# Patient Record
Sex: MALE | Race: WHITE | NOT HISPANIC OR LATINO | Employment: OTHER | URBAN - METROPOLITAN AREA
[De-identification: names, ages, dates, MRNs, and addresses within clinical notes are randomized per-mention and may not be internally consistent; named-entity substitution may affect disease eponyms.]

---

## 2017-03-14 ENCOUNTER — OFFICE VISIT (OUTPATIENT)
Dept: OPTOMETRY | Facility: CLINIC | Age: 77
End: 2017-03-14
Payer: COMMERCIAL

## 2017-03-14 DIAGNOSIS — E11.9 TYPE 2 DIABETES MELLITUS WITHOUT COMPLICATION, WITHOUT LONG-TERM CURRENT USE OF INSULIN (H): Primary | ICD-10-CM

## 2017-03-14 DIAGNOSIS — H52.13 MYOPIA WITH ASTIGMATISM AND PRESBYOPIA, BILATERAL: ICD-10-CM

## 2017-03-14 DIAGNOSIS — H52.203 MYOPIA WITH ASTIGMATISM AND PRESBYOPIA, BILATERAL: ICD-10-CM

## 2017-03-14 DIAGNOSIS — H52.4 MYOPIA WITH ASTIGMATISM AND PRESBYOPIA, BILATERAL: ICD-10-CM

## 2017-03-14 PROCEDURE — 92004 COMPRE OPH EXAM NEW PT 1/>: CPT | Performed by: OPTOMETRIST

## 2017-03-14 PROCEDURE — 92015 DETERMINE REFRACTIVE STATE: CPT | Performed by: OPTOMETRIST

## 2017-03-14 ASSESSMENT — VISUAL ACUITY
CORRECTION_TYPE: GLASSES
OD_CC: 20/25
OD_SC: 20/60
OS_CC+: -2
OS_CC: 20/60
OS_CC: 20/30
OD_CC: 20/125
METHOD: SNELLEN - LINEAR
OD_SC+: -1
OS_SC: 20/40
OS_SC+: +2

## 2017-03-14 ASSESSMENT — EXTERNAL EXAM - LEFT EYE: OS_EXAM: NORMAL

## 2017-03-14 ASSESSMENT — SLIT LAMP EXAM - LIDS: COMMENTS: DERMATOCHALASIS

## 2017-03-14 ASSESSMENT — CUP TO DISC RATIO
OS_RATIO: 0.45/0.4
OD_RATIO: 0.5

## 2017-03-14 ASSESSMENT — REFRACTION_WEARINGRX
OS_CYLINDER: +2.00
OD_SPHERE: -3.00
OD_AXIS: 098
OS_AXIS: 106
OS_ADD: +2.50
OD_ADD: +2.50
OD_CYLINDER: +1.50
OS_SPHERE: -3.50
SPECS_TYPE: PAL

## 2017-03-14 ASSESSMENT — EXTERNAL EXAM - RIGHT EYE: OD_EXAM: NORMAL

## 2017-03-14 ASSESSMENT — REFRACTION_MANIFEST
OD_SPHERE: -3.00
OS_CYLINDER: +2.00
OD_AXIS: 098
OS_AXIS: 107
OD_CYLINDER: +1.75
OS_SPHERE: -3.25

## 2017-03-14 ASSESSMENT — TONOMETRY
IOP_METHOD: APPLANATION
OD_IOP_MMHG: 15
OS_IOP_MMHG: 15

## 2017-03-14 ASSESSMENT — CONF VISUAL FIELD
OD_NORMAL: 1
OS_NORMAL: 1

## 2017-03-14 NOTE — MR AVS SNAPSHOT
After Visit Summary   3/14/2017    Austin Garza    MRN: [id number]           Patient Information     Date Of Birth          1940        Visit Information        Provider Department      3/14/2017 9:00 AM [facility]        Today's Diagnoses     Type 2 diabetes mellitus without complication, without long-term current use of insulin (H)    -  1    Myopia with astigmatism and presbyopia, bilateral          Care Instructions    Blood glucose should be below 120 in order to prevent ocular complications of diabetes. Each 1% decrease in your A1c significantly reduces your progression of diabetic retinopathy. Controlling diseases such as cholesterol and  blood pressure will further decrease your chance of diabetic eye disease loss.          Follow-ups after your visit        Follow-up notes from your care team     Return in about 1 year (around 3/14/2018).      Who to contact     If you have questions or need follow up information about today's clinic visit or your schedule please contact [facility] (formerly [facility]] directly at [phone].  Normal or non-critical lab and imaging results will be communicated to you by [employer], letter or phone within 4 business days after the clinic has received the results. If you do not hear from us within 7 days, please contact the clinic through [employer] or phone. If you have a critical or abnormal lab result, we will notify you by phone as soon as possible.  Submit refill requests through [employer] or call your pharmacy and they will forward the refill request to us. Please allow 3 business days for your refill to be completed.          Additional Information About Your Visit        MyChart Information     [employer] gives you secure access to your electronic health record. If you see a primary care provider, you can also send messages to your care team and make appointments. If you have questions, please call your primary care  clinic.  If you do not have a primary care provider, please call [phone] and they will assist you.        Care EveryWhere ID     This is your Care EveryWhere ID. This could be used by other organizations to access your [place] medical records  [id number]         Blood Pressure from Last 3 Encounters:   11/22/16 130/78   08/16/16 118/76   10/23/15 130/70    Weight from Last 3 Encounters:   11/22/16 98.9 kg (218 lb 1 oz)   08/16/16 97.6 kg (215 lb 1.6 oz)   10/23/15 99.2 kg (218 lb 12.8 oz)              We Performed the Following     EYE EXAM (SIMPLE-NONBILLABLE)     REFRACTION        Primary Care Provider Office Phone # Fax #    Sandi Eastman [phone]       [facility] [address] [place] [address]        Thank you!     Thank you for choosing [facility]  for your care. Our goal is always to provide you with excellent care. Hearing back from our patients is one way we can continue to improve our services. Please take a few minutes to complete the written survey that you may receive in the mail after your visit with us. Thank you!             Your Updated Medication List - Protect others around you: Learn how to safely use, store and throw away your medicines at www.disposemymeds.org.          This list is accurate as of: 3/14/17 10:08 AM.  Always use your most recent med list.                   Brand Name Dispense Instructions for use    aspirin 81 MG tablet     100    ONE DAILY       blood glucose monitoring meter device kit     1 kit    Use to test blood sugars 2-3 times daily as directed. Brand per insurance formulary       blood glucose monitoring test strip    ONE TOUCH ULTRA    1 Box    Test 2-3 times daily as directed, brand per insurance formulary.       glimepiride 1 MG tablet    AMARYL    90 tablet    Take 1 tablet (1 mg) by mouth every morning (before breakfast)       losartan-hydrochlorothiazide 50-12.5 MG per tablet    HYZAAR    90 tablet     Take 1 tablet by mouth daily       metFORMIN 1000 MG tablet    GLUCOPHAGE    180 tablet    Take 1 tablet (1,000 mg) by mouth 2 times daily (with meals)       Multi-vitamin Tabs tablet   Generic drug:  multivitamin, therapeutic with minerals      1 po qd       simvastatin 40 MG tablet    ZOCOR    90 tablet    Take 1 tablet (40 mg) by mouth At Bedtime

## 2017-03-14 NOTE — PATIENT INSTRUCTIONS
Blood glucose should be below 120 in order to prevent ocular complications of diabetes. Each 1% decrease in your A1c significantly reduces your progression of diabetic retinopathy. Controlling diseases such as cholesterol and  blood pressure will further decrease your chance of diabetic eye disease loss.

## 2017-04-11 ENCOUNTER — OFFICE VISIT (OUTPATIENT)
Dept: URGENT CARE | Facility: URGENT CARE | Age: 77
End: 2017-04-11
Payer: COMMERCIAL

## 2017-04-11 ENCOUNTER — DOCUMENTATION ONLY (OUTPATIENT)
Dept: LAB | Facility: CLINIC | Age: 77
End: 2017-04-11

## 2017-04-11 VITALS
OXYGEN SATURATION: 96 % | BODY MASS INDEX: 32.61 KG/M2 | HEIGHT: 69 IN | SYSTOLIC BLOOD PRESSURE: 140 MMHG | TEMPERATURE: 98.2 F | DIASTOLIC BLOOD PRESSURE: 80 MMHG | HEART RATE: 92 BPM | WEIGHT: 220.2 LBS

## 2017-04-11 DIAGNOSIS — J98.01 ACUTE BRONCHOSPASM: Primary | ICD-10-CM

## 2017-04-11 DIAGNOSIS — H69.91 DYSFUNCTION OF EUSTACHIAN TUBE, RIGHT: ICD-10-CM

## 2017-04-11 DIAGNOSIS — E11.9 TYPE 2 DIABETES MELLITUS WITHOUT COMPLICATION, WITHOUT LONG-TERM CURRENT USE OF INSULIN (H): Primary | ICD-10-CM

## 2017-04-11 DIAGNOSIS — E11.9 TYPE 2 DIABETES MELLITUS WITHOUT COMPLICATION, WITHOUT LONG-TERM CURRENT USE OF INSULIN (H): ICD-10-CM

## 2017-04-11 LAB — HBA1C MFR BLD: 6.7 % (ref 4.3–6)

## 2017-04-11 PROCEDURE — 99214 OFFICE O/P EST MOD 30 MIN: CPT | Mod: 25 | Performed by: PHYSICIAN ASSISTANT

## 2017-04-11 PROCEDURE — 83036 HEMOGLOBIN GLYCOSYLATED A1C: CPT | Performed by: INTERNAL MEDICINE

## 2017-04-11 PROCEDURE — 36415 COLL VENOUS BLD VENIPUNCTURE: CPT | Performed by: INTERNAL MEDICINE

## 2017-04-11 PROCEDURE — 94640 AIRWAY INHALATION TREATMENT: CPT | Performed by: PHYSICIAN ASSISTANT

## 2017-04-11 RX ORDER — ALBUTEROL SULFATE 90 UG/1
2 AEROSOL, METERED RESPIRATORY (INHALATION) EVERY 6 HOURS PRN
Qty: 1 INHALER | Refills: 0 | Status: ON HOLD | OUTPATIENT
Start: 2017-04-11 | End: 2017-12-04

## 2017-04-11 RX ORDER — FLUTICASONE PROPIONATE 50 MCG
1-2 SPRAY, SUSPENSION (ML) NASAL DAILY
Qty: 3 BOTTLE | Refills: 11 | Status: SHIPPED | OUTPATIENT
Start: 2017-04-11 | End: 2017-11-29

## 2017-04-11 RX ORDER — ALBUTEROL SULFATE 0.83 MG/ML
1 SOLUTION RESPIRATORY (INHALATION) ONCE
Qty: 3 ML | Refills: 0
Start: 2017-04-11 | End: 2017-04-11

## 2017-04-11 NOTE — NURSING NOTE
The following nebulizer treatment was given:     MEDICATION: Albuterol Sulfate 2.5 mg  : [employer]  LOT #: [id number]  EXPIRATION DATE:  Oct/2018  NDC # 9987-3018-18  //Karen Hart MA// April 11, 2017 10:24 AM

## 2017-04-11 NOTE — NURSING NOTE
"Chief Complaint   Patient presents with     Cough     x 10days  productive, URI x 7 days,  sinus drainage,  worst at night, plugged ears, hard to sleep, tired HBP, cough drops        Initial /80  Pulse 92  Temp 98.2  F (36.8  C) (Oral)  Ht 5' 8.5\" (1.74 m)  Wt 220 lb 3.2 oz (99.9 kg)  SpO2 96%  BMI 32.99 kg/m2 Estimated body mass index is 32.99 kg/(m^2) as calculated from the following:    Height as of this encounter: 5' 8.5\" (1.74 m).    Weight as of this encounter: 220 lb 3.2 oz (99.9 kg).  Medication Reconciliation: complete   Karen Hart MA// April 11, 2017 9:07 AM          "

## 2017-04-11 NOTE — PROGRESS NOTES
SUBJECTIVE:   Austin Garza is a 76 year old male presenting with a chief complaint of fever, nasal congestion, rhinorrhea, cough  and hoarse voice.  Onset of symptoms was 10 day(s) ago.  Course of illness is worsening.    Severity moderate  Current and Associated symptoms: see above  Treatment measures tried include coridin HBP.  Predisposing factors include seasonal allergies.    Has not taken BP meds this AM, will upon return home.     Patient is also concerned about chronic pressure and fullness in  ears    Past Medical History:   Diagnosis Date     Diaphragmatic hernia without mention of obstruction or gangrene      Diverticulitis of colon (without mention of hemorrhage)      Esophageal reflux      Irritable bowel syndrome      Macular degeneration (senile) of retina, unspecified      Squamous Cell Carcinoma, Back 1988     Type II or unspecified type diabetes mellitus without mention of complication, not stated as uncontrolled      Unspecified essential hypertension      Current Outpatient Prescriptions   Medication Sig Dispense Refill     metFORMIN (GLUCOPHAGE) 1000 MG tablet Take 1 tablet (1,000 mg) by mouth 2 times daily (with meals) 180 tablet 3     simvastatin (ZOCOR) 40 MG tablet Take 1 tablet (40 mg) by mouth At Bedtime 90 tablet 3     glimepiride (AMARYL) 1 MG tablet Take 1 tablet (1 mg) by mouth every morning (before breakfast) 90 tablet 3     losartan-hydrochlorothiazide (HYZAAR) 50-12.5 MG per tablet Take 1 tablet by mouth daily 90 tablet 3     blood glucose (ONE TOUCH ULTRA) test strip Test 2-3 times daily as directed, brand per insurance formulary. 1 Box 12     Blood Glucose Monitoring Suppl (ONE TOUCH ULTRA 2) W/DEVICE KIT Use to test blood sugars 2-3 times daily as directed. Brand per insurance formulary   1 kit 0     ASPIRIN 81 MG OR TABS ONE DAILY 100 3     MULTI-VITAMIN OR TABS 1 po qd       Social History   Substance Use Topics     Smoking status: Former Smoker     Packs/day: 0.50      "Years: 20.00     Quit date: 1/1/1975     Smokeless tobacco: Former User     Alcohol use 6.0 - 8.4 oz/week     10 - 14 Standard drinks or equivalent per week       ROS:  Review of systems negative except as stated above.    OBJECTIVE  :/80  Pulse 92  Temp 98.2  F (36.8  C) (Oral)  Ht 5' 8.5\" (1.74 m)  Wt 220 lb 3.2 oz (99.9 kg)  SpO2 96%  BMI 32.99 kg/m2  GENERAL APPEARANCE: healthy, alert and no distress  EYES: EOMI,  PERRL, conjunctiva clear  HENT: R ear canal normal TM swollen cloudy.  L ear canla obstructed with dry cerumen defers nurse lavage   Nose and mouth without ulcers, erythema or lesions  NECK: supple, nontender, no lymphadenopathy  RESP:expiratory wheezes throughout lung fields, cleared following in clinic Neb treatment.  no rales, rhonchi or wheezes  CV: regular rates and rhythm, normal S1 S2, no murmur noted    ASSESSMENT:  (J98.01) Acute bronchospasm  (primary encounter diagnosis)  Comment: prednisone discussed, but deferred by patient until follow up with PCP.  Pt to follow up if symptoms worsen in the meantime  Plan: INHALATION/NEBULIZER TREATMENT, INITIAL,         albuterol (2.5 MG/3ML) 0.083% neb solution,         albuterol (PROAIR HFA/PROVENTIL HFA/VENTOLIN         HFA) 108 (90 BASE) MCG/ACT Inhaler            (H69.81) Dysfunction of eustachian tube, right  Plan: fluticasone (FLONASE) 50 MCG/ACT spray  Marilu pot, fluids, humidity      "

## 2017-04-11 NOTE — MR AVS SNAPSHOT
After Visit Summary   4/11/2017    Austin Garza    MRN: [id number]           Patient Information     Date Of Birth          1940        Visit Information        Provider Department      4/11/2017 9:00 AM Antoine Finn PA-C [facility]        Today's Diagnoses     Acute bronchospasm    -  1    Dysfunction of eustachian tube, right          Care Instructions    Flonase  Marilu Pot  Albuterol inhaler        Bronchospasm (Adult)    Bronchospasm occurs when the airways (bronchial tubes) go into spasm and contract. This makes it hard to breathe and causes wheezing (a high-pitched whistling sound). Bronchospasm can also cause frequent coughing without wheezing.  Bronchospasm is due to irritation, inflammation, or allergic reaction of the airways. People with asthma get bronchospasm. However, not everyone with bronchospasm has asthma.  Being exposed to harmful fumes, a recent case of bronchitis, exercise, or a flare-up of chronic obstructive pulmonary disease (COPD) may cause the airways to spasm. An episode of bronchospasm may last 7 to 14 days. Medicine may be prescribed to relax the airways and prevent wheezing. Antibiotics will be prescribed only if your healthcare provider thinks there is a bacterial infection. Antibiotics do not help a viral infection.  Home care     Drink lots of water or other fluids (at least 10 glasses a day) during an attack. This will loosen lung secretions and make it easier to breathe. If you have heart or kidney disease, check with your doctor before you drink extra fluids.    Take prescribed medicine exactly at the times advised. If you take an inhaled medicine to help with breathing, do not use it more than once every 4 hours, unless told to do so. If prescribed an antibiotic or prednisone, take all of the medicine, even if you are feeling better after a few days.    Do not smoke. Also avoid being exposed to secondhand smoke.    If you were  given an inhaler, use it exactly as directed. If you need to use it more often than prescribed, your condition may be getting worse. Contact your healthcare provider.  Follow-up care  Follow up with your healthcare provider, or as advised.   (Note: If you are age 65 or older, have a chronic lung disease or condition that affects your immune system, or you smoke, we recommend getting pneumococcal vaccinations, as well as an influenza vaccination (flu shot) every autumn. Ask your healthcare provider about this.)  When to seek medical advice  Call your healthcare provider right away if any of these occur:    You need to use your inhalers more often than usual.    You develop a fever of 100.4 F (38 C) or higher.    You are coughing up lots of dark-colored sputum (mucus).    You do not start to improve within 24 hours.  Call 911, or get immediate medical care  Contact emergency services if any of these occur:    Coughing up bloody sputum (mucus)    Chest pain with each breath    Increased wheezing or shortness of breath    5804-3152 [employer]. [address] [place] [address]. All rights reserved. This information is not intended as a substitute for professional medical care. Always follow your healthcare professional's instructions.        Self-Care for Sinusitis  Sinusitis can often be managed with self-care. Self-care can keep sinuses moist and make you feel more comfortable. Remember to follow your doctor's instructions closely, which can make a big difference in getting your sinus problem under control.    Drink fluids  Drinking extra fluids -- a glass every hour or two -- helps thin your mucus, allowing it to drain from your sinuses more easily. A humidifier helps in much the same way. Fluids can also offset the drying effects of certain drugs.  Use saltwater rinses  Rinses help keep your sinuses and nose moist. Mix a teaspoon of salt in 8 ounces of fresh, warm water. Use a bulb syringe to  gently squirt the water into your nose a few times a day. You can also buy ready-made saline nasal sprays.  Apply hot or cold packs  Applying heat to the area surrounding your sinuses may make you feel more comfortable. Use a hot water bottle or a hand towel dipped in hot water. Some people also find ice packs effective for relieving pain.  Medications  Your doctor may prescribe medications to help treat your sinusitis. If you have an infection, antibiotics can help clear it up. If you are prescribed antibiotics, take all pills on schedule until they are gone, even if you feel better. Decongestants help relieve swelling. Use decongestant sprays for short periods only under the direction of your doctor. If you have allergies, your doctor may prescribe medications to help relieve them.     0581-4435 [employer]. [address] [place] [address]. All rights reserved. This information is not intended as a substitute for professional medical care. Always follow your healthcare professional's instructions.              Follow-ups after your visit        Your next 10 appointments already scheduled     Apr 13, 2017 10:50 AM CDT   Office Visit with Sandi Eastman MD   [facility])    [address] [place] [address]  [address]  [facility] [address] [phone]           Bring a current list of meds and any records pertaining to this visit.  For Physicals, please bring immunization records and any forms needing to be filled out.  Please arrive 10 minutes early to complete paperwork.              Who to contact     If you have questions or need follow up information about today's clinic visit or your schedule please contact [facility] directly at [phone].  Normal or non-critical lab and imaging results will be communicated to you by MyChart, letter or phone within 4 business days after the clinic has received the results. If you do not hear from  "us within 7 days, please contact the clinic through [employer] or phone. If you have a critical or abnormal lab result, we will notify you by phone as soon as possible.  Submit refill requests through [employer] or call your pharmacy and they will forward the refill request to us. Please allow 3 business days for your refill to be completed.          Additional Information About Your Visit        [employer] Information     [employer] gives you secure access to your electronic health record. If you see a primary care provider, you can also send messages to your care team and make appointments. If you have questions, please call your primary care clinic.  If you do not have a primary care provider, please call [phone] and they will assist you.        Care EveryWhere ID     This is your Care EveryWhere ID. This could be used by other organizations to access your [place] medical records  [id number]        Your Vitals Were     Pulse Temperature Height Pulse Oximetry BMI (Body Mass Index)       92 98.2  F (36.8  C) (Oral) 5' 8.5\" (1.74 m) 96% 32.99 kg/m2        Blood Pressure from Last 3 Encounters:   04/11/17 140/80   11/22/16 130/78   08/16/16 118/76    Weight from Last 3 Encounters:   04/11/17 220 lb 3.2 oz (99.9 kg)   11/22/16 218 lb 1 oz (98.9 kg)   08/16/16 215 lb 1.6 oz (97.6 kg)              We Performed the Following     INHALATION/NEBULIZER TREATMENT, INITIAL          Today's Medication Changes          These changes are accurate as of: 4/11/17  9:54 AM.  If you have any questions, ask your nurse or doctor.               Start taking these medicines.        Dose/Directions    * albuterol (2.5 MG/3ML) 0.083% neb solution   Used for:  Acute bronchospasm   Started by:  Antoine Finn PA-C        Dose:  1 vial   Take 1 vial (2.5 mg) by nebulization once for 1 dose   Quantity:  3 mL   Refills:  0       * albuterol 108 (90 BASE) MCG/ACT Inhaler   Commonly known as:  PROAIR HFA/PROVENTIL HFA/VENTOLIN HFA   Used " for:  Acute bronchospasm   Started by:  Antoine Finn PA-C        Dose:  2 puff   Inhale 2 puffs into the lungs every 6 hours as needed for shortness of breath / dyspnea or wheezing   Quantity:  1 Inhaler   Refills:  0       fluticasone 50 MCG/ACT spray   Commonly known as:  FLONASE   Used for:  Dysfunction of eustachian tube, right   Started by:  Antoine Finn PA-C        Dose:  1-2 spray   Spray 1-2 sprays into both nostrils daily   Quantity:  3 Bottle   Refills:  11       * Notice:  This list has 2 medication(s) that are the same as other medications prescribed for you. Read the directions carefully, and ask your doctor or other care provider to review them with you.         Where to get your medicines      These medications were sent to [facility]/pharmacy #[id number] - [place], [place] - [address] AT [address] [place], [place] [address]     Phone:  [phone]     albuterol 108 (90 BASE) MCG/ACT Inhaler    fluticasone 50 MCG/ACT spray         Some of these will need a paper prescription and others can be bought over the counter.  Ask your nurse if you have questions.     You don't need a prescription for these medications     albuterol (2.5 MG/3ML) 0.083% neb solution                Primary Care Provider Office Phone # Fax #    Sandi Eastman [phone]       [address] [place] [address]        Thank you!     Thank you for choosing [facility]  for your care. Our goal is always to provide you with excellent care. Hearing back from our patients is one way we can continue to improve our services. Please take a few minutes to complete the written survey that you may receive in the mail after your visit with us. Thank you!             Your Updated Medication List - Protect others around you: Learn how to safely use, store and throw away your medicines at www.disposemymeds.org.          This  list is accurate as of: 4/11/17  9:54 AM.  Always use your most recent med list.                   Brand Name Dispense Instructions for use    * albuterol (2.5 MG/3ML) 0.083% neb solution     3 mL    Take 1 vial (2.5 mg) by nebulization once for 1 dose       * albuterol 108 (90 BASE) MCG/ACT Inhaler    PROAIR HFA/PROVENTIL HFA/VENTOLIN HFA    1 Inhaler    Inhale 2 puffs into the lungs every 6 hours as needed for shortness of breath / dyspnea or wheezing       aspirin 81 MG tablet     100    ONE DAILY       blood glucose monitoring meter device kit     1 kit    Use to test blood sugars 2-3 times daily as directed. Brand per insurance formulary       blood glucose monitoring test strip    ONE TOUCH ULTRA    1 Box    Test 2-3 times daily as directed, brand per insurance formulary.       fluticasone 50 MCG/ACT spray    FLONASE    3 Bottle    Spray 1-2 sprays into both nostrils daily       glimepiride 1 MG tablet    AMARYL    90 tablet    Take 1 tablet (1 mg) by mouth every morning (before breakfast)       losartan-hydrochlorothiazide 50-12.5 MG per tablet    HYZAAR    90 tablet    Take 1 tablet by mouth daily       metFORMIN 1000 MG tablet    GLUCOPHAGE    180 tablet    Take 1 tablet (1,000 mg) by mouth 2 times daily (with meals)       Multi-vitamin Tabs tablet   Generic drug:  multivitamin, therapeutic with minerals      1 po qd       simvastatin 40 MG tablet    ZOCOR    90 tablet    Take 1 tablet (40 mg) by mouth At Bedtime       * Notice:  This list has 2 medication(s) that are the same as other medications prescribed for you. Read the directions carefully, and ask your doctor or other care provider to review them with you.

## 2017-04-11 NOTE — PATIENT INSTRUCTIONS
Flonase  Marilu Pot  Albuterol inhaler        Bronchospasm (Adult)    Bronchospasm occurs when the airways (bronchial tubes) go into spasm and contract. This makes it hard to breathe and causes wheezing (a high-pitched whistling sound). Bronchospasm can also cause frequent coughing without wheezing.  Bronchospasm is due to irritation, inflammation, or allergic reaction of the airways. People with asthma get bronchospasm. However, not everyone with bronchospasm has asthma.  Being exposed to harmful fumes, a recent case of bronchitis, exercise, or a flare-up of chronic obstructive pulmonary disease (COPD) may cause the airways to spasm. An episode of bronchospasm may last 7 to 14 days. Medicine may be prescribed to relax the airways and prevent wheezing. Antibiotics will be prescribed only if your healthcare provider thinks there is a bacterial infection. Antibiotics do not help a viral infection.  Home care     Drink lots of water or other fluids (at least 10 glasses a day) during an attack. This will loosen lung secretions and make it easier to breathe. If you have heart or kidney disease, check with your doctor before you drink extra fluids.    Take prescribed medicine exactly at the times advised. If you take an inhaled medicine to help with breathing, do not use it more than once every 4 hours, unless told to do so. If prescribed an antibiotic or prednisone, take all of the medicine, even if you are feeling better after a few days.    Do not smoke. Also avoid being exposed to secondhand smoke.    If you were given an inhaler, use it exactly as directed. If you need to use it more often than prescribed, your condition may be getting worse. Contact your healthcare provider.  Follow-up care  Follow up with your healthcare provider, or as advised.   (Note: If you are age 65 or older, have a chronic lung disease or condition that affects your immune system, or you smoke, we recommend getting pneumococcal  vaccinations, as well as an influenza vaccination (flu shot) every autumn. Ask your healthcare provider about this.)  When to seek medical advice  Call your healthcare provider right away if any of these occur:    You need to use your inhalers more often than usual.    You develop a fever of 100.4 F (38 C) or higher.    You are coughing up lots of dark-colored sputum (mucus).    You do not start to improve within 24 hours.  Call 911, or get immediate medical care  Contact emergency services if any of these occur:    Coughing up bloody sputum (mucus)    Chest pain with each breath    Increased wheezing or shortness of breath    6526-8914 [employer]. [address] [place] [address] 10833. All rights reserved. This information is not intended as a substitute for professional medical care. Always follow your healthcare professional's instructions.        Self-Care for Sinusitis  Sinusitis can often be managed with self-care. Self-care can keep sinuses moist and make you feel more comfortable. Remember to follow your doctor's instructions closely, which can make a big difference in getting your sinus problem under control.    Drink fluids  Drinking extra fluids -- a glass every hour or two -- helps thin your mucus, allowing it to drain from your sinuses more easily. A humidifier helps in much the same way. Fluids can also offset the drying effects of certain drugs.  Use saltwater rinses  Rinses help keep your sinuses and nose moist. Mix a teaspoon of salt in 8 ounces of fresh, warm water. Use a bulb syringe to gently squirt the water into your nose a few times a day. You can also buy ready-made saline nasal sprays.  Apply hot or cold packs  Applying heat to the area surrounding your sinuses may make you feel more comfortable. Use a hot water bottle or a hand towel dipped in hot water. Some people also find ice packs effective for relieving pain.  Medications  Your doctor may prescribe medications to  help treat your sinusitis. If you have an infection, antibiotics can help clear it up. If you are prescribed antibiotics, take all pills on schedule until they are gone, even if you feel better. Decongestants help relieve swelling. Use decongestant sprays for short periods only under the direction of your doctor. If you have allergies, your doctor may prescribe medications to help relieve them.     4108-2407 [employer]. [address] [place] [address], [place] [address]. All rights reserved. This information is not intended as a substitute for professional medical care. Always follow your healthcare professional's instructions.

## 2017-04-13 ENCOUNTER — OFFICE VISIT (OUTPATIENT)
Dept: PEDIATRICS | Facility: CLINIC | Age: 77
End: 2017-04-13
Payer: COMMERCIAL

## 2017-04-13 VITALS
SYSTOLIC BLOOD PRESSURE: 136 MMHG | HEIGHT: 69 IN | HEART RATE: 103 BPM | OXYGEN SATURATION: 96 % | BODY MASS INDEX: 32.35 KG/M2 | DIASTOLIC BLOOD PRESSURE: 74 MMHG | TEMPERATURE: 99 F | WEIGHT: 218.44 LBS

## 2017-04-13 DIAGNOSIS — I10 HYPERTENSION GOAL BP (BLOOD PRESSURE) < 140/90: ICD-10-CM

## 2017-04-13 DIAGNOSIS — J20.9 ACUTE BRONCHITIS WITH SYMPTOMS > 10 DAYS: Primary | ICD-10-CM

## 2017-04-13 DIAGNOSIS — E11.9 TYPE 2 DIABETES MELLITUS WITHOUT COMPLICATION, WITHOUT LONG-TERM CURRENT USE OF INSULIN (H): ICD-10-CM

## 2017-04-13 PROCEDURE — 99214 OFFICE O/P EST MOD 30 MIN: CPT | Performed by: INTERNAL MEDICINE

## 2017-04-13 RX ORDER — AZITHROMYCIN 250 MG/1
TABLET, FILM COATED ORAL
Qty: 6 TABLET | Refills: 0 | Status: SHIPPED | OUTPATIENT
Start: 2017-04-13 | End: 2017-11-16

## 2017-04-13 NOTE — PROGRESS NOTES
"  SUBJECTIVE:                                                    Austin Garza is a 76 year old male who presents to clinic today for the following health issues:        Diabetes Follow-up      Patient is checking blood sugars: not at all    Diabetic concerns: None     Symptoms of hypoglycemia (low blood sugar): none     Paresthesias (numbness or burning in feet) or sores: No     Date of last diabetic eye exam: 3/14/17     Hyperlipidemia Follow-Up      Rate your low fat/cholesterol diet?: good    Taking statin?  Yes, no muscle aches from statin    Other lipid medications/supplements?:  none     Hypertension Follow-up      Outpatient blood pressures are not being checked.    Low Salt Diet: low salt         Amount of exercise or physical activity: 2-3 days/week for an average of greater than 60 minutes    Problems taking medications regularly: No    Medication side effects: none    Diet: regular (no restrictions), low salt and low fat/cholesterol    1. Diabetes follow-up: no issues with medications, he is trying to eat a balanced, diabetic diet. No problems with low blood sugars.    2. Hypertension: No chest pain, but does have shortness of breath from recent illness. No headaches, vision changes.    3. Cough: reports that he has had \"cold symptoms\" for the past 2 weeks or so, but that it is mainly a cough that feels like he can't get any mucous up. Describes this as a tickle in his chest. Does feel short of breath. Deep inspiration or laying flat seems to trigger cough. Does feel somewhat sweaty at times, but no fevers that he knows of. No runny nose or sore throat. No leg swelling. No chest pain, but his \"abdominal muscles are sore from so much coughing\". Was recently seen in Urgent Care for his symptoms and given Flonase and albuterol, which don't seem to help much. Albuterol seems to make the coughing worse.     Problem list and histories reviewed & adjusted, as indicated.  Additional history: as " "documented    Patient Active Problem List   Diagnosis     Hearing loss     Dysfunction of eustachian tube     Hypertension goal BP (blood pressure) < 140/90     Diminished Peripheral Pulse     Type 2 diabetes mellitus without complications, goal <7%     HYPERLIPIDEMIA LDL GOAL <100     Inflammatory Monoarthritis, Left Hip     Advanced directives, counseling/discussion     Esophageal reflux     Overweight - BMI >35     BCC (basal cell carcinoma), face     Skin lesion of back     Past Surgical History:   Procedure Laterality Date     APPENDECTOMY  1966     COLONOSCOPY       SURGICAL HISTORY OF -   1985-90    Squamous cell skin cancer resection - back     SURGICAL HISTORY OF - 1998    skin tags resection     SURGICAL HISTORY OF -   2/2001    stress thall. 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 Mother      CANCER Mother      skin     Eye Disorder Mother      glaucoma     Prostate Cancer No family hx of      Cancer - colorectal No family hx of            Reviewed and updated as needed this visit by clinical staff  Tobacco  Allergies  Meds  Med Hx  Fam Hx  Soc Hx        ROS:  Constitutional, HEENT, cardiovascular, pulmonary, gi and gu systems are negative, except as otherwise noted.    OBJECTIVE:                                                    /74 (BP Location: Right arm, Patient Position: Chair, Cuff Size: Adult Large)  Pulse 103  Temp 99  F (37.2  C) (Oral)  Ht 5' 8.5\" (1.74 m)  Wt 218 lb 7 oz (99.1 kg) "  SpO2 96%  BMI 32.73 kg/m2  Body mass index is 32.73 kg/(m^2).  GENERAL: healthy, alert and no distress  EYES: Eyes grossly normal to inspection, PERRL and conjunctivae and sclerae normal  HENT: normal cephalic/atraumatic, right ear: normal: no effusions, no erythema, normal landmarks, left ear: occluded with wax, nose and mouth without ulcers or lesions, oropharynx clear and oral mucous membranes moist  NECK: no adenopathy, no asymmetry, masses, or scars and thyroid normal to palpation  RESP: coarse breath sounds throughout, good air entry, no appreciable wheezes  CV: tachycardic but regular, no appreciable murmurs.     Diagnostic Test Results:  none      ASSESSMENT/PLAN:                                                      1. Acute bronchitis with symptoms > 10 days  Given duration of symptoms and overall lack of improvement, reasonable to treat with antibiotics at this time. Does have some orthopnea, but no other symptoms or clinical signs of CHF, PE unlikely as no clear trigger and would not expect to have coarse lungs sounds throughout with this. Will start with antibiotics, if not improving by early next week or symptoms worsen, will have patient return to clinic for CXR and recheck.  - azithromycin (ZITHROMAX) 250 MG tablet; Two tablets first day, then one tablet daily for four days.  Dispense: 6 tablet; Refill: 0    2. Hypertension goal BP (blood pressure) < 140/90  Well controlled on current regimen. Does not need refills, not due for labs.    3. Type 2 diabetes mellitus without complication, without long-term current use of insulin (H)  HgbA1c rising but still within goal range. Reviewed that DM is progressive disease and may need to increase medications in the future but will monitor for now. Follow-up in 6 months.       Patient Instructions   Your diabetes looks pretty good -- hemoglobin A1c is 6.7; we want this less than 7.0.    You do have bronchitis -- given how long this has been going on, I worry  that you may have developed a bit of a bacterial infection. Let's have you start an antibiotic: Azithromycin 2 tabs today, then 1 tab daily for the next 4 days.    If your cough is not improving, come back for a chest X-ray.    Let us know when you are due for medication refills.    I'll see you in six months.       Sadni Nguyen MD  [facility]

## 2017-04-13 NOTE — MR AVS SNAPSHOT
After Visit Summary   4/13/2017    Austin Garza    MRN: [id number]           Patient Information     Date Of Birth          1940        Visit Information        Provider Department      4/13/2017 10:50 AM [facility]        Today's Diagnoses     Acute bronchitis with symptoms > 10 days    -  1      Care Instructions    Your diabetes looks pretty good -- hemoglobin A1c is 6.7; we want this less than 7.0.    You do have bronchitis -- given how long this has been going on, I worry that you may have developed a bit of a bacterial infection. Let's have you start an antibiotic: Azithromycin 2 tabs today, then 1 tab daily for the next 4 days.    If your cough is not improving, come back for a chest X-ray.    Let us know when you are due for medication refills.    I'll see you in six months.         Follow-ups after your visit        Who to contact     If you have questions or need follow up information about today's clinic visit or your schedule please contact [facility] directly at [phone].  Normal or non-critical lab and imaging results will be communicated to you by [employer], letter or phone within 4 business days after the clinic has received the results. If you do not hear from us within 7 days, please contact the clinic through [employer] or phone. If you have a critical or abnormal lab result, we will notify you by phone as soon as possible.  Submit refill requests through [employer] or call your pharmacy and they will forward the refill request to us. Please allow 3 business days for your refill to be completed.          Additional Information About Your Visit        [employer] Information     [employer] gives you secure access to your electronic health record. If you see a primary care provider, you can also send messages to your care team and make appointments. If you have questions, please call your primary care clinic.  If you do not have a primary care  "provider, please call [phone] and they will assist you.        Care EveryWhere ID     This is your Care EveryWhere ID. This could be used by other organizations to access your [facility] medical records  [id number]        Your Vitals Were     Pulse Temperature Height Pulse Oximetry BMI (Body Mass Index)       103 99  F (37.2  C) (Oral) 5' 8.5\" (1.74 m) 96% 32.73 kg/m2        Blood Pressure from Last 3 Encounters:   04/13/17 136/74   04/11/17 140/80   11/22/16 130/78    Weight from Last 3 Encounters:   04/13/17 218 lb 7 oz (99.1 kg)   04/11/17 220 lb 3.2 oz (99.9 kg)   11/22/16 218 lb 1 oz (98.9 kg)              Today, you had the following     No orders found for display         Today's Medication Changes          These changes are accurate as of: 4/13/17 11:19 AM.  If you have any questions, ask your nurse or doctor.               Start taking these medicines.        Dose/Directions    azithromycin 250 MG tablet   Commonly known as:  ZITHROMAX   Used for:  Acute bronchitis with symptoms > 10 days   Started by:  Sandi Eastman MD        Two tablets first day, then one tablet daily for four days.   Quantity:  6 tablet   Refills:  0            Where to get your medicines      These medications were sent to [facility]/pharmacy #[id number] - [place], [place] - [address] AT [address], [place] [address]     Phone:  [phone]     azithromycin 250 MG tablet                Primary Care Provider Office Phone # Fax #    Sandi Eastman [phone]       [facility] [place] [address]        Thank you!     Thank you for choosing [facility]  for your care. Our goal is always to provide you with excellent care. Hearing back from our patients is one way we can continue to improve our services. Please take a few minutes to complete the written survey that you may receive in the mail after your visit with us. Thank you!      "        Your Updated Medication List - Protect others around you: Learn how to safely use, store and throw away your medicines at www.disposemymeds.org.          This list is accurate as of: 4/13/17 11:19 AM.  Always use your most recent med list.                   Brand Name Dispense Instructions for use    * albuterol (2.5 MG/3ML) 0.083% neb solution     3 mL    Take 1 vial (2.5 mg) by nebulization once for 1 dose       * albuterol 108 (90 BASE) MCG/ACT Inhaler    PROAIR HFA/PROVENTIL HFA/VENTOLIN HFA    1 Inhaler    Inhale 2 puffs into the lungs every 6 hours as needed for shortness of breath / dyspnea or wheezing       aspirin 81 MG tablet     100    ONE DAILY       azithromycin 250 MG tablet    ZITHROMAX    6 tablet    Two tablets first day, then one tablet daily for four days.       blood glucose monitoring meter device kit     1 kit    Use to test blood sugars 2-3 times daily as directed. Brand per insurance formulary       blood glucose monitoring test strip    ONE TOUCH ULTRA    1 Box    Test 2-3 times daily as directed, brand per insurance formulary.       fluticasone 50 MCG/ACT spray    FLONASE    3 Bottle    Spray 1-2 sprays into both nostrils daily       glimepiride 1 MG tablet    AMARYL    90 tablet    Take 1 tablet (1 mg) by mouth every morning (before breakfast)       losartan-hydrochlorothiazide 50-12.5 MG per tablet    HYZAAR    90 tablet    Take 1 tablet by mouth daily       metFORMIN 1000 MG tablet    GLUCOPHAGE    180 tablet    Take 1 tablet (1,000 mg) by mouth 2 times daily (with meals)       Multi-vitamin Tabs tablet   Generic drug:  multivitamin, therapeutic with minerals      1 po qd       simvastatin 40 MG tablet    ZOCOR    90 tablet    Take 1 tablet (40 mg) by mouth At Bedtime       * Notice:  This list has 2 medication(s) that are the same as other medications prescribed for you. Read the directions carefully, and ask your doctor or other care provider to review them with you.

## 2017-04-13 NOTE — PATIENT INSTRUCTIONS
Your diabetes looks pretty good -- hemoglobin A1c is 6.7; we want this less than 7.0.    You do have bronchitis -- given how long this has been going on, I worry that you may have developed a bit of a bacterial infection. Let's have you start an antibiotic: Azithromycin 2 tabs today, then 1 tab daily for the next 4 days.    If your cough is not improving, come back for a chest X-ray.    Let us know when you are due for medication refills.    I'll see you in six months.

## 2017-04-13 NOTE — NURSING NOTE
"Chief Complaint   Patient presents with     Diabetes     Hyperlipidemia     Hypertension       Initial /74 (BP Location: Right arm, Patient Position: Chair, Cuff Size: Adult Large)  Pulse 103  Temp 99  F (37.2  C) (Oral)  Ht 5' 8.5\" (1.74 m)  Wt 218 lb 7 oz (99.1 kg)  SpO2 96%  BMI 32.73 kg/m2 Estimated body mass index is 32.73 kg/(m^2) as calculated from the following:    Height as of this encounter: 5' 8.5\" (1.74 m).    Weight as of this encounter: 218 lb 7 oz (99.1 kg).  Medication Reconciliation: complete     Meena Armendariz MA 4/13/2017 10:57 AM    "

## 2017-04-18 ENCOUNTER — TELEPHONE (OUTPATIENT)
Dept: PEDIATRICS | Facility: CLINIC | Age: 77
End: 2017-04-18

## 2017-04-18 NOTE — TELEPHONE ENCOUNTER
Patient calling back that his phone service is not working. Would like a call back on his [facility]. [phone]

## 2017-04-18 NOTE — TELEPHONE ENCOUNTER
"Patient seen last week for cough and cold and given Z-pack. States he was told to call back today if not getting better. States he still has the cough and it has not changed at all. It is preventing him from sleeping. States he gets \"attacks\" and then is fine for a while and then it \"attacks again\".  No fever. Not much sinus. [phone] ok to lm.  Mariaelena Hummel RN  "

## 2017-04-19 NOTE — TELEPHONE ENCOUNTER
Just a FYI to :    Pt calling back, notified as below. Pt states that his cough is considerably getting better as days go by, much better today. So, he would like to wait it out for now. But, if the cough gets better or with any new/worsening sx's, he agrees to make an appointment with any one of provider on Friday.    Mayte, RN  Triage Nurse

## 2017-04-19 NOTE — TELEPHONE ENCOUNTER
I would recommend that he come back in for a CXR and to have his lungs re-examined.     If he is not amenable to this, then we could try a steroid inhaler (I would recommend Flovent  2 puffs BID with a spacer and rinsing his mouth out after each use) and tessalon perles 200mg TID prn. If his symptoms do not improve with this by early next week then he would definitely need to come in to be seen at that time.    Sandi Eastman MD  Internal Medicine-Pediatrics

## 2017-05-12 ENCOUNTER — TRANSFERRED RECORDS (OUTPATIENT)
Dept: HEALTH INFORMATION MANAGEMENT | Facility: CLINIC | Age: 77
End: 2017-05-12

## 2017-09-26 ENCOUNTER — TRANSFERRED RECORDS (OUTPATIENT)
Dept: HEALTH INFORMATION MANAGEMENT | Facility: CLINIC | Age: 77
End: 2017-09-26

## 2017-10-04 DIAGNOSIS — E78.00 PURE HYPERCHOLESTEROLEMIA: ICD-10-CM

## 2017-10-04 NOTE — TELEPHONE ENCOUNTER
simvastatin (ZOCOR) 40 MG tablet     Last Written Prescription Date: 11/22/2016  Last Fill Quantity: 90, # refills: 3  Last Office Visit with G, P or [facility] prescribing provider: 4/13/2017       Lab Results   Component Value Date    CHOL 123 11/17/2016     Lab Results   Component Value Date    HDL 35 11/17/2016     Lab Results   Component Value Date    LDL 67 11/17/2016     Lab Results   Component Value Date    TRIG 105 11/17/2016     Lab Results   Component Value Date    CHOLHDLRATIO 2.5 10/23/2015

## 2017-10-05 RX ORDER — SIMVASTATIN 40 MG
TABLET ORAL
Qty: 90 TABLET | Refills: 0 | Status: SHIPPED | OUTPATIENT
Start: 2017-10-05 | End: 2017-11-16

## 2017-10-05 NOTE — TELEPHONE ENCOUNTER
Medication is being filled for 1 time refill only due to:  due for fasting labs, and diabetic check november.   Please call patient to help schedule this.   So Dumont, RN  Triage Nurse

## 2017-10-09 ENCOUNTER — TRANSFERRED RECORDS (OUTPATIENT)
Dept: HEALTH INFORMATION MANAGEMENT | Facility: CLINIC | Age: 77
End: 2017-10-09

## 2017-10-16 DIAGNOSIS — E11.9 TYPE 2 DIABETES MELLITUS WITHOUT COMPLICATION, WITHOUT LONG-TERM CURRENT USE OF INSULIN (H): Primary | ICD-10-CM

## 2017-10-17 NOTE — TELEPHONE ENCOUNTER
blood glucose (ONE TOUCH ULTRA) test strip      Last Written Prescription Date: 9/16/2017  Last Fill Quantity: 1 box,  # refills: 12   Last Office Visit with [facility], [facility] or [facility] prescribing provider: 4/13/2017  Pharmacy Comments: Pt. requests new Rx.                                         Next 5 appointments (look out 90 days)     Oct 26, 2017 11:30 AM CDT   Nurse Only with  [facility]    [address] [place] [address]  [address]  [facility] [id number] [phone]            Nov 16, 2017  7:30 AM CST   [facility]   [facility]    [address] [place] [address]  [address]  [facility] [id number] [phone]

## 2017-10-19 NOTE — TELEPHONE ENCOUNTER
Patient called back and he would like it sent to [facility] in [place] on [address]. Also he wanted to let us know that he has an appt set up with Jaren on 11/16.

## 2017-10-20 DIAGNOSIS — E11.9 TYPE 2 DIABETES MELLITUS WITHOUT COMPLICATION, WITHOUT LONG-TERM CURRENT USE OF INSULIN (H): ICD-10-CM

## 2017-10-20 NOTE — TELEPHONE ENCOUNTER
Pharmacy called requesting specific strip amount for the dispense of BG test strips.   Will resend as requested.     Gayle Lane RN -- [facility]

## 2017-10-26 ENCOUNTER — ALLIED HEALTH/NURSE VISIT (OUTPATIENT)
Dept: NURSING | Facility: CLINIC | Age: 77
End: 2017-10-26
Payer: COMMERCIAL

## 2017-10-26 DIAGNOSIS — Z23 NEED FOR PROPHYLACTIC VACCINATION AND INOCULATION AGAINST INFLUENZA: Primary | ICD-10-CM

## 2017-10-26 PROCEDURE — G0008 ADMIN INFLUENZA VIRUS VAC: HCPCS

## 2017-10-26 PROCEDURE — 90662 IIV NO PRSV INCREASED AG IM: CPT

## 2017-10-26 PROCEDURE — 99207 ZZC NO CHARGE NURSE ONLY: CPT

## 2017-10-26 NOTE — MR AVS SNAPSHOT
After Visit Summary   10/26/2017    Austin Garza    MRN: [id number]           Patient Information     Date Of Birth          1940        Visit Information        Provider Department      10/26/2017 11:30 AM EA FLU CLINIC NURSE [facility]        Today's Diagnoses     Need for prophylactic vaccination and inoculation against influenza    -  1       Follow-ups after your visit        Your next 10 appointments already scheduled     Nov 16, 2017  7:30 AM CST   Office Visit with Sandi Eastman MD   [facility] ([facility])    [address]  [address]  [facility] [id number] [phone]           Bring a current list of meds and any records pertaining to this visit. For Physicals, please bring immunization records and any forms needing to be filled out. Please arrive 10 minutes early to complete paperwork.              Who to contact     If you have questions or need follow up information about today's clinic visit or your schedule please contact [facility] directly at [phone].  Normal or non-critical lab and imaging results will be communicated to you by Webflowhart, letter or phone within 4 business days after the clinic has received the results. If you do not hear from us within 7 days, please contact the clinic through Akamedia or phone. If you have a critical or abnormal lab result, we will notify you by phone as soon as possible.  Submit refill requests through Akamedia or call your pharmacy and they will forward the refill request to us. Please allow 3 business days for your refill to be completed.          Additional Information About Your Visit        WebflowharCitrine Informatics Information     Akamedia gives you secure access to your electronic health record. If you see a primary care provider, you can also send messages to your care team and make appointments. If you have questions, please call your primary care clinic.  If you do not have a  primary care provider, please call [phone] and they will assist you.        Care EveryWhere ID     This is your Care EveryWhere ID. This could be used by other organizations to access your [place] medical records  [id number]         Blood Pressure from Last 3 Encounters:   04/13/17 136/74   04/11/17 140/80   11/22/16 130/78    Weight from Last 3 Encounters:   04/13/17 218 lb 7 oz (99.1 kg)   04/11/17 220 lb 3.2 oz (99.9 kg)   11/22/16 218 lb 1 oz (98.9 kg)              We Performed the Following     FLU VACCINE, INCREASED ANTIGEN, PRESV FREE, AGE 65+ [14927]     Vaccine Administration, Initial [95983]        Primary Care Provider Office Phone # Fax #    Sandi Eastman [phone] [address] [place] [address]        Equal Access to Services     [facility]: Hadii aad ku hadasho Soomaali, waaxda luqadaha, qaybta kaalmada adeegyada, waxay gracielain hayjennifern sylvain bennett . So [facility] [phone].    ATENCIÓN: Si habla español, tiene a edward disposición servicios gratuitos de asistencia lingüística. Quintename al [phone].    We comply with applicable federal civil rights laws and [place] laws. We do not discriminate on the basis of race, color, national origin, age, disability, sex, sexual orientation, or gender identity.            Thank you!     Thank you for choosing [facility]  for your care. Our goal is always to provide you with excellent care. Hearing back from our patients is one way we can continue to improve our services. Please take a few minutes to complete the written survey that you may receive in the mail after your visit with us. Thank you!             Your Updated Medication List - Protect others around you: Learn how to safely use, store and throw away your medicines at www.disposemymeds.org.          This list is accurate as of: 10/26/17 11:35 AM.  Always use your most recent med list.                   Brand Name Dispense Instructions for  use Diagnosis    albuterol 108 (90 BASE) MCG/ACT Inhaler    PROAIR HFA/PROVENTIL HFA/VENTOLIN HFA    1 Inhaler    Inhale 2 puffs into the lungs every 6 hours as needed for shortness of breath / dyspnea or wheezing    Acute bronchospasm       aspirin 81 MG tablet     100    ONE DAILY    Type II or unspecified type diabetes mellitus without mention of complication, not stated as uncontrolled, Unspecified essential hypertension       azithromycin 250 MG tablet    ZITHROMAX    6 tablet    Two tablets first day, then one tablet daily for four days.    Acute bronchitis with symptoms > 10 days       blood glucose monitoring meter device kit     1 kit    Use to test blood sugars 2-3 times daily as directed. Brand per insurance formulary    Type II or unspecified type diabetes mellitus without mention of complication, not stated as uncontrolled       blood glucose monitoring test strip    ONE TOUCH ULTRA    100 each    Test 2-3 times daily as directed, brand per insurance formulary.    Type 2 diabetes mellitus without complication, without long-term current use of insulin (H)       fluticasone 50 MCG/ACT spray    FLONASE    3 Bottle    Spray 1-2 sprays into both nostrils daily    Dysfunction of Eustachian tube, right       glimepiride 1 MG tablet    AMARYL    90 tablet    Take 1 tablet (1 mg) by mouth every morning (before breakfast)    Type 2 diabetes mellitus without complication, without long-term current use of insulin (H)       losartan-hydrochlorothiazide 50-12.5 MG per tablet    HYZAAR    90 tablet    Take 1 tablet by mouth daily    Hypertension goal BP (blood pressure) < 140/90       metFORMIN 1000 MG tablet    GLUCOPHAGE    180 tablet    Take 1 tablet (1,000 mg) by mouth 2 times daily (with meals)    Type 2 diabetes mellitus without complication, without long-term current use of insulin (H)       Multi-vitamin Tabs tablet   Generic drug:  multivitamin, therapeutic with minerals      1 po qd        simvastatin 40 MG  tablet    ZOCOR    90 tablet    TAKE 1 TABLET AT BEDTIME    Pure hypercholesterolemia

## 2017-11-16 ENCOUNTER — OFFICE VISIT (OUTPATIENT)
Dept: PEDIATRICS | Facility: CLINIC | Age: 77
End: 2017-11-16
Payer: COMMERCIAL

## 2017-11-16 ENCOUNTER — HOSPITAL ENCOUNTER (OUTPATIENT)
Facility: CLINIC | Age: 77
Setting detail: SPECIMEN
Discharge: HOME | End: 2017-11-16
Attending: INTERNAL MEDICINE | Admitting: INTERNAL MEDICINE
Payer: MEDICARE

## 2017-11-16 ENCOUNTER — ONCOLOGY VISIT (OUTPATIENT)
Dept: ONCOLOGY | Facility: CLINIC | Age: 77
End: 2017-11-16
Attending: INTERNAL MEDICINE
Payer: COMMERCIAL

## 2017-11-16 VITALS
HEART RATE: 92 BPM | WEIGHT: 212 LBS | SYSTOLIC BLOOD PRESSURE: 130 MMHG | DIASTOLIC BLOOD PRESSURE: 89 MMHG | TEMPERATURE: 97.2 F | RESPIRATION RATE: 16 BRPM | BODY MASS INDEX: 31.4 KG/M2 | HEIGHT: 69 IN | OXYGEN SATURATION: 97 %

## 2017-11-16 VITALS
BODY MASS INDEX: 31.89 KG/M2 | DIASTOLIC BLOOD PRESSURE: 85 MMHG | TEMPERATURE: 97.6 F | SYSTOLIC BLOOD PRESSURE: 128 MMHG | WEIGHT: 212.8 LBS | HEART RATE: 75 BPM

## 2017-11-16 DIAGNOSIS — I10 HYPERTENSION GOAL BP (BLOOD PRESSURE) < 140/90: ICD-10-CM

## 2017-11-16 DIAGNOSIS — Z13.89 SCREENING FOR DIABETIC PERIPHERAL NEUROPATHY: ICD-10-CM

## 2017-11-16 DIAGNOSIS — D72.829 LEUKOCYTOSIS, UNSPECIFIED TYPE: Primary | ICD-10-CM

## 2017-11-16 DIAGNOSIS — E78.5 HYPERLIPIDEMIA LDL GOAL <100: ICD-10-CM

## 2017-11-16 DIAGNOSIS — E11.9 TYPE 2 DIABETES MELLITUS WITHOUT COMPLICATION, WITHOUT LONG-TERM CURRENT USE OF INSULIN (H): Primary | ICD-10-CM

## 2017-11-16 DIAGNOSIS — D72.829 LEUKOCYTOSIS, UNSPECIFIED TYPE: ICD-10-CM

## 2017-11-16 DIAGNOSIS — R53.83 FATIGUE, UNSPECIFIED TYPE: ICD-10-CM

## 2017-11-16 LAB
ALBUMIN SERPL-MCNC: 3.9 G/DL (ref 3.4–5)
ALP SERPL-CCNC: 55 U/L (ref 40–150)
ALT SERPL W P-5'-P-CCNC: 25 U/L (ref 0–70)
ANION GAP SERPL CALCULATED.3IONS-SCNC: 9 MMOL/L (ref 3–14)
AST SERPL W P-5'-P-CCNC: 15 U/L (ref 0–45)
BILIRUB SERPL-MCNC: 0.6 MG/DL (ref 0.2–1.3)
BUN SERPL-MCNC: 29 MG/DL (ref 7–30)
CALCIUM SERPL-MCNC: 9 MG/DL (ref 8.5–10.1)
CHLORIDE SERPL-SCNC: 107 MMOL/L (ref 94–109)
CHOLEST SERPL-MCNC: 109 MG/DL
CO2 SERPL-SCNC: 25 MMOL/L (ref 20–32)
COPATH REPORT: NORMAL
CREAT SERPL-MCNC: 1.19 MG/DL (ref 0.66–1.25)
CREAT UR-MCNC: 234 MG/DL
GFR SERPL CREATININE-BSD FRML MDRD: 60 ML/MIN/1.7M2
GLUCOSE SERPL-MCNC: 139 MG/DL (ref 70–99)
HBA1C MFR BLD: 6.3 % (ref 4.3–6)
HDLC SERPL-MCNC: 33 MG/DL
LDH SERPL L TO P-CCNC: 188 U/L (ref 85–227)
LDLC SERPL CALC-MCNC: 54 MG/DL
MICROALBUMIN UR-MCNC: 22 MG/L
MICROALBUMIN/CREAT UR: 9.19 MG/G CR (ref 0–17)
NONHDLC SERPL-MCNC: 76 MG/DL
POTASSIUM SERPL-SCNC: 4.1 MMOL/L (ref 3.4–5.3)
PROT SERPL-MCNC: 7.3 G/DL (ref 6.8–8.8)
RETICS # AUTO: 52.6 10E9/L (ref 25–95)
RETICS/RBC NFR AUTO: 1.7 % (ref 0.5–2)
SODIUM SERPL-SCNC: 141 MMOL/L (ref 133–144)
TRIGL SERPL-MCNC: 111 MG/DL
TSH SERPL DL<=0.005 MIU/L-ACNC: 2.63 MU/L (ref 0.4–4)
URATE SERPL-MCNC: 7.4 MG/DL (ref 3.5–7.2)

## 2017-11-16 PROCEDURE — 85045 AUTOMATED RETICULOCYTE COUNT: CPT | Performed by: INTERNAL MEDICINE

## 2017-11-16 PROCEDURE — 83036 HEMOGLOBIN GLYCOSYLATED A1C: CPT | Performed by: INTERNAL MEDICINE

## 2017-11-16 PROCEDURE — 84550 ASSAY OF BLOOD/URIC ACID: CPT | Performed by: INTERNAL MEDICINE

## 2017-11-16 PROCEDURE — 88185 FLOWCYTOMETRY/TC ADD-ON: CPT | Performed by: INTERNAL MEDICINE

## 2017-11-16 PROCEDURE — 82043 UR ALBUMIN QUANTITATIVE: CPT | Performed by: INTERNAL MEDICINE

## 2017-11-16 PROCEDURE — 85025 COMPLETE CBC W/AUTO DIFF WBC: CPT | Performed by: INTERNAL MEDICINE

## 2017-11-16 PROCEDURE — 99205 OFFICE O/P NEW HI 60 MIN: CPT | Performed by: INTERNAL MEDICINE

## 2017-11-16 PROCEDURE — 85060 BLOOD SMEAR INTERPRETATION: CPT | Performed by: INTERNAL MEDICINE

## 2017-11-16 PROCEDURE — 80061 LIPID PANEL: CPT | Performed by: INTERNAL MEDICINE

## 2017-11-16 PROCEDURE — 99211 OFF/OP EST MAY X REQ PHY/QHP: CPT

## 2017-11-16 PROCEDURE — 88184 FLOWCYTOMETRY/ TC 1 MARKER: CPT | Performed by: INTERNAL MEDICINE

## 2017-11-16 PROCEDURE — 36415 COLL VENOUS BLD VENIPUNCTURE: CPT

## 2017-11-16 PROCEDURE — 40001005 ZZHCL STATISTIC FLOW >15 ABY TC 88189: Performed by: INTERNAL MEDICINE

## 2017-11-16 PROCEDURE — 83615 LACTATE (LD) (LDH) ENZYME: CPT | Performed by: INTERNAL MEDICINE

## 2017-11-16 PROCEDURE — 99207 C FOOT EXAM  NO CHARGE: CPT | Mod: 25 | Performed by: INTERNAL MEDICINE

## 2017-11-16 PROCEDURE — 99214 OFFICE O/P EST MOD 30 MIN: CPT | Mod: 25 | Performed by: INTERNAL MEDICINE

## 2017-11-16 RX ORDER — ATORVASTATIN CALCIUM 20 MG/1
20 TABLET, FILM COATED ORAL DAILY
Qty: 90 TABLET | Refills: 3 | Status: ON HOLD | OUTPATIENT
Start: 2017-11-16 | End: 2017-12-04

## 2017-11-16 RX ORDER — GLIMEPIRIDE 1 MG/1
1 TABLET ORAL
Qty: 90 TABLET | Refills: 3 | Status: SHIPPED | OUTPATIENT
Start: 2017-11-16 | End: 2018-10-10

## 2017-11-16 RX ORDER — LOSARTAN POTASSIUM AND HYDROCHLOROTHIAZIDE 12.5; 5 MG/1; MG/1
1 TABLET ORAL DAILY
Qty: 90 TABLET | Refills: 3 | Status: SHIPPED | OUTPATIENT
Start: 2017-11-16 | End: 2018-10-10

## 2017-11-16 ASSESSMENT — PAIN SCALES - GENERAL: PAINLEVEL: NO PAIN (0)

## 2017-11-16 NOTE — NURSING NOTE
"Oncology Rooming Note    November 16, 2017 2:52 PM   Austin Garza is a 77 year old male who presents for:    Chief Complaint   Patient presents with     Oncology Clinic Visit     New Patient     Initial Vitals: /89  Pulse 92  Temp 97.2  F (36.2  C) (Tympanic)  Resp 16  Ht 1.74 m (5' 8.5\")  Wt 96.2 kg (212 lb)  SpO2 97%  BMI 31.77 kg/m2 Estimated body mass index is 31.77 kg/(m^2) as calculated from the following:    Height as of this encounter: 1.74 m (5' 8.5\").    Weight as of this encounter: 96.2 kg (212 lb). Body surface area is 2.16 meters squared.  No Pain (0) Comment: Data Unavailable   No LMP for male patient.  Allergies reviewed: Yes  Medications reviewed: Yes    Medications: Medication refills not needed today.  Pharmacy name entered into SimuForm:    [id number] - [place], [place] - [address] AT [facility] [address]    Clinical concerns: New Patient- PCP wanted pt to follow up for abnormal lab work    8 minutes for nursing intake (face to face time)     Cherry Rodriguez CMA     DISCHARGE PLAN:  Next appointments: See patient instruction section  Departure Mode: Ambulatory  Accompanied by: wife  0 minutes for nursing discharge (face to face time)   Cherry Rodriguez CMA                  "

## 2017-11-16 NOTE — MR AVS SNAPSHOT
After Visit Summary   11/16/2017    Austin Garza    MRN: [id number]           Patient Information     Date Of Birth          1940        Visit Information        Provider Department      11/16/2017 7:30 AM Sandi Eastman MD [facility]        Today's Diagnoses     Screening for diabetic peripheral neuropathy    -  1    Hypertension goal BP (blood pressure) < 140/90        Type 2 diabetes mellitus without complication, without long-term current use of insulin (H)        Hyperlipidemia LDL goal <100        Fatigue, unspecified type          Care Instructions    All of your medications were refilled. I would like for you to switch from simvastatin to atorvastatin (this is a stronger, safer medication). Let me know if this is goo expensive and we can switch you back to simvastatin.    We will check labs today. I will call with these results. We will also check for some causes of fatigue.     Come back in 6 months.    Let me know if you get worsening shortness of breath with activity.           Follow-ups after your visit        Who to contact     If you have questions or need follow up information about today's clinic visit or your schedule please contact [facility] directly at [phone].  Normal or non-critical lab and imaging results will be communicated to you by Palmhart, letter or phone within 4 business days after the clinic has received the results. If you do not hear from us within 7 days, please contact the clinic through Advitecht or phone. If you have a critical or abnormal lab result, we will notify you by phone as soon as possible.  Submit refill requests through Nextly or call your pharmacy and they will forward the refill request to us. Please allow 3 business days for your refill to be completed.          Additional Information About Your Visit        Palmhart Information     Nextly gives you secure access to your electronic health record. If you see a  primary care provider, you can also send messages to your care team and make appointments. If you have questions, please call your primary care clinic.  If you do not have a primary care provider, please call [phone] and they will assist you.        Care EveryWhere ID     This is your Care EveryWhere ID. This could be used by other organizations to access your [place] medical records  [id number]        Your Vitals Were     Pulse Temperature BMI (Body Mass Index)             75 97.6  F (36.4  C) (Tympanic) 31.89 kg/m2          Blood Pressure from Last 3 Encounters:   11/16/17 128/85   04/13/17 136/74   04/11/17 140/80    Weight from Last 3 Encounters:   11/16/17 212 lb 12.8 oz (96.5 kg)   04/13/17 218 lb 7 oz (99.1 kg)   04/11/17 220 lb 3.2 oz (99.9 kg)              We Performed the Following     Albumin Random Urine Quantitative with Creat Ratio     CBC with platelets differential     Comprehensive metabolic panel (BMP + Alb, Alk Phos, ALT, AST, Total. Bili, TP)     FOOT EXAM  NO CHARGE [17765.114]     HEMOGLOBIN A1C     Lipid panel reflex to direct LDL Fasting     TSH with free T4 reflex          Today's Medication Changes          These changes are accurate as of: 11/16/17  8:05 AM.  If you have any questions, ask your nurse or doctor.               Start taking these medicines.        Dose/Directions    atorvastatin 20 MG tablet   Commonly known as:  LIPITOR   Used for:  Type 2 diabetes mellitus without complication, without long-term current use of insulin (H), Hyperlipidemia LDL goal <100   Started by:  Sandi Eastman MD        Dose:  20 mg   Take 1 tablet (20 mg) by mouth daily   Quantity:  90 tablet   Refills:  3         Stop taking these medicines if you haven't already. Please contact your care team if you have questions.     simvastatin 40 MG tablet   Commonly known as:  ZOCOR   Stopped by:  Sandi Eastman MD                Where to get your medicines      These medications were sent to [facility]  Pharmacy Mail Delivery - [place], [place] - [address]  [address], [facility] [address]     Phone:  [phone]     atorvastatin 20 MG tablet    glimepiride 1 MG tablet    losartan-hydrochlorothiazide 50-12.5 MG per tablet    metFORMIN 1000 MG tablet                Primary Care Provider Office Phone # Fax #    Sandi Eastman [phone] [address] [place] [address]        Equal Access to Services     [facility]: Hadii aad ku hadasho Soomaali, waaxda luqadaha, qaybta kaalmada adeegyada, waxay idiin hayaan adeeg kharash la'aan . So [facility] [phone].    ATENCIÓN: Si habla español, tiene a edward disposición servicios gratuitos de asistencia lingüística. [facility] [phone].    We comply with applicable federal civil rights laws and [place] laws. We do not discriminate on the basis of race, color, national origin, age, disability, sex, sexual orientation, or gender identity.            Thank you!     Thank you for choosing [facility]  for your care. Our goal is always to provide you with excellent care. Hearing back from our patients is one way we can continue to improve our services. Please take a few minutes to complete the written survey that you may receive in the mail after your visit with us. Thank you!             Your Updated Medication List - Protect others around you: Learn how to safely use, store and throw away your medicines at www.disposemymeds.org.          This list is accurate as of: 11/16/17  8:05 AM.  Always use your most recent med list.                   Brand Name Dispense Instructions for use Diagnosis    albuterol 108 (90 BASE) MCG/ACT Inhaler    PROAIR HFA/PROVENTIL HFA/VENTOLIN HFA    1 Inhaler    Inhale 2 puffs into the lungs every 6 hours as needed for shortness of breath / dyspnea or wheezing    Acute bronchospasm       aspirin 81 MG tablet     100    ONE DAILY    Type II or unspecified type diabetes mellitus without  mention of complication, not stated as uncontrolled, Unspecified essential hypertension       atorvastatin 20 MG tablet    LIPITOR    90 tablet    Take 1 tablet (20 mg) by mouth daily    Type 2 diabetes mellitus without complication, without long-term current use of insulin (H), Hyperlipidemia LDL goal <100       blood glucose monitoring meter device kit     1 kit    Use to test blood sugars 2-3 times daily as directed. Brand per insurance formulary    Type II or unspecified type diabetes mellitus without mention of complication, not stated as uncontrolled       blood glucose monitoring test strip    ONETOUCH ULTRA    100 each    Test 2-3 times daily as directed, brand per insurance formulary.    Type 2 diabetes mellitus without complication, without long-term current use of insulin (H)       fluticasone 50 MCG/ACT spray    FLONASE    3 Bottle    Spray 1-2 sprays into both nostrils daily    Dysfunction of Eustachian tube, right       glimepiride 1 MG tablet    AMARYL    90 tablet    Take 1 tablet (1 mg) by mouth every morning (before breakfast)    Type 2 diabetes mellitus without complication, without long-term current use of insulin (H)       losartan-hydrochlorothiazide 50-12.5 MG per tablet    HYZAAR    90 tablet    Take 1 tablet by mouth daily    Hypertension goal BP (blood pressure) < 140/90       metFORMIN 1000 MG tablet    GLUCOPHAGE    180 tablet    Take 1 tablet (1,000 mg) by mouth 2 times daily (with meals)    Type 2 diabetes mellitus without complication, without long-term current use of insulin (H)       Multi-vitamin Tabs tablet   Generic drug:  multivitamin, therapeutic with minerals      1 po qd

## 2017-11-16 NOTE — NURSING NOTE
"Chief Complaint   Patient presents with     Diabetes       Initial /85  Pulse 75  Temp 97.6  F (36.4  C) (Tympanic)  Wt 212 lb 12.8 oz (96.5 kg)  BMI 31.89 kg/m2 Estimated body mass index is 31.89 kg/(m^2) as calculated from the following:    Height as of 4/13/17: 5' 8.5\" (1.74 m).    Weight as of this encounter: 212 lb 12.8 oz (96.5 kg).  Medication Reconciliation: complete  "

## 2017-11-16 NOTE — PATIENT INSTRUCTIONS
All of your medications were refilled. I would like for you to switch from simvastatin to atorvastatin (this is a stronger, safer medication). Let me know if this is goo expensive and we can switch you back to simvastatin.    We will check labs today. I will call with these results. We will also check for some causes of fatigue.     Come back in 6 months.    Let me know if you get worsening shortness of breath with activity.

## 2017-11-16 NOTE — PATIENT INSTRUCTIONS
-labs today-Clarisa  -schedule bone marrow biopsy tomorrow-Ok per Dr. Perry to have Bone marrow biopsy done next week.  Scheduled for biopsy on 11/21/17 @ [facility].  Per Dr. Perry to see pt 2 wks after biopsy, scheduled for 12/5/17 for F/U at [facility]. Cristin BRADLEY printed and given to Pt. Cristin RAYMUNDO

## 2017-11-16 NOTE — MR AVS SNAPSHOT
After Visit Summary   11/16/2017    Austin Garza    MRN: [id number]           Patient Information     Date Of Birth          1940        Visit Information        Provider Department      11/16/2017 2:45 PM Breana Perry MD [facility]        Today's Diagnoses     Leukocytosis, unspecified type    -  1      Care Instructions      -labs today-Clarisa  -schedule bone marrow biopsy tomorrow          Follow-ups after your visit        Future tests that were ordered for you today     Open Future Orders        Priority Expected Expires Ordered    Bone marrow biopsy Routine  5/15/2018 11/16/2017    Leukemia Lymphoma Evaluation (Flow Cytometry) Routine  5/15/2018 11/16/2017            Who to contact     If you have questions or need follow up information about today's clinic visit or your schedule please contact [facility] directly at [phone].  Normal or non-critical lab and imaging results will be communicated to you by Aster [employer] Healthcarehart, letter or phone within 4 business days after the clinic has received the results. If you do not hear from us within 7 days, please contact the clinic through [employer] or phone. If you have a critical or abnormal lab result, we will notify you by phone as soon as possible.  Submit refill requests through Write.my or call your pharmacy and they will forward the refill request to us. Please allow 3 business days for your refill to be completed.          Additional Information About Your Visit        MyChart Information     Write.my gives you secure access to your electronic health record. If you see a primary care provider, you can also send messages to your care team and make appointments. If you have questions, please call your primary care clinic.  If you do not have a primary care provider, please call [phone] and they will assist you.        Care EveryWhere ID     This is your Care EveryWhere ID. This could be  "used by other organizations to access your [place] medical records  [id number]        Your Vitals Were     Pulse Temperature Respirations Height Pulse Oximetry BMI (Body Mass Index)    92 97.2  F (36.2  C) (Tympanic) 16 1.74 m (5' 8.5\") 97% 31.77 kg/m2       Blood Pressure from Last 3 Encounters:   11/16/17 130/89   11/16/17 128/85   04/13/17 136/74    Weight from Last 3 Encounters:   11/16/17 96.2 kg (212 lb)   11/16/17 96.5 kg (212 lb 12.8 oz)   04/13/17 99.1 kg (218 lb 7 oz)              We Performed the Following     Leukemia Lymphoma Evaluation (Flow Cytometry)          Today's Medication Changes          These changes are accurate as of: 11/16/17  4:22 PM.  If you have any questions, ask your nurse or doctor.               Start taking these medicines.        Dose/Directions    atorvastatin 20 MG tablet   Commonly known as:  LIPITOR   Used for:  Type 2 diabetes mellitus without complication, without long-term current use of insulin (H), Hyperlipidemia LDL goal <100   Started by:  Sandi Eastman MD        Dose:  20 mg   Take 1 tablet (20 mg) by mouth daily   Quantity:  90 tablet   Refills:  3         Stop taking these medicines if you haven't already. Please contact your care team if you have questions.     simvastatin 40 MG tablet   Commonly known as:  ZOCOR   Stopped by:  Sandi Eastman MD                Where to get your medicines      These medications were sent to [facility] - [facility] [address]  [address], [facility] [address]     Phone:  [phone]     atorvastatin 20 MG tablet    glimepiride 1 MG tablet    losartan-hydrochlorothiazide 50-12.5 MG per tablet    metFORMIN 1000 MG tablet                Primary Care Provider Office Phone # Fax #    Sandi Eastman [phone] [address] [place] [address]        Equal Access to Services     BAKARI OSMAN AH: Robert Valencia, waaxda luqadagilmar, qaybta johnaldanielle " mina rodriguezsheri pravinabe cai'aan ah. So [facility] [phone].    ATENCIÓN: Si robinsonla josephine, tiene a edward disposición servicios gratuitos de asistencia lingüística. Anika al [phone].    We comply with applicable federal civil rights laws and [place] laws. We do not discriminate on the basis of race, color, national origin, age, disability, sex, sexual orientation, or gender identity.            Thank you!     Thank you for choosing [facility]  for your care. Our goal is always to provide you with excellent care. Hearing back from our patients is one way we can continue to improve our services. Please take a few minutes to complete the written survey that you may receive in the mail after your visit with us. Thank you!             Your Updated Medication List - Protect others around you: Learn how to safely use, store and throw away your medicines at www.disposemymeds.org.          This list is accurate as of: 11/16/17  4:22 PM.  Always use your most recent med list.                   Brand Name Dispense Instructions for use Diagnosis    albuterol 108 (90 BASE) MCG/ACT Inhaler    PROAIR HFA/PROVENTIL HFA/VENTOLIN HFA    1 Inhaler    Inhale 2 puffs into the lungs every 6 hours as needed for shortness of breath / dyspnea or wheezing    Acute bronchospasm       aspirin 81 MG tablet     100    ONE DAILY    Type II or unspecified type diabetes mellitus without mention of complication, not stated as uncontrolled, Unspecified essential hypertension       atorvastatin 20 MG tablet    LIPITOR    90 tablet    Take 1 tablet (20 mg) by mouth daily    Type 2 diabetes mellitus without complication, without long-term current use of insulin (H), Hyperlipidemia LDL goal <100       blood glucose monitoring meter device kit     1 kit    Use to test blood sugars 2-3 times daily as directed. Brand per insurance formulary    Type II or unspecified type diabetes mellitus without mention of  complication, not stated as uncontrolled       blood glucose monitoring test strip    ONETOUCH ULTRA    100 each    Test 2-3 times daily as directed, brand per insurance formulary.    Type 2 diabetes mellitus without complication, without long-term current use of insulin (H)       fluticasone 50 MCG/ACT spray    FLONASE    3 Bottle    Spray 1-2 sprays into both nostrils daily    Dysfunction of Eustachian tube, right       glimepiride 1 MG tablet    AMARYL    90 tablet    Take 1 tablet (1 mg) by mouth every morning (before breakfast)    Type 2 diabetes mellitus without complication, without long-term current use of insulin (H)       losartan-hydrochlorothiazide 50-12.5 MG per tablet    HYZAAR    90 tablet    Take 1 tablet by mouth daily    Hypertension goal BP (blood pressure) < 140/90       metFORMIN 1000 MG tablet    GLUCOPHAGE    180 tablet    Take 1 tablet (1,000 mg) by mouth 2 times daily (with meals)    Type 2 diabetes mellitus without complication, without long-term current use of insulin (H)       Multi-vitamin Tabs tablet   Generic drug:  multivitamin, therapeutic with minerals      1 po qd

## 2017-11-16 NOTE — PROGRESS NOTES
[facility]    Hematology/Oncology New Patient Note      Today's Date: 17    Reason for Consult: leukocytosis      HISTORY OF PRESENT ILLNESS: Austin Garza is a 77 year old male with PMHx of DMII, HTN who presents with leukocytosis.  He saw his PCP this morning, and had a CBC checked.  He was found to have elevated WBC of 61.7K, hemoglobin of 10.4, and platelet of 115K.  There were no other CBC results available between  and .  Prior to , he had normal CBC, with normal to mild anemia, and mild thrombocytopenia.      Austin is here with his wife, Marcie.  He says that he has been feeling fine.  He notes a bit of fatigue but attributed it to age.  He denies fever/chills.  He notes that he notices that his neck is wet around 4 am, but does not think he has drenching night sweats.  He denies unintentional weight loss.  He denies pain or easy bleeding/bruising.      He quit smoking in .  He drinks 1-3 glasses of wine a night with dinner.  He is retired, and used to work as a .  He lives with his wife in [place].  His cousin had lung cancer and  around age 69.  Otherwise, he denies family history of cancer.        REVIEW OF SYSTEMS:   14 point ROS was reviewed and is negative other than as noted above in HPI.       HOME MEDICATIONS:  Current Outpatient Prescriptions   Medication Sig Dispense Refill     glimepiride (AMARYL) 1 MG tablet Take 1 tablet (1 mg) by mouth every morning (before breakfast) 90 tablet 3     metFORMIN (GLUCOPHAGE) 1000 MG tablet Take 1 tablet (1,000 mg) by mouth 2 times daily (with meals) 180 tablet 3     losartan-hydrochlorothiazide (HYZAAR) 50-12.5 MG per tablet Take 1 tablet by mouth daily 90 tablet 3     atorvastatin (LIPITOR) 20 MG tablet Take 1 tablet (20 mg) by mouth daily 90 tablet 3     blood glucose monitoring (ONE TOUCH ULTRA) test strip Test 2-3 times daily as directed, brand per insurance formulary. 100 each 0     fluticasone  (FLONASE) 50 MCG/ACT spray Spray 1-2 sprays into both nostrils daily 3 Bottle 11     albuterol (PROAIR HFA/PROVENTIL HFA/VENTOLIN HFA) 108 (90 BASE) MCG/ACT Inhaler Inhale 2 puffs into the lungs every 6 hours as needed for shortness of breath / dyspnea or wheezing 1 Inhaler 0     Blood Glucose Monitoring Suppl (ONE TOUCH ULTRA 2) W/DEVICE KIT Use to test blood sugars 2-3 times daily as directed. Brand per insurance formulary   1 kit 0     ASPIRIN 81 MG OR TABS ONE DAILY 100 3     MULTI-VITAMIN OR TABS 1 po qd       [DISCONTINUED] metFORMIN (GLUCOPHAGE) 1000 MG tablet Take 1 tablet (1,000 mg) by mouth 2 times daily (with meals) 180 tablet 3     [DISCONTINUED] glimepiride (AMARYL) 1 MG tablet Take 1 tablet (1 mg) by mouth every morning (before breakfast) 90 tablet 3     [DISCONTINUED] losartan-hydrochlorothiazide (HYZAAR) 50-12.5 MG per tablet Take 1 tablet by mouth daily 90 tablet 3         ALLERGIES:  Allergies   Allergen Reactions     Ace Inhibitors      cough         PAST MEDICAL HISTORY:  Past Medical History:   Diagnosis Date     Diaphragmatic hernia without mention of obstruction or gangrene      Diverticulitis of colon (without mention of hemorrhage)(562.11)      Esophageal reflux      Irritable bowel syndrome      Macular degeneration (senile) of retina, unspecified      Squamous Cell Carcinoma, Back 1988     Type II or unspecified type diabetes mellitus without mention of complication, not stated as uncontrolled      Unspecified essential hypertension          PAST SURGICAL HISTORY:  Past Surgical History:   Procedure Laterality Date     APPENDECTOMY  1966     COLONOSCOPY       SURGICAL HISTORY OF -   1985-90    Squamous cell skin cancer resection - back     SURGICAL HISTORY OF -   1998    skin tags resection     SURGICAL HISTORY OF -   2/2001    stress thall. normal     SURGICAL HISTORY OF -   5/2002    colonoscopy     SURGICAL HISTORY OF -   2007    Fistulotomy with marsupialization for repair of fisture  "in ano         SOCIAL HISTORY:  Social History     Social History     Marital status:      Spouse name: librado     Number of children: 0     Years of education: 17     Occupational History     retired      Social History Main Topics     Smoking status: Former Smoker     Packs/day: 0.50     Years: 20.00     Quit date: 1/1/1975     Smokeless tobacco: Former User     Alcohol use 6.0 - 8.4 oz/week     10 - 14 Standard drinks or equivalent per week     Drug use: No     Sexual activity: No     Other Topics Concern     Not on file     Social History Narrative         FAMILY HISTORY:  Family History   Problem Relation Age of Onset     CEREBROVASCULAR DISEASE Father      x 2     Hypertension Mother      C.A.D. Mother      CANCER Mother      skin     Eye Disorder Mother      glaucoma     Prostate Cancer No family hx of      Cancer - colorectal No family hx of          PHYSICAL EXAM:  Vital signs:  /89  Pulse 92  Temp 97.2  F (36.2  C) (Tympanic)  Resp 16  Ht 1.74 m (5' 8.5\")  Wt 96.2 kg (212 lb)  SpO2 97%  BMI 31.77 kg/m2   GENERAL/CONSTITUTIONAL: No acute distress.  EYES: No scleral icterus.  LYMPH: No anterior cervical, posterior cervical, or supraclavicular adenopathy.   RESPIRATORY: Clear to auscultation bilaterally. No crackles or wheezing.   CARDIOVASCULAR: Regular rate and rhythm without murmurs, gallops, or rubs.  GASTROINTESTINAL: No tenderness. The patient has normal bowel sounds. No guarding.  No distention.  MUSCULOSKELETAL: Warm and well-perfused, no cyanosis, clubbing, or edema.  NEUROLOGIC: Alert, oriented, answers questions appropriately.  INTEGUMENTARY: No jaundice.      LABS:  CBC RESULTS:   Recent Labs   Lab Test  11/16/17   0804   WBC  61.7*   RBC  2.92*   HGB  10.4*   HCT  30.9*   MCV  106*   MCH  35.6*   MCHC  33.7   RDW  14.9   PLT  115*       Recent Labs   Lab Test  11/16/17   0804  11/17/16   0731   NA  141  141   POTASSIUM  4.1  4.0   CHLORIDE  107  105   CO2  25  26   ANIONGAP  9  " 10   GLC  139*  132*   BUN  29  20   CR  1.19  1.13   MAGDALENO  9.0  9.0         PATHOLOGY:  Peripheral smear 11/16/17:  FINAL DIAGNOSIS:   Peripheral Blood Morphology-   - Marked leukocytosis with atypical absolute lymphocytosis.   - Mild macrocytic, normochromic anemia with increased rouleaux.   - Mild thrombocytopenia.   - See comment.     COMMENT:   The morphologic findings would be compatible with peripheral blood   involvement by a low-grade lymphoproliferative disorder; correlation   with the currently pending peripheral blood immunophenotyping is   recommended.     Macrocytic anemia is present.  Vitamin B12 and red cell folate levels   are recommended.  Other possible etiologies include alcohol abuse, liver   disease, medications, and hypothyroidism.  No dysplastic changes are   present to suggest a myelodysplastic syndrome.     Some possible causes to consider for patient's thrombocytopenia include   infection, adverse medication reactions, alcohol abuse, nutritional   deficiency, immune-mediated, or peripheral platelet destruction,   consumption or sequestration.         ASSESSMENT/PLAN:  Austin Garza is a 77 year old male with:    1) Leukocytosis: WBC of 61.7K, hemoglobin of 10.4, platelet count of 115K.  His last CBC was in 2010, with normal WBC, so unclear how long these abnormalities have been present, although since he is fairly asymptomatic, I suspect that this is a chronic process.  The differential on the WBC is still pending.  I called the lab, and the blood work was just sent over from [place], so it has not been processed yet.  I called the pathologist, and she did review the smear quickly, which showed marked leukocytosis with atypical absolute lymphocytosis, with mild macrocytic, normochromic anemia with increased rouleaux, and mild thrombocytopenia.  No dysplastic changes to suggest a myelodysplastic disorder.  The suspicion currently is for a low grade lymphoproliferative disorder, such as CLL.   I had a peripheral flow cytometry sent today, which will provide more information.  I will also have patient undergo bone marrow biopsy as soon as possible, and patient agrees to the procedure.   Uric acid is slightly high at 7.4.  LDH is normal.  There is low suspicion for an acute leukemia, but will await further testing.    -flow cytometry sent today  -schedule bone marrow biopsy tomorrow or next week  -RTC after bone marrow biopsy is done      I spent a total of 60 minutes with the patient, with over >50% of the time in counseling and/or coordination of care.      Breana Perry MD  Hematology/Oncology  [facility]

## 2017-11-16 NOTE — LETTER
2017         RE: Austin Garza  [address] [id number]        Dear Colleague,    Thank you for referring your patient, Austin Garza, to the [facility]. Please see a copy of my visit note below.    [facility]    Hematology/Oncology New Patient Note      Today's Date: 17    Reason for Consult: leukocytosis      HISTORY OF PRESENT ILLNESS: Austin Garza is a 77 year old male with PMHx of DMII, HTN who presents with leukocytosis.  He saw his PCP this morning, and had a CBC checked.  He was found to have elevated WBC of 61.7K, hemoglobin of 10.4, and platelet of 115K.  There were no other CBC results available between  and .  Prior to , he had normal CBC, with normal to mild anemia, and mild thrombocytopenia.      Austin is here with his wife, Marcie.  He says that he has been feeling fine.  He notes a bit of fatigue but attributed it to age.  He denies fever/chills.  He notes that he notices that his neck is wet around 4 am, but does not think he has drenching night sweats.  He denies unintentional weight loss.  He denies pain or easy bleeding/bruising.      He quit smoking in .  He drinks 1-3 glasses of wine a night with dinner.  He is retired, and used to work as a .  He lives with his wife in [place].  His cousin had lung cancer and  around age 69.  Otherwise, he denies family history of cancer.        REVIEW OF SYSTEMS:   14 point ROS was reviewed and is negative other than as noted above in HPI.       HOME MEDICATIONS:  Current Outpatient Prescriptions   Medication Sig Dispense Refill     glimepiride (AMARYL) 1 MG tablet Take 1 tablet (1 mg) by mouth every morning (before breakfast) 90 tablet 3     metFORMIN (GLUCOPHAGE) 1000 MG tablet Take 1 tablet (1,000 mg) by mouth 2 times daily (with meals) 180 tablet 3     losartan-hydrochlorothiazide (HYZAAR) 50-12.5 MG per tablet Take 1 tablet by mouth  daily 90 tablet 3     atorvastatin (LIPITOR) 20 MG tablet Take 1 tablet (20 mg) by mouth daily 90 tablet 3     blood glucose monitoring (ONE TOUCH ULTRA) test strip Test 2-3 times daily as directed, brand per insurance formulary. 100 each 0     fluticasone (FLONASE) 50 MCG/ACT spray Spray 1-2 sprays into both nostrils daily 3 Bottle 11     albuterol (PROAIR HFA/PROVENTIL HFA/VENTOLIN HFA) 108 (90 BASE) MCG/ACT Inhaler Inhale 2 puffs into the lungs every 6 hours as needed for shortness of breath / dyspnea or wheezing 1 Inhaler 0     Blood Glucose Monitoring Suppl (ONE TOUCH ULTRA 2) W/DEVICE KIT Use to test blood sugars 2-3 times daily as directed. Brand per insurance formulary   1 kit 0     ASPIRIN 81 MG OR TABS ONE DAILY 100 3     MULTI-VITAMIN OR TABS 1 po qd       [DISCONTINUED] metFORMIN (GLUCOPHAGE) 1000 MG tablet Take 1 tablet (1,000 mg) by mouth 2 times daily (with meals) 180 tablet 3     [DISCONTINUED] glimepiride (AMARYL) 1 MG tablet Take 1 tablet (1 mg) by mouth every morning (before breakfast) 90 tablet 3     [DISCONTINUED] losartan-hydrochlorothiazide (HYZAAR) 50-12.5 MG per tablet Take 1 tablet by mouth daily 90 tablet 3         ALLERGIES:  Allergies   Allergen Reactions     Ace Inhibitors      cough         PAST MEDICAL HISTORY:  Past Medical History:   Diagnosis Date     Diaphragmatic hernia without mention of obstruction or gangrene      Diverticulitis of colon (without mention of hemorrhage)(562.11)      Esophageal reflux      Irritable bowel syndrome      Macular degeneration (senile) of retina, unspecified      Squamous Cell Carcinoma, Back 1988     Type II or unspecified type diabetes mellitus without mention of complication, not stated as uncontrolled      Unspecified essential hypertension          PAST SURGICAL HISTORY:  Past Surgical History:   Procedure Laterality Date     APPENDECTOMY  1966     COLONOSCOPY       SURGICAL HISTORY OF -   1985-90    Squamous cell skin cancer resection - back  "    SURGICAL HISTORY OF -   1998    skin tags resection     SURGICAL HISTORY OF -   2/2001    stress thall. normal     SURGICAL HISTORY OF -   5/2002    colonoscopy     SURGICAL HISTORY OF -   2007    Fistulotomy with marsupialization for repair of fisture in ano         SOCIAL HISTORY:  Social History     Social History     Marital status:      Spouse name: librado     Number of children: 0     Years of education: 17     Occupational History     retired      Social History Main Topics     Smoking status: Former Smoker     Packs/day: 0.50     Years: 20.00     Quit date: 1/1/1975     Smokeless tobacco: Former User     Alcohol use 6.0 - 8.4 oz/week     10 - 14 Standard drinks or equivalent per week     Drug use: No     Sexual activity: No     Other Topics Concern     Not on file     Social History Narrative         FAMILY HISTORY:  Family History   Problem Relation Age of Onset     CEREBROVASCULAR DISEASE Father      x 2     Hypertension Mother      C.A.D. Mother      CANCER Mother      skin     Eye Disorder Mother      glaucoma     Prostate Cancer No family hx of      Cancer - colorectal No family hx of          PHYSICAL EXAM:  Vital signs:  /89  Pulse 92  Temp 97.2  F (36.2  C) (Tympanic)  Resp 16  Ht 1.74 m (5' 8.5\")  Wt 96.2 kg (212 lb)  SpO2 97%  BMI 31.77 kg/m2   GENERAL/CONSTITUTIONAL: No acute distress.  EYES: No scleral icterus.  LYMPH: No anterior cervical, posterior cervical, or supraclavicular adenopathy.   RESPIRATORY: Clear to auscultation bilaterally. No crackles or wheezing.   CARDIOVASCULAR: Regular rate and rhythm without murmurs, gallops, or rubs.  GASTROINTESTINAL: No tenderness. The patient has normal bowel sounds. No guarding.  No distention.  MUSCULOSKELETAL: Warm and well-perfused, no cyanosis, clubbing, or edema.  NEUROLOGIC: Alert, oriented, answers questions appropriately.  INTEGUMENTARY: No jaundice.      LABS:  CBC RESULTS:   Recent Labs   Lab Test  11/16/17   0804   WBC  " 61.7*   RBC  2.92*   HGB  10.4*   HCT  30.9*   MCV  106*   MCH  35.6*   MCHC  33.7   RDW  14.9   PLT  115*       Recent Labs   Lab Test  11/16/17   0804  11/17/16   0731   NA  141  141   POTASSIUM  4.1  4.0   CHLORIDE  107  105   CO2  25  26   ANIONGAP  9  10   GLC  139*  132*   BUN  29  20   CR  1.19  1.13   MAGDALENO  9.0  9.0         PATHOLOGY:  Peripheral smear 11/16/17:  FINAL DIAGNOSIS:   Peripheral Blood Morphology-   - Marked leukocytosis with atypical absolute lymphocytosis.   - Mild macrocytic, normochromic anemia with increased rouleaux.   - Mild thrombocytopenia.   - See comment.     COMMENT:   The morphologic findings would be compatible with peripheral blood   involvement by a low-grade lymphoproliferative disorder; correlation   with the currently pending peripheral blood immunophenotyping is   recommended.     Macrocytic anemia is present.  Vitamin B12 and red cell folate levels   are recommended.  Other possible etiologies include alcohol abuse, liver   disease, medications, and hypothyroidism.  No dysplastic changes are   present to suggest a myelodysplastic syndrome.     Some possible causes to consider for patient's thrombocytopenia include   infection, adverse medication reactions, alcohol abuse, nutritional   deficiency, immune-mediated, or peripheral platelet destruction,   consumption or sequestration.         ASSESSMENT/PLAN:  Austin Garza is a 77 year old male with:    1) Leukocytosis: WBC of 61.7K, hemoglobin of 10.4, platelet count of 115K.  His last CBC was in 2010, with normal WBC, so unclear how long these abnormalities have been present, although since he is fairly asymptomatic, I suspect that this is a chronic process.  The differential on the WBC is still pending.  I called the lab, and the blood work was just sent over from [place], so it has not been processed yet.  I called the pathologist, and she did review the smear quickly, which showed marked leukocytosis with atypical absolute  lymphocytosis, with mild macrocytic, normochromic anemia with increased rouleaux, and mild thrombocytopenia.  No dysplastic changes to suggest a myelodysplastic disorder.  The suspicion currently is for a low grade lymphoproliferative disorder, such as CLL.  I had a peripheral flow cytometry sent today, which will provide more information.  I will also have patient undergo bone marrow biopsy as soon as possible, and patient agrees to the procedure.   Uric acid is slightly high at 7.4.  LDH is normal.  There is low suspicion for an acute leukemia, but will await further testing.    -flow cytometry sent today  -schedule bone marrow biopsy tomorrow or next week  -RTC after bone marrow biopsy is done      I spent a total of 60 minutes with the patient, with over >50% of the time in counseling and/or coordination of care.      Breana Perry MD  Hematology/Oncology  [facility]      Again, thank you for allowing me to participate in the care of your patient.        Sincerely,        Breana Perry MD

## 2017-11-16 NOTE — PROGRESS NOTES
SUBJECTIVE:   Austin Garza is a 77 year old male who presents to clinic today for the following health issues    Diabetes Follow-up      Patient is checking blood sugars: rarely.     Diabetic concerns: None     Symptoms of hypoglycemia (low blood sugar): none     Paresthesias (numbness or burning in feet) or sores: Yes sensation of warmth on feet only when in bed at night     Date of last diabetic eye exam: About a year ago,downstairs    Hyperlipidemia Follow-Up      Rate your low fat/cholesterol diet?: fair    Taking statin?  Yes, no muscle aches from statin    Other lipid medications/supplements?:  none    Hypertension Follow-up      Outpatient blood pressures are not being checked.    Low Salt Diet: no added salt        Amount of exercise or physical activity: 3 days/week for an average of greater than 60 minutes    Problems taking medications regularly: No    Medication side effects: none    Diet: low salt, low fat/cholesterol and carbohydrate counting    Feeling more fatigued than previously. Is more of a light sleeper for the past 10 years or so. Not sure if this is age related or not. Does get a little more short of breath with his exercise, took about 6 months off from swimming and then after going back to it noticed he couldn't swim as far as he usually could. No chest pain at all.    Tolerating diabetic medications well without issue. No vision changes, headaches, leg swelling.     Problem list and histories reviewed & adjusted, as indicated.  Additional history: as documented    Patient Active Problem List   Diagnosis     Hearing loss     Dysfunction of eustachian tube     Hypertension goal BP (blood pressure) < 140/90     Diminished Peripheral Pulse     Type 2 diabetes mellitus without complication, without long-term current use of insulin (H)     HYPERLIPIDEMIA LDL GOAL <100     Inflammatory Monoarthritis, Left Hip     Advanced directives, counseling/discussion     Esophageal reflux     Overweight  - BMI >35     BCC (basal cell carcinoma), face     Skin lesion of back     Past Surgical History:   Procedure Laterality Date     APPENDECTOMY  1966     COLONOSCOPY       SURGICAL HISTORY OF -   1985-90    Squamous cell skin cancer resection - back     SURGICAL HISTORY OF -   1998    skin tags resection     SURGICAL HISTORY OF -   2/2001    stress thall. 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 Mother      CANCER Mother      skin     Eye Disorder Mother      glaucoma     Prostate Cancer No family hx of      Cancer - colorectal No family hx of              Reviewed and updated as needed this visit by clinical staffTobacco  Allergies  Meds  Med Hx  Surg Hx  Fam Hx  Soc Hx        ROS:  Constitutional, HEENT, cardiovascular, pulmonary, gi and gu systems are negative, except as otherwise noted.      OBJECTIVE:   /85  Pulse 75  Temp 97.6  F (36.4  C) (Tympanic)  Wt 212 lb 12.8 oz (96.5 kg)  BMI 31.89 kg/m2  Body mass index is 31.89 kg/(m^2).  GENERAL: healthy, alert and no distress  RESP: lungs clear to auscultation - no rales, rhonchi or wheezes  CV: regular rate and rhythm, normal S1 S2, no S3 or S4, no murmur, click or rub, no peripheral edema and peripheral pulses strong  PSYCH: mentation appears normal, affect normal/bright  Diabetic foot exam: normal DP and PT pulses, no trophic changes or ulcerative lesions, normal sensory exam and normal monofilament exam    Diagnostic Test Results:  none      ASSESSMENT/PLAN:     1. Type 2 diabetes mellitus without complication, without long-term current use of insulin (H)  Due for labs, has been tolerating medications well. If elevated, que go up on glimepiride. Otherwise will continue with current regimen.  - HEMOGLOBIN A1C  - Comprehensive metabolic panel (BMP + Alb, Alk Phos, ALT, AST, Total. Bili, TP)  - glimepiride (AMARYL) 1 MG tablet; Take 1 tablet (1 mg) by mouth every morning (before breakfast)  Dispense: 90 tablet; Refill: 3  - metFORMIN (GLUCOPHAGE) 1000 MG tablet; Take 1 tablet (1,000 mg) by mouth 2 times daily (with meals)  Dispense: 180 tablet; Refill: 3  - atorvastatin (LIPITOR) 20 MG tablet; Take 1 tablet (20 mg) by mouth daily  Dispense: 90 tablet; Refill: 3    2. Hypertension goal BP (blood pressure) < 140/90  Well controlled today, diastolic slightly elevated. Will refill medications.   - Albumin Random Urine Quantitative with Creat Ratio  - losartan-hydrochlorothiazide (HYZAAR) 50-12.5 MG per tablet; Take 1 tablet by mouth daily  Dispense: 90 tablet; Refill: 3    3. Fatigue, unspecified type  Gradual and nagging. No other significant associated symptoms. Will obtain labs today, if normal will continue to monitor. No chest pain, and perhaps mild dyspnea with exertion but stable. Will continue to monitor, consider stress testing if dyspnea worsens.   - TSH with free T4 reflex  - Comprehensive metabolic panel (BMP + Alb, Alk Phos, ALT, AST, Total. Bili, TP)  - CBC with platelets differential    4. Screening for diabetic peripheral neuropathy  - FOOT EXAM  NO CHARGE [89804.114]    5. Hyperlipidemia LDL goal <100  Will switch to atorvastatin if well covered by insurance. Otherwise tolerates simvastatin well without issue.   - Lipid panel reflex to direct LDL Fasting  - atorvastatin (LIPITOR) 20 MG tablet; Take 1 tablet (20 mg) by mouth daily  Dispense: 90 tablet; Refill: 3    Patient Instructions   All of your medications were refilled. I would like  for you to switch from simvastatin to atorvastatin (this is a stronger, safer medication). Let me know if this is goo expensive and we can switch you back to simvastatin.    We will check labs today. I will call with these results. We will also check for some causes of fatigue.     Come back in 6 months.    Let me know if you get worsening shortness of breath with activity.       Sandi Eastman MD  [facility]    Addendum:  Labs returns showing WBC 61K, Hgb10.4, and , concerning for leukemia/lymphoma. Anticipate this is more likely to be chronic in nature as patient did not report any B symptoms today. Added on peripheral smear and LDH, uric acid. I did speak with the patient over the phone and communicated these results and my concerns. He has an appointment scheduled to see Dr. Perry with Heme/Onc later today in [place] at 2:45pm.     Sandi Eastman MD  Internal Medicine-Pediatrics

## 2017-11-17 ENCOUNTER — TELEPHONE (OUTPATIENT)
Dept: ONCOLOGY | Facility: CLINIC | Age: 77
End: 2017-11-17

## 2017-11-17 DIAGNOSIS — C91.10 CLL (CHRONIC LYMPHOCYTIC LEUKEMIA) (H): Primary | ICD-10-CM

## 2017-11-17 LAB — COPATH REPORT: NORMAL

## 2017-11-17 NOTE — TELEPHONE ENCOUNTER
Called patient back after receiving message from MD.  Left message for patient, that ability to know if treatment will be needed is not available at this time; will be dependent on what biopsy reveals.  Although treatment may not start immediately, MD did state that Austin will most likely need close f/u with fairly frequent doctor's appointments, labs, and imaging scans. Patient can contact clinic with any additional questions or concerns.  Hollis Lucas RN, BSN, OCN  [facility]  Patient Care Coordinator'

## 2017-11-17 NOTE — TELEPHONE ENCOUNTER
Patient's wife, Marcie, returned schedulers call.  Also has some questions for MD ~ how soon treatment for her  may need to start. Writer will send message to MD to inquire about treatment start time frame as it relates to insurance choices.  Patient is aware that MD or writer will f/u today or next week and is in agreement with the plan.  Hollis Lucas RN, BSN, OCN  [facility]  Patient Care Coordinator'

## 2017-11-18 NOTE — TELEPHONE ENCOUNTER
I spoke with Austin on the phone.      Peripheral flow cytometry shows CD-5 positive kappa-monotypic B-cells, favoring CLL/SLL.      He will be getting a bone marrow biopsy next week on 11/21/17.  I also recommended having a CT c/a/p done to evaluate for lymphadenopathy.      Austin expressed understanding and agrees.  I will ask our schedulers to contact patient to schedule CT c/a/p prior to his next appointment with me.

## 2017-11-20 ENCOUNTER — TELEPHONE (OUTPATIENT)
Dept: PEDIATRICS | Facility: CLINIC | Age: 77
End: 2017-11-20

## 2017-11-20 DIAGNOSIS — F43.23 ADJUSTMENT DISORDER WITH MIXED ANXIETY AND DEPRESSED MOOD: Primary | ICD-10-CM

## 2017-11-20 NOTE — TELEPHONE ENCOUNTER
"Spouse, Marcie, calling to notify that pt is going through tough time with the new dx. Spouse requesting counseling ASAP tp help with the situation. Spouse was in tears when asked, \"how are you doing?\", she says that no one has asked that question since his dx & she is trying to be strong for him. Pt & spouse will benefit from family counseling. Please advise on referral.     Need to contact spouse with referral info ASAP. Thanks.     Spouse can be reached at [phone] / [phone](OK to LM).     Mayte, RN  Triage Nurse            "

## 2017-11-20 NOTE — TELEPHONE ENCOUNTER
Called and spoke with patient and his spouse, they are very interested in therapy as soon as possible. Referral for CBT placed, and scheduling phone number given.     They are interested in seeing Randolph Colón here in [place] if at all possible, I will individually reach out to him to see if he has any availability on his schedule.    Sandi Eastman MD  Internal Medicine-Pediatrics

## 2017-11-21 ENCOUNTER — ANESTHESIA (OUTPATIENT)
Dept: GASTROENTEROLOGY | Facility: CLINIC | Age: 77
End: 2017-11-21
Payer: MEDICARE

## 2017-11-21 ENCOUNTER — HOSPITAL ENCOUNTER (OUTPATIENT)
Facility: CLINIC | Age: 77
Discharge: HOME | End: 2017-11-21
Attending: PATHOLOGY | Admitting: PATHOLOGY
Payer: MEDICARE

## 2017-11-21 ENCOUNTER — SURGERY (OUTPATIENT)
Age: 77
End: 2017-11-21

## 2017-11-21 ENCOUNTER — ANESTHESIA EVENT (OUTPATIENT)
Dept: GASTROENTEROLOGY | Facility: CLINIC | Age: 77
End: 2017-11-21
Payer: MEDICARE

## 2017-11-21 VITALS
OXYGEN SATURATION: 96 % | DIASTOLIC BLOOD PRESSURE: 68 MMHG | SYSTOLIC BLOOD PRESSURE: 129 MMHG | RESPIRATION RATE: 14 BRPM

## 2017-11-21 DIAGNOSIS — C91.10 CLL (CHRONIC LYMPHOCYTIC LEUKEMIA) (H): Primary | ICD-10-CM

## 2017-11-21 LAB
ANISOCYTOSIS BLD QL SMEAR: SLIGHT
BASOPHILS # BLD AUTO: 0 10E9/L (ref 0–0.2)
BASOPHILS NFR BLD AUTO: 0 %
DIFFERENTIAL METHOD BLD: ABNORMAL
ELLIPTOCYTES BLD QL SMEAR: SLIGHT
EOSINOPHIL # BLD AUTO: 0.5 10E9/L (ref 0–0.7)
EOSINOPHIL NFR BLD AUTO: 1 %
ERYTHROCYTE [DISTWIDTH] IN BLOOD BY AUTOMATED COUNT: 15.2 % (ref 10–15)
GLUCOSE BLDC GLUCOMTR-MCNC: 141 MG/DL (ref 70–99)
HCT VFR BLD AUTO: 30 % (ref 40–53)
HGB BLD-MCNC: 9.9 G/DL (ref 13.3–17.7)
LYMPHOCYTES # BLD AUTO: 45.1 10E9/L (ref 0.8–5.3)
LYMPHOCYTES NFR BLD AUTO: 86 %
MCH RBC QN AUTO: 33.8 PG (ref 26.5–33)
MCHC RBC AUTO-ENTMCNC: 33 G/DL (ref 31.5–36.5)
MCV RBC AUTO: 102 FL (ref 78–100)
MONOCYTES # BLD AUTO: 4.2 10E9/L (ref 0–1.3)
MONOCYTES NFR BLD AUTO: 8 %
NEUTROPHILS # BLD AUTO: 2.6 10E9/L (ref 1.6–8.3)
NEUTROPHILS NFR BLD AUTO: 5 %
PLATELET # BLD AUTO: 132 10E9/L (ref 150–450)
PLATELET # BLD EST: ABNORMAL 10*3/UL
RBC # BLD AUTO: 2.93 10E12/L (ref 4.4–5.9)
RETICS # AUTO: 53.3 10E9/L (ref 25–95)
RETICS/RBC NFR AUTO: 1.8 % (ref 0.5–2)
SMUDGE CELLS BLD QL SMEAR: PRESENT
WBC # BLD AUTO: 52.4 10E9/L (ref 4–11)

## 2017-11-21 PROCEDURE — 88280 CHROMOSOME KARYOTYPE STUDY: CPT | Performed by: PATHOLOGY

## 2017-11-21 PROCEDURE — 40000010 ZZH STATISTIC ANES STAT CODE-CRNA PER MINUTE: Performed by: PATHOLOGY

## 2017-11-21 PROCEDURE — 40000948 ZZHCL STATISTIC BONE MARROW ASP TC 85097: Performed by: INTERNAL MEDICINE

## 2017-11-21 PROCEDURE — 88313 SPECIAL STAINS GROUP 2: CPT | Mod: 26 | Performed by: INTERNAL MEDICINE

## 2017-11-21 PROCEDURE — 88182 CELL MARKER STUDY: CPT | Performed by: INTERNAL MEDICINE

## 2017-11-21 PROCEDURE — 36415 COLL VENOUS BLD VENIPUNCTURE: CPT | Performed by: PATHOLOGY

## 2017-11-21 PROCEDURE — 82962 GLUCOSE BLOOD TEST: CPT

## 2017-11-21 PROCEDURE — G0364 BONE MARROW ASPIRATE &BIOPSY: HCPCS | Performed by: INTERNAL MEDICINE

## 2017-11-21 PROCEDURE — 88305 TISSUE EXAM BY PATHOLOGIST: CPT | Performed by: INTERNAL MEDICINE

## 2017-11-21 PROCEDURE — 40000424 ZZHCL STATISTIC BONE MARROW CORE PERF TC 38221: Performed by: INTERNAL MEDICINE

## 2017-11-21 PROCEDURE — 38221 DX BONE MARROW BIOPSIES: CPT | Performed by: INTERNAL MEDICINE

## 2017-11-21 PROCEDURE — 88184 FLOWCYTOMETRY/ TC 1 MARKER: CPT | Performed by: PATHOLOGY

## 2017-11-21 PROCEDURE — 88185 FLOWCYTOMETRY/TC ADD-ON: CPT | Performed by: PATHOLOGY

## 2017-11-21 PROCEDURE — 40001004 ZZHCL STATISTIC FLOW INT 9-15 ABY TC 88188: Performed by: PATHOLOGY

## 2017-11-21 PROCEDURE — 25000128 H RX IP 250 OP 636: Performed by: NURSE ANESTHETIST, CERTIFIED REGISTERED

## 2017-11-21 PROCEDURE — 85097 BONE MARROW INTERPRETATION: CPT | Performed by: INTERNAL MEDICINE

## 2017-11-21 PROCEDURE — 88305 TISSUE EXAM BY PATHOLOGIST: CPT | Mod: 26 | Performed by: INTERNAL MEDICINE

## 2017-11-21 PROCEDURE — 37000008 ZZH ANESTHESIA TECHNICAL FEE, 1ST 30 MIN: Performed by: PATHOLOGY

## 2017-11-21 PROCEDURE — 88271 CYTOGENETICS DNA PROBE: CPT | Performed by: PATHOLOGY

## 2017-11-21 PROCEDURE — 88313 SPECIAL STAINS GROUP 2: CPT | Performed by: INTERNAL MEDICINE

## 2017-11-21 PROCEDURE — 88237 TISSUE CULTURE BONE MARROW: CPT | Performed by: PATHOLOGY

## 2017-11-21 PROCEDURE — 00000058 ZZHCL STATISTIC BONE MARROW ASP PERF TC 38220: Performed by: INTERNAL MEDICINE

## 2017-11-21 PROCEDURE — 25000125 ZZHC RX 250: Performed by: NURSE ANESTHETIST, CERTIFIED REGISTERED

## 2017-11-21 PROCEDURE — 40000847 ZZHCL STATISTIC MORPHOLOGY W/INTERP HISTOLOGY TC 85060: Performed by: INTERNAL MEDICINE

## 2017-11-21 PROCEDURE — 85025 COMPLETE CBC W/AUTO DIFF WBC: CPT | Performed by: PATHOLOGY

## 2017-11-21 PROCEDURE — 40000795 ZZHCL STATISTIC DNA PROCESS AND HOLD: Performed by: PATHOLOGY

## 2017-11-21 PROCEDURE — 88341 IMHCHEM/IMCYTCHM EA ADD ANTB: CPT | Performed by: INTERNAL MEDICINE

## 2017-11-21 PROCEDURE — G0364 BONE MARROW ASPIRATE &BIOPSY: HCPCS

## 2017-11-21 PROCEDURE — 88342 IMHCHEM/IMCYTCHM 1ST ANTB: CPT | Mod: 26 | Performed by: INTERNAL MEDICINE

## 2017-11-21 PROCEDURE — 88311 DECALCIFY TISSUE: CPT | Performed by: INTERNAL MEDICINE

## 2017-11-21 PROCEDURE — 88275 CYTOGENETICS 100-300: CPT | Performed by: PATHOLOGY

## 2017-11-21 PROCEDURE — 37000009 ZZH ANESTHESIA TECHNICAL FEE, EACH ADDTL 15 MIN: Performed by: PATHOLOGY

## 2017-11-21 PROCEDURE — 88311 DECALCIFY TISSUE: CPT | Mod: 26 | Performed by: INTERNAL MEDICINE

## 2017-11-21 PROCEDURE — 85060 BLOOD SMEAR INTERPRETATION: CPT | Performed by: INTERNAL MEDICINE

## 2017-11-21 PROCEDURE — 88264 CHROMOSOME ANALYSIS 20-25: CPT | Performed by: PATHOLOGY

## 2017-11-21 PROCEDURE — 85045 AUTOMATED RETICULOCYTE COUNT: CPT | Performed by: PATHOLOGY

## 2017-11-21 PROCEDURE — 88342 IMHCHEM/IMCYTCHM 1ST ANTB: CPT | Performed by: INTERNAL MEDICINE

## 2017-11-21 PROCEDURE — 38221 DX BONE MARROW BIOPSIES: CPT | Performed by: PATHOLOGY

## 2017-11-21 PROCEDURE — 00000159 ZZHCL STATISTIC H-SEND OUTS PREP: Performed by: INTERNAL MEDICINE

## 2017-11-21 RX ORDER — SODIUM CHLORIDE, SODIUM LACTATE, POTASSIUM CHLORIDE, CALCIUM CHLORIDE 600; 310; 30; 20 MG/100ML; MG/100ML; MG/100ML; MG/100ML
INJECTION, SOLUTION INTRAVENOUS CONTINUOUS PRN
Status: DISCONTINUED | OUTPATIENT
Start: 2017-11-21 | End: 2017-11-21

## 2017-11-21 RX ORDER — PROPOFOL 10 MG/ML
INJECTION, EMULSION INTRAVENOUS PRN
Status: DISCONTINUED | OUTPATIENT
Start: 2017-11-21 | End: 2017-11-21

## 2017-11-21 RX ORDER — FENTANYL CITRATE 50 UG/ML
INJECTION, SOLUTION INTRAMUSCULAR; INTRAVENOUS PRN
Status: DISCONTINUED | OUTPATIENT
Start: 2017-11-21 | End: 2017-11-21

## 2017-11-21 RX ORDER — LIDOCAINE HYDROCHLORIDE 20 MG/ML
INJECTION, SOLUTION INFILTRATION; PERINEURAL PRN
Status: DISCONTINUED | OUTPATIENT
Start: 2017-11-21 | End: 2017-11-21

## 2017-11-21 RX ORDER — ONDANSETRON 2 MG/ML
INJECTION INTRAMUSCULAR; INTRAVENOUS PRN
Status: DISCONTINUED | OUTPATIENT
Start: 2017-11-21 | End: 2017-11-21

## 2017-11-21 RX ORDER — LIDOCAINE HYDROCHLORIDE 10 MG/ML
8-10 INJECTION, SOLUTION EPIDURAL; INFILTRATION; INTRACAUDAL; PERINEURAL
Status: DISCONTINUED | OUTPATIENT
Start: 2017-11-21 | End: 2017-11-21 | Stop reason: HOSPADM

## 2017-11-21 RX ORDER — PROPOFOL 10 MG/ML
INJECTION, EMULSION INTRAVENOUS CONTINUOUS PRN
Status: DISCONTINUED | OUTPATIENT
Start: 2017-11-21 | End: 2017-11-21

## 2017-11-21 RX ADMIN — PROPOFOL 100 MCG/KG/MIN: 10 INJECTION, EMULSION INTRAVENOUS at 09:21

## 2017-11-21 RX ADMIN — FENTANYL CITRATE 50 MCG: 50 INJECTION, SOLUTION INTRAMUSCULAR; INTRAVENOUS at 09:32

## 2017-11-21 RX ADMIN — LIDOCAINE HYDROCHLORIDE 40 MG: 20 INJECTION, SOLUTION INFILTRATION; PERINEURAL at 09:19

## 2017-11-21 RX ADMIN — MIDAZOLAM HYDROCHLORIDE 1 MG: 1 INJECTION, SOLUTION INTRAMUSCULAR; INTRAVENOUS at 09:15

## 2017-11-21 RX ADMIN — DEXMEDETOMIDINE HYDROCHLORIDE 8 MCG: 100 INJECTION, SOLUTION INTRAVENOUS at 09:15

## 2017-11-21 RX ADMIN — PROPOFOL 10 MG: 10 INJECTION, EMULSION INTRAVENOUS at 09:32

## 2017-11-21 RX ADMIN — DEXMEDETOMIDINE HYDROCHLORIDE 4 MCG: 100 INJECTION, SOLUTION INTRAVENOUS at 09:21

## 2017-11-21 RX ADMIN — ONDANSETRON 4 MG: 2 INJECTION INTRAMUSCULAR; INTRAVENOUS at 09:31

## 2017-11-21 RX ADMIN — MIDAZOLAM HYDROCHLORIDE 1 MG: 1 INJECTION, SOLUTION INTRAMUSCULAR; INTRAVENOUS at 09:20

## 2017-11-21 RX ADMIN — PROPOFOL 10 MG: 10 INJECTION, EMULSION INTRAVENOUS at 09:28

## 2017-11-21 RX ADMIN — FENTANYL CITRATE 50 MCG: 50 INJECTION, SOLUTION INTRAMUSCULAR; INTRAVENOUS at 09:15

## 2017-11-21 RX ADMIN — SODIUM CHLORIDE, POTASSIUM CHLORIDE, SODIUM LACTATE AND CALCIUM CHLORIDE: 600; 310; 30; 20 INJECTION, SOLUTION INTRAVENOUS at 09:15

## 2017-11-21 NOTE — ANESTHESIA PREPROCEDURE EVALUATION
Anesthesia Evaluation     .             ROS/MED HX    ENT/Pulmonary:      (-) sleep apnea   Neurologic:       Cardiovascular:     (+) Dyslipidemia, hypertension----. : . . . :. .       METS/Exercise Tolerance:     Hematologic:         Musculoskeletal:         GI/Hepatic:     (+) GERD hiatal hernia, Other GI/Hepatic Diverticulosis;      Renal/Genitourinary:         Endo:     (+) type II DM Not using insulin .      Psychiatric:         Infectious Disease:         Malignancy:         Other:                     Physical Exam  Normal systems: cardiovascular, pulmonary and dental    Airway   Mallampati: II  TM distance: >3 FB  Neck ROM: full    Dental     Cardiovascular       Pulmonary                     Anesthesia Plan      History & Physical Review  History and physical reviewed and following examination; no interval change.    ASA Status:  2 .        Plan for MAC   PONV prophylaxis:  Ondansetron (or other 5HT-3)       Postoperative Care      Consents  Anesthetic plan, risks, benefits and alternatives discussed with:  Patient..                          .

## 2017-11-21 NOTE — PROCEDURES
Bone Marrow Biopsy Procedure Note  November 21, 2017  /86  Resp 18  SpO2 97%     DIAGNOSIS: Chronic lymphocytic leukemia    PROCEDURE: Unilateral Bone Marrow Biopsy and Unilateral Aspirate    Patient s identification was positively verified by verbal identification. Informed consent was obtained. The patient received MAC sedation. He was placed in the prone position and prepped and draped in a sterile manner. Approximately 8 cc of 1% Lidocaine was used over the left posterior iliac spine. Following this a 3 mm incision was made. Trephine bone marrow core(s) was (were) obtained from the [facility]. Bone marrow aspirates were obtained from the [facility]. Aspirates were sent for morphology, immunophenotyping, cytogenetics and molecular diagnostics (process and hold). A total of approximately 12 ml of marrow was aspirated. Following this procedure a sterile dressing was applied to the bone marrow biopsy site(s). The patient was placed in the supine position to maintain pressure on the biopsy site. Post-procedure wound care instructions were given.     Procedure performed by: Ita Tilley MD

## 2017-11-21 NOTE — ANESTHESIA CARE TRANSFER NOTE
Patient: Austin Garza    Procedure(s):  BONE MARROW BIOPSY    Diagnosis: leukocytosis, unspecified type   Diagnosis Additional Information: No value filed.    Anesthesia Type:   MAC     Note:  Airway :Face Mask  Patient transferred to:PACU  Comments: Pt exhibits spont resps, all monitors and alarms on in pacu, report given to RN, vss.Handoff Report: Identifed the Patient, Identified the Reponsible Provider, Reviewed the pertinent medical history, Discussed the surgical course, Reviewed Intra-OP anesthesia mangement and issues during anesthesia, Set expectations for post-procedure period and Allowed opportunity for questions and acknowledgement of understanding      Vitals: (Last set prior to Anesthesia Care Transfer)    CRNA VITALS  11/21/2017 0911 - 11/21/2017 0946      11/21/2017             Resp Rate (set): 10                Electronically Signed By: GISELL Dahl CRNA  November 21, 2017  9:46 AM

## 2017-11-22 LAB — COPATH REPORT: NORMAL

## 2017-11-24 LAB
COPATH REPORT: NORMAL
DIFFERENTIAL METHOD BLD: ABNORMAL
ERYTHROCYTE [DISTWIDTH] IN BLOOD BY AUTOMATED COUNT: 14.9 % (ref 10–15)
HCT VFR BLD AUTO: 30.9 % (ref 40–53)
HGB BLD-MCNC: 10.4 G/DL (ref 13.3–17.7)
MCH RBC QN AUTO: 35.6 PG (ref 26.5–33)
MCHC RBC AUTO-ENTMCNC: 33.7 G/DL (ref 31.5–36.5)
MCV RBC AUTO: 106 FL (ref 78–100)
PLATELET # BLD AUTO: 115 10E9/L (ref 150–450)
RBC # BLD AUTO: 2.92 10E12/L (ref 4.4–5.9)
WBC # BLD AUTO: 61.7 10E9/L (ref 4–11)

## 2017-11-28 LAB — COPATH REPORT: NORMAL

## 2017-11-28 NOTE — TELEPHONE ENCOUNTER
Schedulers reached pt today about addition of CT scan for 11/29/17 and will follow up with Dr Perry 12/1/17.    Shirin Marin, RN, BSN

## 2017-11-29 ENCOUNTER — HOSPITAL ENCOUNTER (OUTPATIENT)
Dept: CT IMAGING | Facility: CLINIC | Age: 77
Discharge: HOME | End: 2017-11-29
Attending: INTERNAL MEDICINE | Admitting: INTERNAL MEDICINE
Payer: MEDICARE

## 2017-11-29 ENCOUNTER — OFFICE VISIT (OUTPATIENT)
Dept: OTHER | Facility: CLINIC | Age: 77
End: 2017-11-29
Attending: SURGERY
Payer: COMMERCIAL

## 2017-11-29 ENCOUNTER — TELEPHONE (OUTPATIENT)
Dept: OTHER | Facility: CLINIC | Age: 77
End: 2017-11-29

## 2017-11-29 ENCOUNTER — TELEPHONE (OUTPATIENT)
Dept: ONCOLOGY | Facility: CLINIC | Age: 77
End: 2017-11-29

## 2017-11-29 VITALS
HEIGHT: 69 IN | SYSTOLIC BLOOD PRESSURE: 123 MMHG | BODY MASS INDEX: 31.1 KG/M2 | HEART RATE: 96 BPM | DIASTOLIC BLOOD PRESSURE: 75 MMHG | WEIGHT: 210 LBS

## 2017-11-29 DIAGNOSIS — C91.10 CLL (CHRONIC LYMPHOCYTIC LEUKEMIA) (H): ICD-10-CM

## 2017-11-29 DIAGNOSIS — I71.40 ABDOMINAL AORTIC ANEURYSM (AAA) WITHOUT RUPTURE (H): Primary | ICD-10-CM

## 2017-11-29 LAB — RADIOLOGIST FLAGS: ABNORMAL

## 2017-11-29 PROCEDURE — 99204 OFFICE O/P NEW MOD 45 MIN: CPT | Mod: ZP | Performed by: SURGERY

## 2017-11-29 PROCEDURE — 99211 OFF/OP EST MAY X REQ PHY/QHP: CPT

## 2017-11-29 PROCEDURE — 25000128 H RX IP 250 OP 636: Performed by: RADIOLOGY

## 2017-11-29 PROCEDURE — 74177 CT ABD & PELVIS W/CONTRAST: CPT

## 2017-11-29 PROCEDURE — 71260 CT THORAX DX C+: CPT

## 2017-11-29 RX ORDER — IOPAMIDOL 755 MG/ML
500 INJECTION, SOLUTION INTRAVASCULAR ONCE
Status: COMPLETED | OUTPATIENT
Start: 2017-11-29 | End: 2017-11-29

## 2017-11-29 RX ADMIN — SODIUM CHLORIDE 65 ML: 9 INJECTION, SOLUTION INTRAVENOUS at 08:08

## 2017-11-29 RX ADMIN — IOPAMIDOL 100 ML: 755 INJECTION, SOLUTION INTRAVENOUS at 08:08

## 2017-11-29 NOTE — MR AVS SNAPSHOT
After Visit Summary   11/29/2017    Austin Garza    MRN: [id number]           Patient Information     Date Of Birth          1940        Visit Information        Provider Department      11/29/2017 2:45 PM Solis Thomas MD [facility] [place]      Today's Diagnoses     Abdominal aortic aneurysm (AAA) without rupture (H)    -  1       Follow-ups after your visit        Your next 10 appointments already scheduled     Dec 01, 2017 10:00 AM CST   Return Visit with Breana Perry MD   [facility] ([facility])    [facility] [place]  [address]  [place] [id number] [phone]            Dec 19, 2017  3:00 PM CST   New Visit with Justino Mena [facility]    [address]  [facility] [address] [phone]              Who to contact     If you have questions or need follow up information about today's clinic visit or your schedule please contact [facility] directly at [phone].  Normal or non-critical lab and imaging results will be communicated to you by MyChart, letter or phone within 4 business days after the clinic has received the results. If you do not hear from us within 7 days, please contact the clinic through MyChart or phone. If you have a critical or abnormal lab result, we will notify you by phone as soon as possible.  Submit refill requests through [facility] or call your pharmacy and they will forward the refill request to us. Please allow 3 business days for your refill to be completed.          Additional Information About Your Visit        MyChart Information     [employer] gives you secure access to your electronic health record. If you see a primary care provider, you can also send messages to your care team and make appointments. If you have  "questions, please call your primary care clinic.  If you do not have a primary care provider, please call [phone] and they will assist you.        Care EveryWhere ID     This is your Care EveryWhere ID. This could be used by other organizations to access your [facility] medical records  [id number]        Your Vitals Were     Pulse Height BMI (Body Mass Index)             96 5' 9\" (1.753 m) 31.01 kg/m2          Blood Pressure from Last 3 Encounters:   11/29/17 123/75   11/21/17 129/68   11/16/17 130/89    Weight from Last 3 Encounters:   11/29/17 210 lb (95.3 kg)   11/16/17 212 lb (96.2 kg)   11/16/17 212 lb 12.8 oz (96.5 kg)              Today, you had the following     No orders found for display         Today's Medication Changes          These changes are accurate as of: 11/29/17  3:22 PM.  If you have any questions, ask your nurse or doctor.               Stop taking these medicines if you haven't already. Please contact your care team if you have questions.     fluticasone 50 MCG/ACT spray   Commonly known as:  FLONASE   Stopped by:  Solis Thomas MD                    Primary Care Provider Office Phone # Fax #    Sandi Eastman [phone] [address] [place] [address]        Equal Access to Services     [facility]: Hadii gertrude pachecoo Solilian, waaxda luqadaha, qaybta kaalmarand rodriguez, mina early. So [facility] [phone].    ATENCIÓN: Si habla español, tiene a edward disposición servicios gratuitos de asistencia lingüística. Llame al [phone].    We comply with applicable federal civil rights laws and [place] laws. We do not discriminate on the basis of race, color, national origin, age, disability, sex, sexual orientation, or gender identity.            Thank you!     Thank you for choosing [facility] [place]  for your care. Our goal is always to provide you with excellent care. Hearing back from our " patients is one way we can continue to improve our services. Please take a few minutes to complete the written survey that you may receive in the mail after your visit with us. Thank you!             Your Updated Medication List - Protect others around you: Learn how to safely use, store and throw away your medicines at www.disposemymeds.org.          This list is accurate as of: 11/29/17  3:22 PM.  Always use your most recent med list.                   Brand Name Dispense Instructions for use Diagnosis    albuterol 108 (90 BASE) MCG/ACT Inhaler    PROAIR HFA/PROVENTIL HFA/VENTOLIN HFA    1 Inhaler    Inhale 2 puffs into the lungs every 6 hours as needed for shortness of breath / dyspnea or wheezing    Acute bronchospasm       aspirin 81 MG tablet     100    ONE DAILY    Type II or unspecified type diabetes mellitus without mention of complication, not stated as uncontrolled, Unspecified essential hypertension       atorvastatin 20 MG tablet    LIPITOR    90 tablet    Take 1 tablet (20 mg) by mouth daily    Type 2 diabetes mellitus without complication, without long-term current use of insulin (H), Hyperlipidemia LDL goal <100       blood glucose monitoring meter device kit     1 kit    Use to test blood sugars 2-3 times daily as directed. Brand per insurance formulary    Type II or unspecified type diabetes mellitus without mention of complication, not stated as uncontrolled       blood glucose monitoring test strip    ONETOUCH ULTRA    100 each    Test 2-3 times daily as directed, brand per insurance formulary.    Type 2 diabetes mellitus without complication, without long-term current use of insulin (H)       glimepiride 1 MG tablet    AMARYL    90 tablet    Take 1 tablet (1 mg) by mouth every morning (before breakfast)    Type 2 diabetes mellitus without complication, without long-term current use of insulin (H)       losartan-hydrochlorothiazide 50-12.5 MG per tablet    HYZAAR    90 tablet    Take 1 tablet by  mouth daily    Hypertension goal BP (blood pressure) < 140/90       metFORMIN 1000 MG tablet    GLUCOPHAGE    180 tablet    Take 1 tablet (1,000 mg) by mouth 2 times daily (with meals)    Type 2 diabetes mellitus without complication, without long-term current use of insulin (H)       Multi-vitamin Tabs tablet   Generic drug:  multivitamin, therapeutic with minerals      1 po qd

## 2017-11-29 NOTE — PROGRESS NOTES
78 y/o male with CLL found to have a large AAA 7.6cm on work up.  He is asymptomatic.  No history of claudication or rest pain.  I have reviewed the CTA with the patient and his wife and discussed options of therapy in detail as well as risks and goals.  I believe we should intervene within the next week.  Will send his scan out for evaluation of possibiltiy of endovascular repair.  My concern is the short infrarenal neck and atheromatous debris within the lumen.  Face to face time 45 minutes greater than 50% in consultation.

## 2017-11-29 NOTE — LETTER
[facility] [place] [address]  [place] [address]  Phone: [phone]  Fax: [phone]      2017    Re: Austin CHEEMA Greg - 1940    76 y/o male with CLL found to have a large AAA 7.6cm on work up.  He is asymptomatic.  No history of claudication or rest pain.  I have reviewed the CTA with the patient and his wife and discussed options of therapy in detail as well as risks and goals.  I believe we should intervene within the next week.  Will send his scan out for evaluation of possibiltiy of endovascular repair. My concern is the short infrarenal neck and atheromatous debris within the lumen.    Solis Thomas MD

## 2017-11-29 NOTE — TELEPHONE ENCOUNTER
I was able to reach Austin by phone this morning.  He underwent CT c/a/p for staging for his newly diagnosed CLL this morning.  There was an incidental finding of an infrarenal abdominal aortic aneurysm measuring up to 7.6 cm.  Austin is completely asymptomatic at this time.    I placed a referral to vascular surgery ASAP, and he was able to get an appointment this afternoon.  I will still plan to see Austin in clinic this Friday as already scheduled to further discuss his new CLL diagnosis.

## 2017-11-29 NOTE — TELEPHONE ENCOUNTER
Pt referred to [facility] by  for AAA.    Pt had CT abd/pelvis 11/29/17 with incidental finding of 7.6cm infrarenal AAA.    Pt needs to be scheduled for a consult with Vascular Surgery.  Will route to scheduling to coordinate an appointment ASAP.    Radha Castro RN BSN

## 2017-11-29 NOTE — NURSING NOTE
"Chief Complaint   Patient presents with     Consult     New pt referred by  for 7.6cm AAA, incidental finding on CT today (in EPIC       Initial /75 (BP Location: Left arm, Patient Position: Chair, Cuff Size: Adult Regular)  Pulse 96  Ht 5' 9\" (1.753 m)  Wt 210 lb (95.3 kg)  BMI 31.01 kg/m2 Estimated body mass index is 31.01 kg/(m^2) as calculated from the following:    Height as of this encounter: 5' 9\" (1.753 m).    Weight as of this encounter: 210 lb (95.3 kg).  Medication Reconciliation: complete    Face to Face time 8 minutes  Kimberley Luna CMA      "

## 2017-11-30 ENCOUNTER — TELEPHONE (OUTPATIENT)
Dept: OTHER | Facility: CLINIC | Age: 77
End: 2017-11-30

## 2017-11-30 NOTE — PROGRESS NOTES
If patient needs to be scheduled for an urgent pre-operative exam, please feel free to add him on to my schedule. This can be done during a same-day block, over lunch or at the end of the day early next week. Fine to use a catch-up spot if absolutely needed. Thanks!    Sandi Eastman MD  Internal Medicine-Pediatrics

## 2017-11-30 NOTE — TELEPHONE ENCOUNTER
Patient Education    Procedure: EVAR  Diagnosis: 7.6cm AAA  Anticoagulation Instruction: NA  Pre-Operative Physical Exam: You need to have a pre-op physical exam within 30 days of your procedure. Your procedure may be cancelled if you do not have a current History and Physical. Call your PCP's office to schedule.  Allergies:  Updated in Epic  Bowel Prep: instructed on mag citrate  Post Procedure Education: [facility] post-procedure fact sheet reviewed with patient.    Learner(s):patient  Method: Listening, Reading  Barriers to Learning:No Barrier  Outcome: Patient did verbalize understanding of above education.    Once surgery is scheduled, I will email the paperwork to pt.    Radha Castro, JAMESONN, RN

## 2017-12-01 ENCOUNTER — ONCOLOGY VISIT (OUTPATIENT)
Dept: ONCOLOGY | Facility: CLINIC | Age: 77
End: 2017-12-01
Attending: INTERNAL MEDICINE
Payer: COMMERCIAL

## 2017-12-01 ENCOUNTER — OFFICE VISIT (OUTPATIENT)
Dept: PEDIATRICS | Facility: CLINIC | Age: 77
End: 2017-12-01
Payer: COMMERCIAL

## 2017-12-01 VITALS
TEMPERATURE: 97.7 F | HEIGHT: 68 IN | HEART RATE: 96 BPM | WEIGHT: 213.9 LBS | BODY MASS INDEX: 32.42 KG/M2 | SYSTOLIC BLOOD PRESSURE: 130 MMHG | DIASTOLIC BLOOD PRESSURE: 68 MMHG | RESPIRATION RATE: 20 BRPM

## 2017-12-01 VITALS
RESPIRATION RATE: 18 BRPM | TEMPERATURE: 98 F | DIASTOLIC BLOOD PRESSURE: 78 MMHG | WEIGHT: 215 LBS | BODY MASS INDEX: 31.75 KG/M2 | SYSTOLIC BLOOD PRESSURE: 122 MMHG | OXYGEN SATURATION: 97 % | HEART RATE: 72 BPM

## 2017-12-01 DIAGNOSIS — C91.10 CLL (CHRONIC LYMPHOCYTIC LEUKEMIA) (H): Primary | ICD-10-CM

## 2017-12-01 DIAGNOSIS — E11.9 TYPE 2 DIABETES MELLITUS WITHOUT COMPLICATION, WITHOUT LONG-TERM CURRENT USE OF INSULIN (H): ICD-10-CM

## 2017-12-01 DIAGNOSIS — Z01.818 PREOP GENERAL PHYSICAL EXAM: Primary | ICD-10-CM

## 2017-12-01 DIAGNOSIS — I71.40 ABDOMINAL AORTIC ANEURYSM (AAA) WITHOUT RUPTURE (H): ICD-10-CM

## 2017-12-01 DIAGNOSIS — C91.10 CLL (CHRONIC LYMPHOCYTIC LEUKEMIA) (H): ICD-10-CM

## 2017-12-01 DIAGNOSIS — I10 HYPERTENSION GOAL BP (BLOOD PRESSURE) < 140/90: ICD-10-CM

## 2017-12-01 LAB
COPATH REPORT: NORMAL
COPATH REPORT: NORMAL

## 2017-12-01 PROCEDURE — 99215 OFFICE O/P EST HI 40 MIN: CPT | Performed by: INTERNAL MEDICINE

## 2017-12-01 PROCEDURE — 99211 OFF/OP EST MAY X REQ PHY/QHP: CPT

## 2017-12-01 PROCEDURE — 93000 ELECTROCARDIOGRAM COMPLETE: CPT | Performed by: INTERNAL MEDICINE

## 2017-12-01 ASSESSMENT — PAIN SCALES - GENERAL: PAINLEVEL: NO PAIN (0)

## 2017-12-01 NOTE — MR AVS SNAPSHOT
After Visit Summary   12/1/2017    Austin Garza    MRN: [id number]           Patient Information     Date Of Birth          1940        Visit Information        Provider Department      12/1/2017 10:00 AM Breana Perry MD [facility]        Today's Diagnoses     CLL (chronic lymphocytic leukemia) (H)    -  1      Care Instructions    -return to clinic on 1/4/18 with labs at the [facility]          Follow-ups after your visit        Your next 10 appointments already scheduled     Dec 01, 2017  1:40 PM CST   Pre-Op physical with Morgan Ochoa MD   [facility]    [address]  [address]  [facility] [address]   [phone]            Dec 04, 2017  7:15 AM CST   [facility] Main OR with Milton Cazares MD, Solis Thomas MD   [facility] ([facility])    [facility] ([facility])   [phone]            Dec 04, 2017   Procedure with Milton Cazares MD   [facility] PeriOP Services (--)    [address] [place] [address], [address]  [facility] [address]   [phone]            Dec 04, 2017  7:30 AM CST   (Arrive by 7:15 AM)   IR ABDOMINAL ENDOVASCULAR STENT GRAFT with [id number]   [facility] Interventional Radiology ([facility])    [address] [address]   [phone]           1. You will need to have had a history and physical exam within 7 days of the procedure. 2. Laboratory test are to be obtained by your doctor prior to the exam (CBCP, INR and PTT) 3. Someone will need to drive you to and from the hospital. 4. If you are or may be pregnant, contact your doctor or a Radiology nurse prior to the day of the exam. 5. If you have diabetes, check with your doctor or a Radiology nurse to see if your insulin needs to be adjusted for the exam. 6. If you are taking Coumadin  (to thin you blood) please contact your doctor or a Radiology nurse at least 3 days before the exam for special instructions. 7. The day before your exam you may eat your regular diet and are encouraged to drink at least 2 quarts of clear liquids. Drink no alcoholic beverages for 24 hours prior to the exam. 8. Do not eat any solid food or milk products for 6 hours prior to the exam. You may drink clear liquids until 2 hours prior to the exam. Clear liquids include the following: water, Jell-O, clear broth, apple juice or any noncarbonated drink that you can see through (no pop!) 9. The morning of the exam you may brush your teeth and take medications as directed with a sip of water. 10. Tell the Radiology nurse if you have any allergies.            Dec 19, 2017  3:00 PM CST   New Visit with Justino Mena [facility] ([facility])    [address] [address]   [phone]            Jan 04, 2018  9:45 AM CST   Return Visit with Breana Perry MD   [facility] ([facility])    [facility]  [address]  [address]  [facility] [address] [phone]              Future tests that were ordered for you today     Open Future Orders        Priority Expected Expires Ordered    CBC with platelets differential Routine 1/4/2018 12/1/2018 12/1/2017    Comprehensive metabolic panel Routine 1/4/2018 12/1/2018 12/1/2017    Lactate Dehydrogenase Routine 1/4/2018 12/1/2018 12/1/2017    Uric acid Routine 1/4/2018 12/1/2018 12/1/2017    IR Abdominal Endovascular Stent Graft Routine  12/1/2018 12/1/2017            Who to contact     If you have questions or need follow up information about today's clinic visit or your schedule please contact [facility] directly at [phone].  Normal or non-critical lab and imaging results will be communicated to you by MyChart, letter or phone  within 4 business days after the clinic has received the results. If you do not hear from us within 7 days, please contact the clinic through [employer] or phone. If you have a critical or abnormal lab result, we will notify you by phone as soon as possible.  Submit refill requests through [employer] or call your pharmacy and they will forward the refill request to us. Please allow 3 business days for your refill to be completed.          Additional Information About Your Visit        [employer] Information     [employer] gives you secure access to your electronic health record. If you see a primary care provider, you can also send messages to your care team and make appointments. If you have questions, please call your primary care clinic.  If you do not have a primary care provider, please call [phone] and they will assist you.        Care EveryWhere ID     This is your Care EveryWhere ID. This could be used by other organizations to access your [place] medical records  [id number]        Your Vitals Were     Pulse Temperature Respirations Pulse Oximetry BMI (Body Mass Index)       72 98  F (36.7  C) 18 97% 31.75 kg/m2        Blood Pressure from Last 3 Encounters:   12/01/17 122/78   11/29/17 123/75   11/21/17 129/68    Weight from Last 3 Encounters:   12/01/17 97.5 kg (215 lb)   11/29/17 95.3 kg (210 lb)   11/16/17 96.2 kg (212 lb)               Primary Care Provider Office Phone # Fax #    Sandi Eastman [phone] [address] [place] [address]        Equal Access to Services     [facility] AH: Hadii aad ku hadasho Soomaali, waaxda luqadaha, qaybta kaalmada adeegyada, mina early. So [facility] [phone].    ATENCIÓN: Si habla español, tiene a edward disposición servicios gratuitos de asistencia lingüística. Llame al [phone].    We comply with applicable federal civil rights laws and [place] laws. We do not discriminate on the basis of race, color,  national origin, age, disability, sex, sexual orientation, or gender identity.            Thank you!     Thank you for choosing [facility]  for your care. Our goal is always to provide you with excellent care. Hearing back from our patients is one way we can continue to improve our services. Please take a few minutes to complete the written survey that you may receive in the mail after your visit with us. Thank you!             Your Updated Medication List - Protect others around you: Learn how to safely use, store and throw away your medicines at www.disposemymeds.org.          This list is accurate as of: 12/1/17 10:54 AM.  Always use your most recent med list.                   Brand Name Dispense Instructions for use Diagnosis    albuterol 108 (90 BASE) MCG/ACT Inhaler    PROAIR HFA/PROVENTIL HFA/VENTOLIN HFA    1 Inhaler    Inhale 2 puffs into the lungs every 6 hours as needed for shortness of breath / dyspnea or wheezing    Acute bronchospasm       aspirin 81 MG tablet     100    ONE DAILY    Type II or unspecified type diabetes mellitus without mention of complication, not stated as uncontrolled, Unspecified essential hypertension       atorvastatin 20 MG tablet    LIPITOR    90 tablet    Take 1 tablet (20 mg) by mouth daily    Type 2 diabetes mellitus without complication, without long-term current use of insulin (H), Hyperlipidemia LDL goal <100       blood glucose monitoring meter device kit     1 kit    Use to test blood sugars 2-3 times daily as directed. Brand per insurance formulary    Type II or unspecified type diabetes mellitus without mention of complication, not stated as uncontrolled       blood glucose monitoring test strip    ONETOUCH ULTRA    100 each    Test 2-3 times daily as directed, brand per insurance formulary.    Type 2 diabetes mellitus without complication, without long-term current use of insulin (H)       glimepiride 1 MG tablet    AMARYL    90 tablet    Take 1 tablet  (1 mg) by mouth every morning (before breakfast)    Type 2 diabetes mellitus without complication, without long-term current use of insulin (H)       losartan-hydrochlorothiazide 50-12.5 MG per tablet    HYZAAR    90 tablet    Take 1 tablet by mouth daily    Hypertension goal BP (blood pressure) < 140/90       metFORMIN 1000 MG tablet    GLUCOPHAGE    180 tablet    Take 1 tablet (1,000 mg) by mouth 2 times daily (with meals)    Type 2 diabetes mellitus without complication, without long-term current use of insulin (H)       Multi-vitamin Tabs tablet   Generic drug:  multivitamin, therapeutic with minerals      1 po qd

## 2017-12-01 NOTE — LETTER
12/1/2017         RE: Austin Garza  [address] [facility] [id number]        Dear Colleague,    Thank you for referring your patient, Austin Garza, to the [facility]. Please see a copy of my visit note below.    [facility]    Hematology/Oncology Established Patient Note      Today's Date: 12/01/17    Reason for Follow-up: CLL      HISTORY OF PRESENT ILLNESS: Austin Garza is a 77 year old male with PMHx of DMII, HTN who presented with leukocytosis.  In November 2017, he was found to have elevated WBC of 61.7K, hemoglobin of 10.4, and platelet of 115K.  There were no other CBC results available between 2010 and 2017.  Prior to 2010, he had normal CBC, with normal to mild anemia, and mild thrombocytopenia.   He was asymptomatic.    He underwent bone marrow biopsy on 11/21/17, which was consistent with chronic lymphocytic leukemia/small lymphocytic lymphoma, with 13% ringed sideroblasts identified.  The presence of increased numbers of ringed sideroblasts raises the possibility of an associated myelodysplastic syndrome.  Dysplastic changes are not identified though.  Many cases exhibiting ringed sideroblasts are metabolic origin, without significant risk of progression to acute leukemia, and these typically do not show dysplastic morphology as is the case with this specimen.  15% ringed sideroblasts are required for a diagnosis for RARS, which this specimen does not meet.  Flow cytometry is also consistent with CLL/SLL.  Cytogenetics are still pending.    CT scan on 11/29/17 shows mildly enlarged left axillary lymph node and periaortic lymph nodes in the retroperitoneum of the abdomen.  Spleen is also enlarged.      INTERIM HISTORY: Austin is here for follow-up with his wife today.  On his staging CT scan for CLL, he was incidentally found to have a large infrarenal abdominal aortic aneurysm, measuring 7.6 cm.  I called him and made an urgent referral to  vascular surgery, and he was able to be seen that same day.  He was seen by Dr. Thomas, and plan is for EVAR on Monday, 12/4/17.  Today, Austin says that he feels well, although he and his wife are overwhelmed with everything that has happened recently.  He denies feeling any new lumps or bumps or new pains.  No fever/chills.          REVIEW OF SYSTEMS:   14 point ROS was reviewed and is negative other than as noted above in HPI.       HOME MEDICATIONS:  Current Outpatient Prescriptions   Medication Sig Dispense Refill     glimepiride (AMARYL) 1 MG tablet Take 1 tablet (1 mg) by mouth every morning (before breakfast) 90 tablet 3     metFORMIN (GLUCOPHAGE) 1000 MG tablet Take 1 tablet (1,000 mg) by mouth 2 times daily (with meals) 180 tablet 3     losartan-hydrochlorothiazide (HYZAAR) 50-12.5 MG per tablet Take 1 tablet by mouth daily 90 tablet 3     atorvastatin (LIPITOR) 20 MG tablet Take 1 tablet (20 mg) by mouth daily 90 tablet 3     blood glucose monitoring (ONE TOUCH ULTRA) test strip Test 2-3 times daily as directed, brand per insurance formulary. 100 each 0     Blood Glucose Monitoring Suppl (ONE TOUCH ULTRA 2) W/DEVICE KIT Use to test blood sugars 2-3 times daily as directed. Brand per insurance formulary   1 kit 0     MULTI-VITAMIN OR TABS 1 po qd       albuterol (PROAIR HFA/PROVENTIL HFA/VENTOLIN HFA) 108 (90 BASE) MCG/ACT Inhaler Inhale 2 puffs into the lungs every 6 hours as needed for shortness of breath / dyspnea or wheezing (Patient not taking: Reported on 12/1/2017) 1 Inhaler 0     ASPIRIN 81 MG OR TABS ONE DAILY 100 3         ALLERGIES:  Allergies   Allergen Reactions     Ace Inhibitors      cough         PAST MEDICAL HISTORY:  Past Medical History:   Diagnosis Date     Diaphragmatic hernia without mention of obstruction or gangrene      Diverticulitis of colon (without mention of hemorrhage)(562.11)      Esophageal reflux      Irritable bowel syndrome      Macular degeneration (senile) of retina,  unspecified      Squamous Cell Carcinoma, Back      Type II or unspecified type diabetes mellitus without mention of complication, not stated as uncontrolled      Unspecified essential hypertension          PAST SURGICAL HISTORY:  Past Surgical History:   Procedure Laterality Date     APPENDECTOMY       BONE MARROW BIOPSY, BONE SPECIMEN, NEEDLE/TROCAR N/A 2017    Procedure: BIOPSY BONE MARROW;  BONE MARROW BIOPSY;  Surgeon: Shady Garcia MD;  Location:  GI     COLONOSCOPY       SURGICAL HISTORY OF -       Squamous cell skin cancer resection - back     SURGICAL HISTORY OF -       skin tags resection     SURGICAL HISTORY OF -   2001    stress thall. normal     SURGICAL HISTORY OF -   2002    colonoscopy     SURGICAL HISTORY OF -       Fistulotomy with marsupialization for repair of fisture in ano         SOCIAL HISTORY:  Social History     Social History     Marital status:      Spouse name: librado     Number of children: 0     Years of education: 17     Occupational History     retired      Social History Main Topics     Smoking status: Former Smoker     Packs/day: 0.50     Years: 20.00     Quit date: 1975     Smokeless tobacco: Former User     Alcohol use 6.0 - 8.4 oz/week     10 - 14 Standard drinks or equivalent per week      Comment: occ     Drug use: No     Sexual activity: No     Other Topics Concern     Not on file     Social History Narrative   He quit smoking in .  He drinks 1-3 glasses of wine a night with dinner.  He is retired, and used to work as a .  He lives with his wife in [place].  His cousin had lung cancer and  around age 69.  Otherwise, he denies family history of cancer.        FAMILY HISTORY:  Family History   Problem Relation Age of Onset     CEREBROVASCULAR DISEASE Father      x 2     Hypertension Mother      C.A.D. Mother      CANCER Mother      skin     Eye Disorder Mother      glaucoma     Prostate Cancer No family hx of       Cancer - colorectal No family hx of          PHYSICAL EXAM:  Vital signs:  /78 (BP Location: Right arm, Patient Position: Chair, Cuff Size: Adult Regular)  Pulse 72  Temp 98  F (36.7  C)  Resp 18  Wt 97.5 kg (215 lb)  SpO2 97%  BMI 31.75 kg/m2   GENERAL/CONSTITUTIONAL: No acute distress. Accompanied by wife.  EYES: No scleral icterus.  LYMPH: No anterior cervical, posterior cervical, or supraclavicular adenopathy.   RESPIRATORY: Clear to auscultation bilaterally. No crackles or wheezing.   CARDIOVASCULAR: Regular rate and rhythm without murmurs, gallops, or rubs.  GASTROINTESTINAL: No tenderness. The patient has normal bowel sounds. No guarding.  No distention.  MUSCULOSKELETAL: Warm and well-perfused, no cyanosis, clubbing, or edema.  NEUROLOGIC: Alert, oriented, answers questions appropriately.  INTEGUMENTARY: No jaundice.      LABS:  CBC RESULTS:   Recent Labs   Lab Test  11/21/17   0945   WBC  52.4*   RBC  2.93*   HGB  9.9*   HCT  30.0*   MCV  102*   MCH  33.8*   MCHC  33.0   RDW  15.2*   PLT  132*     Recent Labs   Lab Test  11/16/17   0804  11/17/16   0731   NA  141  141   POTASSIUM  4.1  4.0   CHLORIDE  107  105   CO2  25  26   ANIONGAP  9  10   GLC  139*  132*   BUN  29  20   CR  1.19  1.13   MAGDALENO  9.0  9.0     Lab Results   Component Value Date    AST 15 11/16/2017     Lab Results   Component Value Date    ALT 25 11/16/2017     Lab Results   Component Value Date    BILICONJ 0.0 04/29/2005      Lab Results   Component Value Date    BILITOTAL 0.6 11/16/2017     Lab Results   Component Value Date    ALBUMIN 3.9 11/16/2017     Lab Results   Component Value Date    PROTTOTAL 7.3 11/16/2017      Lab Results   Component Value Date    ALKPHOS 55 11/16/2017     Component      Latest Ref Rng & Units 11/16/2017   Uric Acid      3.5 - 7.2 mg/dL 7.4 (H)   Lactate Dehydrogenase      85 - 227 U/L 188       PATHOLOGY:  Bone marrow biopsy 11/21/17:  FINAL DIAGNOSIS:   Peripheral blood demonstrating macrocytic  anemia with thrombocytopenia     Bone marrow trephine biopsy and aspirate demonstrating hypercellular   bone marrow involved by chronic lymphocytic leukemia/small lymphocytic   lymphoma, 13% ringed sideroblasts identified (see comment)     COMMENT:   Immunophenotyping studies demonstrate a kappa monotypic CD5 positive   B-cell population most compatible with chronic lymphocytic leukemia.  An   immunoperoxidase stain for cyclin D1 is pending.  Dysplastic changes are   not identified within the erythroid series.  However, the presence of   increased numbers of ringed sideroblasts raises the possibility of an   associated myelodysplastic syndrome, particularly in light of the noted   increased mean red cell volume and associated thrombocytopenia.  The   thrombocytopenia may also reflect reduced numbers of megakaryocytes   given the involvement of marrow by chronic lymphocytic leukemia.  Many   cases exhibiting ringed sideroblasts are metabolic in origin, without   significant risk of progression to acute leukemia, and these typically   do not show dysplastic morphology as is the case with the current   specimen.  Moreover, 15% ringed sideroblasts are required for a   diagnosis of  RARS. Cytogenetic studies are pending.     Flow  INTERPRETATION:   Bone Marrow, Left:        CD5 positive Kappa monotypic B cells (82%)     COMMENT:   The clonal B-cell population present in this specimen has a similar   immunophenotype as reported previously in the blood from this patient   ([id number]).  The immunophenotypic findings are suggestive of marrow   involvement by small lymphocytic lymphoma/chronic lymphocytic leukemia   (SLL/CLL). Large cells may not survive specimen processing.  There may   be peripheral blood contamination. Final interpretation requires   correlation with results of other ancillary studies, morphologic and   clinical features.     Cytogenetics are pending.      IMAGING:  CT c/a/p 11/29/17:  FINDINGS:   Chest:  Possible minor fissure lymph node on series 3, image 45 along  the right minor fissure measuring 0.3 cm. Anterior right upper lobe  nodule measures 0.3 cm on image 40. A lateral right lower lobe nodule  measures 0.4 cm on image 47. Bibasilar atelectasis is noted. No  infiltrate or consolidation. No pleural or pericardial fluid. Heart is  normal in size. Esophagus is unremarkable. A few small mediastinal and  hilar lymph nodes are present, but none exceed size criteria for  abnormality. Bilateral axillary lymph nodes are also present. The  largest is in the left axilla measuring 1.7 x 1.1 cm on series 2,  image 18. Coronary artery calcification is present. Aorta demonstrates  scattered calcified plaque without aneurysm.     Abdomen: A large infrarenal abdominal aortic aneurysm is noted  measuring 7.6 x 6.7 cm on series 2, image 80 and contains a moderate  amount of mural thrombus. There is slight haziness around the margin  of the aneurysm sac of uncertain significance as a few scattered lymph  nodes are noted in this area. Periaortic inflammation is possible. No  obvious retroperitoneal hemorrhage at this time. A periaortic node on  series 2, image 76 near the superior aspect of the aneurysm measures  1.9 x 1.4 cm. Aneurysm does not extend down into the common iliac  arteries. Probable calcified gallstone in the gallbladder on series 2,  image 69. Gallbladder is otherwise within normal limits. The liver,  pancreas, adrenal glands and kidneys are unremarkable. No  hydronephrosis or renal calculi. Spleen is prominent in size without  focal mass measuring 15.7 cm in AP dimension. Tiny cyst posterior  spleen is noted on series 2, image 64. No evidence of bowel  obstruction or diverticulitis.     Pelvis: The bladder and rectum are unremarkable. No enlarged pelvic  lymph nodes or free fluid. Bone window examination demonstrates  degenerative spine changes.         IMPRESSION:  1. Mildly enlarged left axillary lymph node  and periaortic lymph nodes  in the retroperitoneum of the abdomen. Spleen is also enlarged.  Findings would be consistent with patient's underlying diagnosis of  CLL. Follow up as clinically warranted.  2. Infrarenal abdominal aortic aneurysm measuring up to 7.6 cm in  diameter. Mild indistinctness of the aneurysm wall could be due to  inflammation. No retroperitoneal hemorrhage is currently evident.  Recommend followup with vascular surgery or interventional radiology  for further assessment.        ASSESSMENT/PLAN:  Austin Garza is a 77 year old male with:    1) CLL/SLL: BLAKCWOOD stage III, with lymphocytosis, lymphadenopathy, splenomegaly, and anemia.  He has mild thrombocytopenia as well, but is above 100K.  He has leukocytosis with WBC of 61.7K, hemoglobin of 10.4, and platelet count of 115K on diagnosis.  Repeat CBC on 11/21/17 shows stable CBC overall, with WBC of 52.4K, hemoglobin 9.9, and platelet count of 132K.  LDH is normal.  Uric acid was slightly elevated.  Bone marrow biopsy and flow cytometry confirmed CLL/SLL.  Cytogenetics still pending.  CT scan shows mildly enlarged left axillary lymph node and periaortic lymph nodes int he retroperitoneum of the abdomen, with enlarged spleen.      I reviewed this diagnosis with Austin and his wife.  At this point, his CBC appears stable.  He has elevated WBC, but is not necessarily an indication to start treatment.  If he has increased doubling time of his lymphocyte account and progressive anemia and thrombobocytopenia, then we may consider treatment.  If he has worsening lymphadenopathy that is causing symptoms or organ dysfunction, then we may consider treatment.  He does not have B-symptoms currently.  In addition, with his large aortic aneurysm and upcoming EVAR procedure, I would not start treatment for CLL at this point anyway.  He will focus on the aneurysm repair and healing from that for now.  I recommended that we watch and wait for now.  Austin agrees  "with this, and we will continue to monitor his CBC periodically.      -still awaiting cytogenetics from bone marrow biopsy  -RTC in 1 month at the [facility] with labs    2) Abdominal aortic aneurysm: measures up to 7.6 cm on CT scan, incidentally found.  Patient is asymptomatic.  He saw vascular surgery, and is planning to undergo EVAR on Monday, 12/4/17.  -follow-up with vascular surgery    3) Diabetes, hypertension:  -following with PCP      I spent a total of 40 minutes with the patient, with over >50% of the time in counseling and/or coordination of care.      Breana Perry MD  Hematology/Oncology  [facility]      Oncology Rooming Note    December 1, 2017 9:55 AM   Austin Garza is a 77 year old male who presents for:    Chief Complaint   Patient presents with     Oncology Clinic Visit     Return-follow up - CLL (chronic lymphocytic leukemia)      Initial Vitals: /78 (BP Location: Right arm, Patient Position: Chair, Cuff Size: Adult Regular)  Pulse 72  Temp 98  F (36.7  C)  Resp 18  Wt 97.5 kg (215 lb)  SpO2 97%  BMI 31.75 kg/m2 Estimated body mass index is 31.75 kg/(m^2) as calculated from the following:    Height as of 11/29/17: 1.753 m (5' 9\").    Weight as of this encounter: 97.5 kg (215 lb). Body surface area is 2.18 meters squared.  Data Unavailable Comment: Data Unavailable   No LMP for male patient.  Allergies reviewed: Yes  Medications reviewed: Yes    Medications: Medication refills not needed today.  Pharmacy name entered into [employer]:    [id number] - [place], [place] - [address] AT [facility] - [facility] [address]    Clinical concerns: none     5 minutes for nursing intake (face to face time)     Og Longoria CMA          DISCHARGE PLAN:  Next appointments: See patient instruction section. Pt instructions reviewed with pt. Pt brought to lobby to schedule lab and follow " up.  Departure Mode: Ambulatory  Accompanied by: self  2 minutes for nursing discharge (face to face time)   Maria Isabel King CMA        Again, thank you for allowing me to participate in the care of your patient.        Sincerely,        Breana Perry MD

## 2017-12-01 NOTE — MR AVS SNAPSHOT
After Visit Summary   12/1/2017    Austin Garza    MRN: [id number]           Patient Information     Date Of Birth          1940        Visit Information        Provider Department      12/1/2017 1:40 PM Morgan Ochoa MD [facility]        Today's Diagnoses     Preop general physical exam    -  1    Abdominal aortic aneurysm (AAA) without rupture (H)        CLL (chronic lymphocytic leukemia) (H)        Hypertension goal BP (blood pressure) < 140/90        Type 2 diabetes mellitus without complication, without long-term current use of insulin (H)          Care Instructions      Before Your Surgery      Call your surgeon if there is any change in your health. This includes signs of a cold or flu (such as a sore throat, runny nose, cough, rash or fever).    Do not smoke, drink alcohol or take over the counter medicine (unless your surgeon or primary care doctor tells you to) for the 24 hours before and after surgery.    If you take prescribed drugs: Follow your doctor s orders about which medicines to take and which to stop until after surgery.    Eating and drinking prior to surgery: follow the instructions from your surgeon    Take a shower or bath the night before surgery. Use the soap your surgeon gave you to gently clean your skin. If you do not have soap from your surgeon, use your regular soap. Do not shave or scrub the surgery site.  Wear clean pajamas and have clean sheets on your bed.           Follow-ups after your visit        Your next 10 appointments already scheduled     Dec 04, 2017  7:15 AM CST   [facility] OR with Milton Cazares MD   [facility] Surgery [facility] ([facility])    [facility] ([facility])   [phone]            Dec 04, 2017   Procedure with Milton Cazares MD   [facility] PeriOP Services (--)    [address]  [facility] [address]    [phone]            Dec 04, 2017  7:30 AM CST   (Arrive by 7:15 AM)   IR ABDOMINAL ENDOVASCULAR STENT GRAFT with [id number]   [facility] Interventional Radiology ([facility])    [facility] [facility] [address] [phone]           1. You will need to have had a history and physical exam within 7 days of the procedure. 2. Laboratory test are to be obtained by your doctor prior to the exam (CBCP, INR and PTT) 3. Someone will need to drive you to and from the hospital. 4. If you are or may be pregnant, contact your doctor or a Radiology nurse prior to the day of the exam. 5. If you have diabetes, check with your doctor or a Radiology nurse to see if your insulin needs to be adjusted for the exam. 6. If you are taking Coumadin (to thin you blood) please contact your doctor or a Radiology nurse at least 3 days before the exam for special instructions. 7. The day before your exam you may eat your regular diet and are encouraged to drink at least 2 quarts of clear liquids. Drink no alcoholic beverages for 24 hours prior to the exam. 8. Do not eat any solid food or milk products for 6 hours prior to the exam. You may drink clear liquids until 2 hours prior to the exam. Clear liquids include the following: water, Jell-O, clear broth, apple juice or any noncarbonated drink that you can see through (no pop!) 9. The morning of the exam you may brush your teeth and take medications as directed with a sip of water. 10. Tell the Radiology nurse if you have any allergies.            Dec 19, 2017  3:00 PM CST   New Visit with Justino Mena [facility] ([facility])    [address]  [facility] [address]   [phone]            Jan 04, 2018  9:45 AM CST   Return Visit with Breana Perry MD   [facility] ([facility])    [facility]  [address] [place]  [address]  [facility] [address]   [phone]              Future tests that were ordered for you today     Open Future Orders        Priority Expected Expires Ordered    CBC with platelets differential Routine 1/4/2018 12/1/2018 12/1/2017    Comprehensive metabolic panel Routine 1/4/2018 12/1/2018 12/1/2017    Lactate Dehydrogenase Routine 1/4/2018 12/1/2018 12/1/2017    Uric acid Routine 1/4/2018 12/1/2018 12/1/2017    IR Abdominal Endovascular Stent Graft Routine  12/1/2018 12/1/2017            Who to contact     If you have questions or need follow up information about today's clinic visit or your schedule please contact [facility] [place] directly at [phone].  Normal or non-critical lab and imaging results will be communicated to you by MyChart, letter or phone within 4 business days after the clinic has received the results. If you do not hear from us within 7 days, please contact the clinic through [employer] or phone. If you have a critical or abnormal lab result, we will notify you by phone as soon as possible.  Submit refill requests through [employer] or call your pharmacy and they will forward the refill request to us. Please allow 3 business days for your refill to be completed.          Additional Information About Your Visit        [employer] Information     [employer] gives you secure access to your electronic health record. If you see a primary care provider, you can also send messages to your care team and make appointments. If you have questions, please call your primary care clinic.  If you do not have a primary care provider, please call [phone] and they will assist you.        Care EveryWhere ID     This is your Care EveryWhere ID. This could be used by other organizations to access your [place] medical records  [id number]        Your Vitals Were     Pulse Temperature Respirations Height BMI (Body Mass Index)       96 97.7  F (36.5  C) (Oral) 20 5' 8\" (1.727 m) 32.52 kg/m2        Blood " Pressure from Last 3 Encounters:   12/01/17 130/68   12/01/17 122/78   11/29/17 123/75    Weight from Last 3 Encounters:   12/01/17 213 lb 14.4 oz (97 kg)   12/01/17 215 lb (97.5 kg)   11/29/17 210 lb (95.3 kg)              We Performed the Following     EKG 12-lead complete w/read - Clinics        Primary Care Provider Office Phone # Fax #    Sandi Eastman [phone] [address] [place] [address]        Equal Access to Services     [facility]: Hadii aad ku hadasho Soomaali, waaxda luqadaha, qaybta kaalmada adeegyada, mina pastor haygavino adesheri bennett . So [facility] [phone].    ATENCIÓN: Si habla español, tiene a edward disposición servicios gratuitos de asistencia lingüística. [facility] [phone].    We comply with applicable federal civil rights laws and [place] laws. We do not discriminate on the basis of race, color, national origin, age, disability, sex, sexual orientation, or gender identity.            Thank you!     [facility]  [facility]. Our goal is always to provide you with excellent care. Hearing back from our patients is one way we can continue to improve our services. Please take a few minutes to complete the written survey that you may receive in the mail after your visit with us. Thank you!             Your Updated Medication List - Protect others around you: Learn how to safely use, store and throw away your medicines at www.disposemymeds.org.          This list is accurate as of: 12/1/17  2:41 PM.  Always use your most recent med list.                   Brand Name Dispense Instructions for use Diagnosis    albuterol 108 (90 BASE) MCG/ACT Inhaler    PROAIR HFA/PROVENTIL HFA/VENTOLIN HFA    1 Inhaler    Inhale 2 puffs into the lungs every 6 hours as needed for shortness of breath / dyspnea or wheezing    Acute bronchospasm       aspirin 81 MG tablet     100    ONE DAILY    Type II or unspecified type diabetes  mellitus without mention of complication, not stated as uncontrolled, Unspecified essential hypertension       atorvastatin 20 MG tablet    LIPITOR    90 tablet    Take 1 tablet (20 mg) by mouth daily    Type 2 diabetes mellitus without complication, without long-term current use of insulin (H), Hyperlipidemia LDL goal <100       blood glucose monitoring meter device kit     1 kit    Use to test blood sugars 2-3 times daily as directed. Brand per insurance formulary    Type II or unspecified type diabetes mellitus without mention of complication, not stated as uncontrolled       blood glucose monitoring test strip    ONETOUCH ULTRA    100 each    Test 2-3 times daily as directed, brand per insurance formulary.    Type 2 diabetes mellitus without complication, without long-term current use of insulin (H)       glimepiride 1 MG tablet    AMARYL    90 tablet    Take 1 tablet (1 mg) by mouth every morning (before breakfast)    Type 2 diabetes mellitus without complication, without long-term current use of insulin (H)       losartan-hydrochlorothiazide 50-12.5 MG per tablet    HYZAAR    90 tablet    Take 1 tablet by mouth daily    Hypertension goal BP (blood pressure) < 140/90       metFORMIN 1000 MG tablet    GLUCOPHAGE    180 tablet    Take 1 tablet (1,000 mg) by mouth 2 times daily (with meals)    Type 2 diabetes mellitus without complication, without long-term current use of insulin (H)       Multi-vitamin Tabs tablet   Generic drug:  multivitamin, therapeutic with minerals      1 po qd

## 2017-12-01 NOTE — PROGRESS NOTES
"Oncology Rooming Note    December 1, 2017 9:55 AM   Austin Garza is a 77 year old male who presents for:    Chief Complaint   Patient presents with     Oncology Clinic Visit     Return-follow up - CLL (chronic lymphocytic leukemia)      Initial Vitals: /78 (BP Location: Right arm, Patient Position: Chair, Cuff Size: Adult Regular)  Pulse 72  Temp 98  F (36.7  C)  Resp 18  Wt 97.5 kg (215 lb)  SpO2 97%  BMI 31.75 kg/m2 Estimated body mass index is 31.75 kg/(m^2) as calculated from the following:    Height as of 11/29/17: 1.753 m (5' 9\").    Weight as of this encounter: 97.5 kg (215 lb). Body surface area is 2.18 meters squared.  Data Unavailable Comment: Data Unavailable   No LMP for male patient.  Allergies reviewed: Yes  Medications reviewed: Yes    Medications: Medication refills not needed today.  Pharmacy name entered into Zoom:    [id number] - [place], [place] - [address] AT [facility] - [facility] [address]    Clinical concerns: none     5 minutes for nursing intake (face to face time)     Og Longoria CMA          DISCHARGE PLAN:  Next appointments: See patient instruction section. Pt instructions reviewed with pt. Pt brought to lobby to schedule lab and follow up.  Departure Mode: Ambulatory  Accompanied by: self  2 minutes for nursing discharge (face to face time)   Maria Isabel King CMA      "

## 2017-12-01 NOTE — NURSING NOTE
"Chief Complaint   Patient presents with     Pre-Op Exam       Initial /68  Pulse 96  Temp 97.7  F (36.5  C) (Oral)  Resp 20  Ht 5' 8\" (1.727 m)  Wt 213 lb 14.4 oz (97 kg)  BMI 32.52 kg/m2 Estimated body mass index is 32.52 kg/(m^2) as calculated from the following:    Height as of this encounter: 5' 8\" (1.727 m).    Weight as of this encounter: 213 lb 14.4 oz (97 kg).  Medication Reconciliation: complete   Lisette J, CMA,AAMA      "

## 2017-12-01 NOTE — PATIENT INSTRUCTIONS
-return to clinic on 1/4/18 with labs at the [facility]  Scheduled - Lavinia    AVS given to patient - Lavinia

## 2017-12-01 NOTE — PROGRESS NOTES
[facility]    Hematology/Oncology Established Patient Note      Today's Date: 12/01/17    Reason for Follow-up: CLL      HISTORY OF PRESENT ILLNESS: Austin Garza is a 77 year old male with PMHx of DMII, HTN who presented with leukocytosis.  In November 2017, he was found to have elevated WBC of 61.7K, hemoglobin of 10.4, and platelet of 115K.  There were no other CBC results available between 2010 and 2017.  Prior to 2010, he had normal CBC, with normal to mild anemia, and mild thrombocytopenia.   He was asymptomatic.    He underwent bone marrow biopsy on 11/21/17, which was consistent with chronic lymphocytic leukemia/small lymphocytic lymphoma, with 13% ringed sideroblasts identified.  The presence of increased numbers of ringed sideroblasts raises the possibility of an associated myelodysplastic syndrome.  Dysplastic changes are not identified though.  Many cases exhibiting ringed sideroblasts are metabolic origin, without significant risk of progression to acute leukemia, and these typically do not show dysplastic morphology as is the case with this specimen.  15% ringed sideroblasts are required for a diagnosis for RARS, which this specimen does not meet.  Flow cytometry is also consistent with CLL/SLL.  Cytogenetics are still pending.    CT scan on 11/29/17 shows mildly enlarged left axillary lymph node and periaortic lymph nodes in the retroperitoneum of the abdomen.  Spleen is also enlarged.      INTERIM HISTORY: Austin is here for follow-up with his wife today.  On his staging CT scan for CLL, he was incidentally found to have a large infrarenal abdominal aortic aneurysm, measuring 7.6 cm.  I called him and made an urgent referral to vascular surgery, and he was able to be seen that same day.  He was seen by Dr. Thomas, and plan is for EVAR on Monday, 12/4/17.  Today, Austin says that he feels well, although he and his wife are overwhelmed with everything that has happened  recently.  He denies feeling any new lumps or bumps or new pains.  No fever/chills.          REVIEW OF SYSTEMS:   14 point ROS was reviewed and is negative other than as noted above in HPI.       HOME MEDICATIONS:  Current Outpatient Prescriptions   Medication Sig Dispense Refill     glimepiride (AMARYL) 1 MG tablet Take 1 tablet (1 mg) by mouth every morning (before breakfast) 90 tablet 3     metFORMIN (GLUCOPHAGE) 1000 MG tablet Take 1 tablet (1,000 mg) by mouth 2 times daily (with meals) 180 tablet 3     losartan-hydrochlorothiazide (HYZAAR) 50-12.5 MG per tablet Take 1 tablet by mouth daily 90 tablet 3     atorvastatin (LIPITOR) 20 MG tablet Take 1 tablet (20 mg) by mouth daily 90 tablet 3     blood glucose monitoring (ONE TOUCH ULTRA) test strip Test 2-3 times daily as directed, brand per insurance formulary. 100 each 0     Blood Glucose Monitoring Suppl (ONE TOUCH ULTRA 2) W/DEVICE KIT Use to test blood sugars 2-3 times daily as directed. Brand per insurance formulary   1 kit 0     MULTI-VITAMIN OR TABS 1 po qd       albuterol (PROAIR HFA/PROVENTIL HFA/VENTOLIN HFA) 108 (90 BASE) MCG/ACT Inhaler Inhale 2 puffs into the lungs every 6 hours as needed for shortness of breath / dyspnea or wheezing (Patient not taking: Reported on 12/1/2017) 1 Inhaler 0     ASPIRIN 81 MG OR TABS ONE DAILY 100 3         ALLERGIES:  Allergies   Allergen Reactions     Ace Inhibitors      cough         PAST MEDICAL HISTORY:  Past Medical History:   Diagnosis Date     Diaphragmatic hernia without mention of obstruction or gangrene      Diverticulitis of colon (without mention of hemorrhage)(562.11)      Esophageal reflux      Irritable bowel syndrome      Macular degeneration (senile) of retina, unspecified      Squamous Cell Carcinoma, Back 1988     Type II or unspecified type diabetes mellitus without mention of complication, not stated as uncontrolled      Unspecified essential hypertension          PAST SURGICAL HISTORY:  Past  Surgical History:   Procedure Laterality Date     APPENDECTOMY       BONE MARROW BIOPSY, BONE SPECIMEN, NEEDLE/TROCAR N/A 2017    Procedure: BIOPSY BONE MARROW;  BONE MARROW BIOPSY;  Surgeon: Shady Garcia MD;  Location:  GI     COLONOSCOPY       SURGICAL HISTORY OF -       Squamous cell skin cancer resection - back     SURGICAL HISTORY OF -       skin tags resection     SURGICAL HISTORY OF -   2001    stress thall. normal     SURGICAL HISTORY OF -   2002    colonoscopy     SURGICAL HISTORY OF -       Fistulotomy with marsupialization for repair of fisture in ano         SOCIAL HISTORY:  Social History     Social History     Marital status:      Spouse name: librado     Number of children: 0     Years of education: 17     Occupational History     retired      Social History Main Topics     Smoking status: Former Smoker     Packs/day: 0.50     Years: 20.00     Quit date: 1975     Smokeless tobacco: Former User     Alcohol use 6.0 - 8.4 oz/week     10 - 14 Standard drinks or equivalent per week      Comment: occ     Drug use: No     Sexual activity: No     Other Topics Concern     Not on file     Social History Narrative   He quit smoking in .  He drinks 1-3 glasses of wine a night with dinner.  He is retired, and used to work as a .  He lives with his wife in [place].  His cousin had lung cancer and  around age 69.  Otherwise, he denies family history of cancer.        FAMILY HISTORY:  Family History   Problem Relation Age of Onset     CEREBROVASCULAR DISEASE Father      x 2     Hypertension Mother      C.A.D. Mother      CANCER Mother      skin     Eye Disorder Mother      glaucoma     Prostate Cancer No family hx of      Cancer - colorectal No family hx of          PHYSICAL EXAM:  Vital signs:  /78 (BP Location: Right arm, Patient Position: Chair, Cuff Size: Adult Regular)  Pulse 72  Temp 98  F (36.7  C)  Resp 18  Wt 97.5 kg (215 lb)  SpO2  97%  BMI 31.75 kg/m2   GENERAL/CONSTITUTIONAL: No acute distress. Accompanied by wife.  EYES: No scleral icterus.  LYMPH: No anterior cervical, posterior cervical, or supraclavicular adenopathy.   RESPIRATORY: Clear to auscultation bilaterally. No crackles or wheezing.   CARDIOVASCULAR: Regular rate and rhythm without murmurs, gallops, or rubs.  GASTROINTESTINAL: No tenderness. The patient has normal bowel sounds. No guarding.  No distention.  MUSCULOSKELETAL: Warm and well-perfused, no cyanosis, clubbing, or edema.  NEUROLOGIC: Alert, oriented, answers questions appropriately.  INTEGUMENTARY: No jaundice.      LABS:  CBC RESULTS:   Recent Labs   Lab Test  11/21/17   0945   WBC  52.4*   RBC  2.93*   HGB  9.9*   HCT  30.0*   MCV  102*   MCH  33.8*   MCHC  33.0   RDW  15.2*   PLT  132*     Recent Labs   Lab Test  11/16/17   0804  11/17/16   0731   NA  141  141   POTASSIUM  4.1  4.0   CHLORIDE  107  105   CO2  25  26   ANIONGAP  9  10   GLC  139*  132*   BUN  29  20   CR  1.19  1.13   MAGDALENO  9.0  9.0     Lab Results   Component Value Date    AST 15 11/16/2017     Lab Results   Component Value Date    ALT 25 11/16/2017     Lab Results   Component Value Date    BILICONJ 0.0 04/29/2005      Lab Results   Component Value Date    BILITOTAL 0.6 11/16/2017     Lab Results   Component Value Date    ALBUMIN 3.9 11/16/2017     Lab Results   Component Value Date    PROTTOTAL 7.3 11/16/2017      Lab Results   Component Value Date    ALKPHOS 55 11/16/2017     Component      Latest Ref Rng & Units 11/16/2017   Uric Acid      3.5 - 7.2 mg/dL 7.4 (H)   Lactate Dehydrogenase      85 - 227 U/L 188       PATHOLOGY:  Bone marrow biopsy 11/21/17:  FINAL DIAGNOSIS:   Peripheral blood demonstrating macrocytic anemia with thrombocytopenia     Bone marrow trephine biopsy and aspirate demonstrating hypercellular   bone marrow involved by chronic lymphocytic leukemia/small lymphocytic   lymphoma, 13% ringed sideroblasts identified (see comment)      COMMENT:   Immunophenotyping studies demonstrate a kappa monotypic CD5 positive   B-cell population most compatible with chronic lymphocytic leukemia.  An   immunoperoxidase stain for cyclin D1 is pending.  Dysplastic changes are   not identified within the erythroid series.  However, the presence of   increased numbers of ringed sideroblasts raises the possibility of an   associated myelodysplastic syndrome, particularly in light of the noted   increased mean red cell volume and associated thrombocytopenia.  The   thrombocytopenia may also reflect reduced numbers of megakaryocytes   given the involvement of marrow by chronic lymphocytic leukemia.  Many   cases exhibiting ringed sideroblasts are metabolic in origin, without   significant risk of progression to acute leukemia, and these typically   do not show dysplastic morphology as is the case with the current   specimen.  Moreover, 15% ringed sideroblasts are required for a   diagnosis of  RARS. Cytogenetic studies are pending.     Flow  INTERPRETATION:   Bone Marrow, Left:        CD5 positive Kappa monotypic B cells (82%)     COMMENT:   The clonal B-cell population present in this specimen has a similar   immunophenotype as reported previously in the blood from this patient   ([id number]).  The immunophenotypic findings are suggestive of marrow   involvement by small lymphocytic lymphoma/chronic lymphocytic leukemia   (SLL/CLL). Large cells may not survive specimen processing.  There may   be peripheral blood contamination. Final interpretation requires   correlation with results of other ancillary studies, morphologic and   clinical features.     Cytogenetics are pending.      IMAGING:  CT c/a/p 11/29/17:  FINDINGS:   Chest: Possible minor fissure lymph node on series 3, image 45 along  the right minor fissure measuring 0.3 cm. Anterior right upper lobe  nodule measures 0.3 cm on image 40. A lateral right lower lobe nodule  measures 0.4 cm on image 47.  Bibasilar atelectasis is noted. No  infiltrate or consolidation. No pleural or pericardial fluid. Heart is  normal in size. Esophagus is unremarkable. A few small mediastinal and  hilar lymph nodes are present, but none exceed size criteria for  abnormality. Bilateral axillary lymph nodes are also present. The  largest is in the left axilla measuring 1.7 x 1.1 cm on series 2,  image 18. Coronary artery calcification is present. Aorta demonstrates  scattered calcified plaque without aneurysm.     Abdomen: A large infrarenal abdominal aortic aneurysm is noted  measuring 7.6 x 6.7 cm on series 2, image 80 and contains a moderate  amount of mural thrombus. There is slight haziness around the margin  of the aneurysm sac of uncertain significance as a few scattered lymph  nodes are noted in this area. Periaortic inflammation is possible. No  obvious retroperitoneal hemorrhage at this time. A periaortic node on  series 2, image 76 near the superior aspect of the aneurysm measures  1.9 x 1.4 cm. Aneurysm does not extend down into the common iliac  arteries. Probable calcified gallstone in the gallbladder on series 2,  image 69. Gallbladder is otherwise within normal limits. The liver,  pancreas, adrenal glands and kidneys are unremarkable. No  hydronephrosis or renal calculi. Spleen is prominent in size without  focal mass measuring 15.7 cm in AP dimension. Tiny cyst posterior  spleen is noted on series 2, image 64. No evidence of bowel  obstruction or diverticulitis.     Pelvis: The bladder and rectum are unremarkable. No enlarged pelvic  lymph nodes or free fluid. Bone window examination demonstrates  degenerative spine changes.         IMPRESSION:  1. Mildly enlarged left axillary lymph node and periaortic lymph nodes  in the retroperitoneum of the abdomen. Spleen is also enlarged.  Findings would be consistent with patient's underlying diagnosis of  CLL. Follow up as clinically warranted.  2. Infrarenal abdominal aortic  aneurysm measuring up to 7.6 cm in  diameter. Mild indistinctness of the aneurysm wall could be due to  inflammation. No retroperitoneal hemorrhage is currently evident.  Recommend followup with vascular surgery or interventional radiology  for further assessment.        ASSESSMENT/PLAN:  Austin Garza is a 77 year old male with:    1) CLL/SLL: BLACKWOOD stage III, with lymphocytosis, lymphadenopathy, splenomegaly, and anemia.  He has mild thrombocytopenia as well, but is above 100K.  He has leukocytosis with WBC of 61.7K, hemoglobin of 10.4, and platelet count of 115K on diagnosis.  Repeat CBC on 11/21/17 shows stable CBC overall, with WBC of 52.4K, hemoglobin 9.9, and platelet count of 132K.  LDH is normal.  Uric acid was slightly elevated.  Bone marrow biopsy and flow cytometry confirmed CLL/SLL.  Cytogenetics still pending.  CT scan shows mildly enlarged left axillary lymph node and periaortic lymph nodes int he retroperitoneum of the abdomen, with enlarged spleen.      I reviewed this diagnosis with Austin and his wife.  At this point, his CBC appears stable.  He has elevated WBC, but is not necessarily an indication to start treatment.  If he has increased doubling time of his lymphocyte account and progressive anemia and thrombobocytopenia, then we may consider treatment.  If he has worsening lymphadenopathy that is causing symptoms or organ dysfunction, then we may consider treatment.  He does not have B-symptoms currently.  In addition, with his large aortic aneurysm and upcoming EVAR procedure, I would not start treatment for CLL at this point anyway.  He will focus on the aneurysm repair and healing from that for now.  I recommended that we watch and wait for now.  Austin agrees with this, and we will continue to monitor his CBC periodically.      -still awaiting cytogenetics from bone marrow biopsy  -RTC in 1 month at the [facility] with labs    2) Abdominal aortic aneurysm: measures up to 7.6 cm on CT  scan, incidentally found.  Patient is asymptomatic.  He saw vascular surgery, and is planning to undergo EVAR on Monday, 12/4/17.  -follow-up with vascular surgery    3) Diabetes, hypertension:  -following with PCP      I spent a total of 40 minutes with the patient, with over >50% of the time in counseling and/or coordination of care.      Breana Perry MD  Hematology/Oncology  [facility]

## 2017-12-01 NOTE — PROGRESS NOTES
[facility] [place]  [address]  [address]  [place] [address]  [phone]  Dept: [phone]    PRE-OP EVALUATION:  Today's date: 2017    Austin Garza (: 1940) presents for pre-operative evaluation assessment as requested by Dr. Thomas.  He requires evaluation and anesthesia risk assessment prior to undergoing surgery/procedure for treatment of aneurysm .  Proposed procedure: aneurysm procedure    Date of Surgery/ Procedure: 2017  Time of Surgery/ Procedure: 7:30am  Hospital/Surgical Facility: [facility]  Fax number for surgical facility:   Primary Physician: Sandi Eastman  Type of Anesthesia Anticipated: General    Patient has a Health Care Directive or Living Will:  YES     Preop Questions 2017   1.  Do you have a history of heart attack, stroke, stent, bypass or surgery on an artery in the head, neck, heart or legs? No   2.  Do you ever have any pain or discomfort in your chest? No   3.  Do you have a history of  Heart Failure? No   4.   Are you troubled by shortness of breath when:  walking on a level surface, or up a slight hill, or at night? No   5.  Do you currently have a cold, bronchitis or other respiratory infection? No   6.  Do you have a cough, shortness of breath, or wheezing? No   7.  Do you sometimes get pains in the calves of your legs when you walk? No   8. Do you or anyone in your family have previous history of blood clots? No   9.  Do you or does anyone in your family have a serious bleeding problem such as prolonged bleeding following surgeries or cuts? No   10. Have you ever had problems with anemia or been told to take iron pills? No   11. Have you had any abnormal blood loss such as black, tarry or bloody stools? No   12. Have you ever had a blood transfusion? No   13. Have you or any of your relatives ever had problems with anesthesia? YES - CONSTIPATION   14. Do you have sleep apnea, excessive snoring or daytime  drowsiness? No   15. Do you have any prosthetic heart valves? No   16. Do you have prosthetic joints? No           HPI:                                                      Brief HPI related to upcoming procedure: patient with new diagnosis AAA, also new diagnosis likely CLL, feels well, last a1c < 7. BPs within acceptable limits. feels well. some mild LUQ discomfort over the last few months, never severe appetite is good. no chest pain or shortness of breath. mood is good too even with recent diagnoses.       See problem list for active medical problems.  Problems all longstanding and stable, except as noted/documented.  See ROS for pertinent symptoms related to these conditions.                                                                                                    .  DIABETES - Patient has a longstanding history of DiabetesType Type II . Patient is being treated with diet and oral agents and denies significant side effects. Control has been good. Complicating factors include but are not limited to: none.        Lab Results   Component Value Date    A1C 6.3 11/16/2017    A1C 6.7 04/11/2017    A1C 6.2 08/10/2016    A1C 6.5 10/23/2015    A1C 6.2 03/13/2015                                                                                                                .  HYPERTENSION - Patient has longstanding history of mod-severe HTN , currently denies any symptoms referable to elevated blood pressure. Specifically denies chest pain, palpitations, dyspnea, orthopnea, PND or peripheral edema. Blood pressure readings have been in normal range. Current medication regimen is as listed below. Patient denies any side effects of medication.        BP Readings from Last 6 Encounters:   12/01/17 130/68   12/01/17 122/78   11/29/17 123/75   11/21/17 129/68   11/16/17 130/89   11/16/17 128/85                                                                                                                                                                                              .  HYPERLIPIDEMIA - Patient has a long history of significant Hyperlipidemia requiring medication for treatment with recent good control. Patient reports no problems or side effects with the medication.        LDL Cholesterol Calculated   Date Value Ref Range Status   11/16/2017 54 <100 mg/dL Final     Comment:     Desirable:       <100 mg/dl   11/17/2016 67 <100 mg/dL Final     Comment:     Desirable:       <100 mg/dl                                                  CLL: likely diagnosis, undergoing work-up for this new diagnosis. feels well. CT scan showed LAD and splenomegaly as well as 7.6 cm infrarenal AA  WBC   Date Value Ref Range Status   11/21/2017 52.4 () 4.0 - 11.0 10e9/L Final     Comment:     This result has been called to MICHELLE CARRILLO IN ENDO  by Beth Kam on   11 21 2017 at 1011, and has been read back.                                                                                                     .        MEDICAL HISTORY:                                                    Patient Active Problem List    Diagnosis Date Noted     Abdominal aortic aneurysm (AAA) without rupture (H) 11/29/2017     Priority: Medium     CLL (chronic lymphocytic leukemia) (H) 11/17/2017     Priority: Medium     Leukocytosis 11/16/2017     Priority: Medium     BCC (basal cell carcinoma), face 10/23/2015     Priority: Medium     Excised 10/22/15. R temporal area.       Skin lesion of back 10/23/2015     Priority: Medium     Pre-cancerous. Excised 10/22/15       Overweight - BMI >35 10/22/2015     Priority: Medium     Esophageal reflux 11/26/2012     Priority: Medium     Advanced directives, counseling/discussion 12/06/2011     Priority: Medium     9/16/14 Advance Directive invitation mailed to patient. FOUZIA Dutton RN    Advance Directive Problem List Overview:   Name Relationship Phone    Primary Health Care Agent            Alternative Health Care  Agent          Discussed advance care planning with patient; information given to patient to review. 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 Provider to review       Dysfunction of eustachian tube 07/31/2003     Priority: Medium      Past Medical History:   Diagnosis Date     Diaphragmatic hernia without mention of obstruction or gangrene      Diverticulitis of colon (without mention of hemorrhage)(562.11)      Esophageal reflux      Irritable bowel syndrome      Macular degeneration (senile) of retina, unspecified      Squamous Cell Carcinoma, Back 1988     Type II or unspecified type diabetes mellitus without mention of complication, not stated as uncontrolled      Unspecified essential hypertension      Past Surgical History:   Procedure Laterality Date     APPENDECTOMY  1966     BONE MARROW BIOPSY, BONE SPECIMEN, NEEDLE/TROCAR N/A 11/21/2017    Procedure: BIOPSY BONE MARROW;  BONE MARROW BIOPSY;  Surgeon: Shady Garcia MD;  Location:  GI     COLONOSCOPY       SURGICAL HISTORY OF -   1985-90    Squamous cell skin cancer resection - back     SURGICAL HISTORY OF -   1998    skin tags resection     SURGICAL HISTORY OF -   2/2001    stress thall. normal     SURGICAL HISTORY OF -   5/2002    colonoscopy     SURGICAL HISTORY OF -   2007    Fistulotomy with marsupialization for repair of fisture in ano     Current Outpatient Prescriptions   Medication Sig Dispense Refill  "    glimepiride (AMARYL) 1 MG tablet Take 1 tablet (1 mg) by mouth every morning (before breakfast) 90 tablet 3     metFORMIN (GLUCOPHAGE) 1000 MG tablet Take 1 tablet (1,000 mg) by mouth 2 times daily (with meals) 180 tablet 3     losartan-hydrochlorothiazide (HYZAAR) 50-12.5 MG per tablet Take 1 tablet by mouth daily 90 tablet 3     atorvastatin (LIPITOR) 20 MG tablet Take 1 tablet (20 mg) by mouth daily 90 tablet 3     blood glucose monitoring (ONE TOUCH ULTRA) test strip Test 2-3 times daily as directed, brand per insurance formulary. 100 each 0     albuterol (PROAIR HFA/PROVENTIL HFA/VENTOLIN HFA) 108 (90 BASE) MCG/ACT Inhaler Inhale 2 puffs into the lungs every 6 hours as needed for shortness of breath / dyspnea or wheezing (Patient not taking: Reported on 12/1/2017) 1 Inhaler 0     Blood Glucose Monitoring Suppl (ONE TOUCH ULTRA 2) W/DEVICE KIT Use to test blood sugars 2-3 times daily as directed. Brand per insurance formulary   1 kit 0     ASPIRIN 81 MG OR TABS ONE DAILY 100 3     MULTI-VITAMIN OR TABS 1 po qd       OTC products: None, except as noted above    Allergies   Allergen Reactions     Ace Inhibitors      cough      Latex Allergy: NO    Social History   Substance Use Topics     Smoking status: Former Smoker     Packs/day: 0.50     Years: 20.00     Quit date: 1/1/1975     Smokeless tobacco: Former User     Alcohol use 6.0 - 8.4 oz/week     10 - 14 Standard drinks or equivalent per week      Comment: occ     History   Drug Use No       REVIEW OF SYSTEMS:                                                    Constitutional, neuro, ENT, endocrine, pulmonary, cardiac, gastrointestinal, genitourinary, musculoskeletal, integument and psychiatric systems are negative, except as otherwise noted.      EXAM:                                                    /68  Pulse 96  Temp 97.7  F (36.5  C) (Oral)  Resp 20  Ht 5' 8\" (1.727 m)  Wt 213 lb 14.4 oz (97 kg)  BMI 32.52 kg/m2    GENERAL APPEARANCE: " healthy, alert and no distress     EYES: EOMI,  PERRL     HENT: ear canals and TM's normal and nose and mouth without ulcers or lesions     NECK: + cervical LAD, nontender     RESP: lungs clear to auscultation - no rales, rhonchi or wheezes     CV: regular rates and rhythm, normal S1 S2, no S3 or S4 and no murmur, click or rub     ABDOMEN:  soft, nontender, + mild splenomegaly appreciated, no pain; normal bowel sounds normal; no rebound or guarding     MS: extremities normal- no gross deformities noted, no evidence of inflammation in joints, FROM in all extremities.     SKIN: no suspicious lesions or rashes; cap refill < 3 seconds.      NEURO: Normal strength and tone, sensory exam grossly normal, mentation intact and speech normal     PSYCH: mentation appears normal. and affect normal/bright         DIAGNOSTICS:                                                    EKG: Normal Sinus Rhythm, normal axis, normal intervals, no acute ST/T changes c/w ischemia, no LVH by voltage criteria, unchanged from previous tracings    Recent Labs   Lab Test  11/21/17   0945  11/16/17   0804  04/11/17   0741  11/17/16   0731   HGB  9.9*  10.4*   --    --    PLT  132*  115*   --    --    NA   --   141   --   141   POTASSIUM   --   4.1   --   4.0   CR   --   1.19   --   1.13   A1C   --   6.3*  6.7*   --       Results for orders placed or performed during the hospital encounter of 11/29/17   CT Chest/Abdomen/Pelvis w Contrast   Result Value Ref Range    Radiologist flags 7.6 cm infrarenal abdominal aortic aneurysm with (Urgent)     Narrative    CT CHEST/ABDOMEN/PELVIS WITH CONTRAST  11/29/2017 8:19 AM     HISTORY: Evaluate for lymphadenopathy and organomegaly. CLL (chronic  lymphocytic leukemia) staging.    TECHNIQUE: Axial images from the thoracic inlet to the symphysis are  performed with additional coronal reformatted images. 100 mL of Isovue  370 are given intravenously.  Radiation dose for this scan was reduced  using automated  exposure control, adjustment of the mA and/or kV  according to patient size, or iterative reconstruction technique. Oral  contrast is also given.    FINDINGS:   Chest: Possible minor fissure lymph node on series 3, image 45 along  the right minor fissure measuring 0.3 cm. Anterior right upper lobe  nodule measures 0.3 cm on image 40. A lateral right lower lobe nodule  measures 0.4 cm on image 47. Bibasilar atelectasis is noted. No  infiltrate or consolidation. No pleural or pericardial fluid. Heart is  normal in size. Esophagus is unremarkable. A few small mediastinal and  hilar lymph nodes are present, but none exceed size criteria for  abnormality. Bilateral axillary lymph nodes are also present. The  largest is in the left axilla measuring 1.7 x 1.1 cm on series 2,  image 18. Coronary artery calcification is present. Aorta demonstrates  scattered calcified plaque without aneurysm.    Abdomen: A large infrarenal abdominal aortic aneurysm is noted  measuring 7.6 x 6.7 cm on series 2, image 80 and contains a moderate  amount of mural thrombus. There is slight haziness around the margin  of the aneurysm sac of uncertain significance as a few scattered lymph  nodes are noted in this area. Periaortic inflammation is possible. No  obvious retroperitoneal hemorrhage at this time. A periaortic node on  series 2, image 76 near the superior aspect of the aneurysm measures  1.9 x 1.4 cm. Aneurysm does not extend down into the common iliac  arteries. Probable calcified gallstone in the gallbladder on series 2,  image 69. Gallbladder is otherwise within normal limits. The liver,  pancreas, adrenal glands and kidneys are unremarkable. No  hydronephrosis or renal calculi. Spleen is prominent in size without  focal mass measuring 15.7 cm in AP dimension. Tiny cyst posterior  spleen is noted on series 2, image 64. No evidence of bowel  obstruction or diverticulitis.    Pelvis: The bladder and rectum are unremarkable. No enlarged  pelvic  lymph nodes or free fluid. Bone window examination demonstrates  degenerative spine changes.      Impression    IMPRESSION:  1. Mildly enlarged left axillary lymph node and periaortic lymph nodes  in the retroperitoneum of the abdomen. Spleen is also enlarged.  Findings would be consistent with patient's underlying diagnosis of  CLL. Follow up as clinically warranted.  2. Infrarenal abdominal aortic aneurysm measuring up to 7.6 cm in  diameter. Mild indistinctness of the aneurysm wall could be due to  inflammation. No retroperitoneal hemorrhage is currently evident.  Recommend followup with vascular surgery or interventional radiology  for further assessment.    [Access Center: 7.6 cm infrarenal abdominal aortic aneurysm with  indistinct wall but no retroperitoneal hemorrhage at this time.]    This report will be copied to the [facility] to ensure a  provider acknowledges the finding. Access Center is available Monday  through Friday 8am-3:30 pm.     AIME ROMERO MD       IMPRESSION:                                                    Reason for surgery/procedure: large infrarenal AAA  Diagnosis/reason for consult: preop evaluation     The proposed surgical procedure is considered HIGH risk (vascluar).    REVISED CARDIAC RISK INDEX  The patient has the following serious cardiovascular risks for perioperative complications such as (MI, PE, VFib and 3  AV Block):  High risk surgery (>5% cardiac complication risk)  INTERPRETATION: 1 risks: Class II (low risk - 0.9% complication rate)    The patient has the following additional risks for perioperative complications:    Recentl CLL diagnosis with leukocytosis and mild anemia, aymptomatic        ICD-10-CM    1. Preop general physical exam Z01.818 EKG 12-lead complete w/read - Clinics   2. Abdominal aortic aneurysm (AAA) without rupture (H) I71.4    3. CLL (chronic lymphocytic leukemia) (H) C91.10    4. Hypertension goal BP (blood pressure) < 140/90 I10     5. Type 2 diabetes mellitus without complication, without long-term current use of insulin (H) E11.9        RECOMMENDATIONS:                                                      --Consult hospital rounder / IM to assist post-op medical management as needed     Anemia  Anemia and does not require treatment prior to surgery.  Monitor Hemoglobin postoperatively.      --Patient is to take all scheduled medications on the day of surgery EXCEPT for modifications listed below.    Diabetes Medication Use  -----Hold usual  oral diabetic meds (e.g. Metformin, Actos, Glipizide) while NPO.       Anticoagulant or Antiplatelet Medication Use  ASPIRIN: per surgeon, usually d/c 7 days or more before procedure.         ACE Inhibitor or Angiotensin Receptor Blocker (ARB) Use  Ace inhibitor or Angiotensin Receptor Blocker (ARB) and should HOLD this medication for the 24 hours prior to surgery.        APPROVAL GIVEN to proceed with proposed procedure, without further diagnostic evaluation       Signed Electronically by: Morgan Ochoa MD    Copy of this evaluation report is provided to requesting physician.    [place] Preop Guidelines

## 2017-12-03 ENCOUNTER — ANESTHESIA EVENT (OUTPATIENT)
Dept: SURGERY | Facility: CLINIC | Age: 77
DRG: 269 | End: 2017-12-03
Payer: MEDICARE

## 2017-12-04 ENCOUNTER — APPOINTMENT (OUTPATIENT)
Dept: INTERVENTIONAL RADIOLOGY/VASCULAR | Facility: CLINIC | Age: 77
DRG: 269 | End: 2017-12-04
Attending: SURGERY
Payer: MEDICARE

## 2017-12-04 ENCOUNTER — APPOINTMENT (OUTPATIENT)
Dept: SURGERY | Facility: PHYSICIAN GROUP | Age: 77
End: 2017-12-04
Payer: COMMERCIAL

## 2017-12-04 ENCOUNTER — ANESTHESIA (OUTPATIENT)
Dept: SURGERY | Facility: CLINIC | Age: 77
DRG: 269 | End: 2017-12-04
Payer: MEDICARE

## 2017-12-04 ENCOUNTER — HOSPITAL ENCOUNTER (INPATIENT)
Facility: CLINIC | Age: 77
LOS: 1 days | Discharge: HOME | DRG: 269 | End: 2017-12-05
Attending: SURGERY | Admitting: SURGERY
Payer: MEDICARE

## 2017-12-04 ENCOUNTER — APPOINTMENT (OUTPATIENT)
Dept: GENERAL RADIOLOGY | Facility: CLINIC | Age: 77
DRG: 269 | End: 2017-12-04
Attending: SURGERY
Payer: MEDICARE

## 2017-12-04 DIAGNOSIS — I71.40 ABDOMINAL AORTIC ANEURYSM (AAA) WITHOUT RUPTURE (H): Primary | ICD-10-CM

## 2017-12-04 DIAGNOSIS — I71.40 AAA (ABDOMINAL AORTIC ANEURYSM) (H): ICD-10-CM

## 2017-12-04 LAB
ABO + RH BLD: NORMAL
ABO + RH BLD: NORMAL
BLD GP AB SCN SERPL QL: NORMAL
BLD PROD TYP BPU: NORMAL
BLOOD BANK CMNT PATIENT-IMP: NORMAL
CHOLEST SERPL-MCNC: 119 MG/DL
CREAT SERPL-MCNC: 1.11 MG/DL (ref 0.66–1.25)
GFR SERPL CREATININE-BSD FRML MDRD: 65 ML/MIN/1.7M2
GLUCOSE BLDC GLUCOMTR-MCNC: 172 MG/DL (ref 70–99)
GLUCOSE BLDC GLUCOMTR-MCNC: 172 MG/DL (ref 70–99)
GLUCOSE BLDC GLUCOMTR-MCNC: 202 MG/DL (ref 70–99)
GLUCOSE SERPL-MCNC: 161 MG/DL (ref 70–99)
HBA1C MFR BLD: 6.6 % (ref 4.3–6)
HDLC SERPL-MCNC: 31 MG/DL
HGB BLD-MCNC: 10.7 G/DL (ref 13.3–17.7)
KCT BLD-ACNC: 224 SEC (ref 105–167)
KCT BLD-ACNC: 236 SEC (ref 105–167)
KCT BLD-ACNC: 272 SEC (ref 105–167)
LDLC SERPL CALC-MCNC: 56 MG/DL
NONHDLC SERPL-MCNC: 88 MG/DL
NUM BPU REQUESTED: 2
POTASSIUM SERPL-SCNC: 4.2 MMOL/L (ref 3.4–5.3)
SPECIMEN EXP DATE BLD: NORMAL
TRIGL SERPL-MCNC: 159 MG/DL

## 2017-12-04 PROCEDURE — 34826: CPT | Mod: 62 | Performed by: SURGERY

## 2017-12-04 PROCEDURE — C1769 GUIDE WIRE: HCPCS | Performed by: SURGERY

## 2017-12-04 PROCEDURE — 25000128 H RX IP 250 OP 636: Performed by: SURGERY

## 2017-12-04 PROCEDURE — 99223 1ST HOSP IP/OBS HIGH 75: CPT | Performed by: INTERNAL MEDICINE

## 2017-12-04 PROCEDURE — C1894 INTRO/SHEATH, NON-LASER: HCPCS | Performed by: SURGERY

## 2017-12-04 PROCEDURE — 40000003 IR ABDOMINAL ENDOVASCULAR STENT GRAFT

## 2017-12-04 PROCEDURE — 37000009 ZZH ANESTHESIA TECHNICAL FEE, EACH ADDTL 15 MIN: Performed by: SURGERY

## 2017-12-04 PROCEDURE — 99207 ZZC NO CHARGE VISIT/PATIENT NOT SEEN: CPT | Performed by: PHYSICIAN ASSISTANT

## 2017-12-04 PROCEDURE — 25000125 ZZHC RX 250: Performed by: SURGERY

## 2017-12-04 PROCEDURE — A9270 NON-COVERED ITEM OR SERVICE: HCPCS | Mod: GY | Performed by: SURGERY

## 2017-12-04 PROCEDURE — 25000131 ZZH RX MED GY IP 250 OP 636 PS 637: Mod: GY | Performed by: INTERNAL MEDICINE

## 2017-12-04 PROCEDURE — 27210794 ZZH OR GENERAL SUPPLY STERILE: Performed by: SURGERY

## 2017-12-04 PROCEDURE — 36000069 ZZH SURGERY LEVEL 5 EA 15 ADDTL MIN: Performed by: SURGERY

## 2017-12-04 PROCEDURE — C1768 GRAFT, VASCULAR: HCPCS | Performed by: SURGERY

## 2017-12-04 PROCEDURE — 85018 HEMOGLOBIN: CPT | Performed by: ANESTHESIOLOGY

## 2017-12-04 PROCEDURE — 84132 ASSAY OF SERUM POTASSIUM: CPT | Performed by: ANESTHESIOLOGY

## 2017-12-04 PROCEDURE — 25000128 H RX IP 250 OP 636: Performed by: NURSE ANESTHETIST, CERTIFIED REGISTERED

## 2017-12-04 PROCEDURE — 25000125 ZZHC RX 250: Performed by: NURSE ANESTHETIST, CERTIFIED REGISTERED

## 2017-12-04 PROCEDURE — 80061 LIPID PANEL: CPT | Performed by: ANESTHESIOLOGY

## 2017-12-04 PROCEDURE — 85347 COAGULATION TIME ACTIVATED: CPT

## 2017-12-04 PROCEDURE — 83036 HEMOGLOBIN GLYCOSYLATED A1C: CPT | Performed by: ANESTHESIOLOGY

## 2017-12-04 PROCEDURE — 00000146 ZZHCL STATISTIC GLUCOSE BY METER IP

## 2017-12-04 PROCEDURE — 71010 XR CHEST 1 VW: CPT

## 2017-12-04 PROCEDURE — 36415 COLL VENOUS BLD VENIPUNCTURE: CPT | Performed by: SURGERY

## 2017-12-04 PROCEDURE — 25000132 ZZH RX MED GY IP 250 OP 250 PS 637: Mod: GY | Performed by: SURGERY

## 2017-12-04 PROCEDURE — 40000171 ZZH STATISTIC PRE-PROCEDURE ASSESSMENT III: Performed by: SURGERY

## 2017-12-04 PROCEDURE — 86923 COMPATIBILITY TEST ELECTRIC: CPT | Performed by: SURGERY

## 2017-12-04 PROCEDURE — 25000128 H RX IP 250 OP 636: Performed by: ANESTHESIOLOGY

## 2017-12-04 PROCEDURE — C1887 CATHETER, GUIDING: HCPCS | Performed by: SURGERY

## 2017-12-04 PROCEDURE — 36000067 ZZH SURGERY LEVEL 5 1ST 30 MIN: Performed by: SURGERY

## 2017-12-04 PROCEDURE — 37000008 ZZH ANESTHESIA TECHNICAL FEE, 1ST 30 MIN: Performed by: SURGERY

## 2017-12-04 PROCEDURE — 25000566 ZZH SEVOFLURANE, EA 15 MIN: Performed by: SURGERY

## 2017-12-04 PROCEDURE — 82565 ASSAY OF CREATININE: CPT | Performed by: ANESTHESIOLOGY

## 2017-12-04 PROCEDURE — 71000012 ZZH RECOVERY PHASE 1 LEVEL 1 FIRST HR: Performed by: SURGERY

## 2017-12-04 PROCEDURE — 86850 RBC ANTIBODY SCREEN: CPT | Performed by: SURGERY

## 2017-12-04 PROCEDURE — 34802 ZZC AAA REPAIR,MODULR BIFURCATED PROSTH: CPT | Mod: 62 | Performed by: SURGERY

## 2017-12-04 PROCEDURE — 86900 BLOOD TYPING SEROLOGIC ABO: CPT | Performed by: SURGERY

## 2017-12-04 PROCEDURE — 86901 BLOOD TYPING SEROLOGIC RH(D): CPT | Performed by: SURGERY

## 2017-12-04 PROCEDURE — 34812 OPN FEM ART EXPOS: CPT | Mod: 50 | Performed by: SURGERY

## 2017-12-04 PROCEDURE — 27210995 ZZH RX 272: Performed by: SURGERY

## 2017-12-04 PROCEDURE — 04V03D6 RESTRICTION OF ABDOMINAL AORTA, BIFURCATION, WITH INTRALUMINAL DEVICE, PERCUTANEOUS APPROACH: ICD-10-PCS | Performed by: SURGERY

## 2017-12-04 PROCEDURE — 12000007 ZZH R&B INTERMEDIATE

## 2017-12-04 PROCEDURE — 82947 ASSAY GLUCOSE BLOOD QUANT: CPT | Performed by: ANESTHESIOLOGY

## 2017-12-04 PROCEDURE — C1725 CATH, TRANSLUMIN NON-LASER: HCPCS | Performed by: SURGERY

## 2017-12-04 PROCEDURE — 34825 ZZHC AAA REPR,1ST VESSEL,EXTENSION PROSTH: CPT | Mod: 62 | Performed by: SURGERY

## 2017-12-04 DEVICE — IMPLANTABLE DEVICE: Type: IMPLANTABLE DEVICE | Site: AORTA | Status: FUNCTIONAL

## 2017-12-04 DEVICE — STENT GRAFT ENDURANT II CONTRALATERAL LIMB 16X16X124MM: Type: IMPLANTABLE DEVICE | Site: ILIAC/FEMORALS | Status: FUNCTIONAL

## 2017-12-04 RX ORDER — ONDANSETRON 2 MG/ML
4 INJECTION INTRAMUSCULAR; INTRAVENOUS EVERY 30 MIN PRN
Status: DISCONTINUED | OUTPATIENT
Start: 2017-12-04 | End: 2017-12-04 | Stop reason: HOSPADM

## 2017-12-04 RX ORDER — LIDOCAINE 40 MG/G
CREAM TOPICAL
Status: DISCONTINUED | OUTPATIENT
Start: 2017-12-04 | End: 2017-12-05 | Stop reason: HOSPADM

## 2017-12-04 RX ORDER — HYDROMORPHONE HYDROCHLORIDE 1 MG/ML
.3-.5 INJECTION, SOLUTION INTRAMUSCULAR; INTRAVENOUS; SUBCUTANEOUS
Status: DISCONTINUED | OUTPATIENT
Start: 2017-12-04 | End: 2017-12-05 | Stop reason: HOSPADM

## 2017-12-04 RX ORDER — PROTAMINE SULFATE 10 MG/ML
INJECTION, SOLUTION INTRAVENOUS PRN
Status: DISCONTINUED | OUTPATIENT
Start: 2017-12-04 | End: 2017-12-04

## 2017-12-04 RX ORDER — CEFAZOLIN SODIUM 2 G/100ML
2 INJECTION, SOLUTION INTRAVENOUS
Status: COMPLETED | OUTPATIENT
Start: 2017-12-04 | End: 2017-12-04

## 2017-12-04 RX ORDER — SODIUM CHLORIDE, SODIUM LACTATE, POTASSIUM CHLORIDE, CALCIUM CHLORIDE 600; 310; 30; 20 MG/100ML; MG/100ML; MG/100ML; MG/100ML
INJECTION, SOLUTION INTRAVENOUS CONTINUOUS
Status: DISCONTINUED | OUTPATIENT
Start: 2017-12-04 | End: 2017-12-04 | Stop reason: HOSPADM

## 2017-12-04 RX ORDER — GLYCOPYRROLATE 0.2 MG/ML
INJECTION, SOLUTION INTRAMUSCULAR; INTRAVENOUS PRN
Status: DISCONTINUED | OUTPATIENT
Start: 2017-12-04 | End: 2017-12-04

## 2017-12-04 RX ORDER — CEFAZOLIN SODIUM 2 G/100ML
2 INJECTION, SOLUTION INTRAVENOUS EVERY 8 HOURS
Status: COMPLETED | OUTPATIENT
Start: 2017-12-04 | End: 2017-12-05

## 2017-12-04 RX ORDER — ACETAMINOPHEN 325 MG/1
650 TABLET ORAL EVERY 4 HOURS PRN
Status: DISCONTINUED | OUTPATIENT
Start: 2017-12-04 | End: 2017-12-05 | Stop reason: HOSPADM

## 2017-12-04 RX ORDER — ATORVASTATIN CALCIUM 10 MG/1
20 TABLET, FILM COATED ORAL AT BEDTIME
Status: DISCONTINUED | OUTPATIENT
Start: 2017-12-04 | End: 2017-12-05 | Stop reason: HOSPADM

## 2017-12-04 RX ORDER — BUPIVACAINE HYDROCHLORIDE AND EPINEPHRINE 2.5; 5 MG/ML; UG/ML
INJECTION, SOLUTION INFILTRATION; PERINEURAL PRN
Status: DISCONTINUED | OUTPATIENT
Start: 2017-12-04 | End: 2017-12-04 | Stop reason: HOSPADM

## 2017-12-04 RX ORDER — HYDRALAZINE HYDROCHLORIDE 20 MG/ML
5 INJECTION INTRAMUSCULAR; INTRAVENOUS ONCE
Status: COMPLETED | OUTPATIENT
Start: 2017-12-04 | End: 2017-12-04

## 2017-12-04 RX ORDER — NALOXONE HYDROCHLORIDE 0.4 MG/ML
.1-.4 INJECTION, SOLUTION INTRAMUSCULAR; INTRAVENOUS; SUBCUTANEOUS
Status: DISCONTINUED | OUTPATIENT
Start: 2017-12-04 | End: 2017-12-05 | Stop reason: HOSPADM

## 2017-12-04 RX ORDER — FENTANYL CITRATE 50 UG/ML
50-100 INJECTION, SOLUTION INTRAMUSCULAR; INTRAVENOUS EVERY 5 MIN PRN
Status: DISCONTINUED | OUTPATIENT
Start: 2017-12-04 | End: 2017-12-04 | Stop reason: HOSPADM

## 2017-12-04 RX ORDER — ONDANSETRON 2 MG/ML
INJECTION INTRAMUSCULAR; INTRAVENOUS PRN
Status: DISCONTINUED | OUTPATIENT
Start: 2017-12-04 | End: 2017-12-04

## 2017-12-04 RX ORDER — ONDANSETRON 4 MG/1
4 TABLET, ORALLY DISINTEGRATING ORAL EVERY 30 MIN PRN
Status: DISCONTINUED | OUTPATIENT
Start: 2017-12-04 | End: 2017-12-04 | Stop reason: HOSPADM

## 2017-12-04 RX ORDER — DEXAMETHASONE SODIUM PHOSPHATE 4 MG/ML
INJECTION, SOLUTION INTRA-ARTICULAR; INTRALESIONAL; INTRAMUSCULAR; INTRAVENOUS; SOFT TISSUE PRN
Status: DISCONTINUED | OUTPATIENT
Start: 2017-12-04 | End: 2017-12-04

## 2017-12-04 RX ORDER — MULTIVIT WITH MINERALS/LUTEIN
1 TABLET ORAL DAILY
COMMUNITY
End: 2021-10-13

## 2017-12-04 RX ORDER — VECURONIUM BROMIDE 1 MG/ML
INJECTION, POWDER, LYOPHILIZED, FOR SOLUTION INTRAVENOUS PRN
Status: DISCONTINUED | OUTPATIENT
Start: 2017-12-04 | End: 2017-12-04

## 2017-12-04 RX ORDER — SODIUM CHLORIDE 9 MG/ML
INJECTION, SOLUTION INTRAVENOUS CONTINUOUS
Status: DISCONTINUED | OUTPATIENT
Start: 2017-12-04 | End: 2017-12-05 | Stop reason: HOSPADM

## 2017-12-04 RX ORDER — LIDOCAINE HYDROCHLORIDE 20 MG/ML
INJECTION, SOLUTION INFILTRATION; PERINEURAL PRN
Status: DISCONTINUED | OUTPATIENT
Start: 2017-12-04 | End: 2017-12-04

## 2017-12-04 RX ORDER — HEPARIN SODIUM 1000 [USP'U]/ML
INJECTION, SOLUTION INTRAVENOUS; SUBCUTANEOUS PRN
Status: DISCONTINUED | OUTPATIENT
Start: 2017-12-04 | End: 2017-12-04

## 2017-12-04 RX ORDER — LOSARTAN POTASSIUM AND HYDROCHLOROTHIAZIDE 12.5; 5 MG/1; MG/1
1 TABLET ORAL DAILY
Status: DISCONTINUED | OUTPATIENT
Start: 2017-12-05 | End: 2017-12-05 | Stop reason: HOSPADM

## 2017-12-04 RX ORDER — ALBUTEROL SULFATE 0.83 MG/ML
2.5 SOLUTION RESPIRATORY (INHALATION) EVERY 4 HOURS PRN
Status: DISCONTINUED | OUTPATIENT
Start: 2017-12-04 | End: 2017-12-04 | Stop reason: HOSPADM

## 2017-12-04 RX ORDER — MEPERIDINE HYDROCHLORIDE 25 MG/ML
12.5 INJECTION INTRAMUSCULAR; INTRAVENOUS; SUBCUTANEOUS EVERY 5 MIN PRN
Status: DISCONTINUED | OUTPATIENT
Start: 2017-12-04 | End: 2017-12-04 | Stop reason: HOSPADM

## 2017-12-04 RX ORDER — DEXTROSE MONOHYDRATE 25 G/50ML
25-50 INJECTION, SOLUTION INTRAVENOUS
Status: DISCONTINUED | OUTPATIENT
Start: 2017-12-04 | End: 2017-12-05 | Stop reason: HOSPADM

## 2017-12-04 RX ORDER — MAGNESIUM HYDROXIDE 1200 MG/15ML
LIQUID ORAL PRN
Status: DISCONTINUED | OUTPATIENT
Start: 2017-12-04 | End: 2017-12-04 | Stop reason: HOSPADM

## 2017-12-04 RX ORDER — HYDROMORPHONE HYDROCHLORIDE 2 MG/1
2-4 TABLET ORAL EVERY 4 HOURS PRN
Status: DISCONTINUED | OUTPATIENT
Start: 2017-12-04 | End: 2017-12-05 | Stop reason: HOSPADM

## 2017-12-04 RX ORDER — NEOSTIGMINE METHYLSULFATE 1 MG/ML
VIAL (ML) INJECTION PRN
Status: DISCONTINUED | OUTPATIENT
Start: 2017-12-04 | End: 2017-12-04

## 2017-12-04 RX ORDER — FENTANYL CITRATE 50 UG/ML
25-50 INJECTION, SOLUTION INTRAMUSCULAR; INTRAVENOUS
Status: DISCONTINUED | OUTPATIENT
Start: 2017-12-04 | End: 2017-12-04 | Stop reason: HOSPADM

## 2017-12-04 RX ORDER — GLIMEPIRIDE 1 MG/1
1 TABLET ORAL
Status: DISCONTINUED | OUTPATIENT
Start: 2017-12-05 | End: 2017-12-05 | Stop reason: HOSPADM

## 2017-12-04 RX ORDER — PROPOFOL 10 MG/ML
INJECTION, EMULSION INTRAVENOUS PRN
Status: DISCONTINUED | OUTPATIENT
Start: 2017-12-04 | End: 2017-12-04

## 2017-12-04 RX ORDER — LABETALOL HYDROCHLORIDE 5 MG/ML
10 INJECTION, SOLUTION INTRAVENOUS ONCE
Status: DISCONTINUED | OUTPATIENT
Start: 2017-12-04 | End: 2017-12-04 | Stop reason: HOSPADM

## 2017-12-04 RX ORDER — HYDROMORPHONE HYDROCHLORIDE 1 MG/ML
.3-.5 INJECTION, SOLUTION INTRAMUSCULAR; INTRAVENOUS; SUBCUTANEOUS EVERY 5 MIN PRN
Status: DISCONTINUED | OUTPATIENT
Start: 2017-12-04 | End: 2017-12-04 | Stop reason: HOSPADM

## 2017-12-04 RX ORDER — HYDRALAZINE HYDROCHLORIDE 20 MG/ML
2.5-5 INJECTION INTRAMUSCULAR; INTRAVENOUS EVERY 10 MIN PRN
Status: DISCONTINUED | OUTPATIENT
Start: 2017-12-04 | End: 2017-12-04 | Stop reason: HOSPADM

## 2017-12-04 RX ORDER — NICOTINE POLACRILEX 4 MG
15-30 LOZENGE BUCCAL
Status: DISCONTINUED | OUTPATIENT
Start: 2017-12-04 | End: 2017-12-05 | Stop reason: HOSPADM

## 2017-12-04 RX ORDER — ESMOLOL HYDROCHLORIDE 10 MG/ML
INJECTION INTRAVENOUS PRN
Status: DISCONTINUED | OUTPATIENT
Start: 2017-12-04 | End: 2017-12-04

## 2017-12-04 RX ORDER — SODIUM CHLORIDE, SODIUM LACTATE, POTASSIUM CHLORIDE, CALCIUM CHLORIDE 600; 310; 30; 20 MG/100ML; MG/100ML; MG/100ML; MG/100ML
INJECTION, SOLUTION INTRAVENOUS CONTINUOUS PRN
Status: DISCONTINUED | OUTPATIENT
Start: 2017-12-04 | End: 2017-12-04

## 2017-12-04 RX ORDER — ASPIRIN 325 MG
325 TABLET ORAL DAILY
Status: DISCONTINUED | OUTPATIENT
Start: 2017-12-04 | End: 2017-12-05 | Stop reason: HOSPADM

## 2017-12-04 RX ORDER — FENTANYL CITRATE 50 UG/ML
INJECTION, SOLUTION INTRAMUSCULAR; INTRAVENOUS PRN
Status: DISCONTINUED | OUTPATIENT
Start: 2017-12-04 | End: 2017-12-04

## 2017-12-04 RX ORDER — ALUMINA, MAGNESIA, AND SIMETHICONE 2400; 2400; 240 MG/30ML; MG/30ML; MG/30ML
30 SUSPENSION ORAL EVERY 4 HOURS PRN
Status: DISCONTINUED | OUTPATIENT
Start: 2017-12-04 | End: 2017-12-05 | Stop reason: HOSPADM

## 2017-12-04 RX ADMIN — ASPIRIN 325 MG ORAL TABLET 325 MG: 325 PILL ORAL at 19:11

## 2017-12-04 RX ADMIN — SODIUM CHLORIDE: 9 INJECTION, SOLUTION INTRAVENOUS at 22:34

## 2017-12-04 RX ADMIN — PHENYLEPHRINE HYDROCHLORIDE 50 MCG: 10 INJECTION INTRAVENOUS at 08:29

## 2017-12-04 RX ADMIN — PHENYLEPHRINE HYDROCHLORIDE 100 MCG: 10 INJECTION INTRAVENOUS at 08:03

## 2017-12-04 RX ADMIN — SODIUM CHLORIDE, POTASSIUM CHLORIDE, SODIUM LACTATE AND CALCIUM CHLORIDE: 600; 310; 30; 20 INJECTION, SOLUTION INTRAVENOUS at 07:52

## 2017-12-04 RX ADMIN — SODIUM CHLORIDE, POTASSIUM CHLORIDE, SODIUM LACTATE AND CALCIUM CHLORIDE: 600; 310; 30; 20 INJECTION, SOLUTION INTRAVENOUS at 08:26

## 2017-12-04 RX ADMIN — HYDRALAZINE HYDROCHLORIDE 5 MG: 20 INJECTION INTRAMUSCULAR; INTRAVENOUS at 10:40

## 2017-12-04 RX ADMIN — Medication 10000 UNITS: at 08:44

## 2017-12-04 RX ADMIN — ESMOLOL HYDROCHLORIDE 10 MG: 10 INJECTION, SOLUTION INTRAVENOUS at 09:53

## 2017-12-04 RX ADMIN — ACETAMINOPHEN 650 MG: 325 TABLET, FILM COATED ORAL at 22:34

## 2017-12-04 RX ADMIN — PHENYLEPHRINE HYDROCHLORIDE 50 MCG: 10 INJECTION INTRAVENOUS at 08:20

## 2017-12-04 RX ADMIN — PROPOFOL 200 MG: 10 INJECTION, EMULSION INTRAVENOUS at 07:51

## 2017-12-04 RX ADMIN — GLYCOPYRROLATE 0.8 MG: 0.2 INJECTION, SOLUTION INTRAMUSCULAR; INTRAVENOUS at 09:48

## 2017-12-04 RX ADMIN — FENTANYL CITRATE 100 MCG: 50 INJECTION, SOLUTION INTRAMUSCULAR; INTRAVENOUS at 07:51

## 2017-12-04 RX ADMIN — INSULIN ASPART 2 UNITS: 100 INJECTION, SOLUTION INTRAVENOUS; SUBCUTANEOUS at 19:11

## 2017-12-04 RX ADMIN — VECURONIUM BROMIDE 2 MG: 1 INJECTION, POWDER, LYOPHILIZED, FOR SOLUTION INTRAVENOUS at 08:45

## 2017-12-04 RX ADMIN — LIDOCAINE HYDROCHLORIDE 100 MG: 20 INJECTION, SOLUTION INFILTRATION; PERINEURAL at 07:51

## 2017-12-04 RX ADMIN — PROTAMINE SULFATE 30 MG: 10 INJECTION, SOLUTION INTRAVENOUS at 09:36

## 2017-12-04 RX ADMIN — ONDANSETRON 4 MG: 2 INJECTION INTRAMUSCULAR; INTRAVENOUS at 09:38

## 2017-12-04 RX ADMIN — ROCURONIUM BROMIDE 50 MG: 10 INJECTION INTRAVENOUS at 07:51

## 2017-12-04 RX ADMIN — SODIUM CHLORIDE, POTASSIUM CHLORIDE, SODIUM LACTATE AND CALCIUM CHLORIDE: 600; 310; 30; 20 INJECTION, SOLUTION INTRAVENOUS at 07:21

## 2017-12-04 RX ADMIN — Medication 3000 UNITS: at 08:56

## 2017-12-04 RX ADMIN — CEFAZOLIN SODIUM 2 G: 2 INJECTION, SOLUTION INTRAVENOUS at 07:59

## 2017-12-04 RX ADMIN — NEOSTIGMINE METHYLSULFATE 5 MG: 1 INJECTION INTRAMUSCULAR; INTRAVENOUS; SUBCUTANEOUS at 09:48

## 2017-12-04 RX ADMIN — PHENYLEPHRINE HYDROCHLORIDE 50 MCG: 10 INJECTION INTRAVENOUS at 08:19

## 2017-12-04 RX ADMIN — VECURONIUM BROMIDE 1 MG: 1 INJECTION, POWDER, LYOPHILIZED, FOR SOLUTION INTRAVENOUS at 09:17

## 2017-12-04 RX ADMIN — PHENYLEPHRINE HYDROCHLORIDE 50 MCG: 10 INJECTION INTRAVENOUS at 08:24

## 2017-12-04 RX ADMIN — FENTANYL CITRATE 50 MCG: 50 INJECTION, SOLUTION INTRAMUSCULAR; INTRAVENOUS at 06:45

## 2017-12-04 RX ADMIN — PHENYLEPHRINE HYDROCHLORIDE 100 MCG: 10 INJECTION INTRAVENOUS at 08:11

## 2017-12-04 RX ADMIN — PHENYLEPHRINE HYDROCHLORIDE 100 MCG: 10 INJECTION INTRAVENOUS at 08:04

## 2017-12-04 RX ADMIN — PHENYLEPHRINE HYDROCHLORIDE 0.25 MCG/KG/MIN: 10 INJECTION, SOLUTION INTRAMUSCULAR; INTRAVENOUS; SUBCUTANEOUS at 08:30

## 2017-12-04 RX ADMIN — ATORVASTATIN CALCIUM 20 MG: 10 TABLET, FILM COATED ORAL at 22:31

## 2017-12-04 RX ADMIN — MIDAZOLAM HYDROCHLORIDE 2 MG: 1 INJECTION, SOLUTION INTRAMUSCULAR; INTRAVENOUS at 07:45

## 2017-12-04 RX ADMIN — SODIUM CHLORIDE: 9 INJECTION, SOLUTION INTRAVENOUS at 13:35

## 2017-12-04 RX ADMIN — CEFAZOLIN SODIUM 2 G: 2 INJECTION, SOLUTION INTRAVENOUS at 16:31

## 2017-12-04 RX ADMIN — DEXAMETHASONE SODIUM PHOSPHATE 4 MG: 4 INJECTION, SOLUTION INTRA-ARTICULAR; INTRALESIONAL; INTRAMUSCULAR; INTRAVENOUS; SOFT TISSUE at 08:06

## 2017-12-04 RX ADMIN — ESMOLOL HYDROCHLORIDE 20 MG: 10 INJECTION, SOLUTION INTRAVENOUS at 09:52

## 2017-12-04 ASSESSMENT — PAIN DESCRIPTION - DESCRIPTORS: DESCRIPTORS: ACHING

## 2017-12-04 NOTE — IP AVS SNAPSHOT
[facility]    [address] [place] [address]    Phone:  [phone]                                       After Visit Summary   12/4/2017    Austin Garza    MRN: [id number]           After Visit Summary Signature Page     I have received my discharge instructions, and my questions have been answered. I have discussed any challenges I see with this plan with the nurse or doctor.    ..........................................................................................................................................  Patient/Patient Representative Signature      ..........................................................................................................................................  Patient Representative Print Name and Relationship to Patient    ..................................................               ................................................  Date                                            Time    ..........................................................................................................................................  Reviewed by Signature/Title    ...................................................              ..............................................  Date                                                            Time

## 2017-12-04 NOTE — BRIEF OP NOTE
[facility] Brief Operative Note    Pre-operative diagnosis: 7.6 CM ABDOMINAL AORTIC ANUERYSM    Post-operative diagnosis Same   Procedure: Procedure(s):  ENDOVASCULAR REPAIR ANEURYSM ABDOMINAL AORTA - Wound Class: I-Clean   Surgeon(s): Surgeon(s) and Role:     * Milton Cazares MD - Primary     * Alberto Verma MD - Assisting     * Betty Caicedo DO - Assisting     * Miriam Mock MD - Resident - Assisting   Estimated blood loss: 50 mL    Specimens: * No specimens in log *

## 2017-12-04 NOTE — IP AVS SNAPSHOT
MRN:[id number]                      After Visit Summary   12/4/2017    Austin Garza    MRN: [id number]           Thank you!     Thank you for choosing [facility] for your care. Our goal is always to provide you with excellent care. Hearing back from our patients is one way we can continue to improve our services. Please take a few minutes to complete the written survey that you may receive in the mail after you visit with us. Thank you!        Patient Information     Date Of Birth          1940        Designated Caregiver       Most Recent Value    Caregiver    Will someone help with your care after discharge? yes    Name of designated caregiver Marcie    Phone number of caregiver [phone]    Caregiver address [place]      About your hospital stay     You were admitted on:  December 4, 2017 You last received care in the:  [place] [address] [facility]    You were discharged on:  December 5, 2017        Reason for your hospital stay       EVAR                  Who to Call     For medical emergencies, please call 911.  For non-urgent questions about your medical care, please call your primary care provider or clinic, [phone]  For questions related to your surgery, please call your surgery clinic        Attending Provider     Provider Specialty    Milton Cazares MD Vascular Surgery       Primary Care Provider Office Phone # Fax #    Sandi Eastman [phone]      After Care Instructions     Activity       Your activity upon discharge: activity as tolerated no heavy lifting for 1 week.            Diet       Follow this diet upon discharge: Regular diet            Wound care and dressings       Instructions to care for your wound at home: keep wound clean and dry and may get incision wet in shower but do not soak or scrub.                  Follow-up Appointments     Follow-up and recommended labs and tests        Follow up with Dr. Cazares , at  (location with clinic name or city) Vascular surgery clinic, within 1-2 weeks  to evaluate after surgery. No follow up labs or test are needed. Repeat CTA in ~1 month.                  Your next 10 appointments already scheduled     Dec 19, 2017  3:00 PM CST   New Visit with JENNIFER Arias   [facility]    [address]  [facility] [address]   [phone]            Jan 04, 2018  9:45 AM CST   Return Visit with Breana Perry MD   [facility] ([facility])    [facility]  [address]  [facility] [id number] [phone]              Further instructions from your care team       Endovascular Abdominal Aortic Aneurysm Repair  Post-Procedure Instructions    Incision Care  You will have an incision in one or both groins.        You may shower 2-3 days after your surgery - pat the incision dry to prevent irritation from moisture.        You may develop bruising and firmness near your incision.  This will soften and resolve over the next 1-3 weeks.  Call our office if it enlarges, becomes red, or painful.      On occasion a soft, fluid-filled bulge can develop near a surgical incision - this is called a seroma.  It will likely resolve on its own, but occasionally requires your doctor to drain it in clinic.  You should call our office if you have questions about this.      You may notice numbness around your incision.  This is due to nerve irritation from the surgery and will gradually improve over the next several weeks.    Activity    Walking is important after surgery.  You will begin walking before you leave the hospital, and then you should gradually increase your distance as tolerated.  You should be taking at least 3-4 short walks a day.      You may climb stairs when you feel strong enough.      You should not drive for 1-2 weeks or while taking prescription  strength pain medication.   You must feel strong enough to drive safely.      You may return to work once you feel ready - this can be decided at your 2 week post-operative visit.      You should avoid strenuous activity, including running, biking, or weight-lifting until discussed at your post-operative appointment.    Diet    You may resume a regular diet before you leave the hospital.  You may find that it is best to try smaller, more frequent meals until your appetite returns.        Medications    Resume all previous medications as prior to surgery, unless otherwise instructed.    See further information regarding medications containing Metformin  Special Instructions    Carry your stent graft card with you at all times; there may be restrictions pertaining to MRI testing for one month from the date of your procedure.      If you will be having an invasive procedure, such as a colonoscopy or dental work, within one year of your surgery, you will need to take an antibiotic prior to the procedure.  Please call us so we can assist you with this.    Follow-Up Appointments    You will need to see your surgeon 10-14 days after your procedure.  o Please call [phone] to schedule this appointment.  o Dr. Debora dave Location: [facility], [address] [place] [address], [address]      You will also see either your surgeon or your Interventional Radiologist 1 month after your procedure and annually thereafter.  We will arrange for you to have a CT angiogram prior to these appointments and will notify you by mail when you are due to schedule.    When to Call our Office      Temperature greater than 101.      If you notice new onset of numbness, tingling, coolness or pain in your leg.      Severe pain, especially if it is new and preventing sleep.    Call our main number, [phone], for assistance with any concerns or questions.        Pending Results     No orders found for last 3 day(s).            Statement of  "Approval     Ordered          12/05/17 0646  I have reviewed and agree with all the recommendations and orders detailed in this document.  EFFECTIVE NOW     Approved and electronically signed by:  Lejeune, Stacey, MD             Admission Information     Date & Time Provider Department Dept. Phone    12/4/2017 Milton Cazares MD [place] [address] [facility] [phone]      Your Vitals Were     Blood Pressure Pulse Temperature Respirations Height Weight    152/81 (BP Location: Left arm) 98 98.4  F (36.9  C) (Oral) 16 1.727 m (5' 8\") 95.2 kg (209 lb 14.1 oz)    Pulse Oximetry BMI (Body Mass Index)                95% 31.91 kg/m2          MyChart Information     [employer] gives you secure access to your electronic health record. If you see a primary care provider, you can also send messages to your care team and make appointments. If you have questions, please call your primary care clinic.  If you do not have a primary care provider, please call [phone] and they will assist you.        Care EveryWhere ID     This is your Care EveryWhere ID. This could be used by other organizations to access your [place] medical records  [id number]        Equal Access to Services     BAKARI OSMAN : Hadii gertrude Valencia, waaxda luchuyadaha, qaybta kaalmada gloda, mina early. So [facility] [phone].    ATENCIÓN: Si habla español, tiene a edward disposición servicios gratuitos de asistencia lingüística. Anika al [phone].    We comply with applicable federal civil rights laws and [place] laws. We do not discriminate on the basis of race, color, national origin, age, disability, sex, sexual orientation, or gender identity.               Review of your medicines      CONTINUE these medicines which may have CHANGED, or have new prescriptions. If we are uncertain of the size of tablets/capsules you have at home, strength may be listed as something that might have changed.  "       Dose / Directions    aspirin 325 MG tablet   This may have changed:    - medication strength  - how much to take   Used for:  Abdominal aortic aneurysm (AAA) without rupture (H)        Dose:  325 mg   Take 1 tablet (325 mg) by mouth every evening   Quantity:  90 tablet   Refills:  3         CONTINUE these medicines which have NOT CHANGED        Dose / Directions    blood glucose monitoring meter device kit   Used for:  Type II or unspecified type diabetes mellitus without mention of complication, not stated as uncontrolled        Use to test blood sugars 2-3 times daily as directed. Brand per insurance formulary   Quantity:  1 kit   Refills:  0       blood glucose monitoring test strip   Commonly known as:  ONETOUCH ULTRA   Used for:  Type 2 diabetes mellitus without complication, without long-term current use of insulin (H)        Test 2-3 times daily as directed, brand per insurance formulary.   Quantity:  100 each   Refills:  0       CENTRUM SILVER per tablet        Dose:  1 tablet   Take 1 tablet by mouth daily   Refills:  0       glimepiride 1 MG tablet   Commonly known as:  AMARYL   Used for:  Type 2 diabetes mellitus without complication, without long-term current use of insulin (H)        Dose:  1 mg   Take 1 tablet (1 mg) by mouth every morning (before breakfast)   Quantity:  90 tablet   Refills:  3       LIPITOR PO        Dose:  20 mg   Take 20 mg by mouth At Bedtime   Refills:  0       losartan-hydrochlorothiazide 50-12.5 MG per tablet   Commonly known as:  HYZAAR   Used for:  Hypertension goal BP (blood pressure) < 140/90        Dose:  1 tablet   Take 1 tablet by mouth daily   Quantity:  90 tablet   Refills:  3       metFORMIN 1000 MG tablet   Commonly known as:  GLUCOPHAGE   Used for:  Type 2 diabetes mellitus without complication, without long-term current use of insulin (H)        Dose:  1000 mg   Take 1 tablet (1,000 mg) by mouth 2 times daily (with meals)   Quantity:  180 tablet   Refills:  3             Where to get your medicines      Some of these will need a paper prescription and others can be bought over the counter. Ask your nurse if you have questions.     You don't need a prescription for these medications     aspirin 325 MG tablet                Protect others around you: Learn how to safely use, store and throw away your medicines at www.disposemymeds.org.             Medication List: This is a list of all your medications and when to take them. Check marks below indicate your daily home schedule. Keep this list as a reference.      Medications           Morning Afternoon Evening Bedtime As Needed    aspirin 325 MG tablet   Take 1 tablet (325 mg) by mouth every evening   Last time this was given:  325 mg on 12/5/2017 10:11 AM   Next Dose Due:  12/06                                   blood glucose monitoring meter device kit   Use to test blood sugars 2-3 times daily as directed. Brand per insurance formulary                                blood glucose monitoring test strip   Commonly known as:  ONETOUCH ULTRA   Test 2-3 times daily as directed, brand per insurance formulary.                                CENTRUM SILVER per tablet   Take 1 tablet by mouth daily   Next Dose Due:  12/06                                   glimepiride 1 MG tablet   Commonly known as:  AMARYL   Take 1 tablet (1 mg) by mouth every morning (before breakfast)   Last time this was given:  1 mg on 12/5/2017 10:11 AM   Next Dose Due:  12/06                                   LIPITOR PO   Take 20 mg by mouth At Bedtime   Last time this was given:  20 mg on 12/4/2017 10:31 PM                                   losartan-hydrochlorothiazide 50-12.5 MG per tablet   Commonly known as:  HYZAAR   Take 1 tablet by mouth daily   Last time this was given:  1 tablet on 12/5/2017 10:11 AM   Next Dose Due:  12/06                                   metFORMIN 1000 MG tablet   Commonly known as:  GLUCOPHAGE   Take 1 tablet (1,000 mg) by  mouth 2 times daily (with meals)   Next Dose Due:  12/06

## 2017-12-04 NOTE — ANESTHESIA POSTPROCEDURE EVALUATION
Patient: Austin Garza    Procedure(s):  ENDOVASCULAR REPAIR ANEURYSM ABDOMINAL AORTA - Wound Class: I-Clean    Diagnosis:7.6 CM ABDOMINAL AORTIC ANUERYSM   Diagnosis Additional Information: No value filed.    Anesthesia Type:  General, ETT    Note:  Anesthesia Post Evaluation    Patient location during evaluation: PACU  Patient participation: Able to fully participate in evaluation  Level of consciousness: awake  Pain management: adequate  Airway patency: patent  Cardiovascular status: acceptable  Respiratory status: acceptable  Hydration status: acceptable  PONV: none     Anesthetic complications: None          Last vitals:  Vitals:    12/04/17 1000 12/04/17 1010 12/04/17 1020   BP: (!) 150/102 (!) 160/101 (!) 158/97   Pulse: 92     Resp: 16 11 13   Temp: 36.5  C (97.7  F)     SpO2: 100% 99% 97%         Electronically Signed By: Shira Mccoy MD, MD  December 4, 2017  10:40 AM

## 2017-12-04 NOTE — PROGRESS NOTES
Admission medication history interview status for the 12/4/2017  admission is complete. See EPIC admission navigator for prior to admission medications     Medication history source reliability:Good    Medication history interview source(s):Patient    Medication history resources (including written lists, pill bottles, clinic record):Patient brought a list from home    Primary pharmacy.[facility]    Additional medication history information not noted on PTA med list :None    Time spent in this activity: 40 minutes    Prior to Admission medications    Medication Sig Last Dose Taking? Auth Provider   Atorvastatin Calcium (LIPITOR PO) Take 20 mg by mouth At Bedtime 12/3/2017 at HS Yes Reported, Patient   ASPIRIN PO Take 81 mg by mouth every evening 12/1/2017 at PM Yes Reported, Patient   Multiple Vitamins-Minerals (CENTRUM SILVER) per tablet Take 1 tablet by mouth daily 12/3/2017 at AM Yes Reported, Patient   glimepiride (AMARYL) 1 MG tablet Take 1 tablet (1 mg) by mouth every morning (before breakfast) 12/3/2017 at AM Yes Sandi Eastman MD   metFORMIN (GLUCOPHAGE) 1000 MG tablet Take 1 tablet (1,000 mg) by mouth 2 times daily (with meals) 12/3/2017 at AM Yes Sandi Eastman MD   losartan-hydrochlorothiazide (HYZAAR) 50-12.5 MG per tablet Take 1 tablet by mouth daily 12/4/2017 at 0430 Yes Sandi Eastman MD   blood glucose monitoring (ONE TOUCH ULTRA) test strip Test 2-3 times daily as directed, brand per insurance formulary.   Sandi Eastman MD   Blood Glucose Monitoring Suppl (ONE TOUCH ULTRA 2) W/DEVICE KIT Use to test blood sugars 2-3 times daily as directed. Brand per insurance formulary     Cody Farah MD

## 2017-12-04 NOTE — ANESTHESIA PROCEDURE NOTES
ARTERIAL LINE PROCEDURE NOTE:   Pre-Procedure  Performed by SAQIB COLLIER  Location: pre-op      Pre-Anesthestic Checklist: patient identified, IV checked, risks and benefits discussed, informed consent, monitors and equipment checked and pre-op evaluation    Timeout  Correct Patient: Yes   Correct Procedure: Yes   Correct Site: Yes   Correct Laterality: N/A   Correct Position: Yes   Site Marked: N/A   .   Procedure Documentation      supine  .Skin infiltrated with mL of 1% lidocaine. Injection technique: Seldinger Technique  .  .  Patient Prep;all elements of maximal sterile barrier technique followed, mask, hat, sterile gown, sterile gloves, draped, hand hygiene, chlorhexidine gluconate and isopropyl alcohol    Assessment/Narrative    Catheter: 20 gauge, 1.75 in/4.5 cm quick cath (integral wire)          Arterial waveform: Yes IBP within 10% of NIBP: Yes

## 2017-12-04 NOTE — PROGRESS NOTES
HOSPITALIST CONSULT/ CHART CHECK: For after hours medical cross coverage only.     - For vascular medical concerns during business hours M-F, call the [facility] at [phone] to have the rounding/on call Vascular Medicine (NOT VASCULAR SURGERY) MD paged.  - After business hours M-F, for medical concerns on this patient, please page hospitalist staff.  - For vascular surgical questions, please page the appropriate surgeon (primary vascular surgeon or on call vascular surgeon) based upon the time of day.     JoAnna Barthell, PA-C  Pager: [phone]  12/4/2017   12:11 PM

## 2017-12-04 NOTE — ANESTHESIA CARE TRANSFER NOTE
Patient: Austin Garza    Procedure(s):  ENDOVASCULAR REPAIR ANEURYSM ABDOMINAL AORTA - Wound Class: I-Clean    Diagnosis: 7.6 CM ABDOMINAL AORTIC ANUERYSM   Diagnosis Additional Information: No value filed.    Anesthesia Type:   General, ETT     Note:  Airway :Face Mask  Patient transferred to:PACU  Comments: Pt to PACU, spont resp, alert/awake, following commands, vss. Report to RNHandoff Report: Identifed the Patient, Identified the Reponsible Provider, Reviewed the pertinent medical history, Discussed the surgical course, Reviewed Intra-OP anesthesia mangement and issues during anesthesia, Set expectations for post-procedure period and Allowed opportunity for questions and acknowledgement of understanding      Vitals: (Last set prior to Anesthesia Care Transfer)    CRNA VITALS  12/4/2017 0927 - 12/4/2017 1005      12/4/2017             Resp Rate (observed): 14    EKG: Sinus rhythm                Electronically Signed By: GISELL Gilbert CRNA  December 4, 2017  10:05 AM

## 2017-12-04 NOTE — OP NOTE
Vascular Surgery Operative Note    PREOPERATIVE DIAGNOSIS:  EVAR for 7.6 cm infra-renal AAA in patient with CLL    POSTOPERATIVE DIAGNOSIS:  EVAR for 7.6 cm infra-renal AAA in patient with CLL    PROCEDURE:   Ultrasound guided access of bilateral common femoral arteries and deployment of Proglide Perclose x 4  Abdominal aortogram  Percutaneous endovascular aneurysm repair with medtronic E2S 28mm main body graft  Contralateral (Right) iliac limb deployment 16 x 16 x 124 mm  Ipsilateral (Left) iliac limb deployment 16 x 16 x 124 mm  Balloon angioplasty with Reliant balloon of the proximal neck, overlapping graft and both iliac limbs  Completion abdominal aortogram    SURGEON:  Milton Cazares MD    ASSISTANTS:    MD Betty Medrano MD Nicole Lyons - Surgical resident    ANESTHESIA:  General    ESTIMATED BLOOD LOSS:  50mL    Heparin:  10,000U IV with an additional 3,000U IV.   30 mg protamine.    Fluoro time:  12 mins and 9 secs  Dose: 1575 mGy  Contrast 75 mL of visipaque    INDICATIONS:  76 yo male who presented for work up regarding possible diabetes in Nov 2017 and he was found to have a WBC > 50k.  He subsequently underwent biopsy confirming chronic lymphocytic leukemia.  CT scan of the abdomen and pelvis found that he had a 7.6 cm infra-renal abdominal aortic aneurysm.  This was unknown prior to this incidental finding.  He was completely asymptomatic.  The patient was referred to vascular surgery for urgent repair.  The risks, benefits and alternatives were discussed with the patient including open repair.  It was deemed that he met criteria for endovascular repair of his aneurysm which he was consented for with his wife present.      INTRAOPERATIVE FINDINGS:  Appeared about 2 cm of neck on abdominal aortogram, but at least 15 mm of reasonable neck below the renal arteries with good parallax adjustment.  No evidence of endoleak at completion angiogram and great filling of bilateral renal  arteries and hypogastric arteries.   Prior to start of the case he had palpable right DP and PT 2+ pulses and on the left palpable 2+ PT pulse with a monophasic DP on the left by hand held doppler.   Same findings at completion of the case on pulse examination.    DESCRIPTION OF TECHNIQUE:  The patient was brought into the operating room and laid on the operating table in the supine position.  He was prepped and draped from the Xyphoid to the knee bilaterally in the usual sterile fashion with both arms tucked at his sides.  The anesthesiologist were successful with intubation.   A time out was performed and the entire surgical team was in agreement with the planned procedure.  Ancef 2 grams was given with in one hour of incision.   I proceeded to access both common femoral arteries with an 18 gauge needle under ultrasound guidance and using the Seldinger technique two 6 Marshallese sheaths were inserted.   Over a Bentson wire we were able to place a total of 4 proglide closure devices, 2 in each femoral artery with success.  These were then hidden with clamps under a wet blue towel during the case.  Bilateral 8 Marshallese sheaths were in place after completion of placement of our proglide devices.  A 260 cm Lunderquist was placed up the left side and positions in the descending aorta at the level of the subclavian artery with position marked on the table.   A 65 cm marker pigtail catheter was advanced up the right side and the 28 mm E2S graft was advanced up the patient's left side under fluoroscopic guidance over a 260 cm Lunderquist wire.  Using magnification and adjusting for parallax we obtained a good view of the renal arteries and aortic neck.  The graft was partially deployed and a second angiogram confirmed good position.  The graft mainbody was deployed to the gate and then supra-renal stents were deployed for proximal fixation.   The gate was then cannulated through the contralateral access with a Kumpe and 180 cm  angled glidewire.  We confirmed that we were within the graft by spinning a pigtail catheter without wire easily.  A Lunderquist wire was then placed through the pigtail catheter and a pelvic angiogram was performed with MAYELA direction to view the right hypogastric artery origin.   A 16 x 16 x 124 mm limb was advanced into place and deployed into the right common iliac artery.  We then finished deployment of the main body graft and successfully recaptured the tip and delivery device and this was removed and replaced by a 16 Fr dry seal sheath.  A marker pigtail was placed in the left limb and using VITALY orientation we obtained a good pelvic angiogram displaying the left hypogastric artery origin.  A 16 x 16 x 124 mm limb was then advanced and deployed successfully in the left common iliac artery.  Using a reliant balloon we were able to balloon the proximal graft and all of the overlap areas including the entire iliac limb bilaterally for good apposition.  The stiff wires were then removed leaving a pig tail and the reliant balloon in place at the aortic neck.  A final abdominal aortogram was performed that showed no evidence of endoleak while aspirating with 60 mL syringes from each groin sheath.   There was good filling of hypogastric arteries bilaterally and both renal arteries.  The catheters were removed over stiff wires and the arterial access sites were closed with the previously placed perclose proglide sutures.  30 mg of protamine was given IV and 10 minutes of pressure was held in each groin site.  The skin incisions were closed with interrupted 4-0 Monocryl in a deep dermal fashion and the skin and subcutaneous tissue was infiltrated with 20 total mL of 0.25% marcaine.  At the end of the case all needle, sponge and instrument counts were correct.  The patient was extubated in stable condition and taken back to the post operative care unit for recovery and monitoring.    Milton Cazares MD  Vascular Surgery

## 2017-12-04 NOTE — CONSULTS
VASCULAR MEDICINE CONSULTATION      DATE OF SERVICE:  12/04/2017     For vascular medical concerns on this patient during business hours M-F, call the [facility] at [phone] to have the rounding / on call Vascular Medicine (NOT VASCULAR SURGERY) MD paged. After business hours M-F,  for medical concerns on this patient, please page hospitalist staff. For vascular surgical questions on this patient , please page the appropriate surgeon (primary vascular surgeon or on call vascular surgeon) based upon the time of day.       PRIMARY CARE PHYSICIAN:  Dr. Randolph Ochoa.      REQUESTING PHYSICIAN:  Milton Cazares MD      REASON FOR CONSULTATION:  Hyperlipidemia and hyperglycemia evaluation and management in a 77-year-old white male incidentally discovered to have a 76 mm AAA who is currently postop day zero from endovascular aneurysm repair which proceeded uneventfully.  The patient is known to be a type 2 diabetic      IMPRESSION:     1.  Postop day zero endovascular aneurysm repair 76 mm infrarenal AAA.      The patient had an uneventful endovascular aneurysm repair.  He presently has 3+ palpable pulses distally in his bilateral lower extremities.  He will be admitted to the floor.  He will be ambulated.  Hopefully, we will be able to remove his Siddiqui catheter later today.  If he is doing well, he should be able to be discharged tomorrow.  This will obviously be reevaluated in the morning on postop day 1.       2.  Hyperlipidemia with LDL goal of less than 70.      By virtue of his AAA and his history of known type 2 diabetes, he should be at an LDL goal of less than 70.  While taking Lipitor 20 mg daily, his LDL is 56.  No further management changes are warranted at present.     3.  Type 2 diabetes with hemoglobin A1c goal of less than 7%.      His A1c is 6.6.  He takes glimepiride and metformin.  He just received contrast dye with his endovascular aneurysm repair.  At present, I recommend the  following:     a.  Hold metformin for 48 hours.   b.  Moderate consistent carbohydrate diet.     c.  Medium insulin resistance sliding scale coverage.     4.  Chronic lymphocytic leukemia.      The patient's white count is 52,000.  Long-term management of this will be determined by Dr. Perry of Hematology/Oncology.      CHIEF COMPLAINT:  Asymptomatic unruptured 6 mm infrarenal AAA.      HISTORY OF PRESENT ILLNESS:  Austin Garza is a 77-year-old white male who on 11/29/2017 CT of the chest, abdomen and pelvis was incidentally discovered to have a 76 mm unruptured asymptomatic AAA.  He was evaluated for suitability for endovascular repair.  He was felt to be an anatomical candidate for the above.  He proceeded with the above referenced repair uneventfully earlier today.      REVIEW OF SYSTEMS:  The patient denies chest pain, shortness of breath, nausea, vomiting, diarrhea, abdominal pain, pelvic pain, back pain, leg numbness or cool extremities.  Remainder of his 14-point review of systems within normal limits.      PAST MEDICAL HISTORY:   1.  Type 2 diabetes.   2.  Squamous cell carcinoma back, status post excision 1988.   3.  Macular degeneration.   4.  Irritable bowel syndrome.   5.  Gastroesophageal reflux disease.   6.  Diverticulitis.   7.  Diaphragmatic hernia.      PREVIOUS SURGICAL HISTORY:   1.  Squamous cell resection 1988.   2.  Skin tag resection 1998.   3.  Colonoscopy 2002.   4.  Fistulotomy with marsupialization for repair of anal fissure 2007.   5.  Bone marrow biopsy 11/21/2017 positive CLL.   6.  Appendectomy 1966.      FAMILY HISTORY:  Notable for hypertension, skin cancer, glaucoma and coronary disease in his mother, his father had 2 strokes.  Remainder of family history is noncontributory.      SOCIAL HISTORY:  The patient has a 10-pack-year tobacco use history, which he quit in 1975.  He is retired.  He and his wife Marcie are .      MEDICATIONS:  Prior to admission:     1.  Lipitor 20 mg  daily.   2.  Amaryl 1 mg daily.   3.  Hyzaar 50/12.5 mg daily.   4.  Metformin 1 gram p.o. b.i.d.   5.  Aspirin 81 mg daily.      ALLERGIES:  ACE inhibitors cause cough.        PHYSICAL EXAMINATION:   GENERAL:  Currently, patient is awake, alert and oriented postoperatively.   VITAL SIGNS:  His blood pressure is 139/86, respirations are 16, pulse ox is 97% on room air, heart rate is 88.   HEENT:  Oropharynx within normal limits.   NECK:  Reveals no JVD, thyromegaly, lymphadenopathy.   LUNGS:  Clear to auscultation bilaterally without rales, wheezes or rhonchi.   HEART:  Regular rate and rhythm, normal S1, S2, no S3, S4, murmur, gallop or rub.   ABDOMEN:  Normoactive bowel sounds, soft, nondistended, nontender.   EXTREMITIES:  Without cyanosis, clubbing or edema.  The patient has 3+ palpable DP and PT pulses distally.   NEUROLOGIC:  Nonfocal.   RHEUMATOLOGIC:  Reveals no lymphadenopathy.      LABORATORY DATA:  CT scan chest, abdomen and pelvis reveals 76 mm AAA.  A1c is 6.6.  LDL is 56.      PLAN:  Please see recommendations as above.         HA DESAI MD             D: 2017 10:49   T: 2017 11:06   MT: DARION      Name:     MARK MORA   MRN:      [id number]        Account:       [id number]   :      1940           Consult Date:  2017      Document: [id number]       cc: Milton Cazares MD

## 2017-12-04 NOTE — ANESTHESIA PREPROCEDURE EVALUATION
Anesthesia Evaluation     . Pt has had prior anesthetic.     No history of anesthetic complications          ROS/MED HX    ENT/Pulmonary:      (-) sleep apnea   Neurologic:       Cardiovascular:     (+) Dyslipidemia, hypertension----. : . . . :. .       METS/Exercise Tolerance:     Hematologic:         Musculoskeletal:         GI/Hepatic:     (+) GERD hiatal hernia, Inflammatory bowel disease,       Renal/Genitourinary:         Endo:     (+) type II DM Obesity, .      Psychiatric:         Infectious Disease:         Malignancy:         Other:                                    Anesthesia Plan      History & Physical Review  History and physical reviewed and following examination; no interval change.    ASA Status:  3 .    NPO Status:  > 8 hours    Plan for General and ETT with Intravenous induction. Maintenance will be Balanced.    PONV prophylaxis:  Ondansetron (or other 5HT-3) and Dexamethasone or Solumedrol  Additional equipment: Arterial Line      Postoperative Care  Postoperative pain management:  IV analgesics and Oral pain medications.      Consents  Anesthetic plan, risks, benefits and alternatives discussed with:  Patient..                        Procedure: Procedure(s):  ENDOVASCULAR REPAIR ANEURYSM ABDOMINAL AORTA  Preop diagnosis: 7.6 CM ABDOMINAL AORTIC ANUERYSM     Allergies   Allergen Reactions     Ace Inhibitors      cough     Past Medical History:   Diagnosis Date     Diaphragmatic hernia without mention of obstruction or gangrene      Diverticulitis of colon (without mention of hemorrhage)(562.11)      Esophageal reflux      Irritable bowel syndrome      Macular degeneration (senile) of retina, unspecified      Squamous Cell Carcinoma, Back 1988     Type II or unspecified type diabetes mellitus without mention of complication, not stated as uncontrolled      Unspecified essential hypertension      Past Surgical History:   Procedure Laterality Date     APPENDECTOMY  1966     BONE MARROW BIOPSY,  BONE SPECIMEN, NEEDLE/TROCAR N/A 11/21/2017    Procedure: BIOPSY BONE MARROW;  BONE MARROW BIOPSY;  Surgeon: Shady Garcia MD;  Location:  GI     COLONOSCOPY       SURGICAL HISTORY OF -   1985-90    Squamous cell skin cancer resection - back     SURGICAL HISTORY OF -   1998    skin tags resection     SURGICAL HISTORY OF -   2/2001    stress thall. normal     SURGICAL HISTORY OF -   5/2002    colonoscopy     SURGICAL HISTORY OF -   2007    Fistulotomy with marsupialization for repair of fisture in ano     Social History   Substance Use Topics     Smoking status: Former Smoker     Packs/day: 0.50     Years: 20.00     Quit date: 1/1/1975     Smokeless tobacco: Former User     Alcohol use 6.0 - 8.4 oz/week     10 - 14 Standard drinks or equivalent per week      Comment: 2 drinks a week     Prior to Admission medications    Medication Sig Start Date End Date Taking? Authorizing Provider   Atorvastatin Calcium (LIPITOR PO) Take 20 mg by mouth At Bedtime   Yes Reported, Patient   ASPIRIN PO Take 81 mg by mouth every evening   Yes Reported, Patient   Multiple Vitamins-Minerals (CENTRUM SILVER) per tablet Take 1 tablet by mouth daily   Yes Reported, Patient   glimepiride (AMARYL) 1 MG tablet Take 1 tablet (1 mg) by mouth every morning (before breakfast) 11/16/17  Yes Sandi Eastman MD   metFORMIN (GLUCOPHAGE) 1000 MG tablet Take 1 tablet (1,000 mg) by mouth 2 times daily (with meals) 11/16/17  Yes Sandi Eastman MD   losartan-hydrochlorothiazide (HYZAAR) 50-12.5 MG per tablet Take 1 tablet by mouth daily 11/16/17  Yes Sandi Eastman MD   blood glucose monitoring (ONE TOUCH ULTRA) test strip Test 2-3 times daily as directed, brand per insurance formulary. 10/20/17   Sandi Eastman MD   Blood Glucose Monitoring Suppl (ONE TOUCH ULTRA 2) W/DEVICE KIT Use to test blood sugars 2-3 times daily as directed. Brand per insurance formulary   8/15/12   Cody Farah MD     Current Facility-Administered  Medications Ordered in Epic   Medication Dose Route Frequency Last Rate Last Dose     lidocaine 1 % 1 mL  1 mL Other Q1H PRN         lactated ringers infusion   Intravenous Continuous         No current [facility]-ordered outpatient prescriptions on file.       lactated ringers       Wt Readings from Last 1 Encounters:   12/04/17 93.9 kg (207 lb)     Temp Readings from Last 1 Encounters:   12/04/17 36.3  C (97.4  F)     BP Readings from Last 6 Encounters:   12/04/17 139/86   12/01/17 130/68   12/01/17 122/78   11/29/17 123/75   11/21/17 129/68   11/16/17 130/89     Pulse Readings from Last 4 Encounters:   12/01/17 96   12/01/17 72   11/29/17 96   11/16/17 92     Resp Readings from Last 1 Encounters:   12/04/17 16     SpO2 Readings from Last 1 Encounters:   12/04/17 97%     Recent Labs   Lab Test  11/16/17   0804  11/17/16   0731   NA  141  141   POTASSIUM  4.1  4.0   CHLORIDE  107  105   CO2  25  26   ANIONGAP  9  10   GLC  139*  132*   BUN  29  20   CR  1.19  1.13   MAGDALENO  9.0  9.0     Recent Labs   Lab Test  11/16/17   0804  10/23/15   0903   AST  15  16   ALT  25  34   ALKPHOS  55  53   BILITOTAL  0.6  0.3     Recent Labs   Lab Test  11/21/17   0945  11/16/17   0804   WBC  52.4*  61.7*   HGB  9.9*  10.4*   PLT  132*  115*     No results for input(s): ABO, RH in the last 29606 hours.  No results for input(s): INR, PTT in the last 32921 hours.   No results for input(s): TROPI in the last 59898 hours.  No results for input(s): PH, PCO2, PO2, HCO3 in the last 51293 hours.  No results for input(s): HCG in the last 76784 hours.  Recent Results (from the past 744 hour(s))   CT Chest/Abdomen/Pelvis w Contrast   Result Value    Radiologist flags 7.6 cm infrarenal abdominal aortic aneurysm with (Urgent)    Narrative    CT CHEST/ABDOMEN/PELVIS WITH CONTRAST  11/29/2017 8:19 AM     HISTORY: Evaluate for lymphadenopathy and organomegaly. CLL (chronic  lymphocytic leukemia) staging.    TECHNIQUE: Axial images from the thoracic inlet  to the symphysis are  performed with additional coronal reformatted images. 100 mL of Isovue  370 are given intravenously.  Radiation dose for this scan was reduced  using automated exposure control, adjustment of the mA and/or kV  according to patient size, or iterative reconstruction technique. Oral  contrast is also given.    FINDINGS:   Chest: Possible minor fissure lymph node on series 3, image 45 along  the right minor fissure measuring 0.3 cm. Anterior right upper lobe  nodule measures 0.3 cm on image 40. A lateral right lower lobe nodule  measures 0.4 cm on image 47. Bibasilar atelectasis is noted. No  infiltrate or consolidation. No pleural or pericardial fluid. Heart is  normal in size. Esophagus is unremarkable. A few small mediastinal and  hilar lymph nodes are present, but none exceed size criteria for  abnormality. Bilateral axillary lymph nodes are also present. The  largest is in the left axilla measuring 1.7 x 1.1 cm on series 2,  image 18. Coronary artery calcification is present. Aorta demonstrates  scattered calcified plaque without aneurysm.    Abdomen: A large infrarenal abdominal aortic aneurysm is noted  measuring 7.6 x 6.7 cm on series 2, image 80 and contains a moderate  amount of mural thrombus. There is slight haziness around the margin  of the aneurysm sac of uncertain significance as a few scattered lymph  nodes are noted in this area. Periaortic inflammation is possible. No  obvious retroperitoneal hemorrhage at this time. A periaortic node on  series 2, image 76 near the superior aspect of the aneurysm measures  1.9 x 1.4 cm. Aneurysm does not extend down into the common iliac  arteries. Probable calcified gallstone in the gallbladder on series 2,  image 69. Gallbladder is otherwise within normal limits. The liver,  pancreas, adrenal glands and kidneys are unremarkable. No  hydronephrosis or renal calculi. Spleen is prominent in size without  focal mass measuring 15.7 cm in AP  dimension. Tiny cyst posterior  spleen is noted on series 2, image 64. No evidence of bowel  obstruction or diverticulitis.    Pelvis: The bladder and rectum are unremarkable. No enlarged pelvic  lymph nodes or free fluid. Bone window examination demonstrates  degenerative spine changes.      Impression    IMPRESSION:  1. Mildly enlarged left axillary lymph node and periaortic lymph nodes  in the retroperitoneum of the abdomen. Spleen is also enlarged.  Findings would be consistent with patient's underlying diagnosis of  CLL. Follow up as clinically warranted.  2. Infrarenal abdominal aortic aneurysm measuring up to 7.6 cm in  diameter. Mild indistinctness of the aneurysm wall could be due to  inflammation. No retroperitoneal hemorrhage is currently evident.  Recommend followup with vascular surgery or interventional radiology  for further assessment.    [Access Center: 7.6 cm infrarenal abdominal aortic aneurysm with  indistinct wall but no retroperitoneal hemorrhage at this time.]    This report will be copied to the [facility] to ensure a  provider acknowledges the finding. Access Center is available Monday  through Friday 8am-3:30 pm.     AIME ROMERO MD       RECENT LABS:   ECG:   ECHO:

## 2017-12-04 NOTE — PROGRESS NOTES
I've discussed the natural history of disease and expectations with EVAR repair with the patient and his wife at the bedside.  Risks and benefits, including alternatives were discussed in detail.  These were also previously discussed by their account.  Risks include: vessel injury, anesthesia complications, bleeding, need for open surgery, endoleak, kidney injury, infection, and lower extremity ischemia.  They've expressed that this has all been a lot to handle in the setting of recent diagnosis of CLL.  The wife has also said that she has a mental health appointment today at 3pm to continue therapy for handling all the new medical issues.  Consent was signed and they are in agreement with proceeding with EVAR.      Milton Cazares MD  Vascular Surgery

## 2017-12-04 NOTE — PLAN OF CARE
Problem: Patient Care Overview  Goal: Plan of Care/Patient Progress Review  Received patient from pacu. A/O. Denies pain. On 2L oxygen per n.c. Cap. 37 Oxy 93% I 9. On bedrest. Instructed to keep legs straight and still. HOB elevated 15 degrees. IVF's. VSS. Groin sites c/d/i. CMS intact. Neuro sign intact. Siddiqui patent.

## 2017-12-05 VITALS
DIASTOLIC BLOOD PRESSURE: 81 MMHG | SYSTOLIC BLOOD PRESSURE: 152 MMHG | OXYGEN SATURATION: 95 % | BODY MASS INDEX: 31.81 KG/M2 | RESPIRATION RATE: 16 BRPM | HEART RATE: 98 BPM | TEMPERATURE: 98.4 F | HEIGHT: 68 IN | WEIGHT: 209.88 LBS

## 2017-12-05 LAB
ANION GAP SERPL CALCULATED.3IONS-SCNC: 7 MMOL/L (ref 3–14)
BUN SERPL-MCNC: 10 MG/DL (ref 7–30)
CALCIUM SERPL-MCNC: 7.8 MG/DL (ref 8.5–10.1)
CHLORIDE SERPL-SCNC: 109 MMOL/L (ref 94–109)
CO2 SERPL-SCNC: 26 MMOL/L (ref 20–32)
CREAT SERPL-MCNC: 1.08 MG/DL (ref 0.66–1.25)
GFR SERPL CREATININE-BSD FRML MDRD: 67 ML/MIN/1.7M2
GLUCOSE BLDC GLUCOMTR-MCNC: 132 MG/DL (ref 70–99)
GLUCOSE BLDC GLUCOMTR-MCNC: 154 MG/DL (ref 70–99)
GLUCOSE BLDC GLUCOMTR-MCNC: 177 MG/DL (ref 70–99)
GLUCOSE SERPL-MCNC: 123 MG/DL (ref 70–99)
POTASSIUM SERPL-SCNC: 3.7 MMOL/L (ref 3.4–5.3)
SODIUM SERPL-SCNC: 142 MMOL/L (ref 133–144)

## 2017-12-05 PROCEDURE — 00000146 ZZHCL STATISTIC GLUCOSE BY METER IP

## 2017-12-05 PROCEDURE — 99207 ZZC NO CHARGE VISIT/PATIENT NOT SEEN: CPT | Performed by: PHYSICIAN ASSISTANT

## 2017-12-05 PROCEDURE — 99233 SBSQ HOSP IP/OBS HIGH 50: CPT | Performed by: INTERNAL MEDICINE

## 2017-12-05 PROCEDURE — 36415 COLL VENOUS BLD VENIPUNCTURE: CPT | Performed by: INTERNAL MEDICINE

## 2017-12-05 PROCEDURE — 25000132 ZZH RX MED GY IP 250 OP 250 PS 637: Mod: GY | Performed by: SURGERY

## 2017-12-05 PROCEDURE — 25000128 H RX IP 250 OP 636: Performed by: SURGERY

## 2017-12-05 PROCEDURE — A9270 NON-COVERED ITEM OR SERVICE: HCPCS | Mod: GY | Performed by: SURGERY

## 2017-12-05 PROCEDURE — 80048 BASIC METABOLIC PNL TOTAL CA: CPT | Performed by: INTERNAL MEDICINE

## 2017-12-05 RX ORDER — ASPIRIN 325 MG
325 TABLET ORAL EVERY EVENING
Qty: 90 TABLET | Refills: 3 | Status: ON HOLD | COMMUNITY
Start: 2017-12-05 | End: 2019-03-11

## 2017-12-05 RX ADMIN — ASPIRIN 325 MG ORAL TABLET 325 MG: 325 PILL ORAL at 10:11

## 2017-12-05 RX ADMIN — INSULIN ASPART 1 UNITS: 100 INJECTION, SOLUTION INTRAVENOUS; SUBCUTANEOUS at 13:51

## 2017-12-05 RX ADMIN — LOSARTAN POTASSIUM AND HYDROCHLOROTHIAZIDE 1 TABLET: 50; 12.5 TABLET, FILM COATED ORAL at 10:11

## 2017-12-05 RX ADMIN — INSULIN ASPART 1 UNITS: 100 INJECTION, SOLUTION INTRAVENOUS; SUBCUTANEOUS at 10:11

## 2017-12-05 RX ADMIN — CEFAZOLIN SODIUM 2 G: 2 INJECTION, SOLUTION INTRAVENOUS at 00:23

## 2017-12-05 RX ADMIN — GLIMEPIRIDE 1 MG: 1 TABLET ORAL at 10:11

## 2017-12-05 ASSESSMENT — PAIN DESCRIPTION - DESCRIPTORS: DESCRIPTORS: ACHING

## 2017-12-05 NOTE — DISCHARGE INSTRUCTIONS
Endovascular Abdominal Aortic Aneurysm Repair  Post-Procedure Instructions    Incision Care  You will have an incision in one or both groins.        You may shower 2-3 days after your surgery - pat the incision dry to prevent irritation from moisture.        You may develop bruising and firmness near your incision.  This will soften and resolve over the next 1-3 weeks.  Call our office if it enlarges, becomes red, or painful.      On occasion a soft, fluid-filled bulge can develop near a surgical incision - this is called a seroma.  It will likely resolve on its own, but occasionally requires your doctor to drain it in clinic.  You should call our office if you have questions about this.      You may notice numbness around your incision.  This is due to nerve irritation from the surgery and will gradually improve over the next several weeks.    Activity    Walking is important after surgery.  You will begin walking before you leave the hospital, and then you should gradually increase your distance as tolerated.  You should be taking at least 3-4 short walks a day.      You may climb stairs when you feel strong enough.      You should not drive for 1-2 weeks or while taking prescription strength pain medication.   You must feel strong enough to drive safely.      You may return to work once you feel ready - this can be decided at your 2 week post-operative visit.      You should avoid strenuous activity, including running, biking, or weight-lifting until discussed at your post-operative appointment.    Diet    You may resume a regular diet before you leave the hospital.  You may find that it is best to try smaller, more frequent meals until your appetite returns.        Medications    Resume all previous medications as prior to surgery, unless otherwise instructed.    See further information regarding medications containing Metformin  Special Instructions    Carry your stent graft card with you at all times; there  may be restrictions pertaining to MRI testing for one month from the date of your procedure.      If you will be having an invasive procedure, such as a colonoscopy or dental work, within one year of your surgery, you will need to take an antibiotic prior to the procedure.  Please call us so we can assist you with this.    Follow-Up Appointments    You will need to see your surgeon 10-14 days after your procedure.  o Please call [phone] to schedule this appointment.  o Dr. Debora dave Location: [facility], [address] [place] [address], [address]      You will also see either your surgeon or your Interventional Radiologist 1 month after your procedure and annually thereafter.  We will arrange for you to have a CT angiogram prior to these appointments and will notify you by mail when you are due to schedule.    When to Call our Office      Temperature greater than 101.      If you notice new onset of numbness, tingling, coolness or pain in your leg.      Severe pain, especially if it is new and preventing sleep.    Call our main number, [phone], for assistance with any concerns or questions.

## 2017-12-05 NOTE — PLAN OF CARE
Problem: Aortic Aneurysm/Dissection Repair (Adult)  Goal: Signs and Symptoms of Listed Potential Problems Will be Absent, Minimized or Managed (Aortic Aneurysm/Dissection Repair)  Signs and symptoms of listed potential problems will be absent, minimized or managed by discharge/transition of care (reference Aortic Aneurysm/Dissection Repair (Adult) CPG).   Outcome: Improving  PVR 25cc

## 2017-12-05 NOTE — PLAN OF CARE
Problem: Patient Care Overview  Goal: Plan of Care/Patient Progress Review  Outcome: No Change  A&OX4. PRN tylenol given for back pain, and discomfort on lower abdomen. Incision site looks CDI. NS at 100ml/hr.Vitals stable. BP once in 160-170s while trying to get up. Sat at the edge of the bed for few mins. BP stable after some time. On 2L/min O2. Capnography continue, Etco2-39, O2 sat 97%, and IPI-9. C/O sensation of bladder full, bladder scan done no residual noted. On clear liquid diet.Siddiqui patent and intact. Will continue to monitor.

## 2017-12-05 NOTE — PROGRESS NOTES
[facility]    Vascular Medicine Progress Note    Date of Service (when I saw the patient): 12/05/2017            Physician Supervisory Attestation:   I have reviewed and discussed with the physician assistant their history, physical and plan and independently interviewed and examined Austin Garza and agree with the plan as stated in the physician assistant note.    POD 1  Doing well.  Palpable pedal pulses.   Heart : RRR, lungs: CTA  Baez out , not voided yet.  Continue lipitor  Asa 325 daily.  F/u with Dr. Cazares    Time spent 35 minutes today.    Bay Wu MD ,[facility]  Vascular Medicine    12/5/2017        Assessment & Plan   1.  Endovascular aneurysm repair of a 76 mm infrarenal AAA 12/4/17       Assessment:   -POD1   -Pain under control.  -Palpable pedal pulses.     Plan:   -Discharge to home later if voiding, pain under control, ambulating, and tolerating normal diet.  -Follow up with Dr. Cazares in 2 weeks.    -Continue Aspirin 325 mg daily.       2.  Hyperlipidemia with LDL goal of less than 70.       Assessment: LDL 56 - at goal  Plan: Continue Lipitor 20 mg daily.     3.  Type 2 diabetes with hemoglobin A1c goal of less than 7%.       Assessment: A1c 6.6% - at goal.  Takes glimepiride and metformin.  He just received contrast dye with his endovascular aneurysm repair.    Plan: Hold metformin for 48 hours. Cover with SSI in hospital.       4.  Chronic lymphocytic leukemia.       Assessment: WBC 52,000.    Plan: Long-term management of this will be determined by Dr. Perry of Hematology/Oncology.       Interval History   Had pain when baez removed for just a short time, but this has now resolved. Has yet to void. Pain under control.     Physical Exam   Temp: 98.9  F (37.2  C) Temp src: Oral BP: 146/88 Pulse: 75 Heart Rate: 75 Resp: 16 SpO2: 98 % O2 Device: Nasal cannula Oxygen Delivery: 2 LPM  Vitals:    12/04/17 0553 12/05/17 0623   Weight: 93.9 kg (207 lb) 95.2 kg (209 lb  14.1 oz)     Vital Signs with Ranges  Temp:  [98  F (36.7  C)-99.1  F (37.3  C)] 98.9  F (37.2  C)  Pulse:  [75-91] 75  Heart Rate:  [75-91] 75  Resp:  [10-19] 16  BP: (128-171)/(77-95) 146/88  MAP:  [112 mmHg-113 mmHg] 112 mmHg  Arterial Line BP: (152-156)/(79-80) 156/79  SpO2:  [92 %-98 %] 98 %  I/O last 3 completed shifts:  In: 3830 [P.O.:480; I.V.:3350]  Out: 2700 [Urine:2650; Blood:50]    Constitutional: Awake, alert, cooperative, no apparent distress, and appears stated age.  Eyes: Lids and lashes normal, sclera clear, conjunctiva normal.  ENT: Normocephalic, without obvious abnormality, atraumatic, oral pharynx with moist mucus membranes  Respiratory: No increased work of breathing, good air exchange, clear to auscultation bilaterally, no crackles or wheezing.  Cardiovascular: Regular rate and rhythm, normal S1 and S2, no S3 or S4, and no murmur noted.  GI: Normal bowel sounds, soft, non-distended, non-tender.  Skin: No bruising or bleeding, normal skin color, texture, turgor, no redness, warmth, or swelling, no rashes. Surgical incisions near groin held together with glue - c/d/i.   Musculoskeletal: There is no redness, warmth, or swelling of the joints.    Neurologic: Awake, alert, oriented to name, place and time.  Cranial nerves II-XII are grossly intact.    Neuropsychiatric: Calm, normal eye contact, alert, normal affect, oriented to self, place, time and situation, memory for past and recent events intact and thought process normal.    Medications     NaCl 100 mL/hr at 12/04/17 2234       atorvastatin (LIPITOR) tablet 20 mg  20 mg Oral At Bedtime     losartan-hydrochlorothiazide  1 tablet Oral Daily     sodium chloride (PF)  3 mL Intracatheter Q8H     aspirin  325 mg Oral Daily     glimepiride  1 mg Oral QAM AC     [START ON 12/6/2017] metFORMIN  1,000 mg Oral BID w/meals     insulin aspart  1-7 Units Subcutaneous TID AC     insulin aspart  1-5 Units Subcutaneous At Bedtime       Data     Recent  Labs  Lab 12/05/17  0755 12/04/17  0625   HGB  --  10.7*     --    POTASSIUM 3.7 4.2   CHLORIDE 109  --    CO2 26  --    BUN 10  --    CR 1.08 1.11   ANIONGAP 7  --    MAGDALENO 7.8*  --    * 161*     No results found for this or any previous visit (from the past 24 hour(s)).

## 2017-12-05 NOTE — DISCHARGE SUMMARY
Physician Discharge Summary     Patient ID:  Austin Garza  [id number]  77 year old  1940    Admit date: 12/4/2017    Discharge date and time: 12/5/2017     Admitting Physician: Milton Cazares MD     Discharge Physician: Dr. Milton Cazares    Admission Diagnoses: 7.6 CM ABDOMINAL AORTIC ANUERYSM   AAA (abdominal aortic aneurysm) (H)    Discharge Diagnoses: Abdominal aortic aneurysm    Admission Condition: good    Discharged Condition: good    Indication for Admission: EVAR    Hospital Course: 78 yo male who presented with >7cm infrarenal AAA for endovascular repair of AAA.  Procedure went as planned and patient was admitted to the surgical floor for post operative monitoring.  His pain was controlled with po pain medications and his diet was advanced as tolerated.  IVF were discontinued and patient was ambulating well.   Overnight on POD#0 patient had some mild oozing from the urethera where the baez catheter was placed. (Difficult OR placement).  There was no active oozing on POD# and urine was clear.  His baez catheter was removed in the morning of POD#1 and he was voiding well independently prior to discharge to home.    Consults: vascular medicine    Significant Diagnostic Studies: labs: routine    Treatments: surgery: 12/4/17 EVAR with bilateral percutaneous access    Disposition: home    Patient Instructions:   Current Discharge Medication List      CONTINUE these medications which have NOT CHANGED    Details   Atorvastatin Calcium (LIPITOR PO) Take 20 mg by mouth At Bedtime      ASPIRIN PO Take 81 mg by mouth every evening      Multiple Vitamins-Minerals (CENTRUM SILVER) per tablet Take 1 tablet by mouth daily      glimepiride (AMARYL) 1 MG tablet Take 1 tablet (1 mg) by mouth every morning (before breakfast)  Qty: 90 tablet, Refills: 3    Associated Diagnoses: Type 2 diabetes mellitus without complication, without long-term current use of insulin (H)      metFORMIN (GLUCOPHAGE) 1000 MG tablet  Take 1 tablet (1,000 mg) by mouth 2 times daily (with meals)  Qty: 180 tablet, Refills: 3    Associated Diagnoses: Type 2 diabetes mellitus without complication, without long-term current use of insulin (H)      losartan-hydrochlorothiazide (HYZAAR) 50-12.5 MG per tablet Take 1 tablet by mouth daily  Qty: 90 tablet, Refills: 3    Associated Diagnoses: Hypertension goal BP (blood pressure) < 140/90      blood glucose monitoring (ONE TOUCH ULTRA) test strip Test 2-3 times daily as directed, brand per insurance formulary.  Qty: 100 each, Refills: 0    Comments: Due for office visit for further refills.  Associated Diagnoses: Type 2 diabetes mellitus without complication, without long-term current use of insulin (H)      Blood Glucose Monitoring Suppl (ONE TOUCH ULTRA 2) W/DEVICE KIT Use to test blood sugars 2-3 times daily as directed. Brand per insurance formulary    Qty: 1 kit, Refills: 0    Associated Diagnoses: Type II or unspecified type diabetes mellitus without mention of complication, not stated as uncontrolled           Activity: no lifting, Driving, or Strenuous exercise for 1 week  Diet: regular diet  Wound Care: keep wound clean and dry    Follow-up with Dr. Cazares in 1 week.    Signed:  Stacey LeJeune  12/5/2017  6:45 AM    I have seen and examined this patient with the Vascular Fellow. I was involved with the assessment and plan, and I agree with the findings and plan of care as documented in the fellow's note.   Milton Cazares MD

## 2017-12-05 NOTE — PROGRESS NOTES
Overall doing well, biggest complaint was oozing near urethra at baez catheter  No active bleeding at present, pain otherwise controlled.    B/P: 132/78, T: 98, P: 90, R: 16  Alert oriented no acute distress  Abd soft, nontender nondistended  Palpable PT bilaterally, mild LE edema  Bilateral groin incisions c/d/i with skin glue    WBC   Date Value Ref Range Status   11/21/2017 52.4 (HH) 4.0 - 11.0 10e9/L Final     Comment:     This result has been called to MICHELLE CARRILLO IN [facility] by Beth Kam on   11 21 2017 at 1011, and has been read back.      ]  Hemoglobin   Date Value Ref Range Status   12/04/2017 10.7 (L) 13.3 - 17.7 g/dL Final   ]  INR/Prothrombin Time  Creatinine   Date Value Ref Range Status   12/04/2017 1.11 0.66 - 1.25 mg/dL Final   ]      Intake/Output Summary (Last 24 hours) at 12/05/17 0646  Last data filed at 12/05/17 0624   Gross per 24 hour   Intake             3830 ml   Output             2700 ml   Net             1130 ml       Assessment:  76 yo male POD#1 s/p EVAR    Plan:  Remove baez catheter, check PVR  Patient ok with OTC pain medications  Ambulate  Diet as tolerated, hep lock IVF  Likely dispo to home today once voiding well independently  Follow up with Dr. Cazares in 1-2 weeks for post operative check.    Jessi Segal MD  Vascular Surgery Fellow  Pager [phone]       I have seen and examined this patient with the Vascular Fellow. I was involved with the assessment and plan, and I agree with the findings and plan of care as documented in the fellow's note.   Milton Cazares MD

## 2017-12-05 NOTE — PROGRESS NOTES
HOSPITALIST CONSULT CHART CHECK:     Hospitalist service was consulted for cross coverage only. We will peripherally follow and chart check throughout the week.      - For vascular medical concerns during business hours M-F, call the [facility] at [phone] to have the rounding/on call Vascular Medicine (NOT VASCULAR SURGERY) MD paged.     - After business hours M-F, for medical concerns on this patient, please page hospitalist staff.     - For vascular surgical questions, please page the appropriate surgeon (primary vascular surgeon or on call vascular surgeon) based upon the time of day.

## 2017-12-05 NOTE — PLAN OF CARE
Problem: Aortic Aneurysm/Dissection Repair (Adult)  Goal: Signs and Symptoms of Listed Potential Problems Will be Absent, Minimized or Managed (Aortic Aneurysm/Dissection Repair)  Signs and symptoms of listed potential problems will be absent, minimized or managed by discharge/transition of care (reference Aortic Aneurysm/Dissection Repair (Adult) CPG).   Outcome: Improving    Discharge to home.Will follow up with PCP in 2 weeks as instructed. Discharge education/medications complete. Transport arranged with wife.

## 2017-12-05 NOTE — PLAN OF CARE
Problem: Aortic Aneurysm/Dissection Repair (Adult)  Goal: Signs and Symptoms of Listed Potential Problems Will be Absent, Minimized or Managed (Aortic Aneurysm/Dissection Repair)  Signs and symptoms of listed potential problems will be absent, minimized or managed by discharge/transition of care (reference Aortic Aneurysm/Dissection Repair (Adult) CPG).  A/O, VSS.  Urine output good per baez.  Tele is SR-First degree block.  IVF infusing as ordered.  Groin sites CDI with liquid bandage.  Rates pain low.

## 2017-12-07 ENCOUNTER — TELEPHONE (OUTPATIENT)
Dept: PEDIATRICS | Facility: CLINIC | Age: 77
End: 2017-12-07

## 2017-12-07 NOTE — TELEPHONE ENCOUNTER
Pt picked paperwork up in clinic on 12/1/17.  Pt was also given soap and showering instructions.    JAMESON JuanN, RN

## 2017-12-07 NOTE — TELEPHONE ENCOUNTER
Pt has an appointment with vascular() on 12/19.    ED Notes:  Activity: no lifting, Driving, or Strenuous exercise for 1 week  Diet: regular diet  Wound Care: keep wound clean and dry     Follow-up with Dr. Cazares in 1 week.    ED / Discharge Outreach Protocol    Patient Contact    Attempt # 1    Was call answered?  No.  Left message on voicemail with information to call me back.    Mayte, RN  Triage Nurse

## 2017-12-07 NOTE — TELEPHONE ENCOUNTER
Please contact patient for In-patient follow up.  [phone] (home)     Visit date: 12/5/17  Diagnosis listed:Aaa (Abdominal Aortic Aneurysm) (H), Abdominal Aortic Aneurysm (Aaa) Without Rupture (H)  Number of visits in past 12 months:1

## 2017-12-07 NOTE — TELEPHONE ENCOUNTER
"ED / Discharge Outreach Protocol    Patient Contact    Attempt # 2    Was call answered?  Yes.  \"May I please speak with <patient name>\"  Is patient available?   Yes    Hospital/TCU/ED for chronic condition Discharge Protocol    \"Hi, my name is Marielena Gutiérrez, a registered nurse, and I am calling from [facility].  I am calling to follow up and see how things are going for you after your recent emergency visit/hospital/TCU stay.\"    Tell me how you are doing now that you are home?\" doing well      Discharge Instructions    \"Let's review your discharge instructions.  What is/are the follow-up recommendations?  Pt. Response: appointment with Dr. Cazares for post-op, already scheduled    \"Has an appointment with your primary care provider been scheduled?\"   No, but we scheduled appointment as patient wants to discuss back pain, driving long distances, care thereafter.    \"When you see the provider, I would recommend that you bring your medications with you.\"    Medications    \"Tell me what changed about your medicines when you discharged?\"    Changes to chronic meds?    0-1  Baby ASA was changed to a regular size    \"What questions do you have about your medications?\"    None     New diagnoses of heart failure, COPD, diabetes, or MI?    No    Medication reconciliation completed? Yes  Was MTM referral placed (*Make sure to put transitions as reason for referral)?   No    Call Summary    \"What questions or concerns do you have about your recent visit and your follow-up care?\"     none    \"If you have questions or things don't continue to improve, we encourage you contact us through the main clinic number (give number).  Even if the clinic is not open, triage nurses are available 24/7 to help you.     We would like you to know that our clinic has extended hours (provide information).  We also have urgent care (provide details on closest location and hours/contact info)\"      \"Thank you for your time and take " "care!\"           "

## 2017-12-08 LAB
BLD PROD TYP BPU: NORMAL
BLD PROD TYP BPU: NORMAL
BLD UNIT ID BPU: 0
BLD UNIT ID BPU: 0
BLOOD PRODUCT CODE: NORMAL
BLOOD PRODUCT CODE: NORMAL
BPU ID: NORMAL
BPU ID: NORMAL
TRANSFUSION STATUS PATIENT QL: NORMAL

## 2017-12-09 NOTE — ANESTHESIA POSTPROCEDURE EVALUATION
Patient: Austin Garza    Procedure(s):  BONE MARROW BIOPSY    Diagnosis:leukocytosis, unspecified type   Diagnosis Additional Information: No value filed.    Anesthesia Type:  MAC    Note:  Anesthesia Post Evaluation    Patient location during evaluation: PACU  Patient participation: Able to fully participate in evaluation  Level of consciousness: awake  Pain management: adequate  Airway patency: patent  Cardiovascular status: acceptable  Respiratory status: acceptable  Hydration status: acceptable  PONV: none     Anesthetic complications: None          Last vitals:  Vitals:    11/21/17 1000 11/21/17 1010 11/21/17 1020   BP: 139/79 143/81 129/68   Resp: (!) 7 11 14   SpO2: 96% 94% 96%         Electronically Signed By: AD ARNOLD MD  December 8, 2017  7:47 PM

## 2017-12-11 ENCOUNTER — OFFICE VISIT (OUTPATIENT)
Dept: PEDIATRICS | Facility: CLINIC | Age: 77
End: 2017-12-11
Payer: COMMERCIAL

## 2017-12-11 VITALS
SYSTOLIC BLOOD PRESSURE: 128 MMHG | DIASTOLIC BLOOD PRESSURE: 68 MMHG | TEMPERATURE: 97.1 F | OXYGEN SATURATION: 98 % | HEIGHT: 68 IN | HEART RATE: 102 BPM | BODY MASS INDEX: 32.07 KG/M2 | WEIGHT: 211.6 LBS

## 2017-12-11 DIAGNOSIS — R35.0 URINARY FREQUENCY: Primary | ICD-10-CM

## 2017-12-11 DIAGNOSIS — C91.10 CHRONIC LYMPHOCYTIC LEUKEMIA (H): ICD-10-CM

## 2017-12-11 DIAGNOSIS — I71.40 ABDOMINAL AORTIC ANEURYSM (AAA) WITHOUT RUPTURE (H): ICD-10-CM

## 2017-12-11 LAB
ALBUMIN UR-MCNC: NEGATIVE MG/DL
APPEARANCE UR: CLEAR
BILIRUB UR QL STRIP: NEGATIVE
COLOR UR AUTO: YELLOW
GLUCOSE UR STRIP-MCNC: 250 MG/DL
HGB UR QL STRIP: ABNORMAL
KETONES UR STRIP-MCNC: ABNORMAL MG/DL
LEUKOCYTE ESTERASE UR QL STRIP: NEGATIVE
NITRATE UR QL: NEGATIVE
PH UR STRIP: 5 PH (ref 5–7)
RBC #/AREA URNS AUTO: NORMAL /HPF
SOURCE: ABNORMAL
SP GR UR STRIP: 1.02 (ref 1–1.03)
UROBILINOGEN UR STRIP-ACNC: 0.2 EU/DL (ref 0.2–1)
WBC #/AREA URNS AUTO: NORMAL /HPF

## 2017-12-11 PROCEDURE — 81001 URINALYSIS AUTO W/SCOPE: CPT | Performed by: INTERNAL MEDICINE

## 2017-12-11 PROCEDURE — 99495 TRANSJ CARE MGMT MOD F2F 14D: CPT | Performed by: INTERNAL MEDICINE

## 2017-12-11 ASSESSMENT — PATIENT HEALTH QUESTIONNAIRE - PHQ9: SUM OF ALL RESPONSES TO PHQ QUESTIONS 1-9: 4

## 2017-12-11 NOTE — LETTER
My Depression Action Plan  Name: Austin Garza   Date of Birth 1940  Date: 12/11/2017    My doctor: Sandi Eastman   My clinic: [address]  [address]  [facility] [address] [phone]          GREEN    ZONE   Good Control    What it looks like:     Things are going generally well. You have normal up s and down s. You may even feel depressed from time to time, but bad moods usually last less than a day.   What you need to do:  1. Continue to care for yourself (see self care plan)  2. Check your depression survival kit and update it as needed  3. Follow your physician s recommendations including any medication.  4. Do not stop taking medication unless you consult with your physician first.           YELLOW         ZONE Getting Worse    What it looks like:     Depression is starting to interfere with your life.     It may be hard to get out of bed; you may be starting to isolate yourself from others.    Symptoms of depression are starting to last most all day and this has happened for several days.     You may have suicidal thoughts but they are not constant.   What you need to do:     1. Call your care team, your response to treatment will improve if you keep your care team informed of your progress. Yellow periods are signs an adjustment may need to be made.     2. Continue your self-care, even if you have to fake it!    3. Talk to someone in your support network    4. Open up your depression survival kit           RED    ZONE Medical Alert - Get Help    What it looks like:     Depression is seriously interfering with your life.     You may experience these or other symptoms: You can t get out of bed most days, can t work or engage in other necessary activities, you have trouble taking care of basic hygiene, or basic responsibilities, thoughts of suicide or death that will not go away, self-injurious behavior.     What you need to do:  1. Call your  care team and request a same-day appointment. If they are not available (weekends or after hours) call your local crisis line, emergency room or 911.      Electronically signed by: Coby Allen, December 11, 2017    Depression Self Care Plan / Survival Kit    Self-Care for Depression  Here s the deal. Your body and mind are really not as separate as most people think.  What you do and think affects how you feel and how you feel influences what you do and think. This means if you do things that people who feel good do, it will help you feel better.  Sometimes this is all it takes.  There is also a place for medication and therapy depending on how severe your depression is, so be sure to consult with your medical provider and/ or Behavioral Health Consultant if your symptoms are worsening or not improving.     In order to better manage my stress, I will:    Exercise  Get some form of exercise, every day. This will help reduce pain and release endorphins, the  feel good  chemicals in your brain. This is almost as good as taking antidepressants!  This is not the same as joining a gym and then never going! (they count on that by the way ) It can be as simple as just going for a walk or doing some gardening, anything that will get you moving.      Hygiene   Maintain good hygiene (Get out of bed in the morning, Make your bed, Brush your teeth, Take a shower, and Get dressed like you were going to work, even if you are unemployed).  If your clothes don't fit try to get ones that do.    Diet  I will strive to eat foods that are good for me, drink plenty of water, and avoid excessive sugar, caffeine, alcohol, and other mood-altering substances.  Some foods that are helpful in depression are: complex carbohydrates, B vitamins, flaxseed, fish or fish oil, fresh fruits and vegetables.    Psychotherapy  I agree to participate in Individual Therapy (if recommended).    Medication  If prescribed medications, I agree to take them.   Missing doses can result in serious side effects.  I understand that drinking alcohol, or other illicit drug use, may cause potential side effects.  I will not stop my medication abruptly without first discussing it with my provider.    Staying Connected With Others  I will stay in touch with my friends, family members, and my primary care provider/team.    Use your imagination  Be creative.  We all have a creative side; it doesn t matter if it s oil painting, sand castles, or mud pies! This will also kick up the endorphins.    Witness Beauty  (AKA stop and smell the roses) Take a look outside, even in mid-winter. Notice colors, textures. Watch the squirrels and birds.     Service to others  Be of service to others.  There is always someone else in need.  By helping others we can  get out of ourselves  and remember the really important things.  This also provides opportunities for practicing all the other parts of the program.    Humor  Laugh and be silly!  Adjust your TV habits for less news and crime-drama and more comedy.    Control your stress  Try breathing deep, massage therapy, biofeedback, and meditation. Find time to relax each day.     My support system    Clinic Contact:  Phone number:    Contact 1:  Phone number:    Contact 2:  Phone number:    Restoration/:  Phone number:    Therapist:  Phone number:    Local crisis center:    Phone number:    Other community support:  Phone number:

## 2017-12-11 NOTE — PROGRESS NOTES
SUBJECTIVE:   Austin Garza is a 77 year old male who presents to clinic today for the following health issues:    Hospital Follow-up Visit:    Hospital/Nursing Home/IP Rehab Facility: [facility]  Date of Admission: 12/4/17  Date of Discharge: 12/5/17  Reason(s) for Admission: abdominal aortic aneurysm            Problems taking medications regularly:  None       Medication changes since discharge: None       Problems adhering to non-medication therapy:  None    Summary of hospitalization:  [facility] discharge summary reviewed  Diagnostic Tests/Treatments reviewed.  Follow up needed: Vascular surgery (scheduled), Heme Onc (scheduled next month)  Other Healthcare Providers Involved in Patient s Care:         Vascular Surgery, Heme/Onc  Update since discharge: improved.     Post Discharge Medication Reconciliation: discharge medications reconciled, continue medications without change.  Plan of care communicated with patient     Coding guidelines for this visit:  Type of Medical   Decision Making Face-to-Face Visit       within 7 Days of discharge Face-to-Face Visit        within 14 days of discharge   Moderate Complexity [id number]   High Complexity [id number]          Austin comes in for follow-up for his recent hospitalization for his endovascular repair of his abdominal aortic aneurysm. He reports that overall he has been doing well. Does have some swelling and bruising around his incision sites. Is a little more fatigued than usual. Would like to be able to start driving again, wants to know when he can start exercising.     Does note that his lower back/SI joints is a bit sore following his procedure. He attributes this to his relative immobilisation in the hospital. Would like to go to his chiropractor, but would like to get clearance for this.     He has also reports that since he had a catheter placed, he has been having some discomfort with urination (although this is improved  since the catheter was removed. He is having increased urinary frequency and dribbling.     Problem list and histories reviewed & adjusted, as indicated.  Additional history: as documented    Patient Active Problem List   Diagnosis     Hearing loss     Dysfunction of eustachian tube     Hypertension goal BP (blood pressure) < 140/90     Diminished Peripheral Pulse     Type 2 diabetes mellitus without complication, without long-term current use of insulin (H)     HYPERLIPIDEMIA LDL GOAL <100     Inflammatory Monoarthritis, Left Hip     Advanced directives, counseling/discussion     Esophageal reflux     Overweight - BMI >35     BCC (basal cell carcinoma), face     Skin lesion of back     Leukocytosis     CLL (chronic lymphocytic leukemia) (H)     Abdominal aortic aneurysm (AAA) without rupture (H)     AAA (abdominal aortic aneurysm) (H)     Past Surgical History:   Procedure Laterality Date     APPENDECTOMY  1966     BONE MARROW BIOPSY, BONE SPECIMEN, NEEDLE/TROCAR N/A 11/21/2017    Procedure: BIOPSY BONE MARROW;  BONE MARROW BIOPSY;  Surgeon: Shady Garcia MD;  Location:  GI     COLONOSCOPY       ENDOVASCULAR REPAIR ANEURYSM ABDOMINAL AORTA N/A 12/4/2017    Procedure: ENDOVASCULAR REPAIR ANEURYSM ABDOMINAL AORTA;  ENDOVASCULAR REPAIR ANEURYSM ABDOMINAL AORTA;  Surgeon: Milton Cazares MD;  Location:  OR     SURGICAL HISTORY OF -   1985-90    Squamous cell skin cancer resection - back     SURGICAL HISTORY OF -   1998    skin tags resection     SURGICAL HISTORY OF -   2/2001    stress thall. normal     SURGICAL HISTORY OF -   5/2002    colonoscopy     SURGICAL HISTORY OF -   2007    Fistulotomy with marsupialization for repair of fisture in ano       Social History   Substance Use Topics     Smoking status: Former Smoker     Packs/day: 0.50     Years: 20.00     Quit date: 1/1/1975     Smokeless tobacco: Former User     Alcohol use 6.0 - 8.4 oz/week     10 - 14 Standard drinks or equivalent per  "week      Comment: 2 drinks a week     Family History   Problem Relation Age of Onset     CEREBROVASCULAR DISEASE Father      x 2     Hypertension Mother      C.A.D. Mother      CANCER Mother      skin     Eye Disorder Mother      glaucoma     Prostate Cancer No family hx of      Cancer - colorectal No family hx of              Reviewed and updated as needed this visit by clinical staffTobacco  Allergies  Meds  Med Hx  Surg Hx  Fam Hx  Soc Hx      ROS:  Constitutional, HEENT, cardiovascular, derm, , psych systems are negative, except as otherwise noted.      OBJECTIVE:   /68 (Cuff Size: Adult Large)  Pulse 102  Temp 97.1  F (36.2  C) (Tympanic)  Ht 5' 8\" (1.727 m)  Wt 211 lb 9.6 oz (96 kg)  SpO2 98%  BMI 32.17 kg/m2  Body mass index is 32.17 kg/(m^2).  GENERAL: healthy, alert and no distress  CV: regular rate and rhythm, normal S1 S2, no S3 or S4, no murmur, click or rub, no peripheral edema and peripheral pulses strong  ABDOMEN: 2 incision sites with bruising and minimal swelling but no evidence of bleeding or infection, non-pulsatile and non-tender. Small amount of bruising just above penis    Diagnostic Test Results:  Results for orders placed or performed in visit on 12/11/17   *UA reflex to Microscopic and Culture ([place] and [facility] (except [place] and [place])   Result Value Ref Range    Color Urine Yellow     Appearance Urine Clear     Glucose Urine 250 (A) NEG^Negative mg/dL    Bilirubin Urine Negative NEG^Negative    Ketones Urine Trace (A) NEG^Negative mg/dL    Specific Gravity Urine 1.020 1.003 - 1.035    Blood Urine Moderate (A) NEG^Negative    pH Urine 5.0 5.0 - 7.0 pH    Protein Albumin Urine Negative NEG^Negative mg/dL    Urobilinogen Urine 0.2 0.2 - 1.0 EU/dL    Nitrite Urine Negative NEG^Negative    Leukocyte Esterase Urine Negative NEG^Negative    Source Midstream Urine    Urine Microscopic   Result Value Ref Range    WBC Urine O - 2 OTO2^O - 2 /HPF    RBC Urine O - " 2 OTO2^O - 2 /HPF       ASSESSMENT/PLAN:     1. Urinary frequency  UA reassuring, some blood on dipstick but none on microscopy. Assume discomfort and symptoms secondary to catheter placement and anticipate that this will resolve in next 1-2 weeks.   - DEPRESSION ACTION PLAN (DAP)  - *UA reflex to Microscopic and Culture ([place] and [facility] (except [place] and [place])  - Urine Microscopic    2. Abdominal aortic aneurysm (AAA) without rupture (H)  S/p repair, incision sites appear stable. Doing well, has follow-up scheduled with vascular next week. OK to start driving 1 week after procedure, he will hold off on visiting chiropractor until cleared by vascular.     3. Chronic lymphocytic leukemia (H)  Stable, following with Heme/Onc. Not on active treatment, may revisit after he recovers from EVAR.       Patient Instructions   Let's check your urine today to make sure that you do not have an infection since you are still having symptoms.    Follow up with your vascular surgeon next week. Ok to start driving tomorrow. No strenuous exercise until you see surgery. Hold off on seeing the chiropractor until after you see the surgeon.    No more than 6 tablets of extra strength acetaminophen per 24 hours.       Sandi Nguyen MD  [facility]

## 2017-12-11 NOTE — NURSING NOTE
"Chief Complaint   Patient presents with     Hospital F/U       Initial /68 (Cuff Size: Adult Large)  Pulse 102  Temp 97.1  F (36.2  C) (Tympanic)  Ht 5' 8\" (1.727 m)  Wt 211 lb 9.6 oz (96 kg)  SpO2 98%  BMI 32.17 kg/m2 Estimated body mass index is 32.17 kg/(m^2) as calculated from the following:    Height as of this encounter: 5' 8\" (1.727 m).    Weight as of this encounter: 211 lb 9.6 oz (96 kg).  Medication Reconciliation: complete   Coby Allen CMA    "

## 2017-12-11 NOTE — PATIENT INSTRUCTIONS
Let's check your urine today to make sure that you do not have an infection since you are still having symptoms.    Follow up with your vascular surgeon next week. Ok to start driving tomorrow. No strenuous exercise until you see surgery. Hold off on seeing the chiropractor until after you see the surgeon.    No more than 6 tablets of extra strength acetaminophen per 24 hours.

## 2017-12-11 NOTE — MR AVS SNAPSHOT
After Visit Summary   12/11/2017    Austin Garza    MRN: [id number]           Patient Information     Date Of Birth          1940        Visit Information        Provider Department      12/11/2017 9:50 AM Sandi Eastman MD [facility] [place]        Today's Diagnoses     Urinary frequency    -  1      Care Instructions    Let's check your urine today to make sure that you do not have an infection since you are still having symptoms.    Follow up with your vascular surgeon next week. Ok to start driving tomorrow. No strenuous exercise until you see surgery. Hold off on seeing the chiropractor until after you see the surgeon.    No more than 6 tablets of extra strength acetaminophen per 24 hours.           Follow-ups after your visit        Your next 10 appointments already scheduled     Dec 19, 2017 10:30 AM CST   Return Visit with Milton Cazares MD   [facility] ([facility])    [address]  [facility] [address] [phone]            Jan 04, 2018  9:45 AM CST   Return Visit with Breana Perry MD   [facility] ([facility])    [facility]  [address]  [facility] [address] [phone]              Who to contact     If you have questions or need follow up information about today's clinic visit or your schedule please contact [facility] [place] directly at [phone].  Normal or non-critical lab and imaging results will be communicated to you by MyChart, letter or phone within 4 business days after the clinic has received the results. If you do not hear from us within 7 days, please contact the clinic through MyChart or phone. If you have a critical or abnormal lab result, we will notify you by phone as soon as possible.  Submit refill requests through [employer] or call your pharmacy and they will forward  "the refill request to us. Please allow 3 business days for your refill to be completed.          Additional Information About Your Visit        [employer] Information     [employer] gives you secure access to your electronic health record. If you see a primary care provider, you can also send messages to your care team and make appointments. If you have questions, please call your primary care clinic.  If you do not have a primary care provider, please call [phone] and they will assist you.        Care EveryWhere ID     This is your Care EveryWhere ID. This could be used by other organizations to access your [place] medical records  [id number]        Your Vitals Were     Pulse Temperature Height Pulse Oximetry BMI (Body Mass Index)       102 97.1  F (36.2  C) (Tympanic) 5' 8\" (1.727 m) 98% 32.17 kg/m2        Blood Pressure from Last 3 Encounters:   12/11/17 128/68   12/05/17 152/81   12/01/17 130/68    Weight from Last 3 Encounters:   12/11/17 211 lb 9.6 oz (96 kg)   12/05/17 209 lb 14.1 oz (95.2 kg)   12/01/17 213 lb 14.4 oz (97 kg)              We Performed the Following     *UA reflex to Microscopic and Culture ([place] [facility] [place] [facility] (except [place] and [place])     DEPRESSION ACTION PLAN (DAP)        Primary Care Provider Office Phone # Fax #    Sandi Eastman [phone] [address] [place] [address]        Equal Access to Services     BÁRBARA OSMAN : Hadii gertrude ku hadasho Soomaali, waaxda luqadaha, qaybta kaalmada oseasegyarand, waxay darby nunez adesheri early. So [facility] [phone].    ATENCIÓN: Si habla español, tiene a edward disposición servicios gratuitos de asistencia lingüística. Llame al [phone].    We comply with applicable federal civil rights laws and [place] laws. We do not discriminate on the basis of race, color, national origin, age, disability, sex, sexual orientation, or gender identity.            Thank you!     Thank you for choosing " [facility] [place]  for your care. Our goal is always to provide you with excellent care. Hearing back from our patients is one way we can continue to improve our services. Please take a few minutes to complete the written survey that you may receive in the mail after your visit with us. Thank you!             Your Updated Medication List - Protect others around you: Learn how to safely use, store and throw away your medicines at www.disposemymeds.org.          This list is accurate as of: 12/11/17 10:31 AM.  Always use your most recent med list.                   Brand Name Dispense Instructions for use Diagnosis    aspirin 325 MG tablet     90 tablet    Take 1 tablet (325 mg) by mouth every evening    Abdominal aortic aneurysm (AAA) without rupture (H)       blood glucose monitoring meter device kit     1 kit    Use to test blood sugars 2-3 times daily as directed. Brand per insurance formulary    Type II or unspecified type diabetes mellitus without mention of complication, not stated as uncontrolled       blood glucose monitoring test strip    ONETOUCH ULTRA    100 each    Test 2-3 times daily as directed, brand per insurance formulary.    Type 2 diabetes mellitus without complication, without long-term current use of insulin (H)       CENTRUM SILVER per tablet      Take 1 tablet by mouth daily        glimepiride 1 MG tablet    AMARYL    90 tablet    Take 1 tablet (1 mg) by mouth every morning (before breakfast)    Type 2 diabetes mellitus without complication, without long-term current use of insulin (H)       LIPITOR PO      Take 20 mg by mouth At Bedtime        losartan-hydrochlorothiazide 50-12.5 MG per tablet    HYZAAR    90 tablet    Take 1 tablet by mouth daily    Hypertension goal BP (blood pressure) < 140/90       metFORMIN 1000 MG tablet    GLUCOPHAGE    180 tablet    Take 1 tablet (1,000 mg) by mouth 2 times daily (with meals)    Type 2 diabetes mellitus without complication, without long-term  current use of insulin (H)

## 2017-12-12 ENCOUNTER — TELEPHONE (OUTPATIENT)
Dept: OTHER | Facility: CLINIC | Age: 77
End: 2017-12-12

## 2017-12-12 NOTE — TELEPHONE ENCOUNTER
Pt is status post ENDOVASCULAR AAA REPAIR WITH [employer] on 12/4/17.   Pt is scheduled for first post op with  on 12/19/17.      LM for pt to call back if he has any concerns related to his surgery.    Radha Castro, JAMESONN, RN

## 2017-12-13 ENCOUNTER — TELEPHONE (OUTPATIENT)
Dept: PEDIATRICS | Facility: CLINIC | Age: 77
End: 2017-12-13

## 2017-12-13 DIAGNOSIS — R05.9 COUGH: Primary | ICD-10-CM

## 2017-12-13 RX ORDER — BENZONATATE 200 MG/1
200 CAPSULE ORAL 3 TIMES DAILY PRN
Qty: 21 CAPSULE | Refills: 0 | Status: SHIPPED | OUTPATIENT
Start: 2017-12-13 | End: 2017-12-18

## 2017-12-13 NOTE — TELEPHONE ENCOUNTER
Please call:  Rx for benzonatate (Tessalon) was sent to [facility].  1 pill up to 3 times per day as needed.  If his cough worsens, he should be seen again in clinic to ensure no additional treatment is needed.

## 2017-12-13 NOTE — TELEPHONE ENCOUNTER
Patient had AAA repair on 12/07/17.  Is requesting medication for cough due to recent surgery.      Please call patient with recommendation  Saw Dr. Eastman for post-op follow-up on Monday.  Yesterday developed a cough, and cough has gotten worse overnight.  Dry cough most of the time.  No fever, no chills.  Denies wheezing or SHORTNESS OF BREATH.  Has been using OTC cough medication.  Ribs are sore from coughing.  Is concerned that incision line will be affected by coughing.    QUIRINO Acevedo RN

## 2017-12-14 ENCOUNTER — OFFICE VISIT (OUTPATIENT)
Dept: PEDIATRICS | Facility: CLINIC | Age: 77
End: 2017-12-14
Payer: COMMERCIAL

## 2017-12-14 ENCOUNTER — RADIANT APPOINTMENT (OUTPATIENT)
Dept: GENERAL RADIOLOGY | Facility: CLINIC | Age: 77
End: 2017-12-14
Attending: NURSE PRACTITIONER
Payer: COMMERCIAL

## 2017-12-14 ENCOUNTER — TELEPHONE (OUTPATIENT)
Dept: PEDIATRICS | Facility: CLINIC | Age: 77
End: 2017-12-14

## 2017-12-14 VITALS
WEIGHT: 212.7 LBS | HEIGHT: 68 IN | TEMPERATURE: 99.8 F | RESPIRATION RATE: 16 BRPM | SYSTOLIC BLOOD PRESSURE: 122 MMHG | OXYGEN SATURATION: 96 % | DIASTOLIC BLOOD PRESSURE: 68 MMHG | BODY MASS INDEX: 32.24 KG/M2 | HEART RATE: 116 BPM

## 2017-12-14 DIAGNOSIS — R06.02 SOB (SHORTNESS OF BREATH): ICD-10-CM

## 2017-12-14 DIAGNOSIS — R79.89 BLOOD CREATININE INCREASED COMPARED WITH PRIOR MEASUREMENT: ICD-10-CM

## 2017-12-14 DIAGNOSIS — C91.10 CLL (CHRONIC LYMPHOCYTIC LEUKEMIA) (H): ICD-10-CM

## 2017-12-14 DIAGNOSIS — R05.9 COUGH: Primary | ICD-10-CM

## 2017-12-14 DIAGNOSIS — D64.9 ANEMIA, UNSPECIFIED TYPE: ICD-10-CM

## 2017-12-14 DIAGNOSIS — R00.0 TACHYCARDIA: ICD-10-CM

## 2017-12-14 PROBLEM — I71.40 AAA (ABDOMINAL AORTIC ANEURYSM) (H): Status: RESOLVED | Noted: 2017-12-04 | Resolved: 2017-12-14

## 2017-12-14 LAB
ANION GAP SERPL CALCULATED.3IONS-SCNC: 10 MMOL/L (ref 3–14)
BUN SERPL-MCNC: 24 MG/DL (ref 7–30)
CALCIUM SERPL-MCNC: 9.4 MG/DL (ref 8.5–10.1)
CHLORIDE SERPL-SCNC: 99 MMOL/L (ref 94–109)
CO2 SERPL-SCNC: 27 MMOL/L (ref 20–32)
CREAT SERPL-MCNC: 1.3 MG/DL (ref 0.66–1.25)
DIFFERENTIAL METHOD BLD: NORMAL
ERYTHROCYTE [DISTWIDTH] IN BLOOD BY AUTOMATED COUNT: 15 % (ref 10–15)
ERYTHROCYTE [DISTWIDTH] IN BLOOD BY AUTOMATED COUNT: NORMAL % (ref 10–15)
GFR SERPL CREATININE-BSD FRML MDRD: 54 ML/MIN/1.7M2
GLUCOSE SERPL-MCNC: 197 MG/DL (ref 70–99)
HCT VFR BLD AUTO: 27.1 % (ref 40–53)
HCT VFR BLD AUTO: NORMAL % (ref 40–53)
HGB BLD-MCNC: 8.5 G/DL (ref 13.3–17.7)
HGB BLD-MCNC: NORMAL G/DL (ref 13.3–17.7)
MCH RBC QN AUTO: 34 PG (ref 26.5–33)
MCH RBC QN AUTO: NORMAL PG (ref 26.5–33)
MCHC RBC AUTO-ENTMCNC: 31.4 G/DL (ref 31.5–36.5)
MCHC RBC AUTO-ENTMCNC: NORMAL G/DL (ref 31.5–36.5)
MCV RBC AUTO: 108 FL (ref 78–100)
MCV RBC AUTO: NORMAL FL (ref 78–100)
PLATELET # BLD AUTO: 145 10E9/L (ref 150–450)
PLATELET # BLD AUTO: NORMAL 10E9/L (ref 150–450)
POTASSIUM SERPL-SCNC: 5.1 MMOL/L (ref 3.4–5.3)
RBC # BLD AUTO: 2.5 10E12/L (ref 4.4–5.9)
RBC # BLD AUTO: NORMAL 10E12/L (ref 4.4–5.9)
SODIUM SERPL-SCNC: 136 MMOL/L (ref 133–144)
WBC # BLD AUTO: 44.8 10E9/L (ref 4–11)
WBC # BLD AUTO: NORMAL 10E9/L (ref 4–11)

## 2017-12-14 PROCEDURE — 80048 BASIC METABOLIC PNL TOTAL CA: CPT | Performed by: NURSE PRACTITIONER

## 2017-12-14 PROCEDURE — 85025 COMPLETE CBC W/AUTO DIFF WBC: CPT | Performed by: NURSE PRACTITIONER

## 2017-12-14 PROCEDURE — 99215 OFFICE O/P EST HI 40 MIN: CPT | Performed by: NURSE PRACTITIONER

## 2017-12-14 PROCEDURE — 93000 ELECTROCARDIOGRAM COMPLETE: CPT | Performed by: NURSE PRACTITIONER

## 2017-12-14 PROCEDURE — 71020 XR CHEST 2 VW: CPT

## 2017-12-14 PROCEDURE — 85027 COMPLETE CBC AUTOMATED: CPT | Mod: 59 | Performed by: NURSE PRACTITIONER

## 2017-12-14 PROCEDURE — 36415 COLL VENOUS BLD VENIPUNCTURE: CPT | Performed by: NURSE PRACTITIONER

## 2017-12-14 PROCEDURE — 87040 BLOOD CULTURE FOR BACTERIA: CPT | Performed by: NURSE PRACTITIONER

## 2017-12-14 RX ORDER — AZITHROMYCIN 250 MG/1
TABLET, FILM COATED ORAL
Qty: 6 TABLET | Refills: 0 | Status: SHIPPED | OUTPATIENT
Start: 2017-12-14 | End: 2018-01-04

## 2017-12-14 NOTE — Clinical Note
Sania Junior,  I've put in bold the items that need to be re-checked Monday. He is a complex patient so please give me any feedback for things you may have done differently. Thanks!  Radha

## 2017-12-14 NOTE — TELEPHONE ENCOUNTER
Please call patient.    He has high heart rate and decreased kidney function. These are all signs of dehydration. They can also be signs of sepsis. He needs to really push fluids (please be explicit) until his urine is light yellow.    He needs to watch for signs of sepsis  -Temp >101  -Weakness, dizziness, decreased urine output  -To the ER for any of these symptoms  -CLL puts him at higher risk for sepsis.    Close f/u with Sandi Monday.

## 2017-12-14 NOTE — NURSING NOTE
"Chief Complaint   Patient presents with     Cough       Initial /68 (BP Location: Right arm, Cuff Size: Adult Large)  Pulse 116  Temp 99.8  F (37.7  C) (Tympanic)  Resp 16  Ht 5' 8\" (1.727 m)  Wt 212 lb 11.2 oz (96.5 kg)  SpO2 96%  BMI 32.34 kg/m2 Estimated body mass index is 32.34 kg/(m^2) as calculated from the following:    Height as of this encounter: 5' 8\" (1.727 m).    Weight as of this encounter: 212 lb 11.2 oz (96.5 kg).  Medication Reconciliation: complete   Coby Allen CMA    "

## 2017-12-14 NOTE — MR AVS SNAPSHOT
After Visit Summary   12/14/2017    Austin Garza    MRN: [id number]           Patient Information     Date Of Birth          1940        Visit Information        Provider Department      12/14/2017 10:40 AM Radha Peoples APRN CNP [facility]        Today's Diagnoses     SOB (shortness of breath)    -  1    Cough          Care Instructions    White cells still high, hemoglobin very low-I'll call oncology today and either myself or Dr. Perry's office will call you with a plan. This may explain the fatigue and shortness of breath.     I'm treating you with an antibiotic just in case, but I don't see any signs of pneumonia.    I'll call the cardiologist to double check that your heart rate seems normal.           Follow-ups after your visit        Your next 10 appointments already scheduled     Dec 19, 2017 10:30 AM CST   Return Visit with Milton Cazares MD   [facility] ([facility])    [address]  [facility] [address] [phone]            Jan 04, 2018  9:45 AM CST   Return Visit with Breana Perry MD   [facility] ([facility])    [facility]  [address]  [place] [address] [phone]              Who to contact     If you have questions or need follow up information about today's clinic visit or your schedule please contact [facility] directly at [phone].  Normal or non-critical lab and imaging results will be communicated to you by MyChart, letter or phone within 4 business days after the clinic has received the results. If you do not hear from us within 7 days, please contact the clinic through MyChart or phone. If you have a critical or abnormal lab result, we will notify you by phone as soon as possible.  Submit refill requests through [employer] or call your pharmacy  "and they will forward the refill request to us. Please allow 3 business days for your refill to be completed.          Additional Information About Your Visit        [employer] Information     [employer] gives you secure access to your electronic health record. If you see a primary care provider, you can also send messages to your care team and make appointments. If you have questions, please call your primary care clinic.  If you do not have a primary care provider, please call [phone] and they will assist you.        Care EveryWhere ID     This is your Care EveryWhere ID. This could be used by other organizations to access your [place] medical records  [id number]        Your Vitals Were     Pulse Temperature Respirations Height Pulse Oximetry BMI (Body Mass Index)    116 99.8  F (37.7  C) (Tympanic) 16 5' 8\" (1.727 m) 96% 32.34 kg/m2       Blood Pressure from Last 3 Encounters:   12/14/17 122/68   12/11/17 128/68   12/05/17 152/81    Weight from Last 3 Encounters:   12/14/17 212 lb 11.2 oz (96.5 kg)   12/11/17 211 lb 9.6 oz (96 kg)   12/05/17 209 lb 14.1 oz (95.2 kg)              We Performed the Following     Basic metabolic panel     CBC with platelets differential     EKG 12-lead complete w/read - Clinics          Today's Medication Changes          These changes are accurate as of: 12/14/17 12:13 PM.  If you have any questions, ask your nurse or doctor.               Start taking these medicines.        Dose/Directions    azithromycin 250 MG tablet   Commonly known as:  ZITHROMAX   Used for:  Cough   Started by:  Radha Peoples APRN CNP        Two tablets first day, then one tablet daily for four days.   Quantity:  6 tablet   Refills:  0            Where to get your medicines      These medications were sent to [id number] - [place], [place] - [address]  [address] [facility] [address]     Phone:  [phone]     azithromycin 250 MG tablet                Primary Care " Provider Office Phone # Fax #    Sandi Eastman [phone] [address] [place] [address]        Equal Access to Services     BÁRBARA DAWSON : Hadviji simon junioro Annie, waalisonda luqadaha, qaybta kaalmada michael, mina nunez oseassheri goff lamakayladeejay sharath. So [facility] [phone].    ATENCIÓN: Si habla español, tiene a edward disposición servicios gratuitos de asistencia lingüística. Llame al [phone].    We comply with applicable federal civil rights laws and [place] laws. We do not discriminate on the basis of race, color, national origin, age, disability, sex, sexual orientation, or gender identity.            Thank you!     [facility]  [facility] your [facility]. Our goal is always to provide you with excellent care. Hearing back from our patients is one way we can continue to improve our services. Please take a few minutes to complete the written survey that you may receive in the mail after your visit with us. Thank you!             Your Updated Medication List - Protect others around you: Learn how to safely use, store and throw away your medicines at www.disposemymeds.org.          This list is accurate as of: 12/14/17 12:13 PM.  Always use your most recent med list.                   Brand Name Dispense Instructions for use Diagnosis    aspirin 325 MG tablet     90 tablet    Take 1 tablet (325 mg) by mouth every evening    Abdominal aortic aneurysm (AAA) without rupture (H)       azithromycin 250 MG tablet    ZITHROMAX    6 tablet    Two tablets first day, then one tablet daily for four days.    Cough       benzonatate 200 MG capsule    TESSALON    21 capsule    Take 1 capsule (200 mg) by mouth 3 times daily as needed for cough    Cough       blood glucose monitoring meter device kit     1 kit    Use to test blood sugars 2-3 times daily as directed. Brand per insurance formulary    Type II or unspecified type diabetes mellitus without mention of  complication, not stated as uncontrolled       blood glucose monitoring test strip    ONETOUCH ULTRA    100 each    Test 2-3 times daily as directed, brand per insurance formulary.    Type 2 diabetes mellitus without complication, without long-term current use of insulin (H)       CENTRUM SILVER per tablet      Take 1 tablet by mouth daily        glimepiride 1 MG tablet    AMARYL    90 tablet    Take 1 tablet (1 mg) by mouth every morning (before breakfast)    Type 2 diabetes mellitus without complication, without long-term current use of insulin (H)       LIPITOR PO      Take 20 mg by mouth At Bedtime        losartan-hydrochlorothiazide 50-12.5 MG per tablet    HYZAAR    90 tablet    Take 1 tablet by mouth daily    Hypertension goal BP (blood pressure) < 140/90       metFORMIN 1000 MG tablet    GLUCOPHAGE    180 tablet    Take 1 tablet (1,000 mg) by mouth 2 times daily (with meals)    Type 2 diabetes mellitus without complication, without long-term current use of insulin (H)

## 2017-12-14 NOTE — PATIENT INSTRUCTIONS
White cells still high, hemoglobin very low-I'll call oncology today and either myself or Dr. Perry's office will call you with a plan. This may explain the fatigue and shortness of breath.     I'm treating you with an antibiotic just in case, but I don't see any signs of pneumonia.    I'll call the cardiologist to double check that your heart rate seems normal.

## 2017-12-14 NOTE — PROGRESS NOTES
"  SUBJECTIVE:   Austin Garza is a 77 year old male who presents to clinic today for the following health issues:    Acute Illness   Acute illness concerns: cough worsening (recent surgery for aneurysm)    Onset: since Monday might    Fever: no    Chills/Sweats: YES- both    Headache (location?): YES    Sinus Pressure:YES    Conjunctivitis:  no    Ear Pain: YES: both    Rhinorrhea: YES    Congestion: YES    Sore Throat: YES- feels it is from cough     Cough: YES-non-productive    Wheeze: no    Decreased Appetite: YES    Nausea: no    Vomiting: no    Diarrhea:  YES- since surgery    Dysuria/Freq.: no    Fatigue/Achiness: YES- fatigue    Sick/Strep Exposure: no     Therapies Tried and outcome: cough drops, cough syrup - OTC, tessalon - did not help    Recently diagnosed with CLL. No plan to treat, just to monitor for now. During the workup incidentally found an enlarged AAA, which was fixed a few weeks ago. Has been feeling well since then.    2 days ago developed a cough, and cough has gotten worse over the past few days.   Dry cough most of the time.  Tons of nasal drainage. Head feels full.   No fever, no chills.  Denies wheezing but endorses mild shortness of breath with exertion. No chest pain or palpitations.  Has been using OTC cough medication.  Ribs are sore from coughing.  Is concerned that incision line will be affected by coughing.    ROS: const/heent/resp/cv otherwise negative     OBJECTIVE:  /68 (BP Location: Right arm, Cuff Size: Adult Large)  Pulse 116  Temp 99.8  F (37.7  C) (Tympanic)  Resp 16  Ht 5' 8\" (1.727 m)  Wt 212 lb 11.2 oz (96.5 kg)  SpO2 96%  BMI 32.34 kg/m2  CONSTITUTIONAL: Alert, well-nourished, well-groomed, NAD  RESP: Lungs CTA. No wheeze, rhonchi, rales.  CV: HRRR S1 S2 No MRG. No peripheral edema  HEENT: Eyes: Conjunctiva pink and moist. Ears: Ear canals unremarkable. TMs pearly gray bilaterally. Bony landmarks and light reflexes intact. No erythema. Nose: Turbinates " pink and moist. Throat: OP pink and moist. No tonsillar enlargement or exudates. No postnasal drip.  Neck: No lymphadenopathy or masses. Thyroid smooth, non-tender, and non-enlarged.  DERM: Pallor  GI: Abdomen flat. BS x 4. No TTP. No HSM or masses. No CVAT      ASSESSMENT/PLAN:  (R05) Cough  (primary encounter diagnosis)  Comment: Patient recently diagnosed with CLL and s/p recent AAA repair presents with 2 days of worsening cough, mild shortness of breath with exertion, and LG fever. On exam he is tachycardic with a low grade fever, but lung sounds and xray are clear. He denies chest pain or palpitations. His anemia is significantly worsened. Most of his symptoms can likely be explained by a viral illness. However, given his CLL he is at much higher risk for serious infection, so will therefor prophylax him with Azithromycin. Spoke with Breana Perry, oncology, who also recommended we add on blood cultures (unclear whether he took a dose of azithromycin prior to blood cultures or not). At this point he does not appear septic in clinic (he is tachycardic but not hypotensive, he appears strong).  Plan: azithromycin (ZITHROMAX) 250 MG tablet, Blood         culture        Continue tessalon pearls.        Start Z-pack.        Rest, fluids.        Discussed reasons to seek care urgently, including fever over 101 (per Dr. Perry), dizziness, weakness, decreased urine output, worsening shortness of breath, etc.        Close f/u with PCP Monday.     (R06.02) SOB (shortness of breath)  Plan: CBC with platelets differential, Basic         metabolic panel, EKG 12-lead complete w/read -         Clinics, XR Chest 2 Views, CBC with platelets    (R00.0) Tachycardia  Comment: Likely 2/2 fever and infection. EKG machine called the rhythm a-flutter but confirmed with cardiology that rhythm is sinus. No signs of ischemia.   Plan: Increase fluid intake until urine is light yellow.    (R06.02) SOB (shortness of breath)  Plan: CBC with  platelets differential, Basic         metabolic panel, EKG 12-lead complete w/read -         Clinics, XR Chest 2 Views, CBC with platelets      (D64.9) Anemia, unspecified type  Comment: Worsening. This may be contributing to the fatigue and shortness of breath. Spoke with Dr. Perry. She doesn't think this represents worsening of his CLL as his white count is actually improving.   Plan: Close f/u with PCP Monday. Needs to recheck CBC.     (C91.10) CLL (chronic lymphocytic leukemia) (H)  Comment: Per oncology, hold on treatment, monitoring for now.       (R79.89) Blood creatinine increased compared with prior measurement  Comment: After the visit renal labs resulted. GFR slightly decreased.   Plan: Discussed with patient. Increase fluids.  Discussed signs of sepsis over the phone and reasons to seek care urgently.  Recheck BMP Monday.     60 minutes spent with patient, over 1/2 in counseling and coordination of care regarding infection and CLL.      Radha Peoples, FNP-DNP.

## 2017-12-15 NOTE — TELEPHONE ENCOUNTER
"Notified patient of below. He states \" I have been through this before.\"   Mariaelena Hummel RN    "

## 2017-12-18 ENCOUNTER — OFFICE VISIT (OUTPATIENT)
Dept: PEDIATRICS | Facility: CLINIC | Age: 77
End: 2017-12-18
Payer: COMMERCIAL

## 2017-12-18 VITALS
BODY MASS INDEX: 31.78 KG/M2 | WEIGHT: 209 LBS | SYSTOLIC BLOOD PRESSURE: 112 MMHG | TEMPERATURE: 98.1 F | DIASTOLIC BLOOD PRESSURE: 76 MMHG | HEART RATE: 110 BPM | OXYGEN SATURATION: 99 %

## 2017-12-18 DIAGNOSIS — J06.9 VIRAL URI WITH COUGH: Primary | ICD-10-CM

## 2017-12-18 PROCEDURE — 99214 OFFICE O/P EST MOD 30 MIN: CPT | Performed by: INTERNAL MEDICINE

## 2017-12-18 RX ORDER — CODEINE PHOSPHATE AND GUAIFENESIN 10; 100 MG/5ML; MG/5ML
1 SOLUTION ORAL
Qty: 120 ML | Refills: 0 | Status: SHIPPED | OUTPATIENT
Start: 2017-12-18 | End: 2018-01-04

## 2017-12-18 RX ORDER — FLUTICASONE PROPIONATE 50 MCG
2 SPRAY, SUSPENSION (ML) NASAL 2 TIMES DAILY
Qty: 1 BOTTLE | Refills: 11 | Status: SHIPPED | OUTPATIENT
Start: 2017-12-18 | End: 2019-01-18

## 2017-12-18 NOTE — PROGRESS NOTES
SUBJECTIVE:   Austin Garza is a 77 year old male who presents to clinic today for the following health issues:    Austin comes in for follow-up of his cough.  He initially developed a worsening cough starting on Tuesday of last week but his symptoms significantly worsened by the next day.  He called the nursing line and was started on Tessalon Perles which only seemed to make his symptoms worse.  He was subsequently seen in clinic on Thursday and was started on azithromycin.  He also had a BMP CBC and blood cultures drawn at that time.  Labs were notable for a mild bump in his creatinine to 1.3 and a drop in his hemoglobin to 8.5.     Since he was seen last week he reports that he continues to have pretty significant cough and nasal congestion.  He does endorse significant postnasal drainage. is cough tends to keep him up at night.  He denies any fevers or night sweats.  He is not lightheaded.  He is somewhat short of breath related to the cough but otherwise no shortness of breath.  He does not feel that his cough is improving significantly.  He has 1 day left of his azithromycin.      Problem list and histories reviewed & adjusted, as indicated.  Additional history: as documented    Patient Active Problem List   Diagnosis     Hearing loss     Dysfunction of eustachian tube     Hypertension goal BP (blood pressure) < 140/90     Diminished Peripheral Pulse     Type 2 diabetes mellitus without complication, without long-term current use of insulin (H)     HYPERLIPIDEMIA LDL GOAL <100     Inflammatory Monoarthritis, Left Hip     Advanced directives, counseling/discussion     Esophageal reflux     Overweight - BMI >35     BCC (basal cell carcinoma), face     Skin lesion of back     Leukocytosis     CLL (chronic lymphocytic leukemia) (H)     Abdominal aortic aneurysm (AAA) without rupture (H)     Past Surgical History:   Procedure Laterality Date     APPENDECTOMY  1966     BONE MARROW BIOPSY, BONE SPECIMEN,  NEEDLE/TROCAR N/A 11/21/2017    Procedure: BIOPSY BONE MARROW;  BONE MARROW BIOPSY;  Surgeon: Shady Garcia MD;  Location:  GI     COLONOSCOPY       ENDOVASCULAR REPAIR ANEURYSM ABDOMINAL AORTA N/A 12/4/2017    Procedure: ENDOVASCULAR REPAIR ANEURYSM ABDOMINAL AORTA;  ENDOVASCULAR REPAIR ANEURYSM ABDOMINAL AORTA;  Surgeon: Milton Cazares MD;  Location:  OR     SURGICAL HISTORY OF -   1985-90    Squamous cell skin cancer resection - back     SURGICAL HISTORY OF -   1998    skin tags resection     SURGICAL HISTORY OF -   2/2001    stress thall. 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 Mother      CANCER Mother      skin     Eye Disorder Mother      glaucoma     Prostate Cancer No family hx of      Cancer - colorectal No family hx of              Reviewed and updated as needed this visit by clinical staffTobacco  Allergies  Meds         ROS:  Constitutional, HEENT, cardiovascular, pulmonary, gi systems are negative, except as otherwise noted.      OBJECTIVE:   /76 (Cuff Size: Adult Large)  Pulse 110  Temp 98.1  F (36.7  C) (Oral)  Wt 209 lb (94.8 kg)  SpO2 99%  BMI 31.78 kg/m2  Body mass index is 31.78 kg/(m^2).  GENERAL: elderly man, pale but in NAD  HENT: normal cephalic/atraumatic, nose and mouth without ulcers or lesions, oropharynx clear and oral mucous membranes moist  NECK: no adenopathy, no asymmetry, masses, or scars and thyroid normal to  palpation  RESP: lungs clear to auscultation - no rales, rhonchi or wheezes  CV: Tachycardic but regular no murmurs    Diagnostic Test Results:  none     ASSESSMENT/PLAN:     1. Viral URI with cough  Anticipate that patient's symptoms are secondary to a viral upper respiratory tract infection given his associated nasal congestion and drainage as well as cough.  He has had minimal improvement on azithromycin but is otherwise afebrile and doing well.  At this point we will start him on intranasal steroid spray to try to help manage his postnasal drainage, and will also start a cough suppressant with Robitussin with codeine to help him sleep at night.  He will complete his course of azithromycin and follow-up in the next 3-4 days if his symptoms do not continue to improve or sooner if his symptoms worsen.  I do anticipate that his hemoglobin drop and creatinine bumped for both secondary to his recent EVAR, he is otherwise stable appearing at this time and can repeat labs in the next couple of weeks when he follows up with heme/onc or sooner if warranted.  - fluticasone (FLONASE) 50 MCG/ACT spray; Spray 2 sprays into both nostrils 2 times daily  Dispense: 1 Bottle; Refill: 11  - guaiFENesin-codeine (ROBITUSSIN AC) 100-10 MG/5ML SOLN solution; Take 5 mLs by mouth nightly as needed for cough  Dispense: 120 mL; Refill: 0    Patient Instructions   Most likely this is a virus.    I anticipate that part of the reason that you are having a hard time kicking this is due to the CLL.     I would recommend that you complete azithromycin.    Use the Flonase 2 sprays each nostril twice daily until you are feeling better.    Use cough syrup with codeine at night time to help with cough.     I don't think that we need repeat labs today, because I'm not surprised that your hemoglobin dropped slightly after your surgery, and your creatinine went up slightly.       Sandi Nguyen MD  [facility] [place]

## 2017-12-18 NOTE — NURSING NOTE
"Chief Complaint   Patient presents with     RECHECK       Initial /76 (Cuff Size: Adult Large)  Pulse 110  Temp 98.1  F (36.7  C) (Oral)  Wt 209 lb (94.8 kg)  SpO2 99%  BMI 31.78 kg/m2 Estimated body mass index is 31.78 kg/(m^2) as calculated from the following:    Height as of 12/14/17: 5' 8\" (1.727 m).    Weight as of this encounter: 209 lb (94.8 kg).  Medication Reconciliation: celena Jensen CMA    "

## 2017-12-18 NOTE — PATIENT INSTRUCTIONS
Most likely this is a virus.    I anticipate that part of the reason that you are having a hard time kicking this is due to the CLL.     I would recommend that you complete azithromycin.    Use the Flonase 2 sprays each nostril twice daily until you are feeling better.    Use cough syrup with codeine at night time to help with cough.     I don't think that we need repeat labs today, because I'm not surprised that your hemoglobin dropped slightly after your surgery, and your creatinine went up slightly.

## 2017-12-18 NOTE — MR AVS SNAPSHOT
After Visit Summary   12/18/2017    Austin Garza    MRN: [id number]           Patient Information     Date Of Birth          1940        Visit Information        Provider Department      12/18/2017 1:50 PM Sandi Eastman MD [facility]        Today's Diagnoses     Viral URI with cough    -  1      Care Instructions    Most likely this is a virus.    I anticipate that part of the reason that you are having a hard time kicking this is due to the CLL.     I would recommend that you complete azithromycin.    Use the Flonase 2 sprays each nostril twice daily until you are feeling better.    Use cough syrup with codeine at night time to help with cough.     I don't think that we need repeat labs today, because I'm not surprised that your hemoglobin dropped slightly after your surgery, and your creatinine went up slightly.           Follow-ups after your visit        Your next 10 appointments already scheduled     Dec 19, 2017 10:30 AM CST   Return Visit with Milton Cazares MD   [facility] ([facility])    [address]  [facility] [address]   [phone]            Jan 04, 2018  9:45 AM CST   Return Visit with Breana Perry MD   [facility] ([facility])    [facility]  [address]  [facility] [id number] [phone]              Who to contact     If you have questions or need follow up information about today's clinic visit or your schedule please contact [facility] directly at [phone].  Normal or non-critical lab and imaging results will be communicated to you by MyChart, letter or phone within 4 business days after the clinic has received the results. If you do not hear from us within 7 days, please contact the clinic through MyChart or phone. If you have a critical or abnormal lab result, we  will notify you by phone as soon as possible.  Submit refill requests through [employer] or call your pharmacy and they will forward the refill request to us. Please allow 3 business days for your refill to be completed.          Additional Information About Your Visit        [employer] Information     [employer] gives you secure access to your electronic health record. If you see a primary care provider, you can also send messages to your care team and make appointments. If you have questions, please call your primary care clinic.  If you do not have a primary care provider, please call [phone] and they will assist you.        Care EveryWhere ID     This is your Care EveryWhere ID. This could be used by other organizations to access your [facility] medical records  [id number]        Your Vitals Were     Pulse Temperature Pulse Oximetry BMI (Body Mass Index)          110 98.1  F (36.7  C) (Oral) 99% 31.78 kg/m2         Blood Pressure from Last 3 Encounters:   12/18/17 112/76   12/14/17 122/68   12/11/17 128/68    Weight from Last 3 Encounters:   12/18/17 209 lb (94.8 kg)   12/14/17 212 lb 11.2 oz (96.5 kg)   12/11/17 211 lb 9.6 oz (96 kg)              Today, you had the following     No orders found for display         Today's Medication Changes          These changes are accurate as of: 12/18/17  2:19 PM.  If you have any questions, ask your nurse or doctor.               Start taking these medicines.        Dose/Directions    fluticasone 50 MCG/ACT spray   Commonly known as:  FLONASE   Used for:  Viral URI with cough   Started by:  Sandi Eastman MD        Dose:  2 spray   Spray 2 sprays into both nostrils 2 times daily   Quantity:  1 Bottle   Refills:  11       guaiFENesin-codeine 100-10 MG/5ML Soln solution   Commonly known as:  ROBITUSSIN AC   Used for:  Viral URI with cough   Started by:  Sandi Eastman MD        Dose:  1 tsp.   Take 5 mLs by mouth nightly as needed for cough   Quantity:  120 mL   Refills:  0          Stop taking these medicines if you haven't already. Please contact your care team if you have questions.     benzonatate 200 MG capsule   Commonly known as:  TESSALON   Stopped by:  Sandi Eastman MD                Where to get your medicines      These medications were sent to [id number] - [place] - [address]  [address] [place] [address]     Phone:  [phone]     fluticasone 50 MCG/ACT spray         Some of these will need a paper prescription and others can be bought over the counter.  Ask your nurse if you have questions.     Bring a paper prescription for each of these medications     guaiFENesin-codeine 100-10 MG/5ML Soln solution                Primary Care Provider Office Phone # Fax #    Sandi Eastman [phone] [address] [place] [address]        Equal Access to Services     [facility]: Hadii aad ku hadasho Soomaali, waaxda luqadaha, qaybta kaalmada adeegyada, mina pastor hayaadeejay bennett . So [facility] [phone].    ATENCIÓN: Si habla español, tiene a edward disposición servicios gratuitos de asistencia lingüística. Llame al [phone].    We comply with applicable federal civil rights laws and [place] laws. We do not discriminate on the basis of race, color, national origin, age, disability, sex, sexual orientation, or gender identity.            Thank you!     [facility]. Our goal is always to provide you with excellent care. Hearing back from our patients is one way we can continue to improve our services. Please take a few minutes to complete the written survey that you may receive in the mail after your visit with us. Thank you!             Your Updated Medication List - Protect others around you: Learn how to safely use, store and throw away your medicines at www.disposemymeds.org.          This list is accurate as of: 12/18/17  2:19 PM.  Always  use your most recent med list.                   Brand Name Dispense Instructions for use Diagnosis    aspirin 325 MG tablet     90 tablet    Take 1 tablet (325 mg) by mouth every evening    Abdominal aortic aneurysm (AAA) without rupture (H)       azithromycin 250 MG tablet    ZITHROMAX    6 tablet    Two tablets first day, then one tablet daily for four days.    Cough       blood glucose monitoring meter device kit     1 kit    Use to test blood sugars 2-3 times daily as directed. Brand per insurance formulary    Type II or unspecified type diabetes mellitus without mention of complication, not stated as uncontrolled       blood glucose monitoring test strip    ONETOUCH ULTRA    100 each    Test 2-3 times daily as directed, brand per insurance formulary.    Type 2 diabetes mellitus without complication, without long-term current use of insulin (H)       CENTRUM SILVER per tablet      Take 1 tablet by mouth daily        fluticasone 50 MCG/ACT spray    FLONASE    1 Bottle    Spray 2 sprays into both nostrils 2 times daily    Viral URI with cough       glimepiride 1 MG tablet    AMARYL    90 tablet    Take 1 tablet (1 mg) by mouth every morning (before breakfast)    Type 2 diabetes mellitus without complication, without long-term current use of insulin (H)       guaiFENesin-codeine 100-10 MG/5ML Soln solution    ROBITUSSIN AC    120 mL    Take 5 mLs by mouth nightly as needed for cough    Viral URI with cough       LIPITOR PO      Take 20 mg by mouth At Bedtime        losartan-hydrochlorothiazide 50-12.5 MG per tablet    HYZAAR    90 tablet    Take 1 tablet by mouth daily    Hypertension goal BP (blood pressure) < 140/90       metFORMIN 1000 MG tablet    GLUCOPHAGE    180 tablet    Take 1 tablet (1,000 mg) by mouth 2 times daily (with meals)    Type 2 diabetes mellitus without complication, without long-term current use of insulin (H)

## 2017-12-19 ENCOUNTER — OFFICE VISIT (OUTPATIENT)
Dept: OTHER | Facility: CLINIC | Age: 77
End: 2017-12-19
Attending: SURGERY
Payer: MEDICARE

## 2017-12-19 VITALS — SYSTOLIC BLOOD PRESSURE: 118 MMHG | HEART RATE: 92 BPM | DIASTOLIC BLOOD PRESSURE: 71 MMHG

## 2017-12-19 DIAGNOSIS — I71.40 ABDOMINAL AORTIC ANEURYSM (AAA) WITHOUT RUPTURE (H): Primary | ICD-10-CM

## 2017-12-19 DIAGNOSIS — I71.40 ABDOMINAL AORTIC ANEURYSM (H): Primary | ICD-10-CM

## 2017-12-19 PROCEDURE — 99211 OFF/OP EST MAY X REQ PHY/QHP: CPT

## 2017-12-19 PROCEDURE — 99024 POSTOP FOLLOW-UP VISIT: CPT | Mod: ZP | Performed by: SURGERY

## 2017-12-19 NOTE — PROGRESS NOTES
Vascular Surgery Clinic    He is doing well since his EVAR 2 weeks ago.  He did develop an URI and was started on Azithromycin and this is almost complete with resolution of his symptoms.  He is overall doing well with no major issues.      O:  /71 (BP Location: Left arm, Patient Position: Chair, Cuff Size: Adult Large)  Pulse 92  Gen: NAD, alert and oriented x3  Abd: soft, NT, ND  Ext: warm and dry. Well perfused.  Palpable pedal pulses.  Groin access sites are soft, very minimal bruising in the right groin.  Otherwise c/d/i.     Assessment: 78 yo male s/p EVAR for infra-renal AAA    Plan:  I'll plan to see him back in clinic at 1 month post operatively with a CT angiogram of the abdomen and pelvis.  Today we reviewed his restrictions and his imaging from the EVAR.  I've discussed that with any invasive procedure including teeth cleaning he needs to be on prophylactic antibiotics and he understands.   Also, I've asked he avoid such things for 6 months duration to allow the graft to incorporate with his tissues.     Milton Cazares MD  Vascular Surgery

## 2017-12-19 NOTE — LETTER
2017    Re: Austin Garza, : 1940    Vascular Surgery Clinic    He is doing well since his EVAR 2 weeks ago.  He did develop an URI and was started on Azithromycin and this is almost complete with resolution of his symptoms.  He is overall doing well with no major issues.       O:  /71 (BP Location: Left arm, Patient Position: Chair, Cuff Size: Adult Large)  Pulse 92  Gen: NAD, alert and oriented x3  Abd: soft, NT, ND  Ext: warm and dry. Well perfused.  Palpable pedal pulses.  Groin access sites are soft, very minimal bruising in the right groin.  Otherwise c/d/i.      Assessment: 78 yo male s/p EVAR for infra-renal AAA     Plan:  I'll plan to see him back in clinic at 1 month post operatively with a CT angiogram of the abdomen and pelvis.  Today we reviewed his restrictions and his imaging from the EVAR.  I've discussed that with any invasive procedure including teeth cleaning he needs to be on prophylactic antibiotics and he understands.   Also, I've asked he avoid such things for 6 months duration to allow the graft to incorporate with his tissues.      Milton Cazaers MD  Vascular Surgery

## 2017-12-19 NOTE — MR AVS SNAPSHOT
After Visit Summary   12/19/2017    Austin Garza    MRN: [id number]           Patient Information     Date Of Birth          1940        Visit Information        Provider Department      12/19/2017 10:30 AM Milton Cazares MD [facility]      Today's Diagnoses     Abdominal aortic aneurysm (AAA) without rupture (H)    -  1       Follow-ups after your visit        Follow-up notes from your care team     Return in about 2 weeks (around 1/2/2018).      Your next 10 appointments already scheduled     Jan 04, 2018  9:45 AM CST   Return Visit with Breana Perry MD   [facility] ([facility])    [facility]  [address]  [facility] [address]   [phone]            Jan 16, 2018  9:00 AM CST   (Arrive by 8:45 AM)   CTA ANGIOGRAM ABDOMEN PELVIS with SHCT1   [facility] CT ([facility])    [address] [place] [id number] [phone]           Please bring any scans or X-rays taken at other hospitals, if similar tests were done. Also bring a list of your medicines, including vitamins, minerals and over-the-counter drugs. It is safest to leave personal items at home.  Be sure to tell your doctor:   If you have any allergies.   If there s any chance you are pregnant.   If you are breastfeeding.   If you have any special needs.  You will have contrast for this exam. To prepare:   Do not eat or drink for 2 hours before your exam. If you need to take medicine, you may take it with small sips of water. (We may ask you to take liquid medicine as well.)   The day before your exam, drink extra fluids at least six 8-ounce glasses (unless your doctor tells you to restrict your fluids).  Patients over 70 or patients with diabetes or kidney problems:   If you haven t had a blood test (creatinine test) within the last 30  days, go to your clinic or Diagnostic Imaging Department for this test.  If you have diabetes:   If your kidney function is normal, continue taking your metformin (Avandamet, Glucophage, Glucovance, Metaglip) on the day of your exam.   If your kidney function is abnormal, wait 48 hours before restarting this medicine.  Please wear loose clothing, such as a sweat suit or jogging clothes. Avoid snaps, zippers and other metal. We may ask you to undress and put on a hospital gown.  If you have any questions, please call the Imaging Department where you will have your exam.            Jan 16, 2018 10:30 AM CST   Return Visit with Milton Cazares MD   [facility] [place] ([facility])    [address]  [facility] [address]   [phone]              Future tests that were ordered for you today     Open Future Orders        Priority Expected Expires Ordered    CTA Angiogram Abdomen/Pelvis Routine 1/8/2018 12/20/2018 12/19/2017            Who to contact     If you have questions or need follow up information about today's clinic visit or your schedule please contact [facility] [place] directly at [phone].  Normal or non-critical lab and imaging results will be communicated to you by [employer], letter or phone within 4 business days after the clinic has received the results. If you do not hear from us within 7 days, please contact the clinic through [employer] or phone. If you have a critical or abnormal lab result, we will notify you by phone as soon as possible.  Submit refill requests through [employer] or call your pharmacy and they will forward the refill request to us. Please allow 3 business days for your refill to be completed.          Additional Information About Your Visit        [employer] Information     [employer] gives you secure access to your electronic health record. If you see a primary care provider, you can also send  messages to your care team and make appointments. If you have questions, please call your primary care clinic.  If you do not have a primary care provider, please call [phone] and they will assist you.        Care EveryWhere ID     This is your Care EveryWhere ID. This could be used by other organizations to access your [place] medical records  [id number]        Your Vitals Were     Pulse                   92            Blood Pressure from Last 3 Encounters:   12/19/17 118/71   12/18/17 112/76   12/14/17 122/68    Weight from Last 3 Encounters:   12/18/17 209 lb (94.8 kg)   12/14/17 212 lb 11.2 oz (96.5 kg)   12/11/17 211 lb 9.6 oz (96 kg)              Today, you had the following     No orders found for display       Primary Care Provider Office Phone # Fax #    Sandi Eastman [phone] [address] [place] [address]        Equal Access to Services     [facility]: Hadii aad ku hadasho Soomaali, waaxda luqadaha, qaybta kaalmada adeegyada, waxay idiin hayjennifern sylvain bennett . So [facility] [phone].    ATENCIÓN: Si habla español, tiene a edward disposición servicios gratuitos de asistencia lingüística. Llame al [phone].    We comply with applicable federal civil rights laws and [place] laws. We do not discriminate on the basis of race, color, national origin, age, disability, sex, sexual orientation, or gender identity.            Thank you!     Thank you for choosing [facility] [place]  for your care. Our goal is always to provide you with excellent care. Hearing back from our patients is one way we can continue to improve our services. Please take a few minutes to complete the written survey that you may receive in the mail after your visit with us. Thank you!             Your Updated Medication List - Protect others around you: Learn how to safely use, store and throw away your medicines at www.disposemymeds.org.          This list is  accurate as of: 12/19/17  3:21 PM.  Always use your most recent med list.                   Brand Name Dispense Instructions for use Diagnosis    aspirin 325 MG tablet     90 tablet    Take 1 tablet (325 mg) by mouth every evening    Abdominal aortic aneurysm (AAA) without rupture (H)       azithromycin 250 MG tablet    ZITHROMAX    6 tablet    Two tablets first day, then one tablet daily for four days.    Cough       blood glucose monitoring meter device kit     1 kit    Use to test blood sugars 2-3 times daily as directed. Brand per insurance formulary    Type II or unspecified type diabetes mellitus without mention of complication, not stated as uncontrolled       blood glucose monitoring test strip    ONETOUCH ULTRA    100 each    Test 2-3 times daily as directed, brand per insurance formulary.    Type 2 diabetes mellitus without complication, without long-term current use of insulin (H)       CENTRUM SILVER per tablet      Take 1 tablet by mouth daily        fluticasone 50 MCG/ACT spray    FLONASE    1 Bottle    Spray 2 sprays into both nostrils 2 times daily    Viral URI with cough       glimepiride 1 MG tablet    AMARYL    90 tablet    Take 1 tablet (1 mg) by mouth every morning (before breakfast)    Type 2 diabetes mellitus without complication, without long-term current use of insulin (H)       guaiFENesin-codeine 100-10 MG/5ML Soln solution    ROBITUSSIN AC    120 mL    Take 5 mLs by mouth nightly as needed for cough    Viral URI with cough       LIPITOR PO      Take 20 mg by mouth At Bedtime        losartan-hydrochlorothiazide 50-12.5 MG per tablet    HYZAAR    90 tablet    Take 1 tablet by mouth daily    Hypertension goal BP (blood pressure) < 140/90       metFORMIN 1000 MG tablet    GLUCOPHAGE    180 tablet    Take 1 tablet (1,000 mg) by mouth 2 times daily (with meals)    Type 2 diabetes mellitus without complication, without long-term current use of insulin (H)

## 2017-12-19 NOTE — NURSING NOTE
"Chief Complaint   Patient presents with     RECHECK     1st PO; ENDOVASCULAR AAA REPAIR WITH [employer] on 12/4/17       Initial /71 (BP Location: Left arm, Patient Position: Chair, Cuff Size: Adult Large)  Pulse 92 Estimated body mass index is 31.78 kg/(m^2) as calculated from the following:    Height as of 12/14/17: 5' 8\" (1.727 m).    Weight as of 12/18/17: 209 lb (94.8 kg).  Medication Reconciliation: complete     Face to face nursing time: 8 minutes    Gayle Childers MA     "

## 2017-12-20 LAB
BACTERIA SPEC CULT: NO GROWTH
Lab: NORMAL
SPECIMEN SOURCE: NORMAL

## 2017-12-26 DIAGNOSIS — E11.9 TYPE 2 DIABETES MELLITUS WITHOUT COMPLICATION, WITHOUT LONG-TERM CURRENT USE OF INSULIN (H): ICD-10-CM

## 2017-12-27 NOTE — TELEPHONE ENCOUNTER
Not due for a refill.     Requested Prescriptions   Pending Prescriptions Disp Refills     metFORMIN (GLUCOPHAGE) 1000 MG tablet [Pharmacy Med Name: METFORMIN HCL 1000 MG Tablet]    Last Written Prescription Date:  11/16/2017  Last Fill Quantity: 180,  # refills: 3   Last Office Visit with FMKESHA, MARIELLAP or [facility] prescribing provider:  12/18/2017   Future Office Visit:    Next 5 appointments (look out 90 days)     Jan 04, 2018  9:45 AM CST   Return Visit with Breana Perry MD   [facility] ([facility])    [facility]  [address]  [place] [address]   [phone]            Jan 16, 2018 10:30 AM CST   Return Visit with Milton Cazares MD   [facility] ([facility])    [address]  [place] [address]   [phone]                  180 tablet 3     Sig: TAKE 1 TABLET TWICE  DAILY (WITH MEALS)    Biguanide Agents Passed    12/26/2017 11:29 AM       Passed - Patient's BP is less than 140/90    BP Readings from Last 3 Encounters:   12/19/17 118/71   12/18/17 112/76   12/14/17 122/68                Passed - Patient has documented LDL within the past 12 mos.    Recent Labs   Lab Test  12/04/17   0625   LDL  56            Passed - Patient has had a Microalbumin in the past 12 mos.    Recent Labs   Lab Test  11/16/17   0805   MICROL  22   UMALCR  9.19            Passed - Patient is age 10 or older       Passed - Patient has documented A1c within the specified period of time.    Recent Labs   Lab Test  12/04/17   0625   A1C  6.6*            Passed - Patient's CR is NOT>1.4 OR Patient's EGFR is NOT<45 within past 12 mos.    Recent Labs   Lab Test  12/14/17   1119   GFRESTIMATED  54*   GFRESTBLACK  65       Recent Labs   Lab Test  12/14/17   1119   CR  1.30*            Passed - Patient does NOT have a diagnosis of CHF.       Passed - Recent (6 mos) or  future visit with authorizing provider's specialty    Patient had office visit in the last 6 months or has a visit in the next 30 days with authorizing provider.  See chart review.             glimepiride (AMARYL) 1 MG tablet [Pharmacy Med Name: GLIMEPIRIDE 1 MG Tablet]    Last Written Prescription Date:  11/16/2017  Last Fill Quantity: 90,  # refills: 3   Last Office Visit with [facility] or [facility] prescribing provider:  12/18/2017   Future Office Visit:    Next 5 appointments (look out 90 days)     Jan 04, 2018  9:45 AM CST   Return Visit with Breana Perry MD   [facility] ([facility])    [facility]  [address]  [place] [address] [phone]            Jan 16, 2018 10:30 AM CST   Return Visit with Milton Cazares MD   [facility] ([facility])    [address]  [place] [id number] [phone]                  90 tablet 3     Sig: TAKE 1 TABLET  EVERY MORNING (BEFORE BREAKFAST)    Sulfonylurea Agents Failed    12/26/2017 11:29 AM       Failed - Patient has a recent creatinine (normal) within the past 12 mos.    Recent Labs   Lab Test  12/14/17   1119   CR  1.30*            Passed - Patient's BP is less than 140/90    BP Readings from Last 3 Encounters:   12/19/17 118/71   12/18/17 112/76   12/14/17 122/68                Passed - Patient has documented LDL within the past 12 mos.    Recent Labs   Lab Test  12/04/17   0625   LDL  56            Passed - Patient has had a Microalbumin in the past 12 mos.    Recent Labs   Lab Test  11/16/17   0805   MICROL  22   UMALCR  9.19            Passed - Patient has documented A1c within the specified period of time.    Recent Labs   Lab Test  12/04/17   0625   A1C  6.6*            Passed - Patient is age 18 or older       Passed - Patient has had an appointment with authorizing provider within  the past 6 mos. or  within next 30 days    Patient had office visit in the last 6 months or has a visit in the next 30 days with authorizing provider.  See chart review.

## 2017-12-29 RX ORDER — GLIMEPIRIDE 1 MG/1
TABLET ORAL
Qty: 90 TABLET | Refills: 3 | OUTPATIENT
Start: 2017-12-29

## 2017-12-29 NOTE — TELEPHONE ENCOUNTER
These were sent for one year in November, sent back as duplicate.  Same pharmacy.  So Dumont, RN  Triage Nurse

## 2018-01-04 ENCOUNTER — ONCOLOGY VISIT (OUTPATIENT)
Dept: ONCOLOGY | Facility: CLINIC | Age: 78
End: 2018-01-04
Attending: INTERNAL MEDICINE
Payer: COMMERCIAL

## 2018-01-04 ENCOUNTER — HOSPITAL ENCOUNTER (OUTPATIENT)
Facility: CLINIC | Age: 78
Setting detail: SPECIMEN
Discharge: HOME | End: 2018-01-04
Attending: INTERNAL MEDICINE | Admitting: INTERNAL MEDICINE
Payer: MEDICARE

## 2018-01-04 VITALS
SYSTOLIC BLOOD PRESSURE: 114 MMHG | OXYGEN SATURATION: 97 % | HEIGHT: 68 IN | WEIGHT: 211 LBS | BODY MASS INDEX: 31.98 KG/M2 | TEMPERATURE: 96.9 F | RESPIRATION RATE: 16 BRPM | DIASTOLIC BLOOD PRESSURE: 74 MMHG | HEART RATE: 91 BPM

## 2018-01-04 DIAGNOSIS — C91.10 CLL (CHRONIC LYMPHOCYTIC LEUKEMIA) (H): ICD-10-CM

## 2018-01-04 LAB
ALBUMIN SERPL-MCNC: 3.6 G/DL (ref 3.4–5)
ALP SERPL-CCNC: 63 U/L (ref 40–150)
ALT SERPL W P-5'-P-CCNC: 23 U/L (ref 0–70)
ANION GAP SERPL CALCULATED.3IONS-SCNC: 4 MMOL/L (ref 3–14)
ANISOCYTOSIS BLD QL SMEAR: SLIGHT
AST SERPL W P-5'-P-CCNC: 17 U/L (ref 0–45)
BASOPHILS # BLD AUTO: 0 10E9/L (ref 0–0.2)
BASOPHILS NFR BLD AUTO: 0 %
BILIRUB SERPL-MCNC: 0.2 MG/DL (ref 0.2–1.3)
BUN SERPL-MCNC: 24 MG/DL (ref 7–30)
CALCIUM SERPL-MCNC: 8.6 MG/DL (ref 8.5–10.1)
CHLORIDE SERPL-SCNC: 106 MMOL/L (ref 94–109)
CO2 SERPL-SCNC: 27 MMOL/L (ref 20–32)
CREAT SERPL-MCNC: 1.13 MG/DL (ref 0.66–1.25)
DACRYOCYTES BLD QL SMEAR: SLIGHT
DIFFERENTIAL METHOD BLD: ABNORMAL
EOSINOPHIL # BLD AUTO: 0 10E9/L (ref 0–0.7)
EOSINOPHIL NFR BLD AUTO: 0 %
ERYTHROCYTE [DISTWIDTH] IN BLOOD BY AUTOMATED COUNT: 15.7 % (ref 10–15)
GFR SERPL CREATININE-BSD FRML MDRD: 63 ML/MIN/1.7M2
GLUCOSE SERPL-MCNC: 287 MG/DL (ref 70–99)
HCT VFR BLD AUTO: 30.1 % (ref 40–53)
HGB BLD-MCNC: 9.4 G/DL (ref 13.3–17.7)
LDH SERPL L TO P-CCNC: 238 U/L (ref 85–227)
LYMPHOCYTES # BLD AUTO: 39.9 10E9/L (ref 0.8–5.3)
LYMPHOCYTES NFR BLD AUTO: 91 %
MCH RBC QN AUTO: 33.7 PG (ref 26.5–33)
MCHC RBC AUTO-ENTMCNC: 31.2 G/DL (ref 31.5–36.5)
MCV RBC AUTO: 108 FL (ref 78–100)
MONOCYTES # BLD AUTO: 0.4 10E9/L (ref 0–1.3)
MONOCYTES NFR BLD AUTO: 1 %
NEUTROPHILS # BLD AUTO: 3.5 10E9/L (ref 1.6–8.3)
NEUTROPHILS NFR BLD AUTO: 8 %
OVALOCYTES BLD QL SMEAR: SLIGHT
PLATELET # BLD AUTO: 104 10E9/L (ref 150–450)
PLATELET # BLD EST: ABNORMAL 10*3/UL
POTASSIUM SERPL-SCNC: 4.7 MMOL/L (ref 3.4–5.3)
PROT SERPL-MCNC: 7 G/DL (ref 6.8–8.8)
RBC # BLD AUTO: 2.79 10E12/L (ref 4.4–5.9)
SODIUM SERPL-SCNC: 137 MMOL/L (ref 133–144)
URATE SERPL-MCNC: 6.1 MG/DL (ref 3.5–7.2)
WBC # BLD AUTO: 43.8 10E9/L (ref 4–11)

## 2018-01-04 PROCEDURE — 85025 COMPLETE CBC W/AUTO DIFF WBC: CPT | Performed by: INTERNAL MEDICINE

## 2018-01-04 PROCEDURE — 83615 LACTATE (LD) (LDH) ENZYME: CPT | Performed by: INTERNAL MEDICINE

## 2018-01-04 PROCEDURE — 84550 ASSAY OF BLOOD/URIC ACID: CPT | Performed by: INTERNAL MEDICINE

## 2018-01-04 PROCEDURE — 99214 OFFICE O/P EST MOD 30 MIN: CPT | Mod: 24 | Performed by: INTERNAL MEDICINE

## 2018-01-04 PROCEDURE — G0463 HOSPITAL OUTPT CLINIC VISIT: HCPCS

## 2018-01-04 PROCEDURE — 80053 COMPREHEN METABOLIC PANEL: CPT | Performed by: INTERNAL MEDICINE

## 2018-01-04 ASSESSMENT — PAIN SCALES - GENERAL: PAINLEVEL: NO PAIN (0)

## 2018-01-04 NOTE — PROGRESS NOTES
[facility]    Hematology/Oncology Established Patient Note      Today's Date: 1/04/18    Reason for Follow-up: CLL      HISTORY OF PRESENT ILLNESS: Austin Garza is a 77 year old male with PMHx of DMII, HTN who presented with leukocytosis.  In November 2017, he was found to have elevated WBC of 61.7K, hemoglobin of 10.4, and platelet of 115K.  There were no other CBC results available between 2010 and 2017.  Prior to 2010, he had normal CBC, with normal to mild anemia, and mild thrombocytopenia.   He was asymptomatic.    He underwent bone marrow biopsy on 11/21/17, which was consistent with chronic lymphocytic leukemia/small lymphocytic lymphoma, with 13% ringed sideroblasts identified.  The presence of increased numbers of ringed sideroblasts raises the possibility of an associated myelodysplastic syndrome.  Dysplastic changes are not identified though.  Many cases exhibiting ringed sideroblasts are metabolic origin, without significant risk of progression to acute leukemia, and these typically do not show dysplastic morphology as is the case with this specimen.  15% ringed sideroblasts are required for a diagnosis for RARS, which this specimen does not meet.  Flow cytometry is also consistent with CLL/SLL.  Cytogenetics are still pending.    CT scan on 11/29/17 shows mildly enlarged left axillary lymph node and periaortic lymph nodes in the retroperitoneum of the abdomen.  Spleen is also enlarged.    On his staging CT scan for CLL, he was incidentally found to have a large infrarenal abdominal aortic aneurysm, measuring 7.6 cm.  He was seen by Dr. Thomas, and he underwent EVAR on 12/4/17.       INTERIM HISTORY: Austin is here for follow-up with his wife today.  He says that he is feeling well.  He underwent EVAR on 12/4/17 and did well. He had a cold a couple of weeks ago, but has since recovered from that.  He denies fever/chills, night sweats.        REVIEW OF SYSTEMS:   14 point ROS  was reviewed and is negative other than as noted above in HPI.       HOME MEDICATIONS:  Current Outpatient Prescriptions   Medication Sig Dispense Refill     fluticasone (FLONASE) 50 MCG/ACT spray Spray 2 sprays into both nostrils 2 times daily 1 Bottle 11     aspirin 325 MG tablet Take 1 tablet (325 mg) by mouth every evening 90 tablet 3     Atorvastatin Calcium (LIPITOR PO) Take 20 mg by mouth At Bedtime       Multiple Vitamins-Minerals (CENTRUM SILVER) per tablet Take 1 tablet by mouth daily       glimepiride (AMARYL) 1 MG tablet Take 1 tablet (1 mg) by mouth every morning (before breakfast) 90 tablet 3     metFORMIN (GLUCOPHAGE) 1000 MG tablet Take 1 tablet (1,000 mg) by mouth 2 times daily (with meals) 180 tablet 3     losartan-hydrochlorothiazide (HYZAAR) 50-12.5 MG per tablet Take 1 tablet by mouth daily 90 tablet 3     blood glucose monitoring (ONE TOUCH ULTRA) test strip Test 2-3 times daily as directed, brand per insurance formulary. 100 each 0     Blood Glucose Monitoring Suppl (ONE TOUCH ULTRA 2) W/DEVICE KIT Use to test blood sugars 2-3 times daily as directed. Brand per insurance formulary   1 kit 0         ALLERGIES:  Allergies   Allergen Reactions     Ace Inhibitors      cough         PAST MEDICAL HISTORY:  Past Medical History:   Diagnosis Date     Diaphragmatic hernia without mention of obstruction or gangrene      Diverticulitis of colon (without mention of hemorrhage)(562.11)      Esophageal reflux      Irritable bowel syndrome      Macular degeneration (senile) of retina, unspecified      Squamous Cell Carcinoma, Back 1988     Type II or unspecified type diabetes mellitus without mention of complication, not stated as uncontrolled      Unspecified essential hypertension          PAST SURGICAL HISTORY:  Past Surgical History:   Procedure Laterality Date     APPENDECTOMY  1966     BONE MARROW BIOPSY, BONE SPECIMEN, NEEDLE/TROCAR N/A 11/21/2017    Procedure: BIOPSY BONE MARROW;  BONE MARROW BIOPSY;  " Surgeon: Shady Garcia MD;  Location:  GI     COLONOSCOPY       ENDOVASCULAR REPAIR ANEURYSM ABDOMINAL AORTA N/A 2017    Procedure: ENDOVASCULAR REPAIR ANEURYSM ABDOMINAL AORTA;  ENDOVASCULAR REPAIR ANEURYSM ABDOMINAL AORTA;  Surgeon: Milton Cazares MD;  Location:  OR     SURGICAL HISTORY OF -       Squamous cell skin cancer resection - back     SURGICAL HISTORY OF -       skin tags resection     SURGICAL HISTORY OF -   2001    stress thall. normal     SURGICAL HISTORY OF -   2002    colonoscopy     SURGICAL HISTORY OF -       Fistulotomy with marsupialization for repair of fisture in ano         SOCIAL HISTORY:  Social History     Social History     Marital status:      Spouse name: librado     Number of children: 0     Years of education: 17     Occupational History     retired      Social History Main Topics     Smoking status: Former Smoker     Packs/day: 0.50     Years: 20.00     Quit date: 1975     Smokeless tobacco: Former User     Alcohol use 6.0 - 8.4 oz/week     10 - 14 Standard drinks or equivalent per week      Comment: 2 drinks a week     Drug use: No     Sexual activity: No     Other Topics Concern     Not on file     Social History Narrative   He quit smoking in .  He drinks 1-3 glasses of wine a night with dinner.  He is retired, and used to work as a .  He lives with his wife in [place].  His cousin had lung cancer and  around age 69.  Otherwise, he denies family history of cancer.        FAMILY HISTORY:  Family History   Problem Relation Age of Onset     CEREBROVASCULAR DISEASE Father      x 2     Hypertension Mother      C.A.D. Mother      CANCER Mother      skin     Eye Disorder Mother      glaucoma     Prostate Cancer No family hx of      Cancer - colorectal No family hx of          PHYSICAL EXAM:  Vital signs:  /74  Pulse 91  Temp 96.9  F (36.1  C) (Tympanic)  Resp 16  Ht 1.727 m (5' 8\")  Wt 95.7 kg (211 lb)  SpO2 " 97%  BMI 32.08 kg/m2   GENERAL/CONSTITUTIONAL: No acute distress. Accompanied by wife.  EYES: No scleral icterus.  LYMPH: No anterior cervical, posterior cervical, or supraclavicular adenopathy.   RESPIRATORY: Clear to auscultation bilaterally. No crackles or wheezing.   CARDIOVASCULAR: Regular rate and rhythm without murmurs, gallops, or rubs.  GASTROINTESTINAL: No tenderness. The patient has normal bowel sounds. No guarding.  No distention.  MUSCULOSKELETAL: Warm and well-perfused, no cyanosis, clubbing, or edema.  NEUROLOGIC: Alert, oriented, answers questions appropriately.  INTEGUMENTARY: No jaundice.      LABS:  CBC RESULTS:   Recent Labs   Lab Test  01/04/18   0954   WBC  43.8*   RBC  2.79*   HGB  9.4*   HCT  30.1*   MCV  108*   MCH  33.7*   MCHC  31.2*   RDW  15.7*   PLT  104*     Recent Labs   Lab Test  01/04/18   0954  12/14/17   1119   NA  137  136   POTASSIUM  4.7  5.1   CHLORIDE  106  99   CO2  27  27   ANIONGAP  4  10   GLC  287*  197*   BUN  24  24   CR  1.13  1.30*   MAGDALENO  8.6  9.4     Lab Results   Component Value Date    AST 17 01/04/2018     Lab Results   Component Value Date    ALT 23 01/04/2018     Lab Results   Component Value Date    BILICONJ 0.0 04/29/2005      Lab Results   Component Value Date    BILITOTAL 0.2 01/04/2018     Lab Results   Component Value Date    ALBUMIN 3.6 01/04/2018     Lab Results   Component Value Date    PROTTOTAL 7.0 01/04/2018      Lab Results   Component Value Date    ALKPHOS 63 01/04/2018     Component      Latest Ref Rng & Units 1/4/2018 1/4/2018           9:54 AM  9:54 AM   Lactate Dehydrogenase      85 - 227 U/L 238 (H)    Uric Acid      3.5 - 7.2 mg/dL  6.1       PATHOLOGY:  Bone marrow biopsy 11/21/17:  FINAL DIAGNOSIS:   Peripheral blood demonstrating macrocytic anemia with thrombocytopenia     Bone marrow trephine biopsy and aspirate demonstrating hypercellular   bone marrow involved by chronic lymphocytic leukemia/small lymphocytic   lymphoma, 13% ringed  sideroblasts identified (see comment)     COMMENT:   Immunophenotyping studies demonstrate a kappa monotypic CD5 positive   B-cell population most compatible with chronic lymphocytic leukemia.  An   immunoperoxidase stain for cyclin D1 is pending.  Dysplastic changes are   not identified within the erythroid series.  However, the presence of   increased numbers of ringed sideroblasts raises the possibility of an   associated myelodysplastic syndrome, particularly in light of the noted   increased mean red cell volume and associated thrombocytopenia.  The   thrombocytopenia may also reflect reduced numbers of megakaryocytes   given the involvement of marrow by chronic lymphocytic leukemia.  Many   cases exhibiting ringed sideroblasts are metabolic in origin, without   significant risk of progression to acute leukemia, and these typically   do not show dysplastic morphology as is the case with the current   specimen.  Moreover, 15% ringed sideroblasts are required for a   diagnosis of  RARS. Cytogenetic studies are pending.     Flow  INTERPRETATION:   Bone Marrow, Left:        CD5 positive Kappa monotypic B cells (82%)     COMMENT:   The clonal B-cell population present in this specimen has a similar   immunophenotype as reported previously in the blood from this patient   ([id number]).  The immunophenotypic findings are suggestive of marrow   involvement by small lymphocytic lymphoma/chronic lymphocytic leukemia   (SLL/CLL). Large cells may not survive specimen processing.  There may   be peripheral blood contamination. Final interpretation requires   correlation with results of other ancillary studies, morphologic and   clinical features.     ISCN:   46,XY,del(13)(q12q14)[2]/45,X,-Y[3]/46,XY[15].nuc   alton(ATMx2,TP53x1)[4/200],(M38K083r0,QGDC5u3)[21/200]     INTERPRETATION:   Two (10%) of the metaphase cells analyzed comprised a clone   characterized by a deletion within the proximal long arm of a chromosome   13 as  the sole karyotypic abnormality. Additionally, three (15%)   metaphases had loss of the Y chromosome as the sole abnormality.     These findings confirm and expand those of the previously reported FISH   analysis ([id number]) that showed 10.5% of interphase cells to have a   signal pattern indicative of loss of the [id number] locus.     Loss of the Y chromosome, as seen in this case, has been reported both   as an acquired clonal abnormality associated with hematologic (primarily   myeloid) disorders and also as a clinically insignificant finding   associated with the normal aging process in adult males. When presenting   as a clonal abnormality, loss of Y is typically seen in addition to   other cytogenetic abnormalities. This patient's age and the absence of   other chromosomal abnormalities suggest that the loss of Y represents an   age-related finding in this case.       IMAGING:  CT c/a/p 11/29/17:  FINDINGS:   Chest: Possible minor fissure lymph node on series 3, image 45 along  the right minor fissure measuring 0.3 cm. Anterior right upper lobe  nodule measures 0.3 cm on image 40. A lateral right lower lobe nodule  measures 0.4 cm on image 47. Bibasilar atelectasis is noted. No  infiltrate or consolidation. No pleural or pericardial fluid. Heart is  normal in size. Esophagus is unremarkable. A few small mediastinal and  hilar lymph nodes are present, but none exceed size criteria for  abnormality. Bilateral axillary lymph nodes are also present. The  largest is in the left axilla measuring 1.7 x 1.1 cm on series 2,  image 18. Coronary artery calcification is present. Aorta demonstrates  scattered calcified plaque without aneurysm.     Abdomen: A large infrarenal abdominal aortic aneurysm is noted  measuring 7.6 x 6.7 cm on series 2, image 80 and contains a moderate  amount of mural thrombus. There is slight haziness around the margin  of the aneurysm sac of uncertain significance as a few scattered lymph  nodes  are noted in this area. Periaortic inflammation is possible. No  obvious retroperitoneal hemorrhage at this time. A periaortic node on  series 2, image 76 near the superior aspect of the aneurysm measures  1.9 x 1.4 cm. Aneurysm does not extend down into the common iliac  arteries. Probable calcified gallstone in the gallbladder on series 2,  image 69. Gallbladder is otherwise within normal limits. The liver,  pancreas, adrenal glands and kidneys are unremarkable. No  hydronephrosis or renal calculi. Spleen is prominent in size without  focal mass measuring 15.7 cm in AP dimension. Tiny cyst posterior  spleen is noted on series 2, image 64. No evidence of bowel  obstruction or diverticulitis.     Pelvis: The bladder and rectum are unremarkable. No enlarged pelvic  lymph nodes or free fluid. Bone window examination demonstrates  degenerative spine changes.         IMPRESSION:  1. Mildly enlarged left axillary lymph node and periaortic lymph nodes  in the retroperitoneum of the abdomen. Spleen is also enlarged.  Findings would be consistent with patient's underlying diagnosis of  CLL. Follow up as clinically warranted.  2. Infrarenal abdominal aortic aneurysm measuring up to 7.6 cm in  diameter. Mild indistinctness of the aneurysm wall could be due to  inflammation. No retroperitoneal hemorrhage is currently evident.  Recommend followup with vascular surgery or interventional radiology  for further assessment.        ASSESSMENT/PLAN:  Austin Garza is a 77 year old male with:    1) CLL/SLL: BLACKWOOD stage III, with lymphocytosis, lymphadenopathy, splenomegaly, and anemia.  He has mild thrombocytopenia as well, but is above 100K.  He presented with leukocytosis with WBC of 61.7K, hemoglobin of 10.4, and platelet count of 115K on diagnosis.  Bone marrow biopsy and flow cytometry confirmed CLL/SLL.  CT scan shows mildly enlarged left axillary lymph node and periaortic lymph nodes int he retroperitoneum of the abdomen, with  enlarged spleen.      I reviewed this diagnosis with Austin and his wife.  At this point, his CBC appears stable.  He has elevated WBC, but is not necessarily an indication to start treatment.  If he has increased doubling time of his lymphocyte account and progressive anemia and thrombobocytopenia, then we may consider treatment.  If he has worsening lymphadenopathy that is causing symptoms or organ dysfunction, then we may consider treatment.  He does not have B-symptoms currently.  In addition, with his large aortic aneurysm and just had an EVAR done, I would not start treatment for CLL at this point anyway.  He will focus on the aneurysm repair and healing from that for now.  I recommended that we watch and wait for now.  Austin agrees with this, and we will continue to monitor his CBC periodically.      -CBC from today reviewed: WBC remains elevated, but stable from the previous check.  His hemoglobin was decreased a couple of weeks ago, maybe from the procedure and infection, but has trended up today.  Platelet count has trended down some, but remains above 100K; it may be related to recent infection and antibiotics.  -continue monitor CBC for now  -RTC in 1 month with repeat labs    2) Abdominal aortic aneurysm: measures up to 7.6 cm on CT scan, incidentally found.  He underwent EVAR on 12/4/17.  -follow-up with vascular surgery; he has follow-up ant CT angiogram coming up on 1/16/18    3) Diabetes, hypertension:  -following with PCP      I spent a total of 25 minutes with the patient, with over >50% of the time in counseling and/or coordination of care.      Breana Perry MD  Hematology/Oncology  [facility]

## 2018-01-04 NOTE — MR AVS SNAPSHOT
After Visit Summary   1/4/2018    Austin Garza    MRN: [id number]           Patient Information     Date Of Birth          1940        Visit Information        Provider Department      1/4/2018 9:45 AM Breana Perry MD [facility]        Today's Diagnoses     CLL (chronic lymphocytic leukemia) (H)          Care Instructions    -schedule labs on 1/31/18  -return to clinic on 2/1/18 with Dr. Perry          Follow-ups after your visit        Your next 10 appointments already scheduled     Jan 16, 2018  9:00 AM CST   (Arrive by 8:45 AM)   CTA ANGIOGRAM ABDOMEN PELVIS with SHCT1   [facility] ([facility])    [address] [place] [address] [phone]           Please bring any scans or X-rays taken at other hospitals, if similar tests were done. Also bring a list of your medicines, including vitamins, minerals and over-the-counter drugs. It is safest to leave personal items at home.  Be sure to tell your doctor:   If you have any allergies.   If there s any chance you are pregnant.   If you are breastfeeding.   If you have any special needs.  You will have contrast for this exam. To prepare:   Do not eat or drink for 2 hours before your exam. If you need to take medicine, you may take it with small sips of water. (We may ask you to take liquid medicine as well.)   The day before your exam, drink extra fluids at least six 8-ounce glasses (unless your doctor tells you to restrict your fluids).  Patients over 70 or patients with diabetes or kidney problems:   If you haven t had a blood test (creatinine test) within the last 30 days, go to your clinic or Diagnostic Imaging Department for this test.  If you have diabetes:   If your kidney function is normal, continue taking your metformin (Avandamet, Glucophage, Glucovance, Metaglip) on the day of your exam.   If your kidney function is abnormal, wait 48 hours before  restarting this medicine.  Please wear loose clothing, such as a sweat suit or jogging clothes. Avoid snaps, zippers and other metal. We may ask you to undress and put on a hospital gown.  If you have any questions, please call the Imaging Department where you will have your exam.            Jan 16, 2018 10:30 AM CST   Return Visit with Milton Cazares MD   [facility] ([facility])    [address]  [place] [address]   [phone]            Jan 31, 2018  2:00 PM CST   Return Visit with  ONCOLOGY NURSE   [facility] ([facility])    [facility]  [address]  [facility] [address]   [phone]            Feb 01, 2018 12:45 PM CST   Return Visit with Breana Perry MD   [facility] ([facility])    [facility]  [address]  [facility] [address]   [phone]              Future tests that were ordered for you today     Open Future Orders        Priority Expected Expires Ordered    CBC with platelets differential Routine 1/31/2018 1/4/2019 1/4/2018    Comprehensive metabolic panel Routine 1/31/2018 1/4/2019 1/4/2018    Lactate Dehydrogenase Routine 1/31/2018 1/4/2019 1/4/2018    Uric acid Routine 1/31/2018 1/4/2019 1/4/2018            Who to contact     If you have questions or need follow up information about today's clinic visit or your schedule please contact [facility] directly at [phone].  Normal or non-critical lab and imaging results will be communicated to you by MyChart, letter or phone within 4 business days after the clinic has received the results. If you do not hear from us within 7 days, please contact the clinic through MyChart or phone. If you have a critical or abnormal lab result, we will notify you by phone as soon as  "possible.  Submit refill requests through [employer] or call your pharmacy and they will forward the refill request to us. Please allow 3 business days for your refill to be completed.          Additional Information About Your Visit        [employer] Information     [employer] gives you secure access to your electronic health record. If you see a primary care provider, you can also send messages to your care team and make appointments. If you have questions, please call your primary care clinic.  If you do not have a primary care provider, please call [phone] and they will assist you.        Care EveryWhere ID     This is your Care EveryWhere ID. This could be used by other organizations to access your [place] medical records  [id number]        Your Vitals Were     Pulse Temperature Respirations Height Pulse Oximetry BMI (Body Mass Index)    91 96.9  F (36.1  C) (Tympanic) 16 1.727 m (5' 8\") 97% 32.08 kg/m2       Blood Pressure from Last 3 Encounters:   01/04/18 114/74   12/19/17 118/71   12/18/17 112/76    Weight from Last 3 Encounters:   01/04/18 95.7 kg (211 lb)   12/18/17 94.8 kg (209 lb)   12/14/17 96.5 kg (212 lb 11.2 oz)              We Performed the Following     CBC with platelets differential     Comprehensive metabolic panel     Lactate Dehydrogenase     Uric acid          Today's Medication Changes          These changes are accurate as of: 1/4/18 10:55 AM.  If you have any questions, ask your nurse or doctor.               Stop taking these medicines if you haven't already. Please contact your care team if you have questions.     azithromycin 250 MG tablet   Commonly known as:  ZITHROMAX           guaiFENesin-codeine 100-10 MG/5ML Soln solution   Commonly known as:  ROBITUSSIN AC                    Primary Care Provider Office Phone # Fax #    Sandi Eastman [phone] [address] [place] [address]        Equal Access to Services     BAKARI OSMAN AH: Robert simon " rajeev Valencia, waalisonda luchuyadaha, qaybta kacollinsda michael, mina gracielain hayaan oseassheri pravinabe lamakayladeejay sharath. So [facility] [phone].    ATENCIÓN: Si habla josephine, tiene a edward disposición servicios gratuitos de asistencia lingüística. Anika al [phone].    We comply with applicable federal civil rights laws and [place] laws. We do not discriminate on the basis of race, color, national origin, age, disability, sex, sexual orientation, or gender identity.            Thank you!     Thank you for choosing [facility]  for your care. Our goal is always to provide you with excellent care. Hearing back from our patients is one way we can continue to improve our services. Please take a few minutes to complete the written survey that you may receive in the mail after your visit with us. Thank you!             Your Updated Medication List - Protect others around you: Learn how to safely use, store and throw away your medicines at www.disposemymeds.org.          This list is accurate as of: 1/4/18 10:55 AM.  Always use your most recent med list.                   Brand Name Dispense Instructions for use Diagnosis    aspirin 325 MG tablet     90 tablet    Take 1 tablet (325 mg) by mouth every evening    Abdominal aortic aneurysm (AAA) without rupture (H)       blood glucose monitoring meter device kit     1 kit    Use to test blood sugars 2-3 times daily as directed. Brand per insurance formulary    Type II or unspecified type diabetes mellitus without mention of complication, not stated as uncontrolled       blood glucose monitoring test strip    ONETOUCH ULTRA    100 each    Test 2-3 times daily as directed, brand per insurance formulary.    Type 2 diabetes mellitus without complication, without long-term current use of insulin (H)       CENTRUM SILVER per tablet      Take 1 tablet by mouth daily        fluticasone 50 MCG/ACT spray    FLONASE    1 Bottle    Spray 2 sprays into both nostrils 2 times  daily    Viral URI with cough       glimepiride 1 MG tablet    AMARYL    90 tablet    Take 1 tablet (1 mg) by mouth every morning (before breakfast)    Type 2 diabetes mellitus without complication, without long-term current use of insulin (H)       LIPITOR PO      Take 20 mg by mouth At Bedtime        losartan-hydrochlorothiazide 50-12.5 MG per tablet    HYZAAR    90 tablet    Take 1 tablet by mouth daily    Hypertension goal BP (blood pressure) < 140/90       metFORMIN 1000 MG tablet    GLUCOPHAGE    180 tablet    Take 1 tablet (1,000 mg) by mouth 2 times daily (with meals)    Type 2 diabetes mellitus without complication, without long-term current use of insulin (H)

## 2018-01-04 NOTE — NURSING NOTE
"Oncology Rooming Note    January 4, 2018 9:59 AM   Austin Garza is a 77 year old male who presents for:    Chief Complaint   Patient presents with     Oncology Clinic Visit     follow up     Initial Vitals: /74  Pulse 91  Temp 96.9  F (36.1  C) (Tympanic)  Resp 16  Ht 1.727 m (5' 8\")  Wt 95.7 kg (211 lb)  SpO2 97%  BMI 32.08 kg/m2 Estimated body mass index is 32.08 kg/(m^2) as calculated from the following:    Height as of this encounter: 1.727 m (5' 8\").    Weight as of this encounter: 95.7 kg (211 lb). Body surface area is 2.14 meters squared.  No Pain (0) Comment: Data Unavailable   No LMP for male patient.  Allergies reviewed: Yes  Medications reviewed: Yes    Medications: Medication refills not needed today.  Pharmacy name entered into [employer]:    [facility] - [facility] [address] [facility] [address] [facility] [address] [facility]    Clinical concerns: follow up     8 minutes for nursing intake (face to face time)     Ita Pinto CMA     DISCHARGE PLAN:  Next appointments: See patient instruction section  Departure Mode: Ambulatory  Accompanied by: wife  0 minutes for nursing discharge (face to face time)   Ita Pinto CMA                  "

## 2018-01-04 NOTE — PATIENT INSTRUCTIONS
-schedule labs on 1/31/18-Scheduled. Cristin RAYMUNDO  -return to clinic on 2/1/18 with Dr. Perry-Scheduled. Cristin RAYMUNDO  AVS printed and given to Pt. Cristin RAYMUNDO

## 2018-01-14 DIAGNOSIS — I10 HYPERTENSION GOAL BP (BLOOD PRESSURE) < 140/90: ICD-10-CM

## 2018-01-16 ENCOUNTER — OFFICE VISIT (OUTPATIENT)
Dept: OTHER | Facility: CLINIC | Age: 78
End: 2018-01-16
Attending: SURGERY
Payer: MEDICARE

## 2018-01-16 ENCOUNTER — HOSPITAL ENCOUNTER (OUTPATIENT)
Dept: CT IMAGING | Facility: CLINIC | Age: 78
Discharge: HOME | End: 2018-01-16
Attending: SURGERY | Admitting: SURGERY
Payer: MEDICARE

## 2018-01-16 VITALS — HEART RATE: 61 BPM | DIASTOLIC BLOOD PRESSURE: 70 MMHG | SYSTOLIC BLOOD PRESSURE: 122 MMHG

## 2018-01-16 DIAGNOSIS — I71.40 ABDOMINAL AORTIC ANEURYSM (AAA) WITHOUT RUPTURE (H): Primary | ICD-10-CM

## 2018-01-16 DIAGNOSIS — I71.40 ABDOMINAL AORTIC ANEURYSM (H): Primary | ICD-10-CM

## 2018-01-16 DIAGNOSIS — I71.40 ABDOMINAL AORTIC ANEURYSM (H): ICD-10-CM

## 2018-01-16 PROCEDURE — 25000128 H RX IP 250 OP 636: Performed by: SURGERY

## 2018-01-16 PROCEDURE — 99024 POSTOP FOLLOW-UP VISIT: CPT | Mod: ZP | Performed by: SURGERY

## 2018-01-16 PROCEDURE — 74174 CTA ABD&PLVS W/CONTRAST: CPT

## 2018-01-16 PROCEDURE — G0463 HOSPITAL OUTPT CLINIC VISIT: HCPCS

## 2018-01-16 PROCEDURE — 25000125 ZZHC RX 250: Performed by: SURGERY

## 2018-01-16 RX ORDER — IOPAMIDOL 755 MG/ML
80 INJECTION, SOLUTION INTRAVASCULAR ONCE
Status: COMPLETED | OUTPATIENT
Start: 2018-01-16 | End: 2018-01-16

## 2018-01-16 RX ADMIN — IOPAMIDOL 80 ML: 755 INJECTION, SOLUTION INTRAVENOUS at 09:44

## 2018-01-16 RX ADMIN — SODIUM CHLORIDE, PRESERVATIVE FREE 80 ML: 5 INJECTION INTRAVENOUS at 09:44

## 2018-01-16 NOTE — LETTER
[facility] [address]  [place] [address]  Phone: [phone]  Fax: [phone]    2018    Re: Austin Garza, : 1940    Mr. Garza is doing great after his EVAR.  He has no pain and incisions have healed well.  The patient is very satisfied with his results this far.  Today he had a CT angiogram to evaluate his repair and we reviewed the results today in clinic.  He plans to start swimming again and working out which is fine with me.  We discussed no very heavy lifting, but otherwise 50 lbs is probably ok.       Physical Examination:  /70 (BP Location: Left arm, Patient Position: Chair, Cuff Size: Adult Large)  Pulse 61  Gen: NAD, alert and oriented x3.  Ambulating on his own.  HEENT: PERRLA, mucous membranes moist.  Abd: soft, no palpable masses. Non-tender.  Percutaneous incisions in bilateral groins are well healed with no masses and no signs of infeciton.  Neuro:  Normal motor and sensory.  Psych: normal mood, affect and judgement.     Imaging:  CTA:  FINDINGS: There is an infrarenal abdominal aortic endograft with  suprarenal fixation. The main body and limbs of the endograft are  patent without significant stenoses. The external iliac arteries and  proximal femoral arteries are patent without significant stenoses.  Maximum anterior-posterior and transverse dimensions of the aneurysm  sac are 6.7 x 8.0 cm versus 6.7 x 7.8 cm at a comparable level on the  previous exam.      There is an endoleak within the posterior aspects of the aneurysm sac  on image 98 series 7. This courses anteriorly and superiorly to lie  anterior to the right limb on image 66 series 7 near the origin of the  inferior mesenteric artery.      Again noted are a few lymph nodes anterior to the aneurysm sac with  mild inflammatory changes. These are not significantly changed.      Again noted is a small gallstone. There is mild splenomegaly. The  spleen measures  15 cm in length with the upper limits of normal  equaling 13 cm. Remaining solid organs in the abdomen appear grossly  unremarkable.      There is a left inguinal hernia containing fat. There are extensive  colonic diverticula, most concentrated in the sigmoid colon.      The renal arteries, superior mesenteric artery, and celiac trunk are  patent without significant stenoses. Again noted is a beaded  appearance of the celiac trunk suggesting fibromuscular dysplasia.      IMPRESSION:   1. New endograft. Endoleak identified. 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 Aneurysm sac size is not significantly changed when  compared to the previous exam.  2. Mild splenomegaly.  3. Colonic diverticulosis.      MELITA CAMARILLO MD     Assessment: 76 yo s/p EVAR for 7.8 cm AAA     Plan:  He will continue his  mg po daily.  We reviewed his imaging in detail today and discussed his type II endoleak and no change in his aneurysm sac size.  I've measured and confirmed with the radiologist that the size of the aneurysm is unchanged from his preoperative size.  It appears the leak is most likely from the YOHAN and at this time we will continue to watch his aneurysm with surveillance CTA.  I did discuss with the patient and his wife that majority of type II endoleaks with no change in sac size resolve on their own.  If the sac does enlarge then we will attempt endovascular embolization to stop the endoleak.  I will see him at the 6 month post-op visit with a CT angiogram of the abdomen and pelvis at that visit.       Milton Cazares MD  Vascular Surgery

## 2018-01-16 NOTE — NURSING NOTE
"Chief Complaint   Patient presents with     RECHECK     History of EVAR 12/4/17; 1 month follow up to 12/19/17 appointment with        Initial /70 (BP Location: Left arm, Patient Position: Chair, Cuff Size: Adult Large)  Pulse 61 Estimated body mass index is 32.08 kg/(m^2) as calculated from the following:    Height as of 1/4/18: 5' 8\" (1.727 m).    Weight as of 1/4/18: 211 lb (95.7 kg).  Medication Reconciliation: complete     Gayle Childers MA   "

## 2018-01-16 NOTE — PROGRESS NOTES
Vascular Surgery Note    Mr. Garza is doing great after his EVAR.  He has no pain and incisions have healed well.  The patient is very satisfied with his results this far.  Today he had a CT angiogram to evaluate his repair and we reviewed the results today in clinic.  He plans to start swimming again and working out which is fine with me.  We discussed no very heavy lifting, but otherwise 50 lbs is probably ok.      Allergies   Allergen Reactions     Ace Inhibitors      cough     Past Medical History:   Diagnosis Date     Diaphragmatic hernia without mention of obstruction or gangrene      Diverticulitis of colon (without mention of hemorrhage)(562.11)      Esophageal reflux      Irritable bowel syndrome      Macular degeneration (senile) of retina, unspecified      Squamous Cell Carcinoma, Back 1988     Type II or unspecified type diabetes mellitus without mention of complication, not stated as uncontrolled      Unspecified essential hypertension      Physical Examination:  /70 (BP Location: Left arm, Patient Position: Chair, Cuff Size: Adult Large)  Pulse 61  Gen: NAD, alert and oriented x3.  Ambulating on his own.  HEENT: PERRLA, mucous membranes moist.  Abd: soft, no palpable masses. Non-tender.  Percutaneous incisions in bilateral groins are well healed with no masses and no signs of infeciton.  Neuro:  Normal motor and sensory.  Psych: normal mood, affect and judgement.    Imaging:  CTA:  FINDINGS: There is an infrarenal abdominal aortic endograft with  suprarenal fixation. The main body and limbs of the endograft are  patent without significant stenoses. The external iliac arteries and  proximal femoral arteries are patent without significant stenoses.  Maximum anterior-posterior and transverse dimensions of the aneurysm  sac are 6.7 x 8.0 cm versus 6.7 x 7.8 cm at a comparable level on the  previous exam.     There is an endoleak within the posterior aspects of the aneurysm sac  on image 98 series  7. This courses anteriorly and superiorly to lie  anterior to the right limb on image 66 series 7 near the origin of the  inferior mesenteric artery.     Again noted are a few lymph nodes anterior to the aneurysm sac with  mild inflammatory changes. These are not significantly changed.     Again noted is a small gallstone. There is mild splenomegaly. The  spleen measures 15 cm in length with the upper limits of normal  equaling 13 cm. Remaining solid organs in the abdomen appear grossly  unremarkable.     There is a left inguinal hernia containing fat. There are extensive  colonic diverticula, most concentrated in the sigmoid colon.     The renal arteries, superior mesenteric artery, and celiac trunk are  patent without significant stenoses. Again noted is a beaded  appearance of the celiac trunk suggesting fibromuscular dysplasia.         IMPRESSION:   1. New endograft. Endoleak identified. 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 Aneurysm sac size is not significantly changed when  compared to the previous exam.  2. Mild splenomegaly.  3. Colonic diverticulosis.     MELITA CAMARILLO MD    Assessment: 78 yo s/p EVAR for 7.8 cm AAA    Plan:  He will continue his  mg po daily.  We reviewed his imaging in detail today and discussed his type II endoleak and no change in his aneurysm sac size.  I've measured and confirmed with the radiologist that the size of the aneurysm is unchanged from his preoperative size.  It appears the leak is most likely from the YOHAN and at this time we will continue to watch his aneurysm with surveillance CTA.  I did discuss with the patient and his wife that majority of type II endoleaks with no change in sac size resolve on their own.  If the sac does enlarge then we will attempt endovascular embolization to stop the endoleak.  I will see him at the 6 month post-op visit with a CT angiogram of the abdomen and pelvis at that visit.      Milton Cazares MD  Vascular Surgery

## 2018-01-16 NOTE — TELEPHONE ENCOUNTER
Not due for a refill.     losartan-hydrochlorothiazide (HYZAAR) 50-12.5 MG per tablet was filled on 11/16/2017, qty 90 with 3 refills.

## 2018-01-16 NOTE — MR AVS SNAPSHOT
After Visit Summary   1/16/2018    Austin Garza    MRN: [id number]           Patient Information     Date Of Birth          1940        Visit Information        Provider Department      1/16/2018 10:30 AM Milton Cazares MD [facility]      Today's Diagnoses     Abdominal aortic aneurysm (AAA) without rupture (H)    -  1       Follow-ups after your visit        Follow-up notes from your care team     Return in about 6 months (around 7/16/2018).      Your next 10 appointments already scheduled     Jan 31, 2018  2:00 PM CST   Return Visit with  ONCOLOGY NURSE   [facility] ([facility])    [facility]  [address]  [facility] [address]   [phone]            Feb 01, 2018 12:45 PM CST   Return Visit with Breana Perry MD   [facility] ([facility])    [facility]  [address]  [facility] [address]   [phone]              Who to contact     If you have questions or need follow up information about today's clinic visit or your schedule please contact [facility] directly at [phone].  Normal or non-critical lab and imaging results will be communicated to you by MyChart, letter or phone within 4 business days after the clinic has received the results. If you do not hear from us within 7 days, please contact the clinic through MyChart or phone. If you have a critical or abnormal lab result, we will notify you by phone as soon as possible.  Submit refill requests through Mygistics or call your pharmacy and they will forward the refill request to us. Please allow 3 business days for your refill to be completed.          Additional Information About Your Visit        RemotiumharElevate Medical Information     Mygistics gives you secure access to your electronic  health record. If you see a primary care provider, you can also send messages to your care team and make appointments. If you have questions, please call your primary care clinic.  If you do not have a primary care provider, please call [phone] and they will assist you.        Care EveryWhere ID     This is your Care EveryWhere ID. This could be used by other organizations to access your [place] medical records  [id number]        Your Vitals Were     Pulse                   61            Blood Pressure from Last 3 Encounters:   01/16/18 122/70   01/04/18 114/74   12/19/17 118/71    Weight from Last 3 Encounters:   01/04/18 211 lb (95.7 kg)   12/18/17 209 lb (94.8 kg)   12/14/17 212 lb 11.2 oz (96.5 kg)              Today, you had the following     No orders found for display       Primary Care Provider Office Phone # Fax #    Sandi Eastman [phone] [address] [place] [address]        Equal Access to Services     [facility] AH: Hadii aad ku hadasho Soomaali, waaxda luqadaha, qaybta kaalmada adeegyada, waxay idiin hayjennifern sylvain bennett . So [facility] [phone].    ATENCIÓN: Si habla español, tiene a edward disposición servicios gratuitos de asistencia lingüística. Ll[facility] [phone].    We comply with applicable federal civil rights laws and [place] laws. We do not discriminate on the basis of race, color, national origin, age, disability, sex, sexual orientation, or gender identity.            Thank you!     Thank you for choosing [facility] [place]  for your care. Our goal is always to provide you with excellent care. Hearing back from our patients is one way we can continue to improve our services. Please take a few minutes to complete the written survey that you may receive in the mail after your visit with us. Thank you!             Your Updated Medication List - Protect others around you: Learn how to safely use, store and throw away your  medicines at www.disposemymeds.org.          This list is accurate as of: 1/16/18 11:23 AM.  Always use your most recent med list.                   Brand Name Dispense Instructions for use Diagnosis    aspirin 325 MG tablet     90 tablet    Take 1 tablet (325 mg) by mouth every evening    Abdominal aortic aneurysm (AAA) without rupture (H)       blood glucose monitoring meter device kit     1 kit    Use to test blood sugars 2-3 times daily as directed. Brand per insurance formulary    Type II or unspecified type diabetes mellitus without mention of complication, not stated as uncontrolled       blood glucose monitoring test strip    ONETOUCH ULTRA    100 each    Test 2-3 times daily as directed, brand per insurance formulary.    Type 2 diabetes mellitus without complication, without long-term current use of insulin (H)       CENTRUM SILVER per tablet      Take 1 tablet by mouth daily        fluticasone 50 MCG/ACT spray    FLONASE    1 Bottle    Spray 2 sprays into both nostrils 2 times daily    Viral URI with cough       glimepiride 1 MG tablet    AMARYL    90 tablet    Take 1 tablet (1 mg) by mouth every morning (before breakfast)    Type 2 diabetes mellitus without complication, without long-term current use of insulin (H)       LIPITOR PO      Take 20 mg by mouth At Bedtime        losartan-hydrochlorothiazide 50-12.5 MG per tablet    HYZAAR    90 tablet    Take 1 tablet by mouth daily    Hypertension goal BP (blood pressure) < 140/90       metFORMIN 1000 MG tablet    GLUCOPHAGE    180 tablet    Take 1 tablet (1,000 mg) by mouth 2 times daily (with meals)    Type 2 diabetes mellitus without complication, without long-term current use of insulin (H)

## 2018-01-17 RX ORDER — LOSARTAN POTASSIUM AND HYDROCHLOROTHIAZIDE 12.5; 5 MG/1; MG/1
TABLET ORAL
Qty: 90 TABLET | Refills: 3 | OUTPATIENT
Start: 2018-01-17

## 2018-01-31 ENCOUNTER — ONCOLOGY VISIT (OUTPATIENT)
Dept: ONCOLOGY | Facility: CLINIC | Age: 78
End: 2018-01-31
Attending: INTERNAL MEDICINE
Payer: MEDICARE

## 2018-01-31 ENCOUNTER — HOSPITAL ENCOUNTER (OUTPATIENT)
Facility: CLINIC | Age: 78
Setting detail: SPECIMEN
Discharge: HOME | End: 2018-01-31
Attending: INTERNAL MEDICINE | Admitting: INTERNAL MEDICINE
Payer: MEDICARE

## 2018-01-31 DIAGNOSIS — C91.10 CLL (CHRONIC LYMPHOCYTIC LEUKEMIA) (H): ICD-10-CM

## 2018-01-31 LAB
ALBUMIN SERPL-MCNC: 4.1 G/DL (ref 3.4–5)
ALP SERPL-CCNC: 61 U/L (ref 40–150)
ALT SERPL W P-5'-P-CCNC: 21 U/L (ref 0–70)
ANION GAP SERPL CALCULATED.3IONS-SCNC: 8 MMOL/L (ref 3–14)
ANISOCYTOSIS BLD QL SMEAR: SLIGHT
AST SERPL W P-5'-P-CCNC: 15 U/L (ref 0–45)
BASOPHILS # BLD AUTO: 0 10E9/L (ref 0–0.2)
BASOPHILS NFR BLD AUTO: 0 %
BILIRUB SERPL-MCNC: 0.7 MG/DL (ref 0.2–1.3)
BUN SERPL-MCNC: 24 MG/DL (ref 7–30)
CALCIUM SERPL-MCNC: 9.1 MG/DL (ref 8.5–10.1)
CHLORIDE SERPL-SCNC: 107 MMOL/L (ref 94–109)
CO2 SERPL-SCNC: 27 MMOL/L (ref 20–32)
CREAT SERPL-MCNC: 1.12 MG/DL (ref 0.66–1.25)
DACRYOCYTES BLD QL SMEAR: SLIGHT
DIFFERENTIAL METHOD BLD: ABNORMAL
EOSINOPHIL # BLD AUTO: 0 10E9/L (ref 0–0.7)
EOSINOPHIL NFR BLD AUTO: 0 %
ERYTHROCYTE [DISTWIDTH] IN BLOOD BY AUTOMATED COUNT: 15.5 % (ref 10–15)
GFR SERPL CREATININE-BSD FRML MDRD: 64 ML/MIN/1.7M2
GLUCOSE SERPL-MCNC: 122 MG/DL (ref 70–99)
HCT VFR BLD AUTO: 32.2 % (ref 40–53)
HGB BLD-MCNC: 10.2 G/DL (ref 13.3–17.7)
LDH SERPL L TO P-CCNC: 203 U/L (ref 85–227)
LYMPHOCYTES # BLD AUTO: 37.1 10E9/L (ref 0.8–5.3)
LYMPHOCYTES NFR BLD AUTO: 91 %
MACROCYTES BLD QL SMEAR: PRESENT
MCH RBC QN AUTO: 33.6 PG (ref 26.5–33)
MCHC RBC AUTO-ENTMCNC: 31.7 G/DL (ref 31.5–36.5)
MCV RBC AUTO: 106 FL (ref 78–100)
MONOCYTES # BLD AUTO: 0 10E9/L (ref 0–1.3)
MONOCYTES NFR BLD AUTO: 0 %
NEUTROPHILS # BLD AUTO: 3.7 10E9/L (ref 1.6–8.3)
NEUTROPHILS NFR BLD AUTO: 9 %
PLATELET # BLD AUTO: 117 10E9/L (ref 150–450)
PLATELET # BLD EST: ABNORMAL 10*3/UL
POTASSIUM SERPL-SCNC: 4.4 MMOL/L (ref 3.4–5.3)
PROT SERPL-MCNC: 7.5 G/DL (ref 6.8–8.8)
RBC # BLD AUTO: 3.04 10E12/L (ref 4.4–5.9)
SODIUM SERPL-SCNC: 142 MMOL/L (ref 133–144)
URATE SERPL-MCNC: 6.5 MG/DL (ref 3.5–7.2)
WBC # BLD AUTO: 40.8 10E9/L (ref 4–11)

## 2018-01-31 PROCEDURE — 80053 COMPREHEN METABOLIC PANEL: CPT | Performed by: INTERNAL MEDICINE

## 2018-01-31 PROCEDURE — 84550 ASSAY OF BLOOD/URIC ACID: CPT | Performed by: INTERNAL MEDICINE

## 2018-01-31 PROCEDURE — 36415 COLL VENOUS BLD VENIPUNCTURE: CPT

## 2018-01-31 PROCEDURE — 85025 COMPLETE CBC W/AUTO DIFF WBC: CPT | Performed by: INTERNAL MEDICINE

## 2018-01-31 PROCEDURE — 83615 LACTATE (LD) (LDH) ENZYME: CPT | Performed by: INTERNAL MEDICINE

## 2018-01-31 NOTE — PROGRESS NOTES
Medical Assistant Note:  Austin Garza presents today for Blood Draw.    Patient seen by provider today: No.   present during visit today: Not Applicable.    Concerns: No Concerns.    Procedure:  Labs drawn: Yes.    Post Assessment:  Labs drawn without difficulty: No    Discharge Plan:  Patient discharged in stable condition accompanied by: self.    Face to Face Time: 10 mins.    Cherry Rodriguez

## 2018-01-31 NOTE — MR AVS SNAPSHOT
After Visit Summary   1/31/2018    Austin Garza    MRN: [id number]           Patient Information     Date Of Birth          1940        Visit Information        Provider Department      1/31/2018 2:00 PM  ONCOLOGY NURSE [facility]        Today's Diagnoses     CLL (chronic lymphocytic leukemia) (H)           Follow-ups after your visit        Your next 10 appointments already scheduled     Feb 01, 2018 12:45 PM CST   Return Visit with Breana Perry MD   [facility] ([facility])    [facility]  [address]  [facility] [id number] [phone]              Who to contact     If you have questions or need follow up information about today's clinic visit or your schedule please contact [facility] directly at [phone].  Normal or non-critical lab and imaging results will be communicated to you by TalentBinhart, letter or phone within 4 business days after the clinic has received the results. If you do not hear from us within 7 days, please contact the clinic through TalentBinhart or phone. If you have a critical or abnormal lab result, we will notify you by phone as soon as possible.  Submit refill requests through AddonTV or call your pharmacy and they will forward the refill request to us. Please allow 3 business days for your refill to be completed.          Additional Information About Your Visit        MyChart Information     AddonTV gives you secure access to your electronic health record. If you see a primary care provider, you can also send messages to your care team and make appointments. If you have questions, please call your primary care clinic.  If you do not have a primary care provider, please call [phone] and they will assist you.        Care EveryWhere ID     This is your Care EveryWhere ID. This could be used by other organizations to access your  [place] medical records  [id number]         Blood Pressure from Last 3 Encounters:   01/16/18 122/70   01/04/18 114/74   12/19/17 118/71    Weight from Last 3 Encounters:   01/04/18 95.7 kg (211 lb)   12/18/17 94.8 kg (209 lb)   12/14/17 96.5 kg (212 lb 11.2 oz)              We Performed the Following     CBC with platelets differential     Comprehensive metabolic panel     Lactate Dehydrogenase     Uric acid        Primary Care Provider Office Phone # Fax #    Sandi Eastman [phone] [address] [place] [address]        Equal Access to Services     [facility]: Hadii aad aurora hadasho Solilian, waaxda luqadaha, qaybta kaalmada adegutierrezda, mina bennett . So [facility] [phone].    ATENCIÓN: Si habla español, tiene a edward disposición servicios gratuitos de asistencia lingüística. Ll[facility] [phone].    We comply with applicable federal civil rights laws and [place] laws. We do not discriminate on the basis of race, color, national origin, age, disability, sex, sexual orientation, or gender identity.            Thank you!     Thank you for choosing [facility]  for your care. Our goal is always to provide you with excellent care. Hearing back from our patients is one way we can continue to improve our services. Please take a few minutes to complete the written survey that you may receive in the mail after your visit with us. Thank you!             Your Updated Medication List - Protect others around you: Learn how to safely use, store and throw away your medicines at www.disposemymeds.org.          This list is accurate as of 1/31/18  2:06 PM.  Always use your most recent med list.                   Brand Name Dispense Instructions for use Diagnosis    aspirin 325 MG tablet     90 tablet    Take 1 tablet (325 mg) by mouth every evening    Abdominal aortic aneurysm (AAA) without rupture (H)       blood glucose monitoring  meter device kit     1 kit    Use to test blood sugars 2-3 times daily as directed. Brand per insurance formulary    Type II or unspecified type diabetes mellitus without mention of complication, not stated as uncontrolled       blood glucose monitoring test strip    ONETOUCH ULTRA    100 each    Test 2-3 times daily as directed, brand per insurance formulary.    Type 2 diabetes mellitus without complication, without long-term current use of insulin (H)       CENTRUM SILVER per tablet      Take 1 tablet by mouth daily        fluticasone 50 MCG/ACT spray    FLONASE    1 Bottle    Spray 2 sprays into both nostrils 2 times daily    Viral URI with cough       glimepiride 1 MG tablet    AMARYL    90 tablet    Take 1 tablet (1 mg) by mouth every morning (before breakfast)    Type 2 diabetes mellitus without complication, without long-term current use of insulin (H)       LIPITOR PO      Take 20 mg by mouth At Bedtime        losartan-hydrochlorothiazide 50-12.5 MG per tablet    HYZAAR    90 tablet    Take 1 tablet by mouth daily    Hypertension goal BP (blood pressure) < 140/90       metFORMIN 1000 MG tablet    GLUCOPHAGE    180 tablet    Take 1 tablet (1,000 mg) by mouth 2 times daily (with meals)    Type 2 diabetes mellitus without complication, without long-term current use of insulin (H)

## 2018-01-31 NOTE — LETTER
1/31/2018         RE: Austin Garza  [address] [address]        Dear Colleague,    Thank you for referring your patient, Austin Garza, to the [facility]. Please see a copy of my visit note below.    Medical Assistant Note:  Austin Garza presents today for Blood Draw.    Patient seen by provider today: No.   present during visit today: Not Applicable.    Concerns: No Concerns.    Procedure:  Labs drawn: Yes.    Post Assessment:  Labs drawn without difficulty: No    Discharge Plan:  Patient discharged in stable condition accompanied by: self.    Face to Face Time: 10 mins.    Cherry Rodriguez            Again, thank you for allowing me to participate in the care of your patient.        Sincerely,        [place] On[facility] Nurse

## 2018-02-01 ENCOUNTER — ONCOLOGY VISIT (OUTPATIENT)
Dept: ONCOLOGY | Facility: CLINIC | Age: 78
End: 2018-02-01
Attending: INTERNAL MEDICINE
Payer: COMMERCIAL

## 2018-02-01 VITALS
RESPIRATION RATE: 16 BRPM | SYSTOLIC BLOOD PRESSURE: 131 MMHG | OXYGEN SATURATION: 98 % | DIASTOLIC BLOOD PRESSURE: 89 MMHG | WEIGHT: 211.4 LBS | HEIGHT: 68 IN | HEART RATE: 93 BPM | TEMPERATURE: 95.5 F | BODY MASS INDEX: 32.04 KG/M2

## 2018-02-01 DIAGNOSIS — C91.10 CLL (CHRONIC LYMPHOCYTIC LEUKEMIA) (H): Primary | ICD-10-CM

## 2018-02-01 PROCEDURE — G0463 HOSPITAL OUTPT CLINIC VISIT: HCPCS

## 2018-02-01 PROCEDURE — 99214 OFFICE O/P EST MOD 30 MIN: CPT | Performed by: INTERNAL MEDICINE

## 2018-02-01 ASSESSMENT — PAIN SCALES - GENERAL: PAINLEVEL: NO PAIN (0)

## 2018-02-01 NOTE — MR AVS SNAPSHOT
After Visit Summary   2/1/2018    Austin Garza    MRN: [id number]           Patient Information     Date Of Birth          1940        Visit Information        Provider Department      2/1/2018 12:45 PM Breana Perry MD [facility]        Today's Diagnoses     CLL (chronic lymphocytic leukemia) (H)    -  1      Care Instructions    Return to clinic in 4-6 weeks with labs done a day prior Scheduled  Melani BRADLEY given to patient            Follow-ups after your visit        Your next 10 appointments already scheduled     Feb 28, 2018  2:00 PM CST   Return Visit with  ONCOLOGY NURSE   [facility] ([facility])    [facility]  [address]  [facility] [address] [phone]            Mar 01, 2018  2:45 PM CST   Return Visit with Breana Perry MD   [facility] ([facility])    [facility]  [address]  [facility] [id number] [phone]              Future tests that were ordered for you today     Open Future Orders        Priority Expected Expires Ordered    CBC with platelets differential Routine 3/1/2018 2/1/2019 2/1/2018    Comprehensive metabolic panel Routine 3/1/2018 2/1/2019 2/1/2018    Lactate Dehydrogenase Routine 3/1/2018 2/1/2019 2/1/2018    Uric acid Routine 3/1/2018 2/1/2019 2/1/2018            Who to contact     If you have questions or need follow up information about today's clinic visit or your schedule please contact [facility] directly at [phone].  Normal or non-critical lab and imaging results will be communicated to you by MyChart, letter or phone within 4 business days after the clinic has received the results. If you do not hear from us within 7 days, please contact the clinic through MyChart or phone. If you have a critical or abnormal lab  "result, we will notify you by phone as soon as possible.  Submit refill requests through Ancanco or call your pharmacy and they will forward the refill request to us. Please allow 3 business days for your refill to be completed.          Additional Information About Your Visit        Weimihart Information     Ancanco gives you secure access to your electronic health record. If you see a primary care provider, you can also send messages to your care team and make appointments. If you have questions, please call your primary care clinic.  If you do not have a primary care provider, please call [phone] and they will assist you.        Care EveryWhere ID     This is your Care EveryWhere ID. This could be used by other organizations to access your [place] medical records  [id number]        Your Vitals Were     Pulse Temperature Respirations Height Pulse Oximetry BMI (Body Mass Index)    93 95.5  F (35.3  C) (Tympanic) 16 1.727 m (5' 8\") 98% 32.14 kg/m2       Blood Pressure from Last 3 Encounters:   02/01/18 131/89   01/16/18 122/70   01/04/18 114/74    Weight from Last 3 Encounters:   02/01/18 95.9 kg (211 lb 6.4 oz)   01/04/18 95.7 kg (211 lb)   12/18/17 94.8 kg (209 lb)               Primary Care Provider Office Phone # Fax #    Sandi Eastman [phone] [address] [place] [address]        Equal Access to Services     [facility]: Hadii gertrude pachecoo Sowillianali, waaxda luqadaha, qaybta kaalmada sylvainyada, mina early. So [facility] [phone].    ATENCIÓN: Si habla español, tiene a edward disposición servicios gratuitos de asistencia lingüística. Anika al [phone].    We comply with applicable federal civil rights laws and [place] laws. We do not discriminate on the basis of race, color, national origin, age, disability, sex, sexual orientation, or gender identity.            Thank you!     Thank you for choosing [facility] " CARE  for your care. Our goal is always to provide you with excellent care. Hearing back from our patients is one way we can continue to improve our services. Please take a few minutes to complete the written survey that you may receive in the mail after your visit with us. Thank you!             Your Updated Medication List - Protect others around you: Learn how to safely use, store and throw away your medicines at www.disposemymeds.org.          This list is accurate as of 2/1/18  1:10 PM.  Always use your most recent med list.                   Brand Name Dispense Instructions for use Diagnosis    aspirin 325 MG tablet     90 tablet    Take 1 tablet (325 mg) by mouth every evening    Abdominal aortic aneurysm (AAA) without rupture (H)       blood glucose monitoring meter device kit     1 kit    Use to test blood sugars 2-3 times daily as directed. Brand per insurance formulary    Type II or unspecified type diabetes mellitus without mention of complication, not stated as uncontrolled       blood glucose monitoring test strip    ONETOUCH ULTRA    100 each    Test 2-3 times daily as directed, brand per insurance formulary.    Type 2 diabetes mellitus without complication, without long-term current use of insulin (H)       CENTRUM SILVER per tablet      Take 1 tablet by mouth daily        fluticasone 50 MCG/ACT spray    FLONASE    1 Bottle    Spray 2 sprays into both nostrils 2 times daily    Viral URI with cough       glimepiride 1 MG tablet    AMARYL    90 tablet    Take 1 tablet (1 mg) by mouth every morning (before breakfast)    Type 2 diabetes mellitus without complication, without long-term current use of insulin (H)       LIPITOR PO      Take 20 mg by mouth At Bedtime        losartan-hydrochlorothiazide 50-12.5 MG per tablet    HYZAAR    90 tablet    Take 1 tablet by mouth daily    Hypertension goal BP (blood pressure) < 140/90       metFORMIN 1000 MG tablet    GLUCOPHAGE    180 tablet    Take 1 tablet (1,000  mg) by mouth 2 times daily (with meals)    Type 2 diabetes mellitus without complication, without long-term current use of insulin (H)

## 2018-02-01 NOTE — NURSING NOTE
"Oncology Rooming Note    February 1, 2018 12:40 PM   Austin Garza is a 77 year old male who presents for:    Chief Complaint   Patient presents with     Oncology Clinic Visit     Follow up - CLL (chronic lymphocytic leukemia)      Initial Vitals: Resp 16  Ht 1.727 m (5' 8\")  Wt 95.9 kg (211 lb 6.4 oz)  BMI 32.14 kg/m2 Estimated body mass index is 32.14 kg/(m^2) as calculated from the following:    Height as of this encounter: 1.727 m (5' 8\").    Weight as of this encounter: 95.9 kg (211 lb 6.4 oz). Body surface area is 2.15 meters squared.  No Pain (0) Comment: Data Unavailable   No LMP for male patient.  Allergies reviewed: Yes  Medications reviewed: Yes    Medications: Medication refills not needed today.  Pharmacy name entered into Sodbuster:    [facility] - [facility] [address] [facility] [address] [place], [place] - [address] [facility]    Clinical concerns: Follow up    8 minutes for nursing intake (face to face time)     Cherry Rodriguez CMA     DISCHARGE PLAN:  Next appointments: See patient instruction section  Departure Mode: Ambulatory  Accompanied by: wife  0 minutes for nursing discharge (face to face time)   Cherry Rodriguez CMA                  "

## 2018-02-01 NOTE — PROGRESS NOTES
[facility]    Hematology/Oncology Established Patient Note      Today's Date: 2/01/18    Reason for Follow-up: CLL      HISTORY OF PRESENT ILLNESS: Austin Garza is a 77 year old male with PMHx of DMII, HTN who presented with leukocytosis.  In November 2017, he was found to have elevated WBC of 61.7K, hemoglobin of 10.4, and platelet of 115K.  There were no other CBC results available between 2010 and 2017.  Prior to 2010, he had normal CBC, with normal to mild anemia, and mild thrombocytopenia.   He was asymptomatic.    He underwent bone marrow biopsy on 11/21/17, which was consistent with chronic lymphocytic leukemia/small lymphocytic lymphoma, with 13% ringed sideroblasts identified.  The presence of increased numbers of ringed sideroblasts raises the possibility of an associated myelodysplastic syndrome.  Dysplastic changes are not identified though.  Many cases exhibiting ringed sideroblasts are metabolic origin, without significant risk of progression to acute leukemia, and these typically do not show dysplastic morphology as is the case with this specimen.  15% ringed sideroblasts are required for a diagnosis for RARS, which this specimen does not meet.  Flow cytometry is also consistent with CLL/SLL.  Cytogenetics are still pending.    CT scan on 11/29/17 shows mildly enlarged left axillary lymph node and periaortic lymph nodes in the retroperitoneum of the abdomen.  Spleen is also enlarged.    On his staging CT scan for CLL, he was incidentally found to have a large infrarenal abdominal aortic aneurysm, measuring 7.6 cm.  He was seen by Dr. Thomas, and he underwent EVAR on 12/4/17.       INTERIM HISTORY: Austin is here for follow-up with his wife today.  He says that he has been feeling well, other than some fatigue, which he attributes to shoveling snow.  He says that he is overall feeling back to normal.        REVIEW OF SYSTEMS:   14 point ROS was reviewed and is negative other  than as noted above in HPI.       HOME MEDICATIONS:  Current Outpatient Prescriptions   Medication Sig Dispense Refill     fluticasone (FLONASE) 50 MCG/ACT spray Spray 2 sprays into both nostrils 2 times daily 1 Bottle 11     aspirin 325 MG tablet Take 1 tablet (325 mg) by mouth every evening 90 tablet 3     Atorvastatin Calcium (LIPITOR PO) Take 20 mg by mouth At Bedtime       Multiple Vitamins-Minerals (CENTRUM SILVER) per tablet Take 1 tablet by mouth daily       glimepiride (AMARYL) 1 MG tablet Take 1 tablet (1 mg) by mouth every morning (before breakfast) 90 tablet 3     metFORMIN (GLUCOPHAGE) 1000 MG tablet Take 1 tablet (1,000 mg) by mouth 2 times daily (with meals) 180 tablet 3     losartan-hydrochlorothiazide (HYZAAR) 50-12.5 MG per tablet Take 1 tablet by mouth daily 90 tablet 3     blood glucose monitoring (ONE TOUCH ULTRA) test strip Test 2-3 times daily as directed, brand per insurance formulary. 100 each 0     Blood Glucose Monitoring Suppl (ONE TOUCH ULTRA 2) W/DEVICE KIT Use to test blood sugars 2-3 times daily as directed. Brand per insurance formulary   1 kit 0         ALLERGIES:  Allergies   Allergen Reactions     Ace Inhibitors      cough         PAST MEDICAL HISTORY:  Past Medical History:   Diagnosis Date     Diaphragmatic hernia without mention of obstruction or gangrene      Diverticulitis of colon (without mention of hemorrhage)(562.11)      Esophageal reflux      Irritable bowel syndrome      Macular degeneration (senile) of retina, unspecified      Squamous Cell Carcinoma, Back 1988     Type II or unspecified type diabetes mellitus without mention of complication, not stated as uncontrolled      Unspecified essential hypertension          PAST SURGICAL HISTORY:  Past Surgical History:   Procedure Laterality Date     APPENDECTOMY  1966     BONE MARROW BIOPSY, BONE SPECIMEN, NEEDLE/TROCAR N/A 11/21/2017    Procedure: BIOPSY BONE MARROW;  BONE MARROW BIOPSY;  Surgeon: Shady Garcia,  "MD;  Location:  GI     COLONOSCOPY       ENDOVASCULAR REPAIR ANEURYSM ABDOMINAL AORTA N/A 2017    Procedure: ENDOVASCULAR REPAIR ANEURYSM ABDOMINAL AORTA;  ENDOVASCULAR REPAIR ANEURYSM ABDOMINAL AORTA;  Surgeon: Milton Cazares MD;  Location:  OR     SURGICAL HISTORY OF -       Squamous cell skin cancer resection - back     SURGICAL HISTORY OF -       skin tags resection     SURGICAL HISTORY OF -   2001    stress thall. normal     SURGICAL HISTORY OF -   2002    colonoscopy     SURGICAL HISTORY OF -       Fistulotomy with marsupialization for repair of fisture in ano         SOCIAL HISTORY:  Social History     Social History     Marital status:      Spouse name: librado     Number of children: 0     Years of education: 17     Occupational History     retired      Social History Main Topics     Smoking status: Former Smoker     Packs/day: 0.50     Years: 20.00     Quit date: 1975     Smokeless tobacco: Former User     Alcohol use 6.0 - 8.4 oz/week     10 - 14 Standard drinks or equivalent per week      Comment: 2 drinks a week     Drug use: No     Sexual activity: No     Other Topics Concern     Not on file     Social History Narrative   He quit smoking in .  He drinks 1-3 glasses of wine a night with dinner.  He is retired, and used to work as a .  He lives with his wife in [place].  His cousin had lung cancer and  around age 69.  Otherwise, he denies family history of cancer.        FAMILY HISTORY:  Family History   Problem Relation Age of Onset     CEREBROVASCULAR DISEASE Father      x 2     Hypertension Mother      C.A.D. Mother      CANCER Mother      skin     Eye Disorder Mother      glaucoma     Prostate Cancer No family hx of      Cancer - colorectal No family hx of          PHYSICAL EXAM:  Vital signs:  /89  Pulse 93  Temp 95.5  F (35.3  C) (Tympanic)  Resp 16  Ht 1.727 m (5' 8\")  Wt 95.9 kg (211 lb 6.4 oz)  SpO2 98%  BMI 32.14 kg/m2 "   GENERAL/CONSTITUTIONAL: No acute distress. Accompanied by wife.  EYES: No scleral icterus.  RESPIRATORY: Clear to auscultation bilaterally. No crackles or wheezing.   CARDIOVASCULAR: Regular rate and rhythm without murmurs, gallops, or rubs.  GASTROINTESTINAL: No tenderness. The patient has normal bowel sounds. No guarding.  No distention.  MUSCULOSKELETAL: Warm and well-perfused.  NEUROLOGIC: Alert, oriented, answers questions appropriately.  INTEGUMENTARY: No jaundice.      LABS:  CBC RESULTS:   Recent Labs   Lab Test  01/31/18   1402   WBC  40.8*   RBC  3.04*   HGB  10.2*   HCT  32.2*   MCV  106*   MCH  33.6*   MCHC  31.7   RDW  15.5*   PLT  117*     Recent Labs   Lab Test  01/31/18   1402  01/04/18   0954   NA  142  137   POTASSIUM  4.4  4.7   CHLORIDE  107  106   CO2  27  27   ANIONGAP  8  4   GLC  122*  287*   BUN  24  24   CR  1.12  1.13   MAGDALENO  9.1  8.6     Lab Results   Component Value Date    AST 15 01/31/2018     Lab Results   Component Value Date    ALT 21 01/31/2018     Lab Results   Component Value Date    BILICONJ 0.0 04/29/2005      Lab Results   Component Value Date    BILITOTAL 0.7 01/31/2018     Lab Results   Component Value Date    ALBUMIN 4.1 01/31/2018     Lab Results   Component Value Date    PROTTOTAL 7.5 01/31/2018      Lab Results   Component Value Date    ALKPHOS 61 01/31/2018     Component      Latest Ref Rng & Units 1/31/2018   Lactate Dehydrogenase      85 - 227 U/L 203   Uric Acid      3.5 - 7.2 mg/dL 6.5         PATHOLOGY:  Bone marrow biopsy 11/21/17:  FINAL DIAGNOSIS:   Peripheral blood demonstrating macrocytic anemia with thrombocytopenia     Bone marrow trephine biopsy and aspirate demonstrating hypercellular   bone marrow involved by chronic lymphocytic leukemia/small lymphocytic   lymphoma, 13% ringed sideroblasts identified (see comment)     COMMENT:   Immunophenotyping studies demonstrate a kappa monotypic CD5 positive   B-cell population most compatible with chronic  lymphocytic leukemia.  An   immunoperoxidase stain for cyclin D1 is pending.  Dysplastic changes are   not identified within the erythroid series.  However, the presence of   increased numbers of ringed sideroblasts raises the possibility of an   associated myelodysplastic syndrome, particularly in light of the noted   increased mean red cell volume and associated thrombocytopenia.  The   thrombocytopenia may also reflect reduced numbers of megakaryocytes   given the involvement of marrow by chronic lymphocytic leukemia.  Many   cases exhibiting ringed sideroblasts are metabolic in origin, without   significant risk of progression to acute leukemia, and these typically   do not show dysplastic morphology as is the case with the current   specimen.  Moreover, 15% ringed sideroblasts are required for a   diagnosis of  RARS. Cytogenetic studies are pending.     Flow  INTERPRETATION:   Bone Marrow, Left:        CD5 positive Kappa monotypic B cells (82%)     COMMENT:   The clonal B-cell population present in this specimen has a similar   immunophenotype as reported previously in the blood from this patient   ([id number]).  The immunophenotypic findings are suggestive of marrow   involvement by small lymphocytic lymphoma/chronic lymphocytic leukemia   (SLL/CLL). Large cells may not survive specimen processing.  There may   be peripheral blood contamination. Final interpretation requires   correlation with results of other ancillary studies, morphologic and   clinical features.     ISCN:   46,XY,del(13)(q12q14)[2]/45,X,-Y[3]/46,XY[15].nuc   alton(ATMx2,TP53x1)[4/200],([id number],XPVF0b9)[21/200]     INTERPRETATION:   Two (10%) of the metaphase cells analyzed comprised a clone   characterized by a deletion within the proximal long arm of a chromosome   13 as the sole karyotypic abnormality. Additionally, three (15%)   metaphases had loss of the Y chromosome as the sole abnormality.     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     Loss of the Y chromosome, as seen in this case, has been reported both   as an acquired clonal abnormality associated with hematologic (primarily   myeloid) disorders and also as a clinically insignificant finding   associated with the normal aging process in adult males. When presenting   as a clonal abnormality, loss of Y is typically seen in addition to   other cytogenetic abnormalities. This patient's age and the absence of   other chromosomal abnormalities suggest that the loss of Y represents an   age-related finding in this case.       IMAGING:  CT c/a/p 11/29/17:  FINDINGS:   Chest: Possible minor fissure lymph node on series 3, image 45 along  the right minor fissure measuring 0.3 cm. Anterior right upper lobe  nodule measures 0.3 cm on image 40. A lateral right lower lobe nodule  measures 0.4 cm on image 47. Bibasilar atelectasis is noted. No  infiltrate or consolidation. No pleural or pericardial fluid. Heart is  normal in size. Esophagus is unremarkable. A few small mediastinal and  hilar lymph nodes are present, but none exceed size criteria for  abnormality. Bilateral axillary lymph nodes are also present. The  largest is in the left axilla measuring 1.7 x 1.1 cm on series 2,  image 18. Coronary artery calcification is present. Aorta demonstrates  scattered calcified plaque without aneurysm.     Abdomen: A large infrarenal abdominal aortic aneurysm is noted  measuring 7.6 x 6.7 cm on series 2, image 80 and contains a moderate  amount of mural thrombus. There is slight haziness around the margin  of the aneurysm sac of uncertain significance as a few scattered lymph  nodes are noted in this area. Periaortic inflammation is possible. No  obvious retroperitoneal hemorrhage at this time. A periaortic node on  series 2, image 76 near the superior  aspect of the aneurysm measures  1.9 x 1.4 cm. Aneurysm does not extend down into the common iliac  arteries. Probable calcified gallstone in the gallbladder on series 2,  image 69. Gallbladder is otherwise within normal limits. The liver,  pancreas, adrenal glands and kidneys are unremarkable. No  hydronephrosis or renal calculi. Spleen is prominent in size without  focal mass measuring 15.7 cm in AP dimension. Tiny cyst posterior  spleen is noted on series 2, image 64. No evidence of bowel  obstruction or diverticulitis.     Pelvis: The bladder and rectum are unremarkable. No enlarged pelvic  lymph nodes or free fluid. Bone window examination demonstrates  degenerative spine changes.         IMPRESSION:  1. Mildly enlarged left axillary lymph node and periaortic lymph nodes  in the retroperitoneum of the abdomen. Spleen is also enlarged.  Findings would be consistent with patient's underlying diagnosis of  CLL. Follow up as clinically warranted.  2. Infrarenal abdominal aortic aneurysm measuring up to 7.6 cm in  diameter. Mild indistinctness of the aneurysm wall could be due to  inflammation. No retroperitoneal hemorrhage is currently evident.  Recommend followup with vascular surgery or interventional radiology  for further assessment.        ASSESSMENT/PLAN:  Austin Garza is a 77 year old male with:    1) CLL/SLL: BLACKWOOD stage III, with lymphocytosis, lymphadenopathy, splenomegaly, and anemia.  He has mild thrombocytopenia as well, but is above 100K.  He presented with leukocytosis with WBC of 61.7K, hemoglobin of 10.4, and platelet count of 115K on diagnosis.  Bone marrow biopsy and flow cytometry confirmed CLL/SLL.  CT scan shows mildly enlarged left axillary lymph node and periaortic lymph nodes in the retroperitoneum of the abdomen, with enlarged spleen.      At this point, his CBC appears stable.  He has elevated WBC, but is not necessarily an indication to start treatment.  If he has increased doubling  time of his lymphocyte count and progressive anemia and thrombobocytopenia, then we may consider treatment.  If he has worsening lymphadenopathy that is causing symptoms or organ dysfunction, then we may consider treatment.  He does not have B-symptoms currently.  In addition, with his large aortic aneurysm and just had an EVAR done, I would not start treatment for CLL at this point anyway.  He will focus on the aneurysm repair and healing from that for now.  I recommended that we watch and wait for now.  Austin agrees with this, and we will continue to monitor his CBC periodically.      -CBC from yesterday reviewed: WBC remains elevated, but trended down some/stable from the previous check.  His hemoglobin has trended up.  Platelet count remains stable and above 100K.  -continue monitor CBC for now  -RTC in ~4-6 weeks with repeat labs    2) Abdominal aortic aneurysm: measures up to 7.6 cm on CT scan, incidentally found.  He underwent EVAR on 12/4/17.  -follow-up with vascular surgery; he is to follow-up with repeat CTA in 6 months    3) Diabetes, hypertension:  -following with PCP      I spent a total of 25 minutes with the patient, with over >50% of the time in counseling and/or coordination of care.      Breana Perry MD  Hematology/Oncology  [facility]

## 2018-02-01 NOTE — LETTER
2/1/2018         RE: Austin aGrza  [address] [facility] [address]        Dear Colleague,    Thank you for referring your patient, Austin Garza, to the [facility]CER CARE. Please see a copy of my visit note below.    [facility]    Hematology/Oncology Established Patient Note      Today's Date: 2/01/18    Reason for Follow-up: CLL      HISTORY OF PRESENT ILLNESS: Austin Garza is a 77 year old male with PMHx of DMII, HTN who presented with leukocytosis.  In November 2017, he was found to have elevated WBC of 61.7K, hemoglobin of 10.4, and platelet of 115K.  There were no other CBC results available between 2010 and 2017.  Prior to 2010, he had normal CBC, with normal to mild anemia, and mild thrombocytopenia.   He was asymptomatic.    He underwent bone marrow biopsy on 11/21/17, which was consistent with chronic lymphocytic leukemia/small lymphocytic lymphoma, with 13% ringed sideroblasts identified.  The presence of increased numbers of ringed sideroblasts raises the possibility of an associated myelodysplastic syndrome.  Dysplastic changes are not identified though.  Many cases exhibiting ringed sideroblasts are metabolic origin, without significant risk of progression to acute leukemia, and these typically do not show dysplastic morphology as is the case with this specimen.  15% ringed sideroblasts are required for a diagnosis for RARS, which this specimen does not meet.  Flow cytometry is also consistent with CLL/SLL.  Cytogenetics are still pending.    CT scan on 11/29/17 shows mildly enlarged left axillary lymph node and periaortic lymph nodes in the retroperitoneum of the abdomen.  Spleen is also enlarged.    On his staging CT scan for CLL, he was incidentally found to have a large infrarenal abdominal aortic aneurysm, measuring 7.6 cm.  He was seen by Dr. Thomas, and he underwent EVAR on 12/4/17.       INTERIM HISTORY: Austin is here for  follow-up with his wife today.  He says that he has been feeling well, other than some fatigue, which he attributes to shoveling snow.  He says that he is overall feeling back to normal.        REVIEW OF SYSTEMS:   14 point ROS was reviewed and is negative other than as noted above in HPI.       HOME MEDICATIONS:  Current Outpatient Prescriptions   Medication Sig Dispense Refill     fluticasone (FLONASE) 50 MCG/ACT spray Spray 2 sprays into both nostrils 2 times daily 1 Bottle 11     aspirin 325 MG tablet Take 1 tablet (325 mg) by mouth every evening 90 tablet 3     Atorvastatin Calcium (LIPITOR PO) Take 20 mg by mouth At Bedtime       Multiple Vitamins-Minerals (CENTRUM SILVER) per tablet Take 1 tablet by mouth daily       glimepiride (AMARYL) 1 MG tablet Take 1 tablet (1 mg) by mouth every morning (before breakfast) 90 tablet 3     metFORMIN (GLUCOPHAGE) 1000 MG tablet Take 1 tablet (1,000 mg) by mouth 2 times daily (with meals) 180 tablet 3     losartan-hydrochlorothiazide (HYZAAR) 50-12.5 MG per tablet Take 1 tablet by mouth daily 90 tablet 3     blood glucose monitoring (ONE TOUCH ULTRA) test strip Test 2-3 times daily as directed, brand per insurance formulary. 100 each 0     Blood Glucose Monitoring Suppl (ONE TOUCH ULTRA 2) W/DEVICE KIT Use to test blood sugars 2-3 times daily as directed. Brand per insurance formulary   1 kit 0         ALLERGIES:  Allergies   Allergen Reactions     Ace Inhibitors      cough         PAST MEDICAL HISTORY:  Past Medical History:   Diagnosis Date     Diaphragmatic hernia without mention of obstruction or gangrene      Diverticulitis of colon (without mention of hemorrhage)(562.11)      Esophageal reflux      Irritable bowel syndrome      Macular degeneration (senile) of retina, unspecified      Squamous Cell Carcinoma, Back 1988     Type II or unspecified type diabetes mellitus without mention of complication, not stated as uncontrolled      Unspecified essential hypertension           PAST SURGICAL HISTORY:  Past Surgical History:   Procedure Laterality Date     APPENDECTOMY  1966     BONE MARROW BIOPSY, BONE SPECIMEN, NEEDLE/TROCAR N/A 2017    Procedure: BIOPSY BONE MARROW;  BONE MARROW BIOPSY;  Surgeon: Shady Garcia MD;  Location:  GI     COLONOSCOPY       ENDOVASCULAR REPAIR ANEURYSM ABDOMINAL AORTA N/A 2017    Procedure: ENDOVASCULAR REPAIR ANEURYSM ABDOMINAL AORTA;  ENDOVASCULAR REPAIR ANEURYSM ABDOMINAL AORTA;  Surgeon: Milton Cazares MD;  Location:  OR     SURGICAL HISTORY OF -       Squamous cell skin cancer resection - back     SURGICAL HISTORY OF -       skin tags resection     SURGICAL HISTORY OF -   2001    stress thall. normal     SURGICAL HISTORY OF -   2002    colonoscopy     SURGICAL HISTORY OF -       Fistulotomy with marsupialization for repair of fisture in ano         SOCIAL HISTORY:  Social History     Social History     Marital status:      Spouse name: librado     Number of children: 0     Years of education: 17     Occupational History     retired      Social History Main Topics     Smoking status: Former Smoker     Packs/day: 0.50     Years: 20.00     Quit date: 1975     Smokeless tobacco: Former User     Alcohol use 6.0 - 8.4 oz/week     10 - 14 Standard drinks or equivalent per week      Comment: 2 drinks a week     Drug use: No     Sexual activity: No     Other Topics Concern     Not on file     Social History Narrative   He quit smoking in .  He drinks 1-3 glasses of wine a night with dinner.  He is retired, and used to work as a .  He lives with his wife in [place].  His cousin had lung cancer and  around age 69.  Otherwise, he denies family history of cancer.        FAMILY HISTORY:  Family History   Problem Relation Age of Onset     CEREBROVASCULAR DISEASE Father      x 2     Hypertension Mother      C.A.D. Mother      CANCER Mother      skin     Eye Disorder Mother      glaucoma      "Prostate Cancer No family hx of      Cancer - colorectal No family hx of          PHYSICAL EXAM:  Vital signs:  /89  Pulse 93  Temp 95.5  F (35.3  C) (Tympanic)  Resp 16  Ht 1.727 m (5' 8\")  Wt 95.9 kg (211 lb 6.4 oz)  SpO2 98%  BMI 32.14 kg/m2   GENERAL/CONSTITUTIONAL: No acute distress. Accompanied by wife.  EYES: No scleral icterus.  RESPIRATORY: Clear to auscultation bilaterally. No crackles or wheezing.   CARDIOVASCULAR: Regular rate and rhythm without murmurs, gallops, or rubs.  GASTROINTESTINAL: No tenderness. The patient has normal bowel sounds. No guarding.  No distention.  MUSCULOSKELETAL: Warm and well-perfused.  NEUROLOGIC: Alert, oriented, answers questions appropriately.  INTEGUMENTARY: No jaundice.      LABS:  CBC RESULTS:   Recent Labs   Lab Test  01/31/18   1402   WBC  40.8*   RBC  3.04*   HGB  10.2*   HCT  32.2*   MCV  106*   MCH  33.6*   MCHC  31.7   RDW  15.5*   PLT  117*     Recent Labs   Lab Test  01/31/18   1402  01/04/18   0954   NA  142  137   POTASSIUM  4.4  4.7   CHLORIDE  107  106   CO2  27  27   ANIONGAP  8  4   GLC  122*  287*   BUN  24  24   CR  1.12  1.13   MAGDALENO  9.1  8.6     Lab Results   Component Value Date    AST 15 01/31/2018     Lab Results   Component Value Date    ALT 21 01/31/2018     Lab Results   Component Value Date    BILICONJ 0.0 04/29/2005      Lab Results   Component Value Date    BILITOTAL 0.7 01/31/2018     Lab Results   Component Value Date    ALBUMIN 4.1 01/31/2018     Lab Results   Component Value Date    PROTTOTAL 7.5 01/31/2018      Lab Results   Component Value Date    ALKPHOS 61 01/31/2018     Component      Latest Ref Rng & Units 1/31/2018   Lactate Dehydrogenase      85 - 227 U/L 203   Uric Acid      3.5 - 7.2 mg/dL 6.5         PATHOLOGY:  Bone marrow biopsy 11/21/17:  FINAL DIAGNOSIS:   Peripheral blood demonstrating macrocytic anemia with thrombocytopenia     Bone marrow trephine biopsy and aspirate demonstrating hypercellular   bone marrow " involved by chronic lymphocytic leukemia/small lymphocytic   lymphoma, 13% ringed sideroblasts identified (see comment)     COMMENT:   Immunophenotyping studies demonstrate a kappa monotypic CD5 positive   B-cell population most compatible with chronic lymphocytic leukemia.  An   immunoperoxidase stain for cyclin D1 is pending.  Dysplastic changes are   not identified within the erythroid series.  However, the presence of   increased numbers of ringed sideroblasts raises the possibility of an   associated myelodysplastic syndrome, particularly in light of the noted   increased mean red cell volume and associated thrombocytopenia.  The   thrombocytopenia may also reflect reduced numbers of megakaryocytes   given the involvement of marrow by chronic lymphocytic leukemia.  Many   cases exhibiting ringed sideroblasts are metabolic in origin, without   significant risk of progression to acute leukemia, and these typically   do not show dysplastic morphology as is the case with the current   specimen.  Moreover, 15% ringed sideroblasts are required for a   diagnosis of  RARS. Cytogenetic studies are pending.     Flow  INTERPRETATION:   Bone Marrow, Left:        CD5 positive Kappa monotypic B cells (82%)     COMMENT:   The clonal B-cell population present in this specimen has a similar   immunophenotype as reported previously in the blood from this patient   ([id number]).  The immunophenotypic findings are suggestive of marrow   involvement by small lymphocytic lymphoma/chronic lymphocytic leukemia   (SLL/CLL). Large cells may not survive specimen processing.  There may   be peripheral blood contamination. Final interpretation requires   correlation with results of other ancillary studies, morphologic and   clinical features.     ISCN:   46,XY,del(13)(q12q14)[2]/45,X,-Y[3]/46,XY[15].nuc   alton(ATMx2,TP53x1)[4/200],([id number],BUNT0r9)[21/200]     INTERPRETATION:   Two (10%) of the metaphase cells analyzed comprised a clone    characterized by a deletion within the proximal long arm of a chromosome   13 as the sole karyotypic abnormality. Additionally, three (15%)   metaphases had loss of the Y chromosome as the sole abnormality.     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     Loss of the Y chromosome, as seen in this case, has been reported both   as an acquired clonal abnormality associated with hematologic (primarily   myeloid) disorders and also as a clinically insignificant finding   associated with the normal aging process in adult males. When presenting   as a clonal abnormality, loss of Y is typically seen in addition to   other cytogenetic abnormalities. This patient's age and the absence of   other chromosomal abnormalities suggest that the loss of Y represents an   age-related finding in this case.       IMAGING:  CT c/a/p 11/29/17:  FINDINGS:   Chest: Possible minor fissure lymph node on series 3, image 45 along  the right minor fissure measuring 0.3 cm. Anterior right upper lobe  nodule measures 0.3 cm on image 40. A lateral right lower lobe nodule  measures 0.4 cm on image 47. Bibasilar atelectasis is noted. No  infiltrate or consolidation. No pleural or pericardial fluid. Heart is  normal in size. Esophagus is unremarkable. A few small mediastinal and  hilar lymph nodes are present, but none exceed size criteria for  abnormality. Bilateral axillary lymph nodes are also present. The  largest is in the left axilla measuring 1.7 x 1.1 cm on series 2,  image 18. Coronary artery calcification is present. Aorta demonstrates  scattered calcified plaque without aneurysm.     Abdomen: A large infrarenal abdominal aortic aneurysm is noted  measuring 7.6 x 6.7 cm on series 2, image 80 and contains a moderate  amount of mural thrombus. There is slight haziness around the  margin  of the aneurysm sac of uncertain significance as a few scattered lymph  nodes are noted in this area. Periaortic inflammation is possible. No  obvious retroperitoneal hemorrhage at this time. A periaortic node on  series 2, image 76 near the superior aspect of the aneurysm measures  1.9 x 1.4 cm. Aneurysm does not extend down into the common iliac  arteries. Probable calcified gallstone in the gallbladder on series 2,  image 69. Gallbladder is otherwise within normal limits. The liver,  pancreas, adrenal glands and kidneys are unremarkable. No  hydronephrosis or renal calculi. Spleen is prominent in size without  focal mass measuring 15.7 cm in AP dimension. Tiny cyst posterior  spleen is noted on series 2, image 64. No evidence of bowel  obstruction or diverticulitis.     Pelvis: The bladder and rectum are unremarkable. No enlarged pelvic  lymph nodes or free fluid. Bone window examination demonstrates  degenerative spine changes.         IMPRESSION:  1. Mildly enlarged left axillary lymph node and periaortic lymph nodes  in the retroperitoneum of the abdomen. Spleen is also enlarged.  Findings would be consistent with patient's underlying diagnosis of  CLL. Follow up as clinically warranted.  2. Infrarenal abdominal aortic aneurysm measuring up to 7.6 cm in  diameter. Mild indistinctness of the aneurysm wall could be due to  inflammation. No retroperitoneal hemorrhage is currently evident.  Recommend followup with vascular surgery or interventional radiology  for further assessment.        ASSESSMENT/PLAN:  Austin Garza is a 77 year old male with:    1) CLL/SLL: BLACKWOOD stage III, with lymphocytosis, lymphadenopathy, splenomegaly, and anemia.  He has mild thrombocytopenia as well, but is above 100K.  He presented with leukocytosis with WBC of 61.7K, hemoglobin of 10.4, and platelet count of 115K on diagnosis.  Bone marrow biopsy and flow cytometry confirmed CLL/SLL.  CT scan shows mildly enlarged left  axillary lymph node and periaortic lymph nodes in the retroperitoneum of the abdomen, with enlarged spleen.      At this point, his CBC appears stable.  He has elevated WBC, but is not necessarily an indication to start treatment.  If he has increased doubling time of his lymphocyte count and progressive anemia and thrombobocytopenia, then we may consider treatment.  If he has worsening lymphadenopathy that is causing symptoms or organ dysfunction, then we may consider treatment.  He does not have B-symptoms currently.  In addition, with his large aortic aneurysm and just had an EVAR done, I would not start treatment for CLL at this point anyway.  He will focus on the aneurysm repair and healing from that for now.  I recommended that we watch and wait for now.  Austin agrees with this, and we will continue to monitor his CBC periodically.      -CBC from yesterday reviewed: WBC remains elevated, but trended down some/stable from the previous check.  His hemoglobin has trended up.  Platelet count remains stable and above 100K.  -continue monitor CBC for now  -RTC in ~4-6 weeks with repeat labs    2) Abdominal aortic aneurysm: measures up to 7.6 cm on CT scan, incidentally found.  He underwent EVAR on 12/4/17.  -follow-up with vascular surgery; he is to follow-up with repeat CTA in 6 months    3) Diabetes, hypertension:  -following with PCP      I spent a total of 25 minutes with the patient, with over >50% of the time in counseling and/or coordination of care.      Breana Perry MD  Hematology/Oncology  [facility]      Again, thank you for allowing me to participate in the care of your patient.        Sincerely,        Breana Perry MD

## 2018-02-01 NOTE — PATIENT INSTRUCTIONS
Return to clinic in 4-6 weeks with labs done a day prior Scheduled  Melani BRADLEY given to patient

## 2018-03-02 ENCOUNTER — ONCOLOGY VISIT (OUTPATIENT)
Dept: ONCOLOGY | Facility: CLINIC | Age: 78
End: 2018-03-02
Attending: INTERNAL MEDICINE
Payer: MEDICARE

## 2018-03-02 ENCOUNTER — HOSPITAL ENCOUNTER (OUTPATIENT)
Facility: CLINIC | Age: 78
Setting detail: SPECIMEN
Discharge: HOME | End: 2018-03-02
Attending: INTERNAL MEDICINE | Admitting: INTERNAL MEDICINE
Payer: MEDICARE

## 2018-03-02 DIAGNOSIS — C91.10 CLL (CHRONIC LYMPHOCYTIC LEUKEMIA) (H): ICD-10-CM

## 2018-03-02 LAB
ALBUMIN SERPL-MCNC: 4.2 G/DL (ref 3.4–5)
ALP SERPL-CCNC: 63 U/L (ref 40–150)
ALT SERPL W P-5'-P-CCNC: 22 U/L (ref 0–70)
ANION GAP SERPL CALCULATED.3IONS-SCNC: 5 MMOL/L (ref 3–14)
ANISOCYTOSIS BLD QL SMEAR: SLIGHT
AST SERPL W P-5'-P-CCNC: 17 U/L (ref 0–45)
BASOPHILS # BLD AUTO: 0 10E9/L (ref 0–0.2)
BASOPHILS NFR BLD AUTO: 0 %
BILIRUB SERPL-MCNC: 0.5 MG/DL (ref 0.2–1.3)
BUN SERPL-MCNC: 27 MG/DL (ref 7–30)
CALCIUM SERPL-MCNC: 8.9 MG/DL (ref 8.5–10.1)
CHLORIDE SERPL-SCNC: 106 MMOL/L (ref 94–109)
CO2 SERPL-SCNC: 28 MMOL/L (ref 20–32)
CREAT SERPL-MCNC: 1.22 MG/DL (ref 0.66–1.25)
DACRYOCYTES BLD QL SMEAR: SLIGHT
DIFFERENTIAL METHOD BLD: ABNORMAL
EOSINOPHIL # BLD AUTO: 0.4 10E9/L (ref 0–0.7)
EOSINOPHIL NFR BLD AUTO: 1 %
ERYTHROCYTE [DISTWIDTH] IN BLOOD BY AUTOMATED COUNT: 15.1 % (ref 10–15)
GFR SERPL CREATININE-BSD FRML MDRD: 58 ML/MIN/1.7M2
GLUCOSE SERPL-MCNC: 120 MG/DL (ref 70–99)
HCT VFR BLD AUTO: 34.5 % (ref 40–53)
HGB BLD-MCNC: 10.9 G/DL (ref 13.3–17.7)
LDH SERPL L TO P-CCNC: 217 U/L (ref 85–227)
LYMPHOCYTES # BLD AUTO: 34 10E9/L (ref 0.8–5.3)
LYMPHOCYTES NFR BLD AUTO: 89 %
MACROCYTES BLD QL SMEAR: PRESENT
MCH RBC QN AUTO: 33.2 PG (ref 26.5–33)
MCHC RBC AUTO-ENTMCNC: 31.6 G/DL (ref 31.5–36.5)
MCV RBC AUTO: 105 FL (ref 78–100)
METAMYELOCYTES # BLD: 0.4 10E9/L
METAMYELOCYTES NFR BLD MANUAL: 1 %
MONOCYTES # BLD AUTO: 1.5 10E9/L (ref 0–1.3)
MONOCYTES NFR BLD AUTO: 4 %
NEUTROPHILS # BLD AUTO: 1.9 10E9/L (ref 1.6–8.3)
NEUTROPHILS NFR BLD AUTO: 5 %
OVALOCYTES BLD QL SMEAR: SLIGHT
PLATELET # BLD AUTO: 107 10E9/L (ref 150–450)
PLATELET # BLD EST: ABNORMAL 10*3/UL
POTASSIUM SERPL-SCNC: 4.8 MMOL/L (ref 3.4–5.3)
PROT SERPL-MCNC: 7.6 G/DL (ref 6.8–8.8)
RBC # BLD AUTO: 3.28 10E12/L (ref 4.4–5.9)
SODIUM SERPL-SCNC: 139 MMOL/L (ref 133–144)
URATE SERPL-MCNC: 7.3 MG/DL (ref 3.5–7.2)
WBC # BLD AUTO: 38.2 10E9/L (ref 4–11)

## 2018-03-02 PROCEDURE — 84550 ASSAY OF BLOOD/URIC ACID: CPT | Performed by: INTERNAL MEDICINE

## 2018-03-02 PROCEDURE — 85025 COMPLETE CBC W/AUTO DIFF WBC: CPT | Performed by: INTERNAL MEDICINE

## 2018-03-02 PROCEDURE — 80053 COMPREHEN METABOLIC PANEL: CPT | Performed by: INTERNAL MEDICINE

## 2018-03-02 PROCEDURE — 36415 COLL VENOUS BLD VENIPUNCTURE: CPT

## 2018-03-02 PROCEDURE — 83615 LACTATE (LD) (LDH) ENZYME: CPT | Performed by: INTERNAL MEDICINE

## 2018-03-02 NOTE — PROGRESS NOTES
Medical Assistant Note:  Austin Garza presents today for Blood Draw.    Patient seen by provider today: No.   present during visit today: Not Applicable.    Concerns: No Concerns.    Procedure:  Labs drawn: Yes.    Post Assessment:  Labs drawn without difficulty: Yes.    Discharge Plan:  Patient discharged in stable condition accompanied by: self.    Face to Face Time: 10 mins.    Cherry Rodriguez

## 2018-03-02 NOTE — LETTER
3/2/2018         RE: Austin Garza  [address] [facility] [address]        Dear Colleague,    Thank you for referring your patient, Austin Garza, to the [facility] CANCER [facility]ARE. Please see a copy of my visit note below.    Medical Assistant Note:  Austin Garza presents today for Blood Draw.    Patient seen by provider today: No.   present during visit today: Not Applicable.    Concerns: No Concerns.    Procedure:  Labs drawn: Yes.    Post Assessment:  Labs drawn without difficulty: Yes.    Discharge Plan:  Patient discharged in stable condition accompanied by: self.    Face to Face Time: 10 mins.    Cherry Rodriguez              Again, thank you for allowing me to participate in the care of your patient.        Sincerely,        [place] Oncology Nurse

## 2018-03-02 NOTE — MR AVS SNAPSHOT
After Visit Summary   3/2/2018    Austin Garza    MRN: [id number]           Patient Information     Date Of Birth          1940        Visit Information        Provider Department      3/2/2018 2:00 PM RH ONCOLOGY NURSE [facility]        Today's Diagnoses     CLL (chronic lymphocytic leukemia) (H)           Follow-ups after your visit        Your next 10 appointments already scheduled     Mar 02, 2018  2:00 PM CST   Return Visit with  ONCOLOGY NURSE   [facility] ([facility])    [facility]  [address]  [facility] [id number] [phone]            Mar 06, 2018  1:00 PM CST   Return Visit with Breana Perry MD   [facility] ([facility])    [facility]  [address]  [facility] [id number] [phone]              Who to contact     If you have questions or need follow up information about today's clinic visit or your schedule please contact [facility] directly at [phone].  Normal or non-critical lab and imaging results will be communicated to you by [employer], letter or phone within 4 business days after the clinic has received the results. If you do not hear from us within 7 days, please contact the clinic through [employer] or phone. If you have a critical or abnormal lab result, we will notify you by phone as soon as possible.  Submit refill requests through [employer] or call your pharmacy and they will forward the refill request to us. Please allow 3 business days for your refill to be completed.          Additional Information About Your Visit        [employer] Information     [employer] gives you secure access to your electronic health record. If you see a primary care provider, you can also send messages to your care team and make appointments. If you have questions, please call your primary care  clinic.  If you do not have a primary care provider, please call [phone] and they will assist you.        Care EveryWhere ID     This is your Care EveryWhere ID. This could be used by other organizations to access your [facility] medical records  [id number]         Blood Pressure from Last 3 Encounters:   02/01/18 131/89   01/16/18 122/70   01/04/18 114/74    Weight from Last 3 Encounters:   02/01/18 95.9 kg (211 lb 6.4 oz)   01/04/18 95.7 kg (211 lb)   12/18/17 94.8 kg (209 lb)              We Performed the Following     CBC with platelets differential     Comprehensive metabolic panel     Lactate Dehydrogenase     Uric acid        Primary Care Provider Office Phone # Fax #    Sandi Eastman [phone] [address] [place] [address]        Equal Access to Services     : Hadii gertrude simon hadasho Solilian, waaxda luqadaha, qaybta kaalmada adesheriyada, mina pastor haygavino bennett . So [facility] [phone].    ATENCIÓN: Si habla español, tiene a edward disposición servicios gratuitos de asistencia lingüística. Llame al [phone].    We comply with applicable federal civil rights laws and [place] laws. We do not discriminate on the basis of race, color, national origin, age, disability, sex, sexual orientation, or gender identity.            Thank you!     Thank you for choosing [facility]  for your care. Our goal is always to provide you with excellent care. Hearing back from our patients is one way we can continue to improve our services. Please take a few minutes to complete the written survey that you may receive in the mail after your visit with us. Thank you!             Your Updated Medication List - Protect others around you: Learn how to safely use, store and throw away your medicines at www.disposemymeds.org.          This list is accurate as of 3/2/18  1:48 PM.  Always use your most recent med list.                   Brand  Name Dispense Instructions for use Diagnosis    aspirin 325 MG tablet     90 tablet    Take 1 tablet (325 mg) by mouth every evening    Abdominal aortic aneurysm (AAA) without rupture (H)       blood glucose monitoring meter device kit     1 kit    Use to test blood sugars 2-3 times daily as directed. Brand per insurance formulary    Type II or unspecified type diabetes mellitus without mention of complication, not stated as uncontrolled       blood glucose monitoring test strip    ONETOUCH ULTRA    100 each    Test 2-3 times daily as directed, brand per insurance formulary.    Type 2 diabetes mellitus without complication, without long-term current use of insulin (H)       CENTRUM SILVER per tablet      Take 1 tablet by mouth daily        fluticasone 50 MCG/ACT spray    FLONASE    1 Bottle    Spray 2 sprays into both nostrils 2 times daily    Viral URI with cough       glimepiride 1 MG tablet    AMARYL    90 tablet    Take 1 tablet (1 mg) by mouth every morning (before breakfast)    Type 2 diabetes mellitus without complication, without long-term current use of insulin (H)       LIPITOR PO      Take 20 mg by mouth At Bedtime        losartan-hydrochlorothiazide 50-12.5 MG per tablet    HYZAAR    90 tablet    Take 1 tablet by mouth daily    Hypertension goal BP (blood pressure) < 140/90       metFORMIN 1000 MG tablet    GLUCOPHAGE    180 tablet    Take 1 tablet (1,000 mg) by mouth 2 times daily (with meals)    Type 2 diabetes mellitus without complication, without long-term current use of insulin (H)

## 2018-03-06 ENCOUNTER — ONCOLOGY VISIT (OUTPATIENT)
Dept: ONCOLOGY | Facility: CLINIC | Age: 78
End: 2018-03-06
Attending: INTERNAL MEDICINE
Payer: MEDICARE

## 2018-03-06 VITALS
OXYGEN SATURATION: 99 % | WEIGHT: 209 LBS | SYSTOLIC BLOOD PRESSURE: 154 MMHG | HEART RATE: 82 BPM | HEIGHT: 68 IN | RESPIRATION RATE: 22 BRPM | TEMPERATURE: 97.9 F | BODY MASS INDEX: 31.67 KG/M2 | DIASTOLIC BLOOD PRESSURE: 96 MMHG

## 2018-03-06 DIAGNOSIS — C91.10 CLL (CHRONIC LYMPHOCYTIC LEUKEMIA) (H): Primary | ICD-10-CM

## 2018-03-06 PROCEDURE — G0463 HOSPITAL OUTPT CLINIC VISIT: HCPCS

## 2018-03-06 PROCEDURE — 99213 OFFICE O/P EST LOW 20 MIN: CPT | Performed by: INTERNAL MEDICINE

## 2018-03-06 ASSESSMENT — PAIN SCALES - GENERAL: PAINLEVEL: NO PAIN (0)

## 2018-03-06 NOTE — PATIENT INSTRUCTIONS
Return to clinic with Dr. Perry at the [facility] in 2 months with labs a day prior

## 2018-03-06 NOTE — PROGRESS NOTES
"Oncology Rooming Note    March 6, 2018 12:57 PM   Austin Garza is a 77 year old male who presents for:    Chief Complaint   Patient presents with     Oncology Clinic Visit     RETURN-CLL (chronic lymphocytic leukemia)      Initial Vitals: BP (!) 154/96 (BP Location: Left arm, Patient Position: Chair, Cuff Size: Adult Regular)  Pulse 82  Temp 97.9  F (36.6  C)  Resp 22  Ht 1.727 m (5' 8\")  Wt 94.8 kg (209 lb)  SpO2 99%  BMI 31.78 kg/m2 Estimated body mass index is 31.78 kg/(m^2) as calculated from the following:    Height as of this encounter: 1.727 m (5' 8\").    Weight as of this encounter: 94.8 kg (209 lb). Body surface area is 2.13 meters squared.  No Pain (0) Comment: Data Unavailable   No LMP for male patient.  Allergies reviewed: Yes  Medications reviewed: Yes    Medications: Medication refills not needed today.  Pharmacy name entered into [employer]:    [facility] [address] [facility] [address] [place], [place] - [address] [facility]    Clinical concerns: none     5 minutes for nursing intake (face to face time)     Og Longoria CMA            "

## 2018-03-06 NOTE — MR AVS SNAPSHOT
After Visit Summary   3/6/2018    Austin Garza    MRN: [id number]           Patient Information     Date Of Birth          1940        Visit Information        Provider Department      3/6/2018 1:00 PM Breana Perry MD [facility]        Today's Diagnoses     CLL (chronic lymphocytic leukemia) (H)    -  1      Care Instructions    Return to clinic with Dr. Perry at the [facility] in 2 months with labs a day prior          Follow-ups after your visit        Future tests that were ordered for you today     Open Future Orders        Priority Expected Expires Ordered    CBC with platelets differential Routine 5/6/2018 3/6/2019 3/6/2018    Comprehensive metabolic panel Routine 5/6/2018 3/6/2019 3/6/2018    Lactate Dehydrogenase Routine 5/6/2018 3/6/2019 3/6/2018    Uric acid Routine 5/6/2018 3/6/2019 3/6/2018            Who to contact     If you have questions or need follow up information about today's clinic visit or your schedule please contact [facility] directly at [phone].  Normal or non-critical lab and imaging results will be communicated to you by Sensus Experiencehart, letter or phone within 4 business days after the clinic has received the results. If you do not hear from us within 7 days, please contact the clinic through Viewsyt or phone. If you have a critical or abnormal lab result, we will notify you by phone as soon as possible.  Submit refill requests through [email or web address] or call your pharmacy and they will forward the refill request to us. Please allow 3 business days for your refill to be completed.          Additional Information About Your Visit        MyChart Information     [email or web address] gives you secure access to your electronic health record. If you see a primary care provider, you can also send messages to your care team and make appointments. If you have questions, please call your primary care clinic.  If you do not have a primary care provider,  "please call [phone] and they will assist you.        Care EveryWhere ID     This is your Care EveryWhere ID. This could be used by other organizations to access your [facility] medical records  [id number]        Your Vitals Were     Pulse Temperature Respirations Height Pulse Oximetry BMI (Body Mass Index)    82 97.9  F (36.6  C) 22 1.727 m (5' 8\") 99% 31.78 kg/m2       Blood Pressure from Last 3 Encounters:   03/06/18 (!) 154/96   02/01/18 131/89   01/16/18 122/70    Weight from Last 3 Encounters:   03/06/18 94.8 kg (209 lb)   02/01/18 95.9 kg (211 lb 6.4 oz)   01/04/18 95.7 kg (211 lb)               Primary Care Provider Office Phone # Fax #    Sandi Eastman [phone] [address] [place] [address]        Equal Access to Services     [facility]: Hadii aad ku hadasho Soomaali, waaxda luqadaha, qaybta kaalmada adeegyada, waxay gracielain mayra bennett . So [facility] [phone].    ATENCIÓN: Si habla español, tiene a edward disposición servicios gratuitos de asistencia lingüística. Ll[facility] [phone].    We comply with applicable federal civil rights laws and [place] laws. We do not discriminate on the basis of race, color, national origin, age, disability, sex, sexual orientation, or gender identity.            Thank you!     Thank you for choosing [facility]  for your care. Our goal is always to provide you with excellent care. Hearing back from our patients is one way we can continue to improve our services. Please take a few minutes to complete the written survey that you may receive in the mail after your visit with us. Thank you!             Your Updated Medication List - Protect others around you: Learn how to safely use, store and throw away your medicines at www.disposemymeds.org.          This list is accurate as of 3/6/18  1:28 PM.  Always use your most recent med list.                   Brand Name Dispense Instructions for use Diagnosis "    aspirin 325 MG tablet     90 tablet    Take 1 tablet (325 mg) by mouth every evening    Abdominal aortic aneurysm (AAA) without rupture (H)       blood glucose monitoring meter device kit     1 kit    Use to test blood sugars 2-3 times daily as directed. Brand per insurance formulary    Type II or unspecified type diabetes mellitus without mention of complication, not stated as uncontrolled       blood glucose monitoring test strip    ONETOUCH ULTRA    100 each    Test 2-3 times daily as directed, brand per insurance formulary.    Type 2 diabetes mellitus without complication, without long-term current use of insulin (H)       CENTRUM SILVER per tablet      Take 1 tablet by mouth daily        fluticasone 50 MCG/ACT spray    FLONASE    1 Bottle    Spray 2 sprays into both nostrils 2 times daily    Viral URI with cough       glimepiride 1 MG tablet    AMARYL    90 tablet    Take 1 tablet (1 mg) by mouth every morning (before breakfast)    Type 2 diabetes mellitus without complication, without long-term current use of insulin (H)       LIPITOR PO      Take 20 mg by mouth At Bedtime        losartan-hydrochlorothiazide 50-12.5 MG per tablet    HYZAAR    90 tablet    Take 1 tablet by mouth daily    Hypertension goal BP (blood pressure) < 140/90       metFORMIN 1000 MG tablet    GLUCOPHAGE    180 tablet    Take 1 tablet (1,000 mg) by mouth 2 times daily (with meals)    Type 2 diabetes mellitus without complication, without long-term current use of insulin (H)

## 2018-03-06 NOTE — LETTER
"    3/6/2018         RE: Austin Garza  [address] [address]        Dear Colleague,    Thank you for referring your patient, Austin Garza, to the [facility]. Please see a copy of my visit note below.    Oncology Rooming Note    March 6, 2018 12:57 PM   Austin Garza is a 77 year old male who presents for:    Chief Complaint   Patient presents with     Oncology Clinic Visit     RETURN-CLL (chronic lymphocytic leukemia)      Initial Vitals: BP (!) 154/96 (BP Location: Left arm, Patient Position: Chair, Cuff Size: Adult Regular)  Pulse 82  Temp 97.9  F (36.6  C)  Resp 22  Ht 1.727 m (5' 8\")  Wt 94.8 kg (209 lb)  SpO2 99%  BMI 31.78 kg/m2 Estimated body mass index is 31.78 kg/(m^2) as calculated from the following:    Height as of this encounter: 1.727 m (5' 8\").    Weight as of this encounter: 94.8 kg (209 lb). Body surface area is 2.13 meters squared.  No Pain (0) Comment: Data Unavailable   No LMP for male patient.  Allergies reviewed: Yes  Medications reviewed: Yes    Medications: Medication refills not needed today.  Pharmacy name entered into [employer]:    [facility] - [facility] [address] [facility] - [place], [place] - [address]    Clinical concerns: none     5 minutes for nursing intake (face to face time)     Og Longoria CMA              [facility]    Hematology/Oncology Established Patient Note      Today's Date: 3/06/18    Reason for Follow-up: CLL      HISTORY OF PRESENT ILLNESS: Austin Garza is a 77 year old male with PMHx of DMII, HTN who presented with leukocytosis.  In November 2017, he was found to have elevated WBC of 61.7K, hemoglobin of 10.4, and platelet of 115K.  There were no other CBC results available between 2010 and 2017.  Prior to 2010, he had normal CBC, with normal to mild anemia, and mild thrombocytopenia.   He was asymptomatic.    He underwent bone " marrow biopsy on 11/21/17, which was consistent with chronic lymphocytic leukemia/small lymphocytic lymphoma, with 13% ringed sideroblasts identified.  The presence of increased numbers of ringed sideroblasts raises the possibility of an associated myelodysplastic syndrome.  Dysplastic changes are not identified though.  Many cases exhibiting ringed sideroblasts are metabolic origin, without significant risk of progression to acute leukemia, and these typically do not show dysplastic morphology as is the case with this specimen.  15% ringed sideroblasts are required for a diagnosis for RARS, which this specimen does not meet.  Flow cytometry is also consistent with CLL/SLL.  Cytogenetics are still pending.    CT scan on 11/29/17 shows mildly enlarged left axillary lymph node and periaortic lymph nodes in the retroperitoneum of the abdomen.  Spleen is also enlarged.    On his staging CT scan for CLL, he was incidentally found to have a large infrarenal abdominal aortic aneurysm, measuring 7.6 cm.  He was seen by Dr. Thomas, and he underwent EVAR on 12/4/17.       INTERIM HISTORY: Austin is here for follow-up today.  He says that he has been feeling fine.  He denies any complaints today.          REVIEW OF SYSTEMS:   14 point ROS was reviewed and is negative other than as noted above in HPI.       HOME MEDICATIONS:  Current Outpatient Prescriptions   Medication Sig Dispense Refill     fluticasone (FLONASE) 50 MCG/ACT spray Spray 2 sprays into both nostrils 2 times daily 1 Bottle 11     aspirin 325 MG tablet Take 1 tablet (325 mg) by mouth every evening 90 tablet 3     Atorvastatin Calcium (LIPITOR PO) Take 20 mg by mouth At Bedtime       Multiple Vitamins-Minerals (CENTRUM SILVER) per tablet Take 1 tablet by mouth daily       glimepiride (AMARYL) 1 MG tablet Take 1 tablet (1 mg) by mouth every morning (before breakfast) 90 tablet 3     metFORMIN (GLUCOPHAGE) 1000 MG tablet Take 1 tablet (1,000 mg) by mouth 2 times  daily (with meals) 180 tablet 3     losartan-hydrochlorothiazide (HYZAAR) 50-12.5 MG per tablet Take 1 tablet by mouth daily 90 tablet 3     blood glucose monitoring (ONE TOUCH ULTRA) test strip Test 2-3 times daily as directed, brand per insurance formulary. 100 each 0     Blood Glucose Monitoring Suppl (ONE TOUCH ULTRA 2) W/DEVICE KIT Use to test blood sugars 2-3 times daily as directed. Brand per insurance formulary   1 kit 0         ALLERGIES:  Allergies   Allergen Reactions     Ace Inhibitors      cough         PAST MEDICAL HISTORY:  Past Medical History:   Diagnosis Date     Diaphragmatic hernia without mention of obstruction or gangrene      Diverticulitis of colon (without mention of hemorrhage)(562.11)      Esophageal reflux      Irritable bowel syndrome      Macular degeneration (senile) of retina, unspecified      Squamous Cell Carcinoma, Back 1988     Type II or unspecified type diabetes mellitus without mention of complication, not stated as uncontrolled      Unspecified essential hypertension          PAST SURGICAL HISTORY:  Past Surgical History:   Procedure Laterality Date     APPENDECTOMY  1966     BONE MARROW BIOPSY, BONE SPECIMEN, NEEDLE/TROCAR N/A 11/21/2017    Procedure: BIOPSY BONE MARROW;  BONE MARROW BIOPSY;  Surgeon: Shady Garcia MD;  Location:  GI     COLONOSCOPY       ENDOVASCULAR REPAIR ANEURYSM ABDOMINAL AORTA N/A 12/4/2017    Procedure: ENDOVASCULAR REPAIR ANEURYSM ABDOMINAL AORTA;  ENDOVASCULAR REPAIR ANEURYSM ABDOMINAL AORTA;  Surgeon: Milton Cazares MD;  Location:  OR     SURGICAL HISTORY OF -   1985-90    Squamous cell skin cancer resection - back     SURGICAL HISTORY OF -   1998    skin tags resection     SURGICAL HISTORY OF -   2/2001    stress thall. normal     SURGICAL HISTORY OF -   5/2002    colonoscopy     SURGICAL HISTORY OF -   2007    Fistulotomy with marsupialization for repair of fisture in ano         SOCIAL HISTORY:  Social History     Social  "History     Marital status:      Spouse name: librado     Number of children: 0     Years of education: 17     Occupational History     retired      Social History Main Topics     Smoking status: Former Smoker     Packs/day: 0.50     Years: 20.00     Quit date: 1975     Smokeless tobacco: Former User     Alcohol use 6.0 - 8.4 oz/week     10 - 14 Standard drinks or equivalent per week      Comment: 2 drinks a week     Drug use: No     Sexual activity: No     Other Topics Concern     Not on file     Social History Narrative   He quit smoking in .  He drinks 1-3 glasses of wine a night with dinner.  He is retired, and used to work as a .  He lives with his wife in [place].  His cousin had lung cancer and  around age 69.  Otherwise, he denies family history of cancer.        FAMILY HISTORY:  Family History   Problem Relation Age of Onset     CEREBROVASCULAR DISEASE Father      x 2     Hypertension Mother      C.A.D. Mother      CANCER Mother      skin     Eye Disorder Mother      glaucoma     Prostate Cancer No family hx of      Cancer - colorectal No family hx of          PHYSICAL EXAM:  Vital signs:  BP (!) 154/96 (BP Location: Left arm, Patient Position: Chair, Cuff Size: Adult Regular)  Pulse 82  Temp 97.9  F (36.6  C)  Resp 22  Ht 1.727 m (5' 8\")  Wt 94.8 kg (209 lb)  SpO2 99%  BMI 31.78 kg/m2   GENERAL/CONSTITUTIONAL: No acute distress.  EYES: No scleral icterus.  MUSCULOSKELETAL: Warm and well-perfused.  NEUROLOGIC: Alert, oriented, answers questions appropriately.  INTEGUMENTARY: No jaundice.      LABS:  CBC RESULTS:   Recent Labs   Lab Test  18   1254   WBC  38.2*   RBC  3.28*   HGB  10.9*   HCT  34.5*   MCV  105*   MCH  33.2*   MCHC  31.6   RDW  15.1*   PLT  107*     Recent Labs   Lab Test  18   1254  18   1402   NA  139  142   POTASSIUM  4.8  4.4   CHLORIDE  106  107   CO2  28  27   ANIONGAP  5  8   GLC  120*  122*   BUN  27  24   CR  1.22  1.12   MAGDALENO  8.9  9.1 "     Lab Results   Component Value Date    AST 17 03/02/2018     Lab Results   Component Value Date    ALT 22 03/02/2018     Lab Results   Component Value Date    BILICONJ 0.0 04/29/2005      Lab Results   Component Value Date    BILITOTAL 0.5 03/02/2018     Lab Results   Component Value Date    ALBUMIN 4.2 03/02/2018     Lab Results   Component Value Date    PROTTOTAL 7.6 03/02/2018      Lab Results   Component Value Date    ALKPHOS 63 03/02/2018       Component      Latest Ref Rng & Units 3/2/2018   Lactate Dehydrogenase      85 - 227 U/L 217   Uric Acid      3.5 - 7.2 mg/dL 7.3 (H)         PATHOLOGY:  Bone marrow biopsy 11/21/17:  FINAL DIAGNOSIS:   Peripheral blood demonstrating macrocytic anemia with thrombocytopenia     Bone marrow trephine biopsy and aspirate demonstrating hypercellular   bone marrow involved by chronic lymphocytic leukemia/small lymphocytic   lymphoma, 13% ringed sideroblasts identified (see comment)     COMMENT:   Immunophenotyping studies demonstrate a kappa monotypic CD5 positive   B-cell population most compatible with chronic lymphocytic leukemia.  An   immunoperoxidase stain for cyclin D1 is pending.  Dysplastic changes are   not identified within the erythroid series.  However, the presence of   increased numbers of ringed sideroblasts raises the possibility of an   associated myelodysplastic syndrome, particularly in light of the noted   increased mean red cell volume and associated thrombocytopenia.  The   thrombocytopenia may also reflect reduced numbers of megakaryocytes   given the involvement of marrow by chronic lymphocytic leukemia.  Many   cases exhibiting ringed sideroblasts are metabolic in origin, without   significant risk of progression to acute leukemia, and these typically   do not show dysplastic morphology as is the case with the current   specimen.  Moreover, 15% ringed sideroblasts are required for a   diagnosis of  RARS. Cytogenetic studies are pending.      Flow  INTERPRETATION:   Bone Marrow, Left:        CD5 positive Kappa monotypic B cells (82%)     COMMENT:   The clonal B-cell population present in this specimen has a similar   immunophenotype as reported previously in the blood from this patient   ([id number]).  The immunophenotypic findings are suggestive of marrow   involvement by small lymphocytic lymphoma/chronic lymphocytic leukemia   (SLL/CLL). Large cells may not survive specimen processing.  There may   be peripheral blood contamination. Final interpretation requires   correlation with results of other ancillary studies, morphologic and   clinical features.     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 Additionally, three (15%)   metaphases had loss of the Y chromosome as the sole abnormality.     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     Loss of the Y chromosome, as seen in this case, has been reported both   as an acquired clonal abnormality associated with hematologic (primarily   myeloid) disorders and also as a clinically insignificant finding   associated with the normal aging process in adult males. When presenting   as a clonal abnormality, loss of Y is typically seen in addition to   other cytogenetic abnormalities. This patient's age and the absence of   other chromosomal abnormalities suggest that the loss of Y represents an   age-related finding in this case.       IMAGING:  CT c/a/p 11/29/17:  FINDINGS:   Chest: Possible minor fissure lymph node on series 3, image 45 along  the right minor fissure measuring 0.3 cm.  Anterior right upper lobe  nodule measures 0.3 cm on image 40. A lateral right lower lobe nodule  measures 0.4 cm on image 47. Bibasilar atelectasis is noted. No  infiltrate or consolidation. No pleural or pericardial fluid. Heart is  normal in size. Esophagus is unremarkable. A few small mediastinal and  hilar lymph nodes are present, but none exceed size criteria for  abnormality. Bilateral axillary lymph nodes are also present. The  largest is in the left axilla measuring 1.7 x 1.1 cm on series 2,  image 18. Coronary artery calcification is present. Aorta demonstrates  scattered calcified plaque without aneurysm.     Abdomen: A large infrarenal abdominal aortic aneurysm is noted  measuring 7.6 x 6.7 cm on series 2, image 80 and contains a moderate  amount of mural thrombus. There is slight haziness around the margin  of the aneurysm sac of uncertain significance as a few scattered lymph  nodes are noted in this area. Periaortic inflammation is possible. No  obvious retroperitoneal hemorrhage at this time. A periaortic node on  series 2, image 76 near the superior aspect of the aneurysm measures  1.9 x 1.4 cm. Aneurysm does not extend down into the common iliac  arteries. Probable calcified gallstone in the gallbladder on series 2,  image 69. Gallbladder is otherwise within normal limits. The liver,  pancreas, adrenal glands and kidneys are unremarkable. No  hydronephrosis or renal calculi. Spleen is prominent in size without  focal mass measuring 15.7 cm in AP dimension. Tiny cyst posterior  spleen is noted on series 2, image 64. No evidence of bowel  obstruction or diverticulitis.     Pelvis: The bladder and rectum are unremarkable. No enlarged pelvic  lymph nodes or free fluid. Bone window examination demonstrates  degenerative spine changes.         IMPRESSION:  1. Mildly enlarged left axillary lymph node and periaortic lymph nodes  in the retroperitoneum of the abdomen. Spleen is also enlarged.  Findings  would be consistent with patient's underlying diagnosis of  CLL. Follow up as clinically warranted.  2. Infrarenal abdominal aortic aneurysm measuring up to 7.6 cm in  diameter. Mild indistinctness of the aneurysm wall could be due to  inflammation. No retroperitoneal hemorrhage is currently evident.  Recommend followup with vascular surgery or interventional radiology  for further assessment.        ASSESSMENT/PLAN:  Austin Garza is a 77 year old male with:    1) CLL/SLL: BLACKWOOD stage III, with lymphocytosis, lymphadenopathy, splenomegaly, and anemia.  He has mild thrombocytopenia as well, but is above 100K.  He presented with leukocytosis with WBC of 61.7K, hemoglobin of 10.4, and platelet count of 115K on diagnosis.  Bone marrow biopsy and flow cytometry confirmed CLL/SLL.  CT scan shows mildly enlarged left axillary lymph node and periaortic lymph nodes in the retroperitoneum of the abdomen, with enlarged spleen.      At this point, his CBC appears stable.  He has elevated WBC, but is not necessarily an indication to start treatment.  If he has increased doubling time of his lymphocyte count and progressive anemia and thrombobocytopenia, then we may consider treatment.  If he has worsening lymphadenopathy that is causing symptoms or organ dysfunction, then we may consider treatment.  He does not have B-symptoms currently.  In addition, with his large aortic aneurysm and just had an EVAR done, I would not start treatment for CLL at this point anyway.  He will focus on the aneurysm repair and healing from that for now.  I recommended that we watch and wait for now.  Austin agrees with this, and we will continue to monitor his CBC periodically.      -CBC from 3/2/18 reviewed: WBC remains elevated, but trended down some/stable from the previous check.  His hemoglobin has trended up.  Platelet count remains stable and above 100K.  -continue monitor CBC for now  -RTC in 2 months with repeat labs    2) Abdominal  aortic aneurysm: measures up to 7.6 cm on CT scan, incidentally found.  He underwent EVAR on 12/4/17.  -follow-up with vascular surgery; he is to follow-up with repeat CTA around July 2018    3) Diabetes, hypertension:  -following with PCP      I spent a total of 15 minutes with the patient, with over >50% of the time in counseling and/or coordination of care.      Breana Perry MD  Hematology/Oncology  [facility]      Again, thank you for allowing me to participate in the care of your patient.        Sincerely,        Breana Perry MD

## 2018-03-06 NOTE — PROGRESS NOTES
[facility]    Hematology/Oncology Established Patient Note      Today's Date: 3/06/18    Reason for Follow-up: CLL      HISTORY OF PRESENT ILLNESS: Austin Garza is a 77 year old male with PMHx of DMII, HTN who presented with leukocytosis.  In November 2017, he was found to have elevated WBC of 61.7K, hemoglobin of 10.4, and platelet of 115K.  There were no other CBC results available between 2010 and 2017.  Prior to 2010, he had normal CBC, with normal to mild anemia, and mild thrombocytopenia.   He was asymptomatic.    He underwent bone marrow biopsy on 11/21/17, which was consistent with chronic lymphocytic leukemia/small lymphocytic lymphoma, with 13% ringed sideroblasts identified.  The presence of increased numbers of ringed sideroblasts raises the possibility of an associated myelodysplastic syndrome.  Dysplastic changes are not identified though.  Many cases exhibiting ringed sideroblasts are metabolic origin, without significant risk of progression to acute leukemia, and these typically do not show dysplastic morphology as is the case with this specimen.  15% ringed sideroblasts are required for a diagnosis for RARS, which this specimen does not meet.  Flow cytometry is also consistent with CLL/SLL.  Cytogenetics are still pending.    CT scan on 11/29/17 shows mildly enlarged left axillary lymph node and periaortic lymph nodes in the retroperitoneum of the abdomen.  Spleen is also enlarged.    On his staging CT scan for CLL, he was incidentally found to have a large infrarenal abdominal aortic aneurysm, measuring 7.6 cm.  He was seen by Dr. Thomas, and he underwent EVAR on 12/4/17.       INTERIM HISTORY: Austin is here for follow-up today.  He says that he has been feeling fine.  He denies any complaints today.          REVIEW OF SYSTEMS:   14 point ROS was reviewed and is negative other than as noted above in HPI.       HOME MEDICATIONS:  Current Outpatient Prescriptions    Medication Sig Dispense Refill     fluticasone (FLONASE) 50 MCG/ACT spray Spray 2 sprays into both nostrils 2 times daily 1 Bottle 11     aspirin 325 MG tablet Take 1 tablet (325 mg) by mouth every evening 90 tablet 3     Atorvastatin Calcium (LIPITOR PO) Take 20 mg by mouth At Bedtime       Multiple Vitamins-Minerals (CENTRUM SILVER) per tablet Take 1 tablet by mouth daily       glimepiride (AMARYL) 1 MG tablet Take 1 tablet (1 mg) by mouth every morning (before breakfast) 90 tablet 3     metFORMIN (GLUCOPHAGE) 1000 MG tablet Take 1 tablet (1,000 mg) by mouth 2 times daily (with meals) 180 tablet 3     losartan-hydrochlorothiazide (HYZAAR) 50-12.5 MG per tablet Take 1 tablet by mouth daily 90 tablet 3     blood glucose monitoring (ONE TOUCH ULTRA) test strip Test 2-3 times daily as directed, brand per insurance formulary. 100 each 0     Blood Glucose Monitoring Suppl (ONE TOUCH ULTRA 2) W/DEVICE KIT Use to test blood sugars 2-3 times daily as directed. Brand per insurance formulary   1 kit 0         ALLERGIES:  Allergies   Allergen Reactions     Ace Inhibitors      cough         PAST MEDICAL HISTORY:  Past Medical History:   Diagnosis Date     Diaphragmatic hernia without mention of obstruction or gangrene      Diverticulitis of colon (without mention of hemorrhage)(562.11)      Esophageal reflux      Irritable bowel syndrome      Macular degeneration (senile) of retina, unspecified      Squamous Cell Carcinoma, Back 1988     Type II or unspecified type diabetes mellitus without mention of complication, not stated as uncontrolled      Unspecified essential hypertension          PAST SURGICAL HISTORY:  Past Surgical History:   Procedure Laterality Date     APPENDECTOMY  1966     BONE MARROW BIOPSY, BONE SPECIMEN, NEEDLE/TROCAR N/A 11/21/2017    Procedure: BIOPSY BONE MARROW;  BONE MARROW BIOPSY;  Surgeon: Shady Garcia MD;  Location:  GI     COLONOSCOPY       ENDOVASCULAR REPAIR ANEURYSM ABDOMINAL  "AORTA N/A 2017    Procedure: ENDOVASCULAR REPAIR ANEURYSM ABDOMINAL AORTA;  ENDOVASCULAR REPAIR ANEURYSM ABDOMINAL AORTA;  Surgeon: Milton Cazares MD;  Location: [facility] OR     SURGICAL HISTORY OF -       Squamous cell skin cancer resection - back     SURGICAL HISTORY OF -       skin tags resection     SURGICAL HISTORY OF -   2001    stress thall. normal     SURGICAL HISTORY OF -   2002    colonoscopy     SURGICAL HISTORY OF -       Fistulotomy with marsupialization for repair of fisture in ano         SOCIAL HISTORY:  Social History     Social History     Marital status:      Spouse name: librado     Number of children: 0     Years of education: 17     Occupational History     retired      Social History Main Topics     Smoking status: Former Smoker     Packs/day: 0.50     Years: 20.00     Quit date: 1975     Smokeless tobacco: Former User     Alcohol use 6.0 - 8.4 oz/week     10 - 14 Standard drinks or equivalent per week      Comment: 2 drinks a week     Drug use: No     Sexual activity: No     Other Topics Concern     Not on file     Social History Narrative   He quit smoking in .  He drinks 1-3 glasses of wine a night with dinner.  He is retired, and used to work as a .  He lives with his wife in [place].  His cousin had lung cancer and  around age 69.  Otherwise, he denies family history of cancer.        FAMILY HISTORY:  Family History   Problem Relation Age of Onset     CEREBROVASCULAR DISEASE Father      x 2     Hypertension Mother      C.A.D. Mother      CANCER Mother      skin     Eye Disorder Mother      glaucoma     Prostate Cancer No family hx of      Cancer - colorectal No family hx of          PHYSICAL EXAM:  Vital signs:  BP (!) 154/96 (BP Location: Left arm, Patient Position: Chair, Cuff Size: Adult Regular)  Pulse 82  Temp 97.9  F (36.6  C)  Resp 22  Ht 1.727 m (5' 8\")  Wt 94.8 kg (209 lb)  SpO2 99%  BMI 31.78 kg/m2   GENERAL/CONSTITUTIONAL: No " acute distress.  EYES: No scleral icterus.  MUSCULOSKELETAL: Warm and well-perfused.  NEUROLOGIC: Alert, oriented, answers questions appropriately.  INTEGUMENTARY: No jaundice.      LABS:  CBC RESULTS:   Recent Labs   Lab Test  03/02/18   1254   WBC  38.2*   RBC  3.28*   HGB  10.9*   HCT  34.5*   MCV  105*   MCH  33.2*   MCHC  31.6   RDW  15.1*   PLT  107*     Recent Labs   Lab Test  03/02/18   1254  01/31/18   1402   NA  139  142   POTASSIUM  4.8  4.4   CHLORIDE  106  107   CO2  28  27   ANIONGAP  5  8   GLC  120*  122*   BUN  27  24   CR  1.22  1.12   MAGDALENO  8.9  9.1     Lab Results   Component Value Date    AST 17 03/02/2018     Lab Results   Component Value Date    ALT 22 03/02/2018     Lab Results   Component Value Date    BILICONJ 0.0 04/29/2005      Lab Results   Component Value Date    BILITOTAL 0.5 03/02/2018     Lab Results   Component Value Date    ALBUMIN 4.2 03/02/2018     Lab Results   Component Value Date    PROTTOTAL 7.6 03/02/2018      Lab Results   Component Value Date    ALKPHOS 63 03/02/2018       Component      Latest Ref Rng & Units 3/2/2018   Lactate Dehydrogenase      85 - 227 U/L 217   Uric Acid      3.5 - 7.2 mg/dL 7.3 (H)         PATHOLOGY:  Bone marrow biopsy 11/21/17:  FINAL DIAGNOSIS:   Peripheral blood demonstrating macrocytic anemia with thrombocytopenia     Bone marrow trephine biopsy and aspirate demonstrating hypercellular   bone marrow involved by chronic lymphocytic leukemia/small lymphocytic   lymphoma, 13% ringed sideroblasts identified (see comment)     COMMENT:   Immunophenotyping studies demonstrate a kappa monotypic CD5 positive   B-cell population most compatible with chronic lymphocytic leukemia.  An   immunoperoxidase stain for cyclin D1 is pending.  Dysplastic changes are   not identified within the erythroid series.  However, the presence of   increased numbers of ringed sideroblasts raises the possibility of an   associated myelodysplastic syndrome, particularly in  light of the noted   increased mean red cell volume and associated thrombocytopenia.  The   thrombocytopenia may also reflect reduced numbers of megakaryocytes   given the involvement of marrow by chronic lymphocytic leukemia.  Many   cases exhibiting ringed sideroblasts are metabolic in origin, without   significant risk of progression to acute leukemia, and these typically   do not show dysplastic morphology as is the case with the current   specimen.  Moreover, 15% ringed sideroblasts are required for a   diagnosis of  RARS. Cytogenetic studies are pending.     Flow  INTERPRETATION:   Bone Marrow, Left:        CD5 positive Kappa monotypic B cells (82%)     COMMENT:   The clonal B-cell population present in this specimen has a similar   immunophenotype as reported previously in the blood from this patient   ([id number]).  The immunophenotypic findings are suggestive of marrow   involvement by small lymphocytic lymphoma/chronic lymphocytic leukemia   (SLL/CLL). Large cells may not survive specimen processing.  There may   be peripheral blood contamination. Final interpretation requires   correlation with results of other ancillary studies, morphologic and   clinical features.     ISCN:   46,XY,del(13)(q12q14)[2]/45,X,-Y[3]/46,XY[15].nuc   alton(ATMx2,TP53x1)[4/200],(C85Q963h3,QQBY2u1)[21/200]     INTERPRETATION:   Two (10%) of the metaphase cells analyzed comprised a clone   characterized by a deletion within the proximal long arm of a chromosome   13 as the sole karyotypic abnormality. Additionally, three (15%)   metaphases had loss of the Y chromosome as the sole abnormality.     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     Loss of the Y chromosome, as seen in this case, has been reported both   as an acquired clonal abnormality associated with hematologic  (primarily   myeloid) disorders and also as a clinically insignificant finding   associated with the normal aging process in adult males. When presenting   as a clonal abnormality, loss of Y is typically seen in addition to   other cytogenetic abnormalities. This patient's age and the absence of   other chromosomal abnormalities suggest that the loss of Y represents an   age-related finding in this case.       IMAGING:  CT c/a/p 11/29/17:  FINDINGS:   Chest: Possible minor fissure lymph node on series 3, image 45 along  the right minor fissure measuring 0.3 cm. Anterior right upper lobe  nodule measures 0.3 cm on image 40. A lateral right lower lobe nodule  measures 0.4 cm on image 47. Bibasilar atelectasis is noted. No  infiltrate or consolidation. No pleural or pericardial fluid. Heart is  normal in size. Esophagus is unremarkable. A few small mediastinal and  hilar lymph nodes are present, but none exceed size criteria for  abnormality. Bilateral axillary lymph nodes are also present. The  largest is in the left axilla measuring 1.7 x 1.1 cm on series 2,  image 18. Coronary artery calcification is present. Aorta demonstrates  scattered calcified plaque without aneurysm.     Abdomen: A large infrarenal abdominal aortic aneurysm is noted  measuring 7.6 x 6.7 cm on series 2, image 80 and contains a moderate  amount of mural thrombus. There is slight haziness around the margin  of the aneurysm sac of uncertain significance as a few scattered lymph  nodes are noted in this area. Periaortic inflammation is possible. No  obvious retroperitoneal hemorrhage at this time. A periaortic node on  series 2, image 76 near the superior aspect of the aneurysm measures  1.9 x 1.4 cm. Aneurysm does not extend down into the common iliac  arteries. Probable calcified gallstone in the gallbladder on series 2,  image 69. Gallbladder is otherwise within normal limits. The liver,  pancreas, adrenal glands and kidneys are unremarkable.  No  hydronephrosis or renal calculi. Spleen is prominent in size without  focal mass measuring 15.7 cm in AP dimension. Tiny cyst posterior  spleen is noted on series 2, image 64. No evidence of bowel  obstruction or diverticulitis.     Pelvis: The bladder and rectum are unremarkable. No enlarged pelvic  lymph nodes or free fluid. Bone window examination demonstrates  degenerative spine changes.         IMPRESSION:  1. Mildly enlarged left axillary lymph node and periaortic lymph nodes  in the retroperitoneum of the abdomen. Spleen is also enlarged.  Findings would be consistent with patient's underlying diagnosis of  CLL. Follow up as clinically warranted.  2. Infrarenal abdominal aortic aneurysm measuring up to 7.6 cm in  diameter. Mild indistinctness of the aneurysm wall could be due to  inflammation. No retroperitoneal hemorrhage is currently evident.  Recommend followup with vascular surgery or interventional radiology  for further assessment.        ASSESSMENT/PLAN:  Austin Garza is a 77 year old male with:    1) CLL/SLL: BLACKWOOD stage III, with lymphocytosis, lymphadenopathy, splenomegaly, and anemia.  He has mild thrombocytopenia as well, but is above 100K.  He presented with leukocytosis with WBC of 61.7K, hemoglobin of 10.4, and platelet count of 115K on diagnosis.  Bone marrow biopsy and flow cytometry confirmed CLL/SLL.  CT scan shows mildly enlarged left axillary lymph node and periaortic lymph nodes in the retroperitoneum of the abdomen, with enlarged spleen.      At this point, his CBC appears stable.  He has elevated WBC, but is not necessarily an indication to start treatment.  If he has increased doubling time of his lymphocyte count and progressive anemia and thrombobocytopenia, then we may consider treatment.  If he has worsening lymphadenopathy that is causing symptoms or organ dysfunction, then we may consider treatment.  He does not have B-symptoms currently.  In addition, with his large  aortic aneurysm and just had an EVAR done, I would not start treatment for CLL at this point anyway.  He will focus on the aneurysm repair and healing from that for now.  I recommended that we watch and wait for now.  Austin agrees with this, and we will continue to monitor his CBC periodically.      -CBC from 3/2/18 reviewed: WBC remains elevated, but trended down some/stable from the previous check.  His hemoglobin has trended up.  Platelet count remains stable and above 100K.  -continue monitor CBC for now  -RTC in 2 months with repeat labs    2) Abdominal aortic aneurysm: measures up to 7.6 cm on CT scan, incidentally found.  He underwent EVAR on 12/4/17.  -follow-up with vascular surgery; he is to follow-up with repeat CTA around July 2018    3) Diabetes, hypertension:  -following with PCP      I spent a total of 15 minutes with the patient, with over >50% of the time in counseling and/or coordination of care.      Breana Perry MD  Hematology/Oncology  [facility]

## 2018-05-15 ENCOUNTER — HOSPITAL ENCOUNTER (OUTPATIENT)
Facility: CLINIC | Age: 78
Setting detail: SPECIMEN
Discharge: HOME | End: 2018-05-15
Attending: INTERNAL MEDICINE | Admitting: INTERNAL MEDICINE
Payer: MEDICARE

## 2018-05-15 ENCOUNTER — TELEPHONE (OUTPATIENT)
Dept: ONCOLOGY | Facility: CLINIC | Age: 78
End: 2018-05-15

## 2018-05-15 ENCOUNTER — ONCOLOGY VISIT (OUTPATIENT)
Dept: ONCOLOGY | Facility: CLINIC | Age: 78
End: 2018-05-15
Attending: INTERNAL MEDICINE
Payer: MEDICARE

## 2018-05-15 ENCOUNTER — HOSPITAL ENCOUNTER (OUTPATIENT)
Dept: LAB | Facility: CLINIC | Age: 78
Discharge: HOME | End: 2018-05-15
Attending: INTERNAL MEDICINE | Admitting: INTERNAL MEDICINE
Payer: MEDICARE

## 2018-05-15 DIAGNOSIS — C91.10 CLL (CHRONIC LYMPHOCYTIC LEUKEMIA) (H): Primary | ICD-10-CM

## 2018-05-15 DIAGNOSIS — C91.10 CLL (CHRONIC LYMPHOCYTIC LEUKEMIA) (H): ICD-10-CM

## 2018-05-15 LAB
ALBUMIN SERPL-MCNC: 4.2 G/DL (ref 3.4–5)
ALP SERPL-CCNC: 52 U/L (ref 40–150)
ALT SERPL W P-5'-P-CCNC: 29 U/L (ref 0–70)
ANION GAP SERPL CALCULATED.3IONS-SCNC: 6 MMOL/L (ref 3–14)
ANISOCYTOSIS BLD QL SMEAR: SLIGHT
AST SERPL W P-5'-P-CCNC: 22 U/L (ref 0–45)
BASOPHILS # BLD AUTO: 0 10E9/L (ref 0–0.2)
BASOPHILS NFR BLD AUTO: 0 %
BILIRUB SERPL-MCNC: 0.5 MG/DL (ref 0.2–1.3)
BUN SERPL-MCNC: 29 MG/DL (ref 7–30)
CALCIUM SERPL-MCNC: 9.1 MG/DL (ref 8.5–10.1)
CHLORIDE SERPL-SCNC: 108 MMOL/L (ref 94–109)
CO2 SERPL-SCNC: 26 MMOL/L (ref 20–32)
COPATH REPORT: NORMAL
CREAT SERPL-MCNC: 1.38 MG/DL (ref 0.66–1.25)
DACRYOCYTES BLD QL SMEAR: SLIGHT
DIFFERENTIAL METHOD BLD: ABNORMAL
EOSINOPHIL # BLD AUTO: 0 10E9/L (ref 0–0.7)
EOSINOPHIL NFR BLD AUTO: 0 %
ERYTHROCYTE [DISTWIDTH] IN BLOOD BY AUTOMATED COUNT: 15.8 % (ref 10–15)
GFR SERPL CREATININE-BSD FRML MDRD: 50 ML/MIN/1.7M2
GLUCOSE SERPL-MCNC: 125 MG/DL (ref 70–99)
HCT VFR BLD AUTO: 32.8 % (ref 40–53)
HGB BLD-MCNC: 10.3 G/DL (ref 13.3–17.7)
LDH SERPL L TO P-CCNC: 237 U/L (ref 85–227)
LYMPHOCYTES # BLD AUTO: 65.5 10E9/L (ref 0.8–5.3)
LYMPHOCYTES NFR BLD AUTO: 97 %
MACROCYTES BLD QL SMEAR: PRESENT
MCH RBC QN AUTO: 33 PG (ref 26.5–33)
MCHC RBC AUTO-ENTMCNC: 31.4 G/DL (ref 31.5–36.5)
MCV RBC AUTO: 105 FL (ref 78–100)
MICROCYTES BLD QL SMEAR: PRESENT
MONOCYTES # BLD AUTO: 0.7 10E9/L (ref 0–1.3)
MONOCYTES NFR BLD AUTO: 1 %
NEUTROPHILS # BLD AUTO: 1.4 10E9/L (ref 1.6–8.3)
NEUTROPHILS NFR BLD AUTO: 2 %
OVALOCYTES BLD QL SMEAR: SLIGHT
PLATELET # BLD AUTO: 119 10E9/L (ref 150–450)
PLATELET # BLD EST: ABNORMAL 10*3/UL
POTASSIUM SERPL-SCNC: 5 MMOL/L (ref 3.4–5.3)
PROT SERPL-MCNC: 7.5 G/DL (ref 6.8–8.8)
RBC # BLD AUTO: 3.12 10E12/L (ref 4.4–5.9)
RETICS # AUTO: 58.7 10E9/L (ref 25–95)
RETICS/RBC NFR AUTO: 1.9 % (ref 0.5–2)
SODIUM SERPL-SCNC: 140 MMOL/L (ref 133–144)
URATE SERPL-MCNC: 7.5 MG/DL (ref 3.5–7.2)
WBC # BLD AUTO: 67.5 10E9/L (ref 4–11)

## 2018-05-15 PROCEDURE — 80053 COMPREHEN METABOLIC PANEL: CPT | Performed by: INTERNAL MEDICINE

## 2018-05-15 PROCEDURE — 83615 LACTATE (LD) (LDH) ENZYME: CPT | Performed by: INTERNAL MEDICINE

## 2018-05-15 PROCEDURE — 85045 AUTOMATED RETICULOCYTE COUNT: CPT | Performed by: INTERNAL MEDICINE

## 2018-05-15 PROCEDURE — 36415 COLL VENOUS BLD VENIPUNCTURE: CPT

## 2018-05-15 PROCEDURE — 84550 ASSAY OF BLOOD/URIC ACID: CPT | Performed by: INTERNAL MEDICINE

## 2018-05-15 PROCEDURE — 85060 BLOOD SMEAR INTERPRETATION: CPT | Performed by: INTERNAL MEDICINE

## 2018-05-15 PROCEDURE — 85025 COMPLETE CBC W/AUTO DIFF WBC: CPT | Performed by: INTERNAL MEDICINE

## 2018-05-15 PROCEDURE — 40000847 ZZHCL STATISTIC MORPHOLOGY W/INTERP HISTOLOGY TC 85060: Performed by: INTERNAL MEDICINE

## 2018-05-15 NOTE — TELEPHONE ENCOUNTER
Please call patient and see if he's feeling okay.  If he has no symptoms, then I can seen him in clinic in follow-up as scheduled this Thursday.  Please also have him repeat a CBC on Thursday.  Can you see if the lab can add on a peripheral smear to today's labs?  I'll place orders.

## 2018-05-15 NOTE — LETTER
5/15/2018         RE: Austin Garza  [address]  [place] [address]        Dear Colleague,    Thank you for referring your patient, Austin Garza, to the [facility]. Please see a copy of my visit note below.    Medical Assistant Note:  Austin Garza presents today for Follow up .    Patient seen by provider today: No.   present during visit today: Not Applicable.    Concerns: No Concerns.    Procedure:  Labs drawn: Yes.    Post Assessment:  Labs drawn without difficulty: Yes.    Discharge Plan:  Patient discharged in stable condition accompanied by: self.    Face to Face Time: 10 mins.    Cherry Rodriguez            Again, thank you for allowing me to participate in the care of your patient.        Sincerely,        [facility] Nurse

## 2018-05-15 NOTE — MR AVS SNAPSHOT
After Visit Summary   5/15/2018    Austin Garza    MRN: [id number]           Patient Information     Date Of Birth          1940        Visit Information        Provider Department      5/15/2018 8:00 AM  ONCOLOGY NURSE [facility]        Today's Diagnoses     CLL (chronic lymphocytic leukemia) (H)           Follow-ups after your visit        Your next 10 appointments already scheduled     May 17, 2018  3:15 PM CDT   Return Visit with Breana Perry MD   [facility] ([facility])    [facility]  [address]  [facility] [address]   [phone]            Jul 17, 2018  9:15 AM CDT   CTA ANGIOGRAM ABDOMEN PELVIS with SHCT1   [facility] ([facility])    [address] [place] [id number] [phone]           Please bring any scans or X-rays taken at other hospitals, if similar tests were done. Also bring a list of your medicines, including vitamins, minerals and over-the-counter drugs. It is safest to leave personal items at home.  Be sure to tell your doctor:   If you have any allergies.   If there s any chance you are pregnant.   If you are breastfeeding.    If you have diabetes as your medication may need to be adjusted for this exam.  You will have contrast for this exam. To prepare:   Do not eat or drink for 2 hours before your exam. If you need to take medicine, you may take it with small sips of water. (We may ask you to take liquid medicine as well.)   The day before your exam, drink extra fluids at least six 8-ounce glasses (unless your doctor tells you to restrict your fluids).  Patients over 70 or patients with diabetes or kidney problems:   If you haven t had a blood test (creatinine test) within the last 30 days, the Cardiologist/Radiologist may require you to get this test prior to your exam.  Please wear loose clothing, such as a  sweat suit or jogging clothes. Avoid snaps, zippers and other metal. We may ask you to undress and put on a hospital gown.  If you have any questions, please call the Imaging Department where you will have your exam.            Jul 17, 2018 10:45 AM CDT   Return Visit with Milton Cazares MD   [facility] ([facility])    [address]  [place] [address] [phone]              Who to contact     If you have questions or need follow up information about today's clinic visit or your schedule please contact [facility] directly at [phone].  Normal or non-critical lab and imaging results will be communicated to you by TapBlazehart, letter or phone within 4 business days after the clinic has received the results. If you do not hear from us within 7 days, please contact the clinic through TapBlazehart or phone. If you have a critical or abnormal lab result, we will notify you by phone as soon as possible.  Submit refill requests through [email or web address] or call your pharmacy and they will forward the refill request to us. Please allow 3 business days for your refill to be completed.          Additional Information About Your Visit        [email or web address] Information     [email or web address] gives you secure access to your electronic health record. If you see a primary care provider, you can also send messages to your care team and make appointments. If you have questions, please call your primary care clinic.  If you do not have a primary care provider, please call [phone] and they will assist you.        Care EveryWhere ID     This is your Care EveryWhere ID. This could be used by other organizations to access your [place] medical records  [id number]         Blood Pressure from Last 3 Encounters:   03/06/18 (!) 154/96   02/01/18 131/89   01/16/18 122/70    Weight from Last 3 Encounters:   03/06/18 94.8 kg (209 lb)   02/01/18 95.9  kg (211 lb 6.4 oz)   01/04/18 95.7 kg (211 lb)              We Performed the Following     CBC with platelets differential     Comprehensive metabolic panel     Lactate Dehydrogenase     Uric acid        Primary Care Provider Office Phone # Fax #    Sandi Eastman [phone] [address] [place] [address]        Equal Access to Services     [facility]: Hadii aad ku hadasho Soomaali, waaxda luqadaha, qaybta kaalmada adeegyada, waxay idiin hayaan adeeg kharash la'aan . So [facility] [phone].    ATENCIÓN: Si habla español, tiene a edward disposición servicios gratuitos de asistencia lingüística. Llame al [phone].    We comply with applicable federal civil rights laws and [place] laws. We do not discriminate on the basis of race, color, national origin, age, disability, sex, sexual orientation, or gender identity.            Thank you!     Thank you for choosing [facility]  for your care. Our goal is always to provide you with excellent care. Hearing back from our patients is one way we can continue to improve our services. Please take a few minutes to complete the written survey that you may receive in the mail after your visit with us. Thank you!             Your Updated Medication List - Protect others around you: Learn how to safely use, store and throw away your medicines at www.disposemymeds.org.          This list is accurate as of 5/15/18  8:26 AM.  Always use your most recent med list.                   Brand Name Dispense Instructions for use Diagnosis    aspirin 325 MG tablet     90 tablet    Take 1 tablet (325 mg) by mouth every evening    Abdominal aortic aneurysm (AAA) without rupture (H)       blood glucose monitoring meter device kit     1 kit    Use to test blood sugars 2-3 times daily as directed. Brand per insurance formulary    Type II or unspecified type diabetes mellitus without mention of complication, not stated as uncontrolled        blood glucose monitoring test strip    ONETOUCH ULTRA    100 each    Test 2-3 times daily as directed, brand per insurance formulary.    Type 2 diabetes mellitus without complication, without long-term current use of insulin (H)       CENTRUM SILVER per tablet      Take 1 tablet by mouth daily        fluticasone 50 MCG/ACT spray    FLONASE    1 Bottle    Spray 2 sprays into both nostrils 2 times daily    Viral URI with cough       glimepiride 1 MG tablet    AMARYL    90 tablet    Take 1 tablet (1 mg) by mouth every morning (before breakfast)    Type 2 diabetes mellitus without complication, without long-term current use of insulin (H)       LIPITOR PO      Take 20 mg by mouth At Bedtime        losartan-hydrochlorothiazide 50-12.5 MG per tablet    HYZAAR    90 tablet    Take 1 tablet by mouth daily    Hypertension goal BP (blood pressure) < 140/90       metFORMIN 1000 MG tablet    GLUCOPHAGE    180 tablet    Take 1 tablet (1,000 mg) by mouth 2 times daily (with meals)    Type 2 diabetes mellitus without complication, without long-term current use of insulin (H)

## 2018-05-15 NOTE — TELEPHONE ENCOUNTER
Talked with pt.  Pt denies any new symptoms or worsening symptoms.  No fever.  States that other than some mild fatigue which he has had for months, he is feeling fine. Pt was notified with his WBC count from today.  Advised pt that Dr Perry would like to recheck pt's CBC at his appt on 5/17.  Pt will come 30 minutes early on 5/17 for labs.  Pt was instructed to notify the clinic immediately with any fever or new or worsening symptoms. Patient verbalized understanding and agreement with plan.  Pt was instructed to call the clinic with any questions, concerns, or worsening symptoms.   Also talked with Gayle at [facility] lab. Gayle states that they should be able to add on peripheral smear and reticulocyte count to today's labs.  She will let us know if they cannot add on these labs.   Will route update to Dr Perry.  Alysa Campos RN BSN

## 2018-05-15 NOTE — PROGRESS NOTES
Medical Assistant Note:  Austin Garza presents today for Follow up .    Patient seen by provider today: No.   present during visit today: Not Applicable.    Concerns: No Concerns.    Procedure:  Labs drawn: Yes.    Post Assessment:  Labs drawn without difficulty: Yes.    Discharge Plan:  Patient discharged in stable condition accompanied by: self.    Face to Face Time: 10 mins.    Cherry Rodriguez

## 2018-05-15 NOTE — TELEPHONE ENCOUNTER
Blossom from [facility] lab calling with critical lab value:     WBC=67.5    Will route to Dr Perry for advice.  Alysa Campos RN BSN

## 2018-05-17 ENCOUNTER — HOSPITAL ENCOUNTER (OUTPATIENT)
Facility: CLINIC | Age: 78
Setting detail: SPECIMEN
Discharge: HOME | End: 2018-05-17
Attending: INTERNAL MEDICINE | Admitting: INTERNAL MEDICINE
Payer: MEDICARE

## 2018-05-17 ENCOUNTER — ONCOLOGY VISIT (OUTPATIENT)
Dept: ONCOLOGY | Facility: CLINIC | Age: 78
End: 2018-05-17
Attending: INTERNAL MEDICINE
Payer: MEDICARE

## 2018-05-17 ENCOUNTER — TELEPHONE (OUTPATIENT)
Dept: ONCOLOGY | Facility: CLINIC | Age: 78
End: 2018-05-17

## 2018-05-17 VITALS
RESPIRATION RATE: 16 BRPM | DIASTOLIC BLOOD PRESSURE: 82 MMHG | WEIGHT: 214 LBS | HEIGHT: 68 IN | HEART RATE: 86 BPM | TEMPERATURE: 97.8 F | SYSTOLIC BLOOD PRESSURE: 130 MMHG | BODY MASS INDEX: 32.43 KG/M2 | OXYGEN SATURATION: 98 %

## 2018-05-17 DIAGNOSIS — C91.10 CLL (CHRONIC LYMPHOCYTIC LEUKEMIA) (H): Primary | ICD-10-CM

## 2018-05-17 LAB
ANISOCYTOSIS BLD QL SMEAR: SLIGHT
BASOPHILS # BLD AUTO: 0 10E9/L (ref 0–0.2)
BASOPHILS NFR BLD AUTO: 0 %
DACRYOCYTES BLD QL SMEAR: SLIGHT
DIFFERENTIAL METHOD BLD: ABNORMAL
EOSINOPHIL # BLD AUTO: 0 10E9/L (ref 0–0.7)
EOSINOPHIL NFR BLD AUTO: 0 %
ERYTHROCYTE [DISTWIDTH] IN BLOOD BY AUTOMATED COUNT: 15.7 % (ref 10–15)
HCT VFR BLD AUTO: 31.9 % (ref 40–53)
HGB BLD-MCNC: 10.3 G/DL (ref 13.3–17.7)
LYMPHOCYTES # BLD AUTO: 63.2 10E9/L (ref 0.8–5.3)
LYMPHOCYTES NFR BLD AUTO: 91 %
MACROCYTES BLD QL SMEAR: PRESENT
MCH RBC QN AUTO: 34.3 PG (ref 26.5–33)
MCHC RBC AUTO-ENTMCNC: 32.3 G/DL (ref 31.5–36.5)
MCV RBC AUTO: 106 FL (ref 78–100)
MICROCYTES BLD QL SMEAR: PRESENT
MONOCYTES # BLD AUTO: 0 10E9/L (ref 0–1.3)
MONOCYTES NFR BLD AUTO: 0 %
NEUTROPHILS # BLD AUTO: 6.3 10E9/L (ref 1.6–8.3)
NEUTROPHILS NFR BLD AUTO: 9 %
OVALOCYTES BLD QL SMEAR: SLIGHT
PLATELET # BLD AUTO: 82 10E9/L (ref 150–450)
PLATELET # BLD EST: ABNORMAL 10*3/UL
RBC # BLD AUTO: 3 10E12/L (ref 4.4–5.9)
WBC # BLD AUTO: 69.5 10E9/L (ref 4–11)

## 2018-05-17 PROCEDURE — 85025 COMPLETE CBC W/AUTO DIFF WBC: CPT | Performed by: INTERNAL MEDICINE

## 2018-05-17 PROCEDURE — 99214 OFFICE O/P EST MOD 30 MIN: CPT | Performed by: INTERNAL MEDICINE

## 2018-05-17 PROCEDURE — G0463 HOSPITAL OUTPT CLINIC VISIT: HCPCS

## 2018-05-17 PROCEDURE — 36415 COLL VENOUS BLD VENIPUNCTURE: CPT

## 2018-05-17 ASSESSMENT — PAIN SCALES - GENERAL: PAINLEVEL: NO PAIN (0)

## 2018-05-17 NOTE — TELEPHONE ENCOUNTER
Lab called with critical lab value of Total WBC of 69.5 and ANC of 4.8. .  Writer gave info to MD, .  MD is aware lab is rerunning test, but do not expect any sig change in result.  Hollis Lucas RN, BSN, OCN  [facility]  Patient Care Coordinator'

## 2018-05-17 NOTE — LETTER
5/17/2018         RE: Austin Garza  [address]  [place] [address]        Dear Colleague,    Thank you for referring your patient, Austin Garza, to the [facility]. Please see a copy of my visit note below.    [facility]    Hematology/Oncology Established Patient Note      Today's Date: 5/17/18    Reason for Follow-up: CLL      HISTORY OF PRESENT ILLNESS: Austin Garza is a 77 year old male with PMHx of DMII, HTN who presented with leukocytosis.  In November 2017, he was found to have elevated WBC of 61.7K, hemoglobin of 10.4, and platelet of 115K.  There were no other CBC results available between 2010 and 2017.  Prior to 2010, he had normal CBC, with normal to mild anemia, and mild thrombocytopenia.   He was asymptomatic.    He underwent bone marrow biopsy on 11/21/17, which was consistent with chronic lymphocytic leukemia/small lymphocytic lymphoma, with 13% ringed sideroblasts identified.  The presence of increased numbers of ringed sideroblasts raises the possibility of an associated myelodysplastic syndrome.  Dysplastic changes are not identified though.  Many cases exhibiting ringed sideroblasts are metabolic origin, without significant risk of progression to acute leukemia, and these typically do not show dysplastic morphology as is the case with this specimen.  15% ringed sideroblasts are required for a diagnosis for RARS, which this specimen does not meet.  Flow cytometry is also consistent with CLL/SLL.  Cytogenetics show two (10%) of the metaphase cells comprise a clone characterized by a deletion within the proximal long arm of a chromosome 13 as the sole karyotypic abnormality.  Three (15%) metaphases had loss of the Y chromosome as the sole abnormality.    CT scan on 11/29/17 shows mildly enlarged left axillary lymph node and periaortic lymph nodes in the retroperitoneum of the abdomen.  Spleen is also enlarged.    On his  staging CT scan for CLL, he was incidentally found to have a large infrarenal abdominal aortic aneurysm, measuring 7.6 cm.  He was seen by Dr. Thomas, and he underwent EVAR on 12/4/17.       INTERIM HISTORY: Austin is here for follow-up today. He says that he is feeling okay.  He mainly feels fatigued.  He denies fevers or recent infections.  He notes night sweats around his neck, but he's not soaking the sheets.  He denies any new lumps/bumps.        REVIEW OF SYSTEMS:   14 point ROS was reviewed and is negative other than as noted above in HPI.       HOME MEDICATIONS:  Current Outpatient Prescriptions   Medication Sig Dispense Refill     aspirin 325 MG tablet Take 1 tablet (325 mg) by mouth every evening 90 tablet 3     Atorvastatin Calcium (LIPITOR PO) Take 20 mg by mouth At Bedtime       blood glucose monitoring (ONE TOUCH ULTRA) test strip Test 2-3 times daily as directed, brand per insurance formulary. 100 each 0     Blood Glucose Monitoring Suppl (ONE TOUCH ULTRA 2) W/DEVICE KIT Use to test blood sugars 2-3 times daily as directed. Brand per insurance formulary   1 kit 0     fluticasone (FLONASE) 50 MCG/ACT spray Spray 2 sprays into both nostrils 2 times daily 1 Bottle 11     glimepiride (AMARYL) 1 MG tablet Take 1 tablet (1 mg) by mouth every morning (before breakfast) 90 tablet 3     losartan-hydrochlorothiazide (HYZAAR) 50-12.5 MG per tablet Take 1 tablet by mouth daily 90 tablet 3     metFORMIN (GLUCOPHAGE) 1000 MG tablet Take 1 tablet (1,000 mg) by mouth 2 times daily (with meals) 180 tablet 3     Multiple Vitamins-Minerals (CENTRUM SILVER) per tablet Take 1 tablet by mouth daily           ALLERGIES:  Allergies   Allergen Reactions     Ace Inhibitors      cough         PAST MEDICAL HISTORY:  Past Medical History:   Diagnosis Date     Diaphragmatic hernia without mention of obstruction or gangrene      Diverticulitis of colon (without mention of hemorrhage)(562.11)      Esophageal reflux      Irritable  bowel syndrome      Macular degeneration (senile) of retina, unspecified      Squamous Cell Carcinoma, Back      Type II or unspecified type diabetes mellitus without mention of complication, not stated as uncontrolled      Unspecified essential hypertension          PAST SURGICAL HISTORY:  Past Surgical History:   Procedure Laterality Date     APPENDECTOMY       BONE MARROW BIOPSY, BONE SPECIMEN, NEEDLE/TROCAR N/A 2017    Procedure: BIOPSY BONE MARROW;  BONE MARROW BIOPSY;  Surgeon: Shayd Garcia MD;  Location:  GI     COLONOSCOPY       ENDOVASCULAR REPAIR ANEURYSM ABDOMINAL AORTA N/A 2017    Procedure: ENDOVASCULAR REPAIR ANEURYSM ABDOMINAL AORTA;  ENDOVASCULAR REPAIR ANEURYSM ABDOMINAL AORTA;  Surgeon: Milton Cazares MD;  Location:  OR     SURGICAL HISTORY OF -       Squamous cell skin cancer resection - back     SURGICAL HISTORY OF -       skin tags resection     SURGICAL HISTORY OF -   2001    stress thall. normal     SURGICAL HISTORY OF -   2002    colonoscopy     SURGICAL HISTORY OF -       Fistulotomy with marsupialization for repair of fisture in ano         SOCIAL HISTORY:  Social History     Social History     Marital status:      Spouse name: librado     Number of children: 0     Years of education: 17     Occupational History     retired      Social History Main Topics     Smoking status: Former Smoker     Packs/day: 0.50     Years: 20.00     Quit date: 1975     Smokeless tobacco: Former User     Alcohol use 6.0 - 8.4 oz/week     10 - 14 Standard drinks or equivalent per week      Comment: 2 drinks a week     Drug use: No     Sexual activity: No     Other Topics Concern     Not on file     Social History Narrative   He quit smoking in .  He drinks 1-3 glasses of wine a night with dinner.  He is retired, and used to work as a .  He lives with his wife in [place].  His cousin had lung cancer and  around age 69.  Otherwise, he  "denies family history of cancer.        FAMILY HISTORY:  Family History   Problem Relation Age of Onset     CEREBROVASCULAR DISEASE Father      x 2     Hypertension Mother      C.A.D. Mother      CANCER Mother      skin     Eye Disorder Mother      glaucoma     Prostate Cancer No family hx of      Cancer - colorectal No family hx of          PHYSICAL EXAM:  Vital signs:  /82  Pulse 86  Temp 97.8  F (36.6  C) (Tympanic)  Resp 16  Ht 1.727 m (5' 8\")  Wt 97.1 kg (214 lb)  SpO2 98%  BMI 32.54 kg/m2   GENERAL/CONSTITUTIONAL: No acute distress.  EYES: No scleral icterus.  RESPIRATORY: Clear to auscultation bilaterally.  CARDIOVASCULAR: Regular rate and rhythm.  GASTROINTESTINAL: Bowel sounds present, non-tender to palpation.  MUSCULOSKELETAL: Warm and well-perfused.  NEUROLOGIC: Alert, oriented, answers questions appropriately.  INTEGUMENTARY: No jaundice.      LABS:  CBC RESULTS:   Recent Labs   Lab Test  05/17/18   1500   WBC  69.5*   RBC  3.00*   HGB  10.3*   HCT  31.9*   MCV  106*   MCH  34.3*   MCHC  32.3   RDW  15.7*   PLT  82*     Recent Labs   Lab Test  05/15/18   0820  03/02/18   1254   NA  140  139   POTASSIUM  5.0  4.8   CHLORIDE  108  106   CO2  26  28   ANIONGAP  6  5   GLC  125*  120*   BUN  29  27   CR  1.38*  1.22   MAGDALENO  9.1  8.9     Lab Results   Component Value Date    AST 22 05/15/2018     Lab Results   Component Value Date    ALT 29 05/15/2018     Lab Results   Component Value Date    BILICONJ 0.0 04/29/2005      Lab Results   Component Value Date    BILITOTAL 0.5 05/15/2018     Lab Results   Component Value Date    ALBUMIN 4.2 05/15/2018     Lab Results   Component Value Date    PROTTOTAL 7.5 05/15/2018      Lab Results   Component Value Date    ALKPHOS 52 05/15/2018         Component      Latest Ref Rng & Units 5/15/2018   Lactate Dehydrogenase      85 - 227 U/L 237 (H)   Uric Acid      3.5 - 7.2 mg/dL 7.5 (H)       Peripheral smear 5/15/18:    FINAL DIAGNOSIS:   Peripheral blood.   - " Leukocytosis with absolute lymphopenia consistent with history of CLL.     Neutropenia.  Anemia, macrocytic.   Thrombocytopenia.         PATHOLOGY:  Bone marrow biopsy 11/21/17:  FINAL DIAGNOSIS:   Peripheral blood demonstrating macrocytic anemia with thrombocytopenia     Bone marrow trephine biopsy and aspirate demonstrating hypercellular   bone marrow involved by chronic lymphocytic leukemia/small lymphocytic   lymphoma, 13% ringed sideroblasts identified (see comment)     COMMENT:   Immunophenotyping studies demonstrate a kappa monotypic CD5 positive   B-cell population most compatible with chronic lymphocytic leukemia.  An   immunoperoxidase stain for cyclin D1 is pending.  Dysplastic changes are   not identified within the erythroid series.  However, the presence of   increased numbers of ringed sideroblasts raises the possibility of an   associated myelodysplastic syndrome, particularly in light of the noted   increased mean red cell volume and associated thrombocytopenia.  The   thrombocytopenia may also reflect reduced numbers of megakaryocytes   given the involvement of marrow by chronic lymphocytic leukemia.  Many   cases exhibiting ringed sideroblasts are metabolic in origin, without   significant risk of progression to acute leukemia, and these typically   do not show dysplastic morphology as is the case with the current   specimen.  Moreover, 15% ringed sideroblasts are required for a   diagnosis of  RARS. Cytogenetic studies are pending.     Flow  INTERPRETATION:   Bone Marrow, Left:        CD5 positive Kappa monotypic B cells (82%)     COMMENT:   The clonal B-cell population present in this specimen has a similar   immunophenotype as reported previously in the blood from this patient   ([id number]).  The immunophenotypic findings are suggestive of marrow   involvement by small lymphocytic lymphoma/chronic lymphocytic leukemia   (SLL/CLL). Large cells may not survive specimen processing.  There may   be  peripheral blood contamination. Final interpretation requires   correlation with results of other ancillary studies, morphologic and   clinical features.     ISCN:   46,XY,del(13)(q12q14)[2]/45,X,-Y[3]/46,XY[15].nuc   alton(ATMx2,TP53x1)[4/200],(W72O942s1,GOGH6m0)[21/200]     INTERPRETATION:   Two (10%) of the metaphase cells analyzed comprised a clone   characterized by a deletion within the proximal long arm of a chromosome   13 as the sole karyotypic abnormality. Additionally, three (15%)   metaphases had loss of the Y chromosome as the sole abnormality.     These findings confirm and expand those of the previously reported FISH   analysis ([id number]) that showed 10.5% of interphase cells to have a   signal pattern indicative of loss of the [id number] locus.     Loss of the Y chromosome, as seen in this case, has been reported both   as an acquired clonal abnormality associated with hematologic (primarily   myeloid) disorders and also as a clinically insignificant finding   associated with the normal aging process in adult males. When presenting   as a clonal abnormality, loss of Y is typically seen in addition to   other cytogenetic abnormalities. This patient's age and the absence of   other chromosomal abnormalities suggest that the loss of Y represents an   age-related finding in this case.       IMAGING:  CT c/a/p 11/29/17:  FINDINGS:   Chest: Possible minor fissure lymph node on series 3, image 45 along  the right minor fissure measuring 0.3 cm. Anterior right upper lobe  nodule measures 0.3 cm on image 40. A lateral right lower lobe nodule  measures 0.4 cm on image 47. Bibasilar atelectasis is noted. No  infiltrate or consolidation. No pleural or pericardial fluid. Heart is  normal in size. Esophagus is unremarkable. A few small mediastinal and  hilar lymph nodes are present, but none exceed size criteria for  abnormality. Bilateral axillary lymph nodes are also present. The  largest is in the left axilla  measuring 1.7 x 1.1 cm on series 2,  image 18. Coronary artery calcification is present. Aorta demonstrates  scattered calcified plaque without aneurysm.     Abdomen: A large infrarenal abdominal aortic aneurysm is noted  measuring 7.6 x 6.7 cm on series 2, image 80 and contains a moderate  amount of mural thrombus. There is slight haziness around the margin  of the aneurysm sac of uncertain significance as a few scattered lymph  nodes are noted in this area. Periaortic inflammation is possible. No  obvious retroperitoneal hemorrhage at this time. A periaortic node on  series 2, image 76 near the superior aspect of the aneurysm measures  1.9 x 1.4 cm. Aneurysm does not extend down into the common iliac  arteries. Probable calcified gallstone in the gallbladder on series 2,  image 69. Gallbladder is otherwise within normal limits. The liver,  pancreas, adrenal glands and kidneys are unremarkable. No  hydronephrosis or renal calculi. Spleen is prominent in size without  focal mass measuring 15.7 cm in AP dimension. Tiny cyst posterior  spleen is noted on series 2, image 64. No evidence of bowel  obstruction or diverticulitis.     Pelvis: The bladder and rectum are unremarkable. No enlarged pelvic  lymph nodes or free fluid. Bone window examination demonstrates  degenerative spine changes.         IMPRESSION:  1. Mildly enlarged left axillary lymph node and periaortic lymph nodes  in the retroperitoneum of the abdomen. Spleen is also enlarged.  Findings would be consistent with patient's underlying diagnosis of  CLL. Follow up as clinically warranted.  2. Infrarenal abdominal aortic aneurysm measuring up to 7.6 cm in  diameter. Mild indistinctness of the aneurysm wall could be due to  inflammation. No retroperitoneal hemorrhage is currently evident.  Recommend followup with vascular surgery or interventional radiology  for further assessment.    CT angiogram 1/16/18:  1. New endograft. Endoleak identified. This most  likely represents a  type II endoleak. Aneurysm sac size is not significantly changed when  compared to the previous exam.  2. Mild splenomegaly.  3. Colonic diverticulosis.      ASSESSMENT/PLAN:  Austin Garza is a 77 year old male with:    1) CLL/SLL: BLACKWOOD stage III, with lymphocytosis, lymphadenopathy, splenomegaly, and anemia.  He has mild thrombocytopenia as well, but is above 100K.  He presented with leukocytosis with WBC of 61.7K, hemoglobin of 10.4, and platelet count of 115K on diagnosis.  Bone marrow biopsy and flow cytometry confirmed CLL/SLL.  CT scan shows mildly enlarged left axillary lymph node and periaortic lymph nodes in the retroperitoneum of the abdomen, with enlarged spleen.      His WBC lex to 67.5K this week.  It lex quite a bit from 2 months ago when it was 38.2K, but actually it was around the 60K's when he was initially diagnosed. Hemoglobin remains low, but stable.  His platelet count decreased to 82K today.  I discussed these changes with Austin.  He has symptoms of fatigue and sweats around his neck at night.  Elevated lymphocyte count is not necessarily an indication to start treatment.  However, I discussed that if these symptoms are thought to be related to the CLL and if his lymphocyte count continues to rise rapidly or if his cytopenias worsen, this may warrant starting treatment.  He says that he would be okay with that if it is needed.  I'd like to see a trend in his counts.  We will have him do CBC/diff weekly for the next few weeks.  I will see him back in 4 weeks to see the trend and how his symptoms are.  He may be a candidate for ibrutinib.    2) Abdominal aortic aneurysm: measures up to 7.6 cm on CT scan, incidentally found.  He underwent EVAR on 12/4/17.  -follow-up with vascular surgery; he is to follow-up with repeat CTA around July 2018    3) Diabetes, hypertension:  -following with PCP      I spent a total of 25 minutes with the patient, with over >50% of the time  in counseling and/or coordination of care.      Breana Perry MD  Hematology/Oncology  [facility]      Again, thank you for allowing me to participate in the care of your patient.        Sincerely,        Breana Perry MD

## 2018-05-17 NOTE — MR AVS SNAPSHOT
After Visit Summary   5/17/2018    Austin Garza    MRN: [id number]           Patient Information     Date Of Birth          1940        Visit Information        Provider Department      5/17/2018 3:15 PM Breana Perry MD [facility]        Today's Diagnoses     CLL (chronic lymphocytic leukemia) (H)    -  1      Care Instructions      -schedule weekly lab for the next 4 weeks (okay to do in [place]) - 1st lab scheduled at [place] on 5/24/18  JV    -return to clinic with Dr. Perry in 4 weeks - Scheduled on 6/14/18  JV     AVS given to patient 5/17/18  JV          Follow-ups after your visit        Your next 10 appointments already scheduled     May 24, 2018  8:00 AM CDT   LAB with [facility]   [facility]    [address] [place] [address]  [address]  [facility] [address]   [phone]           Please do not eat 10-12 hours before your appointment if you are coming in fasting for labs on lipids, cholesterol, or glucose (sugar). This does not apply to pregnant women. Water, hot tea and black coffee (with nothing added) are okay. Do not drink other fluids, diet soda or chew gum.            Jun 14, 2018  1:45 PM CDT   Return Visit with Breana Perry MD   [facility] ([facility])    [facility]  [address] [place] [address]   [phone]            Jul 17, 2018  9:15 AM CDT   CTA ANGIOGRAM ABDOMEN PELVIS with SHCT1   [facility] ([facility])    [address] [place] [address]   [phone]           Please bring any scans or X-rays taken at other hospitals, if similar tests were done. Also bring a list of your medicines, including vitamins, minerals and over-the-counter drugs. It is safest to leave personal items at home.  Be sure to tell your doctor:   If you have any allergies.   If there s any  chance you are pregnant.   If you are breastfeeding.    If you have diabetes as your medication may need to be adjusted for this exam.  You will have contrast for this exam. To prepare:   Do not eat or drink for 2 hours before your exam. If you need to take medicine, you may take it with small sips of water. (We may ask you to take liquid medicine as well.)   The day before your exam, drink extra fluids at least six 8-ounce glasses (unless your doctor tells you to restrict your fluids).  Patients over 70 or patients with diabetes or kidney problems:   If you haven t had a blood test (creatinine test) within the last 30 days, the Cardiologist/Radiologist may require you to get this test prior to your exam.  Please wear loose clothing, such as a sweat suit or jogging clothes. Avoid snaps, zippers and other metal. We may ask you to undress and put on a hospital gown.  If you have any questions, please call the Imaging Department where you will have your exam.            Jul 17, 2018 10:45 AM CDT   Return Visit with Milton Cazares MD   [facility] ([facility])    [address]  [facility] [address] [phone]              Future tests that were ordered for you today     Open Standing Orders        Priority Remaining Interval Expires Ordered    CBC with platelets differential Routine 50/50  5/17/2019 5/17/2018          Open Future Orders        Priority Expected Expires Ordered    CBC with platelets differential Routine 6/14/2018 5/17/2019 5/17/2018    Comprehensive metabolic panel Routine 6/14/2018 5/17/2019 5/17/2018    Lactate Dehydrogenase Routine 6/14/2018 5/17/2019 5/17/2018    Uric acid Routine 6/14/2018 5/17/2019 5/17/2018            Who to contact     If you have questions or need follow up information about today's clinic visit or your schedule please contact [facility] directly at  "[phone].  Normal or non-critical lab and imaging results will be communicated to you by MyChart, letter or phone within 4 business days after the clinic has received the results. If you do not hear from us within 7 days, please contact the clinic through [employer] or phone. If you have a critical or abnormal lab result, we will notify you by phone as soon as possible.  Submit refill requests through [employer] or call your pharmacy and they will forward the refill request to us. Please allow 3 business days for your refill to be completed.          Additional Information About Your Visit        [employer] Information     [employer] gives you secure access to your electronic health record. If you see a primary care provider, you can also send messages to your care team and make appointments. If you have questions, please call your primary care clinic.  If you do not have a primary care provider, please call [phone] and they will assist you.        Care EveryWhere ID     This is your Care EveryWhere ID. This could be used by other organizations to access your [facility] medical records  [id number]        Your Vitals Were     Pulse Temperature Respirations Height Pulse Oximetry BMI (Body Mass Index)    86 97.8  F (36.6  C) (Tympanic) 16 1.727 m (5' 8\") 98% 32.54 kg/m2       Blood Pressure from Last 3 Encounters:   05/17/18 130/82   03/06/18 (!) 154/96   02/01/18 131/89    Weight from Last 3 Encounters:   05/17/18 97.1 kg (214 lb)   03/06/18 94.8 kg (209 lb)   02/01/18 95.9 kg (211 lb 6.4 oz)              We Performed the Following     CBC with platelets differential        Primary Care Provider Office Phone # Fax #    Sandi Eastman [phone] [address] [place] [address]        Equal Access to Services     [facility] DAWSON : Robert Valencia, wajose torres, qaybta kaaldanielle rodriguez, mina early. So [facility] [phone].    ATENCIÓN: Si " trena burton, tiene a edward disposición servicios gratuitos de asistencia lingüística. Anika ahumada [phone].    We comply with applicable federal civil rights laws and [place] laws. We do not discriminate on the basis of race, color, national origin, age, disability, sex, sexual orientation, or gender identity.            Thank you!     Thank you for choosing [facility]  for your care. Our goal is always to provide you with excellent care. Hearing back from our patients is one way we can continue to improve our services. Please take a few minutes to complete the written survey that you may receive in the mail after your visit with us. Thank you!             Your Updated Medication List - Protect others around you: Learn how to safely use, store and throw away your medicines at www.disposemymeds.org.          This list is accurate as of 5/17/18  3:54 PM.  Always use your most recent med list.                   Brand Name Dispense Instructions for use Diagnosis    aspirin 325 MG tablet     90 tablet    Take 1 tablet (325 mg) by mouth every evening    Abdominal aortic aneurysm (AAA) without rupture (H)       blood glucose monitoring meter device kit     1 kit    Use to test blood sugars 2-3 times daily as directed. Brand per insurance formulary    Type II or unspecified type diabetes mellitus without mention of complication, not stated as uncontrolled       blood glucose monitoring test strip    ONETOUCH ULTRA    100 each    Test 2-3 times daily as directed, brand per insurance formulary.    Type 2 diabetes mellitus without complication, without long-term current use of insulin (H)       CENTRUM SILVER per tablet      Take 1 tablet by mouth daily        fluticasone 50 MCG/ACT spray    FLONASE    1 Bottle    Spray 2 sprays into both nostrils 2 times daily    Viral URI with cough       glimepiride 1 MG tablet    AMARYL    90 tablet    Take 1 tablet (1 mg) by mouth every morning (before  breakfast)    Type 2 diabetes mellitus without complication, without long-term current use of insulin (H)       LIPITOR PO      Take 20 mg by mouth At Bedtime        losartan-hydrochlorothiazide 50-12.5 MG per tablet    HYZAAR    90 tablet    Take 1 tablet by mouth daily    Hypertension goal BP (blood pressure) < 140/90       metFORMIN 1000 MG tablet    GLUCOPHAGE    180 tablet    Take 1 tablet (1,000 mg) by mouth 2 times daily (with meals)    Type 2 diabetes mellitus without complication, without long-term current use of insulin (H)

## 2018-05-17 NOTE — PATIENT INSTRUCTIONS
-schedule weekly lab for the next 4 weeks (okay to do in [place]) - 1st lab scheduled at [place] on 5/24/18  JV  *Pt will call [place] directly to schedule the rest of the weekly labs    -return to clinic with Dr. Perry in 4 weeks - Scheduled on 6/14/18  JV     AVS given to patient 5/17/18  JV

## 2018-05-17 NOTE — NURSING NOTE
"Oncology Rooming Note    May 17, 2018 3:08 PM   Austin Garza is a 77 year old male who presents for:    Chief Complaint   Patient presents with     Oncology Clinic Visit     CLL (chronic lymphocytic leukemia-Follow up     Initial Vitals: /82  Pulse 86  Temp 97.8  F (36.6  C) (Tympanic)  Resp 16  Ht 1.727 m (5' 8\")  Wt 97.1 kg (214 lb)  SpO2 98%  BMI 32.54 kg/m2 Estimated body mass index is 32.54 kg/(m^2) as calculated from the following:    Height as of this encounter: 1.727 m (5' 8\").    Weight as of this encounter: 97.1 kg (214 lb). Body surface area is 2.16 meters squared.  No Pain (0) Comment: Data Unavailable   No LMP for male patient.  Allergies reviewed: Yes  Medications reviewed: Yes    Medications: Medication refills not needed today.  Pharmacy name entered into [employer]:    [facility] - [place], [place] - [address] [facility] [address] [place] [address] [address] [facility]    Clinical concerns: Follow-up     8 minutes for nursing intake (face to face time)     DISCHARGE PLAN:  Next appointments: See patient instruction section  Departure Mode: Ambulatory  Accompanied by: self  0 minutes for nursing discharge (face to face time)   Clarisa Martinez                "

## 2018-05-17 NOTE — PROGRESS NOTES
[facility]    Hematology/Oncology Established Patient Note      Today's Date: 5/17/18    Reason for Follow-up: CLL      HISTORY OF PRESENT ILLNESS: Austin Garza is a 77 year old male with PMHx of DMII, HTN who presented with leukocytosis.  In November 2017, he was found to have elevated WBC of 61.7K, hemoglobin of 10.4, and platelet of 115K.  There were no other CBC results available between 2010 and 2017.  Prior to 2010, he had normal CBC, with normal to mild anemia, and mild thrombocytopenia.   He was asymptomatic.    He underwent bone marrow biopsy on 11/21/17, which was consistent with chronic lymphocytic leukemia/small lymphocytic lymphoma, with 13% ringed sideroblasts identified.  The presence of increased numbers of ringed sideroblasts raises the possibility of an associated myelodysplastic syndrome.  Dysplastic changes are not identified though.  Many cases exhibiting ringed sideroblasts are metabolic origin, without significant risk of progression to acute leukemia, and these typically do not show dysplastic morphology as is the case with this specimen.  15% ringed sideroblasts are required for a diagnosis for RARS, which this specimen does not meet.  Flow cytometry is also consistent with CLL/SLL.  Cytogenetics show two (10%) of the metaphase cells comprise a clone characterized by a deletion within the proximal long arm of a chromosome 13 as the sole karyotypic abnormality.  Three (15%) metaphases had loss of the Y chromosome as the sole abnormality.    CT scan on 11/29/17 shows mildly enlarged left axillary lymph node and periaortic lymph nodes in the retroperitoneum of the abdomen.  Spleen is also enlarged.    On his staging CT scan for CLL, he was incidentally found to have a large infrarenal abdominal aortic aneurysm, measuring 7.6 cm.  He was seen by Dr. Thomas, and he underwent EVAR on 12/4/17.       INTERIM HISTORY: Austin is here for follow-up today. He says that he is  feeling okay.  He mainly feels fatigued.  He denies fevers or recent infections.  He notes night sweats around his neck, but he's not soaking the sheets.  He denies any new lumps/bumps.        REVIEW OF SYSTEMS:   14 point ROS was reviewed and is negative other than as noted above in HPI.       HOME MEDICATIONS:  Current Outpatient Prescriptions   Medication Sig Dispense Refill     aspirin 325 MG tablet Take 1 tablet (325 mg) by mouth every evening 90 tablet 3     Atorvastatin Calcium (LIPITOR PO) Take 20 mg by mouth At Bedtime       blood glucose monitoring (ONE TOUCH ULTRA) test strip Test 2-3 times daily as directed, brand per insurance formulary. 100 each 0     Blood Glucose Monitoring Suppl (ONE TOUCH ULTRA 2) W/DEVICE KIT Use to test blood sugars 2-3 times daily as directed. Brand per insurance formulary   1 kit 0     fluticasone (FLONASE) 50 MCG/ACT spray Spray 2 sprays into both nostrils 2 times daily 1 Bottle 11     glimepiride (AMARYL) 1 MG tablet Take 1 tablet (1 mg) by mouth every morning (before breakfast) 90 tablet 3     losartan-hydrochlorothiazide (HYZAAR) 50-12.5 MG per tablet Take 1 tablet by mouth daily 90 tablet 3     metFORMIN (GLUCOPHAGE) 1000 MG tablet Take 1 tablet (1,000 mg) by mouth 2 times daily (with meals) 180 tablet 3     Multiple Vitamins-Minerals (CENTRUM SILVER) per tablet Take 1 tablet by mouth daily           ALLERGIES:  Allergies   Allergen Reactions     Ace Inhibitors      cough         PAST MEDICAL HISTORY:  Past Medical History:   Diagnosis Date     Diaphragmatic hernia without mention of obstruction or gangrene      Diverticulitis of colon (without mention of hemorrhage)(562.11)      Esophageal reflux      Irritable bowel syndrome      Macular degeneration (senile) of retina, unspecified      Squamous Cell Carcinoma, Back 1988     Type II or unspecified type diabetes mellitus without mention of complication, not stated as uncontrolled      Unspecified essential hypertension           PAST SURGICAL HISTORY:  Past Surgical History:   Procedure Laterality Date     APPENDECTOMY  1966     BONE MARROW BIOPSY, BONE SPECIMEN, NEEDLE/TROCAR N/A 2017    Procedure: BIOPSY BONE MARROW;  BONE MARROW BIOPSY;  Surgeon: Shady Garcia MD;  Location:  GI     COLONOSCOPY       ENDOVASCULAR REPAIR ANEURYSM ABDOMINAL AORTA N/A 2017    Procedure: ENDOVASCULAR REPAIR ANEURYSM ABDOMINAL AORTA;  ENDOVASCULAR REPAIR ANEURYSM ABDOMINAL AORTA;  Surgeon: Milton Cazares MD;  Location:  OR     SURGICAL HISTORY OF -       Squamous cell skin cancer resection - back     SURGICAL HISTORY OF -       skin tags resection     SURGICAL HISTORY OF -   2001    stress thall. normal     SURGICAL HISTORY OF -   2002    colonoscopy     SURGICAL HISTORY OF -       Fistulotomy with marsupialization for repair of fisture in ano         SOCIAL HISTORY:  Social History     Social History     Marital status:      Spouse name: librado     Number of children: 0     Years of education: 17     Occupational History     retired      Social History Main Topics     Smoking status: Former Smoker     Packs/day: 0.50     Years: 20.00     Quit date: 1975     Smokeless tobacco: Former User     Alcohol use 6.0 - 8.4 oz/week     10 - 14 Standard drinks or equivalent per week      Comment: 2 drinks a week     Drug use: No     Sexual activity: No     Other Topics Concern     Not on file     Social History Narrative   He quit smoking in .  He drinks 1-3 glasses of wine a night with dinner.  He is retired, and used to work as a .  He lives with his wife in [place].  His cousin had lung cancer and  around age 69.  Otherwise, he denies family history of cancer.        FAMILY HISTORY:  Family History   Problem Relation Age of Onset     CEREBROVASCULAR DISEASE Father      x 2     Hypertension Mother      C.A.D. Mother      CANCER Mother      skin     Eye Disorder Mother      glaucoma      "Prostate Cancer No family hx of      Cancer - colorectal No family hx of          PHYSICAL EXAM:  Vital signs:  /82  Pulse 86  Temp 97.8  F (36.6  C) (Tympanic)  Resp 16  Ht 1.727 m (5' 8\")  Wt 97.1 kg (214 lb)  SpO2 98%  BMI 32.54 kg/m2   GENERAL/CONSTITUTIONAL: No acute distress.  EYES: No scleral icterus.  RESPIRATORY: Clear to auscultation bilaterally.  CARDIOVASCULAR: Regular rate and rhythm.  GASTROINTESTINAL: Bowel sounds present, non-tender to palpation.  MUSCULOSKELETAL: Warm and well-perfused.  NEUROLOGIC: Alert, oriented, answers questions appropriately.  INTEGUMENTARY: No jaundice.      LABS:  CBC RESULTS:   Recent Labs   Lab Test  05/17/18   1500   WBC  69.5*   RBC  3.00*   HGB  10.3*   HCT  31.9*   MCV  106*   MCH  34.3*   MCHC  32.3   RDW  15.7*   PLT  82*     Recent Labs   Lab Test  05/15/18   0820  03/02/18   1254   NA  140  139   POTASSIUM  5.0  4.8   CHLORIDE  108  106   CO2  26  28   ANIONGAP  6  5   GLC  125*  120*   BUN  29  27   CR  1.38*  1.22   MAGDALENO  9.1  8.9     Lab Results   Component Value Date    AST 22 05/15/2018     Lab Results   Component Value Date    ALT 29 05/15/2018     Lab Results   Component Value Date    BILICONJ 0.0 04/29/2005      Lab Results   Component Value Date    BILITOTAL 0.5 05/15/2018     Lab Results   Component Value Date    ALBUMIN 4.2 05/15/2018     Lab Results   Component Value Date    PROTTOTAL 7.5 05/15/2018      Lab Results   Component Value Date    ALKPHOS 52 05/15/2018         Component      Latest Ref Rng & Units 5/15/2018   Lactate Dehydrogenase      85 - 227 U/L 237 (H)   Uric Acid      3.5 - 7.2 mg/dL 7.5 (H)       Peripheral smear 5/15/18:    FINAL DIAGNOSIS:   Peripheral blood.   - Leukocytosis with absolute lymphopenia consistent with history of CLL.     Neutropenia.  Anemia, macrocytic.   Thrombocytopenia.         PATHOLOGY:  Bone marrow biopsy 11/21/17:  FINAL DIAGNOSIS:   Peripheral blood demonstrating macrocytic anemia with " thrombocytopenia     Bone marrow trephine biopsy and aspirate demonstrating hypercellular   bone marrow involved by chronic lymphocytic leukemia/small lymphocytic   lymphoma, 13% ringed sideroblasts identified (see comment)     COMMENT:   Immunophenotyping studies demonstrate a kappa monotypic CD5 positive   B-cell population most compatible with chronic lymphocytic leukemia.  An   immunoperoxidase stain for cyclin D1 is pending.  Dysplastic changes are   not identified within the erythroid series.  However, the presence of   increased numbers of ringed sideroblasts raises the possibility of an   associated myelodysplastic syndrome, particularly in light of the noted   increased mean red cell volume and associated thrombocytopenia.  The   thrombocytopenia may also reflect reduced numbers of megakaryocytes   given the involvement of marrow by chronic lymphocytic leukemia.  Many   cases exhibiting ringed sideroblasts are metabolic in origin, without   significant risk of progression to acute leukemia, and these typically   do not show dysplastic morphology as is the case with the current   specimen.  Moreover, 15% ringed sideroblasts are required for a   diagnosis of  RARS. Cytogenetic studies are pending.     Flow  INTERPRETATION:   Bone Marrow, Left:        CD5 positive Kappa monotypic B cells (82%)     COMMENT:   The clonal B-cell population present in this specimen has a similar   immunophenotype as reported previously in the blood from this patient   ([id number]).  The immunophenotypic findings are suggestive of marrow   involvement by small lymphocytic lymphoma/chronic lymphocytic leukemia   (SLL/CLL). Large cells may not survive specimen processing.  There may   be peripheral blood contamination. Final interpretation requires   correlation with results of other ancillary studies, morphologic and   clinical features.     ISCN:   46,XY,del(13)(q12q14)[2]/45,X,-Y[3]/46,XY[15].nuc    alton(ATMx2,TP53x1)[4/200],(F90L188v7,YZZT3m9)[21/200]     INTERPRETATION:   Two (10%) of the metaphase cells analyzed comprised a clone   characterized by a deletion within the proximal long arm of a chromosome   13 as the sole karyotypic abnormality. Additionally, three (15%)   metaphases had loss of the Y chromosome as the sole abnormality.     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     Loss of the Y chromosome, as seen in this case, has been reported both   as an acquired clonal abnormality associated with hematologic (primarily   myeloid) disorders and also as a clinically insignificant finding   associated with the normal aging process in adult males. When presenting   as a clonal abnormality, loss of Y is typically seen in addition to   other cytogenetic abnormalities. This patient's age and the absence of   other chromosomal abnormalities suggest that the loss of Y represents an   age-related finding in this case.       IMAGING:  CT c/a/p 11/29/17:  FINDINGS:   Chest: Possible minor fissure lymph node on series 3, image 45 along  the right minor fissure measuring 0.3 cm. Anterior right upper lobe  nodule measures 0.3 cm on image 40. A lateral right lower lobe nodule  measures 0.4 cm on image 47. Bibasilar atelectasis is noted. No  infiltrate or consolidation. No pleural or pericardial fluid. Heart is  normal in size. Esophagus is unremarkable. A few small mediastinal and  hilar lymph nodes are present, but none exceed size criteria for  abnormality. Bilateral axillary lymph nodes are also present. The  largest is in the left axilla measuring 1.7 x 1.1 cm on series 2,  image 18. Coronary artery calcification is present. Aorta demonstrates  scattered calcified plaque without aneurysm.     Abdomen: A large infrarenal abdominal aortic aneurysm is  noted  measuring 7.6 x 6.7 cm on series 2, image 80 and contains a moderate  amount of mural thrombus. There is slight haziness around the margin  of the aneurysm sac of uncertain significance as a few scattered lymph  nodes are noted in this area. Periaortic inflammation is possible. No  obvious retroperitoneal hemorrhage at this time. A periaortic node on  series 2, image 76 near the superior aspect of the aneurysm measures  1.9 x 1.4 cm. Aneurysm does not extend down into the common iliac  arteries. Probable calcified gallstone in the gallbladder on series 2,  image 69. Gallbladder is otherwise within normal limits. The liver,  pancreas, adrenal glands and kidneys are unremarkable. No  hydronephrosis or renal calculi. Spleen is prominent in size without  focal mass measuring 15.7 cm in AP dimension. Tiny cyst posterior  spleen is noted on series 2, image 64. No evidence of bowel  obstruction or diverticulitis.     Pelvis: The bladder and rectum are unremarkable. No enlarged pelvic  lymph nodes or free fluid. Bone window examination demonstrates  degenerative spine changes.         IMPRESSION:  1. Mildly enlarged left axillary lymph node and periaortic lymph nodes  in the retroperitoneum of the abdomen. Spleen is also enlarged.  Findings would be consistent with patient's underlying diagnosis of  CLL. Follow up as clinically warranted.  2. Infrarenal abdominal aortic aneurysm measuring up to 7.6 cm in  diameter. Mild indistinctness of the aneurysm wall could be due to  inflammation. No retroperitoneal hemorrhage is currently evident.  Recommend followup with vascular surgery or interventional radiology  for further assessment.    CT angiogram 1/16/18:  1. New endograft. Endoleak identified. 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 Aneurysm sac size is not significantly changed when  compared to the previous exam.  2. Mild splenomegaly.  3. Colonic diverticulosis.      ASSESSMENT/PLAN:  Austin Garza is  a 77 year old male with:    1) CLL/SLL: BLACKWOOD stage III, with lymphocytosis, lymphadenopathy, splenomegaly, and anemia.  He has mild thrombocytopenia as well, but is above 100K.  He presented with leukocytosis with WBC of 61.7K, hemoglobin of 10.4, and platelet count of 115K on diagnosis.  Bone marrow biopsy and flow cytometry confirmed CLL/SLL.  CT scan shows mildly enlarged left axillary lymph node and periaortic lymph nodes in the retroperitoneum of the abdomen, with enlarged spleen.      His WBC lex to 67.5K this week.  It lex quite a bit from 2 months ago when it was 38.2K, but actually it was around the 60K's when he was initially diagnosed. Hemoglobin remains low, but stable.  His platelet count decreased to 82K today.  I discussed these changes with Austin.  He has symptoms of fatigue and sweats around his neck at night.  Elevated lymphocyte count is not necessarily an indication to start treatment.  However, I discussed that if these symptoms are thought to be related to the CLL and if his lymphocyte count continues to rise rapidly or if his cytopenias worsen, this may warrant starting treatment.  He says that he would be okay with that if it is needed.  I'd like to see a trend in his counts.  We will have him do CBC/diff weekly for the next few weeks.  I will see him back in 4 weeks to see the trend and how his symptoms are.  He may be a candidate for ibrutinib.    2) Abdominal aortic aneurysm: measures up to 7.6 cm on CT scan, incidentally found.  He underwent EVAR on 12/4/17.  -follow-up with vascular surgery; he is to follow-up with repeat CTA around July 2018    3) Diabetes, hypertension:  -following with PCP      I spent a total of 25 minutes with the patient, with over >50% of the time in counseling and/or coordination of care.      Breana Perry MD  Hematology/Oncology  [facility]

## 2018-05-21 ENCOUNTER — TRANSFERRED RECORDS (OUTPATIENT)
Dept: HEALTH INFORMATION MANAGEMENT | Facility: CLINIC | Age: 78
End: 2018-05-21

## 2018-05-24 ENCOUNTER — HOSPITAL ENCOUNTER (OUTPATIENT)
Facility: CLINIC | Age: 78
Setting detail: SPECIMEN
Discharge: HOME | End: 2018-05-24
Attending: INTERNAL MEDICINE | Admitting: INTERNAL MEDICINE
Payer: MEDICARE

## 2018-05-24 DIAGNOSIS — C91.10 CLL (CHRONIC LYMPHOCYTIC LEUKEMIA) (H): ICD-10-CM

## 2018-05-24 LAB
ANISOCYTOSIS BLD QL SMEAR: SLIGHT
BASOPHILS # BLD AUTO: 0 10E9/L (ref 0–0.2)
BASOPHILS NFR BLD AUTO: 0 %
DACRYOCYTES BLD QL SMEAR: SLIGHT
DIFFERENTIAL METHOD BLD: ABNORMAL
EOSINOPHIL # BLD AUTO: 0 10E9/L (ref 0–0.7)
EOSINOPHIL NFR BLD AUTO: 0 %
ERYTHROCYTE [DISTWIDTH] IN BLOOD BY AUTOMATED COUNT: 15.4 % (ref 10–15)
HCT VFR BLD AUTO: 31 % (ref 40–53)
HGB BLD-MCNC: 9.9 G/DL (ref 13.3–17.7)
LYMPHOCYTES # BLD AUTO: 63 10E9/L (ref 0.8–5.3)
LYMPHOCYTES NFR BLD AUTO: 93 %
MACROCYTES BLD QL SMEAR: PRESENT
MCH RBC QN AUTO: 33.4 PG (ref 26.5–33)
MCHC RBC AUTO-ENTMCNC: 31.9 G/DL (ref 31.5–36.5)
MCV RBC AUTO: 105 FL (ref 78–100)
MICROCYTES BLD QL SMEAR: PRESENT
MONOCYTES # BLD AUTO: 0.7 10E9/L (ref 0–1.3)
MONOCYTES NFR BLD AUTO: 1 %
NEUTROPHILS # BLD AUTO: 4.1 10E9/L (ref 1.6–8.3)
NEUTROPHILS NFR BLD AUTO: 6 %
OVALOCYTES BLD QL SMEAR: SLIGHT
PLATELET # BLD AUTO: 114 10E9/L (ref 150–450)
PLATELET # BLD EST: ABNORMAL 10*3/UL
RBC # BLD AUTO: 2.96 10E12/L (ref 4.4–5.9)
RBC INCLUSIONS BLD: SLIGHT
WBC # BLD AUTO: 67.7 10E9/L (ref 4–11)

## 2018-05-24 PROCEDURE — 36415 COLL VENOUS BLD VENIPUNCTURE: CPT

## 2018-05-24 PROCEDURE — 85025 COMPLETE CBC W/AUTO DIFF WBC: CPT | Performed by: INTERNAL MEDICINE

## 2018-05-24 NOTE — PROGRESS NOTES
Infusion Nursing Note:  Austin Garza presents today for labs.    Patient seen by provider today: No   present during visit today: Not Applicable.    Note: Pt here for weekly CBC.  Following lab trend for next few weeks.  Elevated WBC stable from last week and Plt level improved.    Intravenous Access:  Lab draw site Right AC, Needle type butterfly, Gauge 23.  Labs drawn without difficulty.    Treatment Conditions:  Not Applicable.      Post Infusion Assessment:  Site patent and intact, free from redness, edema or discomfort.    Discharge Plan:   AVS to patient via MYCHART.  Patient will return  Weekly X2  for labs at [facility] and then RTC here on 6/14/18 for labs and f/u with Dr Perry for next appointment.   Patient discharged in stable condition accompanied by: self.    Beryl Mir RN

## 2018-05-31 ENCOUNTER — TELEPHONE (OUTPATIENT)
Dept: ONCOLOGY | Facility: CLINIC | Age: 78
End: 2018-05-31

## 2018-05-31 ENCOUNTER — HOSPITAL ENCOUNTER (OUTPATIENT)
Facility: CLINIC | Age: 78
Setting detail: SPECIMEN
Discharge: HOME | End: 2018-05-31
Attending: INTERNAL MEDICINE | Admitting: INTERNAL MEDICINE
Payer: MEDICARE

## 2018-05-31 ENCOUNTER — ONCOLOGY VISIT (OUTPATIENT)
Dept: ONCOLOGY | Facility: CLINIC | Age: 78
End: 2018-05-31
Attending: INTERNAL MEDICINE
Payer: MEDICARE

## 2018-05-31 DIAGNOSIS — C91.10 CLL (CHRONIC LYMPHOCYTIC LEUKEMIA) (H): ICD-10-CM

## 2018-05-31 LAB
ALBUMIN SERPL-MCNC: 4 G/DL (ref 3.4–5)
ALP SERPL-CCNC: 52 U/L (ref 40–150)
ALT SERPL W P-5'-P-CCNC: 22 U/L (ref 0–70)
ANION GAP SERPL CALCULATED.3IONS-SCNC: 7 MMOL/L (ref 3–14)
ANISOCYTOSIS BLD QL SMEAR: SLIGHT
AST SERPL W P-5'-P-CCNC: 20 U/L (ref 0–45)
BASOPHILS # BLD AUTO: 0 10E9/L (ref 0–0.2)
BASOPHILS NFR BLD AUTO: 0 %
BILIRUB SERPL-MCNC: 0.4 MG/DL (ref 0.2–1.3)
BUN SERPL-MCNC: 21 MG/DL (ref 7–30)
CALCIUM SERPL-MCNC: 8.9 MG/DL (ref 8.5–10.1)
CHLORIDE SERPL-SCNC: 108 MMOL/L (ref 94–109)
CO2 SERPL-SCNC: 25 MMOL/L (ref 20–32)
CREAT SERPL-MCNC: 1.33 MG/DL (ref 0.66–1.25)
DACRYOCYTES BLD QL SMEAR: SLIGHT
DIFFERENTIAL METHOD BLD: ABNORMAL
EOSINOPHIL # BLD AUTO: 0 10E9/L (ref 0–0.7)
EOSINOPHIL NFR BLD AUTO: 0 %
ERYTHROCYTE [DISTWIDTH] IN BLOOD BY AUTOMATED COUNT: 15.9 % (ref 10–15)
GFR SERPL CREATININE-BSD FRML MDRD: 52 ML/MIN/1.7M2
GLUCOSE SERPL-MCNC: 158 MG/DL (ref 70–99)
HCT VFR BLD AUTO: 31.5 % (ref 40–53)
HGB BLD-MCNC: 10 G/DL (ref 13.3–17.7)
LDH SERPL L TO P-CCNC: 242 U/L (ref 85–227)
LYMPHOCYTES # BLD AUTO: 59.2 10E9/L (ref 0.8–5.3)
LYMPHOCYTES NFR BLD AUTO: 92 %
MACROCYTES BLD QL SMEAR: PRESENT
MCH RBC QN AUTO: 33.6 PG (ref 26.5–33)
MCHC RBC AUTO-ENTMCNC: 31.7 G/DL (ref 31.5–36.5)
MCV RBC AUTO: 106 FL (ref 78–100)
MICROCYTES BLD QL SMEAR: PRESENT
MONOCYTES # BLD AUTO: 1.3 10E9/L (ref 0–1.3)
MONOCYTES NFR BLD AUTO: 2 %
NEUTROPHILS # BLD AUTO: 3.9 10E9/L (ref 1.6–8.3)
NEUTROPHILS NFR BLD AUTO: 6 %
OVALOCYTES BLD QL SMEAR: SLIGHT
PLATELET # BLD AUTO: 115 10E9/L (ref 150–450)
PLATELET # BLD EST: ABNORMAL 10*3/UL
POTASSIUM SERPL-SCNC: 5 MMOL/L (ref 3.4–5.3)
PROT SERPL-MCNC: 7 G/DL (ref 6.8–8.8)
RBC # BLD AUTO: 2.98 10E12/L (ref 4.4–5.9)
SODIUM SERPL-SCNC: 140 MMOL/L (ref 133–144)
URATE SERPL-MCNC: 6.7 MG/DL (ref 3.5–7.2)
WBC # BLD AUTO: 64.3 10E9/L (ref 4–11)

## 2018-05-31 PROCEDURE — 80053 COMPREHEN METABOLIC PANEL: CPT | Performed by: INTERNAL MEDICINE

## 2018-05-31 PROCEDURE — 36415 COLL VENOUS BLD VENIPUNCTURE: CPT

## 2018-05-31 PROCEDURE — 83615 LACTATE (LD) (LDH) ENZYME: CPT | Performed by: INTERNAL MEDICINE

## 2018-05-31 PROCEDURE — 85025 COMPLETE CBC W/AUTO DIFF WBC: CPT | Performed by: INTERNAL MEDICINE

## 2018-05-31 PROCEDURE — 84550 ASSAY OF BLOOD/URIC ACID: CPT | Performed by: INTERNAL MEDICINE

## 2018-05-31 NOTE — TELEPHONE ENCOUNTER
Received critical WBC of 64.3K.  Reported results to Dr. Perry.  No additional interventions needed at this time per VORB from Dr. Orlando MD to SHAMIR Almanzar.  Hollis Lucas RN, BSN, OCN  [facility]  Patient Care Coordinator

## 2018-05-31 NOTE — MR AVS SNAPSHOT
After Visit Summary   5/31/2018    Austin Garza    MRN: [id number]           Patient Information     Date Of Birth          1940        Visit Information        Provider Department      5/31/2018 8:30 AM RH ONCOLOGY NURSE [facility]        Today's Diagnoses     CLL (chronic lymphocytic leukemia) (H)           Follow-ups after your visit        Your next 10 appointments already scheduled     May 31, 2018  8:30 AM CDT   Return Visit with  ONCOLOGY NURSE   [facility] ([facility])    [facility]  [address]  [facility] [address]   [phone]            Jun 14, 2018  1:45 PM CDT   Return Visit with Breana Prery MD   [facility] ([facility])    [facility]  [address]  [facility] [address]   [phone]            Jul 17, 2018  9:15 AM CDT   CTA ANGIOGRAM ABDOMEN PELVIS with SHCT1   [facility] CT ([facility])    [address] [place] [address]   [phone]           Please bring any scans or X-rays taken at other hospitals, if similar tests were done. Also bring a list of your medicines, including vitamins, minerals and over-the-counter drugs. It is safest to leave personal items at home.  Be sure to tell your doctor:   If you have any allergies.   If there s any chance you are pregnant.   If you are breastfeeding.    If you have diabetes as your medication may need to be adjusted for this exam.  You will have contrast for this exam. To prepare:   Do not eat or drink for 2 hours before your exam. If you need to take medicine, you may take it with small sips of water. (We may ask you to take liquid medicine as well.)   The day before your exam, drink extra fluids at least six 8-ounce glasses (unless your doctor tells you to restrict your fluids).  Patients over 70 or  patients with diabetes or kidney problems:   If you haven t had a blood test (creatinine test) within the last 30 days, the Cardiologist/Radiologist may require you to get this test prior to your exam.  Please wear loose clothing, such as a sweat suit or jogging clothes. Avoid snaps, zippers and other metal. We may ask you to undress and put on a hospital gown.  If you have any questions, please call the Imaging Department where you will have your exam.            Jul 17, 2018 10:45 AM CDT   Return Visit with Milton Cazares MD   [facility] ([facility])    [address]  [facility] [address] [phone]              Who to contact     If you have questions or need follow up information about today's clinic visit or your schedule please contact [facility] directly at [phone].  Normal or non-critical lab and imaging results will be communicated to you by [employer], letter or phone within 4 business days after the clinic has received the results. If you do not hear from us within 7 days, please contact the clinic through [employer] or phone. If you have a critical or abnormal lab result, we will notify you by phone as soon as possible.  Submit refill requests through [employer] or call your pharmacy and they will forward the refill request to us. Please allow 3 business days for your refill to be completed.          Additional Information About Your Visit        [employer] Information     [employer] gives you secure access to your electronic health record. If you see a primary care provider, you can also send messages to your care team and make appointments. If you have questions, please call your primary care clinic.  If you do not have a primary care provider, please call [phone] and they will assist you.        Care EveryWhere ID     This is your Care EveryWhere ID. This could be used by other  organizations to access your [place] medical records  [id number]         Blood Pressure from Last 3 Encounters:   05/17/18 130/82   03/06/18 (!) 154/96   02/01/18 131/89    Weight from Last 3 Encounters:   05/17/18 97.1 kg (214 lb)   03/06/18 94.8 kg (209 lb)   02/01/18 95.9 kg (211 lb 6.4 oz)              We Performed the Following     CBC with platelets differential     Comprehensive metabolic panel     Lactate Dehydrogenase     Uric acid        Primary Care Provider Office Phone # Fax #    Sandi Eastman [phone] [address] [place] [address]        Equal Access to Services     [facility]: Hadii gertrude Valencia, wajose torres, hector kaalmada michael, mina early. So [facility] [phone].    ATENCIÓN: Si habla español, tiene a edward disposición servicios gratuitos de asistencia lingüística. Llame al [phone].    We comply with applicable federal civil rights laws and [place] laws. We do not discriminate on the basis of race, color, national origin, age, disability, sex, sexual orientation, or gender identity.            Thank you!     Thank you for choosing [facility]  for your care. Our goal is always to provide you with excellent care. Hearing back from our patients is one way we can continue to improve our services. Please take a few minutes to complete the written survey that you may receive in the mail after your visit with us. Thank you!             Your Updated Medication List - Protect others around you: Learn how to safely use, store and throw away your medicines at www.disposemymeds.org.          This list is accurate as of 5/31/18  8:24 AM.  Always use your most recent med list.                   Brand Name Dispense Instructions for use Diagnosis    aspirin 325 MG tablet     90 tablet    Take 1 tablet (325 mg) by mouth every evening    Abdominal aortic aneurysm (AAA) without rupture (H)        blood glucose monitoring meter device kit     1 kit    Use to test blood sugars 2-3 times daily as directed. Brand per insurance formulary    Type II or unspecified type diabetes mellitus without mention of complication, not stated as uncontrolled       blood glucose monitoring test strip    ONETOUCH ULTRA    100 each    Test 2-3 times daily as directed, brand per insurance formulary.    Type 2 diabetes mellitus without complication, without long-term current use of insulin (H)       CENTRUM SILVER per tablet      Take 1 tablet by mouth daily        fluticasone 50 MCG/ACT spray    FLONASE    1 Bottle    Spray 2 sprays into both nostrils 2 times daily    Viral URI with cough       glimepiride 1 MG tablet    AMARYL    90 tablet    Take 1 tablet (1 mg) by mouth every morning (before breakfast)    Type 2 diabetes mellitus without complication, without long-term current use of insulin (H)       LIPITOR PO      Take 20 mg by mouth At Bedtime        losartan-hydrochlorothiazide 50-12.5 MG per tablet    HYZAAR    90 tablet    Take 1 tablet by mouth daily    Hypertension goal BP (blood pressure) < 140/90       metFORMIN 1000 MG tablet    GLUCOPHAGE    180 tablet    Take 1 tablet (1,000 mg) by mouth 2 times daily (with meals)    Type 2 diabetes mellitus without complication, without long-term current use of insulin (H)

## 2018-05-31 NOTE — PROGRESS NOTES
Medical Assistant Note:  Austin ANETTE Garza presents today for lab draw.    Patient seen by provider today: No.   present during visit today: Not Applicable.    Concerns: No Concerns.    Procedure:  Labs drawn: .    Post Assessment:  Labs drawn without difficulty: Yes.    Discharge Plan:  Departure Mode: Ambulatory.    Face to Face Time: 10.    Ita Pinto    DISCHARGE PLAN:  Next appointments: See patient instruction section  Departure Mode: Ambulatory  Accompanied by: self  0 minutes for nursing discharge (face to face time)   Ita Pinto, [facility]

## 2018-06-07 ENCOUNTER — ONCOLOGY VISIT (OUTPATIENT)
Dept: ONCOLOGY | Facility: CLINIC | Age: 78
End: 2018-06-07
Attending: INTERNAL MEDICINE
Payer: MEDICARE

## 2018-06-07 ENCOUNTER — HOSPITAL ENCOUNTER (OUTPATIENT)
Facility: CLINIC | Age: 78
Setting detail: SPECIMEN
Discharge: HOME | End: 2018-06-07
Attending: INTERNAL MEDICINE | Admitting: INTERNAL MEDICINE
Payer: MEDICARE

## 2018-06-07 DIAGNOSIS — C91.10 CLL (CHRONIC LYMPHOCYTIC LEUKEMIA) (H): ICD-10-CM

## 2018-06-07 LAB
ANISOCYTOSIS BLD QL SMEAR: SLIGHT
BASOPHILS # BLD AUTO: 0 10E9/L (ref 0–0.2)
BASOPHILS NFR BLD AUTO: 0 %
DACRYOCYTES BLD QL SMEAR: SLIGHT
DIFFERENTIAL METHOD BLD: ABNORMAL
ELLIPTOCYTES BLD QL SMEAR: SLIGHT
EOSINOPHIL # BLD AUTO: 0 10E9/L (ref 0–0.7)
EOSINOPHIL NFR BLD AUTO: 0 %
ERYTHROCYTE [DISTWIDTH] IN BLOOD BY AUTOMATED COUNT: 16 % (ref 10–15)
HCT VFR BLD AUTO: 30.9 % (ref 40–53)
HGB BLD-MCNC: 9.7 G/DL (ref 13.3–17.7)
LYMPHOCYTES # BLD AUTO: 62.4 10E9/L (ref 0.8–5.3)
LYMPHOCYTES NFR BLD AUTO: 93 %
MACROCYTES BLD QL SMEAR: PRESENT
MCH RBC QN AUTO: 33.9 PG (ref 26.5–33)
MCHC RBC AUTO-ENTMCNC: 31.4 G/DL (ref 31.5–36.5)
MCV RBC AUTO: 108 FL (ref 78–100)
MONOCYTES # BLD AUTO: 0 10E9/L (ref 0–1.3)
MONOCYTES NFR BLD AUTO: 0 %
NEUTROPHILS # BLD AUTO: 4.7 10E9/L (ref 1.6–8.3)
NEUTROPHILS NFR BLD AUTO: 7 %
PLATELET # BLD AUTO: 102 10E9/L (ref 150–450)
PLATELET # BLD EST: ABNORMAL 10*3/UL
POIKILOCYTOSIS BLD QL SMEAR: SLIGHT
RBC # BLD AUTO: 2.86 10E12/L (ref 4.4–5.9)
WBC # BLD AUTO: 67.1 10E9/L (ref 4–11)

## 2018-06-07 PROCEDURE — 85025 COMPLETE CBC W/AUTO DIFF WBC: CPT | Performed by: INTERNAL MEDICINE

## 2018-06-07 PROCEDURE — 36415 COLL VENOUS BLD VENIPUNCTURE: CPT

## 2018-06-07 NOTE — MR AVS SNAPSHOT
After Visit Summary   6/7/2018    Austin Garza    MRN: [id number]           Patient Information     Date Of Birth          1940        Visit Information        Provider Department      6/7/2018 8:30 AM  ONCOLOGY NURSE [facility]        Today's Diagnoses     CLL (chronic lymphocytic leukemia) (H)           Follow-ups after your visit        Your next 10 appointments already scheduled     Jun 14, 2018  1:45 PM CDT   Return Visit with Breana Perry MD   [facility] ([facility])    [facility]  [address]  [facility] [address]   [phone]            Jul 17, 2018  9:15 AM CDT   CTA ANGIOGRAM ABDOMEN PELVIS with SHCT1   [facility] CT ([facility])    [address] [place] [id number] [phone]           Please bring any scans or X-rays taken at other hospitals, if similar tests were done. Also bring a list of your medicines, including vitamins, minerals and over-the-counter drugs. It is safest to leave personal items at home.  Be sure to tell your doctor:   If you have any allergies.   If there s any chance you are pregnant.   If you are breastfeeding.    If you have diabetes as your medication may need to be adjusted for this exam.  You will have contrast for this exam. To prepare:   Do not eat or drink for 2 hours before your exam. If you need to take medicine, you may take it with small sips of water. (We may ask you to take liquid medicine as well.)   The day before your exam, drink extra fluids at least six 8-ounce glasses (unless your doctor tells you to restrict your fluids).  Patients over 70 or patients with diabetes or kidney problems:   If you haven t had a blood test (creatinine test) within the last 30 days, the Cardiologist/Radiologist may require you to get this test prior to your exam.  Please wear loose clothing, such as a  sweat suit or jogging clothes. Avoid snaps, zippers and other metal. We may ask you to undress and put on a hospital gown.  If you have any questions, please call the Imaging Department where you will have your exam.            Jul 17, 2018 10:45 AM CDT   Return Visit with Milton Cazares MD   [facility] ([facility])    [address]  [place] [address] [phone]              Who to contact     If you have questions or need follow up information about today's clinic visit or your schedule please contact [facility] directly at [phone].  Normal or non-critical lab and imaging results will be communicated to you by Sokooshart, letter or phone within 4 business days after the clinic has received the results. If you do not hear from us within 7 days, please contact the clinic through Sokooshart or phone. If you have a critical or abnormal lab result, we will notify you by phone as soon as possible.  Submit refill requests through Plusmo or call your pharmacy and they will forward the refill request to us. Please allow 3 business days for your refill to be completed.          Additional Information About Your Visit        Plusmo Information     Plusmo gives you secure access to your electronic health record. If you see a primary care provider, you can also send messages to your care team and make appointments. If you have questions, please call your primary care clinic.  If you do not have a primary care provider, please call [phone] and they will assist you.        Care EveryWhere ID     This is your Care EveryWhere ID. This could be used by other organizations to access your [facility] medical records  [id number]         Blood Pressure from Last 3 Encounters:   05/17/18 130/82   03/06/18 (!) 154/96   02/01/18 131/89    Weight from Last 3 Encounters:   05/17/18 97.1 kg (214 lb)   03/06/18 94.8  kg (209 lb)   02/01/18 95.9 kg (211 lb 6.4 oz)              We Performed the Following     CBC with platelets differential        Primary Care Provider Office Phone # Fax #    Sandi Eastman [phone] [address] [place] [address]        Equal Access to Services     [facility]: Hadii aad ku hadasho Soomaali, waaxda luqadaha, qaybta kaalmada adeegyada, waxay idiin hayaan adeeg lukelucio laLesleyaan . So [facility] [phone].    ATENCIÓN: Si habla español, tiene a edward disposición servicios gratuitos de asistencia lingüística. Llame al [phone].    We comply with applicable federal civil rights laws and [place] laws. We do not discriminate on the basis of race, color, national origin, age, disability, sex, sexual orientation, or gender identity.            Thank you!     Thank you for choosing [facility]  for your care. Our goal is always to provide you with excellent care. Hearing back from our patients is one way we can continue to improve our services. Please take a few minutes to complete the written survey that you may receive in the mail after your visit with us. Thank you!             Your Updated Medication List - Protect others around you: Learn how to safely use, store and throw away your medicines at www.disposemymeds.org.          This list is accurate as of 6/7/18  8:39 AM.  Always use your most recent med list.                   Brand Name Dispense Instructions for use Diagnosis    aspirin 325 MG tablet     90 tablet    Take 1 tablet (325 mg) by mouth every evening    Abdominal aortic aneurysm (AAA) without rupture (H)       blood glucose monitoring meter device kit     1 kit    Use to test blood sugars 2-3 times daily as directed. Brand per insurance formulary    Type II or unspecified type diabetes mellitus without mention of complication, not stated as uncontrolled       blood glucose monitoring test strip    ONETOUCH ULTRA    100 each     Test 2-3 times daily as directed, brand per insurance formulary.    Type 2 diabetes mellitus without complication, without long-term current use of insulin (H)       CENTRUM SILVER per tablet      Take 1 tablet by mouth daily        fluticasone 50 MCG/ACT spray    FLONASE    1 Bottle    Spray 2 sprays into both nostrils 2 times daily    Viral URI with cough       glimepiride 1 MG tablet    AMARYL    90 tablet    Take 1 tablet (1 mg) by mouth every morning (before breakfast)    Type 2 diabetes mellitus without complication, without long-term current use of insulin (H)       LIPITOR PO      Take 20 mg by mouth At Bedtime        losartan-hydrochlorothiazide 50-12.5 MG per tablet    HYZAAR    90 tablet    Take 1 tablet by mouth daily    Hypertension goal BP (blood pressure) < 140/90       metFORMIN 1000 MG tablet    GLUCOPHAGE    180 tablet    Take 1 tablet (1,000 mg) by mouth 2 times daily (with meals)    Type 2 diabetes mellitus without complication, without long-term current use of insulin (H)

## 2018-06-07 NOTE — LETTER
6/7/2018         RE: Austin Garza  [address]  [place] [address]        Dear Colleague,    Thank you for referring your patient, Austin Garza, to the [facility]CER [facility]ARE. Please see a copy of my visit note below.    Medical Assistant Note:  Austin Garza presents today for Blood Draw.    Patient seen by provider today: No.   present during visit today: Not Applicable.    Concerns: No Concerns.    Procedure:  Labs drawn: Yes.    Post Assessment:  Labs drawn without difficulty: Yes.    Discharge Plan:  Patient discharged in stable condition accompanied by: self.    Face to Face Time: 10 mins.    Cherry Rodriguez              Again, thank you for allowing me to participate in the care of your patient.        Sincerely,        [facility] Oncology Nurse

## 2018-06-18 ENCOUNTER — HOSPITAL ENCOUNTER (OUTPATIENT)
Facility: CLINIC | Age: 78
Setting detail: SPECIMEN
Discharge: HOME | End: 2018-06-18
Attending: INTERNAL MEDICINE | Admitting: INTERNAL MEDICINE
Payer: MEDICARE

## 2018-06-18 ENCOUNTER — ONCOLOGY VISIT (OUTPATIENT)
Dept: ONCOLOGY | Facility: CLINIC | Age: 78
End: 2018-06-18
Attending: INTERNAL MEDICINE
Payer: MEDICARE

## 2018-06-18 DIAGNOSIS — C91.10 CLL (CHRONIC LYMPHOCYTIC LEUKEMIA) (H): ICD-10-CM

## 2018-06-18 LAB
ANISOCYTOSIS BLD QL SMEAR: SLIGHT
BASOPHILS # BLD AUTO: 0 10E9/L (ref 0–0.2)
BASOPHILS NFR BLD AUTO: 0 %
DIFFERENTIAL METHOD BLD: ABNORMAL
EOSINOPHIL # BLD AUTO: 0 10E9/L (ref 0–0.7)
EOSINOPHIL NFR BLD AUTO: 0 %
ERYTHROCYTE [DISTWIDTH] IN BLOOD BY AUTOMATED COUNT: 16 % (ref 10–15)
HCT VFR BLD AUTO: 32.5 % (ref 40–53)
HGB BLD-MCNC: 10.2 G/DL (ref 13.3–17.7)
LYMPHOCYTES # BLD AUTO: 60.4 10E9/L (ref 0.8–5.3)
LYMPHOCYTES NFR BLD AUTO: 92 %
MACROCYTES BLD QL SMEAR: PRESENT
MCH RBC QN AUTO: 33.6 PG (ref 26.5–33)
MCHC RBC AUTO-ENTMCNC: 31.4 G/DL (ref 31.5–36.5)
MCV RBC AUTO: 107 FL (ref 78–100)
MONOCYTES # BLD AUTO: 1.3 10E9/L (ref 0–1.3)
MONOCYTES NFR BLD AUTO: 2 %
NEUTROPHILS # BLD AUTO: 3.9 10E9/L (ref 1.6–8.3)
NEUTROPHILS NFR BLD AUTO: 6 %
PLATELET # BLD AUTO: 128 10E9/L (ref 150–450)
PLATELET # BLD EST: ABNORMAL 10*3/UL
POIKILOCYTOSIS BLD QL SMEAR: SLIGHT
RBC # BLD AUTO: 3.04 10E12/L (ref 4.4–5.9)
WBC # BLD AUTO: 65.6 10E9/L (ref 4–11)

## 2018-06-18 PROCEDURE — 85025 COMPLETE CBC W/AUTO DIFF WBC: CPT | Performed by: INTERNAL MEDICINE

## 2018-06-18 PROCEDURE — 36415 COLL VENOUS BLD VENIPUNCTURE: CPT

## 2018-06-18 NOTE — LETTER
6/18/2018         RE: Austin Garza  [address] [facility] [address]        Dear Colleague,    Thank you for referring your patient, Austin Garza, to the [facility]ARE. Please see a copy of my visit note below.    Medical Assistant Note:  Austin Garza presents today for lab draw.    Patient seen by provider today: No.   present during visit today: Not Applicable.    Concerns: No Concerns.    Procedure:  Labs drawn: .    Post Assessment:  Labs drawn without difficulty: Yes.    Discharge Plan:  Departure Mode: Ambulatory.    Face to Face Time: 10.    Ita Pinto              Again, thank you for allowing me to participate in the care of your patient.        Sincerely,        [facility] On[facility] Nurse

## 2018-06-18 NOTE — MR AVS SNAPSHOT
After Visit Summary   6/18/2018    Austin Garza    MRN: [id number]           Patient Information     Date Of Birth          1940        Visit Information        Provider Department      6/18/2018 1:30 PM  ONCOLOGY NURSE [facility]        Today's Diagnoses     CLL (chronic lymphocytic leukemia) (H)           Follow-ups after your visit        Your next 10 appointments already scheduled     Jul 17, 2018  9:15 AM CDT   CTA ANGIOGRAM ABDOMEN PELVIS with SHCT1   [facility] CT ([facility])    [address] [facility] [address] [phone]           Please bring any scans or X-rays taken at other hospitals, if similar tests were done. Also bring a list of your medicines, including vitamins, minerals and over-the-counter drugs. It is safest to leave personal items at home.  Be sure to tell your doctor:   If you have any allergies.   If there s any chance you are pregnant.   If you are breastfeeding.    If you have diabetes as your medication may need to be adjusted for this exam.  You will have contrast for this exam. To prepare:   Do not eat or drink for 2 hours before your exam. If you need to take medicine, you may take it with small sips of water. (We may ask you to take liquid medicine as well.)   The day before your exam, drink extra fluids at least six 8-ounce glasses (unless your doctor tells you to restrict your fluids).  Patients over 70 or patients with diabetes or kidney problems:   If you haven t had a blood test (creatinine test) within the last 30 days, the Cardiologist/Radiologist may require you to get this test prior to your exam.  Please wear loose clothing, such as a sweat suit or jogging clothes. Avoid snaps, zippers and other metal. We may ask you to undress and put on a hospital gown.  If you have any questions, please call the Imaging Department where you will have your exam.            Jul 17, 2018 10:45 AM CDT    Return Visit with Milton Cazares MD   [facility] ([facility])    [address]  [place] [id number] [phone]            Jul 19, 2018  3:15 PM CDT   Return Visit with Breana Peryr MD   [facility] ([facility])    [facility]  [address]  [place] [address] [phone]              Who to contact     If you have questions or need follow up information about today's clinic visit or your schedule please contact [facility] CANCER CARE directly at [phone].  Normal or non-critical lab and imaging results will be communicated to you by TraitWarehart, letter or phone within 4 business days after the clinic has received the results. If you do not hear from us within 7 days, please contact the clinic through TraitWarehart or phone. If you have a critical or abnormal lab result, we will notify you by phone as soon as possible.  Submit refill requests through Cell Guidance Systems or call your pharmacy and they will forward the refill request to us. Please allow 3 business days for your refill to be completed.          Additional Information About Your Visit        TraitWareharCellca Information     Cell Guidance Systems gives you secure access to your electronic health record. If you see a primary care provider, you can also send messages to your care team and make appointments. If you have questions, please call your primary care clinic.  If you do not have a primary care provider, please call [phone] and they will assist you.        Care EveryWhere ID     This is your Care EveryWhere ID. This could be used by other organizations to access your [facility] medical records  [id number]         Blood Pressure from Last 3 Encounters:   05/17/18 130/82   03/06/18 (!) 154/96   02/01/18 131/89    Weight from Last 3 Encounters:   05/17/18 97.1 kg (214 lb)   03/06/18 94.8 kg  (209 lb)   02/01/18 95.9 kg (211 lb 6.4 oz)              We Performed the Following     CBC with platelets differential        Primary Care Provider Office Phone # Fax #    Sandi Eastman [phone] [address] [place] [address]        Equal Access to Services     [facility] : Hadii aad ku hadasho Soomaali, waaxda luqadaha, qaybta kaalmada adeegyada, waxay idiin hayaan adeeg shell laLesleyjennifern ah. So [facility] [phone].    ATENCIÓN: Si habla español, tiene a edward disposición servicios gratuitos de asistencia lingüística. Llame al [phone].    We comply with applicable federal civil rights laws and [place] laws. We do not discriminate on the basis of race, color, national origin, age, disability, sex, sexual orientation, or gender identity.            Thank you!     Thank you for choosing [facility]  for your care. Our goal is always to provide you with excellent care. Hearing back from our patients is one way we can continue to improve our services. Please take a few minutes to complete the written survey that you may receive in the mail after your visit with us. Thank you!             Your Updated Medication List - Protect others around you: Learn how to safely use, store and throw away your medicines at www.disposemymeds.org.          This list is accurate as of 6/18/18  1:45 PM.  Always use your most recent med list.                   Brand Name Dispense Instructions for use Diagnosis    aspirin 325 MG tablet     90 tablet    Take 1 tablet (325 mg) by mouth every evening    Abdominal aortic aneurysm (AAA) without rupture (H)       blood glucose monitoring meter device kit     1 kit    Use to test blood sugars 2-3 times daily as directed. Brand per insurance formulary    Type II or unspecified type diabetes mellitus without mention of complication, not stated as uncontrolled       blood glucose monitoring test strip    ONETOUCH ULTRA    100 each     Test 2-3 times daily as directed, brand per insurance formulary.    Type 2 diabetes mellitus without complication, without long-term current use of insulin (H)       CENTRUM SILVER per tablet      Take 1 tablet by mouth daily        fluticasone 50 MCG/ACT spray    FLONASE    1 Bottle    Spray 2 sprays into both nostrils 2 times daily    Viral URI with cough       glimepiride 1 MG tablet    AMARYL    90 tablet    Take 1 tablet (1 mg) by mouth every morning (before breakfast)    Type 2 diabetes mellitus without complication, without long-term current use of insulin (H)       LIPITOR PO      Take 20 mg by mouth At Bedtime        losartan-hydrochlorothiazide 50-12.5 MG per tablet    HYZAAR    90 tablet    Take 1 tablet by mouth daily    Hypertension goal BP (blood pressure) < 140/90       metFORMIN 1000 MG tablet    GLUCOPHAGE    180 tablet    Take 1 tablet (1,000 mg) by mouth 2 times daily (with meals)    Type 2 diabetes mellitus without complication, without long-term current use of insulin (H)

## 2018-06-18 NOTE — PROGRESS NOTES
Medical Assistant Note:  Austin Garza presents today for lab draw.    Patient seen by provider today: No.   present during visit today: Not Applicable.    Concerns: No Concerns.    Procedure:  Labs drawn: .    Post Assessment:  Labs drawn without difficulty: Yes.    Discharge Plan:  Departure Mode: Ambulatory.    Face to Face Time: 10.    Ita Pinto

## 2018-06-27 ENCOUNTER — HOSPITAL ENCOUNTER (OUTPATIENT)
Facility: CLINIC | Age: 78
Setting detail: SPECIMEN
Discharge: HOME | End: 2018-06-27
Attending: INTERNAL MEDICINE | Admitting: INTERNAL MEDICINE
Payer: MEDICARE

## 2018-06-27 ENCOUNTER — TELEPHONE (OUTPATIENT)
Dept: ONCOLOGY | Facility: CLINIC | Age: 78
End: 2018-06-27

## 2018-06-27 ENCOUNTER — ONCOLOGY VISIT (OUTPATIENT)
Dept: ONCOLOGY | Facility: CLINIC | Age: 78
End: 2018-06-27
Attending: INTERNAL MEDICINE
Payer: MEDICARE

## 2018-06-27 DIAGNOSIS — C91.10 CLL (CHRONIC LYMPHOCYTIC LEUKEMIA) (H): ICD-10-CM

## 2018-06-27 LAB
ANISOCYTOSIS BLD QL SMEAR: SLIGHT
BASOPHILS # BLD AUTO: 0 10E9/L (ref 0–0.2)
BASOPHILS NFR BLD AUTO: 0 %
DIFFERENTIAL METHOD BLD: ABNORMAL
EOSINOPHIL # BLD AUTO: 0.4 10E9/L (ref 0–0.7)
EOSINOPHIL NFR BLD AUTO: 0.5 %
ERYTHROCYTE [DISTWIDTH] IN BLOOD BY AUTOMATED COUNT: 16.3 % (ref 10–15)
HCT VFR BLD AUTO: 31.5 % (ref 40–53)
HGB BLD-MCNC: 9.9 G/DL (ref 13.3–17.7)
LYMPHOCYTES # BLD AUTO: 73.8 10E9/L (ref 0.8–5.3)
LYMPHOCYTES NFR BLD AUTO: 91.5 %
MACROCYTES BLD QL SMEAR: PRESENT
MCH RBC QN AUTO: 33.6 PG (ref 26.5–33)
MCHC RBC AUTO-ENTMCNC: 31.4 G/DL (ref 31.5–36.5)
MCV RBC AUTO: 107 FL (ref 78–100)
MONOCYTES # BLD AUTO: 3.6 10E9/L (ref 0–1.3)
MONOCYTES NFR BLD AUTO: 4.5 %
NEUTROPHILS # BLD AUTO: 2.8 10E9/L (ref 1.6–8.3)
NEUTROPHILS NFR BLD AUTO: 3.5 %
PLATELET # BLD AUTO: 124 10E9/L (ref 150–450)
PLATELET # BLD EST: ABNORMAL 10*3/UL
RBC # BLD AUTO: 2.95 10E12/L (ref 4.4–5.9)
WBC # BLD AUTO: 80.7 10E9/L (ref 4–11)

## 2018-06-27 PROCEDURE — 36415 COLL VENOUS BLD VENIPUNCTURE: CPT

## 2018-06-27 PROCEDURE — 85025 COMPLETE CBC W/AUTO DIFF WBC: CPT | Performed by: INTERNAL MEDICINE

## 2018-06-27 NOTE — LETTER
6/27/2018         RE: Austin Garza  [address] [facility] [address]        Dear Colleague,    Thank you for referring your patient, Austin Garza, to the [facility]ER [facility]ARE. Please see a copy of my visit note below.    Medical Assistant Note:  Austin Garza presents today for lab draw.    Patient seen by provider today: No.   present during visit today: Not Applicable.    Concerns: No Concerns.    Procedure:  Labs drawn: .    Post Assessment:  Labs drawn without difficulty: Yes.    Discharge Plan:  Departure Mode: Ambulatory.    Face to Face Time: 10.    Ita Pinto              Again, thank you for allowing me to participate in the care of your patient.        Sincerely,        [facility] Oncology Nurse

## 2018-06-27 NOTE — MR AVS SNAPSHOT
After Visit Summary   6/27/2018    Austin Garza    MRN: [id number]           Patient Information     Date Of Birth          1940        Visit Information        Provider Department      6/27/2018 10:30 AM  ONCOLOGY NURSE [facility]        Today's Diagnoses     CLL (chronic lymphocytic leukemia) (H)           Follow-ups after your visit        Your next 10 appointments already scheduled     Jul 17, 2018  9:15 AM CDT   CTA ANGIOGRAM ABDOMEN PELVIS with SHCT1   [facility] CT ([facility])    [address] [address] [phone]           Please bring any scans or X-rays taken at other hospitals, if similar tests were done. Also bring a list of your medicines, including vitamins, minerals and over-the-counter drugs. It is safest to leave personal items at home.  Be sure to tell your doctor:   If you have any allergies.   If there s any chance you are pregnant.   If you are breastfeeding.    If you have diabetes as your medication may need to be adjusted for this exam.  You will have contrast for this exam. To prepare:   Do not eat or drink for 2 hours before your exam. If you need to take medicine, you may take it with small sips of water. (We may ask you to take liquid medicine as well.)   The day before your exam, drink extra fluids at least six 8-ounce glasses (unless your doctor tells you to restrict your fluids).  Patients over 70 or patients with diabetes or kidney problems:   If you haven t had a blood test (creatinine test) within the last 30 days, the Cardiologist/Radiologist may require you to get this test prior to your exam.  Please wear loose clothing, such as a sweat suit or jogging clothes. Avoid snaps, zippers and other metal. We may ask you to undress and put on a hospital gown.  If you have any questions, please call the Imaging Department where you will have your exam.            Jul 17, 2018 10:45 AM CDT    Return Visit with Milton Cazares MD   [facility] ([facility])    [address]  [place] [address] [phone]            Jul 19, 2018  3:15 PM CDT   Return Visit with Breana Perry MD   [facility] ([facility])    [facility]  [address] [place] [address]  [place] [address] [phone]              Who to contact     If you have questions or need follow up information about today's clinic visit or your schedule please contact [facility] directly at [phone].  Normal or non-critical lab and imaging results will be communicated to you by Insiders S.A.hart, letter or phone within 4 business days after the clinic has received the results. If you do not hear from us within 7 days, please contact the clinic through Insiders S.A.hart or phone. If you have a critical or abnormal lab result, we will notify you by phone as soon as possible.  Submit refill requests through Video Passports or call your pharmacy and they will forward the refill request to us. Please allow 3 business days for your refill to be completed.          Additional Information About Your Visit        Insiders S.A.harMobile Posse Information     Video Passports gives you secure access to your electronic health record. If you see a primary care provider, you can also send messages to your care team and make appointments. If you have questions, please call your primary care clinic.  If you do not have a primary care provider, please call [phone] and they will assist you.        Care EveryWhere ID     This is your Care EveryWhere ID. This could be used by other organizations to access your [place] medical records  [id number]         Blood Pressure from Last 3 Encounters:   05/17/18 130/82   03/06/18 (!) 154/96   02/01/18 131/89    Weight from Last 3 Encounters:   05/17/18 97.1 kg (214 lb)   03/06/18 94.8 kg  (209 lb)   02/01/18 95.9 kg (211 lb 6.4 oz)              We Performed the Following     CBC with platelets differential        Primary Care Provider Office Phone # Fax #    Sandi Eastman [phone] [address] [place] [address]        Equal Access to Services     [facility]: Hadii aad ku hadasho Soomaali, waaxda luqadaha, qaybta kaalmada adeegyada, waxay idiin hayaan adeeg shell laLesleyjennifern ah. So [facility] [phone].    ATENCIÓN: Si habla español, tiene a edward disposición servicios gratuitos de asistencia lingüística. Llame al [phone].    We comply with applicable federal civil rights laws and [place] laws. We do not discriminate on the basis of race, color, national origin, age, disability, sex, sexual orientation, or gender identity.            Thank you!     Thank you for choosing [facility]  for your care. Our goal is always to provide you with excellent care. Hearing back from our patients is one way we can continue to improve our services. Please take a few minutes to complete the written survey that you may receive in the mail after your visit with us. Thank you!             Your Updated Medication List - Protect others around you: Learn how to safely use, store and throw away your medicines at www.disposemymeds.org.          This list is accurate as of 6/27/18 10:30 AM.  Always use your most recent med list.                   Brand Name Dispense Instructions for use Diagnosis    aspirin 325 MG tablet     90 tablet    Take 1 tablet (325 mg) by mouth every evening    Abdominal aortic aneurysm (AAA) without rupture (H)       blood glucose monitoring meter device kit     1 kit    Use to test blood sugars 2-3 times daily as directed. Brand per insurance formulary    Type II or unspecified type diabetes mellitus without mention of complication, not stated as uncontrolled       blood glucose monitoring test strip    ONETOUCH ULTRA    100 each     Test 2-3 times daily as directed, brand per insurance formulary.    Type 2 diabetes mellitus without complication, without long-term current use of insulin (H)       CENTRUM SILVER per tablet      Take 1 tablet by mouth daily        fluticasone 50 MCG/ACT spray    FLONASE    1 Bottle    Spray 2 sprays into both nostrils 2 times daily    Viral URI with cough       glimepiride 1 MG tablet    AMARYL    90 tablet    Take 1 tablet (1 mg) by mouth every morning (before breakfast)    Type 2 diabetes mellitus without complication, without long-term current use of insulin (H)       LIPITOR PO      Take 20 mg by mouth At Bedtime        losartan-hydrochlorothiazide 50-12.5 MG per tablet    HYZAAR    90 tablet    Take 1 tablet by mouth daily    Hypertension goal BP (blood pressure) < 140/90       metFORMIN 1000 MG tablet    GLUCOPHAGE    180 tablet    Take 1 tablet (1,000 mg) by mouth 2 times daily (with meals)    Type 2 diabetes mellitus without complication, without long-term current use of insulin (H)

## 2018-06-27 NOTE — TELEPHONE ENCOUNTER
Discussed cbc results with Shoaib [place] NP and will continue to monitor every 1-2 weeks.  Pt called with results and states he is doing okay.  Pt notes intermittent night sweats for the last year, denies weight loss, fevers. Pt states he did receive a my chart message from Dr Perry earlier this week and will continue with weekly cbc although pt will be out of town next week.  If he returns early, he will have labs drawn 7/6.  Pt aware of plan and no further questions at this time.    Shirin Marin, RN, BSN

## 2018-06-27 NOTE — TELEPHONE ENCOUNTER
Received a call from [facility] with critical lab value:    WBC = 80.7    Dr Perry is out of the office today; will route to Shoaib [place] NP for review.  Alysa Campos RN BSN

## 2018-07-09 ENCOUNTER — TELEPHONE (OUTPATIENT)
Dept: ONCOLOGY | Facility: CLINIC | Age: 78
End: 2018-07-09

## 2018-07-09 ENCOUNTER — ONCOLOGY VISIT (OUTPATIENT)
Dept: ONCOLOGY | Facility: CLINIC | Age: 78
End: 2018-07-09
Attending: INTERNAL MEDICINE
Payer: MEDICARE

## 2018-07-09 ENCOUNTER — HOSPITAL ENCOUNTER (OUTPATIENT)
Facility: CLINIC | Age: 78
Setting detail: SPECIMEN
Discharge: HOME | End: 2018-07-09
Attending: INTERNAL MEDICINE | Admitting: INTERNAL MEDICINE
Payer: MEDICARE

## 2018-07-09 DIAGNOSIS — C91.10 CLL (CHRONIC LYMPHOCYTIC LEUKEMIA) (H): ICD-10-CM

## 2018-07-09 LAB
ANISOCYTOSIS BLD QL SMEAR: SLIGHT
BASOPHILS # BLD AUTO: 0 10E9/L (ref 0–0.2)
BASOPHILS NFR BLD AUTO: 0 %
DIFFERENTIAL METHOD BLD: ABNORMAL
EOSINOPHIL # BLD AUTO: 0 10E9/L (ref 0–0.7)
EOSINOPHIL NFR BLD AUTO: 0 %
ERYTHROCYTE [DISTWIDTH] IN BLOOD BY AUTOMATED COUNT: 16.4 % (ref 10–15)
HCT VFR BLD AUTO: 32.7 % (ref 40–53)
HGB BLD-MCNC: 10.1 G/DL (ref 13.3–17.7)
LYMPHOCYTES # BLD AUTO: 67.2 10E9/L (ref 0.8–5.3)
LYMPHOCYTES NFR BLD AUTO: 93 %
MACROCYTES BLD QL SMEAR: PRESENT
MCH RBC QN AUTO: 33.9 PG (ref 26.5–33)
MCHC RBC AUTO-ENTMCNC: 30.9 G/DL (ref 31.5–36.5)
MCV RBC AUTO: 110 FL (ref 78–100)
MONOCYTES # BLD AUTO: 0.7 10E9/L (ref 0–1.3)
MONOCYTES NFR BLD AUTO: 1 %
NEUTROPHILS # BLD AUTO: 4.3 10E9/L (ref 1.6–8.3)
NEUTROPHILS NFR BLD AUTO: 6 %
PLATELET # BLD AUTO: 116 10E9/L (ref 150–450)
PLATELET # BLD EST: ABNORMAL 10*3/UL
RBC # BLD AUTO: 2.98 10E12/L (ref 4.4–5.9)
WBC # BLD AUTO: 72.3 10E9/L (ref 4–11)

## 2018-07-09 PROCEDURE — 36415 COLL VENOUS BLD VENIPUNCTURE: CPT

## 2018-07-09 PROCEDURE — 85025 COMPLETE CBC W/AUTO DIFF WBC: CPT | Performed by: INTERNAL MEDICINE

## 2018-07-09 NOTE — MR AVS SNAPSHOT
After Visit Summary   7/9/2018    Austin Garza    MRN: [id number]           Patient Information     Date Of Birth          1940        Visit Information        Provider Department      7/9/2018 1:30 PM  ONCOLOGY NURSE [facility]        Today's Diagnoses     CLL (chronic lymphocytic leukemia) (H)           Follow-ups after your visit        Your next 10 appointments already scheduled     Jul 17, 2018  9:15 AM CDT   CTA ANGIOGRAM ABDOMEN PELVIS with SHCT1   [facility] CT ([facility])    [address] [facility] [address] [phone]           Please bring any scans or X-rays taken at other hospitals, if similar tests were done. Also bring a list of your medicines, including vitamins, minerals and over-the-counter drugs. It is safest to leave personal items at home.  Be sure to tell your doctor:   If you have any allergies.   If there s any chance you are pregnant.   If you are breastfeeding.    If you have diabetes as your medication may need to be adjusted for this exam.  You will have contrast for this exam. To prepare:   Do not eat or drink for 2 hours before your exam. If you need to take medicine, you may take it with small sips of water. (We may ask you to take liquid medicine as well.)   The day before your exam, drink extra fluids at least six 8-ounce glasses (unless your doctor tells you to restrict your fluids).  Patients over 70 or patients with diabetes or kidney problems:   If you haven t had a blood test (creatinine test) within the last 30 days, the Cardiologist/Radiologist may require you to get this test prior to your exam.  Please wear loose clothing, such as a sweat suit or jogging clothes. Avoid snaps, zippers and other metal. We may ask you to undress and put on a hospital gown.  If you have any questions, please call the Imaging Department where you will have your exam.            Jul 17, 2018 10:45 AM CDT    Return Visit with Milton Cazares MD   [facility] ([facility])    [address]  [place] [address] [phone]            Jul 19, 2018  3:15 PM CDT   Return Visit with Breana Perry MD   [facility] ([facility])    [facility]  [address]  [place] [address] [phone]              Who to contact     If you have questions or need follow up information about today's clinic visit or your schedule please contact [facility] C[facility] directly at [phone].  Normal or non-critical lab and imaging results will be communicated to you by Best Teacherhart, letter or phone within 4 business days after the clinic has received the results. If you do not hear from us within 7 days, please contact the clinic through Best Teacherhart or phone. If you have a critical or abnormal lab result, we will notify you by phone as soon as possible.  Submit refill requests through Winkapp or call your pharmacy and they will forward the refill request to us. Please allow 3 business days for your refill to be completed.          Additional Information About Your Visit        Best TeacherharTango Information     Winkapp gives you secure access to your electronic health record. If you see a primary care provider, you can also send messages to your care team and make appointments. If you have questions, please call your primary care clinic.  If you do not have a primary care provider, please call [phone] and they will assist you.        Care EveryWhere ID     This is your Care EveryWhere ID. This could be used by other organizations to access your [place] medical records  [id number]         Blood Pressure from Last 3 Encounters:   05/17/18 130/82   03/06/18 (!) 154/96   02/01/18 131/89    Weight from Last 3 Encounters:   05/17/18 97.1 kg (214 lb)   03/06/18 94.8 kg  (209 lb)   02/01/18 95.9 kg (211 lb 6.4 oz)              We Performed the Following     CBC with platelets differential        Primary Care Provider Office Phone # Fax #    Sandi Eastman [phone] [address] [place] [address]        Equal Access to Services     [facility]: Hadii aad ku hadasho Soomaali, waaxda luqadaha, qaybta kaalmada adeegyada, waxay idiin hayaan adeeg shell laLesleyjennifern ah. So [facility] [phone].    ATENCIÓN: Si habla español, tiene a edward disposición servicios gratuitos de asistencia lingüística. Llame al [phone].    We comply with applicable federal civil rights laws and [place] laws. We do not discriminate on the basis of race, color, national origin, age, disability, sex, sexual orientation, or gender identity.            Thank you!     Thank you for choosing [facility]CER [facility]  for your care. Our goal is always to provide you with excellent care. Hearing back from our patients is one way we can continue to improve our services. Please take a few minutes to complete the written survey that you may receive in the mail after your visit with us. Thank you!             Your Updated Medication List - Protect others around you: Learn how to safely use, store and throw away your medicines at www.disposemymeds.org.          This list is accurate as of 7/9/18  1:33 PM.  Always use your most recent med list.                   Brand Name Dispense Instructions for use Diagnosis    aspirin 325 MG tablet     90 tablet    Take 1 tablet (325 mg) by mouth every evening    Abdominal aortic aneurysm (AAA) without rupture (H)       blood glucose monitoring meter device kit     1 kit    Use to test blood sugars 2-3 times daily as directed. Brand per insurance formulary    Type II or unspecified type diabetes mellitus without mention of complication, not stated as uncontrolled       blood glucose monitoring test strip    ONETOUCH ULTRA    100 each     Test 2-3 times daily as directed, brand per insurance formulary.    Type 2 diabetes mellitus without complication, without long-term current use of insulin (H)       CENTRUM SILVER per tablet      Take 1 tablet by mouth daily        fluticasone 50 MCG/ACT spray    FLONASE    1 Bottle    Spray 2 sprays into both nostrils 2 times daily    Viral URI with cough       glimepiride 1 MG tablet    AMARYL    90 tablet    Take 1 tablet (1 mg) by mouth every morning (before breakfast)    Type 2 diabetes mellitus without complication, without long-term current use of insulin (H)       LIPITOR PO      Take 20 mg by mouth At Bedtime        losartan-hydrochlorothiazide 50-12.5 MG per tablet    HYZAAR    90 tablet    Take 1 tablet by mouth daily    Hypertension goal BP (blood pressure) < 140/90       metFORMIN 1000 MG tablet    GLUCOPHAGE    180 tablet    Take 1 tablet (1,000 mg) by mouth 2 times daily (with meals)    Type 2 diabetes mellitus without complication, without long-term current use of insulin (H)

## 2018-07-09 NOTE — LETTER
7/9/2018         RE: Austin Garza  [address] [address]        Dear Colleague,    Thank you for referring your patient, Austin Garza, to the [facility] CANCER CARE. Please see a copy of my visit note below.    Medical Assistant Note:  Austin Garza presents today for lab draw.    Patient seen by provider today: No.   present during visit today: Not Applicable.    Concerns: No Concerns.    Procedure:  Labs drawn: .    Post Assessment:  Labs drawn without difficulty: Yes.      Face to Face Time: 10.    Ita Pinto              Again, thank you for allowing me to participate in the care of your patient.        Sincerely,        [facility] Oncology Nurse

## 2018-07-09 NOTE — TELEPHONE ENCOUNTER
Noted.  WBC trended down some from last week.  I am seeing patient in clinic next week to discuss options for treatment.

## 2018-07-09 NOTE — PROGRESS NOTES
Medical Assistant Note:  Austin Garza presents today for lab draw.    Patient seen by provider today: No.   present during visit today: Not Applicable.    Concerns: No Concerns.    Procedure:  Labs drawn: .    Post Assessment:  Labs drawn without difficulty: Yes.      Face to Face Time: 10.    Ita Pinto

## 2018-07-09 NOTE — TELEPHONE ENCOUNTER
Received a call from [facility] with critical lab value:    WBC = 72.3    Will route message to Dr Perry for review.  Alysa Campos RN, BSN, OCN

## 2018-07-10 NOTE — TELEPHONE ENCOUNTER
Talked with pt's wife.  She states that pt has already seen his results on [employer] Place Holdingshart account. Pt's wife states that pt will follow-up next week at his scheduled appt.   Alysa Campos RN, BSN, OCN

## 2018-07-17 ENCOUNTER — OFFICE VISIT (OUTPATIENT)
Dept: OTHER | Facility: CLINIC | Age: 78
End: 2018-07-17
Attending: SURGERY
Payer: MEDICARE

## 2018-07-17 ENCOUNTER — HOSPITAL ENCOUNTER (OUTPATIENT)
Dept: CT IMAGING | Facility: CLINIC | Age: 78
Discharge: HOME | End: 2018-07-17
Attending: SURGERY | Admitting: SURGERY
Payer: MEDICARE

## 2018-07-17 VITALS — HEART RATE: 84 BPM | DIASTOLIC BLOOD PRESSURE: 82 MMHG | SYSTOLIC BLOOD PRESSURE: 133 MMHG

## 2018-07-17 DIAGNOSIS — I71.40 ABDOMINAL AORTIC ANEURYSM (H): ICD-10-CM

## 2018-07-17 DIAGNOSIS — I71.40 ABDOMINAL AORTIC ANEURYSM (AAA) WITHOUT RUPTURE (H): Primary | ICD-10-CM

## 2018-07-17 LAB
CREAT BLD-MCNC: 1.4 MG/DL (ref 0.66–1.25)
GFR SERPL CREATININE-BSD FRML MDRD: 49 ML/MIN/1.7M2

## 2018-07-17 PROCEDURE — G0463 HOSPITAL OUTPT CLINIC VISIT: HCPCS

## 2018-07-17 PROCEDURE — 25000125 ZZHC RX 250: Performed by: SURGERY

## 2018-07-17 PROCEDURE — 82565 ASSAY OF CREATININE: CPT

## 2018-07-17 PROCEDURE — 25000128 H RX IP 250 OP 636: Performed by: SURGERY

## 2018-07-17 PROCEDURE — 99213 OFFICE O/P EST LOW 20 MIN: CPT | Mod: ZP | Performed by: SURGERY

## 2018-07-17 PROCEDURE — 74174 CTA ABD&PLVS W/CONTRAST: CPT

## 2018-07-17 RX ORDER — IOPAMIDOL 755 MG/ML
80 INJECTION, SOLUTION INTRAVASCULAR ONCE
Status: COMPLETED | OUTPATIENT
Start: 2018-07-17 | End: 2018-07-17

## 2018-07-17 RX ADMIN — IOPAMIDOL 80 ML: 755 INJECTION, SOLUTION INTRAVENOUS at 09:42

## 2018-07-17 RX ADMIN — SODIUM CHLORIDE, PRESERVATIVE FREE 80 ML: 5 INJECTION INTRAVENOUS at 09:42

## 2018-07-17 NOTE — NURSING NOTE
"Austin Garza is a 77 year old male who presents for:  Chief Complaint   Patient presents with     RECHECK     History of EVAR 12/4/17 ; 6 month follow up to 1/16/18 appointment with         Vitals:    Vitals:    07/17/18 1109   BP: 133/82   BP Location: Left arm   Patient Position: Chair   Cuff Size: Adult Large   Pulse: 84       BMI:  Estimated body mass index is 32.54 kg/(m^2) as calculated from the following:    Height as of 5/17/18: 5' 8\" (1.727 m).    Weight as of 5/17/18: 214 lb (97.1 kg).    Pain Score:  Data Unavailable        Kimberley Luna      "

## 2018-07-17 NOTE — PROGRESS NOTES
Vascular Surgery Clinic Note    Austin is here for his 6 month visit with no acute issues.  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  The aneurysm sac appears slightly smaller in size on measurement today.  He is otherwise asymptomatic and no pain at all.      Physical Examination:  /82 (BP Location: Left arm, Patient Position: Chair, Cuff Size: Adult Large)  Pulse 84  Constitutional:  NAD, alert and appropriate, well developed male.  HEENT: PERRLA, mucous membranes moist.  Abd: soft, NT, no masses and non-pulsatile.  Ext:  Warm and well perfused.  Motor intact and sensory.     Assessment: 76 yo male s/p EVAR 12/2017 with a delayed Type II endoleak.     Plan:  I will see Austin back in 6 months for evaluation and CT angiogram at that time.  He will call sooner if he has pain or discomfort.  He does have a type II endoleak and since the aneurysm sac is not growing I think we can continue to just observe the leak and no intervention at this time.  Overall, he is back to his activities and doing very well.  I spent 15 minutes of face-to-face time, > 50% spent counseling and coordinating care.     Milton Cazares MD  Vascular Surgery

## 2018-07-17 NOTE — MR AVS SNAPSHOT
After Visit Summary   7/17/2018    Austin Garza    MRN: [id number]           Patient Information     Date Of Birth          1940        Visit Information        Provider Department      7/17/2018 10:45 AM Milton Cazares MD [facility] at  Vascular Center      Today's Diagnoses     Abdominal aortic aneurysm (AAA) without rupture (H)    -  1       Follow-ups after your visit        Follow-up notes from your care team     Return in about 6 months (around 1/17/2019).      Your next 10 appointments already scheduled     Jul 19, 2018  3:15 PM CDT   Return Visit with Breana Perry MD   [facility] ([facility])    [facility]  [address] [place] [address] [phone]              Who to contact     If you have questions or need follow up information about today's clinic visit or your schedule please contact [facility] directly at [phone].  Normal or non-critical lab and imaging results will be communicated to you by MyChart, letter or phone within 4 business days after the clinic has received the results. If you do not hear from us within 7 days, please contact the clinic through [email or web address] or phone. If you have a critical or abnormal lab result, we will notify you by phone as soon as possible.  Submit refill requests through Conzoom or call your pharmacy and they will forward the refill request to us. Please allow 3 business days for your refill to be completed.          Additional Information About Your Visit        MyChart Information     Conzoom gives you secure access to your electronic health record. If you see a primary care provider, you can also send messages to your care team and make appointments. If you have questions, please call your primary care clinic.  If you do not have a primary care provider, please call  [phone] and they will assist you.        Care EveryWhere ID     This is your Care EveryWhere ID. This could be used by other organizations to access your [place] medical records  [id number]        Your Vitals Were     Pulse                   84            Blood Pressure from Last 3 Encounters:   07/17/18 133/82   05/17/18 130/82   03/06/18 (!) 154/96    Weight from Last 3 Encounters:   05/17/18 97.1 kg (214 lb)   03/06/18 94.8 kg (209 lb)   02/01/18 95.9 kg (211 lb 6.4 oz)              Today, you had the following     No orders found for display       Primary Care Provider Office Phone # Fax #    Sandi Eastman [phone] [address] [place] [address]        Equal Access to Services     [facility]: Hadii gertrude simon hadasho Soomaali, waaxda luqadaha, qaybta kaalmada adesheriyada, mina bennett . So [facility] [phone].    ATENCIÓN: Si habla español, tiene a edward disposición servicios gratuitos de asistencia lingüística. Anika al [phone].    We comply with applicable federal civil rights laws and [place] laws. We do not discriminate on the basis of race, color, national origin, age, disability, sex, sexual orientation, or gender identity.            Thank you!     Thank you for choosing [facility] VASCULAR [place]  for your care. Our goal is always to provide you with excellent care. Hearing back from our patients is one way we can continue to improve our services. Please take a few minutes to complete the written survey that you may receive in the mail after your visit with us. Thank you!             Your Updated Medication List - Protect others around you: Learn how to safely use, store and throw away your medicines at www.disposemymeds.org.          This list is accurate as of 7/17/18 12:00 PM.  Always use your most recent med list.                   Brand Name Dispense Instructions for use Diagnosis    aspirin 325 MG tablet     90  tablet    Take 1 tablet (325 mg) by mouth every evening    Abdominal aortic aneurysm (AAA) without rupture (H)       blood glucose monitoring meter device kit     1 kit    Use to test blood sugars 2-3 times daily as directed. Brand per insurance formulary    Type II or unspecified type diabetes mellitus without mention of complication, not stated as uncontrolled       blood glucose monitoring test strip    ONETOUCH ULTRA    100 each    Test 2-3 times daily as directed, brand per insurance formulary.    Type 2 diabetes mellitus without complication, without long-term current use of insulin (H)       CENTRUM SILVER per tablet      Take 1 tablet by mouth daily        fluticasone 50 MCG/ACT spray    FLONASE    1 Bottle    Spray 2 sprays into both nostrils 2 times daily    Viral URI with cough       glimepiride 1 MG tablet    AMARYL    90 tablet    Take 1 tablet (1 mg) by mouth every morning (before breakfast)    Type 2 diabetes mellitus without complication, without long-term current use of insulin (H)       LIPITOR PO      Take 20 mg by mouth At Bedtime        losartan-hydrochlorothiazide 50-12.5 MG per tablet    HYZAAR    90 tablet    Take 1 tablet by mouth daily    Hypertension goal BP (blood pressure) < 140/90       metFORMIN 1000 MG tablet    GLUCOPHAGE    180 tablet    Take 1 tablet (1,000 mg) by mouth 2 times daily (with meals)    Type 2 diabetes mellitus without complication, without long-term current use of insulin (H)

## 2018-07-19 ENCOUNTER — HOSPITAL ENCOUNTER (OUTPATIENT)
Facility: CLINIC | Age: 78
Setting detail: SPECIMEN
Discharge: HOME | End: 2018-07-19
Attending: INTERNAL MEDICINE | Admitting: INTERNAL MEDICINE
Payer: MEDICARE

## 2018-07-19 ENCOUNTER — ONCOLOGY VISIT (OUTPATIENT)
Dept: ONCOLOGY | Facility: CLINIC | Age: 78
End: 2018-07-19
Attending: INTERNAL MEDICINE
Payer: MEDICARE

## 2018-07-19 ENCOUNTER — TELEPHONE (OUTPATIENT)
Dept: ONCOLOGY | Facility: CLINIC | Age: 78
End: 2018-07-19

## 2018-07-19 VITALS
RESPIRATION RATE: 16 BRPM | BODY MASS INDEX: 32.74 KG/M2 | OXYGEN SATURATION: 98 % | HEART RATE: 75 BPM | SYSTOLIC BLOOD PRESSURE: 133 MMHG | WEIGHT: 216 LBS | DIASTOLIC BLOOD PRESSURE: 78 MMHG | TEMPERATURE: 97.5 F | HEIGHT: 68 IN

## 2018-07-19 DIAGNOSIS — D72.829 LEUKOCYTOSIS, UNSPECIFIED TYPE: ICD-10-CM

## 2018-07-19 DIAGNOSIS — C91.10 CLL (CHRONIC LYMPHOCYTIC LEUKEMIA) (H): ICD-10-CM

## 2018-07-19 LAB
ACANTHOCYTES BLD QL SMEAR: SLIGHT
ANISOCYTOSIS BLD QL SMEAR: SLIGHT
BASOPHILS # BLD AUTO: 0 10E9/L (ref 0–0.2)
BASOPHILS NFR BLD AUTO: 0 %
DACRYOCYTES BLD QL SMEAR: SLIGHT
DIFFERENTIAL METHOD BLD: ABNORMAL
EOSINOPHIL # BLD AUTO: 0 10E9/L (ref 0–0.7)
EOSINOPHIL NFR BLD AUTO: 0 %
ERYTHROCYTE [DISTWIDTH] IN BLOOD BY AUTOMATED COUNT: 16.2 % (ref 10–15)
HCT VFR BLD AUTO: 30.7 % (ref 40–53)
HGB BLD-MCNC: 9.7 G/DL (ref 13.3–17.7)
LYMPHOCYTES # BLD AUTO: 53.2 10E9/L (ref 0.8–5.3)
LYMPHOCYTES NFR BLD AUTO: 87 %
MCH RBC QN AUTO: 35.1 PG (ref 26.5–33)
MCHC RBC AUTO-ENTMCNC: 31.6 G/DL (ref 31.5–36.5)
MCV RBC AUTO: 111 FL (ref 78–100)
METAMYELOCYTES # BLD: 0.6 10E9/L
METAMYELOCYTES NFR BLD MANUAL: 1 %
MONOCYTES # BLD AUTO: 3.7 10E9/L (ref 0–1.3)
MONOCYTES NFR BLD AUTO: 6 %
NEUTROPHILS # BLD AUTO: 3.7 10E9/L (ref 1.6–8.3)
NEUTROPHILS NFR BLD AUTO: 6 %
PLATELET # BLD AUTO: 113 10E9/L (ref 150–450)
PLATELET # BLD EST: ABNORMAL 10*3/UL
RBC # BLD AUTO: 2.76 10E12/L (ref 4.4–5.9)
WBC # BLD AUTO: 61.2 10E9/L (ref 4–11)

## 2018-07-19 PROCEDURE — 88185 FLOWCYTOMETRY/TC ADD-ON: CPT | Performed by: INTERNAL MEDICINE

## 2018-07-19 PROCEDURE — G0463 HOSPITAL OUTPT CLINIC VISIT: HCPCS

## 2018-07-19 PROCEDURE — 40001004 ZZHCL STATISTIC FLOW INT 9-15 ABY TC 88188: Performed by: INTERNAL MEDICINE

## 2018-07-19 PROCEDURE — 88184 FLOWCYTOMETRY/ TC 1 MARKER: CPT | Performed by: INTERNAL MEDICINE

## 2018-07-19 PROCEDURE — 85025 COMPLETE CBC W/AUTO DIFF WBC: CPT | Performed by: INTERNAL MEDICINE

## 2018-07-19 PROCEDURE — 99214 OFFICE O/P EST MOD 30 MIN: CPT | Performed by: INTERNAL MEDICINE

## 2018-07-19 PROCEDURE — 36415 COLL VENOUS BLD VENIPUNCTURE: CPT

## 2018-07-19 ASSESSMENT — PAIN SCALES - GENERAL: PAINLEVEL: NO PAIN (0)

## 2018-07-19 NOTE — LETTER
7/19/2018         RE: Austin Garza  [address] [address]        Dear Colleague,    Thank you for referring your patient, Austin Garza, to the [facility]. Please see a copy of my visit note below.    [facility]    Hematology/Oncology Established Patient Note      Today's Date: 7/19/18    Reason for Follow-up: CLL      HISTORY OF PRESENT ILLNESS: Austin Garza is a 77 year old male with PMHx of DMII, HTN who presented with leukocytosis.  In November 2017, he was found to have elevated WBC of 61.7K, hemoglobin of 10.4, and platelet of 115K.  There were no other CBC results available between 2010 and 2017.  Prior to 2010, he had normal CBC, with normal to mild anemia, and mild thrombocytopenia.   He was asymptomatic.    He underwent bone marrow biopsy on 11/21/17, which was consistent with chronic lymphocytic leukemia/small lymphocytic lymphoma, with 13% ringed sideroblasts identified.  The presence of increased numbers of ringed sideroblasts raises the possibility of an associated myelodysplastic syndrome.  Dysplastic changes are not identified though.  Many cases exhibiting ringed sideroblasts are metabolic origin, without significant risk of progression to acute leukemia, and these typically do not show dysplastic morphology as is the case with this specimen.  15% ringed sideroblasts are required for a diagnosis for RARS, which this specimen does not meet.  Flow cytometry is also consistent with CLL/SLL.  Cytogenetics show two (10%) of the metaphase cells comprise a clone characterized by a deletion within the proximal long arm of a chromosome 13 as the sole karyotypic abnormality.  Three (15%) metaphases had loss of the Y chromosome as the sole abnormality.    CT scan on 11/29/17 shows mildly enlarged left axillary lymph node and periaortic lymph nodes in the retroperitoneum of the abdomen.  Spleen is also enlarged.    On his  staging CT scan for CLL, he was incidentally found to have a large infrarenal abdominal aortic aneurysm, measuring 7.6 cm.  He was seen by Dr. Thomas, and he underwent EVAR on 12/4/17.       INTERIM HISTORY: Austin is here for follow-up today. He has been doing well.  He goes up to his boat on [facility] [address] every weekend in the summer.  He notes fatigue and mild night sweats, but unchanged over the last 6 months.  He denies new lumps/bumps.      REVIEW OF SYSTEMS:   14 point ROS was reviewed and is negative other than as noted above in HPI.       HOME MEDICATIONS:  Current Outpatient Prescriptions   Medication Sig Dispense Refill     aspirin 325 MG tablet Take 1 tablet (325 mg) by mouth every evening 90 tablet 3     Atorvastatin Calcium (LIPITOR PO) Take 20 mg by mouth At Bedtime       blood glucose monitoring (ONE TOUCH ULTRA) test strip Test 2-3 times daily as directed, brand per insurance formulary. 100 each 0     Blood Glucose Monitoring Suppl (ONE TOUCH ULTRA 2) W/DEVICE KIT Use to test blood sugars 2-3 times daily as directed. Brand per insurance formulary   1 kit 0     fluticasone (FLONASE) 50 MCG/ACT spray Spray 2 sprays into both nostrils 2 times daily 1 Bottle 11     glimepiride (AMARYL) 1 MG tablet Take 1 tablet (1 mg) by mouth every morning (before breakfast) 90 tablet 3     losartan-hydrochlorothiazide (HYZAAR) 50-12.5 MG per tablet Take 1 tablet by mouth daily 90 tablet 3     metFORMIN (GLUCOPHAGE) 1000 MG tablet Take 1 tablet (1,000 mg) by mouth 2 times daily (with meals) 180 tablet 3     Multiple Vitamins-Minerals (CENTRUM SILVER) per tablet Take 1 tablet by mouth daily           ALLERGIES:  Allergies   Allergen Reactions     Ace Inhibitors      cough         PAST MEDICAL HISTORY:  Past Medical History:   Diagnosis Date     Diaphragmatic hernia without mention of obstruction or gangrene      Diverticulitis of colon (without mention of hemorrhage)(562.11)      Esophageal reflux      Irritable bowel  syndrome      Macular degeneration (senile) of retina, unspecified      Squamous Cell Carcinoma, Back      Type II or unspecified type diabetes mellitus without mention of complication, not stated as uncontrolled      Unspecified essential hypertension          PAST SURGICAL HISTORY:  Past Surgical History:   Procedure Laterality Date     APPENDECTOMY       BONE MARROW BIOPSY, BONE SPECIMEN, NEEDLE/TROCAR N/A 2017    Procedure: BIOPSY BONE MARROW;  BONE MARROW BIOPSY;  Surgeon: Shady Garcia MD;  Location:  GI     COLONOSCOPY       ENDOVASCULAR REPAIR ANEURYSM ABDOMINAL AORTA N/A 2017    Procedure: ENDOVASCULAR REPAIR ANEURYSM ABDOMINAL AORTA;  ENDOVASCULAR REPAIR ANEURYSM ABDOMINAL AORTA;  Surgeon: Milton Cazares MD;  Location:  OR     SURGICAL HISTORY OF -       Squamous cell skin cancer resection - back     SURGICAL HISTORY OF -       skin tags resection     SURGICAL HISTORY OF -   2001    stress thall. normal     SURGICAL HISTORY OF -   2002    colonoscopy     SURGICAL HISTORY OF -       Fistulotomy with marsupialization for repair of fisture in ano         SOCIAL HISTORY:  Social History     Social History     Marital status:      Spouse name: librado     Number of children: 0     Years of education: 17     Occupational History     retired      Social History Main Topics     Smoking status: Former Smoker     Packs/day: 0.50     Years: 20.00     Quit date: 1975     Smokeless tobacco: Former User     Alcohol use 6.0 - 8.4 oz/week     10 - 14 Standard drinks or equivalent per week      Comment: 2 drinks a week     Drug use: No     Sexual activity: No     Other Topics Concern     Not on file     Social History Narrative   He quit smoking in .  He drinks 1-3 glasses of wine a night with dinner.  He is retired, and used to work as a .  He lives with his wife in [place].  His cousin had lung cancer and  around age 69.  Otherwise, he  "denies family history of cancer.        FAMILY HISTORY:  Family History   Problem Relation Age of Onset     Cerebrovascular Disease Father      x 2     Hypertension Mother      C.A.D. Mother      Cancer Mother      skin     Eye Disorder Mother      glaucoma     Prostate Cancer No family hx of      Cancer - colorectal No family hx of          PHYSICAL EXAM:  Vital signs:  /78  Pulse 75  Temp 97.5  F (36.4  C) (Tympanic)  Resp 16  Ht 1.727 m (5' 8\")  Wt 98 kg (216 lb)  SpO2 98%  BMI 32.84 kg/m2   GENERAL/CONSTITUTIONAL: No acute distress.  EYES: No scleral icterus.  MUSCULOSKELETAL: Warm and well-perfused.  NEUROLOGIC: Alert, oriented, answers questions appropriately.  INTEGUMENTARY: No jaundice.      LABS:  CBC RESULTS:   Recent Labs   Lab Test  07/19/18   1440   WBC  61.2*   RBC  2.76*   HGB  9.7*   HCT  30.7*   MCV  111*   MCH  35.1*   MCHC  31.6   RDW  16.2*   PLT  113*       PATHOLOGY:  Bone marrow biopsy 11/21/17:  FINAL DIAGNOSIS:   Peripheral blood demonstrating macrocytic anemia with thrombocytopenia     Bone marrow trephine biopsy and aspirate demonstrating hypercellular   bone marrow involved by chronic lymphocytic leukemia/small lymphocytic   lymphoma, 13% ringed sideroblasts identified (see comment)     COMMENT:   Immunophenotyping studies demonstrate a kappa monotypic CD5 positive   B-cell population most compatible with chronic lymphocytic leukemia.  An   immunoperoxidase stain for cyclin D1 is pending.  Dysplastic changes are   not identified within the erythroid series.  However, the presence of   increased numbers of ringed sideroblasts raises the possibility of an   associated myelodysplastic syndrome, particularly in light of the noted   increased mean red cell volume and associated thrombocytopenia.  The   thrombocytopenia may also reflect reduced numbers of megakaryocytes   given the involvement of marrow by chronic lymphocytic leukemia.  Many   cases exhibiting ringed sideroblasts " are metabolic in origin, without   significant risk of progression to acute leukemia, and these typically   do not show dysplastic morphology as is the case with the current   specimen.  Moreover, 15% ringed sideroblasts are required for a   diagnosis of  RARS. Cytogenetic studies are pending.     Flow  INTERPRETATION:   Bone Marrow, Left:        CD5 positive Kappa monotypic B cells (82%)     COMMENT:   The clonal B-cell population present in this specimen has a similar   immunophenotype as reported previously in the blood from this patient   ([id number]).  The immunophenotypic findings are suggestive of marrow   involvement by small lymphocytic lymphoma/chronic lymphocytic leukemia   (SLL/CLL). Large cells may not survive specimen processing.  There may   be peripheral blood contamination. Final interpretation requires   correlation with results of other ancillary studies, morphologic and   clinical features.     ISCN:   46,XY,del(13)(q12q14)[2]/45,X,-Y[3]/46,XY[15].nuc   alton(ATMx2,TP53x1)[4/200],(E63D955w5,EDAL8q9)[21/200]     INTERPRETATION:   Two (10%) of the metaphase cells analyzed comprised a clone   characterized by a deletion within the proximal long arm of a chromosome   13 as the sole karyotypic abnormality. Additionally, three (15%)   metaphases had loss of the Y chromosome as the sole abnormality.     These findings confirm and expand those of the previously reported FISH   analysis ([id number]) that showed 10.5% of interphase cells to have a   signal pattern indicative of loss of the [id number] locus.     Loss of the Y chromosome, as seen in this case, has been reported both   as an acquired clonal abnormality associated with hematologic (primarily   myeloid) disorders and also as a clinically insignificant finding   associated with the normal aging process in adult males. When presenting   as a clonal abnormality, loss of Y is typically seen in addition to   other cytogenetic abnormalities. This patient's  age and the absence of   other chromosomal abnormalities suggest that the loss of Y represents an   age-related finding in this case.       IMAGING:  CT c/a/p 11/29/17:  FINDINGS:   Chest: Possible minor fissure lymph node on series 3, image 45 along  the right minor fissure measuring 0.3 cm. Anterior right upper lobe  nodule measures 0.3 cm on image 40. A lateral right lower lobe nodule  measures 0.4 cm on image 47. Bibasilar atelectasis is noted. No  infiltrate or consolidation. No pleural or pericardial fluid. Heart is  normal in size. Esophagus is unremarkable. A few small mediastinal and  hilar lymph nodes are present, but none exceed size criteria for  abnormality. Bilateral axillary lymph nodes are also present. The  largest is in the left axilla measuring 1.7 x 1.1 cm on series 2,  image 18. Coronary artery calcification is present. Aorta demonstrates  scattered calcified plaque without aneurysm.     Abdomen: A large infrarenal abdominal aortic aneurysm is noted  measuring 7.6 x 6.7 cm on series 2, image 80 and contains a moderate  amount of mural thrombus. There is slight haziness around the margin  of the aneurysm sac of uncertain significance as a few scattered lymph  nodes are noted in this area. Periaortic inflammation is possible. No  obvious retroperitoneal hemorrhage at this time. A periaortic node on  series 2, image 76 near the superior aspect of the aneurysm measures  1.9 x 1.4 cm. Aneurysm does not extend down into the common iliac  arteries. Probable calcified gallstone in the gallbladder on series 2,  image 69. Gallbladder is otherwise within normal limits. The liver,  pancreas, adrenal glands and kidneys are unremarkable. No  hydronephrosis or renal calculi. Spleen is prominent in size without  focal mass measuring 15.7 cm in AP dimension. Tiny cyst posterior  spleen is noted on series 2, image 64. No evidence of bowel  obstruction or diverticulitis.     Pelvis: The bladder and rectum are  unremarkable. No enlarged pelvic  lymph nodes or free fluid. Bone window examination demonstrates  degenerative spine changes.         IMPRESSION:  1. Mildly enlarged left axillary lymph node and periaortic lymph nodes  in the retroperitoneum of the abdomen. Spleen is also enlarged.  Findings would be consistent with patient's underlying diagnosis of  CLL. Follow up as clinically warranted.  2. Infrarenal abdominal aortic aneurysm measuring up to 7.6 cm in  diameter. Mild indistinctness of the aneurysm wall could be due to  inflammation. No retroperitoneal hemorrhage is currently evident.  Recommend followup with vascular surgery or interventional radiology  for further assessment.    CT angiogram 7/17/18:  1. Stable endograft. Persistent type II endoleak' s as above. Aneurysm  sac size has decreased when compared to the previous exam.  2. Mild splenomegaly.  3. Colonic diverticulosis.  4. Gallstone.      ASSESSMENT/PLAN:  Austin Garza is a 77 year old male with:    1) CLL/SLL: BLACKWOOD stage III, with lymphocytosis, lymphadenopathy, splenomegaly, and anemia.  He has mild thrombocytopenia as well, but is above 100K.  He presented with leukocytosis with WBC of 61.7K, hemoglobin of 10.4, and platelet count of 115K on diagnosis.  Bone marrow biopsy and flow cytometry confirmed CLL/SLL.  CT scan shows mildly enlarged left axillary lymph node and periaortic lymph nodes in the retroperitoneum of the abdomen, with enlarged spleen.      CBC from today reviewed with Austin.  WBC trended back down to 61.2K, which was around the level that it was when he was diagnosed with CLL in November 2017.    He has symptoms of fatigue and sweats around his neck at night.  Elevated lymphocyte count is not necessarily an indication to start treatment.  However, I discussed that if these symptoms are thought to be related to the CLL and if his lymphocyte count continues to rise rapidly or if his cytopenias worsen, this may warrant starting  treatment.  He says that he would be okay with that if it is needed.     Since WBC is fairly stable at his baseline, hemoglobin and platelet count are also stable, and his symptoms have not changed, will continue to monitor closely for now.    -RTC in 1 month with repeat labs.    2) Abdominal aortic aneurysm: measures up to 7.6 cm on CT scan, incidentally found.  He underwent EVAR on 12/4/17.  -follow-up with vascular surgery; he is to follow-up with repeat CTA around January 2019    3) Diabetes, hypertension:  -following with PCP      I spent a total of 25 minutes with the patient, with over >50% of the time in counseling and/or coordination of care.      Breana Perry MD  Hematology/Oncology  [facility]      Again, thank you for allowing me to participate in the care of your patient.        Sincerely,        Breana Perry MD

## 2018-07-19 NOTE — PATIENT INSTRUCTIONS
-return to clinic in 1 month with labs a day prior-Scheduled for 8/22/18 for Lab and 8/23/18 w/ Dr. Perry. Cristin RAYMUNDO  AVS printed and given to Donna RAYMUNDO

## 2018-07-19 NOTE — NURSING NOTE
"Oncology Rooming Note    July 19, 2018 3:12 PM   Austin Garza is a 77 year old male who presents for:    Chief Complaint   Patient presents with     Oncology Clinic Visit     CLL (chronic lymphocytic leukemia)     Initial Vitals: /78  Pulse 75  Temp 97.5  F (36.4  C) (Tympanic)  Resp 16  Ht 1.727 m (5' 8\")  Wt 98 kg (216 lb)  SpO2 98%  BMI 32.84 kg/m2 Estimated body mass index is 32.84 kg/(m^2) as calculated from the following:    Height as of this encounter: 1.727 m (5' 8\").    Weight as of this encounter: 98 kg (216 lb). Body surface area is 2.17 meters squared.  No Pain (0) Comment: Data Unavailable   No LMP for male patient.  Allergies reviewed: Yes  Medications reviewed: Yes    Medications: Medication refills not needed today.  Pharmacy name entered into [employer]:    [email or web address] - [facility] [address] [facility] [address] [place] [address] [address] [facility]    Clinical concerns: follow up     8 minutes for nursing intake (face to face time)     Ita Pinto CMA  DISCHARGE PLAN:  Next appointments: See patient instruction section  Departure Mode: Ambulatory  Accompanied by: self  0 minutes for nursing discharge (face to face time)   Ita Pinto CMA                "

## 2018-07-19 NOTE — MR AVS SNAPSHOT
After Visit Summary   7/19/2018    Austin Garza    MRN: [id number]           Patient Information     Date Of Birth          1940        Visit Information        Provider Department      7/19/2018 3:15 PM Breana Perry MD [facility]        Today's Diagnoses     CLL (chronic lymphocytic leukemia) (H)        Leukocytosis, unspecified type          Care Instructions    -return to clinic in 1 month with labs a day prior          Follow-ups after your visit        Your next 10 appointments already scheduled     Aug 22, 2018  8:30 AM CDT   Return Visit with  ONCOLOGY NURSE   [facility] ([facility])    [facility]  [address]  [facility] [address] [phone]            Aug 23, 2018  3:15 PM CDT   Return Visit with Breana Perry MD   [facility] ([facility])    [facility]  [address]  [facility] [id number] [phone]              Future tests that were ordered for you today     Open Future Orders        Priority Expected Expires Ordered    CBC with platelets differential Routine 8/15/2018 7/19/2019 7/19/2018    Comprehensive metabolic panel Routine 8/15/2018 7/19/2019 7/19/2018    Lactate Dehydrogenase Routine 8/15/2018 7/19/2019 7/19/2018            Who to contact     If you have questions or need follow up information about today's clinic visit or your schedule please contact [facility] directly at [phone].  Normal or non-critical lab and imaging results will be communicated to you by MyChart, letter or phone within 4 business days after the clinic has received the results. If you do not hear from us within 7 days, please contact the clinic through MyChart or phone. If you have a critical or abnormal lab result, we will notify you by phone as soon as  "possible.  Submit refill requests through [employer] or call your pharmacy and they will forward the refill request to us. Please allow 3 business days for your refill to be completed.          Additional Information About Your Visit        [employer] Information     [employer] gives you secure access to your electronic health record. If you see a primary care provider, you can also send messages to your care team and make appointments. If you have questions, please call your primary care clinic.  If you do not have a primary care provider, please call [phone] and they will assist you.        Care EveryWhere ID     This is your Care EveryWhere ID. This could be used by other organizations to access your [place] medical records  [id number]        Your Vitals Were     Pulse Temperature Respirations Height Pulse Oximetry BMI (Body Mass Index)    75 97.5  F (36.4  C) (Tympanic) 16 1.727 m (5' 8\") 98% 32.84 kg/m2       Blood Pressure from Last 3 Encounters:   07/19/18 133/78   07/17/18 133/82   05/17/18 130/82    Weight from Last 3 Encounters:   07/19/18 98 kg (216 lb)   05/17/18 97.1 kg (214 lb)   03/06/18 94.8 kg (209 lb)              We Performed the Following     CBC with platelets differential     Leukemia Lymphoma Evaluation (Flow Cytometry)        Primary Care Provider Office Phone # Fax #    Sandi Eastman [phone] [address] [place] [address]        Equal Access to Services     [facility]: Hadii gertrude pachecoo Annie, waaxda luqadaha, qaybta kaalmada michael, mina early. So [facility] [phone].    ATENCIÓN: Si habla español, tiene a edward disposición servicios gratuitos de asistencia lingüística. Llame al [phone].    We comply with applicable federal civil rights laws and [place] laws. We do not discriminate on the basis of race, color, national origin, age, disability, sex, sexual orientation, or gender identity.            Thank " you!     Thank you for choosing [facility]  for your care. Our goal is always to provide you with excellent care. Hearing back from our patients is one way we can continue to improve our services. Please take a few minutes to complete the written survey that you may receive in the mail after your visit with us. Thank you!             Your Updated Medication List - Protect others around you: Learn how to safely use, store and throw away your medicines at www.disposemymeds.org.          This list is accurate as of 7/19/18  3:47 PM.  Always use your most recent med list.                   Brand Name Dispense Instructions for use Diagnosis    aspirin 325 MG tablet     90 tablet    Take 1 tablet (325 mg) by mouth every evening    Abdominal aortic aneurysm (AAA) without rupture (H)       blood glucose monitoring meter device kit     1 kit    Use to test blood sugars 2-3 times daily as directed. Brand per insurance formulary    Type II or unspecified type diabetes mellitus without mention of complication, not stated as uncontrolled       blood glucose monitoring test strip    ONETOUCH ULTRA    100 each    Test 2-3 times daily as directed, brand per insurance formulary.    Type 2 diabetes mellitus without complication, without long-term current use of insulin (H)       CENTRUM SILVER per tablet      Take 1 tablet by mouth daily        fluticasone 50 MCG/ACT spray    FLONASE    1 Bottle    Spray 2 sprays into both nostrils 2 times daily    Viral URI with cough       glimepiride 1 MG tablet    AMARYL    90 tablet    Take 1 tablet (1 mg) by mouth every morning (before breakfast)    Type 2 diabetes mellitus without complication, without long-term current use of insulin (H)       LIPITOR PO      Take 20 mg by mouth At Bedtime        losartan-hydrochlorothiazide 50-12.5 MG per tablet    HYZAAR    90 tablet    Take 1 tablet by mouth daily    Hypertension goal BP (blood pressure) < 140/90       metFORMIN  1000 MG tablet    GLUCOPHAGE    180 tablet    Take 1 tablet (1,000 mg) by mouth 2 times daily (with meals)    Type 2 diabetes mellitus without complication, without long-term current use of insulin (H)

## 2018-07-20 LAB — COPATH REPORT: NORMAL

## 2018-08-22 ENCOUNTER — HOSPITAL ENCOUNTER (OUTPATIENT)
Facility: CLINIC | Age: 78
Setting detail: SPECIMEN
Discharge: HOME | End: 2018-08-22
Attending: INTERNAL MEDICINE | Admitting: INTERNAL MEDICINE
Payer: MEDICARE

## 2018-08-22 ENCOUNTER — ONCOLOGY VISIT (OUTPATIENT)
Dept: ONCOLOGY | Facility: CLINIC | Age: 78
End: 2018-08-22
Attending: INTERNAL MEDICINE
Payer: MEDICARE

## 2018-08-22 DIAGNOSIS — C91.10 CLL (CHRONIC LYMPHOCYTIC LEUKEMIA) (H): ICD-10-CM

## 2018-08-22 LAB
ALBUMIN SERPL-MCNC: 3.9 G/DL (ref 3.4–5)
ALP SERPL-CCNC: 52 U/L (ref 40–150)
ALT SERPL W P-5'-P-CCNC: 30 U/L (ref 0–70)
ANION GAP SERPL CALCULATED.3IONS-SCNC: 5 MMOL/L (ref 3–14)
ANISOCYTOSIS BLD QL SMEAR: SLIGHT
AST SERPL W P-5'-P-CCNC: 17 U/L (ref 0–45)
BASOPHILS # BLD AUTO: 0 10E9/L (ref 0–0.2)
BASOPHILS NFR BLD AUTO: 0 %
BILIRUB SERPL-MCNC: 0.5 MG/DL (ref 0.2–1.3)
BUN SERPL-MCNC: 24 MG/DL (ref 7–30)
CALCIUM SERPL-MCNC: 8.8 MG/DL (ref 8.5–10.1)
CHLORIDE SERPL-SCNC: 106 MMOL/L (ref 94–109)
CO2 SERPL-SCNC: 26 MMOL/L (ref 20–32)
CREAT SERPL-MCNC: 1.3 MG/DL (ref 0.66–1.25)
DACRYOCYTES BLD QL SMEAR: SLIGHT
DIFFERENTIAL METHOD BLD: ABNORMAL
EOSINOPHIL # BLD AUTO: 0 10E9/L (ref 0–0.7)
EOSINOPHIL NFR BLD AUTO: 0 %
ERYTHROCYTE [DISTWIDTH] IN BLOOD BY AUTOMATED COUNT: 15.5 % (ref 10–15)
GFR SERPL CREATININE-BSD FRML MDRD: 54 ML/MIN/1.7M2
GLUCOSE SERPL-MCNC: 139 MG/DL (ref 70–99)
HCT VFR BLD AUTO: 30.8 % (ref 40–53)
HGB BLD-MCNC: 9.8 G/DL (ref 13.3–17.7)
LDH SERPL L TO P-CCNC: 231 U/L (ref 85–227)
LYMPHOCYTES # BLD AUTO: 58.5 10E9/L (ref 0.8–5.3)
LYMPHOCYTES NFR BLD AUTO: 91 %
MCH RBC QN AUTO: 34.8 PG (ref 26.5–33)
MCHC RBC AUTO-ENTMCNC: 31.8 G/DL (ref 31.5–36.5)
MCV RBC AUTO: 109 FL (ref 78–100)
MICROCYTES BLD QL SMEAR: PRESENT
MONOCYTES # BLD AUTO: 1.3 10E9/L (ref 0–1.3)
MONOCYTES NFR BLD AUTO: 2 %
NEUTROPHILS # BLD AUTO: 4.5 10E9/L (ref 1.6–8.3)
NEUTROPHILS NFR BLD AUTO: 7 %
OVALOCYTES BLD QL SMEAR: SLIGHT
PLATELET # BLD AUTO: 106 10E9/L (ref 150–450)
PLATELET # BLD EST: ABNORMAL 10*3/UL
POTASSIUM SERPL-SCNC: 5.1 MMOL/L (ref 3.4–5.3)
PROT SERPL-MCNC: 7 G/DL (ref 6.8–8.8)
RBC # BLD AUTO: 2.82 10E12/L (ref 4.4–5.9)
SMUDGE CELLS BLD QL SMEAR: PRESENT
SODIUM SERPL-SCNC: 137 MMOL/L (ref 133–144)
WBC # BLD AUTO: 64.3 10E9/L (ref 4–11)

## 2018-08-22 PROCEDURE — 85025 COMPLETE CBC W/AUTO DIFF WBC: CPT | Performed by: INTERNAL MEDICINE

## 2018-08-22 PROCEDURE — 36415 COLL VENOUS BLD VENIPUNCTURE: CPT

## 2018-08-22 PROCEDURE — 83615 LACTATE (LD) (LDH) ENZYME: CPT | Performed by: INTERNAL MEDICINE

## 2018-08-22 PROCEDURE — 80053 COMPREHEN METABOLIC PANEL: CPT | Performed by: INTERNAL MEDICINE

## 2018-08-22 NOTE — MR AVS SNAPSHOT
After Visit Summary   8/22/2018    Austin Garza    MRN: [id number]           Patient Information     Date Of Birth          1940        Visit Information        Provider Department      8/22/2018 8:30 AM  ONCOLOGY NURSE [facility]        Today's Diagnoses     CLL (chronic lymphocytic leukemia) (H)           Follow-ups after your visit        Your next 10 appointments already scheduled     Aug 23, 2018  3:15 PM CDT   Return Visit with Breana Perry MD   [facility] ([facility])    [facility]  [address]  [address]  [facility] [id number] [phone]              Who to contact     If you have questions or need follow up information about today's clinic visit or your schedule please contact [facility] directly at [phone].  Normal or non-critical lab and imaging results will be communicated to you by [employer], letter or phone within 4 business days after the clinic has received the results. If you do not hear from us within 7 days, please contact the clinic through [employer] or phone. If you have a critical or abnormal lab result, we will notify you by phone as soon as possible.  Submit refill requests through [employer] or call your pharmacy and they will forward the refill request to us. Please allow 3 business days for your refill to be completed.          Additional Information About Your Visit        MyChart Information     [employer] gives you secure access to your electronic health record. If you see a primary care provider, you can also send messages to your care team and make appointments. If you have questions, please call your primary care clinic.  If you do not have a primary care provider, please call [phone] and they will assist you.        Care EveryWhere ID     This is your Care EveryWhere ID. This could be used by other organizations to access your  [facility] medical records  [id number]         Blood Pressure from Last 3 Encounters:   07/19/18 133/78   07/17/18 133/82   05/17/18 130/82    Weight from Last 3 Encounters:   07/19/18 98 kg (216 lb)   05/17/18 97.1 kg (214 lb)   03/06/18 94.8 kg (209 lb)              We Performed the Following     CBC with platelets differential     Comprehensive metabolic panel     Lactate Dehydrogenase        Primary Care Provider Office Phone # Fax #    Sandi Eastman [phone] [address] [place] [address]        Equal Access to Services     [facility]: Hadii aad ku hadasho Sowillianali, waaxda luqadaha, qaybta kaalmada adesheriyada, mina bennett . So [facility] [phone].    ATENCIÓN: Si habla español, tiene a edward disposición servicios gratuitos de asistencia lingüística. [facility] [phone].    We comply with applicable federal civil rights laws and [place] laws. We do not discriminate on the basis of race, color, national origin, age, disability, sex, sexual orientation, or gender identity.            Thank you!     Thank you for choosing [facility]  for your care. Our goal is always to provide you with excellent care. Hearing back from our patients is one way we can continue to improve our services. Please take a few minutes to complete the written survey that you may receive in the mail after your visit with us. Thank you!             Your Updated Medication List - Protect others around you: Learn how to safely use, store and throw away your medicines at www.disposemymeds.org.          This list is accurate as of 8/22/18  8:51 AM.  Always use your most recent med list.                   Brand Name Dispense Instructions for use Diagnosis    aspirin 325 MG tablet     90 tablet    Take 1 tablet (325 mg) by mouth every evening    Abdominal aortic aneurysm (AAA) without rupture (H)       blood glucose monitoring meter device kit     1 kit     Use to test blood sugars 2-3 times daily as directed. Brand per insurance formulary    Type II or unspecified type diabetes mellitus without mention of complication, not stated as uncontrolled       blood glucose monitoring test strip    ONETOUCH ULTRA    100 each    Test 2-3 times daily as directed, brand per insurance formulary.    Type 2 diabetes mellitus without complication, without long-term current use of insulin (H)       CENTRUM SILVER per tablet      Take 1 tablet by mouth daily        fluticasone 50 MCG/ACT spray    FLONASE    1 Bottle    Spray 2 sprays into both nostrils 2 times daily    Viral URI with cough       glimepiride 1 MG tablet    AMARYL    90 tablet    Take 1 tablet (1 mg) by mouth every morning (before breakfast)    Type 2 diabetes mellitus without complication, without long-term current use of insulin (H)       LIPITOR PO      Take 20 mg by mouth At Bedtime        losartan-hydrochlorothiazide 50-12.5 MG per tablet    HYZAAR    90 tablet    Take 1 tablet by mouth daily    Hypertension goal BP (blood pressure) < 140/90       metFORMIN 1000 MG tablet    GLUCOPHAGE    180 tablet    Take 1 tablet (1,000 mg) by mouth 2 times daily (with meals)    Type 2 diabetes mellitus without complication, without long-term current use of insulin (H)

## 2018-08-22 NOTE — LETTER
8/22/2018         RE: Austin Garza  [address] [place] [address]        Dear Colleague,    Thank you for referring your patient, Austin Garza, to the [facility]. Please see a copy of my visit note below.    Medical Assistant Note:  Austin Garza presents today for lab draw.    Patient seen by provider today: No.   present during visit today: Not Applicable.    Concerns: No Concerns.    Procedure:  Labs drawn: .    Post Assessment:  Labs drawn without difficulty: Yes.    Discharge Plan:  Departure Mode: Ambulatory.    Face to Face Time: 10.    Ita Pinto              Again, thank you for allowing me to participate in the care of your patient.        Sincerely,        [place] [facility] Nurse

## 2018-08-23 ENCOUNTER — ONCOLOGY VISIT (OUTPATIENT)
Dept: ONCOLOGY | Facility: CLINIC | Age: 78
End: 2018-08-23
Attending: INTERNAL MEDICINE
Payer: MEDICARE

## 2018-08-23 VITALS
TEMPERATURE: 97.5 F | RESPIRATION RATE: 16 BRPM | SYSTOLIC BLOOD PRESSURE: 149 MMHG | BODY MASS INDEX: 32.49 KG/M2 | OXYGEN SATURATION: 97 % | HEART RATE: 108 BPM | HEIGHT: 68 IN | WEIGHT: 214.4 LBS | DIASTOLIC BLOOD PRESSURE: 84 MMHG

## 2018-08-23 DIAGNOSIS — C91.10 CLL (CHRONIC LYMPHOCYTIC LEUKEMIA) (H): Primary | ICD-10-CM

## 2018-08-23 PROCEDURE — G0463 HOSPITAL OUTPT CLINIC VISIT: HCPCS

## 2018-08-23 PROCEDURE — 99213 OFFICE O/P EST LOW 20 MIN: CPT | Performed by: INTERNAL MEDICINE

## 2018-08-23 ASSESSMENT — PAIN SCALES - GENERAL: PAINLEVEL: NO PAIN (0)

## 2018-08-23 NOTE — PROGRESS NOTES
[facility]    Hematology/Oncology Established Patient Note      Today's Date: 8/23/18    Reason for Follow-up: CLL      HISTORY OF PRESENT ILLNESS: Austin Garza is a 78 year old male with PMHx of DMII, HTN who presented with leukocytosis.  In November 2017, he was found to have elevated WBC of 61.7K, hemoglobin of 10.4, and platelet of 115K.  There were no other CBC results available between 2010 and 2017.  Prior to 2010, he had normal CBC, with normal to mild anemia, and mild thrombocytopenia.   He was asymptomatic.    He underwent bone marrow biopsy on 11/21/17, which was consistent with chronic lymphocytic leukemia/small lymphocytic lymphoma, with 13% ringed sideroblasts identified.  The presence of increased numbers of ringed sideroblasts raises the possibility of an associated myelodysplastic syndrome.  Dysplastic changes are not identified though.  Many cases exhibiting ringed sideroblasts are metabolic origin, without significant risk of progression to acute leukemia, and these typically do not show dysplastic morphology as is the case with this specimen.  15% ringed sideroblasts are required for a diagnosis for RARS, which this specimen does not meet.  Flow cytometry is also consistent with CLL/SLL.  Cytogenetics show two (10%) of the metaphase cells comprise a clone characterized by a deletion within the proximal long arm of a chromosome 13 as the sole karyotypic abnormality.  Three (15%) metaphases had loss of the Y chromosome as the sole abnormality.    CT scan on 11/29/17 shows mildly enlarged left axillary lymph node and periaortic lymph nodes in the retroperitoneum of the abdomen.  Spleen is also enlarged.    On his staging CT scan for CLL, he was incidentally found to have a large infrarenal abdominal aortic aneurysm, measuring 7.6 cm.  He was seen by Dr. Thomas, and he underwent EVAR on 12/4/17.       INTERIM HISTORY: Austin is here for follow-up today.  He says that he  feels well.  He denies any new symptoms.  He says that he actually feels pretty good.  He feels tired at times.    REVIEW OF SYSTEMS:   14 point ROS was reviewed and is negative other than as noted above in HPI.       HOME MEDICATIONS:  Current Outpatient Prescriptions   Medication Sig Dispense Refill     aspirin 325 MG tablet Take 1 tablet (325 mg) by mouth every evening 90 tablet 3     Atorvastatin Calcium (LIPITOR PO) Take 20 mg by mouth At Bedtime       blood glucose monitoring (ONE TOUCH ULTRA) test strip Test 2-3 times daily as directed, brand per insurance formulary. 100 each 0     Blood Glucose Monitoring Suppl (ONE TOUCH ULTRA 2) W/DEVICE KIT Use to test blood sugars 2-3 times daily as directed. Brand per insurance formulary   1 kit 0     fluticasone (FLONASE) 50 MCG/ACT spray Spray 2 sprays into both nostrils 2 times daily 1 Bottle 11     glimepiride (AMARYL) 1 MG tablet Take 1 tablet (1 mg) by mouth every morning (before breakfast) 90 tablet 3     losartan-hydrochlorothiazide (HYZAAR) 50-12.5 MG per tablet Take 1 tablet by mouth daily 90 tablet 3     metFORMIN (GLUCOPHAGE) 1000 MG tablet Take 1 tablet (1,000 mg) by mouth 2 times daily (with meals) 180 tablet 3     Multiple Vitamins-Minerals (CENTRUM SILVER) per tablet Take 1 tablet by mouth daily           ALLERGIES:  Allergies   Allergen Reactions     Ace Inhibitors      cough         PAST MEDICAL HISTORY:  Past Medical History:   Diagnosis Date     Diaphragmatic hernia without mention of obstruction or gangrene      Diverticulitis of colon (without mention of hemorrhage)(562.11)      Esophageal reflux      Irritable bowel syndrome      Macular degeneration (senile) of retina, unspecified      Squamous Cell Carcinoma, Back 1988     Type II or unspecified type diabetes mellitus without mention of complication, not stated as uncontrolled      Unspecified essential hypertension          PAST SURGICAL HISTORY:  Past Surgical History:   Procedure Laterality  Date     APPENDECTOMY       BONE MARROW BIOPSY, BONE SPECIMEN, NEEDLE/TROCAR N/A 2017    Procedure: BIOPSY BONE MARROW;  BONE MARROW BIOPSY;  Surgeon: Shady Garcia MD;  Location:  GI     COLONOSCOPY       ENDOVASCULAR REPAIR ANEURYSM ABDOMINAL AORTA N/A 2017    Procedure: ENDOVASCULAR REPAIR ANEURYSM ABDOMINAL AORTA;  ENDOVASCULAR REPAIR ANEURYSM ABDOMINAL AORTA;  Surgeon: Milton Cazares MD;  Location:  OR     SURGICAL HISTORY OF -       Squamous cell skin cancer resection - back     SURGICAL HISTORY OF -       skin tags resection     SURGICAL HISTORY OF -   2001    stress thall. normal     SURGICAL HISTORY OF -   2002    colonoscopy     SURGICAL HISTORY OF -       Fistulotomy with marsupialization for repair of fisture in ano         SOCIAL HISTORY:  Social History     Social History     Marital status:      Spouse name: librado     Number of children: 0     Years of education: 17     Occupational History     retired      Social History Main Topics     Smoking status: Former Smoker     Packs/day: 0.50     Years: 20.00     Quit date: 1975     Smokeless tobacco: Former User     Alcohol use 6.0 - 8.4 oz/week     10 - 14 Standard drinks or equivalent per week      Comment: 2 drinks a week     Drug use: No     Sexual activity: No     Other Topics Concern     Not on file     Social History Narrative   He quit smoking in .  He drinks 1-3 glasses of wine a night with dinner.  He is retired, and used to work as a .  He lives with his wife in [place].  His cousin had lung cancer and  around age 69.  Otherwise, he denies family history of cancer.        FAMILY HISTORY:  Family History   Problem Relation Age of Onset     Cerebrovascular Disease Father      x 2     Hypertension Mother      C.A.D. Mother      Cancer Mother      skin     Eye Disorder Mother      glaucoma     Prostate Cancer No family hx of      Cancer - colorectal No family hx of   "        PHYSICAL EXAM:  Vital signs:  /84  Pulse 108  Temp 97.5  F (36.4  C) (Tympanic)  Resp 16  Ht 1.727 m (5' 8\")  Wt 97.3 kg (214 lb 6.4 oz)  SpO2 97%  BMI 32.6 kg/m2   GENERAL/CONSTITUTIONAL: No acute distress.  EYES: No scleral icterus.  MUSCULOSKELETAL: Warm and well-perfused.  NEUROLOGIC: Alert, oriented, answers questions appropriately.  INTEGUMENTARY: No jaundice.      LABS:  .CBC RESULTS:   Recent Labs   Lab Test  08/22/18   0839   WBC  64.3*   RBC  2.82*   HGB  9.8*   HCT  30.8*   MCV  109*   MCH  34.8*   MCHC  31.8   RDW  15.5*   PLT  106*       PATHOLOGY:  Bone marrow biopsy 11/21/17:  FINAL DIAGNOSIS:   Peripheral blood demonstrating macrocytic anemia with thrombocytopenia     Bone marrow trephine biopsy and aspirate demonstrating hypercellular   bone marrow involved by chronic lymphocytic leukemia/small lymphocytic   lymphoma, 13% ringed sideroblasts identified (see comment)     COMMENT:   Immunophenotyping studies demonstrate a kappa monotypic CD5 positive   B-cell population most compatible with chronic lymphocytic leukemia.  An   immunoperoxidase stain for cyclin D1 is pending.  Dysplastic changes are   not identified within the erythroid series.  However, the presence of   increased numbers of ringed sideroblasts raises the possibility of an   associated myelodysplastic syndrome, particularly in light of the noted   increased mean red cell volume and associated thrombocytopenia.  The   thrombocytopenia may also reflect reduced numbers of megakaryocytes   given the involvement of marrow by chronic lymphocytic leukemia.  Many   cases exhibiting ringed sideroblasts are metabolic in origin, without   significant risk of progression to acute leukemia, and these typically   do not show dysplastic morphology as is the case with the current   specimen.  Moreover, 15% ringed sideroblasts are required for a   diagnosis of  RARS. Cytogenetic studies are pending.     Flow  INTERPRETATION:   Bone " Marrow, Left:        CD5 positive Kappa monotypic B cells (82%)     COMMENT:   The clonal B-cell population present in this specimen has a similar   immunophenotype as reported previously in the blood from this patient   ([id number]).  The immunophenotypic findings are suggestive of marrow   involvement by small lymphocytic lymphoma/chronic lymphocytic leukemia   (SLL/CLL). Large cells may not survive specimen processing.  There may   be peripheral blood contamination. Final interpretation requires   correlation with results of other ancillary studies, morphologic and   clinical features.     ISCN:   46,XY,del(13)(q12q14)[2]/45,X,-Y[3]/46,XY[15].nuc   alton(ATMx2,TP53x1)[4/200],(V65C376c7,VCCT4q4)[21/200]     INTERPRETATION:   Two (10%) of the metaphase cells analyzed comprised a clone   characterized by a deletion within the proximal long arm of a chromosome   13 as the sole karyotypic abnormality. Additionally, three (15%)   metaphases had loss of the Y chromosome as the sole abnormality.     These findings confirm and expand those of the previously reported FISH   analysis ([id number]) that showed 10.5% of interphase cells to have a   signal pattern indicative of loss of the [id number] locus.     Loss of the Y chromosome, as seen in this case, has been reported both   as an acquired clonal abnormality associated with hematologic (primarily   myeloid) disorders and also as a clinically insignificant finding   associated with the normal aging process in adult males. When presenting   as a clonal abnormality, loss of Y is typically seen in addition to   other cytogenetic abnormalities. This patient's age and the absence of   other chromosomal abnormalities suggest that the loss of Y represents an   age-related finding in this case.         ASSESSMENT/PLAN:  Austin Garza is a 77 year old male with:    1) CLL/SLL: BLACKWOOD stage III, with lymphocytosis, lymphadenopathy, splenomegaly, and anemia.  He has mild thrombocytopenia  as well, but is above 100K.  He presented with leukocytosis with WBC of 61.7K, hemoglobin of 10.4, and platelet count of 115K on diagnosis.  Bone marrow biopsy and flow cytometry confirmed CLL/SLL.  CT scan shows mildly enlarged left axillary lymph node and periaortic lymph nodes in the retroperitoneum of the abdomen, with enlarged spleen.      CBC from today reviewed with Austin.  WBC is stable at baseline at 64.3K, which was around the level that it was when he was diagnosed with CLL in November 2017.    He has symptoms of fatigue and sweats around his neck at night.  Elevated lymphocyte count is not necessarily an indication to start treatment.  However, I discussed that if these symptoms are thought to be related to the CLL and if his lymphocyte count continues to rise rapidly or if his cytopenias worsen, this may warrant starting treatment.  He says that he would be okay with that if it is needed.     Since WBC is fairly stable at his baseline, hemoglobin and platelet count are also stable, and his symptoms have not changed, will continue to monitor closely for now.    -RTC in 2 month with repeat labs.    2) Abdominal aortic aneurysm: measures up to 7.6 cm on CT scan, incidentally found.  He underwent EVAR on 12/4/17.  -follow-up with vascular surgery; he is to follow-up with repeat CTA around January 2019    3) Diabetes, hypertension:  -following with PCP      I spent a total of 15 minutes with the patient, with over >50% of the time in counseling and/or coordination of care.      Breana Perry MD  Hematology/Oncology  [facility]

## 2018-08-23 NOTE — NURSING NOTE
"Oncology Rooming Note    August 23, 2018 3:12 PM   Austin Garza is a 78 year old male who presents for:    Chief Complaint   Patient presents with     Oncology Clinic Visit     CLL (chronic lymphocytic leukemia)     Initial Vitals: /84  Pulse 108  Temp 97.5  F (36.4  C) (Tympanic)  Resp 16  Ht 1.727 m (5' 8\")  Wt 97.3 kg (214 lb 6.4 oz)  SpO2 97%  BMI 32.6 kg/m2 Estimated body mass index is 32.6 kg/(m^2) as calculated from the following:    Height as of this encounter: 1.727 m (5' 8\").    Weight as of this encounter: 97.3 kg (214 lb 6.4 oz). Body surface area is 2.16 meters squared.  No Pain (0) Comment: Data Unavailable   No LMP for male patient.  Allergies reviewed: Yes  Medications reviewed: Yes    Medications: Medication refills not needed today.  Pharmacy name entered into [employer]:    [facility] - [facility] [address] [facility] [address] [place], [place] - [address] [facility]    Clinical concerns: CLL (chronic lymphocytic leukemia)     8 minutes for nursing intake (face to face time)     Kimberley Luna CMA            DISCHARGE PLAN:  Next appointments: See patient instruction section  Departure Mode: Ambulatory  Accompanied by: self  0 minutes for nursing discharge (face to face time)   Kimberley Luna CMA      "

## 2018-08-23 NOTE — MR AVS SNAPSHOT
After Visit Summary   8/23/2018    Austin Garza    MRN: [id number]           Patient Information     Date Of Birth          1940        Visit Information        Provider Department      8/23/2018 3:15 PM Breana Perry MD [facility]  Oncology [facility]      Today's Diagnoses     CLL (chronic lymphocytic leukemia) (H)    -  1      Care Instructions    -Return to clinic in 2 months with labs - okay to do the day prior.  -patient left; please call patient to make the appointment          Follow-ups after your visit        Future tests that were ordered for you today     Open Future Orders        Priority Expected Expires Ordered    CBC with platelets differential Routine 10/18/2018 8/23/2019 8/23/2018    Comprehensive metabolic panel Routine 10/18/2018 8/23/2019 8/23/2018    Lactate Dehydrogenase Routine 10/18/2018 8/23/2019 8/23/2018            Who to contact     If you have questions or need follow up information about today's clinic visit or your schedule please contact [facility] directly at [phone].  Normal or non-critical lab and imaging results will be communicated to you by MyChart, letter or phone within 4 business days after the clinic has received the results. If you do not hear from us within 7 days, please contact the clinic through [employer] or phone. If you have a critical or abnormal lab result, we will notify you by phone as soon as possible.  Submit refill requests through [employer] or call your pharmacy and they will forward the refill request to us. Please allow 3 business days for your refill to be completed.          Additional Information About Your Visit        MyChart Information     [employer] gives you secure access to your electronic health record. If you see a primary care provider, you can also send messages to your care team and make appointments. If you have questions, please call your primary care clinic.  If  "you do not have a primary care provider, please call [phone] and they will assist you.        Care EveryWhere ID     This is your Care EveryWhere ID. This could be used by other organizations to access your [facility] medical records  [id number]        Your Vitals Were     Pulse Temperature Respirations Height Pulse Oximetry BMI (Body Mass Index)    108 97.5  F (36.4  C) (Tympanic) 16 1.727 m (5' 8\") 97% 32.6 kg/m2       Blood Pressure from Last 3 Encounters:   08/23/18 149/84   07/19/18 133/78   07/17/18 133/82    Weight from Last 3 Encounters:   08/23/18 97.3 kg (214 lb 6.4 oz)   07/19/18 98 kg (216 lb)   05/17/18 97.1 kg (214 lb)               Primary Care Provider Office Phone # Fax #    Sandi Eastman [phone] [address] [place] [address]        Equal Access to Services     [facility] AH: Hadii aad ku hadasho Soomaali, waaxda luqadaha, qaybta kaalmada adeegyada, waxay gracielain haygavino bennett . So [facility] [phone].    ATENCIÓN: Si habla español, tiene a edward disposición servicios gratuitos de asistencia lingüística. [facility] [phone].    We comply with applicable federal civil rights laws and [place] laws. We do not discriminate on the basis of race, color, national origin, age, disability, sex, sexual orientation, or gender identity.            Thank you!     Thank you for choosing [facility]ER [facility]  for your care. Our goal is always to provide you with excellent care. Hearing back from our patients is one way we can continue to improve our services. Please take a few minutes to complete the written survey that you may receive in the mail after your visit with us. Thank you!             Your Updated Medication List - Protect others around you: Learn how to safely use, store and throw away your medicines at www.disposemymeds.org.          This list is accurate as of 8/23/18  5:24 PM.  Always use your most recent med list.          "          Brand Name Dispense Instructions for use Diagnosis    aspirin 325 MG tablet     90 tablet    Take 1 tablet (325 mg) by mouth every evening    Abdominal aortic aneurysm (AAA) without rupture (H)       blood glucose monitoring meter device kit     1 kit    Use to test blood sugars 2-3 times daily as directed. Brand per insurance formulary    Type II or unspecified type diabetes mellitus without mention of complication, not stated as uncontrolled       blood glucose monitoring test strip    ONETOUCH ULTRA    100 each    Test 2-3 times daily as directed, brand per insurance formulary.    Type 2 diabetes mellitus without complication, without long-term current use of insulin (H)       CENTRUM SILVER per tablet      Take 1 tablet by mouth daily        fluticasone 50 MCG/ACT spray    FLONASE    1 Bottle    Spray 2 sprays into both nostrils 2 times daily    Viral URI with cough       glimepiride 1 MG tablet    AMARYL    90 tablet    Take 1 tablet (1 mg) by mouth every morning (before breakfast)    Type 2 diabetes mellitus without complication, without long-term current use of insulin (H)       LIPITOR PO      Take 20 mg by mouth At Bedtime        losartan-hydrochlorothiazide 50-12.5 MG per tablet    HYZAAR    90 tablet    Take 1 tablet by mouth daily    Hypertension goal BP (blood pressure) < 140/90       metFORMIN 1000 MG tablet    GLUCOPHAGE    180 tablet    Take 1 tablet (1,000 mg) by mouth 2 times daily (with meals)    Type 2 diabetes mellitus without complication, without long-term current use of insulin (H)

## 2018-08-23 NOTE — LETTER
8/23/2018         RE: Austin Garza  [address] [place] [address]        Dear Colleague,    Thank you for referring your patient, Austin Garza, to the [facility]ER C[facility]. Please see a copy of my visit note below.    [facility]    Hematology/Oncology Established Patient Note      Today's Date: 8/23/18    Reason for Follow-up: CLL      HISTORY OF PRESENT ILLNESS: Austin Garza is a 78 year old male with PMHx of DMII, HTN who presented with leukocytosis.  In November 2017, he was found to have elevated WBC of 61.7K, hemoglobin of 10.4, and platelet of 115K.  There were no other CBC results available between 2010 and 2017.  Prior to 2010, he had normal CBC, with normal to mild anemia, and mild thrombocytopenia.   He was asymptomatic.    He underwent bone marrow biopsy on 11/21/17, which was consistent with chronic lymphocytic leukemia/small lymphocytic lymphoma, with 13% ringed sideroblasts identified.  The presence of increased numbers of ringed sideroblasts raises the possibility of an associated myelodysplastic syndrome.  Dysplastic changes are not identified though.  Many cases exhibiting ringed sideroblasts are metabolic origin, without significant risk of progression to acute leukemia, and these typically do not show dysplastic morphology as is the case with this specimen.  15% ringed sideroblasts are required for a diagnosis for RARS, which this specimen does not meet.  Flow cytometry is also consistent with CLL/SLL.  Cytogenetics show two (10%) of the metaphase cells comprise a clone characterized by a deletion within the proximal long arm of a chromosome 13 as the sole karyotypic abnormality.  Three (15%) metaphases had loss of the Y chromosome as the sole abnormality.    CT scan on 11/29/17 shows mildly enlarged left axillary lymph node and periaortic lymph nodes in the retroperitoneum of the abdomen.  Spleen is also enlarged.    On his staging CT  scan for CLL, he was incidentally found to have a large infrarenal abdominal aortic aneurysm, measuring 7.6 cm.  He was seen by Dr. Thomas, and he underwent EVAR on 12/4/17.       INTERIM HISTORY: Austin is here for follow-up today.  He says that he feels well.  He denies any new symptoms.  He says that he actually feels pretty good.  He feels tired at times.    REVIEW OF SYSTEMS:   14 point ROS was reviewed and is negative other than as noted above in HPI.       HOME MEDICATIONS:  Current Outpatient Prescriptions   Medication Sig Dispense Refill     aspirin 325 MG tablet Take 1 tablet (325 mg) by mouth every evening 90 tablet 3     Atorvastatin Calcium (LIPITOR PO) Take 20 mg by mouth At Bedtime       blood glucose monitoring (ONE TOUCH ULTRA) test strip Test 2-3 times daily as directed, brand per insurance formulary. 100 each 0     Blood Glucose Monitoring Suppl (ONE TOUCH ULTRA 2) W/DEVICE KIT Use to test blood sugars 2-3 times daily as directed. Brand per insurance formulary   1 kit 0     fluticasone (FLONASE) 50 MCG/ACT spray Spray 2 sprays into both nostrils 2 times daily 1 Bottle 11     glimepiride (AMARYL) 1 MG tablet Take 1 tablet (1 mg) by mouth every morning (before breakfast) 90 tablet 3     losartan-hydrochlorothiazide (HYZAAR) 50-12.5 MG per tablet Take 1 tablet by mouth daily 90 tablet 3     metFORMIN (GLUCOPHAGE) 1000 MG tablet Take 1 tablet (1,000 mg) by mouth 2 times daily (with meals) 180 tablet 3     Multiple Vitamins-Minerals (CENTRUM SILVER) per tablet Take 1 tablet by mouth daily           ALLERGIES:  Allergies   Allergen Reactions     Ace Inhibitors      cough         PAST MEDICAL HISTORY:  Past Medical History:   Diagnosis Date     Diaphragmatic hernia without mention of obstruction or gangrene      Diverticulitis of colon (without mention of hemorrhage)(562.11)      Esophageal reflux      Irritable bowel syndrome      Macular degeneration (senile) of retina, unspecified      Squamous Cell  Carcinoma, Back      Type II or unspecified type diabetes mellitus without mention of complication, not stated as uncontrolled      Unspecified essential hypertension          PAST SURGICAL HISTORY:  Past Surgical History:   Procedure Laterality Date     APPENDECTOMY       BONE MARROW BIOPSY, BONE SPECIMEN, NEEDLE/TROCAR N/A 2017    Procedure: BIOPSY BONE MARROW;  BONE MARROW BIOPSY;  Surgeon: Shady Garcia MD;  Location:  GI     COLONOSCOPY       ENDOVASCULAR REPAIR ANEURYSM ABDOMINAL AORTA N/A 2017    Procedure: ENDOVASCULAR REPAIR ANEURYSM ABDOMINAL AORTA;  ENDOVASCULAR REPAIR ANEURYSM ABDOMINAL AORTA;  Surgeon: Milton Cazares MD;  Location:  OR     SURGICAL HISTORY OF -       Squamous cell skin cancer resection - back     SURGICAL HISTORY OF -       skin tags resection     SURGICAL HISTORY OF -   2001    stress thall. normal     SURGICAL HISTORY OF -   2002    colonoscopy     SURGICAL HISTORY OF -       Fistulotomy with marsupialization for repair of fisture in ano         SOCIAL HISTORY:  Social History     Social History     Marital status:      Spouse name: librado     Number of children: 0     Years of education: 17     Occupational History     retired      Social History Main Topics     Smoking status: Former Smoker     Packs/day: 0.50     Years: 20.00     Quit date: 1975     Smokeless tobacco: Former User     Alcohol use 6.0 - 8.4 oz/week     10 - 14 Standard drinks or equivalent per week      Comment: 2 drinks a week     Drug use: No     Sexual activity: No     Other Topics Concern     Not on file     Social History Narrative   He quit smoking in .  He drinks 1-3 glasses of wine a night with dinner.  He is retired, and used to work as a .  He lives with his wife in [place].  His cousin had lung cancer and  around age 69.  Otherwise, he denies family history of cancer.        FAMILY HISTORY:  Family History   Problem Relation  "Age of Onset     Cerebrovascular Disease Father      x 2     Hypertension Mother      C.A.D. Mother      Cancer Mother      skin     Eye Disorder Mother      glaucoma     Prostate Cancer No family hx of      Cancer - colorectal No family hx of          PHYSICAL EXAM:  Vital signs:  /84  Pulse 108  Temp 97.5  F (36.4  C) (Tympanic)  Resp 16  Ht 1.727 m (5' 8\")  Wt 97.3 kg (214 lb 6.4 oz)  SpO2 97%  BMI 32.6 kg/m2   GENERAL/CONSTITUTIONAL: No acute distress.  EYES: No scleral icterus.  MUSCULOSKELETAL: Warm and well-perfused.  NEUROLOGIC: Alert, oriented, answers questions appropriately.  INTEGUMENTARY: No jaundice.      LABS:  .CBC RESULTS:   Recent Labs   Lab Test  08/22/18   0839   WBC  64.3*   RBC  2.82*   HGB  9.8*   HCT  30.8*   MCV  109*   MCH  34.8*   MCHC  31.8   RDW  15.5*   PLT  106*       PATHOLOGY:  Bone marrow biopsy 11/21/17:  FINAL DIAGNOSIS:   Peripheral blood demonstrating macrocytic anemia with thrombocytopenia     Bone marrow trephine biopsy and aspirate demonstrating hypercellular   bone marrow involved by chronic lymphocytic leukemia/small lymphocytic   lymphoma, 13% ringed sideroblasts identified (see comment)     COMMENT:   Immunophenotyping studies demonstrate a kappa monotypic CD5 positive   B-cell population most compatible with chronic lymphocytic leukemia.  An   immunoperoxidase stain for cyclin D1 is pending.  Dysplastic changes are   not identified within the erythroid series.  However, the presence of   increased numbers of ringed sideroblasts raises the possibility of an   associated myelodysplastic syndrome, particularly in light of the noted   increased mean red cell volume and associated thrombocytopenia.  The   thrombocytopenia may also reflect reduced numbers of megakaryocytes   given the involvement of marrow by chronic lymphocytic leukemia.  Many   cases exhibiting ringed sideroblasts are metabolic in origin, without   significant risk of progression to acute " leukemia, and these typically   do not show dysplastic morphology as is the case with the current   specimen.  Moreover, 15% ringed sideroblasts are required for a   diagnosis of  RARS. Cytogenetic studies are pending.     Flow  INTERPRETATION:   Bone Marrow, Left:        CD5 positive Kappa monotypic B cells (82%)     COMMENT:   The clonal B-cell population present in this specimen has a similar   immunophenotype as reported previously in the blood from this patient   ([id number]).  The immunophenotypic findings are suggestive of marrow   involvement by small lymphocytic lymphoma/chronic lymphocytic leukemia   (SLL/CLL). Large cells may not survive specimen processing.  There may   be peripheral blood contamination. Final interpretation requires   correlation with results of other ancillary studies, morphologic and   clinical features.     ISCN:   46,XY,del(13)(q12q14)[2]/45,X,-Y[3]/46,XY[15].nuc   alton(ATMx2,TP53x1)[4/200],(C88Q326j2,ZDWZ3l2)[21/200]     INTERPRETATION:   Two (10%) of the metaphase cells analyzed comprised a clone   characterized by a deletion within the proximal long arm of a chromosome   13 as the sole karyotypic abnormality. Additionally, three (15%)   metaphases had loss of the Y chromosome as the sole abnormality.     These findings confirm and expand those of the previously reported FISH   analysis ([id number]) that showed 10.5% of interphase cells to have a   signal pattern indicative of loss of the [id number] locus.     Loss of the Y chromosome, as seen in this case, has been reported both   as an acquired clonal abnormality associated with hematologic (primarily   myeloid) disorders and also as a clinically insignificant finding   associated with the normal aging process in adult males. When presenting   as a clonal abnormality, loss of Y is typically seen in addition to   other cytogenetic abnormalities. This patient's age and the absence of   other chromosomal abnormalities suggest that the  loss of Y represents an   age-related finding in this case.         ASSESSMENT/PLAN:  Austin Garza is a 77 year old male with:    1) CLL/SLL: BLACKWOOD stage III, with lymphocytosis, lymphadenopathy, splenomegaly, and anemia.  He has mild thrombocytopenia as well, but is above 100K.  He presented with leukocytosis with WBC of 61.7K, hemoglobin of 10.4, and platelet count of 115K on diagnosis.  Bone marrow biopsy and flow cytometry confirmed CLL/SLL.  CT scan shows mildly enlarged left axillary lymph node and periaortic lymph nodes in the retroperitoneum of the abdomen, with enlarged spleen.      CBC from today reviewed with Austin.  WBC is stable at baseline at 64.3K, which was around the level that it was when he was diagnosed with CLL in November 2017.    He has symptoms of fatigue and sweats around his neck at night.  Elevated lymphocyte count is not necessarily an indication to start treatment.  However, I discussed that if these symptoms are thought to be related to the CLL and if his lymphocyte count continues to rise rapidly or if his cytopenias worsen, this may warrant starting treatment.  He says that he would be okay with that if it is needed.     Since WBC is fairly stable at his baseline, hemoglobin and platelet count are also stable, and his symptoms have not changed, will continue to monitor closely for now.    -RTC in 2 month with repeat labs.    2) Abdominal aortic aneurysm: measures up to 7.6 cm on CT scan, incidentally found.  He underwent EVAR on 12/4/17.  -follow-up with vascular surgery; he is to follow-up with repeat CTA around January 2019    3) Diabetes, hypertension:  -following with PCP      I spent a total of 15 minutes with the patient, with over >50% of the time in counseling and/or coordination of care.      Breana Perry MD  Hematology/Oncology  [facility]      Again, thank you for allowing me to participate in the care of your patient.         Sincerely,        Breana Perry MD

## 2018-08-23 NOTE — PATIENT INSTRUCTIONS
-Return to clinic in 2 months with labs - okay to do the day prior.  -patient left; please call patient to make the appointment scheduled Clair Hull

## 2018-08-30 ENCOUNTER — OFFICE VISIT (OUTPATIENT)
Dept: OPTOMETRY | Facility: CLINIC | Age: 78
End: 2018-08-30
Payer: COMMERCIAL

## 2018-08-30 DIAGNOSIS — H52.13 MYOPIA OF BOTH EYES WITH ASTIGMATISM: ICD-10-CM

## 2018-08-30 DIAGNOSIS — H25.13 NUCLEAR SCLEROSIS OF BOTH EYES: ICD-10-CM

## 2018-08-30 DIAGNOSIS — H52.203 MYOPIA OF BOTH EYES WITH ASTIGMATISM: ICD-10-CM

## 2018-08-30 DIAGNOSIS — H52.4 PRESBYOPIA: ICD-10-CM

## 2018-08-30 DIAGNOSIS — E11.9 DIABETES MELLITUS WITHOUT OPHTHALMIC MANIFESTATIONS (H): Primary | ICD-10-CM

## 2018-08-30 PROCEDURE — 92004 COMPRE OPH EXAM NEW PT 1/>: CPT | Performed by: OPTOMETRIST

## 2018-08-30 PROCEDURE — 92015 DETERMINE REFRACTIVE STATE: CPT | Performed by: OPTOMETRIST

## 2018-08-30 ASSESSMENT — REFRACTION_WEARINGRX
OS_AXIS: 110
OS_SPHERE: -3.50
SPECS_TYPE: PAL
OD_AXIS: 099
OS_ADD: +2.50
OD_ADD: +2.50
OS_CYLINDER: +1.25
OD_SPHERE: -3.25
OD_CYLINDER: +1.50

## 2018-08-30 ASSESSMENT — VISUAL ACUITY
OS_CC: 20/30 -1
CORRECTION_TYPE: GLASSES
OD_SC: 20/70
OD_SC: 20/40
OS_CC: 20/20
OD_CC: 20/20
OS_SC+: -1
OS_SC: 20/30 -1
OS_SC: 20/40
OD_CC: 20/40
OD_CC+: -1
METHOD: SNELLEN - LINEAR

## 2018-08-30 ASSESSMENT — REFRACTION_MANIFEST
OS_SPHERE: -2.50
OS_AXIS: 108
OS_ADD: +2.50
OD_SPHERE: -3.00
OD_AXIS: 100
OD_CYLINDER: +1.50
OD_ADD: +2.50
OS_CYLINDER: +1.50

## 2018-08-30 ASSESSMENT — SLIT LAMP EXAM - LIDS
COMMENTS: BLEPHARITIS, DERMATOCHALASIS
COMMENTS: BLEPHARITIS, DERMATOCHALASIS

## 2018-08-30 ASSESSMENT — CONF VISUAL FIELD
OS_NORMAL: 1
OD_NORMAL: 1

## 2018-08-30 ASSESSMENT — EXTERNAL EXAM - LEFT EYE: OS_EXAM: NORMAL

## 2018-08-30 ASSESSMENT — CUP TO DISC RATIO
OS_RATIO: 0.4
OD_RATIO: 0.5

## 2018-08-30 ASSESSMENT — TONOMETRY: IOP_METHOD: APPLANATION

## 2018-08-30 ASSESSMENT — EXTERNAL EXAM - RIGHT EYE: OD_EXAM: NORMAL

## 2018-08-30 NOTE — LETTER
8/30/2018         RE: Austin CHEEMA Greg  [address] [place] [address]        Dear Colleague,    Thank you for referring your patient, Austin Garza, to the [facility]. Please see a copy of my visit note below.    Chief Complaint   Patient presents with     Eye Exam For Diabetes     Accompanied by self  Lab Results   Component Value Date    A1C 6.6 12/04/2017    A1C 6.3 11/16/2017    A1C 6.7 04/11/2017    A1C 6.2 08/10/2016    A1C 6.5 10/23/2015     X 10y  Last Eye Exam: 1.5 years ago  Dilated Previously: Yes    What are you currently using to see?  glasses    Distance Vision Acuity: Satisfied with vision    Near Vision Acuity: Satisfied with vision while reading  with glasses    Eye Comfort: good  Do you use eye drops? : No  Occupation or Hobbies: retired    Shira Nelson, [employer]     Medical, surgical and family histories reviewed and updated 8/30/2018.       OBJECTIVE: See Ophthalmology exam    ASSESSMENT:    ICD-10-CM    1. Myopia of both eyes with astigmatism H52.13     H52.203    2. Presbyopia H52.4    3. 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 Diabetes mellitus without ophthalmic manifestations (H) E11.9     change left eye    PLAN:  Discussed lid hygiene   Prescription updated  Austin Garza aware  eye exam results will be sent to Sandi Eastman.    Cristina Allen OD         Again, thank you for allowing me to participate in the care of your patient.        Sincerely,        Cristina Allen, OD

## 2018-08-30 NOTE — PROGRESS NOTES
Chief Complaint   Patient presents with     Eye Exam For Diabetes     Accompanied by self  Lab Results   Component Value Date    A1C 6.6 12/04/2017    A1C 6.3 11/16/2017    A1C 6.7 04/11/2017    A1C 6.2 08/10/2016    A1C 6.5 10/23/2015     X 10y  Last Eye Exam: 1.5 years ago  Dilated Previously: Yes    What are you currently using to see?  glasses    Distance Vision Acuity: Satisfied with vision    Near Vision Acuity: Satisfied with vision while reading  with glasses    Eye Comfort: good  Do you use eye drops? : No  Occupation or Hobbies: retired    Shira Nelson, [employer]     Medical, surgical and family histories reviewed and updated 8/30/2018.       OBJECTIVE: See Ophthalmology exam    ASSESSMENT:    ICD-10-CM    1. Myopia of both eyes with astigmatism H52.13     H52.203    2. Presbyopia H52.4    3. 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 Diabetes mellitus without ophthalmic manifestations (H) E11.9     change left eye    PLAN:  Discussed lid hygiene   Prescription updated  Austin Garza aware  eye exam results will be sent to Sandi Eastman.    Cristina Allen OD

## 2018-08-30 NOTE — PATIENT INSTRUCTIONS
Diabetes weakens the blood vessels all over the body, including the eyes. Damage to the blood vessels in the eyes can cause swelling or bleeding into part of the eye (called the retina). This is called diabetic retinopathy (ADITI-tin-AH-puh-thee). If not treated, this disease can cause vision loss or blindness.   Symptoms may include blurred or distorted vision, but many people have no symptoms. It's important to see your eye doctor regularly to check for problems.   Early treatment and good control can help protect your vision. Here are the things you can do to help prevent vision loss:      1. Keep your blood sugar levels under tight control.      2. Bring high blood pressure under control.      3. No smoking.      4. Have yearly dilated eye exams.       Overabundance of bacterial microorganisms along the eyelashes and lid margins induce stress on the tear film and promote inflammation.  Regular lid hygiene helps diminish the bacterial population to prevent inflammation and infection.  Use a warm compress to loosen crusts   Cleanse lids once daily with a lid cleansing product as directed such as Ocusoft or Sterilid which can be purchased at most pharmacies. Diluted baby shampoo will also work, but not as well.     Sometimes when the eyelid starts to droop there is a build up of bacteria under skin at base of eyelashes

## 2018-08-30 NOTE — MR AVS SNAPSHOT
After Visit Summary   8/30/2018    Austin Garza    MRN: [id number]           Patient Information     Date Of Birth          1940        Visit Information        Provider Department      8/30/2018 9:00 AM Cristina Allen OD [facility] [place]        Today's Diagnoses     Diabetes mellitus without ophthalmic manifestations (H)    -  1    Myopia of both eyes with astigmatism        Presbyopia        Nuclear sclerosis of both eyes          Care Instructions    Diabetes weakens the blood vessels all over the body, including the eyes. Damage to the blood vessels in the eyes can cause swelling or bleeding into part of the eye (called the retina). This is called diabetic retinopathy (ADITI-tin-AH-puh-thee). If not treated, this disease can cause vision loss or blindness.   Symptoms may include blurred or distorted vision, but many people have no symptoms. It's important to see your eye doctor regularly to check for problems.   Early treatment and good control can help protect your vision. Here are the things you can do to help prevent vision loss:      1. Keep your blood sugar levels under tight control.      2. Bring high blood pressure under control.      3. No smoking.      4. Have yearly dilated eye exams.       Overabundance of bacterial microorganisms along the eyelashes and lid margins induce stress on the tear film and promote inflammation.  Regular lid hygiene helps diminish the bacterial population to prevent inflammation and infection.  Use a warm compress to loosen crusts   Cleanse lids once daily with a lid cleansing product as directed such as Ocusoft or Sterilid which can be purchased at most pharmacies. Diluted baby shampoo will also work, but not as well.     Sometimes when the eyelid starts to droop there is a build up of bacteria under skin at base of eyelashes           Follow-ups after your visit        Follow-up notes from your care team     Return in about 1 year  (around 8/30/2019).      Your next 10 appointments already scheduled     Sep 11, 2018  8:10 AM CDT   LAB with EA LAB   [facility] [place] ([facility])    [address]  [address]  [place] [address] [phone]           Please do not eat 10-12 hours before your appointment if you are coming in fasting for labs on lipids, cholesterol, or glucose (sugar). This does not apply to pregnant women. Water, hot tea and black coffee (with nothing added) are okay. Do not drink other fluids, diet soda or chew gum.            Sep 12, 2018  8:50 AM CDT   Office Visit with Sandi Eastman MD   [facility] [place] ([facility])    [address]  [address]  [place] [address] [phone]           Bring a current list of meds and any records pertaining to this visit. For Physicals, please bring immunization records and any forms needing to be filled out. Please arrive 10 minutes early to complete paperwork.            Oct 24, 2018  8:30 AM CDT   Return Visit with RH ONCOLOGY NURSE   [facility]    [facility]  [address]  [facility] [address]   [phone]            Oct 25, 2018  9:45 AM CDT   Return Visit with Breana Perry MD   [facility]    [facility]  [address]  [facility] [address]   [phone]              Who to contact     If you have questions or need follow up information about today's clinic visit or your schedule please contact [facility] directly at [phone].  Normal or non-critical lab and imaging results will be communicated to you by MyChart, letter or phone within 4 business days after the clinic has received the results. If you do not hear from us within 7 days, please contact the clinic through MyChart or phone. If you have a  critical or abnormal lab result, we will notify you by phone as soon as possible.  Submit refill requests through "[employer] or call your pharmacy and they will forward the refill request to us. Please allow 3 business days for your refill to be completed.          Additional Information About Your Visit        [employer] Information     "[employer] gives you secure access to your electronic health record. If you see a primary care provider, you can also send messages to your care team and make appointments. If you have questions, please call your primary care clinic.  If you do not have a primary care provider, please call [phone] and they will assist you.        Care EveryWhere ID     This is your Care EveryWhere ID. This could be used by other organizations to access your [facility] medical records  [id number]         Blood Pressure from Last 3 Encounters:   08/23/18 149/84   07/19/18 133/78   07/17/18 133/82    Weight from Last 3 Encounters:   08/23/18 97.3 kg (214 lb 6.4 oz)   07/19/18 98 kg (216 lb)   05/17/18 97.1 kg (214 lb)              We Performed the Following     EYE EXAM (SIMPLE-NONBILLABLE)     REFRACTION        Primary Care Provider Office Phone # Fax #    Sandi Eastman [phone]       [address] [place] [address]        Equal Access to Services     BAKARI OSMAN AH: Hadii aad ku hadasho Soomaali, waaxda luqadaha, qaybta kaalmada adeegyada, mina pastor haygavino bennett . So [facility] [phone].    ATENCIÓN: Si habla español, tiene a edward disposición servicios gratuitos de asistencia lingüística. Llame al [phone].    We comply with applicable federal civil rights laws and [place] laws. We do not discriminate on the basis of race, color, national origin, age, disability, sex, sexual orientation, or gender identity.            Thank you!     [facility]. Our goal is always to provide you with excellent care.  Hearing back from our patients is one way we can continue to improve our services. Please take a few minutes to complete the written survey that you may receive in the mail after your visit with us. Thank you!             Your Updated Medication List - Protect others around you: Learn how to safely use, store and throw away your medicines at www.disposemymeds.org.          This list is accurate as of 8/30/18  9:44 AM.  Always use your most recent med list.                   Brand Name Dispense Instructions for use Diagnosis    aspirin 325 MG tablet     90 tablet    Take 1 tablet (325 mg) by mouth every evening    Abdominal aortic aneurysm (AAA) without rupture (H)       blood glucose monitoring meter device kit     1 kit    Use to test blood sugars 2-3 times daily as directed. Brand per insurance formulary    Type II or unspecified type diabetes mellitus without mention of complication, not stated as uncontrolled       blood glucose monitoring test strip    ONETOUCH ULTRA    100 each    Test 2-3 times daily as directed, brand per insurance formulary.    Type 2 diabetes mellitus without complication, without long-term current use of insulin (H)       CENTRUM SILVER per tablet      Take 1 tablet by mouth daily        fluticasone 50 MCG/ACT spray    FLONASE    1 Bottle    Spray 2 sprays into both nostrils 2 times daily    Viral URI with cough       glimepiride 1 MG tablet    AMARYL    90 tablet    Take 1 tablet (1 mg) by mouth every morning (before breakfast)    Type 2 diabetes mellitus without complication, without long-term current use of insulin (H)       LIPITOR PO      Take 20 mg by mouth At Bedtime        losartan-hydrochlorothiazide 50-12.5 MG per tablet    HYZAAR    90 tablet    Take 1 tablet by mouth daily    Hypertension goal BP (blood pressure) < 140/90       metFORMIN 1000 MG tablet    GLUCOPHAGE    180 tablet    Take 1 tablet (1,000 mg) by mouth 2 times daily (with meals)    Type 2 diabetes mellitus  without complication, without long-term current use of insulin (H)

## 2018-08-31 ENCOUNTER — TRANSFERRED RECORDS (OUTPATIENT)
Dept: MULTI SPECIALTY CLINIC | Facility: CLINIC | Age: 78
End: 2018-08-31

## 2018-08-31 LAB — RETINOPATHY: NORMAL

## 2018-09-11 ENCOUNTER — TELEPHONE (OUTPATIENT)
Dept: ONCOLOGY | Facility: CLINIC | Age: 78
End: 2018-09-11

## 2018-09-11 DIAGNOSIS — C91.10 CLL (CHRONIC LYMPHOCYTIC LEUKEMIA) (H): ICD-10-CM

## 2018-09-11 LAB
ALBUMIN SERPL-MCNC: 4.2 G/DL (ref 3.4–5)
ALP SERPL-CCNC: 57 U/L (ref 40–150)
ALT SERPL W P-5'-P-CCNC: 24 U/L (ref 0–70)
ANION GAP SERPL CALCULATED.3IONS-SCNC: 9 MMOL/L (ref 3–14)
ANISOCYTOSIS BLD QL SMEAR: SLIGHT
AST SERPL W P-5'-P-CCNC: 20 U/L (ref 0–45)
BILIRUB SERPL-MCNC: 0.7 MG/DL (ref 0.2–1.3)
BUN SERPL-MCNC: 25 MG/DL (ref 7–30)
CALCIUM SERPL-MCNC: 8.9 MG/DL (ref 8.5–10.1)
CHLORIDE SERPL-SCNC: 106 MMOL/L (ref 94–109)
CO2 SERPL-SCNC: 27 MMOL/L (ref 20–32)
CREAT SERPL-MCNC: 1.34 MG/DL (ref 0.66–1.25)
DIFFERENTIAL METHOD BLD: ABNORMAL
ERYTHROCYTE [DISTWIDTH] IN BLOOD BY AUTOMATED COUNT: 15 % (ref 10–15)
GFR SERPL CREATININE-BSD FRML MDRD: 52 ML/MIN/1.7M2
GLUCOSE SERPL-MCNC: 163 MG/DL (ref 70–99)
HCT VFR BLD AUTO: 29.7 % (ref 40–53)
HGB BLD-MCNC: 9.8 G/DL (ref 13.3–17.7)
LDH SERPL L TO P-CCNC: 204 U/L (ref 85–227)
LYMPHOCYTES # BLD AUTO: 51.4 10E9/L (ref 0.8–5.3)
LYMPHOCYTES NFR BLD AUTO: 87 %
MACROCYTES BLD QL SMEAR: PRESENT
MCH RBC QN AUTO: 35.1 PG (ref 26.5–33)
MCHC RBC AUTO-ENTMCNC: 33 G/DL (ref 31.5–36.5)
MCV RBC AUTO: 107 FL (ref 78–100)
MONOCYTES # BLD AUTO: 1.8 10E9/L (ref 0–1.3)
MONOCYTES NFR BLD AUTO: 3 %
NEUTROPHILS # BLD AUTO: 5.9 10E9/L (ref 1.6–8.3)
NEUTROPHILS NFR BLD AUTO: 10 %
PLATELET # BLD AUTO: 111 10E9/L (ref 150–450)
POTASSIUM SERPL-SCNC: 4.4 MMOL/L (ref 3.4–5.3)
PROT SERPL-MCNC: 7.3 G/DL (ref 6.8–8.8)
RBC # BLD AUTO: 2.79 10E12/L (ref 4.4–5.9)
SODIUM SERPL-SCNC: 142 MMOL/L (ref 133–144)
WBC # BLD AUTO: 59.1 10E9/L (ref 4–11)

## 2018-09-11 PROCEDURE — 80053 COMPREHEN METABOLIC PANEL: CPT | Performed by: INTERNAL MEDICINE

## 2018-09-11 PROCEDURE — 85025 COMPLETE CBC W/AUTO DIFF WBC: CPT | Performed by: INTERNAL MEDICINE

## 2018-09-11 PROCEDURE — 83615 LACTATE (LD) (LDH) ENZYME: CPT | Performed by: INTERNAL MEDICINE

## 2018-09-11 PROCEDURE — 36415 COLL VENOUS BLD VENIPUNCTURE: CPT | Performed by: INTERNAL MEDICINE

## 2018-09-11 NOTE — TELEPHONE ENCOUNTER
Cynthia from [facility] lab is calling with critical lab value:    WBC = 59,000    Cynthia states that she will have to send the sample to the hospital for the differential, so it could take until the end of the day for the differential results.   Will route to Dr Perry for review and advice.  Alysa Campos, RN, BSN, OCN

## 2018-09-11 NOTE — TELEPHONE ENCOUNTER
WBC actually trended down from the last check.  Hemoglobin and platelet count also stable from the last check.  No intervention needed at this time.  Continue to observe.  Please let patient know.

## 2018-09-11 NOTE — TELEPHONE ENCOUNTER
Pt has been notified with results and recommendations per Dr Perry. Patient verbalized understanding and agreement with plan.  Pt was instructed to call the clinic with any questions, concerns, or worsening symptoms.   Alysa Campos RN, BSN, OCN

## 2018-09-18 ENCOUNTER — DOCUMENTATION ONLY (OUTPATIENT)
Dept: OTHER | Facility: CLINIC | Age: 78
End: 2018-09-18

## 2018-10-08 ENCOUNTER — TRANSFERRED RECORDS (OUTPATIENT)
Dept: HEALTH INFORMATION MANAGEMENT | Facility: CLINIC | Age: 78
End: 2018-10-08

## 2018-10-08 DIAGNOSIS — E78.5 HYPERLIPEMIA: Primary | ICD-10-CM

## 2018-10-08 DIAGNOSIS — E11.9 TYPE 2 DIABETES MELLITUS WITHOUT COMPLICATION, WITHOUT LONG-TERM CURRENT USE OF INSULIN (H): Primary | ICD-10-CM

## 2018-10-08 DIAGNOSIS — E11.9 TYPE 2 DIABETES MELLITUS WITHOUT COMPLICATION, WITHOUT LONG-TERM CURRENT USE OF INSULIN (H): ICD-10-CM

## 2018-10-08 DIAGNOSIS — E78.5 HYPERLIPIDEMIA LDL GOAL <100: ICD-10-CM

## 2018-10-08 DIAGNOSIS — C91.10 CLL (CHRONIC LYMPHOCYTIC LEUKEMIA) (H): ICD-10-CM

## 2018-10-08 LAB
CHOLEST SERPL-MCNC: 110 MG/DL
HBA1C MFR BLD: 6.2 % (ref 0–5.6)
HDLC SERPL-MCNC: 32 MG/DL
LDLC SERPL CALC-MCNC: 55 MG/DL
NONHDLC SERPL-MCNC: 78 MG/DL
TRIGL SERPL-MCNC: 114 MG/DL

## 2018-10-08 PROCEDURE — 80061 LIPID PANEL: CPT | Performed by: INTERNAL MEDICINE

## 2018-10-08 PROCEDURE — 83036 HEMOGLOBIN GLYCOSYLATED A1C: CPT | Performed by: INTERNAL MEDICINE

## 2018-10-08 PROCEDURE — 36415 COLL VENOUS BLD VENIPUNCTURE: CPT | Performed by: INTERNAL MEDICINE

## 2018-10-09 NOTE — PROGRESS NOTES
"  SUBJECTIVE:   Austin Garza is a 78 year old male who presents to clinic today for the following health issues:      Diabetes Follow-up      Patient is checking blood sugars: rarely around 150     Diabetic concerns: None     Symptoms of hypoglycemia (low blood sugar): none     Paresthesias (numbness or burning in feet) or sores: Yes - \" hot at night\"      Date of last diabetic eye exam: x1-2 months ago     Diabetes Management Resources    Hyperlipidemia Follow-Up      Rate your low fat/cholesterol diet?: good    Taking statin?  Yes, no muscle aches from statin    Other lipid medications/supplements?:  none    Hypertension Follow-up      Outpatient blood pressures are being checked periodically     Low Salt Diet: no added salt    BP Readings from Last 2 Encounters:   08/23/18 149/84   07/19/18 133/78     Hemoglobin A1C (%)   Date Value   10/08/2018 6.2 (H)   12/04/2017 6.6 (H)     LDL Cholesterol Calculated (mg/dL)   Date Value   10/08/2018 55   12/04/2017 56       Amount of exercise or physical activity: 2-3 days/week for an average of 45-60 minutes    Problems taking medications regularly: No    Medication side effects: none    Diet: regular (no restrictions), low salt and low fat/cholesterol      Austin comes in for follow-up of his diabetes and hypertension. He reports that he is tolerating his medications well and without issues. He did miss his taking his medications for 5 days recently because [place] Is not regularly checking his blood sugars. Exercises on a stationary bike, but isn't able to swim like he used to. Denies any issues with nausea, vomiting, chest pain or significant shortness of breath. Overall has been feeling well.     Follows with Heme Onc for his CLL, and is doing well and following regularly with vascular surgery for his AAA s/p EVAR.     Problem list and histories reviewed & adjusted, as indicated.  Additional history: as documented    Patient Active " Problem List   Diagnosis     Hearing loss     Dysfunction of eustachian tube     Hypertension goal BP (blood pressure) < 140/90     Diminished Peripheral Pulse     Type 2 diabetes mellitus without complication, without long-term current use of insulin (H)     HYPERLIPIDEMIA LDL GOAL <100     Inflammatory Monoarthritis, Left Hip     Esophageal reflux     Overweight - BMI >35     BCC (basal cell carcinoma), face     Skin lesion of back     CLL (chronic lymphocytic leukemia) (H)     Abdominal aortic aneurysm (AAA) without rupture (H)     Past Surgical History:   Procedure Laterality Date     APPENDECTOMY  1966     BONE MARROW BIOPSY, BONE SPECIMEN, NEEDLE/TROCAR N/A 11/21/2017    Procedure: BIOPSY BONE MARROW;  BONE MARROW BIOPSY;  Surgeon: Shady Garcia MD;  Location:  GI     COLONOSCOPY       ENDOVASCULAR REPAIR ANEURYSM ABDOMINAL AORTA N/A 12/4/2017    Procedure: ENDOVASCULAR REPAIR ANEURYSM ABDOMINAL AORTA;  ENDOVASCULAR REPAIR ANEURYSM ABDOMINAL AORTA;  Surgeon: Milton Cazares MD;  Location:  OR     SURGICAL HISTORY OF -   1985-90    Squamous cell skin cancer resection - back     SURGICAL HISTORY OF -   1998    skin tags resection     SURGICAL HISTORY OF -   2/2001    stress thall. normal     SURGICAL HISTORY OF -   5/2002    colonoscopy     SURGICAL HISTORY OF -   2007    Fistulotomy with marsupialization for repair of fisture in ano       Social History   Substance Use Topics     Smoking status: Former Smoker     Packs/day: 0.50     Years: 20.00     Quit date: 1/1/1975     Smokeless tobacco: Former User     Alcohol use 6.0 - 8.4 oz/week     10 - 14 Standard drinks or equivalent per week      Comment: 2 drinks a week     Family History   Problem Relation Age of Onset     Cerebrovascular Disease Father      x 2     Hypertension Mother      C.A.D. Mother      Cancer Mother      skin     Eye Disorder Mother      glaucoma     Prostate Cancer No family hx of      Cancer - colorectal No family hx  "of      Glaucoma No family hx of      Macular Degeneration No family hx of            Reviewed and updated as needed this visit by clinical staff  Tobacco  Allergies  Meds  Med Hx  Fam Hx  Soc Hx        ROS:  Constitutional, HEENT, cardiovascular, pulmonary, gi and gu systems are negative, except as otherwise noted.    OBJECTIVE:     /78 (BP Location: Right arm, Patient Position: Chair, Cuff Size: Adult Large)  Pulse 59  Temp 97.1  F (36.2  C) (Tympanic)  Ht 5' 8\" (1.727 m)  Wt 210 lb 5 oz (95.4 kg)  SpO2 97%  BMI 31.98 kg/m2  Body mass index is 31.98 kg/(m^2).  GENERAL: healthy, alert and no distress  EYES: Eyes grossly normal to inspection, PERRL and conjunctivae and sclerae normal  HENT: ear canals and TM's normal, nose and mouth without ulcers or lesions  NECK: no adenopathy, no asymmetry, masses, or scars and thyroid normal to palpation  RESP: lungs clear to auscultation - no rales, rhonchi or wheezes  CV: regular rate and rhythm, normal S1 S2, no S3 or S4, no murmur, click or rub, no peripheral edema and peripheral pulses strong  ABDOMEN: soft, nontender, no hepatosplenomegaly appreciated on my exam, no masses and bowel sounds normal  Diabetic foot exam: normal DP and PT pulses, no trophic changes or ulcerative lesions, normal sensory exam and normal monofilament exam    Diagnostic Test Results:  Results for orders placed or performed in visit on 10/08/18   Hemoglobin A1c   Result Value Ref Range    Hemoglobin A1C 6.2 (H) 0 - 5.6 %   Lipid panel reflex to direct LDL Fasting   Result Value Ref Range    Cholesterol 110 <200 mg/dL    Triglycerides 114 <150 mg/dL    HDL Cholesterol 32 (L) >39 mg/dL    LDL Cholesterol Calculated 55 <100 mg/dL    Non HDL Cholesterol 78 <130 mg/dL       ASSESSMENT/PLAN:     1. Hypertension goal BP (blood pressure) < 140/90  Well controlled on current regimen, labs up to date. Medications refilled.   - losartan-hydrochlorothiazide (HYZAAR) 50-12.5 MG per tablet; Take " 1 tablet by mouth daily  Dispense: 90 tablet; Refill: 3    2. Type 2 diabetes mellitus without complication, without long-term current use of insulin (H)  Well controlled on current regimen; may be able to stop glimepiride in future if he continues to have good control. Medications refilled, follow-up in 6 months.   - metFORMIN (GLUCOPHAGE) 1000 MG tablet; Take 1 tablet (1,000 mg) by mouth 2 times daily (with meals)  Dispense: 180 tablet; Refill: 3  - glimepiride (AMARYL) 1 MG tablet; Take 1 tablet (1 mg) by mouth every morning (before breakfast)  Dispense: 90 tablet; Refill: 3  - atorvastatin (LIPITOR) 20 MG tablet; Take 1 tablet (20 mg) by mouth At Bedtime  Dispense: 90 tablet; Refill: 3  - FOOT EXAM  - Hemoglobin A1c; Future  - Albumin Random Urine Quantitative with Creat Ratio; Future    3. CLL (chronic lymphocytic leukemia) (H)  Stable, has night sweats. Follows regularly with Heme/Onc.     4. Abdominal aortic aneurysm (AAA) without rupture (H)  S/p EVAR. Doing well, follows with Vascular.     5. Screening for prostate cancer  - PSA, screen; Future    6. Need for prophylactic vaccination and inoculation against influenza  - FLU VACCINE, INCREASED ANTIGEN, PRESV FREE, AGE 65+ [29831]  - Vaccine Administration, Initial [57210]    Patient Instructions   Diabetes looks excellent! No changes to medications at this time. If your numbers continue to look this good, we may stop glimepiride next time.    Continue all medications without changes.     Follow-up in 6 months with me for next diabetic check.    We will also check prostate levels with next lab draw.    Ask Dr. Perry if you can get the new shingles vaccine.       Sandi Nguyen MD  [facility]    Injectable Influenza Immunization Documentation    1.  Is the person to be vaccinated sick today?   No    2. Does the person to be vaccinated have an allergy to a component   of the vaccine?   No  Egg Allergy Algorithm Link    3. Has the person to be  vaccinated ever had a serious reaction   to influenza vaccine in the past?   No    4. Has the person to be vaccinated ever had Guillain-Barré syndrome?   No    Form completed by Meena Armendariz MA 10/10/2018 8:27 AM

## 2018-10-10 ENCOUNTER — OFFICE VISIT (OUTPATIENT)
Dept: PEDIATRICS | Facility: CLINIC | Age: 78
End: 2018-10-10
Payer: COMMERCIAL

## 2018-10-10 VITALS
OXYGEN SATURATION: 97 % | DIASTOLIC BLOOD PRESSURE: 78 MMHG | TEMPERATURE: 97.1 F | HEIGHT: 68 IN | HEART RATE: 59 BPM | WEIGHT: 210.31 LBS | SYSTOLIC BLOOD PRESSURE: 134 MMHG | BODY MASS INDEX: 31.87 KG/M2

## 2018-10-10 DIAGNOSIS — I10 HYPERTENSION GOAL BP (BLOOD PRESSURE) < 140/90: Primary | ICD-10-CM

## 2018-10-10 DIAGNOSIS — Z12.5 SCREENING FOR PROSTATE CANCER: ICD-10-CM

## 2018-10-10 DIAGNOSIS — I71.40 ABDOMINAL AORTIC ANEURYSM (AAA) WITHOUT RUPTURE (H): ICD-10-CM

## 2018-10-10 DIAGNOSIS — E11.9 TYPE 2 DIABETES MELLITUS WITHOUT COMPLICATION, WITHOUT LONG-TERM CURRENT USE OF INSULIN (H): ICD-10-CM

## 2018-10-10 DIAGNOSIS — Z23 NEED FOR PROPHYLACTIC VACCINATION AND INOCULATION AGAINST INFLUENZA: ICD-10-CM

## 2018-10-10 DIAGNOSIS — C91.10 CLL (CHRONIC LYMPHOCYTIC LEUKEMIA) (H): ICD-10-CM

## 2018-10-10 PROCEDURE — 99214 OFFICE O/P EST MOD 30 MIN: CPT | Mod: 25 | Performed by: INTERNAL MEDICINE

## 2018-10-10 PROCEDURE — 99207 C FOOT EXAM  NO CHARGE: CPT | Performed by: INTERNAL MEDICINE

## 2018-10-10 PROCEDURE — 90662 IIV NO PRSV INCREASED AG IM: CPT | Performed by: INTERNAL MEDICINE

## 2018-10-10 PROCEDURE — G0008 ADMIN INFLUENZA VIRUS VAC: HCPCS | Performed by: INTERNAL MEDICINE

## 2018-10-10 RX ORDER — ATORVASTATIN CALCIUM 20 MG/1
20 TABLET, FILM COATED ORAL AT BEDTIME
Qty: 90 TABLET | Refills: 3 | Status: SHIPPED | OUTPATIENT
Start: 2018-10-10 | End: 2019-01-18

## 2018-10-10 RX ORDER — GLIMEPIRIDE 1 MG/1
1 TABLET ORAL
Qty: 90 TABLET | Refills: 3 | Status: SHIPPED | OUTPATIENT
Start: 2018-10-10 | End: 2019-01-18

## 2018-10-10 RX ORDER — LOSARTAN POTASSIUM AND HYDROCHLOROTHIAZIDE 12.5; 5 MG/1; MG/1
1 TABLET ORAL DAILY
Qty: 90 TABLET | Refills: 3 | Status: SHIPPED | OUTPATIENT
Start: 2018-10-10 | End: 2019-01-18

## 2018-10-10 ASSESSMENT — ANXIETY QUESTIONNAIRES
5. BEING SO RESTLESS THAT IT IS HARD TO SIT STILL: NOT AT ALL
3. WORRYING TOO MUCH ABOUT DIFFERENT THINGS: NOT AT ALL
1. FEELING NERVOUS, ANXIOUS, OR ON EDGE: NOT AT ALL
2. NOT BEING ABLE TO STOP OR CONTROL WORRYING: NOT AT ALL
GAD7 TOTAL SCORE: 0
7. FEELING AFRAID AS IF SOMETHING AWFUL MIGHT HAPPEN: NOT AT ALL
6. BECOMING EASILY ANNOYED OR IRRITABLE: NOT AT ALL
IF YOU CHECKED OFF ANY PROBLEMS ON THIS QUESTIONNAIRE, HOW DIFFICULT HAVE THESE PROBLEMS MADE IT FOR YOU TO DO YOUR WORK, TAKE CARE OF THINGS AT HOME, OR GET ALONG WITH OTHER PEOPLE: NOT DIFFICULT AT ALL

## 2018-10-10 ASSESSMENT — PATIENT HEALTH QUESTIONNAIRE - PHQ9: 5. POOR APPETITE OR OVEREATING: NOT AT ALL

## 2018-10-10 NOTE — PATIENT INSTRUCTIONS
Diabetes looks excellent! No changes to medications at this time. If your numbers continue to look this good, we may stop glimepiride next time.    Continue all medications without changes.     Follow-up in 6 months with me for next diabetic check.    We will also check prostate levels with next lab draw.    Ask Dr. Perry if you can get the new shingles vaccine.

## 2018-10-10 NOTE — MR AVS SNAPSHOT
After Visit Summary   10/10/2018    Austin Garza    MRN: [id number]           Patient Information     Date Of Birth          1940        Visit Information        Provider Department      10/10/2018 8:30 AM Sandi Eastman MD [facility]        Today's Diagnoses     Need for prophylactic vaccination and inoculation against influenza    -  1    Hypertension goal BP (blood pressure) < 140/90        Type 2 diabetes mellitus without complication, without long-term current use of insulin (H)        Screening for prostate cancer          Care Instructions    Diabetes looks excellent! No changes to medications at this time. If your numbers continue to look this good, we may stop glimepiride next time.    Continue all medications without changes.     Follow-up in 6 months with me for next diabetic check.    We will also check prostate levels with next lab draw.    Ask Dr. Perry if you can get the new shingles vaccine.           Follow-ups after your visit        Your next 10 appointments already scheduled     Oct 24, 2018  8:30 AM CDT   Return Visit with  ONCOLOGY NURSE   [facility]    [facility]  [address] [place] [address] [address]  [facility] [address]   [phone]            Oct 25, 2018  9:45 AM CDT   Return Visit with Breana Perry MD   [facility]    [facility]  [address] [place] [address] [address]  [facility] [address]   [phone]              Future tests that were ordered for you today     Open Future Orders        Priority Expected Expires Ordered    PSA, screen Routine  10/10/2019 10/10/2018    Hemoglobin A1c Routine  10/10/2019 10/10/2018    Albumin Random Urine Quantitative with Creat Ratio Routine  10/10/2019 10/10/2018            Who to contact     If you have questions or need follow up information about today's clinic visit or  "your schedule please contact [facility] directly at [phone].  Normal or non-critical lab and imaging results will be communicated to you by MyChart, letter or phone within 4 business days after the clinic has received the results. If you do not hear from us within 7 days, please contact the clinic through [employer] or phone. If you have a critical or abnormal lab result, we will notify you by phone as soon as possible.  Submit refill requests through [employer] or call your pharmacy and they will forward the refill request to us. Please allow 3 business days for your refill to be completed.          Additional Information About Your Visit        [employer] Information     [employer] gives you secure access to your electronic health record. If you see a primary care provider, you can also send messages to your care team and make appointments. If you have questions, please call your primary care clinic.  If you do not have a primary care provider, please call [phone] and they will assist you.        Care EveryWhere ID     This is your Care EveryWhere ID. This could be used by other organizations to access your [place] medical records  [id number]        Your Vitals Were     Pulse Temperature Height Pulse Oximetry BMI (Body Mass Index)       59 97.1  F (36.2  C) (Tympanic) 5' 8\" (1.727 m) 97% 31.98 kg/m2        Blood Pressure from Last 3 Encounters:   10/10/18 134/78   08/23/18 149/84   07/19/18 133/78    Weight from Last 3 Encounters:   10/10/18 210 lb 5 oz (95.4 kg)   08/23/18 214 lb 6.4 oz (97.3 kg)   07/19/18 216 lb (98 kg)              We Performed the Following     FLU VACCINE, INCREASED ANTIGEN, PRESV FREE, AGE 65+ [68977]     FOOT EXAM     Vaccine Administration, Initial [77230]          Today's Medication Changes          These changes are accurate as of 10/10/18  9:10 AM.  If you have any questions, ask your nurse or doctor.               These medicines have changed or have updated " prescriptions.        Dose/Directions    atorvastatin 20 MG tablet   Commonly known as:  LIPITOR   This may have changed:  medication strength   Used for:  Type 2 diabetes mellitus without complication, without long-term current use of insulin (H)   Changed by:  Sandi Eastman MD        Dose:  20 mg   Take 1 tablet (20 mg) by mouth At Bedtime   Quantity:  90 tablet   Refills:  3            Where to get your medicines      These medications were sent to [facility] - [facility] [address] [facility]  [address] [facility], [facility] [address]     Phone:  [phone]     atorvastatin 20 MG tablet    glimepiride 1 MG tablet    losartan-hydrochlorothiazide 50-12.5 MG per tablet    metFORMIN 1000 MG tablet                Primary Care Provider Office Phone # Fax #    Sandi Eastman [phone] [address] [place] [address]        Equal Access to Services     [facility]: Hadii gertrude simon hadasho Soomaali, waaxda luqadaha, qaybta kaalmada adeegyada, mina pastor haygavino bennett . So [facility] [place] [facility] [phone].    ATENCIÓN: Si habla español, tiene a edward disposición servicios gratuitos de asistencia lingüística. [facility] [phone].    We comply with applicable federal civil rights laws and [place] laws. We do not discriminate on the basis of race, color, national origin, age, disability, sex, sexual orientation, or gender identity.            Thank you!     Thank you for choosing [facility]  for your care. Our goal is always to provide you with excellent care. Hearing back from our patients is one way we can continue to improve our services. Please take a few minutes to complete the written survey that you may receive in the mail after your visit with us. Thank you!             Your Updated Medication List - Protect others around you: Learn how to safely use, store and throw away your medicines at www.disposemymeds.org.          This list is  accurate as of 10/10/18  9:10 AM.  Always use your most recent med list.                   Brand Name Dispense Instructions for use Diagnosis    aspirin 325 MG tablet     90 tablet    Take 1 tablet (325 mg) by mouth every evening    Abdominal aortic aneurysm (AAA) without rupture (H)       atorvastatin 20 MG tablet    LIPITOR    90 tablet    Take 1 tablet (20 mg) by mouth At Bedtime    Type 2 diabetes mellitus without complication, without long-term current use of insulin (H)       blood glucose monitoring meter device kit     1 kit    Use to test blood sugars 2-3 times daily as directed. Brand per insurance formulary    Type II or unspecified type diabetes mellitus without mention of complication, not stated as uncontrolled       blood glucose monitoring test strip    ONETOUCH ULTRA    100 each    Test 2-3 times daily as directed, brand per insurance formulary.    Type 2 diabetes mellitus without complication, without long-term current use of insulin (H)       CENTRUM SILVER per tablet      Take 1 tablet by mouth daily        fluticasone 50 MCG/ACT spray    FLONASE    1 Bottle    Spray 2 sprays into both nostrils 2 times daily    Viral URI with cough       glimepiride 1 MG tablet    AMARYL    90 tablet    Take 1 tablet (1 mg) by mouth every morning (before breakfast)    Type 2 diabetes mellitus without complication, without long-term current use of insulin (H)       losartan-hydrochlorothiazide 50-12.5 MG per tablet    HYZAAR    90 tablet    Take 1 tablet by mouth daily    Hypertension goal BP (blood pressure) < 140/90       metFORMIN 1000 MG tablet    GLUCOPHAGE    180 tablet    Take 1 tablet (1,000 mg) by mouth 2 times daily (with meals)    Type 2 diabetes mellitus without complication, without long-term current use of insulin (H)

## 2018-10-11 PROBLEM — D72.829 LEUKOCYTOSIS: Status: RESOLVED | Noted: 2017-11-16 | Resolved: 2018-10-11

## 2018-10-11 ASSESSMENT — ANXIETY QUESTIONNAIRES: GAD7 TOTAL SCORE: 0

## 2018-10-11 ASSESSMENT — PATIENT HEALTH QUESTIONNAIRE - PHQ9: SUM OF ALL RESPONSES TO PHQ QUESTIONS 1-9: 4

## 2018-10-24 ENCOUNTER — ONCOLOGY VISIT (OUTPATIENT)
Dept: ONCOLOGY | Facility: CLINIC | Age: 78
End: 2018-10-24
Attending: INTERNAL MEDICINE
Payer: MEDICARE

## 2018-10-24 ENCOUNTER — HOSPITAL ENCOUNTER (OUTPATIENT)
Facility: CLINIC | Age: 78
Setting detail: SPECIMEN
Discharge: HOME | End: 2018-10-24
Attending: INTERNAL MEDICINE | Admitting: INTERNAL MEDICINE
Payer: MEDICARE

## 2018-10-24 DIAGNOSIS — C91.10 CLL (CHRONIC LYMPHOCYTIC LEUKEMIA) (H): ICD-10-CM

## 2018-10-24 LAB
ANISOCYTOSIS BLD QL SMEAR: SLIGHT
BASOPHILS # BLD AUTO: 0 10E9/L (ref 0–0.2)
BASOPHILS NFR BLD AUTO: 0 %
DACRYOCYTES BLD QL SMEAR: SLIGHT
DIFFERENTIAL METHOD BLD: ABNORMAL
EOSINOPHIL # BLD AUTO: 0.4 10E9/L (ref 0–0.7)
EOSINOPHIL NFR BLD AUTO: 0.5 %
ERYTHROCYTE [DISTWIDTH] IN BLOOD BY AUTOMATED COUNT: 15.5 % (ref 10–15)
HCT VFR BLD AUTO: 32.1 % (ref 40–53)
HGB BLD-MCNC: 10.2 G/DL (ref 13.3–17.7)
LYMPHOCYTES # BLD AUTO: 64.3 10E9/L (ref 0.8–5.3)
LYMPHOCYTES NFR BLD AUTO: 90 %
MACROCYTES BLD QL SMEAR: PRESENT
MCH RBC QN AUTO: 34.6 PG (ref 26.5–33)
MCHC RBC AUTO-ENTMCNC: 31.8 G/DL (ref 31.5–36.5)
MCV RBC AUTO: 109 FL (ref 78–100)
MICROCYTES BLD QL SMEAR: PRESENT
MONOCYTES # BLD AUTO: 2.1 10E9/L (ref 0–1.3)
MONOCYTES NFR BLD AUTO: 3 %
NEUTROPHILS # BLD AUTO: 4.6 10E9/L (ref 1.6–8.3)
NEUTROPHILS NFR BLD AUTO: 6.5 %
PLATELET # BLD AUTO: 117 10E9/L (ref 150–450)
PLATELET # BLD EST: ABNORMAL 10*3/UL
POIKILOCYTOSIS BLD QL SMEAR: SLIGHT
RBC # BLD AUTO: 2.95 10E12/L (ref 4.4–5.9)
SMUDGE CELLS BLD QL SMEAR: PRESENT
WBC # BLD AUTO: 71.4 10E9/L (ref 4–11)

## 2018-10-24 PROCEDURE — 85025 COMPLETE CBC W/AUTO DIFF WBC: CPT | Performed by: INTERNAL MEDICINE

## 2018-10-24 PROCEDURE — 36415 COLL VENOUS BLD VENIPUNCTURE: CPT

## 2018-10-24 NOTE — MR AVS SNAPSHOT
After Visit Summary   10/24/2018    Austin Garza    MRN: [id number]           Patient Information     Date Of Birth          1940        Visit Information        Provider Department      10/24/2018 8:30 AM  ONCOLOGY NURSE [facility]        Today's Diagnoses     CLL (chronic lymphocytic leukemia) (H)           Follow-ups after your visit        Your next 10 appointments already scheduled     Oct 25, 2018  9:45 AM CDT   Return Visit with Breana Perry MD   [facility]    [facility]  [address]  [facility] [id number] [phone]              Who to contact     If you have questions or need follow up information about today's clinic visit or your schedule please contact [facility] directly at [phone].  Normal or non-critical lab and imaging results will be communicated to you by [employer], letter or phone within 4 business days after the clinic has received the results. If you do not hear from us within 7 days, please contact the clinic through [employer] or phone. If you have a critical or abnormal lab result, we will notify you by phone as soon as possible.  Submit refill requests through [employer] or call your pharmacy and they will forward the refill request to us. Please allow 3 business days for your refill to be completed.          Additional Information About Your Visit        MyChart Information     [employer] gives you secure access to your electronic health record. If you see a primary care provider, you can also send messages to your care team and make appointments. If you have questions, please call your primary care clinic.  If you do not have a primary care provider, please call [phone] and they will assist you.        Care EveryWhere ID     This is your Care EveryWhere ID. This could be used by other organizations to access your  [place] medical records  [id number]         Blood Pressure from Last 3 Encounters:   10/10/18 134/78   08/23/18 149/84   07/19/18 133/78    Weight from Last 3 Encounters:   10/10/18 95.4 kg (210 lb 5 oz)   08/23/18 97.3 kg (214 lb 6.4 oz)   07/19/18 98 kg (216 lb)              We Performed the Following     CBC with platelets differential        Primary Care Provider Office Phone # Fax #    Sandi Eastman [phone] [address] [place] [address]        Equal Access to Services     [facility]: Hadii aad ku hadasho Sowillianali, waaxda luqadaha, qaybta kaalmada adesheriyada, mina bennett . So [facility] [phone].    ATENCIÓN: Si habla español, tiene a edward disposición servicios gratuitos de asistencia lingüística. Llame al [phone].    We comply with applicable federal civil rights laws and [place] laws. We do not discriminate on the basis of race, color, national origin, age, disability, sex, sexual orientation, or gender identity.            Thank you!     Thank you for choosing [facility]  for your care. Our goal is always to provide you with excellent care. Hearing back from our patients is one way we can continue to improve our services. Please take a few minutes to complete the written survey that you may receive in the mail after your visit with us. Thank you!             Your Updated Medication List - Protect others around you: Learn how to safely use, store and throw away your medicines at www.disposemymeds.org.          This list is accurate as of 10/24/18  8:49 AM.  Always use your most recent med list.                   Brand Name Dispense Instructions for use Diagnosis    aspirin 325 MG tablet     90 tablet    Take 1 tablet (325 mg) by mouth every evening    Abdominal aortic aneurysm (AAA) without rupture (H)       atorvastatin 20 MG tablet    LIPITOR    90 tablet    Take 1 tablet (20 mg) by mouth At Bedtime     Type 2 diabetes mellitus without complication, without long-term current use of insulin (H)       blood glucose monitoring meter device kit     1 kit    Use to test blood sugars 2-3 times daily as directed. Brand per insurance formulary    Type II or unspecified type diabetes mellitus without mention of complication, not stated as uncontrolled       blood glucose monitoring test strip    ONETOUCH ULTRA    100 each    Test 2-3 times daily as directed, brand per insurance formulary.    Type 2 diabetes mellitus without complication, without long-term current use of insulin (H)       CENTRUM SILVER per tablet      Take 1 tablet by mouth daily        fluticasone 50 MCG/ACT spray    FLONASE    1 Bottle    Spray 2 sprays into both nostrils 2 times daily    Viral URI with cough       glimepiride 1 MG tablet    AMARYL    90 tablet    Take 1 tablet (1 mg) by mouth every morning (before breakfast)    Type 2 diabetes mellitus without complication, without long-term current use of insulin (H)       losartan-hydrochlorothiazide 50-12.5 MG per tablet    HYZAAR    90 tablet    Take 1 tablet by mouth daily    Hypertension goal BP (blood pressure) < 140/90       metFORMIN 1000 MG tablet    GLUCOPHAGE    180 tablet    Take 1 tablet (1,000 mg) by mouth 2 times daily (with meals)    Type 2 diabetes mellitus without complication, without long-term current use of insulin (H)

## 2018-10-24 NOTE — NURSING NOTE
Medical Assistant Note:  Austin ANETTE Garza presents today for blood draw.    Patient seen by provider today: No.   present during visit today: Not Applicable.    Concerns: No Concerns.    Procedure:  Lab draw site: right arm, Needle type: butterfly, Gauge: 21.    Post Assessment:  Labs drawn without difficulty: Yes.    Discharge Plan:  Departure Mode: Ambulatory.    Face to Face Time: 10.    Kimberley Luna CMA

## 2018-10-24 NOTE — LETTER
10/24/2018         RE: Austin Garza  [address] [place] [address]        Dear Colleague,    Thank you for referring your patient, Austin Garza, to the [facility]. Please see a copy of my visit note below.    See Nursing Notes     Again, thank you for allowing me to participate in the care of your patient.        Sincerely,        [facility] Oncology Nurse

## 2018-10-25 ENCOUNTER — ONCOLOGY VISIT (OUTPATIENT)
Dept: ONCOLOGY | Facility: CLINIC | Age: 78
End: 2018-10-25
Attending: INTERNAL MEDICINE
Payer: MEDICARE

## 2018-10-25 VITALS
DIASTOLIC BLOOD PRESSURE: 83 MMHG | WEIGHT: 213 LBS | HEIGHT: 68 IN | SYSTOLIC BLOOD PRESSURE: 142 MMHG | BODY MASS INDEX: 32.28 KG/M2 | TEMPERATURE: 97 F | OXYGEN SATURATION: 97 % | HEART RATE: 107 BPM | RESPIRATION RATE: 16 BRPM

## 2018-10-25 DIAGNOSIS — C91.10 CLL (CHRONIC LYMPHOCYTIC LEUKEMIA) (H): Primary | ICD-10-CM

## 2018-10-25 PROCEDURE — G0463 HOSPITAL OUTPT CLINIC VISIT: HCPCS

## 2018-10-25 PROCEDURE — 99213 OFFICE O/P EST LOW 20 MIN: CPT | Performed by: INTERNAL MEDICINE

## 2018-10-25 ASSESSMENT — PAIN SCALES - GENERAL: PAINLEVEL: NO PAIN (0)

## 2018-10-25 NOTE — LETTER
10/25/2018         RE: Austin Garza  [address] [place] [address]        Dear Colleague,    Thank you for referring your patient, Austin Garza, to the [facility]. Please see a copy of my visit note below.    [facility]    Hematology/Oncology Established Patient Note      Today's Date: 10/25/18    Reason for Follow-up: CLL      HISTORY OF PRESENT ILLNESS: Austin Garza is a 78 year old male with PMHx of DMII, HTN who presented with leukocytosis.  In November 2017, he was found to have elevated WBC of 61.7K, hemoglobin of 10.4, and platelet of 115K.  There were no other CBC results available between 2010 and 2017.  Prior to 2010, he had normal CBC, with normal to mild anemia, and mild thrombocytopenia.   He was asymptomatic.    He underwent bone marrow biopsy on 11/21/17, which was consistent with chronic lymphocytic leukemia/small lymphocytic lymphoma, with 13% ringed sideroblasts identified.  The presence of increased numbers of ringed sideroblasts raises the possibility of an associated myelodysplastic syndrome.  Dysplastic changes are not identified though.  Many cases exhibiting ringed sideroblasts are metabolic origin, without significant risk of progression to acute leukemia, and these typically do not show dysplastic morphology as is the case with this specimen.  15% ringed sideroblasts are required for a diagnosis for RARS, which this specimen does not meet.  Flow cytometry is also consistent with CLL/SLL.  Cytogenetics show two (10%) of the metaphase cells comprise a clone characterized by a deletion within the proximal long arm of a chromosome 13 as the sole karyotypic abnormality.  Three (15%) metaphases had loss of the Y chromosome as the sole abnormality.    CT scan on 11/29/17 shows mildly enlarged left axillary lymph node and periaortic lymph nodes in the retroperitoneum of the abdomen.  Spleen is also enlarged.    On his staging  CT scan for CLL, he was incidentally found to have a large infrarenal abdominal aortic aneurysm, measuring 7.6 cm.  He was seen by Dr. Thomas, and he underwent EVAR on 12/4/17.       INTERIM HISTORY: Austin is here for follow-up today.  He says that he feels well, about the same.  No new complaints.      REVIEW OF SYSTEMS:   14 point ROS was reviewed and is negative other than as noted above in HPI.       HOME MEDICATIONS:  Current Outpatient Prescriptions   Medication Sig Dispense Refill     aspirin 325 MG tablet Take 1 tablet (325 mg) by mouth every evening 90 tablet 3     atorvastatin (LIPITOR) 20 MG tablet Take 1 tablet (20 mg) by mouth At Bedtime 90 tablet 3     blood glucose monitoring (ONE TOUCH ULTRA) test strip Test 2-3 times daily as directed, brand per insurance formulary. 100 each 0     Blood Glucose Monitoring Suppl (ONE TOUCH ULTRA 2) W/DEVICE KIT Use to test blood sugars 2-3 times daily as directed. Brand per insurance formulary   1 kit 0     fluticasone (FLONASE) 50 MCG/ACT spray Spray 2 sprays into both nostrils 2 times daily 1 Bottle 11     glimepiride (AMARYL) 1 MG tablet Take 1 tablet (1 mg) by mouth every morning (before breakfast) 90 tablet 3     losartan-hydrochlorothiazide (HYZAAR) 50-12.5 MG per tablet Take 1 tablet by mouth daily 90 tablet 3     metFORMIN (GLUCOPHAGE) 1000 MG tablet Take 1 tablet (1,000 mg) by mouth 2 times daily (with meals) 180 tablet 3     Multiple Vitamins-Minerals (CENTRUM SILVER) per tablet Take 1 tablet by mouth daily           ALLERGIES:  Allergies   Allergen Reactions     Ace Inhibitors      cough         PAST MEDICAL HISTORY:  Past Medical History:   Diagnosis Date     Diaphragmatic hernia without mention of obstruction or gangrene      Diverticulitis of colon (without mention of hemorrhage)(562.11)      Esophageal reflux      Irritable bowel syndrome      Macular degeneration (senile) of retina, unspecified      Squamous Cell Carcinoma, Back 1988     Type II or  unspecified type diabetes mellitus without mention of complication, not stated as uncontrolled      Unspecified essential hypertension          PAST SURGICAL HISTORY:  Past Surgical History:   Procedure Laterality Date     APPENDECTOMY  1966     BONE MARROW BIOPSY, BONE SPECIMEN, NEEDLE/TROCAR N/A 2017    Procedure: BIOPSY BONE MARROW;  BONE MARROW BIOPSY;  Surgeon: Shady Garcia MD;  Location:  GI     COLONOSCOPY       ENDOVASCULAR REPAIR ANEURYSM ABDOMINAL AORTA N/A 2017    Procedure: ENDOVASCULAR REPAIR ANEURYSM ABDOMINAL AORTA;  ENDOVASCULAR REPAIR ANEURYSM ABDOMINAL AORTA;  Surgeon: Milton Cazares MD;  Location:  OR     SURGICAL HISTORY OF -       Squamous cell skin cancer resection - back     SURGICAL HISTORY OF -       skin tags resection     SURGICAL HISTORY OF -   2001    stress thall. normal     SURGICAL HISTORY OF -   2002    colonoscopy     SURGICAL HISTORY OF -       Fistulotomy with marsupialization for repair of fisture in ano         SOCIAL HISTORY:  Social History     Social History     Marital status:      Spouse name: librado     Number of children: 0     Years of education: 17     Occupational History     retired      Social History Main Topics     Smoking status: Former Smoker     Packs/day: 0.50     Years: 20.00     Quit date: 1975     Smokeless tobacco: Former User     Alcohol use 6.0 - 8.4 oz/week     10 - 14 Standard drinks or equivalent per week      Comment: 2 drinks a week     Drug use: No     Sexual activity: No     Other Topics Concern     Not on file     Social History Narrative   He quit smoking in .  He drinks 1-3 glasses of wine a night with dinner.  He is retired, and used to work as a .  He lives with his wife in [place].  His cousin had lung cancer and  around age 69.  Otherwise, he denies family history of cancer.        FAMILY HISTORY:  Family History   Problem Relation Age of Onset     Cerebrovascular  "Disease Father      x 2     Hypertension Mother      C.A.D. Mother      Cancer Mother      skin     Eye Disorder Mother      glaucoma     Prostate Cancer No family hx of      Cancer - colorectal No family hx of      Glaucoma No family hx of      Macular Degeneration No family hx of          PHYSICAL EXAM:  Vital signs:  /83  Pulse 107  Temp 97  F (36.1  C) (Tympanic)  Resp 16  Ht 1.727 m (5' 8\")  Wt 96.6 kg (213 lb)  SpO2 97%  BMI 32.39 kg/m2   GENERAL/CONSTITUTIONAL: No acute distress.  EYES: No scleral icterus.  MUSCULOSKELETAL: Warm and well-perfused.  NEUROLOGIC: Alert, oriented, answers questions appropriately.  INTEGUMENTARY: No jaundice.      LABS:  CBC RESULTS:   Recent Labs   Lab Test  10/24/18   0849   WBC  71.4*   RBC  2.95*   HGB  10.2*   HCT  32.1*   MCV  109*   MCH  34.6*   MCHC  31.8   RDW  15.5*   PLT  117*         PATHOLOGY:  Bone marrow biopsy 11/21/17:  FINAL DIAGNOSIS:   Peripheral blood demonstrating macrocytic anemia with thrombocytopenia     Bone marrow trephine biopsy and aspirate demonstrating hypercellular   bone marrow involved by chronic lymphocytic leukemia/small lymphocytic   lymphoma, 13% ringed sideroblasts identified (see comment)     COMMENT:   Immunophenotyping studies demonstrate a kappa monotypic CD5 positive   B-cell population most compatible with chronic lymphocytic leukemia.  An   immunoperoxidase stain for cyclin D1 is pending.  Dysplastic changes are   not identified within the erythroid series.  However, the presence of   increased numbers of ringed sideroblasts raises the possibility of an   associated myelodysplastic syndrome, particularly in light of the noted   increased mean red cell volume and associated thrombocytopenia.  The   thrombocytopenia may also reflect reduced numbers of megakaryocytes   given the involvement of marrow by chronic lymphocytic leukemia.  Many   cases exhibiting ringed sideroblasts are metabolic in origin, without   significant " risk of progression to acute leukemia, and these typically   do not show dysplastic morphology as is the case with the current   specimen.  Moreover, 15% ringed sideroblasts are required for a   diagnosis of  RARS. Cytogenetic studies are pending.     Flow  INTERPRETATION:   Bone Marrow, Left:        CD5 positive Kappa monotypic B cells (82%)     COMMENT:   The clonal B-cell population present in this specimen has a similar   immunophenotype as reported previously in the blood from this patient   ([id number]).  The immunophenotypic findings are suggestive of marrow   involvement by small lymphocytic lymphoma/chronic lymphocytic leukemia   (SLL/CLL). Large cells may not survive specimen processing.  There may   be peripheral blood contamination. Final interpretation requires   correlation with results of other ancillary studies, morphologic and   clinical features.     ISCN:   46,XY,del(13)(q12q14)[2]/45,X,-Y[3]/46,XY[15].nuc   alton(ATMx2,TP53x1)[4/200],(D98Q621i9,BIQI6c2)[21/200]     INTERPRETATION:   Two (10%) of the metaphase cells analyzed comprised a clone   characterized by a deletion within the proximal long arm of a chromosome   13 as the sole karyotypic abnormality. Additionally, three (15%)   metaphases had loss of the Y chromosome as the sole abnormality.     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     Loss of the Y chromosome, as seen in this case, has been reported both   as an acquired clonal abnormality associated with hematologic (primarily   myeloid) disorders and also as a clinically insignificant finding   associated with the normal aging process in adult males. When presenting   as a clonal abnormality, loss of Y is typically seen in addition to   other cytogenetic abnormalities. This patient's age and the absence of   other chromosomal  abnormalities suggest that the loss of Y represents an   age-related finding in this case.         ASSESSMENT/PLAN:  Austin Garza is a 77 year old male with:    1) CLL/SLL: BLACKWOOD stage III, with lymphocytosis, lymphadenopathy, splenomegaly, and anemia.  He has mild thrombocytopenia as well, but is above 100K.  He presented with leukocytosis with WBC of 61.7K, hemoglobin of 10.4, and platelet count of 115K on diagnosis.  Bone marrow biopsy and flow cytometry confirmed CLL/SLL.  CT scan shows mildly enlarged left axillary lymph node and periaortic lymph nodes in the retroperitoneum of the abdomen, with enlarged spleen.      CBC from today reviewed with Austin.  WBC is stable at baseline at 71.4K, trended up since the last check, but around where his baseline levels have been.     He has symptoms of fatigue and sweats around his neck at night.  Elevated lymphocyte count is not necessarily an indication to start treatment.  However, I discussed that if these symptoms are thought to be related to the CLL and if his lymphocyte count continues to rise rapidly or if his cytopenias worsen, this may warrant starting treatment.  He says that he would be okay with that if it is needed.     Since WBC is fairly stable at his baseline, hemoglobin and platelet count are also stable, and his symptoms have not changed, will continue to monitor closely for now.    -labs in 1 month  -RTC in 2 month with repeat labs.    2) Abdominal aortic aneurysm: measures up to 7.6 cm on CT scan, incidentally found.  He underwent EVAR on 12/4/17.  -follow-up with vascular surgery; he is to follow-up with repeat CTA around January 2019    3) Diabetes, hypertension:  -following with PCP      I spent a total of 15 minutes with the patient, with over >50% of the time in counseling and/or coordination of care.      Breana Perry MD  Hematology/Oncology  [facility]      Again, thank you for allowing me to participate in the  care of your patient.        Sincerely,        Breana Perry MD

## 2018-10-25 NOTE — NURSING NOTE
"Oncology Rooming Note    October 25, 2018 9:48 AM   Austin Garza is a 78 year old male who presents for:    Chief Complaint   Patient presents with     Oncology Clinic Visit     CLL (chronic lymphocytic leukemia)     Initial Vitals: /83  Pulse 107  Temp 97  F (36.1  C) (Tympanic)  Resp 16  Ht 1.727 m (5' 8\")  Wt 96.6 kg (213 lb)  SpO2 97%  BMI 32.39 kg/m2 Estimated body mass index is 32.39 kg/(m^2) as calculated from the following:    Height as of this encounter: 1.727 m (5' 8\").    Weight as of this encounter: 96.6 kg (213 lb). Body surface area is 2.15 meters squared.  No Pain (0) Comment: Data Unavailable   No LMP for male patient.  Allergies reviewed: Yes  Medications reviewed: Yes    Medications: Medication refills not needed today.  Pharmacy name entered into [employer]:    [facility] - [facility] [address] [facility] [address] [place] [address] [id number] [facility]    Clinical concerns: follow up     8 minutes for nursing intake (face to face time)     Ita Pinto CMA     DISCHARGE PLAN:  Next appointments: See patient instruction section  Departure Mode: Ambulatory  Accompanied by: self  0 minutes for nursing discharge (face to face time)   Ita Pinto CMA                  "

## 2018-10-25 NOTE — PROGRESS NOTES
[facility]    Hematology/Oncology Established Patient Note      Today's Date: 10/25/18    Reason for Follow-up: CLL      HISTORY OF PRESENT ILLNESS: Austin Garza is a 78 year old male with PMHx of DMII, HTN who presented with leukocytosis.  In November 2017, he was found to have elevated WBC of 61.7K, hemoglobin of 10.4, and platelet of 115K.  There were no other CBC results available between 2010 and 2017.  Prior to 2010, he had normal CBC, with normal to mild anemia, and mild thrombocytopenia.   He was asymptomatic.    He underwent bone marrow biopsy on 11/21/17, which was consistent with chronic lymphocytic leukemia/small lymphocytic lymphoma, with 13% ringed sideroblasts identified.  The presence of increased numbers of ringed sideroblasts raises the possibility of an associated myelodysplastic syndrome.  Dysplastic changes are not identified though.  Many cases exhibiting ringed sideroblasts are metabolic origin, without significant risk of progression to acute leukemia, and these typically do not show dysplastic morphology as is the case with this specimen.  15% ringed sideroblasts are required for a diagnosis for RARS, which this specimen does not meet.  Flow cytometry is also consistent with CLL/SLL.  Cytogenetics show two (10%) of the metaphase cells comprise a clone characterized by a deletion within the proximal long arm of a chromosome 13 as the sole karyotypic abnormality.  Three (15%) metaphases had loss of the Y chromosome as the sole abnormality.    CT scan on 11/29/17 shows mildly enlarged left axillary lymph node and periaortic lymph nodes in the retroperitoneum of the abdomen.  Spleen is also enlarged.    On his staging CT scan for CLL, he was incidentally found to have a large infrarenal abdominal aortic aneurysm, measuring 7.6 cm.  He was seen by Dr. Thomas, and he underwent EVAR on 12/4/17.       INTERIM HISTORY: Austin is here for follow-up today.  He says that he  feels well, about the same.  No new complaints.      REVIEW OF SYSTEMS:   14 point ROS was reviewed and is negative other than as noted above in HPI.       HOME MEDICATIONS:  Current Outpatient Prescriptions   Medication Sig Dispense Refill     aspirin 325 MG tablet Take 1 tablet (325 mg) by mouth every evening 90 tablet 3     atorvastatin (LIPITOR) 20 MG tablet Take 1 tablet (20 mg) by mouth At Bedtime 90 tablet 3     blood glucose monitoring (ONE TOUCH ULTRA) test strip Test 2-3 times daily as directed, brand per insurance formulary. 100 each 0     Blood Glucose Monitoring Suppl (ONE TOUCH ULTRA 2) W/DEVICE KIT Use to test blood sugars 2-3 times daily as directed. Brand per insurance formulary   1 kit 0     fluticasone (FLONASE) 50 MCG/ACT spray Spray 2 sprays into both nostrils 2 times daily 1 Bottle 11     glimepiride (AMARYL) 1 MG tablet Take 1 tablet (1 mg) by mouth every morning (before breakfast) 90 tablet 3     losartan-hydrochlorothiazide (HYZAAR) 50-12.5 MG per tablet Take 1 tablet by mouth daily 90 tablet 3     metFORMIN (GLUCOPHAGE) 1000 MG tablet Take 1 tablet (1,000 mg) by mouth 2 times daily (with meals) 180 tablet 3     Multiple Vitamins-Minerals (CENTRUM SILVER) per tablet Take 1 tablet by mouth daily           ALLERGIES:  Allergies   Allergen Reactions     Ace Inhibitors      cough         PAST MEDICAL HISTORY:  Past Medical History:   Diagnosis Date     Diaphragmatic hernia without mention of obstruction or gangrene      Diverticulitis of colon (without mention of hemorrhage)(562.11)      Esophageal reflux      Irritable bowel syndrome      Macular degeneration (senile) of retina, unspecified      Squamous Cell Carcinoma, Back 1988     Type II or unspecified type diabetes mellitus without mention of complication, not stated as uncontrolled      Unspecified essential hypertension          PAST SURGICAL HISTORY:  Past Surgical History:   Procedure Laterality Date     APPENDECTOMY  1966     BONE  MARROW BIOPSY, BONE SPECIMEN, NEEDLE/TROCAR N/A 2017    Procedure: BIOPSY BONE MARROW;  BONE MARROW BIOPSY;  Surgeon: Shady Garcia MD;  Location:  GI     COLONOSCOPY       ENDOVASCULAR REPAIR ANEURYSM ABDOMINAL AORTA N/A 2017    Procedure: ENDOVASCULAR REPAIR ANEURYSM ABDOMINAL AORTA;  ENDOVASCULAR REPAIR ANEURYSM ABDOMINAL AORTA;  Surgeon: Milton Cazares MD;  Location:  OR     SURGICAL HISTORY OF -       Squamous cell skin cancer resection - back     SURGICAL HISTORY OF -       skin tags resection     SURGICAL HISTORY OF -   2001    stress thall. normal     SURGICAL HISTORY OF -   2002    colonoscopy     SURGICAL HISTORY OF -       Fistulotomy with marsupialization for repair of fisture in ano         SOCIAL HISTORY:  Social History     Social History     Marital status:      Spouse name: librado     Number of children: 0     Years of education: 17     Occupational History     retired      Social History Main Topics     Smoking status: Former Smoker     Packs/day: 0.50     Years: 20.00     Quit date: 1975     Smokeless tobacco: Former User     Alcohol use 6.0 - 8.4 oz/week     10 - 14 Standard drinks or equivalent per week      Comment: 2 drinks a week     Drug use: No     Sexual activity: No     Other Topics Concern     Not on file     Social History Narrative   He quit smoking in .  He drinks 1-3 glasses of wine a night with dinner.  He is retired, and used to work as a .  He lives with his wife in [place].  His cousin had lung cancer and  around age 69.  Otherwise, he denies family history of cancer.        FAMILY HISTORY:  Family History   Problem Relation Age of Onset     Cerebrovascular Disease Father      x 2     Hypertension Mother      C.A.D. Mother      Cancer Mother      skin     Eye Disorder Mother      glaucoma     Prostate Cancer No family hx of      Cancer - colorectal No family hx of      Glaucoma No family hx of      Macular  "Degeneration No family hx of          PHYSICAL EXAM:  Vital signs:  /83  Pulse 107  Temp 97  F (36.1  C) (Tympanic)  Resp 16  Ht 1.727 m (5' 8\")  Wt 96.6 kg (213 lb)  SpO2 97%  BMI 32.39 kg/m2   GENERAL/CONSTITUTIONAL: No acute distress.  EYES: No scleral icterus.  MUSCULOSKELETAL: Warm and well-perfused.  NEUROLOGIC: Alert, oriented, answers questions appropriately.  INTEGUMENTARY: No jaundice.      LABS:  CBC RESULTS:   Recent Labs   Lab Test  10/24/18   0849   WBC  71.4*   RBC  2.95*   HGB  10.2*   HCT  32.1*   MCV  109*   MCH  34.6*   MCHC  31.8   RDW  15.5*   PLT  117*         PATHOLOGY:  Bone marrow biopsy 11/21/17:  FINAL DIAGNOSIS:   Peripheral blood demonstrating macrocytic anemia with thrombocytopenia     Bone marrow trephine biopsy and aspirate demonstrating hypercellular   bone marrow involved by chronic lymphocytic leukemia/small lymphocytic   lymphoma, 13% ringed sideroblasts identified (see comment)     COMMENT:   Immunophenotyping studies demonstrate a kappa monotypic CD5 positive   B-cell population most compatible with chronic lymphocytic leukemia.  An   immunoperoxidase stain for cyclin D1 is pending.  Dysplastic changes are   not identified within the erythroid series.  However, the presence of   increased numbers of ringed sideroblasts raises the possibility of an   associated myelodysplastic syndrome, particularly in light of the noted   increased mean red cell volume and associated thrombocytopenia.  The   thrombocytopenia may also reflect reduced numbers of megakaryocytes   given the involvement of marrow by chronic lymphocytic leukemia.  Many   cases exhibiting ringed sideroblasts are metabolic in origin, without   significant risk of progression to acute leukemia, and these typically   do not show dysplastic morphology as is the case with the current   specimen.  Moreover, 15% ringed sideroblasts are required for a   diagnosis of  RARS. Cytogenetic studies are pending. "     Flow  INTERPRETATION:   Bone Marrow, Left:        CD5 positive Kappa monotypic B cells (82%)     COMMENT:   The clonal B-cell population present in this specimen has a similar   immunophenotype as reported previously in the blood from this patient   ([id number]).  The immunophenotypic findings are suggestive of marrow   involvement by small lymphocytic lymphoma/chronic lymphocytic leukemia   (SLL/CLL). Large cells may not survive specimen processing.  There may   be peripheral blood contamination. Final interpretation requires   correlation with results of other ancillary studies, morphologic and   clinical features.     ISCN:   46,XY,del(13)(q12q14)[2]/45,X,-Y[3]/46,XY[15].nuc   alton(ATMx2,TP53x1)[4/200],(O18S755s1,KFGQ3x7)[21/200]     INTERPRETATION:   Two (10%) of the metaphase cells analyzed comprised a clone   characterized by a deletion within the proximal long arm of a chromosome   13 as the sole karyotypic abnormality. Additionally, three (15%)   metaphases had loss of the Y chromosome as the sole abnormality.     These findings confirm and expand those of the previously reported FISH   analysis ([id number]) that showed 10.5% of interphase cells to have a   signal pattern indicative of loss of the [id number] locus.     Loss of the Y chromosome, as seen in this case, has been reported both   as an acquired clonal abnormality associated with hematologic (primarily   myeloid) disorders and also as a clinically insignificant finding   associated with the normal aging process in adult males. When presenting   as a clonal abnormality, loss of Y is typically seen in addition to   other cytogenetic abnormalities. This patient's age and the absence of   other chromosomal abnormalities suggest that the loss of Y represents an   age-related finding in this case.         ASSESSMENT/PLAN:  Austin Garza is a 77 year old male with:    1) CLL/SLL: BLACKWOOD stage III, with lymphocytosis, lymphadenopathy, splenomegaly, and  anemia.  He has mild thrombocytopenia as well, but is above 100K.  He presented with leukocytosis with WBC of 61.7K, hemoglobin of 10.4, and platelet count of 115K on diagnosis.  Bone marrow biopsy and flow cytometry confirmed CLL/SLL.  CT scan shows mildly enlarged left axillary lymph node and periaortic lymph nodes in the retroperitoneum of the abdomen, with enlarged spleen.      CBC from today reviewed with Austin.  WBC is stable at baseline at 71.4K, trended up since the last check, but around where his baseline levels have been.     He has symptoms of fatigue and sweats around his neck at night.  Elevated lymphocyte count is not necessarily an indication to start treatment.  However, I discussed that if these symptoms are thought to be related to the CLL and if his lymphocyte count continues to rise rapidly or if his cytopenias worsen, this may warrant starting treatment.  He says that he would be okay with that if it is needed.     Since WBC is fairly stable at his baseline, hemoglobin and platelet count are also stable, and his symptoms have not changed, will continue to monitor closely for now.    -labs in 1 month  -RTC in 2 month with repeat labs.    2) Abdominal aortic aneurysm: measures up to 7.6 cm on CT scan, incidentally found.  He underwent EVAR on 12/4/17.  -follow-up with vascular surgery; he is to follow-up with repeat CTA around January 2019    3) Diabetes, hypertension:  -following with PCP      I spent a total of 15 minutes with the patient, with over >50% of the time in counseling and/or coordination of care.      Breana Perry MD  Hematology/Oncology  [facility]

## 2018-10-25 NOTE — MR AVS SNAPSHOT
After Visit Summary   10/25/2018    Austin Garza    MRN: [id number]           Patient Information     Date Of Birth          1940        Visit Information        Provider Department      10/25/2018 9:45 AM Breana Perry MD [facility]        Care Instructions      -lab in 1 month - okay to do at your home [facility]    -return to clinic with Dr. Perry with labs a day prior on 12/20/18 (at the 3:45 pm slot, to coincide with his wife's appointment) Scheduled  Melani SKELTONS given to patient            Follow-ups after your visit        Your next 10 appointments already scheduled     Dec 19, 2018  8:30 AM CST   Return Visit with  ONCOLOGY NURSE   [facility]    [facility]  [address] [place] [address] [address]  [facility] [address] [phone]            Dec 20, 2018  3:45 PM CST   Return Visit with Breana Perry MD   [facility]    [facility]  [address]  [facility] [address] [phone]              Who to contact     If you have questions or need follow up information about today's clinic visit or your schedule please contact [facility] directly at [phone].  Normal or non-critical lab and imaging results will be communicated to you by MyChart, letter or phone within 4 business days after the clinic has received the results. If you do not hear from us within 7 days, please contact the clinic through MyChart or phone. If you have a critical or abnormal lab result, we will notify you by phone as soon as possible.  Submit refill requests through [employer] or call your pharmacy and they will forward the refill request to us. Please allow 3 business days for your refill to be completed.          Additional Information About Your Visit        MyChart Information   "   [employer] gives you secure access to your electronic health record. If you see a primary care provider, you can also send messages to your care team and make appointments. If you have questions, please call your primary care clinic.  If you do not have a primary care provider, please call [phone] and they will assist you.        Care EveryWhere ID     This is your Care EveryWhere ID. This could be used by other organizations to access your [facility] medical records  [id number]        Your Vitals Were     Pulse Temperature Respirations Height Pulse Oximetry BMI (Body Mass Index)    107 97  F (36.1  C) (Tympanic) 16 1.727 m (5' 8\") 97% 32.39 kg/m2       Blood Pressure from Last 3 Encounters:   10/25/18 142/83   10/10/18 134/78   08/23/18 149/84    Weight from Last 3 Encounters:   10/25/18 96.6 kg (213 lb)   10/10/18 95.4 kg (210 lb 5 oz)   08/23/18 97.3 kg (214 lb 6.4 oz)              Today, you had the following     No orders found for display       Primary Care Provider Office Phone # Fax #    Sandi Eastman [phone]       [address] [place] [address]        Equal Access to Services     BAKARI OSMAN AH: Hadii aad ku hadasho Soomaali, waaxda luqadaha, qaybta kaalmada adeegyada, waxay idiin hayjennifern sylvain khabe la'gavino ah. So [facility] [phone].    ATENCIÓN: Si habla español, tiene a edward disposición servicios gratuitos de asistencia lingüística. Llame al [phone].    We comply with applicable federal civil rights laws and [place] laws. We do not discriminate on the basis of race, color, national origin, age, disability, sex, sexual orientation, or gender identity.            Thank you!     Thank you for choosing [facility]  for your care. Our goal is always to provide you with excellent care. Hearing back from our patients is one way we can continue to improve our services. Please take a few minutes to complete the written survey that you may receive " in the mail after your visit with us. Thank you!             Your Updated Medication List - Protect others around you: Learn how to safely use, store and throw away your medicines at www.disposemymeds.org.          This list is accurate as of 10/25/18 10:22 AM.  Always use your most recent med list.                   Brand Name Dispense Instructions for use Diagnosis    aspirin 325 MG tablet     90 tablet    Take 1 tablet (325 mg) by mouth every evening    Abdominal aortic aneurysm (AAA) without rupture (H)       atorvastatin 20 MG tablet    LIPITOR    90 tablet    Take 1 tablet (20 mg) by mouth At Bedtime    Type 2 diabetes mellitus without complication, without long-term current use of insulin (H)       blood glucose monitoring meter device kit     1 kit    Use to test blood sugars 2-3 times daily as directed. Brand per insurance formulary    Type II or unspecified type diabetes mellitus without mention of complication, not stated as uncontrolled       blood glucose monitoring test strip    ONETOUCH ULTRA    100 each    Test 2-3 times daily as directed, brand per insurance formulary.    Type 2 diabetes mellitus without complication, without long-term current use of insulin (H)       CENTRUM SILVER per tablet      Take 1 tablet by mouth daily        fluticasone 50 MCG/ACT spray    FLONASE    1 Bottle    Spray 2 sprays into both nostrils 2 times daily    Viral URI with cough       glimepiride 1 MG tablet    AMARYL    90 tablet    Take 1 tablet (1 mg) by mouth every morning (before breakfast)    Type 2 diabetes mellitus without complication, without long-term current use of insulin (H)       losartan-hydrochlorothiazide 50-12.5 MG per tablet    HYZAAR    90 tablet    Take 1 tablet by mouth daily    Hypertension goal BP (blood pressure) < 140/90       metFORMIN 1000 MG tablet    GLUCOPHAGE    180 tablet    Take 1 tablet (1,000 mg) by mouth 2 times daily (with meals)    Type 2 diabetes mellitus without complication,  without long-term current use of insulin (H)

## 2018-10-25 NOTE — PATIENT INSTRUCTIONS
-lab in 1 month - okay to do at your home [facility]    -return to clinic with Dr. Perry with labs a day prior on 12/20/18 (at the 3:45 pm slot, to coincide with his wife's appointment) Scheduled  Melani BRADLEY given to patient

## 2018-11-09 ENCOUNTER — MYC MEDICAL ADVICE (OUTPATIENT)
Dept: ONCOLOGY | Facility: CLINIC | Age: 78
End: 2018-11-09

## 2018-11-21 DIAGNOSIS — C91.10 CLL (CHRONIC LYMPHOCYTIC LEUKEMIA) (H): ICD-10-CM

## 2018-11-21 DIAGNOSIS — E11.9 TYPE 2 DIABETES MELLITUS WITHOUT COMPLICATION, WITHOUT LONG-TERM CURRENT USE OF INSULIN (H): ICD-10-CM

## 2018-11-21 DIAGNOSIS — Z12.5 SCREENING FOR PROSTATE CANCER: ICD-10-CM

## 2018-11-21 LAB
ANISOCYTOSIS BLD QL SMEAR: SLIGHT
DACRYOCYTES BLD QL SMEAR: SLIGHT
DIFFERENTIAL METHOD BLD: ABNORMAL
ERYTHROCYTE [DISTWIDTH] IN BLOOD BY AUTOMATED COUNT: 16.2 % (ref 10–15)
HCT VFR BLD AUTO: 28.3 % (ref 40–53)
HGB BLD-MCNC: 9.4 G/DL (ref 13.3–17.7)
LYMPHOCYTES # BLD AUTO: 47.7 10E9/L (ref 0.8–5.3)
LYMPHOCYTES NFR BLD AUTO: 86 %
MCH RBC QN AUTO: 35.3 PG (ref 26.5–33)
MCHC RBC AUTO-ENTMCNC: 33.2 G/DL (ref 31.5–36.5)
MCV RBC AUTO: 106 FL (ref 78–100)
NEUTROPHILS # BLD AUTO: 7.8 10E9/L (ref 1.6–8.3)
NEUTROPHILS NFR BLD AUTO: 14 %
PLATELET # BLD AUTO: 105 10E9/L (ref 150–450)
POIKILOCYTOSIS BLD QL SMEAR: SLIGHT
RBC # BLD AUTO: 2.66 10E12/L (ref 4.4–5.9)
WBC # BLD AUTO: 55.5 10E9/L (ref 4–11)

## 2018-11-21 PROCEDURE — 36415 COLL VENOUS BLD VENIPUNCTURE: CPT | Performed by: INTERNAL MEDICINE

## 2018-11-21 PROCEDURE — 85025 COMPLETE CBC W/AUTO DIFF WBC: CPT | Performed by: INTERNAL MEDICINE

## 2018-12-19 ENCOUNTER — ONCOLOGY VISIT (OUTPATIENT)
Dept: ONCOLOGY | Facility: CLINIC | Age: 78
End: 2018-12-19
Attending: INTERNAL MEDICINE
Payer: MEDICARE

## 2018-12-19 ENCOUNTER — HOSPITAL ENCOUNTER (OUTPATIENT)
Facility: CLINIC | Age: 78
Setting detail: SPECIMEN
Discharge: HOME | End: 2018-12-19
Attending: INTERNAL MEDICINE | Admitting: INTERNAL MEDICINE
Payer: MEDICARE

## 2018-12-19 ENCOUNTER — TELEPHONE (OUTPATIENT)
Dept: ONCOLOGY | Facility: CLINIC | Age: 78
End: 2018-12-19

## 2018-12-19 DIAGNOSIS — C91.10 CLL (CHRONIC LYMPHOCYTIC LEUKEMIA) (H): ICD-10-CM

## 2018-12-19 LAB
ANISOCYTOSIS BLD QL SMEAR: SLIGHT
BASOPHILS # BLD AUTO: 0 10E9/L (ref 0–0.2)
BASOPHILS NFR BLD AUTO: 0 %
DACRYOCYTES BLD QL SMEAR: SLIGHT
DIFFERENTIAL METHOD BLD: ABNORMAL
EOSINOPHIL # BLD AUTO: 0 10E9/L (ref 0–0.7)
EOSINOPHIL NFR BLD AUTO: 0 %
ERYTHROCYTE [DISTWIDTH] IN BLOOD BY AUTOMATED COUNT: 15.9 % (ref 10–15)
HCT VFR BLD AUTO: 30.7 % (ref 40–53)
HGB BLD-MCNC: 9.7 G/DL (ref 13.3–17.7)
LYMPHOCYTES # BLD AUTO: 55.5 10E9/L (ref 0.8–5.3)
LYMPHOCYTES NFR BLD AUTO: 85 %
MACROCYTES BLD QL SMEAR: PRESENT
MCH RBC QN AUTO: 33.9 PG (ref 26.5–33)
MCHC RBC AUTO-ENTMCNC: 31.6 G/DL (ref 31.5–36.5)
MCV RBC AUTO: 107 FL (ref 78–100)
MONOCYTES # BLD AUTO: 4.6 10E9/L (ref 0–1.3)
MONOCYTES NFR BLD AUTO: 7 %
NEUTROPHILS # BLD AUTO: 5.2 10E9/L (ref 1.6–8.3)
NEUTROPHILS NFR BLD AUTO: 8 %
OVALOCYTES BLD QL SMEAR: SLIGHT
PLATELET # BLD AUTO: 113 10E9/L (ref 150–450)
PLATELET # BLD EST: ABNORMAL 10*3/UL
RBC # BLD AUTO: 2.86 10E12/L (ref 4.4–5.9)
WBC # BLD AUTO: 65.3 10E9/L (ref 4–11)

## 2018-12-19 PROCEDURE — 36415 COLL VENOUS BLD VENIPUNCTURE: CPT

## 2018-12-19 PROCEDURE — 85025 COMPLETE CBC W/AUTO DIFF WBC: CPT | Performed by: INTERNAL MEDICINE

## 2018-12-19 NOTE — TELEPHONE ENCOUNTER
Lab called this morning to let us know the critical lab value for patient.  Pt's WBC 65.3 this morning.  Will route message to Dr. Perry right away.

## 2018-12-19 NOTE — LETTER
12/19/2018         RE: Austin Garza  [address]  [place] [address]        Dear Colleague,    Thank you for referring your patient, Austin Garza, to the [facility]. Please see a copy of my visit note below.    See Nursing Notes     Again, thank you for allowing me to participate in the care of your patient.        Sincerely,        [facility] Oncology Nurse

## 2018-12-20 ENCOUNTER — ONCOLOGY VISIT (OUTPATIENT)
Dept: ONCOLOGY | Facility: CLINIC | Age: 78
End: 2018-12-20
Attending: INTERNAL MEDICINE
Payer: COMMERCIAL

## 2018-12-20 VITALS
SYSTOLIC BLOOD PRESSURE: 124 MMHG | BODY MASS INDEX: 32.28 KG/M2 | DIASTOLIC BLOOD PRESSURE: 76 MMHG | RESPIRATION RATE: 18 BRPM | HEART RATE: 91 BPM | HEIGHT: 68 IN | TEMPERATURE: 97.9 F | WEIGHT: 213 LBS | OXYGEN SATURATION: 96 %

## 2018-12-20 DIAGNOSIS — C91.10 CLL (CHRONIC LYMPHOCYTIC LEUKEMIA) (H): Primary | ICD-10-CM

## 2018-12-20 PROCEDURE — G0463 HOSPITAL OUTPT CLINIC VISIT: HCPCS

## 2018-12-20 PROCEDURE — 99214 OFFICE O/P EST MOD 30 MIN: CPT | Performed by: INTERNAL MEDICINE

## 2018-12-20 ASSESSMENT — PAIN SCALES - GENERAL: PAINLEVEL: NO PAIN (0)

## 2018-12-20 ASSESSMENT — MIFFLIN-ST. JEOR: SCORE: 1670.66

## 2018-12-20 NOTE — NURSING NOTE
"Oncology Rooming Note    December 20, 2018 3:22 PM   Austin Garza is a 78 year old male who presents for:    Chief Complaint   Patient presents with     Oncology Clinic Visit     CLL (chronic lymphocytic leukemia), BCC (basal cell carcinoma),      Initial Vitals: Resp 18   Ht 1.727 m (5' 8\")   Wt 96.6 kg (213 lb)   BMI 32.39 kg/m   Estimated body mass index is 32.39 kg/m  as calculated from the following:    Height as of this encounter: 1.727 m (5' 8\").    Weight as of this encounter: 96.6 kg (213 lb). Body surface area is 2.15 meters squared.  No Pain (0) Comment: Data Unavailable   No LMP for male patient.  Allergies reviewed: Yes  Medications reviewed: Yes    Medications: Medication refills not needed today.  Pharmacy name entered into [employer]:    [facility] - [facility] [address] [facility] [address] [facility] [address] [facility]    Clinical concerns: f/u  - poss food poisoning X 2 days. Diarrhea, vomiting, CIERRA. Is eating somewhat and is keeping down some food and fluids.    8 minutes for nursing intake (face to face time)     Geetha Benítez CMA            DISCHARGE PLAN:  Next appointments: See patient instruction section  Departure Mode: Ambulatory  Accompanied by: wife  0 minutes for nursing discharge (face to face time)   Geetha Benítez CMA      "

## 2018-12-20 NOTE — PROGRESS NOTES
[facility]    Hematology/Oncology Established Patient Note      Today's Date: 12/20/18    Reason for Follow-up: CLL      HISTORY OF PRESENT ILLNESS: Austin Garza is a 78 year old male with PMHx of DMII, HTN who presented with leukocytosis.  In November 2017, he was found to have elevated WBC of 61.7K, hemoglobin of 10.4, and platelet of 115K.  There were no other CBC results available between 2010 and 2017.  Prior to 2010, he had normal CBC, with normal to mild anemia, and mild thrombocytopenia.   He was asymptomatic.    He underwent bone marrow biopsy on 11/21/17, which was consistent with chronic lymphocytic leukemia/small lymphocytic lymphoma, with 13% ringed sideroblasts identified.  The presence of increased numbers of ringed sideroblasts raises the possibility of an associated myelodysplastic syndrome.  Dysplastic changes are not identified though.  Many cases exhibiting ringed sideroblasts are metabolic origin, without significant risk of progression to acute leukemia, and these typically do not show dysplastic morphology as is the case with this specimen.  15% ringed sideroblasts are required for a diagnosis for RARS, which this specimen does not meet.  Flow cytometry is also consistent with CLL/SLL.  Cytogenetics show two (10%) of the metaphase cells comprise a clone characterized by a deletion within the proximal long arm of a chromosome 13 as the sole karyotypic abnormality.  Three (15%) metaphases had loss of the Y chromosome as the sole abnormality.    CT scan on 11/29/17 shows mildly enlarged left axillary lymph node and periaortic lymph nodes in the retroperitoneum of the abdomen.  Spleen is also enlarged.    On his staging CT scan for CLL, he was incidentally found to have a large infrarenal abdominal aortic aneurysm, measuring 7.6 cm.  He was seen by Dr. Thomas, and he underwent EVAR on 12/4/17.       INTERIM HISTORY: Austin is here for follow-up today.  He says that he  might have had a GI bug recently because he had some diarrhea and vomiting.  That has since resolved by yesterday.  He feels better now.  He notes that he has been having more fatigue though.      REVIEW OF SYSTEMS:   14 point ROS was reviewed and is negative other than as noted above in HPI.       HOME MEDICATIONS:  Current Outpatient Medications   Medication Sig Dispense Refill     aspirin 325 MG tablet Take 1 tablet (325 mg) by mouth every evening 90 tablet 3     atorvastatin (LIPITOR) 20 MG tablet Take 1 tablet (20 mg) by mouth At Bedtime 90 tablet 3     blood glucose monitoring (ONE TOUCH ULTRA) test strip Test 2-3 times daily as directed, brand per insurance formulary. 100 each 0     Blood Glucose Monitoring Suppl (ONE TOUCH ULTRA 2) W/DEVICE KIT Use to test blood sugars 2-3 times daily as directed. Brand per insurance formulary   1 kit 0     fluticasone (FLONASE) 50 MCG/ACT spray Spray 2 sprays into both nostrils 2 times daily 1 Bottle 11     glimepiride (AMARYL) 1 MG tablet Take 1 tablet (1 mg) by mouth every morning (before breakfast) 90 tablet 3     losartan-hydrochlorothiazide (HYZAAR) 50-12.5 MG per tablet Take 1 tablet by mouth daily 90 tablet 3     metFORMIN (GLUCOPHAGE) 1000 MG tablet Take 1 tablet (1,000 mg) by mouth 2 times daily (with meals) 180 tablet 3     Multiple Vitamins-Minerals (CENTRUM SILVER) per tablet Take 1 tablet by mouth daily           ALLERGIES:  Allergies   Allergen Reactions     Ace Inhibitors      cough         PAST MEDICAL HISTORY:  Past Medical History:   Diagnosis Date     Diaphragmatic hernia without mention of obstruction or gangrene      Diverticulitis of colon (without mention of hemorrhage)(562.11)      Esophageal reflux      Irritable bowel syndrome      Macular degeneration (senile) of retina, unspecified      Squamous Cell Carcinoma, Back 1988     Type II or unspecified type diabetes mellitus without mention of complication, not stated as uncontrolled      Unspecified  essential hypertension          PAST SURGICAL HISTORY:  Past Surgical History:   Procedure Laterality Date     APPENDECTOMY  1966     BONE MARROW BIOPSY, BONE SPECIMEN, NEEDLE/TROCAR N/A 2017    Procedure: BIOPSY BONE MARROW;  BONE MARROW BIOPSY;  Surgeon: Shady Garcia MD;  Location:  GI     COLONOSCOPY       ENDOVASCULAR REPAIR ANEURYSM ABDOMINAL AORTA N/A 2017    Procedure: ENDOVASCULAR REPAIR ANEURYSM ABDOMINAL AORTA;  ENDOVASCULAR REPAIR ANEURYSM ABDOMINAL AORTA;  Surgeon: Milton Cazares MD;  Location:  OR     SURGICAL HISTORY OF -       Squamous cell skin cancer resection - back     SURGICAL HISTORY OF -       skin tags resection     SURGICAL HISTORY OF -   2001    stress thall. normal     SURGICAL HISTORY OF -   2002    colonoscopy     SURGICAL HISTORY OF -       Fistulotomy with marsupialization for repair of fisture in ano         SOCIAL HISTORY:  Social History     Socioeconomic History     Marital status:      Spouse name: librado     Number of children: 0     Years of education: 17     Highest education level: Not on file   Social Needs     Financial resource strain: Not on file     Food insecurity - worry: Not on file     Food insecurity - inability: Not on file     Transportation needs - medical: Not on file     Transportation needs - non-medical: Not on file   Occupational History     Occupation: retired   Tobacco Use     Smoking status: Former Smoker     Packs/day: 0.50     Years: 20.00     Pack years: 10.00     Last attempt to quit: 1975     Years since quittin.9     Smokeless tobacco: Former User   Substance and Sexual Activity     Alcohol use: Yes     Alcohol/week: 6.0 - 8.4 oz     Types: 10 - 14 Standard drinks or equivalent per week     Comment: 2 drinks a week     Drug use: No     Sexual activity: No   Other Topics Concern     Parent/sibling w/ CABG, MI or angioplasty before 65F 55M? Not Asked   Social History Narrative     Not on  "file   He quit smoking in .  He drinks 1-3 glasses of wine a night with dinner.  He is retired, and used to work as a .  He lives with his wife in [place].  His cousin had lung cancer and  around age 69.  Otherwise, he denies family history of cancer.        FAMILY HISTORY:  Family History   Problem Relation Age of Onset     Cerebrovascular Disease Father         x 2     Hypertension Mother      C.A.D. Mother      Cancer Mother         skin     Eye Disorder Mother         glaucoma     Prostate Cancer No family hx of      Cancer - colorectal No family hx of      Glaucoma No family hx of      Macular Degeneration No family hx of          PHYSICAL EXAM:  Vital signs:  Resp 18   Ht 1.727 m (5' 8\")   Wt 96.6 kg (213 lb)   BMI 32.39 kg/m     GENERAL/CONSTITUTIONAL: No acute distress.  EYES: No scleral icterus.  MUSCULOSKELETAL: Warm and well-perfused.  NEUROLOGIC: Alert, oriented, answers questions appropriately.  INTEGUMENTARY: No jaundice.      LABS:  CBC RESULTS:   Recent Labs   Lab Test 18  0828   WBC 65.3*   RBC 2.86*   HGB 9.7*   HCT 30.7*   *   MCH 33.9*   MCHC 31.6   RDW 15.9*   *         PATHOLOGY:  Bone marrow biopsy 17:  FINAL DIAGNOSIS:   Peripheral blood demonstrating macrocytic anemia with thrombocytopenia     Bone marrow trephine biopsy and aspirate demonstrating hypercellular   bone marrow involved by chronic lymphocytic leukemia/small lymphocytic   lymphoma, 13% ringed sideroblasts identified (see comment)     COMMENT:   Immunophenotyping studies demonstrate a kappa monotypic CD5 positive   B-cell population most compatible with chronic lymphocytic leukemia.  An   immunoperoxidase stain for cyclin D1 is pending.  Dysplastic changes are   not identified within the erythroid series.  However, the presence of   increased numbers of ringed sideroblasts raises the possibility of an   associated myelodysplastic syndrome, particularly in light of the noted   increased " mean red cell volume and associated thrombocytopenia.  The   thrombocytopenia may also reflect reduced numbers of megakaryocytes   given the involvement of marrow by chronic lymphocytic leukemia.  Many   cases exhibiting ringed sideroblasts are metabolic in origin, without   significant risk of progression to acute leukemia, and these typically   do not show dysplastic morphology as is the case with the current   specimen.  Moreover, 15% ringed sideroblasts are required for a   diagnosis of  RARS. Cytogenetic studies are pending.     Flow  INTERPRETATION:   Bone Marrow, Left:        CD5 positive Kappa monotypic B cells (82%)     COMMENT:   The clonal B-cell population present in this specimen has a similar   immunophenotype as reported previously in the blood from this patient   ([id number]).  The immunophenotypic findings are suggestive of marrow   involvement by small lymphocytic lymphoma/chronic lymphocytic leukemia   (SLL/CLL). Large cells may not survive specimen processing.  There may   be peripheral blood contamination. Final interpretation requires   correlation with results of other ancillary studies, morphologic and   clinical features.     ISCN:   46,XY,del(13)(q12q14)[2]/45,X,-Y[3]/46,XY[15].nuc   alton(ATMx2,TP53x1)[4/200],(V10P732g5,OMCW2i7)[21/200]     INTERPRETATION:   Two (10%) of the metaphase cells analyzed comprised a clone   characterized by a deletion within the proximal long arm of a chromosome   13 as the sole karyotypic abnormality. Additionally, three (15%)   metaphases had loss of the Y chromosome as the sole abnormality.     These findings confirm and expand those of the previously reported FISH   analysis ([id number]) that showed 10.5% of interphase cells to have a   signal pattern indicative of loss of the [id number] locus.     Loss of the Y chromosome, as seen in this case, has been reported both   as an acquired clonal abnormality associated with hematologic (primarily   myeloid) disorders  and also as a clinically insignificant finding   associated with the normal aging process in adult males. When presenting   as a clonal abnormality, loss of Y is typically seen in addition to   other cytogenetic abnormalities. This patient's age and the absence of   other chromosomal abnormalities suggest that the loss of Y represents an   age-related finding in this case.         ASSESSMENT/PLAN:  Austin Garza is a 78 year old male with:    1) CLL/SLL: BLACKWOOD stage III, with lymphocytosis, lymphadenopathy, splenomegaly, and anemia.  He has mild thrombocytopenia as well, but is above 100K.  He presented with leukocytosis with WBC of 61.7K, hemoglobin of 10.4, and platelet count of 115K on diagnosis.  Bone marrow biopsy and flow cytometry confirmed CLL/SLL.  CT scan shows mildly enlarged left axillary lymph node and periaortic lymph nodes in the retroperitoneum of the abdomen, with enlarged spleen.      CBC from yesterday reviewed with Austin.  WBC is stable at baseline at 65.3K, around where his baseline levels have been.  Platelet count and hemoglobin remain in his baseline range.    He has symptoms of fatigue and sweats around his neck at night.  Elevated lymphocyte count is not necessarily an indication to start treatment.  However, I discussed that if these symptoms are thought to be related to the CLL and if his lymphocyte count continues to rise rapidly or if his cytopenias worsen, this may warrant starting treatment.  He says that he would be okay with that if it is needed.     Since WBC is fairly stable at his baseline, hemoglobin and platelet count are also stable, and his symptoms have not changed, will continue to monitor closely for now.    -RTC in 3 months with repeat CBC    2) Abdominal aortic aneurysm: measures up to 7.6 cm on CT scan, incidentally found.  He underwent EVAR on 12/4/17.  -follow-up with vascular surgery    3) Diabetes, hypertension:  -following with PCP    4) Diarrhea/nausea/vomiting:  Symptoms have resolved.  He may have had a viral gastroenteritis.  He will let us or his PCP know if symptoms recur.      I spent a total of 25 minutes with the patient, with over >50% of the time in counseling and/or coordination of care.      Breana Perry MD  Hematology/Oncology  [facility]

## 2018-12-20 NOTE — LETTER
12/20/2018         RE: Austin Garza  [address]  [place] [address]        Dear Colleague,    Thank you for referring your patient, Austin Garza, to the [facility]. Please see a copy of my visit note below.    [facility]    Hematology/Oncology Established Patient Note      Today's Date: 12/20/18    Reason for Follow-up: CLL      HISTORY OF PRESENT ILLNESS: Austin Garza is a 78 year old male with PMHx of DMII, HTN who presented with leukocytosis.  In November 2017, he was found to have elevated WBC of 61.7K, hemoglobin of 10.4, and platelet of 115K.  There were no other CBC results available between 2010 and 2017.  Prior to 2010, he had normal CBC, with normal to mild anemia, and mild thrombocytopenia.   He was asymptomatic.    He underwent bone marrow biopsy on 11/21/17, which was consistent with chronic lymphocytic leukemia/small lymphocytic lymphoma, with 13% ringed sideroblasts identified.  The presence of increased numbers of ringed sideroblasts raises the possibility of an associated myelodysplastic syndrome.  Dysplastic changes are not identified though.  Many cases exhibiting ringed sideroblasts are metabolic origin, without significant risk of progression to acute leukemia, and these typically do not show dysplastic morphology as is the case with this specimen.  15% ringed sideroblasts are required for a diagnosis for RARS, which this specimen does not meet.  Flow cytometry is also consistent with CLL/SLL.  Cytogenetics show two (10%) of the metaphase cells comprise a clone characterized by a deletion within the proximal long arm of a chromosome 13 as the sole karyotypic abnormality.  Three (15%) metaphases had loss of the Y chromosome as the sole abnormality.    CT scan on 11/29/17 shows mildly enlarged left axillary lymph node and periaortic lymph nodes in the retroperitoneum of the abdomen.  Spleen is also enlarged.    On his  staging CT scan for CLL, he was incidentally found to have a large infrarenal abdominal aortic aneurysm, measuring 7.6 cm.  He was seen by Dr. Thomas, and he underwent EVAR on 12/4/17.       INTERIM HISTORY: Austin is here for follow-up today.  He says that he might have had a GI bug recently because he had some diarrhea and vomiting.  That has since resolved by yesterday.  He feels better now.  He notes that he has been having more fatigue though.      REVIEW OF SYSTEMS:   14 point ROS was reviewed and is negative other than as noted above in HPI.       HOME MEDICATIONS:  Current Outpatient Medications   Medication Sig Dispense Refill     aspirin 325 MG tablet Take 1 tablet (325 mg) by mouth every evening 90 tablet 3     atorvastatin (LIPITOR) 20 MG tablet Take 1 tablet (20 mg) by mouth At Bedtime 90 tablet 3     blood glucose monitoring (ONE TOUCH ULTRA) test strip Test 2-3 times daily as directed, brand per insurance formulary. 100 each 0     Blood Glucose Monitoring Suppl (ONE TOUCH ULTRA 2) W/DEVICE KIT Use to test blood sugars 2-3 times daily as directed. Brand per insurance formulary   1 kit 0     fluticasone (FLONASE) 50 MCG/ACT spray Spray 2 sprays into both nostrils 2 times daily 1 Bottle 11     glimepiride (AMARYL) 1 MG tablet Take 1 tablet (1 mg) by mouth every morning (before breakfast) 90 tablet 3     losartan-hydrochlorothiazide (HYZAAR) 50-12.5 MG per tablet Take 1 tablet by mouth daily 90 tablet 3     metFORMIN (GLUCOPHAGE) 1000 MG tablet Take 1 tablet (1,000 mg) by mouth 2 times daily (with meals) 180 tablet 3     Multiple Vitamins-Minerals (CENTRUM SILVER) per tablet Take 1 tablet by mouth daily           ALLERGIES:  Allergies   Allergen Reactions     Ace Inhibitors      cough         PAST MEDICAL HISTORY:  Past Medical History:   Diagnosis Date     Diaphragmatic hernia without mention of obstruction or gangrene      Diverticulitis of colon (without mention of hemorrhage)(562.11)      Esophageal  reflux      Irritable bowel syndrome      Macular degeneration (senile) of retina, unspecified      Squamous Cell Carcinoma, Back      Type II or unspecified type diabetes mellitus without mention of complication, not stated as uncontrolled      Unspecified essential hypertension          PAST SURGICAL HISTORY:  Past Surgical History:   Procedure Laterality Date     APPENDECTOMY       BONE MARROW BIOPSY, BONE SPECIMEN, NEEDLE/TROCAR N/A 2017    Procedure: BIOPSY BONE MARROW;  BONE MARROW BIOPSY;  Surgeon: Shady Garcia MD;  Location:  GI     COLONOSCOPY       ENDOVASCULAR REPAIR ANEURYSM ABDOMINAL AORTA N/A 2017    Procedure: ENDOVASCULAR REPAIR ANEURYSM ABDOMINAL AORTA;  ENDOVASCULAR REPAIR ANEURYSM ABDOMINAL AORTA;  Surgeon: Milton Cazares MD;  Location:  OR     SURGICAL HISTORY OF -       Squamous cell skin cancer resection - back     SURGICAL HISTORY OF -       skin tags resection     SURGICAL HISTORY OF -   2001    stress thall. normal     SURGICAL HISTORY OF -   2002    colonoscopy     SURGICAL HISTORY OF -       Fistulotomy with marsupialization for repair of fisture in ano         SOCIAL HISTORY:  Social History     Socioeconomic History     Marital status:      Spouse name: librado     Number of children: 0     Years of education: 17     Highest education level: Not on file   Social Needs     Financial resource strain: Not on file     Food insecurity - worry: Not on file     Food insecurity - inability: Not on file     Transportation needs - medical: Not on file     Transportation needs - non-medical: Not on file   Occupational History     Occupation: retired   Tobacco Use     Smoking status: Former Smoker     Packs/day: 0.50     Years: 20.00     Pack years: 10.00     Last attempt to quit: 1975     Years since quittin.9     Smokeless tobacco: Former User   Substance and Sexual Activity     Alcohol use: Yes     Alcohol/week: 6.0 - 8.4  "oz     Types: 10 - 14 Standard drinks or equivalent per week     Comment: 2 drinks a week     Drug use: No     Sexual activity: No   Other Topics Concern     Parent/sibling w/ CABG, MI or angioplasty before 65F 55M? Not Asked   Social History Narrative     Not on file   He quit smoking in .  He drinks 1-3 glasses of wine a night with dinner.  He is retired, and used to work as a .  He lives with his wife in [place].  His cousin had lung cancer and  around age 69.  Otherwise, he denies family history of cancer.        FAMILY HISTORY:  Family History   Problem Relation Age of Onset     Cerebrovascular Disease Father         x 2     Hypertension Mother      C.A.D. Mother      Cancer Mother         skin     Eye Disorder Mother         glaucoma     Prostate Cancer No family hx of      Cancer - colorectal No family hx of      Glaucoma No family hx of      Macular Degeneration No family hx of          PHYSICAL EXAM:  Vital signs:  Resp 18   Ht 1.727 m (5' 8\")   Wt 96.6 kg (213 lb)   BMI 32.39 kg/m      GENERAL/CONSTITUTIONAL: No acute distress.  EYES: No scleral icterus.  MUSCULOSKELETAL: Warm and well-perfused.  NEUROLOGIC: Alert, oriented, answers questions appropriately.  INTEGUMENTARY: No jaundice.      LABS:  CBC RESULTS:   Recent Labs   Lab Test 18  0828   WBC 65.3*   RBC 2.86*   HGB 9.7*   HCT 30.7*   *   MCH 33.9*   MCHC 31.6   RDW 15.9*   *         PATHOLOGY:  Bone marrow biopsy 17:  FINAL DIAGNOSIS:   Peripheral blood demonstrating macrocytic anemia with thrombocytopenia     Bone marrow trephine biopsy and aspirate demonstrating hypercellular   bone marrow involved by chronic lymphocytic leukemia/small lymphocytic   lymphoma, 13% ringed sideroblasts identified (see comment)     COMMENT:   Immunophenotyping studies demonstrate a kappa monotypic CD5 positive   B-cell population most compatible with chronic lymphocytic leukemia.  An   immunoperoxidase stain for cyclin D1 is " pending.  Dysplastic changes are   not identified within the erythroid series.  However, the presence of   increased numbers of ringed sideroblasts raises the possibility of an   associated myelodysplastic syndrome, particularly in light of the noted   increased mean red cell volume and associated thrombocytopenia.  The   thrombocytopenia may also reflect reduced numbers of megakaryocytes   given the involvement of marrow by chronic lymphocytic leukemia.  Many   cases exhibiting ringed sideroblasts are metabolic in origin, without   significant risk of progression to acute leukemia, and these typically   do not show dysplastic morphology as is the case with the current   specimen.  Moreover, 15% ringed sideroblasts are required for a   diagnosis of  RARS. Cytogenetic studies are pending.     Flow  INTERPRETATION:   Bone Marrow, Left:        CD5 positive Kappa monotypic B cells (82%)     COMMENT:   The clonal B-cell population present in this specimen has a similar   immunophenotype as reported previously in the blood from this patient   ([id number]).  The immunophenotypic findings are suggestive of marrow   involvement by small lymphocytic lymphoma/chronic lymphocytic leukemia   (SLL/CLL). Large cells may not survive specimen processing.  There may   be peripheral blood contamination. Final interpretation requires   correlation with results of other ancillary studies, morphologic and   clinical features.     ISCN:   46,XY,del(13)(q12q14)[2]/45,X,-Y[3]/46,XY[15].nuc   alton(ATMx2,TP53x1)[4/200],(Q48C805v4,AWIW3q3)[21/200]     INTERPRETATION:   Two (10%) of the metaphase cells analyzed comprised a clone   characterized by a deletion within the proximal long arm of a chromosome   13 as the sole karyotypic abnormality. Additionally, three (15%)   metaphases had loss of the Y chromosome as the sole abnormality.     These findings confirm and expand those of the previously reported FISH   analysis ([id number]) that showed  10.5% of interphase cells to have a   signal pattern indicative of loss of the [id number] locus.     Loss of the Y chromosome, as seen in this case, has been reported both   as an acquired clonal abnormality associated with hematologic (primarily   myeloid) disorders and also as a clinically insignificant finding   associated with the normal aging process in adult males. When presenting   as a clonal abnormality, loss of Y is typically seen in addition to   other cytogenetic abnormalities. This patient's age and the absence of   other chromosomal abnormalities suggest that the loss of Y represents an   age-related finding in this case.         ASSESSMENT/PLAN:  Austin Garza is a 78 year old male with:    1) CLL/SLL: BLACKWOOD stage III, with lymphocytosis, lymphadenopathy, splenomegaly, and anemia.  He has mild thrombocytopenia as well, but is above 100K.  He presented with leukocytosis with WBC of 61.7K, hemoglobin of 10.4, and platelet count of 115K on diagnosis.  Bone marrow biopsy and flow cytometry confirmed CLL/SLL.  CT scan shows mildly enlarged left axillary lymph node and periaortic lymph nodes in the retroperitoneum of the abdomen, with enlarged spleen.      CBC from yesterday reviewed with Austin.  WBC is stable at baseline at 65.3K, around where his baseline levels have been.  Platelet count and hemoglobin remain in his baseline range.    He has symptoms of fatigue and sweats around his neck at night.  Elevated lymphocyte count is not necessarily an indication to start treatment.  However, I discussed that if these symptoms are thought to be related to the CLL and if his lymphocyte count continues to rise rapidly or if his cytopenias worsen, this may warrant starting treatment.  He says that he would be okay with that if it is needed.     Since WBC is fairly stable at his baseline, hemoglobin and platelet count are also stable, and his symptoms have not changed, will continue to monitor closely for  now.    -RTC in 3 months with repeat CBC    2) Abdominal aortic aneurysm: measures up to 7.6 cm on CT scan, incidentally found.  He underwent EVAR on 12/4/17.  -follow-up with vascular surgery    3) Diabetes, hypertension:  -following with PCP    4) Diarrhea/nausea/vomiting: Symptoms have resolved.  He may have had a viral gastroenteritis.  He will let us or his PCP know if symptoms recur.      I spent a total of 25 minutes with the patient, with over >50% of the time in counseling and/or coordination of care.      Breana Perry MD  Hematology/Oncology  [facility]      Again, thank you for allowing me to participate in the care of your patient.        Sincerely,        Breana Perry MD

## 2019-01-11 ENCOUNTER — HOSPITAL ENCOUNTER (OUTPATIENT)
Dept: CT IMAGING | Facility: CLINIC | Age: 79
Discharge: HOME | End: 2019-01-11
Attending: SURGERY | Admitting: SURGERY
Payer: COMMERCIAL

## 2019-01-11 ENCOUNTER — OFFICE VISIT (OUTPATIENT)
Dept: OTHER | Facility: CLINIC | Age: 79
End: 2019-01-11
Attending: SURGERY
Payer: COMMERCIAL

## 2019-01-11 VITALS — DIASTOLIC BLOOD PRESSURE: 83 MMHG | HEART RATE: 108 BPM | SYSTOLIC BLOOD PRESSURE: 153 MMHG

## 2019-01-11 DIAGNOSIS — I71.40 ABDOMINAL AORTIC ANEURYSM (AAA) WITHOUT RUPTURE (H): Primary | ICD-10-CM

## 2019-01-11 DIAGNOSIS — I71.40 ABDOMINAL AORTIC ANEURYSM (AAA) WITHOUT RUPTURE (H): ICD-10-CM

## 2019-01-11 LAB
CREAT BLD-MCNC: 1.5 MG/DL (ref 0.66–1.25)
GFR SERPL CREATININE-BSD FRML MDRD: 46 ML/MIN/{1.73_M2}

## 2019-01-11 PROCEDURE — G0463 HOSPITAL OUTPT CLINIC VISIT: HCPCS | Mod: 25

## 2019-01-11 PROCEDURE — 25000125 ZZHC RX 250: Performed by: SURGERY

## 2019-01-11 PROCEDURE — 25000128 H RX IP 250 OP 636: Performed by: SURGERY

## 2019-01-11 PROCEDURE — 74174 CTA ABD&PLVS W/CONTRAST: CPT

## 2019-01-11 PROCEDURE — 99213 OFFICE O/P EST LOW 20 MIN: CPT | Mod: ZP | Performed by: SURGERY

## 2019-01-11 PROCEDURE — 82565 ASSAY OF CREATININE: CPT

## 2019-01-11 RX ORDER — IOPAMIDOL 755 MG/ML
80 INJECTION, SOLUTION INTRAVASCULAR ONCE
Status: COMPLETED | OUTPATIENT
Start: 2019-01-11 | End: 2019-01-11

## 2019-01-11 RX ADMIN — SODIUM CHLORIDE 80 ML: 9 INJECTION, SOLUTION INTRAVENOUS at 09:04

## 2019-01-11 RX ADMIN — IOPAMIDOL 80 ML: 755 INJECTION, SOLUTION INTRAVENOUS at 09:04

## 2019-01-11 NOTE — NURSING NOTE
"Austin Garza is a 78 year old male who presents for:  Chief Complaint   Patient presents with     RECHECK     (8:30 SHCT, 10:30 BGA) History of AAA; 6 months follow up to 7-17-18 appointment with Dr. Cazares. Noted to CT to load images ASAP.         Vitals:    Vitals:    01/11/19 1040   BP: 153/83   BP Location: Right arm   Patient Position: Chair   Cuff Size: Adult Regular   Pulse: 108       BMI:  Estimated body mass index is 32.39 kg/m  as calculated from the following:    Height as of 12/20/18: 5' 8\" (1.727 m).    Weight as of 12/20/18: 213 lb (96.6 kg).    Pain Score:  Data Unavailable        Regine Mccollum MA    "

## 2019-01-11 NOTE — PROGRESS NOTES
Vascular Surgery Clinic Note     Austin is here for his 1 year visit with no acute issues.  His CT angiogram shows still a persistent type II endoleak that is probably from the YOHAN or a lumbar artery source as well.  The aneurysm sac appears slightly larger on measurement today as previously it was stable.  It measures 7.6 cm from 7.3 cm 6 months prior.  He is otherwise asymptomatic and no pain at all.       Physical Examination:  /83 (BP Location: Right arm, Patient Position: Chair, Cuff Size: Adult Regular)   Pulse 108     Constitutional:  NAD, alert and appropriate, well developed male.  HEENT: PERRLA, mucous membranes moist.  Abd: soft, NT, no masses and non-pulsatile.  Ext:  Warm and well perfused.  Motor intact and sensory.      Assessment: 78 yo male s/p EVAR 12/2017 with a delayed Type II endoleak.      Plan:  I plan to set up Austin for an abdominal aortogram with assessment of the location of the type II endoleak and possible embolization.  I've discussed with him that I'm concerned the aneurysm sac is now growing and we do need to assess the source of the leak and consider treatment at that time.  He is leaving for 1 month and we will schedule it for a date upon his return to [place].  He continues to be asymptomatic and I've asked him to call if he develops significant discomfort in his low back/abdomen.   I've spent 15 minutes of face-to-face time, > 50% spent counseling and coordinating care.      Milton Cazares MD  Vascular Surgery

## 2019-01-17 DIAGNOSIS — J06.9 VIRAL URI WITH COUGH: ICD-10-CM

## 2019-01-17 DIAGNOSIS — E11.9 TYPE 2 DIABETES MELLITUS WITHOUT COMPLICATION, WITHOUT LONG-TERM CURRENT USE OF INSULIN (H): ICD-10-CM

## 2019-01-17 DIAGNOSIS — I10 HYPERTENSION GOAL BP (BLOOD PRESSURE) < 140/90: ICD-10-CM

## 2019-01-17 DIAGNOSIS — I71.40 ABDOMINAL AORTIC ANEURYSM (AAA) WITHOUT RUPTURE (H): ICD-10-CM

## 2019-01-18 RX ORDER — ATORVASTATIN CALCIUM 20 MG/1
20 TABLET, FILM COATED ORAL AT BEDTIME
Qty: 90 TABLET | Refills: 2 | Status: SHIPPED | OUTPATIENT
Start: 2019-01-18 | End: 2019-12-09

## 2019-01-18 RX ORDER — LOSARTAN POTASSIUM AND HYDROCHLOROTHIAZIDE 12.5; 5 MG/1; MG/1
1 TABLET ORAL DAILY
Qty: 90 TABLET | Refills: 2 | Status: SHIPPED | OUTPATIENT
Start: 2019-01-18 | End: 2019-06-19

## 2019-01-18 RX ORDER — GLIMEPIRIDE 1 MG/1
1 TABLET ORAL
Qty: 90 TABLET | Refills: 0 | Status: SHIPPED | OUTPATIENT
Start: 2019-01-18 | End: 2019-11-05

## 2019-01-18 RX ORDER — FLUTICASONE PROPIONATE 50 MCG
2 SPRAY, SUSPENSION (ML) NASAL 2 TIMES DAILY
Qty: 1 BOTTLE | Refills: 9 | Status: SHIPPED | OUTPATIENT
Start: 2019-01-18 | End: 2019-12-16

## 2019-01-18 NOTE — TELEPHONE ENCOUNTER
"Requested Prescriptions   Pending Prescriptions Disp Refills     metFORMIN (GLUCOPHAGE) 1000 MG tablet 180 tablet 3     Sig: Take 1 tablet (1,000 mg) by mouth 2 times daily (with meals)    Biguanide Agents Failed - 1/17/2019 11:11 AM       Failed - Blood pressure less than 140/90 in past 6 months    BP Readings from Last 3 Encounters:   01/11/19 153/83   12/20/18 124/76   10/25/18 142/83                Passed - Patient has documented LDL within the past 12 mos.    Recent Labs   Lab Test 10/08/18  0848   LDL 55            Passed - Patient has had a Microalbumin in the past 15 mos.    Recent Labs   Lab Test 11/16/17  0805   MICROL 22   UMALCR 9.19            Passed - Patient is age 10 or older       Passed - Patient has documented A1c within the specified period of time.    If HgbA1C is 8 or greater, it needs to be on file within the past 3 months.  If less than 8, must be on file within the past 6 months.     Recent Labs   Lab Test 10/08/18  0840   A1C 6.2*            Passed - Patient's CR is NOT>1.4 OR Patient's EGFR is NOT<45 within past 12 mos.    Recent Labs   Lab Test 01/11/19  0853   GFRESTIMATED 46*   GFRESTBLACK 55*       Recent Labs   Lab Test 09/11/18  0858   CR 1.34*            Passed - Patient does NOT have a diagnosis of CHF.       Passed - Medication is active on med list       Passed - Recent (6 mo) or future (30 days) visit within the authorizing provider's specialty    Patient had office visit in the last 6 months or has a visit in the next 30 days with authorizing provider or within the authorizing provider's specialty.  See \"Patient Info\" tab in inbasket, or \"Choose Columns\" in Meds & Orders section of the refill encounter.            losartan-hydrochlorothiazide (HYZAAR) 50-12.5 MG tablet 90 tablet 3     Sig: Take 1 tablet by mouth daily    Angiotensin-II Receptors Failed - 1/17/2019 11:11 AM       Failed - Blood pressure under 140/90 in past 12 months    BP Readings from Last 3 Encounters: " "  01/11/19 153/83   12/20/18 124/76   10/25/18 142/83                Failed - Normal serum creatinine on file in past 12 months    Recent Labs   Lab Test 01/11/19  0853 09/11/18  0858   CR  --  1.34*   CREAT 1.5*  --             Passed - Recent (12 mo) or future (30 days) visit within the authorizing provider's specialty    Patient had office visit in the last 12 months or has a visit in the next 30 days with authorizing provider or within the authorizing provider's specialty.  See \"Patient Info\" tab in inbasket, or \"Choose Columns\" in Meds & Orders section of the refill encounter.             Passed - Medication is active on med list       Passed - Patient is age 18 or older       Passed - Normal serum potassium on file in past 12 months    Recent Labs   Lab Test 09/11/18  0858   POTASSIUM 4.4                    glimepiride (AMARYL) 1 MG tablet 90 tablet 3     Sig: Take 1 tablet (1 mg) by mouth every morning (before breakfast)    Sulfonylurea Agents Failed - 1/17/2019 11:11 AM       Failed - Blood pressure less than 140/90 in past 6 months    BP Readings from Last 3 Encounters:   01/11/19 153/83   12/20/18 124/76   10/25/18 142/83                Failed - Patient has a recent creatinine (normal) within the past 12 mos.    Recent Labs   Lab Test 01/11/19  0853 09/11/18  0858   CR  --  1.34*   CREAT 1.5*  --             Passed - Patient has documented LDL within the past 12 mos.    Recent Labs   Lab Test 10/08/18  0848   LDL 55            Passed - Patient has had a Microalbumin in the past 12 mos.    Recent Labs   Lab Test 11/16/17  0805   MICROL 22   UMALCR 9.19            Passed - Patient has documented A1c within the specified period of time.    If HgbA1C is 8 or greater, it needs to be on file within the past 3 months.  If less than 8, must be on file within the past 6 months.     Recent Labs   Lab Test 10/08/18  0840   A1C 6.2*            Passed - Medication is active on med list       Passed - Patient is age 18 " "or older       Passed - Recent (6 mo) or future (30 days) visit within the authorizing provider's specialty    Patient had office visit in the last 6 months or has a visit in the next 30 days with authorizing provider or within the authorizing provider's specialty.  See \"Patient Info\" tab in inbasket, or \"Choose Columns\" in Meds & Orders section of the refill encounter.            fluticasone (FLONASE) 50 MCG/ACT nasal spray 1 Bottle 11     Sig: Spray 2 sprays into both nostrils 2 times daily    Inhaled Steroids Protocol Passed - 1/17/2019 11:11 AM       Passed - Patient is age 12 or older       Passed - Recent (12 mo) or future (30 days) visit within the authorizing provider's specialty    Patient had office visit in the last 12 months or has a visit in the next 30 days with authorizing provider or within the authorizing provider's specialty.  See \"Patient Info\" tab in inbasket, or \"Choose Columns\" in Meds & Orders section of the refill encounter.             Passed - Medication is active on med list        blood glucose (ONETOUCH ULTRA) test strip 100 each 0     Sig: Test 2-3 times daily as directed, brand per insurance formulary.    Diabetic Supplies Protocol Passed - 1/17/2019 11:11 AM       Passed - Medication is active on med list       Passed - Patient is 18 years of age or older       Passed - Recent (6 mo) or future (30 days) visit within the authorizing provider's specialty    Patient had office visit in the last 6 months or has a visit in the next 30 days with authorizing provider.  See \"Patient Info\" tab in inbasket, or \"Choose Columns\" in Meds & Orders section of the refill encounter.            atorvastatin (LIPITOR) 20 MG tablet 90 tablet 3     Sig: Take 1 tablet (20 mg) by mouth At Bedtime    Statins Protocol Passed - 1/17/2019 11:11 AM       Passed - LDL on file in past 12 months    Recent Labs   Lab Test 10/08/18  0848   LDL 55            Passed - No abnormal creatine kinase in past 12 months    No " "lab results found.            Passed - Recent (12 mo) or future (30 days) visit within the authorizing provider's specialty    Patient had office visit in the last 12 months or has a visit in the next 30 days with authorizing provider or within the authorizing provider's specialty.  See \"Patient Info\" tab in inbasket, or \"Choose Columns\" in Meds & Orders section of the refill encounter.             Passed - Medication is active on med list       Passed - Patient is age 18 or older        Last office visit 10/10/18  Prescription approved per [facility] Refill Protocol.    Lisandra Martinez RN    "

## 2019-01-22 DIAGNOSIS — I71.40 ABDOMINAL AORTIC ANEURYSM (AAA) WITHOUT RUPTURE (H): Primary | ICD-10-CM

## 2019-02-18 ENCOUNTER — TELEPHONE (OUTPATIENT)
Dept: OTHER | Facility: CLINIC | Age: 79
End: 2019-02-18

## 2019-02-18 NOTE — TELEPHONE ENCOUNTER
Type of surgery: ABDOMINAL AORTOGRAM TO EVALUATE FOR TYPE II ENDOLEAK WITH POSSIBLE EMBOLIZATION  Location of surgery: Other: [facility]  Date and time of surgery: 03/11/19 @ 8:00AM  Surgeon: DR. LORENZANA REQUESTING  Pre-Op Appt Date: PT TO SCHEDULE AT [facility]  Post-Op Appt Date: PT TO SCHEDULE   Packet sent out: GIVEN TO PT IN CLINIC ON 01/11/19  Pre-cert/Authorization completed:  Yes  Date: 02/18/19

## 2019-03-06 ENCOUNTER — OFFICE VISIT (OUTPATIENT)
Dept: PEDIATRICS | Facility: CLINIC | Age: 79
End: 2019-03-06
Payer: COMMERCIAL

## 2019-03-06 VITALS
HEIGHT: 68 IN | OXYGEN SATURATION: 99 % | TEMPERATURE: 97.8 F | DIASTOLIC BLOOD PRESSURE: 64 MMHG | WEIGHT: 213 LBS | BODY MASS INDEX: 32.28 KG/M2 | HEART RATE: 101 BPM | SYSTOLIC BLOOD PRESSURE: 134 MMHG

## 2019-03-06 DIAGNOSIS — C91.10 CLL (CHRONIC LYMPHOCYTIC LEUKEMIA) (H): ICD-10-CM

## 2019-03-06 DIAGNOSIS — E11.9 TYPE 2 DIABETES MELLITUS WITHOUT COMPLICATION, WITHOUT LONG-TERM CURRENT USE OF INSULIN (H): ICD-10-CM

## 2019-03-06 DIAGNOSIS — I71.40 ABDOMINAL AORTIC ANEURYSM (AAA) WITHOUT RUPTURE (H): ICD-10-CM

## 2019-03-06 DIAGNOSIS — Z01.818 PREOP GENERAL PHYSICAL EXAM: Primary | ICD-10-CM

## 2019-03-06 DIAGNOSIS — I10 HYPERTENSION GOAL BP (BLOOD PRESSURE) < 140/90: ICD-10-CM

## 2019-03-06 LAB
ALBUMIN SERPL-MCNC: 4.3 G/DL (ref 3.4–5)
ALP SERPL-CCNC: 49 U/L (ref 40–150)
ALT SERPL W P-5'-P-CCNC: 28 U/L (ref 0–70)
ANION GAP SERPL CALCULATED.3IONS-SCNC: 7 MMOL/L (ref 3–14)
AST SERPL W P-5'-P-CCNC: 22 U/L (ref 0–45)
BILIRUB SERPL-MCNC: 0.5 MG/DL (ref 0.2–1.3)
BUN SERPL-MCNC: 25 MG/DL (ref 7–30)
CALCIUM SERPL-MCNC: 8.9 MG/DL (ref 8.5–10.1)
CHLORIDE SERPL-SCNC: 108 MMOL/L (ref 94–109)
CO2 SERPL-SCNC: 27 MMOL/L (ref 20–32)
CREAT SERPL-MCNC: 1.43 MG/DL (ref 0.66–1.25)
ERYTHROCYTE [DISTWIDTH] IN BLOOD BY AUTOMATED COUNT: 16.8 % (ref 10–15)
GFR SERPL CREATININE-BSD FRML MDRD: 47 ML/MIN/{1.73_M2}
GLUCOSE SERPL-MCNC: 156 MG/DL (ref 70–99)
HBA1C MFR BLD: 5.8 % (ref 0–5.6)
HCT VFR BLD AUTO: 28.4 % (ref 40–53)
HGB BLD-MCNC: 9 G/DL (ref 13.3–17.7)
MCH RBC QN AUTO: 35 PG (ref 26.5–33)
MCHC RBC AUTO-ENTMCNC: 31.7 G/DL (ref 31.5–36.5)
MCV RBC AUTO: 111 FL (ref 78–100)
PLATELET # BLD AUTO: 106 10E9/L (ref 150–450)
POTASSIUM SERPL-SCNC: 4.2 MMOL/L (ref 3.4–5.3)
PROT SERPL-MCNC: 7.1 G/DL (ref 6.8–8.8)
RBC # BLD AUTO: 2.57 10E12/L (ref 4.4–5.9)
SODIUM SERPL-SCNC: 142 MMOL/L (ref 133–144)
WBC # BLD AUTO: 74.1 10E9/L (ref 4–11)

## 2019-03-06 PROCEDURE — 93000 ELECTROCARDIOGRAM COMPLETE: CPT | Performed by: INTERNAL MEDICINE

## 2019-03-06 PROCEDURE — 36415 COLL VENOUS BLD VENIPUNCTURE: CPT | Performed by: INTERNAL MEDICINE

## 2019-03-06 PROCEDURE — 99214 OFFICE O/P EST MOD 30 MIN: CPT | Performed by: INTERNAL MEDICINE

## 2019-03-06 PROCEDURE — 83036 HEMOGLOBIN GLYCOSYLATED A1C: CPT | Performed by: INTERNAL MEDICINE

## 2019-03-06 PROCEDURE — 85027 COMPLETE CBC AUTOMATED: CPT | Performed by: INTERNAL MEDICINE

## 2019-03-06 PROCEDURE — 80053 COMPREHEN METABOLIC PANEL: CPT | Performed by: INTERNAL MEDICINE

## 2019-03-06 ASSESSMENT — MIFFLIN-ST. JEOR: SCORE: 1670.66

## 2019-03-06 NOTE — PROGRESS NOTES
[facility]  [address]  [address]  [place] [id number] [phone]    PRE-OP EVALUATION:  Today's date: 3/6/2019    Austin Garza (: 1940) presents for pre-operative evaluation assessment as requested by .  He requires evaluation and anesthesia risk assessment prior to undergoing surgery/procedure for treatment of AAA.    Fax number for surgical facility: [facility]   Primary Physician: Sandi Eastman  Type of Anesthesia Anticipated: to be determined    Patient has a Health Care Directive or Living Will:  YES     Preop Questions 3/6/2019   Who is doing your surgery? Dr Tillman   What are you having done? AAA stent exploration   Date of Surgery/Procedure: 11 Mar   Facility or Hospital where procedure/surgery will be performed: [facility]   1.  Do you have a history of Heart attack, stroke, stent, coronary bypass surgery, or other heart surgery? No   2.  Do you ever have any pain or discomfort in your chest? No   3.  Do you have a history of  Heart Failure? No   4.   Are you troubled by shortness of breath when:  walking on a level surface, or up a slight hill, or at night? YES - chronic, felt d/t anemia   5.  Do you currently have a cold, bronchitis or other respiratory infection? No   6.  Do you have a cough, shortness of breath, or wheezing? No   7.  Do you sometimes get pains in the calves of your legs when you walk? No   8. Do you or anyone in your family have previous history of blood clots? No   9.  Do you or does anyone in your family have a serious bleeding problem such as prolonged bleeding following surgeries or cuts? No   10. Have you ever had problems with anemia or been told to take iron pills? YES - CLL   11. Have you had any abnormal blood loss such as black, tarry or bloody stools? No   12. Have you ever had a blood transfusion? No   13. Have you or any of your relatives ever had problems with anesthesia? No   14. Do you have sleep apnea,  excessive snoring or daytime drowsiness? No   15. Do you have any prosthetic heart valves? No   16. Do you have prosthetic joints? No       HPI:     HPI related to upcoming procedure: AAA w/ endovascular stent. Showing a persistent type II endoleak. Plan for abdominal aortogram with possible embolization.     CLL. Has led to chronic anemia, which is felt to be the cause for dyspnea on exertion. Reviewed lab results:  Lab Results   Component Value Date    HGB 9.7 12/19/2018    HGB 9.4 11/21/2018     Type 2 diabetes. Has been under good control. Reviewed labs:  Lab Results   Component Value Date    A1C 6.2 10/08/2018    A1C 6.6 12/04/2017       MEDICAL HISTORY:     Patient Active Problem List    Diagnosis Date Noted     Abdominal aortic aneurysm (AAA) without rupture (H) 11/29/2017     CLL (chronic lymphocytic leukemia) (H) 11/17/2017     BCC (basal cell carcinoma), face 10/23/2015     Excised 10/22/15. R temporal area.       Skin lesion of back 10/23/2015     Pre-cancerous. 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     Unrevealing arthrocentesis except for white cells       HYPERLIPIDEMIA LDL GOAL <100 02/10/2010     Type 2 diabetes mellitus without complication, without long-term current use of insulin (H) 01/21/2010     Diminished Peripheral Pulse 02/12/2007     Hypertension goal BP (blood pressure) < 140/90 12/02/2004     Hearing loss 07/31/2003     Dysfunction of eustachian tube 07/31/2003      Past Medical History:   Diagnosis Date     Diaphragmatic hernia without mention of obstruction or gangrene      Diverticulitis of colon (without mention of hemorrhage)(562.11)      Esophageal reflux      Irritable bowel syndrome      Macular degeneration (senile) of retina, unspecified      Squamous Cell Carcinoma, Back 1988     Type II or unspecified type diabetes mellitus without mention of complication,  not stated as uncontrolled      Unspecified essential hypertension      Past Surgical History:   Procedure Laterality Date     APPENDECTOMY  1966     BONE MARROW BIOPSY, BONE SPECIMEN, NEEDLE/TROCAR N/A 11/21/2017    Procedure: BIOPSY BONE MARROW;  BONE MARROW BIOPSY;  Surgeon: Shady Garcia MD;  Location:  GI     COLONOSCOPY       ENDOVASCULAR REPAIR ANEURYSM ABDOMINAL AORTA N/A 12/4/2017    Procedure: ENDOVASCULAR REPAIR ANEURYSM ABDOMINAL AORTA;  ENDOVASCULAR REPAIR ANEURYSM ABDOMINAL AORTA;  Surgeon: Milton Cazares MD;  Location:  OR     SURGICAL HISTORY OF -   1985-90    Squamous cell skin cancer resection - back     SURGICAL HISTORY OF -   1998    skin tags resection     SURGICAL HISTORY OF -   2/2001    stress thall. normal     SURGICAL HISTORY OF -   5/2002    colonoscopy     SURGICAL HISTORY OF -   2007    Fistulotomy with marsupialization for repair of fisture in ano     Current Outpatient Medications   Medication Sig Dispense Refill     aspirin 325 MG tablet Take 1 tablet (325 mg) by mouth every evening 90 tablet 3     atorvastatin (LIPITOR) 20 MG tablet Take 1 tablet (20 mg) by mouth At Bedtime 90 tablet 2     blood glucose (ONETOUCH ULTRA) test strip Test 2-3 times daily as directed, brand per insurance formulary. 100 each 1     Blood Glucose Monitoring Suppl (ONE TOUCH ULTRA 2) W/DEVICE KIT Use to test blood sugars 2-3 times daily as directed. Brand per insurance formulary   1 kit 0     fluticasone (FLONASE) 50 MCG/ACT nasal spray Spray 2 sprays into both nostrils 2 times daily 1 Bottle 9     glimepiride (AMARYL) 1 MG tablet Take 1 tablet (1 mg) by mouth every morning (before breakfast) 90 tablet 0     losartan-hydrochlorothiazide (HYZAAR) 50-12.5 MG tablet Take 1 tablet by mouth daily 90 tablet 2     metFORMIN (GLUCOPHAGE) 1000 MG tablet Take 1 tablet (1,000 mg) by mouth 2 times daily (with meals) 180 tablet 0     Multiple Vitamins-Minerals (CENTRUM SILVER) per tablet Take 1  "tablet by mouth daily         Allergies   Allergen Reactions     Ace Inhibitors      cough      Latex Allergy: NO    Social History     Tobacco Use     Smoking status: Former Smoker     Packs/day: 0.50     Years: 20.00     Pack years: 10.00     Last attempt to quit: 1975     Years since quittin.2     Smokeless tobacco: Former User   Substance Use Topics     Alcohol use: Yes     Alcohol/week: 6.0 - 8.4 oz     Types: 10 - 14 Standard drinks or equivalent per week     Comment: 2 drinks a week     History   Drug Use No       REVIEW OF SYSTEMS:   Constitutional, HEENT, cardiovascular, pulmonary, gi and gu systems are negative, except as otherwise noted.    EXAM:   /64   Pulse 101   Temp 97.8  F (36.6  C) (Tympanic)   Ht 1.727 m (5' 8\")   Wt 96.6 kg (213 lb)   SpO2 99%   BMI 32.39 kg/m      GENERAL APPEARANCE: healthy, alert and no distress     EYES: EOMI,  PERRL     HENT: ear canals and TM's normal and nose and mouth without ulcers or lesions     NECK: no adenopathy, no asymmetry, masses, or scars and thyroid normal to palpation     RESP: lungs clear to auscultation - no rales, rhonchi or wheezes     CV: regular rates and underlying rhythm with occasional early systoles, normal S1 S2, no S3 or S4 and no murmur, click or rub     ABDOMEN:  soft, nontender, bowel sounds normal     MS: extremities normal- no gross deformities noted, no evidence of inflammation in joints, FROM in all extremities.     SKIN: no suspicious lesions or rashes     NEURO: Normal strength and tone, sensory exam grossly normal, mentation intact and speech normal     PSYCH: mentation appears normal. and affect normal/bright     LYMPHATICS: No cervical adenopathy    DIAGNOSTICS:     Results for orders placed or performed in visit on 19   Comprehensive metabolic panel   Result Value Ref Range    Sodium 142 133 - 144 mmol/L    Potassium 4.2 3.4 - 5.3 mmol/L    Chloride 108 94 - 109 mmol/L    Carbon Dioxide 27 20 - 32 mmol/L    " Anion Gap 7 3 - 14 mmol/L    Glucose 156 (H) 70 - 99 mg/dL    Urea Nitrogen 25 7 - 30 mg/dL    Creatinine 1.43 (H) 0.66 - 1.25 mg/dL    GFR Estimate 47 (L) >60 mL/min/[1.73_m2]    GFR Estimate If Black 55 (L) >60 mL/min/[1.73_m2]    Calcium 8.9 8.5 - 10.1 mg/dL    Bilirubin Total 0.5 0.2 - 1.3 mg/dL    Albumin 4.3 3.4 - 5.0 g/dL    Protein Total 7.1 6.8 - 8.8 g/dL    Alkaline Phosphatase 49 40 - 150 U/L    ALT 28 0 - 70 U/L    AST 22 0 - 45 U/L   Hemoglobin A1c   Result Value Ref Range    Hemoglobin A1C 5.8 (H) 0 - 5.6 %   CBC with platelets   Result Value Ref Range    WBC 74.1 (HH) 4.0 - 11.0 10e9/L    RBC Count 2.57 (L) 4.4 - 5.9 10e12/L    Hemoglobin 9.0 (L) 13.3 - 17.7 g/dL    Hematocrit 28.4 (L) 40.0 - 53.0 %     (H) 78 - 100 fl    MCH 35.0 (H) 26.5 - 33.0 pg    MCHC 31.7 31.5 - 36.5 g/dL    RDW 16.8 (H) 10.0 - 15.0 %    Platelet Count 106 (L) 150 - 450 10e9/L     EKG: Normal Sinus Rhythm, occasional PAC, normal axis, normal intervals, no acute ST/T changes c/w ischemia, no LVH by voltage criteria, unchanged from previous tracings    Recent Labs   Lab Test 12/19/18  0828 11/21/18  1434  10/08/18  0840 09/11/18  0858 08/22/18  0839  12/04/17  0625   HGB 9.7* 9.4*   < >  --  9.8* 9.8*   < > 10.7*   * 105*   < >  --  111* 106*   < >  --    NA  --   --   --   --  142 137   < >  --    POTASSIUM  --   --   --   --  4.4 5.1   < > 4.2   CR  --   --   --   --  1.34* 1.30*   < > 1.11   A1C  --   --   --  6.2*  --   --   --  6.6*    < > = values in this interval not displayed.        IMPRESSION:   Reason for surgery/procedure: AAA with endoleak    The proposed surgical procedure is considered INTERMEDIATE risk.    REVISED CARDIAC RISK INDEX  The patient has the following serious cardiovascular risks for perioperative complications such as (MI, PE, VFib and 3  AV Block):  No serious cardiac risks      ICD-10-CM    1. Preop general physical exam Z01.818 EKG 12-lead complete w/read - Clinics     Comprehensive  metabolic panel     Hemoglobin A1c     CBC with platelets   2. Abdominal aortic aneurysm (AAA) without rupture (H) I71.4 Comprehensive metabolic panel     Hemoglobin A1c     CBC with platelets   3. CLL (chronic lymphocytic leukemia) (H) C91.90 CBC with platelets   4. Type 2 diabetes mellitus without complication, without long-term current use of insulin (H) E11.9 Comprehensive metabolic panel     Hemoglobin A1c   5. Hypertension goal BP (blood pressure) < 140/90 I10 Comprehensive metabolic panel       RECOMMENDATIONS:     APPROVAL GIVEN to proceed with proposed procedure, without further diagnostic evaluation    Patient Instructions       Call your surgeon if there is any change in your health. This includes signs of a cold or flu (such as a sore throat, runny nose, cough, rash or fever).    Do not smoke, drink alcohol or take over the counter medicine (unless your surgeon or primary care doctor tells you to) for the 24 hours before and after surgery.    Prescribed medications:  o Stop aspirin until after your procedure  o Do take losartan the morning of the procedure  o Do not take your diabetes medications the morning of the procedure    Eating and drinking prior to surgery: follow the instructions from your surgeon    Signed Electronically by: Julio Dalton MD

## 2019-03-11 ENCOUNTER — TELEPHONE (OUTPATIENT)
Dept: OTHER | Facility: CLINIC | Age: 79
End: 2019-03-11

## 2019-03-11 ENCOUNTER — APPOINTMENT (OUTPATIENT)
Dept: INTERVENTIONAL RADIOLOGY/VASCULAR | Facility: CLINIC | Age: 79
End: 2019-03-11
Attending: RADIOLOGY
Payer: COMMERCIAL

## 2019-03-11 ENCOUNTER — HOSPITAL ENCOUNTER (OUTPATIENT)
Facility: CLINIC | Age: 79
Discharge: HOME | End: 2019-03-11
Attending: RADIOLOGY | Admitting: RADIOLOGY
Payer: COMMERCIAL

## 2019-03-11 VITALS
HEIGHT: 69 IN | RESPIRATION RATE: 16 BRPM | WEIGHT: 212.2 LBS | DIASTOLIC BLOOD PRESSURE: 75 MMHG | SYSTOLIC BLOOD PRESSURE: 149 MMHG | TEMPERATURE: 97.7 F | OXYGEN SATURATION: 97 % | HEART RATE: 88 BPM | BODY MASS INDEX: 31.43 KG/M2

## 2019-03-11 DIAGNOSIS — I71.40 ABDOMINAL AORTIC ANEURYSM (AAA) WITHOUT RUPTURE (H): ICD-10-CM

## 2019-03-11 DIAGNOSIS — I77.70 ARTERY DISSECTION (H): Primary | ICD-10-CM

## 2019-03-11 DIAGNOSIS — S35.229A: Primary | ICD-10-CM

## 2019-03-11 LAB
APTT PPP: 27 SEC (ref 22–37)
CHOLEST SERPL-MCNC: 104 MG/DL
CREAT SERPL-MCNC: 1.38 MG/DL (ref 0.66–1.25)
ERYTHROCYTE [DISTWIDTH] IN BLOOD BY AUTOMATED COUNT: 17.5 % (ref 10–15)
GFR SERPL CREATININE-BSD FRML MDRD: 49 ML/MIN/{1.73_M2}
HBA1C MFR BLD: 5.9 % (ref 0–5.6)
HCT VFR BLD AUTO: 27.6 % (ref 40–53)
HDLC SERPL-MCNC: 33 MG/DL
HGB BLD-MCNC: 9.3 G/DL (ref 13.3–17.7)
INR PPP: 1.03 (ref 0.86–1.14)
LDLC SERPL CALC-MCNC: 52 MG/DL
MCH RBC QN AUTO: 35 PG (ref 26.5–33)
MCHC RBC AUTO-ENTMCNC: 33.7 G/DL (ref 31.5–36.5)
MCV RBC AUTO: 104 FL (ref 78–100)
NONHDLC SERPL-MCNC: 71 MG/DL
PLATELET # BLD AUTO: 110 10E9/L (ref 150–450)
RBC # BLD AUTO: 2.66 10E12/L (ref 4.4–5.9)
TRIGL SERPL-MCNC: 97 MG/DL
WBC # BLD AUTO: 76.7 10E9/L (ref 4–11)

## 2019-03-11 PROCEDURE — 27210906 ZZH KIT CR8

## 2019-03-11 PROCEDURE — 80061 LIPID PANEL: CPT | Performed by: RADIOLOGY

## 2019-03-11 PROCEDURE — 36415 COLL VENOUS BLD VENIPUNCTURE: CPT

## 2019-03-11 PROCEDURE — 36415 COLL VENOUS BLD VENIPUNCTURE: CPT | Performed by: RADIOLOGY

## 2019-03-11 PROCEDURE — 82565 ASSAY OF CREATININE: CPT | Performed by: RADIOLOGY

## 2019-03-11 PROCEDURE — 83036 HEMOGLOBIN GLYCOSYLATED A1C: CPT | Performed by: RADIOLOGY

## 2019-03-11 PROCEDURE — 25000125 ZZHC RX 250: Performed by: RADIOLOGY

## 2019-03-11 PROCEDURE — 27210746 ZZH CATH CR16

## 2019-03-11 PROCEDURE — 25000128 H RX IP 250 OP 636

## 2019-03-11 PROCEDURE — 85027 COMPLETE CBC AUTOMATED: CPT | Performed by: RADIOLOGY

## 2019-03-11 PROCEDURE — C1760 CLOSURE DEV, VASC: HCPCS

## 2019-03-11 PROCEDURE — C1887 CATHETER, GUIDING: HCPCS

## 2019-03-11 PROCEDURE — 85610 PROTHROMBIN TIME: CPT | Performed by: RADIOLOGY

## 2019-03-11 PROCEDURE — C1769 GUIDE WIRE: HCPCS

## 2019-03-11 PROCEDURE — 25500064 ZZH RX 255 OP 636: Performed by: RADIOLOGY

## 2019-03-11 PROCEDURE — 40000853 ZZH STATISTIC ANGIOGRAM, STENT, VERTEBRO PLASTY

## 2019-03-11 PROCEDURE — 36246 INS CATH ABD/L-EXT ART 2ND: CPT

## 2019-03-11 PROCEDURE — 27210886 ZZH ACCESSORY CR5

## 2019-03-11 PROCEDURE — 25000128 H RX IP 250 OP 636: Performed by: RADIOLOGY

## 2019-03-11 PROCEDURE — 99214 OFFICE O/P EST MOD 30 MIN: CPT | Performed by: INTERNAL MEDICINE

## 2019-03-11 PROCEDURE — 25800030 ZZH RX IP 258 OP 636: Performed by: RADIOLOGY

## 2019-03-11 PROCEDURE — 27210845 ZZH DEVICE INFLATION CR5

## 2019-03-11 PROCEDURE — 85730 THROMBOPLASTIN TIME PARTIAL: CPT | Performed by: RADIOLOGY

## 2019-03-11 PROCEDURE — 27210742 ZZH CATH CR1

## 2019-03-11 PROCEDURE — 27210808 ZZH SHEATH CR7

## 2019-03-11 RX ORDER — HEPARIN SODIUM 1000 [USP'U]/ML
500-6000 INJECTION, SOLUTION INTRAVENOUS; SUBCUTANEOUS
Status: COMPLETED | OUTPATIENT
Start: 2019-03-11 | End: 2019-03-11

## 2019-03-11 RX ORDER — LIDOCAINE 40 MG/G
CREAM TOPICAL
Status: DISCONTINUED | OUTPATIENT
Start: 2019-03-11 | End: 2019-03-11 | Stop reason: HOSPADM

## 2019-03-11 RX ORDER — SODIUM CHLORIDE 9 MG/ML
INJECTION, SOLUTION INTRAVENOUS CONTINUOUS
Status: DISCONTINUED | OUTPATIENT
Start: 2019-03-11 | End: 2019-03-11 | Stop reason: HOSPADM

## 2019-03-11 RX ORDER — HEPARIN SODIUM 1000 [USP'U]/ML
INJECTION, SOLUTION INTRAVENOUS; SUBCUTANEOUS
Status: COMPLETED
Start: 2019-03-11 | End: 2019-03-11

## 2019-03-11 RX ORDER — DEXTROSE MONOHYDRATE 25 G/50ML
25-50 INJECTION, SOLUTION INTRAVENOUS
Status: DISCONTINUED | OUTPATIENT
Start: 2019-03-11 | End: 2019-03-11 | Stop reason: HOSPADM

## 2019-03-11 RX ORDER — LIDOCAINE HYDROCHLORIDE 10 MG/ML
INJECTION, SOLUTION INFILTRATION; PERINEURAL
Status: DISCONTINUED
Start: 2019-03-11 | End: 2019-03-11 | Stop reason: HOSPADM

## 2019-03-11 RX ORDER — NALOXONE HYDROCHLORIDE 0.4 MG/ML
.1-.4 INJECTION, SOLUTION INTRAMUSCULAR; INTRAVENOUS; SUBCUTANEOUS
Status: DISCONTINUED | OUTPATIENT
Start: 2019-03-11 | End: 2019-03-11 | Stop reason: HOSPADM

## 2019-03-11 RX ORDER — NITROGLYCERIN 5 MG/ML
100-500 VIAL (ML) INTRAVENOUS EVERY 5 MIN PRN
Status: DISCONTINUED | OUTPATIENT
Start: 2019-03-11 | End: 2019-03-11 | Stop reason: HOSPADM

## 2019-03-11 RX ORDER — IOPAMIDOL 612 MG/ML
150 INJECTION, SOLUTION INTRAVASCULAR ONCE
Status: COMPLETED | OUTPATIENT
Start: 2019-03-11 | End: 2019-03-11

## 2019-03-11 RX ORDER — FLUMAZENIL 0.1 MG/ML
0.2 INJECTION, SOLUTION INTRAVENOUS
Status: DISCONTINUED | OUTPATIENT
Start: 2019-03-11 | End: 2019-03-11 | Stop reason: HOSPADM

## 2019-03-11 RX ORDER — NICOTINE POLACRILEX 4 MG
15-30 LOZENGE BUCCAL
Status: DISCONTINUED | OUTPATIENT
Start: 2019-03-11 | End: 2019-03-11 | Stop reason: HOSPADM

## 2019-03-11 RX ORDER — CLOPIDOGREL BISULFATE 75 MG/1
75 TABLET ORAL DAILY
Qty: 90 TABLET | Refills: 1 | Status: SHIPPED | OUTPATIENT
Start: 2019-03-11 | End: 2019-09-08

## 2019-03-11 RX ORDER — FENTANYL CITRATE 50 UG/ML
25-50 INJECTION, SOLUTION INTRAMUSCULAR; INTRAVENOUS EVERY 5 MIN PRN
Status: DISCONTINUED | OUTPATIENT
Start: 2019-03-11 | End: 2019-03-11 | Stop reason: HOSPADM

## 2019-03-11 RX ORDER — FENTANYL CITRATE 50 UG/ML
INJECTION, SOLUTION INTRAMUSCULAR; INTRAVENOUS
Status: COMPLETED
Start: 2019-03-11 | End: 2019-03-11

## 2019-03-11 RX ORDER — ACETAMINOPHEN 500 MG
500 TABLET ORAL EVERY 6 HOURS PRN
Status: DISCONTINUED | OUTPATIENT
Start: 2019-03-11 | End: 2019-03-11 | Stop reason: HOSPADM

## 2019-03-11 RX ADMIN — MIDAZOLAM HYDROCHLORIDE 0.5 MG: 1 INJECTION, SOLUTION INTRAMUSCULAR; INTRAVENOUS at 10:01

## 2019-03-11 RX ADMIN — HEPARIN SODIUM 3000 UNITS: 1000 INJECTION INTRAVENOUS; SUBCUTANEOUS at 09:17

## 2019-03-11 RX ADMIN — HEPARIN SODIUM 2000 UNITS: 1000 INJECTION INTRAVENOUS; SUBCUTANEOUS at 09:43

## 2019-03-11 RX ADMIN — HEPARIN SODIUM 10000 UNITS: 10000 INJECTION INTRAVENOUS; SUBCUTANEOUS at 08:15

## 2019-03-11 RX ADMIN — MIDAZOLAM HYDROCHLORIDE 0.5 MG: 1 INJECTION, SOLUTION INTRAMUSCULAR; INTRAVENOUS at 09:30

## 2019-03-11 RX ADMIN — MIDAZOLAM HYDROCHLORIDE 1 MG: 1 INJECTION, SOLUTION INTRAMUSCULAR; INTRAVENOUS at 08:21

## 2019-03-11 RX ADMIN — FENTANYL CITRATE 25 MCG: 50 INJECTION, SOLUTION INTRAMUSCULAR; INTRAVENOUS at 08:45

## 2019-03-11 RX ADMIN — MIDAZOLAM HYDROCHLORIDE 0.5 MG: 1 INJECTION, SOLUTION INTRAMUSCULAR; INTRAVENOUS at 09:02

## 2019-03-11 RX ADMIN — MIDAZOLAM HYDROCHLORIDE 0.5 MG: 1 INJECTION, SOLUTION INTRAMUSCULAR; INTRAVENOUS at 08:45

## 2019-03-11 RX ADMIN — MIDAZOLAM HYDROCHLORIDE 0.5 MG: 1 INJECTION, SOLUTION INTRAMUSCULAR; INTRAVENOUS at 08:29

## 2019-03-11 RX ADMIN — FENTANYL CITRATE 25 MCG: 50 INJECTION, SOLUTION INTRAMUSCULAR; INTRAVENOUS at 08:29

## 2019-03-11 RX ADMIN — IOPAMIDOL 110 ML: 612 INJECTION, SOLUTION INTRAVENOUS at 10:27

## 2019-03-11 RX ADMIN — FENTANYL CITRATE 50 MCG: 50 INJECTION, SOLUTION INTRAMUSCULAR; INTRAVENOUS at 08:20

## 2019-03-11 RX ADMIN — HEPARIN SODIUM 3000 UNITS: 1000 INJECTION, SOLUTION INTRAVENOUS; SUBCUTANEOUS at 09:17

## 2019-03-11 RX ADMIN — SODIUM CHLORIDE: 9 INJECTION, SOLUTION INTRAVENOUS at 07:18

## 2019-03-11 RX ADMIN — HEPARIN SODIUM 10000 UNITS: 10000 INJECTION INTRAVENOUS; SUBCUTANEOUS at 08:29

## 2019-03-11 RX ADMIN — LIDOCAINE HYDROCHLORIDE 10 ML: 10 INJECTION, SOLUTION INFILTRATION; PERINEURAL at 08:26

## 2019-03-11 ASSESSMENT — MIFFLIN-ST. JEOR: SCORE: 1682.91

## 2019-03-11 NOTE — TELEPHONE ENCOUNTER
Attempted embolization for Type II endoleak done today.  SMA dissection noted.  Patient needs to be on Plavix for 1 month.  Then will have CTA abdomen         (mesenteric) to evaluate SMA.  Need to see patient in clinic at 1 month to discuss CTA and to schedule date for embolization of Type II via direct puncture.  Will need to hold Plavix for procedure.    Will route to IR .  Lisette Hinojosa RN  IR nurse clinician  [phone]

## 2019-03-11 NOTE — DISCHARGE INSTRUCTIONS
Aortagram Discharge Instructions - Femoral     After you go home:      Have an adult stay with you until tomorrow.    Drink extra fluids for 2 days.    You may resume your normal diet.    No smoking       For 24 hours - due to the sedation you received:    Relax and take it easy.    Do NOT make any important or legal decisions.    Do NOT drive or operate machines at home or at work.    Do NOT drink alcohol.    Care of Groin Puncture Site:      For the first 24 hrs - check the puncture site every 1-2 hours while awake.    For 2 days, when you cough, sneeze, laugh or move your bowels, hold your hand over the puncture site and press firmly.    Remove the bandaid after 24 hours. If there is minor oozing, apply another bandaid and remove it after 12 hours.    It is normal to have a small bruise or pea size lump at the site.    You may shower tomorrow.  Do NOT take a bath, or use a hot tub or pool for at least 3 days. Do NOT scrub the site. Do not use lotion or powder near the puncture site.     Activity:            For 2 days:    No stooping or squatting    Do NOT do any heavy activity such as exercise, lifting, or straining.     No housework, yard work or any activity that make you sweat    Do NOT lift more than 10 pounds    Bleeding:      If you start bleeding from the site in your groin, lie down flat and press firmly on/above the site for 10 minutes.     Once bleeding stops, lay flat for 2 hours.     [facility] as soon as you can.       Call 911 right away if you have heavy bleeding or bleeding that does not stop.      Medicines:      If you are on Metformin (Glucophage) and your GFR (kidney function level) is >30, you may continue taking your Metformin.  Your GFR is 49 today, continue to take your metformin.    If you are taking an antiplatelet medication such as Plavix, do not stop taking it until you talk to your provider.       Take your medications, including blood thinners, unless your  provider tells you not to.  If you take Coumadin (Warfarin), have your INR checked by your provider in  3-5 days. Call your clinic to schedule this.    If you have stopped any medicines, check with your provider about when to restart them.        Follow Up Appointments:      Follow up with [facility] as directed.    Call the clinic if:      You have increased pain or a large or growing hard lump around the site.    The site is red, swollen, hot or tender.    Blood or fluid is draining from the site.    You have chills or a fever greater than 101 F (38 C).    Your leg feels numb, cool or changes color.    You have hives, a rash or unusual itching.    New pain in the back or belly that you cannot control with Tylenol.    Any questions or concerns.    Other Instructions:      If you have questions or your original symptoms do not improve, call:         [facility] @ [phone]

## 2019-03-11 NOTE — PROGRESS NOTES
Interventional Radiology Intra-procedural Nursing Note    Patient Name: Austin Garza  Medical Record Number: [id number]  Today's Date: March 11, 2019    Start Time: 0825  End of procedure time: 1025  Procedure: Aortogram  Report given to: Laura BARKSDALE, Care Suites  Time pt departs:  1030      Other Notes: Patient in IR2 for aortogram and possible embolization of endoleak. Vital signs, allergies, and labs reviewed prior to procedure. Consent signed with Dr. Gauthier. Pedal pulses present with doppler bilaterally.    5F sheath placed in R femoral artery at 0830.  5000 units heparin given intra-procedure.   6F Angioseal deployed in R femoral artery and sheath removed at 1020. Patient tolerated procedure, site C/D/I, no hematoma. Pulses present with doppler bilaterally    3.5 mg midazolam and 100 mcg fentanyl given for sedation.     Richa Sage, RN

## 2019-03-11 NOTE — IP AVS SNAPSHOT
MRN:[id number]                      After Visit Summary   3/11/2019    Austin Garza    MRN: [id number]           Visit Information        Department      3/11/2019  6:23 AM [facility]          Review of your medicines      START taking       Dose / Directions   clopidogrel 75 MG tablet  Commonly known as:  PLAVIX  Used for:  Artery dissection (H)      Dose:  75 mg  Take 1 tablet (75 mg) by mouth daily  Quantity:  90 tablet  Refills:  1        CONTINUE these medicines which may have CHANGED, or have new prescriptions. If we are uncertain of the size of tablets/capsules you have at home, strength may be listed as something that might have changed.       Dose / Directions   aspirin 81 MG tablet  Commonly known as:  ASA  This may have changed:    medication strength  how much to take  Used for:  Abdominal aortic aneurysm (AAA) without rupture (H)      Dose:  81 mg  Take 1 tablet (81 mg) by mouth every evening  Quantity:  90 tablet  Refills:  1        CONTINUE these medicines which have NOT CHANGED       Dose / Directions   atorvastatin 20 MG tablet  Commonly known as:  LIPITOR  Used for:  Type 2 diabetes mellitus without complication, without long-term current use of insulin (H)      Dose:  20 mg  Take 1 tablet (20 mg) by mouth At Bedtime  Quantity:  90 tablet  Refills:  2     blood glucose monitoring meter device kit  Used for:  Type II or unspecified type diabetes mellitus without mention of complication, not stated as uncontrolled      Use to test blood sugars 2-3 times daily as directed. Brand per insurance formulary  Quantity:  1 kit  Refills:  0     blood glucose test strip  Commonly known as:  ONETOUCH ULTRA  Used for:  Type 2 diabetes mellitus without complication, without long-term current use of insulin (H)      Test 2-3 times daily as directed, brand per insurance formulary.  Quantity:  100 each  Refills:  1     fluticasone 50 MCG/ACT nasal spray  Commonly known as:   FLONASE  Used for:  Viral URI with cough      Dose:  2 spray  Spray 2 sprays into both nostrils 2 times daily  Quantity:  1 Bottle  Refills:  9     glimepiride 1 MG tablet  Commonly known as:  AMARYL  Used for:  Type 2 diabetes mellitus without complication, without long-term current use of insulin (H)      Dose:  1 mg  Take 1 tablet (1 mg) by mouth every morning (before breakfast)  Quantity:  90 tablet  Refills:  0     losartan-hydrochlorothiazide 50-12.5 MG tablet  Commonly known as:  HYZAAR  Used for:  Hypertension goal BP (blood pressure) < 140/90      Dose:  1 tablet  Take 1 tablet by mouth daily  Quantity:  90 tablet  Refills:  2     metFORMIN 1000 MG tablet  Commonly known as:  GLUCOPHAGE  Used for:  Type 2 diabetes mellitus without complication, without long-term current use of insulin (H)      Dose:  1000 mg  Take 1 tablet (1,000 mg) by mouth 2 times daily (with meals)  Quantity:  180 tablet  Refills:  0     multivitamin tablet      Dose:  1 tablet  Take 1 tablet by mouth daily  Refills:  0           Where to get your medicines      These medications were sent to [facility] [place] - [address]  [address], [facility] [address]    Phone:  [phone]   aspirin 81 MG tablet  clopidogrel 75 MG tablet           Prescriptions were sent or printed at these locations (2 Prescriptions)            [facility] [place] - [address]   [address], [facility] [address]    Telephone:  [phone]   Fax:  [phone]   Hours:                  E-Prescribed (2 of 2)         aspirin (ASA) 81 MG tablet               clopidogrel (PLAVIX) 75 MG tablet                Protect others around you: Learn how to safely use, store and throw away your medicines at www.disposemymeds.org.       Follow-ups after your visit       Your next 10 appointments already scheduled    Mar 13, 2019  8:30 AM CDT  LAB with [facility]  [facility] [place] ([facility]  [place]) [address]  [address]  [place] [address] [phone]   Please do not eat 10-12 hours before your appointment if you are coming in fasting for labs on lipids, cholesterol, or glucose (sugar). Does not apply to pregnant women. Water, tea and black coffee (with nothing added) is okay. Do not drink other fluids, diet soda or gum. If you have concerns about taking your medications, please send a message by clicking on Secure Messaging, Message Your Care Team.   Mar 14, 2019  3:15 PM CDT  Return Visit with Breana Perry MD  [facility] ([facility]  [address]  [facility] [address] [phone]      Care Instructions       Further instructions from your care team       [facility] Discharge Instructions - Femoral     After you go home:      Have an adult stay with you until tomorrow.    Drink extra fluids for 2 days.    You may resume your normal diet.    No smoking       For 24 hours - due to the sedation you received:    Relax and take it easy.    Do NOT make any important or legal decisions.    Do NOT drive or operate machines at home or at work.    Do NOT drink alcohol.    Care of Groin Puncture Site:      For the first 24 hrs - check the puncture site every 1-2 hours while awake.    For 2 days, when you cough, sneeze, laugh or move your bowels, hold your hand over the puncture site and press firmly.    Remove the bandaid after 24 hours. If there is minor oozing, apply another bandaid and remove it after 12 hours.    It is normal to have a small bruise or pea size lump at the site.    You may shower tomorrow.  Do NOT take a bath, or use a hot tub or pool for at least 3 days. Do NOT scrub the site. Do not use lotion or powder near the puncture site.     Activity:            For 2 days:    No stooping or squatting    Do NOT do any heavy activity such as exercise, lifting, or straining.      No housework, yard work or any activity that make you sweat    Do NOT lift more than 10 pounds    Bleeding:      If you start bleeding from the site in your groin, lie down flat and press firmly on/above the site for 10 minutes.     Once bleeding stops, lay flat for 2 hours.     Call the [facility] as soon as you can.       Call 911 right away if you have heavy bleeding or bleeding that does not stop.      Medicines:      If you are on Metformin (Glucophage) and your GFR (kidney function level) is >30, you may continue taking your Metformin.  Your GFR is 49 today, continue to take your metformin.    If you are taking an antiplatelet medication such as Plavix, do not stop taking it until you talk to your provider.       Take your medications, including blood thinners, unless your provider tells you not to.  If you take Coumadin (Warfarin), have your INR checked by your provider in  3-5 days. Call your clinic to schedule this.    If you have stopped any medicines, check with your provider about when to restart them.        Follow Up Appointments:      Follow up with [facility] as directed.    Call the clinic if:      You have increased pain or a large or growing hard lump around the site.    The site is red, swollen, hot or tender.    Blood or fluid is draining from the site.    You have chills or a fever greater than 101 F (38 C).    Your leg feels numb, cool or changes color.    You have hives, a rash or unusual itching.    New pain in the back or belly that you cannot control with Tylenol.    Any questions or concerns.    Other Instructions:      If you have questions or your original symptoms do not improve, call:         [facility] @ [phone]        Additional Information About Your Visit       [facility] Information    [employer] gives you secure access to your electronic health record. If you see a primary care provider, you can also send messages to your care team and make  "appointments. If you have questions, please call your primary care clinic.  If you do not have a primary care provider, please call [phone] and they will assist you.       Care EveryWhere ID    This is your Care EveryWhere ID. This could be used by other organizations to access your [facility] medical records  [id number]       Your Vitals Were     Blood Pressure   148/78          Pulse   92          Temperature   97.7  F (36.5  C) (Oral)          Respirations   16          Height   1.753 m (5' 9\")             Weight   96.3 kg (212 lb 3.2 oz)    Pulse Oximetry   97%    BMI (Body Mass Index)   31.34 kg/m           Primary Care Provider Office Phone # Fax #    Sandi Eastman [phone]      Equal Access to Services    BAKARI OSMAN : Hadii gertrude pachecoo Solilian, waaxda luqadaha, qaybta kaalmada adeegyada, mina bennett . So [facility] [phone].    ATENCIÓN: Si habla español, tiene a edward disposición servicios gratuitos de asistencia lingüística. Llame al [phone].    We comply with applicable federal civil rights laws and [place] laws. We do not discriminate on the basis of race, color, national origin, age, disability, sex, sexual orientation, or gender identity.           Thank you!    Thank you for choosing [facility] for your care. Our goal is always to provide you with excellent care. Hearing back from our patients is one way we can continue to improve our services. Please take a few minutes to complete the written survey that you may receive in the mail after you visit with us. Thank you!            Medication List      Medications          Morning Afternoon Evening Bedtime As Needed    aspirin 81 MG tablet  Also known as:  ASA  INSTRUCTIONS:  Take 1 tablet (81 mg) by mouth every evening  What changed:    medication strength  how much to take                     atorvastatin 20 MG tablet  Also known as:  LIPITOR  INSTRUCTIONS:  Take 1 tablet (20 mg) by mouth At " Bedtime                     blood glucose monitoring meter device kit  INSTRUCTIONS:  Use to test blood sugars 2-3 times daily as directed. Brand per insurance formulary                     blood glucose test strip  Also known as:  ONETOUCH ULTRA  INSTRUCTIONS:  Test 2-3 times daily as directed, brand per insurance formulary.                     clopidogrel 75 MG tablet  Also known as:  PLAVIX  INSTRUCTIONS:  Take 1 tablet (75 mg) by mouth daily                     fluticasone 50 MCG/ACT nasal spray  Also known as:  FLONASE  INSTRUCTIONS:  Spray 2 sprays into both nostrils 2 times daily                     glimepiride 1 MG tablet  Also known as:  AMARYL  INSTRUCTIONS:  Take 1 tablet (1 mg) by mouth every morning (before breakfast)                     losartan-hydrochlorothiazide 50-12.5 MG tablet  Also known as:  HYZAAR  INSTRUCTIONS:  Take 1 tablet by mouth daily                     metFORMIN 1000 MG tablet  Also known as:  GLUCOPHAGE  INSTRUCTIONS:  Take 1 tablet (1,000 mg) by mouth 2 times daily (with meals)                     multivitamin tablet  INSTRUCTIONS:  Take 1 tablet by mouth daily

## 2019-03-11 NOTE — PROGRESS NOTES
return to CS 19 at 1030.  Alert, denies pain.  Rt groin site CDI, soft and flat.  Angioseal to access site.  VSS at baseline, call light in reach.  Awaiting orders and MD to talk with pt and wife.  Will cont to monitor

## 2019-03-11 NOTE — PROCEDURES
RADIOLOGY POST PROCEDURE NOTE w/ SEDATION  Patient name: Austin Garza  MRN: [id number]  : 1940    Pre-procedure diagnosis: Endoleak  Post-procedure diagnosis: Same    Procedure Date/Time: 2019  10:32 AM  Procedure:   SMA angiogram  Angioplasty SMA    Estimated blood loss: None  Specimen(s) collected with description: none    I determined this patient to be an appropriate candidate for the planned sedation and procedure and reassessed the patient IMMEDIATELY PRIOR to sedation and procedure.     The patient tolerated the procedure well with no immediate complications.  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    Provider name: Julius Gauthier  Assistant(s):None

## 2019-03-11 NOTE — PROGRESS NOTES
MD notified of dressing change and pressure needed to access site.  Will continue bedrest until 7184-9958.  Thea Samaniego to see pt.  Groin site now soft and flat, very small area on edge of gauze with min drainage.  Will cont to monitor for any further bleeding.  Denies pain. Wife at bedside.    1411 up to bathroom to void.  Reports no diff voiding.  Groin site remains unchanged after activity x2 checks.  Dressing CDI.    Given take home RX x2.  discharge instructions given to pt and spouse.  States understanding.

## 2019-03-11 NOTE — IP AVS SNAPSHOT
[place] [address] [place] [address]  Phone:  [phone]                                    After Visit Summary   3/11/2019    Austin Garza    MRN: [id number]           After Visit Summary Signature Page    I have received my discharge instructions, and my questions have been answered. I have discussed any challenges I see with this plan with the nurse or doctor.    ..........................................................................................................................................  Patient/Patient Representative Signature      ..........................................................................................................................................  Patient Representative Print Name and Relationship to Patient    ..................................................               ................................................  Date                                   Time    ..........................................................................................................................................  Reviewed by Signature/Title    ...................................................              ..............................................  Date                                               Time          22EPIC Rev 08/18

## 2019-03-11 NOTE — PROGRESS NOTES
1145 Rt groin dsg saturated.Manual pressure held for 10 min with thrombix. Dsg changed. Will monitor. lenny lunch.

## 2019-03-11 NOTE — CONSULTS
[facility]    Vascular Medicine Consultation     Attestation: I have examined the patient independently of Thea Samaniego PA-C and agree with the examination and plan as delineated below.    Roverto Siddiqui MD      Date of Admission:  3/11/2019  Date of Consult (When I saw the patient): 03/11/19    Assessment & Plan   1.  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 The middle colic artery was unable to be accessed. Noted, however, was an SMA dissection which he likely had developed prior to the angiogram as there was retrograde flow noted in the the angiogram.      Plan: He should be discharged on Plavix for this SMA dissection. His prior to admission aspirin 325 mg daily should then be decreased to 81 mg daily. He should follow up with Interventional Radiology in one month with a CTA just prior to the appointment.      2.  Hyperlipidemia with LDL goal of less than 70.       Assessment: LDL 52 - at goal  Plan: Continue Lipitor 20 mg daily.      3.  Type 2 diabetes with hemoglobin A1c goal of less than 7%.       Assessment: A1c 5.9% - at goal.  Takes glimepiride and metformin.  He just received contrast dye with his angiogram.    Plan: Hold metformin for 48 hours, then resume. Continue Glimepiride.      4.  Chronic lymphocytic leukemia.       Assessment: WBC 76,700.    Plan: Long-term management of this will be determined by Hematology/Oncology.        Reason for Consult   Reason for consult: Asked by Dr. Gauthier to evaluate and provide recommendations regarding anti-platelet therapy in this 78 year old male presenting for an attempt at embolization of a Type II endoleak of prior EVAR.      Primary Care Physician   Sandi Nguyen      History of Present Illness   Austin Garza is a 78 year old male who on  11/29/2017 had a CT of the chest, abdomen and pelvis and was incidentally discovered to have a 76 mm unruptured, asymptomatic AAA.  He was evaluated for suitability for endovascular repair.  He was felt to be an anatomical candidate proceeded with this on 12/4/17 by Dr. Cazares.  On follow-up imaging, a type II endoleak was noted. This has been followed. However, his aneurysm sac size has increased to 7.6 cm from 7.3 cm just 6 months prior. It was recommended that he come in for an angiogram with possible embolization of the endoleak. He presented today for this. He otherwise feels well.            Past Medical History   Past Medical History:   Diagnosis Date     Diaphragmatic hernia without mention of obstruction or gangrene      Diverticulitis of colon (without mention of hemorrhage)(562.11)      Esophageal reflux      Irritable bowel syndrome      Macular degeneration (senile) of retina, unspecified      Squamous Cell Carcinoma, Back 1988     Type II or unspecified type diabetes mellitus without mention of complication, not stated as uncontrolled      Unspecified essential hypertension        Past Surgical History   Past Surgical History:   Procedure Laterality Date     APPENDECTOMY  1966     BONE MARROW BIOPSY, BONE SPECIMEN, NEEDLE/TROCAR N/A 11/21/2017    Procedure: BIOPSY BONE MARROW;  BONE MARROW BIOPSY;  Surgeon: Shady Garcia MD;  Location:  GI     COLONOSCOPY       ENDOVASCULAR REPAIR ANEURYSM ABDOMINAL AORTA N/A 12/4/2017    Procedure: ENDOVASCULAR REPAIR ANEURYSM ABDOMINAL AORTA;  ENDOVASCULAR REPAIR ANEURYSM ABDOMINAL AORTA;  Surgeon: Milton Cazares MD;  Location:  OR     SURGICAL HISTORY OF -   1985-90    Squamous cell skin cancer resection - back     SURGICAL HISTORY OF -   1998    skin tags resection     SURGICAL HISTORY OF -   2/2001    stress thall. 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 Taking?   Blood Glucose Monitoring Suppl (ONE TOUCH ULTRA 2) W/DEVICE KIT Unknown at Unknown time Self No No   Sig: Use to test blood sugars 2-3 times daily as directed. Brand per insurance formulary     Multiple Vitamins-Minerals (CENTRUM SILVER) per tablet 3/10/2019 at Unknown time Self Yes Yes   Sig: Take 1 tablet by mouth daily   aspirin 325 MG tablet 3/6/2019  No No   Sig: Take 1 tablet (325 mg) by mouth every evening   atorvastatin (LIPITOR) 20 MG tablet 3/10/2019 at Unknown time  No Yes   Sig: Take 1 tablet (20 mg) by mouth At Bedtime   blood glucose (ONETOUCH ULTRA) test strip Unknown at Unknown time  No No   Sig: Test 2-3 times daily as directed, brand per insurance formulary.   fluticasone (FLONASE) 50 MCG/ACT nasal spray More than a month at Unknown time  No No   Sig: Spray 2 sprays into both nostrils 2 times daily   glimepiride (AMARYL) 1 MG tablet 3/10/2019 at Unknown time  No Yes   Sig: Take 1 tablet (1 mg) by mouth every morning (before breakfast)   losartan-hydrochlorothiazide (HYZAAR) 50-12.5 MG tablet 3/10/2019 at Unknown time  No Yes   Sig: Take 1 tablet by mouth daily   metFORMIN (GLUCOPHAGE) 1000 MG tablet 3/10/2019 at Unknown time  No Yes   Sig: Take 1 tablet (1,000 mg) by mouth 2 times daily (with meals)      Facility-Administered Medications: None     Allergies   Allergies   Allergen Reactions     Ace Inhibitors      cough       Social History   Austin Garza  reports that he quit smoking about 44 years ago. He has a 10.00 pack-year smoking history. He has quit using smokeless tobacco. He reports that he drinks about 6.0 - 8.4 oz of alcohol per week. He reports that he does not use drugs.    Family History   Family History   Problem Relation Age of Onset     Cerebrovascular Disease Father         x 2     Hypertension Mother      C.A.D. Mother       Cancer Mother         skin     Eye Disorder Mother         glaucoma     Prostate Cancer No family hx of      Cancer - colorectal No family hx of      Glaucoma No family hx of      Macular Degeneration No family hx of        Review of Systems   The 10 point Review of Systems is negative other than noted in the HPI or here.     Physical Exam   Temp: 97.7  F (36.5  C) Temp src: Oral BP: 152/90 Pulse: 64 Heart Rate: 75 Resp: 16 SpO2: 97 % O2 Device: None (Room air)    Vital Signs with Ranges  Temp:  [97.7  F (36.5  C)] 97.7  F (36.5  C)  Pulse:  [64-84] 64  Heart Rate:  [71-86] 75  Resp:  [8-20] 16  BP: (129-157)/(72-92) 152/90  SpO2:  [91 %-99 %] 97 %  212 lbs 3.2 oz    Constitutional: awake, alert, cooperative, no apparent distress, and appears stated age  Eyes: Lids and lashes normal, pupils equal, round and reactive to light, extra ocular muscles intact, sclera clear, conjunctiva normal  ENT: normocepalic, without obvious abnormality, oropharynx pink and moist  Hematologic / Lymphatic: no lymphadenopathy  Respiratory: No increased work of breathing, good air exchange, clear to auscultation bilaterally, no crackles or wheezing  Cardiovascular: regular rate and rhythm, normal S1 and S2 and no murmur noted  GI: Normal bowel sounds, soft, non-distended, non-tender  Skin: no redness, warmth, or swelling, no rashes. Angio site c/d/i.  Musculoskeletal: There is no redness, warmth, or swelling of the joints.  Full range of motion noted.  Motor strength is 5 out of 5 all extremities bilaterally.  Tone is normal.  Neurologic: Awake, alert, oriented to name, place and time.  Cranial nerves II-XII are grossly intact.  Motor is 5 out of 5 bilaterally.    Neuropsychiatric:  Normal affect, memory, insight.      Data   Most Recent 3 CBC's:  Recent Labs   Lab Test 03/11/19  0700 03/06/19  0949 12/19/18  0828   WBC 76.7* 74.1* 65.3*   HGB 9.3* 9.0* 9.7*   * 111* 107*   * 106* 113*     Most Recent 3 BMP's:  Recent  Labs   Lab Test 03/11/19  0700 03/06/19  0949 09/11/18  0858 08/22/18  0839   NA  --  142 142 137   POTASSIUM  --  4.2 4.4 5.1   CHLORIDE  --  108 106 106   CO2  --  27 27 26   BUN  --  25 25 24   CR 1.38* 1.43* 1.34* 1.30*   ANIONGAP  --  7 9 5   MAGDALENO  --  8.9 8.9 8.8   GLC  --  156* 163* 139*     Most Recent 3 INR's:  Recent Labs   Lab Test 03/11/19  0700   INR 1.03     Most Recent Cholesterol Panel:  Recent Labs   Lab Test 03/11/19  0700   CHOL 104   LDL 52   HDL 33*   TRIG 97     Most Recent Hemoglobin A1c:  Recent Labs   Lab Test 03/11/19  0700   A1C 5.9*

## 2019-03-12 ENCOUNTER — TELEPHONE (OUTPATIENT)
Dept: OTHER | Facility: CLINIC | Age: 79
End: 2019-03-12

## 2019-03-12 NOTE — TELEPHONE ENCOUNTER
Spoke with patient doing fine following angiogram.  Puncture site is without redness or swelling,  Replaced dressing with bandaid today.  No abdominal or back pain.  Mild loose stools today.  Eating and drinking without issues.  Lisette Hinojosa RN  IR nurse clinician  [phone]

## 2019-03-13 DIAGNOSIS — C91.10 CLL (CHRONIC LYMPHOCYTIC LEUKEMIA) (H): ICD-10-CM

## 2019-03-13 LAB
ANISOCYTOSIS BLD QL SMEAR: SLIGHT
BASOPHILS # BLD AUTO: 0 10E9/L (ref 0–0.2)
BASOPHILS NFR BLD AUTO: 0 %
DACRYOCYTES BLD QL SMEAR: SLIGHT
DIFFERENTIAL METHOD BLD: ABNORMAL
EOSINOPHIL # BLD AUTO: 0 10E9/L (ref 0–0.7)
EOSINOPHIL NFR BLD AUTO: 0 %
ERYTHROCYTE [DISTWIDTH] IN BLOOD BY AUTOMATED COUNT: 17.2 % (ref 10–15)
HCT VFR BLD AUTO: 29.8 % (ref 40–53)
HGB BLD-MCNC: 9.3 G/DL (ref 13.3–17.7)
LYMPHOCYTES # BLD AUTO: 70.4 10E9/L (ref 0.8–5.3)
LYMPHOCYTES NFR BLD AUTO: 96 %
MCH RBC QN AUTO: 34.8 PG (ref 26.5–33)
MCHC RBC AUTO-ENTMCNC: 31.2 G/DL (ref 31.5–36.5)
MCV RBC AUTO: 112 FL (ref 78–100)
MONOCYTES # BLD AUTO: 0.7 10E9/L (ref 0–1.3)
MONOCYTES NFR BLD AUTO: 1 %
NEUTROPHILS # BLD AUTO: 2.2 10E9/L (ref 1.6–8.3)
NEUTROPHILS NFR BLD AUTO: 3 %
OVALOCYTES BLD QL SMEAR: SLIGHT
PLATELET # BLD AUTO: 112 10E9/L (ref 150–450)
PLATELET # BLD EST: ABNORMAL 10*3/UL
RBC # BLD AUTO: 2.67 10E12/L (ref 4.4–5.9)
VARIANT LYMPHS BLD QL SMEAR: PRESENT
WBC # BLD AUTO: 73.3 10E9/L (ref 4–11)

## 2019-03-13 PROCEDURE — 85025 COMPLETE CBC W/AUTO DIFF WBC: CPT | Performed by: INTERNAL MEDICINE

## 2019-03-13 PROCEDURE — 36415 COLL VENOUS BLD VENIPUNCTURE: CPT | Performed by: INTERNAL MEDICINE

## 2019-03-14 ENCOUNTER — ONCOLOGY VISIT (OUTPATIENT)
Dept: ONCOLOGY | Facility: CLINIC | Age: 79
End: 2019-03-14
Attending: INTERNAL MEDICINE
Payer: COMMERCIAL

## 2019-03-14 VITALS
RESPIRATION RATE: 16 BRPM | BODY MASS INDEX: 31.52 KG/M2 | TEMPERATURE: 97.8 F | HEART RATE: 95 BPM | DIASTOLIC BLOOD PRESSURE: 81 MMHG | SYSTOLIC BLOOD PRESSURE: 138 MMHG | OXYGEN SATURATION: 98 % | HEIGHT: 69 IN | WEIGHT: 212.8 LBS

## 2019-03-14 DIAGNOSIS — C91.10 CLL (CHRONIC LYMPHOCYTIC LEUKEMIA) (H): Primary | ICD-10-CM

## 2019-03-14 PROCEDURE — 99214 OFFICE O/P EST MOD 30 MIN: CPT | Performed by: INTERNAL MEDICINE

## 2019-03-14 PROCEDURE — G0463 HOSPITAL OUTPT CLINIC VISIT: HCPCS

## 2019-03-14 ASSESSMENT — MIFFLIN-ST. JEOR: SCORE: 1685.63

## 2019-03-14 ASSESSMENT — PAIN SCALES - GENERAL: PAINLEVEL: NO PAIN (0)

## 2019-03-14 NOTE — LETTER
3/14/2019         RE: Austin Garza  [address] [place] [address]        Dear Colleague,    Thank you for referring your patient, Austin Garza, to the [facility]. Please see a copy of my visit note below.    [facility]    Hematology/Oncology Established Patient Note      Today's Date: 3/14/19    Reason for Follow-up: CLL      HISTORY OF PRESENT ILLNESS: Austin Garza is a 78 year old male with PMHx of DMII, HTN who presented with leukocytosis.  In November 2017, he was found to have elevated WBC of 61.7K, hemoglobin of 10.4, and platelet of 115K.  There were no other CBC results available between 2010 and 2017.  Prior to 2010, he had normal CBC, with normal to mild anemia, and mild thrombocytopenia.   He was asymptomatic.    He underwent bone marrow biopsy on 11/21/17, which was consistent with chronic lymphocytic leukemia/small lymphocytic lymphoma, with 13% ringed sideroblasts identified.  The presence of increased numbers of ringed sideroblasts raises the possibility of an associated myelodysplastic syndrome.  Dysplastic changes are not identified though.  Many cases exhibiting ringed sideroblasts are metabolic origin, without significant risk of progression to acute leukemia, and these typically do not show dysplastic morphology as is the case with this specimen.  15% ringed sideroblasts are required for a diagnosis for RARS, which this specimen does not meet.  Flow cytometry is also consistent with CLL/SLL.  Cytogenetics show two (10%) of the metaphase cells comprise a clone characterized by a deletion within the proximal long arm of a chromosome 13 as the sole karyotypic abnormality.  Three (15%) metaphases had loss of the Y chromosome as the sole abnormality.    CT scan on 11/29/17 shows mildly enlarged left axillary lymph node and periaortic lymph nodes in the retroperitoneum of the abdomen.  Spleen is also enlarged.    On his  staging CT scan for CLL, he was incidentally found to have a large infrarenal abdominal aortic aneurysm, measuring 7.6 cm.  He was seen by Dr. Thomas, and he underwent EVAR on 12/4/17.       INTERIM HISTORY: Austin is here for follow-up today.  He was recently found to have endoleak of his EVAR in 2017.  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  He was sent home on Plavix, and aspirin dose was decreased.        REVIEW OF SYSTEMS:   14 point ROS was reviewed and is negative other than as noted above in HPI.       HOME MEDICATIONS:  Current Outpatient Medications   Medication Sig Dispense Refill     aspirin (ASA) 81 MG tablet Take 1 tablet (81 mg) by mouth every evening 90 tablet 1     atorvastatin (LIPITOR) 20 MG tablet Take 1 tablet (20 mg) by mouth At Bedtime 90 tablet 2     blood glucose (ONETOUCH ULTRA) test strip Test 2-3 times daily as directed, brand per insurance formulary. 100 each 1     Blood Glucose Monitoring Suppl (ONE TOUCH ULTRA 2) W/DEVICE KIT Use to test blood sugars 2-3 times daily as directed. Brand per insurance formulary   1 kit 0     clopidogrel (PLAVIX) 75 MG tablet Take 1 tablet (75 mg) by mouth daily 90 tablet 1     fluticasone (FLONASE) 50 MCG/ACT nasal spray Spray 2 sprays into both nostrils 2 times daily 1 Bottle 9     glimepiride (AMARYL) 1 MG tablet Take 1 tablet (1 mg) by mouth every morning (before breakfast) 90 tablet 0     losartan-hydrochlorothiazide (HYZAAR) 50-12.5 MG tablet Take 1 tablet by mouth daily 90 tablet 2     metFORMIN (GLUCOPHAGE) 1000 MG tablet Take 1 tablet (1,000 mg) by mouth 2 times daily (with meals) 180 tablet 0     Multiple Vitamins-Minerals (CENTRUM SILVER) per tablet Take 1 tablet by mouth daily           ALLERGIES:  Allergies   Allergen Reactions     Ace Inhibitors      cough         PAST MEDICAL HISTORY:  Past Medical History:   Diagnosis Date     Diaphragmatic hernia without mention of obstruction or gangrene      Diverticulitis of colon  (without mention of hemorrhage)(562.11)      Esophageal reflux      Irritable bowel syndrome      Macular degeneration (senile) of retina, unspecified      Squamous Cell Carcinoma, Back      Type II or unspecified type diabetes mellitus without mention of complication, not stated as uncontrolled      Unspecified essential hypertension          PAST SURGICAL HISTORY:  Past Surgical History:   Procedure Laterality Date     APPENDECTOMY       BONE MARROW BIOPSY, BONE SPECIMEN, NEEDLE/TROCAR N/A 2017    Procedure: BIOPSY BONE MARROW;  BONE MARROW BIOPSY;  Surgeon: Shady Garcia MD;  Location:  GI     COLONOSCOPY       ENDOVASCULAR REPAIR ANEURYSM ABDOMINAL AORTA N/A 2017    Procedure: ENDOVASCULAR REPAIR ANEURYSM ABDOMINAL AORTA;  ENDOVASCULAR REPAIR ANEURYSM ABDOMINAL AORTA;  Surgeon: Milton Cazares MD;  Location: [facility] OR     IR ABDOMINAL AORTOGRAM  3/11/2019     SURGICAL HISTORY OF -       Squamous cell skin cancer resection - back     SURGICAL HISTORY OF -       skin tags resection     SURGICAL HISTORY OF -   2001    stress thall. normal     SURGICAL HISTORY OF -   2002    colonoscopy     SURGICAL HISTORY OF -       Fistulotomy with marsupialization for repair of fisture in ano         SOCIAL HISTORY:  Social History     Socioeconomic History     Marital status:      Spouse name: librado     Number of children: 0     Years of education: 17     Highest education level: Not on file   Occupational History     Occupation: retired   Social Needs     Financial resource strain: Not on file     Food insecurity:     Worry: Not on file     Inability: Not on file     Transportation needs:     Medical: Not on file     Non-medical: Not on file   Tobacco Use     Smoking status: Former Smoker     Packs/day: 0.50     Years: 20.00     Pack years: 10.00     Last attempt to quit: 1975     Years since quittin.2     Smokeless tobacco: Former User   Substance and Sexual  "Activity     Alcohol use: Yes     Alcohol/week: 6.0 - 8.4 oz     Types: 10 - 14 Standard drinks or equivalent per week     Comment: 2 drinks a week     Drug use: No     Sexual activity: No   Lifestyle     Physical activity:     Days per week: Not on file     Minutes per session: Not on file     Stress: Not on file   Relationships     Social connections:     Talks on phone: Not on file     Gets together: Not on file     Attends Mandaen service: Not on file     Active member of club or organization: Not on file     Attends meetings of clubs or organizations: Not on file     Relationship status: Not on file     Intimate partner violence:     Fear of current or ex partner: Not on file     Emotionally abused: Not on file     Physically abused: Not on file     Forced sexual activity: Not on file   Other Topics Concern     Parent/sibling w/ CABG, MI or angioplasty before 65F 55M? Not Asked   Social History Narrative     Not on file   He quit smoking in .  He drinks 1-3 glasses of wine a night with dinner.  He is retired, and used to work as a .  He lives with his wife in [place].  His cousin had lung cancer and  around age 69.  Otherwise, he denies family history of cancer.        FAMILY HISTORY:  Family History   Problem Relation Age of Onset     Cerebrovascular Disease Father         x 2     Hypertension Mother      C.A.D. Mother      Cancer Mother         skin     Eye Disorder Mother         glaucoma     Prostate Cancer No family hx of      Cancer - colorectal No family hx of      Glaucoma No family hx of      Macular Degeneration No family hx of          PHYSICAL EXAM:  Vital signs:  /81   Pulse 95   Temp 97.8  F (36.6  C) (Oral)   Resp 16   Ht 1.753 m (5' 9\")   Wt 96.5 kg (212 lb 12.8 oz)   SpO2 98%   BMI 31.43 kg/m      GENERAL/CONSTITUTIONAL: No acute distress.  EYES: No scleral icterus.  MUSCULOSKELETAL: Warm and well-perfused.  NEUROLOGIC: Alert, oriented, answers questions " appropriately.  INTEGUMENTARY: No jaundice.      LABS:  CBC RESULTS:   Recent Labs   Lab Test 03/13/19  0827   WBC 73.3*   RBC 2.67*   HGB 9.3*   HCT 29.8*   *   MCH 34.8*   MCHC 31.2*   RDW 17.2*   *           PATHOLOGY:  Bone marrow biopsy 11/21/17:  FINAL DIAGNOSIS:   Peripheral blood demonstrating macrocytic anemia with thrombocytopenia     Bone marrow trephine biopsy and aspirate demonstrating hypercellular   bone marrow involved by chronic lymphocytic leukemia/small lymphocytic   lymphoma, 13% ringed sideroblasts identified (see comment)     COMMENT:   Immunophenotyping studies demonstrate a kappa monotypic CD5 positive   B-cell population most compatible with chronic lymphocytic leukemia.  An   immunoperoxidase stain for cyclin D1 is pending.  Dysplastic changes are   not identified within the erythroid series.  However, the presence of   increased numbers of ringed sideroblasts raises the possibility of an   associated myelodysplastic syndrome, particularly in light of the noted   increased mean red cell volume and associated thrombocytopenia.  The   thrombocytopenia may also reflect reduced numbers of megakaryocytes   given the involvement of marrow by chronic lymphocytic leukemia.  Many   cases exhibiting ringed sideroblasts are metabolic in origin, without   significant risk of progression to acute leukemia, and these typically   do not show dysplastic morphology as is the case with the current   specimen.  Moreover, 15% ringed sideroblasts are required for a   diagnosis of  RARS. Cytogenetic studies are pending.     Flow  INTERPRETATION:   Bone Marrow, Left:        CD5 positive Kappa monotypic B cells (82%)     COMMENT:   The clonal B-cell population present in this specimen has a similar   immunophenotype as reported previously in the blood from this patient   ([id number]).  The immunophenotypic findings are suggestive of marrow   involvement by small lymphocytic lymphoma/chronic  lymphocytic leukemia   (SLL/CLL). Large cells may not survive specimen processing.  There may   be peripheral blood contamination. Final interpretation requires   correlation with results of other ancillary studies, morphologic and   clinical features.     ISCN:   46,XY,del(13)(q12q14)[2]/45,X,-Y[3]/46,XY[15].nuc   alton(ATMx2,TP53x1)[4/200],([id number])[21/200]     INTERPRETATION:   Two (10%) of the metaphase cells analyzed comprised a clone   characterized by a deletion within the proximal long arm of a chromosome   13 as the sole karyotypic abnormality. Additionally, three (15%)   metaphases had loss of the Y chromosome as the sole abnormality.     These findings confirm and expand those of the previously reported FISH   analysis ([id number]) that showed 10.5% of interphase cells to have a   signal pattern indicative of loss of the [id number] locus.     Loss of the Y chromosome, as seen in this case, has been reported both   as an acquired clonal abnormality associated with hematologic (primarily   myeloid) disorders and also as a clinically insignificant finding   associated with the normal aging process in adult males. When presenting   as a clonal abnormality, loss of Y is typically seen in addition to   other cytogenetic abnormalities. This patient's age and the absence of   other chromosomal abnormalities suggest that the loss of Y represents an   age-related finding in this case.         ASSESSMENT/PLAN:  Austin Garza is a 78 year old male with:    1) CLL/SLL: BLACKWOOD stage III, with lymphocytosis, lymphadenopathy, splenomegaly, and anemia.  He has mild thrombocytopenia as well, but is above 100K.  He presented with leukocytosis with WBC of 61.7K, hemoglobin of 10.4, and platelet count of 115K on diagnosis.  Bone marrow biopsy and flow cytometry confirmed CLL/SLL.  CT scan shows mildly enlarged left axillary lymph node and periaortic lymph nodes in the retroperitoneum of the abdomen, with enlarged spleen.       CBC from yesterday reviewed with Austin.  WBC is 73.3K, around where his baseline levels have been.  Platelet count and hemoglobin remain in his baseline range.    He has symptoms of fatigue and sweats around his neck at night, as well as fatigue.  Elevated lymphocyte count is not necessarily an indication to start treatment.  However, I discussed that if these symptoms are thought to be related to the CLL and if his lymphocyte count continues to rise rapidly or if his cytopenias worsen, this may warrant starting treatment.  He says that he would be okay with that if it is needed.     Since WBC is fairly stable at his baseline, hemoglobin and platelet count are also stable, and his symptoms have not changed, will continue to monitor closely for now.    -RTC in 3 months with repeat CBC    2) Abdominal aortic aneurysm: measures up to 7.6 cm on CT scan, incidentally found.  He underwent EVAR on 12/4/17.  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  He is on Plavix now.  He is seeing IR in about a month for percutaneous embolization of endoleak.  -follow-up with vascular surgery and IR    3) Diabetes, hypertension:  -following with PCP      I spent a total of 25 minutes with the patient, with over >50% of the time in counseling and/or coordination of care.      Breana Perry MD  Hematology/Oncology  [facility]      Again, thank you for allowing me to participate in the care of your patient.        Sincerely,        Breana Perry MD

## 2019-03-14 NOTE — PROGRESS NOTES
[facility]    Hematology/Oncology Established Patient Note      Today's Date: 3/14/19    Reason for Follow-up: CLL      HISTORY OF PRESENT ILLNESS: Austin Garza is a 78 year old male with PMHx of DMII, HTN who presented with leukocytosis.  In November 2017, he was found to have elevated WBC of 61.7K, hemoglobin of 10.4, and platelet of 115K.  There were no other CBC results available between 2010 and 2017.  Prior to 2010, he had normal CBC, with normal to mild anemia, and mild thrombocytopenia.   He was asymptomatic.    He underwent bone marrow biopsy on 11/21/17, which was consistent with chronic lymphocytic leukemia/small lymphocytic lymphoma, with 13% ringed sideroblasts identified.  The presence of increased numbers of ringed sideroblasts raises the possibility of an associated myelodysplastic syndrome.  Dysplastic changes are not identified though.  Many cases exhibiting ringed sideroblasts are metabolic origin, without significant risk of progression to acute leukemia, and these typically do not show dysplastic morphology as is the case with this specimen.  15% ringed sideroblasts are required for a diagnosis for RARS, which this specimen does not meet.  Flow cytometry is also consistent with CLL/SLL.  Cytogenetics show two (10%) of the metaphase cells comprise a clone characterized by a deletion within the proximal long arm of a chromosome 13 as the sole karyotypic abnormality.  Three (15%) metaphases had loss of the Y chromosome as the sole abnormality.    CT scan on 11/29/17 shows mildly enlarged left axillary lymph node and periaortic lymph nodes in the retroperitoneum of the abdomen.  Spleen is also enlarged.    On his staging CT scan for CLL, he was incidentally found to have a large infrarenal abdominal aortic aneurysm, measuring 7.6 cm.  He was seen by Dr. Thomas, and he underwent EVAR on 12/4/17.       INTERIM HISTORY: Austin is here for follow-up today.  He was recently  found to have endoleak of his EVAR in 2017.  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  He was sent home on Plavix, and aspirin dose was decreased.        REVIEW OF SYSTEMS:   14 point ROS was reviewed and is negative other than as noted above in HPI.       HOME MEDICATIONS:  Current Outpatient Medications   Medication Sig Dispense Refill     aspirin (ASA) 81 MG tablet Take 1 tablet (81 mg) by mouth every evening 90 tablet 1     atorvastatin (LIPITOR) 20 MG tablet Take 1 tablet (20 mg) by mouth At Bedtime 90 tablet 2     blood glucose (ONETOUCH ULTRA) test strip Test 2-3 times daily as directed, brand per insurance formulary. 100 each 1     Blood Glucose Monitoring Suppl (ONE TOUCH ULTRA 2) W/DEVICE KIT Use to test blood sugars 2-3 times daily as directed. Brand per insurance formulary   1 kit 0     clopidogrel (PLAVIX) 75 MG tablet Take 1 tablet (75 mg) by mouth daily 90 tablet 1     fluticasone (FLONASE) 50 MCG/ACT nasal spray Spray 2 sprays into both nostrils 2 times daily 1 Bottle 9     glimepiride (AMARYL) 1 MG tablet Take 1 tablet (1 mg) by mouth every morning (before breakfast) 90 tablet 0     losartan-hydrochlorothiazide (HYZAAR) 50-12.5 MG tablet Take 1 tablet by mouth daily 90 tablet 2     metFORMIN (GLUCOPHAGE) 1000 MG tablet Take 1 tablet (1,000 mg) by mouth 2 times daily (with meals) 180 tablet 0     Multiple Vitamins-Minerals (CENTRUM SILVER) per tablet Take 1 tablet by mouth daily           ALLERGIES:  Allergies   Allergen Reactions     Ace Inhibitors      cough         PAST MEDICAL HISTORY:  Past Medical History:   Diagnosis Date     Diaphragmatic hernia without mention of obstruction or gangrene      Diverticulitis of colon (without mention of hemorrhage)(562.11)      Esophageal reflux      Irritable bowel syndrome      Macular degeneration (senile) of retina, unspecified      Squamous Cell Carcinoma, Back 1988     Type II or unspecified type diabetes mellitus without mention of  complication, not stated as uncontrolled      Unspecified essential hypertension          PAST SURGICAL HISTORY:  Past Surgical History:   Procedure Laterality Date     APPENDECTOMY  1966     BONE MARROW BIOPSY, BONE SPECIMEN, NEEDLE/TROCAR N/A 2017    Procedure: BIOPSY BONE MARROW;  BONE MARROW BIOPSY;  Surgeon: Shady Garcia MD;  Location:  GI     COLONOSCOPY       ENDOVASCULAR REPAIR ANEURYSM ABDOMINAL AORTA N/A 2017    Procedure: ENDOVASCULAR REPAIR ANEURYSM ABDOMINAL AORTA;  ENDOVASCULAR REPAIR ANEURYSM ABDOMINAL AORTA;  Surgeon: Milton Cazares MD;  Location:  OR     IR ABDOMINAL AORTOGRAM  3/11/2019     SURGICAL HISTORY OF -   -    Squamous cell skin cancer resection - back     SURGICAL HISTORY OF -       skin tags resection     SURGICAL HISTORY OF -   2001    stress thall. normal     SURGICAL HISTORY OF -   2002    colonoscopy     SURGICAL HISTORY OF -       Fistulotomy with marsupialization for repair of fisture in ano         SOCIAL HISTORY:  Social History     Socioeconomic History     Marital status:      Spouse name: librado     Number of children: 0     Years of education: 17     Highest education level: Not on file   Occupational History     Occupation: retired   Social Needs     Financial resource strain: Not on file     Food insecurity:     Worry: Not on file     Inability: Not on file     Transportation needs:     Medical: Not on file     Non-medical: Not on file   Tobacco Use     Smoking status: Former Smoker     Packs/day: 0.50     Years: 20.00     Pack years: 10.00     Last attempt to quit: 1975     Years since quittin.2     Smokeless tobacco: Former User   Substance and Sexual Activity     Alcohol use: Yes     Alcohol/week: 6.0 - 8.4 oz     Types: 10 - 14 Standard drinks or equivalent per week     Comment: 2 drinks a week     Drug use: No     Sexual activity: No   Lifestyle     Physical activity:     Days per week: Not on file      "Minutes per session: Not on file     Stress: Not on file   Relationships     Social connections:     Talks on phone: Not on file     Gets together: Not on file     Attends Scientologist service: Not on file     Active member of club or organization: Not on file     Attends meetings of clubs or organizations: Not on file     Relationship status: Not on file     Intimate partner violence:     Fear of current or ex partner: Not on file     Emotionally abused: Not on file     Physically abused: Not on file     Forced sexual activity: Not on file   Other Topics Concern     Parent/sibling w/ CABG, MI or angioplasty before 65F 55M? Not Asked   Social History Narrative     Not on file   He quit smoking in .  He drinks 1-3 glasses of wine a night with dinner.  He is retired, and used to work as a .  He lives with his wife in [place].  His cousin had lung cancer and  around age 69.  Otherwise, he denies family history of cancer.        FAMILY HISTORY:  Family History   Problem Relation Age of Onset     Cerebrovascular Disease Father         x 2     Hypertension Mother      C.A.D. Mother      Cancer Mother         skin     Eye Disorder Mother         glaucoma     Prostate Cancer No family hx of      Cancer - colorectal No family hx of      Glaucoma No family hx of      Macular Degeneration No family hx of          PHYSICAL EXAM:  Vital signs:  /81   Pulse 95   Temp 97.8  F (36.6  C) (Oral)   Resp 16   Ht 1.753 m (5' 9\")   Wt 96.5 kg (212 lb 12.8 oz)   SpO2 98%   BMI 31.43 kg/m     GENERAL/CONSTITUTIONAL: No acute distress.  EYES: No scleral icterus.  MUSCULOSKELETAL: Warm and well-perfused.  NEUROLOGIC: Alert, oriented, answers questions appropriately.  INTEGUMENTARY: No jaundice.      LABS:  CBC RESULTS:   Recent Labs   Lab Test 19  0827   WBC 73.3*   RBC 2.67*   HGB 9.3*   HCT 29.8*   *   MCH 34.8*   MCHC 31.2*   RDW 17.2*   *           PATHOLOGY:  Bone marrow biopsy 17:  FINAL " DIAGNOSIS:   Peripheral blood demonstrating macrocytic anemia with thrombocytopenia     Bone marrow trephine biopsy and aspirate demonstrating hypercellular   bone marrow involved by chronic lymphocytic leukemia/small lymphocytic   lymphoma, 13% ringed sideroblasts identified (see comment)     COMMENT:   Immunophenotyping studies demonstrate a kappa monotypic CD5 positive   B-cell population most compatible with chronic lymphocytic leukemia.  An   immunoperoxidase stain for cyclin D1 is pending.  Dysplastic changes are   not identified within the erythroid series.  However, the presence of   increased numbers of ringed sideroblasts raises the possibility of an   associated myelodysplastic syndrome, particularly in light of the noted   increased mean red cell volume and associated thrombocytopenia.  The   thrombocytopenia may also reflect reduced numbers of megakaryocytes   given the involvement of marrow by chronic lymphocytic leukemia.  Many   cases exhibiting ringed sideroblasts are metabolic in origin, without   significant risk of progression to acute leukemia, and these typically   do not show dysplastic morphology as is the case with the current   specimen.  Moreover, 15% ringed sideroblasts are required for a   diagnosis of  RARS. Cytogenetic studies are pending.     Flow  INTERPRETATION:   Bone Marrow, Left:        CD5 positive Kappa monotypic B cells (82%)     COMMENT:   The clonal B-cell population present in this specimen has a similar   immunophenotype as reported previously in the blood from this patient   ([id number]).  The immunophenotypic findings are suggestive of marrow   involvement by small lymphocytic lymphoma/chronic lymphocytic leukemia   (SLL/CLL). Large cells may not survive specimen processing.  There may   be peripheral blood contamination. Final interpretation requires   correlation with results of other ancillary studies, morphologic and   clinical features.     ISCN:    46,XY,del(13)(q12q14)[2]/45,X,-Y[3]/46,XY[15].nuc   alton(ATMx2,TP53x1)[4/200],(B38G258k1,VMFE8f7)[21/200]     INTERPRETATION:   Two (10%) of the metaphase cells analyzed comprised a clone   characterized by a deletion within the proximal long arm of a chromosome   13 as the sole karyotypic abnormality. Additionally, three (15%)   metaphases had loss of the Y chromosome as the sole abnormality.     These findings confirm and expand those of the previously reported FISH   analysis ([id number]) that showed 10.5% of interphase cells to have a   signal pattern indicative of loss of the [id number] locus.     Loss of the Y chromosome, as seen in this case, has been reported both   as an acquired clonal abnormality associated with hematologic (primarily   myeloid) disorders and also as a clinically insignificant finding   associated with the normal aging process in adult males. When presenting   as a clonal abnormality, loss of Y is typically seen in addition to   other cytogenetic abnormalities. This patient's age and the absence of   other chromosomal abnormalities suggest that the loss of Y represents an   age-related finding in this case.         ASSESSMENT/PLAN:  Austin Garza is a 78 year old male with:    1) CLL/SLL: BLACKWOOD stage III, with lymphocytosis, lymphadenopathy, splenomegaly, and anemia.  He has mild thrombocytopenia as well, but is above 100K.  He presented with leukocytosis with WBC of 61.7K, hemoglobin of 10.4, and platelet count of 115K on diagnosis.  Bone marrow biopsy and flow cytometry confirmed CLL/SLL.  CT scan shows mildly enlarged left axillary lymph node and periaortic lymph nodes in the retroperitoneum of the abdomen, with enlarged spleen.      CBC from yesterday reviewed with Austin.  WBC is 73.3K, around where his baseline levels have been.  Platelet count and hemoglobin remain in his baseline range.    He has symptoms of fatigue and sweats around his neck at night, as well as fatigue.  Elevated  lymphocyte count is not necessarily an indication to start treatment.  However, I discussed that if these symptoms are thought to be related to the CLL and if his lymphocyte count continues to rise rapidly or if his cytopenias worsen, this may warrant starting treatment.  He says that he would be okay with that if it is needed.     Since WBC is fairly stable at his baseline, hemoglobin and platelet count are also stable, and his symptoms have not changed, will continue to monitor closely for now.    -RTC in 3 months with repeat CBC    2) Abdominal aortic aneurysm: measures up to 7.6 cm on CT scan, incidentally found.  He underwent EVAR on 12/4/17.  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  He is on Plavix now.  He is seeing IR in about a month for percutaneous embolization of endoleak.  -follow-up with vascular surgery and IR    3) Diabetes, hypertension:  -following with PCP      I spent a total of 25 minutes with the patient, with over >50% of the time in counseling and/or coordination of care.      Breana Perry MD  Hematology/Oncology  [facility]

## 2019-03-14 NOTE — NURSING NOTE
"Oncology Rooming Note    March 14, 2019 3:12 PM   Austin Garza is a 78 year old male who presents for:    Chief Complaint   Patient presents with     Oncology Clinic Visit     CLL (chronic lymphocytic leukemia)     Initial Vitals: /81   Pulse 95   Temp 97.8  F (36.6  C) (Oral)   Resp 16   Ht 1.753 m (5' 9\")   Wt 96.5 kg (212 lb 12.8 oz)   SpO2 98%   BMI 31.43 kg/m   Estimated body mass index is 31.43 kg/m  as calculated from the following:    Height as of this encounter: 1.753 m (5' 9\").    Weight as of this encounter: 96.5 kg (212 lb 12.8 oz). Body surface area is 2.17 meters squared.  No Pain (0) Comment: Data Unavailable   No LMP for male patient.  Allergies reviewed: Yes  Medications reviewed: Yes    Medications: Medication refills not needed today.  Pharmacy name entered into [employer]:    [facility] - [place], [place] - [address] [facility] [address]  [facility] [address] [place] [address] [address] [id number] - [place], [place] - [address] AT [facility]    Clinical concerns: Follow Up       Kimberley Luna CMA                "

## 2019-03-18 ENCOUNTER — TELEPHONE (OUTPATIENT)
Dept: OTHER | Facility: CLINIC | Age: 79
End: 2019-03-18

## 2019-03-18 NOTE — TELEPHONE ENCOUNTER
Per Dr. Cazares, pt to have f/u appt 2 weeks from  3-11-19 aortogram.     Routing to  to coordinate OV.     JAMESON OropezaN, RN

## 2019-03-19 NOTE — TELEPHONE ENCOUNTER
Austin is scheduled for 3/22/19 with Dr Debora thorpe also following up for the SMA dissection with Dr Gauthier on 4/18/19. Joellen Benites -  at [facility]

## 2019-03-19 NOTE — TELEPHONE ENCOUNTER
Left message on home number for patient to call back to schedule follow up appointment with Dr. Cazares.

## 2019-03-22 ENCOUNTER — OFFICE VISIT (OUTPATIENT)
Dept: OTHER | Facility: CLINIC | Age: 79
End: 2019-03-22
Attending: SURGERY
Payer: COMMERCIAL

## 2019-03-22 VITALS — HEART RATE: 97 BPM | DIASTOLIC BLOOD PRESSURE: 79 MMHG | SYSTOLIC BLOOD PRESSURE: 132 MMHG

## 2019-03-22 DIAGNOSIS — I71.40 ABDOMINAL AORTIC ANEURYSM (AAA) WITHOUT RUPTURE (H): Primary | ICD-10-CM

## 2019-03-22 PROCEDURE — G0463 HOSPITAL OUTPT CLINIC VISIT: HCPCS

## 2019-03-22 PROCEDURE — 99212 OFFICE O/P EST SF 10 MIN: CPT | Mod: ZP | Performed by: SURGERY

## 2019-03-22 NOTE — LETTER
[facility] [address]  [place] [address]  Phone: [phone]  Fax: [phone]      2019       Re: Austin CHEEMA Greg - 1940    Austin is here after his attempt at embolization of the YOHAN from a transfemoral approach. His aneurysm sac has been slowly growing with a persistent Type II leak from the YOHAN.  He has no pain and no concerns at this time and his right groin access site is healed nicely.  He will have repeat CTA  and then consideration for direct sac puncture to treat the endoleak.     Physical Examination:  /79 (BP Location: Right arm, Patient Position: Chair, Cuff Size: Adult Regular)   Pulse 97      Constitutional:  NAD, alert and appropriate, well developed male.  HEENT: PERRLA, mucous membranes moist.  Abd: soft, NT, no masses and non-pulsatile.  Ext:  Warm and well perfused.  Motor intact and sensory.  Right groin is soft, minimal bruising no hematoma.     Assessment: 78 yo male s/p EVAR 2017 with a delayed Type II endoleak.      Plan:  I plan to set up Austin for an abdominal aortogram with assessment of the location of the type II endoleak and possible embolization.  The transfemoral approach was unsuccessful and a small SMA dissection was noted.  He is following up with IR  with CTA and then discussion about possible direct sac puncture to treat his type II endoleak from the YOHAN.  He continues to be asymptomatic and I've asked him to call if he develops significant discomfort in his low back/abdomen.  If the endoleak is treated I will see him back in 6 months with a repeat CTA of the abdomen/pelvis.   I've spent 10 minutes of face-to-face time, > 50% spent counseling and coordinating care.      Milton Cazares MD  Vascular Surgery

## 2019-03-22 NOTE — PROGRESS NOTES
Vascular Surgery Clinic Note     Austin is here after his attempt at embolization of the YOHAN from a transfemoral approach. His aneurysm sac has been slowly growing with a persistent Type II leak from the YOHAN.  He has no pain and no concerns at this time and his right groin access site is healed nicely.  He will have repeat CTA April 18th and then consideration for direct sac puncture to treat the endoleak.     Physical Examination:  /79 (BP Location: Right arm, Patient Position: Chair, Cuff Size: Adult Regular)   Pulse 97      Constitutional:  NAD, alert and appropriate, well developed male.  HEENT: PERRLA, mucous membranes moist.  Abd: soft, NT, no masses and non-pulsatile.  Ext:  Warm and well perfused.  Motor intact and sensory.  Right groin is soft, minimal bruising no hematoma.     Assessment: 76 yo male s/p EVAR 12/2017 with a delayed Type II endoleak.      Plan:  I plan to set up Austin for an abdominal aortogram with assessment of the location of the type II endoleak and possible embolization.  The transfemoral approach was unsuccessful and a small SMA dissection was noted.  He is following up with IR April 18th with CTA and then discussion about possible direct sac puncture to treat his type II endoleak from the YOHAN.  He continues to be asymptomatic and I've asked him to call if he develops significant discomfort in his low back/abdomen.  If the endoleak is treated I will see him back in 6 months with a repeat CTA of the abdomen/pelvis.   I've spent 10 minutes of face-to-face time, > 50% spent counseling and coordinating care.      Milton Cazares MD  Vascular Surgery

## 2019-03-22 NOTE — NURSING NOTE
"Austin Garza is a 78 year old male who presents for:  Chief Complaint   Patient presents with     RECHECK     2 wk f/u to aortogram with Dr Gauthier on 3/11/19, seeluis carlos Gauthier on 4/18/19 for SMA dissectionf/u         Vitals:    Vitals:    03/22/19 1022   BP: 132/79   BP Location: Right arm   Patient Position: Chair   Cuff Size: Adult Regular   Pulse: 97       BMI:  Estimated body mass index is 31.43 kg/m  as calculated from the following:    Height as of 3/14/19: 5' 9\" (1.753 m).    Weight as of 3/14/19: 212 lb 12.8 oz (96.5 kg).    Pain Score:  Data Unavailable        Regine Mccollum MA    "

## 2019-04-18 ENCOUNTER — OFFICE VISIT (OUTPATIENT)
Dept: RADIOLOGY | Facility: CLINIC | Age: 79
End: 2019-04-18
Attending: RADIOLOGY
Payer: COMMERCIAL

## 2019-04-18 ENCOUNTER — HOSPITAL ENCOUNTER (OUTPATIENT)
Dept: CT IMAGING | Facility: CLINIC | Age: 79
Discharge: HOME | End: 2019-04-18
Attending: RADIOLOGY | Admitting: RADIOLOGY
Payer: COMMERCIAL

## 2019-04-18 DIAGNOSIS — I71.40 ABDOMINAL AORTIC ANEURYSM (AAA) WITHOUT RUPTURE (H): Primary | ICD-10-CM

## 2019-04-18 DIAGNOSIS — S35.229A: ICD-10-CM

## 2019-04-18 LAB
CREAT BLD-MCNC: 1.6 MG/DL (ref 0.66–1.25)
GFR SERPL CREATININE-BSD FRML MDRD: 42 ML/MIN/{1.73_M2}

## 2019-04-18 PROCEDURE — 82565 ASSAY OF CREATININE: CPT

## 2019-04-18 PROCEDURE — 25000128 H RX IP 250 OP 636: Performed by: RADIOLOGY

## 2019-04-18 PROCEDURE — 74175 CTA ABDOMEN W/CONTRAST: CPT

## 2019-04-18 RX ORDER — IOPAMIDOL 755 MG/ML
500 INJECTION, SOLUTION INTRAVASCULAR ONCE
Status: COMPLETED | OUTPATIENT
Start: 2019-04-18 | End: 2019-04-18

## 2019-04-18 RX ADMIN — SODIUM CHLORIDE 80 ML: 9 INJECTION, SOLUTION INTRAVENOUS at 11:35

## 2019-04-18 RX ADMIN — IOPAMIDOL 80 ML: 755 INJECTION, SOLUTION INTRAVENOUS at 11:35

## 2019-04-19 VITALS — DIASTOLIC BLOOD PRESSURE: 84 MMHG | HEART RATE: 91 BPM | SYSTOLIC BLOOD PRESSURE: 153 MMHG | OXYGEN SATURATION: 98 %

## 2019-04-19 DIAGNOSIS — I10 HYPERTENSION GOAL BP (BLOOD PRESSURE) < 140/90: ICD-10-CM

## 2019-04-19 RX ORDER — LOSARTAN POTASSIUM AND HYDROCHLOROTHIAZIDE 12.5; 5 MG/1; MG/1
1 TABLET ORAL DAILY
Qty: 90 TABLET | Refills: 2 | Status: CANCELLED | OUTPATIENT
Start: 2019-04-19

## 2019-04-19 NOTE — PROGRESS NOTES
Patient Name: Austin Garza  Patient MRN: [id number]  Patient : 1940    HPI: This is a 78 year old year old male presenting for follow-up evaluation of failed attempt of embolization of Type II endoleak status post endovascular repair of abdominal aortic aneurysm.     The patient had a endovascular aortic aneurysm stent graft repair on 2017 with Dr. Verma and Dr. Cazares.  The patient was seen for his 1 year follow up with Dr. Cazares at [facility].  His CTA angiogram that was performed on 2019 showed a persistent Type II endoleak that most likely arising from the YOHAN or lumbar artery.  Dr. Cazares discussed with the patient and wife to pursue abdominal aortogram with assessment of the locations of the Type II endoleak with possible embolization for increasing sac size.    The patient was brought to Interventional radiology on 3/11/2019, abdominal aortogram was performed.  Numerous unsuccessful attempts were made to access the middle colic artery which unfortunately resulted in a non-flow limiting dissection.  Numerous additional attempts were made to select the right and middle colic arteries however this was ultimately unsuccessful.  Ultimately the decision was made to terminate the procedure.  The final images showed continued flow within middle and right colic branches and the dissection.    Today, the patient presents to clinic with his wife to discuss further options for treatment of Type II endoleak.    He further denies abdominal or back pain.  He denies post prandial pain.  His bowel pattern is normal.     He remains on Plavix and ASA 81 mg without unusual bleeding.  OBJECTIVE:   /84   Pulse 91   SpO2 98%     General Appearance: WDWN male in NAD  Lower Extremity: no swelling, ambulating without claudicaiton.  Current Outpatient Medications   Medication     aspirin (ASA) 81 MG tablet     atorvastatin (LIPITOR) 20 MG tablet     blood glucose (ONETOUCH ULTRA) test strip      Blood Glucose Monitoring Suppl (ONE TOUCH ULTRA 2) W/DEVICE KIT     clopidogrel (PLAVIX) 75 MG tablet     fluticasone (FLONASE) 50 MCG/ACT nasal spray     glimepiride (AMARYL) 1 MG tablet     losartan-hydrochlorothiazide (HYZAAR) 50-12.5 MG tablet     metFORMIN (GLUCOPHAGE) 1000 MG tablet     Multiple Vitamins-Minerals (CENTRUM SILVER) per tablet     No current facility-administered medications for this visit.      Patient Active Problem List   Diagnosis     Hearing loss     Dysfunction of eustachian tube     Hypertension goal BP (blood pressure) < 140/90     Diminished Peripheral Pulse     Type 2 diabetes mellitus without complication, without long-term current use of insulin (H)     HYPERLIPIDEMIA LDL GOAL <100     Inflammatory Monoarthritis, Left Hip     Esophageal reflux     Overweight - BMI >35     BCC (basal cell carcinoma), face     Skin lesion of back     CLL (chronic lymphocytic leukemia) (H)     Abdominal aortic aneurysm (AAA) without rupture (H)         Imaging:   Results for orders placed or performed during the hospital encounter of 04/18/19   CTA Abdomen with Contrast    Narrative    PROCEDURE:  CTA of the abdomen    DATE OF PROCEDURE:  4/18/2019 11:50 AM    CLINICAL HISTORY/INDICATION:  mesenteric CTA, for evaluation of SMA dissection.  Attempted TYpe II  embolization was done 3/11/2019.  Dissection of SMA; Injury of primary  branch of superior mesenteric artery, initial encounter    COMPARISON:  Angiogram 3/11/2019, CTA 1/11/2019.    DOSE:  mGy-cm: 254 mGy-cm    TECHNIQUE:  CT angiogram of the abdomen was performed following the administration  of intravenous contrast. Coronal and sagittal reformats were  performed. Radiation dose for this scan was reduced using automated  exposure control, adjustment of the mA and/or kV according to patient  size, or iterative reconstruction technique.     FINDINGS:  VASCULATURE:  Visualized portions of the thoracic aorta are nonaneurysmal. The  celiac and SMA  origins are patent. Bilateral renal arteries are  patent, the right renal artery origin appears stenotic. Interval  development of a dissection within the proximal superior mesenteric  artery extending to the ileocolic origin. Jejunal ileal and ileocolic  branches arise from the true lumen. The middle and right colic  branches appear to originate from the false lumen. All branches  opacify with contrast.    Continued type II endoleak arising from retrograde flow within the  inferior mesenteric artery. Aortic aneurysm sac size is unchanged, 6.5  x 7.5 cm. Bifurcated aortic endograft is patent without stenosis. No  evidence of type I endoleak.     ABDOMEN:  The liver is noncirrhotic in morphology without focal hepatic mass.  The gallbladder is nondistended without wall thickening or  pericholecystic abnormality. Hyperdensity in the gallbladder but  represents a small stone. The spleen is enlarged measuring 15 cm  craniocaudal. Bilateral kidneys are unremarkable without  hydronephrosis. Adrenal glands and pancreas are unremarkable. Numerous  enlarged lymph nodes around the aneurysm sac are not significantly  changed from 2017.      Impression    IMPRESSION:  1.  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 Excluded aneurysm sac size 6.5 x 7.5 cm is unchanged.  2.  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 Jejunal and ileocolic branches arise from the true  lumen with middle and right colic branches arising from the false  lumen.  3.  Cholelithiasis.  4.  Nonspecific splenomegaly..    LEONARD MERRITT MD         Assessment/Plan  (I71.4) Abdominal aortic aneurysm (AAA) without rupture (H)  (primary encounter diagnosis  Plan: IR Visceral Embolization        I had a lengthy discussion with the patient and wife today.  We reviewed the CTA of his abdomen/pelvis that was performed today.     There continues to be a persistent Type II endoleak, arising from  inferior mesenteric artery.  Previous attempts to cannulize this artery, which ultimately dissected the artery.  I feel that percutaneous approach into the aneurysm sac is our best option.  Possible anterior approach will be assessed with ultrasound at time of procedure. Will plan for Barry embolization.   This is done as a outpatient and if recovery goes as expected the patient will be discharged to home following his recovery period.      He will need to hold his Plavix for 5 days, and will need a pre-operative examination.      Met with the patient for 30 minutes of which >50% of the time was used to discuss treatment options and/or coordination of care.    Thank you for the consult. We will continue to monitor this patient for their vascular needs.    Julius Gauthier MD  Interventional Radiologist  [facility]  [phone]  Pager: [phone]

## 2019-04-19 NOTE — PATIENT INSTRUCTIONS
Patient scheduled for Percutaneous embolization of Type II endoleak on 5/13 at 730am with Dr. Gauthier, under moderate sedation  Patient will need a pre-operative examination  Instructed to hold Plavix for 7 days.

## 2019-04-22 RX ORDER — HYDROCHLOROTHIAZIDE 12.5 MG/1
25 TABLET ORAL DAILY
Qty: 30 TABLET | Refills: 0 | Status: SHIPPED | OUTPATIENT
Start: 2019-04-22 | End: 2019-05-02

## 2019-04-22 RX ORDER — LOSARTAN POTASSIUM 50 MG/1
50 TABLET ORAL DAILY
Qty: 30 TABLET | Refills: 0 | Status: SHIPPED | OUTPATIENT
Start: 2019-04-22 | End: 2019-05-02

## 2019-04-25 DIAGNOSIS — I10 HYPERTENSION GOAL BP (BLOOD PRESSURE) < 140/90: ICD-10-CM

## 2019-04-25 NOTE — TELEPHONE ENCOUNTER
"Requested Prescriptions   Pending Prescriptions Disp Refills     losartan (COZAAR) 50 MG tablet  Last Written Prescription Date:  04/22/2019  Last Fill Quantity: 30 tablet,  # refills: 0    Last office visit: 3/6/2019 with prescribing provider:  Julio Dalton MD        Future Office Visit:   Next 5 appointments (look out 90 days)    May 01, 2019  1:30 PM CDT  Pre-Op physical with Jey Casillas MD  [facility] [address] [place] [address]  [address]  [facility] [address]  [phone]   Jun 13, 2019  3:45 PM CDT  Return Visit with Breana Perry MD  [facility] ([facility]  [id number] [place] [address]  [facility] [address]  [facility] [id number] [phone]          30 tablet 0     Sig: Take 1 tablet (50 mg) by mouth daily       Angiotensin-II Receptors Failed - 4/25/2019  2:36 PM        Failed - Blood pressure under 140/90 in past 12 months     BP Readings from Last 3 Encounters:   04/19/19 153/84   03/22/19 132/79   03/14/19 138/81                 Failed - Normal serum creatinine on file in past 12 months     Recent Labs   Lab Test 04/18/19  1117 03/11/19  0700   CR  --  1.38*   CREAT 1.6*  --              Passed - Recent (12 mo) or future (30 days) visit within the authorizing provider's specialty     Patient had office visit in the last 12 months or has a visit in the next 30 days with authorizing provider or within the authorizing provider's specialty.  See \"Patient Info\" tab in inbasket, or \"Choose Columns\" in Meds & Orders section of the refill encounter.              Passed - Medication is active on med list        Passed - Patient is age 18 or older        Passed - Normal serum potassium on file in past 12 months     Recent Labs   Lab Test 03/06/19  0949   POTASSIUM 4.2                      "

## 2019-04-29 RX ORDER — LOSARTAN POTASSIUM 50 MG/1
50 TABLET ORAL DAILY
Qty: 30 TABLET | Refills: 0 | OUTPATIENT
Start: 2019-04-29

## 2019-05-01 ENCOUNTER — OFFICE VISIT (OUTPATIENT)
Dept: PEDIATRICS | Facility: CLINIC | Age: 79
End: 2019-05-01
Payer: COMMERCIAL

## 2019-05-01 VITALS
TEMPERATURE: 97.7 F | WEIGHT: 212.1 LBS | HEART RATE: 96 BPM | OXYGEN SATURATION: 100 % | BODY MASS INDEX: 31.42 KG/M2 | DIASTOLIC BLOOD PRESSURE: 70 MMHG | SYSTOLIC BLOOD PRESSURE: 134 MMHG | HEIGHT: 69 IN

## 2019-05-01 DIAGNOSIS — E11.9 TYPE 2 DIABETES MELLITUS WITHOUT COMPLICATION, WITHOUT LONG-TERM CURRENT USE OF INSULIN (H): ICD-10-CM

## 2019-05-01 DIAGNOSIS — C91.10 CLL (CHRONIC LYMPHOCYTIC LEUKEMIA) (H): ICD-10-CM

## 2019-05-01 DIAGNOSIS — I10 HYPERTENSION GOAL BP (BLOOD PRESSURE) < 140/90: ICD-10-CM

## 2019-05-01 DIAGNOSIS — I71.40 ABDOMINAL AORTIC ANEURYSM (AAA) WITHOUT RUPTURE (H): ICD-10-CM

## 2019-05-01 DIAGNOSIS — Z01.818 PREOP GENERAL PHYSICAL EXAM: Primary | ICD-10-CM

## 2019-05-01 PROCEDURE — 80048 BASIC METABOLIC PNL TOTAL CA: CPT | Performed by: INTERNAL MEDICINE

## 2019-05-01 PROCEDURE — 99215 OFFICE O/P EST HI 40 MIN: CPT | Mod: GC | Performed by: STUDENT IN AN ORGANIZED HEALTH CARE EDUCATION/TRAINING PROGRAM

## 2019-05-01 PROCEDURE — 85025 COMPLETE CBC W/AUTO DIFF WBC: CPT | Performed by: INTERNAL MEDICINE

## 2019-05-01 PROCEDURE — 36415 COLL VENOUS BLD VENIPUNCTURE: CPT | Performed by: INTERNAL MEDICINE

## 2019-05-01 ASSESSMENT — MIFFLIN-ST. JEOR: SCORE: 1682.46

## 2019-05-01 NOTE — PATIENT INSTRUCTIONS
- hold plavix (clopidogrel) 5 days prior to procedure  - can continue aspirin unless radiologist tell you to hold off  - for diabetes, hold metformin and glimepiride the morning of procedure  - for blood pressure, hold the blood pressure medication in the morning of the procedure; it is okay to take in the day before  - will get complete blood count and BMP today; will message you the results      Before Your Surgery    Call your surgeon if there is any change in your health. This includes signs of a cold or flu (such as a sore throat, runny nose, cough, rash or fever).    Do not smoke, drink alcohol or take over the counter medicine (unless your surgeon or primary care doctor tells you to) for the 24 hours before and after surgery.    If you take prescribed drugs: Follow your doctor s orders about which medicines to take and which to stop until after surgery.    Eating and drinking prior to surgery: follow the instructions from your surgeon    Take a shower or bath the night before surgery. Use the soap your surgeon gave you to gently clean your skin. If you do not have soap from your surgeon, use your regular soap. Do not shave or scrub the surgery site.  Wear clean pajamas and have clean sheets on your bed.

## 2019-05-01 NOTE — PROGRESS NOTES
[facility]  [address]  [address]  [place] [address]  [phone]  Dept: [phone]    PRE-OP EVALUATION:  Today's date: 2019    Austin Garza (: 1940) presents for pre-operative evaluation assessment as requested by Dr. Gauthier.  He requires evaluation and anesthesia risk assessment prior to undergoing surgery/procedure for treatment of angioplasty .    Fax number for surgical facility: [facility]  Primary Physician: Sandi Eastman  Type of Anesthesia Anticipated: to be determined    Patient has a Health Care Directive or Living Will:  YES     Preop Questions 2019   Who is doing your surgery? Disha   What are you having done? Percutaneous angioplasty and embolization   Date of Surgery/Procedure: May 13   Facility or Hospital where procedure/surgery will be performed: [facility].   1.  Do you have a history of Heart attack, stroke, stent, coronary bypass surgery, or other heart surgery? No   2.  Do you ever have any pain or discomfort in your chest? No   3.  Do you have a history of  Heart Failure? No   4.   Are you troubled by shortness of breath when:  walking on a level surface, or up a slight hill, or at night? YES - chronic, due to anemia in setting of stable CLL   5.  Do you currently have a cold, bronchitis or other respiratory infection? No   6.  Do you have a cough, shortness of breath, or wheezing? YES - see above   7.  Do you sometimes get pains in the calves of your legs when you walk? -No   8. Do you or anyone in your family have previous history of blood clots? -No   9.  Do you or does anyone in your family have a serious bleeding problem such as prolonged bleeding following surgeries or cuts? -No   10. Have you ever had problems with anemia or been told to take iron pills? YES - chronic anemia   11. Have you had any abnormal blood loss such as black, tarry or bloody stools? -No   12. Have you ever had a blood transfusion? -UNKNOWN   13.  Have you or any of your relatives ever had problems with anesthesia? -No   14. Do you have sleep apnea, excessive snoring or daytime drowsiness? -No   15. Do you have any prosthetic heart valves? -No   16. Do you have prosthetic joints? -No         HPI:     HPI related to upcoming procedure: Hx of AAA w/ endovascular stent that has persistent endoleak. Had aortogram in March but could not embolize and eventually had dissection. Stable since then. Seen at radiology clinic on 4/18 and planned for percutaneous approach with [place] embolization. Denies any abdominal pain.    DIABETES - Patient has a longstanding history of DiabetesType Type II. Patient is being treated with oral agents and denies significant side effects. Control has been good. Complicating factors include but are not limited to: hypertension and hyperlipidemia.                                                                                                                            .  HYPERLIPIDEMIA - Patient has a long history of significant Hyperlipidemia requiring medication for treatment with recent good control. Patient reports no problems or side effects with the medication.    HYPERTENSION - Patient has longstanding history of HTN , currently denies any symptoms referable to elevated blood pressure. Specifically denies chest pain, palpitations, dyspnea, orthopnea, PND or peripheral edema. Blood pressure readings have been in normal range. Current medication regimen is as listed below. Patient denies any side effects of medication.                         CLL - Baseline WBC in 70s. Also has chronic anemia. Has mild shortness of breath but otherwise doing well. No easy bruising or bleeding. Not on active treatment for this, monitored every 3 months by Oncology.    MEDICAL HISTORY:     Patient Active Problem List    Diagnosis Date Noted     Abdominal aortic aneurysm (AAA) without rupture (H) 11/29/2017     Priority: Medium     CLL (chronic  lymphocytic leukemia) (H) 11/17/2017     Priority: Medium     BCC (basal cell carcinoma), face 10/23/2015     Priority: Medium     Excised 10/22/15. R temporal area.       Skin lesion of back 10/23/2015     Priority: Medium     Pre-cancerous. Excised 10/22/15       Overweight - BMI >35 10/22/2015     Priority: Medium     Esophageal reflux 11/26/2012     Priority: Medium     Inflammatory Monoarthritis, Left Hip 08/12/2010     Priority: Medium     Unrevealing arthrocentesis except for white cells       HYPERLIPIDEMIA LDL GOAL <100 02/10/2010     Priority: Medium     Type 2 diabetes mellitus without complication, without long-term current use of insulin (H) 01/21/2010     Priority: Medium     Diminished Peripheral Pulse 02/12/2007     Priority: Medium     Hypertension goal BP (blood pressure) < 140/90 12/02/2004     Priority: Medium     Hearing loss 07/31/2003     Priority: Medium     Problem list name updated by automated process. Provider to review       Dysfunction of eustachian tube 07/31/2003     Priority: Medium      Past Medical History:   Diagnosis Date     Diaphragmatic hernia without mention of obstruction or gangrene      Diverticulitis of colon (without mention of hemorrhage)(562.11)      Esophageal reflux      Irritable bowel syndrome      Macular degeneration (senile) of retina, unspecified      Squamous Cell Carcinoma, Back 1988     Type II or unspecified type diabetes mellitus without mention of complication, not stated as uncontrolled      Unspecified essential hypertension      Past Surgical History:   Procedure Laterality Date     APPENDECTOMY  1966     BONE MARROW BIOPSY, BONE SPECIMEN, NEEDLE/TROCAR N/A 11/21/2017    Procedure: BIOPSY BONE MARROW;  BONE MARROW BIOPSY;  Surgeon: Shady Garcia MD;  Location: [facility]     COLONOSCOPY       ENDOVASCULAR REPAIR ANEURYSM ABDOMINAL AORTA N/A 12/4/2017    Procedure: ENDOVASCULAR REPAIR ANEURYSM ABDOMINAL AORTA;  ENDOVASCULAR REPAIR ANEURYSM  ABDOMINAL AORTA;  Surgeon: Milton Cazares MD;  Location: [facility] OR     IR ABDOMINAL AORTOGRAM  3/11/2019     SURGICAL HISTORY OF -   -    Squamous cell skin cancer resection - back     SURGICAL HISTORY OF -       skin tags resection     SURGICAL HISTORY OF -   2001    stress thall. normal     SURGICAL HISTORY OF -   2002    colonoscopy     SURGICAL HISTORY OF -       Fistulotomy with marsupialization for repair of fisture in ano     Current Outpatient Medications   Medication Sig Dispense Refill     aspirin (ASA) 81 MG tablet Take 1 tablet (81 mg) by mouth every evening 90 tablet 1     atorvastatin (LIPITOR) 20 MG tablet Take 1 tablet (20 mg) by mouth At Bedtime 90 tablet 2     blood glucose (ONETOUCH ULTRA) test strip Test 2-3 times daily as directed, brand per insurance formulary. 100 each 1     Blood Glucose Monitoring Suppl (ONE TOUCH ULTRA 2) W/DEVICE KIT Use to test blood sugars 2-3 times daily as directed. Brand per insurance formulary   1 kit 0     clopidogrel (PLAVIX) 75 MG tablet Take 1 tablet (75 mg) by mouth daily 90 tablet 1     fluticasone (FLONASE) 50 MCG/ACT nasal spray Spray 2 sprays into both nostrils 2 times daily 1 Bottle 9     glimepiride (AMARYL) 1 MG tablet Take 1 tablet (1 mg) by mouth every morning (before breakfast) 90 tablet 0     losartan-hydrochlorothiazide (HYZAAR) 50-12.5 MG tablet Take 1 tablet by mouth daily 90 tablet 2     metFORMIN (GLUCOPHAGE) 1000 MG tablet Take 1 tablet (1,000 mg) by mouth 2 times daily (with meals) 180 tablet 0     Multiple Vitamins-Minerals (CENTRUM SILVER) per tablet Take 1 tablet by mouth daily       OTC products: herbals and vitamins - multivitamin and Aspirin    Allergies   Allergen Reactions     Ace Inhibitors      cough      Latex Allergy: NO    Social History     Tobacco Use     Smoking status: Former Smoker     Packs/day: 0.50     Years: 20.00     Pack years: 10.00     Last attempt to quit: 1975     Years since quittin.3  "    Smokeless tobacco: Former User   Substance Use Topics     Alcohol use: Yes     Alcohol/week: 6.0 - 8.4 oz     Types: 10 - 14 Standard drinks or equivalent per week     Comment: 2 drinks a week     History   Drug Use No       REVIEW OF SYSTEMS:   CONSTITUTIONAL: NEGATIVE for fever  INTEGUMENTARY/SKIN: NEGATIVE for worrisome rashes  EYES: NEGATIVE for vision changes or irritation  ENT/MOUTH: POSITIVE for congestion and hard of hearing  RESP: POSITIVE for chronic SOB; NEGATIVE for significant cough  CV: NEGATIVE for chest pain, palpitations or peripheral edema  GI: NEGATIVE for nausea, abdominal pain, heartburn, or change in bowel habits  : NEGATIVE for dysuria or hematuria  MUSCULOSKELETAL: NEGATIVE for significant arthralgias or myalgia  NEURO: NEGATIVE for weakness  HEME: NEGATIVE for bleeding problems  PSYCHIATRIC: NEGATIVE for changes in mood or affect    EXAM:   /70 (BP Location: Right arm, Patient Position: Chair, Cuff Size: Adult Large)   Pulse 96   Temp 97.7  F (36.5  C) (Oral)   Ht 1.753 m (5' 9\")   Wt 96.2 kg (212 lb 1.6 oz)   SpO2 100%   BMI 31.32 kg/m         GENERAL APPEARANCE: Alert, obese, NAD     EYES: EOMI,  PERRL, normal conjunctivae, anicteric sclerae     HENT: ear canals and TM's normal and nose and mouth without ulcers or lesions, MMM     NECK: no adenopathy, no asymmetry, masses, or scars     RESP: lungs clear to auscultation - no rales, rhonchi or wheezes     CV: regular rates and rhythm, normal S1 S2, no S3 or S4 and no murmur, click or rub     ABDOMEN:  soft, ND/NT abdomen, no masses and bowel sounds normal     MS: extremities normal- no gross deformities noted, no evidence of inflammation in joints, FROM in all extremities.     SKIN: no suspicious lesions or rashes     NEURO: Alert, oriented, CN II-XII intact, normal tone and 5/5 strength in all extremities, sensory exam grossly normal, mentation intact and speech normal     PSYCH: mentation appears normal. and affect " normal/bright     LYMPHATICS: No cervical adenopathy    DIAGNOSTICS:   CBC:  - WBC 51  - Hgb 9.3  - PLT 99    BMP: pending     IMPRESSION:   Reason for surgery/procedure: AAA with endovascular leak  Diagnosis/reason for consult: pre-op evaluation for AAA    The proposed surgical procedure is considered HIGH risk.    REVISED CARDIAC RISK INDEX  The patient has the following serious cardiovascular risks for perioperative complications such as (MI, PE, VFib and 3  AV Block):  High risk surgery (>5% cardiac complication risk)  Diabetes Mellitus (on Insulin)  INTERPRETATION: 1 risks: Class II (low risk - 0.9% complication rate)    The patient has the following additional risks for perioperative complications:  No identified additional risks      ICD-10-CM    1. Preop general physical exam Z01.818 Basic metabolic panel  (Ca, Cl, CO2, Creat, Gluc, K, Na, BUN)     CBC with platelets and differential   2. Abdominal aortic aneurysm (AAA) without rupture (H) I71.4    3. Type 2 diabetes mellitus without complication, without long-term current use of insulin (H) E11.9    4. Hypertension goal BP (blood pressure) < 140/90 I10    5. CLL (chronic lymphocytic leukemia) (H) C91.90        RECOMMENDATIONS:     Consult hospital rounder / IM to assist post-op medical management if patient remains hospitalized.    Anemia  Anemia is chronic and secondary to CLL. This is stable and does not require treatment prior to surgery.  Monitor Hemoglobin postoperatively.    Patient is to take all scheduled medications on the day of surgery EXCEPT for modifications listed below:  Diabetes Medication Use  - hold metformin and glimepiride the morning of procedure  Anticoagulant or Antiplatelet Medication Use  ASPIRIN: Patient is at increased risk of thrombosis (e.g. MI, CVA) and aspirin 81 mg daily should be continued in the perioperative period  Clopidogrel: hold 5 day prior to procedure    ACE Inhibitor or Angiotensin Receptor Blocker (ARB) Use  -  hold losartan-hydrochlorothiazide the morning of the procedure due to chronic kidney disease (creatinine 1.3-1.5 at baseline and patient will be receiving contrast during procedure)      APPROVAL GIVEN to proceed with proposed procedure, without further diagnostic evaluation    Patient seen and discussed with Dr. Eastman     Signed Electronically by: Jey Casillas MD    Copy of this evaluation report is provided to requesting physician.    [place] Preop Guidelines    Revised Cardiac Risk Index    I have seen and discussed the patient with Dr. Casillas and agree with the jointly developed plan as documented above.    Sandi Eastman MD  Internal Medicine-Pediatrics

## 2019-05-02 LAB
ANION GAP SERPL CALCULATED.3IONS-SCNC: 7 MMOL/L (ref 3–14)
ANISOCYTOSIS BLD QL SMEAR: SLIGHT
BUN SERPL-MCNC: 28 MG/DL (ref 7–30)
CALCIUM SERPL-MCNC: 9.4 MG/DL (ref 8.5–10.1)
CHLORIDE SERPL-SCNC: 107 MMOL/L (ref 94–109)
CO2 SERPL-SCNC: 27 MMOL/L (ref 20–32)
CREAT SERPL-MCNC: 1.54 MG/DL (ref 0.66–1.25)
DACRYOCYTES BLD QL SMEAR: SLIGHT
DIFFERENTIAL METHOD BLD: ABNORMAL
ELLIPTOCYTES BLD QL SMEAR: SLIGHT
ERYTHROCYTE [DISTWIDTH] IN BLOOD BY AUTOMATED COUNT: 16.6 % (ref 10–15)
GFR SERPL CREATININE-BSD FRML MDRD: 43 ML/MIN/{1.73_M2}
GLUCOSE SERPL-MCNC: 122 MG/DL (ref 70–99)
HCT VFR BLD AUTO: 29.1 % (ref 40–53)
HGB BLD-MCNC: 9.3 G/DL (ref 13.3–17.7)
LYMPHOCYTES # BLD AUTO: 49.1 10E9/L (ref 0.8–5.3)
LYMPHOCYTES NFR BLD AUTO: 96 %
MCH RBC QN AUTO: 34.6 PG (ref 26.5–33)
MCHC RBC AUTO-ENTMCNC: 32 G/DL (ref 31.5–36.5)
MCV RBC AUTO: 108 FL (ref 78–100)
METAMYELOCYTES # BLD: 0.5 10E9/L
METAMYELOCYTES NFR BLD MANUAL: 1 %
NEUTROPHILS # BLD AUTO: 1.5 10E9/L (ref 1.6–8.3)
NEUTROPHILS NFR BLD AUTO: 3 %
OVALOCYTES BLD QL SMEAR: SLIGHT
PLATELET # BLD AUTO: 99 10E9/L (ref 150–450)
PLATELET # BLD EST: ABNORMAL 10*3/UL
POIKILOCYTOSIS BLD QL SMEAR: SLIGHT
POTASSIUM SERPL-SCNC: 4.4 MMOL/L (ref 3.4–5.3)
RBC # BLD AUTO: 2.69 10E12/L (ref 4.4–5.9)
RBC INCLUSIONS BLD: SLIGHT
SODIUM SERPL-SCNC: 141 MMOL/L (ref 133–144)
WBC # BLD AUTO: 51.1 10E9/L (ref 4–11)

## 2019-05-13 ENCOUNTER — APPOINTMENT (OUTPATIENT)
Dept: INTERVENTIONAL RADIOLOGY/VASCULAR | Facility: CLINIC | Age: 79
End: 2019-05-13
Attending: RADIOLOGY
Payer: COMMERCIAL

## 2019-05-13 ENCOUNTER — HOSPITAL ENCOUNTER (OUTPATIENT)
Facility: CLINIC | Age: 79
Discharge: HOME | End: 2019-05-13
Attending: RADIOLOGY | Admitting: RADIOLOGY
Payer: COMMERCIAL

## 2019-05-13 VITALS
BODY MASS INDEX: 31.42 KG/M2 | HEIGHT: 69 IN | RESPIRATION RATE: 16 BRPM | TEMPERATURE: 98.3 F | HEART RATE: 88 BPM | WEIGHT: 212.1 LBS | SYSTOLIC BLOOD PRESSURE: 156 MMHG | DIASTOLIC BLOOD PRESSURE: 89 MMHG | OXYGEN SATURATION: 95 %

## 2019-05-13 DIAGNOSIS — I71.40 ABDOMINAL AORTIC ANEURYSM (AAA) WITHOUT RUPTURE (H): ICD-10-CM

## 2019-05-13 LAB
ANION GAP SERPL CALCULATED.3IONS-SCNC: 6 MMOL/L (ref 3–14)
APTT PPP: 26 SEC (ref 22–37)
BUN SERPL-MCNC: 24 MG/DL (ref 7–30)
CALCIUM SERPL-MCNC: 8.1 MG/DL (ref 8.5–10.1)
CHLORIDE SERPL-SCNC: 109 MMOL/L (ref 94–109)
CO2 SERPL-SCNC: 26 MMOL/L (ref 20–32)
CREAT SERPL-MCNC: 1.41 MG/DL (ref 0.66–1.25)
ERYTHROCYTE [DISTWIDTH] IN BLOOD BY AUTOMATED COUNT: 17 % (ref 10–15)
GFR SERPL CREATININE-BSD FRML MDRD: 48 ML/MIN/{1.73_M2}
GLUCOSE SERPL-MCNC: 150 MG/DL (ref 70–99)
HCT VFR BLD AUTO: 27 % (ref 40–53)
HGB BLD-MCNC: 8.9 G/DL (ref 13.3–17.7)
INR PPP: 1.08 (ref 0.86–1.14)
MCH RBC QN AUTO: 34.6 PG (ref 26.5–33)
MCHC RBC AUTO-ENTMCNC: 33 G/DL (ref 31.5–36.5)
MCV RBC AUTO: 105 FL (ref 78–100)
PLATELET # BLD AUTO: 102 10E9/L (ref 150–450)
POTASSIUM SERPL-SCNC: 4.7 MMOL/L (ref 3.4–5.3)
RBC # BLD AUTO: 2.57 10E12/L (ref 4.4–5.9)
SODIUM SERPL-SCNC: 141 MMOL/L (ref 133–144)
WBC # BLD AUTO: 60.4 10E9/L (ref 4–11)

## 2019-05-13 PROCEDURE — 80048 BASIC METABOLIC PNL TOTAL CA: CPT | Performed by: RADIOLOGY

## 2019-05-13 PROCEDURE — 40000853 ZZH STATISTIC ANGIOGRAM, STENT, VERTEBRO PLASTY

## 2019-05-13 PROCEDURE — 99153 MOD SED SAME PHYS/QHP EA: CPT

## 2019-05-13 PROCEDURE — 27210906 ZZH KIT CR8

## 2019-05-13 PROCEDURE — 25500064 ZZH RX 255 OP 636: Performed by: RADIOLOGY

## 2019-05-13 PROCEDURE — 25000128 H RX IP 250 OP 636: Performed by: RADIOLOGY

## 2019-05-13 PROCEDURE — 85730 THROMBOPLASTIN TIME PARTIAL: CPT | Performed by: RADIOLOGY

## 2019-05-13 PROCEDURE — 25000125 ZZHC RX 250

## 2019-05-13 PROCEDURE — 27211078 ZZH GLUE EMBOLI CR28

## 2019-05-13 PROCEDURE — 27210742 ZZH CATH CR1

## 2019-05-13 PROCEDURE — C1769 GUIDE WIRE: HCPCS

## 2019-05-13 PROCEDURE — 36415 COLL VENOUS BLD VENIPUNCTURE: CPT

## 2019-05-13 PROCEDURE — 85610 PROTHROMBIN TIME: CPT | Performed by: RADIOLOGY

## 2019-05-13 PROCEDURE — 85027 COMPLETE CBC AUTOMATED: CPT | Performed by: RADIOLOGY

## 2019-05-13 PROCEDURE — 27210746 ZZH CATH CR16

## 2019-05-13 PROCEDURE — 25800030 ZZH RX IP 258 OP 636: Performed by: RADIOLOGY

## 2019-05-13 PROCEDURE — 27210738 ZZH ACCESSORY CR2

## 2019-05-13 PROCEDURE — 27210886 ZZH ACCESSORY CR5

## 2019-05-13 PROCEDURE — 25000128 H RX IP 250 OP 636

## 2019-05-13 RX ORDER — CEFAZOLIN SODIUM 2 G/100ML
INJECTION, SOLUTION INTRAVENOUS
Status: COMPLETED
Start: 2019-05-13 | End: 2019-05-13

## 2019-05-13 RX ORDER — SODIUM CHLORIDE 9 MG/ML
INJECTION, SOLUTION INTRAVENOUS CONTINUOUS
Status: DISCONTINUED | OUTPATIENT
Start: 2019-05-13 | End: 2019-05-13 | Stop reason: HOSPADM

## 2019-05-13 RX ORDER — NICOTINE POLACRILEX 4 MG
15-30 LOZENGE BUCCAL
Status: DISCONTINUED | OUTPATIENT
Start: 2019-05-13 | End: 2019-05-13 | Stop reason: HOSPADM

## 2019-05-13 RX ORDER — ACETAMINOPHEN 500 MG
500 TABLET ORAL EVERY 6 HOURS PRN
Status: DISCONTINUED | OUTPATIENT
Start: 2019-05-13 | End: 2019-05-13 | Stop reason: HOSPADM

## 2019-05-13 RX ORDER — NALOXONE HYDROCHLORIDE 0.4 MG/ML
.1-.4 INJECTION, SOLUTION INTRAMUSCULAR; INTRAVENOUS; SUBCUTANEOUS
Status: DISCONTINUED | OUTPATIENT
Start: 2019-05-13 | End: 2019-05-13 | Stop reason: HOSPADM

## 2019-05-13 RX ORDER — DEXTROSE MONOHYDRATE 25 G/50ML
25-50 INJECTION, SOLUTION INTRAVENOUS
Status: DISCONTINUED | OUTPATIENT
Start: 2019-05-13 | End: 2019-05-13 | Stop reason: HOSPADM

## 2019-05-13 RX ORDER — IOPAMIDOL 612 MG/ML
150 INJECTION, SOLUTION INTRAVASCULAR ONCE
Status: COMPLETED | OUTPATIENT
Start: 2019-05-13 | End: 2019-05-13

## 2019-05-13 RX ORDER — FENTANYL CITRATE 50 UG/ML
25-50 INJECTION, SOLUTION INTRAMUSCULAR; INTRAVENOUS EVERY 5 MIN PRN
Status: DISCONTINUED | OUTPATIENT
Start: 2019-05-13 | End: 2019-05-13 | Stop reason: HOSPADM

## 2019-05-13 RX ORDER — CEFAZOLIN SODIUM 2 G/100ML
2 INJECTION, SOLUTION INTRAVENOUS
Status: COMPLETED | OUTPATIENT
Start: 2019-05-13 | End: 2019-05-13

## 2019-05-13 RX ORDER — FENTANYL CITRATE 50 UG/ML
INJECTION, SOLUTION INTRAMUSCULAR; INTRAVENOUS
Status: COMPLETED
Start: 2019-05-13 | End: 2019-05-13

## 2019-05-13 RX ORDER — LIDOCAINE 40 MG/G
CREAM TOPICAL
Status: DISCONTINUED | OUTPATIENT
Start: 2019-05-13 | End: 2019-05-13 | Stop reason: HOSPADM

## 2019-05-13 RX ORDER — FLUMAZENIL 0.1 MG/ML
0.2 INJECTION, SOLUTION INTRAVENOUS
Status: DISCONTINUED | OUTPATIENT
Start: 2019-05-13 | End: 2019-05-13 | Stop reason: HOSPADM

## 2019-05-13 RX ORDER — LIDOCAINE HYDROCHLORIDE 10 MG/ML
INJECTION, SOLUTION INFILTRATION; PERINEURAL
Status: DISCONTINUED
Start: 2019-05-13 | End: 2019-05-13 | Stop reason: HOSPADM

## 2019-05-13 RX ORDER — HEPARIN SODIUM 200 [USP'U]/100ML
500 INJECTION, SOLUTION INTRAVENOUS CONTINUOUS PRN
Status: DISCONTINUED | OUTPATIENT
Start: 2019-05-13 | End: 2019-05-13 | Stop reason: HOSPADM

## 2019-05-13 RX ADMIN — HEPARIN SODIUM 10000 UNITS: 10000 INJECTION INTRAVENOUS; SUBCUTANEOUS at 08:15

## 2019-05-13 RX ADMIN — FENTANYL CITRATE 50 MCG: 50 INJECTION, SOLUTION INTRAMUSCULAR; INTRAVENOUS at 08:06

## 2019-05-13 RX ADMIN — IOPAMIDOL 10 ML: 612 INJECTION, SOLUTION INTRAVENOUS at 09:29

## 2019-05-13 RX ADMIN — MIDAZOLAM HYDROCHLORIDE 0.5 MG: 1 INJECTION, SOLUTION INTRAMUSCULAR; INTRAVENOUS at 08:41

## 2019-05-13 RX ADMIN — FENTANYL CITRATE 25 MCG: 50 INJECTION, SOLUTION INTRAMUSCULAR; INTRAVENOUS at 09:13

## 2019-05-13 RX ADMIN — MIDAZOLAM HYDROCHLORIDE 0.5 MG: 1 INJECTION, SOLUTION INTRAMUSCULAR; INTRAVENOUS at 08:33

## 2019-05-13 RX ADMIN — FENTANYL CITRATE 50 MCG: 50 INJECTION, SOLUTION INTRAMUSCULAR; INTRAVENOUS at 09:24

## 2019-05-13 RX ADMIN — MIDAZOLAM HYDROCHLORIDE 0.5 MG: 1 INJECTION, SOLUTION INTRAMUSCULAR; INTRAVENOUS at 09:13

## 2019-05-13 RX ADMIN — FENTANYL CITRATE 50 MCG: 50 INJECTION, SOLUTION INTRAMUSCULAR; INTRAVENOUS at 09:19

## 2019-05-13 RX ADMIN — CEFAZOLIN SODIUM 2 G: 2 INJECTION, SOLUTION INTRAVENOUS at 08:12

## 2019-05-13 RX ADMIN — SODIUM CHLORIDE: 9 INJECTION, SOLUTION INTRAVENOUS at 07:11

## 2019-05-13 RX ADMIN — MIDAZOLAM HYDROCHLORIDE 1 MG: 1 INJECTION, SOLUTION INTRAMUSCULAR; INTRAVENOUS at 09:20

## 2019-05-13 RX ADMIN — FENTANYL CITRATE 25 MCG: 50 INJECTION, SOLUTION INTRAMUSCULAR; INTRAVENOUS at 08:22

## 2019-05-13 RX ADMIN — FENTANYL CITRATE 25 MCG: 50 INJECTION, SOLUTION INTRAMUSCULAR; INTRAVENOUS at 08:33

## 2019-05-13 RX ADMIN — FENTANYL CITRATE 25 MCG: 50 INJECTION, SOLUTION INTRAMUSCULAR; INTRAVENOUS at 08:41

## 2019-05-13 RX ADMIN — MIDAZOLAM HYDROCHLORIDE 1 MG: 1 INJECTION, SOLUTION INTRAMUSCULAR; INTRAVENOUS at 08:07

## 2019-05-13 RX ADMIN — LIDOCAINE HYDROCHLORIDE 8 ML: 10 INJECTION, SOLUTION INFILTRATION; PERINEURAL at 08:25

## 2019-05-13 RX ADMIN — MIDAZOLAM HYDROCHLORIDE 0.5 MG: 1 INJECTION, SOLUTION INTRAMUSCULAR; INTRAVENOUS at 08:22

## 2019-05-13 ASSESSMENT — MIFFLIN-ST. JEOR: SCORE: 1682.71

## 2019-05-13 NOTE — IR NOTE
Interventional Radiology Intra-procedural Nursing Note    Patient Name: Austin Garza  Medical Record Number: [id number]  Today's Date: May 13, 2019    Start Time: 0818  End of procedure time: 0930  Procedure: Type 2 Endo Leak Embolization  Report given to: Radha Hernandez RN in Care Suites  Time pt departs:  0938    Other Notes: Pt. transferred to IR table. Prepped and draped appropriately. Monitoring equipment applied. NC applied. No complaints of pain at this time. Timeout recorded.    Micro catheter through Accustick catheter IR access to posterior left lower back.   Barry Emobolization Substance injected by Dr. Gauthier.    Puncture site dressed with dry gauze and covered with tegaderm.    Pt. Tolerated procedure well, relaxed.     Medications adminisitered:    Fentanyl 250 mcg IVP  Versed 4 mg IVP  Ancef 2g IV

## 2019-05-13 NOTE — PROGRESS NOTES
Admitted to Care Suites #16 for scheduled peripheral visceral angiogram/embolization. Plan of care explained and questions answered. To IR per bed.

## 2019-05-13 NOTE — DISCHARGE INSTRUCTIONS
Peripheral Angiogram Discharge Instructions - Left Lower Back insertion site    After you go home:      Have an adult stay with you until tomorrow.    Drink extra fluids for 2 days.    You may resume your normal diet.    No smoking       For 24 hours - due to the sedation you received:    Relax and take it easy.    Do NOT make any important or legal decisions.    Do NOT drive or operate machines at home or at work.    Do NOT drink alcohol.    Care of Groin Puncture Site:      For the first 24 hrs - check the puncture site every 1-2 hours while awake.    Remove the bandaid after 24 hours. If there is minor oozing, apply another bandaid and remove it after 12 hours.    It is normal to have a small bruise or pea size lump at the site.    You may shower tomorrow.  Do NOT take a bath, or use a hot tub or pool for at least 3 days. Do NOT scrub the site. Do not use lotion or powder near the puncture site.     Activity:            For 2 days:    No stooping or squatting    Do NOT do any heavy activity such as exercise, lifting, or straining.     No housework, yard work or any activity that make you sweat    Do NOT lift more than 10 pounds    Bleeding:      If you start bleeding from the site in your back, lie down flat and press firmly on/above the site for 10 minutes.     Once bleeding stops, lay flat for 2 hours.     Call [facility] as soon as you can.       Call 911 right away if you have heavy bleeding or bleeding that does not stop.      Medicines:     RESTART TAKING YOUR PLAVIX AND ASPIRIN TOMORROW,tUESDAY, 5/14/2019.    If you are on Metformin (Glucophage) and your GFR (kidney function level) is >30, you may continue taking your Metformin.    If you are taking an antiplatelet medication such as Plavix, do not stop taking it until you talk to your provider.       Take your medications, including blood thinners, unless your provider tells you not to.      If you have stopped any medicines, check with  your provider about when to restart them.        Follow Up Appointments:      Follow [facility] with [facility] as directed.    Call the clinic if:      You have increased pain or a large or growing hard lump around the site.    The site is red, swollen, hot or tender.    Blood or fluid is draining from the site.    You have chills or a fever greater than 101 F (38 C).    Your leg feels numb, cool or changes color.    You have hives, a rash or unusual itching.    New pain in the back or belly that you cannot control with Tylenol.    Any questions or concerns.    Other Instructions:      If you received a stent - carry your stent card with you at all times.      If you have questions or your original symptoms do not improve, call:         [facility] @ [phone]

## 2019-05-13 NOTE — PROCEDURES
RADIOLOGY POST PROCEDURE NOTE w/ SEDATION  Patient name: Austin Garza  MRN: [id number]  : 1940    Pre-procedure diagnosis: Type II endoleak  Post-procedure diagnosis: Same    Procedure Date/Time: May 13, 2019  9:43 AM  Procedure:   Percutaneous aneurysm sac puncture  Jacqueline emoblization Type II endoleak    Estimated blood loss: None  Specimen(s) collected with description: none    I determined this patient to be an appropriate candidate for the planned sedation and procedure and reassessed the patient IMMEDIATELY PRIOR to sedation and procedure.     The patient tolerated the procedure well with no immediate complications.  Significant findings:  18 vial of jacqueline with filling of proximal YOHAN.    See imaging dictation for procedural details.    Provider name: Julius Gauthier  Assistant(s):None

## 2019-05-13 NOTE — PROGRESS NOTES
Returned to [facility] post visceral angiogram/embolization at 0945. Site on left lower back soft, clean, dry with gauze and tegaderm. Denies any c/o pain, light headedness, or any other c/o. VSS. Up out of bed at 1145 after 2 hours bedrest, voided in BR, ambulated in halls. AVS reviewed and given to pt and wife. Very pleasant and cooperative. Discharged to home per w/c with wife driving in stable condition.

## 2019-05-14 ENCOUNTER — TELEPHONE (OUTPATIENT)
Dept: OTHER | Facility: CLINIC | Age: 79
End: 2019-05-14

## 2019-05-14 DIAGNOSIS — I71.40 ABDOMINAL AORTIC ANEURYSM (AAA) WITHOUT RUPTURE (H): Primary | ICD-10-CM

## 2019-05-14 NOTE — TELEPHONE ENCOUNTER
Detail Level: Detailed Spoke with patient, did have some soreness in back where the puncture site was.  Taking Tylenol for the discomfort.  Manageable.  Denies fever or chills, wife states, site is dry, no redness.  Recommend 6 mo f/u CTA with clinic consult with Dr. Gauthier.  Has my number if needed.  Lisette Hinojosa RN  IR nurse clinician  [phone]     Detail Level: Generalized Detail Level: Zone Detail Level: Simple

## 2019-05-29 ENCOUNTER — APPOINTMENT (OUTPATIENT)
Dept: ULTRASOUND IMAGING | Facility: CLINIC | Age: 79
End: 2019-05-29
Attending: EMERGENCY MEDICINE
Payer: COMMERCIAL

## 2019-05-29 ENCOUNTER — HOSPITAL ENCOUNTER (EMERGENCY)
Facility: CLINIC | Age: 79
Discharge: HOME | End: 2019-05-29
Attending: EMERGENCY MEDICINE | Admitting: EMERGENCY MEDICINE
Payer: COMMERCIAL

## 2019-05-29 ENCOUNTER — APPOINTMENT (OUTPATIENT)
Dept: GENERAL RADIOLOGY | Facility: CLINIC | Age: 79
End: 2019-05-29
Attending: EMERGENCY MEDICINE
Payer: COMMERCIAL

## 2019-05-29 VITALS
HEART RATE: 92 BPM | SYSTOLIC BLOOD PRESSURE: 134 MMHG | TEMPERATURE: 98.9 F | DIASTOLIC BLOOD PRESSURE: 84 MMHG | WEIGHT: 204 LBS | OXYGEN SATURATION: 99 % | RESPIRATION RATE: 18 BRPM | HEIGHT: 69 IN | BODY MASS INDEX: 30.21 KG/M2

## 2019-05-29 DIAGNOSIS — M25.471 RIGHT ANKLE EFFUSION: ICD-10-CM

## 2019-05-29 DIAGNOSIS — Z87.39 HISTORY OF INFLAMMATORY ARTHRITIS: ICD-10-CM

## 2019-05-29 DIAGNOSIS — M79.661 PAIN OF RIGHT LOWER LEG: ICD-10-CM

## 2019-05-29 LAB
ANION GAP SERPL CALCULATED.3IONS-SCNC: 6 MMOL/L (ref 3–14)
ANISOCYTOSIS BLD QL SMEAR: SLIGHT
BASOPHILS # BLD AUTO: 0 10E9/L (ref 0–0.2)
BASOPHILS NFR BLD AUTO: 0 %
BUN SERPL-MCNC: 26 MG/DL (ref 7–30)
CALCIUM SERPL-MCNC: 8.9 MG/DL (ref 8.5–10.1)
CHLORIDE SERPL-SCNC: 106 MMOL/L (ref 94–109)
CO2 SERPL-SCNC: 26 MMOL/L (ref 20–32)
CREAT SERPL-MCNC: 1.36 MG/DL (ref 0.66–1.25)
CRP SERPL-MCNC: 6.7 MG/L (ref 0–8)
CRYSTALS SNV MICRO: NORMAL
DACRYOCYTES BLD QL SMEAR: SLIGHT
DIFFERENTIAL METHOD BLD: ABNORMAL
ELLIPTOCYTES BLD QL SMEAR: SLIGHT
EOSINOPHIL # BLD AUTO: 0 10E9/L (ref 0–0.7)
EOSINOPHIL NFR BLD AUTO: 0 %
ERYTHROCYTE [DISTWIDTH] IN BLOOD BY AUTOMATED COUNT: 16.5 % (ref 10–15)
ERYTHROCYTE [SEDIMENTATION RATE] IN BLOOD BY WESTERGREN METHOD: 29 MM/H (ref 0–20)
GFR SERPL CREATININE-BSD FRML MDRD: 50 ML/MIN/{1.73_M2}
GLUCOSE SERPL-MCNC: 209 MG/DL (ref 70–99)
GRAM STN SPEC: NORMAL
GRAM STN SPEC: NORMAL
HCT VFR BLD AUTO: 27.1 % (ref 40–53)
HGB BLD-MCNC: 8.4 G/DL (ref 13.3–17.7)
LYMPHOCYTES # BLD AUTO: 56.6 10E9/L (ref 0.8–5.3)
LYMPHOCYTES NFR BLD AUTO: 93 %
MACROCYTES BLD QL SMEAR: PRESENT
MCH RBC QN AUTO: 33.6 PG (ref 26.5–33)
MCHC RBC AUTO-ENTMCNC: 31 G/DL (ref 31.5–36.5)
MCV RBC AUTO: 108 FL (ref 78–100)
MICROCYTES BLD QL SMEAR: PRESENT
MONOCYTES # BLD AUTO: 0.6 10E9/L (ref 0–1.3)
MONOCYTES NFR BLD AUTO: 1 %
NEUTROPHILS # BLD AUTO: 3.7 10E9/L (ref 1.6–8.3)
NEUTROPHILS NFR BLD AUTO: 6 %
PLATELET # BLD AUTO: 134 10E9/L (ref 150–450)
PLATELET # BLD EST: ABNORMAL 10*3/UL
POLYCHROMASIA BLD QL SMEAR: SLIGHT
POTASSIUM SERPL-SCNC: 4.9 MMOL/L (ref 3.4–5.3)
RBC # BLD AUTO: 2.5 10E12/L (ref 4.4–5.9)
SODIUM SERPL-SCNC: 138 MMOL/L (ref 133–144)
SPECIMEN SOURCE SNV: NORMAL
SPECIMEN SOURCE: NORMAL
WBC # BLD AUTO: 60.9 10E9/L (ref 4–11)

## 2019-05-29 PROCEDURE — 73610 X-RAY EXAM OF ANKLE: CPT | Mod: RT

## 2019-05-29 PROCEDURE — 87205 SMEAR GRAM STAIN: CPT | Performed by: EMERGENCY MEDICINE

## 2019-05-29 PROCEDURE — 93971 EXTREMITY STUDY: CPT | Mod: RT

## 2019-05-29 PROCEDURE — 87070 CULTURE OTHR SPECIMN AEROBIC: CPT | Performed by: EMERGENCY MEDICINE

## 2019-05-29 PROCEDURE — 85652 RBC SED RATE AUTOMATED: CPT | Performed by: EMERGENCY MEDICINE

## 2019-05-29 PROCEDURE — 20605 DRAIN/INJ JOINT/BURSA W/O US: CPT | Mod: RT

## 2019-05-29 PROCEDURE — 25000132 ZZH RX MED GY IP 250 OP 250 PS 637: Performed by: EMERGENCY MEDICINE

## 2019-05-29 PROCEDURE — 25000131 ZZH RX MED GY IP 250 OP 636 PS 637: Performed by: EMERGENCY MEDICINE

## 2019-05-29 PROCEDURE — 99285 EMERGENCY DEPT VISIT HI MDM: CPT | Mod: 25

## 2019-05-29 PROCEDURE — 85025 COMPLETE CBC W/AUTO DIFF WBC: CPT | Performed by: EMERGENCY MEDICINE

## 2019-05-29 PROCEDURE — 86140 C-REACTIVE PROTEIN: CPT | Performed by: EMERGENCY MEDICINE

## 2019-05-29 PROCEDURE — 80048 BASIC METABOLIC PNL TOTAL CA: CPT | Performed by: EMERGENCY MEDICINE

## 2019-05-29 PROCEDURE — 89060 EXAM SYNOVIAL FLUID CRYSTALS: CPT | Performed by: EMERGENCY MEDICINE

## 2019-05-29 RX ORDER — IBUPROFEN 600 MG/1
600 TABLET, FILM COATED ORAL ONCE
Status: COMPLETED | OUTPATIENT
Start: 2019-05-29 | End: 2019-05-29

## 2019-05-29 RX ORDER — OXYCODONE HYDROCHLORIDE 5 MG/1
5 TABLET ORAL ONCE
Status: DISCONTINUED | OUTPATIENT
Start: 2019-05-29 | End: 2019-05-29

## 2019-05-29 RX ORDER — LIDOCAINE HYDROCHLORIDE AND EPINEPHRINE 10; 10 MG/ML; UG/ML
1 INJECTION, SOLUTION INFILTRATION; PERINEURAL ONCE
Status: DISCONTINUED | OUTPATIENT
Start: 2019-05-29 | End: 2019-05-29 | Stop reason: HOSPADM

## 2019-05-29 RX ADMIN — IBUPROFEN 600 MG: 600 TABLET ORAL at 06:35

## 2019-05-29 RX ADMIN — DEXAMETHASONE 10 MG: 2 TABLET ORAL at 06:35

## 2019-05-29 ASSESSMENT — MIFFLIN-ST. JEOR: SCORE: 1645.72

## 2019-05-29 ASSESSMENT — ENCOUNTER SYMPTOMS
ARTHRALGIAS: 1
MYALGIAS: 1

## 2019-05-29 NOTE — DISCHARGE INSTRUCTIONS
You should make an appointment to follow-up with your primary care doctor.  Should your ankle become more swollen, turn red or you develop a fever he should return the emergency department right away.  Should your pain not be improved with ibuprofen he took and steroid you are unable to walk, he should also return as we could attempt to pull more fluid off at this point ankle.  There was no evidence of blood clot or fracture on your x-rays.

## 2019-05-29 NOTE — RESULT ENCOUNTER NOTE
[place] ED discharge antibiotic (if prescribed):  None  No changes in treatment per General culture protocol.

## 2019-05-29 NOTE — ED AVS SNAPSHOT
[facility] Emergency Department  [address]  [facility] [address]  Phone:  [phone]  Fax:  [phone]                                    Austin Garza   MRN: [id number]    Department:  [facility] Emergency Department   Date of Visit:  5/29/2019           After Visit Summary Signature Page    I have received my discharge instructions, and my questions have been answered. I have discussed any challenges I see with this plan with the nurse or doctor.    ..........................................................................................................................................  Patient/Patient Representative Signature      ..........................................................................................................................................  Patient Representative Print Name and Relationship to Patient    ..................................................               ................................................  Date                                   Time    ..........................................................................................................................................  Reviewed by Signature/Title    ...................................................              ..............................................  Date                                               Time          22EPIC Rev 08/18

## 2019-05-29 NOTE — ED PROVIDER NOTES
History     Chief Complaint:  Leg Pain     HPI   Austin Garza is a 78 year old male with a history of abdominal aortic aneurysm, diabetes, CLL, and BCC who presents with his wife to the Emergency Department today for evaluation of right leg pain. Patient reports he woke up a couple of hours ago this morning with right leg pain from his knee down his calf into his ankle. The calf pain is on the outside of his right leg. Patient reports the leg hurts to walk on and he has been using a cane due to the pain. He denies falls. No leg swelling. He is concerned about the possibility of a blood clot. He reports they returned from [place], [place] by car yesterday. He had an abdominal aortic aneurysm repair on May 13th.     Allergies:  Ace inhibitors: cough      Medications:    Aspirin 81 MG  Lipitor  Plavix  Amaryl  Hyzaar  Metformin  Multivitamin     Past Medical History:    Abdominal aortic aneurysm  CLL  BCC  Skin lesion of back  Overweight  Esophageal reflux  Monoarthritis  Hyperlipidemia  Diabetes  Diminished peripheral pulse  Hypertension  Hearing loss  Dysfunction of eustachian tube  Diaphragmatic hernia  Diverticulitis  IBS  Macular degeneration    Past Surgical History:    Appendectomy  Bone marrow biopsy  Endovascular repair aneurysm abdominal aorta  IR abdominal aortogram  IR visceral embolization  Squamous cell skin cancer back resection  Skin tags resection  Fistulotomy with marsupialization     Family History:    Cerebrovascular disease  Hypertension  CAD  Skin cancer  Glaucoma     Social History:  Smoking status: Former smoker, quit date 1/1/1975  Alcohol use: Yes  Marital Status:   [2]     Review of Systems   Cardiovascular: Negative for leg swelling.   Musculoskeletal: Positive for arthralgias and myalgias.   All other systems reviewed and are negative.      Physical Exam     Patient Vitals for the past 24 hrs:   BP Temp Temp src Pulse Resp SpO2 Height Weight   05/29/19 0409 (!) 147/91 98.9  F  "(37.2  C) Temporal 114 18 98 % 1.753 m (5' 9\") 92.5 kg (204 lb)       Physical Exam    Constitutional: Alert, attentive, GCS 15  HENT:    Nose: Nose normal.    Mouth/Throat: Oropharynx is clear, mucous membranes are moist  Eyes: EOM are normal, anicteric, conjugate gaze  CV: regular rate and rhythm; no murmurs  Chest: Effort normal and breath sounds clear without wheezing or rales, symmetric bilaterally   GI:  non tender. No distension. No guarding or rebound.    MSK: No sign lower extremity swelling. Right leg tender to palpation on the lateral aspect of the anterior compartment which is soft. Greatest tenderness along inferior aspect of lateral malleolus. No joint erythema. 5/5 plantar and dorsal flexion.  Neurological: Alert, attentive, moving all extremities equally.   Skin: Skin is warm and dry.    Emergency Department Course     Imaging:  Radiographic findings were communicated with the patient and family who voiced understanding of the findings.  US Lower Extremity Venous Duplex Right  IMPRESSION: No DVT As read by radiology.  Ankle XR, G/E 3 Views, right   IMPRESSION: No acute fracture or dislocation As read by radiology.    Laboratory:  CBC: WBC 60.9 (H), HGB 8.4 (L),  (L) o/w WNL   BMP: Glucose 209 (H), Creatinine 1.36 (H), GFR 50 (L) o/w WNL     CRP inflammation: 6.7  Erythrocyte sedimentation rate: 29 (H)    Gram stain: Pending  Cell count with differential fluid: Pending  Crystal ID synovial fluid: Pending    Procedures:       Arthrocentesis     PERFORMED BY: Dr. Audie Bonilla.    SITE: Right ankle.    INDICATION:  Right ankle pain.    CONSENT: Obtained from the patient.     ANESTHESIA: Lidocaine 1% with epinephrine    NOTE:  Using sterile technique the site was prepped with above anesthetic. The joint was entered in lateral aspect and 0.5 cc's of initial yellow blood tinged fluid was withdrawn, increasingly bloody fluid with repositioning and sent for laboratory analysis, results above. "     COMPLICATIONS: Patient tolerated the procedure well with no immediate complications.    POST-PROCEDURE INSTRUCTIONS: The patient is asked to continue to rest the joint for a few more days before resuming regular activities.  It may be more painful for the first 1-2 days.  Watch for fever, or increased swelling or persistent pain in the joint.       Interventions:  Ibuprofen 600 MG: Ordered  Decadron 10 MG: Ordered    Emergency Department Course:  Past medical records, nursing notes, and vitals reviewed.  0419: I performed an exam of the patient and obtained history, as documented above.    IV inserted and blood drawn.    The patient was sent for a right lower extremity venous ultrasound and right ankle x-ray while in the emergency department, findings above.    0537: I performed an arthrocentesis of the patient's right ankle as documented above.    0618: I rechecked the patient. Explained findings to the patient and his wife.    Findings and plan explained to the Patient. Patient discharged home with instructions regarding supportive care, medications, and reasons to return. The importance of close follow-up was reviewed.     Impression & Plan      Medical Decision Makin-year-old male with past medical history significant for abdominal aortic aneurysm status post endograft repair with persistent leak, CLL not on therapy, history of inflammatory arthritis presenting for evaluation of sudden onset right lower extremity pain that awoke him from sleep.  Initially patient complained most of lateral lower leg pain however on exam, this seemed to be more focally located to the right ankle which was not erythematous or warm.  Vital signs notable for mild tachycardia that resolved without intervention.   history immediate concern was for possible DVT especially considering his CLL state however his ultrasound was negative for DVT with low suspicion for PE.  X-ray of the right ankle shows no evidence of fracture.   Bedside ultrasound did show moderate effusion however his labs were not suggestive of septic arthritis and I will suspicion for this well given his exam including a normal CRP, and a sed rate that is only mildly elevated in the setting of CLL 60.9 unchanged from prior studies.  I did perform arthrocentesis by a sterile technique however I was only able to obtain a small sample with significant amount of blood likely due to traumatic tap and does not likely represent hemarthrosis as initial fluid was clear and only Gram stain will be available.  In discussion with the patient he does report that this was similar in onset to his prior inflammatory arthritis which was located in his hip and he is undergone arthrocentesis and complete work-up with no concern for gout pseudogout or septic arthritis in the past.  He declined repeat arthrocentesis and instead favored a dose of ibuprofen which is worked in the past.  He is given a single dose of ibuprofen in the setting of known CKD and a dose of Decadron.  I recommended close follow-up with his primary care doctor and strict return precautions were reviewed including worsening pain, ankle redness or swelling or development of a fever.  Patient expressed understanding this plan and was discharged home.    Diagnosis:    ICD-10-CM   1. Right ankle effusion M25.471   2. Pain of right lower leg M79.661   3. History of inflammatory arthritis Z87.39       Disposition:  discharged to home    Audie Bonilla MD   [employer]  9:45 PM 05/29/19     Sandi Combs  5/29/2019   [facility] EMERGENCY DEPARTMENT  Scribe Disclosure:  ISandi, am serving as a scribe at 4:19 AM on 5/29/2019 to document services personally performed by Audie Bonilla MD based on my observations and the provider's statements to me.        Audie Bonilla MD  05/29/19 2144

## 2019-05-30 NOTE — RESULT ENCOUNTER NOTE
[place] ED discharge antibiotic (if prescribed):  None  No changes in treatment per General culture protocol.

## 2019-06-03 LAB
BACTERIA SPEC CULT: NO GROWTH
SPECIMEN SOURCE: NORMAL

## 2019-06-12 DIAGNOSIS — C91.10 CLL (CHRONIC LYMPHOCYTIC LEUKEMIA) (H): ICD-10-CM

## 2019-06-12 LAB
ANISOCYTOSIS BLD QL SMEAR: SLIGHT
BASOPHILS # BLD AUTO: 0.6 10E9/L (ref 0–0.2)
BASOPHILS NFR BLD AUTO: 1 %
DIFFERENTIAL METHOD BLD: ABNORMAL
EOSINOPHIL # BLD AUTO: 0.6 10E9/L (ref 0–0.7)
EOSINOPHIL NFR BLD AUTO: 1 %
ERYTHROCYTE [DISTWIDTH] IN BLOOD BY AUTOMATED COUNT: 16.9 % (ref 10–15)
HCT VFR BLD AUTO: 26 % (ref 40–53)
HGB BLD-MCNC: 8.1 G/DL (ref 13.3–17.7)
LYMPHOCYTES # BLD AUTO: 41.1 10E9/L (ref 0.8–5.3)
LYMPHOCYTES NFR BLD AUTO: 75 %
MACROCYTES BLD QL SMEAR: PRESENT
MCH RBC QN AUTO: 34.2 PG (ref 26.5–33)
MCHC RBC AUTO-ENTMCNC: 31.2 G/DL (ref 31.5–36.5)
MCV RBC AUTO: 110 FL (ref 78–100)
MICROCYTES BLD QL SMEAR: PRESENT
MONOCYTES # BLD AUTO: 1.6 10E9/L (ref 0–1.3)
MONOCYTES NFR BLD AUTO: 3 %
NEUTROPHILS # BLD AUTO: 11 10E9/L (ref 1.6–8.3)
NEUTROPHILS NFR BLD AUTO: 20 %
PLATELET # BLD AUTO: 91 10E9/L (ref 150–450)
PLATELET # BLD EST: ABNORMAL 10*3/UL
RBC # BLD AUTO: 2.37 10E12/L (ref 4.4–5.9)
WBC # BLD AUTO: 54.9 10E9/L (ref 4–11)

## 2019-06-12 PROCEDURE — 36415 COLL VENOUS BLD VENIPUNCTURE: CPT | Performed by: INTERNAL MEDICINE

## 2019-06-12 PROCEDURE — 85025 COMPLETE CBC W/AUTO DIFF WBC: CPT | Performed by: INTERNAL MEDICINE

## 2019-06-13 ENCOUNTER — ONCOLOGY VISIT (OUTPATIENT)
Dept: ONCOLOGY | Facility: CLINIC | Age: 79
End: 2019-06-13
Attending: INTERNAL MEDICINE
Payer: COMMERCIAL

## 2019-06-13 VITALS
DIASTOLIC BLOOD PRESSURE: 90 MMHG | BODY MASS INDEX: 31.22 KG/M2 | RESPIRATION RATE: 18 BRPM | HEART RATE: 96 BPM | TEMPERATURE: 97.6 F | SYSTOLIC BLOOD PRESSURE: 141 MMHG | HEIGHT: 69 IN | WEIGHT: 210.8 LBS | OXYGEN SATURATION: 98 %

## 2019-06-13 DIAGNOSIS — C91.10 CLL (CHRONIC LYMPHOCYTIC LEUKEMIA) (H): ICD-10-CM

## 2019-06-13 DIAGNOSIS — D64.9 ANEMIA, UNSPECIFIED TYPE: ICD-10-CM

## 2019-06-13 PROCEDURE — 99214 OFFICE O/P EST MOD 30 MIN: CPT | Performed by: INTERNAL MEDICINE

## 2019-06-13 PROCEDURE — G0463 HOSPITAL OUTPT CLINIC VISIT: HCPCS

## 2019-06-13 ASSESSMENT — MIFFLIN-ST. JEOR: SCORE: 1676.56

## 2019-06-13 NOTE — LETTER
6/13/2019         RE: Austin Garza  [address] [place] [address]        Dear Colleague,    Thank you for referring your patient, Austin Garza, to the [facility]. Please see a copy of my visit note below.    [facility]    Hematology/Oncology Established Patient Note      Today's Date: 6/13/19    Reason for Follow-up: CLL      HISTORY OF PRESENT ILLNESS: Austin Garza is a 78 year old male with PMHx of DMII, HTN who presented with leukocytosis.  In November 2017, he was found to have elevated WBC of 61.7K, hemoglobin of 10.4, and platelet of 115K.  There were no other CBC results available between 2010 and 2017.  Prior to 2010, he had normal CBC, with normal to mild anemia, and mild thrombocytopenia.   He was asymptomatic.    He underwent bone marrow biopsy on 11/21/17, which was consistent with chronic lymphocytic leukemia/small lymphocytic lymphoma, with 13% ringed sideroblasts identified.  The presence of increased numbers of ringed sideroblasts raises the possibility of an associated myelodysplastic syndrome.  Dysplastic changes are not identified though.  Many cases exhibiting ringed sideroblasts are metabolic origin, without significant risk of progression to acute leukemia, and these typically do not show dysplastic morphology as is the case with this specimen.  15% ringed sideroblasts are required for a diagnosis for RARS, which this specimen does not meet.  Flow cytometry is also consistent with CLL/SLL.  Cytogenetics show two (10%) of the metaphase cells comprise a clone characterized by a deletion within the proximal long arm of a chromosome 13 as the sole karyotypic abnormality.  Three (15%) metaphases had loss of the Y chromosome as the sole abnormality.    CT scan on 11/29/17 shows mildly enlarged left axillary lymph node and periaortic lymph nodes in the retroperitoneum of the abdomen.  Spleen is also enlarged.    On his  staging CT scan for CLL, he was incidentally found to have a large infrarenal abdominal aortic aneurysm, measuring 7.6 cm.  He was seen by Dr. Thomas, and he underwent EVAR on 12/4/17.       INTERIM HISTORY: Austin is here for follow-up today.  He says that he feels okay, but has been more fatigued and short of breath.  He underwent embolization procedure with IR on 5/13/19 and says that went well.        REVIEW OF SYSTEMS:   14 point ROS was reviewed and is negative other than as noted above in HPI.       HOME MEDICATIONS:  Current Outpatient Medications   Medication Sig Dispense Refill     aspirin (ASA) 81 MG tablet Take 1 tablet (81 mg) by mouth every evening 90 tablet 1     atorvastatin (LIPITOR) 20 MG tablet Take 1 tablet (20 mg) by mouth At Bedtime 90 tablet 2     clopidogrel (PLAVIX) 75 MG tablet Take 1 tablet (75 mg) by mouth daily 90 tablet 1     fluticasone (FLONASE) 50 MCG/ACT nasal spray Spray 2 sprays into both nostrils 2 times daily 1 Bottle 9     glimepiride (AMARYL) 1 MG tablet Take 1 tablet (1 mg) by mouth every morning (before breakfast) 90 tablet 0     losartan-hydrochlorothiazide (HYZAAR) 50-12.5 MG tablet Take 1 tablet by mouth daily 90 tablet 2     metFORMIN (GLUCOPHAGE) 1000 MG tablet Take 1 tablet (1,000 mg) by mouth 2 times daily (with meals) 180 tablet 0     Multiple Vitamins-Minerals (CENTRUM SILVER) per tablet Take 1 tablet by mouth daily           ALLERGIES:  Allergies   Allergen Reactions     Ace Inhibitors      cough         PAST MEDICAL HISTORY:  Past Medical History:   Diagnosis Date     AAA (abdominal aortic aneurysm)      BCC (basal cell carcinoma of skin) - face      CLL (chronic lymphocytic leukemia)      Diaphragmatic hernia      Diverticulitis of colon      Esophageal reflux      Essential hypertension      Hyperlipidemia      Irritable bowel syndrome      Macular degeneration (senile) of retina      Squamous Cell Carcinoma, Back 1988     Type 2 diabetes mellitus          PAST  SURGICAL HISTORY:  Past Surgical History:   Procedure Laterality Date     APPENDECTOMY OPEN  1966     BONE MARROW BIOPSY, BONE SPECIMEN, NEEDLE/TROCAR N/A 2017    Procedure: BIOPSY BONE MARROW;  BONE MARROW BIOPSY;  Surgeon: Shady Garcia MD;  Location:  GI     COLONOSCOPY       ENDOVASCULAR REPAIR ANEURYSM ABDOMINAL AORTA N/A 2017    Procedure: ENDOVASCULAR REPAIR ANEURYSM ABDOMINAL AORTA;  ENDOVASCULAR REPAIR ANEURYSM ABDOMINAL AORTA;  Surgeon: Milton Cazares MD;  Location:  OR     Fistulotomy with marsupialization for repair of fisture in ano       IR ABDOMINAL AORTOGRAM  3/11/2019     IR VISCERAL EMBOLIZATION  2019     Multiple skin tags - resection       Squamous cell skin cancer resection - back           SOCIAL HISTORY:  Social History     Socioeconomic History     Marital status:      Spouse name: librado     Number of children: 0     Years of education: 17     Highest education level: Not on file   Occupational History     Occupation: retired   Social Needs     Financial resource strain: Not on file     Food insecurity:     Worry: Not on file     Inability: Not on file     Transportation needs:     Medical: Not on file     Non-medical: Not on file   Tobacco Use     Smoking status: Former Smoker     Packs/day: 0.50     Years: 20.00     Pack years: 10.00     Last attempt to quit: 1975     Years since quittin.4     Smokeless tobacco: Former User   Substance and Sexual Activity     Alcohol use: Yes     Alcohol/week: 6.0 - 8.4 oz     Types: 10 - 14 Standard drinks or equivalent per week     Comment: 2 drinks a day/wine     Drug use: No     Sexual activity: Never   Lifestyle     Physical activity:     Days per week: Not on file     Minutes per session: Not on file     Stress: Not on file   Relationships     Social connections:     Talks on phone: Not on file     Gets together: Not on file     Attends [employer] service: Not on file     Active  "member of club or organization: Not on file     Attends meetings of clubs or organizations: Not on file     Relationship status: Not on file     Intimate partner violence:     Fear of current or ex partner: Not on file     Emotionally abused: Not on file     Physically abused: Not on file     Forced sexual activity: Not on file   Other Topics Concern     Parent/sibling w/ CABG, MI or angioplasty before 65F 55M? Not Asked   Social History Narrative     Not on file   He quit smoking in .  He drinks 1-3 glasses of wine a night with dinner.  He is retired, and used to work as a .  He lives with his wife in [place].  His cousin had lung cancer and  around age 69.  Otherwise, he denies family history of cancer.        FAMILY HISTORY:  Family History   Problem Relation Age of Onset     Cerebrovascular Disease Father         x 2     Hypertension Mother      C.A.D. Mother      Cancer Mother         skin     Eye Disorder Mother         glaucoma     Prostate Cancer No family hx of      Cancer - colorectal No family hx of      Glaucoma No family hx of      Macular Degeneration No family hx of          PHYSICAL EXAM:  Vital signs:  /90   Pulse 96   Temp 97.6  F (36.4  C) (Tympanic)   Resp 18   Ht 1.753 m (5' 9\")   Wt 95.6 kg (210 lb 12.8 oz)   SpO2 98%   BMI 31.13 kg/m      GENERAL/CONSTITUTIONAL: No acute distress.  EYES: No scleral icterus.  RESPIRATORY: Clear to auscultation bilaterally.  CARDIOVASCULAR: Regular rate and rhythm.    MUSCULOSKELETAL: Warm and well-perfused.  NEUROLOGIC: Alert, oriented, answers questions appropriately.  INTEGUMENTARY: No jaundice.      LABS:  CBC RESULTS:   Recent Labs   Lab Test 19  0826   WBC 54.9*   RBC 2.37*   HGB 8.1*   HCT 26.0*   *   MCH 34.2*   MCHC 31.2*   RDW 16.9*   PLT 91*           PATHOLOGY:  Bone marrow biopsy 17:  FINAL DIAGNOSIS:   Peripheral blood demonstrating macrocytic anemia with thrombocytopenia     Bone marrow trephine biopsy " and aspirate demonstrating hypercellular   bone marrow involved by chronic lymphocytic leukemia/small lymphocytic   lymphoma, 13% ringed sideroblasts identified (see comment)     COMMENT:   Immunophenotyping studies demonstrate a kappa monotypic CD5 positive   B-cell population most compatible with chronic lymphocytic leukemia.  An   immunoperoxidase stain for cyclin D1 is pending.  Dysplastic changes are   not identified within the erythroid series.  However, the presence of   increased numbers of ringed sideroblasts raises the possibility of an   associated myelodysplastic syndrome, particularly in light of the noted   increased mean red cell volume and associated thrombocytopenia.  The   thrombocytopenia may also reflect reduced numbers of megakaryocytes   given the involvement of marrow by chronic lymphocytic leukemia.  Many   cases exhibiting ringed sideroblasts are metabolic in origin, without   significant risk of progression to acute leukemia, and these typically   do not show dysplastic morphology as is the case with the current   specimen.  Moreover, 15% ringed sideroblasts are required for a   diagnosis of  RARS. Cytogenetic studies are pending.     Flow  INTERPRETATION:   Bone Marrow, Left:        CD5 positive Kappa monotypic B cells (82%)     COMMENT:   The clonal B-cell population present in this specimen has a similar   immunophenotype as reported previously in the blood from this patient   ([id number]).  The immunophenotypic findings are suggestive of marrow   involvement by small lymphocytic lymphoma/chronic lymphocytic leukemia   (SLL/CLL). Large cells may not survive specimen processing.  There may   be peripheral blood contamination. Final interpretation requires   correlation with results of other ancillary studies, morphologic and   clinical features.     ISCN:   46,XY,del(13)(q12q14)[2]/45,X,-Y[3]/46,XY[15].nuc   alton(ATMx2,TP53x1)[4/200],([id number],QFLG0c6)[21/200]     INTERPRETATION:   Two  (10%) of the metaphase cells analyzed comprised a clone   characterized by a deletion within the proximal long arm of a chromosome   13 as the sole karyotypic abnormality. Additionally, three (15%)   metaphases had loss of the Y chromosome as the sole abnormality.     These findings confirm and expand those of the previously reported FISH   analysis ([id number]) that showed 10.5% of interphase cells to have a   signal pattern indicative of loss of the [id number] locus.     Loss of the Y chromosome, as seen in this case, has been reported both   as an acquired clonal abnormality associated with hematologic (primarily   myeloid) disorders and also as a clinically insignificant finding   associated with the normal aging process in adult males. When presenting   as a clonal abnormality, loss of Y is typically seen in addition to   other cytogenetic abnormalities. This patient's age and the absence of   other chromosomal abnormalities suggest that the loss of Y represents an   age-related finding in this case.         ASSESSMENT/PLAN:  Austin Garza is a 78 year old male with:    1) CLL/SLL: BLACKWOOD stage III, with lymphocytosis, lymphadenopathy, splenomegaly, and anemia.  He has mild thrombocytopenia as well, but is above 100K.  He presented with leukocytosis with WBC of 61.7K, hemoglobin of 10.4, and platelet count of 115K on diagnosis.  Bone marrow biopsy and flow cytometry confirmed CLL/SLL.  CT scan shows mildly enlarged left axillary lymph node and periaortic lymph nodes in the retroperitoneum of the abdomen, with enlarged spleen.      CBC from yesterday reviewed with Austin.  WBC is 54.9K, around where his baseline levels have been.  Platelet count and hemoglobin remain in his baseline range, although hemoglobin is a bit lower at 8.1 today, and he is feeling more fatigued with more shortness of breath.      He has symptoms of fatigue and sweats around his neck at night, as well as fatigue.  Elevated lymphocyte count is  not necessarily an indication to start treatment.  However, I discussed that if these symptoms are thought to be related to the CLL and if his lymphocyte count continues to rise rapidly or if his cytopenias worsen, this may warrant starting treatment.  He says that he would be okay with that if it is needed.     Since WBC is fairly stable at his baseline, hemoglobin and platelet count are also stable, and his symptoms have not changed, will continue to monitor closely for now.    -with the hemoglobin being 8.1 and patient is more fatigued - will transfuse 1 unit of PRBC's.  Consent obtained today.  -CBC check 1 week after transfusion  -RTC in 3 months with CBC check    2) Abdominal aortic aneurysm: measures up to 7.6 cm on CT scan, incidentally found.  He underwent EVAR on 12/4/17.  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  He is on Plavix now.  On 5/13/19, he underwent and percutaneous aneurysm sac puncture and endo leak embolization by IR on 5/13/19.  -follow-up with vascular surgery and IR    3) Diabetes, hypertension:  -following with PCP      I spent a total of 25 minutes with the patient, with over >50% of the time in counseling and/or coordination of care.      Breana Perry MD  Hematology/Oncology  [facility]      Again, thank you for allowing me to participate in the care of your patient.        Sincerely,        Breana Perry MD

## 2019-06-13 NOTE — NURSING NOTE
"Oncology Rooming Note    June 13, 2019 3:45 PM   Austin Garza is a 78 year old male who presents for:    Chief Complaint   Patient presents with     Oncology Clinic Visit     CLL (chronic lymphocytic leukemia)     Initial Vitals: /90   Pulse 96   Temp 97.6  F (36.4  C) (Tympanic)   Resp 18   Ht 1.753 m (5' 9\")   Wt 95.6 kg (210 lb 12.8 oz)   SpO2 98%   BMI 31.13 kg/m   Estimated body mass index is 31.13 kg/m  as calculated from the following:    Height as of this encounter: 1.753 m (5' 9\").    Weight as of this encounter: 95.6 kg (210 lb 12.8 oz). Body surface area is 2.16 meters squared.  Data Unavailable Comment: Data Unavailable   No LMP for male patient.  Allergies reviewed: Yes  Medications reviewed: Yes    Medications: Medication refills not needed today.  Pharmacy name entered into Kan[facility]:    [facility] - [facility] [address]  [facility] - [place] - [address] [facility] - [place] - [address] AT [facility]  [id number] - [place], [place] - [address] AT [facility]    Clinical concerns: f/u       Geetha Benítze CMA              "

## 2019-06-13 NOTE — PROGRESS NOTES
[facility]    Hematology/Oncology Established Patient Note      Today's Date: 6/13/19    Reason for Follow-up: CLL      HISTORY OF PRESENT ILLNESS: Austin Garza is a 78 year old male with PMHx of DMII, HTN who presented with leukocytosis.  In November 2017, he was found to have elevated WBC of 61.7K, hemoglobin of 10.4, and platelet of 115K.  There were no other CBC results available between 2010 and 2017.  Prior to 2010, he had normal CBC, with normal to mild anemia, and mild thrombocytopenia.   He was asymptomatic.    He underwent bone marrow biopsy on 11/21/17, which was consistent with chronic lymphocytic leukemia/small lymphocytic lymphoma, with 13% ringed sideroblasts identified.  The presence of increased numbers of ringed sideroblasts raises the possibility of an associated myelodysplastic syndrome.  Dysplastic changes are not identified though.  Many cases exhibiting ringed sideroblasts are metabolic origin, without significant risk of progression to acute leukemia, and these typically do not show dysplastic morphology as is the case with this specimen.  15% ringed sideroblasts are required for a diagnosis for RARS, which this specimen does not meet.  Flow cytometry is also consistent with CLL/SLL.  Cytogenetics show two (10%) of the metaphase cells comprise a clone characterized by a deletion within the proximal long arm of a chromosome 13 as the sole karyotypic abnormality.  Three (15%) metaphases had loss of the Y chromosome as the sole abnormality.    CT scan on 11/29/17 shows mildly enlarged left axillary lymph node and periaortic lymph nodes in the retroperitoneum of the abdomen.  Spleen is also enlarged.    On his staging CT scan for CLL, he was incidentally found to have a large infrarenal abdominal aortic aneurysm, measuring 7.6 cm.  He was seen by Dr. Thomas, and he underwent EVAR on 12/4/17.       INTERIM HISTORY: Austin is here for follow-up today.  He says that he  feels okay, but has been more fatigued and short of breath.  He underwent embolization procedure with IR on 5/13/19 and says that went well.        REVIEW OF SYSTEMS:   14 point ROS was reviewed and is negative other than as noted above in HPI.       HOME MEDICATIONS:  Current Outpatient Medications   Medication Sig Dispense Refill     aspirin (ASA) 81 MG tablet Take 1 tablet (81 mg) by mouth every evening 90 tablet 1     atorvastatin (LIPITOR) 20 MG tablet Take 1 tablet (20 mg) by mouth At Bedtime 90 tablet 2     clopidogrel (PLAVIX) 75 MG tablet Take 1 tablet (75 mg) by mouth daily 90 tablet 1     fluticasone (FLONASE) 50 MCG/ACT nasal spray Spray 2 sprays into both nostrils 2 times daily 1 Bottle 9     glimepiride (AMARYL) 1 MG tablet Take 1 tablet (1 mg) by mouth every morning (before breakfast) 90 tablet 0     losartan-hydrochlorothiazide (HYZAAR) 50-12.5 MG tablet Take 1 tablet by mouth daily 90 tablet 2     metFORMIN (GLUCOPHAGE) 1000 MG tablet Take 1 tablet (1,000 mg) by mouth 2 times daily (with meals) 180 tablet 0     Multiple Vitamins-Minerals (CENTRUM SILVER) per tablet Take 1 tablet by mouth daily           ALLERGIES:  Allergies   Allergen Reactions     Ace Inhibitors      cough         PAST MEDICAL HISTORY:  Past Medical History:   Diagnosis Date     AAA (abdominal aortic aneurysm)      BCC (basal cell carcinoma of skin) - face      CLL (chronic lymphocytic leukemia)      Diaphragmatic hernia      Diverticulitis of colon      Esophageal reflux      Essential hypertension      Hyperlipidemia      Irritable bowel syndrome      Macular degeneration (senile) of retina      Squamous Cell Carcinoma, Back 1988     Type 2 diabetes mellitus          PAST SURGICAL HISTORY:  Past Surgical History:   Procedure Laterality Date     APPENDECTOMY OPEN  1966     BONE MARROW BIOPSY, BONE SPECIMEN, NEEDLE/TROCAR N/A 11/21/2017    Procedure: BIOPSY BONE MARROW;  BONE MARROW BIOPSY;  Surgeon: Shady Garcia MD;   Location:  GI     COLONOSCOPY       ENDOVASCULAR REPAIR ANEURYSM ABDOMINAL AORTA N/A 2017    Procedure: ENDOVASCULAR REPAIR ANEURYSM ABDOMINAL AORTA;  ENDOVASCULAR REPAIR ANEURYSM ABDOMINAL AORTA;  Surgeon: Milton Cazares MD;  Location: [facility] OR     Fistulotomy with marsupialization for repair of fisture in ano       IR ABDOMINAL AORTOGRAM  3/11/2019     IR VISCERAL EMBOLIZATION  2019     Multiple skin tags - resection  1998     Squamous cell skin cancer resection - back           SOCIAL HISTORY:  Social History     Socioeconomic History     Marital status:      Spouse name: librado     Number of children: 0     Years of education: 17     Highest education level: Not on file   Occupational History     Occupation: retired   Social Needs     Financial resource strain: Not on file     Food insecurity:     Worry: Not on file     Inability: Not on file     Transportation needs:     Medical: Not on file     Non-medical: Not on file   Tobacco Use     Smoking status: Former Smoker     Packs/day: 0.50     Years: 20.00     Pack years: 10.00     Last attempt to quit: 1975     Years since quittin.4     Smokeless tobacco: Former User   Substance and Sexual Activity     Alcohol use: Yes     Alcohol/week: 6.0 - 8.4 oz     Types: 10 - 14 Standard drinks or equivalent per week     Comment: 2 drinks a day/wine     Drug use: No     Sexual activity: Never   Lifestyle     Physical activity:     Days per week: Not on file     Minutes per session: Not on file     Stress: Not on file   Relationships     Social connections:     Talks on phone: Not on file     Gets together: Not on file     Attends Anabaptist service: Not on file     Active member of club or organization: Not on file     Attends meetings of clubs or organizations: Not on file     Relationship status: Not on file     Intimate partner violence:     Fear of current or ex partner: Not on file     Emotionally abused: Not on file      "Physically abused: Not on file     Forced sexual activity: Not on file   Other Topics Concern     Parent/sibling w/ CABG, MI or angioplasty before 65F 55M? Not Asked   Social History Narrative     Not on file   He quit smoking in .  He drinks 1-3 glasses of wine a night with dinner.  He is retired, and used to work as a .  He lives with his wife in [place].  His cousin had lung cancer and  around age 69.  Otherwise, he denies family history of cancer.        FAMILY HISTORY:  Family History   Problem Relation Age of Onset     Cerebrovascular Disease Father         x 2     Hypertension Mother      C.A.D. Mother      Cancer Mother         skin     Eye Disorder Mother         glaucoma     Prostate Cancer No family hx of      Cancer - colorectal No family hx of      Glaucoma No family hx of      Macular Degeneration No family hx of          PHYSICAL EXAM:  Vital signs:  /90   Pulse 96   Temp 97.6  F (36.4  C) (Tympanic)   Resp 18   Ht 1.753 m (5' 9\")   Wt 95.6 kg (210 lb 12.8 oz)   SpO2 98%   BMI 31.13 kg/m     GENERAL/CONSTITUTIONAL: No acute distress.  EYES: No scleral icterus.  RESPIRATORY: Clear to auscultation bilaterally.  CARDIOVASCULAR: Regular rate and rhythm.    MUSCULOSKELETAL: Warm and well-perfused.  NEUROLOGIC: Alert, oriented, answers questions appropriately.  INTEGUMENTARY: No jaundice.      LABS:  CBC RESULTS:   Recent Labs   Lab Test 19  0826   WBC 54.9*   RBC 2.37*   HGB 8.1*   HCT 26.0*   *   MCH 34.2*   MCHC 31.2*   RDW 16.9*   PLT 91*           PATHOLOGY:  Bone marrow biopsy 17:  FINAL DIAGNOSIS:   Peripheral blood demonstrating macrocytic anemia with thrombocytopenia     Bone marrow trephine biopsy and aspirate demonstrating hypercellular   bone marrow involved by chronic lymphocytic leukemia/small lymphocytic   lymphoma, 13% ringed sideroblasts identified (see comment)     COMMENT:   Immunophenotyping studies demonstrate a kappa monotypic CD5 positive "   B-cell population most compatible with chronic lymphocytic leukemia.  An   immunoperoxidase stain for cyclin D1 is pending.  Dysplastic changes are   not identified within the erythroid series.  However, the presence of   increased numbers of ringed sideroblasts raises the possibility of an   associated myelodysplastic syndrome, particularly in light of the noted   increased mean red cell volume and associated thrombocytopenia.  The   thrombocytopenia may also reflect reduced numbers of megakaryocytes   given the involvement of marrow by chronic lymphocytic leukemia.  Many   cases exhibiting ringed sideroblasts are metabolic in origin, without   significant risk of progression to acute leukemia, and these typically   do not show dysplastic morphology as is the case with the current   specimen.  Moreover, 15% ringed sideroblasts are required for a   diagnosis of  RARS. Cytogenetic studies are pending.     Flow  INTERPRETATION:   Bone Marrow, Left:        CD5 positive Kappa monotypic B cells (82%)     COMMENT:   The clonal B-cell population present in this specimen has a similar   immunophenotype as reported previously in the blood from this patient   ([id number]).  The immunophenotypic findings are suggestive of marrow   involvement by small lymphocytic lymphoma/chronic lymphocytic leukemia   (SLL/CLL). Large cells may not survive specimen processing.  There may   be peripheral blood contamination. Final interpretation requires   correlation with results of other ancillary studies, morphologic and   clinical features.     ISCN:   46,XY,del(13)(q12q14)[2]/45,X,-Y[3]/46,XY[15].nuc   alton(ATMx2,TP53x1)[4/200],([id number],HKBZ7z1)[21/200]     INTERPRETATION:   Two (10%) of the metaphase cells analyzed comprised a clone   characterized by a deletion within the proximal long arm of a chromosome   13 as the sole karyotypic abnormality. Additionally, three (15%)   metaphases had loss of the Y chromosome as the sole  abnormality.     These findings confirm and expand those of the previously reported FISH   analysis ([id number]) that showed 10.5% of interphase cells to have a   signal pattern indicative of loss of the [id number] locus.     Loss of the Y chromosome, as seen in this case, has been reported both   as an acquired clonal abnormality associated with hematologic (primarily   myeloid) disorders and also as a clinically insignificant finding   associated with the normal aging process in adult males. When presenting   as a clonal abnormality, loss of Y is typically seen in addition to   other cytogenetic abnormalities. This patient's age and the absence of   other chromosomal abnormalities suggest that the loss of Y represents an   age-related finding in this case.         ASSESSMENT/PLAN:  Austin Garza is a 78 year old male with:    1) CLL/SLL: BLACKWOOD stage III, with lymphocytosis, lymphadenopathy, splenomegaly, and anemia.  He has mild thrombocytopenia as well, but is above 100K.  He presented with leukocytosis with WBC of 61.7K, hemoglobin of 10.4, and platelet count of 115K on diagnosis.  Bone marrow biopsy and flow cytometry confirmed CLL/SLL.  CT scan shows mildly enlarged left axillary lymph node and periaortic lymph nodes in the retroperitoneum of the abdomen, with enlarged spleen.      CBC from yesterday reviewed with Austin.  WBC is 54.9K, around where his baseline levels have been.  Platelet count and hemoglobin remain in his baseline range, although hemoglobin is a bit lower at 8.1 today, and he is feeling more fatigued with more shortness of breath.      He has symptoms of fatigue and sweats around his neck at night, as well as fatigue.  Elevated lymphocyte count is not necessarily an indication to start treatment.  However, I discussed that if these symptoms are thought to be related to the CLL and if his lymphocyte count continues to rise rapidly or if his cytopenias worsen, this may warrant starting treatment.   He says that he would be okay with that if it is needed.     Since WBC is fairly stable at his baseline, hemoglobin and platelet count are also stable, and his symptoms have not changed, will continue to monitor closely for now.    -with the hemoglobin being 8.1 and patient is more fatigued - will transfuse 1 unit of PRBC's.  Consent obtained today.  -CBC check 1 week after transfusion  -RTC in 3 months with CBC check    2) Abdominal aortic aneurysm: measures up to 7.6 cm on CT scan, incidentally found.  He underwent EVAR on 12/4/17.  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  He is on Plavix now.  On 5/13/19, he underwent and percutaneous aneurysm sac puncture and endo leak embolization by IR on 5/13/19.  -follow-up with vascular surgery and IR    3) Diabetes, hypertension:  -following with PCP      I spent a total of 25 minutes with the patient, with over >50% of the time in counseling and/or coordination of care.      Breana Perry MD  Hematology/Oncology  [facility]

## 2019-06-15 ENCOUNTER — TELEPHONE (OUTPATIENT)
Dept: PEDIATRICS | Facility: CLINIC | Age: 79
End: 2019-06-15

## 2019-06-15 DIAGNOSIS — I10 HYPERTENSION GOAL BP (BLOOD PRESSURE) < 140/90: ICD-10-CM

## 2019-06-15 NOTE — TELEPHONE ENCOUNTER
"Requested Prescriptions   Pending Prescriptions Disp Refills     hydrochlorothiazide (HYDRODIURIL) 12.5 MG tablet [Pharmacy Med Name:   HYDROCHLOROTHIAZIDE 12.5 MG TB]  Discontinued  Last Written Prescription Date:  04/22/2019  Last Fill Quantity: 30 tablet,  # refills: 0    Last Office Visit: 5/1/2019 Jey Casillas MD       Future Office Visit:    Next 5 appointments (look out 90 days)    Jul 08, 2019  3:30 PM CDT  Return Visit with  ONCOLOGY NURSE  [facility]  [address]  [facility] [address]  [phone]   Sep 12, 2019  3:45 PM CDT  Return Visit with Breana Perry MD  [facility]  [address]  [facility] [address]  [phone]          30 tablet 0     Sig: TAKE 2 TABLETS (25 MG) BY MOUTH DAILY       Diuretics (Including Combos) Protocol Failed - 6/15/2019  1:46 PM        Failed - Blood pressure under 140/90 in past 12 months     BP Readings from Last 3 Encounters:   06/13/19 141/90   05/29/19 134/84   05/13/19 156/89                 Failed - Medication is active on med list        Failed - Normal serum creatinine on file in past 12 months     Recent Labs   Lab Test 05/29/19  0441   CR 1.36*              Passed - Recent (12 mo) or future (30 days) visit within the authorizing provider's specialty     Patient had office visit in the last 12 months or has a visit in the next 30 days with authorizing provider or within the authorizing provider's specialty.  See \"Patient Info\" tab in inbasket, or \"Choose Columns\" in Meds & Orders section of the refill encounter.              Passed - Patient is age 18 or older        Passed - Normal serum potassium on file in past 12 months     Recent Labs   Lab Test 05/29/19  0441   POTASSIUM 4.9                    Passed - Normal serum sodium on file in past 12 " months     Recent Labs   Lab Test 05/29/19  0441                 Pharmacy is also Requesting Losartan Potassium 50 Mg Tablet with 90 day supply. ( not on med list.)

## 2019-06-17 NOTE — TELEPHONE ENCOUNTER
Routing refill request to provider for review/approval because:  Labs out of range:  BP and Creatinine  Paula WIN RN - Triage  [facility]

## 2019-06-18 ENCOUNTER — PATIENT OUTREACH (OUTPATIENT)
Dept: ONCOLOGY | Facility: CLINIC | Age: 79
End: 2019-06-18

## 2019-06-18 ENCOUNTER — HOSPITAL ENCOUNTER (OUTPATIENT)
Facility: CLINIC | Age: 79
Setting detail: SPECIMEN
Discharge: HOME | End: 2019-06-18
Attending: INTERNAL MEDICINE | Admitting: INTERNAL MEDICINE
Payer: COMMERCIAL

## 2019-06-18 ENCOUNTER — ONCOLOGY VISIT (OUTPATIENT)
Dept: ONCOLOGY | Facility: CLINIC | Age: 79
End: 2019-06-18
Attending: INTERNAL MEDICINE
Payer: COMMERCIAL

## 2019-06-18 DIAGNOSIS — D64.9 ANEMIA, UNSPECIFIED TYPE: Primary | ICD-10-CM

## 2019-06-18 DIAGNOSIS — D64.9 ANEMIA, UNSPECIFIED TYPE: ICD-10-CM

## 2019-06-18 LAB
ABO + RH BLD: NORMAL
ABO + RH BLD: NORMAL
BLD GP AB SCN SERPL QL: NORMAL
BLD PROD TYP BPU: NORMAL
BLOOD BANK CMNT PATIENT-IMP: NORMAL
NUM BPU REQUESTED: 1
SPECIMEN EXP DATE BLD: NORMAL

## 2019-06-18 PROCEDURE — 36415 COLL VENOUS BLD VENIPUNCTURE: CPT

## 2019-06-18 PROCEDURE — 86850 RBC ANTIBODY SCREEN: CPT | Performed by: INTERNAL MEDICINE

## 2019-06-18 PROCEDURE — 86901 BLOOD TYPING SEROLOGIC RH(D): CPT | Performed by: INTERNAL MEDICINE

## 2019-06-18 PROCEDURE — 86900 BLOOD TYPING SEROLOGIC ABO: CPT | Performed by: INTERNAL MEDICINE

## 2019-06-18 PROCEDURE — 86923 COMPATIBILITY TEST ELECTRIC: CPT | Performed by: INTERNAL MEDICINE

## 2019-06-18 NOTE — PROGRESS NOTES
Medical Assistant Note:  Austin CHEEMA Greg presents today for blood draw.    Patient seen by provider today: No.   present during visit today: Not Applicable.    Concerns: No Concerns.    Procedure:  Lab draw site: left antecub, Needle type: butterfly, Gauge: 23.    Post Assessment:  Labs drawn without difficulty: Yes.    Discharge Plan:  Departure Mode: Ambulatory.    Face to Face Time: 10.    Geetha Benítez, CMA

## 2019-06-18 NOTE — TELEPHONE ENCOUNTER
Please call patient. Is he taking losartan-hydrochlorothiazide 50-12.5 as in his medication list, or losartan and hydrochlorothiazide separately? If we can confirm medication combination that patient is taking would be fine to refill.    Sandi Eastman MD  Internal Medicine-Pediatrics

## 2019-06-18 NOTE — LETTER
6/18/2019         RE: Austin Garza  [address] [place] [address]        Dear Colleague,    Thank you for referring your patient, Austin Garza, to the [facility]ER [facility]. Please see a copy of my visit note below.    Medical Assistant Note:  Austin Garza presents today for blood draw.    Patient seen by provider today: No.   present during visit today: Not Applicable.    Concerns: No Concerns.    Procedure:  Lab draw site: left antecub, Needle type: butterfly, Gauge: 23.    Post Assessment:  Labs drawn without difficulty: Yes.    Discharge Plan:  Departure Mode: Ambulatory.    Face to Face Time: 10.    Geetha Benítez CMA              Again, thank you for allowing me to participate in the care of your patient.        Sincerely,        [facility] Oncology Nurse

## 2019-06-18 NOTE — PROGRESS NOTES
Pt called as he is scheduled for blood transfusion on 6/25/19 but able to now schedule him on 6/19/19 at 10:30 am.  Pt states he will be able to make appt at that time and also scheduled for type and screen today at 3 pm.  Dr Perry also ordered repeat cbc 1 week post transfusion and pt states he will schedule when in the clinic tomorrow when he has his schedule.  Pt verbalized understanding of plan.    Shirin Marin, RN, BSN, OCN

## 2019-06-19 ENCOUNTER — INFUSION THERAPY VISIT (OUTPATIENT)
Dept: INFUSION THERAPY | Facility: CLINIC | Age: 79
End: 2019-06-19
Attending: INTERNAL MEDICINE
Payer: COMMERCIAL

## 2019-06-19 VITALS
OXYGEN SATURATION: 98 % | HEART RATE: 85 BPM | DIASTOLIC BLOOD PRESSURE: 84 MMHG | TEMPERATURE: 97.9 F | RESPIRATION RATE: 16 BRPM | SYSTOLIC BLOOD PRESSURE: 154 MMHG

## 2019-06-19 DIAGNOSIS — C91.10 CLL (CHRONIC LYMPHOCYTIC LEUKEMIA) (H): ICD-10-CM

## 2019-06-19 DIAGNOSIS — D64.9 ANEMIA, UNSPECIFIED TYPE: Primary | ICD-10-CM

## 2019-06-19 LAB
BLD PROD TYP BPU: NORMAL
BLD UNIT ID BPU: 0
BLOOD PRODUCT CODE: NORMAL
BPU ID: NORMAL
TRANSFUSION STATUS PATIENT QL: NORMAL
TRANSFUSION STATUS PATIENT QL: NORMAL

## 2019-06-19 PROCEDURE — P9016 RBC LEUKOCYTES REDUCED: HCPCS

## 2019-06-19 PROCEDURE — 36430 TRANSFUSION BLD/BLD COMPNT: CPT

## 2019-06-19 RX ORDER — LOSARTAN POTASSIUM AND HYDROCHLOROTHIAZIDE 12.5; 5 MG/1; MG/1
1 TABLET ORAL DAILY
Qty: 90 TABLET | Refills: 0 | Status: SHIPPED | OUTPATIENT
Start: 2019-06-19 | End: 2019-09-12

## 2019-06-19 RX ORDER — HYDROCHLOROTHIAZIDE 12.5 MG/1
25 TABLET ORAL DAILY
Qty: 30 TABLET | Refills: 0 | OUTPATIENT
Start: 2019-06-19

## 2019-06-19 NOTE — PROGRESS NOTES
Infusion Nursing Note:  Austin Garza presents today for 1 unit PRBC.    Patient seen by provider today: No   present during visit today: Not Applicable.    Note: First time getting transfusion.  Reviewed potential side effects/risk factors.  Consent signed.     Intravenous Access:  Peripheral IV placed.    Treatment Conditions:  Blood transfusion consent signed 6/19/19.  HGB 8.1.      Post Infusion Assessment:  Patient tolerated infusion without incident.  Blood return noted pre and post infusion.  Site patent and intact, free from redness, edema or discomfort.  No evidence of extravasations.  Access discontinued per protocol.       Discharge Plan:   Discharge instructions reviewed with: Patient.  Patient and/or family verbalized understanding of discharge instructions and all questions answered.  Patient discharged in stable condition accompanied by: self.  Departure Mode: Ambulatory.    Nicci Saavedra RN

## 2019-06-19 NOTE — TELEPHONE ENCOUNTER
Call to patient: patient has been getting 2 separate pills with last 2 refills.  He would rather get it refilled as one pill and if the pharmacy does not have it, they will dispense 2 separate pills as they have done in the last 6 months.  Per below, ok to fill per provider.    Prescription approved per [facility] Refill Protocol.  Marielena Gutiérrez RN  Message handled by Nurse Triage.

## 2019-06-21 ENCOUNTER — PATIENT OUTREACH (OUTPATIENT)
Dept: ONCOLOGY | Facility: CLINIC | Age: 79
End: 2019-06-21

## 2019-06-21 ENCOUNTER — HOSPITAL ENCOUNTER (OUTPATIENT)
Facility: CLINIC | Age: 79
Setting detail: SPECIMEN
Discharge: HOME | End: 2019-06-21
Attending: INTERNAL MEDICINE | Admitting: INTERNAL MEDICINE
Payer: COMMERCIAL

## 2019-06-21 ENCOUNTER — ONCOLOGY VISIT (OUTPATIENT)
Dept: ONCOLOGY | Facility: CLINIC | Age: 79
End: 2019-06-21
Attending: INTERNAL MEDICINE
Payer: COMMERCIAL

## 2019-06-21 DIAGNOSIS — D72.829 LEUKOCYTOSIS, UNSPECIFIED TYPE: Primary | ICD-10-CM

## 2019-06-21 DIAGNOSIS — C91.10 CLL (CHRONIC LYMPHOCYTIC LEUKEMIA) (H): ICD-10-CM

## 2019-06-21 LAB
ANISOCYTOSIS BLD QL SMEAR: ABNORMAL
BASOPHILS # BLD AUTO: 0 10E9/L (ref 0–0.2)
BASOPHILS NFR BLD AUTO: 0 %
DACRYOCYTES BLD QL SMEAR: SLIGHT
DIFFERENTIAL METHOD BLD: ABNORMAL
ELLIPTOCYTES BLD QL SMEAR: SLIGHT
EOSINOPHIL # BLD AUTO: 0 10E9/L (ref 0–0.7)
EOSINOPHIL NFR BLD AUTO: 0 %
ERYTHROCYTE [DISTWIDTH] IN BLOOD BY AUTOMATED COUNT: 19.1 % (ref 10–15)
HCT VFR BLD AUTO: 31.6 % (ref 40–53)
HGB BLD-MCNC: 10 G/DL (ref 13.3–17.7)
LYMPHOCYTES # BLD AUTO: 52.7 10E9/L (ref 0.8–5.3)
LYMPHOCYTES NFR BLD AUTO: 91 %
MACROCYTES BLD QL SMEAR: PRESENT
MCH RBC QN AUTO: 33.3 PG (ref 26.5–33)
MCHC RBC AUTO-ENTMCNC: 31.6 G/DL (ref 31.5–36.5)
MCV RBC AUTO: 105 FL (ref 78–100)
MONOCYTES # BLD AUTO: 1.7 10E9/L (ref 0–1.3)
MONOCYTES NFR BLD AUTO: 3 %
NEUTROPHILS # BLD AUTO: 3.5 10E9/L (ref 1.6–8.3)
NEUTROPHILS NFR BLD AUTO: 6 %
OVALOCYTES BLD QL SMEAR: SLIGHT
PLATELET # BLD AUTO: 93 10E9/L (ref 150–450)
PLATELET # BLD EST: ABNORMAL 10*3/UL
POIKILOCYTOSIS BLD QL SMEAR: SLIGHT
RBC # BLD AUTO: 3 10E12/L (ref 4.4–5.9)
RBC INCLUSIONS BLD: SLIGHT
WBC # BLD AUTO: 57.9 10E9/L (ref 4–11)

## 2019-06-21 PROCEDURE — 36415 COLL VENOUS BLD VENIPUNCTURE: CPT

## 2019-06-21 PROCEDURE — 85025 COMPLETE CBC W/AUTO DIFF WBC: CPT | Performed by: INTERNAL MEDICINE

## 2019-06-21 NOTE — NURSING NOTE
Medical Assistant Note:  Austin ANETTE Garza presents today for blood draw.    Patient seen by provider today: No.   present during visit today: Not Applicable.    Concerns: No Concerns.    Procedure:  Lab draw site: left arm, Needle type: butterfly, Gauge: 21.    Post Assessment:  Labs drawn without difficulty: Yes.    Discharge Plan:  Departure Mode: Ambulatory.    Face to Face Time: 10.    Kimberley Luna CMA

## 2019-06-21 NOTE — PROGRESS NOTES
Received call from [facility] lab with critical lab WBC 57.9, rest of cbc pending.  Message sent to Dr Perry.    Shirin Marin, RN, BSN, OCN

## 2019-06-21 NOTE — PROGRESS NOTES
Dr Perry notified with results and aware.  WBC at his baseline and hgb improved post blood transfusion.  Follow up with Dr Perry in 3 months as scheduled.  Pt called and left message to call back to review.  Pt also has lab on 7/9 but can cancel as labs drawn today.    Shirin Marin RN, BSN, OCN

## 2019-06-21 NOTE — LETTER
6/21/2019         RE: Austin Garza  [address] [place] [address]        Dear Colleague,    Thank you for referring your patient, Austin Garza, to the [facility]CER CARE. Please see a copy of my visit note below.    See nursing note.    Kimberley Luna, JERI on 6/21/2019 at 10:59 AM      Again, thank you for allowing me to participate in the care of your patient.        Sincerely,        [facility] Nurse

## 2019-07-09 ENCOUNTER — HOSPITAL ENCOUNTER (OUTPATIENT)
Facility: CLINIC | Age: 79
Setting detail: SPECIMEN
Discharge: HOME | End: 2019-07-09
Attending: INTERNAL MEDICINE | Admitting: INTERNAL MEDICINE
Payer: COMMERCIAL

## 2019-07-09 ENCOUNTER — ONCOLOGY VISIT (OUTPATIENT)
Dept: ONCOLOGY | Facility: CLINIC | Age: 79
End: 2019-07-09
Attending: INTERNAL MEDICINE
Payer: COMMERCIAL

## 2019-07-09 DIAGNOSIS — C91.10 CLL (CHRONIC LYMPHOCYTIC LEUKEMIA) (H): ICD-10-CM

## 2019-07-09 LAB
ANISOCYTOSIS BLD QL SMEAR: ABNORMAL
BASOPHILS # BLD AUTO: 0 10E9/L (ref 0–0.2)
BASOPHILS NFR BLD AUTO: 0 %
DACRYOCYTES BLD QL SMEAR: SLIGHT
DIFFERENTIAL METHOD BLD: ABNORMAL
ELLIPTOCYTES BLD QL SMEAR: SLIGHT
EOSINOPHIL # BLD AUTO: 0 10E9/L (ref 0–0.7)
EOSINOPHIL NFR BLD AUTO: 0 %
ERYTHROCYTE [DISTWIDTH] IN BLOOD BY AUTOMATED COUNT: 19.3 % (ref 10–15)
HCT VFR BLD AUTO: 32.1 % (ref 40–53)
HGB BLD-MCNC: 10.1 G/DL (ref 13.3–17.7)
LYMPHOCYTES # BLD AUTO: 64.6 10E9/L (ref 0.8–5.3)
LYMPHOCYTES NFR BLD AUTO: 86 %
MACROCYTES BLD QL SMEAR: PRESENT
MCH RBC QN AUTO: 33.2 PG (ref 26.5–33)
MCHC RBC AUTO-ENTMCNC: 31.5 G/DL (ref 31.5–36.5)
MCV RBC AUTO: 106 FL (ref 78–100)
MICROCYTES BLD QL SMEAR: PRESENT
MONOCYTES # BLD AUTO: 6 10E9/L (ref 0–1.3)
MONOCYTES NFR BLD AUTO: 8 %
NEUTROPHILS # BLD AUTO: 4.5 10E9/L (ref 1.6–8.3)
NEUTROPHILS NFR BLD AUTO: 6 %
OVALOCYTES BLD QL SMEAR: SLIGHT
PLATELET # BLD AUTO: 105 10E9/L (ref 150–450)
PLATELET # BLD EST: ABNORMAL 10*3/UL
POIKILOCYTOSIS BLD QL SMEAR: SLIGHT
POLYCHROMASIA BLD QL SMEAR: SLIGHT
RBC # BLD AUTO: 3.04 10E12/L (ref 4.4–5.9)
WBC # BLD AUTO: 75.1 10E9/L (ref 4–11)

## 2019-07-09 PROCEDURE — 36415 COLL VENOUS BLD VENIPUNCTURE: CPT

## 2019-07-09 PROCEDURE — 85025 COMPLETE CBC W/AUTO DIFF WBC: CPT | Performed by: INTERNAL MEDICINE

## 2019-07-09 NOTE — PROGRESS NOTES
No return call back from pt as previously noted.  Pt had cbc today and received call from [facility] lab with critical WBC 75.1.  Dr Perry notified and received order to repeat cbc/diff in one month to monitor the trend.  Called pt and left message to call back to discuss results and check status.  Will also need to schedule repeat lab as noted.  Shirin Marin RN, BSN, OCN

## 2019-07-09 NOTE — NURSING NOTE
Medical Assistant Note:  Austin ANETTE Garza presents today for blood draw.    Patient seen by provider today: No.   present during visit today: Not Applicable.    Concerns: No Concerns.    Procedure:  Lab draw site: right arm, Needle type: butterfly, Gauge: 23.    Post Assessment:  Labs drawn without difficulty: Yes.    Discharge Plan:  Departure Mode: Ambulatory.    Face to Face Time: 10.    Kimberley Luna CMA

## 2019-07-09 NOTE — LETTER
7/9/2019         RE: Austin Garza  [address] [place] [address]        Dear Colleague,    Thank you for referring your patient, Austin Garza, to the [facility] CANCER CARE. Please see a copy of my visit note below.    See nursing note.    Kimberley Luna, JERI on 7/9/2019 at 8:33 AM      Again, thank you for allowing me to participate in the care of your patient.        Sincerely,        [facility] Nurse

## 2019-07-10 ENCOUNTER — TRANSFERRED RECORDS (OUTPATIENT)
Dept: HEALTH INFORMATION MANAGEMENT | Facility: CLINIC | Age: 79
End: 2019-07-10

## 2019-07-10 NOTE — PROGRESS NOTES
Return call from pt and pt informed with elevated WBC as noted below.  Pt states it has been that high in the past and then it comes down.  Pt denies fevers or any new symptoms at this time.  Informed that Dr Perry would like to repeat cbc/diff in one month although pt states his wife is going through some medical issues at this time and he would like to call back in the next week when he has his schedule to arrange. Support given to pt and informed him that writer can also reach out to him later in the week to schedule and pt agrees to plan.  Pt will call back sooner with any new symptoms or concerns.    Shirin Marin, RN, BSN, OCN

## 2019-07-25 ENCOUNTER — PATIENT OUTREACH (OUTPATIENT)
Dept: ONCOLOGY | Facility: CLINIC | Age: 79
End: 2019-07-25

## 2019-07-25 ENCOUNTER — TRANSFERRED RECORDS (OUTPATIENT)
Dept: HEALTH INFORMATION MANAGEMENT | Facility: CLINIC | Age: 79
End: 2019-07-25

## 2019-07-25 NOTE — PROGRESS NOTES
Received call from pt's wife who states pt was seen by Dr Antoine Mckeon at [facility] for Moh's procedure today.  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  Writer will contact Dr Mckeon for notes and pathology ([phone]).  Will contact pt's wife in am with further recommendations after discussing with Dr Perry and pt aware.    Dr Perry, will you need to see pt in the clinic to discuss further?    Shirin Marin, RN, BSN, OCN

## 2019-07-29 ENCOUNTER — TELEPHONE (OUTPATIENT)
Dept: ONCOLOGY | Facility: CLINIC | Age: 79
End: 2019-07-29

## 2019-07-30 NOTE — PROGRESS NOTES
Update in phone note on 7/29/19.  Pt scheduled with Dr Perry on 8/6/19.    Shirin Marin, RN, BSN, OCN

## 2019-08-01 ENCOUNTER — TRANSFERRED RECORDS (OUTPATIENT)
Dept: HEALTH INFORMATION MANAGEMENT | Facility: CLINIC | Age: 79
End: 2019-08-01

## 2019-08-05 ENCOUNTER — PATIENT OUTREACH (OUTPATIENT)
Dept: ONCOLOGY | Facility: CLINIC | Age: 79
End: 2019-08-05

## 2019-08-05 DIAGNOSIS — D72.829 LEUKOCYTOSIS, UNSPECIFIED TYPE: ICD-10-CM

## 2019-08-05 LAB
ANISOCYTOSIS BLD QL SMEAR: SLIGHT
DACRYOCYTES BLD QL SMEAR: SLIGHT
DIFFERENTIAL METHOD BLD: ABNORMAL
ERYTHROCYTE [DISTWIDTH] IN BLOOD BY AUTOMATED COUNT: 19.1 % (ref 10–15)
HCT VFR BLD AUTO: 29.3 % (ref 40–53)
HGB BLD-MCNC: 9.6 G/DL (ref 13.3–17.7)
HYPOCHROMIA BLD QL: PRESENT
LYMPHOCYTES # BLD AUTO: 54.5 10E9/L (ref 0.8–5.3)
LYMPHOCYTES NFR BLD AUTO: 80 %
MCH RBC QN AUTO: 33.9 PG (ref 26.5–33)
MCHC RBC AUTO-ENTMCNC: 32.8 G/DL (ref 31.5–36.5)
MCV RBC AUTO: 104 FL (ref 78–100)
MONOCYTES # BLD AUTO: 3.4 10E9/L (ref 0–1.3)
MONOCYTES NFR BLD AUTO: 5 %
NEUTROPHILS # BLD AUTO: 10.2 10E9/L (ref 1.6–8.3)
NEUTROPHILS NFR BLD AUTO: 15 %
PLATELET # BLD AUTO: 99 10E9/L (ref 150–450)
PLATELET # BLD EST: ABNORMAL 10*3/UL
POIKILOCYTOSIS BLD QL SMEAR: SLIGHT
RBC # BLD AUTO: 2.83 10E12/L (ref 4.4–5.9)
WBC # BLD AUTO: 68.1 10E9/L (ref 4–11)

## 2019-08-05 PROCEDURE — 85025 COMPLETE CBC W/AUTO DIFF WBC: CPT | Performed by: INTERNAL MEDICINE

## 2019-08-05 PROCEDURE — 36415 COLL VENOUS BLD VENIPUNCTURE: CPT | Performed by: INTERNAL MEDICINE

## 2019-08-05 NOTE — PROGRESS NOTES
Marcie called in to clarify what appt is scheduled for Austin tomorrow. Per chart review, SHAMIR Oliveros wrote on 7/29 that [facility] are recommending the patient receive radiation after his recent Moh's procedure and will be faxing over office notes and pathology results. Austin is scheduled to see Dr. Perry at the [facility] at 9:30 for f/u after Austin's Moh's procedure. Writer gave Marcie the address for  [facility]. Marcie verbalized understanding and is in agreement with the plan.     Tosha Burris RN, BSN  Patient Care Coordinator  [facility]  [phone]

## 2019-08-06 ENCOUNTER — ONCOLOGY VISIT (OUTPATIENT)
Dept: ONCOLOGY | Facility: CLINIC | Age: 79
End: 2019-08-06
Attending: INTERNAL MEDICINE
Payer: COMMERCIAL

## 2019-08-06 VITALS
BODY MASS INDEX: 31.31 KG/M2 | HEART RATE: 102 BPM | OXYGEN SATURATION: 97 % | HEIGHT: 69 IN | RESPIRATION RATE: 16 BRPM | SYSTOLIC BLOOD PRESSURE: 127 MMHG | TEMPERATURE: 97.8 F | WEIGHT: 211.4 LBS | DIASTOLIC BLOOD PRESSURE: 83 MMHG

## 2019-08-06 DIAGNOSIS — C44.329 SQUAMOUS CELL CANCER OF SKIN OF JAWLINE: Primary | ICD-10-CM

## 2019-08-06 PROCEDURE — G0463 HOSPITAL OUTPT CLINIC VISIT: HCPCS

## 2019-08-06 PROCEDURE — 99214 OFFICE O/P EST MOD 30 MIN: CPT | Performed by: INTERNAL MEDICINE

## 2019-08-06 ASSESSMENT — MIFFLIN-ST. JEOR: SCORE: 1674.28

## 2019-08-06 ASSESSMENT — PAIN SCALES - GENERAL: PAINLEVEL: NO PAIN (0)

## 2019-08-06 NOTE — LETTER
"    8/6/2019         RE: Austin Garza  [address] [place] [address]        Dear Colleague,    Thank you for referring your patient, Austin Garza, to the [facility]. Please see a copy of my visit note below.    Oncology Rooming Note    August 6, 2019 9:40 AM   Austin Garza is a 79 year old male who presents for:    No chief complaint on file.    Initial Vitals: Pulse 102   Temp 97.8  F (36.6  C) (Oral)   Resp 16   Ht 1.753 m (5' 9\")   Wt 95.9 kg (211 lb 6.4 oz)   SpO2 97%   BMI 31.22 kg/m    Estimated body mass index is 31.22 kg/m  as calculated from the following:    Height as of this encounter: 1.753 m (5' 9\").    Weight as of this encounter: 95.9 kg (211 lb 6.4 oz). Body surface area is 2.16 meters squared.  No Pain (0) Comment: Data Unavailable   No LMP for male patient.  Allergies reviewed: Yes  Medications reviewed: Yes    Medications: Medication refills not needed today.  Pharmacy name entered into [employer]:    [facility] - [facility] [address] [facility] - [place], [place] - [address] [facility] [place] - [place] - [address] AT [place] & [id number] - [place], [place] - [address] AT [facility]    Clinical concerns: no          Jelena De Luna MA                  [facility]    Hematology/Oncology Established Patient Note      Today's Date: 8/06/19    Reason for Follow-up: CLL      HISTORY OF PRESENT ILLNESS: Austin Garza is a 79 year old male with PMHx of DMII, HTN who presented with leukocytosis.  In November 2017, he was found to have elevated WBC of 61.7K, hemoglobin of 10.4, and platelet of 115K.  There were no other CBC results available between 2010 and 2017.  Prior to 2010, he had normal CBC, with normal to mild anemia, and mild thrombocytopenia.   He was asymptomatic.    He underwent bone marrow biopsy on 11/21/17, " which was consistent with chronic lymphocytic leukemia/small lymphocytic lymphoma, with 13% ringed sideroblasts identified.  The presence of increased numbers of ringed sideroblasts raises the possibility of an associated myelodysplastic syndrome.  Dysplastic changes are not identified though.  Many cases exhibiting ringed sideroblasts are metabolic origin, without significant risk of progression to acute leukemia, and these typically do not show dysplastic morphology as is the case with this specimen.  15% ringed sideroblasts are required for a diagnosis for RARS, which this specimen does not meet.  Flow cytometry is also consistent with CLL/SLL.  Cytogenetics show two (10%) of the metaphase cells comprise a clone characterized by a deletion within the proximal long arm of a chromosome 13 as the sole karyotypic abnormality.  Three (15%) metaphases had loss of the Y chromosome as the sole abnormality.    CT scan on 11/29/17 shows mildly enlarged left axillary lymph node and periaortic lymph nodes in the retroperitoneum of the abdomen.  Spleen is also enlarged.    On his staging CT scan for CLL, he was incidentally found to have a large infrarenal abdominal aortic aneurysm, measuring 7.6 cm.  He was seen by Dr. Thomas, and he underwent EVAR on 12/4/17.       INTERIM HISTORY: Austin is here for follow-up today.  He was seen by Dr. 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  He underwent Moh's procedure for the left jaw lesion on 7/25/19.  The final size was 2.3 x 3.5 cm.  Perineural tumor was seen.  Given the perinerual tumor and size, he was sent for radiation consult at [facility].  Due to proximity to home, patient would like to be seen here.          REVIEW OF SYSTEMS:   14 point ROS was reviewed and is negative other than as noted above in HPI.       HOME MEDICATIONS:  Current Outpatient Medications    Medication Sig Dispense Refill     aspirin (ASA) 81 MG tablet Take 1 tablet (81 mg) by mouth every evening 90 tablet 1     atorvastatin (LIPITOR) 20 MG tablet Take 1 tablet (20 mg) by mouth At Bedtime 90 tablet 2     clopidogrel (PLAVIX) 75 MG tablet Take 1 tablet (75 mg) by mouth daily 90 tablet 1     fluticasone (FLONASE) 50 MCG/ACT nasal spray Spray 2 sprays into both nostrils 2 times daily 1 Bottle 9     glimepiride (AMARYL) 1 MG tablet Take 1 tablet (1 mg) by mouth every morning (before breakfast) 90 tablet 0     losartan-hydrochlorothiazide (HYZAAR) 50-12.5 MG tablet Take 1 tablet by mouth daily 90 tablet 0     metFORMIN (GLUCOPHAGE) 1000 MG tablet Take 1 tablet (1,000 mg) by mouth 2 times daily (with meals) 180 tablet 0     Multiple Vitamins-Minerals (CENTRUM SILVER) per tablet Take 1 tablet by mouth daily           ALLERGIES:  Allergies   Allergen Reactions     Ace Inhibitors      cough         PAST MEDICAL HISTORY:  Past Medical History:   Diagnosis Date     AAA (abdominal aortic aneurysm)      BCC (basal cell carcinoma of skin) - face      CLL (chronic lymphocytic leukemia)      Diaphragmatic hernia      Diverticulitis of colon      Esophageal reflux      Essential hypertension      Hyperlipidemia      Irritable bowel syndrome      Macular degeneration (senile) of retina      Squamous Cell Carcinoma, Back 1988     Type 2 diabetes mellitus          PAST SURGICAL HISTORY:  Past Surgical History:   Procedure Laterality Date     APPENDECTOMY OPEN  1966     BONE MARROW BIOPSY, BONE SPECIMEN, NEEDLE/TROCAR N/A 11/21/2017    Procedure: BIOPSY BONE MARROW;  BONE MARROW BIOPSY;  Surgeon: Shady Garcia MD;  Location:  GI     COLONOSCOPY  2002     ENDOVASCULAR REPAIR ANEURYSM ABDOMINAL AORTA N/A 12/4/2017    Procedure: ENDOVASCULAR REPAIR ANEURYSM ABDOMINAL AORTA;  ENDOVASCULAR REPAIR ANEURYSM ABDOMINAL AORTA;  Surgeon: Milton Cazares MD;  Location:  OR     Fistulotomy with marsupialization  for repair of fisture in ano       IR ABDOMINAL AORTOGRAM  3/11/2019     IR VISCERAL EMBOLIZATION  2019     Multiple skin tags - resection  1998     Squamous cell skin cancer resection - back           SOCIAL HISTORY:  Social History     Socioeconomic History     Marital status:      Spouse name: librado     Number of children: 0     Years of education: 17     Highest education level: Not on file   Occupational History     Occupation: retired   Social Needs     Financial resource strain: Not on file     Food insecurity:     Worry: Not on file     Inability: Not on file     Transportation needs:     Medical: Not on file     Non-medical: Not on file   Tobacco Use     Smoking status: Former Smoker     Packs/day: 0.50     Years: 20.00     Pack years: 10.00     Last attempt to quit: 1975     Years since quittin.6     Smokeless tobacco: Former User   Substance and Sexual Activity     Alcohol use: Yes     Alcohol/week: 6.0 - 8.4 oz     Types: 10 - 14 Standard drinks or equivalent per week     Comment: 2 drinks a day/wine     Drug use: No     Sexual activity: Never   Lifestyle     Physical activity:     Days per week: Not on file     Minutes per session: Not on file     Stress: Not on file   Relationships     Social connections:     Talks on phone: Not on file     Gets together: Not on file     Attends Voodoo service: Not on file     Active member of club or organization: Not on file     Attends meetings of clubs or organizations: Not on file     Relationship status: Not on file     Intimate partner violence:     Fear of current or ex partner: Not on file     Emotionally abused: Not on file     Physically abused: Not on file     Forced sexual activity: Not on file   Other Topics Concern     Parent/sibling w/ CABG, MI or angioplasty before 65F 55M? Not Asked   Social History Narrative     Not on file   He quit smoking in .  He drinks 1-3 glasses of wine a night with dinner.  He is retired,  "and used to work as a .  He lives with his wife in [place].  His cousin had lung cancer and  around age 69.  Otherwise, he denies family history of cancer.        FAMILY HISTORY:  Family History   Problem Relation Age of Onset     Cerebrovascular Disease Father         x 2     Hypertension Mother      C.A.D. Mother      Cancer Mother         skin     Eye Disorder Mother         glaucoma     Prostate Cancer No family hx of      Cancer - colorectal No family hx of      Glaucoma No family hx of      Macular Degeneration No family hx of          PHYSICAL EXAM:  Vital signs:  /83 (BP Location: Right arm, Patient Position: Sitting, Cuff Size: Adult Regular)   Pulse 102   Temp 97.8  F (36.6  C) (Oral)   Resp 16   Ht 1.753 m (5' 9\")   Wt 95.9 kg (211 lb 6.4 oz)   SpO2 97%   BMI 31.22 kg/m      GENERAL/CONSTITUTIONAL: No acute distress. Accompanied by wife.  EYES: No scleral icterus.  NEUROLOGIC: Alert, oriented, answers questions appropriately.  INTEGUMENTARY: No jaundice. S/p excision at left lateral neck and left jaw.      LABS:  CBC RESULTS:   Recent Labs   Lab Test 19  1143   WBC 68.1*   RBC 2.83*   HGB 9.6*   HCT 29.3*   *   MCH 33.9*   MCHC 32.8   RDW 19.1*   PLT 99*         PATHOLOGY:  Bone marrow biopsy 17:  FINAL DIAGNOSIS:   Peripheral blood demonstrating macrocytic anemia with thrombocytopenia     Bone marrow trephine biopsy and aspirate demonstrating hypercellular   bone marrow involved by chronic lymphocytic leukemia/small lymphocytic   lymphoma, 13% ringed sideroblasts identified (see comment)     COMMENT:   Immunophenotyping studies demonstrate a kappa monotypic CD5 positive   B-cell population most compatible with chronic lymphocytic leukemia.  An   immunoperoxidase stain for cyclin D1 is pending.  Dysplastic changes are   not identified within the erythroid series.  However, the presence of   increased numbers of ringed sideroblasts raises the possibility of an "   associated myelodysplastic syndrome, particularly in light of the noted   increased mean red cell volume and associated thrombocytopenia.  The   thrombocytopenia may also reflect reduced numbers of megakaryocytes   given the involvement of marrow by chronic lymphocytic leukemia.  Many   cases exhibiting ringed sideroblasts are metabolic in origin, without   significant risk of progression to acute leukemia, and these typically   do not show dysplastic morphology as is the case with the current   specimen.  Moreover, 15% ringed sideroblasts are required for a   diagnosis of  RARS. Cytogenetic studies are pending.     Flow  INTERPRETATION:   Bone Marrow, Left:        CD5 positive Kappa monotypic B cells (82%)     COMMENT:   The clonal B-cell population present in this specimen has a similar   immunophenotype as reported previously in the blood from this patient   ([id number]).  The immunophenotypic findings are suggestive of marrow   involvement by small lymphocytic lymphoma/chronic lymphocytic leukemia   (SLL/CLL). Large cells may not survive specimen processing.  There may   be peripheral blood contamination. Final interpretation requires   correlation with results of other ancillary studies, morphologic and   clinical features.     ISCN:   46,XY,del(13)(q12q14)[2]/45,X,-Y[3]/46,XY[15].nuc   alton(ATMx2,TP53x1)[4/200],(Q60M595e8,DQVD4e3)[21/200]     INTERPRETATION:   Two (10%) of the metaphase cells analyzed comprised a clone   characterized by a deletion within the proximal long arm of a chromosome   13 as the sole karyotypic abnormality. Additionally, three (15%)   metaphases had loss of the Y chromosome as the sole abnormality.     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     Loss of the Y chromosome, as seen in this case, has been reported both   as an  acquired clonal abnormality associated with hematologic (primarily   myeloid) disorders and also as a clinically insignificant finding   associated with the normal aging process in adult males. When presenting   as a clonal abnormality, loss of Y is typically seen in addition to   other cytogenetic abnormalities. This patient's age and the absence of   other chromosomal abnormalities suggest that the loss of Y represents an   age-related finding in this case.         ASSESSMENT/PLAN:  Austin Garza is a 79 year old male with:    1) Squamous cell carcinoma of the jaw: s/p Moh's procedure, has some high risk features, including size and perineural involvement.  I agree with consideration of radiation.  Patient would like referral to [facility].  -radiation oncology referral placed to [facility]    2) CLL/SLL: BLACKWOOD stage III, with lymphocytosis, lymphadenopathy, splenomegaly, and anemia.  He has mild thrombocytopenia as well, but is above 100K.  He presented with leukocytosis with WBC of 61.7K, hemoglobin of 10.4, and platelet count of 115K on diagnosis.  Bone marrow biopsy and flow cytometry confirmed CLL/SLL.  CT scan shows mildly enlarged left axillary lymph node and periaortic lymph nodes in the retroperitoneum of the abdomen, with enlarged spleen.      CBC from yesterday reviewed with Austin.  WBC is 68.1K, around where his baseline levels have been.  Platelet count and hemoglobin remain in his baseline range.  His hemoglobin is 9.6, improved after blood transfusion about a month ago.  He symptomatically felt better after the transfusion as well.      He has symptoms of fatigue and sweats around his neck at night.  Elevated lymphocyte count is not necessarily an indication to start treatment.  However, I discussed that if these symptoms are thought to be related to the CLL and if his lymphocyte count continues to rise rapidly or if his cytopenias worsen, this may warrant starting treatment.  He says that he  would be okay with that if it is needed.     Since WBC is fairly stable at his baseline, hemoglobin and platelet count are also stable, and his symptoms have not changed, will continue to monitor closely for now.    -he has appointment with me in September 2019 with repeat CBC check    3) Abdominal aortic aneurysm: measures up to 7.6 cm on CT scan, incidentally found.  He underwent EVAR on 12/4/17.  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  He is on Plavix now.  On 5/13/19, he underwent and percutaneous aneurysm sac puncture and endo leak embolization by IR on 5/13/19.  -follow-up with vascular surgery and IR    4) Diabetes, hypertension:  -following with PCP      I spent a total of 25 minutes with the patient, with over >50% of the time in counseling and/or coordination of care.      Breana Perry MD  Hematology/Oncology  [facility]      Again, thank you for allowing me to participate in the care of your patient.        Sincerely,        Breana Perry MD

## 2019-08-06 NOTE — PROGRESS NOTES
"Oncology Rooming Note    August 6, 2019 9:40 AM   Austin Garza is a 79 year old male who presents for:    No chief complaint on file.    Initial Vitals: Pulse 102   Temp 97.8  F (36.6  C) (Oral)   Resp 16   Ht 1.753 m (5' 9\")   Wt 95.9 kg (211 lb 6.4 oz)   SpO2 97%   BMI 31.22 kg/m   Estimated body mass index is 31.22 kg/m  as calculated from the following:    Height as of this encounter: 1.753 m (5' 9\").    Weight as of this encounter: 95.9 kg (211 lb 6.4 oz). Body surface area is 2.16 meters squared.  No Pain (0) Comment: Data Unavailable   No LMP for male patient.  Allergies reviewed: Yes  Medications reviewed: Yes    Medications: Medication refills not needed today.  Pharmacy name entered into [employer]:    [facility] - [facility] [address]  [facility] - [place] [address] [facility] [place] - [place], [place] - [address] AT [facility]Y [id number] - [place], [place] - [address] AT [facility]    Clinical concerns: no          Jelena De Luna MA                "

## 2019-08-06 NOTE — PROGRESS NOTES
[facility]    Hematology/Oncology Established Patient Note      Today's Date: 8/06/19    Reason for Follow-up: CLL      HISTORY OF PRESENT ILLNESS: Austin Garza is a 79 year old male with PMHx of DMII, HTN who presented with leukocytosis.  In November 2017, he was found to have elevated WBC of 61.7K, hemoglobin of 10.4, and platelet of 115K.  There were no other CBC results available between 2010 and 2017.  Prior to 2010, he had normal CBC, with normal to mild anemia, and mild thrombocytopenia.   He was asymptomatic.    He underwent bone marrow biopsy on 11/21/17, which was consistent with chronic lymphocytic leukemia/small lymphocytic lymphoma, with 13% ringed sideroblasts identified.  The presence of increased numbers of ringed sideroblasts raises the possibility of an associated myelodysplastic syndrome.  Dysplastic changes are not identified though.  Many cases exhibiting ringed sideroblasts are metabolic origin, without significant risk of progression to acute leukemia, and these typically do not show dysplastic morphology as is the case with this specimen.  15% ringed sideroblasts are required for a diagnosis for RARS, which this specimen does not meet.  Flow cytometry is also consistent with CLL/SLL.  Cytogenetics show two (10%) of the metaphase cells comprise a clone characterized by a deletion within the proximal long arm of a chromosome 13 as the sole karyotypic abnormality.  Three (15%) metaphases had loss of the Y chromosome as the sole abnormality.    CT scan on 11/29/17 shows mildly enlarged left axillary lymph node and periaortic lymph nodes in the retroperitoneum of the abdomen.  Spleen is also enlarged.    On his staging CT scan for CLL, he was incidentally found to have a large infrarenal abdominal aortic aneurysm, measuring 7.6 cm.  He was seen by Dr. Thomas, and he underwent EVAR on 12/4/17.       INTERIM HISTORY: Austin is here for follow-up today.  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  He underwent Moh's procedure for the left jaw lesion on 7/25/19.  The final size was 2.3 x 3.5 cm.  Perineural tumor was seen.  Given the perinerual tumor and size, he was sent for radiation consult at [facility].  Due to proximity to home, patient would like to be seen here.          REVIEW OF SYSTEMS:   14 point ROS was reviewed and is negative other than as noted above in HPI.       HOME MEDICATIONS:  Current Outpatient Medications   Medication Sig Dispense Refill     aspirin (ASA) 81 MG tablet Take 1 tablet (81 mg) by mouth every evening 90 tablet 1     atorvastatin (LIPITOR) 20 MG tablet Take 1 tablet (20 mg) by mouth At Bedtime 90 tablet 2     clopidogrel (PLAVIX) 75 MG tablet Take 1 tablet (75 mg) by mouth daily 90 tablet 1     fluticasone (FLONASE) 50 MCG/ACT nasal spray Spray 2 sprays into both nostrils 2 times daily 1 Bottle 9     glimepiride (AMARYL) 1 MG tablet Take 1 tablet (1 mg) by mouth every morning (before breakfast) 90 tablet 0     losartan-hydrochlorothiazide (HYZAAR) 50-12.5 MG tablet Take 1 tablet by mouth daily 90 tablet 0     metFORMIN (GLUCOPHAGE) 1000 MG tablet Take 1 tablet (1,000 mg) by mouth 2 times daily (with meals) 180 tablet 0     Multiple Vitamins-Minerals (CENTRUM SILVER) per tablet Take 1 tablet by mouth daily           ALLERGIES:  Allergies   Allergen Reactions     Ace Inhibitors      cough         PAST MEDICAL HISTORY:  Past Medical History:   Diagnosis Date     AAA (abdominal aortic aneurysm)      BCC (basal cell carcinoma of skin) - face      CLL (chronic lymphocytic leukemia)      Diaphragmatic hernia      Diverticulitis of colon      Esophageal reflux      Essential hypertension      Hyperlipidemia      Irritable bowel syndrome      Macular degeneration (senile) of retina      Squamous Cell Carcinoma, Back 1988     Type 2  diabetes mellitus          PAST SURGICAL HISTORY:  Past Surgical History:   Procedure Laterality Date     APPENDECTOMY OPEN       BONE MARROW BIOPSY, BONE SPECIMEN, NEEDLE/TROCAR N/A 2017    Procedure: BIOPSY BONE MARROW;  BONE MARROW BIOPSY;  Surgeon: Shady Garcia MD;  Location:  GI     COLONOSCOPY       ENDOVASCULAR REPAIR ANEURYSM ABDOMINAL AORTA N/A 2017    Procedure: ENDOVASCULAR REPAIR ANEURYSM ABDOMINAL AORTA;  ENDOVASCULAR REPAIR ANEURYSM ABDOMINAL AORTA;  Surgeon: Milton Cazares MD;  Location:  OR     Fistulotomy with marsupialization for repair of fisture in ano       IR ABDOMINAL AORTOGRAM  3/11/2019     IR VISCERAL EMBOLIZATION  2019     Multiple skin tags - resection  1998     Squamous cell skin cancer resection - back           SOCIAL HISTORY:  Social History     Socioeconomic History     Marital status:      Spouse name: librado     Number of children: 0     Years of education: 17     Highest education level: Not on file   Occupational History     Occupation: retired   Social Needs     Financial resource strain: Not on file     Food insecurity:     Worry: Not on file     Inability: Not on file     Transportation needs:     Medical: Not on file     Non-medical: Not on file   Tobacco Use     Smoking status: Former Smoker     Packs/day: 0.50     Years: 20.00     Pack years: 10.00     Last attempt to quit: 1975     Years since quittin.6     Smokeless tobacco: Former User   Substance and Sexual Activity     Alcohol use: Yes     Alcohol/week: 6.0 - 8.4 oz     Types: 10 - 14 Standard drinks or equivalent per week     Comment: 2 drinks a day/wine     Drug use: No     Sexual activity: Never   Lifestyle     Physical activity:     Days per week: Not on file     Minutes per session: Not on file     Stress: Not on file   Relationships     Social connections:     Talks on phone: Not on file     Gets together: Not on file     Attends Holiness  "service: Not on file     Active member of club or organization: Not on file     Attends meetings of clubs or organizations: Not on file     Relationship status: Not on file     Intimate partner violence:     Fear of current or ex partner: Not on file     Emotionally abused: Not on file     Physically abused: Not on file     Forced sexual activity: Not on file   Other Topics Concern     Parent/sibling w/ CABG, MI or angioplasty before 65F 55M? Not Asked   Social History Narrative     Not on file   He quit smoking in .  He drinks 1-3 glasses of wine a night with dinner.  He is retired, and used to work as a .  He lives with his wife in [place].  His cousin had lung cancer and  around age 69.  Otherwise, he denies family history of cancer.        FAMILY HISTORY:  Family History   Problem Relation Age of Onset     Cerebrovascular Disease Father         x 2     Hypertension Mother      C.A.D. Mother      Cancer Mother         skin     Eye Disorder Mother         glaucoma     Prostate Cancer No family hx of      Cancer - colorectal No family hx of      Glaucoma No family hx of      Macular Degeneration No family hx of          PHYSICAL EXAM:  Vital signs:  /83 (BP Location: Right arm, Patient Position: Sitting, Cuff Size: Adult Regular)   Pulse 102   Temp 97.8  F (36.6  C) (Oral)   Resp 16   Ht 1.753 m (5' 9\")   Wt 95.9 kg (211 lb 6.4 oz)   SpO2 97%   BMI 31.22 kg/m     GENERAL/CONSTITUTIONAL: No acute distress. Accompanied by wife.  EYES: No scleral icterus.  NEUROLOGIC: Alert, oriented, answers questions appropriately.  INTEGUMENTARY: No jaundice. S/p excision at left lateral neck and left jaw.      LABS:  CBC RESULTS:   Recent Labs   Lab Test 19  1143   WBC 68.1*   RBC 2.83*   HGB 9.6*   HCT 29.3*   *   MCH 33.9*   MCHC 32.8   RDW 19.1*   PLT 99*         PATHOLOGY:  Bone marrow biopsy 17:  FINAL DIAGNOSIS:   Peripheral blood demonstrating macrocytic anemia with " thrombocytopenia     Bone marrow trephine biopsy and aspirate demonstrating hypercellular   bone marrow involved by chronic lymphocytic leukemia/small lymphocytic   lymphoma, 13% ringed sideroblasts identified (see comment)     COMMENT:   Immunophenotyping studies demonstrate a kappa monotypic CD5 positive   B-cell population most compatible with chronic lymphocytic leukemia.  An   immunoperoxidase stain for cyclin D1 is pending.  Dysplastic changes are   not identified within the erythroid series.  However, the presence of   increased numbers of ringed sideroblasts raises the possibility of an   associated myelodysplastic syndrome, particularly in light of the noted   increased mean red cell volume and associated thrombocytopenia.  The   thrombocytopenia may also reflect reduced numbers of megakaryocytes   given the involvement of marrow by chronic lymphocytic leukemia.  Many   cases exhibiting ringed sideroblasts are metabolic in origin, without   significant risk of progression to acute leukemia, and these typically   do not show dysplastic morphology as is the case with the current   specimen.  Moreover, 15% ringed sideroblasts are required for a   diagnosis of  RARS. Cytogenetic studies are pending.     Flow  INTERPRETATION:   Bone Marrow, Left:        CD5 positive Kappa monotypic B cells (82%)     COMMENT:   The clonal B-cell population present in this specimen has a similar   immunophenotype as reported previously in the blood from this patient   ([id number]).  The immunophenotypic findings are suggestive of marrow   involvement by small lymphocytic lymphoma/chronic lymphocytic leukemia   (SLL/CLL). Large cells may not survive specimen processing.  There may   be peripheral blood contamination. Final interpretation requires   correlation with results of other ancillary studies, morphologic and   clinical features.     ISCN:   46,XY,del(13)(q12q14)[2]/45,X,-Y[3]/46,XY[15].nuc    alton(ATMx2,TP53x1)[4/200],([id number],KLDD7b6)[21/200]     INTERPRETATION:   Two (10%) of the metaphase cells analyzed comprised a clone   characterized by a deletion within the proximal long arm of a chromosome   13 as the sole karyotypic abnormality. Additionally, three (15%)   metaphases had loss of the Y chromosome as the sole abnormality.     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     Loss of the Y chromosome, as seen in this case, has been reported both   as an acquired clonal abnormality associated with hematologic (primarily   myeloid) disorders and also as a clinically insignificant finding   associated with the normal aging process in adult males. When presenting   as a clonal abnormality, loss of Y is typically seen in addition to   other cytogenetic abnormalities. This patient's age and the absence of   other chromosomal abnormalities suggest that the loss of Y represents an   age-related finding in this case.         ASSESSMENT/PLAN:  Austin Garza is a 79 year old male with:    1) Squamous cell carcinoma of the jaw: s/p Moh's procedure, has some high risk features, including size and perineural involvement.  I agree with consideration of radiation.  Patient would like referral to [facility].  -radiation oncology referral placed to [facility]    2) CLL/SLL: BLACKWOOD stage III, with lymphocytosis, lymphadenopathy, splenomegaly, and anemia.  He has mild thrombocytopenia as well, but is above 100K.  He presented with leukocytosis with WBC of 61.7K, hemoglobin of 10.4, and platelet count of 115K on diagnosis.  Bone marrow biopsy and flow cytometry confirmed CLL/SLL.  CT scan shows mildly enlarged left axillary lymph node and periaortic lymph nodes in the retroperitoneum of the abdomen, with enlarged spleen.      CBC from yesterday reviewed with  Austin.  WBC is 68.1K, around where his baseline levels have been.  Platelet count and hemoglobin remain in his baseline range.  His hemoglobin is 9.6, improved after blood transfusion about a month ago.  He symptomatically felt better after the transfusion as well.      He has symptoms of fatigue and sweats around his neck at night.  Elevated lymphocyte count is not necessarily an indication to start treatment.  However, I discussed that if these symptoms are thought to be related to the CLL and if his lymphocyte count continues to rise rapidly or if his cytopenias worsen, this may warrant starting treatment.  He says that he would be okay with that if it is needed.     Since WBC is fairly stable at his baseline, hemoglobin and platelet count are also stable, and his symptoms have not changed, will continue to monitor closely for now.    -he has appointment with me in September 2019 with repeat CBC check    3) Abdominal aortic aneurysm: measures up to 7.6 cm on CT scan, incidentally found.  He underwent EVAR on 12/4/17.  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  He is on Plavix now.  On 5/13/19, he underwent and percutaneous aneurysm sac puncture and endo leak embolization by IR on 5/13/19.  -follow-up with vascular surgery and IR    4) Diabetes, hypertension:  -following with PCP      I spent a total of 25 minutes with the patient, with over >50% of the time in counseling and/or coordination of care.      Breana Perry MD  Hematology/Oncology  [facility]

## 2019-08-06 NOTE — PATIENT INSTRUCTIONS
Information given to Radiation Oncology [place] location- they will call patient to schedule after reviewing chart

## 2019-08-13 ENCOUNTER — TRANSFERRED RECORDS (OUTPATIENT)
Dept: HEALTH INFORMATION MANAGEMENT | Facility: CLINIC | Age: 79
End: 2019-08-13

## 2019-08-14 ENCOUNTER — MYC MEDICAL ADVICE (OUTPATIENT)
Dept: OTHER | Facility: CLINIC | Age: 79
End: 2019-08-14

## 2019-08-15 ENCOUNTER — TELEPHONE (OUTPATIENT)
Dept: PEDIATRICS | Facility: CLINIC | Age: 79
End: 2019-08-15

## 2019-08-15 NOTE — TELEPHONE ENCOUNTER
Reason for call:  Other   Patient called regarding (reason for call): call back  Additional comments: Patient would like to speak to a nurse about diarrhea and if his medication is causing it.   Phone number to reach patient:  Home number on file [phone] (home)    Best Time:  any    Can we leave a detailed message on this number?  YES

## 2019-08-15 NOTE — TELEPHONE ENCOUNTER
I called pt, pt denies any blood in stools, no other changes to medications or eating habits. Pt reports this started back in May 2019.   I explained diarrhea is not a listed contraindication of Plavix. Pt advised to discuss diarrhea with PCP.   If pt does start to show blood in stool to present to ER.   Pt notes understanding.     Cheryl Mitchell, JAMESONN, RN

## 2019-09-08 DIAGNOSIS — I77.70 ARTERY DISSECTION (H): ICD-10-CM

## 2019-09-10 RX ORDER — CLOPIDOGREL BISULFATE 75 MG/1
TABLET ORAL
Qty: 90 TABLET | Refills: 0 | Status: SHIPPED | OUTPATIENT
Start: 2019-09-10 | End: 2019-12-16

## 2019-09-12 ENCOUNTER — HOSPITAL ENCOUNTER (OUTPATIENT)
Facility: CLINIC | Age: 79
Setting detail: SPECIMEN
End: 2019-09-12
Attending: INTERNAL MEDICINE
Payer: COMMERCIAL

## 2019-09-24 ENCOUNTER — HOSPITAL ENCOUNTER (OUTPATIENT)
Facility: CLINIC | Age: 79
Setting detail: SPECIMEN
Discharge: HOME | End: 2019-09-24
Attending: INTERNAL MEDICINE | Admitting: INTERNAL MEDICINE
Payer: COMMERCIAL

## 2019-09-24 ENCOUNTER — ONCOLOGY VISIT (OUTPATIENT)
Dept: ONCOLOGY | Facility: CLINIC | Age: 79
End: 2019-09-24
Attending: INTERNAL MEDICINE
Payer: COMMERCIAL

## 2019-09-24 ENCOUNTER — PATIENT OUTREACH (OUTPATIENT)
Dept: ONCOLOGY | Facility: CLINIC | Age: 79
End: 2019-09-24

## 2019-09-24 DIAGNOSIS — C91.10 CLL (CHRONIC LYMPHOCYTIC LEUKEMIA) (H): ICD-10-CM

## 2019-09-24 LAB
ANISOCYTOSIS BLD QL SMEAR: ABNORMAL
BASOPHILS # BLD AUTO: 0 10E9/L (ref 0–0.2)
BASOPHILS NFR BLD AUTO: 0 %
DACRYOCYTES BLD QL SMEAR: SLIGHT
DIFFERENTIAL METHOD BLD: ABNORMAL
ELLIPTOCYTES BLD QL SMEAR: SLIGHT
EOSINOPHIL # BLD AUTO: 0 10E9/L (ref 0–0.7)
EOSINOPHIL NFR BLD AUTO: 0 %
ERYTHROCYTE [DISTWIDTH] IN BLOOD BY AUTOMATED COUNT: 18 % (ref 10–15)
HCT VFR BLD AUTO: 28.4 % (ref 40–53)
HGB BLD-MCNC: 9 G/DL (ref 13.3–17.7)
LYMPHOCYTES # BLD AUTO: 77.8 10E9/L (ref 0.8–5.3)
LYMPHOCYTES NFR BLD AUTO: 92 %
MACROCYTES BLD QL SMEAR: PRESENT
MCH RBC QN AUTO: 34.4 PG (ref 26.5–33)
MCHC RBC AUTO-ENTMCNC: 31.7 G/DL (ref 31.5–36.5)
MCV RBC AUTO: 108 FL (ref 78–100)
MICROCYTES BLD QL SMEAR: PRESENT
MONOCYTES # BLD AUTO: 0.8 10E9/L (ref 0–1.3)
MONOCYTES NFR BLD AUTO: 1 %
NEUTROPHILS # BLD AUTO: 5.9 10E9/L (ref 1.6–8.3)
NEUTROPHILS NFR BLD AUTO: 7 %
PLATELET # BLD AUTO: 102 10E9/L (ref 150–450)
PLATELET # BLD EST: ABNORMAL 10*3/UL
RBC # BLD AUTO: 2.62 10E12/L (ref 4.4–5.9)
WBC # BLD AUTO: 84.6 10E9/L (ref 4–11)

## 2019-09-24 PROCEDURE — 85025 COMPLETE CBC W/AUTO DIFF WBC: CPT | Performed by: INTERNAL MEDICINE

## 2019-09-24 PROCEDURE — 36415 COLL VENOUS BLD VENIPUNCTURE: CPT

## 2019-09-24 NOTE — NURSING NOTE
Medical Assistant Note:  Austin Garza presents today for blood draw.    Patient seen by provider today: No.   present during visit today: Not Applicable.    Concerns: No Concerns.    Procedure:  Labs drawn    Post Assessment:  Labs drawn without difficulty: Yes.    Discharge Plan:  Departure Mode: Ambulatory.    Face to Face Time: 10 min.    Kimberley Luna CMA

## 2019-09-24 NOTE — LETTER
9/24/2019         RE: Austin Garza  [address] [place] [address]        Dear Colleague,    Thank you for referring your patient, Austin Garza, to the [facility]. Please see a copy of my visit note below.    See nursing note.    Kimberley Luna, JERI on 9/24/2019 at 10:06 AM      Again, thank you for allowing me to participate in the care of your patient.        Sincerely,        [facility] Oncology Nurse

## 2019-09-24 NOTE — PROGRESS NOTES
Received a call from [facility] with critical lab value:    WBC = 84.6.    Will route message to Dr Perry.   Pt does have an appt with Dr Perry on 9/26/2019.  Alysa Campos RN, BSN, OCN

## 2019-09-26 ENCOUNTER — ONCOLOGY VISIT (OUTPATIENT)
Dept: ONCOLOGY | Facility: CLINIC | Age: 79
End: 2019-09-26
Attending: INTERNAL MEDICINE
Payer: COMMERCIAL

## 2019-09-26 ENCOUNTER — TELEPHONE (OUTPATIENT)
Dept: OTHER | Facility: CLINIC | Age: 79
End: 2019-09-26

## 2019-09-26 VITALS
BODY MASS INDEX: 31.07 KG/M2 | RESPIRATION RATE: 16 BRPM | WEIGHT: 209.8 LBS | SYSTOLIC BLOOD PRESSURE: 143 MMHG | DIASTOLIC BLOOD PRESSURE: 83 MMHG | HEART RATE: 89 BPM | HEIGHT: 69 IN | TEMPERATURE: 98.1 F | OXYGEN SATURATION: 98 %

## 2019-09-26 DIAGNOSIS — C91.10 CLL (CHRONIC LYMPHOCYTIC LEUKEMIA) (H): Primary | ICD-10-CM

## 2019-09-26 PROCEDURE — G0463 HOSPITAL OUTPT CLINIC VISIT: HCPCS

## 2019-09-26 PROCEDURE — 99214 OFFICE O/P EST MOD 30 MIN: CPT | Performed by: INTERNAL MEDICINE

## 2019-09-26 ASSESSMENT — MIFFLIN-ST. JEOR: SCORE: 1667.03

## 2019-09-26 ASSESSMENT — PAIN SCALES - GENERAL: PAINLEVEL: NO PAIN (0)

## 2019-09-26 NOTE — PROGRESS NOTES
[facility]    Hematology/Oncology Established Patient Note      Today's Date: 9/26/19    Reason for Follow-up: CLL      HISTORY OF PRESENT ILLNESS: Austin Garza is a 79 year old male with PMHx of DMII, HTN who presented with leukocytosis.  In November 2017, he was found to have elevated WBC of 61.7K, hemoglobin of 10.4, and platelet of 115K.  There were no other CBC results available between 2010 and 2017.  Prior to 2010, he had normal CBC, with normal to mild anemia, and mild thrombocytopenia.   He was asymptomatic.    He underwent bone marrow biopsy on 11/21/17, which was consistent with chronic lymphocytic leukemia/small lymphocytic lymphoma, with 13% ringed sideroblasts identified.  The presence of increased numbers of ringed sideroblasts raises the possibility of an associated myelodysplastic syndrome.  Dysplastic changes are not identified though.  Many cases exhibiting ringed sideroblasts are metabolic origin, without significant risk of progression to acute leukemia, and these typically do not show dysplastic morphology as is the case with this specimen.  15% ringed sideroblasts are required for a diagnosis for RARS, which this specimen does not meet.  Flow cytometry is also consistent with CLL/SLL.  Cytogenetics show two (10%) of the metaphase cells comprise a clone characterized by a deletion within the proximal long arm of a chromosome 13 as the sole karyotypic abnormality.  Three (15%) metaphases had loss of the Y chromosome as the sole abnormality.    CT scan on 11/29/17 shows mildly enlarged left axillary lymph node and periaortic lymph nodes in the retroperitoneum of the abdomen.  Spleen is also enlarged.    On his staging CT scan for CLL, he was incidentally found to have a large infrarenal abdominal aortic aneurysm, measuring 7.6 cm.  He was seen by Dr. Thomas, and he underwent EVAR on 12/4/17.       INTERIM HISTORY: Austin is here for follow-up today.  He is feeling  well.  He completed day 9 of planned 30 days of radiation.  He feels more fatigued, but otherwise doing well.        REVIEW OF SYSTEMS:   14 point ROS was reviewed and is negative other than as noted above in HPI.       HOME MEDICATIONS:  Current Outpatient Medications   Medication Sig Dispense Refill     aspirin (ASA) 81 MG tablet Take 1 tablet (81 mg) by mouth every evening 90 tablet 1     atorvastatin (LIPITOR) 20 MG tablet Take 1 tablet (20 mg) by mouth At Bedtime 90 tablet 2     clopidogrel (PLAVIX) 75 MG tablet TAKE 1 TABLET BY MOUTH EVERY DAY 90 tablet 0     fluticasone (FLONASE) 50 MCG/ACT nasal spray Spray 2 sprays into both nostrils 2 times daily 1 Bottle 9     glimepiride (AMARYL) 1 MG tablet Take 1 tablet (1 mg) by mouth every morning (before breakfast) 90 tablet 0     losartan-hydrochlorothiazide (HYZAAR) 50-12.5 MG tablet TAKE 1 TABLET BY MOUTH EVERY DAY 90 tablet 1     metFORMIN (GLUCOPHAGE) 1000 MG tablet Take 1 tablet (1,000 mg) by mouth 2 times daily (with meals) 180 tablet 0     Multiple Vitamins-Minerals (CENTRUM SILVER) per tablet Take 1 tablet by mouth daily           ALLERGIES:  Allergies   Allergen Reactions     Ace Inhibitors      cough         PAST MEDICAL HISTORY:  Past Medical History:   Diagnosis Date     AAA (abdominal aortic aneurysm)      BCC (basal cell carcinoma of skin) - face      CLL (chronic lymphocytic leukemia)      Diaphragmatic hernia      Diverticulitis of colon      Esophageal reflux      Essential hypertension      Hyperlipidemia      Irritable bowel syndrome      Macular degeneration (senile) of retina      Squamous Cell Carcinoma, Back 1988     Type 2 diabetes mellitus          PAST SURGICAL HISTORY:  Past Surgical History:   Procedure Laterality Date     APPENDECTOMY OPEN  1966     BONE MARROW BIOPSY, BONE SPECIMEN, NEEDLE/TROCAR N/A 11/21/2017    Procedure: BIOPSY BONE MARROW;  BONE MARROW BIOPSY;  Surgeon: Shady Garcia MD;  Location:  GI     COLONOSCOPY   2002     ENDOVASCULAR REPAIR ANEURYSM ABDOMINAL AORTA N/A 2017    Procedure: ENDOVASCULAR REPAIR ANEURYSM ABDOMINAL AORTA;  ENDOVASCULAR REPAIR ANEURYSM ABDOMINAL AORTA;  Surgeon: Milton Cazares MD;  Location: [facility] OR     Fistulotomy with marsupialization for repair of fisture in ano       IR ABDOMINAL AORTOGRAM  3/11/2019     IR VISCERAL EMBOLIZATION  2019     Multiple skin tags - resection  1998     Squamous cell skin cancer resection - back           SOCIAL HISTORY:  Social History     Socioeconomic History     Marital status:      Spouse name: librado     Number of children: 0     Years of education: 17     Highest education level: Not on file   Occupational History     Occupation: retired   Social Needs     Financial resource strain: Not on file     Food insecurity:     Worry: Not on file     Inability: Not on file     Transportation needs:     Medical: Not on file     Non-medical: Not on file   Tobacco Use     Smoking status: Former Smoker     Packs/day: 0.50     Years: 20.00     Pack years: 10.00     Last attempt to quit: 1975     Years since quittin.7     Smokeless tobacco: Former User   Substance and Sexual Activity     Alcohol use: Yes     Alcohol/week: 10.0 - 14.0 standard drinks     Types: 10 - 14 Standard drinks or equivalent per week     Comment: 2 drinks a day/wine     Drug use: No     Sexual activity: Never   Lifestyle     Physical activity:     Days per week: Not on file     Minutes per session: Not on file     Stress: Not on file   Relationships     Social connections:     Talks on phone: Not on file     Gets together: Not on file     Attends Anabaptism service: Not on file     Active member of club or organization: Not on file     Attends meetings of clubs or organizations: Not on file     Relationship status: Not on file     Intimate partner violence:     Fear of current or ex partner: Not on file     Emotionally abused: Not on file     Physically abused:  "Not on file     Forced sexual activity: Not on file   Other Topics Concern     Parent/sibling w/ CABG, MI or angioplasty before 65F 55M? Not Asked   Social History Narrative     Not on file   He quit smoking in .  He drinks 1-3 glasses of wine a night with dinner.  He is retired, and used to work as a .  He lives with his wife in [place].  His cousin had lung cancer and  around age 69.  Otherwise, he denies family history of cancer.        FAMILY HISTORY:  Family History   Problem Relation Age of Onset     Cerebrovascular Disease Father         x 2     Hypertension Mother      C.A.D. Mother      Cancer Mother         skin     Eye Disorder Mother         glaucoma     Prostate Cancer No family hx of      Cancer - colorectal No family hx of      Glaucoma No family hx of      Macular Degeneration No family hx of          PHYSICAL EXAM:  Vital signs:  BP (!) 143/83   Pulse 89   Temp 98.1  F (36.7  C) (Oral)   Resp 16   Ht 1.753 m (5' 9\")   Wt 95.2 kg (209 lb 12.8 oz)   SpO2 98%   BMI 30.98 kg/m     GENERAL/CONSTITUTIONAL: No acute distress.  EYES: No scleral icterus.  NEUROLOGIC: Alert, oriented, answers questions appropriately.  INTEGUMENTARY: No jaundice. S/p excision at left lateral neck and left jaw.      LABS:  CBC RESULTS:   Recent Labs   Lab Test 19  0954   WBC 84.6*   RBC 2.62*   HGB 9.0*   HCT 28.4*   *   MCH 34.4*   MCHC 31.7   RDW 18.0*   *         ASSESSMENT/PLAN:  Austin Garza is a 79 year old male with:    1) CLL/SLL: BLACKWOOD stage III, with lymphocytosis, lymphadenopathy, splenomegaly, and anemia.  He has mild thrombocytopenia as well, but is above 100K.  He presented with leukocytosis with WBC of 61.7K, hemoglobin of 10.4, and platelet count of 115K on diagnosis.  Bone marrow biopsy and flow cytometry confirmed CLL/SLL.  CT scan shows mildly enlarged left axillary lymph node and periaortic lymph nodes in the retroperitoneum of the abdomen, with enlarged spleen.  "     CBC from 9/24/19 reviewed with Austin.  WBC is 84.6K, around where his baseline levels have been.  Platelet count and hemoglobin remain in his baseline range.       He has symptoms of fatigue and sweats around his neck at night.  Elevated lymphocyte count is not necessarily an indication to start treatment.  However, I discussed that if these symptoms are thought to be related to the CLL and if his lymphocyte count continues to rise rapidly or if his cytopenias worsen, this may warrant starting treatment.  He says that he would be okay with that if it is needed.     Since WBC is fairly stable at his baseline, hemoglobin and platelet count are also stable, and his symptoms have not changed, will continue to monitor closely for now.    -he asked about transfusion parameter.  We usually transfuse for <7, but previously, he was symptomatic with fatigue at around 8.1.  He got a blood transfusion and felt much better.  We agreed on transfusion parameter of hemoglobin <8.    -repeat CBC check in 1 month  -RTC in 2 months with CBC; I will be out on leave then - will have him see Dr. 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 He is undergoing radiation at [facility] with planned 60 Gy x 30 fractions.  He received day 9 of planned 30 days of radiation today.  -continue radiation, as per radiation oncology    3) Abdominal aortic aneurysm: measures up to 7.6 cm on CT scan, incidentally found.  He underwent EVAR on 12/4/17.  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  He is on Plavix now.  On 5/13/19, he underwent and percutaneous aneurysm sac puncture and endo leak embolization by IR on 5/13/19.  -follow-up with vascular surgery and IR    4) Diabetes, hypertension:  -following with PCP      I spent a total of 25 minutes with the patient, with over >50% of the time in counseling and/or coordination of  care.      Breana Perry MD  Hematology/Oncology  [facility]

## 2019-09-26 NOTE — LETTER
9/26/2019         RE: Austin Garza  [address] [place] [address]        Dear Colleague,    Thank you for referring your patient, Austin Garza, to the [facility]. Please see a copy of my visit note below.    [facility]    Hematology/Oncology Established Patient Note      Today's Date: 9/26/19    Reason for Follow-up: CLL      HISTORY OF PRESENT ILLNESS: Austin Garza is a 79 year old male with PMHx of DMII, HTN who presented with leukocytosis.  In November 2017, he was found to have elevated WBC of 61.7K, hemoglobin of 10.4, and platelet of 115K.  There were no other CBC results available between 2010 and 2017.  Prior to 2010, he had normal CBC, with normal to mild anemia, and mild thrombocytopenia.   He was asymptomatic.    He underwent bone marrow biopsy on 11/21/17, which was consistent with chronic lymphocytic leukemia/small lymphocytic lymphoma, with 13% ringed sideroblasts identified.  The presence of increased numbers of ringed sideroblasts raises the possibility of an associated myelodysplastic syndrome.  Dysplastic changes are not identified though.  Many cases exhibiting ringed sideroblasts are metabolic origin, without significant risk of progression to acute leukemia, and these typically do not show dysplastic morphology as is the case with this specimen.  15% ringed sideroblasts are required for a diagnosis for RARS, which this specimen does not meet.  Flow cytometry is also consistent with CLL/SLL.  Cytogenetics show two (10%) of the metaphase cells comprise a clone characterized by a deletion within the proximal long arm of a chromosome 13 as the sole karyotypic abnormality.  Three (15%) metaphases had loss of the Y chromosome as the sole abnormality.    CT scan on 11/29/17 shows mildly enlarged left axillary lymph node and periaortic lymph nodes in the retroperitoneum of the abdomen.  Spleen is also enlarged.    On his staging CT scan for  CLL, he was incidentally found to have a large infrarenal abdominal aortic aneurysm, measuring 7.6 cm.  He was seen by Dr. Thomas, and he underwent EVAR on 12/4/17.       INTERIM HISTORY: Austin is here for follow-up today.  He is feeling well.  He completed day 9 of planned 30 days of radiation.  He feels more fatigued, but otherwise doing well.        REVIEW OF SYSTEMS:   14 point ROS was reviewed and is negative other than as noted above in HPI.       HOME MEDICATIONS:  Current Outpatient Medications   Medication Sig Dispense Refill     aspirin (ASA) 81 MG tablet Take 1 tablet (81 mg) by mouth every evening 90 tablet 1     atorvastatin (LIPITOR) 20 MG tablet Take 1 tablet (20 mg) by mouth At Bedtime 90 tablet 2     clopidogrel (PLAVIX) 75 MG tablet TAKE 1 TABLET BY MOUTH EVERY DAY 90 tablet 0     fluticasone (FLONASE) 50 MCG/ACT nasal spray Spray 2 sprays into both nostrils 2 times daily 1 Bottle 9     glimepiride (AMARYL) 1 MG tablet Take 1 tablet (1 mg) by mouth every morning (before breakfast) 90 tablet 0     losartan-hydrochlorothiazide (HYZAAR) 50-12.5 MG tablet TAKE 1 TABLET BY MOUTH EVERY DAY 90 tablet 1     metFORMIN (GLUCOPHAGE) 1000 MG tablet Take 1 tablet (1,000 mg) by mouth 2 times daily (with meals) 180 tablet 0     Multiple Vitamins-Minerals (CENTRUM SILVER) per tablet Take 1 tablet by mouth daily           ALLERGIES:  Allergies   Allergen Reactions     Ace Inhibitors      cough         PAST MEDICAL HISTORY:  Past Medical History:   Diagnosis Date     AAA (abdominal aortic aneurysm)      BCC (basal cell carcinoma of skin) - face      CLL (chronic lymphocytic leukemia)      Diaphragmatic hernia      Diverticulitis of colon      Esophageal reflux      Essential hypertension      Hyperlipidemia      Irritable bowel syndrome      Macular degeneration (senile) of retina      Squamous Cell Carcinoma, Back 1988     Type 2 diabetes mellitus          PAST SURGICAL HISTORY:  Past Surgical History:   Procedure  Laterality Date     APPENDECTOMY OPEN  1966     BONE MARROW BIOPSY, BONE SPECIMEN, NEEDLE/TROCAR N/A 2017    Procedure: BIOPSY BONE MARROW;  BONE MARROW BIOPSY;  Surgeon: Shady Garcia MD;  Location:  GI     COLONOSCOPY       ENDOVASCULAR REPAIR ANEURYSM ABDOMINAL AORTA N/A 2017    Procedure: ENDOVASCULAR REPAIR ANEURYSM ABDOMINAL AORTA;  ENDOVASCULAR REPAIR ANEURYSM ABDOMINAL AORTA;  Surgeon: Milton Cazares MD;  Location: [facility] OR     Fistulotomy with marsupialization for repair of fisture in ano       IR ABDOMINAL AORTOGRAM  3/11/2019     IR VISCERAL EMBOLIZATION  2019     Multiple skin tags - resection       Squamous cell skin cancer resection - back           SOCIAL HISTORY:  Social History     Socioeconomic History     Marital status:      Spouse name: librado     Number of children: 0     Years of education: 17     Highest education level: Not on file   Occupational History     Occupation: retired   Social Needs     Financial resource strain: Not on file     Food insecurity:     Worry: Not on file     Inability: Not on file     Transportation needs:     Medical: Not on file     Non-medical: Not on file   Tobacco Use     Smoking status: Former Smoker     Packs/day: 0.50     Years: 20.00     Pack years: 10.00     Last attempt to quit: 1975     Years since quittin.7     Smokeless tobacco: Former User   Substance and Sexual Activity     Alcohol use: Yes     Alcohol/week: 10.0 - 14.0 standard drinks     Types: 10 - 14 Standard drinks or equivalent per week     Comment: 2 drinks a day/wine     Drug use: No     Sexual activity: Never   Lifestyle     Physical activity:     Days per week: Not on file     Minutes per session: Not on file     Stress: Not on file   Relationships     Social connections:     Talks on phone: Not on file     Gets together: Not on file     Attends Yazdanism service: Not on file     Active member of club or organization: Not on  "file     Attends meetings of clubs or organizations: Not on file     Relationship status: Not on file     Intimate partner violence:     Fear of current or ex partner: Not on file     Emotionally abused: Not on file     Physically abused: Not on file     Forced sexual activity: Not on file   Other Topics Concern     Parent/sibling w/ CABG, MI or angioplasty before 65F 55M? Not Asked   Social History Narrative     Not on file   He quit smoking in .  He drinks 1-3 glasses of wine a night with dinner.  He is retired, and used to work as a .  He lives with his wife in [place].  His cousin had lung cancer and  around age 69.  Otherwise, he denies family history of cancer.        FAMILY HISTORY:  Family History   Problem Relation Age of Onset     Cerebrovascular Disease Father         x 2     Hypertension Mother      C.A.D. Mother      Cancer Mother         skin     Eye Disorder Mother         glaucoma     Prostate Cancer No family hx of      Cancer - colorectal No family hx of      Glaucoma No family hx of      Macular Degeneration No family hx of          PHYSICAL EXAM:  Vital signs:  BP (!) 143/83   Pulse 89   Temp 98.1  F (36.7  C) (Oral)   Resp 16   Ht 1.753 m (5' 9\")   Wt 95.2 kg (209 lb 12.8 oz)   SpO2 98%   BMI 30.98 kg/m      GENERAL/CONSTITUTIONAL: No acute distress.  EYES: No scleral icterus.  NEUROLOGIC: Alert, oriented, answers questions appropriately.  INTEGUMENTARY: No jaundice. S/p excision at left lateral neck and left jaw.      LABS:  CBC RESULTS:   Recent Labs   Lab Test 19  0954   WBC 84.6*   RBC 2.62*   HGB 9.0*   HCT 28.4*   *   MCH 34.4*   MCHC 31.7   RDW 18.0*   *         ASSESSMENT/PLAN:  Austin Garza is a 79 year old male with:    1) CLL/SLL: BLACKWOOD stage III, with lymphocytosis, lymphadenopathy, splenomegaly, and anemia.  He has mild thrombocytopenia as well, but is above 100K.  He presented with leukocytosis with WBC of 61.7K, hemoglobin of 10.4, and " platelet count of 115K on diagnosis.  Bone marrow biopsy and flow cytometry confirmed CLL/SLL.  CT scan shows mildly enlarged left axillary lymph node and periaortic lymph nodes in the retroperitoneum of the abdomen, with enlarged spleen.      CBC from 9/24/19 reviewed with Austin.  WBC is 84.6K, around where his baseline levels have been.  Platelet count and hemoglobin remain in his baseline range.       He has symptoms of fatigue and sweats around his neck at night.  Elevated lymphocyte count is not necessarily an indication to start treatment.  However, I discussed that if these symptoms are thought to be related to the CLL and if his lymphocyte count continues to rise rapidly or if his cytopenias worsen, this may warrant starting treatment.  He says that he would be okay with that if it is needed.     Since WBC is fairly stable at his baseline, hemoglobin and platelet count are also stable, and his symptoms have not changed, will continue to monitor closely for now.    -he asked about transfusion parameter.  We usually transfuse for <7, but previously, he was symptomatic with fatigue at around 8.1.  He got a blood transfusion and felt much better.  We agreed on transfusion parameter of hemoglobin <8.    -repeat CBC check in 1 month  -RTC in 2 months with CBC; I will be out on leave then - will have him see Dr. 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 He is undergoing radiation at [facility] with planned 60 Gy x 30 fractions.  He received day 9 of planned 30 days of radiation today.  -continue radiation, as per radiation oncology    3) Abdominal aortic aneurysm: measures up to 7.6 cm on CT scan, incidentally found.  He underwent EVAR on 12/4/17.  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  He is on Plavix now.  On 5/13/19, he underwent and percutaneous aneurysm sac puncture and endo leak embolization  by IR on 5/13/19.  -follow-up with vascular surgery and IR    4) Diabetes, hypertension:  -following with PCP      I spent a total of 25 minutes with the patient, with over >50% of the time in counseling and/or coordination of care.      Breana Perry MD  Hematology/Oncology  [facility]      Again, thank you for allowing me to participate in the care of your patient.        Sincerely,        Breana Perry MD

## 2019-09-26 NOTE — TELEPHONE ENCOUNTER
"Routing to Dr. Cazares for verification I should now order CTA abd/pelvis:      Pt is due this month for CTA abd/pelvis and OV with Dr. Cazares.     3/22/19 Dr. Cazares noted \"He is following up with IR April 18th with CTA and then discussion about possible direct sac puncture to treat his type II endoleak from the YOHAN.  He continues to be asymptomatic and I've asked him to call if he develops significant discomfort in his low back/abdomen.  If the endoleak is treated I will see him back in 6 months with a repeat CTA of the abdomen/pelvis.\"    3/11/19 Dr. Verma noted \"IMPRESSION:  1.  Unsuccessful attempted cannulation of the middle colic artery for  embolization of type II endoleak.  2.  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   Plan:  CTA in 1 month to evaluate dissection.  Vascular medicine consult with initiation of Plavix therapy.  Following CTA, patient will be seen in clinic and scheduled for likely  percutaneous embolization of endoleak.\"    No CTA done past 3/11/19.     Cheryl Mitchell, JAMESONN, RN    "

## 2019-09-26 NOTE — NURSING NOTE
"Oncology Rooming Note    September 26, 2019 12:46 PM   Austin Garza is a 79 year old male who presents for:    Chief Complaint   Patient presents with     Oncology Clinic Visit     CLL (chronic lymphocytic leukemia     Initial Vitals: BP (!) 143/83   Pulse 89   Temp 98.1  F (36.7  C) (Oral)   Resp 16   Ht 1.753 m (5' 9\")   Wt 95.2 kg (209 lb 12.8 oz)   SpO2 98%   BMI 30.98 kg/m   Estimated body mass index is 30.98 kg/m  as calculated from the following:    Height as of this encounter: 1.753 m (5' 9\").    Weight as of this encounter: 95.2 kg (209 lb 12.8 oz). Body surface area is 2.15 meters squared.  No Pain (0) Comment: Data Unavailable   No LMP for male patient.  Allergies reviewed: Yes  Medications reviewed: Yes    Medications: Medication refills not needed today.  Pharmacy name entered into Button:    [facility] - [place], [place] - [address]  [facility] - [place], [place] - [address] [facility] [place] AT [facility] [id number] - [place], [place] - [address] AT [facility]    Clinical concerns: Follow Up      Kimberley Luna CMA              "

## 2019-09-27 ENCOUNTER — TRANSFERRED RECORDS (OUTPATIENT)
Dept: HEALTH INFORMATION MANAGEMENT | Facility: CLINIC | Age: 79
End: 2019-09-27

## 2019-09-30 NOTE — TELEPHONE ENCOUNTER
Per Dr. Cazares, pt needs CTA abd/pelvis with contrast and OV sooner rather than later.     Routing to  to coordinate. Order is in Epic.     Cheryl Mitchell, JAMESONN, RN

## 2019-10-15 ENCOUNTER — HOSPITAL ENCOUNTER (OUTPATIENT)
Dept: CT IMAGING | Facility: CLINIC | Age: 79
Discharge: HOME | End: 2019-10-15
Attending: RADIOLOGY | Admitting: RADIOLOGY
Payer: COMMERCIAL

## 2019-10-15 DIAGNOSIS — I71.40 ABDOMINAL AORTIC ANEURYSM (AAA) WITHOUT RUPTURE (H): ICD-10-CM

## 2019-10-15 LAB
CREAT BLD-MCNC: 1.6 MG/DL (ref 0.66–1.25)
GFR SERPL CREATININE-BSD FRML MDRD: 42 ML/MIN/{1.73_M2}

## 2019-10-15 PROCEDURE — 25000128 H RX IP 250 OP 636: Performed by: RADIOLOGY

## 2019-10-15 PROCEDURE — 82565 ASSAY OF CREATININE: CPT

## 2019-10-15 PROCEDURE — 25000125 ZZHC RX 250: Performed by: RADIOLOGY

## 2019-10-15 PROCEDURE — 74174 CTA ABD&PLVS W/CONTRAST: CPT

## 2019-10-15 RX ORDER — IOPAMIDOL 755 MG/ML
500 INJECTION, SOLUTION INTRAVASCULAR ONCE
Status: COMPLETED | OUTPATIENT
Start: 2019-10-15 | End: 2019-10-15

## 2019-10-15 RX ADMIN — IOPAMIDOL 80 ML: 755 INJECTION, SOLUTION INTRAVENOUS at 10:02

## 2019-10-15 RX ADMIN — SODIUM CHLORIDE 80 ML: 9 INJECTION, SOLUTION INTRAVENOUS at 10:02

## 2019-10-22 ENCOUNTER — HOSPITAL ENCOUNTER (OUTPATIENT)
Facility: CLINIC | Age: 79
Setting detail: SPECIMEN
Discharge: HOME | End: 2019-10-22
Attending: INTERNAL MEDICINE | Admitting: INTERNAL MEDICINE
Payer: COMMERCIAL

## 2019-10-22 ENCOUNTER — ONCOLOGY VISIT (OUTPATIENT)
Dept: ONCOLOGY | Facility: CLINIC | Age: 79
End: 2019-10-22
Attending: INTERNAL MEDICINE
Payer: COMMERCIAL

## 2019-10-22 ENCOUNTER — PATIENT OUTREACH (OUTPATIENT)
Dept: ONCOLOGY | Facility: CLINIC | Age: 79
End: 2019-10-22

## 2019-10-22 DIAGNOSIS — C91.10 CLL (CHRONIC LYMPHOCYTIC LEUKEMIA) (H): ICD-10-CM

## 2019-10-22 LAB
ANISOCYTOSIS BLD QL SMEAR: ABNORMAL
BASOPHILS # BLD AUTO: 0 10E9/L (ref 0–0.2)
BASOPHILS NFR BLD AUTO: 0 %
DACRYOCYTES BLD QL SMEAR: SLIGHT
DIFFERENTIAL METHOD BLD: ABNORMAL
ELLIPTOCYTES BLD QL SMEAR: SLIGHT
EOSINOPHIL # BLD AUTO: 0.7 10E9/L (ref 0–0.7)
EOSINOPHIL NFR BLD AUTO: 1 %
ERYTHROCYTE [DISTWIDTH] IN BLOOD BY AUTOMATED COUNT: 16.9 % (ref 10–15)
HCT VFR BLD AUTO: 29 % (ref 40–53)
HGB BLD-MCNC: 9 G/DL (ref 13.3–17.7)
LYMPHOCYTES # BLD AUTO: 60.5 10E9/L (ref 0.8–5.3)
LYMPHOCYTES NFR BLD AUTO: 92 %
MACROCYTES BLD QL SMEAR: PRESENT
MCH RBC QN AUTO: 34.6 PG (ref 26.5–33)
MCHC RBC AUTO-ENTMCNC: 31 G/DL (ref 31.5–36.5)
MCV RBC AUTO: 112 FL (ref 78–100)
MICROCYTES BLD QL SMEAR: PRESENT
MONOCYTES # BLD AUTO: 0.7 10E9/L (ref 0–1.3)
MONOCYTES NFR BLD AUTO: 1 %
NEUTROPHILS # BLD AUTO: 3.9 10E9/L (ref 1.6–8.3)
NEUTROPHILS NFR BLD AUTO: 6 %
PLATELET # BLD AUTO: 88 10E9/L (ref 150–450)
PLATELET # BLD EST: ABNORMAL 10*3/UL
RBC # BLD AUTO: 2.6 10E12/L (ref 4.4–5.9)
VARIANT LYMPHS BLD QL SMEAR: PRESENT
WBC # BLD AUTO: 65.8 10E9/L (ref 4–11)

## 2019-10-22 PROCEDURE — 36415 COLL VENOUS BLD VENIPUNCTURE: CPT

## 2019-10-22 PROCEDURE — 85025 COMPLETE CBC W/AUTO DIFF WBC: CPT | Performed by: INTERNAL MEDICINE

## 2019-10-22 NOTE — PROGRESS NOTES
Received a call from  Former[facility] with critical value:    WBC = 65.8.    Will route to Dr Perry for recommendations.  Alysa Campos RN, BSN, OCN

## 2019-10-22 NOTE — LETTER
10/22/2019         RE: Austin Garza  [address] [place] [address]        Dear Colleague,    Thank you for referring your patient, Austin Garza, to the [facility]. Please see a copy of my visit note below.    Medical Assistant Note:  Austin Garza presents today for blood draw.    Patient seen by provider today: No.   present during visit today: Not Applicable.    Concerns: No Concerns.    Procedure:  Lab draw site: left antecub, Needle type: butterfly, Gauge: 23.    Post Assessment:  Labs drawn without difficulty: Yes.    Discharge Plan:  Departure Mode: Ambulatory.    Face to Face Time: 10.    Geetha Benítez CMA            Again, thank you for allowing me to participate in the care of your patient.        Sincerely,        [facility] Oncology Nurse

## 2019-10-22 NOTE — PROGRESS NOTES
Dr Perry notified and aware, result similar to his last lab and no further interventions at this time. Pt called and informed with elevated WBC.  Pt asking about his hemoglobin as he has fatigue and felt better last time when transfused several months ago.  Dr Perry notified and noted his parameters are hgb 8 or below.  Pt scheduled for repeat labs in one month but MD states pt can have repeat labs in a few weeks or to call if symptoms worsening.  Pt called back and states he would like to keep lab appt as scheduled but will call back with worsening symptoms or new concerns.      Shirin Marin, RN, BSN, OCN

## 2019-10-25 ENCOUNTER — TRANSFERRED RECORDS (OUTPATIENT)
Dept: HEALTH INFORMATION MANAGEMENT | Facility: CLINIC | Age: 79
End: 2019-10-25

## 2019-11-05 DIAGNOSIS — E11.9 TYPE 2 DIABETES MELLITUS WITHOUT COMPLICATION, WITHOUT LONG-TERM CURRENT USE OF INSULIN (H): ICD-10-CM

## 2019-11-05 RX ORDER — GLIMEPIRIDE 1 MG/1
TABLET ORAL
Qty: 90 TABLET | Refills: 2 | Status: SHIPPED | OUTPATIENT
Start: 2019-11-05 | End: 2019-12-16

## 2019-11-05 NOTE — TELEPHONE ENCOUNTER
Prescription approved per [facility] Refill Protocol. Marley Ruvalcaba RN November 5, 2019 4:17 PM

## 2019-11-11 NOTE — TELEPHONE ENCOUNTER
[facility] [place] called today to let us know that patient had a Moh's procedure last Thursday and they feel he needs to have radiation.  They would like him to see Dr. Perry for a consult and her recommendations.  They are faxing over patient's pathology results, the most recent MD notes, and their recommendations.  Awaiting fax.  Dr. Perry is booked out a few weeks in [place].  Will message her to see when she would like to see patient.  Will call patient once we hear from Dr. Perry when she can see patient.  Best number to reach patient at is [phone].  
Dr. Perry will see pt at [facility] next Tuesday 8/6.  Pt happy with plan of care and will plan to see Dr. Perry then.  No further questions or concerns at this time.    
Statement Selected

## 2019-11-12 ENCOUNTER — TELEPHONE (OUTPATIENT)
Dept: OTHER | Facility: CLINIC | Age: 79
End: 2019-11-12

## 2019-11-12 DIAGNOSIS — I71.40 ABDOMINAL AORTIC ANEURYSM (AAA) WITHOUT RUPTURE (H): Primary | ICD-10-CM

## 2019-11-12 NOTE — TELEPHONE ENCOUNTER
Left message on VM.  Dr. Gauthier reviewed CTA abdomen and pelvis.  Recommend 6 mo f/u.  No endoleak detected, aneurysm sac is smaller.   Lisette Hinojosa RN  IR nurse clinician  [phone]

## 2019-11-22 NOTE — TELEPHONE ENCOUNTER
Alternative requested:    Losarta-hydrochlorothiazide is on  back order, please send in sperate Rx for each separate med.    Suggested:     Losartan potassium 50mg tab    Hydrochlorothiazide 12.5 mg tab   Assessment/Plan:      Quality Measures:       Return in about 6 months (around 5/22/2020) for Next scheduled follow up  Diagnoses and all orders for this visit:    Anxiety    Recurrent cold sores  -     Discontinue: penciclovir (DENAVIR) 1 % cream; Apply topically every 2 (two) hours  -     penciclovir (DENAVIR) 1 % cream; Apply topically every 2 (two) hours          Subjective:      Patient ID: David Dominguez is a 46 y o  female  Follow-up    Patient currently on fluoxetine secondary to anxiety  She has found this very effective  She is a teacher, 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  Patient has asked for refill of Denavir due to cold sores  Often stress brings them on  Labs done were reviewed with patient  No other focal concerns  ALLERGIES:  No Known Allergies    CURRENT MEDICATIONS:    Current Outpatient Medications:     FLUoxetine (PROzac) 20 MG tablet, TAKE 1 TABLET BY MOUTH EVERY DAY, Disp: 90 tablet, Rfl: 1    penciclovir (DENAVIR) 1 % cream, Apply topically every 2 (two) hours, Disp: 1 5 g, Rfl: 5    ACTIVE PROBLEM LIST:  Patient Active Problem List   Diagnosis    Annual physical exam    Anxiety       PAST MEDICAL HISTORY:  History reviewed  No pertinent past medical history  PAST SURGICAL HISTORY:  History reviewed  No pertinent surgical history      FAMILY HISTORY:  Family History   Problem Relation Age of Onset    COPD Father     Hyperlipidemia Father     Asthma Father     Heart disease Maternal Grandmother     Breast cancer Maternal Grandmother     Diabetes Maternal Grandmother     Heart disease Maternal Grandfather     Diabetes Maternal Grandfather     Diabetes Paternal Grandmother        SOCIAL HISTORY:  Social History     Socioeconomic History    Marital status: /Civil Union     Spouse name: Not on file    Number of children: Not on file    Years of education: Not on file    Highest education level: Not on file   Occupational History    Not on file   Social Needs    Financial resource strain: Not on file    Food insecurity:     Worry: Not on file     Inability: Not on file    Transportation needs:     Medical: Not on file     Non-medical: Not on file   Tobacco Use    Smoking status: Never Smoker    Smokeless tobacco: Never Used   Substance and Sexual Activity    Alcohol use: Yes     Alcohol/week: 2 0 standard drinks     Types: 1 Glasses of wine, 1 Cans of beer per week     Comment: per week    Drug use: No    Sexual activity: Yes     Partners: Male   Lifestyle    Physical activity:     Days per week: Not on file     Minutes per session: Not on file    Stress: Not on file   Relationships    Social connections:     Talks on phone: Not on file     Gets together: Not on file     Attends Yazidism service: Not on file     Active member of club or organization: Not on file     Attends meetings of clubs or organizations: Not on file     Relationship status: Not on file    Intimate partner violence:     Fear of current or ex partner: Not on file     Emotionally abused: Not on file     Physically abused: Not on file     Forced sexual activity: Not on file   Other Topics Concern    Not on file   Social History Narrative        2 children    Teacher    Regular dental care, brushes twice daily    Hobbies-reading       Review of Systems   Constitutional: Negative for activity change, chills, fatigue and fever  HENT: Negative for congestion  Eyes: Negative for discharge  Respiratory: Negative for cough, chest tightness and shortness of breath  Cardiovascular: Negative for chest pain, palpitations and leg swelling  Gastrointestinal: Negative for abdominal pain  Genitourinary: Negative for difficulty urinating  Musculoskeletal: Negative for arthralgias and myalgias  Skin: Negative for rash  Allergic/Immunologic: Negative for immunocompromised state     Neurological: Negative for dizziness, syncope, weakness, light-headedness and headaches  Hematological: Negative for adenopathy  Does not bruise/bleed easily  Psychiatric/Behavioral: Negative for dysphoric mood, sleep disturbance and suicidal ideas  The patient is not nervous/anxious  Objective:  Vitals:    11/22/19 0755   BP: 110/70   BP Location: Left arm   Patient Position: Sitting   Cuff Size: Standard   Pulse: 68   Resp: 16   SpO2: 98%   Weight: 62 1 kg (137 lb)   Height: 5' 5" (1 651 m)     Body mass index is 22 8 kg/m²  Physical Exam   Constitutional: She is oriented to person, place, and time  She appears well-developed and well-nourished  No distress  HENT:   Head: Normocephalic  Neck: Neck supple  No JVD present  Carotid bruit is not present  No thyromegaly present  Cardiovascular: Normal rate, regular rhythm and normal heart sounds  Pulmonary/Chest: Effort normal and breath sounds normal    Musculoskeletal: She exhibits no edema  Lymphadenopathy:     She has no cervical adenopathy  Neurological: She is alert and oriented to person, place, and time  Skin: Skin is warm and dry  No rash noted  Psychiatric: She has a normal mood and affect  Her behavior is normal  Judgment and thought content normal    Nursing note and vitals reviewed          RESULTS:    Recent Results (from the past 1008 hour(s))   CBC and differential    Collection Time: 11/20/19  7:45 AM   Result Value Ref Range    WBC 4 43 4 31 - 10 16 Thousand/uL    RBC 4 35 3 81 - 5 12 Million/uL    Hemoglobin 13 5 11 5 - 15 4 g/dL    Hematocrit 40 1 34 8 - 46 1 %    MCV 92 82 - 98 fL    MCH 31 0 26 8 - 34 3 pg    MCHC 33 7 31 4 - 37 4 g/dL    RDW 11 9 11 6 - 15 1 %    MPV 10 4 8 9 - 12 7 fL    Platelets 286 585 - 228 Thousands/uL    nRBC 0 /100 WBCs    Neutrophils Relative 49 43 - 75 %    Immat GRANS % 0 0 - 2 %    Lymphocytes Relative 42 14 - 44 %    Monocytes Relative 7 4 - 12 %    Eosinophils Relative 1 0 - 6 %    Basophils Relative 1 0 - 1 %    Neutrophils Absolute 2 16 1 85 - 7 62 Thousands/µL    Immature Grans Absolute 0 01 0 00 - 0 20 Thousand/uL    Lymphocytes Absolute 1 85 0 60 - 4 47 Thousands/µL    Monocytes Absolute 0 31 0 17 - 1 22 Thousand/µL    Eosinophils Absolute 0 05 0 00 - 0 61 Thousand/µL    Basophils Absolute 0 05 0 00 - 0 10 Thousands/µL   Comprehensive metabolic panel    Collection Time: 11/20/19  7:45 AM   Result Value Ref Range    Sodium 138 136 - 145 mmol/L    Potassium 4 9 3 5 - 5 3 mmol/L    Chloride 109 (H) 100 - 108 mmol/L    CO2 28 21 - 32 mmol/L    ANION GAP 1 (L) 4 - 13 mmol/L    BUN 11 5 - 25 mg/dL    Creatinine 0 72 0 60 - 1 30 mg/dL    Glucose, Fasting 92 65 - 99 mg/dL    Calcium 8 9 8 3 - 10 1 mg/dL    AST 10 5 - 45 U/L    ALT 12 12 - 78 U/L    Alkaline Phosphatase 51 46 - 116 U/L    Total Protein 7 0 6 4 - 8 2 g/dL    Albumin 3 6 3 5 - 5 0 g/dL    Total Bilirubin 1 41 (H) 0 20 - 1 00 mg/dL    eGFR 106 ml/min/1 73sq m   Lipid panel    Collection Time: 11/20/19  7:45 AM   Result Value Ref Range    Cholesterol 174 50 - 200 mg/dL    Triglycerides 56 <=150 mg/dL    HDL, Direct 59 >=40 mg/dL    LDL Calculated 104 (H) 0 - 100 mg/dL    Non-HDL-Chol (CHOL-HDL) 115 mg/dl       This note was created with voice recognition software  Phonic, grammatical and spelling errors may be present within the note as a result

## 2019-11-26 ENCOUNTER — ONCOLOGY VISIT (OUTPATIENT)
Dept: ONCOLOGY | Facility: CLINIC | Age: 79
End: 2019-11-26
Attending: INTERNAL MEDICINE
Payer: COMMERCIAL

## 2019-11-26 ENCOUNTER — HOSPITAL ENCOUNTER (OUTPATIENT)
Facility: CLINIC | Age: 79
Setting detail: SPECIMEN
Discharge: HOME | End: 2019-11-26
Attending: INTERNAL MEDICINE | Admitting: INTERNAL MEDICINE
Payer: COMMERCIAL

## 2019-11-26 DIAGNOSIS — C91.10 CLL (CHRONIC LYMPHOCYTIC LEUKEMIA) (H): ICD-10-CM

## 2019-11-26 LAB
ANISOCYTOSIS BLD QL SMEAR: SLIGHT
BASOPHILS # BLD AUTO: 0.8 10E9/L (ref 0–0.2)
BASOPHILS NFR BLD AUTO: 1 %
DACRYOCYTES BLD QL SMEAR: SLIGHT
DIFFERENTIAL METHOD BLD: ABNORMAL
ELLIPTOCYTES BLD QL SMEAR: SLIGHT
EOSINOPHIL # BLD AUTO: 0 10E9/L (ref 0–0.7)
EOSINOPHIL NFR BLD AUTO: 0 %
ERYTHROCYTE [DISTWIDTH] IN BLOOD BY AUTOMATED COUNT: 16.7 % (ref 10–15)
HCT VFR BLD AUTO: 28.9 % (ref 40–53)
HGB BLD-MCNC: 9.1 G/DL (ref 13.3–17.7)
LYMPHOCYTES # BLD AUTO: 74 10E9/L (ref 0.8–5.3)
LYMPHOCYTES NFR BLD AUTO: 94 %
MACROCYTES BLD QL SMEAR: PRESENT
MCH RBC QN AUTO: 35 PG (ref 26.5–33)
MCHC RBC AUTO-ENTMCNC: 31.5 G/DL (ref 31.5–36.5)
MCV RBC AUTO: 111 FL (ref 78–100)
MICROCYTES BLD QL SMEAR: PRESENT
MONOCYTES # BLD AUTO: 0 10E9/L (ref 0–1.3)
MONOCYTES NFR BLD AUTO: 0 %
NEUTROPHILS # BLD AUTO: 3.9 10E9/L (ref 1.6–8.3)
NEUTROPHILS NFR BLD AUTO: 5 %
PLATELET # BLD AUTO: 100 10E9/L (ref 150–450)
PLATELET # BLD EST: ABNORMAL 10*3/UL
RBC # BLD AUTO: 2.6 10E12/L (ref 4.4–5.9)
VARIANT LYMPHS BLD QL SMEAR: PRESENT
WBC # BLD AUTO: 78.7 10E9/L (ref 4–11)

## 2019-11-26 PROCEDURE — 36415 COLL VENOUS BLD VENIPUNCTURE: CPT

## 2019-11-26 PROCEDURE — 85025 COMPLETE CBC W/AUTO DIFF WBC: CPT | Performed by: INTERNAL MEDICINE

## 2019-11-26 NOTE — PROGRESS NOTES
Medical Assistant Note:  Austin Garza presents today for blood draw    Patient seen by provider today: No.   present during visit today: Not Applicable.    Concerns: No Concerns.    Procedure:  Lab draw site: right antecub, Needle type:butterfly Gauge: 23.    Post Assessment:  Labs drawn without difficulty: yes      Discharge Plan:  Departure Mode: Ambulatory.    Face to Face Time: 10 minutes     Erica Blevins CMA, CMA

## 2019-11-26 NOTE — LETTER
11/26/2019         RE: Austin Garza  [address] [place] [address]        Dear Colleague,    Thank you for referring your patient, Austin Garza, to the [facility]. Please see a copy of my visit note below.    Medical Assistant Note:  Austin Garza presents today for blood draw    Patient seen by provider today: No.   present during visit today: Not Applicable.    Concerns: No Concerns.    Procedure:  Lab draw site: right antecub, Needle type:butterfly Gauge: 23.    Post Assessment:  Labs drawn without difficulty: yes      Discharge Plan:  Departure Mode: Ambulatory.    Face to Face Time: 10 minutes     Erica Blevins CMA, JERI              Again, thank you for allowing me to participate in the care of your patient.        Sincerely,        [facility] Oncology Nurse

## 2019-12-02 ENCOUNTER — ONCOLOGY VISIT (OUTPATIENT)
Dept: ONCOLOGY | Facility: CLINIC | Age: 79
End: 2019-12-02
Attending: INTERNAL MEDICINE
Payer: COMMERCIAL

## 2019-12-02 VITALS
SYSTOLIC BLOOD PRESSURE: 142 MMHG | TEMPERATURE: 97 F | BODY MASS INDEX: 30.72 KG/M2 | WEIGHT: 208 LBS | DIASTOLIC BLOOD PRESSURE: 85 MMHG | OXYGEN SATURATION: 98 % | HEART RATE: 83 BPM | RESPIRATION RATE: 16 BRPM

## 2019-12-02 DIAGNOSIS — C44.329 SQUAMOUS CELL CANCER OF SKIN OF JAWLINE: ICD-10-CM

## 2019-12-02 DIAGNOSIS — C91.10 CLL (CHRONIC LYMPHOCYTIC LEUKEMIA) (H): Primary | ICD-10-CM

## 2019-12-02 DIAGNOSIS — D72.829 LEUKOCYTOSIS, UNSPECIFIED TYPE: ICD-10-CM

## 2019-12-02 PROCEDURE — 99214 OFFICE O/P EST MOD 30 MIN: CPT | Performed by: INTERNAL MEDICINE

## 2019-12-02 PROCEDURE — G0463 HOSPITAL OUTPT CLINIC VISIT: HCPCS

## 2019-12-02 ASSESSMENT — PAIN SCALES - GENERAL: PAINLEVEL: NO PAIN (0)

## 2019-12-02 NOTE — LETTER
12/2/2019         RE: Austin Garza  [address] [place] [address]        Dear Colleague,    Thank you for referring your patient, Austin Garza, to the [facility]. Please see a copy of my visit note below.    [facility]    Hematology/Oncology Established Patient Note      Today's Date: 9/26/19    Reason for Follow-up: CLL      HISTORY OF PRESENT ILLNESS: Austin Garza is a 79 year old male with PMHx of DMII, HTN who presented with leukocytosis.  In November 2017, he was found to have elevated WBC of 61.7K, hemoglobin of 10.4, and platelet of 115K.  There were no other CBC results available between 2010 and 2017.  Prior to 2010, he had normal CBC, with normal to mild anemia, and mild thrombocytopenia.   He was asymptomatic.    He underwent bone marrow biopsy on 11/21/17, which was consistent with chronic lymphocytic leukemia/small lymphocytic lymphoma, with 13% ringed sideroblasts identified.  The presence of increased numbers of ringed sideroblasts raises the possibility of an associated myelodysplastic syndrome.  Dysplastic changes are not identified though.  Many cases exhibiting ringed sideroblasts are metabolic origin, without significant risk of progression to acute leukemia, and these typically do not show dysplastic morphology as is the case with this specimen.  15% ringed sideroblasts are required for a diagnosis for RARS, which this specimen does not meet.  Flow cytometry is also consistent with CLL/SLL.  Cytogenetics show two (10%) of the metaphase cells comprise a clone characterized by a deletion within the proximal long arm of a chromosome 13 as the sole karyotypic abnormality.  Three (15%) metaphases had loss of the Y chromosome as the sole abnormality.    CT scan on 11/29/17 shows mildly enlarged left axillary lymph node and periaortic lymph nodes in the retroperitoneum of the abdomen.  Spleen is also enlarged.    On his staging CT scan for  CLL, he was incidentally found to have a large infrarenal abdominal aortic aneurysm, measuring 7.6 cm.  He was seen by Dr. Thomas, and he underwent EVAR on 12/4/17.       INTERIM HISTORY: Austin is here for follow-up today.  He is feeling well.  He completed day 30 of planned 30 days of radiation on 10/25/19.  He has recovered from side effects of radiation. He has no concerns at this visit.        REVIEW OF SYSTEMS:   14 point ROS was reviewed and is negative other than as noted above in HPI.       HOME MEDICATIONS:  Current Outpatient Medications   Medication Sig     aspirin (ASA) 81 MG tablet Take 1 tablet (81 mg) by mouth every evening     atorvastatin (LIPITOR) 20 MG tablet Take 1 tablet (20 mg) by mouth At Bedtime     clopidogrel (PLAVIX) 75 MG tablet TAKE 1 TABLET BY MOUTH EVERY DAY     fluticasone (FLONASE) 50 MCG/ACT nasal spray Spray 2 sprays into both nostrils 2 times daily     glimepiride (AMARYL) 1 MG tablet TAKE 1 TABLET BY MOUTH EVERY MORNING BEFORE BREAKFAST     losartan-hydrochlorothiazide (HYZAAR) 50-12.5 MG tablet TAKE 1 TABLET BY MOUTH EVERY DAY     metFORMIN (GLUCOPHAGE) 1000 MG tablet Take 1 tablet (1,000 mg) by mouth 2 times daily (with meals)     Multiple Vitamins-Minerals (CENTRUM SILVER) per tablet Take 1 tablet by mouth daily     No current facility-administered medications for this visit.           ALLERGIES:  Allergies   Allergen Reactions     Ace Inhibitors      cough         PAST MEDICAL HISTORY:  Past Medical History:   Diagnosis Date     AAA (abdominal aortic aneurysm)      BCC (basal cell carcinoma of skin) - face      CLL (chronic lymphocytic leukemia)      Diaphragmatic hernia      Diverticulitis of colon      Esophageal reflux      Essential hypertension      Hyperlipidemia      Irritable bowel syndrome      Macular degeneration (senile) of retina      Squamous Cell Carcinoma, Back 1988     Type 2 diabetes mellitus          PAST SURGICAL HISTORY:  Past Surgical History:    Procedure Laterality Date     APPENDECTOMY OPEN       BONE MARROW BIOPSY, BONE SPECIMEN, NEEDLE/TROCAR N/A 2017    Procedure: BIOPSY BONE MARROW;  BONE MARROW BIOPSY;  Surgeon: Shady Garcia MD;  Location: [facility]     COLONOSCOPY       ENDOVASCULAR REPAIR ANEURYSM ABDOMINAL AORTA N/A 2017    Procedure: ENDOVASCULAR REPAIR ANEURYSM ABDOMINAL AORTA;  ENDOVASCULAR REPAIR ANEURYSM ABDOMINAL AORTA;  Surgeon: Milton Cazares MD;  Location: [facility]     Fistulotomy with marsupialization for repair of fisture in ano       IR ABDOMINAL AORTOGRAM  3/11/2019     IR VISCERAL EMBOLIZATION  2019     Multiple skin tags - resection       Squamous cell skin cancer resection - back  -         SOCIAL HISTORY:  He quit smoking in .  He drinks 1-3 glasses of wine a night with dinner.  He is retired, and used to work as a .  He lives with his wife in [place].  His cousin had lung cancer and  around age 69.  Otherwise, he denies family history of cancer.        FAMILY HISTORY:  Family History   Problem Relation Age of Onset     Cerebrovascular Disease Father         x 2     Hypertension Mother      C.A.D. Mother      Cancer Mother         skin     Eye Disorder Mother         glaucoma     Prostate Cancer No family hx of      Cancer - colorectal No family hx of      Glaucoma No family hx of      Macular Degeneration No family hx of          PHYSICAL EXAM:  Vital signs:  BP (!) 142/85   Pulse 83   Temp 97  F (36.1  C) (Tympanic)   Resp 16   Wt 94.3 kg (208 lb)   SpO2 98%   BMI 30.72 kg/m      GENERAL/CONSTITUTIONAL: No acute distress.  EYES: No scleral icterus.  NEUROLOGIC: Alert, oriented, answers questions appropriately.  INTEGUMENTARY: No jaundice. S/p excision at left lateral neck and left jaw.      LABS:  Recent Labs   Lab Test 19  0441 19  0650 19  1429 19  0700 19  0949 18  0858    141 141  --  142 142   POTASSIUM 4.9 4.7  4.4  --  4.2 4.4   CHLORIDE 106 109 107  --  108 106   CO2 26 26 27  --  27 27   ANIONGAP 6 6 7  --  7 9   BUN 26 24 28  --  25 25   CR 1.36* 1.41* 1.54* 1.38* 1.43* 1.34*   * 150* 122*  --  156* 163*   MAGDALENO 8.9 8.1* 9.4  --  8.9 8.9     No results for input(s): MAG, PHOS in the last 26610 hours.  Recent Labs   Lab Test 11/26/19  0908 10/22/19  1005 09/24/19  0954 08/05/19  1143 07/09/19  0829   WBC 78.7* 65.8* 84.6* 68.1* 75.1*   HGB 9.1* 9.0* 9.0* 9.6* 10.1*   * 88* 102* 99* 105*   * 112* 108* 104* 106*   NEUTROPHIL 5.0 6.0 7.0 15.0 6.0     Recent Labs   Lab Test 03/06/19  0949 09/11/18  0858 08/22/18  0839 05/31/18  0811   BILITOTAL 0.5 0.7 0.5 0.4   ALKPHOS 49 57 52 52   ALT 28 24 30 22   AST 22 20 17 20   ALBUMIN 4.3 4.2 3.9 4.0   LDH  --  204 231* 242*     TSH   Date Value Ref Range Status   11/16/2017 2.63 0.40 - 4.00 mU/L Final   11/17/2016 3.07 0.40 - 4.00 mU/L Final   08/05/2014 2.32 0.40 - 4.00 mU/L Final     Comment:     Effective 7/30/2014, the reference range for this assay has changed to reflect   new instrumentation/methodology.       No results for input(s): CEA in the last 07362 hours.  Results for orders placed or performed during the hospital encounter of 10/15/19   CTA Abdomen Pelvis with Contrast    Narrative    PROCEDURE:  CTA of the abdomen and pelvis    DATE OF PROCEDURE:  10/15/2019 10:12 AM    DOSE:  mGy-cm: 674 mGy-cm    CLINICAL HISTORY/INDICATION:  s/p Embolization of type II endoleak done on 5/13/2019 with DR Gauthier   6 mo f/u; Abdominal aortic aneurysm (AAA) without rupture (H)    COMPARISON:  Angiogram 5/13/2019, CT 4/18/2019    TECHNIQUE:  CT angiogram of the abdomen and pelvis was performed prior to and  following the administration of intravenous contrast. Coronal and  sagittal reformats were performed. Radiation dose for this scan was  reduced using automated exposure control, adjustment of the mA and/or  kV according to patient size, or iterative  reconstruction technique.  Multi planar and 3-D reformatted images were created on a separate  workstation.    FINDINGS:  Vasculature: Interval Barry embolization of type II endoleak. Extensive  beam hardening/streak artifact from the [place] obscures detail within  the aneurysm sac. Barry can be seen within the proximal inferior  mesenteric artery leading up to the aneurysm sac additional Barry is  present within left-sided lumbar arteries. Overall excluded aneurysm  sac size measures 6.2 x 7.5 cm, previously 6.9 x 7.6 cm. The celiac  origin and superior mesenteric artery origin and are patent. 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 Bilateral renal arteries appear patent. No evidence of type I  endoleak.     Lower chest:  4 mm right lower lobe pulmonary nodule series 3 image 7 is unchanged  since 11/29/2017. Minimal basilar atelectasis bilaterally, the  visualized lungs are otherwise clear. No pericardial effusion.    Abdomen pelvis:  Evaluation of solid organ parenchyma is limited in the setting of  contrast bolus timing. The liver is noncirrhotic in morphology. No new  hepatic mass. The gallbladder is contracted and contains a punctate  hyperdensity. No pericholecystic abnormality or gallbladder wall  thickening. The spleen, bilateral adrenal glands and pancreas are  unremarkable. Extensive streak artifact obscures detail of the central  abdomen. The large and small bowel are nondilated without evidence of  obstruction. Extensive diverticulosis without surrounding inflammatory  changes. Bilateral fat-containing inguinal hernias and fat-containing  umbilical hernia appears similar to prior exam.      Impression    IMPRESSION:  1.  Patent abdominal aortic endograft.  2.  Interval embolization type II endoleak with decrease in size of  excluded aneurysm sac size 6.2 x 7.5 cm, previously 6.9 x 7.6 cm  however the exact dimensions are difficult to delineate due to  extensive beam  hardening/streak artifact from embolic material. Barry  is seen within the proximal inferior mesenteric artery and left-sided  lumbar arteries.  3.  4 mm right lower lobe pulmonary nodule is stable from 11/29/2017,  given two-year stability compatible with a benign process.  4.  Cholelithiasis.    LEONARD MERRITT MD     Recent Labs   Lab Test 11/26/19  0908 10/22/19  1005 09/24/19  0954 08/05/19  1143 07/09/19  0829   ALYM 74.0* 60.5* 77.8* 54.5* 64.6*         ASSESSMENT/PLAN:  Austin Garza is a 79 year old male with:    1) CLL/SLL: BLACKWOOD stage III, with lymphocytosis, lymphadenopathy, splenomegaly, and anemia.  He has mild thrombocytopenia as well, but is above 100K.  He presented with leukocytosis with WBC of 61.7K, hemoglobin of 10.4, and platelet count of 115K on diagnosis.  Bone marrow biopsy and flow cytometry confirmed CLL/SLL.  CT scan shows mildly enlarged left axillary lymph node and periaortic lymph nodes in the retroperitoneum of the abdomen, with enlarged spleen.      CBC from 11/26/19 reviewed with Austin.  WBC is 78.7K with ALC of 74k, around where his baseline levels have been.  Platelet count and hemoglobin remain in his baseline range.        Since his lymphocyte counts are fairly stable, hemoglobin and platelet count are also stable, and his symptoms have not changed, will continue to monitor closely for now.    -he asked about transfusion parameter.  We usually transfuse for <7, but previously, he was symptomatic with fatigue at around 8.1.  He got a blood transfusion and felt much better.  We agreed on transfusion parameter of hemoglobin <8.    -repeat CBC check in 1 month  -RTC in 3 months with CBC, CMP and LDH to see Dr. Perry again.     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 He is undergoing radiation at [facility] with planned 60 Gy x 30 fractions.  He received day 9 of planned 30 days of radiation  today.  -He has completed radiation for squamous cell cancer of the skin from left cheek/jawline.   - He notes that he does not need any follow up for this.     3) Abdominal aortic aneurysm: measures up to 7.6 cm on CT scan, incidentally found.  He underwent EVAR on 12/4/17.  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  He is on Plavix now.  On 5/13/19, he underwent and percutaneous aneurysm sac puncture and endo leak embolization by IR on 5/13/19.  -follow-up with vascular surgery and IR    4) Diabetes, hypertension:  -following with PCP        Again, thank you for allowing me to participate in the care of your patient.        Sincerely,        Teodoro Sierra MD

## 2019-12-02 NOTE — NURSING NOTE
"Oncology Rooming Note    December 2, 2019 11:31 AM   Austin Garza is a 79 year old male who presents for:    Chief Complaint   Patient presents with     Oncology Clinic Visit     CLL (chronic lymphocytic leukemia)     Initial Vitals: BP (!) 142/85   Pulse 83   Temp 97  F (36.1  C) (Tympanic)   Resp 16   Wt 94.3 kg (208 lb)   SpO2 98%   BMI 30.72 kg/m   Estimated body mass index is 30.72 kg/m  as calculated from the following:    Height as of 9/26/19: 1.753 m (5' 9\").    Weight as of this encounter: 94.3 kg (208 lb). Body surface area is 2.14 meters squared.  No Pain (0) Comment: Data Unavailable   No LMP for male patient.  Allergies reviewed: Yes  Medications reviewed: Yes    Medications: Medication refills not needed today.  Pharmacy name entered into "[employer]":    [facility] - [facility] [address]  [facility] - [place] - [address] [facility] - [place], [place] - [address] AT [id number] - [place] - [address] AT [facility] [place]    Clinical concerns: Follow Up       Kimberley Luna CMA              "

## 2019-12-02 NOTE — PROGRESS NOTES
[facility]    Hematology/Oncology Established Patient Note      Today's Date: 9/26/19    Reason for Follow-up: CLL      HISTORY OF PRESENT ILLNESS: Austin Garza is a 79 year old male with PMHx of DMII, HTN who presented with leukocytosis.  In November 2017, he was found to have elevated WBC of 61.7K, hemoglobin of 10.4, and platelet of 115K.  There were no other CBC results available between 2010 and 2017.  Prior to 2010, he had normal CBC, with normal to mild anemia, and mild thrombocytopenia.   He was asymptomatic.    He underwent bone marrow biopsy on 11/21/17, which was consistent with chronic lymphocytic leukemia/small lymphocytic lymphoma, with 13% ringed sideroblasts identified.  The presence of increased numbers of ringed sideroblasts raises the possibility of an associated myelodysplastic syndrome.  Dysplastic changes are not identified though.  Many cases exhibiting ringed sideroblasts are metabolic origin, without significant risk of progression to acute leukemia, and these typically do not show dysplastic morphology as is the case with this specimen.  15% ringed sideroblasts are required for a diagnosis for RARS, which this specimen does not meet.  Flow cytometry is also consistent with CLL/SLL.  Cytogenetics show two (10%) of the metaphase cells comprise a clone characterized by a deletion within the proximal long arm of a chromosome 13 as the sole karyotypic abnormality.  Three (15%) metaphases had loss of the Y chromosome as the sole abnormality.    CT scan on 11/29/17 shows mildly enlarged left axillary lymph node and periaortic lymph nodes in the retroperitoneum of the abdomen.  Spleen is also enlarged.    On his staging CT scan for CLL, he was incidentally found to have a large infrarenal abdominal aortic aneurysm, measuring 7.6 cm.  He was seen by Dr. Thomas, and he underwent EVAR on 12/4/17.       INTERIM HISTORY: Austin is here for follow-up today.  He is feeling  well.  He completed day 30 of planned 30 days of radiation on 10/25/19.  He has recovered from side effects of radiation. He has no concerns at this visit.        REVIEW OF SYSTEMS:   14 point ROS was reviewed and is negative other than as noted above in HPI.       HOME MEDICATIONS:  Current Outpatient Medications   Medication Sig     aspirin (ASA) 81 MG tablet Take 1 tablet (81 mg) by mouth every evening     atorvastatin (LIPITOR) 20 MG tablet Take 1 tablet (20 mg) by mouth At Bedtime     clopidogrel (PLAVIX) 75 MG tablet TAKE 1 TABLET BY MOUTH EVERY DAY     fluticasone (FLONASE) 50 MCG/ACT nasal spray Spray 2 sprays into both nostrils 2 times daily     glimepiride (AMARYL) 1 MG tablet TAKE 1 TABLET BY MOUTH EVERY MORNING BEFORE BREAKFAST     losartan-hydrochlorothiazide (HYZAAR) 50-12.5 MG tablet TAKE 1 TABLET BY MOUTH EVERY DAY     metFORMIN (GLUCOPHAGE) 1000 MG tablet Take 1 tablet (1,000 mg) by mouth 2 times daily (with meals)     Multiple Vitamins-Minerals (CENTRUM SILVER) per tablet Take 1 tablet by mouth daily     No current facility-administered medications for this visit.           ALLERGIES:  Allergies   Allergen Reactions     Ace Inhibitors      cough         PAST MEDICAL HISTORY:  Past Medical History:   Diagnosis Date     AAA (abdominal aortic aneurysm)      BCC (basal cell carcinoma of skin) - face      CLL (chronic lymphocytic leukemia)      Diaphragmatic hernia      Diverticulitis of colon      Esophageal reflux      Essential hypertension      Hyperlipidemia      Irritable bowel syndrome      Macular degeneration (senile) of retina      Squamous Cell Carcinoma, Back 1988     Type 2 diabetes mellitus          PAST SURGICAL HISTORY:  Past Surgical History:   Procedure Laterality Date     APPENDECTOMY OPEN  1966     BONE MARROW BIOPSY, BONE SPECIMEN, NEEDLE/TROCAR N/A 11/21/2017    Procedure: BIOPSY BONE MARROW;  BONE MARROW BIOPSY;  Surgeon: Shady Garcia MD;  Location:  GI      COLONOSCOPY       ENDOVASCULAR REPAIR ANEURYSM ABDOMINAL AORTA N/A 2017    Procedure: ENDOVASCULAR REPAIR ANEURYSM ABDOMINAL AORTA;  ENDOVASCULAR REPAIR ANEURYSM ABDOMINAL AORTA;  Surgeon: Milton Cazares MD;  Location: [facility] OR     Fistulotomy with marsupialization for repair of fisture in ano       IR ABDOMINAL AORTOGRAM  3/11/2019     IR VISCERAL EMBOLIZATION  2019     Multiple skin tags - resection  1998     Squamous cell skin cancer resection - back  -         SOCIAL HISTORY:  He quit smoking in .  He drinks 1-3 glasses of wine a night with dinner.  He is retired, and used to work as a .  He lives with his wife in [place].  His cousin had lung cancer and  around age 69.  Otherwise, he denies family history of cancer.        FAMILY HISTORY:  Family History   Problem Relation Age of Onset     Cerebrovascular Disease Father         x 2     Hypertension Mother      C.A.D. Mother      Cancer Mother         skin     Eye Disorder Mother         glaucoma     Prostate Cancer No family hx of      Cancer - colorectal No family hx of      Glaucoma No family hx of      Macular Degeneration No family hx of          PHYSICAL EXAM:  Vital signs:  BP (!) 142/85   Pulse 83   Temp 97  F (36.1  C) (Tympanic)   Resp 16   Wt 94.3 kg (208 lb)   SpO2 98%   BMI 30.72 kg/m     GENERAL/CONSTITUTIONAL: No acute distress.  EYES: No scleral icterus.  NEUROLOGIC: Alert, oriented, answers questions appropriately.  INTEGUMENTARY: No jaundice. S/p excision at left lateral neck and left jaw.      LABS:  Recent Labs   Lab Test 19  0441 19  0650 19  1429 19  0700 19  0949 18  0858    141 141  --  142 142   POTASSIUM 4.9 4.7 4.4  --  4.2 4.4   CHLORIDE 106 109 107  --  108 106   CO2 26 26 27  --  27 27   ANIONGAP 6 6 7  --  7 9   BUN 26 24 28  --  25 25   CR 1.36* 1.41* 1.54* 1.38* 1.43* 1.34*   * 150* 122*  --  156* 163*   MAGDALENO 8.9 8.1* 9.4  --  8.9 8.9      No results for input(s): MAG, PHOS in the last 05009 hours.  Recent Labs   Lab Test 11/26/19  0908 10/22/19  1005 09/24/19  0954 08/05/19  1143 07/09/19  0829   WBC 78.7* 65.8* 84.6* 68.1* 75.1*   HGB 9.1* 9.0* 9.0* 9.6* 10.1*   * 88* 102* 99* 105*   * 112* 108* 104* 106*   NEUTROPHIL 5.0 6.0 7.0 15.0 6.0     Recent Labs   Lab Test 03/06/19  0949 09/11/18  0858 08/22/18  0839 05/31/18  0811   BILITOTAL 0.5 0.7 0.5 0.4   ALKPHOS 49 57 52 52   ALT 28 24 30 22   AST 22 20 17 20   ALBUMIN 4.3 4.2 3.9 4.0   LDH  --  204 231* 242*     TSH   Date Value Ref Range Status   11/16/2017 2.63 0.40 - 4.00 mU/L Final   11/17/2016 3.07 0.40 - 4.00 mU/L Final   08/05/2014 2.32 0.40 - 4.00 mU/L Final     Comment:     Effective 7/30/2014, the reference range for this assay has changed to reflect   new instrumentation/methodology.       No results for input(s): CEA in the last 30902 hours.  Results for orders placed or performed during the hospital encounter of 10/15/19   CTA Abdomen Pelvis with Contrast    Narrative    PROCEDURE:  CTA of the abdomen and pelvis    DATE OF PROCEDURE:  10/15/2019 10:12 AM    DOSE:  mGy-cm: 674 mGy-cm    CLINICAL HISTORY/INDICATION:  s/p Embolization of type II endoleak done on 5/13/2019 with DR Gauthier   6 mo f/u; Abdominal aortic aneurysm (AAA) without rupture (H)    COMPARISON:  Angiogram 5/13/2019, CT 4/18/2019    TECHNIQUE:  CT angiogram of the abdomen and pelvis was performed prior to and  following the administration of intravenous contrast. Coronal and  sagittal reformats were performed. Radiation dose for this scan was  reduced using automated exposure control, adjustment of the mA and/or  kV according to patient size, or iterative reconstruction technique.  Multi planar and 3-D reformatted images were created on a separate  workstation.    FINDINGS:  Vasculature: Interval Wilburn embolization of type II endoleak. Extensive  beam hardening/streak artifact from the Wilburn obscures  detail within  the aneurysm sac. Barry can be seen within the proximal inferior  mesenteric artery leading up to the aneurysm sac additional Dresher is  present within left-sided lumbar arteries. Overall excluded aneurysm  sac size measures 6.2 x 7.5 cm, previously 6.9 x 7.6 cm. The celiac  origin and superior mesenteric artery origin and are patent. 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 Bilateral renal arteries appear patent. No evidence of type I  endoleak.     Lower chest:  4 mm right lower lobe pulmonary nodule series 3 image 7 is unchanged  since 11/29/2017. Minimal basilar atelectasis bilaterally, the  visualized lungs are otherwise clear. No pericardial effusion.    Abdomen pelvis:  Evaluation of solid organ parenchyma is limited in the setting of  contrast bolus timing. The liver is noncirrhotic in morphology. No new  hepatic mass. The gallbladder is contracted and contains a punctate  hyperdensity. No pericholecystic abnormality or gallbladder wall  thickening. The spleen, bilateral adrenal glands and pancreas are  unremarkable. Extensive streak artifact obscures detail of the central  abdomen. The large and small bowel are nondilated without evidence of  obstruction. Extensive diverticulosis without surrounding inflammatory  changes. Bilateral fat-containing inguinal hernias and fat-containing  umbilical hernia appears similar to prior exam.      Impression    IMPRESSION:  1.  Patent abdominal aortic endograft.  2.  Interval embolization type II endoleak with decrease in size of  excluded aneurysm sac size 6.2 x 7.5 cm, previously 6.9 x 7.6 cm  however the exact dimensions are difficult to delineate due to  extensive beam hardening/streak artifact from embolic material. Dresher  is seen within the proximal inferior mesenteric artery and left-sided  lumbar arteries.  3.  4 mm right lower lobe pulmonary nodule is stable from 11/29/2017,  given two-year stability  compatible with a benign process.  4.  Cholelithiasis.    LEONARD MERRITT MD     Recent Labs   Lab Test 11/26/19  0908 10/22/19  1005 09/24/19  0954 08/05/19  1143 07/09/19  0829   ALYM 74.0* 60.5* 77.8* 54.5* 64.6*         ASSESSMENT/PLAN:  Austin Garza is a 79 year old male with:    1) CLL/SLL: BLACKWOOD stage III, with lymphocytosis, lymphadenopathy, splenomegaly, and anemia.  He has mild thrombocytopenia as well, but is above 100K.  He presented with leukocytosis with WBC of 61.7K, hemoglobin of 10.4, and platelet count of 115K on diagnosis.  Bone marrow biopsy and flow cytometry confirmed CLL/SLL.  CT scan shows mildly enlarged left axillary lymph node and periaortic lymph nodes in the retroperitoneum of the abdomen, with enlarged spleen.      CBC from 11/26/19 reviewed with Austin.  WBC is 78.7K with ALC of 74k, around where his baseline levels have been.  Platelet count and hemoglobin remain in his baseline range.        Since his lymphocyte counts are fairly stable, hemoglobin and platelet count are also stable, and his symptoms have not changed, will continue to monitor closely for now.    -he asked about transfusion parameter.  We usually transfuse for <7, but previously, he was symptomatic with fatigue at around 8.1.  He got a blood transfusion and felt much better.  We agreed on transfusion parameter of hemoglobin <8.    -repeat CBC check in 1 month  -RTC in 3 months with CBC, CMP and LDH to see Dr. Perry again.     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 He is undergoing radiation at [facility] with planned 60 Gy x 30 fractions.  He received day 9 of planned 30 days of radiation today.  -He has completed radiation for squamous cell cancer of the skin from left cheek/jawline.   - He notes that he does not need any follow up for this.     3) Abdominal aortic aneurysm: measures up to 7.6 cm on CT scan, incidentally found.   He underwent EVAR on 12/4/17.  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  He is on Plavix now.  On 5/13/19, he underwent and percutaneous aneurysm sac puncture and endo leak embolization by IR on 5/13/19.  -follow-up with vascular surgery and IR    4) Diabetes, hypertension:  -following with PCP

## 2019-12-06 DIAGNOSIS — E11.9 TYPE 2 DIABETES MELLITUS WITHOUT COMPLICATION, WITHOUT LONG-TERM CURRENT USE OF INSULIN (H): ICD-10-CM

## 2019-12-08 NOTE — TELEPHONE ENCOUNTER
Requested Prescriptions   Pending Prescriptions Disp Refills     metFORMIN (GLUCOPHAGE) 1000 MG tablet [Pharmacy Med Name: METFORMIN HCL 1,000 MG TABLET]    Last Written Prescription Date:  1/18/19  Last Fill Quantity: 180 tablet,  # refills: 0   Last office visit: 5/1/2019 with prescribing provider:  Sonja Moore Office Visit:   Next 5 appointments (look out 90 days)    Dec 17, 2019  1:45 PM CST  (Arrive by 1:25 PM)  Office Visit with GISELL Marsh CNP  [facility] ([facility]) [address]  [address]  [facility] [address]  [phone]          180 tablet 2     Sig: TAKE 1 TABLET BY MOUTH TWICE A DAY WITH FOOD       Biguanide Agents Failed - 12/7/2019  9:37 AM        Failed - Blood pressure less than 140/90 in past 6 months     BP Readings from Last 3 Encounters:   12/02/19 (!) 142/85   09/26/19 (!) 143/83   08/06/19 127/83                 Failed - Patient has had a Microalbumin in the past 15 mos.     Recent Labs   Lab Test 11/16/17  0805   MICROL 22   UMALCR 9.19             Failed - Patient has documented A1c within the specified period of time.     If HgbA1C is 8 or greater, it needs to be on file within the past 3 months.  If less than 8, must be on file within the past 6 months.     Recent Labs   Lab Test 03/11/19  0700   A1C 5.9*             Passed - Patient has documented LDL within the past 12 mos.     Recent Labs   Lab Test 03/11/19  0700   LDL 52             Passed - Patient is age 10 or older        Passed - Patient's CR is NOT>1.4 OR Patient's EGFR is NOT<45 within past 12 mos.     Recent Labs   Lab Test 10/15/19  0958   GFRESTIMATED 42*   GFRESTBLACK 51*       Recent Labs   Lab Test 05/29/19  0441   CR 1.36*             Passed - Patient does NOT have a diagnosis of CHF.        Passed - Medication is active on med list        Passed - Recent (6 mo) or future (30 days) visit within the authorizing provider's specialty     Patient had office  "visit in the last 6 months or has a visit in the next 30 days with authorizing provider or within the authorizing provider's specialty.  See \"Patient Info\" tab in inbasket, or \"Choose Columns\" in Meds & Orders section of the refill encounter.            "

## 2019-12-09 DIAGNOSIS — E11.9 TYPE 2 DIABETES MELLITUS WITHOUT COMPLICATION, WITHOUT LONG-TERM CURRENT USE OF INSULIN (H): ICD-10-CM

## 2019-12-09 RX ORDER — ATORVASTATIN CALCIUM 20 MG/1
TABLET, FILM COATED ORAL
Qty: 90 TABLET | Refills: 0 | Status: SHIPPED | OUTPATIENT
Start: 2019-12-09 | End: 2019-12-16

## 2019-12-09 NOTE — TELEPHONE ENCOUNTER
"Prescription approved per [facility], [facility] or [facility] refill protocol.  Paula SUMMERS - Registered Nurse  [facility]  Acute and Diagnostic Services        Requested Prescriptions   Pending Prescriptions Disp Refills     atorvastatin (LIPITOR) 20 MG tablet [Pharmacy Med Name: ATORVASTATIN 20 MG TABLET] 90 tablet 2     Sig: TAKE 1 TABLET BY MOUTH AT BEDTIME       Statins Protocol Passed - 12/9/2019  2:15 AM        Passed - LDL on file in past 12 months     Recent Labs   Lab Test 03/11/19  0700   LDL 52             Passed - No abnormal creatine kinase in past 12 months     No lab results found.             Passed - Recent (12 mo) or future (30 days) visit within the authorizing provider's specialty     Patient has had an office visit with the authorizing provider or a provider within the authorizing providers department within the previous 12 mos or has a future within next 30 days. See \"Patient Info\" tab in inbasket, or \"Choose Columns\" in Meds & Orders section of the refill encounter.              Passed - Medication is active on med list        Passed - Patient is age 18 or older          "

## 2019-12-16 ENCOUNTER — TELEPHONE (OUTPATIENT)
Dept: PEDIATRICS | Facility: CLINIC | Age: 79
End: 2019-12-16

## 2019-12-16 DIAGNOSIS — E11.9 TYPE 2 DIABETES MELLITUS WITHOUT COMPLICATION, WITHOUT LONG-TERM CURRENT USE OF INSULIN (H): Primary | ICD-10-CM

## 2019-12-16 NOTE — PROGRESS NOTES
Subjective     Austin Garza is a 79 year old male who presents to clinic today for the following health issues:    Loose stool since about May when starting Plavix. No blood in the stool. Sometimes the stool is normal. Has been taking metformin much longer than this problem. No abdominal pain, fever.    Trouble urinating. Ongoing for about 5-10 years, possibly slightly worse. Trouble starting stream and stream sometimes stops. Dribbling at the end. No hematuria, hematospermia, etc. No pelvic pain.  CTA abdomen/pelvis recently has been normal, which was done for AAA.        History of Present Illness        Diabetes:   He presents for follow up of diabetes.  He is not checking blood glucose. He has no concerns regarding his diabetes at this time.  He is having burning in feet. The patient has had a diabetic eye exam in the last 12 months. Eye exam performed on 2feb19.    [facility]   Last eye exam downstairs - August 31 2018 - info above incorrect    Sometimes feet get hot. Not necessarily numb, tingling, or burning  Musculoskeletal problem/pain  Shoulder pain  Asking about cortisone shot      Duration: ongoing problem - years    Description  Location: right shoulder    Intensity:  moderate    Accompanying signs and symptoms: none    History  Previous similar problem: YES  Previous evaluation:  States he saw orthopedist in [facility] - has been doing PT exercises - was told he has torn rotator cuff    Precipitating or alleviating factors:  Trauma or overuse: no   Aggravating factors include: cannot reach behind on right side (back seat of car)    Therapies tried and outcome: nothing, chiropractor and physical therapy    Hyperlipidemia Follow-Up      Are you regularly taking any medication or supplement to lower your cholesterol?   Yes- atorvastatin    Are you having muscle aches or other side effects that you think could be caused by your cholesterol lowering medication?  No    Hypertension  "Follow-up      Do you check your blood pressure regularly outside of the clinic? Yes - occasionally - not routine    Are you following a low salt diet? Yes - no added    Are your blood pressures ever more than 140 on the top number (systolic) OR more   than 90 on the bottom number (diastolic), for example 140/90? No    BP Readings from Last 2 Encounters:   12/17/19 138/76   12/02/19 (!) 142/85     Hemoglobin A1C (%)   Date Value   12/17/2019 6.3 (H)   03/11/2019 5.9 (H)     LDL Cholesterol Calculated (mg/dL)   Date Value   03/11/2019 52   10/08/2018 55     ROS: const/msk/neuro/endo/gi/gu otherwise negative     OBJECTIVE:  /76 (BP Location: Right arm, Cuff Size: Adult Large)   Pulse 91   Temp 97.2  F (36.2  C) (Tympanic)   Resp 18   Ht 1.753 m (5' 9\")   Wt 94.7 kg (208 lb 12.8 oz)   SpO2 97%   BMI 30.83 kg/m    CONSTITUTIONAL: Alert, well-nourished, well-groomed, NAD  RESP: Lungs CTA. No wheeze, rhonchi, rales.  CV: HRRR S1 S2 No MRG. No peripheral edema  GI: Abdomen flat. BS x 4. No TTP. No HSM or masses. No CVAT  DIABETIC FOOT EXAM: No skin changes. Nails yellowed] Pulses +2 bilaterally and feet warm. No lesions. Sensation intact with monofilament exam.      ASSESSMENT/PLAN:  (E11.9) Type 2 diabetes mellitus without complication, without long-term current use of insulin (H)  (primary encounter diagnosis)  Comment: Well controlled but slightly increased A1c from last visit (5.9 to 6.3). On 1mg Glipizide daily and 2000mg metformin.  Plan: HEMOGLOBIN A1C, FOOT EXAM, **Basic metabolic         panel FUTURE 1yr, atorvastatin (LIPITOR) 20 MG         tablet, glimepiride (AMARYL) 1 MG tablet,         metFORMIN (GLUCOPHAGE) 1000 MG tablet  -BP controlled, but borderline  -A1c controlled  -Aspirin   -Not smoking  -On ARB  -On Statin  -Vascular aware that he is on BOTH aspirin and plavix.  -F/U PCP (Dr. Nguyen) 6 months.     (I71.4) Abdominal aortic aneurysm (AAA) without rupture (H)  Comment: Follows with " vascular.  Plan: aspirin (ASA) 81 MG tablet  -Plavix  -F/U vascular 6 months  -Vascular aware that he is on BOTH aspirin and plavix.    (I10) Hypertension goal BP (blood pressure) < 140/90  Comment: Fair control. Discussed with patient that it is on the high end. I checked with his vascular surgeon, who prefers it to be a bit lower.   Plan: losartan-hydrochlorothiazide (HYZAAR) 50-12.5         MG tablet  -Increase BP meds  -F/U labs and BP in 2 weeks. Monitor for hypotension sx.     (M25.511,  M25.512) Bilateral shoulder pain, unspecified chronicity  Plan: ORTHO  REFERRAL    (R19.5) Loose stools  Comment: As above. Temporally correlated with starting Plavix. Per uptodate, Plavix causes colitis in less than 1% of pts. Pt has no red flags such as daily diarrhea, bloody stools, weight loss, rectal pain, etc. 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 Not temporally related to starting Metformin, which was started years ago.   Plan:   -Monitor sx  -Check TSH  -Start metamucil  -F/U if not resolved in 1 month. At that time could consider further workup.    50 minutes spent with patient, over 1/2 in counseling and coordination of care regarding the above diagnoses    JENNIFER Fajardo-DNP.

## 2019-12-17 ENCOUNTER — TELEPHONE (OUTPATIENT)
Dept: PEDIATRICS | Facility: CLINIC | Age: 79
End: 2019-12-17

## 2019-12-17 ENCOUNTER — OFFICE VISIT (OUTPATIENT)
Dept: PEDIATRICS | Facility: CLINIC | Age: 79
End: 2019-12-17
Payer: COMMERCIAL

## 2019-12-17 VITALS
RESPIRATION RATE: 18 BRPM | OXYGEN SATURATION: 97 % | TEMPERATURE: 97.2 F | SYSTOLIC BLOOD PRESSURE: 138 MMHG | WEIGHT: 208.8 LBS | BODY MASS INDEX: 30.93 KG/M2 | DIASTOLIC BLOOD PRESSURE: 76 MMHG | HEIGHT: 69 IN | HEART RATE: 91 BPM

## 2019-12-17 DIAGNOSIS — R19.7 DIARRHEA, UNSPECIFIED TYPE: ICD-10-CM

## 2019-12-17 DIAGNOSIS — I77.70 ARTERY DISSECTION (H): ICD-10-CM

## 2019-12-17 DIAGNOSIS — R19.5 LOOSE STOOLS: ICD-10-CM

## 2019-12-17 DIAGNOSIS — E11.9 TYPE 2 DIABETES MELLITUS WITHOUT COMPLICATION, WITHOUT LONG-TERM CURRENT USE OF INSULIN (H): Primary | ICD-10-CM

## 2019-12-17 DIAGNOSIS — I71.40 ABDOMINAL AORTIC ANEURYSM (AAA) WITHOUT RUPTURE (H): ICD-10-CM

## 2019-12-17 DIAGNOSIS — R39.11 URINARY HESITANCY: ICD-10-CM

## 2019-12-17 DIAGNOSIS — M25.50 MULTIPLE JOINT PAIN: ICD-10-CM

## 2019-12-17 DIAGNOSIS — I10 HYPERTENSION GOAL BP (BLOOD PRESSURE) < 140/90: ICD-10-CM

## 2019-12-17 DIAGNOSIS — M25.512 BILATERAL SHOULDER PAIN, UNSPECIFIED CHRONICITY: ICD-10-CM

## 2019-12-17 DIAGNOSIS — J06.9 VIRAL URI WITH COUGH: ICD-10-CM

## 2019-12-17 DIAGNOSIS — I10 HYPERTENSION GOAL BP (BLOOD PRESSURE) < 140/90: Primary | ICD-10-CM

## 2019-12-17 DIAGNOSIS — M25.511 BILATERAL SHOULDER PAIN, UNSPECIFIED CHRONICITY: ICD-10-CM

## 2019-12-17 DIAGNOSIS — R97.20 ELEVATED PROSTATE SPECIFIC ANTIGEN (PSA): ICD-10-CM

## 2019-12-17 DIAGNOSIS — Z12.5 ENCOUNTER FOR SCREENING FOR MALIGNANT NEOPLASM OF PROSTATE: ICD-10-CM

## 2019-12-17 LAB — HBA1C MFR BLD: 6.3 % (ref 0–5.6)

## 2019-12-17 PROCEDURE — 84550 ASSAY OF BLOOD/URIC ACID: CPT | Performed by: NURSE PRACTITIONER

## 2019-12-17 PROCEDURE — G0103 PSA SCREENING: HCPCS | Performed by: NURSE PRACTITIONER

## 2019-12-17 PROCEDURE — 99207 C FOOT EXAM  NO CHARGE: CPT | Mod: 25 | Performed by: NURSE PRACTITIONER

## 2019-12-17 PROCEDURE — 84443 ASSAY THYROID STIM HORMONE: CPT | Performed by: NURSE PRACTITIONER

## 2019-12-17 PROCEDURE — 36415 COLL VENOUS BLD VENIPUNCTURE: CPT | Performed by: NURSE PRACTITIONER

## 2019-12-17 PROCEDURE — 99215 OFFICE O/P EST HI 40 MIN: CPT | Performed by: NURSE PRACTITIONER

## 2019-12-17 PROCEDURE — 82043 UR ALBUMIN QUANTITATIVE: CPT | Performed by: NURSE PRACTITIONER

## 2019-12-17 PROCEDURE — 83036 HEMOGLOBIN GLYCOSYLATED A1C: CPT | Performed by: NURSE PRACTITIONER

## 2019-12-17 RX ORDER — LOSARTAN POTASSIUM AND HYDROCHLOROTHIAZIDE 12.5; 5 MG/1; MG/1
1 TABLET ORAL DAILY
Qty: 90 TABLET | Refills: 3 | Status: SHIPPED | OUTPATIENT
Start: 2019-12-17 | End: 2019-12-17

## 2019-12-17 RX ORDER — LOSARTAN POTASSIUM AND HYDROCHLOROTHIAZIDE 25; 100 MG/1; MG/1
1 TABLET ORAL DAILY
Qty: 90 TABLET | Refills: 3 | Status: ON HOLD | OUTPATIENT
Start: 2019-12-17 | End: 2020-08-23

## 2019-12-17 RX ORDER — FLUTICASONE PROPIONATE 50 MCG
2 SPRAY, SUSPENSION (ML) NASAL 2 TIMES DAILY
Qty: 16 G | Refills: 11 | Status: SHIPPED | OUTPATIENT
Start: 2019-12-17 | End: 2019-12-17

## 2019-12-17 RX ORDER — GLIMEPIRIDE 1 MG/1
1 TABLET ORAL
Qty: 90 TABLET | Refills: 3 | Status: SHIPPED | OUTPATIENT
Start: 2019-12-17 | End: 2020-06-03

## 2019-12-17 RX ORDER — CLOPIDOGREL BISULFATE 75 MG/1
75 TABLET ORAL DAILY
Qty: 90 TABLET | Refills: 1 | Status: SHIPPED | OUTPATIENT
Start: 2019-12-17 | End: 2020-05-27

## 2019-12-17 RX ORDER — ATORVASTATIN CALCIUM 20 MG/1
20 TABLET, FILM COATED ORAL AT BEDTIME
Qty: 90 TABLET | Refills: 3 | Status: SHIPPED | OUTPATIENT
Start: 2019-12-17 | End: 2021-01-05

## 2019-12-17 ASSESSMENT — MIFFLIN-ST. JEOR: SCORE: 1662.49

## 2019-12-17 NOTE — PATIENT INSTRUCTIONS
Have eye doc in February send us records    Shingles shot      Shoulder pain:  See orthopedics. They will call you.     Diarrhea:  -Possibly related to Plavix.   -Call me if worsening or if you have any blood in stool, abdominal pain, or frequent loose stool/diarrhea. We would want to do more testing    Prostate:  -We will do PSA blood test at next lab check  -Call me for worsening urinary symptoms.     Diabetes:  -A1c is slightly worse (from 5.0 to6.3) but still excellent  -Avoid excessive sugar/carbs/alcohol  -Recheck lab visit followed by visit with Dr. Nguyen in June. Try to schedule THREE MONTHS ahead of time.

## 2019-12-17 NOTE — TELEPHONE ENCOUNTER
Please let patient know I spoke with vascular.  We think it would be best to have a bit lower BP    Please switch from current BP med to a double dose version. Rx sent.     Please recheck BP in pharmacy and labs in 1-2 weeks.

## 2019-12-17 NOTE — LETTER
[facility]  [address] [facility] [address]  [place] [address]                  [phone]   December 18, 2019    Austin Garza  [address] [place] [address] [place] [address]      Dear Austin,    Here is a summary of your recent test results:    Your thyroid is normal. This is not the cause of the loose stools. Please let me know if you continue to have loose stools after starting the Miralax.     Your uric acid is normal, but on the high end of normal. Please discuss these various joint swelling/inflammation/pain issues with orthopedics when you discuss your shoulder with them.     Your PSA is mildly high. As we discussed, it is very common to have false positives. I'm not terribly concerned given that your symptoms have been going on so long. Also, the PSA is only mildly elevated. I'd like you to schedule a lab visit to recheck it in 3 months. Could you put that on your calendar?     You do have some protein in your urine. This will likely come down as your blood pressure comes down.     Your test results are enclosed.      Please contact me if you have any questions.           Thank you very much for choosing [facility]    Best regards,    Radha Peoples, NENO        Results for orders placed or performed in visit on 12/17/19   Albumin Random Urine Quantitative with Creat Ratio     Status: Abnormal   Result Value Ref Range    Creatinine Urine 120 mg/dL    Albumin Urine mg/L 38 mg/L    Albumin Urine mg/g Cr 31.92 (H) 0 - 17 mg/g Cr   **A1C FUTURE 1yr     Status: Abnormal   Result Value Ref Range    Hemoglobin A1C 6.3 (H) 0 - 5.6 %   TSH with free T4 reflex     Status: None   Result Value Ref Range    TSH 2.93 0.40 - 4.00 mU/L   PSA, screen     Status: Abnormal   Result Value Ref Range    PSA 5.89 (H) 0 - 4 ug/L   Uric acid     Status: None   Result Value Ref Range    Uric Acid 7.1 3.5 - 7.2 mg/dL

## 2019-12-18 PROBLEM — R97.20 ELEVATED PROSTATE SPECIFIC ANTIGEN (PSA): Status: ACTIVE | Noted: 2019-12-18

## 2019-12-18 LAB
CREAT UR-MCNC: 120 MG/DL
MICROALBUMIN UR-MCNC: 38 MG/L
MICROALBUMIN/CREAT UR: 31.92 MG/G CR (ref 0–17)
PSA SERPL-ACNC: 5.89 UG/L (ref 0–4)
TSH SERPL DL<=0.005 MIU/L-ACNC: 2.93 MU/L (ref 0.4–4)
URATE SERPL-MCNC: 7.1 MG/DL (ref 3.5–7.2)

## 2019-12-18 NOTE — TELEPHONE ENCOUNTER
Spoke to patient and relayed provider message below. Patient scheduled for a lab only appointment on 12/31/19. Patient agreed to plan and stated that he will go to the pharmacy for the BP check on 12/31/19. Patient will give the clinic a call with his results.  Tosha FRAGOSO RN, BSN

## 2019-12-26 ENCOUNTER — OFFICE VISIT (OUTPATIENT)
Dept: ORTHOPEDICS | Facility: CLINIC | Age: 79
End: 2019-12-26
Payer: COMMERCIAL

## 2019-12-26 ENCOUNTER — ANCILLARY PROCEDURE (OUTPATIENT)
Dept: GENERAL RADIOLOGY | Facility: CLINIC | Age: 79
End: 2019-12-26
Attending: ORTHOPAEDIC SURGERY
Payer: COMMERCIAL

## 2019-12-26 ENCOUNTER — HOSPITAL ENCOUNTER (OUTPATIENT)
Dept: MRI IMAGING | Facility: CLINIC | Age: 79
Discharge: HOME | End: 2019-12-26
Attending: ORTHOPAEDIC SURGERY | Admitting: ORTHOPAEDIC SURGERY
Payer: COMMERCIAL

## 2019-12-26 VITALS
BODY MASS INDEX: 30.81 KG/M2 | SYSTOLIC BLOOD PRESSURE: 142 MMHG | DIASTOLIC BLOOD PRESSURE: 74 MMHG | WEIGHT: 208 LBS | HEIGHT: 69 IN

## 2019-12-26 DIAGNOSIS — G89.29 CHRONIC RIGHT SHOULDER PAIN: ICD-10-CM

## 2019-12-26 DIAGNOSIS — M25.511 CHRONIC RIGHT SHOULDER PAIN: ICD-10-CM

## 2019-12-26 DIAGNOSIS — G89.29 CHRONIC RIGHT SHOULDER PAIN: Primary | ICD-10-CM

## 2019-12-26 DIAGNOSIS — M25.511 CHRONIC RIGHT SHOULDER PAIN: Primary | ICD-10-CM

## 2019-12-26 PROCEDURE — 73221 MRI JOINT UPR EXTREM W/O DYE: CPT | Mod: RT

## 2019-12-26 PROCEDURE — 99204 OFFICE O/P NEW MOD 45 MIN: CPT | Performed by: ORTHOPAEDIC SURGERY

## 2019-12-26 PROCEDURE — 73030 X-RAY EXAM OF SHOULDER: CPT | Mod: RT | Performed by: ORTHOPAEDIC SURGERY

## 2019-12-26 ASSESSMENT — MIFFLIN-ST. JEOR: SCORE: 1658.86

## 2019-12-26 NOTE — PATIENT INSTRUCTIONS
The [facility] team will call you to schedule your imaging exam in the next 1-2 days. You can also call them directly at [facility] [phone] ([address] [facility], [address], [place], [place]) to schedule your appointment.       Follow Up: Please make a follow up appointment in the clinic at least 2 days following your MRI by calling [phone].     If you have any further questions for your physician or physician s care team you can call [phone] and use option 2 then 3 to leave a voice message.

## 2019-12-26 NOTE — LETTER
12/26/2019         RE: Austin Garza  [address] [place] [address]        Dear Colleague,    Thank you for referring your patient, Austin Garza, to the [facility]THOP[facility] SURGERY. Please see a copy of my visit note below.    HISTORY OF PRESENT ILLNESS:    Austin Garza is a 79 year old male who is seen in consultation at the request of Dr. Peoples for right shoulder pain.     Present symptoms: chronic, diffuse right shoulder pain that began many years ago after a fall, pain has seemed to worsen insidiously after beginning cancer treatment over the past year. Pain worsens when he lays on it while sleeping and when reaching out. Pain is rated 8/10 at worst and 4/10 currently.   After the fall, he was seen at [facility] and was told that he had a small rotator cuff tear.  It was treated with physical therapy and he has been doing exercises rather religiously until about 2 years ago when he was diagnosed with leukemia.  Since he felt weak, he had been neglecting the exercises.  Lately the pain is gotten much worse with the lateral elevation movement.  Treatments tried to this point: home exercise program   Orthopedic PMH: was treated by [facility] after initial injury a few years ago, went to physical therapy at that time    Past Medical History:   Diagnosis Date     AAA (abdominal aortic aneurysm)      BCC (basal cell carcinoma of skin) - face      CLL (chronic lymphocytic leukemia)      Diaphragmatic hernia      Diverticulitis of colon      Esophageal reflux      Essential hypertension      Hyperlipidemia      Irritable bowel syndrome      Macular degeneration (senile) of retina      Squamous Cell Carcinoma, Back 1988     Type 2 diabetes mellitus        Past Surgical History:   Procedure Laterality Date     APPENDECTOMY OPEN  1966     BONE MARROW BIOPSY, BONE SPECIMEN, NEEDLE/TROCAR N/A 11/21/2017    Procedure: BIOPSY BONE MARROW;  BONE MARROW BIOPSY;  Surgeon: Shady Garcia MD;   Location:  GI     COLONOSCOPY       ENDOVASCULAR REPAIR ANEURYSM ABDOMINAL AORTA N/A 2017    Procedure: ENDOVASCULAR REPAIR ANEURYSM ABDOMINAL AORTA;  ENDOVASCULAR REPAIR ANEURYSM ABDOMINAL AORTA;  Surgeon: Milton Cazares MD;  Location: [facility] OR     Fistulotomy with marsupialization for repair of fisture in ano       IR ABDOMINAL AORTOGRAM  3/11/2019     IR VISCERAL EMBOLIZATION  2019     Multiple skin tags - resection  1998     Squamous cell skin cancer resection - back         Family History   Problem Relation Age of Onset     Cerebrovascular Disease Father         x 2     Hypertension Mother      C.A.D. Mother      Cancer Mother         skin     Eye Disorder Mother         glaucoma     Prostate Cancer No family hx of      Cancer - colorectal No family hx of      Glaucoma No family hx of      Macular Degeneration No family hx of        Social History     Socioeconomic History     Marital status:      Spouse name: librado     Number of children: 0     Years of education: 17     Highest education level: Not on file   Occupational History     Occupation: retired   Social Needs     Financial resource strain: Not on file     Food insecurity:     Worry: Not on file     Inability: Not on file     Transportation needs:     Medical: Not on file     Non-medical: Not on file   Tobacco Use     Smoking status: Former Smoker     Packs/day: 0.50     Years: 20.00     Pack years: 10.00     Last attempt to quit: 1975     Years since quittin.0     Smokeless tobacco: Former User   Substance and Sexual Activity     Alcohol use: Yes     Alcohol/week: 10.0 - 14.0 standard drinks     Types: 10 - 14 Standard drinks or equivalent per week     Comment: 2 drinks a day/wine     Drug use: No     Sexual activity: Never   Lifestyle     Physical activity:     Days per week: Not on file     Minutes per session: Not on file     Stress: Not on file   Relationships     Social connections:     Talks on  phone: Not on file     Gets together: Not on file     Attends [employer] service: Not on file     Active member of club or organization: Not on file     Attends meetings of clubs or organizations: Not on file     Relationship status: Not on file     Intimate partner violence:     Fear of current or ex partner: Not on file     Emotionally abused: Not on file     Physically abused: Not on file     Forced sexual activity: Not on file   Other Topics Concern     Parent/sibling w/ CABG, MI or angioplasty before 65F 55M? Not Asked   Social History Narrative     Not on file       Current Outpatient Medications   Medication Sig Dispense Refill     aspirin (ASA) 81 MG tablet Take 1 tablet (81 mg) by mouth every evening 90 tablet 3     atorvastatin (LIPITOR) 20 MG tablet Take 1 tablet (20 mg) by mouth At Bedtime 90 tablet 3     clopidogrel (PLAVIX) 75 MG tablet Take 1 tablet (75 mg) by mouth daily NEEDS VASCULAR FOLLOW UP IN MAY, 2020 90 tablet 1     glimepiride (AMARYL) 1 MG tablet Take 1 tablet (1 mg) by mouth every morning (before breakfast) 90 tablet 3     losartan-hydrochlorothiazide (HYZAAR) 100-25 MG tablet Take 1 tablet by mouth daily 90 tablet 3     metFORMIN (GLUCOPHAGE) 1000 MG tablet TAKE 1 TABLET BY MOUTH TWICE A DAY WITH FOOD 180 tablet 3     Multiple Vitamins-Minerals (CENTRUM SILVER) per tablet Take 1 tablet by mouth daily         Allergies   Allergen Reactions     Ace Inhibitors      cough       REVIEW OF SYSTEMS:  CONSTITUTIONAL:  NEGATIVE for fever, chills, change in weight  INTEGUMENTARY/SKIN:  NEGATIVE for worrisome rashes, moles or lesions  EYES:  NEGATIVE for vision changes or irritation  ENT/MOUTH:  NEGATIVE for ear, mouth and throat problems  RESP: shortness of breath NEGATIVE for significant cough or SOB  BREAST:  NEGATIVE for masses, tenderness or discharge  CV:  hypertension NEGATIVE for chest pain, palpitations or peripheral edema  GI: heartburn NEGATIVE for nausea, abdominal pain, heartburn, or  "change in bowel habits  :  Negative   MUSCULOSKELETAL:  See HPI above  NEURO:  NEGATIVE for weakness, dizziness or paresthesias  ENDOCRINE:  NEGATIVE for temperature intolerance, skin/hair changes  HEME/ALLERGY/IMMUNE:  NEGATIVE for bleeding problems  PSYCHIATRIC:  NEGATIVE for changes in mood or affect      PHYSICAL EXAM:  BP (!) 142/74   Ht 1.753 m (5' 9\")   Wt 94.3 kg (208 lb)   BMI 30.72 kg/m     Body mass index is 30.72 kg/m .   GENERAL APPEARANCE: healthy, alert and no distress   HEENT: No apparent thyroid megaly. Clear sclera with normal ocular movement  RESPIRATORY: No labored breathing  SKIN: no suspicious lesions or rashes  NEURO: Normal strength and tone, mentation intact and speech normal  VASCULAR: Good pulses, and capillary refill   LYMPH: no lymphadenopathy   PSYCH:  mentation appears normal and affect normal/bright    MUSCULOSKELETAL:  Not in acute distress  Normal neck movement  Symmetrical near full range of motion of both shoulders  Mild impingement sign  Mild to Wheeler sign, right  Negative arm drop test  Symmetrical full isometric strength in all directions  Crepitus at the AC joint, right  No significant pain however with range of motion and adduction of the shoulder at the AC joint  Negative belly press test  Full range of motion elbow with full strength  Distal neurovascular status is intact     ASSESSMENT:  Chronic right shoulder pain with a previous history of rotator cuff tear  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  Presence of AC joint DJD along with type III acromion was pointed out.  He is at risk for chronic impingement as a result.  With previous history of rotator cuff tear and because of a history of leukemia, I recommended this point MRI scan evaluation.  We may consider cortisone injection if rotator cuff tear is not significant.  In the meantime continue with the stretching exercises.  Follow-up after MRI " scan is completed    Imaging Interpretation:   AC joint DJD and type III acromion noted.  No acute findings otherwise.      Damien Amaral MD  Department of Orthopedic Surgery        Disclaimer: This note consists of symbols derived from keyboarding, dictation and/or voice recognition software. As a result, there may be errors in the script that have gone undetected. Please consider this when interpreting information found in this chart.      Again, thank you for allowing me to participate in the care of your patient.        Sincerely,        Damien Amaral MD

## 2019-12-26 NOTE — PROGRESS NOTES
HISTORY OF PRESENT ILLNESS:    Austin Garza is a 79 year old male who is seen in consultation at the request of Dr. Peoples for right shoulder pain.     Present symptoms: chronic, diffuse right shoulder pain that began many years ago after a fall, pain has seemed to worsen insidiously after beginning cancer treatment over the past year. Pain worsens when he lays on it while sleeping and when reaching out. Pain is rated 8/10 at worst and 4/10 currently.   After the fall, he was seen at [facility] and was told that he had a small rotator cuff tear.  It was treated with physical therapy and he has been doing exercises rather religiously until about 2 years ago when he was diagnosed with leukemia.  Since he felt weak, he had been neglecting the exercises.  Lately the pain is gotten much worse with the lateral elevation movement.  Treatments tried to this point: home exercise program   Orthopedic PMH: was treated by [facility] after initial injury a few years ago, went to physical therapy at that time    Past Medical History:   Diagnosis Date     AAA (abdominal aortic aneurysm)      BCC (basal cell carcinoma of skin) - face      CLL (chronic lymphocytic leukemia)      Diaphragmatic hernia      Diverticulitis of colon      Esophageal reflux      Essential hypertension      Hyperlipidemia      Irritable bowel syndrome      Macular degeneration (senile) of retina      Squamous Cell Carcinoma, Back 1988     Type 2 diabetes mellitus        Past Surgical History:   Procedure Laterality Date     APPENDECTOMY OPEN  1966     BONE MARROW BIOPSY, BONE SPECIMEN, NEEDLE/TROCAR N/A 11/21/2017    Procedure: BIOPSY BONE MARROW;  BONE MARROW BIOPSY;  Surgeon: Shady Garcia MD;  Location:  GI     COLONOSCOPY  2002     ENDOVASCULAR REPAIR ANEURYSM ABDOMINAL AORTA N/A 12/4/2017    Procedure: ENDOVASCULAR REPAIR ANEURYSM ABDOMINAL AORTA;  ENDOVASCULAR REPAIR ANEURYSM ABDOMINAL AORTA;  Surgeon: Milton Cazares MD;  Location:  OR      Fistulotomy with marsupialization for repair of fisture in ano       IR ABDOMINAL AORTOGRAM  3/11/2019     IR VISCERAL EMBOLIZATION  2019     Multiple skin tags - resection  1998     Squamous cell skin cancer resection - back         Family History   Problem Relation Age of Onset     Cerebrovascular Disease Father         x 2     Hypertension Mother      C.A.D. Mother      Cancer Mother         skin     Eye Disorder Mother         glaucoma     Prostate Cancer No family hx of      Cancer - colorectal No family hx of      Glaucoma No family hx of      Macular Degeneration No family hx of        Social History     Socioeconomic History     Marital status:      Spouse name: librado     Number of children: 0     Years of education: 17     Highest education level: Not on file   Occupational History     Occupation: retired   Social Needs     Financial resource strain: Not on file     Food insecurity:     Worry: Not on file     Inability: Not on file     Transportation needs:     Medical: Not on file     Non-medical: Not on file   Tobacco Use     Smoking status: Former Smoker     Packs/day: 0.50     Years: 20.00     Pack years: 10.00     Last attempt to quit: 1975     Years since quittin.0     Smokeless tobacco: Former User   Substance and Sexual Activity     Alcohol use: Yes     Alcohol/week: 10.0 - 14.0 standard drinks     Types: 10 - 14 Standard drinks or equivalent per week     Comment: 2 drinks a day/wine     Drug use: No     Sexual activity: Never   Lifestyle     Physical activity:     Days per week: Not on file     Minutes per session: Not on file     Stress: Not on file   Relationships     Social connections:     Talks on phone: Not on file     Gets together: Not on file     Attends [employer] service: Not on file     Active member of club or organization: Not on file     Attends meetings of clubs or organizations: Not on file     Relationship status: Not on file     Intimate partner  violence:     Fear of current or ex partner: Not on file     Emotionally abused: Not on file     Physically abused: Not on file     Forced sexual activity: Not on file   Other Topics Concern     Parent/sibling w/ CABG, MI or angioplasty before 65F 55M? Not Asked   Social History Narrative     Not on file       Current Outpatient Medications   Medication Sig Dispense Refill     aspirin (ASA) 81 MG tablet Take 1 tablet (81 mg) by mouth every evening 90 tablet 3     atorvastatin (LIPITOR) 20 MG tablet Take 1 tablet (20 mg) by mouth At Bedtime 90 tablet 3     clopidogrel (PLAVIX) 75 MG tablet Take 1 tablet (75 mg) by mouth daily NEEDS VASCULAR FOLLOW UP IN MAY, 2020 90 tablet 1     glimepiride (AMARYL) 1 MG tablet Take 1 tablet (1 mg) by mouth every morning (before breakfast) 90 tablet 3     losartan-hydrochlorothiazide (HYZAAR) 100-25 MG tablet Take 1 tablet by mouth daily 90 tablet 3     metFORMIN (GLUCOPHAGE) 1000 MG tablet TAKE 1 TABLET BY MOUTH TWICE A DAY WITH FOOD 180 tablet 3     Multiple Vitamins-Minerals (CENTRUM SILVER) per tablet Take 1 tablet by mouth daily         Allergies   Allergen Reactions     Ace Inhibitors      cough       REVIEW OF SYSTEMS:  CONSTITUTIONAL:  NEGATIVE for fever, chills, change in weight  INTEGUMENTARY/SKIN:  NEGATIVE for worrisome rashes, moles or lesions  EYES:  NEGATIVE for vision changes or irritation  ENT/MOUTH:  NEGATIVE for ear, mouth and throat problems  RESP: shortness of breath NEGATIVE for significant cough or SOB  BREAST:  NEGATIVE for masses, tenderness or discharge  CV:  hypertension NEGATIVE for chest pain, palpitations or peripheral edema  GI: heartburn NEGATIVE for nausea, abdominal pain, heartburn, or change in bowel habits  :  Negative   MUSCULOSKELETAL:  See HPI above  NEURO:  NEGATIVE for weakness, dizziness or paresthesias  ENDOCRINE:  NEGATIVE for temperature intolerance, skin/hair changes  HEME/ALLERGY/IMMUNE:  NEGATIVE for bleeding problems  PSYCHIATRIC:   "NEGATIVE for changes in mood or affect      PHYSICAL EXAM:  BP (!) 142/74   Ht 1.753 m (5' 9\")   Wt 94.3 kg (208 lb)   BMI 30.72 kg/m    Body mass index is 30.72 kg/m .   GENERAL APPEARANCE: healthy, alert and no distress   HEENT: No apparent thyroid megaly. Clear sclera with normal ocular movement  RESPIRATORY: No labored breathing  SKIN: no suspicious lesions or rashes  NEURO: Normal strength and tone, mentation intact and speech normal  VASCULAR: Good pulses, and capillary refill   LYMPH: no lymphadenopathy   PSYCH:  mentation appears normal and affect normal/bright    MUSCULOSKELETAL:  Not in acute distress  Normal neck movement  Symmetrical near full range of motion of both shoulders  Mild impingement sign  Mild to Wheeler sign, right  Negative arm drop test  Symmetrical full isometric strength in all directions  Crepitus at the AC joint, right  No significant pain however with range of motion and adduction of the shoulder at the AC joint  Negative belly press test  Full range of motion elbow with full strength  Distal neurovascular status is intact     ASSESSMENT:  Chronic right shoulder pain with a previous history of rotator cuff tear  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  Presence of AC joint DJD along with type III acromion was pointed out.  He is at risk for chronic impingement as a result.  With previous history of rotator cuff tear and because of a history of leukemia, I recommended this point MRI scan evaluation.  We may consider cortisone injection if rotator cuff tear is not significant.  In the meantime continue with the stretching exercises.  Follow-up after MRI scan is completed    Imaging Interpretation:   AC joint DJD and type III acromion noted.  No acute findings otherwise.      Damien Amaral MD  Department of Orthopedic Surgery        Disclaimer: This note consists of symbols derived from keyboarding, dictation and/or voice " recognition software. As a result, there may be errors in the script that have gone undetected. Please consider this when interpreting information found in this chart.

## 2019-12-31 DIAGNOSIS — Z12.5 ENCOUNTER FOR SCREENING FOR MALIGNANT NEOPLASM OF PROSTATE: ICD-10-CM

## 2019-12-31 DIAGNOSIS — E11.9 TYPE 2 DIABETES MELLITUS WITHOUT COMPLICATION, WITHOUT LONG-TERM CURRENT USE OF INSULIN (H): ICD-10-CM

## 2019-12-31 DIAGNOSIS — R97.20 ELEVATED PROSTATE SPECIFIC ANTIGEN (PSA): ICD-10-CM

## 2019-12-31 DIAGNOSIS — I10 HYPERTENSION GOAL BP (BLOOD PRESSURE) < 140/90: ICD-10-CM

## 2019-12-31 LAB
ANION GAP SERPL CALCULATED.3IONS-SCNC: 8 MMOL/L (ref 3–14)
BUN SERPL-MCNC: 30 MG/DL (ref 7–30)
CALCIUM SERPL-MCNC: 8.7 MG/DL (ref 8.5–10.1)
CHLORIDE SERPL-SCNC: 106 MMOL/L (ref 94–109)
CO2 SERPL-SCNC: 24 MMOL/L (ref 20–32)
CREAT SERPL-MCNC: 1.46 MG/DL (ref 0.66–1.25)
GFR SERPL CREATININE-BSD FRML MDRD: 46 ML/MIN/{1.73_M2}
GLUCOSE SERPL-MCNC: 282 MG/DL (ref 70–99)
HBA1C MFR BLD: 6.3 % (ref 0–5.6)
POTASSIUM SERPL-SCNC: 4.3 MMOL/L (ref 3.4–5.3)
PSA SERPL-ACNC: 5.49 UG/L (ref 0–4)
SODIUM SERPL-SCNC: 138 MMOL/L (ref 133–144)
TSH SERPL DL<=0.005 MIU/L-ACNC: 3.57 MU/L (ref 0.4–4)

## 2019-12-31 PROCEDURE — 84443 ASSAY THYROID STIM HORMONE: CPT | Performed by: NURSE PRACTITIONER

## 2019-12-31 PROCEDURE — G0103 PSA SCREENING: HCPCS | Performed by: NURSE PRACTITIONER

## 2019-12-31 PROCEDURE — 36415 COLL VENOUS BLD VENIPUNCTURE: CPT | Performed by: NURSE PRACTITIONER

## 2019-12-31 PROCEDURE — 80048 BASIC METABOLIC PNL TOTAL CA: CPT | Performed by: NURSE PRACTITIONER

## 2019-12-31 PROCEDURE — 83036 HEMOGLOBIN GLYCOSYLATED A1C: CPT | Performed by: NURSE PRACTITIONER

## 2020-01-03 ENCOUNTER — OFFICE VISIT (OUTPATIENT)
Dept: ORTHOPEDICS | Facility: CLINIC | Age: 80
End: 2020-01-03
Payer: COMMERCIAL

## 2020-01-03 VITALS
HEIGHT: 69 IN | BODY MASS INDEX: 30.81 KG/M2 | SYSTOLIC BLOOD PRESSURE: 122 MMHG | DIASTOLIC BLOOD PRESSURE: 62 MMHG | WEIGHT: 208 LBS

## 2020-01-03 DIAGNOSIS — M19.011 ARTHRITIS OF RIGHT ACROMIOCLAVICULAR JOINT: ICD-10-CM

## 2020-01-03 DIAGNOSIS — M75.112 PARTIAL NONTRAUMATIC RUPTURE OF LEFT ROTATOR CUFF: ICD-10-CM

## 2020-01-03 DIAGNOSIS — M75.51 SUBACROMIAL BURSITIS OF RIGHT SHOULDER JOINT: Primary | ICD-10-CM

## 2020-01-03 PROCEDURE — 20610 DRAIN/INJ JOINT/BURSA W/O US: CPT | Mod: RT | Performed by: ORTHOPAEDIC SURGERY

## 2020-01-03 PROCEDURE — 99213 OFFICE O/P EST LOW 20 MIN: CPT | Mod: 25 | Performed by: ORTHOPAEDIC SURGERY

## 2020-01-03 RX ORDER — LIDOCAINE HYDROCHLORIDE 10 MG/ML
4 INJECTION, SOLUTION INFILTRATION; PERINEURAL
Status: DISCONTINUED | OUTPATIENT
Start: 2020-01-03 | End: 2020-12-10

## 2020-01-03 RX ORDER — TESTOSTERONE CYPIONATE 200 MG/ML
2 INJECTION INTRAMUSCULAR
Status: DISCONTINUED | OUTPATIENT
Start: 2020-01-03 | End: 2020-12-10

## 2020-01-03 RX ADMIN — TESTOSTERONE CYPIONATE 2 ML: 200 INJECTION INTRAMUSCULAR at 09:15

## 2020-01-03 RX ADMIN — LIDOCAINE HYDROCHLORIDE 4 ML: 10 INJECTION, SOLUTION INFILTRATION; PERINEURAL at 09:15

## 2020-01-03 ASSESSMENT — MIFFLIN-ST. JEOR: SCORE: 1658.86

## 2020-01-03 NOTE — PROGRESS NOTES
"HISTORY OF PRESENT ILLNESS:    Austin Garza is a 79 year old male who is seen in follow up for  right shoulder MRI results. Patient was last evaluated on 12/26/19. Patient is right hand dominant.   Present symptoms: Patient reports no change in shoulder pain and symptoms since last office visit. He notes his pain is intermittent and will increase with reaching, and raising the arm overhead. Increased pain at nighttime, and difficulties falling and staying asleep due to shoulder pain.  Current pain level: 0/10.   A1C on 12/31/19: 6.3  Treatments tried to this point: Physical Therapy with HEP.   Past Medical History: Unchanged from the visit of 12/26/19. Please refer to that note.    REVIEW OF SYSTEMS:  CONSTITUTIONAL:  NEGATIVE for fever, chills, change in weight  INTEGUMENTARY/SKIN:  NEGATIVE for worrisome rashes, moles or lesions  EYES:  NEGATIVE for vision changes or irritation  ENT/MOUTH:  NEGATIVE for ear, mouth and throat problems  RESP: shortness of breath NEGATIVE for significant cough or SOB  BREAST:  NEGATIVE for masses, tenderness or discharge  CV:  hypertension NEGATIVE for chest pain, palpitations or peripheral edema  GI: heartburn NEGATIVE for nausea, abdominal pain, heartburn, or change in bowel habits  :  Negative   MUSCULOSKELETAL:  See HPI above  NEURO:  NEGATIVE for weakness, dizziness or paresthesias  ENDOCRINE:  NEGATIVE for temperature intolerance, skin/hair changes  HEME/ALLERGY/IMMUNE:  NEGATIVE for bleeding problems  PSYCHIATRIC:  NEGATIVE for changes in mood or affect    PHYSICAL EXAM:  /62 (BP Location: Right arm, Patient Position: Chair, Cuff Size: Adult Regular)   Ht 1.753 m (5' 9\")   Wt 94.3 kg (208 lb)   BMI 30.72 kg/m    Body mass index is 30.72 kg/m .   GENERAL APPEARANCE: healthy, alert and no distress   SKIN: no suspicious lesions or rashes  NEURO: Normal strength and tone, mentation intact and speech normal  VASCULAR:  good pulses, and cappillary refill   LYMPH: no " lymphadenopathy   PSYCH:  mentation appears normal and affect normal/bright    MSK:  No swelling, erythema or warmth, right shoulder  Numerous moles and the entire trunk  Range of motion right shoulder is been well-maintained  No noticeable changes from his last visit    IMAGING INTERPRETATION:    EXAMINATION: MR SHOULDER RIGHT W/O CONTRAST  12/26/2019 6:46 PM      CLINICAL HISTORY:  rule out RTC tear; Chronic right shoulder pain;  Chronic right shoulder pain      COMPARISON: Radiograph dated 12/26/2019     Findings:     ROTATOR CUFF and ASSOCIATED STRUCTURES  Rotator cuff: There is a moderate to high-grade intrasubstance partial  thickness, partial width and bursal sided (at the musculotendinous  junction) moderate grade partial thickness, partial width tear of the  distal supraspinatus tendon superimposed on high-grade tendinopathy  this associated bursal sided tendon retraction. There is low to  moderate grade articular sided tear at the junction of the posterior  supraspinatus and anterior infraspinatus tendon. Mild to moderate  grade tendinopathy is noted of the infraspinatus, subcortical cystic  changes at the posterior attachment site about the humeral head. No  full-thickness tear of the infraspinatus. There is a small focal near  full-thickness, partial width articular sided tear of the far superior  fibers of the subscapularis tendon superimposed on high-grade  tendinopathy.      Bursa: Mild subacromial or subdeltoid bursal fluid.     Musculature: Muscle bulk of rotator cuff reveals fatty infiltration of  the supraspinatus muscle and infraspinatus muscle bulk with very mild  muscle bulk loss of the supraspinatus, but otherwise preserved muscle  bulk of the rotator cuff..  Deltoid muscle bulk is also preserved.  No  muscle edema.     Acromioclavicular joint  There are high-grade degenerative changes of the acromioclavicular  joint with fluid in the joint and capsular thickening, osteophyte  formation.  Acromion is type 2 in sagittal morphology.  Coracoacromial  ligament is thickened, there is a small subacromial spur.     OSSEOUS STRUCTURES  No fracture, marrow contusion or marrow infiltration.     LONG BICIPITAL TENDON  The long head of the biceps tendon is normally situated within the  bicipital groove. No complete or partial biceps tendon tear is  present. Mild tendinopathy of the intra-articular portion of the  biceps tendon and fraying about the bicipital labral anchor.     GLENOHUMERAL JOINT  Joint fluid: Physiologic amount of joint fluid is  present.     Cartilage and subarticular bone:  Thinning of the articular cartilage  of the glenohumeral joint.. No Hill-Sachs, reverse Hill-Sachs, or bony  Bankart lesions are seen.     Labrum: Limited assessment on this study with relative lack of joint  distention shows no labral tear. Mild to moderate circumferential  labral degeneration.                                                                      IMPRESSION:   1. Moderate to high grade partial-thickness, partial width  intrasubstance tear of the far anterior fibers of the supraspinatus,  moderate grade bursal sided tear at the musculotendinous junction of  the supraspinatus tendon superimposed on high-grade tendinopathy. Low  to moderate grade tearing of the articular sided fibers at the  junction of supraspinatus and infraspinatus. Mild fatty infiltration  of the infraspinatus. Mild fatty infiltration and muscle bulk loss of  the supraspinatus.  2. Small focal, near full-thickness tear of the far superior fibers of  the subscapularis superimposed on high-grade tendinopathy.  3. High-grade degenerative changes of the acromioclavicular joint with  osteophyte formation. Small subacromial spur. Mild narrowing of the  rotator cuff outlet.  4. Mild cartilage loss of the glenohumeral joint. Mild to moderate  degenerative changes of the labrum circumferentially.     JUTTA ELLERMANN, MD       ASSESSMENT:  Advanced  AC joint DJD  Mild glenohumeral joint DJD  Partial-thickness supraspinatus tear  Probable small full-thickness superior subscapularis tear, right shoulder      PLAN:  We visualized images of the MRI scan from December 26, 2019.  Findings are thoroughly explained.  With absence of full-thickness rotator cuff tear involving supraspinatus, at this point, a cortisone injection was felt to be reasonable initial treatment.  Given his underlying diabetes, range of motion exercises were emphasized for him to do.  He agreed to have a cortisone injection which was given and he tolerated well.  What to expect from the cortisone injection was informed.  Follow-up in 2 months if pain persists.       Large Joint Injection/Arthocentesis: R subacromial bursa  Date/Time: 1/3/2020 9:15 AM  Performed by: Damien Amaral MD  Authorized by: Damien Amaral MD     Indications:  Pain and osteoarthritis  Needle Size:  22 G  Guidance: landmark guided    Approach:  Posterior  Location:  Shoulder      Site:  R subacromial bursa  Medications:  2 mL dexamethasone 120 MG/30ML; 4 mL lidocaine 1 %  Medications comment:  8mL of 1% Lidocaine was injected.  Outcome:  Tolerated well, no immediate complications  Procedure discussed: discussed risks, benefits, and alternatives    Consent Given by:  Patient  Timeout: timeout called immediately prior to procedure    Prep: patient was prepped and draped in usual sterile fashion            Damien Amaral MD  Dept. Orthopedic Surgery  [facility]       Disclaimer: This note consists of symbols derived from keyboarding, dictation and/or voice recognition software. As a result, there may be errors in the script that have gone undetected. Please consider this when interpreting information found in this chart.

## 2020-01-03 NOTE — LETTER
"    1/3/2020         RE: Austin Garza  [address] [place] [address]        Dear Colleague,    Thank you for referring your patient, Austin Garza, to the [facility]THOP[facility]IC SURGERY. Please see a copy of my visit note below.    HISTORY OF PRESENT ILLNESS:    Austin Garza is a 79 year old male who is seen in follow up for  right shoulder MRI results. Patient was last evaluated on 12/26/19. Patient is right hand dominant.   Present symptoms: Patient reports no change in shoulder pain and symptoms since last office visit. He notes his pain is intermittent and will increase with reaching, and raising the arm overhead. Increased pain at nighttime, and difficulties falling and staying asleep due to shoulder pain.  Current pain level: 0/10.   A1C on 12/31/19: 6.3  Treatments tried to this point: Physical Therapy with HEP.   Past Medical History: Unchanged from the visit of 12/26/19. Please refer to that note.    REVIEW OF SYSTEMS:  CONSTITUTIONAL:  NEGATIVE for fever, chills, change in weight  INTEGUMENTARY/SKIN:  NEGATIVE for worrisome rashes, moles or lesions  EYES:  NEGATIVE for vision changes or irritation  ENT/MOUTH:  NEGATIVE for ear, mouth and throat problems  RESP: shortness of breath NEGATIVE for significant cough or SOB  BREAST:  NEGATIVE for masses, tenderness or discharge  CV:  hypertension NEGATIVE for chest pain, palpitations or peripheral edema  GI: heartburn NEGATIVE for nausea, abdominal pain, heartburn, or change in bowel habits  :  Negative   MUSCULOSKELETAL:  See HPI above  NEURO:  NEGATIVE for weakness, dizziness or paresthesias  ENDOCRINE:  NEGATIVE for temperature intolerance, skin/hair changes  HEME/ALLERGY/IMMUNE:  NEGATIVE for bleeding problems  PSYCHIATRIC:  NEGATIVE for changes in mood or affect    PHYSICAL EXAM:  /62 (BP Location: Right arm, Patient Position: Chair, Cuff Size: Adult Regular)   Ht 1.753 m (5' 9\")   Wt 94.3 kg (208 lb)   BMI 30.72 kg/m   "   Body mass index is 30.72 kg/m .   GENERAL APPEARANCE: healthy, alert and no distress   SKIN: no suspicious lesions or rashes  NEURO: Normal strength and tone, mentation intact and speech normal  VASCULAR:  good pulses, and cappillary refill   LYMPH: no lymphadenopathy   PSYCH:  mentation appears normal and affect normal/bright    MSK:  No swelling, erythema or warmth, right shoulder  Numerous moles and the entire trunk  Range of motion right shoulder is been well-maintained  No noticeable changes from his last visit    IMAGING INTERPRETATION:    EXAMINATION: MR SHOULDER RIGHT W/O CONTRAST  12/26/2019 6:46 PM      CLINICAL HISTORY:  rule out RTC tear; Chronic right shoulder pain;  Chronic right shoulder pain      COMPARISON: Radiograph dated 12/26/2019     Findings:     ROTATOR CUFF and ASSOCIATED STRUCTURES  Rotator cuff: There is a moderate to high-grade intrasubstance partial  thickness, partial width and bursal sided (at the musculotendinous  junction) moderate grade partial thickness, partial width tear of the  distal supraspinatus tendon superimposed on high-grade tendinopathy  this associated bursal sided tendon retraction. There is low to  moderate grade articular sided tear at the junction of the posterior  supraspinatus and anterior infraspinatus tendon. Mild to moderate  grade tendinopathy is noted of the infraspinatus, subcortical cystic  changes at the posterior attachment site about the humeral head. No  full-thickness tear of the infraspinatus. There is a small focal near  full-thickness, partial width articular sided tear of the far superior  fibers of the subscapularis tendon superimposed on high-grade  tendinopathy.      Bursa: Mild subacromial or subdeltoid bursal fluid.     Musculature: Muscle bulk of rotator cuff reveals fatty infiltration of  the supraspinatus muscle and infraspinatus muscle bulk with very mild  muscle bulk loss of the supraspinatus, but otherwise preserved muscle  bulk of the  rotator cuff..  Deltoid muscle bulk is also preserved.  No  muscle edema.     Acromioclavicular joint  There are high-grade degenerative changes of the acromioclavicular  joint with fluid in the joint and capsular thickening, osteophyte  formation. Acromion is type 2 in sagittal morphology.  Coracoacromial  ligament is thickened, there is a small subacromial spur.     OSSEOUS STRUCTURES  No fracture, marrow contusion or marrow infiltration.     LONG BICIPITAL TENDON  The long head of the biceps tendon is normally situated within the  bicipital groove. No complete or partial biceps tendon tear is  present. Mild tendinopathy of the intra-articular portion of the  biceps tendon and fraying about the bicipital labral anchor.     GLENOHUMERAL JOINT  Joint fluid: Physiologic amount of joint fluid is  present.     Cartilage and subarticular bone:  Thinning of the articular cartilage  of the glenohumeral joint.. No Hill-Sachs, reverse Hill-Sachs, or bony  Bankart lesions are seen.     Labrum: Limited assessment on this study with relative lack of joint  distention shows no labral tear. Mild to moderate circumferential  labral degeneration.                                                                      IMPRESSION:   1. Moderate to high grade partial-thickness, partial width  intrasubstance tear of the far anterior fibers of the supraspinatus,  moderate grade bursal sided tear at the musculotendinous junction of  the supraspinatus tendon superimposed on high-grade tendinopathy. Low  to moderate grade tearing of the articular sided fibers at the  junction of supraspinatus and infraspinatus. Mild fatty infiltration  of the infraspinatus. Mild fatty infiltration and muscle bulk loss of  the supraspinatus.  2. Small focal, near full-thickness tear of the far superior fibers of  the subscapularis superimposed on high-grade tendinopathy.  3. High-grade degenerative changes of the acromioclavicular joint with  osteophyte  formation. Small subacromial spur. Mild narrowing of the  rotator cuff outlet.  4. Mild cartilage loss of the glenohumeral joint. Mild to moderate  degenerative changes of the labrum circumferentially.     JUTTA ELLERMANN, MD       ASSESSMENT:  Advanced AC joint DJD  Mild glenohumeral joint DJD  Partial-thickness supraspinatus tear  Probable small full-thickness superior subscapularis tear, right shoulder      PLAN:  We visualized images of the MRI scan from December 26, 2019.  Findings are thoroughly explained.  With absence of full-thickness rotator cuff tear involving supraspinatus, at this point, a cortisone injection was felt to be reasonable initial treatment.  Given his underlying diabetes, range of motion exercises were emphasized for him to do.  He agreed to have a cortisone injection which was given and he tolerated well.  What to expect from the cortisone injection was informed.  Follow-up in 2 months if pain persists.       Large Joint Injection/Arthocentesis: R subacromial bursa  Date/Time: 1/3/2020 9:15 AM  Performed by: Damien Amaral MD  Authorized by: Damien Amaral MD     Indications:  Pain and osteoarthritis  Needle Size:  22 G  Guidance: landmark guided    Approach:  Posterior  Location:  Shoulder      Site:  R subacromial bursa  Medications:  2 mL dexamethasone 120 MG/30ML; 4 mL lidocaine 1 %  Medications comment:  8mL of 1% Lidocaine was injected.  Outcome:  Tolerated well, no immediate complications  Procedure discussed: discussed risks, benefits, and alternatives    Consent Given by:  Patient  Timeout: timeout called immediately prior to procedure    Prep: patient was prepped and draped in usual sterile fashion            Damien Amaral MD  Dept. Orthopedic Surgery  [facility]       Disclaimer: This note consists of symbols derived from keyboarding, dictation and/or voice recognition software. As a result, there may be errors in the script that have gone undetected. Please  consider this when interpreting information found in this chart.      Again, thank you for allowing me to participate in the care of your patient.        Sincerely,        Damien Amaral MD

## 2020-01-03 NOTE — PATIENT INSTRUCTIONS
1. Subacromial bursitis of right shoulder joint      Steroid injection of the right shoulder: subacromial space was performed today in clinic    - Would not soak in a hot tub, bath or swimming pool for 48 hours  - Ok to shower  - Ice today and only do your normal amounts of activity  - The lidocaine (what is giving you pain relief right now) will likely stop working in 1-2 hours.  You will then have pain again, similar to before you received the injection. The corticosteroid will not start working until approximately 1-2 weeks from now.  In a small percentage of people, cortisone can cause flushing/redness in the face. This usually lasts for 1-3 days and resolves. Cool compress and Ibuprofen/Tylenol can help if this happens.  Can repeat the injection anytime after 4 months    Follow up for repeat injection when pain returns.

## 2020-01-15 ENCOUNTER — TRANSFERRED RECORDS (OUTPATIENT)
Dept: HEALTH INFORMATION MANAGEMENT | Facility: CLINIC | Age: 80
End: 2020-01-15

## 2020-01-23 ENCOUNTER — OFFICE VISIT (OUTPATIENT)
Dept: ORTHOPEDICS | Facility: CLINIC | Age: 80
End: 2020-01-23
Payer: COMMERCIAL

## 2020-01-23 VITALS
BODY MASS INDEX: 30.81 KG/M2 | SYSTOLIC BLOOD PRESSURE: 128 MMHG | WEIGHT: 208 LBS | HEIGHT: 69 IN | DIASTOLIC BLOOD PRESSURE: 64 MMHG

## 2020-01-23 DIAGNOSIS — M75.112 NONTRAUMATIC INCOMPLETE TEAR OF LEFT ROTATOR CUFF: Primary | ICD-10-CM

## 2020-01-23 PROCEDURE — 99213 OFFICE O/P EST LOW 20 MIN: CPT | Performed by: ORTHOPAEDIC SURGERY

## 2020-01-23 ASSESSMENT — MIFFLIN-ST. JEOR: SCORE: 1658.86

## 2020-01-23 NOTE — PROGRESS NOTES
HISTORY OF PRESENT ILLNESS:    Austin Garza is a 79 year old male who is seen in follow up for bilateral shoulder pain.  Present symptoms: improving pain in right shoulder where the injection was. Noticing a lateral and posterior right shoulder pain. Pain worsens with reaching out and behind back motion. Denies any numbness or tingling in his right arm. Patient denies pain in his left shoulder today.   Treatments tried to this point: right subacromical bursa injection provided good relief  Past Medical History: Unchanged from the visit of 1/3/2020. Please refer to that note.    REVIEW OF SYSTEMS:  CONSTITUTIONAL:  NEGATIVE for fever, chills, change in weight  INTEGUMENTARY/SKIN:  NEGATIVE for worrisome rashes, moles or lesions  EYES:  NEGATIVE for vision changes or irritation  ENT/MOUTH:  NEGATIVE for ear, mouth and throat problems  RESP:  NEGATIVE for significant cough or SOB  BREAST:  NEGATIVE for masses, tenderness or discharge  CV:  NEGATIVE for chest pain, palpitations or peripheral edema  GI:  NEGATIVE for nausea, abdominal pain, heartburn, or change in bowel habits  :  Negative   MUSCULOSKELETAL:  See HPI above  NEURO:  NEGATIVE for weakness, dizziness or paresthesias  ENDOCRINE:  NEGATIVE for temperature intolerance, skin/hair changes  HEME/ALLERGY/IMMUNE:  NEGATIVE for bleeding problems  PSYCHIATRIC:  NEGATIVE for changes in mood or affect    PHYSICAL EXAM:  There were no vitals taken for this visit.  There is no height or weight on file to calculate BMI.   GENERAL APPEARANCE: healthy, alert and no distress   SKIN: no suspicious lesions or rashes  NEURO: Normal strength and tone, mentation intact and speech normal  VASCULAR:  good pulses, and cappillary refill   LYMPH: no lymphadenopathy   PSYCH:  mentation appears normal and affect normal/bright    MSK:  Not in acute distress  Normal gait  Slightly painful yet full range of motion noted on both shoulders  Subtle weakness of external rotation and  abduction, right compared to left  Isometric strength itself is well-maintained  Elbow range of motion is well-maintained      IMAGING INTERPRETATION: None taken today  ASSESSMENT:  Some improvement from the cortisone injection to the right shoulder  Underlying partial-thickness rotator cuff tear and impingement along with AC joint DJD    PLAN:  We explained to him that cortisone injection appears to be helping him with pain that he was dealing with.  At the same time the partial-thickness rotator cuff tear will not be healed by a cortisone injection.  It was emphasized that range of motion is important for him to maintain to prevent frozen shoulder.  Some of the exercises that can be painful should be avoided.  It is too early to make a decision as to whether he would consider surgery however, if the pain persist beyond the next 2 to 3 months, he may be a candidate for surgical intervention of decompression and rotator cuff tear repair as needed.  All the questions were answered.  Follow-up as needed.             Damien Amaral MD  Dept. Orthopedic Surgery  [facility]       Disclaimer: This note consists of symbols derived from keyboarding, dictation and/or voice recognition software. As a result, there may be errors in the script that have gone undetected. Please consider this when interpreting information found in this chart.

## 2020-01-23 NOTE — LETTER
1/23/2020         RE: Austin Garza  [address] [place] [address]        Dear Colleague,    Thank you for referring your patient, Austin Garza, to the [facility] ORTHOPEDIC SURGERY. Please see a copy of my visit note below.    HISTORY OF PRESENT ILLNESS:    Austin Garza is a 79 year old male who is seen in follow up for bilateral shoulder pain.  Present symptoms: improving pain in right shoulder where the injection was. Noticing a lateral and posterior right shoulder pain. Pain worsens with reaching out and behind back motion. Denies any numbness or tingling in his right arm. Patient denies pain in his left shoulder today.   Treatments tried to this point: right subacromical bursa injection provided good relief  Past Medical History: Unchanged from the visit of 1/3/2020. Please refer to that note.    REVIEW OF SYSTEMS:  CONSTITUTIONAL:  NEGATIVE for fever, chills, change in weight  INTEGUMENTARY/SKIN:  NEGATIVE for worrisome rashes, moles or lesions  EYES:  NEGATIVE for vision changes or irritation  ENT/MOUTH:  NEGATIVE for ear, mouth and throat problems  RESP:  NEGATIVE for significant cough or SOB  BREAST:  NEGATIVE for masses, tenderness or discharge  CV:  NEGATIVE for chest pain, palpitations or peripheral edema  GI:  NEGATIVE for nausea, abdominal pain, heartburn, or change in bowel habits  :  Negative   MUSCULOSKELETAL:  See HPI above  NEURO:  NEGATIVE for weakness, dizziness or paresthesias  ENDOCRINE:  NEGATIVE for temperature intolerance, skin/hair changes  HEME/ALLERGY/IMMUNE:  NEGATIVE for bleeding problems  PSYCHIATRIC:  NEGATIVE for changes in mood or affect    PHYSICAL EXAM:  There were no vitals taken for this visit.  There is no height or weight on file to calculate BMI.   GENERAL APPEARANCE: healthy, alert and no distress   SKIN: no suspicious lesions or rashes  NEURO: Normal strength and tone, mentation intact and speech normal  VASCULAR:  good pulses, and  cappillary refill   LYMPH: no lymphadenopathy   PSYCH:  mentation appears normal and affect normal/bright    MSK:  Not in acute distress  Normal gait  Slightly painful yet full range of motion noted on both shoulders  Subtle weakness of external rotation and abduction, right compared to left  Isometric strength itself is well-maintained  Elbow range of motion is well-maintained      IMAGING INTERPRETATION: None taken today  ASSESSMENT:  Some improvement from the cortisone injection to the right shoulder  Underlying partial-thickness rotator cuff tear and impingement along with AC joint DJD    PLAN:  We explained to him that cortisone injection appears to be helping him with pain that he was dealing with.  At the same time the partial-thickness rotator cuff tear will not be healed by a cortisone injection.  It was emphasized that range of motion is important for him to maintain to prevent frozen shoulder.  Some of the exercises that can be painful should be avoided.  It is too early to make a decision as to whether he would consider surgery however, if the pain persist beyond the next 2 to 3 months, he may be a candidate for surgical intervention of decompression and rotator cuff tear repair as needed.  All the questions were answered.  Follow-up as needed.             Damien Amaral MD  Dept. Orthopedic Surgery  [facility]       Disclaimer: This note consists of symbols derived from keyboarding, dictation and/or voice recognition software. As a result, there may be errors in the script that have gone undetected. Please consider this when interpreting information found in this chart.      Again, thank you for allowing me to participate in the care of your patient.        Sincerely,        Damien Amaral MD

## 2020-02-03 ENCOUNTER — MYC REFILL (OUTPATIENT)
Dept: PEDIATRICS | Facility: CLINIC | Age: 80
End: 2020-02-03

## 2020-02-03 DIAGNOSIS — E11.9 TYPE 2 DIABETES MELLITUS WITHOUT COMPLICATION, WITHOUT LONG-TERM CURRENT USE OF INSULIN (H): ICD-10-CM

## 2020-02-03 RX ORDER — GLIMEPIRIDE 1 MG/1
1 TABLET ORAL
Start: 2020-02-03

## 2020-02-03 NOTE — TELEPHONE ENCOUNTER
glimepiride (AMARYL) 1 MG tablet  Last Written Prescription Date:  12/17/19  Last Fill Quantity: 90,  # refills: 3   Last office visit: 12/17/2019 with prescribing provider:  Gabriele  Future Office Visit:   Next 5 appointments (look out 90 days)    Mar 10, 2020  9:00 AM CDT  Return Visit with RH MA LAB  [facility] ([facility]) [address]  [facility] [address]  [phone]   Mar 12, 2020  9:15 AM CDT  Return Visit with Breana Perry MD  [facility] ([facility]) [address]  [facility] [address]  [phone]         Rx Denied. Duplicate/Too Soon. 1 year supply sent on 12/17/19

## 2020-02-07 ENCOUNTER — PATIENT OUTREACH (OUTPATIENT)
Dept: ONCOLOGY | Facility: CLINIC | Age: 80
End: 2020-02-07

## 2020-02-07 NOTE — PROGRESS NOTES
Austin called in to clinic reporting his handicap pass is expiring and he is requesting a new one. Writer will consult with Dr. Perry when she is in clinic next week. Writer has started the application.     Austin is requesting a call once the application is signed and mailed out.     Tosha Burris RN, BSN, [facility]  Patient Care Coordinator  [facility]  [phone]

## 2020-02-11 NOTE — PROGRESS NOTES
Writer mailed out completed disability parking certificate for Austin, signed by Dr. Perry, to pt's home address. Pt to sign form and turn in.     Tosha Burris RN, BSN, [facility]  Patient Care Coordinator  [facility]  [phone]

## 2020-02-17 ENCOUNTER — ONCOLOGY VISIT (OUTPATIENT)
Dept: ONCOLOGY | Facility: CLINIC | Age: 80
End: 2020-02-17
Attending: INTERNAL MEDICINE
Payer: COMMERCIAL

## 2020-02-17 ENCOUNTER — PATIENT OUTREACH (OUTPATIENT)
Dept: ONCOLOGY | Facility: CLINIC | Age: 80
End: 2020-02-17

## 2020-02-17 ENCOUNTER — HOSPITAL ENCOUNTER (OUTPATIENT)
Facility: CLINIC | Age: 80
Setting detail: SPECIMEN
Discharge: HOME | End: 2020-02-17
Attending: INTERNAL MEDICINE | Admitting: INTERNAL MEDICINE
Payer: COMMERCIAL

## 2020-02-17 DIAGNOSIS — C91.10 CLL (CHRONIC LYMPHOCYTIC LEUKEMIA) (H): ICD-10-CM

## 2020-02-17 DIAGNOSIS — C44.329 SQUAMOUS CELL CANCER OF SKIN OF JAWLINE: ICD-10-CM

## 2020-02-17 LAB
ANISOCYTOSIS BLD QL SMEAR: SLIGHT
BASOPHILS # BLD AUTO: 0 10E9/L (ref 0–0.2)
BASOPHILS NFR BLD AUTO: 0 %
DACRYOCYTES BLD QL SMEAR: SLIGHT
DIFFERENTIAL METHOD BLD: ABNORMAL
EOSINOPHIL # BLD AUTO: 1.1 10E9/L (ref 0–0.7)
EOSINOPHIL NFR BLD AUTO: 1 %
ERYTHROCYTE [DISTWIDTH] IN BLOOD BY AUTOMATED COUNT: 17.8 % (ref 10–15)
HCT VFR BLD AUTO: 30.6 % (ref 40–53)
HGB BLD-MCNC: 9.3 G/DL (ref 13.3–17.7)
LYMPHOCYTES # BLD AUTO: 100.3 10E9/L (ref 0.8–5.3)
LYMPHOCYTES NFR BLD AUTO: 90 %
MACROCYTES BLD QL SMEAR: PRESENT
MCH RBC QN AUTO: 33.7 PG (ref 26.5–33)
MCHC RBC AUTO-ENTMCNC: 30.4 G/DL (ref 31.5–36.5)
MCV RBC AUTO: 111 FL (ref 78–100)
MICROCYTES BLD QL SMEAR: PRESENT
MONOCYTES # BLD AUTO: 3.3 10E9/L (ref 0–1.3)
MONOCYTES NFR BLD AUTO: 3 %
NEUTROPHILS # BLD AUTO: 6.7 10E9/L (ref 1.6–8.3)
NEUTROPHILS NFR BLD AUTO: 6 %
PLATELET # BLD AUTO: 120 10E9/L (ref 150–450)
PLATELET # BLD EST: ABNORMAL 10*3/UL
RBC # BLD AUTO: 2.76 10E12/L (ref 4.4–5.9)
VARIANT LYMPHS BLD QL SMEAR: PRESENT
WBC # BLD AUTO: 111.4 10E9/L (ref 4–11)

## 2020-02-17 PROCEDURE — 85025 COMPLETE CBC W/AUTO DIFF WBC: CPT | Performed by: INTERNAL MEDICINE

## 2020-02-17 PROCEDURE — 36415 COLL VENOUS BLD VENIPUNCTURE: CPT

## 2020-02-17 NOTE — LETTER
2/17/2020         RE: Austin Garza  [address] [place] [address]        Dear Colleague,    Thank you for referring your patient, Austin Garza, to the [facility]. Please see a copy of my visit note below.    See nursing notes.    Kimberley Luna, JERI on 2/17/2020 at 1:25 PM      Again, thank you for allowing me to participate in the care of your patient.        Sincerely,         RICCI Lisa

## 2020-02-17 NOTE — PROGRESS NOTES
Lab called critical result for patient .4.     DATE:  2/17/2020   TIME OF RECEIPT FROM LAB:  1:42pm  LAB TEST:  CBC with Plt  LAB VALUE:  .4  RESULTS GIVEN WITH READ-BACK TO (PROVIDER): Dr. Breana Perry      TIME LAB VALUE REPORTED TO PROVIDER:     2/17/20 at 1:48pm    Per Dr. Perry, she'd like him to have his CBC checked again on 2/19/20 prior to his 2/20/20 appointment with her.  Pt notified and will plan to come in on 2/19/20 for CBC.  No further questions or concerns at this time.

## 2020-02-19 ENCOUNTER — PATIENT OUTREACH (OUTPATIENT)
Dept: ONCOLOGY | Facility: CLINIC | Age: 80
End: 2020-02-19

## 2020-02-19 ENCOUNTER — HOSPITAL ENCOUNTER (OUTPATIENT)
Facility: CLINIC | Age: 80
Setting detail: SPECIMEN
Discharge: HOME | End: 2020-02-19
Attending: INTERNAL MEDICINE | Admitting: INTERNAL MEDICINE
Payer: COMMERCIAL

## 2020-02-19 DIAGNOSIS — C91.10 CLL (CHRONIC LYMPHOCYTIC LEUKEMIA) (H): ICD-10-CM

## 2020-02-19 LAB
ANISOCYTOSIS BLD QL SMEAR: SLIGHT
BASOPHILS # BLD AUTO: 0 10E9/L (ref 0–0.2)
BASOPHILS NFR BLD AUTO: 0 %
DIFFERENTIAL METHOD BLD: ABNORMAL
EOSINOPHIL # BLD AUTO: 0 10E9/L (ref 0–0.7)
EOSINOPHIL NFR BLD AUTO: 0 %
ERYTHROCYTE [DISTWIDTH] IN BLOOD BY AUTOMATED COUNT: 17.9 % (ref 10–15)
HCT VFR BLD AUTO: 30.1 % (ref 40–53)
HGB BLD-MCNC: 9.1 G/DL (ref 13.3–17.7)
LYMPHOCYTES # BLD AUTO: 89.4 10E9/L (ref 0.8–5.3)
LYMPHOCYTES NFR BLD AUTO: 90 %
MACROCYTES BLD QL SMEAR: PRESENT
MCH RBC QN AUTO: 34.1 PG (ref 26.5–33)
MCHC RBC AUTO-ENTMCNC: 30.2 G/DL (ref 31.5–36.5)
MCV RBC AUTO: 113 FL (ref 78–100)
MICROCYTES BLD QL SMEAR: PRESENT
MONOCYTES # BLD AUTO: 2 10E9/L (ref 0–1.3)
MONOCYTES NFR BLD AUTO: 2 %
NEUTROPHILS # BLD AUTO: 7.9 10E9/L (ref 1.6–8.3)
NEUTROPHILS NFR BLD AUTO: 8 %
OVALOCYTES BLD QL SMEAR: SLIGHT
PLATELET # BLD AUTO: 103 10E9/L (ref 150–450)
PLATELET # BLD EST: ABNORMAL 10*3/UL
RBC # BLD AUTO: 2.67 10E12/L (ref 4.4–5.9)
WBC # BLD AUTO: 99.3 10E9/L (ref 4–11)

## 2020-02-19 PROCEDURE — 85025 COMPLETE CBC W/AUTO DIFF WBC: CPT | Performed by: INTERNAL MEDICINE

## 2020-02-19 PROCEDURE — 36415 COLL VENOUS BLD VENIPUNCTURE: CPT

## 2020-02-19 NOTE — PROGRESS NOTES
Received a call from [facility] with critical lab value:  WBC = 99.3.    Will route to nursing pool and to LUIZ Scales, for review.  Alysa Campos RN, BSN, OCN, CBCN

## 2020-02-19 NOTE — PROGRESS NOTES
Elizabeth Hi PA-C  You 13 minutes ago (3:20 PM)      No problem. Just keep follow up as scheduled. Ok per Dr Whalen.    Routing comment       You  Elizabeth Hi PA-C 44 minutes ago (2:49 PM)      He's not coming back until April 7th because they are going to be out of town for the majority of March. That's about 7 weeks away.     Routing comment       Elizabeth Hi PA-C Nielsen, Karla, SHAMIR;  Cancer [facility] Rn Pool 46 minutes ago (2:47 PM)      Nothing to do right now. Please schedule with [place] in about 4-6 weeks.     Elizabeth    Routing comment       Alysa Campos, Elizabeth Granado PA-C;  Cancer [facility] Rn Pool 1 hour ago (2:21 PM)      Elizabeth--     I received a critical lab value for pt. Can you please review in the absence of Dr Perry?   Louise,   Alysa     Routing comment

## 2020-02-24 ENCOUNTER — TELEPHONE (OUTPATIENT)
Dept: ONCOLOGY | Facility: CLINIC | Age: 80
End: 2020-02-24

## 2020-02-24 NOTE — TELEPHONE ENCOUNTER
Pt calling to review recent labs results with RN. He had a follow up with  that was cancelled d/t MD illness.     He would like to know if there is anything to be concerned about being he had to re-test his blood.    He would also like to know if he is OK to go on a month long vacation to [place], 3/7-4/7.     Please call back at [phone]. May leave detailed message.

## 2020-02-25 NOTE — TELEPHONE ENCOUNTER
Breana Perry MD Anderson, Amanda E, RN; P  Cancer    Cc: P  Cancer Clinic Rn Pool   Caller: Unspecified (Yesterday,  3:02 PM)             I spoke to Austin on the phone.  He should be fine to go on vacation.  However, I asked him to get his labs check again next week.       Scheduling: Please call patient to schedule lab some time next week.      Tosha Burris RN, BSN, [facility]  Patient Care Coordinator  [facility]  [phone]

## 2020-02-25 NOTE — TELEPHONE ENCOUNTER
Austin called in asking about his lab results and follow up as he is leaving for vacation to [place] on March 8th. Writer will follow up with Dr. Perry and call Austin back on his home phone number. Per pt, ok to leave a message.     Tosha Burris, RN, BSN, [facility]  Patient Care Coordinator  [facility]  [phone]

## 2020-03-03 ENCOUNTER — PATIENT OUTREACH (OUTPATIENT)
Dept: ONCOLOGY | Facility: CLINIC | Age: 80
End: 2020-03-03

## 2020-03-03 DIAGNOSIS — C91.10 CLL (CHRONIC LYMPHOCYTIC LEUKEMIA) (H): Primary | ICD-10-CM

## 2020-03-03 DIAGNOSIS — C91.10 CLL (CHRONIC LYMPHOCYTIC LEUKEMIA) (H): ICD-10-CM

## 2020-03-03 LAB
DACRYOCYTES BLD QL SMEAR: SLIGHT
DIFFERENTIAL METHOD BLD: ABNORMAL
ERYTHROCYTE [DISTWIDTH] IN BLOOD BY AUTOMATED COUNT: 17.7 % (ref 10–15)
HCT VFR BLD AUTO: 26.5 % (ref 40–53)
HGB BLD-MCNC: 8.2 G/DL (ref 13.3–17.7)
LYMPHOCYTES # BLD AUTO: 69.2 10E9/L (ref 0.8–5.3)
LYMPHOCYTES NFR BLD AUTO: 91 %
MCH RBC QN AUTO: 34.3 PG (ref 26.5–33)
MCHC RBC AUTO-ENTMCNC: 30.9 G/DL (ref 31.5–36.5)
MCV RBC AUTO: 111 FL (ref 78–100)
MONOCYTES # BLD AUTO: 2.3 10E9/L (ref 0–1.3)
MONOCYTES NFR BLD AUTO: 3 %
NEUTROPHILS # BLD AUTO: 4.6 10E9/L (ref 1.6–8.3)
NEUTROPHILS NFR BLD AUTO: 6 %
PLATELET # BLD AUTO: 92 10E9/L (ref 150–450)
PLATELET # BLD EST: ABNORMAL 10*3/UL
POIKILOCYTOSIS BLD QL SMEAR: SLIGHT
POLYCHROMASIA BLD QL SMEAR: ABNORMAL
RBC # BLD AUTO: 2.39 10E12/L (ref 4.4–5.9)
WBC # BLD AUTO: 76.1 10E9/L (ref 4–11)

## 2020-03-03 PROCEDURE — 85025 COMPLETE CBC W/AUTO DIFF WBC: CPT | Performed by: INTERNAL MEDICINE

## 2020-03-03 PROCEDURE — 36415 COLL VENOUS BLD VENIPUNCTURE: CPT | Performed by: INTERNAL MEDICINE

## 2020-03-03 NOTE — PROGRESS NOTES
Received a call from [facility] with a critical lab value:    WBC = 76.05    Hgb =  8.2    Platelet = 92    [facility] lab states that the machine was not able to process the neutrophils or the lymphocytes. They are requested an order for morphology to send out.    Will route to Dr Perry and to nursing pool for follow-up.  Alysa Campos, RN, BSN, OCN, CBCN

## 2020-03-04 NOTE — PROGRESS NOTES
Breana Perry MD Nielsen, Karla, RN;  Cancer Clinic Rn Pool 20 hours ago (11:58 AM)      I've add on a morphology order     Please see result note from [facility] on 3/3/20 for phone call details.    Tosha Burris, RN, BSN, [facility]  Patient Care Coordinator  [facility]  [phone]

## 2020-04-06 ENCOUNTER — HOSPITAL ENCOUNTER (OUTPATIENT)
Facility: CLINIC | Age: 80
Setting detail: SPECIMEN
Discharge: HOME | End: 2020-04-06
Attending: INTERNAL MEDICINE | Admitting: INTERNAL MEDICINE
Payer: COMMERCIAL

## 2020-04-06 ENCOUNTER — PATIENT OUTREACH (OUTPATIENT)
Dept: ONCOLOGY | Facility: CLINIC | Age: 80
End: 2020-04-06

## 2020-04-06 DIAGNOSIS — C91.10 CLL (CHRONIC LYMPHOCYTIC LEUKEMIA) (H): ICD-10-CM

## 2020-04-06 LAB
ANISOCYTOSIS BLD QL SMEAR: SLIGHT
BASOPHILS # BLD AUTO: 0 10E9/L (ref 0–0.2)
BASOPHILS NFR BLD AUTO: 0 %
DACRYOCYTES BLD QL SMEAR: SLIGHT
DIFFERENTIAL METHOD BLD: ABNORMAL
EOSINOPHIL # BLD AUTO: 0 10E9/L (ref 0–0.7)
EOSINOPHIL NFR BLD AUTO: 0 %
ERYTHROCYTE [DISTWIDTH] IN BLOOD BY AUTOMATED COUNT: 17.4 % (ref 10–15)
HCT VFR BLD AUTO: 28.4 % (ref 40–53)
HGB BLD-MCNC: 8.7 G/DL (ref 13.3–17.7)
LYMPHOCYTES # BLD AUTO: 99.1 10E9/L (ref 0.8–5.3)
LYMPHOCYTES NFR BLD AUTO: 92 %
MACROCYTES BLD QL SMEAR: PRESENT
MCH RBC QN AUTO: 33.9 PG (ref 26.5–33)
MCHC RBC AUTO-ENTMCNC: 30.6 G/DL (ref 31.5–36.5)
MCV RBC AUTO: 111 FL (ref 78–100)
MICROCYTES BLD QL SMEAR: PRESENT
MONOCYTES # BLD AUTO: 2.2 10E9/L (ref 0–1.3)
MONOCYTES NFR BLD AUTO: 2 %
NEUTROPHILS # BLD AUTO: 6.5 10E9/L (ref 1.6–8.3)
NEUTROPHILS NFR BLD AUTO: 6 %
OVALOCYTES BLD QL SMEAR: SLIGHT
PLATELET # BLD AUTO: 107 10E9/L (ref 150–450)
PLATELET # BLD EST: ABNORMAL 10*3/UL
RBC # BLD AUTO: 2.57 10E12/L (ref 4.4–5.9)
WBC # BLD AUTO: 107.7 10E9/L (ref 4–11)

## 2020-04-06 PROCEDURE — 85025 COMPLETE CBC W/AUTO DIFF WBC: CPT | Performed by: INTERNAL MEDICINE

## 2020-04-06 PROCEDURE — 36415 COLL VENOUS BLD VENIPUNCTURE: CPT

## 2020-04-06 NOTE — PROGRESS NOTES
Patient had labs drawn here today by AYDE Conrad from lab is calling to report the following critical lab result:    WBC = 107.7    Writer routed this message to Dr. Perry and RN [facility] to review.    Appointment with Dr. Perry on 4/7/20.    Cristin Pillai RN, BSN, OCN on 4/6/2020 at 11:50 AM

## 2020-04-07 ENCOUNTER — VIRTUAL VISIT (OUTPATIENT)
Dept: ONCOLOGY | Facility: CLINIC | Age: 80
End: 2020-04-07
Attending: INTERNAL MEDICINE
Payer: COMMERCIAL

## 2020-04-07 DIAGNOSIS — C91.10 CLL (CHRONIC LYMPHOCYTIC LEUKEMIA) (H): Primary | ICD-10-CM

## 2020-04-07 PROCEDURE — 99214 OFFICE O/P EST MOD 30 MIN: CPT | Mod: 95 | Performed by: INTERNAL MEDICINE

## 2020-04-07 NOTE — PROGRESS NOTES
"Austin Garza is a 79 year old male who is being evaluated via a billable video visit.      The patient has been notified of following:     \"This video visit will be conducted via a call between you and your physician/provider. We have found that certain health care needs can be provided without the need for an in-person physical exam.  This service lets us provide the care you need with a video conversation.  If a prescription is necessary we can send it directly to your pharmacy.  If lab work is needed we can place an order for that and you can then stop by our lab to have the test done at a later time.    Video visits are billed at different rates depending on your insurance coverage.  Please reach out to your insurance provider with any questions.    If during the course of the call the physician/provider feels a video visit is not appropriate, you will not be charged for this service.\"    Patient has given verbal consent for Video visit? Yes    Patient would like the video invitation sent by: Send to e-mail at: [email or web address]    Video Start Time: 1:43 pm    Austin Garza complains of    Chief Complaint   Patient presents with     Oncology Clinic Visit     CLL (chronic lymphocytic leukemia)        I have reviewed and updated the patient's Past Medical History, Social History, Family History and Medication List.    ALLERGIES  Ace inhibitors        Video-Visit Details    Type of service:  Video Visit    Video End Time (time video stopped): 1:58 pm    Originating Location (pt. Location): Home    Distant Location (provider location):  [facility]     Mode of Communication:  Video Conference via [facility]    Hematology/Oncology Established Patient Note      Today's Date: 4/07/20    Reason for Follow-up: CLL      HISTORY OF PRESENT ILLNESS: Austin Garza is a 79 year old male with PMHx of DMII, HTN who presented with leukocytosis.  In November 2017, he " was found to have elevated WBC of 61.7K, hemoglobin of 10.4, and platelet of 115K.  There were no other CBC results available between 2010 and 2017.  Prior to 2010, he had normal CBC, with normal to mild anemia, and mild thrombocytopenia.   He was asymptomatic.    He underwent bone marrow biopsy on 11/21/17, which was consistent with chronic lymphocytic leukemia/small lymphocytic lymphoma, with 13% ringed sideroblasts identified.  The presence of increased numbers of ringed sideroblasts raises the possibility of an associated myelodysplastic syndrome.  Dysplastic changes are not identified though.  Many cases exhibiting ringed sideroblasts are metabolic origin, without significant risk of progression to acute leukemia, and these typically do not show dysplastic morphology as is the case with this specimen.  15% ringed sideroblasts are required for a diagnosis for RARS, which this specimen does not meet.  Flow cytometry is also consistent with CLL/SLL.  Cytogenetics show two (10%) of the metaphase cells comprise a clone characterized by a deletion within the proximal long arm of a chromosome 13 as the sole karyotypic abnormality.  Three (15%) metaphases had loss of the Y chromosome as the sole abnormality.    CT scan on 11/29/17 shows mildly enlarged left axillary lymph node and periaortic lymph nodes in the retroperitoneum of the abdomen.  Spleen is also enlarged.    On his staging CT scan for CLL, he was incidentally found to have a large infrarenal abdominal aortic aneurysm, measuring 7.6 cm.  He was seen by Dr. Thomas, and he underwent EVAR on 12/4/17.       INTERIM HISTORY: Austin says that he feels well.  He notes feeling more fatigued, but attributes that to social distancing and being home all the time.  He hasn't been able to go to the [facility] to exercise, although he does take walks outside.  He is in his recliner a lot at home, so hasn't been very active.            REVIEW OF SYSTEMS:   14 point ROS was  reviewed and is negative other than as noted above in HPI.       HOME MEDICATIONS:  Current Outpatient Medications   Medication Sig Dispense Refill     aspirin (ASA) 81 MG tablet Take 1 tablet (81 mg) by mouth every evening 90 tablet 3     clopidogrel (PLAVIX) 75 MG tablet Take 1 tablet (75 mg) by mouth daily NEEDS VASCULAR FOLLOW UP IN MAY, 2020 90 tablet 1     glimepiride (AMARYL) 1 MG tablet Take 1 tablet (1 mg) by mouth every morning (before breakfast) 90 tablet 3     losartan-hydrochlorothiazide (HYZAAR) 100-25 MG tablet Take 1 tablet by mouth daily 90 tablet 3     metFORMIN (GLUCOPHAGE) 1000 MG tablet TAKE 1 TABLET BY MOUTH TWICE A DAY WITH FOOD 180 tablet 3     Multiple Vitamins-Minerals (CENTRUM SILVER) per tablet Take 1 tablet by mouth daily       atorvastatin (LIPITOR) 20 MG tablet Take 1 tablet (20 mg) by mouth At Bedtime (Patient not taking: Reported on 4/7/2020) 90 tablet 3         ALLERGIES:  Allergies   Allergen Reactions     Ace Inhibitors      cough         PAST MEDICAL HISTORY:  Past Medical History:   Diagnosis Date     AAA (abdominal aortic aneurysm)      BCC (basal cell carcinoma of skin) - face      CLL (chronic lymphocytic leukemia)      Diaphragmatic hernia      Diverticulitis of colon      Esophageal reflux      Essential hypertension      Hyperlipidemia      Irritable bowel syndrome      Macular degeneration (senile) of retina      Squamous Cell Carcinoma, Back 1988     Type 2 diabetes mellitus          PAST SURGICAL HISTORY:  Past Surgical History:   Procedure Laterality Date     APPENDECTOMY OPEN  1966     BONE MARROW BIOPSY, BONE SPECIMEN, NEEDLE/TROCAR N/A 11/21/2017    Procedure: BIOPSY BONE MARROW;  BONE MARROW BIOPSY;  Surgeon: Shady Garcia MD;  Location:  GI     COLONOSCOPY  2002     ENDOVASCULAR REPAIR ANEURYSM ABDOMINAL AORTA N/A 12/4/2017    Procedure: ENDOVASCULAR REPAIR ANEURYSM ABDOMINAL AORTA;  ENDOVASCULAR REPAIR ANEURYSM ABDOMINAL AORTA;  Surgeon: Milton Cazares  MD Michael;  Location: [facility] OR     Fistulotomy with marsupialization for repair of fisture in ano       IR ABDOMINAL AORTOGRAM  3/11/2019     IR VISCERAL EMBOLIZATION  2019     Multiple skin tags - resection  1998     Squamous cell skin cancer resection - back           SOCIAL HISTORY:  Social History     Socioeconomic History     Marital status:      Spouse name: librado     Number of children: 0     Years of education: 17     Highest education level: Not on file   Occupational History     Occupation: retired   Social Needs     Financial resource strain: Not on file     Food insecurity     Worry: Not on file     Inability: Not on file     Transportation needs     Medical: Not on file     Non-medical: Not on file   Tobacco Use     Smoking status: Former Smoker     Packs/day: 0.50     Years: 20.00     Pack years: 10.00     Last attempt to quit: 1975     Years since quittin.2     Smokeless tobacco: Former User   Substance and Sexual Activity     Alcohol use: Yes     Alcohol/week: 10.0 - 14.0 standard drinks     Types: 10 - 14 Standard drinks or equivalent per week     Comment: 2 drinks a day/wine     Drug use: No     Sexual activity: Never   Lifestyle     Physical activity     Days per week: Not on file     Minutes per session: Not on file     Stress: Not on file   Relationships     Social connections     Talks on phone: Not on file     Gets together: Not on file     Attends Quaker service: Not on file     Active member of club or organization: Not on file     Attends meetings of clubs or organizations: Not on file     Relationship status: Not on file     Intimate partner violence     Fear of current or ex partner: Not on file     Emotionally abused: Not on file     Physically abused: Not on file     Forced sexual activity: Not on file   Other Topics Concern     Parent/sibling w/ CABG, MI or angioplasty before 65F 55M? Not Asked   Social History Narrative     Not on file   He quit  smoking in .  He drinks 1-3 glasses of wine a night with dinner.  He is retired, and used to work as a .  He lives with his wife in [place].  His cousin had lung cancer and  around age 69.  Otherwise, he denies family history of cancer.        FAMILY HISTORY:  Family History   Problem Relation Age of Onset     Cerebrovascular Disease Father         x 2     Hypertension Mother      C.A.D. Mother      Cancer Mother         skin     Eye Disorder Mother         glaucoma     Prostate Cancer No family hx of      Cancer - colorectal No family hx of      Glaucoma No family hx of      Macular Degeneration No family hx of          PHYSICAL EXAM:  Vital signs:  There were no vitals taken for this visit.   Video visit.  Exam not done.      LABS:  CBC RESULTS:   Recent Labs   Lab Test 20  1126   .7*   RBC 2.57*   HGB 8.7*   HCT 28.4*   *   MCH 33.9*   MCHC 30.6*   RDW 17.4*   *         ASSESSMENT/PLAN:  Austin Garza is a 79 year old male with:    1) CLL/SLL: BLACKWOOD stage III, with lymphocytosis, lymphadenopathy, splenomegaly, and anemia.  He has mild thrombocytopenia as well, but is above 100K.  He presented with leukocytosis with WBC of 61.7K, hemoglobin of 10.4, and platelet count of 115K on diagnosis.  Bone marrow biopsy and flow cytometry confirmed CLL/SLL.  CT scan shows mildly enlarged left axillary lymph node and periaortic lymph nodes in the retroperitoneum of the abdomen, with enlarged spleen.      CBC from 20 reviewed with Austin.  WBC is 107.7K, which is higher than his baseline range, although it has been in this range before.  Platelet count and hemoglobin remain in his baseline range.       He has symptoms of fatigue and sweats around his neck at night.  Elevated lymphocyte count is not necessarily an indication to start treatment.  However, I discussed that if these symptoms are thought to be related to the CLL and if his lymphocyte count continues to rise rapidly or  if his cytopenias worsen, this may warrant starting treatment.  He says that he would be okay with that if it is needed.     WBC did rise some from last month.  We discussed options of continued observation versus starting treatment for CLL.  He is fatigued, but attributes that to inactivity.  He otherwise has no new symptoms.      We agreed to repeat his CBC in ~2-3 weeks.  If his lymphocyte count continues to rise, we will discuss starting treatment at that time.  He is agreeable with this.      -We usually transfuse for <7, but previously, he was symptomatic with fatigue at around 8.1.  He got a blood transfusion and felt much better.  We agreed on transfusion parameter of hemoglobin <8.    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 He completed radiation at [facility] with planned 60 Gy x 30 fractions. He has completed radiation and noted that he did not need any more follow-up for this.    3) Abdominal aortic aneurysm: measures up to 7.6 cm on CT scan, incidentally found.  He underwent EVAR on 12/4/17.  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  He is on Plavix now.  On 5/13/19, he underwent and percutaneous aneurysm sac puncture and endo leak embolization by IR on 5/13/19.  -follow-up with vascular surgery and IR    4) Diabetes, hypertension:  -following with PCP      Breana Perry MD  Hematology/Oncology  [facility]

## 2020-04-10 NOTE — PATIENT INSTRUCTIONS
Labs scheduled 4/23  Appt with Dr. Perry scheduled 4/28  Tosha Burris, RN, BSN, [facility]  Patient Care Coordinator  [facility]  [phone]

## 2020-04-23 ENCOUNTER — HOSPITAL ENCOUNTER (OUTPATIENT)
Facility: CLINIC | Age: 80
Setting detail: SPECIMEN
Discharge: HOME | End: 2020-04-23
Attending: INTERNAL MEDICINE | Admitting: INTERNAL MEDICINE
Payer: COMMERCIAL

## 2020-04-23 ENCOUNTER — PATIENT OUTREACH (OUTPATIENT)
Dept: ONCOLOGY | Facility: CLINIC | Age: 80
End: 2020-04-23

## 2020-04-23 DIAGNOSIS — C91.10 CLL (CHRONIC LYMPHOCYTIC LEUKEMIA) (H): ICD-10-CM

## 2020-04-23 LAB
ALBUMIN SERPL-MCNC: 4.3 G/DL (ref 3.4–5)
ALP SERPL-CCNC: 52 U/L (ref 40–150)
ALT SERPL W P-5'-P-CCNC: 23 U/L (ref 0–70)
ANION GAP SERPL CALCULATED.3IONS-SCNC: 5 MMOL/L (ref 3–14)
ANISOCYTOSIS BLD QL SMEAR: SLIGHT
AST SERPL W P-5'-P-CCNC: 17 U/L (ref 0–45)
BASOPHILS # BLD AUTO: 0 10E9/L (ref 0–0.2)
BASOPHILS NFR BLD AUTO: 0 %
BILIRUB SERPL-MCNC: 0.5 MG/DL (ref 0.2–1.3)
BUN SERPL-MCNC: 39 MG/DL (ref 7–30)
CALCIUM SERPL-MCNC: 9.3 MG/DL (ref 8.5–10.1)
CHLORIDE SERPL-SCNC: 103 MMOL/L (ref 94–109)
CO2 SERPL-SCNC: 28 MMOL/L (ref 20–32)
CREAT SERPL-MCNC: 1.75 MG/DL (ref 0.66–1.25)
DACRYOCYTES BLD QL SMEAR: SLIGHT
DIFFERENTIAL METHOD BLD: ABNORMAL
EOSINOPHIL # BLD AUTO: 0 10E9/L (ref 0–0.7)
EOSINOPHIL NFR BLD AUTO: 0 %
ERYTHROCYTE [DISTWIDTH] IN BLOOD BY AUTOMATED COUNT: 17.4 % (ref 10–15)
GFR SERPL CREATININE-BSD FRML MDRD: 37 ML/MIN/{1.73_M2}
GLUCOSE SERPL-MCNC: 263 MG/DL (ref 70–99)
HCT VFR BLD AUTO: 27.1 % (ref 40–53)
HGB BLD-MCNC: 8 G/DL (ref 13.3–17.7)
LDH SERPL L TO P-CCNC: 203 U/L (ref 85–227)
LYMPHOCYTES # BLD AUTO: 91.7 10E9/L (ref 0.8–5.3)
LYMPHOCYTES NFR BLD AUTO: 94 %
MACROCYTES BLD QL SMEAR: PRESENT
MCH RBC QN AUTO: 33.2 PG (ref 26.5–33)
MCHC RBC AUTO-ENTMCNC: 29.5 G/DL (ref 31.5–36.5)
MCV RBC AUTO: 112 FL (ref 78–100)
MICROCYTES BLD QL SMEAR: PRESENT
MONOCYTES # BLD AUTO: 1 10E9/L (ref 0–1.3)
MONOCYTES NFR BLD AUTO: 1 %
NEUTROPHILS # BLD AUTO: 4.9 10E9/L (ref 1.6–8.3)
NEUTROPHILS NFR BLD AUTO: 5 %
OVALOCYTES BLD QL SMEAR: SLIGHT
PLATELET # BLD AUTO: 83 10E9/L (ref 150–450)
PLATELET # BLD EST: ABNORMAL 10*3/UL
POTASSIUM SERPL-SCNC: 4.4 MMOL/L (ref 3.4–5.3)
PROT SERPL-MCNC: 7.3 G/DL (ref 6.8–8.8)
RBC # BLD AUTO: 2.41 10E12/L (ref 4.4–5.9)
SODIUM SERPL-SCNC: 136 MMOL/L (ref 133–144)
WBC # BLD AUTO: 97.6 10E9/L (ref 4–11)

## 2020-04-23 PROCEDURE — 36415 COLL VENOUS BLD VENIPUNCTURE: CPT

## 2020-04-23 PROCEDURE — 83615 LACTATE (LD) (LDH) ENZYME: CPT | Performed by: INTERNAL MEDICINE

## 2020-04-23 PROCEDURE — 85025 COMPLETE CBC W/AUTO DIFF WBC: CPT | Performed by: INTERNAL MEDICINE

## 2020-04-23 PROCEDURE — 80053 COMPREHEN METABOLIC PANEL: CPT | Performed by: INTERNAL MEDICINE

## 2020-04-23 NOTE — PROGRESS NOTES
Lab notified writer of Austin's critical WBC value from today:     WBC 97.6    Writer will notify Dr. Perry.    Tosha Burris RN, BSN, [facility]  Patient Care Coordinator  [facility]  [phone]

## 2020-04-24 ENCOUNTER — TRANSFERRED RECORDS (OUTPATIENT)
Dept: HEALTH INFORMATION MANAGEMENT | Facility: CLINIC | Age: 80
End: 2020-04-24

## 2020-04-28 ENCOUNTER — TELEPHONE (OUTPATIENT)
Dept: ONCOLOGY | Facility: CLINIC | Age: 80
End: 2020-04-28

## 2020-04-28 ENCOUNTER — VIRTUAL VISIT (OUTPATIENT)
Dept: ONCOLOGY | Facility: CLINIC | Age: 80
End: 2020-04-28
Attending: INTERNAL MEDICINE
Payer: COMMERCIAL

## 2020-04-28 DIAGNOSIS — C91.10 CLL (CHRONIC LYMPHOCYTIC LEUKEMIA) (H): Primary | ICD-10-CM

## 2020-04-28 PROCEDURE — 99214 OFFICE O/P EST MOD 30 MIN: CPT | Mod: 95 | Performed by: INTERNAL MEDICINE

## 2020-04-28 RX ORDER — PROCHLORPERAZINE MALEATE 10 MG
10 TABLET ORAL EVERY 6 HOURS PRN
Qty: 30 TABLET | Refills: 2 | Status: CANCELLED | OUTPATIENT
Start: 2020-05-04

## 2020-04-28 NOTE — PROGRESS NOTES
"Austin Garza is a 79 year old male who is being evaluated via a billable video visit.      The patient has been notified of following:     \"This video visit will be conducted via a call between you and your physician/provider. We have found that certain health care needs can be provided without the need for an in-person physical exam.  This service lets us provide the care you need with a video conversation.  If a prescription is necessary we can send it directly to your pharmacy.  If lab work is needed we can place an order for that and you can then stop by our lab to have the test done at a later time.    Video visits are billed at different rates depending on your insurance coverage.  Please reach out to your insurance provider with any questions.    If during the course of the call the physician/provider feels a video visit is not appropriate, you will not be charged for this service.\"    Patient has given verbal consent for Video visit? Yes    How would you like to obtain your AVS? Isidoro    Patient would like the video invitation sent by: Text to cell phone: [phone]    Will anyone else be joining your video visit? No    Geetha Benítez CMA      Video-Visit Details    Type of service:  Video Visit    Video Start Time: 2:59 PM  Video End Time: 3:09 PM    Originating Location (pt. Location): Home    Distant Location (provider location):  [facility]     Mode of Communication:  Video Conference via [facility]    Hematology/Oncology Established Patient Note      Today's Date: 4/28/20    Reason for Follow-up: CLL      HISTORY OF PRESENT ILLNESS: Austin Garza is a 79 year old male with PMHx of DMII, HTN who presented with leukocytosis.  In November 2017, he was found to have elevated WBC of 61.7K, hemoglobin of 10.4, and platelet of 115K.  There were no other CBC results available between 2010 and 2017.  Prior to 2010, he had normal CBC, with normal " to mild anemia, and mild thrombocytopenia.   He was asymptomatic.    He underwent bone marrow biopsy on 11/21/17, which was consistent with chronic lymphocytic leukemia/small lymphocytic lymphoma, with 13% ringed sideroblasts identified.  The presence of increased numbers of ringed sideroblasts raises the possibility of an associated myelodysplastic syndrome.  Dysplastic changes are not identified though.  Many cases exhibiting ringed sideroblasts are metabolic origin, without significant risk of progression to acute leukemia, and these typically do not show dysplastic morphology as is the case with this specimen.  15% ringed sideroblasts are required for a diagnosis for RARS, which this specimen does not meet.  Flow cytometry is also consistent with CLL/SLL.  Cytogenetics show two (10%) of the metaphase cells comprise a clone characterized by a deletion within the proximal long arm of a chromosome 13 as the sole karyotypic abnormality.  Three (15%) metaphases had loss of the Y chromosome as the sole abnormality.    CT scan on 11/29/17 shows mildly enlarged left axillary lymph node and periaortic lymph nodes in the retroperitoneum of the abdomen.  Spleen is also enlarged.    On his staging CT scan for CLL, he was incidentally found to have a large infrarenal abdominal aortic aneurysm, measuring 7.6 cm.  He was seen by Dr. Thomas, and he underwent EVAR on 12/4/17.       INTERIM HISTORY: Austin says that he feels about the same.  He denies fever/chills/night sweats.          REVIEW OF SYSTEMS:   14 point ROS was reviewed and is negative other than as noted above in HPI.       HOME MEDICATIONS:  Current Outpatient Medications   Medication Sig Dispense Refill     aspirin (ASA) 81 MG tablet Take 1 tablet (81 mg) by mouth every evening (Patient taking differently: Take 325 mg by mouth every evening ) 90 tablet 3     atorvastatin (LIPITOR) 20 MG tablet Take 1 tablet (20 mg) by mouth At Bedtime 90 tablet 3     clopidogrel  (PLAVIX) 75 MG tablet Take 1 tablet (75 mg) by mouth daily NEEDS VASCULAR FOLLOW UP IN MAY, 2020 90 tablet 1     glimepiride (AMARYL) 1 MG tablet Take 1 tablet (1 mg) by mouth every morning (before breakfast) 90 tablet 3     losartan-hydrochlorothiazide (HYZAAR) 100-25 MG tablet Take 1 tablet by mouth daily 90 tablet 3     metFORMIN (GLUCOPHAGE) 1000 MG tablet TAKE 1 TABLET BY MOUTH TWICE A DAY WITH FOOD 180 tablet 3     Multiple Vitamins-Minerals (CENTRUM SILVER) per tablet Take 1 tablet by mouth daily           ALLERGIES:  Allergies   Allergen Reactions     Ace Inhibitors      cough         PAST MEDICAL HISTORY:  Past Medical History:   Diagnosis Date     AAA (abdominal aortic aneurysm)      BCC (basal cell carcinoma of skin) - face      CLL (chronic lymphocytic leukemia)      Diaphragmatic hernia      Diverticulitis of colon      Esophageal reflux      Essential hypertension      Hyperlipidemia      Irritable bowel syndrome      Macular degeneration (senile) of retina      Squamous Cell Carcinoma, Back 1988     Type 2 diabetes mellitus          PAST SURGICAL HISTORY:  Past Surgical History:   Procedure Laterality Date     APPENDECTOMY OPEN  1966     BONE MARROW BIOPSY, BONE SPECIMEN, NEEDLE/TROCAR N/A 11/21/2017    Procedure: BIOPSY BONE MARROW;  BONE MARROW BIOPSY;  Surgeon: Shady Garcia MD;  Location:  GI     COLONOSCOPY  2002     ENDOVASCULAR REPAIR ANEURYSM ABDOMINAL AORTA N/A 12/4/2017    Procedure: ENDOVASCULAR REPAIR ANEURYSM ABDOMINAL AORTA;  ENDOVASCULAR REPAIR ANEURYSM ABDOMINAL AORTA;  Surgeon: Milton Cazares MD;  Location:  OR     Fistulotomy with marsupialization for repair of fisture in ano  2007     IR ABDOMINAL AORTOGRAM  3/11/2019     IR VISCERAL EMBOLIZATION  5/13/2019     Multiple skin tags - resection  1998     Squamous cell skin cancer resection - back  1985-90         SOCIAL HISTORY:  Social History     Socioeconomic History     Marital status:      Spouse name:  librado     Number of children: 0     Years of education: 17     Highest education level: Not on file   Occupational History     Occupation: retired   Social Needs     Financial resource strain: Not on file     Food insecurity     Worry: Not on file     Inability: Not on file     Transportation needs     Medical: Not on file     Non-medical: Not on file   Tobacco Use     Smoking status: Former Smoker     Packs/day: 0.50     Years: 20.00     Pack years: 10.00     Last attempt to quit: 1975     Years since quittin.3     Smokeless tobacco: Former User   Substance and Sexual Activity     Alcohol use: Yes     Alcohol/week: 10.0 - 14.0 standard drinks     Types: 10 - 14 Standard drinks or equivalent per week     Comment: 2 drinks a day/wine     Drug use: No     Sexual activity: Never   Lifestyle     Physical activity     Days per week: Not on file     Minutes per session: Not on file     Stress: Not on file   Relationships     Social connections     Talks on phone: Not on file     Gets together: Not on file     Attends Bahai service: Not on file     Active member of club or organization: Not on file     Attends meetings of clubs or organizations: Not on file     Relationship status: Not on file     Intimate partner violence     Fear of current or ex partner: Not on file     Emotionally abused: Not on file     Physically abused: Not on file     Forced sexual activity: Not on file   Other Topics Concern     Parent/sibling w/ CABG, MI or angioplasty before 65F 55M? Not Asked   Social History Narrative     Not on file   He quit smoking in .  He drinks 1-3 glasses of wine a night with dinner.  He is retired, and used to work as a .  He lives with his wife in [place].  His cousin had lung cancer and  around age 69.  Otherwise, he denies family history of cancer.        FAMILY HISTORY:  Family History   Problem Relation Age of Onset     Cerebrovascular Disease Father         x 2     Hypertension Mother       TULIOARAMIN. Mother      Cancer Mother         skin     Eye Disorder Mother         glaucoma     Prostate Cancer No family hx of      Cancer - colorectal No family hx of      Glaucoma No family hx of      Macular Degeneration No family hx of          PHYSICAL EXAM:  Vital signs:  There were no vitals taken for this visit.   ECO  GENERAL/CONSTITUTIONAL: No acute distress. Healthy, alert.  EYES: No scleral icterus.  No redness or discharge.    RESPIRATORY: No audible wheeze, cough, or visible cyanosis.  No visible retractions or increased work of breathing.  Able to speak fully in complete sentences.  MUSCULOSKELETAL: Normal range of motion.  NEUROLOGIC: Alert, oriented, answers questions appropriately. No tremor. No headache. Mentation intact and speech normal  INTEGUMENTARY: No jaundice.  No obvious rash or skin lesions.  PSYCHIATRIC:  Mentation appears normal, affect normal/bright, judgement and insight intact, normal speech and appearance well-groomed.    The rest of a comprehensive physical exam is deferred due to public [facility] emergency video visit restrictions.        LABS:  ,CBC RESULTS:   Recent Labs   Lab Test 20  1123   WBC 97.6*   RBC 2.41*   HGB 8.0*   HCT 27.1*   *   MCH 33.2*   MCHC 29.5*   RDW 17.4*   PLT 83*         ASSESSMENT/PLAN:  Austin Garza is a 79 year old male with:    1) CLL/SLL: BLACKWOOD stage III, with lymphocytosis, lymphadenopathy, splenomegaly, and anemia.  He has mild thrombocytopenia as well, but is above 100K.  He presented with leukocytosis with WBC of 61.7K, hemoglobin of 10.4, and platelet count of 115K on diagnosis.  Bone marrow biopsy and flow cytometry confirmed CLL/SLL.  CT scan shows mildly enlarged left axillary lymph node and periaortic lymph nodes in the retroperitoneum of the abdomen, with enlarged spleen.      CBC from 20 reviewed with Austin.  WBC is 97.6K, which is a bit lower than 3 weeks ago, but remains quite elevated.  His hemoglobin and platelet  count remain decreased.    We discussed that his lymphocyte count has been rising, and he remains quite anemic and thrombocytopenic.  He does get fatigue and sweats at night.  We discussed starting treatment, and he is agreeable with this.      -plan to start ibrutinib.  We discussed potential side effects, including bleeding risk.  He is not on warfarin and has no history of arrhythmias.  He is on aspirin and Plavix though.  I discussed with pharmacy.  There is no contraindication, but we will monitor closely for bleeding.    -I discussed the schedule, regimen, dose, possible side effects, the benefits and risks, and patient agrees to treatment plan.  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 He completed radiation at [facility] with planned 60 Gy x 30 fractions. He has completed radiation and noted that he did not need any more follow-up for this.    3) Abdominal aortic aneurysm: measures up to 7.6 cm on CT scan, incidentally found.  He underwent EVAR on 12/4/17.  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  He is on Plavix now.  On 5/13/19, he underwent and percutaneous aneurysm sac puncture and endo leak embolization by IR on 5/13/19.  -follow-up with vascular surgery and IR    4) Diabetes, hypertension:  -following with PCP      Breana Perry MD  Hematology/Oncology  [facility]

## 2020-04-28 NOTE — ORAL ONC MGMT
Oral Chemotherapy Monitoring Program    Primary Oncologist: Dr. Perry  Primary Oncology Clinic: [facility]  Cancer Diagnosis: CLL    Drug: ibrutinib 420 mg PO daily  Start Date: as soon as possible  Dose is appropriate for patients: renal/ Hepatic Function   Expected duration of therapy: Until disease progression or unacceptable toxicity    Drug Interaction Assessment: Patient is on plavix and aspirin 325 mg following AAA repair; will monitor for any signs of bleeding.    Lab Monitoring Plan     C1D1+   CBC, CMP C2D1+ Call, CBC, CMP C3D1+ Call, CBC, CMP C4D1+ Call, CBC, CMP C5D1+ Call, CBC, CMP C6D1+ Call, CBC, CMP   C1D8+  C2D8+  C3D8+  C4D8+  C5D8+  C6D8+    C1D15+ Call C2D15+  C3D15+  C4D15+  C5D15+  C6D15+    C1D22+  C2D22+  C3D22+  C4D22+  C5D22+  C6D22+    CBC monthly  CMP monthly     Subjective/Objective:  Austin Garza is a 79 year old male called on the phone for an initial visit for oral chemotherapy education.  He takes plavix, and aspirin as recommended by vascular following a AAA repair.  He also has loose stools (1-2 per day) that he attributes to his lipitor.  He was switched to lipitor and noticed it but had a whole month of simvastatin left over.  He went back on the simvastatin and his loose stools went away.  He will be talking to his primary about switching back.    ORAL CHEMOTHERAPY 4/28/2020   Drug Name Imbruvica (Ibrutinib)   Current Dosage 420mg   Current Schedule Daily   Cycle Details Continuous   Any new drug interactions? Yes   Pharmacist Intervention? Yes   Intervention(s) Patient Education   Is the dose as ordered appropriate for the patient? Yes   Is the patient currently in pain? No   Has the patient been assessed within the past 6 months for depression? Yes   Has the patient missed any days of school, work, or other routine activity? No       Last PHQ-2 Score on record:   PHQ-2 ( [facility]) 10/10/2018 12/11/2017   Q1: Little interest or pleasure in doing things 0 0   Q2: Feeling  "down, depressed or hopeless 0 0   PHQ-2 Score 0 0       Patient does not report depression symptoms.      Vitals:  BP:   BP Readings from Last 1 Encounters:   01/23/20 128/64     Wt Readings from Last 1 Encounters:   01/23/20 94.3 kg (208 lb)     Estimated body surface area is 2.14 meters squared as calculated from the following:    Height as of 1/23/20: 1.753 m (5' 9\").    Weight as of 1/23/20: 94.3 kg (208 lb).      Labs:  _  Result Component Current Result Ref Range   Sodium 136 (4/23/2020) 133 - 144 mmol/L     _  Result Component Current Result Ref Range   Potassium 4.4 (4/23/2020) 3.4 - 5.3 mmol/L     _  Result Component Current Result Ref Range   Calcium 9.3 (4/23/2020) 8.5 - 10.1 mg/dL     No results found for Mag within last 30 days.     No results found for Phos within last 30 days.     _  Result Component Current Result Ref Range   Albumin 4.3 (4/23/2020) 3.4 - 5.0 g/dL     _  Result Component Current Result Ref Range   Urea Nitrogen 39 (H) (4/23/2020) 7 - 30 mg/dL     _  Result Component Current Result Ref Range   Creatinine 1.75 (H) (4/23/2020) 0.66 - 1.25 mg/dL       _  Result Component Current Result Ref Range   AST 17 (4/23/2020) 0 - 45 U/L     _  Result Component Current Result Ref Range   ALT 23 (4/23/2020) 0 - 70 U/L     _  Result Component Current Result Ref Range   Bilirubin Total 0.5 (4/23/2020) 0.2 - 1.3 mg/dL       _  Result Component Current Result Ref Range   WBC 97.6 (HH) (4/23/2020) 4.0 - 11.0 10e9/L     _  Result Component Current Result Ref Range   Hemoglobin 8.0 (L) (4/23/2020) 13.3 - 17.7 g/dL     _  Result Component Current Result Ref Range   Platelet Count 83 (L) (4/23/2020) 150 - 450 10e9/L     _  Result Component Current Result Ref Range   Absolute Neutrophil 4.9 (4/23/2020) 1.6 - 8.3 10e9/L           Assessment:  Patient is appropriate to start therapy.    Plan:  Basic chemotherapy teaching was reviewed with the patient including indication, start date of therapy, dose, " administration, adverse effects, missed doses, food and drug interactions, monitoring, side effect management, office contact information, and safe handling.  He was told to avoid grapefruit and it juice.  He was also told to report any signs of bleeding including blood in the stool, urine, from his gums and/or easy bruising.  Written materials were provided and all questions answered.     Follow-Up:  5/1/20     Marty Meese, Pharm.D., BCOP

## 2020-04-29 ENCOUNTER — TELEPHONE (OUTPATIENT)
Dept: ONCOLOGY | Facility: CLINIC | Age: 80
End: 2020-04-29

## 2020-04-29 RX ORDER — PROCHLORPERAZINE MALEATE 10 MG
10 TABLET ORAL EVERY 6 HOURS PRN
Qty: 30 TABLET | Refills: 2 | Status: SHIPPED | OUTPATIENT
Start: 2020-04-29 | End: 2020-06-03

## 2020-04-29 NOTE — TELEPHONE ENCOUNTER
PA Initiation    Medication: Imbruvica- PENDING  Insurance Company: [employer] [place] - Phone [phone] Fax [phone]  Pharmacy Filling the Rx: [facility] - [place], [place] - [address] [place] [address]  Filling Pharmacy Phone:    Filling Pharmacy Fax:    Start Date: 4/29/2020

## 2020-04-29 NOTE — PATIENT INSTRUCTIONS
Labs scheduled 5/12  Appt with Dr. Perry 5/14  Tosha Burris, RN, BSN, [facility]  Patient Care Coordinator  [facility]  [phone]

## 2020-04-30 NOTE — TELEPHONE ENCOUNTER
Prior Authorization Approval    Authorization Effective Date: 4/29/2020  Authorization Expiration Date: 4/29/2021  Medication: Imbruvica- Approved ([employer])   Approved Dose/Quantity: 420mg  Reference #: [id number]   Insurance Company: [employer] [place] - Phone [phone] Fax [phone]  Expected CoPay: $2819.81     CoPay Card Available: No    Foundation Assistance Needed: [employer], [employer], [employer] and PAN all open   Which Pharmacy is filling the prescription (Not needed for infusion/clinic administered): [facility] - [place], [place] - [address] [facility] [facility] [address] [facility]  Pharmacy Notified: Yes  Patient Notified: Yes    Have not received the letter yet.  Will document when received

## 2020-05-08 ENCOUNTER — TELEPHONE (OUTPATIENT)
Dept: ONCOLOGY | Facility: CLINIC | Age: 80
End: 2020-05-08

## 2020-05-08 NOTE — ORAL ONC MGMT
Oral Chemotherapy Monitoring Program  Primary Oncologist: Dr. Perry  Primary Oncology Clinic: [facility]  Cancer Diagnosis: CLL     Drug: ibrutinib 420 mg PO daily  Start Date: 5/04/2020  Dose is appropriate for patients: renal/ Hepatic Function        Expected duration of therapy: Until disease progression or unacceptable toxicity      Lab Monitoring Plan      C1D1+   CBC, CMP C2D1+ Call, CBC, CMP C3D1+ Call, CBC, CMP C4D1+ Call, CBC, CMP C5D1+ Call, CBC, CMP C6D1+ Call, CBC, CMP   C1D8+   C2D8+   C3D8+   C4D8+   C5D8+   C6D8+     C1D15+ Call C2D15+   C3D15+   C4D15+   C5D15+   C6D15+     C1D22+   C2D22+   C3D22+   C4D22+   C5D22+   C6D22+     CBC monthly  CMP monthly     Subjective/Objective:  Austin Garza is a 79 year old male contacted by phone for a follow-up visit for oral chemotherapy.      ORAL CHEMOTHERAPY 4/28/2020 5/8/2020   Drug Name Imbruvica (Ibrutinib) Imbruvica (Ibrutinib)   Current Dosage 420mg 420mg   Current Schedule Daily Daily   Cycle Details Continuous Continuous   Start Date of Last Cycle - 5/4/2020   Planned next cycle start date - 6/1/2020   Doses missed in last 2 weeks - 0   Adherence Assessment - Adherent   Adverse Effects - Diarrhea   Diarrhea - Grade 1   Pharmacist Intervention(diarrhea) - Yes   Intervention(s) - Patient education   Any new drug interactions? Yes No   Pharmacist Intervention? Yes -   Intervention(s) Patient Education -   Is the dose as ordered appropriate for the patient? Yes Yes   Is the patient currently in pain? No No   Has the patient been assessed within the past 6 months for depression? Yes Yes   Has the patient missed any days of school, work, or other routine activity? No No       Last PHQ-2 Score on record:   PHQ-2 ( [facility]) 10/10/2018 12/11/2017   Q1: Little interest or pleasure in doing things 0 0   Q2: Feeling down, depressed or hopeless 0 0   PHQ-2 Score 0 0       Patient does not report depression symptoms.      Vitals:  BP:   BP Readings from Last 1  "Encounters:   01/23/20 128/64     Wt Readings from Last 1 Encounters:   01/23/20 94.3 kg (208 lb)     Estimated body surface area is 2.14 meters squared as calculated from the following:    Height as of 1/23/20: 1.753 m (5' 9\").    Weight as of 1/23/20: 94.3 kg (208 lb).    Labs:  _  Result Component Current Result Ref Range   Sodium 136 (4/23/2020) 133 - 144 mmol/L     _  Result Component Current Result Ref Range   Potassium 4.4 (4/23/2020) 3.4 - 5.3 mmol/L     _  Result Component Current Result Ref Range   Calcium 9.3 (4/23/2020) 8.5 - 10.1 mg/dL     No results found for Mag within last 30 days.     No results found for Phos within last 30 days.     _  Result Component Current Result Ref Range   Albumin 4.3 (4/23/2020) 3.4 - 5.0 g/dL     _  Result Component Current Result Ref Range   Urea Nitrogen 39 (H) (4/23/2020) 7 - 30 mg/dL     _  Result Component Current Result Ref Range   Creatinine 1.75 (H) (4/23/2020) 0.66 - 1.25 mg/dL       _  Result Component Current Result Ref Range   AST 17 (4/23/2020) 0 - 45 U/L     _  Result Component Current Result Ref Range   ALT 23 (4/23/2020) 0 - 70 U/L     _  Result Component Current Result Ref Range   Bilirubin Total 0.5 (4/23/2020) 0.2 - 1.3 mg/dL       _  Result Component Current Result Ref Range   WBC 97.6 (HH) (4/23/2020) 4.0 - 11.0 10e9/L     _  Result Component Current Result Ref Range   Hemoglobin 8.0 (L) (4/23/2020) 13.3 - 17.7 g/dL     _  Result Component Current Result Ref Range   Platelet Count 83 (L) (4/23/2020) 150 - 450 10e9/L     _  Result Component Current Result Ref Range   Absolute Neutrophil 4.9 (4/23/2020) 1.6 - 8.3 10e9/L       Assessment:  I spoke with Benji for first time check on tolerability of Imbruvica. He started on 5/04 and has taken daily as directed. He has some heart burn at night and has been taking Tums 2 before bed and then one in the middle of th night. This is increased from before starting therapy. I recommended he try famotidine instead " to see if that helps provide more relief. He will try that. He has also started to have loose stools. He has had this previously and thought it was attributable to the atorvastatin he was taking. When he started taking simvastatin instead, the loose stools resolved. Then they started again and he started taking Imbruvica so they have continued. He is not bothered by them (about 2 per day now), only that the urge to go can come on suddenly and he has to get to the bathroom quickly to prevent an accident. He is continuing to have issues sleeping- he is able to fall asleep but every night wakes up after midnight and has trouble falling back asleep. He has been using diphenhydramine 25mg at night to help and that seems to work. He usually reads a book for about an hour and then is able to fall back asleep and stays asleep until about 8 in the morning. I recommended he try to exercise- walking or something active to help with his sleep. WIth the COVID pandemic, he is unable to go to the [facility] so he is struggling to find ways to stay active. We discussed walking even 5 minutes three times daily to get a bit of activity. He is doing chores around the home and it sounds like there is some bruising and sometimes bleeding from scratches he gets doing his chores. Sometimes they take a bit longer to stop bleeding. I explained that the Imbruvica can increase the bleeding potential with his Plavix. He understands what to look for and I asked that he be more careful in his activities due to this increased risk. Benji has labs scheduled for 5/12 and follow up visit with Dr Perry on 5/14. He appreciated the call and all questions were answered.      Plan:  Continue as tolerated    Follow-Up:  5/12 with labs    Refill Due:  06/01/2020    Hung Martinez [facility] May 8, 2020   [facility]  [phone]

## 2020-05-12 ENCOUNTER — HOSPITAL ENCOUNTER (OUTPATIENT)
Facility: CLINIC | Age: 80
Setting detail: SPECIMEN
Discharge: HOME | End: 2020-05-12
Attending: INTERNAL MEDICINE | Admitting: INTERNAL MEDICINE
Payer: COMMERCIAL

## 2020-05-12 ENCOUNTER — DOCUMENTATION ONLY (OUTPATIENT)
Dept: INFUSION THERAPY | Facility: CLINIC | Age: 80
End: 2020-05-12

## 2020-05-12 ENCOUNTER — PATIENT OUTREACH (OUTPATIENT)
Dept: ONCOLOGY | Facility: CLINIC | Age: 80
End: 2020-05-12

## 2020-05-12 DIAGNOSIS — C91.10 CLL (CHRONIC LYMPHOCYTIC LEUKEMIA) (H): ICD-10-CM

## 2020-05-12 LAB
ALBUMIN SERPL-MCNC: 4.1 G/DL (ref 3.4–5)
ALP SERPL-CCNC: 46 U/L (ref 40–150)
ALT SERPL W P-5'-P-CCNC: 20 U/L (ref 0–70)
ANION GAP SERPL CALCULATED.3IONS-SCNC: 4 MMOL/L (ref 3–14)
ANISOCYTOSIS BLD QL SMEAR: SLIGHT
AST SERPL W P-5'-P-CCNC: 14 U/L (ref 0–45)
BASOPHILS # BLD AUTO: 0 10E9/L (ref 0–0.2)
BASOPHILS NFR BLD AUTO: 0 %
BILIRUB SERPL-MCNC: 0.6 MG/DL (ref 0.2–1.3)
BUN SERPL-MCNC: 39 MG/DL (ref 7–30)
CALCIUM SERPL-MCNC: 8.9 MG/DL (ref 8.5–10.1)
CHLORIDE SERPL-SCNC: 106 MMOL/L (ref 94–109)
CO2 SERPL-SCNC: 27 MMOL/L (ref 20–32)
CREAT SERPL-MCNC: 1.65 MG/DL (ref 0.66–1.25)
DACRYOCYTES BLD QL SMEAR: SLIGHT
DIFFERENTIAL METHOD BLD: ABNORMAL
EOSINOPHIL # BLD AUTO: 0 10E9/L (ref 0–0.7)
EOSINOPHIL NFR BLD AUTO: 0 %
ERYTHROCYTE [DISTWIDTH] IN BLOOD BY AUTOMATED COUNT: 18.8 % (ref 10–15)
GFR SERPL CREATININE-BSD FRML MDRD: 39 ML/MIN/{1.73_M2}
GLUCOSE SERPL-MCNC: 168 MG/DL (ref 70–99)
HCT VFR BLD AUTO: 27.8 % (ref 40–53)
HGB BLD-MCNC: 8 G/DL (ref 13.3–17.7)
LDH SERPL L TO P-CCNC: 172 U/L (ref 85–227)
LYMPHOCYTES # BLD AUTO: 107.8 10E9/L (ref 0.8–5.3)
LYMPHOCYTES NFR BLD AUTO: 96 %
MACROCYTES BLD QL SMEAR: PRESENT
MCH RBC QN AUTO: 33.8 PG (ref 26.5–33)
MCHC RBC AUTO-ENTMCNC: 28.8 G/DL (ref 31.5–36.5)
MCV RBC AUTO: 117 FL (ref 78–100)
MONOCYTES # BLD AUTO: 2.2 10E9/L (ref 0–1.3)
MONOCYTES NFR BLD AUTO: 2 %
NEUTROPHILS # BLD AUTO: 2.2 10E9/L (ref 1.6–8.3)
NEUTROPHILS NFR BLD AUTO: 2 %
OVALOCYTES BLD QL SMEAR: SLIGHT
PLATELET # BLD AUTO: 80 10E9/L (ref 150–450)
PLATELET # BLD EST: ABNORMAL 10*3/UL
POTASSIUM SERPL-SCNC: 4.6 MMOL/L (ref 3.4–5.3)
PROT SERPL-MCNC: 7.4 G/DL (ref 6.8–8.8)
RBC # BLD AUTO: 2.37 10E12/L (ref 4.4–5.9)
SODIUM SERPL-SCNC: 137 MMOL/L (ref 133–144)
WBC # BLD AUTO: 112.3 10E9/L (ref 4–11)

## 2020-05-12 PROCEDURE — 80053 COMPREHEN METABOLIC PANEL: CPT | Performed by: INTERNAL MEDICINE

## 2020-05-12 PROCEDURE — 85025 COMPLETE CBC W/AUTO DIFF WBC: CPT | Performed by: INTERNAL MEDICINE

## 2020-05-12 PROCEDURE — 83615 LACTATE (LD) (LDH) ENZYME: CPT | Performed by: INTERNAL MEDICINE

## 2020-05-12 PROCEDURE — 36415 COLL VENOUS BLD VENIPUNCTURE: CPT

## 2020-05-12 NOTE — PROGRESS NOTES
Oral Chemotherapy Monitoring Program  Lab Follow Up    Patient currently on Imbruiva therapy for CLL.    Reviewed lab results from 5/12.    Results show no concerning abnormalities.    Assessment & Plan:  Continue current therapy.   Questions answered to patient's satisfaction.    Follow-Up:  Seeing Dr. Perry on 5/14. Pharmacy follow-up in 3 weeks.     Sabine Cheney [facility]

## 2020-05-12 NOTE — PROGRESS NOTES
Noted.  WBC elevated, consistent with his CLL diagnosis and his previous checks.  He just started ibrutinib on 5/4/20.  I have visit with him on 5/14/20 to discuss.

## 2020-05-12 NOTE — PROGRESS NOTES
Critical value from today received for:      .3      Writer will notify the provider.    Tosha Burris RN, BSN, [facility]  Patient Care Coordinator  [facility]  [phone]

## 2020-05-14 ENCOUNTER — VIRTUAL VISIT (OUTPATIENT)
Dept: ONCOLOGY | Facility: CLINIC | Age: 80
End: 2020-05-14
Attending: INTERNAL MEDICINE
Payer: COMMERCIAL

## 2020-05-14 DIAGNOSIS — C91.10 CLL (CHRONIC LYMPHOCYTIC LEUKEMIA) (H): Primary | ICD-10-CM

## 2020-05-14 PROCEDURE — 99214 OFFICE O/P EST MOD 30 MIN: CPT | Mod: 95 | Performed by: INTERNAL MEDICINE

## 2020-05-14 NOTE — PROGRESS NOTES
"Austin Garza is a 79 year old male who is being evaluated via a billable video visit.      The patient has been notified of following:     \"This video visit will be conducted via a call between you and your physician/provider. We have found that certain health care needs can be provided without the need for an in-person physical exam.  This service lets us provide the care you need with a video conversation.  If a prescription is necessary we can send it directly to your pharmacy.  If lab work is needed we can place an order for that and you can then stop by our lab to have the test done at a later time.    Video visits are billed at different rates depending on your insurance coverage.  Please reach out to your insurance provider with any questions.    If during the course of the call the physician/provider feels a video visit is not appropriate, you will not be charged for this service.\"    Patient has given verbal consent for Video visit? Yes    How would you like to obtain your AVS? Isidoro    Patient would like the video invitation sent by: Text to cell phone: [phone]    Will anyone else be joining your video visit? No    Symptoms since starting new Medication:  -c/o diarrhea that started first day that he took new med. Very urgent/uncontrollable. Woke him up in the middle of the night.   - also noticed dizziness upon standing fast  - bruising easily still    Geetha Benítez CMA      Video-Visit Details    Type of service:  Video Visit    Video Start Time: 2:45 pm  Video End Time: 3:00 pm    Originating Location (pt. Location): Home    Distant Location (provider location):  [facility]     Platform used for Video Visit: [facility]    Hematology/Oncology Established Patient Note      Today's Date: 5/14/20    Reason for Follow-up: CLL      HISTORY OF PRESENT ILLNESS: Austin Garza is a 79 year old male with PMHx of DMII, HTN who presented with " leukocytosis.  In November 2017, he was found to have elevated WBC of 61.7K, hemoglobin of 10.4, and platelet of 115K.  There were no other CBC results available between 2010 and 2017.  Prior to 2010, he had normal CBC, with normal to mild anemia, and mild thrombocytopenia.   He was asymptomatic.    He underwent bone marrow biopsy on 11/21/17, which was consistent with chronic lymphocytic leukemia/small lymphocytic lymphoma, with 13% ringed sideroblasts identified.  The presence of increased numbers of ringed sideroblasts raises the possibility of an associated myelodysplastic syndrome.  Dysplastic changes are not identified though.  Many cases exhibiting ringed sideroblasts are metabolic origin, without significant risk of progression to acute leukemia, and these typically do not show dysplastic morphology as is the case with this specimen.  15% ringed sideroblasts are required for a diagnosis for RARS, which this specimen does not meet.  Flow cytometry is also consistent with CLL/SLL.  Cytogenetics show two (10%) of the metaphase cells comprise a clone characterized by a deletion within the proximal long arm of a chromosome 13 as the sole karyotypic abnormality.  Three (15%) metaphases had loss of the Y chromosome as the sole abnormality.    CT scan on 11/29/17 shows mildly enlarged left axillary lymph node and periaortic lymph nodes in the retroperitoneum of the abdomen.  Spleen is also enlarged.    On his staging CT scan for CLL, he was incidentally found to have a large infrarenal abdominal aortic aneurysm, measuring 7.6 cm.  He was seen by Dr. Thomas, and he underwent EVAR on 12/4/17.     He started ibrutinib on 5/4/20.      INTERIM HISTORY: Austin says that he has been having diarrhea.  The first day he took ibrutinib, he had diarrhea x 4.  The next day, he had diarrhea x 3.  The following day, he had diarrhea x 2, and then 1 time a day after that.  However, last night, he had urgency and had diarrhea in his  bed.  He wonders if it is due to lactose intake.  He also thinks his other medications may be contributing too, as he had diarrhea problems prior to starting ibrutinib, but it did get worse afterwards.        REVIEW OF SYSTEMS:   14 point ROS was reviewed and is negative other than as noted above in HPI.       HOME MEDICATIONS:  Current Outpatient Medications   Medication Sig Dispense Refill     aspirin (ASA) 81 MG tablet Take 1 tablet (81 mg) by mouth every evening (Patient taking differently: Take 325 mg by mouth every evening ) 90 tablet 3     atorvastatin (LIPITOR) 20 MG tablet Take 1 tablet (20 mg) by mouth At Bedtime 90 tablet 3     clopidogrel (PLAVIX) 75 MG tablet Take 1 tablet (75 mg) by mouth daily NEEDS VASCULAR FOLLOW UP IN MAY, 2020 90 tablet 1     glimepiride (AMARYL) 1 MG tablet Take 1 tablet (1 mg) by mouth every morning (before breakfast) 90 tablet 3     ibrutinib (IMBRUVICA) 420 MG tablet Take 1 tablet (420 mg) by mouth daily 28 tablet 0     losartan-hydrochlorothiazide (HYZAAR) 100-25 MG tablet Take 1 tablet by mouth daily 90 tablet 3     metFORMIN (GLUCOPHAGE) 1000 MG tablet TAKE 1 TABLET BY MOUTH TWICE A DAY WITH FOOD 180 tablet 3     Multiple Vitamins-Minerals (CENTRUM SILVER) per tablet Take 1 tablet by mouth daily       prochlorperazine (COMPAZINE) 10 MG tablet Take 1 tablet (10 mg) by mouth every 6 hours as needed (Nausea/Vomiting) 30 tablet 2         ALLERGIES:  Allergies   Allergen Reactions     Ace Inhibitors      cough         PAST MEDICAL HISTORY:  Past Medical History:   Diagnosis Date     AAA (abdominal aortic aneurysm)      BCC (basal cell carcinoma of skin) - face      CLL (chronic lymphocytic leukemia)      Diaphragmatic hernia      Diverticulitis of colon      Esophageal reflux      Essential hypertension      Hyperlipidemia      Irritable bowel syndrome      Macular degeneration (senile) of retina      Squamous Cell Carcinoma, Back 1988     Type 2 diabetes mellitus          PAST  SURGICAL HISTORY:  Past Surgical History:   Procedure Laterality Date     APPENDECTOMY OPEN  1966     BONE MARROW BIOPSY, BONE SPECIMEN, NEEDLE/TROCAR N/A 2017    Procedure: BIOPSY BONE MARROW;  BONE MARROW BIOPSY;  Surgeon: Shady Garcia MD;  Location:  GI     COLONOSCOPY       ENDOVASCULAR REPAIR ANEURYSM ABDOMINAL AORTA N/A 2017    Procedure: ENDOVASCULAR REPAIR ANEURYSM ABDOMINAL AORTA;  ENDOVASCULAR REPAIR ANEURYSM ABDOMINAL AORTA;  Surgeon: Milton Cazares MD;  Location:  OR     Fistulotomy with marsupialization for repair of fisture in ano       IR ABDOMINAL AORTOGRAM  3/11/2019     IR VISCERAL EMBOLIZATION  2019     Multiple skin tags - resection       Squamous cell skin cancer resection - back           SOCIAL HISTORY:  Social History     Socioeconomic History     Marital status:      Spouse name: librado     Number of children: 0     Years of education: 17     Highest education level: Not on file   Occupational History     Occupation: retired   Social Needs     Financial resource strain: Not on file     Food insecurity     Worry: Not on file     Inability: Not on file     Transportation needs     Medical: Not on file     Non-medical: Not on file   Tobacco Use     Smoking status: Former Smoker     Packs/day: 0.50     Years: 20.00     Pack years: 10.00     Last attempt to quit: 1975     Years since quittin.3     Smokeless tobacco: Former User   Substance and Sexual Activity     Alcohol use: Yes     Alcohol/week: 10.0 - 14.0 standard drinks     Types: 10 - 14 Standard drinks or equivalent per week     Comment: 2 drinks a day/wine     Drug use: No     Sexual activity: Never   Lifestyle     Physical activity     Days per week: Not on file     Minutes per session: Not on file     Stress: Not on file   Relationships     Social connections     Talks on phone: Not on file     Gets together: Not on file     Attends Christianity service: Not on file      Active member of club or organization: Not on file     Attends meetings of clubs or organizations: Not on file     Relationship status: Not on file     Intimate partner violence     Fear of current or ex partner: Not on file     Emotionally abused: Not on file     Physically abused: Not on file     Forced sexual activity: Not on file   Other Topics Concern     Parent/sibling w/ CABG, MI or angioplasty before 65F 55M? Not Asked   Social History Narrative     Not on file   He quit smoking in .  He drinks 1-3 glasses of wine a night with dinner.  He is retired, and used to work as a .  He lives with his wife in [place].  His cousin had lung cancer and  around age 69.  Otherwise, he denies family history of cancer.        FAMILY HISTORY:  Family History   Problem Relation Age of Onset     Cerebrovascular Disease Father         x 2     Hypertension Mother      C.A.D. Mother      Cancer Mother         skin     Eye Disorder Mother         glaucoma     Prostate Cancer No family hx of      Cancer - colorectal No family hx of      Glaucoma No family hx of      Macular Degeneration No family hx of          PHYSICAL EXAM:  Vital signs:  There were no vitals taken for this visit.   ECO  GENERAL/CONSTITUTIONAL: No acute distress. Healthy, alert.  EYES: No scleral icterus.  No redness or discharge.    RESPIRATORY: No audible wheeze, cough, or visible cyanosis.  No visible retractions or increased work of breathing.  Able to speak fully in complete sentences.  MUSCULOSKELETAL: Normal range of motion.  NEUROLOGIC: Alert, oriented, answers questions appropriately. No tremor. Mentation intact and speech normal  INTEGUMENTARY: No jaundice.  No obvious rash or skin lesions.  PSYCHIATRIC:  Mentation appears normal, affect normal/bright, judgement and insight intact, normal speech and appearance well-groomed.    The rest of a comprehensive physical exam is deferred due to public health emergency video visit  restrictions.        LABS:  CBC RESULTS:   Recent Labs   Lab Test 05/12/20  0931   .3*   RBC 2.37*   HGB 8.0*   HCT 27.8*   *   MCH 33.8*   MCHC 28.8*   RDW 18.8*   PLT 80*           ASSESSMENT/PLAN:  Austin Garza is a 79 year old male with:    1) CLL/SLL: BLACKWOOD stage III, with lymphocytosis, lymphadenopathy, splenomegaly, and anemia.  He has mild thrombocytopenia as well, but is above 100K.  He presented with leukocytosis with WBC of 61.7K, hemoglobin of 10.4, and platelet count of 115K on diagnosis.  Bone marrow biopsy and flow cytometry confirmed CLL/SLL.  CT scan shows mildly enlarged left axillary lymph node and periaortic lymph nodes in the retroperitoneum of the abdomen, with enlarged spleen.      Due to worsening lymphocyte count, anemia, thrombocytopenia, as well as fatigue and night sweats, he started ibrutinib on 5/4/20.  His labs are not changed much yet, but he did just start the medication 10 days ago.      -continue ibrutinib.  We discussed potential side effects, including bleeding risk.  He is not on warfarin and has no history of arrhythmias.  He is on aspirin and Plavix though.  I discussed with pharmacy.  There is no contraindication, but we will monitor closely for bleeding.    -pharmacy following  -continue same dose for now, depending on if his diarrhea worsens/improves - see below  -repeat labs in 3 weeks with visit    2) Diarrhea: The frequency is improved, but he had urgency that is very disturbing for him.  He will see if this becomes a consistent issue.  He is also going to try Imodium.  If no improvement or worsens, we may need to dose reduce the ibrutinib.      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 He completed radiation at [facility] with planned 60 Gy x 30 fractions. He has completed radiation and noted that he did not need any more follow-up for this.    4) Abdominal aortic  aneurysm: measures up to 7.6 cm on CT scan, incidentally found.  He underwent EVAR on 12/4/17.  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  He is on Plavix now.  On 5/13/19, he underwent and percutaneous aneurysm sac puncture and endo leak embolization by IR on 5/13/19.  -follow-up with vascular surgery and IR    5) Diabetes, hypertension:  -following with PCP      Breana Perry MD  Hematology/Oncology  [facility]

## 2020-05-21 ENCOUNTER — TELEPHONE (OUTPATIENT)
Dept: OTHER | Facility: CLINIC | Age: 80
End: 2020-05-21

## 2020-05-21 ENCOUNTER — TELEPHONE (OUTPATIENT)
Dept: INFUSION THERAPY | Facility: CLINIC | Age: 80
End: 2020-05-21

## 2020-05-21 NOTE — ORAL ONC MGMT
Oral Chemotherapy Monitoring Program  Primary Oncologist: Dr. Perry  Primary Oncology Clinic: [facility] Diagnosis: CLL     Drug: ibrutinib 420 mg PO daily  Start Date: 5/04/2020  Dose is appropriate for patients: renal/ Hepatic Function        Expected duration of therapy: Until disease progression or unacceptable toxicity    Lab Monitoring Plan                    C1D1+   CBC, CMP C2D1+ Call, CBC, CMP C3D1+ Call, CBC, CMP C4D1+ Call, CBC, CMP C5D1+ Call, CBC, CMP C6D1+ Call, CBC, CMP   C1D8+   C2D8+   C3D8+   C4D8+   C5D8+   C6D8+     C1D15+ Call C2D15+   C3D15+   C4D15+   C5D15+   C6D15+     C1D22+   C2D22+   C3D22+   C4D22+   C5D22+   C6D22+     CBC monthly  CMP monthly      Subjective/Objective:  Austin Garza is a 79 year old male contacted by phone for a follow-up visit for oral chemotherapy.  Patient has been experiencing diarrhea since starting the Imbruvia, although had some troubles with diarrhea before which he thought may be due to Lipitor. He is currently on Lipitor as well. Patient has been taking Imodium daily for every 2 out of 3 days. This results in a BM every other day, however on the day he has a bowel movement it is still diarrhea. This is more manageable compared to before because the urgency is gone. He does report a bit of discomfort in his legs. There is some relief when elevating his legs. Patient is having a tooth extraction next week. He will be reaching out to Dr. Perry for advice on whether to continue or hold the Imbruvica beforehand.     ORAL CHEMOTHERAPY 4/28/2020 5/8/2020 5/21/2020   Drug Name Imbruvica (Ibrutinib) Imbruvica (Ibrutinib) Imbruvica (Ibrutinib)   Current Dosage 420mg 420mg 420mg   Current Schedule Daily Daily Daily   Cycle Details Continuous Continuous Continuous   Start Date of Last Cycle - 5/4/2020 -   Planned next cycle start date - 6/1/2020 -   Doses missed in last 2 weeks - 0 0   Adherence Assessment - Adherent Adherent   Adverse Effects - Diarrhea Diarrhea  "  Diarrhea - Grade 1 Grade 1   Pharmacist Intervention(diarrhea) - Yes Yes   Intervention(s) - Patient education Patient education   Any new drug interactions? Yes No No   Pharmacist Intervention? Yes - -   Intervention(s) Patient Education - -   Is the dose as ordered appropriate for the patient? Yes Yes Yes   Is the patient currently in pain? No No No   Has the patient been assessed within the past 6 months for depression? Yes Yes No   Has the patient missed any days of school, work, or other routine activity? No No No       Last PHQ-2 Score on record:   PHQ-2 ( 1999 [employer]) 10/10/2018 12/11/2017   Q1: Little interest or pleasure in doing things 0 0   Q2: Feeling down, depressed or hopeless 0 0   PHQ-2 Score 0 0       Patient does not report depression symptoms.      Vitals:  BP:   BP Readings from Last 1 Encounters:   01/23/20 128/64     Wt Readings from Last 1 Encounters:   01/23/20 94.3 kg (208 lb)     Estimated body surface area is 2.14 meters squared as calculated from the following:    Height as of 1/23/20: 1.753 m (5' 9\").    Weight as of 1/23/20: 94.3 kg (208 lb).    Labs:  _  Result Component Current Result Ref Range   Sodium 137 (5/12/2020) 133 - 144 mmol/L     _  Result Component Current Result Ref Range   Potassium 4.6 (5/12/2020) 3.4 - 5.3 mmol/L     _  Result Component Current Result Ref Range   Calcium 8.9 (5/12/2020) 8.5 - 10.1 mg/dL     No results found for Mag within last 30 days.     No results found for Phos within last 30 days.     _  Result Component Current Result Ref Range   Albumin 4.1 (5/12/2020) 3.4 - 5.0 g/dL     _  Result Component Current Result Ref Range   Urea Nitrogen 39 (H) (5/12/2020) 7 - 30 mg/dL     _  Result Component Current Result Ref Range   Creatinine 1.65 (H) (5/12/2020) 0.66 - 1.25 mg/dL       _  Result Component Current Result Ref Range   AST 14 (5/12/2020) 0 - 45 U/L     _  Result Component Current Result Ref Range   ALT 20 (5/12/2020) 0 - 70 U/L     _  Result " Component Current Result Ref Range   Bilirubin Total 0.6 (5/12/2020) 0.2 - 1.3 mg/dL       _  Result Component Current Result Ref Range   .3 (HH) (5/12/2020) 4.0 - 11.0 10e9/L     _  Result Component Current Result Ref Range   Hemoglobin 8.0 (L) (5/12/2020) 13.3 - 17.7 g/dL     _  Result Component Current Result Ref Range   Platelet Count 80 (L) (5/12/2020) 150 - 450 10e9/L     _  Result Component Current Result Ref Range   Absolute Neutrophil 2.2 (5/12/2020) 1.6 - 8.3 10e9/L       Assessment:  Patient is tolerating the Imbruvica with improvement in overall adverse events since starting imodium.     Plan:  Imbruvica prior to tooth extraction per Dr. Perry recommendations (hold vs continue).  Plan to continue prn imodium and monitor diarrhea for improvement. Continue Imbruvica as tolerated.     Follow-Up:  2 weeks (scheduled for 6/3-6/4 labs and video visit)     Refill Due:  6/1    Sabine Cheney, PharmD

## 2020-05-21 NOTE — TELEPHONE ENCOUNTER
"Pt hx of EVAR for 7.6 cm infra-renal AAA in patient with CLL on 12/4/17 with Dr. Cazares.    Pt sent Cloud[employer] message:  I have a tooth extraction scheduled for May 28.  I have been asked to get your advice regarding the continuance or discontinuance of my clopidogrel medication.\"    LOV 3/22/19 with Dr. Cazares:  \"Plan:  I plan to set up Austin for an abdominal aortogram with assessment of the location of the type II endoleak and possible embolization.  The transfemoral approach was unsuccessful and a small SMA dissection was noted.  He is following up with IR April 18th with CTA and then discussion about possible direct sac puncture to treat his type II endoleak from the YOHAN.  He continues to be asymptomatic and I've asked him to call if he develops significant discomfort in his low back/abdomen.  If the endoleak is treated I will see him back in 6 months with a repeat CTA of the abdomen/pelvis.\"     11/12/19 IR SHAMIR Knox noted:  \"Dr. Gauthier reviewed CTA abdomen and pelvis.  Recommend 6 mo f/u.  No endoleak detected, aneurysm sac is smaller.\"    JAMESON OropezaN, RN  [facility]        "

## 2020-05-21 NOTE — TELEPHONE ENCOUNTER
Routing to Dr. Wheeler to please advise on if pt okay to hold Plavix for tooth extraction on 5/28/20. And virtual or inperson OV with you for follow up.     (see previous tele enc below for further info).    I will also inform pt he needs pre procedure antibiotic prophylactics. Will place order Amoxicillin per protocol.     Pt also to f/u for repeat CTA abd/pelvis and   Virutal/inperson visit with Dr. Wheeler.     Cheryl Mitchell, BSN, RN  [facility] [place]

## 2020-05-26 DIAGNOSIS — I77.70 ARTERY DISSECTION (H): ICD-10-CM

## 2020-05-27 DIAGNOSIS — C91.10 CLL (CHRONIC LYMPHOCYTIC LEUKEMIA) (H): Primary | ICD-10-CM

## 2020-05-27 RX ORDER — CLOPIDOGREL BISULFATE 75 MG/1
TABLET ORAL
Qty: 90 TABLET | Refills: 1 | Status: SHIPPED | OUTPATIENT
Start: 2020-05-27 | End: 2020-12-18

## 2020-05-27 NOTE — TELEPHONE ENCOUNTER
Routing refill request to provider for review/approval because:  Labs out of range:  Hgb, Plt  Lorna Herbert RN, BSN

## 2020-05-28 NOTE — TELEPHONE ENCOUNTER
Pt called back, had consult for tooth extraction, has yet to have procedure.     Pt verified he has Amoxiciilin 500mg tabs on hand, instructed pt to take 4 tabs one hour prior to procedure. Pt notes understanding. Pt will call at later date to schedule follow up and imaging.     JAMESON OropezaN, RN  [facility] [employer]

## 2020-05-28 NOTE — TELEPHONE ENCOUNTER
Late charting.    [employer] message sent on 5/26/20 to pt with this update.     I called pt today, left vm to ensure he read the [employer] message, reiterated need for pre procedure antibiotics and follow up needing to be scheduled.     Cheryl Mitchell, JAMESONN, RN  [facility] [place]

## 2020-06-01 ENCOUNTER — TELEPHONE (OUTPATIENT)
Dept: ONCOLOGY | Facility: CLINIC | Age: 80
End: 2020-06-01

## 2020-06-01 DIAGNOSIS — C91.10 CLL (CHRONIC LYMPHOCYTIC LEUKEMIA) (H): ICD-10-CM

## 2020-06-01 LAB
ALBUMIN SERPL-MCNC: 3.8 G/DL (ref 3.4–5)
ALP SERPL-CCNC: 38 U/L (ref 40–150)
ALT SERPL W P-5'-P-CCNC: 19 U/L (ref 0–70)
ANION GAP SERPL CALCULATED.3IONS-SCNC: 6 MMOL/L (ref 3–14)
ANISOCYTOSIS BLD QL SMEAR: ABNORMAL
AST SERPL W P-5'-P-CCNC: 31 U/L (ref 0–45)
BILIRUB SERPL-MCNC: 0.5 MG/DL (ref 0.2–1.3)
BUN SERPL-MCNC: 40 MG/DL (ref 7–30)
CALCIUM SERPL-MCNC: 8.6 MG/DL (ref 8.5–10.1)
CHLORIDE SERPL-SCNC: 106 MMOL/L (ref 94–109)
CO2 SERPL-SCNC: 26 MMOL/L (ref 20–32)
CREAT SERPL-MCNC: 1.51 MG/DL (ref 0.66–1.25)
DACRYOCYTES BLD QL SMEAR: SLIGHT
DIFFERENTIAL METHOD BLD: ABNORMAL
ERYTHROCYTE [DISTWIDTH] IN BLOOD BY AUTOMATED COUNT: ABNORMAL % (ref 10–15)
GFR SERPL CREATININE-BSD FRML MDRD: 44 ML/MIN/{1.73_M2}
GLUCOSE SERPL-MCNC: 98 MG/DL (ref 70–99)
HCT VFR BLD AUTO: 25.4 % (ref 40–53)
HGB BLD-MCNC: 7.3 G/DL (ref 13.3–17.7)
LDH SERPL L TO P-CCNC: 186 U/L (ref 85–227)
LYMPHOCYTES # BLD AUTO: 141.4 10E9/L (ref 0.8–5.3)
LYMPHOCYTES NFR BLD AUTO: 96 %
MACROCYTES BLD QL SMEAR: PRESENT
MCH RBC QN AUTO: 33 PG (ref 26.5–33)
MCHC RBC AUTO-ENTMCNC: 28.7 G/DL (ref 31.5–36.5)
MCV RBC AUTO: 115 FL (ref 78–100)
MICROCYTES BLD QL SMEAR: PRESENT
NEUTROPHILS # BLD AUTO: 5.9 10E9/L (ref 1.6–8.3)
NEUTROPHILS NFR BLD AUTO: 4 %
NRBC # BLD AUTO: 1.5 10*3/UL
NRBC BLD AUTO-RTO: 1 /100
OVALOCYTES BLD QL SMEAR: SLIGHT
PLATELET # BLD AUTO: 98 10E9/L (ref 150–450)
POIKILOCYTOSIS BLD QL SMEAR: SLIGHT
POTASSIUM SERPL-SCNC: 4.4 MMOL/L (ref 3.4–5.3)
PROT SERPL-MCNC: 7 G/DL (ref 6.8–8.8)
RBC # BLD AUTO: 2.21 10E12/L (ref 4.4–5.9)
SODIUM SERPL-SCNC: 138 MMOL/L (ref 133–144)
WBC # BLD AUTO: 147.3 10E9/L (ref 4–11)

## 2020-06-01 PROCEDURE — 83615 LACTATE (LD) (LDH) ENZYME: CPT | Performed by: INTERNAL MEDICINE

## 2020-06-01 PROCEDURE — 36415 COLL VENOUS BLD VENIPUNCTURE: CPT | Performed by: INTERNAL MEDICINE

## 2020-06-01 PROCEDURE — 80053 COMPREHEN METABOLIC PANEL: CPT | Performed by: INTERNAL MEDICINE

## 2020-06-01 PROCEDURE — 85025 COMPLETE CBC W/AUTO DIFF WBC: CPT | Performed by: INTERNAL MEDICINE

## 2020-06-01 NOTE — TELEPHONE ENCOUNTER
Breana Perry MD Berglund, Courtney C, RN    Cc: Tosha Burris RN    Caller: Unspecified (Today, 11:24 AM)               Patient with CLL.  I will discuss with him at appointment this week.

## 2020-06-01 NOTE — TELEPHONE ENCOUNTER
DATE:  6/1/2020   TIME OF RECEIPT FROM LAB:  1126  LAB TEST:  CBC  LAB VALUE:  .31, Hgb 7.3, Plt 98  Encounter routed to Dr. Perry as high priority to review/advise.    Paige Matthew, JAMESONN, RN, PHN

## 2020-06-03 ENCOUNTER — VIRTUAL VISIT (OUTPATIENT)
Dept: PEDIATRICS | Facility: CLINIC | Age: 80
End: 2020-06-03
Payer: COMMERCIAL

## 2020-06-03 DIAGNOSIS — C91.10 CLL (CHRONIC LYMPHOCYTIC LEUKEMIA) (H): ICD-10-CM

## 2020-06-03 DIAGNOSIS — N18.30 CKD (CHRONIC KIDNEY DISEASE) STAGE 3, GFR 30-59 ML/MIN (H): ICD-10-CM

## 2020-06-03 DIAGNOSIS — I10 HYPERTENSION GOAL BP (BLOOD PRESSURE) < 140/90: ICD-10-CM

## 2020-06-03 DIAGNOSIS — I71.40 ABDOMINAL AORTIC ANEURYSM (AAA) WITHOUT RUPTURE (H): ICD-10-CM

## 2020-06-03 DIAGNOSIS — E11.9 TYPE 2 DIABETES MELLITUS WITHOUT COMPLICATION, WITHOUT LONG-TERM CURRENT USE OF INSULIN (H): Primary | ICD-10-CM

## 2020-06-03 PROCEDURE — 99214 OFFICE O/P EST MOD 30 MIN: CPT | Mod: 95 | Performed by: INTERNAL MEDICINE

## 2020-06-03 RX ORDER — GLIMEPIRIDE 4 MG/1
4 TABLET ORAL
Qty: 90 TABLET | Refills: 1 | Status: ON HOLD | OUTPATIENT
Start: 2020-06-03 | End: 2020-08-24

## 2020-06-03 NOTE — PATIENT INSTRUCTIONS
1. Stop metformin.  2. Increase glimiperide to 4mg daily (I did send a new prescription to your pharmacy).  3. Monitor your blood sugars regularly. I want to make sure that you aren't too low (less than 80) or too high (over 150) very much. Feel free to send these to me through [employer].  4. My nurse will check in with you in ~1 month.    Follow-up with me in 3-6 months.

## 2020-06-03 NOTE — PROGRESS NOTES
"Austin Garza is a 79 year old male who is being evaluated via a billable video visit.      The patient has been notified of following:     \"This video visit will be conducted via a call between you and your physician/provider. We have found that certain health care needs can be provided without the need for an in-person physical exam.  This service lets us provide the care you need with a video conversation.  If a prescription is necessary we can send it directly to your pharmacy.  If lab work is needed we can place an order for that and you can then stop by our lab to have the test done at a later time.    Video visits are billed at different rates depending on your insurance coverage.  Please reach out to your insurance provider with any questions.    If during the course of the call the physician/provider feels a video visit is not appropriate, you will not be charged for this service.\"    Patient has given verbal consent for Video visit? Yes    How would you like to obtain your AVS? [facility]    Patient would like the video invitation sent by: Send to e-mail at: [email or web address]    Will anyone else be joining your video visit? No      Subjective     Austin Garza is a 79 year old male who presents today via video visit for the following health issues:    [facility]  Med check    Benji calls in for follow-up of his medications and his diabetes and high blood pressure. He was recently started on ibrutinib for his CLL.     Yesterday had a tooth pulled.     Has been having a hard time sleeping. No problems falling asleep, but wakes up frequently and then is often up around 3am waking up. Has tried melatonin without success.    Has been having some loose stools. Thought about a year ago that this may be related to his Plavix. Happens about once per week. Seemed to get better when switching back to simvastatin, but then came back. Now he's back on atorvastatin. Now has gotten worse on his new chemotherapy, has been " trying to manage with Imodium.     Of note, hasn't been eating as much with the new chemotherapy.        Video Start Time: 11:04    Patient Active Problem List   Diagnosis     Hearing loss     Dysfunction of eustachian tube     Hypertension goal BP (blood pressure) < 140/90     Diminished Peripheral Pulse     Type 2 diabetes mellitus without complication, without long-term current use of insulin (H)     HYPERLIPIDEMIA LDL GOAL <100     Inflammatory Monoarthritis, Left Hip     Esophageal reflux     Overweight - BMI >35     BCC (basal cell carcinoma), face     Skin lesion of back     CLL (chronic lymphocytic leukemia) (H)     Abdominal aortic aneurysm (AAA) without rupture (H)     Anemia     Elevated prostate specific antigen (PSA)     CKD (chronic kidney disease) stage 3, GFR 30-59 ml/min (H)     Past Surgical History:   Procedure Laterality Date     APPENDECTOMY OPEN       BONE MARROW BIOPSY, BONE SPECIMEN, NEEDLE/TROCAR N/A 2017    Procedure: BIOPSY BONE MARROW;  BONE MARROW BIOPSY;  Surgeon: Shady Garcia MD;  Location:  GI     COLONOSCOPY       ENDOVASCULAR REPAIR ANEURYSM ABDOMINAL AORTA N/A 2017    Procedure: ENDOVASCULAR REPAIR ANEURYSM ABDOMINAL AORTA;  ENDOVASCULAR REPAIR ANEURYSM ABDOMINAL AORTA;  Surgeon: Milton Cazares MD;  Location:  OR     Fistulotomy with marsupialization for repair of fisture in ano       IR ABDOMINAL AORTOGRAM  3/11/2019     IR VISCERAL EMBOLIZATION  2019     Multiple skin tags - resection  1998     Squamous cell skin cancer resection - back         Social History     Tobacco Use     Smoking status: Former Smoker     Packs/day: 0.50     Years: 20.00     Pack years: 10.00     Last attempt to quit: 1975     Years since quittin.4     Smokeless tobacco: Former User   Substance Use Topics     Alcohol use: Yes     Alcohol/week: 10.0 - 14.0 standard drinks     Types: 10 - 14 Standard drinks or equivalent per week     Comment:  "2 drinks a day/wine     Family History   Problem Relation Age of Onset     Cerebrovascular Disease Father         x 2     Hypertension Mother      C.A.D. Mother      Cancer Mother         skin     Eye Disorder Mother         glaucoma     Prostate Cancer No family hx of      Cancer - colorectal No family hx of      Glaucoma No family hx of      Macular Degeneration No family hx of            Reviewed and updated as needed this visit by Provider         Review of Systems   Constitutional, HEENT, cardiovascular, pulmonary, gi and gu systems are negative, except as otherwise noted.      Objective    There were no vitals taken for this visit.  Estimated body mass index is 30.72 kg/m  as calculated from the following:    Height as of 1/23/20: 1.753 m (5' 9\").    Weight as of 1/23/20: 94.3 kg (208 lb).  Physical Exam     GENERAL: Healthy, alert and no distress  EYES: Eyes grossly normal to inspection.  No discharge or erythema, or obvious scleral/conjunctival abnormalities.  RESP: No audible wheeze, cough, or visible cyanosis.  No visible retractions or increased work of breathing.    SKIN: Visible skin clear. No significant rash, abnormal pigmentation or lesions.  NEURO: Cranial nerves grossly intact.  Mentation and speech appropriate for age.  PSYCH: Mentation appears normal, affect normal/bright, judgement and insight intact, normal speech and appearance well-groomed.      Diagnostic Test Results:  Labs reviewed in Epic        Assessment & Plan     1. Type 2 diabetes mellitus without complication, without long-term current use of insulin (H)  Well controlled, but recently with worsening renal function (perhaps secondary to CLL or new chemo). No longer safe to use metformin as Cr frequently >1.5, will discontinue. discussed medication options with patient, including increasing glimepiride vs switching to SGLT2 (already has issues with early satiety from chemo, making GLP-1/DDP4 less ideal). He would like to start with " "increasing the glimepiride. Discussed watching for hypoglycemia. He will follow his blood sugars closely with this change.   - glimepiride (AMARYL) 4 MG tablet; Take 1 tablet (4 mg) by mouth every morning (before breakfast)  Dispense: 90 tablet; Refill: 1  - blood glucose (NO BRAND SPECIFIED) test strip; Use to test blood sugar 1-2 times daily or as directed (adjusting oral medications).  Dispense: 100 each; Refill: 1  - Lipid panel reflex to direct LDL Fasting; Future  - **A1C FUTURE anytime; Future    2. CKD (chronic kidney disease) stage 3, GFR 30-59 ml/min (H)  As above. Monitoring, avoiding nephrotoxic meds.     3. Hypertension goal BP (blood pressure) < 140/90  Well controlled.     4. Abdominal aortic aneurysm (AAA) without rupture (H)  S/p repair, on clopidogrel. Doing well.     5. CLL (chronic lymphocytic leukemia) (H)  Following with Heme/Onc, on chemo.        BMI:   Estimated body mass index is 30.72 kg/m  as calculated from the following:    Height as of 1/23/20: 1.753 m (5' 9\").    Weight as of 1/23/20: 94.3 kg (208 lb).         Patient Instructions   1. Stop metformin.  2. Increase glimiperide to 4mg daily (I did send a new prescription to your pharmacy).  3. Monitor your blood sugars regularly. I want to make sure that you aren't too low (less than 80) or too high (over 150) very much. Feel free to send these to me through [employer].  4. My nurse will check in with you in ~1 month.    Follow-up with me in 3-6 months.       No follow-ups on file.    Sandi Nguyen MD  [facility]      Video-Visit Details    Type of service:  Video Visit    Video End Time:11:23    Originating Location (pt. Location): Home    Distant Location (provider location):  [facility]     Platform used for Video Visit: Doximity    No follow-ups on file.       Sandi Nguyen MD        "

## 2020-06-04 ENCOUNTER — VIRTUAL VISIT (OUTPATIENT)
Dept: ONCOLOGY | Facility: CLINIC | Age: 80
End: 2020-06-04
Attending: INTERNAL MEDICINE
Payer: COMMERCIAL

## 2020-06-04 DIAGNOSIS — C91.10 CLL (CHRONIC LYMPHOCYTIC LEUKEMIA) (H): Primary | ICD-10-CM

## 2020-06-04 PROCEDURE — 99214 OFFICE O/P EST MOD 30 MIN: CPT | Mod: 95 | Performed by: INTERNAL MEDICINE

## 2020-06-04 PROCEDURE — 40001009 ZZH VIDEO/TELEPHONE VISIT; NO CHARGE

## 2020-06-04 NOTE — PROGRESS NOTES
"Austin Garza is a 79 year old male who is being evaluated via a billable video visit.      The patient has been notified of following:     \"This video visit will be conducted via a call between you and your physician/provider. We have found that certain health care needs can be provided without the need for an in-person physical exam.  This service lets us provide the care you need with a video conversation.  If a prescription is necessary we can send it directly to your pharmacy.  If lab work is needed we can place an order for that and you can then stop by our lab to have the test done at a later time.    Video visits are billed at different rates depending on your insurance coverage.  Please reach out to your insurance provider with any questions.    If during the course of the call the physician/provider feels a video visit is not appropriate, you will not be charged for this service.\"    Patient has given verbal consent for Video visit? Yes    How would you like to obtain your AVS? [facility]    Patient would like the video invitation sent by: Text to cell phone: [phone]    Will anyone else be joining your video visit? Eli Pinto CMA        Video-Visit Details    Type of service:  Video Visit    Video Start Time: 3:47 PM  Video End Time: 3:53 PM    Originating Location (pt. Location): Home    Distant Location (provider location):  [facility]     Platform used for Video Visit: Well          [facility]    Hematology/Oncology Established Patient Note      Today's Date: 6/04/20    Reason for Follow-up: CLL      HISTORY OF PRESENT ILLNESS: Austin Garza is a 79 year old male with PMHx of DMII, HTN who presented with leukocytosis.  In November 2017, he was found to have elevated WBC of 61.7K, hemoglobin of 10.4, and platelet of 115K.  There were no other CBC results available between 2010 and 2017.  Prior to 2010, he had normal CBC, with normal to mild anemia, and " mild thrombocytopenia.   He was asymptomatic.    He underwent bone marrow biopsy on 11/21/17, which was consistent with chronic lymphocytic leukemia/small lymphocytic lymphoma, with 13% ringed sideroblasts identified.  The presence of increased numbers of ringed sideroblasts raises the possibility of an associated myelodysplastic syndrome.  Dysplastic changes are not identified though.  Many cases exhibiting ringed sideroblasts are metabolic origin, without significant risk of progression to acute leukemia, and these typically do not show dysplastic morphology as is the case with this specimen.  15% ringed sideroblasts are required for a diagnosis for RARS, which this specimen does not meet.  Flow cytometry is also consistent with CLL/SLL.  Cytogenetics show two (10%) of the metaphase cells comprise a clone characterized by a deletion within the proximal long arm of a chromosome 13 as the sole karyotypic abnormality.  Three (15%) metaphases had loss of the Y chromosome as the sole abnormality.    CT scan on 11/29/17 shows mildly enlarged left axillary lymph node and periaortic lymph nodes in the retroperitoneum of the abdomen.  Spleen is also enlarged.    On his staging CT scan for CLL, he was incidentally found to have a large infrarenal abdominal aortic aneurysm, measuring 7.6 cm.  He was seen by Dr. Thomas, and he underwent EVAR on 12/4/17.     He started ibrutinib on 5/4/20.      INTERIM HISTORY: Austin says that he feels well.  He had his tooth extracted on 5/28/20.  He held his ibrutinib and is going to resume it tomorrow.        REVIEW OF SYSTEMS:   14 point ROS was reviewed and is negative other than as noted above in HPI.       HOME MEDICATIONS:  Current Outpatient Medications   Medication Sig Dispense Refill     aspirin (ASA) 81 MG tablet Take 1 tablet (81 mg) by mouth every evening (Patient taking differently: Take 325 mg by mouth every evening ) 90 tablet 3     atorvastatin (LIPITOR) 20 MG tablet Take 1  tablet (20 mg) by mouth At Bedtime 90 tablet 3     blood glucose (NO BRAND SPECIFIED) test strip Use to test blood sugar 1-2 times daily or as directed (adjusting oral medications). 100 each 1     clopidogrel (PLAVIX) 75 MG tablet TAKE 1 TABLET BY MOUTH DAILY 90 tablet 1     glimepiride (AMARYL) 4 MG tablet Take 1 tablet (4 mg) by mouth every morning (before breakfast) 90 tablet 1     ibrutinib (IMBRUVICA) 420 MG tablet Take 1 tablet (420 mg) by mouth daily 28 tablet 0     ibrutinib (IMBRUVICA) 420 MG tablet Take 1 tablet (420 mg) by mouth daily 28 tablet 0     losartan-hydrochlorothiazide (HYZAAR) 100-25 MG tablet Take 1 tablet by mouth daily 90 tablet 3     Multiple Vitamins-Minerals (CENTRUM SILVER) per tablet Take 1 tablet by mouth daily           ALLERGIES:  Allergies   Allergen Reactions     Ace Inhibitors      cough         PAST MEDICAL HISTORY:  Past Medical History:   Diagnosis Date     AAA (abdominal aortic aneurysm)      BCC (basal cell carcinoma of skin) - face      CLL (chronic lymphocytic leukemia)      Diaphragmatic hernia      Diverticulitis of colon      Esophageal reflux      Essential hypertension      Hyperlipidemia      Irritable bowel syndrome      Macular degeneration (senile) of retina      Squamous Cell Carcinoma, Back 1988     Type 2 diabetes mellitus          PAST SURGICAL HISTORY:  Past Surgical History:   Procedure Laterality Date     APPENDECTOMY OPEN  1966     BONE MARROW BIOPSY, BONE SPECIMEN, NEEDLE/TROCAR N/A 11/21/2017    Procedure: BIOPSY BONE MARROW;  BONE MARROW BIOPSY;  Surgeon: Shady Garcia MD;  Location:  GI     COLONOSCOPY  2002     ENDOVASCULAR REPAIR ANEURYSM ABDOMINAL AORTA N/A 12/4/2017    Procedure: ENDOVASCULAR REPAIR ANEURYSM ABDOMINAL AORTA;  ENDOVASCULAR REPAIR ANEURYSM ABDOMINAL AORTA;  Surgeon: Milton Cazares MD;  Location:  OR     Fistulotomy with marsupialization for repair of fisture in ano  2007     IR ABDOMINAL AORTOGRAM  3/11/2019      IR VISCERAL EMBOLIZATION  2019     Multiple skin tags - resection  1998     Squamous cell skin cancer resection - back           SOCIAL HISTORY:  Social History     Socioeconomic History     Marital status:      Spouse name: librado     Number of children: 0     Years of education: 17     Highest education level: Not on file   Occupational History     Occupation: retired   Social Needs     Financial resource strain: Not on file     Food insecurity     Worry: Not on file     Inability: Not on file     Transportation needs     Medical: Not on file     Non-medical: Not on file   Tobacco Use     Smoking status: Former Smoker     Packs/day: 0.50     Years: 20.00     Pack years: 10.00     Last attempt to quit: 1975     Years since quittin.4     Smokeless tobacco: Former User   Substance and Sexual Activity     Alcohol use: Yes     Alcohol/week: 10.0 - 14.0 standard drinks     Types: 10 - 14 Standard drinks or equivalent per week     Comment: 2 drinks a day/wine     Drug use: No     Sexual activity: Never   Lifestyle     Physical activity     Days per week: Not on file     Minutes per session: Not on file     Stress: Not on file   Relationships     Social connections     Talks on phone: Not on file     Gets together: Not on file     Attends Oriental orthodox service: Not on file     Active member of club or organization: Not on file     Attends meetings of clubs or organizations: Not on file     Relationship status: Not on file     Intimate partner violence     Fear of current or ex partner: Not on file     Emotionally abused: Not on file     Physically abused: Not on file     Forced sexual activity: Not on file   Other Topics Concern     Parent/sibling w/ CABG, MI or angioplasty before 65F 55M? Not Asked   Social History Narrative     Not on file   He quit smoking in .  He drinks 1-3 glasses of wine a night with dinner.  He is retired, and used to work as a .  He lives with his wife in [place].   His cousin had lung cancer and  around age 69.  Otherwise, he denies family history of cancer.        FAMILY HISTORY:  Family History   Problem Relation Age of Onset     Cerebrovascular Disease Father         x 2     Hypertension Mother      C.A.D. Mother      Cancer Mother         skin     Eye Disorder Mother         glaucoma     Prostate Cancer No family hx of      Cancer - colorectal No family hx of      Glaucoma No family hx of      Macular Degeneration No family hx of          PHYSICAL EXAM:  Vital signs:  There were no vitals taken for this visit.   ECO  GENERAL/CONSTITUTIONAL: No acute distress. Healthy, alert.  EYES: No scleral icterus.  No redness or discharge.    RESPIRATORY: No audible wheeze, cough, or visible cyanosis.  No visible retractions or increased work of breathing.  Able to speak fully in complete sentences.  MUSCULOSKELETAL: Normal range of motion.  NEUROLOGIC: Alert, oriented, answers questions appropriately. No tremor. Mentation intact and speech normal  INTEGUMENTARY: No jaundice.  No obvious rash or skin lesions.  PSYCHIATRIC:  Mentation appears normal, affect normal/bright, judgement and insight intact, normal speech and appearance well-groomed.    The rest of a comprehensive physical exam is deferred due to public health emergency video visit restrictions.        LABS:  CBC RESULTS:   Recent Labs   Lab Test 20  1039   .3*   RBC 2.21*   HGB 7.3*   HCT 25.4*   *   MCH 33.0   MCHC 28.7*   RDW Not Calculated   PLT 98*         ASSESSMENT/PLAN:  Austin Garza is a 79 year old male with:    1) CLL/SLL: BLACKWOOD stage III, with lymphocytosis, lymphadenopathy, splenomegaly, and anemia.  He has mild thrombocytopenia as well, but is above 100K.  He presented with leukocytosis with WBC of 61.7K, hemoglobin of 10.4, and platelet count of 115K on diagnosis.  Bone marrow biopsy and flow cytometry confirmed CLL/SLL.  CT scan shows mildly enlarged left axillary lymph node and  periaortic lymph nodes in the retroperitoneum of the abdomen, with enlarged spleen.      Due to worsening lymphocyte count, anemia, thrombocytopenia, as well as fatigue and night sweats, he started ibrutinib on 5/4/20.      His WBC increased to 147K.  It is likely because he has held ibrutinib for the past week, and there can be a rebound effect.  He is going to resume it tomorrow and we can continue close follow-up of his labs.  His hemoglobin is 7.3.  We discussed blood transfusion, but since he feels fine, we decided to hold off and check labs again in 2 weeks or if he becomes symptomatic.  If hemoglobin is <7, we will schedule blood transfusion.      -continue ibrutinib.  We discussed potential side effects, including bleeding risk.  He is not on warfarin and has no history of arrhythmias.  He is on aspirin and Plavix though.  I discussed with pharmacy.  There is no contraindication, but we will monitor closely for bleeding.    -pharmacy following  -continue same dose for now, depending on if his diarrhea worsens/improves - see below  -labs in 2 weeks  -RTC in 4 weeks with labs    2) Diarrhea: He is on Imodium, which helps, but if he takes it too much, he gets constipation.  He is now taking the Imodium every other day.    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 He completed [facility] at [facility] with planned 60 Gy x 30 fractions. He has completed radiation and noted that he did not need any more follow-up for this.    4) Abdominal aortic aneurysm: measures up to 7.6 cm on CT scan, incidentally found.  He underwent EVAR on 12/4/17.  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  He is on Plavix now.  On 5/13/19, he underwent and percutaneous aneurysm sac puncture and endo leak embolization by IR on 5/13/19.  -follow-up with vascular surgery  and IR    5) Diabetes, hypertension:  -following with PCP      Breana Perry MD  Hematology/Oncology  [facility]

## 2020-06-04 NOTE — LETTER
"    6/4/2020         RE: Austin Garza  [address] [place] [address]        Dear Colleague,    Thank you for referring your patient, Austin Garza, to the [facility]. Please see a copy of my visit note below.    Austin Garza is a 79 year old male who is being evaluated via a billable video visit.      The patient has been notified of following:     \"This video visit will be conducted via a call between you and your physician/provider. We have found that certain health care needs can be provided without the need for an in-person physical exam.  This service lets us provide the care you need with a video conversation.  If a prescription is necessary we can send it directly to your pharmacy.  If lab work is needed we can place an order for that and you can then stop by our lab to have the test done at a later time.    Video visits are billed at different rates depending on your insurance coverage.  Please reach out to your insurance provider with any questions.    If during the course of the call the physician/provider feels a video visit is not appropriate, you will not be charged for this service.\"    Patient has given verbal consent for Video visit? Yes    How would you like to obtain your AVS? MyChart    Patient would like the video invitation sent by: Text to cell phone: [phone]    Will anyone else be joining your video visit? No    Ita Pinto CMA        Video-Visit Details    Type of service:  Video Visit    Video Start Time: 3:47 PM  Video End Time: 3:53 PM    Originating Location (pt. Location): Home    Distant Location (provider location):  [facility]     Platform used for Video Visit: [email or web address]          [facility]    Hematology/Oncology Established Patient Note      Today's Date: 6/04/20    Reason for Follow-up: CLL      HISTORY OF PRESENT ILLNESS: Austin Garza is a 79 year old male with PMHx of DMII, HTN who presented with " leukocytosis.  In November 2017, he was found to have elevated WBC of 61.7K, hemoglobin of 10.4, and platelet of 115K.  There were no other CBC results available between 2010 and 2017.  Prior to 2010, he had normal CBC, with normal to mild anemia, and mild thrombocytopenia.   He was asymptomatic.    He underwent bone marrow biopsy on 11/21/17, which was consistent with chronic lymphocytic leukemia/small lymphocytic lymphoma, with 13% ringed sideroblasts identified.  The presence of increased numbers of ringed sideroblasts raises the possibility of an associated myelodysplastic syndrome.  Dysplastic changes are not identified though.  Many cases exhibiting ringed sideroblasts are metabolic origin, without significant risk of progression to acute leukemia, and these typically do not show dysplastic morphology as is the case with this specimen.  15% ringed sideroblasts are required for a diagnosis for RARS, which this specimen does not meet.  Flow cytometry is also consistent with CLL/SLL.  Cytogenetics show two (10%) of the metaphase cells comprise a clone characterized by a deletion within the proximal long arm of a chromosome 13 as the sole karyotypic abnormality.  Three (15%) metaphases had loss of the Y chromosome as the sole abnormality.    CT scan on 11/29/17 shows mildly enlarged left axillary lymph node and periaortic lymph nodes in the retroperitoneum of the abdomen.  Spleen is also enlarged.    On his staging CT scan for CLL, he was incidentally found to have a large infrarenal abdominal aortic aneurysm, measuring 7.6 cm.  He was seen by Dr. Thomas, and he underwent EVAR on 12/4/17.     He started ibrutinib on 5/4/20.      INTERIM HISTORY: Austin says that he feels well.  He had his tooth extracted on 5/28/20.  He held his ibrutinib and is going to resume it tomorrow.        REVIEW OF SYSTEMS:   14 point ROS was reviewed and is negative other than as noted above in HPI.       HOME MEDICATIONS:  Current  Outpatient Medications   Medication Sig Dispense Refill     aspirin (ASA) 81 MG tablet Take 1 tablet (81 mg) by mouth every evening (Patient taking differently: Take 325 mg by mouth every evening ) 90 tablet 3     atorvastatin (LIPITOR) 20 MG tablet Take 1 tablet (20 mg) by mouth At Bedtime 90 tablet 3     blood glucose (NO BRAND SPECIFIED) test strip Use to test blood sugar 1-2 times daily or as directed (adjusting oral medications). 100 each 1     clopidogrel (PLAVIX) 75 MG tablet TAKE 1 TABLET BY MOUTH DAILY 90 tablet 1     glimepiride (AMARYL) 4 MG tablet Take 1 tablet (4 mg) by mouth every morning (before breakfast) 90 tablet 1     ibrutinib (IMBRUVICA) 420 MG tablet Take 1 tablet (420 mg) by mouth daily 28 tablet 0     ibrutinib (IMBRUVICA) 420 MG tablet Take 1 tablet (420 mg) by mouth daily 28 tablet 0     losartan-hydrochlorothiazide (HYZAAR) 100-25 MG tablet Take 1 tablet by mouth daily 90 tablet 3     Multiple Vitamins-Minerals (CENTRUM SILVER) per tablet Take 1 tablet by mouth daily           ALLERGIES:  Allergies   Allergen Reactions     Ace Inhibitors      cough         PAST MEDICAL HISTORY:  Past Medical History:   Diagnosis Date     AAA (abdominal aortic aneurysm)      BCC (basal cell carcinoma of skin) - face      CLL (chronic lymphocytic leukemia)      Diaphragmatic hernia      Diverticulitis of colon      Esophageal reflux      Essential hypertension      Hyperlipidemia      Irritable bowel syndrome      Macular degeneration (senile) of retina      Squamous Cell Carcinoma, Back 1988     Type 2 diabetes mellitus          PAST SURGICAL HISTORY:  Past Surgical History:   Procedure Laterality Date     APPENDECTOMY OPEN  1966     BONE MARROW BIOPSY, BONE SPECIMEN, NEEDLE/TROCAR N/A 11/21/2017    Procedure: BIOPSY BONE MARROW;  BONE MARROW BIOPSY;  Surgeon: Shady Garcia MD;  Location:  GI     COLONOSCOPY  2002     ENDOVASCULAR REPAIR ANEURYSM ABDOMINAL AORTA N/A 12/4/2017    Procedure:  ENDOVASCULAR REPAIR ANEURYSM ABDOMINAL AORTA;  ENDOVASCULAR REPAIR ANEURYSM ABDOMINAL AORTA;  Surgeon: Milton Cazares MD;  Location: [facility] OR     Fistulotomy with marsupialization for repair of fisture in ano       IR ABDOMINAL AORTOGRAM  3/11/2019     IR VISCERAL EMBOLIZATION  2019     Multiple skin tags - resection  1998     Squamous cell skin cancer resection - back           SOCIAL HISTORY:  Social History     Socioeconomic History     Marital status:      Spouse name: librado     Number of children: 0     Years of education: 17     Highest education level: Not on file   Occupational History     Occupation: retired   Social Needs     Financial resource strain: Not on file     Food insecurity     Worry: Not on file     Inability: Not on file     Transportation needs     Medical: Not on file     Non-medical: Not on file   Tobacco Use     Smoking status: Former Smoker     Packs/day: 0.50     Years: 20.00     Pack years: 10.00     Last attempt to quit: 1975     Years since quittin.4     Smokeless tobacco: Former User   Substance and Sexual Activity     Alcohol use: Yes     Alcohol/week: 10.0 - 14.0 standard drinks     Types: 10 - 14 Standard drinks or equivalent per week     Comment: 2 drinks a day/wine     Drug use: No     Sexual activity: Never   Lifestyle     Physical activity     Days per week: Not on file     Minutes per session: Not on file     Stress: Not on file   Relationships     Social connections     Talks on phone: Not on file     Gets together: Not on file     Attends Yarsanism service: Not on file     Active member of club or organization: Not on file     Attends meetings of clubs or organizations: Not on file     Relationship status: Not on file     Intimate partner violence     Fear of current or ex partner: Not on file     Emotionally abused: Not on file     Physically abused: Not on file     Forced sexual activity: Not on file   Other Topics Concern      Parent/sibling w/ CABG, MI or angioplasty before 65F 55M? Not Asked   Social History Narrative     Not on file   He quit smoking in .  He drinks 1-3 glasses of wine a night with dinner.  He is retired, and used to work as a .  He lives with his wife in [place].  His cousin had lung cancer and  around age 69.  Otherwise, he denies family history of cancer.        FAMILY HISTORY:  Family History   Problem Relation Age of Onset     Cerebrovascular Disease Father         x 2     Hypertension Mother      C.A.D. Mother      Cancer Mother         skin     Eye Disorder Mother         glaucoma     Prostate Cancer No family hx of      Cancer - colorectal No family hx of      Glaucoma No family hx of      Macular Degeneration No family hx of          PHYSICAL EXAM:  Vital signs:  There were no vitals taken for this visit.   ECO  GENERAL/CONSTITUTIONAL: No acute distress. Healthy, alert.  EYES: No scleral icterus.  No redness or discharge.    RESPIRATORY: No audible wheeze, cough, or visible cyanosis.  No visible retractions or increased work of breathing.  Able to speak fully in complete sentences.  MUSCULOSKELETAL: Normal range of motion.  NEUROLOGIC: Alert, oriented, answers questions appropriately. No tremor. Mentation intact and speech normal  INTEGUMENTARY: No jaundice.  No obvious rash or skin lesions.  PSYCHIATRIC:  Mentation appears normal, affect normal/bright, judgement and insight intact, normal speech and appearance well-groomed.    The rest of a comprehensive physical exam is deferred due to public health emergency video visit restrictions.        LABS:  CBC RESULTS:   Recent Labs   Lab Test 20  1039   .3*   RBC 2.21*   HGB 7.3*   HCT 25.4*   *   MCH 33.0   MCHC 28.7*   RDW Not Calculated   PLT 98*         ASSESSMENT/PLAN:  Austin Garza is a 79 year old male with:    1) CLL/SLL: BLACKWOOD stage III, with lymphocytosis, lymphadenopathy, splenomegaly, and anemia.  He has mild  thrombocytopenia as well, but is above 100K.  He presented with leukocytosis with WBC of 61.7K, hemoglobin of 10.4, and platelet count of 115K on diagnosis.  Bone marrow biopsy and flow cytometry confirmed CLL/SLL.  CT scan shows mildly enlarged left axillary lymph node and periaortic lymph nodes in the retroperitoneum of the abdomen, with enlarged spleen.      Due to worsening lymphocyte count, anemia, thrombocytopenia, as well as fatigue and night sweats, he started ibrutinib on 5/4/20.      His WBC increased to 147K.  It is likely because he has held ibrutinib for the past week, and there can be a rebound effect.  He is going to resume it tomorrow and we can continue close follow-up of his labs.  His hemoglobin is 7.3.  We discussed blood transfusion, but since he feels fine, we decided to hold off and check labs again in 2 weeks or if he becomes symptomatic.  If hemoglobin is <7, we will schedule blood transfusion.      -continue ibrutinib.  We discussed potential side effects, including bleeding risk.  He is not on warfarin and has no history of arrhythmias.  He is on aspirin and Plavix though.  I discussed with pharmacy.  There is no contraindication, but we will monitor closely for bleeding.    -pharmacy following  -continue same dose for now, depending on if his diarrhea worsens/improves - see below  -labs in 2 weeks  -RTC in 4 weeks with labs    2) Diarrhea: He is on Imodium, which helps, but if he takes it too much, he gets constipation.  He is now taking the Imodium every other day.    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 He completed radiation at [facility] with planned 60 Gy x 30 fractions. He has completed radiation and noted that he did not need any more follow-up for this.    4) Abdominal aortic aneurysm: measures up to 7.6 cm on CT scan,  incidentally found.  He underwent EVAR on 12/4/17.  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  He is on Plavix now.  On 5/13/19, he underwent and percutaneous aneurysm sac puncture and endo leak embolization by IR on 5/13/19.  -follow-up with vascular surgery and IR    5) Diabetes, hypertension:  -following with PCP      Breana Perry MD  Hematology/Oncology  [facility]                Again, thank you for allowing me to participate in the care of your patient.        Sincerely,        Breana Perry MD

## 2020-06-04 NOTE — LETTER
"    6/4/2020         RE: Austin Garza  [address] [place] [address]        Dear Colleague,    Thank you for referring your patient, Austin Garza, to the [facility]. Please see a copy of my visit note below.    Austin Garza is a 79 year old male who is being evaluated via a billable video visit.      The patient has been notified of following:     \"This video visit will be conducted via a call between you and your physician/provider. We have found that certain health care needs can be provided without the need for an in-person physical exam.  This service lets us provide the care you need with a video conversation.  If a prescription is necessary we can send it directly to your pharmacy.  If lab work is needed we can place an order for that and you can then stop by our lab to have the test done at a later time.    Video visits are billed at different rates depending on your insurance coverage.  Please reach out to your insurance provider with any questions.    If during the course of the call the physician/provider feels a video visit is not appropriate, you will not be charged for this service.\"    Patient has given verbal consent for Video visit? Yes    How would you like to obtain your AVS? MyChart    Patient would like the video invitation sent by: Text to cell phone: [phone]    Will anyone else be joining your video visit? No    Ita Pinto CMA        Video-Visit Details    Type of service:  Video Visit    Video Start Time: 3:47 PM  Video End Time: 3:53 PM    Originating Location (pt. Location): Home    Distant Location (provider location):  [facility]     Platform used for Video Visit: [employer]          [facility]    Hematology/Oncology Established Patient Note      Today's Date: 6/04/20    Reason for Follow-up: CLL      HISTORY OF PRESENT ILLNESS: Austin Garza is a 79 year old male with PMHx of DMII, HTN who presented with " leukocytosis.  In November 2017, he was found to have elevated WBC of 61.7K, hemoglobin of 10.4, and platelet of 115K.  There were no other CBC results available between 2010 and 2017.  Prior to 2010, he had normal CBC, with normal to mild anemia, and mild thrombocytopenia.   He was asymptomatic.    He underwent bone marrow biopsy on 11/21/17, which was consistent with chronic lymphocytic leukemia/small lymphocytic lymphoma, with 13% ringed sideroblasts identified.  The presence of increased numbers of ringed sideroblasts raises the possibility of an associated myelodysplastic syndrome.  Dysplastic changes are not identified though.  Many cases exhibiting ringed sideroblasts are metabolic origin, without significant risk of progression to acute leukemia, and these typically do not show dysplastic morphology as is the case with this specimen.  15% ringed sideroblasts are required for a diagnosis for RARS, which this specimen does not meet.  Flow cytometry is also consistent with CLL/SLL.  Cytogenetics show two (10%) of the metaphase cells comprise a clone characterized by a deletion within the proximal long arm of a chromosome 13 as the sole karyotypic abnormality.  Three (15%) metaphases had loss of the Y chromosome as the sole abnormality.    CT scan on 11/29/17 shows mildly enlarged left axillary lymph node and periaortic lymph nodes in the retroperitoneum of the abdomen.  Spleen is also enlarged.    On his staging CT scan for CLL, he was incidentally found to have a large infrarenal abdominal aortic aneurysm, measuring 7.6 cm.  He was seen by Dr. Thomas, and he underwent EVAR on 12/4/17.     He started ibrutinib on 5/4/20.      INTERIM HISTORY: Austin says that he feels well.  He had his tooth extracted on 5/28/20.  He held his ibrutinib and is going to resume it tomorrow.        REVIEW OF SYSTEMS:   14 point ROS was reviewed and is negative other than as noted above in HPI.       HOME MEDICATIONS:  Current  Outpatient Medications   Medication Sig Dispense Refill     aspirin (ASA) 81 MG tablet Take 1 tablet (81 mg) by mouth every evening (Patient taking differently: Take 325 mg by mouth every evening ) 90 tablet 3     atorvastatin (LIPITOR) 20 MG tablet Take 1 tablet (20 mg) by mouth At Bedtime 90 tablet 3     blood glucose (NO BRAND SPECIFIED) test strip Use to test blood sugar 1-2 times daily or as directed (adjusting oral medications). 100 each 1     clopidogrel (PLAVIX) 75 MG tablet TAKE 1 TABLET BY MOUTH DAILY 90 tablet 1     glimepiride (AMARYL) 4 MG tablet Take 1 tablet (4 mg) by mouth every morning (before breakfast) 90 tablet 1     ibrutinib (IMBRUVICA) 420 MG tablet Take 1 tablet (420 mg) by mouth daily 28 tablet 0     ibrutinib (IMBRUVICA) 420 MG tablet Take 1 tablet (420 mg) by mouth daily 28 tablet 0     losartan-hydrochlorothiazide (HYZAAR) 100-25 MG tablet Take 1 tablet by mouth daily 90 tablet 3     Multiple Vitamins-Minerals (CENTRUM SILVER) per tablet Take 1 tablet by mouth daily           ALLERGIES:  Allergies   Allergen Reactions     Ace Inhibitors      cough         PAST MEDICAL HISTORY:  Past Medical History:   Diagnosis Date     AAA (abdominal aortic aneurysm)      BCC (basal cell carcinoma of skin) - face      CLL (chronic lymphocytic leukemia)      Diaphragmatic hernia      Diverticulitis of colon      Esophageal reflux      Essential hypertension      Hyperlipidemia      Irritable bowel syndrome      Macular degeneration (senile) of retina      Squamous Cell Carcinoma, Back 1988     Type 2 diabetes mellitus          PAST SURGICAL HISTORY:  Past Surgical History:   Procedure Laterality Date     APPENDECTOMY OPEN  1966     BONE MARROW BIOPSY, BONE SPECIMEN, NEEDLE/TROCAR N/A 11/21/2017    Procedure: BIOPSY BONE MARROW;  BONE MARROW BIOPSY;  Surgeon: Shady Garcia MD;  Location:  GI     COLONOSCOPY  2002     ENDOVASCULAR REPAIR ANEURYSM ABDOMINAL AORTA N/A 12/4/2017    Procedure:  ENDOVASCULAR REPAIR ANEURYSM ABDOMINAL AORTA;  ENDOVASCULAR REPAIR ANEURYSM ABDOMINAL AORTA;  Surgeon: Milton Cazares MD;  Location: [facility] OR     Fistulotomy with marsupialization for repair of fisture in ano       IR ABDOMINAL AORTOGRAM  3/11/2019     IR VISCERAL EMBOLIZATION  2019     Multiple skin tags - resection  1998     Squamous cell skin cancer resection - back           SOCIAL HISTORY:  Social History     Socioeconomic History     Marital status:      Spouse name: librado     Number of children: 0     Years of education: 17     Highest education level: Not on file   Occupational History     Occupation: retired   Social Needs     Financial resource strain: Not on file     Food insecurity     Worry: Not on file     Inability: Not on file     Transportation needs     Medical: Not on file     Non-medical: Not on file   Tobacco Use     Smoking status: Former Smoker     Packs/day: 0.50     Years: 20.00     Pack years: 10.00     Last attempt to quit: 1975     Years since quittin.4     Smokeless tobacco: Former User   Substance and Sexual Activity     Alcohol use: Yes     Alcohol/week: 10.0 - 14.0 standard drinks     Types: 10 - 14 Standard drinks or equivalent per week     Comment: 2 drinks a day/wine     Drug use: No     Sexual activity: Never   Lifestyle     Physical activity     Days per week: Not on file     Minutes per session: Not on file     Stress: Not on file   Relationships     Social connections     Talks on phone: Not on file     Gets together: Not on file     Attends Jehovah's witness service: Not on file     Active member of club or organization: Not on file     Attends meetings of clubs or organizations: Not on file     Relationship status: Not on file     Intimate partner violence     Fear of current or ex partner: Not on file     Emotionally abused: Not on file     Physically abused: Not on file     Forced sexual activity: Not on file   Other Topics Concern      Parent/sibling w/ CABG, MI or angioplasty before 65F 55M? Not Asked   Social History Narrative     Not on file   He quit smoking in .  He drinks 1-3 glasses of wine a night with dinner.  He is retired, and used to work as a .  He lives with his wife in [place].  His cousin had lung cancer and  around age 69.  Otherwise, he denies family history of cancer.        FAMILY HISTORY:  Family History   Problem Relation Age of Onset     Cerebrovascular Disease Father         x 2     Hypertension Mother      C.A.D. Mother      Cancer Mother         skin     Eye Disorder Mother         glaucoma     Prostate Cancer No family hx of      Cancer - colorectal No family hx of      Glaucoma No family hx of      Macular Degeneration No family hx of          PHYSICAL EXAM:  Vital signs:  There were no vitals taken for this visit.   ECO  GENERAL/CONSTITUTIONAL: No acute distress. Healthy, alert.  EYES: No scleral icterus.  No redness or discharge.    RESPIRATORY: No audible wheeze, cough, or visible cyanosis.  No visible retractions or increased work of breathing.  Able to speak fully in complete sentences.  MUSCULOSKELETAL: Normal range of motion.  NEUROLOGIC: Alert, oriented, answers questions appropriately. No tremor. Mentation intact and speech normal  INTEGUMENTARY: No jaundice.  No obvious rash or skin lesions.  PSYCHIATRIC:  Mentation appears normal, affect normal/bright, judgement and insight intact, normal speech and appearance well-groomed.    The rest of a comprehensive physical exam is deferred due to public health emergency video visit restrictions.        LABS:  CBC RESULTS:   Recent Labs   Lab Test 20  1039   .3*   RBC 2.21*   HGB 7.3*   HCT 25.4*   *   MCH 33.0   MCHC 28.7*   RDW Not Calculated   PLT 98*         ASSESSMENT/PLAN:  Austin Garza is a 79 year old male with:    1) CLL/SLL: BLACKWOOD stage III, with lymphocytosis, lymphadenopathy, splenomegaly, and anemia.  He has mild  thrombocytopenia as well, but is above 100K.  He presented with leukocytosis with WBC of 61.7K, hemoglobin of 10.4, and platelet count of 115K on diagnosis.  Bone marrow biopsy and flow cytometry confirmed CLL/SLL.  CT scan shows mildly enlarged left axillary lymph node and periaortic lymph nodes in the retroperitoneum of the abdomen, with enlarged spleen.      Due to worsening lymphocyte count, anemia, thrombocytopenia, as well as fatigue and night sweats, he started ibrutinib on 5/4/20.      His WBC increased to 147K.  It is likely because he has held ibrutinib for the past week, and there can be a rebound effect.  He is going to resume it tomorrow and we can continue close follow-up of his labs.  His hemoglobin is 7.3.  We discussed blood transfusion, but since he feels fine, we decided to hold off and check labs again in 2 weeks or if he becomes symptomatic.  If hemoglobin is <7, we will schedule blood transfusion.      -continue ibrutinib.  We discussed potential side effects, including bleeding risk.  He is not on warfarin and has no history of arrhythmias.  He is on aspirin and Plavix though.  I discussed with pharmacy.  There is no contraindication, but we will monitor closely for bleeding.    -pharmacy following  -continue same dose for now, depending on if his diarrhea worsens/improves - see below  -labs in 2 weeks  -RTC in 4 weeks with labs    2) Diarrhea: He is on Imodium, which helps, but if he takes it too much, he gets constipation.  He is now taking the Imodium every other day.    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 He completed radiation at [facility] with planned 60 Gy x 30 fractions. He has completed radiation and noted that he did not need any more follow-up for this.    4) Abdominal aortic aneurysm: measures up to 7.6 cm on CT scan,  incidentally found.  He underwent EVAR on 12/4/17.  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  He is on Plavix now.  On 5/13/19, he underwent and percutaneous aneurysm sac puncture and endo leak embolization by IR on 5/13/19.  -follow-up with vascular surgery and IR    5) Diabetes, hypertension:  -following with PCP      Breana Perry MD  Hematology/Oncology  [facility]                Again, thank you for allowing me to participate in the care of your patient.        Sincerely,        Breana Perry MD

## 2020-06-05 ENCOUNTER — TELEPHONE (OUTPATIENT)
Dept: ONCOLOGY | Facility: CLINIC | Age: 80
End: 2020-06-05

## 2020-06-05 PROBLEM — D61.9 ANEMIA DUE TO BONE MARROW FAILURE (H): Status: ACTIVE | Noted: 2020-06-05

## 2020-06-05 RX ORDER — HEPARIN SODIUM (PORCINE) LOCK FLUSH IV SOLN 100 UNIT/ML 100 UNIT/ML
5 SOLUTION INTRAVENOUS
Status: CANCELLED | OUTPATIENT
Start: 2020-06-05

## 2020-06-05 RX ORDER — HEPARIN SODIUM,PORCINE 10 UNIT/ML
5 VIAL (ML) INTRAVENOUS
Status: CANCELLED | OUTPATIENT
Start: 2020-06-05

## 2020-06-05 NOTE — TELEPHONE ENCOUNTER
I called Austin today to help him schedule his future appointments ordered 6/4/20 per his AVS.  Austin also said that Dr Perry offered a blood transfusion as per his low hemo lab results.  He said that at the time he said that he didn't want one.  He has changed his mind now and feels that it might be a good idea to get a transfusion before the 4th of July holiday.  Could you please order this and let me know so I can call Austin and schedule.  Thanks,  Melani

## 2020-06-08 ENCOUNTER — PATIENT OUTREACH (OUTPATIENT)
Dept: ONCOLOGY | Facility: CLINIC | Age: 80
End: 2020-06-08

## 2020-06-08 RX ORDER — HEPARIN SODIUM,PORCINE 10 UNIT/ML
5 VIAL (ML) INTRAVENOUS
Status: CANCELLED | OUTPATIENT
Start: 2020-06-08

## 2020-06-08 RX ORDER — HEPARIN SODIUM (PORCINE) LOCK FLUSH IV SOLN 100 UNIT/ML 100 UNIT/ML
5 SOLUTION INTRAVENOUS
Status: CANCELLED | OUTPATIENT
Start: 2020-06-08

## 2020-06-08 NOTE — PATIENT INSTRUCTIONS
Lab appts scheduled 6/18, 7/14  Appt with Dr. Perry scheduled 7/16  Tosha Burris, RN, BSN, [facility]  Patient Care Coordinator  [facility]  [phone]

## 2020-06-08 NOTE — PROGRESS NOTES
"Writer called Austin to follow up on symptoms. Writer was requested by Dr. Perry to see if he would like a blood transfusion.     Austin reports he would like a blood transfusion. He reports he is frequently tired and \"runs out of wind\" when he is doing things.     Austin reports he is able to make it to an appt on Wednesday 6/10 at 2pm for blood transfusion. Writer explained he will need to make a separate appt for a type and screen tomorrow. Austin verbalized understanding. Writer transferred his call to Crisitn .     Tosha Burris, RN, BSN, [facility]  Patient Care Coordinator  [facility]  [phone]      "

## 2020-06-09 ENCOUNTER — HOSPITAL ENCOUNTER (OUTPATIENT)
Facility: CLINIC | Age: 80
Setting detail: SPECIMEN
Discharge: HOME | End: 2020-06-09
Attending: INTERNAL MEDICINE | Admitting: INTERNAL MEDICINE
Payer: COMMERCIAL

## 2020-06-09 ENCOUNTER — PATIENT OUTREACH (OUTPATIENT)
Dept: ONCOLOGY | Facility: CLINIC | Age: 80
End: 2020-06-09

## 2020-06-09 DIAGNOSIS — C91.10 CLL (CHRONIC LYMPHOCYTIC LEUKEMIA) (H): Primary | ICD-10-CM

## 2020-06-09 LAB
ABO + RH BLD: NORMAL
ABO + RH BLD: NORMAL
ANISOCYTOSIS BLD QL SMEAR: ABNORMAL
BASOPHILS # BLD AUTO: 0 10E9/L (ref 0–0.2)
BASOPHILS NFR BLD AUTO: 0 %
BLD GP AB SCN SERPL QL: NORMAL
BLD PROD TYP BPU: NORMAL
BLOOD BANK CMNT PATIENT-IMP: NORMAL
DACRYOCYTES BLD QL SMEAR: SLIGHT
DIFFERENTIAL METHOD BLD: ABNORMAL
EOSINOPHIL # BLD AUTO: 0 10E9/L (ref 0–0.7)
EOSINOPHIL NFR BLD AUTO: 0 %
ERYTHROCYTE [DISTWIDTH] IN BLOOD BY AUTOMATED COUNT: 19.2 % (ref 10–15)
HCT VFR BLD AUTO: 28.1 % (ref 40–53)
HGB BLD-MCNC: 7.8 G/DL (ref 13.3–17.7)
HYPOCHROMIA BLD QL: PRESENT
LYMPHOCYTES # BLD AUTO: 137.7 10E9/L (ref 0.8–5.3)
LYMPHOCYTES NFR BLD AUTO: 98 %
MACROCYTES BLD QL SMEAR: PRESENT
MCH RBC QN AUTO: 32.9 PG (ref 26.5–33)
MCHC RBC AUTO-ENTMCNC: 27.8 G/DL (ref 31.5–36.5)
MCV RBC AUTO: 119 FL (ref 78–100)
MONOCYTES # BLD AUTO: 0 10E9/L (ref 0–1.3)
MONOCYTES NFR BLD AUTO: 0 %
NEUTROPHILS # BLD AUTO: 2.8 10E9/L (ref 1.6–8.3)
NEUTROPHILS NFR BLD AUTO: 2 %
NUM BPU REQUESTED: 1
OVALOCYTES BLD QL SMEAR: SLIGHT
PLATELET # BLD AUTO: 103 10E9/L (ref 150–450)
PLATELET # BLD EST: ABNORMAL 10*3/UL
POIKILOCYTOSIS BLD QL SMEAR: SLIGHT
RBC # BLD AUTO: 2.37 10E12/L (ref 4.4–5.9)
RBC INCLUSIONS BLD: SLIGHT
SPECIMEN EXP DATE BLD: NORMAL
VARIANT LYMPHS BLD QL SMEAR: PRESENT
WBC # BLD AUTO: 140.5 10E9/L (ref 4–11)

## 2020-06-09 PROCEDURE — 86901 BLOOD TYPING SEROLOGIC RH(D): CPT | Performed by: INTERNAL MEDICINE

## 2020-06-09 PROCEDURE — 86900 BLOOD TYPING SEROLOGIC ABO: CPT | Performed by: INTERNAL MEDICINE

## 2020-06-09 PROCEDURE — 85025 COMPLETE CBC W/AUTO DIFF WBC: CPT | Performed by: INTERNAL MEDICINE

## 2020-06-09 PROCEDURE — 86923 COMPATIBILITY TEST ELECTRIC: CPT | Performed by: INTERNAL MEDICINE

## 2020-06-09 PROCEDURE — 36415 COLL VENOUS BLD VENIPUNCTURE: CPT

## 2020-06-09 PROCEDURE — 86850 RBC ANTIBODY SCREEN: CPT | Performed by: INTERNAL MEDICINE

## 2020-06-09 PROCEDURE — P9016 RBC LEUKOCYTES REDUCED: HCPCS

## 2020-06-09 RX ORDER — HEPARIN SODIUM (PORCINE) LOCK FLUSH IV SOLN 100 UNIT/ML 100 UNIT/ML
5 SOLUTION INTRAVENOUS
Status: CANCELLED | OUTPATIENT
Start: 2020-06-09

## 2020-06-09 RX ORDER — HEPARIN SODIUM,PORCINE 10 UNIT/ML
5 VIAL (ML) INTRAVENOUS
Status: CANCELLED | OUTPATIENT
Start: 2020-06-09

## 2020-06-09 NOTE — PROGRESS NOTES
Per Dr Perry, this is a normal value for pt. No action needed at this time.  Alysa Campos, RN, BSN, OCN, CBCN

## 2020-06-09 NOTE — PROGRESS NOTES
Received a call from Jaye at [facility] with critical lab value.    WBC = 140.5    Will route to Dr Perry for review and advice.  Alysa Campos, RN, BSN, OCN, CBCN

## 2020-06-09 NOTE — PROGRESS NOTES
Medical Assistant Note:  Austin CHEEMA Greg presents today for blood draw.    Patient seen by provider today: No.   present during visit today: Not Applicable.    Concerns: No Concerns.    Procedure:  Lab draw site: right antecub, Needle type: butterfly, Gauge: 23.    Post Assessment:  Labs drawn without difficulty: Yes.    Discharge Plan:  Departure Mode: Ambulatory.    Face to Face Time: 10.    Geetha Benítez, CMA

## 2020-06-10 ENCOUNTER — DOCUMENTATION ONLY (OUTPATIENT)
Dept: ONCOLOGY | Facility: CLINIC | Age: 80
End: 2020-06-10

## 2020-06-10 ENCOUNTER — INFUSION THERAPY VISIT (OUTPATIENT)
Dept: INFUSION THERAPY | Facility: CLINIC | Age: 80
End: 2020-06-10
Attending: INTERNAL MEDICINE
Payer: COMMERCIAL

## 2020-06-10 VITALS
RESPIRATION RATE: 16 BRPM | DIASTOLIC BLOOD PRESSURE: 77 MMHG | TEMPERATURE: 97.4 F | HEART RATE: 82 BPM | SYSTOLIC BLOOD PRESSURE: 137 MMHG | OXYGEN SATURATION: 98 %

## 2020-06-10 DIAGNOSIS — D64.9 ANEMIA, UNSPECIFIED TYPE: ICD-10-CM

## 2020-06-10 DIAGNOSIS — C91.10 CLL (CHRONIC LYMPHOCYTIC LEUKEMIA) (H): Primary | ICD-10-CM

## 2020-06-10 PROCEDURE — 36430 TRANSFUSION BLD/BLD COMPNT: CPT

## 2020-06-10 PROCEDURE — P9016 RBC LEUKOCYTES REDUCED: HCPCS

## 2020-06-10 NOTE — PROGRESS NOTES
Oral Chemotherapy Monitoring Program  Primary Oncologist: Dr. Perry  Primary Oncology Clinic: [facility] Diagnosis: CLL     Drug: ibrutinib 420 mg PO daily  Start Date: 5/04/2020 5/28-6/4/2020 Held for tooth extraction  Dose is appropriate for patients: renal/ Hepatic Function        Expected duration of therapy: Until disease progression or unacceptable toxicity        Lab Monitoring Plan      C1D1+   CBC, CMP C2D1+ Call, CBC, CMP C3D1+ Call, CBC, CMP C4D1+ Call, CBC, CMP C5D1+ Call, CBC, CMP C6D1+ Call, CBC, CMP   C1D8+   C2D8+   C3D8+   C4D8+   C5D8+   C6D8+     C1D15+ Call C2D15+   C3D15+   C4D15+   C5D15+   C6D15+     C1D22+   C2D22+   C3D22+   C4D22+   C5D22+   C6D22+     CBC monthly  CMP monthly      Subjective/Objective:  Austin Garza is a 79 year old male seen in clinic for a follow-up visit for oral chemotherapy.  Austin states he is doing well. He noticed some bruising on his arms that resulted after working on a tractor. The bruises have not gone away. He notices fatigue as well though this is likely due to his low Hb. He received blood today.     ORAL CHEMOTHERAPY 4/28/2020 5/8/2020 5/21/2020 6/10/2020   Drug Name Imbruvica (Ibrutinib) Imbruvica (Ibrutinib) Imbruvica (Ibrutinib) Imbruvica (Ibrutinib)   Current Dosage 420mg 420mg 420mg 420mg   Current Schedule Daily Daily Daily Daily   Cycle Details Continuous Continuous Continuous Continuous   Start Date of Last Cycle - 5/4/2020 - -   Planned next cycle start date - 6/1/2020 - -   Doses missed in last 2 weeks - 0 0 more   Adherence Assessment - Adherent Adherent Non-adherent   Reason for Non-adherence - - - Drug on hold   Adherence Intervention Recommended - - - None   Adverse Effects - Diarrhea Diarrhea Fatigue;Other (see note for details)   Diarrhea - Grade 1 Grade 1 -   Pharmacist Intervention(diarrhea) - Yes Yes -   Intervention(s) - Patient education Patient education -   Fatigue - - - Grade 1   Pharmacist Intervention(fatigue) - - - No  "  Other (see note for details) - - - bruising   Pharmacist intervention? - - - No   Any new drug interactions? Yes No No No   Pharmacist Intervention? Yes - - -   Intervention(s) Patient Education - - -   Is the dose as ordered appropriate for the patient? Yes Yes Yes -   Is the patient currently in pain? No No No No   Has the patient been assessed within the past 6 months for depression? Yes Yes No Yes   Has the patient missed any days of school, work, or other routine activity? No No No -       Vitals:  BP:   BP Readings from Last 1 Encounters:   06/10/20 137/77     Wt Readings from Last 1 Encounters:   01/23/20 94.3 kg (208 lb)     Estimated body surface area is 2.14 meters squared as calculated from the following:    Height as of 1/23/20: 1.753 m (5' 9\").    Weight as of 1/23/20: 94.3 kg (208 lb).    Labs:  _  Result Component Current Result Ref Range   Sodium 138 (6/1/2020) 133 - 144 mmol/L     _  Result Component Current Result Ref Range   Potassium 4.4 (6/1/2020) 3.4 - 5.3 mmol/L     _  Result Component Current Result Ref Range   Calcium 8.6 (6/1/2020) 8.5 - 10.1 mg/dL   _  Result Component Current Result Ref Range   Albumin 3.8 (6/1/2020) 3.4 - 5.0 g/dL     _  Result Component Current Result Ref Range   Urea Nitrogen 40 (H) (6/1/2020) 7 - 30 mg/dL     _  Result Component Current Result Ref Range   Creatinine 1.51 (H) (6/1/2020) 0.66 - 1.25 mg/dL       _  Result Component Current Result Ref Range   AST 31 (6/1/2020) 0 - 45 U/L     _  Result Component Current Result Ref Range   ALT 19 (6/1/2020) 0 - 70 U/L     _  Result Component Current Result Ref Range   Bilirubin Total 0.5 (6/1/2020) 0.2 - 1.3 mg/dL       _  Result Component Current Result Ref Range   .5 (HH) (6/9/2020) 4.0 - 11.0 10e9/L     _  Result Component Current Result Ref Range   Hemoglobin 7.8 (L) (6/9/2020) 13.3 - 17.7 g/dL     _  Result Component Current Result Ref Range   Platelet Count 103 (L) (6/9/2020) 150 - 450 10e9/L     _  Result " Component Current Result Ref Range   Absolute Neutrophil 2.8 (6/9/2020) 1.6 - 8.3 10e9/L       Assessment:  Austin is tolerating therapy with mild side effects. His labs from 6/9 are ok.     Plan:  Continue ibrutinib 420 mg daily    Follow-Up:  Labs on 6/18.    Refill Due:  6/19 [facility]    Thea Brambila, Pharm. D., BCOP

## 2020-06-10 NOTE — PROGRESS NOTES
Infusion Nursing Note:  Austin Garza presents today for 1 unit PRBC.    Patient seen by provider today: No   present during visit today: Not Applicable.    Note: N/A.    Intravenous Access:  Peripheral IV placed.    Treatment Conditions:  Lab Results   Component Value Date    HGB 7.8 06/09/2020     Blood transfusion consent signed 6/19/19.    Post Infusion Assessment:  Patient tolerated infusion without incident.  Blood return noted pre and post infusion.  Site patent and intact, free from redness, edema or discomfort.  No evidence of extravasations.  Access discontinued per protocol.     Discharge Plan:   Discharge instructions reviewed with: Patient.  Patient and/or family verbalized understanding of discharge instructions and all questions answered.  AVS to patient via [employer].  Patient will return 6/18 for MA labs for next appointment.   Patient discharged in stable condition accompanied by: wife.  Departure Mode: Ambulatory.    Coby Oliver RN

## 2020-06-10 NOTE — PATIENT INSTRUCTIONS
Patient Education     After Your Blood Transfusion  Discharge Instructions  After you leave  After a blood transfusion (received red blood cells, platelets, plasma, cryo or granulocytes), you will need to watch for signs of a reaction for the next 48 hours.  Call your clinic or 911 (or go to the Emergency room) if you have any signs of a reaction:    Shaking or chills    Fever (temperature above 100.0 F)    Headache    Nausea    Hives    Itching    Swelling of the face or feeling flushed    Ongoing dry cough (nothing is coughed up)    Trouble breathing or wheezing  Some signs of a reaction won't show up for a few [id number].  These may include:    Fatigue    Dizziness    Pink or red urine  Your clinic is:   ________________________________________  ________________________________________  For informational purposes only. Not to replace the advice of your health care provider.   Copyright   2015 [employer]. All rights reserved. [employer] 553755 - Rev 07/16.  For informational purposes only. Not to replace the advice of your health care provider.  Copyright   2018 [employer]. All rights reserved.

## 2020-06-18 ENCOUNTER — HOSPITAL ENCOUNTER (OUTPATIENT)
Facility: CLINIC | Age: 80
Setting detail: SPECIMEN
Discharge: HOME | End: 2020-06-18
Attending: INTERNAL MEDICINE | Admitting: INTERNAL MEDICINE
Payer: COMMERCIAL

## 2020-06-18 ENCOUNTER — PATIENT OUTREACH (OUTPATIENT)
Dept: ONCOLOGY | Facility: CLINIC | Age: 80
End: 2020-06-18

## 2020-06-18 DIAGNOSIS — C91.10 CLL (CHRONIC LYMPHOCYTIC LEUKEMIA) (H): ICD-10-CM

## 2020-06-18 LAB
ALBUMIN SERPL-MCNC: 3.9 G/DL (ref 3.4–5)
ALP SERPL-CCNC: 39 U/L (ref 40–150)
ALT SERPL W P-5'-P-CCNC: 18 U/L (ref 0–70)
ANION GAP SERPL CALCULATED.3IONS-SCNC: 6 MMOL/L (ref 3–14)
ANISOCYTOSIS BLD QL SMEAR: ABNORMAL
AST SERPL W P-5'-P-CCNC: 16 U/L (ref 0–45)
BASOPHILS # BLD AUTO: 0 10E9/L (ref 0–0.2)
BASOPHILS NFR BLD AUTO: 0 %
BILIRUB SERPL-MCNC: 0.8 MG/DL (ref 0.2–1.3)
BUN SERPL-MCNC: 34 MG/DL (ref 7–30)
CALCIUM SERPL-MCNC: 8.7 MG/DL (ref 8.5–10.1)
CHLORIDE SERPL-SCNC: 104 MMOL/L (ref 94–109)
CO2 SERPL-SCNC: 27 MMOL/L (ref 20–32)
CREAT SERPL-MCNC: 1.56 MG/DL (ref 0.66–1.25)
DACRYOCYTES BLD QL SMEAR: SLIGHT
DIFFERENTIAL METHOD BLD: ABNORMAL
EOSINOPHIL # BLD AUTO: 0 10E9/L (ref 0–0.7)
EOSINOPHIL NFR BLD AUTO: 0 %
ERYTHROCYTE [DISTWIDTH] IN BLOOD BY AUTOMATED COUNT: 20.8 % (ref 10–15)
GFR SERPL CREATININE-BSD FRML MDRD: 42 ML/MIN/{1.73_M2}
GLUCOSE SERPL-MCNC: 184 MG/DL (ref 70–99)
HCT VFR BLD AUTO: 29.4 % (ref 40–53)
HGB BLD-MCNC: 8.5 G/DL (ref 13.3–17.7)
HYPOCHROMIA BLD QL: PRESENT
LDH SERPL L TO P-CCNC: 190 U/L (ref 85–227)
LYMPHOCYTES # BLD AUTO: 130.8 10E9/L (ref 0.8–5.3)
LYMPHOCYTES NFR BLD AUTO: 98 %
MACROCYTES BLD QL SMEAR: PRESENT
MCH RBC QN AUTO: 32.7 PG (ref 26.5–33)
MCHC RBC AUTO-ENTMCNC: 28.9 G/DL (ref 31.5–36.5)
MCV RBC AUTO: 113 FL (ref 78–100)
MONOCYTES # BLD AUTO: 0 10E9/L (ref 0–1.3)
MONOCYTES NFR BLD AUTO: 0 %
NEUTROPHILS # BLD AUTO: 2.7 10E9/L (ref 1.6–8.3)
NEUTROPHILS NFR BLD AUTO: 2 %
OVALOCYTES BLD QL SMEAR: SLIGHT
PLATELET # BLD AUTO: 106 10E9/L (ref 150–450)
PLATELET # BLD EST: ABNORMAL 10*3/UL
POIKILOCYTOSIS BLD QL SMEAR: SLIGHT
POTASSIUM SERPL-SCNC: 4.5 MMOL/L (ref 3.4–5.3)
PROT SERPL-MCNC: 6.9 G/DL (ref 6.8–8.8)
RBC # BLD AUTO: 2.6 10E12/L (ref 4.4–5.9)
RBC INCLUSIONS BLD: SLIGHT
SODIUM SERPL-SCNC: 137 MMOL/L (ref 133–144)
VARIANT LYMPHS BLD QL SMEAR: PRESENT
WBC # BLD AUTO: 133.5 10E9/L (ref 4–11)

## 2020-06-18 PROCEDURE — 80053 COMPREHEN METABOLIC PANEL: CPT | Performed by: INTERNAL MEDICINE

## 2020-06-18 PROCEDURE — 36415 COLL VENOUS BLD VENIPUNCTURE: CPT

## 2020-06-18 PROCEDURE — 83615 LACTATE (LD) (LDH) ENZYME: CPT | Performed by: INTERNAL MEDICINE

## 2020-06-18 PROCEDURE — 85025 COMPLETE CBC W/AUTO DIFF WBC: CPT | Performed by: INTERNAL MEDICINE

## 2020-06-18 NOTE — PROGRESS NOTES
Received a call from [facility]     Critical .5    Message routed to Dr. Perry for review  Nicci Hackett RN on 6/18/2020 at 10:36 AM

## 2020-06-18 NOTE — PROGRESS NOTES
Medical Assistant Note:  Austin Garza presents today for lab draw.    Patient seen by provider today: No.   present during visit today: Not Applicable.    Concerns: No Concerns.    Procedure:  Labs drawn: .    Post Assessment:  Labs drawn without difficulty: Yes.    Discharge Plan:  Departure Mode: Ambulatory.    Face to Face Time: 10.    Ita Pinto, [facility]

## 2020-06-19 DIAGNOSIS — C91.10 CLL (CHRONIC LYMPHOCYTIC LEUKEMIA) (H): Primary | ICD-10-CM

## 2020-06-22 DIAGNOSIS — E11.9 TYPE 2 DIABETES MELLITUS WITHOUT COMPLICATION, WITHOUT LONG-TERM CURRENT USE OF INSULIN (H): Primary | ICD-10-CM

## 2020-06-22 NOTE — TELEPHONE ENCOUNTER
Medication Question or Refill  Who is calling: Patient is calling requesting a new glucose meter. He is requesting the Contour Next Monitor. He already picked up the strips that were sent in by Dr Eastman. He said that insurance will not cover test strips for his old glucose meter so he needs a new meter. He is requesting this as soon as possible since he does not have a way to check his blood sugar levels right now. Dr Eastman changed his blood sugar medication and she wants him to check his levels 2 x's a day.  What medication are you calling about (include dose and sig)?: Contour next monitor  Controlled Substance Agreement on file: No  Who prescribed the medication?: Dr Eastman  Do you need a refill? No  When did you use the medication last?   Patient offered an appointment? No  Do you have any questions or concerns?  No  Requested Pharmacy: [facility] in [place] on [facility] [facility] [address]  Okay to leave a detailed message?: No at Cell number on file:    Telephone Information:   Mobile [phone]

## 2020-06-23 NOTE — TELEPHONE ENCOUNTER
Routing refill request to provider for review/approval because:  Please sign if appropriate as new rX     Courtney Weaver RN

## 2020-07-02 ENCOUNTER — TELEPHONE (OUTPATIENT)
Dept: PEDIATRICS | Facility: CLINIC | Age: 80
End: 2020-07-02

## 2020-07-02 NOTE — TELEPHONE ENCOUNTER
Can we have him increase glimepiride to 8mg daily? He will need to watch closely for low blood sugars. SB3 PAL can follow-up in 2-3 weeks for blood sugar updates.    Thanks!  Sandi Eastman MD  Internal Medicine-Pediatrics

## 2020-07-02 NOTE — TELEPHONE ENCOUNTER
Called and discussed plan with pt. He agrees will call as needed. Will postpone for 2 weeks for update.Nicole Lion RN on 7/2/2020 at 4:49 PM

## 2020-07-02 NOTE — TELEPHONE ENCOUNTER
Spoke to pt.     Month recheck on blood sugars since he stopped metformin and increased glipizide.  Between  fasting. Before lunch sometimes it's under 110. Around 430-5 below 110. If he checks any other time of day it is higher, high 100's or 200 and that can still be 2 hours after eating.     He also reports that he stopped taking metformin and the next day the diarrhea disappeared. Has only had one episode since then.     Nicole Lion RN on 7/2/2020 at 2:19 PM

## 2020-07-13 ENCOUNTER — TELEPHONE (OUTPATIENT)
Dept: PEDIATRICS | Facility: CLINIC | Age: 80
End: 2020-07-13

## 2020-07-13 DIAGNOSIS — E11.9 TYPE 2 DIABETES MELLITUS WITHOUT COMPLICATION, WITHOUT LONG-TERM CURRENT USE OF INSULIN (H): Primary | ICD-10-CM

## 2020-07-13 NOTE — TELEPHONE ENCOUNTER
Called pt to let him know. He agrees with the plan. No further questions.Nicole Lion RN on 7/13/2020 at 4:35 PM

## 2020-07-13 NOTE — TELEPHONE ENCOUNTER
Hemoglobin A1c added on for tomorrows labs, although this likely will not be indicative of recent blood sugar control given recent changes.    Agree that he should stay at glimepiride 4mg daily at this time and plan to recheck A1c in 3-6 months.     Sandi Eastman MD  Internal Medicine-Pediatrics

## 2020-07-13 NOTE — TELEPHONE ENCOUNTER
General Call:   Who is calling:  Pt  Reason for Call:  Calling with a med update   What are your questions or concerns:  Pt had problems with changes  Date of last appointment with provider: n/a  Okay to leave a detailed message:Yes at Home number on file [phone] (home)

## 2020-07-13 NOTE — TELEPHONE ENCOUNTER
He increased his glimepiride as directed to 8mg daily. He had been taking 4 mg in morning and 4 in evening. Was getting readings in 40s. Started back to 4 mg in the am. They have been more stable now.     He has a blood draw tomorrow. Do you think he should have an a1c?     Do you think he should have an appointment with you to discuss? Nicole Lion RN on 7/13/2020 at 3:27 PM

## 2020-07-14 ENCOUNTER — HOSPITAL ENCOUNTER (OUTPATIENT)
Facility: CLINIC | Age: 80
Setting detail: SPECIMEN
Discharge: HOME | End: 2020-07-14
Attending: INTERNAL MEDICINE | Admitting: INTERNAL MEDICINE
Payer: COMMERCIAL

## 2020-07-14 ENCOUNTER — PATIENT OUTREACH (OUTPATIENT)
Dept: ONCOLOGY | Facility: CLINIC | Age: 80
End: 2020-07-14

## 2020-07-14 DIAGNOSIS — C91.10 CLL (CHRONIC LYMPHOCYTIC LEUKEMIA) (H): ICD-10-CM

## 2020-07-14 DIAGNOSIS — E11.9 TYPE 2 DIABETES MELLITUS WITHOUT COMPLICATION, WITHOUT LONG-TERM CURRENT USE OF INSULIN (H): ICD-10-CM

## 2020-07-14 LAB
ALBUMIN SERPL-MCNC: 3.8 G/DL (ref 3.4–5)
ALP SERPL-CCNC: 40 U/L (ref 40–150)
ALT SERPL W P-5'-P-CCNC: 21 U/L (ref 0–70)
ANION GAP SERPL CALCULATED.3IONS-SCNC: 6 MMOL/L (ref 3–14)
ANISOCYTOSIS BLD QL SMEAR: ABNORMAL
AST SERPL W P-5'-P-CCNC: 17 U/L (ref 0–45)
BASOPHILS # BLD AUTO: 0 10E9/L (ref 0–0.2)
BASOPHILS NFR BLD AUTO: 0 %
BILIRUB SERPL-MCNC: 0.9 MG/DL (ref 0.2–1.3)
BUN SERPL-MCNC: 31 MG/DL (ref 7–30)
CALCIUM SERPL-MCNC: 8 MG/DL (ref 8.5–10.1)
CHLORIDE SERPL-SCNC: 108 MMOL/L (ref 94–109)
CO2 SERPL-SCNC: 24 MMOL/L (ref 20–32)
CREAT SERPL-MCNC: 1.61 MG/DL (ref 0.66–1.25)
DIFFERENTIAL METHOD BLD: ABNORMAL
EOSINOPHIL # BLD AUTO: 0 10E9/L (ref 0–0.7)
EOSINOPHIL NFR BLD AUTO: 0 %
ERYTHROCYTE [DISTWIDTH] IN BLOOD BY AUTOMATED COUNT: 19.8 % (ref 10–15)
GFR SERPL CREATININE-BSD FRML MDRD: 40 ML/MIN/{1.73_M2}
GLUCOSE SERPL-MCNC: 132 MG/DL (ref 70–99)
HBA1C MFR BLD: 5.5 % (ref 0–5.6)
HCT VFR BLD AUTO: 30.5 % (ref 40–53)
HGB BLD-MCNC: 8.6 G/DL (ref 13.3–17.7)
HYPOCHROMIA BLD QL: PRESENT
LDH SERPL L TO P-CCNC: 190 U/L (ref 85–227)
LYMPHOCYTES # BLD AUTO: 141.2 10E9/L (ref 0.8–5.3)
LYMPHOCYTES NFR BLD AUTO: 99 %
MACROCYTES BLD QL SMEAR: PRESENT
MCH RBC QN AUTO: 32.1 PG (ref 26.5–33)
MCHC RBC AUTO-ENTMCNC: 28.2 G/DL (ref 31.5–36.5)
MCV RBC AUTO: 114 FL (ref 78–100)
MONOCYTES # BLD AUTO: 0 10E9/L (ref 0–1.3)
MONOCYTES NFR BLD AUTO: 0 %
NEUTROPHILS # BLD AUTO: 1.4 10E9/L (ref 1.6–8.3)
NEUTROPHILS NFR BLD AUTO: 1 %
OVALOCYTES BLD QL SMEAR: SLIGHT
PLATELET # BLD AUTO: 119 10E9/L (ref 150–450)
PLATELET # BLD EST: ABNORMAL 10*3/UL
POIKILOCYTOSIS BLD QL SMEAR: SLIGHT
POTASSIUM SERPL-SCNC: 4.3 MMOL/L (ref 3.4–5.3)
PROT SERPL-MCNC: 6.9 G/DL (ref 6.8–8.8)
RBC # BLD AUTO: 2.68 10E12/L (ref 4.4–5.9)
RBC INCLUSIONS BLD: SLIGHT
SODIUM SERPL-SCNC: 138 MMOL/L (ref 133–144)
VARIANT LYMPHS BLD QL SMEAR: PRESENT
WBC # BLD AUTO: 142.6 10E9/L (ref 4–11)

## 2020-07-14 PROCEDURE — 80053 COMPREHEN METABOLIC PANEL: CPT | Performed by: INTERNAL MEDICINE

## 2020-07-14 PROCEDURE — 83036 HEMOGLOBIN GLYCOSYLATED A1C: CPT | Performed by: INTERNAL MEDICINE

## 2020-07-14 PROCEDURE — 85025 COMPLETE CBC W/AUTO DIFF WBC: CPT | Performed by: INTERNAL MEDICINE

## 2020-07-14 PROCEDURE — 83615 LACTATE (LD) (LDH) ENZYME: CPT | Performed by: INTERNAL MEDICINE

## 2020-07-14 PROCEDURE — 36415 COLL VENOUS BLD VENIPUNCTURE: CPT

## 2020-07-14 NOTE — PROGRESS NOTES
Writer received a critical WBC count from [facility] lab on Austin's WBC from today. It is 142.6.     Writer will notify the provider, Dr. Perry.     Tosha Burris RN, BSN, [facility]  Patient Care Coordinator  [facility]  [phone]

## 2020-07-16 ENCOUNTER — VIRTUAL VISIT (OUTPATIENT)
Dept: ONCOLOGY | Facility: CLINIC | Age: 80
End: 2020-07-16
Attending: INTERNAL MEDICINE
Payer: COMMERCIAL

## 2020-07-16 ENCOUNTER — MYC MEDICAL ADVICE (OUTPATIENT)
Dept: PEDIATRICS | Facility: CLINIC | Age: 80
End: 2020-07-16

## 2020-07-16 ENCOUNTER — TRANSFERRED RECORDS (OUTPATIENT)
Dept: HEALTH INFORMATION MANAGEMENT | Facility: CLINIC | Age: 80
End: 2020-07-16

## 2020-07-16 DIAGNOSIS — C91.10 CLL (CHRONIC LYMPHOCYTIC LEUKEMIA) (H): Primary | ICD-10-CM

## 2020-07-16 PROCEDURE — 99214 OFFICE O/P EST MOD 30 MIN: CPT | Mod: 95 | Performed by: INTERNAL MEDICINE

## 2020-07-16 PROCEDURE — 40001009 ZZH VIDEO/TELEPHONE VISIT; NO CHARGE

## 2020-07-16 NOTE — PROGRESS NOTES
"Austin Garza is a 79 year old male who is being evaluated via a billable video visit.      The patient has been notified of following:     \"This video visit will be conducted via a call between you and your physician/provider. We have found that certain health care needs can be provided without the need for an in-person physical exam.  This service lets us provide the care you need with a video conversation.  If a prescription is necessary we can send it directly to your pharmacy.  If lab work is needed we can place an order for that and you can then stop by our lab to have the test done at a later time.    Video visits are billed at different rates depending on your insurance coverage.  Please reach out to your insurance provider with any questions.    If during the course of the call the physician/provider feels a video visit is not appropriate, you will not be charged for this service.\"    Patient has given verbal consent for Video visit? Yes  How would you like to obtain your AVS? MyChart  If you are dropped from the video visit, the video invite should be resent to: MY Chart video  Will anyone else be joining your video visit? No      Video-Visit Details    Type of service:  Video Visit    Video Start Time: 3:45 pm  Video End Time: 3:55 PM    Originating Location (pt. Location): Home    Distant Location (provider location):  [facility]     Platform used for Video Visit: [facility]    Hematology/Oncology Established Patient Note      Today's Date: 7/16/20    Reason for Follow-up: CLL      HISTORY OF PRESENT ILLNESS: Austin Garza is a 79 year old male with PMHx of DMII, HTN who presented with leukocytosis.  In November 2017, he was found to have elevated WBC of 61.7K, hemoglobin of 10.4, and platelet of 115K.  There were no other CBC results available between 2010 and 2017.  Prior to 2010, he had normal CBC, with normal to mild anemia, and mild " thrombocytopenia.   He was asymptomatic.    He underwent bone marrow biopsy on 11/21/17, which was consistent with chronic lymphocytic leukemia/small lymphocytic lymphoma, with 13% ringed sideroblasts identified.  The presence of increased numbers of ringed sideroblasts raises the possibility of an associated myelodysplastic syndrome.  Dysplastic changes are not identified though.  Many cases exhibiting ringed sideroblasts are metabolic origin, without significant risk of progression to acute leukemia, and these typically do not show dysplastic morphology as is the case with this specimen.  15% ringed sideroblasts are required for a diagnosis for RARS, which this specimen does not meet.  Flow cytometry is also consistent with CLL/SLL.  Cytogenetics show two (10%) of the metaphase cells comprise a clone characterized by a deletion within the proximal long arm of a chromosome 13 as the sole karyotypic abnormality.  Three (15%) metaphases had loss of the Y chromosome as the sole abnormality.    CT scan on 11/29/17 shows mildly enlarged left axillary lymph node and periaortic lymph nodes in the retroperitoneum of the abdomen.  Spleen is also enlarged.    On his staging CT scan for CLL, he was incidentally found to have a large infrarenal abdominal aortic aneurysm, measuring 7.6 cm.  He was seen by Dr. Thomas, and he underwent EVAR on 12/4/17.     He started ibrutinib on 5/4/20.  It was held 5/28/20-6/4/20 for tooth extraction.      INTERIM HISTORY: Austin says that he feels well.  He has resumed ibrutinib for the past month.  He denies significant side effects with it.  He previously has diarrhea, that he thought was due to ibrutinib, but he stopped metformin and the diarrhea stopped the next day.        REVIEW OF SYSTEMS:   14 point ROS was reviewed and is negative other than as noted above in HPI.       HOME MEDICATIONS:  Current Outpatient Medications   Medication Sig Dispense Refill     aspirin (ASA) 81 MG tablet  Take 1 tablet (81 mg) by mouth every evening (Patient taking differently: Take 325 mg by mouth every evening ) 90 tablet 3     atorvastatin (LIPITOR) 20 MG tablet Take 1 tablet (20 mg) by mouth At Bedtime 90 tablet 3     blood glucose (NO BRAND SPECIFIED) lancets standard Use to test blood sugar 1-2 times daily or as directed.  Dispense brand covered by insurance/Contour next per pt 100 each 1     blood glucose (NO BRAND SPECIFIED) test strip Use to test blood sugar 1-2 times daily or as directed (adjusting oral medications). 100 each 1     blood glucose monitoring (NO BRAND SPECIFIED) meter device kit Use to test blood sugar 1-2 times daily or as directed. Dispense brand as coved by insurance/Contour next per pt 1 kit 0     clopidogrel (PLAVIX) 75 MG tablet TAKE 1 TABLET BY MOUTH DAILY 90 tablet 1     glimepiride (AMARYL) 4 MG tablet Take 1 tablet (4 mg) by mouth every morning (before breakfast) 90 tablet 1     ibrutinib (IMBRUVICA) 420 MG tablet Take 1 tablet (420 mg) by mouth daily 28 tablet 0     ibrutinib (IMBRUVICA) 420 MG tablet Take 1 tablet (420 mg) by mouth daily 28 tablet 0     ibrutinib (IMBRUVICA) 420 MG tablet Take 1 tablet (420 mg) by mouth daily 28 tablet 0     losartan-hydrochlorothiazide (HYZAAR) 100-25 MG tablet Take 1 tablet by mouth daily 90 tablet 3     Multiple Vitamins-Minerals (CENTRUM SILVER) per tablet Take 1 tablet by mouth daily           ALLERGIES:  Allergies   Allergen Reactions     Ace Inhibitors      cough         PAST MEDICAL HISTORY:  Past Medical History:   Diagnosis Date     AAA (abdominal aortic aneurysm)      BCC (basal cell carcinoma of skin) - face      CLL (chronic lymphocytic leukemia)      Diaphragmatic hernia      Diverticulitis of colon      Esophageal reflux      Essential hypertension      Hyperlipidemia      Irritable bowel syndrome      Macular degeneration (senile) of retina      Squamous Cell Carcinoma, Back 1988     Type 2 diabetes mellitus          PAST SURGICAL  HISTORY:  Past Surgical History:   Procedure Laterality Date     APPENDECTOMY OPEN  1966     BONE MARROW BIOPSY, BONE SPECIMEN, NEEDLE/TROCAR N/A 2017    Procedure: BIOPSY BONE MARROW;  BONE MARROW BIOPSY;  Surgeon: Shady Garcia MD;  Location:  GI     COLONOSCOPY       ENDOVASCULAR REPAIR ANEURYSM ABDOMINAL AORTA N/A 2017    Procedure: ENDOVASCULAR REPAIR ANEURYSM ABDOMINAL AORTA;  ENDOVASCULAR REPAIR ANEURYSM ABDOMINAL AORTA;  Surgeon: Milton Cazares MD;  Location:  OR     Fistulotomy with marsupialization for repair of fisture in ano       IR ABDOMINAL AORTOGRAM  3/11/2019     IR VISCERAL EMBOLIZATION  2019     Multiple skin tags - resection       Squamous cell skin cancer resection - back           SOCIAL HISTORY:  Social History     Socioeconomic History     Marital status:      Spouse name: librado     Number of children: 0     Years of education: 17     Highest education level: Not on file   Occupational History     Occupation: retired   Social Needs     Financial resource strain: Not on file     Food insecurity     Worry: Not on file     Inability: Not on file     Transportation needs     Medical: Not on file     Non-medical: Not on file   Tobacco Use     Smoking status: Former Smoker     Packs/day: 0.50     Years: 20.00     Pack years: 10.00     Last attempt to quit: 1975     Years since quittin.5     Smokeless tobacco: Former User   Substance and Sexual Activity     Alcohol use: Yes     Alcohol/week: 10.0 - 14.0 standard drinks     Types: 10 - 14 Standard drinks or equivalent per week     Comment: 2 drinks a day/wine     Drug use: No     Sexual activity: Never   Lifestyle     Physical activity     Days per week: Not on file     Minutes per session: Not on file     Stress: Not on file   Relationships     Social connections     Talks on phone: Not on file     Gets together: Not on file     Attends [employer] service: Not on file     Active  member of club or organization: Not on file     Attends meetings of clubs or organizations: Not on file     Relationship status: Not on file     Intimate partner violence     Fear of current or ex partner: Not on file     Emotionally abused: Not on file     Physically abused: Not on file     Forced sexual activity: Not on file   Other Topics Concern     Parent/sibling w/ CABG, MI or angioplasty before 65F 55M? Not Asked   Social History Narrative     Not on file   He quit smoking in .  He drinks 1-3 glasses of wine a night with dinner.  He is retired, and used to work as a .  He lives with his wife in [place].  His cousin had lung cancer and  around age 69.  Otherwise, he denies family history of cancer.        FAMILY HISTORY:  Family History   Problem Relation Age of Onset     Cerebrovascular Disease Father         x 2     Hypertension Mother      C.A.D. Mother      Cancer Mother         skin     Eye Disorder Mother         glaucoma     Prostate Cancer No family hx of      Cancer - colorectal No family hx of      Glaucoma No family hx of      Macular Degeneration No family hx of          PHYSICAL EXAM:  Vital signs:  There were no vitals taken for this visit.   ECO  GENERAL/CONSTITUTIONAL: No acute distress. Healthy, alert.  EYES: No scleral icterus.  No redness or discharge.    RESPIRATORY: No audible wheeze, cough, or visible cyanosis.  No visible retractions or increased work of breathing.  Able to speak fully in complete sentences.  MUSCULOSKELETAL: Normal range of motion.  NEUROLOGIC: Alert, oriented, answers questions appropriately. No tremor. Mentation intact and speech normal  INTEGUMENTARY: No jaundice.  No obvious rash or skin lesions.  PSYCHIATRIC:  Mentation appears normal, affect normal/bright, judgement and insight intact, normal speech and appearance well-groomed.    The rest of a comprehensive physical exam is deferred due to public health emergency video visit  restrictions.        LABS:  CBC RESULTS:   Recent Labs   Lab Test 07/14/20  1002   .6*   RBC 2.68*   HGB 8.6*   HCT 30.5*   *   MCH 32.1   MCHC 28.2*   RDW 19.8*   *         ASSESSMENT/PLAN:  Austin Garza is a 79 year old male with:    1) CLL/SLL: BLACKWOOD stage III, with lymphocytosis, lymphadenopathy, splenomegaly, and anemia.  He has mild thrombocytopenia as well, but is above 100K.  He presented with leukocytosis with WBC of 61.7K, hemoglobin of 10.4, and platelet count of 115K on diagnosis.  Bone marrow biopsy and flow cytometry confirmed CLL/SLL.  CT scan shows mildly enlarged left axillary lymph node and periaortic lymph nodes in the retroperitoneum of the abdomen, with enlarged spleen.      Due to worsening lymphocyte count, anemia, thrombocytopenia, as well as fatigue and night sweats, he started ibrutinib on 5/4/20.      His WBC remains elevated, but stable.  Hemoglobin and platelet count are stable.  He has only been on ibrutinib for 2 months, and he had held it for a week due to a tooth extraction.  Therefore, we will continue it and continue to watch for improvement in his counts.        -continue ibrutinib.  We discussed potential side effects, including bleeding risk.  He is not on warfarin and has no history of arrhythmias.  He is on aspirin and Plavix though.  I discussed with pharmacy.  There is no contraindication, but we will monitor closely for bleeding.    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 He completed radiation at [facility] with planned 60 Gy x 30 fractions. He has completed radiation and noted that he did not need any more follow-up for this.    3) Abdominal aortic aneurysm: measures up to 7.6 cm on CT scan, incidentally found.  He underwent EVAR on 12/4/17.  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  He is on  Plavix now.  On 5/13/19, he underwent and percutaneous aneurysm sac puncture and endo leak embolization by IR on 5/13/19.  -follow-up with vascular surgery and IR    4) Diabetes, hypertension:  -following with PCP      Breana Perry MD  Hematology/Oncology  [facility]

## 2020-07-16 NOTE — LETTER
"    7/16/2020         RE: Austin Garza  [address] [place] [address]        Dear Colleague,    Thank you for referring your patient, Austin Garza, to the [facility]. Please see a copy of my visit note below.    Austin Garza is a 79 year old male who is being evaluated via a billable video visit.      The patient has been notified of following:     \"This video visit will be conducted via a call between you and your physician/provider. We have found that certain health care needs can be provided without the need for an in-person physical exam.  This service lets us provide the care you need with a video conversation.  If a prescription is necessary we can send it directly to your pharmacy.  If lab work is needed we can place an order for that and you can then stop by our lab to have the test done at a later time.    Video visits are billed at different rates depending on your insurance coverage.  Please reach out to your insurance provider with any questions.    If during the course of the call the physician/provider feels a video visit is not appropriate, you will not be charged for this service.\"    Patient has given verbal consent for Video visit? Yes  How would you like to obtain your AVS? MyChart  If you are dropped from the video visit, the video invite should be resent to: MY Chart video  Will anyone else be joining your video visit? No      Video-Visit Details    Type of service:  Video Visit    Video Start Time: 3:45 pm  Video End Time: 3:55 PM    Originating Location (pt. Location): Home    Distant Location (provider location):  [facility]     Platform used for Video Visit: [facility]    Hematology/Oncology Established Patient Note      Today's Date: 7/16/20    Reason for Follow-up: CLL      HISTORY OF PRESENT ILLNESS: Austin Garza is a 79 year old male with PMHx of DMII, HTN who presented with leukocytosis.  In November " 2017, he was found to have elevated WBC of 61.7K, hemoglobin of 10.4, and platelet of 115K.  There were no other CBC results available between 2010 and 2017.  Prior to 2010, he had normal CBC, with normal to mild anemia, and mild thrombocytopenia.   He was asymptomatic.    He underwent bone marrow biopsy on 11/21/17, which was consistent with chronic lymphocytic leukemia/small lymphocytic lymphoma, with 13% ringed sideroblasts identified.  The presence of increased numbers of ringed sideroblasts raises the possibility of an associated myelodysplastic syndrome.  Dysplastic changes are not identified though.  Many cases exhibiting ringed sideroblasts are metabolic origin, without significant risk of progression to acute leukemia, and these typically do not show dysplastic morphology as is the case with this specimen.  15% ringed sideroblasts are required for a diagnosis for RARS, which this specimen does not meet.  Flow cytometry is also consistent with CLL/SLL.  Cytogenetics show two (10%) of the metaphase cells comprise a clone characterized by a deletion within the proximal long arm of a chromosome 13 as the sole karyotypic abnormality.  Three (15%) metaphases had loss of the Y chromosome as the sole abnormality.    CT scan on 11/29/17 shows mildly enlarged left axillary lymph node and periaortic lymph nodes in the retroperitoneum of the abdomen.  Spleen is also enlarged.    On his staging CT scan for CLL, he was incidentally found to have a large infrarenal abdominal aortic aneurysm, measuring 7.6 cm.  He was seen by Dr. Thomas, and he underwent EVAR on 12/4/17.     He started ibrutinib on 5/4/20.  It was held 5/28/20-6/4/20 for tooth extraction.      INTERIM HISTORY: Austin says that he feels well.  He has resumed ibrutinib for the past month.  He denies significant side effects with it.  He previously has diarrhea, that he thought was due to ibrutinib, but he stopped metformin and the diarrhea stopped the next  day.        REVIEW OF SYSTEMS:   14 point ROS was reviewed and is negative other than as noted above in HPI.       HOME MEDICATIONS:  Current Outpatient Medications   Medication Sig Dispense Refill     aspirin (ASA) 81 MG tablet Take 1 tablet (81 mg) by mouth every evening (Patient taking differently: Take 325 mg by mouth every evening ) 90 tablet 3     atorvastatin (LIPITOR) 20 MG tablet Take 1 tablet (20 mg) by mouth At Bedtime 90 tablet 3     blood glucose (NO BRAND SPECIFIED) lancets standard Use to test blood sugar 1-2 times daily or as directed.  Dispense brand covered by insurance/Contour next per pt 100 each 1     blood glucose (NO BRAND SPECIFIED) test strip Use to test blood sugar 1-2 times daily or as directed (adjusting oral medications). 100 each 1     blood glucose monitoring (NO BRAND SPECIFIED) meter device kit Use to test blood sugar 1-2 times daily or as directed. Dispense brand as coved by insurance/Contour next per pt 1 kit 0     clopidogrel (PLAVIX) 75 MG tablet TAKE 1 TABLET BY MOUTH DAILY 90 tablet 1     glimepiride (AMARYL) 4 MG tablet Take 1 tablet (4 mg) by mouth every morning (before breakfast) 90 tablet 1     ibrutinib (IMBRUVICA) 420 MG tablet Take 1 tablet (420 mg) by mouth daily 28 tablet 0     ibrutinib (IMBRUVICA) 420 MG tablet Take 1 tablet (420 mg) by mouth daily 28 tablet 0     ibrutinib (IMBRUVICA) 420 MG tablet Take 1 tablet (420 mg) by mouth daily 28 tablet 0     losartan-hydrochlorothiazide (HYZAAR) 100-25 MG tablet Take 1 tablet by mouth daily 90 tablet 3     Multiple Vitamins-Minerals (CENTRUM SILVER) per tablet Take 1 tablet by mouth daily           ALLERGIES:  Allergies   Allergen Reactions     Ace Inhibitors      cough         PAST MEDICAL HISTORY:  Past Medical History:   Diagnosis Date     AAA (abdominal aortic aneurysm)      BCC (basal cell carcinoma of skin) - face      CLL (chronic lymphocytic leukemia)      Diaphragmatic hernia      Diverticulitis of colon       Esophageal reflux      Essential hypertension      Hyperlipidemia      Irritable bowel syndrome      Macular degeneration (senile) of retina      Squamous Cell Carcinoma, Back      Type 2 diabetes mellitus          PAST SURGICAL HISTORY:  Past Surgical History:   Procedure Laterality Date     APPENDECTOMY OPEN       BONE MARROW BIOPSY, BONE SPECIMEN, NEEDLE/TROCAR N/A 2017    Procedure: BIOPSY BONE MARROW;  BONE MARROW BIOPSY;  Surgeon: Shady Garcia MD;  Location:  GI     COLONOSCOPY       ENDOVASCULAR REPAIR ANEURYSM ABDOMINAL AORTA N/A 2017    Procedure: ENDOVASCULAR REPAIR ANEURYSM ABDOMINAL AORTA;  ENDOVASCULAR REPAIR ANEURYSM ABDOMINAL AORTA;  Surgeon: Milton Cazares MD;  Location:  OR     Fistulotomy with marsupialization for repair of fisture in ano       IR ABDOMINAL AORTOGRAM  3/11/2019     IR VISCERAL EMBOLIZATION  2019     Multiple skin tags - resection  1998     Squamous cell skin cancer resection - back           SOCIAL HISTORY:  Social History     Socioeconomic History     Marital status:      Spouse name: librado     Number of children: 0     Years of education: 17     Highest education level: Not on file   Occupational History     Occupation: retired   Social Needs     Financial resource strain: Not on file     Food insecurity     Worry: Not on file     Inability: Not on file     Transportation needs     Medical: Not on file     Non-medical: Not on file   Tobacco Use     Smoking status: Former Smoker     Packs/day: 0.50     Years: 20.00     Pack years: 10.00     Last attempt to quit: 1975     Years since quittin.5     Smokeless tobacco: Former User   Substance and Sexual Activity     Alcohol use: Yes     Alcohol/week: 10.0 - 14.0 standard drinks     Types: 10 - 14 Standard drinks or equivalent per week     Comment: 2 drinks a day/wine     Drug use: No     Sexual activity: Never   Lifestyle     Physical activity     Days per  week: Not on file     Minutes per session: Not on file     Stress: Not on file   Relationships     Social connections     Talks on phone: Not on file     Gets together: Not on file     Attends [employer] service: Not on file     Active member of club or organization: Not on file     Attends meetings of clubs or organizations: Not on file     Relationship status: Not on file     Intimate partner violence     Fear of current or ex partner: Not on file     Emotionally abused: Not on file     Physically abused: Not on file     Forced sexual activity: Not on file   Other Topics Concern     Parent/sibling w/ CABG, MI or angioplasty before 65F 55M? Not Asked   Social History Narrative     Not on file   He quit smoking in .  He drinks 1-3 glasses of wine a night with dinner.  He is retired, and used to work as a .  He lives with his wife in [place].  His cousin had lung cancer and  around age 69.  Otherwise, he denies family history of cancer.        FAMILY HISTORY:  Family History   Problem Relation Age of Onset     Cerebrovascular Disease Father         x 2     Hypertension Mother      C.A.D. Mother      Cancer Mother         skin     Eye Disorder Mother         glaucoma     Prostate Cancer No family hx of      Cancer - colorectal No family hx of      Glaucoma No family hx of      Macular Degeneration No family hx of          PHYSICAL EXAM:  Vital signs:  There were no vitals taken for this visit.   ECO  GENERAL/CONSTITUTIONAL: No acute distress. Healthy, alert.  EYES: No scleral icterus.  No redness or discharge.    RESPIRATORY: No audible wheeze, cough, or visible cyanosis.  No visible retractions or increased work of breathing.  Able to speak fully in complete sentences.  MUSCULOSKELETAL: Normal range of motion.  NEUROLOGIC: Alert, oriented, answers questions appropriately. No tremor. Mentation intact and speech normal  INTEGUMENTARY: No jaundice.  No obvious rash or skin lesions.  PSYCHIATRIC:   Mentation appears normal, affect normal/bright, judgement and insight intact, normal speech and appearance well-groomed.    The rest of a comprehensive physical exam is deferred due to public health emergency video visit restrictions.        LABS:  CBC RESULTS:   Recent Labs   Lab Test 07/14/20  1002   .6*   RBC 2.68*   HGB 8.6*   HCT 30.5*   *   MCH 32.1   MCHC 28.2*   RDW 19.8*   *         ASSESSMENT/PLAN:  Austin Garza is a 79 year old male with:    1) CLL/SLL: BLACKWOOD stage III, with lymphocytosis, lymphadenopathy, splenomegaly, and anemia.  He has mild thrombocytopenia as well, but is above 100K.  He presented with leukocytosis with WBC of 61.7K, hemoglobin of 10.4, and platelet count of 115K on diagnosis.  Bone marrow biopsy and flow cytometry confirmed CLL/SLL.  CT scan shows mildly enlarged left axillary lymph node and periaortic lymph nodes in the retroperitoneum of the abdomen, with enlarged spleen.      Due to worsening lymphocyte count, anemia, thrombocytopenia, as well as fatigue and night sweats, he started ibrutinib on 5/4/20.      His WBC remains elevated, but stable.  Hemoglobin and platelet count are stable.  He has only been on ibrutinib for 2 months, and he had held it for a week due to a tooth extraction.  Therefore, we will continue it and continue to watch for improvement in his counts.        -continue ibrutinib.  We discussed potential side effects, including bleeding risk.  He is not on warfarin and has no history of arrhythmias.  He is on aspirin and Plavix though.  I discussed with pharmacy.  There is no contraindication, but we will monitor closely for bleeding.    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 He completed radiation at [facility] with planned 60 Gy x 30 fractions. He has completed radiation and noted that he did not need  any more follow-up for this.    3) Abdominal aortic aneurysm: measures up to 7.6 cm on CT scan, incidentally found.  He underwent EVAR on 12/4/17.  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  He is on Plavix now.  On 5/13/19, he underwent and percutaneous aneurysm sac puncture and endo leak embolization by IR on 5/13/19.  -follow-up with vascular surgery and IR    4) Diabetes, hypertension:  -following with PCP      Breana Perry MD  Hematology/Oncology  [facility]                  Again, thank you for allowing me to participate in the care of your patient.        Sincerely,        Breana Perry MD

## 2020-07-16 NOTE — LETTER
"    7/16/2020         RE: Austin Garza  [address] [place] [address]        Dear Colleague,    Thank you for referring your patient, Austin Garza, to the [facility]. Please see a copy of my visit note below.    Austin Garza is a 79 year old male who is being evaluated via a billable video visit.      The patient has been notified of following:     \"This video visit will be conducted via a call between you and your physician/provider. We have found that certain health care needs can be provided without the need for an in-person physical exam.  This service lets us provide the care you need with a video conversation.  If a prescription is necessary we can send it directly to your pharmacy.  If lab work is needed we can place an order for that and you can then stop by our lab to have the test done at a later time.    Video visits are billed at different rates depending on your insurance coverage.  Please reach out to your insurance provider with any questions.    If during the course of the call the physician/provider feels a video visit is not appropriate, you will not be charged for this service.\"    Patient has given verbal consent for Video visit? Yes  How would you like to obtain your AVS? MyChart  If you are dropped from the video visit, the video invite should be resent to: MY Chart video  Will anyone else be joining your video visit? No      Video-Visit Details    Type of service:  Video Visit    Video Start Time: 3:45 pm  Video End Time: 3:55 PM    Originating Location (pt. Location): Home    Distant Location (provider location):  [facility]     Platform used for Video Visit: [facility]    Hematology/Oncology Established Patient Note      Today's Date: 7/16/20    Reason for Follow-up: CLL      HISTORY OF PRESENT ILLNESS: Austin Garza is a 79 year old male with PMHx of DMII, HTN who presented with leukocytosis.  In November " 2017, he was found to have elevated WBC of 61.7K, hemoglobin of 10.4, and platelet of 115K.  There were no other CBC results available between 2010 and 2017.  Prior to 2010, he had normal CBC, with normal to mild anemia, and mild thrombocytopenia.   He was asymptomatic.    He underwent bone marrow biopsy on 11/21/17, which was consistent with chronic lymphocytic leukemia/small lymphocytic lymphoma, with 13% ringed sideroblasts identified.  The presence of increased numbers of ringed sideroblasts raises the possibility of an associated myelodysplastic syndrome.  Dysplastic changes are not identified though.  Many cases exhibiting ringed sideroblasts are metabolic origin, without significant risk of progression to acute leukemia, and these typically do not show dysplastic morphology as is the case with this specimen.  15% ringed sideroblasts are required for a diagnosis for RARS, which this specimen does not meet.  Flow cytometry is also consistent with CLL/SLL.  Cytogenetics show two (10%) of the metaphase cells comprise a clone characterized by a deletion within the proximal long arm of a chromosome 13 as the sole karyotypic abnormality.  Three (15%) metaphases had loss of the Y chromosome as the sole abnormality.    CT scan on 11/29/17 shows mildly enlarged left axillary lymph node and periaortic lymph nodes in the retroperitoneum of the abdomen.  Spleen is also enlarged.    On his staging CT scan for CLL, he was incidentally found to have a large infrarenal abdominal aortic aneurysm, measuring 7.6 cm.  He was seen by Dr. Thomas, and he underwent EVAR on 12/4/17.     He started ibrutinib on 5/4/20.  It was held 5/28/20-6/4/20 for tooth extraction.      INTERIM HISTORY: Austin says that he feels well.  He has resumed ibrutinib for the past month.  He denies significant side effects with it.  He previously has diarrhea, that he thought was due to ibrutinib, but he stopped metformin and the diarrhea stopped the next  day.        REVIEW OF SYSTEMS:   14 point ROS was reviewed and is negative other than as noted above in HPI.       HOME MEDICATIONS:  Current Outpatient Medications   Medication Sig Dispense Refill     aspirin (ASA) 81 MG tablet Take 1 tablet (81 mg) by mouth every evening (Patient taking differently: Take 325 mg by mouth every evening ) 90 tablet 3     atorvastatin (LIPITOR) 20 MG tablet Take 1 tablet (20 mg) by mouth At Bedtime 90 tablet 3     blood glucose (NO BRAND SPECIFIED) lancets standard Use to test blood sugar 1-2 times daily or as directed.  Dispense brand covered by insurance/Contour next per pt 100 each 1     blood glucose (NO BRAND SPECIFIED) test strip Use to test blood sugar 1-2 times daily or as directed (adjusting oral medications). 100 each 1     blood glucose monitoring (NO BRAND SPECIFIED) meter device kit Use to test blood sugar 1-2 times daily or as directed. Dispense brand as coved by insurance/Contour next per pt 1 kit 0     clopidogrel (PLAVIX) 75 MG tablet TAKE 1 TABLET BY MOUTH DAILY 90 tablet 1     glimepiride (AMARYL) 4 MG tablet Take 1 tablet (4 mg) by mouth every morning (before breakfast) 90 tablet 1     ibrutinib (IMBRUVICA) 420 MG tablet Take 1 tablet (420 mg) by mouth daily 28 tablet 0     ibrutinib (IMBRUVICA) 420 MG tablet Take 1 tablet (420 mg) by mouth daily 28 tablet 0     ibrutinib (IMBRUVICA) 420 MG tablet Take 1 tablet (420 mg) by mouth daily 28 tablet 0     losartan-hydrochlorothiazide (HYZAAR) 100-25 MG tablet Take 1 tablet by mouth daily 90 tablet 3     Multiple Vitamins-Minerals (CENTRUM SILVER) per tablet Take 1 tablet by mouth daily           ALLERGIES:  Allergies   Allergen Reactions     Ace Inhibitors      cough         PAST MEDICAL HISTORY:  Past Medical History:   Diagnosis Date     AAA (abdominal aortic aneurysm)      BCC (basal cell carcinoma of skin) - face      CLL (chronic lymphocytic leukemia)      Diaphragmatic hernia      Diverticulitis of colon       Esophageal reflux      Essential hypertension      Hyperlipidemia      Irritable bowel syndrome      Macular degeneration (senile) of retina      Squamous Cell Carcinoma, Back      Type 2 diabetes mellitus          PAST SURGICAL HISTORY:  Past Surgical History:   Procedure Laterality Date     APPENDECTOMY OPEN       BONE MARROW BIOPSY, BONE SPECIMEN, NEEDLE/TROCAR N/A 2017    Procedure: BIOPSY BONE MARROW;  BONE MARROW BIOPSY;  Surgeon: Shady Garcia MD;  Location:  GI     COLONOSCOPY       ENDOVASCULAR REPAIR ANEURYSM ABDOMINAL AORTA N/A 2017    Procedure: ENDOVASCULAR REPAIR ANEURYSM ABDOMINAL AORTA;  ENDOVASCULAR REPAIR ANEURYSM ABDOMINAL AORTA;  Surgeon: Milton Cazares MD;  Location:  OR     Fistulotomy with marsupialization for repair of fisture in ano       IR ABDOMINAL AORTOGRAM  3/11/2019     IR VISCERAL EMBOLIZATION  2019     Multiple skin tags - resection  1998     Squamous cell skin cancer resection - back           SOCIAL HISTORY:  Social History     Socioeconomic History     Marital status:      Spouse name: librado     Number of children: 0     Years of education: 17     Highest education level: Not on file   Occupational History     Occupation: retired   Social Needs     Financial resource strain: Not on file     Food insecurity     Worry: Not on file     Inability: Not on file     Transportation needs     Medical: Not on file     Non-medical: Not on file   Tobacco Use     Smoking status: Former Smoker     Packs/day: 0.50     Years: 20.00     Pack years: 10.00     Last attempt to quit: 1975     Years since quittin.5     Smokeless tobacco: Former User   Substance and Sexual Activity     Alcohol use: Yes     Alcohol/week: 10.0 - 14.0 standard drinks     Types: 10 - 14 Standard drinks or equivalent per week     Comment: 2 drinks a day/wine     Drug use: No     Sexual activity: Never   Lifestyle     Physical activity     Days per  week: Not on file     Minutes per session: Not on file     Stress: Not on file   Relationships     Social connections     Talks on phone: Not on file     Gets together: Not on file     Attends [employer] service: Not on file     Active member of club or organization: Not on file     Attends meetings of clubs or organizations: Not on file     Relationship status: Not on file     Intimate partner violence     Fear of current or ex partner: Not on file     Emotionally abused: Not on file     Physically abused: Not on file     Forced sexual activity: Not on file   Other Topics Concern     Parent/sibling w/ CABG, MI or angioplasty before 65F 55M? Not Asked   Social History Narrative     Not on file   He quit smoking in .  He drinks 1-3 glasses of wine a night with dinner.  He is retired, and used to work as a .  He lives with his wife in [place].  His cousin had lung cancer and  around age 69.  Otherwise, he denies family history of cancer.        FAMILY HISTORY:  Family History   Problem Relation Age of Onset     Cerebrovascular Disease Father         x 2     Hypertension Mother      C.A.D. Mother      Cancer Mother         skin     Eye Disorder Mother         glaucoma     Prostate Cancer No family hx of      Cancer - colorectal No family hx of      Glaucoma No family hx of      Macular Degeneration No family hx of          PHYSICAL EXAM:  Vital signs:  There were no vitals taken for this visit.   ECO  GENERAL/CONSTITUTIONAL: No acute distress. Healthy, alert.  EYES: No scleral icterus.  No redness or discharge.    RESPIRATORY: No audible wheeze, cough, or visible cyanosis.  No visible retractions or increased work of breathing.  Able to speak fully in complete sentences.  MUSCULOSKELETAL: Normal range of motion.  NEUROLOGIC: Alert, oriented, answers questions appropriately. No tremor. Mentation intact and speech normal  INTEGUMENTARY: No jaundice.  No obvious rash or skin lesions.  PSYCHIATRIC:   Mentation appears normal, affect normal/bright, judgement and insight intact, normal speech and appearance well-groomed.    The rest of a comprehensive physical exam is deferred due to public health emergency video visit restrictions.        LABS:  CBC RESULTS:   Recent Labs   Lab Test 07/14/20  1002   .6*   RBC 2.68*   HGB 8.6*   HCT 30.5*   *   MCH 32.1   MCHC 28.2*   RDW 19.8*   *         ASSESSMENT/PLAN:  Austin Garza is a 79 year old male with:    1) CLL/SLL: BLACKWOOD stage III, with lymphocytosis, lymphadenopathy, splenomegaly, and anemia.  He has mild thrombocytopenia as well, but is above 100K.  He presented with leukocytosis with WBC of 61.7K, hemoglobin of 10.4, and platelet count of 115K on diagnosis.  Bone marrow biopsy and flow cytometry confirmed CLL/SLL.  CT scan shows mildly enlarged left axillary lymph node and periaortic lymph nodes in the retroperitoneum of the abdomen, with enlarged spleen.      Due to worsening lymphocyte count, anemia, thrombocytopenia, as well as fatigue and night sweats, he started ibrutinib on 5/4/20.      His WBC remains elevated, but stable.  Hemoglobin and platelet count are stable.  He has only been on ibrutinib for 2 months, and he had held it for a week due to a tooth extraction.  Therefore, we will continue it and continue to watch for improvement in his counts.        -continue ibrutinib.  We discussed potential side effects, including bleeding risk.  He is not on warfarin and has no history of arrhythmias.  He is on aspirin and Plavix though.  I discussed with pharmacy.  There is no contraindication, but we will monitor closely for bleeding.    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 He completed radiation at [facility] with planned 60 Gy x 30 fractions. He has completed radiation and noted that he did not need  any more follow-up for this.    3) Abdominal aortic aneurysm: measures up to 7.6 cm on CT scan, incidentally found.  He underwent EVAR on 12/4/17.  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  He is on Plavix now.  On 5/13/19, he underwent and percutaneous aneurysm sac puncture and endo leak embolization by IR on 5/13/19.  -follow-up with vascular surgery and IR    4) Diabetes, hypertension:  -following with PCP      Breana Perry MD  Hematology/Oncology  [facility]                  Again, thank you for allowing me to participate in the care of your patient.        Sincerely,        Breana Perry MD

## 2020-07-20 NOTE — PATIENT INSTRUCTIONS
Labs scheduled 8/11  Appt with Dr. Perry scheduled 8/13  Tosha Burris, RN, BSN, [facility]  Patient Care Coordinator  [facility]  [phone]

## 2020-07-21 DIAGNOSIS — C91.10 CLL (CHRONIC LYMPHOCYTIC LEUKEMIA) (H): Primary | ICD-10-CM

## 2020-08-11 ENCOUNTER — HOSPITAL ENCOUNTER (OUTPATIENT)
Facility: CLINIC | Age: 80
Setting detail: SPECIMEN
Discharge: HOME | End: 2020-08-11
Attending: INTERNAL MEDICINE | Admitting: INTERNAL MEDICINE
Payer: COMMERCIAL

## 2020-08-11 DIAGNOSIS — C91.10 CLL (CHRONIC LYMPHOCYTIC LEUKEMIA) (H): ICD-10-CM

## 2020-08-11 LAB
BASOPHILS # BLD AUTO: 0 10E9/L (ref 0–0.2)
BASOPHILS NFR BLD AUTO: 0 %
DIFFERENTIAL METHOD BLD: ABNORMAL
EOSINOPHIL # BLD AUTO: 0 10E9/L (ref 0–0.7)
EOSINOPHIL NFR BLD AUTO: 0 %
ERYTHROCYTE [DISTWIDTH] IN BLOOD BY AUTOMATED COUNT: 18.6 % (ref 10–15)
HCT VFR BLD AUTO: 27.8 % (ref 40–53)
HGB BLD-MCNC: 8.1 G/DL (ref 13.3–17.7)
LYMPHOCYTES # BLD AUTO: 111 10E9/L (ref 0.8–5.3)
LYMPHOCYTES NFR BLD AUTO: 97 %
MCH RBC QN AUTO: 32.8 PG (ref 26.5–33)
MCHC RBC AUTO-ENTMCNC: 29.1 G/DL (ref 31.5–36.5)
MCV RBC AUTO: 113 FL (ref 78–100)
MONOCYTES # BLD AUTO: 0 10E9/L (ref 0–1.3)
MONOCYTES NFR BLD AUTO: 0 %
NEUTROPHILS # BLD AUTO: 3.4 10E9/L (ref 1.6–8.3)
NEUTROPHILS NFR BLD AUTO: 3 %
PLATELET # BLD AUTO: 122 10E9/L (ref 150–450)
RBC # BLD AUTO: 2.47 10E12/L (ref 4.4–5.9)
URATE SERPL-MCNC: 8.3 MG/DL (ref 3.5–7.2)
WBC # BLD AUTO: 114.4 10E9/L (ref 4–11)

## 2020-08-11 PROCEDURE — 84550 ASSAY OF BLOOD/URIC ACID: CPT | Performed by: INTERNAL MEDICINE

## 2020-08-11 PROCEDURE — 85025 COMPLETE CBC W/AUTO DIFF WBC: CPT | Performed by: INTERNAL MEDICINE

## 2020-08-11 PROCEDURE — 36415 COLL VENOUS BLD VENIPUNCTURE: CPT

## 2020-08-11 NOTE — NURSING NOTE
Medical Assistant Note:  Austin Garza presents today for labs.    Patient seen by provider today: No.   present during visit today: Not Applicable.    Concerns: No Concerns.    Procedure:  Lab draw site: right AC, Needle type: butterfly, Gauge: 23.      Post Assessment:  Labs drawn without difficulty: Yes.  No evidence of extravasations.  Access discontinued per protocol.    Discharge Plan:  Patient discharged in stable condition accompanied by: self.  Departure Mode: Ambulatory.    Face to Face Time:  5 minutes.    Nicci Saavedra RN

## 2020-08-12 DIAGNOSIS — C91.10 CLL (CHRONIC LYMPHOCYTIC LEUKEMIA) (H): Primary | ICD-10-CM

## 2020-08-13 ENCOUNTER — VIRTUAL VISIT (OUTPATIENT)
Dept: ONCOLOGY | Facility: CLINIC | Age: 80
End: 2020-08-13
Attending: INTERNAL MEDICINE
Payer: COMMERCIAL

## 2020-08-13 DIAGNOSIS — C91.10 CLL (CHRONIC LYMPHOCYTIC LEUKEMIA) (H): Primary | ICD-10-CM

## 2020-08-13 PROCEDURE — 40001009 ZZH VIDEO/TELEPHONE VISIT; NO CHARGE

## 2020-08-13 PROCEDURE — 99213 OFFICE O/P EST LOW 20 MIN: CPT | Mod: 95 | Performed by: INTERNAL MEDICINE

## 2020-08-13 RX ORDER — ALLOPURINOL 300 MG/1
300 TABLET ORAL DAILY
Qty: 30 TABLET | Refills: 1 | Status: SHIPPED | OUTPATIENT
Start: 2020-08-13 | End: 2020-08-27

## 2020-08-13 NOTE — LETTER
"    8/13/2020         RE: Austin Garza  [address] [place] [address]        Dear Colleague,    Thank you for referring your patient, Austin Garza, to the [facility]. Please see a copy of my visit note below.    Austin Garza is a 80 year old male who is being evaluated via a billable video visit.      The patient has been notified of following:     \"This video visit will be conducted via a call between you and your physician/provider. We have found that certain health care needs can be provided without the need for an in-person physical exam.  This service lets us provide the care you need with a video conversation.  If a prescription is necessary we can send it directly to your pharmacy.  If lab work is needed we can place an order for that and you can then stop by our lab to have the test done at a later time.    Video visits are billed at different rates depending on your insurance coverage.  Please reach out to your insurance provider with any questions.    If during the course of the call the physician/provider feels a video visit is not appropriate, you will not be charged for this service.\"    Patient has given verbal consent for Video visit? Yes  How would you like to obtain your AVS? [phone]  If you are dropped from the video visit, the video invite should be resent to: My Chart   Will anyone else be joining your video visit? No      Video-Visit Details    Type of service:  Video Visit    Video Start Time: 9:25 am  Video End Time: 9:32 AM    Originating Location (pt. Location): Home    Distant Location (provider location):  [facility]     Platform used for Video Visit: Julian, and then switched to [facility]    Hematology/Oncology Established Patient Note      Today's Date: 8/13/20    Reason for Follow-up: CLL      HISTORY OF PRESENT ILLNESS: Austin Garza is a 80 year old male with PMHx of DMII, HTN who presented " with leukocytosis.  In November 2017, he was found to have elevated WBC of 61.7K, hemoglobin of 10.4, and platelet of 115K.  There were no other CBC results available between 2010 and 2017.  Prior to 2010, he had normal CBC, with normal to mild anemia, and mild thrombocytopenia.   He was asymptomatic.    He underwent bone marrow biopsy on 11/21/17, which was consistent with chronic lymphocytic leukemia/small lymphocytic lymphoma, with 13% ringed sideroblasts identified.  The presence of increased numbers of ringed sideroblasts raises the possibility of an associated myelodysplastic syndrome.  Dysplastic changes are not identified though.  Many cases exhibiting ringed sideroblasts are metabolic origin, without significant risk of progression to acute leukemia, and these typically do not show dysplastic morphology as is the case with this specimen.  15% ringed sideroblasts are required for a diagnosis for RARS, which this specimen does not meet.  Flow cytometry is also consistent with CLL/SLL.  Cytogenetics show two (10%) of the metaphase cells comprise a clone characterized by a deletion within the proximal long arm of a chromosome 13 as the sole karyotypic abnormality.  Three (15%) metaphases had loss of the Y chromosome as the sole abnormality.    CT scan on 11/29/17 shows mildly enlarged left axillary lymph node and periaortic lymph nodes in the retroperitoneum of the abdomen.  Spleen is also enlarged.    On his staging CT scan for CLL, he was incidentally found to have a large infrarenal abdominal aortic aneurysm, measuring 7.6 cm.  He was seen by Dr. Thomas, and he underwent EVAR on 12/4/17.     He started ibrutinib on 5/4/20.  It was held 5/28/20-6/4/20 for tooth extraction.      INTERIM HISTORY: Austin says that he feels well.  He continues to take ibrutinib and denies problems with it.        REVIEW OF SYSTEMS:   14 point ROS was reviewed and is negative other than as noted above in HPI.       HOME  MEDICATIONS:  Current Outpatient Medications   Medication Sig Dispense Refill     aspirin (ASA) 81 MG tablet Take 1 tablet (81 mg) by mouth every evening (Patient taking differently: Take 325 mg by mouth every evening ) 90 tablet 3     atorvastatin (LIPITOR) 20 MG tablet Take 1 tablet (20 mg) by mouth At Bedtime 90 tablet 3     blood glucose (NO BRAND SPECIFIED) lancets standard Use to test blood sugar 1-2 times daily or as directed.  Dispense brand covered by insurance/Contour next per pt 100 each 1     blood glucose (NO BRAND SPECIFIED) test strip Use to test blood sugar 1-2 times daily or as directed (adjusting oral medications). 100 each 1     blood glucose monitoring (NO BRAND SPECIFIED) meter device kit Use to test blood sugar 1-2 times daily or as directed. Dispense brand as coved by insurance/Contour next per pt 1 kit 0     clopidogrel (PLAVIX) 75 MG tablet TAKE 1 TABLET BY MOUTH DAILY 90 tablet 1     glimepiride (AMARYL) 4 MG tablet Take 1 tablet (4 mg) by mouth every morning (before breakfast) 90 tablet 1     ibrutinib (IMBRUVICA) 420 MG tablet Take 1 tablet (420 mg) by mouth daily 28 tablet 0     losartan-hydrochlorothiazide (HYZAAR) 100-25 MG tablet Take 1 tablet by mouth daily 90 tablet 3     Multiple Vitamins-Minerals (CENTRUM SILVER) per tablet Take 1 tablet by mouth daily       ibrutinib (IMBRUVICA) 420 MG tablet Take 1 tablet (420 mg) by mouth daily 28 tablet 0     ibrutinib (IMBRUVICA) 420 MG tablet Take 1 tablet (420 mg) by mouth daily 28 tablet 0     ibrutinib (IMBRUVICA) 420 MG tablet Take 1 tablet (420 mg) by mouth daily 28 tablet 0     ibrutinib (IMBRUVICA) 420 MG tablet Take 1 tablet (420 mg) by mouth daily 28 tablet 0         ALLERGIES:  Allergies   Allergen Reactions     Ace Inhibitors      cough         PAST MEDICAL HISTORY:  Past Medical History:   Diagnosis Date     AAA (abdominal aortic aneurysm)      BCC (basal cell carcinoma of skin) - face      CLL (chronic lymphocytic leukemia)       Diaphragmatic hernia      Diverticulitis of colon      Esophageal reflux      Essential hypertension      Hyperlipidemia      Irritable bowel syndrome      Macular degeneration (senile) of retina      Squamous Cell Carcinoma, Back      Type 2 diabetes mellitus          PAST SURGICAL HISTORY:  Past Surgical History:   Procedure Laterality Date     APPENDECTOMY OPEN       BONE MARROW BIOPSY, BONE SPECIMEN, NEEDLE/TROCAR N/A 2017    Procedure: BIOPSY BONE MARROW;  BONE MARROW BIOPSY;  Surgeon: Shady Garcia MD;  Location:  GI     COLONOSCOPY       ENDOVASCULAR REPAIR ANEURYSM ABDOMINAL AORTA N/A 2017    Procedure: ENDOVASCULAR REPAIR ANEURYSM ABDOMINAL AORTA;  ENDOVASCULAR REPAIR ANEURYSM ABDOMINAL AORTA;  Surgeon: Milton Cazares MD;  Location:  OR     Fistulotomy with marsupialization for repair of fisture in ano       IR ABDOMINAL AORTOGRAM  3/11/2019     IR VISCERAL EMBOLIZATION  2019     Multiple skin tags - resection  1998     Squamous cell skin cancer resection - back           SOCIAL HISTORY:  Social History     Socioeconomic History     Marital status:      Spouse name: librado     Number of children: 0     Years of education: 17     Highest education level: Not on file   Occupational History     Occupation: retired   Social Needs     Financial resource strain: Not on file     Food insecurity     Worry: Not on file     Inability: Not on file     Transportation needs     Medical: Not on file     Non-medical: Not on file   Tobacco Use     Smoking status: Former Smoker     Packs/day: 0.50     Years: 20.00     Pack years: 10.00     Last attempt to quit: 1975     Years since quittin.6     Smokeless tobacco: Former User   Substance and Sexual Activity     Alcohol use: Yes     Alcohol/week: 10.0 - 14.0 standard drinks     Types: 10 - 14 Standard drinks or equivalent per week     Comment: 2 drinks a day/wine     Drug use: No     Sexual activity:  Never   Lifestyle     Physical activity     Days per week: Not on file     Minutes per session: Not on file     Stress: Not on file   Relationships     Social connections     Talks on phone: Not on file     Gets together: Not on file     Attends Synagogue service: Not on file     Active member of club or organization: Not on file     Attends meetings of clubs or organizations: Not on file     Relationship status: Not on file     Intimate partner violence     Fear of current or ex partner: Not on file     Emotionally abused: Not on file     Physically abused: Not on file     Forced sexual activity: Not on file   Other Topics Concern     Parent/sibling w/ CABG, MI or angioplasty before 65F 55M? Not Asked   Social History Narrative     Not on file   He quit smoking in .  He drinks 1-3 glasses of wine a night with dinner.  He is retired, and used to work as a .  He lives with his wife in [place].  His cousin had lung cancer and  around age 69.  Otherwise, he denies family history of cancer.        FAMILY HISTORY:  Family History   Problem Relation Age of Onset     Cerebrovascular Disease Father         x 2     Hypertension Mother      C.A.D. Mother      Cancer Mother         skin     Eye Disorder Mother         glaucoma     Prostate Cancer No family hx of      Cancer - colorectal No family hx of      Glaucoma No family hx of      Macular Degeneration No family hx of          PHYSICAL EXAM:  Vital signs:  There were no vitals taken for this visit.   ECO  GENERAL/CONSTITUTIONAL: No acute distress. Healthy, alert.  EYES: No scleral icterus.  No redness or discharge.    RESPIRATORY: No audible wheeze, cough, or visible cyanosis.  No visible retractions or increased work of breathing.  Able to speak fully in complete sentences.  MUSCULOSKELETAL: Normal range of motion.  NEUROLOGIC: Alert, oriented, answers questions appropriately. No tremor. Mentation intact and speech normal  INTEGUMENTARY: No jaundice.  No  obvious rash or skin lesions.  PSYCHIATRIC:  Mentation appears normal, affect normal/bright, judgement and insight intact, normal speech and appearance well-groomed.    The rest of a comprehensive physical exam is deferred due to public health emergency video visit restrictions.        LABS:  CBC RESULTS:   Recent Labs   Lab Test 08/11/20  1000   .4*   RBC 2.47*   HGB 8.1*   HCT 27.8*   *   MCH 32.8   MCHC 29.1*   RDW 18.6*   *           ASSESSMENT/PLAN:  Austin Garza is a 80 year old male with:    1) CLL/SLL: BLACKWOOD stage III, with lymphocytosis, lymphadenopathy, splenomegaly, and anemia.  He has mild thrombocytopenia as well, but is above 100K.  He presented with leukocytosis with WBC of 61.7K, hemoglobin of 10.4, and platelet count of 115K on diagnosis.  Bone marrow biopsy and flow cytometry confirmed CLL/SLL.  CT scan shows mildly enlarged left axillary lymph node and periaortic lymph nodes in the retroperitoneum of the abdomen, with enlarged spleen.      Due to worsening lymphocyte count, anemia, thrombocytopenia, as well as fatigue and night sweats, he started ibrutinib on 5/4/20.      His WBC remains elevated, but has trended down some.  Hemoglobin and platelet count are stable.          -continue ibrutinib.  We discussed potential side effects, including bleeding risk.  He is not on warfarin and has no history of arrhythmias.  He is on aspirin and Plavix though.  I discussed with pharmacy.  There is no contraindication, but we will monitor closely for bleeding.    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 He completed radiation at [facility] with planned 60 Gy x 30 fractions. He has completed radiation and noted that he did not need any more follow-up for this.    3) Abdominal aortic aneurysm:  measures up to 7.6 cm on CT scan, incidentally found.  He underwent EVAR on 12/4/17.  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  He is on Plavix now.  On 5/13/19, he underwent and percutaneous aneurysm sac puncture and endo leak embolization by IR on 5/13/19.  -follow-up with vascular surgery and IR    4) Diabetes, hypertension:  -following with PCP      Breana Perry MD  Hematology/Oncology  [facility]                    Again, thank you for allowing me to participate in the care of your patient.        Sincerely,        Breana Perry MD

## 2020-08-13 NOTE — LETTER
"    8/13/2020         RE: Austin Garza  [address] [place] [address]        Dear Colleague,    Thank you for referring your patient, Austin Garza, to the [facility]. Please see a copy of my visit note below.    Austin Garza is a 80 year old male who is being evaluated via a billable video visit.      The patient has been notified of following:     \"This video visit will be conducted via a call between you and your physician/provider. We have found that certain health care needs can be provided without the need for an in-person physical exam.  This service lets us provide the care you need with a video conversation.  If a prescription is necessary we can send it directly to your pharmacy.  If lab work is needed we can place an order for that and you can then stop by our lab to have the test done at a later time.    Video visits are billed at different rates depending on your insurance coverage.  Please reach out to your insurance provider with any questions.    If during the course of the call the physician/provider feels a video visit is not appropriate, you will not be charged for this service.\"    Patient has given verbal consent for Video visit? Yes  How would you like to obtain your AVS? [phone]  If you are dropped from the video visit, the video invite should be resent to: My Chart   Will anyone else be joining your video visit? No      Video-Visit Details    Type of service:  Video Visit    Video Start Time: 9:25 am  Video End Time: 9:32 AM    Originating Location (pt. Location): Home    Distant Location (provider location):  [facility]     Platform used for Video Visit: Julian, and then switched to [facility]    Hematology/Oncology Established Patient Note      Today's Date: 8/13/20    Reason for Follow-up: CLL      HISTORY OF PRESENT ILLNESS: Austin Garza is a 80 year old male with PMHx of DMII, HTN who presented " with leukocytosis.  In November 2017, he was found to have elevated WBC of 61.7K, hemoglobin of 10.4, and platelet of 115K.  There were no other CBC results available between 2010 and 2017.  Prior to 2010, he had normal CBC, with normal to mild anemia, and mild thrombocytopenia.   He was asymptomatic.    He underwent bone marrow biopsy on 11/21/17, which was consistent with chronic lymphocytic leukemia/small lymphocytic lymphoma, with 13% ringed sideroblasts identified.  The presence of increased numbers of ringed sideroblasts raises the possibility of an associated myelodysplastic syndrome.  Dysplastic changes are not identified though.  Many cases exhibiting ringed sideroblasts are metabolic origin, without significant risk of progression to acute leukemia, and these typically do not show dysplastic morphology as is the case with this specimen.  15% ringed sideroblasts are required for a diagnosis for RARS, which this specimen does not meet.  Flow cytometry is also consistent with CLL/SLL.  Cytogenetics show two (10%) of the metaphase cells comprise a clone characterized by a deletion within the proximal long arm of a chromosome 13 as the sole karyotypic abnormality.  Three (15%) metaphases had loss of the Y chromosome as the sole abnormality.    CT scan on 11/29/17 shows mildly enlarged left axillary lymph node and periaortic lymph nodes in the retroperitoneum of the abdomen.  Spleen is also enlarged.    On his staging CT scan for CLL, he was incidentally found to have a large infrarenal abdominal aortic aneurysm, measuring 7.6 cm.  He was seen by Dr. Thomas, and he underwent EVAR on 12/4/17.     He started ibrutinib on 5/4/20.  It was held 5/28/20-6/4/20 for tooth extraction.      INTERIM HISTORY: Austin says that he feels well.  He continues to take ibrutinib and denies problems with it.        REVIEW OF SYSTEMS:   14 point ROS was reviewed and is negative other than as noted above in HPI.       HOME  MEDICATIONS:  Current Outpatient Medications   Medication Sig Dispense Refill     aspirin (ASA) 81 MG tablet Take 1 tablet (81 mg) by mouth every evening (Patient taking differently: Take 325 mg by mouth every evening ) 90 tablet 3     atorvastatin (LIPITOR) 20 MG tablet Take 1 tablet (20 mg) by mouth At Bedtime 90 tablet 3     blood glucose (NO BRAND SPECIFIED) lancets standard Use to test blood sugar 1-2 times daily or as directed.  Dispense brand covered by insurance/Contour next per pt 100 each 1     blood glucose (NO BRAND SPECIFIED) test strip Use to test blood sugar 1-2 times daily or as directed (adjusting oral medications). 100 each 1     blood glucose monitoring (NO BRAND SPECIFIED) meter device kit Use to test blood sugar 1-2 times daily or as directed. Dispense brand as coved by insurance/Contour next per pt 1 kit 0     clopidogrel (PLAVIX) 75 MG tablet TAKE 1 TABLET BY MOUTH DAILY 90 tablet 1     glimepiride (AMARYL) 4 MG tablet Take 1 tablet (4 mg) by mouth every morning (before breakfast) 90 tablet 1     ibrutinib (IMBRUVICA) 420 MG tablet Take 1 tablet (420 mg) by mouth daily 28 tablet 0     losartan-hydrochlorothiazide (HYZAAR) 100-25 MG tablet Take 1 tablet by mouth daily 90 tablet 3     Multiple Vitamins-Minerals (CENTRUM SILVER) per tablet Take 1 tablet by mouth daily       ibrutinib (IMBRUVICA) 420 MG tablet Take 1 tablet (420 mg) by mouth daily 28 tablet 0     ibrutinib (IMBRUVICA) 420 MG tablet Take 1 tablet (420 mg) by mouth daily 28 tablet 0     ibrutinib (IMBRUVICA) 420 MG tablet Take 1 tablet (420 mg) by mouth daily 28 tablet 0     ibrutinib (IMBRUVICA) 420 MG tablet Take 1 tablet (420 mg) by mouth daily 28 tablet 0         ALLERGIES:  Allergies   Allergen Reactions     Ace Inhibitors      cough         PAST MEDICAL HISTORY:  Past Medical History:   Diagnosis Date     AAA (abdominal aortic aneurysm)      BCC (basal cell carcinoma of skin) - face      CLL (chronic lymphocytic leukemia)       Diaphragmatic hernia      Diverticulitis of colon      Esophageal reflux      Essential hypertension      Hyperlipidemia      Irritable bowel syndrome      Macular degeneration (senile) of retina      Squamous Cell Carcinoma, Back      Type 2 diabetes mellitus          PAST SURGICAL HISTORY:  Past Surgical History:   Procedure Laterality Date     APPENDECTOMY OPEN       BONE MARROW BIOPSY, BONE SPECIMEN, NEEDLE/TROCAR N/A 2017    Procedure: BIOPSY BONE MARROW;  BONE MARROW BIOPSY;  Surgeon: Shady Garcia MD;  Location:  GI     COLONOSCOPY       ENDOVASCULAR REPAIR ANEURYSM ABDOMINAL AORTA N/A 2017    Procedure: ENDOVASCULAR REPAIR ANEURYSM ABDOMINAL AORTA;  ENDOVASCULAR REPAIR ANEURYSM ABDOMINAL AORTA;  Surgeon: Milton Cazares MD;  Location:  OR     Fistulotomy with marsupialization for repair of fisture in ano       IR ABDOMINAL AORTOGRAM  3/11/2019     IR VISCERAL EMBOLIZATION  2019     Multiple skin tags - resection  1998     Squamous cell skin cancer resection - back           SOCIAL HISTORY:  Social History     Socioeconomic History     Marital status:      Spouse name: librado     Number of children: 0     Years of education: 17     Highest education level: Not on file   Occupational History     Occupation: retired   Social Needs     Financial resource strain: Not on file     Food insecurity     Worry: Not on file     Inability: Not on file     Transportation needs     Medical: Not on file     Non-medical: Not on file   Tobacco Use     Smoking status: Former Smoker     Packs/day: 0.50     Years: 20.00     Pack years: 10.00     Last attempt to quit: 1975     Years since quittin.6     Smokeless tobacco: Former User   Substance and Sexual Activity     Alcohol use: Yes     Alcohol/week: 10.0 - 14.0 standard drinks     Types: 10 - 14 Standard drinks or equivalent per week     Comment: 2 drinks a day/wine     Drug use: No     Sexual activity:  Never   Lifestyle     Physical activity     Days per week: Not on file     Minutes per session: Not on file     Stress: Not on file   Relationships     Social connections     Talks on phone: Not on file     Gets together: Not on file     Attends Nondenominational service: Not on file     Active member of club or organization: Not on file     Attends meetings of clubs or organizations: Not on file     Relationship status: Not on file     Intimate partner violence     Fear of current or ex partner: Not on file     Emotionally abused: Not on file     Physically abused: Not on file     Forced sexual activity: Not on file   Other Topics Concern     Parent/sibling w/ CABG, MI or angioplasty before 65F 55M? Not Asked   Social History Narrative     Not on file   He quit smoking in .  He drinks 1-3 glasses of wine a night with dinner.  He is retired, and used to work as a .  He lives with his wife in [place].  His cousin had lung cancer and  around age 69.  Otherwise, he denies family history of cancer.        FAMILY HISTORY:  Family History   Problem Relation Age of Onset     Cerebrovascular Disease Father         x 2     Hypertension Mother      C.A.D. Mother      Cancer Mother         skin     Eye Disorder Mother         glaucoma     Prostate Cancer No family hx of      Cancer - colorectal No family hx of      Glaucoma No family hx of      Macular Degeneration No family hx of          PHYSICAL EXAM:  Vital signs:  There were no vitals taken for this visit.   ECO  GENERAL/CONSTITUTIONAL: No acute distress. Healthy, alert.  EYES: No scleral icterus.  No redness or discharge.    RESPIRATORY: No audible wheeze, cough, or visible cyanosis.  No visible retractions or increased work of breathing.  Able to speak fully in complete sentences.  MUSCULOSKELETAL: Normal range of motion.  NEUROLOGIC: Alert, oriented, answers questions appropriately. No tremor. Mentation intact and speech normal  INTEGUMENTARY: No jaundice.  No  obvious rash or skin lesions.  PSYCHIATRIC:  Mentation appears normal, affect normal/bright, judgement and insight intact, normal speech and appearance well-groomed.    The rest of a comprehensive physical exam is deferred due to public health emergency video visit restrictions.        LABS:  CBC RESULTS:   Recent Labs   Lab Test 08/11/20  1000   .4*   RBC 2.47*   HGB 8.1*   HCT 27.8*   *   MCH 32.8   MCHC 29.1*   RDW 18.6*   *           ASSESSMENT/PLAN:  Austin Garza is a 80 year old male with:    1) CLL/SLL: BLACKWOOD stage III, with lymphocytosis, lymphadenopathy, splenomegaly, and anemia.  He has mild thrombocytopenia as well, but is above 100K.  He presented with leukocytosis with WBC of 61.7K, hemoglobin of 10.4, and platelet count of 115K on diagnosis.  Bone marrow biopsy and flow cytometry confirmed CLL/SLL.  CT scan shows mildly enlarged left axillary lymph node and periaortic lymph nodes in the retroperitoneum of the abdomen, with enlarged spleen.      Due to worsening lymphocyte count, anemia, thrombocytopenia, as well as fatigue and night sweats, he started ibrutinib on 5/4/20.      His WBC remains elevated, but has trended down some.  Hemoglobin and platelet count are stable.          -continue ibrutinib.  We discussed potential side effects, including bleeding risk.  He is not on warfarin and has no history of arrhythmias.  He is on aspirin and Plavix though.  I discussed with pharmacy.  There is no contraindication, but we will monitor closely for bleeding.    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 He completed radiation at [facility] with planned 60 Gy x 30 fractions. He has completed radiation and noted that he did not need any more follow-up for this.    3) Abdominal aortic aneurysm:  measures up to 7.6 cm on CT scan, incidentally found.  He underwent EVAR on 12/4/17.  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  He is on Plavix now.  On 5/13/19, he underwent and percutaneous aneurysm sac puncture and endo leak embolization by IR on 5/13/19.  -follow-up with vascular surgery and IR    4) Diabetes, hypertension:  -following with PCP      Breana Perry MD  Hematology/Oncology  [facility]                    Again, thank you for allowing me to participate in the care of your patient.        Sincerely,        Breana Perry MD

## 2020-08-14 NOTE — PATIENT INSTRUCTIONS
9/4 labs   9/8 appt with Dr. Orlanod Burris, RN, BSN, NAELM  RN Care Coordinator  [facility]  [phone]

## 2020-08-23 ENCOUNTER — HOSPITAL ENCOUNTER (OUTPATIENT)
Facility: CLINIC | Age: 80
Setting detail: OBSERVATION
Discharge: HOME | End: 2020-08-24
Attending: EMERGENCY MEDICINE | Admitting: INTERNAL MEDICINE
Payer: COMMERCIAL

## 2020-08-23 DIAGNOSIS — N18.30 CKD (CHRONIC KIDNEY DISEASE) STAGE 3, GFR 30-59 ML/MIN (H): ICD-10-CM

## 2020-08-23 DIAGNOSIS — E11.9 TYPE 2 DIABETES MELLITUS WITHOUT COMPLICATION, WITHOUT LONG-TERM CURRENT USE OF INSULIN (H): Primary | ICD-10-CM

## 2020-08-23 DIAGNOSIS — E16.2 HYPOGLYCEMIA: ICD-10-CM

## 2020-08-23 LAB
ANION GAP SERPL CALCULATED.3IONS-SCNC: 2 MMOL/L (ref 3–14)
ANION GAP SERPL CALCULATED.3IONS-SCNC: 7 MMOL/L (ref 3–14)
BUN SERPL-MCNC: 36 MG/DL (ref 7–30)
BUN SERPL-MCNC: 37 MG/DL (ref 7–30)
CALCIUM SERPL-MCNC: 7.6 MG/DL (ref 8.5–10.1)
CALCIUM SERPL-MCNC: 7.9 MG/DL (ref 8.5–10.1)
CHLORIDE SERPL-SCNC: 106 MMOL/L (ref 94–109)
CHLORIDE SERPL-SCNC: 108 MMOL/L (ref 94–109)
CO2 SERPL-SCNC: 24 MMOL/L (ref 20–32)
CO2 SERPL-SCNC: 26 MMOL/L (ref 20–32)
CREAT SERPL-MCNC: 1.75 MG/DL (ref 0.66–1.25)
CREAT SERPL-MCNC: 1.94 MG/DL (ref 0.66–1.25)
GFR SERPL CREATININE-BSD FRML MDRD: 32 ML/MIN/{1.73_M2}
GFR SERPL CREATININE-BSD FRML MDRD: 36 ML/MIN/{1.73_M2}
GLUCOSE BLDC GLUCOMTR-MCNC: 100 MG/DL (ref 70–99)
GLUCOSE BLDC GLUCOMTR-MCNC: 122 MG/DL (ref 70–99)
GLUCOSE BLDC GLUCOMTR-MCNC: 140 MG/DL (ref 70–99)
GLUCOSE BLDC GLUCOMTR-MCNC: 142 MG/DL (ref 70–99)
GLUCOSE BLDC GLUCOMTR-MCNC: 166 MG/DL (ref 70–99)
GLUCOSE BLDC GLUCOMTR-MCNC: 177 MG/DL (ref 70–99)
GLUCOSE BLDC GLUCOMTR-MCNC: 178 MG/DL (ref 70–99)
GLUCOSE BLDC GLUCOMTR-MCNC: 185 MG/DL (ref 70–99)
GLUCOSE BLDC GLUCOMTR-MCNC: 194 MG/DL (ref 70–99)
GLUCOSE BLDC GLUCOMTR-MCNC: 197 MG/DL (ref 70–99)
GLUCOSE BLDC GLUCOMTR-MCNC: 198 MG/DL (ref 70–99)
GLUCOSE BLDC GLUCOMTR-MCNC: 198 MG/DL (ref 70–99)
GLUCOSE BLDC GLUCOMTR-MCNC: 206 MG/DL (ref 70–99)
GLUCOSE BLDC GLUCOMTR-MCNC: 217 MG/DL (ref 70–99)
GLUCOSE BLDC GLUCOMTR-MCNC: 223 MG/DL (ref 70–99)
GLUCOSE BLDC GLUCOMTR-MCNC: 53 MG/DL (ref 70–99)
GLUCOSE SERPL-MCNC: 187 MG/DL (ref 70–99)
GLUCOSE SERPL-MCNC: 212 MG/DL (ref 70–99)
HBA1C MFR BLD: 5.6 % (ref 0–5.6)
POTASSIUM SERPL-SCNC: 3.4 MMOL/L (ref 3.4–5.3)
POTASSIUM SERPL-SCNC: 4.6 MMOL/L (ref 3.4–5.3)
SODIUM SERPL-SCNC: 136 MMOL/L (ref 133–144)
SODIUM SERPL-SCNC: 137 MMOL/L (ref 133–144)
TROPONIN I SERPL-MCNC: <0.015 UG/L (ref 0–0.04)
URATE SERPL-MCNC: 4.7 MG/DL (ref 3.5–7.2)

## 2020-08-23 PROCEDURE — 99220 ZZC INITIAL OBSERVATION CARE,LEVL III: CPT | Performed by: INTERNAL MEDICINE

## 2020-08-23 PROCEDURE — 25800030 ZZH RX IP 258 OP 636: Performed by: EMERGENCY MEDICINE

## 2020-08-23 PROCEDURE — 96361 HYDRATE IV INFUSION ADD-ON: CPT

## 2020-08-23 PROCEDURE — 83036 HEMOGLOBIN GLYCOSYLATED A1C: CPT | Performed by: EMERGENCY MEDICINE

## 2020-08-23 PROCEDURE — 25800025 ZZH RX 258

## 2020-08-23 PROCEDURE — 00000146 ZZHCL STATISTIC GLUCOSE BY METER IP

## 2020-08-23 PROCEDURE — 85025 COMPLETE CBC W/AUTO DIFF WBC: CPT | Performed by: EMERGENCY MEDICINE

## 2020-08-23 PROCEDURE — 84550 ASSAY OF BLOOD/URIC ACID: CPT | Performed by: EMERGENCY MEDICINE

## 2020-08-23 PROCEDURE — 84484 ASSAY OF TROPONIN QUANT: CPT | Performed by: EMERGENCY MEDICINE

## 2020-08-23 PROCEDURE — G0378 HOSPITAL OBSERVATION PER HR: HCPCS

## 2020-08-23 PROCEDURE — 36415 COLL VENOUS BLD VENIPUNCTURE: CPT | Performed by: INTERNAL MEDICINE

## 2020-08-23 PROCEDURE — 99285 EMERGENCY DEPT VISIT HI MDM: CPT | Mod: 25

## 2020-08-23 PROCEDURE — U0003 INFECTIOUS AGENT DETECTION BY NUCLEIC ACID (DNA OR RNA); SEVERE ACUTE RESPIRATORY SYNDROME CORONAVIRUS 2 (SARS-COV-2) (CORONAVIRUS DISEASE [COVID-19]), AMPLIFIED PROBE TECHNIQUE, MAKING USE OF HIGH THROUGHPUT TECHNOLOGIES AS DESCRIBED BY CMS-2020-01-R: HCPCS | Performed by: INTERNAL MEDICINE

## 2020-08-23 PROCEDURE — 93005 ELECTROCARDIOGRAM TRACING: CPT

## 2020-08-23 PROCEDURE — 80048 BASIC METABOLIC PNL TOTAL CA: CPT | Performed by: INTERNAL MEDICINE

## 2020-08-23 PROCEDURE — 25800030 ZZH RX IP 258 OP 636: Performed by: INTERNAL MEDICINE

## 2020-08-23 PROCEDURE — C9803 HOPD COVID-19 SPEC COLLECT: HCPCS

## 2020-08-23 PROCEDURE — 96360 HYDRATION IV INFUSION INIT: CPT

## 2020-08-23 PROCEDURE — 25000132 ZZH RX MED GY IP 250 OP 250 PS 637: Performed by: INTERNAL MEDICINE

## 2020-08-23 PROCEDURE — 80048 BASIC METABOLIC PNL TOTAL CA: CPT | Performed by: EMERGENCY MEDICINE

## 2020-08-23 RX ORDER — POLYETHYLENE GLYCOL 3350 17 G/17G
17 POWDER, FOR SOLUTION ORAL DAILY PRN
Status: DISCONTINUED | OUTPATIENT
Start: 2020-08-23 | End: 2020-08-24 | Stop reason: HOSPADM

## 2020-08-23 RX ORDER — DEXTROSE MONOHYDRATE 25 G/50ML
INJECTION, SOLUTION INTRAVENOUS
Status: COMPLETED
Start: 2020-08-23 | End: 2020-08-23

## 2020-08-23 RX ORDER — ASPIRIN 81 MG/1
81 TABLET, CHEWABLE ORAL EVERY EVENING
Status: DISCONTINUED | OUTPATIENT
Start: 2020-08-23 | End: 2020-08-23

## 2020-08-23 RX ORDER — ONDANSETRON 2 MG/ML
4 INJECTION INTRAMUSCULAR; INTRAVENOUS EVERY 6 HOURS PRN
Status: DISCONTINUED | OUTPATIENT
Start: 2020-08-23 | End: 2020-08-24 | Stop reason: HOSPADM

## 2020-08-23 RX ORDER — LOSARTAN POTASSIUM AND HYDROCHLOROTHIAZIDE 12.5; 5 MG/1; MG/1
1 TABLET ORAL 2 TIMES DAILY
Status: ON HOLD | COMMUNITY
End: 2020-08-24

## 2020-08-23 RX ORDER — ATORVASTATIN CALCIUM 20 MG/1
20 TABLET, FILM COATED ORAL AT BEDTIME
Status: DISCONTINUED | OUTPATIENT
Start: 2020-08-23 | End: 2020-08-24 | Stop reason: HOSPADM

## 2020-08-23 RX ORDER — DEXTROSE MONOHYDRATE 25 G/50ML
50 INJECTION, SOLUTION INTRAVENOUS ONCE
Status: COMPLETED | OUTPATIENT
Start: 2020-08-23 | End: 2020-08-23

## 2020-08-23 RX ORDER — ACETAMINOPHEN 650 MG/1
650 SUPPOSITORY RECTAL EVERY 4 HOURS PRN
Status: DISCONTINUED | OUTPATIENT
Start: 2020-08-23 | End: 2020-08-24 | Stop reason: HOSPADM

## 2020-08-23 RX ORDER — ONDANSETRON 4 MG/1
4 TABLET, ORALLY DISINTEGRATING ORAL EVERY 6 HOURS PRN
Status: DISCONTINUED | OUTPATIENT
Start: 2020-08-23 | End: 2020-08-24 | Stop reason: HOSPADM

## 2020-08-23 RX ORDER — ACETAMINOPHEN 325 MG/1
650 TABLET ORAL EVERY 4 HOURS PRN
Status: DISCONTINUED | OUTPATIENT
Start: 2020-08-23 | End: 2020-08-24 | Stop reason: HOSPADM

## 2020-08-23 RX ORDER — SODIUM CHLORIDE, SODIUM LACTATE, POTASSIUM CHLORIDE, CALCIUM CHLORIDE 600; 310; 30; 20 MG/100ML; MG/100ML; MG/100ML; MG/100ML
INJECTION, SOLUTION INTRAVENOUS CONTINUOUS
Status: DISCONTINUED | OUTPATIENT
Start: 2020-08-23 | End: 2020-08-24 | Stop reason: HOSPADM

## 2020-08-23 RX ORDER — NALOXONE HYDROCHLORIDE 0.4 MG/ML
.1-.4 INJECTION, SOLUTION INTRAMUSCULAR; INTRAVENOUS; SUBCUTANEOUS
Status: DISCONTINUED | OUTPATIENT
Start: 2020-08-23 | End: 2020-08-24 | Stop reason: HOSPADM

## 2020-08-23 RX ORDER — FLUOCINONIDE 0.5 MG/G
CREAM TOPICAL 2 TIMES DAILY PRN
COMMUNITY
End: 2021-06-08

## 2020-08-23 RX ORDER — CLOPIDOGREL BISULFATE 75 MG/1
75 TABLET ORAL DAILY
Status: DISCONTINUED | OUTPATIENT
Start: 2020-08-23 | End: 2020-08-24 | Stop reason: HOSPADM

## 2020-08-23 RX ADMIN — ATORVASTATIN CALCIUM 20 MG: 20 TABLET, FILM COATED ORAL at 21:38

## 2020-08-23 RX ADMIN — DEXTROSE MONOHYDRATE 50 ML: 25 INJECTION, SOLUTION INTRAVENOUS at 01:04

## 2020-08-23 RX ADMIN — CLOPIDOGREL BISULFATE 75 MG: 75 TABLET ORAL at 09:35

## 2020-08-23 RX ADMIN — DEXTROSE AND SODIUM CHLORIDE: 5; 900 INJECTION, SOLUTION INTRAVENOUS at 04:02

## 2020-08-23 RX ADMIN — ASPIRIN 325 MG: 325 TABLET, COATED ORAL at 19:51

## 2020-08-23 RX ADMIN — SODIUM CHLORIDE 500 ML: 9 INJECTION, SOLUTION INTRAVENOUS at 02:37

## 2020-08-23 RX ADMIN — SODIUM CHLORIDE, POTASSIUM CHLORIDE, SODIUM LACTATE AND CALCIUM CHLORIDE: 600; 310; 30; 20 INJECTION, SOLUTION INTRAVENOUS at 09:35

## 2020-08-23 RX ADMIN — SODIUM CHLORIDE, POTASSIUM CHLORIDE, SODIUM LACTATE AND CALCIUM CHLORIDE: 600; 310; 30; 20 INJECTION, SOLUTION INTRAVENOUS at 19:23

## 2020-08-23 ASSESSMENT — ENCOUNTER SYMPTOMS
SHORTNESS OF BREATH: 0
NAUSEA: 0
SPEECH DIFFICULTY: 0
VOMITING: 0

## 2020-08-23 ASSESSMENT — MIFFLIN-ST. JEOR: SCORE: 1641.16

## 2020-08-23 NOTE — H&P
Admitted:     08/23/2020      CHIEF COMPLAINT:  Altered mental status due to hypoglycemia.      HISTORY OF PRESENT ILLNESS:  Mr. Garza is a 80-year-old male with a history of chronic lymphocytic leukemia, chronic kidney disease, diabetes mellitus, on oral medication.  The patient normally takes glimepiride 4 mg daily.  He reports that his dose of glimepiride was increased about 2 months ago when he stopped metformin at that time due to some diarrhea symptoms.      The patient does not routinely check his blood sugars at home.  This evening around midnight, the patient woke up and apparently told his wife he felt terrible.  He does not recall this and his wife is providing historical information for me.  His wife reportedly looked diaphoretic and had garbled speech.  He called EMS.  They arrived and per her report, blood sugar was checked and found to be 40.  He was given an amp of D50 and she states that after this the patient immediately improved to the point where he can walk to the ambulance.      Per ER report, on arrival to the ER, blood sugar again dipped into the 50s and required another amp of D50 on arrival to the ER.      Since that time, blood sugars have been within normal range and mental status and symptoms are back to normal.      On arrival to the ER, vital signs included blood pressure 120/50 with heart rate of 85.  No fever.  Saturation 96% on room air.  Workup included labs.  Creatinine 1.94.  Glucose 53 initially, improved to 160.  White cell count is 80,000 with hemoglobin 7.3 and platelet count of 111.  No imaging was done.      I spoke with Dr. Delacruz of the ER.  Plan is for admission for hypoglycemia to the observation unit.      PAST MEDICAL HISTORY:   1.  Type 2 diabetes mellitus, on glimepiride.   2.  Chronic lymphocytic leukemia.  The patient reports that his white blood cell count was 140,000 in March of this year.  He was started on a new medication and has since improved.  Most  recent white blood cell count that he reports is 120,000.   3.  Chronic kidney disease, baseline creatinine near 1.5-1.6 historically.      CURRENT MEDICATIONS:  The patient takes glimepiride, among other medicines.  Await pharmacy reconciliation medication list.  No longer takes metformin due to concerns about side effects as noted above.      ALLERGIES: ACE INHIBITORS.      FAMILY HISTORY:  Reviewed.  Nothing contributory to this admission.      SOCIAL HISTORY:  The patient denies smoking.  Occasional alcohol use.      CODE STATUS:  Full.      REVIEW OF SYSTEMS:  See HPI for details.  Comprehensive greater than 10-point review of systems is otherwise negative besides that detailed above.      PHYSICAL EXAMINATION:   VITAL SIGNS:  Blood pressure is currently 140/80 with heart rate of 93.  No fever.  Saturation 98% on room air.   GENERAL:  The patient appears nontoxic and in no acute distress.  He appears awake, alert and appears to be oriented.  Wife is answering some questions for him, but he does a fairly reasonable job of answering questions as well.   HEENT:  Head is atraumatic.  Sclerae are white.  Eyelids are normal.  Conjunctivae are normal.  Extraocular movements are intact.   NECK:  Supple.  No cervical or supraclavicular lymphadenopathy.   HEART:  Regular rate and rhythm.  No significant murmurs.  No lower extremity edema.   LUNGS:  Clear to auscultation bilaterally.  No intercostal retractions.  No conversational dyspnea.   ABDOMEN:  Nontender, nondistended.  Soft, no masses,  No organomegaly.   EXTREMITIES:  No edema.   SKIN:  Reveals no rashes.  No jaundice.  Skin is dry to touch.   NEUROLOGIC:  Cranial nerves II-XII are intact.  Moves all extremities appropriately.  Sensation intact to light touch in the upper and lower extremities bilaterally.     NEUROPSYCHIATRIC:  The patient is awake, alert and oriented x  3.      LABORATORY AND IMAGING DATA:  Reviewed as above.      IMPRESSION AND PLAN:    Greg is a 80-year-old male who presented to the hospital for altered mental status.  EMS services were called and found the patient to be hypoglycemic with blood sugars of 40.  He received an amp of D50 and shortly thereafter improved.  He was again hypoglycemic on arrival to the Emergency Room and again received an amp of D50 and improved.  Since that time, blood sugars have been stable in the mid-100 range.  Plan is for admission to the hospital for monitoring of blood sugars and treatment of hypoglycemia.   1.  Hypoglycemia:  Secondary to glimepiride in the setting of renal insufficiency.  As mentioned above, glimepiride dose was increased 2 months ago after his metformin was discontinued.  I do note that his most recent hemoglobin A1c was 5.5 in July of this year.  I suspect his glimepiride dose may be a bit generous for him, alternatively, given that it is affected by renal function perhaps glimepiride should be discontinued altogether and substituted with a medicine like glipizide.  I do note that his renal function is a bit worse than baseline, so perhaps this is causing decreased clearance of medication as well, leading to hypoglycemia this evening.   2.  Acute renal failure and chronic kidney disease:  Baseline creatinine 1.5-1.6 historically.  Currently 1.9.  Likely dehydration related.   3.  The patient reports he was recently started on allopurinol, presumably to treat elevated uric acid levels.  Unsure if this was for gout for the possibility of tumor lysis syndrome.   4.  Anemia related to chronic lymphocytic leukemia.    5.  Thrombocytopenia related to chronic lymphocytic leukemia.       PLAN:   1.  Admit to observation.   2.  Blood sugars q.1 hour.   3.  D5 normal saline IV fluids at 100 mL per hour.   4.  Recheck BMP in the morning.   5.  Check A1c.  I suspect this will be low-normal.   6.  Consider stopping glimepiride and substituting with glipizide or simply holding all diabetic medications  for now and following up with primary provider.   7.  We will check uric acid level in the setting of renal dysfunction and recent addition of allopurinol.  If uric acid elevated significantly this could be contributing to renal failure as well.         SERGIO ADAMES MD             D: 2020   T: 2020   MT: ADAMA      Name:     MARK MORA   MRN:      [id number]        Account:      [id number]   :      1940        Admitted:     2020                   Document: [id number]       cc: Sandi Eastman MD

## 2020-08-23 NOTE — ED NOTES
"[facility]  ED Nurse Handoff Report    Austin Garza is a 80 year old male   ED Chief complaint: Hypoglycemia  . ED Diagnosis:   Final diagnoses:   Hypoglycemia     Allergies:   Allergies   Allergen Reactions     Ace Inhibitors      cough       Code Status: Full Code  Activity level - Baseline/Home:  Independent. Activity Level - Current:   Stand by Assist. Lift room needed: No. Bariatric: No   Needed: No   Isolation: No. Infection: Not Applicable.     Vital Signs:   Vitals:    08/23/20 0120 08/23/20 0130 08/23/20 0200 08/23/20 0230   BP:  136/67 134/74 (!) 142/78   Pulse: 87 89 90 93   Resp: 17 14 15 13   Temp:       TempSrc:       SpO2: 95% 98% 98% 98%   Weight:           Cardiac Rhythm:  ,      Pain level:    Patient confused: No. Patient Falls Risk: Yes.   Elimination Status: Pt has not voided in ED   Patient Report - Initial Complaint: hypogylcemia. Focused Assessment: Pt is a type 2 diabetic and has episode where he became \"unresponsive\" at home with wife. Pt was unable to answer questions appropriately for wife so EMS was called. Upon arrival EMS found pt to be minimally responsive and would moan in response to painful stimuli. BG on scene was 40 so EMS gave 150 of D10, with a recheck of BG of 136. BG when arriving to ED was 67 for EMS. Pt's BG was then checked again upon arrival to ED staff and was found to be 53. Pt takes glimepiride for his diabetes. Pt has been NSR in the ED. Pt has h/o of leukemia and is currently taking medication for it. Pt has been alert and oriented in the ED.   Tests Performed: lab work,. Abnormal Results:   Abnormal Labs Reviewed   CBC WITH PLATELETS DIFFERENTIAL - Abnormal; Notable for the following components:       Result Value    WBC 80.2 (*)     RBC Count 2.21 (*)     Hemoglobin 7.3 (*)     Hematocrit 25.2 (*)      (*)     MCHC 29.0 (*)     RDW 18.7 (*)     Platelet Count 111 (*)     Absolute Lymphocytes 75.4 (*)     All other components " within normal limits   BASIC METABOLIC PANEL - Abnormal; Notable for the following components:    Glucose 187 (*)     Urea Nitrogen 37 (*)     Creatinine 1.94 (*)     GFR Estimate 32 (*)     GFR Estimate If Black 37 (*)     Calcium 7.6 (*)     All other components within normal limits   GLUCOSE BY METER - Abnormal; Notable for the following components:    Glucose 53 (*)     All other components within normal limits   GLUCOSE BY METER - Abnormal; Notable for the following components:    Glucose 166 (*)     All other components within normal limits   GLUCOSE BY METER - Abnormal; Notable for the following components:    Glucose 178 (*)     All other components within normal limits   .   Treatments provided: D50 and 500 ml bolus NS  Family Comments: Wife at bedside and aware of plan of care  OBS brochure/video discussed/provided to patient:  N/A  ED Medications:   Medications   0.9% sodium chloride BOLUS (500 mLs Intravenous New Bag 8/23/20 0237)   dextrose 50 % injection 50 mL (50 mLs Intravenous Given 8/23/20 0104)     Drips infusing:  Yes  For the majority of the shift, the patient's behavior Green. Interventions performed were n/a.    Sepsis treatment initiated: No       ED Nurse Name/Phone Number: Jelena Reyes RN,   2:45 AM    RECEIVING UNIT ED HANDOFF REVIEW    Above ED Nurse Handoff Report was reviewed: Yes  Reviewed by: Elaine Li RN on August 23, 2020 at 2:53 AM

## 2020-08-23 NOTE — PROGRESS NOTES
Patient was admitted to medicine service for symptomatic hypoglycemia.  Also noted to have a mild acute kidney injury.    Patient is to be on metformin and glimepiride for his diabetes.  But due to new onset of diarrhea his metformin was stopped in the outpatient setting.  It was stopped in June.  The day after stopping the metformin, his diarrhea resolved.  Interestingly he had been taking the metformin for many years without any issue.    After stopping the metformin his glimepiride dose was increased.  He used to be on 1 mg daily but it was increased to 4 mg daily.  In July the dose was increased to 8 mg daily but he started having hypoglycemia so it was decreased back to 4 mg daily.    He does not check his glucose on a regular basis.  He has not checked his glucose at home for almost 2 weeks, maybe longer.    He was admitted for symptomatic hypoglycemia.    Hypoglycemia  - Continue glucose monitoring.  - Can change to every 2 hours fingerstick glucose monitoring.  - Blood glucose numbers are quite stable.  - Stop dextrose containing fluid.  Continue lactated Ringer.  - If remains in a good range then change fingerstick glucose monitoring to every 4 hour.  -Possible discharge tomorrow.    Diabetes  -Hemoglobin A1c is 5.6 but hard to interpret that in the setting of his chronic anemia.  His hemoglobin usually runs between 7-8.  -Recommended to monitor the blood glucose at home which will be a more accurate reflection of his diabetes control.  - Possibly resume glimepiride at the time of discharge at a lower dose such as 1 mg or 2 mg daily.    Acute kidney injury  - Continue to hold losartan/HCTZ.  -Repeat renal panel in the morning.    CLL  -Resume ibrutinib, wife brought the pills in.    Peripheral arterial disease  -Resume aspirin, Plavix, Lipitor as before.    Plan discussed with the patient and wife.

## 2020-08-23 NOTE — PROGRESS NOTES
Pharmacy dropped off pt home chemo med to 3rd floor. Writer just called 5th floor asking for chemo RN to come administer. Pharmacist says they will be available as second RN when needed. Pt med is locked in lock box in pt room, pt aware.     Addnedum 1455: 2 RNs at bedside administering medication now.

## 2020-08-23 NOTE — PROVIDER NOTIFICATION
"MD paged: \"1200 blood sugar: 142 . Next check at 1400, I will plan to page you w/ that sugar.\"  "

## 2020-08-23 NOTE — PLAN OF CARE
Writer took over cares [id number]. A/O x 4. VSS on RA. Denied pain.  and 122, MD paged. No tele, denied CP . LS clear, no SOB reported. PIV to left intact, infusing w/ LR @ 100 ml/hr. Up SBA, reported one BM this shift. Good PO intake regular diet. Will continue POC.     PRIMARY DIAGNOSIS: HYPO/HYPERGLYCEMIA    OUTPATIENT/OBSERVATION GOALS TO BE MET BEFORE DISCHARGE  1. BG greater than 100 and less than 250 on two consecutive readings: Yes  Recent Labs   Lab Test 08/23/20  1409 08/23/20  1202 08/23/20  1000   * 142* 217*       2. Ketones absent from urine  (hyperglycemia): Yes    3. Tolerating oral intake to maintain hydration: Yes    4. Return to near baseline physical activity: Yes    Discharge Planner Nurse   Safe discharge environment identified: Yes  Barriers to discharge: Yes       Entered by: Priya Barreto 08/23/2020 2:55 PM     Please review provider order for any additional goals.   Nurse to notify provider when observation goals have been met and patient is ready for discharge.

## 2020-08-23 NOTE — ED PROVIDER NOTES
History     Chief Complaint:  Hypoglycemia      HPI   Austin Garza is a 80 year old male with a complicated medical history including BCC, HTN, DM type II, CKD, AAA, and CLL currently on Imbruvica therapy who presents via EMS for evaluation of hypoglycemia.  Per EMS, patient became unresponsive at home and wife subsequently called 911.  On scene, patient was not responding appropriately and would only moan in response to painful stimuli.  Initial blood glucose was 40 and he was given 150 mL of dextrose 10% which increased his glucose to 136.  On arrival, patient's blood glucose was 67.  Patient states he does not remember the event woke up with EMS personnel around him.  He states the past few days he has been working in the yard to an extent that would tire him out.  He notes he started taking allopurinol 2 weeks ago.  He also complains of myalgias and several episodes of cramping in his left leg over the past few weeks.  He states he has been eating and drinking normally.  He denies chest pain, shortness of breath, nausea, vomiting, speech difficulty, vision changes.  Of note, patient admits to drinking an [place] and a glass of wine tonight.    Allergies:  ACE inhibitors    Medications:    Allopurinol  Aspirin 81 mg  Atorvastatin  Plavix  Amaryl  Imbruvica  Hyzaar    Past Medical History:    AAA  BCC  CLL  Diaphragmatic hernia  Diverticulitis  GERD  HTN  Hyperlipidemia  IBS  Macular degeneration of retina  DM type II  CKD stage III  Anemia  Hearing loss    Past Surgical History:    Appendectomy  Bone marrow biopsy  AAA endovascular repair  Fistulotomy   Visceral embolization  Multiple skin tags resection  SCC resection, back    Family History:    CVD - father  HTN - mother  CAD - mother  Skin cancer - mother  Glaucoma - mother    Social History:  Smoking status: former somker  Alcohol use: yes  Drug use: no  The patient presents to the emergency department via EMS.  PCP: Sandi Eastman  Marital Status:       Review of Systems   Eyes: Negative for visual disturbance.   Respiratory: Negative for shortness of breath.    Cardiovascular: Negative for chest pain.   Gastrointestinal: Negative for nausea and vomiting.   Neurological: Positive for syncope. Negative for speech difficulty.   All other systems reviewed and are negative.      Physical Exam     Patient Vitals for the past 24 hrs:   BP Temp Temp src Pulse Resp SpO2 Weight   08/23/20 0230 (!) 142/78 -- -- 93 13 98 % --   08/23/20 0200 134/74 -- -- 90 15 98 % --   08/23/20 0130 136/67 -- -- 89 14 98 % --   08/23/20 0120 -- -- -- 87 17 95 % --   08/23/20 0100 126/68 -- -- 88 -- 95 % --   08/23/20 0058 128/58 97.6  F (36.4  C) Oral 85 18 96 % 93 kg (205 lb)     Physical Exam    Gen: alert  HEENT: PERRL, oropharynx clear  Neck: normal ROM  CV: RRR, no murmurs  Pulm: breath sounds equal, lungs clear  Abd: Soft, nontender  Back: no evidence of injury, no cva tenderness  MSK: no deformity, moves all extremities  Skin: no rash  Neuro: alert, appropriate conversation and interaction      Emergency Department Course   ECG (1:11:14):  Rate 88 bpm. MS interval 238. QRS duration 84. QT/QTc 374/452. P-R-T axes 42 -1 -8. Sinus rhythm with first-degree AV block.  Minimal voltage criteria for LVH, may be normal variant.  Inferior infarct, age undetermined.  Abnormal ECG. Interpreted at 130 by Selina Delacruz MD.    Laboratory:  CBC: WBC: 80.2 (HH), HGB: 7.3 (L), PLT: 111 (L)  BMP: Glucose 187 (H), Urea nitrogen 37 (H), GFR 32 (L), Calcium 7.6 (L), o/w WNL (Creatinine: 1.94 (H))  106 Troponin: <0.015  0052 Glucose by meter: 53 (L)  106 Glucose by meter: 187 (H)  132 Glucose by meter: 166 (H)  233 Glucose by meter: 178 (H)  Asymptomatic COVID 19 PCR: Pending    Interventions:  104 dextrose 50% 50 mL IV  237  mL IV    Emergency Department Course:  Nursing notes and vitals reviewed. (144) I performed an exam of the patient as documented above.     IV  inserted. Medicine administered as documented above. Blood drawn. COVID swab obtained. This was sent to the lab for further testing, results above.     An EKG was recorded. Results as noted above.     200 I rechecked the patient and discussed the results of his workup thus far.     211  I consulted with Dr. Bishop of the hospitalist services. He is in agreement to accept the patient for admission.    Findings and plan explained to the Patient who consents to admission. Discussed the patient with Dr. Bishop, who will admit the patient to an obs bed for further monitoring, evaluation, and treatment.    Impression & Plan    Medical Decision Making:  Patient presents emergency department after an episode of decreased level of consciousness.  Patient found to be hypoglycemic.  He required D10 prehospital and D50 here to normalize his blood sugar.  He has had a meal.  No recurrent hypoglycemia.  Patient is taking a sulfonylurea.  No change in dosing however renal function is slightly worse than baseline.  He will need admission for monitoring of his blood sugar given the long-acting nature of the sulfonylurea.      Covid-19  Austin Garza was evaluated during a global COVID-19 pandemic, which necessitated consideration that the patient might be at risk for infection with the SARS-CoV-2 virus that causes COVID-19.   Applicable protocols for evaluation were followed during the patient's care.   COVID-19 was considered as part of the patient's evaluation. The plan for testing is:  a test was obtained during this visit.  Diagnosis:    ICD-10-CM    1. Hypoglycemia  E16.2 CBC + differential     Glucose by meter     Glucose by meter       Disposition:  Admitted to the hospital  Pavan Morgan  8/23/2020   [facility] EMERGENCY DEPARTMENT  Scribe Disclosure:  I, Pavan Morgan, am serving as a scribe at 1:44 AM on 8/23/2020 to document services personally performed by Selina Delacruz MD based on my  observations and the provider's statements to me.        Selina Delacruz MD  08/23/20 0734

## 2020-08-23 NOTE — PHARMACY-ADMISSION MEDICATION HISTORY
.Admission medication history interview status for this patient is complete. See EPIC admission navigator for allergy information, prior to admission medications and immunization status.     Medication history interview done via telephone during Covid-19 pandemic, indicate source(s): Patient  Medication history resources (including written lists, pill bottles, clinic record):retail pharmacy  Pharmacy: [facility]  Changes made to PTA medication list:  Added: none  Deleted: hyzaar 100-25 daily  Changed: hyzaar 50-12.5 po BID, aspirin 81 to 325mg     Actions taken by pharmacist (provider contacted, etc):Phoned [facility] to get most recent RX for hyzaar.  Patient is using this med 50-12.5 BID instead of daily as prescribed, but he did state that his provider is aware of this and using this dose twice daily is what works to control is BP.     Additional medication history information:None    Medication reconciliation/reorder completed by provider prior to medication history?  y   (Y/N)     For patients on insulin therapy: n  (Y/N)      Prior to Admission medications    Medication Sig Last Dose Taking? Auth Provider   allopurinol (ZYLOPRIM) 300 MG tablet Take 1 tablet (300 mg) by mouth daily  Patient taking differently: Take 300 mg by mouth every evening  8/22/2020 at pm Yes Breana Perry MD   aspirin (ASA) 81 MG tablet Take 1 tablet (81 mg) by mouth every evening  Patient taking differently: Take 325 mg by mouth daily  8/22/2020 at am Yes Radha Peoples APRN CNP   atorvastatin (LIPITOR) 20 MG tablet Take 1 tablet (20 mg) by mouth At Bedtime 8/22/2020 at pm Yes Radha Peoples APRN CNP   clopidogrel (PLAVIX) 75 MG tablet TAKE 1 TABLET BY MOUTH DAILY 8/22/2020 at am Yes Sandi Eastman MD   fluocinonide (LIDEX) 0.05 % external cream Apply topically 2 times daily as needed Itchy bug bites. Use for up to 2 weeks at a time. Past Week at Unknown time Yes Unknown, Entered By History   glimepiride (AMARYL)  4 MG tablet Take 1 tablet (4 mg) by mouth every morning (before breakfast) 8/22/2020 at am Yes Sandi Eastman MD   ibrutinib (IMBRUVICA) 420 MG tablet Take 1 tablet (420 mg) by mouth daily 8/22/2020 at am Yes Breana Perry MD   ibrutinib (IMBRUVICA) 420 MG tablet Take 1 tablet (420 mg) by mouth daily 8/22/2020 at am Yes Breana Perry MD   losartan-hydrochlorothiazide (HYZAAR) 50-12.5 MG tablet Take 1 tablet by mouth 2 times daily 8/22/2020 at pm Yes Unknown, Entered By History   Multiple Vitamins-Minerals (CENTRUM SILVER) per tablet Take 1 tablet by mouth daily 8/22/2020 at am Yes Reported, Patient   ibrutinib (IMBRUVICA) 420 MG tablet Take 1 tablet (420 mg) by mouth daily   Teodoor Sierra MD   ibrutinib (IMBRUVICA) 420 MG tablet Take 1 tablet (420 mg) by mouth daily   Teodoro Sierra MD   ibrutinib (IMBRUVICA) 420 MG tablet Take 1 tablet (420 mg) by mouth daily   Breana Perry MD

## 2020-08-23 NOTE — PROGRESS NOTES
PRIMARY DIAGNOSIS: HYPOGLYCEMIA    OUTPATIENT/OBSERVATION GOALS TO BE MET BEFORE DISCHARGE  1. BG greater than 100 and less than 250 on two consecutive readings: Yes  Recent Labs   Lab Test 08/23/20  1000 08/23/20  0759 08/23/20  0711   * 177* 194*       2. Ketones absent from urine  (hyperglycemia): Yes    3. Tolerating oral intake to maintain hydration: Yes    4. Return to near baseline physical activity: Yes    Discharge Planner Nurse   Safe discharge environment identified: Yes  Barriers to discharge: Yes       Entered by: Tonya Nair 08/23/2020 11:02 AM     Please review provider order for any additional goals.   Nurse to notify provider when observation goals have been met and patient is ready for discharge.

## 2020-08-23 NOTE — ED TRIAGE NOTES
Pt arrives via EMS from home after wife called EMS for pt being unresponsive. Per EMS, pt was not responding appropriately on scene and would only moan in response to painful stimuli. EMS states initial BG was 40 on scene so they gave 150 D10 which perked pt up. BG recheck was then 136. The last BG recheck when pulling into the ER was 67. BG here for us was 53. EMS states they had pt eat a cookie and have some orange juice prior to leaving. Pt has h/o of leukemia and currently takes glimepiride for type 2 diabetes.

## 2020-08-24 VITALS
SYSTOLIC BLOOD PRESSURE: 146 MMHG | DIASTOLIC BLOOD PRESSURE: 68 MMHG | TEMPERATURE: 97.3 F | BODY MASS INDEX: 30.39 KG/M2 | OXYGEN SATURATION: 96 % | WEIGHT: 205.2 LBS | HEART RATE: 88 BPM | RESPIRATION RATE: 16 BRPM | HEIGHT: 69 IN

## 2020-08-24 LAB
ANION GAP SERPL CALCULATED.3IONS-SCNC: 4 MMOL/L (ref 3–14)
ANISOCYTOSIS BLD QL SMEAR: ABNORMAL
BASOPHILS # BLD AUTO: 0 10E9/L (ref 0–0.2)
BASOPHILS NFR BLD AUTO: 0 %
BUN SERPL-MCNC: 26 MG/DL (ref 7–30)
CALCIUM SERPL-MCNC: 8 MG/DL (ref 8.5–10.1)
CHLORIDE SERPL-SCNC: 108 MMOL/L (ref 94–109)
CO2 SERPL-SCNC: 25 MMOL/L (ref 20–32)
CREAT SERPL-MCNC: 1.65 MG/DL (ref 0.66–1.25)
DACRYOCYTES BLD QL SMEAR: SLIGHT
DIFFERENTIAL METHOD BLD: ABNORMAL
EOSINOPHIL # BLD AUTO: 0 10E9/L (ref 0–0.7)
EOSINOPHIL NFR BLD AUTO: 0 %
ERYTHROCYTE [DISTWIDTH] IN BLOOD BY AUTOMATED COUNT: 18.7 % (ref 10–15)
GFR SERPL CREATININE-BSD FRML MDRD: 39 ML/MIN/{1.73_M2}
GLUCOSE BLDC GLUCOMTR-MCNC: 103 MG/DL (ref 70–99)
GLUCOSE BLDC GLUCOMTR-MCNC: 115 MG/DL (ref 70–99)
GLUCOSE BLDC GLUCOMTR-MCNC: 193 MG/DL (ref 70–99)
GLUCOSE BLDC GLUCOMTR-MCNC: 85 MG/DL (ref 70–99)
GLUCOSE SERPL-MCNC: 116 MG/DL (ref 70–99)
HCT VFR BLD AUTO: 25.2 % (ref 40–53)
HGB BLD-MCNC: 7.3 G/DL (ref 13.3–17.7)
INTERPRETATION ECG - MUSE: NORMAL
LYMPHOCYTES # BLD AUTO: 75.4 10E9/L (ref 0.8–5.3)
LYMPHOCYTES NFR BLD AUTO: 94 %
MACROCYTES BLD QL SMEAR: PRESENT
MCH RBC QN AUTO: 33 PG (ref 26.5–33)
MCHC RBC AUTO-ENTMCNC: 29 G/DL (ref 31.5–36.5)
MCV RBC AUTO: 114 FL (ref 78–100)
MONOCYTES # BLD AUTO: 0 10E9/L (ref 0–1.3)
MONOCYTES NFR BLD AUTO: 0 %
NEUTROPHILS # BLD AUTO: 4.8 10E9/L (ref 1.6–8.3)
NEUTROPHILS NFR BLD AUTO: 6 %
OVALOCYTES BLD QL SMEAR: SLIGHT
PLATELET # BLD AUTO: 111 10E9/L (ref 150–450)
PLATELET # BLD EST: ABNORMAL 10*3/UL
POIKILOCYTOSIS BLD QL SMEAR: SLIGHT
POTASSIUM SERPL-SCNC: 4 MMOL/L (ref 3.4–5.3)
RBC # BLD AUTO: 2.21 10E12/L (ref 4.4–5.9)
SARS-COV-2 RNA SPEC QL NAA+PROBE: NOT DETECTED
SODIUM SERPL-SCNC: 137 MMOL/L (ref 133–144)
SPECIMEN SOURCE: NORMAL
WBC # BLD AUTO: 80.2 10E9/L (ref 4–11)

## 2020-08-24 PROCEDURE — 99217 ZZC OBSERVATION CARE DISCHARGE: CPT | Performed by: INTERNAL MEDICINE

## 2020-08-24 PROCEDURE — 00000146 ZZHCL STATISTIC GLUCOSE BY METER IP

## 2020-08-24 PROCEDURE — 36415 COLL VENOUS BLD VENIPUNCTURE: CPT | Performed by: INTERNAL MEDICINE

## 2020-08-24 PROCEDURE — G0378 HOSPITAL OBSERVATION PER HR: HCPCS

## 2020-08-24 PROCEDURE — 80048 BASIC METABOLIC PNL TOTAL CA: CPT | Performed by: INTERNAL MEDICINE

## 2020-08-24 PROCEDURE — 25800030 ZZH RX IP 258 OP 636: Performed by: INTERNAL MEDICINE

## 2020-08-24 PROCEDURE — 25000132 ZZH RX MED GY IP 250 OP 250 PS 637: Performed by: INTERNAL MEDICINE

## 2020-08-24 RX ADMIN — CLOPIDOGREL BISULFATE 75 MG: 75 TABLET ORAL at 09:14

## 2020-08-24 RX ADMIN — SODIUM CHLORIDE, POTASSIUM CHLORIDE, SODIUM LACTATE AND CALCIUM CHLORIDE: 600; 310; 30; 20 INJECTION, SOLUTION INTRAVENOUS at 04:46

## 2020-08-24 NOTE — DISCHARGE SUMMARY
[facility]    Discharge Summary  Hospitalist    Date of Admission:  8/23/2020  Date of Discharge:  8/24/2020 12:57 PM  Discharging Provider: Robert Faria MD  Date of Service (when I saw the patient): 08/24/20    Discharge Diagnoses   Symptomatic hypoglycemia.  Mild acute kidney injury in the setting of chronic kidney disease.  History of diabetes.   CLL.  Peripheral arterial disease.    History of Present Illness   Austin Garza is an 80 year old male who presented with altered mental status and hypoglycemia.    Hospital Course     Hypoglycemia.  Due to oral hypoglycemic medication.    Patient was admitted to internal medicine service for symptomatic hypoglycemia.  Treated with dextrose containing IV fluid.  Regular diet.  Patient's hypoglycemia rapidly resolved.    Currently he is not requiring any dextrose containing IV fluid.  Blood glucose numbers are fairly stable.  This morning the blood sugar was 116.  At lunch it was 193.    For now, I have recommended stopping the glimepiride altogether.    Patient used to be on metformin and glimepiride for his diabetes.    Metformin was stopped in June due to acute diarrhea.  Diarrhea resolved after stopping the metformin.     After stopping the metformin his glimepiride dose was increased.  He used to be on 1 mg daily but it was increased to 4 mg daily.  In July the dose was increased to 8 mg daily but he started having hypoglycemia so it was decreased back to 4 mg daily.    He usually does not check his blood sugar at home.  His hemoglobin A1c was checked and it was only 5.6.  But patient also has CLL with chronically low hemoglobin at baseline since difficult to interpret the hemoglobin A1c as it may not reflect his diabetes control.    For that reason I have recommended that he should check his glucose on a regular basis at home.  He assured me that he will will do that.  Then he should discuss those numbers with his primary care physician regarding  resuming his diabetes medications in the outpatient setting.    Acute kidney injury.  His creatinine was 1.9 at admission.  After IV fluid hydration it is down to 1.6 which is his baseline.  For now, I have advised him to stop his losartan/HCTZ medicine for blood pressure.  But I did tell him that the a.m. should be to resume his blood pressure medicine as soon as possible, as long as his kidney numbers are stable.  Explained that losartan is a good medicine for his blood pressure control in the setting of diabetes.  He verbalized understanding.    Recommended to get his basic metabolic panel checked later this week.  If creatinine is stable then losartan can be resumed.    His other home medications were continued as before.    Follow-up with primary care physician, and with his oncologist.  Wife is in the room and updated.    I personally evaluated and examined the patient on the day of discharge.    Robert Faria MD      Pending Results   These results will be followed up by PCP  Unresulted Labs Ordered in the Past 30 Days of this Admission     No orders found for last 31 day(s).               Primary Care Physician   Sandi Nguyen        Discharge Disposition   Discharged to home  Condition at discharge: Stable    Consultations This Hospital Stay   None    Time Spent on this Encounter   Discharge time: greater than 30 minutes.    Discharge Orders      Basic metabolic panel     Follow-up and recommended labs and tests     Follow up with primary care provider, Sandi Nguyen, within 7 days for hospital follow- up.  The following labs/tests are recommended: BMP.     Reason for your hospital stay    HYPOglycemia. Acute kidney injury     Activity    Your activity upon discharge: activity as tolerated     Monitor and record    blood glucose 4 times a day, before meals and at bedtime  blood pressure daily     Discharge Instructions    Stop glimepiride. Monitor blood glucose at home on daily basis. If blood glucose is  running high then the glimepiride may need to be restarted, but talk to your primary care doctor first.  Also stop Losartan/HCTZ for now (blood pressure medicine) but it should be resumed soon, as long as the kidney lab test is stable (to be checked later this week- BMP).     Diet    Follow this diet upon discharge: Orders Placed This Encounter      Regular Diet Adult     Discharge Medications   Discharge Medication List as of 8/24/2020 11:15 AM      CONTINUE these medications which have NOT CHANGED    Details   allopurinol (ZYLOPRIM) 300 MG tablet Take 1 tablet (300 mg) by mouth daily, Disp-30 tablet,R-1, E-Prescribe      aspirin (ASA) 81 MG tablet Take 1 tablet (81 mg) by mouth every evening, Disp-90 tablet, R-3, E-Prescribe      atorvastatin (LIPITOR) 20 MG tablet Take 1 tablet (20 mg) by mouth At Bedtime, Disp-90 tablet, R-3, E-Prescribe      clopidogrel (PLAVIX) 75 MG tablet TAKE 1 TABLET BY MOUTH DAILY, Disp-90 tablet,R-1, E-Prescribe      fluocinonide (LIDEX) 0.05 % external cream Apply topically 2 times daily as needed Itchy bug bites. Use for up to 2 weeks at a time.Historical      !! ibrutinib (IMBRUVICA) 420 MG tablet Take 1 tablet (420 mg) by mouth daily, Disp-28 tablet,R-0, E-Prescribe      Multiple Vitamins-Minerals (CENTRUM SILVER) per tablet Take 1 tablet by mouth daily, Historical      !! ibrutinib (IMBRUVICA) 420 MG tablet Take 1 tablet (420 mg) by mouth daily, Disp-28 tablet,R-0, E-Prescribe      !! ibrutinib (IMBRUVICA) 420 MG tablet Take 1 tablet (420 mg) by mouth daily, Disp-28 tablet,R-0, E-Prescribe      !! ibrutinib (IMBRUVICA) 420 MG tablet Take 1 tablet (420 mg) by mouth daily, Disp-28 tablet,R-0, E-Prescribe      !! ibrutinib (IMBRUVICA) 420 MG tablet Take 1 tablet (420 mg) by mouth daily, Disp-28 tablet,R-0, E-Prescribe       !! - Potential duplicate medications found. Please discuss with provider.      STOP taking these medications       glimepiride (AMARYL) 4 MG tablet Comments:    Reason for Stopping:         losartan-hydrochlorothiazide (HYZAAR) 50-12.5 MG tablet Comments:   Reason for Stopping:             Allergies   Allergies   Allergen Reactions     Ace Inhibitors      cough     Data   Most Recent 3 CBC's:  Recent Labs   Lab Test 08/23/20  0106 08/11/20  1000 07/14/20  1002   WBC 80.2* 114.4* 142.6*   HGB 7.3* 8.1* 8.6*   * 113* 114*   * 122* 119*      Most Recent 3 BMP's:  Recent Labs   Lab Test 08/24/20  0624 08/23/20  0633 08/23/20  0106    136 137   POTASSIUM 4.0 4.6 3.4   CHLORIDE 108 108 106   CO2 25 26 24   BUN 26 36* 37*   CR 1.65* 1.75* 1.94*   ANIONGAP 4 2* 7   MAGDALENO 8.0* 7.9* 7.6*   * 212* 187*     Most Recent 2 LFT's:  Recent Labs   Lab Test 07/14/20  1002 06/18/20  1017   AST 17 16   ALT 21 18   ALKPHOS 40 39*   BILITOTAL 0.9 0.8     Most Recent INR's and Anticoagulation Dosing History:  Anticoagulation Dose History     Recent Dosing and Labs Latest Ref Rng & Units 3/11/2019 5/13/2019    INR 0.86 - 1.14 1.03 1.08        Most Recent 3 Troponin's:  Recent Labs   Lab Test 08/23/20  0106   TROPI <0.015     Most Recent Cholesterol Panel:  Recent Labs   Lab Test 03/11/19  0700   CHOL 104   LDL 52   HDL 33*   TRIG 97     Most Recent 6 Bacteria Isolates From Any Culture (See EPIC Reports for Culture Details):  Recent Labs   Lab Test 05/29/19  0555 12/14/17  1429   CULT No growth No growth     Most Recent TSH, T4 and A1c Labs:  Recent Labs   Lab Test 08/23/20  0106  12/31/19  0900   TSH  --   --  3.57   A1C 5.6   < > 6.3*    < > = values in this interval not displayed.

## 2020-08-24 NOTE — PLAN OF CARE
PRIMARY DIAGNOSIS: HYPO/HYPERGLYCEMIA    OUTPATIENT/OBSERVATION GOALS TO BE MET BEFORE DISCHARGE  BG greater than 100 and less than 250 on two consecutive readings: Yes  Recent Labs   Lab Test 08/24/20  0534 08/24/20  0437 08/24/20  0148   * 85 115*         Ketones absent from urine  (hyperglycemia): N/A    Tolerating oral intake to maintain hydration: Yes    Return to near baseline physical activity: Yes    Discharge Planner Nurse   Safe discharge environment identified: Yes  Barriers to discharge: No       Entered by: Breana Bustamante 08/24/2020 5:45 AM     Please review provider order for any additional goals.   Nurse to notify provider when observation goals have been met and patient is ready for discharge.

## 2020-08-24 NOTE — PROGRESS NOTES
PRIMARY DIAGNOSIS: HYPO/HYPERGLYCEMIA    OUTPATIENT/OBSERVATION GOALS TO BE MET BEFORE DISCHARGE  1. BG greater than 100 and less than 250 on two consecutive readings: Yes  Recent Labs   Lab Test 08/24/20  0534 08/24/20  0437 08/24/20  0148   * 85 115*       2. Ketones absent from urine  (hyperglycemia): NA    3. Tolerating oral intake to maintain hydration: Yes    4. Return to near baseline physical activity: Yes    Discharge Planner Nurse   Safe discharge environment identified: Yes  Barriers to discharge: No       Entered by: Tonya Nair 08/24/2020 10:50 AM     Please review provider order for any additional goals.   Nurse to notify provider when observation goals have been met and patient is ready for discharge.

## 2020-08-24 NOTE — PLAN OF CARE
Patient discharged home via private car, transported via wheelchair with support staff.  Patient verbalized and received copy of discharge instructions.  Personal belongings gathered and sent with patient.  VSS. IV removed prior to discharge.

## 2020-08-24 NOTE — PLAN OF CARE
Admitting Diagnosis: Hypoglycemia  Pertinent History: Chronic Lymphocytotic leukemia, CKD, Diabetes mellitus on oral medication.  For vital signs and assessment please see flow sheet.  Living Situation: live with spouse   Pain plan: denies pain   Mobility: assistance, stand-by  Baseline activity: Independent  Alarms/Safety: BA  LDA's: PIV  Pertinent test results: CR: 1.75, K: 4.6, PLT: 111, HGB: 7.3,  , 100, 198, 198.  Consults: none  Abnormals/Pending: none.  Other Cares/Comments: A & O x4, VSS, SBA, OBS status, Vs Q 4 hrs, o2 97% RA,  ml/hr LR.    Discharge Disposition: medically active  Discharge Time: Medically active.

## 2020-08-24 NOTE — PLAN OF CARE
"PRIMARY DIAGNOSIS: \"GENERIC\" NURSING  OUTPATIENT/OBSERVATION GOALS TO BE MET BEFORE DISCHARGE:  ADLs back to baseline: Yes    Activity and level of assistance: Up with standby assistance.    Pain status: Pain free.    Return to near baseline physical activity: Yes     Discharge Planner Nurse   Safe discharge environment identified: Yes  Barriers to discharge: Yes  Medically active.       Entered by: Delfino Gomez 08/23/2020 8:01 PM     Please review provider order for any additional goals.   Nurse to notify provider when observation goals have been met and patient is ready for discharge.  "

## 2020-08-25 ENCOUNTER — TELEPHONE (OUTPATIENT)
Dept: PEDIATRICS | Facility: CLINIC | Age: 80
End: 2020-08-25

## 2020-08-25 NOTE — TELEPHONE ENCOUNTER
Please contact patient for In-patient follow up.  [phone] (home)     Visit date: [id number]  Diagnosis listed: Hypoglycemia, Type 2 Diabetes Mellitus Without Complication, Without Long-Term Current Use Of Insulin (H)  Number of visits in past 12 months: 0 ED / 0 IP

## 2020-08-25 NOTE — TELEPHONE ENCOUNTER
ED Notes:  His creatinine was 1.9 at admission.  After IV fluid hydration it is down to 1.6 which is his baseline.  For now, I have advised him to stop his losartan/HCTZ medicine for blood pressure.  But I did tell him that the a.m. should be to resume his blood pressure medicine as soon as possible, as long as his kidney numbers are stable.  Explained that losartan is a good medicine for his blood pressure control in the setting of diabetes.  He verbalized understanding.     Recommended to get his basic metabolic panel checked later this week.  If creatinine is stable then losartan can be resumed. His other home medications were continued as before.     Follow-up with primary care physician, and with his oncologist.  Wife is in the room and updated.      ED / Discharge Outreach Protocol    Patient Contact    Attempt # 1    Was call answered?  No.  Left message on voicemail with information to call me back.    Mayte RN  Triage Nurse

## 2020-08-25 NOTE — TELEPHONE ENCOUNTER
"Sending to  as a FYI    Called pt back. He started to check his BG once a day since discharge. This morning fasting BG was 78. Denies any hypoglycemia sx's. He is not taking any diabetic med(glimepiride 4 mg) or BP med(Hyzaar 100-25 mg) at this time. Declines HTN or any cardiac sx's. Able to keep food & fluids down.      Scheduled a video visit with  on 8/27. Advised pt to check his BG at least 3 times a day - fasting, 2 hrs PP morning & 2 hrs PP evening), start checking BP at least once a week(same time of the day, same arm, rest 20 mins prior). Advised to call us back with any concerning sx's. Pt agrees to the plan.      - Pended MTM & Diabetic educator referral through the video visit. Please sign, if appropriate. Also, ED notes recommends to recheck BMP later this week.     ED/Discharge Protocol    \"Hi, my name is Shania Sorto RN, a registered nurse, and I am calling on behalf of Dr. Reilly's office at [facility].  I am calling to follow up and see how things are going for you after your recent visit.\"    \"I see that you were in the (ER/UC/IP) on 8/24/2020.    How are you doing now that you are home?\" See above    Is patient experiencing symptoms that may require a hospital visit?  No    Discharge Instructions    \"Let's review your discharge instructions.  What is/are the follow-up recommendations?  Pt. Response: See above    \"Were you instructed to make a follow-up appointment?\"  Pt. Response: Yes.  Has appointment been made?   Yes      \"When you see the provider, I would recommend that you bring your discharge instructions with you.    Medications    \"How many new medications are you on since your hospitalization/ED visit?\"    0-1  \"How many of your current medicines changed (dose, timing, name, etc.) while you were in the hospital/ED visit?\"   0-1  \"Do you have questions about your medications?\"   No  \"Were you newly diagnosed with heart failure, COPD, diabetes or " "did you have a heart attack?\"   No  For patients on insulin: \"Did you start on insulin in the hospital or did you have your insulin dose changed?\"   No  Post Discharge Medication Reconciliation Status: discharge medications reconciled, continue medications without change.    Was MTM referral placed (*Make sure to put transitions as reason for referral)?   No    Call Summary    \"Do you have any questions or concerns about your condition or care plan at the moment?\"    No  Triage nurse advice given: Call us back with any questions/concerns    Patient was in ER 0 in the past year (assess appropriateness of ER visits.)      \"If you have questions or things don't continue to improve, we encourage you contact us through the main clinic number,  [phone].  Even if the clinic is not open, triage nurses are available 24/7 to help you.     We would like you to know that our clinic has extended hours (provide information).  We also have urgent care (provide details on closest location and hours/contact info)\"      \"Thank you for your time and take care!\"    Mayte, RN  Triage Nurse          "

## 2020-08-25 NOTE — PROGRESS NOTES
PVP Process: Done on 8/25(Confirmed appointment by: phone)    Reason for this visit: ED f/u    Notes from pt during ED f/u phone call on 8/25:  He started to check his BG once a day since discharge. This morning fasting BG was 78. Denies any hypoglycemia sx's. He is not taking any diabetic med(glimepiride 4 mg) or BP med(Hyzaar 100-25 mg) at this time. Declines HTN or any cardiac sx's. Able to keep food & fluids down.      Things to be addressed this visit:   1. Review BG readings - restart diabetic med  2. Review BP readings - restart BP med  3. Recheck BMP this week  4. MTM & Diabetic educator referral - if needed    Mayte RN  Personal Advocate & Liaison([facility]AL)  [phone]

## 2020-08-25 NOTE — TELEPHONE ENCOUNTER
Pt returned call, please contact when available to discuss and advise.    Zee Flaherty on 8/25/2020 at 10:51 AM

## 2020-08-26 NOTE — TELEPHONE ENCOUNTER
Agree with plan of staying off glimepiride and checking blood sugars regularly until follow-up with me.    Sandi Eastman MD  Internal Medicine-Pediatrics

## 2020-08-27 ENCOUNTER — VIRTUAL VISIT (OUTPATIENT)
Dept: PEDIATRICS | Facility: CLINIC | Age: 80
End: 2020-08-27
Payer: COMMERCIAL

## 2020-08-27 DIAGNOSIS — C91.10 CLL (CHRONIC LYMPHOCYTIC LEUKEMIA) (H): ICD-10-CM

## 2020-08-27 DIAGNOSIS — E16.2 HYPOGLYCEMIA: Primary | ICD-10-CM

## 2020-08-27 DIAGNOSIS — I10 HYPERTENSION GOAL BP (BLOOD PRESSURE) < 140/90: ICD-10-CM

## 2020-08-27 DIAGNOSIS — N18.30 CKD (CHRONIC KIDNEY DISEASE) STAGE 3, GFR 30-59 ML/MIN (H): ICD-10-CM

## 2020-08-27 DIAGNOSIS — E11.9 TYPE 2 DIABETES MELLITUS WITHOUT COMPLICATION, WITHOUT LONG-TERM CURRENT USE OF INSULIN (H): ICD-10-CM

## 2020-08-27 PROCEDURE — 99214 OFFICE O/P EST MOD 30 MIN: CPT | Mod: 95 | Performed by: INTERNAL MEDICINE

## 2020-08-27 NOTE — PROGRESS NOTES
"Austin Garza is a 80 year old male who is being evaluated via a billable telephone visit.      The patient has been notified of following:     \"This telephone visit will be conducted via a call between you and your physician/provider. We have found that certain health care needs can be provided without the need for a physical exam.  This service lets us provide the care you need with a short phone conversation.  If a prescription is necessary we can send it directly to your pharmacy.  If lab work is needed we can place an order for that and you can then stop by our lab to have the test done at a later time.    Telephone visits are billed at different rates depending on your insurance coverage. During this emergency period, for some insurers they may be billed the same as an in-person visit.  Please reach out to your insurance provider with any questions.    If during the course of the call the physician/provider feels a telephone visit is not appropriate, you will not be charged for this service.\"    Patient has given verbal consent for Telephone visit?  Yes    What phone number would you like to be contacted at? [phone]    How would you like to obtain your AVS? Isidoro tucker prefers e-mail: [email or web address]     Subjective     Austin Garza is a 80 year old male who presents via phone visit today for the following health issues:    [facility]      Hospital Follow-up Visit:    Hospital/Nursing Home/IP Rehab Facility: [facility]  Date of Admission: 08/23/20  Date of Discharge: 08/24/20  Reason(s) for Admission: Hypoglycemia.      Was your hospitalization related to COVID-19? No   Problems taking medications regularly:  None  Medication changes since discharge: Yes, stopped glimepiride and losartan-hydrochlorothiazide.   Problems adhering to non-medication therapy:  None    Summary of hospitalization:  [facility] discharge summary reviewed  Diagnostic Tests/Treatments reviewed.  Follow up " needed: oncology as scheduled  Other Healthcare Providers Involved in Patient s Care:         Specialist appointment - oncology  Update since discharge: improved.       Post Discharge Medication Reconciliation: discharge medications reconciled and changed, per note/orders.  Plan of care communicated with patient              Benji calls in for a phone visit follow-up from his recent hospitalization for an unresponsive hypoglycemic episode. 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 He reports that before he was discharged his losartan-hydrochlorothiazide was restarted but his other medications were not.    Since discharge he has been doing well. Blood sugars off glimepiride have been in the 70-80s fasting and 130s during the day.     Otherwise feeling well, no low blood sugars.       Review of Systems   Constitutional, HEENT, cardiovascular, pulmonary, gi and gu systems are negative, except as otherwise noted.       Objective          Vitals:  No vitals were obtained today due to virtual visit.    healthy, alert and no distress  PSYCH: Alert and oriented times 3; coherent speech, normal   rate and volume, able to articulate logical thoughts, able   to abstract reason, no tangential thoughts, no hallucinations   or delusions  His affect is normal  RESP: No cough, no audible wheezing, able to talk in full sentences  Remainder of exam unable to be completed due to telephone visits          Assessment/Plan:    Assessment & Plan     Hypoglycemia  Resolved off glimepiride. Based on his recent home blood sugars will hold all his oral anti-hyperglycemics for now and plan to recheck A1c in 3 months (may be skewed slightly due to CLL and anemia). Anticipate his DM at this point is largely diet controlled given his weight loss of ~40 pounds over the past 3 years or so.    Type 2 diabetes mellitus without complication,  "without long-term current use of insulin (H)  As above.     CKD (chronic kidney disease) stage 3, GFR 30-59 ml/min (H)  Due for repeat BMP in 1-2 weeks with oncology; creatinine had returned to near baseline at discharge.     Hypertension goal BP (blood pressure) < 140/90  Will continue with restarted losartan-hydrochlorothiazide and restart atorvastatin.   - losartan-hydrochlorothiazide (HYZAAR) 50-12.5 MG tablet; Take 1 tablet by mouth daily    CLL (chronic lymphocytic leukemia) (H)  Follows with oncology; will discuss whether to restart allopurinol with Dr. Perry.        BMI:   Estimated body mass index is 30.3 kg/m  as calculated from the following:    Height as of 8/23/20: 1.753 m (5' 9\").    Weight as of 8/23/20: 93.1 kg (205 lb 3.2 oz).         There are no Patient Instructions on file for this visit.    No follow-ups on file.    Sandi Nguyen MD  [facility]    Phone call duration:  15 minutes                  "

## 2020-08-27 NOTE — PROGRESS NOTES
"Austin Garza is a 80 year old male who is being evaluated via a billable video visit.      The patient has been notified of following:     \"This video visit will be conducted via a call between you and your physician/provider. We have found that certain health care needs can be provided without the need for an in-person physical exam.  This service lets us provide the care you need with a video conversation.  If a prescription is necessary we can send it directly to your pharmacy.  If lab work is needed we can place an order for that and you can then stop by our lab to have the test done at a later time.    Video visits are billed at different rates depending on your insurance coverage.  Please reach out to your insurance provider with any questions.    If during the course of the call the physician/provider feels a video visit is not appropriate, you will not be charged for this service.\"    Patient has given verbal consent for Video visit? {YES-NO  Default Yes:4444::\"Yes\"}  How would you like to obtain your AVS? {AVS Preference:130565}  If you are dropped from the video visit, the video invite should be resent to: {video visit invitation:527899}  Will anyone else be joining your video visit? {:307852}  {If patient encounters technical issues they should call [phone] :630935}    Subjective     Austin Garza is a 80 year old male who presents today via video visit for the following health issues:    HPI      {PEDS Chronic and Acute Problems (Optional):375995}     Video Start Time: {video visit start/end time for provider to select:071597}      Hospital Follow-up Visit:    Hospital/Nursing Home/IP Rehab Facility: [facility]  Date of Admission: 08/23/2020  Date of Discharge: 08/24/2020  Reason(s) for Admission: Hypoglycemia      Was your hospitalization related to COVID-19? {Yes add questions_No:267466}  Problems taking medications regularly:  {NONE DEFAULTED:306773::\"None\"}  Medication " "changes since discharge: {NONE DEFAULTED:321540::\"None\"}  Problems adhering to non-medication therapy:  {NONE DEFAULTED:008539::\"None\"}    Summary of hospitalization:  {HOSPITAL DISCHARGE SUMMARY INFO:217673::\"[facility] discharge summary reviewed\"}  Diagnostic Tests/Treatments reviewed.  Follow up needed: {NONE DEFAULTED:942868::\"none\"}  Other Healthcare Providers Involved in Patient s Care:         {those currently involved after discharge:536780::\"None\"}  Update since discharge: {IMPROVED DEFAULT:614923::\"improved.\"} {TIP  Include information from family/caregivers, SNF, Care Coordination :613494}      Post Discharge Medication Reconciliation: {ACO Med Rec (Provider):930350}.  Plan of care communicated with {Communicate Plan to:545876::\"patient\"}     {Reference  Coding guidelines- Moderate Complexity F2F/Video within 7 - 14 days of discharge 13325, High Complexity F2F/Video within 7 days 87003 or abfepw62 days 29720 :800256}           Review of Systems   {ROS COMP (Optional):023090}      Objective           Vitals:  No vitals were obtained today due to virtual visit.    Physical Exam     {video visit exam brief selected:756279::\"GENERAL: Healthy, alert and no distress\",\"EYES: Eyes grossly normal to inspection.  No discharge or erythema, or obvious scleral/conjunctival abnormalities.\",\"RESP: No audible wheeze, cough, or visible cyanosis.  No visible retractions or increased work of breathing.  \",\"SKIN: Visible skin clear. No significant rash, abnormal pigmentation or lesions.\",\"NEURO: Cranial nerves grossly intact.  Mentation and speech appropriate for age.\",\"PSYCH: Mentation appears normal, affect normal/bright, judgement and insight intact, normal speech and appearance well-groomed.\"}      {Diagnostic Test Results (Optional):544945}        {PROVIDER CHARTING PREFERENCE:809382}      Video-Visit Details    Type of service:  Video Visit    Video End Time:{video visit start/end time for provider to " "select:933800}    Originating Location (pt. Location): {video visit patient location:423040::\"Home\"}    Distant Location (provider location):  [facility]     Platform used for Video Visit: {Virtual Visit Platforms:318603::\"[facility]\"}          "

## 2020-08-29 RX ORDER — LOSARTAN POTASSIUM AND HYDROCHLOROTHIAZIDE 12.5; 5 MG/1; MG/1
1 TABLET ORAL DAILY
Status: SHIPPED | DISCHARGE
Start: 2020-08-29 | End: 2020-12-30

## 2020-08-29 NOTE — PATIENT INSTRUCTIONS
Do not restart glimepiride. We will plan to recheck A1c in 3 months, fine to check blood sugars intermittently until then and send me results.    OK to restart atorvastatin.     Discuss w/ Dr. Perry about restarting allopurinol.     Labs as scheduled in next 1-2 weeks.

## 2020-09-04 ENCOUNTER — PATIENT OUTREACH (OUTPATIENT)
Dept: ONCOLOGY | Facility: CLINIC | Age: 80
End: 2020-09-04

## 2020-09-04 ENCOUNTER — HOSPITAL ENCOUNTER (OUTPATIENT)
Facility: CLINIC | Age: 80
Setting detail: SPECIMEN
Discharge: HOME | End: 2020-09-04
Attending: INTERNAL MEDICINE | Admitting: INTERNAL MEDICINE
Payer: COMMERCIAL

## 2020-09-04 DIAGNOSIS — C91.10 CLL (CHRONIC LYMPHOCYTIC LEUKEMIA) (H): ICD-10-CM

## 2020-09-04 LAB
ALBUMIN SERPL-MCNC: 4 G/DL (ref 3.4–5)
ALP SERPL-CCNC: 46 U/L (ref 40–150)
ALT SERPL W P-5'-P-CCNC: 15 U/L (ref 0–70)
ANION GAP SERPL CALCULATED.3IONS-SCNC: 5 MMOL/L (ref 3–14)
ANISOCYTOSIS BLD QL SMEAR: ABNORMAL
AST SERPL W P-5'-P-CCNC: 14 U/L (ref 0–45)
BASOPHILS # BLD AUTO: 0 10E9/L (ref 0–0.2)
BASOPHILS NFR BLD AUTO: 0 %
BILIRUB SERPL-MCNC: 1 MG/DL (ref 0.2–1.3)
BUN SERPL-MCNC: 29 MG/DL (ref 7–30)
CALCIUM SERPL-MCNC: 8.9 MG/DL (ref 8.5–10.1)
CHLORIDE SERPL-SCNC: 106 MMOL/L (ref 94–109)
CO2 SERPL-SCNC: 27 MMOL/L (ref 20–32)
CREAT SERPL-MCNC: 1.67 MG/DL (ref 0.66–1.25)
DIFFERENTIAL METHOD BLD: ABNORMAL
EOSINOPHIL # BLD AUTO: 0 10E9/L (ref 0–0.7)
EOSINOPHIL NFR BLD AUTO: 0 %
ERYTHROCYTE [DISTWIDTH] IN BLOOD BY AUTOMATED COUNT: 18.3 % (ref 10–15)
GFR SERPL CREATININE-BSD FRML MDRD: 39 ML/MIN/{1.73_M2}
GLUCOSE SERPL-MCNC: 211 MG/DL (ref 70–99)
HCT VFR BLD AUTO: 28.7 % (ref 40–53)
HGB BLD-MCNC: 8.6 G/DL (ref 13.3–17.7)
LDH SERPL L TO P-CCNC: 187 U/L (ref 85–227)
LYMPHOCYTES # BLD AUTO: 77.4 10E9/L (ref 0.8–5.3)
LYMPHOCYTES NFR BLD AUTO: 95 %
MACROCYTES BLD QL SMEAR: PRESENT
MCH RBC QN AUTO: 33.9 PG (ref 26.5–33)
MCHC RBC AUTO-ENTMCNC: 30 G/DL (ref 31.5–36.5)
MCV RBC AUTO: 113 FL (ref 78–100)
MONOCYTES # BLD AUTO: 0.8 10E9/L (ref 0–1.3)
MONOCYTES NFR BLD AUTO: 1 %
NEUTROPHILS # BLD AUTO: 3.3 10E9/L (ref 1.6–8.3)
NEUTROPHILS NFR BLD AUTO: 4 %
OVALOCYTES BLD QL SMEAR: SLIGHT
PLATELET # BLD AUTO: 110 10E9/L (ref 150–450)
PLATELET # BLD EST: ABNORMAL 10*3/UL
POTASSIUM SERPL-SCNC: 4.3 MMOL/L (ref 3.4–5.3)
PROT SERPL-MCNC: 6.7 G/DL (ref 6.8–8.8)
RBC # BLD AUTO: 2.54 10E12/L (ref 4.4–5.9)
SODIUM SERPL-SCNC: 138 MMOL/L (ref 133–144)
URATE SERPL-MCNC: 6.3 MG/DL (ref 3.5–7.2)
WBC # BLD AUTO: 81.5 10E9/L (ref 4–11)

## 2020-09-04 PROCEDURE — 83615 LACTATE (LD) (LDH) ENZYME: CPT | Performed by: INTERNAL MEDICINE

## 2020-09-04 PROCEDURE — 36415 COLL VENOUS BLD VENIPUNCTURE: CPT

## 2020-09-04 PROCEDURE — 84550 ASSAY OF BLOOD/URIC ACID: CPT | Performed by: INTERNAL MEDICINE

## 2020-09-04 PROCEDURE — 80053 COMPREHEN METABOLIC PANEL: CPT | Performed by: INTERNAL MEDICINE

## 2020-09-04 PROCEDURE — 85025 COMPLETE CBC W/AUTO DIFF WBC: CPT | Performed by: INTERNAL MEDICINE

## 2020-09-04 RX ORDER — ALLOPURINOL 300 MG/1
TABLET ORAL
Qty: 30 TABLET | Refills: 1 | OUTPATIENT
Start: 2020-09-04

## 2020-09-04 NOTE — PROGRESS NOTES
Breana Perry MD  You; Arlin iPzano RN 3 minutes ago (11:01 AM)       Thanks.      Message text       Arlin Pizano RN  You; Breana Perry MD 7 minutes ago (10:56 AM)       Hi Dr. Perry-     Just wanted to notify you of patient's critical WBC.  Let us know if you'd like us to do anything for patient.  Looks like his last WBC count from 8/23/20 was relatively the same (80.2).     Thanks,     SHAMIR Oliveros, BSN      Routing comment

## 2020-09-04 NOTE — TELEPHONE ENCOUNTER
Refused Prescriptions:                       Disp   Refills    allopurinol (ZYLOPRIM) 300 MG tablet [Phar*30 tab*1        Sig: TAKE 1 TABLET BY MOUTH EVERY DAY  Refused By: BRENNEN BURRIS  Reason for Refusal: Medication has been discontinued    Per Dr. Perry, she does not wish to restart Mrak's allopurinol just yet. She has an appointment with Mark coming up, 9/8/20.     Writer called Mark and notified him that Dr. Perry is not going to restart the allopurinol yet. Writer gave Mark his uric acid level that was drawn today:    Results for MARK MORA (MRN [id number]) as of 9/4/2020 15:05   Ref. Range 9/4/2020 10:06   Uric Acid Latest Ref Range: 3.5 - 7.2 mg/dL 6.3     Mark verbalized understanding and is in agreement with the plan. Writer confirmed his upcoming appointment with Dr. Perry on 9/8.     Brennen Burris, RN, BSN, NAELM  RN Care Coordinator  [facility]  [phone]

## 2020-09-04 NOTE — PROGRESS NOTES
Medical Assistant Note:  Austin Garza presents today for lab draw.    Patient seen by provider today: No.   present during visit today: Not Applicable.    Concerns: No Concerns.    Procedure:  Labs drawn: .    Post Assessment:  Labs drawn without difficulty: Yes.    Discharge Plan:  Departure Mode: Ambulatory.    Face to Face Time: 10.    Ita Pinto, [facility]

## 2020-09-04 NOTE — PROGRESS NOTES
DATE:  9/4/2020   TIME OF RECEIPT FROM LAB:  10:51am  LAB TEST:  WBC  LAB VALUE:  81.5  RESULTS GIVEN WITH READ-BACK TO (PROVIDER): Dr. Breana Perry  TIME LAB VALUE REPORTED TO PROVIDER:  10:52am

## 2020-09-08 ENCOUNTER — VIRTUAL VISIT (OUTPATIENT)
Dept: ONCOLOGY | Facility: CLINIC | Age: 80
End: 2020-09-08
Attending: INTERNAL MEDICINE
Payer: COMMERCIAL

## 2020-09-08 DIAGNOSIS — C91.10 CLL (CHRONIC LYMPHOCYTIC LEUKEMIA) (H): Primary | ICD-10-CM

## 2020-09-08 PROCEDURE — 40001009 ZZH VIDEO/TELEPHONE VISIT; NO CHARGE

## 2020-09-08 PROCEDURE — 99213 OFFICE O/P EST LOW 20 MIN: CPT | Mod: 95 | Performed by: INTERNAL MEDICINE

## 2020-09-08 NOTE — LETTER
"    9/8/2020         RE: Austin Garza  [address] [place] [address]        Dear Colleague,    Thank you for referring your patient, Austin Garza, to the [facility]. Please see a copy of my visit note below.    Austin Garza is a 80 year old male who is being evaluated via a billable video visit.      The patient has been notified of following:     \"This video visit will be conducted via a call between you and your physician/provider. We have found that certain health care needs can be provided without the need for an in-person physical exam.  This service lets us provide the care you need with a video conversation.  If a prescription is necessary we can send it directly to your pharmacy.  If lab work is needed we can place an order for that and you can then stop by our lab to have the test done at a later time.    Video visits are billed at different rates depending on your insurance coverage.  Please reach out to your insurance provider with any questions.    If during the course of the call the physician/provider feels a video visit is not appropriate, you will not be charged for this service.\"    Patient has given verbal consent for Video visit? Yes  How would you like to obtain your AVS? MyChart  If you are dropped from the video visit, the video invite should be resent to: Text to cell phone: [phone]  Will anyone else be joining your video visit? No    Video-Visit Details    Type of service:  Video Visit    Video Start Time: 7:57 am  Video End Time: 8:04 AM    Originating Location (pt. Location): Home    Distant Location (provider location):  [facility]     Platform used for Video Visit: [facility]    Hematology/Oncology Established Patient Note      Today's Date: 9/08/20    Reason for Follow-up: CLL      HISTORY OF PRESENT ILLNESS: Austin Garza is a 80 year old male with PMHx of DMII, HTN who presented with leukocytosis. "  In November 2017, he was found to have elevated WBC of 61.7K, hemoglobin of 10.4, and platelet of 115K.  There were no other CBC results available between 2010 and 2017.  Prior to 2010, he had normal CBC, with normal to mild anemia, and mild thrombocytopenia.   He was asymptomatic.    He underwent bone marrow biopsy on 11/21/17, which was consistent with chronic lymphocytic leukemia/small lymphocytic lymphoma, with 13% ringed sideroblasts identified.  The presence of increased numbers of ringed sideroblasts raises the possibility of an associated myelodysplastic syndrome.  Dysplastic changes are not identified though.  Many cases exhibiting ringed sideroblasts are metabolic origin, without significant risk of progression to acute leukemia, and these typically do not show dysplastic morphology as is the case with this specimen.  15% ringed sideroblasts are required for a diagnosis for RARS, which this specimen does not meet.  Flow cytometry is also consistent with CLL/SLL.  Cytogenetics show two (10%) of the metaphase cells comprise a clone characterized by a deletion within the proximal long arm of a chromosome 13 as the sole karyotypic abnormality.  Three (15%) metaphases had loss of the Y chromosome as the sole abnormality.    CT scan on 11/29/17 shows mildly enlarged left axillary lymph node and periaortic lymph nodes in the retroperitoneum of the abdomen.  Spleen is also enlarged.    On his staging CT scan for CLL, he was incidentally found to have a large infrarenal abdominal aortic aneurysm, measuring 7.6 cm.  He was seen by Dr. Thomas, and he underwent EVAR on 12/4/17.     He started ibrutinib on 5/4/20.  It was held 5/28/20-6/4/20 for tooth extraction.      INTERIM HISTORY: Austin says that he feels well.  He was recently admitted to the hospital last month due to hypoglycemia.  His medication were adjusted, and he is working with his PCP on that.  His allopurinol was stopped due to mild worsening of his  CKD.  He continues to take ibrutinib without problems.      REVIEW OF SYSTEMS:   14 point ROS was reviewed and is negative other than as noted above in HPI.       HOME MEDICATIONS:  Current Outpatient Medications   Medication Sig Dispense Refill     aspirin (ASA) 81 MG tablet Take 1 tablet (81 mg) by mouth every evening (Patient taking differently: Take 325 mg by mouth daily ) 90 tablet 3     atorvastatin (LIPITOR) 20 MG tablet Take 1 tablet (20 mg) by mouth At Bedtime 90 tablet 3     clopidogrel (PLAVIX) 75 MG tablet TAKE 1 TABLET BY MOUTH DAILY 90 tablet 1     fluocinonide (LIDEX) 0.05 % external cream Apply topically 2 times daily as needed Itchy bug bites. Use for up to 2 weeks at a time.       ibrutinib (IMBRUVICA) 420 MG tablet Take 1 tablet (420 mg) by mouth daily 28 tablet 0     losartan-hydrochlorothiazide (HYZAAR) 50-12.5 MG tablet Take 1 tablet by mouth daily       Multiple Vitamins-Minerals (CENTRUM SILVER) per tablet Take 1 tablet by mouth daily           ALLERGIES:  Allergies   Allergen Reactions     Ace Inhibitors      cough         PAST MEDICAL HISTORY:  Past Medical History:   Diagnosis Date     AAA (abdominal aortic aneurysm)      BCC (basal cell carcinoma of skin) - face      CLL (chronic lymphocytic leukemia)      Diaphragmatic hernia      Diverticulitis of colon      Esophageal reflux      Essential hypertension      Hyperlipidemia      Irritable bowel syndrome      Macular degeneration (senile) of retina      Squamous Cell Carcinoma, Back 1988     Type 2 diabetes mellitus          PAST SURGICAL HISTORY:  Past Surgical History:   Procedure Laterality Date     APPENDECTOMY OPEN  1966     BONE MARROW BIOPSY, BONE SPECIMEN, NEEDLE/TROCAR N/A 11/21/2017    Procedure: BIOPSY BONE MARROW;  BONE MARROW BIOPSY;  Surgeon: Shady Garcia MD;  Location:  GI     COLONOSCOPY  2002     ENDOVASCULAR REPAIR ANEURYSM ABDOMINAL AORTA N/A 12/4/2017    Procedure: ENDOVASCULAR REPAIR ANEURYSM ABDOMINAL  AORTA;  ENDOVASCULAR REPAIR ANEURYSM ABDOMINAL AORTA;  Surgeon: Milton Cazares MD;  Location: [facility] OR     Fistulotomy with marsupialization for repair of fisture in ano       IR ABDOMINAL AORTOGRAM  3/11/2019     IR VISCERAL EMBOLIZATION  2019     Multiple skin tags - resection  1998     Squamous cell skin cancer resection - back           SOCIAL HISTORY:  Social History     Socioeconomic History     Marital status:      Spouse name: librado     Number of children: 0     Years of education: 17     Highest education level: Not on file   Occupational History     Occupation: retired   Social Needs     Financial resource strain: Not on file     Food insecurity     Worry: Not on file     Inability: Not on file     Transportation needs     Medical: Not on file     Non-medical: Not on file   Tobacco Use     Smoking status: Former Smoker     Packs/day: 0.50     Years: 20.00     Pack years: 10.00     Last attempt to quit: 1975     Years since quittin.7     Smokeless tobacco: Former User   Substance and Sexual Activity     Alcohol use: Yes     Alcohol/week: 10.0 - 14.0 standard drinks     Types: 10 - 14 Standard drinks or equivalent per week     Comment: 2 drinks a day/wine     Drug use: No     Sexual activity: Never   Lifestyle     Physical activity     Days per week: Not on file     Minutes per session: Not on file     Stress: Not on file   Relationships     Social connections     Talks on phone: Not on file     Gets together: Not on file     Attends Islam service: Not on file     Active member of club or organization: Not on file     Attends meetings of clubs or organizations: Not on file     Relationship status: Not on file     Intimate partner violence     Fear of current or ex partner: Not on file     Emotionally abused: Not on file     Physically abused: Not on file     Forced sexual activity: Not on file   Other Topics Concern     Parent/sibling w/ CABG, MI or angioplasty before  65F 55M? Not Asked   Social History Narrative     Not on file   He quit smoking in .  He drinks 1-3 glasses of wine a night with dinner.  He is retired, and used to work as a .  He lives with his wife in [place].  His cousin had lung cancer and  around age 69.  Otherwise, he denies family history of cancer.        FAMILY HISTORY:  Family History   Problem Relation Age of Onset     Cerebrovascular Disease Father         x 2     Hypertension Mother      C.A.D. Mother      Cancer Mother         skin     Eye Disorder Mother         glaucoma     Prostate Cancer No family hx of      Cancer - colorectal No family hx of      Glaucoma No family hx of      Macular Degeneration No family hx of          PHYSICAL EXAM:  Vital signs:  There were no vitals taken for this visit.   ECO  GENERAL/CONSTITUTIONAL: No acute distress. Healthy, alert.  EYES: No scleral icterus.  No redness or discharge.    RESPIRATORY: No audible wheeze, cough, or visible cyanosis.  No visible retractions or increased work of breathing.  Able to speak fully in complete sentences.  MUSCULOSKELETAL: Normal range of motion.  NEUROLOGIC: Alert, oriented, answers questions appropriately. No tremor. Mentation intact and speech normal  INTEGUMENTARY: No jaundice.  No obvious rash or skin lesions.  PSYCHIATRIC:  Mentation appears normal, affect normal/bright, judgement and insight intact, normal speech and appearance well-groomed.    The rest of a comprehensive physical exam is deferred due to public health emergency video visit restrictions.        LABS:  CBC RESULTS:   Recent Labs   Lab Test 20  1006   WBC 81.5*   RBC 2.54*   HGB 8.6*   HCT 28.7*   *   MCH 33.9*   MCHC 30.0*   RDW 18.3*   *     Recent Labs   Lab Test 20  1006 20  0624    137   POTASSIUM 4.3 4.0   CHLORIDE 106 108   CO2 27 25   ANIONGAP 5 4   * 116*   BUN 29 26   CR 1.67* 1.65*   MAGDALENO 8.9 8.0*     Lab Results   Component Value Date     AST 14 09/04/2020     Lab Results   Component Value Date    ALT 15 09/04/2020     Lab Results   Component Value Date    BILICONJ 0.0 04/29/2005      Lab Results   Component Value Date    BILITOTAL 1.0 09/04/2020     Lab Results   Component Value Date    ALBUMIN 4.0 09/04/2020     Lab Results   Component Value Date    PROTTOTAL 6.7 09/04/2020      Lab Results   Component Value Date    ALKPHOS 46 09/04/2020         Component      Latest Ref Rng & Units 9/4/2020   Uric Acid      3.5 - 7.2 mg/dL 6.3   Lactate Dehydrogenase      85 - 227 U/L 187       ASSESSMENT/PLAN:  Austin Garza is a 80 year old male with:    1) CLL/SLL: BLACKWOOD stage III, with lymphocytosis, lymphadenopathy, splenomegaly, and anemia.  He has mild thrombocytopenia as well, but is above 100K.  He presented with leukocytosis with WBC of 61.7K, hemoglobin of 10.4, and platelet count of 115K on diagnosis.  Bone marrow biopsy and flow cytometry confirmed CLL/SLL.  CT scan shows mildly enlarged left axillary lymph node and periaortic lymph nodes in the retroperitoneum of the abdomen, with enlarged spleen.      Due to worsening lymphocyte count, anemia, thrombocytopenia, as well as fatigue and night sweats, he started ibrutinib on 5/4/20.      His WBC remains elevated, but has trended down some, and stable from last month.  Hemoglobin and platelet count are stable.          -continue ibrutinib.  We discussed potential side effects, including bleeding risk.  He is not on warfarin and has no history of arrhythmias.  He is on aspirin and Plavix though.  I discussed with pharmacy.  There is no contraindication, but we will monitor closely for bleeding.    -pharmacy following  -uric acid was mildly elevated, so allopurinol was started.  Due to mild acute on chronic CKD during hospitalization, it was stopped.  Uric acid is normal now.  Will continue to hold allopurinol for now.    -hemoglobin is slightly improved at 8.6, so will hold off on blood transfusion for  now. He will call us and let us know if he feels weaker and needs another blood transfusion.  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 He completed radiation at [facility] with planned 60 Gy x 30 fractions. He has completed radiation and noted that he did not need any more follow-up for this.    3) Abdominal aortic aneurysm: measures up to 7.6 cm on CT scan, incidentally found.  He underwent EVAR on 12/4/17.  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  He is on Plavix now.  On 5/13/19, he underwent and percutaneous aneurysm sac puncture and endo leak embolization by IR on 5/13/19.  -follow-up with vascular surgery and IR    4) Diabetes, hypertension:  -following with PCP      Breana Perry MD  Hematology/Oncology  [facility]                          Again, thank you for allowing me to participate in the care of your patient.        Sincerely,        Breana Perry MD

## 2020-09-08 NOTE — PROGRESS NOTES
"Austin Garza is a 80 year old male who is being evaluated via a billable video visit.      The patient has been notified of following:     \"This video visit will be conducted via a call between you and your physician/provider. We have found that certain health care needs can be provided without the need for an in-person physical exam.  This service lets us provide the care you need with a video conversation.  If a prescription is necessary we can send it directly to your pharmacy.  If lab work is needed we can place an order for that and you can then stop by our lab to have the test done at a later time.    Video visits are billed at different rates depending on your insurance coverage.  Please reach out to your insurance provider with any questions.    If during the course of the call the physician/provider feels a video visit is not appropriate, you will not be charged for this service.\"    Patient has given verbal consent for Video visit? Yes  How would you like to obtain your AVS? MyChart  If you are dropped from the video visit, the video invite should be resent to: Text to cell phone: [phone]  Will anyone else be joining your video visit? No    Video-Visit Details    Type of service:  Video Visit    Video Start Time: 7:57 am  Video End Time: 8:04 AM    Originating Location (pt. Location): Home    Distant Location (provider location):  [facility]     Platform used for Video Visit: Well        [facility]    Hematology/Oncology Established Patient Note      Today's Date: 9/08/20    Reason for Follow-up: CLL      HISTORY OF PRESENT ILLNESS: Austin Garza is a 80 year old male with PMHx of DMII, HTN who presented with leukocytosis.  In November 2017, he was found to have elevated WBC of 61.7K, hemoglobin of 10.4, and platelet of 115K.  There were no other CBC results available between 2010 and 2017.  Prior to 2010, he had normal CBC, with normal to mild anemia, and " mild thrombocytopenia.   He was asymptomatic.    He underwent bone marrow biopsy on 11/21/17, which was consistent with chronic lymphocytic leukemia/small lymphocytic lymphoma, with 13% ringed sideroblasts identified.  The presence of increased numbers of ringed sideroblasts raises the possibility of an associated myelodysplastic syndrome.  Dysplastic changes are not identified though.  Many cases exhibiting ringed sideroblasts are metabolic origin, without significant risk of progression to acute leukemia, and these typically do not show dysplastic morphology as is the case with this specimen.  15% ringed sideroblasts are required for a diagnosis for RARS, which this specimen does not meet.  Flow cytometry is also consistent with CLL/SLL.  Cytogenetics show two (10%) of the metaphase cells comprise a clone characterized by a deletion within the proximal long arm of a chromosome 13 as the sole karyotypic abnormality.  Three (15%) metaphases had loss of the Y chromosome as the sole abnormality.    CT scan on 11/29/17 shows mildly enlarged left axillary lymph node and periaortic lymph nodes in the retroperitoneum of the abdomen.  Spleen is also enlarged.    On his staging CT scan for CLL, he was incidentally found to have a large infrarenal abdominal aortic aneurysm, measuring 7.6 cm.  He was seen by Dr. Thomas, and he underwent EVAR on 12/4/17.     He started ibrutinib on 5/4/20.  It was held 5/28/20-6/4/20 for tooth extraction.      INTERIM HISTORY: Austin says that he feels well.  He was recently admitted to the hospital last month due to hypoglycemia.  His medication were adjusted, and he is working with his PCP on that.  His allopurinol was stopped due to mild worsening of his CKD.  He continues to take ibrutinib without problems.      REVIEW OF SYSTEMS:   14 point ROS was reviewed and is negative other than as noted above in HPI.       HOME MEDICATIONS:  Current Outpatient Medications   Medication Sig Dispense  Refill     aspirin (ASA) 81 MG tablet Take 1 tablet (81 mg) by mouth every evening (Patient taking differently: Take 325 mg by mouth daily ) 90 tablet 3     atorvastatin (LIPITOR) 20 MG tablet Take 1 tablet (20 mg) by mouth At Bedtime 90 tablet 3     clopidogrel (PLAVIX) 75 MG tablet TAKE 1 TABLET BY MOUTH DAILY 90 tablet 1     fluocinonide (LIDEX) 0.05 % external cream Apply topically 2 times daily as needed Itchy bug bites. Use for up to 2 weeks at a time.       ibrutinib (IMBRUVICA) 420 MG tablet Take 1 tablet (420 mg) by mouth daily 28 tablet 0     losartan-hydrochlorothiazide (HYZAAR) 50-12.5 MG tablet Take 1 tablet by mouth daily       Multiple Vitamins-Minerals (CENTRUM SILVER) per tablet Take 1 tablet by mouth daily           ALLERGIES:  Allergies   Allergen Reactions     Ace Inhibitors      cough         PAST MEDICAL HISTORY:  Past Medical History:   Diagnosis Date     AAA (abdominal aortic aneurysm)      BCC (basal cell carcinoma of skin) - face      CLL (chronic lymphocytic leukemia)      Diaphragmatic hernia      Diverticulitis of colon      Esophageal reflux      Essential hypertension      Hyperlipidemia      Irritable bowel syndrome      Macular degeneration (senile) of retina      Squamous Cell Carcinoma, Back 1988     Type 2 diabetes mellitus          PAST SURGICAL HISTORY:  Past Surgical History:   Procedure Laterality Date     APPENDECTOMY OPEN  1966     BONE MARROW BIOPSY, BONE SPECIMEN, NEEDLE/TROCAR N/A 11/21/2017    Procedure: BIOPSY BONE MARROW;  BONE MARROW BIOPSY;  Surgeon: Shady Garcia MD;  Location:  GI     COLONOSCOPY  2002     ENDOVASCULAR REPAIR ANEURYSM ABDOMINAL AORTA N/A 12/4/2017    Procedure: ENDOVASCULAR REPAIR ANEURYSM ABDOMINAL AORTA;  ENDOVASCULAR REPAIR ANEURYSM ABDOMINAL AORTA;  Surgeon: Milton Cazares MD;  Location:  OR     Fistulotomy with marsupialization for repair of fisture in ano  2007     IR ABDOMINAL AORTOGRAM  3/11/2019     IR VISCERAL  EMBOLIZATION  2019     Multiple skin tags - resection  1998     Squamous cell skin cancer resection - back           SOCIAL HISTORY:  Social History     Socioeconomic History     Marital status:      Spouse name: librado     Number of children: 0     Years of education: 17     Highest education level: Not on file   Occupational History     Occupation: retired   Social Needs     Financial resource strain: Not on file     Food insecurity     Worry: Not on file     Inability: Not on file     Transportation needs     Medical: Not on file     Non-medical: Not on file   Tobacco Use     Smoking status: Former Smoker     Packs/day: 0.50     Years: 20.00     Pack years: 10.00     Last attempt to quit: 1975     Years since quittin.7     Smokeless tobacco: Former User   Substance and Sexual Activity     Alcohol use: Yes     Alcohol/week: 10.0 - 14.0 standard drinks     Types: 10 - 14 Standard drinks or equivalent per week     Comment: 2 drinks a day/wine     Drug use: No     Sexual activity: Never   Lifestyle     Physical activity     Days per week: Not on file     Minutes per session: Not on file     Stress: Not on file   Relationships     Social connections     Talks on phone: Not on file     Gets together: Not on file     Attends Taoism service: Not on file     Active member of club or organization: Not on file     Attends meetings of clubs or organizations: Not on file     Relationship status: Not on file     Intimate partner violence     Fear of current or ex partner: Not on file     Emotionally abused: Not on file     Physically abused: Not on file     Forced sexual activity: Not on file   Other Topics Concern     Parent/sibling w/ CABG, MI or angioplasty before 65F 55M? Not Asked   Social History Narrative     Not on file   He quit smoking in .  He drinks 1-3 glasses of wine a night with dinner.  He is retired, and used to work as a .  He lives with his wife in [place].  His cousin  had lung cancer and  around age 69.  Otherwise, he denies family history of cancer.        FAMILY HISTORY:  Family History   Problem Relation Age of Onset     Cerebrovascular Disease Father         x 2     Hypertension Mother      C.A.D. Mother      Cancer Mother         skin     Eye Disorder Mother         glaucoma     Prostate Cancer No family hx of      Cancer - colorectal No family hx of      Glaucoma No family hx of      Macular Degeneration No family hx of          PHYSICAL EXAM:  Vital signs:  There were no vitals taken for this visit.   ECO  GENERAL/CONSTITUTIONAL: No acute distress. Healthy, alert.  EYES: No scleral icterus.  No redness or discharge.    RESPIRATORY: No audible wheeze, cough, or visible cyanosis.  No visible retractions or increased work of breathing.  Able to speak fully in complete sentences.  MUSCULOSKELETAL: Normal range of motion.  NEUROLOGIC: Alert, oriented, answers questions appropriately. No tremor. Mentation intact and speech normal  INTEGUMENTARY: No jaundice.  No obvious rash or skin lesions.  PSYCHIATRIC:  Mentation appears normal, affect normal/bright, judgement and insight intact, normal speech and appearance well-groomed.    The rest of a comprehensive physical exam is deferred due to public health emergency video visit restrictions.        LABS:  CBC RESULTS:   Recent Labs   Lab Test 20  1006   WBC 81.5*   RBC 2.54*   HGB 8.6*   HCT 28.7*   *   MCH 33.9*   MCHC 30.0*   RDW 18.3*   *     Recent Labs   Lab Test 20  1006 20  0624    137   POTASSIUM 4.3 4.0   CHLORIDE 106 108   CO2 27 25   ANIONGAP 5 4   * 116*   BUN 29 26   CR 1.67* 1.65*   MAGDALENO 8.9 8.0*     Lab Results   Component Value Date    AST 14 2020     Lab Results   Component Value Date    ALT 15 2020     Lab Results   Component Value Date    BILICONJ 0.0 2005      Lab Results   Component Value Date    BILITOTAL 1.0 2020     Lab Results    Component Value Date    ALBUMIN 4.0 09/04/2020     Lab Results   Component Value Date    PROTTOTAL 6.7 09/04/2020      Lab Results   Component Value Date    ALKPHOS 46 09/04/2020         Component      Latest Ref Rng & Units 9/4/2020   Uric Acid      3.5 - 7.2 mg/dL 6.3   Lactate Dehydrogenase      85 - 227 U/L 187       ASSESSMENT/PLAN:  Austin Garza is a 80 year old male with:    1) CLL/SLL: BLACKWOOD stage III, with lymphocytosis, lymphadenopathy, splenomegaly, and anemia.  He has mild thrombocytopenia as well, but is above 100K.  He presented with leukocytosis with WBC of 61.7K, hemoglobin of 10.4, and platelet count of 115K on diagnosis.  Bone marrow biopsy and flow cytometry confirmed CLL/SLL.  CT scan shows mildly enlarged left axillary lymph node and periaortic lymph nodes in the retroperitoneum of the abdomen, with enlarged spleen.      Due to worsening lymphocyte count, anemia, thrombocytopenia, as well as fatigue and night sweats, he started ibrutinib on 5/4/20.      His WBC remains elevated, but has trended down some, and stable from last month.  Hemoglobin and platelet count are stable.          -continue ibrutinib.  We discussed potential side effects, including bleeding risk.  He is not on warfarin and has no history of arrhythmias.  He is on aspirin and Plavix though.  I discussed with pharmacy.  There is no contraindication, but we will monitor closely for bleeding.    -pharmacy following  -uric acid was mildly elevated, so allopurinol was started.  Due to mild acute on chronic CKD during hospitalization, it was stopped.  Uric acid is normal now.  Will continue to hold allopurinol for now.    -hemoglobin is slightly improved at 8.6, so will hold off on blood transfusion for now. He will call us and let us know if he feels weaker and needs another blood transfusion.  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 He completed radiation at [facility] with planned 60 Gy x 30 fractions. He has completed radiation and noted that he did not need any more follow-up for this.    3) Abdominal aortic aneurysm: measures up to 7.6 cm on CT scan, incidentally found.  He underwent EVAR on 12/4/17.  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  He is on Plavix now.  On 5/13/19, he underwent and percutaneous aneurysm sac puncture and endo leak embolization by IR on 5/13/19.  -follow-up with vascular surgery and IR    4) Diabetes, hypertension:  -following with PCP      Breana Perry MD  Hematology/Oncology  [facility]

## 2020-09-08 NOTE — LETTER
"    9/8/2020         RE: Austin Garza  [address] [place] [address]        Dear Colleague,    Thank you for referring your patient, Austin Garza, to the [facility]. Please see a copy of my visit note below.    Austin Garza is a 80 year old male who is being evaluated via a billable video visit.      The patient has been notified of following:     \"This video visit will be conducted via a call between you and your physician/provider. We have found that certain health care needs can be provided without the need for an in-person physical exam.  This service lets us provide the care you need with a video conversation.  If a prescription is necessary we can send it directly to your pharmacy.  If lab work is needed we can place an order for that and you can then stop by our lab to have the test done at a later time.    Video visits are billed at different rates depending on your insurance coverage.  Please reach out to your insurance provider with any questions.    If during the course of the call the physician/provider feels a video visit is not appropriate, you will not be charged for this service.\"    Patient has given verbal consent for Video visit? Yes  How would you like to obtain your AVS? MyChart  If you are dropped from the video visit, the video invite should be resent to: Text to cell phone: [phone]  Will anyone else be joining your video visit? No    Video-Visit Details    Type of service:  Video Visit    Video Start Time: 7:57 am  Video End Time: 8:04 AM    Originating Location (pt. Location): Home    Distant Location (provider location):  [facility]     Platform used for Video Visit: [facility]    Hematology/Oncology Established Patient Note      Today's Date: 9/08/20    Reason for Follow-up: CLL      HISTORY OF PRESENT ILLNESS: Austin Garza is a 80 year old male with PMHx of DMII, HTN who presented with leukocytosis. "  In November 2017, he was found to have elevated WBC of 61.7K, hemoglobin of 10.4, and platelet of 115K.  There were no other CBC results available between 2010 and 2017.  Prior to 2010, he had normal CBC, with normal to mild anemia, and mild thrombocytopenia.   He was asymptomatic.    He underwent bone marrow biopsy on 11/21/17, which was consistent with chronic lymphocytic leukemia/small lymphocytic lymphoma, with 13% ringed sideroblasts identified.  The presence of increased numbers of ringed sideroblasts raises the possibility of an associated myelodysplastic syndrome.  Dysplastic changes are not identified though.  Many cases exhibiting ringed sideroblasts are metabolic origin, without significant risk of progression to acute leukemia, and these typically do not show dysplastic morphology as is the case with this specimen.  15% ringed sideroblasts are required for a diagnosis for RARS, which this specimen does not meet.  Flow cytometry is also consistent with CLL/SLL.  Cytogenetics show two (10%) of the metaphase cells comprise a clone characterized by a deletion within the proximal long arm of a chromosome 13 as the sole karyotypic abnormality.  Three (15%) metaphases had loss of the Y chromosome as the sole abnormality.    CT scan on 11/29/17 shows mildly enlarged left axillary lymph node and periaortic lymph nodes in the retroperitoneum of the abdomen.  Spleen is also enlarged.    On his staging CT scan for CLL, he was incidentally found to have a large infrarenal abdominal aortic aneurysm, measuring 7.6 cm.  He was seen by Dr. Thomas, and he underwent EVAR on 12/4/17.     He started ibrutinib on 5/4/20.  It was held 5/28/20-6/4/20 for tooth extraction.      INTERIM HISTORY: Austin says that he feels well.  He was recently admitted to the hospital last month due to hypoglycemia.  His medication were adjusted, and he is working with his PCP on that.  His allopurinol was stopped due to mild worsening of his  CKD.  He continues to take ibrutinib without problems.      REVIEW OF SYSTEMS:   14 point ROS was reviewed and is negative other than as noted above in HPI.       HOME MEDICATIONS:  Current Outpatient Medications   Medication Sig Dispense Refill     aspirin (ASA) 81 MG tablet Take 1 tablet (81 mg) by mouth every evening (Patient taking differently: Take 325 mg by mouth daily ) 90 tablet 3     atorvastatin (LIPITOR) 20 MG tablet Take 1 tablet (20 mg) by mouth At Bedtime 90 tablet 3     clopidogrel (PLAVIX) 75 MG tablet TAKE 1 TABLET BY MOUTH DAILY 90 tablet 1     fluocinonide (LIDEX) 0.05 % external cream Apply topically 2 times daily as needed Itchy bug bites. Use for up to 2 weeks at a time.       ibrutinib (IMBRUVICA) 420 MG tablet Take 1 tablet (420 mg) by mouth daily 28 tablet 0     losartan-hydrochlorothiazide (HYZAAR) 50-12.5 MG tablet Take 1 tablet by mouth daily       Multiple Vitamins-Minerals (CENTRUM SILVER) per tablet Take 1 tablet by mouth daily           ALLERGIES:  Allergies   Allergen Reactions     Ace Inhibitors      cough         PAST MEDICAL HISTORY:  Past Medical History:   Diagnosis Date     AAA (abdominal aortic aneurysm)      BCC (basal cell carcinoma of skin) - face      CLL (chronic lymphocytic leukemia)      Diaphragmatic hernia      Diverticulitis of colon      Esophageal reflux      Essential hypertension      Hyperlipidemia      Irritable bowel syndrome      Macular degeneration (senile) of retina      Squamous Cell Carcinoma, Back 1988     Type 2 diabetes mellitus          PAST SURGICAL HISTORY:  Past Surgical History:   Procedure Laterality Date     APPENDECTOMY OPEN  1966     BONE MARROW BIOPSY, BONE SPECIMEN, NEEDLE/TROCAR N/A 11/21/2017    Procedure: BIOPSY BONE MARROW;  BONE MARROW BIOPSY;  Surgeon: Shady Garcia MD;  Location:  GI     COLONOSCOPY  2002     ENDOVASCULAR REPAIR ANEURYSM ABDOMINAL AORTA N/A 12/4/2017    Procedure: ENDOVASCULAR REPAIR ANEURYSM ABDOMINAL  AORTA;  ENDOVASCULAR REPAIR ANEURYSM ABDOMINAL AORTA;  Surgeon: Milton Cazares MD;  Location: [facility] OR     Fistulotomy with marsupialization for repair of fisture in ano       IR ABDOMINAL AORTOGRAM  3/11/2019     IR VISCERAL EMBOLIZATION  2019     Multiple skin tags - resection  1998     Squamous cell skin cancer resection - back           SOCIAL HISTORY:  Social History     Socioeconomic History     Marital status:      Spouse name: librado     Number of children: 0     Years of education: 17     Highest education level: Not on file   Occupational History     Occupation: retired   Social Needs     Financial resource strain: Not on file     Food insecurity     Worry: Not on file     Inability: Not on file     Transportation needs     Medical: Not on file     Non-medical: Not on file   Tobacco Use     Smoking status: Former Smoker     Packs/day: 0.50     Years: 20.00     Pack years: 10.00     Last attempt to quit: 1975     Years since quittin.7     Smokeless tobacco: Former User   Substance and Sexual Activity     Alcohol use: Yes     Alcohol/week: 10.0 - 14.0 standard drinks     Types: 10 - 14 Standard drinks or equivalent per week     Comment: 2 drinks a day/wine     Drug use: No     Sexual activity: Never   Lifestyle     Physical activity     Days per week: Not on file     Minutes per session: Not on file     Stress: Not on file   Relationships     Social connections     Talks on phone: Not on file     Gets together: Not on file     Attends [employer] service: Not on file     Active member of club or organization: Not on file     Attends meetings of clubs or organizations: Not on file     Relationship status: Not on file     Intimate partner violence     Fear of current or ex partner: Not on file     Emotionally abused: Not on file     Physically abused: Not on file     Forced sexual activity: Not on file   Other Topics Concern     Parent/sibling w/ CABG, MI or angioplasty before  65F 55M? Not Asked   Social History Narrative     Not on file   He quit smoking in .  He drinks 1-3 glasses of wine a night with dinner.  He is retired, and used to work as a .  He lives with his wife in [place].  His cousin had lung cancer and  around age 69.  Otherwise, he denies family history of cancer.        FAMILY HISTORY:  Family History   Problem Relation Age of Onset     Cerebrovascular Disease Father         x 2     Hypertension Mother      C.A.D. Mother      Cancer Mother         skin     Eye Disorder Mother         glaucoma     Prostate Cancer No family hx of      Cancer - colorectal No family hx of      Glaucoma No family hx of      Macular Degeneration No family hx of          PHYSICAL EXAM:  Vital signs:  There were no vitals taken for this visit.   ECO  GENERAL/CONSTITUTIONAL: No acute distress. Healthy, alert.  EYES: No scleral icterus.  No redness or discharge.    RESPIRATORY: No audible wheeze, cough, or visible cyanosis.  No visible retractions or increased work of breathing.  Able to speak fully in complete sentences.  MUSCULOSKELETAL: Normal range of motion.  NEUROLOGIC: Alert, oriented, answers questions appropriately. No tremor. Mentation intact and speech normal  INTEGUMENTARY: No jaundice.  No obvious rash or skin lesions.  PSYCHIATRIC:  Mentation appears normal, affect normal/bright, judgement and insight intact, normal speech and appearance well-groomed.    The rest of a comprehensive physical exam is deferred due to public health emergency video visit restrictions.        LABS:  CBC RESULTS:   Recent Labs   Lab Test 20  1006   WBC 81.5*   RBC 2.54*   HGB 8.6*   HCT 28.7*   *   MCH 33.9*   MCHC 30.0*   RDW 18.3*   *     Recent Labs   Lab Test 20  1006 20  0624    137   POTASSIUM 4.3 4.0   CHLORIDE 106 108   CO2 27 25   ANIONGAP 5 4   * 116*   BUN 29 26   CR 1.67* 1.65*   MAGDALENO 8.9 8.0*     Lab Results   Component Value Date     AST 14 09/04/2020     Lab Results   Component Value Date    ALT 15 09/04/2020     Lab Results   Component Value Date    BILICONJ 0.0 04/29/2005      Lab Results   Component Value Date    BILITOTAL 1.0 09/04/2020     Lab Results   Component Value Date    ALBUMIN 4.0 09/04/2020     Lab Results   Component Value Date    PROTTOTAL 6.7 09/04/2020      Lab Results   Component Value Date    ALKPHOS 46 09/04/2020         Component      Latest Ref Rng & Units 9/4/2020   Uric Acid      3.5 - 7.2 mg/dL 6.3   Lactate Dehydrogenase      85 - 227 U/L 187       ASSESSMENT/PLAN:  Austin Garza is a 80 year old male with:    1) CLL/SLL: BLACKWOOD stage III, with lymphocytosis, lymphadenopathy, splenomegaly, and anemia.  He has mild thrombocytopenia as well, but is above 100K.  He presented with leukocytosis with WBC of 61.7K, hemoglobin of 10.4, and platelet count of 115K on diagnosis.  Bone marrow biopsy and flow cytometry confirmed CLL/SLL.  CT scan shows mildly enlarged left axillary lymph node and periaortic lymph nodes in the retroperitoneum of the abdomen, with enlarged spleen.      Due to worsening lymphocyte count, anemia, thrombocytopenia, as well as fatigue and night sweats, he started ibrutinib on 5/4/20.      His WBC remains elevated, but has trended down some, and stable from last month.  Hemoglobin and platelet count are stable.          -continue ibrutinib.  We discussed potential side effects, including bleeding risk.  He is not on warfarin and has no history of arrhythmias.  He is on aspirin and Plavix though.  I discussed with pharmacy.  There is no contraindication, but we will monitor closely for bleeding.    -pharmacy following  -uric acid was mildly elevated, so allopurinol was started.  Due to mild acute on chronic CKD during hospitalization, it was stopped.  Uric acid is normal now.  Will continue to hold allopurinol for now.    -hemoglobin is slightly improved at 8.6, so will hold off on blood transfusion for  now. He will call us and let us know if he feels weaker and needs another blood transfusion.  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 He completed radiation at [facility] with planned 60 Gy x 30 fractions. He has completed radiation and noted that he did not need any more follow-up for this.    3) Abdominal aortic aneurysm: measures up to 7.6 cm on CT scan, incidentally found.  He underwent EVAR on 12/4/17.  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  He is on Plavix now.  On 5/13/19, he underwent and percutaneous aneurysm sac puncture and endo leak embolization by IR on 5/13/19.  -follow-up with vascular surgery and IR    4) Diabetes, hypertension:  -following with PCP      Breana Perry MD  Hematology/Oncology  [facility]                          Again, thank you for allowing me to participate in the care of your patient.        Sincerely,        Breana Perry MD

## 2020-09-10 ENCOUNTER — PATIENT OUTREACH (OUTPATIENT)
Dept: ONCOLOGY | Facility: CLINIC | Age: 80
End: 2020-09-10

## 2020-09-10 DIAGNOSIS — C91.10 CLL (CHRONIC LYMPHOCYTIC LEUKEMIA) (H): Primary | ICD-10-CM

## 2020-09-10 DIAGNOSIS — D64.9 ANEMIA, UNSPECIFIED TYPE: ICD-10-CM

## 2020-09-10 NOTE — PROGRESS NOTES
Per Nicci, infusion charge RN, there is no availability for blood transfusions this week but there are openings next Tuesday and Wednesday morning.     Writer called Austin. He reports he is able to come in Tuesday 9/15 at 8 am so he can be done at 11 am.     Austin reports he is able to come in to our clinic for labs (type and screen and CBC) on Monday 9/14.    Writer sent staff message to  schedulers to finalize appointments.     Tosha Burris, RN, BSN, GARIMA  RN Care Coordinator  [facility]  [phone]

## 2020-09-10 NOTE — PROGRESS NOTES
"Received: Today   Message Contents   Melani Fragoso, Tosha CARABALLO RN               Austin would like to talk with you.  He said that during his video visit with dr Perry 9/8/20.  Dr Perry talked about Austin getting some blood transfusions.  He has decided that he would like to get these and would like to talk with you about it.  Please call him back at [phone].   melani          Writer called Austin back and spoke with him. He reports he has been \"breathing hard\" more often recently and is interested in getting a blood transfusion.     Austin reports after he works out at the [employer], he has weak knees and has to sit for about 15 minutes then is fine.     Austin denies dizziness, light-headedness. He reports he prefers the [place] location for blood transfusion.    Writer discussed with Dr. Perry. She reports ok to give him one unit of blood even if his Hgb is the same/stable at 8.6.     Writer will check with infusion for availability and call Austin back at [phone]    Tosha Burris RN, BSN, GARIMA  RN Care Coordinator  [facility]  [phone]      "

## 2020-09-14 ENCOUNTER — PATIENT OUTREACH (OUTPATIENT)
Dept: ONCOLOGY | Facility: CLINIC | Age: 80
End: 2020-09-14

## 2020-09-14 ENCOUNTER — MYC MEDICAL ADVICE (OUTPATIENT)
Dept: PEDIATRICS | Facility: CLINIC | Age: 80
End: 2020-09-14

## 2020-09-14 ENCOUNTER — HOSPITAL ENCOUNTER (OUTPATIENT)
Facility: CLINIC | Age: 80
Setting detail: SPECIMEN
Discharge: HOME | End: 2020-09-14
Attending: INTERNAL MEDICINE | Admitting: INTERNAL MEDICINE
Payer: COMMERCIAL

## 2020-09-14 DIAGNOSIS — C91.10 CLL (CHRONIC LYMPHOCYTIC LEUKEMIA) (H): ICD-10-CM

## 2020-09-14 DIAGNOSIS — C91.10 CLL (CHRONIC LYMPHOCYTIC LEUKEMIA) (H): Primary | ICD-10-CM

## 2020-09-14 DIAGNOSIS — D64.9 ANEMIA, UNSPECIFIED TYPE: ICD-10-CM

## 2020-09-14 LAB
ABO + RH BLD: NORMAL
ABO + RH BLD: NORMAL
ANISOCYTOSIS BLD QL SMEAR: ABNORMAL
BASOPHILS # BLD AUTO: 0 10E9/L (ref 0–0.2)
BASOPHILS NFR BLD AUTO: 0 %
BLD GP AB SCN SERPL QL: NORMAL
BLD PROD TYP BPU: NORMAL
BLOOD BANK CMNT PATIENT-IMP: NORMAL
DIFFERENTIAL METHOD BLD: ABNORMAL
EOSINOPHIL # BLD AUTO: 0 10E9/L (ref 0–0.7)
EOSINOPHIL NFR BLD AUTO: 0 %
ERYTHROCYTE [DISTWIDTH] IN BLOOD BY AUTOMATED COUNT: 17.9 % (ref 10–15)
HCT VFR BLD AUTO: 26.8 % (ref 40–53)
HGB BLD-MCNC: 8.1 G/DL (ref 13.3–17.7)
LYMPHOCYTES # BLD AUTO: 67.7 10E9/L (ref 0.8–5.3)
LYMPHOCYTES NFR BLD AUTO: 90 %
MACROCYTES BLD QL SMEAR: PRESENT
MCH RBC QN AUTO: 34 PG (ref 26.5–33)
MCHC RBC AUTO-ENTMCNC: 30.2 G/DL (ref 31.5–36.5)
MCV RBC AUTO: 113 FL (ref 78–100)
MICROCYTES BLD QL SMEAR: PRESENT
MONOCYTES # BLD AUTO: 0 10E9/L (ref 0–1.3)
MONOCYTES NFR BLD AUTO: 0 %
NEUTROPHILS # BLD AUTO: 7.5 10E9/L (ref 1.6–8.3)
NEUTROPHILS NFR BLD AUTO: 10 %
NUM BPU REQUESTED: 1
OVALOCYTES BLD QL SMEAR: SLIGHT
PLATELET # BLD AUTO: 119 10E9/L (ref 150–450)
PLATELET # BLD EST: ABNORMAL 10*3/UL
RBC # BLD AUTO: 2.38 10E12/L (ref 4.4–5.9)
SPECIMEN EXP DATE BLD: NORMAL
WBC # BLD AUTO: 75.2 10E9/L (ref 4–11)

## 2020-09-14 PROCEDURE — 86850 RBC ANTIBODY SCREEN: CPT | Performed by: INTERNAL MEDICINE

## 2020-09-14 PROCEDURE — 86900 BLOOD TYPING SEROLOGIC ABO: CPT | Performed by: INTERNAL MEDICINE

## 2020-09-14 PROCEDURE — 36415 COLL VENOUS BLD VENIPUNCTURE: CPT

## 2020-09-14 PROCEDURE — 86923 COMPATIBILITY TEST ELECTRIC: CPT | Performed by: INTERNAL MEDICINE

## 2020-09-14 PROCEDURE — 85025 COMPLETE CBC W/AUTO DIFF WBC: CPT | Performed by: INTERNAL MEDICINE

## 2020-09-14 PROCEDURE — 86901 BLOOD TYPING SEROLOGIC RH(D): CPT | Performed by: INTERNAL MEDICINE

## 2020-09-14 RX ORDER — HEPARIN SODIUM,PORCINE 10 UNIT/ML
5 VIAL (ML) INTRAVENOUS
Status: CANCELLED | OUTPATIENT
Start: 2020-09-14

## 2020-09-14 RX ORDER — HEPARIN SODIUM (PORCINE) LOCK FLUSH IV SOLN 100 UNIT/ML 100 UNIT/ML
5 SOLUTION INTRAVENOUS
Status: CANCELLED | OUTPATIENT
Start: 2020-09-14

## 2020-09-14 NOTE — PROGRESS NOTES
Dr. Perry was also notified Mark's hgb today:  Results for MARK MORA (MRN [id number]) as of 9/14/2020 11:09   Ref. Range 9/14/2020 09:16   Hemoglobin Latest Ref Range: 13.3 - 17.7 g/dL 8.1 (L)     Per Dr. Perry, ok to go ahead with blood transfusion tomorrow if he is symptomatic even though he doesn't fall within parameters of hgb < 8.0.     Per patient outreach from last week, Mark was requesting blood transfusion as he was increasingly winded.    Writer updated Florecita, infusion charge RN.     Tosha Burris, RN, BSN, GARIMA  RN Care Coordinator  [facility]  [phone]

## 2020-09-14 NOTE — PROGRESS NOTES
[facility] called writer with critical value:    Results for MARK MORA (MRN [id number]) as of 9/14/2020 09:41   Ref. Range 9/14/2020 09:16   WBC Latest Ref Range: 4.0 - 11.0 10e9/L 75.2 ()     Writer will notify Dr. Perry. Previous WBC on 9/4/20 was 81.5.    Tosha Burris, RN, BSN, GARIMA  RN Care Coordinator  [facility]  [phone]

## 2020-09-15 ENCOUNTER — INFUSION THERAPY VISIT (OUTPATIENT)
Dept: INFUSION THERAPY | Facility: CLINIC | Age: 80
End: 2020-09-15
Attending: INTERNAL MEDICINE
Payer: COMMERCIAL

## 2020-09-15 VITALS
DIASTOLIC BLOOD PRESSURE: 77 MMHG | TEMPERATURE: 96.9 F | RESPIRATION RATE: 16 BRPM | HEART RATE: 73 BPM | SYSTOLIC BLOOD PRESSURE: 131 MMHG | OXYGEN SATURATION: 99 %

## 2020-09-15 DIAGNOSIS — C91.10 CLL (CHRONIC LYMPHOCYTIC LEUKEMIA) (H): Primary | ICD-10-CM

## 2020-09-15 PROCEDURE — P9016 RBC LEUKOCYTES REDUCED: HCPCS

## 2020-09-15 PROCEDURE — 36430 TRANSFUSION BLD/BLD COMPNT: CPT

## 2020-09-15 ASSESSMENT — PAIN SCALES - GENERAL: PAINLEVEL: NO PAIN (0)

## 2020-09-15 NOTE — PROGRESS NOTES
Infusion Nursing Note:  Austin Garza presents today for 1 unit PRBC's.    Patient seen by provider today: No   present during visit today: Not Applicable.    Note: Patient indicates he has had a blood transfusion in the past and denies previous reactions.  Blood started at 120cc/hr and increased to 270cc/hr after 10 minutes.    Intravenous Access:  Peripheral IV placed.    Treatment Conditions:  Blood transfusion consent signed 9/15/2020.      Post Infusion Assessment:  Patient tolerated infusion without incident.  Blood return noted pre and post infusion.  Site patent and intact, free from redness, edema or discomfort.  No evidence of extravasations.  Access discontinued per protocol.       Discharge Plan:   Discharge instructions reviewed with: Patient.  Patient discharged in stable condition accompanied by: self.  Departure Mode: Ambulatory.  Scheduled for labs on 10/2 and MD on 10/6.    DEDRICK ALVARADO RN

## 2020-10-02 ENCOUNTER — HOSPITAL ENCOUNTER (OUTPATIENT)
Facility: CLINIC | Age: 80
Setting detail: SPECIMEN
Discharge: HOME | End: 2020-10-02
Attending: INTERNAL MEDICINE | Admitting: INTERNAL MEDICINE
Payer: COMMERCIAL

## 2020-10-02 ENCOUNTER — PATIENT OUTREACH (OUTPATIENT)
Dept: ONCOLOGY | Facility: CLINIC | Age: 80
End: 2020-10-02

## 2020-10-02 ENCOUNTER — DOCUMENTATION ONLY (OUTPATIENT)
Dept: ONCOLOGY | Facility: CLINIC | Age: 80
End: 2020-10-02

## 2020-10-02 DIAGNOSIS — C91.10 CLL (CHRONIC LYMPHOCYTIC LEUKEMIA) (H): ICD-10-CM

## 2020-10-02 LAB
ALBUMIN SERPL-MCNC: 4 G/DL (ref 3.4–5)
ALP SERPL-CCNC: 44 U/L (ref 40–150)
ALT SERPL W P-5'-P-CCNC: 18 U/L (ref 0–70)
ANION GAP SERPL CALCULATED.3IONS-SCNC: 4 MMOL/L (ref 3–14)
AST SERPL W P-5'-P-CCNC: 16 U/L (ref 0–45)
BASOPHILS # BLD AUTO: 0 10E9/L (ref 0–0.2)
BASOPHILS NFR BLD AUTO: 0 %
BILIRUB SERPL-MCNC: 1 MG/DL (ref 0.2–1.3)
BUN SERPL-MCNC: 39 MG/DL (ref 7–30)
CALCIUM SERPL-MCNC: 9.3 MG/DL (ref 8.5–10.1)
CHLORIDE SERPL-SCNC: 107 MMOL/L (ref 94–109)
CO2 SERPL-SCNC: 28 MMOL/L (ref 20–32)
CREAT SERPL-MCNC: 1.84 MG/DL (ref 0.66–1.25)
DIFFERENTIAL METHOD BLD: ABNORMAL
EOSINOPHIL # BLD AUTO: 0 10E9/L (ref 0–0.7)
EOSINOPHIL NFR BLD AUTO: 0 %
ERYTHROCYTE [DISTWIDTH] IN BLOOD BY AUTOMATED COUNT: 17 % (ref 10–15)
GFR SERPL CREATININE-BSD FRML MDRD: 34 ML/MIN/{1.73_M2}
GLUCOSE SERPL-MCNC: 160 MG/DL (ref 70–99)
HCT VFR BLD AUTO: 30.3 % (ref 40–53)
HGB BLD-MCNC: 9.2 G/DL (ref 13.3–17.7)
LDH SERPL L TO P-CCNC: 220 U/L (ref 85–227)
LYMPHOCYTES # BLD AUTO: 55.7 10E9/L (ref 0.8–5.3)
LYMPHOCYTES NFR BLD AUTO: 91 %
MCH RBC QN AUTO: 33.3 PG (ref 26.5–33)
MCHC RBC AUTO-ENTMCNC: 30.4 G/DL (ref 31.5–36.5)
MCV RBC AUTO: 110 FL (ref 78–100)
MONOCYTES # BLD AUTO: 0.6 10E9/L (ref 0–1.3)
MONOCYTES NFR BLD AUTO: 1 %
NEUTROPHILS # BLD AUTO: 4.9 10E9/L (ref 1.6–8.3)
NEUTROPHILS NFR BLD AUTO: 8 %
PLATELET # BLD AUTO: 112 10E9/L (ref 150–450)
POTASSIUM SERPL-SCNC: 4.7 MMOL/L (ref 3.4–5.3)
PROT SERPL-MCNC: 7 G/DL (ref 6.8–8.8)
RBC # BLD AUTO: 2.76 10E12/L (ref 4.4–5.9)
SODIUM SERPL-SCNC: 139 MMOL/L (ref 133–144)
URATE SERPL-MCNC: 9 MG/DL (ref 3.5–7.2)
WBC # BLD AUTO: 61.2 10E9/L (ref 4–11)

## 2020-10-02 PROCEDURE — 80053 COMPREHEN METABOLIC PANEL: CPT | Performed by: INTERNAL MEDICINE

## 2020-10-02 PROCEDURE — 83615 LACTATE (LD) (LDH) ENZYME: CPT | Performed by: INTERNAL MEDICINE

## 2020-10-02 PROCEDURE — 84550 ASSAY OF BLOOD/URIC ACID: CPT | Performed by: INTERNAL MEDICINE

## 2020-10-02 PROCEDURE — 36415 COLL VENOUS BLD VENIPUNCTURE: CPT

## 2020-10-02 PROCEDURE — 85025 COMPLETE CBC W/AUTO DIFF WBC: CPT | Performed by: INTERNAL MEDICINE

## 2020-10-02 PROCEDURE — 99207 PR NO CHARGE LOS: CPT

## 2020-10-02 NOTE — PROGRESS NOTES
DATE:  10/2/2020   TIME OF RECEIPT FROM LAB:  1023am  LAB TEST:  CBC  LAB VALUE:  WBC 61.2  RESULTS GIVEN WITH READ-BACK TO (PROVIDER):  Dr. Breana Perry  TIME LAB VALUE REPORTED TO PROVIDER:   10:24am

## 2020-10-02 NOTE — PROGRESS NOTES
Oral Chemotherapy Program  Lab review     Reviewed labs from 10/02/2020     Labs are remarkable for SCr 1.84 and no dosage adjustments are necessary at this time.     Follow-up/plan  Continue ibrutinib 420mg daily.   Will follow-up on 10/6 visit with Oma Brambila, Pharm. D., [facility]  10/2/2020

## 2020-10-06 ENCOUNTER — VIRTUAL VISIT (OUTPATIENT)
Dept: ONCOLOGY | Facility: CLINIC | Age: 80
End: 2020-10-06
Attending: INTERNAL MEDICINE
Payer: COMMERCIAL

## 2020-10-06 DIAGNOSIS — C91.10 CLL (CHRONIC LYMPHOCYTIC LEUKEMIA) (H): Primary | ICD-10-CM

## 2020-10-06 PROCEDURE — 999N001193 HC VIDEO/TELEPHONE VISIT; NO CHARGE

## 2020-10-06 PROCEDURE — 99213 OFFICE O/P EST LOW 20 MIN: CPT | Mod: 95 | Performed by: INTERNAL MEDICINE

## 2020-10-06 NOTE — LETTER
"    10/6/2020         RE: Austin Garza  [address] [place] [address]        Dear Colleague,    Thank you for referring your patient, Austin Garza, to the [facility]. Please see a copy of my visit note below.    Austin Garza is a 80 year old male who is being evaluated via a billable video visit.      The patient has been notified of following:     \"This video visit will be conducted via a call between you and your physician/provider. We have found that certain health care needs can be provided without the need for an in-person physical exam.  This service lets us provide the care you need with a video conversation.  If a prescription is necessary we can send it directly to your pharmacy.  If lab work is needed we can place an order for that and you can then stop by our lab to have the test done at a later time.    Video visits are billed at different rates depending on your insurance coverage.  Please reach out to your insurance provider with any questions.    If during the course of the call the physician/provider feels a video visit is not appropriate, you will not be charged for this service.\"    Patient has given verbal consent for Video visit? Yes  How would you like to obtain your AVS? MyChart  If you are dropped from the video visit, the video invite should be resent to: Send to e-mail at: [email or web address]     RACHEL EMAIL INVITE [email or web address]    Will anyone else be joining your video visit? No       - STOPPED ALLOPURINOL FROM ER VISIT 1 MONTH AGO    Geetha Benítez CMA      Video-Visit Details    Type of service:  Video Visit    Video Start Time: 2:45 pm  Video End Time: 2:54 PM    Originating Location (pt. Location): Home    Distant Location (provider location):  [facility]     Platform used for Video Visit: Rachel        [facility]    Hematology/Oncology Established Patient " Note      Today's Date: 10/06/20    Reason for Follow-up: CLL      HISTORY OF PRESENT ILLNESS: Austin Garza is a 80 year old male with PMHx of DMII, HTN who presented with leukocytosis.  In November 2017, he was found to have elevated WBC of 61.7K, hemoglobin of 10.4, and platelet of 115K.  There were no other CBC results available between 2010 and 2017.  Prior to 2010, he had normal CBC, with normal to mild anemia, and mild thrombocytopenia.   He was asymptomatic.    He underwent bone marrow biopsy on 11/21/17, which was consistent with chronic lymphocytic leukemia/small lymphocytic lymphoma, with 13% ringed sideroblasts identified.  The presence of increased numbers of ringed sideroblasts raises the possibility of an associated myelodysplastic syndrome.  Dysplastic changes are not identified though.  Many cases exhibiting ringed sideroblasts are metabolic origin, without significant risk of progression to acute leukemia, and these typically do not show dysplastic morphology as is the case with this specimen.  15% ringed sideroblasts are required for a diagnosis for RARS, which this specimen does not meet.  Flow cytometry is also consistent with CLL/SLL.  Cytogenetics show two (10%) of the metaphase cells comprise a clone characterized by a deletion within the proximal long arm of a chromosome 13 as the sole karyotypic abnormality.  Three (15%) metaphases had loss of the Y chromosome as the sole abnormality.    CT scan on 11/29/17 shows mildly enlarged left axillary lymph node and periaortic lymph nodes in the retroperitoneum of the abdomen.  Spleen is also enlarged.    On his staging CT scan for CLL, he was incidentally found to have a large infrarenal abdominal aortic aneurysm, measuring 7.6 cm.  He was seen by Dr. Thomas, and he underwent EVAR on 12/4/17.     He started ibrutinib on 5/4/20.  It was held 5/28/20-6/4/20 for tooth extraction.      INTERIM HISTORY: Austin says that feels fine.  He continues to  take ibrutinib and denies any problems with it.        REVIEW OF SYSTEMS:   14 point ROS was reviewed and is negative other than as noted above in HPI.       HOME MEDICATIONS:  Current Outpatient Medications   Medication Sig Dispense Refill     aspirin (ASA) 81 MG tablet Take 1 tablet (81 mg) by mouth every evening (Patient taking differently: Take 325 mg by mouth daily ) 90 tablet 3     atorvastatin (LIPITOR) 20 MG tablet Take 1 tablet (20 mg) by mouth At Bedtime 90 tablet 3     clopidogrel (PLAVIX) 75 MG tablet TAKE 1 TABLET BY MOUTH DAILY 90 tablet 1     fluocinonide (LIDEX) 0.05 % external cream Apply topically 2 times daily as needed Itchy bug bites. Use for up to 2 weeks at a time.       ibrutinib (IMBRUVICA) 420 MG tablet Take 1 tablet (420 mg) by mouth daily 28 tablet 0     losartan-hydrochlorothiazide (HYZAAR) 50-12.5 MG tablet Take 1 tablet by mouth daily       Multiple Vitamins-Minerals (CENTRUM SILVER) per tablet Take 1 tablet by mouth daily           ALLERGIES:  Allergies   Allergen Reactions     Ace Inhibitors      cough         PAST MEDICAL HISTORY:  Past Medical History:   Diagnosis Date     AAA (abdominal aortic aneurysm)      BCC (basal cell carcinoma of skin) - face      CLL (chronic lymphocytic leukemia)      Diaphragmatic hernia      Diverticulitis of colon      Esophageal reflux      Essential hypertension      Hyperlipidemia      Irritable bowel syndrome      Macular degeneration (senile) of retina      Squamous Cell Carcinoma, Back 1988     Type 2 diabetes mellitus          PAST SURGICAL HISTORY:  Past Surgical History:   Procedure Laterality Date     APPENDECTOMY OPEN  1966     BONE MARROW BIOPSY, BONE SPECIMEN, NEEDLE/TROCAR N/A 11/21/2017    Procedure: BIOPSY BONE MARROW;  BONE MARROW BIOPSY;  Surgeon: Shady Garcia MD;  Location:  GI     COLONOSCOPY  2002     ENDOVASCULAR REPAIR ANEURYSM ABDOMINAL AORTA N/A 12/4/2017    Procedure: ENDOVASCULAR REPAIR ANEURYSM ABDOMINAL AORTA;   ENDOVASCULAR REPAIR ANEURYSM ABDOMINAL AORTA;  Surgeon: Milton Cazares MD;  Location: [facility] OR     Fistulotomy with marsupialization for repair of fisture in ano       IR ABDOMINAL AORTOGRAM  3/11/2019     IR VISCERAL EMBOLIZATION  2019     Multiple skin tags - resection  1998     Squamous cell skin cancer resection - back           SOCIAL HISTORY:  Social History     Socioeconomic History     Marital status:      Spouse name: librado     Number of children: 0     Years of education: 17     Highest education level: Not on file   Occupational History     Occupation: retired   Social Needs     Financial resource strain: Not on file     Food insecurity     Worry: Not on file     Inability: Not on file     Transportation needs     Medical: Not on file     Non-medical: Not on file   Tobacco Use     Smoking status: Former Smoker     Packs/day: 0.50     Years: 20.00     Pack years: 10.00     Quit date: 1975     Years since quittin.7     Smokeless tobacco: Former User   Substance and Sexual Activity     Alcohol use: Yes     Alcohol/week: 10.0 - 14.0 standard drinks     Types: 10 - 14 Standard drinks or equivalent per week     Comment: 2 drinks a day/wine     Drug use: No     Sexual activity: Never   Lifestyle     Physical activity     Days per week: Not on file     Minutes per session: Not on file     Stress: Not on file   Relationships     Social connections     Talks on phone: Not on file     Gets together: Not on file     Attends Church service: Not on file     Active member of club or organization: Not on file     Attends meetings of clubs or organizations: Not on file     Relationship status: Not on file     Intimate partner violence     Fear of current or ex partner: Not on file     Emotionally abused: Not on file     Physically abused: Not on file     Forced sexual activity: Not on file   Other Topics Concern     Parent/sibling w/ CABG, MI or angioplasty before 65F 55M? Not Asked    Social History Narrative     Not on file   He quit smoking in .  He drinks 1-3 glasses of wine a night with dinner.  He is retired, and used to work as a .  He lives with his wife in [place].  His cousin had lung cancer and  around age 69.  Otherwise, he denies family history of cancer.        FAMILY HISTORY:  Family History   Problem Relation Age of Onset     Cerebrovascular Disease Father         x 2     Hypertension Mother      C.A.D. Mother      Cancer Mother         skin     Eye Disorder Mother         glaucoma     Prostate Cancer No family hx of      Cancer - colorectal No family hx of      Glaucoma No family hx of      Macular Degeneration No family hx of          PHYSICAL EXAM:  ECO  GENERAL/CONSTITUTIONAL: No acute distress. Healthy, alert.  EYES: No scleral icterus.  No redness or discharge.    RESPIRATORY: No audible wheeze, cough, or visible cyanosis.  No visible retractions or increased work of breathing.  Able to speak fully in complete sentences.  MUSCULOSKELETAL: Normal range of motion.  NEUROLOGIC: Alert, oriented, answers questions appropriately. No tremor. Mentation intact and speech normal  INTEGUMENTARY: No jaundice.  No obvious rash or skin lesions.  PSYCHIATRIC:  Mentation appears normal, affect normal/bright, judgement and insight intact, normal speech and appearance well-groomed.    The rest of a comprehensive physical exam is deferred due to public health emergency video visit restrictions.        LABS:  CBC RESULTS:   Recent Labs   Lab Test 10/02/20  0956   WBC 61.2*   RBC 2.76*   HGB 9.2*   HCT 30.3*   *   MCH 33.3*   MCHC 30.4*   RDW 17.0*   *     Recent Labs   Lab Test 10/02/20  0956 20  1006    138   POTASSIUM 4.7 4.3   CHLORIDE 107 106   CO2 28 27   ANIONGAP 4 5   * 211*   BUN 39* 29   CR 1.84* 1.67*   MAGDALENO 9.3 8.9     Lab Results   Component Value Date    AST 16 10/02/2020     Lab Results   Component Value Date    ALT 18 10/02/2020      Lab Results   Component Value Date    BILICONJ 0.0 04/29/2005      Lab Results   Component Value Date    BILITOTAL 1.0 10/02/2020     Lab Results   Component Value Date    ALBUMIN 4.0 10/02/2020     Lab Results   Component Value Date    PROTTOTAL 7.0 10/02/2020      Lab Results   Component Value Date    ALKPHOS 44 10/02/2020       Component      Latest Ref Rng & Units 10/2/2020   Uric Acid      3.5 - 7.2 mg/dL 9.0 (H)   Lactate Dehydrogenase      85 - 227 U/L 220         ASSESSMENT/PLAN:  Austin Garza is a 80 year old male with:    1) CLL/SLL: BLACKWOOD stage III, with lymphocytosis, lymphadenopathy, splenomegaly, and anemia.  He has mild thrombocytopenia as well, but is above 100K.  He presented with leukocytosis with WBC of 61.7K, hemoglobin of 10.4, and platelet count of 115K on diagnosis.  Bone marrow biopsy and flow cytometry confirmed CLL/SLL.  CT scan shows mildly enlarged left axillary lymph node and periaortic lymph nodes in the retroperitoneum of the abdomen, with enlarged spleen.      Due to worsening lymphocyte count, anemia, thrombocytopenia, as well as fatigue and night sweats, he started ibrutinib on 5/4/20.      His WBC remains elevated, but has trended down some, and stable from last month.  Hemoglobin and platelet count are stable.          -continue ibrutinib.  We discussed potential side effects, including bleeding risk.  He is not on warfarin and has no history of arrhythmias.  He is on aspirin and Plavix though.  I discussed with pharmacy.  There is no contraindication, but we will monitor closely for bleeding.    -pharmacy following  -uric acid was mildly elevated, so allopurinol was started.  Due to mild acute on chronic CKD during hospitalization, it was stopped.  He likely doesn't have tumor lysis.  Will continue to hold allopurinol for now.    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 He completed radiation at [facility] with planned 60 Gy x 30 fractions. He has completed radiation and noted that he did not need any more follow-up for this.    3) Abdominal aortic aneurysm: measures up to 7.6 cm on CT scan, incidentally found.  He underwent EVAR on 12/4/17.  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  He is on Plavix now.  On 5/13/19, he underwent and percutaneous aneurysm sac puncture and endo leak embolization by IR on 5/13/19.  -follow-up with vascular surgery and IR    4) Diabetes, hypertension:  -following with PCP      Breana Perry MD  Hematology/Oncology  [facility]                              Again, thank you for allowing me to participate in the care of your patient.        Sincerely,        Breana Perry MD

## 2020-10-06 NOTE — PROGRESS NOTES
"Austin Garza is a 80 year old male who is being evaluated via a billable video visit.      The patient has been notified of following:     \"This video visit will be conducted via a call between you and your physician/provider. We have found that certain health care needs can be provided without the need for an in-person physical exam.  This service lets us provide the care you need with a video conversation.  If a prescription is necessary we can send it directly to your pharmacy.  If lab work is needed we can place an order for that and you can then stop by our lab to have the test done at a later time.    Video visits are billed at different rates depending on your insurance coverage.  Please reach out to your insurance provider with any questions.    If during the course of the call the physician/provider feels a video visit is not appropriate, you will not be charged for this service.\"    Patient has given verbal consent for Video visit? Yes  How would you like to obtain your AVS? MyChart  If you are dropped from the video visit, the video invite should be resent to: Send to e-mail at: [email or web address]     RACHEL EMAIL INVITE [email or web address]    Will anyone else be joining your video visit? No       - STOPPED ALLOPURINOL FROM ER VISIT 1 MONTH AGO    Geetha Benítez CMA      Video-Visit Details    Type of service:  Video Visit    Video Start Time: 2:45 pm  Video End Time: 2:54 PM    Originating Location (pt. Location): Home    Distant Location (provider location):  [facility]     Platform used for Video Visit: [employer]        [facility]    Hematology/Oncology Established Patient Note      Today's Date: 10/06/20    Reason for Follow-up: CLL      HISTORY OF PRESENT ILLNESS: Austin Garza is a 80 year old male with PMHx of DMII, HTN who presented with leukocytosis.  In November 2017, he was found to have elevated WBC of 61.7K, hemoglobin of 10.4, and " platelet of 115K.  There were no other CBC results available between 2010 and 2017.  Prior to 2010, he had normal CBC, with normal to mild anemia, and mild thrombocytopenia.   He was asymptomatic.    He underwent bone marrow biopsy on 11/21/17, which was consistent with chronic lymphocytic leukemia/small lymphocytic lymphoma, with 13% ringed sideroblasts identified.  The presence of increased numbers of ringed sideroblasts raises the possibility of an associated myelodysplastic syndrome.  Dysplastic changes are not identified though.  Many cases exhibiting ringed sideroblasts are metabolic origin, without significant risk of progression to acute leukemia, and these typically do not show dysplastic morphology as is the case with this specimen.  15% ringed sideroblasts are required for a diagnosis for RARS, which this specimen does not meet.  Flow cytometry is also consistent with CLL/SLL.  Cytogenetics show two (10%) of the metaphase cells comprise a clone characterized by a deletion within the proximal long arm of a chromosome 13 as the sole karyotypic abnormality.  Three (15%) metaphases had loss of the Y chromosome as the sole abnormality.    CT scan on 11/29/17 shows mildly enlarged left axillary lymph node and periaortic lymph nodes in the retroperitoneum of the abdomen.  Spleen is also enlarged.    On his staging CT scan for CLL, he was incidentally found to have a large infrarenal abdominal aortic aneurysm, measuring 7.6 cm.  He was seen by Dr. Thomas, and he underwent EVAR on 12/4/17.     He started ibrutinib on 5/4/20.  It was held 5/28/20-6/4/20 for tooth extraction.      INTERIM HISTORY: Austin says that feels fine.  He continues to take ibrutinib and denies any problems with it.        REVIEW OF SYSTEMS:   14 point ROS was reviewed and is negative other than as noted above in HPI.       HOME MEDICATIONS:  Current Outpatient Medications   Medication Sig Dispense Refill     aspirin (ASA) 81 MG tablet Take 1  tablet (81 mg) by mouth every evening (Patient taking differently: Take 325 mg by mouth daily ) 90 tablet 3     atorvastatin (LIPITOR) 20 MG tablet Take 1 tablet (20 mg) by mouth At Bedtime 90 tablet 3     clopidogrel (PLAVIX) 75 MG tablet TAKE 1 TABLET BY MOUTH DAILY 90 tablet 1     fluocinonide (LIDEX) 0.05 % external cream Apply topically 2 times daily as needed Itchy bug bites. Use for up to 2 weeks at a time.       ibrutinib (IMBRUVICA) 420 MG tablet Take 1 tablet (420 mg) by mouth daily 28 tablet 0     losartan-hydrochlorothiazide (HYZAAR) 50-12.5 MG tablet Take 1 tablet by mouth daily       Multiple Vitamins-Minerals (CENTRUM SILVER) per tablet Take 1 tablet by mouth daily           ALLERGIES:  Allergies   Allergen Reactions     Ace Inhibitors      cough         PAST MEDICAL HISTORY:  Past Medical History:   Diagnosis Date     AAA (abdominal aortic aneurysm)      BCC (basal cell carcinoma of skin) - face      CLL (chronic lymphocytic leukemia)      Diaphragmatic hernia      Diverticulitis of colon      Esophageal reflux      Essential hypertension      Hyperlipidemia      Irritable bowel syndrome      Macular degeneration (senile) of retina      Squamous Cell Carcinoma, Back 1988     Type 2 diabetes mellitus          PAST SURGICAL HISTORY:  Past Surgical History:   Procedure Laterality Date     APPENDECTOMY OPEN  1966     BONE MARROW BIOPSY, BONE SPECIMEN, NEEDLE/TROCAR N/A 11/21/2017    Procedure: BIOPSY BONE MARROW;  BONE MARROW BIOPSY;  Surgeon: Shady Garcia MD;  Location:  GI     COLONOSCOPY  2002     ENDOVASCULAR REPAIR ANEURYSM ABDOMINAL AORTA N/A 12/4/2017    Procedure: ENDOVASCULAR REPAIR ANEURYSM ABDOMINAL AORTA;  ENDOVASCULAR REPAIR ANEURYSM ABDOMINAL AORTA;  Surgeon: Milton Cazares MD;  Location:  OR     Fistulotomy with marsupialization for repair of fisture in ano  2007     IR ABDOMINAL AORTOGRAM  3/11/2019     IR VISCERAL EMBOLIZATION  5/13/2019     Multiple skin tags -  resection  1998     Squamous cell skin cancer resection - back           SOCIAL HISTORY:  Social History     Socioeconomic History     Marital status:      Spouse name: librado     Number of children: 0     Years of education: 17     Highest education level: Not on file   Occupational History     Occupation: retired   Social Needs     Financial resource strain: Not on file     Food insecurity     Worry: Not on file     Inability: Not on file     Transportation needs     Medical: Not on file     Non-medical: Not on file   Tobacco Use     Smoking status: Former Smoker     Packs/day: 0.50     Years: 20.00     Pack years: 10.00     Quit date: 1975     Years since quittin.7     Smokeless tobacco: Former User   Substance and Sexual Activity     Alcohol use: Yes     Alcohol/week: 10.0 - 14.0 standard drinks     Types: 10 - 14 Standard drinks or equivalent per week     Comment: 2 drinks a day/wine     Drug use: No     Sexual activity: Never   Lifestyle     Physical activity     Days per week: Not on file     Minutes per session: Not on file     Stress: Not on file   Relationships     Social connections     Talks on phone: Not on file     Gets together: Not on file     Attends Mosque service: Not on file     Active member of club or organization: Not on file     Attends meetings of clubs or organizations: Not on file     Relationship status: Not on file     Intimate partner violence     Fear of current or ex partner: Not on file     Emotionally abused: Not on file     Physically abused: Not on file     Forced sexual activity: Not on file   Other Topics Concern     Parent/sibling w/ CABG, MI or angioplasty before 65F 55M? Not Asked   Social History Narrative     Not on file   He quit smoking in .  He drinks 1-3 glasses of wine a night with dinner.  He is retired, and used to work as a .  He lives with his wife in [place].  His cousin had lung cancer and  around age 69.  Otherwise, he denies  family history of cancer.        FAMILY HISTORY:  Family History   Problem Relation Age of Onset     Cerebrovascular Disease Father         x 2     Hypertension Mother      C.A.D. Mother      Cancer Mother         skin     Eye Disorder Mother         glaucoma     Prostate Cancer No family hx of      Cancer - colorectal No family hx of      Glaucoma No family hx of      Macular Degeneration No family hx of          PHYSICAL EXAM:  ECO  GENERAL/CONSTITUTIONAL: No acute distress. Healthy, alert.  EYES: No scleral icterus.  No redness or discharge.    RESPIRATORY: No audible wheeze, cough, or visible cyanosis.  No visible retractions or increased work of breathing.  Able to speak fully in complete sentences.  MUSCULOSKELETAL: Normal range of motion.  NEUROLOGIC: Alert, oriented, answers questions appropriately. No tremor. Mentation intact and speech normal  INTEGUMENTARY: No jaundice.  No obvious rash or skin lesions.  PSYCHIATRIC:  Mentation appears normal, affect normal/bright, judgement and insight intact, normal speech and appearance well-groomed.    The rest of a comprehensive physical exam is deferred due to public health emergency video visit restrictions.        LABS:  CBC RESULTS:   Recent Labs   Lab Test 10/02/20  0956   WBC 61.2*   RBC 2.76*   HGB 9.2*   HCT 30.3*   *   MCH 33.3*   MCHC 30.4*   RDW 17.0*   *     Recent Labs   Lab Test 10/02/20  0956 20  1006    138   POTASSIUM 4.7 4.3   CHLORIDE 107 106   CO2 28 27   ANIONGAP 4 5   * 211*   BUN 39* 29   CR 1.84* 1.67*   MAGDALENO 9.3 8.9     Lab Results   Component Value Date    AST 16 10/02/2020     Lab Results   Component Value Date    ALT 18 10/02/2020     Lab Results   Component Value Date    BILICONJ 0.0 2005      Lab Results   Component Value Date    BILITOTAL 1.0 10/02/2020     Lab Results   Component Value Date    ALBUMIN 4.0 10/02/2020     Lab Results   Component Value Date    PROTTOTAL 7.0 10/02/2020      Lab  Results   Component Value Date    ALKPHOS 44 10/02/2020       Component      Latest Ref Rng & Units 10/2/2020   Uric Acid      3.5 - 7.2 mg/dL 9.0 (H)   Lactate Dehydrogenase      85 - 227 U/L 220         ASSESSMENT/PLAN:  Austin Garza is a 80 year old male with:    1) CLL/SLL: BLACKWOOD stage III, with lymphocytosis, lymphadenopathy, splenomegaly, and anemia.  He has mild thrombocytopenia as well, but is above 100K.  He presented with leukocytosis with WBC of 61.7K, hemoglobin of 10.4, and platelet count of 115K on diagnosis.  Bone marrow biopsy and flow cytometry confirmed CLL/SLL.  CT scan shows mildly enlarged left axillary lymph node and periaortic lymph nodes in the retroperitoneum of the abdomen, with enlarged spleen.      Due to worsening lymphocyte count, anemia, thrombocytopenia, as well as fatigue and night sweats, he started ibrutinib on 5/4/20.      His WBC remains elevated, but has trended down some, and stable from last month.  Hemoglobin and platelet count are stable.          -continue ibrutinib.  We discussed potential side effects, including bleeding risk.  He is not on warfarin and has no history of arrhythmias.  He is on aspirin and Plavix though.  I discussed with pharmacy.  There is no contraindication, but we will monitor closely for bleeding.    -pharmacy following  -uric acid was mildly elevated, so allopurinol was started.  Due to mild acute on chronic CKD during hospitalization, it was stopped.  He likely doesn't have tumor lysis.  Will continue to hold allopurinol for now.    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 He completed radiation at [facility] with planned 60 Gy x 30 fractions. He has completed radiation and noted that he did not need any more follow-up for this.    3) Abdominal aortic aneurysm: measures up to 7.6 cm on CT scan, incidentally found.  He underwent EVAR  on 12/4/17.  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  He is on Plavix now.  On 5/13/19, he underwent and percutaneous aneurysm sac puncture and endo leak embolization by IR on 5/13/19.  -follow-up with vascular surgery and IR    4) Diabetes, hypertension:  -following with PCP      Breana Perry MD  Hematology/Oncology  [facility]

## 2020-10-06 NOTE — LETTER
"    10/6/2020         RE: Austin Garza  [address] [place] [address]        Dear Colleague,    Thank you for referring your patient, Austin Garza, to the [facility]. Please see a copy of my visit note below.    Austin Garza is a 80 year old male who is being evaluated via a billable video visit.      The patient has been notified of following:     \"This video visit will be conducted via a call between you and your physician/provider. We have found that certain health care needs can be provided without the need for an in-person physical exam.  This service lets us provide the care you need with a video conversation.  If a prescription is necessary we can send it directly to your pharmacy.  If lab work is needed we can place an order for that and you can then stop by our lab to have the test done at a later time.    Video visits are billed at different rates depending on your insurance coverage.  Please reach out to your insurance provider with any questions.    If during the course of the call the physician/provider feels a video visit is not appropriate, you will not be charged for this service.\"    Patient has given verbal consent for Video visit? Yes  How would you like to obtain your AVS? MyChart  If you are dropped from the video visit, the video invite should be resent to: Send to e-mail at: [email or web address]     RACHEL EMAIL INVITE [email or web address]    Will anyone else be joining your video visit? No       - STOPPED ALLOPURINOL FROM ER VISIT 1 MONTH AGO    Geehta Benítez CMA      Video-Visit Details    Type of service:  Video Visit    Video Start Time: 2:45 pm  Video End Time: 2:54 PM    Originating Location (pt. Location): Home    Distant Location (provider location):  [facility]     Platform used for Video Visit: Rachel        [facility]    Hematology/Oncology Established Patient " Note      Today's Date: 10/06/20    Reason for Follow-up: CLL      HISTORY OF PRESENT ILLNESS: Austin Garza is a 80 year old male with PMHx of DMII, HTN who presented with leukocytosis.  In November 2017, he was found to have elevated WBC of 61.7K, hemoglobin of 10.4, and platelet of 115K.  There were no other CBC results available between 2010 and 2017.  Prior to 2010, he had normal CBC, with normal to mild anemia, and mild thrombocytopenia.   He was asymptomatic.    He underwent bone marrow biopsy on 11/21/17, which was consistent with chronic lymphocytic leukemia/small lymphocytic lymphoma, with 13% ringed sideroblasts identified.  The presence of increased numbers of ringed sideroblasts raises the possibility of an associated myelodysplastic syndrome.  Dysplastic changes are not identified though.  Many cases exhibiting ringed sideroblasts are metabolic origin, without significant risk of progression to acute leukemia, and these typically do not show dysplastic morphology as is the case with this specimen.  15% ringed sideroblasts are required for a diagnosis for RARS, which this specimen does not meet.  Flow cytometry is also consistent with CLL/SLL.  Cytogenetics show two (10%) of the metaphase cells comprise a clone characterized by a deletion within the proximal long arm of a chromosome 13 as the sole karyotypic abnormality.  Three (15%) metaphases had loss of the Y chromosome as the sole abnormality.    CT scan on 11/29/17 shows mildly enlarged left axillary lymph node and periaortic lymph nodes in the retroperitoneum of the abdomen.  Spleen is also enlarged.    On his staging CT scan for CLL, he was incidentally found to have a large infrarenal abdominal aortic aneurysm, measuring 7.6 cm.  He was seen by Dr. Thomas, and he underwent EVAR on 12/4/17.     He started ibrutinib on 5/4/20.  It was held 5/28/20-6/4/20 for tooth extraction.      INTERIM HISTORY: Austin says that feels fine.  He continues to  take ibrutinib and denies any problems with it.        REVIEW OF SYSTEMS:   14 point ROS was reviewed and is negative other than as noted above in HPI.       HOME MEDICATIONS:  Current Outpatient Medications   Medication Sig Dispense Refill     aspirin (ASA) 81 MG tablet Take 1 tablet (81 mg) by mouth every evening (Patient taking differently: Take 325 mg by mouth daily ) 90 tablet 3     atorvastatin (LIPITOR) 20 MG tablet Take 1 tablet (20 mg) by mouth At Bedtime 90 tablet 3     clopidogrel (PLAVIX) 75 MG tablet TAKE 1 TABLET BY MOUTH DAILY 90 tablet 1     fluocinonide (LIDEX) 0.05 % external cream Apply topically 2 times daily as needed Itchy bug bites. Use for up to 2 weeks at a time.       ibrutinib (IMBRUVICA) 420 MG tablet Take 1 tablet (420 mg) by mouth daily 28 tablet 0     losartan-hydrochlorothiazide (HYZAAR) 50-12.5 MG tablet Take 1 tablet by mouth daily       Multiple Vitamins-Minerals (CENTRUM SILVER) per tablet Take 1 tablet by mouth daily           ALLERGIES:  Allergies   Allergen Reactions     Ace Inhibitors      cough         PAST MEDICAL HISTORY:  Past Medical History:   Diagnosis Date     AAA (abdominal aortic aneurysm)      BCC (basal cell carcinoma of skin) - face      CLL (chronic lymphocytic leukemia)      Diaphragmatic hernia      Diverticulitis of colon      Esophageal reflux      Essential hypertension      Hyperlipidemia      Irritable bowel syndrome      Macular degeneration (senile) of retina      Squamous Cell Carcinoma, Back 1988     Type 2 diabetes mellitus          PAST SURGICAL HISTORY:  Past Surgical History:   Procedure Laterality Date     APPENDECTOMY OPEN  1966     BONE MARROW BIOPSY, BONE SPECIMEN, NEEDLE/TROCAR N/A 11/21/2017    Procedure: BIOPSY BONE MARROW;  BONE MARROW BIOPSY;  Surgeon: Shady Garcia MD;  Location:  GI     COLONOSCOPY  2002     ENDOVASCULAR REPAIR ANEURYSM ABDOMINAL AORTA N/A 12/4/2017    Procedure: ENDOVASCULAR REPAIR ANEURYSM ABDOMINAL AORTA;   ENDOVASCULAR REPAIR ANEURYSM ABDOMINAL AORTA;  Surgeon: Milton Cazares MD;  Location: [facility] OR     Fistulotomy with marsupialization for repair of fisture in ano       IR ABDOMINAL AORTOGRAM  3/11/2019     IR VISCERAL EMBOLIZATION  2019     Multiple skin tags - resection  1998     Squamous cell skin cancer resection - back           SOCIAL HISTORY:  Social History     Socioeconomic History     Marital status:      Spouse name: librado     Number of children: 0     Years of education: 17     Highest education level: Not on file   Occupational History     Occupation: retired   Social Needs     Financial resource strain: Not on file     Food insecurity     Worry: Not on file     Inability: Not on file     Transportation needs     Medical: Not on file     Non-medical: Not on file   Tobacco Use     Smoking status: Former Smoker     Packs/day: 0.50     Years: 20.00     Pack years: 10.00     Quit date: 1975     Years since quittin.7     Smokeless tobacco: Former User   Substance and Sexual Activity     Alcohol use: Yes     Alcohol/week: 10.0 - 14.0 standard drinks     Types: 10 - 14 Standard drinks or equivalent per week     Comment: 2 drinks a day/wine     Drug use: No     Sexual activity: Never   Lifestyle     Physical activity     Days per week: Not on file     Minutes per session: Not on file     Stress: Not on file   Relationships     Social connections     Talks on phone: Not on file     Gets together: Not on file     Attends Caoda[employer] service: Not on file     Active member of club or organization: Not on file     Attends meetings of clubs or organizations: Not on file     Relationship status: Not on file     Intimate partner violence     Fear of current or ex partner: Not on file     Emotionally abused: Not on file     Physically abused: Not on file     Forced sexual activity: Not on file   Other Topics Concern     Parent/sibling w/ CABG, MI or angioplasty before 65F 55M? Not Asked    Social History Narrative     Not on file   He quit smoking in .  He drinks 1-3 glasses of wine a night with dinner.  He is retired, and used to work as a .  He lives with his wife in [place].  His cousin had lung cancer and  around age 69.  Otherwise, he denies family history of cancer.        FAMILY HISTORY:  Family History   Problem Relation Age of Onset     Cerebrovascular Disease Father         x 2     Hypertension Mother      C.A.D. Mother      Cancer Mother         skin     Eye Disorder Mother         glaucoma     Prostate Cancer No family hx of      Cancer - colorectal No family hx of      Glaucoma No family hx of      Macular Degeneration No family hx of          PHYSICAL EXAM:  ECO  GENERAL/CONSTITUTIONAL: No acute distress. Healthy, alert.  EYES: No scleral icterus.  No redness or discharge.    RESPIRATORY: No audible wheeze, cough, or visible cyanosis.  No visible retractions or increased work of breathing.  Able to speak fully in complete sentences.  MUSCULOSKELETAL: Normal range of motion.  NEUROLOGIC: Alert, oriented, answers questions appropriately. No tremor. Mentation intact and speech normal  INTEGUMENTARY: No jaundice.  No obvious rash or skin lesions.  PSYCHIATRIC:  Mentation appears normal, affect normal/bright, judgement and insight intact, normal speech and appearance well-groomed.    The rest of a comprehensive physical exam is deferred due to public health emergency video visit restrictions.        LABS:  CBC RESULTS:   Recent Labs   Lab Test 10/02/20  0956   WBC 61.2*   RBC 2.76*   HGB 9.2*   HCT 30.3*   *   MCH 33.3*   MCHC 30.4*   RDW 17.0*   *     Recent Labs   Lab Test 10/02/20  0956 20  1006    138   POTASSIUM 4.7 4.3   CHLORIDE 107 106   CO2 28 27   ANIONGAP 4 5   * 211*   BUN 39* 29   CR 1.84* 1.67*   MAGDALENO 9.3 8.9     Lab Results   Component Value Date    AST 16 10/02/2020     Lab Results   Component Value Date    ALT 18 10/02/2020      Lab Results   Component Value Date    BILICONJ 0.0 04/29/2005      Lab Results   Component Value Date    BILITOTAL 1.0 10/02/2020     Lab Results   Component Value Date    ALBUMIN 4.0 10/02/2020     Lab Results   Component Value Date    PROTTOTAL 7.0 10/02/2020      Lab Results   Component Value Date    ALKPHOS 44 10/02/2020       Component      Latest Ref Rng & Units 10/2/2020   Uric Acid      3.5 - 7.2 mg/dL 9.0 (H)   Lactate Dehydrogenase      85 - 227 U/L 220         ASSESSMENT/PLAN:  Austin Garza is a 80 year old male with:    1) CLL/SLL: BLACKWOOD stage III, with lymphocytosis, lymphadenopathy, splenomegaly, and anemia.  He has mild thrombocytopenia as well, but is above 100K.  He presented with leukocytosis with WBC of 61.7K, hemoglobin of 10.4, and platelet count of 115K on diagnosis.  Bone marrow biopsy and flow cytometry confirmed CLL/SLL.  CT scan shows mildly enlarged left axillary lymph node and periaortic lymph nodes in the retroperitoneum of the abdomen, with enlarged spleen.      Due to worsening lymphocyte count, anemia, thrombocytopenia, as well as fatigue and night sweats, he started ibrutinib on 5/4/20.      His WBC remains elevated, but has trended down some, and stable from last month.  Hemoglobin and platelet count are stable.          -continue ibrutinib.  We discussed potential side effects, including bleeding risk.  He is not on warfarin and has no history of arrhythmias.  He is on aspirin and Plavix though.  I discussed with pharmacy.  There is no contraindication, but we will monitor closely for bleeding.    -pharmacy following  -uric acid was mildly elevated, so allopurinol was started.  Due to mild acute on chronic CKD during hospitalization, it was stopped.  He likely doesn't have tumor lysis.  Will continue to hold allopurinol for now.    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 He completed radiation at [facility] with planned 60 Gy x 30 fractions. He has completed radiation and noted that he did not need any more follow-up for this.    3) Abdominal aortic aneurysm: measures up to 7.6 cm on CT scan, incidentally found.  He underwent EVAR on 12/4/17.  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  He is on Plavix now.  On 5/13/19, he underwent and percutaneous aneurysm sac puncture and endo leak embolization by IR on 5/13/19.  -follow-up with vascular surgery and IR    4) Diabetes, hypertension:  -following with PCP      Breana Perry MD  Hematology/Oncology  [facility]                              Again, thank you for allowing me to participate in the care of your patient.        Sincerely,        Breana Perry MD

## 2020-10-12 DIAGNOSIS — C91.10 CLL (CHRONIC LYMPHOCYTIC LEUKEMIA) (H): Primary | ICD-10-CM

## 2020-11-03 ENCOUNTER — HOSPITAL ENCOUNTER (OUTPATIENT)
Facility: CLINIC | Age: 80
Setting detail: SPECIMEN
Discharge: HOME | End: 2020-11-03
Attending: INTERNAL MEDICINE | Admitting: INTERNAL MEDICINE
Payer: COMMERCIAL

## 2020-11-03 ENCOUNTER — PATIENT OUTREACH (OUTPATIENT)
Dept: ONCOLOGY | Facility: CLINIC | Age: 80
End: 2020-11-03

## 2020-11-03 DIAGNOSIS — C91.10 CLL (CHRONIC LYMPHOCYTIC LEUKEMIA) (H): Primary | ICD-10-CM

## 2020-11-03 DIAGNOSIS — C91.10 CLL (CHRONIC LYMPHOCYTIC LEUKEMIA) (H): ICD-10-CM

## 2020-11-03 LAB
ALBUMIN SERPL-MCNC: 3.8 G/DL (ref 3.4–5)
ALP SERPL-CCNC: 44 U/L (ref 40–150)
ALT SERPL W P-5'-P-CCNC: 17 U/L (ref 0–70)
ANION GAP SERPL CALCULATED.3IONS-SCNC: 6 MMOL/L (ref 3–14)
ANISOCYTOSIS BLD QL SMEAR: SLIGHT
AST SERPL W P-5'-P-CCNC: 17 U/L (ref 0–45)
BASOPHILS # BLD AUTO: 0 10E9/L (ref 0–0.2)
BASOPHILS NFR BLD AUTO: 0 %
BILIRUB SERPL-MCNC: 1 MG/DL (ref 0.2–1.3)
BUN SERPL-MCNC: 26 MG/DL (ref 7–30)
CALCIUM SERPL-MCNC: 8.5 MG/DL (ref 8.5–10.1)
CHLORIDE SERPL-SCNC: 105 MMOL/L (ref 94–109)
CO2 SERPL-SCNC: 26 MMOL/L (ref 20–32)
CREAT SERPL-MCNC: 1.61 MG/DL (ref 0.66–1.25)
DIFFERENTIAL METHOD BLD: ABNORMAL
EOSINOPHIL # BLD AUTO: 0 10E9/L (ref 0–0.7)
EOSINOPHIL NFR BLD AUTO: 0 %
ERYTHROCYTE [DISTWIDTH] IN BLOOD BY AUTOMATED COUNT: 16.9 % (ref 10–15)
GFR SERPL CREATININE-BSD FRML MDRD: 40 ML/MIN/{1.73_M2}
GLUCOSE SERPL-MCNC: 194 MG/DL (ref 70–99)
HCT VFR BLD AUTO: 29.1 % (ref 40–53)
HGB BLD-MCNC: 9 G/DL (ref 13.3–17.7)
LDH SERPL L TO P-CCNC: 241 U/L (ref 85–227)
LYMPHOCYTES # BLD AUTO: 49 10E9/L (ref 0.8–5.3)
LYMPHOCYTES NFR BLD AUTO: 92 %
MACROCYTES BLD QL SMEAR: PRESENT
MCH RBC QN AUTO: 33.8 PG (ref 26.5–33)
MCHC RBC AUTO-ENTMCNC: 30.9 G/DL (ref 31.5–36.5)
MCV RBC AUTO: 109 FL (ref 78–100)
MONOCYTES # BLD AUTO: 0 10E9/L (ref 0–1.3)
MONOCYTES NFR BLD AUTO: 0 %
NEUTROPHILS # BLD AUTO: 4.3 10E9/L (ref 1.6–8.3)
NEUTROPHILS NFR BLD AUTO: 8 %
PLATELET # BLD AUTO: 136 10E9/L (ref 150–450)
PLATELET # BLD EST: ABNORMAL 10*3/UL
POTASSIUM SERPL-SCNC: 4.2 MMOL/L (ref 3.4–5.3)
PROT SERPL-MCNC: 6.7 G/DL (ref 6.8–8.8)
RBC # BLD AUTO: 2.66 10E12/L (ref 4.4–5.9)
SODIUM SERPL-SCNC: 137 MMOL/L (ref 133–144)
URATE SERPL-MCNC: 7.5 MG/DL (ref 3.5–7.2)
WBC # BLD AUTO: 53.3 10E9/L (ref 4–11)

## 2020-11-03 PROCEDURE — 99207 PR NO CHARGE LOS: CPT

## 2020-11-03 PROCEDURE — 83615 LACTATE (LD) (LDH) ENZYME: CPT | Performed by: INTERNAL MEDICINE

## 2020-11-03 PROCEDURE — 85025 COMPLETE CBC W/AUTO DIFF WBC: CPT | Performed by: INTERNAL MEDICINE

## 2020-11-03 PROCEDURE — 84550 ASSAY OF BLOOD/URIC ACID: CPT | Performed by: INTERNAL MEDICINE

## 2020-11-03 PROCEDURE — 80053 COMPREHEN METABOLIC PANEL: CPT | Performed by: INTERNAL MEDICINE

## 2020-11-03 PROCEDURE — 36415 COLL VENOUS BLD VENIPUNCTURE: CPT

## 2020-11-03 NOTE — PROGRESS NOTES
Writer notified that [facility] called with critical value:    Results for MARK MORA (MRN [id number]) as of 11/3/2020 09:42   Ref. Range 11/3/2020 09:14   WBC Latest Ref Range: 4.0 - 11.0 10e9/L 53.3 ()     Writer notified Dr. Perry. No immediate interventions needed at this time.     Per chart review Mark doesn't have any follow-up scheduled with Dr. Perry. Writer sent staff message to  schedulers to schedule with Dr. Perry at her next available time slot.    Tosha Burris, RN, BSN, GARIMA  RN Care Coordinator  [facility]  [phone]

## 2020-11-04 DIAGNOSIS — C91.10 CLL (CHRONIC LYMPHOCYTIC LEUKEMIA) (H): Primary | ICD-10-CM

## 2020-11-09 ENCOUNTER — TELEPHONE (OUTPATIENT)
Dept: PEDIATRICS | Facility: CLINIC | Age: 80
End: 2020-11-09

## 2020-11-09 NOTE — LETTER
November 12, 2020      Austin Garza  [address] [place] [address]        Dear Austin,       We care about your health and have reviewed your health plan including your medical conditions, medications, and lab results.  Based on this review, it is recommended that you follow up regarding the following health topic(s):  -Wellness (Physical) Visit   -Eye Exam    We recommend you take the following action(s):  -schedule a WELLNESS (Physical) APPOINTMENT.  We will perform the following labs: Lipids (fasting cholesterol - nothing to eat except water and/or meds for 8-10 hours).  -Please send us the results of your most recent eye exam    Your provider, Sandi Eastman MD, would like to know when you last had an eye exam done?  Where was it done and approximately what date?     If you have not had one in the last year, please schedule at your earliest convenience and have them fax us the record at [phone] (fax number only).    We are now able to do annual wellness visits for people over age 65 by video visit. If you have a device which can do video (example - smart phone, ipad, computer with camera and speaker)  your appointment can be done by video.     Please call us at the [facility] - [phone] (or use [employer]) to address the above recommendations.     Thank you for trusting [facility] and we appreciate the opportunity to serve you.  We look forward to supporting your healthcare needs in the future.    Healthy Regards,    Your Health Care Team  [facility]

## 2020-11-11 ENCOUNTER — VIRTUAL VISIT (OUTPATIENT)
Dept: ONCOLOGY | Facility: CLINIC | Age: 80
End: 2020-11-11
Attending: INTERNAL MEDICINE
Payer: COMMERCIAL

## 2020-11-11 DIAGNOSIS — C91.10 CLL (CHRONIC LYMPHOCYTIC LEUKEMIA) (H): Primary | ICD-10-CM

## 2020-11-11 PROCEDURE — 999N001193 HC VIDEO/TELEPHONE VISIT; NO CHARGE

## 2020-11-11 PROCEDURE — 99212 OFFICE O/P EST SF 10 MIN: CPT | Mod: 95 | Performed by: INTERNAL MEDICINE

## 2020-11-11 NOTE — PROGRESS NOTES
"Austin Garza is a 80 year old male who is being evaluated via a billable video visit.      The patient has been notified of following:     \"This video visit will be conducted via a call between you and your physician/provider. We have found that certain health care needs can be provided without the need for an in-person physical exam.  This service lets us provide the care you need with a video conversation.  If a prescription is necessary we can send it directly to your pharmacy.  If lab work is needed we can place an order for that and you can then stop by our lab to have the test done at a later time.    Video visits are billed at different rates depending on your insurance coverage.  Please reach out to your insurance provider with any questions.    If during the course of the call the physician/provider feels a video visit is not appropriate, you will not be charged for this service.\"    Patient has given verbal consent for Video visit? Yes  How would you like to obtain your AVS? Loop[employer]     Send link to:  email [email or web address]     Will anyone else be joining your video visit? No      Video-Visit Details    Type of service:  Video Visit    Video start time: 10:50 am      Video end time: 10:55 am    Originating Location (pt. Location): Home    Distant Location (provider location):  [facility]     Platform used for Video Visit: [facility]    Hematology/Oncology Established Patient Note      Today's Date: 11/11/20    Reason for Follow-up: CLL      HISTORY OF PRESENT ILLNESS: Austin Garza is a 80 year old male with PMHx of DMII, HTN who presented with leukocytosis.  In November 2017, he was found to have elevated WBC of 61.7K, hemoglobin of 10.4, and platelet of 115K.  There were no other CBC results available between 2010 and 2017.  Prior to 2010, he had normal CBC, with normal to mild anemia, and mild " thrombocytopenia.   He was asymptomatic.    He underwent bone marrow biopsy on 11/21/17, which was consistent with chronic lymphocytic leukemia/small lymphocytic lymphoma, with 13% ringed sideroblasts identified.  The presence of increased numbers of ringed sideroblasts raises the possibility of an associated myelodysplastic syndrome.  Dysplastic changes are not identified though.  Many cases exhibiting ringed sideroblasts are metabolic origin, without significant risk of progression to acute leukemia, and these typically do not show dysplastic morphology as is the case with this specimen.  15% ringed sideroblasts are required for a diagnosis for RARS, which this specimen does not meet.  Flow cytometry is also consistent with CLL/SLL.  Cytogenetics show two (10%) of the metaphase cells comprise a clone characterized by a deletion within the proximal long arm of a chromosome 13 as the sole karyotypic abnormality.  Three (15%) metaphases had loss of the Y chromosome as the sole abnormality.    CT scan on 11/29/17 shows mildly enlarged left axillary lymph node and periaortic lymph nodes in the retroperitoneum of the abdomen.  Spleen is also enlarged.    On his staging CT scan for CLL, he was incidentally found to have a large infrarenal abdominal aortic aneurysm, measuring 7.6 cm.  He was seen by Dr. Thomas, and he underwent EVAR on 12/4/17.     He started ibrutinib on 5/4/20.  It was held 5/28/20-6/4/20 for tooth extraction.      INTERIM HISTORY: Austin say that he is doing well.  He continues to take ibrutinib without problems.        REVIEW OF SYSTEMS:   14 point ROS was reviewed and is negative other than as noted above in HPI.       HOME MEDICATIONS:  Current Outpatient Medications   Medication Sig Dispense Refill     aspirin (ASA) 81 MG tablet Take 1 tablet (81 mg) by mouth every evening (Patient taking differently: Take 325 mg by mouth daily ) 90 tablet 3     atorvastatin (LIPITOR) 20 MG tablet Take 1 tablet (20  mg) by mouth At Bedtime 90 tablet 3     clopidogrel (PLAVIX) 75 MG tablet TAKE 1 TABLET BY MOUTH DAILY 90 tablet 1     fluocinonide (LIDEX) 0.05 % external cream Apply topically 2 times daily as needed Itchy bug bites. Use for up to 2 weeks at a time.       ibrutinib (IMBRUVICA) 420 MG tablet Take 1 tablet (420 mg) by mouth daily 28 tablet 0     ibrutinib (IMBRUVICA) 420 MG tablet Take 1 tablet (420 mg) by mouth daily 28 tablet 0     ibrutinib (IMBRUVICA) 420 MG tablet Take 1 tablet (420 mg) by mouth daily 28 tablet 0     losartan-hydrochlorothiazide (HYZAAR) 50-12.5 MG tablet Take 1 tablet by mouth daily       Multiple Vitamins-Minerals (CENTRUM SILVER) per tablet Take 1 tablet by mouth daily           ALLERGIES:  Allergies   Allergen Reactions     Ace Inhibitors      cough         PAST MEDICAL HISTORY:  Past Medical History:   Diagnosis Date     AAA (abdominal aortic aneurysm)      BCC (basal cell carcinoma of skin) - face      CLL (chronic lymphocytic leukemia)      Diaphragmatic hernia      Diverticulitis of colon      Esophageal reflux      Essential hypertension      Hyperlipidemia      Irritable bowel syndrome      Macular degeneration (senile) of retina      Squamous Cell Carcinoma, Back 1988     Type 2 diabetes mellitus          PAST SURGICAL HISTORY:  Past Surgical History:   Procedure Laterality Date     APPENDECTOMY OPEN  1966     BONE MARROW BIOPSY, BONE SPECIMEN, NEEDLE/TROCAR N/A 11/21/2017    Procedure: BIOPSY BONE MARROW;  BONE MARROW BIOPSY;  Surgeon: Shady Garcia MD;  Location:  GI     COLONOSCOPY  2002     ENDOVASCULAR REPAIR ANEURYSM ABDOMINAL AORTA N/A 12/4/2017    Procedure: ENDOVASCULAR REPAIR ANEURYSM ABDOMINAL AORTA;  ENDOVASCULAR REPAIR ANEURYSM ABDOMINAL AORTA;  Surgeon: Milton Cazares MD;  Location:  OR     Fistulotomy with marsupialization for repair of fisture in ano  2007     IR ABDOMINAL AORTOGRAM  3/11/2019     IR VISCERAL EMBOLIZATION  5/13/2019     Multiple  skin tags - resection  1998     Squamous cell skin cancer resection - back           SOCIAL HISTORY:  Social History     Socioeconomic History     Marital status:      Spouse name: librado     Number of children: 0     Years of education: 17     Highest education level: Not on file   Occupational History     Occupation: retired   Social Needs     Financial resource strain: Not on file     Food insecurity     Worry: Not on file     Inability: Not on file     Transportation needs     Medical: Not on file     Non-medical: Not on file   Tobacco Use     Smoking status: Former Smoker     Packs/day: 0.50     Years: 20.00     Pack years: 10.00     Quit date: 1975     Years since quittin.8     Smokeless tobacco: Former User   Substance and Sexual Activity     Alcohol use: Yes     Alcohol/week: 10.0 - 14.0 standard drinks     Types: 10 - 14 Standard drinks or equivalent per week     Comment: 2 drinks a day/wine     Drug use: No     Sexual activity: Never   Lifestyle     Physical activity     Days per week: Not on file     Minutes per session: Not on file     Stress: Not on file   Relationships     Social connections     Talks on phone: Not on file     Gets together: Not on file     Attends [employer] service: Not on file     Active member of club or organization: Not on file     Attends meetings of clubs or organizations: Not on file     Relationship status: Not on file     Intimate partner violence     Fear of current or ex partner: Not on file     Emotionally abused: Not on file     Physically abused: Not on file     Forced sexual activity: Not on file   Other Topics Concern     Parent/sibling w/ CABG, MI or angioplasty before 65F 55M? Not Asked   Social History Narrative     Not on file   He quit smoking in .  He drinks 1-3 glasses of wine a night with dinner.  He is retired, and used to work as a .  He lives with his wife in [place].  His cousin had lung cancer and  around age 69.   Otherwise, he denies family history of cancer.        FAMILY HISTORY:  Family History   Problem Relation Age of Onset     Cerebrovascular Disease Father         x 2     Hypertension Mother      C.A.D. Mother      Cancer Mother         skin     Eye Disorder Mother         glaucoma     Prostate Cancer No family hx of      Cancer - colorectal No family hx of      Glaucoma No family hx of      Macular Degeneration No family hx of          PHYSICAL EXAM:  ECO  GENERAL/CONSTITUTIONAL: No acute distress. Healthy, alert.  EYES: No scleral icterus.  No redness or discharge.    RESPIRATORY: No audible wheeze, cough, or visible cyanosis.  No visible retractions or increased work of breathing.  Able to speak fully in complete sentences.  MUSCULOSKELETAL: Normal range of motion.  NEUROLOGIC: Alert, oriented, answers questions appropriately. No tremor. Mentation intact and speech normal  INTEGUMENTARY: No jaundice.  No obvious rash or skin lesions.  PSYCHIATRIC:  Mentation appears normal, affect normal/bright, judgement and insight intact, normal speech and appearance well-groomed.    The rest of a comprehensive physical exam is deferred due to public health emergency video visit restrictions.        LABS:  CBC RESULTS:   Recent Labs   Lab Test 20  0914   WBC 53.3*   RBC 2.66*   HGB 9.0*   HCT 29.1*   *   MCH 33.8*   MCHC 30.9*   RDW 16.9*   *     Recent Labs   Lab Test 20  0914 10/02/20  0956    139   POTASSIUM 4.2 4.7   CHLORIDE 105 107   CO2 26 28   ANIONGAP 6 4   * 160*   BUN 26 39*   CR 1.61* 1.84*   MAGDALENO 8.5 9.3     Lab Results   Component Value Date    AST 17 2020     Lab Results   Component Value Date    ALT 2020     Lab Results   Component Value Date    BILICONJ 0.0 2005      Lab Results   Component Value Date    BILITOTAL 1.0 2020     Lab Results   Component Value Date    ALBUMIN 3.8 2020     Lab Results   Component Value Date    PROTTOTAL 6.7  11/03/2020      Lab Results   Component Value Date    ALKPHOS 44 11/03/2020         ASSESSMENT/PLAN:  Austin Garza is a 80 year old male with:    1) CLL/SLL: BLACKWOOD stage III, with lymphocytosis, lymphadenopathy, splenomegaly, and anemia.  He has mild thrombocytopenia as well, but is above 100K.  He presented with leukocytosis with WBC of 61.7K, hemoglobin of 10.4, and platelet count of 115K on diagnosis.  Bone marrow biopsy and flow cytometry confirmed CLL/SLL.  CT scan shows mildly enlarged left axillary lymph node and periaortic lymph nodes in the retroperitoneum of the abdomen, with enlarged spleen.      Due to worsening lymphocyte count, anemia, thrombocytopenia, as well as fatigue and night sweats, he started ibrutinib on 5/4/20.      His WBC remains elevated, but has trended down.  Hemoglobin and platelet count are stable.          -continue ibrutinib.  We discussed potential side effects, including bleeding risk.  He is not on warfarin and has no history of arrhythmias.  He is on aspirin and Plavix though.  I discussed with pharmacy.  There is no contraindication, but we will monitor closely for bleeding.    -pharmacy following  -uric acid was mildly elevated, so allopurinol was started.  Due to mild acute on chronic CKD during hospitalization, it was stopped.  Will continue to hold allopurinol.  He is at low risk for tumor lysis; will stop checking uric acid routinely.    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 He completed radiation at [facility] with planned 60 Gy x 30 fractions. He has completed radiation and noted that he did not need any more follow-up for this.    3) Abdominal aortic aneurysm: measures up to 7.6 cm on CT scan, incidentally found.  He underwent EVAR on 12/4/17.  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  He is on Plavix now.  On 5/13/19, he  underwent and percutaneous aneurysm sac puncture and endo leak embolization by IR on 5/13/19.  -follow-up with vascular surgery and IR    4) Diabetes, hypertension:  -following with PCP      Breana Perry MD  Hematology/Oncology  [facility]

## 2020-11-11 NOTE — LETTER
"    11/11/2020         RE: Austin Garza  [address] [place] [address]        Dear Colleague,    Thank you for referring your patient, Austin Garza, to the [facility]. Please see a copy of my visit note below.    Austin Garza is a 80 year old male who is being evaluated via a billable video visit.      The patient has been notified of following:     \"This video visit will be conducted via a call between you and your physician/provider. We have found that certain health care needs can be provided without the need for an in-person physical exam.  This service lets us provide the care you need with a video conversation.  If a prescription is necessary we can send it directly to your pharmacy.  If lab work is needed we can place an order for that and you can then stop by our lab to have the test done at a later time.    Video visits are billed at different rates depending on your insurance coverage.  Please reach out to your insurance provider with any questions.    If during the course of the call the physician/provider feels a video visit is not appropriate, you will not be charged for this service.\"    Patient has given verbal consent for Video visit? Yes  How would you like to obtain your AVS? "[employer]     Send link to:  email [email or web address]     Will anyone else be joining your video visit? No      Video-Visit Details    Type of service:  Video Visit    Video start time: 10:50 am      Video end time: 10:55 am    Originating Location (pt. Location): Home    Distant Location (provider location):  [facility]     Platform used for Video Visit: Julian Chowdhury, [facility]    Hematology/Oncology Established Patient Note      Today's Date: 11/11/20    Reason for Follow-up: CLL      HISTORY OF PRESENT ILLNESS: Austin Garza is a 80 year old male with PMHx of DMII, HTN who presented with " leukocytosis.  In November 2017, he was found to have elevated WBC of 61.7K, hemoglobin of 10.4, and platelet of 115K.  There were no other CBC results available between 2010 and 2017.  Prior to 2010, he had normal CBC, with normal to mild anemia, and mild thrombocytopenia.   He was asymptomatic.    He underwent bone marrow biopsy on 11/21/17, which was consistent with chronic lymphocytic leukemia/small lymphocytic lymphoma, with 13% ringed sideroblasts identified.  The presence of increased numbers of ringed sideroblasts raises the possibility of an associated myelodysplastic syndrome.  Dysplastic changes are not identified though.  Many cases exhibiting ringed sideroblasts are metabolic origin, without significant risk of progression to acute leukemia, and these typically do not show dysplastic morphology as is the case with this specimen.  15% ringed sideroblasts are required for a diagnosis for RARS, which this specimen does not meet.  Flow cytometry is also consistent with CLL/SLL.  Cytogenetics show two (10%) of the metaphase cells comprise a clone characterized by a deletion within the proximal long arm of a chromosome 13 as the sole karyotypic abnormality.  Three (15%) metaphases had loss of the Y chromosome as the sole abnormality.    CT scan on 11/29/17 shows mildly enlarged left axillary lymph node and periaortic lymph nodes in the retroperitoneum of the abdomen.  Spleen is also enlarged.    On his staging CT scan for CLL, he was incidentally found to have a large infrarenal abdominal aortic aneurysm, measuring 7.6 cm.  He was seen by Dr. Thomas, and he underwent EVAR on 12/4/17.     He started ibrutinib on 5/4/20.  It was held 5/28/20-6/4/20 for tooth extraction.      INTERIM HISTORY: Austin say that he is doing well.  He continues to take ibrutinib without problems.        REVIEW OF SYSTEMS:   14 point ROS was reviewed and is negative other than as noted above in HPI.       HOME MEDICATIONS:  Current  Outpatient Medications   Medication Sig Dispense Refill     aspirin (ASA) 81 MG tablet Take 1 tablet (81 mg) by mouth every evening (Patient taking differently: Take 325 mg by mouth daily ) 90 tablet 3     atorvastatin (LIPITOR) 20 MG tablet Take 1 tablet (20 mg) by mouth At Bedtime 90 tablet 3     clopidogrel (PLAVIX) 75 MG tablet TAKE 1 TABLET BY MOUTH DAILY 90 tablet 1     fluocinonide (LIDEX) 0.05 % external cream Apply topically 2 times daily as needed Itchy bug bites. Use for up to 2 weeks at a time.       ibrutinib (IMBRUVICA) 420 MG tablet Take 1 tablet (420 mg) by mouth daily 28 tablet 0     ibrutinib (IMBRUVICA) 420 MG tablet Take 1 tablet (420 mg) by mouth daily 28 tablet 0     ibrutinib (IMBRUVICA) 420 MG tablet Take 1 tablet (420 mg) by mouth daily 28 tablet 0     losartan-hydrochlorothiazide (HYZAAR) 50-12.5 MG tablet Take 1 tablet by mouth daily       Multiple Vitamins-Minerals (CENTRUM SILVER) per tablet Take 1 tablet by mouth daily           ALLERGIES:  Allergies   Allergen Reactions     Ace Inhibitors      cough         PAST MEDICAL HISTORY:  Past Medical History:   Diagnosis Date     AAA (abdominal aortic aneurysm)      BCC (basal cell carcinoma of skin) - face      CLL (chronic lymphocytic leukemia)      Diaphragmatic hernia      Diverticulitis of colon      Esophageal reflux      Essential hypertension      Hyperlipidemia      Irritable bowel syndrome      Macular degeneration (senile) of retina      Squamous Cell Carcinoma, Back 1988     Type 2 diabetes mellitus          PAST SURGICAL HISTORY:  Past Surgical History:   Procedure Laterality Date     APPENDECTOMY OPEN  1966     BONE MARROW BIOPSY, BONE SPECIMEN, NEEDLE/TROCAR N/A 11/21/2017    Procedure: BIOPSY BONE MARROW;  BONE MARROW BIOPSY;  Surgeon: Shady Garcia MD;  Location:  GI     COLONOSCOPY  2002     ENDOVASCULAR REPAIR ANEURYSM ABDOMINAL AORTA N/A 12/4/2017    Procedure: ENDOVASCULAR REPAIR ANEURYSM ABDOMINAL AORTA;   ENDOVASCULAR REPAIR ANEURYSM ABDOMINAL AORTA;  Surgeon: Milton Cazares MD;  Location: [facility] OR     Fistulotomy with marsupialization for repair of fisture in ano       IR ABDOMINAL AORTOGRAM  3/11/2019     IR VISCERAL EMBOLIZATION  2019     Multiple skin tags - resection  1998     Squamous cell skin cancer resection - back           SOCIAL HISTORY:  Social History     Socioeconomic History     Marital status:      Spouse name: librado     Number of children: 0     Years of education: 17     Highest education level: Not on file   Occupational History     Occupation: retired   Social Needs     Financial resource strain: Not on file     Food insecurity     Worry: Not on file     Inability: Not on file     Transportation needs     Medical: Not on file     Non-medical: Not on file   Tobacco Use     Smoking status: Former Smoker     Packs/day: 0.50     Years: 20.00     Pack years: 10.00     Quit date: 1975     Years since quittin.8     Smokeless tobacco: Former User   Substance and Sexual Activity     Alcohol use: Yes     Alcohol/week: 10.0 - 14.0 standard drinks     Types: 10 - 14 Standard drinks or equivalent per week     Comment: 2 drinks a day/wine     Drug use: No     Sexual activity: Never   Lifestyle     Physical activity     Days per week: Not on file     Minutes per session: Not on file     Stress: Not on file   Relationships     Social connections     Talks on phone: Not on file     Gets together: Not on file     Attends Denominational service: Not on file     Active member of club or organization: Not on file     Attends meetings of clubs or organizations: Not on file     Relationship status: Not on file     Intimate partner violence     Fear of current or ex partner: Not on file     Emotionally abused: Not on file     Physically abused: Not on file     Forced sexual activity: Not on file   Other Topics Concern     Parent/sibling w/ CABG, MI or angioplasty before 65F 55M? Not Asked    Social History Narrative     Not on file   He quit smoking in .  He drinks 1-3 glasses of wine a night with dinner.  He is retired, and used to work as a .  He lives with his wife in [place].  His cousin had lung cancer and  around age 69.  Otherwise, he denies family history of cancer.        FAMILY HISTORY:  Family History   Problem Relation Age of Onset     Cerebrovascular Disease Father         x 2     Hypertension Mother      C.A.D. Mother      Cancer Mother         skin     Eye Disorder Mother         glaucoma     Prostate Cancer No family hx of      Cancer - colorectal No family hx of      Glaucoma No family hx of      Macular Degeneration No family hx of          PHYSICAL EXAM:  ECO  GENERAL/CONSTITUTIONAL: No acute distress. Healthy, alert.  EYES: No scleral icterus.  No redness or discharge.    RESPIRATORY: No audible wheeze, cough, or visible cyanosis.  No visible retractions or increased work of breathing.  Able to speak fully in complete sentences.  MUSCULOSKELETAL: Normal range of motion.  NEUROLOGIC: Alert, oriented, answers questions appropriately. No tremor. Mentation intact and speech normal  INTEGUMENTARY: No jaundice.  No obvious rash or skin lesions.  PSYCHIATRIC:  Mentation appears normal, affect normal/bright, judgement and insight intact, normal speech and appearance well-groomed.    The rest of a comprehensive physical exam is deferred due to public health emergency video visit restrictions.        LABS:  CBC RESULTS:   Recent Labs   Lab Test 20  0914   WBC 53.3*   RBC 2.66*   HGB 9.0*   HCT 29.1*   *   MCH 33.8*   MCHC 30.9*   RDW 16.9*   *     Recent Labs   Lab Test 20  0914 10/02/20  0956    139   POTASSIUM 4.2 4.7   CHLORIDE 105 107   CO2 26 28   ANIONGAP 6 4   * 160*   BUN 26 39*   CR 1.61* 1.84*   MAGDALENO 8.5 9.3     Lab Results   Component Value Date    AST 2020     Lab Results   Component Value Date    ALT 2020      Lab Results   Component Value Date    BILICONJ 0.0 04/29/2005      Lab Results   Component Value Date    BILITOTAL 1.0 11/03/2020     Lab Results   Component Value Date    ALBUMIN 3.8 11/03/2020     Lab Results   Component Value Date    PROTTOTAL 6.7 11/03/2020      Lab Results   Component Value Date    ALKPHOS 44 11/03/2020         ASSESSMENT/PLAN:  Austin Garza is a 80 year old male with:    1) CLL/SLL: BLACKWOOD stage III, with lymphocytosis, lymphadenopathy, splenomegaly, and anemia.  He has mild thrombocytopenia as well, but is above 100K.  He presented with leukocytosis with WBC of 61.7K, hemoglobin of 10.4, and platelet count of 115K on diagnosis.  Bone marrow biopsy and flow cytometry confirmed CLL/SLL.  CT scan shows mildly enlarged left axillary lymph node and periaortic lymph nodes in the retroperitoneum of the abdomen, with enlarged spleen.      Due to worsening lymphocyte count, anemia, thrombocytopenia, as well as fatigue and night sweats, he started ibrutinib on 5/4/20.      His WBC remains elevated, but has trended down.  Hemoglobin and platelet count are stable.          -continue ibrutinib.  We discussed potential side effects, including bleeding risk.  He is not on warfarin and has no history of arrhythmias.  He is on aspirin and Plavix though.  I discussed with pharmacy.  There is no contraindication, but we will monitor closely for bleeding.    -pharmacy following  -uric acid was mildly elevated, so allopurinol was started.  Due to mild acute on chronic CKD during hospitalization, it was stopped.  Will continue to hold allopurinol.  He is at low risk for tumor lysis; will stop checking uric acid routinely.    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 He completed radiation at [facility] with planned 60 Gy x 30 fractions. He has completed radiation and noted that he did not need  any more follow-up for this.    3) Abdominal aortic aneurysm: measures up to 7.6 cm on CT scan, incidentally found.  He underwent EVAR on 12/4/17.  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  He is on Plavix now.  On 5/13/19, he underwent and percutaneous aneurysm sac puncture and endo leak embolization by IR on 5/13/19.  -follow-up with vascular surgery and IR    4) Diabetes, hypertension:  -following with PCP      Breana Perry MD  Hematology/Oncology  [facility]                                  Again, thank you for allowing me to participate in the care of your patient.        Sincerely,        Breana Perry MD

## 2020-11-11 NOTE — LETTER
"    11/11/2020         RE: Austin Garza  [address] [place] [address]        Dear Colleague,    Thank you for referring your patient, Austin Garza, to the [facility]. Please see a copy of my visit note below.    Austin Garza is a 80 year old male who is being evaluated via a billable video visit.      The patient has been notified of following:     \"This video visit will be conducted via a call between you and your physician/provider. We have found that certain health care needs can be provided without the need for an in-person physical exam.  This service lets us provide the care you need with a video conversation.  If a prescription is necessary we can send it directly to your pharmacy.  If lab work is needed we can place an order for that and you can then stop by our lab to have the test done at a later time.    Video visits are billed at different rates depending on your insurance coverage.  Please reach out to your insurance provider with any questions.    If during the course of the call the physician/provider feels a video visit is not appropriate, you will not be charged for this service.\"    Patient has given verbal consent for Video visit? Yes  How would you like to obtain your AVS? [employer]     Send link to:  email [email or web address]     Will anyone else be joining your video visit? No      Video-Visit Details    Type of service:  Video Visit    Video start time: 10:50 am      Video end time: 10:55 am    Originating Location (pt. Location): Home    Distant Location (provider location):  [facility]     Platform used for Video Visit: Julian Chowdhury, [facility]    Hematology/Oncology Established Patient Note      Today's Date: 11/11/20    Reason for Follow-up: CLL      HISTORY OF PRESENT ILLNESS: Austin Garza is a 80 year old male with PMHx of DMII, HTN who presented with " leukocytosis.  In November 2017, he was found to have elevated WBC of 61.7K, hemoglobin of 10.4, and platelet of 115K.  There were no other CBC results available between 2010 and 2017.  Prior to 2010, he had normal CBC, with normal to mild anemia, and mild thrombocytopenia.   He was asymptomatic.    He underwent bone marrow biopsy on 11/21/17, which was consistent with chronic lymphocytic leukemia/small lymphocytic lymphoma, with 13% ringed sideroblasts identified.  The presence of increased numbers of ringed sideroblasts raises the possibility of an associated myelodysplastic syndrome.  Dysplastic changes are not identified though.  Many cases exhibiting ringed sideroblasts are metabolic origin, without significant risk of progression to acute leukemia, and these typically do not show dysplastic morphology as is the case with this specimen.  15% ringed sideroblasts are required for a diagnosis for RARS, which this specimen does not meet.  Flow cytometry is also consistent with CLL/SLL.  Cytogenetics show two (10%) of the metaphase cells comprise a clone characterized by a deletion within the proximal long arm of a chromosome 13 as the sole karyotypic abnormality.  Three (15%) metaphases had loss of the Y chromosome as the sole abnormality.    CT scan on 11/29/17 shows mildly enlarged left axillary lymph node and periaortic lymph nodes in the retroperitoneum of the abdomen.  Spleen is also enlarged.    On his staging CT scan for CLL, he was incidentally found to have a large infrarenal abdominal aortic aneurysm, measuring 7.6 cm.  He was seen by Dr. Thomas, and he underwent EVAR on 12/4/17.     He started ibrutinib on 5/4/20.  It was held 5/28/20-6/4/20 for tooth extraction.      INTERIM HISTORY: Austin say that he is doing well.  He continues to take ibrutinib without problems.        REVIEW OF SYSTEMS:   14 point ROS was reviewed and is negative other than as noted above in HPI.       HOME MEDICATIONS:  Current  Outpatient Medications   Medication Sig Dispense Refill     aspirin (ASA) 81 MG tablet Take 1 tablet (81 mg) by mouth every evening (Patient taking differently: Take 325 mg by mouth daily ) 90 tablet 3     atorvastatin (LIPITOR) 20 MG tablet Take 1 tablet (20 mg) by mouth At Bedtime 90 tablet 3     clopidogrel (PLAVIX) 75 MG tablet TAKE 1 TABLET BY MOUTH DAILY 90 tablet 1     fluocinonide (LIDEX) 0.05 % external cream Apply topically 2 times daily as needed Itchy bug bites. Use for up to 2 weeks at a time.       ibrutinib (IMBRUVICA) 420 MG tablet Take 1 tablet (420 mg) by mouth daily 28 tablet 0     ibrutinib (IMBRUVICA) 420 MG tablet Take 1 tablet (420 mg) by mouth daily 28 tablet 0     ibrutinib (IMBRUVICA) 420 MG tablet Take 1 tablet (420 mg) by mouth daily 28 tablet 0     losartan-hydrochlorothiazide (HYZAAR) 50-12.5 MG tablet Take 1 tablet by mouth daily       Multiple Vitamins-Minerals (CENTRUM SILVER) per tablet Take 1 tablet by mouth daily           ALLERGIES:  Allergies   Allergen Reactions     Ace Inhibitors      cough         PAST MEDICAL HISTORY:  Past Medical History:   Diagnosis Date     AAA (abdominal aortic aneurysm)      BCC (basal cell carcinoma of skin) - face      CLL (chronic lymphocytic leukemia)      Diaphragmatic hernia      Diverticulitis of colon      Esophageal reflux      Essential hypertension      Hyperlipidemia      Irritable bowel syndrome      Macular degeneration (senile) of retina      Squamous Cell Carcinoma, Back 1988     Type 2 diabetes mellitus          PAST SURGICAL HISTORY:  Past Surgical History:   Procedure Laterality Date     APPENDECTOMY OPEN  1966     BONE MARROW BIOPSY, BONE SPECIMEN, NEEDLE/TROCAR N/A 11/21/2017    Procedure: BIOPSY BONE MARROW;  BONE MARROW BIOPSY;  Surgeon: Shady Garcia MD;  Location:  GI     COLONOSCOPY  2002     ENDOVASCULAR REPAIR ANEURYSM ABDOMINAL AORTA N/A 12/4/2017    Procedure: ENDOVASCULAR REPAIR ANEURYSM ABDOMINAL AORTA;   ENDOVASCULAR REPAIR ANEURYSM ABDOMINAL AORTA;  Surgeon: Milton Cazares MD;  Location: [facility] OR     Fistulotomy with marsupialization for repair of fisture in ano       IR ABDOMINAL AORTOGRAM  3/11/2019     IR VISCERAL EMBOLIZATION  2019     Multiple skin tags - resection  1998     Squamous cell skin cancer resection - back           SOCIAL HISTORY:  Social History     Socioeconomic History     Marital status:      Spouse name: librado     Number of children: 0     Years of education: 17     Highest education level: Not on file   Occupational History     Occupation: retired   Social Needs     Financial resource strain: Not on file     Food insecurity     Worry: Not on file     Inability: Not on file     Transportation needs     Medical: Not on file     Non-medical: Not on file   Tobacco Use     Smoking status: Former Smoker     Packs/day: 0.50     Years: 20.00     Pack years: 10.00     Quit date: 1975     Years since quittin.8     Smokeless tobacco: Former User   Substance and Sexual Activity     Alcohol use: Yes     Alcohol/week: 10.0 - 14.0 standard drinks     Types: 10 - 14 Standard drinks or equivalent per week     Comment: 2 drinks a day/wine     Drug use: No     Sexual activity: Never   Lifestyle     Physical activity     Days per week: Not on file     Minutes per session: Not on file     Stress: Not on file   Relationships     Social connections     Talks on phone: Not on file     Gets together: Not on file     Attends [employer] service: Not on file     Active member of club or organization: Not on file     Attends meetings of clubs or organizations: Not on file     Relationship status: Not on file     Intimate partner violence     Fear of current or ex partner: Not on file     Emotionally abused: Not on file     Physically abused: Not on file     Forced sexual activity: Not on file   Other Topics Concern     Parent/sibling w/ CABG, MI or angioplasty before 65F 55M? Not Asked    Social History Narrative     Not on file   He quit smoking in .  He drinks 1-3 glasses of wine a night with dinner.  He is retired, and used to work as a .  He lives with his wife in [place].  His cousin had lung cancer and  around age 69.  Otherwise, he denies family history of cancer.        FAMILY HISTORY:  Family History   Problem Relation Age of Onset     Cerebrovascular Disease Father         x 2     Hypertension Mother      C.A.D. Mother      Cancer Mother         skin     Eye Disorder Mother         glaucoma     Prostate Cancer No family hx of      Cancer - colorectal No family hx of      Glaucoma No family hx of      Macular Degeneration No family hx of          PHYSICAL EXAM:  ECO  GENERAL/CONSTITUTIONAL: No acute distress. Healthy, alert.  EYES: No scleral icterus.  No redness or discharge.    RESPIRATORY: No audible wheeze, cough, or visible cyanosis.  No visible retractions or increased work of breathing.  Able to speak fully in complete sentences.  MUSCULOSKELETAL: Normal range of motion.  NEUROLOGIC: Alert, oriented, answers questions appropriately. No tremor. Mentation intact and speech normal  INTEGUMENTARY: No jaundice.  No obvious rash or skin lesions.  PSYCHIATRIC:  Mentation appears normal, affect normal/bright, judgement and insight intact, normal speech and appearance well-groomed.    The rest of a comprehensive physical exam is deferred due to public health emergency video visit restrictions.        LABS:  CBC RESULTS:   Recent Labs   Lab Test 20  0914   WBC 53.3*   RBC 2.66*   HGB 9.0*   HCT 29.1*   *   MCH 33.8*   MCHC 30.9*   RDW 16.9*   *     Recent Labs   Lab Test 20  0914 10/02/20  0956    139   POTASSIUM 4.2 4.7   CHLORIDE 105 107   CO2 26 28   ANIONGAP 6 4   * 160*   BUN 26 39*   CR 1.61* 1.84*   MAGDALENO 8.5 9.3     Lab Results   Component Value Date    AST 2020     Lab Results   Component Value Date    ALT 2020      Lab Results   Component Value Date    BILICONJ 0.0 04/29/2005      Lab Results   Component Value Date    BILITOTAL 1.0 11/03/2020     Lab Results   Component Value Date    ALBUMIN 3.8 11/03/2020     Lab Results   Component Value Date    PROTTOTAL 6.7 11/03/2020      Lab Results   Component Value Date    ALKPHOS 44 11/03/2020         ASSESSMENT/PLAN:  Austin Garza is a 80 year old male with:    1) CLL/SLL: BLACKWOOD stage III, with lymphocytosis, lymphadenopathy, splenomegaly, and anemia.  He has mild thrombocytopenia as well, but is above 100K.  He presented with leukocytosis with WBC of 61.7K, hemoglobin of 10.4, and platelet count of 115K on diagnosis.  Bone marrow biopsy and flow cytometry confirmed CLL/SLL.  CT scan shows mildly enlarged left axillary lymph node and periaortic lymph nodes in the retroperitoneum of the abdomen, with enlarged spleen.      Due to worsening lymphocyte count, anemia, thrombocytopenia, as well as fatigue and night sweats, he started ibrutinib on 5/4/20.      His WBC remains elevated, but has trended down.  Hemoglobin and platelet count are stable.          -continue ibrutinib.  We discussed potential side effects, including bleeding risk.  He is not on warfarin and has no history of arrhythmias.  He is on aspirin and Plavix though.  I discussed with pharmacy.  There is no contraindication, but we will monitor closely for bleeding.    -pharmacy following  -uric acid was mildly elevated, so allopurinol was started.  Due to mild acute on chronic CKD during hospitalization, it was stopped.  Will continue to hold allopurinol.  He is at low risk for tumor lysis; will stop checking uric acid routinely.    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 He completed radiation at [facility] with planned 60 Gy x 30 fractions. He has completed radiation and noted that he did not need  any more follow-up for this.    3) Abdominal aortic aneurysm: measures up to 7.6 cm on CT scan, incidentally found.  He underwent EVAR on 12/4/17.  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  He is on Plavix now.  On 5/13/19, he underwent and percutaneous aneurysm sac puncture and endo leak embolization by IR on 5/13/19.  -follow-up with vascular surgery and IR    4) Diabetes, hypertension:  -following with PCP      Breana Perry MD  Hematology/Oncology  [facility]                                  Again, thank you for allowing me to participate in the care of your patient.        Sincerely,        Breana Perry MD

## 2020-11-14 ENCOUNTER — HEALTH MAINTENANCE LETTER (OUTPATIENT)
Age: 80
End: 2020-11-14

## 2020-11-14 DIAGNOSIS — E11.9 TYPE 2 DIABETES MELLITUS WITHOUT COMPLICATION, WITHOUT LONG-TERM CURRENT USE OF INSULIN (H): ICD-10-CM

## 2020-11-16 NOTE — TELEPHONE ENCOUNTER
Routing refill request to provider for review/approval because:  Drug not active on patient's medication list  Alda Esquivel RN, BSN  Message handled by CLINIC NURSE.

## 2020-11-23 NOTE — TELEPHONE ENCOUNTER
Called and left VM for patient to contact clinic.  Patient needs AWV + eye exam.  Coby Allen, CMA

## 2020-12-08 ENCOUNTER — TELEPHONE (OUTPATIENT)
Dept: ONCOLOGY | Facility: CLINIC | Age: 80
End: 2020-12-08

## 2020-12-08 ENCOUNTER — HOSPITAL ENCOUNTER (OUTPATIENT)
Facility: CLINIC | Age: 80
Setting detail: SPECIMEN
Discharge: HOME | End: 2020-12-08
Attending: INTERNAL MEDICINE | Admitting: INTERNAL MEDICINE
Payer: COMMERCIAL

## 2020-12-08 DIAGNOSIS — C91.10 CLL (CHRONIC LYMPHOCYTIC LEUKEMIA) (H): Primary | ICD-10-CM

## 2020-12-08 DIAGNOSIS — C91.10 CLL (CHRONIC LYMPHOCYTIC LEUKEMIA) (H): ICD-10-CM

## 2020-12-08 LAB
ALBUMIN SERPL-MCNC: 3.8 G/DL (ref 3.4–5)
ALP SERPL-CCNC: 52 U/L (ref 40–150)
ALT SERPL W P-5'-P-CCNC: 15 U/L (ref 0–70)
ANION GAP SERPL CALCULATED.3IONS-SCNC: 1 MMOL/L (ref 3–14)
ANISOCYTOSIS BLD QL SMEAR: SLIGHT
AST SERPL W P-5'-P-CCNC: 18 U/L (ref 0–45)
BASOPHILS # BLD AUTO: 0 10E9/L (ref 0–0.2)
BASOPHILS NFR BLD AUTO: 0 %
BILIRUB SERPL-MCNC: 0.8 MG/DL (ref 0.2–1.3)
BUN SERPL-MCNC: 30 MG/DL (ref 7–30)
CALCIUM SERPL-MCNC: 9 MG/DL (ref 8.5–10.1)
CHLORIDE SERPL-SCNC: 105 MMOL/L (ref 94–109)
CO2 SERPL-SCNC: 32 MMOL/L (ref 20–32)
CREAT SERPL-MCNC: 1.54 MG/DL (ref 0.66–1.25)
DIFFERENTIAL METHOD BLD: ABNORMAL
EOSINOPHIL # BLD AUTO: 0 10E9/L (ref 0–0.7)
EOSINOPHIL NFR BLD AUTO: 0 %
ERYTHROCYTE [DISTWIDTH] IN BLOOD BY AUTOMATED COUNT: 16.7 % (ref 10–15)
GFR SERPL CREATININE-BSD FRML MDRD: 42 ML/MIN/{1.73_M2}
GLUCOSE SERPL-MCNC: 202 MG/DL (ref 70–99)
HCT VFR BLD AUTO: 28.4 % (ref 40–53)
HGB BLD-MCNC: 9.2 G/DL (ref 13.3–17.7)
LDH SERPL L TO P-CCNC: 268 U/L (ref 85–227)
LYMPHOCYTES # BLD AUTO: 35 10E9/L (ref 0.8–5.3)
LYMPHOCYTES NFR BLD AUTO: 85 %
MACROCYTES BLD QL SMEAR: PRESENT
MCH RBC QN AUTO: 35.2 PG (ref 26.5–33)
MCHC RBC AUTO-ENTMCNC: 32.4 G/DL (ref 31.5–36.5)
MCV RBC AUTO: 109 FL (ref 78–100)
MONOCYTES # BLD AUTO: 0.4 10E9/L (ref 0–1.3)
MONOCYTES NFR BLD AUTO: 1 %
NEUTROPHILS # BLD AUTO: 5.8 10E9/L (ref 1.6–8.3)
NEUTROPHILS NFR BLD AUTO: 14 %
OVALOCYTES BLD QL SMEAR: SLIGHT
PLATELET # BLD AUTO: 117 10E9/L (ref 150–450)
PLATELET # BLD EST: ABNORMAL 10*3/UL
POTASSIUM SERPL-SCNC: 4.3 MMOL/L (ref 3.4–5.3)
PROT SERPL-MCNC: 6.8 G/DL (ref 6.8–8.8)
RBC # BLD AUTO: 2.61 10E12/L (ref 4.4–5.9)
SODIUM SERPL-SCNC: 138 MMOL/L (ref 133–144)
WBC # BLD AUTO: 41.2 10E9/L (ref 4–11)

## 2020-12-08 PROCEDURE — 85025 COMPLETE CBC W/AUTO DIFF WBC: CPT | Performed by: INTERNAL MEDICINE

## 2020-12-08 PROCEDURE — 36415 COLL VENOUS BLD VENIPUNCTURE: CPT

## 2020-12-08 PROCEDURE — 83615 LACTATE (LD) (LDH) ENZYME: CPT | Performed by: INTERNAL MEDICINE

## 2020-12-08 PROCEDURE — 80053 COMPREHEN METABOLIC PANEL: CPT | Performed by: INTERNAL MEDICINE

## 2020-12-08 NOTE — ORAL ONC MGMT
Oral Chemotherapy Monitoring Program    Primary Oncologist: Dr. Perry  Primary Oncology Clinic: [facility]  Cancer Diagnosis: CLL     Drug: ibrutinib 420 mg PO daily  Start Date: 5/04/2020 5/28-6/4/2020 Held for tooth extraction  Dose is appropriate for patients: renal/ Hepatic Function        Expected duration of therapy: Until disease progression or unacceptable toxicity          Subjective/Objective:  Austin Garza is a 80 year old male contacted by phone for a follow-up visit for oral chemotherapy.  His only side effects is some mild bruising but denies any blood in the urine or stools, or any nose bleeds.  He has a history of intermittent muscle cramps but he feels these have worsened slightly since starting imbruvica.  They resolve by themselves.    ORAL CHEMOTHERAPY 4/28/2020 5/8/2020 5/21/2020 6/10/2020 11/3/2020 11/4/2020 12/8/2020   Assessment Type - - - - Refill Refill Monthly Follow up   Diagnosis Code - - - - Chronic Lymphocytic Leukemia (CLL) Chronic Lymphocytic Leukemia (CLL) Chronic Lymphocytic Leukemia (CLL)   Providers - - - - Dr. Orlando Perry   Clinic Name/Location - - - - [facility]   Drug Name Imbruvica (Ibrutinib) Imbruvica (Ibrutinib) Imbruvica (Ibrutinib) Imbruvica (Ibrutinib) Imbruvica (Ibrutinib) - Imbruvica (Ibrutinib)   Dose - - - - 420 mg - 420 mg   Current Schedule Daily Daily Daily Daily Daily - Daily   Cycle Details Continuous Continuous Continuous Continuous Continuous - Continuous   Start Date of Last Cycle - 5/4/2020 - - - - -   Planned next cycle start date - 6/1/2020 - - - - 12/14/2020   Doses missed in last 2 weeks - 0 0 more - - 0   Adherence Assessment - Adherent Adherent Non-adherent - - Adherent   Reason for Non-adherence - - - Drug on hold - - -   Adherence Intervention Recommended - - - None - - -   Adverse Effects - Diarrhea Diarrhea Fatigue;Other (see note for details) - - Other (See Note for Details)   Diarrhea - Grade 1 Grade 1 - - -  "-   Pharmacist Intervention(diarrhea) - Yes Yes - - - -   Fatigue - - - Grade 1 - - -   Pharmacist Intervention(fatigue) - - - No - - -   Other (See Note for Details) - - - bruising - - bruising, muscle cramp   Pharmacist intervention(other) - - - No - - No   Any new drug interactions? Yes No No No - - No   Pharmacist Intervention? Yes - - - - - -   Intervention(s) Patient Education - - - - - -   Is the dose as ordered appropriate for the patient? Yes Yes Yes - - - Yes   Is the patient currently in pain? No No No No - - No   Has the patient been assessed within the past 6 months for depression? Yes Yes No Yes - - No   Has the patient missed any days of school, work, or other routine activity? No No No - - - No   Since the last time we talked, have you been hospitalized or used the emergency room? - - - - - - Yes   What medical service did you use? - - - - - - Emergency Room   How many emergency room visits? - - - - - - (No Data)   How many emergency room visits were related to the cancer medication? - - - - - - 0       Last PHQ-2 Score on record:   PHQ-2 ( 1999 [employer]) 10/10/2018 12/11/2017   Q1: Little interest or pleasure in doing things 0 0   Q2: Feeling down, depressed or hopeless 0 0   PHQ-2 Score 0 0       Vitals:  BP:   BP Readings from Last 1 Encounters:   09/15/20 131/77     Wt Readings from Last 1 Encounters:   08/23/20 93.1 kg (205 lb 3.2 oz)     Estimated body surface area is 2.13 meters squared as calculated from the following:    Height as of 8/23/20: 1.753 m (5' 9\").    Weight as of 8/23/20: 93.1 kg (205 lb 3.2 oz).    Labs:  _  Result Component Current Result Ref Range   Sodium 138 (12/8/2020) 133 - 144 mmol/L     _  Result Component Current Result Ref Range   Potassium 4.3 (12/8/2020) 3.4 - 5.3 mmol/L     _  Result Component Current Result Ref Range   Calcium 9.0 (12/8/2020) 8.5 - 10.1 mg/dL     No results found for Mag within last 30 days.     No results found for Phos within last 30 days. "     _  Result Component Current Result Ref Range   Albumin 3.8 (12/8/2020) 3.4 - 5.0 g/dL     _  Result Component Current Result Ref Range   Urea Nitrogen 30 (12/8/2020) 7 - 30 mg/dL     _  Result Component Current Result Ref Range   Creatinine 1.54 (H) (12/8/2020) 0.66 - 1.25 mg/dL     _  Result Component Current Result Ref Range   AST 18 (12/8/2020) 0 - 45 U/L     _  Result Component Current Result Ref Range   ALT 15 (12/8/2020) 0 - 70 U/L     _  Result Component Current Result Ref Range   Bilirubin Total 0.8 (12/8/2020) 0.2 - 1.3 mg/dL     _  Result Component Current Result Ref Range   WBC 41.2 (H) (12/8/2020) 4.0 - 11.0 10e9/L     _  Result Component Current Result Ref Range   Hemoglobin 9.2 (L) (12/8/2020) 13.3 - 17.7 g/dL     _  Result Component Current Result Ref Range   Platelet Count 117 (L) (12/8/2020) 150 - 450 10e9/L     _  Result Component Current Result Ref Range   Absolute Neutrophil 5.8 (12/8/2020) 1.6 - 8.3 10e9/L         Assessment/Plan:  WBC improved.  Grade 1 bruising; not a problem.    Grade 1 muscle cramp of his leg; not affecting ADLs.      Follow-Up:  1/4/21 to see if labs are scheduled; he needs monthly labs per Dr. Orlando Neri Due:  1/11/21    Marty Meese, Pharm.D., BCOP

## 2020-12-09 DIAGNOSIS — C91.10 CLL (CHRONIC LYMPHOCYTIC LEUKEMIA) (H): Primary | ICD-10-CM

## 2020-12-10 ENCOUNTER — VIRTUAL VISIT (OUTPATIENT)
Dept: ONCOLOGY | Facility: CLINIC | Age: 80
End: 2020-12-10
Attending: INTERNAL MEDICINE
Payer: COMMERCIAL

## 2020-12-10 ENCOUNTER — TELEPHONE (OUTPATIENT)
Dept: PHARMACY | Facility: OTHER | Age: 80
End: 2020-12-10

## 2020-12-10 DIAGNOSIS — C91.10 CLL (CHRONIC LYMPHOCYTIC LEUKEMIA) (H): Primary | ICD-10-CM

## 2020-12-10 PROCEDURE — 99213 OFFICE O/P EST LOW 20 MIN: CPT | Mod: 95 | Performed by: INTERNAL MEDICINE

## 2020-12-10 PROCEDURE — 999N001193 HC VIDEO/TELEPHONE VISIT; NO CHARGE

## 2020-12-10 NOTE — LETTER
"    12/10/2020         RE: Austin Garza  [address] [place] [address]        Dear Colleague,    Thank you for referring your patient, Austin Garza, to the [facility]. Please see a copy of my visit note below.    Austin Garza is a 80 year old male who is being evaluated via a billable telephone visit.      The patient has been notified of following:     \"This telephone visit will be conducted via a call between you and your physician/provider. We have found that certain health care needs can be provided without the need for a physical exam.  This service lets us provide the care you need with a short phone conversation.  If a prescription is necessary we can send it directly to your pharmacy.  If lab work is needed we can place an order for that and you can then stop by our lab to have the test done at a later time.    Telephone visits are billed at different rates depending on your insurance coverage. During this emergency period, for some insurers they may be billed the same as an in-person visit.  Please reach out to your insurance provider with any questions.    If during the course of the call the physician/provider feels a telephone visit is not appropriate, you will not be charged for this service.\"    Patient has given verbal consent for Telephone visit?  Yes    What phone number would you like to be contacted at? [phone]     How would you like to obtain your AVS? Isidoro Benítez, [facility]    Hematology/Oncology Established Patient Note      Today's Date: 12/10/20    Reason for Follow-up: CLL      HISTORY OF PRESENT ILLNESS: Austin Garza is a 80 year old male with PMHx of DMII, HTN who presented with leukocytosis.  In November 2017, he was found to have elevated WBC of 61.7K, hemoglobin of 10.4, and platelet of 115K.  There were no other CBC results available between 2010 and 2017.  Prior to 2010, " he had normal CBC, with normal to mild anemia, and mild thrombocytopenia.   He was asymptomatic.    He underwent bone marrow biopsy on 11/21/17, which was consistent with chronic lymphocytic leukemia/small lymphocytic lymphoma, with 13% ringed sideroblasts identified.  The presence of increased numbers of ringed sideroblasts raises the possibility of an associated myelodysplastic syndrome.  Dysplastic changes are not identified though.  Many cases exhibiting ringed sideroblasts are metabolic origin, without significant risk of progression to acute leukemia, and these typically do not show dysplastic morphology as is the case with this specimen.  15% ringed sideroblasts are required for a diagnosis for RARS, which this specimen does not meet.  Flow cytometry is also consistent with CLL/SLL.  Cytogenetics show two (10%) of the metaphase cells comprise a clone characterized by a deletion within the proximal long arm of a chromosome 13 as the sole karyotypic abnormality.  Three (15%) metaphases had loss of the Y chromosome as the sole abnormality.    CT scan on 11/29/17 shows mildly enlarged left axillary lymph node and periaortic lymph nodes in the retroperitoneum of the abdomen.  Spleen is also enlarged.    On his staging CT scan for CLL, he was incidentally found to have a large infrarenal abdominal aortic aneurysm, measuring 7.6 cm.  He was seen by Dr. Thomas, and he underwent EVAR on 12/4/17.     He started ibrutinib on 5/4/20.  It was held 5/28/20-6/4/20 for tooth extraction.      INTERIM HISTORY: Austin is on phone visit for follow-up.  We were unable to get video to work today.  He says that he is doing fine.  He continues to take ibrutinib without issues.        REVIEW OF SYSTEMS:   14 point ROS was reviewed and is negative other than as noted above in HPI.       HOME MEDICATIONS:  Current Outpatient Medications   Medication Sig Dispense Refill     aspirin (ASA) 81 MG tablet Take 1 tablet (81 mg) by mouth every  evening (Patient taking differently: Take 325 mg by mouth daily ) 90 tablet 3     atorvastatin (LIPITOR) 20 MG tablet Take 1 tablet (20 mg) by mouth At Bedtime 90 tablet 3     clopidogrel (PLAVIX) 75 MG tablet TAKE 1 TABLET BY MOUTH DAILY 90 tablet 1     CONTOUR NEXT TEST test strip USE TO TEST BLOOD SUGAR 1-2 TIMES DAILY OR AS DIRECTED (ADJUSTING ORAL MEDICATIONS). 100 strip 1     fluocinonide (LIDEX) 0.05 % external cream Apply topically 2 times daily as needed Itchy bug bites. Use for up to 2 weeks at a time.       ibrutinib (IMBRUVICA) 420 MG tablet Take 1 tablet (420 mg) by mouth daily 28 tablet 0     losartan-hydrochlorothiazide (HYZAAR) 50-12.5 MG tablet Take 1 tablet by mouth daily       Multiple Vitamins-Minerals (CENTRUM SILVER) per tablet Take 1 tablet by mouth daily           ALLERGIES:  Allergies   Allergen Reactions     Ace Inhibitors      cough         PAST MEDICAL HISTORY:  Past Medical History:   Diagnosis Date     AAA (abdominal aortic aneurysm)      BCC (basal cell carcinoma of skin) - face      CLL (chronic lymphocytic leukemia)      Diaphragmatic hernia      Diverticulitis of colon      Esophageal reflux      Essential hypertension      Hyperlipidemia      Irritable bowel syndrome      Macular degeneration (senile) of retina      Squamous Cell Carcinoma, Back 1988     Type 2 diabetes mellitus          PAST SURGICAL HISTORY:  Past Surgical History:   Procedure Laterality Date     APPENDECTOMY OPEN  1966     BONE MARROW BIOPSY, BONE SPECIMEN, NEEDLE/TROCAR N/A 11/21/2017    Procedure: BIOPSY BONE MARROW;  BONE MARROW BIOPSY;  Surgeon: Shady Garcia MD;  Location:  GI     COLONOSCOPY  2002     ENDOVASCULAR REPAIR ANEURYSM ABDOMINAL AORTA N/A 12/4/2017    Procedure: ENDOVASCULAR REPAIR ANEURYSM ABDOMINAL AORTA;  ENDOVASCULAR REPAIR ANEURYSM ABDOMINAL AORTA;  Surgeon: Milton Cazares MD;  Location:  OR     Fistulotomy with marsupialization for repair of fisture in ano  2007      IR ABDOMINAL AORTOGRAM  3/11/2019     IR VISCERAL EMBOLIZATION  2019     Multiple skin tags - resection  1998     Squamous cell skin cancer resection - back           SOCIAL HISTORY:  Social History     Socioeconomic History     Marital status:      Spouse name: librado     Number of children: 0     Years of education: 17     Highest education level: Not on file   Occupational History     Occupation: retired   Social Needs     Financial resource strain: Not on file     Food insecurity     Worry: Not on file     Inability: Not on file     Transportation needs     Medical: Not on file     Non-medical: Not on file   Tobacco Use     Smoking status: Former Smoker     Packs/day: 0.50     Years: 20.00     Pack years: 10.00     Quit date: 1975     Years since quittin.9     Smokeless tobacco: Former User   Substance and Sexual Activity     Alcohol use: Yes     Alcohol/week: 10.0 - 14.0 standard drinks     Types: 10 - 14 Standard drinks or equivalent per week     Comment: 2 drinks a day/wine     Drug use: No     Sexual activity: Never   Lifestyle     Physical activity     Days per week: Not on file     Minutes per session: Not on file     Stress: Not on file   Relationships     Social connections     Talks on phone: Not on file     Gets together: Not on file     Attends Anabaptist service: Not on file     Active member of club or organization: Not on file     Attends meetings of clubs or organizations: Not on file     Relationship status: Not on file     Intimate partner violence     Fear of current or ex partner: Not on file     Emotionally abused: Not on file     Physically abused: Not on file     Forced sexual activity: Not on file   Other Topics Concern     Parent/sibling w/ CABG, MI or angioplasty before 65F 55M? Not Asked   Social History Narrative     Not on file   He quit smoking in .  He drinks 1-3 glasses of wine a night with dinner.  He is retired, and used to work as a .  He  lives with his wife in [place].  His cousin had lung cancer and  around age 69.  Otherwise, he denies family history of cancer.        FAMILY HISTORY:  Family History   Problem Relation Age of Onset     Cerebrovascular Disease Father         x 2     Hypertension Mother      C.A.D. Mother      Cancer Mother         skin     Eye Disorder Mother         glaucoma     Prostate Cancer No family hx of      Cancer - colorectal No family hx of      Glaucoma No family hx of      Macular Degeneration No family hx of          PHYSICAL EXAM:  Phone visit.        LABS:  CBC RESULTS:   Recent Labs   Lab Test 20  0858   WBC 41.2*   RBC 2.61*   HGB 9.2*   HCT 28.4*   *   MCH 35.2*   MCHC 32.4   RDW 16.7*   *     Recent Labs   Lab Test 20  0858 20  0914    137   POTASSIUM 4.3 4.2   CHLORIDE 105 105   CO2 32 26   ANIONGAP 1* 6   * 194*   BUN 30 26   CR 1.54* 1.61*   MAGDALENO 9.0 8.5     Lab Results   Component Value Date    AST 18 2020     Lab Results   Component Value Date    ALT 15 2020     Lab Results   Component Value Date    BILICONJ 0.0 2005      Lab Results   Component Value Date    BILITOTAL 0.8 2020     Lab Results   Component Value Date    ALBUMIN 3.8 2020     Lab Results   Component Value Date    PROTTOTAL 6.8 2020      Lab Results   Component Value Date    ALKPHOS 52 2020         ASSESSMENT/PLAN:  Austin Garza is a 80 year old male with:    1) CLL/SLL: BLACKWOOD stage III, with lymphocytosis, lymphadenopathy, splenomegaly, and anemia.  He has mild thrombocytopenia as well, but is above 100K.  He presented with leukocytosis with WBC of 61.7K, hemoglobin of 10.4, and platelet count of 115K on diagnosis.  Bone marrow biopsy and flow cytometry confirmed CLL/SLL.  CT scan shows mildly enlarged left axillary lymph node and periaortic lymph nodes in the retroperitoneum of the abdomen, with enlarged spleen.      Due to worsening lymphocyte count,  anemia, thrombocytopenia, as well as fatigue and night sweats, he started ibrutinib on 5/4/20.      His WBC remains elevated, but has trended down.  Hemoglobin and platelet count are stable.          -continue ibrutinib.  We previously discussed potential side effects, including bleeding risk.  He is not on warfarin and has no history of arrhythmias.  He is on aspirin and Plavix though.  I discussed with pharmacy.  There is no contraindication, but we will monitor closely for bleeding.    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 He completed radiation at [facility] with planned 60 Gy x 30 fractions. He has completed radiation and noted that he did not need any more follow-up for this.    3) Abdominal aortic aneurysm: measures up to 7.6 cm on CT scan, incidentally found.  He underwent EVAR on 12/4/17.  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  He is on Plavix now.  On 5/13/19, he underwent and percutaneous aneurysm sac puncture and endo leak embolization by IR on 5/13/19.  -follow-up with vascular surgery and IR    4) Diabetes, hypertension:  -following with PCP      Breana Perry MD  Hematology/Oncology  [facility]      Phone call duration: 6 minutes        Again, thank you for allowing me to participate in the care of your patient.        Sincerely,        Breana Perry MD

## 2020-12-10 NOTE — TELEPHONE ENCOUNTER
[facility] pierre has been exhausted. His copay is going to be $44.05 this month which he can afford but next month it will be much higher without any pierre assistance. His income is over the pierre guidelines (not sure how he qualified last year but maybe their program has changed). I sent him and his wife an email with the application which they have given me permission to sign on their behalf and they will get tax forms over to us.     Sending oleg to the [facility] to help obtain Ban signature on application.

## 2020-12-10 NOTE — PROGRESS NOTES
"Austin Garza is a 80 year old male who is being evaluated via a billable telephone visit.      The patient has been notified of following:     \"This telephone visit will be conducted via a call between you and your physician/provider. We have found that certain health care needs can be provided without the need for a physical exam.  This service lets us provide the care you need with a short phone conversation.  If a prescription is necessary we can send it directly to your pharmacy.  If lab work is needed we can place an order for that and you can then stop by our lab to have the test done at a later time.    Telephone visits are billed at different rates depending on your insurance coverage. During this emergency period, for some insurers they may be billed the same as an in-person visit.  Please reach out to your insurance provider with any questions.    If during the course of the call the physician/provider feels a telephone visit is not appropriate, you will not be charged for this service.\"    Patient has given verbal consent for Telephone visit?  Yes    What phone number would you like to be contacted at? [phone]     How would you like to obtain your AVS? Isidoro Benítez [facility]    Hematology/Oncology Established Patient Note      Today's Date: 12/10/20    Reason for Follow-up: CLL      HISTORY OF PRESENT ILLNESS: Austin Garza is a 80 year old male with PMHx of DMII, HTN who presented with leukocytosis.  In November 2017, he was found to have elevated WBC of 61.7K, hemoglobin of 10.4, and platelet of 115K.  There were no other CBC results available between 2010 and 2017.  Prior to 2010, he had normal CBC, with normal to mild anemia, and mild thrombocytopenia.   He was asymptomatic.    He underwent bone marrow biopsy on 11/21/17, which was consistent with chronic lymphocytic leukemia/small lymphocytic lymphoma, with 13% ringed sideroblasts " identified.  The presence of increased numbers of ringed sideroblasts raises the possibility of an associated myelodysplastic syndrome.  Dysplastic changes are not identified though.  Many cases exhibiting ringed sideroblasts are metabolic origin, without significant risk of progression to acute leukemia, and these typically do not show dysplastic morphology as is the case with this specimen.  15% ringed sideroblasts are required for a diagnosis for RARS, which this specimen does not meet.  Flow cytometry is also consistent with CLL/SLL.  Cytogenetics show two (10%) of the metaphase cells comprise a clone characterized by a deletion within the proximal long arm of a chromosome 13 as the sole karyotypic abnormality.  Three (15%) metaphases had loss of the Y chromosome as the sole abnormality.    CT scan on 11/29/17 shows mildly enlarged left axillary lymph node and periaortic lymph nodes in the retroperitoneum of the abdomen.  Spleen is also enlarged.    On his staging CT scan for CLL, he was incidentally found to have a large infrarenal abdominal aortic aneurysm, measuring 7.6 cm.  He was seen by Dr. Thomas, and he underwent EVAR on 12/4/17.     He started ibrutinib on 5/4/20.  It was held 5/28/20-6/4/20 for tooth extraction.      INTERIM HISTORY: Austin is on phone visit for follow-up.  We were unable to get video to work today.  He says that he is doing fine.  He continues to take ibrutinib without issues.        REVIEW OF SYSTEMS:   14 point ROS was reviewed and is negative other than as noted above in HPI.       HOME MEDICATIONS:  Current Outpatient Medications   Medication Sig Dispense Refill     aspirin (ASA) 81 MG tablet Take 1 tablet (81 mg) by mouth every evening (Patient taking differently: Take 325 mg by mouth daily ) 90 tablet 3     atorvastatin (LIPITOR) 20 MG tablet Take 1 tablet (20 mg) by mouth At Bedtime 90 tablet 3     clopidogrel (PLAVIX) 75 MG tablet TAKE 1 TABLET BY MOUTH DAILY 90 tablet 1      CONTOUR NEXT TEST test strip USE TO TEST BLOOD SUGAR 1-2 TIMES DAILY OR AS DIRECTED (ADJUSTING ORAL MEDICATIONS). 100 strip 1     fluocinonide (LIDEX) 0.05 % external cream Apply topically 2 times daily as needed Itchy bug bites. Use for up to 2 weeks at a time.       ibrutinib (IMBRUVICA) 420 MG tablet Take 1 tablet (420 mg) by mouth daily 28 tablet 0     losartan-hydrochlorothiazide (HYZAAR) 50-12.5 MG tablet Take 1 tablet by mouth daily       Multiple Vitamins-Minerals (CENTRUM SILVER) per tablet Take 1 tablet by mouth daily           ALLERGIES:  Allergies   Allergen Reactions     Ace Inhibitors      cough         PAST MEDICAL HISTORY:  Past Medical History:   Diagnosis Date     AAA (abdominal aortic aneurysm)      BCC (basal cell carcinoma of skin) - face      CLL (chronic lymphocytic leukemia)      Diaphragmatic hernia      Diverticulitis of colon      Esophageal reflux      Essential hypertension      Hyperlipidemia      Irritable bowel syndrome      Macular degeneration (senile) of retina      Squamous Cell Carcinoma, Back 1988     Type 2 diabetes mellitus          PAST SURGICAL HISTORY:  Past Surgical History:   Procedure Laterality Date     APPENDECTOMY OPEN  1966     BONE MARROW BIOPSY, BONE SPECIMEN, NEEDLE/TROCAR N/A 11/21/2017    Procedure: BIOPSY BONE MARROW;  BONE MARROW BIOPSY;  Surgeon: Shady Garcia MD;  Location:  GI     COLONOSCOPY  2002     ENDOVASCULAR REPAIR ANEURYSM ABDOMINAL AORTA N/A 12/4/2017    Procedure: ENDOVASCULAR REPAIR ANEURYSM ABDOMINAL AORTA;  ENDOVASCULAR REPAIR ANEURYSM ABDOMINAL AORTA;  Surgeon: Milton Cazares MD;  Location:  OR     Fistulotomy with marsupialization for repair of fisture in ano  2007     IR ABDOMINAL AORTOGRAM  3/11/2019     IR VISCERAL EMBOLIZATION  5/13/2019     Multiple skin tags - resection  1998     Squamous cell skin cancer resection - back  1985-90         SOCIAL HISTORY:  Social History     Socioeconomic History     Marital status:       Spouse name: librado     Number of children: 0     Years of education: 17     Highest education level: Not on file   Occupational History     Occupation: retired   Social Needs     Financial resource strain: Not on file     Food insecurity     Worry: Not on file     Inability: Not on file     Transportation needs     Medical: Not on file     Non-medical: Not on file   Tobacco Use     Smoking status: Former Smoker     Packs/day: 0.50     Years: 20.00     Pack years: 10.00     Quit date: 1975     Years since quittin.9     Smokeless tobacco: Former User   Substance and Sexual Activity     Alcohol use: Yes     Alcohol/week: 10.0 - 14.0 standard drinks     Types: 10 - 14 Standard drinks or equivalent per week     Comment: 2 drinks a day/wine     Drug use: No     Sexual activity: Never   Lifestyle     Physical activity     Days per week: Not on file     Minutes per session: Not on file     Stress: Not on file   Relationships     Social connections     Talks on phone: Not on file     Gets together: Not on file     Attends Anglican service: Not on file     Active member of club or organization: Not on file     Attends meetings of clubs or organizations: Not on file     Relationship status: Not on file     Intimate partner violence     Fear of current or ex partner: Not on file     Emotionally abused: Not on file     Physically abused: Not on file     Forced sexual activity: Not on file   Other Topics Concern     Parent/sibling w/ CABG, MI or angioplasty before 65F 55M? Not Asked   Social History Narrative     Not on file   He quit smoking in .  He drinks 1-3 glasses of wine a night with dinner.  He is retired, and used to work as a .  He lives with his wife in [place].  His cousin had lung cancer and  around age 69.  Otherwise, he denies family history of cancer.        FAMILY HISTORY:  Family History   Problem Relation Age of Onset     Cerebrovascular Disease Father         x 2      Hypertension Mother      C.A.D. Mother      Cancer Mother         skin     Eye Disorder Mother         glaucoma     Prostate Cancer No family hx of      Cancer - colorectal No family hx of      Glaucoma No family hx of      Macular Degeneration No family hx of          PHYSICAL EXAM:  Phone visit.        LABS:  CBC RESULTS:   Recent Labs   Lab Test 12/08/20  0858   WBC 41.2*   RBC 2.61*   HGB 9.2*   HCT 28.4*   *   MCH 35.2*   MCHC 32.4   RDW 16.7*   *     Recent Labs   Lab Test 12/08/20  0858 11/03/20  0914    137   POTASSIUM 4.3 4.2   CHLORIDE 105 105   CO2 32 26   ANIONGAP 1* 6   * 194*   BUN 30 26   CR 1.54* 1.61*   MAGDALENO 9.0 8.5     Lab Results   Component Value Date    AST 18 12/08/2020     Lab Results   Component Value Date    ALT 15 12/08/2020     Lab Results   Component Value Date    BILICONJ 0.0 04/29/2005      Lab Results   Component Value Date    BILITOTAL 0.8 12/08/2020     Lab Results   Component Value Date    ALBUMIN 3.8 12/08/2020     Lab Results   Component Value Date    PROTTOTAL 6.8 12/08/2020      Lab Results   Component Value Date    ALKPHOS 52 12/08/2020         ASSESSMENT/PLAN:  Austin Garza is a 80 year old male with:    1) CLL/SLL: BLACKWOOD stage III, with lymphocytosis, lymphadenopathy, splenomegaly, and anemia.  He has mild thrombocytopenia as well, but is above 100K.  He presented with leukocytosis with WBC of 61.7K, hemoglobin of 10.4, and platelet count of 115K on diagnosis.  Bone marrow biopsy and flow cytometry confirmed CLL/SLL.  CT scan shows mildly enlarged left axillary lymph node and periaortic lymph nodes in the retroperitoneum of the abdomen, with enlarged spleen.      Due to worsening lymphocyte count, anemia, thrombocytopenia, as well as fatigue and night sweats, he started ibrutinib on 5/4/20.      His WBC remains elevated, but has trended down.  Hemoglobin and platelet count are stable.          -continue ibrutinib.  We previously discussed potential  side effects, including bleeding risk.  He is not on warfarin and has no history of arrhythmias.  He is on aspirin and Plavix though.  I discussed with pharmacy.  There is no contraindication, but we will monitor closely for bleeding.    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 He completed radiation at [facility] with planned 60 Gy x 30 fractions. He has completed radiation and noted that he did not need any more follow-up for this.    3) Abdominal aortic aneurysm: measures up to 7.6 cm on CT scan, incidentally found.  He underwent EVAR on 12/4/17.  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  He is on Plavix now.  On 5/13/19, he underwent and percutaneous aneurysm sac puncture and endo leak embolization by IR on 5/13/19.  -follow-up with vascular surgery and IR    4) Diabetes, hypertension:  -following with PCP      Breana Perry MD  Hematology/Oncology  [facility]      Phone call duration: 6 minutes

## 2020-12-10 NOTE — LETTER
"    12/10/2020         RE: Austin Gazra  [address] [place] [address]        Dear Colleague,    Thank you for referring your patient, Austin Garza, to the [facility]. Please see a copy of my visit note below.    Austin Garza is a 80 year old male who is being evaluated via a billable telephone visit.      The patient has been notified of following:     \"This telephone visit will be conducted via a call between you and your physician/provider. We have found that certain health care needs can be provided without the need for a physical exam.  This service lets us provide the care you need with a short phone conversation.  If a prescription is necessary we can send it directly to your pharmacy.  If lab work is needed we can place an order for that and you can then stop by our lab to have the test done at a later time.    Telephone visits are billed at different rates depending on your insurance coverage. During this emergency period, for some insurers they may be billed the same as an in-person visit.  Please reach out to your insurance provider with any questions.    If during the course of the call the physician/provider feels a telephone visit is not appropriate, you will not be charged for this service.\"    Patient has given verbal consent for Telephone visit?  Yes    What phone number would you like to be contacted at? [phone]     How would you like to obtain your AVS? Isidoro Benítez, [facility]    Hematology/Oncology Established Patient Note      Today's Date: 12/10/20    Reason for Follow-up: CLL      HISTORY OF PRESENT ILLNESS: Austin Garza is a 80 year old male with PMHx of DMII, HTN who presented with leukocytosis.  In November 2017, he was found to have elevated WBC of 61.7K, hemoglobin of 10.4, and platelet of 115K.  There were no other CBC results available between 2010 and 2017.  Prior to 2010, " he had normal CBC, with normal to mild anemia, and mild thrombocytopenia.   He was asymptomatic.    He underwent bone marrow biopsy on 11/21/17, which was consistent with chronic lymphocytic leukemia/small lymphocytic lymphoma, with 13% ringed sideroblasts identified.  The presence of increased numbers of ringed sideroblasts raises the possibility of an associated myelodysplastic syndrome.  Dysplastic changes are not identified though.  Many cases exhibiting ringed sideroblasts are metabolic origin, without significant risk of progression to acute leukemia, and these typically do not show dysplastic morphology as is the case with this specimen.  15% ringed sideroblasts are required for a diagnosis for RARS, which this specimen does not meet.  Flow cytometry is also consistent with CLL/SLL.  Cytogenetics show two (10%) of the metaphase cells comprise a clone characterized by a deletion within the proximal long arm of a chromosome 13 as the sole karyotypic abnormality.  Three (15%) metaphases had loss of the Y chromosome as the sole abnormality.    CT scan on 11/29/17 shows mildly enlarged left axillary lymph node and periaortic lymph nodes in the retroperitoneum of the abdomen.  Spleen is also enlarged.    On his staging CT scan for CLL, he was incidentally found to have a large infrarenal abdominal aortic aneurysm, measuring 7.6 cm.  He was seen by Dr. Thomas, and he underwent EVAR on 12/4/17.     He started ibrutinib on 5/4/20.  It was held 5/28/20-6/4/20 for tooth extraction.      INTERIM HISTORY: Austin is on phone visit for follow-up.  We were unable to get video to work today.  He says that he is doing fine.  He continues to take ibrutinib without issues.        REVIEW OF SYSTEMS:   14 point ROS was reviewed and is negative other than as noted above in HPI.       HOME MEDICATIONS:  Current Outpatient Medications   Medication Sig Dispense Refill     aspirin (ASA) 81 MG tablet Take 1 tablet (81 mg) by mouth every  evening (Patient taking differently: Take 325 mg by mouth daily ) 90 tablet 3     atorvastatin (LIPITOR) 20 MG tablet Take 1 tablet (20 mg) by mouth At Bedtime 90 tablet 3     clopidogrel (PLAVIX) 75 MG tablet TAKE 1 TABLET BY MOUTH DAILY 90 tablet 1     CONTOUR NEXT TEST test strip USE TO TEST BLOOD SUGAR 1-2 TIMES DAILY OR AS DIRECTED (ADJUSTING ORAL MEDICATIONS). 100 strip 1     fluocinonide (LIDEX) 0.05 % external cream Apply topically 2 times daily as needed Itchy bug bites. Use for up to 2 weeks at a time.       ibrutinib (IMBRUVICA) 420 MG tablet Take 1 tablet (420 mg) by mouth daily 28 tablet 0     losartan-hydrochlorothiazide (HYZAAR) 50-12.5 MG tablet Take 1 tablet by mouth daily       Multiple Vitamins-Minerals (CENTRUM SILVER) per tablet Take 1 tablet by mouth daily           ALLERGIES:  Allergies   Allergen Reactions     Ace Inhibitors      cough         PAST MEDICAL HISTORY:  Past Medical History:   Diagnosis Date     AAA (abdominal aortic aneurysm)      BCC (basal cell carcinoma of skin) - face      CLL (chronic lymphocytic leukemia)      Diaphragmatic hernia      Diverticulitis of colon      Esophageal reflux      Essential hypertension      Hyperlipidemia      Irritable bowel syndrome      Macular degeneration (senile) of retina      Squamous Cell Carcinoma, Back 1988     Type 2 diabetes mellitus          PAST SURGICAL HISTORY:  Past Surgical History:   Procedure Laterality Date     APPENDECTOMY OPEN  1966     BONE MARROW BIOPSY, BONE SPECIMEN, NEEDLE/TROCAR N/A 11/21/2017    Procedure: BIOPSY BONE MARROW;  BONE MARROW BIOPSY;  Surgeon: Shady Garcia MD;  Location:  GI     COLONOSCOPY  2002     ENDOVASCULAR REPAIR ANEURYSM ABDOMINAL AORTA N/A 12/4/2017    Procedure: ENDOVASCULAR REPAIR ANEURYSM ABDOMINAL AORTA;  ENDOVASCULAR REPAIR ANEURYSM ABDOMINAL AORTA;  Surgeon: Milton Cazares MD;  Location:  OR     Fistulotomy with marsupialization for repair of fisture in ano  2007      IR ABDOMINAL AORTOGRAM  3/11/2019     IR VISCERAL EMBOLIZATION  2019     Multiple skin tags - resection  1998     Squamous cell skin cancer resection - back           SOCIAL HISTORY:  Social History     Socioeconomic History     Marital status:      Spouse name: librado     Number of children: 0     Years of education: 17     Highest education level: Not on file   Occupational History     Occupation: retired   Social Needs     Financial resource strain: Not on file     Food insecurity     Worry: Not on file     Inability: Not on file     Transportation needs     Medical: Not on file     Non-medical: Not on file   Tobacco Use     Smoking status: Former Smoker     Packs/day: 0.50     Years: 20.00     Pack years: 10.00     Quit date: 1975     Years since quittin.9     Smokeless tobacco: Former User   Substance and Sexual Activity     Alcohol use: Yes     Alcohol/week: 10.0 - 14.0 standard drinks     Types: 10 - 14 Standard drinks or equivalent per week     Comment: 2 drinks a day/wine     Drug use: No     Sexual activity: Never   Lifestyle     Physical activity     Days per week: Not on file     Minutes per session: Not on file     Stress: Not on file   Relationships     Social connections     Talks on phone: Not on file     Gets together: Not on file     Attends Orthodoxy service: Not on file     Active member of club or organization: Not on file     Attends meetings of clubs or organizations: Not on file     Relationship status: Not on file     Intimate partner violence     Fear of current or ex partner: Not on file     Emotionally abused: Not on file     Physically abused: Not on file     Forced sexual activity: Not on file   Other Topics Concern     Parent/sibling w/ CABG, MI or angioplasty before 65F 55M? Not Asked   Social History Narrative     Not on file   He quit smoking in .  He drinks 1-3 glasses of wine a night with dinner.  He is retired, and used to work as a .  He  lives with his wife in [place].  His cousin had lung cancer and  around age 69.  Otherwise, he denies family history of cancer.        FAMILY HISTORY:  Family History   Problem Relation Age of Onset     Cerebrovascular Disease Father         x 2     Hypertension Mother      C.A.D. Mother      Cancer Mother         skin     Eye Disorder Mother         glaucoma     Prostate Cancer No family hx of      Cancer - colorectal No family hx of      Glaucoma No family hx of      Macular Degeneration No family hx of          PHYSICAL EXAM:  Phone visit.        LABS:  CBC RESULTS:   Recent Labs   Lab Test 20  0858   WBC 41.2*   RBC 2.61*   HGB 9.2*   HCT 28.4*   *   MCH 35.2*   MCHC 32.4   RDW 16.7*   *     Recent Labs   Lab Test 20  0858 20  0914    137   POTASSIUM 4.3 4.2   CHLORIDE 105 105   CO2 32 26   ANIONGAP 1* 6   * 194*   BUN 30 26   CR 1.54* 1.61*   MAGDALENO 9.0 8.5     Lab Results   Component Value Date    AST 18 2020     Lab Results   Component Value Date    ALT 15 2020     Lab Results   Component Value Date    BILICONJ 0.0 2005      Lab Results   Component Value Date    BILITOTAL 0.8 2020     Lab Results   Component Value Date    ALBUMIN 3.8 2020     Lab Results   Component Value Date    PROTTOTAL 6.8 2020      Lab Results   Component Value Date    ALKPHOS 52 2020         ASSESSMENT/PLAN:  Austin Garza is a 80 year old male with:    1) CLL/SLL: BLACKWOOD stage III, with lymphocytosis, lymphadenopathy, splenomegaly, and anemia.  He has mild thrombocytopenia as well, but is above 100K.  He presented with leukocytosis with WBC of 61.7K, hemoglobin of 10.4, and platelet count of 115K on diagnosis.  Bone marrow biopsy and flow cytometry confirmed CLL/SLL.  CT scan shows mildly enlarged left axillary lymph node and periaortic lymph nodes in the retroperitoneum of the abdomen, with enlarged spleen.      Due to worsening lymphocyte count,  anemia, thrombocytopenia, as well as fatigue and night sweats, he started ibrutinib on 5/4/20.      His WBC remains elevated, but has trended down.  Hemoglobin and platelet count are stable.          -continue ibrutinib.  We previously discussed potential side effects, including bleeding risk.  He is not on warfarin and has no history of arrhythmias.  He is on aspirin and Plavix though.  I discussed with pharmacy.  There is no contraindication, but we will monitor closely for bleeding.    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 He completed radiation at [facility] with planned 60 Gy x 30 fractions. He has completed radiation and noted that he did not need any more follow-up for this.    3) Abdominal aortic aneurysm: measures up to 7.6 cm on CT scan, incidentally found.  He underwent EVAR on 12/4/17.  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  He is on Plavix now.  On 5/13/19, he underwent and percutaneous aneurysm sac puncture and endo leak embolization by IR on 5/13/19.  -follow-up with vascular surgery and IR    4) Diabetes, hypertension:  -following with PCP      Breana Perry MD  Hematology/Oncology  [facility]      Phone call duration: 6 minutes        Again, thank you for allowing me to participate in the care of your patient.        Sincerely,        Breana Perry MD

## 2020-12-11 NOTE — TELEPHONE ENCOUNTER
Free Drug Application Initiated  Medication: Imbruvica  Sponsor: [employer]  Phone #  Fax #[phone]  Additional Information : Need prescriber signature, faxed in the oleg without it for now (pt gave consent to sign on his behalf) and faxed in his tax forms that he emailed to me yesterday late afternoon.     Pt is aware he will have to spend 4% of his income (likely January fill) in order for [facility] to accept him into the program.

## 2020-12-17 NOTE — PATIENT INSTRUCTIONS
Labs scheduled 1/5, 2/1  Appt with Dr. Perry scheduled 2/2  Tosha Burris RN on 12/17/2020 at 8:12 AM

## 2020-12-28 DIAGNOSIS — I10 HYPERTENSION GOAL BP (BLOOD PRESSURE) < 140/90: ICD-10-CM

## 2020-12-30 RX ORDER — LOSARTAN POTASSIUM AND HYDROCHLOROTHIAZIDE 12.5; 5 MG/1; MG/1
TABLET ORAL
Qty: 90 TABLET | Refills: 3 | Status: ON HOLD | OUTPATIENT
Start: 2020-12-30 | End: 2021-08-02

## 2020-12-30 NOTE — TELEPHONE ENCOUNTER
Routing refill request to provider for review/approval because:  Labs out of range:  creatinine         No

## 2021-01-03 DIAGNOSIS — E11.9 TYPE 2 DIABETES MELLITUS WITHOUT COMPLICATION, WITHOUT LONG-TERM CURRENT USE OF INSULIN (H): ICD-10-CM

## 2021-01-05 ENCOUNTER — HOSPITAL ENCOUNTER (OUTPATIENT)
Facility: CLINIC | Age: 81
Setting detail: SPECIMEN
Discharge: HOME | End: 2021-01-05
Attending: INTERNAL MEDICINE | Admitting: INTERNAL MEDICINE
Payer: COMMERCIAL

## 2021-01-05 DIAGNOSIS — C91.10 CLL (CHRONIC LYMPHOCYTIC LEUKEMIA) (H): ICD-10-CM

## 2021-01-05 LAB
ALBUMIN SERPL-MCNC: 3.9 G/DL (ref 3.4–5)
ALP SERPL-CCNC: 50 U/L (ref 40–150)
ALT SERPL W P-5'-P-CCNC: 12 U/L (ref 0–70)
ANION GAP SERPL CALCULATED.3IONS-SCNC: 3 MMOL/L (ref 3–14)
ANISOCYTOSIS BLD QL SMEAR: SLIGHT
AST SERPL W P-5'-P-CCNC: 11 U/L (ref 0–45)
BASOPHILS # BLD AUTO: 0 10E9/L (ref 0–0.2)
BASOPHILS NFR BLD AUTO: 0 %
BILIRUB SERPL-MCNC: 0.9 MG/DL (ref 0.2–1.3)
BUN SERPL-MCNC: 31 MG/DL (ref 7–30)
CALCIUM SERPL-MCNC: 9.3 MG/DL (ref 8.5–10.1)
CHLORIDE SERPL-SCNC: 104 MMOL/L (ref 94–109)
CO2 SERPL-SCNC: 31 MMOL/L (ref 20–32)
CREAT SERPL-MCNC: 1.61 MG/DL (ref 0.66–1.25)
DIFFERENTIAL METHOD BLD: ABNORMAL
EOSINOPHIL # BLD AUTO: 0 10E9/L (ref 0–0.7)
EOSINOPHIL NFR BLD AUTO: 0 %
ERYTHROCYTE [DISTWIDTH] IN BLOOD BY AUTOMATED COUNT: 15.8 % (ref 10–15)
GFR SERPL CREATININE-BSD FRML MDRD: 40 ML/MIN/{1.73_M2}
GLUCOSE SERPL-MCNC: 192 MG/DL (ref 70–99)
HCT VFR BLD AUTO: 29.7 % (ref 40–53)
HGB BLD-MCNC: 9.7 G/DL (ref 13.3–17.7)
LDH SERPL L TO P-CCNC: 203 U/L (ref 85–227)
LYMPHOCYTES # BLD AUTO: 25.3 10E9/L (ref 0.8–5.3)
LYMPHOCYTES NFR BLD AUTO: 87 %
MACROCYTES BLD QL SMEAR: PRESENT
MCH RBC QN AUTO: 35.3 PG (ref 26.5–33)
MCHC RBC AUTO-ENTMCNC: 32.7 G/DL (ref 31.5–36.5)
MCV RBC AUTO: 108 FL (ref 78–100)
MONOCYTES # BLD AUTO: 0.3 10E9/L (ref 0–1.3)
MONOCYTES NFR BLD AUTO: 1 %
NEUTROPHILS # BLD AUTO: 3.5 10E9/L (ref 1.6–8.3)
NEUTROPHILS NFR BLD AUTO: 12 %
NRBC # BLD AUTO: 0.3 10*3/UL
NRBC BLD AUTO-RTO: 1 /100
OVALOCYTES BLD QL SMEAR: SLIGHT
PLATELET # BLD AUTO: 112 10E9/L (ref 150–450)
PLATELET # BLD EST: ABNORMAL 10*3/UL
POTASSIUM SERPL-SCNC: 4.2 MMOL/L (ref 3.4–5.3)
PROT SERPL-MCNC: 6.8 G/DL (ref 6.8–8.8)
RBC # BLD AUTO: 2.75 10E12/L (ref 4.4–5.9)
RBC INCLUSIONS BLD: SLIGHT
SODIUM SERPL-SCNC: 138 MMOL/L (ref 133–144)
WBC # BLD AUTO: 29.1 10E9/L (ref 4–11)

## 2021-01-05 PROCEDURE — 36415 COLL VENOUS BLD VENIPUNCTURE: CPT

## 2021-01-05 PROCEDURE — 83615 LACTATE (LD) (LDH) ENZYME: CPT | Performed by: INTERNAL MEDICINE

## 2021-01-05 PROCEDURE — 80053 COMPREHEN METABOLIC PANEL: CPT | Performed by: INTERNAL MEDICINE

## 2021-01-05 PROCEDURE — 85025 COMPLETE CBC W/AUTO DIFF WBC: CPT | Performed by: INTERNAL MEDICINE

## 2021-01-05 RX ORDER — ATORVASTATIN CALCIUM 20 MG/1
TABLET, FILM COATED ORAL
Qty: 90 TABLET | Refills: 0 | Status: SHIPPED | OUTPATIENT
Start: 2021-01-05 | End: 2021-01-15

## 2021-01-05 NOTE — TELEPHONE ENCOUNTER
3 month breana refill approved.     Routing refill request to provider for review/approval because:  Labs not current:  LDL    Enedelia Galo RN on 1/5/2021 at 10:46 AM

## 2021-01-10 DIAGNOSIS — C91.10 CLL (CHRONIC LYMPHOCYTIC LEUKEMIA) (H): Primary | ICD-10-CM

## 2021-01-11 DIAGNOSIS — C91.10 CLL (CHRONIC LYMPHOCYTIC LEUKEMIA) (H): Primary | ICD-10-CM

## 2021-01-13 DIAGNOSIS — E11.9 TYPE 2 DIABETES MELLITUS WITHOUT COMPLICATION, WITHOUT LONG-TERM CURRENT USE OF INSULIN (H): ICD-10-CM

## 2021-01-15 DIAGNOSIS — E11.9 TYPE 2 DIABETES MELLITUS WITHOUT COMPLICATION, WITHOUT LONG-TERM CURRENT USE OF INSULIN (H): ICD-10-CM

## 2021-01-15 LAB — HBA1C MFR BLD: 6.7 % (ref 0–5.6)

## 2021-01-15 PROCEDURE — 83036 HEMOGLOBIN GLYCOSYLATED A1C: CPT | Performed by: INTERNAL MEDICINE

## 2021-01-15 PROCEDURE — 36415 COLL VENOUS BLD VENIPUNCTURE: CPT | Performed by: INTERNAL MEDICINE

## 2021-01-15 PROCEDURE — 80061 LIPID PANEL: CPT | Performed by: INTERNAL MEDICINE

## 2021-01-15 RX ORDER — ATORVASTATIN CALCIUM 20 MG/1
TABLET, FILM COATED ORAL
Qty: 90 TABLET | Refills: 0 | Status: SHIPPED | OUTPATIENT
Start: 2021-01-15 | End: 2021-07-12

## 2021-01-15 NOTE — TELEPHONE ENCOUNTER
Routing refill request to provider for review/approval because:  Labs not current:  LDL    Tosha Cifuentes RN on 1/15/2021 at 6:48 AM

## 2021-01-16 LAB
CHOLEST SERPL-MCNC: 109 MG/DL
HDLC SERPL-MCNC: 51 MG/DL
LDLC SERPL CALC-MCNC: 27 MG/DL
NONHDLC SERPL-MCNC: 58 MG/DL
TRIGL SERPL-MCNC: 157 MG/DL

## 2021-02-01 ENCOUNTER — HOSPITAL ENCOUNTER (OUTPATIENT)
Facility: CLINIC | Age: 81
Setting detail: SPECIMEN
Discharge: HOME | End: 2021-02-01
Attending: INTERNAL MEDICINE | Admitting: INTERNAL MEDICINE
Payer: COMMERCIAL

## 2021-02-01 DIAGNOSIS — C91.10 CLL (CHRONIC LYMPHOCYTIC LEUKEMIA) (H): ICD-10-CM

## 2021-02-01 LAB
ALBUMIN SERPL-MCNC: 3.8 G/DL (ref 3.4–5)
ALP SERPL-CCNC: 53 U/L (ref 40–150)
ALT SERPL W P-5'-P-CCNC: 14 U/L (ref 0–70)
ANION GAP SERPL CALCULATED.3IONS-SCNC: 5 MMOL/L (ref 3–14)
AST SERPL W P-5'-P-CCNC: 12 U/L (ref 0–45)
BASOPHILS # BLD AUTO: 0 10E9/L (ref 0–0.2)
BASOPHILS NFR BLD AUTO: 0 %
BILIRUB SERPL-MCNC: 0.9 MG/DL (ref 0.2–1.3)
BUN SERPL-MCNC: 29 MG/DL (ref 7–30)
CALCIUM SERPL-MCNC: 9.5 MG/DL (ref 8.5–10.1)
CHLORIDE SERPL-SCNC: 104 MMOL/L (ref 94–109)
CO2 SERPL-SCNC: 28 MMOL/L (ref 20–32)
CREAT SERPL-MCNC: 1.56 MG/DL (ref 0.66–1.25)
DIFFERENTIAL METHOD BLD: ABNORMAL
EOSINOPHIL # BLD AUTO: 0 10E9/L (ref 0–0.7)
EOSINOPHIL NFR BLD AUTO: 0 %
ERYTHROCYTE [DISTWIDTH] IN BLOOD BY AUTOMATED COUNT: 14.6 % (ref 10–15)
GFR SERPL CREATININE-BSD FRML MDRD: 42 ML/MIN/{1.73_M2}
GLUCOSE SERPL-MCNC: 231 MG/DL (ref 70–99)
HCT VFR BLD AUTO: 32.8 % (ref 40–53)
HGB BLD-MCNC: 10.5 G/DL (ref 13.3–17.7)
LDH SERPL L TO P-CCNC: 182 U/L (ref 85–227)
LYMPHOCYTES # BLD AUTO: 19.1 10E9/L (ref 0.8–5.3)
LYMPHOCYTES NFR BLD AUTO: 95 %
MACROCYTES BLD QL SMEAR: PRESENT
MCH RBC QN AUTO: 35.2 PG (ref 26.5–33)
MCHC RBC AUTO-ENTMCNC: 32 G/DL (ref 31.5–36.5)
MCV RBC AUTO: 110 FL (ref 78–100)
MONOCYTES # BLD AUTO: 0.2 10E9/L (ref 0–1.3)
MONOCYTES NFR BLD AUTO: 1 %
NEUTROPHILS # BLD AUTO: 0.8 10E9/L (ref 1.6–8.3)
NEUTROPHILS NFR BLD AUTO: 4 %
OVALOCYTES BLD QL SMEAR: SLIGHT
PLATELET # BLD AUTO: 107 10E9/L (ref 150–450)
PLATELET # BLD EST: ABNORMAL 10*3/UL
POTASSIUM SERPL-SCNC: 4.4 MMOL/L (ref 3.4–5.3)
PROT SERPL-MCNC: 6.7 G/DL (ref 6.8–8.8)
RBC # BLD AUTO: 2.98 10E12/L (ref 4.4–5.9)
RBC INCLUSIONS BLD: SLIGHT
SMUDGE CELLS BLD QL SMEAR: PRESENT
SODIUM SERPL-SCNC: 137 MMOL/L (ref 133–144)
WBC # BLD AUTO: 20.1 10E9/L (ref 4–11)

## 2021-02-01 PROCEDURE — 36415 COLL VENOUS BLD VENIPUNCTURE: CPT

## 2021-02-01 PROCEDURE — 85025 COMPLETE CBC W/AUTO DIFF WBC: CPT | Performed by: INTERNAL MEDICINE

## 2021-02-01 PROCEDURE — 80053 COMPREHEN METABOLIC PANEL: CPT | Performed by: INTERNAL MEDICINE

## 2021-02-01 PROCEDURE — 83615 LACTATE (LD) (LDH) ENZYME: CPT | Performed by: INTERNAL MEDICINE

## 2021-02-02 ENCOUNTER — VIRTUAL VISIT (OUTPATIENT)
Dept: ONCOLOGY | Facility: CLINIC | Age: 81
End: 2021-02-02
Attending: INTERNAL MEDICINE
Payer: COMMERCIAL

## 2021-02-02 DIAGNOSIS — C91.10 CLL (CHRONIC LYMPHOCYTIC LEUKEMIA) (H): Primary | ICD-10-CM

## 2021-02-02 PROCEDURE — 999N001193 HC VIDEO/TELEPHONE VISIT; NO CHARGE

## 2021-02-02 PROCEDURE — 99214 OFFICE O/P EST MOD 30 MIN: CPT | Mod: 95 | Performed by: INTERNAL MEDICINE

## 2021-02-02 NOTE — LETTER
2/2/2021         RE: Austin Garza  [address] [place] [address]        Dear Colleague,    Thank you for referring your patient, Austin Garza, to the [facility]. Please see a copy of my visit note below.    Austin is a 80 year old who is being evaluated via a billable video visit.      How would you like to obtain your AVS? MyChart  If the video visit is dropped, the invitation should be resent by: Text to cell phone: [employer] TEXT INVITE [phone]   Will anyone else be joining your video visit? No    Geetha Benítez CMA      Video Start Time: 3:39 PM    Video-Visit Details    Type of service:  Video Visit    Video End Time:3:44 PM    Originating Location (pt. Location): Home    Distant Location (provider location):  [facility]     Platform used for Video Visit: cooala - your brands             [facility]    Hematology/Oncology Established Patient Note      Today's Date: 2/2/21    Reason for Follow-up: CLL      HISTORY OF PRESENT ILLNESS: Austin Garza is a 80 year old male with PMHx of DMII, HTN who presented with leukocytosis.  In November 2017, he was found to have elevated WBC of 61.7K, hemoglobin of 10.4, and platelet of 115K.  There were no other CBC results available between 2010 and 2017.  Prior to 2010, he had normal CBC, with normal to mild anemia, and mild thrombocytopenia.   He was asymptomatic.    He underwent bone marrow biopsy on 11/21/17, which was consistent with chronic lymphocytic leukemia/small lymphocytic lymphoma, with 13% ringed sideroblasts identified.  The presence of increased numbers of ringed sideroblasts raises the possibility of an associated myelodysplastic syndrome.  Dysplastic changes are not identified though.  Many cases exhibiting ringed sideroblasts are metabolic origin, without significant risk of progression to acute leukemia, and these typically do not show dysplastic  morphology as is the case with this specimen.  15% ringed sideroblasts are required for a diagnosis for RARS, which this specimen does not meet.  Flow cytometry is also consistent with CLL/SLL.  Cytogenetics show two (10%) of the metaphase cells comprise a clone characterized by a deletion within the proximal long arm of a chromosome 13 as the sole karyotypic abnormality.  Three (15%) metaphases had loss of the Y chromosome as the sole abnormality.    CT scan on 11/29/17 shows mildly enlarged left axillary lymph node and periaortic lymph nodes in the retroperitoneum of the abdomen.  Spleen is also enlarged.    On his staging CT scan for CLL, he was incidentally found to have a large infrarenal abdominal aortic aneurysm, measuring 7.6 cm.  He was seen by Dr. Thomas, and he underwent EVAR on 12/4/17.     He started ibrutinib on 5/4/20.  It was held 5/28/20-6/4/20 for tooth extraction.      INTERIM HISTORY: Austin says that he is doing well.  He notes dizziness at times but doesn't think it's due to the ibrutinib.          REVIEW OF SYSTEMS:   14 point ROS was reviewed and is negative other than as noted above in HPI.       HOME MEDICATIONS:  Current Outpatient Medications   Medication Sig Dispense Refill     aspirin (ASA) 81 MG tablet Take 1 tablet (81 mg) by mouth every evening (Patient taking differently: Take 325 mg by mouth daily ) 90 tablet 3     atorvastatin (LIPITOR) 20 MG tablet TAKE 1 TABLET BY MOUTH EVERYDAY AT BEDTIME 90 tablet 0     clopidogrel (PLAVIX) 75 MG tablet TAKE 1 TABLET BY MOUTH EVERY DAY 90 tablet 1     CONTOUR NEXT TEST test strip USE TO TEST BLOOD SUGAR 1-2 TIMES DAILY OR AS DIRECTED (ADJUSTING ORAL MEDICATIONS). 100 strip 1     fluocinonide (LIDEX) 0.05 % external cream Apply topically 2 times daily as needed Itchy bug bites. Use for up to 2 weeks at a time.       ibrutinib (IMBRUVICA) 420 MG tablet Take 1 tablet (420 mg) by mouth daily 28 tablet 0     ibrutinib (IMBRUVICA) 420 MG tablet Take  1 tablet (420 mg) by mouth daily 28 tablet 0     losartan-hydrochlorothiazide (HYZAAR) 50-12.5 MG tablet TAKE 1 TABLET BY MOUTH EVERY DAY 90 tablet 3     Multiple Vitamins-Minerals (CENTRUM SILVER) per tablet Take 1 tablet by mouth daily           ALLERGIES:  Allergies   Allergen Reactions     Ace Inhibitors      cough         PAST MEDICAL HISTORY:  Past Medical History:   Diagnosis Date     AAA (abdominal aortic aneurysm)      BCC (basal cell carcinoma of skin) - face      CLL (chronic lymphocytic leukemia)      Diaphragmatic hernia      Diverticulitis of colon      Esophageal reflux      Essential hypertension      Hyperlipidemia      Irritable bowel syndrome      Macular degeneration (senile) of retina      Squamous Cell Carcinoma, Back 1988     Type 2 diabetes mellitus          PAST SURGICAL HISTORY:  Past Surgical History:   Procedure Laterality Date     APPENDECTOMY OPEN  1966     BONE MARROW BIOPSY, BONE SPECIMEN, NEEDLE/TROCAR N/A 11/21/2017    Procedure: BIOPSY BONE MARROW;  BONE MARROW BIOPSY;  Surgeon: Shady Garcia MD;  Location: [facility] GI     COLONOSCOPY  2002     ENDOVASCULAR REPAIR ANEURYSM ABDOMINAL AORTA N/A 12/4/2017    Procedure: ENDOVASCULAR REPAIR ANEURYSM ABDOMINAL AORTA;  ENDOVASCULAR REPAIR ANEURYSM ABDOMINAL AORTA;  Surgeon: Milton Cazares MD;  Location: [facility] OR     Fistulotomy with marsupialization for repair of fisture in ano  2007     IR ABDOMINAL AORTOGRAM  3/11/2019     IR VISCERAL EMBOLIZATION  5/13/2019     Multiple skin tags - resection  1998     Squamous cell skin cancer resection - back  1985-90         SOCIAL HISTORY:  Social History     Socioeconomic History     Marital status:      Spouse name: librado     Number of children: 0     Years of education: 17     Highest education level: Not on file   Occupational History     Occupation: retired   Social Needs     Financial resource strain: Not on file     Food insecurity     Worry: Not on file     Inability: Not on  file     Transportation needs     Medical: Not on file     Non-medical: Not on file   Tobacco Use     Smoking status: Former Smoker     Packs/day: 0.50     Years: 20.00     Pack years: 10.00     Quit date: 1975     Years since quittin.1     Smokeless tobacco: Former User   Substance and Sexual Activity     Alcohol use: Yes     Alcohol/week: 10.0 - 14.0 standard drinks     Types: 10 - 14 Standard drinks or equivalent per week     Comment: 2 drinks a day/wine     Drug use: No     Sexual activity: Never   Lifestyle     Physical activity     Days per week: Not on file     Minutes per session: Not on file     Stress: Not on file   Relationships     Social connections     Talks on phone: Not on file     Gets together: Not on file     Attends Jewish service: Not on file     Active member of club or organization: Not on file     Attends meetings of clubs or organizations: Not on file     Relationship status: Not on file     Intimate partner violence     Fear of current or ex partner: Not on file     Emotionally abused: Not on file     Physically abused: Not on file     Forced sexual activity: Not on file   Other Topics Concern     Parent/sibling w/ CABG, MI or angioplasty before 65F 55M? Not Asked   Social History Narrative     Not on file   He quit smoking in .  He drinks 1-3 glasses of wine a night with dinner.  He is retired, and used to work as a .  He lives with his wife in [place].  His cousin had lung cancer and  around age 69.  Otherwise, he denies family history of cancer.        FAMILY HISTORY:  Family History   Problem Relation Age of Onset     Cerebrovascular Disease Father         x 2     Hypertension Mother      C.A.D. Mother      Cancer Mother         skin     Eye Disorder Mother         glaucoma     Prostate Cancer No family hx of      Cancer - colorectal No family hx of      Glaucoma No family hx of      Macular Degeneration No family hx of          PHYSICAL EXAM:  ECOG:  1  GENERAL/CONSTITUTIONAL: No acute distress. Healthy, alert.  EYES: No scleral icterus.  No redness or discharge.    RESPIRATORY: No audible wheeze, cough, or visible cyanosis.  No visible retractions or increased work of breathing.  Able to speak fully in complete sentences.  NEUROLOGIC: Alert, oriented, answers questions appropriately. No tremor. Mentation intact and speech normal  INTEGUMENTARY: No jaundice.    PSYCHIATRIC:  Mentation appears normal, affect normal/bright, judgement and insight intact, normal speech and appearance well-groomed.    The rest of a comprehensive physical exam is deferred due to public [facility] emergency video visit restrictions.          LABS:  CBC RESULTS:   Recent Labs   Lab Test 02/01/21  0934   WBC 20.1*   RBC 2.98*   HGB 10.5*   HCT 32.8*   *   MCH 35.2*   MCHC 32.0   RDW 14.6   *     Recent Labs   Lab Test 02/01/21  0934 01/05/21  0934    138   POTASSIUM 4.4 4.2   CHLORIDE 104 104   CO2 28 31   ANIONGAP 5 3   * 192*   BUN 29 31*   CR 1.56* 1.61*   MAGDALENO 9.5 9.3     Lab Results   Component Value Date    AST 12 02/01/2021     Lab Results   Component Value Date    ALT 14 02/01/2021     Lab Results   Component Value Date    BILICONJ 0.0 04/29/2005      Lab Results   Component Value Date    BILITOTAL 0.9 02/01/2021     Lab Results   Component Value Date    ALBUMIN 3.8 02/01/2021     Lab Results   Component Value Date    PROTTOTAL 6.7 02/01/2021      Lab Results   Component Value Date    ALKPHOS 53 02/01/2021             ASSESSMENT/PLAN:  Austin Garza is a 80 year old male with:    1) CLL/SLL: BLACKWOOD stage III, with lymphocytosis, lymphadenopathy, splenomegaly, and anemia.  He has mild thrombocytopenia as well, but is above 100K.  He presented with leukocytosis with WBC of 61.7K, hemoglobin of 10.4, and platelet count of 115K on diagnosis.  Bone marrow biopsy and flow cytometry confirmed CLL/SLL.  CT scan shows mildly enlarged left axillary lymph node  and periaortic lymph nodes in the retroperitoneum of the abdomen, with enlarged spleen.      Due to worsening lymphocyte count, anemia, thrombocytopenia, as well as fatigue and night sweats, he started ibrutinib on 5/4/20.      His WBC remains elevated, but has trended down nicely to 20.1.  Hemoglobin is improving.  Platelet count is stable.      -continue ibrutinib.  We previously discussed potential side effects, including bleeding risk.  He is not on warfarin and has no history of arrhythmias.  He is on aspirin and Plavix though.  I discussed with pharmacy.  There is no contraindication, but we will monitor closely for bleeding.    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 He completed radiation at [facility] with planned 60 Gy x 30 fractions. He has completed radiation and noted that he did not need any more follow-up for this.    3) Abdominal aortic aneurysm: measures up to 7.6 cm on CT scan, incidentally found.  He underwent EVAR on 12/4/17.  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  He is on Plavix now.  On 5/13/19, he underwent and percutaneous aneurysm sac puncture and endo leak embolization by IR on 5/13/19.  -follow-up with vascular surgery and IR    4) Diabetes, hypertension:  -following with PCP      Breana Perry MD  Hematology/Oncology  [facility]    Total time spent on day of visit, including review of tests, obtaining/reviewing separately obtained history, ordering medications/tests/procedures, communicating with PCP/consultants, and documenting in electronic medical record: 20 minutes        Again, thank you for allowing me to participate in the care of your patient.        Sincerely,        Breana Perry MD

## 2021-02-02 NOTE — LETTER
2/2/2021         RE: Austin Garza  [address] [place] [address]        Dear Colleague,    Thank you for referring your patient, Austin Garza, to the [facility]. Please see a copy of my visit note below.    Austin is a 80 year old who is being evaluated via a billable video visit.      How would you like to obtain your AVS? MyChart  If the video visit is dropped, the invitation should be resent by: Text to cell phone: Proficiency TEXT INVITE [phone]   Will anyone else be joining your video visit? No    Geetha Benítez CMA      Video Start Time: 3:39 PM    Video-Visit Details    Type of service:  Video Visit    Video End Time:3:44 PM    Originating Location (pt. Location): Home    Distant Location (provider location):  [facility]     Platform used for Video Visit: [facility]             [facility]    Hematology/Oncology Established Patient Note      Today's Date: 2/2/21    Reason for Follow-up: CLL      HISTORY OF PRESENT ILLNESS: Austin Garza is a 80 year old male with PMHx of DMII, HTN who presented with leukocytosis.  In November 2017, he was found to have elevated WBC of 61.7K, hemoglobin of 10.4, and platelet of 115K.  There were no other CBC results available between 2010 and 2017.  Prior to 2010, he had normal CBC, with normal to mild anemia, and mild thrombocytopenia.   He was asymptomatic.    He underwent bone marrow biopsy on 11/21/17, which was consistent with chronic lymphocytic leukemia/small lymphocytic lymphoma, with 13% ringed sideroblasts identified.  The presence of increased numbers of ringed sideroblasts raises the possibility of an associated myelodysplastic syndrome.  Dysplastic changes are not identified though.  Many cases exhibiting ringed sideroblasts are metabolic origin, without significant risk of progression to acute leukemia, and these typically do not show dysplastic  morphology as is the case with this specimen.  15% ringed sideroblasts are required for a diagnosis for RARS, which this specimen does not meet.  Flow cytometry is also consistent with CLL/SLL.  Cytogenetics show two (10%) of the metaphase cells comprise a clone characterized by a deletion within the proximal long arm of a chromosome 13 as the sole karyotypic abnormality.  Three (15%) metaphases had loss of the Y chromosome as the sole abnormality.    CT scan on 11/29/17 shows mildly enlarged left axillary lymph node and periaortic lymph nodes in the retroperitoneum of the abdomen.  Spleen is also enlarged.    On his staging CT scan for CLL, he was incidentally found to have a large infrarenal abdominal aortic aneurysm, measuring 7.6 cm.  He was seen by Dr. Thomas, and he underwent EVAR on 12/4/17.     He started ibrutinib on 5/4/20.  It was held 5/28/20-6/4/20 for tooth extraction.      INTERIM HISTORY: Austin says that he is doing well.  He notes dizziness at times but doesn't think it's due to the ibrutinib.          REVIEW OF SYSTEMS:   14 point ROS was reviewed and is negative other than as noted above in HPI.       HOME MEDICATIONS:  Current Outpatient Medications   Medication Sig Dispense Refill     aspirin (ASA) 81 MG tablet Take 1 tablet (81 mg) by mouth every evening (Patient taking differently: Take 325 mg by mouth daily ) 90 tablet 3     atorvastatin (LIPITOR) 20 MG tablet TAKE 1 TABLET BY MOUTH EVERYDAY AT BEDTIME 90 tablet 0     clopidogrel (PLAVIX) 75 MG tablet TAKE 1 TABLET BY MOUTH EVERY DAY 90 tablet 1     CONTOUR NEXT TEST test strip USE TO TEST BLOOD SUGAR 1-2 TIMES DAILY OR AS DIRECTED (ADJUSTING ORAL MEDICATIONS). 100 strip 1     fluocinonide (LIDEX) 0.05 % external cream Apply topically 2 times daily as needed Itchy bug bites. Use for up to 2 weeks at a time.       ibrutinib (IMBRUVICA) 420 MG tablet Take 1 tablet (420 mg) by mouth daily 28 tablet 0     ibrutinib (IMBRUVICA) 420 MG tablet Take  1 tablet (420 mg) by mouth daily 28 tablet 0     losartan-hydrochlorothiazide (HYZAAR) 50-12.5 MG tablet TAKE 1 TABLET BY MOUTH EVERY DAY 90 tablet 3     Multiple Vitamins-Minerals (CENTRUM SILVER) per tablet Take 1 tablet by mouth daily           ALLERGIES:  Allergies   Allergen Reactions     Ace Inhibitors      cough         PAST MEDICAL HISTORY:  Past Medical History:   Diagnosis Date     AAA (abdominal aortic aneurysm)      BCC (basal cell carcinoma of skin) - face      CLL (chronic lymphocytic leukemia)      Diaphragmatic hernia      Diverticulitis of colon      Esophageal reflux      Essential hypertension      Hyperlipidemia      Irritable bowel syndrome      Macular degeneration (senile) of retina      Squamous Cell Carcinoma, Back 1988     Type 2 diabetes mellitus          PAST SURGICAL HISTORY:  Past Surgical History:   Procedure Laterality Date     APPENDECTOMY OPEN  1966     BONE MARROW BIOPSY, BONE SPECIMEN, NEEDLE/TROCAR N/A 11/21/2017    Procedure: BIOPSY BONE MARROW;  BONE MARROW BIOPSY;  Surgeon: Shady Garcia MD;  Location: [facility]     COLONOSCOPY  2002     ENDOVASCULAR REPAIR ANEURYSM ABDOMINAL AORTA N/A 12/4/2017    Procedure: ENDOVASCULAR REPAIR ANEURYSM ABDOMINAL AORTA;  ENDOVASCULAR REPAIR ANEURYSM ABDOMINAL AORTA;  Surgeon: Milton Cazares MD;  Location: [facility] OR     Fistulotomy with marsupialization for repair of fisture in ano  2007     IR ABDOMINAL AORTOGRAM  3/11/2019     IR VISCERAL EMBOLIZATION  5/13/2019     Multiple skin tags - resection  1998     Squamous cell skin cancer resection - back  1985-90         SOCIAL HISTORY:  Social History     Socioeconomic History     Marital status:      Spouse name: librado     Number of children: 0     Years of education: 17     Highest education level: Not on file   Occupational History     Occupation: retired   Social Needs     Financial resource strain: Not on file     Food insecurity     Worry: Not on file     Inability: Not on  file     Transportation needs     Medical: Not on file     Non-medical: Not on file   Tobacco Use     Smoking status: Former Smoker     Packs/day: 0.50     Years: 20.00     Pack years: 10.00     Quit date: 1975     Years since quittin.1     Smokeless tobacco: Former User   Substance and Sexual Activity     Alcohol use: Yes     Alcohol/week: 10.0 - 14.0 standard drinks     Types: 10 - 14 Standard drinks or equivalent per week     Comment: 2 drinks a day/wine     Drug use: No     Sexual activity: Never   Lifestyle     Physical activity     Days per week: Not on file     Minutes per session: Not on file     Stress: Not on file   Relationships     Social connections     Talks on phone: Not on file     Gets together: Not on file     Attends Anglican service: Not on file     Active member of club or organization: Not on file     Attends meetings of clubs or organizations: Not on file     Relationship status: Not on file     Intimate partner violence     Fear of current or ex partner: Not on file     Emotionally abused: Not on file     Physically abused: Not on file     Forced sexual activity: Not on file   Other Topics Concern     Parent/sibling w/ CABG, MI or angioplasty before 65F 55M? Not Asked   Social History Narrative     Not on file   He quit smoking in .  He drinks 1-3 glasses of wine a night with dinner.  He is retired, and used to work as a .  He lives with his wife in [place].  His cousin had lung cancer and  around age 69.  Otherwise, he denies family history of cancer.        FAMILY HISTORY:  Family History   Problem Relation Age of Onset     Cerebrovascular Disease Father         x 2     Hypertension Mother      C.A.D. Mother      Cancer Mother         skin     Eye Disorder Mother         glaucoma     Prostate Cancer No family hx of      Cancer - colorectal No family hx of      Glaucoma No family hx of      Macular Degeneration No family hx of          PHYSICAL EXAM:  ECOG:  1  GENERAL/CONSTITUTIONAL: No acute distress. Healthy, alert.  EYES: No scleral icterus.  No redness or discharge.    RESPIRATORY: No audible wheeze, cough, or visible cyanosis.  No visible retractions or increased work of breathing.  Able to speak fully in complete sentences.  NEUROLOGIC: Alert, oriented, answers questions appropriately. No tremor. Mentation intact and speech normal  INTEGUMENTARY: No jaundice.    PSYCHIATRIC:  Mentation appears normal, affect normal/bright, judgement and insight intact, normal speech and appearance well-groomed.    The rest of a comprehensive physical exam is deferred due to public [facility] emergency video visit restrictions.          LABS:  CBC RESULTS:   Recent Labs   Lab Test 02/01/21  0934   WBC 20.1*   RBC 2.98*   HGB 10.5*   HCT 32.8*   *   MCH 35.2*   MCHC 32.0   RDW 14.6   *     Recent Labs   Lab Test 02/01/21  0934 01/05/21  0934    138   POTASSIUM 4.4 4.2   CHLORIDE 104 104   CO2 28 31   ANIONGAP 5 3   * 192*   BUN 29 31*   CR 1.56* 1.61*   MAGDALENO 9.5 9.3     Lab Results   Component Value Date    AST 12 02/01/2021     Lab Results   Component Value Date    ALT 14 02/01/2021     Lab Results   Component Value Date    BILICONJ 0.0 04/29/2005      Lab Results   Component Value Date    BILITOTAL 0.9 02/01/2021     Lab Results   Component Value Date    ALBUMIN 3.8 02/01/2021     Lab Results   Component Value Date    PROTTOTAL 6.7 02/01/2021      Lab Results   Component Value Date    ALKPHOS 53 02/01/2021             ASSESSMENT/PLAN:  Austin Garza is a 80 year old male with:    1) CLL/SLL: BLACKWOOD stage III, with lymphocytosis, lymphadenopathy, splenomegaly, and anemia.  He has mild thrombocytopenia as well, but is above 100K.  He presented with leukocytosis with WBC of 61.7K, hemoglobin of 10.4, and platelet count of 115K on diagnosis.  Bone marrow biopsy and flow cytometry confirmed CLL/SLL.  CT scan shows mildly enlarged left axillary lymph node  and periaortic lymph nodes in the retroperitoneum of the abdomen, with enlarged spleen.      Due to worsening lymphocyte count, anemia, thrombocytopenia, as well as fatigue and night sweats, he started ibrutinib on 5/4/20.      His WBC remains elevated, but has trended down nicely to 20.1.  Hemoglobin is improving.  Platelet count is stable.      -continue ibrutinib.  We previously discussed potential side effects, including bleeding risk.  He is not on warfarin and has no history of arrhythmias.  He is on aspirin and Plavix though.  I discussed with pharmacy.  There is no contraindication, but we will monitor closely for bleeding.    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 He completed radiation at [facility] with planned 60 Gy x 30 fractions. He has completed radiation and noted that he did not need any more follow-up for this.    3) Abdominal aortic aneurysm: measures up to 7.6 cm on CT scan, incidentally found.  He underwent EVAR on 12/4/17.  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  He is on Plavix now.  On 5/13/19, he underwent and percutaneous aneurysm sac puncture and endo leak embolization by IR on 5/13/19.  -follow-up with vascular surgery and IR    4) Diabetes, hypertension:  -following with PCP      Breana Perry MD  Hematology/Oncology  [facility]    Total time spent on day of visit, including review of tests, obtaining/reviewing separately obtained history, ordering medications/tests/procedures, communicating with PCP/consultants, and documenting in electronic medical record: 20 minutes        Again, thank you for allowing me to participate in the care of your patient.        Sincerely,        Breana Perry MD

## 2021-02-02 NOTE — PROGRESS NOTES
Austin is a 80 year old who is being evaluated via a billable video visit.      How would you like to obtain your AVS? MyChart  If the video visit is dropped, the invitation should be resent by: Text to cell phone: RACHEL TEXT INVITE [phone]   Will anyone else be joining your video visit? No    Geetha Benítez CMA      Video Start Time: 3:39 PM    Video-Visit Details    Type of service:  Video Visit    Video End Time:3:44 PM    Originating Location (pt. Location): Home    Distant Location (provider location):  [facility]     Platform used for Video Visit: [employer]             [facility]    Hematology/Oncology Established Patient Note      Today's Date: 2/2/21    Reason for Follow-up: CLL      HISTORY OF PRESENT ILLNESS: Austin Garza is a 80 year old male with PMHx of DMII, HTN who presented with leukocytosis.  In November 2017, he was found to have elevated WBC of 61.7K, hemoglobin of 10.4, and platelet of 115K.  There were no other CBC results available between 2010 and 2017.  Prior to 2010, he had normal CBC, with normal to mild anemia, and mild thrombocytopenia.   He was asymptomatic.    He underwent bone marrow biopsy on 11/21/17, which was consistent with chronic lymphocytic leukemia/small lymphocytic lymphoma, with 13% ringed sideroblasts identified.  The presence of increased numbers of ringed sideroblasts raises the possibility of an associated myelodysplastic syndrome.  Dysplastic changes are not identified though.  Many cases exhibiting ringed sideroblasts are metabolic origin, without significant risk of progression to acute leukemia, and these typically do not show dysplastic morphology as is the case with this specimen.  15% ringed sideroblasts are required for a diagnosis for RARS, which this specimen does not meet.  Flow cytometry is also consistent with CLL/SLL.  Cytogenetics show two (10%) of the metaphase cells comprise a clone  characterized by a deletion within the proximal long arm of a chromosome 13 as the sole karyotypic abnormality.  Three (15%) metaphases had loss of the Y chromosome as the sole abnormality.    CT scan on 11/29/17 shows mildly enlarged left axillary lymph node and periaortic lymph nodes in the retroperitoneum of the abdomen.  Spleen is also enlarged.    On his staging CT scan for CLL, he was incidentally found to have a large infrarenal abdominal aortic aneurysm, measuring 7.6 cm.  He was seen by Dr. Thomas, and he underwent EVAR on 12/4/17.     He started ibrutinib on 5/4/20.  It was held 5/28/20-6/4/20 for tooth extraction.      INTERIM HISTORY: Austin says that he is doing well.  He notes dizziness at times but doesn't think it's due to the ibrutinib.          REVIEW OF SYSTEMS:   14 point ROS was reviewed and is negative other than as noted above in HPI.       HOME MEDICATIONS:  Current Outpatient Medications   Medication Sig Dispense Refill     aspirin (ASA) 81 MG tablet Take 1 tablet (81 mg) by mouth every evening (Patient taking differently: Take 325 mg by mouth daily ) 90 tablet 3     atorvastatin (LIPITOR) 20 MG tablet TAKE 1 TABLET BY MOUTH EVERYDAY AT BEDTIME 90 tablet 0     clopidogrel (PLAVIX) 75 MG tablet TAKE 1 TABLET BY MOUTH EVERY DAY 90 tablet 1     CONTOUR NEXT TEST test strip USE TO TEST BLOOD SUGAR 1-2 TIMES DAILY OR AS DIRECTED (ADJUSTING ORAL MEDICATIONS). 100 strip 1     fluocinonide (LIDEX) 0.05 % external cream Apply topically 2 times daily as needed Itchy bug bites. Use for up to 2 weeks at a time.       ibrutinib (IMBRUVICA) 420 MG tablet Take 1 tablet (420 mg) by mouth daily 28 tablet 0     ibrutinib (IMBRUVICA) 420 MG tablet Take 1 tablet (420 mg) by mouth daily 28 tablet 0     losartan-hydrochlorothiazide (HYZAAR) 50-12.5 MG tablet TAKE 1 TABLET BY MOUTH EVERY DAY 90 tablet 3     Multiple Vitamins-Minerals (CENTRUM SILVER) per tablet Take 1 tablet by mouth daily            ALLERGIES:  Allergies   Allergen Reactions     Ace Inhibitors      cough         PAST MEDICAL HISTORY:  Past Medical History:   Diagnosis Date     AAA (abdominal aortic aneurysm)      BCC (basal cell carcinoma of skin) - face      CLL (chronic lymphocytic leukemia)      Diaphragmatic hernia      Diverticulitis of colon      Esophageal reflux      Essential hypertension      Hyperlipidemia      Irritable bowel syndrome      Macular degeneration (senile) of retina      Squamous Cell Carcinoma, Back 1988     Type 2 diabetes mellitus          PAST SURGICAL HISTORY:  Past Surgical History:   Procedure Laterality Date     APPENDECTOMY OPEN  1966     BONE MARROW BIOPSY, BONE SPECIMEN, NEEDLE/TROCAR N/A 11/21/2017    Procedure: BIOPSY BONE MARROW;  BONE MARROW BIOPSY;  Surgeon: Shady Garcia MD;  Location:  GI     COLONOSCOPY  2002     ENDOVASCULAR REPAIR ANEURYSM ABDOMINAL AORTA N/A 12/4/2017    Procedure: ENDOVASCULAR REPAIR ANEURYSM ABDOMINAL AORTA;  ENDOVASCULAR REPAIR ANEURYSM ABDOMINAL AORTA;  Surgeon: Milton Cazares MD;  Location:  OR     Fistulotomy with marsupialization for repair of fisture in ano  2007     IR ABDOMINAL AORTOGRAM  3/11/2019     IR VISCERAL EMBOLIZATION  5/13/2019     Multiple skin tags - resection  1998     Squamous cell skin cancer resection - back  1985-90         SOCIAL HISTORY:  Social History     Socioeconomic History     Marital status:      Spouse name: librado     Number of children: 0     Years of education: 17     Highest education level: Not on file   Occupational History     Occupation: retired   Social Needs     Financial resource strain: Not on file     Food insecurity     Worry: Not on file     Inability: Not on file     Transportation needs     Medical: Not on file     Non-medical: Not on file   Tobacco Use     Smoking status: Former Smoker     Packs/day: 0.50     Years: 20.00     Pack years: 10.00     Quit date: 1/1/1975     Years since quitting:  46.1     Smokeless tobacco: Former User   Substance and Sexual Activity     Alcohol use: Yes     Alcohol/week: 10.0 - 14.0 standard drinks     Types: 10 - 14 Standard drinks or equivalent per week     Comment: 2 drinks a day/wine     Drug use: No     Sexual activity: Never   Lifestyle     Physical activity     Days per week: Not on file     Minutes per session: Not on file     Stress: Not on file   Relationships     Social connections     Talks on phone: Not on file     Gets together: Not on file     Attends Scientologist service: Not on file     Active member of club or organization: Not on file     Attends meetings of clubs or organizations: Not on file     Relationship status: Not on file     Intimate partner violence     Fear of current or ex partner: Not on file     Emotionally abused: Not on file     Physically abused: Not on file     Forced sexual activity: Not on file   Other Topics Concern     Parent/sibling w/ CABG, MI or angioplasty before 65F 55M? Not Asked   Social History Narrative     Not on file   He quit smoking in .  He drinks 1-3 glasses of wine a night with dinner.  He is retired, and used to work as a .  He lives with his wife in [place].  His cousin had lung cancer and  around age 69.  Otherwise, he denies family history of cancer.        FAMILY HISTORY:  Family History   Problem Relation Age of Onset     Cerebrovascular Disease Father         x 2     Hypertension Mother      C.A.D. Mother      Cancer Mother         skin     Eye Disorder Mother         glaucoma     Prostate Cancer No family hx of      Cancer - colorectal No family hx of      Glaucoma No family hx of      Macular Degeneration No family hx of          PHYSICAL EXAM:  ECO  GENERAL/CONSTITUTIONAL: No acute distress. Healthy, alert.  EYES: No scleral icterus.  No redness or discharge.    RESPIRATORY: No audible wheeze, cough, or visible cyanosis.  No visible retractions or increased work of breathing.  Able to speak  fully in complete sentences.  NEUROLOGIC: Alert, oriented, answers questions appropriately. No tremor. Mentation intact and speech normal  INTEGUMENTARY: No jaundice.    PSYCHIATRIC:  Mentation appears normal, affect normal/bright, judgement and insight intact, normal speech and appearance well-groomed.    The rest of a comprehensive physical exam is deferred due to public [facility] emergency video visit restrictions.          LABS:  CBC RESULTS:   Recent Labs   Lab Test 02/01/21  0934   WBC 20.1*   RBC 2.98*   HGB 10.5*   HCT 32.8*   *   MCH 35.2*   MCHC 32.0   RDW 14.6   *     Recent Labs   Lab Test 02/01/21  0934 01/05/21  0934    138   POTASSIUM 4.4 4.2   CHLORIDE 104 104   CO2 28 31   ANIONGAP 5 3   * 192*   BUN 29 31*   CR 1.56* 1.61*   MAGDALENO 9.5 9.3     Lab Results   Component Value Date    AST 12 02/01/2021     Lab Results   Component Value Date    ALT 14 02/01/2021     Lab Results   Component Value Date    BILICONJ 0.0 04/29/2005      Lab Results   Component Value Date    BILITOTAL 0.9 02/01/2021     Lab Results   Component Value Date    ALBUMIN 3.8 02/01/2021     Lab Results   Component Value Date    PROTTOTAL 6.7 02/01/2021      Lab Results   Component Value Date    ALKPHOS 53 02/01/2021             ASSESSMENT/PLAN:  Austin Garza is a 80 year old male with:    1) CLL/SLL: BLACKWOOD stage III, with lymphocytosis, lymphadenopathy, splenomegaly, and anemia.  He has mild thrombocytopenia as well, but is above 100K.  He presented with leukocytosis with WBC of 61.7K, hemoglobin of 10.4, and platelet count of 115K on diagnosis.  Bone marrow biopsy and flow cytometry confirmed CLL/SLL.  CT scan shows mildly enlarged left axillary lymph node and periaortic lymph nodes in the retroperitoneum of the abdomen, with enlarged spleen.      Due to worsening lymphocyte count, anemia, thrombocytopenia, as well as fatigue and night sweats, he started ibrutinib on 5/4/20.      His WBC remains  elevated, but has trended down nicely to 20.1.  Hemoglobin is improving.  Platelet count is stable.      -continue ibrutinib.  We previously discussed potential side effects, including bleeding risk.  He is not on warfarin and has no history of arrhythmias.  He is on aspirin and Plavix though.  I discussed with pharmacy.  There is no contraindication, but we will monitor closely for bleeding.    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 He completed radiation at [facility] with planned 60 Gy x 30 fractions. He has completed radiation and noted that he did not need any more follow-up for this.    3) Abdominal aortic aneurysm: measures up to 7.6 cm on CT scan, incidentally found.  He underwent EVAR on 12/4/17.  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  He is on Plavix now.  On 5/13/19, he underwent and percutaneous aneurysm sac puncture and endo leak embolization by IR on 5/13/19.  -follow-up with vascular surgery and IR    4) Diabetes, hypertension:  -following with PCP      Breana Perry MD  Hematology/Oncology  [facility]    Total time spent on day of visit, including review of tests, obtaining/reviewing separately obtained history, ordering medications/tests/procedures, communicating with PCP/consultants, and documenting in electronic medical record: 20 minutes

## 2021-02-04 ENCOUNTER — OFFICE VISIT (OUTPATIENT)
Dept: OPTOMETRY | Facility: CLINIC | Age: 81
End: 2021-02-04
Payer: COMMERCIAL

## 2021-02-04 DIAGNOSIS — H26.9 BILATERAL INCIPIENT CATARACTS: ICD-10-CM

## 2021-02-04 DIAGNOSIS — E11.9 DIABETES MELLITUS WITHOUT OPHTHALMIC MANIFESTATIONS (H): ICD-10-CM

## 2021-02-04 DIAGNOSIS — H52.03 HYPEROPIA OF BOTH EYES WITH ASTIGMATISM: Primary | ICD-10-CM

## 2021-02-04 DIAGNOSIS — H52.4 PRESBYOPIA: ICD-10-CM

## 2021-02-04 DIAGNOSIS — H52.203 HYPEROPIA OF BOTH EYES WITH ASTIGMATISM: Primary | ICD-10-CM

## 2021-02-04 PROCEDURE — 92015 DETERMINE REFRACTIVE STATE: CPT | Performed by: OPTOMETRIST

## 2021-02-04 PROCEDURE — 92004 COMPRE OPH EXAM NEW PT 1/>: CPT | Performed by: OPTOMETRIST

## 2021-02-04 ASSESSMENT — REFRACTION_WEARINGRX
OD_AXIS: 102
OD_ADD: +2.50
OD_CYLINDER: +0.75
SPECS_TYPE: PAL
OD_SPHERE: -3.00
OS_SPHERE: -2.50
OS_ADD: +2.50
OS_CYLINDER: +1.50
OS_AXIS: 111

## 2021-02-04 ASSESSMENT — REFRACTION_MANIFEST
OD_CYLINDER: +1.75
METHOD_AUTOREFRACTION: 1
OD_AXIS: 097
OD_SPHERE: -3.25
OS_CYLINDER: +2.75
OS_ADD: +2.50
OS_AXIS: 114
OS_CYLINDER: +1.50
OS_SPHERE: -2.50
OD_CYLINDER: +1.50
OD_SPHERE: -3.25
OS_SPHERE: -2.50
OD_AXIS: 101
OD_ADD: +2.50
OS_AXIS: 107

## 2021-02-04 ASSESSMENT — VISUAL ACUITY
OD_CC+: +2
OD_CC: 20/50
OS_SC: 20/150
OS_CC+: -1
METHOD: SNELLEN - LINEAR
OS_CC: 20/25
OS_CC: 20/30-1
OD_SC: 20/100
CORRECTION_TYPE: GLASSES
OD_CC: 20/30-2

## 2021-02-04 ASSESSMENT — CUP TO DISC RATIO
OS_RATIO: 0.3
OD_RATIO: 0.5

## 2021-02-04 ASSESSMENT — CONF VISUAL FIELD
METHOD: COUNTING FINGERS
OS_NORMAL: 1
OD_NORMAL: 1

## 2021-02-04 ASSESSMENT — TONOMETRY
OS_IOP_MMHG: 16
IOP_METHOD: APPLANATION
OD_IOP_MMHG: 16

## 2021-02-04 ASSESSMENT — EXTERNAL EXAM - RIGHT EYE: OD_EXAM: NORMAL

## 2021-02-04 ASSESSMENT — SLIT LAMP EXAM - LIDS
COMMENTS: BLEPHARITIS, DERMATOCHALASIS
COMMENTS: BLEPHARITIS, DERMATOCHALASIS

## 2021-02-04 ASSESSMENT — EXTERNAL EXAM - LEFT EYE: OS_EXAM: NORMAL

## 2021-02-04 NOTE — LETTER
"    2/4/2021         RE: Austin Garza  [address] [place] [address]        Dear Colleague,    Thank you for referring your patient, Austin Garza, to the [facility]. Please see a copy of my visit note below.    Chief Complaint   Patient presents with     Diabetic Eye Exam     Blur at near and distance. He has been taking his glasses off to read.  \"my astigmatism is not corrected, or I'm getting a cataract\"    He has been having dizzy spells, spoke to oncologist and she said it's not due to his medication. He attributes this to Plavix. He states he bring this up at the eye doctor because everything looks tilted and 'off' when this happens. Usually happens in the morning.    Pt states that he is bad at checking his sugar. Had an episode of hypoglycemia over the summer.      Lab Results   Component Value Date    A1C 6.7 01/15/2021    A1C 5.6 08/23/2020    A1C 5.5 07/14/2020    A1C 6.3 12/31/2019    A1C 6.3 12/17/2019       Last Eye Exam: 2017  Dilated Previously: Yes. Signs and symptoms of dilation were discussed. Patient consents to dilation today.    What are you currently using to see?  glasses    Distance Vision Acuity: Noticed gradual change in both eyes    Near Vision Acuity: Not satisfied     Eye Comfort: good  Do you use eye drops? : Eli Mitchell CPO     Medical, surgical and family histories reviewed and updated 2/4/2021.       OBJECTIVE: See Ophthalmology exam    ASSESSMENT:    ICD-10-CM    1. Hyperopia of both eyes with astigmatism  H52.03 REFRACTION    H52.203    2. Diabetes mellitus without ophthalmic manifestations (H)  E11.9    3. Presbyopia  H52.4    4. Bilateral incipient cataracts  H26.9     stable cd asymmetry / monitor  PLAN:    Austin CHEEMA Ángelaela aware  eye exam results will be sent to Sandi Eastman.    Cristina Allen OD       Again, thank you for allowing me to participate in the care of your patient.        Sincerely,        Cristina Allen, " OD

## 2021-02-04 NOTE — PROGRESS NOTES
"Chief Complaint   Patient presents with     Diabetic Eye Exam     Blur at near and distance. He has been taking his glasses off to read.  \"my astigmatism is not corrected, or I'm getting a cataract\"    He has been having dizzy spells, spoke to oncologist and she said it's not due to his medication. He attributes this to Plavix. He states he bring this up at the eye doctor because everything looks tilted and 'off' when this happens. Usually happens in the morning.    Pt states that he is bad at checking his sugar. Had an episode of hypoglycemia over the summer.      Lab Results   Component Value Date    A1C 6.7 01/15/2021    A1C 5.6 08/23/2020    A1C 5.5 07/14/2020    A1C 6.3 12/31/2019    A1C 6.3 12/17/2019       Last Eye Exam: 2017  Dilated Previously: Yes. Signs and symptoms of dilation were discussed. Patient consents to dilation today.    What are you currently using to see?  glasses    Distance Vision Acuity: Noticed gradual change in both eyes    Near Vision Acuity: Not satisfied     Eye Comfort: good  Do you use eye drops? : No      Marj Mitchell CPO     Medical, surgical and family histories reviewed and updated 2/4/2021.       OBJECTIVE: See Ophthalmology exam    ASSESSMENT:    ICD-10-CM    1. Hyperopia of both eyes with astigmatism  H52.03 REFRACTION    H52.203    2. Diabetes mellitus without ophthalmic manifestations (H)  E11.9    3. Presbyopia  H52.4    4. Bilateral incipient cataracts  H26.9     stable cd asymmetry / monitor  PLAN:    Ausitn Garza aware  eye exam results will be sent to Sandi Eastman.    Cristina Allen OD   "

## 2021-02-04 NOTE — PATIENT INSTRUCTIONS
Patient Education   Diabetes weakens the blood vessels all over the body, including the eyes. Damage to the blood vessels in the eyes can cause swelling or bleeding into part of the eye (called the retina). This is called diabetic retinopathy (ADITI-tin--pu-thee). If not treated, this disease can cause vision loss or blindness.   Symptoms may include blurred or distorted vision, but many people have no symptoms. It's important to see your eye doctor regularly to check for problems.   Early treatment and good control can help protect your vision. Here are the things you can do to help prevent vision loss:      1. Keep your blood sugar levels under tight control.      2. Bring high blood pressure under control.      3. No smoking.      4. Have yearly dilated eye exams.    Early start of cataracts good vision/ no changes

## 2021-02-05 NOTE — TELEPHONE ENCOUNTER
Patient has appointment on 02/18/2021, closing encounter.    Iliana Morelos MA 10:20 AM 2/5/2021

## 2021-02-08 DIAGNOSIS — C91.10 CLL (CHRONIC LYMPHOCYTIC LEUKEMIA) (H): Primary | ICD-10-CM

## 2021-02-18 ENCOUNTER — OFFICE VISIT (OUTPATIENT)
Dept: PEDIATRICS | Facility: CLINIC | Age: 81
End: 2021-02-18
Payer: COMMERCIAL

## 2021-02-18 VITALS
SYSTOLIC BLOOD PRESSURE: 169 MMHG | OXYGEN SATURATION: 98 % | WEIGHT: 215 LBS | HEART RATE: 86 BPM | DIASTOLIC BLOOD PRESSURE: 84 MMHG | HEIGHT: 69 IN | TEMPERATURE: 97.6 F | BODY MASS INDEX: 31.84 KG/M2

## 2021-02-18 DIAGNOSIS — E66.01 MORBIDLY OBESE (H): ICD-10-CM

## 2021-02-18 DIAGNOSIS — I71.40 ABDOMINAL AORTIC ANEURYSM (AAA) WITHOUT RUPTURE (H): ICD-10-CM

## 2021-02-18 DIAGNOSIS — E78.5 HYPERLIPIDEMIA LDL GOAL <100: ICD-10-CM

## 2021-02-18 DIAGNOSIS — N18.31 STAGE 3A CHRONIC KIDNEY DISEASE (H): ICD-10-CM

## 2021-02-18 DIAGNOSIS — I77.70 ARTERY DISSECTION (H): ICD-10-CM

## 2021-02-18 DIAGNOSIS — E11.649 TYPE 2 DIABETES MELLITUS WITH HYPOGLYCEMIA WITHOUT COMA, WITHOUT LONG-TERM CURRENT USE OF INSULIN (H): Primary | ICD-10-CM

## 2021-02-18 PROCEDURE — 99215 OFFICE O/P EST HI 40 MIN: CPT | Performed by: INTERNAL MEDICINE

## 2021-02-18 RX ORDER — METFORMIN HCL 500 MG
500 TABLET, EXTENDED RELEASE 24 HR ORAL
Qty: 90 TABLET | Refills: 1 | Status: SHIPPED | OUTPATIENT
Start: 2021-02-18 | End: 2021-06-22

## 2021-02-18 ASSESSMENT — MIFFLIN-ST. JEOR: SCORE: 1685.61

## 2021-02-18 NOTE — PATIENT INSTRUCTIONS
Your blood pressure was a bit high today, I recommend checking a few times a week at home.     We can stop your statin medication. We will re-check your lipids in 3 months when we re-check your A1c.     For your diabetes, we are re-starting metformin. One tablet with dinner at night. We will re-check the A1c in 3 months and re-evaluate the medication dose.     Vanessa Munson MD   Internal Medicine - Pediatrics

## 2021-02-18 NOTE — PROGRESS NOTES
"    Assessment & Plan     Type 2 diabetes mellitus with hypoglycemia without coma, without long-term current use of insulin (H)  A1c back into the diabetic range after recent weight gain. Had stopped oral hypoglycemics after hospitalization for hypoglycemia last August. Will start metformin, re-check in 3 months.   - metFORMIN (GLUCOPHAGE-XR) 500 MG 24 hr tablet; Take 1 tablet (500 mg) by mouth daily (with dinner)  - **A1C FUTURE anytime; Future    Hyperlipidemia LDL goal <100  Recent LDL 27, total cholesterol 109.  No history of cardiovascular disease. Has had AA aneurysm dissection and repair, but no atherosclerotic disease. Given age, low cholesterol, can stop statin now and re-check lipids in 3 months.   - Lipid panel reflex to direct LDL Fasting; Future    Overweight   Recent weight gain. Discussed diet changes for weight loss.     Abdominal aortic aneurysm (AAA) without rupture (H)  Per patient, due for monitoring with angiogram. Prefers to wait until after pandemic is better controlled.     Artery dissection (H)  As above, due for imaging.     Stage 3a chronic kidney disease  Stable.         42 minutes spent on the date of the encounter doing chart review, history and exam, documentation and further activities as noted above       BMI:   Estimated body mass index is 31.75 kg/m  as calculated from the following:    Height as of this encounter: 1.753 m (5' 9\").    Weight as of this encounter: 97.5 kg (215 lb).           Return in about 3 months (around 5/18/2021) for Follow up.    Vanessa Munson MD  [facility] Avila is a 80 year old who presents for the following health issues     HPI       Diabetes Follow-up    How often are you checking your blood sugar? Two times daily  Blood sugar testing frequency justification:  Uncontrolled diabetes and Patient modifying lifestyle changes (diet, exercise) with blood sugars  What time of day are you checking your blood sugars (select " "all that apply)?  waking up  135-130   Have you had any blood sugars above 200?  No  Have you had any blood sugars below 70?  Yes     What symptoms do you notice when your blood sugar is low?  dizziness    What concerns do you have today about your diabetes? None and Other: dizziness     Do you have any of these symptoms? (Select all that apply)  Burning in feet      Austin is here today to follow up on his diabetes. He stopped medication in August after hospitalization for hypoglycemia. Stopped checking his blood sugar for a while.  October, November his morning blood sugar was about 120s.  After breakfast it would be closer to 100. Dizziness started coming on just before [place].  Gained some weight over the holiday season.  A week before bautista, getting dressed, bending over he became dizzy/imbalanced.  Went away after he stood up.  [facility] quickly and he felt shaky/imbalanced again.  No other incidents.     Last eye exam: last week.       Hyperlipidemia: has been on lipitor 20 mg for years.  Has never had cardiovascular disease, did have AAA dissection that has been repaired.     The ASCVD Risk score (Thong DC Jr., et al., 2013) failed to calculate for the following reasons:    The valid total cholesterol range is 130 to 320 mg/dL            BP Readings from Last 2 Encounters:   02/18/21 (!) 169/84   09/15/20 131/77     Hemoglobin A1C (%)   Date Value   01/15/2021 6.7 (H)   08/23/2020 5.6     LDL Cholesterol Calculated (mg/dL)   Date Value   01/15/2021 27   03/11/2019 52                   Review of Systems   Constitutional, HEENT, cardiovascular, pulmonary, gi and gu systems are negative, except as otherwise noted.      Objective    BP (!) 169/84 (BP Location: Right arm, Cuff Size: Adult Large)   Pulse 86   Temp 97.6  F (36.4  C) (Tympanic)   Ht 1.753 m (5' 9\")   Wt 97.5 kg (215 lb)   SpO2 98%   BMI 31.75 kg/m    Body mass index is 31.75 " kg/m .  Physical Exam   GENERAL: healthy, alert and no distress  EYES: Eyes grossly normal to inspection, PERRL and conjunctivae and sclerae normal  HENT: ear canals and TM's normal, nose and mouth without ulcers or lesions  NECK: no adenopathy, no asymmetry, masses, or scars and thyroid normal to palpation  RESP: lungs clear to auscultation - no rales, rhonchi or wheezes  CV: regular rate and rhythm, normal S1 S2, no S3 or S4, no murmur, click or rub, no peripheral edema and peripheral pulses strong  MS: no gross musculoskeletal defects noted, no edema  SKIN: no suspicious lesions or rashes  NEURO: Normal strength and tone, mentation intact and speech normal  PSYCH: mentation appears normal, affect normal/bright  Diabetic foot exam: normal DP and PT pulses, no trophic changes or ulcerative lesions and normal sensory exam

## 2021-03-03 ENCOUNTER — HOSPITAL ENCOUNTER (OUTPATIENT)
Facility: CLINIC | Age: 81
Setting detail: SPECIMEN
Discharge: HOME | End: 2021-03-03
Attending: INTERNAL MEDICINE | Admitting: INTERNAL MEDICINE
Payer: COMMERCIAL

## 2021-03-03 DIAGNOSIS — C91.10 CLL (CHRONIC LYMPHOCYTIC LEUKEMIA) (H): Primary | ICD-10-CM

## 2021-03-03 DIAGNOSIS — C91.10 CLL (CHRONIC LYMPHOCYTIC LEUKEMIA) (H): ICD-10-CM

## 2021-03-03 LAB
ALBUMIN SERPL-MCNC: 3.7 G/DL (ref 3.4–5)
ALP SERPL-CCNC: 56 U/L (ref 40–150)
ALT SERPL W P-5'-P-CCNC: 15 U/L (ref 0–70)
ANION GAP SERPL CALCULATED.3IONS-SCNC: 5 MMOL/L (ref 3–14)
AST SERPL W P-5'-P-CCNC: 14 U/L (ref 0–45)
BASOPHILS # BLD AUTO: 0 10E9/L (ref 0–0.2)
BASOPHILS NFR BLD AUTO: 0 %
BILIRUB SERPL-MCNC: 0.7 MG/DL (ref 0.2–1.3)
BUN SERPL-MCNC: 28 MG/DL (ref 7–30)
CALCIUM SERPL-MCNC: 9.5 MG/DL (ref 8.5–10.1)
CHLORIDE SERPL-SCNC: 107 MMOL/L (ref 94–109)
CO2 SERPL-SCNC: 28 MMOL/L (ref 20–32)
CREAT SERPL-MCNC: 1.57 MG/DL (ref 0.66–1.25)
DIFFERENTIAL METHOD BLD: ABNORMAL
EOSINOPHIL # BLD AUTO: 0 10E9/L (ref 0–0.7)
EOSINOPHIL NFR BLD AUTO: 0 %
ERYTHROCYTE [DISTWIDTH] IN BLOOD BY AUTOMATED COUNT: 14 % (ref 10–15)
GFR SERPL CREATININE-BSD FRML MDRD: 41 ML/MIN/{1.73_M2}
GLUCOSE SERPL-MCNC: 176 MG/DL (ref 70–99)
HCT VFR BLD AUTO: 37.1 % (ref 40–53)
HGB BLD-MCNC: 11.8 G/DL (ref 13.3–17.7)
LDH SERPL L TO P-CCNC: 191 U/L (ref 85–227)
LYMPHOCYTES # BLD AUTO: 16.1 10E9/L (ref 0.8–5.3)
LYMPHOCYTES NFR BLD AUTO: 84 %
MACROCYTES BLD QL SMEAR: PRESENT
MCH RBC QN AUTO: 34.2 PG (ref 26.5–33)
MCHC RBC AUTO-ENTMCNC: 31.8 G/DL (ref 31.5–36.5)
MCV RBC AUTO: 108 FL (ref 78–100)
MONOCYTES # BLD AUTO: 0.2 10E9/L (ref 0–1.3)
MONOCYTES NFR BLD AUTO: 1 %
NEUTROPHILS # BLD AUTO: 2.9 10E9/L (ref 1.6–8.3)
NEUTROPHILS NFR BLD AUTO: 15 %
OVALOCYTES BLD QL SMEAR: SLIGHT
PLATELET # BLD AUTO: 114 10E9/L (ref 150–450)
PLATELET # BLD EST: ABNORMAL 10*3/UL
POTASSIUM SERPL-SCNC: 4.4 MMOL/L (ref 3.4–5.3)
PROT SERPL-MCNC: 6.5 G/DL (ref 6.8–8.8)
RBC # BLD AUTO: 3.45 10E12/L (ref 4.4–5.9)
RBC INCLUSIONS BLD: SLIGHT
SODIUM SERPL-SCNC: 140 MMOL/L (ref 133–144)
WBC # BLD AUTO: 19.2 10E9/L (ref 4–11)

## 2021-03-03 PROCEDURE — 85025 COMPLETE CBC W/AUTO DIFF WBC: CPT | Performed by: INTERNAL MEDICINE

## 2021-03-03 PROCEDURE — 80053 COMPREHEN METABOLIC PANEL: CPT | Performed by: INTERNAL MEDICINE

## 2021-03-03 PROCEDURE — 83615 LACTATE (LD) (LDH) ENZYME: CPT | Performed by: INTERNAL MEDICINE

## 2021-03-03 PROCEDURE — 36415 COLL VENOUS BLD VENIPUNCTURE: CPT

## 2021-03-03 NOTE — ORAL ONC MGMT
Oral Chemotherapy Monitoring Program  Lab Follow Up    Reviewed lab results from 3/3/21.    ORAL CHEMOTHERAPY 5/8/2020 5/21/2020 6/10/2020 11/3/2020 11/4/2020 12/8/2020 2/8/2021   Assessment Type - - - Refill Refill Monthly Follow up Refill   Diagnosis Code - - - Chronic Lymphocytic Leukemia (CLL) Chronic Lymphocytic Leukemia (CLL) Chronic Lymphocytic Leukemia (CLL) Chronic Lymphocytic Leukemia (CLL)   Providers - - - Dr. Orlando Perry   Clinic Name/Location - - - [facility]   Drug Name Imbruvica (Ibrutinib) Imbruvica (Ibrutinib) Imbruvica (Ibrutinib) Imbruvica (Ibrutinib) - Imbruvica (Ibrutinib) Imbruvica (Ibrutinib)   Dose - - - 420 mg - 420 mg 420 mg   Current Schedule Daily Daily Daily Daily - Daily Daily   Cycle Details Continuous Continuous Continuous Continuous - Continuous Continuous   Start Date of Last Cycle 5/4/2020 - - - - - -   Planned next cycle start date 6/1/2020 - - - - 12/14/2020 -   Doses missed in last 2 weeks 0 0 more - - 0 -   Adherence Assessment Adherent Adherent Non-adherent - - Adherent -   Reason for Non-adherence - - Drug on hold - - - -   Adherence Intervention Recommended - - None - - - -   Adverse Effects Diarrhea Diarrhea Fatigue;Other (see note for details) - - Other (See Note for Details) Neutropenia   Diarrhea Grade 1 Grade 1 - - - - -   Pharmacist Intervention(diarrhea) Yes Yes - - - - -   Fatigue - - Grade 1 - - - -   Pharmacist Intervention(fatigue) - - No - - - -   Neutropenia - - - - - - Grade 3   Pharmacist Intervention(neutropenia) - - - - - - No   Other (See Note for Details) - - bruising - - bruising, muscle cramp -   Pharmacist intervention(other) - - No - - No -   Any new drug interactions? No No No - - No -   Pharmacist Intervention? - - - - - - -   Intervention(s) - - - - - - -   Is the dose as ordered appropriate for the patient? Yes Yes - - - Yes -   Is the patient currently in pain? No No No - - No -   Has the  patient been assessed within the past 6 months for depression? Yes No Yes - - No -   Has the patient missed any days of school, work, or other routine activity? No No - - - No -   Since the last time we talked, have you been hospitalized or used the emergency room? - - - - - Yes -   What medical service did you use? - - - - - Emergency Room -   How many emergency room visits? - - - - - (No Data) -   How many emergency room visits were related to the cancer medication? - - - - - 0 -       Labs:  _  Result Component Current Result Ref Range   Sodium 140 (3/3/2021) 133 - 144 mmol/L     _  Result Component Current Result Ref Range   Potassium 4.4 (3/3/2021) 3.4 - 5.3 mmol/L     _  Result Component Current Result Ref Range   Calcium 9.5 (3/3/2021) 8.5 - 10.1 mg/dL     No results found for Mag within last 30 days.     No results found for Phos within last 30 days.     _  Result Component Current Result Ref Range   Albumin 3.7 (3/3/2021) 3.4 - 5.0 g/dL     _  Result Component Current Result Ref Range   Urea Nitrogen 28 (3/3/2021) 7 - 30 mg/dL     _  Result Component Current Result Ref Range   Creatinine 1.57 (H) (3/3/2021) 0.66 - 1.25 mg/dL     _  Result Component Current Result Ref Range   AST 14 (3/3/2021) 0 - 45 U/L     _  Result Component Current Result Ref Range   ALT 15 (3/3/2021) 0 - 70 U/L     _  Result Component Current Result Ref Range   Bilirubin Total 0.7 (3/3/2021) 0.2 - 1.3 mg/dL     _  Result Component Current Result Ref Range   WBC 19.2 (H) (3/3/2021) 4.0 - 11.0 10e9/L     _  Result Component Current Result Ref Range   Hemoglobin 11.8 (L) (3/3/2021) 13.3 - 17.7 g/dL     _  Result Component Current Result Ref Range   Platelet Count 114 (L) (3/3/2021) 150 - 450 10e9/L     _  Result Component Current Result Ref Range   Absolute Neutrophil 2.9 (3/3/2021) 1.6 - 8.3 10e9/L       Assessment & Plan:  No concerning abnormalities.    Questions answered to patient's satisfaction.    Follow-Up:  1 month    Noble  Rasheed, PharmD  Hematology/Oncology Clinical Pharmacist  [facility], [facility]  [phone]

## 2021-03-03 NOTE — PROGRESS NOTES
Medical Assistant Note:  Austin Garza presents today for blood draw.    Patient seen by provider today: No.   present during visit today: Not Applicable.    Concerns: No Concerns.    Procedure:  Labs drawn: .    Post Assessment:  Labs drawn without difficulty: Yes.    Discharge Plan:  Departure Mode: Ambulatory.    Face to Face Time: 10.    Ita Pinto, [facility]

## 2021-03-05 DIAGNOSIS — C91.10 CLL (CHRONIC LYMPHOCYTIC LEUKEMIA) (H): Primary | ICD-10-CM

## 2021-03-11 NOTE — TELEPHONE ENCOUNTER
Free Drug Application Approved  Effective Dates: 1/19/2021-12/31/2021  Patient notified?Yes, patient got a letter from [employer] so he was aware before we were  Additional Information: patient needs to sign and mail the attestation form, which he said he did around 2/12

## 2021-04-02 DIAGNOSIS — C91.10 CLL (CHRONIC LYMPHOCYTIC LEUKEMIA) (H): Primary | ICD-10-CM

## 2021-04-05 ENCOUNTER — HOSPITAL ENCOUNTER (OUTPATIENT)
Facility: CLINIC | Age: 81
Setting detail: SPECIMEN
Discharge: HOME | End: 2021-04-05
Attending: INTERNAL MEDICINE | Admitting: INTERNAL MEDICINE
Payer: COMMERCIAL

## 2021-04-05 DIAGNOSIS — C91.10 CLL (CHRONIC LYMPHOCYTIC LEUKEMIA) (H): ICD-10-CM

## 2021-04-05 DIAGNOSIS — E78.5 HYPERLIPIDEMIA LDL GOAL <100: ICD-10-CM

## 2021-04-05 DIAGNOSIS — E11.649 TYPE 2 DIABETES MELLITUS WITH HYPOGLYCEMIA WITHOUT COMA, WITHOUT LONG-TERM CURRENT USE OF INSULIN (H): ICD-10-CM

## 2021-04-05 LAB
ALBUMIN SERPL-MCNC: 3.8 G/DL (ref 3.4–5)
ALP SERPL-CCNC: 56 U/L (ref 40–150)
ALT SERPL W P-5'-P-CCNC: 16 U/L (ref 0–70)
ANION GAP SERPL CALCULATED.3IONS-SCNC: 4 MMOL/L (ref 3–14)
AST SERPL W P-5'-P-CCNC: 16 U/L (ref 0–45)
BASOPHILS # BLD AUTO: 0 10E9/L (ref 0–0.2)
BASOPHILS NFR BLD AUTO: 0 %
BILIRUB SERPL-MCNC: 0.9 MG/DL (ref 0.2–1.3)
BUN SERPL-MCNC: 24 MG/DL (ref 7–30)
CALCIUM SERPL-MCNC: 8.9 MG/DL (ref 8.5–10.1)
CHLORIDE SERPL-SCNC: 106 MMOL/L (ref 94–109)
CHOLEST SERPL-MCNC: 166 MG/DL
CO2 SERPL-SCNC: 30 MMOL/L (ref 20–32)
CREAT SERPL-MCNC: 1.48 MG/DL (ref 0.66–1.25)
DIFFERENTIAL METHOD BLD: ABNORMAL
EOSINOPHIL # BLD AUTO: 0.2 10E9/L (ref 0–0.7)
EOSINOPHIL NFR BLD AUTO: 1 %
ERYTHROCYTE [DISTWIDTH] IN BLOOD BY AUTOMATED COUNT: 13 % (ref 10–15)
GFR SERPL CREATININE-BSD FRML MDRD: 44 ML/MIN/{1.73_M2}
GLUCOSE SERPL-MCNC: 207 MG/DL (ref 70–99)
HBA1C MFR BLD: 5.9 % (ref 0–5.6)
HCT VFR BLD AUTO: 43.4 % (ref 40–53)
HDLC SERPL-MCNC: 57 MG/DL
HGB BLD-MCNC: 13.9 G/DL (ref 13.3–17.7)
LDH SERPL L TO P-CCNC: 219 U/L (ref 85–227)
LDLC SERPL CALC-MCNC: 96 MG/DL
LYMPHOCYTES # BLD AUTO: 13.9 10E9/L (ref 0.8–5.3)
LYMPHOCYTES NFR BLD AUTO: 85 %
MACROCYTES BLD QL SMEAR: PRESENT
MCH RBC QN AUTO: 33.4 PG (ref 26.5–33)
MCHC RBC AUTO-ENTMCNC: 32 G/DL (ref 31.5–36.5)
MCV RBC AUTO: 104 FL (ref 78–100)
MONOCYTES # BLD AUTO: 0 10E9/L (ref 0–1.3)
MONOCYTES NFR BLD AUTO: 0 %
NEUTROPHILS # BLD AUTO: 2.3 10E9/L (ref 1.6–8.3)
NEUTROPHILS NFR BLD AUTO: 14 %
NONHDLC SERPL-MCNC: 109 MG/DL
OVALOCYTES BLD QL SMEAR: SLIGHT
PLATELET # BLD AUTO: 109 10E9/L (ref 150–450)
PLATELET # BLD EST: ABNORMAL 10*3/UL
POTASSIUM SERPL-SCNC: 4.4 MMOL/L (ref 3.4–5.3)
PROT SERPL-MCNC: 6.9 G/DL (ref 6.8–8.8)
RBC # BLD AUTO: 4.16 10E12/L (ref 4.4–5.9)
SODIUM SERPL-SCNC: 140 MMOL/L (ref 133–144)
TRIGL SERPL-MCNC: 64 MG/DL
WBC # BLD AUTO: 16.3 10E9/L (ref 4–11)

## 2021-04-05 PROCEDURE — 83036 HEMOGLOBIN GLYCOSYLATED A1C: CPT | Performed by: INTERNAL MEDICINE

## 2021-04-05 PROCEDURE — 80061 LIPID PANEL: CPT | Performed by: INTERNAL MEDICINE

## 2021-04-05 PROCEDURE — 83615 LACTATE (LD) (LDH) ENZYME: CPT | Performed by: INTERNAL MEDICINE

## 2021-04-05 PROCEDURE — 85025 COMPLETE CBC W/AUTO DIFF WBC: CPT | Performed by: INTERNAL MEDICINE

## 2021-04-05 PROCEDURE — 36415 COLL VENOUS BLD VENIPUNCTURE: CPT

## 2021-04-05 PROCEDURE — 80053 COMPREHEN METABOLIC PANEL: CPT | Performed by: INTERNAL MEDICINE

## 2021-04-06 ENCOUNTER — VIRTUAL VISIT (OUTPATIENT)
Dept: ONCOLOGY | Facility: CLINIC | Age: 81
End: 2021-04-06
Attending: INTERNAL MEDICINE
Payer: COMMERCIAL

## 2021-04-06 DIAGNOSIS — C91.10 CLL (CHRONIC LYMPHOCYTIC LEUKEMIA) (H): Primary | ICD-10-CM

## 2021-04-06 PROCEDURE — 999N001193 HC VIDEO/TELEPHONE VISIT; NO CHARGE

## 2021-04-06 PROCEDURE — 99214 OFFICE O/P EST MOD 30 MIN: CPT | Mod: 95 | Performed by: INTERNAL MEDICINE

## 2021-04-06 NOTE — PROGRESS NOTES
Austin is a 80 year old who is being evaluated via a billable telephone visit.      What phone number would you like to be contacted at?     [phone]    How would you like to obtain your AVS? Isidoro         [facility]    Hematology/Oncology Established Patient Note      Today's Date: 4/6/21    Reason for Follow-up: CLL      HISTORY OF PRESENT ILLNESS: Austin Garza is a 80 year old male with PMHx of DMII, HTN who presented with leukocytosis.  In November 2017, he was found to have elevated WBC of 61.7K, hemoglobin of 10.4, and platelet of 115K.  There were no other CBC results available between 2010 and 2017.  Prior to 2010, he had normal CBC, with normal to mild anemia, and mild thrombocytopenia.   He was asymptomatic.    He underwent bone marrow biopsy on 11/21/17, which was consistent with chronic lymphocytic leukemia/small lymphocytic lymphoma, with 13% ringed sideroblasts identified.  The presence of increased numbers of ringed sideroblasts raises the possibility of an associated myelodysplastic syndrome.  Dysplastic changes are not identified though.  Many cases exhibiting ringed sideroblasts are metabolic origin, without significant risk of progression to acute leukemia, and these typically do not show dysplastic morphology as is the case with this specimen.  15% ringed sideroblasts are required for a diagnosis for RARS, which this specimen does not meet.  Flow cytometry is also consistent with CLL/SLL.  Cytogenetics show two (10%) of the metaphase cells comprise a clone characterized by a deletion within the proximal long arm of a chromosome 13 as the sole karyotypic abnormality.  Three (15%) metaphases had loss of the Y chromosome as the sole abnormality.    CT scan on 11/29/17 shows mildly enlarged left axillary lymph node and periaortic lymph nodes in the retroperitoneum of the abdomen.  Spleen is also enlarged.    On his staging CT scan for CLL, he was incidentally found  "to have a large infrarenal abdominal aortic aneurysm, measuring 7.6 cm.  He was seen by Dr. Thomas, and he underwent EVAR on 12/4/17.     He started ibrutinib on 5/4/20.  It was held 5/28/20-6/4/20 for tooth extraction.      INTERIM HISTORY: Austin is doing well.  He continues to take ibrutinib.  His wife noted he breathes heavily at times.  His hemoglobin is back to normal.  He notes some new lesions on his arms, like \"ink splatters\", but doesn't think they are bruising or a rash.  He isn't sure if it's from ibrutinib.          REVIEW OF SYSTEMS:   14 point ROS was reviewed and is negative other than as noted above in HPI.       HOME MEDICATIONS:  Current Outpatient Medications   Medication Sig Dispense Refill     aspirin (ASA) 81 MG tablet Take 1 tablet (81 mg) by mouth every evening (Patient taking differently: Take 325 mg by mouth daily ) 90 tablet 3     atorvastatin (LIPITOR) 20 MG tablet TAKE 1 TABLET BY MOUTH EVERYDAY AT BEDTIME 90 tablet 0     clopidogrel (PLAVIX) 75 MG tablet TAKE 1 TABLET BY MOUTH EVERY DAY 90 tablet 1     CONTOUR NEXT TEST test strip USE TO TEST BLOOD SUGAR 1-2 TIMES DAILY OR AS DIRECTED (ADJUSTING ORAL MEDICATIONS). 100 strip 1     fluocinonide (LIDEX) 0.05 % external cream Apply topically 2 times daily as needed Itchy bug bites. Use for up to 2 weeks at a time.       ibrutinib (IMBRUVICA) 420 MG tablet Take 1 tablet (420 mg) by mouth daily 28 tablet 0     ibrutinib (IMBRUVICA) 420 MG tablet Take 1 tablet (420 mg) by mouth daily 28 tablet 0     ibrutinib (IMBRUVICA) 420 MG tablet Take 1 tablet (420 mg) by mouth daily 28 tablet 0     ibrutinib (IMBRUVICA) 420 MG tablet Take 1 tablet (420 mg) by mouth daily 28 tablet 0     ibrutinib (IMBRUVICA) 420 MG tablet Take 1 tablet (420 mg) by mouth daily 28 tablet 0     losartan-hydrochlorothiazide (HYZAAR) 50-12.5 MG tablet TAKE 1 TABLET BY MOUTH EVERY DAY 90 tablet 3     metFORMIN (GLUCOPHAGE-XR) 500 MG 24 hr tablet Take 1 tablet (500 mg) by " mouth daily (with dinner) 90 tablet 1     Multiple Vitamins-Minerals (CENTRUM SILVER) per tablet Take 1 tablet by mouth daily           ALLERGIES:  Allergies   Allergen Reactions     Ace Inhibitors      cough         PAST MEDICAL HISTORY:  Past Medical History:   Diagnosis Date     AAA (abdominal aortic aneurysm)      BCC (basal cell carcinoma of skin) - face      CLL (chronic lymphocytic leukemia)      Diaphragmatic hernia      Diverticulitis of colon      Esophageal reflux      Essential hypertension      Hyperlipidemia      Irritable bowel syndrome      Macular degeneration (senile) of retina      Squamous Cell Carcinoma, Back 1988     Type 2 diabetes mellitus          PAST SURGICAL HISTORY:  Past Surgical History:   Procedure Laterality Date     APPENDECTOMY OPEN  1966     BONE MARROW BIOPSY, BONE SPECIMEN, NEEDLE/TROCAR N/A 11/21/2017    Procedure: BIOPSY BONE MARROW;  BONE MARROW BIOPSY;  Surgeon: Shady Garcia MD;  Location:  GI     COLONOSCOPY  2002     ENDOVASCULAR REPAIR ANEURYSM ABDOMINAL AORTA N/A 12/4/2017    Procedure: ENDOVASCULAR REPAIR ANEURYSM ABDOMINAL AORTA;  ENDOVASCULAR REPAIR ANEURYSM ABDOMINAL AORTA;  Surgeon: Milton Cazares MD;  Location:  OR     Fistulotomy with marsupialization for repair of fisture in ano  2007     IR ABDOMINAL AORTOGRAM  3/11/2019     IR VISCERAL EMBOLIZATION  5/13/2019     Multiple skin tags - resection  1998     Squamous cell skin cancer resection - back  1985-90         SOCIAL HISTORY:  Social History     Socioeconomic History     Marital status:      Spouse name: librado     Number of children: 0     Years of education: 17     Highest education level: Not on file   Occupational History     Occupation: retired   Social Needs     Financial resource strain: Not on file     Food insecurity     Worry: Not on file     Inability: Not on file     Transportation needs     Medical: Not on file     Non-medical: Not on file   Tobacco Use     Smoking  status: Former Smoker     Packs/day: 0.50     Years: 20.00     Pack years: 10.00     Quit date: 1975     Years since quittin.2     Smokeless tobacco: Former User   Substance and Sexual Activity     Alcohol use: Yes     Alcohol/week: 10.0 - 14.0 standard drinks     Types: 10 - 14 Standard drinks or equivalent per week     Comment: 2 drinks a day/wine     Drug use: No     Sexual activity: Never   Lifestyle     Physical activity     Days per week: Not on file     Minutes per session: Not on file     Stress: Not on file   Relationships     Social connections     Talks on phone: Not on file     Gets together: Not on file     Attends [employer] service: Not on file     Active member of club or organization: Not on file     Attends meetings of clubs or organizations: Not on file     Relationship status: Not on file     Intimate partner violence     Fear of current or ex partner: Not on file     Emotionally abused: Not on file     Physically abused: Not on file     Forced sexual activity: Not on file   Other Topics Concern     Parent/sibling w/ CABG, MI or angioplasty before 65F 55M? Not Asked   Social History Narrative     Not on file   He quit smoking in .  He drinks 1-3 glasses of wine a night with dinner.  He is retired, and used to work as a .  He lives with his wife in [place].  His cousin had lung cancer and  around age 69.  Otherwise, he denies family history of cancer.        FAMILY HISTORY:  Family History   Problem Relation Age of Onset     Cerebrovascular Disease Father         x 2     Hypertension Mother      C.A.D. Mother      Cancer Mother         skin     Eye Disorder Mother         glaucoma     Prostate Cancer No family hx of      Cancer - colorectal No family hx of      Glaucoma No family hx of      Macular Degeneration No family hx of          PHYSICAL EXAM:  Phone visit.      LABS:  CBC RESULTS:   Recent Labs   Lab Test 21  0913   WBC 16.3*   RBC 4.16*   HGB 13.9   HCT 43.4    *   MCH 33.4*   MCHC 32.0   RDW 13.0   *     Recent Labs   Lab Test 04/05/21  0913 03/03/21  1222    140   POTASSIUM 4.4 4.4   CHLORIDE 106 107   CO2 30 28   ANIONGAP 4 5   * 176*   BUN 24 28   CR 1.48* 1.57*   MAGDALENO 8.9 9.5     Lab Results   Component Value Date    AST 16 04/05/2021     Lab Results   Component Value Date    ALT 16 04/05/2021     Lab Results   Component Value Date    BILICONJ 0.0 04/29/2005      Lab Results   Component Value Date    BILITOTAL 0.9 04/05/2021     Lab Results   Component Value Date    ALBUMIN 3.8 04/05/2021     Lab Results   Component Value Date    PROTTOTAL 6.9 04/05/2021      Lab Results   Component Value Date    ALKPHOS 56 04/05/2021           ASSESSMENT/PLAN:  Austin Garza is a 80 year old male with:    1) CLL/SLL: BLACKWOOD stage III, with lymphocytosis, lymphadenopathy, splenomegaly, and anemia.  He has mild thrombocytopenia as well, but is above 100K.  He presented with leukocytosis with WBC of 61.7K, hemoglobin of 10.4, and platelet count of 115K on diagnosis.  Bone marrow biopsy and flow cytometry confirmed CLL/SLL.  CT scan shows mildly enlarged left axillary lymph node and periaortic lymph nodes in the retroperitoneum of the abdomen, with enlarged spleen.      Due to worsening lymphocyte count, anemia, thrombocytopenia, as well as fatigue and night sweats, he started ibrutinib on 5/4/20.      His WBC remains elevated, but has trended down nicely to 16.3.  Hemoglobin is normalized now.  Platelet count is stable.      -continue ibrutinib.  We previously discussed potential side effects, including bleeding risk.  He is not on warfarin and has no history of arrhythmias.  He is on aspirin and Plavix though.  I discussed with pharmacy.  There is no contraindication, but we will monitor closely for bleeding.    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 He completed radiation at [facility] with planned 60 Gy x 30 fractions. He has completed radiation and noted that he did not need any more follow-up for this.    3) Abdominal aortic aneurysm: measures up to 7.6 cm on CT scan, incidentally found.  He underwent EVAR on 12/4/17.  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  He is on Plavix now.  On 5/13/19, he underwent and percutaneous aneurysm sac puncture and endo leak embolization by IR on 5/13/19.  -follow-up with vascular surgery and IR    4) Diabetes, hypertension:  -following with PCP      Breana Perry MD  Hematology/Oncology  [facility]    Phone call duration: 10 minutes    Total time spent on day of visit, including review of tests, obtaining/reviewing separately obtained history, ordering medications/tests/procedures, communicating with PCP/consultants, and documenting in electronic medical record: 17 minutes

## 2021-04-06 NOTE — LETTER
4/6/2021         RE: Austin Garza  [address] [place] [address]        Dear Colleague,    Thank you for referring your patient, Austin Garza, to the [facility]. Please see a copy of my visit note below.    Austin is a 80 year old who is being evaluated via a billable telephone visit.      What phone number would you like to be contacted at?     [phone]    How would you like to obtain your AVS? Isidoro         [facility]    Hematology/Oncology Established Patient Note      Today's Date: 4/6/21    Reason for Follow-up: CLL      HISTORY OF PRESENT ILLNESS: Austin Garza is a 80 year old male with PMHx of DMII, HTN who presented with leukocytosis.  In November 2017, he was found to have elevated WBC of 61.7K, hemoglobin of 10.4, and platelet of 115K.  There were no other CBC results available between 2010 and 2017.  Prior to 2010, he had normal CBC, with normal to mild anemia, and mild thrombocytopenia.   He was asymptomatic.    He underwent bone marrow biopsy on 11/21/17, which was consistent with chronic lymphocytic leukemia/small lymphocytic lymphoma, with 13% ringed sideroblasts identified.  The presence of increased numbers of ringed sideroblasts raises the possibility of an associated myelodysplastic syndrome.  Dysplastic changes are not identified though.  Many cases exhibiting ringed sideroblasts are metabolic origin, without significant risk of progression to acute leukemia, and these typically do not show dysplastic morphology as is the case with this specimen.  15% ringed sideroblasts are required for a diagnosis for RARS, which this specimen does not meet.  Flow cytometry is also consistent with CLL/SLL.  Cytogenetics show two (10%) of the metaphase cells comprise a clone characterized by a deletion within the proximal long arm of a chromosome 13 as the sole karyotypic abnormality.  Three (15%) metaphases had loss of the  "Y chromosome as the sole abnormality.    CT scan on 11/29/17 shows mildly enlarged left axillary lymph node and periaortic lymph nodes in the retroperitoneum of the abdomen.  Spleen is also enlarged.    On his staging CT scan for CLL, he was incidentally found to have a large infrarenal abdominal aortic aneurysm, measuring 7.6 cm.  He was seen by Dr. Thomas, and he underwent EVAR on 12/4/17.     He started ibrutinib on 5/4/20.  It was held 5/28/20-6/4/20 for tooth extraction.      INTERIM HISTORY: Austin is doing well.  He continues to take ibrutinib.  His wife noted he breathes heavily at times.  His hemoglobin is back to normal.  He notes some new lesions on his arms, like \"ink splatters\", but doesn't think they are bruising or a rash.  He isn't sure if it's from ibrutinib.          REVIEW OF SYSTEMS:   14 point ROS was reviewed and is negative other than as noted above in HPI.       HOME MEDICATIONS:  Current Outpatient Medications   Medication Sig Dispense Refill     aspirin (ASA) 81 MG tablet Take 1 tablet (81 mg) by mouth every evening (Patient taking differently: Take 325 mg by mouth daily ) 90 tablet 3     atorvastatin (LIPITOR) 20 MG tablet TAKE 1 TABLET BY MOUTH EVERYDAY AT BEDTIME 90 tablet 0     clopidogrel (PLAVIX) 75 MG tablet TAKE 1 TABLET BY MOUTH EVERY DAY 90 tablet 1     CONTOUR NEXT TEST test strip USE TO TEST BLOOD SUGAR 1-2 TIMES DAILY OR AS DIRECTED (ADJUSTING ORAL MEDICATIONS). 100 strip 1     fluocinonide (LIDEX) 0.05 % external cream Apply topically 2 times daily as needed Itchy bug bites. Use for up to 2 weeks at a time.       ibrutinib (IMBRUVICA) 420 MG tablet Take 1 tablet (420 mg) by mouth daily 28 tablet 0     ibrutinib (IMBRUVICA) 420 MG tablet Take 1 tablet (420 mg) by mouth daily 28 tablet 0     ibrutinib (IMBRUVICA) 420 MG tablet Take 1 tablet (420 mg) by mouth daily 28 tablet 0     ibrutinib (IMBRUVICA) 420 MG tablet Take 1 tablet (420 mg) by mouth daily 28 tablet 0     ibrutinib " (IMBRUVICA) 420 MG tablet Take 1 tablet (420 mg) by mouth daily 28 tablet 0     losartan-hydrochlorothiazide (HYZAAR) 50-12.5 MG tablet TAKE 1 TABLET BY MOUTH EVERY DAY 90 tablet 3     metFORMIN (GLUCOPHAGE-XR) 500 MG 24 hr tablet Take 1 tablet (500 mg) by mouth daily (with dinner) 90 tablet 1     Multiple Vitamins-Minerals (CENTRUM SILVER) per tablet Take 1 tablet by mouth daily           ALLERGIES:  Allergies   Allergen Reactions     Ace Inhibitors      cough         PAST MEDICAL HISTORY:  Past Medical History:   Diagnosis Date     AAA (abdominal aortic aneurysm)      BCC (basal cell carcinoma of skin) - face      CLL (chronic lymphocytic leukemia)      Diaphragmatic hernia      Diverticulitis of colon      Esophageal reflux      Essential hypertension      Hyperlipidemia      Irritable bowel syndrome      Macular degeneration (senile) of retina      Squamous Cell Carcinoma, Back 1988     Type 2 diabetes mellitus          PAST SURGICAL HISTORY:  Past Surgical History:   Procedure Laterality Date     APPENDECTOMY OPEN  1966     BONE MARROW BIOPSY, BONE SPECIMEN, NEEDLE/TROCAR N/A 11/21/2017    Procedure: BIOPSY BONE MARROW;  BONE MARROW BIOPSY;  Surgeon: Shady Garcia MD;  Location:  GI     COLONOSCOPY  2002     ENDOVASCULAR REPAIR ANEURYSM ABDOMINAL AORTA N/A 12/4/2017    Procedure: ENDOVASCULAR REPAIR ANEURYSM ABDOMINAL AORTA;  ENDOVASCULAR REPAIR ANEURYSM ABDOMINAL AORTA;  Surgeon: Milton Cazares MD;  Location:  OR     Fistulotomy with marsupialization for repair of fisture in ano  2007     IR ABDOMINAL AORTOGRAM  3/11/2019     IR VISCERAL EMBOLIZATION  5/13/2019     Multiple skin tags - resection  1998     Squamous cell skin cancer resection - back  1985-90         SOCIAL HISTORY:  Social History     Socioeconomic History     Marital status:      Spouse name: librado     Number of children: 0     Years of education: 17     Highest education level: Not on file   Occupational History      Occupation: retired   Social Needs     Financial resource strain: Not on file     Food insecurity     Worry: Not on file     Inability: Not on file     Transportation needs     Medical: Not on file     Non-medical: Not on file   Tobacco Use     Smoking status: Former Smoker     Packs/day: 0.50     Years: 20.00     Pack years: 10.00     Quit date: 1975     Years since quittin.2     Smokeless tobacco: Former User   Substance and Sexual Activity     Alcohol use: Yes     Alcohol/week: 10.0 - 14.0 standard drinks     Types: 10 - 14 Standard drinks or equivalent per week     Comment: 2 drinks a day/wine     Drug use: No     Sexual activity: Never   Lifestyle     Physical activity     Days per week: Not on file     Minutes per session: Not on file     Stress: Not on file   Relationships     Social connections     Talks on phone: Not on file     Gets together: Not on file     Attends [employer] service: Not on file     Active member of club or organization: Not on file     Attends meetings of clubs or organizations: Not on file     Relationship status: Not on file     Intimate partner violence     Fear of current or ex partner: Not on file     Emotionally abused: Not on file     Physically abused: Not on file     Forced sexual activity: Not on file   Other Topics Concern     Parent/sibling w/ CABG, MI or angioplasty before 65F 55M? Not Asked   Social History Narrative     Not on file   He quit smoking in .  He drinks 1-3 glasses of wine a night with dinner.  He is retired, and used to work as a .  He lives with his wife in [place].  His cousin had lung cancer and  around age 69.  Otherwise, he denies family history of cancer.        FAMILY HISTORY:  Family History   Problem Relation Age of Onset     Cerebrovascular Disease Father         x 2     Hypertension Mother      C.A.D. Mother      Cancer Mother         skin     Eye Disorder Mother         glaucoma     Prostate Cancer No family hx of      Cancer  - colorectal No family hx of      Glaucoma No family hx of      Macular Degeneration No family hx of          PHYSICAL EXAM:  Phone visit.      LABS:  CBC RESULTS:   Recent Labs   Lab Test 04/05/21 0913   WBC 16.3*   RBC 4.16*   HGB 13.9   HCT 43.4   *   MCH 33.4*   MCHC 32.0   RDW 13.0   *     Recent Labs   Lab Test 04/05/21  0913 03/03/21  1222    140   POTASSIUM 4.4 4.4   CHLORIDE 106 107   CO2 30 28   ANIONGAP 4 5   * 176*   BUN 24 28   CR 1.48* 1.57*   MAGDALENO 8.9 9.5     Lab Results   Component Value Date    AST 16 04/05/2021     Lab Results   Component Value Date    ALT 16 04/05/2021     Lab Results   Component Value Date    BILICONJ 0.0 04/29/2005      Lab Results   Component Value Date    BILITOTAL 0.9 04/05/2021     Lab Results   Component Value Date    ALBUMIN 3.8 04/05/2021     Lab Results   Component Value Date    PROTTOTAL 6.9 04/05/2021      Lab Results   Component Value Date    ALKPHOS 56 04/05/2021           ASSESSMENT/PLAN:  Austin Garza is a 80 year old male with:    1) CLL/SLL: BLACKWOOD stage III, with lymphocytosis, lymphadenopathy, splenomegaly, and anemia.  He has mild thrombocytopenia as well, but is above 100K.  He presented with leukocytosis with WBC of 61.7K, hemoglobin of 10.4, and platelet count of 115K on diagnosis.  Bone marrow biopsy and flow cytometry confirmed CLL/SLL.  CT scan shows mildly enlarged left axillary lymph node and periaortic lymph nodes in the retroperitoneum of the abdomen, with enlarged spleen.      Due to worsening lymphocyte count, anemia, thrombocytopenia, as well as fatigue and night sweats, he started ibrutinib on 5/4/20.      His WBC remains elevated, but has trended down nicely to 16.3.  Hemoglobin is normalized now.  Platelet count is stable.      -continue ibrutinib.  We previously discussed potential side effects, including bleeding risk.  He is not on warfarin and has no history of arrhythmias.  He is on aspirin and Plavix  though.  I discussed with pharmacy.  There is no contraindication, but we will monitor closely for bleeding.    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 He completed radiation at [facility] with planned 60 Gy x 30 fractions. He has completed radiation and noted that he did not need any more follow-up for this.    3) Abdominal aortic aneurysm: measures up to 7.6 cm on CT scan, incidentally found.  He underwent EVAR on 12/4/17.  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  He is on Plavix now.  On 5/13/19, he underwent and percutaneous aneurysm sac puncture and endo leak embolization by IR on 5/13/19.  -follow-up with vascular surgery and IR    4) Diabetes, hypertension:  -following with PCP      Breana Perry MD  Hematology/Oncology  [facility]    Phone call duration: 10 minutes    Total time spent on day of visit, including review of tests, obtaining/reviewing separately obtained history, ordering medications/tests/procedures, communicating with PCP/consultants, and documenting in electronic medical record: 17 minutes        Again, thank you for allowing me to participate in the care of your patient.        Sincerely,        Breana Perry MD

## 2021-04-06 NOTE — LETTER
4/6/2021         RE: Austin Garza  [address] [place] [address]        Dear Colleague,    Thank you for referring your patient, Austin Garza, to the [facility]. Please see a copy of my visit note below.    uAstin is a 80 year old who is being evaluated via a billable telephone visit.      What phone number would you like to be contacted at?     [phone]    How would you like to obtain your AVS? Isidoro         [facility]    Hematology/Oncology Established Patient Note      Today's Date: 4/6/21    Reason for Follow-up: CLL      HISTORY OF PRESENT ILLNESS: Austin Garza is a 80 year old male with PMHx of DMII, HTN who presented with leukocytosis.  In November 2017, he was found to have elevated WBC of 61.7K, hemoglobin of 10.4, and platelet of 115K.  There were no other CBC results available between 2010 and 2017.  Prior to 2010, he had normal CBC, with normal to mild anemia, and mild thrombocytopenia.   He was asymptomatic.    He underwent bone marrow biopsy on 11/21/17, which was consistent with chronic lymphocytic leukemia/small lymphocytic lymphoma, with 13% ringed sideroblasts identified.  The presence of increased numbers of ringed sideroblasts raises the possibility of an associated myelodysplastic syndrome.  Dysplastic changes are not identified though.  Many cases exhibiting ringed sideroblasts are metabolic origin, without significant risk of progression to acute leukemia, and these typically do not show dysplastic morphology as is the case with this specimen.  15% ringed sideroblasts are required for a diagnosis for RARS, which this specimen does not meet.  Flow cytometry is also consistent with CLL/SLL.  Cytogenetics show two (10%) of the metaphase cells comprise a clone characterized by a deletion within the proximal long arm of a chromosome 13 as the sole karyotypic abnormality.  Three (15%) metaphases had loss of the  "Y chromosome as the sole abnormality.    CT scan on 11/29/17 shows mildly enlarged left axillary lymph node and periaortic lymph nodes in the retroperitoneum of the abdomen.  Spleen is also enlarged.    On his staging CT scan for CLL, he was incidentally found to have a large infrarenal abdominal aortic aneurysm, measuring 7.6 cm.  He was seen by Dr. Thomas, and he underwent EVAR on 12/4/17.     He started ibrutinib on 5/4/20.  It was held 5/28/20-6/4/20 for tooth extraction.      INTERIM HISTORY: Austin is doing well.  He continues to take ibrutinib.  His wife noted he breathes heavily at times.  His hemoglobin is back to normal.  He notes some new lesions on his arms, like \"ink splatters\", but doesn't think they are bruising or a rash.  He isn't sure if it's from ibrutinib.          REVIEW OF SYSTEMS:   14 point ROS was reviewed and is negative other than as noted above in HPI.       HOME MEDICATIONS:  Current Outpatient Medications   Medication Sig Dispense Refill     aspirin (ASA) 81 MG tablet Take 1 tablet (81 mg) by mouth every evening (Patient taking differently: Take 325 mg by mouth daily ) 90 tablet 3     atorvastatin (LIPITOR) 20 MG tablet TAKE 1 TABLET BY MOUTH EVERYDAY AT BEDTIME 90 tablet 0     clopidogrel (PLAVIX) 75 MG tablet TAKE 1 TABLET BY MOUTH EVERY DAY 90 tablet 1     CONTOUR NEXT TEST test strip USE TO TEST BLOOD SUGAR 1-2 TIMES DAILY OR AS DIRECTED (ADJUSTING ORAL MEDICATIONS). 100 strip 1     fluocinonide (LIDEX) 0.05 % external cream Apply topically 2 times daily as needed Itchy bug bites. Use for up to 2 weeks at a time.       ibrutinib (IMBRUVICA) 420 MG tablet Take 1 tablet (420 mg) by mouth daily 28 tablet 0     ibrutinib (IMBRUVICA) 420 MG tablet Take 1 tablet (420 mg) by mouth daily 28 tablet 0     ibrutinib (IMBRUVICA) 420 MG tablet Take 1 tablet (420 mg) by mouth daily 28 tablet 0     ibrutinib (IMBRUVICA) 420 MG tablet Take 1 tablet (420 mg) by mouth daily 28 tablet 0     ibrutinib " (IMBRUVICA) 420 MG tablet Take 1 tablet (420 mg) by mouth daily 28 tablet 0     losartan-hydrochlorothiazide (HYZAAR) 50-12.5 MG tablet TAKE 1 TABLET BY MOUTH EVERY DAY 90 tablet 3     metFORMIN (GLUCOPHAGE-XR) 500 MG 24 hr tablet Take 1 tablet (500 mg) by mouth daily (with dinner) 90 tablet 1     Multiple Vitamins-Minerals (CENTRUM SILVER) per tablet Take 1 tablet by mouth daily           ALLERGIES:  Allergies   Allergen Reactions     Ace Inhibitors      cough         PAST MEDICAL HISTORY:  Past Medical History:   Diagnosis Date     AAA (abdominal aortic aneurysm)      BCC (basal cell carcinoma of skin) - face      CLL (chronic lymphocytic leukemia)      Diaphragmatic hernia      Diverticulitis of colon      Esophageal reflux      Essential hypertension      Hyperlipidemia      Irritable bowel syndrome      Macular degeneration (senile) of retina      Squamous Cell Carcinoma, Back 1988     Type 2 diabetes mellitus          PAST SURGICAL HISTORY:  Past Surgical History:   Procedure Laterality Date     APPENDECTOMY OPEN  1966     BONE MARROW BIOPSY, BONE SPECIMEN, NEEDLE/TROCAR N/A 11/21/2017    Procedure: BIOPSY BONE MARROW;  BONE MARROW BIOPSY;  Surgeon: Shady Garcia MD;  Location:  GI     COLONOSCOPY  2002     ENDOVASCULAR REPAIR ANEURYSM ABDOMINAL AORTA N/A 12/4/2017    Procedure: ENDOVASCULAR REPAIR ANEURYSM ABDOMINAL AORTA;  ENDOVASCULAR REPAIR ANEURYSM ABDOMINAL AORTA;  Surgeon: Milton Cazares MD;  Location:  OR     Fistulotomy with marsupialization for repair of fisture in ano  2007     IR ABDOMINAL AORTOGRAM  3/11/2019     IR VISCERAL EMBOLIZATION  5/13/2019     Multiple skin tags - resection  1998     Squamous cell skin cancer resection - back  1985-90         SOCIAL HISTORY:  Social History     Socioeconomic History     Marital status:      Spouse name: librado     Number of children: 0     Years of education: 17     Highest education level: Not on file   Occupational History      Occupation: retired   Social Needs     Financial resource strain: Not on file     Food insecurity     Worry: Not on file     Inability: Not on file     Transportation needs     Medical: Not on file     Non-medical: Not on file   Tobacco Use     Smoking status: Former Smoker     Packs/day: 0.50     Years: 20.00     Pack years: 10.00     Quit date: 1975     Years since quittin.2     Smokeless tobacco: Former User   Substance and Sexual Activity     Alcohol use: Yes     Alcohol/week: 10.0 - 14.0 standard drinks     Types: 10 - 14 Standard drinks or equivalent per week     Comment: 2 drinks a day/wine     Drug use: No     Sexual activity: Never   Lifestyle     Physical activity     Days per week: Not on file     Minutes per session: Not on file     Stress: Not on file   Relationships     Social connections     Talks on phone: Not on file     Gets together: Not on file     Attends [employer] service: Not on file     Active member of club or organization: Not on file     Attends meetings of clubs or organizations: Not on file     Relationship status: Not on file     Intimate partner violence     Fear of current or ex partner: Not on file     Emotionally abused: Not on file     Physically abused: Not on file     Forced sexual activity: Not on file   Other Topics Concern     Parent/sibling w/ CABG, MI or angioplasty before 65F 55M? Not Asked   Social History Narrative     Not on file   He quit smoking in .  He drinks 1-3 glasses of wine a night with dinner.  He is retired, and used to work as a .  He lives with his wife in [place].  His cousin had lung cancer and  around age 69.  Otherwise, he denies family history of cancer.        FAMILY HISTORY:  Family History   Problem Relation Age of Onset     Cerebrovascular Disease Father         x 2     Hypertension Mother      C.A.D. Mother      Cancer Mother         skin     Eye Disorder Mother         glaucoma     Prostate Cancer No family hx of      Cancer  - colorectal No family hx of      Glaucoma No family hx of      Macular Degeneration No family hx of          PHYSICAL EXAM:  Phone visit.      LABS:  CBC RESULTS:   Recent Labs   Lab Test 04/05/21 0913   WBC 16.3*   RBC 4.16*   HGB 13.9   HCT 43.4   *   MCH 33.4*   MCHC 32.0   RDW 13.0   *     Recent Labs   Lab Test 04/05/21  0913 03/03/21  1222    140   POTASSIUM 4.4 4.4   CHLORIDE 106 107   CO2 30 28   ANIONGAP 4 5   * 176*   BUN 24 28   CR 1.48* 1.57*   MAGDALENO 8.9 9.5     Lab Results   Component Value Date    AST 16 04/05/2021     Lab Results   Component Value Date    ALT 16 04/05/2021     Lab Results   Component Value Date    BILICONJ 0.0 04/29/2005      Lab Results   Component Value Date    BILITOTAL 0.9 04/05/2021     Lab Results   Component Value Date    ALBUMIN 3.8 04/05/2021     Lab Results   Component Value Date    PROTTOTAL 6.9 04/05/2021      Lab Results   Component Value Date    ALKPHOS 56 04/05/2021           ASSESSMENT/PLAN:  Austin Garza is a 80 year old male with:    1) CLL/SLL: BLACKWOOD stage III, with lymphocytosis, lymphadenopathy, splenomegaly, and anemia.  He has mild thrombocytopenia as well, but is above 100K.  He presented with leukocytosis with WBC of 61.7K, hemoglobin of 10.4, and platelet count of 115K on diagnosis.  Bone marrow biopsy and flow cytometry confirmed CLL/SLL.  CT scan shows mildly enlarged left axillary lymph node and periaortic lymph nodes in the retroperitoneum of the abdomen, with enlarged spleen.      Due to worsening lymphocyte count, anemia, thrombocytopenia, as well as fatigue and night sweats, he started ibrutinib on 5/4/20.      His WBC remains elevated, but has trended down nicely to 16.3.  Hemoglobin is normalized now.  Platelet count is stable.      -continue ibrutinib.  We previously discussed potential side effects, including bleeding risk.  He is not on warfarin and has no history of arrhythmias.  He is on aspirin and Plavix  though.  I discussed with pharmacy.  There is no contraindication, but we will monitor closely for bleeding.    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 He completed radiation at [facility] with planned 60 Gy x 30 fractions. He has completed radiation and noted that he did not need any more follow-up for this.    3) Abdominal aortic aneurysm: measures up to 7.6 cm on CT scan, incidentally found.  He underwent EVAR on 12/4/17.  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  He is on Plavix now.  On 5/13/19, he underwent and percutaneous aneurysm sac puncture and endo leak embolization by IR on 5/13/19.  -follow-up with vascular surgery and IR    4) Diabetes, hypertension:  -following with PCP      Breana Perry MD  Hematology/Oncology  [facility]    Phone call duration: 10 minutes    Total time spent on day of visit, including review of tests, obtaining/reviewing separately obtained history, ordering medications/tests/procedures, communicating with PCP/consultants, and documenting in electronic medical record: 17 minutes        Again, thank you for allowing me to participate in the care of your patient.        Sincerely,        Breana Perry MD

## 2021-04-07 NOTE — PATIENT INSTRUCTIONS
Labs scheduled 5/4, 6/1  Appt with Dr. Perry scheduled 6/8  Tosha Burris RN on 4/7/2021 at 4:07 PM

## 2021-05-03 DIAGNOSIS — C91.10 CLL (CHRONIC LYMPHOCYTIC LEUKEMIA) (H): Primary | ICD-10-CM

## 2021-05-04 ENCOUNTER — HOSPITAL ENCOUNTER (OUTPATIENT)
Facility: CLINIC | Age: 81
Setting detail: SPECIMEN
Discharge: HOME | End: 2021-05-04
Attending: INTERNAL MEDICINE | Admitting: INTERNAL MEDICINE
Payer: COMMERCIAL

## 2021-05-04 DIAGNOSIS — C91.10 CLL (CHRONIC LYMPHOCYTIC LEUKEMIA) (H): ICD-10-CM

## 2021-05-04 LAB
ALBUMIN SERPL-MCNC: 3.6 G/DL (ref 3.4–5)
ALP SERPL-CCNC: 62 U/L (ref 40–150)
ALT SERPL W P-5'-P-CCNC: 17 U/L (ref 0–70)
ANION GAP SERPL CALCULATED.3IONS-SCNC: 2 MMOL/L (ref 3–14)
AST SERPL W P-5'-P-CCNC: 9 U/L (ref 0–45)
BASOPHILS # BLD AUTO: 0 10E9/L (ref 0–0.2)
BASOPHILS NFR BLD AUTO: 0 %
BILIRUB SERPL-MCNC: 0.5 MG/DL (ref 0.2–1.3)
BUN SERPL-MCNC: 27 MG/DL (ref 7–30)
CALCIUM SERPL-MCNC: 9.3 MG/DL (ref 8.5–10.1)
CHLORIDE SERPL-SCNC: 104 MMOL/L (ref 94–109)
CO2 SERPL-SCNC: 31 MMOL/L (ref 20–32)
CREAT SERPL-MCNC: 1.57 MG/DL (ref 0.66–1.25)
DIFFERENTIAL METHOD BLD: ABNORMAL
EOSINOPHIL # BLD AUTO: 0 10E9/L (ref 0–0.7)
EOSINOPHIL NFR BLD AUTO: 0 %
ERYTHROCYTE [DISTWIDTH] IN BLOOD BY AUTOMATED COUNT: 12.6 % (ref 10–15)
GFR SERPL CREATININE-BSD FRML MDRD: 41 ML/MIN/{1.73_M2}
GLUCOSE SERPL-MCNC: 211 MG/DL (ref 70–99)
HCT VFR BLD AUTO: 40.6 % (ref 40–53)
HGB BLD-MCNC: 13.5 G/DL (ref 13.3–17.7)
LDH SERPL L TO P-CCNC: 189 U/L (ref 85–227)
LYMPHOCYTES # BLD AUTO: 13.9 10E9/L (ref 0.8–5.3)
LYMPHOCYTES NFR BLD AUTO: 88 %
MACROCYTES BLD QL SMEAR: PRESENT
MCH RBC QN AUTO: 33.3 PG (ref 26.5–33)
MCHC RBC AUTO-ENTMCNC: 33.3 G/DL (ref 31.5–36.5)
MCV RBC AUTO: 100 FL (ref 78–100)
MONOCYTES # BLD AUTO: 0 10E9/L (ref 0–1.3)
MONOCYTES NFR BLD AUTO: 0 %
NEUTROPHILS # BLD AUTO: 1.9 10E9/L (ref 1.6–8.3)
NEUTROPHILS NFR BLD AUTO: 12 %
PLATELET # BLD AUTO: 125 10E9/L (ref 150–450)
PLATELET # BLD EST: ABNORMAL 10*3/UL
POTASSIUM SERPL-SCNC: 4.2 MMOL/L (ref 3.4–5.3)
PROT SERPL-MCNC: 6.9 G/DL (ref 6.8–8.8)
RBC # BLD AUTO: 4.06 10E12/L (ref 4.4–5.9)
SODIUM SERPL-SCNC: 137 MMOL/L (ref 133–144)
WBC # BLD AUTO: 15.8 10E9/L (ref 4–11)

## 2021-05-04 PROCEDURE — 36415 COLL VENOUS BLD VENIPUNCTURE: CPT

## 2021-05-04 PROCEDURE — 85025 COMPLETE CBC W/AUTO DIFF WBC: CPT | Performed by: INTERNAL MEDICINE

## 2021-05-04 PROCEDURE — 80053 COMPREHEN METABOLIC PANEL: CPT | Performed by: INTERNAL MEDICINE

## 2021-05-04 PROCEDURE — 83615 LACTATE (LD) (LDH) ENZYME: CPT | Performed by: INTERNAL MEDICINE

## 2021-06-01 ENCOUNTER — HOSPITAL ENCOUNTER (OUTPATIENT)
Facility: CLINIC | Age: 81
Setting detail: SPECIMEN
Discharge: HOME | End: 2021-06-01
Attending: INTERNAL MEDICINE | Admitting: INTERNAL MEDICINE
Payer: COMMERCIAL

## 2021-06-01 DIAGNOSIS — C91.10 CLL (CHRONIC LYMPHOCYTIC LEUKEMIA) (H): ICD-10-CM

## 2021-06-01 LAB
ALBUMIN SERPL-MCNC: 3.6 G/DL (ref 3.4–5)
ALP SERPL-CCNC: 58 U/L (ref 40–150)
ALT SERPL W P-5'-P-CCNC: 21 U/L (ref 0–70)
ANION GAP SERPL CALCULATED.3IONS-SCNC: 2 MMOL/L (ref 3–14)
AST SERPL W P-5'-P-CCNC: 15 U/L (ref 0–45)
BASOPHILS # BLD AUTO: 0 10E9/L (ref 0–0.2)
BASOPHILS NFR BLD AUTO: 0 %
BILIRUB SERPL-MCNC: 0.7 MG/DL (ref 0.2–1.3)
BUN SERPL-MCNC: 26 MG/DL (ref 7–30)
CALCIUM SERPL-MCNC: 8.9 MG/DL (ref 8.5–10.1)
CHLORIDE SERPL-SCNC: 104 MMOL/L (ref 94–109)
CO2 SERPL-SCNC: 30 MMOL/L (ref 20–32)
CREAT SERPL-MCNC: 1.51 MG/DL (ref 0.66–1.25)
DIFFERENTIAL METHOD BLD: ABNORMAL
EOSINOPHIL # BLD AUTO: 0.1 10E9/L (ref 0–0.7)
EOSINOPHIL NFR BLD AUTO: 1 %
ERYTHROCYTE [DISTWIDTH] IN BLOOD BY AUTOMATED COUNT: 13 % (ref 10–15)
GFR SERPL CREATININE-BSD FRML MDRD: 43 ML/MIN/{1.73_M2}
GLUCOSE SERPL-MCNC: 215 MG/DL (ref 70–99)
HCT VFR BLD AUTO: 41.3 % (ref 40–53)
HGB BLD-MCNC: 13.5 G/DL (ref 13.3–17.7)
LDH SERPL L TO P-CCNC: 199 U/L (ref 85–227)
LYMPHOCYTES # BLD AUTO: 10.2 10E9/L (ref 0.8–5.3)
LYMPHOCYTES NFR BLD AUTO: 80 %
MCH RBC QN AUTO: 32 PG (ref 26.5–33)
MCHC RBC AUTO-ENTMCNC: 32.7 G/DL (ref 31.5–36.5)
MCV RBC AUTO: 98 FL (ref 78–100)
METAMYELOCYTES # BLD: 0.1 10E9/L
METAMYELOCYTES NFR BLD MANUAL: 1 %
MONOCYTES # BLD AUTO: 0.1 10E9/L (ref 0–1.3)
MONOCYTES NFR BLD AUTO: 1 %
NEUTROPHILS # BLD AUTO: 2.2 10E9/L (ref 1.6–8.3)
NEUTROPHILS NFR BLD AUTO: 17 %
PLATELET # BLD AUTO: 119 10E9/L (ref 150–450)
PLATELET # BLD EST: ABNORMAL 10*3/UL
POTASSIUM SERPL-SCNC: 4.2 MMOL/L (ref 3.4–5.3)
PROT SERPL-MCNC: 7 G/DL (ref 6.8–8.8)
RBC # BLD AUTO: 4.22 10E12/L (ref 4.4–5.9)
RBC MORPH BLD: ABNORMAL
SODIUM SERPL-SCNC: 136 MMOL/L (ref 133–144)
WBC # BLD AUTO: 12.7 10E9/L (ref 4–11)

## 2021-06-01 PROCEDURE — 83615 LACTATE (LD) (LDH) ENZYME: CPT | Performed by: INTERNAL MEDICINE

## 2021-06-01 PROCEDURE — 36415 COLL VENOUS BLD VENIPUNCTURE: CPT

## 2021-06-01 PROCEDURE — 85025 COMPLETE CBC W/AUTO DIFF WBC: CPT | Performed by: INTERNAL MEDICINE

## 2021-06-01 PROCEDURE — 80053 COMPREHEN METABOLIC PANEL: CPT | Performed by: INTERNAL MEDICINE

## 2021-06-08 ENCOUNTER — ONCOLOGY VISIT (OUTPATIENT)
Dept: ONCOLOGY | Facility: CLINIC | Age: 81
End: 2021-06-08
Attending: INTERNAL MEDICINE
Payer: COMMERCIAL

## 2021-06-08 VITALS
WEIGHT: 213.7 LBS | HEIGHT: 69 IN | RESPIRATION RATE: 16 BRPM | DIASTOLIC BLOOD PRESSURE: 90 MMHG | HEART RATE: 80 BPM | SYSTOLIC BLOOD PRESSURE: 168 MMHG | BODY MASS INDEX: 31.65 KG/M2 | TEMPERATURE: 97.7 F | OXYGEN SATURATION: 98 %

## 2021-06-08 DIAGNOSIS — C91.10 CLL (CHRONIC LYMPHOCYTIC LEUKEMIA) (H): Primary | ICD-10-CM

## 2021-06-08 PROCEDURE — 99214 OFFICE O/P EST MOD 30 MIN: CPT | Performed by: INTERNAL MEDICINE

## 2021-06-08 PROCEDURE — G0463 HOSPITAL OUTPT CLINIC VISIT: HCPCS

## 2021-06-08 ASSESSMENT — PAIN SCALES - GENERAL: PAINLEVEL: NO PAIN (0)

## 2021-06-08 ASSESSMENT — MIFFLIN-ST. JEOR: SCORE: 1679.72

## 2021-06-08 NOTE — PROGRESS NOTES
[facility]    Hematology/Oncology Established Patient Note      Today's Date: 6/8/21    Reason for Follow-up: CLL      HISTORY OF PRESENT ILLNESS: Austin Garza is a 80 year old male with PMHx of DMII, HTN who presented with leukocytosis.  In November 2017, he was found to have elevated WBC of 61.7K, hemoglobin of 10.4, and platelet of 115K.  There were no other CBC results available between 2010 and 2017.  Prior to 2010, he had normal CBC, with normal to mild anemia, and mild thrombocytopenia.   He was asymptomatic.    He underwent bone marrow biopsy on 11/21/17, which was consistent with chronic lymphocytic leukemia/small lymphocytic lymphoma, with 13% ringed sideroblasts identified.  The presence of increased numbers of ringed sideroblasts raises the possibility of an associated myelodysplastic syndrome.  Dysplastic changes are not identified though.  Many cases exhibiting ringed sideroblasts are metabolic origin, without significant risk of progression to acute leukemia, and these typically do not show dysplastic morphology as is the case with this specimen.  15% ringed sideroblasts are required for a diagnosis for RARS, which this specimen does not meet.  Flow cytometry is also consistent with CLL/SLL.  Cytogenetics show two (10%) of the metaphase cells comprise a clone characterized by a deletion within the proximal long arm of a chromosome 13 as the sole karyotypic abnormality.  Three (15%) metaphases had loss of the Y chromosome as the sole abnormality.    CT scan on 11/29/17 shows mildly enlarged left axillary lymph node and periaortic lymph nodes in the retroperitoneum of the abdomen.  Spleen is also enlarged.    On his staging CT scan for CLL, he was incidentally found to have a large infrarenal abdominal aortic aneurysm, measuring 7.6 cm.  He was seen by Dr. Thomas, and he underwent EVAR on 12/4/17.     He started ibrutinib on 5/4/20.  It was held 5/28/20-6/4/20 for tooth  extraction.      INTERIM HISTORY: Austin is doing well.  He notes that he has had trouble sleeping, increased urination, and his feet feel hot at times at night.  He also developed some black dots on his palms.          REVIEW OF SYSTEMS:   14 point ROS was reviewed and is negative other than as noted above in HPI.       HOME MEDICATIONS:  Current Outpatient Medications   Medication Sig Dispense Refill     aspirin (ASA) 81 MG tablet Take 1 tablet (81 mg) by mouth every evening (Patient taking differently: Take 325 mg by mouth daily ) 90 tablet 3     atorvastatin (LIPITOR) 20 MG tablet TAKE 1 TABLET BY MOUTH EVERYDAY AT BEDTIME 90 tablet 0     clopidogrel (PLAVIX) 75 MG tablet TAKE 1 TABLET BY MOUTH EVERY DAY 90 tablet 1     CONTOUR NEXT TEST test strip USE TO TEST BLOOD SUGAR 1-2 TIMES DAILY OR AS DIRECTED (ADJUSTING ORAL MEDICATIONS). 100 strip 1     fluocinonide (LIDEX) 0.05 % external cream Apply topically 2 times daily as needed Itchy bug bites. Use for up to 2 weeks at a time.       ibrutinib (IMBRUVICA) 420 MG tablet Take 1 tablet (420 mg) by mouth daily 28 tablet 0     losartan-hydrochlorothiazide (HYZAAR) 50-12.5 MG tablet TAKE 1 TABLET BY MOUTH EVERY DAY 90 tablet 3     metFORMIN (GLUCOPHAGE-XR) 500 MG 24 hr tablet Take 1 tablet (500 mg) by mouth daily (with dinner) 90 tablet 1     Multiple Vitamins-Minerals (CENTRUM SILVER) per tablet Take 1 tablet by mouth daily       ibrutinib (IMBRUVICA) 420 MG tablet Take 1 tablet (420 mg) by mouth daily 28 tablet 0     ibrutinib (IMBRUVICA) 420 MG tablet Take 1 tablet (420 mg) by mouth daily 28 tablet 0     ibrutinib (IMBRUVICA) 420 MG tablet Take 1 tablet (420 mg) by mouth daily 28 tablet 0     ibrutinib (IMBRUVICA) 420 MG tablet Take 1 tablet (420 mg) by mouth daily 28 tablet 0     ibrutinib (IMBRUVICA) 420 MG tablet Take 1 tablet (420 mg) by mouth daily 28 tablet 0         ALLERGIES:  Allergies   Allergen Reactions     Ace Inhibitors      cough         PAST MEDICAL  HISTORY:  Past Medical History:   Diagnosis Date     AAA (abdominal aortic aneurysm)      BCC (basal cell carcinoma of skin) - face      CLL (chronic lymphocytic leukemia)      Diaphragmatic hernia      Diverticulitis of colon      Esophageal reflux      Essential hypertension      Hyperlipidemia      Irritable bowel syndrome      Macular degeneration (senile) of retina      Squamous Cell Carcinoma, Back      Type 2 diabetes mellitus          PAST SURGICAL HISTORY:  Past Surgical History:   Procedure Laterality Date     APPENDECTOMY OPEN       BONE MARROW BIOPSY, BONE SPECIMEN, NEEDLE/TROCAR N/A 2017    Procedure: BIOPSY BONE MARROW;  BONE MARROW BIOPSY;  Surgeon: Shady Garcia MD;  Location:  GI     COLONOSCOPY       ENDOVASCULAR REPAIR ANEURYSM ABDOMINAL AORTA N/A 2017    Procedure: ENDOVASCULAR REPAIR ANEURYSM ABDOMINAL AORTA;  ENDOVASCULAR REPAIR ANEURYSM ABDOMINAL AORTA;  Surgeon: Milton Cazares MD;  Location:  OR     Fistulotomy with marsupialization for repair of fisture in ano       IR ABDOMINAL AORTOGRAM  3/11/2019     IR VISCERAL EMBOLIZATION  2019     Multiple skin tags - resection  1998     Squamous cell skin cancer resection - back           SOCIAL HISTORY:  Social History     Socioeconomic History     Marital status:      Spouse name: librado     Number of children: 0     Years of education: 17     Highest education level: Not on file   Occupational History     Occupation: retired   Social Needs     Financial resource strain: Not on file     Food insecurity     Worry: Not on file     Inability: Not on file     Transportation needs     Medical: Not on file     Non-medical: Not on file   Tobacco Use     Smoking status: Former Smoker     Packs/day: 0.50     Years: 20.00     Pack years: 10.00     Quit date: 1975     Years since quittin.4     Smokeless tobacco: Former User   Substance and Sexual Activity     Alcohol use: Yes      "Alcohol/week: 10.0 - 14.0 standard drinks     Types: 10 - 14 Standard drinks or equivalent per week     Comment: 2 drinks a day/wine     Drug use: No     Sexual activity: Never   Lifestyle     Physical activity     Days per week: Not on file     Minutes per session: Not on file     Stress: Not on file   Relationships     Social connections     Talks on phone: Not on file     Gets together: Not on file     Attends [employer] service: Not on file     Active member of club or organization: Not on file     Attends meetings of clubs or organizations: Not on file     Relationship status: Not on file     Intimate partner violence     Fear of current or ex partner: Not on file     Emotionally abused: Not on file     Physically abused: Not on file     Forced sexual activity: Not on file   Other Topics Concern     Parent/sibling w/ CABG, MI or angioplasty before 65F 55M? Not Asked   Social History Narrative     Not on file   He quit smoking in .  He drinks 1-3 glasses of wine a night with dinner.  He is retired, and used to work as a .  He lives with his wife in [place].  His cousin had lung cancer and  around age 69.  Otherwise, he denies family history of cancer.        FAMILY HISTORY:  Family History   Problem Relation Age of Onset     Cerebrovascular Disease Father         x 2     Hypertension Mother      C.A.D. Mother      Cancer Mother         skin     Eye Disorder Mother         glaucoma     Prostate Cancer No family hx of      Cancer - colorectal No family hx of      Glaucoma No family hx of      Macular Degeneration No family hx of          PHYSICAL EXAM:  BP (!) 168/90   Pulse 80   Temp 97.7  F (36.5  C) (Oral)   Resp 16   Ht 1.753 m (5' 9\")   Wt 96.9 kg (213 lb 11.2 oz)   SpO2 98%   BMI 31.56 kg/m    ECO  GENERAL/CONSTITUTIONAL: No acute distress.  EYES: No scleral icterus.  NEUROLOGIC: Alert, oriented, answers questions appropriately.  INTEGUMENTARY: No jaundice.  Scattered black dots on " palms of hands.  GAIT: Steady, does not use assistive device      LABS:  CBC RESULTS:   Recent Labs   Lab Test 06/01/21  0915   WBC 12.7*   RBC 4.22*   HGB 13.5   HCT 41.3   MCV 98   MCH 32.0   MCHC 32.7   RDW 13.0   *     Recent Labs   Lab Test 06/01/21  0915 05/04/21  0918    137   POTASSIUM 4.2 4.2   CHLORIDE 104 104   CO2 30 31   ANIONGAP 2* 2*   * 211*   BUN 26 27   CR 1.51* 1.57*   MAGDALENO 8.9 9.3     Lab Results   Component Value Date    AST 15 06/01/2021     Lab Results   Component Value Date    ALT 21 06/01/2021     Lab Results   Component Value Date    BILICONJ 0.0 04/29/2005      Lab Results   Component Value Date    BILITOTAL 0.7 06/01/2021     Lab Results   Component Value Date    ALBUMIN 3.6 06/01/2021     Lab Results   Component Value Date    PROTTOTAL 7.0 06/01/2021      Lab Results   Component Value Date    ALKPHOS 58 06/01/2021             ASSESSMENT/PLAN:  Austin Garza is a 80 year old male with:    1) CLL/SLL: BLACKWOOD stage III, with lymphocytosis, lymphadenopathy, splenomegaly, and anemia.  He has mild thrombocytopenia as well, but is above 100K.  He presented with leukocytosis with WBC of 61.7K, hemoglobin of 10.4, and platelet count of 115K on diagnosis.  Bone marrow biopsy and flow cytometry confirmed CLL/SLL.  CT scan shows mildly enlarged left axillary lymph node and periaortic lymph nodes in the retroperitoneum of the abdomen, with enlarged spleen.      Due to worsening lymphocyte count, anemia, thrombocytopenia, as well as fatigue and night sweats, he started ibrutinib on 5/4/20.      His WBC remains elevated, but has trended down nicely to 12.7.  Hemoglobin is normalized now.  Platelet count is stable.      -continue ibrutinib.  We previously discussed potential side effects, including bleeding risk.  He is not on warfarin and has no history of arrhythmias.  He is on aspirin and Plavix though.  I discussed with pharmacy.  There is no contraindication, but we will  monitor closely for bleeding.    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 He completed radiation at [facility] with planned 60 Gy x 30 fractions. He has completed radiation and noted that he did not need any more follow-up for this.  -he does have some new lesions on his scalp, and he is going to see his dermatologist again soon    3) Abdominal aortic aneurysm: measures up to 7.6 cm on CT scan, incidentally found.  He underwent EVAR on 12/4/17.  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  He is on Plavix now.  On 5/13/19, he underwent and percutaneous aneurysm sac puncture and endo leak embolization by IR on 5/13/19.  -follow-up with vascular surgery and IR    4) Diabetes, hypertension:  -following with PCP    5) Trouble sleeping, urination:  -he says that he will follow-up with his PCP for these issues    6) Black dots on his palms: unsure what theses are due to, may be ibrutinib.  His counts are stable.  -monitor for now      Breana Perry MD  Hematology/Oncology  [facility]      Total time spent on day of visit, including review of tests, obtaining/reviewing separately obtained history, ordering medications/tests/procedures, communicating with PCP/consultants, and documenting in electronic medical record: 30 minutes

## 2021-06-08 NOTE — PROGRESS NOTES
"  Oncology Rooming Note    June 8, 2021 9:19 AM   Austin Garza is a 80 year old male who presents for:    Chief Complaint   Patient presents with     Oncology Clinic Visit     CLL (chronic lymphocytic leukemia)     Initial Vitals: There were no vitals taken for this visit. Estimated body mass index is 31.75 kg/m  as calculated from the following:    Height as of 2/18/21: 1.753 m (5' 9\").    Weight as of 2/18/21: 97.5 kg (215 lb). There is no height or weight on file to calculate BSA.  Data Unavailable Comment: Data Unavailable   No LMP for male patient.  Allergies reviewed: Yes  Medications reviewed: Yes    Medications: Medication refills not needed today.  Pharmacy name entered into Mailbox:    [facility] - [place], [place] - [address]  [id number] - [place] [facility] [address] [facility]  [facility] [place] - [address]  [id number] - [place] - [address] AT [facility] - [place], [place] - [address] [facility] - [place], [place] - [address] [facility]    Clinical concerns: Has a list of concerns he would like to address       Iris Benoit MA            "

## 2021-06-08 NOTE — LETTER
"    6/8/2021         RE: Austin Garza  [address] [place] [address]        Dear Colleague,    Thank you for referring your patient, Austin Garza, to the [facility]. Please see a copy of my visit note below.      Oncology Rooming Note    June 8, 2021 9:19 AM   Austin Garza is a 80 year old male who presents for:    Chief Complaint   Patient presents with     Oncology Clinic Visit     CLL (chronic lymphocytic leukemia)     Initial Vitals: There were no vitals taken for this visit. Estimated body mass index is 31.75 kg/m  as calculated from the following:    Height as of 2/18/21: 1.753 m (5' 9\").    Weight as of 2/18/21: 97.5 kg (215 lb). There is no height or weight on file to calculate BSA.  Data Unavailable Comment: Data Unavailable   No LMP for male patient.  Allergies reviewed: Yes  Medications reviewed: Yes    Medications: Medication refills not needed today.  Pharmacy name entered into [employer]:    [facility] - [facility] [address] [id number] - [place], [place] - [address] [facility]) [facility] [place], [place] - [address]  [id number] - [place], [place] - [address] AT [facility] - [place], [place] - [address] [place] [address] [facility] - [place], [place] - [address] [facility]    Clinical concerns: Has a list of concerns he would like to address       Iris Benoit MA              [facility]    Hematology/Oncology Established Patient Note      Today's Date: 6/8/21    Reason for Follow-up: CLL      HISTORY OF PRESENT ILLNESS: Austin Garza is a 80 year old male with PMHx of DMII, HTN who presented with leukocytosis.  In November 2017, he was found to have elevated WBC of 61.7K, hemoglobin of 10.4, and platelet of 115K.  There were no other CBC results available " between 2010 and 2017.  Prior to 2010, he had normal CBC, with normal to mild anemia, and mild thrombocytopenia.   He was asymptomatic.    He underwent bone marrow biopsy on 11/21/17, which was consistent with chronic lymphocytic leukemia/small lymphocytic lymphoma, with 13% ringed sideroblasts identified.  The presence of increased numbers of ringed sideroblasts raises the possibility of an associated myelodysplastic syndrome.  Dysplastic changes are not identified though.  Many cases exhibiting ringed sideroblasts are metabolic origin, without significant risk of progression to acute leukemia, and these typically do not show dysplastic morphology as is the case with this specimen.  15% ringed sideroblasts are required for a diagnosis for RARS, which this specimen does not meet.  Flow cytometry is also consistent with CLL/SLL.  Cytogenetics show two (10%) of the metaphase cells comprise a clone characterized by a deletion within the proximal long arm of a chromosome 13 as the sole karyotypic abnormality.  Three (15%) metaphases had loss of the Y chromosome as the sole abnormality.    CT scan on 11/29/17 shows mildly enlarged left axillary lymph node and periaortic lymph nodes in the retroperitoneum of the abdomen.  Spleen is also enlarged.    On his staging CT scan for CLL, he was incidentally found to have a large infrarenal abdominal aortic aneurysm, measuring 7.6 cm.  He was seen by Dr. Thomas, and he underwent EVAR on 12/4/17.     He started ibrutinib on 5/4/20.  It was held 5/28/20-6/4/20 for tooth extraction.      INTERIM HISTORY: Austin is doing well.  He notes that he has had trouble sleeping, increased urination, and his feet feel hot at times at night.  He also developed some black dots on his palms.          REVIEW OF SYSTEMS:   14 point ROS was reviewed and is negative other than as noted above in HPI.       HOME MEDICATIONS:  Current Outpatient Medications   Medication Sig Dispense Refill     aspirin  (ASA) 81 MG tablet Take 1 tablet (81 mg) by mouth every evening (Patient taking differently: Take 325 mg by mouth daily ) 90 tablet 3     atorvastatin (LIPITOR) 20 MG tablet TAKE 1 TABLET BY MOUTH EVERYDAY AT BEDTIME 90 tablet 0     clopidogrel (PLAVIX) 75 MG tablet TAKE 1 TABLET BY MOUTH EVERY DAY 90 tablet 1     CONTOUR NEXT TEST test strip USE TO TEST BLOOD SUGAR 1-2 TIMES DAILY OR AS DIRECTED (ADJUSTING ORAL MEDICATIONS). 100 strip 1     fluocinonide (LIDEX) 0.05 % external cream Apply topically 2 times daily as needed Itchy bug bites. Use for up to 2 weeks at a time.       ibrutinib (IMBRUVICA) 420 MG tablet Take 1 tablet (420 mg) by mouth daily 28 tablet 0     losartan-hydrochlorothiazide (HYZAAR) 50-12.5 MG tablet TAKE 1 TABLET BY MOUTH EVERY DAY 90 tablet 3     metFORMIN (GLUCOPHAGE-XR) 500 MG 24 hr tablet Take 1 tablet (500 mg) by mouth daily (with dinner) 90 tablet 1     Multiple Vitamins-Minerals (CENTRUM SILVER) per tablet Take 1 tablet by mouth daily       ibrutinib (IMBRUVICA) 420 MG tablet Take 1 tablet (420 mg) by mouth daily 28 tablet 0     ibrutinib (IMBRUVICA) 420 MG tablet Take 1 tablet (420 mg) by mouth daily 28 tablet 0     ibrutinib (IMBRUVICA) 420 MG tablet Take 1 tablet (420 mg) by mouth daily 28 tablet 0     ibrutinib (IMBRUVICA) 420 MG tablet Take 1 tablet (420 mg) by mouth daily 28 tablet 0     ibrutinib (IMBRUVICA) 420 MG tablet Take 1 tablet (420 mg) by mouth daily 28 tablet 0         ALLERGIES:  Allergies   Allergen Reactions     Ace Inhibitors      cough         PAST MEDICAL HISTORY:  Past Medical History:   Diagnosis Date     AAA (abdominal aortic aneurysm)      BCC (basal cell carcinoma of skin) - face      CLL (chronic lymphocytic leukemia)      Diaphragmatic hernia      Diverticulitis of colon      Esophageal reflux      Essential hypertension      Hyperlipidemia      Irritable bowel syndrome      Macular degeneration (senile) of retina      Squamous Cell Carcinoma, Back 1988      Type 2 diabetes mellitus          PAST SURGICAL HISTORY:  Past Surgical History:   Procedure Laterality Date     APPENDECTOMY OPEN  1966     BONE MARROW BIOPSY, BONE SPECIMEN, NEEDLE/TROCAR N/A 2017    Procedure: BIOPSY BONE MARROW;  BONE MARROW BIOPSY;  Surgeon: Shady Garcia MD;  Location:  GI     COLONOSCOPY       ENDOVASCULAR REPAIR ANEURYSM ABDOMINAL AORTA N/A 2017    Procedure: ENDOVASCULAR REPAIR ANEURYSM ABDOMINAL AORTA;  ENDOVASCULAR REPAIR ANEURYSM ABDOMINAL AORTA;  Surgeon: Milton Cazares MD;  Location:  OR     Fistulotomy with marsupialization for repair of fisture in ano       IR ABDOMINAL AORTOGRAM  3/11/2019     IR VISCERAL EMBOLIZATION  2019     Multiple skin tags - resection  1998     Squamous cell skin cancer resection - back           SOCIAL HISTORY:  Social History     Socioeconomic History     Marital status:      Spouse name: librado     Number of children: 0     Years of education: 17     Highest education level: Not on file   Occupational History     Occupation: retired   Social Needs     Financial resource strain: Not on file     Food insecurity     Worry: Not on file     Inability: Not on file     Transportation needs     Medical: Not on file     Non-medical: Not on file   Tobacco Use     Smoking status: Former Smoker     Packs/day: 0.50     Years: 20.00     Pack years: 10.00     Quit date: 1975     Years since quittin.4     Smokeless tobacco: Former User   Substance and Sexual Activity     Alcohol use: Yes     Alcohol/week: 10.0 - 14.0 standard drinks     Types: 10 - 14 Standard drinks or equivalent per week     Comment: 2 drinks a day/wine     Drug use: No     Sexual activity: Never   Lifestyle     Physical activity     Days per week: Not on file     Minutes per session: Not on file     Stress: Not on file   Relationships     Social connections     Talks on phone: Not on file     Gets together: Not on file     Attends  "Orthodox service: Not on file     Active member of club or organization: Not on file     Attends meetings of clubs or organizations: Not on file     Relationship status: Not on file     Intimate partner violence     Fear of current or ex partner: Not on file     Emotionally abused: Not on file     Physically abused: Not on file     Forced sexual activity: Not on file   Other Topics Concern     Parent/sibling w/ CABG, MI or angioplasty before 65F 55M? Not Asked   Social History Narrative     Not on file   He quit smoking in .  He drinks 1-3 glasses of wine a night with dinner.  He is retired, and used to work as a .  He lives with his wife in [place].  His cousin had lung cancer and  around age 69.  Otherwise, he denies family history of cancer.        FAMILY HISTORY:  Family History   Problem Relation Age of Onset     Cerebrovascular Disease Father         x 2     Hypertension Mother      C.A.D. Mother      Cancer Mother         skin     Eye Disorder Mother         glaucoma     Prostate Cancer No family hx of      Cancer - colorectal No family hx of      Glaucoma No family hx of      Macular Degeneration No family hx of          PHYSICAL EXAM:  BP (!) 168/90   Pulse 80   Temp 97.7  F (36.5  C) (Oral)   Resp 16   Ht 1.753 m (5' 9\")   Wt 96.9 kg (213 lb 11.2 oz)   SpO2 98%   BMI 31.56 kg/m    ECO  GENERAL/CONSTITUTIONAL: No acute distress.  EYES: No scleral icterus.  NEUROLOGIC: Alert, oriented, answers questions appropriately.  INTEGUMENTARY: No jaundice.  Scattered black dots on palms of hands.  GAIT: Steady, does not use assistive device      LABS:  CBC RESULTS:   Recent Labs   Lab Test 21  0915   WBC 12.7*   RBC 4.22*   HGB 13.5   HCT 41.3   MCV 98   MCH 32.0   MCHC 32.7   RDW 13.0   *     Recent Labs   Lab Test 21  0915 21  0918    137   POTASSIUM 4.2 4.2   CHLORIDE 104 104   CO2 30 31   ANIONGAP 2* 2*   * 211*   BUN 26 27   CR 1.51* 1.57*   MAGDALENO 8.9 " 9.3     Lab Results   Component Value Date    AST 15 06/01/2021     Lab Results   Component Value Date    ALT 21 06/01/2021     Lab Results   Component Value Date    BILICONJ 0.0 04/29/2005      Lab Results   Component Value Date    BILITOTAL 0.7 06/01/2021     Lab Results   Component Value Date    ALBUMIN 3.6 06/01/2021     Lab Results   Component Value Date    PROTTOTAL 7.0 06/01/2021      Lab Results   Component Value Date    ALKPHOS 58 06/01/2021             ASSESSMENT/PLAN:  Austin Garza is a 80 year old male with:    1) CLL/SLL: BLACKWOOD stage III, with lymphocytosis, lymphadenopathy, splenomegaly, and anemia.  He has mild thrombocytopenia as well, but is above 100K.  He presented with leukocytosis with WBC of 61.7K, hemoglobin of 10.4, and platelet count of 115K on diagnosis.  Bone marrow biopsy and flow cytometry confirmed CLL/SLL.  CT scan shows mildly enlarged left axillary lymph node and periaortic lymph nodes in the retroperitoneum of the abdomen, with enlarged spleen.      Due to worsening lymphocyte count, anemia, thrombocytopenia, as well as fatigue and night sweats, he started ibrutinib on 5/4/20.      His WBC remains elevated, but has trended down nicely to 12.7.  Hemoglobin is normalized now.  Platelet count is stable.      -continue ibrutinib.  We previously discussed potential side effects, including bleeding risk.  He is not on warfarin and has no history of arrhythmias.  He is on aspirin and Plavix though.  I discussed with pharmacy.  There is no contraindication, but we will monitor closely for bleeding.    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 He completed radiation at [facility] with planned 60 Gy x 30 fractions. He has completed radiation and noted that he did not need any  more follow-up for this.  -he does have some new lesions on his scalp, and he is going to see his dermatologist again soon    3) Abdominal aortic aneurysm: measures up to 7.6 cm on CT scan, incidentally found.  He underwent EVAR on 12/4/17.  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  He is on Plavix now.  On 5/13/19, he underwent and percutaneous aneurysm sac puncture and endo leak embolization by IR on 5/13/19.  -follow-up with vascular surgery and IR    4) Diabetes, hypertension:  -following with PCP    5) Trouble sleeping, urination:  -he says that he will follow-up with his PCP for these issues    6) Black dots on his palms: unsure what theses are due to, may be ibrutinib.  His counts are stable.  -monitor for now      Breana Perry MD  Hematology/Oncology  [facility]      Total time spent on day of visit, including review of tests, obtaining/reviewing separately obtained history, ordering medications/tests/procedures, communicating with PCP/consultants, and documenting in electronic medical record: 30 minutes      Again, thank you for allowing me to participate in the care of your patient.        Sincerely,        Breana Perry MD

## 2021-06-10 DIAGNOSIS — I77.70 ARTERY DISSECTION (H): ICD-10-CM

## 2021-06-10 RX ORDER — CLOPIDOGREL BISULFATE 75 MG/1
75 TABLET ORAL DAILY
Qty: 90 TABLET | Refills: 1 | Status: ON HOLD | OUTPATIENT
Start: 2021-06-10 | End: 2021-08-02

## 2021-06-10 NOTE — PATIENT INSTRUCTIONS
Monthly labs scheduled 7/13, 8/10, 9/2  Appt with Dr. Perry scheduled 9/7  Tosha Burris RN on 6/10/2021 at 10:49 AM

## 2021-06-10 NOTE — TELEPHONE ENCOUNTER
Routing refill request to provider for review/approval because:  Labs out of range:    Lab Test 06/01/21  0915   *     Lolita Chadwick, JAMESONN, RN  [facility]

## 2021-06-22 ENCOUNTER — OFFICE VISIT (OUTPATIENT)
Dept: PEDIATRICS | Facility: CLINIC | Age: 81
End: 2021-06-22
Payer: COMMERCIAL

## 2021-06-22 VITALS
TEMPERATURE: 97.4 F | BODY MASS INDEX: 31.75 KG/M2 | HEART RATE: 90 BPM | OXYGEN SATURATION: 99 % | DIASTOLIC BLOOD PRESSURE: 87 MMHG | SYSTOLIC BLOOD PRESSURE: 161 MMHG | WEIGHT: 215 LBS

## 2021-06-22 DIAGNOSIS — I10 HYPERTENSION GOAL BP (BLOOD PRESSURE) < 140/90: ICD-10-CM

## 2021-06-22 DIAGNOSIS — E11.649 TYPE 2 DIABETES MELLITUS WITH HYPOGLYCEMIA WITHOUT COMA, WITHOUT LONG-TERM CURRENT USE OF INSULIN (H): Primary | ICD-10-CM

## 2021-06-22 DIAGNOSIS — N18.31 STAGE 3A CHRONIC KIDNEY DISEASE (H): ICD-10-CM

## 2021-06-22 DIAGNOSIS — E78.5 HYPERLIPIDEMIA LDL GOAL <100: ICD-10-CM

## 2021-06-22 DIAGNOSIS — E66.01 MORBIDLY OBESE (H): ICD-10-CM

## 2021-06-22 PROCEDURE — 99214 OFFICE O/P EST MOD 30 MIN: CPT | Performed by: NURSE PRACTITIONER

## 2021-06-22 RX ORDER — METFORMIN HCL 500 MG
1000 TABLET, EXTENDED RELEASE 24 HR ORAL
Qty: 60 TABLET | Refills: 0 | Status: SHIPPED | OUTPATIENT
Start: 2021-06-22 | End: 2021-07-12

## 2021-06-22 NOTE — PROGRESS NOTES
Assessment & Plan     Type 2 diabetes mellitus with hypoglycemia without coma, without long-term current use of insulin (H)  Having episodes of asymptomatic hyperglycemia the past few weeks. Most recent A1c 5.9%. Had previously been on metformin 1000 mg and glimepiride but had hypoglycemia with hospitalization for this.   Plan to increase metformin dose and f/u with [facility] for med management.   - metFORMIN (GLUCOPHAGE-XR) 500 MG 24 hr tablet; Take 2 tablets (1,000 mg) by mouth daily (with dinner)  - MED THERAPY MANAGE REFERRAL    Hypertension goal BP (blood pressure) < 140/90  BP above goal past 2 visits. Does not regularly check BPs at home. Tells me he thinks he has white coat syndrome. Recommend monitor at home and can follow-up with [facility].  - MED THERAPY MANAGE REFERRAL    Stage 3a chronic kidney disease  - MED THERAPY MANAGE REFERRAL    Hyperlipidemia LDL goal <100  - MED THERAPY MANAGE REFERRAL    Overweight - BMI >35  - MED THERAPY MANAGE REFERRAL    Patient Instructions   Increase your metformin  Keep checking your blood sugars at least daily  Follow-up with the [facility] pharmacist in the next 1-2 months  Follow-up Dr. Munson for your other concerns  Check your blood pressure at home      Return in about 1 month (around 7/22/2021).    Cristina Nunez NP  [facility] [place] is a 80 year old who presents for the following health issues     HPI     Diabetes Follow-up    How often are you checking your blood sugar? Not at all, yet when patient isnt feeling well is when he check it.     What concerns do you have today about your diabetes? Blood sugar is often over 200 and unstable blood sugars, balance issues since 12/2020     Do you have any of these symptoms? (Select all that apply)  Numbness in feet      BP Readings from Last 2 Encounters:   06/22/21 (!) 161/87   06/08/21 (!) 168/90     Hemoglobin A1C (%)   Date Value   04/05/2021 5.9 (H)   01/15/2021 6.7 (H)     LDL Cholesterol  Calculated (mg/dL)   Date Value   04/05/2021 96   01/15/2021 27     Last visit 2/18/21, recommended restart metformin 500 mg.  Previously had been hospitalized for hypoglycemia (previously was on Metformin 1,000 mg and glimepiride) and meds were modified/stopped for about 1 month.  Had recommended stop Statin and recheck labs in 3 mos, labs looked good.  Does not regularly check BP at home, thinks he gets nervous here.   Taking meds as prescribed.    Having fasting blood sugar readings 120s-140s, also had x2 readings of 176 the past 2 weeks so he has taken 2 tabs of metformin the past week.  Denies weight gain, change in activity, or dietary changes.    Review of Systems   Constitutional, HEENT, cardiovascular, pulmonary, GI, , musculoskeletal, neuro, skin, endocrine and psych systems are negative, except as otherwise noted.      Objective    BP (!) 161/87 (BP Location: Right arm, Cuff Size: Adult Large)   Pulse 90   Temp 97.4  F (36.3  C) (Tympanic)   Wt 97.5 kg (215 lb)   SpO2 99%   BMI 31.75 kg/m    Body mass index is 31.75 kg/m .  Physical Exam   GENERAL: healthy, alert and no distress  PSYCH: mentation appears normal, affect normal/bright    No results found for this or any previous visit (from the past 24 hour(s)).

## 2021-06-22 NOTE — PATIENT INSTRUCTIONS
Increase your metformin  Keep checking your blood sugars at least daily  Follow-up with the [facility] pharmacist in the next 1-2 months  Follow-up Dr. Munson for your other concerns  Check your blood pressure at home

## 2021-07-09 NOTE — PROGRESS NOTES
Medication Therapy Management (MTM) Encounter    ASSESSMENT:                            Medication Adherence/Access: No issues identified    Type 2 Diabetes:  Patient is meeting A1c goal of < 7-7.5%. Limited SMBG readings, therefore suggest no change today. Ct to monitor.     AAA: patient is taking higher than recommended aspirin dose.    Hypertension/CKD: blood pressure not at goal of < 140/90 mm Hg. Given question of white coat, will have patient check at home until his follow-up with provider in a few days. Would consider the following option titration with losartan or add CCB. In the setting of CKD and pill burden, could consider further titration in ARB with bmp recheck 2-3 weeks later.     Hyperlipidemia: patient should restart statin therapy. Atorvastatin more favorable in CKD, however ecrcl > 30 mL/min and potential for drug interaction with clopidogrel. Would recommend we start low dose rosuvastatin (lowered potency due to potential for higher rosuvastatin in the setting of clopidogrel concomitant therapy).    CLL: ct with heme/onc recs and monitoring. ibrutinib maybe contributing to HTN. Encouraged patient to follow-up with provider on his memory concerns.    Hx of elevated PSA: has upcoming provider, recommend discussing if should recheck PSA level. Seems unlikely to be hyperglycemia based on patient's symptoms.    Vaccines: Candidate for sShingrix.    PLAN:                            HTN:  1. Notes of possible white, however patient denies this. I will have patient check home blood pressure until visit on 7/15 with Dr. Munson. If at recheck blood pressure is > 140/90, please consider titration in losartan to 100 mg dose + bmp recheck in 2 weeks.     AAA:  1. Ct Plavix, however patient to reduce aspirin from 325mg to 81 mg. - verified in the charts per vascular notes, patient may have confused directions as he was instructed to be on 325mg prior to addition of Plavix. Called and discussed with patient it,  he will reduce to 81 mg once daily.    DM:  1. Recommend patient to check fasting blood sugar in the MORNING.  2. A1c level tomorrow with his labs for Imbruvica.  3. May take metformin , 2 tablets in the morning instead of twice daily. No change in dose but refilled.  4. Microalbuminuria future lab ordered.    Lipids:  1. Start rosuvastatin 5 mg daily  2. FLP in 3 months.     Hx of elevated PSA:  1. Defer to Dr. Munson if PSA level should also be checked based on recent symptoms (see subjective section).    Follow-up: Return in about 3 months (around 10/12/2021) for Medication Therapy Management Visit. *patient request I call as it gets closer.      SUBJECTIVE/OBJECTIVE:                          Austin Garza is a 80 year old male coming in for an initial visit. He was referred to me from Cristina Nunez NP. Referred for DM follow-up.     Reason for visit: reports metformin was working well up until a few months ago. Admits was not monitoring sugars much.     Allergies/ADRs: Reviewed in chart  Tobacco: He reports that he quit smoking about 46 years ago. He has a 10.00 pack-year smoking history. He has quit using smokeless tobacco.  Alcohol: couple glasses of cocktail and wine with dinner. [place] cut back now. 4-5 times a week.   Caffeine: 1-1 cup of coffee, caffeine-free diet pop usually.   Activity: bikes and swims at the [facility]  Past Medical History: Reviewed in chart - hearing loss, AAA, artery disection, basal cell carcinoma - face, anemia, CLL, elevated PSA.    Medication Adherence/Access: presents to visit with pillbox and able to name off medications to me.  Patient uses pill box(es), sets up himself.  Patient takes medications 2 time(s) per day.   The patient fills medications at [facility]: NO, fills medications at [facility].    Type 2 Diabetes:  Currently taking metformin  mg, takes 1 tablet twice daily (started on ~6/19/21 due to hyperglycemia episodes). He is wondering if metformin can be take  "together at one time. Previously hospitalize for hypoglycemia while on metformin and glimepiride.   Patient is not experiencing side effects.  Blood sugar monitoring: tends to avoid checking in the morning because seems to always runs higher. He reports his lowest readings are at ~11am (after lunch and going to the [facility]). Checks 3 times a week. Sometimes will check before supper at 4 pm. Has not had any readings in the 200's but admits not checking very frequently.  Eye exam: up to date  Foot exam: up to date  Aspirin: Taking 325mg daily and has the following issues: bruising, stomach upset and small black dots on skin note by heme/onc prior too.  Statin: No, see below.  ACEi/ARB: Yes: losartan-hydrochlorothiazide .   Urine Albumin:   Lab Results   Component Value Date    UMALCR 31.92 (H) 12/17/2019      Lab Results   Component Value Date    A1C 5.9 04/05/2021    A1C 6.7 01/15/2021    A1C 5.6 08/23/2020    A1C 5.5 07/14/2020    A1C 6.3 12/31/2019     TSH   Date Value Ref Range Status   12/31/2019 3.57 0.40 - 4.00 mU/L Final     AAA (repair in 2017): patient is taking clopidogrel 75 mg daily and aspirin 325 mg once daily. He reports he was recommend by vascular provider (from chart was following Dr. Cazares) to take 325 mg. However, this is a discrepancy from current chart recommendations. He is reporting frequent bruises and new onset of \"black dots\" on his palms and feet. No other signs of blood.   From chart review, patient was on aspirin 325 mg per vascular but with recurrent dissection in 2019 was started on clopidogrel and recommended to reduce aspirin to 81 mg daily.     Hypertension/CKD: Does not regularly check BPs at home. Previous OV had mentioned that patient thought he might have white coat syndrome. However, today he tells he is calm and denies white coat syndrome like symptoms.  Current medications include losartan 50-hydrochlorothiazide 12.5 mg in the morning.   Patient reports the following medication " "side effects: sometimes seems off balance, but only report one incidence of very severe dizziness back in [place], when he bent down. Denies any reoccurrence since.  BP Readings from Last 3 Encounters:   07/12/21 (!) 146/82   06/22/21 (!) 161/87   06/08/21 (!) 168/90   54.421mL/min (sCr 1.51)    Hyperlipidemia: Current therapy includes no current medications. He is suppose to be on atorvastatin 20mg daily. He states now that I mention it he does recall seeing an empty bottle for that medication in the past. The Rx was send in January 2021 for 90 days supply with no refills.  Patient reports no significant myalgias or other side effects while on the atorvastatin.  Recent Labs   Lab Test 04/05/21  0913 01/15/21  1527 10/23/15  0903 10/23/15  0903 08/05/14  0755   CHOL 166 109   < > 109 131   HDL 57 51   < > 43 47   LDL 96 27   < > 54 63   TRIG 64 157*   < > 62 105   CHOLHDLRATIO  --   --   --  2.5 2.8    < > = values in this interval not displayed.     CLL: underwent bone marrow biopsy on 11/21/17. met with onc on 6/8/21 Dr. Breana Perry. Onc notes of ibrutinib started on 5/4/20. Black dots on his palms: unsure what theses are due to, may be ibrutinib.   Notes memory declining a little more noticeable this past year. He also showed me the black dots on his hands.  He also takes a MV once daily for general health.    Hx of elevated PSA: reports frequency unchanged but the stream is not always strong may \"dribble\". He often will wake up with feels of \"hot\" feet and urge to urinate.   PSA   Date Value Ref Range Status   12/31/2019 5.49 (H) 0 - 4 ug/L Final     Comment:     Assay Method:  Chemiluminescence using Siemens Vista analyzer     Vaccines:  Most Recent Immunizations   Administered Date(s) Administered     COVID-19,PF,Pfizer 03/24/2021     DT (PEDS <7y) 10/07/1996     Flu, Unspecified 10/21/1998     HepB-Adult 12/31/1992     Influenza (High Dose) 3 valent vaccine 10/01/2019     Influenza (IIV3) PF 11/03/2010 "     Influenza, Quad, High Dose, Pf, 65yr + 09/30/2020     Pneumo Conj 13-V (2010&after) 06/26/2015     Pneumococcal 23 valent 07/16/2010     TD (ADULT, 7+) 10/05/2012     Td (Adult), Adsorbed 10/05/2012     Tetanus 07/31/2001     Zoster vaccine, live 08/12/2010           Today's Vitals: BP (!) 146/82   Pulse 82   Wt 213 lb 11.2 oz (96.9 kg)   SpO2 98%   BMI 31.56 kg/m    ----------------    I spent 60 minutes with this patient today (an extra 15 minutes was spent creating the Medication Action Plan). All changes were made via collaborative practice agreement with Vanessa Munson. A copy of the visit note was provided to the patient's provider.    The patient was given a summary of these recommendations.     Klaudia Santa, PharmD  Medication Therapy Management Pharmacist  Pager#: [phone]          Medication Therapy Recommendations  Artery dissection (H)    Current Medication: aspirin 81 MG EC tablet   Rationale: Dose too high - Dosage too high - Safety   Recommendation: Decrease Dose - aspirin 325 MG EC tablet - reduce to 81 mg qd.   Status: Patient Agreed - Adherence/Education         Hyperlipidemia LDL goal <100    Current Medication: rosuvastatin (CRESTOR) 5 MG tablet   Rationale: Untreated condition - Needs additional medication therapy - Indication   Recommendation: Start Medication   Status: Accepted per [facility]         Hypertension goal BP (blood pressure) < 140/90    Current Medication: losartan-hydrochlorothiazide (HYZAAR) 50-12.5 MG tablet   Rationale: Dose too low - Dosage too low - Effectiveness   Recommendation: Self-Monitoring - if home bp and clinic bp at next OV elevate, increase losartan to 100 mg and continue hctz 12.5mg once daily.   Status: Patient Agreed - Adherence/Education         Vaccine counseling    Rationale: Preventive therapy - Needs additional medication therapy - Indication   Recommendation: Provide Education - go to pharmacy get Shingrix   Status: Patient Agreed -  Adherence/Education

## 2021-07-12 ENCOUNTER — OFFICE VISIT (OUTPATIENT)
Dept: PHARMACY | Facility: CLINIC | Age: 81
End: 2021-07-12
Payer: COMMERCIAL

## 2021-07-12 VITALS
HEART RATE: 82 BPM | DIASTOLIC BLOOD PRESSURE: 82 MMHG | OXYGEN SATURATION: 98 % | BODY MASS INDEX: 31.56 KG/M2 | WEIGHT: 213.7 LBS | SYSTOLIC BLOOD PRESSURE: 146 MMHG

## 2021-07-12 DIAGNOSIS — I10 HYPERTENSION GOAL BP (BLOOD PRESSURE) < 140/90: ICD-10-CM

## 2021-07-12 DIAGNOSIS — E11.649 TYPE 2 DIABETES MELLITUS WITH HYPOGLYCEMIA WITHOUT COMA, WITHOUT LONG-TERM CURRENT USE OF INSULIN (H): Primary | ICD-10-CM

## 2021-07-12 DIAGNOSIS — R97.20 ELEVATED PROSTATE SPECIFIC ANTIGEN (PSA): ICD-10-CM

## 2021-07-12 DIAGNOSIS — I77.70 ARTERY DISSECTION (H): ICD-10-CM

## 2021-07-12 DIAGNOSIS — N18.31 STAGE 3A CHRONIC KIDNEY DISEASE (H): ICD-10-CM

## 2021-07-12 DIAGNOSIS — E78.5 HYPERLIPIDEMIA LDL GOAL <100: ICD-10-CM

## 2021-07-12 DIAGNOSIS — Z71.85 VACCINE COUNSELING: ICD-10-CM

## 2021-07-12 DIAGNOSIS — C91.10 CLL (CHRONIC LYMPHOCYTIC LEUKEMIA) (H): ICD-10-CM

## 2021-07-12 PROCEDURE — 99607 MTMS BY PHARM ADDL 15 MIN: CPT | Performed by: PHARMACIST

## 2021-07-12 PROCEDURE — 99605 MTMS BY PHARM NP 15 MIN: CPT | Performed by: PHARMACIST

## 2021-07-12 RX ORDER — ROSUVASTATIN CALCIUM 5 MG/1
5 TABLET, COATED ORAL DAILY
Qty: 90 TABLET | Refills: 1 | Status: ON HOLD | OUTPATIENT
Start: 2021-07-12 | End: 2021-08-02

## 2021-07-12 RX ORDER — METFORMIN HCL 500 MG
1000 TABLET, EXTENDED RELEASE 24 HR ORAL EVERY MORNING
Qty: 180 TABLET | Refills: 1 | Status: SHIPPED | OUTPATIENT
Start: 2021-07-12 | End: 2021-09-03

## 2021-07-12 RX ORDER — ASPIRIN 81 MG/1
81 TABLET ORAL EVERY EVENING
Status: ON HOLD | COMMUNITY
End: 2021-07-30 | Stop reason: ALTCHOICE

## 2021-07-12 NOTE — PATIENT INSTRUCTIONS
Recommendations from today's MTM visit:                                                      1. Continue aspirin for now, but I will talk with the vascular team as I think you should be on the lower aspirin 81 mg dose.     2. Please go to the pharmacy to get the Shingrix vaccine, which helps prevent the risk of getting shingles and the nerve pain if you have shingles. After the first shot, you will need the second shot to complete the series 2-6 months later.     3. Start rosuvastatin 5 mg once a day - may be taken at night or morning. This medication is to lower your cholesterol and prevent future clots.     4. Labs ordered, you should be able to get the A1c checked tomorrow.     5. Please check your blood pressure at home once a day - be sure when you check it is  by 1 hour from food or anything caffeinated.     Follow-up: Return in about 3 months (around 10/12/2021) for Medication Therapy Management Visit.    It was great to speak with you today.  I value your experience and would be very thankful for your time with providing feedback on our clinic survey. You may receive a survey via email or text message in the next few days.     To schedule another MTM appointment, please call the clinic directly or you may call the MTM scheduling line at [phone] or toll-free at [phone].     My Clinical Pharmacist's contact information:                                                      Please feel free to contact me with any questions or concerns you have.      Klaudia Santa, PharmD  Medication Therapy Management Pharmacist  Pager#: [phone]

## 2021-07-12 NOTE — Clinical Note
Hello, you have a visit on 7/15, please see my plan specifically HTN considerations (he denied white coat, hopefully will have some home bps for you, I wonder if med for his CLL is contributing to HTN as that can be common). I am routing to you as patient mentioned he did not officially switch to you but likes to follow-up with you moving forward. Thank you!

## 2021-07-12 NOTE — LETTER
"        Date: 2021    Austin Garza  [address] [place] [address]    Dear Mr. Garza,    Thank you for talking with me on 2021 about your health and medications. Medicare s MTM (Medication Therapy Management) program helps you understand your medications and use them safely.      This letter includes an action plan (Medication Action Plan) and medication list (Personal Medication List). The action plan has steps you should take to help you get the best results from your medications. The medication list will help you keep track of your medications and how to use them the right way.       Have your action plan and medication list with you when you talk with your doctors, pharmacists, and other healthcare providers in your care team.     Ask your doctors, pharmacists, and other healthcare providers to update the action plan and medication list at every visit.     Take your medication list with you if you go to the hospital or emergency room.     Give a copy of the action plan and medication list to your family or caregivers.     If you want to talk about this letter or any of the papers with it, please call   [phone].We look forward to working with you, your doctors, and other healthcare providers to help you stay healthy through the [employer] [place] MTM program.    Sincerely,  Klaudia Santa RPH    Enclosed: Medication Action Plan and Personal Medication List    MEDICATION ACTION PLAN FOR Austin Garza,  1940     This action plan will help you get the best results from your medications if you:   1. Read \"What we talked about.\"   2. Take the steps listed in the \"What I need to do\" boxes.   3. Fill in \"What I did and when I did it.\"   4. Fill in \"My follow-up plan\" and \"Questions I want to ask.\"     Have this action plan with you when you talk with your doctors, pharmacists, and other healthcare providers in your care team. Share this with your family or " caregivers too.  DATE PREPARED: 2021  What we talked about: check home blood pressure until visit on 7/15 with Dr. Munson. If at recheck blood pressure is > 140/90, Dr. Munson will discuss next steps/medication changes.                                              What I need to do: continue taking medications as directed. However, start checking blood pressure at home once a day. Bring readings to visit on 7/15 with Dr. Munson. What I did and when I did it:                                              What we talked about: Recommend you get the updated shingles vaccine called Shingrix.                                            What I need to do: go to the pharmacy and ask about Shingrix vaccine. After the first dose, 2-6 months need to get second shot to complete the series.  What I did and when I did it:                                                   What we talked about: for clot prevention, you are taking higher than recommended dose of aspirin.                                                 What I need to do: decrease aspirin to 81 mg once daily. Stop aspirin 325 mg. What I did and when I did it:                                               What we talked about: for cholesterol lowering and your heart health, please restart the cholesterol medication. We will start rosuvastatin.                                              What I need to do: start rosuvastatin 5 mg/1 tablet once daily.   What I did and when I did it:                                                     My follow-up plan:                 Questions I want to ask:              If you have any questions about your action plan, call Klaudia Santa RPH, Phone: [phone] , Monday-Friday 8-4:30pm.           PERSONAL MEDICATION LIST FOR Austin Garza, DAVID 1940     This medication list was made for you after we talked. We also used information from your doctor's chart.      Use blank rows to add new medications. Then fill in the dates  you started using them.    Cross out medications when you no longer use them. Then write the date and why you stopped using them.    Ask your doctors, pharmacists, and other healthcare providers to update this list at every visit. Keep this list up-to-date with:       Prescription medications    Over the counter drugs     Herbals    Vitamins    Minerals      If you go to the hospital or emergency room, take this list with you. Share this with your family or caregivers too.     DATE PREPARED: 7/13/2021  Allergies or side effects: Ace inhibitors     Medication:  ASPIRIN EC 81 MG PO TBEC      How I use it:  Take 81 mg by mouth daily       Why I use it:  for clot prevention    Prescriber:  Vanessa Munson MD      Date I started using it:       Date I stopped using it:         Why I stopped using it:            Medication:  CENTRUM SILVER PO TABS      How I use it:  Take 1 tablet by mouth daily      Why I use it:  general health    Prescriber:  Vanessa Munson MD      Date I started using it:       Date I stopped using it:         Why I stopped using it:            Medication:  CLOPIDOGREL BISULFATE 75 MG PO TABS      How I use it:  Take 1 tablet (75 mg) by mouth daily      Why I use it: Clot prevention    Prescriber:  Vanessa Munson MD      Date I started using it:       Date I stopped using it:         Why I stopped using it:              Medication:  IBRUTINIB 420 MG PO TABS      How I use it:  Take 1 tablet (420 mg) by mouth daily      Why I use it: CLL (chronic lymphocytic leukemia)     Prescriber:  Breana Perry MD      Date I started using it:       Date I stopped using it:         Why I stopped using it:            Medication:  LOSARTAN POTASSIUM-HCTZ 50-12.5 MG PO TABS      How I use it:  TAKE 1 TABLET BY MOUTH EVERY DAY      Why I use it: blood pressure control    Prescriber:  Oral Fisher PA-C      Date I started using it:       Date I stopped using it:         Why I stopped using it:             Medication:  METFORMIN HCL  MG PO TB24      How I use it:  Take 2 tablets (1,000 mg) by mouth every morning      Why I use it: Type 2 diabetes mellitus     Prescriber:  Vanessa Munson MD      Date I started using it:       Date I stopped using it:         Why I stopped using it:            Medication:  ROSUVASTATIN CALCIUM 5 MG PO TABS      How I use it:  Take 1 tablet (5 mg) by mouth daily      Why I use it: Hyperlipidemia     Prescriber:  Vanessa Munson MD      Date I started using it:       Date I stopped using it:         Why I stopped using it:            Medication:         How I use it:         Why I use it:      Prescriber:         Date I started using it:       Date I stopped using it:         Why I stopped using it:            Medication:         How I use it:         Why I use it:      Prescriber:         Date I started using it:       Date I stopped using it:         Why I stopped using it:            Medication:         How I use it:         Why I use it:      Prescriber:         Date I started using it:       Date I stopped using it:         Why I stopped using it:              Other Information:     If you have any questions about your medication list, call Klaudia Santa WILSON, Phone: [phone] , Monday-Friday 8-4:30pm.    According to the Paperwork Reduction Act of 1995, no persons are required to respond to a collection of information unless it displays a valid OMB control number. The valid OMB number for this information collection is 6906-1004. The time required to complete this information collection is estimated to average 40 minutes per response, including the time to review instructions, searching existing data resources, gather the data needed, and complete and review the information collection. If you have any comments concerning the accuracy of the time estimate(s) or suggestions for improving this form, please write to: CMS, Attn: SHONNA Reports Clearance Officer, [id number], [place] [address].

## 2021-07-13 ENCOUNTER — TELEPHONE (OUTPATIENT)
Dept: ONCOLOGY | Facility: CLINIC | Age: 81
End: 2021-07-13

## 2021-07-13 ENCOUNTER — LAB (OUTPATIENT)
Dept: ONCOLOGY | Facility: CLINIC | Age: 81
End: 2021-07-13
Attending: INTERNAL MEDICINE
Payer: COMMERCIAL

## 2021-07-13 ENCOUNTER — HOSPITAL ENCOUNTER (OUTPATIENT)
Facility: CLINIC | Age: 81
Discharge: HOME | End: 2021-07-13
Attending: INTERNAL MEDICINE | Admitting: INTERNAL MEDICINE
Payer: COMMERCIAL

## 2021-07-13 DIAGNOSIS — C91.10 CLL (CHRONIC LYMPHOCYTIC LEUKEMIA) (H): ICD-10-CM

## 2021-07-13 LAB
ALBUMIN SERPL-MCNC: 3.7 G/DL (ref 3.4–5)
ALP SERPL-CCNC: 49 U/L (ref 40–150)
ALT SERPL W P-5'-P-CCNC: 19 U/L (ref 0–70)
ANION GAP SERPL CALCULATED.3IONS-SCNC: 5 MMOL/L (ref 3–14)
AST SERPL W P-5'-P-CCNC: 14 U/L (ref 0–45)
BASOPHILS # BLD MANUAL: 0 10E3/UL (ref 0–0.2)
BASOPHILS NFR BLD MANUAL: 0 %
BILIRUB SERPL-MCNC: 0.8 MG/DL (ref 0.2–1.3)
BUN SERPL-MCNC: 24 MG/DL (ref 7–30)
CALCIUM SERPL-MCNC: 9.6 MG/DL (ref 8.5–10.1)
CHLORIDE BLD-SCNC: 102 MMOL/L (ref 94–109)
CO2 SERPL-SCNC: 29 MMOL/L (ref 20–32)
CREAT SERPL-MCNC: 1.54 MG/DL (ref 0.66–1.25)
EOSINOPHIL # BLD MANUAL: 0 10E3/UL (ref 0–0.7)
EOSINOPHIL NFR BLD MANUAL: 0 %
ERYTHROCYTE [DISTWIDTH] IN BLOOD BY AUTOMATED COUNT: 13.4 % (ref 10–15)
GFR SERPL CREATININE-BSD FRML MDRD: 43 ML/MIN/1.73M2
GLUCOSE BLD-MCNC: 230 MG/DL (ref 70–99)
HCT VFR BLD AUTO: 40.4 % (ref 40–53)
HGB BLD-MCNC: 13.1 G/DL (ref 13.3–17.7)
LDH SERPL L TO P-CCNC: 196 U/L (ref 85–227)
LYMPHOCYTES # BLD MANUAL: 10.8 10E3/UL (ref 0.8–5.3)
LYMPHOCYTES NFR BLD MANUAL: 82 %
MCH RBC QN AUTO: 32 PG (ref 26.5–33)
MCHC RBC AUTO-ENTMCNC: 32.4 G/DL (ref 31.5–36.5)
MCV RBC AUTO: 99 FL (ref 78–100)
MONOCYTES # BLD MANUAL: 0.4 10E3/UL (ref 0–1.3)
MONOCYTES NFR BLD MANUAL: 3 %
NEUTROPHILS # BLD MANUAL: 2 10E3/UL (ref 1.6–8.3)
NEUTROPHILS NFR BLD MANUAL: 15 %
PLAT MORPH BLD: ABNORMAL
PLATELET # BLD AUTO: 109 10E3/UL (ref 150–450)
POTASSIUM BLD-SCNC: 4.3 MMOL/L (ref 3.4–5.3)
PROT SERPL-MCNC: 7 G/DL (ref 6.8–8.8)
RBC # BLD AUTO: 4.1 10E6/UL (ref 4.4–5.9)
RBC MORPH BLD: ABNORMAL
SODIUM SERPL-SCNC: 136 MMOL/L (ref 133–144)
WBC # BLD AUTO: 13.2 10E3/UL (ref 4–11)

## 2021-07-13 PROCEDURE — 85027 COMPLETE CBC AUTOMATED: CPT

## 2021-07-13 PROCEDURE — 80053 COMPREHEN METABOLIC PANEL: CPT

## 2021-07-13 PROCEDURE — 36415 COLL VENOUS BLD VENIPUNCTURE: CPT

## 2021-07-13 PROCEDURE — 83615 LACTATE (LD) (LDH) ENZYME: CPT

## 2021-07-13 NOTE — ORAL ONC MGMT
Oral Chemotherapy Monitoring Program    Subjective/Objective:  Austin Garza is a 80 year old male contacted by phone for a follow-up visit for oral chemotherapy.  Benji's only side effect is grade 1 fatigue.  He does have tiny black spots on the palms of hands and the soles of his feet.      ORAL CHEMOTHERAPY 5/21/2020 6/10/2020 11/3/2020 11/4/2020 12/8/2020 2/8/2021 7/13/2021   Assessment Type - - Refill Refill Monthly Follow up Refill Lab Monitoring   Diagnosis Code - - Chronic Lymphocytic Leukemia (CLL) Chronic Lymphocytic Leukemia (CLL) Chronic Lymphocytic Leukemia (CLL) Chronic Lymphocytic Leukemia (CLL) Chronic Lymphocytic Leukemia (CLL)   Providers - - Dr. Orlando Perry   Clinic Name/Location - - [facility]   Drug Name Imbruvica (Ibrutinib) Imbruvica (Ibrutinib) Imbruvica (Ibrutinib) - Imbruvica (Ibrutinib) Imbruvica (Ibrutinib) Imbruvica (ibrutinib)   Dose - - 420 mg - 420 mg 420 mg 420 mg   Current Schedule Daily Daily Daily - Daily Daily Daily   Cycle Details Continuous Continuous Continuous - Continuous Continuous Continuous   Start Date of Last Cycle - - - - - - -   Planned next cycle start date - - - - 12/14/2020 - -   Doses missed in last 2 weeks 0 more - - 0 - 1   Adherence Assessment Adherent Non-adherent - - Adherent - Adherent   Reason for Non-adherence - Drug on hold - - - - -   Adherence Intervention Recommended - None - - - - -   Adverse Effects Diarrhea Fatigue;Other (see note for details) - - Other (See Note for Details) Neutropenia Fatigue   Diarrhea Grade 1 - - - - - -   Pharmacist Intervention(diarrhea) Yes - - - - - -   Fatigue - Grade 1 - - - - Grade 1   Pharmacist Intervention(fatigue) - No - - - - No   Neutropenia - - - - - Grade 3 -   Pharmacist Intervention(neutropenia) - - - - - No -   Other (See Note for Details) - bruising - - bruising, muscle cramp - -   Pharmacist intervention(other) - No - - No - -  "  Any new drug interactions? No No - - No - No   Pharmacist Intervention? - - - - - - -   Intervention(s) - - - - - - -   Is the dose as ordered appropriate for the patient? Yes - - - Yes - Yes   Is the patient currently in pain? No No - - No - No   Has the patient been assessed within the past 6 months for depression? No Yes - - No - Yes   Has the patient missed any days of school, work, or other routine activity? No - - - No - No   Since the last time we talked, have you been hospitalized or used the emergency room? - - - - Yes - No   What medical service did you use? - - - - Emergency Room - -   How many emergency room visits? - - - - (No Data) - -   How many emergency room visits were related to the cancer medication? - - - - 0 - -       Last PHQ-2 Score on record:   PHQ-2 ( 1999 [employer]) 6/22/2021 2/18/2021   Q1: Little interest or pleasure in doing things 0 0   Q2: Feeling down, depressed or hopeless 0 0   PHQ-2 Score 0 0       Vitals:  BP:   BP Readings from Last 1 Encounters:   07/12/21 (!) 146/82     Wt Readings from Last 1 Encounters:   07/12/21 96.9 kg (213 lb 11.2 oz)     Estimated body surface area is 2.17 meters squared as calculated from the following:    Height as of 6/8/21: 1.753 m (5' 9\").    Weight as of 7/12/21: 96.9 kg (213 lb 11.2 oz).    Labs:  _  Result Component Current Result Ref Range   Sodium 136 (7/13/2021) 133 - 144 mmol/L     _  Result Component Current Result Ref Range   Potassium 4.3 (7/13/2021) 3.4 - 5.3 mmol/L     _  Result Component Current Result Ref Range   Calcium 9.6 (7/13/2021) 8.5 - 10.1 mg/dL     No results found for Mag within last 30 days.     No results found for Phos within last 30 days.     _  Result Component Current Result Ref Range   Albumin 3.7 (7/13/2021) 3.4 - 5.0 g/dL     _  Result Component Current Result Ref Range   Urea Nitrogen 24 (7/13/2021) 7 - 30 mg/dL     _  Result Component Current Result Ref Range   Creatinine 1.54 (H) (7/13/2021) 0.66 - 1.25 mg/dL "     _  Result Component Current Result Ref Range   AST 14 (7/13/2021) 0 - 45 U/L     _  Result Component Current Result Ref Range   ALT 19 (7/13/2021) 0 - 70 U/L     _  Result Component Current Result Ref Range   Bilirubin Total 0.8 (7/13/2021) 0.2 - 1.3 mg/dL     _  Result Component Current Result Ref Range   WBC Count 13.2 (H) (7/13/2021) 4.0 - 11.0 10e3/uL     _  Result Component Current Result Ref Range   Hemoglobin 13.1 (L) (7/13/2021) 13.3 - 17.7 g/dL     _  Result Component Current Result Ref Range   Platelet Count 109 (L) (7/13/2021) 150 - 450 10e3/uL     _  Result Component Current Result Ref Range   Absolute Neutrophils 2.0 (7/13/2021) 1.6 - 8.3 10e3/uL         Assessment/Plan:  Grade 1 fatigue-relieved by rest.  Black spots on hands/feet: question whether they are petechiae.  It is not painful and doesn't cause him a problem.    Follow-Up:  08/10/21 0930    Refill Due:  Has refills thru 12/31/21    Marty Meese, Pharm.D., BCOP

## 2021-07-15 ENCOUNTER — OFFICE VISIT (OUTPATIENT)
Dept: PEDIATRICS | Facility: CLINIC | Age: 81
End: 2021-07-15
Payer: COMMERCIAL

## 2021-07-15 VITALS
SYSTOLIC BLOOD PRESSURE: 146 MMHG | HEART RATE: 89 BPM | BODY MASS INDEX: 31.9 KG/M2 | WEIGHT: 216 LBS | RESPIRATION RATE: 18 BRPM | DIASTOLIC BLOOD PRESSURE: 78 MMHG | TEMPERATURE: 97.2 F | OXYGEN SATURATION: 97 %

## 2021-07-15 DIAGNOSIS — R41.3 MEMORY LOSS: ICD-10-CM

## 2021-07-15 DIAGNOSIS — R97.20 ELEVATED PROSTATE SPECIFIC ANTIGEN (PSA): ICD-10-CM

## 2021-07-15 DIAGNOSIS — N18.31 STAGE 3A CHRONIC KIDNEY DISEASE (H): ICD-10-CM

## 2021-07-15 DIAGNOSIS — E11.649 TYPE 2 DIABETES MELLITUS WITH HYPOGLYCEMIA WITHOUT COMA, WITHOUT LONG-TERM CURRENT USE OF INSULIN (H): Primary | ICD-10-CM

## 2021-07-15 DIAGNOSIS — Z12.5 ENCOUNTER FOR SCREENING FOR MALIGNANT NEOPLASM OF PROSTATE: ICD-10-CM

## 2021-07-15 DIAGNOSIS — I10 HYPERTENSION GOAL BP (BLOOD PRESSURE) < 140/90: ICD-10-CM

## 2021-07-15 PROCEDURE — 99214 OFFICE O/P EST MOD 30 MIN: CPT | Performed by: INTERNAL MEDICINE

## 2021-07-15 RX ORDER — BLOOD SUGAR DIAGNOSTIC
STRIP MISCELLANEOUS
Qty: 100 STRIP | Refills: 1 | Status: SHIPPED | OUTPATIENT
Start: 2021-07-15 | End: 2021-09-20

## 2021-07-15 NOTE — PATIENT INSTRUCTIONS
Please check your blood pressure 3-4 times per week for the next two weeks, then send me a picture of the log and I'll let you know if we need to increase your blood pressure medication.     There are 1mg melatonin gummies.  You can try taking 1 mg or 1/2 mg when you wake in the middle of the night to help fall back asleep.

## 2021-07-15 NOTE — PROGRESS NOTES
"Assessment & Plan     Type 2 diabetes mellitus with hypoglycemia without coma, without long-term current use of insulin (H)  Well controlled, will check A1c with next months labs.  Needs accu-check strips. Filled.   - ACCU-CHEK GUIDE test strip; Use to test blood sugar 2 times daily or as directed.    Hypertension goal BP (blood pressure) < 140/90  Has not been checking his BP at home, will start doing so, and [facility]  May need to increase losartan from 50 to 100.     Stage 3a chronic kidney disease  Stable.     Elevated prostate specific antigen (PSA)  History of elevated PSA with increasing symptoms of reduced stream and nocturia.  Can discuss with urology.   - PSA, screen  - Adult Urology Referral; Future    Encounter for screening for malignant neoplasm of prostate   As above.   - PSA, screen    Memory loss  Refer to OT for evaluation and assistance with memory modifications at home.   - Occupation Therapy Referral; Future             BMI:   Estimated body mass index is 31.9 kg/m  as calculated from the following:    Height as of 6/8/21: 1.753 m (5' 9\").    Weight as of this encounter: 98 kg (216 lb).   Weight management plan: Discussed healthy diet and exercise guidelines        Return in about 3 months (around 10/15/2021) for Follow up.    Vanessa Munson MD  [facility] CYRUS Avila is a 80 year old who presents for the following health issues     HPI     Rash  Onset/Duration: 2 months  Description  Location: hands and fett  Character: round, black in color  Itching: mild  Intensity:  mild  Progression of Symptoms:  worsening  Accompanying signs and symptoms:   Fever: no  Body aches or joint pain: no  Sore throat symptoms: no  Recent cold symptoms: no  History:           Previous episodes of similar rash: None  New exposures:  None  Recent travel: not out of country but [facility] 07/04  Exposure to similar rash: no  Precipitating or alleviating " factors: none  Therapies tried and outcome: none    1. Pt states he also has concerns regarding his memory. He states things slip his memory but now it seems to be simple things. Feels this has accelerated in the past year or so. Thought maybe it could be due to the imbruvica chemotherapy medication.     2. Falls asleep easily, always wakes up at 12:30-1:30, generally goes to the couch and then falls asleep again. Also wakes up to urinate.     3. Urine stream slowing to a dribble. This has been a change in the past couple of years.    4. Petechiae/blood blisters on the palms and soles of the feet.     5. Blood glucose kit - can't find it since his recent vacation. Found 4 old ones while he was looking for it.        Review of Systems   Constitutional, HEENT, cardiovascular, pulmonary, gi and gu systems are negative, except as otherwise noted.      Objective    BP (!) 146/78   Pulse 89   Temp 97.2  F (36.2  C) (Tympanic)   Resp 18   Wt 98 kg (216 lb)   SpO2 97%   BMI 31.90 kg/m    Body mass index is 31.9 kg/m .  Physical Exam   GENERAL: healthy, alert and no distress  EYES: Eyes grossly normal to inspection, PERRL and conjunctivae and sclerae normal  NECK: no adenopathy, no asymmetry, masses, or scars and thyroid normal to palpation  RESP: lungs clear to auscultation - no rales, rhonchi or wheezes  CV: regular rate and rhythm, normal S1 S2, no S3 or S4, no murmur, click or rub, no peripheral edema and peripheral pulses strong  MS: no gross musculoskeletal defects noted, no edema  SKIN: no suspicious lesions or rashes  NEURO: Normal strength and tone, mentation intact and speech normal  PSYCH: mentation appears normal, affect normal/bright

## 2021-07-21 ENCOUNTER — TELEPHONE (OUTPATIENT)
Dept: PEDIATRICS | Facility: CLINIC | Age: 81
End: 2021-07-21

## 2021-07-21 NOTE — TELEPHONE ENCOUNTER
"LVM for patient to calll us back. Dr. Munson would like us to ask the patient the following \" please call patient to see if he needs a new meter for contour strips or just strips. \"     Fany May CMA on 7/21/2021 at 12:46 PM   "

## 2021-07-22 NOTE — TELEPHONE ENCOUNTER
M for patient to call us back regarding the note below. Fany May, JERI on 7/22/2021 at 11:29 AM

## 2021-07-29 NOTE — TELEPHONE ENCOUNTER
LVM for patient to call us back to ask the question on my bottom note. Fany May, CMA on 7/29/2021 at 2:23 PM

## 2021-07-30 ENCOUNTER — APPOINTMENT (OUTPATIENT)
Dept: CT IMAGING | Facility: CLINIC | Age: 81
DRG: 308 | End: 2021-07-30
Attending: EMERGENCY MEDICINE
Payer: COMMERCIAL

## 2021-07-30 ENCOUNTER — APPOINTMENT (OUTPATIENT)
Dept: CARDIOLOGY | Facility: CLINIC | Age: 81
DRG: 308 | End: 2021-07-30
Attending: INTERNAL MEDICINE
Payer: COMMERCIAL

## 2021-07-30 ENCOUNTER — HOSPITAL ENCOUNTER (INPATIENT)
Facility: CLINIC | Age: 81
LOS: 3 days | Discharge: HOME | DRG: 308 | End: 2021-08-02
Attending: EMERGENCY MEDICINE | Admitting: INTERNAL MEDICINE
Payer: COMMERCIAL

## 2021-07-30 DIAGNOSIS — R07.9 CHEST PAIN, UNSPECIFIED TYPE: ICD-10-CM

## 2021-07-30 DIAGNOSIS — I48.91 ATRIAL FIBRILLATION, UNSPECIFIED TYPE (H): ICD-10-CM

## 2021-07-30 DIAGNOSIS — C91.10 CLL (CHRONIC LYMPHOCYTIC LEUKEMIA) (H): ICD-10-CM

## 2021-07-30 DIAGNOSIS — I42.9 CARDIOMYOPATHY, UNSPECIFIED TYPE (H): Primary | ICD-10-CM

## 2021-07-30 LAB
ANION GAP SERPL CALCULATED.3IONS-SCNC: 6 MMOL/L (ref 3–14)
ATRIAL RATE - MUSE: 125 BPM
BASOPHILS # BLD MANUAL: 0 10E3/UL (ref 0–0.2)
BASOPHILS # BLD MANUAL: 0 10E3/UL (ref 0–0.2)
BASOPHILS NFR BLD MANUAL: 0 %
BASOPHILS NFR BLD MANUAL: 0 %
BUN SERPL-MCNC: 29 MG/DL (ref 7–30)
CALCIUM SERPL-MCNC: 8.8 MG/DL (ref 8.5–10.1)
CHLORIDE BLD-SCNC: 105 MMOL/L (ref 94–109)
CO2 SERPL-SCNC: 24 MMOL/L (ref 20–32)
CREAT SERPL-MCNC: 1.58 MG/DL (ref 0.66–1.25)
D DIMER PPP FEU-MCNC: 2.11 UG/ML FEU (ref 0–0.5)
DIASTOLIC BLOOD PRESSURE - MUSE: NORMAL MMHG
EOSINOPHIL # BLD MANUAL: 0.2 10E3/UL (ref 0–0.7)
EOSINOPHIL # BLD MANUAL: 0.2 10E3/UL (ref 0–0.7)
EOSINOPHIL NFR BLD MANUAL: 2 %
EOSINOPHIL NFR BLD MANUAL: 2 %
ERYTHROCYTE [DISTWIDTH] IN BLOOD BY AUTOMATED COUNT: 13.4 % (ref 10–15)
ERYTHROCYTE [DISTWIDTH] IN BLOOD BY AUTOMATED COUNT: 13.8 % (ref 10–15)
GFR SERPL CREATININE-BSD FRML MDRD: 41 ML/MIN/1.73M2
GLUCOSE BLD-MCNC: 198 MG/DL (ref 70–99)
GLUCOSE BLDC GLUCOMTR-MCNC: 151 MG/DL (ref 70–99)
GLUCOSE BLDC GLUCOMTR-MCNC: 158 MG/DL (ref 70–99)
GLUCOSE BLDC GLUCOMTR-MCNC: 158 MG/DL (ref 70–99)
GLUCOSE BLDC GLUCOMTR-MCNC: 162 MG/DL (ref 70–99)
GLUCOSE BLDC GLUCOMTR-MCNC: 171 MG/DL (ref 70–99)
GLUCOSE BLDC GLUCOMTR-MCNC: 182 MG/DL (ref 70–99)
GLUCOSE BLDC GLUCOMTR-MCNC: 189 MG/DL (ref 70–99)
HBA1C MFR BLD: 7 % (ref 0–5.6)
HCT VFR BLD AUTO: 37.8 % (ref 40–53)
HCT VFR BLD AUTO: 38.6 % (ref 40–53)
HGB BLD-MCNC: 12.5 G/DL (ref 13.3–17.7)
HGB BLD-MCNC: 12.8 G/DL (ref 13.3–17.7)
HOLD SPECIMEN: NORMAL
INTERPRETATION ECG - MUSE: NORMAL
LVEF ECHO: NORMAL
LYMPHOCYTES # BLD MANUAL: 5.1 10E3/UL (ref 0.8–5.3)
LYMPHOCYTES # BLD MANUAL: 5.6 10E3/UL (ref 0.8–5.3)
LYMPHOCYTES NFR BLD MANUAL: 48 %
LYMPHOCYTES NFR BLD MANUAL: 48 %
MCH RBC QN AUTO: 32.3 PG (ref 26.5–33)
MCH RBC QN AUTO: 32.4 PG (ref 26.5–33)
MCHC RBC AUTO-ENTMCNC: 33.1 G/DL (ref 31.5–36.5)
MCHC RBC AUTO-ENTMCNC: 33.2 G/DL (ref 31.5–36.5)
MCV RBC AUTO: 98 FL (ref 78–100)
MCV RBC AUTO: 98 FL (ref 78–100)
METAMYELOCYTES # BLD MANUAL: 0.1 10E3/UL
METAMYELOCYTES NFR BLD MANUAL: 1 %
MONOCYTES # BLD MANUAL: 0.2 10E3/UL (ref 0–1.3)
MONOCYTES # BLD MANUAL: 0.4 10E3/UL (ref 0–1.3)
MONOCYTES NFR BLD MANUAL: 2 %
MONOCYTES NFR BLD MANUAL: 4 %
NEUTROPHILS # BLD MANUAL: 4.8 10E3/UL (ref 1.6–8.3)
NEUTROPHILS # BLD MANUAL: 5.6 10E3/UL (ref 1.6–8.3)
NEUTROPHILS NFR BLD MANUAL: 45 %
NEUTROPHILS NFR BLD MANUAL: 48 %
P AXIS - MUSE: NORMAL DEGREES
PLAT MORPH BLD: ABNORMAL
PLAT MORPH BLD: ABNORMAL
PLATELET # BLD AUTO: 102 10E3/UL (ref 150–450)
PLATELET # BLD AUTO: 103 10E3/UL (ref 150–450)
POTASSIUM BLD-SCNC: 3.9 MMOL/L (ref 3.4–5.3)
PR INTERVAL - MUSE: NORMAL MS
QRS DURATION - MUSE: 74 MS
QT - MUSE: 256 MS
QTC - MUSE: 361 MS
R AXIS - MUSE: -1 DEGREES
RBC # BLD AUTO: 3.86 10E6/UL (ref 4.4–5.9)
RBC # BLD AUTO: 3.96 10E6/UL (ref 4.4–5.9)
RBC MORPH BLD: ABNORMAL
RBC MORPH BLD: ABNORMAL
SARS-COV-2 RNA RESP QL NAA+PROBE: NEGATIVE
SODIUM SERPL-SCNC: 135 MMOL/L (ref 133–144)
SYSTOLIC BLOOD PRESSURE - MUSE: NORMAL MMHG
T AXIS - MUSE: 9 DEGREES
TROPONIN I SERPL-MCNC: <0.015 UG/L (ref 0–0.04)
VENTRICULAR RATE- MUSE: 120 BPM
WBC # BLD AUTO: 10.7 10E3/UL (ref 4–11)
WBC # BLD AUTO: 11.6 10E3/UL (ref 4–11)

## 2021-07-30 PROCEDURE — 80048 BASIC METABOLIC PNL TOTAL CA: CPT | Performed by: EMERGENCY MEDICINE

## 2021-07-30 PROCEDURE — 250N000009 HC RX 250: Performed by: EMERGENCY MEDICINE

## 2021-07-30 PROCEDURE — 85027 COMPLETE CBC AUTOMATED: CPT | Performed by: INTERNAL MEDICINE

## 2021-07-30 PROCEDURE — 93005 ELECTROCARDIOGRAM TRACING: CPT

## 2021-07-30 PROCEDURE — 93306 TTE W/DOPPLER COMPLETE: CPT | Mod: 26 | Performed by: INTERNAL MEDICINE

## 2021-07-30 PROCEDURE — 87635 SARS-COV-2 COVID-19 AMP PRB: CPT | Performed by: EMERGENCY MEDICINE

## 2021-07-30 PROCEDURE — 250N000013 HC RX MED GY IP 250 OP 250 PS 637: Performed by: INTERNAL MEDICINE

## 2021-07-30 PROCEDURE — 83036 HEMOGLOBIN GLYCOSYLATED A1C: CPT | Performed by: INTERNAL MEDICINE

## 2021-07-30 PROCEDURE — 93306 TTE W/DOPPLER COMPLETE: CPT

## 2021-07-30 PROCEDURE — 99223 1ST HOSP IP/OBS HIGH 75: CPT | Mod: AI | Performed by: INTERNAL MEDICINE

## 2021-07-30 PROCEDURE — 84484 ASSAY OF TROPONIN QUANT: CPT | Performed by: INTERNAL MEDICINE

## 2021-07-30 PROCEDURE — 120N000004 HC R&B MS OVERFLOW

## 2021-07-30 PROCEDURE — 250N000011 HC RX IP 250 OP 636: Performed by: EMERGENCY MEDICINE

## 2021-07-30 PROCEDURE — 36415 COLL VENOUS BLD VENIPUNCTURE: CPT | Performed by: INTERNAL MEDICINE

## 2021-07-30 PROCEDURE — 250N000013 HC RX MED GY IP 250 OP 250 PS 637: Performed by: EMERGENCY MEDICINE

## 2021-07-30 PROCEDURE — 250N000012 HC RX MED GY IP 250 OP 636 PS 637: Performed by: INTERNAL MEDICINE

## 2021-07-30 PROCEDURE — 85379 FIBRIN DEGRADATION QUANT: CPT | Performed by: EMERGENCY MEDICINE

## 2021-07-30 PROCEDURE — 71275 CT ANGIOGRAPHY CHEST: CPT

## 2021-07-30 PROCEDURE — 84484 ASSAY OF TROPONIN QUANT: CPT | Performed by: EMERGENCY MEDICINE

## 2021-07-30 PROCEDURE — 99222 1ST HOSP IP/OBS MODERATE 55: CPT | Mod: 25 | Performed by: INTERNAL MEDICINE

## 2021-07-30 PROCEDURE — 99285 EMERGENCY DEPT VISIT HI MDM: CPT | Mod: 25

## 2021-07-30 PROCEDURE — 36415 COLL VENOUS BLD VENIPUNCTURE: CPT | Performed by: EMERGENCY MEDICINE

## 2021-07-30 PROCEDURE — 85027 COMPLETE CBC AUTOMATED: CPT | Performed by: EMERGENCY MEDICINE

## 2021-07-30 PROCEDURE — C9803 HOPD COVID-19 SPEC COLLECT: HCPCS

## 2021-07-30 PROCEDURE — 250N000011 HC RX IP 250 OP 636: Performed by: INTERNAL MEDICINE

## 2021-07-30 RX ORDER — POTASSIUM CHLORIDE 1500 MG/1
40 TABLET, EXTENDED RELEASE ORAL
Status: CANCELLED | OUTPATIENT
Start: 2021-07-30

## 2021-07-30 RX ORDER — POTASSIUM CHLORIDE 1500 MG/1
20 TABLET, EXTENDED RELEASE ORAL
Status: CANCELLED | OUTPATIENT
Start: 2021-07-30

## 2021-07-30 RX ORDER — METOPROLOL TARTRATE 50 MG
50 TABLET ORAL 4 TIMES DAILY
Status: DISCONTINUED | OUTPATIENT
Start: 2021-07-30 | End: 2021-07-31

## 2021-07-30 RX ORDER — LIDOCAINE 40 MG/G
CREAM TOPICAL
Status: DISCONTINUED | OUTPATIENT
Start: 2021-07-30 | End: 2021-08-02 | Stop reason: HOSPADM

## 2021-07-30 RX ORDER — ONDANSETRON 4 MG/1
4 TABLET, ORALLY DISINTEGRATING ORAL EVERY 6 HOURS PRN
Status: DISCONTINUED | OUTPATIENT
Start: 2021-07-30 | End: 2021-08-02 | Stop reason: HOSPADM

## 2021-07-30 RX ORDER — IOPAMIDOL 755 MG/ML
500 INJECTION, SOLUTION INTRAVASCULAR ONCE
Status: COMPLETED | OUTPATIENT
Start: 2021-07-30 | End: 2021-07-30

## 2021-07-30 RX ORDER — HEPARIN SODIUM 10000 [USP'U]/100ML
0-5000 INJECTION, SOLUTION INTRAVENOUS CONTINUOUS
Status: DISPENSED | OUTPATIENT
Start: 2021-07-30 | End: 2021-07-30

## 2021-07-30 RX ORDER — METOPROLOL TARTRATE 25 MG/1
25 TABLET, FILM COATED ORAL 2 TIMES DAILY
Status: DISCONTINUED | OUTPATIENT
Start: 2021-07-30 | End: 2021-07-30

## 2021-07-30 RX ORDER — ASPIRIN 81 MG/1
81 TABLET ORAL DAILY
Status: DISCONTINUED | OUTPATIENT
Start: 2021-07-31 | End: 2021-07-30

## 2021-07-30 RX ORDER — ACETAMINOPHEN 650 MG/1
650 SUPPOSITORY RECTAL EVERY 6 HOURS PRN
Status: DISCONTINUED | OUTPATIENT
Start: 2021-07-30 | End: 2021-08-02 | Stop reason: HOSPADM

## 2021-07-30 RX ORDER — ACETAMINOPHEN 325 MG/1
650 TABLET ORAL EVERY 6 HOURS PRN
Status: DISCONTINUED | OUTPATIENT
Start: 2021-07-30 | End: 2021-08-02 | Stop reason: HOSPADM

## 2021-07-30 RX ORDER — DEXTROSE MONOHYDRATE 25 G/50ML
25-50 INJECTION, SOLUTION INTRAVENOUS
Status: DISCONTINUED | OUTPATIENT
Start: 2021-07-30 | End: 2021-08-02 | Stop reason: HOSPADM

## 2021-07-30 RX ORDER — NICOTINE POLACRILEX 4 MG
15-30 LOZENGE BUCCAL
Status: DISCONTINUED | OUTPATIENT
Start: 2021-07-30 | End: 2021-08-02 | Stop reason: HOSPADM

## 2021-07-30 RX ORDER — DESONIDE 0.5 MG/G
OINTMENT TOPICAL 2 TIMES DAILY
COMMUNITY
End: 2021-09-20

## 2021-07-30 RX ORDER — CLOPIDOGREL BISULFATE 75 MG/1
75 TABLET ORAL DAILY
Status: DISCONTINUED | OUTPATIENT
Start: 2021-07-31 | End: 2021-08-02

## 2021-07-30 RX ORDER — ONDANSETRON 2 MG/ML
4 INJECTION INTRAMUSCULAR; INTRAVENOUS EVERY 6 HOURS PRN
Status: DISCONTINUED | OUTPATIENT
Start: 2021-07-30 | End: 2021-08-02 | Stop reason: HOSPADM

## 2021-07-30 RX ORDER — MAGNESIUM SULFATE HEPTAHYDRATE 40 MG/ML
2 INJECTION, SOLUTION INTRAVENOUS
Status: CANCELLED | OUTPATIENT
Start: 2021-07-30

## 2021-07-30 RX ORDER — ROSUVASTATIN CALCIUM 20 MG/1
20 TABLET, COATED ORAL AT BEDTIME
Status: DISCONTINUED | OUTPATIENT
Start: 2021-07-30 | End: 2021-08-02 | Stop reason: HOSPADM

## 2021-07-30 RX ORDER — ASPIRIN 325 MG
325 TABLET ORAL ONCE
Status: COMPLETED | OUTPATIENT
Start: 2021-07-30 | End: 2021-07-30

## 2021-07-30 RX ORDER — METOPROLOL TARTRATE 25 MG/1
25 TABLET, FILM COATED ORAL 4 TIMES DAILY
Status: DISCONTINUED | OUTPATIENT
Start: 2021-07-30 | End: 2021-07-30

## 2021-07-30 RX ADMIN — METOPROLOL TARTRATE 25 MG: 25 TABLET, FILM COATED ORAL at 09:15

## 2021-07-30 RX ADMIN — IOPAMIDOL 71 ML: 755 INJECTION, SOLUTION INTRAVENOUS at 04:25

## 2021-07-30 RX ADMIN — APIXABAN 5 MG: 5 TABLET, FILM COATED ORAL at 21:06

## 2021-07-30 RX ADMIN — HEPARIN SODIUM 1150 UNITS/HR: 10000 INJECTION, SOLUTION INTRAVENOUS at 14:49

## 2021-07-30 RX ADMIN — ROSUVASTATIN CALCIUM 20 MG: 20 TABLET, FILM COATED ORAL at 22:59

## 2021-07-30 RX ADMIN — ASPIRIN 325 MG ORAL TABLET 325 MG: 325 PILL ORAL at 06:03

## 2021-07-30 RX ADMIN — INSULIN ASPART 1 UNITS: 100 INJECTION, SOLUTION INTRAVENOUS; SUBCUTANEOUS at 09:37

## 2021-07-30 RX ADMIN — METOPROLOL TARTRATE 50 MG: 50 TABLET, FILM COATED ORAL at 14:11

## 2021-07-30 RX ADMIN — SODIUM CHLORIDE 89 ML: 9 INJECTION, SOLUTION INTRAVENOUS at 04:24

## 2021-07-30 RX ADMIN — METOPROLOL TARTRATE 50 MG: 50 TABLET, FILM COATED ORAL at 22:59

## 2021-07-30 RX ADMIN — INSULIN ASPART 1 UNITS: 100 INJECTION, SOLUTION INTRAVENOUS; SUBCUTANEOUS at 13:22

## 2021-07-30 RX ADMIN — METOPROLOL TARTRATE 50 MG: 50 TABLET, FILM COATED ORAL at 18:44

## 2021-07-30 RX ADMIN — ACETAMINOPHEN 650 MG: 325 TABLET, FILM COATED ORAL at 16:32

## 2021-07-30 RX ADMIN — ACETAMINOPHEN 650 MG: 325 TABLET, FILM COATED ORAL at 10:23

## 2021-07-30 RX ADMIN — INSULIN ASPART 1 UNITS: 100 INJECTION, SOLUTION INTRAVENOUS; SUBCUTANEOUS at 16:18

## 2021-07-30 ASSESSMENT — ACTIVITIES OF DAILY LIVING (ADL)
ADLS_ACUITY_SCORE: 14
ADLS_ACUITY_SCORE: 13

## 2021-07-30 ASSESSMENT — ENCOUNTER SYMPTOMS
NAUSEA: 0
BLOOD IN STOOL: 0
PALPITATIONS: 1
VOMITING: 0
FEVER: 0
DIARRHEA: 0
COUGH: 0
HEMATURIA: 0

## 2021-07-30 ASSESSMENT — MIFFLIN-ST. JEOR: SCORE: 1651.38

## 2021-07-30 NOTE — ED NOTES
"[facility]  ED Nurse Handoff Report    Austin Garza is a 81 year old male   ED Chief complaint: Chest Pain  . ED Diagnosis:   Final diagnoses:   Chest pain, unspecified type   Atrial fibrillation, unspecified type (H)     Allergies:   Allergies   Allergen Reactions     Ace Inhibitors      cough       Code Status: Full Code  Activity level - Baseline/Home:  Independent. Activity Level - Current:   Stand by Assist. Lift room needed: No. Bariatric: No   Needed: No   Isolation: No. Infection: Not Applicable.     Vital Signs:   Vitals:    07/30/21 0515 07/30/21 0530 07/30/21 0545 07/30/21 0600   BP: (!) 156/92 (!) 159/89 (!) 148/89 (!) 150/80   Pulse: 115 (!) 126 (!) 123 114   Resp:       Temp:       TempSrc:       SpO2: 96% 96% 96% 100%       Cardiac Rhythm:  ,   Cardiac  Cardiac Rhythm: Atrial fibrillation  Pain level:    Patient confused: No. Patient Falls Risk: Yes.   Elimination Status: Unable to void   Patient Report - Initial Complaint:   03:03 ED Triage Notes Filed Patient arrived from home via EMS with complaints of chest pain. Patient has had chest pain since Monday intermittently and said his pulse \"just didn't feel right\" and gets worse with breathing and activity. Patient has a hx of leukemia. [facility] EMS arrival patient was found to be in Afib RVR with  HR in the 150s, EMS gave him 10mg verapamil and HR went down to 120s. ,  systolic. GCS 15.      . Focused Assessment:   03:18 Cardiac Chest Pain Assessment - Rating (0-10):  (Denies chest pain currently)  Cardiac Monitoring - EKG Monitoring: Yes  Cardiac Regularity: Irregular  Cardiac Rhythm: Atrial fibrillation  KA     03:18 Respiratory Respiratory - Respiratory WDL: rhythm/pattern  Rhythm/Pattern, Respiratory: dyspnea on exertion         Tests Performed: Labs, CT. Abnormal Results:   Labs Ordered and Resulted from Time of ED Arrival Up to the Time of Departure from the ED   BASIC METABOLIC PANEL - Abnormal; Notable " for the following components:       Result Value    Creatinine 1.58 (*)     Glucose 198 (*)     GFR Estimate 41 (*)     All other components within normal limits   D DIMER QUANTITATIVE - Abnormal; Notable for the following components:    D-Dimer Quantitative 2.11 (*)     All other components within normal limits    Narrative:     This D-dimer assay is intended for use in conjunction with a clinical pretest probability assessment model to exclude pulmonary embolism (PE) and deep venous thrombosis (DVT) in outpatients suspected of PE or DVT. The cut-off value is 0.50 ug/mL FEU.   CBC WITH PLATELETS AND DIFFERENTIAL - Abnormal; Notable for the following components:    WBC Count 11.6 (*)     RBC Count 3.96 (*)     Hemoglobin 12.8 (*)     Hematocrit 38.6 (*)     Platelet Count 102 (*)     All other components within normal limits   DIFFERENTIAL - Abnormal; Notable for the following components:    Absolute Lymphocytes 5.6 (*)     All other components within normal limits   TROPONIN I - Normal   EXTRA RED TOP TUBE   SARS-COV2 (COVID-19) VIRUS RT-PCR   TELEMETRY MONITORING CARDIOLOGY ADULT   COVID-19 VIRUS (CORONAVIRUS) BY PCR    Narrative:     The following orders were created for panel order Asymptomatic COVID-19 Virus (Coronavirus) by PCR.  Procedure                               Abnormality         Status                     ---------                               -----------         ------                     SARS-COV2 (COVID-19) Vir...[[id number]]                                                   Please view results for these tests on the individual orders.   CBC WITH PLATELETS & DIFFERENTIAL    Narrative:     The following orders were created for panel order CBC + differential.  Procedure                               Abnormality         Status                     ---------                               -----------         ------                     CBC with platelets and d...[[id number]]  Abnormal            Final  result               Manual Differential[[id number]]          Abnormal            Final result                 Please view results for these tests on the individual orders.   EXTRA TUBE    Narrative:     The following orders were created for panel order Extra Tube ([place] Draw).  Procedure                               Abnormality         Status                     ---------                               -----------         ------                     Extra Red Top Tube[[id number]]                               Final result                 Please view results for these tests on the individual orders.     CT Chest Pulmonary Embolism w Contrast   Final Result   IMPRESSION:   1.  Negative for pulmonary embolism.      2.  Ectatic ascending thoracic aorta measuring 4.3 cm in diameter. No evidence for dissection.      3.  Atelectasis in the lower lungs but nothing definite for acute pulmonary infiltrate or pneumonitis.      4.  Cardiac enlargement and trace pericardial effusion.      5.  Marked coronary artery calcification.                       .   Treatments provided: see MAR  Family Comments: Wife at bedside  OBS brochure/video discussed/provided to patient:  N/A  ED Medications:   Medications   CT Flush 100mL Saline (89 mLs Intravenous Given 7/30/21 0424)   iopamidol (ISOVUE-370) solution 500 mL (71 mLs Intravenous Given 7/30/21 0425)   aspirin (ASA) tablet 325 mg (325 mg Oral Given 7/30/21 0603)     Drips infusing:  No  For the majority of the shift, the patient's behavior Green.    Sepsis treatment initiated: No     Patient tested for COVID 19 prior to admission: YES    ED Nurse Name/Phone Number: Holley Jacobs RN,   6:06 AM

## 2021-07-30 NOTE — H&P
Admitted: 07/30/2021    CHIEF COMPLAINT:  Chest pain.    HISTORY OF PRESENT ILLNESS:  This is a 81-year-old gentleman with a history of type 2 diabetes, hypertension, chronic lymphocytic leukemia on treatment, prior history of abdominal aortic aneurysm, status post endovascular repair on Plavix, who presents with pain in his chest.  The pain in his chest has been on since Monday.  It comes and goes.  He feels like he has been hit by a softball and the sternum.  It is unrelated to activity.  It normally lasts for an hour and then dissipates.  However, today he was woken up by pain in his chest and he also had pain in his shoulder blades, which was worse with deep inspiration.  He denies any worsening shortness of breath.  He denies any radiation of the pain.  He denies any nausea, vomiting or diaphoresis.  In the ER, he was seen by Dr. Delacruz.  I discussed care with her.  I am asked to admit him for further evaluation.    PAST MEDICAL HISTORY:  Diabetes, hypertension, squamous cell carcinoma of the jaw, chronic lymphocytic leukemia.    PAST SURGICAL HISTORY:  Significant for open appendectomy, squamous cell skin cancer resection, endovascular repair of the abdominal aortic aneurysm.    FAMILY HISTORY:  Significant for coronary artery disease in his mother.    MEDICATIONS:  His home medication list includes Plavix, aspirin, Imbruvica, metformin, losartan, Crestor.    REVIEW OF SYSTEMS:  As mentioned in the HPI.  All other systems are reviewed and deemed unremarkable and negative.      ALLERGIES:  HE IS ALLERGIC TO ACE INHIBITORS DUE TO A COUGH.    SOCIAL HISTORY:  He is .  He does not smoke.  He does drink a glass of wine every day.  He denies any prior withdrawal symptoms.    PHYSICAL EXAMINATION:    VITAL SIGNS:  His temperature is 98.5.  His pulse has been in the 120s, blood pressure is 150/80.  Respiratory rate 18, O2 sat is 100% on room air.  GENERAL:  He is alert, awake, oriented, coherent, nontoxic, in  no acute distress.  HEENT:  Pupils equal, round, reactive to light.  LUNGS:  Clear to auscultation bilaterally.  HEART:  His heart is tachycardic, irregularly irregular, S1, S2 normal.  No murmurs or gallops.  ABDOMEN:  Soft, nontender, nondistended with good bowel sounds.  EXTREMITIES:  There is no edema.  SKIN:  There is no rash.    NEUROLOGICAL:  He moves all his extremities.    LABORATORY DATA:  Obtained included a basic metabolic panel which is grossly unremarkable other than a creatinine of 1.58 and a GFR of 41.  His troponin is less than 0.015.  On his CBC his white cell count is 11.6, hemoglobin 12.8, hematocrit 38.6, platelet count of 102.  His D-dimer was 2.11.  He went on to have a CT chest PE protocol, which was negative for PE.  It showed an ectatic ascending thoracic aorta measuring 4.3 cm in diameter.  No evidence of dissection, atelectasis in the lower lungs, but nothing definite for acute pulmonary infiltrate or pneumonitis, cardiac enlargement and trace pericardial effusion, mild coronary artery calcification and EKG shows AFib with rapid ventricular rate at 120 beats per minute.    IMPRESSION AND PLAN:    1.  Chest pain in the setting of atrial fibrillation with rapid ventricular response.  We will admit him as an inpatient.  We will monitor him on telemetry.  We will get serial troponins on him.  We will get an echocardiogram and Cardiology consultation.  He is at high risk for coronary artery disease given his age as well as a history of aneurysm, which required repair along with hypertension and hyperlipidemia.  2.  Diabetes.  We will place him on Accu-Cheks along with sliding scale insulin.  3.  Chronic kidney disease.  Avoid nephrotoxins.  4.  History of CLL.  He will need resumption of his home medications once reconciled.    CODE STATUS:  FULL CODE.    He will be admitted as an inpatient.    Elmira Madrigal MD        D: 07/30/2021   T: 07/30/2021   MT: calderon    Name:     MARK MOAR  BENJAMIN  MRN:      [id number]        Account:     [id number]   :      1940           Admitted:    2021       Document: [id number]

## 2021-07-30 NOTE — PROGRESS NOTES
Patient seen and examined.  Admission H&P reviewed.    Agree with plan as outlined.  Patient describes his chest pain as constant, substernal, radiating to his back and associated with deep breath and with any range of motion of his torso.  Serial troponins negative.  CT of the chest, although optimized for PE and not for aortic pathology, did not show any sign of dissection.  He is hemodynamically stable so I find dissection highly unlikely.  Echocardiogram does not show any cause for chest pain.  Seems most likely musculoskeletal.    In regards to atrial fibrillation, this is a new diagnosis for him.  He will require anticoagulation ultimately as he has an elevated [id number] score.  Anticipate using a NOAC.  He has been started on rate control with metoprolol 50 mg 4 times daily.  I will leave him n.p.o. until he is seen by cardiology in case they feel that CHEYANNE guided cardioversion would be recommended.    His EF is moderately low at 45 to 50%.  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  I will defer to cardiology for ischemic workup recommendations.    Yaya Mir MD  1:13 PM 7/30/2021

## 2021-07-30 NOTE — ED NOTES
Bed: ED09  Expected date:   Expected time:   Means of arrival:   Comments:  [facility] 81M Chest Pain

## 2021-07-30 NOTE — PHARMACY-ADMISSION MEDICATION HISTORY
Admission medication history interview status for this patient is complete. See [facility] admission navigator for allergy information, prior to admission medications and immunization status.     Medication history interview done, indicate source(s): Patient  Medication history resources (including written lists, pill bottles, clinic record): SureScripts dispense record  Pharmacy: [facility]    Changes made to PTA medication list:  Added: desonide  Changed: he splits metformin to 500mg BID instead of 1000mg daily  Reported as Not Taking: none  Removed: none    Actions taken by pharmacist (provider contacted, etc):None     Additional medication history information:None    Medication reconciliation/reorder completed by provider prior to medication history?  N   (Y/N)         Prior to Admission medications    Medication Sig Last Dose Taking? Auth Provider   aspirin 81 MG EC tablet Take 81 mg by mouth every evening  7/29/2021 at pm Yes Reported, Patient   clopidogrel (PLAVIX) 75 MG tablet Take 1 tablet (75 mg) by mouth daily 7/29/2021 at pm Yes Vanessa Munson MD   desonide (DESOWEN) 0.05 % external ointment Apply topically 2 times daily To forehead x 2 weeks 7/29/2021 at Unknown time Yes Unknown, Entered By History   ibrutinib (IMBRUVICA) 420 MG tablet Take 1 tablet (420 mg) by mouth daily 7/29/2021 at am Yes Breana Perry MD   losartan-hydrochlorothiazide (HYZAAR) 50-12.5 MG tablet TAKE 1 TABLET BY MOUTH EVERY DAY 7/29/2021 at am Yes Oral Fisher PA-C   metFORMIN (GLUCOPHAGE-XR) 500 MG 24 hr tablet Take 2 tablets (1,000 mg) by mouth every morning  Patient taking differently: Take 500 mg by mouth 2 times daily (with meals)  7/29/2021 at pm Yes Vanessa Munson MD   Multiple Vitamins-Minerals (CENTRUM SILVER) per tablet Take 1 tablet by mouth daily 7/29/2021 at am Yes Reported, Patient   rosuvastatin (CRESTOR) 5 MG tablet Take 1 tablet (5 mg) by mouth daily 7/29/2021 at am Yes Vanessa Munson MD   [facility]  GUIDE test strip Use to test blood sugar 2 times daily or as directed. Unknown at Unknown time  Vanessa Munson MD   CONTOUR NEXT TEST test strip USE TO TEST BLOOD SUGAR 1-2 TIMES DAILY OR AS DIRECTED (ADJUSTING ORAL MEDICATIONS). Unknown at Unknown time  Elizabeth Alaniz, GISELL CNP

## 2021-07-30 NOTE — PLAN OF CARE
Admit from ED. A & Ox4, nothing infusing. A fib rate 110-130s. Bleongings: chemo medicaiton from home, clothes (robe and slippers), ipad w/ , bilateral hearing aids w/  and book     ICU End of Shift Summary.  For vital signs and complete assessments, please see documentation flowsheets.     Pertinent assessments: A & Ox4, room air. Continues to have CP rating 1-3, treated with tylenol.   Major Shift Events: Continues to have CP. Afibe 100-130s.  Denies SOB. Heparin gtt started plan to stop at 2200 because oral meds started. Plan for possible CHEYANNE intervention Monday.   Plan (Upcoming Events): Continue to monitor. M/S tele overflow.  Cardiac interventions Monday.   Discharge/Transfer Needs: TBD     Bedside Shift Report Completed :  yes   Bedside Safety Check Completed: yes

## 2021-07-30 NOTE — CONSULTS
Anticoagulation coverage check.  Patient has Medicare D through [facility].    Xarelto/Eliquis:  $25/mo.     Vishtoven (warfarin): $3.30/mo.      STEVE Stock, Pharmacy Technician/Liaison, Discharge Pharmacy, [phone]

## 2021-07-30 NOTE — ED TRIAGE NOTES
"Patient arrived from home via EMS with complaints of chest pain. Patient has had chest pain since Monday intermittently and said his pulse \"just didn't feel right\" and gets worse with breathing and activity. Patient has a hx of leukemia. [facility] arrival patient was found to be in Afib RVR with  HR in the 150s, EMS gave him 10mg verapamil and HR went down to 120s. ,  systolic. GCS 15.   "

## 2021-07-30 NOTE — CONSULTS
Cardiology Consultation:    Austin Garza MRN#: [id number]   YOB: 1940 Age: 81 year old     Date of Admission: 7/30/2021  Consult indication: atrial fibrillation          Assessment and Plan / Recommendations:      # Atrial fibrillation with RVR, chronicity/duration unknown, symptomatic pleuritic chest discomfort.  Overall clinical presentation does not seem consistent with an acute coronary syndrome.  Troponin normal x3.  [id number] 5.  # HFrEF LVEF 45 to 50%.  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  No prior ischemic evaluation.  Does have significant risk factors for coronary artery disease.  # HTN.  BP elevated.   # HL  # CLL DOAC  # AAA s/p repair   # DM2    Discussed options for further evaluation and management with the patient and his wife at length.  Discussed the risks/benefits of each option at length.  We will proceed with up titration of metoprolol for rate control, possibly may require CHEYANNE/DCCV on Monday if unsuccessful.  Continue clopidogrel 75 mg daily, will start heparin GTT, pharmacy liaison consultation and switch to DOAC as financially appropriate.  Start rosuvastatin 20 mg nightly.  Hold losartan-hydrochlorothiazide to allow for up titration of metoprolol.  Will consider an ischemic evaluation inpatient versus outpatient once rate is controlled/converted to normal rhythm.  Cardiology will follow.    Thank you for allowing our team to participate in the care of Austin Garza. Please do not hesitate to page me with any questions or concerns.    Raheel Schaffer MD, [facility]  Cardiology  Text Page   July 30, 2021    Voice recognition software utilized. Although reviewed after completion, some word and grammatical errors may be present.           History of Present Illness:     I had the opportunity to see patient Austin Garza at [facility] for a cardiology consultation.    As you know, patient is a pleasant 81-year-old male with a past medical history  significant for AAA status post repair, hypertension, diabetes, CLL (on Plavix and ibrutinib), who was admitted on 7/30/2021 with chest discomfort, found to be in atrial fibrillation with RVR.    Patient reports that over the past week, he has had intermittent episodes of chest discomfort described as a tightness in his chest, worse in the morning when he wakes up, worse with deep inspiration.  He does exercise routinely, several times a week, and last engaged in aerobic exercise on Wednesday.  He denies any associated chest pain or chest discomfort with exercise, no recurrence of the tightness that he had been experiencing overnight.  He denies any palpitations, dizziness, lightheadedness.  Last night, he noted an irregular heartbeat, and so presented to the emergency department.    In the ED, initial blood pressure is 167/93 mmHg, heart rate 123 bpm, ECG demonstrated atrial fibrillation with RVR at approximately 120 bpm, nonspecific ST segment changes in leads III and aVF, no ST segment elevation or depression.  Initial labs were notable for potassium 3.9, creatinine 1.58, troponin normal x3.    TTE 7/30/2021 demonstrates LVEF 45 to 50%, possible mild basal inferolateral, distal inferior hypokinesis, probably mildly reduced RV function.         Past Medical History:   I have reviewed this patient's past medical history    Past Medical History:   Diagnosis Date     AAA (abdominal aortic aneurysm)      BCC (basal cell carcinoma of skin) - face      CLL (chronic lymphocytic leukemia)      Diaphragmatic hernia      Diverticulitis of colon      Esophageal reflux      Essential hypertension      Hyperlipidemia      Irritable bowel syndrome      Macular degeneration (senile) of retina      Squamous Cell Carcinoma, Back 1988     Type 2 diabetes mellitus              Past Surgical History:   I have reviewed this patient's past surgical history   Past Surgical History:   Procedure Laterality Date     APPENDECTOMY OPEN  1966      BONE MARROW BIOPSY, BONE SPECIMEN, NEEDLE/TROCAR N/A 2017    Procedure: BIOPSY BONE MARROW;  BONE MARROW BIOPSY;  Surgeon: Shady Garcia MD;  Location:  GI     COLONOSCOPY       ENDOVASCULAR REPAIR ANEURYSM ABDOMINAL AORTA N/A 2017    Procedure: ENDOVASCULAR REPAIR ANEURYSM ABDOMINAL AORTA;  ENDOVASCULAR REPAIR ANEURYSM ABDOMINAL AORTA;  Surgeon: Milton Cazares MD;  Location:  OR     Fistulotomy with marsupialization for repair of fisture in ano       IR ABDOMINAL AORTOGRAM  3/11/2019     IR VISCERAL EMBOLIZATION  2019     Multiple skin tags - resection       Squamous cell skin cancer resection - back               Social History:   I have reviewed this patient's social history  Social History     Tobacco Use     Smoking status: Former Smoker     Packs/day: 0.50     Years: 20.00     Pack years: 10.00     Quit date: 1975     Years since quittin.6     Smokeless tobacco: Former User   Substance Use Topics     Alcohol use: Yes     Alcohol/week: 10.0 - 14.0 standard drinks     Types: 10 - 14 Standard drinks or equivalent per week     Comment: 2 drinks a day/wine             Family History:   I have reviewed this patient's family history  Family History   Problem Relation Age of Onset     Cerebrovascular Disease Father         x 2     Hypertension Mother      C.A.D. Mother      Cancer Mother         skin     Eye Disorder Mother         glaucoma     Prostate Cancer No family hx of      Cancer - colorectal No family hx of      Glaucoma No family hx of      Macular Degeneration No family hx of              Allergies:   I have reviewed this patient's allergy history  Allergies   Allergen Reactions     Ace Inhibitors      cough             Medications reviewed:   Prior to admission medications:  Prior to Admission medications    Medication Sig Start Date End Date Taking? Authorizing Provider   aspirin 81 MG EC tablet Take 81 mg by mouth every evening    Yes  Reported, Patient   clopidogrel (PLAVIX) 75 MG tablet Take 1 tablet (75 mg) by mouth daily 6/10/21  Yes Vanessa Munson MD   desonide (DESOWEN) 0.05 % external ointment Apply topically 2 times daily To forehead x 2 weeks   Yes Unknown, Entered By History   ibrutinib (IMBRUVICA) 420 MG tablet Take 1 tablet (420 mg) by mouth daily 5/3/21  Yes Breana Perry MD   losartan-hydrochlorothiazide (HYZAAR) 50-12.5 MG tablet TAKE 1 TABLET BY MOUTH EVERY DAY 12/30/20  Yes Oral Fisher PA-C   metFORMIN (GLUCOPHAGE-XR) 500 MG 24 hr tablet Take 2 tablets (1,000 mg) by mouth every morning  Patient taking differently: Take 500 mg by mouth 2 times daily (with meals)  7/12/21  Yes Vanessa Munson MD   Multiple Vitamins-Minerals (CENTRUM SILVER) per tablet Take 1 tablet by mouth daily   Yes Reported, Patient   rosuvastatin (CRESTOR) 5 MG tablet Take 1 tablet (5 mg) by mouth daily 7/12/21  Yes Vanessa Munson MD   ACCU-CHEK GUIDE test strip Use to test blood sugar 2 times daily or as directed. 7/15/21   Vanessa Munson MD   CONTOUR NEXT TEST test strip USE TO TEST BLOOD SUGAR 1-2 TIMES DAILY OR AS DIRECTED (ADJUSTING ORAL MEDICATIONS). 11/16/20   Elizabeth Alaniz, APRN CNP      Current medications:  Current Facility-Administered Medications Ordered in Epic   Medication Dose Route Frequency Last Rate Last Admin     acetaminophen (TYLENOL) tablet 650 mg  650 mg Oral Q6H PRN   650 mg at 07/30/21 1023    Or     acetaminophen (TYLENOL) Suppository 650 mg  650 mg Rectal Q6H PRN         [START ON 7/31/2021] clopidogrel (PLAVIX) tablet 75 mg  75 mg Oral Daily         glucose gel 15-30 g  15-30 g Oral Q15 Min PRN        Or     dextrose 50 % injection 25-50 mL  25-50 mL Intravenous Q15 Min PRN        Or     glucagon injection 1 mg  1 mg Subcutaneous Q15 Min PRN         heparin 25,000 units in 0.45% NaCl 250 mL ANTICOAGULANT infusion  0-5,000 Units/hr Intravenous Continuous 11.5 mL/hr at 07/30/21 1458 1,150  Units/hr at 21 1458     ibrutinib (IMBRUVICA) tablet 420 mg  420 mg Oral Daily         insulin aspart (NovoLOG) injection (RAPID ACTING)  1-6 Units Subcutaneous Q4H   1 Units at 21 1322     lidocaine (LMX4) cream   Topical Q1H PRN         lidocaine 1 % 0.1-1 mL  0.1-1 mL Other Q1H PRN         melatonin tablet 1 mg  1 mg Oral At Bedtime PRN         metoprolol tartrate (LOPRESSOR) tablet 50 mg  50 mg Oral 4x Daily   50 mg at 21 1411     ondansetron (ZOFRAN-ODT) ODT tab 4 mg  4 mg Oral Q6H PRN        Or     ondansetron (ZOFRAN) injection 4 mg  4 mg Intravenous Q6H PRN         rosuvastatin (CRESTOR) tablet 20 mg  20 mg Oral At Bedtime         sodium chloride (PF) 0.9% PF flush 3 mL  3 mL Intracatheter Q8H   3 mL at 21 0919     sodium chloride (PF) 0.9% PF flush 3 mL  3 mL Intracatheter q1 min prn         No current [facility]-ordered outpatient medications on file.             Review of Systems:   A complete review of systems was performed and was negative except as mentioned in the HPI.          Physical Exam:   Vital signs were personally reviewed:  Temperatures:  Current - Temp: 98  F (36.7  C); Max - Temp  Av.1  F (36.7  C)  Min: 97.7  F (36.5  C)  Max: 98.5  F (36.9  C)  Respiration range: Resp  Av.4  Min: 14  Max: 18  Pulse range: Pulse  Av.9  Min: 103  Max: 151  Blood pressure range: Systolic (24hrs), Av , Min:131 , Max:167   ; Diastolic (24hrs), Av, Min:74, Max:114    Pulse oximetry range: SpO2  Av.4 %  Min: 93 %  Max: 100 %    Intake/Output Summary (Last 24 hours) at 2021 1501  Last data filed at 2021 1000  Gross per 24 hour   Intake 30 ml   Output 250 ml   Net -220 ml     208 lbs 8.88 oz  Body mass index is 30.8 kg/m .   Body surface area is 2.15 meters squared.    Constitutional: appears stated age, in no apparent distress, appears to be well nourished  Eyes: sclera anicteric, conjunctiva normal, no lesions on eyelids or lashes  ENT: normocephalic,  without obvious abnormality, atraumatic, external ears without lesions,   Pulmonary: clear to auscultation bilaterally, no wheezes, no rales, no increased work of breathing  Cardiovascular: regular rate, irregularly irregular rhythm, distant heart sounds but no obvious murmurs, no lower extremity edema  Gastrointestinal: abdominal exam benign, non-tender, no rigidity, no guarding  Neurologic: awake, alert, face symmetrical, moves all extremities  Skin: no abnormal rashes or lesions on limited exam, nails normal without discoloration or clubbing, no jaundice  Psychiatric: affect is normal, answers questions appropriately, oriented to self and place         Laboratory tests:   Laboratory tests personally reviewed:   CMP  Recent Labs   Lab 07/30/21  1321 07/30/21  1158 07/30/21  0936 07/30/21  0750 07/30/21  0326   NA  --   --   --   --  135   POTASSIUM  --   --   --   --  3.9   CHLORIDE  --   --   --   --  105   CO2  --   --   --   --  24   ANIONGAP  --   --   --   --  6   * 182* 158* 171* 198*   BUN  --   --   --   --  29   CR  --   --   --   --  1.58*   GFRESTIMATED  --   --   --   --  41*   MAGDALENO  --   --   --   --  8.8     CBC  Recent Labs   Lab 07/30/21  0808 07/30/21  0326   WBC 10.7 11.6*   RBC 3.86* 3.96*   HGB 12.5* 12.8*   HCT 37.8* 38.6*   MCV 98 98   MCH 32.4 32.3   MCHC 33.1 33.2   RDW 13.8 13.4   * 102*     INRNo lab results found in last 7 days.  Lab Results   Component Value Date    TROPI <0.015 08/23/2020    TROPONIN <0.015 07/30/2021    TROPONIN <0.015 07/30/2021    TROPONIN <0.015 07/30/2021     Recent Labs   Lab Test 04/05/21  0913 01/15/21  1527 10/23/15  0903 08/05/14  0755   CHOL 166 109 109 131   HDL 57 51 43 47   LDL 96 27 54 63   TRIG 64 157* 62 105   CHOLHDLRATIO  --   --  2.5 2.8     Lab Results   Component Value Date    A1C 7.0 07/30/2021    A1C 5.9 04/05/2021    A1C 6.7 01/15/2021    A1C 5.6 08/23/2020    A1C 5.5 07/14/2020    A1C 6.3 12/31/2019     TSH   Date Value Ref Range  Status   2019 3.57 0.40 - 4.00 mU/L Final            Imaging and Additional Data:   Additional data personally reviewed:  Recent Results (from the past 4320 hour(s))   Echocardiogram Complete   Result Value    LVEF  45-50%    [facility]    [id number]  [id number]  [id number]  [id number]^PATRICIA^KULWANT^LESLIE     [facility]  Echocardiography Laboratory  [address] [place] [address]     Name: MARK MORA  MRN: [id number]  : 1940  Study Date: 2021 08:53 AM  Age: 81 yrs  Gender: Male  Patient Location: [facility]  Reason For Study: Atrial Fibrillation  Ordering Physician: KULWANT GOMEZ  Referring Physician: Vanessa Munson  Performed By: Radha Spain     BSA: 2.1 m2  Height: 69 in  Weight: 212 lb  HR: 109  BP: 151/91 mmHg  ______________________________________________________________________________  Procedure  Complete Portable Echo Adult.  ______________________________________________________________________________  Interpretation Summary     The visual ejection fraction is 45-50%.  Left ventricular systolic function is mildly reduced.  With rapid atrial fibrillation, the visual approximation of left ventricular  systolic function may be falsely decreased.  There are regional wall motion abnormalities as specified.  Contrast would increase the accuracy of regional wall motion analysis.  Mild aortic root dilatation.  The ascending aorta is Mildly dilated.  The aortic arch is Mildly dilated.  The right ventricle was not well seen in the apical acoustic window. In the  subcostal views, the right ventricular systolic function appears to be mildly  reduced.  There is trace aortic regurgitation.  The rhythm was rapid atrial fibrillation.  The study was technically difficult.  ______________________________________________________________________________  Left Ventricle  The left ventricle is normal in size. There is mild concentric left  ventricular hypertrophy. A sigmoid septum is  present. Diastolic function not  assessed due to atrial fibrillation. With rapid atrial fibrillation, the  visual approximation of left ventricular systolic function may be falsely  decreased. The visual ejection fraction is 45-50%. Left ventricular systolic  function is mildly reduced. In some views the mid to basal inferolateral wall  and the distal inferior wall appear mildly to moderately hypokinetic. There  are regional wall motion abnormalities as specified.     Right Ventricle  The right ventricle is normal size. Right ventricular function cannot be  assessed due to poor image quality. The right ventricle was not well seen in  the apical acoustic window. In the subcostal views, the right ventricular  systolic function appears to be mildly reduced.     Atria  Normal left atrial size. Right atrial size is normal. There is no color  Doppler evidence of an atrial shunt.     Mitral Valve  There is mild mitral annular calcification. There is trace mitral  regurgitation.     Tricuspid Valve  There is trace tricuspid regurgitation. Right ventricular systolic pressure  could not be approximated due to inadequate tricuspid regurgitation.     Aortic Valve  The aortic valve is trileaflet. There is moderate trileaflet aortic sclerosis.  There is trace aortic regurgitation. No hemodynamically significant valvular  aortic stenosis.     Pulmonic Valve  There is no pulmonic valvular regurgitation. Normal pulmonic valve velocity.     Vessels  Mild aortic root dilatation. The ascending aorta is Mildly dilated. The aortic  arch is Mildly dilated. IVC diameter and respiratory changes fall into an  intermediate range suggesting an RA pressure of 8 mmHg.     Pericardium  There is no pericardial effusion.     Rhythm  The rhythm was rapid atrial fibrillation.  ______________________________________________________________________________  MMode/2D Measurements & Calculations  IVSd: 1.8 cm     LVIDd: 3.7 cm  LVIDs: 3.1 cm  LVPWd: 1.2  cm  FS: 15.3 %  LV mass(C)d: 213.0 grams  LV mass(C)dI: 100.6 grams/m2  Ao root diam: 4.1 cm  LA dimension: 3.6 cm  asc Aorta Diam: 4.3 cm  Ao Arch Diam (Proximal trans.): 3.8 cm  LA/Ao: 0.87  LVOT diam: 2.3 cm  LVOT area: 4.3 cm2  LA Volume (BP): 54.4 ml  LA Volume Index (BP): 25.7 ml/m2  RWT: 0.66     Doppler Measurements & Calculations  LV V1 max PG: 3.1 mmHg  LV V1 max: 86.8 cm/sec  LV V1 VTI: 12.2 cm  SV(LVOT): 52.6 ml  SI(LVOT): 24.8 ml/m2  PA acc time: 0.10 sec     ______________________________________________________________________________  Report approved by: Sharonda Humphreys 07/30/2021 09:40 AM

## 2021-07-31 LAB
ANION GAP SERPL CALCULATED.3IONS-SCNC: 5 MMOL/L (ref 3–14)
BUN SERPL-MCNC: 32 MG/DL (ref 7–30)
CALCIUM SERPL-MCNC: 8.8 MG/DL (ref 8.5–10.1)
CHLORIDE BLD-SCNC: 105 MMOL/L (ref 94–109)
CO2 SERPL-SCNC: 27 MMOL/L (ref 20–32)
CREAT SERPL-MCNC: 1.69 MG/DL (ref 0.66–1.25)
GFR SERPL CREATININE-BSD FRML MDRD: 38 ML/MIN/1.73M2
GLUCOSE BLD-MCNC: 153 MG/DL (ref 70–99)
GLUCOSE BLDC GLUCOMTR-MCNC: 132 MG/DL (ref 70–99)
GLUCOSE BLDC GLUCOMTR-MCNC: 164 MG/DL (ref 70–99)
GLUCOSE BLDC GLUCOMTR-MCNC: 176 MG/DL (ref 70–99)
GLUCOSE BLDC GLUCOMTR-MCNC: 223 MG/DL (ref 70–99)
GLUCOSE BLDC GLUCOMTR-MCNC: 259 MG/DL (ref 70–99)
POTASSIUM BLD-SCNC: 4.1 MMOL/L (ref 3.4–5.3)
SODIUM SERPL-SCNC: 137 MMOL/L (ref 133–144)

## 2021-07-31 PROCEDURE — 120N000001 HC R&B MED SURG/OB

## 2021-07-31 PROCEDURE — 250N000013 HC RX MED GY IP 250 OP 250 PS 637: Performed by: INTERNAL MEDICINE

## 2021-07-31 PROCEDURE — 99233 SBSQ HOSP IP/OBS HIGH 50: CPT | Performed by: INTERNAL MEDICINE

## 2021-07-31 PROCEDURE — 36415 COLL VENOUS BLD VENIPUNCTURE: CPT | Performed by: INTERNAL MEDICINE

## 2021-07-31 PROCEDURE — 82374 ASSAY BLOOD CARBON DIOXIDE: CPT | Performed by: INTERNAL MEDICINE

## 2021-07-31 PROCEDURE — 99232 SBSQ HOSP IP/OBS MODERATE 35: CPT | Performed by: INTERNAL MEDICINE

## 2021-07-31 RX ADMIN — ROSUVASTATIN CALCIUM 20 MG: 20 TABLET, FILM COATED ORAL at 20:38

## 2021-07-31 RX ADMIN — CLOPIDOGREL BISULFATE 75 MG: 75 TABLET ORAL at 08:41

## 2021-07-31 RX ADMIN — APIXABAN 5 MG: 5 TABLET, FILM COATED ORAL at 08:41

## 2021-07-31 RX ADMIN — METOPROLOL TARTRATE 75 MG: 50 TABLET, FILM COATED ORAL at 20:36

## 2021-07-31 RX ADMIN — APIXABAN 5 MG: 5 TABLET, FILM COATED ORAL at 20:36

## 2021-07-31 RX ADMIN — METOPROLOL TARTRATE 50 MG: 50 TABLET, FILM COATED ORAL at 08:41

## 2021-07-31 ASSESSMENT — ACTIVITIES OF DAILY LIVING (ADL)
ADLS_ACUITY_SCORE: 13

## 2021-07-31 NOTE — PLAN OF CARE
BP soft otherwise stable on RA. Tele: Afib CVR-RVR, 110's at times. , Insulin given per sliding scale. Tolerating Cardiac diet. Independent in the room. Hem/Onc consulted. Cardiology following. Plan for CHEYANNE/Cardiovert Monday. Will continue POC.

## 2021-07-31 NOTE — PROGRESS NOTES
[facility]    Medicine Progress Note - Hospitalist Service       Date of Admission:  7/30/2021  Length of stay: 1 days    Assessment & Plan   Austin Garza is a 11-year-old male with a history of endovascular AAA repair on Plavix, CKD stage III, HTN, HLD and DM 2 who is admitted to the hospitalist service with new onset atrial fibrillation with rapid ventricular response as well as new mild cardiomyopathy.    Patient presented to the ED complaining of chest pain actually present for 5 days prior to admission describes a pleuritic sensation.  Was incidentally found to be in A. fib with RVR and admitted to the hospital.    Chest pain  - Seems musculoskeletal versus costochondritis.  Described as worse with inspiration, bending and twisting.  Was not related to exertion.  Serial troponins have been negative.  CT PE protocol negative as well.  No sign of ACS.    Atrial fibrillation with rapid ventricular response  - Found to be in A. fib with RVR in the ED.  This is a new diagnosis for the patient.  Other than the chest pain he did not seem to be overtly symptomatic and I doubt the chest pain was causing this.  - He has been started on apixaban for stroke prophylaxis.  - Started on metoprolol which has been adjusted to 75 mg twice daily for rate control.  - Current plan is for CHEYANNE guided cardioversion on Monday, 8/2.  - It is noted that the patient's oral chemotherapy medication ibrutinib has a known adverse effect of atrial fibrillation.  For that reason, this medication will be held and oncology consulted.    Acute systolic heart failure  - Patient's EF found to be 45 to 50% on echo.  No diagnosis of heart failure previously.  Could be tachycardia induced.  But patient has strong risk factors for coronary disease, including AAA, HTN, HLD and DM2.  He has severe coronary artery calcifications on his CT.  - Does not appear overtly volume overloaded so we will not add Lasix.  - Defer to cardiology timing  "and decision regarding ischemic work-up.    CLL  - Followed by [place] oncology, Dr. Perry.  - Has been on ibrutinib since May 2020.  As noted, this can cause arrhythmias.  Consult oncology to see if this new onset atrial fibrillation means that ibrutinib needs to be discontinued.    Abdominal aortic aneurysm  - Incidentally found during his CLL evaluation.  Underwent EVAR on 12/4/2017.  Underwent percutaneous aneurysm sac puncture and endoleak embolization by IR in May 2019.  - On Plavix prior to admission.    - Defer to cardiology to discuss with vascular surgery whether he needs to be on Plavix still on that he is on apixaban.    DM2  - A1c 7.2%.  Only on Metformin. Holding that while admitted, resume on discharge and use sliding scale insulin instead.    HTN  - Prior to admission on losartan and HCTZ combination pill.  Holding that while we work on rate control.    Diet: Cardiac  DVT Prophylaxis: Apixaban  Malnutrition: No  Siddiqui Catheter: No  Code Status: Full Code     Disposition Plan   Expected discharge:   At least two more nights in the hospital to get CHEYANNE guided cardioversion.    Yaya Mir MD  Hospitalist Service  [facility]  ______________________________________________________________________    Interval History   Patient has no complaints and states he feels fine.  No chest pain or shortness of breath.    Data reviewed today: I reviewed all medications, new labs and imaging results over the last 24 hours.     Physical Exam   BP 91/75 (BP Location: Left arm)   Pulse 107   Temp 98.4  F (36.9  C) (Oral)   Resp 22   Ht 1.753 m (5' 9\")   Wt 94.6 kg (208 lb 8.9 oz)   SpO2 98%   BMI 30.80 kg/m    208 lbs 8.88 oz       General: Looks well.    HEENT: No scleral icterus. Oropharynx moist.     Neck: Supple. Normal range of motion.     Pulmonary: Normal work of breathing. Clear to auscultation bilaterally.    Cardiovascular: Rapid and irregular rhythm.    Abdomen: Soft and " non-tender.    Extremities: No peripheral edema. No clubbing or cyanosis.     Neurologic: Awake, alert, appropriate.    Skin: Warm and dry.    Psychiatric: Normal affect and mood.     Data    Recent Labs   Lab 07/31/21  1153 07/31/21  0750 07/31/21  0546 07/30/21  1533 07/30/21  0808 07/30/21  0729 07/30/21  0326 07/30/21  0326   WBC  --   --   --   --  10.7  --   --  11.6*   HGB  --   --   --   --  12.5*  --   --  12.8*   MCV  --   --   --   --  98  --   --  98   PLT  --   --   --   --  103*  --   --  102*   NA  --   --  137  --   --   --   --  135   POTASSIUM  --   --  4.1  --   --   --   --  3.9   CHLORIDE  --   --  105  --   --   --   --  105   CO2  --   --  27  --   --   --   --  24   BUN  --   --  32*  --   --   --   --  29   CR  --   --  1.69*  --   --   --   --  1.58*   ANIONGAP  --   --  5  --   --   --   --  6   MAGDALENO  --   --  8.8  --   --   --   --  8.8   * 176* 153*  --   --   --    < > 198*   TROPONIN  --   --   --  <0.015 <0.015 <0.015  --  <0.015    < > = values in this interval not displayed.     No results found for this or any previous visit (from the past 24 hour(s)).    Medications      Current Facility-Administered Medications   Medication Dose Route Frequency     apixaban ANTICOAGULANT  5 mg Oral BID     clopidogrel  75 mg Oral Daily     [Held by provider] ibrutinib  420 mg Oral Daily     insulin aspart  1-7 Units Subcutaneous TID AC     insulin aspart  1-5 Units Subcutaneous At Bedtime     metoprolol tartrate  75 mg Oral BID     rosuvastatin  20 mg Oral At Bedtime     sodium chloride (PF)  3 mL Intracatheter Q8H

## 2021-07-31 NOTE — PROGRESS NOTES
Assessment and Plan:     This a very pleasant 81-year-old gentleman with a history of abdominal AAA repair on chronic Plavix.  He has a history of CLL.  Counts are normal.  Patient was admitted with atypical nonanginal sounding chest discomfort.  Pain was positional and pleuritic.  It would come on only in the morning.  This happened for the last 4 to 5 days.  This would get better through the day as he would exert himself.  Raising his arms would make the pain better.  In any case he measured his pulse and it was elevated and as such he came to the ER.  He was noted to be in atrial fibrillation with RVR.  He was seen by cardiology team in consultation.  Echocardiogram showed EF of 45%.  Patient was started on rate control strategy and anticoagulation.  I am following up over the weekend.    1. New atrial fibrillation with rapid ventricular response  2. Abdominal aortic aneurysm repair  3. New mild cardiomyopathy with EF of 45 to 50% possible tachycardia induced versus ischemic heart disease  4. Severe coronary calcifications on chest CT  5. History of CLL  6. Chronic kidney disease  7. Hypertension diabetes hyperlipidemia    Plan and recommendations  1. The patient has been started on rate control strategy by my partner yesterday.  Agree with the plan.  He is scheduled for CHEYANNE cardioversion on Monday.  He has been started on anticoagulation.  Patient denies any bleeding problems.  2. The patient's chest pain has resolved.  His history is very consistent and says that this was pleuritic and positional chest pain.  He does not have clinical angina.  3. Recommend repeating echocardiogram as an outpatient in a couple of weeks after restoration of normal rhythm.  4. Recommend outpatient nuclear stress test given chest CT findings are severe coronary calcifications and is atypical chest pain and possible wall motion of the LAD reported on echo.  If this is abnormal, that after anticoagulation can be safely stopped  following cardioversion, invasive coronary work-up can be considered.  5. Check with vascular surgery if he needs to still be on Plavix now since he has been started on Eliquis for atrial fibrillation.  6. For today continue rate control strategy keep patient on telemetry we will follow along tomorrow morning.  Call us with any change in clinical status.    Jeremy Bhatti MD        Interval History:   No overnight issues.  The patient said he feels significantly better.  Heart rate is better.          Medications:       apixaban ANTICOAGULANT  5 mg Oral BID     clopidogrel  75 mg Oral Daily     ibrutinib  420 mg Oral Daily     insulin aspart  1-7 Units Subcutaneous TID AC     insulin aspart  1-5 Units Subcutaneous At Bedtime     metoprolol tartrate  50 mg Oral 4x Daily     rosuvastatin  20 mg Oral At Bedtime     sodium chloride (PF)  3 mL Intracatheter Q8H            Physical Exam:     Patient Vitals for the past 24 hrs:   BP Temp Temp src Pulse Resp SpO2   07/31/21 0800 119/85 98.4  F (36.9  C) Oral (!) 121 22 95 %   07/31/21 0600 -- -- -- 100 -- 95 %   07/31/21 0500 -- -- -- 113 -- 99 %   07/31/21 0400 109/80 -- -- 102 -- 97 %   07/31/21 0000 122/84 -- -- 89 -- --   07/30/21 2300 122/80 97.8  F (36.6  C) Oral 101 -- 95 %   07/30/21 2259 122/80 -- -- 112 -- --   07/30/21 2000 132/78 -- -- 96 -- 97 %   07/30/21 1959 132/78 98.6  F (37  C) Oral 101 -- 99 %   07/30/21 1900 -- -- -- (!) 124 -- --   07/30/21 1844 131/79 -- -- 115 -- --   07/30/21 1800 -- -- -- 118 16 98 %   07/30/21 1700 -- -- -- 106 16 100 %   07/30/21 1612 107/78 98.2  F (36.8  C) Oral 107 16 97 %   07/30/21 1500 -- -- -- (!) 131 16 --   07/30/21 1411 135/74 -- -- 118 -- --   07/30/21 1409 135/74 -- -- 118 -- --   07/30/21 1400 -- -- -- (!) 129 16 --   07/30/21 1300 -- -- -- 117 16 100 %   07/30/21 1200 137/76 98  F (36.7  C) Oral 113 14 100 %   07/30/21 1100 -- -- -- 109 16 98 %     Vitals:    07/30/21 0900   Weight: 94.6 kg (208 lb 8.9 oz)          Intake/Output Summary (Last 24 hours) at 7/31/2021 1016  Last data filed at 7/31/2021 0900  Gross per 24 hour   Intake 490 ml   Output 225 ml   Net 265 ml       07/26 0700 - 07/31 0659  In: 270 [P.O.:270]  Out: 475 [Urine:475]  Net: -205    Exam:  GENERAL APPEARANCE ADULT: Alert, in no acute distress  RESP: lungs clear to auscultation   CV: irregularly irregular rhythm  ABDOMEN: no guarding or rebound  EXTREMITIES: No edema         Data:   LABS (Last four results)  CMP  Recent Labs   Lab 07/31/21  0750 07/31/21  0546 07/31/21  0129 07/30/21  2257 07/30/21  0326   NA  --  137  --   --  135   POTASSIUM  --  4.1  --   --  3.9   CHLORIDE  --  105  --   --  105   CO2  --  27  --   --  24   ANIONGAP  --  5  --   --  6   * 153* 164* 158* 198*   BUN  --  32*  --   --  29   CR  --  1.69*  --   --  1.58*   GFRESTIMATED  --  38*  --   --  41*   MAGDALENO  --  8.8  --   --  8.8     CBC  Recent Labs   Lab 07/30/21  0808 07/30/21  0326   WBC 10.7 11.6*   RBC 3.86* 3.96*   HGB 12.5* 12.8*   HCT 37.8* 38.6*   MCV 98 98   MCH 32.4 32.3   MCHC 33.1 33.2   RDW 13.8 13.4   * 102*     INRNo lab results found in last 7 days.  TROPONINS   Lab Results   Component Value Date    TROPI <0.015 08/23/2020    TROPONIN <0.015 07/30/2021    TROPONIN <0.015 07/30/2021    TROPONIN <0.015 07/30/2021    TROPONIN <0.015 07/30/2021                                                                                                               Jeremy Bhatti MD

## 2021-07-31 NOTE — PROGRESS NOTES
VSS.  AFIB HR 's.  OOB in chair.  Ambulated steady gait in room.  Tolerating diet.  Denies Pain.  Cooperative with cares.  Ready for transfer to [address] [place] [address].  Al belnogings including hearing aids, I-pad, and glasses with patient.  Had small BM

## 2021-07-31 NOTE — PLAN OF CARE
ICU End of Shift Summary.  For vital signs and complete assessments, please see documentation flowsheets.     Pertinent assessments: Afebrile. Alert and oriented x 4. Ambulating with SBA to bathroom. Denies pain. Slept throughout much of night. Voided x 2. Tele Afib 's-110's. Hep gtt stopped and Eliquis started tonight.  Major Shift Events: Heparin gtt stopped, Eliquis started.  Plan (Upcoming Events): transfer to medical  tele floor when bed available. Possible CHEYANNE Monday.  Discharge/Transfer Needs:     Bedside Shift Report Completed   Bedside Safety Check Completed

## 2021-08-01 LAB
GLUCOSE BLDC GLUCOMTR-MCNC: 139 MG/DL (ref 70–99)
GLUCOSE BLDC GLUCOMTR-MCNC: 175 MG/DL (ref 70–99)
GLUCOSE BLDC GLUCOMTR-MCNC: 187 MG/DL (ref 70–99)
GLUCOSE BLDC GLUCOMTR-MCNC: 202 MG/DL (ref 70–99)
GLUCOSE BLDC GLUCOMTR-MCNC: 262 MG/DL (ref 70–99)
MAGNESIUM SERPL-MCNC: 2.1 MG/DL (ref 1.6–2.3)
POTASSIUM BLD-SCNC: 4.4 MMOL/L (ref 3.4–5.3)

## 2021-08-01 PROCEDURE — 36415 COLL VENOUS BLD VENIPUNCTURE: CPT | Performed by: INTERNAL MEDICINE

## 2021-08-01 PROCEDURE — 250N000013 HC RX MED GY IP 250 OP 250 PS 637: Performed by: INTERNAL MEDICINE

## 2021-08-01 PROCEDURE — 120N000001 HC R&B MED SURG/OB

## 2021-08-01 PROCEDURE — 99232 SBSQ HOSP IP/OBS MODERATE 35: CPT | Mod: 25 | Performed by: INTERNAL MEDICINE

## 2021-08-01 PROCEDURE — 83735 ASSAY OF MAGNESIUM: CPT | Performed by: INTERNAL MEDICINE

## 2021-08-01 PROCEDURE — 99223 1ST HOSP IP/OBS HIGH 75: CPT | Performed by: INTERNAL MEDICINE

## 2021-08-01 PROCEDURE — 84132 ASSAY OF SERUM POTASSIUM: CPT | Performed by: INTERNAL MEDICINE

## 2021-08-01 PROCEDURE — 99233 SBSQ HOSP IP/OBS HIGH 50: CPT | Performed by: INTERNAL MEDICINE

## 2021-08-01 RX ORDER — FUROSEMIDE 20 MG
20 TABLET ORAL DAILY
Status: DISCONTINUED | OUTPATIENT
Start: 2021-08-01 | End: 2021-08-02

## 2021-08-01 RX ORDER — METOPROLOL TARTRATE 25 MG/1
25 TABLET, FILM COATED ORAL ONCE
Status: COMPLETED | OUTPATIENT
Start: 2021-08-01 | End: 2021-08-01

## 2021-08-01 RX ORDER — METOPROLOL TARTRATE 100 MG
100 TABLET ORAL 2 TIMES DAILY
Status: DISCONTINUED | OUTPATIENT
Start: 2021-08-01 | End: 2021-08-02

## 2021-08-01 RX ADMIN — ROSUVASTATIN CALCIUM 20 MG: 20 TABLET, FILM COATED ORAL at 21:21

## 2021-08-01 RX ADMIN — METOPROLOL TARTRATE 100 MG: 100 TABLET, FILM COATED ORAL at 21:21

## 2021-08-01 RX ADMIN — FUROSEMIDE 20 MG: 20 TABLET ORAL at 09:30

## 2021-08-01 RX ADMIN — METOPROLOL TARTRATE 25 MG: 25 TABLET, FILM COATED ORAL at 09:32

## 2021-08-01 RX ADMIN — APIXABAN 5 MG: 5 TABLET, FILM COATED ORAL at 21:21

## 2021-08-01 RX ADMIN — METOPROLOL TARTRATE 75 MG: 50 TABLET, FILM COATED ORAL at 08:00

## 2021-08-01 RX ADMIN — APIXABAN 5 MG: 5 TABLET, FILM COATED ORAL at 08:00

## 2021-08-01 RX ADMIN — CLOPIDOGREL BISULFATE 75 MG: 75 TABLET ORAL at 08:00

## 2021-08-01 ASSESSMENT — ACTIVITIES OF DAILY LIVING (ADL)
ADLS_ACUITY_SCORE: 13

## 2021-08-01 NOTE — PROGRESS NOTES
Assessment and Plan:     This a very pleasant 81-year-old gentleman with a history of abdominal AAA repair on chronic Plavix.  He has a history of CLL.  Counts are normal.  Patient was admitted with atypical nonanginal sounding chest discomfort.  Pain was positional and pleuritic.  It would come on only in the mornings.  This happened for the last 4 to 5 days PTA.  This would get better through the day as he would exert himself.  Raising his arms would make the pain better.  In any case he measured his pulse and it was elevated and as such he came to the ER.  He was noted to be in atrial fibrillation with RVR.  Echocardiogram showed EF of 45%.  Patient was started on rate control strategy and anticoagulation.  I am following up over the weekend.    1. New atrial fibrillation with rapid ventricular response  2. Abdominal aortic aneurysm repair  3. New mild cardiomyopathy with EF of 45 to 50% possible tachycardia induced versus ischemic heart disease  4. Severe coronary calcifications on chest CT  5. History of CLL  6. Chronic kidney disease  7. Hypertension diabetes hyperlipidemia    Plan and recommendations  1. The patient has been started on rate control strategy.  Agree with the plan.  He is scheduled for CHEYANNE cardioversion on Monday.  He has been started on anticoagulation.  Patient denies any bleeding problems.  2. The patient's chest pain has resolved.  His history is very consistent and says that this was pleuritic and positional chest pain.  He does not have clinical angina.  3. Recommend repeating echocardiogram as an outpatient in a couple of weeks after restoration of normal rhythm.  4. Recommend outpatient nuclear stress test given chest CT findings are severe coronary calcifications and is atypical chest pain and possible wall motion of the LAD reported on echo.  If this is abnormal, that after anticoagulation can be safely stopped following cardioversion, invasive coronary work-up can be  considered.  5. Check with vascular surgery if he needs to still be on Plavix now since he has been started on Eliquis for atrial fibrillation.  6. For today continue rate control strategy keep patient on telemetry we will follow along tomorrow morning.  Call us with any change in clinical status.    The risks and benefits of CHEYANNE have been discussed with the patient in detail including the risks of serious complications including rare risk of death, esophageal tear or trauma, emergent surgery, pharyngeal hematoma/ trauma, methemoglobinemia, gastrointestinal bleeding etc. Patient denies any active upper GI issues or bleeding or difficult swallowing. Denies prior sedation related problems. The patient understands the above mentioned risks and is willing to proceed with the CHEYANNE.    The risks and benefits of the cardioversion procedure have been explained to patient in detail including but not limited to minor or serious life-threatening arrhythmias, heart pauses/ block, death, burns, respiratory failure needing ventilation, sedation related or anesthetic complications, cardiac arrest, need for CPR, temporary or permanent pacemaker implantation, anesthesia, ineffectiveness of the procedure, discomfort and stroke.  The patient voiced understanding. Patient understands that cardioversion is NOT a cure for atrial fibrillation and may not be successful in certain cases to achieve restoration of sinus rhythm and in certain cases patients may immediately or later revert back in to atrial fibrillation or other atrial arrhythmias.     Jeremy Bhatti MD        Interval History:   No overnight issues.  The patient feels well. Heart rate is stable.          Medications:       apixaban ANTICOAGULANT  5 mg Oral BID     clopidogrel  75 mg Oral Daily     furosemide  20 mg Oral Daily     [Held by provider] ibrutinib  420 mg Oral Daily     insulin aspart  1-7 Units Subcutaneous TID AC     insulin aspart  1-5 Units Subcutaneous At Bedtime      metoprolol tartrate  100 mg Oral BID     rosuvastatin  20 mg Oral At Bedtime     sodium chloride (PF)  3 mL Intracatheter Q8H            Physical Exam:     Patient Vitals for the past 24 hrs:   BP Temp Temp src Pulse Resp SpO2   08/01/21 0756 (!) 122/92 97.3  F (36.3  C) Oral (!) 124 -- 98 %   08/01/21 0410 109/71 97.4  F (36.3  C) Oral 104 18 97 %   08/01/21 0028 127/77 98.1  F (36.7  C) Oral 108 20 98 %   07/31/21 2032 114/66 98.1  F (36.7  C) Oral (!) 129 20 97 %   07/31/21 1531 (!) 103/91 98.1  F (36.7  C) Oral 91 20 100 %   07/31/21 1223 91/75 -- -- 107 -- 98 %     Vitals:    07/30/21 0900   Weight: 94.6 kg (208 lb 8.9 oz)         Intake/Output Summary (Last 24 hours) at 7/31/2021 1016  Last data filed at 7/31/2021 0900  Gross per 24 hour   Intake 490 ml   Output 225 ml   Net 265 ml       07/27 0700 - 08/01 0659  In: 760 [P.O.:760]  Out: 475 [Urine:475]  Net: 285    Exam:  GENERAL APPEARANCE ADULT: Alert, in no acute distress  RESP: lungs clear to auscultation   CV: irregularly irregular rhythm  ABDOMEN: no guarding or rebound  EXTREMITIES: No edema         Data:   LABS (Last four results)  CMP  Recent Labs   Lab 08/01/21  0736 08/01/21  0727 08/01/21  0216 07/31/21  2127 07/31/21  1757 07/31/21  0546 07/30/21  0326   NA  --   --   --   --   --  137 135   POTASSIUM 4.4  --   --   --   --  4.1 3.9   CHLORIDE  --   --   --   --   --  105 105   CO2  --   --   --   --   --  27 24   ANIONGAP  --   --   --   --   --  5 6   GLC  --  187* 139* 259* 132* 153* 198*   BUN  --   --   --   --   --  32* 29   CR  --   --   --   --   --  1.69* 1.58*   GFRESTIMATED  --   --   --   --   --  38* 41*   MAGDALENO  --   --   --   --   --  8.8 8.8   MAG 2.1  --   --   --   --   --   --      CBC  Recent Labs   Lab 07/30/21  0808 07/30/21  0326   WBC 10.7 11.6*   RBC 3.86* 3.96*   HGB 12.5* 12.8*   HCT 37.8* 38.6*   MCV 98 98   MCH 32.4 32.3   MCHC 33.1 33.2   RDW 13.8 13.4   * 102*     INRNo lab results found in last 7  days.  TROPONINS   Lab Results   Component Value Date    TROPI <0.015 08/23/2020    TROPONIN <0.015 07/30/2021    TROPONIN <0.015 07/30/2021    TROPONIN <0.015 07/30/2021    TROPONIN <0.015 07/30/2021                                                                                                               Jeremy Bhatti MD

## 2021-08-01 NOTE — PROGRESS NOTES
[facility] Oncology Consultation    Austin Garza MRN# [id number]   Age: 81 year old YOB: 1940     Date of Admission:  7/30/2021    Reason for consult: A.fib on Ibrutinib       Requesting physician: Dr. Guy                   Assessment and Plan:   Assessment:   Austin is a very pleasant 81-year-old gentleman with multiple medical problems including a history of AAA repair on Plavix, CKD, hypertension, hyperlipidemia and type II DM, also with history of CLL, on ibrutinib, admitted with new onset A. fib with RVR.      Plan:     1.  A. fib-known complication of ibrutinib therapy, although patient had other risk factors for developing A. fib.  Agree with holding ibrutinib while inpatient.  Atrial fibrillation is not an absolute contraindication to continuing ibrutinib therapy subsequently.  Majority of patients are able to resume Ibrutinib after first episode of A.fib without recurrence of A. Fib.  Recommend follow-up with Dr. Perry as an outpatient to discuss pros and cons of continuing on ibrutinib.  A. fib management per cardiology team.  Continue apixaban with plan for CHEYANNE guided cardioversion on Monday, August 2.  Continue metoprolol for rate control.  He will also need outpatient work-up for coronary artery disease down the line.    2. CLL- hold ibrutinib until outpatient follow-up with Dr. Perry.  I have communicated with Dr. Perry today regarding patient's current admission.  Waiting to hear back.  WBC and ALC are normal.  Hemoglobin and platelet count stable, mildly low.    3.  CKD-creatinine stable.    4. HTN- he was on losartan and hydrochlorothiazide combination tablet prior to admission.  This has been on hold inpatient.  Continue metoprolol.  Aggressive blood pressure control recommended especially if resumes ibrutinib therapy in the near future, as ibrutinib can be associated with worsening hypertension.    5. AAA- s/p EVAR in 12/2017 and percutaneous aneurysm sac puncture  and endo leak embolization by IR on 5/13/19.  On Plavix prior to admission which is on hold in view of him being on apixaban at this time.    6. Type 2 DM              History of Present Illness:   This patient is a 81 year old history of endovascular AAA repair on Plavix, CKD stage III, HTN, HLD and DM 2, CLL, who is admitted on 7/30 after presenting to ED with 5-day complaints of chest pain, and was found to be in  new onset atrial fibrillation with rapid ventricular response as well as new mild cardiomyopathy.  He was started on apixaban.  He was also started on metoprolol for rate control.  The plan is for CHEYANNE guided cardioversion on Monday, August 2.  Echocardiogram was reviewed and demonstrated reduced LVEF at 45 to 50%.  The ascending aorta was mildly dilated.In some views the mid to basal inferolateral wall  and the distal inferior wall appear mildly to moderately hypokinetic.  Right ventricle was normal in size but right ventricular function could not be technically assessed.  It appeared to be mildly reduced in some views.  He has severe coronary artery calcifications on CT.  The chest pain was not felt to be cardiac.  Serial troponins have been negative.  He had a CT chest pulmonary angiogram on admission which showed no evidence of PE.  Ectatic ascending thoracic aorta measured 4.3 centimeters in diameter.  There was cardiac enlargement and trace pericardial effusion.  There was atelectasis in lower lungs.  Currently the patient is feeling well.  He has no chest pain.  He is able to take deeper breaths without pain.  He had pleuritic chest pain on admission too.  He has some bruises on his forearms from work in the backyard last week.  Wife was at bedside for this visit.   In addition, a complete 12 point  review of systems is negative.           Cancer Treatment History:   1. CLL/SLL- Patient of Dr. Perry.  Diagnosed with CLL in November 2017 when he was noted to have marked leukocytosis, mild anemia  and mild thrombocytopenia.  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  He had left axillary, periaortic and retroperitoneal lymphadenopathy as well as splenomegaly on CT.  He has been on ibrutinib since May 2020.  2. 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       Past Medical History:     Past Medical History:   Diagnosis Date     AAA (abdominal aortic aneurysm)      BCC (basal cell carcinoma of skin) - face      CLL (chronic lymphocytic leukemia)      Diaphragmatic hernia      Diverticulitis of colon      Esophageal reflux      Essential hypertension      Hyperlipidemia      Irritable bowel syndrome      Macular degeneration (senile) of retina      Squamous Cell Carcinoma, Back 1988     Type 2 diabetes mellitus              Past Surgical History:     Past Surgical History:   Procedure Laterality Date     APPENDECTOMY OPEN  1966     BONE MARROW BIOPSY, BONE SPECIMEN, NEEDLE/TROCAR N/A 11/21/2017    Procedure: BIOPSY BONE MARROW;  BONE MARROW BIOPSY;  Surgeon: Shady Garcia MD;  Location:  GI     COLONOSCOPY  2002     ENDOVASCULAR REPAIR ANEURYSM ABDOMINAL AORTA N/A 12/4/2017    Procedure: ENDOVASCULAR REPAIR ANEURYSM ABDOMINAL AORTA;  ENDOVASCULAR REPAIR ANEURYSM ABDOMINAL AORTA;  Surgeon: Milton Cazares MD;  Location:  OR     Fistulotomy with marsupialization for repair of fisture in ano  2007     IR ABDOMINAL AORTOGRAM  3/11/2019     IR VISCERAL EMBOLIZATION  5/13/2019     Multiple skin tags - resection  1998     Squamous cell skin cancer resection - back  1985-90               Social History:     Social History     Socioeconomic History     Marital status:      Spouse name: librado     Number of children: 0     Years of education: 17     Highest education level:  Not on file   Occupational History     Occupation: retired   Tobacco Use     Smoking status: Former Smoker     Packs/day: 0.50     Years: 20.00     Pack years: 10.00     Quit date: 1975     Years since quittin.6     Smokeless tobacco: Former User   Substance and Sexual Activity     Alcohol use: Yes     Alcohol/week: 10.0 - 14.0 standard drinks     Types: 10 - 14 Standard drinks or equivalent per week     Comment: 2 drinks a day/wine     Drug use: No     Sexual activity: Never   Other Topics Concern     Parent/sibling w/ CABG, MI or angioplasty before 65F 55M? Not Asked   Social History Narrative     Not on file     Social Determinants of Health     Financial Resource Strain:      Difficulty of Paying Living Expenses:    Food Insecurity:      Worried About Running Out of Food in the Last Year:      Ran Out of Food in the Last Year:    Transportation Needs:      Lack of Transportation (Medical):      Lack of Transportation (Non-Medical):    Physical Activity:      Days of Exercise per Week:      Minutes of Exercise per Session:    Stress:      Feeling of Stress :    Social Connections:      Frequency of Communication with Friends and Family:      Frequency of Social Gatherings with Friends and Family:      Attends Mu-ism Services:      Active Member of Clubs or Organizations:      Attends Club or Organization Meetings:      Marital Status:    Intimate Partner Violence:      Fear of Current or Ex-Partner:      Emotionally Abused:      Physically Abused:      Sexually Abused:                Family History:     Family History   Problem Relation Age of Onset     Cerebrovascular Disease Father         x 2     Hypertension Mother      C.A.D. Mother      Cancer Mother         skin     Eye Disorder Mother         glaucoma     Prostate Cancer No family hx of      Cancer - colorectal No family hx of      Glaucoma No family hx of      Macular Degeneration No family hx of           Allergies:        Allergies  "  Allergen Reactions     Ace Inhibitors      cough             Medications:     Current Facility-Administered Medications   Medication     acetaminophen (TYLENOL) tablet 650 mg    Or     acetaminophen (TYLENOL) Suppository 650 mg     apixaban ANTICOAGULANT (ELIQUIS) tablet 5 mg     clopidogrel (PLAVIX) tablet 75 mg     glucose gel 15-30 g    Or     dextrose 50 % injection 25-50 mL    Or     glucagon injection 1 mg     furosemide (LASIX) tablet 20 mg     [Held by provider] ibrutinib (IMBRUVICA) tablet 420 mg     insulin aspart (NovoLOG) injection (RAPID ACTING)     insulin aspart (NovoLOG) injection (RAPID ACTING)     lidocaine (LMX4) cream     lidocaine 1 % 0.1-1 mL     melatonin tablet 1 mg     metoprolol tartrate (LOPRESSOR) tablet 100 mg     ondansetron (ZOFRAN-ODT) ODT tab 4 mg    Or     ondansetron (ZOFRAN) injection 4 mg     rosuvastatin (CRESTOR) tablet 20 mg     sodium chloride (PF) 0.9% PF flush 3 mL     sodium chloride (PF) 0.9% PF flush 3 mL          Physical Exam:   BP (!) 122/92 (BP Location: Left arm)   Pulse (!) 124   Temp 97.3  F (36.3  C) (Oral)   Resp 18   Ht 1.753 m (5' 9\")   Wt 94.6 kg (208 lb 8.9 oz)   SpO2 98%   BMI 30.80 kg/m        GENERAL APPEARANCE: Elderly, alert and no distress, sitting up comfortably in bed.  HEENT: PEERLA, no neck asymmetry or masses  Lymphatics: No cervical, supraclavicular, axillary lymphadenopathy palpable bilaterally    CV: S1S2 reg no murmur  Respiratory: CTA b/l  GI: soft,  NT, ND, hepatosplenomegaly difficult to evaluate secondary to body habitus  Extremities: no edema.    SKIN: no suspicious lesions or rashes except for small ecchymosis on anterior bilateral forearms.    PSYCHIATRIC: mentation appears normal and affect normal  Neuro: gait intact. axox3            Data:      Cardiac imaging per HPI.  CT reviewed in HPI.  BMP unremarkable with exception of creatinine of 1.69 and elevated BG.  Magnesium and potassium normal.  CBC on admission with WBC of " 10.7, hemoglobin of 12.5 and platelet count of 103,000/mcL.  Hemoglobin A1c 7.0%.  Platelet count stable compared to prior to admission as below:  Results for MARK MORA (MRN [id number]) as of 8/1/2021 09:40   Ref. Range 4/5/2021 09:13 5/4/2021 09:18 6/1/2021 09:15 7/13/2021 09:33 7/30/2021 03:26 7/30/2021 08:08   Platelet Count Latest Ref Range: 150 - 450 10e3/uL 109 (L) 125 (L) 119 (L) 109 (L) 102 (L) 103 (L)   Creatinine also stable as compared to prior to admission as below:  Results for MARK MORA (MRN [id number]) as of 8/1/2021 09:40   Ref. Range 4/5/2021 09:13 5/4/2021 09:18 6/1/2021 09:15 7/13/2021 09:33 7/30/2021 03:26 7/31/2021 05:46   Creatinine Latest Ref Range: 0.66 - 1.25 mg/dL 1.48 (H) 1.57 (H) 1.51 (H) 1.54 (H) 1.58 (H) 1.69 (H)     Attestation:    Melissa Arteaga MD, MD

## 2021-08-01 NOTE — PLAN OF CARE
VSS on RA. Tele: Afib CVR-RVR. A/o. Independent in the room. Ambulated in the kemp x1. , 202, 175 Insulin given per sliding scale. Hem/Onc/Cards following. Plan for CHEYANNE/Cardioversion tomorrow and possible discharge home after procedure. Will continue POC.

## 2021-08-01 NOTE — PLAN OF CARE
VS stable. SIMMONS. Diminished LS. Blood sugar level of 259 and 139. No complaints of pain. Tele is a fib RVR. Slept well throughout the night.

## 2021-08-01 NOTE — PROGRESS NOTES
[facility]    Medicine Progress Note - Hospitalist Service       Date of Admission:  7/30/2021  Length of stay: 2 days    Assessment & Plan   Austin Garza is a 11-year-old male with a history of endovascular AAA repair on Plavix, CKD stage III, HTN, HLD and DM 2 who is admitted to the hospitalist service with new onset atrial fibrillation with rapid ventricular response as well as new mild cardiomyopathy.    Patient presented to the ED complaining of chest pain actually present for 5 days prior to admission describes a pleuritic sensation.  Was incidentally found to be in A. fib with RVR and admitted to the hospital.    Atrial fibrillation with rapid ventricular response  - Found to be in A. fib with RVR in the ED.  This is a new diagnosis for the patient.  Other than the chest pain he did not seem to be overtly symptomatic and I doubt the chest pain was causing this.  - He has been started on apixaban for stroke prophylaxis.  - Started on metoprolol which has been adjusted to 100 mg twice daily for rate control.  - Current plan is for CHEYANNE guided cardioversion on Monday, 8/2.  - It is noted that the patient's oral chemotherapy medication ibrutinib has a known adverse effect of atrial fibrillation.  For that reason, this medication will be held and oncology consulted.    Acute systolic heart failure  - Patient's EF found to be 45 to 50% on echo.  No diagnosis of heart failure previously.  Could be tachycardia induced.  But patient has strong risk factors for coronary disease, including AAA, HTN, HLD and DM2.  He has severe coronary artery calcifications on his CT.  - Defer to cardiology timing and decision regarding ischemic work-up.  - 8/1 complaining of shortness of breath, could be due to some pulmonary edema, lasix 20 mg daily    CLL  - Followed by [place] oncology, Dr. Perry.  - Has been on ibrutinib since May 2020.  As noted, this can cause arrhythmias.    - Consult oncology to see if this new  "onset atrial fibrillation means that ibrutinib needs to be discontinued.    Chest pain  - Seems musculoskeletal versus costochondritis.  Described as worse with inspiration, bending and twisting.  Was not related to exertion.  Serial troponins have been negative.  CT PE protocol negative as well.  No sign of ACS.    Abdominal aortic aneurysm  - Incidentally found during his CLL evaluation.  Underwent EVAR on 12/4/2017.  Underwent percutaneous aneurysm sac puncture and endoleak embolization by IR in May 2019.  - On Plavix prior to admission.    - Defer to cardiology to discuss with vascular surgery whether he needs to be on Plavix still on that he is on apixaban.    DM2  - A1c 7.2%.  Only on Metformin. Holding that while admitted, resume on discharge and use sliding scale insulin instead.    HTN  - Prior to admission on losartan and HCTZ combination pill.  Holding that while we work on rate control.    Diet: Cardiac  DVT Prophylaxis: Apixaban  Malnutrition: No  Siddiqui Catheter: No  Code Status: Full Code     Disposition Plan   Expected discharge:   CHEYANNE guided cardioversion tomorrow and possible discharge after.     Yaya Mir MD  Hospitalist Service  [facility]  ______________________________________________________________________    Interval History   Feeling a little bit short of breath today. But no chest pain or other issues.     Data reviewed today: I reviewed all medications, new labs and imaging results over the last 24 hours.     Physical Exam   BP (!) 122/92 (BP Location: Left arm)   Pulse (!) 124   Temp 97.3  F (36.3  C) (Oral)   Resp 18   Ht 1.753 m (5' 9\")   Wt 94.6 kg (208 lb 8.9 oz)   SpO2 98%   BMI 30.80 kg/m    208 lbs 8.88 oz       General: Looks well.    HEENT: No scleral icterus. Oropharynx moist.     Neck: Supple. Normal range of motion.     Pulmonary: Normal work of breathing. Clear to auscultation bilaterally.    Cardiovascular: Rapid and irregular " rhythm.    Abdomen: Soft and non-tender.    Extremities: No peripheral edema. No clubbing or cyanosis.     Neurologic: Awake, alert, appropriate.    Skin: Warm and dry.    Psychiatric: Normal affect and mood.     Data    Recent Labs   Lab 08/01/21  0736 08/01/21  0727 08/01/21  0216 07/31/21  2127 07/31/21  0546 07/30/21  1533 07/30/21  0808 07/30/21  0729 07/30/21  0326 07/30/21  0326   WBC  --   --   --   --   --   --  10.7  --   --  11.6*   HGB  --   --   --   --   --   --  12.5*  --   --  12.8*   MCV  --   --   --   --   --   --  98  --   --  98   PLT  --   --   --   --   --   --  103*  --   --  102*   NA  --   --   --   --  137  --   --   --   --  135   POTASSIUM 4.4  --   --   --  4.1  --   --   --   --  3.9   CHLORIDE  --   --   --   --  105  --   --   --   --  105   CO2  --   --   --   --  27  --   --   --   --  24   BUN  --   --   --   --  32*  --   --   --   --  29   CR  --   --   --   --  1.69*  --   --   --   --  1.58*   ANIONGAP  --   --   --   --  5  --   --   --   --  6   MAGDALENO  --   --   --   --  8.8  --   --   --   --  8.8   GLC  --  187* 139* 259* 153*  --   --   --    < > 198*   TROPONIN  --   --   --   --   --  <0.015 <0.015 <0.015  --  <0.015    < > = values in this interval not displayed.     No results found for this or any previous visit (from the past 24 hour(s)).    Medications      Current Facility-Administered Medications   Medication Dose Route Frequency     apixaban ANTICOAGULANT  5 mg Oral BID     clopidogrel  75 mg Oral Daily     furosemide  20 mg Oral Daily     [Held by provider] ibrutinib  420 mg Oral Daily     insulin aspart  1-7 Units Subcutaneous TID AC     insulin aspart  1-5 Units Subcutaneous At Bedtime     metoprolol tartrate  100 mg Oral BID     metoprolol tartrate  25 mg Oral Once     rosuvastatin  20 mg Oral At Bedtime     sodium chloride (PF)  3 mL Intracatheter Q8H

## 2021-08-02 ENCOUNTER — APPOINTMENT (OUTPATIENT)
Dept: CARDIOLOGY | Facility: CLINIC | Age: 81
DRG: 308 | End: 2021-08-02
Attending: INTERNAL MEDICINE
Payer: COMMERCIAL

## 2021-08-02 ENCOUNTER — ANESTHESIA (OUTPATIENT)
Dept: SURGERY | Facility: CLINIC | Age: 81
DRG: 308 | End: 2021-08-02
Payer: COMMERCIAL

## 2021-08-02 ENCOUNTER — ANESTHESIA EVENT (OUTPATIENT)
Dept: SURGERY | Facility: CLINIC | Age: 81
DRG: 308 | End: 2021-08-02
Payer: COMMERCIAL

## 2021-08-02 VITALS
RESPIRATION RATE: 18 BRPM | WEIGHT: 208.56 LBS | OXYGEN SATURATION: 98 % | DIASTOLIC BLOOD PRESSURE: 86 MMHG | TEMPERATURE: 97.5 F | SYSTOLIC BLOOD PRESSURE: 125 MMHG | HEIGHT: 69 IN | HEART RATE: 92 BPM | BODY MASS INDEX: 30.89 KG/M2

## 2021-08-02 LAB
ANION GAP SERPL CALCULATED.3IONS-SCNC: 5 MMOL/L (ref 3–14)
ATRIAL RATE - MUSE: 91 BPM
BUN SERPL-MCNC: 32 MG/DL (ref 7–30)
CALCIUM SERPL-MCNC: 8.7 MG/DL (ref 8.5–10.1)
CHLORIDE BLD-SCNC: 105 MMOL/L (ref 94–109)
CO2 SERPL-SCNC: 27 MMOL/L (ref 20–32)
CREAT SERPL-MCNC: 1.69 MG/DL (ref 0.66–1.25)
DIASTOLIC BLOOD PRESSURE - MUSE: NORMAL MMHG
ERYTHROCYTE [DISTWIDTH] IN BLOOD BY AUTOMATED COUNT: 13.6 % (ref 10–15)
GFR SERPL CREATININE-BSD FRML MDRD: 38 ML/MIN/1.73M2
GLUCOSE BLD-MCNC: 169 MG/DL (ref 70–99)
GLUCOSE BLDC GLUCOMTR-MCNC: 162 MG/DL (ref 70–99)
GLUCOSE BLDC GLUCOMTR-MCNC: 185 MG/DL (ref 70–99)
GLUCOSE BLDC GLUCOMTR-MCNC: 258 MG/DL (ref 70–99)
HCT VFR BLD AUTO: 38.4 % (ref 40–53)
HGB BLD-MCNC: 12.5 G/DL (ref 13.3–17.7)
INR PPP: 1.24 (ref 0.85–1.15)
INTERPRETATION ECG - MUSE: NORMAL
LVEF ECHO: NORMAL
MAGNESIUM SERPL-MCNC: 1.9 MG/DL (ref 1.6–2.3)
MCH RBC QN AUTO: 32 PG (ref 26.5–33)
MCHC RBC AUTO-ENTMCNC: 32.6 G/DL (ref 31.5–36.5)
MCV RBC AUTO: 98 FL (ref 78–100)
P AXIS - MUSE: 40 DEGREES
PLATELET # BLD AUTO: 120 10E3/UL (ref 150–450)
POTASSIUM BLD-SCNC: 4 MMOL/L (ref 3.4–5.3)
POTASSIUM BLD-SCNC: 4 MMOL/L (ref 3.4–5.3)
PR INTERVAL - MUSE: 194 MS
QRS DURATION - MUSE: 80 MS
QT - MUSE: 346 MS
QTC - MUSE: 425 MS
R AXIS - MUSE: -11 DEGREES
RBC # BLD AUTO: 3.91 10E6/UL (ref 4.4–5.9)
SODIUM SERPL-SCNC: 137 MMOL/L (ref 133–144)
SYSTOLIC BLOOD PRESSURE - MUSE: NORMAL MMHG
T AXIS - MUSE: 4 DEGREES
VENTRICULAR RATE- MUSE: 91 BPM
WBC # BLD AUTO: 8 10E3/UL (ref 4–11)

## 2021-08-02 PROCEDURE — 93325 DOPPLER ECHO COLOR FLOW MAPG: CPT | Mod: 26 | Performed by: INTERNAL MEDICINE

## 2021-08-02 PROCEDURE — 250N000011 HC RX IP 250 OP 636

## 2021-08-02 PROCEDURE — 85610 PROTHROMBIN TIME: CPT | Performed by: INTERNAL MEDICINE

## 2021-08-02 PROCEDURE — 250N000009 HC RX 250

## 2021-08-02 PROCEDURE — 250N000013 HC RX MED GY IP 250 OP 250 PS 637: Performed by: INTERNAL MEDICINE

## 2021-08-02 PROCEDURE — 83735 ASSAY OF MAGNESIUM: CPT | Performed by: INTERNAL MEDICINE

## 2021-08-02 PROCEDURE — 85027 COMPLETE CBC AUTOMATED: CPT | Performed by: INTERNAL MEDICINE

## 2021-08-02 PROCEDURE — 92960 CARDIOVERSION ELECTRIC EXT: CPT

## 2021-08-02 PROCEDURE — 93312 ECHO TRANSESOPHAGEAL: CPT | Mod: 26 | Performed by: INTERNAL MEDICINE

## 2021-08-02 PROCEDURE — 99232 SBSQ HOSP IP/OBS MODERATE 35: CPT | Performed by: INTERNAL MEDICINE

## 2021-08-02 PROCEDURE — 84132 ASSAY OF SERUM POTASSIUM: CPT | Performed by: INTERNAL MEDICINE

## 2021-08-02 PROCEDURE — 99152 MOD SED SAME PHYS/QHP 5/>YRS: CPT | Performed by: INTERNAL MEDICINE

## 2021-08-02 PROCEDURE — 92960 CARDIOVERSION ELECTRIC EXT: CPT | Performed by: INTERNAL MEDICINE

## 2021-08-02 PROCEDURE — 370N000017 HC ANESTHESIA TECHNICAL FEE, PER MIN

## 2021-08-02 PROCEDURE — 99232 SBSQ HOSP IP/OBS MODERATE 35: CPT | Mod: 25 | Performed by: INTERNAL MEDICINE

## 2021-08-02 PROCEDURE — 80048 BASIC METABOLIC PNL TOTAL CA: CPT | Performed by: INTERNAL MEDICINE

## 2021-08-02 PROCEDURE — 5A2204Z RESTORATION OF CARDIAC RHYTHM, SINGLE: ICD-10-PCS | Performed by: INTERNAL MEDICINE

## 2021-08-02 PROCEDURE — 250N000011 HC RX IP 250 OP 636: Performed by: NURSE ANESTHETIST, CERTIFIED REGISTERED

## 2021-08-02 PROCEDURE — 258N000003 HC RX IP 258 OP 636: Performed by: INTERNAL MEDICINE

## 2021-08-02 PROCEDURE — 36415 COLL VENOUS BLD VENIPUNCTURE: CPT | Performed by: INTERNAL MEDICINE

## 2021-08-02 PROCEDURE — 93320 DOPPLER ECHO COMPLETE: CPT | Mod: 26 | Performed by: INTERNAL MEDICINE

## 2021-08-02 PROCEDURE — 93325 DOPPLER ECHO COLOR FLOW MAPG: CPT

## 2021-08-02 RX ORDER — METOPROLOL TARTRATE 1 MG/ML
2.5-5 INJECTION, SOLUTION INTRAVENOUS
Status: DISCONTINUED | OUTPATIENT
Start: 2021-08-02 | End: 2021-08-02

## 2021-08-02 RX ORDER — FLUMAZENIL 0.1 MG/ML
0.2 INJECTION, SOLUTION INTRAVENOUS
Status: DISCONTINUED | OUTPATIENT
Start: 2021-08-02 | End: 2021-08-02

## 2021-08-02 RX ORDER — NALOXONE HYDROCHLORIDE 0.4 MG/ML
0.4 INJECTION, SOLUTION INTRAMUSCULAR; INTRAVENOUS; SUBCUTANEOUS
Status: DISCONTINUED | OUTPATIENT
Start: 2021-08-02 | End: 2021-08-02

## 2021-08-02 RX ORDER — MAGNESIUM SULFATE HEPTAHYDRATE 40 MG/ML
2 INJECTION, SOLUTION INTRAVENOUS
Status: DISCONTINUED | OUTPATIENT
Start: 2021-08-02 | End: 2021-08-02 | Stop reason: HOSPADM

## 2021-08-02 RX ORDER — LIDOCAINE HYDROCHLORIDE 40 MG/ML
1.5 SOLUTION TOPICAL ONCE
Status: DISCONTINUED | OUTPATIENT
Start: 2021-08-02 | End: 2021-08-02

## 2021-08-02 RX ORDER — POTASSIUM CHLORIDE 1500 MG/1
20 TABLET, EXTENDED RELEASE ORAL
Status: DISCONTINUED | OUTPATIENT
Start: 2021-08-02 | End: 2021-08-02 | Stop reason: HOSPADM

## 2021-08-02 RX ORDER — POTASSIUM CHLORIDE 1500 MG/1
40 TABLET, EXTENDED RELEASE ORAL
Status: DISCONTINUED | OUTPATIENT
Start: 2021-08-02 | End: 2021-08-02 | Stop reason: HOSPADM

## 2021-08-02 RX ORDER — GLYCOPYRROLATE 0.2 MG/ML
INJECTION INTRAMUSCULAR; INTRAVENOUS
Status: COMPLETED
Start: 2021-08-02 | End: 2021-08-02

## 2021-08-02 RX ORDER — FENTANYL CITRATE 50 UG/ML
25 INJECTION, SOLUTION INTRAMUSCULAR; INTRAVENOUS
Status: DISCONTINUED | OUTPATIENT
Start: 2021-08-02 | End: 2021-08-02

## 2021-08-02 RX ORDER — LIDOCAINE 50 MG/G
OINTMENT TOPICAL ONCE
Status: DISCONTINUED | OUTPATIENT
Start: 2021-08-02 | End: 2021-08-02

## 2021-08-02 RX ORDER — NALOXONE HYDROCHLORIDE 0.4 MG/ML
INJECTION, SOLUTION INTRAMUSCULAR; INTRAVENOUS; SUBCUTANEOUS
Status: DISCONTINUED
Start: 2021-08-02 | End: 2021-08-02 | Stop reason: WASHOUT

## 2021-08-02 RX ORDER — PROPOFOL 10 MG/ML
INJECTION, EMULSION INTRAVENOUS PRN
Status: DISCONTINUED | OUTPATIENT
Start: 2021-08-02 | End: 2021-08-02

## 2021-08-02 RX ORDER — METOPROLOL TARTRATE 50 MG
50 TABLET ORAL 2 TIMES DAILY
Status: DISCONTINUED | OUTPATIENT
Start: 2021-08-02 | End: 2021-08-02 | Stop reason: HOSPADM

## 2021-08-02 RX ORDER — GLYCOPYRROLATE 0.2 MG/ML
0.1 INJECTION, SOLUTION INTRAMUSCULAR; INTRAVENOUS ONCE
Status: DISCONTINUED | OUTPATIENT
Start: 2021-08-02 | End: 2021-08-02

## 2021-08-02 RX ORDER — LIDOCAINE HYDROCHLORIDE 20 MG/ML
SOLUTION OROPHARYNGEAL
Status: COMPLETED
Start: 2021-08-02 | End: 2021-08-02

## 2021-08-02 RX ORDER — METOPROLOL TARTRATE 50 MG
50 TABLET ORAL 2 TIMES DAILY
Qty: 60 TABLET | Refills: 0 | Status: SHIPPED | OUTPATIENT
Start: 2021-08-02 | End: 2021-09-08

## 2021-08-02 RX ORDER — SODIUM CHLORIDE 9 MG/ML
1000 INJECTION, SOLUTION INTRAVENOUS CONTINUOUS
Status: DISCONTINUED | OUTPATIENT
Start: 2021-08-02 | End: 2021-08-02

## 2021-08-02 RX ORDER — DEXTROSE MONOHYDRATE 25 G/50ML
9.5 INJECTION, SOLUTION INTRAVENOUS
Status: DISCONTINUED | OUTPATIENT
Start: 2021-08-02 | End: 2021-08-02

## 2021-08-02 RX ORDER — NALOXONE HYDROCHLORIDE 0.4 MG/ML
0.2 INJECTION, SOLUTION INTRAMUSCULAR; INTRAVENOUS; SUBCUTANEOUS
Status: DISCONTINUED | OUTPATIENT
Start: 2021-08-02 | End: 2021-08-02

## 2021-08-02 RX ORDER — FLUMAZENIL 0.1 MG/ML
INJECTION, SOLUTION INTRAVENOUS
Status: DISCONTINUED
Start: 2021-08-02 | End: 2021-08-02 | Stop reason: WASHOUT

## 2021-08-02 RX ORDER — ATROPINE SULFATE 0.1 MG/ML
.5-1 INJECTION INTRAVENOUS
Status: DISCONTINUED | OUTPATIENT
Start: 2021-08-02 | End: 2021-08-02

## 2021-08-02 RX ORDER — LIDOCAINE HYDROCHLORIDE 20 MG/ML
15 SOLUTION OROPHARYNGEAL ONCE
Status: DISCONTINUED | OUTPATIENT
Start: 2021-08-02 | End: 2021-08-02

## 2021-08-02 RX ORDER — ROSUVASTATIN CALCIUM 20 MG/1
20 TABLET, COATED ORAL AT BEDTIME
Qty: 30 TABLET | Refills: 0 | Status: SHIPPED | OUTPATIENT
Start: 2021-08-02 | End: 2021-09-05

## 2021-08-02 RX ORDER — FENTANYL CITRATE 50 UG/ML
INJECTION, SOLUTION INTRAMUSCULAR; INTRAVENOUS
Status: COMPLETED
Start: 2021-08-02 | End: 2021-08-02

## 2021-08-02 RX ADMIN — GLYCOPYRROLATE 0.1 MG: 0.2 INJECTION, SOLUTION INTRAMUSCULAR; INTRAVENOUS at 08:18

## 2021-08-02 RX ADMIN — CLOPIDOGREL BISULFATE 75 MG: 75 TABLET ORAL at 10:23

## 2021-08-02 RX ADMIN — TOPICAL ANESTHETIC 0.5 ML: 200 SPRAY DENTAL; PERIODONTAL at 08:15

## 2021-08-02 RX ADMIN — SODIUM CHLORIDE 500 ML: 9 INJECTION, SOLUTION INTRAVENOUS at 07:47

## 2021-08-02 RX ADMIN — LIDOCAINE HYDROCHLORIDE 30 ML: 20 SOLUTION OROPHARYNGEAL at 08:07

## 2021-08-02 RX ADMIN — MIDAZOLAM 2 MG: 1 INJECTION INTRAMUSCULAR; INTRAVENOUS at 08:37

## 2021-08-02 RX ADMIN — METOPROLOL TARTRATE 100 MG: 100 TABLET, FILM COATED ORAL at 10:22

## 2021-08-02 RX ADMIN — APIXABAN 5 MG: 5 TABLET, FILM COATED ORAL at 10:22

## 2021-08-02 RX ADMIN — FENTANYL CITRATE 50 MCG: 50 INJECTION, SOLUTION INTRAMUSCULAR; INTRAVENOUS at 08:38

## 2021-08-02 RX ADMIN — PROPOFOL 50 MG: 10 INJECTION, EMULSION INTRAVENOUS at 09:03

## 2021-08-02 RX ADMIN — LIDOCAINE HYDROCHLORIDE 30 ML: 20 SOLUTION ORAL; TOPICAL at 08:07

## 2021-08-02 ASSESSMENT — ACTIVITIES OF DAILY LIVING (ADL)
ADLS_ACUITY_SCORE: 13

## 2021-08-02 NOTE — OP NOTE
Procedure Date: 2021    PROCEDURE:  Cardioversion.    PROCEDURE IN DETAIL:  This a 81-year-old gentleman with persistent atrial fibrillation.  CHEYANNE showed no evidence of intracardiac thrombi.  After anesthesia was expertly administered, a single synchronized shock of 120 joules restored normal sinus rhythm.  The patient tolerated the procedure well.      Alberto Santiago MD, [facility]        D: 2021   T: 2021   MT: [email or web address]    Name:     MARK MORA  MRN:      [id number]        Account:        [id number]   :      1940           Procedure Date: 2021     Document: [id number]

## 2021-08-02 NOTE — SEDATION DOCUMENTATION
Transported back to room 313, portable tele pack on.  Pt awake, VSS.  Denies pain.  Report will be given on arrival.

## 2021-08-02 NOTE — ANESTHESIA PREPROCEDURE EVALUATION
Anesthesia Pre-Procedure Evaluation    Patient: Austin Garza   MRN: [id number] : 1940        Preoperative Diagnosis: Atrial fibrillation (H) [I48.91]   Procedure : Procedure(s):  ANESTHESIA, FOR CARDIOVERSION     Past Medical History:   Diagnosis Date     AAA (abdominal aortic aneurysm)      BCC (basal cell carcinoma of skin) - face      CLL (chronic lymphocytic leukemia)      Diaphragmatic hernia      Diverticulitis of colon      Esophageal reflux      Essential hypertension      Hyperlipidemia      Irritable bowel syndrome      Macular degeneration (senile) of retina      Squamous Cell Carcinoma, Back      Type 2 diabetes mellitus       Past Surgical History:   Procedure Laterality Date     APPENDECTOMY OPEN       BONE MARROW BIOPSY, BONE SPECIMEN, NEEDLE/TROCAR N/A 2017    Procedure: BIOPSY BONE MARROW;  BONE MARROW BIOPSY;  Surgeon: Shady Garcia MD;  Location:  GI     COLONOSCOPY       ENDOVASCULAR REPAIR ANEURYSM ABDOMINAL AORTA N/A 2017    Procedure: ENDOVASCULAR REPAIR ANEURYSM ABDOMINAL AORTA;  ENDOVASCULAR REPAIR ANEURYSM ABDOMINAL AORTA;  Surgeon: Milton Cazares MD;  Location:  OR     Fistulotomy with marsupialization for repair of fisture in ano       IR ABDOMINAL AORTOGRAM  3/11/2019     IR VISCERAL EMBOLIZATION  2019     Multiple skin tags - resection  1998     Squamous cell skin cancer resection - back        Allergies   Allergen Reactions     Ace Inhibitors      cough      Social History     Tobacco Use     Smoking status: Former Smoker     Packs/day: 0.50     Years: 20.00     Pack years: 10.00     Quit date: 1975     Years since quittin.6     Smokeless tobacco: Former User   Substance Use Topics     Alcohol use: Yes     Alcohol/week: 10.0 - 14.0 standard drinks     Types: 10 - 14 Standard drinks or equivalent per week     Comment: 2 drinks a day/wine      Wt Readings from Last 1 Encounters:   21 94.6 kg (208 lb  8.9 oz)           Physical Exam    Airway        Mallampati: II   TM distance: > 3 FB   Neck ROM: full     Respiratory Devices and Support     Nasal Canula      Dental       (+) chipped      Cardiovascular          Rhythm and rate: irregular     Pulmonary    Unable to assess               OUTSIDE LABS:  CBC:   Lab Results   Component Value Date    WBC 8.0 08/02/2021    WBC 10.7 07/30/2021    HGB 12.5 (L) 08/02/2021    HGB 12.5 (L) 07/30/2021    HCT 38.4 (L) 08/02/2021    HCT 37.8 (L) 07/30/2021     (L) 08/02/2021     (L) 07/30/2021     BMP:   Lab Results   Component Value Date     08/02/2021     07/31/2021    POTASSIUM 4.0 08/02/2021    POTASSIUM 4.0 08/02/2021    CHLORIDE 105 08/02/2021    CHLORIDE 105 07/31/2021    CO2 27 08/02/2021    CO2 27 07/31/2021    BUN 32 (H) 08/02/2021    BUN 32 (H) 07/31/2021    CR 1.69 (H) 08/02/2021    CR 1.69 (H) 07/31/2021     (H) 08/02/2021     (H) 08/02/2021     COAGS:   Lab Results   Component Value Date    PTT 26 05/13/2019    INR 1.24 (H) 08/02/2021     POC:   Lab Results   Component Value Date     (H) 08/24/2020     HEPATIC:   Lab Results   Component Value Date    ALBUMIN 3.7 07/13/2021    PROTTOTAL 7.0 07/13/2021    ALT 19 07/13/2021    AST 14 07/13/2021    ALKPHOS 49 07/13/2021    BILITOTAL 0.8 07/13/2021     OTHER:   Lab Results   Component Value Date    A1C 7.0 (H) 07/30/2021    MAGDALENO 8.7 08/02/2021    MAG 1.9 08/02/2021    TSH 3.57 12/31/2019    CRP 6.7 05/29/2019    SED 29 (H) 05/29/2019       Anesthesia Plan    ASA Status:  3   NPO Status:  NPO Appropriate    Anesthesia Type: MAC.   Induction: Intravenous.           Consents    Anesthesia Plan(s) and associated risks, benefits, and realistic alternatives discussed. Questions answered and patient/representative(s) expressed understanding.     - Discussed with:  Patient      - Extended Intubation/Ventilatory Support Discussed: No.      - Patient is DNR/DNI Status: No    Use of  blood products discussed: No .     Postoperative Care            Comments:                GISELL Griffith CRNA

## 2021-08-02 NOTE — PRE-PROCEDURE
GENERAL PRE-PROCEDURE:   Date/Time:  8/2/2021 8:36 AM    Risks and benefits: Risks, benefits and alternatives were discussed    Patient states understanding of procedure being performed: Yes    Patient's understanding of procedure matches consent: Yes    Procedure consent matches procedure scheduled: Yes    Appropriately NPO:  Yes  Mallampati  :  Grade 2- soft palate, base of uvula, tonsillar pillars, and portion of posterior pharyngeal wall visible  Lungs:  Lungs clear with good breath sounds bilaterally  Heart:  Normal heart sounds and rate and a-fib  History & Physical reviewed:  History and physical reviewed and no updates needed  Statement of review:  I have reviewed the lab findings, diagnostic data, medications, and the plan for sedation

## 2021-08-02 NOTE — PROGRESS NOTES
[facility]    Medicine Progress Note - Hospitalist Service       Date of Admission:  7/30/2021  Length of stay: 3 days    Assessment & Plan   Austin Garza is a 11-year-old male with a history of endovascular AAA repair on Plavix, CKD stage III, HTN, HLD and DM 2 who is admitted to the hospitalist service with new onset atrial fibrillation with rapid ventricular response as well as new mild cardiomyopathy.    Patient presented to the ED complaining of chest pain actually present for 5 days prior to admission describes a pleuritic sensation.  Was incidentally found to be in A. fib with RVR and admitted to the hospital.    Atrial fibrillation with rapid ventricular response  - Found to be in A. fib with RVR in the ED.  This is a new diagnosis for the patient.  Other than the chest pain he did not seem to be overtly symptomatic and I doubt the chest pain was causing this.  - He has been started on apixaban for stroke prophylaxis.  - Started on metoprolol which has been adjusted to 100 mg twice daily for rate control.  - S/p successful CHEYANNE-guided cardioversion  - Defer to cardiology whether he still needs the metoprolol now that he is in sinus rhythm.  - Discharge patient on apixaban.    Acute systolic heart failure  - Patient's EF found to be 45 to 50% on echo.  No diagnosis of heart failure previously.  Could be tachycardia induced.  But patient has strong risk factors for coronary disease, including AAA, HTN, HLD and DM2.  He has severe coronary artery calcifications on his CT.  - Defer to cardiology timing and decision regarding ischemic work-up. Sounds as if outpatient stress test is planned.     CLL   - Followed by [facility], Dr. Perry.  - Has been on ibrutinib since May 2020.  As noted, this can cause arrhythmias.    - Consulted oncology to see if this new onset atrial fibrillation means that ibrutinib needs to be discontinued. He will hold ibrutinib on discharge and follow-up with his  "oncologist. Usually, atrial fibrillation alone is not an absolute contraindication to ibrutinib.    Chest pain  - Seems musculoskeletal versus costochondritis.  Described as worse with inspiration, bending and twisting.  Was not related to exertion.  Serial troponins have been negative.  CT PE protocol negative as well.  No sign of ACS.    Abdominal aortic aneurysm  - Incidentally found during his CLL evaluation.  Underwent EVAR on 12/4/2017.  Underwent percutaneous aneurysm sac puncture and endoleak embolization by IR in May 2019.  - On Plavix prior to admission.    - Defer to cardiology to discuss with vascular surgery whether he needs to be on Plavix still on that he is on apixaban.    DM2  - A1c 7.2%.  Only on Metformin. Holding that while admitted, resume on discharge and use sliding scale insulin instead.    HTN  - Prior to admission on losartan and HCTZ combination pill.  - Discharge HTN medications depending on whether he still needs 100 mg metoprolol BID now that he is in sinus rhythm    Diet: Cardiac  DVT Prophylaxis: Apixaban  Malnutrition: No  Siddiqui Catheter: No  Code Status: Full Code     Disposition Plan   Expected discharge:   Discharge to home today after seeing cardiology.    Yaya Mir MD  Hospitalist Service  [facility]  ______________________________________________________________________    Interval History     Seen after CHEYANNE which was successful in restoring sinus rhythm. Patient is asymptomatic.    Data reviewed today: I reviewed all medications, new labs and imaging results over the last 24 hours.     Physical Exam   /86 (BP Location: Left arm)   Pulse 92   Temp 97.5  F (36.4  C) (Oral)   Resp 18   Ht 1.753 m (5' 9\")   Wt 94.6 kg (208 lb 8.9 oz)   SpO2 98%   BMI 30.80 kg/m    208 lbs 8.88 oz       General: Looks well.    HEENT: No scleral icterus. Oropharynx moist.     Neck: Supple. Normal range of motion.     Pulmonary: Normal work of breathing. Clear " to auscultation bilaterally.    Cardiovascular: Normal rate and rhythm.    Abdomen: Soft and non-tender.    Extremities: No peripheral edema. No clubbing or cyanosis.     Neurologic: Awake, alert, appropriate.    Skin: Warm and dry.    Psychiatric: Normal affect and mood.     Data    Recent Labs   Lab 21  1004 21  0657 21  0204 21  0736 21  0546 21  1533 21  0808 21  0729 21  0326 21  0326   WBC  --  8.0  --   --   --   --  10.7  --   --  11.6*   HGB  --  12.5*  --   --   --   --  12.5*  --   --  12.8*   MCV  --  98  --   --   --   --  98  --   --  98   PLT  --  120*  --   --   --   --  103*  --   --  102*   INR  --  1.24*  --   --   --   --   --   --   --   --    NA  --  137  --   --  137  --   --   --   --  135   POTASSIUM  --  4.0  4.0  --  4.4 4.1  --   --   --   --  3.9   CHLORIDE  --  105  --   --  105  --   --   --   --  105   CO2  --  27  --   --  27  --   --   --   --  24   BUN  --  32*  --   --  32*  --   --   --   --  29   CR  --  1.69*  --   --  1.69*  --   --   --   --  1.58*   ANIONGAP  --  5  --   --  5  --   --   --   --  6   MAGDALENO  --  8.7  --   --  8.8  --   --   --   --  8.8   * 169* 162*  --  153*  --   --   --    < > 198*   TROPONIN  --   --   --   --   --  <0.015 <0.015 <0.015  --  <0.015    < > = values in this interval not displayed.     Recent Results (from the past 24 hour(s))   Transesophageal Echocardiogram   Result Value    LVEF  40-45%    [facility]    [id number]  [facility]  [id number]  [id number]^MICHAEL^CEDRIC     [facility]  Echocardiography Laboratory  [address] [place] [address]     Name: MARK MORA  MRN: [id number]  : 1940  Study Date: 2021 08:03 AM  Age: 81 yrs  Gender: Male  Patient Location: [facility]  Reason For Study: Afib  Ordering Physician: CEDRIC RODRIGUEZ  Performed By: Crissy Leung     BSA: 2.1 m2  Height: 69 in  Weight: 208 lb  HR: 153  BP: 105/74  mmHg  ______________________________________________________________________________  Procedure  Complete CHEYANNE Adult. CHEYANNE Probe serial #[id number] (R) was used during the  procedure. The heart rate, respiratory rate and response to care were  monitored throughout the procedure with the assistance of the nurse.  ______________________________________________________________________________  Interpretation Summary     The visual ejection fraction is 40-45%.  The rhythm was rapid atrial fibrillation.  No thrombus is detected in the left atrial appendage. Normal mechanical  function.  ______________________________________________________________________________  CHEYANNE  Versed (2mg) was given intravenously. Fentanyl (50mcg) was given  intravenously. I determined this patient to be an appropriate candidate for  the planned sedation and procedure and have reassessed the patient immediately  prior to sedation and procedure. Total sedation time: 8 minutes of continuous  bedside 1:1 monitoring. There were no complications associated with this  procedure. The transesophageal probe was passed without difficulty.     Left Ventricle  The left ventricle is normal in size. There is normal left ventricular wall  thickness. The visual ejection fraction is 40-45%. There is mild global  hypokinesia of the left ventricle.     Right Ventricle  The right ventricle is normal size. Right ventricular function cannot be  assessed due to poor image quality.     Atria  A contrast injection (Bubble Study) was performed that was negative for flow  across the interatrial septum. No thrombus is detected in the left atrial  appendage.     Mitral Valve  The mitral valve leaflets appear normal. There is no evidence of stenosis,  fluttering, or prolapse. There is mild (1+) mitral regurgitation.     Tricuspid Valve  Normal tricuspid valve.     Aortic Valve  There is trivial trileaflet aortic sclerosis. There is trace to mild aortic  regurgitation.      Pulmonic Valve  The pulmonic valve is not well seen, but is grossly normal.     Vessels  The aortic root is normal size. Mild atherosclerotic plaque(s) in the aortic  arch.     Pericardial/Pleural  There is no pericardial effusion.     Rhythm  The rhythm was rapid atrial fibrillation.  ______________________________________________________________________________  Report approved by: Sharonda Hurd 08/02/2021 11:18 AM     ______________________________________________________________________________          Medications      Current Facility-Administered Medications   Medication Dose Route Frequency     apixaban ANTICOAGULANT  5 mg Oral BID     clopidogrel  75 mg Oral Daily     [Held by provider] furosemide  20 mg Oral Daily     [Held by provider] ibrutinib  420 mg Oral Daily     insulin aspart  1-7 Units Subcutaneous TID AC     insulin aspart  1-5 Units Subcutaneous At Bedtime     lidocaine   Topical Once    Or     lidocaine  1.5 mL Topical Once     metoprolol tartrate  100 mg Oral BID     rosuvastatin  20 mg Oral At Bedtime     sodium chloride (PF)  3 mL Intravenous Q8H     sodium chloride (PF)  3 mL Intravenous Q8H     sodium chloride (PF)  3 mL Intracatheter Q8H

## 2021-08-02 NOTE — PROGRESS NOTES
Oncology:     Chart check: CLL and pt of Dr Perry's. Currently on ibrutinib, but on hold for new Afib. Underwent cardioversion today. Counts are stable. I have a message out to Dr Perry for follow up this week. Will follow.        Elizabeth Jerome PA-C  Hematology/Oncology  [facility]

## 2021-08-02 NOTE — PROGRESS NOTES
[facility]     Cardiology Progress Note    Primary cardiologist: Dr Schaffer (new this stay)     Date of Service (when I saw the patient): 08/02/2021         Assessment and Plan:     81-year-old gentleman with a history of abdominal AAA repair (EVAR on 12/4/2017, underwent percutaneous aneurysm sac puncture and endoleak embolization by IR in May 2019) on chronic Plavix, CKD, HTN, HLD, DM II and history of CLL (counts are normal).  Patient was admitted with atypical nonanginal sounding chest discomfort and elevated HR.  He was noted to be in atrial fibrillation with RVR.  Echocardiogram showed EF of 45%.  Patient was started on rate control strategy and anticoagulation.  He underwent CHEYANNE guided DCCV this AM.     1. New atrial fibrillation with rapid ventricular response s/p DCCV to SR  2. Abdominal aortic aneurysm repair  3. New mild cardiomyopathy with EF of ~45% possible tachycardia induced versus ischemic heart disease  4. Severe coronary calcifications on chest CT  5.   History of CLL. Has been on ibrutinib since May 2020.  It was noted this can cause arrhythmias. He will hold ibrutinib on discharge and follow-up with his oncologist. Usually, atrial fibrillation alone is not an absolute contraindication to ibrutinib.  5. Chronic kidney disease  6. Hypertension/diabetes/hyperlipidemia    Plan and recommendations  1. Continue Eliquis 5mg BID and metoprolol 100mg BID  2. The patient's chest pain has resolved.  His history is very consistent and says that this was pleuritic and positional chest pain.  He does not have clinical angina.  3. Recommend repeating echocardiogram as an outpatient in a couple of weeks after restoration of normal rhythm.  4. Recommend outpatient nuclear stress test given chest CT findings show severe coronary calcifications and his atypical chest pain and possible wall motion of the LAD reported on echo.  If this is abnormal, then after anticoagulation can be safely stopped  following cardioversion, invasive coronary work-up can be considered. Crestor increased this stay, follow up lab outpt.  5. Will stop Plavix for now since he has been started on Eliquis and does not appear to have any active ischemic symptoms/problems. Can readdress if he needs coronary PCI down the line.     Follow up 1-2 weeks in the cardiology clinic with an EKG at that time. Stress testing and repeat echo can be arranged at that time. (Follow up arranged with me on 8/18 @10:30 AM at our [place] location)    Maria Isabel Raines PA-C          Interval History:   Successful CHEYANNE guided DCCV to SR this AM. Feels well.           Medications:       apixaban ANTICOAGULANT  5 mg Oral BID     clopidogrel  75 mg Oral Daily     [Held by provider] furosemide  20 mg Oral Daily     [Held by provider] ibrutinib  420 mg Oral Daily     insulin aspart  1-7 Units Subcutaneous TID AC     insulin aspart  1-5 Units Subcutaneous At Bedtime     lidocaine   Topical Once    Or     lidocaine  1.5 mL Topical Once     metoprolol tartrate  100 mg Oral BID     rosuvastatin  20 mg Oral At Bedtime     sodium chloride (PF)  3 mL Intravenous Q8H     sodium chloride (PF)  3 mL Intravenous Q8H     sodium chloride (PF)  3 mL Intracatheter Q8H            Physical Exam:     Patient Vitals for the past 24 hrs:   BP Temp Temp src Pulse Resp SpO2   08/02/21 1020 125/86 97.5  F (36.4  C) Oral 92 18 98 %   08/02/21 0947 106/72 -- -- 92 14 97 %   08/02/21 0937 112/76 -- -- 91 16 98 %   08/02/21 0922 103/70 -- -- 98 14 98 %   08/02/21 0911 104/74 -- -- 90 14 98 %   08/02/21 0906 -- -- -- 92 21 97 %   08/02/21 0901 125/86 -- -- (!) 123 14 97 %   08/02/21 0853 123/88 -- -- (!) 123 17 98 %   08/02/21 0849 (!) 130/90 -- -- 115 17 98 %   08/02/21 0844 (!) 130/103 -- -- (!) 128 20 --   08/02/21 0842 (!) 142/103 -- -- (!) 122 20 97 %   08/02/21 0842 -- -- -- -- 20 --   08/02/21 0839 (!) 138/104 -- -- (!) 126 8 99 %   08/02/21 0828 (!) 134/103 -- -- (!) 140 16  98 %   08/02/21 0749 105/74 -- -- 112 18 96 %   08/02/21 0405 (!) 118/92 97.6  F (36.4  C) Oral 99 16 98 %   08/01/21 2346 99/71 98.1  F (36.7  C) Oral 103 18 100 %   08/01/21 2118 121/75 -- -- 111 18 97 %   08/01/21 1952 127/85 -- -- -- 18 96 %   08/01/21 1539 113/81 98.1  F (36.7  C) Oral 88 18 99 %     Vitals:    07/30/21 0900   Weight: 94.6 kg (208 lb 8.9 oz)         Intake/Output Summary (Last 24 hours) at 7/31/2021 1016  Last data filed at 7/31/2021 0900  Gross per 24 hour   Intake 490 ml   Output 225 ml   Net 265 ml       07/28 0700 - 08/02 0659  In: 1300 [P.O.:1300]  Out: 475 [Urine:475]  Net: 825    Exam:  GENERAL APPEARANCE ADULT: Alert, in no acute distress  RESP: lungs clear to auscultation   CV: RRR   EXTREMITIES: No edema         Data:   LABS (Last four results)  CMP  Recent Labs   Lab 08/02/21  1004 08/02/21  0657 08/02/21  0204 08/01/21  2107 08/01/21  0736 07/31/21  0546 07/30/21  0326   NA  --  137  --   --   --  137 135   POTASSIUM  --  4.0  4.0  --   --  4.4 4.1 3.9   CHLORIDE  --  105  --   --   --  105 105   CO2  --  27  --   --   --  27 24   ANIONGAP  --  5  --   --   --  5 6   * 169* 162* 262*  --  153* 198*   BUN  --  32*  --   --   --  32* 29   CR  --  1.69*  --   --   --  1.69* 1.58*   GFRESTIMATED  --  38*  --   --   --  38* 41*   MAGDALENO  --  8.7  --   --   --  8.8 8.8   MAG  --  1.9  --   --  2.1  --   --      CBC  Recent Labs   Lab 08/02/21  0657 07/30/21  0808 07/30/21  0326   WBC 8.0 10.7 11.6*   RBC 3.91* 3.86* 3.96*   HGB 12.5* 12.5* 12.8*   HCT 38.4* 37.8* 38.6*   MCV 98 98 98   MCH 32.0 32.4 32.3   MCHC 32.6 33.1 33.2   RDW 13.6 13.8 13.4   * 103* 102*     INR  Recent Labs   Lab 08/02/21  0657   INR 1.24*     TROPONINS   Lab Results   Component Value Date    TROPI <0.015 08/23/2020    TROPONIN <0.015 07/30/2021    TROPONIN <0.015 07/30/2021    TROPONIN <0.015 07/30/2021    TROPONIN <0.015 07/30/2021

## 2021-08-02 NOTE — PLAN OF CARE
VS stable. SIMMONS. Diminished LS. Blood sugar level of 262 and 162. No complaints of pain. Tele is a fib RVR. Slept well throughout the night.

## 2021-08-02 NOTE — ANESTHESIA CARE TRANSFER NOTE
Patient: Austin Garza    Procedure(s):  ANESTHESIA, FOR CARDIOVERSION    Diagnosis: Atrial fibrillation (H) [I48.91]  Diagnosis Additional Information: No value filed.    Anesthesia Type:   No value filed.     Note:    Oropharynx: oropharynx clear of all foreign objects  Level of Consciousness: awake  Oxygen Supplementation: nasal cannula  Level of Supplemental Oxygen (L/min / FiO2): 4  Independent Airway: airway patency satisfactory and stable  Dentition: dentition unchanged  Vital Signs Stable: post-procedure vital signs reviewed and stable  Report to RN Given: handoff report given  Destination: cardiopulmonary.  Comments: Pt's VSS, A/O x3 resting comfortably post procedure, care continued by RN.         Vitals:  Vitals Value Taken Time   /74 08/02/21 0911   Temp     Pulse 90 08/02/21 0911   Resp 14 08/02/21 0911   SpO2 98 % 08/02/21 0911       Electronically Signed By: GISELL Griffith CRNA  August 2, 2021  9:17 AM

## 2021-08-02 NOTE — PLAN OF CARE
A&OX4. Up independently. VSS on RA. Cardioversion successful. Tele: SR. . Insulin given per sliding scale. Discharge instruction given, patient expressed understanding. Discharge medication given to patient. Patient discharge home with felipa.

## 2021-08-03 ENCOUNTER — PATIENT OUTREACH (OUTPATIENT)
Dept: CARE COORDINATION | Facility: CLINIC | Age: 81
End: 2021-08-03

## 2021-08-03 ENCOUNTER — TELEPHONE (OUTPATIENT)
Dept: CARDIOLOGY | Facility: CLINIC | Age: 81
End: 2021-08-03

## 2021-08-03 ENCOUNTER — HOSPITAL ENCOUNTER (OUTPATIENT)
Dept: OCCUPATIONAL THERAPY | Facility: CLINIC | Age: 81
End: 2021-08-03
Attending: INTERNAL MEDICINE
Payer: COMMERCIAL

## 2021-08-03 DIAGNOSIS — Z71.89 OTHER SPECIFIED COUNSELING: ICD-10-CM

## 2021-08-03 DIAGNOSIS — Z78.9 IMPAIRED INSTRUMENTAL ACTIVITIES OF DAILY LIVING (IADL): Primary | ICD-10-CM

## 2021-08-03 DIAGNOSIS — R41.3 MEMORY LOSS: ICD-10-CM

## 2021-08-03 PROCEDURE — 96125 COGNITIVE TEST BY HC PRO: CPT | Mod: GO | Performed by: OCCUPATIONAL THERAPIST

## 2021-08-03 NOTE — PROGRESS NOTES
"Clinic Care Coordination Contact  [facility]: Post-Discharge Note  SITUATION                                                      Admission:    Admission Date: 07/30/21   Reason for Admission: Chest pain  Discharge:   Discharge Date: 08/02/21  Discharge Diagnosis: Atrial fibrillation with rapid ventricular response    BACKGROUND                                                      Austin Garza is a 11-year-old male with a history of endovascular AAA repair on Plavix, CKD stage III, HTN, HLD and DM 2 who is admitted to the hospitalist service with new onset atrial fibrillation with rapid ventricular response as well as new mild cardiomyopathy.     Patient presented to the ED complaining of chest pain actually present for 5 days prior to admission describes a pleuritic sensation.  Was incidentally found to be in A. fib with RVR and admitted to the hospital.    ASSESSMENT      Discharge Assessment  How are you doing now that you are home?: \" I am doing just fine\"  How are your symptoms? (Red Flag symptoms escalate to triage hotline per guidelines): Improved  Do you feel your condition is stable enough to be safe at home until your provider visit?: Yes  Does the patient have their discharge instructions? : Yes  Does the patient have questions regarding their discharge instructions? : No  Were you started on any new medications or were there changes to any of your previous medications? : Yes - Schedule RNCC appt within 48 business hours  Does the patient have all of their medications?: Yes  Do you have questions regarding any of your medications? : No  Do you have all of your needed medical supplies or equipment (DME)?  (i.e. oxygen tank, CPAP, cane, etc.): Yes  Discharge follow-up appointment scheduled within 14 calendar days? : Yes  Discharge Follow Up Appointment Date: 08/06/21  Discharge Follow Up Appointment Scheduled with?: Specialty Care Provider    Post-op  Did the patient have surgery or a procedure: " No  Fever: No  Chills: No  Eating & Drinking: eating and drinking without complaints/concerns  PO Intake: regular diet  Bowel Function: normal  Urinary Status: voiding without complaint/concerns        PLAN                                                      Outpatient Plan: Follow-up Appointments     Follow-up and recommended labs and tests       Follow up with cardiology as scheduled.    Future Appointments   Date Time Provider Department Center   8/6/2021  1:30 PM [facility]   8/10/2021  9:30 AM RH MA LAB [facility]   8/18/2021 10:30 AM Maria Isabel Raines PA-C [facility]   9/2/2021  9:30 AM RH MA LAB [facility]   9/7/2021  2:45 PM [facility]   9/15/2021  3:00 PM Wenceslao Teran MD [facility]         For any urgent concerns, please contact our 24 hour nurse triage line: [phone] ([phone])         Bruce Womack

## 2021-08-03 NOTE — DISCHARGE SUMMARY
"[facility]  Hospitalist Discharge Summary       Date of Admission:  7/30/2021  Date of Discharge:  8/2/2021  2:28 PM  Discharging Provider: Yaya Mir MD      Discharge Diagnoses   Atrial fibrillation with rapid ventricular response  Acute systolic heart failure  Musculoskeletal chest pain  CLL  Essential hypertension  DM2    Follow-ups Needed After Discharge   Follow-up Appointments     Follow-up and recommended labs and tests       Follow up with cardiology as scheduled.             Discharge Disposition   Discharged to home  Condition at discharge: Stable    History of Present Illness   Per admission H&P:  \"This is a 81-year-old gentleman with a history of type 2 diabetes, hypertension, chronic lymphocytic leukemia on treatment, prior history of abdominal aortic aneurysm, status post endovascular repair on Plavix, who presents with pain in his chest.  The pain in his chest has been on since Monday.  It comes and goes.  He feels like he has been hit by a softball and the sternum.  It is unrelated to activity.  It normally lasts for an hour and then dissipates.  However, today he was woken up by pain in his chest and he also had pain in his shoulder blades, which was worse with deep inspiration.  He denies any worsening shortness of breath.  He denies any radiation of the pain.  He denies any nausea, vomiting or diaphoresis.  In the ER, he was seen by Dr. Delacruz.  I discussed care with her.  I am asked to admit him for further evaluation.\"    Hospital Course     Austin Garza is a 11-year-old male with a history of endovascular AAA repair on Plavix, CKD stage III, HTN, HLD and DM 2 who is admitted to the hospitalist service with new onset atrial fibrillation with rapid ventricular response as well as new mild cardiomyopathy.     Patient presented to the ED complaining of chest pain actually present for 5 days prior to admission describes a pleuritic sensation.  Was " incidentally found to be in A. fib with RVR and admitted to the hospital.     Atrial fibrillation with rapid ventricular response   Patient was incidentally found to be in atrial fibrillation with rapid ventricular response.  This is a new diagnosis for him.  Unclear if the chest pain prior to presentation was a symptom of this.  He was started on metoprolol for rate control and cardiology was consulted.  Apixaban was started for stroke prophylaxis given elevated [id number] score.  He underwent CHEYANNE guided cardioversion after spending the weekend in the hospital working on rate control.  This was successful in restoring sinus rhythm.  Dosage of metoprolol reduced to 50 mg twice daily on discharge.  Provided with apixaban prescription on discharge.  Has follow-up scheduled with cardiology on 8/18.    Acute systolic heart failure  Patient's EF found to be 45 to 50% on echo.  No diagnosis of heart failure previously.  Could be tachycardia induced.  But patient has strong risk factors for coronary disease, including AAA, HTN, HLD and DM2.  He has severe coronary artery calcifications on his CT. will follow up with cardiology for outpatient nuclear stress test.  Never had any troponin leak during the hospital stay.    CLL   Followed by [facility], Dr. Perry. Has been on ibrutinib since May 2020.  As noted, this can cause arrhythmias.  Consulted oncology to see if this new onset atrial fibrillation means that ibrutinib needs to be discontinued. He will hold ibrutinib on discharge and follow-up with his oncologist. Usually, atrial fibrillation alone is not an absolute contraindication to ibrutinib.     Chest pain  Seemed musculoskeletal versus costochondritis.  Described as worse with inspiration, bending and twisting.  Was not related to exertion.  Serial troponins have been negative.  CT PE protocol negative as well.  No sign of ACS.     Abdominal aortic aneurysm  Incidentally found during his CLL evaluation.   Underwent EVAR on 12/4/2017.  Underwent percutaneous aneurysm sac puncture and endoleak embolization by IR in May 2019. On Plavix prior to admission which will be stopped on discharge.     HTN  Was on lisinopril/HCTZ combination pill prior to admission.  Blood pressure was okay while he was off of that due to being on metoprolol here.  Discharged on metoprolol alone. Follow up with PCP/cardiology.    Consultations This Hospital Stay   CARDIOLOGY IP CONSULT  PHARMACY LIAISON FOR MEDICATION COVERAGE CONSULT  PHARMACY IP CONSULT  PHARMACY IP CONSULT  PHARMACY LIAISON FOR MEDICATION COVERAGE CONSULT  PHARMACY DISCHARGE EDUCATION BY PHARMACIST  HEMATOLOGY & ONCOLOGY IP CONSULT  HEMATOLOGY & ONCOLOGY IP CONSULT    Code Status   Prior    Time Spent on this Encounter   I, Yaya Mir MD, personally saw the patient today and spent greater than 30 minutes discharging this patient.       Yaya Mir MD  [facility]  ______________________________________________________________________    Physical Exam   Vital Signs:                    Weight: 208 lbs 8.88 oz      General: Looks well.    HEENT: No scleral icterus. Oropharynx moist.     Neck: Supple. Normal range of motion.    Pulmonary: Normal work of breathing. Clear to auscultation bilaterally.    Cardiovascular: Regular rate and rhythm without murmur or extra heart sounds.    Abdomen: Soft and non-tender.    Extremities: No peripheral edema. No clubbing.    Neurologic: Awake, alert, appropriate.    Skin: Warm and dry.    Psychiatric: Normal affect and mood.       Primary Care Physician   Vanessa Munson    Discharge Orders      Discharge Order: F/U with Cardiac  ENRIQUE      Reason for your hospital stay    Atrial fibrillation     Follow-up and recommended labs and tests     Follow up with cardiology as scheduled.     Activity    Your activity upon discharge: activity as tolerated     Diet    Follow this diet upon discharge: Regular        Significant Results and Procedures   Most Recent 3 CBC's:Recent Labs   Lab Test 08/02/21  0657 07/30/21  0808 07/30/21  0326   WBC 8.0 10.7 11.6*   HGB 12.5* 12.5* 12.8*   MCV 98 98 98   * 103* 102*     Most Recent 3 BMP's:Recent Labs   Lab Test 08/02/21  1345 08/02/21  1004 08/02/21  0657 08/01/21  0736 07/31/21  0546 07/30/21  0326   NA  --   --  137  --  137 135   POTASSIUM  --   --  4.0  4.0 4.4 4.1 3.9   CHLORIDE  --   --  105  --  105 105   CO2  --   --  27  --  27 24   BUN  --   --  32*  --  32* 29   CR  --   --  1.69*  --  1.69* 1.58*   ANIONGAP  --   --  5  --  5 6   MAGDALENO  --   --  8.7  --  8.8 8.8   * 185* 169*  --  153* 198*     Most Recent 2 LFT's:Recent Labs   Lab Test 07/13/21  0933 06/01/21  0915   AST 14 15   ALT 19 21   ALKPHOS 49 58   BILITOTAL 0.8 0.7     Most Recent 3 INR's:Recent Labs   Lab Test 08/02/21  0657 05/13/19  0650 03/11/19  0700   INR 1.24* 1.08 1.03   ,   Results for orders placed or performed during the hospital encounter of 07/30/21   CT Chest Pulmonary Embolism w Contrast    Narrative    EXAM: CT CHEST PULMONARY EMBOLISM W CONTRAST  LOCATION: [facility]  DATE/TIME: 7/30/2021 4:24 AM    INDICATION: Chest pain.  COMPARISON: None.  TECHNIQUE: CT chest pulmonary angiogram during arterial phase injection of IV contrast. Multiplanar reformats and MIP reconstructions were performed. Dose reduction techniques were used.   CONTRAST: 71 mL Isovue-370.    FINDINGS:  ANGIOGRAM CHEST: Pulmonary arteries are normal caliber and negative for pulmonary emboli. Ectatic ascending thoracic aorta measuring 4.3 cm in AP diameter. Thoracic aorta is negative for dissection. No CT evidence of right heart strain.    LUNGS AND PLEURA: Moderately prominent areas of atelectasis in both lower lungs. Nothing definite for acute infiltrate or consolidation. Central airways are clear. No significant pleural effusions.    MEDIASTINUM/AXILLAE: No adenopathy. Cardiac  enlargement. Trace amount of pericardial fluid. Esophagus is grossly negative.    CORONARY ARTERY CALCIFICATION: Severe.    UPPER ABDOMEN: Normal.    MUSCULOSKELETAL: Mild degenerative changes in the spine.      Impression    IMPRESSION:  1.  Negative for pulmonary embolism.    2.  Ectatic ascending thoracic aorta measuring 4.3 cm in diameter. No evidence for dissection.    3.  Atelectasis in the lower lungs but nothing definite for acute pulmonary infiltrate or pneumonitis.    4.  Cardiac enlargement and trace pericardial effusion.    5.  Marked coronary artery calcification.               Echocardiogram Complete     Value    LVEF  45-50%    formerly [facility]    [id number]  [id number]  [id number]  [id number]^PATRICIA^KULWANT^LESLIE     [facility]  Echocardiography Laboratory  [address] [place] [address]     Name: MARK MORA  MRN: [id number]  : 1940  Study Date: 2021 08:53 AM  Age: 81 yrs  Gender: Male  Patient Location: [facility]  Reason For Study: Atrial Fibrillation  Ordering Physician: KULWANT GOMEZ  Referring Physician: Vanessa Munson  Performed By: Radha Spain     BSA: 2.1 m2  Height: 69 in  Weight: 212 lb  HR: 109  BP: 151/91 mmHg  ______________________________________________________________________________  Procedure  Complete Portable Echo Adult.  ______________________________________________________________________________  Interpretation Summary     The visual ejection fraction is 45-50%.  Left ventricular systolic function is mildly reduced.  With rapid atrial fibrillation, the visual approximation of left ventricular  systolic function may be falsely decreased.  There are regional wall motion abnormalities as specified.  Contrast would increase the accuracy of regional wall motion analysis.  Mild aortic root dilatation.  The ascending aorta is Mildly dilated.  The aortic arch is Mildly dilated.  The right ventricle was not well seen in the apical acoustic window. In  the  subcostal views, the right ventricular systolic function appears to be mildly  reduced.  There is trace aortic regurgitation.  The rhythm was rapid atrial fibrillation.  The study was technically difficult.  ______________________________________________________________________________  Left Ventricle  The left ventricle is normal in size. There is mild concentric left  ventricular hypertrophy. A sigmoid septum is present. Diastolic function not  assessed due to atrial fibrillation. With rapid atrial fibrillation, the  visual approximation of left ventricular systolic function may be falsely  decreased. The visual ejection fraction is 45-50%. Left ventricular systolic  function is mildly reduced. In some views the mid to basal inferolateral wall  and the distal inferior wall appear mildly to moderately hypokinetic. There  are regional wall motion abnormalities as specified.     Right Ventricle  The right ventricle is normal size. Right ventricular function cannot be  assessed due to poor image quality. The right ventricle was not well seen in  the apical acoustic window. In the subcostal views, the right ventricular  systolic function appears to be mildly reduced.     Atria  Normal left atrial size. Right atrial size is normal. There is no color  Doppler evidence of an atrial shunt.     Mitral Valve  There is mild mitral annular calcification. There is trace mitral  regurgitation.     Tricuspid Valve  There is trace tricuspid regurgitation. Right ventricular systolic pressure  could not be approximated due to inadequate tricuspid regurgitation.     Aortic Valve  The aortic valve is trileaflet. There is moderate trileaflet aortic sclerosis.  There is trace aortic regurgitation. No hemodynamically significant valvular  aortic stenosis.     Pulmonic Valve  There is no pulmonic valvular regurgitation. Normal pulmonic valve velocity.     Vessels  Mild aortic root dilatation. The ascending aorta is Mildly dilated.  The aortic  arch is Mildly dilated. IVC diameter and respiratory changes fall into an  intermediate range suggesting an RA pressure of 8 mmHg.     Pericardium  There is no pericardial effusion.     Rhythm  The rhythm was rapid atrial fibrillation.  ______________________________________________________________________________  MMode/2D Measurements & Calculations  IVSd: 1.8 cm     LVIDd: 3.7 cm  LVIDs: 3.1 cm  LVPWd: 1.2 cm  FS: 15.3 %  LV mass(C)d: 213.0 grams  LV mass(C)dI: 100.6 grams/m2  Ao root diam: 4.1 cm  LA dimension: 3.6 cm  asc Aorta Diam: 4.3 cm  Ao Arch Diam (Proximal trans.): 3.8 cm  LA/Ao: 0.87  LVOT diam: 2.3 cm  LVOT area: 4.3 cm2  LA Volume (BP): 54.4 ml  LA Volume Index (BP): 25.7 ml/m2  RWT: 0.66     Doppler Measurements & Calculations  LV V1 max PG: 3.1 mmHg  LV V1 max: 86.8 cm/sec  LV V1 VTI: 12.2 cm  SV(LVOT): 52.6 ml  SI(LVOT): 24.8 ml/m2  PA acc time: 0.10 sec     ______________________________________________________________________________  Report approved by: Sharonda Humphreys 2021 09:40 AM         Transesophageal Echocardiogram     Value    LVEF  40-45%    Narrative    [id number]  [facility]  [id number]  [id number]^MICHAEL^CEDRIC     [facility]  Echocardiography Laboratory  [address] [place] [address]     Name: MORGAN MARK A  MRN: [id number]  : 1940  Study Date: 2021 08:03 AM  Age: 81 yrs  Gender: Male  Patient Location: [facility]  Reason For Study: Afib  Ordering Physician: CEDRIC RODRIGUEZ  Performed By: Crissy Leung     BSA: 2.1 m2  Height: 69 in  Weight: 208 lb  HR: 153  BP: 105/74 mmHg  ______________________________________________________________________________  Procedure  Complete CHEYANNE Adult. CHEYANNE Probe serial #[id number] (R) was used during the  procedure. The heart rate, respiratory rate and response to care were  monitored throughout the procedure with the assistance of the  nurse.  ______________________________________________________________________________  Interpretation Summary     The visual ejection fraction is 40-45%.  The rhythm was rapid atrial fibrillation.  No thrombus is detected in the left atrial appendage. Normal mechanical  function.  ______________________________________________________________________________  CHEYANNE  Versed (2mg) was given intravenously. Fentanyl (50mcg) was given  intravenously. I determined this patient to be an appropriate candidate for  the planned sedation and procedure and have reassessed the patient immediately  prior to sedation and procedure. Total sedation time: 8 minutes of continuous  bedside 1:1 monitoring. There were no complications associated with this  procedure. The transesophageal probe was passed without difficulty.     Left Ventricle  The left ventricle is normal in size. There is normal left ventricular wall  thickness. The visual ejection fraction is 40-45%. There is mild global  hypokinesia of the left ventricle.     Right Ventricle  The right ventricle is normal size. Right ventricular function cannot be  assessed due to poor image quality.     Atria  A contrast injection (Bubble Study) was performed that was negative for flow  across the interatrial septum. No thrombus is detected in the left atrial  appendage.     Mitral Valve  The mitral valve leaflets appear normal. There is no evidence of stenosis,  fluttering, or prolapse. There is mild (1+) mitral regurgitation.     Tricuspid Valve  Normal tricuspid valve.     Aortic Valve  There is trivial trileaflet aortic sclerosis. There is trace to mild aortic  regurgitation.     Pulmonic Valve  The pulmonic valve is not well seen, but is grossly normal.     Vessels  The aortic root is normal size. Mild atherosclerotic plaque(s) in the aortic  arch.     Pericardial/Pleural  There is no pericardial effusion.     Rhythm  The rhythm was rapid atrial  fibrillation.  ______________________________________________________________________________  Report approved by: Sharonda Hurd 08/02/2021 11:18 AM     ______________________________________________________________________________            Discharge Medications   Discharge Medication List as of 8/2/2021  1:56 PM      START taking these medications    Details   apixaban ANTICOAGULANT (ELIQUIS) 5 MG tablet Take 1 tablet (5 mg) by mouth 2 times daily, Disp-60 tablet, R-0, E-Prescribe      metoprolol tartrate (LOPRESSOR) 50 MG tablet Take 1 tablet (50 mg) by mouth 2 times daily, Disp-60 tablet, R-0, E-Prescribe         CONTINUE these medications which have CHANGED    Details   ibrutinib (IMBRUVICA) 420 MG tablet Take 1 tablet (420 mg) by mouth daily Do not take until you hear from Dr. Perry's office., Disp-28 tablet, R-0, Historical      rosuvastatin (CRESTOR) 20 MG tablet Take 1 tablet (20 mg) by mouth At Bedtime, Disp-30 tablet, R-0, E-Prescribe         CONTINUE these medications which have NOT CHANGED    Details   desonide (DESOWEN) 0.05 % external ointment Apply topically 2 times daily To forehead x 2 weeksHistorical      metFORMIN (GLUCOPHAGE-XR) 500 MG 24 hr tablet Take 2 tablets (1,000 mg) by mouth every morning, Disp-180 tablet, R-1, E-Prescribe      Multiple Vitamins-Minerals (CENTRUM SILVER) per tablet Take 1 tablet by mouth daily, Historical      !! ACCU-CHEK GUIDE test strip Use to test blood sugar 2 times daily or as directed., Disp-100 strip, R-1, FUENTES, E-Prescribe      !! CONTOUR NEXT TEST test strip USE TO TEST BLOOD SUGAR 1-2 TIMES DAILY OR AS DIRECTED (ADJUSTING ORAL MEDICATIONS)., Disp-100 strip, R-1, E-Prescribe       !! - Potential duplicate medications found. Please discuss with provider.      STOP taking these medications       clopidogrel (PLAVIX) 75 MG tablet Comments:   Reason for Stopping:         losartan-hydrochlorothiazide (HYZAAR) 50-12.5 MG tablet Comments:   Reason for Stopping:              Allergies   Allergies   Allergen Reactions     Ace Inhibitors      cough

## 2021-08-03 NOTE — TELEPHONE ENCOUNTER
Patient himself called and advised RN he was feeling well and has no questions/concerns at this time. Patient aware of apt on 8/18/21 with ENRIQUE Maria Isabel and will call with any further questions prior to that time.

## 2021-08-05 NOTE — ADDENDUM NOTE
Encounter addended by: Claudette Maddox, SISI on: 8/5/2021 10:25 AM   Actions taken: Episode resolved

## 2021-08-05 NOTE — PROGRESS NOTES
Cognitive Assessment of [place]    SUMMARY OF TEST:  The Cognitive Assessment of [place] (CAM) is a standardized measure used to assess cognitive abilities and deficits.  The CAM is formatted to assess cognitive skills in the areas of attention, orientation, memory, following directions, temporal awareness, discrimination, sequencing, calculation, planning, verbal safety/judgment, problem solving and abstract reasoning.    DATE OF TESTIN2021    RESULTS OF TESTING:    The patient scores with no impairment in Immediate auditory memory, Immediate motor memory, Motor recall/recognition, Auditory memory/sequencing forward, Teller recognition, Money skills mental manipulation, Single digit math skills, Multiple digit math skills, Simple problem solving, Moderate problem solving and Mental flexibility    The patient scores with mild impairment in Visual memory/sequencing backward, Auditory memory/sequencing backward and Planning    The patient scores with moderate impairment in Visual memory/sequencing forward, Auditory recall/recognition and Complex problem solving    The patient scores with severe impairment in no sections    Test results comments:  Test was modified due to time constraints.  Pt has skills to complete sections 1-8 without error in my observation of his ability to attend and follow general directions. Test administration focused on higher level thinking tasks.     INTERPRETATION OF TEST RESULTS:     Pt struggles with short term memory tasks with emphasis on visual memory.  Pt may have difficulty attending to all the details in a complex problem solving situation.          Factors affecting performance:    No additional problems noted    Recommendations: Pt could benefit from instruction for memory compensation strategies with focus on visual memory.       TIME ADMINISTERING TEST: 45 min    TIME FOR INTERPRETATION AND PREPARATION OF REPORT: 15 min    TOTAL TIME: 60 min

## 2021-08-05 NOTE — ADDENDUM NOTE
Encounter addended by: Claudette Maddox, OT on: 8/5/2021 10:25 AM   Actions taken: Document created, Document edited

## 2021-08-06 ENCOUNTER — ONCOLOGY VISIT (OUTPATIENT)
Dept: ONCOLOGY | Facility: CLINIC | Age: 81
End: 2021-08-06
Attending: PHYSICIAN ASSISTANT
Payer: COMMERCIAL

## 2021-08-06 VITALS
RESPIRATION RATE: 18 BRPM | DIASTOLIC BLOOD PRESSURE: 81 MMHG | TEMPERATURE: 96.7 F | OXYGEN SATURATION: 98 % | HEIGHT: 69 IN | WEIGHT: 215.5 LBS | SYSTOLIC BLOOD PRESSURE: 149 MMHG | HEART RATE: 67 BPM | BODY MASS INDEX: 31.92 KG/M2

## 2021-08-06 DIAGNOSIS — D64.9 ANEMIA, UNSPECIFIED TYPE: ICD-10-CM

## 2021-08-06 DIAGNOSIS — I48.91 ATRIAL FIBRILLATION WITH RVR (H): ICD-10-CM

## 2021-08-06 DIAGNOSIS — I42.9 CARDIOMYOPATHY, UNSPECIFIED TYPE (H): ICD-10-CM

## 2021-08-06 DIAGNOSIS — C91.10 CLL (CHRONIC LYMPHOCYTIC LEUKEMIA) (H): Primary | ICD-10-CM

## 2021-08-06 PROCEDURE — 99213 OFFICE O/P EST LOW 20 MIN: CPT | Performed by: PHYSICIAN ASSISTANT

## 2021-08-06 PROCEDURE — G0463 HOSPITAL OUTPT CLINIC VISIT: HCPCS

## 2021-08-06 ASSESSMENT — PAIN SCALES - GENERAL: PAINLEVEL: NO PAIN (0)

## 2021-08-06 ASSESSMENT — MIFFLIN-ST. JEOR: SCORE: 1682.88

## 2021-08-06 NOTE — LETTER
8/6/2021         RE: Austin Garza  [address] [place] [address]        Dear Colleague,    Thank you for referring your patient, Austin Garza, to the [facility]. Please see a copy of my visit note below.    Oncology/Hematology Visit Note    Aug 6, 2021    Reason for visit: Follow up CLL     Oncology HPI: Austin Garza is a 81 year old male with PMHx of DMII, HTN who presented with leukocytosis.  In November 2017, he was found to have elevated WBC of 61.7K, hemoglobin of 10.4, and platelet of 115K.  There were no other CBC results available between 2010 and 2017.  Prior to 2010, he had normal CBC, with normal to mild anemia, and mild thrombocytopenia.   He was asymptomatic.     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  The presence of increased numbers of ringed sideroblasts raises the possibility of an associated myelodysplastic syndrome.  Dysplastic changes are not identified though.  Many cases exhibiting ringed sideroblasts are metabolic origin, without significant risk of progression to acute leukemia, and these typically do not show dysplastic morphology as is the case with this specimen.  15% ringed sideroblasts are required for a diagnosis for RARS, which this specimen does not meet.  Flow cytometry is also consistent with CLL/SLL.  Cytogenetics show two (10%) of the metaphase cells comprise a clone characterized by a deletion within the proximal long arm of a chromosome 13 as the sole karyotypic abnormality.  Three (15%) metaphases had loss of the Y chromosome as the sole abnormality.     CT scan on 11/29/17 shows mildly enlarged left axillary lymph node and periaortic lymph nodes in the retroperitoneum of the abdomen.  Spleen is also enlarged.     On his staging CT scan for CLL, he was incidentally found to have a large infrarenal abdominal aortic aneurysm, measuring 7.6  cm.  He was seen by Dr. Thomas, and he underwent EVAR on 12/4/17.      He started ibrutinib on 5/4/20.  It was held 5/28/20-6/4/20 for tooth extraction.    He was hospitalized on 7/30/2021 for shortness of breath, new CHF, A. fib with RVR and ibrutinib was put on hold.  He was seen by cardiology and eventually underwent cardioversion for the A. fib.  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    He is here today for close hospital follow-up.    Interval History: Austin is doing pretty well today.  He has been recovering pretty well from his hospital stay.  He has not followed up with cardiology yet, but he has not had any chest pain, shortness of breath, palpitations.  He continues to hold ibrutinib.  No recent fever or chills, lower extremity swelling, abdominal pain.    Review of Systems: See interval hx. Denies fevers, chills, HA, dizziness, n/t, changes in vision, cough, sore throat, new CP, new SOB, abdominal pain, N/V, diarrhea, changes in urination, bleeding, bruising, rash.     PMHx and Social Hx reviewed per EPIC.      Medications:  Current Outpatient Medications   Medication Sig Dispense Refill     ACCU-CHEK GUIDE test strip Use to test blood sugar 2 times daily or as directed. 100 strip 1     apixaban ANTICOAGULANT (ELIQUIS) 5 MG tablet Take 1 tablet (5 mg) by mouth 2 times daily 60 tablet 0     CONTOUR NEXT TEST test strip USE TO TEST BLOOD SUGAR 1-2 TIMES DAILY OR AS DIRECTED (ADJUSTING ORAL MEDICATIONS). 100 strip 1     desonide (DESOWEN) 0.05 % external ointment Apply topically 2 times daily To forehead x 2 weeks       metFORMIN (GLUCOPHAGE-XR) 500 MG 24 hr tablet Take 2 tablets (1,000 mg) by mouth every morning (Patient taking differently: Take 500 mg by mouth 2 times daily (with meals) ) 180 tablet 1     metoprolol tartrate (LOPRESSOR) 50 MG tablet Take 1 tablet (50 mg) by mouth 2 times daily 60 tablet 0     Multiple Vitamins-Minerals (CENTRUM SILVER) per tablet Take 1  "tablet by mouth daily       rosuvastatin (CRESTOR) 20 MG tablet Take 1 tablet (20 mg) by mouth At Bedtime 30 tablet 0     ibrutinib (IMBRUVICA) 420 MG tablet Take 1 tablet (420 mg) by mouth daily Do not take until you hear from Dr. Perry's office. (Patient not taking: Reported on 8/6/2021) 28 tablet 0       Allergies   Allergen Reactions     Ace Inhibitors      cough         EXAM:    BP (!) 149/81   Pulse 67   Temp (!) 96.7  F (35.9  C) (Tympanic)   Resp 18   Ht 1.753 m (5' 9\")   Wt 97.8 kg (215 lb 8 oz)   SpO2 98%   BMI 31.82 kg/m      GENERAL:  Male, in no acute distress.  Alert and oriented x3.   HEENT:  Normocephalic, atraumatic.  PERRL, oropharynx clear with no sores or thrush.   LYMPH NODES:  No palpable pre/post-auricular, cervical, axillary lymphadenopathy appreciated.  CV:  Irreg irreg  LUNGS:  Clear to auscultation bilaterally.   ABDOMEN:  Soft, nontender and nondistended.  Bowel sounds heard x4.  No apparent hepatosplenomegaly.   EXTREMITIES:  No clubbing, cyanosis, or edema.   SKIN: No rash  PSYCH: Mood stable      Labs:   Results for MARK GARZA (MRN [id number]) as of 8/6/2021 17:04   8/2/2021 06:57   Sodium 137   Potassium 4.0   Chloride 105   Carbon Dioxide 27   Urea Nitrogen 32 (H)   Creatinine 1.69 (H)   GFR Estimate 38 (L)   Calcium 8.7   Anion Gap 5   Magnesium 1.9   Glucose 169 (H)   WBC 8.0   Hemoglobin 12.5 (L)   Hematocrit 38.4 (L)   Platelet Count 120 (L)   RBC Count 3.91 (L)   MCV 98   MCH 32.0   MCHC 32.6   RDW 13.6   INR 1.24 (H)       Imaging: n/a    Impression/Plan: Mark Garza is a 81 year old male with CLL/SLL currently on ibrutinib.     CLL/SLL: Currently on ibrutinib and first dose 5/4/2020.  Recently hospitalized for new A. fib with RVR and ibrutinib currently on hold.  Discussed with Dr. Perry we will continue to hold for a few more weeks to allow for further cardiac recovery.  Patient in agreement with the plan.  We will see Dr. Perry on 9/2/2021 for close " follow-up.    CV: A. fib with RVR and new cardiomyopathy.  Recently hospitalized and ibrutinib on hold. Currently on metoprolol and Eliquis.  He is followed closely with cardiology and has a follow-up outpatient appointment next week.  Breathing has improved.      Chart documentation with Dragon Voice recognition Software. Although reviewed after completion, some words and grammatical errors may remain.      Elizabeth Jerome PA-C  Hematology/Oncology  [facility]                    Again, thank you for allowing me to participate in the care of your patient.        Sincerely,        Elizabeth Jerome PA-C

## 2021-08-06 NOTE — NURSING NOTE
"Oncology Rooming Note    August 6, 2021 1:32 PM   Austin Garza is a 81 year old male who presents for:    Chief Complaint   Patient presents with     Oncology Clinic Visit     CLL (chronic lymphocytic leukemia)      Initial Vitals: BP (!) 149/81   Pulse 67   Temp (!) 96.7  F (35.9  C) (Tympanic)   Resp 18   Ht 1.753 m (5' 9\")   Wt 97.8 kg (215 lb 8 oz)   SpO2 98%   BMI 31.82 kg/m   Estimated body mass index is 31.82 kg/m  as calculated from the following:    Height as of this encounter: 1.753 m (5' 9\").    Weight as of this encounter: 97.8 kg (215 lb 8 oz). Body surface area is 2.18 meters squared.  No Pain (0) Comment: Data Unavailable   No LMP for male patient.  Allergies reviewed: Yes  Medications reviewed: Yes    Medications: Medication refills not needed today.  Pharmacy name entered into [employer]:    [facility] - [place], [place] - [address]  [id number] - [place], [place] - [address] [facility] (MAIL SERVICE) [facility] [place], [place] - [address]  [id number] - [place], [place] - [address] AT [facility] - [place], [place] - [address] [facility] - [place], [place] - [address] [facility]    Clinical concerns: f/u       Geetha Benítez CMA            "

## 2021-08-06 NOTE — PROGRESS NOTES
Oncology/Hematology Visit Note    Aug 6, 2021    Reason for visit: Follow up CLL     Oncology HPI: Austin Garza is a 81 year old male with PMHx of DMII, HTN who presented with leukocytosis.  In November 2017, he was found to have elevated WBC of 61.7K, hemoglobin of 10.4, and platelet of 115K.  There were no other CBC results available between 2010 and 2017.  Prior to 2010, he had normal CBC, with normal to mild anemia, and mild thrombocytopenia.   He was asymptomatic.     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  The presence of increased numbers of ringed sideroblasts raises the possibility of an associated myelodysplastic syndrome.  Dysplastic changes are not identified though.  Many cases exhibiting ringed sideroblasts are metabolic origin, without significant risk of progression to acute leukemia, and these typically do not show dysplastic morphology as is the case with this specimen.  15% ringed sideroblasts are required for a diagnosis for RARS, which this specimen does not meet.  Flow cytometry is also consistent with CLL/SLL.  Cytogenetics show two (10%) of the metaphase cells comprise a clone characterized by a deletion within the proximal long arm of a chromosome 13 as the sole karyotypic abnormality.  Three (15%) metaphases had loss of the Y chromosome as the sole abnormality.     CT scan on 11/29/17 shows mildly enlarged left axillary lymph node and periaortic lymph nodes in the retroperitoneum of the abdomen.  Spleen is also enlarged.     On his staging CT scan for CLL, he was incidentally found to have a large infrarenal abdominal aortic aneurysm, measuring 7.6 cm.  He was seen by Dr. Thomas, and he underwent EVAR on 12/4/17.      He started ibrutinib on 5/4/20.  It was held 5/28/20-6/4/20 for tooth extraction.    He was hospitalized on 7/30/2021 for shortness of breath, new CHF, A. fib with RVR and ibrutinib was put  on hold.  He was seen by cardiology and eventually underwent cardioversion for the A. fib.  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    He is here today for close hospital follow-up.    Interval History: Austin is doing pretty well today.  He has been recovering pretty well from his hospital stay.  He has not followed up with cardiology yet, but he has not had any chest pain, shortness of breath, palpitations.  He continues to hold ibrutinib.  No recent fever or chills, lower extremity swelling, abdominal pain.    Review of Systems: See interval hx. Denies fevers, chills, HA, dizziness, n/t, changes in vision, cough, sore throat, new CP, new SOB, abdominal pain, N/V, diarrhea, changes in urination, bleeding, bruising, rash.     PMHx and Social Hx reviewed per EPIC.      Medications:  Current Outpatient Medications   Medication Sig Dispense Refill     ACCU-CHEK GUIDE test strip Use to test blood sugar 2 times daily or as directed. 100 strip 1     apixaban ANTICOAGULANT (ELIQUIS) 5 MG tablet Take 1 tablet (5 mg) by mouth 2 times daily 60 tablet 0     CONTOUR NEXT TEST test strip USE TO TEST BLOOD SUGAR 1-2 TIMES DAILY OR AS DIRECTED (ADJUSTING ORAL MEDICATIONS). 100 strip 1     desonide (DESOWEN) 0.05 % external ointment Apply topically 2 times daily To forehead x 2 weeks       metFORMIN (GLUCOPHAGE-XR) 500 MG 24 hr tablet Take 2 tablets (1,000 mg) by mouth every morning (Patient taking differently: Take 500 mg by mouth 2 times daily (with meals) ) 180 tablet 1     metoprolol tartrate (LOPRESSOR) 50 MG tablet Take 1 tablet (50 mg) by mouth 2 times daily 60 tablet 0     Multiple Vitamins-Minerals (CENTRUM SILVER) per tablet Take 1 tablet by mouth daily       rosuvastatin (CRESTOR) 20 MG tablet Take 1 tablet (20 mg) by mouth At Bedtime 30 tablet 0     ibrutinib (IMBRUVICA) 420 MG tablet Take 1 tablet (420 mg) by mouth daily Do not take until you hear from Dr. Perry's office. (Patient not  "taking: Reported on 8/6/2021) 28 tablet 0       Allergies   Allergen Reactions     Ace Inhibitors      cough         EXAM:    BP (!) 149/81   Pulse 67   Temp (!) 96.7  F (35.9  C) (Tympanic)   Resp 18   Ht 1.753 m (5' 9\")   Wt 97.8 kg (215 lb 8 oz)   SpO2 98%   BMI 31.82 kg/m      GENERAL:  Male, in no acute distress.  Alert and oriented x3.   HEENT:  Normocephalic, atraumatic.  PERRL, oropharynx clear with no sores or thrush.   LYMPH NODES:  No palpable pre/post-auricular, cervical, axillary lymphadenopathy appreciated.  CV:  Irreg irreg  LUNGS:  Clear to auscultation bilaterally.   ABDOMEN:  Soft, nontender and nondistended.  Bowel sounds heard x4.  No apparent hepatosplenomegaly.   EXTREMITIES:  No clubbing, cyanosis, or edema.   SKIN: No rash  PSYCH: Mood stable      Labs:   Results for MARK GARZA (MRN [id number]) as of 8/6/2021 17:04   8/2/2021 06:57   Sodium 137   Potassium 4.0   Chloride 105   Carbon Dioxide 27   Urea Nitrogen 32 (H)   Creatinine 1.69 (H)   GFR Estimate 38 (L)   Calcium 8.7   Anion Gap 5   Magnesium 1.9   Glucose 169 (H)   WBC 8.0   Hemoglobin 12.5 (L)   Hematocrit 38.4 (L)   Platelet Count 120 (L)   RBC Count 3.91 (L)   MCV 98   MCH 32.0   MCHC 32.6   RDW 13.6   INR 1.24 (H)       Imaging: n/a    Impression/Plan: Mark Garza is a 81 year old male with CLL/SLL currently on ibrutinib.     CLL/SLL: Currently on ibrutinib and first dose 5/4/2020.  Recently hospitalized for new A. fib with RVR and ibrutinib currently on hold.  Discussed with Dr. Perry we will continue to hold for a few more weeks to allow for further cardiac recovery.  Patient in agreement with the plan.  We will see Dr. Perry on 9/2/2021 for close follow-up.    CV: A. fib with RVR and new cardiomyopathy.  Recently hospitalized and ibrutinib on hold. Currently on metoprolol and Eliquis.  He is followed closely with cardiology and has a follow-up outpatient appointment next week.  Breathing has " improved.      Chart documentation with Dragon Voice recognition Software. Although reviewed after completion, some words and grammatical errors may remain.      Elizabeth Jerome PA-C  Hematology/Oncology  [facility]

## 2021-08-09 ENCOUNTER — MYC MEDICAL ADVICE (OUTPATIENT)
Dept: PEDIATRICS | Facility: CLINIC | Age: 81
End: 2021-08-09

## 2021-08-09 DIAGNOSIS — E11.649 TYPE 2 DIABETES MELLITUS WITH HYPOGLYCEMIA WITHOUT COMA, WITHOUT LONG-TERM CURRENT USE OF INSULIN (H): Primary | ICD-10-CM

## 2021-08-10 ENCOUNTER — LAB (OUTPATIENT)
Dept: ONCOLOGY | Facility: CLINIC | Age: 81
End: 2021-08-10
Attending: INTERNAL MEDICINE
Payer: COMMERCIAL

## 2021-08-10 ENCOUNTER — DOCUMENTATION ONLY (OUTPATIENT)
Dept: ONCOLOGY | Facility: CLINIC | Age: 81
End: 2021-08-10

## 2021-08-10 ENCOUNTER — HOSPITAL ENCOUNTER (OUTPATIENT)
Facility: CLINIC | Age: 81
End: 2021-08-10
Attending: INTERNAL MEDICINE
Payer: COMMERCIAL

## 2021-08-10 DIAGNOSIS — C91.10 CLL (CHRONIC LYMPHOCYTIC LEUKEMIA) (H): ICD-10-CM

## 2021-08-10 LAB
ALBUMIN SERPL-MCNC: 3.5 G/DL (ref 3.4–5)
ALP SERPL-CCNC: 62 U/L (ref 40–150)
ALT SERPL W P-5'-P-CCNC: 49 U/L (ref 0–70)
ANION GAP SERPL CALCULATED.3IONS-SCNC: 5 MMOL/L (ref 3–14)
AST SERPL W P-5'-P-CCNC: 27 U/L (ref 0–45)
BASOPHILS # BLD AUTO: 0 10E3/UL (ref 0–0.2)
BASOPHILS NFR BLD AUTO: 0 %
BILIRUB SERPL-MCNC: 0.5 MG/DL (ref 0.2–1.3)
BUN SERPL-MCNC: 24 MG/DL (ref 7–30)
CALCIUM SERPL-MCNC: 8.9 MG/DL (ref 8.5–10.1)
CHLORIDE BLD-SCNC: 105 MMOL/L (ref 94–109)
CO2 SERPL-SCNC: 26 MMOL/L (ref 20–32)
CREAT SERPL-MCNC: 1.56 MG/DL (ref 0.66–1.25)
EOSINOPHIL # BLD AUTO: 0.1 10E3/UL (ref 0–0.7)
EOSINOPHIL NFR BLD AUTO: 1 %
ERYTHROCYTE [DISTWIDTH] IN BLOOD BY AUTOMATED COUNT: 13.7 % (ref 10–15)
GFR SERPL CREATININE-BSD FRML MDRD: 42 ML/MIN/1.73M2
GLUCOSE BLD-MCNC: 261 MG/DL (ref 70–99)
HCT VFR BLD AUTO: 36.5 % (ref 40–53)
HGB BLD-MCNC: 12.3 G/DL (ref 13.3–17.7)
IMM GRANULOCYTES # BLD: 0 10E3/UL
IMM GRANULOCYTES NFR BLD: 1 %
LDH SERPL L TO P-CCNC: 182 U/L (ref 85–227)
LYMPHOCYTES # BLD AUTO: 2.9 10E3/UL (ref 0.8–5.3)
LYMPHOCYTES NFR BLD AUTO: 46 %
MCH RBC QN AUTO: 32.7 PG (ref 26.5–33)
MCHC RBC AUTO-ENTMCNC: 33.7 G/DL (ref 31.5–36.5)
MCV RBC AUTO: 97 FL (ref 78–100)
MONOCYTES # BLD AUTO: 0.4 10E3/UL (ref 0–1.3)
MONOCYTES NFR BLD AUTO: 6 %
NEUTROPHILS # BLD AUTO: 3 10E3/UL (ref 1.6–8.3)
NEUTROPHILS NFR BLD AUTO: 46 %
NRBC # BLD AUTO: 0 10E3/UL
NRBC BLD AUTO-RTO: 0 /100
PLATELET # BLD AUTO: 102 10E3/UL (ref 150–450)
POTASSIUM BLD-SCNC: 4.2 MMOL/L (ref 3.4–5.3)
PROT SERPL-MCNC: 6.7 G/DL (ref 6.8–8.8)
RBC # BLD AUTO: 3.76 10E6/UL (ref 4.4–5.9)
SODIUM SERPL-SCNC: 136 MMOL/L (ref 133–144)
WBC # BLD AUTO: 6.5 10E3/UL (ref 4–11)

## 2021-08-10 PROCEDURE — 83615 LACTATE (LD) (LDH) ENZYME: CPT | Performed by: INTERNAL MEDICINE

## 2021-08-10 PROCEDURE — 82040 ASSAY OF SERUM ALBUMIN: CPT | Performed by: INTERNAL MEDICINE

## 2021-08-10 PROCEDURE — 36415 COLL VENOUS BLD VENIPUNCTURE: CPT

## 2021-08-10 PROCEDURE — 85004 AUTOMATED DIFF WBC COUNT: CPT | Performed by: INTERNAL MEDICINE

## 2021-08-10 NOTE — PROGRESS NOTES
Oral Chemotherapy Monitoring Program  Lab Follow Up    Reviewed lab results from 8/10/21.    ORAL CHEMOTHERAPY 6/10/2020 11/3/2020 11/4/2020 12/8/2020 2/8/2021 7/13/2021 8/10/2021   Assessment Type - Refill Refill Monthly Follow up Refill Lab Monitoring Lab Monitoring   Diagnosis Code - Chronic Lymphocytic Leukemia (CLL) Chronic Lymphocytic Leukemia (CLL) Chronic Lymphocytic Leukemia (CLL) Chronic Lymphocytic Leukemia (CLL) Chronic Lymphocytic Leukemia (CLL) Chronic Lymphocytic Leukemia (CLL)   Providers - Dr. Orlando Perry   [place] [facility] [place]rug Name Imbruvica (Ibrutinib) Imbruvica (Ibrutinib) - Imbruvica (Ibrutinib) Imbruvica (Ibrutinib) Imbruvica (ibrutinib) Imbruvica (ibrutinib)   Dose - 420 mg - 420 mg 420 mg 420 mg 420 mg   Current Schedule Daily Daily - Daily Daily Daily Daily   Cycle Details Continuous Continuous - Continuous Continuous Continuous Continuous   Start Date of Last Cycle - - - - - - -   Planned next cycle start date - - - 12/14/2020 - - -   Doses missed in last 2 weeks more - - 0 - 1 -   Adherence Assessment Non-adherent - - Adherent - Adherent -   Reason for Non-adherence Drug on hold - - - - - -   Adherence Intervention Recommended None - - - - - -   Adverse Effects Fatigue;Other (see note for details) - - Other (See Note for Details) Neutropenia Fatigue -   Diarrhea - - - - - - -   Pharmacist Intervention(diarrhea) - - - - - - -   Fatigue Grade 1 - - - - Grade 1 -   Pharmacist Intervention(fatigue) No - - - - No -   Neutropenia - - - - Grade 3 - -   Pharmacist Intervention(neutropenia) - - - - No - -   Other (See Note for Details) bruising - - bruising, muscle cramp - - -   Pharmacist intervention(other) No - - No - - -   Any new drug interactions? No - - No - No -   Pharmacist Intervention? - - - - - - -   Intervention(s) - - - - - - -   Is the dose as ordered appropriate for  the patient? - - - Yes - Yes -   Is the patient currently in pain? No - - No - No -   Has the patient been assessed within the past 6 months for depression? Yes - - No - Yes -   Has the patient missed any days of school, work, or other routine activity? - - - No - No -   Since the last time we talked, have you been hospitalized or used the emergency room? - - - Yes - No -   What medical service did you use? - - - Emergency Room - - -   How many emergency room visits? - - - (No Data) - - -   How many emergency room visits were related to the cancer medication? - - - 0 - - -       Labs:  _  Result Component Current Result Ref Range   Sodium 136 (8/10/2021) 133 - 144 mmol/L     _  Result Component Current Result Ref Range   Potassium 4.2 (8/10/2021) 3.4 - 5.3 mmol/L          _  Result Component Current Result Ref Range   Calcium 8.9 (8/10/2021) 8.5 - 10.1 mg/dL     _  Result Component Current Result Ref Range   Magnesium 1.9 (8/2/2021) 1.6 - 2.3 mg/dL     No results found for Phos within last 30 days.     _  Result Component Current Result Ref Range   Albumin 3.5 (8/10/2021) 3.4 - 5.0 g/dL     _  Result Component Current Result Ref Range   Urea Nitrogen 24 (8/10/2021) 7 - 30 mg/dL     _  Result Component Current Result Ref Range   Creatinine 1.56 (H) (8/10/2021) 0.66 - 1.25 mg/dL     _  Result Component Current Result Ref Range   AST 27 (8/10/2021) 0 - 45 U/L     _  Result Component Current Result Ref Range   ALT 49 (8/10/2021) 0 - 70 U/L     _  Result Component Current Result Ref Range   Bilirubin Total 0.5 (8/10/2021) 0.2 - 1.3 mg/dL     _  Result Component Current Result Ref Range   WBC Count 6.5 (8/10/2021) 4.0 - 11.0 10e3/uL     _  Result Component Current Result Ref Range   Hemoglobin 12.3 (L) (8/10/2021) 13.3 - 17.7 g/dL     _  Result Component Current Result Ref Range   Platelet Count 102 (L) (8/10/2021) 150 - 450 10e3/uL     _  Result Component Current Result Ref Range   Absolute Neutrophils 4.8 (7/30/2021)  1.6 - 8.3 10e3/uL       Assessment & Plan:  There were no concerning abnormalities.    Follow-Up:  9/2/21 labs    Marty Meese, Alayna.D., BCOP

## 2021-08-18 ENCOUNTER — OFFICE VISIT (OUTPATIENT)
Dept: CARDIOLOGY | Facility: CLINIC | Age: 81
End: 2021-08-18
Attending: PHYSICIAN ASSISTANT
Payer: COMMERCIAL

## 2021-08-18 VITALS
DIASTOLIC BLOOD PRESSURE: 70 MMHG | OXYGEN SATURATION: 97 % | HEART RATE: 80 BPM | HEIGHT: 69 IN | SYSTOLIC BLOOD PRESSURE: 152 MMHG | BODY MASS INDEX: 31.86 KG/M2 | WEIGHT: 215.1 LBS

## 2021-08-18 DIAGNOSIS — I25.10 CORONARY ARTERY DISEASE INVOLVING NATIVE CORONARY ARTERY OF NATIVE HEART WITHOUT ANGINA PECTORIS: ICD-10-CM

## 2021-08-18 DIAGNOSIS — I42.9 CARDIOMYOPATHY, UNSPECIFIED TYPE (H): ICD-10-CM

## 2021-08-18 DIAGNOSIS — I48.91 ATRIAL FIBRILLATION, UNSPECIFIED TYPE (H): Primary | ICD-10-CM

## 2021-08-18 DIAGNOSIS — I10 ESSENTIAL HYPERTENSION: ICD-10-CM

## 2021-08-18 PROCEDURE — 99215 OFFICE O/P EST HI 40 MIN: CPT | Performed by: PHYSICIAN ASSISTANT

## 2021-08-18 RX ORDER — ASPIRIN 81 MG/1
81 TABLET ORAL DAILY
COMMUNITY
End: 2021-08-18

## 2021-08-18 ASSESSMENT — MIFFLIN-ST. JEOR: SCORE: 1681.07

## 2021-08-18 NOTE — PATIENT INSTRUCTIONS
Thank you for your [facility] visit today. Your provider has recommended the following:  Medication Changes:  STOP aspirin    Recommendations:  Stress test in the next couple of weeks. We will call with results.   Echo in ~2 months  Follow-up:  See Dr Schaffer for cardiology follow up in ~2 months.    Reminder:  Please bring in your current medication list or your medication, over the counter supplements and vitamin bottles as we will review these at each office visit.

## 2021-08-18 NOTE — PROGRESS NOTES
"SERVICE DATE: 8/18/2021     Primary Cardiologist: Dr Schaffer    REASON FOR VISIT:  Austin Garza presents for hospital follow up    HISTORY OF PRESENT ILLNESS/ASSESSMENT AND PLAN:  Cardiovascular history includes history of abdominal AAA repair (EVAR on 12/4/2017, underwent percutaneous aneurysm sac puncture and endoleak embolization by IR in May 2019) on chronic Plavix previously, CKD, HTN, HLD, DM II, severe coronary calcifications on chest CT and history of CLL (counts are normal).  Patient was recently admitted with atypical nonanginal sounding chest discomfort and elevated HR.  He was noted to be in atrial fibrillation with RVR.  Echocardiogram showed EF of 45%, mid to basal inferolateral wall and the distal inferior wall appear mildly to moderately hypokinetic in some views (not noted on CHEYNANE).  He was started on rate control strategy and anticoagulation.  He underwent successful CHEYANNE guided DCCV to SR 8/2.  He was started on Eliquis prior to discharge.  Due to the ibrutinib, clopidogrel was discontinued as the bleeding risk can be unacceptably high with a combination of antiplatelet and anticoagulation while on that biologic.  Ibrutinib was put on hold.      Austin has been feeling well, no recurrent CP, no palpitations. Restarted ASA at home because he wasn't sure if he should be on it. Did meet with Oncology 8/6 and was instructed to stay off  ibrutinib for now, they will re-evaluate 9/2 at his next appt.      1. New dx atrial fibrillation with rapid ventricular s/p successful DCCV to SR.  -Appears to be in SR by exam today. No symptomatic recurrence.  -Continue metoprolol 50mg BID  -Continue Eliquis 5mg BID for now since he is less than 30d post DCCV. Per Dr Amaral \"May consider either reducing his Eliquis dose in the future or employing rhythm control strategy and discontinuing anticoagulation in the future versus watchman implantation if bleeding issues on the combination of Eliquis and ibrutinib.  If recurrent " "atrial fibrillation while on the ibrutinib, may consider adding the antiarrhythmic\"    2. New mild cardiomyopathy with EF of ~45% possible tachycardia induced versus ischemic heart disease  -No signs or symptoms of CHF or angina  -Plan for a Funmi nuc to look for ischemia in next 1-2 weeks  -Repeat limited echo in ~2 months    3. CAD/Severe coronary calcifications on chest CT  -Stress test as above  -STOP ASA. No antiplatelet while on AC due to bleeding risk detailed above.  -Continue rosuvastatin    4. Hx of AAA repair    5. History of CLL. Has been on ibrutinib since May 2020.  It was noted this can cause arrhythmias.  Usually, atrial fibrillation alone is not an absolute contraindication to ibrutinib. Did have follow up with his oncologist and ibrutinib remains on hold. Re-evaluate in Sept per their notes    6. Chronic kidney disease, stage III    7. Hypertension/diabetes/hyperlipidemia  -BP has been elevated lately. Due to time constraints we were unable to address today. Will see what it is at the time of his stress test, likely will need additional anti-HTN therapy    Follow up: 2 months with Dr Schaffer with a limited echo prior.  Of course, the patient was encouraged to contact us sooner with questions or concerns.    42 minutes spent on the date of the encounter doing chart review, patient visit and documentation     Maria Isabel Raines PA-C      CURRENT MEDICATIONS:   Current Outpatient Medications   Medication Sig Dispense Refill     ACCU-CHEK GUIDE test strip Use to test blood sugar 2 times daily or as directed. 100 strip 1     apixaban ANTICOAGULANT (ELIQUIS) 5 MG tablet Take 1 tablet (5 mg) by mouth 2 times daily 60 tablet 0     blood glucose monitoring (NO BRAND SPECIFIED) meter device kit Use to test blood sugar 2 times daily or as directed. 1 kit 0     CONTOUR NEXT TEST test strip USE TO TEST BLOOD SUGAR 1-2 TIMES DAILY OR AS DIRECTED (ADJUSTING ORAL MEDICATIONS). 100 strip 1     metFORMIN " "(GLUCOPHAGE-XR) 500 MG 24 hr tablet Take 2 tablets (1,000 mg) by mouth every morning (Patient taking differently: Take 500 mg by mouth 2 times daily (with meals) ) 180 tablet 1     metoprolol tartrate (LOPRESSOR) 50 MG tablet Take 1 tablet (50 mg) by mouth 2 times daily 60 tablet 0     Multiple Vitamins-Minerals (CENTRUM SILVER) per tablet Take 1 tablet by mouth daily       rosuvastatin (CRESTOR) 20 MG tablet Take 1 tablet (20 mg) by mouth At Bedtime 30 tablet 0     desonide (DESOWEN) 0.05 % external ointment Apply topically 2 times daily To forehead x 2 weeks (Patient not taking: Reported on 8/18/2021)       ibrutinib (IMBRUVICA) 420 MG tablet Take 1 tablet (420 mg) by mouth daily Do not take until you hear from Dr. Perry's office. (Patient not taking: Reported on 8/6/2021) 28 tablet 0       ALLERGIES:  Allergies   Allergen Reactions     Ace Inhibitors      cough       ROS:  Skin:  Negative     Eyes:  Negative    ENT:  Positive for hearing loss  Respiratory:  Positive for shortness of breath  Cardiovascular:    Positive for;dizziness;fatigue  Gastroenterology: Positive for heartburn  Genitourinary:  Positive for urinary frequency  Musculoskeletal:  Negative    Neurologic:  Positive for numbness or tingling of feet  Psychiatric:  Positive for sleep disturbances  Heme/Lymph/Imm:  Negative    Endocrine:  Positive for diabetes    PHYSICAL EXAMINATION:    GENERAL:  The patient is a pleasant 81 year old who appears their stated age.  In no apparent distress.  VITAL SIGNS:  BP (!) 152/70 (BP Location: Right arm, Patient Position: Sitting, Cuff Size: Adult Regular)   Pulse 80   Ht 1.753 m (5' 9\")   Wt 97.6 kg (215 lb 1.6 oz)   SpO2 97%   BMI 31.76 kg/m      RESPIRATORY:  Breathing is nonlabored.  Lungs are clear to auscultation bilaterally.   CARDIAC:  RRR. No murmur  NEUROLOGIC:  Alert and oriented.    DATA REVIEWED:    Component      Latest Ref Rng & Units 8/10/2021   Sodium      133 - 144 mmol/L 136   Potassium     "  3.4 - 5.3 mmol/L 4.2   Chloride      94 - 109 mmol/L 105   Carbon Dioxide      20 - 32 mmol/L 26   Anion Gap      3 - 14 mmol/L 5   Urea Nitrogen      7 - 30 mg/dL 24   Creatinine      0.66 - 1.25 mg/dL 1.56 (H)   Calcium      8.5 - 10.1 mg/dL 8.9   Glucose      70 - 99 mg/dL 261 (H)   Alkaline Phosphatase      40 - 150 U/L 62   AST      0 - 45 U/L 27   ALT      0 - 70 U/L 49   Protein Total      6.8 - 8.8 g/dL 6.7 (L)   Albumin      3.4 - 5.0 g/dL 3.5   Bilirubin Total      0.2 - 1.3 mg/dL 0.5   GFR Estimate      >60 mL/min/1.73m2 42 (L)     Component      Latest Ref Rng & Units 8/10/2021   WBC      4.0 - 11.0 10e3/uL 6.5   RBC Count      4.40 - 5.90 10e6/uL 3.76 (L)   Hemoglobin      13.3 - 17.7 g/dL 12.3 (L)   Hematocrit      40.0 - 53.0 % 36.5 (L)   MCV      78 - 100 fL 97   MCH      26.5 - 33.0 pg 32.7   MCHC      31.5 - 36.5 g/dL 33.7   RDW      10.0 - 15.0 % 13.7   Platelet Count      150 - 450 10e3/uL 102 (L)     Hospital notes as summarized above.

## 2021-08-23 ENCOUNTER — HOSPITAL ENCOUNTER (OUTPATIENT)
Facility: CLINIC | Age: 81
Setting detail: OBSERVATION
Discharge: HOME | End: 2021-08-24
Attending: EMERGENCY MEDICINE | Admitting: INTERNAL MEDICINE
Payer: COMMERCIAL

## 2021-08-23 ENCOUNTER — APPOINTMENT (OUTPATIENT)
Dept: CT IMAGING | Facility: CLINIC | Age: 81
End: 2021-08-23
Attending: EMERGENCY MEDICINE
Payer: COMMERCIAL

## 2021-08-23 DIAGNOSIS — I48.91 ATRIAL FIBRILLATION, UNSPECIFIED TYPE (H): ICD-10-CM

## 2021-08-23 DIAGNOSIS — M54.6 ACUTE MIDLINE THORACIC BACK PAIN: ICD-10-CM

## 2021-08-23 DIAGNOSIS — R07.9 CHEST PAIN, UNSPECIFIED TYPE: Primary | ICD-10-CM

## 2021-08-23 DIAGNOSIS — C91.10 CLL (CHRONIC LYMPHOCYTIC LEUKEMIA) (H): ICD-10-CM

## 2021-08-23 LAB
ALBUMIN SERPL-MCNC: 3.7 G/DL (ref 3.4–5)
ALP SERPL-CCNC: 69 U/L (ref 40–150)
ALT SERPL W P-5'-P-CCNC: 33 U/L (ref 0–70)
ANION GAP SERPL CALCULATED.3IONS-SCNC: 4 MMOL/L (ref 3–14)
AST SERPL W P-5'-P-CCNC: 13 U/L (ref 0–45)
BILIRUB SERPL-MCNC: 0.9 MG/DL (ref 0.2–1.3)
BUN SERPL-MCNC: 27 MG/DL (ref 7–30)
CALCIUM SERPL-MCNC: 8.9 MG/DL (ref 8.5–10.1)
CHLORIDE BLD-SCNC: 107 MMOL/L (ref 94–109)
CO2 SERPL-SCNC: 26 MMOL/L (ref 20–32)
CREAT SERPL-MCNC: 1.44 MG/DL (ref 0.66–1.25)
GFR SERPL CREATININE-BSD FRML MDRD: 46 ML/MIN/1.73M2
GLUCOSE BLD-MCNC: 252 MG/DL (ref 70–99)
LACTATE SERPL-SCNC: 0.9 MMOL/L (ref 0.7–2)
PLAT MORPH BLD: NORMAL
POTASSIUM BLD-SCNC: 4 MMOL/L (ref 3.4–5.3)
PROT SERPL-MCNC: 6.9 G/DL (ref 6.8–8.8)
RBC MORPH BLD: NORMAL
SARS-COV-2 RNA RESP QL NAA+PROBE: NEGATIVE
SODIUM SERPL-SCNC: 137 MMOL/L (ref 133–144)
TROPONIN I SERPL-MCNC: <0.015 UG/L (ref 0–0.04)

## 2021-08-23 PROCEDURE — 85025 COMPLETE CBC W/AUTO DIFF WBC: CPT | Performed by: EMERGENCY MEDICINE

## 2021-08-23 PROCEDURE — 93005 ELECTROCARDIOGRAM TRACING: CPT

## 2021-08-23 PROCEDURE — 87635 SARS-COV-2 COVID-19 AMP PRB: CPT | Performed by: EMERGENCY MEDICINE

## 2021-08-23 PROCEDURE — 99220 PR INITIAL OBSERVATION CARE,LEVEL III: CPT | Performed by: INTERNAL MEDICINE

## 2021-08-23 PROCEDURE — 83605 ASSAY OF LACTIC ACID: CPT | Performed by: EMERGENCY MEDICINE

## 2021-08-23 PROCEDURE — G0378 HOSPITAL OBSERVATION PER HR: HCPCS

## 2021-08-23 PROCEDURE — 84484 ASSAY OF TROPONIN QUANT: CPT | Performed by: EMERGENCY MEDICINE

## 2021-08-23 PROCEDURE — C9803 HOPD COVID-19 SPEC COLLECT: HCPCS

## 2021-08-23 PROCEDURE — 250N000011 HC RX IP 250 OP 636: Performed by: EMERGENCY MEDICINE

## 2021-08-23 PROCEDURE — 74177 CT ABD & PELVIS W/CONTRAST: CPT

## 2021-08-23 PROCEDURE — 96374 THER/PROPH/DIAG INJ IV PUSH: CPT

## 2021-08-23 PROCEDURE — 82040 ASSAY OF SERUM ALBUMIN: CPT | Performed by: EMERGENCY MEDICINE

## 2021-08-23 PROCEDURE — 99285 EMERGENCY DEPT VISIT HI MDM: CPT | Mod: 25

## 2021-08-23 PROCEDURE — 250N000009 HC RX 250: Performed by: EMERGENCY MEDICINE

## 2021-08-23 PROCEDURE — 36415 COLL VENOUS BLD VENIPUNCTURE: CPT | Performed by: EMERGENCY MEDICINE

## 2021-08-23 RX ORDER — PROCHLORPERAZINE MALEATE 5 MG
5 TABLET ORAL EVERY 6 HOURS PRN
Status: DISCONTINUED | OUTPATIENT
Start: 2021-08-23 | End: 2021-08-24 | Stop reason: HOSPADM

## 2021-08-23 RX ORDER — FENTANYL CITRATE 50 UG/ML
50 INJECTION, SOLUTION INTRAMUSCULAR; INTRAVENOUS ONCE
Status: COMPLETED | OUTPATIENT
Start: 2021-08-23 | End: 2021-08-23

## 2021-08-23 RX ORDER — DEXTROSE MONOHYDRATE 25 G/50ML
25-50 INJECTION, SOLUTION INTRAVENOUS
Status: DISCONTINUED | OUTPATIENT
Start: 2021-08-23 | End: 2021-08-24 | Stop reason: HOSPADM

## 2021-08-23 RX ORDER — NICOTINE POLACRILEX 4 MG
15-30 LOZENGE BUCCAL
Status: DISCONTINUED | OUTPATIENT
Start: 2021-08-23 | End: 2021-08-24 | Stop reason: HOSPADM

## 2021-08-23 RX ORDER — NALOXONE HYDROCHLORIDE 0.4 MG/ML
0.2 INJECTION, SOLUTION INTRAMUSCULAR; INTRAVENOUS; SUBCUTANEOUS
Status: DISCONTINUED | OUTPATIENT
Start: 2021-08-23 | End: 2021-08-24 | Stop reason: HOSPADM

## 2021-08-23 RX ORDER — BISACODYL 10 MG
10 SUPPOSITORY, RECTAL RECTAL DAILY PRN
Status: DISCONTINUED | OUTPATIENT
Start: 2021-08-23 | End: 2021-08-24 | Stop reason: HOSPADM

## 2021-08-23 RX ORDER — NALOXONE HYDROCHLORIDE 0.4 MG/ML
0.4 INJECTION, SOLUTION INTRAMUSCULAR; INTRAVENOUS; SUBCUTANEOUS
Status: DISCONTINUED | OUTPATIENT
Start: 2021-08-23 | End: 2021-08-24 | Stop reason: HOSPADM

## 2021-08-23 RX ORDER — LANOLIN ALCOHOL/MO/W.PET/CERES
3 CREAM (GRAM) TOPICAL
Status: DISCONTINUED | OUTPATIENT
Start: 2021-08-23 | End: 2021-08-24 | Stop reason: HOSPADM

## 2021-08-23 RX ORDER — ONDANSETRON 2 MG/ML
4 INJECTION INTRAMUSCULAR; INTRAVENOUS EVERY 6 HOURS PRN
Status: DISCONTINUED | OUTPATIENT
Start: 2021-08-23 | End: 2021-08-24 | Stop reason: HOSPADM

## 2021-08-23 RX ORDER — AMOXICILLIN 250 MG
2 CAPSULE ORAL 2 TIMES DAILY PRN
Status: DISCONTINUED | OUTPATIENT
Start: 2021-08-23 | End: 2021-08-24 | Stop reason: HOSPADM

## 2021-08-23 RX ORDER — POLYETHYLENE GLYCOL 3350 17 G/17G
17 POWDER, FOR SOLUTION ORAL DAILY PRN
Status: DISCONTINUED | OUTPATIENT
Start: 2021-08-23 | End: 2021-08-24 | Stop reason: HOSPADM

## 2021-08-23 RX ORDER — AMOXICILLIN 250 MG
1 CAPSULE ORAL 2 TIMES DAILY PRN
Status: DISCONTINUED | OUTPATIENT
Start: 2021-08-23 | End: 2021-08-24 | Stop reason: HOSPADM

## 2021-08-23 RX ORDER — METFORMIN HCL 500 MG
500 TABLET, EXTENDED RELEASE 24 HR ORAL 2 TIMES DAILY WITH MEALS
Status: DISCONTINUED | OUTPATIENT
Start: 2021-08-24 | End: 2021-08-24 | Stop reason: HOSPADM

## 2021-08-23 RX ORDER — ACETAMINOPHEN 650 MG/1
650 SUPPOSITORY RECTAL EVERY 6 HOURS PRN
Status: DISCONTINUED | OUTPATIENT
Start: 2021-08-23 | End: 2021-08-24 | Stop reason: HOSPADM

## 2021-08-23 RX ORDER — METOPROLOL TARTRATE 50 MG
50 TABLET ORAL 2 TIMES DAILY
Status: DISCONTINUED | OUTPATIENT
Start: 2021-08-23 | End: 2021-08-24 | Stop reason: HOSPADM

## 2021-08-23 RX ORDER — LIDOCAINE 4 G/G
1 PATCH TOPICAL
Status: DISCONTINUED | OUTPATIENT
Start: 2021-08-24 | End: 2021-08-24 | Stop reason: HOSPADM

## 2021-08-23 RX ORDER — ONDANSETRON 4 MG/1
4 TABLET, ORALLY DISINTEGRATING ORAL EVERY 6 HOURS PRN
Status: DISCONTINUED | OUTPATIENT
Start: 2021-08-23 | End: 2021-08-24 | Stop reason: HOSPADM

## 2021-08-23 RX ORDER — ACETAMINOPHEN 325 MG/1
650 TABLET ORAL EVERY 6 HOURS PRN
Status: DISCONTINUED | OUTPATIENT
Start: 2021-08-23 | End: 2021-08-24 | Stop reason: HOSPADM

## 2021-08-23 RX ORDER — PROCHLORPERAZINE 25 MG
12.5 SUPPOSITORY, RECTAL RECTAL EVERY 12 HOURS PRN
Status: DISCONTINUED | OUTPATIENT
Start: 2021-08-23 | End: 2021-08-24 | Stop reason: HOSPADM

## 2021-08-23 RX ORDER — IOPAMIDOL 755 MG/ML
500 INJECTION, SOLUTION INTRAVASCULAR ONCE
Status: COMPLETED | OUTPATIENT
Start: 2021-08-23 | End: 2021-08-23

## 2021-08-23 RX ORDER — ROSUVASTATIN CALCIUM 5 MG/1
20 TABLET, COATED ORAL AT BEDTIME
Status: DISCONTINUED | OUTPATIENT
Start: 2021-08-23 | End: 2021-08-24 | Stop reason: HOSPADM

## 2021-08-23 RX ADMIN — SODIUM CHLORIDE 57 ML: 9 INJECTION, SOLUTION INTRAVENOUS at 18:12

## 2021-08-23 RX ADMIN — IOPAMIDOL 79 ML: 755 INJECTION, SOLUTION INTRAVENOUS at 18:12

## 2021-08-23 RX ADMIN — FENTANYL CITRATE 50 MCG: 50 INJECTION, SOLUTION INTRAMUSCULAR; INTRAVENOUS at 19:17

## 2021-08-23 ASSESSMENT — ENCOUNTER SYMPTOMS: BACK PAIN: 1

## 2021-08-23 NOTE — ED PROVIDER NOTES
History   Chief Complaint:  Back Pain       HPI   Austin Garza is a 81 year old male, on Eliis, with history of diabetes, abdominal aortic aneurysm who presents with back pain. The patient states that he has been experiencing back pain between his shoulder blades beginning in the middle of the day yesterday. He also reports some chest pain, but notes that the pain is radiating from his back. The pain is worsened with movement, as well as progressively worsening. His pain prevented him from finding a comfortable position to sleep on his back, side, or abdomen, so the patient reports falling asleep while sitting upright about 13 hours ago. The patient went to the [facility]'s sauna this morning, which helped. He also took a Tylenol at 0900, 1200, and 1500 today. The patient states that the pain worsened noticeably about 5 hours ago. He then went to the chiropractor, which relieved his pain for about 1 hour. The patient also reports receiving 50 mcg Fentanyl en route to the emergency department, which he states has relieved his pain. Of note, the patient reports a similar pain in the middle of the night about 3 weeks ago. He states that his pain today was approaching the same severity of this episode. He presented to the emergency department at that time with an irregular and rapid heart rate, and reports atrial fibrillation. He also notes a history of AAA repair. The patient denies a history of myocardial infarction or blood clots. His pain did not worsen after eating lunch today.    Review of Systems   Constitutional: Negative for fever.   Respiratory: Negative for cough and shortness of breath.    Cardiovascular: Positive for chest pain.   Gastrointestinal: Negative for abdominal pain, nausea and vomiting.   Musculoskeletal: Positive for back pain.   All other systems reviewed and are negative.      Allergies:  Ace  inhibitors    Medications:  Eliquis  Ibrutinib  Metformin  Lopressor  Rosuvastatin  Glimepiride    Past Medical History:    Abdominal aortic aneurysm  Atrial fibrillation  Anemia  Basal cell carcinoma of face skin  Chronic lymphocytic leukemia  Chronic kidney disease  Diaphragmatic hernia  Diverticulitis of colon  Esophageal reflux  Hypertension  Hyperlipidemia  Hypoglycemia  Irritable bowel syndrome  Macular degeneration of retina  Overweight  Back squamous cell carcinoma  Diabetes  Hearing loss    Past Surgical History:    Anesthesia cardioversion  Open appendectomy  Bone marrow biopsy  Colonoscopy  AAA endovascular repair  Fistulotomy with marsupialization for repair of fisture in ano  Abdominal aortogram  Visceral embolization  Resection of multiple skin tags  Squamous cell skin cancer resection    Family History:    Cerebrovascular disease x2  Hypertension  Coronary artery disease  Skin cancer  Glaucoma    Social History:  Arrives to the emergency department with his wife    Physical Exam     Patient Vitals for the past 24 hrs:   BP Temp Temp src Pulse Resp SpO2   08/23/21 1800 (!) 183/103 -- -- 104 -- 96 %   08/23/21 1745 (!) 181/106 -- -- 104 -- 96 %   08/23/21 1730 (!) 172/104 -- -- 103 -- 95 %   08/23/21 1715 (!) 178/97 -- -- 103 -- 97 %   08/23/21 1700 (!) 163/91 -- -- 99 -- 96 %   08/23/21 1632 (!) 179/94 97.4  F (36.3  C) Temporal 97 18 99 %       Physical Exam    Nursing note and vitals reviewed.  Constitutional: Cooperative.   HENT:   Mouth/Throat: Moist mucous membranes.   Eyes: EOMI, nonicteric sclera  Cardiovascular: Normal rate, regular rhythm, no murmurs, rubs, or gallops  Pulmonary/Chest: Effort normal and breath sounds normal. No respiratory distress. No wheezes. No rales.   Abdominal: Soft. Nontender, nondistended, no guarding or rigidity.   Musculoskeletal: Normal range of motion. No pain to palpation C/T/L spine. No pain to palpation bilateral parathoracic space.  Neurological: Alert. Moves  all extremities spontaneously.   Skin: Skin is warm and dry. No rash noted.   Psychiatric: Normal mood and affect.     Emergency Department Course   ECG  ECG taken at 1657, ECG read at 1701  Normal sinus rhythm  Minimal voltage criteria for LVH, may be normal variant  Inferior infarct, age undetermined   No significant change as compared to prior, dated 08/02/21.  Rate 98 bpm. MO interval 206 ms. QRS duration 74 ms. QT/QTc 336/428 ms. P-R-T axes 48 -8 17.     Imaging:  CT Aortic Survey w Contrast  IMPRESSION:  1.  Patent thoracic aorta, though with ascending aorta measuring up to 4.1 cm AP x 4 cm transverse.   2.  Aortobiiliac stent graft, though with limited visualization due to embolic material within and adjacent to the aneurysmal sac of the abdominal aorta. Sac size is estimated to be 6.7 x 7.5 cm and relatively unchanged from previous exam, though these   measurements are limited as is assessment for endoleak.   3.  No visible dissection in the native thoracic aorta or otherwise no visible acute aortic pathology in the visible portions.   4.  Sigmoid diverticulosis with a small portion of the sigmoid colon at the internal inguinal canal on the left, perhaps slightly extending into the inguinal canal hernia, though no evidence of bowel obstruction.   5.  Scattered and mostly linear bibasilar pulmonary opacities most suggestive of atelectasis. Clinical correlation would be necessary for any possibility of infection.   6.  Irregular area in the right lateral aspect of L2 vertebral body with mixed area of sclerosis and lucency measuring approximately 2.6 x 2.1 cm. This may be a complex endplate defect, though uncertain if related to back pain. Appearance is relatively   unchanged from previous exam.   7.  Uncomplicated cholelithiasis.   Reading per radiology.    Imaging independently reviewed and agree with radiologist interpretation.     Laboratory:  CBC: WBC 6.5, HGB 11.7, PLT 40 (LL)  CMP: Creatinine 1.44 (H)  Glucose 252 (H) GFR estimate 46 (L) o/w WNL     Troponin (Collected 1737): <0.015    Lactic acid (result time 1754) 0.9     Emergency Department Course:    Reviewed:  I reviewed nursing notes, vitals and past medical history    Assessments:  1714 I obtained history and examined the patient as noted above.   2018 I rechecked the patient and explained findings.     Consults:   2103 I spoke with Dr. Tapia, hospitalist, who agreed to admit the patient.    Interventions:  1917 Fentanyl 50mcg IV     Disposition:  The patient was admitted to the hospital under the care of Dr. Tapia.       Impression & Plan   Medical Decision Making:  Patient presents with midthoracic back pain.  It radiates anteriorly through his chest.  Patient reports similar pain about 3 weeks ago, but at that time he had A. fib with RVR, and currently he is in normal sinus rhythm.  Pain has been getting progressively worse throughout the day and improves with fentanyl.  EKG nonischemic.  Troponin negative.  CT of the chest was obtained to evaluate for aortic pathology which was fortunately negative.  This pain seems musculoskeletal in nature, but with escalating pain, patient would prefer observation hospitalization which I think is reasonable.  Cardiology appointment from 5 days ago had recommended a Lexiscan in 1 to 2 weeks and I think it is appropriate to obtain this in the hospital.  Patient and wife are in agreement with this plan.  Patient admitted in stable condition.  All questions answered.  Dr. Tapia accepts for admission.    Diagnosis:    ICD-10-CM    1. Acute midline thoracic back pain  M54.6        Scribe Disclosure:  I, Jd Fernandez, am serving as a scribe at 5:00 PM on 8/23/2021 to document services personally performed by Gary Church MD based on my observations and the provider's statements to me.          Gary Church MD  08/24/21 0425

## 2021-08-24 ENCOUNTER — APPOINTMENT (OUTPATIENT)
Dept: NUCLEAR MEDICINE | Facility: CLINIC | Age: 81
End: 2021-08-24
Attending: INTERNAL MEDICINE
Payer: COMMERCIAL

## 2021-08-24 VITALS
WEIGHT: 209 LBS | HEART RATE: 83 BPM | TEMPERATURE: 97.9 F | DIASTOLIC BLOOD PRESSURE: 82 MMHG | RESPIRATION RATE: 16 BRPM | SYSTOLIC BLOOD PRESSURE: 141 MMHG | OXYGEN SATURATION: 95 % | BODY MASS INDEX: 30.86 KG/M2

## 2021-08-24 LAB
ANION GAP SERPL CALCULATED.3IONS-SCNC: 6 MMOL/L (ref 3–14)
ATRIAL RATE - MUSE: 98 BPM
BASOPHILS # BLD AUTO: 0 10E3/UL (ref 0–0.2)
BASOPHILS # BLD AUTO: 0 10E3/UL (ref 0–0.2)
BASOPHILS NFR BLD AUTO: 0 %
BASOPHILS NFR BLD AUTO: 0 %
BUN SERPL-MCNC: 21 MG/DL (ref 7–30)
CALCIUM SERPL-MCNC: 8.6 MG/DL (ref 8.5–10.1)
CHLORIDE BLD-SCNC: 106 MMOL/L (ref 94–109)
CO2 SERPL-SCNC: 23 MMOL/L (ref 20–32)
CREAT SERPL-MCNC: 1.41 MG/DL (ref 0.66–1.25)
CV STRESS MAX HR HE: 98
DIASTOLIC BLOOD PRESSURE - MUSE: NORMAL MMHG
EOSINOPHIL # BLD AUTO: 0 10E3/UL (ref 0–0.7)
EOSINOPHIL # BLD AUTO: 0.1 10E3/UL (ref 0–0.7)
EOSINOPHIL NFR BLD AUTO: 1 %
EOSINOPHIL NFR BLD AUTO: 2 %
ERYTHROCYTE [DISTWIDTH] IN BLOOD BY AUTOMATED COUNT: 14.4 % (ref 10–15)
ERYTHROCYTE [DISTWIDTH] IN BLOOD BY AUTOMATED COUNT: 14.5 % (ref 10–15)
ERYTHROCYTE [DISTWIDTH] IN BLOOD BY AUTOMATED COUNT: 14.6 % (ref 10–15)
GFR SERPL CREATININE-BSD FRML MDRD: 47 ML/MIN/1.73M2
GLUCOSE BLD-MCNC: 209 MG/DL (ref 70–99)
GLUCOSE BLDC GLUCOMTR-MCNC: 216 MG/DL (ref 70–99)
GLUCOSE BLDC GLUCOMTR-MCNC: 227 MG/DL (ref 70–99)
GLUCOSE BLDC GLUCOMTR-MCNC: 236 MG/DL (ref 70–99)
GLUCOSE BLDC GLUCOMTR-MCNC: 258 MG/DL (ref 70–99)
HCT VFR BLD AUTO: 33.8 % (ref 40–53)
HCT VFR BLD AUTO: 34.3 % (ref 40–53)
HCT VFR BLD AUTO: 34.7 % (ref 40–53)
HGB BLD-MCNC: 11 G/DL (ref 13.3–17.7)
HGB BLD-MCNC: 11.5 G/DL (ref 13.3–17.7)
HGB BLD-MCNC: 11.7 G/DL (ref 13.3–17.7)
IMM GRANULOCYTES # BLD: 0 10E3/UL
IMM GRANULOCYTES # BLD: 0 10E3/UL
IMM GRANULOCYTES NFR BLD: 0 %
IMM GRANULOCYTES NFR BLD: 0 %
INTERPRETATION ECG - MUSE: NORMAL
LYMPHOCYTES # BLD AUTO: 1.7 10E3/UL (ref 0.8–5.3)
LYMPHOCYTES # BLD AUTO: 2.2 10E3/UL (ref 0.8–5.3)
LYMPHOCYTES NFR BLD AUTO: 32 %
LYMPHOCYTES NFR BLD AUTO: 33 %
MCH RBC QN AUTO: 31.3 PG (ref 26.5–33)
MCH RBC QN AUTO: 32.6 PG (ref 26.5–33)
MCH RBC QN AUTO: 32.6 PG (ref 26.5–33)
MCHC RBC AUTO-ENTMCNC: 32.5 G/DL (ref 31.5–36.5)
MCHC RBC AUTO-ENTMCNC: 33.5 G/DL (ref 31.5–36.5)
MCHC RBC AUTO-ENTMCNC: 33.7 G/DL (ref 31.5–36.5)
MCV RBC AUTO: 96 FL (ref 78–100)
MCV RBC AUTO: 97 FL (ref 78–100)
MCV RBC AUTO: 97 FL (ref 78–100)
MONOCYTES # BLD AUTO: 0.4 10E3/UL (ref 0–1.3)
MONOCYTES # BLD AUTO: 0.5 10E3/UL (ref 0–1.3)
MONOCYTES NFR BLD AUTO: 6 %
MONOCYTES NFR BLD AUTO: 8 %
NEUTROPHILS # BLD AUTO: 3.1 10E3/UL (ref 1.6–8.3)
NEUTROPHILS # BLD AUTO: 3.9 10E3/UL (ref 1.6–8.3)
NEUTROPHILS NFR BLD AUTO: 58 %
NEUTROPHILS NFR BLD AUTO: 60 %
NRBC # BLD AUTO: 0 10E3/UL
NRBC # BLD AUTO: 0 10E3/UL
NRBC BLD AUTO-RTO: 0 /100
NRBC BLD AUTO-RTO: 0 /100
NUC STRESS EJECTION FRACTION: 52 %
P AXIS - MUSE: 48 DEGREES
PLATELET # BLD AUTO: 40 10E3/UL (ref 150–450)
PLATELET # BLD AUTO: 44 10E3/UL (ref 150–450)
PLATELET # BLD AUTO: 46 10E3/UL (ref 150–450)
POTASSIUM BLD-SCNC: 3.9 MMOL/L (ref 3.4–5.3)
PR INTERVAL - MUSE: 206 MS
QRS DURATION - MUSE: 74 MS
QT - MUSE: 336 MS
QTC - MUSE: 428 MS
R AXIS - MUSE: -8 DEGREES
RATE PRESSURE PRODUCT: NORMAL
RBC # BLD AUTO: 3.52 10E6/UL (ref 4.4–5.9)
RBC # BLD AUTO: 3.53 10E6/UL (ref 4.4–5.9)
RBC # BLD AUTO: 3.59 10E6/UL (ref 4.4–5.9)
RETICS # AUTO: 0.09 10E6/UL (ref 0.03–0.1)
RETICS/RBC NFR AUTO: 2.6 % (ref 0.5–2)
SODIUM SERPL-SCNC: 135 MMOL/L (ref 133–144)
STRESS ECHO BASELINE DIASTOLIC HE: 72
STRESS ECHO BASELINE HR: 75
STRESS ECHO BASELINE SYSTOLIC BP: 143
STRESS ECHO CALCULATED PERCENT HR: 70 %
STRESS ECHO LAST STRESS DIASTOLIC BP: 74
STRESS ECHO LAST STRESS SYSTOLIC BP: 135
STRESS ECHO TARGET HR: 141
SYSTOLIC BLOOD PRESSURE - MUSE: NORMAL MMHG
T AXIS - MUSE: 17 DEGREES
TROPONIN I SERPL-MCNC: <0.015 UG/L (ref 0–0.04)
VENTRICULAR RATE- MUSE: 98 BPM
WBC # BLD AUTO: 5.4 10E3/UL (ref 4–11)
WBC # BLD AUTO: 5.9 10E3/UL (ref 4–11)
WBC # BLD AUTO: 6.5 10E3/UL (ref 4–11)

## 2021-08-24 PROCEDURE — 85025 COMPLETE CBC W/AUTO DIFF WBC: CPT | Performed by: INTERNAL MEDICINE

## 2021-08-24 PROCEDURE — 78452 HT MUSCLE IMAGE SPECT MULT: CPT

## 2021-08-24 PROCEDURE — 85045 AUTOMATED RETICULOCYTE COUNT: CPT | Performed by: INTERNAL MEDICINE

## 2021-08-24 PROCEDURE — 250N000011 HC RX IP 250 OP 636

## 2021-08-24 PROCEDURE — 99217 PR OBSERVATION CARE DISCHARGE: CPT | Performed by: INTERNAL MEDICINE

## 2021-08-24 PROCEDURE — 36415 COLL VENOUS BLD VENIPUNCTURE: CPT | Performed by: INTERNAL MEDICINE

## 2021-08-24 PROCEDURE — 96372 THER/PROPH/DIAG INJ SC/IM: CPT | Mod: XU | Performed by: INTERNAL MEDICINE

## 2021-08-24 PROCEDURE — A9502 TC99M TETROFOSMIN: HCPCS | Performed by: INTERNAL MEDICINE

## 2021-08-24 PROCEDURE — 80048 BASIC METABOLIC PNL TOTAL CA: CPT | Performed by: INTERNAL MEDICINE

## 2021-08-24 PROCEDURE — 78452 HT MUSCLE IMAGE SPECT MULT: CPT | Mod: 26 | Performed by: INTERNAL MEDICINE

## 2021-08-24 PROCEDURE — 85027 COMPLETE CBC AUTOMATED: CPT | Performed by: INTERNAL MEDICINE

## 2021-08-24 PROCEDURE — 84484 ASSAY OF TROPONIN QUANT: CPT | Performed by: INTERNAL MEDICINE

## 2021-08-24 PROCEDURE — G0378 HOSPITAL OBSERVATION PER HR: HCPCS

## 2021-08-24 PROCEDURE — 250N000012 HC RX MED GY IP 250 OP 636 PS 637: Performed by: INTERNAL MEDICINE

## 2021-08-24 PROCEDURE — 93016 CV STRESS TEST SUPVJ ONLY: CPT | Performed by: INTERNAL MEDICINE

## 2021-08-24 PROCEDURE — 93017 CV STRESS TEST TRACING ONLY: CPT

## 2021-08-24 PROCEDURE — 250N000013 HC RX MED GY IP 250 OP 250 PS 637: Performed by: INTERNAL MEDICINE

## 2021-08-24 PROCEDURE — 343N000001 HC RX 343: Performed by: INTERNAL MEDICINE

## 2021-08-24 PROCEDURE — 93018 CV STRESS TEST I&R ONLY: CPT | Performed by: INTERNAL MEDICINE

## 2021-08-24 RX ORDER — ACYCLOVIR 200 MG/1
0-1 CAPSULE ORAL
Status: DISCONTINUED | OUTPATIENT
Start: 2021-08-24 | End: 2021-08-24 | Stop reason: HOSPADM

## 2021-08-24 RX ORDER — ALBUTEROL SULFATE 90 UG/1
2 AEROSOL, METERED RESPIRATORY (INHALATION) EVERY 5 MIN PRN
Status: DISCONTINUED | OUTPATIENT
Start: 2021-08-24 | End: 2021-08-24 | Stop reason: HOSPADM

## 2021-08-24 RX ORDER — REGADENOSON 0.08 MG/ML
0.4 INJECTION, SOLUTION INTRAVENOUS ONCE
Status: COMPLETED | OUTPATIENT
Start: 2021-08-24 | End: 2021-08-24

## 2021-08-24 RX ORDER — REGADENOSON 0.08 MG/ML
INJECTION, SOLUTION INTRAVENOUS
Status: COMPLETED
Start: 2021-08-24 | End: 2021-08-24

## 2021-08-24 RX ORDER — AMINOPHYLLINE 25 MG/ML
50-100 INJECTION, SOLUTION INTRAVENOUS
Status: DISCONTINUED | OUTPATIENT
Start: 2021-08-24 | End: 2021-08-24 | Stop reason: HOSPADM

## 2021-08-24 RX ADMIN — METOPROLOL TARTRATE 50 MG: 50 TABLET, FILM COATED ORAL at 00:19

## 2021-08-24 RX ADMIN — METOPROLOL TARTRATE 50 MG: 50 TABLET, FILM COATED ORAL at 08:54

## 2021-08-24 RX ADMIN — ROSUVASTATIN CALCIUM 20 MG: 5 TABLET, FILM COATED ORAL at 00:19

## 2021-08-24 RX ADMIN — INSULIN ASPART 2 UNITS: 100 INJECTION, SOLUTION INTRAVENOUS; SUBCUTANEOUS at 00:34

## 2021-08-24 RX ADMIN — TETROFOSMIN 33 MCI.: 1.38 INJECTION, POWDER, LYOPHILIZED, FOR SOLUTION INTRAVENOUS at 11:42

## 2021-08-24 RX ADMIN — REGADENOSON 0.4 MG: 0.08 INJECTION, SOLUTION INTRAVENOUS at 11:39

## 2021-08-24 RX ADMIN — TETROFOSMIN 11 MCI.: 1.38 INJECTION, POWDER, LYOPHILIZED, FOR SOLUTION INTRAVENOUS at 10:05

## 2021-08-24 ASSESSMENT — ENCOUNTER SYMPTOMS
SHORTNESS OF BREATH: 0
VOMITING: 0
ABDOMINAL PAIN: 0
COUGH: 0
NAUSEA: 0
FEVER: 0

## 2021-08-24 NOTE — DISCHARGE INSTRUCTIONS
[facility] will call you to schedule the CT angiogram within 2 business days.   If you dont hear from them, their phone number is #[phone]

## 2021-08-24 NOTE — PROGRESS NOTES
ROOM # [id number]    Living Situation (if not independent, order SW consult): Home w/ spouse  Facility name:  : Wife Juan Jack [phone]    Activity level at baseline: Ind  Activity level on admit: SBA      Patient registered to observation; given Patient Bill of Rights; given the opportunity to ask questions about observation status and their plan of care.  Patient has been oriented to the observation room, bathroom and call light is in place.    Discussed discharge goals and expectations with patient/family.

## 2021-08-24 NOTE — PHARMACY-ADMISSION MEDICATION HISTORY
Admission medication history interview status for this patient is complete. See [facility] admission navigator for allergy information, prior to admission medications and immunization status.     Medication history interview done, indicate source(s): Patient  Medication history resources (including written lists, pill bottles, clinic record):[facility] list  Pharmacy: [facility], [place]    Changes made to PTA medication list:  Added: None  Changed: Metformin-XR to 500mg bid  Reported as Not Taking: Desowen  Removed: None    Actions taken by pharmacist (provider contacted, etc):None     Additional medication history information: Pt was admitted ~1 year ago with severe hypoglycemia (bg 40) from amaryl (dose too high)    Medication reconciliation/reorder completed by provider prior to medication history?  N   (Y/N)        Prior to Admission medications    Medication Sig Last Dose Taking? Auth Provider   apixaban ANTICOAGULANT (ELIQUIS) 5 MG tablet Take 1 tablet (5 mg) by mouth 2 times daily 8/23/2021 at Unknown time Yes Yaya Mir MD   metoprolol tartrate (LOPRESSOR) 50 MG tablet Take 1 tablet (50 mg) by mouth 2 times daily 8/23/2021 at Unknown time Yes Yaya Mir MD   Multiple Vitamins-Minerals (CENTRUM SILVER) per tablet Take 1 tablet by mouth daily 8/23/2021 at Unknown time Yes Reported, Patient   ACCU-CHEK GUIDE test strip Use to test blood sugar 2 times daily or as directed.   Vanessa Munson MD   blood glucose monitoring (NO BRAND SPECIFIED) meter device kit Use to test blood sugar 2 times daily or as directed.   Vanessa Munson MD   CONTOUR NEXT TEST test strip USE TO TEST BLOOD SUGAR 1-2 TIMES DAILY OR AS DIRECTED (ADJUSTING ORAL MEDICATIONS).   Elizabeth Alaniz, APRN CNP   desonide (DESOWEN) 0.05 % external ointment Apply topically 2 times daily To forehead x 2 weeks  Patient not taking: Reported on 8/18/2021   Unknown, Entered By History   ibrutinib (IMBRUVICA) 420 MG tablet Take 1  tablet (420 mg) by mouth daily Do not take until you hear from Dr. Perry's office.  Patient not taking: Reported on 8/6/2021   Yaya Mir MD   metFORMIN (GLUCOPHAGE-XR) 500 MG 24 hr tablet Take 2 tablets (1,000 mg) by mouth every morning  Patient taking differently: Take 500 mg by mouth 2 times daily (with meals)    Vanessa Munson MD   rosuvastatin (CRESTOR) 20 MG tablet Take 1 tablet (20 mg) by mouth At Bedtime  Patient taking differently: Take 20 mg by mouth daily    Yaya Mir MD

## 2021-08-24 NOTE — PLAN OF CARE
PRIMARY DIAGNOSIS: BACK PAIN  OUTPATIENT/OBSERVATION GOALS TO BE MET BEFORE DISCHARGE:  1. Pain Status: Improved-controlled with oral pain medications.    2. Return to near baseline physical activity: Yes    3. Cleared for discharge by consultants (if involved): No    Discharge Planner Nurse   Safe discharge environment identified: Yes  Barriers to discharge: Yes       Entered by: Axel Thompson 08/24/2021 12:45 AM  BP (!) 148/87 (BP Location: Left arm)   Pulse 105   Temp 98.5  F (36.9  C) (Oral)   Resp 16   Wt 95 kg (209 lb 6.4 oz)   SpO2 95%   BMI 30.92 kg/m    Admitted 2300, received HS meds. Resting peacefully, will continue to provide supportive cares.  Please review provider order for any additional goals.   Nurse to notify provider when observation goals have been met and patient is ready for discharge.

## 2021-08-24 NOTE — PROGRESS NOTES
Spoke to Dr. An about this patient.  81-year-old patient with a recent history of atrial fibrillation who was converted back to sinus rhythm after CHEYANNE directed electrical cardioversion.  Ejection fraction was in the 40 to 45% range.  The plan was to perform a nuclear stress test in the near future but the patient was admitted with musculoskeletal type of back discomfort.  Dr. An is convinced that this is noncardiac back discomfort which radiates to the chest.  The pain is associated with breathing and with movement of the thorax and the patient has ruled out for myocardial infarct.  The patient had a nuclear stress test performed today which was mildly abnormal.  There was a suggestion of nontransmural infarction in the apical and inferior segments of the left ventricle associated with a mild degree of arnoldo-infarct ischemia.  I reviewed the twelve-lead electrocardiogram and although the computer causes an inferior MI, there are is no Q waves in lead II and there is a small R wave in lead III with an intraventricular conduction defect and aVF which is against a myocardial infarct.  Also, the prior echocardiograms did not show a discrete inferior or apical wall motion abnormality.  The patient has a BMI of 31 with some truncal obesity which may account for this nuclear finding (diaphragmatic attenuation).  However of course this could represent true ischemia.  This patient has CLL and his platelet count is 44,000 which is a contraindication to cardiac catheterization because of the risk of bleeding.  Given that his symptoms are probably noncardiac and that he has ruled out for myocardial infarct, I think the best approach is to perform a coronary CT angiogram to see if significant coronary artery disease is present.  If there is significant coronary artery disease then his cardiologist can liaise with oncology hematology to see if there are strategies that would allow us to perform a coronary angiogram such as  possible platelet transfusions etc.  The CTA should be performed soon and he should have early follow-up in our cardiology clinic.  Dr. An agrees with the plan and will arrange CTA of the coronaries and the cardiology follow-up.  Of course, if the patient should develop typical anginal symptoms he is to immediately return to the emergency room.

## 2021-08-24 NOTE — PROVIDER NOTIFICATION
1:03 AM  Provider: Rebekah  Message: Need tele order per provider POC. Thank you  Response: Order placed

## 2021-08-24 NOTE — PLAN OF CARE
"PRIMARY DIAGNOSIS: BACK PAIN  OUTPATIENT/OBSERVATION GOALS TO BE MET BEFORE DISCHARGE:  1. Pain Status: Improved-controlled with oral pain medications.    2. Return to near baseline physical activity: Yes    3. Cleared for discharge by consultants (if involved): No    Discharge Planner Nurse   Safe discharge environment identified: Yes  Barriers to discharge: Yes       Entered by: Axel Thompson 08/24/2021 4:42 AM  /79 (BP Location: Left arm)   Pulse 84   Temp 98.5  F (36.9  C) (Oral)   Resp 16   Wt 95 kg (209 lb 6.4 oz)   SpO2 97%   BMI 30.92 kg/m    Pt is AxOx4 and able to make needs known. Ambulating SBA to bedside urinal. NPO for NM Funmi in AM. IV SL. Rating back pain 3/10 with radiation to ribs, declining pain intervention at this time explaining \"rest helps\". Was hypertensive upon admission, improved with scheduled Metoprolol. Will continue to provide supportive cares.  Please review provider order for any additional goals.   Nurse to notify provider when observation goals have been met and patient is ready for discharge.  "

## 2021-08-24 NOTE — PROGRESS NOTES
Care Management Discharge Note    Discharge Date: 08/24/2021     Handoff Referral Completed: No, no indication for need    Additional Information:  Pt identified as a Service Bundle #3. No needs or assessment needed at this time. Please consult CM/SW  if discharge needs should arise.      Sandi Tomlinson, RN      Sandi Tomlinson RN Case Manager  Inpatient Care Coordination  [facility]   [phone]

## 2021-08-24 NOTE — DISCHARGE SUMMARY
[facility]  Discharge Summary  Name: Austin Garza    MRN: [id number]  YOB: 1940    Age: 81 year old  Date of Discharge:  8/24/2021  Date of Admission: 8/23/2021  Primary Care Provider: Vanessa Munson  Discharge Physician:  Margie An MD  Discharging Service:  Hospitalist      Discharge Diagnoses:  1. Upper Back Pain  2. Thrombocytopenia in the settling of CLL with recent discontinuation of Ibrutinib due to atrial fibrillation  3. Recently diagnosed Atrial Fibrillation  4. DM2     Follow-ups Needed After Discharge   Follow up with Oncology in clinic in next week as already scheduled    Unresulted Labs Ordered in the Past 30 Days of this Admission     No orders found from 10/16/2018 to 12/16/2018.        Hospital Course:  Austin Sherman is a 81-year-old male with history of CLL, atrial fibrillation, abdominal aortic aneurysm, DM2, hypertension, hypercholesterolemia, GERD who was recently hospitalized (7/30-8/2) with chest pain, atrial fibrillation, and congestive heart failure who underwent cardioversion after CHEYANNE and was started on Eliquis. Ibrutinib, which he was taking for CLL, was stopped as it can cause atrial fibrillation.      He followed up with hematology/oncology after hospitalization and ibrutinib remains on hold.  He followed up with cardiology after that admission and was scheduled to have a Lexiscan stress test next week to evaluate for ischemia as cause of AFIB.  Plan was to repeat echo in a month or so to see if ejection fraction recovers (was 40 to 45% on 8/2/2021 by CHEYANNE).       Austin presented to the emergency department for evaluation of mid upper back pain that radiated to his chest.  It was exacerbated by positioning and deep breaths.  There was no associated nausea, vomiting, diaphoresis, or shortness of breath.  The pain was bad enough that it made him sleep sitting up last night.  He went to the [facility] and sat in the sauna on the day of admission which may have  helped a little bit.  He also went to a chiropractor and that seemed to help for an hour but it came back.  Ultimately, he came to the emergency department for evaluation.       Back pain with radiation to chest of unclear cause  History of abdominal aortic aneurysm  Probably abnormal nuclear stress  -This is atypical for cardiac type chest pain  -I am more suspicious of a musculoskeletal pain, on the day of discharge pain had almost completely resolved  -CT aortic survey does not suggest aortic explanation for pain  -He underwent nuclear stress which showed a probably abnormal test.  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    -I spoke with cardiology by phone who reviewed the case with me.  His platelets are currently 40 which precludes him from being able to get a coronary angiogram at this time.  He is not having cardiac chest pain.  He has recommended that the patient undergo CT coronary angiogram in the outpatient setting followed by follow-up with cardiology in clinic.  I discussed this with the patient and his wife and they are agreeable to this.     Thrombocytopenia in setting of CLL and recent discontinuation of ibrutinib due to atrial fibrillation.  -Hold prior to admission Eliquis with severe thrombocytopenia. Discussed with Oncology who recommends holding Eliquis upon discharge.  -Repeat CBC in the morning     Recently diagnosed atrial fibrillation  -No current congestive heart failure  -Hold Eliquis with severe thrombocytopenia, as above he will hold this until he follows up with oncology in clinic next week  -Continue prior to admission metoprolol  -ECG shows sinus rhythm      Type 2 diabetes  Hyperglycemia  -Continue prior to admission Metformin     Discharge Disposition:  Discharged to home     Allergies:  Allergies   Allergen Reactions      Ace Inhibitors      cough        Condition on Discharge:  Discharge condition: Stable   Discharge vitals: Blood pressure 132/75, pulse 94, temperature 98.1  F (36.7  C), temperature source Oral, resp. rate 16, weight 94.8 kg (209 lb), SpO2 96 %.   Code status on discharge: Full Code     History of Illness:  See detailed admission note for full details.    Physical Exam:  Blood pressure 132/75, pulse 94, temperature 98.1  F (36.7  C), temperature source Oral, resp. rate 16, weight 94.8 kg (209 lb), SpO2 96 %.  Wt Readings from Last 1 Encounters:   08/24/21 94.8 kg (209 lb)     General: Alert, awake, no acute distress.  HEENT: Normocephalic, atraumatic, eyes anicteric and without scleral injection, EOMI, MMM.  Cardiac: RRR, normal S1, S2.  No m/g/r. No LE edema.  Pulmonary: Normal chest rise, normal work of breathing.  Lungs CTAB  Abdomen: soft, non-tender, non-distended.  Normoactive BS.  No guarding or rebound tenderness.  Extremities: no deformities.  Warm, well perfused.  Skin: no rashes or lesions noted.  Warm and Dry.  Neuro: No focal deficits noted.  Speech clear.  Coordination and strength grossly normal.  Psych: Appropriate affect. Alert and oriented x3    Procedures other than Imaging:  Nuclear stress   Phlebotomy     Imaging:  Results for orders placed or performed during the hospital encounter of 08/23/21   CT Aortic Survey w Contrast    Narrative    EXAM: CT AORTIC SURVEY W CONTRAST  LOCATION: [facility]  DATE/TIME: 8/23/2021 6:09 PM    INDICATION: Mid thoracic back pain with radiation to the chest, doesn't change with movement, known ectatic thoracic aorta, history AAA repair.  COMPARISON: CT of the abdomen and pelvis performed 10/15/2019.  TECHNIQUE: CT angiogram chest abdomen pelvis during arterial phase of injection of IV contrast. 2D and 3D MIP reconstructions were performed by the CT technologist. Dose reduction techniques were used.   CONTRAST: 79 mL Isovue-370.    FINDINGS:    Hypoechoic solid nodule in the left thyroid lobe is 1 cm. Inferior aspect of the right thyroid lobe is somewhat heterogeneous. May consider follow-up ultrasound exam. No abnormally enlarged mediastinal lymph nodes. Heart size is normal. No pericardial   effusion. Scattered mostly linear, though somewhat patchy opacities in both lung bases suggestive of atelectasis. Clinical correlation would be necessary for any possibility of pneumonia. Area of lucency with partial surrounding sclerosis noted extending   from the superior endplate of L2 vertebral body extending to the midportion of the vertebral body. This was visible on previous exam and relatively unchanged. This area measures up to 2.6 x 2.1 cm on today's exam, similar to previous exam again, with   surrounding sclerosis and central lucency. Exact etiology is uncertain.    Minimal cholelithiasis in the gallbladder lumen, without complicating features. The liver, spleen, adrenal glands, and pancreas are unchanged. Both kidneys are normally perfused. No hydronephrosis or nephrolithiasis. No intraperitoneal fluid or air.   Small fat-containing umbilical hernia. Fat-containing left inguinal hernia with a portion of sigmoid colon minimally extending into the proximal inguinal canal, though no dilated loops of bowel. Diverticula throughout the sigmoid colon. No dilated loops   of small bowel. Bladder is partially distended and unremarkable.    The ascending thoracic aorta measures up to 4.1 cm AP x 4 cm transverse. Origins of the great arteries are patent. The descending thoracic aorta demonstrates minimal atherosclerotic plaque, though no aneurysmal dilatation or stenosis. The celiac axis,   SMA, and bilateral renal arteries are patent. Aortobiiliac stent graft is visible. However, embolic material is noted throughout the abdominal aortic aneurysm, limiting visualization for size and for possibility of endoleak. Streak artifact limits   visualization, though the  distal limbs, internal iliac, external iliac, and common femoral arteries are clearly patent. Aneurysmal sac size is estimated to be 6.5 cm AP x 7.5 cm transverse, relatively unchanged, though again limitations exist. No visible   dissection in the native thoracoabdominal aorta. No central pulmonary embolus. No visible acute aortic pathology.      Impression    IMPRESSION:  1.  Patent thoracic aorta, though with ascending aorta measuring up to 4.1 cm AP x 4 cm transverse.  2.  Aortobiiliac stent graft, though with limited visualization due to embolic material within and adjacent to the aneurysmal sac of the abdominal aorta. Sac size is estimated to be 6.7 x 7.5 cm and relatively unchanged from previous exam, though these   measurements are limited as is assessment for endoleak.  3.  No visible dissection in the native thoracic aorta or otherwise no visible acute aortic pathology in the visible portions.  4.  Sigmoid diverticulosis with a small portion of the sigmoid colon at the internal inguinal canal on the left, perhaps slightly extending into the inguinal canal hernia, though no evidence of bowel obstruction.  5.  Scattered and mostly linear bibasilar pulmonary opacities most suggestive of atelectasis. Clinical correlation would be necessary for any possibility of infection.  6.  Irregular area in the right lateral aspect of L2 vertebral body with mixed area of sclerosis and lucency measuring approximately 2.6 x 2.1 cm. This may be a complex endplate defect, though uncertain if related to back pain. Appearance is relatively   unchanged from previous exam.  7.  Uncomplicated cholelithiasis.     NM MPI w Lexiscan     Value    Target     Baseline Systolic     Baseline Diastolic BP 72    Last Stress Systolic     Last Stress Diastolic BP 74    Baseline HR 75    Max HR 98    Calculated Percent HR 70    Rate Pressure Product 13,230.0    Left Ventricular EF 52    Narrative       The nuclear stress test  is probably abnormal.     There is nontransmural infarction in the apical and inferior segment(s)   of the left ventricle associated with a mild degree of arnoldo-infarct   ischemia.     Left ventricular function is low normal.     The left ventricular ejection fraction at stress is 52%.     There is no prior study for comparison.            Consultations:  Consultations This Hospital Stay   HEMATOLOGY & ONCOLOGY IP CONSULT  HEMATOLOGY & ONCOLOGY IP CONSULT     Recent Lab Results:  Recent Labs   Lab 08/24/21  0643 08/23/21  1737   WBC 5.9 6.5   HGB 11.0* 11.7*   HCT 33.8* 34.7*   MCV 96 97   PLT 44* 40*     Recent Labs   Lab 08/24/21  1340 08/24/21  0805 08/24/21  0643 08/23/21  1737   NA  --   --  135 137   POTASSIUM  --   --  3.9 4.0   CHLORIDE  --   --  106 107   CO2  --   --  23 26   ANIONGAP  --   --  6 4   * 216* 209* 252*   BUN  --   --  21 27   CR  --   --  1.41* 1.44*   GFRESTIMATED  --   --  47* 46*   MAGDALENO  --   --  8.6 8.9     Recent Labs   Lab 08/24/21  0643 08/23/21  1737   TROPONIN <0.015 <0.015          Pending Results:    Unresulted Labs Ordered in the Past 30 Days of this Admission     No orders found for last 31 day(s).           Discharge Instructions and Follow-Up:   Discharge Orders   No discharge procedures on file.  Discharge Medications   Current Discharge Medication List      CONTINUE these medications which have NOT CHANGED    Details   apixaban ANTICOAGULANT (ELIQUIS) 5 MG tablet Take 1 tablet (5 mg) by mouth 2 times daily  Qty: 60 tablet, Refills: 0    Associated Diagnoses: Atrial fibrillation, unspecified type (H)      metoprolol tartrate (LOPRESSOR) 50 MG tablet Take 1 tablet (50 mg) by mouth 2 times daily  Qty: 60 tablet, Refills: 0    Associated Diagnoses: Atrial fibrillation, unspecified type (H)      Multiple Vitamins-Minerals (CENTRUM SILVER) per tablet Take 1 tablet by mouth daily      !! ACCU-CHEK GUIDE test strip Use to test blood sugar 2 times daily or as directed.  Qty: 100  strip, Refills: 1    Associated Diagnoses: Type 2 diabetes mellitus with hypoglycemia without coma, without long-term current use of insulin (H)      blood glucose monitoring (NO BRAND SPECIFIED) meter device kit Use to test blood sugar 2 times daily or as directed.  Qty: 1 kit, Refills: 0    Comments: Contour meter  Associated Diagnoses: Type 2 diabetes mellitus with hypoglycemia without coma, without long-term current use of insulin (H)      !! CONTOUR NEXT TEST test strip USE TO TEST BLOOD SUGAR 1-2 TIMES DAILY OR AS DIRECTED (ADJUSTING ORAL MEDICATIONS).  Qty: 100 strip, Refills: 1    Associated Diagnoses: Type 2 diabetes mellitus without complication, without long-term current use of insulin (H)      desonide (DESOWEN) 0.05 % external ointment Apply topically 2 times daily To forehead x 2 weeks      ibrutinib (IMBRUVICA) 420 MG tablet Take 1 tablet (420 mg) by mouth daily Do not take until you hear from Dr. Perry's office.  Qty: 28 tablet, Refills: 0    Associated Diagnoses: CLL (chronic lymphocytic leukemia) (H)      metFORMIN (GLUCOPHAGE-XR) 500 MG 24 hr tablet Take 2 tablets (1,000 mg) by mouth every morning  Qty: 180 tablet, Refills: 1    Associated Diagnoses: Type 2 diabetes mellitus with hypoglycemia without coma, without long-term current use of insulin (H)      rosuvastatin (CRESTOR) 20 MG tablet Take 1 tablet (20 mg) by mouth At Bedtime  Qty: 30 tablet, Refills: 0    Associated Diagnoses: Atrial fibrillation, unspecified type (H)       !! - Potential duplicate medications found. Please discuss with provider.          Time Spent on this Encounter   I, Margie An MD, personally saw the patient today and spent greater than 30 minutes discharging this patient.    Margie An MD

## 2021-08-24 NOTE — H&P
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  He was hospitalized here 3 weeks ago from 7/30 through 8/2 with chest pain, atrial fibrillation, and congestive heart failure.  He was cardioverted after CHEYANNE.  He was started on Eliquis. Ibrutinib, which he was taking for CLL, was stopped as it can cause atrial fibrillation.  He followed up with hematology/oncology after hospitalization and ibrutinib remains on hold.  He followed up with cardiology after that admission and was scheduled to have a Lexiscan stress test next week to evaluate for ischemia as cause of AFIB.  Plan was to repeat echo in a month or so to see if ejection fraction recovers (was 40 to 45% on 8/2/2021 by CHEYANNE).      Austin presented to the emergency department this afternoon for evaluation of mid upper back pain that radiated to his chest.  This pain started yesterday.  It was exacerbated by positioning and deep breaths.  There was no associated nausea, vomiting, diaphoresis, or shortness of breath.  The pain was bad enough that it made him sleep sitting up last night.  He went to the [facility] and sat in the sauna today which may have helped a little bit.  He also went to a chiropractor earlier today and that seemed to help for an hour but it came back.  Ultimately, he came to the emergency department for evaluation.  Emergency department evaluation showed heart rate around 100 and elevated blood pressure but no fever and normal respiratory indices.  Basic metabolic panel  showed creatinine of 1.44 and blood glucose of 252 but was otherwise unremarkable.  Liver function tests were normal.  Lactic acid was normal at 0.9.  Troponin was undetectable.  CBC has not resulted for some reason but emergency department physician was contacted with platelet count of 40.  CT aorta study was obtained and showed patent thoracic aorta with ascending aorta measuring up to 4.1 cm x 4 cm.  His aortoiliac stent graft seemed unchanged from previous.  Assessment for endoleak was not possible.  No visible dissection in the native thoracic aorta was noted.  There were other incidental findings such as sigmoid diverticulosis with a small portion of sigmoid colon at the internal inguinal canal on the left perhaps extending into the inguinal canal hernia without evidence of obstruction.  There were scattered bibasilar pulmonary opacities suggestive of atelectasis.  Also noted was an irregular area in the right lateral aspect of L2 vertebral body with mixed area of sclerosis and lucency measuring approximately 2.6 x 2.1 cm.  It was thought this could be complex and plate defect but it was not clear that this is related to back pain.  This was unchanged from previous exam.  Uncomplicated gallstones were also noted.  Pain persisted in the emergency department.  Austin was uncomfortable discharging home.  I was asked to admit Austin to rule out atypical ischemia and obtain Lexiscan stress test.    Problem list:    Back pain with radiation to chest of unclear cause  History of abdominal aortic aneurysm  -This is atypical for cardiac type chest pain  -I am more suspicious of a musculoskeletal pain  -CT aortic survey does not suggest aortic explanation for pain  -Austin is due for ischemia evaluation because of his work recent atrial fibrillation  -Admit to observation; monitor on telemetry, repeat troponin in a.m, and obtain Lexiscan stress test to troponin remains undetectable  -Analgesics for back pain as  needed    Thrombocytopenia in setting of CLL and recent discontinuation of ibrutinib due to atrial fibrillation.  -We will ask [place]  hematology/oncology to see  -Hold prior to admission Eliquis with severe thrombocytopenia  -Repeat CBC in the morning    Recently diagnosed atrial fibrillation  -No current congestive heart failure  -Hold Eliquis with severe thrombocytopenia  -Continue prior to admission metoprolol  -ECG shows sinus rhythm      Type 2 diabetes  Hyperglycemia  -Continue prior to admission Metformin  -NovoLog sliding scale insulin as needed    COVID-19 PCR testing was negative    CODE STATUS: Full code  DVT prophylaxis: On Eliquis which is being held due to thrombocytopenia  Disposition: Admit to observation         Code Status:   Full Code         Primary Care Physician:   Vanessa Munson [phone]         Chief Complaint:   Mid upper back pain    History is obtained from Dr. 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  He was hospitalized here 3 weeks ago from 7/30 through 8/2 with chest pain, atrial fibrillation, and congestive heart failure.  He was cardioverted after CHEYANNE.  He was started on Eliquis. Ibrutinib, which he was taking for CLL, was stopped as it can cause atrial fibrillation.  He followed up with hematology/oncology after hospitalization and ibrutinib remains on hold.  He followed up with cardiology after that admission and was scheduled to have a Lexiscan stress test next week to evaluate for ischemia as cause of AFIB.  Plan was to repeat echo in a month or so to see if ejection fraction recovers (was 40 to 45% on 8/2/2021 by CHEYANNE).      Austin presented to the emergency  department this afternoon for evaluation of mid upper back pain that radiated to his chest.  This pain started yesterday.  It was exacerbated by positioning and deep breaths.  There was no associated nausea, vomiting, diaphoresis, or shortness of breath.  The pain was bad enough that it made him sleep sitting up last night.  He went to the [facility] and sat in the sauna today which may have helped a little bit.  He also went to a chiropractor earlier today and that seemed to help for an hour but it came back.  Ultimately, he came to the emergency department for evaluation.  Emergency department evaluation showed heart rate around 100 and elevated blood pressure but no fever and normal respiratory indices.  Basic metabolic panel showed creatinine of 1.44 and blood glucose of 252 but was otherwise unremarkable.  Liver function tests were normal.  Lactic acid was normal at 0.9.  Troponin was undetectable.  CBC has not resulted for some reason but emergency department physician was contacted with platelet count of 40.  CT aorta study was obtained and showed patent thoracic aorta with ascending aorta measuring up to 4.1 cm x 4 cm.  His aortoiliac stent graft seemed unchanged from previous.  Assessment for endoleak was not possible.  No visible dissection in the native thoracic aorta was noted.  There were other incidental findings such as sigmoid diverticulosis with a small portion of sigmoid colon at the internal inguinal canal on the left perhaps extending into the inguinal canal hernia without evidence of obstruction.  There were scattered bibasilar pulmonary opacities suggestive of atelectasis.  Also noted was an irregular area in the right lateral aspect of L2 vertebral body with mixed area of sclerosis and lucency measuring approximately 2.6 x 2.1 cm.  It was thought this could be complex and plate defect but it was not clear that this is related to back pain.  This was unchanged from previous exam.  Uncomplicated  gallstones were also noted.  Pain persisted in the emergency department.  Austin was uncomfortable discharging home.  I was asked to admit Austin to rule out atypical ischemia and obtain Lexiscan stress test.             Past Medical History:     Patient Active Problem List   Diagnosis     Hearing loss     Dysfunction of eustachian tube     Hypertension goal BP (blood pressure) < 140/90     Diminished Peripheral Pulse     Type 2 diabetes mellitus with hypoglycemia without coma, without long-term current use of insulin (H)     HYPERLIPIDEMIA LDL GOAL <100     Inflammatory Monoarthritis, Left Hip     Esophageal reflux     Overweight - BMI >35     BCC (basal cell carcinoma), face     Skin lesion of back     CLL (chronic lymphocytic leukemia) (H)     Abdominal aortic aneurysm (AAA) without rupture (H)     Anemia     Elevated prostate specific antigen (PSA)     CKD (chronic kidney disease) stage 3, GFR 30-59 ml/min     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)     Hypoglycemia     Bilateral incipient cataracts     Artery dissection (H)     Atrial fibrillation, unspecified type (H)     Chest pain, unspecified type     Acute midline thoracic back pain      Past Medical History:   Diagnosis Date     AAA (abdominal aortic aneurysm)      BCC (basal cell carcinoma of skin) - face      CLL (chronic lymphocytic leukemia)      Diaphragmatic hernia      Diverticulitis of colon      Esophageal reflux      Essential hypertension      Hyperlipidemia      Irritable bowel syndrome      Macular degeneration (senile) of retina      Squamous Cell Carcinoma, Back 1988     Type 2 diabetes mellitus              Past Surgical History:     Past Surgical History:   Procedure Laterality Date     ANESTHESIA CARDIOVERSION N/A 8/2/2021    Procedure: ANESTHESIA, FOR CARDIOVERSION;  Surgeon: GENERIC ANESTHESIA PROVIDER;  Location: [facility] OR     APPENDECTOMY OPEN  1966     BONE MARROW BIOPSY, BONE SPECIMEN, NEEDLE/TROCAR N/A 11/21/2017    Procedure: BIOPSY  BONE MARROW;  BONE MARROW BIOPSY;  Surgeon: Shady Garcia MD;  Location:  GI     COLONOSCOPY  2002     ENDOVASCULAR REPAIR ANEURYSM ABDOMINAL AORTA N/A 12/4/2017    Procedure: ENDOVASCULAR REPAIR ANEURYSM ABDOMINAL AORTA;  ENDOVASCULAR REPAIR ANEURYSM ABDOMINAL AORTA;  Surgeon: Milton Cazares MD;  Location:  OR     Fistulotomy with marsupialization for repair of fisture in ano  2007     IR ABDOMINAL AORTOGRAM  3/11/2019     IR VISCERAL EMBOLIZATION  5/13/2019     Multiple skin tags - resection  1998     Squamous cell skin cancer resection - back  1985-90            Home Medications:     Prior to Admission medications    Medication Sig Last Dose Taking? Auth Provider   ACCU-CHEK GUIDE test strip Use to test blood sugar 2 times daily or as directed.   Vanessa Munson MD   apixaban ANTICOAGULANT (ELIQUIS) 5 MG tablet Take 1 tablet (5 mg) by mouth 2 times daily   Yaya Mir MD   blood glucose monitoring (NO BRAND SPECIFIED) meter device kit Use to test blood sugar 2 times daily or as directed.   Vanessa Munson MD   CONTOUR NEXT TEST test strip USE TO TEST BLOOD SUGAR 1-2 TIMES DAILY OR AS DIRECTED (ADJUSTING ORAL MEDICATIONS).   Elizabeth Alaniz, APRN CNP   desonide (DESOWEN) 0.05 % external ointment Apply topically 2 times daily To forehead x 2 weeks  Patient not taking: Reported on 8/18/2021   Unknown, Entered By History   ibrutinib (IMBRUVICA) 420 MG tablet Take 1 tablet (420 mg) by mouth daily Do not take until you hear from Dr. Perry's office.  Patient not taking: Reported on 8/6/2021   Yaya Mir MD   metFORMIN (GLUCOPHAGE-XR) 500 MG 24 hr tablet Take 2 tablets (1,000 mg) by mouth every morning  Patient taking differently: Take 500 mg by mouth 2 times daily (with meals)    Vanessa Munson MD   metoprolol tartrate (LOPRESSOR) 50 MG tablet Take 1 tablet (50 mg) by mouth 2 times daily   Yaya Mir MD   Multiple Vitamins-Minerals  (CENTRUM SILVER) per tablet Take 1 tablet by mouth daily   Reported, Patient   rosuvastatin (CRESTOR) 20 MG tablet Take 1 tablet (20 mg) by mouth At Bedtime   Yaya Mir MD            Allergies:     Allergies   Allergen Reactions     Ace Inhibitors      cough            Social History:     Social History     Tobacco Use     Smoking status: Former Smoker     Packs/day: 0.50     Years: 20.00     Pack years: 10.00     Quit date: 1975     Years since quittin.6     Smokeless tobacco: Former User   Substance Use Topics     Alcohol use: Yes     Alcohol/week: 10.0 - 14.0 standard drinks     Types: 10 - 14 Standard drinks or equivalent per week     Comment: 3 drinks a day/wine             Family History:     Family History   Problem Relation Age of Onset     Cerebrovascular Disease Father         x 2     Hypertension Mother      C.A.D. Mother      Cancer Mother         skin     Eye Disorder Mother         glaucoma     Prostate Cancer No family hx of      Cancer - colorectal No family hx of      Glaucoma No family hx of      Macular Degeneration No family hx of               Review of Systems:   The 10 point Review of Systems is negative other than as noted in the HPI.           Physical Exam:   Blood pressure (!) 161/86, pulse 113, temperature 98.5  F (36.9  C), temperature source Oral, resp. rate 20, weight 95 kg (209 lb 6.4 oz), SpO2 95 %.  209 lbs 6.4 oz      GENERAL: Pleasant and cooperative. No acute distress.  EYES: Pupils equal and round. No scleral erythema or icterus.  ENT: External ears are normal without deformity. Posterior oropharynx is without erythem, swelling, or exudate.  NECK: Supple. No masses or swelling. No tenderness. Thyroid is normal without mass or tenderness.  CHEST: Clear to auscultation. Normal breath sounds. No retractions.   CV: Regular rate and rhythm. No JVD. Pulses normal.  ABDOMEN: Bowel sounds present. No tenderness. No masses or hernia.  EXTREMETIES: No clubbing,  cyanosis, or ischemia.  SKIN: Warm and dry to touch. No wounds or rashes.  NEUROLOGIC: Strength and sensation are normal. Deep tendon reflexes are normal. Cranial nerves are normal.             Data:   All new lab and imaging data was reviewed.     Results for orders placed or performed during the hospital encounter of 08/23/21 (from the past 24 hour(s))   EKG 12 lead   Result Value Ref Range    Systolic Blood Pressure  mmHg    Diastolic Blood Pressure  mmHg    Ventricular Rate 98 BPM    Atrial Rate 98 BPM    ND Interval 206 ms    QRS Duration 74 ms     ms    QTc 428 ms    P Axis 48 degrees    R AXIS -8 degrees    T Axis 17 degrees    Interpretation ECG       Sinus rhythm  Minimal voltage criteria for LVH, may be normal variant  Inferior infarct (cited on or before 23-AUG-2020)  Abnormal ECG  When compared with ECG of 02-AUG-2021 09:14,  No significant change was found     CBC + differential    Narrative    The following orders were created for panel order CBC + differential.  Procedure                               Abnormality         Status                     ---------                               -----------         ------                     CBC with platelets and d...[[id number]]                      In process                 RBC and Platelet Morphology[[id number]]                      Final result                 Please view results for these tests on the individual orders.   Comprehensive metabolic panel   Result Value Ref Range    Sodium 137 133 - 144 mmol/L    Potassium 4.0 3.4 - 5.3 mmol/L    Chloride 107 94 - 109 mmol/L    Carbon Dioxide (CO2) 26 20 - 32 mmol/L    Anion Gap 4 3 - 14 mmol/L    Urea Nitrogen 27 7 - 30 mg/dL    Creatinine 1.44 (H) 0.66 - 1.25 mg/dL    Calcium 8.9 8.5 - 10.1 mg/dL    Glucose 252 (H) 70 - 99 mg/dL    Alkaline Phosphatase 69 40 - 150 U/L    AST 13 0 - 45 U/L    ALT 33 0 - 70 U/L    Protein Total 6.9 6.8 - 8.8 g/dL    Albumin 3.7 3.4 - 5.0 g/dL    Bilirubin Total 0.9 0.2  - 1.3 mg/dL    GFR Estimate 46 (L) >60 mL/min/1.73m2   Troponin I (now)   Result Value Ref Range    Troponin I <0.015 0.000 - 0.045 ug/L   Lactic acid whole blood   Result Value Ref Range    Lactic Acid 0.9 0.7 - 2.0 mmol/L   RBC and Platelet Morphology   Result Value Ref Range    Platelet Assessment  Automated Count Confirmed. Platelet morphology is normal.     Automated Count Confirmed. Platelet morphology is normal.    RBC Morphology Confirmed RBC Indices    CT Aortic Survey w Contrast    Narrative    EXAM: CT AORTIC SURVEY W CONTRAST  LOCATION: [facility]  DATE/TIME: 8/23/2021 6:09 PM    INDICATION: Mid thoracic back pain with radiation to the chest, doesn't change with movement, known ectatic thoracic aorta, history AAA repair.  COMPARISON: CT of the abdomen and pelvis performed 10/15/2019.  TECHNIQUE: CT angiogram chest abdomen pelvis during arterial phase of injection of IV contrast. 2D and 3D MIP reconstructions were performed by the CT technologist. Dose reduction techniques were used.   CONTRAST: 79 mL Isovue-370.    FINDINGS:   Hypoechoic solid nodule in the left thyroid lobe is 1 cm. Inferior aspect of the right thyroid lobe is somewhat heterogeneous. May consider follow-up ultrasound exam. No abnormally enlarged mediastinal lymph nodes. Heart size is normal. No pericardial   effusion. Scattered mostly linear, though somewhat patchy opacities in both lung bases suggestive of atelectasis. Clinical correlation would be necessary for any possibility of pneumonia. Area of lucency with partial surrounding sclerosis noted extending   from the superior endplate of L2 vertebral body extending to the midportion of the vertebral body. This was visible on previous exam and relatively unchanged. This area measures up to 2.6 x 2.1 cm on today's exam, similar to previous exam again, with   surrounding sclerosis and central lucency. Exact etiology is uncertain.    Minimal cholelithiasis in the  gallbladder lumen, without complicating features. The liver, spleen, adrenal glands, and pancreas are unchanged. Both kidneys are normally perfused. No hydronephrosis or nephrolithiasis. No intraperitoneal fluid or air.   Small fat-containing umbilical hernia. Fat-containing left inguinal hernia with a portion of sigmoid colon minimally extending into the proximal inguinal canal, though no dilated loops of bowel. Diverticula throughout the sigmoid colon. No dilated loops   of small bowel. Bladder is partially distended and unremarkable.    The ascending thoracic aorta measures up to 4.1 cm AP x 4 cm transverse. Origins of the great arteries are patent. The descending thoracic aorta demonstrates minimal atherosclerotic plaque, though no aneurysmal dilatation or stenosis. The celiac axis,   SMA, and bilateral renal arteries are patent. Aortobiiliac stent graft is visible. However, embolic material is noted throughout the abdominal aortic aneurysm, limiting visualization for size and for possibility of endoleak. Streak artifact limits   visualization, though the distal limbs, internal iliac, external iliac, and common femoral arteries are clearly patent. Aneurysmal sac size is estimated to be 6.5 cm AP x 7.5 cm transverse, relatively unchanged, though again limitations exist. No visible   dissection in the native thoracoabdominal aorta. No central pulmonary embolus. No visible acute aortic pathology.      Impression    IMPRESSION:  1.  Patent thoracic aorta, though with ascending aorta measuring up to 4.1 cm AP x 4 cm transverse.  2.  Aortobiiliac stent graft, though with limited visualization due to embolic material within and adjacent to the aneurysmal sac of the abdominal aorta. Sac size is estimated to be 6.7 x 7.5 cm and relatively unchanged from previous exam, though these   measurements are limited as is assessment for endoleak.  3.  No visible dissection in the native thoracic aorta or otherwise no visible  acute aortic pathology in the visible portions.  4.  Sigmoid diverticulosis with a small portion of the sigmoid colon at the internal inguinal canal on the left, perhaps slightly extending into the inguinal canal hernia, though no evidence of bowel obstruction.  5.  Scattered and mostly linear bibasilar pulmonary opacities most suggestive of atelectasis. Clinical correlation would be necessary for any possibility of infection.  6.  Irregular area in the right lateral aspect of L2 vertebral body with mixed area of sclerosis and lucency measuring approximately 2.6 x 2.1 cm. This may be a complex endplate defect, though uncertain if related to back pain. Appearance is relatively   unchanged from previous exam.  7.  Uncomplicated cholelithiasis.     Asymptomatic COVID-19 Virus (Coronavirus) by PCR Nasopharyngeal    Specimen: Nasopharyngeal; Swab   Result Value Ref Range    SARS CoV2 PCR Negative Negative    Narrative    Testing was performed using the moose  SARS-CoV-2 & Influenza A/B Assay on the moose  [employer]  System.  This test should be ordered for the detection of SARS-COV-2 in individuals who meet SARS-CoV-2 clinical and/or epidemiological criteria. Test performance is unknown in asymptomatic patients.  This test is for in vitro diagnostic use under the FDA EUA for laboratories certified under CLIA to perform moderate and/or high complexity testing. This test has not been FDA cleared or approved.  A negative test does not rule out the presence of PCR inhibitors in the specimen or target RNA in concentration below the limit of detection for the assay. The possibility of a false negative should be considered if the patient's recent exposure or clinical presentation suggests COVID-19.  [facility] are certified under the Clinical Laboratory Improvement Amendments of 1988 (CLIA-88) as qualified to perform moderate and/or high complexity laboratory testing.   Glucose by meter   Result Value Ref Range     GLUCOSE BY METER POCT 258 (H) 70 - 99 mg/dL

## 2021-08-24 NOTE — ED NOTES
[facility]  ED Nurse Handoff Report    Austin Garza is a 81 year old male   ED Chief complaint: Back Pain  . ED Diagnosis:   Final diagnoses:   Acute midline thoracic back pain     Allergies:   Allergies   Allergen Reactions     Ace Inhibitors      cough       Code Status: Full Code  Activity level - Baseline/Home:  Independent. Activity Level - Current:   Stand by Assist. Lift room needed: No. Bariatric: No   Needed: No   Isolation: No. Infection: Not Applicable  COVID r/o and special precautions.     Vital Signs:   Vitals:    08/23/21 1715 08/23/21 1730 08/23/21 1745 08/23/21 1800   BP: (!) 178/97 (!) 172/104 (!) 181/106 (!) 183/103   Pulse: 103 103 104 104   Resp:       Temp:       TempSrc:       SpO2: 97% 95% 96% 96%       Cardiac Rhythm:  ,      Pain level:    Patient confused: No. Patient Falls Risk: Yes.   Elimination Status: Has voided   Patient Report - Initial Complaint: Back pain. Focused Assessment: Back pain, worse when laying flat   Tests Performed:   Labs Ordered and Resulted from Time of ED Arrival Up to the Time of Departure from the ED   COMPREHENSIVE METABOLIC PANEL - Abnormal; Notable for the following components:       Result Value    Creatinine 1.44 (*)     Glucose 252 (*)     GFR Estimate 46 (*)     All other components within normal limits   TROPONIN I - Normal   LACTIC ACID WHOLE BLOOD - Normal   RBC AND PLATELET MORPHOLOGY   CBC WITH PLATELETS & DIFFERENTIAL    Narrative:     The following orders were created for panel order CBC + differential.  Procedure                               Abnormality         Status                     ---------                               -----------         ------                     CBC with platelets and d...[[id number]]                      In process                 RBC and Platelet Morphology[[id number]]                      Final result                 Please view results for these tests on the individual orders.   CBC WITH  PLATELETS AND DIFFERENTIAL   PERIPHERAL IV CATHETER     CT Aortic Survey w Contrast   Final Result   IMPRESSION:   1.  Patent thoracic aorta, though with ascending aorta measuring up to 4.1 cm AP x 4 cm transverse.   2.  Aortobiiliac stent graft, though with limited visualization due to embolic material within and adjacent to the aneurysmal sac of the abdominal aorta. Sac size is estimated to be 6.7 x 7.5 cm and relatively unchanged from previous exam, though these    measurements are limited as is assessment for endoleak.   3.  No visible dissection in the native thoracic aorta or otherwise no visible acute aortic pathology in the visible portions.   4.  Sigmoid diverticulosis with a small portion of the sigmoid colon at the internal inguinal canal on the left, perhaps slightly extending into the inguinal canal hernia, though no evidence of bowel obstruction.   5.  Scattered and mostly linear bibasilar pulmonary opacities most suggestive of atelectasis. Clinical correlation would be necessary for any possibility of infection.   6.  Irregular area in the right lateral aspect of L2 vertebral body with mixed area of sclerosis and lucency measuring approximately 2.6 x 2.1 cm. This may be a complex endplate defect, though uncertain if related to back pain. Appearance is relatively    unchanged from previous exam.   7.  Uncomplicated cholelithiasis.           . Abnormal Results: See above.   Treatments provided: Diagnostics, fentanyl  Family Comments: Wife at bedside  OBS brochure/video discussed/provided to patient:  Yes  ED Medications:   Medications   CT scan flush (57 mLs As instructed Given 8/23/21 1812)   iopamidol (ISOVUE-370) solution 500 mL (79 mLs Intravenous Given 8/23/21 1812)   fentaNYL (PF) (SUBLIMAZE) injection 50 mcg (50 mcg Intravenous Given 8/23/21 1917)     Drips infusing:  No  For the majority of the shift, the patient's behavior Green. Interventions performed were N/A.    Sepsis treatment initiated:  No     Patient tested for COVID 19 prior to admission: YES    ED Nurse Name/Phone Number: Farhad Chawla RN,   9:55 PM    RECEIVING UNIT ED HANDOFF REVIEW    Above ED Nurse Handoff Report was reviewed: Yes  Reviewed by: Axel Thompson RN on August 23, 2021 at 10:17 PM

## 2021-08-24 NOTE — PLAN OF CARE
PRIMARY DIAGNOSIS: BACK PAIN  OUTPATIENT/OBSERVATION GOALS TO BE MET BEFORE DISCHARGE:  1. Pain Status: Improved-controlled with oral pain medications.    2. Return to near baseline physical activity: Yes    3. Cleared for discharge by consultants (if involved): No    Discharge Planner Nurse   Safe discharge environment identified: Yes  Barriers to discharge: Yes       Entered by: Constance Rodriguez 08/24/2021 .  Please review provider order for any additional goals.   Nurse to notify provider when observation goals have been met and patient is ready for discharge.    AOx4. Up SBA. Rates pain in back-between shoulder blades 2/10, it sometimes radiates around rib cage with deep breath. Declines interventions at this time. SR on tele. Plan for baudilio stress test today, and hem/onc consult for anticoagulant management given thrombocytopenia. Will continue to monitor and provide supportive cares. /75 (BP Location: Left arm)   Pulse 94   Temp 98.1  F (36.7  C) (Oral)   Resp 16   Wt 94.8 kg (209 lb)   SpO2 96%   BMI 30.86 kg/m

## 2021-08-24 NOTE — PLAN OF CARE
Patient's After Visit Summary was reviewed with patient and/or spouse.   Patient verbalized understanding of After Visit Summary, recommended follow up and was given an opportunity to ask questions.   Discharge medications sent home with patient/family: No   Discharged with spouse    OBSERVATION patient END time:[id number]

## 2021-08-25 ENCOUNTER — PATIENT OUTREACH (OUTPATIENT)
Dept: CARE COORDINATION | Facility: CLINIC | Age: 81
End: 2021-08-25

## 2021-08-25 DIAGNOSIS — Z71.89 OTHER SPECIFIED COUNSELING: ICD-10-CM

## 2021-08-25 LAB
PATH REPORT.COMMENTS IMP SPEC: NORMAL
PATH REPORT.COMMENTS IMP SPEC: NORMAL
PATH REPORT.FINAL DX SPEC: NORMAL
PATH REPORT.MICROSCOPIC SPEC OTHER STN: NORMAL
PATH REPORT.MICROSCOPIC SPEC OTHER STN: NORMAL
PATH REPORT.RELEVANT HX SPEC: NORMAL

## 2021-08-25 PROCEDURE — 85060 BLOOD SMEAR INTERPRETATION: CPT | Performed by: PATHOLOGY

## 2021-08-25 NOTE — PROGRESS NOTES
"Clinic Care Coordination Contact  [facility]: Post-Discharge Note  SITUATION                                                      Admission:    Admission Date: 08/23/21   Reason for Admission: Upper back pain  Discharge:   Discharge Date: 08/24/21  Discharge Diagnosis: Thrombocytopenia    BACKGROUND                                                      Austin Sherman is a 81-year-old male with history of CLL, atrial fibrillation, abdominal aortic aneurysm, DM2, hypertension, hypercholesterolemia, GERD who was recently hospitalized (7/30-8/2) with chest pain, atrial fibrillation, and congestive heart failure who underwent cardioversion after CHEYANNE and was started on Eliquis. Ibrutinib, which he was taking for CLL, was stopped as it can cause atrial fibrillation.      ASSESSMENT      Enrollment  Primary Care Care Coordination Status: Declined    Discharge Assessment  How are you doing now that you are home?: \"I'm feeling pretty good\"  How are your symptoms? (Red Flag symptoms escalate to triage hotline per guidelines): Unchanged  Do you feel your condition is stable enough to be safe at home until your provider visit?: Yes  Does the patient have their discharge instructions? : Yes  Does the patient have questions regarding their discharge instructions? : No  Were you started on any new medications or were there changes to any of your previous medications? : Yes  Does the patient have all of their medications?: Yes  Do you have questions regarding any of your medications? : No  Do you have all of your needed medical supplies or equipment (DME)?  (i.e. oxygen tank, CPAP, cane, etc.): Yes  Discharge follow-up appointment scheduled within 14 calendar days? : Yes  Discharge Follow Up Appointment Date: 09/02/21  Discharge Follow Up Appointment Scheduled with?: Primary Care Provider    Post-op ([facility] Only)  If the patient had a surgery or procedure, do they have any questions for a nurse?: No    Post-op (Clinicians " Only)  Did the patient have surgery or a procedure: No  Fever: No  Chills: Yes (Last night he was chilly)  Eating & Drinking: eating and drinking without complaints/concerns  PO Intake: regular diet  Bowel Function: normal  Date of last BM: 08/25/21  Urinary Status: voiding without complaint/concerns        PLAN                                                      Outpatient Plan:  Follow up with Oncology in clinic in next week as already scheduled    Future Appointments   Date Time Provider Department Center   9/2/2021  9:30 AM  MA LAB [facility]   9/2/2021  9:45 AM Breana Perry MD [facility]   9/8/2021  8:30 AM [id number]   9/13/2021  1:15 PM Raheel Schaffer MD [facility]   9/15/2021  3:00 PM Wenceslao Teran MD [facility]         For any urgent concerns, please contact our 24 hour nurse triage line: [phone] ([phone])         Linda East  Community Health Worker  Connected Care Resource [facility]  [phone]

## 2021-08-29 ENCOUNTER — TELEPHONE (OUTPATIENT)
Dept: PEDIATRICS | Facility: CLINIC | Age: 81
End: 2021-08-29

## 2021-08-29 NOTE — TELEPHONE ENCOUNTER
Reason for Call:  Other appointment    Detailed comments: Pt needs same day appt (08/30/21) for upper back pain     Phone Number Patient can be reached at: Home number on file [phone] (home)    Best Time: Mornings     Can we leave a detailed message on this number? YES    Call taken on 8/29/2021 at 11:37 AM by Aurea Sierra

## 2021-08-30 ENCOUNTER — TRANSFERRED RECORDS (OUTPATIENT)
Dept: HEALTH INFORMATION MANAGEMENT | Facility: CLINIC | Age: 81
End: 2021-08-30

## 2021-08-31 ENCOUNTER — OFFICE VISIT (OUTPATIENT)
Dept: FAMILY MEDICINE | Facility: CLINIC | Age: 81
End: 2021-08-31
Payer: COMMERCIAL

## 2021-08-31 ENCOUNTER — MYC MEDICAL ADVICE (OUTPATIENT)
Dept: PEDIATRICS | Facility: CLINIC | Age: 81
End: 2021-08-31

## 2021-08-31 VITALS
OXYGEN SATURATION: 98 % | HEART RATE: 98 BPM | RESPIRATION RATE: 20 BRPM | WEIGHT: 210.2 LBS | TEMPERATURE: 98.6 F | SYSTOLIC BLOOD PRESSURE: 140 MMHG | HEIGHT: 69 IN | DIASTOLIC BLOOD PRESSURE: 80 MMHG | BODY MASS INDEX: 31.13 KG/M2

## 2021-08-31 DIAGNOSIS — M62.838 MUSCLE SPASM: Primary | ICD-10-CM

## 2021-08-31 PROCEDURE — 99213 OFFICE O/P EST LOW 20 MIN: CPT | Performed by: NURSE PRACTITIONER

## 2021-08-31 ASSESSMENT — MIFFLIN-ST. JEOR: SCORE: 1658.84

## 2021-08-31 ASSESSMENT — PAIN SCALES - GENERAL: PAINLEVEL: NO PAIN (0)

## 2021-08-31 NOTE — PROGRESS NOTES
Assessment & Plan     Muscle spasm  Continue with tylenol and heat; avoid NSAIDs due to thrombocytopenia.  Will have see PT.  Follow-up with PCP in 1 month; sooner if worsening or associated with new symptoms.   - [facility] Referral; Future             Return in about 4 weeks (around 9/28/2021) for back pain with PCP , In-Clinic Visit.    GISELL Echeverria CNP  [facility] ASHLEY Avila is a 81 year old who presents for the following health issues     HPI       Hospital Follow-up Visit:    Hospital/Nursing Home/IP Rehab Facility: [facility]  Date of Admission: 08/23/21  Date of Discharge: 08/24/21  Reason(s) for Admission: Upper Back pain.  He is not able to sleep and is still having upper back pain. The back pain was not addressed at the ED visit. His pain is radiating to his neck this morning and into his left ear.      Was your hospitalization related to COVID-19? No   Problems taking medications regularly:  No  Medication changes since discharge: none  Problems adhering to non-medication therapy:  None    Summary of hospitalization:  [facility] discharge summary reviewed  Diagnostic Tests/Treatments reviewed.  Follow up needed: specialist appts  Other Healthcare Providers Involved in Patient s Care:         Specialist appointment - oncology, cardiology   Update since discharge: improved.       Post Discharge Medication Reconciliation: discharge medications reconciled, continue medications without change.  Plan of care communicated with patient            He has been having upper back pain for a few weeks.  He denies ever having chest pain though his ER notes indicate he was initially c/o pain in his chest.  The back pain is worse at night and better during the day.  Pain feels like a muscle pull and started as more generalized pain in the upper back.  Now he can put one finger on the location of the pain and mentions that this  "morning he felt a shock like pain radiate up towards the back of the neck.  At night he can't find a position to lay down that is comfortable.  He has found laying in the recliner at night to be the most comfortable.  Denies breathing concerns.  Certain movements with the L arm trigger pain in the back, most notably using the L arm to push off when standing.  He is taking tylenol 3x during the night and none during the day.  He does find relief with tylenol.  He saw his chiro once and after the adjustment pain was temporarily resolved for 2 hours.      Review of Systems   Constitutional, HEENT, cardiovascular, pulmonary, gi and gu, MS, skin and neuro systems are negative, except as otherwise noted.      Objective    BP (!) 140/80 (BP Location: Right arm, Patient Position: Sitting, Cuff Size: Adult Large)   Pulse 98   Temp 98.6  F (37  C) (Oral)   Resp 20   Ht 1.753 m (5' 9\")   Wt 95.3 kg (210 lb 3.2 oz)   SpO2 98%   BMI 31.04 kg/m    Body mass index is 31.04 kg/m .  Physical Exam  Musculoskeletal:        Back:       Comments: Focal tenderness, muscle tightness, no midline tenderness, FROM and normal strength of bilat upper arms        GENERAL: healthy, alert and no distress  NECK: no adenopathy, no asymmetry, masses, or scars and thyroid normal to palpation  RESP: lungs clear to auscultation - no rales, rhonchi or wheezes  CV: regular rate and rhythm, normal S1 S2, no S3 or S4, no murmur, click or rub, no peripheral edema and peripheral pulses strong  MS: no gross musculoskeletal defects noted, no edema  SKIN: no rashes or bruising     No results found for this or any previous visit (from the past 24 hour(s)).            "

## 2021-08-31 NOTE — TELEPHONE ENCOUNTER
MA/HARMAN-  Please assist pt in scheduling virtual appt with Dr. Eastman about blood glucose readings. Thank you! Nicole Lion RN on 8/31/2021 at 2:13 PM'

## 2021-09-01 ENCOUNTER — THERAPY VISIT (OUTPATIENT)
Dept: PHYSICAL THERAPY | Facility: CLINIC | Age: 81
End: 2021-09-01
Payer: COMMERCIAL

## 2021-09-01 DIAGNOSIS — M54.6 ACUTE MIDLINE THORACIC BACK PAIN: Primary | ICD-10-CM

## 2021-09-01 DIAGNOSIS — M54.2 CERVICAL PAIN: ICD-10-CM

## 2021-09-01 PROCEDURE — 97110 THERAPEUTIC EXERCISES: CPT | Mod: GP | Performed by: PHYSICAL THERAPIST

## 2021-09-01 PROCEDURE — 97162 PT EVAL MOD COMPLEX 30 MIN: CPT | Mod: GP | Performed by: PHYSICAL THERAPIST

## 2021-09-01 NOTE — PROGRESS NOTES
"Physical Therapy Initial Evaluation  Subjective:  The history is provided by the patient. No  was used.   Patient Health History  Austin Garza being seen for neck/upper back.     Date of Onset: July 2021.   Problem occurred: unknown   Pain is reported as 1/10 (can increase to 7/10) on pain scale.  General health as reported by patient is good.  Pertinent medical history includes: anemia, cancer, diabetes, heart problems, high blood pressure, overweight, numbness/tingling, osteoarthritis and kidney disease.   Red flags:  Pain at rest/night and chest pain.  Medical allergies: other. Other medical allergies details: ace inhibitors.   Surgeries include:  Cancer surgery and heart surgery.    Current medications:  Heparin/coumadin, cardiac medication and other. Other medications details: cancer meds.    Current occupation is retired.                     Therapist Generated HPI Evaluation  Problem details: Patient complains of insidious onset L sided upper back/lower cervical pain that radiates up his neck, to his scapula and occasionally his chest. Pain increases at night, has trouble falling asleep d/t pain. Typically is a side sleeper; however, is now sleeping reclined on his couch or recliner due to pain. Deep breathing increases the pain. Has been seen in ER x 2, per patient report they ruled out cardiac causes, suspect musculoskeletal origin. Laying supine also increases the pain. Chiropractic helped with the pain, but only for 2 hours..         Type of problem:  Cervical spine.    This is a new condition.  Condition occurred with:  Insidious onset.  Where condition occurred: for unknown reasons.  Patient reports pain:  Upper thoracic, lower cervical spine, cervical left side, mid cervical spine and upper cervical spine.  Pain is described as other and shooting (\"shockwaves\") and is intermittent.  Pain radiates to:  Shoulder left, other and head (ribs and chest). Pain is worse during the " night.  Since onset symptoms are gradually improving.  Associated with: none. Symptoms are exacerbated by other, lifting, carrying and rotating head (laying supine or sidelying)  and relieved by NSAID's and analgesics.    Previous treatment includes chiropractic. There was mild improvement following previous treatment.  Barriers include:  None as reported by patient.                        Objective:  Standing Alignment:    Cervical/Thoracic:  Forward head and thoracic kyphosis increased  Shoulder/UE:  Rounded shoulders  Lumbar:  Lordosis decr                Flexibility/Screens:     Upper Extremity:    Decreased left upper extremity flexibility at:  Pectoralis Major and Pectoralis Minor    Decreased right upper extremity flexibility present at:  Pectoralis Major and Pectoralis Minor    Spine:  Decreased left spine flexibility:  Upper Trap and Levator    Decreased right spine flexibility:  Upper Trap and Levator                  Cervical/Thoracic Evaluation  Arom wnl cervical: Repeated Cervical Retraction: improves ROM.     AROM:  AROM Cervical:    Flexion:          100%  Extension:       50%  Rotation:         Left: 60% painful     Right: 50% painful  Side Bend:      Left:     Right:   AROM Thoracic:    Flexion:               75% painful  Extension:          10%  Rotation:            Left: 75% painful     Right: 75%      Headaches: none  Cervical Myotomes:  normal                        Cervical Palpation:    Tenderness present at Left:    Rhomboids; Upper Trap and Levator                                                    General     ROS    Assessment/Plan:    Patient is a 81 year old male with cervical and thoracic complaints.    Patient has the following significant findings with corresponding treatment plan.                Diagnosis 1:  Neck and upper back pain  Pain -  hot/cold therapy, manual therapy, education, directional preference exercise and home program  Decreased ROM/flexibility - manual therapy and  therapeutic exercise  Impaired muscle performance - neuro re-education  Decreased function - therapeutic activities  Impaired posture - neuro re-education    Therapy Evaluation Codes:   1) History comprised of:   Personal factors that impact the plan of care:      Age.    Comorbidity factors that impact the plan of care are:      Cancer, Chest pain, Diabetes, Heart problems, High blood pressure, Numbness/tingling, Osteoarthritis, Overweight and Pain at night/rest.     Medications impacting care: Cardiac, Heparin/coumadin and cancer meds.  2) Examination of Body Systems comprised of:   Body structures and functions that impact the plan of care:      Cervical spine and Thoracic Spine.   Activity limitations that impact the plan of care are:      Bending, Driving, Lifting, Sleeping and Laying down.  3) Clinical presentation characteristics are:   Evolving/Changing.  4) Decision-Making    Moderate complexity using standardized patient assessment instrument and/or measureable assessment of functional outcome.  Cumulative Therapy Evaluation is: Moderate complexity.    Previous and current functional limitations:  (See Goal Flow Sheet for this information)    Short term and Long term goals: (See Goal Flow Sheet for this information)     Communication ability:  Patient appears to be able to clearly communicate and understand verbal and written communication and follow directions correctly.  Treatment Explanation - The following has been discussed with the patient:   RX ordered/plan of care  Anticipated outcomes  Possible risks and side effects  This patient would benefit from PT intervention to resume normal activities.   Rehab potential is good.    Frequency:  1 X week, once daily  Duration:  for 8 weeks  Discharge Plan:  Achieve all LTG.  Independent in home treatment program.  Reach maximal therapeutic benefit.    Please refer to the daily flowsheet for treatment today, total treatment time and time spent performing 1:1  timed codes.

## 2021-09-02 ENCOUNTER — LAB (OUTPATIENT)
Dept: ONCOLOGY | Facility: CLINIC | Age: 81
End: 2021-09-02
Attending: INTERNAL MEDICINE
Payer: COMMERCIAL

## 2021-09-02 VITALS
BODY MASS INDEX: 31.25 KG/M2 | SYSTOLIC BLOOD PRESSURE: 160 MMHG | RESPIRATION RATE: 16 BRPM | OXYGEN SATURATION: 98 % | WEIGHT: 211 LBS | DIASTOLIC BLOOD PRESSURE: 86 MMHG | TEMPERATURE: 97 F | HEIGHT: 69 IN | HEART RATE: 96 BPM

## 2021-09-02 DIAGNOSIS — C91.10 CLL (CHRONIC LYMPHOCYTIC LEUKEMIA) (H): Primary | ICD-10-CM

## 2021-09-02 DIAGNOSIS — C91.10 CLL (CHRONIC LYMPHOCYTIC LEUKEMIA) (H): ICD-10-CM

## 2021-09-02 LAB
ALBUMIN SERPL-MCNC: 3.2 G/DL (ref 3.4–5)
ALP SERPL-CCNC: 186 U/L (ref 40–150)
ALT SERPL W P-5'-P-CCNC: 136 U/L (ref 0–70)
ANION GAP SERPL CALCULATED.3IONS-SCNC: 4 MMOL/L (ref 3–14)
AST SERPL W P-5'-P-CCNC: 51 U/L (ref 0–45)
BASOPHILS # BLD AUTO: 0 10E3/UL (ref 0–0.2)
BASOPHILS NFR BLD AUTO: 0 %
BILIRUB SERPL-MCNC: 0.7 MG/DL (ref 0.2–1.3)
BUN SERPL-MCNC: 27 MG/DL (ref 7–30)
CALCIUM SERPL-MCNC: 8.6 MG/DL (ref 8.5–10.1)
CHLORIDE BLD-SCNC: 101 MMOL/L (ref 94–109)
CO2 SERPL-SCNC: 27 MMOL/L (ref 20–32)
CREAT SERPL-MCNC: 1.46 MG/DL (ref 0.66–1.25)
EOSINOPHIL # BLD AUTO: 0.1 10E3/UL (ref 0–0.7)
EOSINOPHIL NFR BLD AUTO: 1 %
ERYTHROCYTE [DISTWIDTH] IN BLOOD BY AUTOMATED COUNT: 14.1 % (ref 10–15)
GFR SERPL CREATININE-BSD FRML MDRD: 45 ML/MIN/1.73M2
GLUCOSE BLD-MCNC: 463 MG/DL (ref 70–99)
HCT VFR BLD AUTO: 33.5 % (ref 40–53)
HGB BLD-MCNC: 10.9 G/DL (ref 13.3–17.7)
IMM GRANULOCYTES # BLD: 0 10E3/UL
IMM GRANULOCYTES NFR BLD: 0 %
LDH SERPL L TO P-CCNC: 262 U/L (ref 85–227)
LYMPHOCYTES # BLD AUTO: 1.6 10E3/UL (ref 0.8–5.3)
LYMPHOCYTES NFR BLD AUTO: 29 %
MCH RBC QN AUTO: 31.8 PG (ref 26.5–33)
MCHC RBC AUTO-ENTMCNC: 32.5 G/DL (ref 31.5–36.5)
MCV RBC AUTO: 98 FL (ref 78–100)
MONOCYTES # BLD AUTO: 0.5 10E3/UL (ref 0–1.3)
MONOCYTES NFR BLD AUTO: 9 %
NEUTROPHILS # BLD AUTO: 3.4 10E3/UL (ref 1.6–8.3)
NEUTROPHILS NFR BLD AUTO: 61 %
NRBC # BLD AUTO: 0 10E3/UL
NRBC BLD AUTO-RTO: 0 /100
PLATELET # BLD AUTO: 86 10E3/UL (ref 150–450)
POTASSIUM BLD-SCNC: 4.4 MMOL/L (ref 3.4–5.3)
PROT SERPL-MCNC: 6.9 G/DL (ref 6.8–8.8)
RBC # BLD AUTO: 3.43 10E6/UL (ref 4.4–5.9)
SODIUM SERPL-SCNC: 132 MMOL/L (ref 133–144)
WBC # BLD AUTO: 5.6 10E3/UL (ref 4–11)

## 2021-09-02 PROCEDURE — 83615 LACTATE (LD) (LDH) ENZYME: CPT | Performed by: INTERNAL MEDICINE

## 2021-09-02 PROCEDURE — 99214 OFFICE O/P EST MOD 30 MIN: CPT | Performed by: INTERNAL MEDICINE

## 2021-09-02 PROCEDURE — 80053 COMPREHEN METABOLIC PANEL: CPT | Performed by: INTERNAL MEDICINE

## 2021-09-02 PROCEDURE — 36415 COLL VENOUS BLD VENIPUNCTURE: CPT

## 2021-09-02 PROCEDURE — 85025 COMPLETE CBC W/AUTO DIFF WBC: CPT | Performed by: INTERNAL MEDICINE

## 2021-09-02 PROCEDURE — G0463 HOSPITAL OUTPT CLINIC VISIT: HCPCS

## 2021-09-02 ASSESSMENT — MIFFLIN-ST. JEOR: SCORE: 1662.47

## 2021-09-02 ASSESSMENT — PAIN SCALES - GENERAL: PAINLEVEL: NO PAIN (1)

## 2021-09-02 NOTE — PROGRESS NOTES
[facility]    Hematology/Oncology Established Patient Note      Today's Date: 9/02/21    Reason for Follow-up: CLL      HISTORY OF PRESENT ILLNESS: Austin Garza is a 81 year old male with PMHx of DMII, HTN who presented with leukocytosis.  In November 2017, he was found to have elevated WBC of 61.7K, hemoglobin of 10.4, and platelet of 115K.  There were no other CBC results available between 2010 and 2017.  Prior to 2010, he had normal CBC, with normal to mild anemia, and mild thrombocytopenia.   He was asymptomatic.    He underwent bone marrow biopsy on 11/21/17, which was consistent with chronic lymphocytic leukemia/small lymphocytic lymphoma, with 13% ringed sideroblasts identified.  The presence of increased numbers of ringed sideroblasts raises the possibility of an associated myelodysplastic syndrome.  Dysplastic changes are not identified though.  Many cases exhibiting ringed sideroblasts are metabolic origin, without significant risk of progression to acute leukemia, and these typically do not show dysplastic morphology as is the case with this specimen.  15% ringed sideroblasts are required for a diagnosis for RARS, which this specimen does not meet.  Flow cytometry is also consistent with CLL/SLL.  Cytogenetics show two (10%) of the metaphase cells comprise a clone characterized by a deletion within the proximal long arm of a chromosome 13 as the sole karyotypic abnormality.  Three (15%) metaphases had loss of the Y chromosome as the sole abnormality.    CT scan on 11/29/17 shows mildly enlarged left axillary lymph node and periaortic lymph nodes in the retroperitoneum of the abdomen.  Spleen is also enlarged.    On his staging CT scan for CLL, he was incidentally found to have a large infrarenal abdominal aortic aneurysm, measuring 7.6 cm.  He was seen by Dr. Thomas, and he underwent EVAR on 12/4/17.     He started ibrutinib on 5/4/20.  It was held 5/28/20-6/4/20 for tooth  extraction.    He was hospitalized on 7/30/2021 for shortness of breath, new CHF, A. fib with RVR and ibrutinib was put on hold.  He was seen by cardiology and eventually underwent cardioversion for the A. fib.  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  He was started on Eliquis and metoprolol.  His Plavix was stopped.      INTERIM HISTORY: Austin says that he is doing better.  He was hospitalized again last week due to back pain.  It was felt that it was musculoskeletal.  He is undergoing physical therapy.          REVIEW OF SYSTEMS:   14 point ROS was reviewed and is negative other than as noted above in HPI.       HOME MEDICATIONS:  Current Outpatient Medications   Medication Sig Dispense Refill     ACCU-CHEK GUIDE test strip Use to test blood sugar 2 times daily or as directed. 100 strip 1     blood glucose monitoring (NO BRAND SPECIFIED) meter device kit Use to test blood sugar 2 times daily or as directed. 1 kit 0     CONTOUR NEXT TEST test strip USE TO TEST BLOOD SUGAR 1-2 TIMES DAILY OR AS DIRECTED (ADJUSTING ORAL MEDICATIONS). 100 strip 1     ibrutinib (IMBRUVICA) 420 MG tablet Take 420 mg by mouth daily Do not take until you hear from Dr. Perry's office. 28 tablet 0     metFORMIN (GLUCOPHAGE-XR) 500 MG 24 hr tablet Take 2 tablets (1,000 mg) by mouth every morning (Patient taking differently: Take 500 mg by mouth 2 times daily (with meals) ) 180 tablet 1     metoprolol tartrate (LOPRESSOR) 50 MG tablet Take 1 tablet (50 mg) by mouth 2 times daily 60 tablet 0     Multiple Vitamins-Minerals (CENTRUM SILVER) per tablet Take 1 tablet by mouth daily       rosuvastatin (CRESTOR) 20 MG tablet Take 1 tablet (20 mg) by mouth At Bedtime (Patient taking differently: Take 20 mg by mouth daily ) 30 tablet 0     apixaban ANTICOAGULANT (ELIQUIS) 5 MG tablet Take 5 mg twice daily. Please DO NOT take this medication until you are instructed to restart this by your Oncologist. (Patient not  taking: Reported on 8/31/2021) 60 tablet 0     desonide (DESOWEN) 0.05 % external ointment Apply topically 2 times daily To forehead x 2 weeks (Patient not taking: Reported on 8/18/2021)           ALLERGIES:  Allergies   Allergen Reactions     Ace Inhibitors      cough         PAST MEDICAL HISTORY:  Past Medical History:   Diagnosis Date     AAA (abdominal aortic aneurysm)      BCC (basal cell carcinoma of skin) - face      CLL (chronic lymphocytic leukemia)      Diaphragmatic hernia      Diverticulitis of colon      Esophageal reflux      Essential hypertension      Hyperlipidemia      Irritable bowel syndrome      Macular degeneration (senile) of retina      Squamous Cell Carcinoma, Back 1988     Type 2 diabetes mellitus          PAST SURGICAL HISTORY:  Past Surgical History:   Procedure Laterality Date     ANESTHESIA CARDIOVERSION N/A 8/2/2021    Procedure: ANESTHESIA, FOR CARDIOVERSION;  Surgeon: GENERIC ANESTHESIA PROVIDER;  Location: [facility] OR     APPENDECTOMY OPEN  1966     BONE MARROW BIOPSY, BONE SPECIMEN, NEEDLE/TROCAR N/A 11/21/2017    Procedure: BIOPSY BONE MARROW;  BONE MARROW BIOPSY;  Surgeon: Shady Garcia MD;  Location:  GI     COLONOSCOPY  2002     ENDOVASCULAR REPAIR ANEURYSM ABDOMINAL AORTA N/A 12/4/2017    Procedure: ENDOVASCULAR REPAIR ANEURYSM ABDOMINAL AORTA;  ENDOVASCULAR REPAIR ANEURYSM ABDOMINAL AORTA;  Surgeon: Milton Cazares MD;  Location:  OR     Fistulotomy with marsupialization for repair of fisture in ano  2007     IR ABDOMINAL AORTOGRAM  3/11/2019     IR VISCERAL EMBOLIZATION  5/13/2019     Multiple skin tags - resection  1998     Squamous cell skin cancer resection - back  1985-90         SOCIAL HISTORY:  Social History     Socioeconomic History     Marital status:      Spouse name: librado     Number of children: 0     Years of education: 17     Highest education level: Not on file   Occupational History     Occupation: retired   Tobacco Use     Smoking  status: Former Smoker     Packs/day: 0.50     Years: 20.00     Pack years: 10.00     Quit date: 1975     Years since quittin.7     Smokeless tobacco: Former User   Substance and Sexual Activity     Alcohol use: Yes     Alcohol/week: 10.0 - 14.0 standard drinks     Types: 10 - 14 Standard drinks or equivalent per week     Comment: 3 drinks a day/wine     Drug use: No     Sexual activity: Never   Other Topics Concern     Parent/sibling w/ CABG, MI or angioplasty before 65F 55M? Not Asked   Social History Narrative     Not on file     Social Determinants of Health     Financial Resource Strain:      Difficulty of Paying Living Expenses:    Food Insecurity:      Worried About Running Out of Food in the Last Year:      Ran Out of Food in the Last Year:    Transportation Needs:      Lack of Transportation (Medical):      Lack of Transportation (Non-Medical):    Physical Activity:      Days of Exercise per Week:      Minutes of Exercise per Session:    Stress:      Feeling of Stress :    Social Connections:      Frequency of Communication with Friends and Family:      Frequency of Social Gatherings with Friends and Family:      Attends [employer] Services:      Active Member of Clubs or Organizations:      Attends Club or Organization Meetings:      Marital Status:    Intimate Partner Violence: Not At Risk     Fear of Current or Ex-Partner: No     Emotionally Abused: No     Physically Abused: No     Sexually Abused: No   He quit smoking in .  He drinks 1-3 glasses of wine a night with dinner.  He is retired, and used to work as a .  He lives with his wife in [place].  His cousin had lung cancer and  around age 69.  Otherwise, he denies family history of cancer.        FAMILY HISTORY:  Family History   Problem Relation Age of Onset     Cerebrovascular Disease Father         x 2     Hypertension Mother      C.A.D. Mother      Cancer Mother         skin     Eye Disorder Mother         glaucoma      "Prostate Cancer No family hx of      Cancer - colorectal No family hx of      Glaucoma No family hx of      Macular Degeneration No family hx of          PHYSICAL EXAM:  BP (!) 160/86   Pulse 96   Temp 97  F (36.1  C) (Tympanic)   Resp 16   Ht 1.753 m (5' 9\")   Wt 95.7 kg (211 lb)   SpO2 98%   BMI 31.16 kg/m    ECO  GENERAL/CONSTITUTIONAL: No acute distress.  EYES: No scleral icterus.  NEUROLOGIC: Alert, oriented, answers questions appropriately.  INTEGUMENTARY: No jaundice.    GAIT: Steady, does not use assistive device      LABS:  CBC RESULTS: Recent Labs   Lab Test 21   WBC 5.6   RBC 3.43*   HGB 10.9*   HCT 33.5*   MCV 98   MCH 31.8   MCHC 32.5   RDW 14.1   PLT 86*     Recent Labs   Lab Test 21  0930 21  1340 21  0643   *  --  135   POTASSIUM 4.4  --  3.9   CHLORIDE 101  --  106   CO2 27  --  23   ANIONGAP 4  --  6   * 227* 209*   BUN 27  --  21   CR 1.46*  --  1.41*   MAGDALENO 8.6  --  8.6     Lab Results   Component Value Date    AST 51 2021    AST 15 2021     Lab Results   Component Value Date     2021    ALT 21 2021     Lab Results   Component Value Date    BILICONJ 0.0 2005      Lab Results   Component Value Date    BILITOTAL 0.7 2021    BILITOTAL 0.7 2021     Lab Results   Component Value Date    ALBUMIN 3.2 2021    ALBUMIN 3.6 2021     Lab Results   Component Value Date    PROTTOTAL 6.9 2021    PROTTOTAL 7.0 2021      Lab Results   Component Value Date    ALKPHOS 186 2021    ALKPHOS 58 2021             ASSESSMENT/PLAN:  Austin Garza is a 81 year old male with:    1) CLL/SLL: BLACKWOOD stage III, with lymphocytosis, lymphadenopathy, splenomegaly, and anemia.  He has mild thrombocytopenia as well, but is above 100K.  He presented with leukocytosis with WBC of 61.7K, hemoglobin of 10.4, and platelet count of 115K on diagnosis.  Bone marrow biopsy and flow cytometry " confirmed CLL/SLL.  CT scan shows mildly enlarged left axillary lymph node and periaortic lymph nodes in the retroperitoneum of the abdomen, with enlarged spleen.      Due to worsening lymphocyte count, anemia, thrombocytopenia, as well as fatigue and night sweats, he started ibrutinib on 5/4/20.      His WBC is normalized now, with stable hemoglobin and platelet count.    In light of his recent issues with atrial fibrillation and now on Eliquis, his ibrutinib has been on hold since August 2021.  Considering his CLL is under good control, his WBC/ is actually on the lower end of normal, and with risk of atrial fibrillation and bleeding on anticoagulants, we will continue to hold ibrutinib for now and monitor his counts.  He will likely need to resume treatment at some point, but we can follow the counts and we could also consider other treatments as well for CLL.      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 He completed radiation at [facility] with planned 60 Gy x 30 fractions. He has completed radiation and noted that he did not need any more follow-up for this.    3) Abdominal aortic aneurysm: measures up to 7.6 cm on CT scan, incidentally found.  He underwent EVAR on 12/4/17.  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  He is on Plavix now.  On 5/13/19, he underwent and percutaneous aneurysm sac puncture and endo leak embolization by IR on 5/13/19.  -follow-up with vascular surgery and IR    4) Diabetes, hypertension:  Glucose is in the 400's today on non-fasting labs.  He says that he has appointment with his PCP tomorrow.  -following with PCP    5) Atrial fibrillation with RVR s/p successful DCCV to SR:  -on metoprolol and Eliquis  -following with cardiology    6) CAD:  He had an abnormal stress test.  He will follow-up with cardiology, who recommended CT coronoary angiogram as outpatient and follow-up in cardiology clinic    7) Thrombocytopenia: Platelet count was down to 40K last week when he was hospitalized.  He says that his got his COVID booster shot a couple of weeks ago, prior to that hospitalization, and that could have caused the transient decrease in platelet count.  Today, his platelet count is improved up to 86K, which is closer to his baseline.  He can resume the Eliquis, now that platelet count is >50K.  -monitor CBC      Breana Perry MD  Hematology/Oncology  [facility]      Total time spent on day of visit, including review of tests, obtaining/reviewing separately obtained history, ordering medications/tests/procedures, communicating with PCP/consultants, and documenting in electronic medical record: 30 minutes

## 2021-09-02 NOTE — LETTER
9/2/2021         RE: Austin Garza  [address] [place] [address]        Dear Colleague,    Thank you for referring your patient, Austin Garza, to the [facility]. Please see a copy of my visit note below.    [facility]    Hematology/Oncology Established Patient Note      Today's Date: 9/02/21    Reason for Follow-up: CLL      HISTORY OF PRESENT ILLNESS: Austin Garza is a 81 year old male with PMHx of DMII, HTN who presented with leukocytosis.  In November 2017, he was found to have elevated WBC of 61.7K, hemoglobin of 10.4, and platelet of 115K.  There were no other CBC results available between 2010 and 2017.  Prior to 2010, he had normal CBC, with normal to mild anemia, and mild thrombocytopenia.   He was asymptomatic.    He underwent bone marrow biopsy on 11/21/17, which was consistent with chronic lymphocytic leukemia/small lymphocytic lymphoma, with 13% ringed sideroblasts identified.  The presence of increased numbers of ringed sideroblasts raises the possibility of an associated myelodysplastic syndrome.  Dysplastic changes are not identified though.  Many cases exhibiting ringed sideroblasts are metabolic origin, without significant risk of progression to acute leukemia, and these typically do not show dysplastic morphology as is the case with this specimen.  15% ringed sideroblasts are required for a diagnosis for RARS, which this specimen does not meet.  Flow cytometry is also consistent with CLL/SLL.  Cytogenetics show two (10%) of the metaphase cells comprise a clone characterized by a deletion within the proximal long arm of a chromosome 13 as the sole karyotypic abnormality.  Three (15%) metaphases had loss of the Y chromosome as the sole abnormality.    CT scan on 11/29/17 shows mildly enlarged left axillary lymph node and periaortic lymph nodes in the retroperitoneum of the abdomen.  Spleen is also enlarged.    On his  staging CT scan for CLL, he was incidentally found to have a large infrarenal abdominal aortic aneurysm, measuring 7.6 cm.  He was seen by Dr. Thomas, and he underwent EVAR on 12/4/17.     He started ibrutinib on 5/4/20.  It was held 5/28/20-6/4/20 for tooth extraction.    He was hospitalized on 7/30/2021 for shortness of breath, new CHF, A. fib with RVR and ibrutinib was put on hold.  He was seen by cardiology and eventually underwent cardioversion for the A. fib.  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  He was started on Eliquis and metoprolol.  His Plavix was stopped.      INTERIM HISTORY: Austin says that he is doing better.  He was hospitalized again last week due to back pain.  It was felt that it was musculoskeletal.  He is undergoing physical therapy.          REVIEW OF SYSTEMS:   14 point ROS was reviewed and is negative other than as noted above in HPI.       HOME MEDICATIONS:  Current Outpatient Medications   Medication Sig Dispense Refill     ACCU-CHEK GUIDE test strip Use to test blood sugar 2 times daily or as directed. 100 strip 1     blood glucose monitoring (NO BRAND SPECIFIED) meter device kit Use to test blood sugar 2 times daily or as directed. 1 kit 0     CONTOUR NEXT TEST test strip USE TO TEST BLOOD SUGAR 1-2 TIMES DAILY OR AS DIRECTED (ADJUSTING ORAL MEDICATIONS). 100 strip 1     ibrutinib (IMBRUVICA) 420 MG tablet Take 420 mg by mouth daily Do not take until you hear from Dr. Perry's office. 28 tablet 0     metFORMIN (GLUCOPHAGE-XR) 500 MG 24 hr tablet Take 2 tablets (1,000 mg) by mouth every morning (Patient taking differently: Take 500 mg by mouth 2 times daily (with meals) ) 180 tablet 1     metoprolol tartrate (LOPRESSOR) 50 MG tablet Take 1 tablet (50 mg) by mouth 2 times daily 60 tablet 0     Multiple Vitamins-Minerals (CENTRUM SILVER) per tablet Take 1 tablet by mouth daily       rosuvastatin (CRESTOR) 20 MG tablet Take 1 tablet (20 mg) by mouth At  Bedtime (Patient taking differently: Take 20 mg by mouth daily ) 30 tablet 0     apixaban ANTICOAGULANT (ELIQUIS) 5 MG tablet Take 5 mg twice daily. Please DO NOT take this medication until you are instructed to restart this by your Oncologist. (Patient not taking: Reported on 8/31/2021) 60 tablet 0     desonide (DESOWEN) 0.05 % external ointment Apply topically 2 times daily To forehead x 2 weeks (Patient not taking: Reported on 8/18/2021)           ALLERGIES:  Allergies   Allergen Reactions     Ace Inhibitors      cough         PAST MEDICAL HISTORY:  Past Medical History:   Diagnosis Date     AAA (abdominal aortic aneurysm)      BCC (basal cell carcinoma of skin) - face      CLL (chronic lymphocytic leukemia)      Diaphragmatic hernia      Diverticulitis of colon      Esophageal reflux      Essential hypertension      Hyperlipidemia      Irritable bowel syndrome      Macular degeneration (senile) of retina      Squamous Cell Carcinoma, Back 1988     Type 2 diabetes mellitus          PAST SURGICAL HISTORY:  Past Surgical History:   Procedure Laterality Date     ANESTHESIA CARDIOVERSION N/A 8/2/2021    Procedure: ANESTHESIA, FOR CARDIOVERSION;  Surgeon: GENERIC ANESTHESIA PROVIDER;  Location: [facility] OR     APPENDECTOMY OPEN  1966     BONE MARROW BIOPSY, BONE SPECIMEN, NEEDLE/TROCAR N/A 11/21/2017    Procedure: BIOPSY BONE MARROW;  BONE MARROW BIOPSY;  Surgeon: Shady Garcia MD;  Location:  GI     COLONOSCOPY  2002     ENDOVASCULAR REPAIR ANEURYSM ABDOMINAL AORTA N/A 12/4/2017    Procedure: ENDOVASCULAR REPAIR ANEURYSM ABDOMINAL AORTA;  ENDOVASCULAR REPAIR ANEURYSM ABDOMINAL AORTA;  Surgeon: Milton Cazares MD;  Location:  OR     Fistulotomy with marsupialization for repair of fisture in ano  2007     IR ABDOMINAL AORTOGRAM  3/11/2019     IR VISCERAL EMBOLIZATION  5/13/2019     Multiple skin tags - resection  1998     Squamous cell skin cancer resection - back  1985-90         SOCIAL HISTORY:  Social  History     Socioeconomic History     Marital status:      Spouse name: librado     Number of children: 0     Years of education: 17     Highest education level: Not on file   Occupational History     Occupation: retired   Tobacco Use     Smoking status: Former Smoker     Packs/day: 0.50     Years: 20.00     Pack years: 10.00     Quit date: 1975     Years since quittin.7     Smokeless tobacco: Former User   Substance and Sexual Activity     Alcohol use: Yes     Alcohol/week: 10.0 - 14.0 standard drinks     Types: 10 - 14 Standard drinks or equivalent per week     Comment: 3 drinks a day/wine     Drug use: No     Sexual activity: Never   Other Topics Concern     Parent/sibling w/ CABG, MI or angioplasty before 65F 55M? Not Asked   Social History Narrative     Not on file     Social Determinants of Health     Financial Resource Strain:      Difficulty of Paying Living Expenses:    Food Insecurity:      Worried About Running Out of Food in the Last Year:      Ran Out of Food in the Last Year:    Transportation Needs:      Lack of Transportation (Medical):      Lack of Transportation (Non-Medical):    Physical Activity:      Days of Exercise per Week:      Minutes of Exercise per Session:    Stress:      Feeling of Stress :    Social Connections:      Frequency of Communication with Friends and Family:      Frequency of Social Gatherings with Friends and Family:      Attends Jainism Services:      Active Member of Clubs or Organizations:      Attends Club or Organization Meetings:      Marital Status:    Intimate Partner Violence: Not At Risk     Fear of Current or Ex-Partner: No     Emotionally Abused: No     Physically Abused: No     Sexually Abused: No   He quit smoking in .  He drinks 1-3 glasses of wine a night with dinner.  He is retired, and used to work as a .  He lives with his wife in [place].  His cousin had lung cancer and  around age 69.  Otherwise, he denies family history of  "cancer.        FAMILY HISTORY:  Family History   Problem Relation Age of Onset     Cerebrovascular Disease Father         x 2     Hypertension Mother      C.A.D. Mother      Cancer Mother         skin     Eye Disorder Mother         glaucoma     Prostate Cancer No family hx of      Cancer - colorectal No family hx of      Glaucoma No family hx of      Macular Degeneration No family hx of          PHYSICAL EXAM:  BP (!) 160/86   Pulse 96   Temp 97  F (36.1  C) (Tympanic)   Resp 16   Ht 1.753 m (5' 9\")   Wt 95.7 kg (211 lb)   SpO2 98%   BMI 31.16 kg/m    ECO  GENERAL/CONSTITUTIONAL: No acute distress.  EYES: No scleral icterus.  NEUROLOGIC: Alert, oriented, answers questions appropriately.  INTEGUMENTARY: No jaundice.    GAIT: Steady, does not use assistive device      LABS:  CBC RESULTS: Recent Labs   Lab Test 21   WBC 5.6   RBC 3.43*   HGB 10.9*   HCT 33.5*   MCV 98   MCH 31.8   MCHC 32.5   RDW 14.1   PLT 86*     Recent Labs   Lab Test 21  0930 21  1340 21  0643   *  --  135   POTASSIUM 4.4  --  3.9   CHLORIDE 101  --  106   CO2 27  --  23   ANIONGAP 4  --  6   * 227* 209*   BUN 27  --  21   CR 1.46*  --  1.41*   MAGDALENO 8.6  --  8.6     Lab Results   Component Value Date    AST 51 2021    AST 15 2021     Lab Results   Component Value Date     2021    ALT 21 2021     Lab Results   Component Value Date    BILICONJ 0.0 2005      Lab Results   Component Value Date    BILITOTAL 0.7 2021    BILITOTAL 0.7 2021     Lab Results   Component Value Date    ALBUMIN 3.2 2021    ALBUMIN 3.6 2021     Lab Results   Component Value Date    PROTTOTAL 6.9 2021    PROTTOTAL 7.0 2021      Lab Results   Component Value Date    ALKPHOS 186 2021    ALKPHOS 58 2021             ASSESSMENT/PLAN:  Austin Garza is a 81 year old male with:    1) CLL/SLL: BLACKWOOD stage III, with lymphocytosis, " lymphadenopathy, splenomegaly, and anemia.  He has mild thrombocytopenia as well, but is above 100K.  He presented with leukocytosis with WBC of 61.7K, hemoglobin of 10.4, and platelet count of 115K on diagnosis.  Bone marrow biopsy and flow cytometry confirmed CLL/SLL.  CT scan shows mildly enlarged left axillary lymph node and periaortic lymph nodes in the retroperitoneum of the abdomen, with enlarged spleen.      Due to worsening lymphocyte count, anemia, thrombocytopenia, as well as fatigue and night sweats, he started ibrutinib on 5/4/20.      His WBC is normalized now, with stable hemoglobin and platelet count.    In light of his recent issues with atrial fibrillation and now on Eliquis, his ibrutinib has been on hold since August 2021.  Considering his CLL is under good control, his WBC/ is actually on the lower end of normal, and with risk of atrial fibrillation and bleeding on anticoagulants, we will continue to hold ibrutinib for now and monitor his counts.  He will likely need to resume treatment at some point, but we can follow the counts and we could also consider other treatments as well for CLL.      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 He completed radiation at [facility] with planned 60 Gy x 30 fractions. He has completed radiation and noted that he did not need any more follow-up for this.    3) Abdominal aortic aneurysm: measures up to 7.6 cm on CT scan, incidentally found.  He underwent EVAR on 12/4/17.  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  He is on Plavix now.  On 5/13/19, he underwent and percutaneous aneurysm sac puncture and endo leak embolization by IR on 5/13/19.  -follow-up with vascular surgery and IR    4) Diabetes, hypertension:   Glucose is in the 400's today on non-fasting labs.  He says that he has appointment with his PCP tomorrow.  -following with PCP    5) Atrial fibrillation with RVR s/p successful DCCV to SR:  -on metoprolol and Eliquis  -following with cardiology    6) CAD: He had an abnormal stress test.  He will follow-up with cardiology, who recommended CT coronoary angiogram as outpatient and follow-up in cardiology clinic    7) Thrombocytopenia: Platelet count was down to 40K last week when he was hospitalized.  He says that his got his COVID booster shot a couple of weeks ago, prior to that hospitalization, and that could have caused the transient decrease in platelet count.  Today, his platelet count is improved up to 86K, which is closer to his baseline.  He can resume the Eliquis, now that platelet count is >50K.  -monitor CBC      Breana Perry MD  Hematology/Oncology  [facility]      Total time spent on day of visit, including review of tests, obtaining/reviewing separately obtained history, ordering medications/tests/procedures, communicating with PCP/consultants, and documenting in electronic medical record: 30 minutes      Again, thank you for allowing me to participate in the care of your patient.        Sincerely,        Breana Perry MD

## 2021-09-02 NOTE — PROGRESS NOTES
Medical Assistant Note:  Austin Garza presents today for blood draw.    Patient seen by provider today: Yes: Dr. Perry.   present during visit today: Not Applicable.    Concerns: No Concerns.    Procedure:  Labs drawn    Post Assessment:  Labs drawn without difficulty: Yes.    Discharge Plan:  Departure Mode: Ambulatory.    Face to Face Time: 10 min.    [facility]

## 2021-09-02 NOTE — NURSING NOTE
"Oncology Rooming Note    September 2, 2021 9:36 AM   Austin Garza is a 81 year old male who presents for:    Chief Complaint   Patient presents with     Oncology Clinic Visit     CLL (chronic lymphocytic leukemia)     Initial Vitals: BP (!) 160/86   Pulse 96   Temp 97  F (36.1  C) (Tympanic)   Resp 16   Ht 1.753 m (5' 9\")   Wt 95.7 kg (211 lb)   SpO2 98%   BMI 31.16 kg/m   Estimated body mass index is 31.16 kg/m  as calculated from the following:    Height as of this encounter: 1.753 m (5' 9\").    Weight as of this encounter: 95.7 kg (211 lb). Body surface area is 2.16 meters squared.  No Pain (1) Comment: Data Unavailable   No LMP for male patient.  Allergies reviewed: Yes  Medications reviewed: Yes    Medications: Medication refills not needed today.  Pharmacy name entered into [facility]: [id number] [place] - [address] AT [facility]    Clinical concerns: follow up       Ita Pinto CMA              "

## 2021-09-02 NOTE — ORAL ONC MGMT
Oral Chemotherapy Monitoring Program  Lab Follow Up    Reviewed lab results from 9/2/21.    ORAL CHEMOTHERAPY 11/3/2020 11/4/2020 12/8/2020 2/8/2021 7/13/2021 8/10/2021 9/2/2021   Assessment Type Refill Refill Monthly Follow up Refill Lab Monitoring Lab Monitoring Lab Monitoring   Diagnosis Code Chronic Lymphocytic Leukemia (CLL) Chronic Lymphocytic Leukemia (CLL) Chronic Lymphocytic Leukemia (CLL) Chronic Lymphocytic Leukemia (CLL) Chronic Lymphocytic Leukemia (CLL) Chronic Lymphocytic Leukemia (CLL) Chronic Lymphocytic Leukemia (CLL)   Providers Dr. Orlando Perry   Clinic Name/Location [facility]   Drug Name Imbruvica (Ibrutinib) - Imbruvica (Ibrutinib) Imbruvica (Ibrutinib) Imbruvica (ibrutinib) Imbruvica (ibrutinib) Imbruvica (ibrutinib)   Dose 420 mg - 420 mg 420 mg 420 mg 420 mg Other:   Current Schedule Daily - Daily Daily Daily Daily -   Cycle Details Continuous - Continuous Continuous Continuous Continuous Drug on Hold   Start Date of Last Cycle - - - - - - -   Planned next cycle start date - - 12/14/2020 - - - -   Doses missed in last 2 weeks - - 0 - 1 - -   Adherence Assessment - - Adherent - Adherent - -   Reason for Non-adherence - - - - - - -   Adherence Intervention Recommended - - - - - - -   Adverse Effects - - Other (See Note for Details) Neutropenia Fatigue - Thrombocytopenia   Diarrhea - - - - - - -   Pharmacist Intervention(diarrhea) - - - - - - -   Fatigue - - - - Grade 1 - -   Pharmacist Intervention(fatigue) - - - - No - -   Neutropenia - - - Grade 3 - - -   Pharmacist Intervention(neutropenia) - - - No - - -   Thrombocytopenia - - - - - - Grade 1   Pharmacist Intervention(thrombocytopenia) - - - - - - No   Other (See Note for Details) - - bruising, muscle cramp - - - -   Pharmacist intervention(other) - - No - - - -   Any new drug interactions? - - No - No - -   Pharmacist  Intervention? - - - - - - -   Intervention(s) - - - - - - -   Is the dose as ordered appropriate for the patient? - - Yes - Yes - -   Is the patient currently in pain? - - No - No - -   Has the patient been assessed within the past 6 months for depression? - - No - Yes - -   Has the patient missed any days of school, work, or other routine activity? - - No - No - -   Since the last time we talked, have you been hospitalized or used the emergency room? - - Yes - No - -   What medical service did you use? - - Emergency Room - - - -   How many emergency room visits? - - (No Data) - - - -   How many emergency room visits were related to the cancer medication? - - 0 - - - -       Labs:  _  Result Component Current Result Ref Range   Sodium 132 (L) (9/2/2021) 133 - 144 mmol/L     _  Result Component Current Result Ref Range   Potassium 4.4 (9/2/2021) 3.4 - 5.3 mmol/L     _  Result Component Current Result Ref Range   Calcium 8.6 (9/2/2021) 8.5 - 10.1 mg/dL     No results found for Mag within last 30 days.     No results found for Phos within last 30 days.     _  Result Component Current Result Ref Range   Albumin 3.2 (L) (9/2/2021) 3.4 - 5.0 g/dL     _  Result Component Current Result Ref Range   Urea Nitrogen 27 (9/2/2021) 7 - 30 mg/dL     _  Result Component Current Result Ref Range   Creatinine 1.46 (H) (9/2/2021) 0.66 - 1.25 mg/dL     _  Result Component Current Result Ref Range   AST 51 (H) (9/2/2021) 0 - 45 U/L     _  Result Component Current Result Ref Range    (H) (9/2/2021) 0 - 70 U/L     _  Result Component Current Result Ref Range   Bilirubin Total 0.7 (9/2/2021) 0.2 - 1.3 mg/dL     _  Result Component Current Result Ref Range   WBC Count 5.6 (9/2/2021) 4.0 - 11.0 10e3/uL     _  Result Component Current Result Ref Range   Hemoglobin 10.9 (L) (9/2/2021) 13.3 - 17.7 g/dL     _  Result Component Current Result Ref Range   Platelet Count 86 (L) (9/2/2021) 150 - 450 10e3/uL     No results found for ANC within  last 30 days.       Assessment & Plan:  Grade 1 thrombocytopenia possible d/t a recent covid vaccine.  It has improved from 40 to 86,000 today.    Imbruvica has been on hold since 7/30/21 d/t afib with rapid ventricular response.  Plan is to continue to hold for now as his disease is controlled and reassess in December.    Follow-Up:    12/6/21 labs      Marty Meese, Pharm.GINNY., BCOP

## 2021-09-03 ENCOUNTER — VIRTUAL VISIT (OUTPATIENT)
Dept: PEDIATRICS | Facility: CLINIC | Age: 81
End: 2021-09-03
Payer: COMMERCIAL

## 2021-09-03 DIAGNOSIS — D61.89 ANEMIA DUE TO OTHER BONE MARROW FAILURE (H): ICD-10-CM

## 2021-09-03 DIAGNOSIS — E11.649 TYPE 2 DIABETES MELLITUS WITH HYPOGLYCEMIA WITHOUT COMA, WITHOUT LONG-TERM CURRENT USE OF INSULIN (H): Primary | ICD-10-CM

## 2021-09-03 DIAGNOSIS — I48.91 ATRIAL FIBRILLATION, UNSPECIFIED TYPE (H): ICD-10-CM

## 2021-09-03 PROCEDURE — 99214 OFFICE O/P EST MOD 30 MIN: CPT | Mod: 95 | Performed by: INTERNAL MEDICINE

## 2021-09-03 RX ORDER — METFORMIN HCL 500 MG
1000 TABLET, EXTENDED RELEASE 24 HR ORAL 2 TIMES DAILY WITH MEALS
Qty: 180 TABLET | Refills: 3 | Status: SHIPPED | OUTPATIENT
Start: 2021-09-03 | End: 2021-09-10

## 2021-09-03 NOTE — PROGRESS NOTES
Austin is a 81 year old who is being evaluated via a billable telephone visit.      What phone number would you like to be contacted at? [phone]   How would you like to obtain your AVS? MyChart    Assessment & Plan     Type 2 diabetes mellitus with hypoglycemia without coma, without long-term current use of insulin (H)  With recent hyperglycemia.  Increase metformin from 500 mg BID to 1000 mg BID. Re-check A1c in 2 months, and follow up at that time.   - metFORMIN (GLUCOPHAGE-XR) 500 MG 24 hr tablet; Take 2 tablets (1,000 mg) by mouth 2 times daily (with meals)  - Hemoglobin A1c; Future    Atrial fibrillation, unspecified type (H)  New diagnosis this month, now anticoagulated on eliquis.     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                    Return in about 2 months (around 11/3/2021) for Follow up.    Vanessa Munson MD  [facility] [place]    Subjective   Austin is a 81 year old who presents for the following health issues     HPI     Diabetes Follow-up    How often are you checking your blood sugar? A few times a week  What time of day are you checking your blood sugars (select all that apply)?  7-730am  Have you had any blood sugars above 200?  Yes 320 09/03/21  Have you had any blood sugars below 70?  No    What symptoms do you notice when your blood sugar is low?  None    What concerns do you have today about your diabetes? Blood sugar is often over 200     Do you have any of these symptoms? (Select all that apply)  Numbness in feet and Excessive thirst    He has developed some thoracic back pain.  Sometimes it's quite severe.  On 7/30 it was centered under the chest.  Went to the ED, diagnosed with atrial fibrillation. It then happened again in the middle of the day 8/23, back to the ED. Fentanyl given in the ambulance, and the pain was better. Cardiac workup was negative, but blood sugars were high.     The pain comes back intermittently, he does get  some relief from tylenol. Started PT for the back pain this week, feels it is already helping.     At each medical visit this week (Onc clinic, ED visit, etc), everyone checks blood sugar and it's been generally high. At home he checks after going to the gym and first thing in the morning - those numbers have also been high.         BP Readings from Last 2 Encounters:   09/02/21 (!) 160/86   08/31/21 (!) 140/80     Hemoglobin A1C (%)   Date Value   07/30/2021 7.0 (H)   04/05/2021 5.9 (H)   01/15/2021 6.7 (H)     LDL Cholesterol Calculated (mg/dL)   Date Value   04/05/2021 96   01/15/2021 27               How many days per week do you miss taking your medication? 1    What makes it hard for you to take your medications?  remembering to take        Review of Systems   Constitutional, HEENT, cardiovascular, pulmonary, gi and gu systems are negative, except as otherwise noted.      Objective             Physical Exam   healthy, alert and no distress  PSYCH: Alert and oriented times 3; coherent speech, normal   rate and volume, able to articulate logical thoughts, able   to abstract reason, no tangential thoughts, no hallucinations   or delusions  His affect is normal and pleasant  RESP: No cough, no audible wheezing, able to talk in full sentences  Remainder of exam unable to be completed due to telephone visits                Phone call duration: 15 minutes

## 2021-09-05 DIAGNOSIS — I48.91 ATRIAL FIBRILLATION, UNSPECIFIED TYPE (H): ICD-10-CM

## 2021-09-05 NOTE — TELEPHONE ENCOUNTER
Pharmacy note:    Pt requests new Rx: was recently discharged and needs a refill of requested Rx's.            Follow up with Pt, Recently updated in Pt Chart they no longer take Eliquis.

## 2021-09-08 ENCOUNTER — MYC MEDICAL ADVICE (OUTPATIENT)
Dept: PEDIATRICS | Facility: CLINIC | Age: 81
End: 2021-09-08

## 2021-09-08 DIAGNOSIS — N18.31 STAGE 3A CHRONIC KIDNEY DISEASE (H): ICD-10-CM

## 2021-09-08 DIAGNOSIS — E11.649 TYPE 2 DIABETES MELLITUS WITH HYPOGLYCEMIA WITHOUT COMA, WITHOUT LONG-TERM CURRENT USE OF INSULIN (H): Primary | ICD-10-CM

## 2021-09-08 RX ORDER — METOPROLOL TARTRATE 50 MG
50 TABLET ORAL 2 TIMES DAILY
Qty: 60 TABLET | Refills: 0 | Status: SHIPPED | OUTPATIENT
Start: 2021-09-08 | End: 2021-09-13

## 2021-09-08 RX ORDER — ROSUVASTATIN CALCIUM 20 MG/1
20 TABLET, COATED ORAL DAILY
Qty: 90 TABLET | Refills: 3 | Status: SHIPPED | OUTPATIENT
Start: 2021-09-08 | End: 2022-08-03

## 2021-09-08 NOTE — TELEPHONE ENCOUNTER
Routing refill request to provider for review/approval because:  Medication is reported/historical  Refills sent last by [facility] provider, confirm  Alda Esquivel RN, BSN  Message handled by CLINIC NURSE.

## 2021-09-09 ENCOUNTER — THERAPY VISIT (OUTPATIENT)
Dept: PHYSICAL THERAPY | Facility: CLINIC | Age: 81
End: 2021-09-09
Payer: COMMERCIAL

## 2021-09-09 DIAGNOSIS — M54.6 ACUTE MIDLINE THORACIC BACK PAIN: ICD-10-CM

## 2021-09-09 DIAGNOSIS — R53.83 FATIGUE, UNSPECIFIED TYPE: ICD-10-CM

## 2021-09-09 DIAGNOSIS — E11.649 TYPE 2 DIABETES MELLITUS WITH HYPOGLYCEMIA WITHOUT COMA, WITHOUT LONG-TERM CURRENT USE OF INSULIN (H): Primary | ICD-10-CM

## 2021-09-09 DIAGNOSIS — M54.2 CERVICAL PAIN: ICD-10-CM

## 2021-09-09 DIAGNOSIS — R06.02 SHORTNESS OF BREATH: ICD-10-CM

## 2021-09-09 PROCEDURE — 97110 THERAPEUTIC EXERCISES: CPT | Mod: GP | Performed by: PHYSICAL THERAPIST

## 2021-09-09 PROCEDURE — 97112 NEUROMUSCULAR REEDUCATION: CPT | Mod: GP | Performed by: PHYSICAL THERAPIST

## 2021-09-10 ENCOUNTER — OFFICE VISIT (OUTPATIENT)
Dept: PHARMACY | Facility: CLINIC | Age: 81
End: 2021-09-10
Payer: COMMERCIAL

## 2021-09-10 ENCOUNTER — LAB (OUTPATIENT)
Dept: LAB | Facility: CLINIC | Age: 81
End: 2021-09-10
Payer: COMMERCIAL

## 2021-09-10 VITALS
OXYGEN SATURATION: 98 % | WEIGHT: 205 LBS | DIASTOLIC BLOOD PRESSURE: 76 MMHG | SYSTOLIC BLOOD PRESSURE: 131 MMHG | BODY MASS INDEX: 30.27 KG/M2 | HEART RATE: 52 BPM

## 2021-09-10 DIAGNOSIS — E11.649 TYPE 2 DIABETES MELLITUS WITH HYPOGLYCEMIA WITHOUT COMA, WITHOUT LONG-TERM CURRENT USE OF INSULIN (H): Primary | ICD-10-CM

## 2021-09-10 DIAGNOSIS — Z78.9 TAKES DIETARY SUPPLEMENTS: ICD-10-CM

## 2021-09-10 DIAGNOSIS — R53.83 FATIGUE, UNSPECIFIED TYPE: ICD-10-CM

## 2021-09-10 DIAGNOSIS — I10 HYPERTENSION GOAL BP (BLOOD PRESSURE) < 140/90: ICD-10-CM

## 2021-09-10 DIAGNOSIS — I48.91 ATRIAL FIBRILLATION, UNSPECIFIED TYPE (H): ICD-10-CM

## 2021-09-10 DIAGNOSIS — I77.70 ARTERY DISSECTION (H): ICD-10-CM

## 2021-09-10 DIAGNOSIS — C91.10 CLL (CHRONIC LYMPHOCYTIC LEUKEMIA) (H): ICD-10-CM

## 2021-09-10 DIAGNOSIS — R06.02 SHORTNESS OF BREATH: ICD-10-CM

## 2021-09-10 LAB
ALBUMIN UR-MCNC: 30 MG/DL
APPEARANCE UR: CLEAR
BACTERIA #/AREA URNS HPF: ABNORMAL /HPF
BASOPHILS # BLD AUTO: 0 10E3/UL (ref 0–0.2)
BASOPHILS NFR BLD AUTO: 0 %
BILIRUB UR QL STRIP: NEGATIVE
COLOR UR AUTO: YELLOW
CRP SERPL-MCNC: 140 MG/L (ref 0–8)
EOSINOPHIL # BLD AUTO: 0.1 10E3/UL (ref 0–0.7)
EOSINOPHIL NFR BLD AUTO: 1 %
ERYTHROCYTE [DISTWIDTH] IN BLOOD BY AUTOMATED COUNT: 14.4 % (ref 10–15)
ERYTHROCYTE [SEDIMENTATION RATE] IN BLOOD BY WESTERGREN METHOD: 53 MM/HR (ref 0–20)
GLUCOSE UR STRIP-MCNC: 500 MG/DL
HCT VFR BLD AUTO: 33.2 % (ref 40–53)
HGB BLD-MCNC: 10.9 G/DL (ref 13.3–17.7)
HGB UR QL STRIP: ABNORMAL
IMM GRANULOCYTES # BLD: 0.1 10E3/UL
IMM GRANULOCYTES NFR BLD: 1 %
KETONES UR STRIP-MCNC: ABNORMAL MG/DL
LACTATE SERPL-SCNC: 1.9 MMOL/L (ref 0.7–2)
LDH SERPL L TO P-CCNC: 491 U/L (ref 85–227)
LEUKOCYTE ESTERASE UR QL STRIP: NEGATIVE
LYMPHOCYTES # BLD AUTO: 2.5 10E3/UL (ref 0.8–5.3)
LYMPHOCYTES NFR BLD AUTO: 28 %
MCH RBC QN AUTO: 31.1 PG (ref 26.5–33)
MCHC RBC AUTO-ENTMCNC: 32.8 G/DL (ref 31.5–36.5)
MCV RBC AUTO: 95 FL (ref 78–100)
MONOCYTES # BLD AUTO: 0.5 10E3/UL (ref 0–1.3)
MONOCYTES NFR BLD AUTO: 6 %
NEUTROPHILS # BLD AUTO: 5.7 10E3/UL (ref 1.6–8.3)
NEUTROPHILS NFR BLD AUTO: 64 %
NITRATE UR QL: NEGATIVE
NRBC # BLD AUTO: 0 10E3/UL
NRBC BLD AUTO-RTO: 0 /100
NT-PROBNP SERPL-MCNC: 1793 PG/ML (ref 0–450)
PH UR STRIP: 5 [PH] (ref 5–7)
PLATELET # BLD AUTO: 94 10E3/UL (ref 150–450)
RBC # BLD AUTO: 3.5 10E6/UL (ref 4.4–5.9)
RBC #/AREA URNS AUTO: ABNORMAL /HPF
SP GR UR STRIP: <=1.005 (ref 1–1.03)
UROBILINOGEN UR STRIP-ACNC: 0.2 E.U./DL
WBC # BLD AUTO: 8.8 10E3/UL (ref 4–11)
WBC #/AREA URNS AUTO: ABNORMAL /HPF

## 2021-09-10 PROCEDURE — 99606 MTMS BY PHARM EST 15 MIN: CPT | Performed by: PHARMACIST

## 2021-09-10 PROCEDURE — 36415 COLL VENOUS BLD VENIPUNCTURE: CPT

## 2021-09-10 PROCEDURE — 83615 LACTATE (LD) (LDH) ENZYME: CPT

## 2021-09-10 PROCEDURE — 80053 COMPREHEN METABOLIC PANEL: CPT

## 2021-09-10 PROCEDURE — 83615 LACTATE (LD) (LDH) ENZYME: CPT | Mod: 59

## 2021-09-10 PROCEDURE — 83880 ASSAY OF NATRIURETIC PEPTIDE: CPT

## 2021-09-10 PROCEDURE — 85025 COMPLETE CBC W/AUTO DIFF WBC: CPT

## 2021-09-10 PROCEDURE — 81001 URINALYSIS AUTO W/SCOPE: CPT

## 2021-09-10 PROCEDURE — 85652 RBC SED RATE AUTOMATED: CPT

## 2021-09-10 PROCEDURE — 86140 C-REACTIVE PROTEIN: CPT

## 2021-09-10 PROCEDURE — 99607 MTMS BY PHARM ADDL 15 MIN: CPT | Performed by: PHARMACIST

## 2021-09-10 RX ORDER — PEN NEEDLE, DIABETIC 32GX 5/32"
NEEDLE, DISPOSABLE MISCELLANEOUS
Qty: 200 EACH | Refills: 5 | Status: SHIPPED | OUTPATIENT
Start: 2021-09-10 | End: 2021-10-18

## 2021-09-10 RX ORDER — METFORMIN HCL 500 MG
500 TABLET, EXTENDED RELEASE 24 HR ORAL 2 TIMES DAILY WITH MEALS
COMMUNITY
End: 2021-09-23

## 2021-09-10 NOTE — PATIENT INSTRUCTIONS
RTC Dr. Perry in 3 months with labs.  Scheduled labs 12/6 and RTC Dr. Perry 12/7    Arlin, RN, BSN

## 2021-09-10 NOTE — PATIENT INSTRUCTIONS
Recommendations from today's MTM visit:                                                      1. Start Lantus 16 units daily. Please increase by 2 units every 4 days (no sooner) if the fasting sugars continues to be over 150 mg/dL. Max no more than 30 units a day at this time until we meet again.    2. Stop jardiance for now. May keep bottle for possibly the future.    3. Continue the metformin ER 2 tablet a day with food - may be taken at one time.     4. Please start monitor the heartrate goal to stay  beats per minute range.     5. [email or web address]    Follow-up: No follow-ups on file.    It was great to speak with you today.  I value your experience and would be very thankful for your time with providing feedback on our clinic survey. You may receive a survey via email or text message in the next few days.     To schedule another MTM appointment, please call the clinic directly or you may call the MTM scheduling line at [phone] or toll-free at [phone].     My Clinical Pharmacist's contact information:                                                      Please feel free to contact me with any questions or concerns you have.      Klaudia Santa, PharmD  Medication Therapy Management Pharmacist  Pager#: [phone]

## 2021-09-10 NOTE — PROGRESS NOTES
Medication Therapy Management (MTM) Encounter    ASSESSMENT:                            Medication Adherence/Access: No issues identified    Type 2 Diabetes:  Patient is due for a1c recheck but SMBG not at goal and > 300 up to 400's. Given he had a recent low sodium level and hyperglycemia will have him hold the SGLT2 inhibitor to prevent DKA. He prefers to remain on lower metformin - no change recommended. However, I do suggest he start basal insulin for the time being. I would reassess in the future of getting him back on SGLT2 inhibitor.    AAA (repair in 2017)/Afib/HTN: follow-up with cardiology as planned, needs further work-up. Blood pressure today was < 140/90 mm Hg therefore now change. His HR is low I suggest he monitor and discuss with cardiology.    Hyperlipidemia: FLP recheck in the future to ensure LDL < 70. Continue on high intensity statin,    CLL: ct with heme/onc recs and hold brutinib.     Supplements: stable.    PLAN:                            1. Start Lantus/Basaglar 16 units daily - increase by 2 units every 4 days if fasting over 150. mx up to 30 units per day    2. Stop jardiance for now. May keep bottle for possibly the future.    3. Continue the metformin ER 2 tablet a day with food - may be taken at one time.     4. Please start monitor the heartrate goal to stay  beats per minute range.     5. [email or web address]    6. Await lab work    Follow-up: Return in about 13 days (around 9/23/2021) for Medication Therapy Telephone Visit.      SUBJECTIVE/OBJECTIVE:                          Austin Garza is a 81 year old male coming in for a transitions of care visit. He was discharged from [facility] on 8/24/21 for CP. Also hospitalized recently for CP as well on 7/30-8/2. Patient was accompanied by with wife today.     Reason for visit: patient is having symptoms of loss of appetite, weight loss, diarrhea 2 episodes since 9/4, SOB (worsening this morning), tiredness,  weakness, night sweats, increase thirst, more frequent urination. He did have lab work that PCP ordered yesterday regarding these symptoms. Wife is concerned with the medication changes from the hospital he has been doing worse.     Allergies/ADRs: Reviewed in chart  Past Medical History: Reviewed in chart- hearing loss, AAA, artery disection, basal cell carcinoma - face, anemia, CLL, elevated PSA.  Tobacco: He reports that he quit smoking about 46 years ago. He has a 10.00 pack-year smoking history. He has quit using smokeless tobacco.  Alcohol: couple glasses of cocktail and wine with dinner. [place] cut back now. 4-5 times a week.     Medication Adherence/Access: presents to visit prescription bottles.  Patient uses pill box(es), sets up himself.  Patient takes medications 2 time(s) per day.   The patient fills medications at [place]: NO, fills medications at [facility].    Muscle pain: Visit on 8/31 recommended to continue with acetaminophen and heat. Instructed patient to avoid NSAID due to thrombocytopenia. Orders for PHYSICAL THERAPY. Patient tells me that PHYSICAL THERAPY has helped and he is pleased about this. He will continue to work with PHYSICAL THERAPY.     Type 2 Diabetes:  He is concerned about his metformin on 9/3 increased to 4 tablets, then on 9/9 he reduced himself back down to 2 tablets.   Currently taking Jardiance 10mg daily, metformin  mg, takes 1 tablet twice daily not always taking with food which was mentioned by wife and patient admits that is true.   Patient is not experiencing side effects.  Blood sugar monitoring: from his glucometer:    Eye exam: up to date  Foot exam: up to date  Aspirin: Taking 325mg daily and has the following issues: bruising, stomach upset and small black dots on skin note by heme/onc prior too.  Statin: No, see below.  ACEi/ARB: Yes: losartan-hydrochlorothiazide .   Urine Albumin:   Lab Results   Component Value Date    UMALCR 31.92 (H) 12/17/2019     Lab  Results   Component Value Date    A1C 7.0 07/30/2021    A1C 5.9 04/05/2021    A1C 6.7 01/15/2021    A1C 5.6 08/23/2020    A1C 5.5 07/14/2020    A1C 6.3 12/31/2019     AAA (repair in 2017)/Afib/HTN: he admits there were a few 3-4 days he was off of metoprolol waiting for refill. Today denies any CP, palpitation or fluttering. Recent cardioversion after CHEYANNE and thought possible ibrutinib induced afib. CT of coronary angiogram outpatient recommended at hospital discharged. Was on Eliquis for afib but held during hospital course due to thrombocytopenia.  Medications:  - Eliquis 5mg twice daily - recently started after heme/onc saw patient earlier this month  - metoprolol IR 50mg twice daily  - rosuvastatin 20mg daily   He has bruising but no active bleeding concerns.  Prior to his last 2 hospital visits he was on losartan 50-hydrochlorothiazide 12.5 mg in the morning.   Specialty:  Has a visit with cardiologty on 9/13 with Dr. Schaffer.  BP Readings from Last 3 Encounters:   09/10/21 131/76   09/02/21 (!) 160/86   08/31/21 (!) 140/80          Hyperlipidemia: Current therapy includes rosuvastatin 20 mg daily. Patient reports no significant myalgias or other side effects while on the atorvastatin.  Recent Labs   Lab Test 04/05/21  0913 01/15/21  1527 10/23/15  0903 10/23/15  0903 08/05/14  0755   CHOL 166 109   < > 109 131   HDL 57 51   < > 43 47   LDL 96 27   < > 54 63   TRIG 64 157*   < > 62 105   CHOLHDLRATIO  --   --   --  2.5 2.8    < > = values in this interval not displayed.     CLL: underwent bone marrow biopsy on 11/21/17.  Follows with heme/onc Dr. Breana Perry. Onc notes of ibrutinib started on 5/4/20. Discharged recommended to hold until Oncologist follow-up for further direction. Patient had a visit on 9/2/21 with oncology team and instructed to continue to hold therapy and follow-up on December with team.  He has received the booster for covid-19 vaccine.    Supplements: taking MV daily. Wondering if ok to  take.      Today's Vitals: /76   Pulse 52   Wt 205 lb (93 kg)   SpO2 98%   BMI 30.27 kg/m    ----------------  Post Discharge Medication Reconciliation Status: discharge medications reconciled and changed, per note/orders.    I spent 60 minutes with this patient today. All changes were made via collaborative practice agreement with Vanessa Munson MD. A copy of the visit note was provided to the patient's primary care provider.    The patient was given a summary of these recommendations.     Klaudia Santa, PharmD  Medication Therapy Management Pharmacist  Pager#: [phone]       Medication Therapy Recommendations  Type 2 diabetes mellitus with hypoglycemia without coma, without long-term current use of insulin (H)    Current Medication: insulin glargine (LANTUS PEN) 100 UNIT/ML pen   Rationale: More effective medication available - Ineffective medication - Effectiveness   Recommendation: Change Medication - stop Jardiance 10mg at this time to avoid DKA.   Status: Accepted per CPA

## 2021-09-11 LAB
ALBUMIN SERPL-MCNC: 3.1 G/DL (ref 3.4–5)
ALP SERPL-CCNC: 135 U/L (ref 40–150)
ALT SERPL W P-5'-P-CCNC: 98 U/L (ref 0–70)
ANION GAP SERPL CALCULATED.3IONS-SCNC: 6 MMOL/L (ref 3–14)
AST SERPL W P-5'-P-CCNC: 53 U/L (ref 0–45)
BILIRUB SERPL-MCNC: 0.8 MG/DL (ref 0.2–1.3)
BUN SERPL-MCNC: 32 MG/DL (ref 7–30)
CALCIUM SERPL-MCNC: 9 MG/DL (ref 8.5–10.1)
CHLORIDE BLD-SCNC: 100 MMOL/L (ref 94–109)
CO2 SERPL-SCNC: 25 MMOL/L (ref 20–32)
CREAT SERPL-MCNC: 1.62 MG/DL (ref 0.66–1.25)
GFR SERPL CREATININE-BSD FRML MDRD: 40 ML/MIN/1.73M2
GLUCOSE BLD-MCNC: 325 MG/DL (ref 70–99)
POTASSIUM BLD-SCNC: 4.4 MMOL/L (ref 3.4–5.3)
PROT SERPL-MCNC: 7.1 G/DL (ref 6.8–8.8)
SODIUM SERPL-SCNC: 131 MMOL/L (ref 133–144)

## 2021-09-12 ENCOUNTER — HEALTH MAINTENANCE LETTER (OUTPATIENT)
Age: 81
End: 2021-09-12

## 2021-09-13 ENCOUNTER — OFFICE VISIT (OUTPATIENT)
Dept: CARDIOLOGY | Facility: CLINIC | Age: 81
End: 2021-09-13
Attending: PHYSICIAN ASSISTANT
Payer: COMMERCIAL

## 2021-09-13 ENCOUNTER — HOSPITAL ENCOUNTER (OUTPATIENT)
Facility: CLINIC | Age: 81
End: 2021-09-13
Payer: COMMERCIAL

## 2021-09-13 ENCOUNTER — TELEPHONE (OUTPATIENT)
Dept: PEDIATRICS | Facility: CLINIC | Age: 81
End: 2021-09-13

## 2021-09-13 ENCOUNTER — HOSPITAL ENCOUNTER (OUTPATIENT)
Facility: CLINIC | Age: 81
End: 2021-09-13
Admitting: INTERNAL MEDICINE
Payer: COMMERCIAL

## 2021-09-13 VITALS
HEART RATE: 55 BPM | OXYGEN SATURATION: 99 % | HEIGHT: 69 IN | SYSTOLIC BLOOD PRESSURE: 170 MMHG | BODY MASS INDEX: 30.66 KG/M2 | WEIGHT: 207 LBS | DIASTOLIC BLOOD PRESSURE: 64 MMHG

## 2021-09-13 DIAGNOSIS — I48.91 ATRIAL FIBRILLATION, UNSPECIFIED TYPE (H): ICD-10-CM

## 2021-09-13 DIAGNOSIS — R79.82 ELEVATED C-REACTIVE PROTEIN (CRP): Primary | ICD-10-CM

## 2021-09-13 DIAGNOSIS — I42.9 CARDIOMYOPATHY, UNSPECIFIED TYPE (H): ICD-10-CM

## 2021-09-13 DIAGNOSIS — I71.40 ABDOMINAL AORTIC ANEURYSM (AAA) WITHOUT RUPTURE (H): Primary | ICD-10-CM

## 2021-09-13 DIAGNOSIS — R73.9 HYPERGLYCEMIA: ICD-10-CM

## 2021-09-13 PROCEDURE — 99215 OFFICE O/P EST HI 40 MIN: CPT | Performed by: INTERNAL MEDICINE

## 2021-09-13 RX ORDER — CLOPIDOGREL BISULFATE 75 MG/1
75 TABLET ORAL DAILY
Qty: 90 TABLET | Refills: 3 | Status: SHIPPED | OUTPATIENT
Start: 2021-09-13 | End: 2022-10-26

## 2021-09-13 RX ORDER — METOPROLOL SUCCINATE 50 MG/1
50 TABLET, EXTENDED RELEASE ORAL 2 TIMES DAILY
Qty: 90 TABLET | Refills: 3 | Status: SHIPPED | OUTPATIENT
Start: 2021-09-13 | End: 2021-09-23 | Stop reason: DRUGHIGH

## 2021-09-13 ASSESSMENT — MIFFLIN-ST. JEOR: SCORE: 1644.33

## 2021-09-13 NOTE — TELEPHONE ENCOUNTER
Patient given Shingrix vaccine at [facility].  See immunization flowsheet for details.     Scheduled    Blossom Wu

## 2021-09-13 NOTE — PROGRESS NOTES
"    Cardiology Clinic Progress Note:    September 13, 2021   Patient Name: Austin Garza  Patient MRN: [id number]     Consult indication: atrial fibrillation     HPI:    I had the opportunity to see patient Austin Garza in cardiology clinic for a follow up visit. Patient is followed by our colleague Vanessa Munson MD with Primary Care.     As you know, patient is a pleasant 81-year-old male with a past medical history significant for AAA status post repair, hypertension, diabetes, CLL (on Plavix and ibrutinib), recently diagnosed atrial fibrillation with RVR s/p CHEYANNE/cardioversion (8/2/2021), heart failure with reduced ejection fraction (LVEF 45 to 50% TTE 7/2021), who presents for follow-up.    I had initially met patient in hospital for a cardiology consultation on 7/30/2021.  At that time he had been experiencing intermittent chest discomfort, and was found to be in atrial fibrillation with RVR.  TTE demonstrated LVEF 45 to 50%, possible mild basal inferolateral, distal inferior hypokinesis.  Ultimately he underwent CHEYANNE/cardioversion 8/2/2021.  He was started on apixaban.  Clopidogrel was discontinued due to concern for bleeding risk.    He was subsequently seen by my colleague LUIZ Shahid on 8/18/2021.  At that time, he had been doing reasonably well, and no changes were warranted to his cardiac regimen.  He was admitted on 8/23/2021 with back pain, a CT aorta study did not demonstrate any acute aortic pathology.  A nuclear stress test was done on 8/24/2021 that was read as \"probably abnormal\", demonstrating a nontransmural infarction in the apical and inferior segments associated with a mild degree of arnoldo-infarct ischemia.  My colleague Dr. Russell was contacted by the hospitalist service given the patient's recent admission, and had recommended a coronary CTA.     Unfortunately, there was a misunderstanding and patient did not have this scheduled, nor a repeat limited echocardiogram " that was also ordered.    He reports that overall he feels quite fatigued, denies any abnormal weight gain, actually has lost weight unintentionally.  He denies any abnormal lower extremity swelling or symptoms of orthopnea/PND.  He denies any chest pain.  Weight today in clinic 207 pounds, weight 7/15/2021 216 pounds.  Blood pressure today in clinic was 170/64 mmHg, however 3 days ago in clinic was 131/76 mmHg.    Heart rate today in clinic was 55 bpm, however on physical exam heart rhythm was irregularly irregular, consistent with recurrence of atrial fibrillation.    Of note, patient was recently seen by Dr. Perry with Hematology/oncology, 9/2/2021, patient reports that following his discharge apixaban was held due to thrombocytopenia, and that on 9/2/2021 this was restarted.  He has continued to hold clopidogrel.      Followed by Dr. Cazares with Vascular surgery.    Assessment and Plan/Recommendations:    # Paroxysmal atrial fibrillation, [id number], on apixaban.  Status post CHEYANNE/DCCV 8/2/2021, however back in atrial fibrillation in clinic today.  Rate is controlled at 55 bpm, asymptomatic of palpitations, however patient notes worsening exertional capacity that is most likely related to atrial fibrillation.  # HFrEF LVEF 45 to 50%.  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  Does have significant risk factors for coronary artery disease.    # Presumed CAD.  CTT PE study 7/30/2021 and CT aorta study 8/23/2021 demonstrate significant coronary artery calcifications.  Nuclear stress test 8/24/2021 demonstrated nontransmural infarction in the apical and inferior segments with a mild degree of arnoldo-infarct ischemia.  Study was of low quality, artifact cannot be excluded.  Patient denies symptoms concerning for angina.  # HTN.  BP elevated, however was 131/76 mmHg 3 days ago.   # HL  # CLL, followed by Dr. Perry  # AAA s/p EVAR with Dr. Cazares   # DM2  # CKD    Discussed diagnoses of heart failure, atrial  fibrillation, hypertension, chronic kidney disease at length.  Discussed findings of nuclear stress test 8/24/2021.    Patient was initially planned for a coronary CTA as well as a repeat limited echocardiogram, however due to some misunderstanding, these have not been completed.  On review of his prior CT scans, due to significant coronary artery calcifications, a coronary CTA may be suboptimal.      Offered cardiac catheterization/coronary angiography, as previously this was not possible due to thrombocytopenia, however platelets have improved to 94,000 9/10/21.  He would still potentially be at risk for bleeding complications should he require dual antiplatelet therapy due to stenting, and develop thrombocytopenia later, also would be at risk for renal injury given baseline chronic kidney disease.  After an in-depth discussion with the patient and his wife, patient would like to hold off at this time, stating that he feels generally well.     Discussed cardiac MRI with stress test, which would help reassess LV function as well as evaluate for ischemia, since the nuclear stress test was of lower quality due to patient body habitus.  Patient would like to hold off for now, understanding that he likely has underlying coronary artery disease.    His symptoms of worsening fatigue and malaise do not seem to be representative of angina, suspect that this is most likely reflective of symptomatic atrial fibrillation.  He was restarted on apixaban 9/2/2021.  Rate is controlled in clinic today.  Discussed repeat CHEYANNE/DCCV, versus waiting for 3 weeks and proceeding with DCCV without CHEYANNE.  Patient would like to proceed with a DCCV after 3 weeks total of anticoagulation.    -Limited TTE in 2 weeks  -DCCV in 2 weeks (3 weeks total of apixaban started 9/2/2021, uninterrupted)  -We will check amiodarone monitoring labs in 2 weeks.  If DCCV is successful, will plan to start amiodarone p.o.  Discussed risks of amiodarone including  potential toxicity to eyes, thyroid, liver, lungs, skin.  Patient and wife voiced understanding and are in agreement with proceeding as planned.  He does have elevated LFTs, will need to monitor this.  -Change metoprolol tartrate 50 mg twice daily to metoprolol succinate 50 mg twice daily  -Continue rosuvastatin, apixaban  -Will ask our team RN Sandi to reach out to the patient in a few days to check blood pressure range at home, if consistently elevated, will start amlodipine 5 mg daily, uptitrated as needed.     -Since patient like to hold off on further ischemic evaluation, and he does likely have underlying coronary artery disease given significant risk factors, coronary artery calcifications on CT, and probably abnormal nuclear stress testing, discussed that if he develops any chest pain, chest pressure, abnormal shortness of breath, or other symptoms that are concerning for him, he should seek medical care.  Patient voiced understanding, and would still like to hold off for now.  -Reviewed note from Dr. Perry, patient is off ibrutinib and may be on a different CLL agent later, and so will restart clopidogrel 75 mg daily, and will reach out to Vascular surgeon Dr. Cazares regarding necessity of clopidogrel given concern for bleeding risk with ibrutinib and apixaban.  -Follow-up with LUIZ Shahid in 1 month, and with me in 3 months with a limited TTE at that time, or sooner as needed      Thank you for allowing our team to participate in the care of Austin Garza.  Please do not hesitate to call or page me with any questions or concerns.    Sincerely,     Raheel Schaffer MD, [facility]  Cardiology  Text Page   September 13, 2021    cc  Maria Isabel Raines PA-C  [facility]  [address] [place] [address] [address] [place]  [place] [id number]    Voice recognition software utilized. Although reviewed after completion, some word and grammatical errors may be present.    Total time spent on this encounter:  75 minutes, providing care in this encounter including, but not limited to, reviewing prior medical records, laboratory data, imaging studies, diagnostic studies, procedure notes, formulating an assessment and plan, recommendations.    Past Medical History:     Past Medical History:   Diagnosis Date     AAA (abdominal aortic aneurysm)      BCC (basal cell carcinoma of skin) - face      CLL (chronic lymphocytic leukemia)      Diaphragmatic hernia      Diverticulitis of colon      Esophageal reflux      Essential hypertension      Hyperlipidemia      Irritable bowel syndrome      Macular degeneration (senile) of retina      Squamous Cell Carcinoma, Back 1988     Type 2 diabetes mellitus         Past Surgical History:   Past Surgical History:   Procedure Laterality Date     ANESTHESIA CARDIOVERSION N/A 8/2/2021    Procedure: ANESTHESIA, FOR CARDIOVERSION;  Surgeon: GENERIC ANESTHESIA PROVIDER;  Location: [facility] OR     APPENDECTOMY OPEN  1966     BONE MARROW BIOPSY, BONE SPECIMEN, NEEDLE/TROCAR N/A 11/21/2017    Procedure: BIOPSY BONE MARROW;  BONE MARROW BIOPSY;  Surgeon: Shady Garcia MD;  Location:  GI     COLONOSCOPY  2002     ENDOVASCULAR REPAIR ANEURYSM ABDOMINAL AORTA N/A 12/4/2017    Procedure: ENDOVASCULAR REPAIR ANEURYSM ABDOMINAL AORTA;  ENDOVASCULAR REPAIR ANEURYSM ABDOMINAL AORTA;  Surgeon: Milton Cazares MD;  Location:  OR     Fistulotomy with marsupialization for repair of fisture in ano  2007     IR ABDOMINAL AORTOGRAM  3/11/2019     IR VISCERAL EMBOLIZATION  5/13/2019     Multiple skin tags - resection  1998     Squamous cell skin cancer resection - back  1985-90       Medications (outpatient):  Current Outpatient Medications   Medication Sig Dispense Refill     ACCU-CHEK GUIDE test strip Use to test blood sugar 2 times daily or as directed. 100 strip 1     apixaban ANTICOAGULANT (ELIQUIS) 5 MG tablet Take 5 mg twice daily. Please DO NOT take this medication until you are instructed to  restart this by your Oncologist. 60 tablet 0     blood glucose monitoring (NO BRAND SPECIFIED) meter device kit Use to test blood sugar 2 times daily or as directed. 1 kit 0     CONTOUR NEXT TEST test strip USE TO TEST BLOOD SUGAR 1-2 TIMES DAILY OR AS DIRECTED (ADJUSTING ORAL MEDICATIONS). 100 strip 1     desonide (DESOWEN) 0.05 % external ointment Apply topically 2 times daily To forehead x 2 weeks        insulin glargine (LANTUS PEN) 100 UNIT/ML pen Inject 16-30 Units Subcutaneous daily As directed 45 mL 0     insulin pen needle (BD SHRUTHI U/F) 32G X 4 MM miscellaneous Use 1 daily as directed. 200 each 5     metFORMIN (GLUCOPHAGE-XR) 500 MG 24 hr tablet Take 500 mg by mouth 2 times daily (with meals)       metoprolol tartrate (LOPRESSOR) 50 MG tablet Take 1 tablet (50 mg) by mouth 2 times daily 60 tablet 0     Multiple Vitamins-Minerals (CENTRUM SILVER) per tablet Take 1 tablet by mouth daily       rosuvastatin (CRESTOR) 20 MG tablet Take 1 tablet (20 mg) by mouth daily 90 tablet 3       Allergies:  Allergies   Allergen Reactions     Ace Inhibitors      cough       Social History:   History   Drug Use No      History   Smoking Status     Former Smoker     Packs/day: 0.50     Years: 20.00     Quit date: 1/1/1975   Smokeless Tobacco     Former User     Social History    Substance and Sexual Activity      Alcohol use: Yes        Alcohol/week: 10.0 - 14.0 standard drinks        Types: 10 - 14 Standard drinks or equivalent per week        Comment: 3 drinks a day/wine       Family History:  Family History   Problem Relation Age of Onset     Cerebrovascular Disease Father         x 2     Hypertension Mother      C.A.D. Mother      Cancer Mother         skin     Eye Disorder Mother         glaucoma     Prostate Cancer No family hx of      Cancer - colorectal No family hx of      Glaucoma No family hx of      Macular Degeneration No family hx of        Review of Systems:   A complete review of systems was negative except as  "mentioned in the History of Present Illness.     Objective & Physical Exam:  BP (!) 170/64 (BP Location: Right arm, Patient Position: Sitting, Cuff Size: Adult Regular)   Pulse 55   Ht 1.753 m (5' 9\")   Wt 93.9 kg (207 lb)   SpO2 99%   BMI 30.57 kg/m    Wt Readings from Last 2 Encounters:   09/13/21 93.9 kg (207 lb)   09/10/21 93 kg (205 lb)     Body mass index is 30.57 kg/m .   Body surface area is 2.14 meters squared.    Constitutional: appears stated age, in no apparent distress, appears to be well nourished  Eyes: sclera anicteric, conjunctiva normal  ENT: normocephalic, without obvious abnormality, atraumatic  Pulmonary: clear to auscultation bilaterally  Cardiovascular: JVP normal, regular rate, irregularly irregular rhythm, 1/6 CHEYENNE at the RUSB, no lower extremity edema  Gastrointestinal: abdominal exam benign  Neurologic: awake, alert, face symmetrical, moves all extremities  Skin: no jaundice  Psychiatric: affect is normal, answers questions appropriately, oriented to self and place    Data reviewed:  Lab Results   Component Value Date    WBC 8.8 09/10/2021    WBC 12.7 (H) 06/01/2021    RBC 3.50 (L) 09/10/2021    RBC 4.22 (L) 06/01/2021    HGB 10.9 (L) 09/10/2021    HGB 13.5 06/01/2021    HCT 33.2 (L) 09/10/2021    HCT 41.3 06/01/2021    MCV 95 09/10/2021    MCV 98 06/01/2021    MCH 31.1 09/10/2021    MCH 32.0 06/01/2021    MCHC 32.8 09/10/2021    MCHC 32.7 06/01/2021    RDW 14.4 09/10/2021    RDW 13.0 06/01/2021    PLT 94 (L) 09/10/2021     (L) 06/01/2021     Sodium   Date Value Ref Range Status   09/10/2021 131 (L) 133 - 144 mmol/L Final   06/01/2021 136 133 - 144 mmol/L Final     Potassium   Date Value Ref Range Status   09/10/2021 4.4 3.4 - 5.3 mmol/L Final   06/01/2021 4.2 3.4 - 5.3 mmol/L Final     Chloride   Date Value Ref Range Status   09/10/2021 100 94 - 109 mmol/L Final   06/01/2021 104 94 - 109 mmol/L Final     Carbon Dioxide   Date Value Ref Range Status   06/01/2021 30 20 - 32 " mmol/L Final     Carbon Dioxide (CO2)   Date Value Ref Range Status   09/10/2021 25 20 - 32 mmol/L Final     Anion Gap   Date Value Ref Range Status   09/10/2021 6 3 - 14 mmol/L Final   06/01/2021 2 (L) 3 - 14 mmol/L Final     Glucose   Date Value Ref Range Status   09/10/2021 325 (H) 70 - 99 mg/dL Final   06/01/2021 215 (H) 70 - 99 mg/dL Final     Urea Nitrogen   Date Value Ref Range Status   09/10/2021 32 (H) 7 - 30 mg/dL Final   06/01/2021 26 7 - 30 mg/dL Final     Creatinine   Date Value Ref Range Status   09/10/2021 1.62 (H) 0.66 - 1.25 mg/dL Final   06/01/2021 1.51 (H) 0.66 - 1.25 mg/dL Final     GFR Estimate   Date Value Ref Range Status   09/10/2021 40 (L) >60 mL/min/1.73m2 Final     Comment:     As of July 11, 2021, eGFR is calculated by the CKD-EPI creatinine equation, without race adjustment. eGFR can be influenced by muscle mass, exercise, and diet. The reported eGFR is an estimation only and is only applicable if the renal function is stable.   06/01/2021 43 (L) >60 mL/min/[1.73_m2] Final     Comment:     Non  GFR Calc  Starting 12/18/2018, serum creatinine based estimated GFR (eGFR) will be   calculated using the Chronic Kidney Disease Epidemiology Collaboration   (CKD-EPI) equation.       Calcium   Date Value Ref Range Status   09/10/2021 9.0 8.5 - 10.1 mg/dL Final   06/01/2021 8.9 8.5 - 10.1 mg/dL Final     Bilirubin Total   Date Value Ref Range Status   09/10/2021 0.8 0.2 - 1.3 mg/dL Final   06/01/2021 0.7 0.2 - 1.3 mg/dL Final     Alkaline Phosphatase   Date Value Ref Range Status   09/10/2021 135 40 - 150 U/L Final   06/01/2021 58 40 - 150 U/L Final     ALT   Date Value Ref Range Status   09/10/2021 98 (H) 0 - 70 U/L Final   06/01/2021 21 0 - 70 U/L Final     AST   Date Value Ref Range Status   09/10/2021 53 (H) 0 - 45 U/L Final   06/01/2021 15 0 - 45 U/L Final     Recent Labs   Lab Test 04/05/21  0913 01/15/21  1527 10/23/15  0903 08/05/14  0755   CHOL 166 109 109 131   HDL 57 51  43 47   LDL 96 27 54 63   TRIG 64 157* 62 105   CHOLHDLRATIO  --   --  2.5 2.8      Lab Results   Component Value Date    A1C 7.0 2021    A1C 5.9 2021    A1C 6.7 01/15/2021    A1C 5.6 2020    A1C 5.5 2020    A1C 6.3 2019        Recent Results (from the past 4320 hour(s))   Transesophageal Echocardiogram   Result Value    LVEF  40-45%    [facility]    [id number]  [facility]  [id number]  [id number]^MICHAEL^CEDRIC     [facility]  Echocardiography Laboratory  [address] [place] [address]     Name: MARK MORA  MRN: [id number]  : 1940  Study Date: 2021 08:03 AM  Age: 81 yrs  Gender: Male  Patient Location: [facility]  Reason For Study: Afib  Ordering Physician: CEDRIC RODRIGUEZ  Performed By: Crissy Leung     BSA: 2.1 m2  Height: 69 in  Weight: 208 lb  HR: 153  BP: 105/74 mmHg  ______________________________________________________________________________  Procedure  Complete CHEYANNE Adult. CHEYANNE Probe serial #[id number] (R) was used during the  procedure. The heart rate, respiratory rate and response to care were  monitored throughout the procedure with the assistance of the nurse.  ______________________________________________________________________________  Interpretation Summary     The visual ejection fraction is 40-45%.  The rhythm was rapid atrial fibrillation.  No thrombus is detected in the left atrial appendage. Normal mechanical  function.  ______________________________________________________________________________  CHEYANNE  Versed (2mg) was given intravenously. Fentanyl (50mcg) was given  intravenously. I determined this patient to be an appropriate candidate for  the planned sedation and procedure and have reassessed the patient immediately  prior to sedation and procedure. Total sedation time: 8 minutes of continuous  bedside 1:1 monitoring. There were no complications associated with this  procedure. The transesophageal probe was passed without  difficulty.     Left Ventricle  The left ventricle is normal in size. There is normal left ventricular wall  thickness. The visual ejection fraction is 40-45%. There is mild global  hypokinesia of the left ventricle.     Right Ventricle  The right ventricle is normal size. Right ventricular function cannot be  assessed due to poor image quality.     Atria  A contrast injection (Bubble Study) was performed that was negative for flow  across the interatrial septum. No thrombus is detected in the left atrial  appendage.     Mitral Valve  The mitral valve leaflets appear normal. There is no evidence of stenosis,  fluttering, or prolapse. There is mild (1+) mitral regurgitation.     Tricuspid Valve  Normal tricuspid valve.     Aortic Valve  There is trivial trileaflet aortic sclerosis. There is trace to mild aortic  regurgitation.     Pulmonic Valve  The pulmonic valve is not well seen, but is grossly normal.     Vessels  The aortic root is normal size. Mild atherosclerotic plaque(s) in the aortic  arch.     Pericardial/Pleural  There is no pericardial effusion.     Rhythm  The rhythm was rapid atrial fibrillation.  ______________________________________________________________________________  Report approved by: Sharonda Hurd 2021 11:18 AM     ______________________________________________________________________________      Echocardiogram Complete   Result Value    LVEF  45-50%    Narrative    [id number]  [id number]  [id number]  [id number]^PATRICIA^SURAJ     [facility]  Echocardiography Laboratory  [address] [place] [address]     Name: MARK MORA  MRN: [id number]  : 1940  Study Date: 2021 08:53 AM  Age: 81 yrs  Gender: Male  Patient Location: [facility]  Reason For Study: Atrial Fibrillation  Ordering Physician: KULWANT GOMEZ  Referring Physician: Vanessa Munson  Performed By: Radha Spain     BSA: 2.1 m2  Height: 69 in  Weight: 212 lb  HR: 109  BP: 151/91  mmHg  ______________________________________________________________________________  Procedure  Complete Portable Echo Adult.  ______________________________________________________________________________  Interpretation Summary     The visual ejection fraction is 45-50%.  Left ventricular systolic function is mildly reduced.  With rapid atrial fibrillation, the visual approximation of left ventricular  systolic function may be falsely decreased.  There are regional wall motion abnormalities as specified.  Contrast would increase the accuracy of regional wall motion analysis.  Mild aortic root dilatation.  The ascending aorta is Mildly dilated.  The aortic arch is Mildly dilated.  The right ventricle was not well seen in the apical acoustic window. In the  subcostal views, the right ventricular systolic function appears to be mildly  reduced.  There is trace aortic regurgitation.  The rhythm was rapid atrial fibrillation.  The study was technically difficult.  ______________________________________________________________________________  Left Ventricle  The left ventricle is normal in size. There is mild concentric left  ventricular hypertrophy. A sigmoid septum is present. Diastolic function not  assessed due to atrial fibrillation. With rapid atrial fibrillation, the  visual approximation of left ventricular systolic function may be falsely  decreased. The visual ejection fraction is 45-50%. Left ventricular systolic  function is mildly reduced. In some views the mid to basal inferolateral wall  and the distal inferior wall appear mildly to moderately hypokinetic. There  are regional wall motion abnormalities as specified.     Right Ventricle  The right ventricle is normal size. Right ventricular function cannot be  assessed due to poor image quality. The right ventricle was not well seen in  the apical acoustic window. In the subcostal views, the right ventricular  systolic function appears to be mildly  reduced.     Atria  Normal left atrial size. Right atrial size is normal. There is no color  Doppler evidence of an atrial shunt.     Mitral Valve  There is mild mitral annular calcification. There is trace mitral  regurgitation.     Tricuspid Valve  There is trace tricuspid regurgitation. Right ventricular systolic pressure  could not be approximated due to inadequate tricuspid regurgitation.     Aortic Valve  The aortic valve is trileaflet. There is moderate trileaflet aortic sclerosis.  There is trace aortic regurgitation. No hemodynamically significant valvular  aortic stenosis.     Pulmonic Valve  There is no pulmonic valvular regurgitation. Normal pulmonic valve velocity.     Vessels  Mild aortic root dilatation. The ascending aorta is Mildly dilated. The aortic  arch is Mildly dilated. IVC diameter and respiratory changes fall into an  intermediate range suggesting an RA pressure of 8 mmHg.     Pericardium  There is no pericardial effusion.     Rhythm  The rhythm was rapid atrial fibrillation.  ______________________________________________________________________________  MMode/2D Measurements & Calculations  IVSd: 1.8 cm     LVIDd: 3.7 cm  LVIDs: 3.1 cm  LVPWd: 1.2 cm  FS: 15.3 %  LV mass(C)d: 213.0 grams  LV mass(C)dI: 100.6 grams/m2  Ao root diam: 4.1 cm  LA dimension: 3.6 cm  asc Aorta Diam: 4.3 cm  Ao Arch Diam (Proximal trans.): 3.8 cm  LA/Ao: 0.87  LVOT diam: 2.3 cm  LVOT area: 4.3 cm2  LA Volume (BP): 54.4 ml  LA Volume Index (BP): 25.7 ml/m2  RWT: 0.66     Doppler Measurements & Calculations  LV V1 max PG: 3.1 mmHg  LV V1 max: 86.8 cm/sec  LV V1 VTI: 12.2 cm  SV(LVOT): 52.6 ml  SI(LVOT): 24.8 ml/m2  PA acc time: 0.10 sec     ______________________________________________________________________________  Report approved by: Sharonda Humphreys 07/30/2021 09:40 AM

## 2021-09-13 NOTE — TELEPHONE ENCOUNTER
LATOYA to schedule a lab in two weeks, 9/27/21, per Dr. Munson.    Ita Oneal on 9/13/2021 at 2:18 PM

## 2021-09-13 NOTE — PATIENT INSTRUCTIONS
September 13, 2021    Thank you for allowing our Cardiology team to participate in your care.     Please note the following changes to your heart treatment plan:     Medication changes:   - change metoprolol tartrate 50mg twice daily to metoprolol succinate 50mg twice daily    - after cardioversion, start amiodarone    Tests to be done:  - TTE (heart ultrasound) in 2 weeks  - cardioversion in 2 weeks  - NON-fasting labs in 2 weeks    - TTE in 3 months    Follow up:  - Follow up in 1 month with Maria Isabel DEE and with me in about 2-3 months, or sooner as needed.      Please contact our team at [phone] or [phone] for any questions or concerns.   For scheduling, please call [phone].  If you are having a medical emergency, please call [phone].     Sincerely,    Raheel Schaffer MD, [facility]  Cardiology    [facility] - [facility] - [facility] - [place]

## 2021-09-13 NOTE — LETTER
"9/13/2021    Vanessa Munson MD  [address] [facility] [address]    RE: Austin Garza       Dear Colleague,    I had the pleasure of seeing Austin Garza in the [facility].        Cardiology Clinic Progress Note:    September 13, 2021   Patient Name: Austin Garza  Patient MRN: [id number]     Consult indication: atrial fibrillation     HPI:    I had the opportunity to see patient Austin Garza in cardiology clinic for a follow up visit. Patient is followed by our colleague Vanessa Munson MD with Primary Care.     As you know, patient is a pleasant 81-year-old male with a past medical history significant for AAA status post repair, hypertension, diabetes, CLL (on Plavix and ibrutinib), recently diagnosed atrial fibrillation with RVR s/p CHEYANNE/cardioversion (8/2/2021), heart failure with reduced ejection fraction (LVEF 45 to 50% TTE 7/2021), who presents for follow-up.    I had initially met patient in hospital for a cardiology consultation on 7/30/2021.  At that time he had been experiencing intermittent chest discomfort, and was found to be in atrial fibrillation with RVR.  TTE demonstrated LVEF 45 to 50%, possible mild basal inferolateral, distal inferior hypokinesis.  Ultimately he underwent CHEYANNE/cardioversion 8/2/2021.  He was started on apixaban.  Clopidogrel was discontinued due to concern for bleeding risk.    He was subsequently seen by my colleague LUIZ Shahid on 8/18/2021.  At that time, he had been doing reasonably well, and no changes were warranted to his cardiac regimen.  He was admitted on 8/23/2021 with back pain, a CT aorta study did not demonstrate any acute aortic pathology.  A nuclear stress test was done on 8/24/2021 that was read as \"probably abnormal\", demonstrating a nontransmural infarction in the apical and inferior segments associated with a mild degree of arnoldo-infarct ischemia.  My colleague  " Drew was contacted by the hospitalist service given the patient's recent admission, and had recommended a coronary CTA.     Unfortunately, there was a misunderstanding and patient did not have this scheduled, nor a repeat limited echocardiogram that was also ordered.    He reports that overall he feels quite fatigued, denies any abnormal weight gain, actually has lost weight unintentionally.  He denies any abnormal lower extremity swelling or symptoms of orthopnea/PND.  He denies any chest pain.  Weight today in clinic 207 pounds, weight 7/15/2021 216 pounds.  Blood pressure today in clinic was 170/64 mmHg, however 3 days ago in clinic was 131/76 mmHg.    Heart rate today in clinic was 55 bpm, however on physical exam heart rhythm was irregularly irregular, consistent with recurrence of atrial fibrillation.    Of note, patient was recently seen by Dr. Perry with Hematology/oncology, 9/2/2021, patient reports that following his discharge apixaban was held due to thrombocytopenia, and that on 9/2/2021 this was restarted.  He has continued to hold clopidogrel.      Followed by Dr. Cazares with Vascular surgery.    Assessment and Plan/Recommendations:    # Paroxysmal atrial fibrillation, [id number], on apixaban.  Status post CHEYANNE/DCCV 8/2/2021, however back in atrial fibrillation in clinic today.  Rate is controlled at 55 bpm, asymptomatic of palpitations, however patient notes worsening exertional capacity that is most likely related to atrial fibrillation.  # HFrEF LVEF 45 to 50%.  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  Does have significant risk factors for coronary artery disease.    # Presumed CAD.  CTT PE study 7/30/2021 and CT aorta study 8/23/2021 demonstrate significant coronary artery calcifications.  Nuclear stress test 8/24/2021 demonstrated nontransmural infarction in the apical and inferior segments with a mild degree of arnoldo-infarct ischemia.  Study was of low quality, artifact cannot be  excluded.  Patient denies symptoms concerning for angina.  # HTN.  BP elevated, however was 131/76 mmHg 3 days ago.   # HL  # CLL, followed by Dr. Perry  # AAA s/p EVAR with Dr. Cazares   # DM2  # CKD    Discussed diagnoses of heart failure, atrial fibrillation, hypertension, chronic kidney disease at length.  Discussed findings of nuclear stress test 8/24/2021.    Patient was initially planned for a coronary CTA as well as a repeat limited echocardiogram, however due to some misunderstanding, these have not been completed.  On review of his prior CT scans, due to significant coronary artery calcifications, a coronary CTA may be suboptimal.      Offered cardiac catheterization/coronary angiography, as previously this was not possible due to thrombocytopenia, however platelets have improved to 94,000 9/10/21.  He would still potentially be at risk for bleeding complications should he require dual antiplatelet therapy due to stenting, and develop thrombocytopenia later, also would be at risk for renal injury given baseline chronic kidney disease.  After an in-depth discussion with the patient and his wife, patient would like to hold off at this time, stating that he feels generally well.     Discussed cardiac MRI with stress test, which would help reassess LV function as well as evaluate for ischemia, since the nuclear stress test was of lower quality due to patient body habitus.  Patient would like to hold off for now, understanding that he likely has underlying coronary artery disease.    His symptoms of worsening fatigue and malaise do not seem to be representative of angina, suspect that this is most likely reflective of symptomatic atrial fibrillation.  He was restarted on apixaban 9/2/2021.  Rate is controlled in clinic today.  Discussed repeat CHEYANNE/DCCV, versus waiting for 3 weeks and proceeding with DCCV without CHEYANNE.  Patient would like to proceed with a DCCV after 3 weeks total of anticoagulation.    -Limited  TTE in 2 weeks  -DCCV in 2 weeks (3 weeks total of apixaban started 9/2/2021, uninterrupted)  -We will check amiodarone monitoring labs in 2 weeks.  If DCCV is successful, will plan to start amiodarone p.o.  Discussed risks of amiodarone including potential toxicity to eyes, thyroid, liver, lungs, skin.  Patient and wife voiced understanding and are in agreement with proceeding as planned.  He does have elevated LFTs, will need to monitor this.  -Change metoprolol tartrate 50 mg twice daily to metoprolol succinate 50 mg twice daily  -Continue rosuvastatin, apixaban  -Will ask our team RN Sandi to reach out to the patient in a few days to check blood pressure range at home, if consistently elevated, will start amlodipine 5 mg daily, uptitrated as needed.     -Since patient like to hold off on further ischemic evaluation, and he does likely have underlying coronary artery disease given significant risk factors, coronary artery calcifications on CT, and probably abnormal nuclear stress testing, discussed that if he develops any chest pain, chest pressure, abnormal shortness of breath, or other symptoms that are concerning for him, he should seek medical care.  Patient voiced understanding, and would still like to hold off for now.  -Reviewed note from Dr. Perry, patient is off ibrutinib and may be on a different CLL agent later, and so will restart clopidogrel 75 mg daily, and will reach out to Vascular surgeon Dr. Cazares regarding necessity of clopidogrel given concern for bleeding risk with ibrutinib and apixaban.  -Follow-up with LUIZ Shahid in 1 month, and with me in 3 months with a limited TTE at that time, or sooner as needed      Thank you for allowing our team to participate in the care of Austin Garza.  Please do not hesitate to call or page me with any questions or concerns.    Sincerely,     Raheel Schaffer MD, [facility]  Cardiology  Text Page   September 13, 2021    cc  Maria Isabel BRUMFIELD  JESSICA Raines  [facility]  [address] [place] [address]    Voice recognition software utilized. Although reviewed after completion, some word and grammatical errors may be present.    Total time spent on this encounter: 75 minutes, providing care in this encounter including, but not limited to, reviewing prior medical records, laboratory data, imaging studies, diagnostic studies, procedure notes, formulating an assessment and plan, recommendations.    Past Medical History:     Past Medical History:   Diagnosis Date     AAA (abdominal aortic aneurysm)      BCC (basal cell carcinoma of skin) - face      CLL (chronic lymphocytic leukemia)      Diaphragmatic hernia      Diverticulitis of colon      Esophageal reflux      Essential hypertension      Hyperlipidemia      Irritable bowel syndrome      Macular degeneration (senile) of retina      Squamous Cell Carcinoma, Back 1988     Type 2 diabetes mellitus         Past Surgical History:   Past Surgical History:   Procedure Laterality Date     ANESTHESIA CARDIOVERSION N/A 8/2/2021    Procedure: ANESTHESIA, FOR CARDIOVERSION;  Surgeon: GENERIC ANESTHESIA PROVIDER;  Location: [facility] OR     APPENDECTOMY OPEN  1966     BONE MARROW BIOPSY, BONE SPECIMEN, NEEDLE/TROCAR N/A 11/21/2017    Procedure: BIOPSY BONE MARROW;  BONE MARROW BIOPSY;  Surgeon: Shady Garcia MD;  Location:  GI     COLONOSCOPY  2002     ENDOVASCULAR REPAIR ANEURYSM ABDOMINAL AORTA N/A 12/4/2017    Procedure: ENDOVASCULAR REPAIR ANEURYSM ABDOMINAL AORTA;  ENDOVASCULAR REPAIR ANEURYSM ABDOMINAL AORTA;  Surgeon: Milton Cazares MD;  Location:  OR     Fistulotomy with marsupialization for repair of fisture in ano  2007     IR ABDOMINAL AORTOGRAM  3/11/2019     IR VISCERAL EMBOLIZATION  5/13/2019     Multiple skin tags - resection  1998     Squamous cell skin cancer resection - back  1985-90       Medications (outpatient):  Current Outpatient Medications   Medication  Sig Dispense Refill     ACCU-CHEK GUIDE test strip Use to test blood sugar 2 times daily or as directed. 100 strip 1     apixaban ANTICOAGULANT (ELIQUIS) 5 MG tablet Take 5 mg twice daily. Please DO NOT take this medication until you are instructed to restart this by your Oncologist. 60 tablet 0     blood glucose monitoring (NO BRAND SPECIFIED) meter device kit Use to test blood sugar 2 times daily or as directed. 1 kit 0     CONTOUR NEXT TEST test strip USE TO TEST BLOOD SUGAR 1-2 TIMES DAILY OR AS DIRECTED (ADJUSTING ORAL MEDICATIONS). 100 strip 1     desonide (DESOWEN) 0.05 % external ointment Apply topically 2 times daily To forehead x 2 weeks        insulin glargine (LANTUS PEN) 100 UNIT/ML pen Inject 16-30 Units Subcutaneous daily As directed 45 mL 0     insulin pen needle (BD SHRUTHI U/F) 32G X 4 MM miscellaneous Use 1 daily as directed. 200 each 5     metFORMIN (GLUCOPHAGE-XR) 500 MG 24 hr tablet Take 500 mg by mouth 2 times daily (with meals)       metoprolol tartrate (LOPRESSOR) 50 MG tablet Take 1 tablet (50 mg) by mouth 2 times daily 60 tablet 0     Multiple Vitamins-Minerals (CENTRUM SILVER) per tablet Take 1 tablet by mouth daily       rosuvastatin (CRESTOR) 20 MG tablet Take 1 tablet (20 mg) by mouth daily 90 tablet 3       Allergies:  Allergies   Allergen Reactions     Ace Inhibitors      cough       Social History:   History   Drug Use No      History   Smoking Status     Former Smoker     Packs/day: 0.50     Years: 20.00     Quit date: 1/1/1975   Smokeless Tobacco     Former User     Social History    Substance and Sexual Activity      Alcohol use: Yes        Alcohol/week: 10.0 - 14.0 standard drinks        Types: 10 - 14 Standard drinks or equivalent per week        Comment: 3 drinks a day/wine       Family History:  Family History   Problem Relation Age of Onset     Cerebrovascular Disease Father         x 2     Hypertension Mother      C.A.D. Mother      Cancer Mother         skin     Eye Disorder  "Mother         glaucoma     Prostate Cancer No family hx of      Cancer - colorectal No family hx of      Glaucoma No family hx of      Macular Degeneration No family hx of        Review of Systems:   A complete review of systems was negative except as mentioned in the History of Present Illness.     Objective & Physical Exam:  BP (!) 170/64 (BP Location: Right arm, Patient Position: Sitting, Cuff Size: Adult Regular)   Pulse 55   Ht 1.753 m (5' 9\")   Wt 93.9 kg (207 lb)   SpO2 99%   BMI 30.57 kg/m    Wt Readings from Last 2 Encounters:   09/13/21 93.9 kg (207 lb)   09/10/21 93 kg (205 lb)     Body mass index is 30.57 kg/m .   Body surface area is 2.14 meters squared.    Constitutional: appears stated age, in no apparent distress, appears to be well nourished  Eyes: sclera anicteric, conjunctiva normal  ENT: normocephalic, without obvious abnormality, atraumatic  Pulmonary: clear to auscultation bilaterally  Cardiovascular: JVP normal, regular rate, irregularly irregular rhythm, 1/6 CHEYENNE at the RUSB, no lower extremity edema  Gastrointestinal: abdominal exam benign  Neurologic: awake, alert, face symmetrical, moves all extremities  Skin: no jaundice  Psychiatric: affect is normal, answers questions appropriately, oriented to self and place    Data reviewed:  Lab Results   Component Value Date    WBC 8.8 09/10/2021    WBC 12.7 (H) 06/01/2021    RBC 3.50 (L) 09/10/2021    RBC 4.22 (L) 06/01/2021    HGB 10.9 (L) 09/10/2021    HGB 13.5 06/01/2021    HCT 33.2 (L) 09/10/2021    HCT 41.3 06/01/2021    MCV 95 09/10/2021    MCV 98 06/01/2021    MCH 31.1 09/10/2021    MCH 32.0 06/01/2021    MCHC 32.8 09/10/2021    MCHC 32.7 06/01/2021    RDW 14.4 09/10/2021    RDW 13.0 06/01/2021    PLT 94 (L) 09/10/2021     (L) 06/01/2021     Sodium   Date Value Ref Range Status   09/10/2021 131 (L) 133 - 144 mmol/L Final   06/01/2021 136 133 - 144 mmol/L Final     Potassium   Date Value Ref Range Status   09/10/2021 4.4 3.4 - 5.3 " mmol/L Final   06/01/2021 4.2 3.4 - 5.3 mmol/L Final     Chloride   Date Value Ref Range Status   09/10/2021 100 94 - 109 mmol/L Final   06/01/2021 104 94 - 109 mmol/L Final     Carbon Dioxide   Date Value Ref Range Status   06/01/2021 30 20 - 32 mmol/L Final     Carbon Dioxide (CO2)   Date Value Ref Range Status   09/10/2021 25 20 - 32 mmol/L Final     Anion Gap   Date Value Ref Range Status   09/10/2021 6 3 - 14 mmol/L Final   06/01/2021 2 (L) 3 - 14 mmol/L Final     Glucose   Date Value Ref Range Status   09/10/2021 325 (H) 70 - 99 mg/dL Final   06/01/2021 215 (H) 70 - 99 mg/dL Final     Urea Nitrogen   Date Value Ref Range Status   09/10/2021 32 (H) 7 - 30 mg/dL Final   06/01/2021 26 7 - 30 mg/dL Final     Creatinine   Date Value Ref Range Status   09/10/2021 1.62 (H) 0.66 - 1.25 mg/dL Final   06/01/2021 1.51 (H) 0.66 - 1.25 mg/dL Final     GFR Estimate   Date Value Ref Range Status   09/10/2021 40 (L) >60 mL/min/1.73m2 Final     Comment:     As of July 11, 2021, eGFR is calculated by the CKD-EPI creatinine equation, without race adjustment. eGFR can be influenced by muscle mass, exercise, and diet. The reported eGFR is an estimation only and is only applicable if the renal function is stable.   06/01/2021 43 (L) >60 mL/min/[1.73_m2] Final     Comment:     Non  GFR Calc  Starting 12/18/2018, serum creatinine based estimated GFR (eGFR) will be   calculated using the Chronic Kidney Disease Epidemiology Collaboration   (CKD-EPI) equation.       Calcium   Date Value Ref Range Status   09/10/2021 9.0 8.5 - 10.1 mg/dL Final   06/01/2021 8.9 8.5 - 10.1 mg/dL Final     Bilirubin Total   Date Value Ref Range Status   09/10/2021 0.8 0.2 - 1.3 mg/dL Final   06/01/2021 0.7 0.2 - 1.3 mg/dL Final     Alkaline Phosphatase   Date Value Ref Range Status   09/10/2021 135 40 - 150 U/L Final   06/01/2021 58 40 - 150 U/L Final     ALT   Date Value Ref Range Status   09/10/2021 98 (H) 0 - 70 U/L Final   06/01/2021 21  0 - 70 U/L Final     AST   Date Value Ref Range Status   09/10/2021 53 (H) 0 - 45 U/L Final   2021 15 0 - 45 U/L Final     Recent Labs   Lab Test 21  0913 01/15/21  1527 10/23/15  0903 14  0755   CHOL 166 109 109 131   HDL 57 51 43 47   LDL 96 27 54 63   TRIG 64 157* 62 105   CHOLHDLRATIO  --   --  2.5 2.8      Lab Results   Component Value Date    A1C 7.0 2021    A1C 5.9 2021    A1C 6.7 01/15/2021    A1C 5.6 2020    A1C 5.5 2020    A1C 6.3 2019        Recent Results (from the past 4320 hour(s))   Transesophageal Echocardiogram   Result Value    LVEF  40-45%    [facility]    [id number]  [id number]  [id number]  [id number]^MICHAEL^CEDRIC     [facility]  Echocardiography Laboratory  [address] [place] [address]     Name: MARK MORA  MRN: [id number]  : 1940  Study Date: 2021 08:03 AM  Age: 81 yrs  Gender: Male  Patient Location: [facility]  Reason For Study: Afib  Ordering Physician: CEDRIC RODRIGUEZ  Performed By: Crissy Leung     BSA: 2.1 m2  Height: 69 in  Weight: 208 lb  HR: 153  BP: 105/74 mmHg  ______________________________________________________________________________  Procedure  Complete CHEYANNE Adult. CHEYANNE Probe serial #[id number] (R) was used during the  procedure. The heart rate, respiratory rate and response to care were  monitored throughout the procedure with the assistance of the nurse.  ______________________________________________________________________________  Interpretation Summary     The visual ejection fraction is 40-45%.  The rhythm was rapid atrial fibrillation.  No thrombus is detected in the left atrial appendage. Normal mechanical  function.  ______________________________________________________________________________  CHEYANNE  Versed (2mg) was given intravenously. Fentanyl (50mcg) was given  intravenously. I determined this patient to be an appropriate candidate for  the planned sedation and procedure and have  reassessed the patient immediately  prior to sedation and procedure. Total sedation time: 8 minutes of continuous  bedside 1:1 monitoring. There were no complications associated with this  procedure. The transesophageal probe was passed without difficulty.     Left Ventricle  The left ventricle is normal in size. There is normal left ventricular wall  thickness. The visual ejection fraction is 40-45%. There is mild global  hypokinesia of the left ventricle.     Right Ventricle  The right ventricle is normal size. Right ventricular function cannot be  assessed due to poor image quality.     Atria  A contrast injection (Bubble Study) was performed that was negative for flow  across the interatrial septum. No thrombus is detected in the left atrial  appendage.     Mitral Valve  The mitral valve leaflets appear normal. There is no evidence of stenosis,  fluttering, or prolapse. There is mild (1+) mitral regurgitation.     Tricuspid Valve  Normal tricuspid valve.     Aortic Valve  There is trivial trileaflet aortic sclerosis. There is trace to mild aortic  regurgitation.     Pulmonic Valve  The pulmonic valve is not well seen, but is grossly normal.     Vessels  The aortic root is normal size. Mild atherosclerotic plaque(s) in the aortic  arch.     Pericardial/Pleural  There is no pericardial effusion.     Rhythm  The rhythm was rapid atrial fibrillation.  ______________________________________________________________________________  Report approved by: Sharonda Hurd 2021 11:18 AM     ______________________________________________________________________________      Echocardiogram Complete   Result Value    LVEF  45-50%    Narrative    [id number]  [id number]  [id number]  [id number]^PATRICIA^KULWANT^LESLIE     [facility]  Echocardiography Laboratory  [address] [place] [address]     Name: MORGAN MARK A  MRN: [id number]  : 1940  Study Date: 2021 08:53 AM  Age: 81 yrs  Gender:  Male  Patient Location: [facility]  Reason For Study: Atrial Fibrillation  Ordering Physician: KULWANT GOMEZ  Referring Physician: Vanessa Munson  Performed By: Radha Spain     BSA: 2.1 m2  Height: 69 in  Weight: 212 lb  HR: 109  BP: 151/91 mmHg  ______________________________________________________________________________  Procedure  Complete Portable Echo Adult.  ______________________________________________________________________________  Interpretation Summary     The visual ejection fraction is 45-50%.  Left ventricular systolic function is mildly reduced.  With rapid atrial fibrillation, the visual approximation of left ventricular  systolic function may be falsely decreased.  There are regional wall motion abnormalities as specified.  Contrast would increase the accuracy of regional wall motion analysis.  Mild aortic root dilatation.  The ascending aorta is Mildly dilated.  The aortic arch is Mildly dilated.  The right ventricle was not well seen in the apical acoustic window. In the  subcostal views, the right ventricular systolic function appears to be mildly  reduced.  There is trace aortic regurgitation.  The rhythm was rapid atrial fibrillation.  The study was technically difficult.  ______________________________________________________________________________  Left Ventricle  The left ventricle is normal in size. There is mild concentric left  ventricular hypertrophy. A sigmoid septum is present. Diastolic function not  assessed due to atrial fibrillation. With rapid atrial fibrillation, the  visual approximation of left ventricular systolic function may be falsely  decreased. The visual ejection fraction is 45-50%. Left ventricular systolic  function is mildly reduced. In some views the mid to basal inferolateral wall  and the distal inferior wall appear mildly to moderately hypokinetic. There  are regional wall motion abnormalities as specified.     Right Ventricle  The right ventricle is normal  size. Right ventricular function cannot be  assessed due to poor image quality. The right ventricle was not well seen in  the apical acoustic window. In the subcostal views, the right ventricular  systolic function appears to be mildly reduced.     Atria  Normal left atrial size. Right atrial size is normal. There is no color  Doppler evidence of an atrial shunt.     Mitral Valve  There is mild mitral annular calcification. There is trace mitral  regurgitation.     Tricuspid Valve  There is trace tricuspid regurgitation. Right ventricular systolic pressure  could not be approximated due to inadequate tricuspid regurgitation.     Aortic Valve  The aortic valve is trileaflet. There is moderate trileaflet aortic sclerosis.  There is trace aortic regurgitation. No hemodynamically significant valvular  aortic stenosis.     Pulmonic Valve  There is no pulmonic valvular regurgitation. Normal pulmonic valve velocity.     Vessels  Mild aortic root dilatation. The ascending aorta is Mildly dilated. The aortic  arch is Mildly dilated. IVC diameter and respiratory changes fall into an  intermediate range suggesting an RA pressure of 8 mmHg.     Pericardium  There is no pericardial effusion.     Rhythm  The rhythm was rapid atrial fibrillation.  ______________________________________________________________________________  MMode/2D Measurements & Calculations  IVSd: 1.8 cm     LVIDd: 3.7 cm  LVIDs: 3.1 cm  LVPWd: 1.2 cm  FS: 15.3 %  LV mass(C)d: 213.0 grams  LV mass(C)dI: 100.6 grams/m2  Ao root diam: 4.1 cm  LA dimension: 3.6 cm  asc Aorta Diam: 4.3 cm  Ao Arch Diam (Proximal trans.): 3.8 cm  LA/Ao: 0.87  LVOT diam: 2.3 cm  LVOT area: 4.3 cm2  LA Volume (BP): 54.4 ml  LA Volume Index (BP): 25.7 ml/m2  RWT: 0.66     Doppler Measurements & Calculations  LV V1 max PG: 3.1 mmHg  LV V1 max: 86.8 cm/sec  LV V1 VTI: 12.2 cm  SV(LVOT): 52.6 ml  SI(LVOT): 24.8 ml/m2  PA acc time: 0.10 sec      ______________________________________________________________________________  Report approved by: Sharonda Humphreys 07/30/2021 09:40 AM                  Thank you for allowing me to participate in the care of your patient.      Sincerely,     Raheel Schaffer MD     [facility]  cc:   Maria Isabel Raines, PA-C  [facility] SPECIAL[facility]  [address] [place] [address]

## 2021-09-14 DIAGNOSIS — Z11.59 ENCOUNTER FOR SCREENING FOR OTHER VIRAL DISEASES: ICD-10-CM

## 2021-09-15 ENCOUNTER — THERAPY VISIT (OUTPATIENT)
Dept: PHYSICAL THERAPY | Facility: CLINIC | Age: 81
End: 2021-09-15
Payer: COMMERCIAL

## 2021-09-15 ENCOUNTER — VIRTUAL VISIT (OUTPATIENT)
Dept: UROLOGY | Facility: CLINIC | Age: 81
End: 2021-09-15
Attending: INTERNAL MEDICINE
Payer: COMMERCIAL

## 2021-09-15 VITALS — BODY MASS INDEX: 29.33 KG/M2 | HEIGHT: 69 IN | WEIGHT: 198 LBS

## 2021-09-15 DIAGNOSIS — M54.6 ACUTE MIDLINE THORACIC BACK PAIN: ICD-10-CM

## 2021-09-15 DIAGNOSIS — E11.649 TYPE 2 DIABETES MELLITUS WITH HYPOGLYCEMIA WITHOUT COMA, WITHOUT LONG-TERM CURRENT USE OF INSULIN (H): ICD-10-CM

## 2021-09-15 DIAGNOSIS — M54.2 CERVICAL PAIN: ICD-10-CM

## 2021-09-15 DIAGNOSIS — C91.10 CLL (CHRONIC LYMPHOCYTIC LEUKEMIA) (H): ICD-10-CM

## 2021-09-15 DIAGNOSIS — R39.9 LOWER URINARY TRACT SYMPTOMS (LUTS): Primary | ICD-10-CM

## 2021-09-15 DIAGNOSIS — R97.20 ELEVATED PROSTATE SPECIFIC ANTIGEN (PSA): ICD-10-CM

## 2021-09-15 PROCEDURE — 99204 OFFICE O/P NEW MOD 45 MIN: CPT | Mod: 95 | Performed by: STUDENT IN AN ORGANIZED HEALTH CARE EDUCATION/TRAINING PROGRAM

## 2021-09-15 PROCEDURE — 97110 THERAPEUTIC EXERCISES: CPT | Mod: GP | Performed by: PHYSICAL THERAPIST

## 2021-09-15 PROCEDURE — 97112 NEUROMUSCULAR REEDUCATION: CPT | Mod: GP | Performed by: PHYSICAL THERAPIST

## 2021-09-15 RX ORDER — TAMSULOSIN HYDROCHLORIDE 0.4 MG/1
0.4 CAPSULE ORAL DAILY
Qty: 90 CAPSULE | Refills: 0 | Status: SHIPPED | OUTPATIENT
Start: 2021-09-15 | End: 2021-12-14

## 2021-09-15 ASSESSMENT — MIFFLIN-ST. JEOR: SCORE: 1603.5

## 2021-09-15 ASSESSMENT — PAIN SCALES - GENERAL: PAINLEVEL: NO PAIN (0)

## 2021-09-15 NOTE — LETTER
9/15/2021       RE: Austin Garza  [address] [place] [address]     Dear Colleague,    Thank you for referring your patient, Austin Garza, to the [facility] [place] at [facility]. Please see a copy of my visit note below.    Benji is a 81 year old who is being evaluated via a billable video visit.      How would you like to obtain your AVS? MyChart  If the video visit is dropped, the invitation should be resent by: Other e-mail: MY CHART, IF NOT WORKING TEXT [phone].    Will anyone else be joining your video visit? No    Paige Thomas CMA      Video Start Time: 3:09pm   Video-Visit Details    Type of service:  Video Visit    Video End Time:3:27 PM    Originating Location (pt. Location): Home    Distant Location (provider location):  [facility] [place]     Platform used for Video Visit: Julian         Chief Complaint:    LUTS           Consult or Referral:     Mr. Austin Garza is a 81 year old male seen at the request of Dr. Munson.         History of Present Illness:    Austin Garza is a very pleasant 81 year old male who presents with a history of LUTS.      Reviewed previous notes from Dr. Munson  Benji has a lot of things going on right now including LUTS. He has been recently started on insulin with diabetes and has been admitted twice in the last couple of months for Afib.  He is also a known patient of [facility] since 2017 and was on Ibrutinib until recently which as been stopped due to his issues with low counts an low platelets.  He has poor flow, intermittent stream and incomplete evacuation along with nocturia. He has some issues with sleep and this partly has been due to the nocturia.             Past Medical History:     Past Medical History:   Diagnosis Date     AAA (abdominal aortic aneurysm)      BCC (basal cell carcinoma of skin) - face      CLL (chronic lymphocytic  leukemia)      Diaphragmatic hernia      Diverticulitis of colon      Esophageal reflux      Essential hypertension      Hyperlipidemia      Irritable bowel syndrome      Macular degeneration (senile) of retina      Mumps      Squamous Cell Carcinoma, Back 1988     Type 2 diabetes mellitus             Past Surgical History:     Past Surgical History:   Procedure Laterality Date     ANESTHESIA CARDIOVERSION N/A 8/2/2021    Procedure: ANESTHESIA, FOR CARDIOVERSION;  Surgeon: GENERIC ANESTHESIA PROVIDER;  Location: [facility] OR     APPENDECTOMY OPEN  1966     BONE MARROW BIOPSY, BONE SPECIMEN, NEEDLE/TROCAR N/A 11/21/2017    Procedure: BIOPSY BONE MARROW;  BONE MARROW BIOPSY;  Surgeon: Shady Garcia MD;  Location:  GI     COLONOSCOPY  2002     ENDOVASCULAR REPAIR ANEURYSM ABDOMINAL AORTA N/A 12/4/2017    Procedure: ENDOVASCULAR REPAIR ANEURYSM ABDOMINAL AORTA;  ENDOVASCULAR REPAIR ANEURYSM ABDOMINAL AORTA;  Surgeon: Milton Cazares MD;  Location:  OR     Fistulotomy with marsupialization for repair of fisture in ano  2007     IR ABDOMINAL AORTOGRAM  3/11/2019     IR VISCERAL EMBOLIZATION  5/13/2019     Multiple skin tags - resection  1998     Squamous cell skin cancer resection - back  1985-90     VASECTOMY              Medications     Current Outpatient Medications   Medication     apixaban ANTICOAGULANT (ELIQUIS) 5 MG tablet     blood glucose monitoring (NO BRAND SPECIFIED) meter device kit     clopidogrel (PLAVIX) 75 MG tablet     CONTOUR NEXT TEST test strip     insulin glargine (LANTUS PEN) 100 UNIT/ML pen     insulin pen needle (BD SHRUTHI U/F) 32G X 4 MM miscellaneous     metFORMIN (GLUCOPHAGE-XR) 500 MG 24 hr tablet     metoprolol succinate ER (TOPROL-XL) 50 MG 24 hr tablet     Multiple Vitamins-Minerals (CENTRUM SILVER) per tablet     rosuvastatin (CRESTOR) 20 MG tablet     tamsulosin (FLOMAX) 0.4 MG capsule     ACCU-CHEK GUIDE test strip     desonide (DESOWEN) 0.05 % external ointment     No  current facility-administered medications for this visit.            Family History:     Family History   Problem Relation Age of Onset     Cerebrovascular Disease Father         x 2     Hypertension Mother      C.A.D. Mother      Cancer Mother         skin     Eye Disorder Mother         glaucoma     Prostate Cancer No family hx of      Cancer - colorectal No family hx of      Glaucoma No family hx of      Macular Degeneration No family hx of               Allergies:   Ace inhibitors         Review of Systems:  From intake questionnaire     Skin: negative  Eyes: negative  Ears/Nose/Throat: negative  Respiratory: No shortness of breath, dyspnea on exertion, cough, or hemoptysis  Cardiovascular: No chest pain or palpitations  Gastrointestinal: negative; no nausea/vomiting, constipation or diarrhea  Genitourinary: as per HPI  Musculoskeletal: negative  Neurologic: negative  Psychiatric: negative  Hematologic/Lymphatic/Immunologic: negative  Endocrine: negative         Physical Exam:   This is a virtual visit    Alert, no acute distress, oriented, conversant    Ears/nose/mouth: mouth:normal, good dentition  Respiratory: no respiratory distress, or pursed lip breathing  Cardiovascular:no obvious jugular venous distension present  Skin: no suspicious lesions or rashes on Visible body parts on the Screen  Neuro: Alert, oriented, speech and mentation normal  Psych: affect and mood normal, alert and oriented to person, place and time      Labs and Pathology:    The following labs were reviewed by me and discussed with the patient:  UA: Normal  Significant for   Lab Results   Component Value Date    CR 1.62 09/10/2021    CR 1.46 09/02/2021    CR 1.41 08/24/2021    CR 1.44 08/23/2021    CR 1.56 08/10/2021    CR 1.69 08/02/2021    CR 1.69 07/31/2021    CR 1.58 07/30/2021    CR 1.54 07/13/2021    CR 1.51 06/01/2021    CR 1.57 05/04/2021    CR 1.48 04/05/2021    CR 1.57 03/03/2021    CR 1.56 02/01/2021    CR 1.61 01/05/2021     CR 1.54 12/08/2020    CR 1.61 11/03/2020    CR 1.84 10/02/2020    CR 1.67 09/04/2020     PSA   Date Value Ref Range Status   12/31/2019 5.49 (H) 0 - 4 ug/L Final     Comment:     Assay Method:  Chemiluminescence using Siemens Vista analyzer   12/17/2019 5.89 (H) 0 - 4 ug/L Final     Comment:     Assay Method:  Chemiluminescence using Siemens Vista analyzer   03/15/2004 1.20 0 - 4 ug/L Final             Imaging:    The following imaging exams were independently viewed and interpreted by me and discussed with patient:  CT Scan Abd/Pelvis: Abnormal: Enlarged prostate 58 X 36 X 46 mm approx: 45gm             Assessment and Plan:     Assessment:     Lower urinary tract symptoms (LUTS)  Significant LUTS which are quite bothersome. Will start him on flomax and see resposnse in 3 months  Discussed about the benefits and the issues with flomax and the alternate option of surgical and office based procedures.  He is agreeable to the plan and will see us back in 3 months.  - tamsulosin (FLOMAX) 0.4 MG capsule; Take 1 capsule (0.4 mg) by mouth daily    Elevated prostate specific antigen (PSA)  He is 81 and the last PSA was 2 years ago, therefore would recommend an inperson visit to get an exam done.   - Adult Urology Referral    Type 2 diabetes mellitus with hypoglycemia without coma, without long-term current use of insulin (H)  Currently on insulin    CLL (chronic lymphocytic leukemia) (H)  Ibrutinib on hold      Plan:  Review in 3 months          Wenceslao Teran MD  [facility] [place]    ==========================    Additional Billing and Coding Information:  Review of external notes as documented above   Review of the result(s) of each unique test - PSA, Creat    Independent interpretation of a test performed by another physician/other qualified health care professional (not separately reported) - CT abdomen to assess prostate size    Discussion of management or test interpretation with external  physician/other qualified healthcare professional/appropriate source -       Diagnosis or treatment significantly limited by social determinants of health -       25 minutes spent on the date of the encounter doing chart review, review of test results, interpretation of tests, patient visit and documentation     ==========================

## 2021-09-15 NOTE — PROGRESS NOTES
Benji is a 81 year old who is being evaluated via a billable video visit.      How would you like to obtain your AVS? MyChart  If the video visit is dropped, the invitation should be resent by: Other e-mail: MY CHART, IF NOT WORKING TEXT [phone].    Will anyone else be joining your video visit? No    Paige Thomas CMA      Video Start Time: 3:09pm   Video-Visit Details    Type of service:  Video Visit    Video End Time:3:27 PM    Originating Location (pt. Location): Home    Distant Location (provider location):  [facility] [place]     Platform used for Video Visit: Vend         Chief Complaint:    LUTS           Consult or Referral:     Mr. Austin Garza is a 81 year old male seen at the request of Dr. Munson.         History of Present Illness:    Austin Garza is a very pleasant 81 year old male who presents with a history of LUTS.      Reviewed previous notes from Dr. Munson  Benji has a lot of things going on right now including LUTS. He has been recently started on insulin with diabetes and has been admitted twice in the last couple of months for Afib.  He is also a known patient of [facility] since 2017 and was on Ibrutinib until recently which as been stopped due to his issues with low counts an low platelets.  He has poor flow, intermittent stream and incomplete evacuation along with nocturia. He has some issues with sleep and this partly has been due to the nocturia.             Past Medical History:     Past Medical History:   Diagnosis Date     AAA (abdominal aortic aneurysm)      BCC (basal cell carcinoma of skin) - face      CLL (chronic lymphocytic leukemia)      Diaphragmatic hernia      Diverticulitis of colon      Esophageal reflux      Essential hypertension      Hyperlipidemia      Irritable bowel syndrome      Macular degeneration (senile) of retina      Mumps      Squamous Cell Carcinoma, Back 1988     Type 2 diabetes mellitus             Past Surgical  History:     Past Surgical History:   Procedure Laterality Date     ANESTHESIA CARDIOVERSION N/A 8/2/2021    Procedure: ANESTHESIA, FOR CARDIOVERSION;  Surgeon: GENERIC ANESTHESIA PROVIDER;  Location: [facility] OR     APPENDECTOMY OPEN  1966     BONE MARROW BIOPSY, BONE SPECIMEN, NEEDLE/TROCAR N/A 11/21/2017    Procedure: BIOPSY BONE MARROW;  BONE MARROW BIOPSY;  Surgeon: Shady Garcia MD;  Location:  GI     COLONOSCOPY  2002     ENDOVASCULAR REPAIR ANEURYSM ABDOMINAL AORTA N/A 12/4/2017    Procedure: ENDOVASCULAR REPAIR ANEURYSM ABDOMINAL AORTA;  ENDOVASCULAR REPAIR ANEURYSM ABDOMINAL AORTA;  Surgeon: Milton Cazares MD;  Location:  OR     Fistulotomy with marsupialization for repair of fisture in ano  2007     IR ABDOMINAL AORTOGRAM  3/11/2019     IR VISCERAL EMBOLIZATION  5/13/2019     Multiple skin tags - resection  1998     Squamous cell skin cancer resection - back  1985-90     VASECTOMY              Medications     Current Outpatient Medications   Medication     apixaban ANTICOAGULANT (ELIQUIS) 5 MG tablet     blood glucose monitoring (NO BRAND SPECIFIED) meter device kit     clopidogrel (PLAVIX) 75 MG tablet     CONTOUR NEXT TEST test strip     insulin glargine (LANTUS PEN) 100 UNIT/ML pen     insulin pen needle (BD SHRUTHI U/F) 32G X 4 MM miscellaneous     metFORMIN (GLUCOPHAGE-XR) 500 MG 24 hr tablet     metoprolol succinate ER (TOPROL-XL) 50 MG 24 hr tablet     Multiple Vitamins-Minerals (CENTRUM SILVER) per tablet     rosuvastatin (CRESTOR) 20 MG tablet     tamsulosin (FLOMAX) 0.4 MG capsule     ACCU-CHEK GUIDE test strip     desonide (DESOWEN) 0.05 % external ointment     No current facility-administered medications for this visit.            Family History:     Family History   Problem Relation Age of Onset     Cerebrovascular Disease Father         x 2     Hypertension Mother      C.A.D. Mother      Cancer Mother         skin     Eye Disorder Mother         glaucoma     Prostate Cancer No  family hx of      Cancer - colorectal No family hx of      Glaucoma No family hx of      Macular Degeneration No family hx of               Allergies:   Ace inhibitors         Review of Systems:  From intake questionnaire     Skin: negative  Eyes: negative  Ears/Nose/Throat: negative  Respiratory: No shortness of breath, dyspnea on exertion, cough, or hemoptysis  Cardiovascular: No chest pain or palpitations  Gastrointestinal: negative; no nausea/vomiting, constipation or diarrhea  Genitourinary: as per HPI  Musculoskeletal: negative  Neurologic: negative  Psychiatric: negative  Hematologic/Lymphatic/Immunologic: negative  Endocrine: negative         Physical Exam:   This is a virtual visit    Alert, no acute distress, oriented, conversant    Ears/nose/mouth: mouth:normal, good dentition  Respiratory: no respiratory distress, or pursed lip breathing  Cardiovascular:no obvious jugular venous distension present  Skin: no suspicious lesions or rashes on Visible body parts on the Screen  Neuro: Alert, oriented, speech and mentation normal  Psych: affect and mood normal, alert and oriented to person, place and time      Labs and Pathology:    The following labs were reviewed by me and discussed with the patient:  UA: Normal  Significant for   Lab Results   Component Value Date    CR 1.62 09/10/2021    CR 1.46 09/02/2021    CR 1.41 08/24/2021    CR 1.44 08/23/2021    CR 1.56 08/10/2021    CR 1.69 08/02/2021    CR 1.69 07/31/2021    CR 1.58 07/30/2021    CR 1.54 07/13/2021    CR 1.51 06/01/2021    CR 1.57 05/04/2021    CR 1.48 04/05/2021    CR 1.57 03/03/2021    CR 1.56 02/01/2021    CR 1.61 01/05/2021    CR 1.54 12/08/2020    CR 1.61 11/03/2020    CR 1.84 10/02/2020    CR 1.67 09/04/2020     PSA   Date Value Ref Range Status   12/31/2019 5.49 (H) 0 - 4 ug/L Final     Comment:     Assay Method:  Chemiluminescence using Siemens Vista analyzer   12/17/2019 5.89 (H) 0 - 4 ug/L Final     Comment:     Assay Method:   Chemiluminescence using Siemens Vista analyzer   03/15/2004 1.20 0 - 4 ug/L Final             Imaging:    The following imaging exams were independently viewed and interpreted by me and discussed with patient:  CT Scan Abd/Pelvis: Abnormal: Enlarged prostate 58 X 36 X 46 mm approx: 45gm             Assessment and Plan:     Assessment:     Lower urinary tract symptoms (LUTS)  Significant LUTS which are quite bothersome. Will start him on flomax and see resposnse in 3 months  Discussed about the benefits and the issues with flomax and the alternate option of surgical and office based procedures.  He is agreeable to the plan and will see us back in 3 months.  - tamsulosin (FLOMAX) 0.4 MG capsule; Take 1 capsule (0.4 mg) by mouth daily    Elevated prostate specific antigen (PSA)  He is 81 and the last PSA was 2 years ago, therefore would recommend an inperson visit to get an exam done.   - Adult Urology Referral    Type 2 diabetes mellitus with hypoglycemia without coma, without long-term current use of insulin (H)  Currently on insulin    CLL (chronic lymphocytic leukemia) (H)  Ibrutinib on hold      Plan:  Review in 3 months          Wenceslao Teran MD  [facility] [place]                  ==========================    Additional Billing and Coding Information:  Review of external notes as documented above   Review of the result(s) of each unique test - PSA, Creat    Independent interpretation of a test performed by another physician/other qualified health care professional (not separately reported) - CT abdomen to assess prostate size    Discussion of management or test interpretation with external physician/other qualified healthcare professional/appropriate source -       Diagnosis or treatment significantly limited by social determinants of health -       25 minutes spent on the date of the encounter doing chart review, review of test results, interpretation of tests, patient visit and  documentation     ==========================

## 2021-09-16 ENCOUNTER — TELEPHONE (OUTPATIENT)
Dept: CARDIOLOGY | Facility: CLINIC | Age: 81
End: 2021-09-16

## 2021-09-16 ENCOUNTER — TELEPHONE (OUTPATIENT)
Dept: PEDIATRICS | Facility: CLINIC | Age: 81
End: 2021-09-16

## 2021-09-16 NOTE — TELEPHONE ENCOUNTER
----- Message from Raheel Schaffer MD sent at 9/13/2021  2:49 PM CDT -----  Julien Junior, can you please call him later this week to see how his BPs are doing at home? If his BPs are consistently >140/90, we should start amlodipine 5mg daily thanks!    Call placed to pt to review recent BP readings. No answer - LVM / ACB 09/16/21 2:53 PM BREANNE Pena RN, BSN.     Call placed to pt to review BP / HR since his OV with Dr. Schaffer. Pt reports thathe has not been watching his BP routinely since the time of his OV. Pt reports he is currently meeting with an  and not able to complete a BP. Pt advised to check later and send reading via Clixtr or return call to review.     Additionally pt was advised he should follow up with vascular med to review of he needs to continue Plavix. Pt verbalized understanding. Pt advised Dr. Cazares is no longer with the practice and he will need to establish with alternate provider.    Will await BP results from pt.   BREANNE Pena RN, BSN. 09/21/21 12:39 PM         Per last recommendation of Dr. Schaffer pt is below 140/ 90 at this time and will hold off on any changes. Advised pt to call with any questions / concerns.   BREANNE Pena RN, BSN. 09/21/21 12:58 PM

## 2021-09-16 NOTE — TELEPHONE ENCOUNTER
Can we call patient (and likely his wife - I don't see a consent to communicate on file for her but I anticipate he can give verbal permission over the phone)? Wife submitted a message through her [employer], so it would be helpful to have concerns appropriately documented in Austin's chart and we can address them at that time.     Dr. Munson is out of the office, but I'm happy to try to address some of those concern's in her absence if patient and his wife are ok with that.    Thanks!    Sandi Eastman MD  Internal Medicine-Pediatrics

## 2021-09-16 NOTE — TELEPHONE ENCOUNTER
Per Dr. Eastman, increase Lantus to 22 units. He should see diabetic ed. He should see Conchita tomorrow. Follow up with Dr. Munson or Dr. Jaren guadarrama. Nicole Lion RN on 9/16/2021 at 3:30 PM

## 2021-09-16 NOTE — TELEPHONE ENCOUNTER
Spoke to pt. He is agreeable to appt tomorrow. Scheduled. He will increase his Lantus to 22 units. Discussed how his elevated blood sugars are probably part of the reason he is feeling bad. He does not think he needs to see diabetic ed. Will route to Conchita to review for tomorrow.     Nicole Lion RN on 9/16/2021 at 4:18 PM

## 2021-09-16 NOTE — TELEPHONE ENCOUNTER
Spoke to patient. He is not sure what his wife sent the message about, but he is ok to let me talk to her.     Benji is so fatigued, tired, depressed. Some things have been scary from the cardiologist appt this week. He has had a lot of changes in medications. She is not sure how we can help. She is tearful on the phone. They don't know if all the md's are talking to each other about all the medications.They feel overwhelmed.     Klaudia Santa recommended he start insulin last week. His fasting blood sugar today is 400. He took 18 units this morning. Checked it now and its 416.    Her main concerns are #1. his depression, he told he he feels depressed. He called it malaise. Doesn't know if there is something wrong or if it is depression. #2 Diabetes. She feels this is out of control right now.     With the weekend coming up she is concerned about something bad happening and they will not know what to do. Nicole Lion RN on 9/16/2021 at 2:53 PM

## 2021-09-17 ENCOUNTER — OFFICE VISIT (OUTPATIENT)
Dept: PEDIATRICS | Facility: CLINIC | Age: 81
End: 2021-09-17
Payer: COMMERCIAL

## 2021-09-17 VITALS
DIASTOLIC BLOOD PRESSURE: 80 MMHG | WEIGHT: 208 LBS | SYSTOLIC BLOOD PRESSURE: 138 MMHG | BODY MASS INDEX: 30.72 KG/M2 | TEMPERATURE: 97 F | RESPIRATION RATE: 16 BRPM | HEART RATE: 65 BPM | OXYGEN SATURATION: 99 %

## 2021-09-17 DIAGNOSIS — R73.9 HYPERGLYCEMIA: ICD-10-CM

## 2021-09-17 DIAGNOSIS — I48.91 ATRIAL FIBRILLATION, UNSPECIFIED TYPE (H): ICD-10-CM

## 2021-09-17 DIAGNOSIS — E11.649 TYPE 2 DIABETES MELLITUS WITH HYPOGLYCEMIA WITHOUT COMA, WITHOUT LONG-TERM CURRENT USE OF INSULIN (H): ICD-10-CM

## 2021-09-17 DIAGNOSIS — R53.83 FATIGUE, UNSPECIFIED TYPE: Primary | ICD-10-CM

## 2021-09-17 DIAGNOSIS — C91.10 CLL (CHRONIC LYMPHOCYTIC LEUKEMIA) (H): ICD-10-CM

## 2021-09-17 PROCEDURE — 99214 OFFICE O/P EST MOD 30 MIN: CPT | Performed by: PHYSICIAN ASSISTANT

## 2021-09-17 RX ORDER — GLIMEPIRIDE 1 MG/1
1 TABLET ORAL
Qty: 30 TABLET | Refills: 0 | Status: SHIPPED | OUTPATIENT
Start: 2021-09-17 | End: 2021-10-12

## 2021-09-17 NOTE — PATIENT INSTRUCTIONS
1. Continue with metformin XR 1000 mg daily  2. Continue with insulin 22 units every AM  3. Begin glimiperide 1 mg daily   4. Continue to check blood sugars   5. Try to get some increased activity   6. Follow up with Dr. Eastman on Monday, Sept 20 at 245 pm. You may bring your wife.

## 2021-09-17 NOTE — PROGRESS NOTES
"Assessment & Plan     Fatigue, unspecified type  Multifactorial. Likely due to recent hyperglycemic values as well as atrial fibrillation.     Hyperglycemia  Again, multifactorial. Likely due in part to inactivity and change in diet post discharge.     Type 2 diabetes mellitus with hypoglycemia without coma, without long-term current use of insulin (H)  Dr. Eastman has increased insulin to from 18 to 22 units daily (started yesterday). Will also add glimepiride under PCP advisement.   Increase activities slowly. Continue to monitor diet.   - glimepiride (AMARYL) 1 MG tablet; Take 1 tablet (1 mg) by mouth every morning (before breakfast)    Atrial fibrillation, unspecified type (H)    CLL (chronic lymphocytic leukemia) (H)    Patient was seen in conjunction with Dr. Eastman today. Patient will return in three days for follow up. He has no further questions.     Oral Fisher PA-C  [facility] CYRUS Leblanc is a 81 year old who presents for the following health issues:    HPI   FATIGUE    Patient feels \"weak, tired, worn out, beat down.\" He sits in a chair all day and has no energy.     Not sleeping well. Up all night.     Patient was in the ED one month ago for chest pain. He had a thorough work up.     He returned for his back pain and symptoms improved with physical therapy.     Pertinent PMH:   1. A fib recently diagnosed--seen by Dr. Schaffer earlier this week. Currently in A.fib. Cardioversion scheduled on 9/30/21. On eliquis twice daily. Has not started plavix from cards yet.   2. Diabetes--values have increased over the past month and worsened over the past two weeks after discharge. Values in the 250s-400. Lantus 22 units qAM. Metformin 1000 mg qAM. Patient has discontinued jardiance (took for one day)  How often are you checking your blood sugar? Two times daily  Blood sugar testing frequency justification:  Patient modifying lifestyle changes (diet, exercise) with blood " sugars  What time of day are you checking your blood sugars (select all that apply)?  Before meals and before lunch or dinner  Have you had any blood sugars above 200?  Yes   Have you had any blood sugars below 70?  No    What symptoms do you notice when your blood sugar is low?  None    What concerns do you have today about your diabetes? None and Blood sugar is often over 200     Do you have any of these symptoms? (Select all that apply)  Numbness in feet and Excessive thirst    BP Readings from Last 2 Encounters:   09/17/21 138/80   09/13/21 (!) 170/64     Hemoglobin A1C (%)   Date Value   07/30/2021 7.0 (H)   04/05/2021 5.9 (H)   01/15/2021 6.7 (H)     LDL Cholesterol Calculated (mg/dL)   Date Value   04/05/2021 96   01/15/2021 27     3. CLL with mild anemia    Review of Systems   Constitutional, HEENT, cardiovascular, pulmonary, gi and gu systems are negative, except as otherwise noted.      Objective    /80   Pulse 65   Temp 97  F (36.1  C) (Tympanic)   Resp 16   Wt 94.3 kg (208 lb)   SpO2 99%   BMI 30.72 kg/m    Body mass index is 30.72 kg/m .  Physical Exam   GENERAL: alert and no distress  EYES: Eyes grossly normal to inspection, PERRL and conjunctivae and sclerae normal  NECK: no adenopathy  RESP: lungs clear to auscultation - no rales, rhonchi or wheezes  CV: irregular rhythm and regular rate, normal S1 S2, no S3 or S4, no murmur, click or rub, no peripheral edema  Psych:  mentation appears normal and affect normal/bright    No results found for any visits on 09/17/21.

## 2021-09-20 ENCOUNTER — OFFICE VISIT (OUTPATIENT)
Dept: PEDIATRICS | Facility: CLINIC | Age: 81
End: 2021-09-20
Payer: COMMERCIAL

## 2021-09-20 VITALS
SYSTOLIC BLOOD PRESSURE: 126 MMHG | DIASTOLIC BLOOD PRESSURE: 80 MMHG | HEIGHT: 69 IN | HEART RATE: 108 BPM | OXYGEN SATURATION: 99 % | BODY MASS INDEX: 30.9 KG/M2 | TEMPERATURE: 97.2 F | WEIGHT: 208.6 LBS | RESPIRATION RATE: 20 BRPM

## 2021-09-20 DIAGNOSIS — N18.31 STAGE 3A CHRONIC KIDNEY DISEASE (H): ICD-10-CM

## 2021-09-20 DIAGNOSIS — E11.65 TYPE 2 DIABETES MELLITUS WITH HYPERGLYCEMIA, UNSPECIFIED WHETHER LONG TERM INSULIN USE (H): Primary | ICD-10-CM

## 2021-09-20 DIAGNOSIS — I48.91 ATRIAL FIBRILLATION, UNSPECIFIED TYPE (H): ICD-10-CM

## 2021-09-20 PROCEDURE — 99215 OFFICE O/P EST HI 40 MIN: CPT | Performed by: INTERNAL MEDICINE

## 2021-09-20 ASSESSMENT — MIFFLIN-ST. JEOR: SCORE: 1651.58

## 2021-09-20 NOTE — PATIENT INSTRUCTIONS
Diabetes:  - continue with your current medications: insulin 22 units daily, glimepiride 1mg daily, and metformin 1000mg (2 tabs of the 500mg) daily  - follow-up with Klaudia Santa later this week if scheduled  - stop the glimepiride if your blood sugars are consistently lower than 150    Heart:   - let us know once you get set up with [facility] for this. If you ever need to (or want to) see someone else here in the [facility] area let us know  - metoprolol tartrate 50mg twice daily is the same as metoprolol succinate 50mg once daily

## 2021-09-20 NOTE — PROGRESS NOTES
Assessment & Plan     Type 2 diabetes mellitus with hyperglycemia, unspecified whether long term insulin use (H)  Improving blood sugars since restarting glimiperide 1mg daily. Anticipate that much of his recent hyperglycemia may be due to inactivity in setting of recent hospitalizations and Afib diagnosis. I am hopeful that his blood sugars will improve as he is more active, particularly once Afib is better controlled. Will continue with current regimen - metformin 1000mg daily, glimepiride 1mg daily, and lantus 22 units daily until follow-up with MTM later this week (Thursday).     Stage 3a chronic kidney disease (H)  Stable, will need continued monitoring. May benefit from tapering down on metformin in future.     Atrial fibrillation, unspecified type (H)  Following with Cardiology. Patient did express some feelings of being overwhelmed with the information presented at his recent visit; would like to hold off on cardioversion at this time and get second opinion at [facility]. Will message patient's cardiologist, Dr. Schaffer as FYI. HR somewhat elevated today, when patient follows up with MTM would consider increasing metoprolol if still tachycardic.       50 minutes spent on the date of the encounter doing chart review, history and exam, documentation and further activities per the note     Blood sugar testing frequency justification:  Uncontrolled diabetes  Patient Instructions   Diabetes:  - continue with your current medications: insulin 22 units daily, glimepiride 1mg daily, and metformin 1000mg (2 tabs of the 500mg) daily  - follow-up with Klaudia Santa later this week if scheduled  - stop the glimepiride if your blood sugars are consistently lower than 150    Heart:   - let us know once you get set up with [facility] for this. If you ever need to (or want to) see someone else here in the [facility] area let us know  - metoprolol tartrate 50mg twice daily is the same as metoprolol succinate 50mg once  daily        Return in about 3 months (around 12/20/2021).    Sandi Eastman MD  [facility]    Addendum:  After discussion with MTM on 9-23-21 patient notes that he plans to proceed with cardioversion through [facility], but will still look into getting second opinion at [place] for long term management. Will hold off on metoprolol adjustment as he is scheduled to undergo cardioversion next week.     Sandi Eastman MD  Internal Medicine-Pediatrics      Tawanda Leblanc is a 81 year old who presents for the following health issues   History of Present Illness       Diabetes:   He presents for follow up of diabetes.  He is checking home blood glucose two times daily. He checks blood glucose before meals.  Blood glucose is sometimes over 200 and never under 70. When his blood glucose is low, the patient is asymptomatic for confusion, blurred vision, lethargy and reports not feeling dizzy, shaky, or weak.  He is concerned about blood sugar frequently over 200.  He is having numbness in feet and weight loss.         He eats 0-1 servings of fruits and vegetables daily.He consumes 0 sweetened beverage(s) daily.He exercises with enough effort to increase his heart rate 9 or less minutes per day.    He is taking medications regularly.     Blood sugars have been down in the 190-200s. Hasn't been much more active since he was seen last week in clinic. No changes to diet, notes that he has been much less active recently.     Is planning to transfer his cardiac care to [facility] - was overwhelmed at his recent appointment with Dr. Schaffer. Doesn't want to proceed with cardioversion next week. Filled metoprolol succinate, but didn't see that it was prescribed for twice daily so is back to taking metoprolol tartrate BID. No issues with palpitations. Just feels tired. No chest pain or shortness of breath.         Review of Systems   Constitutional, HEENT, cardiovascular, pulmonary, gi and gu systems are negative,  "except as otherwise noted.      Objective    /80 (BP Location: Right arm, Patient Position: Chair, Cuff Size: Adult Regular)   Pulse 108   Temp 97.2  F (36.2  C) (Tympanic)   Resp 20   Ht 1.753 m (5' 9\")   Wt 94.6 kg (208 lb 9.6 oz)   SpO2 99%   BMI 30.80 kg/m    Body mass index is 30.8 kg/m .  Physical Exam   GENERAL: healthy, alert and no distress  CV: tachycardic and irregularly irregular, normal S1 S2, no S3 or S4, no murmur            "

## 2021-09-23 ENCOUNTER — TELEPHONE (OUTPATIENT)
Dept: CARDIOLOGY | Facility: CLINIC | Age: 81
End: 2021-09-23

## 2021-09-23 ENCOUNTER — VIRTUAL VISIT (OUTPATIENT)
Dept: PHARMACY | Facility: CLINIC | Age: 81
End: 2021-09-23
Attending: INTERNAL MEDICINE
Payer: COMMERCIAL

## 2021-09-23 DIAGNOSIS — I10 HYPERTENSION GOAL BP (BLOOD PRESSURE) < 140/90: ICD-10-CM

## 2021-09-23 DIAGNOSIS — E11.649 TYPE 2 DIABETES MELLITUS WITH HYPOGLYCEMIA WITHOUT COMA, WITHOUT LONG-TERM CURRENT USE OF INSULIN (H): Primary | ICD-10-CM

## 2021-09-23 DIAGNOSIS — I48.91 ATRIAL FIBRILLATION, UNSPECIFIED TYPE (H): ICD-10-CM

## 2021-09-23 DIAGNOSIS — I77.70 ARTERY DISSECTION (H): ICD-10-CM

## 2021-09-23 PROCEDURE — 99607 MTMS BY PHARM ADDL 15 MIN: CPT | Performed by: PHARMACIST

## 2021-09-23 PROCEDURE — 99606 MTMS BY PHARM EST 15 MIN: CPT | Performed by: PHARMACIST

## 2021-09-23 RX ORDER — METFORMIN HCL 500 MG
1000 TABLET, EXTENDED RELEASE 24 HR ORAL DAILY
Qty: 180 TABLET | Refills: 0 | Status: SHIPPED | OUTPATIENT
Start: 2021-09-23 | End: 2021-10-13

## 2021-09-23 RX ORDER — METOPROLOL SUCCINATE 100 MG/1
150 TABLET, EXTENDED RELEASE ORAL DAILY
Qty: 135 TABLET | Refills: 0 | Status: SHIPPED | OUTPATIENT
Start: 2021-09-23 | End: 2021-09-23

## 2021-09-23 NOTE — TELEPHONE ENCOUNTER
Called patient to review Cardioversion procedure prep instructions below.   No answer, left voicemail to call me back. Will review the following instructions: 09/23/21 No other significant past medical history or family history.     Patient is scheduled for a Cardioversion (DCCV) at [facility]) on 9/28/2021date, check-in time 0930AM, procedure time 1130AM .     Patient should have no food or drinks after midnight on 9/27/2021date (the night before the procedure).     Patient is not taking a diuretic.     Patient is taking insulin. Patient should contact their PCP for recommendations on dosing on the morning of the procedure.     Patient is is taking oral diabetic medications - Advised not to take the morning of procedure, can resume after the procedure.       Patient is taking Eliquis and has been taking daily uninterrupted for at least 4 weeks.      Patient can take their other daily medications the morning of the procedure with small sips of water.     Patient has a responsible adult to drive them home and stay with them for 24 hours after the procedure.     Confirmed follow-up appointment scheduled 10/13/2021 date at 1:30 time with Maria Isabel Raines PA-C provider at the [facility].     Patient had no questions about the instructions.       BREANNE Pena RN, BSN.   [facility]   09/23/21 2:14 PM

## 2021-09-23 NOTE — PROGRESS NOTES
Medication Therapy Management (MTM) Encounter    ASSESSMENT:                            Medication Adherence/Access: No issues identified    Type 2 Diabetes:  Patient would benefit from continuing to monitor his blood sugar, readings seem to be improving. No changes today given this and glimepiride was just recently started. Patient benefits from further education/reminder of treatment of hypoglycemia.    AAA (repair in 2017)/Afib/HTN: follow-up with cardiology as planned. There was some confusion as patient had previously considered waiting on follow-up with Dr. Schaffer which in that cause was considering titration in beta-blocker. However, given cardioversion in a few days no change today. Continue with cardiology team.      PLAN:                            1. Continue Lantus 22 units daily     2. If you have a low sugar then treat with about 15 grams of carbohydrates or ~ 1/2 cup of the sugary drink you have at home, then recheck again in 15 minutes to ensure sugars are over 70-80.     3. Huddled with Dr. Eastman and patient to confirm patient is following with Dr. Schaffer's cardiology team. Canceled the changed in metoprolol, continue same dose unchanged. Patient verbalized understanding. MTM also sent a message to Dr. Schaffer to confirm per request of Dr. Eastman to avoid her previous message to Dr. Schaffer regarding cancelling appointments.     Follow-up: Return in about 20 days (around 10/13/2021) for Medication Therapy Telephone Visit.      SUBJECTIVE/OBJECTIVE:                          Austin Garza is a 81 year old male called for a follow-up visit. He was referred to me from Dr. Eastman.  Today's visit is a follow-up MTM visit from 9/10/21.     Reason for visit: blood sugar follow-up. He reports blood sugar are better. He is wondering what to do if his sugars go low.     Allergies/ADRs: Reviewed in chart  Past Medical History: Reviewed in chart - hearing loss, AAA, artery disection, basal cell carcinoma - face, anemia,  CLL, elevated PSA.  Tobacco: He reports that he quit smoking about 46 years ago. He has a 10.00 pack-year smoking history. He has quit using smokeless tobacco.  Alcohol: not currently using    Medication Adherence/Access: he tells me that he looked at the last AVS provided to him and closely reviewed the doses as the medications keep changing.   Patient uses pill box(es), sets up himself.  Patient takes medications 2 time(s) per day.   The patient fills medications at [facility]: NO, fills medications at [facility].    Type 2 Diabetes:  Currently taking Lantus 22 units daily (was started on 16 units and directed to increase by 2 units every 4 days, confirms has not increased dose in 4+ days), glimepiride 1 mg in the morning (hold if FBG < 150 mg/dL), metformin  mg, takes 2 tablets once daily.   Patient is not experiencing side effects. He admits ideally long-term wants to get off insulin but for now has appreciated the improvement in sugars.   Blood sugar monitoring: from his glucometer:  Date FBG/ 2hours post Lunch/2hours post   9/23 157 166   9/22 187    9/21 191    9/20 192    9/19 225    9/18 306    Eye exam: up to date  Foot exam: up to date  Aspirin: Taking 325mg daily and has the following issues: bruising, stomach upset and small black dots on skin note by heme/onc prior too.  Statin: No, see below.  ACEi/ARB: Yes: losartan-hydrochlorothiazide .   Urine Albumin:   Lab Results   Component Value Date    UMALCR 31.92 (H) 12/17/2019     Lab Results   Component Value Date    A1C 7.0 07/30/2021    A1C 5.9 04/05/2021    A1C 6.7 01/15/2021    A1C 5.6 08/23/2020    A1C 5.5 07/14/2020    A1C 6.3 12/31/2019     AAA (repair in 2017)/Afib/HTN: He has a cardioversion planned in the next few days. Patient is following with Dr. Schaffer cardiology. Plans to also set up a future visit with [facility] in the future.  Home Heart rate: he reports HR the past 3 days have been in the 100 bpm.  His blood pressure checked at home was  146/87 (107), he checked it again and he tells me it is the same.   Medications:  - Eliquis 5mg twice daily  - metoprolol XR 50mg twice daily  - rosuvastatin 20mg daily   - clopidogrel 75 mg daily   He has bruising but no active bleeding concerns.  Prior to his last 2 hospital visits he was on losartan 50-hydrochlorothiazide 12.5 mg in the morning.   Specialty:  Has a visit with cardiologty on 9/13 with Dr. Schaffer.  BP Readings from Last 3 Encounters:   09/20/21 126/80   09/17/21 138/80   09/13/21 (!) 170/64     Today's Vitals: There were no vitals taken for this visit.  ----------------  Post Discharge Medication Reconciliation Status: medication reconcilation previously completed during another office visit.    I spent 30 minutes with this patient today. All changes were made via collaborative practice agreement with Sandi Eastman. A copy of the visit note was provided to the patient's primary care provider.    The patient was sent via [employer] a summary of these recommendations.     Klaudia Santa, PharmD  Medication Therapy Management Pharmacist  Pager#: [phone]    Telemedicine Visit Details  Type of service:  Telephone visit  Start Time: 1:35 pm  End Time: 2:05 pm  Originating Location (patient location): Home  Distant Location (provider location):  [facility]     Medication Therapy Recommendations  No medication therapy recommendations to display

## 2021-09-24 ENCOUNTER — LAB (OUTPATIENT)
Dept: LAB | Facility: CLINIC | Age: 81
End: 2021-09-24
Payer: COMMERCIAL

## 2021-09-24 DIAGNOSIS — Z11.59 ENCOUNTER FOR SCREENING FOR OTHER VIRAL DISEASES: ICD-10-CM

## 2021-09-24 PROCEDURE — U0005 INFEC AGEN DETEC AMPLI PROBE: HCPCS

## 2021-09-24 PROCEDURE — U0003 INFECTIOUS AGENT DETECTION BY NUCLEIC ACID (DNA OR RNA); SEVERE ACUTE RESPIRATORY SYNDROME CORONAVIRUS 2 (SARS-COV-2) (CORONAVIRUS DISEASE [COVID-19]), AMPLIFIED PROBE TECHNIQUE, MAKING USE OF HIGH THROUGHPUT TECHNOLOGIES AS DESCRIBED BY CMS-2020-01-R: HCPCS

## 2021-09-25 LAB — SARS-COV-2 RNA RESP QL NAA+PROBE: NEGATIVE

## 2021-09-25 NOTE — PATIENT INSTRUCTIONS
Recommendations from today's MTM visit:                                                      Cancelled lab appointment     Continue with cardiology appointments    Continue Lantus 22 units daily    Symptoms suggestive of hypoglycemia: nervousness, sweating, intense hunger, trembling, weakness, palpitations, and difficulty speaking.     If experiencing symptoms of hypoglycemia or blood sugar is less than 70:  Treat with 15 grams of glucose (sugar), followed by an assessment of symptoms and a blood glucose check if possible. If after 15 minutes there is no improvement, another 10-15 grams should be given. This can be repeated up to three times. The equivalency of 10-15 grams of glucose (approximate servings) are: 4-6 hard candies, 4 teaspoons of sugar, or 1/2 can of regular soda or juice.      Follow-up: Return in about 20 days (around 10/13/2021) for Medication Therapy Telephone Visit.    It was great to speak with you today.  I value your experience and would be very thankful for your time with providing feedback on our clinic survey. You may receive a survey via email or text message in the next few days.     To schedule another MTM appointment, please call the clinic directly or you may call the MTM scheduling line at [phone] or toll-free at [phone].     My Clinical Pharmacist's contact information:                                                      Please feel free to contact me with any questions or concerns you have.      Klaudia Santa, PharmD  Medication Therapy Management Pharmacist  Pager#: [phone]

## 2021-09-26 ENCOUNTER — APPOINTMENT (OUTPATIENT)
Dept: CT IMAGING | Facility: CLINIC | Age: 81
End: 2021-09-26
Attending: PHYSICIAN ASSISTANT
Payer: COMMERCIAL

## 2021-09-26 ENCOUNTER — NURSE TRIAGE (OUTPATIENT)
Dept: NURSING | Facility: CLINIC | Age: 81
End: 2021-09-26

## 2021-09-26 ENCOUNTER — OFFICE VISIT (OUTPATIENT)
Dept: URGENT CARE | Facility: URGENT CARE | Age: 81
End: 2021-09-26
Payer: COMMERCIAL

## 2021-09-26 ENCOUNTER — HOSPITAL ENCOUNTER (EMERGENCY)
Facility: CLINIC | Age: 81
Discharge: ANOTHER HOSPITAL | End: 2021-09-27
Attending: PHYSICIAN ASSISTANT | Admitting: PHYSICIAN ASSISTANT
Payer: COMMERCIAL

## 2021-09-26 VITALS
HEART RATE: 99 BPM | SYSTOLIC BLOOD PRESSURE: 126 MMHG | OXYGEN SATURATION: 100 % | DIASTOLIC BLOOD PRESSURE: 72 MMHG | TEMPERATURE: 98.4 F

## 2021-09-26 DIAGNOSIS — R10.9 FLANK PAIN: Primary | ICD-10-CM

## 2021-09-26 DIAGNOSIS — I31.9 PERICARDITIS: ICD-10-CM

## 2021-09-26 DIAGNOSIS — R11.0 NAUSEA: ICD-10-CM

## 2021-09-26 DIAGNOSIS — R31.0 GROSS HEMATURIA: ICD-10-CM

## 2021-09-26 DIAGNOSIS — N20.1 URETERAL STONE: ICD-10-CM

## 2021-09-26 DIAGNOSIS — N17.9 ACUTE KIDNEY INJURY (H): ICD-10-CM

## 2021-09-26 DIAGNOSIS — I31.39 PERICARDIAL EFFUSION: ICD-10-CM

## 2021-09-26 LAB
ABO/RH(D): NORMAL
ALBUMIN SERPL-MCNC: 3.1 G/DL (ref 3.4–5)
ALBUMIN UR-MCNC: 100 MG/DL
ALBUMIN UR-MCNC: 100 MG/DL
ALP SERPL-CCNC: 110 U/L (ref 40–150)
ALT SERPL W P-5'-P-CCNC: 82 U/L (ref 0–70)
ANION GAP SERPL CALCULATED.3IONS-SCNC: 5 MMOL/L (ref 3–14)
ANION GAP SERPL CALCULATED.3IONS-SCNC: 6 MMOL/L (ref 3–14)
ANTIBODY SCREEN: NEGATIVE
APPEARANCE UR: ABNORMAL
APPEARANCE UR: ABNORMAL
AST SERPL W P-5'-P-CCNC: 45 U/L (ref 0–45)
BASOPHILS # BLD AUTO: 0 10E3/UL (ref 0–0.2)
BASOPHILS NFR BLD AUTO: 0 %
BILIRUB SERPL-MCNC: 0.7 MG/DL (ref 0.2–1.3)
BILIRUB UR QL STRIP: ABNORMAL
BILIRUB UR QL STRIP: NEGATIVE
BUN SERPL-MCNC: 19 MG/DL (ref 7–30)
BUN SERPL-MCNC: 23 MG/DL (ref 7–30)
CALCIUM SERPL-MCNC: 9 MG/DL (ref 8.5–10.1)
CALCIUM SERPL-MCNC: 9.8 MG/DL (ref 8.5–10.1)
CHLORIDE BLD-SCNC: 102 MMOL/L (ref 94–109)
CHLORIDE BLD-SCNC: 102 MMOL/L (ref 94–109)
CO2 SERPL-SCNC: 26 MMOL/L (ref 20–32)
CO2 SERPL-SCNC: 27 MMOL/L (ref 20–32)
COLOR UR AUTO: ABNORMAL
COLOR UR AUTO: ABNORMAL
CREAT SERPL-MCNC: 1.9 MG/DL (ref 0.66–1.25)
CREAT SERPL-MCNC: 2.02 MG/DL (ref 0.66–1.25)
EOSINOPHIL # BLD AUTO: 0 10E3/UL (ref 0–0.7)
EOSINOPHIL NFR BLD AUTO: 0 %
ERYTHROCYTE [DISTWIDTH] IN BLOOD BY AUTOMATED COUNT: 14.6 % (ref 10–15)
GFR SERPL CREATININE-BSD FRML MDRD: 30 ML/MIN/1.73M2
GFR SERPL CREATININE-BSD FRML MDRD: 33 ML/MIN/1.73M2
GLUCOSE BLD-MCNC: 156 MG/DL (ref 70–99)
GLUCOSE BLD-MCNC: 199 MG/DL (ref 70–99)
GLUCOSE UR STRIP-MCNC: NEGATIVE MG/DL
GLUCOSE UR STRIP-MCNC: NEGATIVE MG/DL
GRAN CASTS #/AREA URNS LPF: ABNORMAL /LPF
HCT VFR BLD AUTO: 30.1 % (ref 40–53)
HGB BLD-MCNC: 9.4 G/DL (ref 13.3–17.7)
HGB UR QL STRIP: ABNORMAL
HGB UR QL STRIP: ABNORMAL
HOLD SPECIMEN: NORMAL
HOLD SPECIMEN: NORMAL
IMM GRANULOCYTES # BLD: 0 10E3/UL
IMM GRANULOCYTES NFR BLD: 0 %
KETONES UR STRIP-MCNC: NEGATIVE MG/DL
KETONES UR STRIP-MCNC: NEGATIVE MG/DL
LEUKOCYTE ESTERASE UR QL STRIP: NEGATIVE
LEUKOCYTE ESTERASE UR QL STRIP: NEGATIVE
LYMPHOCYTES # BLD AUTO: 1.8 10E3/UL (ref 0.8–5.3)
LYMPHOCYTES NFR BLD AUTO: 21 %
MCH RBC QN AUTO: 29.7 PG (ref 26.5–33)
MCHC RBC AUTO-ENTMCNC: 31.2 G/DL (ref 31.5–36.5)
MCV RBC AUTO: 95 FL (ref 78–100)
MONOCYTES # BLD AUTO: 0.5 10E3/UL (ref 0–1.3)
MONOCYTES NFR BLD AUTO: 5 %
MUCOUS THREADS #/AREA URNS LPF: PRESENT /LPF
NEUTROPHILS # BLD AUTO: 6.1 10E3/UL (ref 1.6–8.3)
NEUTROPHILS NFR BLD AUTO: 74 %
NITRATE UR QL: NEGATIVE
NITRATE UR QL: NEGATIVE
NRBC # BLD AUTO: 0 10E3/UL
NRBC BLD AUTO-RTO: 0 /100
PH UR STRIP: 5 [PH] (ref 5–7)
PH UR STRIP: 5.5 [PH] (ref 5–7)
PLATELET # BLD AUTO: 72 10E3/UL (ref 150–450)
POTASSIUM BLD-SCNC: 4.2 MMOL/L (ref 3.4–5.3)
POTASSIUM BLD-SCNC: 4.9 MMOL/L (ref 3.4–5.3)
PROT SERPL-MCNC: 7 G/DL (ref 6.8–8.8)
RBC # BLD AUTO: 3.17 10E6/UL (ref 4.4–5.9)
RBC #/AREA URNS AUTO: >100 /HPF
RBC URINE: >182 /HPF
SODIUM SERPL-SCNC: 134 MMOL/L (ref 133–144)
SODIUM SERPL-SCNC: 134 MMOL/L (ref 133–144)
SP GR UR STRIP: 1.03 (ref 1–1.03)
SP GR UR STRIP: >=1.03 (ref 1–1.03)
SPECIMEN EXPIRATION DATE: NORMAL
SQUAMOUS #/AREA URNS AUTO: ABNORMAL /LPF
SQUAMOUS EPITHELIAL: <1 /HPF
TROPONIN I SERPL-MCNC: <0.015 UG/L (ref 0–0.04)
UROBILINOGEN UR STRIP-ACNC: 0.2 E.U./DL
UROBILINOGEN UR STRIP-MCNC: NORMAL MG/DL
WBC # BLD AUTO: 8.5 10E3/UL (ref 4–11)
WBC #/AREA URNS AUTO: ABNORMAL /HPF
WBC URINE: 1 /HPF

## 2021-09-26 PROCEDURE — 84484 ASSAY OF TROPONIN QUANT: CPT | Performed by: EMERGENCY MEDICINE

## 2021-09-26 PROCEDURE — 258N000003 HC RX IP 258 OP 636: Performed by: PHYSICIAN ASSISTANT

## 2021-09-26 PROCEDURE — U0003 INFECTIOUS AGENT DETECTION BY NUCLEIC ACID (DNA OR RNA); SEVERE ACUTE RESPIRATORY SYNDROME CORONAVIRUS 2 (SARS-COV-2) (CORONAVIRUS DISEASE [COVID-19]), AMPLIFIED PROBE TECHNIQUE, MAKING USE OF HIGH THROUGHPUT TECHNOLOGIES AS DESCRIBED BY CMS-2020-01-R: HCPCS | Performed by: PHYSICIAN ASSISTANT

## 2021-09-26 PROCEDURE — 85025 COMPLETE CBC W/AUTO DIFF WBC: CPT | Performed by: EMERGENCY MEDICINE

## 2021-09-26 PROCEDURE — 96361 HYDRATE IV INFUSION ADD-ON: CPT

## 2021-09-26 PROCEDURE — 85025 COMPLETE CBC W/AUTO DIFF WBC: CPT | Performed by: PHYSICIAN ASSISTANT

## 2021-09-26 PROCEDURE — 84295 ASSAY OF SERUM SODIUM: CPT | Performed by: EMERGENCY MEDICINE

## 2021-09-26 PROCEDURE — 250N000009 HC RX 250: Performed by: PHYSICIAN ASSISTANT

## 2021-09-26 PROCEDURE — 74177 CT ABD & PELVIS W/CONTRAST: CPT

## 2021-09-26 PROCEDURE — 36415 COLL VENOUS BLD VENIPUNCTURE: CPT | Performed by: STUDENT IN AN ORGANIZED HEALTH CARE EDUCATION/TRAINING PROGRAM

## 2021-09-26 PROCEDURE — 81001 URINALYSIS AUTO W/SCOPE: CPT | Performed by: PHYSICIAN ASSISTANT

## 2021-09-26 PROCEDURE — 80053 COMPREHEN METABOLIC PANEL: CPT | Performed by: STUDENT IN AN ORGANIZED HEALTH CARE EDUCATION/TRAINING PROGRAM

## 2021-09-26 PROCEDURE — 81001 URINALYSIS AUTO W/SCOPE: CPT | Performed by: STUDENT IN AN ORGANIZED HEALTH CARE EDUCATION/TRAINING PROGRAM

## 2021-09-26 PROCEDURE — C9803 HOPD COVID-19 SPEC COLLECT: HCPCS

## 2021-09-26 PROCEDURE — 85652 RBC SED RATE AUTOMATED: CPT | Performed by: EMERGENCY MEDICINE

## 2021-09-26 PROCEDURE — 36415 COLL VENOUS BLD VENIPUNCTURE: CPT | Performed by: EMERGENCY MEDICINE

## 2021-09-26 PROCEDURE — 250N000011 HC RX IP 250 OP 636: Performed by: PHYSICIAN ASSISTANT

## 2021-09-26 PROCEDURE — 93005 ELECTROCARDIOGRAM TRACING: CPT

## 2021-09-26 PROCEDURE — 36415 COLL VENOUS BLD VENIPUNCTURE: CPT | Performed by: PHYSICIAN ASSISTANT

## 2021-09-26 PROCEDURE — 74176 CT ABD & PELVIS W/O CONTRAST: CPT | Mod: XU

## 2021-09-26 PROCEDURE — 86140 C-REACTIVE PROTEIN: CPT | Performed by: EMERGENCY MEDICINE

## 2021-09-26 PROCEDURE — 99285 EMERGENCY DEPT VISIT HI MDM: CPT | Mod: 25

## 2021-09-26 PROCEDURE — 86900 BLOOD TYPING SEROLOGIC ABO: CPT | Performed by: PHYSICIAN ASSISTANT

## 2021-09-26 PROCEDURE — 99215 OFFICE O/P EST HI 40 MIN: CPT | Performed by: STUDENT IN AN ORGANIZED HEALTH CARE EDUCATION/TRAINING PROGRAM

## 2021-09-26 RX ORDER — ONDANSETRON 4 MG/1
4 TABLET, ORALLY DISINTEGRATING ORAL EVERY 8 HOURS PRN
Qty: 12 TABLET | Refills: 0 | Status: SHIPPED | OUTPATIENT
Start: 2021-09-26 | End: 2021-09-27

## 2021-09-26 RX ORDER — IOPAMIDOL 755 MG/ML
500 INJECTION, SOLUTION INTRAVASCULAR ONCE
Status: COMPLETED | OUTPATIENT
Start: 2021-09-26 | End: 2021-09-26

## 2021-09-26 RX ADMIN — SODIUM CHLORIDE 60 ML: 9 INJECTION, SOLUTION INTRAVENOUS at 22:15

## 2021-09-26 RX ADMIN — IOPAMIDOL 80 ML: 755 INJECTION, SOLUTION INTRAVENOUS at 22:15

## 2021-09-26 RX ADMIN — SODIUM CHLORIDE 1000 ML: 9 INJECTION, SOLUTION INTRAVENOUS at 20:15

## 2021-09-26 NOTE — PROGRESS NOTES
SUBJECTIVE:  Austin Garza is an 81 year old male who presents for blood in urine.  Patient has had blood in his urine starting this morning.  Has also had R flank pain/back pain starting yesterday after going for a walk.  Wondering if he has been walking too much.  Has had some nausea and dry heaving with a very small amount of emesis.  No blood or black substances in his emesis.  He has especially bad pain on the R posterior back area.  It kept him awake overnight and took some Tylenol to help with sleep.  Urinated blood today.  Has no history of kidney stones.  Started taking Flomax a week ago.      PMH:   has a past medical history of AAA (abdominal aortic aneurysm), BCC (basal cell carcinoma of skin) - face, CLL (chronic lymphocytic leukemia), Diaphragmatic hernia, Diverticulitis of colon, Esophageal reflux, Essential hypertension, Hyperlipidemia, Irritable bowel syndrome, Macular degeneration (senile) of retina, Mumps, Squamous Cell Carcinoma, Back (1988), and Type 2 diabetes mellitus.  Patient Active Problem List   Diagnosis     Hearing loss     Dysfunction of eustachian tube     Hypertension goal BP (blood pressure) < 140/90     Diminished Peripheral Pulse     Type 2 diabetes mellitus with hypoglycemia without coma, without long-term current use of insulin (H)     HYPERLIPIDEMIA LDL GOAL <100     Inflammatory Monoarthritis, Left Hip     Esophageal reflux     Overweight - BMI >35     BCC (basal cell carcinoma), face     Skin lesion of back     CLL (chronic lymphocytic leukemia) (H)     Abdominal aortic aneurysm (AAA) without rupture (H)     Anemia     Elevated prostate specific antigen (PSA)     CKD (chronic kidney disease) stage 3, GFR 30-59 ml/min     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)     Hypoglycemia     Bilateral incipient cataracts     Artery dissection (H)     Atrial fibrillation, unspecified type (H)     Chest pain, unspecified type     Acute midline thoracic back pain     Cervical pain      Social History     Socioeconomic History     Marital status:      Spouse name: librado     Number of children: 0     Years of education: 17     Highest education level: None   Occupational History     Occupation: retired   Tobacco Use     Smoking status: Former Smoker     Packs/day: 0.50     Years: 20.00     Pack years: 10.00     Quit date: 1975     Years since quittin.7     Smokeless tobacco: Former User   Substance and Sexual Activity     Alcohol use: Yes     Alcohol/week: 10.0 - 14.0 standard drinks     Types: 10 - 14 Standard drinks or equivalent per week     Comment: 3 drinks a day/wine     Drug use: No     Sexual activity: Never   Other Topics Concern     Parent/sibling w/ CABG, MI or angioplasty before 65F 55M? Not Asked   Social History Narrative     None     Social Determinants of Health     Financial Resource Strain:      Difficulty of Paying Living Expenses:    Food Insecurity:      Worried About Running Out of Food in the Last Year:      Ran Out of Food in the Last Year:    Transportation Needs:      Lack of Transportation (Medical):      Lack of Transportation (Non-Medical):    Physical Activity:      Days of Exercise per Week:      Minutes of Exercise per Session:    Stress:      Feeling of Stress :    Social Connections:      Frequency of Communication with Friends and Family:      Frequency of Social Gatherings with Friends and Family:      Attends Muslim Services:      Active Member of Clubs or Organizations:      Attends Club or Organization Meetings:      Marital Status:    Intimate Partner Violence: Not At Risk     Fear of Current or Ex-Partner: No     Emotionally Abused: No     Physically Abused: No     Sexually Abused: No     Family History   Problem Relation Age of Onset     Cerebrovascular Disease Father         x 2     Hypertension Mother      C.A.D. Mother      Cancer Mother         skin     Eye Disorder Mother         glaucoma     Prostate Cancer No family hx of       Cancer - colorectal No family hx of      Glaucoma No family hx of      Macular Degeneration No family hx of        ALLERGIES:  Ace inhibitors    Current Outpatient Medications   Medication     apixaban ANTICOAGULANT (ELIQUIS) 5 MG tablet     blood glucose monitoring (NO BRAND SPECIFIED) meter device kit     clopidogrel (PLAVIX) 75 MG tablet     CONTOUR NEXT TEST test strip     glimepiride (AMARYL) 1 MG tablet     insulin glargine (LANTUS PEN) 100 UNIT/ML pen     insulin pen needle (BD SHRUTHI U/F) 32G X 4 MM miscellaneous     metFORMIN (GLUCOPHAGE-XR) 500 MG 24 hr tablet     Multiple Vitamins-Minerals (CENTRUM SILVER) per tablet     ondansetron (ZOFRAN-ODT) 4 MG ODT tab     rosuvastatin (CRESTOR) 20 MG tablet     tamsulosin (FLOMAX) 0.4 MG capsule     No current facility-administered medications for this visit.         ROS:  ROS is done and is negative for general/constitutional, eye, ENT, Respiratory, cardiovascular, GI, , Skin, musculoskeletal except as noted elsewhere.  All other review of systems negative except as noted elsewhere.    OBJECTIVE:  /72   Pulse 99   Temp 98.4  F (36.9  C) (Tympanic)   SpO2 100%   GENERAL APPEARANCE: Alert, in no acute distress.  EYES: Conjunctivae clear.  EARS: External ears normal.  NOSE: Normal, no drainage.  OROPHARYNX: MMM.  NECK: Supple, symmetrical.  RESP: Clear to auscultation bilaterally, no wheezing or retractions, no increased effort.  CV: Regular rate and rhythm, no murmurs, good capillary refill.  ABDOMEN: Soft, very mild tenderness in RLQ without guarding, nondistended.  No CVA tenderness.  SKIN: No ulcers, lesions or rash.  MUSCULOSKELETAL: No gross deformities.  NEURO: No gross deficits, CN 2-12 grossly intact.    RESULTS  Results for orders placed or performed in visit on 09/26/21   UA reflex to Microscopic and Culture     Status: Abnormal    Specimen: Urine, Clean Catch   Result Value Ref Range    Color Urine Brown (A) Colorless, Straw, Light Yellow, Yellow     Appearance Urine Cloudy (A) Clear    Glucose Urine Negative Negative mg/dL    Bilirubin Urine Small (A) Negative    Ketones Urine Negative Negative mg/dL    Specific Gravity Urine >=1.030 1.003 - 1.035    Blood Urine Large (A) Negative    pH Urine 5.0 5.0 - 7.0    Protein Albumin Urine 100  (A) Negative mg/dL    Urobilinogen Urine 0.2 0.2, 1.0 E.U./dL    Nitrite Urine Negative Negative    Leukocyte Esterase Urine Negative Negative   Urine Microscopic     Status: Abnormal   Result Value Ref Range    RBC Urine >100 (A) 0-2 /HPF /HPF    WBC Urine 0-5 0-5 /HPF /HPF    Squamous Epithelials Urine Few (A) None Seen /LPF    Granular Casts Urine 0-2 (A) None Seen /LPF    Narrative    Urine Culture not indicated   Basic metabolic panel  (Ca, Cl, CO2, Creat, Gluc, K, Na, BUN)     Status: Abnormal   Result Value Ref Range    Sodium 134 133 - 144 mmol/L    Potassium 4.9 3.4 - 5.3 mmol/L    Chloride 102 94 - 109 mmol/L    Carbon Dioxide (CO2) 26 20 - 32 mmol/L    Anion Gap 6 3 - 14 mmol/L    Urea Nitrogen 19 7 - 30 mg/dL    Creatinine 1.90 (H) 0.66 - 1.25 mg/dL    Calcium 9.8 8.5 - 10.1 mg/dL    Glucose 199 (H) 70 - 99 mg/dL    GFR Estimate 33 (L) >60 mL/min/1.73m2     No results found for this or any previous visit (from the past 48 hour(s)).    ASSESSMENT/PLAN:  (R10.9) Flank pain  (primary encounter diagnosis)  Comment: Patient's presentation is most concerning for nephrolithiasis.  Unfortunately, as it is a Sunday and our facility does not have imaging capability we will have to wait on obtaining a CT scan to confirm this.  Will obtain CBC and BMP today to check his kidney function and see if there is any concern for systemic infection although I think this is a low likelihood as he is afebrile, nontoxic-appearing, and denies any other systemic symptoms.  I think appendicitis is very unlikely given his overall benign abdominal exam and lack of systemic symptoms.  CBC was unfortunately unable to be obtained due to lab  error.  BMP today showed that his creatinine has bumped up to 1.90, and as such I think it would be the most prudent for him to have imaging done today to make sure we are not missing an obstructing stone which could cause damage to his remaining kidney function as he already has chronic kidney disease.  Called the patient and informed him of these results and recommended he go to the ED.  Patient verbalized understanding and will go to [facility] emergency department.  Called [facility] ED and handed off the patient.  Plan: As above.      (R11.0) Nausea  Comment: We will try some Zofran for nausea.  Plan: ondansetron (ZOFRAN-ODT) 4 MG ODT tab          (R31.0) Gross hematuria  Comment: Concern for kidney stone as above.  Plan: As above.    PPE worn: N95, goggles.    Options for treatment and follow-up care were reviewed with the patient and/or guardian. Austin Garza and/or guardian engaged in the decision making process and verbalized understanding of the options discussed and agreed with the final plan.    See [facility] for orders, medications, letters, patient instructions    Roverto Jay MD

## 2021-09-26 NOTE — TELEPHONE ENCOUNTER
Pt states blood in urine started in the past 30 min.  Pt developed lower right quadrant abdominal pain yesterday with dry heaves and vomiting.  Today pain in right flank area.   Pt intends to go to  now.  Gricelda Ayala RN, [facility] Nurse Advisor      Reason for Disposition    Side (flank) or back pain present    Additional Information    Negative: Shock suspected (e.g., cold/pale/clammy skin, too weak to stand, low BP, rapid pulse)    Negative: Sounds like a life-threatening emergency to the triager    Negative: Urinary catheter, questions about    Protocols used: URINE - BLOOD IN-A-

## 2021-09-26 NOTE — ED TRIAGE NOTES
Pt noticed blood in urine this morning. Went to [facility] and was told to come in to ED for further w/u on possible kidney stone. Reports right flank pain as well. ABCs intact. A&Ox3.

## 2021-09-26 NOTE — ED TRIAGE NOTES
Pt reports pt is on blood thinners and has a procedure coming up on Tuesday for cardioversion. Pty denies any symptoms in this matter.

## 2021-09-27 ENCOUNTER — APPOINTMENT (OUTPATIENT)
Dept: CARDIOLOGY | Facility: CLINIC | Age: 81
End: 2021-09-27
Attending: PHYSICIAN ASSISTANT
Payer: COMMERCIAL

## 2021-09-27 VITALS
OXYGEN SATURATION: 99 % | SYSTOLIC BLOOD PRESSURE: 170 MMHG | TEMPERATURE: 97.9 F | RESPIRATION RATE: 20 BRPM | HEART RATE: 114 BPM | DIASTOLIC BLOOD PRESSURE: 89 MMHG

## 2021-09-27 LAB
ATRIAL RATE - MUSE: 102 BPM
CRP SERPL-MCNC: 116 MG/L (ref 0–8)
DIASTOLIC BLOOD PRESSURE - MUSE: NORMAL MMHG
ERYTHROCYTE [SEDIMENTATION RATE] IN BLOOD BY WESTERGREN METHOD: 51 MM/HR (ref 0–20)
GLUCOSE BLDC GLUCOMTR-MCNC: 159 MG/DL (ref 70–99)
GLUCOSE BLDC GLUCOMTR-MCNC: 175 MG/DL (ref 70–99)
GLUCOSE BLDC GLUCOMTR-MCNC: 222 MG/DL (ref 70–99)
INTERPRETATION ECG - MUSE: NORMAL
LVEF ECHO: NORMAL
P AXIS - MUSE: 45 DEGREES
PR INTERVAL - MUSE: 184 MS
QRS DURATION - MUSE: 74 MS
QT - MUSE: 344 MS
QTC - MUSE: 448 MS
R AXIS - MUSE: -10 DEGREES
SARS-COV-2 RNA RESP QL NAA+PROBE: NEGATIVE
SYSTOLIC BLOOD PRESSURE - MUSE: NORMAL MMHG
T AXIS - MUSE: 140 DEGREES
VENTRICULAR RATE- MUSE: 102 BPM

## 2021-09-27 PROCEDURE — 255N000002 HC RX 255 OP 636: Performed by: EMERGENCY MEDICINE

## 2021-09-27 PROCEDURE — 96374 THER/PROPH/DIAG INJ IV PUSH: CPT | Mod: 59

## 2021-09-27 PROCEDURE — 250N000012 HC RX MED GY IP 250 OP 636 PS 637: Performed by: EMERGENCY MEDICINE

## 2021-09-27 PROCEDURE — 96361 HYDRATE IV INFUSION ADD-ON: CPT

## 2021-09-27 PROCEDURE — 99214 OFFICE O/P EST MOD 30 MIN: CPT | Mod: 25 | Performed by: INTERNAL MEDICINE

## 2021-09-27 PROCEDURE — 258N000003 HC RX IP 258 OP 636: Performed by: EMERGENCY MEDICINE

## 2021-09-27 PROCEDURE — 96375 TX/PRO/DX INJ NEW DRUG ADDON: CPT

## 2021-09-27 PROCEDURE — 93308 TTE F-UP OR LMTD: CPT | Mod: 26 | Performed by: INTERNAL MEDICINE

## 2021-09-27 PROCEDURE — 250N000013 HC RX MED GY IP 250 OP 250 PS 637: Performed by: EMERGENCY MEDICINE

## 2021-09-27 PROCEDURE — 93325 DOPPLER ECHO COLOR FLOW MAPG: CPT | Mod: 26 | Performed by: INTERNAL MEDICINE

## 2021-09-27 PROCEDURE — 250N000011 HC RX IP 250 OP 636: Performed by: EMERGENCY MEDICINE

## 2021-09-27 PROCEDURE — 96372 THER/PROPH/DIAG INJ SC/IM: CPT | Mod: 59 | Performed by: EMERGENCY MEDICINE

## 2021-09-27 PROCEDURE — 93321 DOPPLER ECHO F-UP/LMTD STD: CPT | Mod: 26 | Performed by: INTERNAL MEDICINE

## 2021-09-27 PROCEDURE — 93321 DOPPLER ECHO F-UP/LMTD STD: CPT

## 2021-09-27 RX ORDER — METFORMIN HCL 500 MG
1000 TABLET, EXTENDED RELEASE 24 HR ORAL DAILY
Status: DISCONTINUED | OUTPATIENT
Start: 2021-09-27 | End: 2021-09-27 | Stop reason: HOSPADM

## 2021-09-27 RX ORDER — HYDROMORPHONE HYDROCHLORIDE 1 MG/ML
0.5 INJECTION, SOLUTION INTRAMUSCULAR; INTRAVENOUS; SUBCUTANEOUS
Status: DISCONTINUED | OUTPATIENT
Start: 2021-09-27 | End: 2021-09-27 | Stop reason: HOSPADM

## 2021-09-27 RX ORDER — GLIMEPIRIDE 1 MG/1
1 TABLET ORAL
Status: DISCONTINUED | OUTPATIENT
Start: 2021-09-27 | End: 2021-09-27 | Stop reason: HOSPADM

## 2021-09-27 RX ORDER — ONDANSETRON 2 MG/ML
4 INJECTION INTRAMUSCULAR; INTRAVENOUS EVERY 6 HOURS PRN
Status: DISCONTINUED | OUTPATIENT
Start: 2021-09-27 | End: 2021-09-27 | Stop reason: HOSPADM

## 2021-09-27 RX ADMIN — SODIUM CHLORIDE 1000 ML: 9 INJECTION, SOLUTION INTRAVENOUS at 12:16

## 2021-09-27 RX ADMIN — ONDANSETRON HYDROCHLORIDE 4 MG: 2 INJECTION, SOLUTION INTRAMUSCULAR; INTRAVENOUS at 05:12

## 2021-09-27 RX ADMIN — GLIMEPIRIDE 1 MG: 1 TABLET ORAL at 12:32

## 2021-09-27 RX ADMIN — METFORMIN ER 500 MG 1000 MG: 500 TABLET ORAL at 12:32

## 2021-09-27 RX ADMIN — INSULIN GLARGINE 22 UNITS: 100 INJECTION, SOLUTION SUBCUTANEOUS at 12:33

## 2021-09-27 RX ADMIN — HYDROMORPHONE HYDROCHLORIDE 0.5 MG: 1 INJECTION, SOLUTION INTRAMUSCULAR; INTRAVENOUS; SUBCUTANEOUS at 05:12

## 2021-09-27 RX ADMIN — HUMAN ALBUMIN MICROSPHERES AND PERFLUTREN 3 ML: 10; .22 INJECTION, SOLUTION INTRAVENOUS at 03:23

## 2021-09-27 NOTE — ED PROVIDER NOTES
Emergency Department Attending Supervision Note  9/26/2021  9:26 PM      I evaluated this patient in conjunction with Silverio Hernandez PA-C      Briefly, the patient presented with flank pain and hematuria.  The patient is on Eliquis.  Additionally, he is scheduled for cardioversion on Tuesday due to his history of A. Fib.    At the time of my evaluation, the results from his noncontrast CT abdomen pelvis were available and while he did note a ureteral stone, it also noted the finding of a pericardial effusion that was fairly large.  The patient's blood pressure, at the time evaluation, seems otherwise normal at 111/76 though the patient himself does note he ha some degree of shortness of breath even going back as far as the end of July or early August.  Reviews of imaging studies from early August and even the CT aortic survey at the end of August do not note any pericardial effusion at that point and no endovascular leak either.      On my exam,     Patient Vitals for the past 24 hrs:   BP Temp Temp src Pulse Resp SpO2   09/26/21 2330 (!) 156/91 -- -- 102 -- 97 %   09/26/21 2315 (!) 163/85 -- -- 101 -- 98 %   09/26/21 2300 (!) 156/84 -- -- 98 -- 95 %   09/26/21 2230 -- -- -- 103 -- --   09/26/21 2200 -- -- -- 102 -- 98 %   09/26/21 2130 137/87 -- -- 101 -- 98 %   09/26/21 2115 107/72 -- -- 101 -- 97 %   09/26/21 2100 111/76 -- -- 101 -- 98 %   09/26/21 1723 (!) 154/92 99  F (37.2  C) Temporal 110 18 98 %       Constitutional: Vital signs reviewed as above.   Head: No external signs of trauma noted.  Eyes: Pupils are equal, round, and reactive to light.   Neck: No JVD noted  Cardiovascular: Tachycardic rate, regular rhythm and normal heart sounds.  No murmur heard. Equal B/L peripheral pulses.  Pulmonary/Chest: Effort normal and breath sounds normal. No respiratory distress. Patient has no wheezes. Patient has no rales.   Gastrointestinal: Soft. There is no tenderness.   Musculoskeletal/Extremities: No edema noted.  Normal tone.  Neurological: Patient is alert and oriented to person, place, and time.   Skin: Skin is warm and dry. There is no diaphoresis noted.   Psychiatric: The patient appears calm.    Results:    Abd/pelvis CT no contrast - Stone Protocol   Final Result   IMPRESSION:    1.  Right mid to distal ureteral calculus appears to be causing at least partial obstruction. There may also be a superimposed pyelonephritis given the inflammatory changes around the ureter and around the right kidney.   2.  New moderate sized pericardial effusion.   3.  Postoperative changes status post aortic artery aneurysm repair. The abdominal aortic aneurysm measures up to approximately 7.6 cm in greatest transaxial dimension. This is not appreciably changed in size, given slight changes in technique. The    aneurysm is difficult to evaluate due to streak artifact from the prior operative procedures.   4.  Cholelithiasis.   5.  Colonic diverticulosis without evidence for acute diverticulitis.   6.  Appendix is not seen and cannot be evaluated. No obvious evidence of acute appendicitis is seen.   7.  Borderline splenomegaly.   8.  Evaluation of the solid organs and of the vascularity of the abdomen and pelvis is limited due to lack of IV contrast.      I discussed the right ureteral calculus with associated inflammatory changes and the new moderate-sized pericardial effusion with Dr. Hernandez on 09/26/2021 at approximately 8:58 PM.      CT Aortic Survey w Contrast    (Results Pending)       Labs Ordered and Resulted from Time of ED Arrival Up to the Time of Departure from the ED   COMPREHENSIVE METABOLIC PANEL - Abnormal; Notable for the following components:       Result Value    Creatinine 2.02 (*)     Glucose 156 (*)     ALT 82 (*)     Albumin 3.1 (*)     GFR Estimate 30 (*)     All other components within normal limits   ROUTINE UA WITH MICROSCOPIC REFLEX TO CULTURE - Abnormal; Notable for the following components:    Color Urine Orange  (*)     Appearance Urine Slightly Cloudy (*)     Blood Urine Large (*)     Protein Albumin Urine 100  (*)     Mucus Urine Present (*)     RBC Urine >182 (*)     All other components within normal limits    Narrative:     Urine Culture not indicated   CBC WITH PLATELETS AND DIFFERENTIAL - Abnormal; Notable for the following components:    RBC Count 3.17 (*)     Hemoglobin 9.4 (*)     Hematocrit 30.1 (*)     MCHC 31.2 (*)     Platelet Count 72 (*)     All other components within normal limits   CRP INFLAMMATION - Abnormal; Notable for the following components:    CRP Inflammation 116.0 (*)     All other components within normal limits   TROPONIN I - Normal   EXTRA BLUE TOP TUBE   EXTRA RED TOP TUBE   COVID-19 VIRUS (CORONAVIRUS) BY PCR   ERYTHROCYTE SEDIMENTATION RATE AUTO   TYPE AND SCREEN, ADULT   CBC WITH PLATELETS & DIFFERENTIAL    Narrative:     The following orders were created for panel order CBC + differential.  Procedure                               Abnormality         Status                     ---------                               -----------         ------                     CBC with platelets and d...[[id number]]  Abnormal            Final result                 Please view results for these tests on the individual orders.   EXTRA TUBE    Narrative:     The following orders were created for panel order Extra Tube ([place] Draw).  Procedure                               Abnormality         Status                     ---------                               -----------         ------                     Extra Blue Top Tube[[id number]]                              Final result               Extra Red Top Tube[[id number]]                               Final result                 Please view results for these tests on the individual orders.   ABO/RH TYPE AND SCREEN    Narrative:     The following orders were created for panel order ABO/Rh type and screen.  Procedure                               Abnormality          Status                     ---------                               -----------         ------                     Adult Type and Screen[[id number]]                            Edited Result - FINAL        Please view results for these tests on the individual orders.       ED course:    ECG:  ECG Taken at 21:34 on 9/26/21    Sinus tachycardia  Inferior infarct, age undetermined  T-wave abnormality, consider lateral ischemia  Abnormal EKG  Rate: 102. IN: 184. QRS: 74. QT/QTc: 344/448.  P-R-T Axes:   45   -10   140  Compared with the prior ECG dated 8/23/2021:  T wave inversion is noted in leads I, aVL, V5, and V6  The previously noted T wave inversion in lead III has resolved  There are flatter T waves noted in V2 through V4  Interpreted by me at 2146 on 9/26/2021      Medications   0.9% sodium chloride BOLUS (0 mLs Intravenous Stopped 9/26/21 2340)   iopamidol (ISOVUE-370) solution 500 mL (80 mLs Intravenous Given 9/26/21 2215)   for CT scan flush use (60 mLs Intravenous Given 9/26/21 2215)       ED Course as of Sep 27 0018   Sun Sep 26, 2021   2111 Dr. Pillai discussed the case with Silverio Hernandez PA-C. Discussed presentation, exam, ddx, plan.          2114 Dr. Pillai' evaluation. Discussed plan for additional imaging and consultation with specialty services with patient and wife.      2301 D/W Silverio Hernandez PA-C. Reviewed CT result. Will discuss with cardiology.      2343 D/W Silverio Hernandez PA-C (after conversation with cardiology). Patient should be able to stay at [facility] if pericardiocentesis can be performed here.      Mon Sep 27, 2021   0006 [facility] Hospitalist declines admission. Will look for a bed at [facility] or perhaps [facility].      0007 No cardiac tele beds at [facility] or [facility]. We may need to look out of system. Patient was signed out to Dr. Ramirez.          Impression:    ICD-10-CM    1. Ureteral stone  N20.1    2. Pericardial effusion  I31.3    3. Acute kidney injury (H)  N17.9    4. Pericarditis  I31.9     Possible             Seun Pillai, DO  09/27/21 0008       Seun Pillai, DO  09/27/21 0018

## 2021-09-27 NOTE — ED NOTES
I resumed care of this patient from Dr. Ni.  Bed search is in process.  No complaints overnight.  Cardiology evaluated the patient this morning and an echocardiogram was done.  No convincing signs of tamponade.    ED Course as of Sep 27 1824   Sun Sep 26, 2021   2111 Dr. Pilali discussed the case with Silverio Hernandez PA-C. Discussed presentation, exam, ddx, plan.          2114 Dr. Pillai' evaluation. Discussed plan for additional imaging and consultation with specialty services with patient and wife.      2301 D/W Silverio Hernandez PA-C. Reviewed CT result. Will discuss with cardiology.      2343 D/W Silverio Hernandez PA-C (after conversation with cardiology). Patient should be able to stay at [facility] if pericardiocentesis can be performed here.      Mon Sep 27, 2021   0006 [facility] Hospitalist declines admission. Will look for a bed at [facility] or perhaps [facility].      0007 No cardiac tele beds at [facility] or [facility]. We may need to look out of system. Patient was signed out to Dr. Ramirez.      1524 Dr. Pillai Resumed care from Dr. Ni. Bes search still in progress. Diabetic meds ordered. Seen by cardiology, Echo reviewed. Recommend transferring to a facility with pericardiocentesis capabilities.      1706 Dr. Pillai D/W Dr. Schaffer (cardiology).      1711 Dr. Pillai updated the patient and wife. Heart tachycardic.      1812 D/W Dr. Pereira ([facility]). This will be an ED to ED transfer for rapid evaluation. Dr. Barrios will be the accepting ED physician.      1820 Updated patient.          Echo Limited   Final Result      CT Aortic Survey w Contrast   Final Result   IMPRESSION:   1.  Large pericardial effusion, new compared to 08/23/2021. There is mild pericardial enhancement suggesting pericarditis but no morphologic evidence for pericardial constriction.   2.  Otherwise, no acute abnormality in the chest.   3.  Moderate right obstructive uropathy secondary to a 2 mm stone in the distal right ureter, as seen on recent CT abdomen pelvis  without contrast 09/26/2021.   4.  Abdominal aortic aneurysm status post endovascular repair with aortobiiliac endograft. No definite change.   5.  Stable superior mesenteric artery dissection.   6.  Severe sigmoid diverticulosis.   7.  Cholelithiasis.         Abd/pelvis CT no contrast - Stone Protocol   Final Result   IMPRESSION:    1.  Right mid to distal ureteral calculus appears to be causing at least partial obstruction. There may also be a superimposed pyelonephritis given the inflammatory changes around the ureter and around the right kidney.   2.  New moderate sized pericardial effusion.   3.  Postoperative changes status post aortic artery aneurysm repair. The abdominal aortic aneurysm measures up to approximately 7.6 cm in greatest transaxial dimension. This is not appreciably changed in size, given slight changes in technique. The    aneurysm is difficult to evaluate due to streak artifact from the prior operative procedures.   4.  Cholelithiasis.   5.  Colonic diverticulosis without evidence for acute diverticulitis.   6.  Appendix is not seen and cannot be evaluated. No obvious evidence of acute appendicitis is seen.   7.  Borderline splenomegaly.   8.  Evaluation of the solid organs and of the vascularity of the abdomen and pelvis is limited due to lack of IV contrast.      I discussed the right ureteral calculus with associated inflammatory changes and the new moderate-sized pericardial effusion with Dr. Hernandez on 09/26/2021 at approximately 8:58 PM.        Meds:  Medications   HYDROmorphone (PF) (DILAUDID) injection 0.5 mg (0.5 mg Intravenous Given 9/27/21 0512)   ondansetron (ZOFRAN) injection 4 mg (4 mg Intravenous Given 9/27/21 0512)   glimepiride (AMARYL) tablet 1 mg (1 mg Oral Given 9/27/21 1232)   metFORMIN (GLUCOPHAGE-XR) 24 hr tablet 1,000 mg (1,000 mg Oral Given 9/27/21 1232)   insulin glargine (LANTUS) injection 22 Units (22 Units Subcutaneous Given 9/27/21 1233)   0.9% sodium chloride BOLUS  (0 mLs Intravenous Stopped 9/26/21 2340)   iopamidol (ISOVUE-370) solution 500 mL (80 mLs Intravenous Given 9/26/21 2215)   for CT scan flush use (60 mLs Intravenous Given 9/26/21 2215)   perflutren diluted 1mL to 2mL with saline (OPTISON) diluted injection 3 mL (3 mLs Intravenous Given 9/27/21 0323)   sodium chloride (PF) 0.9% PF flush 10 mL (10 mLs Intracatheter Given 9/27/21 0323)   0.9% sodium chloride BOLUS (0 mLs Intravenous Stopped 9/27/21 1754)           Impression:      ICD-10-CM    1. Ureteral stone  N20.1    2. Pericardial effusion  I31.3    3. Acute kidney injury (H)  N17.9    4. Pericarditis  I31.9     Possible          Seun Pillai,   09/27/21 9650

## 2021-09-27 NOTE — ED PROVIDER NOTES
1:30AM  I received patient in signout from Dr. Pillai and Silverio Hernandez regarding the patient.  Please refer to their complete H&P for further information.  Briefly, patient presented with flank pain and hematuria.  Found to have a renal calculi on CT as well as pericardial effusion.  Attempted to contact multiple hospital systems without success; [facility] hospitalist declined admission; At time of signout, formal echo pending to ensure no evidence of tamponade and confirm effusion.     Echo does confirm moderate pericardial effusion though inconclusive for cardiac tamponade.  Patient hemodynamically stable at this time.  I spoke to cardiothoracic surgery at [facility], Dr. Bronson, regarding the patient.  He stated that there is no need for cardiothoracic emergent capabilities at this point in time though recommended cardiology consultation in our ED.  I spoke to cardiologist, Dr. Ma, regarding the patient again.  He stated recommendation to have cardiology see the patient in our ED at [facility] to determine if pericardiocentesis is warranted urgently or can be scheduled outpatient.  If they are not capable of doing this in the a.m. to facilitate consideration of transfer to [facility] ED.  Patient updated on plan of care.  His pain was overall well controlled.       6:00 AM  Patient signed out to Dr. Ni.        Asia Ramirez,   09/27/21 0636

## 2021-09-27 NOTE — ED PROVIDER NOTES
History   Chief Complaint:  Hematuria       HPI   Austin Garza is a 81 year old male with complex past medical history including AAA status post repair, CLL, atrial fibrillation on Eliquis, enlarged prostate presents with right flank pain and hematuria.  The patient states that he began having right flank pain in the area of his kidney yesterday.  He denies prior history of kidney stones.  This morning noted his urine to be bloody and red.  He denies fevers or chills but has felt slightly nauseous.  No prior history of kidney stones.  No recent injuries.  He notes some shortness of breath but this has been ongoing since probably July.  No chest pain or dizziness.  He is on the Eliquis with planned cardioversion for A. fib on Tuesday.  Denies recent injuries.    Review of Systems   All other systems reviewed and are negative.    Allergies:  Ace Inhibitors    Medications:  Eliquis  Plavix  Amaryl  Insulin  Glucophage  Toprol  Crestor  Flomax    Past Medical History:    AAA  BCC  CLL  Hernia  Diverticulitis  GERD  HTN  HDL  IBS  Macular degeneration  Mumps  SCC  Type 2 diabetes  A fibrillation  Artery dissection  Bilateral incipient cataracts  Hypoglycemia  Anemia  Hearing loss     Past Surgical History:    Anesthesia cardioversion  Appendectomy  Bone marrow biopsy  Colonoscopy  Endovascular repair aneurysm  Fistulotomy  IR abdominal aortogram  Visceral embolization  Vasectomy  SCC resection    Family History:    Father: CVD  Mother: HTN, CAD, cancer, glaucoma      Social History:  Presents with wife  Denies smoking    Physical Exam     Patient Vitals for the past 24 hrs:   BP Temp Temp src Pulse Resp SpO2   09/27/21 0045 (!) 165/103 -- -- 105 -- 97 %   09/26/21 2330 (!) 156/91 -- -- 102 -- 97 %   09/26/21 2315 (!) 163/85 -- -- 101 -- 98 %   09/26/21 2300 (!) 156/84 -- -- 98 -- 95 %   09/26/21 2230 -- -- -- 103 -- --   09/26/21 2200 -- -- -- 102 -- 98 %   09/26/21 2130 137/87 -- -- 101 -- 98 %   09/26/21 2115  107/72 -- -- 101 -- 97 %   09/26/21 2100 111/76 -- -- 101 -- 98 %   09/26/21 1723 (!) 154/92 99  F (37.2  C) Temporal 110 18 98 %       Physical Exam  General: Awake, alert, pleasant, non-toxic.  Head:  Scalp is NC/AT  Eyes:  Conjunctiva normal, PERRL  ENT:  The external nose and ears are normal.   Neck:  Normal range of motion without rigidity.  CV:  Mildly tachycardic but regular.    No pathologic murmur, rubs, or gallops.  Resp:  Breath sounds are clear bilaterally.  No crackles, wheezes, rhonchi, stridor.    Non-labored, no retractions or accessory muscle use  Abdomen: Abdomen is soft, no distension, no tenderness, no masses. No CVA tenderness.  MS:  No lower extremity edema or asymmetric calf swelling. Normal ROM in all joints without effusions.  Skin:  Warm and dry, No rash or lesions noted.   Neuro: Alert and oriented x3.  No gross motor deficits.  No facial asymmetry.  Psych: Awake. Alert. Normal affect. Appropriate interactions.      Emergency Department Course   ECG:  ECG Taken at 21:34 on 9/26/21     Sinus tachycardia  Inferior infarct, age undetermined  T-wave abnormality, consider lateral ischemia  Abnormal EKG  Rate: 102. MN: 184. QRS: 74. QT/QTc: 344/448.  P-R-T Axes:   45   -10   140  Compared with the prior ECG dated 8/23/2021:  T wave inversion is noted in leads I, aVL, V5, and V6  The previously noted T wave inversion in lead III has resolved  There are flatter T waves noted in V2 through V4  Interpreted by me at 2146 on 9/26/2021.     Imaging:  CT Aortic Survey w Contrast   Final Result   IMPRESSION:   1.  Large pericardial effusion, new compared to 08/23/2021. There is mild pericardial enhancement suggesting pericarditis but no morphologic evidence for pericardial constriction.   2.  Otherwise, no acute abnormality in the chest.   3.  Moderate right obstructive uropathy secondary to a 2 mm stone in the distal right ureter, as seen on recent CT abdomen pelvis without contrast 09/26/2021.   4.   Abdominal aortic aneurysm status post endovascular repair with aortobiiliac endograft. No definite change.   5.  Stable superior mesenteric artery dissection.   6.  Severe sigmoid diverticulosis.   7.  Cholelithiasis.         Abd/pelvis CT no contrast - Stone Protocol   Final Result   IMPRESSION:    1.  Right mid to distal ureteral calculus appears to be causing at least partial obstruction. There may also be a superimposed pyelonephritis given the inflammatory changes around the ureter and around the right kidney.   2.  New moderate sized pericardial effusion.   3.  Postoperative changes status post aortic artery aneurysm repair. The abdominal aortic aneurysm measures up to approximately 7.6 cm in greatest transaxial dimension. This is not appreciably changed in size, given slight changes in technique. The    aneurysm is difficult to evaluate due to streak artifact from the prior operative procedures.   4.  Cholelithiasis.   5.  Colonic diverticulosis without evidence for acute diverticulitis.   6.  Appendix is not seen and cannot be evaluated. No obvious evidence of acute appendicitis is seen.   7.  Borderline splenomegaly.   8.  Evaluation of the solid organs and of the vascularity of the abdomen and pelvis is limited due to lack of IV contrast.      I discussed the right ureteral calculus with associated inflammatory changes and the new moderate-sized pericardial effusion with Dr. Hernandez on 09/26/2021 at approximately 8:58 PM.      Echocardiogram Complete    (Results Pending)       Laboratory:  CBC: WBC 8.5, HGB 9.4 (L), PLT 72 (L)     CMP: glucose: 156 (H), ALT: 82 (H), albumin: 3.1 (L), GFR: 30 (L) o/w WNL (Creatinine 2.02 (H))     UA with microscopic: color: orange (A), appearance: slightly cloudy (A), blood: large (A), protein albumin: 100 (A), mucus: present (A), RBC: >182 (H) o/w WNL     ABO/Rh type and screen: pending    Emergency Department Course:    Reviewed:  I reviewed nursing notes, vitals, past  medical history and care everywhere    Assessments:   I obtained history and examined the patient as noted above.    I rechecked the patient      Consults:    I spoke with Dr. Ma cardiology regarding the patient.   I spoke with DO mariana Kelley regarding the patient.  He declines admission at this time as he feels the patient needs to be admitted to the center with cardiothoracic capability.  No beds available at [facility], or [facility].   I again spoke with DO lulú Kelleyist who recommends consulting interventional radiology to see if they would be able to manage the patient at [facility] if pericardiocentesis was needed.  15 I spoke with Dr. Thibodeaux interventional radiologist.  He does not feel the patient could undergo pericardiocentesis at [facility] if needed tomorrow and recommends transfer to cardiothoracic hospital.  No beds available in the [facility], [facility], or [facility].    Interventions:  Medications   0.9% sodium chloride BOLUS (0 mLs Intravenous Stopped 21)   iopamidol (ISOVUE-370) solution 500 mL (80 mLs Intravenous Given 21)   for CT scan flush use (60 mLs Intravenous Given 21)     Disposition:  The patient will board in the emergency department pending bed placement. Care was signed out to Dr. Asia Ramirez.     Impression & Plan     Medical Decision Makin-year-old male presents with hematuria and flank pain.  CT scan demonstrates a 2 mm right-sided stone.  There is some associated inflammatory change, however urinalysis without any evidence of infection afebrile with normal white blood cell count and not consistent with infected stone.  This is the source of the patient's flank pain and hematuria.  Additionally noted is moderate to large new pericardial effusion since August.  Patient does report some ongoing shortness of  breath for some time.  Possible findings also suggestive of pericarditis on imaging though no definite findings on CT.  Troponin negative arguing against myocarditis.  Inflammatory markers elevated.  Vitally stable with slight borderline tachycardia though no evidence to suggest tamponade.  No evidence of AAA rupture or endovascular leak.  Patient to be transferred to Medical Center with cardiothoracic surgery capabilities to evaluate this further as it is new and the source of it is unclear.  Unfortunately there are no beds available in the [facility] area at a hospital with cardiothoracic surgery capability.  Additionally the patient has received all of his previous cardiac care at [facility] in the [facility].  Therefore at this time will obtain echocardiogram in the emergency department, will hold Eliquis per cardiology recommendation, patient will board in the ED pending bed availability at [facility] and was placed on the list.  Signed out to Dr. Asia Ramirez pending echo results and hopeful transfer in a.m.  Discussed plan with patient and his wife.    Covid-19  Austin Garza was evaluated during a global COVID-19 pandemic, which necessitated consideration that the patient might be at risk for infection with the SARS-CoV-2 virus that causes COVID-19.   Applicable protocols for evaluation were followed during the patient's care.   COVID-19 was considered as part of the patient's evaluation. The plan for testing is:  a test was obtained during this visit.    Diagnosis:    ICD-10-CM    1. Ureteral stone  N20.1    2. Pericardial effusion  I31.3    3. Acute kidney injury (H)  N17.9    4. Pericarditis  I31.9     Possible       Discharge Medications:  New Prescriptions    No medications on file       Scribe Disclosure:  January BLAIR, am serving as a scribe at 8:20 PM on 9/26/2021 to document services personally performed by Silverio Hernandez,  based on my observations and the  provider's statements to me.            Silverio Hernandez PA-C  09/27/21 0116

## 2021-09-27 NOTE — CONSULTS
Cardiology Consultation:    Austin Garza MRN#: [id number]   YOB: 1940 Age: 81 year old     Date of Admission: 9/26/2021  Consult indication: concern for cardiac tamponade         Assessment and Plan / Recommendations:      # Moderate pericardial effusion, echocardiographic findings suggestive of possible early hemodynamic compromise.  CHEYANNE 8/2/2021 did not demonstrate a pericardial effusion.  Clinically he does not seem to be in cardiac tamponade.  Elevated inflammatory markers could reflect pericarditis, however he denies any chest pain.  Currently he is normotensive if not slightly hypertensive, however he is also tachycardic.  Possible tachycardia as well as elevated inflammatory markers may be due to a noncardiac etiology (i.e. pyelonephritis?), as patient clinically does not seem to be symptomatic of his pericardial effusion.    Pericardial effusion is of unclear etiology, however patient has been on apixaban as well as clopidogrel, and hemoglobin has decreased recently, the patient has also had hematuria.    # Paroxysmal atrial fibrillation, [id number], on apixaban.  Status post CHEYANNE/DCCV 8/2/2021, however was found to be back in atrial fibrillation in clinic 9/17/2021, now in sinus tachycardia after apparent spontaneous conversion.    # HFrEF LVEF 45 to 50%.   # Presumed CAD.  CTT PE study 7/30/2021 and CT aorta study 8/23/2021 demonstrate significant coronary artery calcifications.  Nuclear stress test 8/24/2021 demonstrated nontransmural infarction in the apical and inferior segments with a mild degree of arnoldo-infarct ischemia.  Study was of low quality, artifact cannot be excluded.  Patient denies symptoms concerning for angina.  Offered cardiac catheterization/coronary angiography versus cardiac stress MRI at clinic visit 9/17/2021, declined by patient and his wife.  # HTN.  BP elevated,.  # HL  # CLL, followed by Dr. Perry, was previously on ibrutinib  # AAA s/p EVAR with   [place]   # DM2  # CKD    Overall clinical presentation does not seem characteristic of cardiac tamponade.  However the relatively subacute nature of the effusion (not present on CHEYANNE 8/2/2021), as well as echocardiographic findings suggesting possible early hemodynamic compromise, are concerning.  Recommend continued close monitoring with repeat limited TTE tomorrow.  Unfortunately this hospital does not have the capabilities for pericardiocentesis, and while this does not seem necessary at this time, recommend that he be transferred to a facility with these capabilities should they be required.  Given absence of chest pain, as well as NINA, would hold off on NSAID therapy for now, clinically does not seem consistent with pericarditis.   Recommend holding apixaban and clopidogrel for now, discussed with patient that holding apixaban would be associated with possible risk for stroke, however since there is concern for active bleeding, risks of continuing this likely outweigh the benefits.    Thank you for allowing our team to participate in the care of Austin Garza. Please do not hesitate to page me with any questions or concerns.    Raheel Schaffer MD, [facility]  Cardiology  Text Page   September 27, 2021    Voice recognition software utilized. Although reviewed after completion, some word and grammatical errors may be present.         History of Present Illness:     I had the opportunity to see patient Austin Garza at [facility] ED for a cardiology consultation.     As you know, patient is a pleasant 81-year-old male with a past medical history significant for AAA status post repair, hypertension, diabetes, CLL (on Plavix, was on ibrutinib until mid 8/2021), recently diagnosed atrial fibrillation with RVR s/p CHEYANNE/cardioversion (8/2/2021), heart failure with reduced ejection fraction (LVEF 45 to 50% TTE 7/2021), who presented to the ED on 9/26/2021 with hematuria secondary to right nephrolithiasis, found to  "have a new moderate pericardial effusion.    Patient is known to me from clinic.  I had initially met him in hospital for a cardiology consultation on 7/30/2021, at that time he had been experiencing intermittent chest discomfort, and was found to be in atrial fibrillation with RVR.  TTE demonstrated LVEF 45 to 50%, possible mild basal inferolateral, distal inferior hypokinesis.  Ultimately he underwent CHEYANNE/cardioversion 8/2/2021.  He was started on apixaban.  Clopidogrel was discontinued due to concern for bleeding risk.    He was admitted on 8/23/2021 with back pain, a CT aorta study did not demonstrate any acute aortic pathology.  A nuclear stress test was done on 8/24/2021 that was read as \"probably abnormal\", demonstrating a nontransmural infarction in the apical and inferior segments associated with a mild degree of arnoldo-infarct ischemia.  My colleague Dr. Russell was contacted by the hospitalist service given the patient's recent admission, and had recommended a coronary CTA.      Unfortunately, there was a misunderstanding and patient did not have this scheduled, nor a repeat limited echocardiogram that was also ordered.    I had seen him in follow-up in clinic on 9/17/2021.  At that time, on physical exam his pulse was irregularly irregular, consistent with atrial fibrillation, and so the plan was for 3 weeks of anticoagulation followed by a DCCV and repeat limited TTE.    We reviewed his recent hospitalization, discussed options for further evaluation/management, offered cardiac catheterization/coronary angiography, also offered cardiac MRI stress test.  Discussed benefits/limitations/risks of each approach at length with the patient and his wife.  Patient has been feeling quite well, and so declined further ischemic evaluation at that time, preferring to focus on atrial fibrillation first.    Patient was restarted on clopidogrel 75 mg daily as well, as patient had been on this previously for history " of AAA s/p repair.    Unfortunately, patient reports that over the past several days, he has had intermittent right flank pain, as well as hematuria.    He denies any chest pain, chest pressure, palpitations.  He does note worsening shortness of breath with exertion over the past 1 to 2 weeks.    In the ED, initial blood pressure was 154/92 mmHg, heart rate 110 bpm. Initial labs were notable for potassium 4.2, creatinine 2, troponin normal, , ESR 51, hemoglobin 9.4 (10.9 9/10/2021), (12.5 8/2/2021).    A CT abdomen/pelvis was done which demonstrated right mid to distal ureteral calculus causing at least partial obstruction, as well as possible superimposed pyelonephritis.  He was also found to have a new moderate-sized pericardial effusion, cholelithiasis.  He has a known abdominal aortic aneurysm measuring up to 7.6 cm, postoperative changes status post aortic artery aneurysm repair.  No significant change from prior, though the aneurysm is difficult to evaluate due to streak artifact.  A stat TTE was done earlier this morning that demonstrated moderate pericardial effusion as well as echocardiographic findings suggesting early hemodynamic compromise, LV function was normal with hypokinesis of the mid to distal inferolateral segments.    ECG demonstrates sinus tachycardia at 102 bpm, T wave inversions in leads I, aVL, aVF, V5 to V6.  Q waves inferiorly.  Compared to prior ECG from 8/23/2021, the T wave inversions are new.    Last ischemic evaluation was a nuclear stress test 8/24/2021 that demonstrated nontransmural infarction in the apical and inferior segments associated with a mild degree of arnoldo-infarct ischemia.         Past Medical History:   I have reviewed this patient's past medical history    Past Medical History:   Diagnosis Date     AAA (abdominal aortic aneurysm)      BCC (basal cell carcinoma of skin) - face      CLL (chronic lymphocytic leukemia)      Diaphragmatic hernia      Diverticulitis  of colon      Esophageal reflux      Essential hypertension      Hyperlipidemia      Irritable bowel syndrome      Macular degeneration (senile) of retina      Mumps      Squamous Cell Carcinoma, Back      Type 2 diabetes mellitus              Past Surgical History:   I have reviewed this patient's past surgical history   Past Surgical History:   Procedure Laterality Date     ANESTHESIA CARDIOVERSION N/A 2021    Procedure: ANESTHESIA, FOR CARDIOVERSION;  Surgeon: GENERIC ANESTHESIA PROVIDER;  Location: [facility] OR     APPENDECTOMY OPEN  1966     BONE MARROW BIOPSY, BONE SPECIMEN, NEEDLE/TROCAR N/A 2017    Procedure: BIOPSY BONE MARROW;  BONE MARROW BIOPSY;  Surgeon: Shady Garcia MD;  Location:  GI     COLONOSCOPY       ENDOVASCULAR REPAIR ANEURYSM ABDOMINAL AORTA N/A 2017    Procedure: ENDOVASCULAR REPAIR ANEURYSM ABDOMINAL AORTA;  ENDOVASCULAR REPAIR ANEURYSM ABDOMINAL AORTA;  Surgeon: Milton Cazares MD;  Location:  OR     Fistulotomy with marsupialization for repair of fisture in ano       IR ABDOMINAL AORTOGRAM  3/11/2019     IR VISCERAL EMBOLIZATION  2019     Multiple skin tags - resection  1998     Squamous cell skin cancer resection - back       VASECTOMY               Social History:   I have reviewed this patient's social history  Social History     Tobacco Use     Smoking status: Former Smoker     Packs/day: 0.50     Years: 20.00     Pack years: 10.00     Quit date: 1975     Years since quittin.7     Smokeless tobacco: Former User   Substance Use Topics     Alcohol use: Yes     Alcohol/week: 10.0 - 14.0 standard drinks     Types: 10 - 14 Standard drinks or equivalent per week     Comment: 3 drinks a day/wine             Family History:   I have reviewed this patient's family history  Family History   Problem Relation Age of Onset     Cerebrovascular Disease Father         x 2     Hypertension Mother      C.A.D. Mother      Cancer Mother          skin     Eye Disorder Mother         glaucoma     Prostate Cancer No family hx of      Cancer - colorectal No family hx of      Glaucoma No family hx of      Macular Degeneration No family hx of              Allergies:   I have reviewed this patient's allergy history  Allergies   Allergen Reactions     Ace Inhibitors      cough             Medications reviewed:   Prior to admission medications:  Prior to Admission medications    Medication Sig Start Date End Date Taking? Authorizing Provider   apixaban ANTICOAGULANT (ELIQUIS) 5 MG tablet Take 5 mg twice daily. Please DO NOT take this medication until you are instructed to restart this by your Oncologist. 9/8/21   Vanessa Munson MD   blood glucose monitoring (NO BRAND SPECIFIED) meter device kit Use to test blood sugar 2 times daily or as directed. 8/9/21   Vanessa Munson MD   clopidogrel (PLAVIX) 75 MG tablet Take 1 tablet (75 mg) by mouth daily 9/13/21   Raheel Schaffer MD   CONTOUR NEXT TEST test strip USE TO TEST BLOOD SUGAR 1-2 TIMES DAILY OR AS DIRECTED (ADJUSTING ORAL MEDICATIONS). 11/16/20   Elizabeth Alaniz, APRN CNP   glimepiride (AMARYL) 1 MG tablet Take 1 tablet (1 mg) by mouth every morning (before breakfast) 9/17/21   Oral Fisher PA-C   insulin glargine (LANTUS PEN) 100 UNIT/ML pen Inject 22 Units Subcutaneous daily 9/23/21   Vanessa Munson MD   insulin pen needle (BD SHRUTHI U/F) 32G X 4 MM miscellaneous Use 1 daily as directed. 9/10/21   Vanessa Munson MD   metFORMIN (GLUCOPHAGE-XR) 500 MG 24 hr tablet Take 2 tablets (1,000 mg) by mouth daily 9/23/21   Sandi Eastman MD   Multiple Vitamins-Minerals (CENTRUM SILVER) per tablet Take 1 tablet by mouth daily    Reported, Patient   ondansetron (ZOFRAN-ODT) 4 MG ODT tab Take 1 tablet (4 mg) by mouth every 8 hours as needed for nausea 9/26/21   Audie Jay MD   rosuvastatin (CRESTOR) 20 MG tablet Take 1 tablet (20 mg) by mouth daily 9/8/21 9/3/22  Vanessa Munson  MD KELSEY   tamsulosin (FLOMAX) 0.4 MG capsule Take 1 capsule (0.4 mg) by mouth daily 9/15/21 12/14/21  Wenceslao Teran MD      Current medications:  Current Facility-Administered Medications Ordered in Epic   Medication Dose Route Frequency Last Rate Last Admin     HYDROmorphone (PF) (DILAUDID) injection 0.5 mg  0.5 mg Intravenous Q2H PRN   0.5 mg at 21 0512     ondansetron (ZOFRAN) injection 4 mg  4 mg Intravenous Q6H PRN   4 mg at 21 0512     Current Outpatient Medications Ordered in Epic   Medication     apixaban ANTICOAGULANT (ELIQUIS) 5 MG tablet     blood glucose monitoring (NO BRAND SPECIFIED) meter device kit     clopidogrel (PLAVIX) 75 MG tablet     CONTOUR NEXT TEST test strip     glimepiride (AMARYL) 1 MG tablet     insulin glargine (LANTUS PEN) 100 UNIT/ML pen     insulin pen needle (BD SHRUTHI U/F) 32G X 4 MM miscellaneous     metFORMIN (GLUCOPHAGE-XR) 500 MG 24 hr tablet     Multiple Vitamins-Minerals (CENTRUM SILVER) per tablet     ondansetron (ZOFRAN-ODT) 4 MG ODT tab     rosuvastatin (CRESTOR) 20 MG tablet     tamsulosin (FLOMAX) 0.4 MG capsule             Review of Systems:   A complete review of systems was performed and was negative except as mentioned in the HPI.          Physical Exam:   Vital signs were personally reviewed:  Temperatures:  Current - Temp: 97.9  F (36.6  C); Max - Temp  Av.4  F (36.9  C)  Min: 97.9  F (36.6  C)  Max: 99  F (37.2  C)  Respiration range: Resp  Av.3  Min: 14  Max: 26  Pulse range: Pulse  Av.6  Min: 98  Max: 110  Blood pressure range: Systolic (24hrs), Av , Min:107 , Max:167   ; Diastolic (24hrs), Av, Min:66, Max:103    Pulse oximetry range: SpO2  Av.5 %  Min: 95 %  Max: 100 %  No intake or output data in the 24 hours ending 21 0845    Constitutional: appears stated age, in no apparent distress, appears to be well nourished  Eyes: sclera anicteric, conjunctiva normal, no lesions on eyelids or lashes  ENT:  normocephalic, without obvious abnormality, atraumatic, external ears without lesions,   Pulmonary: clear to auscultation bilaterally  Cardiovascular: JVP elevated above clavicle at 90', mildly distant heart sounds, tachycardic, regular rhythm  Gastrointestinal: abdominal exam benign, non-tender, no rigidity, no guarding  Neurologic: awake, alert, face symmetrical, moves all extremities  Skin: no abnormal rashes or lesions on limited exam, nails normal without discoloration or clubbing, no jaundice  Psychiatric: affect is normal, answers questions appropriately, oriented to self and place         Laboratory tests:   Laboratory tests personally reviewed:   CMP  Recent Labs   Lab 09/27/21  0607 09/26/21  1813 09/26/21  1549   NA  --  134 134   POTASSIUM  --  4.2 4.9   CHLORIDE  --  102 102   CO2  --  27 26   ANIONGAP  --  5 6   * 156* 199*   BUN  --  23 19   CR  --  2.02* 1.90*   GFRESTIMATED  --  30* 33*   MAGDALENO  --  9.0 9.8   PROTTOTAL  --  7.0  --    ALBUMIN  --  3.1*  --    BILITOTAL  --  0.7  --    ALKPHOS  --  110  --    AST  --  45  --    ALT  --  82*  --      CBC  Recent Labs   Lab 09/26/21  1813   WBC 8.5   RBC 3.17*   HGB 9.4*   HCT 30.1*   MCV 95   MCH 29.7   MCHC 31.2*   RDW 14.6   PLT 72*     INRNo lab results found in last 7 days.  Lab Results   Component Value Date    TROPI <0.015 08/23/2020    TROPONIN <0.015 09/26/2021    TROPONIN <0.015 08/24/2021    TROPONIN <0.015 08/23/2021     Recent Labs   Lab Test 04/05/21  0913 01/15/21  1527 11/17/16  0731 10/23/15  0903 08/05/14  0755 08/05/14  0755   CHOL 166 109   < > 109   < > 131   HDL 57 51   < > 43   < > 47   LDL 96 27   < > 54   < > 63   TRIG 64 157*   < > 62   < > 105   CHOLHDLRATIO  --   --   --  2.5  --  2.8    < > = values in this interval not displayed.     Lab Results   Component Value Date    A1C 7.0 07/30/2021    A1C 5.9 04/05/2021    A1C 6.7 01/15/2021    A1C 5.6 08/23/2020    A1C 5.5 07/14/2020    A1C 6.3 12/31/2019     TSH   Date Value  Ref Range Status   2019 3.57 0.40 - 4.00 mU/L Final            Imaging and Additional Data:   Additional data personally reviewed:  Recent Results (from the past 4320 hour(s))   Echo Limited   Result Value    LVEF  55-60%    Narrative    [id number]  [id number]  [id number]  [id number]^NAM^LETICIA^THLEMA     [facility]  Echocardiography Laboratory  [address] [place] [address]     Name: MARK MORA  MRN: [id number]  : 1940  Study Date: 2021 03:00 AM  Age: 81 yrs  Gender: Male  Patient [facility]: [facility]  Reason For Study: Pericardial Effusion  Ordering Physician: LETICIA BROWNING  Performed By: Crissy Leung     BSA: 2.1 m2  Height: 69 in  Weight: 207 lb  HR: 108  BP: 144/66 mmHg  ______________________________________________________________________________  Procedure  Limited Portable Echo Adult. Optison (NDC #1292-4494) given intravenously.  (Emergent exam, abbreviated study performed).  ______________________________________________________________________________  Interpretation Summary     Stat echo in the ED. Limited echo.     There is moderate pericardial effusion. Increased variations in inflow  velovities suggesting early hemodynamic compromise, although no rosalina signs of  tamponade on the current study.  Normal LV function. Hypokinesis of the mid-distal inferolateral segments of  the LV.  The ascending aorta is dilated measuring 4.3 cms at the root and 4.9 cms at  the level of the proximal visualized segment.  Normal RV size and function.  No hemodynamically significant valve disease.  Dilation of the inferior vena cava is present with normal respiratory  variation in diameter.  ______________________________________________________________________________  Left Ventricle  The left ventricle is normal in size. There is normal left ventricular wall  thickness. Left ventricular systolic function is normal. The visual ejection  fraction is 55-60%. There is  mild inferolateral wall hypokinesis. There is no  thrombus seen in the left ventricle.     Right Ventricle  The right ventricle is normal in size and function.     Atria  Normal left atrial size. Right atrial size is normal.     Mitral Valve  There is mild (1+) mitral regurgitation. There is no mitral valve stenosis.     Tricuspid Valve  The tricuspid valve is normal in structure and function. There is trace  tricuspid regurgitation. Right ventricular systolic pressure could not be  approximated due to inadequate tricuspid regurgitation.     Aortic Valve  The aortic valve is trileaflet with aortic valve sclerosis. There is trace  aortic regurgitation. No hemodynamically significant valvular aortic stenosis.     Pulmonic Valve  The pulmonic valve is not well visualized.     Vessels  Mild aortic root dilatation. Measurres 4.3 cms. The ascending aorta is  Moderately dilated. Measures 4.9 cms. Dilation of the inferior vena cava is  present with normal respiratory variation in diameter. IVC diameter and  respiratory changes fall into an intermediate range suggesting an RA pressure  of 8 mmHg.     Pericardium  Moderate pericardial effusion. The echo/Doppler findings are inconclusive for  cardiac tamponade.     ______________________________________________________________________________  MMode/2D Measurements & Calculations  asc Aorta Diam: 4.9 cm     ______________________________________________________________________________  Report approved by: Sharonda Lynch 2021 04:00 AM         Transesophageal Echocardiogram   Result Value    LVEF  40-45%    [facility]    [id number]  [facility]  [id number]  [id number]^MICHAEL^CEDRIC     [facility]  Echocardiography Laboratory  [address] [place] [address]     Name: MARK MORA  MRN: [id number]  : 1940  Study Date: 2021 08:03 AM  Age: 81 yrs  Gender: Male  Patient Location: [facility]  Reason For Study: Afib  Ordering Physician: MICHAEL  CEDRIC  Performed By: Crisys Leung     BSA: 2.1 m2  Height: 69 in  Weight: 208 lb  HR: 153  BP: 105/74 mmHg  ______________________________________________________________________________  Procedure  Complete CHEYANNE Adult. CHEYANNE Probe serial #[id number] (R) was used during the  procedure. The heart rate, respiratory rate and response to care were  monitored throughout the procedure with the assistance of the nurse.  ______________________________________________________________________________  Interpretation Summary     The visual ejection fraction is 40-45%.  The rhythm was rapid atrial fibrillation.  No thrombus is detected in the left atrial appendage. Normal mechanical  function.  ______________________________________________________________________________  CHEYANNE  Versed (2mg) was given intravenously. Fentanyl (50mcg) was given  intravenously. I determined this patient to be an appropriate candidate for  the planned sedation and procedure and have reassessed the patient immediately  prior to sedation and procedure. Total sedation time: 8 minutes of continuous  bedside 1:1 monitoring. There were no complications associated with this  procedure. The transesophageal probe was passed without difficulty.     Left Ventricle  The left ventricle is normal in size. There is normal left ventricular wall  thickness. The visual ejection fraction is 40-45%. There is mild global  hypokinesia of the left ventricle.     Right Ventricle  The right ventricle is normal size. Right ventricular function cannot be  assessed due to poor image quality.     Atria  A contrast injection (Bubble Study) was performed that was negative for flow  across the interatrial septum. No thrombus is detected in the left atrial  appendage.     Mitral Valve  The mitral valve leaflets appear normal. There is no evidence of stenosis,  fluttering, or prolapse. There is mild (1+) mitral regurgitation.     Tricuspid Valve  Normal tricuspid valve.     Aortic  Valve  There is trivial trileaflet aortic sclerosis. There is trace to mild aortic  regurgitation.     Pulmonic Valve  The pulmonic valve is not well seen, but is grossly normal.     Vessels  The aortic root is normal size. Mild atherosclerotic plaque(s) in the aortic  arch.     Pericardial/Pleural  There is no pericardial effusion.     Rhythm  The rhythm was rapid atrial fibrillation.  ______________________________________________________________________________  Report approved by: Sharonda Hurd 2021 11:18 AM     ______________________________________________________________________________      Echocardiogram Complete   Result Value    LVEF  45-50%    Narrative    [id number]  [id number]  [id number]  [id number]^PATRICIA^SURAJ     [facility]  Echocardiography Laboratory  [address] [place] [address]     Name: MARK MORA  MRN: [id number]  : 1940  Study Date: 2021 08:53 AM  Age: 81 yrs  Gender: Male  Patient [facility]  Reason For Study: Atrial Fibrillation  Ordering Physician: KULWANT GOMEZ  Referring Physician: Vanessa Munson  Performed By: Radha Spain     BSA: 2.1 m2  Height: 69 in  Weight: 212 lb  HR: 109  BP: 151/91 mmHg  ______________________________________________________________________________  Procedure  Complete Portable Echo Adult.  ______________________________________________________________________________  Interpretation Summary     The visual ejection fraction is 45-50%.  Left ventricular systolic function is mildly reduced.  With rapid atrial fibrillation, the visual approximation of left ventricular  systolic function may be falsely decreased.  There are regional wall motion abnormalities as specified.  Contrast would increase the accuracy of regional wall motion analysis.  Mild aortic root dilatation.  The ascending aorta is Mildly dilated.  The aortic arch is Mildly dilated.  The right ventricle was not well seen in the apical  acoustic window. In the  subcostal views, the right ventricular systolic function appears to be mildly  reduced.  There is trace aortic regurgitation.  The rhythm was rapid atrial fibrillation.  The study was technically difficult.  ______________________________________________________________________________  Left Ventricle  The left ventricle is normal in size. There is mild concentric left  ventricular hypertrophy. A sigmoid septum is present. Diastolic function not  assessed due to atrial fibrillation. With rapid atrial fibrillation, the  visual approximation of left ventricular systolic function may be falsely  decreased. The visual ejection fraction is 45-50%. Left ventricular systolic  function is mildly reduced. In some views the mid to basal inferolateral wall  and the distal inferior wall appear mildly to moderately hypokinetic. There  are regional wall motion abnormalities as specified.     Right Ventricle  The right ventricle is normal size. Right ventricular function cannot be  assessed due to poor image quality. The right ventricle was not well seen in  the apical acoustic window. In the subcostal views, the right ventricular  systolic function appears to be mildly reduced.     Atria  Normal left atrial size. Right atrial size is normal. There is no color  Doppler evidence of an atrial shunt.     Mitral Valve  There is mild mitral annular calcification. There is trace mitral  regurgitation.     Tricuspid Valve  There is trace tricuspid regurgitation. Right ventricular systolic pressure  could not be approximated due to inadequate tricuspid regurgitation.     Aortic Valve  The aortic valve is trileaflet. There is moderate trileaflet aortic sclerosis.  There is trace aortic regurgitation. No hemodynamically significant valvular  aortic stenosis.     Pulmonic Valve  There is no pulmonic valvular regurgitation. Normal pulmonic valve velocity.     Vessels  Mild aortic root dilatation. The ascending aorta  is Mildly dilated. The aortic  arch is Mildly dilated. IVC diameter and respiratory changes fall into an  intermediate range suggesting an RA pressure of 8 mmHg.     Pericardium  There is no pericardial effusion.     Rhythm  The rhythm was rapid atrial fibrillation.  ______________________________________________________________________________  MMode/2D Measurements & Calculations  IVSd: 1.8 cm     LVIDd: 3.7 cm  LVIDs: 3.1 cm  LVPWd: 1.2 cm  FS: 15.3 %  LV mass(C)d: 213.0 grams  LV mass(C)dI: 100.6 grams/m2  Ao root diam: 4.1 cm  LA dimension: 3.6 cm  asc Aorta Diam: 4.3 cm  Ao Arch Diam (Proximal trans.): 3.8 cm  LA/Ao: 0.87  LVOT diam: 2.3 cm  LVOT area: 4.3 cm2  LA Volume (BP): 54.4 ml  LA Volume Index (BP): 25.7 ml/m2  RWT: 0.66     Doppler Measurements & Calculations  LV V1 max PG: 3.1 mmHg  LV V1 max: 86.8 cm/sec  LV V1 VTI: 12.2 cm  SV(LVOT): 52.6 ml  SI(LVOT): 24.8 ml/m2  PA acc time: 0.10 sec     ______________________________________________________________________________  Report approved by: Sharonda Humphreys 07/30/2021 09:40 AM

## 2021-09-27 NOTE — PHARMACY-ADMISSION MEDICATION HISTORY
Admission medication history interview status for this patient is complete. See [facility] admission navigator for allergy information, prior to admission medications and immunization status.     Medication history interview done, indicate source(s): Patient  Medication history resources (including written lists, pill bottles, clinic record):[facility]  Pharmacy[facility] [place]    Changes made to [facility] medication list:  Added: None  Changed: None  Reported as Not Taking: None  Removed: ondansetron    Actions taken by pharmacist (provider contacted, etc):None     Additional medication history information:None    Medication reconciliation/reorder completed by provider prior to medication history?  N   (Y/N)     For patients on insulin therapy:   Do you use sliding scale insulin based on blood sugars? No  Do you typically eat three meals a day? Yes  How many times do you check your blood glucose per day? 1-2  How many episodes of hypoglycemia do you typically have per month? None  Do you have a Continuous Glucose Monitor (CGM)? No    Prior to Admission medications    Medication Sig Last Dose Taking? Auth Provider   apixaban ANTICOAGULANT (ELIQUIS) 5 MG tablet Take 5 mg twice daily. Please DO NOT take this medication until you are instructed to restart this by your Oncologist. 9/26/2021 at Unknown time Yes Vanessa Munson MD   clopidogrel (PLAVIX) 75 MG tablet Take 1 tablet (75 mg) by mouth daily 9/26/2021 at Unknown time Yes Raheel Schaffer MD   glimepiride (AMARYL) 1 MG tablet Take 1 tablet (1 mg) by mouth every morning (before breakfast) 9/26/2021 at Unknown time Yes Oral Fisher PA-C   insulin glargine (LANTUS PEN) 100 UNIT/ML pen Inject 22 Units Subcutaneous daily 9/26/2021 at Unknown time Yes Vanessa Munson MD   metFORMIN (GLUCOPHAGE-XR) 500 MG 24 hr tablet Take 2 tablets (1,000 mg) by mouth daily 9/26/2021 at Unknown time Yes Sandi Eastman MD   Multiple Vitamins-Minerals (CENTRUM SILVER) per tablet Take 1  tablet by mouth daily 9/26/2021 at Unknown time Yes Reported, Patient   rosuvastatin (CRESTOR) 20 MG tablet Take 1 tablet (20 mg) by mouth daily 9/26/2021 at Unknown time Yes Vanessa Munson MD   tamsulosin (FLOMAX) 0.4 MG capsule Take 1 capsule (0.4 mg) by mouth daily Past Week at Unknown time Yes Wenceslao Teran MD   blood glucose monitoring (NO BRAND SPECIFIED) meter device kit Use to test blood sugar 2 times daily or as directed.   Vanessa Munson MD   CONTOUR NEXT TEST test strip USE TO TEST BLOOD SUGAR 1-2 TIMES DAILY OR AS DIRECTED (ADJUSTING ORAL MEDICATIONS).   Elizabeth Alaniz, APRN CNP   insulin pen needle (BD SHRUTHI U/F) 32G X 4 MM miscellaneous Use 1 daily as directed.   Vanessa Munson MD

## 2021-09-27 NOTE — ED PROVIDER NOTES
0630: Patient was signed out to me by Dr. Ramirez.  Plan at time of signout was evaluation by cardiology this morning to determine disposition.  Patient is resting in no distress at this time and aware of the plan.  In brief, patient presented for flank pain found to have a small renal calculi without complication.  His flank pain is completely resolved since treatment has had no further pain.  There is no emergent indication for removal of the stone or admission requirement due to the kidney stone.  He was found to have a pericardial effusion on CT imaging.  An echocardiogram is notable for moderate pericardial effusion concerning for potential early hemodynamic compromise but no rosalina signs of tamponade.  Has been mildly tachycardic and has been dyspneic with exertion for the last 1 month but has no acute shortness of breath or chest pain today.    0840: Spoke with Dr. Schaffer, cardiology who evaluate the patient at the bedside.  He recommends observation overnight with repeat echocardiogram in the morning and recommends transfer to a facility capable of performing a pericardiocentesis which we are not capable of here.  He does not have chest pain or EKG findings consistent with pericarditis, however, he is on anticoagulation and is having a hemoglobin drop over the last month or so which is concerning for potential blood as a source of pericardial effusion.  We discussed the plan with the patient and he is in agreement.    Throughout the course of the day, we attempted placement at multiple facilities and there has been no bed availability.  We have been contacting the [facility] line as well as [facility] patient placement without success.  I have ordered the patient's morning Metformin and glimepiride and Lantus.  Cardiology recommends holding off on Plavix and Eliquis at this time.    3:02 PM we continue to be in search of an inpatient bed.  I evaluated patient at bedside and he is resting comfortably in a recliner and has  no complaints and is in agreement with the continued plan to search for an available bed.  He is signed over to Dr. Pillai, who is familiar with the patient, at the routine end of my shift.       Chao Ni MD  09/27/21 1500       Chao Ni MD  09/27/21 [id number]

## 2021-09-28 ENCOUNTER — ANESTHESIA EVENT (OUTPATIENT)
Dept: SURGERY | Facility: CLINIC | Age: 81
End: 2021-09-28

## 2021-09-28 ENCOUNTER — ANESTHESIA (OUTPATIENT)
Dept: SURGERY | Facility: CLINIC | Age: 81
End: 2021-09-28

## 2021-10-01 LAB
CREATININE (EXTERNAL): 1.9 MG/DL (ref 0.74–1.35)
GFR ESTIMATED (EXTERNAL): 33 ML/MIN/BSA
GFR ESTIMATED (IF AFRICAN AMERICAN) (EXTERNAL): 38 ML/MIN/BSA
POTASSIUM (EXTERNAL): 5 MMOL/L (ref 3.6–5.2)

## 2021-10-02 ENCOUNTER — TRANSFERRED RECORDS (OUTPATIENT)
Dept: HEALTH INFORMATION MANAGEMENT | Facility: CLINIC | Age: 81
End: 2021-10-02

## 2021-10-04 ENCOUNTER — MYC MEDICAL ADVICE (OUTPATIENT)
Dept: PEDIATRICS | Facility: CLINIC | Age: 81
End: 2021-10-04

## 2021-10-04 NOTE — TELEPHONE ENCOUNTER
Dr. Munson-  Patient dc'd from [facility] 10/1. When would you like to see him? Nicole Lion RN on 10/4/2021 at 12:44 PM      RECOMMENDATIONS FOR FOLLOW-UP APPOINTMENTS  -- follow-up with PCP with CBC and BMP in 7-10 days  -- continue colchicine for 3 months and prednisone for 6 weeks  -- Bactrim for PCP prophylaxis for duration of prednisone course  -- urology follow-up as scheduled  -- follow-up in pericardial Clinic in December, in the interim follow-up with Dr. Schaffer with local cardiology  -- repeat TTE in 4-6 weeks to evaluate resolution of pericardial effusion

## 2021-10-05 DIAGNOSIS — I48.91 ATRIAL FIBRILLATION, UNSPECIFIED TYPE (H): ICD-10-CM

## 2021-10-06 ENCOUNTER — OFFICE VISIT (OUTPATIENT)
Dept: PEDIATRICS | Facility: CLINIC | Age: 81
End: 2021-10-06
Payer: COMMERCIAL

## 2021-10-06 VITALS
TEMPERATURE: 97.2 F | HEART RATE: 93 BPM | WEIGHT: 205 LBS | DIASTOLIC BLOOD PRESSURE: 80 MMHG | SYSTOLIC BLOOD PRESSURE: 150 MMHG | BODY MASS INDEX: 30.27 KG/M2

## 2021-10-06 DIAGNOSIS — Z79.4 TYPE 2 DIABETES MELLITUS WITH OTHER SPECIFIED COMPLICATION, WITH LONG-TERM CURRENT USE OF INSULIN (H): ICD-10-CM

## 2021-10-06 DIAGNOSIS — D62 ANEMIA DUE TO BLOOD LOSS, ACUTE: ICD-10-CM

## 2021-10-06 DIAGNOSIS — Z09 HOSPITAL DISCHARGE FOLLOW-UP: Primary | ICD-10-CM

## 2021-10-06 DIAGNOSIS — N17.9 ACUTE RENAL FAILURE, UNSPECIFIED ACUTE RENAL FAILURE TYPE (H): ICD-10-CM

## 2021-10-06 DIAGNOSIS — I30.9 ACUTE PERICARDIAL EFFUSION: ICD-10-CM

## 2021-10-06 DIAGNOSIS — E11.69 TYPE 2 DIABETES MELLITUS WITH OTHER SPECIFIED COMPLICATION, WITH LONG-TERM CURRENT USE OF INSULIN (H): ICD-10-CM

## 2021-10-06 DIAGNOSIS — I48.0 PAROXYSMAL ATRIAL FIBRILLATION (H): ICD-10-CM

## 2021-10-06 PROBLEM — N20.1 URETEROLITHIASIS: Status: ACTIVE | Noted: 2021-09-27

## 2021-10-06 PROBLEM — H90.3 SENSORINEURAL HEARING LOSS, BILATERAL: Status: ACTIVE | Noted: 2021-10-06

## 2021-10-06 PROBLEM — I71.40 ABDOMINAL AORTIC ANEURYSM (AAA) (H): Status: ACTIVE | Noted: 2017-11-29

## 2021-10-06 PROBLEM — C91.10 CHRONIC LYMPHOCYTIC LEUKEMIA (CLL), B-CELL (H): Status: ACTIVE | Noted: 2017-11-17

## 2021-10-06 LAB
ERYTHROCYTE [DISTWIDTH] IN BLOOD BY AUTOMATED COUNT: 14.8 % (ref 10–15)
HCT VFR BLD AUTO: 32.7 % (ref 40–53)
HGB BLD-MCNC: 10.1 G/DL (ref 13.3–17.7)
MCH RBC QN AUTO: 29.1 PG (ref 26.5–33)
MCHC RBC AUTO-ENTMCNC: 30.9 G/DL (ref 31.5–36.5)
MCV RBC AUTO: 94 FL (ref 78–100)
PLATELET # BLD AUTO: 137 10E3/UL (ref 150–450)
RBC # BLD AUTO: 3.47 10E6/UL (ref 4.4–5.9)
WBC # BLD AUTO: 6.1 10E3/UL (ref 4–11)

## 2021-10-06 PROCEDURE — 36415 COLL VENOUS BLD VENIPUNCTURE: CPT | Performed by: STUDENT IN AN ORGANIZED HEALTH CARE EDUCATION/TRAINING PROGRAM

## 2021-10-06 PROCEDURE — 85027 COMPLETE CBC AUTOMATED: CPT | Performed by: STUDENT IN AN ORGANIZED HEALTH CARE EDUCATION/TRAINING PROGRAM

## 2021-10-06 PROCEDURE — 99214 OFFICE O/P EST MOD 30 MIN: CPT | Mod: GC | Performed by: STUDENT IN AN ORGANIZED HEALTH CARE EDUCATION/TRAINING PROGRAM

## 2021-10-06 PROCEDURE — 80048 BASIC METABOLIC PNL TOTAL CA: CPT | Performed by: STUDENT IN AN ORGANIZED HEALTH CARE EDUCATION/TRAINING PROGRAM

## 2021-10-06 RX ORDER — PREDNISONE 20 MG/1
40 TABLET ORAL
COMMUNITY
Start: 2021-10-01 | End: 2021-10-13

## 2021-10-06 RX ORDER — PREDNISONE 10 MG/1
30 TABLET ORAL DAILY
COMMUNITY
Start: 2021-10-13 | End: 2021-10-19

## 2021-10-06 RX ORDER — METOPROLOL SUCCINATE 50 MG/1
50 TABLET, EXTENDED RELEASE ORAL DAILY
COMMUNITY
Start: 2021-10-02 | End: 2022-02-15

## 2021-10-06 RX ORDER — GINSENG 100 MG
1 CAPSULE ORAL PRN
COMMUNITY
Start: 2021-10-01 | End: 2022-08-02

## 2021-10-06 RX ORDER — INSULIN LISPRO 100 [IU]/ML
10 INJECTION, SOLUTION INTRAVENOUS; SUBCUTANEOUS
COMMUNITY
Start: 2021-10-01 | End: 2021-10-15

## 2021-10-06 RX ORDER — PREDNISONE 20 MG/1
20 TABLET ORAL DAILY
COMMUNITY
Start: 2021-10-19 | End: 2021-10-18

## 2021-10-06 RX ORDER — SULFAMETHOXAZOLE AND TRIMETHOPRIM 400; 80 MG/1; MG/1
1 TABLET ORAL DAILY
COMMUNITY
Start: 2021-10-01 | End: 2021-11-10

## 2021-10-06 RX ORDER — DUTASTERIDE 0.5 MG/1
0.5 CAPSULE, LIQUID FILLED ORAL
COMMUNITY
Start: 2021-10-02 | End: 2022-04-04

## 2021-10-06 RX ORDER — PREDNISONE 10 MG/1
10 TABLET ORAL DAILY
COMMUNITY
Start: 2021-10-26 | End: 2021-10-13

## 2021-10-06 RX ORDER — PREDNISONE 5 MG/1
5 TABLET ORAL DAILY
COMMUNITY
Start: 2021-11-02 | End: 2021-10-18

## 2021-10-06 RX ORDER — BACILLUS COAGULANS 1B CELL
65 CAPSULE ORAL
COMMUNITY
Start: 2021-10-01 | End: 2022-03-10

## 2021-10-06 RX ORDER — COLCHICINE 0.6 MG/1
0.3 TABLET ORAL
COMMUNITY
Start: 2021-10-01 | End: 2021-10-20

## 2021-10-06 NOTE — PROGRESS NOTES
Assessment & Plan   Acute pericardial effusion  (primary encounter diagnosis)  Found to have a moderate pericardial effusion with elevated inflammatory markers concerning for pericarditis. Initiated on colchicine and a prednisone taper with improvement. Will follow up with repeat TTE and in [facility] cardiology clinic. Interim appointments with his primary cardiologist will be kept as well. He appears to be stable at this time and has had no orthopnea, chest pain, or dyspnea.   Plan: Follow up with Dr. Schaffer and with the [facility] cardiologist as sceduled. Continue prednisone taper and colchicine.     Anemia due to blood loss, acute  Hematuria   Has had no further hematuria and feels that his fatigue has much improved from prior. Will recheck hemoglobin and he will keep his follow up appointment with Urology as scheduled. Continue holding Apixaban until cleared by Urology. Hgb was 8.3 at the time of discharge.   Plan: CBC with platelets, follow up with Urology as scheduled     Acute renal failure, unspecified acute renal failure type (H)  Patient with baseline creatinine of 1.4-1.6 had an NINA with peak creatinine of 2.3 with improvement to 1.9 at the time of discharge. Will recheck.   Plan: BMP    Paroxysmal atrial fibrillation (H)  Patient with CHADS-VASC of 5 holding on AC in the setting of recent hematuria. Will restart pending Urology evaluation. In NSR at the time of this visit.     Type 2 diabetes mellitus   Patient taking glargine and two oral agents prior to his admission to the hospital - metformin and glimepiride were held due to his NINA and prandial insulin was added. Will continue with the short acting insulin in addition to his glargine while on steroids. With improving renal function and stable blood sugars, hope to resume prior regimen as able.   Plan: BMP, pharmacy visit next week         Elizabeth Hendricks MD  [facility] [place] Leblanc is a 81 year old who presents for the  following health issues:    JOSE G Leblanc presented to the hospital with hematuria and was found to have a pericardial effusion. Urology consulted and did not find a stone, now s/p CBI and had no hematuria. Has not had hematuria or difficultly with voiding since discharge. The effusion was found per bedside echocardiogram which was performed due to shortness of breath. Denies chest pain. Presumed pericarditis and treated with prednisone and colchicine.     Since being home he has had no dysuria or hematuria. No chest pain or palpitations. He also feels that he hasn't had any shortness of breath that he has noticed. No lightheadedness. No orthopnea, though appears to have had a history of this within the last few weeks.      Blood sugars in the AM have been in the 130's. Was 150 this morning. Has been fairly well controlled with prandial insulin.     ED/UC Followup:    Facility:  [facility]   Date of visit: 9/26  Reason for visit: 9/27  Current Status: feeling better,  Pt stated     [facility]      For 1 week      ureter bleed treated for inflammation  Normal void no pain       Constitutional, HEENT, cardiovascular, pulmonary, gi and gu systems are negative, except as otherwise noted.      Objective    BP (!) 150/80   Pulse 93   Temp 97.2  F (36.2  C) (Oral)   Wt 93 kg (205 lb)   BMI 30.27 kg/m    Body mass index is 30.27 kg/m .  Physical Exam   GENERAL: healthy, alert and no distress  RESP: lungs clear to auscultation - no rales, rhonchi or wheezes  CV: RRR, no murmur appreciated, well perfused, trace peripheral edema   ABDOMEN: soft, nontender, no HSM, non-distended   MS: no gross musculoskeletal defects noted, no edema  SKIN: no suspicious lesions or rashes  NEURO: Normal strength and tone, mentation intact and speech normal  PSYCH: mentation appears normal, affect normal/bright    ----- Service Performed and Documented by Resident or Fellow ------    Physician Attestation   I, Vanessa Munson MD, saw this  patient and agree with the findings and plan of care as documented in the note.      Items personally reviewed/procedural attestation: vitals, labs and agree with the interpretation documented in the note.    Vanessa Munson MD   Internal Medicine - Pediatrics

## 2021-10-08 LAB
ANION GAP SERPL CALCULATED.3IONS-SCNC: 10 MMOL/L (ref 3–14)
BUN SERPL-MCNC: 28 MG/DL (ref 7–30)
CALCIUM SERPL-MCNC: 8.4 MG/DL (ref 8.5–10.1)
CHLORIDE BLD-SCNC: 102 MMOL/L (ref 94–109)
CO2 SERPL-SCNC: 22 MMOL/L (ref 20–32)
CREAT SERPL-MCNC: 1.39 MG/DL (ref 0.66–1.25)
GFR SERPL CREATININE-BSD FRML MDRD: 48 ML/MIN/1.73M2
GLUCOSE BLD-MCNC: 272 MG/DL (ref 70–99)
POTASSIUM BLD-SCNC: 5 MMOL/L (ref 3.4–5.3)
SODIUM SERPL-SCNC: 134 MMOL/L (ref 133–144)

## 2021-10-09 DIAGNOSIS — E11.649 TYPE 2 DIABETES MELLITUS WITH HYPOGLYCEMIA WITHOUT COMA, WITHOUT LONG-TERM CURRENT USE OF INSULIN (H): ICD-10-CM

## 2021-10-12 RX ORDER — GLIMEPIRIDE 1 MG/1
1 TABLET ORAL
Qty: 30 TABLET | Refills: 0 | Status: SHIPPED | OUTPATIENT
Start: 2021-10-12 | End: 2021-10-13

## 2021-10-12 NOTE — TELEPHONE ENCOUNTER
Routing refill request to provider for review/approval because:  See 10/6/21 appointment note, confirm glimepiride refill, pt has MTM appointment tomorrow  Alda Esquivel RN, BSN  Message handled by CLINIC NURSE.

## 2021-10-13 ENCOUNTER — VIRTUAL VISIT (OUTPATIENT)
Dept: PHARMACY | Facility: CLINIC | Age: 81
End: 2021-10-13
Payer: COMMERCIAL

## 2021-10-13 ENCOUNTER — THERAPY VISIT (OUTPATIENT)
Dept: PHYSICAL THERAPY | Facility: CLINIC | Age: 81
End: 2021-10-13
Payer: COMMERCIAL

## 2021-10-13 DIAGNOSIS — M25.50 ARTHRALGIA, UNSPECIFIED JOINT: Primary | ICD-10-CM

## 2021-10-13 DIAGNOSIS — G89.29 CHRONIC PAIN OF BOTH KNEES: ICD-10-CM

## 2021-10-13 DIAGNOSIS — I10 HYPERTENSION GOAL BP (BLOOD PRESSURE) < 140/90: ICD-10-CM

## 2021-10-13 DIAGNOSIS — M25.561 BILATERAL KNEE PAIN: ICD-10-CM

## 2021-10-13 DIAGNOSIS — M25.552 HIP PAIN, BILATERAL: Primary | ICD-10-CM

## 2021-10-13 DIAGNOSIS — M54.2 CERVICAL PAIN: ICD-10-CM

## 2021-10-13 DIAGNOSIS — R97.20 ELEVATED PROSTATE SPECIFIC ANTIGEN (PSA): ICD-10-CM

## 2021-10-13 DIAGNOSIS — M25.562 BILATERAL KNEE PAIN: ICD-10-CM

## 2021-10-13 DIAGNOSIS — M25.561 CHRONIC PAIN OF BOTH KNEES: ICD-10-CM

## 2021-10-13 DIAGNOSIS — M25.551 BILATERAL HIP PAIN: Primary | ICD-10-CM

## 2021-10-13 DIAGNOSIS — I48.0 PAROXYSMAL ATRIAL FIBRILLATION (H): ICD-10-CM

## 2021-10-13 DIAGNOSIS — M25.551 HIP PAIN, BILATERAL: Primary | ICD-10-CM

## 2021-10-13 DIAGNOSIS — M25.562 CHRONIC PAIN OF BOTH KNEES: ICD-10-CM

## 2021-10-13 DIAGNOSIS — I71.40 ABDOMINAL AORTIC ANEURYSM (AAA) WITHOUT RUPTURE (H): ICD-10-CM

## 2021-10-13 DIAGNOSIS — E11.9 TYPE 2 DIABETES MELLITUS WITHOUT COMPLICATION, WITHOUT LONG-TERM CURRENT USE OF INSULIN (H): ICD-10-CM

## 2021-10-13 DIAGNOSIS — M54.6 ACUTE MIDLINE THORACIC BACK PAIN: ICD-10-CM

## 2021-10-13 DIAGNOSIS — M25.552 BILATERAL HIP PAIN: Primary | ICD-10-CM

## 2021-10-13 DIAGNOSIS — I30.9 ACUTE PERICARDIAL EFFUSION: ICD-10-CM

## 2021-10-13 PROCEDURE — 97161 PT EVAL LOW COMPLEX 20 MIN: CPT | Mod: GP | Performed by: PHYSICAL THERAPIST

## 2021-10-13 PROCEDURE — 99607 MTMS BY PHARM ADDL 15 MIN: CPT | Performed by: PHARMACIST

## 2021-10-13 PROCEDURE — 97110 THERAPEUTIC EXERCISES: CPT | Mod: GP | Performed by: PHYSICAL THERAPIST

## 2021-10-13 PROCEDURE — 99606 MTMS BY PHARM EST 15 MIN: CPT | Performed by: PHARMACIST

## 2021-10-13 NOTE — PATIENT INSTRUCTIONS
Recommendations from today's MTM visit:                                                      1. We can work on taking care of the appts with the cardiology team here. Continue to following with [facility] team.    2. Increase Lantus to 28 units daily.    3. Send me an update in a few weeks on your blood sugar readings.     4. Take colchicine 0.3 mg twice daily only.    5. Please bring up your hip pain at PHYSICAL THERAPY      Follow-up: weekly via [facility]    It was great to speak with you today.  I value your experience and would be very thankful for your time with providing feedback on our clinic survey. You may receive a survey via email or text message in the next few days.     To schedule another MTM appointment, please call the clinic directly or you may call the MTM scheduling line at [phone] or toll-free at [phone].     My Clinical Pharmacist's contact information:                                                      Please feel free to contact me with any questions or concerns you have.      Klaudia Santa, PharmD  Medication Therapy Management Pharmacist  Pager#: [phone]

## 2021-10-13 NOTE — Clinical Note
Hi there! Patient is following [facility] and wanted to cancel the future cardiology visits with the [facility]. Can someone assist with that or does patient just need to call and cancel? I think he needs more directions on this. Also would you be ok if I send a 14 days rx for colchicine 0.3 mg twice daily. He was accidentally taking 0.6 mg twice daily until we talked and will run out. Thank you!

## 2021-10-13 NOTE — PROGRESS NOTES
Medication Therapy Management (MTM) Encounter    ASSESSMENT:                            Medication Adherence/Access: No issues identified    Pain: recommend he follow-up with PHYSICAL THERAPY.    Acute pericarditis with acute pericardial effusion: he is taking higher then recommended colchicine, concern for drug interaction with statin.    Type 2 Diabetes:  Needs improvement, suggest he increase his basal insulin at this time. As we taper prednisone may reduce but for now given blood sugar in the 200's would adjust basal over his mealtime insulin.    AAA (repair in 2017)/Afib/HTN: continue follow-up with [facility] cardiology team.    Hyperlipidemia: d-dx interaction with colchicine, patient to reduce colchicine dose as directed. His new pains are primarily at the join seems to be unlikely to be statin related.    BPH: too soon to identify benefit from dutasteride. Continue current therapy.    PLAN:                            1. Increase Lantus to 28 units daily     2. Check-in in a few days if blood sugars not improvement then likely need adjustment to Humalog -->  See [facility] for details but recommended starting Humalog adjustment scale for prior to meals 8 units + 2:50 when blood sugar over 150 mg/dL max 18 units per dose.     3. Patient to decrease the colcicine to 0.3mg twice daily as prescribed. He will run out therefore will route PCP a request for Rx Colchicine 14 days to pharmacy.     Follow-up: Return in about 1 week (around 10/20/2021) for [facility].      SUBJECTIVE/OBJECTIVE:                          Austin Garza is a 81 year old male called for a follow-up visit. He was referred to me from Dr. Munson.  Today's visit is a follow-up MTM visit from 9/23/21. Patient was also seen at [facility] on 9/27/21 and discharged on 10/1 per patient. Discharged from ED on 9/26/21 from [facility] for ureteral stone and pericardidits     Reason for visit: medication review.    Allergies/ADRs: Reviewed in chart  Past  Medical History: Reviewed in chart - hearing loss, AAA, artery disection, basal cell carcinoma - face, anemia, CLL, elevated PSA.  Tobacco: He reports that he quit smoking about 46 years ago. He has a 10.00 pack-year smoking history. He has quit using smokeless tobacco.  Alcohol: not currently using    Medication Adherence/Access: he tells me that he looked at the last AVS provided to him and closely reviewed the doses as the medications keep changing.   Patient uses pill box(es), sets up himself.  Patient takes medications 2 time(s) per day.   The patient fills medications at [facility]: NO, fills medications at [facility].    Pain: hip joints notice the most, some in the knees. He noticed the pain at the end of the day to be achy. Denies any stiffness in his joints really just by the end of the day. He is wondering if this is from a medication.    Acute pericarditis with acute pericardial effusion: Taking prednisone 30mg daily (from 40mg decreased yesterday) currently and he was taking colchicine 0.6 mg twice daily on accident, Bactrim 1 tab daily. Patient is prescribed prednisone taper of 40mg 11 days, 30 mg7 days, 20 mg 7 days,10mg 7 days, 5 mg 7 days and Colchicine was suppose to be 0.5 tab/0.3 mg twice daily.   He reports mild discomfort continues.    Type 2 Diabetes:  Currently taking Lantus 24 units daily in the morning, Humalog 10 units before before meals but only takes 8 in breakfast, 10 noon and 12 dinner. He was prescribed Humalog 10 units 3 times daily. Stopped glimpeiride and metformin.  Patient is not experiencing side effects. He admits ideally long-term wants to get off insulin but for now has appreciated the improvement in sugars.   Blood sugar monitoring: from his glucometer:  Date  Lunch/2hours post Dinner /2hours post    10/7 211 76     10/8       10/9       10/10 210 289 159    10/11 234  218    10/12  162 258    10/13 232      Eye exam: up to date  Foot exam: up to date  Aspirin: Taking 325mg daily  and has the following issues: bruising, stomach upset and small black dots on skin note by heme/onc prior too.  Statin: No, see below.  ACEi/ARB: Yes: losartan-hydrochlorothiazide .   Urine Albumin:   Lab Results   Component Value Date    UMALCR 31.92 (H) 12/17/2019     Lab Results   Component Value Date    A1C 7.0 07/30/2021    A1C 5.9 04/05/2021    A1C 6.7 01/15/2021    A1C 5.6 08/23/2020    A1C 5.5 07/14/2020    A1C 6.3 12/31/2019     AAA (repair in 2017)/Afib/HTN: He is following management with [place] team.  Medications:  - Eliquis 5mg twice daily  - metoprolol XR 50mg once daily  - rosuvastatin 20mg daily   - clopidogrel 75 mg daily   He has bruising but no active bleeding concerns.   Specialty:  Has a visit with cardiologty on 9/13 with Dr. Schaffer.      Hyperlipidemia: Current therapy includes rosuvastatin 20 mg daily.  Patient reports new pains mentioned above.  Recent Labs   Lab Test 04/05/21  0913 01/15/21  1527 11/17/16  0731 10/23/15  0903 08/05/14  0755 08/05/14  0755   CHOL 166 109   < > 109   < > 131   HDL 57 51   < > 43   < > 47   LDL 96 27   < > 54   < > 63   TRIG 64 157*   < > 62   < > 105   CHOLHDLRATIO  --   --   --  2.5  --  2.8    < > = values in this interval not displayed.       BPH: take dutasteride 0.5 tablet daily started by [place] team, tamsulosin 0.5mg daily. Denies any side effects. No notable changes at this time.  PSA   Date Value Ref Range Status   12/31/2019 5.49 (H) 0 - 4 ug/L Final     Comment:     Assay Method:  Chemiluminescence using Siemens Vista analyzer         Today's Vitals: There were no vitals taken for this visit.  ----------------  Post Discharge Medication Reconciliation Status: discharge medications reconciled and changed, per note/orders.    I spent 40 minutes with this patient today. All changes were made via collaborative practice agreement with Vanessa Munson MD. A copy of the visit note was provided to the patient's primary care provider.    The patient was sent via  [facility] a summary of these recommendations.     Klaudia Santa, PharmD  Medication Therapy Management Pharmacist  Pager#: [phone]    Telemedicine Visit Details  Type of service:  Telephone visit  Start Time: 9:00 AM  End Time: 9:40 am  Originating Location (patient location): Home  Distant Location (provider location):  [facility]     Medication Therapy Recommendations  Acute pericardial effusion    Current Medication: colchicine (COLCYRS) 0.6 MG tablet   Rationale: Does not understand instructions - Adherence - Adherence   Recommendation: Provide Adherence Intervention - reduce dose.   Status: Patient Agreed - Adherence/Education         Diabetes mellitus, type 2 (H)    Current Medication: insulin glargine (LANTUS PEN) 100 UNIT/ML pen   Rationale: Dose too low - Dosage too low - Effectiveness   Recommendation: Increase Dose   Status: Accepted per CPA

## 2021-10-13 NOTE — PROGRESS NOTES
Physical Therapy Initial Evaluation  Subjective:  The history is provided by the patient. No  was used.   Patient Health History  Autsin Garza being seen for B hip and B knee pain.     Problem began: 10/2/2021.   Problem occurred: Pt is doing really well with his back and neck, feels that his pain is mostly better, however he recently had a trip to the ER for other health issues and he ended up sleeping at the hospital and an emergency ambulance trip to the [facility], pt was prescribed prednisone,    Pain is reported as 2/10 on pain scale.  General health as reported by patient is good.  Pertinent medical history includes: diabetes (recently had blood in urine).   Red flags:  None as reported by patient.             Current occupation is retired.                     Therapist Generated HPI Evaluation         Type of problem:  Bilateral hips (and B knees).      Condition occurred with:  Insidious onset.  Where condition occurred: at home.    Pain is described as aching and is constant.  Pain radiates to:  No radiation.   Since onset symptoms are unchanged.  Associated symptoms:  Loss of motion/stiffness. Exacerbated by: activity, steps.  and relieved by rest.                              Objective:  System         Lumbar/SI Evaluation  ROM:    AROM Lumbar:   Flexion:        Wnl  Ext:                    Min loss   Side Bend:        Left:     Right:   Rotation:           Left:     Right:   Side Glide:        Left:     Right:                                                               Hip Evaluation  HIP AROM:            Internal Rotation: Left: min loss    Right: min loss  External Rotation: Left: min loss    Right: min loss        Hip Strength:    Flexion:   Left: 4/5   +  Pain:  Right: 4/5   +  Pain:                    Extension:  Right: 4/5    Pain:    Abduction:  Left: 4/5     Pain:Right: 4/5    Pain:  Adduction:  Left: 4/5    Pain:                               General      ROS    Assessment/Plan:    Patient is a 81 year old male with B knee and both sides hip complaints.    Patient has the following significant findings with corresponding treatment plan.                Diagnosis 1:  B hip and B knee pain      Pain -  hot/cold therapy, manual therapy, self management, education and home program  Decreased ROM/flexibility - manual therapy and therapeutic exercise  Decreased strength - therapeutic exercise and therapeutic activities  Impaired muscle performance - neuro re-education  Decreased function - therapeutic activities    Therapy Evaluation Codes:   1) History comprised of:   Personal factors that impact the plan of care:      Age and Past/current experiences.    Comorbidity factors that impact the plan of care are:      Diabetes.     Medications impacting care: None.  2) Examination of Body Systems comprised of:   Body structures and functions that impact the plan of care:      Hip and Knee.   Activity limitations that impact the plan of care are:      Bending, Dressing, Lifting, Stairs and Walking.  3) Clinical presentation characteristics are:   Evolving/Changing.  4) Decision-Making    Low complexity using standardized patient assessment instrument and/or measureable assessment of functional outcome.  Cumulative Therapy Evaluation is: Low complexity.    Previous and current functional limitations:  (See Goal Flow Sheet for this information)    Short term and Long term goals: (See Goal Flow Sheet for this information)     Communication ability:  Patient appears to be able to clearly communicate and understand verbal and written communication and follow directions correctly.  Treatment Explanation - The following has been discussed with the patient:   RX ordered/plan of care  Anticipated outcomes  Possible risks and side effects  This patient would benefit from PT intervention to resume normal activities.   Rehab potential is good.    Frequency:  1 X week, once  daily  Duration:  for 6 weeks  Discharge Plan:  Achieve all LTG.  Independent in home treatment program.  Reach maximal therapeutic benefit.    Please refer to the daily flowsheet for treatment today, total treatment time and time spent performing 1:1 timed codes.

## 2021-10-15 RX ORDER — INSULIN LISPRO 100 [IU]/ML
INJECTION, SOLUTION INTRAVENOUS; SUBCUTANEOUS
Qty: 60 ML | Refills: 0 | Status: SHIPPED | OUTPATIENT
Start: 2021-10-15 | End: 2021-11-10

## 2021-10-18 RX ORDER — PEN NEEDLE, DIABETIC 32GX 5/32"
NEEDLE, DISPOSABLE MISCELLANEOUS
Qty: 300 EACH | Refills: 5 | Status: SHIPPED | OUTPATIENT
Start: 2021-10-18 | End: 2022-08-02

## 2021-10-20 DIAGNOSIS — I30.9 ACUTE PERICARDIAL EFFUSION: Primary | ICD-10-CM

## 2021-10-20 RX ORDER — COLCHICINE 0.6 MG/1
0.3 TABLET ORAL 2 TIMES DAILY
Qty: 14 TABLET | Refills: 0 | Status: SHIPPED | OUTPATIENT
Start: 2021-10-20 | End: 2022-02-08

## 2021-10-20 NOTE — PROGRESS NOTES
Benji returned call and confirmed cards appts cancelled and that his Cardiology care will be through [place].    Ita Oneal on 10/20/2021 at 5:04 PM

## 2021-10-20 NOTE — PROGRESS NOTES
Spoke with Wife, Marcie (No C2C), requested Benji to call back about cardiology appointment cancellations. Gave wife direct line.    Ita Oneal on 10/20/2021 at 10:13 AM

## 2021-10-20 NOTE — PROGRESS NOTES
Covering for Klaudia Santa, PharmD. Reviewed her 10/13 MTM note. Received ok from Dr. Munson regarding colchicine Rx. Sent order to pharmacy and notified patient via [employer].    Alayna PiersonD, [facility]  Medication Therapy Management Pharmacist  Pager: [phone]

## 2021-11-08 ENCOUNTER — LAB (OUTPATIENT)
Dept: LAB | Facility: CLINIC | Age: 81
End: 2021-11-08
Payer: COMMERCIAL

## 2021-11-08 DIAGNOSIS — C91.10 CLL (CHRONIC LYMPHOCYTIC LEUKEMIA) (H): ICD-10-CM

## 2021-11-08 LAB
ALBUMIN SERPL-MCNC: 3.3 G/DL (ref 3.4–5)
ALP SERPL-CCNC: 57 U/L (ref 40–150)
ALT SERPL W P-5'-P-CCNC: 50 U/L (ref 0–70)
ANION GAP SERPL CALCULATED.3IONS-SCNC: 7 MMOL/L (ref 3–14)
AST SERPL W P-5'-P-CCNC: 25 U/L (ref 0–45)
BASOPHILS # BLD AUTO: 0 10E3/UL (ref 0–0.2)
BASOPHILS NFR BLD AUTO: 0 %
BILIRUB SERPL-MCNC: 0.8 MG/DL (ref 0.2–1.3)
BUN SERPL-MCNC: 24 MG/DL (ref 7–30)
CALCIUM SERPL-MCNC: 8.9 MG/DL (ref 8.5–10.1)
CHLORIDE BLD-SCNC: 108 MMOL/L (ref 94–109)
CO2 SERPL-SCNC: 24 MMOL/L (ref 20–32)
CREAT SERPL-MCNC: 1.5 MG/DL (ref 0.66–1.25)
EOSINOPHIL # BLD AUTO: 0 10E3/UL (ref 0–0.7)
EOSINOPHIL NFR BLD AUTO: 0 %
ERYTHROCYTE [DISTWIDTH] IN BLOOD BY AUTOMATED COUNT: 18 % (ref 10–15)
GFR SERPL CREATININE-BSD FRML MDRD: 44 ML/MIN/1.73M2
GLUCOSE BLD-MCNC: 117 MG/DL (ref 70–99)
HCT VFR BLD AUTO: 35.4 % (ref 40–53)
HGB BLD-MCNC: 11.1 G/DL (ref 13.3–17.7)
IMM GRANULOCYTES # BLD: 0 10E3/UL
IMM GRANULOCYTES NFR BLD: 0 %
LDH SERPL L TO P-CCNC: 245 U/L (ref 85–227)
LYMPHOCYTES # BLD AUTO: 2.6 10E3/UL (ref 0.8–5.3)
LYMPHOCYTES NFR BLD AUTO: 51 %
MCH RBC QN AUTO: 30.1 PG (ref 26.5–33)
MCHC RBC AUTO-ENTMCNC: 31.4 G/DL (ref 31.5–36.5)
MCV RBC AUTO: 96 FL (ref 78–100)
MONOCYTES # BLD AUTO: 0.3 10E3/UL (ref 0–1.3)
MONOCYTES NFR BLD AUTO: 6 %
NEUTROPHILS # BLD AUTO: 2.2 10E3/UL (ref 1.6–8.3)
NEUTROPHILS NFR BLD AUTO: 43 %
NRBC # BLD AUTO: 0 10E3/UL
NRBC BLD AUTO-RTO: 0 /100
PLATELET # BLD AUTO: 55 10E3/UL (ref 150–450)
POTASSIUM BLD-SCNC: 4.2 MMOL/L (ref 3.4–5.3)
PROT SERPL-MCNC: 6.6 G/DL (ref 6.8–8.8)
RBC # BLD AUTO: 3.69 10E6/UL (ref 4.4–5.9)
SODIUM SERPL-SCNC: 139 MMOL/L (ref 133–144)
WBC # BLD AUTO: 5.2 10E3/UL (ref 4–11)

## 2021-11-08 PROCEDURE — 85025 COMPLETE CBC W/AUTO DIFF WBC: CPT

## 2021-11-08 PROCEDURE — 83615 LACTATE (LD) (LDH) ENZYME: CPT

## 2021-11-08 PROCEDURE — 36415 COLL VENOUS BLD VENIPUNCTURE: CPT

## 2021-11-08 PROCEDURE — 80053 COMPREHEN METABOLIC PANEL: CPT

## 2021-11-10 ENCOUNTER — VIRTUAL VISIT (OUTPATIENT)
Dept: PHARMACY | Facility: CLINIC | Age: 81
End: 2021-11-10
Payer: COMMERCIAL

## 2021-11-10 DIAGNOSIS — E11.69 TYPE 2 DIABETES MELLITUS WITH OTHER SPECIFIED COMPLICATION, WITH LONG-TERM CURRENT USE OF INSULIN (H): Primary | ICD-10-CM

## 2021-11-10 DIAGNOSIS — I30.9 ACUTE PERICARDIAL EFFUSION: ICD-10-CM

## 2021-11-10 DIAGNOSIS — Z79.4 TYPE 2 DIABETES MELLITUS WITH OTHER SPECIFIED COMPLICATION, WITH LONG-TERM CURRENT USE OF INSULIN (H): Primary | ICD-10-CM

## 2021-11-10 PROCEDURE — 99606 MTMS BY PHARM EST 15 MIN: CPT | Performed by: PHARMACIST

## 2021-11-10 PROCEDURE — 99607 MTMS BY PHARM ADDL 15 MIN: CPT | Performed by: PHARMACIST

## 2021-11-10 RX ORDER — INSULIN LISPRO 100 [IU]/ML
INJECTION, SOLUTION INTRAVENOUS; SUBCUTANEOUS
Qty: 60 ML | Refills: 0
Start: 2021-11-10 | End: 2022-02-15

## 2021-11-10 NOTE — PROGRESS NOTES
Medication Therapy Management (MTM) Encounter    ASSESSMENT:                            Medication Adherence/Access: No issues identified    Acute pericarditis with acute pericardial effusion: stable.    Type 2 Diabetes: patient to continue working off Humalog, may not be needed any longer since off prednisone now.    PLAN:                            1. Encouraged patient to continue to wean off Humalog, decrease scale by another 2 units every 4-7 days until off. If blood sugar do elevate over 150 then can trial adjusting basal insulin slightly.    Follow-up: Return in about 6 months (around 5/10/2022) for Medication Therapy Telephone Visit.      SUBJECTIVE/OBJECTIVE:                          Austin Garza is a 81 year old male called for a follow-up visit. He was referred to me from Dr. Munson.  Today's visit is a follow-up MTM visit from 10/13/21.     Reason for visit: diabetes follow-up. Reports blood sugar improved, doing wel.    Allergies/ADRs: Reviewed in chart  Past Medical History: Reviewed in chart  Tobacco: He reports that he quit smoking about 46 years ago. He has a 10.00 pack-year smoking history. He has quit using smokeless tobacco.  Alcohol: not currently using regularly      Medication Adherence/Access: no issues reported    Acute pericarditis with acute pericardial effusion: he reports coming off prednisone last week. He has also stopped Bactrim. He continues on colchicine 0.3 mg twice daily (to be on for total 3 months). No concerns reported today.     Diabetes: patient reports his late evening snack has cut back since coming off prednisone. He noticed blood sugar are better too for this reason. He has cut back mealtime insulin for this reason. Taking Lantus 18 units daily (decreased last week sometime) and Humalog 4 units this morning (use to give 6 units), often forgets the lunch but sometimes gives later at the end of lunch.  Date FBG/ 2hours post Lunch/2hours post Dinner /2hours post    11/5 145  91 111    11/6 132 122 122    11/7 148      11/8 95 73 *denies any hypoglycemia 86    11/9 95 95 110    11/10 90      If he has a low he will have a couple drinks of Boost shake that he stores in the fridge  Lab Results   Component Value Date    A1C 7.0 07/30/2021    A1C 5.9 04/05/2021    A1C 6.7 01/15/2021    A1C 5.6 08/23/2020    A1C 5.5 07/14/2020    A1C 6.3 12/31/2019         Today's Vitals: There were no vitals taken for this visit.  ----------------      I spent 22 minutes with this patient today. All changes were made via collaborative practice agreement with Vanessa Munson MD. A copy of the visit note was provided to the patient's primary care provider.    The patient was sent via [employer] a summary of these recommendations.     Alayna RobertoD  Medication Therapy Management Pharmacist  Pager#: [phone]    Telemedicine Visit Details  Type of service:  Telephone visit  Start Time: 9:03 AM  End Time: 9:25 AM  Originating Location (patient location): [place]  Distant Location (provider location):  [facility]     Medication Therapy Recommendations  Diabetes mellitus, type 2 (H)    Current Medication: insulin lispro (HUMALOG KWIKPEN) 100 UNIT/ML (1 unit dial) KWIKPEN   Rationale: Dose too high - Dosage too high - Safety   Recommendation: Decrease Dose   Status: Accepted per CPA

## 2021-11-13 NOTE — PATIENT INSTRUCTIONS
Recommendations from today's MTM visit:                                                      1. You can decrease the Humalog scale by another 2 units, then after that trial off completely. If you notice blood sugar over 150 before eating then send me a message sooner we may just adjust the long-acting.    Follow-up: Return in about 6 months (around 5/10/2022) for Medication Therapy Telephone Visit.    It was great to speak with you today.  I value your experience and would be very thankful for your time with providing feedback on our clinic survey. You may receive a survey via email or text message in the next few days.     To schedule another MTM appointment, please call the clinic directly or you may call the MTM scheduling line at [phone] or toll-free at [phone].     My Clinical Pharmacist's contact information:                                                      Please feel free to contact me with any questions or concerns you have.      Klaudia Santa, PharmD  Medication Therapy Management Pharmacist  Pager#: [phone]

## 2021-11-15 ENCOUNTER — LAB (OUTPATIENT)
Dept: ONCOLOGY | Facility: CLINIC | Age: 81
End: 2021-11-15
Attending: INTERNAL MEDICINE
Payer: COMMERCIAL

## 2021-11-15 DIAGNOSIS — C91.10 CLL (CHRONIC LYMPHOCYTIC LEUKEMIA) (H): ICD-10-CM

## 2021-11-15 LAB
BASOPHILS # BLD AUTO: 0 10E3/UL (ref 0–0.2)
BASOPHILS NFR BLD AUTO: 0 %
EOSINOPHIL # BLD AUTO: 0 10E3/UL (ref 0–0.7)
EOSINOPHIL NFR BLD AUTO: 1 %
ERYTHROCYTE [DISTWIDTH] IN BLOOD BY AUTOMATED COUNT: 17.9 % (ref 10–15)
HCT VFR BLD AUTO: 35.1 % (ref 40–53)
HGB BLD-MCNC: 11 G/DL (ref 13.3–17.7)
IMM GRANULOCYTES # BLD: 0 10E3/UL
IMM GRANULOCYTES NFR BLD: 0 %
LYMPHOCYTES # BLD AUTO: 2 10E3/UL (ref 0.8–5.3)
LYMPHOCYTES NFR BLD AUTO: 50 %
MCH RBC QN AUTO: 29.9 PG (ref 26.5–33)
MCHC RBC AUTO-ENTMCNC: 31.3 G/DL (ref 31.5–36.5)
MCV RBC AUTO: 95 FL (ref 78–100)
MONOCYTES # BLD AUTO: 0.3 10E3/UL (ref 0–1.3)
MONOCYTES NFR BLD AUTO: 7 %
NEUTROPHILS # BLD AUTO: 1.7 10E3/UL (ref 1.6–8.3)
NEUTROPHILS NFR BLD AUTO: 42 %
NRBC # BLD AUTO: 0 10E3/UL
NRBC BLD AUTO-RTO: 0 /100
PLATELET # BLD AUTO: 59 10E3/UL (ref 150–450)
RBC # BLD AUTO: 3.68 10E6/UL (ref 4.4–5.9)
WBC # BLD AUTO: 4 10E3/UL (ref 4–11)

## 2021-11-15 PROCEDURE — 36415 COLL VENOUS BLD VENIPUNCTURE: CPT

## 2021-11-15 PROCEDURE — 85025 COMPLETE CBC W/AUTO DIFF WBC: CPT | Performed by: STUDENT IN AN ORGANIZED HEALTH CARE EDUCATION/TRAINING PROGRAM

## 2021-11-15 PROCEDURE — 36415 COLL VENOUS BLD VENIPUNCTURE: CPT | Performed by: STUDENT IN AN ORGANIZED HEALTH CARE EDUCATION/TRAINING PROGRAM

## 2021-11-23 ENCOUNTER — TELEPHONE (OUTPATIENT)
Dept: PEDIATRICS | Facility: CLINIC | Age: 81
End: 2021-11-23
Payer: COMMERCIAL

## 2021-11-23 NOTE — TELEPHONE ENCOUNTER
Reason for Call:  Medication or medication refill:    Do you use a [facility]?  Name of the pharmacy and phone number for the current request:  [facility] on [address]     Name of the medication requested: Humalog kwikpen 100 unit/ml patient needs a refill    Other request: wants a call back to discuss symptoms from medications     Can we leave a detailed message on this number? YES    Phone number patient can be reached at: Cell number on file:    Telephone Information:   Mobile [phone]       Best Time: any     Call taken on 11/23/2021 at 8:27 AM by Cristela Ag

## 2021-11-23 NOTE — TELEPHONE ENCOUNTER
Called pt to get details. He is requesting Dr.Emily Munson's opinion on the following.     1. Rashes:  - noticed rashes in multiple area since the hospitalization(end of Sep)  - had rash(red round spots) on R arm which has been faded & almost vanished  - but still has rashes on bilateral ankles, more bigger rash, very itchy still  - denies spreading, open sores, swelling or warm to touch  Wants to know whether the rash on ankles need treatment at this time?    2. Bruises:   - still has bruises on inj site on the abdomen  - most of the bruises are faded, still has couple of quarter size bruises  - denies pain, swelling, lump under the skin, discharge or warm to touch  - he is on plavix 75 mg daily  Advised pt that the bruises might go away soon & asked to monitor for now.     3. Dark stool:   - dark stool started while he was in the hospital(end of Sep), it got darker & darker since then  - last week stools turned to black  - is on plavix 75 mg daily & is currenltly taking iron supplement  - has CBC done through his oncologist() a week ago & his platelets are low  He is wondering whether the black stool & low platelets are related & worried about internal bleeding?     4. Weaning off Humalog:  - he is slowly weaning off from humalog as advised by MT & no concerns regarding that    Please advise. Need to call back pt after 2 pm(going out for lunch 12:30-1:30).     Component      Latest Ref Rng & Units 10/6/2021 11/8/2021 11/15/2021   Platelet Count      150 - 450 10e3/uL 137 (L) 55 (L) 59 (L)     Notes from MTM VV on 11/10/21:  Type 2 Diabetes: patient to continue working off Humalog, may not be needed any longer since off prednisone now.    Encouraged patient to continue to wean off Humalog, decrease scale by another 2 units every 4-7 days until off. If blood sugar do elevate over 150 then can trial adjusting basal insulin slightly.    Mayte RN  Patient Advocate Crista (PAL)  [facility]

## 2021-11-23 NOTE — TELEPHONE ENCOUNTER
1. It is hard to make recommendations about a rash without seeing it.  He should come in to be evaluated.  OK to see a resident or Conchita.     2. The bruises on the abdomen can take a long time to resolve, so this is not concerning.     3. I think Benji should probably come in to be seen. I didn't see a mention of dark stool on the discharge summary from September, and so if they have changed and are now black this is concerning for bleeding.  His platelets are stable (still above 50), and the hemoglobin is also stable so that is reassuring.     4. Glad to hear he is able to wean off the humalog.     Vanessa Munson MD   Internal Medicine - Pediatrics

## 2021-11-23 NOTE — TELEPHONE ENCOUNTER
Ita - pt wanted me to call him after 2 pm today. Can you please call pt after 2 pm & help schedule an appointment with Conchita tomorrow to take a look at the rashes on ankles & possible labs for the dark stool. Thanks.     Mayte RN  Patient Advocate Liason (PAL)  [facility]

## 2021-11-24 ENCOUNTER — OFFICE VISIT (OUTPATIENT)
Dept: PEDIATRICS | Facility: CLINIC | Age: 81
End: 2021-11-24
Payer: COMMERCIAL

## 2021-11-24 VITALS
OXYGEN SATURATION: 99 % | DIASTOLIC BLOOD PRESSURE: 78 MMHG | SYSTOLIC BLOOD PRESSURE: 144 MMHG | BODY MASS INDEX: 31.07 KG/M2 | TEMPERATURE: 98 F | WEIGHT: 210.4 LBS | HEART RATE: 86 BPM

## 2021-11-24 DIAGNOSIS — R21 RASH: ICD-10-CM

## 2021-11-24 DIAGNOSIS — D69.6 THROMBOCYTOPENIA (H): ICD-10-CM

## 2021-11-24 DIAGNOSIS — R19.5 DARK STOOLS: Primary | ICD-10-CM

## 2021-11-24 PROCEDURE — 99213 OFFICE O/P EST LOW 20 MIN: CPT | Performed by: PHYSICIAN ASSISTANT

## 2021-11-24 NOTE — PROGRESS NOTES
Assessment & Plan     Dark stools  Resolved. If symptoms return may complete FIT.  - Fecal colorectal cancer screen (FIT); Future    Rash  Skin changes associated with edema.     Thrombocytopenia (H)  Persistent and stable.     JESSICA Gallego [facility] [place] is a 81 year old who presents for the following health issues:    HPI   Rash  Onset/Duration: 2 weeks  Description  Location: bilat legs  Character: round, blotchy, red  Itching: mild  Intensity:  mild  Progression of Symptoms:  same  Accompanying signs and symptoms:   Fever: no  Body aches or joint pain: no  Sore throat symptoms: no  Recent cold symptoms: no  History:           Previous episodes of similar rash: no  New exposures:  None  Recent travel: no  Exposure to similar rash: no  Precipitating or alleviating factors: none  Therapies tried and outcome: none    Dark stools  History of thrombocytopenia--platelets 54 last week  Onset/Duration: 3 weeks  Description:   Carolyn-anal lump: no  Pain: no  Itching: no  Accompanying Signs & Symptoms:  Blood in stool: no just darker color/black  Changes in stool pattern: no  History:   Any previous GI studies done:none  Family History of colon cancer: cousin  5 years ago from colon cancer  Precipitating factors:   None  Alleviating factors:  None  Therapies tried and outcome: none  Improved this am.     Hip and knee pain x months. Walking upstairs painful.     Review of Systems   Constitutional, HEENT, cardiovascular, pulmonary, gi and gu systems are negative, except as otherwise noted.      Objective    BP (!) 144/78 (BP Location: Right arm, Patient Position: Sitting, Cuff Size: Adult Large)   Pulse 86   Temp 98  F (36.7  C) (Tympanic)   Wt 95.4 kg (210 lb 6.4 oz)   SpO2 99%   BMI 31.07 kg/m    Body mass index is 31.07 kg/m .  Physical Exam   GENERAL: alert and no distress  NECK: no adenopathy  RESP: lungs clear to auscultation - no rales, rhonchi or  wheezes  CV: regular rate and rhythm, normal S1 S2, no S3 or S4  SKIN: inspection of the lower legs reveals mild erythema bilaterally--diffuse. Also, edema bilaterally. Warm to touch. Pulses intact.    No results found for any visits on 11/24/21.

## 2021-11-29 ENCOUNTER — TELEPHONE (OUTPATIENT)
Dept: PEDIATRICS | Facility: CLINIC | Age: 81
End: 2021-11-29
Payer: COMMERCIAL

## 2021-11-29 DIAGNOSIS — R19.5 DARK STOOLS: ICD-10-CM

## 2021-11-29 NOTE — TELEPHONE ENCOUNTER
Pt LM on 11/26 at 2:11 pm that he is returning Dr.Emily Munson's call. Call back number is [phone].    Called pt back. He denies any new sx's. No dark stool since Thursday till this morning. This morning's BM was little darker, but denies any acute worsening sx's.     He is planning to  the FIT test packet from lab this afternoon to bring it home & get it done.    No other question or concerns from pt.    Mayte, RN  Patient Advocate Liason (PAL)  [facility]

## 2021-11-30 DIAGNOSIS — E11.649 TYPE 2 DIABETES MELLITUS WITH HYPOGLYCEMIA WITHOUT COMA, WITHOUT LONG-TERM CURRENT USE OF INSULIN (H): ICD-10-CM

## 2021-11-30 DIAGNOSIS — N18.31 STAGE 3A CHRONIC KIDNEY DISEASE (H): ICD-10-CM

## 2021-11-30 LAB — HEMOCCULT STL QL IA: POSITIVE

## 2021-11-30 PROCEDURE — 82274 ASSAY TEST FOR BLOOD FECAL: CPT

## 2021-12-01 ENCOUNTER — MYC MEDICAL ADVICE (OUTPATIENT)
Dept: PEDIATRICS | Facility: CLINIC | Age: 81
End: 2021-12-01
Payer: COMMERCIAL

## 2021-12-01 DIAGNOSIS — K92.1 HEMATOCHEZIA: Primary | ICD-10-CM

## 2021-12-01 NOTE — TELEPHONE ENCOUNTER
Yes, his FIT is POSITIVE. I haven't had a chance to review my results this quickly.     Colonoscopy recommended. Orders placed for [facility].      Oral Fisher PA-C

## 2021-12-01 NOTE — TELEPHONE ENCOUNTER
Please review pt's MC message.     Pt was seen by Conchita on 11/24/21 for dark stools & FIT test was ordered. Test came back positive. Please advise.     Component      Latest Ref Rng & Units 11/30/2021   Occult Blood Scn FIT      Negative Positive (A)     Mayte, RN  Patient Advocate Liason (PAL)  [facility]

## 2021-12-01 NOTE — TELEPHONE ENCOUNTER
Called pt at [phone], not available. So, sent MC message with Conchita's recommendation.     [facility]   [address] [address]   Phone: [phone]     Mayte, RN  Patient Advocate Liason (PAL)  [facility]

## 2021-12-01 NOTE — TELEPHONE ENCOUNTER
Routing refill request to provider for review/approval because:  Drug not active on patient's medication list  Confirm MTM discontinued due to med reconciliation  Alda Esquivel, RN, BSN  Message handled by CLINIC NURSE.

## 2021-12-06 ENCOUNTER — LAB (OUTPATIENT)
Dept: ONCOLOGY | Facility: CLINIC | Age: 81
End: 2021-12-06
Attending: INTERNAL MEDICINE
Payer: COMMERCIAL

## 2021-12-06 DIAGNOSIS — C91.10 CLL (CHRONIC LYMPHOCYTIC LEUKEMIA) (H): ICD-10-CM

## 2021-12-06 LAB
BASOPHILS # BLD AUTO: 0 10E3/UL (ref 0–0.2)
BASOPHILS NFR BLD AUTO: 0 %
EOSINOPHIL # BLD AUTO: 0 10E3/UL (ref 0–0.7)
EOSINOPHIL NFR BLD AUTO: 1 %
ERYTHROCYTE [DISTWIDTH] IN BLOOD BY AUTOMATED COUNT: 17.4 % (ref 10–15)
HCT VFR BLD AUTO: 36.1 % (ref 40–53)
HGB BLD-MCNC: 11.7 G/DL (ref 13.3–17.7)
IMM GRANULOCYTES # BLD: 0 10E3/UL
IMM GRANULOCYTES NFR BLD: 0 %
LYMPHOCYTES # BLD AUTO: 2 10E3/UL (ref 0.8–5.3)
LYMPHOCYTES NFR BLD AUTO: 43 %
MCH RBC QN AUTO: 31.4 PG (ref 26.5–33)
MCHC RBC AUTO-ENTMCNC: 32.4 G/DL (ref 31.5–36.5)
MCV RBC AUTO: 97 FL (ref 78–100)
MONOCYTES # BLD AUTO: 0.4 10E3/UL (ref 0–1.3)
MONOCYTES NFR BLD AUTO: 8 %
NEUTROPHILS # BLD AUTO: 2.3 10E3/UL (ref 1.6–8.3)
NEUTROPHILS NFR BLD AUTO: 48 %
NRBC # BLD AUTO: 0 10E3/UL
NRBC BLD AUTO-RTO: 0 /100
PLATELET # BLD AUTO: 81 10E3/UL (ref 150–450)
RBC # BLD AUTO: 3.73 10E6/UL (ref 4.4–5.9)
WBC # BLD AUTO: 4.7 10E3/UL (ref 4–11)

## 2021-12-06 PROCEDURE — 36415 COLL VENOUS BLD VENIPUNCTURE: CPT

## 2021-12-06 PROCEDURE — 85025 COMPLETE CBC W/AUTO DIFF WBC: CPT | Performed by: INTERNAL MEDICINE

## 2021-12-07 ENCOUNTER — TELEPHONE (OUTPATIENT)
Dept: ONCOLOGY | Facility: CLINIC | Age: 81
End: 2021-12-07

## 2021-12-07 ENCOUNTER — ONCOLOGY VISIT (OUTPATIENT)
Dept: ONCOLOGY | Facility: CLINIC | Age: 81
End: 2021-12-07
Attending: INTERNAL MEDICINE
Payer: COMMERCIAL

## 2021-12-07 VITALS
BODY MASS INDEX: 30.96 KG/M2 | TEMPERATURE: 97.6 F | HEIGHT: 69 IN | WEIGHT: 209 LBS | RESPIRATION RATE: 18 BRPM | HEART RATE: 83 BPM | DIASTOLIC BLOOD PRESSURE: 88 MMHG | OXYGEN SATURATION: 99 % | SYSTOLIC BLOOD PRESSURE: 155 MMHG

## 2021-12-07 DIAGNOSIS — C91.10 CLL (CHRONIC LYMPHOCYTIC LEUKEMIA) (H): Primary | ICD-10-CM

## 2021-12-07 PROCEDURE — 99214 OFFICE O/P EST MOD 30 MIN: CPT | Performed by: INTERNAL MEDICINE

## 2021-12-07 PROCEDURE — G0463 HOSPITAL OUTPT CLINIC VISIT: HCPCS

## 2021-12-07 ASSESSMENT — PAIN SCALES - GENERAL: PAINLEVEL: NO PAIN (0)

## 2021-12-07 ASSESSMENT — MIFFLIN-ST. JEOR: SCORE: 1653.4

## 2021-12-07 NOTE — NURSING NOTE
"Oncology Rooming Note    December 7, 2021 10:21 AM   Austin Garza is a 81 year old male who presents for:    Chief Complaint   Patient presents with     Oncology Clinic Visit     Chronic lymphocytic leukemia (CLL), B-cell      Initial Vitals: BP (!) 155/88   Pulse 83   Temp 97.6  F (36.4  C) (Tympanic)   Resp 18   Ht 1.753 m (5' 9\")   Wt 94.8 kg (209 lb)   SpO2 99%   BMI 30.86 kg/m   Estimated body mass index is 30.86 kg/m  as calculated from the following:    Height as of this encounter: 1.753 m (5' 9\").    Weight as of this encounter: 94.8 kg (209 lb). Body surface area is 2.15 meters squared.  No Pain (0) Comment: Data Unavailable   No LMP for male patient.  Allergies reviewed: Yes  Medications reviewed: Yes    Medications: Medication refills not needed today.  Pharmacy name entered into [employer]: [id number] - [place] - [address] [facility]    Clinical concerns: f/u - discuss sleep issues and weakness      Geetha Benítez CMA              "

## 2021-12-07 NOTE — TELEPHONE ENCOUNTER
Free Drug Application Initiated  Medication: Imbruvica 420mg  Sponsor: [employer]  Phone # [phone]  Fax # [phone]  Additional Information: Spoke to patient they are going to stop for now. Will wait to hear patient to call back. Application is filled out if we need it

## 2021-12-07 NOTE — LETTER
12/7/2021         RE: Austin Garza  [address] [place] [address]        Dear Colleague,    Thank you for referring your patient, Austin Garza, to the [facility]. Please see a copy of my visit note below.    [facility]    Hematology/Oncology Established Patient Note      Today's Date: 12/07/21    Reason for Follow-up: CLL      HISTORY OF PRESENT ILLNESS: Austin Garza is a 81 year old male with PMHx of DMII, HTN who presented with leukocytosis.  In November 2017, he was found to have elevated WBC of 61.7K, hemoglobin of 10.4, and platelet of 115K.  There were no other CBC results available between 2010 and 2017.  Prior to 2010, he had normal CBC, with normal to mild anemia, and mild thrombocytopenia.   He was asymptomatic.    He underwent bone marrow biopsy on 11/21/17, which was consistent with chronic lymphocytic leukemia/small lymphocytic lymphoma, with 13% ringed sideroblasts identified.  The presence of increased numbers of ringed sideroblasts raises the possibility of an associated myelodysplastic syndrome.  Dysplastic changes are not identified though.  Many cases exhibiting ringed sideroblasts are metabolic origin, without significant risk of progression to acute leukemia, and these typically do not show dysplastic morphology as is the case with this specimen.  15% ringed sideroblasts are required for a diagnosis for RARS, which this specimen does not meet.  Flow cytometry is also consistent with CLL/SLL.  Cytogenetics show two (10%) of the metaphase cells comprise a clone characterized by a deletion within the proximal long arm of a chromosome 13 as the sole karyotypic abnormality.  Three (15%) metaphases had loss of the Y chromosome as the sole abnormality.    CT scan on 11/29/17 shows mildly enlarged left axillary lymph node and periaortic lymph nodes in the retroperitoneum of the abdomen.  Spleen is also enlarged.    On  his staging CT scan for CLL, he was incidentally found to have a large infrarenal abdominal aortic aneurysm, measuring 7.6 cm.  He was seen by Dr. Thomas, and he underwent EVAR on 12/4/17.     He started ibrutinib on 5/4/20.  It was held 5/28/20-6/4/20 for tooth extraction.    He was hospitalized on 7/30/2021 for shortness of breath, new CHF, A. fib with RVR and ibrutinib was put on hold.  He was seen by cardiology and eventually underwent cardioversion for the A. fib.  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  He was started on Eliquis and metoprolol.  His Plavix was stopped.      INTERIM HISTORY: Austin is here for follow-up.  He went to the [facility] ED on 9/26/21 and found to have pericardial effusion.  Due to lack of beds, he was transferred to [facility] for acute pericardial effusion and pericarditis.  He was started on colchicine and prednisone taper x 6 weeks.  He was also given Bactrim for PJP prophylaxis due duration of prednisone course.  He was discharged on 10/1/21.  Benji says that he had some side effects from the steroids, but he is feeling better now since he completed that.         REVIEW OF SYSTEMS:   14 point ROS was reviewed and is negative other than as noted above in HPI.       HOME MEDICATIONS:  Current Outpatient Medications   Medication Sig Dispense Refill     bacitracin 500 UNIT/GM OINT Apply 1 Application topically as needed       blood glucose (CONTOUR NEXT TEST) test strip Use to test blood sugar 3-4 times daily or as directed. 100 strip 1     blood glucose monitoring (NO BRAND SPECIFIED) meter device kit Use to test blood sugar 2 times daily or as directed. 1 kit 0     clopidogrel (PLAVIX) 75 MG tablet Take 1 tablet (75 mg) by mouth daily 90 tablet 3     colchicine (COLCYRS) 0.6 MG tablet Take 0.5 tablets (0.3 mg) by mouth 2 times daily 14 tablet 0     dutasteride (AVODART) 0.5 MG capsule Take 0.5 mg by mouth       empagliflozin (JARDIANCE) 10 MG TABS tablet  Take 1 tablet (10 mg) by mouth daily 90 tablet 3     ferrous fumarate 65 mg, [place]. FE,-Vitamin C 125 mg (VITRON-C)  MG TABS tablet Take 65 mg of iron by mouth       insulin glargine (LANTUS PEN) 100 UNIT/ML pen Inject 18 Units Subcutaneous daily 30 mL 0     insulin lispro (HUMALOG KWIKPEN) 100 UNIT/ML (1 unit dial) KWIKPEN 2-4 units before meals 60 mL 0     insulin pen needle (BD SHRUTHI U/F) 32G X 4 MM miscellaneous Use 4 daily as directed. 300 each 5     metoprolol succinate ER (TOPROL-XL) 50 MG 24 hr tablet Take 50 mg by mouth daily        rosuvastatin (CRESTOR) 20 MG tablet Take 1 tablet (20 mg) by mouth daily 90 tablet 3     tamsulosin (FLOMAX) 0.4 MG capsule Take 1 capsule (0.4 mg) by mouth daily 90 capsule 0         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      CLL (chronic lymphocytic leukemia)      Diaphragmatic hernia      Diverticulitis of colon      Esophageal reflux      Essential hypertension      Hyperlipidemia      Irritable bowel syndrome      Macular degeneration (senile) of retina      Mumps      Squamous Cell Carcinoma, Back 1988     Type 2 diabetes mellitus          PAST SURGICAL HISTORY:  Past Surgical History:   Procedure Laterality Date     ANESTHESIA CARDIOVERSION N/A 8/2/2021    Procedure: ANESTHESIA, FOR CARDIOVERSION;  Surgeon: GENERIC ANESTHESIA PROVIDER;  Location: [facility] OR     APPENDECTOMY OPEN  1966     BONE MARROW BIOPSY, BONE SPECIMEN, NEEDLE/TROCAR N/A 11/21/2017    Procedure: BIOPSY BONE MARROW;  BONE MARROW BIOPSY;  Surgeon: Shady Garcia MD;  Location:  GI     COLONOSCOPY  2002     ENDOVASCULAR REPAIR ANEURYSM ABDOMINAL AORTA N/A 12/4/2017    Procedure: ENDOVASCULAR REPAIR ANEURYSM ABDOMINAL AORTA;  ENDOVASCULAR REPAIR ANEURYSM ABDOMINAL AORTA;  Surgeon: Milton Cazares  MD Michael;  Location: [facility] OR     Fistulotomy with marsupialization for repair of fisture in ano       IR ABDOMINAL AORTOGRAM  3/11/2019     IR VISCERAL EMBOLIZATION  2019     Multiple skin tags - resection  1998     Squamous cell skin cancer resection - back       VASECTOMY           SOCIAL HISTORY:  Social History     Socioeconomic History     Marital status:      Spouse name: librado     Number of children: 0     Years of education: 17     Highest education level: Not on file   Occupational History     Occupation: retired   Tobacco Use     Smoking status: Former Smoker     Packs/day: 0.50     Years: 20.00     Pack years: 10.00     Quit date: 1975     Years since quittin.9     Smokeless tobacco: Former User   Substance and Sexual Activity     Alcohol use: Yes     Alcohol/week: 10.0 - 14.0 standard drinks     Types: 10 - 14 Standard drinks or equivalent per week     Comment: 3 drinks a day/wine     Drug use: No     Sexual activity: Never   Other Topics Concern     Parent/sibling w/ CABG, MI or angioplasty before 65F 55M? Not Asked   Social History Narrative     Not on file     Social Determinants of Health     Financial Resource Strain: Not on file   Food Insecurity: Not on file   Transportation Needs: Not on file   Physical Activity: Not on file   Stress: Not on file   Social Connections: Not on file   Intimate Partner Violence: Not At Risk     Fear of Current or Ex-Partner: No     Emotionally Abused: No     Physically Abused: No     Sexually Abused: No   Housing Stability: Not on file   He quit smoking in .  He drinks 1-3 glasses of wine a night with dinner.  He is retired, and used to work as a .  He lives with his wife in [place].  His cousin had lung cancer and  around age 69.  Otherwise, he denies family history of cancer.        FAMILY HISTORY:  Family History   Problem Relation Age of Onset     Cerebrovascular Disease Father         x 2     Hypertension Mother       "C.A.D. Mother      Cancer Mother         skin     Eye Disorder Mother         glaucoma     Prostate Cancer No family hx of      Cancer - colorectal No family hx of      Glaucoma No family hx of      Macular Degeneration No family hx of          PHYSICAL EXAM:  BP (!) 155/88   Pulse 83   Temp 97.6  F (36.4  C) (Tympanic)   Resp 18   Ht 1.753 m (5' 9\")   Wt 94.8 kg (209 lb)   SpO2 99%   BMI 30.86 kg/m    ECO  GENERAL/CONSTITUTIONAL: No acute distress.  EYES: No scleral icterus.  NEUROLOGIC: Alert, oriented, answers questions appropriately.  INTEGUMENTARY: No jaundice.    GAIT: Steady, does not use assistive device      LABS:  CBC RESULTS: Recent Labs   Lab Test 21  0959   WBC 4.7   RBC 3.73*   HGB 11.7*   HCT 36.1*   MCV 97   MCH 31.4   MCHC 32.4   RDW 17.4*   PLT 81*           ASSESSMENT/PLAN:  Austin Garza is a 81 year old male with:    1) CLL/SLL: BLACKWOOD stage III, with lymphocytosis, lymphadenopathy, splenomegaly, and anemia.  He has mild thrombocytopenia as well, but is above 100K.  He presented with leukocytosis with WBC of 61.7K, hemoglobin of 10.4, and platelet count of 115K on diagnosis.  Bone marrow biopsy and flow cytometry confirmed CLL/SLL.  CT scan shows mildly enlarged left axillary lymph node and periaortic lymph nodes in the retroperitoneum of the abdomen, with enlarged spleen.      Due to worsening lymphocyte count, anemia, thrombocytopenia, as well as fatigue and night sweats, he started ibrutinib on 20.      His WBC is normalized now, with stable hemoglobin and platelet count.    In light of his recent issues with atrial fibrillation and now on Eliquis, his ibrutinib has been on hold since 2021.  Considering his CLL is under good control, his WBC/ is actually on the lower end of normal, and with risk of atrial fibrillation and bleeding on anticoagulants, we will continue to hold ibrutinib for now and monitor his counts.  He will likely need to resume treatment at some " point, but we can follow the counts and we could also consider other treatments as well for CLL.      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 He completed radiation at [facility] with planned 60 Gy x 30 fractions. He has completed radiation and noted that he did not need any more follow-up for this.    3) Abdominal aortic aneurysm: measures up to 7.6 cm on CT scan, incidentally found.  He underwent EVAR on 12/4/17.  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  He is on Plavix now.  On 5/13/19, he underwent and percutaneous aneurysm sac puncture and endo leak embolization by IR on 5/13/19.  -follow-up with vascular surgery and IR    4) Diabetes, hypertension:    -following with PCP    5) Atrial fibrillation with RVR s/p successful DCCV to SR:  -on metoprolol and Eliquis  -following with cardiology    6) CAD: He had an abnormal stress test.  He will follow-up with cardiology, who recommended CT coronoary angiogram as outpatient and follow-up in cardiology clinic    7) Thrombocytopenia: stable.  It had decreased after his [facility] hospitalization, may have been from his acute illness and  Bactrim.  Platelet count has improved and around his baseline now.  -monitor CBC    8) Recent hospitalization for pericarditis, pericardial effusion at [facility]:  -he has outpatient follow-up with cardiology      Breana Perry MD  Hematology/Oncology  [facility]      Total time spent on day of visit, including review of tests, obtaining/reviewing separately obtained history, ordering medications/tests/procedures, communicating with PCP/consultants, and documenting in electronic medical record: 30 minutes      Again, thank you for allowing me to participate in the care  of your patient.        Sincerely,        Breana Perry MD

## 2021-12-07 NOTE — PROGRESS NOTES
[facility]    Hematology/Oncology Established Patient Note      Today's Date: 12/07/21    Reason for Follow-up: CLL      HISTORY OF PRESENT ILLNESS: Austin Garza is a 81 year old male with PMHx of DMII, HTN who presented with leukocytosis.  In November 2017, he was found to have elevated WBC of 61.7K, hemoglobin of 10.4, and platelet of 115K.  There were no other CBC results available between 2010 and 2017.  Prior to 2010, he had normal CBC, with normal to mild anemia, and mild thrombocytopenia.   He was asymptomatic.    He underwent bone marrow biopsy on 11/21/17, which was consistent with chronic lymphocytic leukemia/small lymphocytic lymphoma, with 13% ringed sideroblasts identified.  The presence of increased numbers of ringed sideroblasts raises the possibility of an associated myelodysplastic syndrome.  Dysplastic changes are not identified though.  Many cases exhibiting ringed sideroblasts are metabolic origin, without significant risk of progression to acute leukemia, and these typically do not show dysplastic morphology as is the case with this specimen.  15% ringed sideroblasts are required for a diagnosis for RARS, which this specimen does not meet.  Flow cytometry is also consistent with CLL/SLL.  Cytogenetics show two (10%) of the metaphase cells comprise a clone characterized by a deletion within the proximal long arm of a chromosome 13 as the sole karyotypic abnormality.  Three (15%) metaphases had loss of the Y chromosome as the sole abnormality.    CT scan on 11/29/17 shows mildly enlarged left axillary lymph node and periaortic lymph nodes in the retroperitoneum of the abdomen.  Spleen is also enlarged.    On his staging CT scan for CLL, he was incidentally found to have a large infrarenal abdominal aortic aneurysm, measuring 7.6 cm.  He was seen by Dr. Thomas, and he underwent EVAR on 12/4/17.     He started ibrutinib on 5/4/20.  It was held 5/28/20-6/4/20 for tooth  extraction.    He was hospitalized on 7/30/2021 for shortness of breath, new CHF, A. fib with RVR and ibrutinib was put on hold.  He was seen by cardiology and eventually underwent cardioversion for the A. fib.  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  He was started on Eliquis and metoprolol.  His Plavix was stopped.      INTERIM HISTORY: Austin is here for follow-up.  He went to the [facility] ED on 9/26/21 and found to have pericardial effusion.  Due to lack of beds, he was transferred to [facility] for acute pericardial effusion and pericarditis.  He was started on colchicine and prednisone taper x 6 weeks.  He was also given Bactrim for PJP prophylaxis due duration of prednisone course.  He was discharged on 10/1/21.  Benji says that he had some side effects from the steroids, but he is feeling better now since he completed that.         REVIEW OF SYSTEMS:   14 point ROS was reviewed and is negative other than as noted above in HPI.       HOME MEDICATIONS:  Current Outpatient Medications   Medication Sig Dispense Refill     bacitracin 500 UNIT/GM OINT Apply 1 Application topically as needed       blood glucose (CONTOUR NEXT TEST) test strip Use to test blood sugar 3-4 times daily or as directed. 100 strip 1     blood glucose monitoring (NO BRAND SPECIFIED) meter device kit Use to test blood sugar 2 times daily or as directed. 1 kit 0     clopidogrel (PLAVIX) 75 MG tablet Take 1 tablet (75 mg) by mouth daily 90 tablet 3     colchicine (COLCYRS) 0.6 MG tablet Take 0.5 tablets (0.3 mg) by mouth 2 times daily 14 tablet 0     dutasteride (AVODART) 0.5 MG capsule Take 0.5 mg by mouth       empagliflozin (JARDIANCE) 10 MG TABS tablet Take 1 tablet (10 mg) by mouth daily 90 tablet 3     ferrous fumarate 65 mg, [place]. FE,-Vitamin C 125 mg (VITRON-C)  MG TABS tablet Take 65 mg of iron by mouth       insulin glargine (LANTUS PEN) 100 UNIT/ML pen Inject 18 Units Subcutaneous daily 30 mL 0      insulin lispro (HUMALOG KWIKPEN) 100 UNIT/ML (1 unit dial) KWIKPEN 2-4 units before meals 60 mL 0     insulin pen needle (BD SHRUTHI U/F) 32G X 4 MM miscellaneous Use 4 daily as directed. 300 each 5     metoprolol succinate ER (TOPROL-XL) 50 MG 24 hr tablet Take 50 mg by mouth daily        rosuvastatin (CRESTOR) 20 MG tablet Take 1 tablet (20 mg) by mouth daily 90 tablet 3     tamsulosin (FLOMAX) 0.4 MG capsule Take 1 capsule (0.4 mg) by mouth daily 90 capsule 0         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      CLL (chronic lymphocytic leukemia)      Diaphragmatic hernia      Diverticulitis of colon      Esophageal reflux      Essential hypertension      Hyperlipidemia      Irritable bowel syndrome      Macular degeneration (senile) of retina      Mumps      Squamous Cell Carcinoma, Back 1988     Type 2 diabetes mellitus          PAST SURGICAL HISTORY:  Past Surgical History:   Procedure Laterality Date     ANESTHESIA CARDIOVERSION N/A 8/2/2021    Procedure: ANESTHESIA, FOR CARDIOVERSION;  Surgeon: GENERIC ANESTHESIA PROVIDER;  Location: [facility] OR     APPENDECTOMY OPEN  1966     BONE MARROW BIOPSY, BONE SPECIMEN, NEEDLE/TROCAR N/A 11/21/2017    Procedure: BIOPSY BONE MARROW;  BONE MARROW BIOPSY;  Surgeon: Shady Garcia MD;  Location:  GI     COLONOSCOPY  2002     ENDOVASCULAR REPAIR ANEURYSM ABDOMINAL AORTA N/A 12/4/2017    Procedure: ENDOVASCULAR REPAIR ANEURYSM ABDOMINAL AORTA;  ENDOVASCULAR REPAIR ANEURYSM ABDOMINAL AORTA;  Surgeon: Milton Cazarse MD;  Location:  OR     Fistulotomy with marsupialization for repair of fisture in ano  2007     IR ABDOMINAL AORTOGRAM  3/11/2019     IR VISCERAL EMBOLIZATION  5/13/2019     Multiple skin tags - resection  1998     Squamous cell skin cancer resection -  back       VASECTOMY           SOCIAL HISTORY:  Social History     Socioeconomic History     Marital status:      Spouse name: librado     Number of children: 0     Years of education: 17     Highest education level: Not on file   Occupational History     Occupation: retired   Tobacco Use     Smoking status: Former Smoker     Packs/day: 0.50     Years: 20.00     Pack years: 10.00     Quit date: 1975     Years since quittin.9     Smokeless tobacco: Former User   Substance and Sexual Activity     Alcohol use: Yes     Alcohol/week: 10.0 - 14.0 standard drinks     Types: 10 - 14 Standard drinks or equivalent per week     Comment: 3 drinks a day/wine     Drug use: No     Sexual activity: Never   Other Topics Concern     Parent/sibling w/ CABG, MI or angioplasty before 65F 55M? Not Asked   Social History Narrative     Not on file     Social Determinants of Health     Financial Resource Strain: Not on file   Food Insecurity: Not on file   Transportation Needs: Not on file   Physical Activity: Not on file   Stress: Not on file   Social Connections: Not on file   Intimate Partner Violence: Not At Risk     Fear of Current or Ex-Partner: No     Emotionally Abused: No     Physically Abused: No     Sexually Abused: No   Housing Stability: Not on file   He quit smoking in .  He drinks 1-3 glasses of wine a night with dinner.  He is retired, and used to work as a .  He lives with his wife in [place].  His cousin had lung cancer and  around age 69.  Otherwise, he denies family history of cancer.        FAMILY HISTORY:  Family History   Problem Relation Age of Onset     Cerebrovascular Disease Father         x 2     Hypertension Mother      C.A.D. Mother      Cancer Mother         skin     Eye Disorder Mother         glaucoma     Prostate Cancer No family hx of      Cancer - colorectal No family hx of      Glaucoma No family hx of      Macular Degeneration No family hx of          PHYSICAL EXAM:  BP  "(!) 155/88   Pulse 83   Temp 97.6  F (36.4  C) (Tympanic)   Resp 18   Ht 1.753 m (5' 9\")   Wt 94.8 kg (209 lb)   SpO2 99%   BMI 30.86 kg/m    ECO  GENERAL/CONSTITUTIONAL: No acute distress.  EYES: No scleral icterus.  NEUROLOGIC: Alert, oriented, answers questions appropriately.  INTEGUMENTARY: No jaundice.    GAIT: Steady, does not use assistive device      LABS:  CBC RESULTS: Recent Labs   Lab Test 21  0959   WBC 4.7   RBC 3.73*   HGB 11.7*   HCT 36.1*   MCV 97   MCH 31.4   MCHC 32.4   RDW 17.4*   PLT 81*           ASSESSMENT/PLAN:  Austin Garza is a 81 year old male with:    1) CLL/SLL: BLACKWOOD stage III, with lymphocytosis, lymphadenopathy, splenomegaly, and anemia.  He has mild thrombocytopenia as well, but is above 100K.  He presented with leukocytosis with WBC of 61.7K, hemoglobin of 10.4, and platelet count of 115K on diagnosis.  Bone marrow biopsy and flow cytometry confirmed CLL/SLL.  CT scan shows mildly enlarged left axillary lymph node and periaortic lymph nodes in the retroperitoneum of the abdomen, with enlarged spleen.      Due to worsening lymphocyte count, anemia, thrombocytopenia, as well as fatigue and night sweats, he started ibrutinib on 20.      His WBC is normalized now, with stable hemoglobin and platelet count.    In light of his recent issues with atrial fibrillation and now on Eliquis, his ibrutinib has been on hold since 2021.  Considering his CLL is under good control, his WBC/ is actually on the lower end of normal, and with risk of atrial fibrillation and bleeding on anticoagulants, we will continue to hold ibrutinib for now and monitor his counts.  He will likely need to resume treatment at some point, but we can follow the counts and we could also consider other treatments as well for CLL.      -continue to hold ibrutinib  -he has not had frequent or recent infections  -RTC in 3 months for follow-up and labs: CBC/diff, CMP, LDH    2) Squamous cell " carcinoma of the jaw: s/p Moh's procedure, has some high risk features, including size and perineural involvement. He completed radiation at [facility] with planned 60 Gy x 30 fractions. He has completed radiation and noted that he did not need any more follow-up for this.    3) Abdominal aortic aneurysm: measures up to 7.6 cm on CT scan, incidentally found.  He underwent EVAR on 12/4/17.  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  He is on Plavix now.  On 5/13/19, he underwent and percutaneous aneurysm sac puncture and endo leak embolization by IR on 5/13/19.  -follow-up with vascular surgery and IR    4) Diabetes, hypertension:    -following with PCP    5) Atrial fibrillation with RVR s/p successful DCCV to SR:  -on metoprolol and Eliquis  -following with cardiology    6) CAD: He had an abnormal stress test.  He will follow-up with cardiology, who recommended CT coronoary angiogram as outpatient and follow-up in cardiology clinic    7) Thrombocytopenia: stable.  It had decreased after his [place] hospitalization, may have been from his acute illness and  Bactrim.  Platelet count has improved and around his baseline now.  -monitor CBC    8) Recent hospitalization for pericarditis, pericardial effusion at [facility]:  -he has outpatient follow-up with cardiology      Breana Perry MD  Hematology/Oncology  [facility]      Total time spent on day of visit, including review of tests, obtaining/reviewing separately obtained history, ordering medications/tests/procedures, communicating with PCP/consultants, and documenting in electronic medical record: 30 minutes

## 2021-12-07 NOTE — PATIENT INSTRUCTIONS
Labs scheduled 3/7/22  Appt with Dr. Perry scheduled 3/10/22  Tosha Burris, RN, BSN, GARIMA  RN Care Coordinator  [facility]  [phone]

## 2021-12-16 PROBLEM — M25.562 BILATERAL KNEE PAIN: Status: RESOLVED | Noted: 2021-10-13 | Resolved: 2021-12-16

## 2021-12-16 PROBLEM — M25.551 BILATERAL HIP PAIN: Status: RESOLVED | Noted: 2021-10-13 | Resolved: 2021-12-16

## 2021-12-16 PROBLEM — M25.552 BILATERAL HIP PAIN: Status: RESOLVED | Noted: 2021-10-13 | Resolved: 2021-12-16

## 2021-12-16 PROBLEM — M25.561 BILATERAL KNEE PAIN: Status: RESOLVED | Noted: 2021-10-13 | Resolved: 2021-12-16

## 2022-01-02 ENCOUNTER — HEALTH MAINTENANCE LETTER (OUTPATIENT)
Age: 82
End: 2022-01-02

## 2022-01-07 ENCOUNTER — LAB (OUTPATIENT)
Dept: LAB | Facility: CLINIC | Age: 82
End: 2022-01-07
Payer: COMMERCIAL

## 2022-01-07 ENCOUNTER — PATIENT OUTREACH (OUTPATIENT)
Dept: ONCOLOGY | Facility: CLINIC | Age: 82
End: 2022-01-07

## 2022-01-07 DIAGNOSIS — I31.39 PERICARDIAL EFFUSION: Primary | ICD-10-CM

## 2022-01-07 DIAGNOSIS — I31.39 PERICARDIAL EFFUSION: ICD-10-CM

## 2022-01-07 LAB
CRP SERPL-MCNC: 5.6 MG/L (ref 0–8)
ERYTHROCYTE [SEDIMENTATION RATE] IN BLOOD BY WESTERGREN METHOD: 11 MM/HR (ref 0–20)

## 2022-01-07 PROCEDURE — 86140 C-REACTIVE PROTEIN: CPT

## 2022-01-07 PROCEDURE — 36415 COLL VENOUS BLD VENIPUNCTURE: CPT

## 2022-01-07 PROCEDURE — 85652 RBC SED RATE AUTOMATED: CPT

## 2022-01-07 NOTE — PROGRESS NOTES
Fax received from Dr. Coelho from [facility] Department of Cardiovascular Medicine requesting CRP and ESR to be drawn today while pt is in clinic today.     [place] requesting lab results to be faxed back to them at [phone].     Writer will verify ok with Dr. Perry to place orders for [facility] to draw.     Per Dr. Perry, ok to place orders for these labs. Orders placed. Will fax once resulted.      Tosha Burris, RN, BSN, GARIMA  RN Care Coordinator  [facility]  [phone]

## 2022-01-10 NOTE — PROGRESS NOTES
Writer faxed CRP and ESR results from 1/7/22 to Dr. Sandoval's office at [phone].     Fax transmission confirmed via Right Fax.     Tosha Burris, RN, BSN, GARIMA  RN Care Coordinator  [facility]  [phone]

## 2022-01-14 ENCOUNTER — TRANSFERRED RECORDS (OUTPATIENT)
Dept: HEALTH INFORMATION MANAGEMENT | Facility: CLINIC | Age: 82
End: 2022-01-14
Payer: COMMERCIAL

## 2022-02-07 PROBLEM — M54.2 CERVICAL PAIN: Status: RESOLVED | Noted: 2021-09-01 | Resolved: 2022-02-07

## 2022-02-07 PROBLEM — M54.6 ACUTE MIDLINE THORACIC BACK PAIN: Status: RESOLVED | Noted: 2021-08-23 | Resolved: 2022-02-07

## 2022-02-07 NOTE — PROGRESS NOTES
Discharge Note    Progress reporting period is from initial evaluation date (please see noted date below) to Sep 15, 2021.  Linked Episodes   Type: Episode: Status: Noted: Resolved: Last update: Updated by:   PHYSICAL THERAPY neck/back 9/1/2021 Active 9/1/2021  10/13/2021 10:15 AM Opal Mena, PT      Comments:       Austin failed to follow up and current status is unknown.  Please see information below for last relevant information on current status.  Patient seen for 3 visits.    SUBJECTIVE  Subjective changes noted by patient:  Back and neck pain are feeling much better, hasn't noticed it all week. Did wake up one night with pain, did his exercises and the pain resolved. Has returned to sleeping in bed without pain. Blood sugars initially decreased when he started the insulin but have since increased back to the 300s. Still feeling fatigued and weak, feels it is a result of high blood sugars.  .  Current pain level is 0/10.     Previous pain level was  1/10 (can increase to 7/10).   Changes in function:  Yes (See Goal flowsheet attached for changes in current functional level)  Adverse reaction to treatment or activity: None    OBJECTIVE  Changes noted in objective findings: AROM Cervical Flx: 100%, Ext: 50%, Rot B: 75%,     ASSESSMENT/PLAN  Diagnosis: neck and upper back pain   Updated problem list and treatment plan:   Decreased ROM/flexibility - HEP  Impaired muscle performance - HEP  STG/LTGs have been met or progress has been made towards goals:  Yes, please see goal flowsheet for most current information  Assessment of Progress: current status is unknown.    Last current status: Pt is progressing as expected   Self Management Plans:  HEP  I have re-evaluated this patient and find that the nature, scope, duration and intensity of the therapy is appropriate for the medical condition of the patient.  Austin continues to require the following intervention to meet STG and LTG's:   HEP.    Recommendations:  Discharge with current home program.  Patient to follow up with MD as needed.    Please refer to the daily flowsheet for treatment today, total treatment time and time spent performing 1:1 timed codes.

## 2022-02-08 ENCOUNTER — TRANSFERRED RECORDS (OUTPATIENT)
Dept: HEALTH INFORMATION MANAGEMENT | Facility: CLINIC | Age: 82
End: 2022-02-08
Payer: COMMERCIAL

## 2022-02-08 NOTE — PROGRESS NOTES
Pre-Visit Planning   Next 5 appointments (look out 90 days)    Feb 15, 2022  7:00 AM  (Arrive by 6:45 AM)  Provider Visit with Fabrice Melgoza MD  [facility] ([facility] ) [address]  [address]  [facility] [id number] [phone]   Mar 10, 2022 10:15 AM  Return Visit with Breana Perry MD  [facility] ([facility] ) [address]  [address]  [facility] [id number] [phone]        Appointment Notes for this encounter:   Trouble with diabetes meds, 6 months,     Questionnaires Reviewed/Assigned  No additional questionnaires are needed  Patient preferred phone number: [phone]    Unable to reach. Left voicemail. Advised patient to call clinic back at [phone].

## 2022-02-15 ENCOUNTER — OFFICE VISIT (OUTPATIENT)
Dept: PEDIATRICS | Facility: CLINIC | Age: 82
End: 2022-02-15
Payer: COMMERCIAL

## 2022-02-15 VITALS
TEMPERATURE: 98.4 F | RESPIRATION RATE: 16 BRPM | HEIGHT: 69 IN | WEIGHT: 214 LBS | SYSTOLIC BLOOD PRESSURE: 126 MMHG | BODY MASS INDEX: 31.7 KG/M2 | OXYGEN SATURATION: 98 % | DIASTOLIC BLOOD PRESSURE: 80 MMHG | HEART RATE: 86 BPM

## 2022-02-15 DIAGNOSIS — N18.31 STAGE 3A CHRONIC KIDNEY DISEASE (H): ICD-10-CM

## 2022-02-15 DIAGNOSIS — E78.5 HYPERLIPIDEMIA, UNSPECIFIED HYPERLIPIDEMIA TYPE: ICD-10-CM

## 2022-02-15 DIAGNOSIS — I10 HYPERTENSION GOAL BP (BLOOD PRESSURE) < 140/90: ICD-10-CM

## 2022-02-15 DIAGNOSIS — E11.69 TYPE 2 DIABETES MELLITUS WITH OTHER SPECIFIED COMPLICATION, WITH LONG-TERM CURRENT USE OF INSULIN (H): Primary | ICD-10-CM

## 2022-02-15 DIAGNOSIS — Z79.4 TYPE 2 DIABETES MELLITUS WITH OTHER SPECIFIED COMPLICATION, WITH LONG-TERM CURRENT USE OF INSULIN (H): Primary | ICD-10-CM

## 2022-02-15 PROBLEM — N17.9 ACUTE RENAL FAILURE (H): Status: RESOLVED | Noted: 2021-09-29 | Resolved: 2022-02-15

## 2022-02-15 PROBLEM — E16.2 HYPOGLYCEMIA: Status: RESOLVED | Noted: 2020-08-23 | Resolved: 2022-02-15

## 2022-02-15 PROBLEM — I30.9 ACUTE PERICARDIAL EFFUSION: Status: RESOLVED | Noted: 2021-09-27 | Resolved: 2022-02-15

## 2022-02-15 PROBLEM — R07.9 CHEST PAIN: Status: RESOLVED | Noted: 2021-07-30 | Resolved: 2022-02-15

## 2022-02-15 LAB
ALBUMIN SERPL-MCNC: 3.4 G/DL (ref 3.4–5)
ALP SERPL-CCNC: 69 U/L (ref 40–150)
ALT SERPL W P-5'-P-CCNC: 35 U/L (ref 0–70)
ANION GAP SERPL CALCULATED.3IONS-SCNC: 7 MMOL/L (ref 3–14)
AST SERPL W P-5'-P-CCNC: 20 U/L (ref 0–45)
BILIRUB SERPL-MCNC: 0.4 MG/DL (ref 0.2–1.3)
BUN SERPL-MCNC: 24 MG/DL (ref 7–30)
CALCIUM SERPL-MCNC: 8.9 MG/DL (ref 8.5–10.1)
CHLORIDE BLD-SCNC: 108 MMOL/L (ref 94–109)
CHOLEST SERPL-MCNC: 120 MG/DL
CO2 SERPL-SCNC: 25 MMOL/L (ref 20–32)
CREAT SERPL-MCNC: 1.39 MG/DL (ref 0.66–1.25)
CREAT UR-MCNC: 126 MG/DL
FASTING STATUS PATIENT QL REPORTED: YES
GFR SERPL CREATININE-BSD FRML MDRD: 52 ML/MIN/1.73M2
GLUCOSE BLD-MCNC: 152 MG/DL (ref 70–99)
HBA1C MFR BLD: 6.2 % (ref 0–5.6)
HDLC SERPL-MCNC: 33 MG/DL
LDLC SERPL CALC-MCNC: 58 MG/DL
MICROALBUMIN UR-MCNC: 81 MG/L
MICROALBUMIN/CREAT UR: 64.29 MG/G CR (ref 0–17)
NONHDLC SERPL-MCNC: 87 MG/DL
POTASSIUM BLD-SCNC: 4.5 MMOL/L (ref 3.4–5.3)
PROT SERPL-MCNC: 6.8 G/DL (ref 6.8–8.8)
SODIUM SERPL-SCNC: 140 MMOL/L (ref 133–144)
TRIGL SERPL-MCNC: 147 MG/DL

## 2022-02-15 PROCEDURE — 99214 OFFICE O/P EST MOD 30 MIN: CPT | Performed by: INTERNAL MEDICINE

## 2022-02-15 PROCEDURE — 82043 UR ALBUMIN QUANTITATIVE: CPT | Performed by: INTERNAL MEDICINE

## 2022-02-15 PROCEDURE — 80061 LIPID PANEL: CPT | Performed by: INTERNAL MEDICINE

## 2022-02-15 PROCEDURE — 36415 COLL VENOUS BLD VENIPUNCTURE: CPT | Performed by: INTERNAL MEDICINE

## 2022-02-15 PROCEDURE — 80053 COMPREHEN METABOLIC PANEL: CPT | Performed by: INTERNAL MEDICINE

## 2022-02-15 PROCEDURE — 83036 HEMOGLOBIN GLYCOSYLATED A1C: CPT | Performed by: INTERNAL MEDICINE

## 2022-02-15 RX ORDER — METOPROLOL SUCCINATE 50 MG/1
50 TABLET, EXTENDED RELEASE ORAL DAILY
Qty: 90 TABLET | Refills: 3 | Status: SHIPPED | OUTPATIENT
Start: 2022-02-15 | End: 2022-04-12 | Stop reason: DRUGHIGH

## 2022-02-15 RX ORDER — INSULIN LISPRO 100 [IU]/ML
INJECTION, SOLUTION INTRAVENOUS; SUBCUTANEOUS
Qty: 60 ML | Refills: 0 | Status: CANCELLED | OUTPATIENT
Start: 2022-02-15

## 2022-02-15 ASSESSMENT — MIFFLIN-ST. JEOR: SCORE: 1676.08

## 2022-02-15 NOTE — PROGRESS NOTES
Assessment & Plan     (E11.9) Type 2 diabetes mellitus without complication, without long-term current use of insulin (H)  (primary encounter diagnosis)  Comment:    Adequate control, insulin regimen reviewed, continue.   rtc 3 months  Plan: blood glucose (CONTOUR NEXT TEST) test strip,         insulin glargine (LANTUS PEN) 100 UNIT/ML pen,         Comprehensive metabolic panel (BMP + Alb, Alk         Phos, ALT, AST, Total. Bili, TP), Hemoglobin         A1c, Albumin Random Urine Quantitative with         Creat Ratio          (I10) Hypertension goal BP (blood pressure) < 140/90  Comment:    Well controlled  Plan: metoprolol succinate ER (TOPROL-XL) 50 MG 24 hr        tablet          (E78.5) Hyperlipidemia, unspecified hyperlipidemia type  Comment: well controlled, continue crestor  Plan: Lipid panel reflex to direct LDL Fasting          (N18.31) Stage 3a chronic kidney disease (H)  Comment:   Plan:   stable  Lab Results   Component Value Date    CR 1.39 02/15/2022    CR 1.50 11/08/2021       Return in about 3 months (around 5/15/2022) for Diabetes follow-up.    Fabrice Melgoza MD  [facility] CYRUS Leblanc is a 81 year old who presents for the following health issues     History of Present Illness       Hypertension: He presents for follow up of hypertension.  He does check blood pressure  regularly outside of the clinic. Outpatient blood pressures have not been over 140/90. He follows a low salt diet.     He eats 2-3 servings of fruits and vegetables daily.He consumes 0 sweetened beverage(s) daily.He exercises with enough effort to increase his heart rate 20 to 29 minutes per day.  He exercises with enough effort to increase his heart rate 3 or less days per week.   He is taking medications regularly.       Diabetes Follow-up    How often are you checking your blood sugar? One time daily  What time of day are you checking your blood sugars (select all that apply)?  Before meals  Have you  had any blood sugars above 200?  No  Have you had any blood sugars below 70?  No    What symptoms do you notice when your blood sugar is low?  None    What concerns do you have today about your diabetes? None     Do you have any of these symptoms? (Select all that apply)  No numbness or tingling in feet.  No redness, sores or blisters on feet.  No complaints of excessive thirst.  No reports of blurry vision.  No significant changes to weight.    Have you had a diabetic eye exam in the last 12 months? No        BP Readings from Last 2 Encounters:   02/15/22 126/80   12/07/21 (!) 155/88     Hemoglobin A1C POCT (%)   Date Value   04/05/2021 5.9 (H)   01/15/2021 6.7 (H)     Hemoglobin A1C (%)   Date Value   02/15/2022 6.2 (H)   07/30/2021 7.0 (H)     LDL Cholesterol Calculated (mg/dL)   Date Value   02/15/2022 58   04/05/2021 96   01/15/2021 27       How many servings of fruits and vegetables do you eat daily?  2-3    On average, how many sweetened beverages do you drink each day (Examples: soda, juice, sweet tea, etc.  Do NOT count diet or artificially sweetened beverages)?   0    How many days per week do you exercise enough to make your heart beat faster? 3 or less    How many minutes a day do you exercise enough to make your heart beat faster? 9 or less    How many days per week do you miss taking your medication? 0    Patient Active Problem List   Diagnosis     Hearing loss     Hypertension goal BP (blood pressure) < 140/90     Diabetes mellitus, type 2 (H)     Hyperlipidemia     Inflammatory Monoarthritis, Left Hip     Esophageal reflux     Overweight - BMI >35     BCC (basal cell carcinoma), face     Skin lesion of back     Chronic lymphocytic leukemia (CLL), B-cell (H)     Abdominal aortic aneurysm (AAA) (H)     Anemia     Elevated prostate specific antigen (PSA)     Stage 3a chronic kidney disease (H)     Bilateral incipient cataracts     Artery dissection (H)     Paroxysmal atrial fibrillation (H)      "Sensorineural hearing loss, bilateral     Ureterolithiasis     Type 2 diabetes mellitus with other specified complication, with long-term current use of insulin (H)     Current Outpatient Medications   Medication Sig Dispense Refill     blood glucose (CONTOUR NEXT TEST) test strip Use to test blood sugar 3-4 times daily or as directed. 100 strip 6     blood glucose monitoring (NO BRAND SPECIFIED) meter device kit Use to test blood sugar 2 times daily or as directed. 1 kit 0     clopidogrel (PLAVIX) 75 MG tablet Take 1 tablet (75 mg) by mouth daily 90 tablet 3     dutasteride (AVODART) 0.5 MG capsule Take 0.5 mg by mouth       ferrous fumarate 65 mg, [place]. FE,-Vitamin C 125 mg (VITRON-C)  MG TABS tablet Take 65 mg of iron by mouth       insulin glargine (LANTUS PEN) 100 UNIT/ML pen Inject 28 Units Subcutaneous daily 30 mL 1     insulin pen needle (BD SHRUTHI U/F) 32G X 4 MM miscellaneous Use 4 daily as directed. 300 each 5     metoprolol succinate ER (TOPROL-XL) 50 MG 24 hr tablet Take 1 tablet (50 mg) by mouth daily 90 tablet 3     rosuvastatin (CRESTOR) 20 MG tablet Take 1 tablet (20 mg) by mouth daily 90 tablet 3     bacitracin 500 UNIT/GM OINT Apply 1 Application topically as needed              Objective    /80 (Cuff Size: Adult Large)   Pulse 86   Temp 98.4  F (36.9  C) (Tympanic)   Resp 16   Ht 1.753 m (5' 9\")   Wt 97.1 kg (214 lb)   SpO2 98%   BMI 31.60 kg/m    Body mass index is 31.6 kg/m .  Physical Exam   GENERAL: alert and no distress  NECK: no adenopathy, no asymmetry, masses, or scars and thyroid normal to palpation  RESP: lungs clear to auscultation - no rales, rhonchi or wheezes  CV: regular rate and rhythm, normal S1 S2, no S3 or S4, no murmur  SKIN: no suspicious lesions or rashes  NEURO: Normal strength and tone, mentation intact and speech normal  PSYCH: mentation appears normal, affect normal/bright  Diabetic foot exam: no trophic changes or ulcerative lesions    "

## 2022-02-17 ENCOUNTER — TRANSFERRED RECORDS (OUTPATIENT)
Dept: HEALTH INFORMATION MANAGEMENT | Facility: CLINIC | Age: 82
End: 2022-02-17
Payer: COMMERCIAL

## 2022-02-21 ENCOUNTER — TELEPHONE (OUTPATIENT)
Dept: PEDIATRICS | Facility: CLINIC | Age: 82
End: 2022-02-21
Payer: COMMERCIAL

## 2022-02-21 NOTE — TELEPHONE ENCOUNTER
True from [facility] is requesting verbal orders called into confidential line: [phone]. Pt is having and ESD-Lower on 3/18/22 and Margie is requesting a 7 day hold on Plavix and any bridging orders.     Ita Oneal on 2/21/2022 at 2:34 PM

## 2022-02-22 NOTE — TELEPHONE ENCOUNTER
Called Margie at [phone] & left detailed message about the plavix hold. Advised them to call me back with any questions.    Mayte, RN  Patient Advocate Liason (PAL)  [facility]

## 2022-02-22 NOTE — TELEPHONE ENCOUNTER
Please review the notes from TC(Ita) below & advise on holding plavix prior to procedure. Thanks.       Notes from colonoscopy result on 2/8/22:    Mayte RN  Patient Advocate Liason (PAL)  [facility]

## 2022-03-07 ENCOUNTER — LAB (OUTPATIENT)
Dept: ONCOLOGY | Facility: CLINIC | Age: 82
End: 2022-03-07
Attending: INTERNAL MEDICINE
Payer: COMMERCIAL

## 2022-03-07 DIAGNOSIS — C91.10 CLL (CHRONIC LYMPHOCYTIC LEUKEMIA) (H): ICD-10-CM

## 2022-03-07 LAB
ALBUMIN SERPL-MCNC: 3.5 G/DL (ref 3.4–5)
ALP SERPL-CCNC: 67 U/L (ref 40–150)
ALT SERPL W P-5'-P-CCNC: 43 U/L (ref 0–70)
ANION GAP SERPL CALCULATED.3IONS-SCNC: 4 MMOL/L (ref 3–14)
AST SERPL W P-5'-P-CCNC: 24 U/L (ref 0–45)
BASOPHILS # BLD AUTO: 0 10E3/UL (ref 0–0.2)
BASOPHILS NFR BLD AUTO: 0 %
BILIRUB SERPL-MCNC: 0.5 MG/DL (ref 0.2–1.3)
BUN SERPL-MCNC: 28 MG/DL (ref 7–30)
CALCIUM SERPL-MCNC: 9 MG/DL (ref 8.5–10.1)
CHLORIDE BLD-SCNC: 107 MMOL/L (ref 94–109)
CO2 SERPL-SCNC: 29 MMOL/L (ref 20–32)
CREAT SERPL-MCNC: 1.45 MG/DL (ref 0.66–1.25)
EOSINOPHIL # BLD AUTO: 0.2 10E3/UL (ref 0–0.7)
EOSINOPHIL NFR BLD AUTO: 3 %
ERYTHROCYTE [DISTWIDTH] IN BLOOD BY AUTOMATED COUNT: 13.8 % (ref 10–15)
GFR SERPL CREATININE-BSD FRML MDRD: 49 ML/MIN/1.73M2
GLUCOSE BLD-MCNC: 191 MG/DL (ref 70–99)
HCT VFR BLD AUTO: 38.5 % (ref 40–53)
HGB BLD-MCNC: 12.7 G/DL (ref 13.3–17.7)
IMM GRANULOCYTES # BLD: 0 10E3/UL
IMM GRANULOCYTES NFR BLD: 0 %
LDH SERPL L TO P-CCNC: 184 U/L (ref 85–227)
LYMPHOCYTES # BLD AUTO: 2.3 10E3/UL (ref 0.8–5.3)
LYMPHOCYTES NFR BLD AUTO: 36 %
MCH RBC QN AUTO: 31 PG (ref 26.5–33)
MCHC RBC AUTO-ENTMCNC: 33 G/DL (ref 31.5–36.5)
MCV RBC AUTO: 94 FL (ref 78–100)
MONOCYTES # BLD AUTO: 0.5 10E3/UL (ref 0–1.3)
MONOCYTES NFR BLD AUTO: 9 %
NEUTROPHILS # BLD AUTO: 3.3 10E3/UL (ref 1.6–8.3)
NEUTROPHILS NFR BLD AUTO: 52 %
NRBC # BLD AUTO: 0 10E3/UL
NRBC BLD AUTO-RTO: 0 /100
PLATELET # BLD AUTO: 66 10E3/UL (ref 150–450)
POTASSIUM BLD-SCNC: 4.4 MMOL/L (ref 3.4–5.3)
PROT SERPL-MCNC: 6.8 G/DL (ref 6.8–8.8)
RBC # BLD AUTO: 4.1 10E6/UL (ref 4.4–5.9)
SODIUM SERPL-SCNC: 140 MMOL/L (ref 133–144)
WBC # BLD AUTO: 6.3 10E3/UL (ref 4–11)

## 2022-03-07 PROCEDURE — 83615 LACTATE (LD) (LDH) ENZYME: CPT | Performed by: INTERNAL MEDICINE

## 2022-03-07 PROCEDURE — 36415 COLL VENOUS BLD VENIPUNCTURE: CPT

## 2022-03-07 PROCEDURE — 80053 COMPREHEN METABOLIC PANEL: CPT | Performed by: INTERNAL MEDICINE

## 2022-03-07 PROCEDURE — 85025 COMPLETE CBC W/AUTO DIFF WBC: CPT | Performed by: INTERNAL MEDICINE

## 2022-03-10 ENCOUNTER — ONCOLOGY VISIT (OUTPATIENT)
Dept: ONCOLOGY | Facility: CLINIC | Age: 82
End: 2022-03-10
Attending: INTERNAL MEDICINE
Payer: COMMERCIAL

## 2022-03-10 VITALS
BODY MASS INDEX: 31.77 KG/M2 | OXYGEN SATURATION: 98 % | WEIGHT: 214.5 LBS | DIASTOLIC BLOOD PRESSURE: 91 MMHG | HEIGHT: 69 IN | TEMPERATURE: 98.7 F | HEART RATE: 86 BPM | SYSTOLIC BLOOD PRESSURE: 162 MMHG | RESPIRATION RATE: 18 BRPM

## 2022-03-10 DIAGNOSIS — C91.10 CLL (CHRONIC LYMPHOCYTIC LEUKEMIA) (H): Primary | ICD-10-CM

## 2022-03-10 PROCEDURE — G0463 HOSPITAL OUTPT CLINIC VISIT: HCPCS

## 2022-03-10 PROCEDURE — 99213 OFFICE O/P EST LOW 20 MIN: CPT | Performed by: INTERNAL MEDICINE

## 2022-03-10 ASSESSMENT — PAIN SCALES - GENERAL: PAINLEVEL: NO PAIN (0)

## 2022-03-10 NOTE — PROGRESS NOTES
[facility]    Hematology/Oncology Established Patient Note      Today's Date: 3/10/22    Reason for Follow-up: CLL      HISTORY OF PRESENT ILLNESS: Austin Garza is a 81 year old male with PMHx of DMII, HTN who presented with leukocytosis.  In November 2017, he was found to have elevated WBC of 61.7K, hemoglobin of 10.4, and platelet of 115K.  There were no other CBC results available between 2010 and 2017.  Prior to 2010, he had normal CBC, with normal to mild anemia, and mild thrombocytopenia.   He was asymptomatic.    He underwent bone marrow biopsy on 11/21/17, which was consistent with chronic lymphocytic leukemia/small lymphocytic lymphoma, with 13% ringed sideroblasts identified.  The presence of increased numbers of ringed sideroblasts raises the possibility of an associated myelodysplastic syndrome.  Dysplastic changes are not identified though.  Many cases exhibiting ringed sideroblasts are metabolic origin, without significant risk of progression to acute leukemia, and these typically do not show dysplastic morphology as is the case with this specimen.  15% ringed sideroblasts are required for a diagnosis for RARS, which this specimen does not meet.  Flow cytometry is also consistent with CLL/SLL.  Cytogenetics show two (10%) of the metaphase cells comprise a clone characterized by a deletion within the proximal long arm of a chromosome 13 as the sole karyotypic abnormality.  Three (15%) metaphases had loss of the Y chromosome as the sole abnormality.    CT scan on 11/29/17 shows mildly enlarged left axillary lymph node and periaortic lymph nodes in the retroperitoneum of the abdomen.  Spleen is also enlarged.    On his staging CT scan for CLL, he was incidentally found to have a large infrarenal abdominal aortic aneurysm, measuring 7.6 cm.  He was seen by Dr. Thomas, and he underwent EVAR on 12/4/17.     He started ibrutinib on 5/4/20.  It was held 5/28/20-6/4/20 for tooth  extraction.    He was hospitalized on 7/30/2021 for shortness of breath, new CHF, A. fib with RVR and ibrutinib was put on hold.  He was seen by cardiology and eventually underwent cardioversion for the A. fib.  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  He was started on Eliquis and metoprolol.  His Plavix was stopped.    He went to the [facility] ED on 9/26/21 and found to have pericardial effusion.  Due to lack of beds, he was transferred to [facility] for acute pericardial effusion and pericarditis.  He was started on colchicine and prednisone taper x 6 weeks.  He was also given Bactrim for PJP prophylaxis due duration of prednisone course.  He was discharged on 10/1/21.        INTERIM HISTORY: Benji says that he feels well.      REVIEW OF SYSTEMS:   14 point ROS was reviewed and is negative other than as noted above in HPI.       HOME MEDICATIONS:  Current Outpatient Medications   Medication Sig Dispense Refill     bacitracin 500 UNIT/GM OINT Apply 1 Application topically as needed       blood glucose (CONTOUR NEXT TEST) test strip Use to test blood sugar 3-4 times daily or as directed. 100 strip 6     blood glucose monitoring (NO BRAND SPECIFIED) meter device kit Use to test blood sugar 2 times daily or as directed. 1 kit 0     clopidogrel (PLAVIX) 75 MG tablet Take 1 tablet (75 mg) by mouth daily 90 tablet 3     dutasteride (AVODART) 0.5 MG capsule Take 0.5 mg by mouth        insulin glargine (LANTUS PEN) 100 UNIT/ML pen Inject 28 Units Subcutaneous daily 30 mL 1     insulin pen needle (BD SHRUTHI U/F) 32G X 4 MM miscellaneous Use 4 daily as directed. 300 each 5     metoprolol succinate ER (TOPROL-XL) 50 MG 24 hr tablet Take 1 tablet (50 mg) by mouth daily 90 tablet 3     rosuvastatin (CRESTOR) 20 MG tablet Take 1 tablet (20 mg) by mouth daily 90 tablet 3         ALLERGIES:  Allergies   Allergen Reactions     Ace Inhibitors Cough     cough  cough         PAST MEDICAL HISTORY:  Past  Medical History:   Diagnosis Date     AAA (abdominal aortic aneurysm)      BCC (basal cell carcinoma of skin) - face      CLL (chronic lymphocytic leukemia)      Diaphragmatic hernia      Diverticulitis of colon      Esophageal reflux      Essential hypertension      Hyperlipidemia      Irritable bowel syndrome      Macular degeneration (senile) of retina      Mumps      Squamous Cell Carcinoma, Back      Type 2 diabetes mellitus          PAST SURGICAL HISTORY:  Past Surgical History:   Procedure Laterality Date     ANESTHESIA CARDIOVERSION N/A 2021    Procedure: ANESTHESIA, FOR CARDIOVERSION;  Surgeon: GENERIC ANESTHESIA PROVIDER;  Location: [facility] OR     APPENDECTOMY OPEN       BONE MARROW BIOPSY, BONE SPECIMEN, NEEDLE/TROCAR N/A 2017    Procedure: BIOPSY BONE MARROW;  BONE MARROW BIOPSY;  Surgeon: Shady Garcia MD;  Location:  GI     COLONOSCOPY       ENDOVASCULAR REPAIR ANEURYSM ABDOMINAL AORTA N/A 2017    Procedure: ENDOVASCULAR REPAIR ANEURYSM ABDOMINAL AORTA;  ENDOVASCULAR REPAIR ANEURYSM ABDOMINAL AORTA;  Surgeon: Milton Cazares MD;  Location:  OR     Fistulotomy with marsupialization for repair of fisture in ano       IR ABDOMINAL AORTOGRAM  3/11/2019     IR VISCERAL EMBOLIZATION  2019     Multiple skin tags - resection  1998     Squamous cell skin cancer resection - back       VASECTOMY           SOCIAL HISTORY:  Social History     Socioeconomic History     Marital status:      Spouse name: librado     Number of children: 0     Years of education: 17     Highest education level: Not on file   Occupational History     Occupation: retired   Tobacco Use     Smoking status: Former Smoker     Packs/day: 0.50     Years: 20.00     Pack years: 10.00     Quit date: 1975     Years since quittin.2     Smokeless tobacco: Former User   Substance and Sexual Activity     Alcohol use: Yes     Alcohol/week: 10.0 - 14.0 standard drinks     Types: 10 -  "14 Standard drinks or equivalent per week     Comment: 3 drinks a day/wine     Drug use: No     Sexual activity: Never   Other Topics Concern     Parent/sibling w/ CABG, MI or angioplasty before 65F 55M? Not Asked   Social History Narrative     Not on file     Social Determinants of Health     Financial Resource Strain: Not on file   Food Insecurity: Not on file   Transportation Needs: Not on file   Physical Activity: Not on file   Stress: Not on file   Social Connections: Not on file   Intimate Partner Violence: Not At Risk     Fear of Current or Ex-Partner: No     Emotionally Abused: No     Physically Abused: No     Sexually Abused: No   Housing Stability: Not on file   He quit smoking in .  He drinks 1-3 glasses of wine a night with dinner.  He is retired, and used to work as a .  He lives with his wife in [place].  His cousin had lung cancer and  around age 69.  Otherwise, he denies family history of cancer.        FAMILY HISTORY:  Family History   Problem Relation Age of Onset     Cerebrovascular Disease Father         x 2     Hypertension Mother      C.A.D. Mother      Cancer Mother         skin     Eye Disorder Mother         glaucoma     Prostate Cancer No family hx of      Cancer - colorectal No family hx of      Glaucoma No family hx of      Macular Degeneration No family hx of          PHYSICAL EXAM:  BP (!) 162/91   Pulse 86   Temp 98.7  F (37.1  C) (Tympanic)   Resp 18   Ht 1.753 m (5' 9\")   Wt 97.3 kg (214 lb 8 oz)   SpO2 98%   BMI 31.68 kg/m    ECO  GENERAL/CONSTITUTIONAL: No acute distress.  EYES: No scleral icterus.  NEUROLOGIC: Alert, oriented, answers questions appropriately.  INTEGUMENTARY: No jaundice.    GAIT: Steady, does not use assistive device      LABS:  CBC RESULTS: Recent Labs   Lab Test 22  0959   WBC 6.3   RBC 4.10*   HGB 12.7*   HCT 38.5*   MCV 94   MCH 31.0   MCHC 33.0   RDW 13.8   PLT 66*     Recent Labs   Lab Test 2259 02/15/22  0734    " 140   POTASSIUM 4.4 4.5   CHLORIDE 107 108   CO2 29 25   ANIONGAP 4 7   * 152*   BUN 28 24   CR 1.45* 1.39*   MAGDALENO 9.0 8.9     Lab Results   Component Value Date    AST 24 03/07/2022    AST 15 06/01/2021     Lab Results   Component Value Date    ALT 43 03/07/2022    ALT 21 06/01/2021     Lab Results   Component Value Date    BILICONJ 0.0 04/29/2005      Lab Results   Component Value Date    BILITOTAL 0.5 03/07/2022    BILITOTAL 0.7 06/01/2021     Lab Results   Component Value Date    ALBUMIN 3.5 03/07/2022    ALBUMIN 3.6 06/01/2021     Lab Results   Component Value Date    PROTTOTAL 6.8 03/07/2022    PROTTOTAL 7.0 06/01/2021      Lab Results   Component Value Date    ALKPHOS 67 03/07/2022    ALKPHOS 58 06/01/2021           ASSESSMENT/PLAN:  Austin Garza is a 81 year old male with:    1) CLL/SLL: BLACKWOOD stage III, with lymphocytosis, lymphadenopathy, splenomegaly, and anemia.  He has mild thrombocytopenia as well, but is above 100K.  He presented with leukocytosis with WBC of 61.7K, hemoglobin of 10.4, and platelet count of 115K on diagnosis.  Bone marrow biopsy and flow cytometry confirmed CLL/SLL.  CT scan shows mildly enlarged left axillary lymph node and periaortic lymph nodes in the retroperitoneum of the abdomen, with enlarged spleen.      Due to worsening lymphocyte count, anemia, thrombocytopenia, as well as fatigue and night sweats, he started ibrutinib on 5/4/20.      His WBC normalized, with stable hemoglobin and platelet count.    In light of his recent issues with atrial fibrillation and now on Eliquis, his ibrutinib has been on hold since August 2021.  Considering his CLL is under good control, his WBC is actually on the lower end of normal, and with risk of atrial fibrillation and bleeding on anticoagulants, we will continue to hold ibrutinib for now and monitor his counts.  He will likely need to resume treatment at some point, but we can follow the counts and we could also consider  other treatments as well for CLL.      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 He completed radiation at [facility] with planned 60 Gy x 30 fractions. He has completed radiation and noted that he did not need any more follow-up for this.    3) Abdominal aortic aneurysm: measures up to 7.6 cm on CT scan, incidentally found.  He underwent EVAR on 12/4/17.  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  He is on Plavix now.  On 5/13/19, he underwent and percutaneous aneurysm sac puncture and endo leak embolization by IR on 5/13/19.  -follow-up with vascular surgery and IR    4) Diabetes, hypertension:    -following with PCP    5) Atrial fibrillation with RVR s/p successful DCCV to SR:  -on metoprolol and Eliquis  -following with cardiology    6) CAD:   -follow-up with cardiology    7) Thrombocytopenia: stable.  It had decreased after his [facility] hospitalization, may have been from his acute illness and Bactrim.  Platelet count is around his baseline now.  -monitor CBC    8) Recent hospitalization for pericarditis, pericardial effusion at [facility]:  -he has outpatient follow-up with cardiology      Breana Perry MD  Hematology/Oncology  [facility]      Total time spent on day of visit, including review of tests, obtaining/reviewing separately obtained history, ordering medications/tests/procedures, communicating with PCP/consultants, and documenting in electronic medical record: 20 minutes

## 2022-03-10 NOTE — LETTER
3/10/2022         RE: Austin Garza  [address] [place] [address]        Dear Colleague,    Thank you for referring your patient, Autsin Garza, to the [facility]. Please see a copy of my visit note below.    [facility]    Hematology/Oncology Established Patient Note      Today's Date: 3/10/22    Reason for Follow-up: CLL      HISTORY OF PRESENT ILLNESS: Austin Garza is a 81 year old male with PMHx of DMII, HTN who presented with leukocytosis.  In November 2017, he was found to have elevated WBC of 61.7K, hemoglobin of 10.4, and platelet of 115K.  There were no other CBC results available between 2010 and 2017.  Prior to 2010, he had normal CBC, with normal to mild anemia, and mild thrombocytopenia.   He was asymptomatic.    He underwent bone marrow biopsy on 11/21/17, which was consistent with chronic lymphocytic leukemia/small lymphocytic lymphoma, with 13% ringed sideroblasts identified.  The presence of increased numbers of ringed sideroblasts raises the possibility of an associated myelodysplastic syndrome.  Dysplastic changes are not identified though.  Many cases exhibiting ringed sideroblasts are metabolic origin, without significant risk of progression to acute leukemia, and these typically do not show dysplastic morphology as is the case with this specimen.  15% ringed sideroblasts are required for a diagnosis for RARS, which this specimen does not meet.  Flow cytometry is also consistent with CLL/SLL.  Cytogenetics show two (10%) of the metaphase cells comprise a clone characterized by a deletion within the proximal long arm of a chromosome 13 as the sole karyotypic abnormality.  Three (15%) metaphases had loss of the Y chromosome as the sole abnormality.    CT scan on 11/29/17 shows mildly enlarged left axillary lymph node and periaortic lymph nodes in the retroperitoneum of the abdomen.  Spleen is also enlarged.    On his  staging CT scan for CLL, he was incidentally found to have a large infrarenal abdominal aortic aneurysm, measuring 7.6 cm.  He was seen by Dr. Thomas, and he underwent EVAR on 12/4/17.     He started ibrutinib on 5/4/20.  It was held 5/28/20-6/4/20 for tooth extraction.    He was hospitalized on 7/30/2021 for shortness of breath, new CHF, A. fib with RVR and ibrutinib was put on hold.  He was seen by cardiology and eventually underwent cardioversion for the A. fib.  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  He was started on Eliquis and metoprolol.  His Plavix was stopped.    He went to the [facility] ED on 9/26/21 and found to have pericardial effusion.  Due to lack of beds, he was transferred to [facility] for acute pericardial effusion and pericarditis.  He was started on colchicine and prednisone taper x 6 weeks.  He was also given Bactrim for PJP prophylaxis due duration of prednisone course.  He was discharged on 10/1/21.        INTERIM HISTORY: Benji says that he feels well.      REVIEW OF SYSTEMS:   14 point ROS was reviewed and is negative other than as noted above in HPI.       HOME MEDICATIONS:  Current Outpatient Medications   Medication Sig Dispense Refill     bacitracin 500 UNIT/GM OINT Apply 1 Application topically as needed       blood glucose (CONTOUR NEXT TEST) test strip Use to test blood sugar 3-4 times daily or as directed. 100 strip 6     blood glucose monitoring (NO BRAND SPECIFIED) meter device kit Use to test blood sugar 2 times daily or as directed. 1 kit 0     clopidogrel (PLAVIX) 75 MG tablet Take 1 tablet (75 mg) by mouth daily 90 tablet 3     dutasteride (AVODART) 0.5 MG capsule Take 0.5 mg by mouth        insulin glargine (LANTUS PEN) 100 UNIT/ML pen Inject 28 Units Subcutaneous daily 30 mL 1     insulin pen needle (BD SHRUTHI U/F) 32G X 4 MM miscellaneous Use 4 daily as directed. 300 each 5     metoprolol succinate ER (TOPROL-XL) 50 MG 24 hr tablet Take 1 tablet  (50 mg) by mouth daily 90 tablet 3     rosuvastatin (CRESTOR) 20 MG tablet Take 1 tablet (20 mg) by mouth daily 90 tablet 3         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      CLL (chronic lymphocytic leukemia)      Diaphragmatic hernia      Diverticulitis of colon      Esophageal reflux      Essential hypertension      Hyperlipidemia      Irritable bowel syndrome      Macular degeneration (senile) of retina      Mumps      Squamous Cell Carcinoma, Back 1988     Type 2 diabetes mellitus          PAST SURGICAL HISTORY:  Past Surgical History:   Procedure Laterality Date     ANESTHESIA CARDIOVERSION N/A 8/2/2021    Procedure: ANESTHESIA, FOR CARDIOVERSION;  Surgeon: GENERIC ANESTHESIA PROVIDER;  Location: [facility] OR     APPENDECTOMY OPEN  1966     BONE MARROW BIOPSY, BONE SPECIMEN, NEEDLE/TROCAR N/A 11/21/2017    Procedure: BIOPSY BONE MARROW;  BONE MARROW BIOPSY;  Surgeon: Shady Garcia MD;  Location:  GI     COLONOSCOPY  2002     ENDOVASCULAR REPAIR ANEURYSM ABDOMINAL AORTA N/A 12/4/2017    Procedure: ENDOVASCULAR REPAIR ANEURYSM ABDOMINAL AORTA;  ENDOVASCULAR REPAIR ANEURYSM ABDOMINAL AORTA;  Surgeon: Milton Cazares MD;  Location:  OR     Fistulotomy with marsupialization for repair of fisture in ano  2007     IR ABDOMINAL AORTOGRAM  3/11/2019     IR VISCERAL EMBOLIZATION  5/13/2019     Multiple skin tags - resection  1998     Squamous cell skin cancer resection - back  1985-90     VASECTOMY           SOCIAL HISTORY:  Social History     Socioeconomic History     Marital status:      Spouse name: librado     Number of children: 0     Years of education: 17     Highest education level: Not on file   Occupational History     Occupation: retired   Tobacco Use     Smoking status: Former Smoker      "Packs/day: 0.50     Years: 20.00     Pack years: 10.00     Quit date: 1975     Years since quittin.2     Smokeless tobacco: Former User   Substance and Sexual Activity     Alcohol use: Yes     Alcohol/week: 10.0 - 14.0 standard drinks     Types: 10 - 14 Standard drinks or equivalent per week     Comment: 3 drinks a day/wine     Drug use: No     Sexual activity: Never   Other Topics Concern     Parent/sibling w/ CABG, MI or angioplasty before 65F 55M? Not Asked   Social History Narrative     Not on file     Social Determinants of Health     Financial Resource Strain: Not on file   Food Insecurity: Not on file   Transportation Needs: Not on file   Physical Activity: Not on file   Stress: Not on file   Social Connections: Not on file   Intimate Partner Violence: Not At Risk     Fear of Current or Ex-Partner: No     Emotionally Abused: No     Physically Abused: No     Sexually Abused: No   Housing Stability: Not on file   He quit smoking in .  He drinks 1-3 glasses of wine a night with dinner.  He is retired, and used to work as a .  He lives with his wife in [place].  His cousin had lung cancer and  around age 69.  Otherwise, he denies family history of cancer.        FAMILY HISTORY:  Family History   Problem Relation Age of Onset     Cerebrovascular Disease Father         x 2     Hypertension Mother      C.A.D. Mother      Cancer Mother         skin     Eye Disorder Mother         glaucoma     Prostate Cancer No family hx of      Cancer - colorectal No family hx of      Glaucoma No family hx of      Macular Degeneration No family hx of          PHYSICAL EXAM:  BP (!) 162/91   Pulse 86   Temp 98.7  F (37.1  C) (Tympanic)   Resp 18   Ht 1.753 m (5' 9\")   Wt 97.3 kg (214 lb 8 oz)   SpO2 98%   BMI 31.68 kg/m    ECO  GENERAL/CONSTITUTIONAL: No acute distress.  EYES: No scleral icterus.  NEUROLOGIC: Alert, oriented, answers questions appropriately.  INTEGUMENTARY: No jaundice.    GAIT: " Steady, does not use assistive device      LABS:  CBC RESULTS: Recent Labs   Lab Test 03/07/22  0959   WBC 6.3   RBC 4.10*   HGB 12.7*   HCT 38.5*   MCV 94   MCH 31.0   MCHC 33.0   RDW 13.8   PLT 66*     Recent Labs   Lab Test 03/07/22  0959 02/15/22  0734    140   POTASSIUM 4.4 4.5   CHLORIDE 107 108   CO2 29 25   ANIONGAP 4 7   * 152*   BUN 28 24   CR 1.45* 1.39*   MAGDALENO 9.0 8.9     Lab Results   Component Value Date    AST 24 03/07/2022    AST 15 06/01/2021     Lab Results   Component Value Date    ALT 43 03/07/2022    ALT 21 06/01/2021     Lab Results   Component Value Date    BILICONJ 0.0 04/29/2005      Lab Results   Component Value Date    BILITOTAL 0.5 03/07/2022    BILITOTAL 0.7 06/01/2021     Lab Results   Component Value Date    ALBUMIN 3.5 03/07/2022    ALBUMIN 3.6 06/01/2021     Lab Results   Component Value Date    PROTTOTAL 6.8 03/07/2022    PROTTOTAL 7.0 06/01/2021      Lab Results   Component Value Date    ALKPHOS 67 03/07/2022    ALKPHOS 58 06/01/2021           ASSESSMENT/PLAN:  Austin Garza is a 81 year old male with:    1) CLL/SLL: BLACKWOOD stage III, with lymphocytosis, lymphadenopathy, splenomegaly, and anemia.  He has mild thrombocytopenia as well, but is above 100K.  He presented with leukocytosis with WBC of 61.7K, hemoglobin of 10.4, and platelet count of 115K on diagnosis.  Bone marrow biopsy and flow cytometry confirmed CLL/SLL.  CT scan shows mildly enlarged left axillary lymph node and periaortic lymph nodes in the retroperitoneum of the abdomen, with enlarged spleen.      Due to worsening lymphocyte count, anemia, thrombocytopenia, as well as fatigue and night sweats, he started ibrutinib on 5/4/20.      His WBC normalized, with stable hemoglobin and platelet count.    In light of his recent issues with atrial fibrillation and now on Eliquis, his ibrutinib has been on hold since August 2021.  Considering his CLL is under good control, his WBC is actually on the lower  end of normal, and with risk of atrial fibrillation and bleeding on anticoagulants, we will continue to hold ibrutinib for now and monitor his counts.  He will likely need to resume treatment at some point, but we can follow the counts and we could also consider other treatments as well for CLL.      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 He completed radiation at [facility] with planned 60 Gy x 30 fractions. He has completed radiation and noted that he did not need any more follow-up for this.    3) Abdominal aortic aneurysm: measures up to 7.6 cm on CT scan, incidentally found.  He underwent EVAR on 12/4/17.  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  He is on Plavix now.  On 5/13/19, he underwent and percutaneous aneurysm sac puncture and endo leak embolization by IR on 5/13/19.  -follow-up with vascular surgery and IR    4) Diabetes, hypertension:    -following with PCP    5) Atrial fibrillation with RVR s/p successful DCCV to SR:  -on metoprolol and Eliquis  -following with cardiology    6) CAD:   -follow-up with cardiology    7) Thrombocytopenia: stable.  It had decreased after his [facility] hospitalization, may have been from his acute illness and Bactrim.  Platelet count is around his baseline now.  -monitor CBC    8) Recent hospitalization for pericarditis, pericardial effusion at [facility]:  -he has outpatient follow-up with cardiology      Breana Perry MD  Hematology/Oncology  [facility]      Total time spent on day of visit, including review of tests, obtaining/reviewing separately obtained history, ordering medications/tests/procedures, communicating with PCP/consultants, and documenting in electronic medical record: 20  minutes      Again, thank you for allowing me to participate in the care of your patient.        Sincerely,        Breana Perry MD

## 2022-03-10 NOTE — NURSING NOTE
"Oncology Rooming Note    March 10, 2022 10:13 AM   Austin Garza is a 81 year old male who presents for:    Chief Complaint   Patient presents with     Oncology Clinic Visit     Chronic lymphocytic leukemia (CLL), B-cell      Initial Vitals: BP (!) 162/91   Pulse 86   Temp 98.7  F (37.1  C) (Tympanic)   Resp 18   Ht 1.753 m (5' 9\")   Wt 97.3 kg (214 lb 8 oz)   SpO2 98%   BMI 31.68 kg/m   Estimated body mass index is 31.68 kg/m  as calculated from the following:    Height as of this encounter: 1.753 m (5' 9\").    Weight as of this encounter: 97.3 kg (214 lb 8 oz). Body surface area is 2.18 meters squared.  No Pain (0) Comment: Data Unavailable   No LMP for male patient.  Allergies reviewed: Yes  Medications reviewed: Yes    Medications: Medication refills not needed today.  Pharmacy name entered into [employer]: [id number] - [place] - [address] [facility]    Clinical concerns: f/u       Geetha Benítez CMA              "

## 2022-03-11 NOTE — PATIENT INSTRUCTIONS
Per chart review, appt request order has been deferred until 3/18/22 by SALINAS Burris, RN, BSN, GARIMA  RN Care Coordinator  [facility]  [phone]

## 2022-03-15 ENCOUNTER — OFFICE VISIT (OUTPATIENT)
Dept: PEDIATRICS | Facility: CLINIC | Age: 82
End: 2022-03-15
Payer: COMMERCIAL

## 2022-03-15 VITALS
BODY MASS INDEX: 32.09 KG/M2 | SYSTOLIC BLOOD PRESSURE: 156 MMHG | OXYGEN SATURATION: 98 % | RESPIRATION RATE: 20 BRPM | WEIGHT: 216.7 LBS | TEMPERATURE: 98.7 F | HEART RATE: 90 BPM | HEIGHT: 69 IN | DIASTOLIC BLOOD PRESSURE: 84 MMHG

## 2022-03-15 DIAGNOSIS — N18.31 STAGE 3A CHRONIC KIDNEY DISEASE (H): ICD-10-CM

## 2022-03-15 DIAGNOSIS — E11.69 TYPE 2 DIABETES MELLITUS WITH OTHER SPECIFIED COMPLICATION, WITH LONG-TERM CURRENT USE OF INSULIN (H): ICD-10-CM

## 2022-03-15 DIAGNOSIS — C91.10 CHRONIC LYMPHOCYTIC LEUKEMIA OF B-CELL TYPE NOT HAVING ACHIEVED REMISSION (H): ICD-10-CM

## 2022-03-15 DIAGNOSIS — Z79.4 TYPE 2 DIABETES MELLITUS WITH OTHER SPECIFIED COMPLICATION, WITH LONG-TERM CURRENT USE OF INSULIN (H): ICD-10-CM

## 2022-03-15 DIAGNOSIS — I77.70 ARTERY DISSECTION (H): ICD-10-CM

## 2022-03-15 DIAGNOSIS — I48.0 PAROXYSMAL ATRIAL FIBRILLATION (H): ICD-10-CM

## 2022-03-15 DIAGNOSIS — Z01.818 PREOP GENERAL PHYSICAL EXAM: Primary | ICD-10-CM

## 2022-03-15 DIAGNOSIS — K63.5 POLYP OF COLON, UNSPECIFIED PART OF COLON, UNSPECIFIED TYPE: ICD-10-CM

## 2022-03-15 DIAGNOSIS — I10 HYPERTENSION GOAL BP (BLOOD PRESSURE) < 140/90: ICD-10-CM

## 2022-03-15 PROCEDURE — 99214 OFFICE O/P EST MOD 30 MIN: CPT | Performed by: INTERNAL MEDICINE

## 2022-03-15 PROCEDURE — U0003 INFECTIOUS AGENT DETECTION BY NUCLEIC ACID (DNA OR RNA); SEVERE ACUTE RESPIRATORY SYNDROME CORONAVIRUS 2 (SARS-COV-2) (CORONAVIRUS DISEASE [COVID-19]), AMPLIFIED PROBE TECHNIQUE, MAKING USE OF HIGH THROUGHPUT TECHNOLOGIES AS DESCRIBED BY CMS-2020-01-R: HCPCS | Performed by: INTERNAL MEDICINE

## 2022-03-15 PROCEDURE — U0005 INFEC AGEN DETEC AMPLI PROBE: HCPCS | Performed by: INTERNAL MEDICINE

## 2022-03-15 NOTE — PROGRESS NOTES
[facility]  [address]  [address]  [place] [address]  Phone: [phone]  Fax: [phone]  Primary Provider: Vanessa Munson  Pre-op Performing Provider: SHERRILL JEREZ MAI      PREOPERATIVE EVALUATION:  Today's date: 3/15/2022    Austin Garza is a 81 year old male who presents for a preoperative evaluation.    Surgical Information:  Surgery/Procedure: colonoscopy  Surgery Location: [facility]  Surgeon: Dr Kemp  Surgery Date: 3/18/22  Time of Surgery: 1130  Where patient plans to recover: At home with family  Fax number for surgical facility: [phone]    Type of Anesthesia Anticipated: General    Assessment & Plan     The proposed surgical procedure is considered LOW risk.    1. Preop general physical exam  80 yo pt with complicated PMH of CLL, IDDM type 2, HTN, recent diagnosis of pericarditis with acute pericardial effusion s/p colchicine and prednisone taper, HTN, CKD, triple A s/p stent, here for preop assessment for repeat colonoscopy - GI requested he discontinue plavix in order to remove larger polyp.  - Asymptomatic COVID-19 Virus (Coronavirus) by PCR Nose    2. Polyp of colon, unspecified part of colon, unspecified type  Reason for repeat colonoscopy - unable to remove during initial colonoscopy as he was on plavix.    3. Type 2 diabetes mellitus with other specified complication, with long-term current use of insulin (H)  Stable, A1C at goal, see instructions regarding insulin requirements.    4. Paroxysmal atrial fibrillation (H)  Is on plavix only - previously on apixaban but it is unclear what this was discontinued during his most recent hospitalization - I recommended he follow-up with MTM due to polypharmacy and complex medical history and recent medical complications.    5. Hypertension goal BP (blood pressure) < 140/90  At goal, continue medications without modification.    6. Stage 3a chronic kidney disease (H)  Stable, Cr < 2    7.  Artery dissection (H)  Holding plavix 7 days prior to procedure.    8. 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    - Long acting insulin (e.g. glargine, detemir): Take 80% of the usual evening or morning dose before surgery.    RECOMMENDATION:  APPROVAL GIVEN to proceed with proposed procedure, without further diagnostic evaluation.      Subjective     HPI related to upcoming procedure: patient is a pleasant 81-year-old male with a past medical history significant for AAA status post repair, hypertension, diabetes, CLL (on Plavix and ibrutinib), recently diagnosed atrial fibrillation with RVR s/p CHEYANNE/cardioversion (8/2/2021), heart failure with reduced ejection fraction (LVEF 45 to 50% TTE 7/2021), presumed CAD    Needs second colonoscopy for melena.  Needs to hold plavix for 7 days.    1. Acute (idiopathic) effusive-constrictive pericarditis.  2. Paroxysmal atrial fibrillation ([id number] 6 on chronic anticoagulation with apixaban and status post CHEYANNE-guided cardioversion on 08/02/2021).  3. Chronic anticoagulation (with apixaban in setting of the above).  4. Coronary artery disease (as suggested noninvasively by outside CTA chest from 07/30/2021).  5. Abdominal aortic aneurysm (status post endovascular aortic repair in 12/2017).  6. Metabolic syndrome.  7. Insulin-requiring type 2 diabetes mellitus.  8. Obesity (class 1, BMI 32.5).   9. Systemic hypertension.  10.  Hyperlipidemia.  11. Chronic kidney disease (stage 3A/3B with baseline creatinine 1.4 in the context of the above with component of postrenal obstruction).  12. Chronic lymphocytic leukemia.  13. Occult overt gastrointestinal tract bleeding (undergoing further assessment).   14. Chronic mild-moderate normocytic anemia (multifactorial in setting of the above).        Preop Questions 3/15/2022   1. Have you ever had a heart attack or stroke? No   2. Have you ever had surgery on your heart or blood vessels, such as a stent placement, a coronary artery bypass, or surgery on an artery in your head, neck, heart, or legs? YES - AAA stent   3. Do you have chest pain with activity? No   4. Do you have a history of  heart failure? No   5. Do you currently have a cold, bronchitis or symptoms of other infection? No   6. Do you have a cough, shortness of breath, or wheezing? YES - cough, daily, ongoing   7. Do you or anyone in your family have previous history of blood clots? No   8. Do you or does anyone in your family have a serious bleeding problem such as prolonged bleeding following surgeries or cuts? YES - Yes, on antiplatelet   9. Have you ever had problems with anemia or been told to take iron pills? YES - in past, hemoglobin is stable   10. Have you had any abnormal blood loss such as black, tarry or bloody stools? YES - reason for initial colonoscopy   11. Have you ever had a blood transfusion? YES -    11a. Have you ever had a transfusion reaction? UNKNOWN    12. Are you willing to have a blood transfusion if it is medically needed before, during, or after your surgery? Yes   13. Have you or any of your relatives ever had problems with anesthesia? No   14. Do you have sleep apnea, excessive snoring or daytime drowsiness? No   15. Do you have any artifical heart valves or other implanted medical devices like a pacemaker, defibrillator, or continuous glucose monitor? No   16. Do you have artificial joints? No   17. Are  you allergic to latex? No     Health Care Directive:  Patient has a Health Care Directive on file      Preoperative Review of :   reviewed - no record of controlled substances prescribed.          Review of Systems  All other systems on a 10-point review are negative, unless otherwise noted in HPI      Patient Active Problem List    Diagnosis Date Noted     Type 2 diabetes mellitus with other specified complication, with long-term current use of insulin (H) 02/15/2022     Priority: Medium     Sensorineural hearing loss, bilateral 10/06/2021     Priority: Medium     Ureterolithiasis 09/27/2021     Priority: Medium     Formatting of this note might be different from the original.  Added automatically from request for surgery [id number]       Paroxysmal atrial fibrillation (H) 07/30/2021     Priority: Medium     Artery dissection (H) 02/18/2021     Priority: Medium     Bilateral incipient cataracts 02/04/2021     Priority: Medium     Stage 3a chronic kidney disease (H) 06/03/2020     Priority: Medium     Elevated prostate specific antigen (PSA) 12/18/2019     Priority: Medium     Anemia 06/13/2019     Priority: Medium     Abdominal aortic aneurysm (AAA) (H) 11/29/2017     Priority: Medium     Chronic lymphocytic leukemia (CLL), B-cell (H) 11/17/2017     Priority: Medium     BCC (basal cell carcinoma), face 10/23/2015     Priority: Medium     Excised 10/22/15. R temporal area.       Skin lesion of back 10/23/2015     Priority: Medium     Pre-cancerous. 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 Provider to review        Past Medical History:   Diagnosis Date     AAA (abdominal aortic aneurysm)      BCC (basal cell carcinoma of skin) - face      CLL (chronic lymphocytic leukemia)      Diaphragmatic hernia      Diverticulitis of colon      Esophageal reflux      Essential hypertension      Hyperlipidemia      Irritable bowel syndrome      Macular degeneration (senile) of retina      Mumps      Squamous Cell Carcinoma, Back 1988     Type 2 diabetes mellitus      Past Surgical History:   Procedure Laterality Date     ANESTHESIA CARDIOVERSION N/A 8/2/2021    Procedure: ANESTHESIA, FOR CARDIOVERSION;  Surgeon: GENERIC ANESTHESIA PROVIDER;  Location: [facility] OR     APPENDECTOMY OPEN  1966     BONE MARROW BIOPSY, BONE SPECIMEN, NEEDLE/TROCAR N/A 11/21/2017    Procedure: BIOPSY BONE MARROW;  BONE MARROW BIOPSY;  Surgeon: Shady Garcia MD;  Location:  GI     COLONOSCOPY  2002     ENDOVASCULAR REPAIR ANEURYSM ABDOMINAL AORTA N/A 12/4/2017    Procedure: ENDOVASCULAR REPAIR ANEURYSM ABDOMINAL AORTA;  ENDOVASCULAR REPAIR ANEURYSM ABDOMINAL AORTA;  Surgeon: Milton Cazares MD;  Location:  OR     Fistulotomy with marsupialization for repair of fisture in ano  2007     IR ABDOMINAL AORTOGRAM  3/11/2019     IR VISCERAL EMBOLIZATION  5/13/2019     Multiple skin tags - resection  1998     Squamous cell skin cancer resection - back  1985-90     VASECTOMY       Current Outpatient Medications   Medication Sig Dispense Refill     bacitracin 500 UNIT/GM OINT Apply 1 Application topically as needed       blood glucose (CONTOUR NEXT TEST) test strip Use to test blood sugar 3-4 times daily or as directed. 100 strip 6     blood glucose monitoring (NO BRAND SPECIFIED) meter device kit Use to test blood sugar 2 times daily or as directed. 1 kit 0     clopidogrel (PLAVIX) 75 MG tablet Take 1 tablet (75 mg) by mouth daily 90 tablet 3  "    dutasteride (AVODART) 0.5 MG capsule Take 0.5 mg by mouth        insulin glargine (LANTUS PEN) 100 UNIT/ML pen Inject 28 Units Subcutaneous daily 30 mL 1     insulin pen needle (BD SHRUTHI U/F) 32G X 4 MM miscellaneous Use 4 daily as directed. 300 each 5     metoprolol succinate ER (TOPROL-XL) 50 MG 24 hr tablet Take 1 tablet (50 mg) by mouth daily 90 tablet 3     rosuvastatin (CRESTOR) 20 MG tablet Take 1 tablet (20 mg) by mouth daily 90 tablet 3       Allergies   Allergen Reactions     Ace Inhibitors Cough     cough  cough        Social History     Tobacco Use     Smoking status: Former Smoker     Packs/day: 0.50     Years: 20.00     Pack years: 10.00     Quit date: 1975     Years since quittin.2     Smokeless tobacco: Former User   Substance Use Topics     Alcohol use: Yes     Alcohol/week: 10.0 - 14.0 standard drinks     Types: 10 - 14 Standard drinks or equivalent per week     Comment: 3 drinks a day/wine     Family History   Problem Relation Age of Onset     Cerebrovascular Disease Father         x 2     Hypertension Mother      C.A.D. Mother      Cancer Mother         skin     Eye Disorder Mother         glaucoma     Prostate Cancer No family hx of      Cancer - colorectal No family hx of      Glaucoma No family hx of      Macular Degeneration No family hx of      History   Drug Use No         Objective     BP (!) 156/84   Pulse 90   Temp 98.7  F (37.1  C) (Tympanic)   Resp 20   Ht 1.753 m (5' 9\")   Wt 98.3 kg (216 lb 11.2 oz)   SpO2 98%   BMI 32.00 kg/m      Physical Exam    GENERAL APPEARANCE: healthy, alert and no distress     EYES: EOMI,  PERRL     HENT: nose and mouth without ulcers or lesions and Ear exam deferred due to hearing aids     NECK: no adenopathy, no asymmetry, masses, or scars and thyroid normal to palpation     RESP: lungs clear to auscultation - no rales, rhonchi or wheezes     CV: regular rates and rhythm, normal S1 S2, no S3 or S4 and no murmur, click or rub     ABDOMEN: "  soft, nontender, no HSM or masses and bowel sounds normal     MS: extremities normal- no gross deformities noted, no evidence of inflammation in joints, FROM in all extremities.     SKIN: no suspicious lesions or rashes     NEURO: Normal strength and tone, sensory exam grossly normal, mentation intact and speech normal     PSYCH: mentation appears normal. and affect normal/bright     LYMPHATICS: No cervical adenopathy    Recent Labs   Lab Test 03/07/22  0959 02/15/22  0734 12/06/21  0959 08/10/21  0936 08/02/21  0657 07/31/21  0546 07/30/21  0808   HGB 12.7*  --  11.7*   < > 12.5*  --  12.5*   PLT 66*  --  81*   < > 120*  --  103*   INR  --   --   --   --  1.24*  --   --     140  --    < > 137   < >  --    POTASSIUM 4.4 4.5  --    < > 4.0  4.0   < >  --    CR 1.45* 1.39*  --    < > 1.69*   < >  --    A1C  --  6.2*  --   --   --   --  7.0*    < > = values in this interval not displayed.        Diagnostics:  No labs were ordered during this visit.   No EKG required for low risk surgery (cataract, skin procedure, breast biopsy, etc).    Revised Cardiac Risk Index (RCRI):  The patient has the following serious cardiovascular risks for perioperative complications:   - Coronary Artery Disease (MI, positive stress test, angina, Qs on EKG) = 1 point   - Diabetes Mellitus (on Insulin) = 1 point     RCRI Interpretation: 2 points: Class III (moderate risk - 6.6% complication rate)     Estimated Functional Capacity: CANNOT perform 4 METS without symptoms  Is on beta blocker and statin         Signed Electronically by: Inderjit Goodwin MD  Copy of this evaluation report is provided to requesting physician.

## 2022-03-15 NOTE — PATIENT INSTRUCTIONS
Lower dose of lantus to 24 units morning of surgery  Continue to hold plavix - can restart after procedure  Take other medications as you normally do  Follow-up with MTM in the next 1-2 weeks to recheck blood pressure and go over medications.  Preparing for Your Surgery  Getting started  A nurse will call you to review your health history and instructions. They will give you an arrival time based on your scheduled surgery time. Please be ready to share:    Your doctor's clinic name and phone number    Your medical, surgical and anesthesia history    A list of allergies and sensitivities    A list of medicines, including herbal treatments and over-the-counter drugs    Whether the patient has a legal guardian (ask how to send us the papers in advance)  Please tell us if you're pregnant--or if there's any chance you might be pregnant. Some surgeries may injure a fetus (unborn baby), so they require a pregnancy test. Surgeries that are safe for a fetus don't always need a test, and you can choose whether to have one.   If you have a child who's having surgery, please ask for a copy of Preparing for Your Child's Surgery.    Preparing for surgery    Within 30 days of surgery: Have a pre-op exam (sometimes called an H&P, or History and Physical). This can be done at a clinic or pre-operative center.  ? If you're having a , you may not need this exam. Talk to your care team.    At your pre-op exam, talk to your care team about all medicines you take. If you need to stop any medicines before surgery, ask when to start taking them again.  ? We do this for your safety. Many medicines can make you bleed too much during surgery. Some change how well surgery (anesthesia) drugs work.    Call your insurance company to let them know you're having surgery. (If you don't have insurance, call [phone].)    Call your clinic if there's any change in your health. This includes signs of a cold or flu (sore throat, runny nose,  cough, rash, fever). It also includes a scrape or scratch near the surgery site.    If you have questions on the day of surgery, call your hospital or surgery center.  COVID testing  You may need to be tested for COVID-19 before having surgery. If so, your surgical team will give you instructions for scheduling this test, separate from your preoperative history and physical.  Eating and drinking guidelines  For your safety: Unless your surgeon tells you otherwise, follow the guidelines below.    Eat and drink as usual until 8 hours before surgery. After that, no food or milk.    Drink clear liquids until 2 hours before surgery. These are liquids you can see through, like water, Gatorade and Propel Water. You may also have black coffee and tea (no cream or milk).    Nothing by mouth within 2 hours of surgery. This includes gum, candy and breath mints.    If you drink alcohol: Stop drinking it the night before surgery.    If your care team tells you to take medicine on the morning of surgery, it's okay to take it with a sip of water.  Preventing infection    Shower or bathe the night before and morning of your surgery. Follow the instructions your clinic gave you. (If no instructions, use regular soap.)    Don't shave or clip hair near your surgery site. We'll remove the hair if needed.    Don't smoke or vape the morning of surgery. You may chew nicotine gum up to 2 hours before surgery. A nicotine patch is okay.  ? Note: Some surgeries require you to completely quit smoking and nicotine. Check with your surgeon.    Your care team will make every effort to keep you safe from infection. We will:  ? Clean our hands often with soap and water (or an alcohol-based hand rub).  ? Clean the skin at your surgery site with a special soap that kills germs.  ? Give you a special gown to keep you warm. (Cold raises the risk of infection.)  ? Wear special hair covers, masks, gowns and gloves during surgery.  ? Give antibiotic  medicine, if prescribed. Not all surgeries need antibiotics.  What to bring on the day of surgery    Photo ID and insurance card    Copy of your health care directive, if you have one    Glasses and hearing aides (bring cases)  ? You can't wear contacts during surgery    Inhaler and eye drops, if you use them (tell us about these when you arrive)    CPAP machine or breathing device, if you use them    A few personal items, if spending the night    If you have . . .  ? A pacemaker, ICD (cardiac defibrillator) or other implant: Bring the ID card.  ? An implanted stimulator: Bring the remote control.  ? A legal guardian: Bring a copy of the certified (court-stamped) guardianship papers.  Please remove any jewelry, including body piercings. Leave jewelry and other valuables at home.  If you're going home the day of surgery    You must have a responsible adult drive you home. They should stay with you overnight as well.    If you don't have someone to stay with you, and you aren't safe to go home alone, we may keep you overnight. Insurance often won't pay for this.  After surgery  If it's hard to control your pain or you need more pain medicine, please call your surgeon's office.  Questions?   If you have any questions for your care team, list them here: _________________________________________________________________________________________________________________________________________________________________________ ____________________________________ ____________________________________ ____________________________________  For informational purposes only. Not to replace the advice of your health care provider. Copyright   2003, 2019 [facility]. All rights reserved. Clinically reviewed by Chanel Rubin MD. [employer] [id number] - REV 07/21.

## 2022-03-16 LAB — SARS-COV-2 RNA RESP QL NAA+PROBE: NEGATIVE

## 2022-03-17 ENCOUNTER — TELEPHONE (OUTPATIENT)
Dept: PEDIATRICS | Facility: CLINIC | Age: 82
End: 2022-03-17
Payer: COMMERCIAL

## 2022-03-17 NOTE — TELEPHONE ENCOUNTER
Nancy called and LVM on PAL4 direct line. She is looking for a H&P that was done on March 15th for this pt to be faxed to [phone]. Nancy would like a call back @ [phone] to let her this has been completed. This is for pt's surgery tomorrow. Routing to PAL3.    Betty Claire, EMT at 10:47 AM on March 17, 2022  [facility]  [phone]

## 2022-03-17 NOTE — TELEPHONE ENCOUNTER
Received call from Nancy at [facility]    Pre-op was faxed to the wrong number    The correct number is [phone]    Please refax STAT pt is having surgery tomorrow    Thank you  Agapito Weathers RN on 3/17/2022 at 2:50 PM

## 2022-04-04 ENCOUNTER — VIRTUAL VISIT (OUTPATIENT)
Dept: PHARMACY | Facility: CLINIC | Age: 82
End: 2022-04-04
Payer: COMMERCIAL

## 2022-04-04 ENCOUNTER — TELEPHONE (OUTPATIENT)
Dept: PEDIATRICS | Facility: CLINIC | Age: 82
End: 2022-04-04
Payer: COMMERCIAL

## 2022-04-04 DIAGNOSIS — I10 HYPERTENSION GOAL BP (BLOOD PRESSURE) < 140/90: ICD-10-CM

## 2022-04-04 DIAGNOSIS — C91.10 CLL (CHRONIC LYMPHOCYTIC LEUKEMIA) (H): ICD-10-CM

## 2022-04-04 DIAGNOSIS — E11.69 TYPE 2 DIABETES MELLITUS WITH OTHER SPECIFIED COMPLICATION, WITH LONG-TERM CURRENT USE OF INSULIN (H): ICD-10-CM

## 2022-04-04 DIAGNOSIS — R97.20 ELEVATED PROSTATE SPECIFIC ANTIGEN (PSA): Primary | ICD-10-CM

## 2022-04-04 DIAGNOSIS — Z79.4 TYPE 2 DIABETES MELLITUS WITH OTHER SPECIFIED COMPLICATION, WITH LONG-TERM CURRENT USE OF INSULIN (H): ICD-10-CM

## 2022-04-04 DIAGNOSIS — I71.40 ABDOMINAL AORTIC ANEURYSM (AAA) WITHOUT RUPTURE (H): ICD-10-CM

## 2022-04-04 DIAGNOSIS — I30.9 ACUTE PERICARDIAL EFFUSION: ICD-10-CM

## 2022-04-04 DIAGNOSIS — E78.5 HYPERLIPIDEMIA LDL GOAL <100: ICD-10-CM

## 2022-04-04 DIAGNOSIS — I48.0 PAROXYSMAL ATRIAL FIBRILLATION (H): ICD-10-CM

## 2022-04-04 PROCEDURE — 99605 MTMS BY PHARM NP 15 MIN: CPT | Performed by: PHARMACIST

## 2022-04-04 PROCEDURE — 99607 MTMS BY PHARM ADDL 15 MIN: CPT | Performed by: PHARMACIST

## 2022-04-04 RX ORDER — DUTASTERIDE 0.5 MG/1
0.5 CAPSULE, LIQUID FILLED ORAL AT BEDTIME
Qty: 90 CAPSULE | Refills: 0 | Status: SHIPPED | OUTPATIENT
Start: 2022-04-04 | End: 2022-05-03 | Stop reason: SINTOL

## 2022-04-04 RX ORDER — DULAGLUTIDE 0.75 MG/.5ML
0.75 INJECTION, SOLUTION SUBCUTANEOUS
Qty: 2 ML | Refills: 0 | Status: SHIPPED | OUTPATIENT
Start: 2022-04-04 | End: 2022-05-03 | Stop reason: DRUGHIGH

## 2022-04-04 NOTE — LETTER
"Recommended To-Do List      Prepared on: 4/8/2022     You can get the best results from your medications by completing the items on this \"To-Do List.\"      Bring your To-Do List when you go to your doctor. And, share it with your family or caregivers.    My To-Do List:  What we talked about: What I should do:   An issue with your medication    Klaudia/Pharmacist plans to consult Dr. Perry (oncologist) and possibly Dr. Wheeler (vascular) regarding changing the medication you are taking from clopidogrel (PLAVIX) may be a good idea due to the low platelets. Continue taking as directed until you hear from provider.          What we talked about: What I should do:   A medication that is not working    Change the medication you are taking from Trulicity to LANTUS          What we talked about: What I should do:   The importance of taking your medication as intended    Reminder to take your medication as prescribed for dutasteride (AVODART)          What we talked about: What I should do:                     "

## 2022-04-04 NOTE — LETTER
_  Medication List        Prepared on: 4/8/2022     Bring your Medication List when you go to the doctor, hospital, or   emergency room. And, share it with your family or caregivers.     Note any changes to how you take your medications.  Cross out medications when you no longer use them.    Medication How I take it Why I use it Prescriber   bacitracin 500 UNIT/GM OINT Apply 1 Application topically as needed Skin irritation Patient Reported   blood glucose (CONTOUR NEXT TEST) test strip Use to test blood sugar 3-4 times daily or as directed. Diabetes  Fabrice Melgoza MD   blood glucose monitoring (NO BRAND SPECIFIED) meter device kit Use to test blood sugar 2 times daily or as directed. Type 2 diabetes mellitus  Vanessa Munson MD   clopidogrel (PLAVIX) 75 MG tablet Take 1 tablet (75 mg) by mouth daily Abdominal aortic aneurysm  Raheel Schaffer MD   dulaglutide (TRULICITY) 0.75 MG/0.5ML pen Inject 0.75 mg Subcutaneous every 7 days Type 2 diabetes mellitus  Vanessa Munson MD   dutasteride (AVODART) 0.5 MG capsule Take 1 capsule (0.5 mg) by mouth At Bedtime Elevated Prostate Specific Antigen (PSA) Vanessa Munson MD   insulin glargine (LANTUS PEN) 100 UNIT/ML pen Inject 16 Units Subcutaneous daily when start Trulicity Type 2 diabetes mellitus  Vanessa Munson MD   metoprolol succinate ER (TOPROL-XL) 50 MG 24 hr tablet Take 1 tablet (50 mg) by mouth daily Hypertension Fabrice Melgoza MD   rosuvastatin (CRESTOR) 20 MG tablet Take 1 tablet (20 mg) by mouth daily cholesterol Vanessa Munson MD         Add new medications, over-the-counter drugs, herbals, vitamins, or  minerals in the blank rows below.    Medication How I take it Why I use it Prescriber                          Allergies:      ace inhibitors        Side effects I have had:              Other Information:             My notes and questions:

## 2022-04-04 NOTE — PATIENT INSTRUCTIONS
Recommendations from today's MTM visit:                                                      Reschedule the Colonoscopy:  Derek Gonzales MD    [address]    [place] [address]    Phone: [phone]        I am going to consult primary care provider to discuss your low platelets, you may need to set up follow-up with Vascular, Dr. Wheeler who is the replacement for Dr. Cazares. I will follow-up with you on this regarding what you need to do next.     Blood sugars:  Your insurance requires a prior authorization to see if it will cover Trulicity. If you do get the medication, Start Trulicity 0.75mg weekly and Decrease Lantus to 16 units daily. Then follow-up with me/MTM pharmacist in 3-4 weeks later to discuss next steps to come off insulin completely.    History of enlarged prostate and blood in urine:  Restart dutasteride, new Rx sent to the pharmacy for you.       Follow-up: 3-4 weeks after starting Trulicity, other 6 months medication review again.    It was great to speak with you today.  I value your experience and would be very thankful for your time with providing feedback on our clinic survey. You may receive a survey via email or text message in the next few days.     To schedule another MTM appointment, please call the clinic directly or you may call the MTM scheduling line at [phone] or toll-free at [phone].     My Clinical Pharmacist's contact information:                                                      Please feel free to contact me with any questions or concerns you have.      Klaudia Santa PharmD  Medication Therapy Management Pharmacist  Pager#: [phone]

## 2022-04-04 NOTE — PROGRESS NOTES
Medication Therapy Management (MTM) Encounter    ASSESSMENT:                            Medication Adherence/Access: No issues identified    history of Acute pericarditis with acute pericardial effusion: resolved    Type 2 Diabetes: Patient is meeting A1c goal of < 7-7.5%. he may be a candidate for GLP-1 and possibility to get off insulin.     AAA (repair in 2017)/Afib/HTN/Lipids: worsening PLT - will outreach to hematology regarding ongoing Plavix therapy.    BPH: patient should restart dutasteride. Concern for recurrence of symptoms if remains off therapy. No side effects while on therapy.    CLL/SLL: continue with oncologist.    PLAN:                            1. Routing to PCP and hematology for considerations of ongoing clopidogrel therapy. May need to consult Vascular, Dr. Wheeler. Plan for a visit and discuss ongoing Plavix indication. Also, will want to discuss in the setting Afib and ongoing thrombocytopenia no DOAC/VKA on board currently with elevated BUY4CF3-Jwnd score.     2. Start Trulicity, sent Rx. After our visit I called insurance to verify coverage. PA was approved 4/6/22. Recommend Start Trulicity 0.75mg weekly. Decrease Lantus to 16 units daily.  Next visit consider stopping Lantus completely, possibly need Trulicity 1.5mg dose.     3. Restart dutasteride.     Follow-up: 4/12/22 to review plan #2 with patient.     SUBJECTIVE/OBJECTIVE:                          Austin Garza is a 81 year old male called for a follow-up visit.  Today's visit is a follow-up MTM visit from 11/10/21, first of 2022.      Reason for visit: patient is wondering about getting off insulin. Otherwise he reports doing well.    Allergies/ADRs: Reviewed in chart  Past Medical History: Reviewed in chart  Tobacco: He reports that he quit smoking about 47 years ago. He has a 10.00 pack-year smoking history. He has quit using smokeless tobacco.  Alcohol: not currently using regularly    Medication Adherence/Access: no issues  "reported. Managing his medications on his own. Fills at [facility].     history of Acute pericarditis with acute pericardial effusion: he is not completely off prednisone, Bactrim and colchicine. He was seen at [facility] for this. No concerns reported today.     Diabetes: Taking Lantus 30 units daily. No side effects but would like to stop if possible.  SMBG: this morning was 146. Usually 120-130's. He notes higher blood sugar in the morning likely from drinking milk. He denies any low blood sugars. After he swims at the [place] 100-110's.   Lab Results   Component Value Date    A1C 6.2 02/15/2022    A1C 7.0 07/30/2021    A1C 5.9 04/05/2021    A1C 6.7 01/15/2021    A1C 5.6 08/23/2020    A1C 5.5 07/14/2020    A1C 6.3 12/31/2019       AAA (repair in 2017)/Afib/HTN/Lipids: He patient reports ongoing dark stools which he thought was from prednisone at first but reports ongoing. However, then he realized he was on iron but stopped due to dark stool. Therefore no longer on iron. He has bruising but no active bleeding concerns.   He does have a blood pressure monitor at home but does not check often. 163/106 (82).   He informs me he had to reschedule his colonoscopy wasn't fully emptied.  Medications:  - hold Eliquis 5mg twice daily due to concern for bleed, but AFib [id number] > 2.  - metoprolol XR 50mg once daily  - rosuvastatin 20mg daily   - clopidogrel 75 mg daily for AAA   Per cardiology Dr Ordonez from [facility] note on 12/16/21, \"the patient will contact his primary care, [facility] urology, and outside oncology teams about the ability to resume apixaban for his atrial fibrillation with elevated [id number] score. Factors that would need to be remedied for resumption would be his previous thrombocytopenia, hematuria, and concerns for occult overt gastrointestinal tract bleeding, which is currently being evaluated by his primary care team...the patient will discuss further with his local cardiology services and " "consider other alternatives to indefinite anticoagulation (e.g., WATCHMAN implantation with shorter, defined course of routine anticoagulation).\"  In the past there has been confusion of patient preference with working with [facility] and not CV here but today patient reports he prefers to work Cards team here if possible, more convenient location.  BP Readings from Last 3 Encounters:   03/15/22 (!) 156/84   03/10/22 (!) 162/91   02/15/22 126/80     Hemoglobin   Date Value Ref Range Status   03/07/2022 12.7 (L) 13.3 - 17.7 g/dL Final   06/01/2021 13.5 13.3 - 17.7 g/dL Final     WBC   Date Value Ref Range Status   06/01/2021 12.7 (H) 4.0 - 11.0 10e9/L Final     WBC Count   Date Value Ref Range Status   03/07/2022 6.3 4.0 - 11.0 10e3/uL Final     Platelet Count   Date Value Ref Range Status   03/07/2022 66 (L) 150 - 450 10e3/uL Final   06/01/2021 119 (L) 150 - 450 10e9/L Final     Recent Labs   Lab Test 02/15/22  0734 04/05/21  0913 11/17/16  0731 10/23/15  0903 08/05/14  0755   CHOL 120 166   < > 109 131   HDL 33* 57   < > 43 47   LDL 58 96   < > 54 63   TRIG 147 64   < > 62 105   CHOLHDLRATIO  --   --   --  2.5 2.8    < > = values in this interval not displayed.       BPH: patient is NOT taking dutasteride 0.5 tablet daily started by [facility] team. Patient was wondering what the dutasteride was for. He reports first going to [facility] in September for hematuria. He ran out of the Rx and was told needs to follow-up with urologist so he isn't on it. He reports out for maybe a month. He feels prior to starting urinary frequency was a significant problem. He feels since being off may be going to bathroom a little more often not as bad as before he started therapy.   PSA   Date Value Ref Range Status   12/31/2019 5.49 (H) 0 - 4 ug/L Final     Comment:     Assay Method:  Chemiluminescence using Siemens Vista analyzer     CLL/SLL: BLACKWOOD stage III, with lymphocytosis, lymphadenopathy, splenomegaly, and anemia per oncology note. Follows with " Dr. Perry, last visit 3/10/22.   History of worsening lymphocyte count, anemia, thrombocytopenia, as well as fatigue and night sweats, he started ibrutinib on 5/4/20, but was held 5/2021 due to new onset of afib and concern for bleed from anticoagulants. Recommend continue to hold therapy.    Today's Vitals: There were no vitals taken for this visit.  ----------------      I spent 40 minutes with this patient today. All changes were made via collaborative practice agreement with Vanessa Munson MD. A copy of the visit note was provided to the patient's provider(s).    The patient was sent via TRAKLOK a summary of these recommendations.     Klaudia Santa, PharmD  Medication Therapy Management Pharmacist  Pager#: [phone]    Telemedicine Visit Details  Type of service:  Telephone visit  Start Time: 10:00 Am  End Time: 10:40AM  Originating Location (patient location): [place]  Distant Location (provider location):  [facility]     Medication Therapy Recommendations  Abdominal aortic aneurysm (AAA) (H)    Current Medication: clopidogrel (PLAVIX) 75 MG tablet   Rationale: Unsafe medication for the patient - Adverse medication event - Safety   Recommendation: Change Medication - aspirin 81 MG EC tablet   Status: Contact Provider - Awaiting Response         Diabetes mellitus, type 2 (H)    Current Medication: dulaglutide (TRULICITY) 0.75 MG/0.5ML pen   Rationale: More effective medication available - Ineffective medication - Effectiveness   Recommendation: Change Medication - LANTUS SC   Status: Accepted per CPA         Elevated prostate specific antigen (PSA)    Current Medication: dutasteride (AVODART) 0.5 MG capsule   Rationale: Does not understand instructions - Adherence - Adherence   Recommendation: Provide Adherence Intervention   Status: Accepted per CPA

## 2022-04-04 NOTE — TELEPHONE ENCOUNTER
Prior Authorization Retail Medication Request    Medication/Dose: Trulicity 0.75mg/0.5ml injector pen  ICD code (if different than what is on RX):  E11.69&Z79.4  Previously Tried and Failed:  Jossue Ba  Rationale:  Blood sugars rising despite use of Lantus      Insurance Name:  BCBS Medicare Advantage  Insurance ID:  [id number]      Pharmacy Information (if different than what is on RX)  Name:  [facility]  Phone:  [phone]

## 2022-04-04 NOTE — Clinical Note
January Perry, I am wondering your thoughts on Benji's continuation on clopidogrel or possible switch to aspiring 81mg with his worsening PLT? I was reviewing his history and indication of Plavix, which I suspect I may need to consult vascular as well if patient does need to stay on Plavix or switch to aspirin (he may have been lose to follow-up with vascular since incidence). Also fyi pending colonoscopy he had to reschedule. Thank you for your input!

## 2022-04-04 NOTE — LETTER
April 8, 2022  Austin Garza  [address] [place] [address]    Dear OCTAVIANO Flores [facility] [place]        Thank you for talking with me on Apr 4, 2022 about your health and medications. As a follow-up to our conversation, I have included two documents:      1. Your Recommended To-Do List has steps you should take to get the best results from your medications.  2. Your Medication List will help you keep track of your medications and how to take them.    If you want to talk about these documents, please call Klaudia Santa RPH at phone: [phone], Monday-Friday 8-4:30pm.    I look forward to working with you and your doctors to make sure your medications work well for you.    Sincerely,    Klaudia Santa RPH  [facility] Pharmacist, [facility]

## 2022-04-06 NOTE — TELEPHONE ENCOUNTER
Prior Authorization Approval    Authorization Effective Date: 1/6/2022  Authorization Expiration Date: 4/6/2023  Medication: Trulicity 0.75mg/0.5ml injector pen-APPROVED  Approved Dose/Quantity:   Reference #:     Insurance Company: Other (see comments)Comment:  Prime [phone]  Expected CoPay:       CoPay Card Available:      Foundation Assistance Needed:    Which Pharmacy is filling the prescription (Not needed for infusion/clinic administered): [id number] - [place], [place] - [address] AT [facility]  Pharmacy Notified: Yes  Patient Notified: No    **Instructed pharmacy to notify patient when script is ready to /ship.**

## 2022-04-06 NOTE — TELEPHONE ENCOUNTER
Central Prior Authorization Team   Phone: [phone]      PA Initiation    Medication: Trulicity 0.75mg/0.5ml injector pen-Initiated  Insurance Company: Other (see comments)Comment:  Prime [phone]  Pharmacy Filling the Rx: [id number] - [place], [place] - [address] AT [facility]  Filling Pharmacy Phone: [phone]  Filling Pharmacy Fax:    Start Date: 4/6/2022

## 2022-04-12 ENCOUNTER — VIRTUAL VISIT (OUTPATIENT)
Dept: PHARMACY | Facility: CLINIC | Age: 82
End: 2022-04-12
Payer: COMMERCIAL

## 2022-04-12 DIAGNOSIS — Z79.4 TYPE 2 DIABETES MELLITUS WITH OTHER SPECIFIED COMPLICATION, WITH LONG-TERM CURRENT USE OF INSULIN (H): ICD-10-CM

## 2022-04-12 DIAGNOSIS — I48.0 PAROXYSMAL ATRIAL FIBRILLATION (H): ICD-10-CM

## 2022-04-12 DIAGNOSIS — I10 HYPERTENSION GOAL BP (BLOOD PRESSURE) < 140/90: Primary | ICD-10-CM

## 2022-04-12 DIAGNOSIS — I71.40 ABDOMINAL AORTIC ANEURYSM (AAA) WITHOUT RUPTURE (H): ICD-10-CM

## 2022-04-12 DIAGNOSIS — E78.5 HYPERLIPIDEMIA LDL GOAL <100: ICD-10-CM

## 2022-04-12 DIAGNOSIS — E11.69 TYPE 2 DIABETES MELLITUS WITH OTHER SPECIFIED COMPLICATION, WITH LONG-TERM CURRENT USE OF INSULIN (H): ICD-10-CM

## 2022-04-12 DIAGNOSIS — R97.20 ELEVATED PROSTATE SPECIFIC ANTIGEN (PSA): ICD-10-CM

## 2022-04-12 PROCEDURE — 99607 MTMS BY PHARM ADDL 15 MIN: CPT | Performed by: PHARMACIST

## 2022-04-12 PROCEDURE — 99606 MTMS BY PHARM EST 15 MIN: CPT | Performed by: PHARMACIST

## 2022-04-12 RX ORDER — METOPROLOL TARTRATE 50 MG
TABLET ORAL
Qty: 135 TABLET | Refills: 0 | Status: SHIPPED | OUTPATIENT
Start: 2022-04-12 | End: 2022-06-22

## 2022-04-12 NOTE — PROGRESS NOTES
Medication Therapy Management (MTM) Encounter    ASSESSMENT:                            Medication Adherence/Access: No issues identified    Type 2 Diabetes: Patient is meeting A1c goal of < 7-7.5%. continue Trulicity. Suggest he slow down on self tapering basal and stay consistent.    AAA (repair in 2017)/Afib/HTN/Lipids: worsening PLT per hematology ok to continue on antiplatelet therapy and monitor. Patient may need follow-up with vascular and cardiology team unclear if follow-up with [facility] vs local location. Blood pressure not at goal recommend adjustment in regimen patient prefers to increase beta-blocker vs add new agent for hypertension.    BPH: stable, continue to monitor dribble incidence.    PLAN:                            1. Stay on Lantus 12 units daily if lows then reduce insulin by 10-20%.  2. Educated on trulicity.  3. Increase metoprolol to 75mg daily (patient prefers twice daily).    Follow-up: 5/3 follow-up. 1-2 weeks patient will send me mychart on vitals.    SUBJECTIVE/OBJECTIVE:                          Austin Garza is a 81 year old male called for a follow-up visit.  Today's visit is a follow-up MTM visit from 4/4/22.     Reason for visit: blood pressure and trulicity.     Allergies/ADRs: Reviewed in chart  Past Medical History: Reviewed in chart  Tobacco: He reports that he quit smoking about 47 years ago. He has a 10.00 pack-year smoking history. He has quit using smokeless tobacco.  Alcohol: not currently using regularly    Medication Adherence/Access: no issues reported. Managing his medications on his own. Fills at [facility].     Diabetes: Taking Trulicity 0..75 mg weekly started ~4/8, Lantus 16 units daily recommend but he is taking differently see below. No side effects.  Blood sugar readings:   4/8 86 at lunch, 114 before dinner   4/9 127 before breakfast, Lantus 12 units 97 before lunch. 128 before dinner  4/10 no morning blood sugar, Lantus 10 units, 153 before lunch,  "157 befor dinner  4/11 171 before breakfast, Lantus 10 units, 147 before lunch, no before dinner  4/12: 133 before breakfast, Lantus 12 units, missed lunch.     Lab Results   Component Value Date    A1C 6.2 02/15/2022    A1C 7.0 07/30/2021    A1C 5.9 04/05/2021    A1C 6.7 01/15/2021    A1C 5.6 08/23/2020    A1C 5.5 07/14/2020    A1C 6.3 12/31/2019       AAA (repair in 2017)/Afib/HTN/Lipids: Home blood pressure: he started checking Saturday. High in the morning SBP's 170 repeated then down to 160/90's. He notices the lower in the evening is lower. Last night 131/78 (83). Over the phone 163/106 (82). Not acute hypertension symptoms.  Medications:  - hold Eliquis 5mg twice daily due to concern for bleed, but AFib [id number] > 2.  - metoprolol XR 50mg once daily  - rosuvastatin 20mg daily   - clopidogrel 75 mg daily for AAA   Per cardiology Dr Ordonez from [facility] note on 12/16/21, \"the patient will contact his primary care, [facility] urology, and outside oncology teams about the ability to resume apixaban for his atrial fibrillation with elevated [id number]-WQPg score. Factors that would need to be remedied for resumption would be his previous thrombocytopenia, hematuria, and concerns for occult overt gastrointestinal tract bleeding, which is currently being evaluated by his primary care team...the patient will discuss further with his local cardiology services and consider other alternatives to indefinite anticoagulation (e.g., WATCHMAN implantation with shorter, defined course of routine anticoagulation).\"  In the past there has been confusion of patient preference with working with [facility] and not CV here but today patient reports he prefers to work Cards team here if possible, more convenient location.    BP Readings from Last 3 Encounters:   03/15/22 (!) 156/84   03/10/22 (!) 162/91   02/15/22 126/80     Hemoglobin   Date Value Ref Range Status   03/07/2022 12.7 (L) 13.3 - 17.7 g/dL Final   06/01/2021 13.5 13.3 - 17.7 " g/dL Final     WBC   Date Value Ref Range Status   06/01/2021 12.7 (H) 4.0 - 11.0 10e9/L Final     WBC Count   Date Value Ref Range Status   03/07/2022 6.3 4.0 - 11.0 10e3/uL Final     Platelet Count   Date Value Ref Range Status   03/07/2022 66 (L) 150 - 450 10e3/uL Final   06/01/2021 119 (L) 150 - 450 10e9/L Final     Recent Labs   Lab Test 02/15/22  0734 04/05/21  0913 11/17/16  0731 10/23/15  0903 08/05/14  0755   CHOL 120 166   < > 109 131   HDL 33* 57   < > 43 47   LDL 58 96   < > 54 63   TRIG 147 64   < > 62 105   CHOLHDLRATIO  --   --   --  2.5 2.8    < > = values in this interval not displayed.        BPH: patient is taking dutasteride 0.5 tablet daily since our last visit. He reports after starting he starting have a dribble issue but admits this is minimal and not sure if truly due to medication.   He reports first going to [facility] in September for hematuria.   PSA   Date Value Ref Range Status   12/31/2019 5.49 (H) 0 - 4 ug/L Final     Comment:     Assay Method:  Chemiluminescence using Siemens Vista analyzer       Today's Vitals: There were no vitals taken for this visit.  ----------------      I spent 20 minutes with this patient today. All changes were made via collaborative practice agreement with Vanessa Munson MD. A copy of the visit note was provided to the patient's provider(s).    The patient was sent via [employer] a summary of these recommendations.     Alayna RobertoD  Medication Therapy Management Pharmacist  Pager#: [phone]    Telemedicine Visit Details  Type of service:  Telephone visit  Start Time: 2p  End Time: 2:20 PM  Originating Location (patient location): Home  Distant Location (provider location):  [facility]     Medication Therapy Recommendations  Hypertension goal BP (blood pressure) < 140/90    Current Medication: metoprolol tartrate (LOPRESSOR) 50 MG tablet   Rationale: Dose too low - Dosage too low - Effectiveness   Recommendation: Increase Dose    Status: Accepted per CPA         Type 2 diabetes mellitus with other specified complication, with long-term current use of insulin (H)    Current Medication: insulin glargine (LANTUS PEN) 100 UNIT/ML pen   Rationale: Does not understand instructions - Adherence - Adherence   Recommendation: Provide Adherence Intervention   Status: Patient Agreed - Adherence/Education

## 2022-04-20 NOTE — PATIENT INSTRUCTIONS
"Recommendations from today's MTM visit:                                                      1. Stay on Lantus 12 units daily if lows then reduce insulin by 10-20%.  2. Educated on trulicity.  3. Increase metoprolol to 75mg daily (patient prefers twice daily).    Follow-up: 5/3 follow-up. 1-2 weeks patient will send me mychart on vitals.    It was great speaking with you today.  I value your experience and would be very thankful for your time in providing feedback in our clinic survey. In the next few days, you may receive an email or text message from [employer] with a link to a survey related to your  clinical pharmacist.\"     To schedule another MTM appointment, please call the clinic directly or you may call the MTM scheduling line at [phone] or toll-free at [phone].     My Clinical Pharmacist's contact information:                                                      Please feel free to contact me with any questions or concerns you have.      Klaudia Santa, PharmD  Medication Therapy Management Pharmacist    "

## 2022-05-02 DIAGNOSIS — Z79.4 TYPE 2 DIABETES MELLITUS WITH OTHER SPECIFIED COMPLICATION, WITH LONG-TERM CURRENT USE OF INSULIN (H): ICD-10-CM

## 2022-05-02 DIAGNOSIS — E11.69 TYPE 2 DIABETES MELLITUS WITH OTHER SPECIFIED COMPLICATION, WITH LONG-TERM CURRENT USE OF INSULIN (H): ICD-10-CM

## 2022-05-03 ENCOUNTER — VIRTUAL VISIT (OUTPATIENT)
Dept: PHARMACY | Facility: CLINIC | Age: 82
End: 2022-05-03
Payer: COMMERCIAL

## 2022-05-03 DIAGNOSIS — I48.91 ATRIAL FIBRILLATION, UNSPECIFIED TYPE (H): ICD-10-CM

## 2022-05-03 DIAGNOSIS — R41.3 MEMORY LOSS: ICD-10-CM

## 2022-05-03 DIAGNOSIS — R53.83 FATIGUE, UNSPECIFIED TYPE: ICD-10-CM

## 2022-05-03 DIAGNOSIS — I10 HYPERTENSION GOAL BP (BLOOD PRESSURE) < 140/90: ICD-10-CM

## 2022-05-03 DIAGNOSIS — E78.5 HYPERLIPIDEMIA LDL GOAL <100: ICD-10-CM

## 2022-05-03 DIAGNOSIS — R97.20 ELEVATED PROSTATE SPECIFIC ANTIGEN (PSA): ICD-10-CM

## 2022-05-03 DIAGNOSIS — I77.70 ARTERY DISSECTION (H): ICD-10-CM

## 2022-05-03 DIAGNOSIS — E11.69 TYPE 2 DIABETES MELLITUS WITH OTHER SPECIFIED COMPLICATION, WITH LONG-TERM CURRENT USE OF INSULIN (H): Primary | ICD-10-CM

## 2022-05-03 DIAGNOSIS — Z79.4 TYPE 2 DIABETES MELLITUS WITH OTHER SPECIFIED COMPLICATION, WITH LONG-TERM CURRENT USE OF INSULIN (H): Primary | ICD-10-CM

## 2022-05-03 PROCEDURE — 99606 MTMS BY PHARM EST 15 MIN: CPT | Performed by: PHARMACIST

## 2022-05-03 PROCEDURE — 99607 MTMS BY PHARM ADDL 15 MIN: CPT | Performed by: PHARMACIST

## 2022-05-03 RX ORDER — DULAGLUTIDE 1.5 MG/.5ML
1.5 INJECTION, SOLUTION SUBCUTANEOUS
Qty: 6 ML | Refills: 1 | Status: SHIPPED | OUTPATIENT
Start: 2022-05-03 | End: 2022-08-03

## 2022-05-03 NOTE — PROGRESS NOTES
Medication Therapy Management (MTM) Encounter    ASSESSMENT:                            Medication Adherence/Access: No issues identified    Memory loss: None of the medications seem to be a major contributor to memory loss. With recent dose increase of metoprolol, this may be contributing however memory loss not listed as a significant side effect/adverse effect for this medication, post marketing case reported no % of incidence though. Patient may benefit from discussing in further detail at provider visit and referral to neurology if memory issues do not improve.     Fatigue: Further work-up needed to determine possible causes of fatigue. Poor sleep at night may be contributing, but would recommend obtaining labs to check for other causes (Vitamin D, Vitamin B12, iron/ ferritin). Patient would benefit from resuming multivitamin to ensure adequate vitamin and mineral intake.      Type 2 Diabetes:  Patient is meeting A1c goal of < 7-7.5% and almost meeting fasting BG goals ( mg/dL). As patient would like to avoid insulin, it's appropriate for the patient to continue titration off of Novolog. As the patient is currently at a starting insulin dose (10 units daily), it's recommended for him to halve his Novolog dose for 1 more week (5 units daily) and then stop Novolog completely. To ensure blood sugars and A1c remain at goal after stopping Novolog, it is also recommended to increase his Trulicity dose to 1.5 mg weekly.     HTN/Afib/Lipids/AAA (repair in 2017): Patients blood pressure is not at goal of < 140/90 mmHg. As heart rate remains in range, patient would benefit from dose increase of metoprolol for further blood pressure control. Due to cardiac history and question surrounding DOAC Tx such as Eliquis along with aspirin may be more appropriate vs monotherapy clopidogrel, patient needs to follow-up with [facility] cardiology & vascular team.      BPH: Patients side effect of feeling off balance may be due to  dutasteride. Since patient reports that this medication does not seem to be very effective for him it would be appropriate to trial off of this medication to see if balance issues resolve or if urination improves/remains unchanged.     PLAN:                            BPH:   1. Stop taking dutasteride.      AAA/Afib/HTN/Lipids:   1. Increase metoprolol XL 50 mg twice daily   2. Schedule follow up with cardiology and vascular specialist --> PAL will call to schedule these appointments    Type 2 Diabetes:   1. Decrease Lantus dose to 5 units daily for 1 week, then discontinue Lantus.   2. Increase Trulicity to 1.5 mg once weekly     Acid reflux symptoms:  1. Continue using Tums 2 chews as needed for acid reflux symptoms    Fatigue:   1. May start multivitamin daily. Will recommend that your provider obtain labs at your next follow-up visit to assess fatigue.     Follow-up: 3 months 8/2/2022    SUBJECTIVE/OBJECTIVE:                          Austin Garza is a 81 year old male called for a follow-up visit.  Today's visit is a follow-up [facility] visit from 4/12/22.     Reason for visit: He reports being bothered by a few things - 1) He reports feeling fatigued / tired and lacking motivation to do things, 2) He has noticed some short term memory loss, 3) He is wondering if he should take a multivitamin    Allergies/ADRs: Reviewed in chart  Past Medical History: Reviewed in chart  Tobacco: He reports that he quit smoking about 47 years ago. He has a 10.00 pack-year smoking history. He has quit using smokeless tobacco.  Alcohol: not currently using regularly    Medication Adherence/Access: No issues reported. Managing his medications on his own. Fills at [facility] [address] ([facility]).       Memory loss: He reports that he is experiencing some short memory loss that is worsening and is wondering if it is caused by any of his medications. He reports worsened short-term memory issues such as name recall issues and forgetting  "where places are (Ex. couldn't remember where the plice department was in his home town of [place]rs). He reports has been on going issue for the past year but has worsened over the last few months.     Fatigue: Patient notes some worsening fatigue, he is tired throughout the day and can't find motivation to get up and do things. He reports that he sleeps for ~3 hrs at time, but then wakes up frequently through the night. When he wakes up he usually stays up for awhile (hours). This has been ongoing for a year now.  When discussing possible reasons for fatigue, patient mentioned he was previously on a multivitamin but this was stopped. He's wondering if he can restart this and if it would help.     Lab Results   Component Value Date    B12 884 09/16/2009     No results found for: VITDT  TSH   Date Value Ref Range Status   12/31/2019 3.57 0.40 - 4.00 mU/L Final       Type 2 Diabetes: Currently taking Trulicity 0.75 mg weekly (completed 4 doses so far) and Lantus 10 units daily for past 3 weeks. He is wondering if he can continue to decrease his Lantus dose (does not like daily injections). Reports that Trulicity is going well and he likes once weekly injections.  He notes that sometimes there's an air bubble in the Trulicity pen and he wonders if this is ok to inject. He also notes that he recently dropped the pen on the floor and the air bubble became \"foam\", so he was worried about this and pulled out the Trulicity pen early while giving his dose.   Side effects: No nausea or vomiting, he has had some GERD at night which he takes 2 Tums for & this is effective. Patient has had a history of acid reflux, so not sure if from the medication vs trigger foods.     Blood sugar readings:       Lab Results   Component Value Date    A1C 6.2 02/15/2022    A1C 7.0 07/30/2021    A1C 5.9 04/05/2021    A1C 6.7 01/15/2021    A1C 5.6 08/23/2020    A1C 5.5 07/14/2020    A1C 6.3 12/31/2019       HTN/Afib/Lipids/AAA (repair in 2017):  No " "hypotension concerns. See log above for home blood pressure readings (range 120-150s / 70-80s). He denies any lightheadedness or dizziness. He notes that when he checks his blood pressure in the morning it seems to be higher.   Medications:  - holding Eliquis 5mg twice daily due to concern for bleed, but AFib [id number] > 2.  - metoprolol XL 50 mg in the morning and 25 mg in the evening   - rosuvastatin 20 mg daily   - clopidogrel 75 mg daily for AAA      Per cardiology Dr Ordonez from [facility] note on 12/16/21: \"The patient will contact his primary care, [facility] urology, and outside oncology teams about the ability to resume apixaban for his atrial fibrillation with elevated [id number] score. Factors that would need to be remedied for resumption would be his previous thrombocytopenia, hematuria, and concerns for occult overt gastrointestinal tract bleeding, which is currently being evaluated by his primary care team...the patient will discuss further with his local cardiology services and consider other alternatives to indefinite anticoagulation (e.g., WATCHMAN implantation with shorter, defined course of routine anticoagulation).\"    In the past there has been confusion of patient preference with working with [facility] vs. working with cardiology here. At last visit, patient reported that he prefers to work with cardiology team here. He also needs to follow up with vascular team. He reports he has not established follow-up with either team yet.    BP Readings from Last 3 Encounters:   03/15/22 (!) 156/84   03/10/22 (!) 162/91   02/15/22 126/80     Hemoglobin   Date Value Ref Range Status   03/07/2022 12.7 (L) 13.3 - 17.7 g/dL Final   06/01/2021 13.5 13.3 - 17.7 g/dL Final     WBC   Date Value Ref Range Status   06/01/2021 12.7 (H) 4.0 - 11.0 10e9/L Final     WBC Count   Date Value Ref Range Status   03/07/2022 6.3 4.0 - 11.0 10e3/uL Final     Platelet Count   Date Value Ref Range Status   03/07/2022 66 (L) 150 - 450 " "10e3/uL Final   06/01/2021 119 (L) 150 - 450 10e9/L Final     Recent Labs   Lab Test 02/15/22  0734 04/05/21  0913 11/17/16  0731 10/23/15  0903 08/05/14  0755   CHOL 120 166   < > 109 131   HDL 33* 57   < > 43 47   LDL 58 96   < > 54 63   TRIG 147 64   < > 62 105   CHOLHDLRATIO  --   --   --  2.5 2.8    < > = values in this interval not displayed.        BPH: Currently taking dutasteride 0.5 mg at bedtime. He reports that he started having a dribble issue with start and stop of urination while on this medication, which is ongoing today. His urinary frequency was better when he was off the medication. He reports first going to [facility] in September for hematuria. He urinates a few (2) times during the day. When he wakes up at night, he dies urinate ~3 times / night.   Side effects: Patient reports feeling \"off balance\" when he stands up and turns around.     PSA   Date Value Ref Range Status   12/31/2019 5.49 (H) 0 - 4 ug/L Final     Comment:     Assay Method:  Chemiluminescence using Siemens Vista analyzer       Today's Vitals: There were no vitals taken for this visit.  ----------------    I spent 34 minutes with this patient today. All changes were made via collaborative practice agreement with Vanessa Munson MD. A copy of the visit note was provided to the patient's provider(s).    The patient was sent via [employer] a summary of these recommendations.     Annette Israel, PharmD4 Student    Alayna RobertoD  Medication Therapy Management Pharmacist  Pager#: [phone]      Telemedicine Visit Details  Type of service:  Telephone visit  Start Time: 2:06 pm  End Time: 2:40 pm  Originating Location (patient location): Home  Distant Location (provider location):  [facility]        Medication Therapy Recommendations  Elevated prostate specific antigen (PSA)    Current Medication: dutasteride (AVODART) 0.5 MG capsule (Discontinued)   Rationale: Does not understand instructions - Adherence - Adherence "   Recommendation: Provide Adherence Intervention   Status: Accepted per CPA         Fatigue, unspecified type    Rationale: Untreated condition - Needs additional medication therapy - Indication   Recommendation: Continue to Monitor - VIACTIV MULTI-VITAMIN PO - Address at visit with PCP, will recommend to assess with labs   Status: Accepted - no CPA Needed         Hypertension goal BP (blood pressure) < 140/90    Current Medication: metoprolol tartrate (LOPRESSOR) 50 MG tablet   Rationale: Dose too low - Dosage too low - Effectiveness   Recommendation: Increase Dose - 50 mg twice daily (100 mg total daily dose)   Status: Accepted per CPA         Type 2 diabetes mellitus with other specified complication, with long-term current use of insulin (H)    Current Medication: dulaglutide (TRULICITY) 1.5 MG/0.5ML pen   Rationale: Dose too low - Dosage too low - Effectiveness   Recommendation: Increase Dose - Trulicity 1.5 mg every week (with discontinuation of Lantus insulin)   Status: Accepted per CPA

## 2022-05-03 NOTE — PATIENT INSTRUCTIONS
BPH (prostate/urinary symptoms):   1. Stop taking dutasteride.       Heart medications / Blood pressure:    1. Increase metoprolol XL 50 mg twice daily   2. Schedule follow up with cardiology and vascular specialist --> PAL will call to schedule these appointments     Type 2 Diabetes:   1. Decrease Lantus dose to 5 units daily for 1 week, then discontinue Lantus.   2. Increase Trulicity to 1.5 mg once weekly      Acid reflux symptoms:  1. Continue using Tums 2 chews as needed for acid reflux symptoms     Fatigue:   1. May start multivitamin daily. Will recommend that your provider obtain labs at your next follow-up visit to assess fatigue.      Follow-up: 3 months with [facility]

## 2022-05-04 RX ORDER — DULAGLUTIDE 0.75 MG/.5ML
0.75 INJECTION, SOLUTION SUBCUTANEOUS
Qty: 2 ML | Refills: 0 | OUTPATIENT
Start: 2022-05-04

## 2022-05-04 NOTE — TELEPHONE ENCOUNTER
Patient saw MTM yesterday and increased dose.    dulaglutide (TRULICITY) 1.5 MG/0.5ML pen 6 mL 1 5/3/2022  --   Sig - Route: Inject 1.5 mg Subcutaneous every 7 days - Subcutaneous   Sent to pharmacy as: Trulicity 1.5 MG/0.5ML Subcutaneous Solution Pen-injector (dulaglutide)   Class: E-Prescribe   Order: [id number]   E-Prescribing Status: Receipt confirmed by pharmacy (5/3/2022  2:35 PM CDT)

## 2022-05-09 ENCOUNTER — TELEPHONE (OUTPATIENT)
Dept: PEDIATRICS | Facility: CLINIC | Age: 82
End: 2022-05-09
Payer: COMMERCIAL

## 2022-05-09 NOTE — TELEPHONE ENCOUNTER
Patient is requesting help with setting up follow-up with cardiologist at [facility]. No longer following [facility].

## 2022-05-25 ENCOUNTER — TELEPHONE (OUTPATIENT)
Dept: PEDIATRICS | Facility: CLINIC | Age: 82
End: 2022-05-25
Payer: COMMERCIAL

## 2022-05-25 NOTE — TELEPHONE ENCOUNTER
Reason for call:  Other   Patient called regarding (reason for call): appointment    Additional comments:   There was a recent change to diabetic meds and that is going well. Patient is requesting an appt in June with provider if possible for a diabetes f/u. He apologizes for the no show last week. He never got any reminders on the appt and He and I could not determine why that was.  Please call patient to discuss.    Phone number to reach patient:  Home number on file [phone] (home)    Best Time:  If calling on 5/26 please call after Noon    Can we leave a detailed message on this number?  YES    Travel screening: Not Applicable

## 2022-06-01 ENCOUNTER — TRANSFERRED RECORDS (OUTPATIENT)
Dept: MULTI SPECIALTY CLINIC | Facility: CLINIC | Age: 82
End: 2022-06-01

## 2022-06-01 LAB — RETINOPATHY: NORMAL

## 2022-06-06 ENCOUNTER — LAB (OUTPATIENT)
Dept: ONCOLOGY | Facility: CLINIC | Age: 82
End: 2022-06-06
Attending: INTERNAL MEDICINE
Payer: COMMERCIAL

## 2022-06-06 DIAGNOSIS — C91.10 CLL (CHRONIC LYMPHOCYTIC LEUKEMIA) (H): ICD-10-CM

## 2022-06-06 LAB
ALBUMIN SERPL-MCNC: 3.6 G/DL (ref 3.4–5)
ALP SERPL-CCNC: 67 U/L (ref 40–150)
ALT SERPL W P-5'-P-CCNC: 42 U/L (ref 0–70)
ANION GAP SERPL CALCULATED.3IONS-SCNC: 5 MMOL/L (ref 3–14)
AST SERPL W P-5'-P-CCNC: 22 U/L (ref 0–45)
BASOPHILS # BLD AUTO: 0 10E3/UL (ref 0–0.2)
BASOPHILS NFR BLD AUTO: 0 %
BILIRUB SERPL-MCNC: 0.6 MG/DL (ref 0.2–1.3)
BUN SERPL-MCNC: 24 MG/DL (ref 7–30)
CALCIUM SERPL-MCNC: 9.2 MG/DL (ref 8.5–10.1)
CHLORIDE BLD-SCNC: 105 MMOL/L (ref 94–109)
CO2 SERPL-SCNC: 27 MMOL/L (ref 20–32)
CREAT SERPL-MCNC: 1.5 MG/DL (ref 0.66–1.25)
EOSINOPHIL # BLD AUTO: 0.1 10E3/UL (ref 0–0.7)
EOSINOPHIL NFR BLD AUTO: 1 %
ERYTHROCYTE [DISTWIDTH] IN BLOOD BY AUTOMATED COUNT: 14.4 % (ref 10–15)
GFR SERPL CREATININE-BSD FRML MDRD: 47 ML/MIN/1.73M2
GLUCOSE BLD-MCNC: 223 MG/DL (ref 70–99)
HCT VFR BLD AUTO: 40.1 % (ref 40–53)
HGB BLD-MCNC: 13 G/DL (ref 13.3–17.7)
IMM GRANULOCYTES # BLD: 0 10E3/UL
IMM GRANULOCYTES NFR BLD: 0 %
LDH SERPL L TO P-CCNC: 164 U/L (ref 85–227)
LYMPHOCYTES # BLD AUTO: 3 10E3/UL (ref 0.8–5.3)
LYMPHOCYTES NFR BLD AUTO: 44 %
MCH RBC QN AUTO: 30.9 PG (ref 26.5–33)
MCHC RBC AUTO-ENTMCNC: 32.4 G/DL (ref 31.5–36.5)
MCV RBC AUTO: 95 FL (ref 78–100)
MONOCYTES # BLD AUTO: 0.4 10E3/UL (ref 0–1.3)
MONOCYTES NFR BLD AUTO: 6 %
NEUTROPHILS # BLD AUTO: 3.3 10E3/UL (ref 1.6–8.3)
NEUTROPHILS NFR BLD AUTO: 49 %
NRBC # BLD AUTO: 0 10E3/UL
NRBC BLD AUTO-RTO: 0 /100
PLATELET # BLD AUTO: 65 10E3/UL (ref 150–450)
POTASSIUM BLD-SCNC: 4.3 MMOL/L (ref 3.4–5.3)
PROT SERPL-MCNC: 6.6 G/DL (ref 6.8–8.8)
RBC # BLD AUTO: 4.21 10E6/UL (ref 4.4–5.9)
SODIUM SERPL-SCNC: 137 MMOL/L (ref 133–144)
WBC # BLD AUTO: 6.7 10E3/UL (ref 4–11)

## 2022-06-06 PROCEDURE — 80053 COMPREHEN METABOLIC PANEL: CPT | Performed by: INTERNAL MEDICINE

## 2022-06-06 PROCEDURE — 83615 LACTATE (LD) (LDH) ENZYME: CPT | Performed by: INTERNAL MEDICINE

## 2022-06-06 PROCEDURE — 36415 COLL VENOUS BLD VENIPUNCTURE: CPT

## 2022-06-06 PROCEDURE — 85025 COMPLETE CBC W/AUTO DIFF WBC: CPT | Performed by: INTERNAL MEDICINE

## 2022-06-09 ENCOUNTER — ONCOLOGY VISIT (OUTPATIENT)
Dept: ONCOLOGY | Facility: CLINIC | Age: 82
End: 2022-06-09
Attending: INTERNAL MEDICINE
Payer: COMMERCIAL

## 2022-06-09 VITALS
HEART RATE: 87 BPM | TEMPERATURE: 98.2 F | DIASTOLIC BLOOD PRESSURE: 92 MMHG | WEIGHT: 211 LBS | OXYGEN SATURATION: 98 % | BODY MASS INDEX: 31.16 KG/M2 | SYSTOLIC BLOOD PRESSURE: 142 MMHG | RESPIRATION RATE: 16 BRPM

## 2022-06-09 DIAGNOSIS — C91.10 CLL (CHRONIC LYMPHOCYTIC LEUKEMIA) (H): Primary | ICD-10-CM

## 2022-06-09 PROCEDURE — 99214 OFFICE O/P EST MOD 30 MIN: CPT | Performed by: INTERNAL MEDICINE

## 2022-06-09 PROCEDURE — G0463 HOSPITAL OUTPT CLINIC VISIT: HCPCS

## 2022-06-09 ASSESSMENT — PAIN SCALES - GENERAL: PAINLEVEL: NO PAIN (0)

## 2022-06-09 NOTE — PROGRESS NOTES
[facility]    Hematology/Oncology Established Patient Note      Today's Date: 6/09/22    Reason for Follow-up: CLL      HISTORY OF PRESENT ILLNESS: Austin Garza is a 81 year old male with PMHx of DMII, HTN who presented with leukocytosis.  In November 2017, he was found to have elevated WBC of 61.7K, hemoglobin of 10.4, and platelet of 115K.  There were no other CBC results available between 2010 and 2017.  Prior to 2010, he had normal CBC, with normal to mild anemia, and mild thrombocytopenia.   He was asymptomatic.    He underwent bone marrow biopsy on 11/21/17, which was consistent with chronic lymphocytic leukemia/small lymphocytic lymphoma, with 13% ringed sideroblasts identified.  The presence of increased numbers of ringed sideroblasts raises the possibility of an associated myelodysplastic syndrome.  Dysplastic changes are not identified though.  Many cases exhibiting ringed sideroblasts are metabolic origin, without significant risk of progression to acute leukemia, and these typically do not show dysplastic morphology as is the case with this specimen.  15% ringed sideroblasts are required for a diagnosis for RARS, which this specimen does not meet.  Flow cytometry is also consistent with CLL/SLL.  Cytogenetics show two (10%) of the metaphase cells comprise a clone characterized by a deletion within the proximal long arm of a chromosome 13 as the sole karyotypic abnormality.  Three (15%) metaphases had loss of the Y chromosome as the sole abnormality.    CT scan on 11/29/17 shows mildly enlarged left axillary lymph node and periaortic lymph nodes in the retroperitoneum of the abdomen.  Spleen is also enlarged.    On his staging CT scan for CLL, he was incidentally found to have a large infrarenal abdominal aortic aneurysm, measuring 7.6 cm.  He was seen by Dr. Thomas, and he underwent EVAR on 12/4/17.     He started ibrutinib on 5/4/20.  It was held 5/28/20-6/4/20 for tooth  extraction.    He was hospitalized on 7/30/2021 for shortness of breath, new CHF, A. fib with RVR and ibrutinib was put on hold.  He was seen by cardiology and eventually underwent cardioversion for the A. fib.  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  He was started on Eliquis and metoprolol.  His Plavix was stopped.    He went to the [facility] ED on 9/26/21 and found to have pericardial effusion.  Due to lack of beds, he was transferred to [facility] for acute pericardial effusion and pericarditis.  He was started on colchicine and prednisone taper x 6 weeks.  He was also given Bactrim for PJP prophylaxis due duration of prednisone course.  He was discharged on 10/1/21.        INTERIM HISTORY: Benji feels okay.  He doesn't have his hearing aids today.        REVIEW OF SYSTEMS:   14 point ROS was reviewed and is negative other than as noted above in HPI.       HOME MEDICATIONS:  Current Outpatient Medications   Medication Sig Dispense Refill     bacitracin 500 UNIT/GM OINT Apply 1 Application topically as needed       blood glucose (CONTOUR NEXT TEST) test strip Use to test blood sugar 3-4 times daily or as directed. 100 strip 6     blood glucose monitoring (NO BRAND SPECIFIED) meter device kit Use to test blood sugar 2 times daily or as directed. 1 kit 0     clopidogrel (PLAVIX) 75 MG tablet Take 1 tablet (75 mg) by mouth daily 90 tablet 3     dulaglutide (TRULICITY) 1.5 MG/0.5ML pen Inject 1.5 mg Subcutaneous every 7 days 6 mL 1     insulin glargine (LANTUS PEN) 100 UNIT/ML pen Inject 5 Units Subcutaneous daily For 1 week then off onto Trulicity 1.5mg weekly. 15 mL 0     insulin pen needle (BD SHRUTHI U/F) 32G X 4 MM miscellaneous Use 4 daily as directed. 300 each 5     metoprolol tartrate (LOPRESSOR) 50 MG tablet Take 1 tablet in the morning and 0.5 tablet in the evening 135 tablet 0     rosuvastatin (CRESTOR) 20 MG tablet Take 1 tablet (20 mg) by mouth daily 90 tablet 3          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      CLL (chronic lymphocytic leukemia)      Diaphragmatic hernia      Diverticulitis of colon      Esophageal reflux      Essential hypertension      Hyperlipidemia      Irritable bowel syndrome      Macular degeneration (senile) of retina      Mumps      Squamous Cell Carcinoma, Back      Type 2 diabetes mellitus          PAST SURGICAL HISTORY:  Past Surgical History:   Procedure Laterality Date     ANESTHESIA CARDIOVERSION N/A 2021    Procedure: ANESTHESIA, FOR CARDIOVERSION;  Surgeon: GENERIC ANESTHESIA PROVIDER;  Location: [facility] OR     APPENDECTOMY OPEN  1966     BONE MARROW BIOPSY, BONE SPECIMEN, NEEDLE/TROCAR N/A 2017    Procedure: BIOPSY BONE MARROW;  BONE MARROW BIOPSY;  Surgeon: Shady Garcia MD;  Location:  GI     COLONOSCOPY       ENDOVASCULAR REPAIR ANEURYSM ABDOMINAL AORTA N/A 2017    Procedure: ENDOVASCULAR REPAIR ANEURYSM ABDOMINAL AORTA;  ENDOVASCULAR REPAIR ANEURYSM ABDOMINAL AORTA;  Surgeon: Milton Cazares MD;  Location:  OR     Fistulotomy with marsupialization for repair of fisture in ano       IR ABDOMINAL AORTOGRAM  3/11/2019     IR VISCERAL EMBOLIZATION  2019     Multiple skin tags - resection  1998     Squamous cell skin cancer resection - back       VASECTOMY           SOCIAL HISTORY:  Social History     Socioeconomic History     Marital status:      Spouse name: librado     Number of children: 0     Years of education: 17     Highest education level: Not on file   Occupational History     Occupation: retired   Tobacco Use     Smoking status: Former Smoker     Packs/day: 0.50     Years: 20.00     Pack years: 10.00     Quit date: 1975     Years since quittin.4     Smokeless  tobacco: Former User   Substance and Sexual Activity     Alcohol use: Yes     Alcohol/week: 10.0 - 14.0 standard drinks     Types: 10 - 14 Standard drinks or equivalent per week     Comment: 3 drinks a day/wine     Drug use: No     Sexual activity: Never   Other Topics Concern     Parent/sibling w/ CABG, MI or angioplasty before 65F 55M? Not Asked   Social History Narrative     Not on file     Social Determinants of Health     Financial Resource Strain: Not on file   Food Insecurity: Not on file   Transportation Needs: Not on file   Physical Activity: Not on file   Stress: Not on file   Social Connections: Not on file   Intimate Partner Violence: Not At Risk     Fear of Current or Ex-Partner: No     Emotionally Abused: No     Physically Abused: No     Sexually Abused: No   Housing Stability: Not on file   He quit smoking in .  He drinks 1-3 glasses of wine a night with dinner.  He is retired, and used to work as a .  He lives with his wife in [place].  His cousin had lung cancer and  around age 69.  Otherwise, he denies family history of cancer.        FAMILY HISTORY:  Family History   Problem Relation Age of Onset     Cerebrovascular Disease Father         x 2     Hypertension Mother      C.A.D. Mother      Cancer Mother         skin     Eye Disorder Mother         glaucoma     Prostate Cancer No family hx of      Cancer - colorectal No family hx of      Glaucoma No family hx of      Macular Degeneration No family hx of          PHYSICAL EXAM:  BP (!) 142/92   Pulse 87   Temp 98.2  F (36.8  C) (Tympanic)   Resp 16   Wt 95.7 kg (211 lb)   SpO2 98%   BMI 31.16 kg/m    ECO  GENERAL/CONSTITUTIONAL: No acute distress.  EYES: No scleral icterus.  NEUROLOGIC: Alert, oriented, answers questions appropriately.  INTEGUMENTARY: No jaundice.    GAIT: Steady, does not use assistive device      LABS:  CBC RESULTS: Recent Labs   Lab Test 22  0905   WBC 6.7   RBC 4.21*   HGB 13.0*   HCT 40.1   MCV 95    MCH 30.9   MCHC 32.4   RDW 14.4   PLT 65*     Recent Labs   Lab Test 06/06/22  0905 03/07/22  0959    140   POTASSIUM 4.3 4.4   CHLORIDE 105 107   CO2 27 29   ANIONGAP 5 4   * 191*   BUN 24 28   CR 1.50* 1.45*   MAGDALENO 9.2 9.0     Lab Results   Component Value Date    AST 22 06/06/2022    AST 15 06/01/2021     Lab Results   Component Value Date    ALT 42 06/06/2022    ALT 21 06/01/2021     Lab Results   Component Value Date    BILICONJ 0.0 04/29/2005      Lab Results   Component Value Date    BILITOTAL 0.6 06/06/2022    BILITOTAL 0.7 06/01/2021     Lab Results   Component Value Date    ALBUMIN 3.6 06/06/2022    ALBUMIN 3.6 06/01/2021     Lab Results   Component Value Date    PROTTOTAL 6.6 06/06/2022    PROTTOTAL 7.0 06/01/2021      Lab Results   Component Value Date    ALKPHOS 67 06/06/2022    ALKPHOS 58 06/01/2021     LDH  164        ASSESSMENT/PLAN:  Austin Garza is a 81 year old male with:    1) CLL/SLL: BLACKWOOD stage III, with lymphocytosis, lymphadenopathy, splenomegaly, and anemia.  He has mild thrombocytopenia as well, but is above 100K.  He presented with leukocytosis with WBC of 61.7K, hemoglobin of 10.4, and platelet count of 115K on diagnosis.  Bone marrow biopsy and flow cytometry confirmed CLL/SLL.  CT scan shows mildly enlarged left axillary lymph node and periaortic lymph nodes in the retroperitoneum of the abdomen, with enlarged spleen.      Due to worsening lymphocyte count, anemia, thrombocytopenia, as well as fatigue and night sweats, he started ibrutinib on 5/4/20.      His WBC normalized, with stable hemoglobin and platelet count.    In light of his recent issues with atrial fibrillation and now on Eliquis, his ibrutinib has been on hold since August 2021.  Considering his CLL is under good control, his WBC is actually on the lower end of normal, and with risk of atrial fibrillation and bleeding on anticoagulants, we will continue to hold ibrutinib for now and monitor his counts.  He  will likely need to resume treatment at some point, but we can follow the counts and we could also consider other treatments as well for CLL.      -continue to hold ibrutinib  -he has not had frequent or recent infections  -RTC in 3-4 months for follow-up and labs: CBC/diff, CMP, LDH. He will transfer to a new hematologist after I move.    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 He completed radiation at [facility] [place] with planned 60 Gy x 30 fractions. He has completed radiation and noted that he did not need any more follow-up for this.    3) Abdominal aortic aneurysm: measures up to 7.6 cm on CT scan, incidentally found.  He underwent EVAR on 12/4/17.  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  He is on Plavix now.  On 5/13/19, he underwent and percutaneous aneurysm sac puncture and endo leak embolization by IR on 5/13/19.  -follow-up with vascular surgery and IR    4) Diabetes, hypertension:    -following with PCP    5) Atrial fibrillation with RVR s/p successful DCCV to SR:  -on metoprolol and Eliquis  -following with cardiology    6) CAD:   -follow-up with cardiology    7) Thrombocytopenia: stable.  It had decreased after his [place] hospitalization, may have been from his acute illness and Bactrim.  Platelet count is around his baseline now.  -monitor CBC    8) Recent hospitalization for pericarditis, pericardial effusion at [facility]:  -he has outpatient follow-up with cardiology      Breana Perry MD  Hematology/Oncology  [facility]      Total time spent on day of visit, including review of tests, obtaining/reviewing separately obtained history, ordering medications/tests/procedures, communicating with PCP/consultants, and documenting in electronic medical record: 20 minutes

## 2022-06-09 NOTE — PROGRESS NOTES
"Oncology Rooming Note    June 9, 2022 9:32 AM   Austin Garza is a 81 year old male who presents for:    Chief Complaint   Patient presents with     Oncology Clinic Visit     CLL (chronic lymphocytic leukemia)     Initial Vitals: BP (!) 142/92   Pulse 87   Temp 98.2  F (36.8  C) (Tympanic)   Resp 16   Wt 95.7 kg (211 lb)   SpO2 98%   BMI 31.16 kg/m   Estimated body mass index is 31.16 kg/m  as calculated from the following:    Height as of 3/15/22: 1.753 m (5' 9\").    Weight as of this encounter: 95.7 kg (211 lb). Body surface area is 2.16 meters squared.  No Pain (0) Comment: Data Unavailable   No LMP for male patient.  Allergies reviewed: Yes  Medications reviewed: Yes    Medications: Medication refills not needed today.  Pharmacy name entered into [employer]: [id number] - [place], [place] - [address] AT [address] [facility]      Kimberley Luna CMA              "

## 2022-06-09 NOTE — LETTER
6/9/2022         RE: Austin Garza  [address] [place] [address]        Dear Colleague,    Thank you for referring your patient, Austin Garza, to the [facility]. Please see a copy of my visit note below.    [facility]    Hematology/Oncology Established Patient Note      Today's Date: 6/09/22    Reason for Follow-up: CLL      HISTORY OF PRESENT ILLNESS: Austin Garza is a 81 year old male with PMHx of DMII, HTN who presented with leukocytosis.  In November 2017, he was found to have elevated WBC of 61.7K, hemoglobin of 10.4, and platelet of 115K.  There were no other CBC results available between 2010 and 2017.  Prior to 2010, he had normal CBC, with normal to mild anemia, and mild thrombocytopenia.   He was asymptomatic.    He underwent bone marrow biopsy on 11/21/17, which was consistent with chronic lymphocytic leukemia/small lymphocytic lymphoma, with 13% ringed sideroblasts identified.  The presence of increased numbers of ringed sideroblasts raises the possibility of an associated myelodysplastic syndrome.  Dysplastic changes are not identified though.  Many cases exhibiting ringed sideroblasts are metabolic origin, without significant risk of progression to acute leukemia, and these typically do not show dysplastic morphology as is the case with this specimen.  15% ringed sideroblasts are required for a diagnosis for RARS, which this specimen does not meet.  Flow cytometry is also consistent with CLL/SLL.  Cytogenetics show two (10%) of the metaphase cells comprise a clone characterized by a deletion within the proximal long arm of a chromosome 13 as the sole karyotypic abnormality.  Three (15%) metaphases had loss of the Y chromosome as the sole abnormality.    CT scan on 11/29/17 shows mildly enlarged left axillary lymph node and periaortic lymph nodes in the retroperitoneum of the abdomen.  Spleen is also enlarged.    On his  staging CT scan for CLL, he was incidentally found to have a large infrarenal abdominal aortic aneurysm, measuring 7.6 cm.  He was seen by Dr. Thomas, and he underwent EVAR on 12/4/17.     He started ibrutinib on 5/4/20.  It was held 5/28/20-6/4/20 for tooth extraction.    He was hospitalized on 7/30/2021 for shortness of breath, new CHF, A. fib with RVR and ibrutinib was put on hold.  He was seen by cardiology and eventually underwent cardioversion for the A. fib.  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  He was started on Eliquis and metoprolol.  His Plavix was stopped.    He went to the [facility] ED on 9/26/21 and found to have pericardial effusion.  Due to lack of beds, he was transferred [facility] for acute pericardial effusion and pericarditis.  He was started on colchicine and prednisone taper x 6 weeks.  He was also given Bactrim for PJP prophylaxis due duration of prednisone course.  He was discharged on 10/1/21.        INTERIM HISTORY: Benji feels okay.  He doesn't have his hearing aids today.        REVIEW OF SYSTEMS:   14 point ROS was reviewed and is negative other than as noted above in HPI.       HOME MEDICATIONS:  Current Outpatient Medications   Medication Sig Dispense Refill     bacitracin 500 UNIT/GM OINT Apply 1 Application topically as needed       blood glucose (CONTOUR NEXT TEST) test strip Use to test blood sugar 3-4 times daily or as directed. 100 strip 6     blood glucose monitoring (NO BRAND SPECIFIED) meter device kit Use to test blood sugar 2 times daily or as directed. 1 kit 0     clopidogrel (PLAVIX) 75 MG tablet Take 1 tablet (75 mg) by mouth daily 90 tablet 3     dulaglutide (TRULICITY) 1.5 MG/0.5ML pen Inject 1.5 mg Subcutaneous every 7 days 6 mL 1     insulin glargine (LANTUS PEN) 100 UNIT/ML pen Inject 5 Units Subcutaneous daily For 1 week then off onto Trulicity 1.5mg weekly. 15 mL 0     insulin pen needle (BD SHRUTHI U/F) 32G X 4 MM miscellaneous Use  4 daily as directed. 300 each 5     metoprolol tartrate (LOPRESSOR) 50 MG tablet Take 1 tablet in the morning and 0.5 tablet in the evening 135 tablet 0     rosuvastatin (CRESTOR) 20 MG tablet Take 1 tablet (20 mg) by mouth daily 90 tablet 3         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      CLL (chronic lymphocytic leukemia)      Diaphragmatic hernia      Diverticulitis of colon      Esophageal reflux      Essential hypertension      Hyperlipidemia      Irritable bowel syndrome      Macular degeneration (senile) of retina      Mumps      Squamous Cell Carcinoma, Back 1988     Type 2 diabetes mellitus          PAST SURGICAL HISTORY:  Past Surgical History:   Procedure Laterality Date     ANESTHESIA CARDIOVERSION N/A 8/2/2021    Procedure: ANESTHESIA, FOR CARDIOVERSION;  Surgeon: GENERIC ANESTHESIA PROVIDER;  Location: [facility] OR     APPENDECTOMY OPEN  1966     BONE MARROW BIOPSY, BONE SPECIMEN, NEEDLE/TROCAR N/A 11/21/2017    Procedure: BIOPSY BONE MARROW;  BONE MARROW BIOPSY;  Surgeon: Shady Garcia MD;  Location:  GI     COLONOSCOPY  2002     ENDOVASCULAR REPAIR ANEURYSM ABDOMINAL AORTA N/A 12/4/2017    Procedure: ENDOVASCULAR REPAIR ANEURYSM ABDOMINAL AORTA;  ENDOVASCULAR REPAIR ANEURYSM ABDOMINAL AORTA;  Surgeon: Milton Cazares MD;  Location:  OR     Fistulotomy with marsupialization for repair of fisture in ano  2007     IR ABDOMINAL AORTOGRAM  3/11/2019     IR VISCERAL EMBOLIZATION  5/13/2019     Multiple skin tags - resection  1998     Squamous cell skin cancer resection - back  1985-90     VASECTOMY           SOCIAL HISTORY:  Social History     Socioeconomic History     Marital status:      Spouse name: librado     Number of children: 0     Years of education: 17     Highest education level:  Not on file   Occupational History     Occupation: retired   Tobacco Use     Smoking status: Former Smoker     Packs/day: 0.50     Years: 20.00     Pack years: 10.00     Quit date: 1975     Years since quittin.4     Smokeless tobacco: Former User   Substance and Sexual Activity     Alcohol use: Yes     Alcohol/week: 10.0 - 14.0 standard drinks     Types: 10 - 14 Standard drinks or equivalent per week     Comment: 3 drinks a day/wine     Drug use: No     Sexual activity: Never   Other Topics Concern     Parent/sibling w/ CABG, MI or angioplasty before 65F 55M? Not Asked   Social History Narrative     Not on file     Social Determinants of Health     Financial Resource Strain: Not on file   Food Insecurity: Not on file   Transportation Needs: Not on file   Physical Activity: Not on file   Stress: Not on file   Social Connections: Not on file   Intimate Partner Violence: Not At Risk     Fear of Current or Ex-Partner: No     Emotionally Abused: No     Physically Abused: No     Sexually Abused: No   Housing Stability: Not on file   He quit smoking in .  He drinks 1-3 glasses of wine a night with dinner.  He is retired, and used to work as a .  He lives with his wife in [place].  His cousin had lung cancer and  around age 69.  Otherwise, he denies family history of cancer.        FAMILY HISTORY:  Family History   Problem Relation Age of Onset     Cerebrovascular Disease Father         x 2     Hypertension Mother      C.A.D. Mother      Cancer Mother         skin     Eye Disorder Mother         glaucoma     Prostate Cancer No family hx of      Cancer - colorectal No family hx of      Glaucoma No family hx of      Macular Degeneration No family hx of          PHYSICAL EXAM:  BP (!) 142/92   Pulse 87   Temp 98.2  F (36.8  C) (Tympanic)   Resp 16   Wt 95.7 kg (211 lb)   SpO2 98%   BMI 31.16 kg/m    ECO  GENERAL/CONSTITUTIONAL: No acute distress.  EYES: No scleral icterus.  NEUROLOGIC: Alert,  oriented, answers questions appropriately.  INTEGUMENTARY: No jaundice.    GAIT: Steady, does not use assistive device      LABS:  CBC RESULTS: Recent Labs   Lab Test 06/06/22  0905   WBC 6.7   RBC 4.21*   HGB 13.0*   HCT 40.1   MCV 95   MCH 30.9   MCHC 32.4   RDW 14.4   PLT 65*     Recent Labs   Lab Test 06/06/22  0905 03/07/22  0959    140   POTASSIUM 4.3 4.4   CHLORIDE 105 107   CO2 27 29   ANIONGAP 5 4   * 191*   BUN 24 28   CR 1.50* 1.45*   MAGDALENO 9.2 9.0     Lab Results   Component Value Date    AST 22 06/06/2022    AST 15 06/01/2021     Lab Results   Component Value Date    ALT 42 06/06/2022    ALT 21 06/01/2021     Lab Results   Component Value Date    BILICONJ 0.0 04/29/2005      Lab Results   Component Value Date    BILITOTAL 0.6 06/06/2022    BILITOTAL 0.7 06/01/2021     Lab Results   Component Value Date    ALBUMIN 3.6 06/06/2022    ALBUMIN 3.6 06/01/2021     Lab Results   Component Value Date    PROTTOTAL 6.6 06/06/2022    PROTTOTAL 7.0 06/01/2021      Lab Results   Component Value Date    ALKPHOS 67 06/06/2022    ALKPHOS 58 06/01/2021     LDH  164        ASSESSMENT/PLAN:  Austin Garza is a 81 year old male with:    1) CLL/SLL: BLACKWOOD stage III, with lymphocytosis, lymphadenopathy, splenomegaly, and anemia.  He has mild thrombocytopenia as well, but is above 100K.  He presented with leukocytosis with WBC of 61.7K, hemoglobin of 10.4, and platelet count of 115K on diagnosis.  Bone marrow biopsy and flow cytometry confirmed CLL/SLL.  CT scan shows mildly enlarged left axillary lymph node and periaortic lymph nodes in the retroperitoneum of the abdomen, with enlarged spleen.      Due to worsening lymphocyte count, anemia, thrombocytopenia, as well as fatigue and night sweats, he started ibrutinib on 5/4/20.      His WBC normalized, with stable hemoglobin and platelet count.    In light of his recent issues with atrial fibrillation and now on Eliquis, his ibrutinib has been on hold since August  2021.  Considering his CLL is under good control, his WBC is actually on the lower end of normal, and with risk of atrial fibrillation and bleeding on anticoagulants, we will continue to hold ibrutinib for now and monitor his counts.  He will likely need to resume treatment at some point, but we can follow the counts and we could also consider other treatments as well for CLL.      -continue to hold ibrutinib  -he has not had frequent or recent infections  -RTC in 3-4 months for follow-up and labs: CBC/diff, CMP, LDH. He will transfer to a new hematologist after I move.    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 He completed radiation at [facility] with planned 60 Gy x 30 fractions. He has completed radiation and noted that he did not need any more follow-up for this.    3) Abdominal aortic aneurysm: measures up to 7.6 cm on CT scan, incidentally found.  He underwent EVAR on 12/4/17.  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  He is on Plavix now.  On 5/13/19, he underwent and percutaneous aneurysm sac puncture and endo leak embolization by IR on 5/13/19.  -follow-up with vascular surgery and IR    4) Diabetes, hypertension:    -following with PCP    5) Atrial fibrillation with RVR s/p successful DCCV to SR:  -on metoprolol and Eliquis  -following with cardiology    6) CAD:   -follow-up with cardiology    7) Thrombocytopenia: stable.  It had decreased after his [facility] hospitalization, may have been from his acute illness and Bactrim.  Platelet count is around his baseline now.  -monitor CBC    8) Recent hospitalization for pericarditis, pericardial effusion at [facility]:  -he has outpatient follow-up with cardiology      Breana Perry MD  Hematology/Oncology  [facility]      Total time spent on day of visit, including review of tests, obtaining/reviewing separately obtained history,  "ordering medications/tests/procedures, communicating with PCP/consultants, and documenting in electronic medical record: 20 minutes    Oncology Rooming Note    June 9, 2022 9:32 AM   Austin Garza is a 81 year old male who presents for:    Chief Complaint   Patient presents with     Oncology Clinic Visit     CLL (chronic lymphocytic leukemia)     Initial Vitals: BP (!) 142/92   Pulse 87   Temp 98.2  F (36.8  C) (Tympanic)   Resp 16   Wt 95.7 kg (211 lb)   SpO2 98%   BMI 31.16 kg/m   Estimated body mass index is 31.16 kg/m  as calculated from the following:    Height as of 3/15/22: 1.753 m (5' 9\").    Weight as of this encounter: 95.7 kg (211 lb). Body surface area is 2.16 meters squared.  No Pain (0) Comment: Data Unavailable   No LMP for male patient.  Allergies reviewed: Yes  Medications reviewed: Yes    Medications: Medication refills not needed today.  Pharmacy name entered into [employer]: [id number] - [place], [place] - [address] AT [facility]      Kimberley Luna CMA                  Again, thank you for allowing me to participate in the care of your patient.        Sincerely,        Breana Perry MD    "

## 2022-06-15 ENCOUNTER — OFFICE VISIT (OUTPATIENT)
Dept: CARDIOLOGY | Facility: CLINIC | Age: 82
End: 2022-06-15
Payer: COMMERCIAL

## 2022-06-15 VITALS
OXYGEN SATURATION: 99 % | HEIGHT: 69 IN | SYSTOLIC BLOOD PRESSURE: 158 MMHG | HEART RATE: 88 BPM | DIASTOLIC BLOOD PRESSURE: 70 MMHG | WEIGHT: 211.3 LBS | BODY MASS INDEX: 31.29 KG/M2

## 2022-06-15 DIAGNOSIS — I48.91 ATRIAL FIBRILLATION, UNSPECIFIED TYPE (H): ICD-10-CM

## 2022-06-15 DIAGNOSIS — Z13.6 SCREENING FOR HEART DISEASE: Primary | ICD-10-CM

## 2022-06-15 DIAGNOSIS — I42.9 CARDIOMYOPATHY, UNSPECIFIED TYPE (H): ICD-10-CM

## 2022-06-15 DIAGNOSIS — I25.10 CORONARY ARTERY DISEASE INVOLVING NATIVE CORONARY ARTERY OF NATIVE HEART WITHOUT ANGINA PECTORIS: ICD-10-CM

## 2022-06-15 PROCEDURE — 99215 OFFICE O/P EST HI 40 MIN: CPT | Mod: 25 | Performed by: INTERNAL MEDICINE

## 2022-06-15 PROCEDURE — 93000 ELECTROCARDIOGRAM COMPLETE: CPT | Performed by: INTERNAL MEDICINE

## 2022-06-15 NOTE — LETTER
6/15/2022    Vanessa Munson MD  [address] [facility] [address]    RE: Austin Garza       Dear Colleague,     I had the pleasure of seeing Austin Garza in the [facility].  HISTORY:    Austin Garza is a pleasant 81-year-old male with a past medical history of AAA status post endovascular repair, hypertension, type 2 diabetes, CLL on Plavix and ibrutinib, paroxysmal atrial fibrillation resulting in CHEYANNE and cardioversion in August 2021, cardiomyopathy of uncertain etiology with ejection fraction 45 to 50% thought secondary to tachycardia.    Transthoracic echo as demonstrated inferior hypokinesis and a nuclear stress test done in August was reported as probably abnormal with a nontransmural apical and inferior infarct and mild arnoldo-infarction ischemia.  At that time coronary angiography was recommended but due to a communication failure this was not arranged.    Last fall Benji presented with flank pain and hematuria and due to lack of bed availability in the [facility] he was transferred to [facility].  There he had an echocardiogram showing a small to moderate cardial effusion and he was started on colchicine.  A follow-up echo done 3 months later showed resolution of his pericardial effusion.  Those echoes from [facility] are reviewed today.  Of note, the [place] echo showed recovery of his EF back to normal.  Also of note patient had a chest CT in July showing severe coronary calcification.    Today Benji reports that he used to feel extremely limited by easy fatigability.  He continues to go to the [facility] 2 or 3 days a week and exercises on an exercise bike going about 5 miles in 30 minutes.  1 year ago when he did this he was started on level 8 he currently sets it on level 1 because if he has any resistance he becomes very fatigued.  However, he denies exertional dyspnea.  He has not not had any exertional chest arm neck shoulder or jaw discomfort and he denies any sense  of palpitations.  He reports that he generally is extremely inactive which is new for him.    ECG today is reviewed and shows sinus rhythm.      ASSESSMENT/PLAN:    1.  Paroxysmal atrial fibrillation.  Patient continues in sinus rhythm at this time.  He is using Plavix but not anticoagulation because of his bleeding risk.  2.  Coronary artery disease.  The patient has documented heavy coronary calcification and a positive stress test.  He complains of life limiting fatigue but not dyspnea.  He has a positive nuclear stress test.  In the past a coronary angiogram was recommended to evaluate for coronary disease as a possible cause of his symptoms.  I had a long discussion with him once again about the possibility.  He is willing to do anything to try to feel better since his fatigue is quite limiting for him.  I told him that while this is not very likely to be due to coronary disease it is a possibility, and the only way we will know is to check.  He is interested in pursuing this so I will arrange a coronary angiogram.  Risks and benefits of coronary disease were discussed in detail.  He would best be approach via radial artery because of his AAA stent.  3.  CLL using ibrutinib, a likely attributed to his atrial fib.  4.  AAA with previous EVAR  5.  Type 2 diabetes  6.  CKD mild  7.  Hypertension.  Blood pressure elevated in clinic today and was not addressed.  He had been started on amlodipine in the past and I did not realize it had been stopped.  I am not certain why this was stopped or who stopped it but it probably should be restarted at the time of his upcoming hospital stay for angiography.    Thank you for inviting me to participate in the care of your patient.  Please don't hesitate to call if I can be of further assistance.  45 minutes were spent today reviewing the chart and other records, interviewing and examining the patient, and documenting our visit.    Chart documentation was completed, in part,  with Dragon voice-recognition software. Even though reviewed, some grammatical, spelling, and word errors may remain.       Orders Placed This Encounter   Procedures     EKG 12-lead complete w/read - Clinics     Case Request Cath Lab: Coronary Angiogram     No orders of the defined types were placed in this encounter.    There are no discontinued medications.    10 year ASCVD risk: The ASCVD Risk score ([place] MILIAN Jr., et al., 2013) failed to calculate for the following reasons:    The valid total cholesterol range is 130 to 320 mg/dL    Encounter Diagnoses   Name Primary?     Screening for heart disease Yes     Atrial fibrillation, unspecified type (H)      Cardiomyopathy, unspecified type (H)      Coronary artery disease involving native coronary artery of native heart without angina pectoris        CURRENT MEDICATIONS:  Current Outpatient Medications   Medication Sig Dispense Refill     bacitracin 500 UNIT/GM OINT Apply 1 Application topically as needed       blood glucose (CONTOUR NEXT TEST) test strip Use to test blood sugar 3-4 times daily or as directed. 100 strip 6     blood glucose monitoring (NO BRAND SPECIFIED) meter device kit Use to test blood sugar 2 times daily or as directed. 1 kit 0     clopidogrel (PLAVIX) 75 MG tablet Take 1 tablet (75 mg) by mouth daily 90 tablet 3     dulaglutide (TRULICITY) 1.5 MG/0.5ML pen Inject 1.5 mg Subcutaneous every 7 days 6 mL 1     insulin glargine (LANTUS PEN) 100 UNIT/ML pen Inject 5 Units Subcutaneous daily For 1 week then off onto Trulicity 1.5mg weekly. 15 mL 0     insulin pen needle (BD SHRUTHI U/F) 32G X 4 MM miscellaneous Use 4 daily as directed. 300 each 5     metoprolol tartrate (LOPRESSOR) 50 MG tablet Take 1 tablet in the morning and 0.5 tablet in the evening 135 tablet 0     rosuvastatin (CRESTOR) 20 MG tablet Take 1 tablet (20 mg) by mouth daily 90 tablet 3       ALLERGIES     Allergies   Allergen Reactions     Ace Inhibitors Cough     cough  cough       PAST  MEDICAL HISTORY:  Past Medical History:   Diagnosis Date     AAA (abdominal aortic aneurysm)      BCC (basal cell carcinoma of skin) - face      CLL (chronic lymphocytic leukemia)      Diaphragmatic hernia      Diverticulitis of colon      Esophageal reflux      Essential hypertension      Hyperlipidemia      Irritable bowel syndrome      Macular degeneration (senile) of retina      Mumps      Squamous Cell Carcinoma, Back 1988     Type 2 diabetes mellitus        PAST SURGICAL HISTORY:  Past Surgical History:   Procedure Laterality Date     ANESTHESIA CARDIOVERSION N/A 8/2/2021    Procedure: ANESTHESIA, FOR CARDIOVERSION;  Surgeon: GENERIC ANESTHESIA PROVIDER;  Location: [facility] OR     APPENDECTOMY OPEN  1966     BONE MARROW BIOPSY, BONE SPECIMEN, NEEDLE/TROCAR N/A 11/21/2017    Procedure: BIOPSY BONE MARROW;  BONE MARROW BIOPSY;  Surgeon: Shady Garcia MD;  Location:  GI     COLONOSCOPY  2002     ENDOVASCULAR REPAIR ANEURYSM ABDOMINAL AORTA N/A 12/4/2017    Procedure: ENDOVASCULAR REPAIR ANEURYSM ABDOMINAL AORTA;  ENDOVASCULAR REPAIR ANEURYSM ABDOMINAL AORTA;  Surgeon: Milton Cazares MD;  Location:  OR     Fistulotomy with marsupialization for repair of fisture in ano  2007     IR ABDOMINAL AORTOGRAM  3/11/2019     IR VISCERAL EMBOLIZATION  5/13/2019     Multiple skin tags - resection  1998     Squamous cell skin cancer resection - back  1985-90     VASECTOMY         FAMILY HISTORY:  Family History   Problem Relation Age of Onset     Cerebrovascular Disease Father         x 2     Hypertension Mother      C.A.D. Mother      Cancer Mother         skin     Eye Disorder Mother         glaucoma     Prostate Cancer No family hx of      Cancer - colorectal No family hx of      Glaucoma No family hx of      Macular Degeneration No family hx of        SOCIAL HISTORY:  Social History     Socioeconomic History     Marital status:      Spouse name: librado     Number of children: 0     Years of education:  "17     Highest education level: None   Occupational History     Occupation: retired   Tobacco Use     Smoking status: Former Smoker     Packs/day: 0.50     Years: 20.00     Pack years: 10.00     Quit date: 1975     Years since quittin.4     Smokeless tobacco: Former User   Substance and Sexual Activity     Alcohol use: Yes     Alcohol/week: 10.0 - 14.0 standard drinks     Types: 10 - 14 Standard drinks or equivalent per week     Comment: 3 drinks a day/wine     Drug use: No     Sexual activity: Never     Social Determinants of Health     Intimate Partner Violence: Not At Risk     Fear of Current or Ex-Partner: No     Emotionally Abused: No     Physically Abused: No     Sexually Abused: No       Review of Systems:  Skin:  Negative     Eyes:  Negative    ENT:  Negative hearing loss  Respiratory:  Positive for shortness of breath  Cardiovascular:    chest pain;Positive for;fatigue  Gastroenterology: Positive for poor appetite  Genitourinary:  Positive for urinary frequency  Musculoskeletal:  Positive for back pain  Neurologic:  Positive for numbness or tingling of feet;headaches  Psychiatric:  Positive for sleep disturbances  Heme/Lymph/Imm:  Positive for weight loss  Endocrine:  Positive for diabetes    Physical Exam:  Vitals: BP (!) 158/70   Pulse 88   Ht 1.753 m (5' 9\")   Wt 95.8 kg (211 lb 4.8 oz)   SpO2 99%   BMI 31.20 kg/m      Constitutional:  cooperative, alert and oriented, well developed, well nourished, in no acute distress obese      Skin:  warm and dry to the touch, no apparent skin lesions or masses noted        Head:  normocephalic, no masses or lesions        Eyes:  sclera white;no xanthalasma        ENT:  no pallor or cyanosis   masked    Neck:  carotid pulses are full and equal bilaterally, JVP normal, no carotid bruit        Chest:  normal breath sounds, clear to auscultation, normal A-P diameter, normal symmetry, normal respiratory excursion, no use of accessory muscles        Cardiac: " regular rhythm;normal S1 and S2;no S3 or S4       systolic murmur;grade 1          Abdomen:  abdomen soft;BS normoactive        Vascular: pulses full and equal                                      Extremities and Muscular Skeletal:        bilateral LE edema;trace     Neurological:  no gross motor deficits        Psych:  affect appropriate, oriented to time, person and place     Recent Lab Results:  LIPID RESULTS:  Lab Results   Component Value Date    CHOL 120 02/15/2022    CHOL 166 04/05/2021    HDL 33 (L) 02/15/2022    HDL 57 04/05/2021    LDL 58 02/15/2022    LDL 96 04/05/2021    TRIG 147 02/15/2022    TRIG 64 04/05/2021    CHOLHDLRATIO 2.5 10/23/2015       LIVER ENZYME RESULTS:  Lab Results   Component Value Date    AST 22 06/06/2022    AST 15 06/01/2021    ALT 42 06/06/2022    ALT 21 06/01/2021       CBC RESULTS:  Lab Results   Component Value Date    WBC 6.7 06/06/2022    WBC 12.7 (H) 06/01/2021    RBC 4.21 (L) 06/06/2022    RBC 4.22 (L) 06/01/2021    HGB 13.0 (L) 06/06/2022    HGB 13.5 06/01/2021    HCT 40.1 06/06/2022    HCT 41.3 06/01/2021    MCV 95 06/06/2022    MCV 98 06/01/2021    MCH 30.9 06/06/2022    MCH 32.0 06/01/2021    MCHC 32.4 06/06/2022    MCHC 32.7 06/01/2021    RDW 14.4 06/06/2022    RDW 13.0 06/01/2021    PLT 65 (L) 06/06/2022     (L) 06/01/2021       BMP RESULTS:  Lab Results   Component Value Date     06/06/2022     06/01/2021    POTASSIUM 4.3 06/06/2022    POTASSIUM 4.2 06/01/2021    CHLORIDE 105 06/06/2022    CHLORIDE 104 06/01/2021    CO2 27 06/06/2022    CO2 30 06/01/2021    ANIONGAP 5 06/06/2022    ANIONGAP 2 (L) 06/01/2021     (H) 06/06/2022     (H) 06/01/2021    BUN 24 06/06/2022    BUN 26 06/01/2021    CR 1.50 (H) 06/06/2022    CR 1.51 (H) 06/01/2021    GFRESTIMATED 47 (L) 06/06/2022    GFRESTIMATED 43 (L) 06/01/2021    GFRESTBLACK 50 (L) 06/01/2021    MAGDALENO 9.2 06/06/2022    MAGDALENO 8.9 06/01/2021        A1C RESULTS:  Lab Results   Component Value Date     A1C 6.2 (H) 02/15/2022    A1C 5.9 (H) 04/05/2021       INR RESULTS:  Lab Results   Component Value Date    INR 1.24 (H) 08/02/2021    INR 1.08 05/13/2019    INR 1.03 03/11/2019         Juwan Bah MD, [facility]    CC  No referring provider defined for this encounter.    Thank you for allowing me to participate in the care of your patient.      Sincerely,     Juwan Bah MD     [facility]

## 2022-06-15 NOTE — PROGRESS NOTES
HISTORY:    Austin Garza is a pleasant 81-year-old male with a past medical history of AAA status post endovascular repair, hypertension, type 2 diabetes, CLL on Plavix and ibrutinib, paroxysmal atrial fibrillation resulting in CHEYANNE and cardioversion in August 2021, cardiomyopathy of uncertain etiology with ejection fraction 45 to 50% thought secondary to tachycardia.    Transthoracic echo as demonstrated inferior hypokinesis and a nuclear stress test done in August was reported as probably abnormal with a nontransmural apical and inferior infarct and mild arnoldo-infarction ischemia.  At that time coronary angiography was recommended but due to a communication failure this was not arranged.    Last fall Benji presented with flank pain and hematuria and due to lack of bed availability in the [facility] he was transferred to [facility].  There he had an echocardiogram showing a small to moderate cardial effusion and he was started on colchicine.  A follow-up echo done 3 months later showed resolution of his pericardial effusion.  Those echoes from [facility] are reviewed today.  Of note, the [place] echo showed recovery of his EF back to normal.  Also of note patient had a chest CT in July showing severe coronary calcification.    Today Benji reports that he used to feel extremely limited by easy fatigability.  He continues to go to the [facility] 2 or 3 days a week and exercises on an exercise bike going about 5 miles in 30 minutes.  1 year ago when he did this he was started on level 8 he currently sets it on level 1 because if he has any resistance he becomes very fatigued.  However, he denies exertional dyspnea.  He has not not had any exertional chest arm neck shoulder or jaw discomfort and he denies any sense of palpitations.  He reports that he generally is extremely inactive which is new for him.    ECG today is reviewed and shows sinus rhythm.      ASSESSMENT/PLAN:    1.  Paroxysmal atrial fibrillation.  Patient  continues in sinus rhythm at this time.  He is using Plavix but not anticoagulation because of his bleeding risk.  2.  Coronary artery disease.  The patient has documented heavy coronary calcification and a positive stress test.  He complains of life limiting fatigue but not dyspnea.  He has a positive nuclear stress test.  In the past a coronary angiogram was recommended to evaluate for coronary disease as a possible cause of his symptoms.  I had a long discussion with him once again about the possibility.  He is willing to do anything to try to feel better since his fatigue is quite limiting for him.  I told him that while this is not very likely to be due to coronary disease it is a possibility, and the only way we will know is to check.  He is interested in pursuing this so I will arrange a coronary angiogram.  Risks and benefits of coronary disease were discussed in detail.  He would best be approach via radial artery because of his AAA stent.  3.  CLL using ibrutinib, a likely attributed to his atrial fib.  4.  AAA with previous EVAR  5.  Type 2 diabetes  6.  CKD mild  7.  Hypertension.  Blood pressure elevated in clinic today and was not addressed.  He had been started on amlodipine in the past and I did not realize it had been stopped.  I am not certain why this was stopped or who stopped it but it probably should be restarted at the time of his upcoming hospital stay for angiography.    Thank you for inviting me to participate in the care of your patient.  Please don't hesitate to call if I can be of further assistance.  45 minutes were spent today reviewing the chart and other records, interviewing and examining the patient, and documenting our visit.    Chart documentation was completed, in part, with [employer] voice-recognition software. Even though reviewed, some grammatical, spelling, and word errors may remain.       Orders Placed This Encounter   Procedures     EKG 12-lead complete w/read - Clinics      Case Request Cath Lab: Coronary Angiogram     No orders of the defined types were placed in this encounter.    There are no discontinued medications.    10 year ASCVD risk: The ASCVD Risk score (Thong RUKHSANA Jr., et al., 2013) failed to calculate for the following reasons:    The valid total cholesterol range is 130 to 320 mg/dL    Encounter Diagnoses   Name Primary?     Screening for heart disease Yes     Atrial fibrillation, unspecified type (H)      Cardiomyopathy, unspecified type (H)      Coronary artery disease involving native coronary artery of native heart without angina pectoris        CURRENT MEDICATIONS:  Current Outpatient Medications   Medication Sig Dispense Refill     bacitracin 500 UNIT/GM OINT Apply 1 Application topically as needed       blood glucose (CONTOUR NEXT TEST) test strip Use to test blood sugar 3-4 times daily or as directed. 100 strip 6     blood glucose monitoring (NO BRAND SPECIFIED) meter device kit Use to test blood sugar 2 times daily or as directed. 1 kit 0     clopidogrel (PLAVIX) 75 MG tablet Take 1 tablet (75 mg) by mouth daily 90 tablet 3     dulaglutide (TRULICITY) 1.5 MG/0.5ML pen Inject 1.5 mg Subcutaneous every 7 days 6 mL 1     insulin glargine (LANTUS PEN) 100 UNIT/ML pen Inject 5 Units Subcutaneous daily For 1 week then off onto Trulicity 1.5mg weekly. 15 mL 0     insulin pen needle (BD SHRUTHI U/F) 32G X 4 MM miscellaneous Use 4 daily as directed. 300 each 5     metoprolol tartrate (LOPRESSOR) 50 MG tablet Take 1 tablet in the morning and 0.5 tablet in the evening 135 tablet 0     rosuvastatin (CRESTOR) 20 MG tablet Take 1 tablet (20 mg) by mouth daily 90 tablet 3       ALLERGIES     Allergies   Allergen Reactions     Ace Inhibitors Cough     cough  cough       PAST MEDICAL HISTORY:  Past Medical History:   Diagnosis Date     AAA (abdominal aortic aneurysm)      BCC (basal cell carcinoma of skin) - face      CLL (chronic lymphocytic leukemia)      Diaphragmatic hernia       Diverticulitis of colon      Esophageal reflux      Essential hypertension      Hyperlipidemia      Irritable bowel syndrome      Macular degeneration (senile) of retina      Mumps      Squamous Cell Carcinoma, Back 1988     Type 2 diabetes mellitus        PAST SURGICAL HISTORY:  Past Surgical History:   Procedure Laterality Date     ANESTHESIA CARDIOVERSION N/A 8/2/2021    Procedure: ANESTHESIA, FOR CARDIOVERSION;  Surgeon: GENERIC ANESTHESIA PROVIDER;  Location: [facility] OR     APPENDECTOMY OPEN  1966     BONE MARROW BIOPSY, BONE SPECIMEN, NEEDLE/TROCAR N/A 11/21/2017    Procedure: BIOPSY BONE MARROW;  BONE MARROW BIOPSY;  Surgeon: Shady Garcia MD;  Location:  GI     COLONOSCOPY  2002     ENDOVASCULAR REPAIR ANEURYSM ABDOMINAL AORTA N/A 12/4/2017    Procedure: ENDOVASCULAR REPAIR ANEURYSM ABDOMINAL AORTA;  ENDOVASCULAR REPAIR ANEURYSM ABDOMINAL AORTA;  Surgeon: Milton Cazares MD;  Location:  OR     Fistulotomy with marsupialization for repair of fisture in ano  2007     IR ABDOMINAL AORTOGRAM  3/11/2019     IR VISCERAL EMBOLIZATION  5/13/2019     Multiple skin tags - resection  1998     Squamous cell skin cancer resection - back  1985-90     VASECTOMY         FAMILY HISTORY:  Family History   Problem Relation Age of Onset     Cerebrovascular Disease Father         x 2     Hypertension Mother      C.A.D. Mother      Cancer Mother         skin     Eye Disorder Mother         glaucoma     Prostate Cancer No family hx of      Cancer - colorectal No family hx of      Glaucoma No family hx of      Macular Degeneration No family hx of        SOCIAL HISTORY:  Social History     Socioeconomic History     Marital status:      Spouse name: librado     Number of children: 0     Years of education: 17     Highest education level: None   Occupational History     Occupation: retired   Tobacco Use     Smoking status: Former Smoker     Packs/day: 0.50     Years: 20.00     Pack years: 10.00     Quit date:  "1975     Years since quittin.4     Smokeless tobacco: Former User   Substance and Sexual Activity     Alcohol use: Yes     Alcohol/week: 10.0 - 14.0 standard drinks     Types: 10 - 14 Standard drinks or equivalent per week     Comment: 3 drinks a day/wine     Drug use: No     Sexual activity: Never     Social Determinants of Health     Intimate Partner Violence: Not At Risk     Fear of Current or Ex-Partner: No     Emotionally Abused: No     Physically Abused: No     Sexually Abused: No       Review of Systems:  Skin:  Negative     Eyes:  Negative    ENT:  Negative hearing loss  Respiratory:  Positive for shortness of breath  Cardiovascular:    chest pain;Positive for;fatigue  Gastroenterology: Positive for poor appetite  Genitourinary:  Positive for urinary frequency  Musculoskeletal:  Positive for back pain  Neurologic:  Positive for numbness or tingling of feet;headaches  Psychiatric:  Positive for sleep disturbances  Heme/Lymph/Imm:  Positive for weight loss  Endocrine:  Positive for diabetes    Physical Exam:  Vitals: BP (!) 158/70   Pulse 88   Ht 1.753 m (5' 9\")   Wt 95.8 kg (211 lb 4.8 oz)   SpO2 99%   BMI 31.20 kg/m      Constitutional:  cooperative, alert and oriented, well developed, well nourished, in no acute distress obese      Skin:  warm and dry to the touch, no apparent skin lesions or masses noted        Head:  normocephalic, no masses or lesions        Eyes:  sclera white;no xanthalasma        ENT:  no pallor or cyanosis   masked    Neck:  carotid pulses are full and equal bilaterally, JVP normal, no carotid bruit        Chest:  normal breath sounds, clear to auscultation, normal A-P diameter, normal symmetry, normal respiratory excursion, no use of accessory muscles        Cardiac: regular rhythm;normal S1 and S2;no S3 or S4       systolic murmur;grade 1          Abdomen:  abdomen soft;BS normoactive        Vascular: pulses full and equal                                  "     Extremities and Muscular Skeletal:        bilateral LE edema;trace     Neurological:  no gross motor deficits        Psych:  affect appropriate, oriented to time, person and place     Recent Lab Results:  LIPID RESULTS:  Lab Results   Component Value Date    CHOL 120 02/15/2022    CHOL 166 04/05/2021    HDL 33 (L) 02/15/2022    HDL 57 04/05/2021    LDL 58 02/15/2022    LDL 96 04/05/2021    TRIG 147 02/15/2022    TRIG 64 04/05/2021    CHOLHDLRATIO 2.5 10/23/2015       LIVER ENZYME RESULTS:  Lab Results   Component Value Date    AST 22 06/06/2022    AST 15 06/01/2021    ALT 42 06/06/2022    ALT 21 06/01/2021       CBC RESULTS:  Lab Results   Component Value Date    WBC 6.7 06/06/2022    WBC 12.7 (H) 06/01/2021    RBC 4.21 (L) 06/06/2022    RBC 4.22 (L) 06/01/2021    HGB 13.0 (L) 06/06/2022    HGB 13.5 06/01/2021    HCT 40.1 06/06/2022    HCT 41.3 06/01/2021    MCV 95 06/06/2022    MCV 98 06/01/2021    MCH 30.9 06/06/2022    MCH 32.0 06/01/2021    MCHC 32.4 06/06/2022    MCHC 32.7 06/01/2021    RDW 14.4 06/06/2022    RDW 13.0 06/01/2021    PLT 65 (L) 06/06/2022     (L) 06/01/2021       BMP RESULTS:  Lab Results   Component Value Date     06/06/2022     06/01/2021    POTASSIUM 4.3 06/06/2022    POTASSIUM 4.2 06/01/2021    CHLORIDE 105 06/06/2022    CHLORIDE 104 06/01/2021    CO2 27 06/06/2022    CO2 30 06/01/2021    ANIONGAP 5 06/06/2022    ANIONGAP 2 (L) 06/01/2021     (H) 06/06/2022     (H) 06/01/2021    BUN 24 06/06/2022    BUN 26 06/01/2021    CR 1.50 (H) 06/06/2022    CR 1.51 (H) 06/01/2021    GFRESTIMATED 47 (L) 06/06/2022    GFRESTIMATED 43 (L) 06/01/2021    GFRESTBLACK 50 (L) 06/01/2021    MAGDALENO 9.2 06/06/2022    MAGDALENO 8.9 06/01/2021        A1C RESULTS:  Lab Results   Component Value Date    A1C 6.2 (H) 02/15/2022    A1C 5.9 (H) 04/05/2021       INR RESULTS:  Lab Results   Component Value Date    INR 1.24 (H) 08/02/2021    INR 1.08 05/13/2019    INR 1.03 03/11/2019          Juwan Bah MD, [facility]    CC  No referring provider defined for this encounter.

## 2022-06-17 DIAGNOSIS — I25.10 CORONARY ARTERY DISEASE INVOLVING NATIVE CORONARY ARTERY OF NATIVE HEART WITHOUT ANGINA PECTORIS: Primary | ICD-10-CM

## 2022-06-22 ENCOUNTER — OFFICE VISIT (OUTPATIENT)
Dept: PEDIATRICS | Facility: CLINIC | Age: 82
End: 2022-06-22
Payer: COMMERCIAL

## 2022-06-22 VITALS
DIASTOLIC BLOOD PRESSURE: 72 MMHG | OXYGEN SATURATION: 99 % | WEIGHT: 210 LBS | BODY MASS INDEX: 31.01 KG/M2 | HEART RATE: 70 BPM | TEMPERATURE: 97.3 F | SYSTOLIC BLOOD PRESSURE: 150 MMHG | RESPIRATION RATE: 16 BRPM

## 2022-06-22 DIAGNOSIS — R53.83 FATIGUE, UNSPECIFIED TYPE: ICD-10-CM

## 2022-06-22 DIAGNOSIS — I10 HYPERTENSION GOAL BP (BLOOD PRESSURE) < 140/90: ICD-10-CM

## 2022-06-22 DIAGNOSIS — E11.69 TYPE 2 DIABETES MELLITUS WITH OTHER SPECIFIED COMPLICATION, WITH LONG-TERM CURRENT USE OF INSULIN (H): Primary | ICD-10-CM

## 2022-06-22 DIAGNOSIS — N18.31 STAGE 3A CHRONIC KIDNEY DISEASE (H): ICD-10-CM

## 2022-06-22 DIAGNOSIS — Z79.4 TYPE 2 DIABETES MELLITUS WITH OTHER SPECIFIED COMPLICATION, WITH LONG-TERM CURRENT USE OF INSULIN (H): Primary | ICD-10-CM

## 2022-06-22 DIAGNOSIS — I48.0 PAROXYSMAL ATRIAL FIBRILLATION (H): ICD-10-CM

## 2022-06-22 DIAGNOSIS — F51.01 PRIMARY INSOMNIA: ICD-10-CM

## 2022-06-22 DIAGNOSIS — E66.01 MORBIDLY OBESE (H): ICD-10-CM

## 2022-06-22 LAB
FOLATE SERPL-MCNC: 13.9 NG/ML (ref 4.6–34.8)
HBA1C MFR BLD: 6.1 % (ref 0–5.6)
VIT B12 SERPL-MCNC: 677 PG/ML (ref 232–1245)

## 2022-06-22 PROCEDURE — 84443 ASSAY THYROID STIM HORMONE: CPT | Performed by: INTERNAL MEDICINE

## 2022-06-22 PROCEDURE — 82607 VITAMIN B-12: CPT | Performed by: INTERNAL MEDICINE

## 2022-06-22 PROCEDURE — 99215 OFFICE O/P EST HI 40 MIN: CPT | Performed by: INTERNAL MEDICINE

## 2022-06-22 PROCEDURE — 82746 ASSAY OF FOLIC ACID SERUM: CPT | Performed by: INTERNAL MEDICINE

## 2022-06-22 PROCEDURE — 36415 COLL VENOUS BLD VENIPUNCTURE: CPT | Performed by: INTERNAL MEDICINE

## 2022-06-22 PROCEDURE — 82306 VITAMIN D 25 HYDROXY: CPT | Performed by: INTERNAL MEDICINE

## 2022-06-22 PROCEDURE — 83036 HEMOGLOBIN GLYCOSYLATED A1C: CPT | Performed by: INTERNAL MEDICINE

## 2022-06-22 PROCEDURE — 83550 IRON BINDING TEST: CPT | Performed by: INTERNAL MEDICINE

## 2022-06-22 PROCEDURE — 82728 ASSAY OF FERRITIN: CPT | Performed by: INTERNAL MEDICINE

## 2022-06-22 RX ORDER — METOPROLOL TARTRATE 50 MG
50 TABLET ORAL 2 TIMES DAILY
Qty: 180 TABLET | Refills: 0 | Status: SHIPPED | OUTPATIENT
Start: 2022-06-22 | End: 2022-07-08

## 2022-06-22 RX ORDER — AMLODIPINE BESYLATE 5 MG/1
5 TABLET ORAL DAILY
Qty: 90 TABLET | Refills: 0 | Status: SHIPPED | OUTPATIENT
Start: 2022-06-22 | End: 2022-08-02 | Stop reason: DRUGHIGH

## 2022-06-22 ASSESSMENT — PAIN SCALES - GENERAL: PAINLEVEL: NO PAIN (0)

## 2022-06-22 NOTE — PATIENT INSTRUCTIONS
For your sleeping: try melatonin  Start with 3 mg, take it 60 min before bed.   You can increase to 6 mg or 9 mg if the 3 mg dose is not effective.   You can try a 10 mg extended release option, also take this 60 min before bed.   Give each strategy at least about 5 days in a row before trying something else.

## 2022-06-22 NOTE — PROGRESS NOTES
Assessment & Plan     Type 2 diabetes mellitus with other specified complication, with long-term current use of insulin (H)  At goal. Most recent A1c was 6.2%, due for labs. Following with MTM, next visit in 2 months.   - Hemoglobin A1c; Future  - Hemoglobin A1c    Morbidly obese (H)  Not able to exercise much right now due to fatigue.     Stage 3a chronic kidney disease (H)  Discussed renal protection.   - Hemoglobin A1c; Future  - Hemoglobin A1c    Hypertension goal BP (blood pressure) < 140/90  Not at goal, will add back amlodipine. Continue to monitor at home.   - amLODIPine (NORVASC) 5 MG tablet; Take 1 tablet (5 mg) by mouth daily  - metoprolol tartrate (LOPRESSOR) 50 MG tablet; Take 1 tablet (50 mg) by mouth 2 times daily    Paroxysmal atrial fibrillation (H)  Recently saw cardiology. Not anticoagulated due to bleeding risk and Plavix (for CAD).   - Ferritin; Future  - Iron and iron binding capacity; Future  - Ferritin  - Iron and iron binding capacity    Fatigue, unspecified type  Unclear etiology - may be deconditioning. He will be undergoing angiography next week (per discussion with cardiology) to rule out coronary disease as a cause of these symptoms. Will also check labs (iron, vitamins D, B12, TSH). Also discussed possible sleep apnea as he does wake multiple times per night, has hypertension. Will refer to sleep medicine for evaluation and possible sleep study.   - Vitamin B12  - Folate  - Vitamin D Deficiency; Future  - TSH with free T4 reflex; Future  - Ferritin; Future  - Iron and iron binding capacity; Future  - Adult Sleep Eval & Management  Referral; Future  - Vitamin D Deficiency  - TSH with free T4 reflex  - Ferritin  - Iron and iron binding capacity  - Folate; Future  - Folate    Primary insomnia  Discussed use of melatonin. Will trial this and make appointment for sleep medicine to discuss sleep study.   - Adult Sleep Eval & Management  Referral; Future    Body mass index  "(BMI) 31.0-31.9, adult   Due for vitamin D check.   - Vitamin D Deficiency; Future  - Vitamin D Deficiency          42 minutes spent on the date of the encounter doing chart review, history and exam, documentation and further activities per the note       BMI:   Estimated body mass index is 31.01 kg/m  as calculated from the following:    Height as of 6/15/22: 1.753 m (5' 9\").    Weight as of this encounter: 95.3 kg (210 lb).   Weight management plan: Discussed healthy diet and exercise guidelines        Return in about 6 months (around 12/22/2022) for Follow up.    Vanessa Munson MD  [facility] CYRUS Leblanc is a 81 year old, presenting for the following health issues:  Hypertension, Diabetes, Fatigue, Insomnia, and Memory Loss      History of Present Illness       Diabetes:   He presents for follow up of diabetes.  He is checking home blood glucose a few times a month. He checks blood glucose before and after meals.  Blood glucose is sometimes over 200 and never under 70. When his blood glucose is low, the patient is asymptomatic for confusion, blurred vision, lethargy and reports not feeling dizzy, shaky, or weak.  He has no concerns regarding his diabetes at this time.  He is having burning in feet. The patient has had a diabetic eye exam in the last 12 months. Eye exam performed on 6/2022. Location of last eye exam [facility] .        Hypertension: He presents for follow up of hypertension.  He does not check blood pressure  regularly outside of the clinic. Outside blood pressures have been over 140/90. He does not follow a low salt diet.     Reason for visit:  Fatigue, insomnia, and memory loss  Symptom onset:  More than a month  Symptom intensity:  Moderate  Symptom progression:  Worsening  Had these symptoms before:  No  What makes it worse:  None  What makes it better:  None    He eats 2-3 servings of fruits and vegetables daily.He consumes 0 sweetened beverage(s) daily.He exercises " "with enough effort to increase his heart rate 30 to 60 minutes per day.  He exercises with enough effort to increase his heart rate 3 or less days per week. He is missing 1 dose(s) of medications per week.  He is not taking prescribed medications regularly due to remembering to take.     Fatigue - has been terrible, limiting his activity and limiting his enjoyment of life. Angiogram planned for     Sleep - falls asleep easily, but wakes up after a couple of hours. Always wakes up around midnight, is up for at least an hour, then falls back asleep.  Wakes again at least once.   Feels he hasn't gotten more than a few hours of sleep per night for a long time. Not sure what wakes him up. Gets up around 6:30/7am in the morning. Doesn't nap during the day. Doesn't think he snores. Does think he has tried melatonin.     Blood pressure - Recently saw Dr. Bah from cardiology.  Blood pressure was not addressed, but may benefit from addition of amlodipine. Had been on this in the past, uncertain why it was stopped. Does check his bp at home, in the late afternoon it gets to be \"pretty normal\" maybe down to 130/70. In the mornings, it is higher 150/90.       Some of the family has had covid (the first ones were diagnosed 13 days ago). It has gone through many other family members.  Another was diagnosed 2 days ago. Family reunion coming up in 10 days in a large cabin up [place].           Review of Systems   Constitutional, HEENT, cardiovascular, pulmonary, gi and gu systems are negative, except as otherwise noted.      Objective    BP (!) 150/72   Pulse 70   Temp 97.3  F (36.3  C)   Resp 16   Wt 95.3 kg (210 lb)   SpO2 99%   BMI 31.01 kg/m    Body mass index is 31.01 kg/m .  Physical Exam   GENERAL: healthy, alert and no distress  EYES: Eyes grossly normal to inspection, PERRL and conjunctivae and sclerae normal  RESP: lungs clear to auscultation - no rales, rhonchi or wheezes  CV: regular rate and rhythm, normal S1 " S2, no S3 or S4, no murmur, click or rub, no peripheral edema and peripheral pulses strong  MS: no gross musculoskeletal defects noted, no edema  SKIN: no suspicious lesions or rashes  NEURO: Normal strength and tone, mentation intact and speech normal  PSYCH: mentation appears normal, affect normal/bright                    .  ..

## 2022-06-23 LAB
DEPRECATED CALCIDIOL+CALCIFEROL SERPL-MC: 46 UG/L (ref 20–75)
FERRITIN SERPL-MCNC: 240 NG/ML (ref 26–388)
IRON SATN MFR SERPL: 19 % (ref 15–46)
IRON SERPL-MCNC: 60 UG/DL (ref 35–180)
TIBC SERPL-MCNC: 317 UG/DL (ref 240–430)
TSH SERPL DL<=0.005 MIU/L-ACNC: 1.94 MU/L (ref 0.4–4)

## 2022-06-27 ENCOUNTER — TELEPHONE (OUTPATIENT)
Dept: CARDIOLOGY | Facility: CLINIC | Age: 82
End: 2022-06-27

## 2022-06-27 ENCOUNTER — LAB (OUTPATIENT)
Dept: LAB | Facility: CLINIC | Age: 82
End: 2022-06-27
Payer: COMMERCIAL

## 2022-06-27 DIAGNOSIS — I48.91 ATRIAL FIBRILLATION, UNSPECIFIED TYPE (H): ICD-10-CM

## 2022-06-27 DIAGNOSIS — I42.9 CARDIOMYOPATHY, UNSPECIFIED TYPE (H): ICD-10-CM

## 2022-06-27 DIAGNOSIS — I25.10 CORONARY ARTERY DISEASE INVOLVING NATIVE CORONARY ARTERY OF NATIVE HEART WITHOUT ANGINA PECTORIS: Primary | ICD-10-CM

## 2022-06-27 DIAGNOSIS — I25.10 CORONARY ARTERY DISEASE INVOLVING NATIVE CORONARY ARTERY OF NATIVE HEART WITHOUT ANGINA PECTORIS: ICD-10-CM

## 2022-06-27 LAB — SARS-COV-2 RNA RESP QL NAA+PROBE: NEGATIVE

## 2022-06-27 PROCEDURE — U0005 INFEC AGEN DETEC AMPLI PROBE: HCPCS

## 2022-06-27 PROCEDURE — U0003 INFECTIOUS AGENT DETECTION BY NUCLEIC ACID (DNA OR RNA); SEVERE ACUTE RESPIRATORY SYNDROME CORONAVIRUS 2 (SARS-COV-2) (CORONAVIRUS DISEASE [COVID-19]), AMPLIFIED PROBE TECHNIQUE, MAKING USE OF HIGH THROUGHPUT TECHNOLOGIES AS DESCRIBED BY CMS-2020-01-R: HCPCS

## 2022-06-27 RX ORDER — LIDOCAINE 40 MG/G
CREAM TOPICAL
Status: CANCELLED | OUTPATIENT
Start: 2022-06-27

## 2022-06-27 RX ORDER — ASPIRIN 81 MG/1
243 TABLET, CHEWABLE ORAL ONCE
Status: CANCELLED | OUTPATIENT
Start: 2022-06-27

## 2022-06-27 RX ORDER — ASPIRIN 325 MG
325 TABLET ORAL ONCE
Status: CANCELLED | OUTPATIENT
Start: 2022-06-27

## 2022-06-27 RX ORDER — POTASSIUM CHLORIDE 750 MG/1
20 TABLET, EXTENDED RELEASE ORAL
Status: CANCELLED | OUTPATIENT
Start: 2022-06-27

## 2022-06-27 RX ORDER — SODIUM CHLORIDE 9 MG/ML
INJECTION, SOLUTION INTRAVENOUS CONTINUOUS
Status: CANCELLED | OUTPATIENT
Start: 2022-06-27

## 2022-06-27 NOTE — TELEPHONE ENCOUNTER
Spoke to patient to review angiogram scheduled 6-29-22 to arrive at 10:00 for a  12:00 procedure. Instructed patient to not eat or drink after midnight and take AM medications to include ASA. Verified patient does not have a known contrast allergy. Verified he will have someone  drive patient home and be available 24 hours post angiogram. Answered all patient questions and verbalized understanding. Covid test today Pending Orders placed in EPIC.

## 2022-06-29 ENCOUNTER — HOSPITAL ENCOUNTER (OUTPATIENT)
Facility: CLINIC | Age: 82
Discharge: HOME | End: 2022-06-29
Admitting: INTERNAL MEDICINE
Payer: COMMERCIAL

## 2022-06-29 VITALS
HEART RATE: 88 BPM | DIASTOLIC BLOOD PRESSURE: 95 MMHG | TEMPERATURE: 96.7 F | OXYGEN SATURATION: 97 % | RESPIRATION RATE: 16 BRPM | SYSTOLIC BLOOD PRESSURE: 159 MMHG

## 2022-06-29 DIAGNOSIS — I48.91 ATRIAL FIBRILLATION, UNSPECIFIED TYPE (H): ICD-10-CM

## 2022-06-29 DIAGNOSIS — I42.9 CARDIOMYOPATHY, UNSPECIFIED TYPE (H): ICD-10-CM

## 2022-06-29 DIAGNOSIS — I25.10 CORONARY ARTERY DISEASE INVOLVING NATIVE CORONARY ARTERY OF NATIVE HEART WITHOUT ANGINA PECTORIS: ICD-10-CM

## 2022-06-29 PROBLEM — Z98.890 STATUS POST CORONARY ANGIOGRAM: Status: ACTIVE | Noted: 2022-06-29

## 2022-06-29 LAB
ANION GAP SERPL CALCULATED.3IONS-SCNC: 6 MMOL/L (ref 3–14)
BUN SERPL-MCNC: 24 MG/DL (ref 7–30)
CALCIUM SERPL-MCNC: 9.3 MG/DL (ref 8.5–10.1)
CHLORIDE BLD-SCNC: 105 MMOL/L (ref 94–109)
CO2 SERPL-SCNC: 26 MMOL/L (ref 20–32)
CREAT SERPL-MCNC: 1.36 MG/DL (ref 0.66–1.25)
ERYTHROCYTE [DISTWIDTH] IN BLOOD BY AUTOMATED COUNT: 14.1 % (ref 10–15)
GFR SERPL CREATININE-BSD FRML MDRD: 53 ML/MIN/1.73M2
GLUCOSE BLD-MCNC: 146 MG/DL (ref 70–99)
HCT VFR BLD AUTO: 37.1 % (ref 40–53)
HGB BLD-MCNC: 12.6 G/DL (ref 13.3–17.7)
INR PPP: 1.03 (ref 0.85–1.15)
MCH RBC QN AUTO: 31 PG (ref 26.5–33)
MCHC RBC AUTO-ENTMCNC: 34 G/DL (ref 31.5–36.5)
MCV RBC AUTO: 91 FL (ref 78–100)
PLATELET # BLD AUTO: 66 10E3/UL (ref 150–450)
POTASSIUM BLD-SCNC: 4.2 MMOL/L (ref 3.4–5.3)
RBC # BLD AUTO: 4.06 10E6/UL (ref 4.4–5.9)
SODIUM SERPL-SCNC: 137 MMOL/L (ref 133–144)
WBC # BLD AUTO: 6.4 10E3/UL (ref 4–11)

## 2022-06-29 PROCEDURE — 99152 MOD SED SAME PHYS/QHP 5/>YRS: CPT | Performed by: INTERNAL MEDICINE

## 2022-06-29 PROCEDURE — 93454 CORONARY ARTERY ANGIO S&I: CPT | Performed by: INTERNAL MEDICINE

## 2022-06-29 PROCEDURE — 250N000011 HC RX IP 250 OP 636: Performed by: INTERNAL MEDICINE

## 2022-06-29 PROCEDURE — 85027 COMPLETE CBC AUTOMATED: CPT | Performed by: INTERNAL MEDICINE

## 2022-06-29 PROCEDURE — 82310 ASSAY OF CALCIUM: CPT | Performed by: INTERNAL MEDICINE

## 2022-06-29 PROCEDURE — 85610 PROTHROMBIN TIME: CPT | Performed by: INTERNAL MEDICINE

## 2022-06-29 PROCEDURE — 36415 COLL VENOUS BLD VENIPUNCTURE: CPT | Performed by: INTERNAL MEDICINE

## 2022-06-29 PROCEDURE — 258N000003 HC RX IP 258 OP 636: Performed by: INTERNAL MEDICINE

## 2022-06-29 PROCEDURE — 272N000001 HC OR GENERAL SUPPLY STERILE: Performed by: INTERNAL MEDICINE

## 2022-06-29 PROCEDURE — 93454 CORONARY ARTERY ANGIO S&I: CPT | Mod: 26 | Performed by: INTERNAL MEDICINE

## 2022-06-29 PROCEDURE — 250N000009 HC RX 250: Performed by: INTERNAL MEDICINE

## 2022-06-29 RX ORDER — NALOXONE HYDROCHLORIDE 0.4 MG/ML
0.4 INJECTION, SOLUTION INTRAMUSCULAR; INTRAVENOUS; SUBCUTANEOUS
Status: DISCONTINUED | OUTPATIENT
Start: 2022-06-29 | End: 2022-06-29 | Stop reason: HOSPADM

## 2022-06-29 RX ORDER — ASPIRIN 81 MG/1
243 TABLET, CHEWABLE ORAL ONCE
Status: DISCONTINUED | OUTPATIENT
Start: 2022-06-29 | End: 2022-06-29 | Stop reason: HOSPADM

## 2022-06-29 RX ORDER — NITROGLYCERIN 5 MG/ML
VIAL (ML) INTRAVENOUS
Status: DISCONTINUED | OUTPATIENT
Start: 2022-06-29 | End: 2022-06-29 | Stop reason: HOSPADM

## 2022-06-29 RX ORDER — NALOXONE HYDROCHLORIDE 0.4 MG/ML
0.2 INJECTION, SOLUTION INTRAMUSCULAR; INTRAVENOUS; SUBCUTANEOUS
Status: DISCONTINUED | OUTPATIENT
Start: 2022-06-29 | End: 2022-06-29 | Stop reason: HOSPADM

## 2022-06-29 RX ORDER — HEPARIN SODIUM 1000 [USP'U]/ML
INJECTION, SOLUTION INTRAVENOUS; SUBCUTANEOUS
Status: DISCONTINUED
Start: 2022-06-29 | End: 2022-06-29 | Stop reason: HOSPADM

## 2022-06-29 RX ORDER — POTASSIUM CHLORIDE 1500 MG/1
20 TABLET, EXTENDED RELEASE ORAL
Status: DISCONTINUED | OUTPATIENT
Start: 2022-06-29 | End: 2022-06-29 | Stop reason: HOSPADM

## 2022-06-29 RX ORDER — LIDOCAINE HYDROCHLORIDE 10 MG/ML
INJECTION, SOLUTION EPIDURAL; INFILTRATION; INTRACAUDAL; PERINEURAL
Status: DISCONTINUED
Start: 2022-06-29 | End: 2022-06-29 | Stop reason: HOSPADM

## 2022-06-29 RX ORDER — OXYCODONE HYDROCHLORIDE 5 MG/1
5 TABLET ORAL EVERY 4 HOURS PRN
Status: DISCONTINUED | OUTPATIENT
Start: 2022-06-29 | End: 2022-06-29 | Stop reason: HOSPADM

## 2022-06-29 RX ORDER — NITROGLYCERIN 5 MG/ML
VIAL (ML) INTRAVENOUS
Status: DISCONTINUED
Start: 2022-06-29 | End: 2022-06-29 | Stop reason: HOSPADM

## 2022-06-29 RX ORDER — LIDOCAINE 40 MG/G
CREAM TOPICAL
Status: DISCONTINUED | OUTPATIENT
Start: 2022-06-29 | End: 2022-06-29 | Stop reason: HOSPADM

## 2022-06-29 RX ORDER — VERAPAMIL HYDROCHLORIDE 2.5 MG/ML
INJECTION, SOLUTION INTRAVENOUS
Status: DISCONTINUED
Start: 2022-06-29 | End: 2022-06-29 | Stop reason: WASHOUT

## 2022-06-29 RX ORDER — FENTANYL CITRATE 50 UG/ML
INJECTION, SOLUTION INTRAMUSCULAR; INTRAVENOUS
Status: DISCONTINUED
Start: 2022-06-29 | End: 2022-06-29 | Stop reason: HOSPADM

## 2022-06-29 RX ORDER — NITROGLYCERIN 0.4 MG/1
TABLET SUBLINGUAL
Qty: 25 TABLET | Refills: 3 | Status: SHIPPED | OUTPATIENT
Start: 2022-06-29 | End: 2022-06-30

## 2022-06-29 RX ORDER — SODIUM CHLORIDE 9 MG/ML
INJECTION, SOLUTION INTRAVENOUS CONTINUOUS
Status: DISCONTINUED | OUTPATIENT
Start: 2022-06-29 | End: 2022-06-29 | Stop reason: HOSPADM

## 2022-06-29 RX ORDER — FENTANYL CITRATE 50 UG/ML
25 INJECTION, SOLUTION INTRAMUSCULAR; INTRAVENOUS
Status: DISCONTINUED | OUTPATIENT
Start: 2022-06-29 | End: 2022-06-29 | Stop reason: HOSPADM

## 2022-06-29 RX ORDER — IOPAMIDOL 755 MG/ML
INJECTION, SOLUTION INTRAVASCULAR
Status: DISCONTINUED | OUTPATIENT
Start: 2022-06-29 | End: 2022-06-29 | Stop reason: HOSPADM

## 2022-06-29 RX ORDER — ASPIRIN 325 MG
325 TABLET ORAL ONCE
Status: DISCONTINUED | OUTPATIENT
Start: 2022-06-29 | End: 2022-06-29 | Stop reason: HOSPADM

## 2022-06-29 RX ORDER — ATROPINE SULFATE 0.1 MG/ML
0.5 INJECTION INTRAVENOUS
Status: DISCONTINUED | OUTPATIENT
Start: 2022-06-29 | End: 2022-06-29 | Stop reason: HOSPADM

## 2022-06-29 RX ORDER — FLUMAZENIL 0.1 MG/ML
0.2 INJECTION, SOLUTION INTRAVENOUS
Status: DISCONTINUED | OUTPATIENT
Start: 2022-06-29 | End: 2022-06-29 | Stop reason: HOSPADM

## 2022-06-29 RX ORDER — ACETAMINOPHEN 325 MG/1
650 TABLET ORAL EVERY 4 HOURS PRN
Status: DISCONTINUED | OUTPATIENT
Start: 2022-06-29 | End: 2022-06-29 | Stop reason: HOSPADM

## 2022-06-29 RX ORDER — OXYCODONE HYDROCHLORIDE 10 MG/1
10 TABLET ORAL EVERY 4 HOURS PRN
Status: DISCONTINUED | OUTPATIENT
Start: 2022-06-29 | End: 2022-06-29 | Stop reason: HOSPADM

## 2022-06-29 RX ORDER — FENTANYL CITRATE 50 UG/ML
INJECTION, SOLUTION INTRAMUSCULAR; INTRAVENOUS
Status: DISCONTINUED | OUTPATIENT
Start: 2022-06-29 | End: 2022-06-29 | Stop reason: HOSPADM

## 2022-06-29 RX ADMIN — SODIUM CHLORIDE: 9 INJECTION, SOLUTION INTRAVENOUS at 10:12

## 2022-06-29 NOTE — IP AVS SNAPSHOT
[facility]  [address]  [facility] [address]  Phone: [phone]  Fax: [phone]                                      After Visit Summary   6/29/2022    Austin Garza   MRN: [id number]           After Visit Summary Signature Page    I have received my discharge instructions, and my questions have been answered. I have discussed any challenges I see with this plan with the nurse or doctor.    ..........................................................................................................................................  Patient/Patient Representative Signature      ..........................................................................................................................................  Patient Representative Print Name and Relationship to Patient    ..................................................               ................................................  Date                                   Time    ..........................................................................................................................................  Reviewed by Signature/Title    ...................................................              ..............................................  Date                                               Time          22EPIC Rev 08/18

## 2022-06-29 NOTE — PROGRESS NOTES
Patient discharged to home, accompanied by wife who picked him up at door.  Discharge instructions reviewed, no questions at this time.  Coby Akins RN

## 2022-06-29 NOTE — DISCHARGE INSTRUCTIONS
"  Discharge Instructions for Cardiac Catheterization   Cardiac catheterization is an invasive procedure to evaluate for certain heart problems involving the hearts chambers, valves, and blood vessels.  A thin, flexible tube (catheter) is put in a blood vessel in your groin or arm. Once the catheter is advanced into the heart measurements can be taken to assess blood flow, pressure, and oxygen. The healthcare provider can inject contrast fluid into your blood, which then flows to your heart. X-rays pictures can then be taken of your heart.  Often \"coronary angiography\" is performed as part of a cardiac catheterization which looks for blocked areas in the arteries that send blood to the heart. If a significant blockage is found your doctor may attempt to open up the artery which often involves placing a stent. Your provider will review the results of your procedure with you. Be sure to ask any questions you have before you leave. This sheet will help you take care of yourself at home.  Home care  Don't drive or make any important decisions for at least 24 hours after getting any type of sedation or anesthesia.   Arrange to have a responsible adult drive you home after your procedure.  Only do light and easy activities for the next  2 to 3 days. Ask for help with chores and errands while you recover. Have someone drive you to your appointments.  Don't lift anything heavier than 10 lbs for 3 days.  Ask your healthcare team when you can expect to return to work. Unless your job involves lifting, you may be able to return to your normal activities within a couple of days.  Take your medicines as directed. Don't skip doses.  Drink  6 to 8 glasses of water a day. This is to help flush the contrast dye out of your body. Call your healthcare team if your urine has any change in color.  Take your temperature each day for 7 days. If you feel cold and clammy or start sweating, take your temperature right away and call your " healthcare team.  Check your incisions every day for signs of infection. These include redness, swelling, and drainage. It's normal to have a small bruise or bump where the catheter was inserted. A bruise that's getting larger is not normal and should be reported to your healthcare team. If you see blood forming in the incision, call your healthcare team. Go to the emergency department if you have uncontrolled bleeding from the artery site. This is especially true if you take medicines that make it hard for your blood to clot. Examples are aspirin, clopidogrel, and warfarin.  Eat a healthy diet. Make sure it's low in fat, salt, and cholesterol. Ask your healthcare team for diet information.  Stop smoking. Enroll in a stop-smoking program or ask your healthcare team for help. Stop-smoking programs can be life saving.  Exercise as your healthcare team tells you to. Your healthcare team may recommend you start a cardiac rehabilitation program. Cardiac rehab is an exercise program in which trained healthcare staff watch your progress and stress on your heart while you exercise. Ask your team how to enroll.  Don't swim or take baths until access site is completely healed - usually about 7 days.  You can shower the day after the procedure. Keep the site clean and dry. This keeps the incision from getting wet and infected until the skin and artery can heal.  Be sure to follow all after-care instructions.     Follow-up care  Make a follow-up appointment as advised by our staff. It's common to have a follow-up appointment 2 to 4 weeks after an angioplasty or coronary stent procedure.  Make a yearly appointment, too. This is to make sure you are still doing well and not having any new symptoms.  Don't wait for a follow-up appointment if your medicines aren't working or you are having heart-related symptoms.    When to seek medical care  Call your healthcare provider right away if you have any of the following:  Chest  pain  Constant or increasing pain or numbness in your leg  Fever of 100.4  F ( 38.0 C) or higher, or as directed by your healthcare provider  Symptoms of infection. These include redness, swelling, drainage, or warmth at the incision site.  Shortness of breath  A leg that feels cold or appears blue  Bleeding, bruising, or a lot of swelling where the catheter was inserted  Blood in your urine  Black or tarry stools  Any unusual bleeding  Kavin last reviewed this educational content on 10/1/2019    2446-7617 [employer]. All rights reserved. This information is not intended as a substitute for professional medical care. Always follow your healthcare professional's instructions.

## 2022-06-29 NOTE — PRE-PROCEDURE
GENERAL PRE-PROCEDURE:   Procedure:  Heart catheterization possible intervention  Date/Time:  6/29/2022 12:12 PM    Written consent obtained?: Yes    Risks and benefits: Risks, benefits and alternatives were discussed    Consent given by:  Patient  Patient states understanding of procedure being performed: Yes    Patient's understanding of procedure matches consent: Yes    Procedure consent matches procedure scheduled: Yes    Expected level of sedation:  Moderate  Appropriately NPO:  Yes  Lungs:  Lungs clear with good breath sounds bilaterally  Heart:  Normal heart sounds and rate  History & Physical reviewed:  History and physical reviewed and no updates needed  Statement of review:  I have reviewed the lab findings, diagnostic data, medications, and the plan for sedation  discussed  Risk benefit indication for cath poss intervention with pt and family  Discussed addn risk with low plt on plavix and if stent may add asa  All questions answered and he wishes to proceed

## 2022-06-29 NOTE — IP AVS SNAPSHOT
"After Visit Summary Template Not Found    This Print Group is only intended to be used in the After Visit Summary and can only be used in a report that uses a released After Visit Summary Template.                       MRN:[id number]                          After Visit Summary   6/29/2022    Austin Garza   MRN: [id number]           Visit Information        Department      6/29/2022  9:41 AM [facility]          Review of your medicines      START taking       Dose / Directions   nitroGLYcerin 0.4 MG sublingual tablet  Commonly known as: NITROSTAT  Used for: Coronary artery disease involving native coronary artery of native heart without angina pectoris      One tablet under the tongue every 5 minutes if needed for chest pain. May repeat every 5 minutes for a maximum of 3 doses in 15 minutes\"  Quantity: 25 tablet  Refills: 3        CONTINUE these medicines which have NOT CHANGED       Dose / Directions   amLODIPine 5 MG tablet  Commonly known as: NORVASC  Used for: Hypertension goal BP (blood pressure) < 140/90      Dose: 5 mg  Take 1 tablet (5 mg) by mouth daily  Quantity: 90 tablet  Refills: 0     bacitracin 500 UNIT/GM Oint      Dose: 1 Application  Apply 1 Application topically as needed  Refills: 0     BD SHRUTHI U/F 32G X 4 MM miscellaneous  Generic drug: insulin pen needle      Use 4 daily as directed.  Quantity: 300 each  Refills: 5     blood glucose monitoring meter device kit  Commonly known as: NO BRAND SPECIFIED  Used for: Type 2 diabetes mellitus with hypoglycemia without coma, without long-term current use of insulin (H)      Use to test blood sugar 2 times daily or as directed.  Quantity: 1 kit  Refills: 0     clopidogrel 75 MG tablet  Commonly known as: PLAVIX  Used for: Abdominal aortic aneurysm (AAA) without rupture (H)      Dose: 75 mg  Take 1 tablet (75 mg) by mouth daily  Quantity: 90 tablet  Refills: 3     Contour Next Test test strip  Generic drug: blood " glucose      Use to test blood sugar 3-4 times daily or as directed.  Quantity: 100 strip  Refills: 6     insulin glargine 100 UNIT/ML pen  Commonly known as: LANTUS PEN  Used for: Type 2 diabetes mellitus with other specified complication, with long-term current use of insulin (H)      Dose: 5 Units  Inject 5 Units Subcutaneous daily For 1 week then off onto Trulicity 1.5mg weekly.  Quantity: 15 mL  Refills: 0     metoprolol tartrate 50 MG tablet  Commonly known as: LOPRESSOR  Used for: Hypertension goal BP (blood pressure) < 140/90      Dose: 50 mg  Take 1 tablet (50 mg) by mouth 2 times daily  Quantity: 180 tablet  Refills: 0     rosuvastatin 20 MG tablet  Commonly known as: CRESTOR  Used for: Atrial fibrillation, unspecified type (H)      Dose: 20 mg  Take 1 tablet (20 mg) by mouth daily  Quantity: 90 tablet  Refills: 3     Trulicity 1.5 MG/0.5ML pen  Used for: Type 2 diabetes mellitus with other specified complication, with long-term current use of insulin (H)  Generic drug: dulaglutide      Dose: 1.5 mg  Inject 1.5 mg Subcutaneous every 7 days  Quantity: 6 mL  Refills: 1           Where to get your medicines      Some of these will need a paper prescription and others can be bought over the counter. Ask your nurse if you have questions.    Bring a paper prescription for each of these medications  nitroGLYcerin 0.4 MG sublingual tablet           Prescriptions were sent or printed at these locations (1 Prescription)            Other Prescriptions                Printed at Department/Unit printer (1 of 1)         nitroGLYcerin (NITROSTAT) 0.4 MG sublingual tablet                Protect others around you: Learn how to safely use, store and throw away your medicines at www.disposemymeds.org.       Follow-ups after your visit       Additional Services     Cardiovascular Surgery Referral      Please be aware that coverage of these services is subject to the terms and limitations of your health insurance plan.  Call  "member services at your health plan with any benefit or coverage questions.  [facility] will call you to coordinate your care as prescribed by your provider. If you don't hear from a representative within 2 business days, please call [phone]         Cardiology              Scheduling Instructions: [facility] will call you to coordinate your care as prescribed by your provider. If you don't hear from a representative within 2 business days, please call [phone]      Your next 10 appointments already scheduled    Jul 08, 2022  3:20 PM  Return Cardiology with GISELL Jaimes CNP  [facility] ([facility] ) [address]  [facility] [address] [phone]        Aug 02, 2022  9:45 AM  (Arrive by 9:30 AM)  LAB with EA LAB  [facility] ([facility] ) [address] [place] [address]  [address]  [facility] [address] [phone]   Please do not eat 10-12 hours before your appointment if you are coming in fasting for labs on lipids, cholesterol, or glucose (sugar). Does not apply to pregnant women. Water, tea and black coffee (with nothing added) is okay. Do not drink other fluids, diet soda or gum. If you have concerns about taking your medications, please send a message by clicking on Secure Messaging, Message Your Care Team.       Aug 02, 2022 10:00 AM  Pharmacist Visit with Klaudia Santa RPH  [facility] ([facility] ) [address] [place] [address]  [address]  [facility] [address] [phone]   Please plan to arrive at the clinic at your \"Arrive By\" time for your appointment. Our late policy will be enforced based on this time.  Please bring all of your medications to your visit.       Sep 13, 2022  9:15 AM  LAB with RH MA LAB  [facility] ([facility] " ) [address]  [facility] [address]  [phone]   Please do not eat 10-12 hours before your appointment if you are coming in fasting for labs on lipids, cholesterol, or glucose (sugar). Does not apply to pregnant women. Water, tea and black coffee (with nothing added) is okay. Do not drink other fluids, diet soda or gum. If you have concerns about taking your medications, please send a message by clicking on Secure Messaging, Message Your Care Team.       Sep 15, 2022 11:00 AM  Return Visit with Miriam Badillo DO  [facility] ([facility] ) [address]  [facility] [address]  [phone]           Care Instructions       After Care Instructions     Discharge Instructions - Follow up with [facility] Heart Nurse Practitioner       Follow up with [facility] Heart Nurse Practitioner at [facility] of patient preference in 7-10 days. DR CABA PATIENT         Discharge Instructions - IF on Metformin (Glucophage or Glucovance) or Metformin containing medications on day of the procedure and for 48 hours after IV contrast given- Patients with acute kidney injury or severe chronic kidney disease (stage IV or stage V; i.e., eGFR less than 30)      IF on Metformin (Glucophage or Glucovance) or Metformin containing medications , schedule a Basic Metabolic Panel at [facility] or Primary Clinic in 48 - 72 hours post procedure and PRIOR TO resuming the Metformin or Metformin containing medications.  Hold Metformin (Glucophage or Glucovance) or Metformin containing medications until after the Basic Metabolic Panel on the 2nd or 3rd day following the procedure.  May resume after blood draw is complete.           Further instructions from your care team         Discharge Instructions for Cardiac Catheterization   Cardiac catheterization is an invasive procedure to evaluate for certain  "heart problems involving the hearts chambers, valves, and blood vessels.  A thin, flexible tube (catheter) is put in a blood vessel in your groin or arm. Once the catheter is advanced into the heart measurements can be taken to assess blood flow, pressure, and oxygen. The healthcare provider can inject contrast fluid into your blood, which then flows to your heart. X-rays pictures can then be taken of your heart.  Often \"coronary angiography\" is performed as part of a cardiac catheterization which looks for blocked areas in the arteries that send blood to the heart. If a significant blockage is found your doctor may attempt to open up the artery which often involves placing a stent. Your provider will review the results of your procedure with you. Be sure to ask any questions you have before you leave. This sheet will help you take care of yourself at home.  Home care  Don't drive or make any important decisions for at least 24 hours after getting any type of sedation or anesthesia.   Arrange to have a responsible adult drive you home after your procedure.  Only do light and easy activities for the next  2 to 3 days. Ask for help with chores and errands while you recover. Have someone drive you to your appointments.  Don't lift anything heavier than 10 lbs for 3 days.  Ask your healthcare team when you can expect to return to work. Unless your job involves lifting, you may be able to return to your normal activities within a couple of days.  Take your medicines as directed. Don't skip doses.  Drink  6 to 8 glasses of water a day. This is to help flush the contrast dye out of your body. Call your healthcare team if your urine has any change in color.  Take your temperature each day for 7 days. If you feel cold and clammy or start sweating, take your temperature right away and call your healthcare team.  Check your incisions every day for signs of infection. These include redness, swelling, and drainage. It's normal " to have a small bruise or bump where the catheter was inserted. A bruise that's getting larger is not normal and should be reported to your healthcare team. If you see blood forming in the incision, call your healthcare team. Go to the emergency department if you have uncontrolled bleeding from the artery site. This is especially true if you take medicines that make it hard for your blood to clot. Examples are aspirin, clopidogrel, and warfarin.  Eat a healthy diet. Make sure it's low in fat, salt, and cholesterol. Ask your healthcare team for diet information.  Stop smoking. Enroll in a stop-smoking program or ask your healthcare team for help. Stop-smoking programs can be life saving.  Exercise as your healthcare team tells you to. Your healthcare team may recommend you start a cardiac rehabilitation program. Cardiac rehab is an exercise program in which trained healthcare staff watch your progress and stress on your heart while you exercise. Ask your team how to enroll.  Don't swim or take baths until access site is completely healed - usually about 7 days.  You can shower the day after the procedure. Keep the site clean and dry. This keeps the incision from getting wet and infected until the skin and artery can heal.  Be sure to follow all after-care instructions.     Follow-up care  Make a follow-up appointment as advised by our staff. It's common to have a follow-up appointment 2 to 4 weeks after an angioplasty or coronary stent procedure.  Make a yearly appointment, too. This is to make sure you are still doing well and not having any new symptoms.  Don't wait for a follow-up appointment if your medicines aren't working or you are having heart-related symptoms.    When to seek medical care  Call your healthcare provider right away if you have any of the following:  Chest pain  Constant or increasing pain or numbness in your leg  Fever of 100.4  F ( 38.0 C) or higher, or as directed by your healthcare  provider  Symptoms of infection. These include redness, swelling, drainage, or warmth at the incision site.  Shortness of breath  A leg that feels cold or appears blue  Bleeding, bruising, or a lot of swelling where the catheter was inserted  Blood in your urine  Black or tarry stools  Any unusual bleeding  Kavin last reviewed this educational content on 10/1/2019    1187-2386 [employer]. All rights reserved. This information is not intended as a substitute for professional medical care. Always follow your healthcare professional's instructions.          Additional Information About Your Visit       MyChart Information    [employer] gives you secure access to your electronic health record. If you see a primary care provider, you can also send messages to your care team and make appointments. If you have questions, please call your primary care clinic.  If you do not have a primary care provider, please call [phone] and they will assist you.       Care EveryWhere ID    This is your Care EveryWhere ID. This could be used by other organizations to access your [place] medical records  [id number]       Your Vitals Were  Most recent update: 6/29/2022  3:33 PM    Blood Pressure   148/75    Pulse   88    Temperature   96.7  F (35.9  C) (Temporal)    Respirations   18    Pulse Oximetry   98%          Primary Care Provider  Vanessa Munson MD Office Phone #  [phone] Fax #  [phone]      Equal Access to Services    BAKARI OSMAN : Hadii gertrude simon hadasho Soomaali, waaxda luqadaha, qaybta kaalmada adeegyada, mina bennett . So [facility] [phone].    ATENCIÓN: Si habla español, tiene a edward disposición servicios gratuitos de asistencia lingüística. Llame al [phone].    We comply with applicable federal and state civil rights laws, including the [place] Human Rights Act. We do not discriminate on the basis of race, color, creed, Yarsanism, national origin, marital status,  age, disability, sex, sexual orientation, or gender identity.    If you would like an itemization of your charges they will now be available in [employer] 30 days after discharge. To access the itemized statements in [employer] go to billing/billing summary. From there select view account. There will be multiple tabs showing an overview of your account, detail, payments, and communications. From the communications tab you can see your monthly statements, your itemized statements, and any billing letters generated for your account. If you do not have a [employer] account and need help getting access please contact [employer] support at [phone].  If you would prefer to have your itemized statements mailed please contact our automated itemized bill request line at [phone] option  2.       Thank you!    Thank you for choosing [facility] for your care. Our goal is always to provide you with excellent care. Hearing back from our patients is one way we can continue to improve our services. Please take a few minutes to complete the written survey that you may receive in the mail after you visit. If you would like to speak to someone directly about your visit please contact Patient Relations at [phone]. Thank you!              Medication List      Medications          Morning Afternoon Evening Bedtime As Needed    amLODIPine 5 MG tablet  Also known as: NORVASC  INSTRUCTIONS: Take 1 tablet (5 mg) by mouth daily                     bacitracin 500 UNIT/GM Oint  INSTRUCTIONS: Apply 1 Application topically as needed                     BD SHRUTHI U/F 32G X 4 MM miscellaneous  INSTRUCTIONS: Use 4 daily as directed.  Generic drug: insulin pen needle                     blood glucose monitoring meter device kit  Also known as: NO BRAND SPECIFIED  INSTRUCTIONS: Use to test blood sugar 2 times daily or as directed.  Doctor's comments: Contour meter                     clopidogrel 75 MG tablet  Also known as:  "PLAVIX  INSTRUCTIONS: Take 1 tablet (75 mg) by mouth daily                     Contour Next Test test strip  INSTRUCTIONS: Use to test blood sugar 3-4 times daily or as directed.  Generic drug: blood glucose                     insulin glargine 100 UNIT/ML pen  Also known as: LANTUS PEN  INSTRUCTIONS: Inject 5 Units Subcutaneous daily For 1 week then off onto Trulicity 1.5mg weekly.  Doctor's comments: If Lantus is not covered by insurance, may substitute Basaglar or Semglee or other insulin glargine product per insurance preference at same dose and frequency.                       metoprolol tartrate 50 MG tablet  Also known as: LOPRESSOR  INSTRUCTIONS: Take 1 tablet (50 mg) by mouth 2 times daily                     nitroGLYcerin 0.4 MG sublingual tablet  Also known as: NITROSTAT  INSTRUCTIONS: One tablet under the tongue every 5 minutes if needed for chest pain. May repeat every 5 minutes for a maximum of 3 doses in 15 minutes\"  LAST TAKEN: Ask your nurse or doctor                     rosuvastatin 20 MG tablet  Also known as: CRESTOR  INSTRUCTIONS: Take 1 tablet (20 mg) by mouth daily                     Trulicity 1.5 MG/0.5ML pen  INSTRUCTIONS: Inject 1.5 mg Subcutaneous every 7 days  Generic drug: dulaglutide                       "

## 2022-06-30 ENCOUNTER — TELEPHONE (OUTPATIENT)
Dept: CARDIOLOGY | Facility: CLINIC | Age: 82
End: 2022-06-30

## 2022-06-30 DIAGNOSIS — I25.10 CORONARY ARTERY DISEASE INVOLVING NATIVE CORONARY ARTERY OF NATIVE HEART WITHOUT ANGINA PECTORIS: ICD-10-CM

## 2022-06-30 RX ORDER — NITROGLYCERIN 0.4 MG/1
TABLET SUBLINGUAL
Qty: 25 TABLET | Refills: 3 | Status: SHIPPED | OUTPATIENT
Start: 2022-06-30

## 2022-06-30 NOTE — PATIENT INSTRUCTIONS
Labs scheduled 9/13/22  Appt with Dr. Badillo scheduled 9/15/22  Tosha Burris, RN, BSN, GARIMA  RN Care Coordinator  [facility]  Ph: [phone]  F: [phone]

## 2022-06-30 NOTE — TELEPHONE ENCOUNTER
[facility] Call Center    Phone Message    May a detailed message be left on voicemail: yes     Reason for Call: Medication Question or concern regarding medication   Prescription Clarification  Name of Medication: nitroGLYcerin (NITROSTAT) 0.4 MG sublingual tablet  Prescribing Provider: Remington   Pharmacy: [facility] [address] in [place], [place]   What on the order needs clarification? No meds were filled at hospital upon discharge nor sent to pt's [facility] in [place].  Please call script in      Action Taken: Message routed to:  [facility] ([facility] cardio    Travel Screening: Not Applicable

## 2022-07-06 ENCOUNTER — TELEPHONE (OUTPATIENT)
Dept: CARDIOLOGY | Facility: CLINIC | Age: 82
End: 2022-07-06

## 2022-07-06 ENCOUNTER — MYC MEDICAL ADVICE (OUTPATIENT)
Dept: PEDIATRICS | Facility: CLINIC | Age: 82
End: 2022-07-06

## 2022-07-06 DIAGNOSIS — E11.69 TYPE 2 DIABETES MELLITUS WITH OTHER SPECIFIED COMPLICATION, WITH LONG-TERM CURRENT USE OF INSULIN (H): Primary | ICD-10-CM

## 2022-07-06 DIAGNOSIS — I25.10 CORONARY ARTERY DISEASE INVOLVING NATIVE HEART WITHOUT ANGINA PECTORIS, UNSPECIFIED VESSEL OR LESION TYPE: ICD-10-CM

## 2022-07-06 DIAGNOSIS — Z79.4 TYPE 2 DIABETES MELLITUS WITH OTHER SPECIFIED COMPLICATION, WITH LONG-TERM CURRENT USE OF INSULIN (H): Primary | ICD-10-CM

## 2022-07-06 NOTE — TELEPHONE ENCOUNTER
RN called patient's wife and patient and reviewed with them Dr. Beaulieu recommendations for CV surgery referral to discuss bypass for 3 vessel disease. RN answered both patient and patient's wife questions to their satisfaction. They will review their questions further with ENRIQUE [facility] on 7/8/22. Patient and patient's wife may elect to have CV surgery at [place], but have not decided yet. Will discuss further with ENRIQUE [employer] on 7/8/22.

## 2022-07-06 NOTE — TELEPHONE ENCOUNTER
Please review the MC message from pt & advise. Thanks.    Mayte RN  Patient Advocate Liason (PAL)  [facility]

## 2022-07-08 ENCOUNTER — OFFICE VISIT (OUTPATIENT)
Dept: CARDIOLOGY | Facility: CLINIC | Age: 82
End: 2022-07-08

## 2022-07-08 VITALS
HEIGHT: 69 IN | BODY MASS INDEX: 31.46 KG/M2 | HEART RATE: 88 BPM | DIASTOLIC BLOOD PRESSURE: 84 MMHG | OXYGEN SATURATION: 98 % | WEIGHT: 212.4 LBS | SYSTOLIC BLOOD PRESSURE: 156 MMHG

## 2022-07-08 DIAGNOSIS — I71.40 ABDOMINAL AORTIC ANEURYSM (AAA) WITHOUT RUPTURE (H): ICD-10-CM

## 2022-07-08 DIAGNOSIS — N18.31 STAGE 3A CHRONIC KIDNEY DISEASE (H): ICD-10-CM

## 2022-07-08 DIAGNOSIS — I25.10 CORONARY ARTERY DISEASE INVOLVING NATIVE CORONARY ARTERY OF NATIVE HEART WITHOUT ANGINA PECTORIS: Primary | ICD-10-CM

## 2022-07-08 DIAGNOSIS — I10 ESSENTIAL HYPERTENSION: ICD-10-CM

## 2022-07-08 DIAGNOSIS — I48.0 PAROXYSMAL ATRIAL FIBRILLATION (H): ICD-10-CM

## 2022-07-08 DIAGNOSIS — E78.49 OTHER HYPERLIPIDEMIA: ICD-10-CM

## 2022-07-08 DIAGNOSIS — I10 HYPERTENSION GOAL BP (BLOOD PRESSURE) < 140/90: ICD-10-CM

## 2022-07-08 PROCEDURE — 99215 OFFICE O/P EST HI 40 MIN: CPT | Performed by: NURSE PRACTITIONER

## 2022-07-08 RX ORDER — METOPROLOL SUCCINATE 100 MG/1
100 TABLET, EXTENDED RELEASE ORAL DAILY
Qty: 90 TABLET | Refills: 1 | Status: SHIPPED | OUTPATIENT
Start: 2022-07-08 | End: 2022-12-29

## 2022-07-08 NOTE — PATIENT INSTRUCTIONS
STOP metoprolol tartrate 50mg 2 x a day  START metoprolol succinate 100mg daily (to help with blood pressure control)  Follow up with Dr. Womack - cardiac surgeon as scheduled on Tuesday  Start monitoring blood pressure occasionally at home  Please call with any questions/concerns [phone]

## 2022-07-08 NOTE — TELEPHONE ENCOUNTER
"I spoke with the patient about his request to transfer care to [place] for cardiology.     His reason for this is because it is difficult to drive to and park at [facility], and because last time he went to [place], he felt that he was treated by lots of doctors all together for a couple of days and then it was all taken care of. \"It's quicker, it's all taken care of at once.\"    His wife Marcie adds that the experience is much more seamless and easy to take care of at [place] in [place].     They are anxious and frustrated at the time it's taken to get answers and information following his angiogram.     Vanessa Munson MD   Internal Medicine - Pediatrics     "

## 2022-07-08 NOTE — PROGRESS NOTES
Cardiology Clinic Progress Note  Austin Garza MRN# [id number]   YOB: 1940 Age: 81 year old   Primary Cardiologist: Dr. Bah Reason for visit: Cardiology follow up            Assessment and Plan:   Austin Garza is a 81 year old male here today for post angiogram follow up.     1. Multivessel coronary artery disease - per cath 6/2022, CV surgery evaluation recommended  2. Heart failure with mid range EF - 45-50% 8/2021, recovered to 58% on most recent echo done at [facility]  12/2021  3. AAA s/p EVAR 12/2017  4. Paroxsymal atrial fibrillation - S/p CHEYANNE/DCCV in August 2021  5. Hypertension  6. DM type 2  7. History of pericardial effusion - 9/2021 found during hospitalization at [facility], resolved on most recent echo 12/2021  8. CLL on Ibrutinib  9. Thrombocytopenia - Platelets 66 6/29/22 which was stable  10. CKD stage 3 - stable, creatinine from 6/29 1.36     I saw patient and his wife today for post angiogram follow up. They come with a long list of questions/complaints. Coronary angiogram 6/29/22 showed severe multivessel disease including 95% mid RCA stenosis, 70% proximal LAD stenosis, 35% 3rd diagonal stenosis, 50% mid-distal LAD stenosis, 80% 1st marginal stenosis, 99% 2nd marginal stenosis. CV surgery evaluation recommended for CABG consideration. We extensively reviewed these results and next steps including CV surgery evaluation for further guidance on recommendations.     Blood pressures were elevated in clinic and reported blood pressures at home elevated as well. Reviewed various options to optimize blood pressure control with titration of antihypertensives. Wife refuses consideration of carvedilol due to side effects she herself experienced on the medication. Ultimately we collaboratively decided on metoprolol XL 100mg daily.     Support given today, all questions answered.     Changes today: change metoprolol tartrate to metoprolol succinate 100mg daily to help with BP control    Follow  up plan:     CV surgery evaluation 7/12/22    Also in process of getting scheduled for 2nd opinion at [facility]    Follow up with Dr. Beaulieu scheduled for October 2022        History of Presenting Illness:    Austin Garza is a 81 year old male with a history of coronary artery disease, AAA s/p endovascular repair, hypertension, DM type 2, paroxsymal atrial fibrillation, heart failure with mid range ejection fraction, pericardial effusion and CLL on ibrutinib, CKD.     In August 2021 he was found to be in atrial fibrillation he underwent a CHEYANNE/cardioversion on 8/2/21. Echocardiogram completed 7/2021 showed LVEF 45-50%, no significant valve disease, he was in rapid atrial fibrillation at the time. Given his mid range LVEF stress test was recommended.     Nuclear stress test 8/2021 was abnormal showing nontransmural infarction in the apical inferior segments of LV. At that time coronary angiogram was recommended but due to communication failure this was not arranged.     Fall 2021 he was hospitalized at [facility] for flank pain and hematuria due to lack of bed availability in the [facility]. Echocardiogra showed LVEF 55%, RV normal size/function and small to moderate pericardial effusion, no significant valvular disease. He was started on colchicine and repeat echo 3 months later showed resolution of pericardial effusion. Recommended repeat echo in 4-6 weeks.     Repeat echo 12/2021 showed LVEF 58%, no regional wall motion abnormalities, RV normal size/function, pericardial effusion resolved.     He saw Dr. Bah for evaluation he was complaining of extreme fatigue with decrease in exercise endurance. Given his abnormal stress test and CT showing heavy coronary calcifications coronary angiogram was recommended. Amlodipine was resumed for blood pressure control.     Coronary angiogram 6/29/22 showed severe multivessel disease including 95% mid RCA stenosis, 70% proximal LAD stenosis, 35% 3rd diagonal stenosis, 50%  mid-distal LAD stenosis, 80% 1st marginal stenosis, 99% 2nd marginal stenosis. CV surgery evaluation recommended for CABG consideration.     Patient is here today for post angiogram. Wife present for visit.     Patient reports feeling good. He comes with an 8x11 inch paper with long list complaints and questions. Continues to feel fatigued. Denies chest pain or chest tightness. Denies shortness of breath at rest. Denies exertional dyspnea. Occasional orthopnea. Occasional postural lightheadedness, otherwise denies dizziness or other presyncopal symptoms. Denies tachycardia or palpitations. Taking medications daily, states he resume amlodipine as recommended by Dr. Munson a couple weeks ago. Right groin site reports bruised, denies pain or drainage.     Labs from  showed overall stable renal function. Blood pressure 146/78 and HR 88 in clinic today. Occasionally monitoring blood pressure at home, states 140s/90s prior to medications. Typically 130s systolically in the afternoon.     Appetite good. Eating most meals at home. Goes to the [employer], rides stationary bicycle for 30 mins. Typically drinking 2 cocktails on weekends and glass of wine with dinner most nights. Denies tobacco use.         Social History      Social History     Socioeconomic History     Marital status:      Spouse name: librado     Number of children: 0     Years of education: 17     Highest education level: Not on file   Occupational History     Occupation: retired   Tobacco Use     Smoking status: Former Smoker     Packs/day: 0.50     Years: 20.00     Pack years: 10.00     Quit date: 1975     Years since quittin.5     Smokeless tobacco: Former User   Substance and Sexual Activity     Alcohol use: Yes     Alcohol/week: 10.0 - 14.0 standard drinks     Types: 10 - 14 Standard drinks or equivalent per week     Comment: 3 drinks a day/wine     Drug use: No     Sexual activity: Never   Other Topics Concern     Parent/sibling w/  CABG, MI or angioplasty before 65F 55M? Not Asked   Social History Narrative     Not on file     Social Determinants of Health     Financial Resource Strain: Not on file   Food Insecurity: Not on file   Transportation Needs: Not on file   Physical Activity: Not on file   Stress: Not on file   Social Connections: Not on file   Intimate Partner Violence: Not At Risk     Fear of Current or Ex-Partner: No     Emotionally Abused: No     Physically Abused: No     Sexually Abused: No   Housing Stability: Not on file            Review of Systems:    10 point ROS neg other than the symptoms noted above in the HPI.         Physical Exam:   Vitals: There were no vitals taken for this visit.   Wt Readings from Last 4 Encounters:   06/22/22 95.3 kg (210 lb)   06/15/22 95.8 kg (211 lb 4.8 oz)   06/09/22 95.7 kg (211 lb)   03/15/22 98.3 kg (216 lb 11.2 oz)     GEN: well nourished, in no acute distress.  HEENT:  Pupils equal, round. Sclerae nonicteric.   NECK: Supple, no masses appreciated.   C/V:  Regular rate and rhythm, no murmur, rub or gallop.    RESP: Respirations are unlabored. Clear to auscultation bilaterally without wheezing, rales, or rhonchi.  GI: Abdomen soft, nontender.  EXTREM: No LE edema. Right groin access with surrounding ecchymosis, no tenderness, no bruit, no drainage  NEURO: Alert and oriented, cooperative.  SKIN: Warm and dry       Data:     LIPID RESULTS:  Lab Results   Component Value Date    CHOL 120 02/15/2022    CHOL 166 04/05/2021    HDL 33 (L) 02/15/2022    HDL 57 04/05/2021    LDL 58 02/15/2022    LDL 96 04/05/2021    TRIG 147 02/15/2022    TRIG 64 04/05/2021    CHOLHDLRATIO 2.5 10/23/2015     LIVER ENZYME RESULTS:  Lab Results   Component Value Date    AST 22 06/06/2022    AST 15 06/01/2021    ALT 42 06/06/2022    ALT 21 06/01/2021     CBC RESULTS:  Lab Results   Component Value Date    WBC 6.4 06/29/2022    WBC 12.7 (H) 06/01/2021    RBC 4.06 (L) 06/29/2022    RBC 4.22 (L) 06/01/2021    HGB 12.6 (L)  06/29/2022    HGB 13.5 06/01/2021    HCT 37.1 (L) 06/29/2022    HCT 41.3 06/01/2021    MCV 91 06/29/2022    MCV 98 06/01/2021    MCH 31.0 06/29/2022    MCH 32.0 06/01/2021    MCHC 34.0 06/29/2022    MCHC 32.7 06/01/2021    RDW 14.1 06/29/2022    RDW 13.0 06/01/2021    PLT 66 (L) 06/29/2022     (L) 06/01/2021     BMP RESULTS:  Lab Results   Component Value Date     06/29/2022     06/01/2021    POTASSIUM 4.2 06/29/2022    POTASSIUM 4.2 06/01/2021    CHLORIDE 105 06/29/2022    CHLORIDE 104 06/01/2021    CO2 26 06/29/2022    CO2 30 06/01/2021    ANIONGAP 6 06/29/2022    ANIONGAP 2 (L) 06/01/2021     (H) 06/29/2022     (H) 06/01/2021    BUN 24 06/29/2022    BUN 26 06/01/2021    CR 1.36 (H) 06/29/2022    CR 1.51 (H) 06/01/2021    GFRESTIMATED 53 (L) 06/29/2022    GFRESTIMATED 43 (L) 06/01/2021    GFRESTBLACK 50 (L) 06/01/2021    MAGDALENO 9.3 06/29/2022    MAGDALENO 8.9 06/01/2021      A1C RESULTS:  Lab Results   Component Value Date    A1C 6.1 (H) 06/22/2022    A1C 5.9 (H) 04/05/2021     INR RESULTS:  Lab Results   Component Value Date    INR 1.03 06/29/2022    INR 1.24 (H) 08/02/2021    INR 1.08 05/13/2019    INR 1.03 03/11/2019          Medications     Current Outpatient Medications   Medication Sig Dispense Refill     amLODIPine (NORVASC) 5 MG tablet Take 1 tablet (5 mg) by mouth daily 90 tablet 0     bacitracin 500 UNIT/GM OINT Apply 1 Application topically as needed       blood glucose (CONTOUR NEXT TEST) test strip Use to test blood sugar 3-4 times daily or as directed. 100 strip 6     blood glucose monitoring (NO BRAND SPECIFIED) meter device kit Use to test blood sugar 2 times daily or as directed. 1 kit 0     clopidogrel (PLAVIX) 75 MG tablet Take 1 tablet (75 mg) by mouth daily 90 tablet 3     dulaglutide (TRULICITY) 1.5 MG/0.5ML pen Inject 1.5 mg Subcutaneous every 7 days 6 mL 1     insulin glargine (LANTUS PEN) 100 UNIT/ML pen Inject 5 Units Subcutaneous daily For 1 week then off onto  "Trulicity 1.5mg weekly. 15 mL 0     insulin pen needle (BD SHRUTHI U/F) 32G X 4 MM miscellaneous Use 4 daily as directed. 300 each 5     metoprolol tartrate (LOPRESSOR) 50 MG tablet Take 1 tablet (50 mg) by mouth 2 times daily 180 tablet 0     nitroGLYcerin (NITROSTAT) 0.4 MG sublingual tablet One tablet under the tongue every 5 minutes if needed for chest pain. May repeat every 5 minutes for a maximum of 3 doses in 15 minutes\" 25 tablet 3     rosuvastatin (CRESTOR) 20 MG tablet Take 1 tablet (20 mg) by mouth daily 90 tablet 3          Past Medical History     Past Medical History:   Diagnosis Date     AAA (abdominal aortic aneurysm)      BCC (basal cell carcinoma of skin) - face      CLL (chronic lymphocytic leukemia)      Diaphragmatic hernia      Diverticulitis of colon      Esophageal reflux      Essential hypertension      Hyperlipidemia      Irritable bowel syndrome      Macular degeneration (senile) of retina      Mumps      Squamous Cell Carcinoma, Back 1988     Type 2 diabetes mellitus      Past Surgical History:   Procedure Laterality Date     ANESTHESIA CARDIOVERSION N/A 8/2/2021    Procedure: ANESTHESIA, FOR CARDIOVERSION;  Surgeon: GENERIC ANESTHESIA PROVIDER;  Location:  OR     APPENDECTOMY OPEN  1966     BONE MARROW BIOPSY, BONE SPECIMEN, NEEDLE/TROCAR N/A 11/21/2017    Procedure: BIOPSY BONE MARROW;  BONE MARROW BIOPSY;  Surgeon: Shady Garcia MD;  Location:  GI     COLONOSCOPY  2002     CV CORONARY ANGIOGRAM N/A 6/29/2022    Procedure: Coronary Angiogram;  Surgeon: Evaristo Beaulieu MD;  Location:  HEART CARDIAC CATH LAB     ENDOVASCULAR REPAIR ANEURYSM ABDOMINAL AORTA N/A 12/4/2017    Procedure: ENDOVASCULAR REPAIR ANEURYSM ABDOMINAL AORTA;  ENDOVASCULAR REPAIR ANEURYSM ABDOMINAL AORTA;  Surgeon: Milton Cazares MD;  Location:  OR     Fistulotomy with marsupialization for repair of fisture in ano  2007     IR ABDOMINAL AORTOGRAM  3/11/2019     IR VISCERAL EMBOLIZATION  " 5/13/2019     Multiple skin tags - resection  1998     Squamous cell skin cancer resection - back  1985-90     VASECTOMY       Family History   Problem Relation Age of Onset     Cerebrovascular Disease Father         x 2     Hypertension Mother      C.A.D. Mother      Cancer Mother         skin     Eye Disorder Mother         glaucoma     Prostate Cancer No family hx of      Cancer - colorectal No family hx of      Glaucoma No family hx of      Macular Degeneration No family hx of             Allergies   Ace inhibitors    50 minutes spent on the date of the encounter doing chart review, history and exam, documentation and further activities as noted above      GISELL Cantu [facility] HEART CARE  Pager: [phone]

## 2022-07-08 NOTE — LETTER
7/8/2022    Vanessa Munson MD  [address] [facility] [address]    RE: Austin Garza       Dear Colleague,     I had the pleasure of seeing Austin Garza in the [facility].  Cardiology Clinic Progress Note  Austin Garza MRN# [id number]   YOB: 1940 Age: 81 year old   Primary Cardiologist: Dr. Bah Reason for visit: Cardiology follow up            Assessment and Plan:   Austin Garza is a 81 year old male here today for post angiogram follow up.     1. Multivessel coronary artery disease - per cath 6/2022, CV surgery evaluation recommended  2. Heart failure with mid range EF - 45-50% 8/2021, recovered to 58% on most recent echo done at [facility]  12/2021  3. AAA s/p EVAR 12/2017  4. Paroxsymal atrial fibrillation - S/p CHEYANNE/DCCV in August 2021  5. Hypertension  6. DM type 2  7. History of pericardial effusion - 9/2021 found during hospitalization at [facility], resolved on most recent echo 12/2021  8. CLL on Ibrutinib  9. Thrombocytopenia - Platelets 66 6/29/22 which was stable  10. CKD stage 3 - stable, creatinine from 6/29 1.36     I saw patient and his wife today for post angiogram follow up. They come with a long list of questions/complaints. Coronary angiogram 6/29/22 showed severe multivessel disease including 95% mid RCA stenosis, 70% proximal LAD stenosis, 35% 3rd diagonal stenosis, 50% mid-distal LAD stenosis, 80% 1st marginal stenosis, 99% 2nd marginal stenosis. CV surgery evaluation recommended for CABG consideration. We extensively reviewed these results and next steps including CV surgery evaluation for further guidance on recommendations.     Blood pressures were elevated in clinic and reported blood pressures at home elevated as well. Reviewed various options to optimize blood pressure control with titration of antihypertensives. Wife refuses consideration of carvedilol due to side effects she herself experienced on the medication. Ultimately we  collaboratively decided on metoprolol XL 100mg daily.     Support given today, all questions answered.     Changes today: change metoprolol tartrate to metoprolol succinate 100mg daily to help with BP control    Follow up plan:     CV surgery evaluation 7/12/22    Also in process of getting scheduled for 2nd opinion at [facility]    Follow up with Dr. Beaulieu scheduled for October 2022        History of Presenting Illness:    Austin Garza is a 81 year old male with a history of coronary artery disease, AAA s/p endovascular repair, hypertension, DM type 2, paroxsymal atrial fibrillation, heart failure with mid range ejection fraction, pericardial effusion and CLL on ibrutinib, CKD.     In August 2021 he was found to be in atrial fibrillation he underwent a CHEYANNE/cardioversion on 8/2/21. Echocardiogram completed 7/2021 showed LVEF 45-50%, no significant valve disease, he was in rapid atrial fibrillation at the time. Given his mid range LVEF stress test was recommended.     Nuclear stress test 8/2021 was abnormal showing nontransmural infarction in the apical inferior segments of LV. At that time coronary angiogram was recommended but due to communication failure this was not arranged.     Fall 2021 he was hospitalized at [facility] for flank pain and hematuria due to lack of bed availability in the [facility]. Echocardiogra showed LVEF 55%, RV normal size/function and small to moderate pericardial effusion, no significant valvular disease. He was started on colchicine and repeat echo 3 months later showed resolution of pericardial effusion. Recommended repeat echo in 4-6 weeks.     Repeat echo 12/2021 showed LVEF 58%, no regional wall motion abnormalities, RV normal size/function, pericardial effusion resolved.     He saw Dr. Bah for evaluation he was complaining of extreme fatigue with decrease in exercise endurance. Given his abnormal stress test and CT showing heavy coronary calcifications coronary angiogram was  recommended. Amlodipine was resumed for blood pressure control.     Coronary angiogram 22 showed severe multivessel disease including 95% mid RCA stenosis, 70% proximal LAD stenosis, 35% 3rd diagonal stenosis, 50% mid-distal LAD stenosis, 80% 1st marginal stenosis, 99% 2nd marginal stenosis. CV surgery evaluation recommended for CABG consideration.     Patient is here today for post angiogram. Wife present for visit.     Patient reports feeling good. He comes with an 8x11 inch paper with long list complaints and questions. Continues to feel fatigued. Denies chest pain or chest tightness. Denies shortness of breath at rest. Denies exertional dyspnea. Occasional orthopnea. Occasional postural lightheadedness, otherwise denies dizziness or other presyncopal symptoms. Denies tachycardia or palpitations. Taking medications daily, states he resume amlodipine as recommended by Dr. Munson a couple weeks ago. Right groin site reports bruised, denies pain or drainage.     Labs from  showed overall stable renal function. Blood pressure 146/78 and HR 88 in clinic today. Occasionally monitoring blood pressure at home, states 140s/90s prior to medications. Typically 130s systolically in the afternoon.     Appetite good. Eating most meals at home. Goes to the [employer], rides stationary bicycle for 30 mins. Typically drinking 2 cocktails on weekends and glass of wine with dinner most nights. Denies tobacco use.         Social History      Social History     Socioeconomic History     Marital status:      Spouse name: librado     Number of children: 0     Years of education: 17     Highest education level: Not on file   Occupational History     Occupation: retired   Tobacco Use     Smoking status: Former Smoker     Packs/day: 0.50     Years: 20.00     Pack years: 10.00     Quit date: 1975     Years since quittin.5     Smokeless tobacco: Former User   Substance and Sexual Activity     Alcohol use: Yes      Alcohol/week: 10.0 - 14.0 standard drinks     Types: 10 - 14 Standard drinks or equivalent per week     Comment: 3 drinks a day/wine     Drug use: No     Sexual activity: Never   Other Topics Concern     Parent/sibling w/ CABG, MI or angioplasty before 65F 55M? Not Asked   Social History Narrative     Not on file     Social Determinants of Health     Financial Resource Strain: Not on file   Food Insecurity: Not on file   Transportation Needs: Not on file   Physical Activity: Not on file   Stress: Not on file   Social Connections: Not on file   Intimate Partner Violence: Not At Risk     Fear of Current or Ex-Partner: No     Emotionally Abused: No     Physically Abused: No     Sexually Abused: No   Housing Stability: Not on file            Review of Systems:    10 point ROS neg other than the symptoms noted above in the HPI.         Physical Exam:   Vitals: There were no vitals taken for this visit.   Wt Readings from Last 4 Encounters:   06/22/22 95.3 kg (210 lb)   06/15/22 95.8 kg (211 lb 4.8 oz)   06/09/22 95.7 kg (211 lb)   03/15/22 98.3 kg (216 lb 11.2 oz)     GEN: well nourished, in no acute distress.  HEENT:  Pupils equal, round. Sclerae nonicteric.   NECK: Supple, no masses appreciated.   C/V:  Regular rate and rhythm, no murmur, rub or gallop.    RESP: Respirations are unlabored. Clear to auscultation bilaterally without wheezing, rales, or rhonchi.  GI: Abdomen soft, nontender.  EXTREM: No LE edema. Right groin access with surrounding ecchymosis, no tenderness, no bruit, no drainage  NEURO: Alert and oriented, cooperative.  SKIN: Warm and dry       Data:     LIPID RESULTS:  Lab Results   Component Value Date    CHOL 120 02/15/2022    CHOL 166 04/05/2021    HDL 33 (L) 02/15/2022    HDL 57 04/05/2021    LDL 58 02/15/2022    LDL 96 04/05/2021    TRIG 147 02/15/2022    TRIG 64 04/05/2021    CHOLHDLRATIO 2.5 10/23/2015     LIVER ENZYME RESULTS:  Lab Results   Component Value Date    AST 22 06/06/2022    AST 15  06/01/2021    ALT 42 06/06/2022    ALT 21 06/01/2021     CBC RESULTS:  Lab Results   Component Value Date    WBC 6.4 06/29/2022    WBC 12.7 (H) 06/01/2021    RBC 4.06 (L) 06/29/2022    RBC 4.22 (L) 06/01/2021    HGB 12.6 (L) 06/29/2022    HGB 13.5 06/01/2021    HCT 37.1 (L) 06/29/2022    HCT 41.3 06/01/2021    MCV 91 06/29/2022    MCV 98 06/01/2021    MCH 31.0 06/29/2022    MCH 32.0 06/01/2021    MCHC 34.0 06/29/2022    MCHC 32.7 06/01/2021    RDW 14.1 06/29/2022    RDW 13.0 06/01/2021    PLT 66 (L) 06/29/2022     (L) 06/01/2021     BMP RESULTS:  Lab Results   Component Value Date     06/29/2022     06/01/2021    POTASSIUM 4.2 06/29/2022    POTASSIUM 4.2 06/01/2021    CHLORIDE 105 06/29/2022    CHLORIDE 104 06/01/2021    CO2 26 06/29/2022    CO2 30 06/01/2021    ANIONGAP 6 06/29/2022    ANIONGAP 2 (L) 06/01/2021     (H) 06/29/2022     (H) 06/01/2021    BUN 24 06/29/2022    BUN 26 06/01/2021    CR 1.36 (H) 06/29/2022    CR 1.51 (H) 06/01/2021    GFRESTIMATED 53 (L) 06/29/2022    GFRESTIMATED 43 (L) 06/01/2021    GFRESTBLACK 50 (L) 06/01/2021    MAGDALENO 9.3 06/29/2022    MAGDALENO 8.9 06/01/2021      A1C RESULTS:  Lab Results   Component Value Date    A1C 6.1 (H) 06/22/2022    A1C 5.9 (H) 04/05/2021     INR RESULTS:  Lab Results   Component Value Date    INR 1.03 06/29/2022    INR 1.24 (H) 08/02/2021    INR 1.08 05/13/2019    INR 1.03 03/11/2019          Medications     Current Outpatient Medications   Medication Sig Dispense Refill     amLODIPine (NORVASC) 5 MG tablet Take 1 tablet (5 mg) by mouth daily 90 tablet 0     bacitracin 500 UNIT/GM OINT Apply 1 Application topically as needed       blood glucose (CONTOUR NEXT TEST) test strip Use to test blood sugar 3-4 times daily or as directed. 100 strip 6     blood glucose monitoring (NO BRAND SPECIFIED) meter device kit Use to test blood sugar 2 times daily or as directed. 1 kit 0     clopidogrel (PLAVIX) 75 MG tablet Take 1 tablet (75 mg) by  "mouth daily 90 tablet 3     dulaglutide (TRULICITY) 1.5 MG/0.5ML pen Inject 1.5 mg Subcutaneous every 7 days 6 mL 1     insulin glargine (LANTUS PEN) 100 UNIT/ML pen Inject 5 Units Subcutaneous daily For 1 week then off onto Trulicity 1.5mg weekly. 15 mL 0     insulin pen needle (BD SHRUTHI U/F) 32G X 4 MM miscellaneous Use 4 daily as directed. 300 each 5     metoprolol tartrate (LOPRESSOR) 50 MG tablet Take 1 tablet (50 mg) by mouth 2 times daily 180 tablet 0     nitroGLYcerin (NITROSTAT) 0.4 MG sublingual tablet One tablet under the tongue every 5 minutes if needed for chest pain. May repeat every 5 minutes for a maximum of 3 doses in 15 minutes\" 25 tablet 3     rosuvastatin (CRESTOR) 20 MG tablet Take 1 tablet (20 mg) by mouth daily 90 tablet 3          Past Medical History     Past Medical History:   Diagnosis Date     AAA (abdominal aortic aneurysm)      BCC (basal cell carcinoma of skin) - face      CLL (chronic lymphocytic leukemia)      Diaphragmatic hernia      Diverticulitis of colon      Esophageal reflux      Essential hypertension      Hyperlipidemia      Irritable bowel syndrome      Macular degeneration (senile) of retina      Mumps      Squamous Cell Carcinoma, Back 1988     Type 2 diabetes mellitus      Past Surgical History:   Procedure Laterality Date     ANESTHESIA CARDIOVERSION N/A 8/2/2021    Procedure: ANESTHESIA, FOR CARDIOVERSION;  Surgeon: GENERIC ANESTHESIA PROVIDER;  Location: [facility] OR     APPENDECTOMY OPEN  1966     BONE MARROW BIOPSY, BONE SPECIMEN, NEEDLE/TROCAR N/A 11/21/2017    Procedure: BIOPSY BONE MARROW;  BONE MARROW BIOPSY;  Surgeon: Shady Garcia MD;  Location:  GI     COLONOSCOPY  2002     CV CORONARY ANGIOGRAM N/A 6/29/2022    Procedure: Coronary Angiogram;  Surgeon: Evaristo Beaulieu MD;  Location:  HEART CARDIAC CATH LAB     ENDOVASCULAR REPAIR ANEURYSM ABDOMINAL AORTA N/A 12/4/2017    Procedure: ENDOVASCULAR REPAIR ANEURYSM ABDOMINAL AORTA;  ENDOVASCULAR REPAIR " ANEURYSM ABDOMINAL AORTA;  Surgeon: Milton Cazares MD;  Location: [facility] OR     Fistulotomy with marsupialization for repair of fisture in ano  2007     IR ABDOMINAL AORTOGRAM  3/11/2019     IR VISCERAL EMBOLIZATION  5/13/2019     Multiple skin tags - resection  1998     Squamous cell skin cancer resection - back  1985-90     VASECTOMY       Family History   Problem Relation Age of Onset     Cerebrovascular Disease Father         x 2     Hypertension Mother      C.A.D. Mother      Cancer Mother         skin     Eye Disorder Mother         glaucoma     Prostate Cancer No family hx of      Cancer - colorectal No family hx of      Glaucoma No family hx of      Macular Degeneration No family hx of             Allergies   Ace inhibitors    50 minutes spent on the date of the encounter doing chart review, history and exam, documentation and further activities as noted above      GISELL Catnu CNP  [facility]  Pager: [phone]        Thank you for allowing me to participate in the care of your patient.      Sincerely,     GISELL Cantu CNP     [facility]  cc:   No referring provider defined for this encounter.

## 2022-07-12 ENCOUNTER — OFFICE VISIT (OUTPATIENT)
Dept: CARDIOLOGY | Facility: CLINIC | Age: 82
End: 2022-07-12
Payer: COMMERCIAL

## 2022-07-12 ENCOUNTER — DOCUMENTATION ONLY (OUTPATIENT)
Dept: OTHER | Facility: CLINIC | Age: 82
End: 2022-07-12

## 2022-07-12 VITALS
OXYGEN SATURATION: 98 % | BODY MASS INDEX: 31.4 KG/M2 | DIASTOLIC BLOOD PRESSURE: 82 MMHG | HEIGHT: 69 IN | SYSTOLIC BLOOD PRESSURE: 149 MMHG | WEIGHT: 212 LBS | HEART RATE: 78 BPM

## 2022-07-12 DIAGNOSIS — R06.02 SHORTNESS OF BREATH: Primary | ICD-10-CM

## 2022-07-12 DIAGNOSIS — I25.10 CORONARY ARTERY DISEASE INVOLVING NATIVE CORONARY ARTERY OF NATIVE HEART WITHOUT ANGINA PECTORIS: ICD-10-CM

## 2022-07-12 PROCEDURE — 99204 OFFICE O/P NEW MOD 45 MIN: CPT | Performed by: SURGERY

## 2022-07-12 NOTE — LETTER
7/12/2022       RE: Austin Garza  [address] [place] [address]     Dear Colleague,    Thank you for referring your patient, Austin Garza, to the [facility] at [facility]. Please see a copy of my visit note below.          Cardiothoracic Surgery Consult    Date of Service: 7/12/2022    REFERRING CARDIOLOGIST: Dr. Beaulieu    REASON FOR CONSULTATION: severe coronary artery disease     IMPRESSION AND PLAN: Mr. Garza is a 81 year old male with history of atrial fibrillation and now with severe multi vessel coronary artery disease. I had a discussion with him and his wife in detail about the disease process, the natural history and potential treatment options.    He and his wife are quite frustrated with the lack of communication since his angiogram. They are also frustrated with fragmented care across different systems with [facility] and [facility]. (Multiple different visits at different sites for various exams and procedures).    I stated that I am happy to offer them CABG here at [facility] pending preoperative imaging but they would like to seek care at [facility] where Mr. Garza has had a lot of of prior care.    I think this is very reasonable. I stated that they should schedule their surgery whereever they are most comfortable.    I left them with our contact information if they decide to seek care at [place].    Thank you very much for this referral.     Eleanor Womack MD   Cardiothoracic Surgery  P: [phone]  C: [phone]    HISTORY OF PRESENT ILLNESS: Mr. Garza is a 81 year old male with history of progressive shortness of breath and lethargy for the past few months found to have multi vessel coronary artery disease. History of abdominal aortic aneurysm s/p EVAR (on plavix), CLL. He has history of atrial fibrillation s/p cardioversion. No history of strokes and not on warfarin.      PAST MEDICAL HISTORY:   Past  Medical History:   Diagnosis Date     AAA (abdominal aortic aneurysm)      BCC (basal cell carcinoma of skin) - face      CLL (chronic lymphocytic leukemia)      Diaphragmatic hernia      Diverticulitis of colon      Esophageal reflux      Essential hypertension      Hyperlipidemia      Irritable bowel syndrome      Macular degeneration (senile) of retina      Mumps      Squamous Cell Carcinoma, Back 1988     Type 2 diabetes mellitus        PAST SURGICAL HISTORY:   Past Surgical History:   Procedure Laterality Date     ANESTHESIA CARDIOVERSION N/A 8/2/2021    Procedure: ANESTHESIA, FOR CARDIOVERSION;  Surgeon: GENERIC ANESTHESIA PROVIDER;  Location: [facility] OR     APPENDECTOMY OPEN  1966     BONE MARROW BIOPSY, BONE SPECIMEN, NEEDLE/TROCAR N/A 11/21/2017    Procedure: BIOPSY BONE MARROW;  BONE MARROW BIOPSY;  Surgeon: Shady Garcia MD;  Location:  GI     COLONOSCOPY  2002     CV CORONARY ANGIOGRAM N/A 6/29/2022    Procedure: Coronary Angiogram;  Surgeon: Evaristo Beaulieu MD;  Location:  HEART CARDIAC CATH LAB     ENDOVASCULAR REPAIR ANEURYSM ABDOMINAL AORTA N/A 12/4/2017    Procedure: ENDOVASCULAR REPAIR ANEURYSM ABDOMINAL AORTA;  ENDOVASCULAR REPAIR ANEURYSM ABDOMINAL AORTA;  Surgeon: Milton Cazares MD;  Location:  OR     Fistulotomy with marsupialization for repair of fisture in ano  2007     IR ABDOMINAL AORTOGRAM  3/11/2019     IR VISCERAL EMBOLIZATION  5/13/2019     Multiple skin tags - resection  1998     Squamous cell skin cancer resection - back  1985-90     VASECTOMY         ALLERGIES:   Allergies   Allergen Reactions     Ace Inhibitors Cough     cough  cough        CURRENT MEDICATIONS:  Current Outpatient Medications   Medication     amLODIPine (NORVASC) 5 MG tablet     bacitracin 500 UNIT/GM OINT     blood glucose (CONTOUR NEXT TEST) test strip     blood glucose monitoring (NO BRAND SPECIFIED) meter device kit     clopidogrel (PLAVIX) 75 MG tablet     dulaglutide (TRULICITY) 1.5  MG/0.5ML pen     insulin pen needle (BD SHRUTHI U/F) 32G X 4 MM miscellaneous     metoprolol succinate ER (TOPROL XL) 100 MG 24 hr tablet     nitroGLYcerin (NITROSTAT) 0.4 MG sublingual tablet     rosuvastatin (CRESTOR) 20 MG tablet     insulin glargine (LANTUS PEN) 100 UNIT/ML pen     No current facility-administered medications for this visit.         FAMILY HISTORY:   Family History   Problem Relation Age of Onset     Cerebrovascular Disease Father         x 2     Hypertension Mother      C.A.D. Mother      Cancer Mother         skin     Eye Disorder Mother         glaucoma     Prostate Cancer No family hx of      Cancer - colorectal No family hx of      Glaucoma No family hx of      Macular Degeneration No family hx of      The family history was reviewed and is not pertinent to the patient's disease/illness      SOCIAL HISTORY:   Social History     Socioeconomic History     Marital status:      Spouse name: librado     Number of children: 0     Years of education: 17     Highest education level: Not on file   Occupational History     Occupation: retired   Tobacco Use     Smoking status: Former Smoker     Packs/day: 0.50     Years: 20.00     Pack years: 10.00     Quit date: 1975     Years since quittin.5     Smokeless tobacco: Former User   Substance and Sexual Activity     Alcohol use: Yes     Alcohol/week: 10.0 - 14.0 standard drinks     Types: 10 - 14 Standard drinks or equivalent per week     Comment: 3 drinks a day/wine     Drug use: No     Sexual activity: Never   Other Topics Concern     Parent/sibling w/ CABG, MI or angioplasty before 65F 55M? Not Asked   Social History Narrative     Not on file     Social Determinants of Health     Financial Resource Strain: Not on file   Food Insecurity: Not on file   Transportation Needs: Not on file   Physical Activity: Not on file   Stress: Not on file   Social Connections: Not on file   Intimate Partner Violence: Not At Risk     Fear of Current or  "Ex-Partner: No     Emotionally Abused: No     Physically Abused: No     Sexually Abused: No   Housing Stability: Not on file         REVIEW OF SYSTEMS:  Review of Systems - Negative except shortness of breath  A complete 10 point review of systems was obtained and is negative other than the above stated complaints    PHYSICAL EXAMINATION:   Vitals: BP (!) 149/82   Pulse 78   Ht 1.753 m (5' 9\")   Wt 96.2 kg (212 lb)   SpO2 98%   BMI 31.31 kg/m    GENERAL:  Well developed and well nourished   HEENT: conjunctiva anicteric and sclera clear   CARDIOVASCULAR: normal sinus rhythm  RESPIRATIONS: breathing comfortably, no audible wheezing  ABDOMEN: soft, non distended  EXTREMITIES: no deformity, mild lower ext swelling  PSYCHIATRIC: alert and oriented, pleasant     LABORATORY STUDIES:   Lab Results   Component Value Date    WBC 6.4 06/29/2022    HGB 12.6 (L) 06/29/2022    HCT 37.1 (L) 06/29/2022    MCV 91 06/29/2022    PLT 66 (L) 06/29/2022      Lab Results   Component Value Date    BUN 24 06/29/2022     06/29/2022    CO2 26 06/29/2022     CARDIAC CATHETERIZATION: This study was personally reviewed by me.    TRANSTHORACIC ECHOCARDIOGRAM: pending        Again, thank you for allowing me to participate in the care of your patient.      Sincerely,    Eleanor Womack MD      " normal rate, regular rhythm, and no murmur.

## 2022-07-12 NOTE — PROGRESS NOTES
Patient seen in consultation with Dr Womack. Patient and wife would like pursue second opinion at [facility] where he is treated for his CLL. Literature and contact information given. Instructed patient to call if decides to progress in the [employer] system.

## 2022-07-12 NOTE — PROGRESS NOTES
Cardiothoracic Surgery Consult    Date of Service: 7/12/2022    REFERRING CARDIOLOGIST: Dr. Beaulieu    REASON FOR CONSULTATION: severe coronary artery disease     IMPRESSION AND PLAN: Mr. Garza is a 81 year old male with history of atrial fibrillation and now with severe multi vessel coronary artery disease. I had a discussion with him and his wife in detail about the disease process, the natural history and potential treatment options.    He and his wife are quite frustrated with the lack of communication since his angiogram. They are also frustrated with fragmented care across different systems with \[facility]\"". (Multiple different visits at different sites for various exams and procedures).    I stated that I am happy to offer them CABG here at [facility] pending preoperative imaging but they would like to seek care at [place] where Mr. Garza has had a lot of of prior care.    I think this is very reasonable. I stated that they should schedule their surgery whereever they are most comfortable.    I left them with our contact information if they decide to seek care at [place].    Thank you very much for this referral.     Eleanor Womack MD   Cardiothoracic Surgery  P: [phone]  C: [phone]    HISTORY OF PRESENT ILLNESS: Mr. Garza is a 81 year old male with history of progressive shortness of breath and lethargy for the past few months found to have multi vessel coronary artery disease. History of abdominal aortic aneurysm s/p EVAR (on plavix), CLL. He has history of atrial fibrillation s/p cardioversion. No history of strokes and not on warfarin.      PAST MEDICAL HISTORY:   Past Medical History:   Diagnosis Date     AAA (abdominal aortic aneurysm)      BCC (basal cell carcinoma of skin) - face      CLL (chronic lymphocytic leukemia)      Diaphragmatic hernia      Diverticulitis of colon      Esophageal reflux      Essential hypertension      Hyperlipidemia      Irritable bowel syndrome       Macular degeneration (senile) of retina      Mumps      Squamous Cell Carcinoma, Back 1988     Type 2 diabetes mellitus        PAST SURGICAL HISTORY:   Past Surgical History:   Procedure Laterality Date     ANESTHESIA CARDIOVERSION N/A 8/2/2021    Procedure: ANESTHESIA, FOR CARDIOVERSION;  Surgeon: GENERIC ANESTHESIA PROVIDER;  Location: [facility] OR     APPENDECTOMY OPEN  1966     BONE MARROW BIOPSY, BONE SPECIMEN, NEEDLE/TROCAR N/A 11/21/2017    Procedure: BIOPSY BONE MARROW;  BONE MARROW BIOPSY;  Surgeon: Shady Garcia MD;  Location:  GI     COLONOSCOPY  2002     CV CORONARY ANGIOGRAM N/A 6/29/2022    Procedure: Coronary Angiogram;  Surgeon: Evaristo Beaulieu MD;  Location:  HEART CARDIAC CATH LAB     ENDOVASCULAR REPAIR ANEURYSM ABDOMINAL AORTA N/A 12/4/2017    Procedure: ENDOVASCULAR REPAIR ANEURYSM ABDOMINAL AORTA;  ENDOVASCULAR REPAIR ANEURYSM ABDOMINAL AORTA;  Surgeon: Milton Cazares MD;  Location:  OR     Fistulotomy with marsupialization for repair of fisture in ano  2007     IR ABDOMINAL AORTOGRAM  3/11/2019     IR VISCERAL EMBOLIZATION  5/13/2019     Multiple skin tags - resection  1998     Squamous cell skin cancer resection - back  1985-90     VASECTOMY         ALLERGIES:   Allergies   Allergen Reactions     Ace Inhibitors Cough     cough  cough        CURRENT MEDICATIONS:  Current Outpatient Medications   Medication     amLODIPine (NORVASC) 5 MG tablet     bacitracin 500 UNIT/GM OINT     blood glucose (CONTOUR NEXT TEST) test strip     blood glucose monitoring (NO BRAND SPECIFIED) meter device kit     clopidogrel (PLAVIX) 75 MG tablet     dulaglutide (TRULICITY) 1.5 MG/0.5ML pen     insulin pen needle (BD SHRUTHI U/F) 32G X 4 MM miscellaneous     metoprolol succinate ER (TOPROL XL) 100 MG 24 hr tablet     nitroGLYcerin (NITROSTAT) 0.4 MG sublingual tablet     rosuvastatin (CRESTOR) 20 MG tablet     insulin glargine (LANTUS PEN) 100 UNIT/ML pen     No current facility-administered  medications for this visit.         FAMILY HISTORY:   Family History   Problem Relation Age of Onset     Cerebrovascular Disease Father         x 2     Hypertension Mother      C.A.D. Mother      Cancer Mother         skin     Eye Disorder Mother         glaucoma     Prostate Cancer No family hx of      Cancer - colorectal No family hx of      Glaucoma No family hx of      Macular Degeneration No family hx of      The family history was reviewed and is not pertinent to the patient's disease/illness      SOCIAL HISTORY:   Social History     Socioeconomic History     Marital status:      Spouse name: librado     Number of children: 0     Years of education: 17     Highest education level: Not on file   Occupational History     Occupation: retired   Tobacco Use     Smoking status: Former Smoker     Packs/day: 0.50     Years: 20.00     Pack years: 10.00     Quit date: 1975     Years since quittin.5     Smokeless tobacco: Former User   Substance and Sexual Activity     Alcohol use: Yes     Alcohol/week: 10.0 - 14.0 standard drinks     Types: 10 - 14 Standard drinks or equivalent per week     Comment: 3 drinks a day/wine     Drug use: No     Sexual activity: Never   Other Topics Concern     Parent/sibling w/ CABG, MI or angioplasty before 65F 55M? Not Asked   Social History Narrative     Not on file     Social Determinants of Health     Financial Resource Strain: Not on file   Food Insecurity: Not on file   Transportation Needs: Not on file   Physical Activity: Not on file   Stress: Not on file   Social Connections: Not on file   Intimate Partner Violence: Not At Risk     Fear of Current or Ex-Partner: No     Emotionally Abused: No     Physically Abused: No     Sexually Abused: No   Housing Stability: Not on file         REVIEW OF SYSTEMS:  Review of Systems - Negative except shortness of breath  A complete 10 point review of systems was obtained and is negative other than the above stated  "complaints    PHYSICAL EXAMINATION:   Vitals: BP (!) 149/82   Pulse 78   Ht 1.753 m (5' 9\")   Wt 96.2 kg (212 lb)   SpO2 98%   BMI 31.31 kg/m    GENERAL:  Well developed and well nourished   HEENT: conjunctiva anicteric and sclera clear   CARDIOVASCULAR: normal sinus rhythm  RESPIRATIONS: breathing comfortably, no audible wheezing  ABDOMEN: soft, non distended  EXTREMITIES: no deformity, mild lower ext swelling  PSYCHIATRIC: alert and oriented, pleasant     LABORATORY STUDIES:   Lab Results   Component Value Date    WBC 6.4 06/29/2022    HGB 12.6 (L) 06/29/2022    HCT 37.1 (L) 06/29/2022    MCV 91 06/29/2022    PLT 66 (L) 06/29/2022      Lab Results   Component Value Date    BUN 24 06/29/2022     06/29/2022    CO2 26 06/29/2022     CARDIAC CATHETERIZATION: This study was personally reviewed by me.    TRANSTHORACIC ECHOCARDIOGRAM: pending      "

## 2022-07-15 ENCOUNTER — MYC MEDICAL ADVICE (OUTPATIENT)
Dept: PEDIATRICS | Facility: CLINIC | Age: 82
End: 2022-07-15

## 2022-08-01 ENCOUNTER — PATIENT OUTREACH (OUTPATIENT)
Dept: ONCOLOGY | Facility: CLINIC | Age: 82
End: 2022-08-01

## 2022-08-01 NOTE — PROGRESS NOTES
"Austin called in to clinic reporting he just found a medication bottle of Eliquis in his drawer and he is asking if he should be taking it because he has not been taking it.     Per chart review, Dr. Perry's most recent note from 06/09/22 stated:    \"In light of his recent issues with atrial fibrillation and now on Eliquis, his ibrutinib has been on hold since August 2021.  Considering his CLL is under good control, his WBC is actually on the lower end of normal, and with risk of atrial fibrillation and bleeding on anticoagulants, we will continue to hold ibrutinib for now and monitor his counts.  He will likely need to resume treatment at some point, but we can follow the counts and we could also consider other treatments as well for CLL.      -continue to hold ibrutinib  -he has not had frequent or recent infections  -RTC in 3-4 months for follow-up and labs: CBC/diff, CMP, LDH. He will transfer to a new hematologist after I move.\"      Per chart review, Eliquis was discontinued from his med list in Sept 2021 and then Plavix was prescribed by Dr. Schaffer Sept 2021. Writer encouraged Austin to contact cardiology to confirm he should only be on Plavix. Writer explained per Dr. Perry's last note she was aware he was holding his ibrutinib while on anticoagulation. Austin verbalized understanding and confirmed his future appts. He will contact cardiology to advise on his anticoagulation for his atrial fibrillation.     Tosha Burris, RN, BSN, GARIMA  RN Care Coordinator  [facility]  Ph: [phone]  F: [phone]      "

## 2022-08-02 ENCOUNTER — LAB (OUTPATIENT)
Dept: LAB | Facility: CLINIC | Age: 82
End: 2022-08-02
Payer: COMMERCIAL

## 2022-08-02 ENCOUNTER — OFFICE VISIT (OUTPATIENT)
Dept: PHARMACY | Facility: CLINIC | Age: 82
End: 2022-08-02
Payer: COMMERCIAL

## 2022-08-02 VITALS
HEART RATE: 79 BPM | WEIGHT: 210.7 LBS | OXYGEN SATURATION: 98 % | BODY MASS INDEX: 31.11 KG/M2 | DIASTOLIC BLOOD PRESSURE: 74 MMHG | SYSTOLIC BLOOD PRESSURE: 154 MMHG

## 2022-08-02 DIAGNOSIS — G47.9 SLEEP DISORDER: ICD-10-CM

## 2022-08-02 DIAGNOSIS — Z79.4 TYPE 2 DIABETES MELLITUS WITH OTHER SPECIFIED COMPLICATION, WITH LONG-TERM CURRENT USE OF INSULIN (H): ICD-10-CM

## 2022-08-02 DIAGNOSIS — R79.82 ELEVATED C-REACTIVE PROTEIN (CRP): ICD-10-CM

## 2022-08-02 DIAGNOSIS — R97.20 ELEVATED PROSTATE SPECIFIC ANTIGEN (PSA): ICD-10-CM

## 2022-08-02 DIAGNOSIS — I48.91 ATRIAL FIBRILLATION, UNSPECIFIED TYPE (H): ICD-10-CM

## 2022-08-02 DIAGNOSIS — E11.69 TYPE 2 DIABETES MELLITUS WITH OTHER SPECIFIED COMPLICATION, WITH LONG-TERM CURRENT USE OF INSULIN (H): ICD-10-CM

## 2022-08-02 DIAGNOSIS — E11.649 TYPE 2 DIABETES MELLITUS WITH HYPOGLYCEMIA WITHOUT COMA, WITHOUT LONG-TERM CURRENT USE OF INSULIN (H): ICD-10-CM

## 2022-08-02 DIAGNOSIS — I10 HYPERTENSION GOAL BP (BLOOD PRESSURE) < 140/90: Primary | ICD-10-CM

## 2022-08-02 DIAGNOSIS — E78.5 HYPERLIPIDEMIA LDL GOAL <100: ICD-10-CM

## 2022-08-02 DIAGNOSIS — I71.40 ABDOMINAL AORTIC ANEURYSM (AAA) WITHOUT RUPTURE (H): ICD-10-CM

## 2022-08-02 DIAGNOSIS — R73.9 HYPERGLYCEMIA: ICD-10-CM

## 2022-08-02 DIAGNOSIS — Z71.85 VACCINE COUNSELING: ICD-10-CM

## 2022-08-02 LAB
ALBUMIN SERPL-MCNC: 3.8 G/DL (ref 3.4–5)
ALP SERPL-CCNC: 62 U/L (ref 40–150)
ALT SERPL W P-5'-P-CCNC: 38 U/L (ref 0–70)
ANION GAP SERPL CALCULATED.3IONS-SCNC: 10 MMOL/L (ref 3–14)
AST SERPL W P-5'-P-CCNC: 18 U/L (ref 0–45)
BILIRUB SERPL-MCNC: 0.7 MG/DL (ref 0.2–1.3)
BUN SERPL-MCNC: 20 MG/DL (ref 7–30)
CALCIUM SERPL-MCNC: 9 MG/DL (ref 8.5–10.1)
CHLORIDE BLD-SCNC: 103 MMOL/L (ref 94–109)
CO2 SERPL-SCNC: 24 MMOL/L (ref 20–32)
CREAT SERPL-MCNC: 1.35 MG/DL (ref 0.66–1.25)
CRP SERPL-MCNC: 6.27 MG/L
GFR SERPL CREATININE-BSD FRML MDRD: 53 ML/MIN/1.73M2
GLUCOSE BLD-MCNC: 249 MG/DL (ref 70–99)
HBA1C MFR BLD: 6.4 % (ref 0–5.6)
POTASSIUM BLD-SCNC: 3.9 MMOL/L (ref 3.4–5.3)
PROT SERPL-MCNC: 6.4 G/DL (ref 6.8–8.8)
SODIUM SERPL-SCNC: 137 MMOL/L (ref 133–144)

## 2022-08-02 PROCEDURE — 36415 COLL VENOUS BLD VENIPUNCTURE: CPT

## 2022-08-02 PROCEDURE — 99607 MTMS BY PHARM ADDL 15 MIN: CPT | Performed by: PHARMACIST

## 2022-08-02 PROCEDURE — 83036 HEMOGLOBIN GLYCOSYLATED A1C: CPT

## 2022-08-02 PROCEDURE — 80053 COMPREHEN METABOLIC PANEL: CPT

## 2022-08-02 PROCEDURE — 86140 C-REACTIVE PROTEIN: CPT

## 2022-08-02 PROCEDURE — 99606 MTMS BY PHARM EST 15 MIN: CPT | Performed by: PHARMACIST

## 2022-08-02 RX ORDER — AMLODIPINE BESYLATE 5 MG/1
7.5 TABLET ORAL DAILY
Qty: 135 TABLET | Refills: 0 | Status: SHIPPED | OUTPATIENT
Start: 2022-08-02 | End: 2022-10-06

## 2022-08-02 NOTE — PATIENT INSTRUCTIONS
"Recommendations from today's MTM visit:                                                      Continue Trulicity 1.5 mg weekly. Stay off insulin.     Agree do not need to check blood sugar all the time, but you should when you feel unwell.     Melatonin 10-12 mg total per dose at night. You can try extended release melatonin tablet to see if this can extend your sleep duration better. If no benefit in the next month, then I would agree set up that sleep referral appointment.    Avoid opening and putting nitroglycerin in the zip lock. Keep it in the bottle. Call the pharmacy and renew the Rx.     Please go to the pharmacy to get the Shingrix vaccine, which helps prevent the risk of getting shingles and the nerve pain if you have shingles. After the first shot, you will need the second shot to complete the series 2-6 months later.     Increase amlodipine 5mg tablet, to 1.5 tablet = 7.5 mg once a day. Klaudia will mychart you in 2 week for a blood pressure reading. If you have dizziness issues, stop the 0.5 tablet and send Klaudia a message.      Trulicity:  - unfortunately it is a brand name no generic. So the cost will be high until you reach the catastrophic state.    Follow-up: 6 months    It was great speaking with you today.  I value your experience and would be very thankful for your time in providing feedback in our clinic survey. In the next few days, you may receive an email or text message from [employer] with a link to a survey related to your  clinical pharmacist.\"     To schedule another MTM appointment, please call the clinic directly or you may call the MTM scheduling line at [phone] or toll-free at [phone].     My Clinical Pharmacist's contact information:                                                      Please feel free to contact me with any questions or concerns you have.      Klaudia Santa, PharmGINNY  Medication Therapy Management Pharmacist    "

## 2022-08-02 NOTE — PROGRESS NOTES
Medication Therapy Management (MTM) Encounter    ASSESSMENT:                            Medication Adherence/Access: No issues identified    Type 2 Diabetes:  Patient is meeting A1c goal of < 7%. No change to therapy.    HTN/Afib/Lipids/AAA (repair in 2017): Patients blood pressure is not at goal of < 140/90 mmHg. Recommend increase in CCB. He did not tolerate 10mg in the past due to increased balance issues, will increase slow by 2.5mg and follow-up in a few weeks.  Continue working with cardiology as well.  High bleed risk - agree with hold of DOAC.    BPH: stable.    Insomnia: needs improvement, we discussed with his concern of memory issues I would be cautious around sleep medication. He can try an extended release melatonin and monitor for any additional benefit. Recommend he follow-up on the referral for sleep medicine.    Vaccines: candidate for Shingrix.    PLAN:                            Continue Trulicity 1.5 mg weekly. Stay off insulin.   - re: patient's cost inquiry. I offered may apply for assistance program patient declines.    Patient to monitor blood sugar if feeling symptomatic is reasonable, suggest a1c q3 month.    Patient may take melatonin 10-12 mg total per dose at night. Patient to try extended release melatonin. If no benefit, then encouraged patient to set up sleep referral as directed by primary care provider.    Patient was advised to avoid opening and putting nitroglycerin in the zip lock. Keep it in the bottle. Call the pharmacy and renew the Rx.     Patient plans to check next year, due to cost concern this year. I advised pt go to the pharmacy to get the Shingrix vaccine. (previously discussed MTP from 2021).    Increase amlodipine 5mg tablet, to 1.5 tablet = 7.5 mg once a day.     Follow-up:   - Klaudia will mychart you in 2 week for a blood pressure reading. If you have dizziness issues, stop the 0.5 tablet and send Klaudia a message when that happens.  - Otherwise follow-up 6 month  appointment.      SUBJECTIVE/OBJECTIVE:                          Austin Garza is a 82 year old male coming in for a follow-up visit.  Today's visit is a follow-up MTM visit from 5/3/2022.      Reason for visit: medication review.    Allergies/ADRs: Reviewed in chart  Past Medical History: Reviewed in chart  Tobacco: He reports that he quit smoking about 47 years ago. He has a 10.00 pack-year smoking history. He has quit using smokeless tobacco.  Alcohol: not currently using regularly    Medication Adherence/Access: No issues reported. Managing his medications on his own. [facility]ills [facility]       Type 2 Diabetes: Currently taking Trulicity 1.5 mg weekly. He confirms no longer taking Lantus. He reports enjoys the Trulicity over insulin and wants to verify with me if his is accurately rotating injection sites, which he is. He reports his wife wanted him to ask if there are lower cost alternatives to Trulicity. Patient is in the doughnut hole.  Side effects: None.  History of reflux issues, no complaints today.  Blood sugar readings: not checking regularly anymore, burdensome.  Lab Results   Component Value Date    A1C 6.1 06/22/2022    A1C 6.2 02/15/2022    A1C 7.0 07/30/2021    A1C 5.9 04/05/2021    A1C 6.7 01/15/2021    A1C 5.6 08/23/2020    A1C 5.5 07/14/2020    A1C 6.3 12/31/2019       HTN/Afib/Lipids/AAA (repair in 2017):  No hypotension concerns, but does feel he has balance issues. No falls. No blood pressure log today, but reports home blood pressure readings range 130-150s / 70-80s. Today's reading is high and he does feel his home readings are similar.   Last Summer was his last bout of chest pain/flutter sensation. Denies any reoccurrence.   Holding DOAC (history of Eliquis 5mg twice daily) due to concern for bleed (history of hematuria, thrombocytopenia and rectal bleed), but AFib [id number] > 2.  Medications:  - metoprolol  mg daily   - rosuvastatin 20 mg daily   -  clopidogrel 75 mg daily for AAA   - amlodipine 5 mg daily   BP Readings from Last 3 Encounters:   08/02/22 (!) 154/74   07/12/22 (!) 149/82   07/08/22 (!) 156/84     Hemoglobin   Date Value Ref Range Status   06/29/2022 12.6 (L) 13.3 - 17.7 g/dL Final   06/01/2021 13.5 13.3 - 17.7 g/dL Final     WBC   Date Value Ref Range Status   06/01/2021 12.7 (H) 4.0 - 11.0 10e9/L Final     WBC Count   Date Value Ref Range Status   06/29/2022 6.4 4.0 - 11.0 10e3/uL Final     Platelet Count   Date Value Ref Range Status   06/29/2022 66 (L) 150 - 450 10e3/uL Final   06/01/2021 119 (L) 150 - 450 10e9/L Final     Recent Labs   Lab Test 02/15/22  0734 04/05/21  0913 11/17/16  0731 10/23/15  0903 08/05/14  0755   CHOL 120 166   < > 109 131   HDL 33* 57   < > 43 47   LDL 58 96   < > 54 63   TRIG 147 64   < > 62 105   CHOLHDLRATIO  --   --   --  2.5 2.8    < > = values in this interval not displayed.       BPH: Currently taking none. He admits may have some dribble which he notices when his bladder is empty vs full. He reports not as nearly as bad as before and feels pharmacotherapy is not necessary at this time.   History of hematuria and has been seen by urology at [place].  PSA   Date Value Ref Range Status   12/31/2019 5.49 (H) 0 - 4 ug/L Final     Comment:     Assay Method:  Chemiluminescence using Siemens Vista analyzer       Insomnia: no issues with falling asleep initially, but issue is waking up at 11p-12a. Then he goes to his couch and falls back asleep but he wakes up again at 2am again and then cannot fall asleep. He ends up waking up to read his book or plays on his Ipad. Then when he gets tired will stop and go to bed but usually only sleep for another 2 hours. Denies any noticing any specific reason to cause him to wake up such as pain or needs to urinate.   Taking over-the-counter melatonin has help predict his sleep cycle/waking better as outlined above but use not sleeping through the night. He has been taking 10mg at  bedtime. Dr. Munson did place a sleep referral he has not set up.    Vaccines:  Most Recent Immunizations   Administered Date(s) Administered     COVID-19,PF,Pfizer (12+ Yrs) 08/15/2021     COVID-19,PF,Pfizer 12+ Yrs (2022 and After) 05/22/2022     DT (PEDS <7y) 10/07/1996     Flu 65+ Years 11/03/2010     Flu, Unspecified 10/21/1998     HepB-Adult 12/31/1992     Influenza (High Dose) 3 valent vaccine 10/01/2019     Influenza (IIV3) PF 11/03/2010     Influenza Vaccine IM > 6 months Valent IIV4 (Alfuria,Fluzone) 09/29/2021     Influenza, Quad, High Dose, Pf, 65yr+ (Fluzone HD) 09/30/2020     Pneumo Conj 13-V (2010&after) 06/26/2015     Pneumococcal 23 valent 07/16/2010     TD (ADULT, 7+) 10/05/2012     Td (Adult), Adsorbed 10/05/2012     Tetanus 07/31/2001     Zoster vaccine, live 08/12/2010       Today's Vitals: BP (!) 154/74   Pulse 79   Wt 210 lb 11.2 oz (95.6 kg)   SpO2 98%   BMI 31.11 kg/m    ----------------      I spent 37 minutes with this patient today. All changes were made via collaborative practice agreement with Vanessa Munson MD. A copy of the visit note was provided to the patient's provider(s).    The patient was given a summary of these recommendations.     Klaudia Santa, PharmD  Medication Therapy Management Pharmacist     Medication Therapy Recommendations  Elevated prostate specific antigen (PSA)    Current Medication: dutasteride (AVODART) 0.5 MG capsule (Discontinued)   Rationale: Does not understand instructions - Adherence - Adherence   Recommendation: Provide Adherence Intervention   Status: Accepted per [facility]         Hypertension goal BP (blood pressure) < 140/90    Current Medication: amLODIPine (NORVASC) 5 MG tablet   Rationale: Dose too low - Dosage too low - Effectiveness   Recommendation: Increase Dose   Status: Accepted per [facility]         Vaccine counseling    Rationale: Preventive therapy - Needs additional medication therapy - Indication   Recommendation: Provide Education - go to  pharmacy get Shingrix   Status: Patient Agreed - Adherence/Education

## 2022-08-03 RX ORDER — MAGNESIUM OXIDE 500 MG
5-10 TABLET ORAL
COMMUNITY
End: 2022-11-28

## 2022-08-03 RX ORDER — DULAGLUTIDE 1.5 MG/.5ML
1.5 INJECTION, SOLUTION SUBCUTANEOUS
Qty: 6 ML | Refills: 3 | Status: SHIPPED | OUTPATIENT
Start: 2022-08-03 | End: 2023-03-07 | Stop reason: DRUGHIGH

## 2022-08-03 RX ORDER — ROSUVASTATIN CALCIUM 20 MG/1
20 TABLET, COATED ORAL DAILY
Qty: 90 TABLET | Refills: 3 | Status: ON HOLD | OUTPATIENT
Start: 2022-08-03 | End: 2023-01-01

## 2022-09-13 ENCOUNTER — LAB (OUTPATIENT)
Dept: ONCOLOGY | Facility: CLINIC | Age: 82
End: 2022-09-13
Attending: INTERNAL MEDICINE
Payer: COMMERCIAL

## 2022-09-13 DIAGNOSIS — C91.10 CLL (CHRONIC LYMPHOCYTIC LEUKEMIA) (H): ICD-10-CM

## 2022-09-13 LAB
ALBUMIN SERPL BCG-MCNC: 3.9 G/DL (ref 3.5–5.2)
ALP SERPL-CCNC: 67 U/L (ref 40–129)
ALT SERPL W P-5'-P-CCNC: 44 U/L (ref 10–50)
ANION GAP SERPL CALCULATED.3IONS-SCNC: 11 MMOL/L (ref 7–15)
AST SERPL W P-5'-P-CCNC: 30 U/L (ref 10–50)
BASOPHILS # BLD AUTO: 0 10E3/UL (ref 0–0.2)
BASOPHILS NFR BLD AUTO: 0 %
BILIRUB SERPL-MCNC: 0.6 MG/DL
BUN SERPL-MCNC: 21 MG/DL (ref 8–23)
CALCIUM SERPL-MCNC: 8.9 MG/DL (ref 8.8–10.2)
CHLORIDE SERPL-SCNC: 100 MMOL/L (ref 98–107)
CREAT SERPL-MCNC: 1.44 MG/DL (ref 0.67–1.17)
DEPRECATED HCO3 PLAS-SCNC: 25 MMOL/L (ref 22–29)
EOSINOPHIL # BLD AUTO: 0.1 10E3/UL (ref 0–0.7)
EOSINOPHIL NFR BLD AUTO: 1 %
ERYTHROCYTE [DISTWIDTH] IN BLOOD BY AUTOMATED COUNT: 14.1 % (ref 10–15)
GFR SERPL CREATININE-BSD FRML MDRD: 49 ML/MIN/1.73M2
GLUCOSE SERPL-MCNC: 296 MG/DL (ref 70–99)
HCT VFR BLD AUTO: 36.6 % (ref 40–53)
HGB BLD-MCNC: 11.9 G/DL (ref 13.3–17.7)
IMM GRANULOCYTES # BLD: 0 10E3/UL
IMM GRANULOCYTES NFR BLD: 0 %
LDH SERPL L TO P-CCNC: 172 U/L (ref 0–250)
LYMPHOCYTES # BLD AUTO: 3.5 10E3/UL (ref 0.8–5.3)
LYMPHOCYTES NFR BLD AUTO: 49 %
MCH RBC QN AUTO: 30.8 PG (ref 26.5–33)
MCHC RBC AUTO-ENTMCNC: 32.5 G/DL (ref 31.5–36.5)
MCV RBC AUTO: 95 FL (ref 78–100)
MONOCYTES # BLD AUTO: 0.4 10E3/UL (ref 0–1.3)
MONOCYTES NFR BLD AUTO: 6 %
NEUTROPHILS # BLD AUTO: 3.1 10E3/UL (ref 1.6–8.3)
NEUTROPHILS NFR BLD AUTO: 44 %
NRBC # BLD AUTO: 0 10E3/UL
NRBC BLD AUTO-RTO: 0 /100
PLATELET # BLD AUTO: 70 10E3/UL (ref 150–450)
POTASSIUM SERPL-SCNC: 4.2 MMOL/L (ref 3.4–5.3)
PROT SERPL-MCNC: 6.1 G/DL (ref 6.4–8.3)
RBC # BLD AUTO: 3.86 10E6/UL (ref 4.4–5.9)
SODIUM SERPL-SCNC: 136 MMOL/L (ref 136–145)
WBC # BLD AUTO: 7.2 10E3/UL (ref 4–11)

## 2022-09-13 PROCEDURE — 36415 COLL VENOUS BLD VENIPUNCTURE: CPT

## 2022-09-13 PROCEDURE — 83615 LACTATE (LD) (LDH) ENZYME: CPT | Performed by: INTERNAL MEDICINE

## 2022-09-13 PROCEDURE — 85004 AUTOMATED DIFF WBC COUNT: CPT | Performed by: INTERNAL MEDICINE

## 2022-09-13 PROCEDURE — 80053 COMPREHEN METABOLIC PANEL: CPT | Performed by: INTERNAL MEDICINE

## 2022-09-22 NOTE — PROGRESS NOTES
[facility]    Hematology/Oncology Established Patient Note      Today's Date: 9/23/22    Reason for Follow-up: CLL      HISTORY OF PRESENT ILLNESS: Austin Garza is an 82 year old male with CLL previously on ibrutinib and followed by Dr. Perry who has since relocated.     He has PMHx of DMII, HTN who presented with leukocytosis.  In November 2017, he was found to have elevated WBC of 61.7K, hemoglobin of 10.4, and platelet of 115K.  There were no other CBC results available between 2010 and 2017.  Prior to 2010, he had normal CBC, with normal to mild anemia, and mild thrombocytopenia.   He was asymptomatic.    He underwent bone marrow biopsy on 11/21/17, which was consistent with chronic lymphocytic leukemia/small lymphocytic lymphoma, with 13% ringed sideroblasts identified.  The presence of increased numbers of ringed sideroblasts raises the possibility of an associated myelodysplastic syndrome.  Dysplastic changes are not identified though.  Many cases exhibiting ringed sideroblasts are metabolic origin, without significant risk of progression to acute leukemia, and these typically do not show dysplastic morphology as is the case with this specimen.  15% ringed sideroblasts are required for a diagnosis for RARS, which this specimen does not meet.  Flow cytometry is also consistent with CLL/SLL.  Cytogenetics show two (10%) of the metaphase cells comprise a clone characterized by a deletion within the proximal long arm of a chromosome 13 as the sole karyotypic abnormality.  Three (15%) metaphases had loss of the Y chromosome as the sole abnormality.    CT scan on 11/29/17 shows mildly enlarged left axillary lymph node and periaortic lymph nodes in the retroperitoneum of the abdomen.  Spleen is also enlarged.    On his staging CT scan for CLL, he was incidentally found to have a large infrarenal abdominal aortic aneurysm, measuring 7.6 cm.  He was seen by Dr. Thomas, and he underwent EVAR  on 12/4/17.     He started ibrutinib on 5/4/20.  It was held 5/28/20-6/4/20 for tooth extraction.    He was hospitalized on 7/30/2021 for shortness of breath, new CHF, A. fib with RVR and ibrutinib was put on hold.  He was seen by cardiology and eventually underwent cardioversion for the A. fib.  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  He was started on Eliquis and metoprolol.  His Plavix was stopped.    He went to the [facility] ED on 9/26/21 and found to have pericardial effusion.  Due to lack of beds, he was transferred to [facility] for acute pericardial effusion and pericarditis.  He was started on colchicine and prednisone taper x 6 weeks.  He was also given Bactrim for PJP prophylaxis due duration of prednisone course.  He was discharged on 10/1/21.        INTERIM HISTORY:   Since his last visit in clinic, he states he has had multiple hospitalizations for back pain. He informs me he has cardiology follow up as he was discovered to have coronary artery disease. He has had angiogram. There are recommendations in regards to CABG vs PCI. He states he is uncertain if he will move forward with this.       REVIEW OF SYSTEMS:   14 point ROS was reviewed and is negative other than as noted above in HPI.       HOME MEDICATIONS:  Current Outpatient Medications   Medication Sig Dispense Refill     amLODIPine (NORVASC) 5 MG tablet Take 1.5 tablets (7.5 mg) by mouth daily 135 tablet 0     blood glucose (CONTOUR NEXT TEST) test strip Use to test blood sugar 3-4 times daily or as directed. 100 strip 6     blood glucose monitoring (NO BRAND SPECIFIED) meter device kit Use to test blood sugar 2 times daily or as directed. 1 kit 0     clopidogrel (PLAVIX) 75 MG tablet Take 1 tablet (75 mg) by mouth daily 90 tablet 3     dulaglutide (TRULICITY) 1.5 MG/0.5ML pen Inject 1.5 mg Subcutaneous every 7 days 6 mL 3     Melatonin ER 5 MG TBCR Take 5-10 mg by mouth nightly as needed       metoprolol succinate  "ER (TOPROL XL) 100 MG 24 hr tablet Take 1 tablet (100 mg) by mouth daily 90 tablet 1     nitroGLYcerin (NITROSTAT) 0.4 MG sublingual tablet One tablet under the tongue every 5 minutes if needed for chest pain. May repeat every 5 minutes for a maximum of 3 doses in 15 minutes\" 25 tablet 3     rosuvastatin (CRESTOR) 20 MG tablet Take 1 tablet (20 mg) by mouth daily 90 tablet 3         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      CLL (chronic lymphocytic leukemia)      Diaphragmatic hernia      Diverticulitis of colon      Esophageal reflux      Essential hypertension      Hyperlipidemia      Irritable bowel syndrome      Macular degeneration (senile) of retina      Mumps      Squamous Cell Carcinoma, Back 1988     Type 2 diabetes mellitus          PAST SURGICAL HISTORY:  Past Surgical History:   Procedure Laterality Date     ANESTHESIA CARDIOVERSION N/A 8/2/2021    Procedure: ANESTHESIA, FOR CARDIOVERSION;  Surgeon: GENERIC ANESTHESIA PROVIDER;  Location: [facility] OR     APPENDECTOMY OPEN  1966     BONE MARROW BIOPSY, BONE SPECIMEN, NEEDLE/TROCAR N/A 11/21/2017    Procedure: BIOPSY BONE MARROW;  BONE MARROW BIOPSY;  Surgeon: Shady Garcia MD;  Location:  GI     COLONOSCOPY  2002     CV CORONARY ANGIOGRAM N/A 6/29/2022    Procedure: Coronary Angiogram;  Surgeon: Evaristo Beaulieu MD;  Location:  HEART CARDIAC CATH LAB     ENDOVASCULAR REPAIR ANEURYSM ABDOMINAL AORTA N/A 12/4/2017    Procedure: ENDOVASCULAR REPAIR ANEURYSM ABDOMINAL AORTA;  ENDOVASCULAR REPAIR ANEURYSM ABDOMINAL AORTA;  Surgeon: Milton Cazares MD;  Location:  OR     Fistulotomy with marsupialization for repair of fisture in ano  2007     IR ABDOMINAL AORTOGRAM  3/11/2019     IR VISCERAL EMBOLIZATION  5/13/2019     Multiple skin tags - " resection  1998     Squamous cell skin cancer resection - back       VASECTOMY           SOCIAL HISTORY:  Social History     Socioeconomic History     Marital status:      Spouse name: librado     Number of children: 0     Years of education: 17     Highest education level: Not on file   Occupational History     Occupation: retired   Tobacco Use     Smoking status: Former Smoker     Packs/day: 0.50     Years: 20.00     Pack years: 10.00     Quit date: 1975     Years since quittin.7     Smokeless tobacco: Former User   Substance and Sexual Activity     Alcohol use: Yes     Alcohol/week: 10.0 - 14.0 standard drinks     Types: 10 - 14 Standard drinks or equivalent per week     Comment: 3 drinks a day/wine     Drug use: No     Sexual activity: Never   Other Topics Concern     Parent/sibling w/ CABG, MI or angioplasty before 65F 55M? Not Asked   Social History Narrative     Not on file     Social Determinants of Health     Financial Resource Strain: Not on file   Food Insecurity: Not on file   Transportation Needs: Not on file   Physical Activity: Not on file   Stress: Not on file   Social Connections: Not on file   Intimate Partner Violence: Not At Risk     Fear of Current or Ex-Partner: No     Emotionally Abused: No     Physically Abused: No     Sexually Abused: No   Housing Stability: Not on file   He quit smoking in .  He drinks 1-3 glasses of wine a night with dinner.  He is retired, and used to work as a .  He lives with his wife in [place].  His cousin had lung cancer and  around age 69.  Otherwise, he denies family history of cancer.        FAMILY HISTORY:  Family History   Problem Relation Age of Onset     Cerebrovascular Disease Father         x 2     Hypertension Mother      C.A.D. Mother      Cancer Mother         skin     Eye Disorder Mother         glaucoma     Prostate Cancer No family hx of      Cancer - colorectal No family hx of      Glaucoma No family hx of      Macular  "Degeneration No family hx of          PHYSICAL EXAM:  /76   Pulse 92   Temp 97.4  F (36.3  C) (Tympanic)   Resp 16   Ht 1.753 m (5' 9\")   Wt 96 kg (211 lb 9.6 oz)   SpO2 98%   BMI 31.25 kg/m    ECO  GENERAL/CONSTITUTIONAL: No acute distress.  EYES: No scleral icterus.  NEUROLOGIC: Alert, oriented, answers questions appropriately.  INTEGUMENTARY: No jaundice.    GAIT: Steady, does not use assistive device      LABS:   Latest Reference Range & Units 22 09:17   Sodium 136 - 145 mmol/L 136   Potassium 3.4 - 5.3 mmol/L 4.2   Chloride 98 - 107 mmol/L 100   Carbon Dioxide (CO2) 22 - 29 mmol/L 25   Urea Nitrogen 8.0 - 23.0 mg/dL 21.0   Creatinine 0.67 - 1.17 mg/dL 1.44 (H)   GFR Estimate >60 mL/min/1.73m2 49 (L)   Calcium 8.8 - 10.2 mg/dL 8.9   Anion Gap 7 - 15 mmol/L 11   Albumin 3.5 - 5.2 g/dL 3.9   Protein Total 6.4 - 8.3 g/dL 6.1 (L)   Alkaline Phosphatase 40 - 129 U/L 67   ALT 10 - 50 U/L 44   AST 10 - 50 U/L 30   Bilirubin Total <=1.2 mg/dL 0.6   Glucose 70 - 99 mg/dL 296 (H)   Lactate Dehydrogenase 0 - 250 U/L 172   WBC 4.0 - 11.0 10e3/uL 7.2   Hemoglobin 13.3 - 17.7 g/dL 11.9 (L)   Hematocrit 40.0 - 53.0 % 36.6 (L)   Platelet Count 150 - 450 10e3/uL 70 (L)   RBC Count 4.40 - 5.90 10e6/uL 3.86 (L)   MCV 78 - 100 fL 95   MCH 26.5 - 33.0 pg 30.8   MCHC 31.5 - 36.5 g/dL 32.5   RDW 10.0 - 15.0 % 14.1   % Neutrophils % 44   % Lymphocytes % 49   % Monocytes % 6   % Eosinophils % 1   % Basophils % 0   Absolute Basophils 0.0 - 0.2 10e3/uL 0.0   Absolute Eosinophils 0.0 - 0.7 10e3/uL 0.1   Absolute Immature Granulocytes <=0.4 10e3/uL 0.0   Absolute Lymphocytes 0.8 - 5.3 10e3/uL 3.5   Absolute Monocytes 0.0 - 1.3 10e3/uL 0.4   % Immature Granulocytes % 0   Absolute Neutrophils 1.6 - 8.3 10e3/uL 3.1   Absolute NRBCs 10e3/uL 0.0   NRBCs per 100 WBC <1 /100 0       ASSESSMENT/PLAN:  Austin Garza is an 82 year old male with:    1) CLL/SLL: BLACKWOOD stage III, with lymphocytosis, lymphadenopathy, splenomegaly, " and anemia.  He has mild thrombocytopenia as well, but is above 100K.  He presented with leukocytosis with WBC of 61.7K, hemoglobin of 10.4, and platelet count of 115K on diagnosis.  Bone marrow biopsy and flow cytometry confirmed CLL/SLL.  CT scan shows mildly enlarged left axillary lymph node and periaortic lymph nodes in the retroperitoneum of the abdomen, with enlarged spleen.      Due to worsening lymphocyte count, anemia, thrombocytopenia, as well as fatigue and night sweats, he started ibrutinib on 5/4/20.      His WBC normalized, with stable hemoglobin and platelet count.    In light of his recent issues with atrial fibrillation and now on Eliquis, his ibrutinib has been on hold since August 2021.  Considering his CLL is under good control, his WBC is actually on the lower end of normal, and with risk of atrial fibrillation and bleeding on anticoagulants, we will continue to hold ibrutinib for now and monitor his counts.  He will likely need to resume treatment at some point, but we can follow the counts and we could also consider other treatments as well for CLL.      -Continue to hold ibrutinib.  -He has not had frequent or recent infections.  -CBC stable.    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 He completed radiation at [facility] with planned 60 Gy x 30 fractions. He has completed radiation and noted that he did not need any more follow-up for this.    3) Abdominal aortic aneurysm: measures up to 7.6 cm on CT scan, incidentally found.  He underwent EVAR on 12/4/17.  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  He is on Plavix now.  On 5/13/19, he underwent and percutaneous aneurysm sac puncture and endo leak embolization by IR on 5/13/19.  -Follow-up with vascular surgery and IR.    4) Diabetes, hypertension:    -Following with PCP.    5) Atrial fibrillation with RVR s/p successful DCCV to  SR:  -On metoprolol and Eliquis.  -Following with cardiology.    6) CAD:   -Follow-up with cardiology  -There are continued discussions in regards to PCI/CABG. He is uncertain if he is following with [facility] or locally.    7) Thrombocytopenia: stable.  It had decreased after his [facility] hospitalization, may have been from his acute illness and Bactrim.  Platelet count is around his baseline now.  -Monitor CBC.    8) RTC in 4 months for follow-up and labs: CBC/diff, CMP, LDH.       Miriam Badillo DO  Hematology/Oncology  [facility]

## 2022-09-23 ENCOUNTER — ONCOLOGY VISIT (OUTPATIENT)
Dept: ONCOLOGY | Facility: CLINIC | Age: 82
End: 2022-09-23
Attending: INTERNAL MEDICINE
Payer: COMMERCIAL

## 2022-09-23 VITALS
RESPIRATION RATE: 16 BRPM | HEIGHT: 69 IN | SYSTOLIC BLOOD PRESSURE: 123 MMHG | TEMPERATURE: 97.4 F | WEIGHT: 211.6 LBS | OXYGEN SATURATION: 98 % | DIASTOLIC BLOOD PRESSURE: 76 MMHG | BODY MASS INDEX: 31.34 KG/M2 | HEART RATE: 92 BPM

## 2022-09-23 DIAGNOSIS — C91.10 CLL (CHRONIC LYMPHOCYTIC LEUKEMIA) (H): Primary | ICD-10-CM

## 2022-09-23 PROCEDURE — G0463 HOSPITAL OUTPT CLINIC VISIT: HCPCS

## 2022-09-23 PROCEDURE — 99214 OFFICE O/P EST MOD 30 MIN: CPT | Performed by: INTERNAL MEDICINE

## 2022-09-23 ASSESSMENT — PAIN SCALES - GENERAL: PAINLEVEL: NO PAIN (0)

## 2022-09-23 NOTE — NURSING NOTE
"Oncology Rooming Note    September 23, 2022 8:37 AM   Austin Garza is a 82 year old male who presents for:    Chief Complaint   Patient presents with     Oncology Clinic Visit     Chronic lymphocytic leukemia (CLL), B-cell (H)         Initial Vitals: /76   Pulse 92   Temp 97.4  F (36.3  C) (Tympanic)   Resp 16   Ht 1.753 m (5' 9\")   Wt 96 kg (211 lb 9.6 oz)   SpO2 98%   BMI 31.25 kg/m   Estimated body mass index is 31.25 kg/m  as calculated from the following:    Height as of this encounter: 1.753 m (5' 9\").    Weight as of this encounter: 96 kg (211 lb 9.6 oz). Body surface area is 2.16 meters squared.  No Pain (0) Comment: Data Unavailable   No LMP for male patient.  Allergies reviewed: Yes  Medications reviewed: Yes    Medications: Medication refills not needed today.  Pharmacy name entered into [facility]: [id number] - [place], [place] - [id number] AT [facility]    Clinical concerns: follow up       Molly Olivo CMA              "

## 2022-09-23 NOTE — LETTER
9/23/2022         RE: Austin Garza  [address] [place] [address]        Dear Colleague,    Thank you for referring your patient, Austin Garza, to the [facility]. Please see a copy of my visit note below.    [facility]    Hematology/Oncology Established Patient Note      Today's Date: 9/23/22    Reason for Follow-up: CLL      HISTORY OF PRESENT ILLNESS: Austin Garza is an 82 year old male with CLL previously on ibrutinib and followed by Dr. Perry who has since relocated.     He has PMHx of DMII, HTN who presented with leukocytosis.  In November 2017, he was found to have elevated WBC of 61.7K, hemoglobin of 10.4, and platelet of 115K.  There were no other CBC results available between 2010 and 2017.  Prior to 2010, he had normal CBC, with normal to mild anemia, and mild thrombocytopenia.   He was asymptomatic.    He underwent bone marrow biopsy on 11/21/17, which was consistent with chronic lymphocytic leukemia/small lymphocytic lymphoma, with 13% ringed sideroblasts identified.  The presence of increased numbers of ringed sideroblasts raises the possibility of an associated myelodysplastic syndrome.  Dysplastic changes are not identified though.  Many cases exhibiting ringed sideroblasts are metabolic origin, without significant risk of progression to acute leukemia, and these typically do not show dysplastic morphology as is the case with this specimen.  15% ringed sideroblasts are required for a diagnosis for RARS, which this specimen does not meet.  Flow cytometry is also consistent with CLL/SLL.  Cytogenetics show two (10%) of the metaphase cells comprise a clone characterized by a deletion within the proximal long arm of a chromosome 13 as the sole karyotypic abnormality.  Three (15%) metaphases had loss of the Y chromosome as the sole abnormality.    CT scan on 11/29/17 shows mildly enlarged left axillary lymph node and  periaortic lymph nodes in the retroperitoneum of the abdomen.  Spleen is also enlarged.    On his staging CT scan for CLL, he was incidentally found to have a large infrarenal abdominal aortic aneurysm, measuring 7.6 cm.  He was seen by Dr. Thomas, and he underwent EVAR on 12/4/17.     He started ibrutinib on 5/4/20.  It was held 5/28/20-6/4/20 for tooth extraction.    He was hospitalized on 7/30/2021 for shortness of breath, new CHF, A. fib with RVR and ibrutinib was put on hold.  He was seen by cardiology and eventually underwent cardioversion for the A. fib.  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  He was started on Eliquis and metoprolol.  His Plavix was stopped.    He went to the [facility] ED on 9/26/21 and found to have pericardial effusion.  Due to lack of beds, he was transferred to [facility] for acute pericardial effusion and pericarditis.  He was started on colchicine and prednisone taper x 6 weeks.  He was also given Bactrim for PJP prophylaxis due duration of prednisone course.  He was discharged on 10/1/21.        INTERIM HISTORY:   Since his last visit in clinic, he states he has had multiple hospitalizations for back pain. He informs me he has cardiology follow up as he was discovered to have coronary artery disease. He has had angiogram. There are recommendations in regards to CABG vs PCI. He states he is uncertain if he will move forward with this.       REVIEW OF SYSTEMS:   14 point ROS was reviewed and is negative other than as noted above in HPI.       HOME MEDICATIONS:  Current Outpatient Medications   Medication Sig Dispense Refill     amLODIPine (NORVASC) 5 MG tablet Take 1.5 tablets (7.5 mg) by mouth daily 135 tablet 0     blood glucose (CONTOUR NEXT TEST) test strip Use to test blood sugar 3-4 times daily or as directed. 100 strip 6     blood glucose monitoring (NO BRAND SPECIFIED) meter device kit Use to test blood sugar 2 times daily or as directed. 1 kit 0  "    clopidogrel (PLAVIX) 75 MG tablet Take 1 tablet (75 mg) by mouth daily 90 tablet 3     dulaglutide (TRULICITY) 1.5 MG/0.5ML pen Inject 1.5 mg Subcutaneous every 7 days 6 mL 3     Melatonin ER 5 MG TBCR Take 5-10 mg by mouth nightly as needed       metoprolol succinate ER (TOPROL XL) 100 MG 24 hr tablet Take 1 tablet (100 mg) by mouth daily 90 tablet 1     nitroGLYcerin (NITROSTAT) 0.4 MG sublingual tablet One tablet under the tongue every 5 minutes if needed for chest pain. May repeat every 5 minutes for a maximum of 3 doses in 15 minutes\" 25 tablet 3     rosuvastatin (CRESTOR) 20 MG tablet Take 1 tablet (20 mg) by mouth daily 90 tablet 3         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      CLL (chronic lymphocytic leukemia)      Diaphragmatic hernia      Diverticulitis of colon      Esophageal reflux      Essential hypertension      Hyperlipidemia      Irritable bowel syndrome      Macular degeneration (senile) of retina      Mumps      Squamous Cell Carcinoma, Back 1988     Type 2 diabetes mellitus          PAST SURGICAL HISTORY:  Past Surgical History:   Procedure Laterality Date     ANESTHESIA CARDIOVERSION N/A 8/2/2021    Procedure: ANESTHESIA, FOR CARDIOVERSION;  Surgeon: GENERIC ANESTHESIA PROVIDER;  Location: [facility] OR     APPENDECTOMY OPEN  1966     BONE MARROW BIOPSY, BONE SPECIMEN, NEEDLE/TROCAR N/A 11/21/2017    Procedure: BIOPSY BONE MARROW;  BONE MARROW BIOPSY;  Surgeon: Shady Garcia MD;  Location:  GI     COLONOSCOPY  2002     CV CORONARY ANGIOGRAM N/A 6/29/2022    Procedure: Coronary Angiogram;  Surgeon: Evaristo Beaulieu MD;  Location:  HEART CARDIAC CATH LAB     ENDOVASCULAR REPAIR ANEURYSM ABDOMINAL AORTA N/A 12/4/2017    Procedure: ENDOVASCULAR REPAIR ANEURYSM ABDOMINAL AORTA;  " ENDOVASCULAR REPAIR ANEURYSM ABDOMINAL AORTA;  Surgeon: Milton Cazares MD;  Location: [facility] OR     Fistulotomy with marsupialization for repair of fisture in ano       IR ABDOMINAL AORTOGRAM  3/11/2019     IR VISCERAL EMBOLIZATION  2019     Multiple skin tags - resection  1998     Squamous cell skin cancer resection - back       VASECTOMY           SOCIAL HISTORY:  Social History     Socioeconomic History     Marital status:      Spouse name: librado     Number of children: 0     Years of education: 17     Highest education level: Not on file   Occupational History     Occupation: retired   Tobacco Use     Smoking status: Former Smoker     Packs/day: 0.50     Years: 20.00     Pack years: 10.00     Quit date: 1975     Years since quittin.7     Smokeless tobacco: Former User   Substance and Sexual Activity     Alcohol use: Yes     Alcohol/week: 10.0 - 14.0 standard drinks     Types: 10 - 14 Standard drinks or equivalent per week     Comment: 3 drinks a day/wine     Drug use: No     Sexual activity: Never   Other Topics Concern     Parent/sibling w/ CABG, MI or angioplasty before 65F 55M? Not Asked   Social History Narrative     Not on file     Social Determinants of Health     Financial Resource Strain: Not on file   Food Insecurity: Not on file   Transportation Needs: Not on file   Physical Activity: Not on file   Stress: Not on file   Social Connections: Not on file   Intimate Partner Violence: Not At Risk     Fear of Current or Ex-Partner: No     Emotionally Abused: No     Physically Abused: No     Sexually Abused: No   Housing Stability: Not on file   He quit smoking in .  He drinks 1-3 glasses of wine a night with dinner.  He is retired, and used to work as a .  He lives with his wife in [place].  His cousin had lung cancer and  around age 69.  Otherwise, he denies family history of cancer.        FAMILY HISTORY:  Family History   Problem Relation Age of Onset      "Cerebrovascular Disease Father         x 2     Hypertension Mother      C.A.D. Mother      Cancer Mother         skin     Eye Disorder Mother         glaucoma     Prostate Cancer No family hx of      Cancer - colorectal No family hx of      Glaucoma No family hx of      Macular Degeneration No family hx of          PHYSICAL EXAM:  /76   Pulse 92   Temp 97.4  F (36.3  C) (Tympanic)   Resp 16   Ht 1.753 m (5' 9\")   Wt 96 kg (211 lb 9.6 oz)   SpO2 98%   BMI 31.25 kg/m    ECO  GENERAL/CONSTITUTIONAL: No acute distress.  EYES: No scleral icterus.  NEUROLOGIC: Alert, oriented, answers questions appropriately.  INTEGUMENTARY: No jaundice.    GAIT: Steady, does not use assistive device      LABS:   Latest Reference Range & Units 22 09:17   Sodium 136 - 145 mmol/L 136   Potassium 3.4 - 5.3 mmol/L 4.2   Chloride 98 - 107 mmol/L 100   Carbon Dioxide (CO2) 22 - 29 mmol/L 25   Urea Nitrogen 8.0 - 23.0 mg/dL 21.0   Creatinine 0.67 - 1.17 mg/dL 1.44 (H)   GFR Estimate >60 mL/min/1.73m2 49 (L)   Calcium 8.8 - 10.2 mg/dL 8.9   Anion Gap 7 - 15 mmol/L 11   Albumin 3.5 - 5.2 g/dL 3.9   Protein Total 6.4 - 8.3 g/dL 6.1 (L)   Alkaline Phosphatase 40 - 129 U/L 67   ALT 10 - 50 U/L 44   AST 10 - 50 U/L 30   Bilirubin Total <=1.2 mg/dL 0.6   Glucose 70 - 99 mg/dL 296 (H)   Lactate Dehydrogenase 0 - 250 U/L 172   WBC 4.0 - 11.0 10e3/uL 7.2   Hemoglobin 13.3 - 17.7 g/dL 11.9 (L)   Hematocrit 40.0 - 53.0 % 36.6 (L)   Platelet Count 150 - 450 10e3/uL 70 (L)   RBC Count 4.40 - 5.90 10e6/uL 3.86 (L)   MCV 78 - 100 fL 95   MCH 26.5 - 33.0 pg 30.8   MCHC 31.5 - 36.5 g/dL 32.5   RDW 10.0 - 15.0 % 14.1   % Neutrophils % 44   % Lymphocytes % 49   % Monocytes % 6   % Eosinophils % 1   % Basophils % 0   Absolute Basophils 0.0 - 0.2 10e3/uL 0.0   Absolute Eosinophils 0.0 - 0.7 10e3/uL 0.1   Absolute Immature Granulocytes <=0.4 10e3/uL 0.0   Absolute Lymphocytes 0.8 - 5.3 10e3/uL 3.5   Absolute Monocytes 0.0 - 1.3 10e3/uL 0.4   % " Immature Granulocytes % 0   Absolute Neutrophils 1.6 - 8.3 10e3/uL 3.1   Absolute NRBCs 10e3/uL 0.0   NRBCs per 100 WBC <1 /100 0       ASSESSMENT/PLAN:  Austin Garza is an 82 year old male with:    1) CLL/SLL: BLACKWOOD stage III, with lymphocytosis, lymphadenopathy, splenomegaly, and anemia.  He has mild thrombocytopenia as well, but is above 100K.  He presented with leukocytosis with WBC of 61.7K, hemoglobin of 10.4, and platelet count of 115K on diagnosis.  Bone marrow biopsy and flow cytometry confirmed CLL/SLL.  CT scan shows mildly enlarged left axillary lymph node and periaortic lymph nodes in the retroperitoneum of the abdomen, with enlarged spleen.      Due to worsening lymphocyte count, anemia, thrombocytopenia, as well as fatigue and night sweats, he started ibrutinib on 5/4/20.      His WBC normalized, with stable hemoglobin and platelet count.    In light of his recent issues with atrial fibrillation and now on Eliquis, his ibrutinib has been on hold since August 2021.  Considering his CLL is under good control, his WBC is actually on the lower end of normal, and with risk of atrial fibrillation and bleeding on anticoagulants, we will continue to hold ibrutinib for now and monitor his counts.  He will likely need to resume treatment at some point, but we can follow the counts and we could also consider other treatments as well for CLL.      -Continue to hold ibrutinib.  -He has not had frequent or recent infections.  -CBC stable.    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 He completed radiation at [facility] with planned 60 Gy x 30 fractions. He has completed radiation and noted that he did not need any more follow-up for this.    3) Abdominal aortic aneurysm: measures up to 7.6 cm on CT scan, incidentally found.  He underwent EVAR on 12/4/17.  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  He is on  Plavix now.  On 5/13/19, he underwent and percutaneous aneurysm sac puncture and endo leak embolization by IR on 5/13/19.  -Follow-up with vascular surgery and IR.    4) Diabetes, hypertension:    -Following with PCP.    5) Atrial fibrillation with RVR s/p successful DCCV to SR:  -On metoprolol and Eliquis.  -Following with cardiology.    6) CAD:   -Follow-up with cardiology  -There are continued discussions in regards to PCI/CABG. He is uncertain if he is following with [facility] or locally.    7) Thrombocytopenia: stable.  It had decreased after his [facility] hospitalization, may have been from his acute illness and Bactrim.  Platelet count is around his baseline now.  -Monitor CBC.    8) RTC in 4 months for follow-up and labs: CBC/diff, CMP, LDH.       Miriam Badillo DO  Hematology/Oncology  [facility]        Again, thank you for allowing me to participate in the care of your patient.        Sincerely,        Miriam Badillo DO

## 2022-10-04 DIAGNOSIS — I10 HYPERTENSION GOAL BP (BLOOD PRESSURE) < 140/90: ICD-10-CM

## 2022-10-06 RX ORDER — AMLODIPINE BESYLATE 5 MG/1
7.5 TABLET ORAL DAILY
Qty: 135 TABLET | Refills: 0 | Status: SHIPPED | OUTPATIENT
Start: 2022-10-06 | End: 2023-01-11

## 2022-10-06 NOTE — TELEPHONE ENCOUNTER
Routing refill request to provider for review/approval because:  Labs out of range:  creatinine    Cici Chavarria RN

## 2022-10-14 ENCOUNTER — ONCOLOGY VISIT (OUTPATIENT)
Dept: ONCOLOGY | Facility: CLINIC | Age: 82
End: 2022-10-14
Attending: INTERNAL MEDICINE
Payer: COMMERCIAL

## 2022-10-14 VITALS
HEIGHT: 69 IN | BODY MASS INDEX: 30.94 KG/M2 | SYSTOLIC BLOOD PRESSURE: 126 MMHG | HEART RATE: 73 BPM | OXYGEN SATURATION: 98 % | RESPIRATION RATE: 16 BRPM | WEIGHT: 208.9 LBS | TEMPERATURE: 97.7 F | DIASTOLIC BLOOD PRESSURE: 78 MMHG

## 2022-10-14 DIAGNOSIS — C91.10 CLL (CHRONIC LYMPHOCYTIC LEUKEMIA) (H): Primary | ICD-10-CM

## 2022-10-14 PROCEDURE — G0463 HOSPITAL OUTPT CLINIC VISIT: HCPCS

## 2022-10-14 PROCEDURE — 99214 OFFICE O/P EST MOD 30 MIN: CPT | Performed by: INTERNAL MEDICINE

## 2022-10-14 ASSESSMENT — PAIN SCALES - GENERAL: PAINLEVEL: NO PAIN (0)

## 2022-10-14 NOTE — LETTER
10/14/2022         RE: Austin Garza  [address] [place] [address]        Dear Colleague,    Thank you for referring your patient, Austin Garza, to the [facility]. Please see a copy of my visit note below.    [facility]    Hematology/Oncology Established Patient Note      Today's Date: 10/14/22    Reason for Follow-up: CLL      HISTORY OF PRESENT ILLNESS: Austin Garza is an 82 year old male with CLL previously on ibrutinib and followed by Dr. Perry who has since relocated.     He has PMHx of DMII, HTN who presented with leukocytosis.  In November 2017, he was found to have elevated WBC of 61.7K, hemoglobin of 10.4, and platelet of 115K.  There were no other CBC results available between 2010 and 2017.  Prior to 2010, he had normal CBC, with normal to mild anemia, and mild thrombocytopenia.   He was asymptomatic.    He underwent bone marrow biopsy on 11/21/17, which was consistent with chronic lymphocytic leukemia/small lymphocytic lymphoma, with 13% ringed sideroblasts identified.  The presence of increased numbers of ringed sideroblasts raises the possibility of an associated myelodysplastic syndrome.  Dysplastic changes are not identified though.  Many cases exhibiting ringed sideroblasts are metabolic origin, without significant risk of progression to acute leukemia, and these typically do not show dysplastic morphology as is the case with this specimen.  15% ringed sideroblasts are required for a diagnosis for RARS, which this specimen does not meet.  Flow cytometry is also consistent with CLL/SLL.  Cytogenetics show two (10%) of the metaphase cells comprise a clone characterized by a deletion within the proximal long arm of a chromosome 13 as the sole karyotypic abnormality.  Three (15%) metaphases had loss of the Y chromosome as the sole abnormality.    CT scan on 11/29/17 shows mildly enlarged left axillary lymph node and  periaortic lymph nodes in the retroperitoneum of the abdomen.  Spleen is also enlarged.    On his staging CT scan for CLL, he was incidentally found to have a large infrarenal abdominal aortic aneurysm, measuring 7.6 cm.  He was seen by Dr. Thomas, and he underwent EVAR on 12/4/17.     He started ibrutinib on 5/4/20.  It was held 5/28/20-6/4/20 for tooth extraction.    He was hospitalized on 7/30/2021 for shortness of breath, new CHF, A. fib with RVR and ibrutinib was put on hold.  He was seen by cardiology and eventually underwent cardioversion for the A. fib.  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  He was started on Eliquis and metoprolol.  His Plavix was stopped.    He went to the [facility] ED on 9/26/21 and found to have pericardial effusion.  Due to lack of beds, he was transferred to [facility] for acute pericardial effusion and pericarditis.  He was started on colchicine and prednisone taper x 6 weeks.  He was also given Bactrim for PJP prophylaxis due duration of prednisone course.  He was discharged on 10/1/21.        INTERIM HISTORY:   Since his last visit in clinic, he states he has had multiple hospitalizations for back pain. He informs me he has cardiology follow up as he was discovered to have coronary artery disease. He has had angiogram. There are recommendations in regards to CABG vs PCI. He states he is uncertain if he will move forward with this. He will be seen in 11/3/22.      REVIEW OF SYSTEMS:   14 point ROS was reviewed and is negative other than as noted above in HPI.       HOME MEDICATIONS:  Current Outpatient Medications   Medication Sig Dispense Refill     amLODIPine (NORVASC) 5 MG tablet Take 1.5 tablets (7.5 mg) by mouth daily 135 tablet 0     blood glucose (CONTOUR NEXT TEST) test strip Use to test blood sugar 3-4 times daily or as directed. 100 strip 6     blood glucose monitoring (NO BRAND SPECIFIED) meter device kit Use to test blood sugar 2 times daily  "or as directed. 1 kit 0     clopidogrel (PLAVIX) 75 MG tablet Take 1 tablet (75 mg) by mouth daily 90 tablet 3     dulaglutide (TRULICITY) 1.5 MG/0.5ML pen Inject 1.5 mg Subcutaneous every 7 days 6 mL 3     Melatonin ER 5 MG TBCR Take 5-10 mg by mouth nightly as needed       metoprolol succinate ER (TOPROL XL) 100 MG 24 hr tablet Take 1 tablet (100 mg) by mouth daily 90 tablet 1     nitroGLYcerin (NITROSTAT) 0.4 MG sublingual tablet One tablet under the tongue every 5 minutes if needed for chest pain. May repeat every 5 minutes for a maximum of 3 doses in 15 minutes\" 25 tablet 3     rosuvastatin (CRESTOR) 20 MG tablet Take 1 tablet (20 mg) by mouth daily 90 tablet 3         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      CLL (chronic lymphocytic leukemia)      Diaphragmatic hernia      Diverticulitis of colon      Esophageal reflux      Essential hypertension      Hyperlipidemia      Irritable bowel syndrome      Macular degeneration (senile) of retina      Mumps      Squamous Cell Carcinoma, Back 1988     Type 2 diabetes mellitus          PAST SURGICAL HISTORY:  Past Surgical History:   Procedure Laterality Date     ANESTHESIA CARDIOVERSION N/A 8/2/2021    Procedure: ANESTHESIA, FOR CARDIOVERSION;  Surgeon: GENERIC ANESTHESIA PROVIDER;  Location: [facility] OR     APPENDECTOMY OPEN  1966     BONE MARROW BIOPSY, BONE SPECIMEN, NEEDLE/TROCAR N/A 11/21/2017    Procedure: BIOPSY BONE MARROW;  BONE MARROW BIOPSY;  Surgeon: Shady Garcia MD;  Location:  GI     COLONOSCOPY  2002     CV CORONARY ANGIOGRAM N/A 6/29/2022    Procedure: Coronary Angiogram;  Surgeon: Evaristo Beaulieu MD;  Location:  HEART CARDIAC CATH LAB     ENDOVASCULAR REPAIR ANEURYSM ABDOMINAL AORTA N/A 12/4/2017    Procedure: ENDOVASCULAR REPAIR ANEURYSM " ABDOMINAL AORTA;  ENDOVASCULAR REPAIR ANEURYSM ABDOMINAL AORTA;  Surgeon: Milton Cazares MD;  Location: [facility] OR     Fistulotomy with marsupialization for repair of fisture in ano       IR ABDOMINAL AORTOGRAM  3/11/2019     IR VISCERAL EMBOLIZATION  2019     Multiple skin tags - resection  1998     Squamous cell skin cancer resection - back       VASECTOMY           SOCIAL HISTORY:  Social History     Socioeconomic History     Marital status:      Spouse name: librado     Number of children: 0     Years of education: 17     Highest education level: Not on file   Occupational History     Occupation: retired   Tobacco Use     Smoking status: Former     Packs/day: 0.50     Years: 20.00     Pack years: 10.00     Types: Cigarettes     Quit date: 1975     Years since quittin.8     Smokeless tobacco: Former   Substance and Sexual Activity     Alcohol use: Yes     Alcohol/week: 10.0 - 14.0 standard drinks     Types: 10 - 14 Standard drinks or equivalent per week     Comment: 3 drinks a day/wine     Drug use: No     Sexual activity: Never   Other Topics Concern     Parent/sibling w/ CABG, MI or angioplasty before 65F 55M? Not Asked   Social History Narrative     Not on file     Social Determinants of Health     Financial Resource Strain: Not on file   Food Insecurity: Not on file   Transportation Needs: Not on file   Physical Activity: Not on file   Stress: Not on file   Social Connections: Not on file   Intimate Partner Violence: Not At Risk     Fear of Current or Ex-Partner: No     Emotionally Abused: No     Physically Abused: No     Sexually Abused: No   Housing Stability: Not on file   He quit smoking in .  He drinks 1-3 glasses of wine a night with dinner.  He is retired, and used to work as a .  He lives with his wife in [place].  His cousin had lung cancer and  around age 69.  Otherwise, he denies family history of cancer.        FAMILY HISTORY:  Family History   Problem  Relation Age of Onset     Cerebrovascular Disease Father         x 2     Hypertension Mother      C.A.D. Mother      Cancer Mother         skin     Eye Disorder Mother         glaucoma     Prostate Cancer No family hx of      Cancer - colorectal No family hx of      Glaucoma No family hx of      Macular Degeneration No family hx of          PHYSICAL EXAM:  There were no vitals taken for this visit.  ECO  GENERAL/CONSTITUTIONAL: No acute distress.  EYES: No scleral icterus.  NEUROLOGIC: Alert, oriented, answers questions appropriately.  INTEGUMENTARY: No jaundice.    GAIT: Steady, does not use assistive device      LABS:      ASSESSMENT/PLAN:  Austin Garza is an 82 year old male with:    1) CLL/SLL: BLACKWOOD stage III, with lymphocytosis, lymphadenopathy, splenomegaly, and anemia.  He has mild thrombocytopenia as well, but is above 100K.  He presented with leukocytosis with WBC of 61.7K, hemoglobin of 10.4, and platelet count of 115K on diagnosis.  Bone marrow biopsy and flow cytometry confirmed CLL/SLL.  CT scan shows mildly enlarged left axillary lymph node and periaortic lymph nodes in the retroperitoneum of the abdomen, with enlarged spleen.      Due to worsening lymphocyte count, anemia, thrombocytopenia, as well as fatigue and night sweats, he started ibrutinib on 20.      His WBC normalized, with stable hemoglobin and platelet count.    In light of his recent issues with atrial fibrillation and now on Eliquis, his ibrutinib has been on hold since 2021.  Considering his CLL is under good control, his WBC is actually on the lower end of normal, and with risk of atrial fibrillation and bleeding on anticoagulants, we will continue to hold ibrutinib for now and monitor his counts.  He will likely need to resume treatment at some point, but we can follow the counts and we could also consider other treatments as well for CLL.      -Continue to hold ibrutinib.  -He has not had frequent or recent  infections.  -CBC stable.    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 He completed radiation at [facility] with planned 60 Gy x 30 fractions. He has completed radiation and noted that he did not need any more follow-up for this.    3) Abdominal aortic aneurysm: measures up to 7.6 cm on CT scan, incidentally found.  He underwent EVAR on 12/4/17.  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  He is on Plavix now.  On 5/13/19, he underwent and percutaneous aneurysm sac puncture and endo leak embolization by IR on 5/13/19.  -Follow-up with vascular surgery and IR.    4) Diabetes, hypertension:    -Following with PCP.    5) Atrial fibrillation with RVR s/p successful DCCV to SR:  -On metoprolol. He informs me he is off eliquis.  -Following with cardiology.    6) CAD:   -Follow-up with cardiology  -There are continued discussions in regards to PCI/CABG. He is uncertain if he is following with [place] or locally.  -He is on plavix.    7) Thrombocytopenia: stable.  It had decreased after his [place] hospitalization, may have been from his acute illness and Bactrim.  Platelet count is around his baseline now.  -Monitor CBC.    8) RTC in 4 months for follow-up and labs: CBC/diff, CMP, LDH.       Miriam Badillo DO  Hematology/Oncology  [facility]        Again, thank you for allowing me to participate in the care of your patient.        Sincerely,        Miriam Badillo DO

## 2022-10-14 NOTE — NURSING NOTE
"Oncology Rooming Note    October 14, 2022 9:33 AM   Austin Garza is a 82 year old male who presents for:    Chief Complaint   Patient presents with     Oncology Clinic Visit     Chronic lymphocytic leukemia (CLL), B-cell      Initial Vitals: /78   Pulse 73   Temp 97.7  F (36.5  C) (Oral)   Resp 16   Ht 1.753 m (5' 9\")   Wt 94.8 kg (208 lb 14.4 oz)   SpO2 98%   BMI 30.85 kg/m   Estimated body mass index is 30.85 kg/m  as calculated from the following:    Height as of this encounter: 1.753 m (5' 9\").    Weight as of this encounter: 94.8 kg (208 lb 14.4 oz). Body surface area is 2.15 meters squared.  No Pain (0) Comment: Data Unavailable   No LMP for male patient.  Allergies reviewed: Yes  Medications reviewed: Yes    Medications: Medication refills not needed today.  Pharmacy name entered into [facility]: [id number] - [place], [place] - [address] AT [facility]    Clinical concerns: f/u       Geetha Benítez CMA              "

## 2022-10-14 NOTE — PROGRESS NOTES
[facility]    Hematology/Oncology Established Patient Note      Today's Date: 10/14/22    Reason for Follow-up: CLL      HISTORY OF PRESENT ILLNESS: Austin Garza is an 82 year old male with CLL previously on ibrutinib and followed by Dr. Perry who has since relocated.     He has PMHx of DMII, HTN who presented with leukocytosis.  In November 2017, he was found to have elevated WBC of 61.7K, hemoglobin of 10.4, and platelet of 115K.  There were no other CBC results available between 2010 and 2017.  Prior to 2010, he had normal CBC, with normal to mild anemia, and mild thrombocytopenia.   He was asymptomatic.    He underwent bone marrow biopsy on 11/21/17, which was consistent with chronic lymphocytic leukemia/small lymphocytic lymphoma, with 13% ringed sideroblasts identified.  The presence of increased numbers of ringed sideroblasts raises the possibility of an associated myelodysplastic syndrome.  Dysplastic changes are not identified though.  Many cases exhibiting ringed sideroblasts are metabolic origin, without significant risk of progression to acute leukemia, and these typically do not show dysplastic morphology as is the case with this specimen.  15% ringed sideroblasts are required for a diagnosis for RARS, which this specimen does not meet.  Flow cytometry is also consistent with CLL/SLL.  Cytogenetics show two (10%) of the metaphase cells comprise a clone characterized by a deletion within the proximal long arm of a chromosome 13 as the sole karyotypic abnormality.  Three (15%) metaphases had loss of the Y chromosome as the sole abnormality.    CT scan on 11/29/17 shows mildly enlarged left axillary lymph node and periaortic lymph nodes in the retroperitoneum of the abdomen.  Spleen is also enlarged.    On his staging CT scan for CLL, he was incidentally found to have a large infrarenal abdominal aortic aneurysm, measuring 7.6 cm.  He was seen by Dr. Thomas, and he underwent  EVAR on 12/4/17.     He started ibrutinib on 5/4/20.  It was held 5/28/20-6/4/20 for tooth extraction.    He was hospitalized on 7/30/2021 for shortness of breath, new CHF, A. fib with RVR and ibrutinib was put on hold.  He was seen by cardiology and eventually underwent cardioversion for the A. fib.  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  He was started on Eliquis and metoprolol.  His Plavix was stopped.    He went to the [facility] ED on 9/26/21 and found to have pericardial effusion.  Due to lack of beds, he was transferred to [facility] for acute pericardial effusion and pericarditis.  He was started on colchicine and prednisone taper x 6 weeks.  He was also given Bactrim for PJP prophylaxis due duration of prednisone course.  He was discharged on 10/1/21.        INTERIM HISTORY:   Since his last visit in clinic, he states he has had multiple hospitalizations for back pain. He informs me he has cardiology follow up as he was discovered to have coronary artery disease. He has had angiogram. There are recommendations in regards to CABG vs PCI. He states he is uncertain if he will move forward with this. He will be seen in 11/3/22.      REVIEW OF SYSTEMS:   14 point ROS was reviewed and is negative other than as noted above in HPI.       HOME MEDICATIONS:  Current Outpatient Medications   Medication Sig Dispense Refill     amLODIPine (NORVASC) 5 MG tablet Take 1.5 tablets (7.5 mg) by mouth daily 135 tablet 0     blood glucose (CONTOUR NEXT TEST) test strip Use to test blood sugar 3-4 times daily or as directed. 100 strip 6     blood glucose monitoring (NO BRAND SPECIFIED) meter device kit Use to test blood sugar 2 times daily or as directed. 1 kit 0     clopidogrel (PLAVIX) 75 MG tablet Take 1 tablet (75 mg) by mouth daily 90 tablet 3     dulaglutide (TRULICITY) 1.5 MG/0.5ML pen Inject 1.5 mg Subcutaneous every 7 days 6 mL 3     Melatonin ER 5 MG TBCR Take 5-10 mg by mouth nightly as  "needed       metoprolol succinate ER (TOPROL XL) 100 MG 24 hr tablet Take 1 tablet (100 mg) by mouth daily 90 tablet 1     nitroGLYcerin (NITROSTAT) 0.4 MG sublingual tablet One tablet under the tongue every 5 minutes if needed for chest pain. May repeat every 5 minutes for a maximum of 3 doses in 15 minutes\" 25 tablet 3     rosuvastatin (CRESTOR) 20 MG tablet Take 1 tablet (20 mg) by mouth daily 90 tablet 3         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      CLL (chronic lymphocytic leukemia)      Diaphragmatic hernia      Diverticulitis of colon      Esophageal reflux      Essential hypertension      Hyperlipidemia      Irritable bowel syndrome      Macular degeneration (senile) of retina      Mumps      Squamous Cell Carcinoma, Back 1988     Type 2 diabetes mellitus          PAST SURGICAL HISTORY:  Past Surgical History:   Procedure Laterality Date     ANESTHESIA CARDIOVERSION N/A 8/2/2021    Procedure: ANESTHESIA, FOR CARDIOVERSION;  Surgeon: GENERIC ANESTHESIA PROVIDER;  Location:  OR     APPENDECTOMY OPEN  1966     BONE MARROW BIOPSY, BONE SPECIMEN, NEEDLE/TROCAR N/A 11/21/2017    Procedure: BIOPSY BONE MARROW;  BONE MARROW BIOPSY;  Surgeon: Shady Garcia MD;  Location:  GI     COLONOSCOPY  2002     CV CORONARY ANGIOGRAM N/A 6/29/2022    Procedure: Coronary Angiogram;  Surgeon: Evaristo Beaulieu MD;  Location:  HEART CARDIAC CATH LAB     ENDOVASCULAR REPAIR ANEURYSM ABDOMINAL AORTA N/A 12/4/2017    Procedure: ENDOVASCULAR REPAIR ANEURYSM ABDOMINAL AORTA;  ENDOVASCULAR REPAIR ANEURYSM ABDOMINAL AORTA;  Surgeon: Milton Cazares MD;  Location:  OR     Fistulotomy with marsupialization for repair of fisture in ano  2007     IR ABDOMINAL AORTOGRAM  3/11/2019     IR VISCERAL EMBOLIZATION  " 2019     Multiple skin tags - resection  1998     Squamous cell skin cancer resection - back       VASECTOMY           SOCIAL HISTORY:  Social History     Socioeconomic History     Marital status:      Spouse name: librado     Number of children: 0     Years of education: 17     Highest education level: Not on file   Occupational History     Occupation: retired   Tobacco Use     Smoking status: Former     Packs/day: 0.50     Years: 20.00     Pack years: 10.00     Types: Cigarettes     Quit date: 1975     Years since quittin.8     Smokeless tobacco: Former   Substance and Sexual Activity     Alcohol use: Yes     Alcohol/week: 10.0 - 14.0 standard drinks     Types: 10 - 14 Standard drinks or equivalent per week     Comment: 3 drinks a day/wine     Drug use: No     Sexual activity: Never   Other Topics Concern     Parent/sibling w/ CABG, MI or angioplasty before 65F 55M? Not Asked   Social History Narrative     Not on file     Social Determinants of Health     Financial Resource Strain: Not on file   Food Insecurity: Not on file   Transportation Needs: Not on file   Physical Activity: Not on file   Stress: Not on file   Social Connections: Not on file   Intimate Partner Violence: Not At Risk     Fear of Current or Ex-Partner: No     Emotionally Abused: No     Physically Abused: No     Sexually Abused: No   Housing Stability: Not on file   He quit smoking in .  He drinks 1-3 glasses of wine a night with dinner.  He is retired, and used to work as a .  He lives with his wife in [place].  His cousin had lung cancer and  around age 69.  Otherwise, he denies family history of cancer.        FAMILY HISTORY:  Family History   Problem Relation Age of Onset     Cerebrovascular Disease Father         x 2     Hypertension Mother      C.A.D. Mother      Cancer Mother         skin     Eye Disorder Mother         glaucoma     Prostate Cancer No family hx of      Cancer - colorectal No family hx  of      Glaucoma No family hx of      Macular Degeneration No family hx of          PHYSICAL EXAM:  There were no vitals taken for this visit.  ECO  GENERAL/CONSTITUTIONAL: No acute distress.  EYES: No scleral icterus.  NEUROLOGIC: Alert, oriented, answers questions appropriately.  INTEGUMENTARY: No jaundice.    GAIT: Steady, does not use assistive device      LABS:      ASSESSMENT/PLAN:  Austin Garza is an 82 year old male with:    1) CLL/SLL: BLACKWOOD stage III, with lymphocytosis, lymphadenopathy, splenomegaly, and anemia.  He has mild thrombocytopenia as well, but is above 100K.  He presented with leukocytosis with WBC of 61.7K, hemoglobin of 10.4, and platelet count of 115K on diagnosis.  Bone marrow biopsy and flow cytometry confirmed CLL/SLL.  CT scan shows mildly enlarged left axillary lymph node and periaortic lymph nodes in the retroperitoneum of the abdomen, with enlarged spleen.      Due to worsening lymphocyte count, anemia, thrombocytopenia, as well as fatigue and night sweats, he started ibrutinib on 20.      His WBC normalized, with stable hemoglobin and platelet count.    In light of his recent issues with atrial fibrillation and now on Eliquis, his ibrutinib has been on hold since 2021.  Considering his CLL is under good control, his WBC is actually on the lower end of normal, and with risk of atrial fibrillation and bleeding on anticoagulants, we will continue to hold ibrutinib for now and monitor his counts.  He will likely need to resume treatment at some point, but we can follow the counts and we could also consider other treatments as well for CLL.      -Continue to hold ibrutinib.  -He has not had frequent or recent infections.  -CBC stable.    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 He completed radiation at [facility] with planned 60 Gy x 30 fractions. He has completed radiation and noted  that he did not need any more follow-up for this.    3) Abdominal aortic aneurysm: measures up to 7.6 cm on CT scan, incidentally found.  He underwent EVAR on 12/4/17.  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  He is on Plavix now.  On 5/13/19, he underwent and percutaneous aneurysm sac puncture and endo leak embolization by IR on 5/13/19.  -Follow-up with vascular surgery and IR.    4) Diabetes, hypertension:    -Following with PCP.    5) Atrial fibrillation with RVR s/p successful DCCV to SR:  -On metoprolol. He informs me he is off eliquis.  -Following with cardiology.    6) CAD:   -Follow-up with cardiology  -There are continued discussions in regards to PCI/CABG. He is uncertain if he is following with [place] or locally.  -He is on plavix.    7) Thrombocytopenia: stable.  It had decreased after his [place] hospitalization, may have been from his acute illness and Bactrim.  Platelet count is around his baseline now.  -Monitor CBC.    8) RTC in 4 months for follow-up and labs: CBC/diff, CMP, LDH.       Miriam Badillo,   Hematology/Oncology  [facility]

## 2022-10-18 NOTE — PATIENT INSTRUCTIONS
Benji is scheduled for labs on 02/24/23 at 9:30 and a follow up with Dr. Badillo on 02/16/22 at 11:30 am.    Letty Arevalo RN on 10/18/2022 at 10:15 AM

## 2022-10-26 DIAGNOSIS — I71.40 ABDOMINAL AORTIC ANEURYSM (AAA) WITHOUT RUPTURE (H): ICD-10-CM

## 2022-10-26 RX ORDER — CLOPIDOGREL BISULFATE 75 MG/1
75 TABLET ORAL DAILY
Qty: 90 TABLET | Refills: 0 | Status: SHIPPED | OUTPATIENT
Start: 2022-10-26 | End: 2023-02-08

## 2022-10-26 NOTE — TELEPHONE ENCOUNTER
Medication Refilled: Clopidogrel  Last office visit: 7/22/22 with Linda Castañeda  Last Labs/EKG: NA  Next office visit: prn - Pt seeking second opinion through [facility] follow up orders in place   Pharmacy sent to: MARYANN Pena RN

## 2022-11-10 ENCOUNTER — TRANSFERRED RECORDS (OUTPATIENT)
Dept: MULTI SPECIALTY CLINIC | Facility: CLINIC | Age: 82
End: 2022-11-10

## 2022-11-10 LAB — HBA1C MFR BLD: 6.1 % (ref 4–5.6)

## 2022-11-17 ENCOUNTER — TRANSCRIBE ORDERS (OUTPATIENT)
Dept: OTHER | Age: 82
End: 2022-11-17

## 2022-11-17 DIAGNOSIS — Z95.5 S/P CORONARY ARTERY STENT PLACEMENT: Primary | ICD-10-CM

## 2022-11-28 ENCOUNTER — OFFICE VISIT (OUTPATIENT)
Dept: PEDIATRICS | Facility: CLINIC | Age: 82
End: 2022-11-28
Payer: COMMERCIAL

## 2022-11-28 VITALS
DIASTOLIC BLOOD PRESSURE: 68 MMHG | WEIGHT: 213 LBS | OXYGEN SATURATION: 98 % | SYSTOLIC BLOOD PRESSURE: 138 MMHG | HEART RATE: 80 BPM | BODY MASS INDEX: 31.45 KG/M2

## 2022-11-28 DIAGNOSIS — I25.10 ATHEROSCLEROSIS OF NATIVE CORONARY ARTERY OF NATIVE HEART WITHOUT ANGINA PECTORIS: Primary | ICD-10-CM

## 2022-11-28 DIAGNOSIS — Z86.0100 HISTORY OF COLONIC POLYPS: ICD-10-CM

## 2022-11-28 DIAGNOSIS — E66.01 MORBIDLY OBESE (H): ICD-10-CM

## 2022-11-28 PROCEDURE — 36415 COLL VENOUS BLD VENIPUNCTURE: CPT | Performed by: STUDENT IN AN ORGANIZED HEALTH CARE EDUCATION/TRAINING PROGRAM

## 2022-11-28 PROCEDURE — 80048 BASIC METABOLIC PNL TOTAL CA: CPT | Performed by: STUDENT IN AN ORGANIZED HEALTH CARE EDUCATION/TRAINING PROGRAM

## 2022-11-28 PROCEDURE — 99214 OFFICE O/P EST MOD 30 MIN: CPT | Performed by: STUDENT IN AN ORGANIZED HEALTH CARE EDUCATION/TRAINING PROGRAM

## 2022-11-28 PROCEDURE — 85027 COMPLETE CBC AUTOMATED: CPT | Performed by: STUDENT IN AN ORGANIZED HEALTH CARE EDUCATION/TRAINING PROGRAM

## 2022-11-28 NOTE — LETTER
December 2, 2022      Benji Garza  [address] [place] [address]        Dear Benji,     Here are your recent lab results:     Your electrolytes and kidney function were stable.       Your blood counts were stable.     Resulted Orders   Basic metabolic panel  (Ca, Cl, CO2, Creat, Gluc, K, Na, BUN)   Result Value Ref Range    Sodium 135 (L) 136 - 145 mmol/L    Potassium 4.6 3.4 - 5.3 mmol/L    Chloride 100 98 - 107 mmol/L    Carbon Dioxide (CO2) 24 22 - 29 mmol/L    Anion Gap 11 7 - 15 mmol/L    Urea Nitrogen 21.2 8.0 - 23.0 mg/dL    Creatinine 1.42 (H) 0.67 - 1.17 mg/dL    Calcium 8.8 8.8 - 10.2 mg/dL    Glucose 182 (H) 70 - 99 mg/dL    GFR Estimate 50 (L) >60 mL/min/1.73m2      Comment:      Effective December 21, 2021 eGFRcr in adults is calculated using the 2021 CKD-EPI creatinine equation which includes age and gender (Laurel et al., NEJ, DOI: 10.1056/JZPLdy1620053)   CBC with platelets   Result Value Ref Range    WBC Count 8.9 4.0 - 11.0 10e3/uL    RBC Count 3.99 (L) 4.40 - 5.90 10e6/uL    Hemoglobin 12.5 (L) 13.3 - 17.7 g/dL    Hematocrit 37.4 (L) 40.0 - 53.0 %    MCV 94 78 - 100 fL    MCH 31.3 26.5 - 33.0 pg    MCHC 33.4 31.5 - 36.5 g/dL    RDW 14.1 10.0 - 15.0 %    Platelet Count 89 (L) 150 - 450 10e3/uL       Please let us know if you have questions or concerns.           Best,         Luma Kauffman MD   Internal Medicine & Pediatrics   [facility]

## 2022-11-28 NOTE — PATIENT INSTRUCTIONS
So nice to meet you!    We got some blood work today.    Ok to lift heavier than 10 lbs  Ok to resume exercise - just take it easy to start out and gradually increase! Walking and biking are good!      Try some moisturizing lotion or over the counter hydrocortisone 1% steroid cream twice per day

## 2022-11-28 NOTE — PROGRESS NOTES
Assessment & Plan     Atherosclerosis of native coronary artery of native heart without angina pectoris  S/p stent. Angiogram with radial approach - some bruising but healed well. Symptoms not much changed per patient report. Continue DAPT, statin, beta blocker. Has follow up with cardiology at [place].  - Basic metabolic panel  (Ca, Cl, CO2, Creat, Gluc, K, Na, BUN); Future  - CBC with platelets; Future  - Basic metabolic panel  (Ca, Cl, CO2, Creat, Gluc, K, Na, BUN)  - CBC with platelets    Morbidly obese (H)  Complicated by coronary artery disease as per above.    History of colonic polyps  Needed polypectomy but patient reports second time he went at [facility] was told prep was inadequate. Needs to be off plavix for polypectomy and just had stent and is on DAPT so would wait at least 6 months and check with cardiology about holding for procedure.      Return in about 6 months (around 5/28/2023) for Annual Wellness Exam.   -a1c  -colonoscopy    Luma Kauffman MD  [facility] [place] is a 82 year old, presenting for the following health issues:  Hospital F/U    Call [facility] to schedule appointment for colonoscopy  [phone] [address] [facility]  [address]Clinic) / [address] (Endoscopy Center)  [place] [address]      [facility]   Hospital Follow-up Visit:    Hospital/Nursing Home/ Rehab Facility: Department of Cardiovascular Medicine in [place], [place]  Date of Admission: 11/09/2022  Date of Discharge: 11/10/2022  Reason(s) for Admission: Fatigue & Dyspnea on Exertion  - had coronary stent placed    Was your hospitalization related to COVID-19? No   Problems taking medications regularly:  None  Medication changes since discharge: None  Problems adhering to non-medication therapy:  None    Summary of hospitalization:  CareEverywhere information obtained and reviewed  Diagnostic Tests/Treatments reviewed.  Follow up needed: cardiology, labs  Other Healthcare Providers  Involved in Patient s Care:         Specialist appointment - cardiology January'  Update since discharge: stable.    -morning not as tired but gets tired at noon    -shortness of breath          Plan of care communicated with patient               Objective    There were no vitals taken for this visit. blood pressure 138/68  There is no height or weight on file to calculate BMI.  Physical Exam   GEN: Alert and appropriately interactive for age  EYES: Eyes grossly normal to inspection, conjunctivae and sclerae normal  RESP: Breathing comfortably on room air   CV: Warm and well perfused peripheral extremities; RRR  MS: no gross musculoskeletal defects noted, no lower extremity edema  NEURO: Alert and oriented. Speech fluent.    PSYCH: Affect normal/bright. Appearance well groomed.   SKIN: old ecchymoses (purple and yellow) on right forearm

## 2022-11-28 NOTE — PROGRESS NOTES
{PROVIDER CHARTING PREFERENCE:479548}    Tawanda Leblanc is a 82 year old{ACCOMPANIED BY STATEMENT (Optional):326033}, presenting for the following health issues:  No chief complaint on file.      HPI     ED/UC Followup:    Facility:  Critical access hospital   Date of visit: 06/29/2022   Reason for visit: Atrial fibrillation, cardiomyopathy  Current Status: ***    {additonal problems for provider to add (Optional):950492}    Review of Systems   {ROS COMP (Optional):166824}      Objective    There were no vitals taken for this visit.  There is no height or weight on file to calculate BMI.  Physical Exam   {Exam List (Optional):942603}    {Diagnostic Test Results (Optional):961979}    {AMBULATORY ATTESTATION (Optional):650664}

## 2022-11-29 LAB
ANION GAP SERPL CALCULATED.3IONS-SCNC: 11 MMOL/L (ref 7–15)
BUN SERPL-MCNC: 21.2 MG/DL (ref 8–23)
CALCIUM SERPL-MCNC: 8.8 MG/DL (ref 8.8–10.2)
CHLORIDE SERPL-SCNC: 100 MMOL/L (ref 98–107)
CREAT SERPL-MCNC: 1.42 MG/DL (ref 0.67–1.17)
DEPRECATED HCO3 PLAS-SCNC: 24 MMOL/L (ref 22–29)
ERYTHROCYTE [DISTWIDTH] IN BLOOD BY AUTOMATED COUNT: 14.1 % (ref 10–15)
GFR SERPL CREATININE-BSD FRML MDRD: 50 ML/MIN/1.73M2
GLUCOSE SERPL-MCNC: 182 MG/DL (ref 70–99)
HCT VFR BLD AUTO: 37.4 % (ref 40–53)
HGB BLD-MCNC: 12.5 G/DL (ref 13.3–17.7)
MCH RBC QN AUTO: 31.3 PG (ref 26.5–33)
MCHC RBC AUTO-ENTMCNC: 33.4 G/DL (ref 31.5–36.5)
MCV RBC AUTO: 94 FL (ref 78–100)
PLATELET # BLD AUTO: 89 10E3/UL (ref 150–450)
POTASSIUM SERPL-SCNC: 4.6 MMOL/L (ref 3.4–5.3)
RBC # BLD AUTO: 3.99 10E6/UL (ref 4.4–5.9)
SODIUM SERPL-SCNC: 135 MMOL/L (ref 136–145)
WBC # BLD AUTO: 8.9 10E3/UL (ref 4–11)

## 2022-12-13 ENCOUNTER — HOSPITAL ENCOUNTER (OUTPATIENT)
Dept: CARDIAC REHAB | Facility: CLINIC | Age: 82
Discharge: HOME | End: 2022-12-13
Attending: INTERNAL MEDICINE
Payer: COMMERCIAL

## 2022-12-13 PROCEDURE — 93797 PHYS/QHP OP CAR RHAB WO ECG: CPT | Mod: 59

## 2022-12-13 PROCEDURE — 93798 PHYS/QHP OP CAR RHAB W/ECG: CPT

## 2022-12-26 ENCOUNTER — HOSPITAL ENCOUNTER (OUTPATIENT)
Dept: CARDIAC REHAB | Facility: CLINIC | Age: 82
Discharge: HOME | End: 2022-12-26
Attending: INTERNAL MEDICINE
Payer: COMMERCIAL

## 2022-12-26 PROCEDURE — 93798 PHYS/QHP OP CAR RHAB W/ECG: CPT | Performed by: REHABILITATION PRACTITIONER

## 2022-12-29 DIAGNOSIS — I10 ESSENTIAL HYPERTENSION: ICD-10-CM

## 2022-12-29 RX ORDER — METOPROLOL SUCCINATE 100 MG/1
100 TABLET, EXTENDED RELEASE ORAL DAILY
Qty: 90 TABLET | Refills: 0 | Status: SHIPPED | OUTPATIENT
Start: 2022-12-29 | End: 2023-02-08

## 2022-12-29 NOTE — TELEPHONE ENCOUNTER
Received refill request for metoprolol succinate 100 mg daily. Last visit on 7/8/22 with SHAWNA Mckeon. Per records in Care Everywhere pt has been following with cardiology at [facility]. Called pt, per pt he intends to continue to follow up with [facility], advised pt should have his current cardiologist refill this medication going foward. Pt stated he has only 3 tablets of metoprolol left, requested we refill this time and he will obtain future refills from his PCP or cardiologist at [place]. Prescription sent to pharmacy for 90 day supply and 0 refills.

## 2023-01-01 ENCOUNTER — INFUSION THERAPY VISIT (OUTPATIENT)
Dept: INFUSION THERAPY | Facility: CLINIC | Age: 83
End: 2023-01-01
Attending: INTERNAL MEDICINE
Payer: COMMERCIAL

## 2023-01-01 ENCOUNTER — HEALTH MAINTENANCE LETTER (OUTPATIENT)
Age: 83
End: 2023-01-01

## 2023-01-01 ENCOUNTER — LAB (OUTPATIENT)
Dept: LAB | Facility: CLINIC | Age: 83
End: 2023-01-01
Payer: COMMERCIAL

## 2023-01-01 ENCOUNTER — APPOINTMENT (OUTPATIENT)
Dept: CT IMAGING | Facility: CLINIC | Age: 83
End: 2023-01-01
Attending: EMERGENCY MEDICINE
Payer: COMMERCIAL

## 2023-01-01 ENCOUNTER — HOSPITAL ENCOUNTER (EMERGENCY)
Facility: CLINIC | Age: 83
Discharge: LEFT BEFORE DISCHARGE | End: 2023-06-28
Admitting: EMERGENCY MEDICINE
Payer: COMMERCIAL

## 2023-01-01 ENCOUNTER — OFFICE VISIT (OUTPATIENT)
Dept: PEDIATRICS | Facility: CLINIC | Age: 83
End: 2023-01-01
Payer: COMMERCIAL

## 2023-01-01 ENCOUNTER — TRANSFERRED RECORDS (OUTPATIENT)
Dept: HEALTH INFORMATION MANAGEMENT | Facility: CLINIC | Age: 83
End: 2023-01-01
Payer: COMMERCIAL

## 2023-01-01 ENCOUNTER — HOSPITAL ENCOUNTER (INPATIENT)
Facility: CLINIC | Age: 83
LOS: 12 days | Discharge: HOME | DRG: 330 | End: 2023-09-20
Attending: COLON & RECTAL SURGERY | Admitting: COLON & RECTAL SURGERY
Payer: COMMERCIAL

## 2023-01-01 ENCOUNTER — APPOINTMENT (OUTPATIENT)
Dept: PHYSICAL THERAPY | Facility: CLINIC | Age: 83
DRG: 330 | End: 2023-01-01
Attending: INTERNAL MEDICINE
Payer: COMMERCIAL

## 2023-01-01 ENCOUNTER — MYC MEDICAL ADVICE (OUTPATIENT)
Dept: PEDIATRICS | Facility: CLINIC | Age: 83
End: 2023-01-01
Payer: COMMERCIAL

## 2023-01-01 ENCOUNTER — MYC MEDICAL ADVICE (OUTPATIENT)
Dept: PEDIATRICS | Facility: CLINIC | Age: 83
End: 2023-01-01

## 2023-01-01 ENCOUNTER — NURSE TRIAGE (OUTPATIENT)
Dept: CARDIOLOGY | Facility: CLINIC | Age: 83
End: 2023-01-01
Payer: COMMERCIAL

## 2023-01-01 ENCOUNTER — TELEPHONE (OUTPATIENT)
Dept: PEDIATRICS | Facility: CLINIC | Age: 83
End: 2023-01-01

## 2023-01-01 ENCOUNTER — MYC MEDICAL ADVICE (OUTPATIENT)
Dept: ONCOLOGY | Facility: CLINIC | Age: 83
End: 2023-01-01
Payer: COMMERCIAL

## 2023-01-01 ENCOUNTER — TRANSFERRED RECORDS (OUTPATIENT)
Dept: HEALTH INFORMATION MANAGEMENT | Facility: CLINIC | Age: 83
End: 2023-01-01

## 2023-01-01 ENCOUNTER — HOSPITAL ENCOUNTER (OUTPATIENT)
Facility: CLINIC | Age: 83
Setting detail: OBSERVATION
Discharge: HOME | End: 2023-07-30
Attending: EMERGENCY MEDICINE | Admitting: INTERNAL MEDICINE
Payer: COMMERCIAL

## 2023-01-01 ENCOUNTER — DOCUMENTATION ONLY (OUTPATIENT)
Dept: PHARMACY | Facility: CLINIC | Age: 83
End: 2023-01-01
Payer: COMMERCIAL

## 2023-01-01 ENCOUNTER — LAB (OUTPATIENT)
Dept: ONCOLOGY | Facility: CLINIC | Age: 83
End: 2023-01-01
Attending: INTERNAL MEDICINE
Payer: COMMERCIAL

## 2023-01-01 ENCOUNTER — APPOINTMENT (OUTPATIENT)
Dept: GENERAL RADIOLOGY | Facility: CLINIC | Age: 83
DRG: 330 | End: 2023-01-01
Attending: COLON & RECTAL SURGERY
Payer: COMMERCIAL

## 2023-01-01 ENCOUNTER — TELEPHONE (OUTPATIENT)
Dept: PEDIATRICS | Facility: CLINIC | Age: 83
End: 2023-01-01
Payer: COMMERCIAL

## 2023-01-01 ENCOUNTER — PATIENT OUTREACH (OUTPATIENT)
Dept: ONCOLOGY | Facility: CLINIC | Age: 83
End: 2023-01-01
Payer: COMMERCIAL

## 2023-01-01 ENCOUNTER — PATIENT OUTREACH (OUTPATIENT)
Dept: CARE COORDINATION | Facility: CLINIC | Age: 83
End: 2023-01-01
Payer: COMMERCIAL

## 2023-01-01 ENCOUNTER — HOSPITAL ENCOUNTER (EMERGENCY)
Facility: CLINIC | Age: 83
Discharge: HOME | End: 2023-12-24
Attending: EMERGENCY MEDICINE | Admitting: EMERGENCY MEDICINE
Payer: COMMERCIAL

## 2023-01-01 ENCOUNTER — APPOINTMENT (OUTPATIENT)
Dept: GENERAL RADIOLOGY | Facility: CLINIC | Age: 83
DRG: 330 | End: 2023-01-01
Attending: INTERNAL MEDICINE
Payer: COMMERCIAL

## 2023-01-01 ENCOUNTER — ANESTHESIA (OUTPATIENT)
Dept: SURGERY | Facility: CLINIC | Age: 83
DRG: 330 | End: 2023-01-01
Payer: COMMERCIAL

## 2023-01-01 ENCOUNTER — ANCILLARY PROCEDURE (OUTPATIENT)
Dept: GENERAL RADIOLOGY | Facility: CLINIC | Age: 83
End: 2023-01-01
Attending: PHYSICIAN ASSISTANT
Payer: COMMERCIAL

## 2023-01-01 ENCOUNTER — PATIENT OUTREACH (OUTPATIENT)
Dept: ONCOLOGY | Facility: CLINIC | Age: 83
End: 2023-01-01

## 2023-01-01 ENCOUNTER — ANESTHESIA EVENT (OUTPATIENT)
Dept: SURGERY | Facility: CLINIC | Age: 83
End: 2023-01-01
Payer: COMMERCIAL

## 2023-01-01 ENCOUNTER — HOSPITAL ENCOUNTER (EMERGENCY)
Facility: CLINIC | Age: 83
Discharge: HOME | End: 2023-09-28
Attending: EMERGENCY MEDICINE | Admitting: EMERGENCY MEDICINE
Payer: COMMERCIAL

## 2023-01-01 ENCOUNTER — OFFICE VISIT (OUTPATIENT)
Dept: CARDIOLOGY | Facility: CLINIC | Age: 83
End: 2023-01-01
Attending: STUDENT IN AN ORGANIZED HEALTH CARE EDUCATION/TRAINING PROGRAM
Payer: COMMERCIAL

## 2023-01-01 ENCOUNTER — ANESTHESIA EVENT (OUTPATIENT)
Dept: SURGERY | Facility: CLINIC | Age: 83
DRG: 330 | End: 2023-01-01
Payer: COMMERCIAL

## 2023-01-01 ENCOUNTER — HOSPITAL ENCOUNTER (OUTPATIENT)
Dept: CARDIOLOGY | Facility: CLINIC | Age: 83
Discharge: HOME | End: 2023-07-14
Attending: STUDENT IN AN ORGANIZED HEALTH CARE EDUCATION/TRAINING PROGRAM | Admitting: STUDENT IN AN ORGANIZED HEALTH CARE EDUCATION/TRAINING PROGRAM
Payer: COMMERCIAL

## 2023-01-01 ENCOUNTER — ANESTHESIA (OUTPATIENT)
Dept: SURGERY | Facility: CLINIC | Age: 83
End: 2023-01-01
Payer: COMMERCIAL

## 2023-01-01 ENCOUNTER — APPOINTMENT (OUTPATIENT)
Dept: CARDIOLOGY | Facility: CLINIC | Age: 83
End: 2023-01-01
Attending: INTERNAL MEDICINE
Payer: COMMERCIAL

## 2023-01-01 ENCOUNTER — APPOINTMENT (OUTPATIENT)
Dept: GENERAL RADIOLOGY | Facility: CLINIC | Age: 83
End: 2023-01-01
Attending: INTERNAL MEDICINE
Payer: COMMERCIAL

## 2023-01-01 ENCOUNTER — NURSE TRIAGE (OUTPATIENT)
Dept: PEDIATRICS | Facility: CLINIC | Age: 83
End: 2023-01-01
Payer: COMMERCIAL

## 2023-01-01 ENCOUNTER — APPOINTMENT (OUTPATIENT)
Dept: CT IMAGING | Facility: CLINIC | Age: 83
DRG: 330 | End: 2023-01-01
Attending: COLON & RECTAL SURGERY
Payer: COMMERCIAL

## 2023-01-01 VITALS
OXYGEN SATURATION: 99 % | DIASTOLIC BLOOD PRESSURE: 56 MMHG | HEIGHT: 69 IN | SYSTOLIC BLOOD PRESSURE: 84 MMHG | BODY MASS INDEX: 26.76 KG/M2 | WEIGHT: 180.7 LBS | TEMPERATURE: 98.2 F | RESPIRATION RATE: 20 BRPM | HEART RATE: 101 BPM

## 2023-01-01 VITALS
HEART RATE: 95 BPM | BODY MASS INDEX: 27.9 KG/M2 | SYSTOLIC BLOOD PRESSURE: 111 MMHG | RESPIRATION RATE: 20 BRPM | OXYGEN SATURATION: 99 % | TEMPERATURE: 98.1 F | DIASTOLIC BLOOD PRESSURE: 70 MMHG | WEIGHT: 188.9 LBS

## 2023-01-01 VITALS
OXYGEN SATURATION: 98 % | BODY MASS INDEX: 28.39 KG/M2 | HEIGHT: 69 IN | WEIGHT: 191.7 LBS | SYSTOLIC BLOOD PRESSURE: 122 MMHG | DIASTOLIC BLOOD PRESSURE: 60 MMHG | HEART RATE: 92 BPM

## 2023-01-01 VITALS
HEIGHT: 69 IN | WEIGHT: 200.84 LBS | OXYGEN SATURATION: 97 % | TEMPERATURE: 97.9 F | RESPIRATION RATE: 18 BRPM | BODY MASS INDEX: 29.75 KG/M2 | HEART RATE: 85 BPM | DIASTOLIC BLOOD PRESSURE: 80 MMHG | SYSTOLIC BLOOD PRESSURE: 153 MMHG

## 2023-01-01 VITALS
SYSTOLIC BLOOD PRESSURE: 137 MMHG | HEIGHT: 68 IN | TEMPERATURE: 97.2 F | HEART RATE: 84 BPM | RESPIRATION RATE: 16 BRPM | OXYGEN SATURATION: 99 % | WEIGHT: 204.5 LBS | BODY MASS INDEX: 30.99 KG/M2 | DIASTOLIC BLOOD PRESSURE: 81 MMHG

## 2023-01-01 VITALS
RESPIRATION RATE: 17 BRPM | OXYGEN SATURATION: 98 % | SYSTOLIC BLOOD PRESSURE: 148 MMHG | WEIGHT: 184.97 LBS | BODY MASS INDEX: 27.4 KG/M2 | TEMPERATURE: 98.3 F | HEART RATE: 83 BPM | DIASTOLIC BLOOD PRESSURE: 76 MMHG | HEIGHT: 69 IN

## 2023-01-01 VITALS
HEART RATE: 96 BPM | DIASTOLIC BLOOD PRESSURE: 77 MMHG | SYSTOLIC BLOOD PRESSURE: 130 MMHG | WEIGHT: 194.1 LBS | BODY MASS INDEX: 28.66 KG/M2 | RESPIRATION RATE: 16 BRPM | TEMPERATURE: 98 F | OXYGEN SATURATION: 100 %

## 2023-01-01 VITALS
HEART RATE: 94 BPM | TEMPERATURE: 97.4 F | TEMPERATURE: 98.1 F | RESPIRATION RATE: 18 BRPM | OXYGEN SATURATION: 97 % | WEIGHT: 193.6 LBS | WEIGHT: 183.4 LBS | BODY MASS INDEX: 28.59 KG/M2 | SYSTOLIC BLOOD PRESSURE: 134 MMHG | BODY MASS INDEX: 27.08 KG/M2 | DIASTOLIC BLOOD PRESSURE: 82 MMHG | DIASTOLIC BLOOD PRESSURE: 82 MMHG | RESPIRATION RATE: 16 BRPM | HEART RATE: 96 BPM | OXYGEN SATURATION: 100 % | SYSTOLIC BLOOD PRESSURE: 119 MMHG

## 2023-01-01 VITALS
BODY MASS INDEX: 27.37 KG/M2 | SYSTOLIC BLOOD PRESSURE: 102 MMHG | DIASTOLIC BLOOD PRESSURE: 54 MMHG | HEART RATE: 89 BPM | HEIGHT: 69 IN | TEMPERATURE: 98.4 F | WEIGHT: 184.8 LBS | RESPIRATION RATE: 20 BRPM | OXYGEN SATURATION: 100 %

## 2023-01-01 VITALS
DIASTOLIC BLOOD PRESSURE: 85 MMHG | TEMPERATURE: 97.1 F | SYSTOLIC BLOOD PRESSURE: 131 MMHG | RESPIRATION RATE: 22 BRPM | WEIGHT: 176.81 LBS | HEART RATE: 100 BPM | OXYGEN SATURATION: 99 % | BODY MASS INDEX: 26.11 KG/M2

## 2023-01-01 VITALS
SYSTOLIC BLOOD PRESSURE: 114 MMHG | WEIGHT: 178.7 LBS | BODY MASS INDEX: 26.39 KG/M2 | TEMPERATURE: 97.7 F | OXYGEN SATURATION: 97 % | RESPIRATION RATE: 18 BRPM | DIASTOLIC BLOOD PRESSURE: 71 MMHG | HEART RATE: 96 BPM

## 2023-01-01 VITALS
OXYGEN SATURATION: 99 % | BODY MASS INDEX: 28.44 KG/M2 | SYSTOLIC BLOOD PRESSURE: 147 MMHG | HEART RATE: 78 BPM | RESPIRATION RATE: 16 BRPM | DIASTOLIC BLOOD PRESSURE: 74 MMHG | HEIGHT: 69 IN | TEMPERATURE: 97.9 F | WEIGHT: 192 LBS

## 2023-01-01 VITALS
TEMPERATURE: 97.6 F | HEIGHT: 68 IN | BODY MASS INDEX: 30.62 KG/M2 | SYSTOLIC BLOOD PRESSURE: 122 MMHG | HEART RATE: 99 BPM | RESPIRATION RATE: 20 BRPM | OXYGEN SATURATION: 99 % | DIASTOLIC BLOOD PRESSURE: 64 MMHG | WEIGHT: 202 LBS

## 2023-01-01 VITALS
TEMPERATURE: 97.7 F | DIASTOLIC BLOOD PRESSURE: 74 MMHG | HEART RATE: 91 BPM | SYSTOLIC BLOOD PRESSURE: 109 MMHG | RESPIRATION RATE: 16 BRPM | OXYGEN SATURATION: 99 %

## 2023-01-01 VITALS
DIASTOLIC BLOOD PRESSURE: 55 MMHG | HEART RATE: 90 BPM | TEMPERATURE: 97.8 F | SYSTOLIC BLOOD PRESSURE: 96 MMHG | OXYGEN SATURATION: 98 % | RESPIRATION RATE: 23 BRPM

## 2023-01-01 VITALS
DIASTOLIC BLOOD PRESSURE: 81 MMHG | BODY MASS INDEX: 30.29 KG/M2 | OXYGEN SATURATION: 98 % | HEART RATE: 88 BPM | RESPIRATION RATE: 16 BRPM | SYSTOLIC BLOOD PRESSURE: 131 MMHG | TEMPERATURE: 97.4 F | WEIGHT: 205.1 LBS

## 2023-01-01 VITALS
HEIGHT: 69 IN | TEMPERATURE: 97.7 F | DIASTOLIC BLOOD PRESSURE: 54 MMHG | WEIGHT: 180.1 LBS | BODY MASS INDEX: 26.67 KG/M2 | RESPIRATION RATE: 18 BRPM | HEART RATE: 90 BPM | SYSTOLIC BLOOD PRESSURE: 108 MMHG | OXYGEN SATURATION: 99 %

## 2023-01-01 VITALS
SYSTOLIC BLOOD PRESSURE: 121 MMHG | BODY MASS INDEX: 28.66 KG/M2 | HEART RATE: 114 BPM | TEMPERATURE: 99.1 F | RESPIRATION RATE: 20 BRPM | DIASTOLIC BLOOD PRESSURE: 74 MMHG | WEIGHT: 194.1 LBS | OXYGEN SATURATION: 99 %

## 2023-01-01 VITALS
TEMPERATURE: 97.3 F | WEIGHT: 200.2 LBS | HEART RATE: 97 BPM | BODY MASS INDEX: 30.09 KG/M2 | SYSTOLIC BLOOD PRESSURE: 126 MMHG | RESPIRATION RATE: 16 BRPM | OXYGEN SATURATION: 98 % | DIASTOLIC BLOOD PRESSURE: 62 MMHG

## 2023-01-01 DIAGNOSIS — C91.10 CLL (CHRONIC LYMPHOCYTIC LEUKEMIA) (H): Primary | ICD-10-CM

## 2023-01-01 DIAGNOSIS — C91.10 CLL (CHRONIC LYMPHOCYTIC LEUKEMIA) (H): ICD-10-CM

## 2023-01-01 DIAGNOSIS — N30.00 ACUTE CYSTITIS WITHOUT HEMATURIA: Primary | ICD-10-CM

## 2023-01-01 DIAGNOSIS — D50.0 IRON DEFICIENCY ANEMIA DUE TO CHRONIC BLOOD LOSS: ICD-10-CM

## 2023-01-01 DIAGNOSIS — R06.09 DYSPNEA ON EXERTION: ICD-10-CM

## 2023-01-01 DIAGNOSIS — I25.10 ATHEROSCLEROSIS OF NATIVE CORONARY ARTERY OF NATIVE HEART WITHOUT ANGINA PECTORIS: ICD-10-CM

## 2023-01-01 DIAGNOSIS — N18.31 STAGE 3A CHRONIC KIDNEY DISEASE (H): Primary | ICD-10-CM

## 2023-01-01 DIAGNOSIS — Z79.4 TYPE 2 DIABETES MELLITUS WITHOUT COMPLICATION, WITH LONG-TERM CURRENT USE OF INSULIN (H): ICD-10-CM

## 2023-01-01 DIAGNOSIS — E11.69 TYPE 2 DIABETES MELLITUS WITH OTHER SPECIFIED COMPLICATION, WITH LONG-TERM CURRENT USE OF INSULIN (H): ICD-10-CM

## 2023-01-01 DIAGNOSIS — R09.A2 GLOBUS SENSATION: Primary | ICD-10-CM

## 2023-01-01 DIAGNOSIS — R09.A2 GLOBUS SENSATION: ICD-10-CM

## 2023-01-01 DIAGNOSIS — K21.9 GASTROESOPHAGEAL REFLUX DISEASE, UNSPECIFIED WHETHER ESOPHAGITIS PRESENT: ICD-10-CM

## 2023-01-01 DIAGNOSIS — I42.9 CARDIOMYOPATHY, UNSPECIFIED TYPE (H): Primary | ICD-10-CM

## 2023-01-01 DIAGNOSIS — C18.4 MALIGNANT NEOPLASM OF TRANSVERSE COLON (H): ICD-10-CM

## 2023-01-01 DIAGNOSIS — H90.3 SENSORINEURAL HEARING LOSS, BILATERAL: ICD-10-CM

## 2023-01-01 DIAGNOSIS — Z79.4 TYPE 2 DIABETES MELLITUS WITH OTHER SPECIFIED COMPLICATION, WITH LONG-TERM CURRENT USE OF INSULIN (H): ICD-10-CM

## 2023-01-01 DIAGNOSIS — I10 HYPERTENSION GOAL BP (BLOOD PRESSURE) < 140/90: ICD-10-CM

## 2023-01-01 DIAGNOSIS — Z01.818 PREOP GENERAL PHYSICAL EXAM: Primary | ICD-10-CM

## 2023-01-01 DIAGNOSIS — I71.21 ANEURYSM OF ASCENDING AORTA WITHOUT RUPTURE (H): Primary | ICD-10-CM

## 2023-01-01 DIAGNOSIS — Z79.4 TYPE 2 DIABETES MELLITUS WITH OTHER SPECIFIED COMPLICATION, WITH LONG-TERM CURRENT USE OF INSULIN (H): Primary | ICD-10-CM

## 2023-01-01 DIAGNOSIS — M12.859: ICD-10-CM

## 2023-01-01 DIAGNOSIS — N30.91 HEMORRHAGIC CYSTITIS: ICD-10-CM

## 2023-01-01 DIAGNOSIS — D64.9 ANEMIA, UNSPECIFIED TYPE: ICD-10-CM

## 2023-01-01 DIAGNOSIS — R79.89 ELEVATED SERUM CREATININE: ICD-10-CM

## 2023-01-01 DIAGNOSIS — I48.91 ATRIAL FIBRILLATION, UNSPECIFIED TYPE (H): ICD-10-CM

## 2023-01-01 DIAGNOSIS — M79.672 FOOT PAIN, BILATERAL: ICD-10-CM

## 2023-01-01 DIAGNOSIS — E11.9 TYPE 2 DIABETES MELLITUS WITHOUT COMPLICATIONS (H): Primary | ICD-10-CM

## 2023-01-01 DIAGNOSIS — K92.1 GASTROINTESTINAL HEMORRHAGE WITH MELENA: ICD-10-CM

## 2023-01-01 DIAGNOSIS — I71.40 ABDOMINAL AORTIC ANEURYSM (AAA) WITHOUT RUPTURE, UNSPECIFIED PART (H): ICD-10-CM

## 2023-01-01 DIAGNOSIS — D69.6 THROMBOCYTOPENIA (H): ICD-10-CM

## 2023-01-01 DIAGNOSIS — C91.10 CHRONIC LYMPHOCYTIC LEUKEMIA OF B-CELL TYPE NOT HAVING ACHIEVED REMISSION (H): ICD-10-CM

## 2023-01-01 DIAGNOSIS — R06.02 SHORTNESS OF BREATH: ICD-10-CM

## 2023-01-01 DIAGNOSIS — C18.9 COLON CANCER (H): ICD-10-CM

## 2023-01-01 DIAGNOSIS — M13.0 POLYARTHRITIS: ICD-10-CM

## 2023-01-01 DIAGNOSIS — R42 DIZZINESS: ICD-10-CM

## 2023-01-01 DIAGNOSIS — D62 ANEMIA DUE TO BLOOD LOSS, ACUTE: ICD-10-CM

## 2023-01-01 DIAGNOSIS — R63.4 WEIGHT LOSS: ICD-10-CM

## 2023-01-01 DIAGNOSIS — Z53.9 DIAGNOSIS NOT YET DEFINED: Primary | ICD-10-CM

## 2023-01-01 DIAGNOSIS — R53.83 FATIGUE, UNSPECIFIED TYPE: ICD-10-CM

## 2023-01-01 DIAGNOSIS — M25.50 MULTIPLE JOINT PAIN: ICD-10-CM

## 2023-01-01 DIAGNOSIS — E11.69 TYPE 2 DIABETES MELLITUS WITH OTHER SPECIFIED COMPLICATION, WITH LONG-TERM CURRENT USE OF INSULIN (H): Primary | ICD-10-CM

## 2023-01-01 DIAGNOSIS — I42.9 CARDIOMYOPATHY, UNSPECIFIED TYPE (H): ICD-10-CM

## 2023-01-01 DIAGNOSIS — M19.90 SENILE ARTHRITIS: Primary | ICD-10-CM

## 2023-01-01 DIAGNOSIS — D64.9 ANEMIA: ICD-10-CM

## 2023-01-01 DIAGNOSIS — D50.0 IRON DEFICIENCY ANEMIA DUE TO CHRONIC BLOOD LOSS: Primary | ICD-10-CM

## 2023-01-01 DIAGNOSIS — I48.0 PAROXYSMAL ATRIAL FIBRILLATION (H): ICD-10-CM

## 2023-01-01 DIAGNOSIS — R79.9 ABNORMAL FINDING OF BLOOD CHEMISTRY, UNSPECIFIED: ICD-10-CM

## 2023-01-01 DIAGNOSIS — E78.49 OTHER HYPERLIPIDEMIA: ICD-10-CM

## 2023-01-01 DIAGNOSIS — R09.82 POSTNASAL DRIP: ICD-10-CM

## 2023-01-01 DIAGNOSIS — Z98.890 STATUS POST CORONARY ANGIOGRAM: ICD-10-CM

## 2023-01-01 DIAGNOSIS — I77.70 ARTERY DISSECTION (H): ICD-10-CM

## 2023-01-01 DIAGNOSIS — D72.829 LEUKOCYTOSIS, UNSPECIFIED TYPE: ICD-10-CM

## 2023-01-01 DIAGNOSIS — Z90.49 S/P RIGHT HEMICOLECTOMY: ICD-10-CM

## 2023-01-01 DIAGNOSIS — E11.9 TYPE 2 DIABETES MELLITUS WITHOUT COMPLICATIONS (H): ICD-10-CM

## 2023-01-01 DIAGNOSIS — M79.671 FOOT PAIN, BILATERAL: ICD-10-CM

## 2023-01-01 DIAGNOSIS — Z09 HOSPITAL DISCHARGE FOLLOW-UP: ICD-10-CM

## 2023-01-01 DIAGNOSIS — R53.83 OTHER FATIGUE: ICD-10-CM

## 2023-01-01 DIAGNOSIS — S01.81XD FACIAL LACERATION, SUBSEQUENT ENCOUNTER: ICD-10-CM

## 2023-01-01 DIAGNOSIS — D72.829 LEUKOCYTOSIS, UNSPECIFIED TYPE: Primary | ICD-10-CM
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
## 2023-01-01 DIAGNOSIS — K21.9 GASTROESOPHAGEAL REFLUX DISEASE, UNSPECIFIED WHETHER ESOPHAGITIS PRESENT: Primary | ICD-10-CM

## 2023-01-01 DIAGNOSIS — Z00.00 ENCOUNTER FOR MEDICARE ANNUAL WELLNESS EXAM: Primary | ICD-10-CM

## 2023-01-01 DIAGNOSIS — M25.50 MULTIPLE JOINT PAIN: Primary | ICD-10-CM

## 2023-01-01 DIAGNOSIS — D64.9 ANEMIA, UNSPECIFIED TYPE: Primary | ICD-10-CM

## 2023-01-01 DIAGNOSIS — C18.4 CANCER OF TRANSVERSE COLON (H): Primary | ICD-10-CM

## 2023-01-01 DIAGNOSIS — R30.0 DYSURIA: ICD-10-CM

## 2023-01-01 DIAGNOSIS — I25.10 ATHEROSCLEROSIS OF NATIVE CORONARY ARTERY OF NATIVE HEART WITHOUT ANGINA PECTORIS: Primary | ICD-10-CM

## 2023-01-01 DIAGNOSIS — E66.01 MORBIDLY OBESE (H): ICD-10-CM

## 2023-01-01 DIAGNOSIS — Z01.818 PREOP GENERAL PHYSICAL EXAM: ICD-10-CM

## 2023-01-01 DIAGNOSIS — M53.3 COCCYDYNIA: Primary | ICD-10-CM

## 2023-01-01 DIAGNOSIS — D62 ANEMIA DUE TO BLOOD LOSS, ACUTE: Primary | ICD-10-CM

## 2023-01-01 DIAGNOSIS — S01.81XA FACIAL LACERATION, INITIAL ENCOUNTER: ICD-10-CM

## 2023-01-01 DIAGNOSIS — N18.31 STAGE 3A CHRONIC KIDNEY DISEASE (H): ICD-10-CM

## 2023-01-01 DIAGNOSIS — C18.4 MALIGNANT NEOPLASM OF TRANSVERSE COLON (H): Primary | ICD-10-CM

## 2023-01-01 DIAGNOSIS — R73.9 ELEVATED BLOOD SUGAR: ICD-10-CM

## 2023-01-01 DIAGNOSIS — H26.9 BILATERAL INCIPIENT CATARACTS: ICD-10-CM

## 2023-01-01 DIAGNOSIS — Z23 NEED FOR INFLUENZA VACCINATION: ICD-10-CM

## 2023-01-01 DIAGNOSIS — R30.0 DYSURIA: Primary | ICD-10-CM

## 2023-01-01 DIAGNOSIS — E11.9 TYPE 2 DIABETES MELLITUS WITHOUT COMPLICATION, WITH LONG-TERM CURRENT USE OF INSULIN (H): ICD-10-CM

## 2023-01-01 LAB
ABO/RH(D): NORMAL
ADV 40+41 DNA STL QL NAA+NON-PROBE: NEGATIVE
ALBUMIN SERPL BCG-MCNC: 2.4 G/DL (ref 3.5–5.2)
ALBUMIN SERPL BCG-MCNC: 2.6 G/DL (ref 3.5–5.2)
ALBUMIN SERPL BCG-MCNC: 2.9 G/DL (ref 3.5–5.2)
ALBUMIN SERPL BCG-MCNC: 3.1 G/DL (ref 3.5–5.2)
ALBUMIN SERPL BCG-MCNC: 3.2 G/DL (ref 3.5–5.2)
ALBUMIN SERPL BCG-MCNC: 3.4 G/DL (ref 3.5–5.2)
ALBUMIN SERPL BCG-MCNC: 3.5 G/DL (ref 3.5–5.2)
ALBUMIN SERPL BCG-MCNC: 3.6 G/DL (ref 3.5–5.2)
ALBUMIN SERPL BCG-MCNC: 3.6 G/DL (ref 3.5–5.2)
ALBUMIN SERPL BCG-MCNC: 3.7 G/DL (ref 3.5–5.2)
ALBUMIN SERPL BCG-MCNC: 3.9 G/DL (ref 3.5–5.2)
ALBUMIN SERPL ELPH-MCNC: 2.9 G/DL (ref 3.7–5.1)
ALBUMIN UR-MCNC: 100 MG/DL
ALBUMIN UR-MCNC: 30 MG/DL
ALBUMIN UR-MCNC: 70 MG/DL
ALP SERPL-CCNC: 103 U/L (ref 40–129)
ALP SERPL-CCNC: 44 U/L (ref 40–129)
ALP SERPL-CCNC: 49 U/L (ref 40–129)
ALP SERPL-CCNC: 50 U/L (ref 40–129)
ALP SERPL-CCNC: 52 U/L (ref 40–129)
ALP SERPL-CCNC: 64 U/L (ref 40–129)
ALP SERPL-CCNC: 65 U/L (ref 40–129)
ALP SERPL-CCNC: 66 U/L (ref 40–129)
ALP SERPL-CCNC: 71 U/L (ref 40–129)
ALP SERPL-CCNC: 73 U/L (ref 40–129)
ALP SERPL-CCNC: 76 U/L (ref 40–129)
ALP SERPL-CCNC: 81 U/L (ref 40–129)
ALP SERPL-CCNC: 84 U/L (ref 40–129)
ALPHA1 GLOB SERPL ELPH-MCNC: 0.5 G/DL (ref 0.2–0.4)
ALPHA2 GLOB SERPL ELPH-MCNC: 1.1 G/DL (ref 0.5–0.9)
ALT SERPL W P-5'-P-CCNC: 107 U/L (ref 0–70)
ALT SERPL W P-5'-P-CCNC: 133 U/L (ref 0–70)
ALT SERPL W P-5'-P-CCNC: 22 U/L (ref 0–70)
ALT SERPL W P-5'-P-CCNC: 26 U/L (ref 10–50)
ALT SERPL W P-5'-P-CCNC: 27 U/L (ref 0–70)
ALT SERPL W P-5'-P-CCNC: 32 U/L (ref 0–70)
ALT SERPL W P-5'-P-CCNC: 46 U/L (ref 0–70)
ALT SERPL W P-5'-P-CCNC: 6 U/L (ref 0–70)
ALT SERPL W P-5'-P-CCNC: 7 U/L (ref 0–70)
ALT SERPL W P-5'-P-CCNC: 71 U/L (ref 0–70)
ALT SERPL W P-5'-P-CCNC: 8 U/L (ref 0–70)
ALT SERPL W P-5'-P-CCNC: 8 U/L (ref 0–70)
ALT SERPL W P-5'-P-CCNC: 97 U/L (ref 0–70)
ANA PAT SER IF-IMP: ABNORMAL
ANA SER QL IF: ABNORMAL
ANA SER QL IF: NEGATIVE
ANA TITR SER IF: ABNORMAL {TITER}
ANION GAP SERPL CALCULATED.3IONS-SCNC: 10 MMOL/L (ref 7–15)
ANION GAP SERPL CALCULATED.3IONS-SCNC: 11 MMOL/L (ref 7–15)
ANION GAP SERPL CALCULATED.3IONS-SCNC: 12 MMOL/L (ref 7–15)
ANION GAP SERPL CALCULATED.3IONS-SCNC: 13 MMOL/L (ref 7–15)
ANION GAP SERPL CALCULATED.3IONS-SCNC: 15 MMOL/L (ref 7–15)
ANION GAP SERPL CALCULATED.3IONS-SCNC: 16 MMOL/L (ref 7–15)
ANION GAP SERPL CALCULATED.3IONS-SCNC: 6 MMOL/L (ref 7–15)
ANION GAP SERPL CALCULATED.3IONS-SCNC: 7 MMOL/L (ref 7–15)
ANION GAP SERPL CALCULATED.3IONS-SCNC: 8 MMOL/L (ref 7–15)
ANION GAP SERPL CALCULATED.3IONS-SCNC: 9 MMOL/L (ref 7–15)
ANTIBODY SCREEN: NEGATIVE
APPEARANCE UR: ABNORMAL
APPEARANCE UR: ABNORMAL
APPEARANCE UR: CLEAR
AST SERPL W P-5'-P-CCNC: 10 U/L (ref 0–45)
AST SERPL W P-5'-P-CCNC: 13 U/L (ref 0–45)
AST SERPL W P-5'-P-CCNC: 17 U/L (ref 0–45)
AST SERPL W P-5'-P-CCNC: 21 U/L (ref 0–45)
AST SERPL W P-5'-P-CCNC: 21 U/L (ref 0–45)
AST SERPL W P-5'-P-CCNC: 24 U/L (ref 10–50)
AST SERPL W P-5'-P-CCNC: 26 U/L (ref 0–45)
AST SERPL W P-5'-P-CCNC: 31 U/L (ref 0–45)
AST SERPL W P-5'-P-CCNC: 32 U/L (ref 0–45)
AST SERPL W P-5'-P-CCNC: 58 U/L (ref 0–45)
AST SERPL W P-5'-P-CCNC: 8 U/L (ref 0–45)
AST SERPL W P-5'-P-CCNC: 8 U/L (ref 0–45)
AST SERPL W P-5'-P-CCNC: 80 U/L (ref 0–45)
ASTRO TYP 1-8 RNA STL QL NAA+NON-PROBE: NEGATIVE
ATRIAL RATE - MUSE: 93 BPM
ATRIAL RATE - MUSE: 96 BPM
ATRIAL RATE - MUSE: 99 BPM
B BURGDOR IGG+IGM SER QL: 0.12
B-GLOBULIN SERPL ELPH-MCNC: 0.6 G/DL (ref 0.6–1)
BACTERIA #/AREA URNS HPF: ABNORMAL /HPF
BACTERIA #/AREA URNS HPF: ABNORMAL /HPF
BACTERIA BLD CULT: NO GROWTH
BACTERIA BLD CULT: NO GROWTH
BACTERIA UR CULT: ABNORMAL
BACTERIA UR CULT: ABNORMAL
BASO+EOS+MONOS # BLD AUTO: ABNORMAL 10*3/UL
BASO+EOS+MONOS NFR BLD AUTO: ABNORMAL %
BASOPHILS # BLD AUTO: 0 10E3/UL (ref 0–0.2)
BASOPHILS # BLD AUTO: ABNORMAL 10*3/UL
BASOPHILS # BLD AUTO: ABNORMAL 10*3/UL
BASOPHILS # BLD MANUAL: 0 10E3/UL (ref 0–0.2)
BASOPHILS NFR BLD AUTO: 0 %
BASOPHILS NFR BLD AUTO: ABNORMAL %
BASOPHILS NFR BLD AUTO: ABNORMAL %
BASOPHILS NFR BLD MANUAL: 0 %
BILIRUB DIRECT SERPL-MCNC: <0.2 MG/DL (ref 0–0.3)
BILIRUB DIRECT SERPL-MCNC: <0.2 MG/DL (ref 0–0.3)
BILIRUB SERPL-MCNC: 0.3 MG/DL
BILIRUB SERPL-MCNC: 0.4 MG/DL
BILIRUB SERPL-MCNC: 0.5 MG/DL
BILIRUB SERPL-MCNC: 0.6 MG/DL
BILIRUB SERPL-MCNC: 0.7 MG/DL
BILIRUB UR QL STRIP: NEGATIVE
BLD PROD TYP BPU: NORMAL
BLOOD COMPONENT TYPE: NORMAL
BUN SERPL-MCNC: 12.9 MG/DL (ref 8–23)
BUN SERPL-MCNC: 14.7 MG/DL (ref 8–23)
BUN SERPL-MCNC: 15.6 MG/DL (ref 8–23)
BUN SERPL-MCNC: 16.4 MG/DL (ref 8–23)
BUN SERPL-MCNC: 16.5 MG/DL (ref 8–23)
BUN SERPL-MCNC: 16.7 MG/DL (ref 8–23)
BUN SERPL-MCNC: 16.9 MG/DL (ref 8–23)
BUN SERPL-MCNC: 17.6 MG/DL (ref 8–23)
BUN SERPL-MCNC: 17.6 MG/DL (ref 8–23)
BUN SERPL-MCNC: 17.7 MG/DL (ref 8–23)
BUN SERPL-MCNC: 17.8 MG/DL (ref 8–23)
BUN SERPL-MCNC: 18.1 MG/DL (ref 8–23)
BUN SERPL-MCNC: 18.8 MG/DL (ref 8–23)
BUN SERPL-MCNC: 20.1 MG/DL (ref 8–23)
BUN SERPL-MCNC: 20.2 MG/DL (ref 8–23)
BUN SERPL-MCNC: 21 MG/DL (ref 8–23)
BUN SERPL-MCNC: 21.3 MG/DL (ref 8–23)
BUN SERPL-MCNC: 21.8 MG/DL (ref 8–23)
BUN SERPL-MCNC: 22.7 MG/DL (ref 8–23)
BUN SERPL-MCNC: 23.4 MG/DL (ref 8–23)
BUN SERPL-MCNC: 23.8 MG/DL (ref 8–23)
BUN SERPL-MCNC: 24.7 MG/DL (ref 8–23)
BUN SERPL-MCNC: 24.9 MG/DL (ref 8–23)
BUN SERPL-MCNC: 25.5 MG/DL (ref 8–23)
BUN SERPL-MCNC: 27.2 MG/DL (ref 8–23)
BUN SERPL-MCNC: 28.1 MG/DL (ref 8–23)
BUN SERPL-MCNC: 29.7 MG/DL (ref 8–23)
BUN SERPL-MCNC: 31.4 MG/DL (ref 8–23)
BUN SERPL-MCNC: 31.9 MG/DL (ref 8–23)
BURR CELLS BLD QL SMEAR: ABNORMAL
C CAYETANENSIS DNA STL QL NAA+NON-PROBE: NEGATIVE
C DIFF TOX B STL QL: NEGATIVE
CA-I BLD-MCNC: 4.3 MG/DL (ref 4.4–5.2)
CALCIUM SERPL-MCNC: 7.6 MG/DL (ref 8.8–10.2)
CALCIUM SERPL-MCNC: 7.6 MG/DL (ref 8.8–10.2)
CALCIUM SERPL-MCNC: 7.7 MG/DL (ref 8.8–10.2)
CALCIUM SERPL-MCNC: 7.7 MG/DL (ref 8.8–10.2)
CALCIUM SERPL-MCNC: 8 MG/DL (ref 8.8–10.2)
CALCIUM SERPL-MCNC: 8.1 MG/DL (ref 8.8–10.2)
CALCIUM SERPL-MCNC: 8.3 MG/DL (ref 8.8–10.2)
CALCIUM SERPL-MCNC: 8.4 MG/DL (ref 8.8–10.2)
CALCIUM SERPL-MCNC: 8.4 MG/DL (ref 8.8–10.2)
CALCIUM SERPL-MCNC: 8.5 MG/DL (ref 8.8–10.2)
CALCIUM SERPL-MCNC: 8.5 MG/DL (ref 8.8–10.2)
CALCIUM SERPL-MCNC: 8.6 MG/DL (ref 8.8–10.2)
CALCIUM SERPL-MCNC: 8.6 MG/DL (ref 8.8–10.2)
CALCIUM SERPL-MCNC: 8.7 MG/DL (ref 8.8–10.2)
CALCIUM SERPL-MCNC: 8.8 MG/DL (ref 8.8–10.2)
CALCIUM SERPL-MCNC: 8.8 MG/DL (ref 8.8–10.2)
CALCIUM SERPL-MCNC: 8.9 MG/DL (ref 8.8–10.2)
CALCIUM SERPL-MCNC: 8.9 MG/DL (ref 8.8–10.2)
CALCIUM SERPL-MCNC: 9.1 MG/DL (ref 8.8–10.2)
CALCIUM SERPL-MCNC: 9.1 MG/DL (ref 8.8–10.2)
CALCIUM SERPL-MCNC: 9.2 MG/DL (ref 8.8–10.2)
CALCIUM SERPL-MCNC: 9.4 MG/DL (ref 8.8–10.2)
CALCIUM SERPL-MCNC: 9.7 MG/DL (ref 8.8–10.2)
CAMPYLOBACTER DNA SPEC NAA+PROBE: NEGATIVE
CCP AB SER IA-ACNC: 0.4 U/ML
CEA SERPL-MCNC: 10 NG/ML
CEA SERPL-MCNC: 18.9 NG/ML
CHLORIDE SERPL-SCNC: 100 MMOL/L (ref 98–107)
CHLORIDE SERPL-SCNC: 101 MMOL/L (ref 98–107)
CHLORIDE SERPL-SCNC: 102 MMOL/L (ref 98–107)
CHLORIDE SERPL-SCNC: 103 MMOL/L (ref 98–107)
CHLORIDE SERPL-SCNC: 104 MMOL/L (ref 98–107)
CHLORIDE SERPL-SCNC: 105 MMOL/L (ref 98–107)
CHLORIDE SERPL-SCNC: 96 MMOL/L (ref 98–107)
CHLORIDE SERPL-SCNC: 96 MMOL/L (ref 98–107)
CHLORIDE SERPL-SCNC: 97 MMOL/L (ref 98–107)
CHLORIDE SERPL-SCNC: 98 MMOL/L (ref 98–107)
CHLORIDE SERPL-SCNC: 98 MMOL/L (ref 98–107)
CHLORIDE SERPL-SCNC: 99 MMOL/L (ref 98–107)
CHOLEST SERPL-MCNC: 64 MG/DL
CK SERPL-CCNC: 17 U/L (ref 39–308)
CODING SYSTEM: NORMAL
COLONOSCOPY: NORMAL
COLOR UR AUTO: YELLOW
CREAT SERPL-MCNC: 0.95 MG/DL (ref 0.67–1.17)
CREAT SERPL-MCNC: 0.99 MG/DL (ref 0.67–1.17)
CREAT SERPL-MCNC: 1.11 MG/DL (ref 0.67–1.17)
CREAT SERPL-MCNC: 1.17 MG/DL (ref 0.67–1.17)
CREAT SERPL-MCNC: 1.2 MG/DL (ref 0.67–1.17)
CREAT SERPL-MCNC: 1.22 MG/DL (ref 0.67–1.17)
CREAT SERPL-MCNC: 1.28 MG/DL (ref 0.67–1.17)
CREAT SERPL-MCNC: 1.32 MG/DL (ref 0.67–1.17)
CREAT SERPL-MCNC: 1.32 MG/DL (ref 0.67–1.17)
CREAT SERPL-MCNC: 1.35 MG/DL (ref 0.67–1.17)
CREAT SERPL-MCNC: 1.37 MG/DL (ref 0.67–1.17)
CREAT SERPL-MCNC: 1.42 MG/DL (ref 0.67–1.17)
CREAT SERPL-MCNC: 1.42 MG/DL (ref 0.67–1.17)
CREAT SERPL-MCNC: 1.43 MG/DL (ref 0.67–1.17)
CREAT SERPL-MCNC: 1.43 MG/DL (ref 0.67–1.17)
CREAT SERPL-MCNC: 1.45 MG/DL (ref 0.67–1.17)
CREAT SERPL-MCNC: 1.46 MG/DL (ref 0.67–1.17)
CREAT SERPL-MCNC: 1.48 MG/DL (ref 0.67–1.17)
CREAT SERPL-MCNC: 1.48 MG/DL (ref 0.67–1.17)
CREAT SERPL-MCNC: 1.51 MG/DL (ref 0.67–1.17)
CREAT SERPL-MCNC: 1.52 MG/DL (ref 0.67–1.17)
CREAT SERPL-MCNC: 1.54 MG/DL (ref 0.67–1.17)
CREAT SERPL-MCNC: 1.55 MG/DL (ref 0.67–1.17)
CREAT SERPL-MCNC: 1.56 MG/DL (ref 0.67–1.17)
CREAT SERPL-MCNC: 1.57 MG/DL (ref 0.67–1.17)
CREAT SERPL-MCNC: 1.6 MG/DL (ref 0.67–1.17)
CREAT SERPL-MCNC: 1.63 MG/DL (ref 0.67–1.17)
CREAT SERPL-MCNC: 1.69 MG/DL (ref 0.67–1.17)
CREAT SERPL-MCNC: 1.75 MG/DL (ref 0.67–1.17)
CREAT UR-MCNC: 123 MG/DL
CREAT UR-MCNC: 231 MG/DL
CROSSMATCH: NORMAL
CRP SERPL-MCNC: 195.48 MG/L
CRP SERPL-MCNC: 27.1 MG/L
CRYPTOSP DNA STL QL NAA+NON-PROBE: NEGATIVE
DACRYOCYTES BLD QL SMEAR: SLIGHT
DEPRECATED HCO3 PLAS-SCNC: 19 MMOL/L (ref 22–29)
DEPRECATED HCO3 PLAS-SCNC: 20 MMOL/L (ref 22–29)
DEPRECATED HCO3 PLAS-SCNC: 21 MMOL/L (ref 22–29)
DEPRECATED HCO3 PLAS-SCNC: 22 MMOL/L (ref 22–29)
DEPRECATED HCO3 PLAS-SCNC: 23 MMOL/L (ref 22–29)
DEPRECATED HCO3 PLAS-SCNC: 24 MMOL/L (ref 22–29)
DEPRECATED HCO3 PLAS-SCNC: 25 MMOL/L (ref 22–29)
DEPRECATED HCO3 PLAS-SCNC: 25 MMOL/L (ref 22–29)
DEPRECATED HCO3 PLAS-SCNC: 26 MMOL/L (ref 22–29)
DEPRECATED HCO3 PLAS-SCNC: 29 MMOL/L (ref 22–29)
DIASTOLIC BLOOD PRESSURE - MUSE: NORMAL MMHG
E COLI O157 DNA STL QL NAA+NON-PROBE: NORMAL
E HISTOLYT DNA STL QL NAA+NON-PROBE: NEGATIVE
EAEC ASTA GENE ISLT QL NAA+PROBE: NEGATIVE
EC STX1+STX2 GENES STL QL NAA+NON-PROBE: NEGATIVE
EGFRCR SERPLBLD CKD-EPI 2021: 39 ML/MIN/1.73M2
EGFRCR SERPLBLD CKD-EPI 2021: 42 ML/MIN/1.73M2
EGFRCR SERPLBLD CKD-EPI 2021: 44 ML/MIN/1.73M2
EGFRCR SERPLBLD CKD-EPI 2021: 47 ML/MIN/1.73M2
EGFRCR SERPLBLD CKD-EPI 2021: 48 ML/MIN/1.73M2
EGFRCR SERPLBLD CKD-EPI 2021: 49 ML/MIN/1.73M2
EGFRCR SERPLBLD CKD-EPI 2021: 50 ML/MIN/1.73M2
EGFRCR SERPLBLD CKD-EPI 2021: 52 ML/MIN/1.73M2
EGFRCR SERPLBLD CKD-EPI 2021: 53 ML/MIN/1.73M2
EGFRCR SERPLBLD CKD-EPI 2021: 54 ML/MIN/1.73M2
EGFRCR SERPLBLD CKD-EPI 2021: 54 ML/MIN/1.73M2
EGFRCR SERPLBLD CKD-EPI 2021: 56 ML/MIN/1.73M2
EGFRCR SERPLBLD CKD-EPI 2021: 60 ML/MIN/1.73M2
EGFRCR SERPLBLD CKD-EPI 2021: 61 ML/MIN/1.73M2
EGFRCR SERPLBLD CKD-EPI 2021: 63 ML/MIN/1.73M2
EGFRCR SERPLBLD CKD-EPI 2021: 67 ML/MIN/1.73M2
EGFRCR SERPLBLD CKD-EPI 2021: 77 ML/MIN/1.73M2
EGFRCR SERPLBLD CKD-EPI 2021: 80 ML/MIN/1.73M2
ELLIPTOCYTES BLD QL SMEAR: ABNORMAL
ELLIPTOCYTES BLD QL SMEAR: SLIGHT
EOSINOPHIL # BLD AUTO: 0.1 10E3/UL (ref 0–0.7)
EOSINOPHIL # BLD AUTO: 0.2 10E3/UL (ref 0–0.7)
EOSINOPHIL # BLD AUTO: ABNORMAL 10*3/UL
EOSINOPHIL # BLD AUTO: ABNORMAL 10*3/UL
EOSINOPHIL # BLD MANUAL: 0 10E3/UL (ref 0–0.7)
EOSINOPHIL # BLD MANUAL: 0.1 10E3/UL (ref 0–0.7)
EOSINOPHIL # BLD MANUAL: 0.1 10E3/UL (ref 0–0.7)
EOSINOPHIL NFR BLD AUTO: 1 %
EOSINOPHIL NFR BLD AUTO: 2 %
EOSINOPHIL NFR BLD AUTO: ABNORMAL %
EOSINOPHIL NFR BLD AUTO: ABNORMAL %
EOSINOPHIL NFR BLD MANUAL: 0 %
EOSINOPHIL NFR BLD MANUAL: 1 %
EOSINOPHIL NFR BLD MANUAL: 1 %
EPEC EAE GENE STL QL NAA+NON-PROBE: NEGATIVE
ERYTHROCYTE [DISTWIDTH] IN BLOOD BY AUTOMATED COUNT: 14.4 % (ref 10–15)
ERYTHROCYTE [DISTWIDTH] IN BLOOD BY AUTOMATED COUNT: 14.6 % (ref 10–15)
ERYTHROCYTE [DISTWIDTH] IN BLOOD BY AUTOMATED COUNT: 15.1 % (ref 10–15)
ERYTHROCYTE [DISTWIDTH] IN BLOOD BY AUTOMATED COUNT: 15.2 % (ref 10–15)
ERYTHROCYTE [DISTWIDTH] IN BLOOD BY AUTOMATED COUNT: 15.2 % (ref 10–15)
ERYTHROCYTE [DISTWIDTH] IN BLOOD BY AUTOMATED COUNT: 15.7 % (ref 10–15)
ERYTHROCYTE [DISTWIDTH] IN BLOOD BY AUTOMATED COUNT: 15.8 % (ref 10–15)
ERYTHROCYTE [DISTWIDTH] IN BLOOD BY AUTOMATED COUNT: 15.9 % (ref 10–15)
ERYTHROCYTE [DISTWIDTH] IN BLOOD BY AUTOMATED COUNT: 16.2 % (ref 10–15)
ERYTHROCYTE [DISTWIDTH] IN BLOOD BY AUTOMATED COUNT: 17.2 % (ref 10–15)
ERYTHROCYTE [DISTWIDTH] IN BLOOD BY AUTOMATED COUNT: 17.2 % (ref 10–15)
ERYTHROCYTE [DISTWIDTH] IN BLOOD BY AUTOMATED COUNT: 17.5 % (ref 10–15)
ERYTHROCYTE [DISTWIDTH] IN BLOOD BY AUTOMATED COUNT: 18.1 % (ref 10–15)
ERYTHROCYTE [DISTWIDTH] IN BLOOD BY AUTOMATED COUNT: 18.1 % (ref 10–15)
ERYTHROCYTE [DISTWIDTH] IN BLOOD BY AUTOMATED COUNT: 18.2 % (ref 10–15)
ERYTHROCYTE [DISTWIDTH] IN BLOOD BY AUTOMATED COUNT: 18.4 % (ref 10–15)
ERYTHROCYTE [DISTWIDTH] IN BLOOD BY AUTOMATED COUNT: 18.6 % (ref 10–15)
ERYTHROCYTE [DISTWIDTH] IN BLOOD BY AUTOMATED COUNT: 18.7 % (ref 10–15)
ERYTHROCYTE [DISTWIDTH] IN BLOOD BY AUTOMATED COUNT: 19 % (ref 10–15)
ERYTHROCYTE [DISTWIDTH] IN BLOOD BY AUTOMATED COUNT: 19.1 % (ref 10–15)
ERYTHROCYTE [DISTWIDTH] IN BLOOD BY AUTOMATED COUNT: 19.3 % (ref 10–15)
ERYTHROCYTE [DISTWIDTH] IN BLOOD BY AUTOMATED COUNT: 19.3 % (ref 10–15)
ERYTHROCYTE [DISTWIDTH] IN BLOOD BY AUTOMATED COUNT: 19.4 % (ref 10–15)
ERYTHROCYTE [DISTWIDTH] IN BLOOD BY AUTOMATED COUNT: 19.5 % (ref 10–15)
ERYTHROCYTE [DISTWIDTH] IN BLOOD BY AUTOMATED COUNT: 19.7 % (ref 10–15)
ERYTHROCYTE [DISTWIDTH] IN BLOOD BY AUTOMATED COUNT: 23.1 % (ref 10–15)
ERYTHROCYTE [SEDIMENTATION RATE] IN BLOOD BY WESTERGREN METHOD: 109 MM/HR (ref 0–20)
ERYTHROCYTE [SEDIMENTATION RATE] IN BLOOD BY WESTERGREN METHOD: 66 MM/HR (ref 0–20)
ERYTHROCYTE [SEDIMENTATION RATE] IN BLOOD BY WESTERGREN METHOD: 86 MM/HR (ref 0–20)
ETEC LTA+ST1A+ST1B TOX ST NAA+NON-PROBE: NEGATIVE
ETHANOL SERPL-MCNC: 0.17 G/DL
FERRITIN SERPL-MCNC: 105 NG/ML (ref 31–409)
FERRITIN SERPL-MCNC: 50 NG/ML (ref 31–409)
FERRITIN SERPL-MCNC: 79 NG/ML (ref 31–409)
G LAMBLIA DNA STL QL NAA+NON-PROBE: NEGATIVE
GAMMA GLOB SERPL ELPH-MCNC: 0.6 G/DL (ref 0.7–1.6)
GFR SERPL CREATININE-BSD FRML MDRD: 41 ML/MIN/1.73M2
GFR SERPL CREATININE-BSD FRML MDRD: 43 ML/MIN/1.73M2
GFR SERPL CREATININE-BSD FRML MDRD: 44 ML/MIN/1.73M2
GFR SERPL CREATININE-BSD FRML MDRD: 45 ML/MIN/1.73M2
GFR SERPL CREATININE-BSD FRML MDRD: 45 ML/MIN/1.73M2
GFR SERPL CREATININE-BSD FRML MDRD: 46 ML/MIN/1.73M2
GFR SERPL CREATININE-BSD FRML MDRD: 46 ML/MIN/1.73M2
GFR SERPL CREATININE-BSD FRML MDRD: 47 ML/MIN/1.73M2
GFR SERPL CREATININE-BSD FRML MDRD: 48 ML/MIN/1.73M2
GFR SERPL CREATININE-BSD FRML MDRD: 50 ML/MIN/1.73M2
GFR SERPL CREATININE-BSD FRML MDRD: 50 ML/MIN/1.73M2
GLUCOSE BLDC GLUCOMTR-MCNC: 122 MG/DL (ref 70–99)
GLUCOSE BLDC GLUCOMTR-MCNC: 152 MG/DL (ref 70–99)
GLUCOSE BLDC GLUCOMTR-MCNC: 157 MG/DL (ref 70–99)
GLUCOSE BLDC GLUCOMTR-MCNC: 158 MG/DL (ref 70–99)
GLUCOSE BLDC GLUCOMTR-MCNC: 159 MG/DL (ref 70–99)
GLUCOSE BLDC GLUCOMTR-MCNC: 160 MG/DL (ref 70–99)
GLUCOSE BLDC GLUCOMTR-MCNC: 161 MG/DL (ref 70–99)
GLUCOSE BLDC GLUCOMTR-MCNC: 162 MG/DL (ref 70–99)
GLUCOSE BLDC GLUCOMTR-MCNC: 162 MG/DL (ref 70–99)
GLUCOSE BLDC GLUCOMTR-MCNC: 165 MG/DL (ref 70–99)
GLUCOSE BLDC GLUCOMTR-MCNC: 167 MG/DL (ref 70–99)
GLUCOSE BLDC GLUCOMTR-MCNC: 168 MG/DL (ref 70–99)
GLUCOSE BLDC GLUCOMTR-MCNC: 171 MG/DL (ref 70–99)
GLUCOSE BLDC GLUCOMTR-MCNC: 173 MG/DL (ref 70–99)
GLUCOSE BLDC GLUCOMTR-MCNC: 177 MG/DL (ref 70–99)
GLUCOSE BLDC GLUCOMTR-MCNC: 180 MG/DL (ref 70–99)
GLUCOSE BLDC GLUCOMTR-MCNC: 181 MG/DL (ref 70–99)
GLUCOSE BLDC GLUCOMTR-MCNC: 181 MG/DL (ref 70–99)
GLUCOSE BLDC GLUCOMTR-MCNC: 185 MG/DL (ref 70–99)
GLUCOSE BLDC GLUCOMTR-MCNC: 185 MG/DL (ref 70–99)
GLUCOSE BLDC GLUCOMTR-MCNC: 187 MG/DL (ref 70–99)
GLUCOSE BLDC GLUCOMTR-MCNC: 188 MG/DL (ref 70–99)
GLUCOSE BLDC GLUCOMTR-MCNC: 191 MG/DL (ref 70–99)
GLUCOSE BLDC GLUCOMTR-MCNC: 193 MG/DL (ref 70–99)
GLUCOSE BLDC GLUCOMTR-MCNC: 196 MG/DL (ref 70–99)
GLUCOSE BLDC GLUCOMTR-MCNC: 197 MG/DL (ref 70–99)
GLUCOSE BLDC GLUCOMTR-MCNC: 200 MG/DL (ref 70–99)
GLUCOSE BLDC GLUCOMTR-MCNC: 201 MG/DL (ref 70–99)
GLUCOSE BLDC GLUCOMTR-MCNC: 202 MG/DL (ref 70–99)
GLUCOSE BLDC GLUCOMTR-MCNC: 204 MG/DL (ref 70–99)
GLUCOSE BLDC GLUCOMTR-MCNC: 205 MG/DL (ref 70–99)
GLUCOSE BLDC GLUCOMTR-MCNC: 209 MG/DL (ref 70–99)
GLUCOSE BLDC GLUCOMTR-MCNC: 211 MG/DL (ref 70–99)
GLUCOSE BLDC GLUCOMTR-MCNC: 211 MG/DL (ref 70–99)
GLUCOSE BLDC GLUCOMTR-MCNC: 213 MG/DL (ref 70–99)
GLUCOSE BLDC GLUCOMTR-MCNC: 218 MG/DL (ref 70–99)
GLUCOSE BLDC GLUCOMTR-MCNC: 218 MG/DL (ref 70–99)
GLUCOSE BLDC GLUCOMTR-MCNC: 219 MG/DL (ref 70–99)
GLUCOSE BLDC GLUCOMTR-MCNC: 220 MG/DL (ref 70–99)
GLUCOSE BLDC GLUCOMTR-MCNC: 224 MG/DL (ref 70–99)
GLUCOSE BLDC GLUCOMTR-MCNC: 229 MG/DL (ref 70–99)
GLUCOSE BLDC GLUCOMTR-MCNC: 230 MG/DL (ref 70–99)
GLUCOSE BLDC GLUCOMTR-MCNC: 231 MG/DL (ref 70–99)
GLUCOSE BLDC GLUCOMTR-MCNC: 231 MG/DL (ref 70–99)
GLUCOSE BLDC GLUCOMTR-MCNC: 233 MG/DL (ref 70–99)
GLUCOSE BLDC GLUCOMTR-MCNC: 234 MG/DL (ref 70–99)
GLUCOSE BLDC GLUCOMTR-MCNC: 234 MG/DL (ref 70–99)
GLUCOSE BLDC GLUCOMTR-MCNC: 235 MG/DL (ref 70–99)
GLUCOSE BLDC GLUCOMTR-MCNC: 236 MG/DL (ref 70–99)
GLUCOSE BLDC GLUCOMTR-MCNC: 236 MG/DL (ref 70–99)
GLUCOSE BLDC GLUCOMTR-MCNC: 237 MG/DL (ref 70–99)
GLUCOSE BLDC GLUCOMTR-MCNC: 237 MG/DL (ref 70–99)
GLUCOSE BLDC GLUCOMTR-MCNC: 239 MG/DL (ref 70–99)
GLUCOSE BLDC GLUCOMTR-MCNC: 240 MG/DL (ref 70–99)
GLUCOSE BLDC GLUCOMTR-MCNC: 241 MG/DL (ref 70–99)
GLUCOSE BLDC GLUCOMTR-MCNC: 243 MG/DL (ref 70–99)
GLUCOSE BLDC GLUCOMTR-MCNC: 246 MG/DL (ref 70–99)
GLUCOSE BLDC GLUCOMTR-MCNC: 247 MG/DL (ref 70–99)
GLUCOSE BLDC GLUCOMTR-MCNC: 249 MG/DL (ref 70–99)
GLUCOSE BLDC GLUCOMTR-MCNC: 249 MG/DL (ref 70–99)
GLUCOSE BLDC GLUCOMTR-MCNC: 255 MG/DL (ref 70–99)
GLUCOSE BLDC GLUCOMTR-MCNC: 255 MG/DL (ref 70–99)
GLUCOSE BLDC GLUCOMTR-MCNC: 259 MG/DL (ref 70–99)
GLUCOSE BLDC GLUCOMTR-MCNC: 261 MG/DL (ref 70–99)
GLUCOSE BLDC GLUCOMTR-MCNC: 263 MG/DL (ref 70–99)
GLUCOSE BLDC GLUCOMTR-MCNC: 266 MG/DL (ref 70–99)
GLUCOSE BLDC GLUCOMTR-MCNC: 268 MG/DL (ref 70–99)
GLUCOSE BLDC GLUCOMTR-MCNC: 271 MG/DL (ref 70–99)
GLUCOSE BLDC GLUCOMTR-MCNC: 272 MG/DL (ref 70–99)
GLUCOSE BLDC GLUCOMTR-MCNC: 272 MG/DL (ref 70–99)
GLUCOSE BLDC GLUCOMTR-MCNC: 273 MG/DL (ref 70–99)
GLUCOSE BLDC GLUCOMTR-MCNC: 274 MG/DL (ref 70–99)
GLUCOSE BLDC GLUCOMTR-MCNC: 276 MG/DL (ref 70–99)
GLUCOSE BLDC GLUCOMTR-MCNC: 276 MG/DL (ref 70–99)
GLUCOSE BLDC GLUCOMTR-MCNC: 279 MG/DL (ref 70–99)
GLUCOSE BLDC GLUCOMTR-MCNC: 280 MG/DL (ref 70–99)
GLUCOSE BLDC GLUCOMTR-MCNC: 281 MG/DL (ref 70–99)
GLUCOSE BLDC GLUCOMTR-MCNC: 283 MG/DL (ref 70–99)
GLUCOSE BLDC GLUCOMTR-MCNC: 291 MG/DL (ref 70–99)
GLUCOSE BLDC GLUCOMTR-MCNC: 291 MG/DL (ref 70–99)
GLUCOSE BLDC GLUCOMTR-MCNC: 292 MG/DL (ref 70–99)
GLUCOSE BLDC GLUCOMTR-MCNC: 294 MG/DL (ref 70–99)
GLUCOSE BLDC GLUCOMTR-MCNC: 305 MG/DL (ref 70–99)
GLUCOSE BLDC GLUCOMTR-MCNC: 314 MG/DL (ref 70–99)
GLUCOSE BLDC GLUCOMTR-MCNC: 322 MG/DL (ref 70–99)
GLUCOSE BLDC GLUCOMTR-MCNC: 344 MG/DL (ref 70–99)
GLUCOSE SERPL-MCNC: 154 MG/DL (ref 70–99)
GLUCOSE SERPL-MCNC: 160 MG/DL (ref 70–99)
GLUCOSE SERPL-MCNC: 162 MG/DL (ref 70–99)
GLUCOSE SERPL-MCNC: 165 MG/DL (ref 70–99)
GLUCOSE SERPL-MCNC: 167 MG/DL (ref 70–99)
GLUCOSE SERPL-MCNC: 168 MG/DL (ref 70–99)
GLUCOSE SERPL-MCNC: 181 MG/DL (ref 70–99)
GLUCOSE SERPL-MCNC: 182 MG/DL (ref 70–99)
GLUCOSE SERPL-MCNC: 184 MG/DL (ref 70–99)
GLUCOSE SERPL-MCNC: 190 MG/DL (ref 70–99)
GLUCOSE SERPL-MCNC: 194 MG/DL (ref 70–99)
GLUCOSE SERPL-MCNC: 203 MG/DL (ref 70–99)
GLUCOSE SERPL-MCNC: 206 MG/DL (ref 70–99)
GLUCOSE SERPL-MCNC: 209 MG/DL (ref 70–99)
GLUCOSE SERPL-MCNC: 213 MG/DL (ref 70–99)
GLUCOSE SERPL-MCNC: 214 MG/DL (ref 70–99)
GLUCOSE SERPL-MCNC: 215 MG/DL (ref 70–99)
GLUCOSE SERPL-MCNC: 218 MG/DL (ref 70–99)
GLUCOSE SERPL-MCNC: 227 MG/DL (ref 70–99)
GLUCOSE SERPL-MCNC: 235 MG/DL (ref 70–99)
GLUCOSE SERPL-MCNC: 246 MG/DL (ref 70–99)
GLUCOSE SERPL-MCNC: 248 MG/DL (ref 70–99)
GLUCOSE SERPL-MCNC: 254 MG/DL (ref 70–99)
GLUCOSE SERPL-MCNC: 255 MG/DL (ref 70–99)
GLUCOSE SERPL-MCNC: 275 MG/DL (ref 70–99)
GLUCOSE SERPL-MCNC: 282 MG/DL (ref 70–99)
GLUCOSE SERPL-MCNC: 304 MG/DL (ref 70–99)
GLUCOSE SERPL-MCNC: 305 MG/DL (ref 70–99)
GLUCOSE SERPL-MCNC: 350 MG/DL (ref 70–99)
GLUCOSE SERPL-MCNC: 353 MG/DL (ref 70–99)
GLUCOSE UR STRIP-MCNC: >=1000 MG/DL
GLUCOSE UR STRIP-MCNC: NEGATIVE MG/DL
GLUCOSE UR STRIP-MCNC: NEGATIVE MG/DL
GRAN CASTS #/AREA URNS LPF: ABNORMAL /LPF
GRANULAR CAST: 4 /LPF
HAPTOGLOB SERPL-MCNC: 298 MG/DL (ref 32–197)
HBA1C MFR BLD: 7.1 % (ref 0–5.6)
HCO3 SERPL-SCNC: 22 MMOL/L (ref 22–29)
HCT VFR BLD AUTO: 22.5 % (ref 40–53)
HCT VFR BLD AUTO: 23.6 % (ref 40–53)
HCT VFR BLD AUTO: 25.1 % (ref 40–53)
HCT VFR BLD AUTO: 25.1 % (ref 40–53)
HCT VFR BLD AUTO: 25.2 % (ref 40–53)
HCT VFR BLD AUTO: 25.7 % (ref 40–53)
HCT VFR BLD AUTO: 25.7 % (ref 40–53)
HCT VFR BLD AUTO: 25.9 % (ref 40–53)
HCT VFR BLD AUTO: 26 % (ref 40–53)
HCT VFR BLD AUTO: 26.9 % (ref 40–53)
HCT VFR BLD AUTO: 27.3 % (ref 40–53)
HCT VFR BLD AUTO: 27.3 % (ref 40–53)
HCT VFR BLD AUTO: 27.4 % (ref 40–53)
HCT VFR BLD AUTO: 27.7 % (ref 40–53)
HCT VFR BLD AUTO: 28 % (ref 40–53)
HCT VFR BLD AUTO: 28.1 % (ref 40–53)
HCT VFR BLD AUTO: 28.2 % (ref 40–53)
HCT VFR BLD AUTO: 28.8 % (ref 40–53)
HCT VFR BLD AUTO: 29.5 % (ref 40–53)
HCT VFR BLD AUTO: 29.6 % (ref 40–53)
HCT VFR BLD AUTO: 30.1 % (ref 40–53)
HCT VFR BLD AUTO: 30.7 % (ref 40–53)
HCT VFR BLD AUTO: 30.8 % (ref 40–53)
HCT VFR BLD AUTO: 31.2 % (ref 40–53)
HCT VFR BLD AUTO: 33.6 % (ref 40–53)
HCT VFR BLD AUTO: 33.8 % (ref 40–53)
HDLC SERPL-MCNC: 14 MG/DL
HEMOCCULT STL QL: POSITIVE
HGB BLD-MCNC: 11.4 G/DL (ref 13.3–17.7)
HGB BLD-MCNC: 6.9 G/DL (ref 13.3–17.7)
HGB BLD-MCNC: 6.9 G/DL (ref 13.3–17.7)
HGB BLD-MCNC: 7.3 G/DL (ref 13.3–17.7)
HGB BLD-MCNC: 7.3 G/DL (ref 13.3–17.7)
HGB BLD-MCNC: 7.4 G/DL (ref 13.3–17.7)
HGB BLD-MCNC: 7.6 G/DL (ref 13.3–17.7)
HGB BLD-MCNC: 7.7 G/DL (ref 13.3–17.7)
HGB BLD-MCNC: 7.7 G/DL (ref 13.3–17.7)
HGB BLD-MCNC: 7.8 G/DL (ref 13.3–17.7)
HGB BLD-MCNC: 7.9 G/DL (ref 13.3–17.7)
HGB BLD-MCNC: 7.9 G/DL (ref 13.3–17.7)
HGB BLD-MCNC: 8 G/DL (ref 13.3–17.7)
HGB BLD-MCNC: 8.1 G/DL (ref 13.3–17.7)
HGB BLD-MCNC: 8.2 G/DL (ref 13.3–17.7)
HGB BLD-MCNC: 8.2 G/DL (ref 13.3–17.7)
HGB BLD-MCNC: 8.4 G/DL (ref 13.3–17.7)
HGB BLD-MCNC: 8.5 G/DL (ref 13.3–17.7)
HGB BLD-MCNC: 8.7 G/DL (ref 13.3–17.7)
HGB BLD-MCNC: 8.8 G/DL (ref 13.3–17.7)
HGB BLD-MCNC: 9 G/DL (ref 13.3–17.7)
HGB BLD-MCNC: 9.1 G/DL (ref 13.3–17.7)
HGB BLD-MCNC: 9.2 G/DL (ref 13.3–17.7)
HGB BLD-MCNC: 9.4 G/DL (ref 13.3–17.7)
HGB BLD-MCNC: 9.5 G/DL (ref 13.3–17.7)
HGB BLD-MCNC: 9.7 G/DL (ref 13.3–17.7)
HGB BLD-MCNC: 9.7 G/DL (ref 13.3–17.7)
HGB BLD-MCNC: 9.8 G/DL (ref 13.3–17.7)
HGB UR QL STRIP: ABNORMAL
HOLD SPECIMEN: NORMAL
IMM GRANULOCYTES # BLD: 0 10E3/UL
IMM GRANULOCYTES # BLD: 0 10E3/UL
IMM GRANULOCYTES # BLD: 0.1 10E3/UL
IMM GRANULOCYTES # BLD: 0.1 10E3/UL
IMM GRANULOCYTES # BLD: ABNORMAL 10*3/UL
IMM GRANULOCYTES # BLD: ABNORMAL 10*3/UL
IMM GRANULOCYTES NFR BLD: 0 %
IMM GRANULOCYTES NFR BLD: 1 %
IMM GRANULOCYTES NFR BLD: ABNORMAL %
IMM GRANULOCYTES NFR BLD: ABNORMAL %
INR PPP: 1.09 (ref 0.85–1.15)
INR PPP: 1.22 (ref 0.85–1.15)
INTERPRETATION ECG - MUSE: NORMAL
IRON BINDING CAPACITY (ROCHE): 273 UG/DL (ref 240–430)
IRON BINDING CAPACITY (ROCHE): 328 UG/DL (ref 240–430)
IRON SATN MFR SERPL: 10 % (ref 15–46)
IRON SATN MFR SERPL: 7 % (ref 15–46)
IRON SERPL-MCNC: 19 UG/DL (ref 61–157)
IRON SERPL-MCNC: 34 UG/DL (ref 61–157)
ISSUE DATE AND TIME: NORMAL
KETONES UR STRIP-MCNC: NEGATIVE MG/DL
LACTATE SERPL-SCNC: 0.9 MMOL/L (ref 0.7–2)
LACTATE SERPL-SCNC: 1.8 MMOL/L (ref 0.7–2)
LDH SERPL L TO P-CCNC: 134 U/L (ref 0–250)
LDH SERPL L TO P-CCNC: 149 U/L (ref 0–250)
LDH SERPL L TO P-CCNC: 158 U/L (ref 0–250)
LDH SERPL L TO P-CCNC: 162 U/L (ref 0–250)
LDH SERPL L TO P-CCNC: 179 U/L (ref 0–250)
LDH SERPL L TO P-CCNC: 193 U/L (ref 0–250)
LDLC SERPL CALC-MCNC: 27 MG/DL
LEUKOCYTE ESTERASE UR QL STRIP: ABNORMAL
LEUKOCYTE ESTERASE UR QL STRIP: ABNORMAL
LEUKOCYTE ESTERASE UR QL STRIP: NEGATIVE
LVEF ECHO: NORMAL
LYMPHOCYTES # BLD AUTO: 2.5 10E3/UL (ref 0.8–5.3)
LYMPHOCYTES # BLD AUTO: 4.4 10E3/UL (ref 0.8–5.3)
LYMPHOCYTES # BLD AUTO: 9.4 10E3/UL (ref 0.8–5.3)
LYMPHOCYTES # BLD AUTO: 9.8 10E3/UL (ref 0.8–5.3)
LYMPHOCYTES # BLD AUTO: ABNORMAL 10*3/UL
LYMPHOCYTES # BLD AUTO: ABNORMAL 10*3/UL
LYMPHOCYTES # BLD MANUAL: 10.1 10E3/UL (ref 0.8–5.3)
LYMPHOCYTES # BLD MANUAL: 12.8 10E3/UL (ref 0.8–5.3)
LYMPHOCYTES # BLD MANUAL: 17.7 10E3/UL (ref 0.8–5.3)
LYMPHOCYTES # BLD MANUAL: 6.4 10E3/UL (ref 0.8–5.3)
LYMPHOCYTES # BLD MANUAL: 7.3 10E3/UL (ref 0.8–5.3)
LYMPHOCYTES # BLD MANUAL: 7.8 10E3/UL (ref 0.8–5.3)
LYMPHOCYTES # BLD MANUAL: 8.6 10E3/UL (ref 0.8–5.3)
LYMPHOCYTES NFR BLD AUTO: 44 %
LYMPHOCYTES NFR BLD AUTO: 56 %
LYMPHOCYTES NFR BLD AUTO: 67 %
LYMPHOCYTES NFR BLD AUTO: 67 %
LYMPHOCYTES NFR BLD AUTO: ABNORMAL %
LYMPHOCYTES NFR BLD AUTO: ABNORMAL %
LYMPHOCYTES NFR BLD MANUAL: 52 %
LYMPHOCYTES NFR BLD MANUAL: 54 %
LYMPHOCYTES NFR BLD MANUAL: 60 %
LYMPHOCYTES NFR BLD MANUAL: 63 %
LYMPHOCYTES NFR BLD MANUAL: 70 %
LYMPHOCYTES NFR BLD MANUAL: 70 %
LYMPHOCYTES NFR BLD MANUAL: 72 %
Lab: NORMAL
M PROTEIN SERPL ELPH-MCNC: 0.1 G/DL
MAGNESIUM SERPL-MCNC: 1.6 MG/DL (ref 1.7–2.3)
MAGNESIUM SERPL-MCNC: 1.7 MG/DL (ref 1.7–2.3)
MAGNESIUM SERPL-MCNC: 1.7 MG/DL (ref 1.7–2.3)
MAGNESIUM SERPL-MCNC: 1.8 MG/DL (ref 1.7–2.3)
MAGNESIUM SERPL-MCNC: 1.9 MG/DL (ref 1.7–2.3)
MCH RBC QN AUTO: 24.6 PG (ref 26.5–33)
MCH RBC QN AUTO: 25.3 PG (ref 26.5–33)
MCH RBC QN AUTO: 25.4 PG (ref 26.5–33)
MCH RBC QN AUTO: 25.7 PG (ref 26.5–33)
MCH RBC QN AUTO: 26 PG (ref 26.5–33)
MCH RBC QN AUTO: 26.2 PG (ref 26.5–33)
MCH RBC QN AUTO: 26.3 PG (ref 26.5–33)
MCH RBC QN AUTO: 26.6 PG (ref 26.5–33)
MCH RBC QN AUTO: 26.8 PG (ref 26.5–33)
MCH RBC QN AUTO: 26.9 PG (ref 26.5–33)
MCH RBC QN AUTO: 27 PG (ref 26.5–33)
MCH RBC QN AUTO: 27.1 PG (ref 26.5–33)
MCH RBC QN AUTO: 27.2 PG (ref 26.5–33)
MCH RBC QN AUTO: 28.3 PG (ref 26.5–33)
MCH RBC QN AUTO: 29.2 PG (ref 26.5–33)
MCH RBC QN AUTO: 29.5 PG (ref 26.5–33)
MCH RBC QN AUTO: 30.5 PG (ref 26.5–33)
MCHC RBC AUTO-ENTMCNC: 29.2 G/DL (ref 31.5–36.5)
MCHC RBC AUTO-ENTMCNC: 29.3 G/DL (ref 31.5–36.5)
MCHC RBC AUTO-ENTMCNC: 29.9 G/DL (ref 31.5–36.5)
MCHC RBC AUTO-ENTMCNC: 30 G/DL (ref 31.5–36.5)
MCHC RBC AUTO-ENTMCNC: 30.2 G/DL (ref 31.5–36.5)
MCHC RBC AUTO-ENTMCNC: 30.4 G/DL (ref 31.5–36.5)
MCHC RBC AUTO-ENTMCNC: 30.5 G/DL (ref 31.5–36.5)
MCHC RBC AUTO-ENTMCNC: 30.6 G/DL (ref 31.5–36.5)
MCHC RBC AUTO-ENTMCNC: 30.7 G/DL (ref 31.5–36.5)
MCHC RBC AUTO-ENTMCNC: 30.7 G/DL (ref 31.5–36.5)
MCHC RBC AUTO-ENTMCNC: 30.8 G/DL (ref 31.5–36.5)
MCHC RBC AUTO-ENTMCNC: 30.9 G/DL (ref 31.5–36.5)
MCHC RBC AUTO-ENTMCNC: 30.9 G/DL (ref 31.5–36.5)
MCHC RBC AUTO-ENTMCNC: 31.1 G/DL (ref 31.5–36.5)
MCHC RBC AUTO-ENTMCNC: 31.1 G/DL (ref 31.5–36.5)
MCHC RBC AUTO-ENTMCNC: 31.2 G/DL (ref 31.5–36.5)
MCHC RBC AUTO-ENTMCNC: 31.2 G/DL (ref 31.5–36.5)
MCHC RBC AUTO-ENTMCNC: 31.3 G/DL (ref 31.5–36.5)
MCHC RBC AUTO-ENTMCNC: 31.5 G/DL (ref 31.5–36.5)
MCHC RBC AUTO-ENTMCNC: 31.5 G/DL (ref 31.5–36.5)
MCHC RBC AUTO-ENTMCNC: 31.8 G/DL (ref 31.5–36.5)
MCHC RBC AUTO-ENTMCNC: 31.9 G/DL (ref 31.5–36.5)
MCHC RBC AUTO-ENTMCNC: 31.9 G/DL (ref 31.5–36.5)
MCHC RBC AUTO-ENTMCNC: 32.3 G/DL (ref 31.5–36.5)
MCHC RBC AUTO-ENTMCNC: 32.3 G/DL (ref 31.5–36.5)
MCHC RBC AUTO-ENTMCNC: 33.7 G/DL (ref 31.5–36.5)
MCV RBC AUTO: 83 FL (ref 78–100)
MCV RBC AUTO: 84 FL (ref 78–100)
MCV RBC AUTO: 85 FL (ref 78–100)
MCV RBC AUTO: 86 FL (ref 78–100)
MCV RBC AUTO: 87 FL (ref 78–100)
MCV RBC AUTO: 88 FL (ref 78–100)
MCV RBC AUTO: 88 FL (ref 78–100)
MCV RBC AUTO: 90 FL (ref 78–100)
MCV RBC AUTO: 92 FL (ref 78–100)
MCV RBC AUTO: 94 FL (ref 78–100)
METAMYELOCYTES # BLD MANUAL: 0.2 10E3/UL
METAMYELOCYTES NFR BLD MANUAL: 2 %
MICROALBUMIN UR-MCNC: 39 MG/L
MICROALBUMIN UR-MCNC: 51.8 MG/L
MICROALBUMIN/CREAT UR: 22.42 MG/G CR (ref 0–17)
MICROALBUMIN/CREAT UR: 31.71 MG/G CR (ref 0–17)
MISCELLANEOUS TEST 1 (ARUP): NORMAL
MONOCYTES # BLD AUTO: 0.2 10E3/UL (ref 0–1.3)
MONOCYTES # BLD AUTO: 0.4 10E3/UL (ref 0–1.3)
MONOCYTES # BLD AUTO: 0.4 10E3/UL (ref 0–1.3)
MONOCYTES # BLD AUTO: 0.5 10E3/UL (ref 0–1.3)
MONOCYTES # BLD AUTO: ABNORMAL 10*3/UL
MONOCYTES # BLD AUTO: ABNORMAL 10*3/UL
MONOCYTES # BLD MANUAL: 0.1 10E3/UL (ref 0–1.3)
MONOCYTES # BLD MANUAL: 0.1 10E3/UL (ref 0–1.3)
MONOCYTES # BLD MANUAL: 0.2 10E3/UL (ref 0–1.3)
MONOCYTES # BLD MANUAL: 0.3 10E3/UL (ref 0–1.3)
MONOCYTES # BLD MANUAL: 0.4 10E3/UL (ref 0–1.3)
MONOCYTES # BLD MANUAL: 0.4 10E3/UL (ref 0–1.3)
MONOCYTES # BLD MANUAL: 0.6 10E3/UL (ref 0–1.3)
MONOCYTES NFR BLD AUTO: 2 %
MONOCYTES NFR BLD AUTO: 4 %
MONOCYTES NFR BLD AUTO: 4 %
MONOCYTES NFR BLD AUTO: 5 %
MONOCYTES NFR BLD AUTO: ABNORMAL %
MONOCYTES NFR BLD AUTO: ABNORMAL %
MONOCYTES NFR BLD MANUAL: 1 %
MONOCYTES NFR BLD MANUAL: 2 %
MONOCYTES NFR BLD MANUAL: 2 %
MONOCYTES NFR BLD MANUAL: 3 %
MONOCYTES NFR BLD MANUAL: 5 %
MUCOUS THREADS #/AREA URNS LPF: PRESENT /LPF
NEUTROPHILS # BLD AUTO: 2.8 10E3/UL (ref 1.6–8.3)
NEUTROPHILS # BLD AUTO: 2.9 10E3/UL (ref 1.6–8.3)
NEUTROPHILS # BLD AUTO: 3.7 10E3/UL (ref 1.6–8.3)
NEUTROPHILS # BLD AUTO: 4.1 10E3/UL (ref 1.6–8.3)
NEUTROPHILS # BLD AUTO: ABNORMAL 10*3/UL
NEUTROPHILS # BLD AUTO: ABNORMAL 10*3/UL
NEUTROPHILS # BLD MANUAL: 3.6 10E3/UL (ref 1.6–8.3)
NEUTROPHILS # BLD MANUAL: 4.4 10E3/UL (ref 1.6–8.3)
NEUTROPHILS # BLD MANUAL: 4.9 10E3/UL (ref 1.6–8.3)
NEUTROPHILS # BLD MANUAL: 5 10E3/UL (ref 1.6–8.3)
NEUTROPHILS # BLD MANUAL: 5.3 10E3/UL (ref 1.6–8.3)
NEUTROPHILS # BLD MANUAL: 6.2 10E3/UL (ref 1.6–8.3)
NEUTROPHILS # BLD MANUAL: 7.3 10E3/UL (ref 1.6–8.3)
NEUTROPHILS NFR BLD AUTO: 27 %
NEUTROPHILS NFR BLD AUTO: 29 %
NEUTROPHILS NFR BLD AUTO: 37 %
NEUTROPHILS NFR BLD AUTO: 50 %
NEUTROPHILS NFR BLD AUTO: ABNORMAL %
NEUTROPHILS NFR BLD AUTO: ABNORMAL %
NEUTROPHILS NFR BLD MANUAL: 26 %
NEUTROPHILS NFR BLD MANUAL: 27 %
NEUTROPHILS NFR BLD MANUAL: 29 %
NEUTROPHILS NFR BLD MANUAL: 36 %
NEUTROPHILS NFR BLD MANUAL: 36 %
NEUTROPHILS NFR BLD MANUAL: 43 %
NEUTROPHILS NFR BLD MANUAL: 43 %
NITRATE UR QL: NEGATIVE
NITRATE UR QL: POSITIVE
NITRATE UR QL: POSITIVE
NONHDLC SERPL-MCNC: 50 MG/DL
NOROVIRUS GI+II RNA STL QL NAA+NON-PROBE: NEGATIVE
NRBC # BLD AUTO: 0 10E3/UL
NRBC BLD AUTO-RTO: 0 /100
NT-PROBNP SERPL-MCNC: 1262 PG/ML (ref 0–1800)
NT-PROBNP SERPL-MCNC: 588 PG/ML (ref 0–1800)
NT-PROBNP SERPL-MCNC: 798 PG/ML (ref 0–1800)
P AXIS - MUSE: 47 DEGREES
P AXIS - MUSE: 56 DEGREES
P AXIS - MUSE: 59 DEGREES
P SHIGELLOIDES DNA STL QL NAA+NON-PROBE: NEGATIVE
PATH REPORT.COMMENTS IMP SPEC: ABNORMAL
PATH REPORT.COMMENTS IMP SPEC: YES
PATH REPORT.COMMENTS IMP SPEC: YES
PATH REPORT.FINAL DX SPEC: ABNORMAL
PATH REPORT.FINAL DX SPEC: ABNORMAL
PATH REPORT.GROSS SPEC: ABNORMAL
PATH REPORT.GROSS SPEC: ABNORMAL
PATH REPORT.MICROSCOPIC SPEC OTHER STN: ABNORMAL
PATH REPORT.MICROSCOPIC SPEC OTHER STN: ABNORMAL
PATH REPORT.RELEVANT HX SPEC: ABNORMAL
PATH REPORT.RELEVANT HX SPEC: ABNORMAL
PATH REV: ABNORMAL
PATHOLOGY SYNOPTIC REPORT: ABNORMAL
PATHOLOGY SYNOPTIC REPORT: ABNORMAL
PERFORMING LABORATORY: NORMAL
PH UR STRIP: 5.5 [PH] (ref 5–7)
PH UR STRIP: 5.5 [PH] (ref 5–7)
PH UR STRIP: 6 [PH] (ref 5–7)
PHOSPHATE SERPL-MCNC: 2.2 MG/DL (ref 2.5–4.5)
PHOSPHATE SERPL-MCNC: 2.4 MG/DL (ref 2.5–4.5)
PHOSPHATE SERPL-MCNC: 2.8 MG/DL (ref 2.5–4.5)
PHOSPHATE SERPL-MCNC: 3.1 MG/DL (ref 2.5–4.5)
PHOSPHATE SERPL-MCNC: 3.1 MG/DL (ref 2.5–4.5)
PHOSPHATE SERPL-MCNC: 4.1 MG/DL (ref 2.5–4.5)
PHOTO IMAGE: ABNORMAL
PHOTO IMAGE: ABNORMAL
PLAT MORPH BLD: ABNORMAL
PLAT MORPH BLD: NORMAL
PLATELET # BLD AUTO: 100 10E3/UL (ref 150–450)
PLATELET # BLD AUTO: 101 10E3/UL (ref 150–450)
PLATELET # BLD AUTO: 110 10E3/UL (ref 150–450)
PLATELET # BLD AUTO: 117 10E3/UL (ref 150–450)
PLATELET # BLD AUTO: 145 10E3/UL (ref 150–450)
PLATELET # BLD AUTO: 166 10E3/UL (ref 150–450)
PLATELET # BLD AUTO: 65 10E3/UL (ref 150–450)
PLATELET # BLD AUTO: 66 10E3/UL (ref 150–450)
PLATELET # BLD AUTO: 67 10E3/UL (ref 150–450)
PLATELET # BLD AUTO: 67 10E3/UL (ref 150–450)
PLATELET # BLD AUTO: 70 10E3/UL (ref 150–450)
PLATELET # BLD AUTO: 70 10E3/UL (ref 150–450)
PLATELET # BLD AUTO: 72 10E3/UL (ref 150–450)
PLATELET # BLD AUTO: 72 10E3/UL (ref 150–450)
PLATELET # BLD AUTO: 73 10E3/UL (ref 150–450)
PLATELET # BLD AUTO: 75 10E3/UL (ref 150–450)
PLATELET # BLD AUTO: 77 10E3/UL (ref 150–450)
PLATELET # BLD AUTO: 77 10E3/UL (ref 150–450)
PLATELET # BLD AUTO: 79 10E3/UL (ref 150–450)
PLATELET # BLD AUTO: 81 10E3/UL (ref 150–450)
PLATELET # BLD AUTO: 81 10E3/UL (ref 150–450)
PLATELET # BLD AUTO: 82 10E3/UL (ref 150–450)
PLATELET # BLD AUTO: 83 10E3/UL (ref 150–450)
PLATELET # BLD AUTO: 84 10E3/UL (ref 150–450)
PLATELET # BLD AUTO: 84 10E3/UL (ref 150–450)
PLATELET # BLD AUTO: 92 10E3/UL (ref 150–450)
PLATELET # BLD AUTO: 96 10E3/UL (ref 150–450)
PLATELET # BLD AUTO: 98 10E3/UL (ref 150–450)
POTASSIUM SERPL-SCNC: 3 MMOL/L (ref 3.4–5.3)
POTASSIUM SERPL-SCNC: 3.1 MMOL/L (ref 3.4–5.3)
POTASSIUM SERPL-SCNC: 3.2 MMOL/L (ref 3.4–5.3)
POTASSIUM SERPL-SCNC: 3.5 MMOL/L (ref 3.4–5.3)
POTASSIUM SERPL-SCNC: 3.5 MMOL/L (ref 3.4–5.3)
POTASSIUM SERPL-SCNC: 3.6 MMOL/L (ref 3.4–5.3)
POTASSIUM SERPL-SCNC: 3.7 MMOL/L (ref 3.4–5.3)
POTASSIUM SERPL-SCNC: 3.8 MMOL/L (ref 3.4–5.3)
POTASSIUM SERPL-SCNC: 3.8 MMOL/L (ref 3.4–5.3)
POTASSIUM SERPL-SCNC: 3.9 MMOL/L (ref 3.4–5.3)
POTASSIUM SERPL-SCNC: 3.9 MMOL/L (ref 3.4–5.3)
POTASSIUM SERPL-SCNC: 4.1 MMOL/L (ref 3.4–5.3)
POTASSIUM SERPL-SCNC: 4.1 MMOL/L (ref 3.4–5.3)
POTASSIUM SERPL-SCNC: 4.2 MMOL/L (ref 3.4–5.3)
POTASSIUM SERPL-SCNC: 4.3 MMOL/L (ref 3.4–5.3)
POTASSIUM SERPL-SCNC: 4.4 MMOL/L (ref 3.4–5.3)
POTASSIUM SERPL-SCNC: 4.4 MMOL/L (ref 3.4–5.3)
POTASSIUM SERPL-SCNC: 4.5 MMOL/L (ref 3.4–5.3)
POTASSIUM SERPL-SCNC: 4.6 MMOL/L (ref 3.4–5.3)
POTASSIUM SERPL-SCNC: 4.7 MMOL/L (ref 3.4–5.3)
POTASSIUM SERPL-SCNC: 4.7 MMOL/L (ref 3.4–5.3)
POTASSIUM SERPL-SCNC: 5 MMOL/L (ref 3.4–5.3)
POTASSIUM SERPL-SCNC: 5 MMOL/L (ref 3.4–5.3)
POTASSIUM SERPL-SCNC: 5.1 MMOL/L (ref 3.4–5.3)
POTASSIUM SERPL-SCNC: 5.1 MMOL/L (ref 3.4–5.3)
PR INTERVAL - MUSE: 186 MS
PR INTERVAL - MUSE: 208 MS
PR INTERVAL - MUSE: 208 MS
PREALB SERPL IA-MCNC: 8 MG/DL (ref 15–45)
PREALB SERPL IA-MCNC: 9 MG/DL (ref 15–45)
PROT PATTERN SERPL ELPH-IMP: ABNORMAL
PROT SERPL-MCNC: 4.5 G/DL (ref 6.4–8.3)
PROT SERPL-MCNC: 4.5 G/DL (ref 6.4–8.3)
PROT SERPL-MCNC: 4.8 G/DL (ref 6.4–8.3)
PROT SERPL-MCNC: 5.5 G/DL (ref 6.4–8.3)
PROT SERPL-MCNC: 5.8 G/DL (ref 6.4–8.3)
PROT SERPL-MCNC: 6.1 G/DL (ref 6.4–8.3)
PROT SERPL-MCNC: 6.2 G/DL (ref 6.4–8.3)
PROT SERPL-MCNC: 6.3 G/DL (ref 6.4–8.3)
PROT SERPL-MCNC: 6.4 G/DL (ref 6.4–8.3)
PROT SERPL-MCNC: 6.4 G/DL (ref 6.4–8.3)
PROT SERPL-MCNC: 7.4 G/DL (ref 6.4–8.3)
QRS DURATION - MUSE: 70 MS
QRS DURATION - MUSE: 70 MS
QRS DURATION - MUSE: 72 MS
QT - MUSE: 326 MS
QT - MUSE: 338 MS
QT - MUSE: 350 MS
QTC - MUSE: 418 MS
QTC - MUSE: 420 MS
QTC - MUSE: 442 MS
R AXIS - MUSE: 10 DEGREES
R AXIS - MUSE: 12 DEGREES
R AXIS - MUSE: 14 DEGREES
RBC # BLD AUTO: 2.63 10E6/UL (ref 4.4–5.9)
RBC # BLD AUTO: 2.79 10E6/UL (ref 4.4–5.9)
RBC # BLD AUTO: 2.93 10E6/UL (ref 4.4–5.9)
RBC # BLD AUTO: 2.94 10E6/UL (ref 4.4–5.9)
RBC # BLD AUTO: 2.94 10E6/UL (ref 4.4–5.9)
RBC # BLD AUTO: 2.98 10E6/UL (ref 4.4–5.9)
RBC # BLD AUTO: 3.01 10E6/UL (ref 4.4–5.9)
RBC # BLD AUTO: 3.01 10E6/UL (ref 4.4–5.9)
RBC # BLD AUTO: 3.05 10E6/UL (ref 4.4–5.9)
RBC # BLD AUTO: 3.09 10E6/UL (ref 4.4–5.9)
RBC # BLD AUTO: 3.12 10E6/UL (ref 4.4–5.9)
RBC # BLD AUTO: 3.15 10E6/UL (ref 4.4–5.9)
RBC # BLD AUTO: 3.2 10E6/UL (ref 4.4–5.9)
RBC # BLD AUTO: 3.21 10E6/UL (ref 4.4–5.9)
RBC # BLD AUTO: 3.23 10E6/UL (ref 4.4–5.9)
RBC # BLD AUTO: 3.24 10E6/UL (ref 4.4–5.9)
RBC # BLD AUTO: 3.25 10E6/UL (ref 4.4–5.9)
RBC # BLD AUTO: 3.32 10E6/UL (ref 4.4–5.9)
RBC # BLD AUTO: 3.36 10E6/UL (ref 4.4–5.9)
RBC # BLD AUTO: 3.4 10E6/UL (ref 4.4–5.9)
RBC # BLD AUTO: 3.5 10E6/UL (ref 4.4–5.9)
RBC # BLD AUTO: 3.58 10E6/UL (ref 4.4–5.9)
RBC # BLD AUTO: 3.61 10E6/UL (ref 4.4–5.9)
RBC # BLD AUTO: 3.62 10E6/UL (ref 4.4–5.9)
RBC # BLD AUTO: 3.74 10E6/UL (ref 4.4–5.9)
RBC # BLD AUTO: 3.86 10E6/UL (ref 4.4–5.9)
RBC #/AREA URNS AUTO: ABNORMAL /HPF
RBC MORPH BLD: ABNORMAL
RBC MORPH BLD: NORMAL
RBC URINE: 113 /HPF
RBC URINE: 48 /HPF
RETICS # AUTO: 0.06 10E6/UL (ref 0.03–0.1)
RETICS/RBC NFR AUTO: 2.2 % (ref 0.5–2)
RETINOPATHY: NEGATIVE
RETINOPATHY: NORMAL
RHEUMATOID FACT SER NEPH-ACNC: 92 IU/ML
RVA RNA STL QL NAA+NON-PROBE: NEGATIVE
SALMONELLA SP RPOD STL QL NAA+PROBE: NEGATIVE
SAPO I+II+IV+V RNA STL QL NAA+NON-PROBE: NEGATIVE
SARS-COV-2 RNA RESP QL NAA+PROBE: NEGATIVE
SHIGELLA SP+EIEC IPAH ST NAA+NON-PROBE: NEGATIVE
SMUDGE CELLS BLD QL SMEAR: PRESENT
SODIUM SERPL-SCNC: 129 MMOL/L (ref 136–145)
SODIUM SERPL-SCNC: 130 MMOL/L (ref 136–145)
SODIUM SERPL-SCNC: 131 MMOL/L (ref 135–145)
SODIUM SERPL-SCNC: 131 MMOL/L (ref 136–145)
SODIUM SERPL-SCNC: 131 MMOL/L (ref 136–145)
SODIUM SERPL-SCNC: 132 MMOL/L (ref 136–145)
SODIUM SERPL-SCNC: 133 MMOL/L (ref 135–145)
SODIUM SERPL-SCNC: 133 MMOL/L (ref 136–145)
SODIUM SERPL-SCNC: 134 MMOL/L (ref 136–145)
SODIUM SERPL-SCNC: 134 MMOL/L (ref 136–145)
SODIUM SERPL-SCNC: 135 MMOL/L (ref 135–145)
SODIUM SERPL-SCNC: 135 MMOL/L (ref 135–145)
SODIUM SERPL-SCNC: 135 MMOL/L (ref 136–145)
SODIUM SERPL-SCNC: 136 MMOL/L (ref 136–145)
SODIUM SERPL-SCNC: 137 MMOL/L (ref 135–145)
SODIUM SERPL-SCNC: 137 MMOL/L (ref 136–145)
SODIUM SERPL-SCNC: 138 MMOL/L (ref 136–145)
SODIUM SERPL-SCNC: 138 MMOL/L (ref 136–145)
SODIUM SERPL-SCNC: 140 MMOL/L (ref 136–145)
SP GR UR STRIP: 1.02 (ref 1–1.03)
SPECIMEN EXPIRATION DATE: NORMAL
SPECIMEN STATUS: NORMAL
SYSTOLIC BLOOD PRESSURE - MUSE: NORMAL MMHG
T AXIS - MUSE: 19 DEGREES
T AXIS - MUSE: 24 DEGREES
T AXIS - MUSE: 50 DEGREES
TEST NAME: NORMAL
TOTAL PROTEIN SERUM FOR ELP: 5.7 G/DL (ref 6.4–8.3)
TRIGL SERPL-MCNC: 115 MG/DL
TROPONIN T SERPL HS-MCNC: 19 NG/L
TROPONIN T SERPL HS-MCNC: 20 NG/L
TROPONIN T SERPL HS-MCNC: 23 NG/L
TROPONIN T SERPL HS-MCNC: 63 NG/L
TROPONIN T SERPL HS-MCNC: 68 NG/L
TSH SERPL DL<=0.005 MIU/L-ACNC: 2.77 UIU/ML (ref 0.3–4.2)
UNIT ABO/RH: NORMAL
UNIT NUMBER: NORMAL
UNIT STATUS: NORMAL
UNIT TYPE ISBT: 2800
UNIT TYPE ISBT: 8400
URATE SERPL-MCNC: 4 MG/DL (ref 3.4–7)
UROBILINOGEN UR STRIP-ACNC: 0.2 E.U./DL
UROBILINOGEN UR STRIP-MCNC: NORMAL MG/DL
UROBILINOGEN UR STRIP-MCNC: NORMAL MG/DL
V CHOLERAE DNA SPEC QL NAA+PROBE: NEGATIVE
VENTRICULAR RATE- MUSE: 93 BPM
VENTRICULAR RATE- MUSE: 96 BPM
VENTRICULAR RATE- MUSE: 99 BPM
VIBRIO DNA SPEC NAA+PROBE: NEGATIVE
WBC # BLD AUTO: 10.5 10E3/UL (ref 4–11)
WBC # BLD AUTO: 12.1 10E3/UL (ref 4–11)
WBC # BLD AUTO: 12.3 10E3/UL (ref 4–11)
WBC # BLD AUTO: 12.6 10E3/UL (ref 4–11)
WBC # BLD AUTO: 13 10E3/UL (ref 4–11)
WBC # BLD AUTO: 13.7 10E3/UL (ref 4–11)
WBC # BLD AUTO: 13.8 10E3/UL (ref 4–11)
WBC # BLD AUTO: 14 10E3/UL (ref 4–11)
WBC # BLD AUTO: 14.4 10E3/UL (ref 4–11)
WBC # BLD AUTO: 14.4 10E3/UL (ref 4–11)
WBC # BLD AUTO: 18.4 10E3/UL (ref 4–11)
WBC # BLD AUTO: 19.1 10E3/UL (ref 4–11)
WBC # BLD AUTO: 25.3 10E3/UL (ref 4–11)
WBC # BLD AUTO: 27.1 10E3/UL (ref 4–11)
WBC # BLD AUTO: 5.7 10E3/UL (ref 4–11)
WBC # BLD AUTO: 5.7 10E3/UL (ref 4–11)
WBC # BLD AUTO: 6.3 10E3/UL (ref 4–11)
WBC # BLD AUTO: 7.5 10E3/UL (ref 4–11)
WBC # BLD AUTO: 7.6 10E3/UL (ref 4–11)
WBC # BLD AUTO: 7.9 10E3/UL (ref 4–11)
WBC # BLD AUTO: 8.6 10E3/UL (ref 4–11)
WBC # BLD AUTO: 9 10E3/UL (ref 4–11)
WBC # BLD AUTO: 9.4 10E3/UL (ref 4–11)
WBC # BLD AUTO: 9.9 10E3/UL (ref 4–11)
WBC #/AREA URNS AUTO: ABNORMAL /HPF
WBC CLUMPS #/AREA URNS HPF: PRESENT /HPF
WBC URINE: 154 /HPF
WBC URINE: >182 /HPF
Y ENTEROCOL DNA STL QL NAA+PROBE: NEGATIVE

## 2023-01-01 PROCEDURE — 85018 HEMOGLOBIN: CPT | Performed by: INTERNAL MEDICINE

## 2023-01-01 PROCEDURE — 250N000013 HC RX MED GY IP 250 OP 250 PS 637: Performed by: NURSE PRACTITIONER

## 2023-01-01 PROCEDURE — 84484 ASSAY OF TROPONIN QUANT: CPT | Performed by: PHYSICIAN ASSISTANT

## 2023-01-01 PROCEDURE — 250N000009 HC RX 250: Performed by: NURSE ANESTHETIST, CERTIFIED REGISTERED

## 2023-01-01 PROCEDURE — 258N000003 HC RX IP 258 OP 636: Performed by: INTERNAL MEDICINE

## 2023-01-01 PROCEDURE — 85049 AUTOMATED PLATELET COUNT: CPT | Performed by: COLON & RECTAL SURGERY

## 2023-01-01 PROCEDURE — 83735 ASSAY OF MAGNESIUM: CPT | Performed by: INTERNAL MEDICINE

## 2023-01-01 PROCEDURE — 74018 RADEX ABDOMEN 1 VIEW: CPT

## 2023-01-01 PROCEDURE — 84165 PROTEIN E-PHORESIS SERUM: CPT | Performed by: STUDENT IN AN ORGANIZED HEALTH CARE EDUCATION/TRAINING PROGRAM

## 2023-01-01 PROCEDURE — 250N000013 HC RX MED GY IP 250 OP 250 PS 637: Performed by: STUDENT IN AN ORGANIZED HEALTH CARE EDUCATION/TRAINING PROGRAM

## 2023-01-01 PROCEDURE — 99232 SBSQ HOSP IP/OBS MODERATE 35: CPT | Performed by: INTERNAL MEDICINE

## 2023-01-01 PROCEDURE — 99281 EMR DPT VST MAYX REQ PHY/QHP: CPT

## 2023-01-01 PROCEDURE — 80048 BASIC METABOLIC PNL TOTAL CA: CPT | Performed by: COLON & RECTAL SURGERY

## 2023-01-01 PROCEDURE — 88309 TISSUE EXAM BY PATHOLOGIST: CPT | Mod: 26 | Performed by: PATHOLOGY

## 2023-01-01 PROCEDURE — C9113 INJ PANTOPRAZOLE SODIUM, VIA: HCPCS | Mod: JZ | Performed by: INTERNAL MEDICINE

## 2023-01-01 PROCEDURE — 86901 BLOOD TYPING SEROLOGIC RH(D): CPT | Performed by: COLON & RECTAL SURGERY

## 2023-01-01 PROCEDURE — 99285 EMERGENCY DEPT VISIT HI MDM: CPT | Mod: 25

## 2023-01-01 PROCEDURE — 80053 COMPREHEN METABOLIC PANEL: CPT

## 2023-01-01 PROCEDURE — 250N000013 HC RX MED GY IP 250 OP 250 PS 637: Performed by: COLON & RECTAL SURGERY

## 2023-01-01 PROCEDURE — 86431 RHEUMATOID FACTOR QUANT: CPT | Performed by: PHYSICIAN ASSISTANT

## 2023-01-01 PROCEDURE — 36415 COLL VENOUS BLD VENIPUNCTURE: CPT | Performed by: PHYSICIAN ASSISTANT

## 2023-01-01 PROCEDURE — 250N000012 HC RX MED GY IP 250 OP 636 PS 637: Performed by: ANESTHESIOLOGY

## 2023-01-01 PROCEDURE — 88305 TISSUE EXAM BY PATHOLOGIST: CPT | Mod: TC | Performed by: INTERNAL MEDICINE

## 2023-01-01 PROCEDURE — 250N000012 HC RX MED GY IP 250 OP 636 PS 637: Performed by: COLON & RECTAL SURGERY

## 2023-01-01 PROCEDURE — 120N000001 HC R&B MED SURG/OB

## 2023-01-01 PROCEDURE — G0378 HOSPITAL OBSERVATION PER HR: HCPCS

## 2023-01-01 PROCEDURE — 93306 TTE W/DOPPLER COMPLETE: CPT

## 2023-01-01 PROCEDURE — B4185 PARENTERAL SOL 10 GM LIPIDS: HCPCS | Mod: JZ | Performed by: INTERNAL MEDICINE

## 2023-01-01 PROCEDURE — 250N000013 HC RX MED GY IP 250 OP 250 PS 637: Performed by: INTERNAL MEDICINE

## 2023-01-01 PROCEDURE — 250N000011 HC RX IP 250 OP 636: Performed by: INTERNAL MEDICINE

## 2023-01-01 PROCEDURE — 250N000011 HC RX IP 250 OP 636: Mod: JZ | Performed by: INTERNAL MEDICINE

## 2023-01-01 PROCEDURE — 86038 ANTINUCLEAR ANTIBODIES: CPT

## 2023-01-01 PROCEDURE — 84484 ASSAY OF TROPONIN QUANT: CPT | Performed by: EMERGENCY MEDICINE

## 2023-01-01 PROCEDURE — 36415 COLL VENOUS BLD VENIPUNCTURE: CPT

## 2023-01-01 PROCEDURE — 99214 OFFICE O/P EST MOD 30 MIN: CPT | Performed by: INTERNAL MEDICINE

## 2023-01-01 PROCEDURE — 85027 COMPLETE CBC AUTOMATED: CPT | Performed by: INTERNAL MEDICINE

## 2023-01-01 PROCEDURE — 83605 ASSAY OF LACTIC ACID: CPT | Performed by: COLON & RECTAL SURGERY

## 2023-01-01 PROCEDURE — 99213 OFFICE O/P EST LOW 20 MIN: CPT | Performed by: PHYSICIAN ASSISTANT

## 2023-01-01 PROCEDURE — 999N000141 HC STATISTIC PRE-PROCEDURE NURSING ASSESSMENT: Performed by: INTERNAL MEDICINE

## 2023-01-01 PROCEDURE — 99214 OFFICE O/P EST MOD 30 MIN: CPT | Performed by: STUDENT IN AN ORGANIZED HEALTH CARE EDUCATION/TRAINING PROGRAM

## 2023-01-01 PROCEDURE — 93005 ELECTROCARDIOGRAM TRACING: CPT

## 2023-01-01 PROCEDURE — P9016 RBC LEUKOCYTES REDUCED: HCPCS | Performed by: COLON & RECTAL SURGERY

## 2023-01-01 PROCEDURE — 72125 CT NECK SPINE W/O DYE: CPT

## 2023-01-01 PROCEDURE — 250N000009 HC RX 250: Performed by: COLON & RECTAL SURGERY

## 2023-01-01 PROCEDURE — 85027 COMPLETE CBC AUTOMATED: CPT | Performed by: HOSPITALIST

## 2023-01-01 PROCEDURE — G0463 HOSPITAL OUTPT CLINIC VISIT: HCPCS | Performed by: INTERNAL MEDICINE

## 2023-01-01 PROCEDURE — 84100 ASSAY OF PHOSPHORUS: CPT | Performed by: STUDENT IN AN ORGANIZED HEALTH CARE EDUCATION/TRAINING PROGRAM

## 2023-01-01 PROCEDURE — 250N000011 HC RX IP 250 OP 636: Mod: JZ | Performed by: SURGERY

## 2023-01-01 PROCEDURE — 82077 ASSAY SPEC XCP UR&BREATH IA: CPT | Performed by: EMERGENCY MEDICINE

## 2023-01-01 PROCEDURE — 80053 COMPREHEN METABOLIC PANEL: CPT | Performed by: COLON & RECTAL SURGERY

## 2023-01-01 PROCEDURE — 360N000075 HC SURGERY LEVEL 2, PER MIN: Performed by: INTERNAL MEDICINE

## 2023-01-01 PROCEDURE — 83735 ASSAY OF MAGNESIUM: CPT | Performed by: EMERGENCY MEDICINE

## 2023-01-01 PROCEDURE — 96361 HYDRATE IV INFUSION ADD-ON: CPT

## 2023-01-01 PROCEDURE — 258N000003 HC RX IP 258 OP 636: Performed by: COLON & RECTAL SURGERY

## 2023-01-01 PROCEDURE — 85027 COMPLETE CBC AUTOMATED: CPT

## 2023-01-01 PROCEDURE — 83615 LACTATE (LD) (LDH) ENZYME: CPT | Performed by: INTERNAL MEDICINE

## 2023-01-01 PROCEDURE — 80048 BASIC METABOLIC PNL TOTAL CA: CPT | Performed by: EMERGENCY MEDICINE

## 2023-01-01 PROCEDURE — 80053 COMPREHEN METABOLIC PANEL: CPT | Performed by: INTERNAL MEDICINE

## 2023-01-01 PROCEDURE — 80048 BASIC METABOLIC PNL TOTAL CA: CPT | Performed by: INTERNAL MEDICINE

## 2023-01-01 PROCEDURE — 272N000001 HC OR GENERAL SUPPLY STERILE: Performed by: INTERNAL MEDICINE

## 2023-01-01 PROCEDURE — 96366 THER/PROPH/DIAG IV INF ADDON: CPT

## 2023-01-01 PROCEDURE — 84134 ASSAY OF PREALBUMIN: CPT | Performed by: INTERNAL MEDICINE

## 2023-01-01 PROCEDURE — 84155 ASSAY OF PROTEIN SERUM: CPT

## 2023-01-01 PROCEDURE — 82248 BILIRUBIN DIRECT: CPT | Performed by: EMERGENCY MEDICINE

## 2023-01-01 PROCEDURE — 82728 ASSAY OF FERRITIN: CPT | Performed by: EMERGENCY MEDICINE

## 2023-01-01 PROCEDURE — 86140 C-REACTIVE PROTEIN: CPT

## 2023-01-01 PROCEDURE — 84443 ASSAY THYROID STIM HORMONE: CPT | Performed by: EMERGENCY MEDICINE

## 2023-01-01 PROCEDURE — 86618 LYME DISEASE ANTIBODY: CPT | Performed by: PHYSICIAN ASSISTANT

## 2023-01-01 PROCEDURE — 85025 COMPLETE CBC W/AUTO DIFF WBC: CPT | Performed by: INTERNAL MEDICINE

## 2023-01-01 PROCEDURE — 85007 BL SMEAR W/DIFF WBC COUNT: CPT

## 2023-01-01 PROCEDURE — 87088 URINE BACTERIA CULTURE: CPT

## 2023-01-01 PROCEDURE — 250N000011 HC RX IP 250 OP 636: Performed by: COLON & RECTAL SURGERY

## 2023-01-01 PROCEDURE — 82570 ASSAY OF URINE CREATININE: CPT | Performed by: STUDENT IN AN ORGANIZED HEALTH CARE EDUCATION/TRAINING PROGRAM

## 2023-01-01 PROCEDURE — 36415 COLL VENOUS BLD VENIPUNCTURE: CPT | Performed by: EMERGENCY MEDICINE

## 2023-01-01 PROCEDURE — 85027 COMPLETE CBC AUTOMATED: CPT | Performed by: COLON & RECTAL SURGERY

## 2023-01-01 PROCEDURE — 96376 TX/PRO/DX INJ SAME DRUG ADON: CPT | Mod: XU

## 2023-01-01 PROCEDURE — 99232 SBSQ HOSP IP/OBS MODERATE 35: CPT | Performed by: HOSPITALIST

## 2023-01-01 PROCEDURE — 99233 SBSQ HOSP IP/OBS HIGH 50: CPT | Performed by: INTERNAL MEDICINE

## 2023-01-01 PROCEDURE — 71275 CT ANGIOGRAPHY CHEST: CPT

## 2023-01-01 PROCEDURE — 85007 BL SMEAR W/DIFF WBC COUNT: CPT | Performed by: INTERNAL MEDICINE

## 2023-01-01 PROCEDURE — 250N000011 HC RX IP 250 OP 636: Mod: JZ | Performed by: COLON & RECTAL SURGERY

## 2023-01-01 PROCEDURE — 86850 RBC ANTIBODY SCREEN: CPT | Performed by: COLON & RECTAL SURGERY

## 2023-01-01 PROCEDURE — 250N000012 HC RX MED GY IP 250 OP 636 PS 637: Performed by: INTERNAL MEDICINE

## 2023-01-01 PROCEDURE — 85014 HEMATOCRIT: CPT | Performed by: INTERNAL MEDICINE

## 2023-01-01 PROCEDURE — 99231 SBSQ HOSP IP/OBS SF/LOW 25: CPT | Performed by: INTERNAL MEDICINE

## 2023-01-01 PROCEDURE — 36415 COLL VENOUS BLD VENIPUNCTURE: CPT | Performed by: INTERNAL MEDICINE

## 2023-01-01 PROCEDURE — 87088 URINE BACTERIA CULTURE: CPT | Performed by: PHYSICIAN ASSISTANT

## 2023-01-01 PROCEDURE — 99222 1ST HOSP IP/OBS MODERATE 55: CPT | Performed by: INTERNAL MEDICINE

## 2023-01-01 PROCEDURE — P9045 ALBUMIN (HUMAN), 5%, 250 ML: HCPCS | Mod: JZ | Performed by: NURSE ANESTHETIST, CERTIFIED REGISTERED

## 2023-01-01 PROCEDURE — 96360 HYDRATION IV INFUSION INIT: CPT | Mod: 59

## 2023-01-01 PROCEDURE — 85610 PROTHROMBIN TIME: CPT | Performed by: EMERGENCY MEDICINE

## 2023-01-01 PROCEDURE — 83520 IMMUNOASSAY QUANT NOS NONAB: CPT

## 2023-01-01 PROCEDURE — 82043 UR ALBUMIN QUANTITATIVE: CPT | Performed by: STUDENT IN AN ORGANIZED HEALTH CARE EDUCATION/TRAINING PROGRAM

## 2023-01-01 PROCEDURE — 86900 BLOOD TYPING SEROLOGIC ABO: CPT

## 2023-01-01 PROCEDURE — 36415 COLL VENOUS BLD VENIPUNCTURE: CPT | Performed by: COLON & RECTAL SURGERY

## 2023-01-01 PROCEDURE — 88341 IMHCHEM/IMCYTCHM EA ADD ANTB: CPT | Mod: 26 | Performed by: PATHOLOGY

## 2023-01-01 PROCEDURE — 86038 ANTINUCLEAR ANTIBODIES: CPT | Performed by: PHYSICIAN ASSISTANT

## 2023-01-01 PROCEDURE — 90662 IIV NO PRSV INCREASED AG IM: CPT | Performed by: STUDENT IN AN ORGANIZED HEALTH CARE EDUCATION/TRAINING PROGRAM

## 2023-01-01 PROCEDURE — 84550 ASSAY OF BLOOD/URIC ACID: CPT | Performed by: PHYSICIAN ASSISTANT

## 2023-01-01 PROCEDURE — 370N000017 HC ANESTHESIA TECHNICAL FEE, PER MIN: Performed by: INTERNAL MEDICINE

## 2023-01-01 PROCEDURE — 83880 ASSAY OF NATRIURETIC PEPTIDE: CPT | Performed by: EMERGENCY MEDICINE

## 2023-01-01 PROCEDURE — 93244 EXT ECG>48HR<7D REV&INTERPJ: CPT | Performed by: INTERNAL MEDICINE

## 2023-01-01 PROCEDURE — 81001 URINALYSIS AUTO W/SCOPE: CPT

## 2023-01-01 PROCEDURE — 97116 GAIT TRAINING THERAPY: CPT | Mod: GP

## 2023-01-01 PROCEDURE — 81001 URINALYSIS AUTO W/SCOPE: CPT | Performed by: PHYSICIAN ASSISTANT

## 2023-01-01 PROCEDURE — 99215 OFFICE O/P EST HI 40 MIN: CPT | Performed by: INTERNAL MEDICINE

## 2023-01-01 PROCEDURE — 85018 HEMOGLOBIN: CPT

## 2023-01-01 PROCEDURE — 88342 IMHCHEM/IMCYTCHM 1ST ANTB: CPT | Mod: 26 | Performed by: PATHOLOGY

## 2023-01-01 PROCEDURE — 83735 ASSAY OF MAGNESIUM: CPT | Performed by: COLON & RECTAL SURGERY

## 2023-01-01 PROCEDURE — 85007 BL SMEAR W/DIFF WBC COUNT: CPT | Performed by: EMERGENCY MEDICINE

## 2023-01-01 PROCEDURE — 250N000013 HC RX MED GY IP 250 OP 250 PS 637: Performed by: HOSPITALIST

## 2023-01-01 PROCEDURE — G0439 PPPS, SUBSEQ VISIT: HCPCS | Performed by: STUDENT IN AN ORGANIZED HEALTH CARE EDUCATION/TRAINING PROGRAM

## 2023-01-01 PROCEDURE — 250N000009 HC RX 250: Mod: JZ | Performed by: INTERNAL MEDICINE

## 2023-01-01 PROCEDURE — 96365 THER/PROPH/DIAG IV INF INIT: CPT

## 2023-01-01 PROCEDURE — 87507 IADNA-DNA/RNA PROBE TQ 12-25: CPT | Performed by: COLON & RECTAL SURGERY

## 2023-01-01 PROCEDURE — 85027 COMPLETE CBC AUTOMATED: CPT | Performed by: STUDENT IN AN ORGANIZED HEALTH CARE EDUCATION/TRAINING PROGRAM

## 2023-01-01 PROCEDURE — 370N000017 HC ANESTHESIA TECHNICAL FEE, PER MIN: Performed by: COLON & RECTAL SURGERY

## 2023-01-01 PROCEDURE — 36415 COLL VENOUS BLD VENIPUNCTURE: CPT | Performed by: SURGERY

## 2023-01-01 PROCEDURE — 85018 HEMOGLOBIN: CPT | Performed by: SURGERY

## 2023-01-01 PROCEDURE — 36415 COLL VENOUS BLD VENIPUNCTURE: CPT | Performed by: ANESTHESIOLOGY

## 2023-01-01 PROCEDURE — 80053 COMPREHEN METABOLIC PANEL: CPT | Performed by: EMERGENCY MEDICINE

## 2023-01-01 PROCEDURE — 83540 ASSAY OF IRON: CPT

## 2023-01-01 PROCEDURE — 99223 1ST HOSP IP/OBS HIGH 75: CPT | Performed by: INTERNAL MEDICINE

## 2023-01-01 PROCEDURE — 99207 PR NO BILLABLE SERVICE THIS VISIT: CPT | Performed by: INTERNAL MEDICINE

## 2023-01-01 PROCEDURE — 96374 THER/PROPH/DIAG INJ IV PUSH: CPT | Mod: XU

## 2023-01-01 PROCEDURE — 83615 LACTATE (LD) (LDH) ENZYME: CPT

## 2023-01-01 PROCEDURE — 710N000009 HC RECOVERY PHASE 1, LEVEL 1, PER MIN: Performed by: COLON & RECTAL SURGERY

## 2023-01-01 PROCEDURE — 82962 GLUCOSE BLOOD TEST: CPT

## 2023-01-01 PROCEDURE — P9016 RBC LEUKOCYTES REDUCED: HCPCS | Performed by: INTERNAL MEDICINE

## 2023-01-01 PROCEDURE — 3E0436Z INTRODUCTION OF NUTRITIONAL SUBSTANCE INTO CENTRAL VEIN, PERCUTANEOUS APPROACH: ICD-10-PCS | Performed by: INTERNAL MEDICINE

## 2023-01-01 PROCEDURE — 85041 AUTOMATED RBC COUNT: CPT | Performed by: STUDENT IN AN ORGANIZED HEALTH CARE EDUCATION/TRAINING PROGRAM

## 2023-01-01 PROCEDURE — 250N000011 HC RX IP 250 OP 636: Performed by: NURSE ANESTHETIST, CERTIFIED REGISTERED

## 2023-01-01 PROCEDURE — 85045 AUTOMATED RETICULOCYTE COUNT: CPT | Performed by: EMERGENCY MEDICINE

## 2023-01-01 PROCEDURE — 85027 COMPLETE CBC AUTOMATED: CPT | Performed by: EMERGENCY MEDICINE

## 2023-01-01 PROCEDURE — 250N000009 HC RX 250

## 2023-01-01 PROCEDURE — 82728 ASSAY OF FERRITIN: CPT | Performed by: STUDENT IN AN ORGANIZED HEALTH CARE EDUCATION/TRAINING PROGRAM

## 2023-01-01 PROCEDURE — 86140 C-REACTIVE PROTEIN: CPT | Performed by: EMERGENCY MEDICINE

## 2023-01-01 PROCEDURE — 84100 ASSAY OF PHOSPHORUS: CPT | Performed by: COLON & RECTAL SURGERY

## 2023-01-01 PROCEDURE — 83550 IRON BINDING TEST: CPT

## 2023-01-01 PROCEDURE — 0DBU0ZZ EXCISION OF OMENTUM, OPEN APPROACH: ICD-10-PCS | Performed by: COLON & RECTAL SURGERY

## 2023-01-01 PROCEDURE — 83036 HEMOGLOBIN GLYCOSYLATED A1C: CPT | Performed by: STUDENT IN AN ORGANIZED HEALTH CARE EDUCATION/TRAINING PROGRAM

## 2023-01-01 PROCEDURE — 80053 COMPREHEN METABOLIC PANEL: CPT | Performed by: PHYSICIAN ASSISTANT

## 2023-01-01 PROCEDURE — 85610 PROTHROMBIN TIME: CPT | Performed by: INTERNAL MEDICINE

## 2023-01-01 PROCEDURE — 258N000003 HC RX IP 258 OP 636: Performed by: ANESTHESIOLOGY

## 2023-01-01 PROCEDURE — 70486 CT MAXILLOFACIAL W/O DYE: CPT

## 2023-01-01 PROCEDURE — 250N000011 HC RX IP 250 OP 636: Mod: JZ

## 2023-01-01 PROCEDURE — 999N000065 XR ABDOMEN 1 VIEW

## 2023-01-01 PROCEDURE — 250N000009 HC RX 250: Performed by: INTERNAL MEDICINE

## 2023-01-01 PROCEDURE — 83605 ASSAY OF LACTIC ACID: CPT | Performed by: INTERNAL MEDICINE

## 2023-01-01 PROCEDURE — 87493 C DIFF AMPLIFIED PROBE: CPT | Performed by: COLON & RECTAL SURGERY

## 2023-01-01 PROCEDURE — 82330 ASSAY OF CALCIUM: CPT | Performed by: INTERNAL MEDICINE

## 2023-01-01 PROCEDURE — 85652 RBC SED RATE AUTOMATED: CPT | Performed by: EMERGENCY MEDICINE

## 2023-01-01 PROCEDURE — 85041 AUTOMATED RBC COUNT: CPT

## 2023-01-01 PROCEDURE — 99214 OFFICE O/P EST MOD 30 MIN: CPT | Performed by: PHYSICIAN ASSISTANT

## 2023-01-01 PROCEDURE — 96376 TX/PRO/DX INJ SAME DRUG ADON: CPT

## 2023-01-01 PROCEDURE — 84100 ASSAY OF PHOSPHORUS: CPT | Performed by: INTERNAL MEDICINE

## 2023-01-01 PROCEDURE — 82310 ASSAY OF CALCIUM: CPT | Performed by: ANESTHESIOLOGY

## 2023-01-01 PROCEDURE — 36415 COLL VENOUS BLD VENIPUNCTURE: CPT | Performed by: STUDENT IN AN ORGANIZED HEALTH CARE EDUCATION/TRAINING PROGRAM

## 2023-01-01 PROCEDURE — 36430 TRANSFUSION BLD/BLD COMPNT: CPT

## 2023-01-01 PROCEDURE — 250N000025 HC SEVOFLURANE, PER MIN: Performed by: COLON & RECTAL SURGERY

## 2023-01-01 PROCEDURE — 86923 COMPATIBILITY TEST ELECTRIC: CPT | Performed by: COLON & RECTAL SURGERY

## 2023-01-01 PROCEDURE — 85007 BL SMEAR W/DIFF WBC COUNT: CPT | Performed by: SURGERY

## 2023-01-01 PROCEDURE — 99207 PR NO BILLABLE SERVICE THIS VISIT: CPT | Performed by: STUDENT IN AN ORGANIZED HEALTH CARE EDUCATION/TRAINING PROGRAM

## 2023-01-01 PROCEDURE — G0008 ADMIN INFLUENZA VIRUS VAC: HCPCS | Performed by: STUDENT IN AN ORGANIZED HEALTH CARE EDUCATION/TRAINING PROGRAM

## 2023-01-01 PROCEDURE — 85652 RBC SED RATE AUTOMATED: CPT

## 2023-01-01 PROCEDURE — 250N000009 HC RX 250: Performed by: EMERGENCY MEDICINE

## 2023-01-01 PROCEDURE — 73502 X-RAY EXAM HIP UNI 2-3 VIEWS: CPT

## 2023-01-01 PROCEDURE — 84132 ASSAY OF SERUM POTASSIUM: CPT | Performed by: INTERNAL MEDICINE

## 2023-01-01 PROCEDURE — 83550 IRON BINDING TEST: CPT | Performed by: EMERGENCY MEDICINE

## 2023-01-01 PROCEDURE — 82550 ASSAY OF CK (CPK): CPT | Performed by: PHYSICIAN ASSISTANT

## 2023-01-01 PROCEDURE — 80061 LIPID PANEL: CPT | Performed by: PHYSICIAN ASSISTANT

## 2023-01-01 PROCEDURE — 250N000011 HC RX IP 250 OP 636: Mod: JZ | Performed by: NURSE ANESTHETIST, CERTIFIED REGISTERED

## 2023-01-01 PROCEDURE — 82378 CARCINOEMBRYONIC ANTIGEN: CPT

## 2023-01-01 PROCEDURE — 0DTF0ZZ RESECTION OF RIGHT LARGE INTESTINE, OPEN APPROACH: ICD-10-PCS | Performed by: COLON & RECTAL SURGERY

## 2023-01-01 PROCEDURE — 87635 SARS-COV-2 COVID-19 AMP PRB: CPT | Performed by: EMERGENCY MEDICINE

## 2023-01-01 PROCEDURE — 710N000012 HC RECOVERY PHASE 2, PER MINUTE: Performed by: INTERNAL MEDICINE

## 2023-01-01 PROCEDURE — 80048 BASIC METABOLIC PNL TOTAL CA: CPT | Performed by: STUDENT IN AN ORGANIZED HEALTH CARE EDUCATION/TRAINING PROGRAM

## 2023-01-01 PROCEDURE — 74177 CT ABD & PELVIS W/CONTRAST: CPT

## 2023-01-01 PROCEDURE — 85018 HEMOGLOBIN: CPT | Performed by: ANESTHESIOLOGY

## 2023-01-01 PROCEDURE — 83880 ASSAY OF NATRIURETIC PEPTIDE: CPT | Performed by: PHYSICIAN ASSISTANT

## 2023-01-01 PROCEDURE — 99214 OFFICE O/P EST MOD 30 MIN: CPT | Mod: 25 | Performed by: STUDENT IN AN ORGANIZED HEALTH CARE EDUCATION/TRAINING PROGRAM

## 2023-01-01 PROCEDURE — 999N000157 HC STATISTIC RCP TIME EA 10 MIN

## 2023-01-01 PROCEDURE — 88309 TISSUE EXAM BY PATHOLOGIST: CPT | Mod: TC | Performed by: COLON & RECTAL SURGERY

## 2023-01-01 PROCEDURE — 70450 CT HEAD/BRAIN W/O DYE: CPT

## 2023-01-01 PROCEDURE — 83010 ASSAY OF HAPTOGLOBIN QUANT: CPT | Performed by: EMERGENCY MEDICINE

## 2023-01-01 PROCEDURE — 93306 TTE W/DOPPLER COMPLETE: CPT | Mod: 26 | Performed by: INTERNAL MEDICINE

## 2023-01-01 PROCEDURE — 97161 PT EVAL LOW COMPLEX 20 MIN: CPT | Mod: GP

## 2023-01-01 PROCEDURE — 12011 RPR F/E/E/N/L/M 2.5 CM/<: CPT

## 2023-01-01 PROCEDURE — 93242 EXT ECG>48HR<7D RECORDING: CPT

## 2023-01-01 PROCEDURE — 86850 RBC ANTIBODY SCREEN: CPT

## 2023-01-01 PROCEDURE — 36569 INSJ PICC 5 YR+ W/O IMAGING: CPT

## 2023-01-01 PROCEDURE — 71046 X-RAY EXAM CHEST 2 VIEWS: CPT | Mod: TC | Performed by: RADIOLOGY

## 2023-01-01 PROCEDURE — 99495 TRANSJ CARE MGMT MOD F2F 14D: CPT | Mod: GC

## 2023-01-01 PROCEDURE — P9016 RBC LEUKOCYTES REDUCED: HCPCS | Performed by: EMERGENCY MEDICINE

## 2023-01-01 PROCEDURE — 0WJG4ZZ INSPECTION OF PERITONEAL CAVITY, PERCUTANEOUS ENDOSCOPIC APPROACH: ICD-10-PCS | Performed by: COLON & RECTAL SURGERY

## 2023-01-01 PROCEDURE — 999N000141 HC STATISTIC PRE-PROCEDURE NURSING ASSESSMENT: Performed by: COLON & RECTAL SURGERY

## 2023-01-01 PROCEDURE — 258N000003 HC RX IP 258 OP 636: Performed by: EMERGENCY MEDICINE

## 2023-01-01 PROCEDURE — 82374 ASSAY BLOOD CARBON DIOXIDE: CPT | Performed by: INTERNAL MEDICINE

## 2023-01-01 PROCEDURE — 250N000011 HC RX IP 250 OP 636: Performed by: EMERGENCY MEDICINE

## 2023-01-01 PROCEDURE — 82310 ASSAY OF CALCIUM: CPT | Performed by: INTERNAL MEDICINE

## 2023-01-01 PROCEDURE — C9113 INJ PANTOPRAZOLE SODIUM, VIA: HCPCS | Mod: JZ | Performed by: EMERGENCY MEDICINE

## 2023-01-01 PROCEDURE — 250N000009 HC RX 250: Performed by: NURSE PRACTITIONER

## 2023-01-01 PROCEDURE — 85025 COMPLETE CBC W/AUTO DIFF WBC: CPT | Performed by: PHYSICIAN ASSISTANT

## 2023-01-01 PROCEDURE — 85014 HEMATOCRIT: CPT | Performed by: SURGERY

## 2023-01-01 PROCEDURE — 272N000024 HC KIT POWER PICC DOUBLE LUMEN

## 2023-01-01 PROCEDURE — 86200 CCP ANTIBODY: CPT

## 2023-01-01 PROCEDURE — 86901 BLOOD TYPING SEROLOGIC RH(D): CPT

## 2023-01-01 PROCEDURE — 999N000104 HC STATISTIC NO CHARGE

## 2023-01-01 PROCEDURE — 85652 RBC SED RATE AUTOMATED: CPT | Performed by: PHYSICIAN ASSISTANT

## 2023-01-01 PROCEDURE — 86850 RBC ANTIBODY SCREEN: CPT | Performed by: EMERGENCY MEDICINE

## 2023-01-01 PROCEDURE — 99215 OFFICE O/P EST HI 40 MIN: CPT | Performed by: NURSE PRACTITIONER

## 2023-01-01 PROCEDURE — 94640 AIRWAY INHALATION TREATMENT: CPT | Mod: 76

## 2023-01-01 PROCEDURE — 93000 ELECTROCARDIOGRAM COMPLETE: CPT | Performed by: PHYSICIAN ASSISTANT

## 2023-01-01 PROCEDURE — 272N000001 HC OR GENERAL SUPPLY STERILE: Performed by: COLON & RECTAL SURGERY

## 2023-01-01 PROCEDURE — 86923 COMPATIBILITY TEST ELECTRIC: CPT | Performed by: INTERNAL MEDICINE

## 2023-01-01 PROCEDURE — 250N000011 HC RX IP 250 OP 636: Mod: JZ | Performed by: EMERGENCY MEDICINE

## 2023-01-01 PROCEDURE — 360N000077 HC SURGERY LEVEL 4, PER MIN: Performed by: COLON & RECTAL SURGERY

## 2023-01-01 PROCEDURE — 83615 LACTATE (LD) (LDH) ENZYME: CPT | Performed by: EMERGENCY MEDICINE

## 2023-01-01 PROCEDURE — 93005 ELECTROCARDIOGRAM TRACING: CPT | Mod: XU

## 2023-01-01 PROCEDURE — 82272 OCCULT BLD FECES 1-3 TESTS: CPT | Performed by: EMERGENCY MEDICINE

## 2023-01-01 PROCEDURE — 86923 COMPATIBILITY TEST ELECTRIC: CPT | Performed by: EMERGENCY MEDICINE

## 2023-01-01 PROCEDURE — 94640 AIRWAY INHALATION TREATMENT: CPT

## 2023-01-01 PROCEDURE — 258N000003 HC RX IP 258 OP 636: Performed by: NURSE ANESTHETIST, CERTIFIED REGISTERED

## 2023-01-01 PROCEDURE — 999N000040 HC STATISTIC CONSULT NO CHARGE VASC ACCESS

## 2023-01-01 PROCEDURE — 82947 ASSAY GLUCOSE BLOOD QUANT: CPT | Performed by: INTERNAL MEDICINE

## 2023-01-01 PROCEDURE — 82728 ASSAY OF FERRITIN: CPT

## 2023-01-01 PROCEDURE — 87040 BLOOD CULTURE FOR BACTERIA: CPT | Performed by: INTERNAL MEDICINE

## 2023-01-01 PROCEDURE — 85025 COMPLETE CBC W/AUTO DIFF WBC: CPT | Performed by: EMERGENCY MEDICINE

## 2023-01-01 PROCEDURE — 88305 TISSUE EXAM BY PATHOLOGIST: CPT | Mod: 26 | Performed by: PATHOLOGY

## 2023-01-01 PROCEDURE — 86039 ANTINUCLEAR ANTIBODIES (ANA): CPT

## 2023-01-01 PROCEDURE — 86901 BLOOD TYPING SEROLOGIC RH(D): CPT | Performed by: EMERGENCY MEDICINE

## 2023-01-01 PROCEDURE — 82248 BILIRUBIN DIRECT: CPT | Performed by: INTERNAL MEDICINE

## 2023-01-01 PROCEDURE — 99239 HOSP IP/OBS DSCHRG MGMT >30: CPT | Performed by: STUDENT IN AN ORGANIZED HEALTH CARE EDUCATION/TRAINING PROGRAM

## 2023-01-01 PROCEDURE — G0180 MD CERTIFICATION HHA PATIENT: HCPCS | Performed by: STUDENT IN AN ORGANIZED HEALTH CARE EDUCATION/TRAINING PROGRAM

## 2023-01-01 RX ORDER — METOPROLOL TARTRATE 1 MG/ML
2.5 INJECTION, SOLUTION INTRAVENOUS EVERY 4 HOURS PRN
Status: DISCONTINUED | OUTPATIENT
Start: 2023-01-01 | End: 2023-01-01 | Stop reason: HOSPADM

## 2023-01-01 RX ORDER — HEPARIN SODIUM 5000 [USP'U]/.5ML
5000 INJECTION, SOLUTION INTRAVENOUS; SUBCUTANEOUS
Status: COMPLETED | OUTPATIENT
Start: 2023-01-01 | End: 2023-01-01

## 2023-01-01 RX ORDER — NALOXONE HYDROCHLORIDE 0.4 MG/ML
0.2 INJECTION, SOLUTION INTRAMUSCULAR; INTRAVENOUS; SUBCUTANEOUS
Status: DISCONTINUED | OUTPATIENT
Start: 2023-01-01 | End: 2023-01-01 | Stop reason: HOSPADM

## 2023-01-01 RX ORDER — DIPHENHYDRAMINE HYDROCHLORIDE 50 MG/ML
50 INJECTION INTRAMUSCULAR; INTRAVENOUS
Status: CANCELLED
Start: 2023-01-01

## 2023-01-01 RX ORDER — CLOPIDOGREL BISULFATE 75 MG/1
75 TABLET ORAL DAILY
Qty: 90 TABLET | Refills: 4 | Status: ON HOLD | OUTPATIENT
Start: 2023-01-01 | End: 2023-01-01

## 2023-01-01 RX ORDER — NICOTINE POLACRILEX 4 MG
15-30 LOZENGE BUCCAL
Status: DISCONTINUED | OUTPATIENT
Start: 2023-01-01 | End: 2023-01-01

## 2023-01-01 RX ORDER — HYDROMORPHONE HCL IN WATER/PF 6 MG/30 ML
0.2 PATIENT CONTROLLED ANALGESIA SYRINGE INTRAVENOUS
Status: DISCONTINUED | OUTPATIENT
Start: 2023-01-01 | End: 2023-01-01 | Stop reason: HOSPADM

## 2023-01-01 RX ORDER — METOPROLOL TARTRATE 25 MG/1
25 TABLET, FILM COATED ORAL 2 TIMES DAILY
Status: DISCONTINUED | OUTPATIENT
Start: 2023-01-01 | End: 2023-01-01

## 2023-01-01 RX ORDER — NALOXONE HYDROCHLORIDE 0.4 MG/ML
0.4 INJECTION, SOLUTION INTRAMUSCULAR; INTRAVENOUS; SUBCUTANEOUS
Status: DISCONTINUED | OUTPATIENT
Start: 2023-01-01 | End: 2023-01-01 | Stop reason: HOSPADM

## 2023-01-01 RX ORDER — HEPARIN SODIUM,PORCINE 10 UNIT/ML
5-20 VIAL (ML) INTRAVENOUS DAILY PRN
Status: CANCELLED | OUTPATIENT
Start: 2023-01-01

## 2023-01-01 RX ORDER — SODIUM CHLORIDE, SODIUM LACTATE, POTASSIUM CHLORIDE, CALCIUM CHLORIDE 600; 310; 30; 20 MG/100ML; MG/100ML; MG/100ML; MG/100ML
INJECTION, SOLUTION INTRAVENOUS CONTINUOUS
Status: DISCONTINUED | OUTPATIENT
Start: 2023-01-01 | End: 2023-01-01

## 2023-01-01 RX ORDER — METOPROLOL SUCCINATE 100 MG/1
100 TABLET, EXTENDED RELEASE ORAL DAILY
Qty: 90 TABLET | Refills: 4 | Status: SHIPPED | OUTPATIENT
Start: 2023-01-01 | End: 2024-01-01

## 2023-01-01 RX ORDER — SODIUM CHLORIDE, SODIUM LACTATE, POTASSIUM CHLORIDE, CALCIUM CHLORIDE 600; 310; 30; 20 MG/100ML; MG/100ML; MG/100ML; MG/100ML
INJECTION, SOLUTION INTRAVENOUS CONTINUOUS
Status: DISCONTINUED | OUTPATIENT
Start: 2023-01-01 | End: 2023-01-01 | Stop reason: HOSPADM

## 2023-01-01 RX ORDER — MEPERIDINE HYDROCHLORIDE 25 MG/ML
25 INJECTION INTRAMUSCULAR; INTRAVENOUS; SUBCUTANEOUS EVERY 30 MIN PRN
Status: CANCELLED | OUTPATIENT
Start: 2023-01-01

## 2023-01-01 RX ORDER — EPINEPHRINE 1 MG/ML
0.3 INJECTION, SOLUTION INTRAMUSCULAR; SUBCUTANEOUS EVERY 5 MIN PRN
Status: CANCELLED | OUTPATIENT
Start: 2023-01-01

## 2023-01-01 RX ORDER — ALBUTEROL SULFATE 90 UG/1
1-2 AEROSOL, METERED RESPIRATORY (INHALATION)
Status: CANCELLED
Start: 2023-01-01

## 2023-01-01 RX ORDER — ONDANSETRON 2 MG/ML
INJECTION INTRAMUSCULAR; INTRAVENOUS PRN
Status: DISCONTINUED | OUTPATIENT
Start: 2023-01-01 | End: 2023-01-01

## 2023-01-01 RX ORDER — ACETAMINOPHEN 325 MG/1
650 TABLET ORAL EVERY 4 HOURS PRN
Status: DISCONTINUED | OUTPATIENT
Start: 2023-01-01 | End: 2023-01-01

## 2023-01-01 RX ORDER — HYDRALAZINE HYDROCHLORIDE 20 MG/ML
2.5-5 INJECTION INTRAMUSCULAR; INTRAVENOUS EVERY 10 MIN PRN
Status: DISCONTINUED | OUTPATIENT
Start: 2023-01-01 | End: 2023-01-01 | Stop reason: HOSPADM

## 2023-01-01 RX ORDER — FENTANYL CITRATE 50 UG/ML
INJECTION, SOLUTION INTRAMUSCULAR; INTRAVENOUS PRN
Status: DISCONTINUED | OUTPATIENT
Start: 2023-01-01 | End: 2023-01-01

## 2023-01-01 RX ORDER — OXYCODONE HYDROCHLORIDE 5 MG/1
5 TABLET ORAL EVERY 4 HOURS PRN
Status: DISCONTINUED | OUTPATIENT
Start: 2023-01-01 | End: 2023-01-01 | Stop reason: HOSPADM

## 2023-01-01 RX ORDER — ONDANSETRON 2 MG/ML
4 INJECTION INTRAMUSCULAR; INTRAVENOUS EVERY 30 MIN PRN
Status: DISCONTINUED | OUTPATIENT
Start: 2023-01-01 | End: 2023-01-01 | Stop reason: HOSPADM

## 2023-01-01 RX ORDER — HYDROMORPHONE HCL IN WATER/PF 6 MG/30 ML
0.2 PATIENT CONTROLLED ANALGESIA SYRINGE INTRAVENOUS
Status: DISCONTINUED | OUTPATIENT
Start: 2023-01-01 | End: 2023-01-01

## 2023-01-01 RX ORDER — NICOTINE POLACRILEX 4 MG
15-30 LOZENGE BUCCAL
Status: DISCONTINUED | OUTPATIENT
Start: 2023-01-01 | End: 2023-01-01 | Stop reason: HOSPADM

## 2023-01-01 RX ORDER — PANTOPRAZOLE SODIUM 40 MG/1
40 TABLET, DELAYED RELEASE ORAL DAILY
Status: DISCONTINUED | OUTPATIENT
Start: 2023-01-01 | End: 2023-01-01 | Stop reason: HOSPADM

## 2023-01-01 RX ORDER — ONDANSETRON 4 MG/1
4 TABLET, ORALLY DISINTEGRATING ORAL EVERY 30 MIN PRN
Status: DISCONTINUED | OUTPATIENT
Start: 2023-01-01 | End: 2023-01-01 | Stop reason: HOSPADM

## 2023-01-01 RX ORDER — METOPROLOL SUCCINATE 100 MG/1
100 TABLET, EXTENDED RELEASE ORAL DAILY
Status: DISCONTINUED | OUTPATIENT
Start: 2023-01-01 | End: 2023-01-01

## 2023-01-01 RX ORDER — ASPIRIN 81 MG/1
81 TABLET ORAL DAILY
Qty: 30 TABLET | Refills: 1 | Status: ON HOLD | OUTPATIENT
Start: 2023-01-01 | End: 2024-01-01

## 2023-01-01 RX ORDER — ALBUTEROL SULFATE 0.83 MG/ML
2.5 SOLUTION RESPIRATORY (INHALATION)
Status: DISCONTINUED | OUTPATIENT
Start: 2023-01-01 | End: 2023-01-01 | Stop reason: HOSPADM

## 2023-01-01 RX ORDER — HEPARIN SODIUM 5000 [USP'U]/.5ML
5000 INJECTION, SOLUTION INTRAVENOUS; SUBCUTANEOUS EVERY 8 HOURS
Status: DISCONTINUED | OUTPATIENT
Start: 2023-01-01 | End: 2023-01-01 | Stop reason: HOSPADM

## 2023-01-01 RX ORDER — PANTOPRAZOLE SODIUM 40 MG/1
40 TABLET, DELAYED RELEASE ORAL DAILY
Status: DISCONTINUED | OUTPATIENT
Start: 2023-01-01 | End: 2023-01-01

## 2023-01-01 RX ORDER — ROSUVASTATIN CALCIUM 20 MG/1
20 TABLET, COATED ORAL DAILY
Status: DISCONTINUED | OUTPATIENT
Start: 2023-01-01 | End: 2023-01-01 | Stop reason: HOSPADM

## 2023-01-01 RX ORDER — ROSUVASTATIN CALCIUM 20 MG/1
20 TABLET, COATED ORAL DAILY
Qty: 90 TABLET | Refills: 4 | Status: SHIPPED | OUTPATIENT
Start: 2023-01-01

## 2023-01-01 RX ORDER — POTASSIUM CHLORIDE 1500 MG/1
40 TABLET, EXTENDED RELEASE ORAL ONCE
Status: DISCONTINUED | OUTPATIENT
Start: 2023-01-01 | End: 2023-01-01

## 2023-01-01 RX ORDER — DEXTROSE MONOHYDRATE 25 G/50ML
25-50 INJECTION, SOLUTION INTRAVENOUS
Status: DISCONTINUED | OUTPATIENT
Start: 2023-01-01 | End: 2023-01-01 | Stop reason: HOSPADM

## 2023-01-01 RX ORDER — OXYCODONE HYDROCHLORIDE 5 MG/1
10 TABLET ORAL
Status: DISCONTINUED | OUTPATIENT
Start: 2023-01-01 | End: 2023-01-01 | Stop reason: HOSPADM

## 2023-01-01 RX ORDER — METOPROLOL TARTRATE 1 MG/ML
INJECTION, SOLUTION INTRAVENOUS PRN
Status: DISCONTINUED | OUTPATIENT
Start: 2023-01-01 | End: 2023-01-01

## 2023-01-01 RX ORDER — SODIUM CHLORIDE 9 MG/ML
INJECTION, SOLUTION INTRAVENOUS CONTINUOUS
Status: DISCONTINUED | OUTPATIENT
Start: 2023-01-01 | End: 2023-01-01

## 2023-01-01 RX ORDER — POTASSIUM CHLORIDE 1500 MG/1
20 TABLET, EXTENDED RELEASE ORAL ONCE
Status: COMPLETED | OUTPATIENT
Start: 2023-01-01 | End: 2023-01-01

## 2023-01-01 RX ORDER — OXYCODONE HYDROCHLORIDE 5 MG/1
10 TABLET ORAL EVERY 4 HOURS PRN
Status: DISCONTINUED | OUTPATIENT
Start: 2023-01-01 | End: 2023-01-01 | Stop reason: HOSPADM

## 2023-01-01 RX ORDER — METHYLPREDNISOLONE SODIUM SUCCINATE 125 MG/2ML
125 INJECTION, POWDER, LYOPHILIZED, FOR SOLUTION INTRAMUSCULAR; INTRAVENOUS
Status: CANCELLED
Start: 2023-01-01

## 2023-01-01 RX ORDER — LIDOCAINE 40 MG/G
CREAM TOPICAL
Status: DISCONTINUED | OUTPATIENT
Start: 2023-01-01 | End: 2023-01-01 | Stop reason: HOSPADM

## 2023-01-01 RX ORDER — ACETAMINOPHEN 325 MG/1
650 TABLET ORAL EVERY 4 HOURS PRN
Status: DISCONTINUED | OUTPATIENT
Start: 2023-01-01 | End: 2023-01-01 | Stop reason: HOSPADM

## 2023-01-01 RX ORDER — HYDROMORPHONE HCL IN WATER/PF 6 MG/30 ML
0.2 PATIENT CONTROLLED ANALGESIA SYRINGE INTRAVENOUS EVERY 5 MIN PRN
Status: DISCONTINUED | OUTPATIENT
Start: 2023-01-01 | End: 2023-01-01 | Stop reason: HOSPADM

## 2023-01-01 RX ORDER — HEPARIN SODIUM (PORCINE) LOCK FLUSH IV SOLN 100 UNIT/ML 100 UNIT/ML
5 SOLUTION INTRAVENOUS
Status: CANCELLED | OUTPATIENT
Start: 2023-01-01

## 2023-01-01 RX ORDER — IOPAMIDOL 755 MG/ML
120 INJECTION, SOLUTION INTRAVASCULAR ONCE
Status: COMPLETED | OUTPATIENT
Start: 2023-01-01 | End: 2023-01-01

## 2023-01-01 RX ORDER — ONDANSETRON 2 MG/ML
4 INJECTION INTRAMUSCULAR; INTRAVENOUS ONCE
Status: COMPLETED | OUTPATIENT
Start: 2023-01-01 | End: 2023-01-01

## 2023-01-01 RX ORDER — MAGNESIUM HYDROXIDE 1200 MG/15ML
LIQUID ORAL PRN
Status: DISCONTINUED | OUTPATIENT
Start: 2023-01-01 | End: 2023-01-01 | Stop reason: HOSPADM

## 2023-01-01 RX ORDER — CEFAZOLIN SODIUM/WATER 2 G/20 ML
2 SYRINGE (ML) INTRAVENOUS
Status: COMPLETED | OUTPATIENT
Start: 2023-01-01 | End: 2023-01-01

## 2023-01-01 RX ORDER — PREDNISONE 10 MG/1
10 TABLET ORAL DAILY
Qty: 10 TABLET | Refills: 0 | Status: SHIPPED | OUTPATIENT
Start: 2023-01-01 | End: 2023-01-01

## 2023-01-01 RX ORDER — AMLODIPINE BESYLATE 5 MG/1
5 TABLET ORAL DAILY
Qty: 90 TABLET | Refills: 3 | COMMUNITY
Start: 2023-01-01 | End: 2023-01-01

## 2023-01-01 RX ORDER — ONDANSETRON 4 MG/1
4 TABLET, ORALLY DISINTEGRATING ORAL EVERY 6 HOURS PRN
Status: DISCONTINUED | OUTPATIENT
Start: 2023-01-01 | End: 2023-01-01 | Stop reason: HOSPADM

## 2023-01-01 RX ORDER — NITROFURANTOIN 25; 75 MG/1; MG/1
100 CAPSULE ORAL 2 TIMES DAILY
Qty: 14 CAPSULE | Refills: 0 | Status: SHIPPED | OUTPATIENT
Start: 2023-01-01 | End: 2023-01-01

## 2023-01-01 RX ORDER — PROPOFOL 10 MG/ML
INJECTION, EMULSION INTRAVENOUS PRN
Status: DISCONTINUED | OUTPATIENT
Start: 2023-01-01 | End: 2023-01-01

## 2023-01-01 RX ORDER — THIAMINE HYDROCHLORIDE 100 MG/ML
100 INJECTION, SOLUTION INTRAMUSCULAR; INTRAVENOUS DAILY
Status: DISCONTINUED | OUTPATIENT
Start: 2023-01-01 | End: 2023-01-01

## 2023-01-01 RX ORDER — BUPIVACAINE HYDROCHLORIDE AND EPINEPHRINE 2.5; 5 MG/ML; UG/ML
INJECTION, SOLUTION EPIDURAL; INFILTRATION; INTRACAUDAL; PERINEURAL PRN
Status: DISCONTINUED | OUTPATIENT
Start: 2023-01-01 | End: 2023-01-01 | Stop reason: HOSPADM

## 2023-01-01 RX ORDER — LIDOCAINE 50 MG/G
OINTMENT TOPICAL 4 TIMES DAILY
Status: DISCONTINUED | OUTPATIENT
Start: 2023-01-01 | End: 2023-01-01 | Stop reason: HOSPADM

## 2023-01-01 RX ORDER — ACETAMINOPHEN 325 MG/1
975 TABLET ORAL ONCE
Status: COMPLETED | OUTPATIENT
Start: 2023-01-01 | End: 2023-01-01

## 2023-01-01 RX ORDER — FENTANYL CITRATE 50 UG/ML
50 INJECTION, SOLUTION INTRAMUSCULAR; INTRAVENOUS EVERY 5 MIN PRN
Status: DISCONTINUED | OUTPATIENT
Start: 2023-01-01 | End: 2023-01-01 | Stop reason: HOSPADM

## 2023-01-01 RX ORDER — ONDANSETRON 2 MG/ML
4 INJECTION INTRAMUSCULAR; INTRAVENOUS
Status: DISCONTINUED | OUTPATIENT
Start: 2023-01-01 | End: 2023-01-01 | Stop reason: HOSPADM

## 2023-01-01 RX ORDER — PROCHLORPERAZINE MALEATE 5 MG
5 TABLET ORAL EVERY 6 HOURS PRN
Status: DISCONTINUED | OUTPATIENT
Start: 2023-01-01 | End: 2023-01-01 | Stop reason: HOSPADM

## 2023-01-01 RX ORDER — LIDOCAINE HYDROCHLORIDE 20 MG/ML
INJECTION, SOLUTION INFILTRATION; PERINEURAL PRN
Status: DISCONTINUED | OUTPATIENT
Start: 2023-01-01 | End: 2023-01-01

## 2023-01-01 RX ORDER — ACETAMINOPHEN 500 MG
1000 TABLET ORAL EVERY 8 HOURS
Status: DISCONTINUED | OUTPATIENT
Start: 2023-01-01 | End: 2023-01-01

## 2023-01-01 RX ORDER — CALCIUM CARBONATE 500(1250)
500 TABLET ORAL 2 TIMES DAILY WITH MEALS
Status: DISCONTINUED | OUTPATIENT
Start: 2023-01-01 | End: 2023-01-01 | Stop reason: HOSPADM

## 2023-01-01 RX ORDER — LIDOCAINE HYDROCHLORIDE AND EPINEPHRINE 10; 10 MG/ML; UG/ML
INJECTION, SOLUTION INFILTRATION; PERINEURAL
Status: DISCONTINUED
Start: 2023-01-01 | End: 2023-01-01 | Stop reason: HOSPADM

## 2023-01-01 RX ORDER — PANTOPRAZOLE SODIUM 40 MG/1
40 TABLET, DELAYED RELEASE ORAL
Status: DISCONTINUED | OUTPATIENT
Start: 2023-01-01 | End: 2023-01-01 | Stop reason: HOSPADM

## 2023-01-01 RX ORDER — HYDROXYZINE HYDROCHLORIDE 25 MG/1
25 TABLET, FILM COATED ORAL EVERY 6 HOURS PRN
Status: DISCONTINUED | OUTPATIENT
Start: 2023-01-01 | End: 2023-01-01

## 2023-01-01 RX ORDER — ACYCLOVIR 400 MG/1
1 TABLET ORAL
Qty: 3 EACH | Refills: 5 | Status: SHIPPED | OUTPATIENT
Start: 2023-01-01 | End: 2024-01-01

## 2023-01-01 RX ORDER — POTASSIUM CHLORIDE 1500 MG/1
40 TABLET, EXTENDED RELEASE ORAL ONCE
Status: COMPLETED | OUTPATIENT
Start: 2023-01-01 | End: 2023-01-01

## 2023-01-01 RX ORDER — INSULIN LISPRO 100 [IU]/ML
INJECTION, SOLUTION INTRAVENOUS; SUBCUTANEOUS
COMMUNITY
Start: 2023-01-01

## 2023-01-01 RX ORDER — PANTOPRAZOLE SODIUM 20 MG/1
40 TABLET, DELAYED RELEASE ORAL DAILY
Qty: 90 TABLET | Refills: 4 | Status: SHIPPED | OUTPATIENT
Start: 2023-01-01 | End: 2023-01-01

## 2023-01-01 RX ORDER — CEFAZOLIN SODIUM/WATER 2 G/20 ML
2 SYRINGE (ML) INTRAVENOUS SEE ADMIN INSTRUCTIONS
Status: DISCONTINUED | OUTPATIENT
Start: 2023-01-01 | End: 2023-01-01 | Stop reason: HOSPADM

## 2023-01-01 RX ORDER — NITROGLYCERIN 0.4 MG/1
0.4 TABLET SUBLINGUAL EVERY 5 MIN PRN
Status: DISCONTINUED | OUTPATIENT
Start: 2023-01-01 | End: 2023-01-01 | Stop reason: HOSPADM

## 2023-01-01 RX ORDER — HYDROMORPHONE HCL IN WATER/PF 6 MG/30 ML
0.4 PATIENT CONTROLLED ANALGESIA SYRINGE INTRAVENOUS
Status: DISCONTINUED | OUTPATIENT
Start: 2023-01-01 | End: 2023-01-01 | Stop reason: HOSPADM

## 2023-01-01 RX ORDER — DEXAMETHASONE SODIUM PHOSPHATE 4 MG/ML
INJECTION, SOLUTION INTRA-ARTICULAR; INTRALESIONAL; INTRAMUSCULAR; INTRAVENOUS; SOFT TISSUE PRN
Status: DISCONTINUED | OUTPATIENT
Start: 2023-01-01 | End: 2023-01-01

## 2023-01-01 RX ORDER — OXYCODONE HYDROCHLORIDE 5 MG/1
5 TABLET ORAL EVERY 6 HOURS PRN
Qty: 5 TABLET | Refills: 0 | Status: SHIPPED | OUTPATIENT
Start: 2023-01-01 | End: 2024-01-01

## 2023-01-01 RX ORDER — ASPIRIN 81 MG/1
81 TABLET ORAL DAILY
Status: DISCONTINUED | OUTPATIENT
Start: 2023-01-01 | End: 2023-01-01 | Stop reason: HOSPADM

## 2023-01-01 RX ORDER — HYDROMORPHONE HYDROCHLORIDE 2 MG/1
2 TABLET ORAL EVERY 4 HOURS PRN
Status: DISCONTINUED | OUTPATIENT
Start: 2023-01-01 | End: 2023-01-01 | Stop reason: HOSPADM

## 2023-01-01 RX ORDER — ALBUTEROL SULFATE 0.83 MG/ML
2.5 SOLUTION RESPIRATORY (INHALATION) EVERY 4 HOURS PRN
Status: DISCONTINUED | OUTPATIENT
Start: 2023-01-01 | End: 2023-01-01 | Stop reason: HOSPADM

## 2023-01-01 RX ORDER — PROCHLORPERAZINE 25 MG
12.5 SUPPOSITORY, RECTAL RECTAL EVERY 12 HOURS PRN
Status: DISCONTINUED | OUTPATIENT
Start: 2023-01-01 | End: 2023-01-01 | Stop reason: HOSPADM

## 2023-01-01 RX ORDER — LORAZEPAM 2 MG/ML
0.25 INJECTION INTRAMUSCULAR ONCE
Status: COMPLETED | OUTPATIENT
Start: 2023-01-01 | End: 2023-01-01

## 2023-01-01 RX ORDER — METRONIDAZOLE 500 MG/1
TABLET ORAL
Status: ON HOLD | COMMUNITY
Start: 2023-01-01 | End: 2023-01-01

## 2023-01-01 RX ORDER — METFORMIN HCL 500 MG
1000 TABLET, EXTENDED RELEASE 24 HR ORAL
Qty: 180 TABLET | Refills: 4 | Status: SHIPPED | OUTPATIENT
Start: 2023-01-01

## 2023-01-01 RX ORDER — SODIUM CHLORIDE 9 MG/ML
INJECTION, SOLUTION INTRAVENOUS CONTINUOUS
Status: DISCONTINUED | OUTPATIENT
Start: 2023-01-01 | End: 2023-01-01 | Stop reason: HOSPADM

## 2023-01-01 RX ORDER — GLYCOPYRROLATE 0.2 MG/ML
INJECTION, SOLUTION INTRAMUSCULAR; INTRAVENOUS PRN
Status: DISCONTINUED | OUTPATIENT
Start: 2023-01-01 | End: 2023-01-01

## 2023-01-01 RX ORDER — ALBUTEROL SULFATE 0.83 MG/ML
2.5 SOLUTION RESPIRATORY (INHALATION)
Status: CANCELLED | OUTPATIENT
Start: 2023-01-01

## 2023-01-01 RX ORDER — DEXTROSE MONOHYDRATE 25 G/50ML
25-50 INJECTION, SOLUTION INTRAVENOUS
Status: DISCONTINUED | OUTPATIENT
Start: 2023-01-01 | End: 2023-01-01

## 2023-01-01 RX ORDER — DEXTROSE MONOHYDRATE, SODIUM CHLORIDE, AND POTASSIUM CHLORIDE 50; 1.49; 4.5 G/1000ML; G/1000ML; G/1000ML
INJECTION, SOLUTION INTRAVENOUS CONTINUOUS
Status: DISCONTINUED | OUTPATIENT
Start: 2023-01-01 | End: 2023-01-01

## 2023-01-01 RX ORDER — PREDNISONE 10 MG/1
10 TABLET ORAL DAILY
Qty: 30 TABLET | Refills: 0 | Status: SHIPPED | OUTPATIENT
Start: 2023-01-01 | End: 2023-01-01

## 2023-01-01 RX ORDER — LANCETS
EACH MISCELLANEOUS
COMMUNITY
Start: 2023-01-01

## 2023-01-01 RX ORDER — AMOXICILLIN 250 MG
2 CAPSULE ORAL 2 TIMES DAILY PRN
Status: DISCONTINUED | OUTPATIENT
Start: 2023-01-01 | End: 2023-01-01 | Stop reason: HOSPADM

## 2023-01-01 RX ORDER — IOPAMIDOL 755 MG/ML
500 INJECTION, SOLUTION INTRAVASCULAR ONCE
Status: COMPLETED | OUTPATIENT
Start: 2023-01-01 | End: 2023-01-01

## 2023-01-01 RX ORDER — LIDOCAINE 4 G/G
2 PATCH TOPICAL
Status: DISCONTINUED | OUTPATIENT
Start: 2023-01-01 | End: 2023-01-01 | Stop reason: HOSPADM

## 2023-01-01 RX ORDER — POTASSIUM CHLORIDE 1500 MG/1
20 TABLET, EXTENDED RELEASE ORAL ONCE
Status: DISCONTINUED | OUTPATIENT
Start: 2023-01-01 | End: 2023-01-01

## 2023-01-01 RX ORDER — METOPROLOL TARTRATE 50 MG
50 TABLET ORAL 2 TIMES DAILY
Status: DISCONTINUED | OUTPATIENT
Start: 2023-01-01 | End: 2023-01-01 | Stop reason: HOSPADM

## 2023-01-01 RX ORDER — MORPHINE SULFATE 2 MG/ML
2 INJECTION, SOLUTION INTRAMUSCULAR; INTRAVENOUS
Status: DISCONTINUED | OUTPATIENT
Start: 2023-01-01 | End: 2023-01-01 | Stop reason: HOSPADM

## 2023-01-01 RX ORDER — AMOXICILLIN 250 MG
1 CAPSULE ORAL 2 TIMES DAILY PRN
Status: DISCONTINUED | OUTPATIENT
Start: 2023-01-01 | End: 2023-01-01 | Stop reason: HOSPADM

## 2023-01-01 RX ORDER — ACETAMINOPHEN 325 MG/1
975 TABLET ORAL EVERY 8 HOURS
Status: DISCONTINUED | OUTPATIENT
Start: 2023-01-01 | End: 2023-01-01 | Stop reason: HOSPADM

## 2023-01-01 RX ORDER — PREDNISONE 20 MG/1
20 TABLET ORAL DAILY
Status: DISCONTINUED | OUTPATIENT
Start: 2023-01-01 | End: 2023-01-01 | Stop reason: HOSPADM

## 2023-01-01 RX ORDER — OXYCODONE HYDROCHLORIDE 5 MG/1
5 TABLET ORAL
Status: DISCONTINUED | OUTPATIENT
Start: 2023-01-01 | End: 2023-01-01 | Stop reason: HOSPADM

## 2023-01-01 RX ORDER — HYDROXYZINE HYDROCHLORIDE 10 MG/1
10 TABLET, FILM COATED ORAL EVERY 6 HOURS
Status: DISCONTINUED | OUTPATIENT
Start: 2023-01-01 | End: 2023-01-01 | Stop reason: HOSPADM

## 2023-01-01 RX ORDER — METRONIDAZOLE 500 MG/100ML
500 INJECTION, SOLUTION INTRAVENOUS
Status: COMPLETED | OUTPATIENT
Start: 2023-01-01 | End: 2023-01-01

## 2023-01-01 RX ORDER — CALCIUM GLUCONATE 20 MG/ML
1 INJECTION, SOLUTION INTRAVENOUS ONCE
Status: COMPLETED | OUTPATIENT
Start: 2023-01-01 | End: 2023-01-01

## 2023-01-01 RX ORDER — GABAPENTIN 100 MG/1
100 CAPSULE ORAL 3 TIMES DAILY
Status: DISCONTINUED | OUTPATIENT
Start: 2023-01-01 | End: 2023-01-01 | Stop reason: HOSPADM

## 2023-01-01 RX ORDER — LABETALOL HYDROCHLORIDE 5 MG/ML
10 INJECTION, SOLUTION INTRAVENOUS
Status: DISCONTINUED | OUTPATIENT
Start: 2023-01-01 | End: 2023-01-01 | Stop reason: HOSPADM

## 2023-01-01 RX ORDER — PANTOPRAZOLE SODIUM 40 MG/1
40 TABLET, DELAYED RELEASE ORAL DAILY
Qty: 90 TABLET | Refills: 4 | Status: SHIPPED | OUTPATIENT
Start: 2023-01-01

## 2023-01-01 RX ORDER — DEXTROSE MONOHYDRATE 100 MG/ML
INJECTION, SOLUTION INTRAVENOUS CONTINUOUS PRN
Status: DISCONTINUED | OUTPATIENT
Start: 2023-01-01 | End: 2023-01-01 | Stop reason: HOSPADM

## 2023-01-01 RX ORDER — FENTANYL CITRATE 50 UG/ML
25 INJECTION, SOLUTION INTRAMUSCULAR; INTRAVENOUS EVERY 5 MIN PRN
Status: DISCONTINUED | OUTPATIENT
Start: 2023-01-01 | End: 2023-01-01 | Stop reason: HOSPADM

## 2023-01-01 RX ORDER — PREDNISONE 5 MG/1
10 TABLET ORAL DAILY
Status: DISCONTINUED | OUTPATIENT
Start: 2023-01-01 | End: 2023-01-01 | Stop reason: HOSPADM

## 2023-01-01 RX ORDER — ONDANSETRON 4 MG/1
TABLET, ORALLY DISINTEGRATING ORAL
Status: ON HOLD | COMMUNITY
Start: 2023-01-01 | End: 2023-01-01

## 2023-01-01 RX ORDER — UBIQUINOL 100 MG
CAPSULE ORAL
COMMUNITY
Start: 2023-01-01

## 2023-01-01 RX ORDER — POTASSIUM CHLORIDE 7.45 MG/ML
10 INJECTION INTRAVENOUS
Status: COMPLETED | OUTPATIENT
Start: 2023-01-01 | End: 2023-01-01

## 2023-01-01 RX ORDER — ACETAMINOPHEN 325 MG/1
975 TABLET ORAL EVERY 8 HOURS
Status: COMPLETED | OUTPATIENT
Start: 2023-01-01 | End: 2023-01-01

## 2023-01-01 RX ORDER — NITROFURANTOIN 25; 75 MG/1; MG/1
100 CAPSULE ORAL 2 TIMES DAILY
Qty: 14 CAPSULE | Refills: 0 | Status: ON HOLD | OUTPATIENT
Start: 2023-01-01 | End: 2023-01-01

## 2023-01-01 RX ORDER — ROSUVASTATIN CALCIUM 20 MG/1
20 TABLET, COATED ORAL DAILY
Status: DISCONTINUED | OUTPATIENT
Start: 2023-01-01 | End: 2023-01-01

## 2023-01-01 RX ORDER — ONDANSETRON 2 MG/ML
4 INJECTION INTRAMUSCULAR; INTRAVENOUS EVERY 6 HOURS PRN
Status: DISCONTINUED | OUTPATIENT
Start: 2023-01-01 | End: 2023-01-01 | Stop reason: HOSPADM

## 2023-01-01 RX ORDER — LOSARTAN POTASSIUM 25 MG/1
25 TABLET ORAL DAILY
Qty: 90 TABLET | Refills: 3 | Status: SHIPPED | OUTPATIENT
Start: 2023-01-01

## 2023-01-01 RX ORDER — METOPROLOL SUCCINATE 100 MG/1
100 TABLET, EXTENDED RELEASE ORAL DAILY
Status: DISCONTINUED | OUTPATIENT
Start: 2023-01-01 | End: 2023-01-01 | Stop reason: HOSPADM

## 2023-01-01 RX ORDER — ACYCLOVIR 400 MG/1
1 TABLET ORAL ONCE
Qty: 1 EACH | Refills: 0 | Status: SHIPPED | OUTPATIENT
Start: 2023-01-01 | End: 2023-01-01

## 2023-01-01 RX ORDER — METOPROLOL TARTRATE 1 MG/ML
5 INJECTION, SOLUTION INTRAVENOUS EVERY 6 HOURS
Status: DISCONTINUED | OUTPATIENT
Start: 2023-01-01 | End: 2023-01-01

## 2023-01-01 RX ORDER — LANOLIN ALCOHOL/MO/W.PET/CERES
100 CREAM (GRAM) TOPICAL DAILY
Qty: 30 TABLET | Refills: 0 | Status: SHIPPED | OUTPATIENT
Start: 2023-01-01

## 2023-01-01 RX ORDER — HYDRALAZINE HYDROCHLORIDE 20 MG/ML
10 INJECTION INTRAMUSCULAR; INTRAVENOUS EVERY 4 HOURS PRN
Status: DISCONTINUED | OUTPATIENT
Start: 2023-01-01 | End: 2023-01-01 | Stop reason: HOSPADM

## 2023-01-01 RX ORDER — HYDROMORPHONE HCL IN WATER/PF 6 MG/30 ML
0.4 PATIENT CONTROLLED ANALGESIA SYRINGE INTRAVENOUS EVERY 5 MIN PRN
Status: DISCONTINUED | OUTPATIENT
Start: 2023-01-01 | End: 2023-01-01 | Stop reason: HOSPADM

## 2023-01-01 RX ORDER — SODIUM CHLORIDE 9 MG/ML
INJECTION, SOLUTION INTRAVENOUS CONTINUOUS
Status: ACTIVE | OUTPATIENT
Start: 2023-01-01 | End: 2023-01-01

## 2023-01-01 RX ADMIN — DICLOFENAC SODIUM 4 G: 10 GEL TOPICAL at 22:40

## 2023-01-01 RX ADMIN — HYDROMORPHONE HYDROCHLORIDE 0.2 MG: 0.2 INJECTION, SOLUTION INTRAMUSCULAR; INTRAVENOUS; SUBCUTANEOUS at 01:57

## 2023-01-01 RX ADMIN — ACETAMINOPHEN 975 MG: 325 TABLET, FILM COATED ORAL at 11:19

## 2023-01-01 RX ADMIN — SODIUM CHLORIDE: 9 INJECTION, SOLUTION INTRAVENOUS at 00:24

## 2023-01-01 RX ADMIN — LIDOCAINE PATCH 4% 2 PATCH: 40 PATCH TOPICAL at 08:58

## 2023-01-01 RX ADMIN — SODIUM CHLORIDE 100 ML: 9 INJECTION, SOLUTION INTRAVENOUS at 11:41

## 2023-01-01 RX ADMIN — ALBUTEROL SULFATE 2.5 MG: 2.5 SOLUTION RESPIRATORY (INHALATION) at 11:26

## 2023-01-01 RX ADMIN — HEPARIN SODIUM 5000 UNITS: 5000 INJECTION, SOLUTION INTRAVENOUS; SUBCUTANEOUS at 16:02

## 2023-01-01 RX ADMIN — SODIUM CHLORIDE 100 ML: 9 INJECTION, SOLUTION INTRAVENOUS at 12:19

## 2023-01-01 RX ADMIN — ONDANSETRON 4 MG: 2 INJECTION INTRAMUSCULAR; INTRAVENOUS at 16:38

## 2023-01-01 RX ADMIN — ACETAMINOPHEN 975 MG: 325 TABLET, FILM COATED ORAL at 19:24

## 2023-01-01 RX ADMIN — PANTOPRAZOLE SODIUM 40 MG: 40 INJECTION, POWDER, FOR SOLUTION INTRAVENOUS at 08:33

## 2023-01-01 RX ADMIN — PHENYLEPHRINE HYDROCHLORIDE 100 MCG: 10 INJECTION INTRAVENOUS at 13:16

## 2023-01-01 RX ADMIN — ACETAMINOPHEN 975 MG: 325 TABLET, FILM COATED ORAL at 03:04

## 2023-01-01 RX ADMIN — TOPICAL ANESTHETIC 0.5 ML: 200 SPRAY DENTAL; PERIODONTAL at 16:03

## 2023-01-01 RX ADMIN — SUGAMMADEX 300 MG: 100 INJECTION, SOLUTION INTRAVENOUS at 17:07

## 2023-01-01 RX ADMIN — PANTOPRAZOLE SODIUM 40 MG: 40 TABLET, DELAYED RELEASE ORAL at 06:36

## 2023-01-01 RX ADMIN — PREDNISONE 20 MG: 20 TABLET ORAL at 08:16

## 2023-01-01 RX ADMIN — METOPROLOL TARTRATE 5 MG: 5 INJECTION INTRAVENOUS at 16:19

## 2023-01-01 RX ADMIN — MAGNESIUM SULFATE HEPTAHYDRATE: 500 INJECTION, SOLUTION INTRAMUSCULAR; INTRAVENOUS at 21:18

## 2023-01-01 RX ADMIN — MORPHINE SULFATE 2 MG: 2 INJECTION, SOLUTION INTRAMUSCULAR; INTRAVENOUS at 03:44

## 2023-01-01 RX ADMIN — PANTOPRAZOLE SODIUM 40 MG: 40 TABLET, DELAYED RELEASE ORAL at 17:54

## 2023-01-01 RX ADMIN — HEPARIN SODIUM 5000 UNITS: 5000 INJECTION, SOLUTION INTRAVENOUS; SUBCUTANEOUS at 00:40

## 2023-01-01 RX ADMIN — OXYCODONE HYDROCHLORIDE 5 MG: 5 TABLET ORAL at 02:02

## 2023-01-01 RX ADMIN — ROSUVASTATIN CALCIUM 20 MG: 20 TABLET, FILM COATED ORAL at 08:18

## 2023-01-01 RX ADMIN — HEPARIN SODIUM 5000 UNITS: 5000 INJECTION, SOLUTION INTRAVENOUS; SUBCUTANEOUS at 08:43

## 2023-01-01 RX ADMIN — DICLOFENAC SODIUM 4 G: 10 GEL TOPICAL at 13:04

## 2023-01-01 RX ADMIN — MAGNESIUM SULFATE HEPTAHYDRATE: 500 INJECTION, SOLUTION INTRAMUSCULAR; INTRAVENOUS at 20:28

## 2023-01-01 RX ADMIN — PREDNISONE 20 MG: 20 TABLET ORAL at 08:49

## 2023-01-01 RX ADMIN — HYDROXYZINE HYDROCHLORIDE 10 MG: 10 TABLET ORAL at 00:07

## 2023-01-01 RX ADMIN — OXYCODONE HYDROCHLORIDE 5 MG: 5 TABLET ORAL at 04:13

## 2023-01-01 RX ADMIN — PANTOPRAZOLE SODIUM 40 MG: 40 INJECTION, POWDER, FOR SOLUTION INTRAVENOUS at 10:19

## 2023-01-01 RX ADMIN — LIDOCAINE: 50 OINTMENT TOPICAL at 18:20

## 2023-01-01 RX ADMIN — GABAPENTIN 100 MG: 100 CAPSULE ORAL at 22:40

## 2023-01-01 RX ADMIN — METOPROLOL TARTRATE 5 MG: 5 INJECTION INTRAVENOUS at 09:39

## 2023-01-01 RX ADMIN — METOPROLOL TARTRATE 50 MG: 50 TABLET, FILM COATED ORAL at 20:49

## 2023-01-01 RX ADMIN — LIDOCAINE: 50 OINTMENT TOPICAL at 08:17

## 2023-01-01 RX ADMIN — PHENYLEPHRINE HYDROCHLORIDE 100 MCG: 10 INJECTION INTRAVENOUS at 15:52

## 2023-01-01 RX ADMIN — PREDNISONE 20 MG: 20 TABLET ORAL at 11:54

## 2023-01-01 RX ADMIN — ONDANSETRON 4 MG: 2 INJECTION INTRAMUSCULAR; INTRAVENOUS at 13:11

## 2023-01-01 RX ADMIN — SODIUM CHLORIDE, POTASSIUM CHLORIDE, SODIUM LACTATE AND CALCIUM CHLORIDE: 600; 310; 30; 20 INJECTION, SOLUTION INTRAVENOUS at 00:35

## 2023-01-01 RX ADMIN — SODIUM CHLORIDE 5 UNITS: 9 INJECTION, SOLUTION INTRAVENOUS at 17:04

## 2023-01-01 RX ADMIN — ACETAMINOPHEN 975 MG: 325 TABLET, FILM COATED ORAL at 00:04

## 2023-01-01 RX ADMIN — ROSUVASTATIN CALCIUM 20 MG: 20 TABLET, FILM COATED ORAL at 08:15

## 2023-01-01 RX ADMIN — Medication: at 19:07

## 2023-01-01 RX ADMIN — METOPROLOL TARTRATE 2 MG: 5 INJECTION INTRAVENOUS at 15:01

## 2023-01-01 RX ADMIN — METOPROLOL TARTRATE 5 MG: 5 INJECTION INTRAVENOUS at 08:43

## 2023-01-01 RX ADMIN — SODIUM CHLORIDE, POTASSIUM CHLORIDE, SODIUM LACTATE AND CALCIUM CHLORIDE: 600; 310; 30; 20 INJECTION, SOLUTION INTRAVENOUS at 05:12

## 2023-01-01 RX ADMIN — HYDROXYZINE HYDROCHLORIDE 10 MG: 10 TABLET ORAL at 06:36

## 2023-01-01 RX ADMIN — SODIUM CHLORIDE: 9 INJECTION, SOLUTION INTRAVENOUS at 22:01

## 2023-01-01 RX ADMIN — OXYCODONE HYDROCHLORIDE 5 MG: 5 TABLET ORAL at 20:35

## 2023-01-01 RX ADMIN — POTASSIUM CHLORIDE, DEXTROSE MONOHYDRATE AND SODIUM CHLORIDE: 150; 5; 450 INJECTION, SOLUTION INTRAVENOUS at 04:02

## 2023-01-01 RX ADMIN — SODIUM CHLORIDE: 9 INJECTION, SOLUTION INTRAVENOUS at 11:53

## 2023-01-01 RX ADMIN — IRON SUCROSE 300 MG: 20 INJECTION, SOLUTION INTRAVENOUS at 14:14

## 2023-01-01 RX ADMIN — ASPIRIN 81 MG: 81 TABLET, COATED ORAL at 13:42

## 2023-01-01 RX ADMIN — PREDNISONE 10 MG: 5 TABLET ORAL at 08:18

## 2023-01-01 RX ADMIN — POTASSIUM CHLORIDE 10 MEQ: 7.46 INJECTION, SOLUTION INTRAVENOUS at 08:36

## 2023-01-01 RX ADMIN — Medication 1 LOZENGE: at 10:37

## 2023-01-01 RX ADMIN — METOPROLOL TARTRATE 5 MG: 5 INJECTION INTRAVENOUS at 08:33

## 2023-01-01 RX ADMIN — SODIUM CHLORIDE 250 ML: 9 INJECTION, SOLUTION INTRAVENOUS at 14:01

## 2023-01-01 RX ADMIN — METRONIDAZOLE 500 MG: 500 INJECTION, SOLUTION INTRAVENOUS at 11:51

## 2023-01-01 RX ADMIN — TOPICAL ANESTHETIC 1 ML: 200 SPRAY DENTAL; PERIODONTAL at 18:03

## 2023-01-01 RX ADMIN — SODIUM CHLORIDE: 9 INJECTION, SOLUTION INTRAVENOUS at 16:05

## 2023-01-01 RX ADMIN — SODIUM CHLORIDE: 9 INJECTION, SOLUTION INTRAVENOUS at 06:50

## 2023-01-01 RX ADMIN — LIDOCAINE PATCH 4% 2 PATCH: 40 PATCH TOPICAL at 08:41

## 2023-01-01 RX ADMIN — METOPROLOL TARTRATE 5 MG: 5 INJECTION INTRAVENOUS at 04:15

## 2023-01-01 RX ADMIN — HYDROMORPHONE HYDROCHLORIDE 0.5 MG: 1 INJECTION, SOLUTION INTRAMUSCULAR; INTRAVENOUS; SUBCUTANEOUS at 13:47

## 2023-01-01 RX ADMIN — PREDNISONE 10 MG: 5 TABLET ORAL at 08:28

## 2023-01-01 RX ADMIN — INSULIN ASPART 2 UNITS: 100 INJECTION, SOLUTION INTRAVENOUS; SUBCUTANEOUS at 05:46

## 2023-01-01 RX ADMIN — POTASSIUM & SODIUM PHOSPHATES POWDER PACK 280-160-250 MG 1 PACKET: 280-160-250 PACK at 08:11

## 2023-01-01 RX ADMIN — GLYCOPYRROLATE 0.2 MG: 0.2 INJECTION, SOLUTION INTRAMUSCULAR; INTRAVENOUS at 14:09

## 2023-01-01 RX ADMIN — METOPROLOL TARTRATE 5 MG: 5 INJECTION INTRAVENOUS at 21:01

## 2023-01-01 RX ADMIN — POTASSIUM & SODIUM PHOSPHATES POWDER PACK 280-160-250 MG 1 PACKET: 280-160-250 PACK at 07:40

## 2023-01-01 RX ADMIN — SODIUM CHLORIDE: 9 INJECTION, SOLUTION INTRAVENOUS at 23:00

## 2023-01-01 RX ADMIN — PANTOPRAZOLE SODIUM 40 MG: 40 INJECTION, POWDER, FOR SOLUTION INTRAVENOUS at 08:43

## 2023-01-01 RX ADMIN — LIDOCAINE HYDROCHLORIDE 30 MG: 20 INJECTION, SOLUTION INFILTRATION; PERINEURAL at 14:09

## 2023-01-01 RX ADMIN — ONDANSETRON 4 MG: 2 INJECTION INTRAMUSCULAR; INTRAVENOUS at 11:40

## 2023-01-01 RX ADMIN — HYDROXYZINE HYDROCHLORIDE 10 MG: 10 TABLET ORAL at 18:25

## 2023-01-01 RX ADMIN — LIDOCAINE: 50 OINTMENT TOPICAL at 22:40

## 2023-01-01 RX ADMIN — INSULIN ASPART 1 UNITS: 100 INJECTION, SOLUTION INTRAVENOUS; SUBCUTANEOUS at 22:45

## 2023-01-01 RX ADMIN — METOPROLOL TARTRATE 5 MG: 5 INJECTION INTRAVENOUS at 04:10

## 2023-01-01 RX ADMIN — DICLOFENAC SODIUM 4 G: 10 GEL TOPICAL at 10:20

## 2023-01-01 RX ADMIN — METOPROLOL TARTRATE 50 MG: 50 TABLET, FILM COATED ORAL at 08:10

## 2023-01-01 RX ADMIN — ACETAMINOPHEN 975 MG: 325 TABLET, FILM COATED ORAL at 00:07

## 2023-01-01 RX ADMIN — DEXAMETHASONE SODIUM PHOSPHATE 8 MG: 4 INJECTION, SOLUTION INTRA-ARTICULAR; INTRALESIONAL; INTRAMUSCULAR; INTRAVENOUS; SOFT TISSUE at 13:18

## 2023-01-01 RX ADMIN — ROCURONIUM BROMIDE 50 MG: 50 INJECTION, SOLUTION INTRAVENOUS at 13:18

## 2023-01-01 RX ADMIN — HYDROXYZINE HYDROCHLORIDE 10 MG: 10 TABLET ORAL at 00:32

## 2023-01-01 RX ADMIN — POLYETHYLENE GLYCOL 3350, SODIUM SULFATE ANHYDROUS, SODIUM BICARBONATE, SODIUM CHLORIDE, POTASSIUM CHLORIDE 4000 ML: 236; 22.74; 6.74; 5.86; 2.97 POWDER, FOR SOLUTION ORAL at 03:29

## 2023-01-01 RX ADMIN — OXYCODONE HYDROCHLORIDE 5 MG: 5 TABLET ORAL at 16:29

## 2023-01-01 RX ADMIN — DICLOFENAC SODIUM 4 G: 10 GEL TOPICAL at 17:54

## 2023-01-01 RX ADMIN — INSULIN ASPART 2 UNITS: 100 INJECTION, SOLUTION INTRAVENOUS; SUBCUTANEOUS at 21:47

## 2023-01-01 RX ADMIN — HEPARIN SODIUM 5000 UNITS: 5000 INJECTION, SOLUTION INTRAVENOUS; SUBCUTANEOUS at 08:05

## 2023-01-01 RX ADMIN — TOPICAL ANESTHETIC 1 ML: 200 SPRAY DENTAL; PERIODONTAL at 15:14

## 2023-01-01 RX ADMIN — METOPROLOL TARTRATE 5 MG: 5 INJECTION INTRAVENOUS at 15:44

## 2023-01-01 RX ADMIN — POTASSIUM CHLORIDE 10 MEQ: 7.46 INJECTION, SOLUTION INTRAVENOUS at 10:14

## 2023-01-01 RX ADMIN — TOPICAL ANESTHETIC 0.5 ML: 200 SPRAY DENTAL; PERIODONTAL at 21:27

## 2023-01-01 RX ADMIN — HYDROMORPHONE HYDROCHLORIDE 0.25 MG: 1 INJECTION, SOLUTION INTRAMUSCULAR; INTRAVENOUS; SUBCUTANEOUS at 14:03

## 2023-01-01 RX ADMIN — METOPROLOL SUCCINATE 100 MG: 100 TABLET, EXTENDED RELEASE ORAL at 08:58

## 2023-01-01 RX ADMIN — GABAPENTIN 100 MG: 100 CAPSULE ORAL at 16:14

## 2023-01-01 RX ADMIN — GABAPENTIN 100 MG: 100 CAPSULE ORAL at 08:50

## 2023-01-01 RX ADMIN — POTASSIUM & SODIUM PHOSPHATES POWDER PACK 280-160-250 MG 1 PACKET: 280-160-250 PACK at 16:02

## 2023-01-01 RX ADMIN — HEPARIN SODIUM 5000 UNITS: 5000 INJECTION, SOLUTION INTRAVENOUS; SUBCUTANEOUS at 16:20

## 2023-01-01 RX ADMIN — METOPROLOL TARTRATE 5 MG: 5 INJECTION INTRAVENOUS at 21:30

## 2023-01-01 RX ADMIN — HEPARIN SODIUM 5000 UNITS: 5000 INJECTION, SOLUTION INTRAVENOUS; SUBCUTANEOUS at 17:21

## 2023-01-01 RX ADMIN — ONDANSETRON 4 MG: 2 INJECTION INTRAMUSCULAR; INTRAVENOUS at 23:03

## 2023-01-01 RX ADMIN — TOPICAL ANESTHETIC 1 ML: 200 SPRAY DENTAL; PERIODONTAL at 22:06

## 2023-01-01 RX ADMIN — SODIUM CHLORIDE 250 ML: 9 INJECTION, SOLUTION INTRAVENOUS at 14:21

## 2023-01-01 RX ADMIN — PANTOPRAZOLE SODIUM 40 MG: 40 TABLET, DELAYED RELEASE ORAL at 08:41

## 2023-01-01 RX ADMIN — ROSUVASTATIN CALCIUM 20 MG: 20 TABLET, FILM COATED ORAL at 09:39

## 2023-01-01 RX ADMIN — POTASSIUM CHLORIDE 10 MEQ: 7.46 INJECTION, SOLUTION INTRAVENOUS at 11:22

## 2023-01-01 RX ADMIN — FENTANYL CITRATE 100 MCG: 50 INJECTION, SOLUTION INTRAMUSCULAR; INTRAVENOUS at 13:16

## 2023-01-01 RX ADMIN — SODIUM CHLORIDE, POTASSIUM CHLORIDE, SODIUM LACTATE AND CALCIUM CHLORIDE: 600; 310; 30; 20 INJECTION, SOLUTION INTRAVENOUS at 20:27

## 2023-01-01 RX ADMIN — IRON SUCROSE 300 MG: 20 INJECTION, SOLUTION INTRAVENOUS at 14:02

## 2023-01-01 RX ADMIN — CALCIUM 500 MG: 500 TABLET ORAL at 08:42

## 2023-01-01 RX ADMIN — METOPROLOL TARTRATE 5 MG: 5 INJECTION INTRAVENOUS at 15:49

## 2023-01-01 RX ADMIN — PANTOPRAZOLE SODIUM 40 MG: 40 INJECTION, POWDER, FOR SOLUTION INTRAVENOUS at 22:18

## 2023-01-01 RX ADMIN — Medication 1 LOZENGE: at 23:32

## 2023-01-01 RX ADMIN — HYDROMORPHONE HYDROCHLORIDE 0.25 MG: 1 INJECTION, SOLUTION INTRAMUSCULAR; INTRAVENOUS; SUBCUTANEOUS at 14:07

## 2023-01-01 RX ADMIN — HYDROMORPHONE HYDROCHLORIDE 0.2 MG: 0.2 INJECTION, SOLUTION INTRAMUSCULAR; INTRAVENOUS; SUBCUTANEOUS at 11:23

## 2023-01-01 RX ADMIN — LIDOCAINE PATCH 4% 2 PATCH: 40 PATCH TOPICAL at 10:20

## 2023-01-01 RX ADMIN — SODIUM CHLORIDE: 9 INJECTION, SOLUTION INTRAVENOUS at 02:05

## 2023-01-01 RX ADMIN — METOPROLOL SUCCINATE 100 MG: 100 TABLET, EXTENDED RELEASE ORAL at 09:39

## 2023-01-01 RX ADMIN — DICLOFENAC SODIUM 4 G: 10 GEL TOPICAL at 18:25

## 2023-01-01 RX ADMIN — ACETAMINOPHEN 975 MG: 325 TABLET, FILM COATED ORAL at 11:30

## 2023-01-01 RX ADMIN — HYDROMORPHONE HYDROCHLORIDE 0.2 MG: 0.2 INJECTION, SOLUTION INTRAMUSCULAR; INTRAVENOUS; SUBCUTANEOUS at 20:11

## 2023-01-01 RX ADMIN — METOPROLOL TARTRATE 50 MG: 50 TABLET, FILM COATED ORAL at 20:50

## 2023-01-01 RX ADMIN — POLYETHYLENE GLYCOL 3350, SODIUM SULFATE ANHYDROUS, SODIUM BICARBONATE, SODIUM CHLORIDE, POTASSIUM CHLORIDE 4000 ML: 236; 22.74; 6.74; 5.86; 2.97 POWDER, FOR SOLUTION ORAL at 16:19

## 2023-01-01 RX ADMIN — GABAPENTIN 100 MG: 100 CAPSULE ORAL at 16:00

## 2023-01-01 RX ADMIN — POTASSIUM CHLORIDE 40 MEQ: 1500 TABLET, EXTENDED RELEASE ORAL at 08:40

## 2023-01-01 RX ADMIN — SODIUM CHLORIDE, POTASSIUM CHLORIDE, SODIUM LACTATE AND CALCIUM CHLORIDE: 600; 310; 30; 20 INJECTION, SOLUTION INTRAVENOUS at 03:15

## 2023-01-01 RX ADMIN — ACETAMINOPHEN 975 MG: 325 TABLET, FILM COATED ORAL at 02:13

## 2023-01-01 RX ADMIN — PROPOFOL 30 MG: 10 INJECTION, EMULSION INTRAVENOUS at 13:18

## 2023-01-01 RX ADMIN — OXYCODONE HYDROCHLORIDE 5 MG: 5 TABLET ORAL at 02:14

## 2023-01-01 RX ADMIN — DICLOFENAC SODIUM 4 G: 10 GEL TOPICAL at 22:08

## 2023-01-01 RX ADMIN — PANTOPRAZOLE SODIUM 40 MG: 40 INJECTION, POWDER, FOR SOLUTION INTRAVENOUS at 09:39

## 2023-01-01 RX ADMIN — HYDROMORPHONE HYDROCHLORIDE 2 MG: 2 TABLET ORAL at 09:39

## 2023-01-01 RX ADMIN — MAGNESIUM SULFATE HEPTAHYDRATE: 500 INJECTION, SOLUTION INTRAMUSCULAR; INTRAVENOUS at 20:38

## 2023-01-01 RX ADMIN — METOPROLOL TARTRATE 5 MG: 5 INJECTION INTRAVENOUS at 09:33

## 2023-01-01 RX ADMIN — PREDNISONE 10 MG: 5 TABLET ORAL at 08:10

## 2023-01-01 RX ADMIN — POTASSIUM CHLORIDE 20 MEQ: 1500 TABLET, EXTENDED RELEASE ORAL at 11:47

## 2023-01-01 RX ADMIN — POTASSIUM & SODIUM PHOSPHATES POWDER PACK 280-160-250 MG 1 PACKET: 280-160-250 PACK at 16:00

## 2023-01-01 RX ADMIN — ALBUTEROL SULFATE 2.5 MG: 2.5 SOLUTION RESPIRATORY (INHALATION) at 16:52

## 2023-01-01 RX ADMIN — METOPROLOL TARTRATE 25 MG: 25 TABLET, FILM COATED ORAL at 21:03

## 2023-01-01 RX ADMIN — METOPROLOL TARTRATE 1 MG: 5 INJECTION INTRAVENOUS at 14:14

## 2023-01-01 RX ADMIN — ASCORBIC ACID, VITAMIN A PALMITATE, CHOLECALCIFEROL, THIAMINE HYDROCHLORIDE, RIBOFLAVIN-5 PHOSPHATE SODIUM, PYRIDOXINE HYDROCHLORIDE, NIACINAMIDE, DEXPANTHENOL, ALPHA-TOCOPHEROL ACETATE, VITAMIN K1, FOLIC ACID, BIOTIN, CYANOCOBALAMIN: 200; 3300; 200; 6; 3.6; 6; 40; 15; 10; 150; 600; 60; 5 INJECTION, SOLUTION INTRAVENOUS at 21:49

## 2023-01-01 RX ADMIN — LIDOCAINE HYDROCHLORIDE 50 MG: 20 INJECTION, SOLUTION INFILTRATION; PERINEURAL at 13:16

## 2023-01-01 RX ADMIN — HYDROXYZINE HYDROCHLORIDE 10 MG: 10 TABLET ORAL at 12:13

## 2023-01-01 RX ADMIN — HEPARIN SODIUM 5000 UNITS: 5000 INJECTION, SOLUTION INTRAVENOUS; SUBCUTANEOUS at 23:58

## 2023-01-01 RX ADMIN — OXYCODONE HYDROCHLORIDE 10 MG: 5 TABLET ORAL at 08:29

## 2023-01-01 RX ADMIN — ACETAMINOPHEN 975 MG: 325 TABLET, FILM COATED ORAL at 11:02

## 2023-01-01 RX ADMIN — INSULIN GLARGINE 7 UNITS: 100 INJECTION, SOLUTION SUBCUTANEOUS at 16:05

## 2023-01-01 RX ADMIN — PANTOPRAZOLE SODIUM 40 MG: 40 INJECTION, POWDER, FOR SOLUTION INTRAVENOUS at 13:46

## 2023-01-01 RX ADMIN — ONDANSETRON 4 MG: 2 INJECTION INTRAMUSCULAR; INTRAVENOUS at 23:56

## 2023-01-01 RX ADMIN — CALCIUM 500 MG: 500 TABLET ORAL at 17:40

## 2023-01-01 RX ADMIN — PROCHLORPERAZINE EDISYLATE 5 MG: 5 INJECTION INTRAMUSCULAR; INTRAVENOUS at 00:31

## 2023-01-01 RX ADMIN — PANTOPRAZOLE SODIUM 40 MG: 40 INJECTION, POWDER, FOR SOLUTION INTRAVENOUS at 08:17

## 2023-01-01 RX ADMIN — CALCIUM GLUCONATE 1 G: 20 INJECTION, SOLUTION INTRAVENOUS at 17:45

## 2023-01-01 RX ADMIN — THIAMINE HCL TAB 100 MG 100 MG: 100 TAB at 08:10

## 2023-01-01 RX ADMIN — ROCURONIUM BROMIDE 20 MG: 50 INJECTION, SOLUTION INTRAVENOUS at 15:40

## 2023-01-01 RX ADMIN — HYDROCORTISONE SODIUM SUCCINATE 40 MG: 100 INJECTION, POWDER, FOR SOLUTION INTRAMUSCULAR; INTRAVENOUS at 08:15

## 2023-01-01 RX ADMIN — ONDANSETRON 4 MG: 2 INJECTION INTRAMUSCULAR; INTRAVENOUS at 07:40

## 2023-01-01 RX ADMIN — SODIUM PHOSPHATE, MONOBASIC, MONOHYDRATE AND SODIUM PHOSPHATE, DIBASIC, ANHYDROUS 15 MMOL: 142; 276 INJECTION, SOLUTION INTRAVENOUS at 10:57

## 2023-01-01 RX ADMIN — PHENYLEPHRINE HYDROCHLORIDE 200 MCG: 10 INJECTION INTRAVENOUS at 13:26

## 2023-01-01 RX ADMIN — TOPICAL ANESTHETIC 0.5 ML: 200 SPRAY DENTAL; PERIODONTAL at 12:55

## 2023-01-01 RX ADMIN — PROPOFOL 50 MG: 10 INJECTION, EMULSION INTRAVENOUS at 13:16

## 2023-01-01 RX ADMIN — INSULIN ASPART 3 UNITS: 100 INJECTION, SOLUTION INTRAVENOUS; SUBCUTANEOUS at 17:53

## 2023-01-01 RX ADMIN — METOPROLOL SUCCINATE 100 MG: 100 TABLET, EXTENDED RELEASE ORAL at 08:15

## 2023-01-01 RX ADMIN — PANTOPRAZOLE SODIUM 40 MG: 40 TABLET, DELAYED RELEASE ORAL at 08:11

## 2023-01-01 RX ADMIN — LIDOCAINE: 50 OINTMENT TOPICAL at 13:05

## 2023-01-01 RX ADMIN — Medication 2 G: at 13:10

## 2023-01-01 RX ADMIN — GABAPENTIN 100 MG: 100 CAPSULE ORAL at 22:18

## 2023-01-01 RX ADMIN — INSULIN ASPART 3 UNITS: 100 INJECTION, SOLUTION INTRAVENOUS; SUBCUTANEOUS at 21:19

## 2023-01-01 RX ADMIN — ACETAMINOPHEN 975 MG: 325 TABLET, FILM COATED ORAL at 00:32

## 2023-01-01 RX ADMIN — ROSUVASTATIN CALCIUM 20 MG: 20 TABLET, FILM COATED ORAL at 08:28

## 2023-01-01 RX ADMIN — OXYCODONE HYDROCHLORIDE 5 MG: 5 TABLET ORAL at 04:49

## 2023-01-01 RX ADMIN — ALBUMIN HUMAN: 0.05 INJECTION, SOLUTION INTRAVENOUS at 13:49

## 2023-01-01 RX ADMIN — PHENYLEPHRINE HYDROCHLORIDE 100 MCG: 10 INJECTION INTRAVENOUS at 13:18

## 2023-01-01 RX ADMIN — PANTOPRAZOLE SODIUM 40 MG: 40 INJECTION, POWDER, FOR SOLUTION INTRAVENOUS at 09:17

## 2023-01-01 RX ADMIN — SODIUM CHLORIDE 64 ML: 9 INJECTION, SOLUTION INTRAVENOUS at 16:53

## 2023-01-01 RX ADMIN — HYDROMORPHONE HYDROCHLORIDE 0.5 MG: 1 INJECTION, SOLUTION INTRAMUSCULAR; INTRAVENOUS; SUBCUTANEOUS at 16:19

## 2023-01-01 RX ADMIN — POTASSIUM CHLORIDE, DEXTROSE MONOHYDRATE AND SODIUM CHLORIDE: 150; 5; 450 INJECTION, SOLUTION INTRAVENOUS at 21:03

## 2023-01-01 RX ADMIN — LIDOCAINE PATCH 4% 2 PATCH: 40 PATCH TOPICAL at 09:42

## 2023-01-01 RX ADMIN — MAGNESIUM SULFATE HEPTAHYDRATE: 500 INJECTION, SOLUTION INTRAMUSCULAR; INTRAVENOUS at 20:53

## 2023-01-01 RX ADMIN — SODIUM CHLORIDE, POTASSIUM CHLORIDE, SODIUM LACTATE AND CALCIUM CHLORIDE: 600; 310; 30; 20 INJECTION, SOLUTION INTRAVENOUS at 13:34

## 2023-01-01 RX ADMIN — POTASSIUM CHLORIDE, DEXTROSE MONOHYDRATE AND SODIUM CHLORIDE: 150; 5; 450 INJECTION, SOLUTION INTRAVENOUS at 16:00

## 2023-01-01 RX ADMIN — ACETAMINOPHEN 650 MG: 325 TABLET, FILM COATED ORAL at 09:50

## 2023-01-01 RX ADMIN — LIDOCAINE: 50 OINTMENT TOPICAL at 22:08

## 2023-01-01 RX ADMIN — INSULIN GLARGINE 5 UNITS: 100 INJECTION, SOLUTION SUBCUTANEOUS at 10:00

## 2023-01-01 RX ADMIN — INSULIN GLARGINE 5 UNITS: 100 INJECTION, SOLUTION SUBCUTANEOUS at 10:44

## 2023-01-01 RX ADMIN — GABAPENTIN 100 MG: 100 CAPSULE ORAL at 21:29

## 2023-01-01 RX ADMIN — ALBUMIN HUMAN: 0.05 INJECTION, SOLUTION INTRAVENOUS at 13:40

## 2023-01-01 RX ADMIN — OXYCODONE HYDROCHLORIDE 5 MG: 5 TABLET ORAL at 20:19

## 2023-01-01 RX ADMIN — HYDROXYZINE HYDROCHLORIDE 25 MG: 25 TABLET, FILM COATED ORAL at 11:54

## 2023-01-01 RX ADMIN — HEPARIN SODIUM 5000 UNITS: 5000 INJECTION, SOLUTION INTRAVENOUS; SUBCUTANEOUS at 00:37

## 2023-01-01 RX ADMIN — HEPARIN SODIUM 5000 UNITS: 5000 INJECTION, SOLUTION INTRAVENOUS; SUBCUTANEOUS at 08:15

## 2023-01-01 RX ADMIN — HYDROCORTISONE SODIUM SUCCINATE 40 MG: 100 INJECTION, POWDER, FOR SOLUTION INTRAMUSCULAR; INTRAVENOUS at 08:43

## 2023-01-01 RX ADMIN — HEPARIN SODIUM 5000 UNITS: 5000 INJECTION, SOLUTION INTRAVENOUS; SUBCUTANEOUS at 16:00

## 2023-01-01 RX ADMIN — THIAMINE HCL TAB 100 MG 100 MG: 100 TAB at 08:41

## 2023-01-01 RX ADMIN — ACETAMINOPHEN 975 MG: 325 TABLET, FILM COATED ORAL at 20:10

## 2023-01-01 RX ADMIN — ROSUVASTATIN CALCIUM 20 MG: 20 TABLET, FILM COATED ORAL at 08:49

## 2023-01-01 RX ADMIN — HYDROXYZINE HYDROCHLORIDE 10 MG: 10 TABLET ORAL at 00:04

## 2023-01-01 RX ADMIN — LIDOCAINE: 50 OINTMENT TOPICAL at 18:25

## 2023-01-01 RX ADMIN — POTASSIUM & SODIUM PHOSPHATES POWDER PACK 280-160-250 MG 1 PACKET: 280-160-250 PACK at 11:47

## 2023-01-01 RX ADMIN — METOPROLOL TARTRATE 5 MG: 5 INJECTION INTRAVENOUS at 03:49

## 2023-01-01 RX ADMIN — HEPARIN SODIUM 5000 UNITS: 5000 INJECTION, SOLUTION INTRAVENOUS; SUBCUTANEOUS at 17:02

## 2023-01-01 RX ADMIN — ACETAMINOPHEN 975 MG: 325 TABLET, FILM COATED ORAL at 16:00

## 2023-01-01 RX ADMIN — METOPROLOL TARTRATE 5 MG: 5 INJECTION INTRAVENOUS at 20:50

## 2023-01-01 RX ADMIN — CALCIUM 500 MG: 500 TABLET ORAL at 17:42

## 2023-01-01 RX ADMIN — HEPARIN SODIUM 5000 UNITS: 5000 INJECTION, SOLUTION INTRAVENOUS; SUBCUTANEOUS at 16:33

## 2023-01-01 RX ADMIN — LIDOCAINE: 50 OINTMENT TOPICAL at 10:21

## 2023-01-01 RX ADMIN — METOPROLOL TARTRATE 5 MG: 5 INJECTION INTRAVENOUS at 03:58

## 2023-01-01 RX ADMIN — PANTOPRAZOLE SODIUM 40 MG: 40 TABLET, DELAYED RELEASE ORAL at 08:28

## 2023-01-01 RX ADMIN — TOPICAL ANESTHETIC 0.5 ML: 200 SPRAY DENTAL; PERIODONTAL at 08:22

## 2023-01-01 RX ADMIN — HYDROMORPHONE HYDROCHLORIDE 0.2 MG: 0.2 INJECTION, SOLUTION INTRAMUSCULAR; INTRAVENOUS; SUBCUTANEOUS at 22:53

## 2023-01-01 RX ADMIN — ROSUVASTATIN CALCIUM 20 MG: 20 TABLET, FILM COATED ORAL at 08:58

## 2023-01-01 RX ADMIN — PREDNISONE 10 MG: 5 TABLET ORAL at 08:42

## 2023-01-01 RX ADMIN — HYDROXYZINE HYDROCHLORIDE 10 MG: 10 TABLET ORAL at 13:04

## 2023-01-01 RX ADMIN — ACETAMINOPHEN 975 MG: 325 TABLET, FILM COATED ORAL at 03:10

## 2023-01-01 RX ADMIN — SODIUM CHLORIDE, POTASSIUM CHLORIDE, SODIUM LACTATE AND CALCIUM CHLORIDE: 600; 310; 30; 20 INJECTION, SOLUTION INTRAVENOUS at 15:06

## 2023-01-01 RX ADMIN — LIDOCAINE PATCH 4% 2 PATCH: 40 PATCH TOPICAL at 08:14

## 2023-01-01 RX ADMIN — INSULIN ASPART 3 UNITS: 100 INJECTION, SOLUTION INTRAVENOUS; SUBCUTANEOUS at 01:44

## 2023-01-01 RX ADMIN — SODIUM CHLORIDE 5 UNITS: 9 INJECTION, SOLUTION INTRAVENOUS at 18:54

## 2023-01-01 RX ADMIN — METOPROLOL SUCCINATE 100 MG: 100 TABLET, EXTENDED RELEASE ORAL at 08:49

## 2023-01-01 RX ADMIN — HYDROMORPHONE HYDROCHLORIDE 0.5 MG: 1 INJECTION, SOLUTION INTRAMUSCULAR; INTRAVENOUS; SUBCUTANEOUS at 14:41

## 2023-01-01 RX ADMIN — PANTOPRAZOLE SODIUM 40 MG: 40 INJECTION, POWDER, FOR SOLUTION INTRAVENOUS at 09:30

## 2023-01-01 RX ADMIN — INSULIN ASPART 1 UNITS: 100 INJECTION, SOLUTION INTRAVENOUS; SUBCUTANEOUS at 22:37

## 2023-01-01 RX ADMIN — ONDANSETRON 4 MG: 2 INJECTION INTRAMUSCULAR; INTRAVENOUS at 17:50

## 2023-01-01 RX ADMIN — METOPROLOL TARTRATE 50 MG: 50 TABLET, FILM COATED ORAL at 08:41

## 2023-01-01 RX ADMIN — LIDOCAINE PATCH 4% 2 PATCH: 40 PATCH TOPICAL at 08:42

## 2023-01-01 RX ADMIN — Medication 1 LOZENGE: at 01:10

## 2023-01-01 RX ADMIN — CALCIUM GLUCONATE 1 G: 20 INJECTION, SOLUTION INTRAVENOUS at 09:45

## 2023-01-01 RX ADMIN — HYDROXYZINE HYDROCHLORIDE 10 MG: 10 TABLET ORAL at 17:28

## 2023-01-01 RX ADMIN — PANTOPRAZOLE SODIUM 40 MG: 40 INJECTION, POWDER, FOR SOLUTION INTRAVENOUS at 22:58

## 2023-01-01 RX ADMIN — METOPROLOL SUCCINATE 100 MG: 100 TABLET, EXTENDED RELEASE ORAL at 08:28

## 2023-01-01 RX ADMIN — HEPARIN SODIUM 5000 UNITS: 5000 INJECTION, SOLUTION INTRAVENOUS; SUBCUTANEOUS at 00:05

## 2023-01-01 RX ADMIN — PREDNISONE 10 MG: 5 TABLET ORAL at 08:40

## 2023-01-01 RX ADMIN — METOPROLOL TARTRATE 5 MG: 5 INJECTION INTRAVENOUS at 17:58

## 2023-01-01 RX ADMIN — GABAPENTIN 100 MG: 100 CAPSULE ORAL at 16:55

## 2023-01-01 RX ADMIN — LORAZEPAM 0.25 MG: 2 INJECTION INTRAMUSCULAR; INTRAVENOUS at 06:00

## 2023-01-01 RX ADMIN — ACETAMINOPHEN 975 MG: 325 TABLET, FILM COATED ORAL at 16:55

## 2023-01-01 RX ADMIN — PANTOPRAZOLE SODIUM 40 MG: 40 TABLET, DELAYED RELEASE ORAL at 08:18

## 2023-01-01 RX ADMIN — METOPROLOL TARTRATE 5 MG: 5 INJECTION INTRAVENOUS at 21:29

## 2023-01-01 RX ADMIN — HEPARIN SODIUM 5000 UNITS: 5000 INJECTION INTRAVENOUS; SUBCUTANEOUS at 11:21

## 2023-01-01 RX ADMIN — SODIUM CHLORIDE, POTASSIUM CHLORIDE, SODIUM LACTATE AND CALCIUM CHLORIDE 1000 ML: 600; 310; 30; 20 INJECTION, SOLUTION INTRAVENOUS at 09:33

## 2023-01-01 RX ADMIN — PANTOPRAZOLE SODIUM 40 MG: 40 TABLET, DELAYED RELEASE ORAL at 16:55

## 2023-01-01 RX ADMIN — SODIUM CHLORIDE: 9 INJECTION, SOLUTION INTRAVENOUS at 13:10

## 2023-01-01 RX ADMIN — METOPROLOL TARTRATE 1 MG: 5 INJECTION INTRAVENOUS at 14:11

## 2023-01-01 RX ADMIN — SODIUM CHLORIDE 1000 ML: 9 INJECTION, SOLUTION INTRAVENOUS at 11:57

## 2023-01-01 RX ADMIN — HYDROCORTISONE SODIUM SUCCINATE 40 MG: 100 INJECTION, POWDER, FOR SOLUTION INTRAMUSCULAR; INTRAVENOUS at 09:44

## 2023-01-01 RX ADMIN — PROPOFOL 150 MCG/KG/MIN: 10 INJECTION, EMULSION INTRAVENOUS at 14:09

## 2023-01-01 RX ADMIN — SODIUM CHLORIDE, POTASSIUM CHLORIDE, SODIUM LACTATE AND CALCIUM CHLORIDE: 600; 310; 30; 20 INJECTION, SOLUTION INTRAVENOUS at 13:29

## 2023-01-01 RX ADMIN — SODIUM CHLORIDE, POTASSIUM CHLORIDE, SODIUM LACTATE AND CALCIUM CHLORIDE: 600; 310; 30; 20 INJECTION, SOLUTION INTRAVENOUS at 11:50

## 2023-01-01 RX ADMIN — PREDNISONE 10 MG: 5 TABLET ORAL at 08:57

## 2023-01-01 RX ADMIN — POTASSIUM CHLORIDE 10 MEQ: 7.46 INJECTION, SOLUTION INTRAVENOUS at 07:01

## 2023-01-01 RX ADMIN — HEPARIN SODIUM 5000 UNITS: 5000 INJECTION, SOLUTION INTRAVENOUS; SUBCUTANEOUS at 10:19

## 2023-01-01 RX ADMIN — METOPROLOL SUCCINATE 100 MG: 100 TABLET, EXTENDED RELEASE ORAL at 09:17

## 2023-01-01 RX ADMIN — POTASSIUM CHLORIDE, DEXTROSE MONOHYDRATE AND SODIUM CHLORIDE: 150; 5; 450 INJECTION, SOLUTION INTRAVENOUS at 18:05

## 2023-01-01 RX ADMIN — HEPARIN SODIUM 5000 UNITS: 5000 INJECTION, SOLUTION INTRAVENOUS; SUBCUTANEOUS at 08:18

## 2023-01-01 RX ADMIN — IOPAMIDOL 62 ML: 755 INJECTION, SOLUTION INTRAVENOUS at 12:19

## 2023-01-01 RX ADMIN — HYDROCORTISONE SODIUM SUCCINATE 40 MG: 100 INJECTION, POWDER, FOR SOLUTION INTRAMUSCULAR; INTRAVENOUS at 09:34

## 2023-01-01 RX ADMIN — ACETAMINOPHEN 975 MG: 325 TABLET, FILM COATED ORAL at 08:49

## 2023-01-01 RX ADMIN — THIAMINE HCL TAB 100 MG 100 MG: 100 TAB at 08:42

## 2023-01-01 RX ADMIN — DICLOFENAC SODIUM 4 G: 10 GEL TOPICAL at 18:17

## 2023-01-01 RX ADMIN — SODIUM CHLORIDE: 9 INJECTION, SOLUTION INTRAVENOUS at 13:37

## 2023-01-01 RX ADMIN — OLIVE OIL AND SOYBEAN OIL 250 ML: 16; 4 INJECTION, EMULSION INTRAVENOUS at 21:17

## 2023-01-01 RX ADMIN — SODIUM CHLORIDE, POTASSIUM CHLORIDE, SODIUM LACTATE AND CALCIUM CHLORIDE: 600; 310; 30; 20 INJECTION, SOLUTION INTRAVENOUS at 00:43

## 2023-01-01 RX ADMIN — HEPARIN SODIUM 5000 UNITS: 5000 INJECTION, SOLUTION INTRAVENOUS; SUBCUTANEOUS at 16:03

## 2023-01-01 RX ADMIN — LIDOCAINE PATCH 4% 2 PATCH: 40 PATCH TOPICAL at 08:34

## 2023-01-01 RX ADMIN — IRON SUCROSE 300 MG: 20 INJECTION, SOLUTION INTRAVENOUS at 14:21

## 2023-01-01 RX ADMIN — INSULIN ASPART 3 UNITS: 100 INJECTION, SOLUTION INTRAVENOUS; SUBCUTANEOUS at 09:45

## 2023-01-01 RX ADMIN — PHENYLEPHRINE HYDROCHLORIDE 100 MCG: 10 INJECTION INTRAVENOUS at 16:39

## 2023-01-01 RX ADMIN — SODIUM CHLORIDE: 9 INJECTION, SOLUTION INTRAVENOUS at 05:11

## 2023-01-01 RX ADMIN — METOPROLOL SUCCINATE 100 MG: 100 TABLET, EXTENDED RELEASE ORAL at 08:18

## 2023-01-01 RX ADMIN — Medication 2 G: at 17:13

## 2023-01-01 RX ADMIN — ACETAMINOPHEN 975 MG: 325 TABLET, FILM COATED ORAL at 12:44

## 2023-01-01 RX ADMIN — ACETAMINOPHEN 975 MG: 325 TABLET, FILM COATED ORAL at 16:14

## 2023-01-01 RX ADMIN — PANTOPRAZOLE SODIUM 40 MG: 40 INJECTION, POWDER, FOR SOLUTION INTRAVENOUS at 09:37

## 2023-01-01 RX ADMIN — ACETAMINOPHEN 975 MG: 325 TABLET, FILM COATED ORAL at 08:15

## 2023-01-01 RX ADMIN — METOPROLOL TARTRATE 1 MG: 5 INJECTION INTRAVENOUS at 14:45

## 2023-01-01 RX ADMIN — ONDANSETRON 4 MG: 2 INJECTION INTRAMUSCULAR; INTRAVENOUS at 14:09

## 2023-01-01 RX ADMIN — DICLOFENAC SODIUM 4 G: 10 GEL TOPICAL at 08:16

## 2023-01-01 RX ADMIN — HYDROXYZINE HYDROCHLORIDE 10 MG: 10 TABLET ORAL at 17:54

## 2023-01-01 RX ADMIN — METOPROLOL TARTRATE 5 MG: 5 INJECTION INTRAVENOUS at 09:34

## 2023-01-01 RX ADMIN — SODIUM PHOSPHATE, MONOBASIC, MONOHYDRATE AND SODIUM PHOSPHATE, DIBASIC, ANHYDROUS 15 MMOL: 142; 276 INJECTION, SOLUTION INTRAVENOUS at 08:27

## 2023-01-01 RX ADMIN — PANTOPRAZOLE SODIUM 40 MG: 40 INJECTION, POWDER, FOR SOLUTION INTRAVENOUS at 09:33

## 2023-01-01 RX ADMIN — PANTOPRAZOLE SODIUM 40 MG: 40 TABLET, DELAYED RELEASE ORAL at 08:58

## 2023-01-01 RX ADMIN — DIATRIZOATE MEGLUMINE AND DIATRIZOATE SODIUM 120 ML: 660; 100 SOLUTION ORAL; RECTAL at 15:34

## 2023-01-01 RX ADMIN — METOPROLOL TARTRATE 25 MG: 25 TABLET, FILM COATED ORAL at 13:31

## 2023-01-01 RX ADMIN — INSULIN GLARGINE 7 UNITS: 100 INJECTION, SOLUTION SUBCUTANEOUS at 08:16

## 2023-01-01 RX ADMIN — ACETAMINOPHEN 650 MG: 325 TABLET, FILM COATED ORAL at 17:39

## 2023-01-01 RX ADMIN — LIDOCAINE PATCH 4% 2 PATCH: 40 PATCH TOPICAL at 09:46

## 2023-01-01 RX ADMIN — ACETAMINOPHEN 975 MG: 325 TABLET, FILM COATED ORAL at 18:50

## 2023-01-01 RX ADMIN — LIDOCAINE PATCH 4% 2 PATCH: 40 PATCH TOPICAL at 10:14

## 2023-01-01 RX ADMIN — OLIVE OIL AND SOYBEAN OIL 250 ML: 16; 4 INJECTION, EMULSION INTRAVENOUS at 20:54

## 2023-01-01 RX ADMIN — GABAPENTIN 100 MG: 100 CAPSULE ORAL at 08:15

## 2023-01-01 RX ADMIN — SODIUM CHLORIDE: 9 INJECTION, SOLUTION INTRAVENOUS at 21:52

## 2023-01-01 RX ADMIN — POTASSIUM & SODIUM PHOSPHATES POWDER PACK 280-160-250 MG 1 PACKET: 280-160-250 PACK at 11:33

## 2023-01-01 RX ADMIN — OLIVE OIL AND SOYBEAN OIL 250 ML: 16; 4 INJECTION, EMULSION INTRAVENOUS at 21:55

## 2023-01-01 RX ADMIN — IOPAMIDOL 95 ML: 755 INJECTION, SOLUTION INTRAVENOUS at 16:53

## 2023-01-01 RX ADMIN — MORPHINE SULFATE 2 MG: 2 INJECTION, SOLUTION INTRAMUSCULAR; INTRAVENOUS at 07:35

## 2023-01-01 RX ADMIN — CALCIUM 500 MG: 500 TABLET ORAL at 08:11

## 2023-01-01 RX ADMIN — HYDROMORPHONE HYDROCHLORIDE 0.2 MG: 0.2 INJECTION, SOLUTION INTRAMUSCULAR; INTRAVENOUS; SUBCUTANEOUS at 03:06

## 2023-01-01 RX ADMIN — SODIUM CHLORIDE, POTASSIUM CHLORIDE, SODIUM LACTATE AND CALCIUM CHLORIDE: 600; 310; 30; 20 INJECTION, SOLUTION INTRAVENOUS at 16:40

## 2023-01-01 RX ADMIN — LIDOCAINE PATCH 4% 2 PATCH: 40 PATCH TOPICAL at 08:05

## 2023-01-01 RX ADMIN — HYDROCORTISONE SODIUM SUCCINATE 40 MG: 100 INJECTION, POWDER, FOR SOLUTION INTRAMUSCULAR; INTRAVENOUS at 09:35

## 2023-01-01 RX ADMIN — POTASSIUM CHLORIDE, DEXTROSE MONOHYDRATE AND SODIUM CHLORIDE: 150; 5; 450 INJECTION, SOLUTION INTRAVENOUS at 05:30

## 2023-01-01 RX ADMIN — SODIUM CHLORIDE 250 ML: 9 INJECTION, SOLUTION INTRAVENOUS at 14:13

## 2023-01-01 RX ADMIN — METOPROLOL TARTRATE 25 MG: 25 TABLET, FILM COATED ORAL at 08:41

## 2023-01-01 RX ADMIN — SODIUM CHLORIDE 2 UNITS: 9 INJECTION, SOLUTION INTRAVENOUS at 15:35

## 2023-01-01 ASSESSMENT — ENCOUNTER SYMPTOMS
HEMATURIA: 0
PALPITATIONS: 0
PARESTHESIAS: 0
DYSRHYTHMIAS: 1
JOINT SWELLING: 0
NERVOUS/ANXIOUS: 0
HEARTBURN: 0
DYSURIA: 0
CONSTIPATION: 0
ARTHRALGIAS: 1
CHILLS: 0
FEVER: 0
SHORTNESS OF BREATH: 1
FREQUENCY: 0
MYALGIAS: 0
ABDOMINAL PAIN: 0
DYSRHYTHMIAS: 1
DIARRHEA: 0
HEADACHES: 0
COUGH: 0
SORE THROAT: 0
EYE PAIN: 0
DIZZINESS: 1
HEMATOCHEZIA: 0
WEAKNESS: 0
NAUSEA: 0

## 2023-01-01 ASSESSMENT — ACTIVITIES OF DAILY LIVING (ADL)
ADLS_ACUITY_SCORE: 24
WEAR_GLASSES_OR_BLIND: YES
ADLS_ACUITY_SCORE: 32
ADLS_ACUITY_SCORE: 23
ADLS_ACUITY_SCORE: 25
ADLS_ACUITY_SCORE: 23
ADLS_ACUITY_SCORE: 24
ADLS_ACUITY_SCORE: 31
ADLS_ACUITY_SCORE: 35
ADLS_ACUITY_SCORE: 32
ADLS_ACUITY_SCORE: 33
ADLS_ACUITY_SCORE: 30
ADLS_ACUITY_SCORE: 24
ADLS_ACUITY_SCORE: 33
ADLS_ACUITY_SCORE: 24
ADLS_ACUITY_SCORE: 23
FALL_HISTORY_WITHIN_LAST_SIX_MONTHS: NO
ADLS_ACUITY_SCORE: 30
ADLS_ACUITY_SCORE: 24
ADLS_ACUITY_SCORE: 25
ADLS_ACUITY_SCORE: 24
ADLS_ACUITY_SCORE: 24
ADLS_ACUITY_SCORE: 25
VISION_MANAGEMENT: GLASSES
ADLS_ACUITY_SCORE: 26
ADLS_ACUITY_SCORE: 23
ADLS_ACUITY_SCORE: 32
ADLS_ACUITY_SCORE: 24
ADLS_ACUITY_SCORE: 24
ADLS_ACUITY_SCORE: 25
ADLS_ACUITY_SCORE: 24
ADLS_ACUITY_SCORE: 23
ADLS_ACUITY_SCORE: 30
ADLS_ACUITY_SCORE: 26
ADLS_ACUITY_SCORE: 25
ADLS_ACUITY_SCORE: 24
ADLS_ACUITY_SCORE: 24
ADLS_ACUITY_SCORE: 28
ADLS_ACUITY_SCORE: 26
ADLS_ACUITY_SCORE: 35
ADLS_ACUITY_SCORE: 33
ADLS_ACUITY_SCORE: 24
ADLS_ACUITY_SCORE: 24
ADLS_ACUITY_SCORE: 28
ADLS_ACUITY_SCORE: 24
ADLS_ACUITY_SCORE: 25
ADLS_ACUITY_SCORE: 26
ADLS_ACUITY_SCORE: 30
ADLS_ACUITY_SCORE: 31
ADLS_ACUITY_SCORE: 30
ADLS_ACUITY_SCORE: 30
ADLS_ACUITY_SCORE: 33
TRANSFERRING: 1-->ASSISTANCE (EQUIPMENT/PERSON) NEEDED
CURRENT_FUNCTION: NO ASSISTANCE NEEDED
ADLS_ACUITY_SCORE: 30
ADLS_ACUITY_SCORE: 25
ADLS_ACUITY_SCORE: 28
ADLS_ACUITY_SCORE: 24
ADLS_ACUITY_SCORE: 23
ADLS_ACUITY_SCORE: 24
ADLS_ACUITY_SCORE: 24
ADLS_ACUITY_SCORE: 31
ADLS_ACUITY_SCORE: 24
ADLS_ACUITY_SCORE: 36
ADLS_ACUITY_SCORE: 24
ADLS_ACUITY_SCORE: 26
ADLS_ACUITY_SCORE: 25
ADLS_ACUITY_SCORE: 25
ADLS_ACUITY_SCORE: 23
ADLS_ACUITY_SCORE: 25
CHANGE_IN_FUNCTIONAL_STATUS_SINCE_ONSET_OF_CURRENT_ILLNESS/INJURY: NO
ADLS_ACUITY_SCORE: 24
ADLS_ACUITY_SCORE: 25
ADLS_ACUITY_SCORE: 24
ADLS_ACUITY_SCORE: 24
ADLS_ACUITY_SCORE: 30
ADLS_ACUITY_SCORE: 24
ADLS_ACUITY_SCORE: 32
ADLS_ACUITY_SCORE: 24
DOING_ERRANDS_INDEPENDENTLY_DIFFICULTY: NO
ADLS_ACUITY_SCORE: 23
ADLS_ACUITY_SCORE: 32
ADLS_ACUITY_SCORE: 31
ADLS_ACUITY_SCORE: 24
ADLS_ACUITY_SCORE: 23
ADLS_ACUITY_SCORE: 24
ADLS_ACUITY_SCORE: 23
ADLS_ACUITY_SCORE: 23
ADLS_ACUITY_SCORE: 24
ADLS_ACUITY_SCORE: 26
ADLS_ACUITY_SCORE: 24
ADLS_ACUITY_SCORE: 35
ADLS_ACUITY_SCORE: 35
ADLS_ACUITY_SCORE: 28
ADLS_ACUITY_SCORE: 30
ADLS_ACUITY_SCORE: 26
ADLS_ACUITY_SCORE: 24
ADLS_ACUITY_SCORE: 23
ADLS_ACUITY_SCORE: 24
ADLS_ACUITY_SCORE: 24
ADLS_ACUITY_SCORE: 26
ADLS_ACUITY_SCORE: 24
ADLS_ACUITY_SCORE: 24
ADLS_ACUITY_SCORE: 34
ADLS_ACUITY_SCORE: 24
ADLS_ACUITY_SCORE: 35
ADLS_ACUITY_SCORE: 35
ADLS_ACUITY_SCORE: 33
ADLS_ACUITY_SCORE: 31
WALKING_OR_CLIMBING_STAIRS: AMBULATION DIFFICULTY, ASSISTANCE 1 PERSON
ADLS_ACUITY_SCORE: 34
ADLS_ACUITY_SCORE: 24
ADLS_ACUITY_SCORE: 26
ADLS_ACUITY_SCORE: 25
TRANSFERRING: 1-->ASSISTANCE (EQUIPMENT/PERSON) NEEDED (NOT DEVELOPMENTALLY APPROPRIATE)
ADLS_ACUITY_SCORE: 31
ADLS_ACUITY_SCORE: 24
ADLS_ACUITY_SCORE: 24
ADLS_ACUITY_SCORE: 23
ADLS_ACUITY_SCORE: 32
ADLS_ACUITY_SCORE: 24
ADLS_ACUITY_SCORE: 32
ADLS_ACUITY_SCORE: 24
ADLS_ACUITY_SCORE: 26
ADLS_ACUITY_SCORE: 24
ADLS_ACUITY_SCORE: 23
ADLS_ACUITY_SCORE: 23
ADLS_ACUITY_SCORE: 26
ADLS_ACUITY_SCORE: 24
ADLS_ACUITY_SCORE: 26
ADLS_ACUITY_SCORE: 24
ADLS_ACUITY_SCORE: 26
ADLS_ACUITY_SCORE: 35
ADLS_ACUITY_SCORE: 25
ADLS_ACUITY_SCORE: 24
ADLS_ACUITY_SCORE: 25
ADLS_ACUITY_SCORE: 24
ADLS_ACUITY_SCORE: 33
ADLS_ACUITY_SCORE: 28
ADLS_ACUITY_SCORE: 24
ADLS_ACUITY_SCORE: 24
ADLS_ACUITY_SCORE: 32
ADLS_ACUITY_SCORE: 23
ADLS_ACUITY_SCORE: 30
ADLS_ACUITY_SCORE: 24
ADLS_ACUITY_SCORE: 25
ADLS_ACUITY_SCORE: 25
ADLS_ACUITY_SCORE: 26
ADLS_ACUITY_SCORE: 34
ADLS_ACUITY_SCORE: 23
ADLS_ACUITY_SCORE: 31
ADLS_ACUITY_SCORE: 32
ADLS_ACUITY_SCORE: 24
ADLS_ACUITY_SCORE: 23
ADLS_ACUITY_SCORE: 25
ADLS_ACUITY_SCORE: 24
ADLS_ACUITY_SCORE: 31
ADLS_ACUITY_SCORE: 34
ADLS_ACUITY_SCORE: 32
ADLS_ACUITY_SCORE: 24
ADLS_ACUITY_SCORE: 24
ADLS_ACUITY_SCORE: 28
WALKING_OR_CLIMBING_STAIRS_DIFFICULTY: YES
ADLS_ACUITY_SCORE: 24
ADLS_ACUITY_SCORE: 25
ADLS_ACUITY_SCORE: 24
ADLS_ACUITY_SCORE: 30
ADLS_ACUITY_SCORE: 25
ADLS_ACUITY_SCORE: 23
ADLS_ACUITY_SCORE: 24
ADLS_ACUITY_SCORE: 24
ADLS_ACUITY_SCORE: 32
ADLS_ACUITY_SCORE: 24
ADLS_ACUITY_SCORE: 35
ADLS_ACUITY_SCORE: 30
CONCENTRATING,_REMEMBERING_OR_MAKING_DECISIONS_DIFFICULTY: YES
ADLS_ACUITY_SCORE: 30
ADLS_ACUITY_SCORE: 25
ADLS_ACUITY_SCORE: 24
ADLS_ACUITY_SCORE: 24
ADLS_ACUITY_SCORE: 30
ADLS_ACUITY_SCORE: 24
ADLS_ACUITY_SCORE: 26
ADLS_ACUITY_SCORE: 35
DRESSING/BATHING_DIFFICULTY: NO
ADLS_ACUITY_SCORE: 26
DIFFICULTY_EATING/SWALLOWING: NO
ADLS_ACUITY_SCORE: 24
ADLS_ACUITY_SCORE: 33
ADLS_ACUITY_SCORE: 30
ADLS_ACUITY_SCORE: 31
ADLS_ACUITY_SCORE: 32
ADLS_ACUITY_SCORE: 32
ADLS_ACUITY_SCORE: 24
TOILETING_ISSUES: NO
ADLS_ACUITY_SCORE: 30
ADLS_ACUITY_SCORE: 23
ADLS_ACUITY_SCORE: 23
ADLS_ACUITY_SCORE: 24
ADLS_ACUITY_SCORE: 32
ADLS_ACUITY_SCORE: 24
ADLS_ACUITY_SCORE: 24
ADLS_ACUITY_SCORE: 30

## 2023-01-01 ASSESSMENT — PAIN SCALES - GENERAL
PAINLEVEL: NO PAIN (0)
PAINLEVEL: MILD PAIN (2)
PAINLEVEL: NO PAIN (0)
PAINLEVEL: MILD PAIN (3)
PAINLEVEL: NO PAIN (0)

## 2023-01-01 ASSESSMENT — PATIENT HEALTH QUESTIONNAIRE - PHQ9
10. IF YOU CHECKED OFF ANY PROBLEMS, HOW DIFFICULT HAVE THESE PROBLEMS MADE IT FOR YOU TO DO YOUR WORK, TAKE CARE OF THINGS AT HOME, OR GET ALONG WITH OTHER PEOPLE: SOMEWHAT DIFFICULT
SUM OF ALL RESPONSES TO PHQ QUESTIONS 1-9: 8
SUM OF ALL RESPONSES TO PHQ QUESTIONS 1-9: 8

## 2023-01-06 ENCOUNTER — HOSPITAL ENCOUNTER (OUTPATIENT)
Dept: CARDIAC REHAB | Facility: CLINIC | Age: 83
Discharge: HOME | End: 2023-01-06
Attending: INTERNAL MEDICINE
Payer: COMMERCIAL

## 2023-01-06 PROCEDURE — 93798 PHYS/QHP OP CAR RHAB W/ECG: CPT | Performed by: OCCUPATIONAL THERAPIST

## 2023-01-08 DIAGNOSIS — I10 HYPERTENSION GOAL BP (BLOOD PRESSURE) < 140/90: ICD-10-CM

## 2023-01-11 ENCOUNTER — HOSPITAL ENCOUNTER (OUTPATIENT)
Dept: CARDIAC REHAB | Facility: CLINIC | Age: 83
Discharge: HOME | End: 2023-01-11
Attending: INTERNAL MEDICINE
Payer: COMMERCIAL

## 2023-01-11 PROCEDURE — 93798 PHYS/QHP OP CAR RHAB W/ECG: CPT | Performed by: REHABILITATION PRACTITIONER

## 2023-01-11 RX ORDER — AMLODIPINE BESYLATE 5 MG/1
7.5 TABLET ORAL DAILY
Qty: 135 TABLET | Refills: 3 | Status: SHIPPED | OUTPATIENT
Start: 2023-01-11 | End: 2023-01-01

## 2023-01-11 NOTE — TELEPHONE ENCOUNTER
Routing refill request to provider for review/approval because:  Labs out of range:  for creatinine - this is for amlodipine

## 2023-01-16 ENCOUNTER — HOSPITAL ENCOUNTER (OUTPATIENT)
Dept: CARDIAC REHAB | Facility: CLINIC | Age: 83
Discharge: HOME | End: 2023-01-16
Attending: INTERNAL MEDICINE
Payer: COMMERCIAL

## 2023-01-16 PROCEDURE — 93798 PHYS/QHP OP CAR RHAB W/ECG: CPT

## 2023-01-20 ENCOUNTER — HOSPITAL ENCOUNTER (OUTPATIENT)
Dept: CARDIAC REHAB | Facility: CLINIC | Age: 83
Discharge: HOME | End: 2023-01-20
Attending: INTERNAL MEDICINE
Payer: COMMERCIAL

## 2023-01-20 PROCEDURE — 93798 PHYS/QHP OP CAR RHAB W/ECG: CPT | Performed by: REHABILITATION PRACTITIONER

## 2023-01-23 ENCOUNTER — HOSPITAL ENCOUNTER (OUTPATIENT)
Dept: CARDIAC REHAB | Facility: CLINIC | Age: 83
Discharge: HOME | End: 2023-01-23
Attending: INTERNAL MEDICINE
Payer: COMMERCIAL

## 2023-01-23 PROCEDURE — 93798 PHYS/QHP OP CAR RHAB W/ECG: CPT | Performed by: REHABILITATION PRACTITIONER

## 2023-01-25 ENCOUNTER — HOSPITAL ENCOUNTER (OUTPATIENT)
Dept: CARDIAC REHAB | Facility: CLINIC | Age: 83
Discharge: HOME | End: 2023-01-25
Attending: INTERNAL MEDICINE
Payer: COMMERCIAL

## 2023-01-25 ENCOUNTER — TELEPHONE (OUTPATIENT)
Dept: CARDIOLOGY | Facility: CLINIC | Age: 83
End: 2023-01-25
Payer: COMMERCIAL

## 2023-01-25 PROCEDURE — 93798 PHYS/QHP OP CAR RHAB W/ECG: CPT | Performed by: REHABILITATION PRACTITIONER

## 2023-01-25 NOTE — TELEPHONE ENCOUNTER
Spoke with pt about refill on Plavix medication that was sent to us.   Pt had stents most recently at [place] and they have been managing the Plavix.   Pt would like his PCP to manage the Plavix because he is not planning to follow up with our cardiology clinic.  Pt currently had two months of Plavix at this time and will be seeing his PCP soon.   Pt aware to address it with them when he is seen.

## 2023-01-26 ENCOUNTER — TELEPHONE (OUTPATIENT)
Dept: CARDIOLOGY | Facility: CLINIC | Age: 83
End: 2023-01-26
Payer: COMMERCIAL

## 2023-01-26 NOTE — TELEPHONE ENCOUNTER
Returned a fax from [facility] requesting a refill of Clopidogrel. He has switched his care to Cardiology at the [facility]. Please send medication refills there.

## 2023-01-27 ENCOUNTER — TRANSFERRED RECORDS (OUTPATIENT)
Dept: HEALTH INFORMATION MANAGEMENT | Facility: CLINIC | Age: 83
End: 2023-01-27

## 2023-01-30 ENCOUNTER — HOSPITAL ENCOUNTER (OUTPATIENT)
Dept: CARDIAC REHAB | Facility: CLINIC | Age: 83
Discharge: HOME | End: 2023-01-30
Attending: INTERNAL MEDICINE
Payer: COMMERCIAL

## 2023-01-30 PROCEDURE — 93798 PHYS/QHP OP CAR RHAB W/ECG: CPT

## 2023-01-31 ENCOUNTER — OFFICE VISIT (OUTPATIENT)
Dept: OPTOMETRY | Facility: CLINIC | Age: 83
End: 2023-01-31
Payer: COMMERCIAL

## 2023-01-31 DIAGNOSIS — H52.4 PRESBYOPIA: ICD-10-CM

## 2023-01-31 DIAGNOSIS — H52.13 MYOPIA OF BOTH EYES WITH ASTIGMATISM: Primary | ICD-10-CM

## 2023-01-31 DIAGNOSIS — E11.9 DIABETES MELLITUS WITHOUT OPHTHALMIC MANIFESTATIONS (H): ICD-10-CM

## 2023-01-31 DIAGNOSIS — H26.9 BILATERAL INCIPIENT CATARACTS: ICD-10-CM

## 2023-01-31 DIAGNOSIS — H52.203 MYOPIA OF BOTH EYES WITH ASTIGMATISM: Primary | ICD-10-CM

## 2023-01-31 DIAGNOSIS — H35.3131 EARLY DRY STAGE NONEXUDATIVE AGE-RELATED MACULAR DEGENERATION OF BOTH EYES: ICD-10-CM

## 2023-01-31 DIAGNOSIS — H25.041 POSTERIOR SUBCAPSULAR AGE-RELATED CATARACT OF RIGHT EYE: ICD-10-CM

## 2023-01-31 PROCEDURE — 92014 COMPRE OPH EXAM EST PT 1/>: CPT | Performed by: OPTOMETRIST

## 2023-01-31 PROCEDURE — 92015 DETERMINE REFRACTIVE STATE: CPT | Performed by: OPTOMETRIST

## 2023-01-31 ASSESSMENT — VISUAL ACUITY
OD_CC+: -2
OD_SC: 20/200
OS_CC: 20/80
OS_SC: 20/200
OD_CC: 20/40
OS_CC: 20/20
OS_CC+: -1
METHOD: SNELLEN - LINEAR
CORRECTION_TYPE: GLASSES
OD_CC: 20/60

## 2023-01-31 ASSESSMENT — REFRACTION_MANIFEST
OD_CYLINDER: +0.75
OD_AXIS: 111
OS_AXIS: 143
OD_ADD: +2.50
OD_AXIS: 090
OS_SPHERE: -2.00
OD_SPHERE: -2.50
OS_AXIS: 110
METHOD_AUTOREFRACTION: 1
OS_SPHERE: +11.25
OS_CYLINDER: +5.75
OD_SPHERE: -3.50
OS_ADD: +2.50
OD_CYLINDER: +0.75
OS_CYLINDER: +1.00

## 2023-01-31 ASSESSMENT — REFRACTION_WEARINGRX
OD_SPHERE: -3.00
OD_CYLINDER: +2.00
OS_SPHERE: -2.50
OD_AXIS: 107
SPECS_TYPE: PAL
OS_ADD: +2.50
OD_ADD: +2.50
OS_AXIS: 126
OS_CYLINDER: +2.00

## 2023-01-31 ASSESSMENT — CONF VISUAL FIELD
OD_INFERIOR_TEMPORAL_RESTRICTION: 0
OS_NORMAL: 1
OS_SUPERIOR_TEMPORAL_RESTRICTION: 0
OD_SUPERIOR_TEMPORAL_RESTRICTION: 0
OD_SUPERIOR_NASAL_RESTRICTION: 0
OD_INFERIOR_NASAL_RESTRICTION: 0
OD_NORMAL: 1
OS_SUPERIOR_NASAL_RESTRICTION: 0
METHOD: COUNTING FINGERS
OS_INFERIOR_NASAL_RESTRICTION: 0
OS_INFERIOR_TEMPORAL_RESTRICTION: 0

## 2023-01-31 ASSESSMENT — EXTERNAL EXAM - LEFT EYE: OS_EXAM: NORMAL

## 2023-01-31 ASSESSMENT — SLIT LAMP EXAM - LIDS
COMMENTS: BLEPHARITIS, DERMATOCHALASIS
COMMENTS: BLEPHARITIS, DERMATOCHALASIS

## 2023-01-31 ASSESSMENT — TONOMETRY
OD_IOP_MMHG: 15
OS_IOP_MMHG: 15
IOP_METHOD: APPLANATION

## 2023-01-31 ASSESSMENT — CUP TO DISC RATIO
OS_RATIO: 0.3
OD_RATIO: 0.5

## 2023-01-31 ASSESSMENT — EXTERNAL EXAM - RIGHT EYE: OD_EXAM: NORMAL

## 2023-01-31 NOTE — PROGRESS NOTES
Chief Complaint   Patient presents with     Diabetic Eye Exam       Lab Results   Component Value Date    A1C 6.4 08/02/2022    A1C 6.1 06/22/2022    A1C 6.2 02/15/2022    A1C 7.0 07/30/2021    A1C 5.9 04/05/2021    A1C 6.7 01/15/2021    A1C 5.6 08/23/2020    A1C 5.5 07/14/2020    A1C 6.3 12/31/2019     Glasses from another place were horrible        Last Eye Exam: August 2022 - went to another place and was not happy with them   Dilated Previously: No, side effects of dilation explained today    What are you currently using to see?  glasses    Distance Vision Acuity: Noticed gradual change in both eyes    Near Vision Acuity: Not satisfied in glasses     Eye Comfort: good  Do you use eye drops? : No      Elizabeth Paez - Optometric Assistant      Medical, surgical and family histories reviewed and updated 1/31/  Just had heart surgery   OBJECTIVE: See Ophthalmology exam    ASSESSMENT:    ICD-10-CM    1. Myopia of both eyes with astigmatism  H52.13     H52.203       2. Diabetes mellitus without ophthalmic manifestations (H)  E11.9       3. Presbyopia  H52.4       4. Bilateral incipient cataracts  H26.9       5. Posterior subcapsular age-related cataract of right eye  H25.041       6. Early dry stage nonexudative age-related macular degeneration of both eyes  H35.3131         No subjective improvement  W/ MR   PLAN:  Hold prescription continue w/ current prescription    monitor cataract R return to clinic as needed sooner if problems   Refer when ready for CE   Austin Garza aware  eye exam results will be sent to Vanessa Munson OD

## 2023-01-31 NOTE — LETTER
1/31/2023         RE: Austin CHEEMA Greg  [address] [place] [address]        Dear Colleague,    Thank you for referring your patient, Austin Garza, to the [facility] [place]. Please see a copy of my visit note below.    Chief Complaint   Patient presents with     Diabetic Eye Exam       Lab Results   Component Value Date    A1C 6.4 08/02/2022    A1C 6.1 06/22/2022    A1C 6.2 02/15/2022    A1C 7.0 07/30/2021    A1C 5.9 04/05/2021    A1C 6.7 01/15/2021    A1C 5.6 08/23/2020    A1C 5.5 07/14/2020    A1C 6.3 12/31/2019     Glasses from another place were horrible        Last Eye Exam: August 2022 - went to another place and was not happy with them   Dilated Previously: No, side effects of dilation explained today    What are you currently using to see?  glasses    Distance Vision Acuity: Noticed gradual change in both eyes    Near Vision Acuity: Not satisfied in glasses     Eye Comfort: good  Do you use eye drops? : No      Elizabeth Paez - Optometric Assistant      Medical, surgical and family histories reviewed and updated 1/31/  Just had heart surgery   OBJECTIVE: See Ophthalmology exam    ASSESSMENT:    ICD-10-CM    1. Myopia of both eyes with astigmatism  H52.13     H52.203       2. Diabetes mellitus without ophthalmic manifestations (H)  E11.9       3. Presbyopia  H52.4       4. Bilateral incipient cataracts  H26.9       5. Posterior subcapsular age-related cataract of right eye  H25.041       6. Early dry stage nonexudative age-related macular degeneration of both eyes  H35.3131         No subjective improvement  W/ MR   PLAN:  Hold prescription continue w/ current prescription    monitor cataract R return to clinic as needed sooner if problems   Refer when ready for CE   Austin CHEEMA Greg aware  eye exam results will be sent to Vanessa Munson OD         Again, thank you for allowing me to participate in the care of your patient.         Sincerely,        Cristina Allen, OD

## 2023-01-31 NOTE — PATIENT INSTRUCTIONS
No glasses change, monitor cataract in R eye   Early macular degeneration in both eyes that is pretty equal despite L eye much clearer ( due to cataract)

## 2023-02-01 ENCOUNTER — VIRTUAL VISIT (OUTPATIENT)
Dept: PHARMACY | Facility: CLINIC | Age: 83
End: 2023-02-01
Payer: COMMERCIAL

## 2023-02-01 DIAGNOSIS — Z98.890 STATUS POST CORONARY ANGIOGRAM: ICD-10-CM

## 2023-02-01 DIAGNOSIS — Z79.4 TYPE 2 DIABETES MELLITUS WITH OTHER SPECIFIED COMPLICATION, WITH LONG-TERM CURRENT USE OF INSULIN (H): Primary | ICD-10-CM

## 2023-02-01 DIAGNOSIS — I71.40 ABDOMINAL AORTIC ANEURYSM (AAA) WITHOUT RUPTURE, UNSPECIFIED PART (H): ICD-10-CM

## 2023-02-01 DIAGNOSIS — E11.69 TYPE 2 DIABETES MELLITUS WITH OTHER SPECIFIED COMPLICATION, WITH LONG-TERM CURRENT USE OF INSULIN (H): Primary | ICD-10-CM

## 2023-02-01 DIAGNOSIS — I48.0 PAROXYSMAL ATRIAL FIBRILLATION (H): ICD-10-CM

## 2023-02-01 PROCEDURE — 99605 MTMS BY PHARM NP 15 MIN: CPT | Performed by: PHARMACIST

## 2023-02-01 PROCEDURE — 99607 MTMS BY PHARM ADDL 15 MIN: CPT | Performed by: PHARMACIST

## 2023-02-01 RX ORDER — TRIAMCINOLONE ACETONIDE 1 MG/G
OINTMENT TOPICAL
COMMUNITY
Start: 2022-08-12 | End: 2023-01-01

## 2023-02-01 NOTE — PROGRESS NOTES
Medication Therapy Management (MTM) Encounter    ASSESSMENT:                            Medication Adherence/Access: No issues identified    Type 2 Diabetes: Patient is meeting A1c goal of < 8%. No changes. Recommend a1c recheck in ~ May.     CAD s/p PCI/pAfib/AAA s/p EVAR (2017):  Blood pressure at goal < [phone] mm Hg (given age reasonable goal). LDL < 55 mg/dL.     PLAN:                            Refilled medication    No changes    Follow-up: 6 month (outreach when it gets closer, per patient preference)    SUBJECTIVE/OBJECTIVE:                          Austin Garza is a 82 year old male called for a follow-up visit.  Today's visit is a follow-up MTM visit from 8/2/2022.     Reason for visit: patient is going well besides wondering about refill of clopidogrel..    Allergies/ADRs: Reviewed in chart  Past Medical History: Reviewed in chart  Tobacco: He reports that he quit smoking about 48 years ago. His smoking use included cigarettes. He has a 10.00 pack-year smoking history. He has quit using smokeless tobacco.  Alcohol: not currently using regularly    Medication Adherence/Access: No issues reported. Managing his medications on his own. Fills at [facility] [facility] [address] ([place]).       Type 2 Diabetes: Currently taking Trulicity 1.5 mg weekly.   Side effects: None.  History of reflux issues, no complaints today.  Blood sugar readings: not checking regularly anymore, burdensome. A1c from [place] on 11/10/2022 was 6.1%.  Lab Results   Component Value Date    A1C 6.4 08/02/2022    A1C 6.1 06/22/2022    A1C 6.2 02/15/2022    A1C 7.0 07/30/2021     Lab Results   Component Value Date    UMALCR 64.29 (H) 02/15/2022       CAD s/p PCI/pAfib/AAA s/p EVAR (2017):  He has intermittent light headed sensation when getting out of bed. No falls. He usually sits on the edge of the bed for a while first. Holding DOAC (history of Eliquis 5mg twice daily) due to concern for bleed (history of hematuria, thrombocytopenia and  rectal bleed), but AFib [id number] > 2. He was recommend to start DAPT, coronary angiogram 11/16/2022 per cardiology at [facility]. Last office visit with [facility] was 1/9/2023.  Cardiac rehab twice a week going on the treadmill and bicycle. Scheduled through mid-April.   Medications:  - metoprolol  mg daily   - rosuvastatin 20 mg daily   - clopidogrel 75 mg daily for AAA   - amlodipine 7.5 mg daily   - aspirin EC 81 mg daily   - NTG as needed - not used  BP Readings from Last 3 Encounters:   11/28/22 138/68   10/14/22 126/78   09/23/22 123/76     Hemoglobin   Date Value Ref Range Status   11/28/2022 12.5 (L) 13.3 - 17.7 g/dL Final   06/01/2021 13.5 13.3 - 17.7 g/dL Final     WBC   Date Value Ref Range Status   06/01/2021 12.7 (H) 4.0 - 11.0 10e9/L Final     WBC Count   Date Value Ref Range Status   11/28/2022 8.9 4.0 - 11.0 10e3/uL Final     Platelet Count   Date Value Ref Range Status   11/28/2022 89 (L) 150 - 450 10e3/uL Final   06/01/2021 119 (L) 150 - 450 10e9/L Final     Recent Labs   Lab Test 02/15/22  0734 04/05/21  0913 11/17/16  0731 10/23/15  0903 08/05/14  0755   CHOL 120 166   < > 109 131   HDL 33* 57   < > 43 47   LDL 58 96   < > 54 63   TRIG 147 64   < > 62 105   CHOLHDLRATIO  --   --   --  2.5 2.8    < > = values in this interval not displayed.       Today's Vitals: There were no vitals taken for this visit.  ----------------      I spent 25 minutes with this patient today. All changes were made via collaborative practice agreement with Vanessa Munson MD. A copy of the visit note was provided to the patient's provider(s).    A summary of these recommendations was sent via Snapt.    Alayna RobertoD  Medication Therapy Management Pharmacist    Telemedicine Visit Details  Type of service:  Telephone visit  Start Time: 11:32am  End Time: 11:57am     Medication Therapy Recommendations  No medication therapy recommendations to display

## 2023-02-01 NOTE — LETTER
"Recommended To-Do List      Prepared on: Feb 1, 2023       You can get the best results from your medications by completing the items on this \"To-Do List.\"      Bring your To-Do List when you go to your doctor. And, share it with your family or caregivers.    My To-Do List:  What we talked about: What I should do:    What my medicines are for, how to know if my medicines are working, made sure my medicines are safe for me and reviewed how to take my medicines.      Take my medicines every day                  "

## 2023-02-01 NOTE — LETTER
"_  Medication List        Prepared on: Feb 1, 2023     Bring your Medication List when you go to the doctor, hospital, or   emergency room. And, share it with your family or caregivers.     Note any changes to how you take your medications.  Cross out medications when you no longer use them.    Medication How I take it Why I use it Prescriber   amLODIPine (NORVASC) 5 MG tablet Take 1.5 tablets (7.5 mg) by mouth daily Hypertension Goal BP (Blood Pressure) < 140/90 Oral Fisher PA-C   aspirin (ASA) 81 MG EC tablet Take 81 mg by mouth daily Clot prevention Patient Reported   blood glucose (CONTOUR NEXT TEST) test strip Use to test blood sugar 3-4 times daily or as directed.  Fabrice Melgoza MD   blood glucose monitoring (NO BRAND SPECIFIED) meter device kit Use to test blood sugar 2 times daily or as directed. Type 2 diabetes mellitus with hypoglycemia without coma, without long-term current use of insulin (H) Vanessa Munson MD   clopidogrel (PLAVIX) 75 MG tablet Take 1 tablet (75 mg) by mouth daily Abdominal aortic aneurysm (AAA) without rupture (H) Vanessa Munson MD   dulaglutide (TRULICITY) 1.5 MG/0.5ML pen Inject 1.5 mg Subcutaneous every 7 days Type 2 diabetes mellitus with other specified complication, with long-term current use of insulin (H) Vanessa Munson MD   metoprolol succinate ER (TOPROL XL) 100 MG 24 hr tablet Take 1 tablet (100 mg) by mouth daily Coronary artery disease Vanessa Munson MD   nitroGLYcerin (NITROSTAT) 0.4 MG sublingual tablet One tablet under the tongue every 5 minutes if needed for chest pain. May repeat every 5 minutes for a maximum of 3 doses in 15 minutes\" Coronary artery disease involving native coronary artery of native heart without angina pectoris Evaristo Beaulieu MD   rosuvastatin (CRESTOR) 20 MG tablet Take 1 tablet (20 mg) by mouth daily Cholesterol and coronary artery disease Vanessa Munson MD   triamcinolone (KENALOG) 0.1 % external ointment 1 APPLICATION " TWICE A DAY TOPICALLY TO ITCHY AREAS ON BACK FOR 2 WEEKS. REPEAT WITH FLARES. Skin irritation Patient Reported         Add new medications, over-the-counter drugs, herbals, vitamins, or  minerals in the blank rows below.    Medication How I take it Why I use it Prescriber                          Allergies:      ace inhibitors        Side effects I have had:               Other Information:              My notes and questions:

## 2023-02-01 NOTE — LETTER
February 8, 2023  Austin Garza  [address] [place] [address]    Dear OCTAVIANO Flores [facility] [place]     Thank you for talking with me on Feb 1, 2023 about your health and medications. As a follow-up to our conversation, I have included two documents:      1. Your Recommended To-Do List has steps you should take to get the best results from your medications.  2. Your Medication List will help you keep track of your medications and how to take them.    If you want to talk about these documents, please call Klaudia Santa RPH at phone: [phone], Monday-Friday 8-4:30pm.    I look forward to working with you and your doctors to make sure your medications work well for you.    Sincerely,  Klaudia Santa RPH  [facility] Pharmacist, [facility]

## 2023-02-06 ENCOUNTER — HOSPITAL ENCOUNTER (OUTPATIENT)
Dept: CARDIAC REHAB | Facility: CLINIC | Age: 83
Discharge: HOME | End: 2023-02-06
Attending: INTERNAL MEDICINE
Payer: COMMERCIAL

## 2023-02-06 PROCEDURE — 93798 PHYS/QHP OP CAR RHAB W/ECG: CPT

## 2023-02-08 RX ORDER — METOPROLOL SUCCINATE 100 MG/1
100 TABLET, EXTENDED RELEASE ORAL DAILY
Qty: 90 TABLET | Refills: 0 | Status: SHIPPED | OUTPATIENT
Start: 2023-02-08 | End: 2023-01-01

## 2023-02-08 RX ORDER — CLOPIDOGREL BISULFATE 75 MG/1
75 TABLET ORAL DAILY
Qty: 90 TABLET | Refills: 0 | Status: SHIPPED | OUTPATIENT
Start: 2023-02-08 | End: 2023-01-01

## 2023-02-08 NOTE — PATIENT INSTRUCTIONS
"Recommendations from today's MTM visit:                                                      No changes, medication refilled.    Follow-up: Return in about 6 months (around 8/1/2023) for Medication Therapy Telephone Visit.    It was great speaking with you today.  I value your experience and would be very thankful for your time in providing feedback in our clinic survey. In the next few days, you may receive an email or text message from [facility] with a link to a survey related to your  clinical pharmacist.\"     To schedule another MTM appointment, please call the clinic directly or you may call the MTM scheduling line at [phone] or toll-free at [phone].     My Clinical Pharmacist's contact information:                                                      Please feel free to contact me with any questions or concerns you have.      Klaudia Santa, PharmD  Medication Therapy Management Pharmacist     "

## 2023-02-13 ENCOUNTER — TRANSFERRED RECORDS (OUTPATIENT)
Dept: HEALTH INFORMATION MANAGEMENT | Facility: CLINIC | Age: 83
End: 2023-02-13

## 2023-02-14 ENCOUNTER — LAB (OUTPATIENT)
Dept: ONCOLOGY | Facility: CLINIC | Age: 83
End: 2023-02-14
Attending: INTERNAL MEDICINE
Payer: COMMERCIAL

## 2023-02-14 DIAGNOSIS — C91.10 CLL (CHRONIC LYMPHOCYTIC LEUKEMIA) (H): ICD-10-CM

## 2023-02-14 LAB
ALBUMIN SERPL BCG-MCNC: 4.1 G/DL (ref 3.5–5.2)
ALP SERPL-CCNC: 64 U/L (ref 40–129)
ALT SERPL W P-5'-P-CCNC: 25 U/L (ref 10–50)
ANION GAP SERPL CALCULATED.3IONS-SCNC: 11 MMOL/L (ref 7–15)
AST SERPL W P-5'-P-CCNC: 22 U/L (ref 10–50)
BASOPHILS # BLD AUTO: 0 10E3/UL (ref 0–0.2)
BASOPHILS NFR BLD AUTO: 0 %
BILIRUB SERPL-MCNC: 0.5 MG/DL
BUN SERPL-MCNC: 17.4 MG/DL (ref 8–23)
CALCIUM SERPL-MCNC: 9 MG/DL (ref 8.8–10.2)
CHLORIDE SERPL-SCNC: 101 MMOL/L (ref 98–107)
CREAT SERPL-MCNC: 1.4 MG/DL (ref 0.67–1.17)
DEPRECATED HCO3 PLAS-SCNC: 25 MMOL/L (ref 22–29)
EOSINOPHIL # BLD AUTO: 0.1 10E3/UL (ref 0–0.7)
EOSINOPHIL NFR BLD AUTO: 1 %
ERYTHROCYTE [DISTWIDTH] IN BLOOD BY AUTOMATED COUNT: 15.1 % (ref 10–15)
GFR SERPL CREATININE-BSD FRML MDRD: 51 ML/MIN/1.73M2
GLUCOSE SERPL-MCNC: 243 MG/DL (ref 70–99)
HCT VFR BLD AUTO: 34 % (ref 40–53)
HGB BLD-MCNC: 11.3 G/DL (ref 13.3–17.7)
IMM GRANULOCYTES # BLD: 0 10E3/UL
IMM GRANULOCYTES NFR BLD: 0 %
LDH SERPL L TO P-CCNC: 176 U/L (ref 0–250)
LYMPHOCYTES # BLD AUTO: 4 10E3/UL (ref 0.8–5.3)
LYMPHOCYTES NFR BLD AUTO: 54 %
MCH RBC QN AUTO: 30.8 PG (ref 26.5–33)
MCHC RBC AUTO-ENTMCNC: 33.2 G/DL (ref 31.5–36.5)
MCV RBC AUTO: 93 FL (ref 78–100)
MONOCYTES # BLD AUTO: 0.3 10E3/UL (ref 0–1.3)
MONOCYTES NFR BLD AUTO: 3 %
NEUTROPHILS # BLD AUTO: 3.2 10E3/UL (ref 1.6–8.3)
NEUTROPHILS NFR BLD AUTO: 42 %
NRBC # BLD AUTO: 0 10E3/UL
NRBC BLD AUTO-RTO: 0 /100
PLATELET # BLD AUTO: 78 10E3/UL (ref 150–450)
POTASSIUM SERPL-SCNC: 4.5 MMOL/L (ref 3.4–5.3)
PROT SERPL-MCNC: 6.5 G/DL (ref 6.4–8.3)
RBC # BLD AUTO: 3.67 10E6/UL (ref 4.4–5.9)
SODIUM SERPL-SCNC: 137 MMOL/L (ref 136–145)
WBC # BLD AUTO: 7.6 10E3/UL (ref 4–11)

## 2023-02-14 PROCEDURE — 85014 HEMATOCRIT: CPT | Performed by: INTERNAL MEDICINE

## 2023-02-14 PROCEDURE — 36415 COLL VENOUS BLD VENIPUNCTURE: CPT

## 2023-02-14 PROCEDURE — 83615 LACTATE (LD) (LDH) ENZYME: CPT | Performed by: INTERNAL MEDICINE

## 2023-02-14 PROCEDURE — 80053 COMPREHEN METABOLIC PANEL: CPT | Performed by: INTERNAL MEDICINE

## 2023-02-16 ENCOUNTER — ONCOLOGY VISIT (OUTPATIENT)
Dept: ONCOLOGY | Facility: CLINIC | Age: 83
End: 2023-02-16
Attending: INTERNAL MEDICINE
Payer: COMMERCIAL

## 2023-02-16 VITALS
HEIGHT: 69 IN | RESPIRATION RATE: 18 BRPM | OXYGEN SATURATION: 99 % | SYSTOLIC BLOOD PRESSURE: 142 MMHG | WEIGHT: 208 LBS | HEART RATE: 78 BPM | DIASTOLIC BLOOD PRESSURE: 86 MMHG | TEMPERATURE: 98 F | BODY MASS INDEX: 30.81 KG/M2

## 2023-02-16 DIAGNOSIS — D69.6 THROMBOCYTOPENIA (H): ICD-10-CM

## 2023-02-16 DIAGNOSIS — C91.10 CLL (CHRONIC LYMPHOCYTIC LEUKEMIA) (H): Primary | ICD-10-CM

## 2023-02-16 DIAGNOSIS — C91.10 CHRONIC LYMPHOCYTIC LEUKEMIA OF B-CELL TYPE NOT HAVING ACHIEVED REMISSION (H): ICD-10-CM

## 2023-02-16 DIAGNOSIS — S00.83XA FACIAL HEMATOMA, INITIAL ENCOUNTER: ICD-10-CM

## 2023-02-16 PROCEDURE — G0463 HOSPITAL OUTPT CLINIC VISIT: HCPCS | Performed by: INTERNAL MEDICINE

## 2023-02-16 PROCEDURE — 99214 OFFICE O/P EST MOD 30 MIN: CPT | Performed by: INTERNAL MEDICINE

## 2023-02-16 RX ORDER — HEPARIN SODIUM,PORCINE 10 UNIT/ML
5 VIAL (ML) INTRAVENOUS
Status: CANCELLED | OUTPATIENT
Start: 2023-02-17

## 2023-02-16 RX ORDER — HEPARIN SODIUM (PORCINE) LOCK FLUSH IV SOLN 100 UNIT/ML 100 UNIT/ML
5 SOLUTION INTRAVENOUS
Status: CANCELLED | OUTPATIENT
Start: 2023-02-17

## 2023-02-16 ASSESSMENT — PAIN SCALES - GENERAL: PAINLEVEL: MILD PAIN (2)

## 2023-02-16 NOTE — PROGRESS NOTES
[facility]    Hematology/Oncology Established Patient Note      Today's Date: 2/16/23    Reason for Follow-up: CLL      HISTORY OF PRESENT ILLNESS: Austin Garza is an 82 year old male with CLL previously on ibrutinib and followed by Dr. Perry who has since relocated.     He has PMHx of DMII, HTN who presented with leukocytosis.  In November 2017, he was found to have elevated WBC of 61.7K, hemoglobin of 10.4, and platelet of 115K.  There were no other CBC results available between 2010 and 2017.  Prior to 2010, he had normal CBC, with normal to mild anemia, and mild thrombocytopenia.   He was asymptomatic.    He underwent bone marrow biopsy on 11/21/17, which was consistent with chronic lymphocytic leukemia/small lymphocytic lymphoma, with 13% ringed sideroblasts identified.  The presence of increased numbers of ringed sideroblasts raises the possibility of an associated myelodysplastic syndrome.  Dysplastic changes are not identified though.  Many cases exhibiting ringed sideroblasts are metabolic origin, without significant risk of progression to acute leukemia, and these typically do not show dysplastic morphology as is the case with this specimen.  15% ringed sideroblasts are required for a diagnosis for RARS, which this specimen does not meet.  Flow cytometry is also consistent with CLL/SLL.  Cytogenetics show two (10%) of the metaphase cells comprise a clone characterized by a deletion within the proximal long arm of a chromosome 13 as the sole karyotypic abnormality.  Three (15%) metaphases had loss of the Y chromosome as the sole abnormality.    CT scan on 11/29/17 shows mildly enlarged left axillary lymph node and periaortic lymph nodes in the retroperitoneum of the abdomen.  Spleen is also enlarged.    On his staging CT scan for CLL, he was incidentally found to have a large infrarenal abdominal aortic aneurysm, measuring 7.6 cm.  He was seen by Dr. Thomas, and he underwent EVAR  on 12/4/17.     He started ibrutinib on 5/4/20.  It was held 5/28/20-6/4/20 for tooth extraction.    He was hospitalized on 7/30/2021 for shortness of breath, new CHF, A. fib with RVR and ibrutinib was put on hold.  He was seen by cardiology and eventually underwent cardioversion for the A. fib.  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  He was started on Eliquis and metoprolol.  His Plavix was stopped.    He went to the [facility] ED on 9/26/21 and found to have pericardial effusion.  Due to lack of beds, he was transferred to [facility] for acute pericardial effusion and pericarditis.  He was started on colchicine and prednisone taper x 6 weeks.  He was also given Bactrim for PJP prophylaxis due duration of prednisone course.  He was discharged on 10/1/21.      Since his last visit in clinic, he states he has had multiple hospitalizations for back pain. He informs me he has cardiology follow up as he was discovered to have coronary artery disease. He has had angiogram. There are recommendations in regards to CABG vs PCI. He states he is uncertain if he will move forward with this. He will be seen in 11/3/22.      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 He had bleed with this and stitches were again placed. He has ecchymosis of the lip, chin, and neck. He feels swelling into the face. He is to see dermatology after this visit.       REVIEW OF SYSTEMS:   14 point ROS was reviewed and is negative other than as noted above in HPI.       HOME MEDICATIONS:  Current Outpatient Medications   Medication Sig Dispense Refill     amLODIPine (NORVASC) 5 MG tablet Take 1.5 tablets (7.5 mg) by mouth daily 135 tablet 3     aspirin (ASA) 81 MG EC tablet Take 81 mg by mouth daily       blood glucose (CONTOUR NEXT TEST) test strip Use to test blood sugar 3-4 times daily or as directed. 100 strip 6     blood glucose monitoring (NO BRAND  "SPECIFIED) meter device kit Use to test blood sugar 2 times daily or as directed. 1 kit 0     clopidogrel (PLAVIX) 75 MG tablet Take 1 tablet (75 mg) by mouth daily 90 tablet 0     dulaglutide (TRULICITY) 1.5 MG/0.5ML pen Inject 1.5 mg Subcutaneous every 7 days 6 mL 3     metoprolol succinate ER (TOPROL XL) 100 MG 24 hr tablet Take 1 tablet (100 mg) by mouth daily 90 tablet 0     nitroGLYcerin (NITROSTAT) 0.4 MG sublingual tablet One tablet under the tongue every 5 minutes if needed for chest pain. May repeat every 5 minutes for a maximum of 3 doses in 15 minutes\" 25 tablet 3     rosuvastatin (CRESTOR) 20 MG tablet Take 1 tablet (20 mg) by mouth daily 90 tablet 3     triamcinolone (KENALOG) 0.1 % external ointment 1 APPLICATION TWICE A DAY TOPICALLY TO ITCHY AREAS ON BACK FOR 2 WEEKS. REPEAT WITH FLARES.           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      CLL (chronic lymphocytic leukemia)      Diaphragmatic hernia      Diverticulitis of colon      Esophageal reflux      Essential hypertension      Hyperlipidemia      Irritable bowel syndrome      Macular degeneration (senile) of retina      Mumps      Squamous Cell Carcinoma, Back 1988     Type 2 diabetes mellitus          PAST SURGICAL HISTORY:  Past Surgical History:   Procedure Laterality Date     ANESTHESIA CARDIOVERSION N/A 8/2/2021    Procedure: ANESTHESIA, FOR CARDIOVERSION;  Surgeon: GENERIC ANESTHESIA PROVIDER;  Location: [facility] OR     APPENDECTOMY OPEN  1966     BONE MARROW BIOPSY, BONE SPECIMEN, NEEDLE/TROCAR N/A 11/21/2017    Procedure: BIOPSY BONE MARROW;  BONE MARROW BIOPSY;  Surgeon: Shady Garcia MD;  Location:  GI     COLONOSCOPY  2002     CV CORONARY ANGIOGRAM N/A 6/29/2022    Procedure: Coronary Angiogram;  Surgeon: Evaristo Beaulieu MD;  " Location:  HEART CARDIAC CATH LAB     ENDOVASCULAR REPAIR ANEURYSM ABDOMINAL AORTA N/A 2017    Procedure: ENDOVASCULAR REPAIR ANEURYSM ABDOMINAL AORTA;  ENDOVASCULAR REPAIR ANEURYSM ABDOMINAL AORTA;  Surgeon: Milton Cazares MD;  Location: [facility] OR     Fistulotomy with marsupialization for repair of fisture in ano       IR ABDOMINAL AORTOGRAM  3/11/2019     IR VISCERAL EMBOLIZATION  2019     Multiple skin tags - resection  1998     Squamous cell skin cancer resection - back       VASECTOMY           SOCIAL HISTORY:  Social History     Socioeconomic History     Marital status:      Spouse name: librado     Number of children: 0     Years of education: 17     Highest education level: Not on file   Occupational History     Occupation: retired   Tobacco Use     Smoking status: Former     Packs/day: 0.50     Years: 20.00     Pack years: 10.00     Types: Cigarettes     Quit date: 1975     Years since quittin.1     Smokeless tobacco: Former   Substance and Sexual Activity     Alcohol use: Yes     Alcohol/week: 10.0 - 14.0 standard drinks     Types: 10 - 14 Standard drinks or equivalent per week     Comment: 3 drinks a day/wine     Drug use: No     Sexual activity: Never   Other Topics Concern     Parent/sibling w/ CABG, MI or angioplasty before 65F 55M? Not Asked   Social History Narrative     Not on file     Social Determinants of Health     Financial Resource Strain: Not on file   Food Insecurity: Not on file   Transportation Needs: Not on file   Physical Activity: Not on file   Stress: Not on file   Social Connections: Not on file   Intimate Partner Violence: Not At Risk     Fear of Current or Ex-Partner: No     Emotionally Abused: No     Physically Abused: No     Sexually Abused: No   Housing Stability: Not on file   He quit smoking in .  He drinks 1-3 glasses of wine a night with dinner.  He is retired, and used to work as a .  He lives with his wife in [place].  His  "cousin had lung cancer and  around age 69.  Otherwise, he denies family history of cancer.        FAMILY HISTORY:  Family History   Problem Relation Age of Onset     Cerebrovascular Disease Father         x 2     Hypertension Mother      C.A.D. Mother      Cancer Mother         skin     Eye Disorder Mother         glaucoma     Prostate Cancer No family hx of      Cancer - colorectal No family hx of      Glaucoma No family hx of      Macular Degeneration No family hx of          PHYSICAL EXAM:  BP (!) 142/86 (Cuff Size: Adult Large)   Pulse 78   Temp 98  F (36.7  C) (Tympanic)   Resp 18   Ht 1.753 m (5' 9\")   Wt 94.3 kg (208 lb)   SpO2 99%   BMI 30.72 kg/m    ECO  HEENT: Large bandage of the upper lip. He has ecchymosis of the oral labial fold into chin and upper cervical area. He has tenderness of the sinus.   GENERAL/CONSTITUTIONAL: No acute distress.  EYES: No scleral icterus.  NEUROLOGIC: Alert, oriented, answers questions appropriately.  INTEGUMENTARY: No jaundice.    GAIT: Steady, does not use assistive device      LABS:   Latest Reference Range & Units 23 09:25   Sodium 136 - 145 mmol/L 137   Potassium 3.4 - 5.3 mmol/L 4.5   Chloride 98 - 107 mmol/L 101   Carbon Dioxide (CO2) 22 - 29 mmol/L 25   Urea Nitrogen 8.0 - 23.0 mg/dL 17.4   Creatinine 0.67 - 1.17 mg/dL 1.40 (H)   GFR Estimate >60 mL/min/1.73m2 51 (L)   Calcium 8.8 - 10.2 mg/dL 9.0   Anion Gap 7 - 15 mmol/L 11   Albumin 3.5 - 5.2 g/dL 4.1   Protein Total 6.4 - 8.3 g/dL 6.5   Alkaline Phosphatase 40 - 129 U/L 64   ALT 10 - 50 U/L 25   AST 10 - 50 U/L 22   Bilirubin Total <=1.2 mg/dL 0.5   Glucose 70 - 99 mg/dL 243 (H)   Lactate Dehydrogenase 0 - 250 U/L 176   WBC 4.0 - 11.0 10e3/uL 7.6   Hemoglobin 13.3 - 17.7 g/dL 11.3 (L)   Hematocrit 40.0 - 53.0 % 34.0 (L)   Platelet Count 150 - 450 10e3/uL 78 (L)   RBC Count 4.40 - 5.90 10e6/uL 3.67 (L)   MCV 78 - 100 fL 93   MCH 26.5 - 33.0 pg 30.8   MCHC 31.5 - 36.5 g/dL 33.2   RDW 10.0 - 15.0 " % 15.1 (H)   % Neutrophils % 42   % Lymphocytes % 54   % Monocytes % 3   % Eosinophils % 1   % Basophils % 0   Absolute Basophils 0.0 - 0.2 10e3/uL 0.0   Absolute Eosinophils 0.0 - 0.7 10e3/uL 0.1   Absolute Immature Granulocytes <=0.4 10e3/uL 0.0   Absolute Lymphocytes 0.8 - 5.3 10e3/uL 4.0   Absolute Monocytes 0.0 - 1.3 10e3/uL 0.3   % Immature Granulocytes % 0   Absolute Neutrophils 1.6 - 8.3 10e3/uL 3.2   Absolute NRBCs 10e3/uL 0.0   NRBCs per 100 WBC <1 /100 0       ASSESSMENT/PLAN:  Austin Garza is an 82 year old male with:    1) CLL/SLL: BLACKWOOD stage III, with lymphocytosis, lymphadenopathy, splenomegaly, and anemia.  He has mild thrombocytopenia as well, but is above 100K.  He presented with leukocytosis with WBC of 61.7K, hemoglobin of 10.4, and platelet count of 115K on diagnosis.  Bone marrow biopsy and flow cytometry confirmed CLL/SLL.  CT scan shows mildly enlarged left axillary lymph node and periaortic lymph nodes in the retroperitoneum of the abdomen, with enlarged spleen.      Due to worsening lymphocyte count, anemia, thrombocytopenia, as well as fatigue and night sweats, he started ibrutinib on 5/4/20.      His WBC normalized, with stable hemoglobin and platelet count.    In light of his recent issues with atrial fibrillation and now on Eliquis, his ibrutinib has been on hold since August 2021.  Considering his CLL is under good control, his WBC is actually on the lower end of normal, and with risk of atrial fibrillation and bleeding on anticoagulants, we will continue to hold ibrutinib for now and monitor his counts. He has persistent thrombocytopenia. He will likely need to resume treatment at some point, but we can follow the counts and we could also consider other treatments as well for CLL.      -Continue to hold ibrutinib.  -He has not had frequent or recent infections.  -CBC stable.    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 He completed radiation at [facility] with planned 60 Gy x 30 fractions. He has completed radiation and noted that he did not need any more follow-up for this.    3) Abdominal aortic aneurysm: measures up to 7.6 cm on CT scan, incidentally found.  He underwent EVAR on 12/4/17.  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  He is on Plavix now.  On 5/13/19, he underwent and percutaneous aneurysm sac puncture and endo leak embolization by IR on 5/13/19.  -Follow-up with vascular surgery and IR.    4) Diabetes, hypertension:    -Following with PCP.    5) Atrial fibrillation with RVR s/p successful DCCV to SR:  -On metoprolol. He informs me he is off eliquis.  -Following with cardiology.    6) CAD:   -Follow-up with cardiology  -There are continued discussions in regards to PCI/CABG. He is uncertain if he is following with [place] or locally.  -He is on plavix.    7) Thrombocytopenia: stable.  It had decreased after his [place] hospitalization, may have been from his acute illness and Bactrim.  Platelet count is around his baseline now.  -Monitor CBC.    8) Recent basal cell carcinoma removal of the upper lip with ecchymosis and hematoma  -Patient with platelets ranging 60K-80K. On 2/14/23, his platelets were 78K. He does not currently have active bleed per se, but he does have evidence of hematoma.   -He is to see dermatology immediately after our appointment.     9) -Follow up with derm stat.   -Repeat CBC tomorrow AM with possible platelet transfusion if platelets are further declining or there is extension of hematoma.   -Otherwise, RTC in 3 months for follow-up and labs: CBC/diff, CMP, LDH.       Miriam Badillo, DO  Hematology/Oncology  [facility]

## 2023-02-16 NOTE — LETTER
2/16/2023         RE: Austin Garza  [address] [place] [address]        Dear Colleague,    Thank you for referring your patient, Austin Garza, to the [facility]. Please see a copy of my visit note below.    [facility]    Hematology/Oncology Established Patient Note      Today's Date: 2/16/23    Reason for Follow-up: CLL      HISTORY OF PRESENT ILLNESS: Austin Garza is an 82 year old male with CLL previously on ibrutinib and followed by Dr. Perry who has since relocated.     He has PMHx of DMII, HTN who presented with leukocytosis.  In November 2017, he was found to have elevated WBC of 61.7K, hemoglobin of 10.4, and platelet of 115K.  There were no other CBC results available between 2010 and 2017.  Prior to 2010, he had normal CBC, with normal to mild anemia, and mild thrombocytopenia.   He was asymptomatic.    He underwent bone marrow biopsy on 11/21/17, which was consistent with chronic lymphocytic leukemia/small lymphocytic lymphoma, with 13% ringed sideroblasts identified.  The presence of increased numbers of ringed sideroblasts raises the possibility of an associated myelodysplastic syndrome.  Dysplastic changes are not identified though.  Many cases exhibiting ringed sideroblasts are metabolic origin, without significant risk of progression to acute leukemia, and these typically do not show dysplastic morphology as is the case with this specimen.  15% ringed sideroblasts are required for a diagnosis for RARS, which this specimen does not meet.  Flow cytometry is also consistent with CLL/SLL.  Cytogenetics show two (10%) of the metaphase cells comprise a clone characterized by a deletion within the proximal long arm of a chromosome 13 as the sole karyotypic abnormality.  Three (15%) metaphases had loss of the Y chromosome as the sole abnormality.    CT scan on 11/29/17 shows mildly enlarged left axillary lymph node and  periaortic lymph nodes in the retroperitoneum of the abdomen.  Spleen is also enlarged.    On his staging CT scan for CLL, he was incidentally found to have a large infrarenal abdominal aortic aneurysm, measuring 7.6 cm.  He was seen by Dr. Thomas, and he underwent EVAR on 12/4/17.     He started ibrutinib on 5/4/20.  It was held 5/28/20-6/4/20 for tooth extraction.    He was hospitalized on 7/30/2021 for shortness of breath, new CHF, A. fib with RVR and ibrutinib was put on hold.  He was seen by cardiology and eventually underwent cardioversion for the A. fib.  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  He was started on Eliquis and metoprolol.  His Plavix was stopped.    He went to the [facility] ED on 9/26/21 and found to have pericardial effusion.  Due to lack of beds, he was transferred to [facility] for acute pericardial effusion and pericarditis.  He was started on colchicine and prednisone taper x 6 weeks.  He was also given Bactrim for PJP prophylaxis due duration of prednisone course.  He was discharged on 10/1/21.      Since his last visit in clinic, he states he has had multiple hospitalizations for back pain. He informs me he has cardiology follow up as he was discovered to have coronary artery disease. He has had angiogram. There are recommendations in regards to CABG vs PCI. He states he is uncertain if he will move forward with this. He will be seen in 11/3/22.      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 He had bleed with this and stitches were again placed. He has ecchymosis of the lip, chin, and neck. He feels swelling into the face. He is to see dermatology after this visit.       REVIEW OF SYSTEMS:   14 point ROS was reviewed and is negative other than as noted above in HPI.       HOME MEDICATIONS:  Current Outpatient Medications   Medication Sig Dispense Refill     amLODIPine (NORVASC) 5 MG tablet Take  "1.5 tablets (7.5 mg) by mouth daily 135 tablet 3     aspirin (ASA) 81 MG EC tablet Take 81 mg by mouth daily       blood glucose (CONTOUR NEXT TEST) test strip Use to test blood sugar 3-4 times daily or as directed. 100 strip 6     blood glucose monitoring (NO BRAND SPECIFIED) meter device kit Use to test blood sugar 2 times daily or as directed. 1 kit 0     clopidogrel (PLAVIX) 75 MG tablet Take 1 tablet (75 mg) by mouth daily 90 tablet 0     dulaglutide (TRULICITY) 1.5 MG/0.5ML pen Inject 1.5 mg Subcutaneous every 7 days 6 mL 3     metoprolol succinate ER (TOPROL XL) 100 MG 24 hr tablet Take 1 tablet (100 mg) by mouth daily 90 tablet 0     nitroGLYcerin (NITROSTAT) 0.4 MG sublingual tablet One tablet under the tongue every 5 minutes if needed for chest pain. May repeat every 5 minutes for a maximum of 3 doses in 15 minutes\" 25 tablet 3     rosuvastatin (CRESTOR) 20 MG tablet Take 1 tablet (20 mg) by mouth daily 90 tablet 3     triamcinolone (KENALOG) 0.1 % external ointment 1 APPLICATION TWICE A DAY TOPICALLY TO ITCHY AREAS ON BACK FOR 2 WEEKS. REPEAT WITH FLARES.           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      CLL (chronic lymphocytic leukemia)      Diaphragmatic hernia      Diverticulitis of colon      Esophageal reflux      Essential hypertension      Hyperlipidemia      Irritable bowel syndrome      Macular degeneration (senile) of retina      Mumps      Squamous Cell Carcinoma, Back 1988     Type 2 diabetes mellitus          PAST SURGICAL HISTORY:  Past Surgical History:   Procedure Laterality Date     ANESTHESIA CARDIOVERSION N/A 8/2/2021    Procedure: ANESTHESIA, FOR CARDIOVERSION;  Surgeon: GENERIC ANESTHESIA PROVIDER;  Location: [facility] OR     APPENDECTOMY OPEN  1966     BONE MARROW BIOPSY, BONE SPECIMEN, " NEEDLE/TROCAR N/A 2017    Procedure: BIOPSY BONE MARROW;  BONE MARROW BIOPSY;  Surgeon: Shady Garcia MD;  Location:  GI     COLONOSCOPY       CV CORONARY ANGIOGRAM N/A 2022    Procedure: Coronary Angiogram;  Surgeon: Evaristo Beaulieu MD;  Location:  HEART CARDIAC CATH LAB     ENDOVASCULAR REPAIR ANEURYSM ABDOMINAL AORTA N/A 2017    Procedure: ENDOVASCULAR REPAIR ANEURYSM ABDOMINAL AORTA;  ENDOVASCULAR REPAIR ANEURYSM ABDOMINAL AORTA;  Surgeon: Milton Cazares MD;  Location:  OR     Fistulotomy with marsupialization for repair of fisture in ano       IR ABDOMINAL AORTOGRAM  3/11/2019     IR VISCERAL EMBOLIZATION  2019     Multiple skin tags - resection       Squamous cell skin cancer resection - back       VASECTOMY           SOCIAL HISTORY:  Social History     Socioeconomic History     Marital status:      Spouse name: librado     Number of children: 0     Years of education: 17     Highest education level: Not on file   Occupational History     Occupation: retired   Tobacco Use     Smoking status: Former     Packs/day: 0.50     Years: 20.00     Pack years: 10.00     Types: Cigarettes     Quit date: 1975     Years since quittin.1     Smokeless tobacco: Former   Substance and Sexual Activity     Alcohol use: Yes     Alcohol/week: 10.0 - 14.0 standard drinks     Types: 10 - 14 Standard drinks or equivalent per week     Comment: 3 drinks a day/wine     Drug use: No     Sexual activity: Never   Other Topics Concern     Parent/sibling w/ CABG, MI or angioplasty before 65F 55M? Not Asked   Social History Narrative     Not on file     Social Determinants of Health     Financial Resource Strain: Not on file   Food Insecurity: Not on file   Transportation Needs: Not on file   Physical Activity: Not on file   Stress: Not on file   Social Connections: Not on file   Intimate Partner Violence: Not At Risk     Fear of Current or Ex-Partner: No      "Emotionally Abused: No     Physically Abused: No     Sexually Abused: No   Housing Stability: Not on file   He quit smoking in .  He drinks 1-3 glasses of wine a night with dinner.  He is retired, and used to work as a .  He lives with his wife in [place].  His cousin had lung cancer and  around age 69.  Otherwise, he denies family history of cancer.        FAMILY HISTORY:  Family History   Problem Relation Age of Onset     Cerebrovascular Disease Father         x 2     Hypertension Mother      C.A.D. Mother      Cancer Mother         skin     Eye Disorder Mother         glaucoma     Prostate Cancer No family hx of      Cancer - colorectal No family hx of      Glaucoma No family hx of      Macular Degeneration No family hx of          PHYSICAL EXAM:  BP (!) 142/86 (Cuff Size: Adult Large)   Pulse 78   Temp 98  F (36.7  C) (Tympanic)   Resp 18   Ht 1.753 m (5' 9\")   Wt 94.3 kg (208 lb)   SpO2 99%   BMI 30.72 kg/m    ECO  HEENT: Large bandage of the upper lip. He has ecchymosis of the oral labial fold into chin and upper cervical area. He has tenderness of the sinus.   GENERAL/CONSTITUTIONAL: No acute distress.  EYES: No scleral icterus.  NEUROLOGIC: Alert, oriented, answers questions appropriately.  INTEGUMENTARY: No jaundice.    GAIT: Steady, does not use assistive device      LABS:   Latest Reference Range & Units 23 09:25   Sodium 136 - 145 mmol/L 137   Potassium 3.4 - 5.3 mmol/L 4.5   Chloride 98 - 107 mmol/L 101   Carbon Dioxide (CO2) 22 - 29 mmol/L 25   Urea Nitrogen 8.0 - 23.0 mg/dL 17.4   Creatinine 0.67 - 1.17 mg/dL 1.40 (H)   GFR Estimate >60 mL/min/1.73m2 51 (L)   Calcium 8.8 - 10.2 mg/dL 9.0   Anion Gap 7 - 15 mmol/L 11   Albumin 3.5 - 5.2 g/dL 4.1   Protein Total 6.4 - 8.3 g/dL 6.5   Alkaline Phosphatase 40 - 129 U/L 64   ALT 10 - 50 U/L 25   AST 10 - 50 U/L 22   Bilirubin Total <=1.2 mg/dL 0.5   Glucose 70 - 99 mg/dL 243 (H)   Lactate Dehydrogenase 0 - 250 U/L 176   WBC 4.0 " - 11.0 10e3/uL 7.6   Hemoglobin 13.3 - 17.7 g/dL 11.3 (L)   Hematocrit 40.0 - 53.0 % 34.0 (L)   Platelet Count 150 - 450 10e3/uL 78 (L)   RBC Count 4.40 - 5.90 10e6/uL 3.67 (L)   MCV 78 - 100 fL 93   MCH 26.5 - 33.0 pg 30.8   MCHC 31.5 - 36.5 g/dL 33.2   RDW 10.0 - 15.0 % 15.1 (H)   % Neutrophils % 42   % Lymphocytes % 54   % Monocytes % 3   % Eosinophils % 1   % Basophils % 0   Absolute Basophils 0.0 - 0.2 10e3/uL 0.0   Absolute Eosinophils 0.0 - 0.7 10e3/uL 0.1   Absolute Immature Granulocytes <=0.4 10e3/uL 0.0   Absolute Lymphocytes 0.8 - 5.3 10e3/uL 4.0   Absolute Monocytes 0.0 - 1.3 10e3/uL 0.3   % Immature Granulocytes % 0   Absolute Neutrophils 1.6 - 8.3 10e3/uL 3.2   Absolute NRBCs 10e3/uL 0.0   NRBCs per 100 WBC <1 /100 0       ASSESSMENT/PLAN:  Austin Garza is an 82 year old male with:    1) CLL/SLL: BLACKWOOD stage III, with lymphocytosis, lymphadenopathy, splenomegaly, and anemia.  He has mild thrombocytopenia as well, but is above 100K.  He presented with leukocytosis with WBC of 61.7K, hemoglobin of 10.4, and platelet count of 115K on diagnosis.  Bone marrow biopsy and flow cytometry confirmed CLL/SLL.  CT scan shows mildly enlarged left axillary lymph node and periaortic lymph nodes in the retroperitoneum of the abdomen, with enlarged spleen.      Due to worsening lymphocyte count, anemia, thrombocytopenia, as well as fatigue and night sweats, he started ibrutinib on 5/4/20.      His WBC normalized, with stable hemoglobin and platelet count.    In light of his recent issues with atrial fibrillation and now on Eliquis, his ibrutinib has been on hold since August 2021.  Considering his CLL is under good control, his WBC is actually on the lower end of normal, and with risk of atrial fibrillation and bleeding on anticoagulants, we will continue to hold ibrutinib for now and monitor his counts. He has persistent thrombocytopenia. He will likely need to resume treatment at some point, but we can follow the  counts and we could also consider other treatments as well for CLL.      -Continue to hold ibrutinib.  -He has not had frequent or recent infections.  -CBC stable.    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 He completed radiation at [facility] with planned 60 Gy x 30 fractions. He has completed radiation and noted that he did not need any more follow-up for this.    3) Abdominal aortic aneurysm: measures up to 7.6 cm on CT scan, incidentally found.  He underwent EVAR on 12/4/17.  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  He is on Plavix now.  On 5/13/19, he underwent and percutaneous aneurysm sac puncture and endo leak embolization by IR on 5/13/19.  -Follow-up with vascular surgery and IR.    4) Diabetes, hypertension:    -Following with PCP.    5) Atrial fibrillation with RVR s/p successful DCCV to SR:  -On metoprolol. He informs me he is off eliquis.  -Following with cardiology.    6) CAD:   -Follow-up with cardiology  -There are continued discussions in regards to PCI/CABG. He is uncertain if he is following with [place] or locally.  -He is on plavix.    7) Thrombocytopenia: stable.  It had decreased after his [place] hospitalization, may have been from his acute illness and Bactrim.  Platelet count is around his baseline now.  -Monitor CBC.    8) Recent basal cell carcinoma removal of the upper lip with ecchymosis and hematoma  -Patient with platelets ranging 60K-80K. On 2/14/23, his platelets were 78K. He does not currently have active bleed per se, but he does have evidence of hematoma.   -He is to see dermatology immediately after our appointment.     9) -Follow up with derm stat.   -Repeat CBC tomorrow AM with possible platelet transfusion if platelets are further declining or there is extension of hematoma.   -Otherwise, RTC in 3 months for follow-up and labs: CBC/diff, CMP, LDH.       Miriam Badillo,  DO  Hematology/Oncology  [facility]        Again, thank you for allowing me to participate in the care of your patient.        Sincerely,        Miriam Badillo DO

## 2023-02-16 NOTE — NURSING NOTE
"Oncology Rooming Note    February 16, 2023 11:30 AM   Austin Garza is a 82 year old male who presents for:    Chief Complaint   Patient presents with     Oncology Clinic Visit     CLL (chronic lymphocytic leukemia)      Initial Vitals: BP (!) 142/86 (Cuff Size: Adult Large)   Pulse 78   Temp 98  F (36.7  C) (Tympanic)   Resp 18   Ht 1.753 m (5' 9\")   Wt 94.3 kg (208 lb)   SpO2 99%   BMI 30.72 kg/m   Estimated body mass index is 30.72 kg/m  as calculated from the following:    Height as of this encounter: 1.753 m (5' 9\").    Weight as of this encounter: 94.3 kg (208 lb). Body surface area is 2.14 meters squared.  Mild Pain (2) Comment: Data Unavailable   No LMP for male patient.  Allergies reviewed: Yes  Medications reviewed: Yes    Medications: Medication refills not needed today.  Pharmacy name entered into [email or web address]: [id number] - [place], [place] - [id number] [facility]    Clinical concerns: f/u       Delfina Baker, JERI              "

## 2023-02-17 ENCOUNTER — INFUSION THERAPY VISIT (OUTPATIENT)
Dept: INFUSION THERAPY | Facility: CLINIC | Age: 83
End: 2023-02-17
Attending: INTERNAL MEDICINE
Payer: COMMERCIAL

## 2023-02-17 DIAGNOSIS — D69.6 THROMBOCYTOPENIA (H): Primary | ICD-10-CM

## 2023-02-17 DIAGNOSIS — S00.83XA FACIAL HEMATOMA, INITIAL ENCOUNTER: ICD-10-CM

## 2023-02-17 DIAGNOSIS — C91.10 CHRONIC LYMPHOCYTIC LEUKEMIA OF B-CELL TYPE NOT HAVING ACHIEVED REMISSION (H): ICD-10-CM

## 2023-02-17 LAB
ABO/RH(D): NORMAL
ANTIBODY SCREEN: NEGATIVE
BASOPHILS # BLD AUTO: 0 10E3/UL (ref 0–0.2)
BASOPHILS NFR BLD AUTO: 0 %
EOSINOPHIL # BLD AUTO: 0.2 10E3/UL (ref 0–0.7)
EOSINOPHIL NFR BLD AUTO: 2 %
ERYTHROCYTE [DISTWIDTH] IN BLOOD BY AUTOMATED COUNT: 14.9 % (ref 10–15)
HCT VFR BLD AUTO: 33.2 % (ref 40–53)
HGB BLD-MCNC: 11 G/DL (ref 13.3–17.7)
IMM GRANULOCYTES # BLD: 0 10E3/UL
IMM GRANULOCYTES NFR BLD: 1 %
LYMPHOCYTES # BLD AUTO: 3.9 10E3/UL (ref 0.8–5.3)
LYMPHOCYTES NFR BLD AUTO: 49 %
MCH RBC QN AUTO: 31.1 PG (ref 26.5–33)
MCHC RBC AUTO-ENTMCNC: 33.1 G/DL (ref 31.5–36.5)
MCV RBC AUTO: 94 FL (ref 78–100)
MONOCYTES # BLD AUTO: 0.4 10E3/UL (ref 0–1.3)
MONOCYTES NFR BLD AUTO: 5 %
NEUTROPHILS # BLD AUTO: 3.4 10E3/UL (ref 1.6–8.3)
NEUTROPHILS NFR BLD AUTO: 43 %
NRBC # BLD AUTO: 0 10E3/UL
NRBC BLD AUTO-RTO: 0 /100
PLATELET # BLD AUTO: 66 10E3/UL (ref 150–450)
RBC # BLD AUTO: 3.54 10E6/UL (ref 4.4–5.9)
SPECIMEN EXPIRATION DATE: NORMAL
WBC # BLD AUTO: 7.9 10E3/UL (ref 4–11)

## 2023-02-17 PROCEDURE — 85025 COMPLETE CBC W/AUTO DIFF WBC: CPT | Performed by: INTERNAL MEDICINE

## 2023-02-17 PROCEDURE — 36415 COLL VENOUS BLD VENIPUNCTURE: CPT | Performed by: INTERNAL MEDICINE

## 2023-02-17 PROCEDURE — 86901 BLOOD TYPING SEROLOGIC RH(D): CPT | Performed by: INTERNAL MEDICINE

## 2023-02-17 PROCEDURE — 86850 RBC ANTIBODY SCREEN: CPT | Performed by: INTERNAL MEDICINE

## 2023-02-17 RX ORDER — HEPARIN SODIUM,PORCINE 10 UNIT/ML
5 VIAL (ML) INTRAVENOUS
Status: CANCELLED | OUTPATIENT
Start: 2023-02-17

## 2023-02-17 RX ORDER — HEPARIN SODIUM (PORCINE) LOCK FLUSH IV SOLN 100 UNIT/ML 100 UNIT/ML
5 SOLUTION INTRAVENOUS
Status: CANCELLED | OUTPATIENT
Start: 2023-02-17

## 2023-02-17 NOTE — PROGRESS NOTES
Nursing Note:  Austin Garza presents today for Possible platelet transfusion- labs.    Patient seen by provider today: No   present during visit today: Not Applicable.    Note: pt had labs drawn, IV placed in case needed transfusion.  Pt did NOT meet parameters to receive platelet transfusion today (plt 66)- IV was removed and patient sent home.    Intravenous Access:  Peripheral IV placed. Removed prior to discharge    Discharge Plan:   Patient was sent home with wife- scheduling to call and follow up for next appointment. With Dr. Lee Kari Schoen, RN

## 2023-02-20 ENCOUNTER — HOSPITAL ENCOUNTER (OUTPATIENT)
Dept: CARDIAC REHAB | Facility: CLINIC | Age: 83
Discharge: HOME | End: 2023-02-20
Attending: INTERNAL MEDICINE
Payer: COMMERCIAL

## 2023-02-20 PROCEDURE — 93798 PHYS/QHP OP CAR RHAB W/ECG: CPT

## 2023-02-21 ENCOUNTER — ALLIED HEALTH/NURSE VISIT (OUTPATIENT)
Dept: EDUCATION SERVICES | Facility: CLINIC | Age: 83
End: 2023-02-21
Attending: INTERNAL MEDICINE
Payer: COMMERCIAL

## 2023-02-21 DIAGNOSIS — E11.69 TYPE 2 DIABETES MELLITUS WITH OTHER SPECIFIED COMPLICATION, WITH LONG-TERM CURRENT USE OF INSULIN (H): ICD-10-CM

## 2023-02-21 DIAGNOSIS — Z79.4 TYPE 2 DIABETES MELLITUS WITH OTHER SPECIFIED COMPLICATION, WITH LONG-TERM CURRENT USE OF INSULIN (H): ICD-10-CM

## 2023-02-21 PROCEDURE — 95250 CONT GLUC MNTR PHYS/QHP EQP: CPT

## 2023-02-21 NOTE — PROGRESS NOTES
Diabetes Self-Management Education & Support  Professional Continuous Glucose Monitor Insertion    SUBJECTIVE/OBJECTIVE:     Austin Garza presents for professional Continuous Glucose Monitor Insertion.     Patient comments/concerns: Want to be sure he is not missing any hypo or hyperglycemia events, even though A1C is at goal.    Lab Results:  Lab Results   Component Value Date    A1C 6.4 08/02/2022    A1C 5.9 04/05/2021      Lab Results   Component Value Date     02/14/2023     08/02/2022     06/01/2021       Medication:  Diabetes Medication(s)     Incretin Mimetic Agents       dulaglutide (TRULICITY) 1.5 MG/0.5ML pen    Inject 1.5 mg Subcutaneous every 7 days          ASSESSMENT:    CGM study indicated for: Other (Look for any hypoglycemia or hyperglycemia he may not be aware of.)     INTERVENTION:   Sensor started today.     Sensor Type: LibrePro  Lot #: [id number]  Serial #: [id number]  Expiration Date: 07/31/23       Sensor was inserted with no resistance or bleeding at insertion site.      Pt will plan to wear the sensor through 3-7-23.    WRITTEN AND VERBAL INFORMATION GIVEN TO SUPPORT UNDERSTANDING OF:  LibrePro CGM: Sensor insertion, intention of monitoring for 14 days. Keep records of BG, food intake, exercise, and medication dosing during wear.       Patient verbalizes understanding of how to remove sensor, if needed, and all instructions provided.     Educational and other materials:  Food/exercise/medication log sheets  Contact information    PLAN:  Pt was given instructions for tracking BG, medications, food intake and activity.  Patient to return all items associated with the professional Continuous Glucose Monitor System.  See Patient Instructions, AVS printed and provided to patient today.    Follow-up:    Follow up on 3-7-23 at 11am.    Fanny Roberts RN, [facility]    Time spent in DSMT: 5 minutes   Time spent in CGM insertion: 20 minutes, in addition to time spent in  DSMT  Encounter Type: Individual

## 2023-02-21 NOTE — PATIENT INSTRUCTIONS
1. Plan to wear the LibrePro sensor for 14 days. It is okay to shower, bathe, and swim (up to 3 feet deep for 30 minutes)    2. Continue with your usual diabetes care plan - check blood sugars and take medicines, as prescribed.    3. Keep a log of what you eat and drink, when you take your medications and how much you take, and exercise you do while you are wearing the sensor.    4. Do not cover the sensor with extra adhesive (the small hole in the center of the sensor must remain uncovered)    5. Use a little extra care, especially when getting dressed or exercising, to avoid accidentally loosening or removing the sensor.     6. Remove the sensor if you need to have an MRI or CT scan.     If the LibrePro sensor comes off early, place it in a plastic bag or envelope and call your diabetes educator or bring it with you to your follow-up visit.     Return the sensor to the [facility] on 3-7-23.    Follow-up appointment: 3-7-23 at 11am    [place] [facility] for the [facility] Area:  For Your Diabetes Education and Nutrition Appointments Call:  [phone]   For Diabetes Education or Nutrition Related Questions:   Phone: [phone]  Send [employer] Message   If you need a medication refill please contact your pharmacy. Please allow 3 business days for your refills to be completed.

## 2023-02-21 NOTE — LETTER
2/21/2023         RE: Austin Garza  [address] [place] [address]        Dear Colleague,    Thank you for referring your patient, Austin Garza, to the [facility]. Please see a copy of my visit note below.    Diabetes Self-Management Education & Support  Professional Continuous Glucose Monitor Insertion    SUBJECTIVE/OBJECTIVE:     Austin Garza presents for professional Continuous Glucose Monitor Insertion.     Patient comments/concerns: Want to be sure he is not missing any hypo or hyperglycemia events, even though A1C is at goal.    Lab Results:  Lab Results   Component Value Date    A1C 6.4 08/02/2022    A1C 5.9 04/05/2021      Lab Results   Component Value Date     02/14/2023     08/02/2022     06/01/2021       Medication:  Diabetes Medication(s)     Incretin Mimetic Agents       dulaglutide (TRULICITY) 1.5 MG/0.5ML pen    Inject 1.5 mg Subcutaneous every 7 days          ASSESSMENT:    CGM study indicated for: Other (Look for any hypoglycemia or hyperglycemia he may not be aware of.)     INTERVENTION:   Sensor started today.     Sensor Type: LibrePro  Lot #: [id number]  Serial #: [id number]  Expiration Date: 07/31/23       Sensor was inserted with no resistance or bleeding at insertion site.      Pt will plan to wear the sensor through 3-7-23.    WRITTEN AND VERBAL INFORMATION GIVEN TO SUPPORT UNDERSTANDING OF:  LibrePro CGM: Sensor insertion, intention of monitoring for 14 days. Keep records of BG, food intake, exercise, and medication dosing during wear.       Patient verbalizes understanding of how to remove sensor, if needed, and all instructions provided.     Educational and other materials:  Food/exercise/medication log sheets  Contact information    PLAN:  Pt was given instructions for tracking BG, medications, food intake and activity.  Patient to return all items associated with the professional Continuous Glucose Monitor System.  See  Patient Instructions, AVS printed and provided to patient today.    Follow-up:    Follow up on 3-7-23 at 11am.    Fanny Roberts RN, [facility]    Time spent in DSMT: 5 minutes   Time spent in CGM insertion: 20 minutes, in addition to time spent in DSMT  Encounter Type: Individual

## 2023-02-23 NOTE — PATIENT INSTRUCTIONS
Benji is scheduled for labs on 05/15/23 and a follow up with Dr. Badillo on 05/17/23.    Letty Arevalo RN on 2/23/2023 at 3:35 PM

## 2023-02-24 ENCOUNTER — HOSPITAL ENCOUNTER (OUTPATIENT)
Dept: CARDIAC REHAB | Facility: CLINIC | Age: 83
Discharge: HOME | End: 2023-02-24
Attending: INTERNAL MEDICINE
Payer: COMMERCIAL

## 2023-02-24 PROCEDURE — 93798 PHYS/QHP OP CAR RHAB W/ECG: CPT | Performed by: OCCUPATIONAL THERAPIST

## 2023-02-27 ENCOUNTER — HOSPITAL ENCOUNTER (OUTPATIENT)
Dept: CARDIAC REHAB | Facility: CLINIC | Age: 83
Discharge: HOME | End: 2023-02-27
Attending: INTERNAL MEDICINE
Payer: COMMERCIAL

## 2023-02-27 PROCEDURE — 93798 PHYS/QHP OP CAR RHAB W/ECG: CPT | Performed by: OCCUPATIONAL THERAPIST

## 2023-03-01 ENCOUNTER — HOSPITAL ENCOUNTER (OUTPATIENT)
Dept: CARDIAC REHAB | Facility: CLINIC | Age: 83
Discharge: HOME | End: 2023-03-01
Attending: INTERNAL MEDICINE
Payer: COMMERCIAL

## 2023-03-01 PROCEDURE — 93798 PHYS/QHP OP CAR RHAB W/ECG: CPT | Performed by: REHABILITATION PRACTITIONER

## 2023-03-06 ENCOUNTER — HOSPITAL ENCOUNTER (OUTPATIENT)
Dept: CARDIAC REHAB | Facility: CLINIC | Age: 83
Discharge: HOME | End: 2023-03-06
Attending: INTERNAL MEDICINE
Payer: COMMERCIAL

## 2023-03-06 PROCEDURE — 93798 PHYS/QHP OP CAR RHAB W/ECG: CPT

## 2023-03-07 ENCOUNTER — ALLIED HEALTH/NURSE VISIT (OUTPATIENT)
Dept: EDUCATION SERVICES | Facility: CLINIC | Age: 83
End: 2023-03-07
Payer: COMMERCIAL

## 2023-03-07 DIAGNOSIS — E11.69 TYPE 2 DIABETES MELLITUS WITH OTHER SPECIFIED COMPLICATION, WITH LONG-TERM CURRENT USE OF INSULIN (H): Primary | ICD-10-CM

## 2023-03-07 DIAGNOSIS — Z79.4 TYPE 2 DIABETES MELLITUS WITH OTHER SPECIFIED COMPLICATION, WITH LONG-TERM CURRENT USE OF INSULIN (H): Primary | ICD-10-CM

## 2023-03-07 PROCEDURE — 99207 PR DROP WITH A PROCEDURE: CPT

## 2023-03-07 PROCEDURE — G0108 DIAB MANAGE TRN  PER INDIV: HCPCS

## 2023-03-07 RX ORDER — DULAGLUTIDE 3 MG/.5ML
3 INJECTION, SOLUTION SUBCUTANEOUS
Qty: 6 ML | Refills: 3 | Status: SHIPPED | OUTPATIENT
Start: 2023-03-07 | End: 2023-01-01

## 2023-03-07 NOTE — LETTER
3/7/2023         RE: Austin Garza  [address] [place] [address]        Dear Colleague,    Thank you for referring your patient, Austin Garza, to the [facility]. Please see a copy of my visit note below.      Diabetes Self-Management Education & Support    Presents for: CGM Review    Type of Service: In Person Visit    Assessment Type:   REPORTS:        Pt verbalized understanding of concepts discussed and recommendations provided today.       Continue education with the following diabetes management concepts: Healthy Eating, Monitoring, Taking Medication, Problem Solving and Reducing Risks    ASSESSMENT:  Benji brought food records to this visit that show he is following the meal plan for diabetes.   Typical carbohydrate intake:  Breakfast: ~30 grams carb  AM snack: 0-15 grams  Lunch: 15-30 grams  PM snack: 0-15 grams  Evening meal: ~30-45 grams (most often)    Carlita reports show increased glucose readings after meals despite lower carbohydrate intake. GMI at 7.4% with Time in target below 70% goal, at 67% for the past 2 weeks. Recommend increasing Trulicity per protocol.      PLAN  Benji has a 2 month supply of 1.5 mg Trulicity dose. He is agreeable to injecting one dose on each side of abdomen (total 3.0 mg) to use supplies in the next month. He will continue to rotate injection sites.  He would like to use Carlita Pro next month to determine if increased Trulicity dose has improved BG.   Placement and Follow Up Carlita Pro appt's scheduled with diabetes educator in [place] in April.  Trulicity prescription updated to 3.0 mg once weekly.  Enc to test BG fasting and rotate after meal BG check before next diabetes appt.     Follow-up: April 4 and April 18th  * He may want to pay out of pocket for personal CGM. Did not have phone at education appt today. Continue conversation at next diabetes appt.     See Care Plan for co-developed, patient-state behavior change  "goals.  Topics to cover at upcoming visits: Monitoring, Taking Medication, Problem Solving and Reducing Risks    Education Materials Provided:  BG Log Sheet      SUBJECTIVE/OBJECTIVE:  Presents for: CGM Review  Accompanied by: Self  Diabetes education in the past 24mo: No  Focus of Visit: Monitoring  Type of CGM visit: Personal CGM Follow-up  Diabetes type: Type 2  Disease course: Stable  How confident are you filling out medical forms by yourself:: Extremely  Transportation concerns: No  Difficulty affording diabetes medication?: No  Difficulty affording diabetes testing supplies?: No  Other concerns:: None  Cultural Influences/Ethnic Background:  Not  or     Diabetes Symptoms & Complications:  Fatigue: Yes  Neuropathy: Yes  Polydipsia: No  Polyphagia: No  Polyuria: No  Visual change: Sometimes  Slow healing wounds: Sometimes  Complications assessed today?: No  Autonomic neuropathy: No  CVA: No  Heart disease: Yes  Nephropathy: Yes  Peripheral neuropathy: No  Peripheral Vascular Disease: No  Retinopathy: No  Sexual dysfunction: Yes    Patient Problem List and Family Medical History reviewed for relevant medical history, current medical status, and diabetes risk factors.    Vitals:  There were no vitals taken for this visit.  Estimated body mass index is 30.72 kg/m  as calculated from the following:    Height as of 2/16/23: 1.753 m (5' 9\").    Weight as of 2/16/23: 94.3 kg (208 lb).   Last 3 BP:   BP Readings from Last 3 Encounters:   02/16/23 (!) 142/86   11/28/22 138/68   10/14/22 126/78       History   Smoking Status     Former     Packs/day: 0.50     Years: 20.00     Types: Cigarettes     Quit date: 1/1/1975   Smokeless Tobacco     Former       Labs:  Lab Results   Component Value Date    A1C 6.4 08/02/2022    A1C 5.9 04/05/2021     Lab Results   Component Value Date     02/14/2023     08/02/2022     06/01/2021     Lab Results   Component Value Date    LDL 58 02/15/2022    LDL " 96 04/05/2021     HDL Cholesterol   Date Value Ref Range Status   04/05/2021 57 >39 mg/dL Final     Direct Measure HDL   Date Value Ref Range Status   02/15/2022 33 (L) >=40 mg/dL Final   ]  GFR Estimate   Date Value Ref Range Status   02/14/2023 51 (L) >60 mL/min/1.73m2 Final     Comment:     eGFR calculated using 2021 CKD-EPI equation.   06/01/2021 43 (L) >60 mL/min/[1.73_m2] Final     Comment:     Non  GFR Calc  Starting 12/18/2018, serum creatinine based estimated GFR (eGFR) will be   calculated using the Chronic Kidney Disease Epidemiology Collaboration   (CKD-EPI) equation.       GFR Estimate If Black   Date Value Ref Range Status   06/01/2021 50 (L) >60 mL/min/[1.73_m2] Final     Comment:      GFR Calc  Starting 12/18/2018, serum creatinine based estimated GFR (eGFR) will be   calculated using the Chronic Kidney Disease Epidemiology Collaboration   (CKD-EPI) equation.       Lab Results   Component Value Date    CR 1.40 02/14/2023    CR 1.51 06/01/2021     No results found for: MICROALBUMIN    Healthy Eating:  Healthy Eating Assessed Today: Yes  Cultural/Church diet restrictions?: No  Meal planning/habits: Avoiding sweets, Low carb, Low salt, Smaller portions  How many times a week on average do you eat food made away from home (restaurant/take-out)?: 4  Meals include: Breakfast, Lunch, Dinner  Beverages: Coffee, Milk, Diet soda, Alcohol    Being Active:  Being Active Assessed Today: Yes  Exercise:: Yes (cardiac rehab)  Days per week of moderate to strenuous exercise (like a brisk walk): (P) 3  On average, minutes per day of exercise at this level: (P) 30  How intense was your typical exercise? : (P) Moderate (like brisk walking)  Exercise Minutes per Week: (P) 90  Barrier to exercise: Physical limitation    Monitoring:  Monitoring Assessed Today: Yes  Did patient bring glucose meter to appointment? : Yes (Professional CGM review)  Blood Glucose Meter: Accu-chek, CGM  Times  checking blood sugar at home (number): Never  Blood glucose trend: higher post prandial glucose results    Taking Medications:  Diabetes Medication(s)     Incretin Mimetic Agents       dulaglutide (TRULICITY) 1.5 MG/0.5ML pen    Inject 1.5 mg Subcutaneous every 7 days          Taking Medication Assessed Today: Yes  Current Treatments: Non-insulin Injectables  Problems taking diabetes medications regularly?: No  Diabetes medication side effects?: No    Problem Solving:  Problem Solving Assessed Today: Yes  Is the patient at risk for hypoglycemia?: No     Reducing Risks:  CAD Risks: Diabetes Mellitus, Dyslipidemia, Hypertension, Obesity  Has dilated eye exam at least once a year?: Yes  Sees dentist every 6 months?: Yes  Feet checked by healthcare provider in the last year?: No    Healthy Coping:  Healthy Coping Assessed Today: Yes  Emotional response to diabetes: Ready to learn  Informal Support system:: Spouse  Stage of change: ACTION (Actively working towards change)  Patient Activation Measure Survey Score:  SANDIE Score (Last Two) 5/17/2011 9/24/2013   ASNDIE Raw Score 39 44   Activation Score 56.4 70.8   SANDIE Level 3 4         Care Plan and Education Provided:  Care Plan: Diabetes   Updates made by Molly Fisher since 3/7/2023 12:00 AM      Problem: HbA1C Not In Goal       Goal: Establish Regular Follow-Ups with PCP       Task: Discuss with PCP the recommended timing for patient's next follow up visit(s)    Responsible User: Molly Fisher      Task: Discuss schedule for PCP visits with patient    Responsible User: Molly Fisher      Goal: Get HbA1C Level in Goal       Task: Educate patient on diabetes education self-management topics    Responsible User: Molly Fisher      Task: Educate patient on benefits of regular glucose monitoring    Responsible User: Molly Fisher      Task: Refer patient to appropriate extended care team member, as needed (Medication Therapy Management, Behavioral Health, Physical  Therapy, etc.)    Responsible User: Molly Fisher      Task: Discuss diabetes treatment plan with patient    Responsible User: Molly Fisher      Problem: Diabetes Self-Management Education Needed to Optimize Self-Care Behaviors       Goal: Understand diabetes pathophysiology and disease progression       Task: Provide education on diabetes pathophysiology and disease progression specfic to patient's diabetes type    Responsible User: Molly Fisher      Goal: Healthy Eating - follow a healthy eating pattern for diabetes       Task: Provide education on portion control and consistency in amount, composition and timing of food intake Completed 3/7/2023   Responsible User: Molly Fisher      Task: Provide education on managing carbohydrate intake (carbohydrate counting, plate planning method, etc.)    Responsible User: Molly Fisher      Task: Provide education on weight management    Responsible User: Molly Fisher      Task: Provide education on heart healthy eating    Responsible User: Molly Fisher      Task: Provide education on eating out    Responsible User: Molly Fisher      Task: Develop individualized healthy eating plan with patient    Responsible User: Molly Fisher      Goal: Being Active - get regular physical activity, working up to at least 150 minutes per week       Task: Provide education on relationship of activity to glucose and precautions to take if at risk for low glucose Completed 3/7/2023   Responsible User: Molly Fisher      Task: Discuss barriers to physical activity with patient Completed 3/7/2023   Responsible User: Molly Fisher      Task: Develop physical activity plan with patient    Responsible User: Molly Fisher      Task: Explore community resources including walking groups, assistance programs, and home videos    Responsible User: Molly Fisher      Goal: Monitoring - monitor glucose and ketones as directed       Task: Provide education on  blood glucose monitoring (purpose, proper technique, frequency, glucose targets, interpreting results, when to use glucose control solution, sharps disposal) Completed 3/7/2023   Responsible User: Molly Fisher      Task: Provide education on continuous glucose monitoring (sensor placement, use of oleg or /reader, understanding glucose trends, alerts and alarms, differences between sensor glucose and blood glucose) Completed 3/7/2023   Responsible User: Molly Fisher      Task: Provide education on ketone monitoring (when to monitor, frequency, etc.)    Responsible User: Molly Fisher      Goal: Taking Medication - patient is consistently taking medications as directed    Start Date: 3/7/2023   This Visit's Progress: 0%   Note:    Increase Trulicity to 3.0 mg once weekly as discussed     Task: Provide education on action of prescribed medication, including when to take and possible side effects Completed 3/7/2023   Responsible User: Molly Fisher      Task: Provide education on insulin and injectable diabetes medications, including administration, storage, site selection and rotation for injection sites    Responsible User: Molly Fisher      Task: Discuss barriers to medication adherence with patient and provide management technique ideas as appropriate    Responsible User: Molly Fisher      Task: Provide education on frequency and refill details of medications    Responsible User: Molly Fisher      Goal: Problem Solving - know how to prevent and manage short-term diabetes complications       Task: Provide education on high blood glucose - causes, signs/symptoms, prevention and treatment Completed 3/7/2023   Responsible User: Molly Fisher      Task: Provide education on low blood glucose - causes, signs/symptoms, prevention, treatment, carrying a carbohydrate source at all times, and medical identification    Responsible User: Molly Fisher      Task: Provide education on safe  travel with diabetes    Responsible User: Molly Fisher      Task: Provide education on how to care for diabetes on sick days    Responsible User: Molly Fisher      Task: Provide education on when to call a health care provider    Responsible User: Molly Fisher      Goal: Reducing Risks - know how to prevent and treat long-term diabetes complications       Task: Provide education on major complications of diabetes, prevention, early diagnostic measures and treatment of complications    Responsible User: Molly Fisher      Task: Provide education on recommended care for dental, eye and foot health    Responsible User: Molly Fisher      Task: Provide education on Hemoglobin A1c - goals and relationship to blood glucose levels    Responsible User: Molly Fisher      Task: Provide education on recommendations for heart health - lipid levels and goals, blood pressure and goals, and aspirin therapy, if indicated    Responsible User: Molly Fisher      Task: Provide education on tobacco cessation    Responsible User: Molly Fisher      Goal: Healthy Coping - use available resources to cope with the challenges of managing diabetes       Task: Discuss recognizing feelings about having diabetes    Responsible User: Molly Fisher      Task: Provide education on the benefits of making appropriate lifestyle changes Completed 3/7/2023   Responsible User: Molly Fisher      Task: Provide education on benefits of utilizing support systems    Responsible User: Molly Fisher      Task: Discuss methods for coping with stress    Responsible User: Molly Fisher      Task: Provide education on when to seek professional counseling    Responsible User: Molly Fisher RDN, LD, CDCES    Time Spent: 40 minutes  Encounter Type: Individual    Any diabetes medication dose changes were made via the CDE Protocol per the patient's referring provider. A copy of this encounter was shared  with the provider.

## 2023-03-07 NOTE — PROGRESS NOTES
Diabetes Self-Management Education & Support    Presents for: CGM Review    Type of Service: In Person Visit    Assessment Type:   REPORTS:        Pt verbalized understanding of concepts discussed and recommendations provided today.       Continue education with the following diabetes management concepts: Healthy Eating, Monitoring, Taking Medication, Problem Solving and Reducing Risks    ASSESSMENT:  Benji brought food records to this visit that show he is following the meal plan for diabetes.   Typical carbohydrate intake:  Breakfast: ~30 grams carb  AM snack: 0-15 grams  Lunch: 15-30 grams  PM snack: 0-15 grams  Evening meal: ~30-45 grams (most often)    Carlita reports show increased glucose readings after meals despite lower carbohydrate intake. GMI at 7.4% with Time in target below 70% goal, at 67% for the past 2 weeks. Recommend increasing Trulicity per protocol.      PLAN  Benji has a 2 month supply of 1.5 mg Trulicity dose. He is agreeable to injecting one dose on each side of abdomen (total 3.0 mg) to use supplies in the next month. He will continue to rotate injection sites.  He would like to use Carlita Pro next month to determine if increased Trulicity dose has improved BG.   Placement and Follow Up Carlita Pro appt's scheduled with diabetes educator in [place] in April.  Trulicity prescription updated to 3.0 mg once weekly.  Enc to test BG fasting and rotate after meal BG check before next diabetes appt.     Follow-up: April 4 and April 18th  * He may want to pay out of pocket for personal CGM. Did not have phone at education appt today. Continue conversation at next diabetes appt.     See Care Plan for co-developed, patient-state behavior change goals.  Topics to cover at upcoming visits: Monitoring, Taking Medication, Problem Solving and Reducing Risks    Education Materials Provided:  BG Log Sheet      SUBJECTIVE/OBJECTIVE:  Presents for: CGM Review  Accompanied by: Self  Diabetes education in the past  "24mo: No  Focus of Visit: Monitoring  Type of CGM visit: Personal CGM Follow-up  Diabetes type: Type 2  Disease course: Stable  How confident are you filling out medical forms by yourself:: Extremely  Transportation concerns: No  Difficulty affording diabetes medication?: No  Difficulty affording diabetes testing supplies?: No  Other concerns:: None  Cultural Influences/Ethnic Background:  Not  or     Diabetes Symptoms & Complications:  Fatigue: Yes  Neuropathy: Yes  Polydipsia: No  Polyphagia: No  Polyuria: No  Visual change: Sometimes  Slow healing wounds: Sometimes  Complications assessed today?: No  Autonomic neuropathy: No  CVA: No  Heart disease: Yes  Nephropathy: Yes  Peripheral neuropathy: No  Peripheral Vascular Disease: No  Retinopathy: No  Sexual dysfunction: Yes    Patient Problem List and Family Medical History reviewed for relevant medical history, current medical status, and diabetes risk factors.    Vitals:  There were no vitals taken for this visit.  Estimated body mass index is 30.72 kg/m  as calculated from the following:    Height as of 2/16/23: 1.753 m (5' 9\").    Weight as of 2/16/23: 94.3 kg (208 lb).   Last 3 BP:   BP Readings from Last 3 Encounters:   02/16/23 (!) 142/86   11/28/22 138/68   10/14/22 126/78       History   Smoking Status     Former     Packs/day: 0.50     Years: 20.00     Types: Cigarettes     Quit date: 1/1/1975   Smokeless Tobacco     Former       Labs:  Lab Results   Component Value Date    A1C 6.4 08/02/2022    A1C 5.9 04/05/2021     Lab Results   Component Value Date     02/14/2023     08/02/2022     06/01/2021     Lab Results   Component Value Date    LDL 58 02/15/2022    LDL 96 04/05/2021     HDL Cholesterol   Date Value Ref Range Status   04/05/2021 57 >39 mg/dL Final     Direct Measure HDL   Date Value Ref Range Status   02/15/2022 33 (L) >=40 mg/dL Final   ]  GFR Estimate   Date Value Ref Range Status   02/14/2023 51 (L) >60 " mL/min/1.73m2 Final     Comment:     eGFR calculated using 2021 CKD-EPI equation.   06/01/2021 43 (L) >60 mL/min/[1.73_m2] Final     Comment:     Non  GFR Calc  Starting 12/18/2018, serum creatinine based estimated GFR (eGFR) will be   calculated using the Chronic Kidney Disease Epidemiology Collaboration   (CKD-EPI) equation.       GFR Estimate If Black   Date Value Ref Range Status   06/01/2021 50 (L) >60 mL/min/[1.73_m2] Final     Comment:      GFR Calc  Starting 12/18/2018, serum creatinine based estimated GFR (eGFR) will be   calculated using the Chronic Kidney Disease Epidemiology Collaboration   (CKD-EPI) equation.       Lab Results   Component Value Date    CR 1.40 02/14/2023    CR 1.51 06/01/2021     No results found for: MICROALBUMIN    Healthy Eating:  Healthy Eating Assessed Today: Yes  Cultural/Yazidism diet restrictions?: No  Meal planning/habits: Avoiding sweets, Low carb, Low salt, Smaller portions  How many times a week on average do you eat food made away from home (restaurant/take-out)?: 4  Meals include: Breakfast, Lunch, Dinner  Beverages: Coffee, Milk, Diet soda, Alcohol    Being Active:  Being Active Assessed Today: Yes  Exercise:: Yes (cardiac rehab)  Days per week of moderate to strenuous exercise (like a brisk walk): (P) 3  On average, minutes per day of exercise at this level: (P) 30  How intense was your typical exercise? : (P) Moderate (like brisk walking)  Exercise Minutes per Week: (P) 90  Barrier to exercise: Physical limitation    Monitoring:  Monitoring Assessed Today: Yes  Did patient bring glucose meter to appointment? : Yes (Professional CGM review)  Blood Glucose Meter: Accu-chek, CGM  Times checking blood sugar at home (number): Never  Blood glucose trend: higher post prandial glucose results    Taking Medications:  Diabetes Medication(s)     Incretin Mimetic Agents       dulaglutide (TRULICITY) 1.5 MG/0.5ML pen    Inject 1.5 mg Subcutaneous  every 7 days          Taking Medication Assessed Today: Yes  Current Treatments: Non-insulin Injectables  Problems taking diabetes medications regularly?: No  Diabetes medication side effects?: No    Problem Solving:  Problem Solving Assessed Today: Yes  Is the patient at risk for hypoglycemia?: No     Reducing Risks:  CAD Risks: Diabetes Mellitus, Dyslipidemia, Hypertension, Obesity  Has dilated eye exam at least once a year?: Yes  Sees dentist every 6 months?: Yes  Feet checked by healthcare provider in the last year?: No    Healthy Coping:  Healthy Coping Assessed Today: Yes  Emotional response to diabetes: Ready to learn  Informal Support system:: Spouse  Stage of change: ACTION (Actively working towards change)  Patient Activation Measure Survey Score:  SANDIE Score (Last Two) 5/17/2011 9/24/2013   SANDIE Raw Score 39 44   Activation Score 56.4 70.8   SANDIE Level 3 4         Care Plan and Education Provided:  Care Plan: Diabetes   Updates made by Molly Fisher since 3/7/2023 12:00 AM      Problem: HbA1C Not In Goal       Goal: Establish Regular Follow-Ups with PCP       Task: Discuss with PCP the recommended timing for patient's next follow up visit(s)    Responsible User: Molly Fisher      Task: Discuss schedule for PCP visits with patient    Responsible User: Molly Fisher      Goal: Get HbA1C Level in Goal       Task: Educate patient on diabetes education self-management topics    Responsible User: Molly Fisher      Task: Educate patient on benefits of regular glucose monitoring    Responsible User: Molly Fisher      Task: Refer patient to appropriate extended care team member, as needed (Medication Therapy Management, Behavioral Health, Physical Therapy, etc.)    Responsible User: Molly Fisher      Task: Discuss diabetes treatment plan with patient    Responsible User: Molly Fisher      Problem: Diabetes Self-Management Education Needed to Optimize Self-Care Behaviors       Goal:  Understand diabetes pathophysiology and disease progression       Task: Provide education on diabetes pathophysiology and disease progression specfic to patient's diabetes type    Responsible User: Molly Fisher      Goal: Healthy Eating - follow a healthy eating pattern for diabetes       Task: Provide education on portion control and consistency in amount, composition and timing of food intake Completed 3/7/2023   Responsible User: Molly Fisher      Task: Provide education on managing carbohydrate intake (carbohydrate counting, plate planning method, etc.)    Responsible User: Molly Fisher      Task: Provide education on weight management    Responsible User: Molly Fisher      Task: Provide education on heart healthy eating    Responsible User: Molly Fisher      Task: Provide education on eating out    Responsible User: Molly Fisher      Task: Develop individualized healthy eating plan with patient    Responsible User: Molly Fisher      Goal: Being Active - get regular physical activity, working up to at least 150 minutes per week       Task: Provide education on relationship of activity to glucose and precautions to take if at risk for low glucose Completed 3/7/2023   Responsible User: Molly Fisher      Task: Discuss barriers to physical activity with patient Completed 3/7/2023   Responsible User: Molly Fisher      Task: Develop physical activity plan with patient    Responsible User: Molly Fisher      Task: Explore community resources including walking groups, assistance programs, and home videos    Responsible User: Molly Fisher      Goal: Monitoring - monitor glucose and ketones as directed       Task: Provide education on blood glucose monitoring (purpose, proper technique, frequency, glucose targets, interpreting results, when to use glucose control solution, sharps disposal) Completed 3/7/2023   Responsible User: Molly Fisher      Task: Provide education on  continuous glucose monitoring (sensor placement, use of oleg or /reader, understanding glucose trends, alerts and alarms, differences between sensor glucose and blood glucose) Completed 3/7/2023   Responsible User: Molly Fisher      Task: Provide education on ketone monitoring (when to monitor, frequency, etc.)    Responsible User: Molly Fisher      Goal: Taking Medication - patient is consistently taking medications as directed    Start Date: 3/7/2023   This Visit's Progress: 0%   Note:    Increase Trulicity to 3.0 mg once weekly as discussed     Task: Provide education on action of prescribed medication, including when to take and possible side effects Completed 3/7/2023   Responsible User: Molly Fisher      Task: Provide education on insulin and injectable diabetes medications, including administration, storage, site selection and rotation for injection sites    Responsible User: Molly Fisher      Task: Discuss barriers to medication adherence with patient and provide management technique ideas as appropriate    Responsible User: Molly Fisher      Task: Provide education on frequency and refill details of medications    Responsible User: Molly Fisher      Goal: Problem Solving - know how to prevent and manage short-term diabetes complications       Task: Provide education on high blood glucose - causes, signs/symptoms, prevention and treatment Completed 3/7/2023   Responsible User: Molly Fisher      Task: Provide education on low blood glucose - causes, signs/symptoms, prevention, treatment, carrying a carbohydrate source at all times, and medical identification    Responsible User: Molly Fisher      Task: Provide education on safe travel with diabetes    Responsible User: Molly Fisher      Task: Provide education on how to care for diabetes on sick days    Responsible User: Molly Fisher      Task: Provide education on when to call a health care provider     Responsible User: Molly Fisher      Goal: Reducing Risks - know how to prevent and treat long-term diabetes complications       Task: Provide education on major complications of diabetes, prevention, early diagnostic measures and treatment of complications    Responsible User: Molly Fisher      Task: Provide education on recommended care for dental, eye and foot health    Responsible User: Molly Fisher      Task: Provide education on Hemoglobin A1c - goals and relationship to blood glucose levels    Responsible User: Molly Fisher      Task: Provide education on recommendations for heart health - lipid levels and goals, blood pressure and goals, and aspirin therapy, if indicated    Responsible User: Molly Fisher      Task: Provide education on tobacco cessation    Responsible User: Molly Fisher      Goal: Healthy Coping - use available resources to cope with the challenges of managing diabetes       Task: Discuss recognizing feelings about having diabetes    Responsible User: Molly Fisher      Task: Provide education on the benefits of making appropriate lifestyle changes Completed 3/7/2023   Responsible User: Molly Fisher      Task: Provide education on benefits of utilizing support systems    Responsible User: Molly Fisher      Task: Discuss methods for coping with stress    Responsible User: Molly Fisher      Task: Provide education on when to seek professional counseling    Responsible User: Molly Fisher RDN, LD, [facility]    Time Spent: 40 minutes  Encounter Type: Individual    Any diabetes medication dose changes were made via the CDE Protocol per the patient's referring provider. A copy of this encounter was shared with the provider.

## 2023-03-08 ENCOUNTER — HOSPITAL ENCOUNTER (OUTPATIENT)
Dept: CARDIAC REHAB | Facility: CLINIC | Age: 83
Discharge: HOME | End: 2023-03-08
Attending: INTERNAL MEDICINE
Payer: COMMERCIAL

## 2023-03-08 PROCEDURE — 93798 PHYS/QHP OP CAR RHAB W/ECG: CPT | Performed by: REHABILITATION PRACTITIONER

## 2023-03-13 ENCOUNTER — HOSPITAL ENCOUNTER (OUTPATIENT)
Dept: CARDIAC REHAB | Facility: CLINIC | Age: 83
Discharge: HOME | End: 2023-03-13
Attending: INTERNAL MEDICINE
Payer: COMMERCIAL

## 2023-03-13 PROCEDURE — 93798 PHYS/QHP OP CAR RHAB W/ECG: CPT

## 2023-03-15 ENCOUNTER — HOSPITAL ENCOUNTER (OUTPATIENT)
Dept: CARDIAC REHAB | Facility: CLINIC | Age: 83
Discharge: HOME | End: 2023-03-15
Attending: INTERNAL MEDICINE
Payer: COMMERCIAL

## 2023-03-15 PROCEDURE — 93798 PHYS/QHP OP CAR RHAB W/ECG: CPT

## 2023-03-20 ENCOUNTER — HOSPITAL ENCOUNTER (OUTPATIENT)
Dept: CARDIAC REHAB | Facility: CLINIC | Age: 83
Discharge: HOME | End: 2023-03-20
Attending: INTERNAL MEDICINE
Payer: COMMERCIAL

## 2023-03-20 PROCEDURE — 93798 PHYS/QHP OP CAR RHAB W/ECG: CPT | Performed by: OCCUPATIONAL THERAPIST

## 2023-03-22 ENCOUNTER — HOSPITAL ENCOUNTER (OUTPATIENT)
Dept: CARDIAC REHAB | Facility: CLINIC | Age: 83
Discharge: HOME | End: 2023-03-22
Attending: INTERNAL MEDICINE
Payer: COMMERCIAL

## 2023-03-22 VITALS — HEIGHT: 69 IN | WEIGHT: 208 LBS | BODY MASS INDEX: 30.81 KG/M2

## 2023-03-22 PROCEDURE — 93797 PHYS/QHP OP CAR RHAB WO ECG: CPT | Performed by: REHABILITATION PRACTITIONER

## 2023-03-22 PROCEDURE — 93798 PHYS/QHP OP CAR RHAB W/ECG: CPT | Performed by: REHABILITATION PRACTITIONER

## 2023-03-22 NOTE — PROGRESS NOTES
"NUTRITION ASSESSMENT & EDUCATION NOTE    REASON FOR ASSESSMENT  Austin Garza is a 82 year old male seen by Registered Dietitian for 1:1 Cardiac Rehab consult     NUTRITION HISTORY    Information obtained from patient, chart    Patient has been instructed to follow low carbohydrate diet    Previous diet education: when initially diagnosed, low carb, no sugar (uses splenda) does not keep pastries in the house    Patient has attended no nutrition classes offered by cardiac rehab, Pulmonary Nutrition    Nutrition-related goals: none    Grocery shopping, cooking: patient and wife shops together, patient and wife cook    Frequency of eating out: once every couple months for dinner, for lunch share a meal and will go 6 days per week in summer 4 days per week otherwise    Economic factors: no    Typical intake as follows:   - Breakfast: English Muffin w/ \"fake butter\" yogurt based butter and no sugar strawberry jelly or 1/2 bagel w/ \"fake butter\" and low fat cream cheese and a cup of coffee with splenda and sugar free creamer   - Lunch: soup at home on days at cardiac rehab, at [employer] will share a BLT and cup of soup, will go to a diner and share a mushroom swiss hamburger or cheese burger   - Dinner: Varies-salads, stoffers lasagna, broccoli, little bit of chicken, very little red meat, will go several months without eating steak or lamb chops. When at [employer] (Prime Rib, crab legs) for special occasions once every couple months.   - Snacks: 1/2 banana, fresh vegetables, 1/2 kowalskis cookie either as am snack or pm snack and 1% milk   - Fluids: keeps hydrated through milk, doesn't like the Jaspal water      Changes to diet since cardiac event: none    Barriers to dietary changes: different dietary needs from wife    ANTHROPOMETRICS  Height: 5' 9\"  Weight: 208 lbs 0 oz  Body mass index is 30.72 kg/m .  Weight Status:  Obesity Grade I BMI 30-34.9  IBW: 70.7kg  % IBW: 133%  Weight History:   Wt Readings from Last 30 " Encounters:   03/22/23 94.3 kg (208 lb)   02/16/23 94.3 kg (208 lb)   11/28/22 96.6 kg (213 lb)   10/14/22 94.8 kg (208 lb 14.4 oz)   09/23/22 96 kg (211 lb 9.6 oz)   08/02/22 95.6 kg (210 lb 11.2 oz)   07/12/22 96.2 kg (212 lb)   07/08/22 96.3 kg (212 lb 6.4 oz)   06/22/22 95.3 kg (210 lb)   06/15/22 95.8 kg (211 lb 4.8 oz)   06/09/22 95.7 kg (211 lb)   03/15/22 98.3 kg (216 lb 11.2 oz)   03/10/22 97.3 kg (214 lb 8 oz)   02/15/22 97.1 kg (214 lb)   12/07/21 94.8 kg (209 lb)   11/24/21 95.4 kg (210 lb 6.4 oz)   10/06/21 93 kg (205 lb)   09/20/21 94.6 kg (208 lb 9.6 oz)   09/17/21 94.3 kg (208 lb)   09/15/21 89.8 kg (198 lb)   09/13/21 93.9 kg (207 lb)   09/10/21 93 kg (205 lb)   09/02/21 95.7 kg (211 lb)   08/31/21 95.3 kg (210 lb 3.2 oz)   08/24/21 94.8 kg (209 lb)   08/18/21 97.6 kg (215 lb 1.6 oz)   08/06/21 97.8 kg (215 lb 8 oz)   07/30/21 94.6 kg (208 lb 8.9 oz)   07/15/21 98 kg (216 lb)   07/12/21 96.9 kg (213 lb 11.2 oz)         ASSESSED NUTRITION NEEDS PER APPROVED PRACTICE GUIDELINES:  Estimated Energy Needs: 4498-7389 kcals ([place] Jeor)  Justification: maintenance  Estimated Protein Needs: 61-77 grams protein (0.8-1 g pro/Kg)  Justification: maintenance  Estimated Fluid Needs: 8027-1545  mL (25-30 mL/kg)  Justification: maintenance    NUTRITION DIAGNOSIS  Food- and nutrition- related knowledge deficit related to CAD as evidenced by HDL 33, high saturated fat food choices, high sodium containing foods    INTERVENTIONS  Nutrition Prescription:  Cardiac diet:   Low saturated fat, Na <2000 mg  Fiber >25 g per day  Emphasis on plate method for balanced meals     Implementation    Assessed learning needs and learning preference.    Nutrition Education (Content):  a) Provided handouts:  a. Consistent Carbohydrate Counting for People with Diabetes  b. Low sodium Nutrition Therapy  c. MyPlate for Meal Planning (2022)  b) Discussed heart healthy diet recommendations, including label reading, minimizing added  salts/saturated fats/sugars, balanced meals TID, at least two food groups per snack, heart healthy substitutes, eliminating all trans fat, sugar free beverages    Nutrition Education (Application):  a) Discussed eating habits and recommended alternative food choices:  a. Emphasized fruits, vegetables, low sodium nuts/heart healthy fats, fish, low fat dairy  b. Reviewed recipes and new food ideas such as beans, lentils, vegetables and tofu for an increase in plant based options   c. Discussed cooking more from scratch to monitor meal ingredients, portions, menu choices     Goals/Follow up/Monitoring For Cardiac Rehab Staff    Adherence to nutrition related recommendations, follow with cardiac rehab    3-4 carb choices per meal, have more healthy fats or lean protein with carbs, < 2000 mg sodium per day    Additional questions/concerns related to heart healthy guidelines      Evans Nieves RDN,BOBY,[facility]  Office: [phone]    DIRECT PATIENT CARE TIME: 55 MINUTES

## 2023-03-27 ENCOUNTER — HOSPITAL ENCOUNTER (OUTPATIENT)
Dept: CARDIAC REHAB | Facility: CLINIC | Age: 83
Discharge: HOME | End: 2023-03-27
Attending: INTERNAL MEDICINE
Payer: COMMERCIAL

## 2023-03-27 PROCEDURE — 93798 PHYS/QHP OP CAR RHAB W/ECG: CPT | Performed by: REHABILITATION PRACTITIONER

## 2023-03-29 ENCOUNTER — HOSPITAL ENCOUNTER (OUTPATIENT)
Dept: CARDIAC REHAB | Facility: CLINIC | Age: 83
Discharge: HOME | End: 2023-03-29
Attending: INTERNAL MEDICINE
Payer: COMMERCIAL

## 2023-03-29 PROCEDURE — 93798 PHYS/QHP OP CAR RHAB W/ECG: CPT

## 2023-03-30 ENCOUNTER — TELEPHONE (OUTPATIENT)
Dept: OPTOMETRY | Facility: CLINIC | Age: 83
End: 2023-03-30
Payer: COMMERCIAL

## 2023-03-30 NOTE — TELEPHONE ENCOUNTER
[facility] Call Center    Phone Message    May a detailed message be left on voicemail: yes     Reason for Call: Other: Patient was seen in January with Dr Allen and was informed that he needed a cataract eval. Patient declined an apt at our clinics and would like Dr Allen to fax a referral to [facility] in [place]. Please fax referral to [phone] and call patient back at [phone]. Thank you.     Action Taken: Message routed to:  Other: [facility]    Travel Screening: Not Applicable

## 2023-04-03 ENCOUNTER — HOSPITAL ENCOUNTER (OUTPATIENT)
Dept: CARDIAC REHAB | Facility: CLINIC | Age: 83
Discharge: HOME | End: 2023-04-03
Attending: INTERNAL MEDICINE
Payer: COMMERCIAL

## 2023-04-03 PROCEDURE — 93798 PHYS/QHP OP CAR RHAB W/ECG: CPT | Performed by: REHABILITATION PRACTITIONER

## 2023-04-05 ENCOUNTER — HOSPITAL ENCOUNTER (OUTPATIENT)
Dept: CARDIAC REHAB | Facility: CLINIC | Age: 83
Discharge: HOME | End: 2023-04-05
Attending: INTERNAL MEDICINE
Payer: COMMERCIAL

## 2023-04-05 PROCEDURE — 93798 PHYS/QHP OP CAR RHAB W/ECG: CPT | Performed by: REHABILITATION PRACTITIONER

## 2023-04-09 ENCOUNTER — HEALTH MAINTENANCE LETTER (OUTPATIENT)
Age: 83
End: 2023-04-09

## 2023-04-10 ENCOUNTER — HOSPITAL ENCOUNTER (OUTPATIENT)
Dept: CARDIAC REHAB | Facility: CLINIC | Age: 83
Discharge: HOME | End: 2023-04-10
Attending: INTERNAL MEDICINE
Payer: COMMERCIAL

## 2023-04-10 PROCEDURE — 93798 PHYS/QHP OP CAR RHAB W/ECG: CPT

## 2023-04-12 ENCOUNTER — HOSPITAL ENCOUNTER (OUTPATIENT)
Dept: CARDIAC REHAB | Facility: CLINIC | Age: 83
Discharge: HOME | End: 2023-04-12
Attending: INTERNAL MEDICINE
Payer: COMMERCIAL

## 2023-04-12 PROCEDURE — 93798 PHYS/QHP OP CAR RHAB W/ECG: CPT

## 2023-04-17 ENCOUNTER — HOSPITAL ENCOUNTER (OUTPATIENT)
Dept: CARDIAC REHAB | Facility: CLINIC | Age: 83
Discharge: HOME | End: 2023-04-17
Attending: INTERNAL MEDICINE
Payer: COMMERCIAL

## 2023-04-17 PROCEDURE — 93798 PHYS/QHP OP CAR RHAB W/ECG: CPT | Performed by: REHABILITATION PRACTITIONER

## 2023-04-18 ENCOUNTER — ALLIED HEALTH/NURSE VISIT (OUTPATIENT)
Dept: EDUCATION SERVICES | Facility: CLINIC | Age: 83
End: 2023-04-18
Payer: COMMERCIAL

## 2023-04-18 DIAGNOSIS — E11.69 TYPE 2 DIABETES MELLITUS WITH OTHER SPECIFIED COMPLICATION, WITH LONG-TERM CURRENT USE OF INSULIN (H): Primary | ICD-10-CM

## 2023-04-18 DIAGNOSIS — Z79.4 TYPE 2 DIABETES MELLITUS WITH OTHER SPECIFIED COMPLICATION, WITH LONG-TERM CURRENT USE OF INSULIN (H): Primary | ICD-10-CM

## 2023-04-18 PROCEDURE — G0108 DIAB MANAGE TRN  PER INDIV: HCPCS

## 2023-04-18 NOTE — LETTER
4/18/2023         RE: Austni Garza  [address] [place] [address]        Dear Colleague,    Thank you for referring your patient, Austin Garza, to the [facility]. Please see a copy of my visit note below.    Benji has a 2 month supply of 1.5 mg Trulicity dose. He is agreeable to injecting one dose on each side of abdomen (total 3.0 mg) to use supplies in the next month. He will continue to rotate injection sites.  He would like to use Carlita Pro next month to determine if increased Trulicity dose has improved BG.   Placement and Follow Up Carlita Pro appt's scheduled with diabetes educator in [place] in April.  Trulicity prescription updated to 3.0 mg once weekly.  Enc to test BG fasting and rotate after meal BG check before next diabetes appt.      Follow-up: April 4 and April 18th  * He may want to pay out of pocket for personal CGM. Did not have phone at education appt today. Continue conversation at next diabetes appt.     [facility] on [address]    Reach out o pharmacist    Postural hypotension    Lab Results   Component Value Date    A1C 6.4 08/02/2022    A1C 6.1 06/22/2022    A1C 6.2 02/15/2022    A1C 7.0 07/30/2021    A1C 5.9 04/05/2021    A1C 6.7 01/15/2021    A1C 5.6 08/23/2020    A1C 5.5 07/14/2020    A1C 6.3 12/31/2019     6.1 in Nov at [place]    Goes to the [facility]    Hot feet in bed at night-very sensitive

## 2023-04-18 NOTE — PATIENT INSTRUCTIONS
MY DIABETES TODAY:    1)  Goal A1C is under <7.0  Mine is:      Lab Results   Component Value Date    A1C 6.4 08/02/2022    A1C 5.9 04/05/2021       2)  Goal LDL (bad cholesterol) under 70  (measured at least yearly)- I am currently at:   Lab Results   Component Value Date    LDL 58 02/15/2022    LDL 96 04/05/2021       3)  Goal blood pressure under 130/80- mine was Data Unavailable today    Care Plan:  Blood sugar testing: Continue testing BG's about once a day  Continue Trulicity 3mg weekly     Follow up:  Dr. Kauffman can decide if trulicity should be increased to 4.5mg after your appt with her at the end of May.  Diabetes Education appts as needed.     Bring blood glucose meter and logbook with you to all doctor and follow-up appointments.     [place] [facility] for the [facility]:  For Your Diabetes or Nutrition Education Appointments Call:  [phone]   For Diabetes or Nutrition Related Questions:   [phone]  Send [employer] [facility] Message   If you need a medication refill please contact your pharmacy. Please allow 3 business days for your refills to be completed.

## 2023-04-18 NOTE — PROGRESS NOTES
Diabetes Self-Management Education & Support    Presents for: Individual review    Type of Service: In Person Visit    Assessment Type:   ASSESSMENT:  I met with Benji today with plans of placing another eusebio pro CGM, but he would like to wait on that for now as he has only been on the 3mg dose of trulicity for a few weeks. Most BG's are still above goal, but do appear to be coming down a little this past week. He reports that he had an A1c drawn in November at [facility] when he was there for his heart stents procedure and it was 6.1%. Discussed waiting to see how BG's are and his next A1C before increasing to the 4.5mg trulicity dose as he has a PCP appt at the end of May. Overall he reports that things are going well.    Patient's most recent   Lab Results   Component Value Date    A1C 6.4 08/02/2022    A1C 5.9 04/05/2021     is meeting goal of <7.0    Diabetes knowledge and skills assessment:   Patient is knowledgeable in diabetes management concepts related to: Healthy Eating, Being Active, Monitoring, Taking Medication, Reducing Risks and Healthy Coping    Continue education with the following diabetes management concepts: Monitoring, Taking Medication and Problem Solving    Based on learning assessment above, most appropriate setting for further diabetes education would be: Individual setting.      PLAN  -Blood sugar testing: Continue testing BG's about once a day  -Continue Trulicity 3mg weekly   -Dr. Kauffman can decide if trulicity should be increased to 4.5mg after your appt with her at the end of May.    Topics to cover at upcoming visits: Monitoring, Problem Solving and Reducing Risks    Follow-up: None planned at this time.    See Care Plan for co-developed, patient-state behavior change goals.  AVS provided for patient today.    Education Materials Provided:  No new materials provided today      SUBJECTIVE/OBJECTIVE:  Presents for: Individual review  Accompanied by: Self  Diabetes education in the past  "24mo: Yes  Focus of Visit: Monitoring  Diabetes type: Type 2  Disease course: Stable  How confident are you filling out medical forms by yourself:: Extremely  Transportation concerns: No  Difficulty affording diabetes medication?: No  Difficulty affording diabetes testing supplies?: No  Other concerns:: None  Cultural Influences/Ethnic Background:  Not  or       Diabetes Symptoms & Complications:  Fatigue: Yes  Neuropathy: Yes  Polydipsia: No  Polyphagia: No  Polyuria: No  Visual change: Sometimes  Slow healing wounds: Sometimes  Weight trend: Decreasing  Complications assessed today?: No  Autonomic neuropathy: No  CVA: No  Heart disease: Yes  Nephropathy: Yes  Peripheral neuropathy: No  Peripheral Vascular Disease: No  Retinopathy: No  Sexual dysfunction: Yes    Patient Problem List and Family Medical History reviewed for relevant medical history, current medical status, and diabetes risk factors.    Vitals:  There were no vitals taken for this visit.  Estimated body mass index is 30.72 kg/m  as calculated from the following:    Height as of 3/22/23: 1.753 m (5' 9\").    Weight as of 3/22/23: 94.3 kg (208 lb).   Last 3 BP:   BP Readings from Last 3 Encounters:   02/16/23 (!) 142/86   11/28/22 138/68   10/14/22 126/78       History   Smoking Status     Former     Packs/day: 0.50     Years: 20.00     Types: Cigarettes     Quit date: 1/1/1975   Smokeless Tobacco     Former       Labs:  Lab Results   Component Value Date    A1C 6.4 08/02/2022    A1C 5.9 04/05/2021     Lab Results   Component Value Date     02/14/2023     08/02/2022     06/01/2021     Lab Results   Component Value Date    LDL 58 02/15/2022    LDL 96 04/05/2021     HDL Cholesterol   Date Value Ref Range Status   04/05/2021 57 >39 mg/dL Final     Direct Measure HDL   Date Value Ref Range Status   02/15/2022 33 (L) >=40 mg/dL Final   ]  GFR Estimate   Date Value Ref Range Status   02/14/2023 51 (L) >60 mL/min/1.73m2 Final "     Comment:     eGFR calculated using 2021 CKD-EPI equation.   06/01/2021 43 (L) >60 mL/min/[1.73_m2] Final     Comment:     Non  GFR Calc  Starting 12/18/2018, serum creatinine based estimated GFR (eGFR) will be   calculated using the Chronic Kidney Disease Epidemiology Collaboration   (CKD-EPI) equation.       GFR Estimate If Black   Date Value Ref Range Status   06/01/2021 50 (L) >60 mL/min/[1.73_m2] Final     Comment:      GFR Calc  Starting 12/18/2018, serum creatinine based estimated GFR (eGFR) will be   calculated using the Chronic Kidney Disease Epidemiology Collaboration   (CKD-EPI) equation.       Lab Results   Component Value Date    CR 1.40 02/14/2023    CR 1.51 06/01/2021     No results found for: MICROALBUMIN    Healthy Eating:  Healthy Eating Assessed Today: Yes  Cultural/Gnosticist diet restrictions?: No  Meal planning/habits: Avoiding sweets, Low carb, Low salt, Smaller portions  How many times a week on average do you eat food made away from home (restaurant/take-out)?: 4  Meals include: Breakfast, Lunch, Dinner  Beverages: Coffee, Milk, Diet soda, Alcohol    Being Active:  Being Active Assessed Today: Yes  Exercise:: Yes (cardiac rehab)  Days per week of moderate to strenuous exercise (like a brisk walk): 3  On average, minutes per day of exercise at this level: 30  How intense was your typical exercise? : Moderate (like brisk walking)  Exercise Minutes per Week: 90  Barrier to exercise: Physical limitation    Monitoring:  Monitoring Assessed Today: Yes  Did patient bring glucose meter to appointment? : Yes (Brought in BG records)  Blood Glucose Meter: Accu-chek  Times checking blood sugar at home (number): 1  Times checking blood sugar at home (per): Day  Blood glucose trend: Decreasing        Taking Medications:  Diabetes Medication(s)     Incretin Mimetic Agents       Dulaglutide (TRULICITY) 3 MG/0.5ML SOPN    Inject 3 mg Subcutaneous every 7 days          Taking  Medication Assessed Today: Yes  Current Treatments: Non-insulin Injectables  Problems taking diabetes medications regularly?: No  Diabetes medication side effects?: No    Problem Solving:  Problem Solving Assessed Today: Yes  Is the patient at risk for hypoglycemia?: No  Is the patient at risk for DKA?: No              Reducing Risks:  Reducing Risks Assessed Today: Yes  CAD Risks: Diabetes Mellitus, Dyslipidemia, Hypertension, Obesity  Has dilated eye exam at least once a year?: Yes (cataract)  Sees dentist every 6 months?: Yes  Feet checked by healthcare provider in the last year?: No    Healthy Coping:  Healthy Coping Assessed Today: Yes  Emotional response to diabetes: Ready to learn, Confidence diabetes can be controlled  Informal Support system:: Spouse  Stage of change: ACTION (Actively working towards change)  Patient Activation Measure Survey Score:      5/17/2011     8:00 AM 9/24/2013     1:00 PM   SANDIE Score (Last Two)   SANDIE Raw Score 39 44   Activation Score 56.4 70.8   SANDIE Level 3 4         Care Plan and Education Provided:  Care Plan: Diabetes   Updates made by Fanny Roberts RN since 4/25/2023 12:00 AM      Problem: HbA1C Not In Goal       Goal: Establish Regular Follow-Ups with PCP       Task: Discuss schedule for PCP visits with patient Completed 4/25/2023   Responsible User: Molly Fisher      Goal: Get HbA1C Level in Goal       Task: Educate patient on diabetes education self-management topics Completed 4/25/2023   Responsible User: Molly Fisher      Task: Educate patient on benefits of regular glucose monitoring Completed 4/25/2023   Responsible User: Molly Fisher      Task: Discuss diabetes treatment plan with patient Completed 4/25/2023   Responsible User: Molly Fisher      Problem: Diabetes Self-Management Education Needed to Optimize Self-Care Behaviors       Goal: Taking Medication - patient is consistently taking medications as directed    Start Date: 3/7/2023   This Visit's  Progress: 100%   Recent Progress: 0%   Note:    Increase Trulicity to 3.0 mg once weekly as discussed     Goal: Reducing Risks - know how to prevent and treat long-term diabetes complications       Task: Provide education on Hemoglobin A1c - goals and relationship to blood glucose levels Completed 4/25/2023   Responsible User: Molly Fisher RN, [facility]      Time Spent: 60 minutes  Encounter Type: Individual    Any diabetes medication dose changes were made via the CDE Protocol per the patient's referring provider and primary care provider. A copy of this encounter was shared with the provider.

## 2023-04-25 ENCOUNTER — HOSPITAL ENCOUNTER (OUTPATIENT)
Dept: CARDIAC REHAB | Facility: CLINIC | Age: 83
Discharge: HOME | End: 2023-04-25
Attending: INTERNAL MEDICINE
Payer: COMMERCIAL

## 2023-04-25 PROCEDURE — 93797 PHYS/QHP OP CAR RHAB WO ECG: CPT

## 2023-04-25 PROCEDURE — 93798 PHYS/QHP OP CAR RHAB W/ECG: CPT

## 2023-05-09 NOTE — TELEPHONE ENCOUNTER
Routing refill request to provider for review/approval because:  LABS.     Next 5 appointments (look out 90 days)      May 30, 2023 10:00 AM  (Arrive by 9:40 AM)  Welcome to Medicare Visit with Luma Kauffman MD  [facility] ([facility] ) [address] [place] [address]  [address]  [facility] [address]  [phone]     Jul 11, 2023 10:00 AM  (Arrive by 9:40 AM)  Provider Visit with Luma Kauffman MD  [facility] ([facility] ) [address] [place] [address]  [address]  [facility] [id number] [phone]          Kimberley LANDRY RN

## 2023-05-15 NOTE — PROGRESS NOTES
Labs drawn without difficulty. Patient discharged in stable condition  Nicci Hackett RN on 5/15/2023 at 9:25 AM

## 2023-05-16 NOTE — PROGRESS NOTES
[facility]    Hematology/Oncology Established Patient Note      Today's Date: 5/17/23    Reason for Follow-up: CLL      HISTORY OF PRESENT ILLNESS: Austin Garza is an 82 year old male with CLL previously on ibrutinib and followed by Dr. Perry who has since relocated.     He has PMHx of DMII, HTN who presented with leukocytosis.  In November 2017, he was found to have elevated WBC of 61.7K, hemoglobin of 10.4, and platelet of 115K.  There were no other CBC results available between 2010 and 2017.  Prior to 2010, he had normal CBC, with normal to mild anemia, and mild thrombocytopenia.   He was asymptomatic.    He underwent bone marrow biopsy on 11/21/17, which was consistent with chronic lymphocytic leukemia/small lymphocytic lymphoma, with 13% ringed sideroblasts identified.  The presence of increased numbers of ringed sideroblasts raises the possibility of an associated myelodysplastic syndrome.  Dysplastic changes are not identified though.  Many cases exhibiting ringed sideroblasts are metabolic origin, without significant risk of progression to acute leukemia, and these typically do not show dysplastic morphology as is the case with this specimen.  15% ringed sideroblasts are required for a diagnosis for RARS, which this specimen does not meet.  Flow cytometry is also consistent with CLL/SLL.  Cytogenetics show two (10%) of the metaphase cells comprise a clone characterized by a deletion within the proximal long arm of a chromosome 13 as the sole karyotypic abnormality.  Three (15%) metaphases had loss of the Y chromosome as the sole abnormality.    CT scan on 11/29/17 shows mildly enlarged left axillary lymph node and periaortic lymph nodes in the retroperitoneum of the abdomen.  Spleen is also enlarged.    On his staging CT scan for CLL, he was incidentally found to have a large infrarenal abdominal aortic aneurysm, measuring 7.6 cm.  He was seen by Dr. Thomas, and he underwent EVAR  on 12/4/17.     He started ibrutinib on 5/4/20.  It was held 5/28/20-6/4/20 for tooth extraction.    He was hospitalized on 7/30/2021 for shortness of breath, new CHF, A. fib with RVR and ibrutinib was put on hold.  He was seen by cardiology and eventually underwent cardioversion for the A. fib.  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  He was started on Eliquis and metoprolol.  His Plavix was stopped.    He went to the [facility] ED on 9/26/21 and found to have pericardial effusion.  Due to lack of beds, [facility] was [facility] acute pericardial effusion and pericarditis.  He was started on colchicine and prednisone taper x 6 weeks.  He was also given Bactrim for PJP prophylaxis due duration of prednisone course.  He was discharged on 10/1/21.      Since his last visit in clinic, he states he has had multiple hospitalizations for back pain. He informs me he has cardiology follow up as he was discovered to have coronary artery disease. He has had angiogram. There are recommendations in regards to CABG vs PCI. He states he is uncertain if he will move forward with this. He will be seen in 11/3/22.      INTERIM HISTORY:   No acute events. Still seeing derm. No issues with bleed. He remains on plavix.       REVIEW OF SYSTEMS:   14 point ROS was reviewed and is negative other than as noted above in HPI.       HOME MEDICATIONS:  Current Outpatient Medications   Medication Sig Dispense Refill     amLODIPine (NORVASC) 5 MG tablet Take 1.5 tablets (7.5 mg) by mouth daily 135 tablet 3     aspirin (ASA) 81 MG EC tablet Take 81 mg by mouth daily       blood glucose (CONTOUR NEXT TEST) test strip Use to test blood sugar 3-4 times daily or as directed. 100 strip 6     blood glucose monitoring (NO BRAND SPECIFIED) meter device kit Use to test blood sugar 2 times daily or as directed. 1 kit 0     clopidogrel (PLAVIX) 75 MG tablet Take 1 tablet (75 mg) by mouth daily 90 tablet 4      "Dulaglutide (TRULICITY) 3 MG/0.5ML SOPN Inject 3 mg Subcutaneous every 7 days 6 mL 3     metoprolol succinate ER (TOPROL XL) 100 MG 24 hr tablet Take 1 tablet (100 mg) by mouth daily 90 tablet 0     nitroGLYcerin (NITROSTAT) 0.4 MG sublingual tablet One tablet under the tongue every 5 minutes if needed for chest pain. May repeat every 5 minutes for a maximum of 3 doses in 15 minutes\" 25 tablet 3     rosuvastatin (CRESTOR) 20 MG tablet Take 1 tablet (20 mg) by mouth daily 90 tablet 3     triamcinolone (KENALOG) 0.1 % external ointment 1 APPLICATION TWICE A DAY TOPICALLY TO ITCHY AREAS ON BACK FOR 2 WEEKS. REPEAT WITH FLARES.           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      CLL (chronic lymphocytic leukemia)      Diaphragmatic hernia      Diverticulitis of colon      Esophageal reflux      Essential hypertension      Hyperlipidemia      Irritable bowel syndrome      Macular degeneration (senile) of retina      Mumps      Squamous Cell Carcinoma, Back 1988     Type 2 diabetes mellitus          PAST SURGICAL HISTORY:  Past Surgical History:   Procedure Laterality Date     ANESTHESIA CARDIOVERSION N/A 8/2/2021    Procedure: ANESTHESIA, FOR CARDIOVERSION;  Surgeon: GENERIC ANESTHESIA PROVIDER;  Location: [facility] OR     APPENDECTOMY OPEN  1966     BONE MARROW BIOPSY, BONE SPECIMEN, NEEDLE/TROCAR N/A 11/21/2017    Procedure: BIOPSY BONE MARROW;  BONE MARROW BIOPSY;  Surgeon: Shady Garcia MD;  Location:  GI     COLONOSCOPY  2002     CV CORONARY ANGIOGRAM N/A 6/29/2022    Procedure: Coronary Angiogram;  Surgeon: Evaristo Beaulieu MD;  Location:  HEART CARDIAC CATH LAB     ENDOVASCULAR REPAIR ANEURYSM ABDOMINAL AORTA N/A 12/4/2017    Procedure: ENDOVASCULAR REPAIR ANEURYSM ABDOMINAL AORTA;  ENDOVASCULAR REPAIR " ANEURYSM ABDOMINAL AORTA;  Surgeon: Milton Cazares MD;  Location: [facility] OR     Fistulotomy with marsupialization for repair of fisture in ano       IR ABDOMINAL AORTOGRAM  3/11/2019     IR VISCERAL EMBOLIZATION  2019     Multiple skin tags - resection  1998     Squamous cell skin cancer resection - back       VASECTOMY           SOCIAL HISTORY:  Social History     Socioeconomic History     Marital status:      Spouse name: librado     Number of children: 0     Years of education: 17     Highest education level: Not on file   Occupational History     Occupation: retired   Tobacco Use     Smoking status: Former     Packs/day: 0.50     Years: 20.00     Pack years: 10.00     Types: Cigarettes     Quit date: 1975     Years since quittin.4     Smokeless tobacco: Former   Vaping Use     Vaping status: Not on file   Substance and Sexual Activity     Alcohol use: Yes     Alcohol/week: 10.0 - 14.0 standard drinks of alcohol     Types: 10 - 14 Standard drinks or equivalent per week     Comment: 3 drinks a day/wine     Drug use: No     Sexual activity: Never   Other Topics Concern     Parent/sibling w/ CABG, MI or angioplasty before 65F 55M? Not Asked   Social History Narrative     Not on file     Social Determinants of Health     Financial Resource Strain: Not on file   Food Insecurity: Not on file   Transportation Needs: Not on file   Physical Activity: Not on file   Stress: Not on file   Social Connections: Not on file   Intimate Partner Violence: Not At Risk (2023)    Humiliation, Afraid, Rape, and Kick questionnaire      Fear of Current or Ex-Partner: No      Emotionally Abused: No      Physically Abused: No      Sexually Abused: No   Housing Stability: Not on file   He quit smoking in .  He drinks 1-3 glasses of wine a night with dinner.  He is retired, and used to work as a .  [place]  His cousin had lung cancer and  around age 69.  Otherwise,  he denies family history of cancer.        FAMILY HISTORY:  Family History   Problem Relation Age of Onset     Cerebrovascular Disease Father         x 2     Hypertension Mother      C.A.D. Mother      Cancer Mother         skin     Eye Disorder Mother         glaucoma     Prostate Cancer No family hx of      Cancer - colorectal No family hx of      Glaucoma No family hx of      Macular Degeneration No family hx of          PHYSICAL EXAM:  /81   Pulse 88   Temp 97.4  F (36.3  C) (Tympanic)   Resp 16   Wt 93 kg (205 lb 1.6 oz)   SpO2 98%   BMI 30.29 kg/m    ECO  HEENT: Large bandage of the upper lip. He has ecchymosis of the oral labial fold into chin and upper cervical area. He has tenderness of the sinus.   GENERAL/CONSTITUTIONAL: No acute distress.  EYES: No scleral icterus.  NEUROLOGIC: Alert, oriented, answers questions appropriately.  INTEGUMENTARY: No jaundice.    GAIT: Steady, does not use assistive device      LABS:   Latest Reference Range & Units 05/15/23 09:23   Sodium 136 - 145 mmol/L 138   Potassium 3.4 - 5.3 mmol/L 4.3   Chloride 98 - 107 mmol/L 104   Carbon Dioxide (CO2) 22 - 29 mmol/L 24   Urea Nitrogen 8.0 - 23.0 mg/dL 18.8   Creatinine 0.67 - 1.17 mg/dL 1.43 (H)   GFR Estimate >60 mL/min/1.73m2 50 (L)   Calcium 8.8 - 10.2 mg/dL 8.6 (L)   Anion Gap 7 - 15 mmol/L 10   Albumin 3.5 - 5.2 g/dL 3.9   Protein Total 6.4 - 8.3 g/dL 6.3 (L)   Alkaline Phosphatase 40 - 129 U/L 65   ALT 10 - 50 U/L 26   AST 10 - 50 U/L 24   Bilirubin Total <=1.2 mg/dL 0.5   Glucose 70 - 99 mg/dL 203 (H)   Lactate Dehydrogenase 0 - 250 U/L 162   WBC 4.0 - 11.0 10e3/uL 7.9   Hemoglobin 13.3 - 17.7 g/dL 11.4 (L)   Hematocrit 40.0 - 53.0 % 33.8 (L)   Platelet Count 150 - 450 10e3/uL 67 (L)   RBC Count 4.40 - 5.90 10e6/uL 3.74 (L)   MCV 78 - 100 fL 90   MCH 26.5 - 33.0 pg 30.5   MCHC 31.5 - 36.5 g/dL 33.7   RDW 10.0 - 15.0 % 14.4   % Neutrophils % 37   % Lymphocytes % 56   % Monocytes % 5   % Eosinophils % 2   %  Basophils % 0   Absolute Basophils 0.0 - 0.2 10e3/uL 0.0   Absolute Eosinophils 0.0 - 0.7 10e3/uL 0.2   Absolute Immature Granulocytes <=0.4 10e3/uL 0.0   Absolute Lymphocytes 0.8 - 5.3 10e3/uL 4.4   Absolute Monocytes 0.0 - 1.3 10e3/uL 0.4   % Immature Granulocytes % 0   Absolute Neutrophils 1.6 - 8.3 10e3/uL 2.9   Absolute NRBCs 10e3/uL 0.0   NRBCs per 100 WBC <1 /100 0       ASSESSMENT/PLAN:  Austin Garza is an 82 year old male with:    1) CLL/SLL: BLACKWOOD stage III, with lymphocytosis, lymphadenopathy, splenomegaly, and anemia.  He has mild thrombocytopenia as well, but is above 100K.  He presented with leukocytosis with WBC of 61.7K, hemoglobin of 10.4, and platelet count of 115K on diagnosis.  Bone marrow biopsy and flow cytometry confirmed CLL/SLL.  CT scan shows mildly enlarged left axillary lymph node and periaortic lymph nodes in the retroperitoneum of the abdomen, with enlarged spleen.      Due to worsening lymphocyte count, anemia, thrombocytopenia, as well as fatigue and night sweats, he started ibrutinib on 5/4/20.      His WBC normalized, with stable hemoglobin and platelet count.    In light of his recent issues with atrial fibrillation and now on Eliquis, his ibrutinib has been on hold since August 2021.  Considering his CLL is under good control, his WBC is actually on the lower end of normal, and with risk of atrial fibrillation and bleeding on anticoagulants, we will continue to hold ibrutinib for now and monitor his counts. He has persistent thrombocytopenia. He will likely need to resume treatment at some point, but we can follow the counts and we could also consider other treatments as well for CLL.      -Continue to hold ibrutinib.  -He has not had frequent or recent infections.  -Patient continues to have thrombocytopenia with PLTs 60-70K. He denies bleed. He is on plavix. I discussed with patient thrombocytopenia <100K is indication for treatment for CLL. However, patient has long-standing  history of thrombocytopenia with platelet shantelle of 40K. As WBCs and Hb remain stable, will continue to monitor. Patient may also have concomitant underlying ITP. Consideration for N-plate in the future. When patient is meeting indication for resuming treatment, would elect for acalbrutinib over ibrutinib due to decreased risk of Afib and bleed. However, patient with persistent thrombocytopenia and treatment with acalbrutinib would also be higher risk.    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 He completed radiation at [facility] with planned 60 Gy x 30 fractions. He has completed radiation and noted that he did not need any more follow-up for this.    3) Abdominal aortic aneurysm: measures up to 7.6 cm on CT scan, incidentally found.  He underwent EVAR on 12/4/17.  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  He is on Plavix now.  On 5/13/19, he underwent and percutaneous aneurysm sac puncture and endo leak embolization by IR on 5/13/19.  -Follow-up with vascular surgery and IR.    4) Diabetes, hypertension:    -Following with PCP.    5) Atrial fibrillation with RVR s/p successful DCCV to SR:  -On metoprolol. He informs me he is off eliquis.  -Following with cardiology.    6) CAD:   -Follow-up with cardiology  -There are continued discussions in regards to PCI/CABG. He is uncertain if he is following with [facility] or locally.  -He is on plavix.    7) RTC in 3 months for follow-up and labs: CBC/diff, CMP, LDH.       Miriam Badillo DO  Hematology/Oncology  [facility]

## 2023-05-17 NOTE — NURSING NOTE
"Oncology Rooming Note    May 17, 2023 1:06 PM   Austin Garza is a 82 year old male who presents for:    Chief Complaint   Patient presents with     Oncology Clinic Visit     Chronic lymphocytic leukemia (CLL), B-cell      Initial Vitals: /81   Pulse 88   Temp 97.4  F (36.3  C) (Tympanic)   Resp 16   Wt 93 kg (205 lb 1.6 oz)   SpO2 98%   BMI 30.29 kg/m   Estimated body mass index is 30.29 kg/m  as calculated from the following:    Height as of 3/22/23: 1.753 m (5' 9\").    Weight as of this encounter: 93 kg (205 lb 1.6 oz). Body surface area is 2.13 meters squared.  No Pain (0) Comment: Data Unavailable   No LMP for male patient.  Allergies reviewed: Yes  Medications reviewed: Yes    Medications: Medication refills not needed today.  Pharmacy name entered into [employer]: [id number] - [place], [place] - [address] [facility] [place] [facility]    Clinical concerns: f/u      Geetha Benítez CMA              "

## 2023-05-17 NOTE — LETTER
5/17/2023         RE: Austin Garza  [address] [place] [address]        Dear Colleague,    Thank you for referring your patient, Austin Garza, to the [facility]. Please see a copy of my visit note below.    [facility]    Hematology/Oncology Established Patient Note      Today's Date: 5/17/23    Reason for Follow-up: CLL      HISTORY OF PRESENT ILLNESS: Austin Garza is an 82 year old male with CLL previously on ibrutinib and followed by Dr. Perry who has since relocated.     He has PMHx of DMII, HTN who presented with leukocytosis.  In November 2017, he was found to have elevated WBC of 61.7K, hemoglobin of 10.4, and platelet of 115K.  There were no other CBC results available between 2010 and 2017.  Prior to 2010, he had normal CBC, with normal to mild anemia, and mild thrombocytopenia.   He was asymptomatic.    He underwent bone marrow biopsy on 11/21/17, which was consistent with chronic lymphocytic leukemia/small lymphocytic lymphoma, with 13% ringed sideroblasts identified.  The presence of increased numbers of ringed sideroblasts raises the possibility of an associated myelodysplastic syndrome.  Dysplastic changes are not identified though.  Many cases exhibiting ringed sideroblasts are metabolic origin, without significant risk of progression to acute leukemia, and these typically do not show dysplastic morphology as is the case with this specimen.  15% ringed sideroblasts are required for a diagnosis for RARS, which this specimen does not meet.  Flow cytometry is also consistent with CLL/SLL.  Cytogenetics show two (10%) of the metaphase cells comprise a clone characterized by a deletion within the proximal long arm of a chromosome 13 as the sole karyotypic abnormality.  Three (15%) metaphases had loss of the Y chromosome as the sole abnormality.    CT scan on 11/29/17 shows mildly enlarged left axillary lymph node and  periaortic lymph nodes in the retroperitoneum of the abdomen.  Spleen is also enlarged.    On his staging CT scan for CLL, he was incidentally found to have a large infrarenal abdominal aortic aneurysm, measuring 7.6 cm.  He was seen by Dr. Thomas, and he underwent EVAR on 12/4/17.     He started ibrutinib on 5/4/20.  It was held 5/28/20-6/4/20 for tooth extraction.    He was hospitalized on 7/30/2021 for shortness of breath, new CHF, A. fib with RVR and ibrutinib was put on hold.  He was seen by cardiology and eventually underwent cardioversion for the A. fib.  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  He was started on Eliquis and metoprolol.  His Plavix was stopped.    He went to the [facility] ED on 9/26/21 and found to have pericardial effusion.  Due to lack of beds, he was transferred to [facility] for acute pericardial effusion and pericarditis.  He was started on colchicine and prednisone taper x 6 weeks.  He was also given Bactrim for PJP prophylaxis due duration of prednisone course.  He was discharged on 10/1/21.      Since his last visit in clinic, he states he has had multiple hospitalizations for back pain. He informs me he has cardiology follow up as he was discovered to have coronary artery disease. He has had angiogram. There are recommendations in regards to CABG vs PCI. He states he is uncertain if he will move forward with this. He will be seen in 11/3/22.      INTERIM HISTORY:   No acute events. Still seeing derm. No issues with bleed. He remains on plavix.       REVIEW OF SYSTEMS:   14 point ROS was reviewed and is negative other than as noted above in HPI.       HOME MEDICATIONS:  Current Outpatient Medications   Medication Sig Dispense Refill     amLODIPine (NORVASC) 5 MG tablet Take 1.5 tablets (7.5 mg) by mouth daily 135 tablet 3     aspirin (ASA) 81 MG EC tablet Take 81 mg by mouth daily       blood glucose (CONTOUR NEXT TEST) test strip Use to test blood sugar  "3-4 times daily or as directed. 100 strip 6     blood glucose monitoring (NO BRAND SPECIFIED) meter device kit Use to test blood sugar 2 times daily or as directed. 1 kit 0     clopidogrel (PLAVIX) 75 MG tablet Take 1 tablet (75 mg) by mouth daily 90 tablet 4     Dulaglutide (TRULICITY) 3 MG/0.5ML SOPN Inject 3 mg Subcutaneous every 7 days 6 mL 3     metoprolol succinate ER (TOPROL XL) 100 MG 24 hr tablet Take 1 tablet (100 mg) by mouth daily 90 tablet 0     nitroGLYcerin (NITROSTAT) 0.4 MG sublingual tablet One tablet under the tongue every 5 minutes if needed for chest pain. May repeat every 5 minutes for a maximum of 3 doses in 15 minutes\" 25 tablet 3     rosuvastatin (CRESTOR) 20 MG tablet Take 1 tablet (20 mg) by mouth daily 90 tablet 3     triamcinolone (KENALOG) 0.1 % external ointment 1 APPLICATION TWICE A DAY TOPICALLY TO ITCHY AREAS ON BACK FOR 2 WEEKS. REPEAT WITH FLARES.           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      CLL (chronic lymphocytic leukemia)      Diaphragmatic hernia      Diverticulitis of colon      Esophageal reflux      Essential hypertension      Hyperlipidemia      Irritable bowel syndrome      Macular degeneration (senile) of retina      Mumps      Squamous Cell Carcinoma, Back 1988     Type 2 diabetes mellitus          PAST SURGICAL HISTORY:  Past Surgical History:   Procedure Laterality Date     ANESTHESIA CARDIOVERSION N/A 8/2/2021    Procedure: ANESTHESIA, FOR CARDIOVERSION;  Surgeon: GENERIC ANESTHESIA PROVIDER;  Location: [facility] OR     APPENDECTOMY OPEN  1966     BONE MARROW BIOPSY, BONE SPECIMEN, NEEDLE/TROCAR N/A 11/21/2017    Procedure: BIOPSY BONE MARROW;  BONE MARROW BIOPSY;  Surgeon: Shady Garcia MD;  Location:  GI     COLONOSCOPY  2002     CV CORONARY ANGIOGRAM " N/A 2022    Procedure: Coronary Angiogram;  Surgeon: Evaristo Beaulieu MD;  Location:  HEART CARDIAC CATH LAB     ENDOVASCULAR REPAIR ANEURYSM ABDOMINAL AORTA N/A 2017    Procedure: ENDOVASCULAR REPAIR ANEURYSM ABDOMINAL AORTA;  ENDOVASCULAR REPAIR ANEURYSM ABDOMINAL AORTA;  Surgeon: Milton Cazares MD;  Location: [facility] OR     Fistulotomy with marsupialization for repair of fisture in ano       IR ABDOMINAL AORTOGRAM  3/11/2019     IR VISCERAL EMBOLIZATION  2019     Multiple skin tags - resection  1998     Squamous cell skin cancer resection - back       VASECTOMY           SOCIAL HISTORY:  Social History     Socioeconomic History     Marital status:      Spouse name: librado     Number of children: 0     Years of education: 17     Highest education level: Not on file   Occupational History     Occupation: retired   Tobacco Use     Smoking status: Former     Packs/day: 0.50     Years: 20.00     Pack years: 10.00     Types: Cigarettes     Quit date: 1975     Years since quittin.4     Smokeless tobacco: Former   Vaping Use     Vaping status: Not on file   Substance and Sexual Activity     Alcohol use: Yes     Alcohol/week: 10.0 - 14.0 standard drinks of alcohol     Types: 10 - 14 Standard drinks or equivalent per week     Comment: 3 drinks a day/wine     Drug use: No     Sexual activity: Never   Other Topics Concern     Parent/sibling w/ CABG, MI or angioplasty before 65F 55M? Not Asked   Social History Narrative     Not on file     Social Determinants of Health     Financial Resource Strain: Not on file   Food Insecurity: Not on file   Transportation Needs: Not on file   Physical Activity: Not on file   Stress: Not on file   Social Connections: Not on file   Intimate Partner Violence: Not At Risk (2023)    Humiliation, Afraid, Rape, and Kick questionnaire      Fear of Current or Ex-Partner: No      Emotionally Abused: No      Physically Abused: No      Sexually  Abused: No   Housing Stability: Not on file   He quit smoking in .  He drinks 1-3 glasses of wine a night with dinner.  He is retired, and used to work as a .  He lives with his wife in [place].  His cousin had lung cancer and  around age 69.  Otherwise, he denies family history of cancer.        FAMILY HISTORY:  Family History   Problem Relation Age of Onset     Cerebrovascular Disease Father         x 2     Hypertension Mother      C.A.D. Mother      Cancer Mother         skin     Eye Disorder Mother         glaucoma     Prostate Cancer No family hx of      Cancer - colorectal No family hx of      Glaucoma No family hx of      Macular Degeneration No family hx of          PHYSICAL EXAM:  /81   Pulse 88   Temp 97.4  F (36.3  C) (Tympanic)   Resp 16   Wt 93 kg (205 lb 1.6 oz)   SpO2 98%   BMI 30.29 kg/m    ECO  HEENT: Large bandage of the upper lip. He has ecchymosis of the oral labial fold into chin and upper cervical area. He has tenderness of the sinus.   GENERAL/CONSTITUTIONAL: No acute distress.  EYES: No scleral icterus.  NEUROLOGIC: Alert, oriented, answers questions appropriately.  INTEGUMENTARY: No jaundice.    GAIT: Steady, does not use assistive device      LABS:   Latest Reference Range & Units 05/15/23 09:23   Sodium 136 - 145 mmol/L 138   Potassium 3.4 - 5.3 mmol/L 4.3   Chloride 98 - 107 mmol/L 104   Carbon Dioxide (CO2) 22 - 29 mmol/L 24   Urea Nitrogen 8.0 - 23.0 mg/dL 18.8   Creatinine 0.67 - 1.17 mg/dL 1.43 (H)   GFR Estimate >60 mL/min/1.73m2 50 (L)   Calcium 8.8 - 10.2 mg/dL 8.6 (L)   Anion Gap 7 - 15 mmol/L 10   Albumin 3.5 - 5.2 g/dL 3.9   Protein Total 6.4 - 8.3 g/dL 6.3 (L)   Alkaline Phosphatase 40 - 129 U/L 65   ALT 10 - 50 U/L 26   AST 10 - 50 U/L 24   Bilirubin Total <=1.2 mg/dL 0.5   Glucose 70 - 99 mg/dL 203 (H)   Lactate Dehydrogenase 0 - 250 U/L 162   WBC 4.0 - 11.0 10e3/uL 7.9   Hemoglobin 13.3 - 17.7 g/dL 11.4 (L)   Hematocrit 40.0 - 53.0 % 33.8 (L)    Platelet Count 150 - 450 10e3/uL 67 (L)   RBC Count 4.40 - 5.90 10e6/uL 3.74 (L)   MCV 78 - 100 fL 90   MCH 26.5 - 33.0 pg 30.5   MCHC 31.5 - 36.5 g/dL 33.7   RDW 10.0 - 15.0 % 14.4   % Neutrophils % 37   % Lymphocytes % 56   % Monocytes % 5   % Eosinophils % 2   % Basophils % 0   Absolute Basophils 0.0 - 0.2 10e3/uL 0.0   Absolute Eosinophils 0.0 - 0.7 10e3/uL 0.2   Absolute Immature Granulocytes <=0.4 10e3/uL 0.0   Absolute Lymphocytes 0.8 - 5.3 10e3/uL 4.4   Absolute Monocytes 0.0 - 1.3 10e3/uL 0.4   % Immature Granulocytes % 0   Absolute Neutrophils 1.6 - 8.3 10e3/uL 2.9   Absolute NRBCs 10e3/uL 0.0   NRBCs per 100 WBC <1 /100 0       ASSESSMENT/PLAN:  Austin Garza is an 82 year old male with:    1) CLL/SLL: BLACKWOOD stage III, with lymphocytosis, lymphadenopathy, splenomegaly, and anemia.  He has mild thrombocytopenia as well, but is above 100K.  He presented with leukocytosis with WBC of 61.7K, hemoglobin of 10.4, and platelet count of 115K on diagnosis.  Bone marrow biopsy and flow cytometry confirmed CLL/SLL.  CT scan shows mildly enlarged left axillary lymph node and periaortic lymph nodes in the retroperitoneum of the abdomen, with enlarged spleen.      Due to worsening lymphocyte count, anemia, thrombocytopenia, as well as fatigue and night sweats, he started ibrutinib on 5/4/20.      His WBC normalized, with stable hemoglobin and platelet count.    In light of his recent issues with atrial fibrillation and now on Eliquis, his ibrutinib has been on hold since August 2021.  Considering his CLL is under good control, his WBC is actually on the lower end of normal, and with risk of atrial fibrillation and bleeding on anticoagulants, we will continue to hold ibrutinib for now and monitor his counts. He has persistent thrombocytopenia. He will likely need to resume treatment at some point, but we can follow the counts and we could also consider other treatments as well for CLL.      -Continue to hold  ibrutinib.  -He has not had frequent or recent infections.  -Patient continues to have thrombocytopenia with PLTs 60-70K. He denies bleed. He is on plavix. I discussed with patient thrombocytopenia <100K is indication for treatment for CLL. However, patient has long-standing history of thrombocytopenia with platelet shantelle of 40K. As WBCs and Hb remain stable, will continue to monitor. Patient may also have concomitant underlying ITP. Consideration for N-plate in the future. When patient is meeting indication for resuming treatment, would elect for acalbrutinib over ibrutinib due to decreased risk of Afib and bleed. However, patient with persistent thrombocytopenia and treatment with acalbrutinib would also be higher risk.    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 He completed radiation at [facility] with planned 60 Gy x 30 fractions. He has completed radiation and noted that he did not need any more follow-up for this.    3) Abdominal aortic aneurysm: measures up to 7.6 cm on CT scan, incidentally found.  He underwent EVAR on 12/4/17.  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  He is on Plavix now.  On 5/13/19, he underwent and percutaneous aneurysm sac puncture and endo leak embolization by IR on 5/13/19.  -Follow-up with vascular surgery and IR.    4) Diabetes, hypertension:    -Following with PCP.    5) Atrial fibrillation with RVR s/p successful DCCV to SR:  -On metoprolol. He informs me he is off eliquis.  -Following with cardiology.    6) CAD:   -Follow-up with cardiology  -There are continued discussions in regards to PCI/CABG. He is uncertain if he is following with [place] or locally.  -He is on plavix.    7) RTC in 3 months for follow-up and labs: CBC/diff, CMP, LDH.       Miriam Badillo, DO  Hematology/Oncology  [facility]        Again, thank you for allowing me to participate in  the care of your patient.        Sincerely,        Miriam Badillo, DO

## 2023-05-26 NOTE — PATIENT INSTRUCTIONS
Benji is scheduled for labs on 08/15/23 and a follow up with Dr. Badillo on 08/17/23.     Letty Arevalo RN on 5/26/2023 at 1:06 PM

## 2023-05-30 PROBLEM — I42.9 CARDIOMYOPATHY, UNSPECIFIED TYPE (H): Status: ACTIVE | Noted: 2023-01-01

## 2023-05-30 NOTE — PATIENT INSTRUCTIONS
Decrease the amlodipine (Norvasc) to one tablet per day (5mg total)    If we need to reschedule the July appointment let me know       Try the omeprazole (Prilosec) medication to see if that helps your throat/swallowing symptoms       Increase the Trulicity to 4.5mg (new prescription sent in)              Patient Education   Personalized Prevention Plan  You are due for the preventive services outlined below.  Your care team is available to assist you in scheduling these services.  If you have already completed any of these items, please share that information with your care team to update in your medical record.  Health Maintenance Due   Topic Date Due    Diptheria Tetanus Pertussis (DTAP/TDAP/TD) Vaccine (1 - Tdap) 10/06/2012    Cholesterol Lab  02/15/2023    Kidney Microalbumin Urine Test  02/15/2023    Diabetic Foot Exam  02/15/2023    A1C Lab  05/10/2023    ANNUAL REVIEW OF HM ORDERS  06/22/2023     Your Health Risk Assessment indicates you feel you are not in good emotional health.    Recreation   Recreation is not limited to sports and team events. It includes any activity that provides relaxation, interest, enjoyment, and exercise. Recreation provides an outlet for physical, mental, and social energy. It can give a sense of worth and achievement. It can help you stay healthy.    Mental Exercise and Social Involvement  Mental and emotional health is as important as physical health. Keep in touch with friends and family. Stay as active as possible. Continue to learn and challenge yourself.   Things you can do to stay mentally active are:  Learn something new, like a foreign language or musical instrument.   Play SCRABBLE or do crossword puzzles. If you cannot find people to play these games with you at home, you can play them with others on your computer through the Internet.   Join a games club--anything from card games to chess or checkers or lawn bowling.   Start a new hobby.   Go back to school.  "  Volunteer.   Read.   Keep up with world events.    Depression and Suicide in Older Adults  Nearly 2 million older adults in the [place] have some type of depression. Some of them even take their own lives. Yet depression among older adults is often ignored. Learning about the warning signs of depression may help spare a loved one needless pain. You may also save a life.   What is depression?  Depression is a common and serious illness. It affects the way you think and feel. It is not a normal part of aging. It is not a sign of weakness, a character flaw, or something you can \"snap out of.\" Most people with depression need treatment to get better. The most common symptom is a feeling of deep sadness. People who are depressed also may seem tired and listless. And nothing seems to give them pleasure. It s normal to grieve or be sad sometimes. But sadness lessens or passes with time. Depression rarely goes away or improves on its own. Other symptoms of depression are:   Sleeping more or less than normal  Eating more or less than normal  Having headaches, stomachaches, or other pains that don t go away  Feeling nervous,  empty,  or worthless  Crying a lot  Thinking or talking about suicide or death  Loss of interest in activities previously enjoyed  Social isolation  Feeling confused or forgetful  What causes it?  The causes of depression aren t fully known. But it is thought to result from a complex blend of these factors:   Biochemistry. Certain chemicals in the brain play a role.  Genes. Depression does run in families.  Life stress. Life stresses can also trigger depression in some people. Older adults often face many stressors. These may include isolation, the death of friends or a spouse, health problems, and financial concerns.  Chronic health conditions. This includes diabetes, heart disease, or cancer. These can cause symptoms of depression. Medicine side effects can cause changes in thoughts and " behaviors.  Giving support    Depressed people often may not want to ask for help. When they do, they may be ignored. Or they may get the wrong treatment. You can help by showing parents and older friends love and support. If they seem depressed, don t lecture the person or ignore the symptoms. Don't discount the symptoms as a  normal  part of aging. They are not. Get involved, listen, and show interest and support.   Help them understand that depression is a treatable illness. Tell them you can help them find the right treatment. Offer to go to their healthcare provider's appointment with them for support when the symptoms are discussed. With their approval, contact a local mental health center, social service agency, or hospital about services.   Helping at healthcare visits  You can be an advocate for them at healthcare appointments. Many older adults have chronic illnesses. Many of these can cause symptoms of depression. Medicine side effects can change thoughts and behaviors.   You can help make sure that the healthcare provider looks at all of these factors. They should refer your family member or friend to a mental healthcare provider when needed. In some cases, untreated depression can lead to a misdiagnosis. A person may be diagnosed with a brain disorder such as dementia. If the healthcare provider does not take the issue of depression seriously, help your family member or friend find another provider.   Asking about self-harm thoughts  If you think an older person you care about could be suicidal, ask,  Have you thought about suicide?  Most people will tell you the truth. If they say yes, they may already have a plan for how and when they will attempt it. Find out as much as you can. The more detailed the plan, and the easier it is to carry out, the more danger the person is in right now. Tell the person you are there for them and you do not want them to get harmed. Don't wait to get help for the person.  Call the person's healthcare provider, local hospital, or emergency services.   Call 988 in a crisis   Never leave the person alone. A person who is actively suicidal needs crisis care right away. They need constant supervision. Never leave the person out of sight. Call 988 Tell the crisis counselor you need help for a person who is thinking about suicide. The counselor will help you get the right level of crisis help. You may be advised to take the person to the nearest emergency room.   The National Suicide Prevention Lifeline is available at 988, or 380-706-CNKS (425-483-8830), or www.suicidepreventionlifeline.org. When you call or text 988, you will be connected to trained counselors who are part of the National Suicide Prevention Lifeline network. An online chat option is also available. Lifeline is free and available 24/7.   To learn more  National Suicide Prevention Lifeline at www.suicidepreventionlifeline.org  or 879-466-DWHV (289-608-5227)  [employer] [place] [employer]ness at www.stefan.org  or 014-228-OGYD (954-301-6952)  Mental [facility] [place] at www.nmha.org  or 776-095-7563  National [place] of Mental Health at www.nimh.nih.gov  or 974-489-7216

## 2023-05-30 NOTE — PROGRESS NOTES
"SUBJECTIVE:   Benji is a 82 year old who presents for Preventive Visit.      5/30/2023     9:50 AM   Additional Questions   Roomed by [facility]   Accompanied by self         5/30/2023     9:50 AM   Patient Reported Additional Medications   Patient reports taking the following new medications none   Patient has been advised of split billing requirements and indicates understanding: Yes  Are you in the first 12 months of your Medicare coverage?  No    Healthy Habits:     In general, how would you rate your overall health?  Good    Frequency of exercise:  2-3 days/week    Duration of exercise:  15-30 minutes    Do you usually eat at least 4 servings of fruit and vegetables a day, include whole grains    & fiber and avoid regularly eating high fat or \"junk\" foods?  Yes    Taking medications regularly:  Yes    Medication side effects:  Lightheadedness    Ability to successfully perform activities of daily living:  No assistance needed    Home Safety:  No safety concerns identified    Hearing Impairment:  No hearing concerns    In the past 6 months, have you been bothered by leaking of urine?  No    In general, how would you rate your overall mental or emotional health?  Fair      PHQ-2 Total Score: 2    Additional concerns today:  No      Dizziness & balance  -hard time balancing on one foot    Recently saw cardiology at [facility] and had labs including CBC, A1c (6.5%), lipids (LDL 51)    Esophageal issues  -eats rodriguez for lunch: cheeseburger, fries, coke; \"clogs up, stops dead\"  -no issues on first bite  -second and third bite feels stuck  -sticks finger down throat  -been going on for 20ish years  -told had hiatal hernia  -had steak last night; cut up into tiny pieces  -patient thinks coca cola is the issue    Feet feel hot at night when going to bed    Sleep also an issue  -sticks feet out     Have you ever done Advance Care Planning? (For example, a Health Directive, POLST, or a discussion with a medical provider or your " loved ones about your wishes): Yes, advance care planning is on file.       Fall risk  Fallen 2 or more times in the past year?: No  Any fall with injury in the past year?: No    Cognitive Screening   1) Repeat 3 items (Leader, Season, Table)    2) Clock draw: NORMAL  3) 3 item recall: Recalls 2 objects   Results: NORMAL clock, 1-2 items recalled: COGNITIVE IMPAIRMENT LESS LIKELY    Mini-CogTM Copyright KATHY Pretty. Licensed by the author for use in [facility]; reprinted with permission (janice@[facility]). All rights reserved.      Do you have sleep apnea, excessive snoring or daytime drowsiness?: no    Reviewed and updated as needed this visit by clinical staff   Tobacco  Allergies               Reviewed and updated as needed this visit by Provider                 Social History     Tobacco Use     Smoking status: Former     Packs/day: 0.50     Years: 20.00     Pack years: 10.00     Types: Cigarettes     Quit date: 1975     Years since quittin.4     Smokeless tobacco: Former   Vaping Use     Vaping status: Not on file   Substance Use Topics     Alcohol use: Yes     Alcohol/week: 10.0 - 14.0 standard drinks of alcohol     Types: 10 - 14 Standard drinks or equivalent per week     Comment: 3 drinks a day/wine             2023     9:55 AM   Alcohol Use   Prescreen: >3 drinks/day or >7 drinks/week? No     Do you have a current opioid prescription? No  Do you use any other controlled substances or medications that are not prescribed by a provider? None        Current providers sharing in care for this patient include:   Patient Care Team:  Luma Kauffman MD as PCP - General (Internal Medicine - Pediatrics)  Breana Perry MD as MD (Hematology & Oncology)  Klaudia Santa [facility] as Pharmacist (Pharmacist)  Wenceslao Teran MD as MD (Urology)  Klaudia Santa [facility] as Assigned [facility] Pharmacist  Sirisha Junior EP as Cardiac Rehabilitation Therapist  Cristina Allen  NICOLASA Millan as MD (Ophthalmology)  Cristina Allen OD as Assigned Surgical Provider  Vanessa Munson MD as Assigned PCP  Miriam Badillo DO as Assigned Cancer Care Provider  Linda Castañeda APRN CNP as Assigned Heart and Vascular Provider  Wendie Acevedo, RN as Personal Advocate & Liaison (PAL)  Letty Arevalo, RN as Specialty Care Coordinator (Hematology & Oncology)    The following health maintenance items are reviewed in Epic and correct as of today:  Health Maintenance   Topic Date Due     DTAP/TDAP/TD IMMUNIZATION (1 - Tdap) 10/06/2012     LIPID  02/15/2023     MICROALBUMIN  02/15/2023     DIABETIC FOOT EXAM  02/15/2023     A1C  05/10/2023     ANNUAL REVIEW OF HM ORDERS  06/22/2023     EYE EXAM  04/28/2024     BMP  05/15/2024     HEMOGLOBIN  05/15/2024     MEDICARE ANNUAL WELLNESS VISIT  05/30/2024     FALL RISK ASSESSMENT  05/30/2024     ADVANCE CARE PLANNING  05/30/2028     PHQ-2 (once per calendar year)  Completed     INFLUENZA VACCINE  Completed     Pneumococcal Vaccine: 65+ Years  Completed     URINALYSIS  Completed     ZOSTER IMMUNIZATION  Completed     COVID-19 Vaccine  Completed     IPV IMMUNIZATION  Aged Out     MENINGITIS IMMUNIZATION  Aged Out     Review of Systems   Constitutional: Negative for chills and fever.   HENT: Negative for congestion, ear pain, hearing loss and sore throat.    Eyes: Negative for pain and visual disturbance.   Respiratory: Positive for shortness of breath. Negative for cough.    Cardiovascular: Negative for chest pain, palpitations and peripheral edema.   Gastrointestinal: Negative for abdominal pain, constipation, diarrhea, heartburn, hematochezia and nausea.   Genitourinary: Negative for dysuria, frequency, genital sores, hematuria, impotence, penile discharge and urgency.   Musculoskeletal: Positive for arthralgias. Negative for joint swelling and myalgias.   Skin: Negative for rash.   Neurological: Positive for dizziness. Negative for weakness, headaches and  "paresthesias.   Psychiatric/Behavioral: Negative for mood changes. The patient is not nervous/anxious.      OBJECTIVE:   /81   Pulse 84   Temp 97.2  F (36.2  C) (Temporal)   Resp 16   Ht 1.737 m (5' 8.39\")   Wt 92.8 kg (204 lb 8 oz)   SpO2 99%   BMI 30.74 kg/m   Estimated body mass index is 30.74 kg/m  as calculated from the following:    Height as of this encounter: 1.737 m (5' 8.39\").    Weight as of this encounter: 92.8 kg (204 lb 8 oz).  Physical Exam  GENERAL: healthy, alert and no distress  EYES: Eyes grossly normal to inspection, and conjunctivae and sclerae normal  HENT: ear canals and TM's normal; wears hearing aids bilaterally   NECK: no adenopathy, no asymmetry, masses, or scars and thyroid normal to palpation  RESP: lungs clear to auscultation - no rales, rhonchi or wheezes  CV: regular rate and rhythm, normal S1 S2, no S3 or S4, no murmur, click or rub, no peripheral edema and peripheral pulses strong  ABDOMEN: soft, nontender, no hepatosplenomegaly, no masses   MS: no gross musculoskeletal defects noted, no edema  SKIN: no suspicious lesions or rashes  NEURO: Normal strength and tone, mentation intact and speech normal  PSYCH: mentation appears normal, affect normal/bright    ASSESSMENT / PLAN:       ICD-10-CM    1. Encounter for Medicare annual wellness exam  Z00.00 PRIMARY CARE FOLLOW-UP SCHEDULING      2. Atherosclerosis of native coronary artery of native heart without angina pectoris  I25.10 Adult Cardiology Eval  Referral      3. Type 2 diabetes mellitus with other specified complication, with long-term current use of insulin (H)  E11.69 Dulaglutide 4.5 MG/0.5ML SOPN    Z79.4 Albumin Random Urine Quantitative with Creat Ratio      4. Cardiomyopathy, unspecified type (H)  I42.9 Adult Cardiology Eval  Referral      5. Artery dissection (H)  I77.70       6. Morbidly obese (H)  E66.01       7. Stage 3a chronic kidney disease (H)  N18.31 Albumin Random Urine Quantitative " "with Creat Ratio      8. Hypertension goal BP (blood pressure) < 140/90  I10 amLODIPine (NORVASC) 5 MG tablet     Adult Cardiology Eval  Referral      9. Globus sensation  R09.89 omeprazole (PRILOSEC) 20 MG DR capsule      10. Foot pain, bilateral  M79.671     M79.672         2, 4. Was following with [place] [facility]ology. Done with cardiac rehab. LDL at goal.  5, 8. Patient having symptoms of orthostasis after increase in amlodipine (Norvasc). Recommend g oback to 5mg daily - will follow up with me in July. Goal blood pressure M130/80 in setting of previous abodimnal aortic aneurysm and coronary artery disease s/p stent.  7. Likely relate to hypertension. Monitor microalbumin - may be able to give urine sample to day or next visit.  9. Try PPI. If not improving at next visit consider upper endoscopy. Patient prefers to not see gastroenterology or endoscopy at this time.  10. Possible neuropathy. Patient is going to try compression socks. Consider gabapentin at night at next appointment if not improved with that.      Follow-up  -endoscopy?  -blood pressure elevated? Amlodipine (Norvasc) changed  -gabapentin?  -microalbumin  -diabetic foot exam    COUNSELING:  Reviewed preventive health counseling, as reflected in patient instructions      BMI:   Estimated body mass index is 30.74 kg/m  as calculated from the following:    Height as of this encounter: 1.737 m (5' 8.39\").    Weight as of this encounter: 92.8 kg (204 lb 8 oz).         He reports that he quit smoking about 48 years ago. His smoking use included cigarettes. He has a 10.00 pack-year smoking history. He has quit using smokeless tobacco.      Appropriate preventive services were discussed with this patient, including applicable screening as appropriate for cardiovascular disease, diabetes, osteopenia/osteoporosis, and glaucoma.  As appropriate for age/gender, discussed screening for colorectal cancer, prostate cancer, breast cancer, and cervical cancer. " Checklist reviewing preventive services available has been given to the patient.    Reviewed patients plan of care and provided an AVS. The Basic Care Plan (routine screening as documented in Health Maintenance) for [place] meets the Care Plan requirement. This Care Plan has been established and reviewed with the Patient.          Luma Kauffman MD  [facility]    Identified Health Risks:    I have reviewed Opioid Use Disorder and Substance Use Disorder risk factors and made any needed referrals.       The patient was provided with suggestions to help him develop a healthy emotional lifestyle.  The patient's PHQ-9 score is consistent with mild depression. He was provided with information regarding depression and was advised to schedule a follow up appointment in weeks to further address this issue.

## 2023-05-30 NOTE — LETTER
May 31, 2023      Benji Garza  [address] [place] [address]        Dear Benji,     Here are your recent lab results:     There is a slightly increased amount of protein in your urine. We will continue to monitor this.     Resulted Orders   Albumin Random Urine Quantitative with Creat Ratio   Result Value Ref Range    Creatinine Urine mg/dL 123.0 mg/dL      Comment:      The reference ranges have not been established in urine creatinine. The results should be integrated into the clinical context for interpretation.    Albumin Urine mg/L 39.0 mg/L      Comment:      The reference ranges have not been established in urine albumin. The results should be integrated into the clinical context for interpretation.    Albumin Urine mg/g Cr 31.71 (H) 0.00 - 17.00 mg/g Cr      Comment:      Microalbuminuria is defined as an albumin:creatinine ratio of 17 to 299 for males and 25 to 299 for females. A ratio of albumin:creatinine of 300 or higher is indicative of overt proteinuria.  Due to biologic variability, positive results should be confirmed by a second, first-morning random or 24-hour timed urine specimen. If there is discrepancy, a third specimen is recommended. When 2 out of 3 results are in the microalbuminuria range, this is evidence for incipient nephropathy and warrants increased efforts at glucose control, blood pressure control, and institution of therapy with an angiotensin-converting-enzyme (ACE) inhibitor (if the patient can tolerate it).       Please let us know if you have questions or concerns.         Johnny,       Luma Kauffman MD   Internal Medicine & Pediatrics   [facility]

## 2023-05-31 NOTE — TELEPHONE ENCOUNTER
Fax received from [facility]:     Drug: Omeprazole DR 20 MG Capsule    Reason for request: alternative requested    Pharmacy comment: alternative requested: patient is taking clopidegrol; please send alternative for omeprazole, it decrease the efficacy of clopidegrol.    Fax placed on provider's desk.    Ita Oneal on 5/31/2023 at 5:19 PM

## 2023-06-01 NOTE — TELEPHONE ENCOUNTER
Pantoprazole ordered.    Luma Kauffman MD  Internal Medicine & Pediatrics  [facility]  She/her

## 2023-06-02 NOTE — TELEPHONE ENCOUNTER
Prior Authorization Retail Medication Request    Medication/Dose: pantoprazole (PROTONIX) 20 MG EC tablet  ICD code (if different than what is on RX):  Globus sensation [R09.89]  - Primary   Previously Tried and Failed:    Rationale:      Insurance Name:    Insurance ID:        Pharmacy Information (if different than what is on RX)  Name:  MARYANN Shay  Phone:

## 2023-06-08 NOTE — TELEPHONE ENCOUNTER
Central Prior Authorization Team   Phone: [phone]      PA Initiation    Medication: PANTOPRAZOLE SODIUM 20 MG PO [facility]  Insurance Company: Medicare Blue - Phone [phone] Fax [phone]  Pharmacy Filling the Rx: [id number] - [place], [place] - [address] AT [facility]  Filling Pharmacy Phone: [phone]  Filling Pharmacy Fax:    Start Date: 6/8/2023

## 2023-06-08 NOTE — TELEPHONE ENCOUNTER
PRIOR AUTHORIZATION DENIED    Medication: PANTOPRAZOLE SODIUM 20 MG PO [facility]  Insurance Company: Medicare Blue - Phone [phone] Fax [phone]  Denial Date: 6/8/2023  Denial Rational: Dose can be made with fewer qty of higher strength      Appeal Information:     If provider would like to appeal please review the plan's reasons for denial listed above. Please utilize that information to complete letter and provide specific, detailed clinical information/rationale of your patient's health status to address their denial reasons.      Patient Notified: No

## 2023-06-08 NOTE — TELEPHONE ENCOUNTER
New prescription for pantoprazole 40mg tablets sent.    Luma Kauffman MD  Internal Medicine & Pediatrics  [facility]  She/her

## 2023-06-08 NOTE — TELEPHONE ENCOUNTER
May Perez Ea/Sheldon 2 minutes ago (2:08 PM)       PA has been denied, please see rational.  If provider would like to appeal please review the plan's reasons for denial listed. Please utilize that information to complete letter and provide specific, detailed clinical information/rationale of your patient's health status to address their denial reasons and place letter in patient's chart. If done with encounter, please notify the patient and close the encounter.     Thank you,   Central PA Team

## 2023-06-19 NOTE — TELEPHONE ENCOUNTER
Routing refill request to provider for review/approval because:  Associated diagnosis needed.     Daniela RODRIGUES RN   [facility]

## 2023-06-19 NOTE — TELEPHONE ENCOUNTER
"Pt calling stating that he had rectal bleeding when he had a stool last night. It filled the toilet and was on the toilet paper when he wiped. His stools have been \"normal.\" He goes every day and does not have a problem with constipation or diarrhea. No abdominal pain. Pt states that he does have a rash that delveloped on Friday on his lower portion of his body. Feels this is unrelated. Pt is not having any other symtpoms.    Pt is on Plavix 75mg daily. Pt was up all night worried. Would like to be seen today.    Reason for Disposition    Patient wants to be seen    Additional Information    Negative: Passed out (i.e., fainted, collapsed and was not responding)    Negative: Shock suspected (e.g., cold/pale/clammy skin, too weak to stand, low BP, rapid pulse)    Negative: Vomiting red blood or black (coffee ground) material    Negative: Sounds like a life-threatening emergency to the triager    Negative: Diarrhea is main symptom    Negative: Rectal symptoms    Negative: SEVERE rectal bleeding (large blood clots; constant or on and off bleeding)    Negative: SEVERE dizziness (e.g., unable to stand, requires support to walk, feels like passing out now)    Negative: MODERATE rectal bleeding (small blood clots, passing blood without stool, or toilet water turns red) more than once a day    Negative: Bloody, black, or tarry bowel movements  (Exception: Chronic-unchanged black-grey bowel movements and is taking iron pills or Pepto-Bismol.)    Negative: High-risk adult (e.g., prior surgery on aorta, abdominal aortic aneurysm)    Negative: Rectal foreign body (inserted or swallowed)    Negative: SEVERE abdominal pain (e.g., excruciating)    Negative: Constant abdominal pain lasting > 2 hours    Negative: Pale skin (pallor) of new-onset or worsening    Negative: Patient sounds very sick or weak to the triager    Answer Assessment - Initial Assessment Questions  1. APPEARANCE of BLOOD: \"What color is it?\" \"Is it passed " "separately, on the surface of the stool, or mixed in with the stool?\"      Pt stating that he had a stool yesterday and with blood in the toilet bowel  2. AMOUNT: \"How much blood was passed?\"       Hard to tell  3. FREQUENCY: \"How many times has blood been passed with the stools?\"       1  4. ONSET: \"When was the blood first seen in the stools?\" (Days or weeks)       yesterday  5. DIARRHEA: \"Is there also some diarrhea?\" If Yes, ask: \"How many diarrhea stools in the past 24 hours?\"       no  6. CONSTIPATION: \"Do you have constipation?\" If Yes, ask: \"How bad is it?\"      no  7. RECURRENT SYMPTOMS: \"Have you had blood in your stools before?\" If Yes, ask: \"When was the last time?\" and \"What happened that time?\"       Stools have been normal. Daily  8. BLOOD THINNERS: \"Do you take any blood thinners?\" (e.g., Coumadin/warfarin, Pradaxa/dabigatran, aspirin)      plavix   9. OTHER SYMPTOMS: \"Do you have any other symptoms?\"  (e.g., abdomen pain, vomiting, dizziness, fever)      rast  10. PREGNANCY: \"Is there any chance you are pregnant?\" \"When was your last menstrual period?\"        no    Protocols used: RECTAL BLEEDING-A-OH    Radha Beard RN  [facility]  "

## 2023-06-19 NOTE — TELEPHONE ENCOUNTER
LVM for patient to return call and schedule. Can schedule with Conchita REBOLLEDO in an Approval Required spot.    Ita Oneal on 6/19/2023 at 9:38 AM

## 2023-06-28 NOTE — TELEPHONE ENCOUNTER
Called pt to discuss his high heart rate.  Patient woke up this morning with a heart rate of 120 beats per minute and it has not slowed down since.  Pt takes metoprolol  mg daily and took it this morning.  Patient states he has no new symptoms today.  Patient denies feeling short of breath with activity.  Patient has an OV with Dr Bah 8/23/23.  Routing to [place] for review and recommendations.  Pt last saw Linda on 7/8/22.  Advised pt to go into the emergency room if symptoms arise prior to hearing back with Linda's recommendations.  Routing to JUAN JOSÉ Ng, and Team 6 RN, in Linda's absence.    ADDENDUM  Reviewed with Dr Christofer Johnson's RN as Dr Beaulieu is rounding in clinic.  Elizabeth advised pt to go the the emergency room.  Called pt back and advised him to go to the emergency room now.  Pt agreeable to this.

## 2023-06-28 NOTE — TELEPHONE ENCOUNTER
"Patient spoke with Triage regarding high heart rate. Current HR is 120 and was 120 when he woke this morning. No current symptoms other than ongoing chronic dizziness. This AM is the first he has noticed his heart rate this high and had a similar episode a couple years ago. No current new medications. Routing to his care team with Linda Castañeda who last saw the patient.     1. DESCRIPTION: \"Please describe your heart rate or heartbeat that you are having\" (e.g., fast/slow, regular/irregular, skipped or extra beats, \"palpitations\") Fast.  2. ONSET: \"When did it start?\" (Minutes, hours or days) This AM.  3. DURATION: \"How long does it last\" (e.g., seconds, minutes, hours) Current.   4. PATTERN \"Does it come and go, or has it been constant since it started?\" \"Does it get worse with exertion?\" \"Are you feeling it now?\" Current.  5. TAP: \"Using your hand, can you tap out what you are feeling on a chair or table in front of you, so that I can hear?\" (Note: not all patients can do this)   6. HEART RATE: \"Can you tell me your heart rate?\" \"How many beats in 15 seconds?\" (Note: not all patients can do this) 120 bpm.  7. RECURRENT SYMPTOM: \"Have you ever had this before?\" If Yes, ask: \"When was the last time?\" and \"What happened that time?\" This occurrence. Patient says he had this occur two years ago with a similar episode.  8. CAUSE: \"What do you think is causing the Tachycardia?\" Cardiac cause.  9. CARDIAC HISTORY: \"Do you have any history of heart disease?\" (e.g., heart attack, angina, bypass surgery, angioplasty, arrhythmia) CAD, A-Fib, HTN.  10. OTHER SYMPTOMS: \"Do you have any other symptoms?\" (e.g., dizziness, chest pain, sweating, difficulty breathing) None.    "

## 2023-07-07 NOTE — PATIENT INSTRUCTIONS
Let's get a Ziopatch for a few days to see how often you are in atrial fibrillation  -you should get called to schedule that set up      Stop the amlodipine (Norvasc) and check your blood pressure   -we'll call you in 1-2 weeks to check and see how the blood pressure readings are    Blood work today    Call [facility] to schedule the large polyp removal because now that it's been >6 months since the cardiac intervention we can stop plavix for a few days before the procedure    Call [facility] to schedule appointment  [phone] [address] [facility]  [address]: [address] (Clinic) / [phone] (Endoscopy Center)  [place] [address]

## 2023-07-07 NOTE — TELEPHONE ENCOUNTER
In 1-2 weeks could someone call patient to see how orthostatic symptoms and home blood pressure readings are?  We stopped amlodipine (Norvasc) 5mg due to orthostatic symptoms. If persistently >140/90 would resume amlod 2.5mg.    Please also check to see if he got in touch with [facility] about polyp removal.    Luma Kauffman MD  Internal Medicine & Pediatrics  [facility]  She/her

## 2023-07-07 NOTE — PROGRESS NOTES
Assessment & Plan     Atherosclerosis of native coronary artery of native heart without angina pectoris  Now 6 months post PCI and on plavix. Ok to hold plavix for large polyp removal with [facility]. Recommend patient call [facility] and I can do pre-op or speak with provider if needed.    Paroxysmal atrial fibrillation (H)  Patient reports increase symptoms of palpitations. Has cardiology appointment in August for heart failure - recommend obtain zio to assess atrial fibrillation burden. On metoprolol (Lopressor or Toprol).  - Adult Leadless EKG Monitor 3 to 7 Days; Future    Dizziness  Blood pressure has been normal. Given patient symptoms and fear of falling particularly with positional changes recommend stop amlodipine (Norvasc) and we will call in 1-2 weeks to see what home blood pressure readings have been and how orthostatic symptoms are.   - CBC with platelets; Future  - Basic metabolic panel  (Ca, Cl, CO2, Creat, Gluc, K, Na, BUN); Future  - Ferritin; Future  - CBC with platelets  - Basic metabolic panel  (Ca, Cl, CO2, Creat, Gluc, K, Na, BUN)  - Ferritin    Abnormal finding of blood chemistry, unspecified  In setting of rectal bleeding and known polyp.  - Ferritin; Future  - Ferritin    Type 2 diabetes mellitus with other specified complication, with long-term current use of insulin (H)  Not intentionally drawn - drawn as part of health maintenance. Given age have higher A1c threshold.  - Hemoglobin A1c  - Albumin Random Urine Quantitative with Creat Ratio      Luma Kauffman MD  [facility] CYRUS Leblanc is a 82 year old, presenting for the following health issues:  Hypertension        7/7/2023     9:11 AM   Additional Questions   Roomed by RHS   Accompanied by self         7/7/2023     9:11 AM   Patient Reported Additional Medications   Patient reports taking the following new medications none     HPI     Hypertension Follow-up      Do you check your  "blood pressure regularly outside of the clinic? No     Are you following a low salt diet? Yes    Are your blood pressures ever more than 140 on the top number (systolic) OR more   than 90 on the bottom number (diastolic), for example 140/90? No      How many servings of fruits and vegetables do you eat daily?  0-1    On average, how many sweetened beverages do you drink each day (Examples: soda, juice, sweet tea, etc.  Do NOT count diet or artificially sweetened beverages)?   0    How many days per week do you exercise enough to make your heart beat faster? 3 or less    How many minutes a day do you exercise enough to make your heart beat faster? 30 - 60  How many days per week do you miss taking your medication? 1    What makes it hard for you to take your medications?  remembering to take    BP Readings from Last 6 Encounters:   07/07/23 122/64   05/30/23 137/81   05/17/23 131/81   02/16/23 (!) 142/86   11/28/22 138/68   10/14/22 126/78       3 major concerns  -hematochezia and history of large polyp unable to get removal due to needing to be on plavix  -dizziness and afraid of falling  -palpitations and history paroxysmal atrial fibrillation : seen in ED (emergency department) a few weeks ago and was in sinus rhythm but intermittent palpitations            Objective    /64 (BP Location: Right arm, Patient Position: Sitting, Cuff Size: Adult Large)   Pulse 99   Temp 97.6  F (36.4  C) (Temporal)   Resp 20   Ht 1.737 m (5' 8.4\")   Wt 91.6 kg (202 lb)   SpO2 99%   BMI 30.36 kg/m    Body mass index is 30.36 kg/m .  Physical Exam   GENERAL: healthy, alert and no distress  CV: regular rate and rhythm, normal S1 S2, no S3 or S4, no murmur, click or rub, no peripheral edema and peripheral pulses strong  MS: no gross musculoskeletal defects noted, no edema        "

## 2023-07-25 NOTE — TELEPHONE ENCOUNTER
See the other  message for details.    SHAMIR Jauregui  Patient Advocate Liason (PAL)  [facility]  Ph. [phone] / Fax. [phone]

## 2023-07-25 NOTE — TELEPHONE ENCOUNTER
See pt's MC message. LOV was on 7/7/23. Called pt at [phone] to get details.     Joint pain:  - experiencing joint pain in multiple locations since Sat  - tried tylenol 2 tabs bid on Sat which didn't help at all  - took ibuprofen(he knows not to take NSAID because of he is on plavix) which helped a bit  - has worsening R knee pain on Mon, seen in [facility] yesterday, x-ray done with no definite answer, sent home to take tylenol 1000 mg tid  - denies swelling, warmth to touch or redness  - uses cane to ambulate at home, standing & walking are ok  - having tough time with sitting to standing & standing sitting movements  Worsening anemia, uncontrolled BG, neuropathy, arthritis??? Advised to try otc voltaren gel for joint pain.     High BG:  - started trulicity 4.5 mg weekly about 4 weeks ago, tolerating well  - started monitoring BG once a day(fasting) for the past 2-3 weeks  - fasting BG runs anywhere from 250-320  - denies hyperglycemia sx's  Need med dose evaluation for better DM control.    BP f/up after holding amlodipine:  - stopped amlodipine, continuing metoprolol 100 mg daily  - home BP runs between 120-130/60-70  - dizziness has been better, still has 10-15 secs of lightheadedness(room spinning) early morning  Discontinued amlodipine from the med list.     [facility] f/up on polyp removal:  - he reached out to [facility] about 1-2 weeks ago still awaiting call back from them  - requesting us to check on that  Called [facility]([phone]) to check on the scheduling. I was told that someone was working on his appointment request last week. They will call him later today to help schedule.     Pt also sent another MC message as below:  Dr Kauffman, My blood sugar reading this morning is jossie high. 320. Something is very wrong. I need help.     After huddling with , scheduled an OV with Annette tomorrow for an assessment for joint pain & uncontrolled diabetes.     Sent MC message to pt with appointment detail.     Shania  RN  Patient Advocate Liason (PAL)  [facility]  Ph. [phone] / Fax. [phone]

## 2023-07-25 NOTE — TELEPHONE ENCOUNTER
See the  message for an update.    SHAMIR Jauregui  Patient Advocate Liason (PAL)  [facility]  Ph. [phone] / Fax. [phone]

## 2023-07-26 PROBLEM — R06.02 SHORTNESS OF BREATH: Status: ACTIVE | Noted: 2023-01-01

## 2023-07-26 PROBLEM — D62 ANEMIA DUE TO BLOOD LOSS, ACUTE: Status: ACTIVE | Noted: 2023-01-01

## 2023-07-26 PROBLEM — K92.1 GASTROINTESTINAL HEMORRHAGE WITH MELENA: Status: ACTIVE | Noted: 2023-01-01

## 2023-07-26 PROBLEM — M25.50 MULTIPLE JOINT PAIN: Status: ACTIVE | Noted: 2023-01-01

## 2023-07-26 NOTE — ED NOTES
[facility]  ED Nurse Handoff Report    ED Chief complaint: Abnormal Labs and Joint Pain  . ED Diagnosis:   Final diagnoses:   Anemia due to blood loss, acute   Gastrointestinal hemorrhage with melena   Multiple joint pain   Shortness of breath       Allergies:   Allergies   Allergen Reactions    Ace Inhibitors Cough     cough  cough       Code Status:     Activity level - Baseline/Home:  independent.  Activity Level - Current:   assist of 1.   Lift room needed: No.   Bariatric: No   Needed: No   Isolation: No.   Infection: Not Applicable.     Respiratory status: Room air    Vital Signs (within 30 minutes):   Vitals:    07/26/23 1404 07/26/23 1426 07/26/23 1431 07/26/23 1436   BP: 131/75 135/77  124/76   Pulse:  72 93    Resp:  18  17   Temp:  99.2  F (37.3  C)  99.1  F (37.3  C)   TempSrc:    Oral   SpO2: 100%  100% 100%   Weight:       Height:           Cardiac Rhythm:  ,      Pain level:    Patient confused: No.   Patient Falls Risk: patient and family education.   Elimination Status: Has voided     Patient Report - Initial Complaint: Abnormal Labs.   Focused Assessment:  Austin Garza is a 82 year old male with a history of hypertension, hyperlipidemia, inflammatory monoarthritis, chronic lymphocytic leukemia, abdominal aortic aneurysm, anemia, chronic kidney disease, dissection, atrial fibrillation, diabetes, heart disease, thrombocytopenia, and cardiomyopathy presents for evaluation of abnormal labs.The patient states that for the past several months he has had lightheadedness with position changes. About a week ago, he developed pain in his wrists, his fingers, and right knee which is exacerbated by movement as well. He was evaluated at [facility] where they examined his knee and were not concerned about gout or septic arthritis and recommended he follow up with his PCP. Today, the patient was evaluated by his family medicine provider who obtained blood work and noted that his  hemoglobin was low and his glucose was high. He was sent to the ER as his hemoglobin decreased from 9.1-7.7. The patient denies fevers, epistaxis, chest pain, shortness of breath, abdominal pain, hematuria, dysuria, hematemesis, blood in the stool, or melena.     Abnormal Results:   Labs Ordered and Resulted from Time of ED Arrival to Time of ED Departure   COMPREHENSIVE METABOLIC PANEL - Abnormal       Result Value    Sodium 132 (*)     Potassium 4.1      Chloride 99      Carbon Dioxide (CO2) 22      Anion Gap 11      Urea Nitrogen 21.8      Creatinine 1.69 (*)     Calcium 8.8      Glucose 282 (*)     Alkaline Phosphatase 103      AST 21      ALT 22      Protein Total 6.4      Albumin 3.2 (*)     Bilirubin Total 0.5      GFR Estimate 41 (*)    ERYTHROCYTE SEDIMENTATION RATE AUTO - Abnormal    Erythrocyte Sedimentation Rate 86 (*)    CRP INFLAMMATION - Abnormal    CRP Inflammation 195.48 (*)    CBC WITH PLATELETS AND DIFFERENTIAL - Abnormal    WBC Count 5.7      RBC Count 2.79 (*)     Hemoglobin 7.3 (*)     Hematocrit 23.6 (*)     MCV 85      MCH 26.2 (*)     MCHC 30.9 (*)     RDW 15.2 (*)     Platelet Count 72 (*)     % Neutrophils 50      % Lymphocytes 44      % Monocytes 4      % Eosinophils 1      % Basophils 0      % Immature Granulocytes 1      NRBCs per 100 WBC 0      Absolute Neutrophils 2.8      Absolute Lymphocytes 2.5      Absolute Monocytes 0.2      Absolute Eosinophils 0.1      Absolute Basophils 0.0      Absolute Immature Granulocytes 0.0      Absolute NRBCs 0.0     OCCULT BLOOD STOOL - Abnormal    Occult Blood Positive (*)    INR - Abnormal    INR 1.22 (*)    RETICULOCYTE COUNT - Abnormal    % Reticulocyte 2.2 (*)     Absolute Reticulocyte 0.059     IRON AND IRON BINDING CAPACITY - Abnormal    Iron 19 (*)     Iron Binding Capacity 273      Iron Sat Index 7 (*)    COVID-19 VIRUS (CORONAVIRUS) BY PCR - Normal    SARS CoV2 PCR Negative     LACTATE DEHYDROGENASE - Normal    Lactate Dehydrogenase 158      TROPONIN T, HIGH SENSITIVITY   FERRITIN   HAPTOGLOBIN   TYPE AND SCREEN, ADULT    ABO/RH(D) AB POS      SPECIMEN EXPIRATION DATE [id number]     PREPARE RED BLOOD CELLS (UNIT)   ABO/RH TYPE AND SCREEN        No orders to display       Treatments provided: 1 Unit PRBC.  Family Comments: Wife at bedside   OBS brochure/video discussed/provided to patient:    ED Medications:   Medications   pantoprazole (PROTONIX) IV push injection 40 mg (40 mg Intravenous $Given 7/26/23 1346)       Drips infusing:  Yes  For the majority of the shift this patient was Green.   Interventions performed were NA    Sepsis treatment initiated: No    Cares/treatment/interventions/medications to be completed following ED care:     ED Nurse Name: Tatum Miranda RN  1:49 PM  RECEIVING UNIT ED HANDOFF REVIEW    Above ED Nurse Handoff Report was reviewed: Yes  Reviewed by: Ellie Farr RN on July 26, 2023 at 3:34 PM

## 2023-07-26 NOTE — PLAN OF CARE
Assumed care at 1600: patient transferred from ED with blood transfusing due to Hgb 7.3.     A&Ox4, A1 with GB, VSS on RA x BP slightly elevated. Reports generalized joint pain with movement, gave tylenol with some relief. Full liquid diet, NPO at midnight. , insulin given.

## 2023-07-26 NOTE — PROGRESS NOTES
Assessment & Plan     Multiple joint pain    - CBC with platelets and differential; Future  - Comprehensive metabolic panel (BMP + Alb, Alk Phos, ALT, AST, Total. Bili, TP); Future  - Lyme Disease Total Abs Bld with Reflex to Confirm CLIA; Future  - Anti Nuclear Shivani IgG by IFA with Reflex; Future  - Rheumatoid factor; Future  - ESR: Erythrocyte sedimentation rate; Future  - CRP, inflammation; Future  - Uric acid; Future  - CK total; Future  - UA Macroscopic with reflex to Microscopic and Culture - Clinic Collect  - CBC with platelets and differential  - Comprehensive metabolic panel (BMP + Alb, Alk Phos, ALT, AST, Total. Bili, TP)  - Lyme Disease Total Abs Bld with Reflex to Confirm CLIA  - Anti Nuclear Shivani IgG by IFA with Reflex  - Rheumatoid factor  - ESR: Erythrocyte sedimentation rate  - CRP, inflammation  - Uric acid  - CK total    Shortness of breath    - EKG 12-lead complete w/read - Clinics  - BNP-N terminal pro; Future  - Troponin T, High Sensitivity; Future  - XR Chest 2 Views; Future  - BNP-N terminal pro  - Troponin T, High Sensitivity    Elevated blood sugar    - Comprehensive metabolic panel (BMP + Alb, Alk Phos, ALT, AST, Total. Bili, TP); Future    Atherosclerosis of native coronary artery of native heart without angina pectoris    - Lipid panel reflex to direct LDL Non-fasting; Future  - BNP-N terminal pro; Future  - Troponin T, High Sensitivity; Future  - XR Chest 2 Views; Future  - Lipid panel reflex to direct LDL Non-fasting  - BNP-N terminal pro  - Troponin T, High Sensitivity      On exam his hands and fingers hurt and he cannot make a full fist.  His other joints have normal ROM, but are tender with ROM.  He is not having any notable redness or swelling of the joint spaces.  We will be checking lab studies for the patient's multiple joint aches.  He does report that he is more winded with activity, so we are also checking troponin based on his heart history as well.  Patient was advised to  "seek immediate ED care if his symptoms change or worsen in any way.     We will consider followup with MTM about his diabetes management if labs are normal.               BMI:   Estimated body mass index is 30.09 kg/m  as calculated from the following:    Height as of 7/7/23: 1.737 m (5' 8.4\").    Weight as of this encounter: 90.8 kg (200 lb 3.2 oz).     Annette Restrepo PA-C  [facility] CYRUS Leblanc is a 82 year old, presenting for the following health issues:  Joint Pain and Diabetes        7/26/2023     8:38 AM   Additional Questions   Roomed by RHS   Accompanied by self         7/26/2023     8:38 AM   Patient Reported Additional Medications   Patient reports taking the following new medications Trulicity     History of Present Illness       Reason for visit:  Joint pain and high blood sugar  Symptom onset:  1-3 days ago    He eats 2-3 servings of fruits and vegetables daily.He consumes 0 sweetened beverage(s) daily.He exercises with enough effort to increase his heart rate 10 to 19 minutes per day.  He exercises with enough effort to increase his heart rate 3 or less days per week.   He is taking medications regularly.       Pain History:  When did you first notice your pain? About 3 days ago    Have you seen anyone else for your pain? Yes - was seen at [facility] and arthritis and gout was ruled.   How has your pain affected your ability to work? Not applicable  Where in your body do you have pain? Musculoskeletal problem/pain  Onset/Duration: about 3 days ago  Description  Location: knee, fingers, wrist, and ankles - bilateral  Joint Swelling: YES when pain is in maximum   Redness: YES  Pain: YES  Warmth: No  Intensity:  moderate  Progression of Symptoms:  worsening and intermittent  Accompanying signs and symptoms:   Fevers: No  Numbness/tingling/weakness: weakness   History  Trauma to the area: No  Recent illness:  No  Previous similar problem: No  Previous evaluation:  " No  Precipitating or alleviating factors:  Aggravating factors include: walking or bending the knees   Therapies tried and outcome: tylenol 2 tabs 3x a day     Diabetes Follow-up      How often are you checking your blood sugar? One time daily    What time of day are you checking your blood sugars (select all that apply)?  Before meals    Have you had any blood sugars above 200?  Yes above 300 last 2 days     Have you had any blood sugars below 70?  No    What symptoms do you notice when your blood sugar is low?  None    What concerns do you have today about your diabetes? Blood sugar is often over 200     Do you have any of these symptoms? (Select all that apply)  No numbness or tingling in feet.  No redness, sores or blisters on feet.  No complaints of excessive thirst.  No reports of blurry vision.  No significant changes to weight.      BP Readings from Last 2 Encounters:   07/26/23 126/62   07/07/23 122/64     Hemoglobin A1C (%)   Date Value   07/07/2023 7.1 (H)   08/02/2022 6.4 (H)   04/05/2021 5.9 (H)   01/15/2021 6.7 (H)     LDL Cholesterol Calculated (mg/dL)   Date Value   02/15/2022 58   04/05/2021 96   01/15/2021 27             Patient reports that he woke up on Saturday morning to 5 painful joints.  He reports that the knees, ankles and both wrists were affected.  He took 1,000 mg of tylenol 2 times that day without any relief.  On Sunday he seems to have more joint issues.  He took ibuprofen and that seemed to help him much better.  On Monday he went to [facility] and was told this is not gout or arthritis.  They specifically looked at the knees, and not the other joints.  He was advised to followup here.  He is having continued joint pain, and elevated blood sugars.  He has not had any medication changes to his diabetes care and is unsure why his sugars are all of a sudden elevated.            Review of Systems   Constitutional, HEENT, cardiovascular, pulmonary, gi and gu systems are negative, except as  otherwise noted.      Objective    /62 (BP Location: Right arm, Patient Position: Sitting, Cuff Size: Adult Large)   Pulse 97   Temp 97.3  F (36.3  C) (Temporal)   Resp 16   Wt 90.8 kg (200 lb 3.2 oz)   SpO2 98%   BMI 30.09 kg/m    Body mass index is 30.09 kg/m .  Physical Exam   GENERAL: healthy, alert and no distress  NECK: no adenopathy, no asymmetry, masses, or scars and thyroid normal to palpation  RESP: lungs clear to auscultation - no rales, rhonchi or wheezes  CV: regular rate and rhythm, normal S1 S2, no S3 or S4, no murmur, click or rub, no peripheral edema and peripheral pulses strong  MS: no gross musculoskeletal defects noted, no edema    Labs: Pending  EKG:  Unremarkable  Chest XR:  Unremarkable    Annette Restrepo PA-C

## 2023-07-26 NOTE — ED PROVIDER NOTES
Emergency Department Attending Supervision Note  7/26/2023  12:17 PM      I evaluated this patient in conjunction with Fabiola Bar PA-C.      Briefly, the patient presented with joint pian in wrists, fingers and right knee about a week ago, was seen at [facility] who told him to follow up with PCP. They didn't find septic arthritis or gout but noticed the patient had low hemoglobin and directed to ED. He mentions some light-headedness when changing positions. He denies any abdominal pain, black stools, blood in stool, nausea.    On my exam, abdominal exam is benign.  Lungs are clear to auscultation.  No focal joint effusions or redness or warmth.  He does report tenderness about the bilateral knees and bilateral wrists.    Results:    Labs Ordered and Resulted from Time of ED Arrival to Time of ED Departure   COMPREHENSIVE METABOLIC PANEL - Abnormal       Result Value    Sodium 132 (*)     Potassium 4.1      Chloride 99      Carbon Dioxide (CO2) 22      Anion Gap 11      Urea Nitrogen 21.8      Creatinine 1.69 (*)     Calcium 8.8      Glucose 282 (*)     Alkaline Phosphatase 103      AST 21      ALT 22      Protein Total 6.4      Albumin 3.2 (*)     Bilirubin Total 0.5      GFR Estimate 41 (*)    ERYTHROCYTE SEDIMENTATION RATE AUTO - Abnormal    Erythrocyte Sedimentation Rate 86 (*)    CRP INFLAMMATION - Abnormal    CRP Inflammation 195.48 (*)    CBC WITH PLATELETS AND DIFFERENTIAL - Abnormal    WBC Count 5.7      RBC Count 2.79 (*)     Hemoglobin 7.3 (*)     Hematocrit 23.6 (*)     MCV 85      MCH 26.2 (*)     MCHC 30.9 (*)     RDW 15.2 (*)     Platelet Count 72 (*)     % Neutrophils 50      % Lymphocytes 44      % Monocytes 4      % Eosinophils 1      % Basophils 0      % Immature Granulocytes 1      NRBCs per 100 WBC 0      Absolute Neutrophils 2.8      Absolute Lymphocytes 2.5      Absolute Monocytes 0.2      Absolute Eosinophils 0.1      Absolute Basophils 0.0      Absolute Immature Granulocytes 0.0       Absolute NRBCs 0.0     TROPONIN T, HIGH SENSITIVITY - Abnormal    Troponin T, High Sensitivity 63 (*)    OCCULT BLOOD STOOL - Abnormal    Occult Blood Positive (*)    INR - Abnormal    INR 1.22 (*)    RETICULOCYTE COUNT - Abnormal    % Reticulocyte 2.2 (*)     Absolute Reticulocyte 0.059     IRON AND IRON BINDING CAPACITY - Abnormal    Iron 19 (*)     Iron Binding Capacity 273      Iron Sat Index 7 (*)    COVID-19 VIRUS (CORONAVIRUS) BY PCR - Normal    SARS CoV2 PCR Negative     LACTATE DEHYDROGENASE - Normal    Lactate Dehydrogenase 158     FERRITIN   HAPTOGLOBIN   TYPE AND SCREEN, ADULT    ABO/RH(D) AB POS      Antibody Screen Negative      SPECIMEN EXPIRATION DATE [id number]     PREPARE RED BLOOD CELLS (UNIT)    Blood Component Type Red Blood Cells      Product Code [id number]      Unit Status Transfused      Unit Number [id number]      CROSSMATCH Compatible      CODING SYSTEM [id number]      ISSUE DATE AND TIME [id number]      UNIT ABO/RH AB+      UNIT TYPE ISBT 8400     PREPARE RED BLOOD CELLS (UNIT)   TRANSFUSE RED BLOOD CELLS (UNIT)   ABO/RH TYPE AND SCREEN     MDM:  Patient presents to the ER for evaluation of worsening anemia, polyarthralgia.  Vital signs reassuring.  Hemoglobin is 7.3, down a few points over the past 2 weeks.  With history of CAD, he was consented for blood transfusion.  Patient had bowel movement in the ER which was mixed with bright red blood.  Hemoccult testing is positive.  No signs of external hemorrhoid on rectal exam.  He has mild troponin elevation which is stable on recheck.  No specific ischemic change on ECG or history supportive of ACS.  With signs of GI bleed and progressive anemia in the short-term, he was admitted to the hospitalist team for further evaluation.        Diagnosis    ICD-10-CM    1. Anemia due to blood loss, acute  D62       2. Gastrointestinal hemorrhage with melena  K92.1       3. Multiple joint pain  M25.50       4. Shortness of breath  R06.02              Wilmar Anthony MD Lindenbaum, Elan, MD  07/26/23 6532

## 2023-07-26 NOTE — ED PROVIDER NOTES
History     Chief Complaint:  Abnormal Labs and Joint Pain       HPI   Austin Garza is a 82 year old male with a history of hypertension, hyperlipidemia, inflammatory monoarthritis, chronic lymphocytic leukemia, abdominal aortic aneurysm, anemia, chronic kidney disease, dissection, atrial fibrillation, diabetes, heart disease, thrombocytopenia, and cardiomyopathy presents for evaluation of abnormal labs.The patient states that for the past several months he has had lightheadedness with position changes. About a week ago, he developed pain in his wrists, his fingers, and right knee which is exacerbated by movement as well. He was evaluated at [facility] where they examined his knee and were not concerned about gout or septic arthritis and recommended he follow up with his PCP. Today, the patient was evaluated by his family medicine provider who obtained blood work and noted that his hemoglobin was low and his glucose was high. He was sent to the ER as his hemoglobin decreased from 9.1-7.7. The patient denies fevers, epistaxis, chest pain, shortness of breath, abdominal pain, hematuria, dysuria, hematemesis, blood in the stool, or melena.    Patient was asked about shortness of breath again after noting this complaint in his PCP visit today. The patient's wife states that this has been ongoing for years and     Independent Historian:   None - Patient Only    Review of External Notes:   7/26/23: Evaluated by PCP, assessed for joint pain, shortness of breath, patient advised to present to ER due to hemoglobin decreasing from 9.1-7.7.    Medications:    amLODIPine (NORVASC) 5 MG tablet  blood glucose (CONTOUR NEXT TEST) test strip  blood glucose monitoring (NO BRAND SPECIFIED) meter device kit  clopidogrel (PLAVIX) 75 MG tablet  Dulaglutide 4.5 MG/0.5ML SOPN  metoprolol succinate ER (TOPROL XL) 100 MG 24 hr tablet  nitroGLYcerin (NITROSTAT) 0.4 MG sublingual tablet  pantoprazole (PROTONIX) 40 MG EC tablet  rosuvastatin  (CRESTOR) 20 MG tablet  triamcinolone (KENALOG) 0.1 % external ointment        Past Medical History:    Past Medical History:   Diagnosis Date    AAA (abdominal aortic aneurysm)     BCC (basal cell carcinoma of skin) - face     CLL (chronic lymphocytic leukemia)     Diaphragmatic hernia     Diverticulitis of colon     Esophageal reflux     Essential hypertension     Hyperlipidemia     Irritable bowel syndrome     Macular degeneration (senile) of retina     Mumps     Squamous Cell Carcinoma, Back 1988    Type 2 diabetes mellitus        Past Surgical History:    Past Surgical History:   Procedure Laterality Date    ANESTHESIA CARDIOVERSION N/A 8/2/2021    Procedure: ANESTHESIA, FOR CARDIOVERSION;  Surgeon: GENERIC ANESTHESIA PROVIDER;  Location:  OR    APPENDECTOMY OPEN  1966    BONE MARROW BIOPSY, BONE SPECIMEN, NEEDLE/TROCAR N/A 11/21/2017    Procedure: BIOPSY BONE MARROW;  BONE MARROW BIOPSY;  Surgeon: Shady Garcia MD;  Location:  GI    COLONOSCOPY  2002    CV CORONARY ANGIOGRAM N/A 6/29/2022    Procedure: Coronary Angiogram;  Surgeon: Evaristo Beaulieu MD;  Location:  HEART CARDIAC CATH LAB    ENDOVASCULAR REPAIR ANEURYSM ABDOMINAL AORTA N/A 12/4/2017    Procedure: ENDOVASCULAR REPAIR ANEURYSM ABDOMINAL AORTA;  ENDOVASCULAR REPAIR ANEURYSM ABDOMINAL AORTA;  Surgeon: Milton Cazares MD;  Location:  OR    Fistulotomy with marsupialization for repair of fisture in ano  2007    IR ABDOMINAL AORTOGRAM  3/11/2019    IR VISCERAL EMBOLIZATION  5/13/2019    Multiple skin tags - resection  1998    Squamous cell skin cancer resection - back  1985-90    VASECTOMY          Physical Exam   Patient Vitals for the past 24 hrs:   BP Temp Temp src Pulse Resp SpO2 Height Weight   07/26/23 1436 124/76 99.1  F (37.3  C) Oral -- 17 100 % -- --   07/26/23 1431 -- -- -- 93 -- 100 % -- --   07/26/23 1426 135/77 99.2  F (37.3  C) -- 72 18 -- -- --   07/26/23 1404 131/75 -- -- -- -- 100 % -- --   07/26/23 1359 --  "-- -- -- -- 100 % -- --   07/26/23 1344 (!) 145/88 -- -- -- -- -- -- --   07/26/23 1257 128/71 -- -- -- -- 100 % -- --   07/26/23 1248 -- -- -- -- -- 100 % -- --   07/26/23 1241 -- -- -- -- -- -- 1.727 m (5' 8\") 90.7 kg (200 lb)   07/26/23 1233 125/64 -- -- -- -- -- -- --   07/26/23 1204 -- -- -- 101 -- -- -- --   07/26/23 1203 (!) 151/88 98.3  F (36.8  C) Temporal -- 20 100 % -- --        Physical Exam  General: Alert, appears well-developed and well-nourished. Cooperative.     In mild distress. Pale  HEENT:  Head:  Atraumatic  Ears:  External ears are normal  Mouth/Throat:  Oropharynx is without erythema or exudate and mucous membranes are dry.   Eyes:   Conjunctivae pale and EOM are normal. No scleral icterus.    Pupils are equal, round, and reactive to light.   Neck:   Normal range of motion. Neck supple.  CV:  Normal rate, regular rhythm, normal heart sounds and radial and dorsalis pedis pulses are 2+ and symmetric.  No murmur.  Resp:  Breath sounds are clear bilaterally    Non-labored, no retractions or accessory muscle use  GI:  Abdomen is soft, no distension, no tenderness. No rebound or guarding. Rectal exam: No evidence of external hemorrhoids.  MS:  TTP over bilateral wrists, CMC joints, 1st MCPs. Difficulty making a fist and opposition secondary to pain. 5/5 strength of biceps/triceps. Pale nailbeds, capillary refill < 2 seconds. Peripheral sensation to light touch intact distally. No obvious deformity. Distal CMS intact.    No TTP or overlying skin changes of left knee. Limited flexion and extension secondary to pain. Peripheral sensation to light touch intact distally. Compartments soft and nontender. DP pulse 2+. Distal CMS intact.   No edema.    Back atraumatic.    No midline cervical, thoracic, or lumbar tenderness  Skin:  Warm and dry.  No rash or lesions noted.  Neuro:   Alert. Normal strength.  Sensation intact in all 4 extremities. GCS: 15  Psych: Normal mood and affect.    Emergency Department " Course   Laboratory:  Labs Ordered and Resulted from Time of ED Arrival to Time of ED Departure   COMPREHENSIVE METABOLIC PANEL - Abnormal       Result Value    Sodium 132 (*)     Potassium 4.1      Chloride 99      Carbon Dioxide (CO2) 22      Anion Gap 11      Urea Nitrogen 21.8      Creatinine 1.69 (*)     Calcium 8.8      Glucose 282 (*)     Alkaline Phosphatase 103      AST 21      ALT 22      Protein Total 6.4      Albumin 3.2 (*)     Bilirubin Total 0.5      GFR Estimate 41 (*)    ERYTHROCYTE SEDIMENTATION RATE AUTO - Abnormal    Erythrocyte Sedimentation Rate 86 (*)    CRP INFLAMMATION - Abnormal    CRP Inflammation 195.48 (*)    CBC WITH PLATELETS AND DIFFERENTIAL - Abnormal    WBC Count 5.7      RBC Count 2.79 (*)     Hemoglobin 7.3 (*)     Hematocrit 23.6 (*)     MCV 85      MCH 26.2 (*)     MCHC 30.9 (*)     RDW 15.2 (*)     Platelet Count 72 (*)     % Neutrophils 50      % Lymphocytes 44      % Monocytes 4      % Eosinophils 1      % Basophils 0      % Immature Granulocytes 1      NRBCs per 100 WBC 0      Absolute Neutrophils 2.8      Absolute Lymphocytes 2.5      Absolute Monocytes 0.2      Absolute Eosinophils 0.1      Absolute Basophils 0.0      Absolute Immature Granulocytes 0.0      Absolute NRBCs 0.0     TROPONIN T, HIGH SENSITIVITY - Abnormal    Troponin T, High Sensitivity 63 (*)    OCCULT BLOOD STOOL - Abnormal    Occult Blood Positive (*)    INR - Abnormal    INR 1.22 (*)    RETICULOCYTE COUNT - Abnormal    % Reticulocyte 2.2 (*)     Absolute Reticulocyte 0.059     IRON AND IRON BINDING CAPACITY - Abnormal    Iron 19 (*)     Iron Binding Capacity 273      Iron Sat Index 7 (*)    COVID-19 VIRUS (CORONAVIRUS) BY PCR - Normal    SARS CoV2 PCR Negative     LACTATE DEHYDROGENASE - Normal    Lactate Dehydrogenase 158     FERRITIN   HAPTOGLOBIN   TYPE AND SCREEN, ADULT    ABO/RH(D) AB POS      Antibody Screen Negative      SPECIMEN EXPIRATION DATE [id number]     PREPARE RED BLOOD CELLS (UNIT)     Blood Component Type Red Blood Cells      Product Code [id number]      Unit Status Transfused      Unit Number [id number]      CROSSMATCH Compatible      CODING SYSTEM [id number]      ISSUE DATE AND TIME [id number]      UNIT ABO/RH AB+      UNIT TYPE ISBT 8400     PREPARE RED BLOOD CELLS (UNIT)   TRANSFUSE RED BLOOD CELLS (UNIT)   ABO/RH TYPE AND SCREEN        Procedures   None    Emergency Department Course & Assessments:       Interventions:  Medications   pantoprazole (PROTONIX) IV push injection 40 mg (40 mg Intravenous $Given 7/26/23 1346)      Assessments:  1220: Initial evaluation and assessment.  1425: Patient reassessed, rectal exam performed as noted above.  Discussed plan for admission due to anemia, patient agreed with this plan.    Independent Interpretation (X-rays, CTs, rhythm strip):  None    Consultations/Discussion of Management or Tests:  This patient was seen in conjunction with Dr. Anthony.  1442: Discussed patient with Dr. Nielsen who has agreed accept the patient for admission.    Social Determinants of Health affecting care:   None    Disposition:  The patient was admitted to the hospital under the care of Dr. Nielsen.     Impression & Plan    CMS Diagnoses: None    Medical Decision Making:  Austin Garza is a 82 year old male with a history of hypertension, hyperlipidemia, inflammatory monoarthritis, chronic lymphocytic leukemia, abdominal aortic aneurysm, anemia, chronic kidney disease, dissection, atrial fibrillation, diabetes, heart disease, thrombocytopenia, and cardiomyopathy presents for evaluation of abnormal labs.  The patient presented due to concern for his primary care provider regarding acute anemia and multiple areas of joint pain.  On exam, the patient is noted to have tenderness over his bilateral wrist hands and right knee without any overlying skin changes or concerns for recent trauma/injury.  The patient is noted to be hypertensive while in the ED with otherwise  reassuring vital signs.  Blood work was obtained for further evaluation of anemia which is notable for hemoglobin of 7.3, blood in the stool, low platelets, and elevated inflammatory markers.  Rectal exam does not reveal any external hemorrhoids.  Serial troponins show decrease and resolves and is likely due to demand ischemia.  EKG does not show any concerning signs of ST changes or arrhythmia.  We provided the patient with red blood cells she was consented for and Protonix.  Given evidence of acute anemia and GI bleed, we recommended the patient be admitted for ongoing monitoring of blood loss and GI consult.  This was discussed with Dr. Nielsen, who agreed to accept the patient for admission.  This was discussed with the patient and his wife as well who are in agreement with this plan.      Diagnosis:    ICD-10-CM    1. Anemia due to blood loss, acute  D62       2. Gastrointestinal hemorrhage with melena  K92.1       3. Multiple joint pain  M25.50       4. Shortness of breath  R06.02              Fabiola Bar PA-C  7/26/2023        Fabiola Bar PA-C  07/26/23 1536

## 2023-07-26 NOTE — H&P
[facility]  Hospitalist H&P    Name: Austin Garza      MRN: [id number]  YOB: 1940    Age: 82 year old  Date of admission: 7/26/2023  Primary care provider: Luma Kauffman           Assessment and Plan:     Austin Garza is a 82 year old male with PMH of HTN, hyperlipidemia, inflammatory monoarthritis, chronic lymphocytic leukemia, abdominal aortic aneurysm, anemia, chronic kidney disease, atrial fibrillation, diabetes, CAD, thrombocytopenia, idiopathic pericarditis who presents to ED from primary care's office for evaluation of abnormal labs and pain in multiple joints.    Acute on chronic iron deficiency anemia.  GI bleeding suspected chronic blood loss.  -Patient has chronic anemia, iron level is low.  -History of CLL, currently not on treatment and WBC is normal.  -Being transfused with 1 unit of packed red blood cells.  -Transfuse for hemoglobin less than 7.5.  -Occult blood is positive  -N.p.o. after midnight.  -Hold Plavix for now.  -Check hemoglobin posttransfusion.  -Gentle hydration with IV fluid when n.p.o. after midnight.  -GI will be consulted in case he needs EGD/colonoscopy.  -Monitor for any more bleeding.  -Patient already on Protonix 40 mg daily will change to twice daily.    Migratory polyarthritis.  -Patient had history of inflammatory monoarthritis in the past.  -This time is migratory involving his ankle, knee, wrist joints.  -ESR and CRP are elevated.  -He was evaluated at [facility], ruled out gout or septic arthritis clinically.  -No joint effusion, redness or swelling.  -The wrist pains are mildly tender on the medial side.  -Work-up in progress, follow-up pending results including Lyme titer, rheumatoid factor, KVE, uric acid, CK level.  -Blood cultures x2 ordered  -May consider low-dose steroid if this is deemed inflammatory and no infection.    NINA on chronic kidney disease stage III.  -Baseline creatinine is 1.4, today is 1.69.  -Gentle  "hydration.  -Avoid NSAIDs.  -Check BMP in the morning.    Atherosclerotic cardiovascular disease/severe CAD/AAA  -Currently patient is on Plavix.  -He had AAA repair.  -Continue metoprolol.  -History of multivessel coronary artery disease and had PCI, the latest one in November 2022.  He also successfully completed cardiac rehab in April 2023.  -Echocardiogram done in December 2021 showed EF of 59% of the time there was no regional wall motion abnormalities.  I did not see any echocardiogram done post PCI.  -No need to repeat echo at this time, as he has no cardiac issues on this presentation.    Type II DM.  -On Trulicity every week.  -Will start on sliding scale insulin.    Paroxysmal atrial fibrillation.  -Continue Toprol- mg daily.  -Continue telemetry monitoring.    Thrombocytopenia.  History of CLL.,currently stable  -History of CLL, was on treatment in the past, currently not on any treatment for disease.  -Platelets is 72.  -No skin rash or ecchymosis.  -Held Plavix.          Clinically Significant Risk Factors Present on Admission              # Hypoalbuminemia: Lowest albumin = 3.2 g/dL at 7/26/2023 12:31 PM, will monitor as appropriate  # Coagulation Defect: INR = 1.22 (Ref range: 0.85 - 1.15) and/or PTT = N/A, will monitor for bleeding  # Drug Induced Platelet Defect: home medication list includes an antiplatelet medication   # Hypertension: Noted on problem list           # DMII: A1C = 7.1 % (Ref range: 0.0 - 5.6 %) within past 6 months    # Obesity: Estimated body mass index is 30.41 kg/m  as calculated from the following:    Height as of this encounter: 1.727 m (5' 8\").    Weight as of this encounter: 90.7 kg (200 lb).          Code status: Full code  Admit to inpatient  Prophylaxis: PCD    Discussed with patient at length the plan of care, all his question and concerns addressed showed understanding.          Chief Complaint:     Multiple joints pain and anemia         History of Present " Illness:   Austin Garza is a 82 year old male PMH of HTN, hyperlipidemia, inflammatory monoarthritis, chronic lymphocytic leukemia, abdominal aortic aneurysm, anemia, chronic kidney disease, atrial fibrillation, diabetes, CAD, thrombocytopenia, who presents to ED from primary care's office for evaluation of abnormal labs and pain in multiple joints  Patient has had ongoing lightheadedness with position change for several months.  He denied any chest pain, palpitation or shortness of breath associated with it.  A week ago he developed pain in his knees, migrated to ankle joints and into the wrist and also to some extent to his elbows.  The joint pain was migratory when worse at the new joint it gets better after previous sites.  This time the pain is worse in his wrists.  He was evaluated at [facility] where they examined him and ruled out concern for gout or septic arthritis and recommended to follow-up with primary care provider.  Today patient was evaluated by his PCP who obtained blood work and noted hemoglobin was low at 7.7, baseline was above 9.  Sent to the emergency department for further evaluation and transfusion.  Patient denied any fever, chills, skin rash, bleeding from any site.  In the ED it was noted he had blood mixed with the stool.  Patient is on Plavix after he had AAA repair.     In ED he was evaluated, glucose was 382, , , repeat hemoglobin 7.3, 3 weeks ago it was 9.1.  Occult blood is positive, iron level was low, troponin initially was 68 and repeat was 63.  In ED patient was given IV Protonix and being transfused with 1 unit packed red blood cell.    History was obtained from my discussion with the patient at the bedside.  I also discussed the case with the ED provider.  The electronic medical record was also reviewed.            Past Medical History:     Past Medical History:   Diagnosis Date    AAA (abdominal aortic aneurysm)     BCC (basal cell carcinoma of skin) - face     CLL  (chronic lymphocytic leukemia)     Diaphragmatic hernia     Diverticulitis of colon     Esophageal reflux     Essential hypertension     Hyperlipidemia     Irritable bowel syndrome     Macular degeneration (senile) of retina     Mumps     Squamous Cell Carcinoma, Back     Type 2 diabetes mellitus              Past Surgical History:     Past Surgical History:   Procedure Laterality Date    ANESTHESIA CARDIOVERSION N/A 2021    Procedure: ANESTHESIA, FOR CARDIOVERSION;  Surgeon: GENERIC ANESTHESIA PROVIDER;  Location: [facility] OR    APPENDECTOMY OPEN  1966    BONE MARROW BIOPSY, BONE SPECIMEN, NEEDLE/TROCAR N/A 2017    Procedure: BIOPSY BONE MARROW;  BONE MARROW BIOPSY;  Surgeon: Shady Garcia MD;  Location:  GI    COLONOSCOPY      CV CORONARY ANGIOGRAM N/A 2022    Procedure: Coronary Angiogram;  Surgeon: Evaristo Beaulieu MD;  Location:  HEART CARDIAC CATH LAB    ENDOVASCULAR REPAIR ANEURYSM ABDOMINAL AORTA N/A 2017    Procedure: ENDOVASCULAR REPAIR ANEURYSM ABDOMINAL AORTA;  ENDOVASCULAR REPAIR ANEURYSM ABDOMINAL AORTA;  Surgeon: Milton Cazares MD;  Location:  OR    Fistulotomy with marsupialization for repair of fisture in ano      IR ABDOMINAL AORTOGRAM  3/11/2019    IR VISCERAL EMBOLIZATION  2019    Multiple skin tags - resection  1998    Squamous cell skin cancer resection - back      VASECTOMY               Social History:     Social History     Tobacco Use    Smoking status: Former     Packs/day: 0.50     Years: 20.00     Pack years: 10.00     Types: Cigarettes     Quit date: 1975     Years since quittin.5    Smokeless tobacco: Former   Substance Use Topics    Alcohol use: Yes     Alcohol/week: 10.0 - 14.0 standard drinks of alcohol     Types: 10 - 14 Standard drinks or equivalent per week     Comment: 3 drinks a day/wine             Family History:   The family history was fully reviewed and non-contributory in this case.          "Allergies:     Allergies   Allergen Reactions    Ace Inhibitors Cough     cough  cough             Medications:     Prior to Admission medications    Medication Sig Last Dose Taking? Auth Provider Long Term End Date   clopidogrel (PLAVIX) 75 MG tablet Take 1 tablet (75 mg) by mouth daily 7/26/2023 Yes Luma Kauffman MD Yes    Dulaglutide 4.5 MG/0.5ML SOPN Inject 4.5 mg Subcutaneous once a week 7/24/2023 Yes Luma Kauffman MD     metoprolol succinate ER (TOPROL XL) 100 MG 24 hr tablet Take 1 tablet (100 mg) by mouth daily 7/26/2023 Yes Luma Kauffman MD Yes    nitroGLYcerin (NITROSTAT) 0.4 MG sublingual tablet One tablet under the tongue every 5 minutes if needed for chest pain. May repeat every 5 minutes for a maximum of 3 doses in 15 minutes\"  Yes Evaristo Beaulieu MD Yes    pantoprazole (PROTONIX) 40 MG EC tablet Take 1 tablet (40 mg) by mouth daily 7/26/2023 Yes Luma Kauffman MD     rosuvastatin (CRESTOR) 20 MG tablet Take 1 tablet (20 mg) by mouth daily 7/26/2023 Yes Vanessa Munson MD Yes              Review of Systems:     A Comprehensive greater than 10 system review of systems was carried out.  Pertinent positives and negatives are noted above.  Otherwise negative for contributory information.           Physical Exam:   Blood pressure (!) 148/80, pulse 98, temperature 98.9  F (37.2  C), temperature source Oral, resp. rate 17, height 1.727 m (5' 8\"), weight 90.7 kg (200 lb), SpO2 99 %.  Wt Readings from Last 1 Encounters:   07/26/23 90.7 kg (200 lb)     Exam:  GENERAL: No apparent distress. Awake, alert, and fully oriented.  HEENT: Normocephalic, atraumatic. Extraocular movements intact.  CARDIOVASCULAR: Regular rate and rhythm without murmurs or rubs. No S3.  PULMONARY: Clear to auscultation bilaterally.  ABDOMINAL: Soft, non-tender, non-distended. Bowel sounds normoactive.   EXTREMITIES: No cyanosis or clubbing. No appreciable edema.  NEUROLOGICAL: CN 2-12 grossly intact, no " focal neurological deficits.  DERMATOLOGICAL: No rash, ulcer, bruising, nor jaundice.          Data:   EKG:  Personally reviewed.     Laboratory:  Recent Labs   Lab 07/26/23  1231   WBC 5.7   HGB 7.3*   HCT 23.6*   MCV 85   PLT 72*     Recent Labs   Lab 07/26/23  1231   *   POTASSIUM 4.1   CHLORIDE 99   CO2 22   ANIONGAP 11   *   BUN 21.8   CR 1.69*   GFRESTIMATED 41*   MAGDALENO 8.8     Recent Labs   Lab 07/26/23  1231   *     No results for input(s): CULT in the last 168 hours.    Imaging:  Recent Results (from the past 24 hour(s))   XR Chest 2 Views    Narrative    CHEST 2 VIEWS   7/26/2023 10:19 AM     HISTORY: Atherosclerosis of native coronary artery of native heart  without angina pectoris; Shortness of breath.    COMPARISON: 12/14/2017.      Impression    IMPRESSION: No acute cardiopulmonary disease.    OPAL MIN MD         SYSTEM ID:  [id number]       Henrry Ma MD  Formerly [facility] Hospitalist  [facility] [place] [address]  07/26/2023

## 2023-07-26 NOTE — PHARMACY-ADMISSION MEDICATION HISTORY
"Pharmacist Admission Medication History    Admission medication history is complete. The information provided in this note is only as accurate as the sources available at the time of the update.    Medication reconciliation/reorder completed by provider prior to medication history? No    Information Source(s): Patient via in-person    Changes made to PTA medication list:  Added: None  Deleted: amlodipine - stopped 2 weeks ago d/t dizziness, triamcinolone  Changed: None    Medication Affordability:  Not including over the counter (OTC) medications, was there a time in the past 3 months when you did not take your medications as prescribed because of cost?: No    Allergies reviewed with patient and updates made in EHR: yes    Medication History Completed By: Carlos Montero [facility] 7/26/2023 3:05 PM    Prior to Admission medications    Medication Sig Last Dose Taking? Auth Provider Long Term End Date   clopidogrel (PLAVIX) 75 MG tablet Take 1 tablet (75 mg) by mouth daily 7/26/2023 Yes Luma Kauffman MD Yes    Dulaglutide 4.5 MG/0.5ML SOPN Inject 4.5 mg Subcutaneous once a week 7/24/2023 Yes Luma Kauffman MD     metoprolol succinate ER (TOPROL XL) 100 MG 24 hr tablet Take 1 tablet (100 mg) by mouth daily 7/26/2023 Yes Luma Kauffman MD Yes    nitroGLYcerin (NITROSTAT) 0.4 MG sublingual tablet One tablet under the tongue every 5 minutes if needed for chest pain. May repeat every 5 minutes for a maximum of 3 doses in 15 minutes\"  Yes Evaristo Beaulieu MD Yes    pantoprazole (PROTONIX) 40 MG EC tablet Take 1 tablet (40 mg) by mouth daily 7/26/2023 Yes Luma Kauffman MD     rosuvastatin (CRESTOR) 20 MG tablet Take 1 tablet (20 mg) by mouth daily 7/26/2023 Yes Vanessa Munson MD Yes        "

## 2023-07-26 NOTE — PROGRESS NOTES
Received consult for acute on chronic iron deficiency anemia and suspected lower GI bleeding.    Colonoscopy done February 8, 2022 showed a large 26 to 30 mm polyp in the transverse colon which was not removed and was tattooed.  One 6 mm polyp was removed from the sigmoid colon.  Diverticulosis was found in the sigmoid and descending colon and internal hemorrhoids were found.    Colonoscopy for removal of large polyp was scheduled on 3/18/2022.  Colonoscopy showed a large amount of semisolid stool found in the entire colon precluding visualization.  Recommended repeat colonoscopy with a double prep at next available appointment for retreatment.  It appears that this has never been rescheduled.    Therefore, 2 day colon prep will likely be needed for any future colonoscopy.    Full consult to follow in the morning.

## 2023-07-26 NOTE — PROGRESS NOTES
Patient Transfer Information  Patient connected to monitoring equipment on arrival: yes Vital signs monitor     Patient connected to wall oxygen on arrival: N/A    Belongings: Transferred with patient    Safety check completed: Yes

## 2023-07-26 NOTE — ED TRIAGE NOTES
"Pt here with c/o \"all my joints of my body ache\" since Sat. Pt seen at clinic today and told iron is low and BS high, unsure of results. Still in process at clinic. Denies recent dark stools. On Plavix. AbC intact.             "

## 2023-07-27 NOTE — CONSULTS
[facility] ACUTE PAIN SERVICE CONSULTATION   [facility]   Text Page Pain Via [employer] [facility]    Date of Admission:  7/26/2023  Date of Consult (When I saw the patient): 07/27/23  Physician requesting consult: Augusto Perdomo DO   Service:Hospitalist  Reason for consult:  acute pain management      Assessment/Plan:     Austin Garza is a 83 year old male who was admitted on 7/26/2023.    I was asked to see the patient for acute pain management with pain type musculoskeletal related to inflammatory process. History of pain began July 22 nd with c/o multi joint and and hand pain. He was sent to ED by his PCP for also abnormal labs including elevated CRP, +RF, anemia.  His pain continued to increase in severity and involving more joints and he went to urgent care with [facility], given recommendations for over the counter diclofenac and follow up with PCP.  Describes pain as sharp, aching and tender rating 3-5/10.. The patient denies shortness of breath, chest pain, changes in mental status. The patient does not smoke and no chemical dependency history. Renal function is reduced Opioid tolerance is naive.     Opioid Induced Respiratory Depression Risk Assessment:  (Low 0-1; Moderate 2-4; or High >4 or >/= 3 if two of the risk factors are age > 60 and opioid naive) due to the following risk factors: MAGDALENA, COPD/Asthma/pulmonary disease, CHF, renal dysfunction, hepatic dysfunction, Obesity, Smoker, Age>60, >2 opioid therapies, concomitant CNS depressants, opioid naive status, or post surgical ?   Chronic opioid use = 0 mg MME, opioid used this past 24 hours 0.     PLAN:     1) Pain us consistent with musculoskeletal related to inflammatory process, secondary to likely new onset of rheumatoid arthritis. Treatment plan includes multimodal pain approach, medications as listed below, reviewed.  Discharge medications, advised keeping track of doses, educated on tapering off as pain improves, watch for  "constipation and stool softeners, no driving or alcohol with opioids, store medications in a safe place, advised to take no more than the prescribed dose as increased doses may cause respiratory depression or death. Patient is understanding of the plan. All questions and concerns addressed to patient's satisfaction.   2)Multimodal Medication Therapy  Topical: Diclofenac gel with Lidocaine 5 % ointment Apply to  tender areas QID.   Adjuvants: CrCl is 43.1 mg/dl and Tylenol 975 mg every 8 hours\", Hydroxyzine 10 mg every 6 hours hold for sedation\" Gabapentin 100 mg 3 times daily, Muscle relaxer's not indicated. \"  Antidepressants/anxiolytics: Not indicated  Opioids:Hydromorphone(Dilaudid) 2 mg every 4 hours as needed pain  IV Pain medication: Dilaudid 0.2 to 0.4 mg every 2 hours as needed pain and Try to minimize & give po first for tonight when n.p.o. please use  3)Non-medication interventions- Ice, Heat, physical therapy, distraction aroma therapy  4)Constipation Prophylaxis- senna and miralax . Continue to monitor.   5) Follow up   -acute pain team will continue to follow   -Opioid prescriber has been none. PCP is Luma Kauffman.    -Discharge Recommendations - We recommend prescribing the following at the time of discharge:   Continue Prednisone   Dilaudid 2 mg 1/2 tablet(s) every 4 hrs prn #10  Diclofenac gel ( has at home) lidocaine ointment qid prn    Hydroxyzine 10 mg every 6 hours as needed  Follow-up should be considered with rheumatology.  Disposition: Home    Prescribing at discharge: Hospitalist       History of Present Illness (HPI):       Austin Garza is a 83 year old male who presents to the ED on the advice of his primary care provider with abnormal labs and a 6-day history of increasing joint pain.  The patient reports pain that is in his joints mainly wrists, hands, hips, and feet and does not radiate.  Current pain is rated at 4/10 and goal is 4/10. 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  Abnormal lab findings are consistent with low hemoglobin, elevated C-reactive protein elevated rheumatoid factor, thrombocytopenia.  The patient does not have a history of chronic pain . [place]  [place] noting no controlled substances. The patient has a naïve opioid tolerance. Opioid induced side effects including sedation, respiratory suppression, nausea, and constipation are not noted. The patient does not history of MAGDALENA or history of substance use disorder.     Discussed multimodal interventions, interventions other than systemic pharmacologic treatments     Review of medical record/Summary of labs in epic.      Past pain treatments have included none    Last UDS none       [place] -[place] from system on 07/27/23.  No controlled substances in the last 12 months.     Medical History   has a past medical history of AAA (abdominal aortic aneurysm), BCC (basal cell carcinoma of skin) - face, CLL (chronic lymphocytic leukemia), Diaphragmatic hernia, Diverticulitis of colon, Esophageal reflux, Essential hypertension, Hyperlipidemia, Irritable bowel syndrome, Macular degeneration (senile) of retina, Mumps, Squamous Cell Carcinoma, Back (1988), and Type 2 diabetes mellitus.    He has no past medical history of Asymptomatic human immunodeficiency virus (HIV) infection status (H), Blood in semen, Cerebral palsy (H), Complication of anesthesia, Congenital renal agenesis and dysgenesis, Epididymitis, bilateral, Epididymitis, left, Epididymitis, right, Goiter, Gout, Hernia, abdominal, History of spinal cord injury, History of thrombophlebitis, Horseshoe kidney, Hydrocephalus (H), Malignant hyperthermia, Orchitis, epididymitis, and epididymo-orchitis, with abscess, Palpitations, Parkinsons disease (H), Penile discharge, Prostate infection, Spider  "veins, Spina bifida (H), STD (sexually transmitted disease), Swelling of testicle, Tethered cord (H), or Tuberculosis.       Surgical History   has a past surgical history that includes colonoscopy (2002); Bone marrow biopsy, bone specimen, needle/trocar (N/A, 11/21/2017); Endovascular repair aneurysm abdominal aorta (N/A, 12/4/2017); IR Abdominal Aortogram (3/11/2019); IR Visceral Embolization (5/13/2019); Squamous cell skin cancer resection - back (1985-90); Multiple skin tags - resection (1998); Fistulotomy with marsupialization for repair of fisture in ano (2007); Appendectomy open (1966); Anesthesia cardioversion (N/A, 8/2/2021); Vasectomy; and Coronary Angiogram (N/A, 6/29/2022).     Allergies     Allergies   Allergen Reactions    Ace Inhibitors Cough     cough  cough        Current Home Medications   Prior to Admission medications    Medication Sig Start Date End Date Taking? Authorizing Provider   clopidogrel (PLAVIX) 75 MG tablet Take 1 tablet (75 mg) by mouth daily 5/9/23  Yes Luma Kauffman MD   Dulaglutide 4.5 MG/0.5ML SOPN Inject 4.5 mg Subcutaneous once a week 5/30/23  Yes Luma Kauffman MD   metoprolol succinate ER (TOPROL XL) 100 MG 24 hr tablet Take 1 tablet (100 mg) by mouth daily 6/19/23  Yes Luma Kauffman MD   nitroGLYcerin (NITROSTAT) 0.4 MG sublingual tablet One tablet under the tongue every 5 minutes if needed for chest pain. May repeat every 5 minutes for a maximum of 3 doses in 15 minutes\" 6/30/22  Yes Evaristo Beaulieu MD   pantoprazole (PROTONIX) 40 MG EC tablet Take 1 tablet (40 mg) by mouth daily 6/8/23  Yes Luma Kauffman MD   rosuvastatin (CRESTOR) 20 MG tablet Take 1 tablet (20 mg) by mouth daily 8/3/22  Yes Vanessa Munson MD          Social History  Reviewed, and he  reports that he quit smoking about 48 years ago. His smoking use included cigarettes. He has a 10.00 pack-year smoking history. He has quit using smokeless tobacco. He reports current " alcohol use of about 10.0 - 14.0 standard drinks of alcohol per week. He reports that he does not use drugs.      Family History- Reviewed care everywhere to find family history nothing relevant to pain consult    Reviewed, and family history includes C.A.D. in his mother; Cancer in his mother; Cerebrovascular Disease in his father; Eye Disorder in his mother; Hypertension in his mother.    Review of Systems  Complete ROS reviewed, unless noted  , all other systems reviewed (with patient) and all others found to be negative.         Objective:     Vitals:  B/P: 142/84, T: 98, P: 98, R: 18     Weight:   196 lbs 1.6 oz  Body mass index is 28.96 kg/m .      Physical Exam:     General Appearance:  Alert, cooperative, no distress, appears stated age patient is pleasant  Patient is able to make needs known   Head:  Normocephalic, without obvious abnormality, atraumatic   Eyes:  Pupils are EOMI's   ENT/Throat: Lips and mouth are moist   Lymph/Neck: Supple, symmetrical, trachea midline, no adenopathy, thyroid: not enlarged, symmetric    Lungs:   Clear to auscultation bilaterally, respirations unlabored   Chest Wall:  No tenderness or deformity   Cardiovascular/Heart:  Regular rate and rhythm, S1, S2, hypertensive no edema   Abdomen:   Soft, non-tender, bowel sounds active all four quadrants,  no masses, no organomegaly   Musculoskeletal: Extremities normal, atraumatic, point tenderness to palpation in the palm of the hands, feet metatarsals and metacarpals, both wrists.  Incision none   Skin: Skin color good, lesions none   Neurologic: Alert and oriented X 3, Moves all 4 extremities, symmetric strength x4          Imaging Reviewed Personally By Myself     No results found for this visit on 07/26/23.     Labs Reviewed Personally By Myself    Sodium   Date Value Ref Range Status   07/27/2023 131 (L) 136 - 145 mmol/L Final   06/01/2021 136 133 - 144 mmol/L Final     Potassium   Date Value Ref Range Status   07/27/2023 3.8 3.4  - 5.3 mmol/L Final   08/02/2022 3.9 3.4 - 5.3 mmol/L Final   06/01/2021 4.2 3.4 - 5.3 mmol/L Final     Chloride   Date Value Ref Range Status   07/27/2023 100 98 - 107 mmol/L Final   08/02/2022 103 94 - 109 mmol/L Final   06/01/2021 104 94 - 109 mmol/L Final     Carbon Dioxide   Date Value Ref Range Status   06/01/2021 30 20 - 32 mmol/L Final     Carbon Dioxide (CO2)   Date Value Ref Range Status   07/27/2023 21 (L) 22 - 29 mmol/L Final   08/02/2022 24 20 - 32 mmol/L Final     Anion Gap   Date Value Ref Range Status   07/27/2023 10 7 - 15 mmol/L Final   08/02/2022 10 3 - 14 mmol/L Final   06/01/2021 2 (L) 3 - 14 mmol/L Final     Glucose   Date Value Ref Range Status   08/02/2022 249 (H) 70 - 99 mg/dL Final   06/01/2021 215 (H) 70 - 99 mg/dL Final     GLUCOSE BY METER POCT   Date Value Ref Range Status   07/27/2023 220 (H) 70 - 99 mg/dL Final     Urea Nitrogen   Date Value Ref Range Status   07/27/2023 17.8 8.0 - 23.0 mg/dL Final   08/02/2022 20 7 - 30 mg/dL Final   06/01/2021 26 7 - 30 mg/dL Final     Creatinine   Date Value Ref Range Status   07/27/2023 1.56 (H) 0.67 - 1.17 mg/dL Final   06/01/2021 1.51 (H) 0.66 - 1.25 mg/dL Final     GFR Estimate   Date Value Ref Range Status   07/27/2023 44 (L) >60 mL/min/1.73m2 Final   06/01/2021 43 (L) >60 mL/min/[1.73_m2] Final     Comment:     Non  GFR Calc  Starting 12/18/2018, serum creatinine based estimated GFR (eGFR) will be   calculated using the Chronic Kidney Disease Epidemiology Collaboration   (CKD-EPI) equation.       Calcium   Date Value Ref Range Status   07/27/2023 8.1 (L) 8.8 - 10.2 mg/dL Final   06/01/2021 8.9 8.5 - 10.1 mg/dL Final            Please see A&P for additional details of medical decision making.  MANAGEMENT DISCUSSED with the following over the past 24 hours: Juan Luis Jackson RN, Ritu Mejia,     NOTE(S)/MEDICAL RECORDS REVIEWED over the past 24 hours: yes   Tests personally interpreted in the past 24 hours:  - EKG tracing  showing SR PAC normal QTC, echo normal  - ABDOMINAL XRAY showing no interval changes   Tests ORDERED & REVIEWED in the past 24 hours:  - See lab/imaging results included in the data section of the note  - CMP  - CBC  SUPPLEMENTAL HISTORY, in addition to the patient's history, over the past 24 hours obtained from:   - Spouse or significant other  Medical complexity over the past 24 hours:  - Parenteral (IV) CONTROLLED SUBSTANCES ordered  - Decision regarding ESCALATION OF LEVEL OF CARE  - Prescription DRUG MANAGEMENT performed  65 5 MINUTES SPENT BY ME on the date of service doing chart review, history, exam, documentation & further activities per the note.    Thank you for this consultation.      Maria Isabel Randall RN, PGMT-BC APRN,CNP,ACHPN   Acute Pain Team ( SD/RH) 8-4:30 after 3:30 page house officer   No weekend coverage   [facility] Paging/Directory Pain  Securely message with the Vocera Web Console (learn more here)

## 2023-07-27 NOTE — CONSULTS
Pt admitted with PMH of HTN, hyperlipidemia, inflammatory monoarthritis, chronic lymphocytic leukemia, abdominal aortic aneurysm, anemia, chronic kidney disease, atrial fibrillation, diabetes, CAD, thrombocytopenia, idiopathic pericarditis who presents to ED from primary care's office for evaluation of abnormal labs and pain in multiple joints, noted to have unplanned readmission risk of 25%.  Care Coordination team is following and will assess for discharge needs when medically appropriate.        Update: ROSEANN completed. Pt and spouse state they don't have any resources/supports at home.     Miriam Torres, MILLI, [facility] Health CareSW, [facility]  Inpatient Care Coordination  [facility]  [phone]

## 2023-07-27 NOTE — PLAN OF CARE
Goal Outcome Evaluation:         A&Ox4; pleasant. VSS. A1 with gait belt. RA. Denies CP or SOB. 5/10 pain in joints. Started prednisone today. BG ACHS. Clear liquid diet ordered. NPO at midnight for colonostomy aubrie. Echo done today. Fluids infusing at 75mL/hr.  Starting bowel prep at 1500. GI following. Pain and SW consulted. PRN Dilaudid and Atarax ordered for pain, Tylenol dose increased. Hgb Stable this am. 1 mod. Bloody stool this am. Wife at bedside. Good urine output. Will continue to follow POC.

## 2023-07-27 NOTE — PROVIDER NOTIFICATION
Notified MD: Hgb 6.9 was notified after 1 unit of blood transfusion earlier today. Do you want to do another unit of blood transfusion? Thank you.

## 2023-07-27 NOTE — PLAN OF CARE
"Care from [id number]    Inpatient Progress Note:  For complete assessment see flow sheet documentation.    BP (!) 153/79 (BP Location: Left arm)   Pulse 99   Temp 98  F (36.7  C) (Oral)   Resp 18   Ht 1.753 m (5' 9\")   Wt 89.9 kg (198 lb 3.2 oz)   SpO2 99%   BMI 29.27 kg/m         Orientation: A&OX4  Pain status: 5-6/10 joint pain on all 4 extremities  Activity: Ax1 with walker and gait belt  Peripheral edema: Mild edema on BUE down to fingers and both knees  Resp: WNL  Cardiac: on telemetry, SR @ 90's  GI: Positive occult blood in stool, BS active, abdomen soft, round and non tender  : WNL, Urinal provided @ HS  LDA: PIV  Infusions: NS @ 100ml/hr, pt received 1 unit of PRBC d/t Hgb 6.9 at 2200. No reaction noted, Pt tolerated well.   Pertinent Labs: Hgb 6.9  Diet: Full liquid diet, NPO since [place]  Consults: GI  Discharge Plan: TBD     Will continue to monitor and provide cares.     Daniela Lindquist RN  "

## 2023-07-27 NOTE — PROGRESS NOTES
[facility]    Medicine Progress Note - Hospitalist Service    Date of Admission:  7/26/2023    Assessment & Plan   Austin Garza is a 82 year old male with PMH of HTN, hyperlipidemia, inflammatory monoarthritis, chronic lymphocytic leukemia, abdominal aortic aneurysm, anemia, chronic kidney disease, atrial fibrillation, diabetes, CAD, thrombocytopenia, idiopathic pericarditis who presents to ED from primary care's office for evaluation of abnormal labs and pain in multiple joints.    Acute blood loss anemia on chronic anemia.  Gastrointestinal hemorrhage/hematochezia.  -Suspect lower GI bleed.  -Appreciate gastroenterology input.  -Planning for colonoscopy tomorrow.  -Start colonoscopy prep.  -Continue IV pantoprazole 40 mg twice a day.  -Allow clear liquid diet today.  -N.p.o. at midnight.  -Monitor serial hemoglobin.  -Transfuse as needed.  -Continue to hold clopidogrel.    Coronary artery disease.    Mild troponin elevation.  Hypertension.  Hyperlipidemia.  Paroxysmal atrial fibrillation.  -Suspect type II myocardial infarction due to demand ischemia from anemia.  -Repeat troponin level decreasing.  -No chest pain.  -Monitor on telemetry.  -Check echocardiogram.  -Hold clopidogrel as above.  -Continue metoprolol succinate 100 mg a day.  -Continue rosuvastatin 20 mg a day.    Diabetes mellitus type 2.  -Continue to hold dulaglutide.  -Start glargine insulin 7 units a day.  -Continue aspart insulin sliding scale as needed.    Polyarticular arthritis.  -Rheumatoid factor significantly elevated at 92.  -Should follow-up with rheumatology in the outpatient setting.  -No obvious infection.  -For now, start prednisone 20 mg a day.  -Pain medications as needed.  -Pain management team consult.    Chronic kidney disease stage IIIb.  Hyponatremia.  -Creatinine appears to be at baseline.  -Avoid nephrotoxins as able.  -Sodium mildly low at 131.  -Start gentle IV fluids with plan for n.p.o. at  "midnight.  -Recheck metabolic panel tomorrow.    CLL.  Chronic thrombocytopenia.  -Platelets at baseline.  -Recheck CBC tomorrow.     Diet: Clear Liquid Diet    DVT Prophylaxis: Pneumatic Compression Devices  Siddiqui Catheter: Not present  Lines: None     Cardiac Monitoring: ACTIVE order. Indication: Tachyarrhythmias, acute (48 hours)  Code Status: Full Code      Clinically Significant Risk Factors Present on Admission          # Hypocalcemia: Lowest Ca = 8.1 mg/dL in last 2 days, will monitor and replace as appropriate     # Hypoalbuminemia: Lowest albumin = 3.2 g/dL at 7/26/2023 12:31 PM, will monitor as appropriate  # Coagulation Defect: INR = 1.22 (Ref range: 0.85 - 1.15) and/or PTT = N/A, will monitor for bleeding  # Drug Induced Platelet Defect: home medication list includes an antiplatelet medication   # Hypertension: Noted on problem list     # DMII: A1C = 7.1 % (Ref range: 0.0 - 5.6 %) within past 6 months    # Overweight: Estimated body mass index is 28.96 kg/m  as calculated from the following:    Height as of this encounter: 1.753 m (5' 9\").    Weight as of this encounter: 89 kg (196 lb 1.6 oz).            Disposition Plan      Expected Discharge Date: 07/29/2023                  Augusto Mejia DO  Hospitalist Service  [facility]  Securely message with [employer] (more info)  Text page via [facility] Paging/Directory   ______________________________________________________________________    Interval History   Continues to have blood in his stools.  Denies chest pain, shortness of breath, fevers, chills, nausea, or vomiting.    Physical Exam   Vital Signs: Temp: 98  F (36.7  C) Temp src: Oral BP: (!) 142/84 Pulse: 98   Resp: 18 SpO2: 98 % O2 Device: None (Room air)    Weight: 196 lbs 1.6 oz    Gen:  NAD, A&Ox3.  Eyes:  PERRL, sclera anicteric.  OP:  MMM, no lesions.  Neck:  Supple.  CV: Mildly irregular, no loud murmurs.  Lung:  CTA b/l, normal effort.  Ab:  +BS, soft.  Skin:  Warm, dry " to touch.  No rash.  Ext:  No pitting edema LE b/l.      Medical Decision Making       40 MINUTES SPENT BY ME on the date of service doing chart review, history, exam, documentation & further activities per the note.      Data     I have personally reviewed the following data over the past 24 hrs:    7.5  \   8.1 (L)   / 70 (L)     131 (L) 100 17.8 /  249 (H)   3.8 21 (L) 1.56 (H) \     ALT: N/A AST: N/A AP: N/A TBILI: N/A   ALB: N/A TOT PROTEIN: N/A LIPASE: N/A     Trop: N/A BNP: N/A     Procal: N/A CRP: N/A Lactic Acid: N/A       INR:  1.22 (H) PTT:  N/A   D-dimer:  N/A Fibrinogen:  N/A     Ferritin:  79 % Retic:  N/A LDH:  158       Imaging results reviewed over the past 24 hrs:   No results found for this or any previous visit (from the past 24 hour(s)).

## 2023-07-27 NOTE — CONSULTS
GASTROENTEROLOGY CONSULTATION      Austin Garza  [address] [place] [address]  83 year old male     Admission Date/Time: 7/26/2023  Primary Care Provider: Luma Kauffman     We were asked to see the patient in consultation by Dr. Nielsen for evaluation of anemia.    CC: anemia, hematochezia      HPI:  Austin Garza is a 83 year old male with history of hypertension, hyperlipidemia, inflammatory monoarthritis, chronic lymphocytic leukemia, AAA, chronic kidney disease, atrial fibrillation, coronary artery disease who presents for evaluation of acute on chronic anemia and hematochezia.  The patient had actually been undergoing evaluation for generalized joint pain in the outpatient setting but was advised to come to the ED when his hemoglobin was found to be 7.7 (typical baseline 9).  Work-up in the ED showed a hemoglobin of 7.3 he received 1 unit PRBCs but recheck was lower at 6.9.  Received an additional 1 unit PRBCs and hemoglobin this morning is 8.1.  He did have a positive fecal occult.    Prior to admission the patient was unaware of any overt bleeding including hematochezia or melena.  However, he has had a couple of bloody stools overnight with bright red blood noted.  He does state that there was 1 occasion several months ago where he noticed red blood in the toilet bowl but this resolved spontaneously.  Denies any change to his bowel habits.  He typically has a bowel movement most days.  Denies other symptoms such as abdominal pain, nausea or vomiting, unintended weight loss.    Colonoscopy was completed 2/8/2022 that showed a large 26 to 30 mm polyp in the transverse colon which was tattooed but not removed.  He also had one 6 mm polyp and noted diverticulosis and internal hemorrhoids.  Colonoscopy for removal of large polyp was scheduled for March but was not complete due to inadequate prep.  Following this his cardiac issues took priority and it was recommended he not have a  colonoscopy for 6 months following stent placement November 2022.  He is currently maintained on Plavix.    Denies history of GERD.  He is a non-smoker.  He has never had an upper endoscopy before.  Does not typically use NSAIDs.       PAST MEDICAL HISTORY:  Patient Active Problem List    Diagnosis Date Noted    Shortness of breath 07/26/2023     Priority: Medium    Multiple joint pain 07/26/2023     Priority: Medium    Anemia due to blood loss, acute 07/26/2023     Priority: Medium    Gastrointestinal hemorrhage with melena 07/26/2023     Priority: Medium    Cardiomyopathy, unspecified type (H) 05/30/2023     Priority: Medium    Thrombocytopenia (H) 02/16/2023     Priority: Medium    Facial hematoma 02/16/2023     Priority: Medium    Early dry stage nonexudative age-related macular degeneration of both eyes 01/31/2023     Priority: Medium    Posterior subcapsular age-related cataract of right eye 01/31/2023     Priority: Medium    History of colonic polyps 11/28/2022     Priority: Medium     Has large polyp but needs to be off plavix for polypectomy  Needs to be on plavix until at least May 2023 due to coronary stent    [facility]        Atherosclerotic heart disease of native coronary artery without angina pectoris 07/25/2022     Priority: Medium    Status post coronary angiogram 06/29/2022     Priority: Medium    Type 2 diabetes mellitus with other specified complication, with long-term current use of insulin (H) 02/15/2022     Priority: Medium    Sensorineural hearing loss, bilateral 10/06/2021     Priority: Medium    Ureterolithiasis 09/27/2021     Priority: Medium     Formatting of this note might be different from the original.  Added automatically from request for surgery [id number]      Paroxysmal atrial fibrillation (H) 07/30/2021     Priority: Medium    Artery dissection (H) 02/18/2021     Priority: Medium    Bilateral incipient cataracts 02/04/2021     Priority: Medium    Stage 3a chronic  "kidney disease (H) 06/03/2020     Priority: Medium    Elevated prostate specific antigen (PSA) 12/18/2019     Priority: Medium    Anemia 06/13/2019     Priority: Medium    Abdominal aortic aneurysm (AAA) (H) 11/29/2017     Priority: Medium    Chronic lymphocytic leukemia (CLL), B-cell (H) 11/17/2017     Priority: Medium    BCC (basal cell carcinoma), face 10/23/2015     Priority: Medium     Excised 10/22/15. R temporal area.      Skin lesion of back 10/23/2015     Priority: Medium     Pre-cancerous. 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     Priority: Medium     Unrevealing arthrocentesis except for white cells      Hyperlipidemia 02/10/2010     Priority: Medium    Hypertension goal BP (blood pressure) < 140/90 12/02/2004     Priority: Medium          ROS: A comprehensive ten point review of systems was negative aside from those in mentioned in the HPI.       MEDICATIONS:   Prior to Admission medications    Medication Sig Start Date End Date Taking? Authorizing Provider   clopidogrel (PLAVIX) 75 MG tablet Take 1 tablet (75 mg) by mouth daily 5/9/23  Yes Luma Kauffman MD   Dulaglutide 4.5 MG/0.5ML SOPN Inject 4.5 mg Subcutaneous once a week 5/30/23  Yes Luma Kauffman MD   metoprolol succinate ER (TOPROL XL) 100 MG 24 hr tablet Take 1 tablet (100 mg) by mouth daily 6/19/23  Yes Luma Kauffman MD   nitroGLYcerin (NITROSTAT) 0.4 MG sublingual tablet One tablet under the tongue every 5 minutes if needed for chest pain. May repeat every 5 minutes for a maximum of 3 doses in 15 minutes\" 6/30/22  Yes Evaristo Beaulieu MD   pantoprazole (PROTONIX) 40 MG EC tablet Take 1 tablet (40 mg) by mouth daily 6/8/23  Yes Luma Kauffman MD   rosuvastatin (CRESTOR) 20 MG tablet Take 1 tablet (20 mg) by mouth daily 8/3/22  Yes Vanessa Munson MD        ALLERGIES: " "  Allergies   Allergen Reactions    Ace Inhibitors Cough     cough  cough        SOCIAL HISTORY:  Social History     Tobacco Use    Smoking status: Former     Packs/day: 0.50     Years: 20.00     Pack years: 10.00     Types: Cigarettes     Quit date: 1975     Years since quittin.6    Smokeless tobacco: Former   Substance Use Topics    Alcohol use: Yes     Alcohol/week: 10.0 - 14.0 standard drinks of alcohol     Types: 10 - 14 Standard drinks or equivalent per week     Comment: 3 drinks a day/wine    Drug use: No        FAMILY HISTORY:  Family History   Problem Relation Age of Onset    Cerebrovascular Disease Father         x 2    Hypertension Mother     C.A.D. Mother     Cancer Mother         skin    Eye Disorder Mother         glaucoma    Prostate Cancer No family hx of     Cancer - colorectal No family hx of     Glaucoma No family hx of     Macular Degeneration No family hx of         PHYSICAL EXAM:   BP (!) 142/84   Pulse 98   Temp 98  F (36.7  C) (Oral)   Resp 18   Ht 1.753 m (5' 9\")   Wt 89 kg (196 lb 1.6 oz)   SpO2 98%   BMI 28.96 kg/m       PHYSICAL EXAM:  General: alert, oriented, NAD  SKIN: no suspicious lesions, rashes, jaundice, or spider angiomas  HEAD: Normocephalic. No masses, lesions, tenderness or abnormalities  NECK: Neck supple. No adenopathy. Thyroid symmetric, normal size.  EYES: No scleral icterus  ENT: ENT exam normal, no neck nodes or sinus tenderness  RESPIRATORY: negative, Good diaphragmatic excursion. Lungs clear  CARDIOVASCULAR: negative, PMI normal. No lifts, heaves, or thrills. RRR. No murmurs, clicks gallops or rub  GASTROINTESTINAL: +BS, soft, NT, ND, no HSM, no masses/guarding/rebound  JOINT/EXTREMITIES: extremities normal- no gross deformities noted, gait normal and normal muscle tone  NEURO: Reflexes grossly normal and symmetric. Sensation grossly WNL.  PSYCH: no abnormal anxiety/depression  LYMPH: No anterior cervical, posterior cervical, or supraclavicular " adenopathy     LABS:  I reviewed the patient's new clinical lab test results.   Recent Labs   Lab Test 07/27/23  0650 07/26/23  2146 07/26/23  1317 07/26/23  1231 07/07/23  1015 09/13/22  0917 06/29/22  1008 08/10/21  0936 08/02/21  0657   WBC 7.5  --   --  5.7 7.6   < > 6.4   < > 8.0   HGB 8.1* 6.9*  --  7.3* 9.1*   < > 12.6*   < > 12.5*   MCV 84  --   --  85 88   < > 91   < > 98   PLT 70*  --   --  72* 79*   < > 66*   < > 120*   INR  --   --  1.22*  --   --   --  1.03  --  1.24*    < > = values in this interval not displayed.     Recent Labs   Lab Test 07/27/23  0650 07/26/23  1231 07/07/23  1015   * 132* 140   POTASSIUM 3.8 4.1 5.1   CHLORIDE 100 99 105   CO2 21* 22 24   BUN 17.8 21.8 17.6   ANIONGAP 10 11 11   MAGDALENO 8.1* 8.8 9.1     Recent Labs   Lab Test 07/26/23  1231 07/26/23  1036 05/15/23  0923 02/14/23  0925 11/08/21  0927 09/26/21  1837 09/26/21  1813 09/26/21  1459   ALBUMIN 3.2*  --  3.9 4.1   < >  --    < >  --    BILITOTAL 0.5  --  0.5 0.5   < >  --    < >  --    ALT 22  --  26 25   < >  --    < >  --    AST 21  --  24 22   < >  --    < >  --    ALKPHOS 103  --  65 64   < >  --    < >  --    PROTEIN  --  100*  --   --   --  100*  --  100*    < > = values in this interval not displayed.     IMAGING  I personally reviewed the patient's new imaging results.     CONSULTATION ASSESSMENT AND PLAN:    ACUTE ON CHRONIC ANEMIA  HEMATOCHEZIA    This patient is an 83 year old male with history of hypertension, hyperlipidemia, inflammatory monoarthritis, chronic lymphocytic leukemia, AAA, chronic kidney disease, atrial fibrillation, coronary artery disease who presents was sent to the ED for evaluation after blood work showed hemoglobin of 7.7, lower than baseline of 9.  The patient has also had bloody stool since admission with a drop in hemoglobin to 6.9.  He has received 2 units PRBCs and t hemoglobin is 8.1 this morning.  History is significant for colonoscopy February 2022 that showed a large polyp of 26  "to 30 mm in the transverse colon which requires EMR with an advanced endoscopist.  Procedure was scheduled with aborted due to inadequate prep and never rescheduled.  We will plan on a colonoscopy tomorrow afternoon to evaluate for source of bleeding which may be related to large polyps, diverticulosis, internal hemorrhoids, malignancy possible.  The patient is aware that we will not be performing the resection of the large polyp tomorrow and that this will need to be scheduled on an outpatient basis.    Plan  --Clear liquids today  --Start Golytely bowel prep this afternoon. As he will require \"double prep\" will do additional prep early am 3-4 am. Will need to be completely NPO after 6am for 2pm procedure.  --Colonoscopy tomorrow in OR with MAC.   --Hold Plavix.   --PPI BID for now  --Further recommendations following procedure.     Discussed with Dr. Castro, GI staff physician.     Total time spent:  Approximately, 45 minutes was spent providing patient care, including patient evaluation, reviewing documentation/test results, and . Thank you for asking us to participate in the care of this patient.      Holley Salcido, [facility] ([facility])  [phone]        "

## 2023-07-27 NOTE — H&P (VIEW-ONLY)
[facility] ACUTE PAIN SERVICE CONSULTATION   [facility]   Text Page Pain Via [employer]    Date of Admission:  7/26/2023  Date of Consult (When I saw the patient): 07/27/23  Physician requesting consult: Augusto Perdomo DO   Service:Hospitalist  Reason for consult:  acute pain management      Assessment/Plan:     Austin Garza is a 83 year old male who was admitted on 7/26/2023.    I was asked to see the patient for acute pain management with pain type musculoskeletal related to inflammatory process. History of pain began July 22 nd with c/o multi joint and and hand pain. He was sent to ED by his PCP for also abnormal labs including elevated CRP, +RF, anemia.  His pain continued to increase in severity and involving more joints and he went to urgent care with [facility], given recommendations for over the counter diclofenac and follow up with PCP.  Describes pain as sharp, aching and tender rating 3-5/10.. The patient denies shortness of breath, chest pain, changes in mental status. The patient does not smoke and no chemical dependency history. Renal function is reduced Opioid tolerance is naive.     Opioid Induced Respiratory Depression Risk Assessment:  (Low 0-1; Moderate 2-4; or High >4 or >/= 3 if two of the risk factors are age > 60 and opioid naive) due to the following risk factors: MAGDALENA, COPD/Asthma/pulmonary disease, CHF, renal dysfunction, hepatic dysfunction, Obesity, Smoker, Age>60, >2 opioid therapies, concomitant CNS depressants, opioid naive status, or post surgical ?   Chronic opioid use = 0 mg MME, opioid used this past 24 hours 0.     PLAN:     1) Pain us consistent with musculoskeletal related to inflammatory process, secondary to likely new onset of rheumatoid arthritis. Treatment plan includes multimodal pain approach, medications as listed below, reviewed.  Discharge medications, advised keeping track of doses, educated on tapering off as pain improves, watch for  "constipation and stool softeners, no driving or alcohol with opioids, store medications in a safe place, advised to take no more than the prescribed dose as increased doses may cause respiratory depression or death. Patient is understanding of the plan. All questions and concerns addressed to patient's satisfaction.   2)Multimodal Medication Therapy  Topical: Diclofenac gel with Lidocaine 5 % ointment Apply to  tender areas QID.   Adjuvants: CrCl is 43.1 mg/dl and Tylenol 975 mg every 8 hours\", Hydroxyzine 10 mg every 6 hours hold for sedation\" Gabapentin 100 mg 3 times daily, Muscle relaxer's not indicated. \"  Antidepressants/anxiolytics: Not indicated  Opioids:Hydromorphone(Dilaudid) 2 mg every 4 hours as needed pain  IV Pain medication: Dilaudid 0.2 to 0.4 mg every 2 hours as needed pain and Try to minimize & give po first for tonight when n.p.o. please use  3)Non-medication interventions- Ice, Heat, physical therapy, distraction aroma therapy  4)Constipation Prophylaxis- senna and miralax . Continue to monitor.   5) Follow up   -acute pain team will continue to follow   -Opioid prescriber has been none. PCP is Luma Kauffman.    -Discharge Recommendations - We recommend prescribing the following at the time of discharge:   Continue Prednisone   Dilaudid 2 mg 1/2 tablet(s) every 4 hrs prn #10  Diclofenac gel ( has at home) lidocaine ointment qid prn    Hydroxyzine 10 mg every 6 hours as needed  Follow-up should be considered with rheumatology.  Disposition: Home    Prescribing at discharge: Hospitalist       History of Present Illness (HPI):       Austin Garza is a 83 year old male who presents to the ED on the advice of his primary care provider with abnormal labs and a 6-day history of increasing joint pain.  The patient reports pain that is in his joints mainly wrists, hands, hips, and feet and does not radiate.  Current pain is rated at 4/10 and goal is 4/10. 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  Abnormal lab findings are consistent with low hemoglobin, elevated C-reactive protein elevated rheumatoid factor, thrombocytopenia.  The patient does not have a history of chronic pain . Per [place]  [place] noting no controlled substances. The patient has a naïve opioid tolerance. Opioid induced side effects including sedation, respiratory suppression, nausea, and constipation are not noted. The patient does not history of MAGDALENA or history of substance use disorder.     Discussed multimodal interventions, interventions other than systemic pharmacologic treatments     Review of medical record/Summary of labs in epic.      Past pain treatments have included none    Last UDS none       [place]ed from [place] on 07/27/23.  No controlled substances in the last 12 months.     Medical History   has a past medical history of AAA (abdominal aortic aneurysm), BCC (basal cell carcinoma of skin) - face, CLL (chronic lymphocytic leukemia), Diaphragmatic hernia, Diverticulitis of colon, Esophageal reflux, Essential hypertension, Hyperlipidemia, Irritable bowel syndrome, Macular degeneration (senile) of retina, Mumps, Squamous Cell Carcinoma, Back (1988), and Type 2 diabetes mellitus.    He has no past medical history of Asymptomatic human immunodeficiency virus (HIV) infection status (H), Blood in semen, Cerebral palsy (H), Complication of anesthesia, Congenital renal agenesis and dysgenesis, Epididymitis, bilateral, Epididymitis, left, Epididymitis, right, Goiter, Gout, Hernia, abdominal, History of spinal cord injury, History of thrombophlebitis, Horseshoe kidney, Hydrocephalus (H), Malignant hyperthermia, Orchitis, epididymitis, and epididymo-orchitis, with abscess, Palpitations, Parkinsons disease (H), Penile discharge, Prostate infection, Spider  "veins, Spina bifida (H), STD (sexually transmitted disease), Swelling of testicle, Tethered cord (H), or Tuberculosis.       Surgical History   has a past surgical history that includes colonoscopy (2002); Bone marrow biopsy, bone specimen, needle/trocar (N/A, 11/21/2017); Endovascular repair aneurysm abdominal aorta (N/A, 12/4/2017); IR Abdominal Aortogram (3/11/2019); IR Visceral Embolization (5/13/2019); Squamous cell skin cancer resection - back (1985-90); Multiple skin tags - resection (1998); Fistulotomy with marsupialization for repair of fisture in ano (2007); Appendectomy open (1966); Anesthesia cardioversion (N/A, 8/2/2021); Vasectomy; and Coronary Angiogram (N/A, 6/29/2022).     Allergies     Allergies   Allergen Reactions    Ace Inhibitors Cough     cough  cough        Current Home Medications   Prior to Admission medications    Medication Sig Start Date End Date Taking? Authorizing Provider   clopidogrel (PLAVIX) 75 MG tablet Take 1 tablet (75 mg) by mouth daily 5/9/23  Yes Luma Kauffman MD   Dulaglutide 4.5 MG/0.5ML SOPN Inject 4.5 mg Subcutaneous once a week 5/30/23  Yes Luma Kauffman MD   metoprolol succinate ER (TOPROL XL) 100 MG 24 hr tablet Take 1 tablet (100 mg) by mouth daily 6/19/23  Yes Luma Kauffman MD   nitroGLYcerin (NITROSTAT) 0.4 MG sublingual tablet One tablet under the tongue every 5 minutes if needed for chest pain. May repeat every 5 minutes for a maximum of 3 doses in 15 minutes\" 6/30/22  Yes Evaristo Beaulieu MD   pantoprazole (PROTONIX) 40 MG EC tablet Take 1 tablet (40 mg) by mouth daily 6/8/23  Yes Luma Kauffman MD   rosuvastatin (CRESTOR) 20 MG tablet Take 1 tablet (20 mg) by mouth daily 8/3/22  Yes Vanessa Munson MD          Social History  Reviewed, and he  reports that he quit smoking about 48 years ago. His smoking use included cigarettes. He has a 10.00 pack-year smoking history. He has quit using smokeless tobacco. He reports current " alcohol use of about 10.0 - 14.0 standard drinks of alcohol per week. He reports that he does not use drugs.      Family History- Reviewed care everywhere to find family history nothing relevant to pain consult    Reviewed, and family history includes C.A.D. in his mother; Cancer in his mother; Cerebrovascular Disease in his father; Eye Disorder in his mother; Hypertension in his mother.    Review of Systems  Complete ROS reviewed, unless noted  , all other systems reviewed (with patient) and all others found to be negative.         Objective:     Vitals:  B/P: 142/84, T: 98, P: 98, R: 18     Weight:   196 lbs 1.6 oz  Body mass index is 28.96 kg/m .      Physical Exam:     General Appearance:  Alert, cooperative, no distress, appears stated age patient is pleasant  Patient is able to make needs known   Head:  Normocephalic, without obvious abnormality, atraumatic   Eyes:  Pupils are EOMI's   ENT/Throat: Lips and mouth are moist   Lymph/Neck: Supple, symmetrical, trachea midline, no adenopathy, thyroid: not enlarged, symmetric    Lungs:   Clear to auscultation bilaterally, respirations unlabored   Chest Wall:  No tenderness or deformity   Cardiovascular/Heart:  Regular rate and rhythm, S1, S2, hypertensive no edema   Abdomen:   Soft, non-tender, bowel sounds active all four quadrants,  no masses, no organomegaly   Musculoskeletal: Extremities normal, atraumatic, point tenderness to palpation in the palm of the hands, feet metatarsals and metacarpals, both wrists.  Incision none   Skin: Skin color good, lesions none   Neurologic: Alert and oriented X 3, Moves all 4 extremities, symmetric strength x4          Imaging Reviewed Personally By Myself     No results found for this visit on 07/26/23.     Labs Reviewed Personally By Myself    Sodium   Date Value Ref Range Status   07/27/2023 131 (L) 136 - 145 mmol/L Final   06/01/2021 136 133 - 144 mmol/L Final     Potassium   Date Value Ref Range Status   07/27/2023 3.8 3.4  - 5.3 mmol/L Final   08/02/2022 3.9 3.4 - 5.3 mmol/L Final   06/01/2021 4.2 3.4 - 5.3 mmol/L Final     Chloride   Date Value Ref Range Status   07/27/2023 100 98 - 107 mmol/L Final   08/02/2022 103 94 - 109 mmol/L Final   06/01/2021 104 94 - 109 mmol/L Final     Carbon Dioxide   Date Value Ref Range Status   06/01/2021 30 20 - 32 mmol/L Final     Carbon Dioxide (CO2)   Date Value Ref Range Status   07/27/2023 21 (L) 22 - 29 mmol/L Final   08/02/2022 24 20 - 32 mmol/L Final     Anion Gap   Date Value Ref Range Status   07/27/2023 10 7 - 15 mmol/L Final   08/02/2022 10 3 - 14 mmol/L Final   06/01/2021 2 (L) 3 - 14 mmol/L Final     Glucose   Date Value Ref Range Status   08/02/2022 249 (H) 70 - 99 mg/dL Final   06/01/2021 215 (H) 70 - 99 mg/dL Final     GLUCOSE BY METER POCT   Date Value Ref Range Status   07/27/2023 220 (H) 70 - 99 mg/dL Final     Urea Nitrogen   Date Value Ref Range Status   07/27/2023 17.8 8.0 - 23.0 mg/dL Final   08/02/2022 20 7 - 30 mg/dL Final   06/01/2021 26 7 - 30 mg/dL Final     Creatinine   Date Value Ref Range Status   07/27/2023 1.56 (H) 0.67 - 1.17 mg/dL Final   06/01/2021 1.51 (H) 0.66 - 1.25 mg/dL Final     GFR Estimate   Date Value Ref Range Status   07/27/2023 44 (L) >60 mL/min/1.73m2 Final   06/01/2021 43 (L) >60 mL/min/[1.73_m2] Final     Comment:     Non  GFR Calc  Starting 12/18/2018, serum creatinine based estimated GFR (eGFR) will be   calculated using the Chronic Kidney Disease Epidemiology Collaboration   (CKD-EPI) equation.       Calcium   Date Value Ref Range Status   07/27/2023 8.1 (L) 8.8 - 10.2 mg/dL Final   06/01/2021 8.9 8.5 - 10.1 mg/dL Final            Please see A&P for additional details of medical decision making.  MANAGEMENT DISCUSSED with the following over the past 24 hours: Juan Luis Jackson RN, Ritu Mejia,     NOTE(S)/MEDICAL RECORDS REVIEWED over the past 24 hours: yes   Tests personally interpreted in the past 24 hours:  - EKG tracing  showing SR PAC normal QTC, echo normal  - ABDOMINAL XRAY showing no interval changes   Tests ORDERED & REVIEWED in the past 24 hours:  - See lab/imaging results included in the data section of the note  - CMP  - CBC  SUPPLEMENTAL HISTORY, in addition to the patient's history, over the past 24 hours obtained from:   - Spouse or significant other  Medical complexity over the past 24 hours:  - Parenteral (IV) CONTROLLED SUBSTANCES ordered  - Decision regarding ESCALATION OF LEVEL OF CARE  - Prescription DRUG MANAGEMENT performed  65 5 MINUTES SPENT BY ME on the date of service doing chart review, history, exam, documentation & further activities per the note.    Thank you for this consultation.      Maria Isabel Randall RN, PGMT-BC APRN,CNP,ACHPN   Acute Pain Team ( SD/[facility]) 8-4:30 after 3:30 page house officer   No weekend coverage   [facility] Paging/Directory Pain  Securely message with the Vocera Web Console (learn more here)

## 2023-07-27 NOTE — UTILIZATION REVIEW
"   Admission Status; Secondary Review Determination         Under the authority of the Utilization Management Committee, the utilization review process indicated a secondary review on the above patient.  The review outcome is based on review of the medical records, discussions with staff, and applying clinical experience noted on the date of the review.          (x) Observation Status Appropriate - This patient does not meet hospital inpatient criteria and is placed in observation status. If this patient's primary payer is Medicare and was admitted as an inpatient, Condition Code 44 should be used and patient status changed to \"observation\".     RATIONALE FOR DETERMINATION   82-year-old male with a complex medical history, including hypertension, hyperlipidemia, inflammatory monoarthritis, chronic lymphocytic leukemia (CLL), abdominal aortic aneurysm (AAA), anemia, chronic kidney disease (CKD), atrial fibrillation (AFib), diabetes, coronary artery disease (CAD), and thrombocytopenia, presented to the ED with abnormal labs and joint pain. The patient was diagnosed with acute-on-chronic iron deficiency anemia and suspected GI bleeding due to chronic blood loss, leading to a packed red blood cell transfusion. GI consultation was requested, and the patient was kept NPO (nothing by mouth) after midnight. Plavix was held, and hemoglobin posttransfusion was to be monitored. Thrombocytopenia was noted in the presence of stable CLL without signs of bleeding. Further investigations and follow-ups were planned to address the various medical conditions.  The severity of illness, intensity of service provided, expected LOS and risk for adverse outcome make the care appropriate for further observation; however, doesn't meet criteria for hospital inpatient admission. Dr Mejia  notified of this determination.    This document was produced using voice recognition software.      The information on this document is developed by the " utilization review team in order for the business office to ensure compliance.  This only denotes the appropriateness of proper admission status and does not reflect the quality of care rendered.         The definitions of Inpatient Status and Observation Status used in making the determination above are those provided in the CMS Coverage Manual, Chapter 1 and Chapter 6, section 70.4.      Sincerely,     BENJAMIN GANDHI MD    System Medical Director  Utilization Management  [facility].

## 2023-07-28 NOTE — PROVIDER NOTIFICATION
RN on 3rd floor called to verify if patient should continue to drink GoLytly even though order for NPO after 0600.  Per RN, has not taken much of volume of the 2nd dose ordered due to NPO order.  Spoke with Dr. Alexander, ok to continue drinking prep and complete as soon as possible, ideally by 0900.

## 2023-07-28 NOTE — ANESTHESIA PREPROCEDURE EVALUATION
Anesthesia Pre-Procedure Evaluation    Patient: Austin Garza   MRN: [id number] : 1940        Procedure : Procedure(s):  COLONOSCOPY          Past Medical History:   Diagnosis Date     AAA (abdominal aortic aneurysm)      BCC (basal cell carcinoma of skin) - face      CLL (chronic lymphocytic leukemia)      Diaphragmatic hernia      Diverticulitis of colon      Esophageal reflux      Essential hypertension      Hyperlipidemia      Irritable bowel syndrome      Macular degeneration (senile) of retina      Mumps      Squamous Cell Carcinoma, Back      Type 2 diabetes mellitus       Past Surgical History:   Procedure Laterality Date     ANESTHESIA CARDIOVERSION N/A 2021    Procedure: ANESTHESIA, FOR CARDIOVERSION;  Surgeon: GENERIC ANESTHESIA PROVIDER;  Location: [facility] OR     APPENDECTOMY OPEN  1966     BONE MARROW BIOPSY, BONE SPECIMEN, NEEDLE/TROCAR N/A 2017    Procedure: BIOPSY BONE MARROW;  BONE MARROW BIOPSY;  Surgeon: Shady Garcia MD;  Location:  GI     COLONOSCOPY       CV CORONARY ANGIOGRAM N/A 2022    Procedure: Coronary Angiogram;  Surgeon: Evaristo Beaulieu MD;  Location:  [facility]     ENDOVASCULAR REPAIR ANEURYSM ABDOMINAL AORTA N/A 2017    Procedure: ENDOVASCULAR REPAIR ANEURYSM ABDOMINAL AORTA;  ENDOVASCULAR REPAIR ANEURYSM ABDOMINAL AORTA;  Surgeon: Milton Cazares MD;  Location:  OR     Fistulotomy with marsupialization for repair of fisture in ano       IR ABDOMINAL AORTOGRAM  3/11/2019     IR VISCERAL EMBOLIZATION  2019     Multiple skin tags - resection  1998     Squamous cell skin cancer resection - back       VASECTOMY        Allergies   Allergen Reactions     Ace Inhibitors Cough     cough  cough      Social History     Tobacco Use     Smoking status: Former     Packs/day: 0.50     Years: 20.00     Pack years: 10.00     Types: Cigarettes     Quit date: 1975     Years since quittin.6     Smokeless  tobacco: Former   Substance Use Topics     Alcohol use: Yes     Alcohol/week: 10.0 - 14.0 standard drinks of alcohol     Types: 10 - 14 Standard drinks or equivalent per week     Comment: 3 drinks a day/wine      Wt Readings from Last 1 Encounters:   07/28/23 89.2 kg (196 lb 11.2 oz)        Anesthesia Evaluation            ROS/MED HX  ENT/Pulmonary:  - neg pulmonary ROS     Neurologic:  - neg neurologic ROS     Cardiovascular: Comment: Study Date: 07/27/2023 12:59 PM  Age: 83 yrs  Gender: Male  Patient [facility]: [facility]  Reason For Study: CAD  Ordering Physician: SHANIA MORRISON  Referring Physician: Arlen Kauffman  Performed By: Jey Koo RDCS     BSA: 2.1 m2  Height: 69 in  Weight: 198 lb  HR: 84  BP: 142/84 mmHg  ______________________________________________________________________________  Procedure  Complete Portable Echo Adult.  ______________________________________________________________________________  Interpretation Summary     The left ventricle is normal in size.  Left ventricular systolic function is normal.  The visual ejection fraction is 55-60%.  The right ventricle is normal size.  The right ventricular systolic function is normal.  Normal left atrial size.  The ascending aorta is Moderately dilated.  The inferior vena cava was normal in size with preserved respiratory  variability.      (+) Dyslipidemia hypertension- Peripheral Vascular Disease (AAA s/p endo repair)-  CAD -  - -                        dysrhythmias (PAF), a-fib,          (-) CHF   METS/Exercise Tolerance:     Hematologic:     (+)      anemia,          Musculoskeletal:   (+)  arthritis,             GI/Hepatic:     (+) GERD,                   Renal/Genitourinary:     (+) renal disease (Stage 3),             Endo:     (+)  type II DM,             Obesity,       Psychiatric/Substance Use:       Infectious Disease:       Malignancy:   (+) Malignancy, History of Lymphoma/Leukemia.Lymph CA Active status post.      Other:             Physical Exam    Airway        Mallampati: II   TM distance: > 3 FB   Neck ROM: full   Mouth opening: > 3 cm    Respiratory Devices and Support         Dental           Cardiovascular   cardiovascular exam normal          Pulmonary   pulmonary exam normal            Other findings: Lab Test        07/28/23     07/27/23     07/27/23     07/27/23     07/26/23     07/26/23     07/26/23     09/13/22     06/29/22     08/10/21     08/02/21                       0653          2257          1517          0650          2146          1317          1231          0917          1008          0936          0657          WBC          7.9           --           --          7.5           --           --          5.7            < >        6.4            < >        8.0           HGB          8.1*         8.8*         7.8*         8.1*           < >         --          7.3*           < >        12.6*          < >        12.5*         MCV          86            --           --          84            --           --          85             < >        91             < >        98            PLT          67*           --           --          70*           --           --          72*            < >        66*            < >        120*          INR           --           --           --           --           --          1.22*         --           --          1.03          --          1.24*          < > = values in this interval not displayed.                  Lab Test        07/28/23 07/28/23 07/28/23 07/28/23 07/27/23 07/27/23 07/26/23 07/26/23                       1334          1153          0728          0653          0932          0650          1717          1231          NA            --           --           --          132*          --          131*          --          132*          POTASSIUM     --           --           --          3.8           --          3.8           --          4.1            CHLORIDE      --           --           --          100           --          100           --          99            CO2           --           --           --          21*           --          21*           --          22            BUN           --           --           --          15.6          --          17.8          --          21.8          CR            --           --           --          1.42*         --          1.56*         --          1.69*         ANIONGAP      --           --           --          11            --          10            --          11            MAGDALENO           --           --           --          8.3*          --          8.1*          --          8.8           GLC          167*         173*         185*         218*           < >        235*           < >        282*           < > = values in this interval not displayed.                 OUTSIDE LABS:  CBC:   Lab Results   Component Value Date    WBC 7.9 07/28/2023    WBC 7.5 07/27/2023    HGB 8.1 (L) 07/28/2023    HGB 8.8 (L) 07/27/2023    HCT 26.0 (L) 07/28/2023    HCT 25.1 (L) 07/27/2023    PLT 67 (L) 07/28/2023    PLT 70 (L) 07/27/2023     BMP:   Lab Results   Component Value Date     (L) 07/28/2023     (L) 07/27/2023    POTASSIUM 3.8 07/28/2023    POTASSIUM 3.8 07/27/2023    CHLORIDE 100 07/28/2023    CHLORIDE 100 07/27/2023    CO2 21 (L) 07/28/2023    CO2 21 (L) 07/27/2023    BUN 15.6 07/28/2023    BUN 17.8 07/27/2023    CR 1.42 (H) 07/28/2023    CR 1.56 (H) 07/27/2023     (H) 07/28/2023     (H) 07/28/2023     COAGS:   Lab Results   Component Value Date    PTT 26 05/13/2019    INR 1.22 (H) 07/26/2023     POC:   Lab Results   Component Value Date     (H) 08/24/2020     HEPATIC:   Lab Results   Component Value Date    ALBUMIN 3.2 (L) 07/26/2023    PROTTOTAL 6.4 07/26/2023    ALT 22 07/26/2023    AST 21 07/26/2023    ALKPHOS 103 07/26/2023    BILITOTAL 0.5 07/26/2023     OTHER:   Lab  Results   Component Value Date    LACT 1.9 09/10/2021    A1C 7.1 (H) 07/07/2023    MAGDALENO 8.3 (L) 07/28/2023    MAG 1.9 08/02/2021    TSH 1.94 06/22/2022    CRP 5.6 01/07/2022    SED 86 (H) 07/26/2023       Anesthesia Plan    ASA Status:  3       Anesthesia Type: MAC.     - Reason for MAC: straight local not clinically adequate              Consents    Anesthesia Plan(s) and associated risks, benefits, and realistic alternatives discussed. Questions answered and patient/representative(s) expressed understanding.     - Discussed: Risks, Benefits and Alternatives for the PROCEDURE were discussed     - Discussed with:  Patient      - Extended Intubation/Ventilatory Support Discussed: No.      - Patient is DNR/DNI Status: No     Use of blood products discussed: No .     Postoperative Care    Pain management: IV analgesics, Oral pain medications.   PONV prophylaxis: Ondansetron (or other 5HT-3), Background Propofol Infusion     Comments:              Audie Henson MD

## 2023-07-28 NOTE — PLAN OF CARE
PRIMARY DIAGNOSIS: GI BLEED    OUTPATIENT/OBSERVATION GOALS TO BE MET BEFORE DISCHARGE  Orthostatic performed: No    Stable Hgb Yes.   Recent Labs   Lab Test 07/28/23  0653 07/27/23  2257 07/27/23  1517   HGB 8.1* 8.8* 7.8*       Resolved or declined bleeding episodes: No,  without any bleeding Last episode: without any bleeding    Appropriate testing complete: Yes    Cleared for discharge by consultants (if involved): No    Safe discharge environment identified: Yes    Discharge Planner Nurse   Safe discharge environment identified: Yes  Barriers to discharge: Yes       Entered by: Julissa Grande RN 07/28/2023 6:47 PM     Please review provider order for any additional goals.   Nurse to notify provider when observation goals have been met and patient is ready for discharge.Goal Outcome Evaluation:

## 2023-07-28 NOTE — CONSULTS
[facility]  Colon and Rectal Surgery Consult Note  Name: Austin Garza    MRN: [id number]  YOB: 1940    Age: 83 year old  Date of admission: 7/26/2023  Primary care provider: Luma Kauffman     Requesting Physician:  Dr. Mejia  Reason for consult:  Hemorrhagic colonic mass           History of Present Illness:   Austin Garza is a 83 year old male with a history of HTN, HLD, inflammatory monoarthritis, chronic lymphocytic leukemia, abdominal aortic aneurysm on plavix, anemia, CKD, atrial fibrillation, diabetes, CAD, thrombocytopenia, idiopathic pericarditis, seen at the request of Dr. Mejia, presents with anemia.    The patient reports that he had been having some joint pain but was ultimately sent to the ED by his PCP given his low hemoglobin of 7.7 (baseline 9). In the ED, he was afebrile and all vital signs were reassuring. Labs were remarkable for a sodium 132, creatinine 1.69, albumin 3.2, .48, troponin 63, WBC 5.7, and a hgb of 7.3. He has been given 2 units of PRBC's this admission and his hgb is currently 8.1. GI was consulted for a colonoscopy. The colonoscopy revealed a mass in the transverse colon with central indentation concerning for malignancy. CRS was consulted for further management.    Today, the patient was resting comfortably in bed. He notes having some ongoing lightheadedness with position changes for a few months. He reports having one episode of bleeding that was in the toilet bowl yesterday. He otherwise had one previous episode of rectal bleeding that was a few months ago. He typically has a daily soft bowel movement. He denies any nausea, vomiting, fever/chills, or abdominal pain.    Of note, he had a colonoscopy in February of 2022 which revealed a large polyp in the transverse colon which was tattooed but not removed. He followed up with [facility] for removal and had a repeat colonoscopy in March of 2022 which was incomplete due to poor  prep and was told to return. Unfortunately he has not yet returned due to some cardiac issues that needed to be addressed.     Colonoscopy History:  7/28/23: Mass in the transverse colon with central indentation concerning for malignancy.     Surgical History: Appendectomy            Past Medical History:     Past Medical History:   Diagnosis Date     AAA (abdominal aortic aneurysm)      BCC (basal cell carcinoma of skin) - face      CLL (chronic lymphocytic leukemia)      Diaphragmatic hernia      Diverticulitis of colon      Esophageal reflux      Essential hypertension      Hyperlipidemia      Irritable bowel syndrome      Macular degeneration (senile) of retina      Mumps      Squamous Cell Carcinoma, Back 1988     Type 2 diabetes mellitus              Past Surgical History:     Past Surgical History:   Procedure Laterality Date     ANESTHESIA CARDIOVERSION N/A 8/2/2021    Procedure: ANESTHESIA, FOR CARDIOVERSION;  Surgeon: GENERIC ANESTHESIA PROVIDER;  Location: [facility] OR     APPENDECTOMY OPEN  1966     BONE MARROW BIOPSY, BONE SPECIMEN, NEEDLE/TROCAR N/A 11/21/2017    Procedure: BIOPSY BONE MARROW;  BONE MARROW BIOPSY;  Surgeon: Shady Garcia MD;  Location:  GI     COLONOSCOPY  2002     CV CORONARY ANGIOGRAM N/A 6/29/2022    Procedure: Coronary Angiogram;  Surgeon: Evaristo Beaulieu MD;  Location:  HEART CARDIAC CATH LAB     ENDOVASCULAR REPAIR ANEURYSM ABDOMINAL AORTA N/A 12/4/2017    Procedure: ENDOVASCULAR REPAIR ANEURYSM ABDOMINAL AORTA;  ENDOVASCULAR REPAIR ANEURYSM ABDOMINAL AORTA;  Surgeon: Milton Cazares MD;  Location:  OR     Fistulotomy with marsupialization for repair of fisture in ano  2007     IR ABDOMINAL AORTOGRAM  3/11/2019     IR VISCERAL EMBOLIZATION  5/13/2019     Multiple skin tags - resection  1998     Squamous cell skin cancer resection - back  1985-90     VASECTOMY                 Social History:     Social History     Tobacco Use     Smoking status: Former      "Packs/day: 0.50     Years: 20.00     Pack years: 10.00     Types: Cigarettes     Quit date: 1975     Years since quittin.6     Smokeless tobacco: Former   Substance Use Topics     Alcohol use: Yes     Alcohol/week: 10.0 - 14.0 standard drinks of alcohol     Types: 10 - 14 Standard drinks or equivalent per week     Comment: 3 drinks a day/wine             Family History:     Family History   Problem Relation Age of Onset     Cerebrovascular Disease Father         x 2     Hypertension Mother      C.A.D. Mother      Cancer Mother         skin     Eye Disorder Mother         glaucoma     Prostate Cancer No family hx of      Cancer - colorectal No family hx of      Glaucoma No family hx of      Macular Degeneration No family hx of              Allergies:     Allergies   Allergen Reactions     Ace Inhibitors Cough     cough  cough             Medications:       acetaminophen  975 mg Oral Q8H     diclofenac  4 g Topical 4x Daily    And     lidocaine   Topical 4x Daily     gabapentin  100 mg Oral TID     hydrOXYzine  10 mg Oral Q6H     insulin aspart  1-7 Units Subcutaneous TID AC     insulin aspart  1-5 Units Subcutaneous At Bedtime     insulin glargine  7 Units Subcutaneous QAM AC     metoprolol succinate ER  100 mg Oral Daily     pantoprazole  40 mg Oral BID AC     predniSONE  20 mg Oral Daily     rosuvastatin  20 mg Oral Daily     sodium chloride (PF)  3 mL Intracatheter Q8H             Review of Systems:   A comprehensive greater than 10 system review of systems was carried out.  Pertinent positives and negatives are noted above.  Otherwise negative for contributory info.            Physical Exam:     Blood pressure (!) 144/67, pulse 88, temperature 97.7  F (36.5  C), temperature source Oral, resp. rate 19, height 1.753 m (5' 9\"), weight 89.2 kg (196 lb 11.2 oz), SpO2 97 %.    Intake/Output Summary (Last 24 hours) at 2023 1658  Last data filed at 2023 1448  Gross per 24 hour   Intake 3438 ml   Output " --   Net 3438 ml     EXAM:  GEN: Awake alert and oriented, appears  stated age  PULM: Non-labored breathing with normal respiratory effort  CVS: reg rate and rhythm, no peripheral edema  ABD: Soft, non- tender, non- distended. No rebound or guarding   RECTAL: Rectal exam was deferred  NEURO: CN II-XII grossly intact  MSK: extremeties with no clubbing, cyanosis or edema; able to ambulate  PSYCH: responsive, alert, cooperative; oriented x3; appropriate mood and affect  EXT/SKIN: inspection reveals no rashes, lesions or ulcers, normal coloring         Data Reviewed:     Results for orders placed or performed during the hospital encounter of 23   Echocardiogram Complete     Value    LVEF  55-60%    Narrative    [id number]  [id number]  [id number]  [id number]^PRINCE^SHANIA^GINNY     [facility]  Echocardiography Laboratory  [address] [place] [address]     Name: MARK MORA  MRN: [id number]  : 1940  Study Date: 2023 12:59 PM  Age: 83 yrs  Gender: Male  Patient Location: [facility]  Reason For Study: CAD  Ordering Physician: SHANIA MORRISON  Referring Physician: Arlen Kauffman  Performed By: Jey Koo RDCS     BSA: 2.1 m2  Height: 69 in  Weight: 198 lb  HR: 84  BP: 142/84 mmHg  ______________________________________________________________________________  Procedure  Complete Portable Echo Adult.  ______________________________________________________________________________  Interpretation Summary     The left ventricle is normal in size.  Left ventricular systolic function is normal.  The visual ejection fraction is 55-60%.  The right ventricle is normal size.  The right ventricular systolic function is normal.  Normal left atrial size.  The ascending aorta is Moderately dilated.  The inferior vena cava was normal in size with preserved respiratory  variability.  ______________________________________________________________________________  Left Ventricle  The left ventricle  is normal in size. There is normal left ventricular wall  thickness. Left ventricular systolic function is normal. The visual ejection  fraction is 55-60%. No regional wall motion abnormalities noted.     Right Ventricle  The right ventricle is normal size. The right ventricular systolic function is  normal.     Atria  Normal left atrial size. Right atrial size is normal.     Mitral Valve  The mitral valve leaflets appear normal. There is no evidence of stenosis,  fluttering, or prolapse. There is trace mitral regurgitation.     Tricuspid Valve  Normal tricuspid valve. There is trace to mild tricuspid regurgitation. The  right ventricular systolic pressure is approximated at 25mmHg plus the right  atrial pressure.     Aortic Valve  The aortic valve is trileaflet. There is mild (1+) aortic regurgitation. Mild  valvular aortic stenosis.     Pulmonic Valve  The pulmonic valve is not well visualized.     Vessels  Moderate aortic root dilatation. The ascending aorta is Moderately dilated.  The inferior vena cava was normal in size with preserved respiratory  variability.     Pericardium  There is no pericardial effusion.     Rhythm  Sinus rhythm was noted.  ______________________________________________________________________________  MMode/2D Measurements & Calculations  IVSd: 1.2 cm     LVIDd: 4.6 cm  LVIDs: 3.3 cm  LVPWd: 1.1 cm  IVC diam: 1.7 cm  FS: 29.2 %  LV mass(C)d: 186.4 grams  LV mass(C)dI: 90.6 grams/m2  Ao root diam: 4.4 cm  asc Aorta Diam: 4.6 cm  LVOT diam: 2.0 cm  LVOT area: 3.1 cm2  LA Volume (BP): 44.7 ml  LA Volume Index (BP): 21.7 ml/m2  RWT: 0.46  TAPSE: 1.4 cm     Time Measurements  Aortic HR: 84.0 BPM     Doppler Measurements & Calculations  MV E max boris: 84.6 cm/sec  MV A max boris: 123.0 cm/sec  MV E/A: 0.69  MV max P.8 mmHg  MV mean P.0 mmHg  MV V2 VTI: 21.6 cm  MVA(VTI): 2.8 cm2  MV dec time: 0.19 sec  Ao V2 max: 197.0 cm/sec  Ao max P.0 mmHg  Ao V2 mean: 138.0 cm/sec  Ao mean PG:  9.0 mmHg  Ao V2 VTI: 36.8 cm  NELA(I,D): 1.6 cm2  NELA(V,D): 1.7 cm2  AI P1/2t: 504.6 msec  LV V1 max P.9 mmHg  LV V1 max: 109.6 cm/sec  LV V1 VTI: 19.1 cm  CO(LVOT): 5.1 l/min  CI(LVOT): 2.5 l/min/m2  SV(LVOT): 60.2 ml  SI(LVOT): 29.2 ml/m2  PA acc time: 0.12 sec  TR max venkat: 248.3 cm/sec  TR max P.7 mmHg  AV Venkat Ratio (DI): 0.56  NELA Index (cm2/m2): 0.79     E/E' av.0  Lateral E/e': 6.1  Medial E/e': 7.8  RV S Venkat: 13.2 cm/sec     ______________________________________________________________________________  Report approved by: Sharonda Joyner 2023 02:42 PM               Recent Labs   Lab 23  0653 23  2257 23  1517 23  0650 23  2146 23  1231   WBC 7.9  --   --  7.5  --  5.7   HGB 8.1* 8.8* 7.8* 8.1*   < > 7.3*   HCT 26.0*  --   --  25.1*  --  23.6*   MCV 86  --   --  84  --  85   PLT 67*  --   --  70*  --  72*    < > = values in this interval not displayed.     Recent Labs   Lab 23  1502 23  1334 23  1153 23  0728 23  0653 23  0932 23  0650 23  1717 23  1231   NA  --   --   --   --  132*  --  131*  --  132*   POTASSIUM  --   --   --   --  3.8  --  3.8  --  4.1   CHLORIDE  --   --   --   --  100  --  100  --  99   CO2  --   --   --   --  21*  --  21*  --  22   ANIONGAP  --   --   --   --  11  --  10  --  11   * 167* 173*   < > 218*   < > 235*   < > 282*   BUN  --   --   --   --  15.6  --  17.8  --  21.8   CR  --   --   --   --  1.42*  --  1.56*  --  1.69*   GFRESTIMATED  --   --   --   --  50*  --  44*  --  41*   MAGDALENO  --   --   --   --  8.3*  --  8.1*  --  8.8   PROTTOTAL  --   --   --   --   --   --   --   --  6.4   ALBUMIN  --   --   --   --   --   --   --   --  3.2*   BILITOTAL  --   --   --   --   --   --   --   --  0.5   ALKPHOS  --   --   --   --   --   --   --   --  103   AST  --   --   --   --   --   --   --   --  21   ALT  --   --   --   --   --   --   --   --  22    < > = values in this  interval not displayed.     Recent Labs   Lab 07/26/23  1317   INR 1.22*     Recent Labs   Lab 07/28/23  1644   LACT 0.9     Recent Labs   Lab 07/26/23  1036   COLOR Yellow   APPEARANCE Clear   URINEGLC Negative   URINEBILI Negative   URINEKETONE Negative   SG 1.020   UBLD Small*   URINEPH 5.5   PROTEIN 100*   UROBILINOGEN 0.2   NITRITE Negative   LEUKEST Negative   RBCU 0-2   WBCU None Seen         Assessment and Plan:   Austin Garza is a 83 year old male with a history of HTN, HLD, inflammatory monoarthritis, chronic lymphocytic leukemia, abdominal aortic aneurysm on plavix,anemia, CKD, atrial fibrillation, diabetes, CAD, thrombocytopenia, idiopathic pericarditis, seen at the request of Dr. Mejia, presents with anemia. Hgb 7.3 on arrival. He has received 2 units of PRBC's. Hgb currently 8.1. GI performed a colonoscopy today which revealed a mass in the transverse colon with central indentation concerning for potential malignancy. Biopsies were taken. His abdominal exam is benign. He has not had any further bleeding and has been having daily bowel movements. He is currently hemodynamically stable. No emergent surgery is indicated at this time. We will await the final pathology of the biopsies. Will order a CEA. Okay for a regular diet for now from a colorectal perspective.    Plan:  1. Surgery: No emergent surgery indicated  2. Diet: Regular  3. IV Fluids: Per hospitalist  4. Antibiotics:  Not indicated  5. Medications: Continue home meds per hospitalist  6. I&O s:  strict I&O s  7. Labs:   - Reviewed  - Ordered: None   8. Imaging:   - Dr. Carvajal and myself have personally viewed: Colonoscopy report  - Ordered: CEA  9. Activity: ambulate as tolerated, encourage OOB  10. DVT prophylaxis: SCD s  11. This plan has been discussed with Dr. Carvajal    Patient specific identified risk factors considered as part of today s evaluation include: CLL, abdominal aortic aneurysm on plavix, CKD, atrial fibrillation, diabetes,  previous abdominal surgeries    Additional history obtained from chart and patient.  Time spent on consultation: 60 minutes, greater than 50 percent of the total encounter time is spent in counseling and/or coordination of care          Beth Tovar PA-C  [facility]  Phone:  [phone]

## 2023-07-28 NOTE — PLAN OF CARE
PRIMARY DIAGNOSIS: GI BLEED    OUTPATIENT/OBSERVATION GOALS TO BE MET BEFORE DISCHARGE  Orthostatic performed: No    Stable Hgb Yes.   Recent Labs   Lab Test 07/27/23  2257 07/27/23  1517 07/27/23  0650   HGB 8.8* 7.8* 8.1*       Resolved or declined bleeding episodes: No,  without any bleeding    Appropriate testing complete: No, plan for colonoscopy today    Cleared for discharge by consultants (if involved): No    Discharge Planner Nurse   Safe discharge environment identified: Yes  Barriers to discharge: Yes       Entered by: Svetlana Marin RN 07/28/2023    A&O x4. [place]. VSS, RA. Up w/ SBA; steady gait. Denies any pain or discomfort at this time. NPO since midnight for colonoscopy scheduled for 2PM today. Sleeping b/t cares and able to make needs known.   Please review provider order for any additional goals.   Nurse to notify provider when observation goals have been met and patient is ready for discharge.

## 2023-07-28 NOTE — PLAN OF CARE
PRIMARY DIAGNOSIS: GI BLEED    OUTPATIENT/OBSERVATION GOALS TO BE MET BEFORE DISCHARGE  Orthostatic performed: N/A    Stable Hgb No.   Recent Labs   Lab Test 07/27/23  1517 07/27/23  0650 07/26/23  2146   HGB 7.8* 8.1* 6.9*       Resolved or declined bleeding episodes: Yes Last episode: AM    Appropriate testing complete:     Cleared for discharge by consultants (if involved): Yes    Safe discharge environment identified: No    Discharge Planner Nurse   Safe discharge environment identified: Yes  Barriers to discharge: Yes/ colonoscopy/tomorrow       Entered by: Delfino Gomez RN 07/27/2023 11:07 PM     Please review provider order for any additional goals.   Nurse to notify provider when observation goals have been met and patient is ready for discharge.Goal Outcome Evaluation:

## 2023-07-28 NOTE — ANESTHESIA CARE TRANSFER NOTE
Patient: Austin Garza    Procedure: Procedure(s):  COLONOSCOPY       Diagnosis: Anemia due to blood loss, acute [D62]  Diagnosis Additional Information: No value filed.    Anesthesia Type:   MAC     Note:    Oropharynx: oropharynx clear of all foreign objects  Level of Consciousness: drowsy  Oxygen Supplementation: room air    Independent Airway: airway patency satisfactory and stable  Dentition: dentition unchanged  Vital Signs Stable: post-procedure vital signs reviewed and stable  Report to RN Given: handoff report given  Patient transferred to: PACU    Handoff Report: Identifed the Patient, Identified the Reponsible Provider, Reviewed the pertinent medical history, Discussed the surgical course, Reviewed Intra-OP anesthesia mangement and issues during anesthesia, Set expectations for post-procedure period and Allowed opportunity for questions and acknowledgement of understanding      Vitals:  Vitals Value Taken Time   BP     Temp     Pulse     Resp     SpO2 100 % 07/28/23 1457   Vitals shown include unvalidated device data.    Electronically Signed By: GISELL Lewis CRNA  July 28, 2023  2:58 PM

## 2023-07-28 NOTE — PLAN OF CARE
Patient had 3/4 of second jar of jose villalobos by 9:20, primary RN spoke with GI department, had OK to give important meds with sips of water - metoprolol po given, rest of the morning meds not given due to NPO status for colonoscopy.

## 2023-07-28 NOTE — PROGRESS NOTES
[facility]    Medicine Progress Note - Hospitalist Service    Date of Admission:  7/26/2023    Assessment & Plan     Austin Garza is a 82 year old male with PMH of HTN, hyperlipidemia, inflammatory monoarthritis, chronic lymphocytic leukemia, abdominal aortic aneurysm, anemia, chronic kidney disease, atrial fibrillation, diabetes, CAD, thrombocytopenia, idiopathic pericarditis who presents to ED from primary care's office for evaluation of abnormal labs and pain in multiple joints.     Acute blood loss anemia on chronic anemia.  Gastrointestinal hemorrhage/hematochezia.  Hemorrhagic colonic mass.  -Appreciate gastroenterology input.  -Had colonoscopy 7/28.  -I discussed findings with gastroenterology after colonoscopy.  -Noted to have colonic mass with adherent clot.    -Appearance of mass concerning for possible malignancy.  -Biopsy sent during colonoscopy.  -Consult colorectal surgery.  -Change IV pantoprazole to oral 40 mg twice a day.  -Full liquid diet.  -Recheck CBC tomorrow.  -Transfuse as needed.  -Continue to hold clopidogrel.     Coronary artery disease.    Mild troponin elevation.  Hypertension.  Hyperlipidemia.  Paroxysmal atrial fibrillation.  -Suspect type II myocardial infarction due to demand ischemia from anemia.  -Repeat troponin level decreasing.  -No chest pain.  -Monitor on telemetry.  -Echocardiogram on 7/27 with EF 55 to 60% and no WMA's.  -Hold clopidogrel as above.  -Continue metoprolol succinate 100 mg a day.  -Continue rosuvastatin 20 mg a day.     Diabetes mellitus type 2.  -Continue to hold dulaglutide.  -Continue glargine insulin 7 units a day.  -Continue aspart insulin sliding scale as needed.     Polyarticular arthritis.  -Rheumatoid factor significantly elevated at 92.  -Should follow-up with rheumatology in the outpatient setting.  -No obvious infection.  -Continue prednisone 20 mg a day.  -Pain medications as needed.  -Pain management team consult.    "  Chronic kidney disease stage IIIb.  Hyponatremia.  -Creatinine appears to be at baseline.  -Avoid nephrotoxins as able.  -Sodium mildly low at 132.  -Continue gentle IV fluids  -Recheck metabolic panel tomorrow.     CLL.  Chronic thrombocytopenia.  -Platelets at baseline.  -Recheck CBC tomorrow.        Diet: Full Liquid Diet    DVT Prophylaxis: Pneumatic Compression Devices  Siddiqui Catheter: Not present  Lines: None     Cardiac Monitoring: None  Code Status: Full Code      Clinically Significant Risk Factors Present on Admission              # Hypoalbuminemia: Lowest albumin = 3.2 g/dL at 7/26/2023 12:31 PM, will monitor as appropriate  # Coagulation Defect: INR = 1.22 (Ref range: 0.85 - 1.15) and/or PTT = N/A, will monitor for bleeding  # Drug Induced Platelet Defect: home medication list includes an antiplatelet medication   # Hypertension: Noted on problem list     # DMII: A1C = 7.1 % (Ref range: 0.0 - 5.6 %) within past 6 months    # Overweight: Estimated body mass index is 29.05 kg/m  as calculated from the following:    Height as of this encounter: 1.753 m (5' 9\").    Weight as of this encounter: 89.2 kg (196 lb 11.2 oz).            Disposition Plan     Expected Discharge Date: 07/29/2023                  Augusto Mejia DO  Hospitalist Service  [facility]  Securely message with [employer] (more info)  Text page via [facility] Paging/Directory   ______________________________________________________________________    Interval History   Joint pain quite a bit improved today.    Denies chest pain, shortness of breath, fevers, chills, nausea, vomiting.    Physical Exam   Vital Signs: Temp: 97.7  F (36.5  C) Temp src: Oral BP: (!) 153/80 Pulse: 79   Resp: 16 SpO2: 98 % O2 Device: None (Room air)    Weight: 196 lbs 11.2 oz    Gen:  NAD, A&Ox3.  Eyes:  PERRL, sclera anicteric.  OP:  MMM, no lesions.  Neck:  Supple.  CV:  Regular, no murmurs.  Lung:  CTA b/l, normal effort.  Ab:  +BS, " soft.  Skin:  Warm, dry to touch.  No rash.  Ext:  No pitting edema LE b/l.      Medical Decision Making       43 MINUTES SPENT BY ME on the date of service doing chart review, history, exam, documentation & further activities per the note.      Data     I have personally reviewed the following data over the past 24 hrs:    7.9  \   8.1 (L)   / 67 (L)     132 (L) 100 15.6 /  160 (H)   3.8 21 (L) 1.42 (H) \       Imaging results reviewed over the past 24 hrs:   No results found for this or any previous visit (from the past 24 hour(s)).

## 2023-07-28 NOTE — PLAN OF CARE
PRIMARY DIAGNOSIS: GI BLEED    OUTPATIENT/OBSERVATION GOALS TO BE MET BEFORE DISCHARGE  Orthostatic performed: No    Stable Hgb Yes.   Recent Labs   Lab Test 07/27/23  2257 07/27/23  1517 07/27/23  0650   HGB 8.8* 7.8* 8.1*       Resolved or declined bleeding episodes: No,  without any bleeding    Appropriate testing complete: No, plan for colonoscopy today    Cleared for discharge by consultants (if involved): No    Discharge Planner Nurse   Safe discharge environment identified: Yes  Barriers to discharge: Yes       Entered by: Svetlana Marin RN 07/28/2023    A&O x4. [place], aids at bedside. VSS, RA. Up w/ SBA; steady gait. Denies any pain or discomfort at this time. NPO since midnight for colonoscopy scheduled for 2PM today. Sleeping b/t cares and able to make needs known.   Please review provider order for any additional goals.   Nurse to notify provider when observation goals have been met and patient is ready for discharge.

## 2023-07-28 NOTE — CARE PLAN
Patient Transfer Information  Patient connected to monitoring equipment on arrival: yes Capnography     Patient connected to wall oxygen on arrival: N/A    Belongings: Transferred with patient    Safety check completed: Yes

## 2023-07-28 NOTE — TELEPHONE ENCOUNTER
Routing refill request to provider for review/approval because:  Labs not current:  LDL    Cici Chavarria RN

## 2023-07-28 NOTE — PLAN OF CARE
Goal Outcome Evaluation:         Admitting Diagnosis: GI Bleed  Pertinent History: HTN, hyperlipidemia, chronic leukemia, anemia, CKD ,Afib, DM.  For vitals and assessment please see flow sheet.   Living Situation: Home  Pain plan: c/o joint pain, schedule meds given.   Mobility:  assistance, 1 person  Baseline activity: Independent.  Alarms/Safety: Bed Alarm  LDA's:  Peripheral  Pertinent test results: K+ 3.8, Cr: 1.56, B & 255.  Consults:  GI following.    Abnormals/Pending:hgb: 8.8  Other Cares/Comments: A & O, VSS, tele SR, LS clear, O2 96% RA. IVF infusing. NPO after midnight  Discharge Disposition:TBD.   Home with Self care  Discharge Time:  TBD.

## 2023-07-28 NOTE — INTERVAL H&P NOTE
"I have reviewed the surgical (or preoperative) H&P that is linked to this encounter, and examined the patient. There are no significant changes    Clinical Conditions Present on Arrival:  Clinically Significant Risk Factors Present on Admission         # Hyponatremia: Lowest Na = 131 mmol/L in last 30 days, will monitor as appropriate   # Hypocalcemia: Lowest Ca = 8.1 mg/dL in last 30 days, will monitor and replace as appropriate    # Hypoalbuminemia: Lowest albumin = 3.2 g/dL at 7/26/2023 12:31 PM, will monitor as appropriate   # Coagulation Defect: INR = 1.22 (Ref range: 0.85 - 1.15) and/or PTT = N/A, will monitor for bleeding  # Drug Induced Platelet Defect: home medication list includes an antiplatelet medication  # DMII: A1C = 7.1 % (Ref range: 0.0 - 5.6 %) within past 6 months  # Overweight: Estimated body mass index is 29.05 kg/m  as calculated from the following:    Height as of this encounter: 1.753 m (5' 9\").    Weight as of this encounter: 89.2 kg (196 lb 11.2 oz).       "

## 2023-07-28 NOTE — ANESTHESIA POSTPROCEDURE EVALUATION
Patient: Austin Garza    Procedure: Procedure(s):  COLONOSCOPY       Anesthesia Type:  MAC    Note:  Disposition: Outpatient   Postop Pain Control: Uneventful            Sign Out: Well controlled pain   PONV: No   Neuro/Psych: Uneventful            Sign Out: Acceptable/Baseline neuro status   Airway/Respiratory: Uneventful            Sign Out: Acceptable/Baseline resp. status   CV/Hemodynamics: Uneventful            Sign Out: Acceptable CV status; No obvious hypovolemia; No obvious fluid overload   Other NRE:    DID A NON-ROUTINE EVENT OCCUR?            Last vitals:  Vitals Value Taken Time   /80 07/28/23 1515   Temp 96.5  F (35.8  C) 07/28/23 1516   Pulse 87 07/28/23 1523   Resp 10 07/28/23 1523   SpO2 97 % 07/28/23 1523   Vitals shown include unvalidated device data.    Electronically Signed By: Audie Henson MD  July 28, 2023  3:24 PM

## 2023-07-29 NOTE — PLAN OF CARE
"Goal Outcome Evaluation:       Pt. A&O, one assist for mobility, had some pain, PRN Dilaudid was given, on BG check, voided adequately, ate 50% of his felicity., has edema + 2+3 in all extremities, spouse present at bed side.      BP (!) 141/72 (BP Location: Right arm)   Pulse 88   Temp 97  F (36.1  C) (Oral)   Resp 18   Ht 1.753 m (5' 9\")   Wt 91.7 kg (202 lb 2.6 oz)   SpO2 96%   BMI 29.85 kg/m              "

## 2023-07-29 NOTE — PLAN OF CARE
"BP (!) 148/73 (BP Location: Left arm)   Pulse 82   Temp 98  F (36.7  C) (Oral)   Resp 16   Ht 1.753 m (5' 9\")   Wt 91.7 kg (202 lb 2.6 oz)   SpO2 98%   BMI 29.85 kg/m        Shift Summary Note  [id number]    VSS: elevated BPsdenies SOB, O2 sats stable on RA., CAPNO on d/t procedure on 7/28  Pain: new pain in left hip during morning, no pain overnight, pain with movement  Orientation: AOX4  Mobility: A1 GB  /GI: Urinal bedside  LDAs: R PIV infusing NS @ 75cc/hr  Diet: Full Liquid, order to advance to regular    Labs/Protocols: Hgb - 8.1     Plan of Care: Results pending from Colonoscopy. Surgery TBD   Discharge plan: TBD home w spouse      Ita De Oliveira, RN  July 29, 2023  6:53 AM     Goal Outcome Evaluation:           Overall Patient Progress: improving  Overall Patient Progress: improving           "

## 2023-07-29 NOTE — PROGRESS NOTES
COLON & RECTAL SURGERY  PROGRESS NOTE    July 29, 2023    SUBJECTIVE: Denies abdominal symptoms.  No further blood in the stool.  Remains hemodynamically stable and hemoglobin is stable.  Awaiting biopsy results from his transverse colon mass.  Main complaint is migrating severe joint pain and inability to lift his left leg.    OBJECTIVE:  Temp:  [96.5  F (35.8  C)-98.1  F (36.7  C)] 97  F (36.1  C)  Pulse:  [79-96] 83  Resp:  [13-24] 18  BP: (119-174)/(63-97) 137/81  SpO2:  [92 %-100 %] 96 %    Intake/Output Summary (Last 24 hours) at 7/29/2023 0857  Last data filed at 7/29/2023 0500  Gross per 24 hour   Intake 1827 ml   Output 475 ml   Net 1352 ml       GENERAL:  Awake, alert, no acute distress  EXTREMITIES: Warm and well perfused  ABDOMEN:  Soft, non tender, non-distended.     LABS:  Lab Results   Component Value Date    WBC 5.7 07/29/2023    WBC 12.7 06/01/2021     Lab Results   Component Value Date    HGB 7.9 07/29/2023    HGB 13.5 06/01/2021     Lab Results   Component Value Date    HCT 25.1 07/29/2023    HCT 41.3 06/01/2021     Lab Results   Component Value Date    PLT 72 07/29/2023     06/01/2021     Last Basic Metabolic Panel:  Lab Results   Component Value Date     07/29/2023     06/01/2021      Lab Results   Component Value Date    POTASSIUM 3.7 07/29/2023    POTASSIUM 3.9 08/02/2022    POTASSIUM 4.2 06/01/2021     Lab Results   Component Value Date    CHLORIDE 102 07/29/2023    CHLORIDE 103 08/02/2022    CHLORIDE 104 06/01/2021     Lab Results   Component Value Date    MAGDALENO 8.1 07/29/2023    MAGDALENO 8.9 06/01/2021     Lab Results   Component Value Date    CO2 22 07/29/2023    CO2 24 08/02/2022    CO2 30 06/01/2021     Lab Results   Component Value Date    BUN 12.9 07/29/2023    BUN 20 08/02/2022    BUN 26 06/01/2021     Lab Results   Component Value Date    CR 1.52 07/29/2023    CR 1.51 06/01/2021     Lab Results   Component Value Date     07/29/2023     07/29/2023      08/02/2022     06/01/2021       ASSESSMENT/PLAN: Austin Garza is a 83 year old male with history of hypertension, hyperlipidemia, inflammatory arthritis, CLL, AAA on Plavix, anemia, CKD, atrial fibrillation, diabetes, CAD who was admitted with anemia.  His hemoglobin is currently stable and he is having no other evidence of GI bleed.  Colonoscopy yesterday with a mass in the transverse colon concerning for malignancy.  Biopsies are pending at this time.  CEA has been ordered.    Okay for regular diet from a colorectal surgery standpoint.  If his hemoglobin remains stable, okay for discharge to home.  We can plan for outpatient follow-up of his biopsy results, an outpatient CT imaging for staging if the biopsy is consistent with a malignancy.  Elective surgical resection can then be arranged in the next 2 to 3 weeks as appropriate.  If Benji remains inpatient, CRS will follow peripherally and await biopsy results.  Once the biopsy results are available, we will make further recommendations.    For questions/paging, please contact the CRS office at [phone].    Vanessa Frankel MD  Colorectal Surgery    [facility]  [address] [address]  T: [phone]  F: [phone]

## 2023-07-29 NOTE — PROGRESS NOTES
[facility]    Medicine Progress Note - Hospitalist Service    Date of Admission:  7/26/2023    Assessment & Plan     Austin Garza is a 82 year old male with PMH of HTN, hyperlipidemia, inflammatory monoarthritis, chronic lymphocytic leukemia, abdominal aortic aneurysm, anemia, chronic kidney disease, atrial fibrillation, diabetes, CAD, thrombocytopenia, idiopathic pericarditis who presents to ED from primary care's office for evaluation of abnormal labs and pain in multiple joints.     Acute blood loss anemia on chronic anemia.  Gastrointestinal hemorrhage/hematochezia.  Hemorrhagic colonic mass.  -Appreciate gastroenterology input.  -Had colonoscopy 7/28.  -I discussed findings with gastroenterology after colonoscopy.  -Noted to have colonic mass with adherent clot.    -Appearance of mass concerning for possible malignancy.  -Biopsy sent during colonoscopy.  -Appreciate colorectal surgery input.  -Continue pantoprazole 40 mg twice a day.  -Advance to regular diet  -Recheck CBC tomorrow.  -Transfuse as needed.  -Continue to hold clopidogrel.     Coronary artery disease.    Mild troponin elevation.  Hypertension.  Hyperlipidemia.  Paroxysmal atrial fibrillation.  -Suspect type II myocardial infarction due to demand ischemia from anemia.  -Repeat troponin level decreasing.  -No chest pain.  -Monitor on telemetry.  -Echocardiogram on 7/27 with EF 55 to 60% and no WMA's.  -Hold clopidogrel as above.  -Continue metoprolol succinate 100 mg a day.  -Continue rosuvastatin 20 mg a day.     Diabetes mellitus type 2.  -Continue to hold dulaglutide.  -Continue glargine insulin 7 units a day.  -Start scheduled aspart insulin with meals on a carb counting basis.  -Continue aspart insulin sliding scale as needed.     Polyarticular arthritis.  Significantly increased left hip pain today.  -Rheumatoid factor significantly elevated at 92.  -Should follow-up with rheumatology in the outpatient setting.  -No  "obvious infection.  -Continue prednisone 20 mg a day.  -Pelvis and left hip x-ray on 7/29 negative for fracture.  -Pain medications as needed.  -Pain management team consult.     Chronic kidney disease stage IIIb.  Hyponatremia.  -Creatinine appears to be at baseline.  -Avoid nephrotoxins as able.  -Sodium mildly low at 135.  -Stop IV fluids  -Recheck metabolic panel tomorrow.     CLL.  Chronic thrombocytopenia.  -Platelets at baseline.  -Recheck CBC tomorrow.     Wife updated at bedside on 7/29.     Diet: Regular Diet Adult    DVT Prophylaxis: Pneumatic Compression Devices  Siddiqui Catheter: Not present  Lines: None     Cardiac Monitoring: None  Code Status: Full Code      Clinically Significant Risk Factors Present on Admission              # Hypoalbuminemia: Lowest albumin = 3.2 g/dL at 7/26/2023 12:31 PM, will monitor as appropriate   # Drug Induced Platelet Defect: home medication list includes an antiplatelet medication   # Hypertension: Noted on problem list     # DMII: A1C = 7.1 % (Ref range: 0.0 - 5.6 %) within past 6 months    # Overweight: Estimated body mass index is 29.85 kg/m  as calculated from the following:    Height as of this encounter: 1.753 m (5' 9\").    Weight as of this encounter: 91.7 kg (202 lb 2.6 oz).            Disposition Plan     Expected Discharge Date: 07/29/2023                  Augusto Mejia, DO  Hospitalist Service  [facility]  Securely message with [employer] (more info)  Text page via [facility] Paging/Directory   ______________________________________________________________________    Interval History   No bowel movement today.  Denies chest pain, shortness of breath, fevers, chills, nausea, or vomiting.    Physical Exam   Vital Signs: Temp: 97.4  F (36.3  C) Temp src: Oral BP: (!) 140/74 Pulse: 92   Resp: 16 SpO2: 97 % O2 Device: None (Room air)    Weight: 202 lbs 2.59 oz    Gen:  NAD, A&Ox3.  Eyes:  PERRL, sclera anicteric.  OP:  MMM, no lesions.  Neck:  " Supple.  CV: Fairly regular, no murmurs.  Lung:  CTA b/l, normal effort.  Ab:  +BS, soft.  Skin:  Warm, dry to touch.  No rash.  Ext:  No pitting edema LE b/l.      Medical Decision Making       32 MINUTES SPENT BY ME on the date of service doing chart review, history, exam, documentation & further activities per the note.      Data     I have personally reviewed the following data over the past 24 hrs:    5.7  \   7.9 (L)   / 72 (L)     135 (L) 102 12.9 /  322 (H)   3.7 22 1.52 (H) \       Imaging results reviewed over the past 24 hrs:   Recent Results (from the past 24 hour(s))   XR Pelvis and Hip Left 1 View    Narrative    EXAM: XR PELVIS AND HIP LEFT 1 VIEW  LOCATION: [facility]  DATE: 7/29/2023    INDICATION: left hip pain  COMPARISON: 09/26/2021 CT abdomen pelvis.      Impression    IMPRESSION: No fractures are evident. Mild degenerative changes in the hips. Endovascular stents and adjacent findings.

## 2023-07-29 NOTE — PLAN OF CARE
PRIMARY DIAGNOSIS: GI BLEED    Temp: 98.1  F (36.7  C) Temp src: Oral BP: 119/63 Pulse: 91   Resp: 16 SpO2: 97 % O2 Device: None (Room air)       Back from surgery at 1600. On capno. A&O. Tele discontinued. Up A1 gaitbelt. Tolerating full liquid diet. Mild joint pain. Controlled with scheduled pain meds. NS infusing at 75/hour. , 233. Triggered sepsis. Lactic 0.9. Vitals stable. Colorectal consult today. Safety checks completed.    OUTPATIENT/OBSERVATION GOALS TO BE MET BEFORE DISCHARGE  Orthostatic performed: No    Stable Hgb Yes.   Recent Labs   Lab Test 07/28/23  0653 07/27/23  2257 07/27/23  1517   HGB 8.1* 8.8* 7.8*       Resolved or declined bleeding episodes: Yes Last episode: yesterday    Appropriate testing complete: Yes    Cleared for discharge by consultants (if involved): No    Safe discharge environment identified: Yes    Discharge Planner Nurse   Safe discharge environment identified: Yes  Barriers to discharge: Yes       Entered by: Julissa Grande RN 07/28/2023 10:25 PM     Please review provider order for any additional goals.   Nurse to notify provider when observation goals have been met and patient is ready for discharge.Goal Outcome Evaluation:      Plan of Care Reviewed With: patient    Overall Patient Progress: improvingOverall Patient Progress: improving

## 2023-07-29 NOTE — PROGRESS NOTES
"PRIMARY DIAGNOSIS: GI BLEED    OUTPATIENT/OBSERVATION GOALS TO BE MET BEFORE DISCHARGE  Orthostatic performed: N/A    Stable Hgb Yes.   Recent Labs   Lab Test 07/29/23  0552 07/28/23  0653 07/27/23  2257   HGB 7.9* 8.1* 8.8*       Resolved or declined bleeding episodes: No,  without any bleeding Last episode: N/A    Appropriate testing complete: Yes    Cleared for discharge by consultants (if involved): No    Safe discharge environment identified: Yes    Discharge Planner Nurse   Safe discharge environment identified: Yes  Barriers to discharge: Yes       Entered by: Olivia Heaton RN 07/29/2023 08:00 AM   Pt. A&O, one assist for mobility, voided adequately, ate 50% of his felicity,. On BG check, had pain in his left hip, hands, volterra and lidocaine creams were applied.   BP (!) 141/72 (BP Location: Right arm)   Pulse 88   Temp 97  F (36.1  C) (Oral)   Resp 18   Ht 1.753 m (5' 9\")   Wt 91.7 kg (202 lb 2.6 oz)   SpO2 96%   BMI 29.85 kg/m     Please review provider order for any additional goals.   Nurse to notify provider when observation goals have been met and patient is ready for discharge.  "

## 2023-07-29 NOTE — PROGRESS NOTES
PRIMARY DIAGNOSIS: GI BLEED    OUTPATIENT/OBSERVATION GOALS TO BE MET BEFORE DISCHARGE  Orthostatic performed: No    Stable Hgb No.   Recent Labs   Lab Test 07/28/23  0653 07/27/23  2257 07/27/23  1517   HGB 8.1* 8.8* 7.8*       Resolved or declined bleeding episodes: Yes Last episode: NA    Appropriate testing complete: No    Cleared for discharge by consultants (if involved): No    Safe discharge environment identified: Yes    Discharge Planner Nurse          Entered by: Ita De Oliveira RN 07/29/2023 5:09 AM     Please review provider order for any additional goals.   Nurse to notify provider when observation goals have been met and patient is ready for discharge.

## 2023-07-29 NOTE — PROGRESS NOTES
PRIMARY DIAGNOSIS: GI BLEED    OUTPATIENT/OBSERVATION GOALS TO BE MET BEFORE DISCHARGE  Orthostatic performed: No    Stable Hgb No.   Recent Labs   Lab Test 07/28/23  0653 07/27/23  2257 07/27/23  1517   HGB 8.1* 8.8* 7.8*       Resolved or declined bleeding episodes: Yes Last episode: N/A    Appropriate testing complete: N/A    Cleared for discharge by consultants (if involved): No    Safe discharge environment identified: Yes    Discharge Planner Nurse          Entered by: Ita De Oliveira RN 07/29/2023 2:12 AM     Please review provider order for any additional goals.   Nurse to notify provider when observation goals have been met and patient is ready for discharge.

## 2023-07-30 NOTE — PLAN OF CARE
PRIMARY DIAGNOSIS: GI BLEED    OUTPATIENT/OBSERVATION GOALS TO BE MET BEFORE DISCHARGE  Orthostatic performed: No    Stable Hgb Yes.   Recent Labs   Lab Test 07/29/23  0552 07/28/23  0653 07/27/23  2257   HGB 7.9* 8.1* 8.8*       Resolved or declined bleeding episodes: Yes Last episode: 07/28    Appropriate testing complete: Yes    Cleared for discharge by consultants (if involved): No    Discharge Planner Nurse   Safe discharge environment identified: Yes  Barriers to discharge: Yes       Entered by: Svetlana Marin RN 07/30/2023    A&O x4. VSS, RA. Up w/ SBA and walker. Pt denies SOB, dizziness/lightheadedness or any other pain or discomfort at this time. No new episodes of bleeding. Pt able to call and make needs known.   Please review provider order for any additional goals.   Nurse to notify provider when observation goals have been met and patient is ready for discharge.

## 2023-07-30 NOTE — PLAN OF CARE
PRIMARY DIAGNOSIS: GI BLEED    OUTPATIENT/OBSERVATION GOALS TO BE MET BEFORE DISCHARGE  1. Orthostatic performed: No    2. Stable Hgb No.   Recent Labs   Lab Test 07/29/23  0552 07/28/23  0653 07/27/23  2257   HGB 7.9* 8.1* 8.8*       3. Resolved or declined bleeding episodes: Last episode: 7/28    4. Appropriate testing complete: No, awaiting biopsy results    5. Cleared for discharge by consultants (if involved): No    6. Safe discharge environment identified: Yes, home    Discharge Planner Nurse   Safe discharge environment identified: Yes  Barriers to discharge: Yes       Entered by: Nita Mason RN 07/29/2023 8:39 PM     Please review provider order for any additional goals.   Nurse to notify provider when observation goals have been met and patient is ready for discharge.

## 2023-07-30 NOTE — PLAN OF CARE
PRIMARY DIAGNOSIS: GI BLEED    OUTPATIENT/OBSERVATION GOALS TO BE MET BEFORE DISCHARGE  Orthostatic performed: No    Stable Hgb Yes.   Recent Labs   Lab Test 07/29/23  0552 07/28/23  0653 07/27/23  2257   HGB 7.9* 8.1* 8.8*       Resolved or declined bleeding episodes: Yes Last episode: 07/28    Appropriate testing complete: Yes    Cleared for discharge by consultants (if involved): No    Discharge Planner Nurse   Safe discharge environment identified: Yes  Barriers to discharge: Yes       Entered by: Svetlana Marin RN 07/30/2023    A&O x4. VSS, RA. Up w/ SBA and walker. Pt denies SOB, dizziness/lightheadedness or any other pain or discomfort at this time. No new episodes of bleeding. Per colorectal, if Hgb remains stable, pt OK for discharge home w/ plan to follow up outpatient with colonic mass biopsy results.   Please review provider order for any additional goals.   Nurse to notify provider when observation goals have been met and patient is ready for discharge.

## 2023-07-30 NOTE — DISCHARGE SUMMARY
"[facility]  Hospitalist Discharge Summary      Date of Admission:  7/26/2023  Date of Discharge:  7/30/2023  Discharging Provider: Андрей Velázquez MD  Discharge Service: Hospitalist Service    Discharge Diagnoses     Acute on chronic blood loss anemia.  Newly diagnosed colonic mass, suspicious for cancer.  Biopsy done and pathology pending.  Polyarthritis, elevated rheumatoid factor.  Coronary artery disease s/p stenting x4 in January 2022.  Diabetes mellitus, type II.  Essential hypertension.  Hyperlipidemia.  CKD stage IIIb.  CLL.  Chronic thrombocytopenia.  Hyponatremia.    Clinically Significant Risk Factors     # DMII: A1C = 7.1 % (Ref range: 0.0 - 5.6 %) within past 6 months  # Overweight: Estimated body mass index is 29.66 kg/m  as calculated from the following:    Height as of this encounter: 1.753 m (5' 9\").    Weight as of this encounter: 91.1 kg (200 lb 13.4 oz).       Follow-ups Needed After Discharge   Follow-up Appointments     Follow-up and recommended labs and tests       Follow up with primary care provider, Luma Kauffman, within 5 days for   hospital follow- up.  The following labs/tests are recommended: CBC and   BMP.      Follow-up with rheumatology outpatient for diffuse joint pain and elevated   rheumatoid factor.  Please call and schedule a follow-up with colorectal surgery if you do not   hear back from them within 1 week.        Hospitalist team and GI.    Unresulted Labs Ordered in the Past 30 Days of this Admission       Date and Time Order Name Status Description    7/28/2023  2:41 PM Surgical Pathology Exam In process     7/26/2023  4:07 PM Blood Culture Hand, Right Preliminary     7/26/2023  4:07 PM Blood Culture Hand, Left Preliminary         These results will be followed up by hospitalist team and gastroenterology will    Discharge Disposition   Discharged to home  Condition at discharge: Stable    Hospital Course   83 year old male with PMH of HTN, " hyperlipidemia, inflammatory monoarthritis, chronic lymphocytic leukemia, abdominal aortic aneurysm, anemia, chronic kidney disease, atrial fibrillation, diabetes, CAD, thrombocytopenia, idiopathic pericarditis who presents to ED from primary care's office for evaluation of abnormal labs and pain in multiple joints.      He had hemoglobin of 7.7 and was noted to have blood in his stool.  He received a unit of blood and underwent colonoscopy, suspicious for colon cancer.        Acute on chronic blood loss anemia secondary to GI bleed from colon mass in setting of being on Plavix  Colonoscopy on 7/28: Colonic mass with adherent clot found in transverse colon suspicious for cancer, biopsy pending.  Needed 1 unit of PRBC, hemoglobin is stable at around 8 now.  Colorectal surgery consult appreciated, plan to follow-up on biopsy results and outpatient and will need outpatient CT for staging if biopsy is consistent with malignancy.  Advised patient to follow-up with PCP and get CBC within 1 week.     CAD  Patient had PCI with drug-eluting stents x4 in November 2022.  He was given dual antiplatelet therapy for 6 months and plan was to continue lifelong Plavix.  Due to GI bleed will hold Plavix and discharge him on aspirin.  If he starts bleeding again on aspirin, will have to hold it again.  Once the patient is done with his colon surgery, he may go back on Plavix.     Diabetes mellitus type 2, uncontrolled.  Prior to admission on dulaglutide but as per the patient his blood sugars at home are 2-3 100s.  Dulaglutide was on hold while inpatient and was started on Lantus and sliding scale but was still hyperglycemic due to being on steroids.  Resume dulaglutide upon discharge and will add Lantus and sliding scale.    Polyarthritis.  Patient had joint pain and stiffness in multiple joints and started on prednisone.       Consultations This Hospital Stay   GASTROENTEROLOGY IP CONSULT  PAIN MANAGEMENT ADULT IP CONSULT  CARE  MANAGEMENT / SOCIAL WORK IP CONSULT  COLORECTAL SURGERY IP CONSULT    Code Status   Full Code    Time Spent on this Encounter   I, Андрей Velázquez MD, personally saw the patient today and spent greater than 30 minutes discharging this patient.       Андрей Velázquez MD  [place] [address] [facility]  [address]  [facility] [address]  Phone: [phone]  Fax: [phone]  ______________________________________________________________________    Physical Exam   Vital Signs: Temp: 97.4  F (36.3  C) Temp src: Oral BP: (!) 143/72 Pulse: 80   Resp: 16 SpO2: 98 % O2 Device: None (Room air)    Weight: 200 lbs 13.42 oz    Gen:  NAD, A&Ox3.  Eyes:  PERRL, sclera anicteric.  OP:  MMM, no lesions.  Neck:  Supple.  CV: Fairly regular, no murmurs.  Lung:  CTA b/l, normal effort.  Ab:  +BS, soft.  Skin:  Warm, dry to touch.  No rash.  Ext:  No pitting edema LE b/l.       Primary Care Physician   Luma Kauffman    Discharge Orders      Reason for your hospital stay    GI bleeding.  Colon mass.     Follow-up and recommended labs and tests     Follow up with primary care provider, Luma Kauffman, within 5 days for hospital follow- up.  The following labs/tests are recommended: CBC and BMP.      Follow-up with rheumatology outpatient for diffuse joint pain and elevated rheumatoid factor.  Please call and schedule a follow-up with colorectal surgery if you do not hear back from them within 1 week.     Activity    Your activity upon discharge: activity as tolerated     Diet    Follow this diet upon discharge: Orders Placed This Encounter      Regular Diet Adult       Significant Results and Procedures   Most Recent 3 CBC's:  Recent Labs   Lab Test 08/07/23  1059 08/03/23  1413 07/30/23  0653   WBC 13.7* 14.4* 6.3   HGB 8.5* 9.5* 7.9*   MCV 87 85 86   * 166 98*     Most Recent 3 BMP's:  Recent Labs   Lab Test 08/07/23  1059 08/03/23  1413 07/30/23  1204 07/30/23  0653   * 133*  --  137   POTASSIUM  5.0 4.5  --  4.2   CHLORIDE 99 96*  --  103   CO2 26 24  --  25   BUN 29.7* 31.4*  --  18.1   CR 1.60* 1.54*  --  1.55*   ANIONGAP 9 13  --  9   MAGDALENO 8.7* 9.2  --  8.5*   * 353* 219* 275*     Most Recent 2 LFT's:  Recent Labs   Lab Test 23  1059 23  1413   AST 58* 80*   * 133*   ALKPHOS 71 81   BILITOTAL 0.4 0.5   ,   Results for orders placed or performed during the hospital encounter of 23   XR Pelvis and Hip Left 1 View    Narrative    EXAM: XR PELVIS AND HIP LEFT 1 VIEW  LOCATION: [facility]  DATE: 2023    INDICATION: left hip pain  COMPARISON: 2021 CT abdomen pelvis.      Impression    IMPRESSION: No fractures are evident. Mild degenerative changes in the hips. Endovascular stents and adjacent findings.   Echocardiogram Complete     Value    LVEF  55-60%    Narrative    [id number]  [id number]  [id number]  [id number]^PRINCE^SHANIA^GINNY     [facility]  Echocardiography Laboratory  [address] [place] [address]     Name: MARK MORA  MRN: [id number]  : 1940  Study Date: 2023 12:59 PM  Age: 83 yrs  Gender: Male  Patient [facility]: [facility]  Reason For Study: CAD  Ordering Physician: SHANIA MORRISON  Referring Physician: Arlen Kauffman  Performed By: Jey Koo RDCS     BSA: 2.1 m2  Height: 69 in  Weight: 198 lb  HR: 84  BP: 142/84 mmHg  ______________________________________________________________________________  Procedure  Complete Portable Echo Adult.  ______________________________________________________________________________  Interpretation Summary     The left ventricle is normal in size.  Left ventricular systolic function is normal.  The visual ejection fraction is 55-60%.  The right ventricle is normal size.  The right ventricular systolic function is normal.  Normal left atrial size.  The ascending aorta is Moderately dilated.  The inferior vena cava was normal in size with preserved  respiratory  variability.  ______________________________________________________________________________  Left Ventricle  The left ventricle is normal in size. There is normal left ventricular wall  thickness. Left ventricular systolic function is normal. The visual ejection  fraction is 55-60%. No regional wall motion abnormalities noted.     Right Ventricle  The right ventricle is normal size. The right ventricular systolic function is  normal.     Atria  Normal left atrial size. Right atrial size is normal.     Mitral Valve  The mitral valve leaflets appear normal. There is no evidence of stenosis,  fluttering, or prolapse. There is trace mitral regurgitation.     Tricuspid Valve  Normal tricuspid valve. There is trace to mild tricuspid regurgitation. The  right ventricular systolic pressure is approximated at 25mmHg plus the right  atrial pressure.     Aortic Valve  The aortic valve is trileaflet. There is mild (1+) aortic regurgitation. Mild  valvular aortic stenosis.     Pulmonic Valve  The pulmonic valve is not well visualized.     Vessels  Moderate aortic root dilatation. The ascending aorta is Moderately dilated.  The inferior vena cava was normal in size with preserved respiratory  variability.     Pericardium  There is no pericardial effusion.     Rhythm  Sinus rhythm was noted.  ______________________________________________________________________________  MMode/2D Measurements & Calculations  IVSd: 1.2 cm     LVIDd: 4.6 cm  LVIDs: 3.3 cm  LVPWd: 1.1 cm  IVC diam: 1.7 cm  FS: 29.2 %  LV mass(C)d: 186.4 grams  LV mass(C)dI: 90.6 grams/m2  Ao root diam: 4.4 cm  asc Aorta Diam: 4.6 cm  LVOT diam: 2.0 cm  LVOT area: 3.1 cm2  LA Volume (BP): 44.7 ml  LA Volume Index (BP): 21.7 ml/m2  RWT: 0.46  TAPSE: 1.4 cm     Time Measurements  Aortic HR: 84.0 BPM     Doppler Measurements & Calculations  MV E max boris: 84.6 cm/sec  MV A max boris: 123.0 cm/sec  MV E/A: 0.69  MV max P.8 mmHg  MV mean P.0 mmHg  MV V2  VTI: 21.6 cm  MVA(VTI): 2.8 cm2  MV dec time: 0.19 sec  Ao V2 max: 197.0 cm/sec  Ao max P.0 mmHg  Ao V2 mean: 138.0 cm/sec  Ao mean P.0 mmHg  Ao V2 VTI: 36.8 cm  NELA(I,D): 1.6 cm2  NELA(V,D): 1.7 cm2  AI P1/2t: 504.6 msec  LV V1 max P.9 mmHg  LV V1 max: 109.6 cm/sec  LV V1 VTI: 19.1 cm  CO(LVOT): 5.1 l/min  CI(LVOT): 2.5 l/min/m2  SV(LVOT): 60.2 ml  SI(LVOT): 29.2 ml/m2  PA acc time: 0.12 sec  TR max venkat: 248.3 cm/sec  TR max P.7 mmHg  AV Venkat Ratio (DI): 0.56  NELA Index (cm2/m2): 0.79     E/E' av.0  Lateral E/e': 6.1  Medial E/e': 7.8  RV S Venkat: 13.2 cm/sec     ______________________________________________________________________________  Report approved by: Sharonda Joyner 2023 02:42 PM             Discharge Medications   Current Discharge Medication List        START taking these medications    Details   aspirin 81 MG EC tablet Take 1 tablet (81 mg) by mouth daily  Qty: 30 tablet, Refills: 1    Associated Diagnoses: Atherosclerosis of native coronary artery of native heart without angina pectoris      insulin aspart (NOVOLOG PEN) 100 UNIT/ML pen Inject 1-7 Units Subcutaneous 3 times daily (before meals) Do Not give Correction Insulin if Pre-Meal BG less than 140. For Pre-Meal  - 189 give 1 unit. For Pre-Meal  - 239 give 2 units. For Pre-Meal  - 289 give 3 units. For Pre-Meal  - 339 give 4 units. For Pre-Meal - 399 give 5 units. For Pre-Meal -449 give 6 units For Pre-Meal BG greater than or equal to 450 give 7 units.  Qty: 15 mL, Refills: 1    Associated Diagnoses: Type 2 diabetes mellitus with other specified complication, with long-term current use of insulin (H)      insulin glargine (LANTUS PEN) 100 UNIT/ML pen Inject 10 Units Subcutaneous every morning (before breakfast)  Qty: 15 mL, Refills: 0    Comments: If Lantus is not covered by insurance, may substitute Basaglar or Semglee or other insulin glargine product per insurance preference at same  "dose and frequency.    Associated Diagnoses: Type 2 diabetes mellitus with other specified complication, with long-term current use of insulin (H)      predniSONE (DELTASONE) 10 MG tablet Take 1 tablet (10 mg) by mouth daily  Qty: 30 tablet, Refills: 0    Associated Diagnoses: Polyarthritis           CONTINUE these medications which have NOT CHANGED    Details   Dulaglutide 4.5 MG/0.5ML SOPN Inject 4.5 mg Subcutaneous once a week  Qty: 6 mL, Refills: 4    Associated Diagnoses: Type 2 diabetes mellitus with other specified complication, with long-term current use of insulin (H)      metoprolol succinate ER (TOPROL XL) 100 MG 24 hr tablet Take 1 tablet (100 mg) by mouth daily  Qty: 90 tablet, Refills: 4    Associated Diagnoses: Cardiomyopathy, unspecified type (H); Abdominal aortic aneurysm (AAA) without rupture, unspecified part (H)      nitroGLYcerin (NITROSTAT) 0.4 MG sublingual tablet One tablet under the tongue every 5 minutes if needed for chest pain. May repeat every 5 minutes for a maximum of 3 doses in 15 minutes\"  Qty: 25 tablet, Refills: 3    Associated Diagnoses: Coronary artery disease involving native coronary artery of native heart without angina pectoris      pantoprazole (PROTONIX) 40 MG EC tablet Take 1 tablet (40 mg) by mouth daily  Qty: 90 tablet, Refills: 4    Associated Diagnoses: Gastroesophageal reflux disease, unspecified whether esophagitis present      rosuvastatin (CRESTOR) 20 MG tablet Take 1 tablet (20 mg) by mouth daily  Qty: 90 tablet, Refills: 4    Associated Diagnoses: Atrial fibrillation, unspecified type (H)           STOP taking these medications       clopidogrel (PLAVIX) 75 MG tablet Comments:   Reason for Stopping:             Allergies   Allergies   Allergen Reactions    Ace Inhibitors Cough     cough  cough     "

## 2023-07-30 NOTE — PLAN OF CARE
"  Problem: Plan of Care - These are the overarching goals to be used throughout the patient stay.    Goal: Patient-Specific Goal (Individualized)  Description: You can add care plan individualizations to a care plan. Examples of Individualization might be:  \"Parent requests to be called daily at 9am for status\", \"I have a hard time hearing out of my right ear\", or \"Do not touch me to wake me up as it startles me\".  Outcome: Adequate for Care Transition     Problem: Plan of Care - These are the overarching goals to be used throughout the patient stay.    Goal: Plan of Care Review  Description: The Plan of Care Review/Shift note should be completed every shift.  The Outcome Evaluation is a brief statement about your assessment that the patient is improving, declining, or no change.  This information will be displayed automatically on your shift note.  Outcome: Adequate for Care Transition  Flowsheets (Taken 7/30/2023 1707)  Plan of Care Reviewed With: patient   Goal Outcome Evaluation:      Plan of Care Reviewed With: patient        RN discussed discharge instruction to patient and spouse, verbalized understanding, questions answered, prescription medication handed to patient, left facility at 1715.         "

## 2023-07-30 NOTE — PLAN OF CARE
Goal Outcome Evaluation:    AO x4. Denies pain. B, 266. No BM this shift. SBA with walker. Plan: pending biopsy results, if Hgb stable possible discharge home with outpt follow up for biopsy result per colorectal surgery.

## 2023-07-31 NOTE — TELEPHONE ENCOUNTER
I spoke with patient and he states that he is doing well.  Follow-up appointment scheduled on 08/03 at 1255 with Dr. Li.  Patient is able to make this appointment.  Patient states that his FBS was 294 this am  He has resumed taking the Lantus 10 units, and is using the Novolog sliding scale insulin per coverage directions.  He will check his blood sugar again prior to lunch.  He has also resumed the Trulicity.  He didn't have his blood sugar log near him at the time of the call, but will bring this to the appointment on Thursday.    I advised him to notify us if blood sugars remain elevated in spite of prescribed treatments.    Patient is currently holding the Plavix per direction and is taking the Aspirin.     Has not noted any rectal bleeding since discharge.   Will notify the clinic if this occurs.  No further questions.  Will call back if any other questions or concerns.   QUIRINO Acevedo RN

## 2023-07-31 NOTE — PROGRESS NOTES
Miriam Badillo, DO  You35 minutes ago (1:30 PM)     JL  Yes, we should to make sure Hb is stable: CBC, CMP, CEA level please. Can also check type and screen in the event he needs blood.    Thank you     Writer spoke to Benji about recommendations from Dr. Badillo. He is agreeable and verbalized understanding.     Letty Arevalo RN on 7/31/2023 at 2:19 PM

## 2023-07-31 NOTE — TELEPHONE ENCOUNTER
Per discharge note of 07/30-  Acute on chronic blood loss anemia secondary to GI bleed from colon mass in setting of being on Plavix  Colonoscopy on 7/28: Colonic mass with adherent clot found in transverse colon suspicious for cancer, biopsy pending.  Needed 1 unit of PRBC, hemoglobin is stable at around 8 now.  Colorectal surgery consult appreciated, plan to follow-up on biopsy results and outpatient and will need outpatient CT for staging if biopsy is consistent with malignancy.  Advised patient to follow-up with PCP and get CBC within 1 week.     CAD  Patient had PCI with drug-eluting stents x4 in November 2022.  He was given dual antiplatelet therapy for 6 months and plan was to continue lifelong Plavix.  Due to GI bleed will hold Plavix and discharge him on aspirin.  If he starts bleeding again on aspirin, will have to hold it again.  Once the patient is done with his colon surgery, he may go back on Plavix.     Diabetes mellitus type 2, uncontrolled.  Prior to admission on dulaglutide but as per the patient his blood sugars at home are 2-3 100s.  Dulaglutide was on hold while inpatient and was started on Lantus and sliding scale but was still hyperglycemic due to being on steroids.  Resume dulaglutide upon discharge and will add Lantus and sliding scale.    I am holding an appointment on 08/03 at 1255 with Dr. Roverto Li/Dr. Kauffman.  I called patient but he is in the shower, she asked me to call back prior to 1 pm.  QUIRINO Acevedo RN

## 2023-07-31 NOTE — PROGRESS NOTES
"[facility]: Post-Discharge Note  SITUATION                                                      Admission:    Admission Date: 07/26/23    Discharge:   Discharge Date: 07/30/23  Discharge Diagnosis: Acute on chronic blood loss anemia.  Newly diagnosed colonic mass, suspicious for cancer.  Biopsy done and pathology pending.    BACKGROUND                                                      Per hospital discharge summary and inpatient provider notes.    Hospital Course  83 year old male with PMH of HTN, hyperlipidemia, inflammatory monoarthritis, chronic lymphocytic leukemia, abdominal aortic aneurysm, anemia, chronic kidney disease, atrial fibrillation, diabetes, CAD, thrombocytopenia, idiopathic pericarditis who presents to ED from primary care's office for evaluation of abnormal labs and pain in multiple joints.      He had hemoglobin of 7.7 and was noted to have blood in his stool.  He received a unit of blood and underwent colonoscopy, suspicious for colon cancer.    ASSESSMENT      Discharge Assessment  How are you doing now that you are home?: \"I'm feeling slow, but fine!\"  How are your symptoms? (Red Flag symptoms escalate to triage hotline per guidelines): Improved  Do you feel your condition is stable enough to be safe at home until your provider visit?: Yes (Insulin)  Does the patient have their discharge instructions? : Yes  Does the patient have questions regarding their discharge instructions? : No  Were you started on any new medications or were there changes to any of your previous medications? : Yes (81 mg asprin, prednisone, and insulin)  Does the patient have all of their medications?: Yes  Do you have questions regarding any of your medications? : No  Do you have all of your needed medical supplies or equipment (DME)?  (i.e. oxygen tank, CPAP, cane, etc.):  (NA)  Discharge follow-up appointment scheduled within 14 calendar days? : Yes  Discharge Follow Up Appointment Date: 08/03/23  Discharge " Follow Up Appointment Scheduled with?: Primary Care Provider    PLAN                                                      Outpatient Plan: Pathology pending. Benji has a hospital follow up scheduled on 08/03/23 and a follow up with Dr. Badillo on 08/17/23. Writer will talk to Dr. Badillo to see if any additional lab monitoring is needed prior to follow up.     Future Appointments   Date Time Provider Department Center   8/3/2023  1:10 PM Roverto Li MD [facility]   8/15/2023  9:00 AM [facility] [place] [facility]   8/17/2023 12:30 PM [facility]   8/23/2023 11:45 AM Juwan Bah MD [facility]         For any urgent concerns, please contact our 24 hour clinic line:   [place]: [phone]       Letty Arevalo RN

## 2023-07-31 NOTE — PROGRESS NOTES
"Clinic Care Coordination Contact  [facility]: Post-Discharge Note  SITUATION                                                      Admission:    Admission Date: 07/26/23   Reason for Admission: Shortness of breath  Discharge:   Discharge Date: 07/30/23  Discharge Diagnosis: Shortness of breath    BACKGROUND                                                      Per hospital discharge summary and inpatient provider notes:    Austin Garza is a 82 year old male with PMH of HTN, hyperlipidemia, inflammatory monoarthritis, chronic lymphocytic leukemia, abdominal aortic aneurysm, anemia, chronic kidney disease, atrial fibrillation, diabetes, CAD, thrombocytopenia, idiopathic pericarditis who presents to ED from primary care's office for evaluation of abnormal labs and pain in multiple joints.   ASSESSMENT           Discharge Assessment  How are you doing now that you are home?: \" Doing quite well thank you for calling \"  How are your symptoms? (Red Flag symptoms escalate to triage hotline per guidelines): Improved  Do you feel your condition is stable enough to be safe at home until your provider visit?: Yes  Does the patient have their discharge instructions? : Yes  Does the patient have questions regarding their discharge instructions? : No  Were you started on any new medications or were there changes to any of your previous medications? : Yes  Does the patient have all of their medications?: Yes  Do you have questions regarding any of your medications? : No  Do you have all of your needed medical supplies or equipment (DME)?  (i.e. oxygen tank, CPAP, cane, etc.): Yes  Discharge follow-up appointment scheduled within 14 calendar days? : Yes  Discharge Follow Up Appointment Date: 08/15/23  Discharge Follow Up Appointment Scheduled with?: Specialty Care Provider    Post-op ([facility] Only)  If the patient had a surgery or procedure, do they have any questions for a nurse?: No           PLAN                          "                             Outpatient Plan:  Your activity upon discharge: activity as tolerated  Follow this diet upon discharge: Orders Placed This Encounter  Regular Diet Adult  Follow-up and recommended labs and tests  Follow up with primary care provider, Luma Kauffman, within 5 days for  hospital follow- up. The following labs/tests are recommended: CBC  and BMP.  Follow-up with rheumatology outpatient for diffuse joint pain and  elevated rheumatoid factor.  Please call and schedule a follow-up with colorectal surgery if you do  not hear back from them within 1 week.    Future Appointments   Date Time Provider Department Center   8/15/2023  9:00 AM  [facility]   8/17/2023 12:30 PM Miriam Badillo, DO [facility]   8/23/2023 11:45 AM Juwan Bah MD [facility]         For any urgent concerns, please contact our 24 hour nurse triage line: [phone] ([phone])         Joellen Vang MA

## 2023-08-01 NOTE — RESULT ENCOUNTER NOTE
January Leblanc ,    The results from the lab work we did in the clinic before you were advised to go to the hospital does have some abnormalities to address.  One of the Rheumatoid numbers is elevated.  Please be sure to address these labs at your upcoming scheduled post hospital appointment.      Thank you for choosing [facility] for your health care needs,      Annette Restrepo PA-C

## 2023-08-03 NOTE — PROGRESS NOTES
"Assessment & Plan     (C18.4) Malignant neoplasm of transverse colon (H)  (primary encounter diagnosis)  Comment: Patient with newly diagnosed adenocarcinoma of the colon, having continued black stools  Plan: Ferritin, Iron and iron binding capacity  - follow up with colorectal surgery and oncology        (D64.9) Anemia, unspecified type  Comment: likely secondary to chronic GI bleed  Plan: Ferritin, Iron and iron binding capacity    (C91.10) CLL (chronic lymphocytic leukemia) (H)  Comment: Not on treatment at this time  Plan: oncology follow up for colon cancer    (E11.9,  Z79.4) Type 2 diabetes mellitus without complication, with long-term current use of insulin (H)  Comment: recently got insulin added on, is on prednisone which is worsening his hyperglycemia  Plan: blood glucose (NO BRAND SPECIFIED) test strip,         blood glucose (NO BRAND SPECIFIED) lancets         standard               MED REC REQUIRED  Post Medication Reconciliation Status:  Discharge medications reconciled, continue medications without change  BMI:   Estimated body mass index is 28.66 kg/m  as calculated from the following:    Height as of 7/26/23: 1.753 m (5' 9\").    Weight as of this encounter: 88 kg (194 lb 1.6 oz).   - discussed exercise and diet        Roverto Li MD, PhD  [facility] CYRUS Leblanc is a 83 year old, presenting for the following health issues:  Hospital F/U        8/3/2023     1:01 PM   Additional Questions   Roomed by Britta Lim       [facility]         7/31/2023    12:32 PM   Post Discharge Outreach   Admission Date 7/26/2023   Reason for Admission anemia   Discharge Date 7/30/2023   Discharge Diagnosis Acute on chronic blood loss anemia.  Newly diagnosed colonic mass, suspicious for cancer.  Biopsy done and pathology pending.   How are you doing now that you are home? \"I'm feeling slow, but fine!\"   How are your symptoms? (Red Flag symptoms escalate to triage hotline per " guidelines) Improved   Do you feel your condition is stable enough to be safe at home until your provider visit? Yes   Does the patient have their discharge instructions?  Yes   Does the patient have questions regarding their discharge instructions?  No   Were you started on any new medications or were there changes to any of your previous medications?  Yes   Does the patient have all of their medications? Yes   Do you have questions regarding any of your medications?  No   Discharge follow-up appointment scheduled within 14 calendar days?  Yes   Discharge Follow Up Appointment Date 8/3/2023   Discharge Follow Up Appointment Scheduled with? Primary Care Provider     Hospital Follow-up Visit:    Hospital/Nursing Home/IP Rehab Facility: [facility]  Date of Admission: 7/26/23  Date of Discharge: 7/30/23  Reason(s) for Admission: acute blood loss anemia, colon cancer    Was your hospitalization related to COVID-19? No   Problems taking medications regularly:  None  Medication changes since discharge: None  Problems adhering to non-medication therapy:  None    Summary of hospitalization:  [facility] discharge summary reviewed  Diagnostic Tests/Treatments reviewed.  Follow up needed: labs pending  Other Healthcare Providers Involved in Patient s Care:         Specialist appointment - oncology and Surgical follow-up appointment - colorectal surgery  Update since discharge: worsened.       83 year old male with PMH of CLL, CKD3, HTN, AAA, inflammatory arthritis, HLD, DM2, thrombocytopenia, anemia and recent diagnosis of adenocarcinoma of the colon who presents post hospitalization. He got a colonoscopy on 2/8/22 and a large polyp was noted and tatooed, but not removed, he was supposed to get this removed at follow up however he had cardiac issues that required stent placement, he was placed on DAPT. Since recovering from this he has had continued black stools, worsening weight loss and  increased fatigue. Then he presented to the ED on 7/26 with worsening fatigue, inflammatory arthritis and found to be anemic. Colonoscopy was performed given concerns for GI blood loss and he was found to have a large colonic mass as well as adherent clots likely the source of his bleeding and a biopsy was taken from the colonic mass and subsequently found to have moderately differentiated invasive adenocarcinoma, his plavix was stopped but he was discharged on aspirin. He was discharged from the hospital on 7/30. He was discharged on prednisone with follow up with rheumatology for his inflammatory polyarthritis.    Since going home he is feeling more tired, dizzy since coming home from hospital, he has continued weight loss down to 192 lbs as of this morning, highest his weight was 250 lbs. His joint pain is gone. In terms of his joint pain it was all over different joints but never all at once. He stopped smoking in the 1970s. He drinks cocktails on the weekends. Eating salads for dinner. He hasn't exercised for two weeks but before that he was getting 30-35 min 2 - 3 times per week.    He has follow up with oncology and colorectal surgery coming up.      Plan of care communicated with patient                   Objective    /77   Pulse 96   Temp 98  F (36.7  C) (Tympanic)   Resp 16   Wt 88 kg (194 lb 1.6 oz)   SpO2 100%   BMI 28.66 kg/m    Body mass index is 28.66 kg/m .  Physical Exam   GENERAL: healthy, alert and no distress  EYES: Eyes grossly normal to inspection, PERRL and conjunctivae and sclerae normal  HENT: nose and mouth without ulcers or lesions  NECK: no adenopathy, no asymmetry, masses, or scars  RESP: lungs clear to auscultation - no rales, rhonchi or wheezes  CV: regular rate and rhythm, normal S1 S2, no murmur, click or rub, no peripheral edema  ABDOMEN: soft, nontender, no hepatosplenomegaly, no masses and bowel sounds normal  MS: no gross musculoskeletal defects noted, no edema  SKIN:  no suspicious lesions or rashes  NEURO: Normal strength and tone, mentation intact and speech normal  PSYCH: mentation appears normal, affect normal/bright

## 2023-08-03 NOTE — TELEPHONE ENCOUNTER
Patient sent My Chart message that he has run out of the Contour Next test strips.  Patient has now begun sliding scale insulin in addition to Lantus due to elevated blood sugars.  RX filled per order of Dr. Kauffman.  Patient notified thru My Chart.  QUIRINO Acevedo RN

## 2023-08-07 NOTE — PROGRESS NOTES
Medical Assistant Note:  Austin Garza presents today for blood draw.    Patient seen by provider today: No.   present during visit today: Not Applicable.    Concerns: No Concerns.    Procedure:  Lab draw site: right antecub, Needle type: butterfly, Gauge: 23.    Post Assessment:  Labs drawn without difficulty: Yes.    Discharge Plan:  Departure Mode: Ambulatory.    Face to Face Time: 10.    Gail Ley

## 2023-08-15 NOTE — PROGRESS NOTES
Medical Assistant Note:  Austin CHEEMA Mattmiguel ángelbruno presents today for blood draw.    Patient seen by provider today: No.   present during visit today: Not Applicable.    Concerns: No Concerns.    Procedure:  Lab draw site: right antecub, Needle type: bbutterfly, Gauge: 23.    Post Assessment:  Labs drawn without difficulty: Yes.    Discharge Plan:  Departure Mode: Ambulatory.    Face to Face Time: 10.    Geetha Benítez, CMA

## 2023-08-17 PROBLEM — D50.0 IRON DEFICIENCY ANEMIA DUE TO CHRONIC BLOOD LOSS: Status: ACTIVE | Noted: 2023-01-01

## 2023-08-17 NOTE — LETTER
8/17/2023         RE: Austin Garza  [address] [place] [address]        Dear Colleague,    Thank you for referring your patient, Austin Garza, to the [facility]. Please see a copy of my visit note below.    [facility]    Hematology/Oncology Established Patient Note      Today's Date: 8/17/23    Reason for Follow-up: CLL      HISTORY OF PRESENT ILLNESS: Austin Garza is an 83 year old male with CLL previously on ibrutinib and followed by Dr. Perry who has since relocated.     He has PMHx of DMII, HTN who presented with leukocytosis.  In November 2017, he was found to have elevated WBC of 61.7K, hemoglobin of 10.4, and platelet of 115K.  There were no other CBC results available between 2010 and 2017.  Prior to 2010, he had normal CBC, with normal to mild anemia, and mild thrombocytopenia.   He was asymptomatic.    He underwent bone marrow biopsy on 11/21/17, which was consistent with chronic lymphocytic leukemia/small lymphocytic lymphoma, with 13% ringed sideroblasts identified.  The presence of increased numbers of ringed sideroblasts raises the possibility of an associated myelodysplastic syndrome.  Dysplastic changes are not identified though.  Many cases exhibiting ringed sideroblasts are metabolic origin, without significant risk of progression to acute leukemia, and these typically do not show dysplastic morphology as is the case with this specimen.  15% ringed sideroblasts are required for a diagnosis for RARS, which this specimen does not meet.  Flow cytometry is also consistent with CLL/SLL.  Cytogenetics show two (10%) of the metaphase cells comprise a clone characterized by a deletion within the proximal long arm of a chromosome 13 as the sole karyotypic abnormality.  Three (15%) metaphases had loss of the Y chromosome as the sole abnormality.    CT scan on 11/29/17 shows mildly enlarged left axillary lymph node and  periaortic lymph nodes in the retroperitoneum of the abdomen.  Spleen is also enlarged.    On his staging CT scan for CLL, he was incidentally found to have a large infrarenal abdominal aortic aneurysm, measuring 7.6 cm.  He was seen by Dr. Thomas, and he underwent EVAR on 12/4/17.     He started ibrutinib on 5/4/20.  It was held 5/28/20-6/4/20 for tooth extraction.    He was hospitalized on 7/30/2021 for shortness of breath, new CHF, A. fib with RVR and ibrutinib was put on hold.  He was seen by cardiology and eventually underwent cardioversion for the A. fib.  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  He was started on Eliquis and metoprolol.  His Plavix was stopped.    He went to the [facility] ED on 9/26/21 and found to have pericardial effusion.  Due to lack of beds, he was transferred to [facility] for acute pericardial effusion and pericarditis.  He was started on colchicine and prednisone taper x 6 weeks.  He was also given Bactrim for PJP prophylaxis due duration of prednisone course.  He was discharged on 10/1/21.      Since his last visit in clinic, he states he has had multiple hospitalizations for back pain. He informs me he has cardiology follow up as he was discovered to have coronary artery disease. He has had angiogram. There are recommendations in regards to CABG vs PCI. He states he is uncertain if he will move forward with this. He will be seen in 11/3/22.      INTERIM HISTORY:   Patient was hospitalized for acute blood loss anemia in the setting of GIB. He underwent colonoscopy on 7/28 with path returning as colonic mass with adherent clot in the transverse colon, which returned positive for adenocarcinoma. He had CT CAP done yesterday at [facility]. He is to see CRC surgery next week.       REVIEW OF SYSTEMS:   14 point ROS was reviewed and is negative other than as noted above in HPI.       HOME MEDICATIONS:  Current Outpatient Medications   Medication Sig Dispense Refill  "    Alcohol Swabs (ALCOHOL PREP) 70 % PADS        aspirin 81 MG EC tablet Take 1 tablet (81 mg) by mouth daily 30 tablet 1     blood glucose (NO BRAND SPECIFIED) lancets standard Use to test blood sugar 3 times daily or as directed. 100 each 3     blood glucose (NO BRAND SPECIFIED) test strip Contour Next Test Strips-Use to test blood sugar 3 times daily or as directed. 300 strip 1     Dulaglutide 4.5 MG/0.5ML SOPN Inject 4.5 mg Subcutaneous once a week 6 mL 4     insulin aspart (NOVOLOG PEN) 100 UNIT/ML pen Inject 1-7 Units Subcutaneous 3 times daily (before meals) Do Not give Correction Insulin if Pre-Meal BG less than 140. For Pre-Meal  - 189 give 1 unit. For Pre-Meal  - 239 give 2 units. For Pre-Meal  - 289 give 3 units. For Pre-Meal  - 339 give 4 units. For Pre-Meal - 399 give 5 units. For Pre-Meal -449 give 6 units For Pre-Meal BG greater than or equal to 450 give 7 units. 15 mL 1     insulin glargine (LANTUS PEN) 100 UNIT/ML pen Inject 10 Units Subcutaneous every morning (before breakfast) 15 mL 0     metoprolol succinate ER (TOPROL XL) 100 MG 24 hr tablet Take 1 tablet (100 mg) by mouth daily 90 tablet 4     nitroGLYcerin (NITROSTAT) 0.4 MG sublingual tablet One tablet under the tongue every 5 minutes if needed for chest pain. May repeat every 5 minutes for a maximum of 3 doses in 15 minutes\" 25 tablet 3     pantoprazole (PROTONIX) 40 MG EC tablet Take 1 tablet (40 mg) by mouth daily 90 tablet 4     predniSONE (DELTASONE) 10 MG tablet Take 1 tablet (10 mg) by mouth daily 30 tablet 0     rosuvastatin (CRESTOR) 20 MG tablet Take 1 tablet (20 mg) by mouth daily 90 tablet 4         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      CLL (chronic lymphocytic leukemia)      Diaphragmatic " hernia      Diverticulitis of colon      Esophageal reflux      Essential hypertension      Hyperlipidemia      Irritable bowel syndrome      Macular degeneration (senile) of retina      Mumps      Squamous Cell Carcinoma, Back      Type 2 diabetes mellitus          PAST SURGICAL HISTORY:  Past Surgical History:   Procedure Laterality Date     ANESTHESIA CARDIOVERSION N/A 2021    Procedure: ANESTHESIA, FOR CARDIOVERSION;  Surgeon: GENERIC ANESTHESIA PROVIDER;  Location: [facility] OR     APPENDECTOMY OPEN  1966     BONE MARROW BIOPSY, BONE SPECIMEN, NEEDLE/TROCAR N/A 2017    Procedure: BIOPSY BONE MARROW;  BONE MARROW BIOPSY;  Surgeon: Shady Garcia MD;  Location:  GI     COLONOSCOPY       COLONOSCOPY N/A 2023    Procedure: COLONOSCOPY;  Surgeon: Julio Castro MD;  Location: [facility] OR     CV CORONARY ANGIOGRAM N/A 2022    Procedure: Coronary Angiogram;  Surgeon: Evaristo Beaulieu MD;  Location:  HEART CARDIAC CATH LAB     ENDOVASCULAR REPAIR ANEURYSM ABDOMINAL AORTA N/A 2017    Procedure: ENDOVASCULAR REPAIR ANEURYSM ABDOMINAL AORTA;  ENDOVASCULAR REPAIR ANEURYSM ABDOMINAL AORTA;  Surgeon: Milton Cazares MD;  Location:  OR     Fistulotomy with marsupialization for repair of fisture in ano       IR ABDOMINAL AORTOGRAM  3/11/2019     IR VISCERAL EMBOLIZATION  2019     Multiple skin tags - resection  1998     Squamous cell skin cancer resection - back       VASECTOMY           SOCIAL HISTORY:  Social History     Socioeconomic History     Marital status:      Spouse name: librado     Number of children: 0     Years of education: 17     Highest education level: Not on file   Occupational History     Occupation: retired   Tobacco Use     Smoking status: Former     Packs/day: 0.50     Years: 20.00     Pack years: 10.00     Types: Cigarettes     Quit date: 1975     Years since quittin.6     Smokeless tobacco: Former   Substance and Sexual  "Activity     Alcohol use: Yes     Alcohol/week: 10.0 - 14.0 standard drinks of alcohol     Types: 10 - 14 Standard drinks or equivalent per week     Comment: 3 drinks a day/wine     Drug use: No     Sexual activity: Never   Other Topics Concern     Parent/sibling w/ CABG, MI or angioplasty before 65F 55M? Not Asked   Social History Narrative     Not on file     Social Determinants of Health     Financial Resource Strain: Not on file   Food Insecurity: Not on file   Transportation Needs: Not on file   Physical Activity: Not on file   Stress: Not on file   Social Connections: Not on file   Intimate Partner Violence: Not At Risk (2023)    Humiliation, Afraid, Rape, and Kick questionnaire      Fear of Current or Ex-Partner: No      Emotionally Abused: No      Physically Abused: No      Sexually Abused: No   Housing Stability: Not on file   He quit smoking in .  He drinks 1-3 glasses of wine a night with dinner.  He is retired, and used to work as a .  He lives with his wife in [place].  His cousin had lung cancer and  around age 69.  Otherwise, he denies family history of cancer.        FAMILY HISTORY:  Family History   Problem Relation Age of Onset     Cerebrovascular Disease Father         x 2     Hypertension Mother      C.A.D. Mother      Cancer Mother         skin     Eye Disorder Mother         glaucoma     Prostate Cancer No family hx of      Cancer - colorectal No family hx of      Glaucoma No family hx of      Macular Degeneration No family hx of          PHYSICAL EXAM:  BP (!) 147/74   Pulse 78   Temp 97.9  F (36.6  C) (Oral)   Resp 16   Ht 1.753 m (5' 9\")   Wt 87.1 kg (192 lb)   SpO2 99%   BMI 28.35 kg/m    ECO  HEENT: Large bandage of the upper lip. He has ecchymosis of the oral labial fold into chin and upper cervical area. He has tenderness of the sinus.   GENERAL/CONSTITUTIONAL: No acute distress.  EYES: No scleral icterus.  NEUROLOGIC: Alert, oriented, answers questions " appropriately.  INTEGUMENTARY: No jaundice.    GAIT: Steady, does not use assistive device      LABS:   Latest Reference Range & Units 08/15/23 09:05   Sodium 136 - 145 mmol/L 135 (L)   Potassium 3.4 - 5.3 mmol/L 4.6   Chloride 98 - 107 mmol/L 100   Carbon Dioxide (CO2) 22 - 29 mmol/L 26   Urea Nitrogen 8.0 - 23.0 mg/dL 27.2 (H)   Creatinine 0.67 - 1.17 mg/dL 1.46 (H)   GFR Estimate >60 mL/min/1.73m2 48 (L)   Calcium 8.8 - 10.2 mg/dL 8.7 (L)   Anion Gap 7 - 15 mmol/L 9   Albumin 3.5 - 5.2 g/dL 3.9   Protein Total 6.4 - 8.3 g/dL 6.2 (L)   Alkaline Phosphatase 40 - 129 U/L 66   ALT 0 - 70 U/L 46   AST 0 - 45 U/L 26   Bilirubin Total <=1.2 mg/dL 0.5   Glucose 70 - 99 mg/dL 206 (H)   Lactate Dehydrogenase 0 - 250 U/L 134   WBC 4.0 - 11.0 10e3/uL 13.8 (H)   Hemoglobin 13.3 - 17.7 g/dL 8.4 (L)   Hematocrit 40.0 - 53.0 % 27.3 (L)   Platelet Count 150 - 450 10e3/uL 117 (L)   RBC Count 4.40 - 5.90 10e6/uL 3.20 (L)   MCV 78 - 100 fL 85   MCH 26.5 - 33.0 pg 26.3 (L)   MCHC 31.5 - 36.5 g/dL 30.8 (L)   RDW 10.0 - 15.0 % 17.2 (H)   % Neutrophils % 27   % Lymphocytes % 67   % Monocytes % 4   % Eosinophils % 1   % Basophils % 0   Absolute Basophils 0.0 - 0.2 10e3/uL 0.0   Absolute Eosinophils 0.0 - 0.7 10e3/uL 0.1   Absolute Immature Granulocytes <=0.4 10e3/uL 0.1   Absolute Lymphocytes 0.8 - 5.3 10e3/uL 9.4 (H)   Absolute Monocytes 0.0 - 1.3 10e3/uL 0.5   % Immature Granulocytes % 1   Absolute Neutrophils 1.6 - 8.3 10e3/uL 3.7   Absolute NRBCs 10e3/uL 0.0   NRBCs per 100 WBC <1 /100 0   RBC Morphology  Confirmed RBC Indices   Platelet Morphology Automated Count Confirmed. Platelet morphology is normal.  Automated Count Confirmed. Platelet morphology is normal.      Latest Reference Range & Units 08/03/23 14:13   CEA ng/mL 18.9   Ferritin 31 - 409 ng/mL 105   Iron 61 - 157 ug/dL 34 (L)   Iron Binding Capacity 240 - 430 ug/dL 328   Iron Sat Index 15 - 46 % 10 (L)   Lactate Dehydrogenase 0 - 250 U/L 193     PATHOLOGY:       Component  Ref Range & Units    Case Report   Surgical Pathology Report                         Case: [id number]                                   Authorizing Provider:  Julio Castro MD Collected:           07/28/2023 02:40 PM           Ordering Location:     [facility]   Received:            07/28/2023 03:03 PM                                  Main OR                                                                       Pathologist:           Itzel Bains MD PhD                                                       Specimen:    Large Intestine, Colon, Transverse, Transverse colon mass                                  Final Diagnosis   A(1). Transverse colon mass, biopsy:  -Invasive moderately differentiated adenocarcinoma (see comment)      Addendum electronically signed by Itzel Bains MD PhD on 8/2/2023 at  7:50 AM  Electronically signed by Itzel Bains MD PhD on 7/31/2023 at  9:52 AM   Comment     Immunohistochemical studies for MMR proteins have been requested and will be reported as an addendum when available   Synoptic Checklist   Colon and Rectum Biomarker Reporting Template   Protocol posted: 6/30/2021(Added in Addendum) COLON AND RECTUM: BIOMARKER REPORTING TEMPLATE - All Specimens  RESULTS   Mismatch Repair     Immunohistochemistry (IHC) Testing for Mismatch Repair (MMR) Proteins     MLH1 Result  Intact nuclear expression   Immunohistochemistry (IHC) Testing for Mismatch Repair (MMR) Proteins     MSH2 Result  Intact nuclear expression   Immunohistochemistry (IHC) Testing for Mismatch Repair (MMR) Proteins     MSH6 Result  Intact nuclear expression   Immunohistochemistry (IHC) Testing for Mismatch Repair (MMR) Proteins     PMS2 Result  Intact nuclear expression   Immunohistochemistry (IHC) Testing for Mismatch Repair (MMR) Proteins  Background nonneoplastic tissue / internal control with intact nuclear expression   IHC Interpretation  No loss of nuclear expression of MMR proteins:  low probability of MSI-H             ASSESSMENT/PLAN:  Austin Garza is an 83 year old male with:    1) Recent diagnosis of adenocarcinoma of the transverse colon, SAMUEL  -Colonoscopy 7/26/23 with non-obstructing large mass in the transverse colon involving 1/3 of the lumen conference, measuring 5 cm in diameter.  -CT CAP done yesterday at an outside institution and will request this.  -He is to meet with surgery next week.  -Discussed if early stage, he will review his candidacy for surgery with CRC. If not a candidate or with more advanced disease, will need to discuss systemic treatment.   -CEA 18.9 upon diagnosis.   -On 8/3/23, ferritin 105, iron 34, TIBC 328, Iron sat 10%. 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 Will write for venofer 300 mg x3.  -Will transfuse for Hb </=8 given and PLT </= 50K given his significant cardiac history and need for antiplatelet therapy.    2) CLL/SLL: BLACKWOOD stage III, with lymphocytosis, lymphadenopathy, splenomegaly, and anemia.  He has mild thrombocytopenia as well, but is above 100K.    -He presented with leukocytosis with WBC of 61.7K, hemoglobin of 10.4, and platelet count of 115K on diagnosis.  Bone marrow biopsy and flow cytometry confirmed CLL/SLL.  CT scan shows mildly enlarged left axillary lymph node and periaortic lymph nodes in the retroperitoneum of the abdomen, with enlarged spleen.    -Due to worsening lymphocyte count, anemia, thrombocytopenia, as well as fatigue and night sweats, he started ibrutinib on 5/4/20.    -In light of his recent issues with atrial fibrillation and now on Eliquis, his ibrutinib has been on hold since August 2021.  Considering his CLL is under good control, his WBC is actually on the lower end of normal, and with risk of atrial fibrillation and bleeding on anticoagulants, we will continue to hold ibrutinib for now and monitor his counts. He has persistent thrombocytopenia. He will likely need to resume  treatment at some point, but we can follow the counts and we could also consider other treatments as well for CLL.  -Patient's thrombocytopenia has improved (>100K since June 2023). WBCs are ranging 13-14, ALC 8-9%. Will continue to monitor.    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 He completed radiation at [facility] with planned 60 Gy x 30 fractions. He has completed radiation and noted that he did not need any more follow-up for this.    3) Abdominal aortic aneurysm: measures up to 7.6 cm on CT scan, incidentally found.  He underwent EVAR on 12/4/17.  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  He is on Plavix now.  On 5/13/19, he underwent and percutaneous aneurysm sac puncture and endo leak embolization by IR on 5/13/19.  -Follow-up with vascular surgery and IR.    4) Diabetes, hypertension:    -Following with PCP.    5) Atrial fibrillation with RVR s/p successful DCCV to SR:  -On metoprolol. He is off eliquis.  -Following with cardiology.    6) CAD:   -Follow-up with cardiology  -There are continued discussions in regards to PCI/CABG. He is uncertain if he is following with [place] or locally.  -Plavix is currently held per hospital discharge. He is on aspirin 81 mg daily.    7) -Schedule venofer 300 mg weekly x3.   -Return to clinic in 3 weeks to review the above.       Miriam Badillo DO  Hematology/Oncology  [facility]      Again, thank you for allowing me to participate in the care of your patient.        Sincerely,        Miriam Badillo DO

## 2023-08-17 NOTE — PROGRESS NOTES
[facility]    Hematology/Oncology Established Patient Note      Today's Date: 8/17/23    Reason for Follow-up: CLL      HISTORY OF PRESENT ILLNESS: Austin Garza is an 83 year old male with CLL previously on ibrutinib and followed by Dr. Perry who has since relocated.     He has PMHx of DMII, HTN who presented with leukocytosis.  In November 2017, he was found to have elevated WBC of 61.7K, hemoglobin of 10.4, and platelet of 115K.  There were no other CBC results available between 2010 and 2017.  Prior to 2010, he had normal CBC, with normal to mild anemia, and mild thrombocytopenia.   He was asymptomatic.    He underwent bone marrow biopsy on 11/21/17, which was consistent with chronic lymphocytic leukemia/small lymphocytic lymphoma, with 13% ringed sideroblasts identified.  The presence of increased numbers of ringed sideroblasts raises the possibility of an associated myelodysplastic syndrome.  Dysplastic changes are not identified though.  Many cases exhibiting ringed sideroblasts are metabolic origin, without significant risk of progression to acute leukemia, and these typically do not show dysplastic morphology as is the case with this specimen.  15% ringed sideroblasts are required for a diagnosis for RARS, which this specimen does not meet.  Flow cytometry is also consistent with CLL/SLL.  Cytogenetics show two (10%) of the metaphase cells comprise a clone characterized by a deletion within the proximal long arm of a chromosome 13 as the sole karyotypic abnormality.  Three (15%) metaphases had loss of the Y chromosome as the sole abnormality.    CT scan on 11/29/17 shows mildly enlarged left axillary lymph node and periaortic lymph nodes in the retroperitoneum of the abdomen.  Spleen is also enlarged.    On his staging CT scan for CLL, he was incidentally found to have a large infrarenal abdominal aortic aneurysm, measuring 7.6 cm.  He was seen by Dr. Thomas, and he underwent EVAR  on 12/4/17.     He started ibrutinib on 5/4/20.  It was held 5/28/20-6/4/20 for tooth extraction.    He was hospitalized on 7/30/2021 for shortness of breath, new CHF, A. fib with RVR and ibrutinib was put on hold.  He was seen by cardiology and eventually underwent cardioversion for the A. fib.  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  He was started on Eliquis and metoprolol.  His Plavix was stopped.    He went to the [facility] ED on 9/26/21 and found to have pericardial effusion.  Due to lack of beds, he was transferred to [facility] for acute pericardial effusion and pericarditis.  He was started on colchicine and prednisone taper x 6 weeks.  He was also given Bactrim for PJP prophylaxis due duration of prednisone course.  He was discharged on 10/1/21.      Since his last visit in clinic, he states he has had multiple hospitalizations for back pain. He informs me he has cardiology follow up as he was discovered to have coronary artery disease. He has had angiogram. There are recommendations in regards to CABG vs PCI. He states he is uncertain if he will move forward with this. He will be seen in 11/3/22.      INTERIM HISTORY:   Patient was hospitalized for acute blood loss anemia in the setting of GIB. He underwent colonoscopy on 7/28 with path returning as colonic mass with adherent clot in the transverse colon, which returned positive for adenocarcinoma. He had CT CAP [facility] yesterday at [facility]. He is to see CRC surgery next week.       REVIEW OF SYSTEMS:   14 point ROS was reviewed and is negative other than as noted above in HPI.       HOME MEDICATIONS:  Current Outpatient Medications   Medication Sig Dispense Refill    Alcohol Swabs (ALCOHOL PREP) 70 % PADS       aspirin 81 MG EC tablet Take 1 tablet (81 mg) by mouth daily 30 tablet 1    blood glucose (NO BRAND SPECIFIED) lancets standard Use to test blood sugar 3 times daily or as directed. 100 each 3    blood glucose (NO BRAND  "SPECIFIED) test strip Contour Next Test Strips-Use to test blood sugar 3 times daily or as directed. 300 strip 1    Dulaglutide 4.5 MG/0.5ML SOPN Inject 4.5 mg Subcutaneous once a week 6 mL 4    insulin aspart (NOVOLOG PEN) 100 UNIT/ML pen Inject 1-7 Units Subcutaneous 3 times daily (before meals) Do Not give Correction Insulin if Pre-Meal BG less than 140. For Pre-Meal  - 189 give 1 unit. For Pre-Meal  - 239 give 2 units. For Pre-Meal  - 289 give 3 units. For Pre-Meal  - 339 give 4 units. For Pre-Meal - 399 give 5 units. For Pre-Meal -449 give 6 units For Pre-Meal BG greater than or equal to 450 give 7 units. 15 mL 1    insulin glargine (LANTUS PEN) 100 UNIT/ML pen Inject 10 Units Subcutaneous every morning (before breakfast) 15 mL 0    metoprolol succinate ER (TOPROL XL) 100 MG 24 hr tablet Take 1 tablet (100 mg) by mouth daily 90 tablet 4    nitroGLYcerin (NITROSTAT) 0.4 MG sublingual tablet One tablet under the tongue every 5 minutes if needed for chest pain. May repeat every 5 minutes for a maximum of 3 doses in 15 minutes\" 25 tablet 3    pantoprazole (PROTONIX) 40 MG EC tablet Take 1 tablet (40 mg) by mouth daily 90 tablet 4    predniSONE (DELTASONE) 10 MG tablet Take 1 tablet (10 mg) by mouth daily 30 tablet 0    rosuvastatin (CRESTOR) 20 MG tablet Take 1 tablet (20 mg) by mouth daily 90 tablet 4         ALLERGIES:  Allergies   Allergen Reactions    Ace Inhibitors Cough     cough  cough         PAST MEDICAL HISTORY:  Past Medical History:   Diagnosis Date    AAA (abdominal aortic aneurysm)     BCC (basal cell carcinoma of skin) - face     CLL (chronic lymphocytic leukemia)     Diaphragmatic hernia     Diverticulitis of colon     Esophageal reflux     Essential hypertension     Hyperlipidemia     Irritable bowel syndrome     Macular degeneration (senile) of retina     Mumps     Squamous Cell Carcinoma, Back 1988    Type 2 diabetes mellitus          PAST SURGICAL " HISTORY:  Past Surgical History:   Procedure Laterality Date    ANESTHESIA CARDIOVERSION N/A 2021    Procedure: ANESTHESIA, FOR CARDIOVERSION;  Surgeon: GENERIC ANESTHESIA PROVIDER;  Location: [facility] OR    APPENDECTOMY OPEN  1966    BONE MARROW BIOPSY, BONE SPECIMEN, NEEDLE/TROCAR N/A 2017    Procedure: BIOPSY BONE MARROW;  BONE MARROW BIOPSY;  Surgeon: Shady Garcia MD;  Location:  GI    COLONOSCOPY      COLONOSCOPY N/A 2023    Procedure: COLONOSCOPY;  Surgeon: Julio Castro MD;  Location: [facility] OR    CV CORONARY ANGIOGRAM N/A 2022    Procedure: Coronary Angiogram;  Surgeon: Evaristo Beaulieu MD;  Location:  HEART CARDIAC CATH LAB    ENDOVASCULAR REPAIR ANEURYSM ABDOMINAL AORTA N/A 2017    Procedure: ENDOVASCULAR REPAIR ANEURYSM ABDOMINAL AORTA;  ENDOVASCULAR REPAIR ANEURYSM ABDOMINAL AORTA;  Surgeon: Milton Cazares MD;  Location:  OR    Fistulotomy with marsupialization for repair of fisture in ano      IR ABDOMINAL AORTOGRAM  3/11/2019    IR VISCERAL EMBOLIZATION  2019    Multiple skin tags - resection  1998    Squamous cell skin cancer resection - back      VASECTOMY           SOCIAL HISTORY:  Social History     Socioeconomic History    Marital status:      Spouse name: librado    Number of children: 0    Years of education: 17    Highest education level: Not on file   Occupational History    Occupation: retired   Tobacco Use    Smoking status: Former     Packs/day: 0.50     Years: 20.00     Pack years: 10.00     Types: Cigarettes     Quit date: 1975     Years since quittin.6    Smokeless tobacco: Former   Substance and Sexual Activity    Alcohol use: Yes     Alcohol/week: 10.0 - 14.0 standard drinks of alcohol     Types: 10 - 14 Standard drinks or equivalent per week     Comment: 3 drinks a day/wine    Drug use: No    Sexual activity: Never   Other Topics Concern    Parent/sibling w/ CABG, MI or angioplasty before 65F 55M? Not  "Asked   Social History Narrative    Not on file     Social Determinants of Health     Financial Resource Strain: Not on file   Food Insecurity: Not on file   Transportation Needs: Not on file   Physical Activity: Not on file   Stress: Not on file   Social Connections: Not on file   Intimate Partner Violence: Not At Risk (2023)    Humiliation, Afraid, Rape, and Kick questionnaire     Fear of Current or Ex-Partner: No     Emotionally Abused: No     Physically Abused: No     Sexually Abused: No   Housing Stability: Not on file   He quit smoking in .  He drinks 1-3 glasses of wine a night with dinner.  He is retired, and used to work as a .  He lives with his wife in [place].  His cousin had lung cancer and  around age 69.  Otherwise, he denies family history of cancer.        FAMILY HISTORY:  Family History   Problem Relation Age of Onset    Cerebrovascular Disease Father         x 2    Hypertension Mother     C.A.D. Mother     Cancer Mother         skin    Eye Disorder Mother         glaucoma    Prostate Cancer No family hx of     Cancer - colorectal No family hx of     Glaucoma No family hx of     Macular Degeneration No family hx of          PHYSICAL EXAM:  BP (!) 147/74   Pulse 78   Temp 97.9  F (36.6  C) (Oral)   Resp 16   Ht 1.753 m (5' 9\")   Wt 87.1 kg (192 lb)   SpO2 99%   BMI 28.35 kg/m    ECO  HEENT: Large bandage of the upper lip. He has ecchymosis of the oral labial fold into chin and upper cervical area. He has tenderness of the sinus.   GENERAL/CONSTITUTIONAL: No acute distress.  EYES: No scleral icterus.  NEUROLOGIC: Alert, oriented, answers questions appropriately.  INTEGUMENTARY: No jaundice.    GAIT: Steady, does not use assistive device      LABS:   Latest Reference Range & Units 08/15/23 09:05   Sodium 136 - 145 mmol/L 135 (L)   Potassium 3.4 - 5.3 mmol/L 4.6   Chloride 98 - 107 mmol/L 100   Carbon Dioxide (CO2) 22 - 29 mmol/L 26   Urea Nitrogen 8.0 - 23.0 mg/dL 27.2 (H) "   Creatinine 0.67 - 1.17 mg/dL 1.46 (H)   GFR Estimate >60 mL/min/1.73m2 48 (L)   Calcium 8.8 - 10.2 mg/dL 8.7 (L)   Anion Gap 7 - 15 mmol/L 9   Albumin 3.5 - 5.2 g/dL 3.9   Protein Total 6.4 - 8.3 g/dL 6.2 (L)   Alkaline Phosphatase 40 - 129 U/L 66   ALT 0 - 70 U/L 46   AST 0 - 45 U/L 26   Bilirubin Total <=1.2 mg/dL 0.5   Glucose 70 - 99 mg/dL 206 (H)   Lactate Dehydrogenase 0 - 250 U/L 134   WBC 4.0 - 11.0 10e3/uL 13.8 (H)   Hemoglobin 13.3 - 17.7 g/dL 8.4 (L)   Hematocrit 40.0 - 53.0 % 27.3 (L)   Platelet Count 150 - 450 10e3/uL 117 (L)   RBC Count 4.40 - 5.90 10e6/uL 3.20 (L)   MCV 78 - 100 fL 85   MCH 26.5 - 33.0 pg 26.3 (L)   MCHC 31.5 - 36.5 g/dL 30.8 (L)   RDW 10.0 - 15.0 % 17.2 (H)   % Neutrophils % 27   % Lymphocytes % 67   % Monocytes % 4   % Eosinophils % 1   % Basophils % 0   Absolute Basophils 0.0 - 0.2 10e3/uL 0.0   Absolute Eosinophils 0.0 - 0.7 10e3/uL 0.1   Absolute Immature Granulocytes <=0.4 10e3/uL 0.1   Absolute Lymphocytes 0.8 - 5.3 10e3/uL 9.4 (H)   Absolute Monocytes 0.0 - 1.3 10e3/uL 0.5   % Immature Granulocytes % 1   Absolute Neutrophils 1.6 - 8.3 10e3/uL 3.7   Absolute NRBCs 10e3/uL 0.0   NRBCs per 100 WBC <1 /100 0   RBC Morphology  Confirmed RBC Indices   Platelet Morphology Automated Count Confirmed. Platelet morphology is normal.  Automated Count Confirmed. Platelet morphology is normal.      Latest Reference Range & Units 08/03/23 14:13   CEA ng/mL 18.9   Ferritin 31 - 409 ng/mL 105   Iron 61 - 157 ug/dL 34 (L)   Iron Binding Capacity 240 - 430 ug/dL 328   Iron Sat Index 15 - 46 % 10 (L)   Lactate Dehydrogenase 0 - 250 U/L 193     PATHOLOGY:       Component Ref Range & Units    Case Report   Surgical Pathology Report                         Case: [id number]                                   Authorizing Provider:  Julio Castro MD Collected:           07/28/2023 02:40 PM           Ordering Location:     [facility]   Received:            07/28/2023 03:03 PM                                   Main OR                                                                       Pathologist:           Itzel Bains MD PhD                                                       Specimen:    Large Intestine, Colon, Transverse, Transverse colon mass                                  Final Diagnosis   A(1). Transverse colon mass, biopsy:  -Invasive moderately differentiated adenocarcinoma (see comment)      Addendum electronically signed by Itzel Bains MD PhD on 8/2/2023 at  7:50 AM  Electronically signed by Itzel Bains MD PhD on 7/31/2023 at  9:52 AM   Comment     Immunohistochemical studies for MMR proteins have been requested and will be reported as an addendum when available   Synoptic Checklist   Colon and Rectum Biomarker Reporting Template   Protocol posted: 6/30/2021(Added in Addendum) COLON AND RECTUM: BIOMARKER REPORTING TEMPLATE - All Specimens  RESULTS   Mismatch Repair     Immunohistochemistry (IHC) Testing for Mismatch Repair (MMR) Proteins     MLH1 Result  Intact nuclear expression   Immunohistochemistry (IHC) Testing for Mismatch Repair (MMR) Proteins     MSH2 Result  Intact nuclear expression   Immunohistochemistry (IHC) Testing for Mismatch Repair (MMR) Proteins     MSH6 Result  Intact nuclear expression   Immunohistochemistry (IHC) Testing for Mismatch Repair (MMR) Proteins     PMS2 Result  Intact nuclear expression   Immunohistochemistry (IHC) Testing for Mismatch Repair (MMR) Proteins  Background nonneoplastic tissue / internal control with intact nuclear expression   IHC Interpretation  No loss of nuclear expression of MMR proteins: low probability of MSI-H             ASSESSMENT/PLAN:  Austin Garza is an 83 year old male with:    1) Recent diagnosis of adenocarcinoma of the transverse colon, SAMUEL  -Colonoscopy 7/26/23 with non-obstructing large mass in the transverse colon involving 1/3 of the lumen conference, measuring 5 cm in diameter.  -CT CAP done  yesterday at an outside institution and will request this.  -He is to meet with surgery next week.  -Discussed if early stage, he will review his candidacy for surgery with CRC. If not a candidate or with more advanced disease, will need to discuss systemic treatment.   -CEA 18.9 upon diagnosis.   -On 8/3/23, ferritin 105, iron 34, TIBC 328, Iron sat 10%. 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 Will write for venofer 300 mg x3.  -Will transfuse for Hb </=8 given and PLT </= 50K given his significant cardiac history and need for antiplatelet therapy.    2) CLL/SLL: BLACKWOOD stage III, with lymphocytosis, lymphadenopathy, splenomegaly, and anemia.  He has mild thrombocytopenia as well, but is above 100K.    -He presented with leukocytosis with WBC of 61.7K, hemoglobin of 10.4, and platelet count of 115K on diagnosis.  Bone marrow biopsy and flow cytometry confirmed CLL/SLL.  CT scan shows mildly enlarged left axillary lymph node and periaortic lymph nodes in the retroperitoneum of the abdomen, with enlarged spleen.    -Due to worsening lymphocyte count, anemia, thrombocytopenia, as well as fatigue and night sweats, he started ibrutinib on 5/4/20.    -In light of his recent issues with atrial fibrillation and now on Eliquis, his ibrutinib has been on hold since August 2021.  Considering his CLL is under good control, his WBC is actually on the lower end of normal, and with risk of atrial fibrillation and bleeding on anticoagulants, we will continue to hold ibrutinib for now and monitor his counts. He has persistent thrombocytopenia. He will likely need to resume treatment at some point, but we can follow the counts and we could also consider other treatments as well for CLL.  -Patient's thrombocytopenia has improved (>100K since June 2023). WBCs are ranging 13-14, ALC 8-9%. Will continue to monitor.    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 He completed radiation at [facility] with planned 60 Gy x 30 fractions. He has completed radiation and noted that he did not need any more follow-up for this.    3) Abdominal aortic aneurysm: measures up to 7.6 cm on CT scan, incidentally found.  He underwent EVAR on 12/4/17.  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  He is on Plavix now.  On 5/13/19, he underwent and percutaneous aneurysm sac puncture and endo leak embolization by IR on 5/13/19.  -Follow-up with vascular surgery and IR.    4) Diabetes, hypertension:    -Following with PCP.    5) Atrial fibrillation with RVR s/p successful DCCV to SR:  -On metoprolol. He is off eliquis.  -Following with cardiology.    6) CAD:   -Follow-up with cardiology  -There are continued discussions in regards to PCI/CABG. He is uncertain if he is following with [place] or locally.  -Plavix is currently held per hospital discharge. He is on aspirin 81 mg daily.    7) -Schedule venofer 300 mg weekly x3.   -Return to clinic in 3 weeks to review the above.       Miriam Badillo DO  Hematology/Oncology  [facility]

## 2023-08-17 NOTE — NURSING NOTE
"Oncology Rooming Note    August 17, 2023 12:23 PM   Austin Garza is a 83 year old male who presents for:    Chief Complaint   Patient presents with    Oncology Clinic Visit     Chronic lymphocytic leukemia (CLL), B-cell     Initial Vitals: BP (!) 147/74   Pulse 78   Temp 97.9  F (36.6  C) (Oral)   Resp 16   Ht 1.753 m (5' 9\")   Wt 87.1 kg (192 lb)   SpO2 99%   BMI 28.35 kg/m   Estimated body mass index is 28.35 kg/m  as calculated from the following:    Height as of this encounter: 1.753 m (5' 9\").    Weight as of this encounter: 87.1 kg (192 lb). Body surface area is 2.06 meters squared.  No Pain (0) Comment: Data Unavailable   No LMP for male patient.  Allergies reviewed: Yes  Medications reviewed: Yes    Medications: Medication refills not needed today.  Pharmacy name entered into [facility]: [id number] - [place], [place] - [address] AT [facility]    Clinical concerns: f/u       Delfina Baker, JERI              "

## 2023-08-23 NOTE — LETTER
8/23/2023    Luma Kauffman MD  [address] [facility] [address]    RE: Austin Garza       Dear Colleague,     I had the pleasure of seeing Austin Garza in the [facility].  HISTORY:    Austin Garza is a pleasant 83-year-old male accompanied by his wife today.  He has a history of abdominal aortic aneurysm with endovascular repair, hypertension, type 2 diabetes, CLL, paroxysmal atrial fibrillation, mild cardiomyopathy in the past, intermittent pericardial effusions, and coronary artery disease.  I last saw him a little over a year ago at which time I sent him for coronary angiogram.  The angiogram showed severe multivessel disease and coronary artery bypass surgery was recommended because of his thrombocytopenia.  He elected to go down to [place] where multivessel stenting was successfully performed.  He presents now for reestablishment of care.    Today Benji reports that he feels that he is doing well.  He has known to have a malignant polyp and is awaiting surgery for this.  He has had a GI bleed and his hemoglobin is low.  Hemoglobin was as low as 7.1 and his most recent value, after transfusion, is still only 8.5.  He is scheduled for iron infusion in the near future.  He finds himself winded easily and has some orthostatic dizziness intermittently.  Does not feel like his breathing and stamina have improved very much since his surgery.    A echocardiogram was completed in late July and his systolic function was judged to be normal at 55 to 60%.  The inferior vena cava was normal in size with normal respiratory variation.  The ascending aorta was dilated at 4.6 cm.  2 years ago it was measured at 4.3 cm.      ASSESSMENT/PLAN:    1.  Paroxysmal atrial fibrillation not on anticoagulation because of bleeding risk with his CLL.  Symptoms of palpitations.  2.  Coronary artery disease with four-vessel stenting at [facility] last fall.  He has not had much improvement in  symptoms but has substantial anemia that are probably causing fairly identical symptoms now.  His EF has normalized.  3.  History of abdominal aortic aneurysm with EVAR  4.  Type 2 diabetes  5.  CKD stage III  6.  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  Ascending aortic aneurysm 4.6 cm.  We will keep a close eye on this with serial echoes at this time.  He is using a beta-blocker and I am initiating losartan at low-dose to help prevent further aortic dilation.  Contact me with side effects and we will depend on his upcoming surgical procedures to make sure he does not have further renal deterioration.  The addition of losartan may actually be renal protective for him.  8.  History of hypertension.  Blood pressure is on the low side today I warned him of potential side effects of lightheadedness with addition of losartan.    Thank you for inviting me to participate in the care of your patient.  Please don't hesitate to call if I can be of further assistance.  63 minutes were spent today reviewing the chart and other records, interviewing and examining the patient, and documenting our visit.  1 year follow-up with echocardiogram will be planned.    Chart documentation was completed, in part, with [employer] voice-recognition software. Even though reviewed, some grammatical, spelling, and word errors may remain.       Orders Placed This Encounter   Procedures    Follow-Up with Cardiology    Echocardiogram Complete     Orders Placed This Encounter   Medications    losartan (COZAAR) 25 MG tablet     Sig: Take 1 tablet (25 mg) by mouth daily     Dispense:  90 tablet     Refill:  3     There are no discontinued medications.    10 year ASCVD risk: The ASCVD Risk score (Barb DK, et al., 2019) failed to calculate for the following reasons:    The 2019 ASCVD risk score is only valid for ages 40 to 79    Encounter Diagnoses   Name Primary?    Atherosclerosis of native coronary artery of native heart  "without angina pectoris     Cardiomyopathy, unspecified type (H)     Hypertension goal BP (blood pressure) < 140/90     Aneurysm of ascending aorta without rupture (H) Yes       CURRENT MEDICATIONS:  Current Outpatient Medications   Medication Sig Dispense Refill    Alcohol Swabs (ALCOHOL PREP) 70 % PADS       aspirin 81 MG EC tablet Take 1 tablet (81 mg) by mouth daily 30 tablet 1    blood glucose (NO BRAND SPECIFIED) lancets standard Use to test blood sugar 3 times daily or as directed. 100 each 3    blood glucose (NO BRAND SPECIFIED) test strip Contour Next Test Strips-Use to test blood sugar 3 times daily or as directed. 300 strip 1    Dulaglutide 4.5 MG/0.5ML SOPN Inject 4.5 mg Subcutaneous once a week 6 mL 4    insulin aspart (NOVOLOG PEN) 100 UNIT/ML pen Inject 1-7 Units Subcutaneous 3 times daily (before meals) Do Not give Correction Insulin if Pre-Meal BG less than 140. For Pre-Meal  - 189 give 1 unit. For Pre-Meal  - 239 give 2 units. For Pre-Meal  - 289 give 3 units. For Pre-Meal  - 339 give 4 units. For Pre-Meal - 399 give 5 units. For Pre-Meal -449 give 6 units For Pre-Meal BG greater than or equal to 450 give 7 units. 15 mL 1    insulin glargine (LANTUS PEN) 100 UNIT/ML pen Inject 10 Units Subcutaneous every morning (before breakfast) 15 mL 0    losartan (COZAAR) 25 MG tablet Take 1 tablet (25 mg) by mouth daily 90 tablet 3    metoprolol succinate ER (TOPROL XL) 100 MG 24 hr tablet Take 1 tablet (100 mg) by mouth daily 90 tablet 4    nitroGLYcerin (NITROSTAT) 0.4 MG sublingual tablet One tablet under the tongue every 5 minutes if needed for chest pain. May repeat every 5 minutes for a maximum of 3 doses in 15 minutes\" 25 tablet 3    pantoprazole (PROTONIX) 40 MG EC tablet Take 1 tablet (40 mg) by mouth daily 90 tablet 4    predniSONE (DELTASONE) 10 MG tablet Take 1 tablet (10 mg) by mouth daily 30 tablet 0    rosuvastatin (CRESTOR) 20 MG tablet Take 1 tablet (20 mg) " by mouth daily 90 tablet 4       ALLERGIES     Allergies   Allergen Reactions    Ace Inhibitors Cough     cough  cough       PAST MEDICAL HISTORY:  Past Medical History:   Diagnosis Date    AAA (abdominal aortic aneurysm)     BCC (basal cell carcinoma of skin) - face     CLL (chronic lymphocytic leukemia)     Diaphragmatic hernia     Diverticulitis of colon     Esophageal reflux     Essential hypertension     Hyperlipidemia     Irritable bowel syndrome     Macular degeneration (senile) of retina     Mumps     Squamous Cell Carcinoma, Back 1988    Type 2 diabetes mellitus        PAST SURGICAL HISTORY:  Past Surgical History:   Procedure Laterality Date    ANESTHESIA CARDIOVERSION N/A 8/2/2021    Procedure: ANESTHESIA, FOR CARDIOVERSION;  Surgeon: GENERIC ANESTHESIA PROVIDER;  Location: [facility] OR    APPENDECTOMY OPEN  1966    BONE MARROW BIOPSY, BONE SPECIMEN, NEEDLE/TROCAR N/A 11/21/2017    Procedure: BIOPSY BONE MARROW;  BONE MARROW BIOPSY;  Surgeon: Shady Garcia MD;  Location:  GI    COLONOSCOPY  2002    COLONOSCOPY N/A 7/28/2023    Procedure: COLONOSCOPY;  Surgeon: Julio Castro MD;  Location:  OR    CV CORONARY ANGIOGRAM N/A 6/29/2022    Procedure: Coronary Angiogram;  Surgeon: Evaristo Beaulieu MD;  Location:  HEART CARDIAC CATH LAB    ENDOVASCULAR REPAIR ANEURYSM ABDOMINAL AORTA N/A 12/4/2017    Procedure: ENDOVASCULAR REPAIR ANEURYSM ABDOMINAL AORTA;  ENDOVASCULAR REPAIR ANEURYSM ABDOMINAL AORTA;  Surgeon: Milton Cazares MD;  Location:  OR    Fistulotomy with marsupialization for repair of fisture in ano  2007    IR ABDOMINAL AORTOGRAM  3/11/2019    IR VISCERAL EMBOLIZATION  5/13/2019    Multiple skin tags - resection  1998    Squamous cell skin cancer resection - back  1985-90    VASECTOMY         FAMILY HISTORY:  Family History   Problem Relation Age of Onset    Cerebrovascular Disease Father         x 2    Hypertension Mother     C.A.D. Mother     Cancer Mother         skin  "   Eye Disorder Mother         glaucoma    Prostate Cancer No family hx of     Cancer - colorectal No family hx of     Glaucoma No family hx of     Macular Degeneration No family hx of        SOCIAL HISTORY:  Social History     Socioeconomic History    Marital status:      Spouse name: librado    Number of children: 0    Years of education: 17    Highest education level: None   Occupational History    Occupation: retired   Tobacco Use    Smoking status: Former     Packs/day: 0.50     Years: 20.00     Pack years: 10.00     Types: Cigarettes     Quit date: 1975     Years since quittin.6    Smokeless tobacco: Former   Substance and Sexual Activity    Alcohol use: Yes     Alcohol/week: 10.0 - 14.0 standard drinks of alcohol     Types: 10 - 14 Standard drinks or equivalent per week     Comment: 3 drinks a day/wine    Drug use: No    Sexual activity: Never     Social Determinants of Health     Intimate Partner Violence: Not At Risk (2023)    Humiliation, Afraid, Rape, and Kick questionnaire     Fear of Current or Ex-Partner: No     Emotionally Abused: No     Physically Abused: No     Sexually Abused: No       Review of Systems:  Skin:  Negative     Eyes:  Positive for glasses  ENT:  Negative for hearing loss  Respiratory:  Positive for dyspnea on exertion;cough  Cardiovascular:  Negative for;palpitations;chest pain;fatigue;edema Positive for;lightheadedness;dizziness  Gastroenterology: Positive for poor appetite  Genitourinary:  Positive for urinary frequency  Musculoskeletal:  Positive for back pain  Neurologic:  Positive for numbness or tingling of feet;headaches  Psychiatric:  Positive for sleep disturbances  Heme/Lymph/Imm:  Positive for weight loss  Endocrine:  Positive for diabetes    Physical Exam:  Vitals: /60   Pulse 92   Ht 1.753 m (5' 9\")   Wt 87 kg (191 lb 11.2 oz)   SpO2 98%   BMI 28.31 kg/m      Constitutional:           Skin:           Head:           Eyes:           ENT:       "     Neck:           Chest:           Cardiac:                    Abdomen:           Vascular:                                        Extremities and Muscular Skeletal:              Neurological:           Psych:        Recent Lab Results:  LIPID RESULTS:  Lab Results   Component Value Date    CHOL 64 07/26/2023    CHOL 166 04/05/2021    HDL 14 (L) 07/26/2023    HDL 57 04/05/2021    LDL 27 07/26/2023    LDL 96 04/05/2021    TRIG 115 07/26/2023    TRIG 64 04/05/2021    CHOLHDLRATIO 2.5 10/23/2015       LIVER ENZYME RESULTS:  Lab Results   Component Value Date    AST 26 08/15/2023    AST 15 06/01/2021    ALT 46 08/15/2023    ALT 21 06/01/2021       CBC RESULTS:  Lab Results   Component Value Date    WBC 13.8 (H) 08/15/2023    WBC 12.7 (H) 06/01/2021    RBC 3.20 (L) 08/15/2023    RBC 4.22 (L) 06/01/2021    HGB 8.4 (L) 08/15/2023    HGB 13.5 06/01/2021    HCT 27.3 (L) 08/15/2023    HCT 41.3 06/01/2021    MCV 85 08/15/2023    MCV 98 06/01/2021    MCH 26.3 (L) 08/15/2023    MCH 32.0 06/01/2021    MCHC 30.8 (L) 08/15/2023    MCHC 32.7 06/01/2021    RDW 17.2 (H) 08/15/2023    RDW 13.0 06/01/2021     (L) 08/15/2023     (L) 06/01/2021       BMP RESULTS:  Lab Results   Component Value Date     (L) 08/15/2023     06/01/2021    POTASSIUM 4.6 08/15/2023    POTASSIUM 3.9 08/02/2022    POTASSIUM 4.2 06/01/2021    CHLORIDE 100 08/15/2023    CHLORIDE 103 08/02/2022    CHLORIDE 104 06/01/2021    CO2 26 08/15/2023    CO2 24 08/02/2022    CO2 30 06/01/2021    ANIONGAP 9 08/15/2023    ANIONGAP 10 08/02/2022    ANIONGAP 2 (L) 06/01/2021     (H) 08/15/2023     (H) 07/30/2023     (H) 08/02/2022     (H) 06/01/2021    BUN 27.2 (H) 08/15/2023    BUN 20 08/02/2022    BUN 26 06/01/2021    CR 1.46 (H) 08/15/2023    CR 1.51 (H) 06/01/2021    GFRESTIMATED 48 (L) 08/15/2023    GFRESTIMATED 43 (L) 06/01/2021    GFRESTBLACK 50 (L) 06/01/2021    MAGDALENO 8.7 (L) 08/15/2023    MAGDALENO 8.9 06/01/2021        A1C  RESULTS:  Lab Results   Component Value Date    A1C 7.1 (H) 07/07/2023    A1C 5.9 (H) 04/05/2021       INR RESULTS:  Lab Results   Component Value Date    INR 1.22 (H) 07/26/2023    INR 1.03 06/29/2022    INR 1.08 05/13/2019    INR 1.03 03/11/2019         Juwan Bah MD, [facility]    CC  Luma Kauffman MD  [address] [place] [address]                  Thank you for allowing me to participate in the care of your patient.      Sincerely,     Juwan Bah MD     [facility]

## 2023-08-23 NOTE — PROGRESS NOTES
HISTORY:    Austin Garza is a pleasant 83-year-old male accompanied by his wife today.  He has a history of abdominal aortic aneurysm with endovascular repair, hypertension, type 2 diabetes, CLL, paroxysmal atrial fibrillation, mild cardiomyopathy in the past, intermittent pericardial effusions, and coronary artery disease.  I last saw him a little over a year ago at which time I sent him for coronary angiogram.  The angiogram showed severe multivessel disease and coronary artery bypass surgery was recommended because of his thrombocytopenia.  He elected to go down to [place] where multivessel stenting was successfully performed.  He presents now for reestablishment of care.    Today Benji reports that he feels that he is doing well.  He has known to have a malignant polyp and is awaiting surgery for this.  He has had a GI bleed and his hemoglobin is low.  Hemoglobin was as low as 7.1 and his most recent value, after transfusion, is still only 8.5.  He is scheduled for iron infusion in the near future.  He finds himself winded easily and has some orthostatic dizziness intermittently.  Does not feel like his breathing and stamina have improved very much since his surgery.    A echocardiogram was completed in late July and his systolic function was judged to be normal at 55 to 60%.  The inferior vena cava was normal in size with normal respiratory variation.  The ascending aorta was dilated at 4.6 cm.  2 years ago it was measured at 4.3 cm.      ASSESSMENT/PLAN:    1.  Paroxysmal atrial fibrillation not on anticoagulation because of bleeding risk with his CLL.  Symptoms of palpitations.  2.  Coronary artery disease with four-vessel stenting at [facility] last fall.  He has not had much improvement in symptoms but has substantial anemia that are probably causing fairly identical symptoms now.  His EF has normalized.  3.  History of abdominal aortic aneurysm with EVAR  4.  Type 2 diabetes  5.  CKD stage III  6.   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  Ascending aortic aneurysm 4.6 cm.  We will keep a close eye on this with serial echoes at this time.  He is using a beta-blocker and I am initiating losartan at low-dose to help prevent further aortic dilation.  Contact me with side effects and we will depend on his upcoming surgical procedures to make sure he does not have further renal deterioration.  The addition of losartan may actually be renal protective for him.  8.  History of hypertension.  Blood pressure is on the low side today I warned him of potential side effects of lightheadedness with addition of losartan.    Thank you for inviting me to participate in the care of your patient.  Please don't hesitate to call if I can be of further assistance.  55 minutes were spent today reviewing the chart and other records, interviewing and examining the patient, and documenting our visit.  1 year follow-up with echocardiogram will be planned.    Chart documentation was completed, in part, with [employer] voice-recognition software. Even though reviewed, some grammatical, spelling, and word errors may remain.       Orders Placed This Encounter   Procedures    Follow-Up with Cardiology    Echocardiogram Complete     Orders Placed This Encounter   Medications    losartan (COZAAR) 25 MG tablet     Sig: Take 1 tablet (25 mg) by mouth daily     Dispense:  90 tablet     Refill:  3     There are no discontinued medications.    10 year ASCVD risk: The ASCVD Risk score (Lyles DK, et al., 2019) failed to calculate for the following reasons:    The 2019 ASCVD risk score is only valid for ages 40 to 79    Encounter Diagnoses   Name Primary?    Atherosclerosis of native coronary artery of native heart without angina pectoris     Cardiomyopathy, unspecified type (H)     Hypertension goal BP (blood pressure) < 140/90     Aneurysm of ascending aorta without rupture (H) Yes       CURRENT MEDICATIONS:  Current Outpatient  "Medications   Medication Sig Dispense Refill    Alcohol Swabs (ALCOHOL PREP) 70 % PADS       aspirin 81 MG EC tablet Take 1 tablet (81 mg) by mouth daily 30 tablet 1    blood glucose (NO BRAND SPECIFIED) lancets standard Use to test blood sugar 3 times daily or as directed. 100 each 3    blood glucose (NO BRAND SPECIFIED) test strip Contour Next Test Strips-Use to test blood sugar 3 times daily or as directed. 300 strip 1    Dulaglutide 4.5 MG/0.5ML SOPN Inject 4.5 mg Subcutaneous once a week 6 mL 4    insulin aspart (NOVOLOG PEN) 100 UNIT/ML pen Inject 1-7 Units Subcutaneous 3 times daily (before meals) Do Not give Correction Insulin if Pre-Meal BG less than 140. For Pre-Meal  - 189 give 1 unit. For Pre-Meal  - 239 give 2 units. For Pre-Meal  - 289 give 3 units. For Pre-Meal  - 339 give 4 units. For Pre-Meal - 399 give 5 units. For Pre-Meal -449 give 6 units For Pre-Meal BG greater than or equal to 450 give 7 units. 15 mL 1    insulin glargine (LANTUS PEN) 100 UNIT/ML pen Inject 10 Units Subcutaneous every morning (before breakfast) 15 mL 0    losartan (COZAAR) 25 MG tablet Take 1 tablet (25 mg) by mouth daily 90 tablet 3    metoprolol succinate ER (TOPROL XL) 100 MG 24 hr tablet Take 1 tablet (100 mg) by mouth daily 90 tablet 4    nitroGLYcerin (NITROSTAT) 0.4 MG sublingual tablet One tablet under the tongue every 5 minutes if needed for chest pain. May repeat every 5 minutes for a maximum of 3 doses in 15 minutes\" 25 tablet 3    pantoprazole (PROTONIX) 40 MG EC tablet Take 1 tablet (40 mg) by mouth daily 90 tablet 4    predniSONE (DELTASONE) 10 MG tablet Take 1 tablet (10 mg) by mouth daily 30 tablet 0    rosuvastatin (CRESTOR) 20 MG tablet Take 1 tablet (20 mg) by mouth daily 90 tablet 4       ALLERGIES     Allergies   Allergen Reactions    Ace Inhibitors Cough     cough  cough       PAST MEDICAL HISTORY:  Past Medical History:   Diagnosis Date    AAA (abdominal aortic " aneurysm)     BCC (basal cell carcinoma of skin) - face     CLL (chronic lymphocytic leukemia)     Diaphragmatic hernia     Diverticulitis of colon     Esophageal reflux     Essential hypertension     Hyperlipidemia     Irritable bowel syndrome     Macular degeneration (senile) of retina     Mumps     Squamous Cell Carcinoma, Back 1988    Type 2 diabetes mellitus        PAST SURGICAL HISTORY:  Past Surgical History:   Procedure Laterality Date    ANESTHESIA CARDIOVERSION N/A 8/2/2021    Procedure: ANESTHESIA, FOR CARDIOVERSION;  Surgeon: GENERIC ANESTHESIA PROVIDER;  Location: [facility] OR    APPENDECTOMY OPEN  1966    BONE MARROW BIOPSY, BONE SPECIMEN, NEEDLE/TROCAR N/A 11/21/2017    Procedure: BIOPSY BONE MARROW;  BONE MARROW BIOPSY;  Surgeon: Shady Garcia MD;  Location:  GI    COLONOSCOPY  2002    COLONOSCOPY N/A 7/28/2023    Procedure: COLONOSCOPY;  Surgeon: Julio Castro MD;  Location:  OR    CV CORONARY ANGIOGRAM N/A 6/29/2022    Procedure: Coronary Angiogram;  Surgeon: Evaristo Beaulieu MD;  Location:  HEART CARDIAC CATH LAB    ENDOVASCULAR REPAIR ANEURYSM ABDOMINAL AORTA N/A 12/4/2017    Procedure: ENDOVASCULAR REPAIR ANEURYSM ABDOMINAL AORTA;  ENDOVASCULAR REPAIR ANEURYSM ABDOMINAL AORTA;  Surgeon: Milton Cazares MD;  Location:  OR    Fistulotomy with marsupialization for repair of fisture in ano  2007    IR ABDOMINAL AORTOGRAM  3/11/2019    IR VISCERAL EMBOLIZATION  5/13/2019    Multiple skin tags - resection  1998    Squamous cell skin cancer resection - back  1985-90    VASECTOMY         FAMILY HISTORY:  Family History   Problem Relation Age of Onset    Cerebrovascular Disease Father         x 2    Hypertension Mother     C.A.D. Mother     Cancer Mother         skin    Eye Disorder Mother         glaucoma    Prostate Cancer No family hx of     Cancer - colorectal No family hx of     Glaucoma No family hx of     Macular Degeneration No family hx of        SOCIAL  "HISTORY:  Social History     Socioeconomic History    Marital status:      Spouse name: librado    Number of children: 0    Years of education: 17    Highest education level: None   Occupational History    Occupation: retired   Tobacco Use    Smoking status: Former     Packs/day: 0.50     Years: 20.00     Pack years: 10.00     Types: Cigarettes     Quit date: 1975     Years since quittin.6    Smokeless tobacco: Former   Substance and Sexual Activity    Alcohol use: Yes     Alcohol/week: 10.0 - 14.0 standard drinks of alcohol     Types: 10 - 14 Standard drinks or equivalent per week     Comment: 3 drinks a day/wine    Drug use: No    Sexual activity: Never     Social Determinants of Health     Intimate Partner Violence: Not At Risk (2023)    Humiliation, Afraid, Rape, and Kick questionnaire     Fear of Current or Ex-Partner: No     Emotionally Abused: No     Physically Abused: No     Sexually Abused: No       Review of Systems:  Skin:  Negative     Eyes:  Positive for glasses  ENT:  Negative for hearing loss  Respiratory:  Positive for dyspnea on exertion;cough  Cardiovascular:  Negative for;palpitations;chest pain;fatigue;edema Positive for;lightheadedness;dizziness  Gastroenterology: Positive for poor appetite  Genitourinary:  Positive for urinary frequency  Musculoskeletal:  Positive for back pain  Neurologic:  Positive for numbness or tingling of feet;headaches  Psychiatric:  Positive for sleep disturbances  Heme/Lymph/Imm:  Positive for weight loss  Endocrine:  Positive for diabetes    Physical Exam:  Vitals: /60   Pulse 92   Ht 1.753 m (5' 9\")   Wt 87 kg (191 lb 11.2 oz)   SpO2 98%   BMI 28.31 kg/m      Constitutional:           Skin:           Head:           Eyes:           ENT:           Neck:           Chest:           Cardiac:                    Abdomen:           Vascular:                                        Extremities and Muscular Skeletal:              Neurological:      "      Psych:        Recent Lab Results:  LIPID RESULTS:  Lab Results   Component Value Date    CHOL 64 07/26/2023    CHOL 166 04/05/2021    HDL 14 (L) 07/26/2023    HDL 57 04/05/2021    LDL 27 07/26/2023    LDL 96 04/05/2021    TRIG 115 07/26/2023    TRIG 64 04/05/2021    CHOLHDLRATIO 2.5 10/23/2015       LIVER ENZYME RESULTS:  Lab Results   Component Value Date    AST 26 08/15/2023    AST 15 06/01/2021    ALT 46 08/15/2023    ALT 21 06/01/2021       CBC RESULTS:  Lab Results   Component Value Date    WBC 13.8 (H) 08/15/2023    WBC 12.7 (H) 06/01/2021    RBC 3.20 (L) 08/15/2023    RBC 4.22 (L) 06/01/2021    HGB 8.4 (L) 08/15/2023    HGB 13.5 06/01/2021    HCT 27.3 (L) 08/15/2023    HCT 41.3 06/01/2021    MCV 85 08/15/2023    MCV 98 06/01/2021    MCH 26.3 (L) 08/15/2023    MCH 32.0 06/01/2021    MCHC 30.8 (L) 08/15/2023    MCHC 32.7 06/01/2021    RDW 17.2 (H) 08/15/2023    RDW 13.0 06/01/2021     (L) 08/15/2023     (L) 06/01/2021       BMP RESULTS:  Lab Results   Component Value Date     (L) 08/15/2023     06/01/2021    POTASSIUM 4.6 08/15/2023    POTASSIUM 3.9 08/02/2022    POTASSIUM 4.2 06/01/2021    CHLORIDE 100 08/15/2023    CHLORIDE 103 08/02/2022    CHLORIDE 104 06/01/2021    CO2 26 08/15/2023    CO2 24 08/02/2022    CO2 30 06/01/2021    ANIONGAP 9 08/15/2023    ANIONGAP 10 08/02/2022    ANIONGAP 2 (L) 06/01/2021     (H) 08/15/2023     (H) 07/30/2023     (H) 08/02/2022     (H) 06/01/2021    BUN 27.2 (H) 08/15/2023    BUN 20 08/02/2022    BUN 26 06/01/2021    CR 1.46 (H) 08/15/2023    CR 1.51 (H) 06/01/2021    GFRESTIMATED 48 (L) 08/15/2023    GFRESTIMATED 43 (L) 06/01/2021    GFRESTBLACK 50 (L) 06/01/2021    MAGDALENO 8.7 (L) 08/15/2023    MAGDALENO 8.9 06/01/2021        A1C RESULTS:  Lab Results   Component Value Date    A1C 7.1 (H) 07/07/2023    A1C 5.9 (H) 04/05/2021       INR RESULTS:  Lab Results   Component Value Date    INR 1.22 (H) 07/26/2023    INR 1.03  06/29/2022    INR 1.08 05/13/2019    INR 1.03 03/11/2019         Juwan Bah MD, FACC    CC  Luma Kauffman MD  [address] [place] [address]

## 2023-08-25 NOTE — TELEPHONE ENCOUNTER
Reason for Call:  Appointment Request    Patient requesting this type of appt: Pre-op    Requested provider: Luma Kauffman    Reason patient unable to be scheduled: Not within requested timeframe    When does patient want to be seen/preferred time:  Before September 8    Comments: PRE-OP-09/08/23-COLON SURGERY-DR HEMANT IMN- [facility]    Could we send this information to you in [facility] or would you prefer to receive a phone call?:   Patient would prefer a phone call   Okay to leave a detailed message?: Yes at Cell number on file:    Telephone Information:   Mobile [phone]       Call taken on 8/25/2023 at 10:47 AM by Felicia PENG

## 2023-08-27 NOTE — PATIENT INSTRUCTIONS
Benji is scheduled for weekly Venofer. He is also scheduled for a follow up with Dr. Badillo on 09/15/23.    Letty Arevalo RN on 8/27/2023 at 8:34 AM

## 2023-08-30 NOTE — TELEPHONE ENCOUNTER
I would recommend patient stay on current dose of prednisone until he follows up with Dr. Kauffman in a week; they can discuss tapering at that time. He should not stop cold turkey.     Can send in 10 tabs of 10mg prednisone to pharmacy of his choice.    Sandi Eastman MD  Internal Medicine-Pediatrics

## 2023-08-30 NOTE — TELEPHONE ENCOUNTER
LM for patient that Dr. Eastman would like patient to continue on Prednisone 10 mg daily, and that this medication should not be stopped abruptly.  RX sent to the pharmacy per Dr. Eastman's note below.  Advised patient to call the clinic with any other questions or concerns.  QUIRINO Acevedo RN

## 2023-08-30 NOTE — TELEPHONE ENCOUNTER
Dr. Kauffman,    Pt called     He is unsure if he should still be taking the prednisone- he is out as of today  He seems rheum on Sept 26th    This was originally ordered by a hospital dr when he was admitted for joint pain    He is okay with stopping the prednisone  He says he's okay stopping it  He is using more insulin due to the prednisone    LOV: 8/03/23 Dr. Li    Pt is having a lap mercy next week  Pt has a pre-op tomorrow    Please advise    Thank you  Agapito Weathers RN on 8/30/2023 at 10:58 AM

## 2023-08-30 NOTE — TELEPHONE ENCOUNTER
It could be discussed tomorrow, although I'm not sure right before surgery is the correct time to taper down on steroids. It may be more appropriate to discuss post-operatively once he is through the worst of it.     Unfortunately I think he's going to need to be on some form of steroids regardless for his surgery as I think he'll probably need to taper down given how long he's been on the 10mg dose.     Sandi Eastman MD  Internal Medicine-Pediatrics

## 2023-08-30 NOTE — TELEPHONE ENCOUNTER
For clarification-patient is scheduled for surgery on 09/08/23 for a Laparoscopic Right Colectomy.  Patient is scheduled for pre-op exam 08/31.  Should I advise the 10 mg dose for today, and then they can discuss tapering at pre-op tomorrow?  Thank you.  QUIRINO Acevedo RN

## 2023-08-31 NOTE — PROGRESS NOTES
[facility] [place]  [address]  [address]  [place] [address]  Phone: [phone]  Fax: [phone]  Primary Provider: Luma Kauffman  Pre-op Performing Provider: CAMRON BILL      PREOPERATIVE EVALUATION:  Today's date: 8/31/2023    Austin Garza is a 83 year old male who presents for a preoperative evaluation.      8/31/2023     2:46 PM   Additional Questions   Roomed by Dorcas Melchor   Accompanied by N/A         8/31/2023     2:46 PM   Patient Reported Additional Medications   Patient reports taking the following new medications No       Surgical Information:  Surgery/Procedure: Laparoscopic Extended Right Colectomy   Surgery Location: [facility]  Surgeon: Marlys Hearn MD  Surgery Date: 9/8/23  Time of Surgery: 1:00PM  Where patient plans to recover: At home with family  Fax number for surgical facility: Note does not need to be faxed, will be available electronically in Epic.    Assessment & Plan     The proposed surgical procedure is considered INTERMEDIATE risk.    Preop general physical exam    - CBC with platelets and differential; Future  - Comprehensive metabolic panel (BMP + Alb, Alk Phos, ALT, AST, Total. Bili, TP); Future  - CBC with platelets and differential  - Comprehensive metabolic panel (BMP + Alb, Alk Phos, ALT, AST, Total. Bili, TP)    Malignant neoplasm of transverse colon (H)      Anemia due to blood loss, acute      Iron deficiency anemia due to chronic blood loss      Gastrointestinal hemorrhage with melena      Hypertension goal BP (blood pressure) < 140/90      Other hyperlipidemia      Transient arthropathy of the pelvic region and thigh, unspecified laterality      Gastroesophageal reflux disease, unspecified whether esophagitis present      Overweight - BMI >35      Chronic lymphocytic leukemia of B-cell type not having achieved remission (H)      Abdominal aortic aneurysm (AAA) without rupture, unspecified  part (H)      Stage 3a chronic kidney disease (H)      Anemia, unspecified type      Bilateral incipient cataracts      Artery dissection (H)      Paroxysmal atrial fibrillation (H)      Sensorineural hearing loss, bilateral      Type 2 diabetes mellitus with other specified complication, with long-term current use of insulin (H)      Status post coronary angiogram      Atherosclerosis of native coronary artery of native heart without angina pectoris      Thrombocytopenia (H)      Cardiomyopathy, unspecified type (H)      CLL (chronic lymphocytic leukemia) (H)             Risks and Recommendations:  The patient has the following additional risks and recommendations for perioperative complications:   - No identified additional risk factors other than previously addressed    Antiplatelet or Anticoagulation Medication Instructions:   - aspirin: Discontinue aspirin 7-10 days prior to procedure to reduce bleeding risk. It should be resumed postoperatively.     Additional Medication Instructions:  Diabetes medications.  Advised to hold short acting insulin the day of surgery and to   For the Lantus, The patient should take 80% of the usual evening dose the night before surgery OR on the morning dose the day of surgery.            RECOMMENDATION:  APPROVAL GIVEN to proceed with proposed procedure, without further diagnostic evaluation.        Subjective       HPI related to upcoming procedure:  Colon cancer.  Colonoscopy on 7/28 found to have colonic mass with adherent clot in transverse colon.  Positive for Adenocarcinoma.  He will have part of colon resected to stop bleeding.  Patient is anemic.        8/31/2023     2:52 PM   Preop Questions   1. Have you ever had a heart attack or stroke? YES - was told he had a quiet stroke   2. Have you ever had surgery on your heart or blood vessels, such as a stent placement, a coronary artery bypass, or surgery on an artery in your head, neck, heart, or legs? YES - 4 stents    3.  Do you have chest pain with activity? No   4. Do you have a history of  heart failure? No   5. Do you currently have a cold, bronchitis or symptoms of other infection? No   6. Do you have a cough, shortness of breath, or wheezing? YES - shortness of breath   7. Do you or anyone in your family have previous history of blood clots? UNKNOWN    8. Do you or does anyone in your family have a serious bleeding problem such as prolonged bleeding following surgeries or cuts? No   9. Have you ever had problems with anemia or been told to take iron pills? YES - Starting iron infusions next week, x1 per week   10. Have you had any abnormal blood loss such as black, tarry or bloody stools? YES - Last week had bloody stool   11. Have you ever had a blood transfusion? YES - Oncologist requested years ago and 2 more when in the hospital in July 11a. Have you ever had a transfusion reaction? No   12. Are you willing to have a blood transfusion if it is medically needed before, during, or after your surgery? Yes   13. Have you or any of your relatives ever had problems with anesthesia? No   14. Do you have sleep apnea, excessive snoring or daytime drowsiness? No   15. Do you have any artifical heart valves or other implanted medical devices like a pacemaker, defibrillator, or continuous glucose monitor? No   16. Do you have artificial joints? No   17. Are you allergic to latex? No     Health Care Directive:  Patient has a Health Care Directive on file      Preoperative Review of :   reviewed - no record of controlled substances prescribed.      Status of Chronic Conditions:  See problem list for active medical problems.  Problems all longstanding and stable, except as noted/documented.  See ROS for pertinent symptoms related to these conditions.    Review of Systems  Constitutional, neuro, ENT, endocrine, pulmonary, cardiac, gastrointestinal, genitourinary, musculoskeletal, integument and psychiatric systems are negative,  except as otherwise noted.    Patient Active Problem List    Diagnosis Date Noted    Iron deficiency anemia due to chronic blood loss 08/17/2023     Priority: Medium    Shortness of breath 07/26/2023     Priority: Medium    Multiple joint pain 07/26/2023     Priority: Medium    Anemia due to blood loss, acute 07/26/2023     Priority: Medium    Gastrointestinal hemorrhage with melena 07/26/2023     Priority: Medium    Cardiomyopathy, unspecified type (H) 05/30/2023     Priority: Medium    Thrombocytopenia (H) 02/16/2023     Priority: Medium    Facial hematoma 02/16/2023     Priority: Medium    Early dry stage nonexudative age-related macular degeneration of both eyes 01/31/2023     Priority: Medium    Posterior subcapsular age-related cataract of right eye 01/31/2023     Priority: Medium    History of colonic polyps 11/28/2022     Priority: Medium     Has large polyp but needs to be off plavix for polypectomy  Needs to be on plavix until at least May 2023 due to coronary stent    Saw [facility]        Atherosclerotic heart disease of native coronary artery without angina pectoris 07/25/2022     Priority: Medium    Status post coronary angiogram 06/29/2022     Priority: Medium    Type 2 diabetes mellitus with other specified complication, with long-term current use of insulin (H) 02/15/2022     Priority: Medium    Sensorineural hearing loss, bilateral 10/06/2021     Priority: Medium    Ureterolithiasis 09/27/2021     Priority: Medium     Formatting of this note might be different from the original.  Added automatically from request for surgery [id number]      Paroxysmal atrial fibrillation (H) 07/30/2021     Priority: Medium    Artery dissection (H) 02/18/2021     Priority: Medium    Bilateral incipient cataracts 02/04/2021     Priority: Medium    Stage 3a chronic kidney disease (H) 06/03/2020     Priority: Medium    Elevated prostate specific antigen (PSA) 12/18/2019     Priority: Medium    Anemia  06/13/2019     Priority: Medium    Abdominal aortic aneurysm (AAA) (H) 11/29/2017     Priority: Medium    Chronic lymphocytic leukemia (CLL), B-cell (H) 11/17/2017     Priority: Medium    BCC (basal cell carcinoma), face 10/23/2015     Priority: Medium     Excised 10/22/15. R temporal area.      Skin lesion of back 10/23/2015     Priority: Medium     Pre-cancerous. 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     Priority: Medium     Unrevealing arthrocentesis except for white cells      Hyperlipidemia 02/10/2010     Priority: Medium    Hypertension goal BP (blood pressure) < 140/90 12/02/2004     Priority: Medium      Past Medical History:   Diagnosis Date    AAA (abdominal aortic aneurysm)     BCC (basal cell carcinoma of skin) - face     CLL (chronic lymphocytic leukemia)     Diaphragmatic hernia     Diverticulitis of colon     Esophageal reflux     Essential hypertension     Hyperlipidemia     Irritable bowel syndrome     Macular degeneration (senile) of retina     Mumps     Squamous Cell Carcinoma, Back 1988    Type 2 diabetes mellitus      Past Surgical History:   Procedure Laterality Date    ANESTHESIA CARDIOVERSION N/A 8/2/2021    Procedure: ANESTHESIA, FOR CARDIOVERSION;  Surgeon: GENERIC ANESTHESIA PROVIDER;  Location: [facility] OR    APPENDECTOMY OPEN  1966    BONE MARROW BIOPSY, BONE SPECIMEN, NEEDLE/TROCAR N/A 11/21/2017    Procedure: BIOPSY BONE MARROW;  BONE MARROW BIOPSY;  Surgeon: Shady Garcia MD;  Location:  GI    COLONOSCOPY  2002    COLONOSCOPY N/A 7/28/2023    Procedure: COLONOSCOPY;  Surgeon: Julio Castro MD;  Location: [facility] OR    CV CORONARY ANGIOGRAM N/A 6/29/2022    Procedure: Coronary Angiogram;  Surgeon: Evaristo Beaulieu MD;  Location:  HEART CARDIAC CATH LAB    ENDOVASCULAR REPAIR ANEURYSM ABDOMINAL AORTA N/A 12/4/2017    Procedure:  ENDOVASCULAR REPAIR ANEURYSM ABDOMINAL AORTA;  ENDOVASCULAR REPAIR ANEURYSM ABDOMINAL AORTA;  Surgeon: Milton Cazares MD;  Location: [facility] OR    Fistulotomy with marsupialization for repair of fisture in ano  2007    IR ABDOMINAL AORTOGRAM  3/11/2019    IR VISCERAL EMBOLIZATION  5/13/2019    Multiple skin tags - resection  1998    Squamous cell skin cancer resection - back  1985-90    VASECTOMY       Current Outpatient Medications   Medication Sig Dispense Refill    Alcohol Swabs (ALCOHOL PREP) 70 % PADS       aspirin 81 MG EC tablet Take 1 tablet (81 mg) by mouth daily 30 tablet 1    blood glucose (NO BRAND SPECIFIED) lancets standard Use to test blood sugar 3 times daily or as directed. 100 each 3    blood glucose (NO BRAND SPECIFIED) test strip Contour Next Test Strips-Use to test blood sugar 3 times daily or as directed. 300 strip 1    Dulaglutide 4.5 MG/0.5ML SOPN Inject 4.5 mg Subcutaneous once a week 6 mL 4    HUMALOG KWIKPEN 100 UNIT/ML soln       insulin aspart (NOVOLOG PEN) 100 UNIT/ML pen Inject 1-7 Units Subcutaneous 3 times daily (before meals) Do Not give Correction Insulin if Pre-Meal BG less than 140. For Pre-Meal  - 189 give 1 unit. For Pre-Meal  - 239 give 2 units. For Pre-Meal  - 289 give 3 units. For Pre-Meal  - 339 give 4 units. For Pre-Meal - 399 give 5 units. For Pre-Meal -449 give 6 units For Pre-Meal BG greater than or equal to 450 give 7 units. 15 mL 1    insulin glargine (LANTUS PEN) 100 UNIT/ML pen Inject 10 Units Subcutaneous every morning (before breakfast) 15 mL 0    losartan (COZAAR) 25 MG tablet Take 1 tablet (25 mg) by mouth daily 90 tablet 3    metoprolol succinate ER (TOPROL XL) 100 MG 24 hr tablet Take 1 tablet (100 mg) by mouth daily 90 tablet 4    metroNIDAZOLE (FLAGYL) 500 MG tablet TAKE 1 TABLET BY MOUTH AT 1 PM, 2 PM, AND 11 PM THE DAY BEFORE SURGERY.      Microlet Lancets MISC USE TO TEST BLOOD SUGAR 3 TIMES DAILY OR AS DIRECTED.       "nitroGLYcerin (NITROSTAT) 0.4 MG sublingual tablet One tablet under the tongue every 5 minutes if needed for chest pain. May repeat every 5 minutes for a maximum of 3 doses in 15 minutes\" 25 tablet 3    ondansetron (ZOFRAN ODT) 4 MG ODT tab PLEASE SEE ATTACHED FOR DETAILED DIRECTIONS      pantoprazole (PROTONIX) 40 MG EC tablet Take 1 tablet (40 mg) by mouth daily 90 tablet 4    predniSONE (DELTASONE) 10 MG tablet Take 1 tablet (10 mg) by mouth daily 10 tablet 0    rosuvastatin (CRESTOR) 20 MG tablet Take 1 tablet (20 mg) by mouth daily 90 tablet 4       Allergies   Allergen Reactions    Ace Inhibitors Cough     cough  cough        Social History     Tobacco Use    Smoking status: Former     Packs/day: 0.50     Years: 20.00     Pack years: 10.00     Types: Cigarettes     Quit date: 1975     Years since quittin.6    Smokeless tobacco: Former   Substance Use Topics    Alcohol use: Yes     Alcohol/week: 10.0 - 14.0 standard drinks of alcohol     Types: 10 - 14 Standard drinks or equivalent per week     Comment: 3 drinks a day/wine     Family History   Problem Relation Age of Onset    Cerebrovascular Disease Father         x 2    Hypertension Mother     C.A.D. Mother     Cancer Mother         skin    Eye Disorder Mother         glaucoma    Prostate Cancer No family hx of     Cancer - colorectal No family hx of     Glaucoma No family hx of     Macular Degeneration No family hx of      History   Drug Use No         Objective     /74 (BP Location: Right arm, Patient Position: Sitting, Cuff Size: Adult Regular)   Pulse 114   Temp 99.1  F (37.3  C) (Tympanic)   Resp 20   Wt 88 kg (194 lb 1.6 oz)   SpO2 99%   BMI 28.66 kg/m      Physical Exam    GENERAL APPEARANCE: healthy, alert and no distress     EYES: EOMI,  PERRL     HENT: ear canals and TM's normal and nose and mouth without ulcers or lesions     NECK: no adenopathy, no asymmetry, masses, or scars and thyroid normal to palpation     RESP: lungs " clear to auscultation - no rales, rhonchi or wheezes     CV: regular rates and rhythm, normal S1 S2, no S3 or S4 and no murmur, click or rub     ABDOMEN:  soft, nontender, no HSM or masses and bowel sounds normal     MS: extremities normal- no gross deformities noted, no evidence of inflammation in joints, FROM in all extremities.     SKIN: no suspicious lesions or rashes     NEURO: Normal strength and tone, sensory exam grossly normal, mentation intact and speech normal     PSYCH: mentation appears normal. and affect normal/bright     LYMPHATICS: No cervical adenopathy    Recent Labs   Lab Test 08/15/23  0905 23  1059 23  2146 23  1317 23  1231 23  1015 22  0917 22  0942 22  1008   HGB 8.4* 8.5*   < >  --    < > 9.1*   < >  --  12.6*   * 145*   < >  --    < > 79*   < >  --  66*   INR  --   --   --  1.22*  --   --   --   --  1.03   * 134*   < >  --    < > 140   < > 137 137   POTASSIUM 4.6 5.0   < >  --    < > 5.1   < > 3.9 4.2   CR 1.46* 1.60*   < >  --    < > 1.51*   < > 1.35* 1.36*   A1C  --   --   --   --   --  7.1*  --  6.4*  --     < > = values in this interval not displayed.        Diagnostics:  CMP:  Stable    CBC:  HEMOGLOBIN: 7.6  CBC:  Abnormal, stable  EKG:  Done 2023.  NSR.  Echo Done: 2023:  [facility]  Echocardiography Laboratory  [address] [place] [address]     Name: MARK MORA  MRN: [id number]  : 1940  Study Date: 2023 12:59 PM  Age: 83 yrs  Gender: Male  Patient Location: [facility]  Reason For Study: CAD  Ordering Physician: SHANIA MORRISON  Referring Physician: Croke, Arlen  Performed By: Jey Koo RDCS     BSA: 2.1 m2  Height: 69 in  Weight: 198 lb  HR: 84  BP: 142/84 mmHg  ______________________________________________________________________________  Procedure  Complete Portable Echo  Adult.  ______________________________________________________________________________  Interpretation Summary     The left ventricle is normal in size.  Left ventricular systolic function is normal.  The visual ejection fraction is 55-60%.  The right ventricle is normal size.  The right ventricular systolic function is normal.  Normal left atrial size.  The ascending aorta is Moderately dilated.  The inferior vena cava was normal in size with preserved respiratory  variability.    Revised Cardiac Risk Index (RCRI):  The patient has the following serious cardiovascular risks for perioperative complications:     - Coronary Artery Disease (MI, positive stress test, angina, Qs on EKG) = 1 point   - Diabetes Mellitus (on Insulin) = 1 point     RCRI Interpretation: 2 points: Class III (moderate risk - 6.6% complication rate)           Signed Electronically by: Annette Restrepo PA-C  Copy of this evaluation report is provided to requesting physician.

## 2023-08-31 NOTE — Clinical Note
Hi, Pre-op done.  Very complex patient.  Please look over to be sure you agree with assessment.  Thank you.

## 2023-08-31 NOTE — TELEPHONE ENCOUNTER
Called Dr. Eastman at [phone] (back up POD)   - Informed Dr. Eastman that the patient had a pre-op completed today (8/31/2023) with Annette Restrepo PA-C for his upcoming Laparoscopic Extended Right Colectomy procedure that is scheduled for 9/8/2023   - Informed Dr. Eastman of the patient's critical hemoglobin lab value of 7.7   - Dr. Eastman states that the patient does not meet transfusion threshold until his hemoglobin drops below 7   - Dr. Eastman recommending that the writer call the patient and see how he is feeling/see if he is symptomatic   - Dr. Eastman states that if the patient is symptomatic, then needs to be seen at the ED   - Dr. Eastman states that if the patient is asymptomatic, then the patient can come back to the clinic in the morning for a STAT Hemoglobin recheck   - Dr. Eastman states that Dr. Kauffman (patient's PCP) does not want to transfuse the patient for a hemoglobin until it is less than 7     Called the patient at [phone] (home number) and [phone] (mobile number) and left a message requesting a call back   - Provided PAL RN direct line   - Awaiting a call back at this time     When the patient calls back:   - Ask the patient how he is feeling and determine if he is experiencing any symptoms, such as dizziness, lightheadedness, weakness   - Per Dr. Eastman, if the patient is symptomatic, then Dr. Eastman recommends that the patient be seen at the ED   - Per Dr. Eastman, if the patient is asymptomatic, then the patient can come back to the clinic in the morning for a STAT Hemoglobin recheck   - If patient is asymptomatic, please ensure that the patient will come in for a hemoglobin lab recheck first thing in the morning     Routing to Dr. Kauffman (patient's PCP), Annette Restrepo PA-C (ordering provider), and the patient's PAL RN to update on the plan of care.     Daniela RODRIGUES RN   [facility]

## 2023-08-31 NOTE — TELEPHONE ENCOUNTER
Received a call from Deniz  at the [facility]   - Sloan Guaman is reporting a critical lab value   - Deniz  states that the patient's hemoglobin resulted at 7.7     Routing high priority to Annette Restrepo PA-C (ordering provider) and to Dr. Eastman (PM back up POD) to please review and advise.     Daniela RODRIGUES RN   [facility]

## 2023-08-31 NOTE — PATIENT INSTRUCTIONS
For informational purposes only. Not to replace the advice of your health care provider. Copyright   2003,  [facility] [employer]. All rights reserved. Clinically reviewed by Chanel Rubin MD. Vitals ([email or web address]) [id number] - REV .  Preparing for Your Surgery  Getting started  A nurse will call you to review your health history and instructions. They will give you an arrival time based on your scheduled surgery time. Please be ready to share:  Your doctor's clinic name and phone number  Your medical, surgical, and anesthesia history  A list of allergies and sensitivities  A list of medicines, including herbal treatments and over-the-counter drugs  Whether the patient has a legal guardian (ask how to send us the papers in advance)  Please tell us if you're pregnant--or if there's any chance you might be pregnant. Some surgeries may injure a fetus (unborn baby), so they require a pregnancy test. Surgeries that are safe for a fetus don't always need a test, and you can choose whether to have one.   If you have a child who's having surgery, please ask for a copy of Preparing for Your Child's Surgery.    Preparing for surgery  Within 10 to 30 days of surgery: Have a pre-op exam (sometimes called an H&P, or History and Physical). This can be done at a clinic or pre-operative center.  If you're having a , you may not need this exam. Talk to your care team.  At your pre-op exam, talk to your care team about all medicines you take. If you need to stop any medicines before surgery, ask when to start taking them again.  We do this for your safety. Many medicines can make you bleed too much during surgery. Some change how well surgery (anesthesia) drugs work.  Call your insurance company to let them know you're having surgery. (If you don't have insurance, call [phone].)  Call your clinic if there's any change in your health. This includes signs of a cold or flu (sore throat, runny nose, cough, rash, fever). It also  includes a scrape or scratch near the surgery site.  If you have questions on the day of surgery, call your hospital or surgery center.  Eating and drinking guidelines  For your safety: Unless your surgeon tells you otherwise, follow the guidelines below.  Eat and drink as usual until 8 hours before you arrive for surgery. After that, no food or milk.  Drink clear liquids until 2 hours before you arrive. These are liquids you can see through, like water, Gatorade, and Propel Water. They also include plain black coffee and tea (no cream or milk), candy, and breath mints. You can spit out gum when you arrive.  If you drink alcohol: Stop drinking it the night before surgery.  If your care team tells you to take medicine on the morning of surgery, it's okay to take it with a sip of water.  Preventing infection  Shower or bathe the night before and morning of your surgery. Follow the instructions your clinic gave you. (If no instructions, use regular soap.)  Don't shave or clip hair near your surgery site. We'll remove the hair if needed.  Don't smoke or vape the morning of surgery. You may chew nicotine gum up to 2 hours before surgery. A nicotine patch is okay.  Note: Some surgeries require you to completely quit smoking and nicotine. Check with your surgeon.  Your care team will make every effort to keep you safe from infection. We will:  Clean our hands often with soap and water (or an alcohol-based hand rub).  Clean the skin at your surgery site with a special soap that kills germs.  Give you a special gown to keep you warm. (Cold raises the risk of infection.)  Wear special hair covers, masks, gowns and gloves during surgery.  Give antibiotic medicine, if prescribed. Not all surgeries need antibiotics.  What to bring on the day of surgery  Photo ID and insurance card  Copy of your health care directive, if you have one  Glasses and hearing aids (bring cases)  You can't wear contacts during surgery  Inhaler and eye  drops, if you use them (tell us about these when you arrive)  CPAP machine or breathing device, if you use them  A few personal items, if spending the night  If you have . . .  A pacemaker, ICD (cardiac defibrillator) or other implant: Bring the ID card.  An implanted stimulator: Bring the remote control.  A legal guardian: Bring a copy of the certified (court-stamped) guardianship papers.  Please remove any jewelry, including body piercings. Leave jewelry and other valuables at home.  If you're going home the day of surgery  You must have a responsible adult drive you home. They should stay with you overnight as well.  If you don't have someone to stay with you, and you aren't safe to go home alone, we may keep you overnight. Insurance often won't pay for this.  After surgery  If it's hard to control your pain or you need more pain medicine, please call your surgeon's office.  Questions?   If you have any questions for your care team, list them here: _________________________________________________________________________________________________________________________________________________________________________ ____________________________________ ____________________________________ ____________________________________    How to Take Your Medication Before Surgery  - Take all of your medications before surgery except as noted below

## 2023-09-01 NOTE — PROGRESS NOTES
Patient was having dysuria in infusion and UA was obtained.  Returned with positive nitrite, large blood, few bacteria and urine culture pending.  Spoke to the patient he is afebrile, no flank pain and no prostate concerns.  Will prescribe Macrobid 100 mg twice daily x7 days.  Prescription sent to his pharmacy.

## 2023-09-01 NOTE — TELEPHONE ENCOUNTER
I spoke with patient and his wife.  They did see the Hgb result last night.  Patient reports that he is feeling more dizzy, lightheaded and weak than he usually does.  Discussed with them that Dr Eastman recommends he to to the ER if he is having these symptoms.    Patient and spouse are declining going to the ER because they have an Iron infusion today at 2.  Discussed with Dr. Kauffman.  She advises-  Patient come to the clinic to get stat Hgb drawn this am.  If Hgb result is not <7, patient may proceed with the Iron infusion at 2 pm  I called Benji and Marcie back and gave them this information.   I have scheduled them at [facility] at 0930 for the lab draw.  They are in agreement with this plan, and will make the appointment.  No further questions.  Will call back if any other questions or concerns.   QUIRINO Acevedo RN

## 2023-09-01 NOTE — TELEPHONE ENCOUNTER
Annette-  Spouse is asking for medication directions before surgery next week.   She states she was told she would get a call regarding this?   Please advise.   Surgery is 09/08 and patient was told she needs to stop some medications five days prior to surgery.  Dr. Frankel the surgeon said to stop all vitamins today.  Stop blood thinners five days prior to surgery.  He said to check with PCP regarding other medications.  Patient has an Iron infusion at 1400 today.  Please leave a detailed message with directions.      Per Dr. Joya Leblanc's hgb is 7.4 so ok to continue to iron infusion.  He doesn't need to go to the ER if Hgb is not <7.    I called patient and spouse and advised them of this information.  Dr. Kauffman is recommending rechecking the Hgb on Tuesday.  Order placed  Appointment scheduled-approved by lab.  Advised them that if patient notes increased weakness, dizziness or lightheadedness over the weekend, has difficulty standing due to weakness, he will need to go to the ER for evaluation.  They voice understanding of this instruction.    They are asking about moving the surgery up.  I advised them that they would need to contact the surgeon's office to see if this is a possibility.    Please call patient on Tuesday am around 8-9 for an update.  They are comfortable with this plan.  No further questions.  Will call back if any other questions or concerns.   QUIRINO Acevedo RN

## 2023-09-01 NOTE — PROGRESS NOTES
Infusion Nursing Note:  Austin Garza presents today for venofer #1 of 3.    Patient seen by provider today: No   present during visit today: Not Applicable.    Note: Pt complains of burning with urination and urgency that is new- therefore talked with LUIZ Scales, UA ordered and sent.    Pt oriented to infusion center and given patient education about venofer.     Intravenous Access:  Peripheral IV placed.    Treatment Conditions:  Not Applicable.      Post Infusion Assessment:  Patient tolerated infusion without incident.  Blood return noted pre and post infusion.  Site patent and intact, free from redness, edema or discomfort.  No evidence of extravasations.  Access discontinued per protocol.       Discharge Plan:   Discharge instructions reviewed with: Patient.  Patient and/or family verbalized understanding of discharge instructions and all questions answered.  AVS to patient via [employer] by Chantilly LaceT.  Patient will return 9/15/23 for next appointment.   Patient discharged in stable condition accompanied by: wife.  Departure Mode: Ambulatory.      Kari Schoen, RN

## 2023-09-05 NOTE — TELEPHONE ENCOUNTER
I spoke with spouse, Marcie, this am and she reports that patient did well this weekend.  No increased dizziness or lightheadedness.   States that overall they had a good weekend.  Benji is scheduled for Hgb recheck today.  Marcie states that she did receive a call from LUIZ Link clarifying what was discussed at office appointment regarding medications to hold prior to surgery.     She was told by Annette that someone would call her today with directions on how to take the insulin.  No further questions.  Will call back if any other questions or concerns.   QUIRINO Acevedo RN

## 2023-09-05 NOTE — TELEPHONE ENCOUNTER
Spouse, Marcie, is calling for clarification on the insulin dosing for surgery on 09/08.  She understands that patient  should not take the short-acting insulin (Novolog) when patient is fasting.  Patient takes the Dulaglutide once weekly on Mondays.  She doesn't recall what the direction was for the daily Lantus dosing?  I can call her back with directions after your review.  Thank you.  QUIRINO Acevedo RN

## 2023-09-05 NOTE — PROGRESS NOTES
Elizabeth Hi, Letty Mojica, RN  Letty,    I was asked to order UA for Benji in infusion last week, but haven't met him.  is sensitive to Macrobid, which is good. Can you please call and check in with him?    Thanks, Elizabeth    Writer tried calling Benji to see how he's feeling. Voicemail left. Will await return call.     Letty Arevalo RN on 9/5/2023 at 12:06 PM

## 2023-09-05 NOTE — PHARMACY-ADMISSION MEDICATION HISTORY
" Med rec completed by preadmitting RN -     Reviewed by Godfrey Junior, RN (Registered Nurse) on 09/01/23 at 1138     Per fill history using \"humalog\" but appears rx written for novolog so will leave both entries as preadmit RN had them.      No clarifications noted   Medication History Completed By: Chandrika LESLIE Castro [facility] 9/5/2023 2:43 PM    Prior to Admission medications    Medication Sig Last Dose Taking? Auth Provider Long Term End Date   HUMALOG KWIKPEN 100 UNIT/ML soln   Yes Reported, Patient No    metroNIDAZOLE (FLAGYL) 500 MG tablet TAKE 1 TABLET BY MOUTH AT 1 PM, 2 PM, AND 11 PM THE DAY BEFORE SURGERY.  Yes Reported, Patient     Microlet Lancets MISC USE TO TEST BLOOD SUGAR 3 TIMES DAILY OR AS DIRECTED.  Yes Reported, Patient     ondansetron (ZOFRAN ODT) 4 MG ODT tab PLEASE SEE ATTACHED FOR DETAILED DIRECTIONS  Yes Reported, Patient     Alcohol Swabs (ALCOHOL PREP) 70 % PADS    Reported, Patient     aspirin 81 MG EC tablet Take 1 tablet (81 mg) by mouth daily   Андрей Velázquez MD     blood glucose (NO BRAND SPECIFIED) lancets standard Use to test blood sugar 3 times daily or as directed.   Luma Kauffman MD     blood glucose (NO BRAND SPECIFIED) test strip Contour Next Test Strips-Use to test blood sugar 3 times daily or as directed.   Luma Kauffman MD     Dulaglutide 4.5 MG/0.5ML SOPN Inject 4.5 mg Subcutaneous once a week   Luma Kauffman MD     insulin aspart (NOVOLOG PEN) 100 UNIT/ML pen Inject 1-7 Units Subcutaneous 3 times daily (before meals) Do Not give Correction Insulin if Pre-Meal BG less than 140. For Pre-Meal  - 189 give 1 unit. For Pre-Meal  - 239 give 2 units. For Pre-Meal  - 289 give 3 units. For Pre-Meal  - 339 give 4 units. For Pre-Meal - 399 give 5 units. For Pre-Meal -449 give 6 units For Pre-Meal BG greater than or equal to 450 give 7 units.   Андрей Velázquez MD Yes    insulin glargine (LANTUS PEN) 100 UNIT/ML pen Inject 10 Units " "Subcutaneous every morning (before breakfast)   Андрей Velázquez MD Yes    losartan (COZAAR) 25 MG tablet Take 1 tablet (25 mg) by mouth daily   Juwan Bah MD Yes    metoprolol succinate ER (TOPROL XL) 100 MG 24 hr tablet Take 1 tablet (100 mg) by mouth daily   Luma Kauffman MD Yes    nitroFURantoin macrocrystal-monohydrate (MACROBID) 100 MG capsule Take 1 capsule (100 mg) by mouth 2 times daily for 7 days   Jerome Elizabeth Oseguera PA-C  9/8/23   nitroGLYcerin (NITROSTAT) 0.4 MG sublingual tablet One tablet under the tongue every 5 minutes if needed for chest pain. May repeat every 5 minutes for a maximum of 3 doses in 15 minutes\"   Evaristo Beaulieu MD Yes    pantoprazole (PROTONIX) 40 MG EC tablet Take 1 tablet (40 mg) by mouth daily   Luma Kauffman MD     predniSONE (DELTASONE) 10 MG tablet Take 1 tablet (10 mg) by mouth daily   Sandi Eastman MD     rosuvastatin (CRESTOR) 20 MG tablet Take 1 tablet (20 mg) by mouth daily   Luma Kauffman MD Yes        "

## 2023-09-06 NOTE — TELEPHONE ENCOUNTER
For the Lantus, The patient should take 80% of the usual evening dose the night before surgery OR on the morning dose the day of surgery.

## 2023-09-06 NOTE — PROGRESS NOTES
"Writer spoke to Benji's wife Marcie as Benji was currently taking a shower. Marcie states that Benji is feeling much better. His urinary symptoms are resolving. \"Right now he's focusing on abdominal surgery that is scheduled on 09/08/23.    Letty Arevalo RN on 9/6/2023 at 12:18 PM    "

## 2023-09-07 NOTE — TELEPHONE ENCOUNTER
Received call from [facility] requesting pre-op be completed and signed from 8/31/23. Please review and complete. Patient is scheduled for surgery tomorrow.     Jose Ramon GREEN RN 9/7/2023 at 11:39 AM

## 2023-09-07 NOTE — TELEPHONE ENCOUNTER
Marcie, spouse, called back inquiring about Lantus dosing prior to surgery and today as patient will be doing prep for colon surgery, and is on a clear liquid diet today.  He will be doing the Miralax/Gatorade prep this afternoon.   She has purchased regular Gatorade.  FBS this am is 188.  Lab Results   Component Value Date    A1C 7.1 07/07/2023    A1C 6.4 08/02/2022     I discussed with Dr. Kauffman and she recommends that patient take 5 units of Lantus in the am the day of surgery (surgery is scheduled on 09/08/23), and take 5 units of Lantus this am.   (Patient usually takes the Lantus at 0930)  Patient should not take Novolog the day of surgery as he will be NPO.  Patient should continue to take Novolog insulin per direction today.  Discussed this with Marcie, and she wrote these directions down.  Voices understanding of instructions.  No further questions.  Will call back if any other questions or concerns.  QUIRINO Acevedo RN

## 2023-09-08 PROBLEM — C18.4 CANCER OF TRANSVERSE COLON (H): Status: ACTIVE | Noted: 2023-01-01

## 2023-09-08 NOTE — PROGRESS NOTES
Patient's blood glucose was 190 pre op. Tram updated and aware. Pt states that his normal numbers average around 180.    Vianney Louis RN on 9/8/2023 at 12:10 PM

## 2023-09-08 NOTE — OP NOTE
PREOPERATIVE DIAGNOSIS: Transverse colon cancer     POSTOPERATIVE DIAGNOSIS: Same    OPERATION PERFORMED.  1. Diagnostic laparoscopy  2. Open extended right colectomy with ileocolic anastomosis for curative intent   3. Full mobilization of the splenic flexure    SURGEON:  Vanessa Frankel MD     ASSISTANT:  Daniel Morgan M.D., Colon and Rectal Surgery Fellow   Beth Tovar, PAC    ANESTHESIA:  General.    INDICATIONS:  Austin Garza is a 83 year old man who presented with anemia.  He does have a history of an advanced polyp in his transverse colon, which unfortunately due to COVID he did not have endoscopically removed.  He underwent a colonoscopy on July 28 which demonstrated a mass in the mid transverse colon, and biopsies were consistent with an adenocarcinoma.  Staging evaluation was significant for no evidence of metastatic spread of disease, with enlarged lymph nodes in the mesentery. CEA was 18.9. Based on these findings, the patient was recommended to undergo a laparoscopic, possible open extended right colectomy.  As he remained anemic, he was transfused 1 unit of packed red blood cells preoperatively. The risks and benefits of this procedure were discussed, and Benji has agreed to proceed.    OPERATIVE FINDINGS: Adhesions between the terminal ileum and the cecum, small bowel adhesions to the transverse colon mesentery, tattoo in the mid transverse colon with a palpable mass proximal to the tattoo consistent with the patient's known malignancy.  Enlarged abdominal aortic aneurysm.  No evidence of intra-abdominal spread of disease.    PROCEDURE IN DETAIL:  After informed consent was obtained, the patient was brought to the operating room. They were placed in the supine position on the operating room table.  Sequential compression devices were placed on the lower extremities prior to induction, and general anesthesia was induced without difficulty.  A Siddiqui catheter was inserted and IV antibiotics  administered.  The abdomen was sterilely prepped and draped in the usual fashion.    An infraumbilical incision was made, and dissection carried down to the level of the fascia, and we attempted a Cornad technique to enter the abdomen.  We encountered a significant amount of preperitoneal fat, and were unable to safely enter the abdomen.  We then extended our incision periumbilical, and again were unable to clearly identify the peritoneum to enter the abdominal cavity.  We then made a small incision in the left upper quadrant, and entered the abdomen using a Veress technique.  The abdomen was insufflated to 15 mmHg which the patient tolerated well.  We then inserted a 10 mm trocar using a Visiport technique into the left upper quadrant.  Diagnostic laparoscopy then ensued.  Tattoo was seen in the mid transverse colon, and since significant preperitoneal fat at her previous attempted periumbilical entry site.  We then performed a laparoscopic tap block with a total of 30 mils of 0.5% Marcaine in 4 quadrants.  2 additional 5 mm trocars were then placed in the left lower and left mid abdomen under direct visualization.  A 10 mm trocar was placed at the umbilicus.  We then continued with our diagnostic laparoscopy.  The cecum was densely adherent to the abdominal sidewall, and his large abdominal aortic aneurysm created significant distortion of the transverse and ileocolic mesentery.  There were also small bowel adhesions to the transverse mesentery, as well as multiple small bowel adhesions in the right lower quadrant on the terminal ileum.  Given the distorted anatomy, the decision was made to convert to an open operation.      A midline incision was made in the skin with a #10 blade and was carried down through the subcutaneous tissue and fat with Bovie electrocautery to reach the midline abdominal fascia.  The fascia was carefully incised with cautery.  The peritoneum was swept bilaterally and all adhesions were  lysed.  There was no evidence of peritoneal disease.  An Dutch wound protector was inserted and the Bookwalter retractor used for abdominal retraction throughout the case.  The small bowel was packed into the left lower quadrant exposing the right colon, transverse colon and terminal ileum mesentery.  The operative findings are noted above.      We started by performing a lateral to medial dissection. We began by mobilizing the omentum off of the transverse colon entering the lesser sac.  We extended this dissection around the hepatic flexure and down the white line of Toldt, taking down the lateral peritoneal attachments. The cecum was elevated out of the pelvis, and the peritoneum along the terminal ileum and cecum divided to meet our dissection plane.  There were multiple small bowel adhesions in the terminal ileum which were lysed with a combination of sharp dissection and cautery.  We were eventually able to identify the terminal ileum entering into the cecum.  There was also adhesions between the ascending colon and transverse colon which were taken down with a combination of sharp dissection and cautery.  With mobilization of these adhesions, we were able to clearly identify anatomy to complete our medialization of the colon.  We then continued this dissection around the hepatic flexure, and mobilized the omentum off of the transverse colon to the level of the splenic flexure.  His tumor was palpable in the mid transverse colon just proximal to the middle colic vessels, with a tattoo distal.  Given that our ileocolic anastomosis was would be distal to the middle colic vessels, we then proceeded with full mobilization of the splenic flexure.  Once the ascending and transverse colon were fully mobilized, we then made a window in the mesentery of the distal terminal ileum at our selected transection point, and used the LigaSure to divide the terminal ileum and colon mesentery up to the level of the ileocolic  vessels.  We then repeated this at our distal transsection point by making a window in the mesentery in the distal transverse colon, and divided the intervening mesentery with good hemostasis. The duodenum was protected throughout this dissection and at the end of the mesenteric transection hemostasis was evident.     We then proceeded with creating our ileocolic anastomosis. The terminal ileum was brought into close proximity to the transverse colon for a side-to-side functional end-to-end anastomosis.  An enterotomy was made in the antimesenteric side of the terminal ileum and transverse colon, and a side-to-side functional end-to-end anastomosis was constructed using a single fire of the HEATH 75-mm stapler. Two 3-0 Vicryl sutures were placed at the crotch of the staple line to avoid tension.  The staple hole was then closed using a TA90 linear stapler.  The specimen was removed from the operative field and submitted for pathological analysis. The TA staple line was inspected and was noted to be hemostatic.  Two 3-0 Vicryl sutures were placed at the crotch of the anastomosis, and the common staple line was then oversewn and inverted using interrupted 3-0 Vicryl Lembert sutures. The anastomosis was returned to the abdomen.     The abdomen was irrigated with a copious amount of saline solution and sucked dry.  Hemostasis was adequate.  The remaining omentum was brought down over the small bowel and the anastomosis prior to closure. The midline abdominal fascia was closed using a running #0 looped PDS suture from either end. The subcutaneous tissue was irrigated with saline solution and the skin was closed with a running 4-0 Monocryl suture, and the skin was dressed with surgical glue.      The patient tolerated the procedure well without complications.  At the end of the procedure, all needle sponge and instrument counts were correct.  The patient was extubated in the operating room and taken to the recovery room in  stable condition.    EBL: 100 ml  SPECIMEN: terminal ileum with right and transverse colon, omentum  COMPLICATIONS: none

## 2023-09-08 NOTE — ANESTHESIA CARE TRANSFER NOTE
Patient: Austin Garza    Procedure: Procedure(s):  Laparoscopic Extended Right Colectomy converted to open       Diagnosis: Cancer, colon (H) [C18.9]  Diagnosis Additional Information: No value filed.    Anesthesia Type:   General     Note:    Oropharynx: oropharynx clear of all foreign objects  Level of Consciousness: awake and drowsy  Oxygen Supplementation: face mask  Level of Supplemental Oxygen (L/min / FiO2): 6  Independent Airway: airway patency satisfactory and stable  Dentition: dentition unchanged  Vital Signs Stable: post-procedure vital signs reviewed and stable  Report to RN Given: handoff report given  Patient transferred to: PACU    Handoff Report: Identifed the Patient, Identified the Reponsible Provider, Reviewed the pertinent medical history, Discussed the surgical course, Reviewed Intra-OP anesthesia mangement and issues during anesthesia, Set expectations for post-procedure period and Allowed opportunity for questions and acknowledgement of understanding      Vitals:  Vitals Value Taken Time   /69 09/08/23 1735   Temp     Pulse 88 09/08/23 1736   Resp 10 09/08/23 1736   SpO2 100 % 09/08/23 1736   Vitals shown include unvalidated device data.    Electronically Signed By: GISELL Strickland CRNA  September 8, 2023  5:37 PM

## 2023-09-08 NOTE — PROGRESS NOTES
Dr. Tram maldonado 5000 Heparin pre op.    Vianney Louis RN on 9/8/2023 at 11:03 AM      Pt mentioned he was diagnosed with UTI a week ago and taking ABX. Reports that he finishes his course of ABX tonight.    Vianney Louis RN on 9/8/2023 at 11:03 AM

## 2023-09-08 NOTE — ANESTHESIA PROCEDURE NOTES
Airway       Patient location during procedure: OR       Procedure Start/Stop Times: 9/8/2023 1:20 PM  Staff -        CRNA: Sandi Cochran APRN CRNA       Performed By: CRNAIndications and Patient Condition       Indications for airway management: arnoldo-procedural       Induction type:intravenous       Mask difficulty assessment: 1 - vent by mask    Final Airway Details       Final airway type: endotracheal airway       Successful airway: ETT - single  Endotracheal Airway Details        ETT size (mm): 8.0       Cuffed: yes       Cuff volume (mL): 7       Successful intubation technique: video laryngoscopy       VL Blade Size: Glidescope 3       Grade View of Cords: 1       Position: Center       Measured from: lips       Secured at (cm): 25       Bite block used: None    Post intubation assessment        Placement verified by: capnometry, equal breath sounds and chest rise        Number of attempts at approach: 1       Secured with: plastic tape       Ease of procedure: easy       Dentition: Intact and Unchanged    Medication(s) Administered   Medication Administration Time: 9/8/2023 1:20 PM

## 2023-09-08 NOTE — BRIEF OP NOTE
[facility]    Brief Operative Note    Pre-operative diagnosis: Transverse colon cancer  Post-operative diagnosis Same as pre-operative diagnosis    Procedure: Procedure(s):  Laparoscopic Extended Right Colectomy converted to open  Surgeon: Surgeon(s) and Role:     * Vanessa Frankel MD - Primary     * Beth Tovar PA-C - Assisting  Daniel Morgan MD, Fellow - Assisting  Anesthesia: General   Estimated Blood Loss: 100cc    Drains: None  Specimens:   ID Type Source Tests Collected by Time Destination   1 : TERMINAL ILLEUM, RIGHT AND TRANSVERSE COLON WITH OMENTUM Resection Large Intestine, Colon SURGICAL PATHOLOGY EXAM Vanessa Frankel MD 9/8/2023  4:40 PM      Findings:   Palpable tumor in mid transverse colon with desmoplastic reaction surrounding, resulting in adhesions to nearby omentum and small bowel mesentery. Firm fullness appreciated in RP vs small bowel mesentery that ultimately was the patient's AAA and was distinct from the colon mass. Tattoo distal to tumor. Additional adhesions between terminal ileum and cecum. ~25cm of terminal ileum resected en bloc with right colon and about 2/3 of the transverse colon. Stapled side-to-side functional end-to-end anastomosis created.    Complications: None.  Implants: * No implants in log *        IVF: 1L crystalloid, 500cc colloid, 1 unit pRBC which was initiated in preop and finished during the case  UOP: 200cc urine  Condition on discharge from OR: Satisfactory    Daniel Morgan MD   Fellow  [facility]   [phone].        ADDENDUM:    PATIENT DATA  Indicate Y or N:  Home O2 No  Hemodialysis  No  Transplant patient  No  Cirrhosis  No  Steroids in last 30 days  No  Immunomodulators in last 30 days  No  Anticoagulation at time of surgery  Yes   List medication : aspirin  Prior abdominal surgery  Yes  Pelvic irradiation  No    Albumin within 30 days if known : unknown   Hgb within 30 days if known     Hemoglobin   Date Value Ref Range Status   09/08/2023 8.5 (L) 13.3 - 17.7 g/dL Final   06/01/2021 13.5 13.3 - 17.7 g/dL Final   ]  Cr within 30 days if known    Creatinine   Date Value Ref Range Status   09/08/2023 1.48 (H) 0.67 - 1.17 mg/dL Final   06/01/2021 1.51 (H) 0.66 - 1.25 mg/dL Final   ]  Body mass index is 27.71 kg/m .      OR DATA  Emergent  No   <24 hours  No   <1 week  No  Bowel Prep Yes  Antibiotics  Yes  DVT prophylaxis    Heparin  Yes   SCD  Yes   None  No  Drain  No  ASA (1,2,3,4) 3  OR time (min) 218 mins  Stents  No  Transfuse >/= 2U  No  Anastomosis   Stapled  Yes   Handsewn  No  Leak Test    Positive  No   Negative  No   Not done  Yes    FOR CANCER ALSO COMPLETE:  Preoperative treatment (Y or N)   Chemo  No   Radiation No   Diversion  No  Preoperative Stage   CT  Yes   MRI No   US  No   Clinical  Yes   Unknown  No   T stage (1,2,3,4) unknown   N stage (0,1,2) unknown   M stage (0,1) 0  CEA 18.9  Metastatic disease at time of operation  No

## 2023-09-08 NOTE — PROGRESS NOTES
Marlys and Tram updated about recent UTI diagnosis within the last week. Macrobid abx last dose today. Both are updated and aware.    Tram still wants to infuse RBC. Marlys okay if MDA okay.    Vianney Louis RN on 9/8/2023 at 11:53 AM\

## 2023-09-08 NOTE — ANESTHESIA PREPROCEDURE EVALUATION
Anesthesia Pre-Procedure Evaluation    Patient: Austin Garza   MRN: [id number] : 1940        Procedure : Procedure(s):  Laparoscopic Extended Right Colectomy          Past Medical History:   Diagnosis Date    AAA (abdominal aortic aneurysm)     BCC (basal cell carcinoma of skin) - face     CLL (chronic lymphocytic leukemia)     Diaphragmatic hernia     Diverticulitis of colon     Esophageal reflux     Essential hypertension     Hyperlipidemia     Irritable bowel syndrome     Macular degeneration (senile) of retina     Mumps     Squamous Cell Carcinoma, Back     Type 2 diabetes mellitus       Past Surgical History:   Procedure Laterality Date    ANESTHESIA CARDIOVERSION N/A 2021    Procedure: ANESTHESIA, FOR CARDIOVERSION;  Surgeon: GENERIC ANESTHESIA PROVIDER;  Location: [facility] OR    APPENDECTOMY OPEN  1966    BONE MARROW BIOPSY, BONE SPECIMEN, NEEDLE/TROCAR N/A 2017    Procedure: BIOPSY BONE MARROW;  BONE MARROW BIOPSY;  Surgeon: Shady Garcia MD;  Location:  GI    COLONOSCOPY      COLONOSCOPY N/A 2023    Procedure: COLONOSCOPY;  Surgeon: Julio Castro MD;  Location:  OR    CV CORONARY ANGIOGRAM N/A 2022    Procedure: Coronary Angiogram;  Surgeon: Evaristo Beaulieu MD;  Location:  [facility]    ENDOVASCULAR REPAIR ANEURYSM ABDOMINAL AORTA N/A 2017    Procedure: ENDOVASCULAR REPAIR ANEURYSM ABDOMINAL AORTA;  ENDOVASCULAR REPAIR ANEURYSM ABDOMINAL AORTA;  Surgeon: Milton Cazares MD;  Location:  OR    Fistulotomy with marsupialization for repair of fisture in ano      IR ABDOMINAL AORTOGRAM  3/11/2019    IR VISCERAL EMBOLIZATION  2019    Multiple skin tags - resection  1998    Squamous cell skin cancer resection - back      VASECTOMY        Allergies   Allergen Reactions    Seasonal Allergies       Social History     Tobacco Use    Smoking status: Former     Packs/day: 0.50     Years: 20.00     Pack years: 10.00      Types: Cigarettes, Pipe     Quit date: 1975     Years since quittin.7    Smokeless tobacco: Former   Substance Use Topics    Alcohol use: Yes     Alcohol/week: 10.0 - 14.0 standard drinks of alcohol     Types: 10 - 14 Standard drinks or equivalent per week     Comment: cocktail on weekend and glass of wine almost daily      Wt Readings from Last 1 Encounters:   23 85.1 kg (187 lb 11.2 oz)        Anesthesia Evaluation            ROS/MED HX  ENT/Pulmonary:       Neurologic:       Cardiovascular: Comment: Cardiomyopathy, EF has normalized in the past year      (+)  hypertension- -  CAD (4 vessel stenting at [facility] , still having anginal symptoms, possibly due to recent GI bleed and anemia) -  - -                        dysrhythmias, a-fib,             METS/Exercise Tolerance:     Hematologic: Comments: Anemia, thrombocytopenia    (+)      anemia,          Musculoskeletal:       GI/Hepatic:       Renal/Genitourinary:     (+) renal disease, type: CRI,            Endo:     (+)  type II DM, Last HgA1c: 7.1,                   Psychiatric/Substance Use:       Infectious Disease:       Malignancy:   (+) Malignancy (CLL and colon mass), History of GI and Lymphoma/Leukemia.    Other:            Physical Exam    Airway        Mallampati: II   TM distance: > 3 FB   Neck ROM: full   Mouth opening: > 3 cm    Respiratory Devices and Support         Dental           Cardiovascular   cardiovascular exam normal          Pulmonary   pulmonary exam normal                OUTSIDE LABS:  CBC:   Lab Results   Component Value Date    WBC 13.0 (H) 2023    WBC 14.4 (H) 2023    HGB 8.5 (L) 2023    HGB 7.3 (LL) 2023    HCT 28.0 (L) 2023    HCT 25.9 (L) 2023    PLT 96 (L) 2023    PLT 77 (L) 2023     BMP:   Lab Results   Component Value Date     (L) 2023     (L) 08/15/2023    POTASSIUM 4.2 2023    POTASSIUM 4.6 08/15/2023    CHLORIDE 98 2023     CHLORIDE 100 08/15/2023    CO2 21 (L) 08/31/2023    CO2 26 08/15/2023    BUN 28.1 (H) 08/31/2023    BUN 27.2 (H) 08/15/2023    CR 1.48 (H) 08/31/2023    CR 1.46 (H) 08/15/2023     (H) 08/31/2023     (H) 08/15/2023     COAGS:   Lab Results   Component Value Date    PTT 26 05/13/2019    INR 1.22 (H) 07/26/2023     POC:   Lab Results   Component Value Date     (H) 08/24/2020     HEPATIC:   Lab Results   Component Value Date    ALBUMIN 3.7 08/31/2023    PROTTOTAL 6.2 (L) 08/31/2023    ALT 27 08/31/2023    AST 21 08/31/2023    ALKPHOS 64 08/31/2023    BILITOTAL 0.4 08/31/2023     OTHER:   Lab Results   Component Value Date    LACT 0.9 07/28/2023    A1C 7.1 (H) 07/07/2023    MAGDALENO 8.7 (L) 08/31/2023    MAG 1.9 08/02/2021    TSH 1.94 06/22/2022    CRP 5.6 01/07/2022    SED 86 (H) 07/26/2023       Anesthesia Plan    ASA Status:  3    NPO Status:  NPO Appropriate    Anesthesia Type: General.     - Airway: ETT   Induction: Intravenous.   Maintenance: Balanced.        Consents    Anesthesia Plan(s) and associated risks, benefits, and realistic alternatives discussed. Questions answered and patient/representative(s) expressed understanding.     - Discussed:     - Discussed with:  Patient            Postoperative Care    Pain management: IV analgesics, Oral pain medications, Multi-modal analgesia.   PONV prophylaxis: Ondansetron (or other 5HT-3), Dexamethasone or Solumedrol     Comments:                Ailyn Ugalde MD

## 2023-09-09 NOTE — PLAN OF CARE
To Do:  End of Shift Summary  For vital signs and complete assessments, please see documentation flowsheets.    Vitals are Temp: 98.3  F (36.8  C) Temp src: Oral BP: (!) 140/78 Pulse: 89   Resp: 12 SpO2: 96 %.     Pertinent assessments: A&Ox4, VSS on RA. Capno monitoring. Reporting pain 2/10- given tylenol as scheduled. PCA in place. Abd binder in place with ice for comfort. 3 lap sites, 1 incision with glue open to air. Pt is hard of hearing. Siddiqui in place. LR at 75cc/hr.   Major Shift Events arrived to unit  Treatment Plan: Monitor vitals, ACHS, pain control. Formal hospitalist consult tomorrow  Bedside Nurse: Munir Peters RN

## 2023-09-09 NOTE — DISCHARGE INSTRUCTIONS
Diabetes Meals and Learning     [employer] 50+ Senior Expo  Thursday, October 5, 2023  3:00 - 6:00 p.m.  [facility]  [address]   Has virtual free classes on their website   [email or web address]    Taking Control of Your Diabetes   Recipes located online   [email or web address]    ADA   Education on diabetes   [email or web address]

## 2023-09-09 NOTE — CONSULTS
Care Management Initial Consult    General Information  Assessment completed with: Patient, Spouse or significant other, Patient and spouse  Type of CM/SW Visit: Initial Assessment    Primary Care Provider verified and updated as needed: Yes   Readmission within the last 30 days: no previous admission in last 30 days      Reason for Consult: discharge planning  Advance Care Planning: Advance Care Planning Reviewed: present on chart          Communication Assessment  Patient's communication style: spoken language (English or Bilingual)    Hearing Difficulty or Deaf: yes   Wear Glasses or Blind: yes    Cognitive  Cognitive/Neuro/Behavioral: WDL  Level of Consciousness: alert  Arousal Level: opens eyes spontaneously  Orientation: oriented x 4  Mood/Behavior: cooperative, calm     Speech: clear, logical, spontaneous    Living Environment:   People in home: spouse     Current living Arrangements: house      Able to return to prior arrangements: yes       Family/Social Support:  Care provided by: self  Provides care for:    Marital Status:   Wife          Description of Support System: Supportive, Involved    Support Assessment: Adequate family and caregiver support, Patient communicates needs well met    Current Resources:   Patient receiving home care services:       Community Resources:    Equipment currently used at home: cane, straight  Supplies currently used at home:      Employment/Financial:  Employment Status:          Financial Concerns: No concerns identified   Referral to Financial Worker: No       Does the patient's insurance plan have a 3 day qualifying hospital stay waiver?  Yes   Will the waiver be used for post-acute placement? No    Lifestyle & Psychosocial Needs:  Social Determinants of Health     Tobacco Use: Medium Risk (9/8/2023)    Patient History     Smoking Tobacco Use: Former     Smokeless Tobacco Use: Former     Passive Exposure: Not on file   Alcohol Use: Not on file   Financial  Resource Strain: Not on file   Food Insecurity: Not on file   Transportation Needs: Not on file   Physical Activity: Not on file   Stress: Not on file   Social Connections: Not on file   Intimate Partner Violence: Not At Risk (2/16/2023)    Humiliation, Afraid, Rape, and Kick questionnaire     Fear of Current or Ex-Partner: No     Emotionally Abused: No     Physically Abused: No     Sexually Abused: No   Depression: At risk (5/30/2023)    PHQ-2     PHQ-2 Score: 3   Housing Stability: Not on file       Functional Status:  Prior to admission patient needed assistance:   Dependent ADLs:: Ambulation-cane  Dependent IADLs:: Independent, Cooking, Cleaning       Mental Health Status:  Mental Health Status: No Current Concerns       Chemical Dependency Status:  Chemical Dependency Status: No Current Concerns             Values/Beliefs:  Spiritual, Cultural Beliefs, [employer] Practices, Values that affect care:                 Additional Information:  SW met with patient and wife at bedside. Patient gave permission for his wife to assist with discharge planning. Patient has a high URR of 27%.     Patient lives at home with his wife. Patient has been independent in all cares. His wife assists with cooking and cleaning. Patient and family are going to hire someone to assist with yard work and snow removal. Patient uses a cane. He has a walker at his brother's he can use if needed.     Patient states he has an RN through [employer]. It is unclear if its home care vs an extra layer of support through insurance. JENNY placed call to [employer] with patient permission. Patient is not getting a skilled RN. Patient has a community RN for resources. No orders needed unless wanting skilled home care.     Patients wife helps manage appointments. Patient has an infusion on 9/15 that wife is wondering if she should reschedule.     Patient is interested in cooking classes to help manage diabetes. JENNY placed info in AVS.     SW will remain  available for discharge planning.     MILLI Hale, LGSW  Emergency Room   Please contact the SW on the floor in which the patient is staying for any questions or concerns

## 2023-09-09 NOTE — PROGRESS NOTES
Brief consult note:  Regarding internal medicine consult for medical management of comorbidities for this patient on the surgery team.  Per chart review, patient appears hemodynamically stable.  Does have history of DM 2, seems that blood sugars run high at home regularly in the high 100s.  Blood glucose preoperatively was 190, postoperatively elevated to 250.  Patient already has orders for Lantus and sliding scale insulin.  Full consult note will be completed in the morning.    Please page with any urgent questions or concerns regarding management of patient's medical comorbidities.    Roberto Carlos Michel MD

## 2023-09-09 NOTE — PLAN OF CARE
Pertinent assessments: A&Ox4, VSS on RA. Capno monitoring. Reporting pain 3/10 - scheduled Tylenol given. PCA in place. Abd binder in place with ice for comfort. 3 lap sites, 1 incision with glue open to air. Pt is hard of hearing. Siddiqui in place, to be Dc'd POD2. LR at 75 ml/hr. .     Major Shift Events: Walked in the hallway 1x this morning. Tolerated well.     Treatment Plan: Monitor vitals, ACHS, pain control. Formal hospitalist consult today

## 2023-09-09 NOTE — ANESTHESIA POSTPROCEDURE EVALUATION
Patient: Austin Garza    Procedure: Procedure(s):  Laparoscopic Extended Right Colectomy converted to open       Anesthesia Type:  General    Note:     Postop Pain Control: Uneventful            Sign Out: Well controlled pain   PONV: No   Neuro/Psych: Uneventful            Sign Out: Acceptable/Baseline neuro status   Airway/Respiratory:             Sign Out: Acceptable/Baseline resp. status   CV/Hemodynamics:             Sign Out: Acceptable CV status   Other NRE:    DID A NON-ROUTINE EVENT OCCUR?            Last vitals:  Vitals Value Taken Time   /70 09/08/23 1930   Temp 97.3  F (36.3  C) 09/08/23 1915   Pulse 90 09/08/23 1936   Resp 10 09/08/23 1936   SpO2 98 % 09/08/23 1936   Vitals shown include unvalidated device data.    Electronically Signed By: Spike Arias DO  September 9, 2023  7:06 AM

## 2023-09-09 NOTE — PLAN OF CARE
Goal Outcome Evaluation:  Pertinent assessments: A&Ox4, VSS. On capno. Sats 97% on RA. Assist x1, Siddiqui catheter in place. Draining adequately. To be D/c'd POD #2. Bg were 215 and 205. Coverage Insulin given. Ambulated to hallway x1. PCA pump in use. Scheduled Tylenol. Rating pain 2/10. LR infusing at 75ml/hr. PIV on R arm SL. 3 lap site & Midline incision with liquid bandage is WDL.Open to air and Ice applied. No drainage noted     Major Shift Events: None     Treatment Plan: Siddiqui catheter care, Ambulation,

## 2023-09-09 NOTE — CONSULTS
[facility]    Hospitalist Consultation    Date of Admission:  9/8/2023  Date of Consult (When I saw the patient): 09/09/23    Assessment & Plan   Austin Garza is a 83 year old male patient with past medical history of hypertension, GERD, CLL, diabetes mellitus type 2, transverse colon cancer, who was admitted for obstructing elective surgery. Patient underwent elective diagnostic laparoscopy and open extended right colectomy with ileocolic anastomosis for curative intent.  We are consulted for postop diabetes management.    Colon cancer Status post laparoscopic, open extended right colectomy with ileocolic anastomosis  Colorectal surgery following.  Pain management, DVT prophylaxis, PT/OT per primary team.    Diabetes mellitus type 2, insulin requiring  His PTA medications include insulin glargine 10 units every morning, dulaglutide 4.5 mg subcu every week, insulin sliding scale  Currently he is on full liquid diet.  He was resumed on insulin glargine 5 units every morning and insulin sliding scale.  We will monitor blood sugar and adjust his insulin dose as needed.    Hypertension  We will continue Toprol- mg p.o. daily, losartan 25 mg p.o. daily.  Will monitor blood pressure    Hyperlipidemia: We will continue rosuvastatin    GERD: We will continue pantoprazole    DVT Prophylaxis: Defer to primary service  Code Status: Full Code    Disposition: Expected discharge per primary team    Sofia Win MD    Reason for Consult   Reason for consult: Postop medical management    Primary Care Physician   Luma Kauffman    Chief Complaint   Postop medical management    History is obtained from the patient    History of Present Illness   Austin Garza is a 83 year old male patient with past medical history of hypertension, GERD, CLL, diabetes mellitus type 2, transverse colon cancer, who was admitted for obstructing elective surgery.  Patient underwent elective diagnostic laparoscopy  and open extended right colectomy with ileocolic anastomosis for curative intent.  Hospitalist team consulted for postop medical management.  Patient denies nausea, vomiting, fever.  He stated that his pain is well controlled at this time.      Past Medical History    I have reviewed this patient's medical history and updated it with pertinent information if needed.   Past Medical History:   Diagnosis Date    AAA (abdominal aortic aneurysm)     BCC (basal cell carcinoma of skin) - face     CLL (chronic lymphocytic leukemia)     Diaphragmatic hernia     Diverticulitis of colon     Esophageal reflux     Essential hypertension     Hyperlipidemia     Irritable bowel syndrome     Macular degeneration (senile) of retina     Mumps     Squamous Cell Carcinoma, Back 1988    Type 2 diabetes mellitus        Past Surgical History   I have reviewed this patient's surgical history and updated it with pertinent information if needed.  Past Surgical History:   Procedure Laterality Date    ANESTHESIA CARDIOVERSION N/A 8/2/2021    Procedure: ANESTHESIA, FOR CARDIOVERSION;  Surgeon: GENERIC ANESTHESIA PROVIDER;  Location: [facility] OR    APPENDECTOMY OPEN  1966    BONE MARROW BIOPSY, BONE SPECIMEN, NEEDLE/TROCAR N/A 11/21/2017    Procedure: BIOPSY BONE MARROW;  BONE MARROW BIOPSY;  Surgeon: Shady Garcia MD;  Location:  GI    COLONOSCOPY  2002    COLONOSCOPY N/A 7/28/2023    Procedure: COLONOSCOPY;  Surgeon: Julio Castro MD;  Location:  OR    CV CORONARY ANGIOGRAM N/A 6/29/2022    Procedure: Coronary Angiogram;  Surgeon: Evaristo Beaulieu MD;  Location:  HEART [facility] CATH LAB    ENDOVASCULAR REPAIR ANEURYSM ABDOMINAL AORTA N/A 12/4/2017    Procedure: ENDOVASCULAR REPAIR ANEURYSM ABDOMINAL AORTA;  ENDOVASCULAR REPAIR ANEURYSM ABDOMINAL AORTA;  Surgeon: Milton Cazares MD;  Location:  OR    Fistulotomy with marsupialization for repair of fisture in ano  2007    IR ABDOMINAL AORTOGRAM  3/11/2019    IR  VISCERAL EMBOLIZATION  5/13/2019    Multiple skin tags - resection  1998    Squamous cell skin cancer resection - back  1985-90    VASECTOMY         Prior to Admission Medications   Prior to Admission Medications   Prescriptions Last Dose Informant Patient Reported? Taking?   Alcohol Swabs (ALCOHOL PREP) 70 % PADS   Yes No   Dulaglutide 4.5 MG/0.5ML SOPN Past Week  No Yes   Sig: Inject 4.5 mg Subcutaneous once a week   HUMALOG KWIKPEN 100 UNIT/ML soln 9/7/2023  Yes Yes   Microlet Lancets MISC   Yes Yes   Sig: USE TO TEST BLOOD SUGAR 3 TIMES DAILY OR AS DIRECTED.   aspirin 81 MG EC tablet 9/1/2023  No No   Sig: Take 1 tablet (81 mg) by mouth daily   blood glucose (NO BRAND SPECIFIED) lancets standard   No No   Sig: Use to test blood sugar 3 times daily or as directed.   blood glucose (NO BRAND SPECIFIED) test strip   No No   Sig: Contour Next Test Strips-Use to test blood sugar 3 times daily or as directed.   insulin aspart (NOVOLOG PEN) 100 UNIT/ML pen 9/7/2023  No Yes   Sig: Inject 1-7 Units Subcutaneous 3 times daily (before meals) Do Not give Correction Insulin if Pre-Meal BG less than 140. For Pre-Meal  - 189 give 1 unit. For Pre-Meal  - 239 give 2 units. For Pre-Meal  - 289 give 3 units. For Pre-Meal  - 339 give 4 units. For Pre-Meal - 399 give 5 units. For Pre-Meal -449 give 6 units For Pre-Meal BG greater than or equal to 450 give 7 units.   insulin glargine (LANTUS PEN) 100 UNIT/ML pen 9/8/2023  No Yes   Sig: Inject 10 Units Subcutaneous every morning (before breakfast)   losartan (COZAAR) 25 MG tablet 9/8/2023  No Yes   Sig: Take 1 tablet (25 mg) by mouth daily   metoprolol succinate ER (TOPROL XL) 100 MG 24 hr tablet 9/8/2023  No Yes   Sig: Take 1 tablet (100 mg) by mouth daily   metroNIDAZOLE (FLAGYL) 500 MG tablet 9/7/2023  Yes Yes   Sig: TAKE 1 TABLET BY MOUTH AT 1 PM, 2 PM, AND 11 PM THE DAY BEFORE SURGERY.   nitroFURantoin macrocrystal-monohydrate (MACROBID) 100 MG  "capsule 9/8/2023  No Yes   Sig: Take 1 capsule (100 mg) by mouth 2 times daily for 7 days   nitroGLYcerin (NITROSTAT) 0.4 MG sublingual tablet Unknown  No Yes   Sig: One tablet under the tongue every 5 minutes if needed for chest pain. May repeat every 5 minutes for a maximum of 3 doses in 15 minutes\"   ondansetron (ZOFRAN ODT) 4 MG ODT tab Unknown  Yes Yes   Sig: PLEASE SEE ATTACHED FOR DETAILED DIRECTIONS   pantoprazole (PROTONIX) 40 MG EC tablet 9/8/2023  No Yes   Sig: Take 1 tablet (40 mg) by mouth daily   predniSONE (DELTASONE) 10 MG tablet Past Week  No Yes   Sig: Take 1 tablet (10 mg) by mouth daily   rosuvastatin (CRESTOR) 20 MG tablet 9/8/2023  No Yes   Sig: Take 1 tablet (20 mg) by mouth daily      Facility-Administered Medications: None     Allergies   Allergies   Allergen Reactions    Seasonal Allergies        Social History   I have reviewed this patient's social history and updated it with pertinent information if needed. Austin Dashmerlineela  reports that he quit smoking about 48 years ago. His smoking use included cigarettes and pipe. He has a 10.00 pack-year smoking history. He has quit using smokeless tobacco. He reports current alcohol use of about 10.0 - 14.0 standard drinks of alcohol per week. He reports that he does not use drugs.    Family History   I have reviewed this patient's family history and updated it with pertinent information if needed.   Family History   Problem Relation Age of Onset    Cerebrovascular Disease Father         x 2    Hypertension Mother     C.A.D. Mother     Cancer Mother         skin    Eye Disorder Mother         glaucoma    Prostate Cancer No family hx of     Cancer - colorectal No family hx of     Glaucoma No family hx of     Macular Degeneration No family hx of        Review of Systems   The 10 point Review of Systems is negative other than noted in the HPI or here.postop medical management    Physical Exam   Temp: 97.9  F (36.6  C) Temp src: Axillary BP: (!) 142/67 " Pulse: 80   Resp: 16 SpO2: 99 % O2 Device: None (Room air) Oxygen Delivery: 6 LPM  Vital Signs with Ranges  Temp:  [96.5  F (35.8  C)-98.4  F (36.9  C)] 97.9  F (36.6  C)  Pulse:  [80-92] 80  Resp:  [10-16] 16  BP: (118-161)/(67-97) 142/67  SpO2:  [91 %-100 %] 99 %  187 lbs 11.2 oz    GEN:  Alert, oriented x 3, appears comfortable, NAD.  HEENT:  Normocephalic/atraumatic, no scleral icterus, no nasal discharge, mouth moist.  CV:  Regular rate and rhythm, no murmur or JVD.  S1 + S2 noted, no S3 or S4.  LUNGS:  Clear to auscultation bilaterally without rales/rhonchi/wheezing/retractions.  Symmetric chest rise on inhalation noted.  ABD:  Active bowel sounds, soft, non-tender/non-distended.  No rebound/guarding/rigidity.  EXT:  No edema or cyanosis.  Hands/feet warm to touch with good signs of peripheral perfusion.  No joint synovitis noted.  SKIN:  Dry to touch, no exanthems noted in the visualized areas.  NEURO:  Symmetric muscle strength, sensation to touch grossly intact.  No new focal deficits appreciated.    Data   -Data reviewed today: All pertinent laboratory and imaging results from this encounter were reviewed. I personally reviewed no images or EKG's today.  Recent Labs   Lab 09/09/23  0752 09/08/23 2009 09/08/23  1746 09/08/23  1018   WBC  --   --   --  13.0*   HGB 7.7*  --  8.1* 8.5*   MCV  --   --   --  83   PLT  --  81*  --  96*   *  --  137 136   POTASSIUM 3.9  --  3.2* 3.7   CHLORIDE 102  --  105 100   CO2 21*  --  20* 24   BUN 16.7  --  16.5 17.7   CR 1.35*  --  1.32* 1.48*   ANIONGAP 10  --  12 12   MAGDALENO 8.0*  --  7.7* 8.9   *  --  254* 190*       No results found for this or any previous visit (from the past 24 hour(s)).

## 2023-09-09 NOTE — PROGRESS NOTES
Colorectal Surgery Progress Note    DOS: 08 SEP 2023  Lap converted to open right hemicolectomy for transverse colon cancer.   Conversion for adhesions and large AAA    Subjective:  No acute events overnight. Pain well controlled.Tolerating a FLD diet. No nausea or emesis.No Flatus. Ambulating.       Vitals:  Vitals:    09/08/23 2238 09/09/23 0008 09/09/23 0313 09/09/23 0742   BP: (!) 140/78  (!) 141/75 (!) 142/67   BP Location: Left arm  Left arm Left arm   Pulse: 89  80 80   Resp: 12  14 16   Temp:  98.4  F (36.9  C)  97.9  F (36.6  C)   TempSrc:  Axillary  Axillary   SpO2: 96% 98% 97% 99%   Weight:       Height:         I/O:  I/O last 3 completed shifts:  In: 4433 [P.O.:25; I.V.:3578]  Out: 775 [Urine:775]      Physical Exam:  Gen: NAD  Cardio: RRR  Chest: NLB on RA  Abd: Soft, non-distended, appropriately tender, no guarding   Incision C/D/I with surgical glue in place. No erythema or drainage.       BMP  Recent Labs   Lab 09/09/23 0752 09/08/23 1746 09/08/23  1018   * 137 136   POTASSIUM 3.9 3.2* 3.7   CHLORIDE 102 105 100   CO2 21* 20* 24   BUN 16.7 16.5 17.7   CR 1.35* 1.32* 1.48*   * 254* 190*   MAG 1.6*  --   --    PHOS 4.1  --   --      CBC  Recent Labs   Lab 09/09/23 0752 09/08/23 2009 09/08/23  1746 09/08/23  1018 09/05/23  1226   WBC  --   --   --  13.0*  --    HGB 7.7*  --  8.1* 8.5* 7.3*   HCT  --   --   --  28.0*  --    PLT  --  81*  --  96*  --          ASSESSMENT: This is a 83 year old male s/p open right hemicolectomy  for colon cancer recovering quite well.  Clinically stable, awaiting return of bowel function.    PLAN:    Neuro/Pain: Dilaudid PCA. Transition to oral with IV breakthrough. PRN tylenol.   CV/PULM: No acute issues. Encourage IS, ambulation. Continue beta blocker. Saline lock IV  GI/FEN: Fulls. Continue with fulls until passing flatus.   : No issues. Discontinue baez. DTV.  Heme: Postop anemia - Hgb 7.7 from 8.1. Likely intra-op blood loss. Will repeat this  afternoon. No indication for transfusion, continue to monitor.  ID: NO issues  Endocrine: Appreciate hospitalist input for management of gluocose. On insulin. Copntinue prednisone.   Activity: Up ad georgie  Ppx: SCDs, heparin  Dispo: SW following to assist with disposition. Home vs. Rehab.     Chandrika Rebollar MD  Colon and Rectal Surgery   [phone]

## 2023-09-10 NOTE — PROGRESS NOTES
[facility]    Hospitalist Progress Note  Provider : Sofia Win MD, MD  Date of Service (when I saw the patient): 09/10/2023    Assessment & Plan   Austin Garza is a 83 year old male patient with past medical history of hypertension, GERD, CLL, diabetes mellitus type 2, transverse colon cancer, who was admitted for obstructing elective surgery. Patient underwent elective diagnostic laparoscopy and open extended right colectomy with ileocolic anastomosis for curative intent.  We are consulted for postop diabetes management.     Colon cancer Status post laparoscopic, open extended right colectomy with ileocolic anastomosis  Colorectal surgery following.  Pain management, DVT prophylaxis, PT/OT per primary team.     Diabetes mellitus type 2, insulin requiring  His PTA medications include insulin glargine 10 units every morning, dulaglutide 4.5 mg subcu every week, insulin sliding scale  Currently on full liquid diet.  His blood sugar is in 200s.  We will increase his Lantus insulin to 10 units daily.  We will continue insulin sliding scale.  We will monitor blood sugar and adjust insulin dose as needed.     Hypertension  We will continue Toprol- mg p.o. daily, losartan 25 mg p.o. daily.  Will monitor blood pressure     Hyperlipidemia: We will continue rosuvastatin     GERD: We will continue pantoprazole     DVT Prophylaxis: Defer to primary service  Code Status: Full Code    Code Status: Full Code    Disposition: Expected discharge per primary team     Sofia Win MD    Interval History   Patient seen and examined today.  Denies nausea, vomiting, dysuria, urgency or frequency.    -Data reviewed today: I reviewed all new labs and imaging results over the last 24 hours. I personally reviewed     Physical Exam   Temp: 97.6  F (36.4  C) Temp src: Oral BP: 138/68 Pulse: 74   Resp: 16 SpO2: 96 % O2 Device: None (Room air)    Vitals:    09/08/23 1023 09/10/23 0542   Weight:  85.1 kg (187 lb 11.2 oz) 86.6 kg (190 lb 14.7 oz)     Vital Signs with Ranges  Temp:  [97.6  F (36.4  C)-98.4  F (36.9  C)] 97.6  F (36.4  C)  Pulse:  [74-82] 74  Resp:  [16] 16  BP: (123-148)/(55-72) 138/68  SpO2:  [94 %-98 %] 96 %  I/O last 3 completed shifts:  In: 2411 [I.V.:2411]  Out: 1250 [Urine:1250]    GEN:  Alert, oriented x 3, appears comfortable, NAD.  HEENT:  Normocephalic/atraumatic, no scleral icterus, no nasal discharge, mouth moist.  CV:  Regular rate and rhythm, no murmur or JVD.  S1 + S2 noted, no S3 or S4.  LUNGS:  Clear to auscultation bilaterally without rales/rhonchi/wheezing/retractions.  Symmetric chest rise on inhalation noted.  ABD:  Active bowel sounds, soft, non-tender/non-distended.  No rebound/guarding/rigidity.  EXT:  No edema or cyanosis.  Hands/feet warm to touch with good signs of peripheral perfusion.  No joint synovitis noted.  SKIN:  Dry to touch, no exanthems noted in the visualized areas.  NEURO:  Symmetric muscle strength, sensation to touch grossly intact.  No new focal deficits appreciated.    Medications    lactated ringers 75 mL/hr at 09/10/23 0043      acetaminophen  975 mg Oral Q8H    heparin ANTICOAGULANT  5,000 Units Subcutaneous Q8H    insulin aspart  1-7 Units Subcutaneous TID AC    insulin aspart  1-5 Units Subcutaneous At Bedtime    [START ON 9/11/2023] insulin glargine  10 Units Subcutaneous QAM AC    insulin glargine  5 Units Subcutaneous Once    metoprolol succinate ER  100 mg Oral Daily    pantoprazole  40 mg Oral Daily    predniSONE  10 mg Oral Daily    rosuvastatin  20 mg Oral Daily    sodium chloride (PF)  3 mL Intracatheter Q8H       Data   Recent Labs   Lab 09/10/23  0630 09/09/23  1616 09/09/23  0752 09/08/23 2009 09/08/23  1746 09/08/23  1018   WBC  --  19.1*  --   --   --  13.0*   HGB  --  8.2* 7.7*  --  8.1* 8.5*   MCV  --  87  --   --   --  83   PLT 66* 84*  --  81*  --  96*   NA  --   --  133*  --  137 136   POTASSIUM  --   --  3.9  --  3.2* 3.7    CHLORIDE  --   --  102  --  105 100   CO2  --   --  21*  --  20* 24   BUN  --   --  16.7  --  16.5 17.7   CR  --   --  1.35*  --  1.32* 1.48*   ANIONGAP  --   --  10  --  12 12   MAGDALENO  --   --  8.0*  --  7.7* 8.9   *  --  215*  --  254* 190*       No results found for this or any previous visit (from the past 24 hour(s)).

## 2023-09-10 NOTE — PROGRESS NOTES
Colorectal Surgery Progress Note    DOS: 10 SEP 2023  Lap converted to open right hemicolectomy for transverse colon cancer.   Conversion for adhesions and large AAA    Subjective:  No acute events overnight. Siddiqui removed yesterday and voiding. Having surgical pain, especially when getting out of bed. Still not passing gas or BM. Thrombocytopenic to 66, WBC up to 19. Afebrile. Denies nausea, but endorses hiccups and burping.       Vitals:  Vitals:    09/09/23 1524 09/10/23 0001 09/10/23 0542 09/10/23 0726   BP: 123/55 (!) 148/70  138/68   BP Location: Left arm Left arm  Left arm   Pulse: 82 81  74   Resp: 16 16  16   Temp: 98.1  F (36.7  C) 98.4  F (36.9  C)  97.6  F (36.4  C)   TempSrc: Oral Oral  Oral   SpO2: 96% 98%  96%   Weight:   86.6 kg (190 lb 14.7 oz)    Height:         I/O:  I/O last 3 completed shifts:  In: 2411 [I.V.:2411]  Out: 1250 [Urine:1250]      Physical Exam:  Gen: NAD  Cardio: RRR  Chest: NLB on RA  Abd: Soft, obese, appropriately tender, no guarding   Incision C/D/I with surgical glue in place. No erythema or drainage.       BMP  Recent Labs   Lab 09/10/23  0630 09/09/23  0752 09/08/23  1746 09/08/23  1018   NA  --  133* 137 136   POTASSIUM  --  3.9 3.2* 3.7   CHLORIDE  --  102 105 100   CO2  --  21* 20* 24   BUN  --  16.7 16.5 17.7   CR  --  1.35* 1.32* 1.48*   * 215* 254* 190*   MAG  --  1.6*  --   --    PHOS  --  4.1  --   --        CBC  Recent Labs   Lab 09/10/23  0630 09/09/23  1616 09/09/23  0752 09/08/23 2009 09/08/23  1746 09/08/23  1018   WBC  --  19.1*  --   --   --  13.0*   HGB  --  8.2* 7.7*  --  8.1* 8.5*   HCT  --  27.4*  --   --   --  28.0*   PLT 66* 84*  --  81*  --  96*           ASSESSMENT: This is a 83 year old male s/p open right hemicolectomy for colon cancer. Has uptrending WBC, leukocytosis of 19 from 13, unclear source currently as patient appears clinically well, although has not yet had ROBF.     Also notable for worsening thrombocytopenia, per chart review,  has a history of thrombocytopenia and CLL, although his counts had improved to 166k highest on 8/7, being followed by [facility]. Given his blood dyscrasias and apparently having baseline of WBC 13-14 in August (although WBC noted in normal range of 5-7 prior to that), difficult to interpret current leukocytosis. May warrant imaging if continuing to uptrend and persisting ileus.    PLAN:    Neuro/Pain: Oral oxycodone with IV breakthrough. PRN tylenol.   CV/PULM: No acute issues. Encourage IS, ambulation. Continue beta blocker.  GI/FEN: Fulls. Continue with fulls until passing flatus.   : No issues. Voiding after Siddiqui removal.  Heme:         - Postop anemia, stable: Hgb 8.2 from 7.7. Likely baseline anemia from tumor. No indication for transfusion, continue to monitor.         - Will continue to trend thrombocytopenia and continue heparin for now. Ultimately will resume Eliquis at home for history of a fib and requires at least PPX dosing of anticoagulation for 30 days postop for history of cancer  ID: WBC 13 --> 19. Repeat CBC tomorrow with diff.  Endocrine: Appreciate hospitalist input for management of gluocose. On insulin. Copntinue prednisone.   Activity: Up ad georgie  Ppx: SCDs, heparin  Dispo: SW following to assist with disposition. Home vs. Rehab.     Solange Micehle MD Fellow  Colon and Rectal Surgery

## 2023-09-10 NOTE — PROGRESS NOTES
To Do:  End of Shift Summary  For vital signs and complete assessments, please see documentation flowsheets.     Pertinent assessments: A&Ox4. VSS. RA. A of 1 with Gb and walker. Siddiqui discontinued. Pt voided 100ml. , 259 insulin given per protocol. Ambulated in the hallway x1 this shift. PCA pump discontinued. Pt rates pain of 8, tylenol and IV dilaudid PRN given. LR infusing at 75ml/hr. PIV on right arm SL. 3 lap sites and midline incision with liquid bandage WDL. Open to air, abdominal band in place. Full liquid diet.     Major Shift Events: Siddiqui and PCA discontinued.     Treatment Plan: pain management, ambulation, diet management.      Bedside Nurse: Laurel Najera RN

## 2023-09-10 NOTE — PLAN OF CARE
Goal Outcome Evaluation:  Pertinent assessments: A&Ox4, VSS. On RA. Assist x1. Uses urinal to void. No urinary retention issue. Abd. Soft & tender. Active bowel on all quadrants. Not passing flatus. No BM since surgery. Denied  N/V. LR infusing at 75ml/hr. Continues on full liquids & tolerating well. Lap site & midline incision WDL. Bg were 200s. Coverage given    Major Shift Events: Lantus dosage increased. PRN dilaudid & Oxycodone given x1.     Treatment Plan: Pain control, IS, Increase ambulation, advancing diet

## 2023-09-10 NOTE — PLAN OF CARE
End of Shift Summary  For vital signs and complete assessments, please see documentation flowsheets.     Pertinent assessments: A&Ox4. VSS. RA. A of 1. . Pt given scheduled tylenol and prn dilaudid for abdominal pain. Denies nausea. Lap sites and midline incisions with liquid bandage, JOVAN. Bowel sounds present, denies flatus.      Treatment Plan: pain management, ambulation, IVF, diet management.

## 2023-09-11 NOTE — PLAN OF CARE
"Goal Outcome Evaluation:    Pertinent assessments: A&O. Ax1 with cane. BS hypo, no gas/bm but is belching intermittent. Lap sites open to air, denies use of abd binder. Voiding adequately. Diet changed to NPO, wife at bedside and very angry at this stating \"he's going to starve to death\", RN provided education that diet changed d/t inability to pas gas/bm, multiple emesis episodes and belching.    Major Shift Events Morphine and zofran given x1, effective per pt.     Treatment Plan: Pain and nausea management. Monitor for return of bowel function.    "

## 2023-09-11 NOTE — PROGRESS NOTES
To Do:  End of Shift Summary  For vital signs and complete assessments, please see documentation flowsheets.     Pertinent assessments: A&Ox4. VSS. RA. Assist of 1 with GB and walker. No bowel movement and not passing flatus. , 213 insulin given per protocol. Ambulated in the hallway x1 this shift. Pt rates pain of 5, tylenol and 5mg PO oxy, IV dilaudid PRN given. PIV on right arm SL. 3 lap sites and midline incision with liquid bandage WDL. Open to air, abdominal band in place. Full liquid diet.     Major Shift Events: PRN Oxycodone given x2, IV dilaudid.     Treatment Plan: Pain management, encourage fluids and activity.     Bedside Nurse: Laurel Najera RN

## 2023-09-11 NOTE — PROGRESS NOTES
[facility]    Hospitalist Progress Note  Provider : Sofia Win MD, MD  Date of Service (when I saw the patient): 09/11/2023    Assessment & Plan   Austin Garza is a 83 year old male patient with past medical history of hypertension, GERD, CLL, diabetes mellitus type 2, transverse colon cancer, who was admitted for obstructing elective surgery. Patient underwent elective diagnostic laparoscopy and open extended right colectomy with ileocolic anastomosis for curative intent.  We are consulted for postop diabetes management.     Colon cancer Status post laparoscopic, open extended right colectomy with ileocolic anastomosis  Patient had vomiting twice after dose of IV Dilaudid. He also complains of hiccups. WBC elevated at 25. No fever.   -He was seen by colorectal surgery today and put on n.p.o.  -We will continue lactated Ringer's at 75 mill per hour while n.p.o.  We will closely monitor vital signs  -Diet orders per colorectal surgery  -Pain medications as needed    Diabetes mellitus type 2, insulin requiring  -His PTA medications include insulin glargine 10 units every morning, dulaglutide 4.5 mg subcu every week, insulin sliding scale  -Currently on full liquid diet.    -Patient is currently NPO.  -We will change insulin sliding scale to every 4 hours while NPO.  -We will hold Lantus insulin while n.p.o. and resume once starts taking p.o.    Hypertension  -Will continue Toprol- mg p.o. daily, losartan 25 mg p.o. daily.  Will monitor blood pressure     Hyperlipidemia: We will continue rosuvastatin     GERD: We will continue pantoprazole     DVT Prophylaxis: Defer to primary service. Lovenox on hold due to thrombocytopenia    Code Status: Full Code    Disposition: Expected discharge: Anticipate at least 2 nights of hospital course until his symptoms improve and cleared by surgery    Sofia Win MD    Interval History   Patient seen and examined today.  He states that  he had 2 episodes of vomiting.  He also complains of recurrent hiccups.  He states that the abdominal pain is improving.  He has no fever.  He also denies dysuria, urgency or frequency.    -Data reviewed today: I reviewed all new labs and imaging results over the last 24 hours. I personally reviewed     Physical Exam   Temp: 97.7  F (36.5  C) Temp src: Oral BP: (!) 154/91 Pulse: 104   Resp: 18 SpO2: 97 % O2 Device: None (Room air)    Vitals:    09/08/23 1023 09/10/23 0542   Weight: 85.1 kg (187 lb 11.2 oz) 86.6 kg (190 lb 14.7 oz)     Vital Signs with Ranges  Temp:  [97.5  F (36.4  C)-97.9  F (36.6  C)] 97.7  F (36.5  C)  Pulse:  [] 104  Resp:  [18-20] 18  BP: (142-156)/(78-91) 154/91  SpO2:  [97 %] 97 %  I/O last 3 completed shifts:  In: 50 [P.O.:50]  Out: 450 [Urine:200; Emesis/NG output:250]    GEN:  Alert, oriented x 3, appears comfortable, NAD.  HEENT:  Normocephalic/atraumatic, no scleral icterus, no nasal discharge, mouth moist.  CV:  Regular rate and rhythm, no murmur or JVD.  S1 + S2 noted, no S3 or S4.  LUNGS:  Clear to auscultation bilaterally without rales/rhonchi/wheezing/retractions.  Symmetric chest rise on inhalation noted.  ABD:  Active bowel sounds, soft, non-tender/non-distended.  No rebound/guarding/rigidity.  EXT:  No edema or cyanosis.  Hands/feet warm to touch with good signs of peripheral perfusion.  No joint synovitis noted.  SKIN:  Dry to touch, no exanthems noted in the visualized areas.  NEURO:  Symmetric muscle strength, sensation to touch grossly intact.  No new focal deficits appreciated.    Medications    lactated ringers 75 mL/hr at 09/10/23 1506      acetaminophen  975 mg Oral Q8H    [Held by provider] heparin ANTICOAGULANT  5,000 Units Subcutaneous Q8H    insulin aspart  1-7 Units Subcutaneous TID AC    insulin aspart  1-5 Units Subcutaneous At Bedtime    insulin glargine  10 Units Subcutaneous QAM AC    lidocaine  2 patch Transdermal Q24H    metoprolol succinate ER  100 mg  Oral Daily    pantoprazole  40 mg Oral Daily    predniSONE  10 mg Oral Daily    rosuvastatin  20 mg Oral Daily    sodium chloride (PF)  3 mL Intracatheter Q8H       Data   Recent Labs   Lab 09/11/23  0811 09/11/23  0727 09/11/23  0208 09/10/23  0729 09/10/23  0630 09/09/23  1736 09/09/23  1616 09/09/23  0832 09/09/23  0752 09/08/23  1927 09/08/23  1746 09/08/23  1438 09/08/23  1018   WBC  --  25.3*  --   --   --   --  19.1*  --   --   --   --   --  13.0*   HGB  --  9.8*  --   --   --   --  8.2*  --  7.7*  --  8.1*  --  8.5*   MCV  --  87  --   --   --   --  87  --   --   --   --   --  83   PLT  --  92*  --   --  66*  --  84*  --   --    < >  --   --  96*   NA  --  133*  --   --   --   --   --   --  133*  --  137  --  136   POTASSIUM  --  5.1  --   --   --   --   --   --  3.9  --  3.2*  --  3.7   CHLORIDE  --  98  --   --   --   --   --   --  102  --  105  --  100   CO2  --  23  --   --   --   --   --   --  21*  --  20*  --  24   BUN  --  16.9  --   --   --   --   --   --  16.7  --  16.5  --  17.7   CR  --  1.43*  --   --   --   --   --   --  1.35*  --  1.32*  --  1.48*   ANIONGAP  --  12  --   --   --   --   --   --  10  --  12  --  12   MAGDALENO  --  9.1  --   --   --   --   --   --  8.0*  --  7.7*  --  8.9   * 305* 211*   < > 209*   < >  --    < > 215*   < > 254*   < > 190*    < > = values in this interval not displayed.         No results found for this or any previous visit (from the past 24 hour(s)).

## 2023-09-11 NOTE — PROGRESS NOTES
COLON & RECTAL SURGERY  PROGRESS NOTE    September 11, 2023  Post-op Day # 3    SUBJECTIVE:  Patient feels pain is improving. On fulls, but with continued belching/hiccups in the room. Had 2 episodes of emesis after receiving IV dilaudid. Was switched to Morphine. No BM or gas. Feels like he's going to have a BM soon. WBC increased to 25.3. Hgb stable.    OBJECTIVE:  Temp:  [97.5  F (36.4  C)-97.9  F (36.6  C)] 97.7  F (36.5  C)  Pulse:  [] 104  Resp:  [18-20] 18  BP: (142-156)/(78-91) 154/91  SpO2:  [97 %] 97 %    Intake/Output Summary (Last 24 hours) at 9/11/2023 0940  Last data filed at 9/11/2023 0500  Gross per 24 hour   Intake 50 ml   Output 250 ml   Net -200 ml       GENERAL:  Awake, alert, no acute distress, lying in bed  HEAD: Nomocephalic atraumatic  SCLERA: anicteric  EXTREMITIES: warm and well perfused  ABDOMEN:  Soft, appropriately tender, distended, no rebound or guarding, no peritoneal signs  INCISION:  C/d/I. Some bruising.    LABS:  Lab Results   Component Value Date    WBC 25.3 09/11/2023    WBC 12.7 06/01/2021     Lab Results   Component Value Date    HGB 9.8 09/11/2023    HGB 13.5 06/01/2021     Lab Results   Component Value Date    HCT 33.6 09/11/2023    HCT 41.3 06/01/2021     Lab Results   Component Value Date    PLT 92 09/11/2023     06/01/2021     Last Basic Metabolic Panel:  Lab Results   Component Value Date     09/11/2023     06/01/2021      Lab Results   Component Value Date    POTASSIUM 5.1 09/11/2023    POTASSIUM 3.9 08/02/2022    POTASSIUM 4.2 06/01/2021     Lab Results   Component Value Date    CHLORIDE 98 09/11/2023    CHLORIDE 103 08/02/2022    CHLORIDE 104 06/01/2021     Lab Results   Component Value Date    MAGDALENO 9.1 09/11/2023    MAGDALENO 8.9 06/01/2021     Lab Results   Component Value Date    CO2 23 09/11/2023    CO2 24 08/02/2022    CO2 30 06/01/2021     Lab Results   Component Value Date    BUN 16.9 09/11/2023    BUN 20 08/02/2022    BUN 26 06/01/2021      Lab Results   Component Value Date    CR 1.43 09/11/2023    CR 1.51 06/01/2021     Lab Results   Component Value Date     09/11/2023     09/11/2023     08/02/2022     06/01/2021       ASSESSMENT/PLAN: Benji is an 83 year old man who is POD#3 s/p open extended right colectomy for colon cancer.     - NPO/IVF. Keep head of the bed elevated due to aspiration risk.   - Trend WBC and platelets  - Pain management as needed  - Encourage ambulation  - Hold SQH for DVT ppx  - Appreciate hospitalist assistance with comorbidities    Disposition: Expected discharge in 2-3 days.  Barriers to discharge: Tolerating low fiber diet, pain controlled with oral meds, return of bowel function.    For questions/paging, please contact the CRS office at [phone].    Beth Tovar PA-C  [facility]  Phone: [phone]

## 2023-09-11 NOTE — PLAN OF CARE
End of Shift Summary  For vital signs and complete assessments, please see documentation flowsheets.     Pertinent assessments: BP elevated, on room air. A&O. Ax1 with cane. BS hypo, denies flatus but is belching. Incisions CDI, JOVAN. Voiding without difficulty. Full liquid diet.     Major Shift Events: nausea and emesis following PRN IV dilaudid, felt instantly better following emesis     Treatment Plan: Pain management, encourage fluids and activity.

## 2023-09-12 NOTE — PROGRESS NOTES
Messaged provider,KETTY pt has had multiple green emesis episodes throughout the night. Given IV zofran & compazine minimal relief noted

## 2023-09-12 NOTE — PLAN OF CARE
Goal Outcome Evaluation:                  To Do:  End of Shift Summary  For vital signs and complete assessments, please see documentation flowsheets.     Pertinent assessments: Pt had NG placed this AM with total of 2600 ml out this shift.Pt states that pain is much improved and he was able to sleep some this afternoon. Remains NPO.    Major Shift Events High NG output. Strict I & O    Treatment Plan: Continue to monitor input and output.  Bedside Nurse: Celina Saleem RN

## 2023-09-12 NOTE — PROGRESS NOTES
[facility]    Medicine Progress Note - Hospitalist Service    Date of Admission:  9/8/2023    Assessment & Plan     Austin Garza is a 83 year old male patient with past medical history of hypertension, GERD, CLL, diabetes mellitus type 2, transverse colon cancer, who was admitted for obstructing elective surgery. Patient underwent elective diagnostic laparoscopy and open extended right colectomy with ileocolic anastomosis for curative intent.  We are consulted for postop diabetes management.     Colon cancer Status post laparoscopic, open extended right colectomy with ileocolic anastomosis and now probable ileus.  Patient had vomiting twice after dose of IV Dilaudid. He also complains of hiccups. WBC elevated at 25. No fever.   -He was seen by colorectal surgery today and put on n.p.o.  -IVF's.  - s/p NG to LIS 9/12.  - NPO.  -Pain medications as needed     Diabetes mellitus type 2, insulin requiring  -His PTA medications include insulin glargine 10 units every morning, dulaglutide 4.5 mg subcu every week, insulin sliding scale  -Patient is currently NPO.  -We will change insulin sliding scale to every 4 hours while NPO.  -hyperglycemic, will start lantus 15 units subcutaneous bid.     PAF.  - IV metoprolol prn.  - eliquis on hold.    Polyarthritis.  - On prednisone, will hold for now.     Hyperlipidemia: We will continue rosuvastatin     GERD: We will continue pantoprazole    Hx of CLL with elevated WCC, low platelets.  - Monitor.    CKD stage 3.  - avoid nephrotoxins.       Diet: NPO for Medical/Clinical Reasons Except for: Meds    DVT Prophylaxis: Heparin SQ  Siddiqui Catheter: Not present  Lines: None     Cardiac Monitoring: None  Code Status: Full Code      Clinically Significant Risk Factors                # Thrombocytopenia: Lowest platelets = 92 in last 2 days, will monitor for bleeding   # Hypertension: Noted on problem list       # DMII: A1C = 7.1 % (Ref range: 0.0 - 5.6 %) within  "past 6 months, PRESENT ON ADMISSION  # Overweight: Estimated body mass index is 28.18 kg/m  as calculated from the following:    Height as of this encounter: 1.753 m (5' 9.02\").    Weight as of this encounter: 86.6 kg (190 lb 14.7 oz)., PRESENT ON ADMISSION            Disposition Plan     Expected Discharge Date: 09/14/2023,  3:00 PM    Destination: home with family            Elmira Madrigal MD  Hospitalist Service  [facility]  Securely message with [employer] (more info)  Text page via [facility] Paging/Directory   ______________________________________________________________________    Interval History     + abdominal pain. + NG placed due to N/V. No flatus/BM.    Physical Exam 60  Vital Signs: Temp: 98.2  F (36.8  C) Temp src: Oral BP: (!) 174/99 Pulse: 104   Resp: 20 SpO2: 96 % O2 Device: None (Room air)    Weight: 190 lbs 14.69 oz    Gen - AAO x 3 in NAD.  Lungs - CTA B.  HEart - RR,S1+S2 nml, no m/g/r.  Abd - soft, mild distention, + diffuse ttp, + BS.  Ext - no edema.    Medical Decision Making       60 MINUTES SPENT BY ME on the date of service doing chart review, history, exam, documentation & further activities per the note.      Data     I have personally reviewed the following data over the past 24 hrs:    27.1 (H)  \   9.2 (L)   / 100 (L)     N/A N/A N/A /  292 (H)   N/A N/A N/A \       Imaging results reviewed over the past 24 hrs:   Recent Results (from the past 24 hour(s))   XR Abdomen Port 1 View    Narrative    EXAM: XR ABDOMEN PORT 1 VIEW  LOCATION: [facility]  DATE: 9/12/2023    INDICATION: POD 4 colectomy.  no multiple episodes of emesis.  eval for obstruction  COMPARISON: CT abdomen pelvis 09/26/2021      Impression    IMPRESSION: Dilated loops of small bowel bowel throughout the abdomen, which could indicate diffuse ileus, or small bowel obstruction. Supine technique limits assessment for free air; none is identified. Aortobiiliac graft and embolic " material.

## 2023-09-12 NOTE — PROGRESS NOTES
Cross cover notified that patient has had multiple episodes of green emesis throughout the night despite receiving Zofran and Compazine.  Reviewed chart.  He is postop day 4 following open extended right colectomy for colon cancer.  He was on a clear liquid diet.  Ileus versus obstruction seems most likely.  -Make n.p.o.  -Obtain abdominal x-ray  -Give 0.25 mg IV lorazepam   Applied

## 2023-09-12 NOTE — PLAN OF CARE
End of Shift Summary  For vital signs and complete assessments, please see documentation flowsheets.     Pertinent assessments: Pt A&Ox4. On RA. Elevated BP. C/O nausea & back pain. Had multiple (x4) green emesis episodes.Given IV zofran,compazine, one time dose of lorazepam for nausea. Given morphine IV for pain. Minimal improvement noted. PIV running IVF, 75 mL/hr. B, 283    Major Shift Events: Xray,NG tube & one time dose of lorazepam ordered. Diet moved to NPO     Treatment Plan: Xray. NPO. Pain & nausea management. NG tube placement. Monitor BG    Bedside Nurse: Carson Saldana RN

## 2023-09-12 NOTE — PLAN OF CARE
Pertinent assessments: A&Ox4. VSS on room air, afebrile. C/o abdominal fullness. BS hypoactive, no BM or flatus this shift. Frequent belching. Emesis x1 after taking in small amount of jello and soda, zofran given. Up Ax1 with cane.     Major Shift Events: Pt's wife frustrated with NPO diet. Requested to speak to surgeon. After speaking with surgeon, clear liquid diet placed. Did not tolerate and writer recommended to stay away from oral intake.   Treatment Plan: Pain and nausea management. Monitor for return of bowel function.  Bedside Nurse: Danette John RN

## 2023-09-12 NOTE — PROGRESS NOTES
COLON & RECTAL SURGERY  PROGRESS NOTE    September 12, 2023  Post-op Day # 4    SUBJECTIVE: Patient had multiple episodes of emesis last night. Pain is currently controlled, but is unsure if it's abdominal pain or nausea he feels. No BM or gas. NGT was being placed while in the room.     OBJECTIVE:  Temp:  [97.6  F (36.4  C)-98.2  F (36.8  C)] 98.2  F (36.8  C)  Pulse:  [] 104  Resp:  [16-20] 20  BP: (155-174)/(76-99) 174/99  SpO2:  [96 %-98 %] 96 %    Intake/Output Summary (Last 24 hours) at 9/12/2023 0903  Last data filed at 9/11/2023 1749  Gross per 24 hour   Intake --   Output 125 ml   Net -125 ml       GENERAL:  Awake, alert, no acute distress, sitting up in bed  HEAD: Nomocephalic atraumatic  SCLERA: anicteric  EXTREMITIES: warm and well perfused  ABDOMEN:  Soft, appropriately tender, distended, no rebound or guarding, no peritoneal signs  INCISION:  C/d/I.    LABS:  Lab Results   Component Value Date    WBC 27.1 09/12/2023    WBC 12.7 06/01/2021     Lab Results   Component Value Date    HGB 9.2 09/12/2023    HGB 13.5 06/01/2021     Lab Results   Component Value Date    HCT 30.1 09/12/2023    HCT 41.3 06/01/2021     Lab Results   Component Value Date     09/12/2023     06/01/2021     Last Basic Metabolic Panel:  Lab Results   Component Value Date     09/11/2023     06/01/2021      Lab Results   Component Value Date    POTASSIUM 5.1 09/11/2023    POTASSIUM 3.9 08/02/2022    POTASSIUM 4.2 06/01/2021     Lab Results   Component Value Date    CHLORIDE 98 09/11/2023    CHLORIDE 103 08/02/2022    CHLORIDE 104 06/01/2021     Lab Results   Component Value Date    MAGDALENO 9.1 09/11/2023    MAGDALENO 8.9 06/01/2021     Lab Results   Component Value Date    CO2 23 09/11/2023    CO2 24 08/02/2022    CO2 30 06/01/2021     Lab Results   Component Value Date    BUN 16.9 09/11/2023    BUN 20 08/02/2022    BUN 26 06/01/2021     Lab Results   Component Value Date    CR 1.43 09/11/2023    CR 1.51 06/01/2021      Lab Results   Component Value Date     09/12/2023     08/02/2022     06/01/2021       ASSESSMENT/PLAN: Benji is an 83 year old man who is POD#4 s/p open extended right colectomy for colon cancer.      - NPO/IVF  - NGT placed this morning. CXR to check placement.  - Will hold off on TPN for now.   - Await CBC and CMP  - Pain management as needed  - Encourage ambulation  - Hold SQH for DVT ppx  - Appreciate hospitalist assistance with comorbidities      For questions/paging, please contact the CRS office at [phone].    Beth Tovar PA-C  [facility]  Phone: [phone]

## 2023-09-13 NOTE — PLAN OF CARE
Goal Outcome Evaluation:       To Do:  End of Shift Summary  For vital signs and complete assessments, please see documentation flowsheets.     Pertinent assessments: Pt A/Ox4. VSS. Oxycodone 2x  given for abdo pain, Denies nausea and SOB. NGT to LIS. BS hypo, no BM and flatus. Midline incision and lap site WDL. Up SBA. , 200 and 161. Up SBA  Major Shift Events: none  Treatment Plan: BG Q4, NGT, symptom management and await bowel return.  Bedside Nurse: Mariaelena Castillo RN

## 2023-09-13 NOTE — PLAN OF CARE
Goal Outcome Evaluation:                    To Do:  End of Shift Summary  For vital signs and complete assessments, please see documentation flowsheets.     Pertinent assessments: Pt denies pain, has been up walking in the kemp this shift. Has not passed gas as of yet. Pt alert and orientated x 4. NG remains in place. Strict I & O. Remains NPO.    Major Shift Events Fluid bolus given and IVF changed ( See MAR).     Treatment Plan: Will continue with plan of care, possible TPN bowel function does not return, ( See Provider note)     Bedside Nurse: Celina Saleem RN

## 2023-09-13 NOTE — PROGRESS NOTES
COLON & RECTAL SURGERY  PROGRESS NOTE    September 13, 2023  Post-op Day # 5    SUBJECTIVE: Patient slept better last night. Walked a little yesterday. No current n/v. Had 2.8 L out of NGT yesterday. WBC improved to 12.6. Hgb 7.7.    OBJECTIVE:  Temp:  [97.6  F (36.4  C)-98.6  F (37  C)] 98.6  F (37  C)  Pulse:  [100-108] 108  Resp:  [18-20] 18  BP: (113-153)/(70-73) 153/71  SpO2:  [96 %-97 %] 97 %    Intake/Output Summary (Last 24 hours) at 9/13/2023 0812  Last data filed at 9/13/2023 0641  Gross per 24 hour   Intake 4639 ml   Output 3400 ml   Net 1239 ml       GENERAL:  Awake, alert, no acute distress, lying in bed  HEAD: Nomocephalic atraumatic  SCLERA: anicteric  EXTREMITIES: warm and well perfused  ABDOMEN:  Soft, appropriately tender, distended, no rebound or guarding, no peritoneal signs  INCISION:  C/d/i    LABS:  Lab Results   Component Value Date    WBC 27.1 09/12/2023    WBC 12.7 06/01/2021     Lab Results   Component Value Date    HGB 9.2 09/12/2023    HGB 13.5 06/01/2021     Lab Results   Component Value Date    HCT 30.1 09/12/2023    HCT 41.3 06/01/2021     Lab Results   Component Value Date     09/12/2023     06/01/2021     Last Basic Metabolic Panel:  Lab Results   Component Value Date     09/12/2023     06/01/2021      Lab Results   Component Value Date    POTASSIUM 4.4 09/12/2023    POTASSIUM 3.9 08/02/2022    POTASSIUM 4.2 06/01/2021     Lab Results   Component Value Date    CHLORIDE 97 09/12/2023    CHLORIDE 103 08/02/2022    CHLORIDE 104 06/01/2021     Lab Results   Component Value Date    MAGDALENO 8.9 09/12/2023    MAGDALNEO 8.9 06/01/2021     Lab Results   Component Value Date    CO2 24 09/12/2023    CO2 24 08/02/2022    CO2 30 06/01/2021     Lab Results   Component Value Date    BUN 23.4 09/12/2023    BUN 20 08/02/2022    BUN 26 06/01/2021     Lab Results   Component Value Date    CR 1.57 09/12/2023    CR 1.51 06/01/2021     Lab Results   Component Value Date      09/13/2023     08/02/2022     06/01/2021       ASSESSMENT/PLAN: Benji is an 83 year old man who is POD#5 s/p open extended right colectomy for colon cancer.      - NPO/IVF  - Continue NGT. Hold off on TPN for now.  - Strict Is and Os  - Pain management as needed  - Encourage ambulation  - [facility] for ppx    For questions/paging, please contact the CRS office at [phone].    Beth Tovar PA-C  [facility]  Phone: [phone]    [facility] Attending Note    Patient seen and examined independently.  Agree with above assessment and plan.  Declan NGT out put, > 3L, less overnight   Abd osftly distended, incision CDI  Plan  Continue NGT, IVF, likely TPN tomorrow if NGT output remains high.  Given base like chronic steroids, IV replacement started.  Encourage ambulation  Minimize narcotics, optimize electrolytes to help with ileus.  Hgb back down but no obvious signs of bleeding.     Sandi Carvajal MD  [facility]  [address], [address]  [place] [address]  T: [phone]  F: [phone]   [email or web address]

## 2023-09-13 NOTE — PROGRESS NOTES
[facility]    Medicine Progress Note - Hospitalist Service    Date of Admission:  9/8/2023    Assessment & Plan     Austin Garza is a 83 year old male patient with past medical history of hypertension, GERD, CLL, diabetes mellitus type 2, transverse colon cancer, who was admitted for obstructing elective surgery. Patient underwent elective diagnostic laparoscopy and open extended right colectomy with ileocolic anastomosis for curative intent.  We are consulted for postop diabetes management.     Colon cancer Status post laparoscopic, open extended right colectomy with ileocolic anastomosis and now probable ileus.  Patient had vomiting twice after dose of IV Dilaudid. He also complains of hiccups. WBC elevated at 25. No fever.   -He was seen by colorectal surgery today and put on n.p.o.  -IVF's.  - s/p NG to LIS 9/12.  - NPO.  -Pain medications as needed.  -Currently patient is doing well with no significant pain or fever.  No nausea or vomiting.  Significant nasogastric tube output.  Diet advancement was discussed with colorectal surgery and plan is to wait TPN for now.  We will change IV fluids to D5 half-normal with 20 of potassium.     Diabetes mellitus type 2, insulin requiring  -His PTA medications include insulin glargine 10 units every morning, dulaglutide 4.5 mg subcu every week, insulin sliding scale  -Patient is currently NPO.  -Currently patient is getting sliding scale insulin every 4 hours, Lantus 15 units twice a day.  As patient is n.p.o., will decrease Lantus to 15 units once a day, continue sliding scale insulin,     PAF.  - IV metoprolol prn.  - eliquis on hold.    Polyarthritis.  - On prednisone which she is on hold.  Patient was started on Solu-Cortef injections 40 mg every day.     Hyperlipidemia: We will continue rosuvastatin     GERD: We will continue pantoprazole    Hx of CLL with elevated WCC, low platelets.  - Monitor.    CKD stage 3.  Suspect acute kidney injury  "on chronic kidney disease.  -Serum creatinine increased from 1.43 on September 11-1.75 today.  Avoid nephrotoxins.  Continue IV fluid, monitor intake and output and kidney function.       Diet: NPO for Medical/Clinical Reasons Except for: Meds    --Contacted colorectal surgery regarding the need for TPN.  Plan to reevaluate patient in the morning to consider TPN.    DVT Prophylaxis: Heparin SQ  Siddiqui Catheter: Not present  Lines: None     Cardiac Monitoring: None  Code Status: Full Code      Clinically Significant Risk Factors              # Hypoalbuminemia: Lowest albumin = 2.9 g/dL at 9/13/2023  8:08 AM, will monitor as appropriate   # Thrombocytopenia: Lowest platelets = 83 in last 2 days, will monitor for bleeding     # Hypertension: Noted on problem list         # DMII: A1C = 7.1 % (Ref range: 0.0 - 5.6 %) within past 6 months     # Overweight: Estimated body mass index is 28.18 kg/m  as calculated from the following:    Height as of this encounter: 1.753 m (5' 9.02\").    Weight as of this encounter: 86.6 kg (190 lb 14.7 oz).               Disposition Plan unable to determine at this time.  Colorectal surgery is following.  I suspect he stayed in the hospital for the next several days pending return of bowel function.        Gerry Roberts MD  Hospitalist Service  [facility]  Securely message with [employer] (more info)  Text page via [facility] Paging/Directory   ______________________________________________________________________    Interval History     Patient is seen and examined by me today and medical record reviewed.Overnight events noted and care discussed with nursing staff.  Patient care assumed by this morning.  Patient is feeling well with no fever or pain.  His wife was at the bedside and her questions and concerns addressed as well.  Patient is n.p.o. for several days.  Nasogastric output was significant and IV fluid bolus with LR was ordered by colorectal surgery team.       "     Physical Exam 60  Vital Signs: Temp: 98.6  F (37  C) Temp src: Oral BP: (!) 153/71 Pulse: 108   Resp: 18 SpO2: 97 % O2 Device: None (Room air)    Weight: 190 lbs 14.69 oz    Gen - AAO x 3 in NAD.  Lungs - CTA B.  HEart - RR,S1+S2 nml, no m/g/r.  Abd - soft, mild distention, + diffuse ttp, + BS.  Ext - no edema.    Medical Decision Making       60 MINUTES SPENT BY ME on the date of service doing chart review, history, exam, documentation & further activities per the note.      Data     I have personally reviewed the following data over the past 24 hrs:    12.6 (H)  \   7.7 (L)   / 83 (L)     138 101 24.9 (H) /  177 (H)   4.3 29 1.75 (H) \     ALT: 8 AST: 13 AP: 44 TBILI: 0.5   ALB: 2.9 (L) TOT PROTEIN: 4.8 (L) LIPASE: N/A       Imaging results reviewed over the past 24 hrs:   No results found for this or any previous visit (from the past 24 hour(s)).

## 2023-09-14 NOTE — PLAN OF CARE
Goal Outcome Evaluation:                      To Do:  End of Shift Summary  For vital signs and complete assessments, please see documentation flowsheets.     Pertinent assessments: A&O--up in room and halls --- co pain in throat Hurricane spray given with relief ---states passing flatus and watery  stools ----  Major Shift Events 1 UPC infused without problems ----   CT scan completed able to tolerate contrast   Treatment Plan:  monitor  Bedside Nurse: Mary Rodriguez RN

## 2023-09-14 NOTE — PROGRESS NOTES
"SPIRITUAL HEALTH SERVICES - Progress Note  RH Med/Surg 5    Referral Source: Length of stay.    Oriented pt Benji to [facility].  He shared that his medical history is \"complex\" and that he is \"learning\" from his providers.  Benji conveyed that his spouse is core to his support network and that family and friends are staying in touch especially through his spouse.  He reported that Gnosticism or spirituality does play a significant role in his self-care.  Benji expressed no needs at this time.    Plan: Informed pt how he can request  support.  This author and other chaplains remain available per pt/family request.     Omar Tena M.Div., [facility]  Staff     [facility] routine referrals *[phone]  [facility] available 24/7 for emergent requests/referrals, either by paging the on-call  or by entering an ASAP/STAT consult in Epic (this will also page the on-call ).   "

## 2023-09-14 NOTE — PROGRESS NOTES
[facility]    Medicine Progress Note - Hospitalist Service    Date of Admission:  9/8/2023    Assessment & Plan     Austin Garza is a 83 year old male patient with past medical history of hypertension, GERD, CLL, diabetes mellitus type 2, transverse colon cancer, who was admitted for obstructing elective surgery. Patient underwent elective diagnostic laparoscopy and open extended right colectomy with ileocolic anastomosis for curative intent.      Colon cancer.  Status post laparoscopic, open extended right colectomy with ileocolic anastomosis.  Postoperative ileus.  -Initial surgery 9/8.  -Colorectal surgery following.  -Diet per colorectal surgery.  -Minimize opiates as able.  -NG tube currently remains in place.  -May need TPN soon.  -Pantoprazole 40 mg IV daily.    Chronic steroid use.  Possible adrenal insufficiency.  -Continue IV hydrocortisone.    Acute postoperative anemia on chronic anemia.  -Hemoglobin down to 6.9 today.  -Transfuse 1 unit packed red blood cells.  -Recheck CBC tomorrow.    Diabetes mellitus type 2.  -Continue glargine insulin 15 units a day.  -Aspart insulin sliding scale as needed.    Hypertension.  -Metoprolol 5 mg IV every 6 hours while NPO.    Chronic kidney disease.  -Creatinine stable.  -Avoid nephrotoxins as able.  -Recheck metabolic panel intermittently.    Hyponatremia.  Hypokalemia.  Hypomagnesemia.  Hypocalcemia.  -Start potassium replacement protocol.  -Magnesium replacement protocol.  -Phosphorus replacement protocol.  -Check ionized calcium.  -Continue IV fluids.  -Recheck metabolic panel tomorrow.    Chronic thrombocytopenia.  -Platelets appear to be near baseline.  -Recheck CBC tomorrow.    Hyperlipidemia.  -Hold rosuvastatin.           Diet: NPO for Medical/Clinical Reasons Except for: Meds    DVT Prophylaxis: Pneumatic Compression Devices  Siddiqui Catheter: Not present  Lines: None     Cardiac Monitoring: None  Code Status: Full Code   "    Clinically Significant Risk Factors              # Hypoalbuminemia: Lowest albumin = 2.9 g/dL at 9/13/2023  8:08 AM, will monitor as appropriate   # Thrombocytopenia: Lowest platelets = 73 in last 2 days, will monitor for bleeding   # Hypertension: Noted on problem list       # DMII: A1C = 7.1 % (Ref range: 0.0 - 5.6 %) within past 6 months   # Overweight: Estimated body mass index is 28.18 kg/m  as calculated from the following:    Height as of this encounter: 1.753 m (5' 9.02\").    Weight as of this encounter: 86.6 kg (190 lb 14.7 oz).             Disposition Plan      Expected Discharge Date: 09/17/2023,  3:00 PM    Destination: home with family            Augusto Mejia, DO  Hospitalist Service  [facility]  Securely message with [employer] (more info)  Text page via [employer] Paging/Directory   ______________________________________________________________________    Interval History   Occasional abdominal pain.  Some nausea this morning.  Denies chest pain, shortness of breath, fevers, chills.    Physical Exam   Vital Signs: Temp: 97.5  F (36.4  C) Temp src: Oral BP: 136/71 Pulse: 89   Resp: 20 SpO2: 95 % O2 Device: None (Room air)    Weight: 190 lbs 14.69 oz    Gen:  NAD, A&Ox3.  Eyes:  PERRL, sclera anicteric.  OP:  MMM, no lesions.  Neck:  Supple.  CV:  Regular, no murmurs.  Lung:  CTA b/l, normal effort.  Ab:  +BS, soft.  Skin:  Warm, dry to touch.  No rash.  Ext:  No pitting edema LE b/l.      Medical Decision Making       40 MINUTES SPENT BY ME on the date of service doing chart review, history, exam, documentation & further activities per the note.      Data     I have personally reviewed the following data over the past 24 hrs:    8.6  \   6.9 (LL)   / 73 (L)     136 102 21.3 /  237 (H)   3.7 26 1.45 (H) \       Imaging results reviewed over the past 24 hrs:   No results found for this or any previous visit (from the past 24 hour(s)).  "

## 2023-09-14 NOTE — PROGRESS NOTES
COLON & RECTAL SURGERY  PROGRESS NOTE    September 14, 2023  Post-op Day # 6    SUBJECTIVE:  Improvement in abdominal pain.  Decreased output from NG tube, however remains bilious.  Hemoglobin 6.9 this morning.  Subcutaneous heparin has been held.    OBJECTIVE:  Temp:  [97.5  F (36.4  C)-98.9  F (37.2  C)] 97.5  F (36.4  C)  Pulse:  [] 89  Resp:  [16-20] 20  BP: (116-136)/(58-73) 136/71  SpO2:  [94 %-95 %] 95 %    Intake/Output Summary (Last 24 hours) at 9/14/2023 1410  Last data filed at 9/14/2023 1400  Gross per 24 hour   Intake 3022.34 ml   Output 800 ml   Net 2222.34 ml       GENERAL:  Awake, alert, no acute distress  EXTREMITIES: Warm and well perfused, edema   ABDOMEN:  Soft, appropriately tender, improved distension. No guarding, rigidity, or peritoneal signs.   INCISION:  C/d/i    LABS:  Lab Results   Component Value Date    WBC 8.6 09/14/2023    WBC 12.7 06/01/2021     Lab Results   Component Value Date    HGB 6.9 09/14/2023    HGB 13.5 06/01/2021     Lab Results   Component Value Date    HCT 22.5 09/14/2023    HCT 41.3 06/01/2021     Lab Results   Component Value Date    PLT 73 09/14/2023     06/01/2021     Last Basic Metabolic Panel:  Lab Results   Component Value Date     09/14/2023     06/01/2021      Lab Results   Component Value Date    POTASSIUM 3.7 09/14/2023    POTASSIUM 3.9 08/02/2022    POTASSIUM 4.2 06/01/2021     Lab Results   Component Value Date    CHLORIDE 102 09/14/2023    CHLORIDE 103 08/02/2022    CHLORIDE 104 06/01/2021     Lab Results   Component Value Date    MAGDALENO 8.0 09/14/2023    MAGDALENO 8.9 06/01/2021     Lab Results   Component Value Date    CO2 26 09/14/2023    CO2 24 08/02/2022    CO2 30 06/01/2021     Lab Results   Component Value Date    BUN 21.3 09/14/2023    BUN 20 08/02/2022    BUN 26 06/01/2021     Lab Results   Component Value Date    CR 1.45 09/14/2023    CR 1.51 06/01/2021     Lab Results   Component Value Date     09/14/2023      08/02/2022     06/01/2021       ASSESSMENT/PLAN: Austin Garza is a 83 year old male POD # 6 s/p laparoscopic, converted to open extended right colectomy for management of a T3 N0 colon cancer.     NPO.  Continue NG tube.  We will plan for a CT scan of the abdomen and pelvis with oral contrast to assess for ileus, as well as possible intra-abdominal hematoma given hemoglobin of 6.9 this morning.  If contrast passes into the colon, can plan for a NG clamping trial and possible removal.  If contrast does not progress to the colon, will likely require a PICC line and TPN given prolonged n.p.o. status.  1 unit of packed red blood cells ordered for transfusion.  Plan to check a posttransfusion hemoglobin.  Multimodal pain control   Siddiqui removed and voiding  Pathology results reviewed.  T3N0 cancer.  OOB, ambulate  Hold DVT prophylaxis in the setting of acute blood loss anemia.      For questions/paging, please contact the CRS office at [phone].    Vanessa Frankel MD  Colorectal Surgery    [facility]  [address] [address]  T: [phone]  F: [phone]

## 2023-09-14 NOTE — PLAN OF CARE
Goal Outcome Evaluation:       To Do:  End of Shift Summary  For vital signs and complete assessments, please see documentation flowsheets.     Pertinent assessments: Pt A/Ox4. VSS. C/o sore throart due NGT, Lozenges 2x and Oxycodone given. Denies nausea and SOB. NGT to LIS. Abdo distended and soft. BS hypo, passing flatus and watery stool. , 168 and 191.Up SBA with cane.  Major Shift Events Plt ct 83, paged MD if okay to give Heparin. Dane Solorzano ordered to hold and use PCD tonight.  0708- hgb 6.9. Dr Frankel ordered 1 PRBC to be transfuse.  Treatment Plan: Symptom management, NGT, await bowel return, encourage ambulation and poss start TPN tonight.  Bedside Nurse: Mariaelena Castillo RN

## 2023-09-15 NOTE — PROGRESS NOTES
[facility]    Medicine Progress Note - Hospitalist Service    Date of Admission:  9/8/2023    Assessment & Plan     Austin Garza is a 83 year old male patient with past medical history of hypertension, GERD, CLL, diabetes mellitus type 2, transverse colon cancer, who was admitted for obstructing elective surgery. Patient underwent elective diagnostic laparoscopy and open extended right colectomy with ileocolic anastomosis for curative intent.       Colon cancer.  Status post laparoscopic, open extended right colectomy with ileocolic anastomosis.  Postoperative ileus.  -Initial surgery 9/8.  -Colorectal surgery following.  -Diet per colorectal surgery.  -Minimize opiates as able.  -NG tube currently remains in place.  -Start TPN.  -Stop dextrose in IV fluids.  -Pantoprazole 40 mg IV daily.     Chronic steroid use.  Possible adrenal insufficiency.  -Continue IV hydrocortisone.     Acute postoperative anemia on chronic anemia.  -Hemoglobin down to 6.9 on 10/14.  -Transfused 1 unit packed red blood cells.  -Hemoglobin improved to 8.5.  -Recheck CBC tomorrow.     Diabetes mellitus type 2.  -Continue glargine insulin 15 units a day.  -Aspart insulin sliding scale as needed.     Hypertension.  -Metoprolol 5 mg IV every 6 hours while NPO.     Chronic kidney disease.  -Creatinine improved to 1.3.  -Avoid nephrotoxins as able.  -Recheck metabolic panel intermittently.     Hyponatremia.  Hypokalemia.  Hypomagnesemia.  Hypocalcemia.  -potassium replacement protocol.  -Magnesium replacement protocol.  -Phosphorus replacement protocol.  -ionized calcium low.  -Give calcium gluconate 1 g today.  -Recheck metabolic panel intermittently.     Chronic thrombocytopenia.  -Platelets appear to be near baseline.  -Recheck CBC tomorrow.     Hyperlipidemia.  -Hold rosuvastatin.       Diet: NPO for Medical/Clinical Reasons Except for: Meds  parenteral nutrition - Clinimix E  Diet    DVT Prophylaxis: Pneumatic  "Compression Devices  Siddiqui Catheter: Not present  Lines: PRESENT      PICC 09/15/23 Double Lumen Right Basilic TPN-Site Assessment: WDL      Cardiac Monitoring: None  Code Status: Full Code      Clinically Significant Risk Factors          # Hypocalcemia: Lowest iCa = 4.3 mg/dL in last 2 days, will monitor and replace as appropriate   # Hypomagnesemia: Lowest Mg = 1.6 mg/dL in last 2 days, will replace as needed   # Hypoalbuminemia: Lowest albumin = 2.9 g/dL at 9/13/2023  8:08 AM, will monitor as appropriate   # Thrombocytopenia: Lowest platelets = 70 in last 2 days, will monitor for bleeding   # Hypertension: Noted on problem list       # DMII: A1C = 7.1 % (Ref range: 0.0 - 5.6 %) within past 6 months   # Overweight: Estimated body mass index is 28.18 kg/m  as calculated from the following:    Height as of this encounter: 1.753 m (5' 9.02\").    Weight as of this encounter: 86.6 kg (190 lb 14.7 oz).   # Moderate Malnutrition: based on nutrition assessment           Disposition Plan      Expected Discharge Date: 09/18/2023,  3:00 PM    Destination: home with family            Augusto Mejia, DO  Hospitalist Service  [facility]  Securely message with [employer] (more info)  Text page via [employer] Paging/Directory   ______________________________________________________________________    Interval History   Occasional abdominal discomfort.  Improved from previous.  Denies chest pain, shortness of breath, fevers, chills, nausea, or vomiting.    Physical Exam   Vital Signs: Temp: 98.4  F (36.9  C) Temp src: Oral BP: (!) 141/62 Pulse: 81   Resp: 16 SpO2: 93 % O2 Device: None (Room air)    Weight: 190 lbs 14.69 oz    Gen:  NAD, A&Ox3.  Eyes:  PERRL, sclera anicteric.  OP:  MMM, no lesions.  Neck:  Supple.  CV:  Regular, no murmurs.  Lung:  CTA b/l, normal effort.  Ab:  +BS, soft.  Skin:  Warm, dry to touch.  No rash.  Ext:  No pitting edema LE b/l.      Medical Decision Making       34 MINUTES SPENT BY " ME on the date of service doing chart review, history, exam, documentation & further activities per the note.      Data     I have personally reviewed the following data over the past 24 hrs:    9.4  \   8.5 (L)   / 70 (L)     135 (L) 101 16.4 /  193 (H)   3.5 24 1.28 (H) \       Imaging results reviewed over the past 24 hrs:   Recent Results (from the past 24 hour(s))   CT Abdomen Pelvis w Contrast    Narrative    EXAM: CT ABDOMEN PELVIS W CONTRAST  LOCATION: [facility]  DATE: 9/14/2023    INDICATION: eval for ileus 0 83 year old?male?patient with past medical history of hypertension, GERD, CLL, diabetes mellitus type 2, transverse colon cancer, who was admitted for obstructing elective surgery. Patient underwent elective diagnostic   laparoscopy and open extended right colectomy with ileocolic anastomosis for curative intent.?     COMPARISON: 08/16/2023  TECHNIQUE: CT scan of the abdomen and pelvis was performed following injection of IV contrast. Multiplanar reformats were obtained. Dose reduction techniques were used.  CONTRAST: 95 mL Isovue 370    FINDINGS:   LOWER CHEST: Severe coronary arterial calcification, question prior stent placement within the LAD and circumflex coronary arteries. Mild aortic valve calcification correlate for possible valvular stenosis. Trace left pleural effusion. Mild subsegmental   atelectasis dependently in the lower lobes.    HEPATOBILIARY: Normal.    PANCREAS: Normal.    SPLEEN: Normal.    ADRENAL GLANDS: Normal.    KIDNEYS/BLADDER: Normal.    BOWEL: Diffuse dilation of fluid-filled small bowel loops. There is a small amount of free fluid. Mild pneumoperitoneum compatible with recent surgery. The enterocolic anastomosis within the left upper quadrant is unremarkable status post right   hemicolectomy. Severe distal colonic diverticulosis but no evidence for diverticulitis. Colon is collapsed.    LYMPH NODES: Normal.    VASCULATURE: Prior endovascular repair  of abdominal aortic aneurysm. There are multiple collaterals within the excluded aneurysm sac compatible with treatment for prior endoleak. There is resulting severe artifact obscuring most of the abdominal aorta   and adjacent retroperitoneal structures.    PELVIC ORGANS: Small fat-containing inguinal hernias are seen bilaterally. Prostate gland unremarkable.    MUSCULOSKELETAL: No suspicious bone lesion.      Impression    IMPRESSION:   1.  Interval right hemicolectomy with expected pneumoperitoneum.  2.  Dilated fluid-filled small bowel loops compatible with ileus.  3.  Severe distal colonic diverticulosis.  4.  Trace left effusion and mild bibasilar atelectasis.  5.  Prior abdominal aortic aneurysm repair.

## 2023-09-15 NOTE — CONSULTS
CLINICAL NUTRITION SERVICES  -  ASSESSMENT NOTE      Recommendations:   - Diet per CRS team.  - Initiation of PN per CRS team:    - Access: Central pending  - Frequency: Continuous  - Goal regimen: Central Clinimix (D15, AA5) at 85 mL/hr x 24 hrs (2040 mL) + 250 mL 20% Clinolipids 6x/week  - Provides 1877 kcal (22 kcal/kg), 102 g AA (1.2 g protein/kg), 306 g dextrose (GIR: 2.5), 23% kcal from fat  - Total fluids = 100 mL/hr or per MD  - DW: 85 kg (standing scale wt)  9/15: Once confirmed that MD has changed to non-dextrose IVFs, initiate central clinimix at 60 mL/hr x 24 hrs w/ lipids as outlined (already receiving D5 IVFs at 100 mL/hr for days so ok to start at higher rate)  9/16: Please ensure electrolyte replacements if needed, then at 8 pm ok to increase to goal rate of 85 mL/hr x 24 hrs, lipids as outlined    - Discussed w/ RN, has electrolyte replacement protocol for phosphorus.  Ordered an electrolyte check for tomorrow AM as well.  Please replace per protocol as needed.  - Please ensure that MD has changed to non-dextrose IVFs prior to PN start (MD Samano messaged about change and being on Lantus).     MALNUTRITION:  % Weight Loss:  Up to 5% in 1 month (moderate malnutrition)  % Intake:  </= 50% for >/= 5 days (severe malnutrition)  Subcutaneous Fat Loss:  None observed  Muscle Loss:  Temporal region mild depletion and Acromion bone region mild depletion   Fluid Retention: Trace hand and feet --> weak indicator    Malnutrition Diagnosis: Moderate malnutrition  In Context of:  Acute illness or injury with underlying chronic illness or disease          REASON FOR ASSESSMENT  Austin Garza is a 83 year old male seen by Registered Dietitian for LOS and Pharmacy/Nutrition to Start and Manage PN.    PMH of: CLL, DMII, colon CA, CKD.    Admit 2/2: Planned lap colectomy w/ ileocolic anastomosis on 9/08.      NUTRITION HISTORY  - Information obtained from patient and chart.  - Diet at home: Regular, meals TID is  "baseline.     Breakfast: Cold cereal   Lunch: Fast food   Dinner: Hot or cold deli foods from grocery store  - Lives w/ wife per report.    - Barriers to PO intakes: Describes he's been having appetite changes intermittently over the past at least year resulting in wt loss, very small portions at times.  He also has new CA which likely contributed to wt trending.  - Allergies: NKFA.      CURRENT NUTRITION ORDERS  Diet Order:     NPO    Current Intake/Tolerance:  NPO or liquids during now 7 day admit.  Emesis episodes on 9/11 and 9/12, NGT placed.  Outputs reviewed.  CRS notes read consideration of PN since 9/13, formally consulted this AM.        ANTHROPOMETRICS  Height: 5' 9.016\"  Weight: 190 lbs 14.69 oz  Body mass index is 28.18 kg/m .  Weight Status:  Overweight BMI 25-29.9  Weight History:  Wt Readings from Last 10 Encounters:   09/10/23 86.6 kg (190 lb 14.7 oz)   09/01/23 87.8 kg (193 lb 9.6 oz)   08/31/23 88 kg (194 lb 1.6 oz)   08/23/23 87 kg (191 lb 11.2 oz)   08/17/23 87.1 kg (192 lb)   08/03/23 88 kg (194 lb 1.6 oz)   07/30/23 91.1 kg (200 lb 13.4 oz)   07/26/23 90.8 kg (200 lb 3.2 oz)   07/07/23 91.6 kg (202 lb)   05/30/23 92.8 kg (204 lb 8 oz)     - Has trace hand and foot edema.  - Using dosing wt of 187# from standing scale.  - Wt loss of 7% in the past ~2.5 months.  - Confirmed wt trending w/ patient.  Historically he weighs closer to 245# per his report but was weighing ~220# about a year ago per his report.  He noticed wt loss over the past months w/ lowest weight on home scale of 189# PTA.    LABS: Reviewed:  - Na low at 135  - K WNL this AM  - Magnesium low at 1.6  - Phosphorus slightly low at 2.2   - Reviewed baseline TG, ALT, AST, alk anna, and BUN trending  - BG trending reviewed    MEDICATIONS: Reviewed:  - Noted phosphorus replacement protocol, set to be hung after completing a different infusion per discussion w/ RN  - On D5 IVFs at 100 mL/hr  - Reviewed insulin regimen and noted " Lantus    GI: Stooling patterns noted, documented BMs on 9/14 and this AM?    SKIN: No current documentation of PI.       ASSESSED NUTRITION NEEDS PER APPROVED PRACTICE GUIDELINES:    Dosing Weight 85 kg - standing scale wt  Estimated Energy Needs: >/=1700 kcals - >/=20 Kcal/Kg  Justification: maintenance  Estimated Protein Needs:  grams protein - 1-1.2 g pro/Kg  Justification: preservation of lean body mass  Estimated Fluid Needs: per MD      NUTRITION DIAGNOSIS:  Inadequate oral intake related to altered GI function post-op as evidenced by meeting <50% of nutrition needs acutely x7 days while NPO or on liquids.      NUTRITION INTERVENTIONS  Recommendations / Nutrition Prescription  See above.    Implementation  Nutrition education: Provided education on RD team following for PN start w/ diet and oversight of nutrition-related POC per CRS team.    Collaboration and Referral of Nutrition care: Discussed POC w/ RN and Pharmacy.  Selwyn messaged MD to change to non-dextrose IVFs when PN initiated.      PN Composition and PN Schedule: As above.    Nutrition goals:  PN to reach goal rate w/in 48 hrs.  PN at goal to meet % daily nutrition needs.    MONITORING AND EVALUATION:  Progress towards goals will be monitored and evaluated per protocol and Practice Guidelines          Maria Isabel Bryan RDN, LD  Clinical Dietitian  3rd floor/ICU: [phone]  All other floors: [phone]  Weekend/holiday: [phone]  Office: [phone]

## 2023-09-15 NOTE — PROGRESS NOTES
COLON & RECTAL SURGERY  PROGRESS NOTE    September 15, 2023  Post-op Day # 7    SUBJECTIVE:  Patient feels about the same as yesterday. Pain controlled. +gas and some watery stool. No blood. NGT with 500cc bilious output. No n/v. Hgb 8.5 this morning.    OBJECTIVE:  Temp:  [97.5  F (36.4  C)-98.9  F (37.2  C)] 98.4  F (36.9  C)  Pulse:  [] 89  Resp:  [18-20] 18  BP: (123-167)/(60-79) 141/62  SpO2:  [90 %-97 %] 96 %    Intake/Output Summary (Last 24 hours) at 9/15/2023 0845  Last data filed at 9/15/2023 0728  Gross per 24 hour   Intake 3749.67 ml   Output 3050 ml   Net 699.67 ml       GENERAL:  Awake, alert, no acute distress, lying in his chair  HEAD: Nomocephalic atraumatic  SCLERA: anicteric  EXTREMITIES: warm and well perfused  ABDOMEN:  Soft, appropriately tender, mildly distended, no rebound or guarding, no peritoneal signs  INCISION:  C/d/i    LABS:  Lab Results   Component Value Date    WBC 9.4 09/15/2023    WBC 12.7 06/01/2021     Lab Results   Component Value Date    HGB 8.5 09/15/2023    HGB 13.5 06/01/2021     Lab Results   Component Value Date    HCT 27.7 09/15/2023    HCT 41.3 06/01/2021     Lab Results   Component Value Date    PLT 70 09/15/2023     06/01/2021     Last Basic Metabolic Panel:  Lab Results   Component Value Date     09/15/2023     06/01/2021      Lab Results   Component Value Date    POTASSIUM 3.5 09/15/2023    POTASSIUM 3.9 08/02/2022    POTASSIUM 4.2 06/01/2021     Lab Results   Component Value Date    CHLORIDE 101 09/15/2023    CHLORIDE 103 08/02/2022    CHLORIDE 104 06/01/2021     Lab Results   Component Value Date    MAGDALENO 7.6 09/15/2023    MAGDALENO 8.9 06/01/2021     Lab Results   Component Value Date    CO2 24 09/15/2023    CO2 24 08/02/2022    CO2 30 06/01/2021     Lab Results   Component Value Date    BUN 16.4 09/15/2023    BUN 20 08/02/2022    BUN 26 06/01/2021     Lab Results   Component Value Date    CR 1.28 09/15/2023    CR 1.51 06/01/2021     Lab Results    Component Value Date     09/15/2023     09/15/2023     08/02/2022     06/01/2021       ASSESSMENT/PLAN: edwar Garza is a 83 year old male POD # 7 s/p laparoscopic, converted to open extended right colectomy for management of a T3 N0 colon cancer. Hgb 6.9 yesterday which he received one unit of PRBCs. Hgb 8.5 this morning. CT scan yesterday with some dilated small bowel consistent with an ileus.     - NPO/IVF  - Continue NGT. PICC and TPN to start today.  - Strict Is and Os  - Pain management as needed  - Encourage ambulation  - Continue to hold SQH for ppx.      For questions/paging, please contact the CRS office at [phone].    Beth Tovar PA-C  [facility]  Phone: [phone]    Colon and Rectal Surgery Attending Note    Patient seen and examined independently.  Agree with above assessment and plan.  Feeling better today, up and out of bed already, several loose stools without blood. Decreased NGT output.   CT with dilated loops of small bowel, there is dots of air as expected after surgery.  There is no obvious abscess.  There is air and liquid in the colon.  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  Hgb up appropriately after 1 unit yesterday  Abd soft, round, non-tender, incisions CDI    Plan: Given the ongoing ileus and dilated bowel we will start a PICC and TPN for today.  He is showing signs of improvement however on postop day 7 we will begin nutritional support in anticipation of 4 more days to get a full bowel function.    Sandi Carvajal MD  [facility]  [address], [address]  [place] [address]  T: [phone]  F: [phone]   [email or web address]

## 2023-09-15 NOTE — PLAN OF CARE
Goal Outcome Evaluation:       To Do:  End of Shift Summary  For vital signs and complete assessments, please see documentation flowsheets.     Pertinent assessments: Pt A/Ox4. VSS. C/o sore throat, Benzocaine spray given with minimal effect. Denies nausea and SOB. NGT to LIS. BS faint, per pt only  passing gas when having BM. Had 3x watery stool. , 181 and 162. C/o lightheadedness when getting up from the bed (baseline).Up SBA  Major Shift Events: none  Treatment Plan: await bowel return, symptom management, NGT, BG check Q4.  Bedside Nurse: Mariaelena Castillo RN

## 2023-09-15 NOTE — PLAN OF CARE
End of Shift Summary  For vital signs and complete assessments, please see documentation flowsheets.     Pertinent assessments: Patient A&O x 4. VSS. C/o throat discomfort and cough, PRN albuterol neb x 2 with relief. No nausea and vomiting. NGT to LIS. Ambulated outside room 2x SBA with cane and IV pole for support.    Major Shift Events: PICC placement.     Treatment Plan: TPN advance diet per CRS    Bedside Nurse: Adina Branham RN

## 2023-09-15 NOTE — PROCEDURES
[facility]    Double Lumen PICC Placement    Date/Time: 9/15/2023 1:02 PM    Performed by: Jorge Clark RN  Authorized by: Beth Tovar PA-C  Indications: vascular access (TPN)      UNIVERSAL PROTOCOL   Site Marked: Yes  Prior Images Obtained and Reviewed:  Yes  Required items: Required blood products, implants, devices and special equipment available    Patient identity confirmed:  Verbally with patient, arm band and hospital-assigned identification number (With Vanessa Crowley RN)  NA - No sedation, light sedation, or local anesthesia  Confirmation Checklist:  Patient's identity using two indicators, relevant allergies, procedure was appropriate and matched the consent or emergent situation and correct equipment/implants were available  Time out: Immediately prior to the procedure a time out was called    Universal Protocol: the Joint Commission Universal Protocol was followed    Preparation: Patient was prepped and draped in usual sterile fashion       ANESTHESIA    Anesthesia:  Local infiltration  Local Anesthetic:  Lidocaine 1% without epinephrine  Anesthetic Total (mL):  2      SEDATION    Patient Sedated: No        Preparation: skin prepped with ChloraPrep  Skin prep agent: skin prep agent completely dried prior to procedure  Sterile barriers: maximum sterile barriers were used: cap, mask, sterile gown, sterile gloves, and large sterile sheet  Hand hygiene: hand hygiene performed prior to central venous catheter insertion  Type of line used: PICC  Catheter type: double lumen  Lumen type: power PICC and valved  Lumen Identification: Purple and Red  Catheter size: 5 Fr  Brand: Bard  Lot number: [id number]  Placement method: venipuncture, MST, ultrasound and tip navigation system  Number of attempts: 1  Difficulty threading catheter: no  Successful placement: yes  Orientation: right    Location: basilic vein (4.3mm)  Tip Location: SVC/RA Junction  Arm circumference: adults 10  cm  Extremity circumference: 32  Visible catheter length: 2  Total catheter length: 45  Dressing and securement: alcohol impregnated caps, blood cleaned with CHG, blood removed, chlorhexidine disc applied, site cleansed, subcutaneous anchor securement system and sterile dressing applied  Post procedure assessment: blood return through all ports, free fluid flow and placement verified by 3CG technology  PROCEDURE   Patient Tolerance:  Patient tolerated the procedure well with no immediate complicationsDescribe Procedure:   PICC ok to use, verified with 3cg Tip confirmation, Vanessa Crowley RN informed  Disposal: sharps and needle count correct at the end of procedure, needles and guidewire disposed in sharps container

## 2023-09-16 NOTE — PROGRESS NOTES
COLON & RECTAL SURGERY  PROGRESS NOTE    09/16/2023  Post-op Day # 8    SUBJECTIVE:  Feels ok today, primary complaint is sore throat from his NG tube. Is not in the mood to walk today but agrees to walk once after discussion. He is having liquid Bms.     OBJECTIVE:  Temp:  [98.2  F (36.8  C)-98.7  F (37.1  C)] 98.7  F (37.1  C)  Pulse:  [78-89] 78  Resp:  [18-20] 20  BP: (131-138)/(67-73) 138/67  SpO2:  [94 %-96 %] 95 %    Intake/Output Summary (Last 24 hours) at 9/15/2023 0845  Last data filed at 9/15/2023 0728  Gross per 24 hour   Intake 3749.67 ml   Output 3050 ml   Net 699.67 ml       GENERAL:  Awake, alert, no acute distress, lying in his chair  HEAD: Nomocephalic atraumatic, NGT in nare with bilious output  SCLERA: anicteric  EXTREMITIES: warm and well perfused  ABDOMEN:  Soft, minimally tender, mildly distended, no rebound or guarding  INCISION:  C/d/i    LABS:  Lab Results   Component Value Date    WBC 9.4 09/15/2023    WBC 12.7 06/01/2021     Lab Results   Component Value Date    HGB 8.5 09/15/2023    HGB 13.5 06/01/2021     Lab Results   Component Value Date    HCT 27.7 09/15/2023    HCT 41.3 06/01/2021     Lab Results   Component Value Date    PLT 70 09/15/2023     06/01/2021     Last Basic Metabolic Panel:  Lab Results   Component Value Date     09/15/2023     06/01/2021      Lab Results   Component Value Date    POTASSIUM 3.5 09/15/2023    POTASSIUM 3.9 08/02/2022    POTASSIUM 4.2 06/01/2021     Lab Results   Component Value Date    CHLORIDE 101 09/15/2023    CHLORIDE 103 08/02/2022    CHLORIDE 104 06/01/2021     Lab Results   Component Value Date    MAGDALENO 7.6 09/15/2023    MAGDALENO 8.9 06/01/2021     Lab Results   Component Value Date    CO2 24 09/15/2023    CO2 24 08/02/2022    CO2 30 06/01/2021     Lab Results   Component Value Date    BUN 16.4 09/15/2023    BUN 20 08/02/2022    BUN 26 06/01/2021     Lab Results   Component Value Date    CR 1.28 09/15/2023    CR 1.51 06/01/2021     Lab  Results   Component Value Date     09/15/2023     09/15/2023     08/02/2022     06/01/2021       ASSESSMENT/PLAN: Austin Garza is a 83 year old male POD # 8 s/p laparoscopic converted to open extended right colectomy for management of a T3 N0 colon cancer. His postop course has been notable for prolonged ileus requiring NGT, with PICC placed and TPN initiated yesterday. CT scan on 9/14 with some dilated small bowel consistent with an ileus.     - continue NPO, NGT  - please do gastrografin challenge via NGT tube (ordered). After gastrografin protocol is completed, please place NG tube back to LIWS rather than removing NG tube, we will assess for possible removal in AM  - continue TPN  - Strict Is and Os  - Pain management as needed  - Encourage ambulation, discussed the importance of this with patient  - restart SQH for ppx    Patient d/w Dr. Carvajal.    Daniel Morgan MD, MS  Fellow, Colon & Rectal Surgery  [facility]  09/16/2023  2:11 PM

## 2023-09-16 NOTE — PLAN OF CARE
End of Shift Summary  For vital signs and complete assessments, please see documentation flowsheets.     Pertinent assessments: Pt A/Ox4, SBA with cane, VSS, Benzocaine spray given, 5x watery stool, TPN, Lipids, NGT clamped, Denies nausea and vomiting, denies pain. , 294, 291.    Major Shift Events will have Gastrografin challenge study done, NG clamped Gastrografin administered X-ray to be done this evening started on Heparin injection    Treatment Plan: TPN, pain control, Gastrografin challenge, colorectal surgery consultation  Bedside Nurse: Marcia Jj RN

## 2023-09-16 NOTE — PROGRESS NOTES
[facility]    Medicine Progress Note - Hospitalist Service    Date of Admission:  9/8/2023    Assessment & Plan     Austin Garza is a 83 year old male patient with past medical history of hypertension, GERD, CLL, diabetes mellitus type 2, transverse colon cancer, who was admitted for obstructing elective surgery. Patient underwent elective diagnostic laparoscopy and open extended right colectomy with ileocolic anastomosis for curative intent.       Colon cancer.  Status post laparoscopic, open extended right colectomy with ileocolic anastomosis.  Postoperative ileus.  -Initial surgery 9/8.  -Colorectal surgery following.  -Diet per colorectal surgery.  -Minimize opiates as able.  -NG tube currently remains in place.  -Continue TPN.  Started on 9/15.  -Pantoprazole 40 mg IV daily.     Chronic steroid use.  Possible adrenal insufficiency.  -Continue IV hydrocortisone.  -Likely transition back to prednisone tomorrow.     Acute postoperative anemia on chronic anemia.  -Hemoglobin down to 6.9 on 9/14.  -Transfused 1 unit packed red blood cells.  -Hemoglobin stable at 8.2.  -Recheck CBC intermittently.     Diabetes mellitus type 2.  -Increase glargine insulin to 20 units a day.  -Aspart insulin sliding scale as needed.     Hypertension.  -Metoprolol 5 mg IV every 6 hours while NPO.     Chronic kidney disease.  -Creatinine improved to 1.2.  -Avoid nephrotoxins as able.  -Recheck metabolic panel intermittently.     Hyponatremia.  Hypokalemia.  Hypomagnesemia.  Hypocalcemia.  -potassium replacement protocol.  -Magnesium replacement protocol.  -Phosphorus replacement protocol.  -ionized calcium low.  -Repeat calcium gluconate 1 g today.  -Recheck metabolic panel intermittently.     Chronic thrombocytopenia.  -Platelets appear to be near baseline.  -Recheck CBC intermittently.     Hyperlipidemia.  -Hold rosuvastatin.       Diet: NPO for Medical/Clinical Reasons Except for: Meds  parenteral nutrition  "- Clinimix E  Diet  parenteral nutrition - ADULT compounded formula    DVT Prophylaxis: Heparin SQ  Siddiqui Catheter: Not present  Lines: PRESENT      PICC 09/15/23 Double Lumen Right Basilic TPN-Site Assessment: WDL      Cardiac Monitoring: None  Code Status: Full Code      Clinically Significant Risk Factors        # Hypokalemia: Lowest K = 3.1 mmol/L in last 2 days, will replace as needed   # Hypocalcemia: Lowest iCa = 4.3 mg/dL in last 2 days, will monitor and replace as appropriate   # Hypomagnesemia: Lowest Mg = 1.6 mg/dL in last 2 days, will replace as needed   # Hypoalbuminemia: Lowest albumin = 2.6 g/dL at 9/16/2023  5:47 AM, will monitor as appropriate   # Thrombocytopenia: Lowest platelets = 70 in last 2 days, will monitor for bleeding   # Hypertension: Noted on problem list       # DMII: A1C = 7.1 % (Ref range: 0.0 - 5.6 %) within past 6 months   # Overweight: Estimated body mass index is 28.18 kg/m  as calculated from the following:    Height as of this encounter: 1.753 m (5' 9.02\").    Weight as of this encounter: 86.6 kg (190 lb 14.7 oz).   # Moderate Malnutrition: based on nutrition assessment           Disposition Plan      Expected Discharge Date: 09/19/2023,  3:00 PM    Destination: home with family            Augusto Mejia, DO  Hospitalist Service  [facility]  Securely message with [employer] (more info)  Text page via [employer] Paging/Directory   ______________________________________________________________________    Interval History   Minimal abdominal pain.  Had a bowel movement earlier today.  Denies chest pain, shortness of breath, fevers, chills, nausea, or vomiting.    Physical Exam   Vital Signs: Temp: 98.7  F (37.1  C) Temp src: Oral BP: 138/67 Pulse: 78   Resp: 20 SpO2: 95 % O2 Device: None (Room air)    Weight: 190 lbs 14.69 oz    Gen:  NAD, A&Ox3.  Eyes:  PERRL, sclera anicteric.  OP:  MMM, no lesions.  Neck:  Supple.  CV:  Regular, no murmurs.  Lung:  CTA b/l, " normal effort.  Ab:  +BS, soft.  Skin:  Warm, dry to touch.  No rash.  Ext:  No pitting edema LE b/l.      Medical Decision Making       36 MINUTES SPENT BY ME on the date of service doing chart review, history, exam, documentation & further activities per the note.      Data     I have personally reviewed the following data over the past 24 hrs:    7.9  \   8.2 (L)   / 82 (L)     134 (L) 103 14.7 /  294 (H)   3.1 (L) 24 1.22 (H) \     ALT: 6 AST: 10 AP: 49 TBILI: 0.4   ALB: 2.6 (L) TOT PROTEIN: 4.5 (L) LIPASE: N/A       Imaging results reviewed over the past 24 hrs:   No results found for this or any previous visit (from the past 24 hour(s)).

## 2023-09-16 NOTE — PROGRESS NOTES
"CLINICAL NUTRITION SERVICES - BRIEF NOTE     Nutrition Prescription    RECOMMENDATIONS FOR MDs/PROVIDERS TO ORDER:  None    Malnutrition Status:    Moderate in the context of acute on chronic illness    Recommendations already ordered by Registered Dietitian (RD):  Adjust to custom TPN formula dt elevated BG levels and low K and Phos     TPN w/ 150D/102AA @ 60ml/hr w/ 250 ml 20% lipids over 12 hours daily would provide 1418kcals, 150g CHO, 102g AA, 50g fat, 1440ml fluid daily.     Future/Additional Recommendations:  Plan to increase dextrose in TPN as able to better meet estimated needs pending tolerance per BG levels       EVALUATION OF THE PROGRESS TOWARD GOALS   Diet: NPO  Nutrition Support: Clinimix E 15/5 @ 60ml/hr w/ 250ml 20% lipids 6x/week  Intake: TPN providing 1451kcals, 216g CHO, 72g protein, 43g fat, 1440ml fluid daily.     Sodium Chloride infusion @ 50ml/hr providing 1200ml fluid daily bringing totals to 2640ml fluid daily.     NEW FINDINGS   TPN not yet at goal.     09/16/23 0608 86.6 kg (190 lb 14.7 oz) -- MM   09/10/23 0542 86.6 kg (190 lb 14.7 oz) -- CB   09/08/23 1023 85.1 kg (187 lb 11.2 oz)       Net fluid up 7.6L (16.7lbs) since admit per I/Os    ANTHROPOMETRICS  Height: 175.3 cm (5' 9.016\")  Most Recent Weight: 86.6 kg (190 lb 14.7 oz)    IBW: 70.7 kg  BMI: Overweight BMI 25-29.9  Weight History:   Wt Readings from Last 30 Encounters:   09/16/23 86.6 kg (190 lb 14.7 oz)   09/01/23 87.8 kg (193 lb 9.6 oz)   08/31/23 88 kg (194 lb 1.6 oz)   08/23/23 87 kg (191 lb 11.2 oz)   08/17/23 87.1 kg (192 lb)   08/03/23 88 kg (194 lb 1.6 oz)   07/30/23 91.1 kg (200 lb 13.4 oz)   07/26/23 90.8 kg (200 lb 3.2 oz)   07/07/23 91.6 kg (202 lb)   05/30/23 92.8 kg (204 lb 8 oz)   05/17/23 93 kg (205 lb 1.6 oz)   03/22/23 94.3 kg (208 lb)   02/16/23 94.3 kg (208 lb)   11/28/22 96.6 kg (213 lb)   10/14/22 94.8 kg (208 lb 14.4 oz)   09/23/22 96 kg (211 lb 9.6 oz)   08/02/22 95.6 kg (210 lb 11.2 oz)   07/12/22 96.2 " kg (212 lb)   07/08/22 96.3 kg (212 lb 6.4 oz)   06/22/22 95.3 kg (210 lb)   06/15/22 95.8 kg (211 lb 4.8 oz)   06/09/22 95.7 kg (211 lb)   03/15/22 98.3 kg (216 lb 11.2 oz)   03/10/22 97.3 kg (214 lb 8 oz)   02/15/22 97.1 kg (214 lb)   12/07/21 94.8 kg (209 lb)   11/24/21 95.4 kg (210 lb 6.4 oz)   10/06/21 93 kg (205 lb)   09/20/21 94.6 kg (208 lb 9.6 oz)   09/17/21 94.3 kg (208 lb)     ASSESSED NUTRITION NEEDS PER APPROVED PRACTICE GUIDELINES:     Dosing Weight 85 kg - standing scale wt  Estimated Energy Needs: >/=1700 kcals - >/=20 Kcal/Kg  Justification: maintenance  Estimated Protein Needs:  grams protein - 1-1.2 g pro/Kg  Justification: preservation of lean body mass  Estimated Fluid Needs: 1700ml+ (1ml/kcal) or per MD    PHYSICAL FINDINGS  See malnutrition section below.  Dry skin, scattered bruising arm/hand, surgical incisions abdomen     GI CONCERNS  LBM 9/16 3-4 stools so far today    LABS  Reviewed: -294, Na 134, K 3.1, Cr 1.22, GFR 60, Phos 2.4, alb 2.6, tot pro 4.5, hgb 8.2, hematocrit 26.9, rbc 3.05, MCHC 30.5, RDW 18.1    MEDICATIONS  Reviewed: solu-cortef, Novolog, lantus pen, protonix, potassium chloride, sodium phosphate

## 2023-09-16 NOTE — PLAN OF CARE
To Do:  End of Shift Summary  For vital signs and complete assessments, please see documentation flowsheets.     Pertinent assessments: POD #8 Patient A&O x 4. Denies pain. On RA. Up with SBA and IV pole. Had a BM this shift, voiding adequately. NG at LIS. NPO. Abdominal incisions CDI.    Major Shift Events: none    Treatment Plan: TPN w lipids and IVF    Bedside Nurse: Cheryle Head RN

## 2023-09-17 NOTE — PLAN OF CARE
END OF SHIFT SUMMARY    1900 - 0700    POD # 9. Pt alert and oriented x 4. Denies pain. On RA. Xray-gastrografin completed, NG back to LIS. Up with SBA. Pt had frequent loose stools. TPN, LIPIDS and IVF infusing through PICC line. NPO. Abdominal incisions CDI.

## 2023-09-17 NOTE — PROGRESS NOTES
"Colon & Rectal Surgery Progress Note             Interval History:     Postop Day #: 1 from a extended right hemicolectomy now with resolving ileus.  NG tube with 250 recorded for yesterday.  5 loose bowel movements.  Gastrografin challenge shows contrast in the colon but still has dilated small bowel loops.    Patient denies nausea or vomiting.  He is hungry.                Medications:   I have reviewed this patient's current medications               Physical Exam:   Blood pressure (!) 141/63, pulse 76, temperature 97.5  F (36.4  C), temperature source Oral, resp. rate 20, height 1.753 m (5' 9.02\"), weight 86.6 kg (190 lb 14.7 oz), SpO2 98 %.    Intake/Output Summary (Last 24 hours) at 9/17/2023 0818  Last data filed at 9/16/2023 1750  Gross per 24 hour   Intake --   Output 250 ml   Net -250 ml     GEN: Alert  ABD: Soft and round but much less distended         Data:        Lab Results   Component Value Date     09/17/2023     06/01/2021    Lab Results   Component Value Date    CHLORIDE 105 09/17/2023    CHLORIDE 103 08/02/2022    CHLORIDE 104 06/01/2021    Lab Results   Component Value Date    BUN 17.6 09/17/2023    BUN 20 08/02/2022    BUN 26 06/01/2021      Lab Results   Component Value Date    POTASSIUM 3.5 09/17/2023    POTASSIUM 3.9 08/02/2022    POTASSIUM 4.2 06/01/2021    Lab Results   Component Value Date    CO2 22 09/17/2023    CO2 24 08/02/2022    CO2 30 06/01/2021    Lab Results   Component Value Date    CR 1.17 09/17/2023    CR 1.22 09/16/2023    CR 1.51 06/01/2021    CR 1.57 05/04/2021        Lab Results   Component Value Date    HGB 8.2 (L) 09/16/2023    HGB 8.5 (L) 09/15/2023     Lab Results   Component Value Date    PLT 82 (L) 09/16/2023    PLT 70 (L) 09/15/2023     Lab Results   Component Value Date    WBC 7.9 09/16/2023    WBC 9.4 09/15/2023            Assessment and Plan:     We will plan for NG tube removal and starting gentle clears.  Continue TPN for now given postop day 9 and " poor intake during that time.  Encourage ambulation, minimize narcotics, optimize electrolytes.  On IV steroids now given long-term oral steroids and poor absorption       Sandi Carvajal MD  [facility]  Office Phone # [phone]

## 2023-09-17 NOTE — PROGRESS NOTES
"CLINICAL NUTRITION SERVICES - BRIEF NOTE     Nutrition Prescription    RECOMMENDATIONS FOR MDs/PROVIDERS TO ORDER:  Consider adding Consistent Carb to diet order dt elevated BG levels    Malnutrition Status:    Moderate in the context of acute on chronic illness    Recommendations already ordered by Registered Dietitian (RD):  Continue TPN as ordered dt BG levels still elevated though improved  100mg thiamine as pt is at risk for refeeding syndrome    Future/Additional Recommendations:  Plan to adjust TPN as able to better meet estimated needs pending PO tolerance per BG levels       EVALUATION OF THE PROGRESS TOWARD GOALS   Diet: NPO  Nutrition Support: Compounded TPN w/ 150D/102AA @ 60ml/hr w/ 250 ml 20% lipids over 12 hours daily  Intake: TPN providing 1418kcals, 150g CHO, 102g AA, 50g fat, 1440ml fluid daily.     Sodium Chloride infusion @ 50ml/hr providing 1200ml fluid daily bringing totals to 2640ml fluid daily.     NEW FINDINGS   TPN not yet at goal. Plan to remove NG tube and start \"gentle clears\" per CRS Physician.     09/16/23 0608 86.6 kg (190 lb 14.7 oz) -- MM   09/10/23 0542 86.6 kg (190 lb 14.7 oz) -- CB   09/08/23 1023 85.1 kg (187 lb 11.2 oz)       Net fluid down 0.5L (1.1lbs) since admit per I/Os-question accuracy as inputs haven't been recorded    ANTHROPOMETRICS  Height: 175.3 cm (5' 9.016\")  Most Recent Weight: 86.6 kg (190 lb 14.7 oz)    IBW: 70.7 kg  BMI: Overweight BMI 25-29.9  Weight History:   Wt Readings from Last 30 Encounters:   09/16/23 86.6 kg (190 lb 14.7 oz)   09/01/23 87.8 kg (193 lb 9.6 oz)   08/31/23 88 kg (194 lb 1.6 oz)   08/23/23 87 kg (191 lb 11.2 oz)   08/17/23 87.1 kg (192 lb)   08/03/23 88 kg (194 lb 1.6 oz)   07/30/23 91.1 kg (200 lb 13.4 oz)   07/26/23 90.8 kg (200 lb 3.2 oz)   07/07/23 91.6 kg (202 lb)   05/30/23 92.8 kg (204 lb 8 oz)   05/17/23 93 kg (205 lb 1.6 oz)   03/22/23 94.3 kg (208 lb)   02/16/23 94.3 kg (208 lb)   11/28/22 96.6 kg (213 lb)   10/14/22 94.8 kg " (208 lb 14.4 oz)   09/23/22 96 kg (211 lb 9.6 oz)   08/02/22 95.6 kg (210 lb 11.2 oz)   07/12/22 96.2 kg (212 lb)   07/08/22 96.3 kg (212 lb 6.4 oz)   06/22/22 95.3 kg (210 lb)   06/15/22 95.8 kg (211 lb 4.8 oz)   06/09/22 95.7 kg (211 lb)   03/15/22 98.3 kg (216 lb 11.2 oz)   03/10/22 97.3 kg (214 lb 8 oz)   02/15/22 97.1 kg (214 lb)   12/07/21 94.8 kg (209 lb)   11/24/21 95.4 kg (210 lb 6.4 oz)   10/06/21 93 kg (205 lb)   09/20/21 94.6 kg (208 lb 9.6 oz)   09/17/21 94.3 kg (208 lb)     ASSESSED NUTRITION NEEDS PER APPROVED PRACTICE GUIDELINES:     Dosing Weight 86.6 kg   Estimated Energy Needs: 1880 kcals ([place] [address] Jeor)  Justification: maintenance, activity factor 1.2  Estimated Protein Needs:  grams protein - 1-1.2 g pro/Kg  Justification: preservation of lean body mass, CKD  Estimated Fluid Needs: 1800ml+ (1ml/kcal) or per MD    PHYSICAL FINDINGS  See malnutrition section below.  Dry skin, scattered bruising arm/hand, surgical incisions abdomen, scattered abrasions/excoriation     GI CONCERNS  Abdominal Pain, Bloating, and LBM 9/17 12 stools yesterday, 2 so far today    LABS  Reviewed: -294    MEDICATIONS  Reviewed: calcium gluconate, solu-cortef, Novolog, lantus pen, protonix, potassium chloride

## 2023-09-17 NOTE — PLAN OF CARE
End of Shift Summary  For vital signs and complete assessments, please see documentation flowsheets.     Pertinent assessments: Pt A&Ox4, up with SBA with cane. NG tube removed, tolerating clear liquid diet with no c/o pain or nausea. X2 BM this shift. Ambulating well in kemp. PICC line infusing TPN and NS. Electrolytes WNL. BG stable.    Major Shift Events: NG removed, started clears    Treatment Plan: advance diet, wean TPN, pain control

## 2023-09-17 NOTE — PROGRESS NOTES
[facility]    Medicine Progress Note - Hospitalist Service    Date of Admission:  9/8/2023    Assessment & Plan     Austin Garza is a 83 year old male patient with past medical history of hypertension, GERD, CLL, diabetes mellitus type 2, transverse colon cancer, who was admitted for obstructing elective surgery. Patient underwent elective diagnostic laparoscopy and open extended right colectomy with ileocolic anastomosis for curative intent.       Colon cancer.  Status post laparoscopic, open extended right colectomy with ileocolic anastomosis.  Postoperative ileus.  -Initial surgery 9/8.  -Colorectal surgery following.  -Diet per colorectal surgery.  -Minimize opiates as able.  -NG tube being taken out today, 9/17.  -Starting clear liquids.  -Continue TPN.  Started on 9/15.  -Change pantoprazole to oral 40 mg a day.     Chronic steroid use.  Possible adrenal insufficiency.  -Had IV hydrocortisone on 9/17.  -Stop IV hydrocortisone at this point.  -Restart prednisone 10 mg a day on 9/18.     Acute postoperative anemia on chronic anemia.  -Hemoglobin down to 6.9 on 9/14.  -Transfused 1 unit packed red blood cells.  -Hemoglobin stable at 8.2.  -Recheck CBC intermittently.     Diabetes mellitus type 2.  -Increase glargine insulin to 20 units a day.  -Aspart insulin sliding scale as needed.     Hypertension.  -Stop IV metoprolol.  -Restart metoprolol 25 mg twice a day.       Chronic kidney disease.  -Creatinine stable at 1.2.  -Avoid nephrotoxins as able.  -Recheck metabolic panel intermittently.     Hyponatremia.  Hypokalemia.  Hypomagnesemia.  Hypocalcemia.  -potassium replacement protocol.  -Magnesium replacement protocol.  -Phosphorus replacement protocol.  -ionized calcium low.  -Had calcium gluconate 1 g IV on 9/15 and 9/16.  -Recheck metabolic panel intermittently.     Chronic thrombocytopenia.  -Platelets appear to be near baseline.  -Recheck CBC intermittently.    "  Hyperlipidemia.  -Hold rosuvastatin.       Diet: Diet  parenteral nutrition - ADULT compounded formula  Clear Liquid Diet  parenteral nutrition - ADULT compounded formula    DVT Prophylaxis: Heparin SQ  Siddiqui Catheter: Not present  Lines: PRESENT      PICC 09/15/23 Double Lumen Right Basilic TPN-Site Assessment: WDL      Cardiac Monitoring: None  Code Status: Full Code      Clinically Significant Risk Factors        # Hypokalemia: Lowest K = 3.1 mmol/L in last 2 days, will replace as needed       # Hypoalbuminemia: Lowest albumin = 2.6 g/dL at 9/16/2023  5:47 AM, will monitor as appropriate   # Thrombocytopenia: Lowest platelets = 82 in last 2 days, will monitor for bleeding   # Hypertension: Noted on problem list       # DMII: A1C = 7.1 % (Ref range: 0.0 - 5.6 %) within past 6 months   # Overweight: Estimated body mass index is 28.18 kg/m  as calculated from the following:    Height as of this encounter: 1.753 m (5' 9.02\").    Weight as of this encounter: 86.6 kg (190 lb 14.7 oz).   # Moderate Malnutrition: based on nutrition assessment           Disposition Plan      Expected Discharge Date: 09/20/2023,  3:00 PM    Destination: home with family            Augusto Mejia, DO  Hospitalist Service  [facility]  Securely message with [employer] (more info)  Text page via Scripted Paging/Directory   ______________________________________________________________________    Interval History     Continues to have loose bowel movements.  Minimal abdominal discomfort at times.  Denies chest pain, shortness of breath, fevers, chills, nausea, or vomiting.     Physical Exam   Vital Signs: Temp: 97.5  F (36.4  C) Temp src: Oral BP: (!) 141/63 Pulse: 76   Resp: 20 SpO2: 98 % O2 Device: None (Room air)    Weight: 190 lbs 14.69 oz    Gen:  NAD, A&Ox3.  Eyes:  PERRL, sclera anicteric.  OP:  MMM, no lesions.  Neck:  Supple.  CV:  Regular, no murmurs.  Lung:  CTA b/l, normal effort.  Ab:  +BS, soft.  Skin:  " Warm, dry to touch.  No rash.  Ext:  No pitting edema LE b/l.      Medical Decision Making       40 MINUTES SPENT BY ME on the date of service doing chart review, history, exam, documentation & further activities per the note.      Data     I have personally reviewed the following data over the past 24 hrs:    N/A  \   N/A   / N/A     137 105 17.6 /  201 (H)   3.5 22 1.17 \       Imaging results reviewed over the past 24 hrs:   Recent Results (from the past 24 hour(s))   XR Gastrografin Challenge    Narrative    EXAM: XR GASTROGRAFIN CHALLENGE  LOCATION: [facility]  DATE: 9/17/2023    INDICATION: Small Bowel Obstruction  COMPARISON: CT 09/14/2023.      Impression    IMPRESSION: 8 hours post contrast. There is contrast material within multiple dilated small bowel loops as well as nondilated colon to the sigmoid colon. NG tube in the stomach.

## 2023-09-17 NOTE — CONSULTS
Care Management Initial Consult    General Information  Assessment completed with: Spouse or significant otherlibrado  Type of CM/SW Visit: Initial Assessment    Primary Care Provider verified and updated as needed: Yes   Readmission within the last 30 days: unable to assess      Reason for Consult: discharge planning  Advance Care Planning: Advance Care Planning Reviewed: present on chart          Communication Assessment  Patient's communication style: spoken language (English or Bilingual)    Hearing Difficulty or Deaf: yes   Wear Glasses or Blind: yes    Cognitive  Cognitive/Neuro/Behavioral: WDL  Level of Consciousness: alert  Arousal Level: opens eyes spontaneously  Orientation: oriented x 4  Mood/Behavior: calm, cooperative  Best Language: 0 - No aphasia  Speech: clear, logical    Living Environment:   People in home: spouse     Current living Arrangements: house      Able to return to prior arrangements: yes  Living Arrangement Comments: all needs can be met on one level once up the staris.    Family/Social Support:  Care provided by: self  Provides care for: no one, unable/limited ability to care for self  Marital Status:   Wife  Librado       Description of Support System: Supportive, Involved    Support Assessment: Adequate family and caregiver support, Adequate social supports    Current Resources:   Patient receiving home care services: No     Community Resources: Other (see comment) (has a RN that is a community support through [employer] but does not do home visit)  Equipment currently used at home: cane, straight , wife has a walker with a seat in her car, grab bars in the bathroom, tall toilet   Supplies currently used at home: None  Wife was asking about pull ups, wipes and urp bags  Spoke with her that the supplies she is needed she will need to purchase at a store     Employment/Financial:  Employment Status: retired        Financial Concerns: No concerns identified   Referral to Financial  Worker: No       Does the patient's insurance plan have a 3 day qualifying hospital stay waiver?  No    Lifestyle & Psychosocial Needs:  Social Determinants of Health     Tobacco Use: Medium Risk (9/11/2023)    Patient History     Smoking Tobacco Use: Former     Smokeless Tobacco Use: Former     Passive Exposure: Not on file   Alcohol Use: Not on file   Financial Resource Strain: Not on file   Food Insecurity: Not on file   Transportation Needs: Not on file   Physical Activity: Not on file   Stress: Not on file   Social Connections: Not on file   Intimate Partner Violence: Not At Risk (2/16/2023)    Humiliation, Afraid, Rape, and Kick questionnaire     Fear of Current or Ex-Partner: No     Emotionally Abused: No     Physically Abused: No     Sexually Abused: No   Depression: At risk (5/30/2023)    PHQ-2     PHQ-2 Score: 3   Housing Stability: Not on file       Functional Status:  Prior to admission patient needed assistance:   Dependent ADLs:: Ambulation-cane, Ambulation-walker  Dependent IADLs:: Independent, Cooking, Cleaning       Mental Health Status:  Mental Health Status: No Current Concerns       Chemical Dependency Status:  Chemical Dependency Status: No Current Concerns             Values/Beliefs:  Spiritual, Cultural Beliefs, Yazidi Practices, Values that affect care: no               Additional Information:  Consult placed per Wife's request.   Met with wife outside room as pt was sleeping. Prior to admission wife was helping to manage cooking, cleaning and other needs as they arise. Pt has been able to manage at home with his cane but has access to a walker with seat.  Wife is concerned about getting pt up the 7 steps once home and getting in/out of the car given length of stay.  SW explained that we do not have a service that can assist with the stairs. Pt is not appropriate for stretcher transport and H/W W/C transport staff do not assist with this need. Marcie stated she could ask her nephew if needed  but he lives in [place]. Marcie stated that they understand at some point they will need to move. They like their current home and are not ready to leave.  Marcie requested to meet with the dietician as she has questions about nutrition and diet Benji should follow once home. Marcie requested SW reach out to ajay Cerrato from [employer] [phone]- called and left VM message.   Paged MD to request PT eval for stair assessment. It would be helpful for wife to be present. She tends to be here between 11-6 daily.  Marcie also requested that Benji receive a shower before he returns home. SW encouraged her to have Benji speak with his RN staff when he is ready for this.    Unless PT staff have concerns about Benij's mobility., SW does not anticipate any further needs    Corinne C. White, hospitals  Care [facility]   [phone]

## 2023-09-18 NOTE — PROGRESS NOTES
09/18/23 1134   Appointment Info   Signing Clinician's Name / Credentials (PT) Maryam Mckeon DPT   Living Environment   People in Home spouse   Current Living Arrangements house   Home Accessibility stairs to enter home;stairs within home   Number of Stairs, Main Entrance 1   Stair Railings, Main Entrance railings safe and in good condition   Number of Stairs, Within Home, Primary seven   Stair Railings, Within Home, Primary railings safe and in good condition   Transportation Anticipated family or friend will provide   Living Environment Comments Pt lives with spouse in split level house, 1STE, 7 stairs to bedroom/bathroom/kitchen.   Self-Care   Usual Activity Tolerance good   Current Activity Tolerance moderate   Equipment Currently Used at Home cane, straight;walker, rolling   Fall history within last six months no   Activity/Exercise/Self-Care Comment Pt uses cane at baseline, has access to 4WW. Independent with ADLs and IADLs at baseline.   General Information   Onset of Illness/Injury or Date of Surgery 09/08/23   Referring Physician Augusto Mejia D, DO   Patient/Family Therapy Goals Statement (PT) return home   Pertinent History of Current Problem (include personal factors and/or comorbidities that impact the POC) Austin Garza is a 83 year old male patient with past medical history of hypertension, GERD, CLL, diabetes mellitus type 2, transverse colon cancer, who was admitted for obstructing elective surgery. Patient underwent elective diagnostic laparoscopy and open extended right colectomy with ileocolic anastomosis for curative intent.   Existing Precautions/Restrictions fall;abdominal   Cognition   Affect/Mental Status (Cognition) WFL   Orientation Status (Cognition) oriented x 4   Follows Commands (Cognition) WFL   Integumentary/Edema   Integumentary/Edema Comments normal aging changes   Posture    Posture Forward head position;Protracted shoulders   Range of Motion (ROM)   Range of Motion  ROM is WFL   Strength (Manual Muscle Testing)   Strength (Manual Muscle Testing) strength is WFL   Bed Mobility   Comment, (Bed Mobility) not observed.   Transfers   Comment, (Transfers) SBA with cane sit<>stand   Gait/Stairs (Locomotion)   Comment, (Gait/Stairs) CGA and cane ambulation and stairs   Balance   Balance Comments requires AD for external support to improve stability   Clinical Impression   Criteria for Skilled Therapeutic Intervention Yes, treatment indicated   PT Diagnosis (PT) impaired mobility   Influenced by the following impairments decreased activity tolerance, impaired balance   Functional limitations due to impairments impaired bed mobility, transfers, ambulation, stairs   Clinical Presentation (PT Evaluation Complexity) Stable/Uncomplicated   Clinical Presentation Rationale clinical judgement, chart review   Clinical Decision Making (Complexity) low complexity   Planned Therapy Interventions (PT) balance training;bed mobility training;gait training;neuromuscular re-education;home exercise program;patient/family education;stair training;strengthening;transfer training;progressive activity/exercise;risk factor education;home program guidelines   Anticipated Equipment Needs at Discharge (PT) walker, rolling;cane, straight   Risk & Benefits of therapy have been explained evaluation/treatment results reviewed;care plan/treatment goals reviewed;risks/benefits reviewed;current/potential barriers reviewed;participants voiced agreement with care plan;participants included;patient   PT Total Evaluation Time   PT Eval, Low Complexity Minutes (88552) 6   Plan of Care Review   Plan of Care Reviewed With patient;spouse   Physical Therapy Goals   PT Frequency One time eval and treatment only   PT Predicted Duration/Target Date for Goal Attainment 09/19/23   PT Goals Transfers;Gait;Stairs   PT: Transfers Supervision/stand-by assist;Sit to/from stand;Assistive device;Within precautions;Goal Met   PT: Gait  Supervision/stand-by assist;Straight cane;150 feet;Goal Met   PT: Stairs Supervision/stand-by assist;7 stairs;Rail on right;Goal Met;Assistive device   Interventions   Interventions Quick Adds Gait Training;Therapeutic Activity   Therapeutic Activity   Therapeutic Activities: dynamic activities to improve functional performance Minutes (84658) 4   Gait Training   Gait Training Minutes (91873) 12   PT Discharge Planning   PT Plan discharge   PT Discharge Recommendation (DC Rec) home with assist   PT Rationale for DC Rec Pt presenting close to baseline level of function, pt Bhumika at baseline and currently Bhumika-SBA with cane for mobility. Recommend pt return home with assist from wife with mobility as needed.   PT Brief overview of current status SBA with cane, continue ambulating with nursing until discharge    Total Session Time   Timed Code Treatment Minutes 16   Total Session Time (sum of timed and untimed services) 22

## 2023-09-18 NOTE — PROGRESS NOTES
COLON & RECTAL SURGERY  PROGRESS NOTE    September 18, 2023  Post-op Day # 10    SUBJECTIVE:  Patient feeling better. He has been tolerating clears without nausea/vomiting. +BM/gas. Walking as tolerated.     OBJECTIVE:  Temp:  [97.7  F (36.5  C)-99  F (37.2  C)] 98.1  F (36.7  C)  Pulse:  [93-97] 93  Resp:  [20] 20  BP: (130-169)/(60-81) 135/70  SpO2:  [96 %-97 %] 97 %    Intake/Output Summary (Last 24 hours) at 9/18/2023 1056  Last data filed at 9/18/2023 0651  Gross per 24 hour   Intake --   Output 300 ml   Net -300 ml       GENERAL:  Awake, alert, no acute distress, sitting up in his chair  HEAD: Nomocephalic atraumatic  SCLERA: anicteric  EXTREMITIES: warm and well perfused  ABDOMEN:  Soft, appropriately tender, mildly distended, no rebound or guarding, no peritoneal signs  INCISION:  C/d/I.     LABS:  Lab Results   Component Value Date    WBC 9.9 09/18/2023    WBC 12.7 06/01/2021     Lab Results   Component Value Date    HGB 8.0 09/18/2023    HGB 13.5 06/01/2021     Lab Results   Component Value Date    HCT 25.7 09/18/2023    HCT 41.3 06/01/2021     Lab Results   Component Value Date    PLT 65 09/18/2023     06/01/2021     Last Basic Metabolic Panel:  Lab Results   Component Value Date     09/18/2023     06/01/2021      Lab Results   Component Value Date    POTASSIUM 3.0 09/18/2023    POTASSIUM 3.9 08/02/2022    POTASSIUM 4.2 06/01/2021     Lab Results   Component Value Date    CHLORIDE 103 09/18/2023    CHLORIDE 103 08/02/2022    CHLORIDE 104 06/01/2021     Lab Results   Component Value Date    MAGDALENO 7.6 09/18/2023    MAGDALENO 8.9 06/01/2021     Lab Results   Component Value Date    CO2 21 09/18/2023    CO2 24 08/02/2022    CO2 30 06/01/2021     Lab Results   Component Value Date    BUN 20.2 09/18/2023    BUN 20 08/02/2022    BUN 26 06/01/2021     Lab Results   Component Value Date    CR 1.11 09/18/2023    CR 1.51 06/01/2021     Lab Results   Component Value Date     09/18/2023      09/18/2023     08/02/2022     06/01/2021       ASSESSMENT/PLAN: Austin Garza is a 83 year old male POD # 10 s/p laparoscopic converted to open extended right colectomy for management of a T3 N0 colon cancer. His postop course has been notable for prolonged ileus requiring NGT, with PICC placed and TPN. CT scan on 9/14 with some dilated small bowel consistent with an ileus.     - Full liquids  - Continue TPN until adequate oral intake  - Strict Is and Os  - Pain management as needed  - Encourage ambulation, discussed the importance of this with patient  - [facility] for ppx      For questions/paging, please contact the CRS office at [phone].    Beth Tovar PA-C  [facility]  Phone: [phone]    [facility] Attending Note    Agree with above assessment and plan.  Continue supportive cares, advance diet.  Continue TPN for now.     Sandi Carvajal MD  [facility]  [id number] [place] [address], [address]  [place] [address]  T: [phone]  F: [phone]   [email or web address]

## 2023-09-18 NOTE — PLAN OF CARE
"END OF SHIFT SUMMARY    1900 - 0700  /60 (BP Location: Left arm)   Pulse 94   Temp 99  F (37.2  C) (Oral)   Resp 20   Ht 1.753 m (5' 9.02\")   Wt 86.6 kg (190 lb 14.7 oz)   SpO2 96%   BMI 28.18 kg/m       Pt alert and oriented x 4. On RA. Denies pain. Abdominal incisions CDI. Tolerating diet. Voiding adequately. TPN, lipids and IVF infusing through PICC line. Isolation precautions maintained. Phosphorus replacement initiated.     "

## 2023-09-18 NOTE — PROGRESS NOTES
Care Management Follow Up    Length of Stay (days): 10    Expected Discharge Date: 09/20/2023     Concerns to be Addressed: discharge planning     Patient plan of care discussed at interdisciplinary rounds: Yes    Anticipated Discharge Disposition: Home, Home Care     Anticipated Discharge Services: None  Anticipated Discharge DME: None    Patient/family educated on Medicare website which has current facility and service quality ratings: no  Patient/Family in Agreement with the Plan: yes    Additional Information:  CM following for discharge planning. CM received call this AM from [employer] [phone]  who informed CM that pt wife is concerned re: navigating stairs when they arrive home after discharge. PT consulted and planned to see patient today. Per chart review nephew may be able to assist with transfer into the home. [employer]  also shared they have a home care service that could assist but would be private pay, if desired their contact info is [phone] . Awaiting PT recs after today's session.     Addendum 1145: Per PT, session went well and patient safe to discharge home with assist from wife. Wife was present for session.    Oumou Cash, RN, BSN  Inpatient Care Coordination  [facility]  [phone]

## 2023-09-18 NOTE — PROGRESS NOTES
[facility]    Medicine Progress Note - Hospitalist Service    Date of Admission:  9/8/2023    Assessment & Plan     Austin Garza is a 83 year old male patient with past medical history of hypertension, GERD, CLL, diabetes mellitus type 2, transverse colon cancer, who was admitted for obstructing elective surgery. Patient underwent elective diagnostic laparoscopy and open extended right colectomy with ileocolic anastomosis for curative intent.       Colon cancer.  Status post laparoscopic, open extended right colectomy with ileocolic anastomosis.  Postoperative ileus.  -Initial surgery 9/8.  -Colorectal surgery following.  -Diet per colorectal surgery.  -Minimize opiates as able.  -NG tube taken out 9/17.  -Advance to full liquid diet today.  -Continue TPN.  Started on 9/15.  -Change pantoprazole to oral 40 mg a day.     Chronic steroid use.  Possible adrenal insufficiency.  -Had been on IV hydrocortisone postoperatively while NPO.  -Restart prednisone 10 mg a day on 9/18.     Acute postoperative anemia on chronic anemia.  -Hemoglobin down to 6.9 on 9/14.  -Transfused 1 unit packed red blood cells.  -Hemoglobin stable at 8.  -Recheck CBC intermittently.     Diabetes mellitus type 2.  -Continue glargine insulin 20 units a day.  -Aspart insulin sliding scale as needed.     Hypertension.  -Increase metoprolol to 50 mg twice a day.       Chronic kidney disease.  -Creatinine stable at 1.1.  -Avoid nephrotoxins as able.  -Recheck metabolic panel intermittently.     Hyponatremia.  Hypokalemia.  Hypomagnesemia.  Hypocalcemia.  -potassium replacement protocol.  -Magnesium replacement protocol.  -Phosphorus replacement protocol.  -ionized calcium low.  -Had calcium gluconate 1 g IV on 9/15 and 9/16.  -Now start calcium carbonate 500 mg twice a day.  -Recheck metabolic panel intermittently.     Chronic thrombocytopenia.  -Platelets appear to be near baseline.  -Recheck CBC intermittently.    "  Hyperlipidemia.  -Hold rosuvastatin.     Wife updated at bedside on 9/18.       Diet: Diet  parenteral nutrition - ADULT compounded formula  Full Liquid Diet  parenteral nutrition - ADULT compounded formula    DVT Prophylaxis: Heparin SQ  Siddiqui Catheter: Not present  Lines: PRESENT      PICC 09/15/23 Double Lumen Right Basilic TPN-Site Assessment: WDL      Cardiac Monitoring: None  Code Status: Full Code      Clinically Significant Risk Factors        # Hypokalemia: Lowest K = 3 mmol/L in last 2 days, will replace as needed       # Hypoalbuminemia: Lowest albumin = 2.4 g/dL at 9/18/2023  6:17 AM, will monitor as appropriate   # Thrombocytopenia: Lowest platelets = 65 in last 2 days, will monitor for bleeding   # Hypertension: Noted on problem list       # DMII: A1C = 7.1 % (Ref range: 0.0 - 5.6 %) within past 6 months   # Overweight: Estimated body mass index is 28.18 kg/m  as calculated from the following:    Height as of this encounter: 1.753 m (5' 9.02\").    Weight as of this encounter: 86.6 kg (190 lb 14.7 oz).   # Moderate Malnutrition: based on nutrition assessment           Disposition Plan      Expected Discharge Date: 09/20/2023,  3:00 PM    Destination: home with family            Augusto GROSSJuan M Mejia,   Hospitalist Service  [facility]  Securely message with [email or web address] (more info)  Text page via [facility] Paging/Directory   ______________________________________________________________________    Interval History   Minimal abdominal pain.  Denies chest pain, shortness of breath, fevers, chills, nausea, or vomiting.    Physical Exam   Vital Signs: Temp: 98.1  F (36.7  C) Temp src: Oral BP: 135/70 Pulse: 93   Resp: 20 SpO2: 97 % O2 Device: None (Room air)    Weight: 190 lbs 14.69 oz    Gen:  NAD, A&Ox3.  Eyes:  PERRL, sclera anicteric.  OP:  MMM, no lesions.  Neck:  Supple.  CV:  Regular, no murmurs.  Lung:  CTA b/l, normal effort.  Ab:  +BS, soft.  Skin:  Warm, dry to touch.  No " rash.  Ext:  No pitting edema LE b/l.      Medical Decision Making       38 MINUTES SPENT BY ME on the date of service doing chart review, history, exam, documentation & further activities per the note.      Data     I have personally reviewed the following data over the past 24 hrs:    9.9  \   8.0 (L)   / 65 (L)     130 (L) 103 20.2 /  188 (H)   3.0 (L) 21 (L) 1.11 \     ALT: 7 AST: 8 AP: 50 TBILI: 0.4   ALB: 2.4 (L) TOT PROTEIN: 4.5 (L) LIPASE: N/A     INR:  1.09 PTT:  N/A   D-dimer:  N/A Fibrinogen:  N/A       Imaging results reviewed over the past 24 hrs:   No results found for this or any previous visit (from the past 24 hour(s)).

## 2023-09-18 NOTE — PLAN OF CARE
Goal Outcome Evaluation:    End of Shift Summary  For vital signs and complete assessments, please see documentation flowsheets.     Pertinent assessments: Pt A&Ox4, up with SBA with cane. Tolerated Full liquid diet with no c/o nausea or vomiting. PICC line infusing TPN and NS. Ambulated outside kemp x2 with cane. X5 BM this shift. K and Phos replaced; K rechecked WNL.    Major Shift Events: none    Treatment Plan: wean TPN. IVF. Electrolyte management.     Bedside Nurse: Adina Branham RN

## 2023-09-18 NOTE — PROGRESS NOTES
Clinical Nutrition Brief Note     Please refer to previous RD notes for more information.     Labs:  Electrolytes  Potassium (mmol/L)   Date Value   09/18/2023 3.0 (L)   09/17/2023 3.5   09/16/2023 3.6   08/02/2022 3.9   06/29/2022 4.2   06/06/2022 4.3   06/01/2021 4.2   05/04/2021 4.2   04/05/2021 4.4     Phosphorus (mg/dL)   Date Value   09/18/2023 2.4 (L)   09/17/2023 2.8   09/16/2023 2.4 (L)   09/15/2023 2.2 (L)   09/13/2023 3.1    Blood Glucose  Glucose (mg/dL)   Date Value   09/18/2023 213 (H)   08/02/2022 249 (H)   06/29/2022 146 (H)   06/06/2022 223 (H)   03/07/2022 191 (H)   02/15/2022 152 (H)   06/01/2021 215 (H)   05/04/2021 211 (H)   04/05/2021 207 (H)   03/03/2021 176 (H)   02/01/2021 231 (H)     GLUCOSE BY METER POCT (mg/dL)   Date Value   09/18/2023 197 (H)   09/18/2023 196 (H)   09/18/2023 160 (H)   09/17/2023 165 (H)   09/17/2023 261 (H)     Hemoglobin A1C (%)   Date Value   07/07/2023 7.1 (H)   08/02/2022 6.4 (H)   06/22/2022 6.1 (H)   02/15/2022 6.2 (H)   07/30/2021 7.0 (H)   04/05/2021 5.9 (H)   01/15/2021 6.7 (H)   08/23/2020 5.6   07/14/2020 5.5   12/31/2019 6.3 (H)    Inflammatory Markers  CRP Inflammation (mg/L)   Date Value   01/07/2022 5.6   09/26/2021 116.0 (H)   09/10/2021 140.0 (H)   05/29/2019 6.7   07/15/2010 51.7 (H)     WBC (10e9/L)   Date Value   06/01/2021 12.7 (H)   05/04/2021 15.8 (H)   04/05/2021 16.3 (H)     WBC Count (10e3/uL)   Date Value   09/18/2023 9.9   09/16/2023 7.9   09/15/2023 9.4     Albumin (g/dL)   Date Value   09/18/2023 2.4 (L)   09/16/2023 2.6 (L)   09/13/2023 2.9 (L)   08/02/2022 3.8   06/06/2022 3.6   03/07/2022 3.5   06/01/2021 3.6   05/04/2021 3.6   04/05/2021 3.8      Magnesium (mg/dL)   Date Value   09/18/2023 1.7   09/17/2023 1.8   09/16/2023 1.8     Sodium (mmol/L)   Date Value   09/18/2023 130 (L)   09/17/2023 137   09/16/2023 134 (L)   06/01/2021 136   05/04/2021 137   04/05/2021 140    Renal  Urea Nitrogen (mg/dL)   Date Value   09/18/2023 20.2    09/17/2023 17.6   09/16/2023 14.7   08/02/2022 20   06/29/2022 24   06/06/2022 24   06/01/2021 26   05/04/2021 27   04/05/2021 24     Creatinine (mg/dL)   Date Value   09/18/2023 1.11   09/17/2023 1.17   09/16/2023 1.22 (H)   06/01/2021 1.51 (H)   05/04/2021 1.57 (H)   04/05/2021 1.48 (H)     Additional  Triglycerides (mg/dL)   Date Value   07/26/2023 115   02/15/2022 147   04/05/2021 64   01/15/2021 157 (H)   03/11/2019 97     Ketones Urine (mg/dL)   Date Value   09/01/2023 Negative   12/11/2017 Trace (A)        K low at 3.0 - replacements in place, Phos low at 2.4 - replacements in place, Na low at 130     Medications:   heparin ANTICOAGULANT  5,000 Units Subcutaneous Q8H    insulin aspart  1-12 Units Subcutaneous Q4H    insulin glargine  20 Units Subcutaneous QAM AC    lidocaine  2 patch Transdermal Q24H    lipids plant base  250 mL Intravenous Q24H    metoprolol tartrate  25 mg Oral BID    pantoprazole  40 mg Oral Daily    potassium & sodium phosphates  1 packet Oral or Feeding Tube Q4H    potassium chloride  20 mEq Oral Once    predniSONE  10 mg Oral Daily    [Held by provider] rosuvastatin  20 mg Oral Daily    sodium chloride (PF)  10-40 mL Intracatheter Q7 Days    sodium chloride (PF)  3 mL Intracatheter Q8H    thiamine  100 mg Oral Daily       dextrose      parenteral nutrition - ADULT compounded formula 60 mL/hr at 09/17/23 2053    sodium chloride 50 mL/hr at 09/16/23 2201      acetaminophen, albuterol, benzocaine 20%, sore throat, dextrose, glucose **OR** dextrose **OR** glucagon, hydrALAZINE, lidocaine 4%, lidocaine (buffered or not buffered), lidocaine (buffered or not buffered), metoprolol, morphine, naloxone **OR** naloxone **OR** naloxone **OR** naloxone, ondansetron **OR** ondansetron, oxyCODONE IR **OR** oxyCODONE, prochlorperazine, sodium chloride (PF), sodium chloride (PF), sodium chloride (PF), sodium chloride (PF), sodium chloride 0.9%     Recommendations:  - Patient advanced to a full liquid  diet, will plan to offer oral nutritional supplements    - Discontinue lipids with diet advancement   - Plan to continue to advance TPN to goal, outlined below --  Compounded TPN w/ 215D/102AA @ 60ml/hr + no lipids   Intake: TPN providing 1139 kcals, 215 g CHO, 102 g AA, 0 g fat, 1440ml fluid daily.   - Replace electrolytes per protocol - phos low at 2.4 and potassium low at 3.0     Gloria Stevens RD, LD  Clinical Dietitian     Pager - 3rd floor/ICU: [phone]  Pager - All other floors: [phone]  Pager - Weekend/holiday: [phone]  Office phone: [phone]

## 2023-09-18 NOTE — PHARMACY-CONSULT NOTE
TPN changes today    RD increased dextrose to 215 grams - will increase insulin in tpn to 20 units    Increased electrolytes   Potassium to  70 mEq  Phos to 30 mMol  Magnesium to 9 mEq    CL:AC ratio changed to 1:3

## 2023-09-19 NOTE — PROGRESS NOTES
"CLINICAL NUTRITION SERVICES - BRIEF NOTE     Nutrition Prescription    RECOMMENDATIONS FOR MDs/PROVIDERS TO ORDER:  BG > 180 x 2 in 24 hours, consider adjusting insulin    Consider discontinuing IV fluids to avoid fluid overload as pt is still on TPN.     Malnutrition Status:    Moderate in the context of acute on chronic illness    Recommendations already ordered by Registered Dietitian (RD):  Adjusted TPN to Clinimix E @ 50ml/hr dt improved PO intake    TPN will provide 852kcals, 180g CHO, 60g protein, 1200ml fluid daily    Ordered Glucerna BID b/n meals to help pt meet needs orally    Future/Additional Recommendations:  Consider discontinuing TPN tomorrow pending PO intake and supplement tolerance       EVALUATION OF THE PROGRESS TOWARD GOALS   Diet: NPO  Nutrition Support: Compounded TPN w/ 215D/102AA @ 60ml/hr w/ no lipids  Intake: TPN providing 1139kcals, 215g CHO, 102g AA, 0g fat, 1440ml fluid daily.     Sodium Chloride infusion @ 50ml/hr providing 1200ml fluid daily bringing totals to 2640ml fluid daily.    Pt eating 75% per nursing records and received 1007kcals and 39g protein for 3 meals yesterday.      NEW FINDINGS   Per CRS note, continue TPN until adequate PO intake. Advanced to low fiber diet today.    9/17: Tolerating clear liquids  9/18: Tolerating full liquids    09/19/23 0445 86.1 kg (189 lb 14.4 oz)     09/16/23 0608 86.6 kg (190 lb 14.7 oz)   09/10/23 0542 86.6 kg (190 lb 14.7 oz)   09/08/23 1023 85.1 kg (187 lb 11.2 oz)     Net fluid up 4.3L (9.5lbs) since admit per I/Os    ANTHROPOMETRICS  Height: 175.3 cm (5' 9.016\")  Most Recent Weight: 86.6 kg (190 lb 14.7 oz)    IBW: 70.7 kg  BMI: Overweight BMI 25-29.9  Weight History:   Wt Readings from Last 30 Encounters:   09/19/23 86.1 kg (189 lb 14.4 oz)   09/01/23 87.8 kg (193 lb 9.6 oz)   08/31/23 88 kg (194 lb 1.6 oz)   08/23/23 87 kg (191 lb 11.2 oz)   08/17/23 87.1 kg (192 lb)   08/03/23 88 kg (194 lb 1.6 oz)   07/30/23 91.1 kg (200 lb 13.4 oz) "   07/26/23 90.8 kg (200 lb 3.2 oz)   07/07/23 91.6 kg (202 lb)   05/30/23 92.8 kg (204 lb 8 oz)   05/17/23 93 kg (205 lb 1.6 oz)   03/22/23 94.3 kg (208 lb)   02/16/23 94.3 kg (208 lb)   11/28/22 96.6 kg (213 lb)   10/14/22 94.8 kg (208 lb 14.4 oz)   09/23/22 96 kg (211 lb 9.6 oz)   08/02/22 95.6 kg (210 lb 11.2 oz)   07/12/22 96.2 kg (212 lb)   07/08/22 96.3 kg (212 lb 6.4 oz)   06/22/22 95.3 kg (210 lb)   06/15/22 95.8 kg (211 lb 4.8 oz)   06/09/22 95.7 kg (211 lb)   03/15/22 98.3 kg (216 lb 11.2 oz)   03/10/22 97.3 kg (214 lb 8 oz)   02/15/22 97.1 kg (214 lb)   12/07/21 94.8 kg (209 lb)   11/24/21 95.4 kg (210 lb 6.4 oz)   10/06/21 93 kg (205 lb)   09/20/21 94.6 kg (208 lb 9.6 oz)   09/17/21 94.3 kg (208 lb)     ASSESSED NUTRITION NEEDS PER APPROVED PRACTICE GUIDELINES:     Dosing Weight 86.6 kg   Estimated Energy Needs: 1880 kcals ([place] [address] Jeor)  Justification: maintenance, activity factor 1.2  Estimated Protein Needs:  grams protein - 1-1.2 g pro/Kg  Justification: preservation of lean body mass, CKD  Estimated Fluid Needs: 1800ml+ (1ml/kcal) or per MD    PHYSICAL FINDINGS  See malnutrition section below.  Dry skin, abdominal bruising, surgical incisions abdomen, scattered abrasions/excoriation     GI CONCERNS  Abdominal Pain, Bloating, and LBM 9/19 1 so far today    9/16: 12 stools  9/17: 6 stools  9/18: 8 stools    LABS  Reviewed: -294, Na 129, Phos 2.4, hgb 8.0, hematocrit 25.7, platelet count 65, rbc 3.01, MCHC 31.1, RDW 18.2    MEDICATIONS  Reviewed: oscal, Novolog, lantus pen, protonix, neutra-phos, deltasone, thiamine

## 2023-09-19 NOTE — PHARMACY-CONSULT NOTE
Tpn changes     RD changed  AA to 60 and carb to 180    Electrolytes changed -  Sodium to 60 mEq  Phos to 35mMol  Mag to 10    CL:AC changed to 1:4

## 2023-09-19 NOTE — PLAN OF CARE
Goal Outcome Evaluation:       End of Shift Summary  For vital signs and complete assessments, please see documentation flowsheets.     Pertinent assessments: Pt A&Ox4, VSS on RA, up with SBA with cane. Complained of abdominal pain, PRN oxycodone given. PICC line infusing TPN and NS. Bmx 4. Pt on k+mg+phos protocol.    Major Shift Events: none    Treatment Plan:  TPN. IVF. Electrolyte management.

## 2023-09-19 NOTE — TELEPHONE ENCOUNTER
Patient is currently hospitalized.   My Chart message sent that staff at [facility] have recommended he start using the Dex Com G7 when he is discharged, so he doesn't have to stick his fingers frequently.  Order pended, please sign if in agreement.  Thank you.  QUIRINO Acevedo RN

## 2023-09-19 NOTE — TELEPHONE ENCOUNTER
Prescription signed.    Luma Kauffman MD  Internal Medicine & Pediatrics  [facility]  She/her

## 2023-09-19 NOTE — PLAN OF CARE
Problem: Malnutrition  Goal: Improved Nutritional Intake  Outcome: Progressing   Goal Outcome Evaluation:    BG > 180 x 2 in 24 hours, consider adjusting insulin     Consider discontinuing IV fluids to avoid fluid overload as pt is still on TPN.      Adjusted TPN to Clinimix E @ 50ml/hr dt improved PO intake     TPN will provide 852kcals, 180g CHO, 60g protein, 1200ml fluid daily     Ordered Glucerna BID b/n meals to help pt meet needs orally

## 2023-09-19 NOTE — PROGRESS NOTES
[facility]    Hospitalist Progress Note  Name: Austin Garza    MRN: [id number]  Provider:  Audie Hooker DO, MPH  Date of Service: 09/19/2023    Summary of Stay: Austin Garza is a 83 year old male patient with past medical history of hypertension, GERD, CLL, diabetes mellitus type 2, transverse colon cancer, who was admitted for obstructing elective surgery. Patient underwent elective diagnostic laparoscopy and open extended right colectomy with ileocolic anastomosis for curative intent.       Colon cancer.  Status post laparoscopic, open extended right colectomy with ileocolic anastomosis.  Postoperative ileus.  -Initial surgery 9/8.  -Colorectal surgery following.  -Diet per colorectal surgery.  -Minimize opiates as able.  -NG tube taken out 9/17.  -Advance to low fiber today  -Wean TPN today and likely discontinue.  Started on 9/15.  -Change pantoprazole to oral 40 mg a day.     Chronic steroid use.  Possible adrenal insufficiency.  -Had been on IV hydrocortisone postoperatively while NPO.  -Restart prednisone 10 mg a day on 9/18.     Acute postoperative anemia on chronic anemia.  -Hemoglobin down to 6.9 on 9/14.  -Transfused 1 unit packed red blood cells.  -Hemoglobin stable at 8.  -Recheck CBC intermittently.     Diabetes mellitus type 2.  -Continue glargine insulin 20 units a day.  -Aspart insulin sliding scale as needed.  -Also receiving 20 unit(s) insulin daily with TPN so Lantus dose may need to be increased tomorrow.     Hypertension.  -Continue increased metoprolol to 50 mg twice a day.       Chronic kidney disease.  -Creatinine stable at 1.1.  -Avoid nephrotoxins as able.  -Recheck metabolic panel intermittently.     Hyponatremia.  Hypokalemia.  Hypomagnesemia.  Hypocalcemia.  -potassium replacement protocol.  -Magnesium replacement protocol.  -Phosphorus replacement protocol.  -ionized calcium low.  -Had calcium gluconate 1 g IV on 9/15 and 9/16.  -Now start calcium carbonate  500 mg twice a day.  -Recheck metabolic panel intermittently.     Chronic thrombocytopenia.  -Platelets appear to be near baseline.  -Recheck CBC intermittently.     Hyperlipidemia.  -Hold rosuvastatin.    DVT Prophylaxis: Heparin SQ  Code Status: Full Code  Diet: Diet  parenteral nutrition - ADULT compounded formula  Low Fiber Diet  Snacks/Supplements Adult: Glucerna; Between Meals    Siddiqui Catheter: Not present  Disposition: Expected discharge in 1 days to home. Goals prior to discharge include wean TPN and monitor dietary tolerance.   Incidental Findings: As above.  Family updated today: No    40 MINUTES SPENT BY ME on the date of service doing chart review, history, exam, documentation & further activities per the note.      Interval History   Assumed care from previous hospitalist. The history was fully reviewed.  The patient reports doing well. No chest pain or shortness of breath. No nausea, vomiting, diarrhea, constipation. No fevers. No other specific complaints identified.     -Data reviewed today: I personally reviewed all new labs and imaging results over the last 24 hours.     Physical Exam   Temp: 98.5  F (36.9  C) Temp src: Oral BP: 130/75 Pulse: 82   Resp: 18 SpO2: 98 % O2 Device: None (Room air)    Vitals:    09/10/23 0542 09/16/23 0608 09/19/23 0445   Weight: 86.6 kg (190 lb 14.7 oz) 86.6 kg (190 lb 14.7 oz) 86.1 kg (189 lb 14.4 oz)     Vital Signs with Ranges  Temp:  [97.2  F (36.2  C)-98.5  F (36.9  C)] 98.5  F (36.9  C)  Pulse:  [77-82] 82  Resp:  [18-20] 18  BP: (130-142)/(70-75) 130/75  SpO2:  [97 %-98 %] 98 %  I/O last 3 completed shifts:  In: 837 [P.O.:837]  Out: 2300 [Urine:2300]    GENERAL: No apparent distress. Awake, alert, and fully oriented.  HEENT: Normocephalic, atraumatic. Extraocular movements intact.  CARDIOVASCULAR: Regular rate and rhythm without murmurs or rubs. No S3.  PULMONARY: Clear bilaterally.  GASTROINTESTINAL: Soft, non-tender, non-distended. Bowel sounds normoactive.    EXTREMITIES: No cyanosis or clubbing. No edema.  NEUROLOGICAL: CN 2-12 grossly intact, no focal neurological deficits.  DERMATOLOGICAL: No rash, ulcer, bruising, nor jaundice.     Medications    dextrose      parenteral nutrition - ADULT compounded formula 60 mL/hr at 09/18/23 2038    sodium chloride 50 mL/hr at 09/19/23 0650      calcium carbonate  500 mg Oral BID w/meals    heparin ANTICOAGULANT  5,000 Units Subcutaneous Q8H    insulin aspart  1-12 Units Subcutaneous Q4H    insulin glargine  20 Units Subcutaneous QAM AC    lidocaine  2 patch Transdermal Q24H    metoprolol tartrate  50 mg Oral BID    pantoprazole  40 mg Oral Daily    potassium & sodium phosphates  1 packet Oral or Feeding Tube Q4H    predniSONE  10 mg Oral Daily    [Held by provider] rosuvastatin  20 mg Oral Daily    sodium chloride (PF)  10-40 mL Intracatheter Q7 Days    sodium chloride (PF)  3 mL Intracatheter Q8H    thiamine  100 mg Oral Daily     Data     Laboratory:  Recent Labs   Lab 09/18/23  0617 09/16/23  0547 09/15/23  0632   WBC 9.9 7.9 9.4   HGB 8.0* 8.2* 8.5*   HCT 25.7* 26.9* 27.7*   MCV 85 88 86   PLT 65* 82* 70*     Recent Labs   Lab 09/19/23  0632 09/19/23  0439 09/19/23  0003 09/18/23 2009 09/18/23  1704 09/18/23  0817 09/18/23  0617 09/17/23  0803 09/17/23  0555   *  --   --   --   --   --  130*  --  137   POTASSIUM 3.9  --   --   --  4.4  --  3.0*  --  3.5   CHLORIDE 100  --   --   --   --   --  103  --  105   CO2 20*  --   --   --   --   --  21*  --  22   ANIONGAP 9  --   --   --   --   --  6*  --  10   * 209* 235*   < >  --    < > 213*   < > 182*   BUN 20.1  --   --   --   --   --  20.2  --  17.6   CR 0.95  --   --   --   --   --  1.11  --  1.17   GFRESTIMATED 80  --   --   --   --   --  67  --  63   MAGDALENO 8.0*  --   --   --   --   --  7.6*  --  8.1*    < > = values in this interval not displayed.     Recent Labs   Lab 09/19/23  0632 09/19/23  0439 09/19/23  0003 09/18/23 2009 09/18/23  1602   * 209* 235*  234* 224*     No results for input(s): CULT in the last 168 hours.    Imaging:  No results found for this or any previous visit (from the past 24 hour(s)).      Audie Hooker DO MPH  [facility] Hospitalist  [address] [place] [address]  09/19/2023

## 2023-09-19 NOTE — PROGRESS NOTES
COLON & RECTAL SURGERY  PROGRESS NOTE    September 19, 2023  Post-op Day # 11    SUBJECTIVE:  Patient was in the restroom during the visit. He feels well. Tolerating fulls without n/v. Had one oxycodone yesterday, otherwise pain has been controlled. +BM/gas.    OBJECTIVE:  Temp:  [97.2  F (36.2  C)-98.5  F (36.9  C)] 98.5  F (36.9  C)  Pulse:  [77-82] 82  Resp:  [18-20] 18  BP: (130-142)/(70-75) 130/75  SpO2:  [97 %-98 %] 98 %    Intake/Output Summary (Last 24 hours) at 9/19/2023 1033  Last data filed at 9/19/2023 0848  Gross per 24 hour   Intake 837 ml   Output 3100 ml   Net -2263 ml       GENERAL:  Awake, alert, no acute distress, in the bathroom       LABS:  Lab Results   Component Value Date    WBC 9.9 09/18/2023    WBC 12.7 06/01/2021     Lab Results   Component Value Date    HGB 8.0 09/18/2023    HGB 13.5 06/01/2021     Lab Results   Component Value Date    HCT 25.7 09/18/2023    HCT 41.3 06/01/2021     Lab Results   Component Value Date    PLT 65 09/18/2023     06/01/2021     Last Basic Metabolic Panel:  Lab Results   Component Value Date     09/19/2023     06/01/2021      Lab Results   Component Value Date    POTASSIUM 3.9 09/19/2023    POTASSIUM 3.9 08/02/2022    POTASSIUM 4.2 06/01/2021     Lab Results   Component Value Date    CHLORIDE 100 09/19/2023    CHLORIDE 103 08/02/2022    CHLORIDE 104 06/01/2021     Lab Results   Component Value Date    MAGDALENO 8.0 09/19/2023    MAGDALENO 8.9 06/01/2021     Lab Results   Component Value Date    CO2 20 09/19/2023    CO2 24 08/02/2022    CO2 30 06/01/2021     Lab Results   Component Value Date    BUN 20.1 09/19/2023    BUN 20 08/02/2022    BUN 26 06/01/2021     Lab Results   Component Value Date    CR 0.95 09/19/2023    CR 1.51 06/01/2021     Lab Results   Component Value Date     09/19/2023     09/19/2023     08/02/2022     06/01/2021       ASSESSMENT/PLAN: Austin ANETTE Garza is a 83 year old male POD # 11 s/p laparoscopic  converted to open extended right colectomy for management of a T3 N0 colon cancer. His postop course has been notable for prolonged ileus requiring NGT, with PICC placed and TPN. CT scan on 9/14 with some dilated small bowel consistent with an ileus. Resolved ileus.     - Low fiber diet  - Wean TPN  - Pain management as needed  - Encourage ambulation.  - SQH for ppx, will need DVT ppx at discharge  - Possible discharge later today if tolerates LFD and weaned off TPN.      For questions/paging, please contact the CRS office at [phone].    Beth Tovar PA-C  [facility]  Phone: [phone]    [facility]  I performed a history and physical examination of the patient and discussed their management with the physician assistant. I reviewed the physician assistants note and agree with the documented findings and plan of care.     Kristy Patterson MD, FACS, FASCRS    [facility]  [address] [address]  T: [phone]  F: [phone]

## 2023-09-19 NOTE — PLAN OF CARE
Goal Outcome Evaluation:    End of Shift Summary  For vital signs and complete assessments, please see documentation flowsheets.     Pertinent assessments: Patient A&Ox4. VSS on RA. Up and ambulated x2 out the kemp with SBA with cane.  No nausea & vomiting. Tolerated Low fiber diet. On K,Mg,Phos protocol, Phos replaced.     Major Shift Events : none    Treatment Plan: wean TPN, advance diet per CRS, electrolyte management    Bedside Nurse: Adina Branham RN

## 2023-09-20 NOTE — PROGRESS NOTES
"CLINICAL NUTRITION SERVICES - BRIEF NOTE    - Refer to previous RD notes, chart check for nutrition support patient.  - CRS note from yesterday outlines \"wean TPN\" though still running this AM?  - Discussed w/ RN who is running half rate currently.   - Also alerted PharmD in light of current Lantus/possible hypoglycemia risk.  Deferring ongoing glycemic management and insulin regimen to MD and Pharmacy teams.  In discussion w/ Pharmacy this AM their recommendation would be to continue Central Clinimix at 25 mL/hr until at least noon to monitor BG trending in light of recent changes to Lantus.  - Patient on low fiber diet and already has supplements ordered.  - Will continue following.      Maria Isabel Bryan RDN, LD  Clinical Dietitian  3rd floor/ICU: [phone]  All other floors: [phone]  Weekend/holiday: [phone]  Office: [phone]      "

## 2023-09-20 NOTE — PLAN OF CARE
Goal Outcome Evaluation:       End of Shift Summary  For vital signs and complete assessments, please see documentation flowsheets.     Pertinent assessments: Patient A&Ox4. VSS on RA. SBA with cane. No nausea & vomiting. Low fiber diet. PICC line infusing TPN and NS On K,Mg,Phos protocol.    Treatment Plan: TPN, electrolyte management.

## 2023-09-20 NOTE — DISCHARGE SUMMARY
Hospitalist Discharge Summary  [facility]    Austin Garza MRN# [id number]   YOB: 1940 Age: 83 year old     Date of Admission:  9/8/2023  Date of Discharge:  9/20/2023  Admitting Physician:  Vanessa Frankel MD  Discharge Physician:  Audie Hooker DO  Discharging Service:  Hospitalist     Primary Provider: Luma Kauffman          Discharge Diagnosis:     Colon cancer.  Status post laparoscopic, open extended right colectomy with ileocolic anastomosis.  Postoperative ileus.  Chronic steroid use.  Possible adrenal insufficiency.  Diabetes mellitus type 2.   Hypertension.  Chronic kidney disease.  Hyponatremia.  Hypokalemia.  Hypomagnesemia.  Hypocalcemia.  Chronic thrombocytopenia.  Hyperlipidemia.  Acute postoperative anemia on chronic anemia.              Discharge Disposition:     Discharged to home           Allergies:     Allergies   Allergen Reactions    Seasonal Allergies               Discharge Medications:     Current Discharge Medication List        START taking these medications    Details   oxyCODONE (ROXICODONE) 5 MG tablet Take 1 tablet (5 mg) by mouth every 6 hours as needed for moderate pain or moderate to severe pain  Qty: 5 tablet, Refills: 0    Associated Diagnoses: Cancer of transverse colon (H); S/P right hemicolectomy      thiamine (B-1) 100 MG tablet Take 1 tablet (100 mg) by mouth daily  Qty: 30 tablet, Refills: 0    Associated Diagnoses: Cancer of transverse colon (H)           CONTINUE these medications which have NOT CHANGED    Details   Dulaglutide 4.5 MG/0.5ML SOPN Inject 4.5 mg Subcutaneous once a week  Qty: 6 mL, Refills: 4    Associated Diagnoses: Type 2 diabetes mellitus with other specified complication, with long-term current use of insulin (H)      HUMALOG KWIKPEN 100 UNIT/ML soln       insulin aspart (NOVOLOG PEN) 100 UNIT/ML pen Inject 1-7 Units Subcutaneous 3 times daily (before meals) Do Not give Correction Insulin if  "Pre-Meal BG less than 140. For Pre-Meal  - 189 give 1 unit. For Pre-Meal  - 239 give 2 units. For Pre-Meal  - 289 give 3 units. For Pre-Meal  - 339 give 4 units. For Pre-Meal - 399 give 5 units. For Pre-Meal -449 give 6 units For Pre-Meal BG greater than or equal to 450 give 7 units.  Qty: 15 mL, Refills: 1    Associated Diagnoses: Type 2 diabetes mellitus with other specified complication, with long-term current use of insulin (H)      insulin glargine (LANTUS PEN) 100 UNIT/ML pen Inject 10 Units Subcutaneous every morning (before breakfast)  Qty: 15 mL, Refills: 0    Comments: If Lantus is not covered by insurance, may substitute Basaglar or Semglee or other insulin glargine product per insurance preference at same dose and frequency.    Associated Diagnoses: Type 2 diabetes mellitus with other specified complication, with long-term current use of insulin (H)      losartan (COZAAR) 25 MG tablet Take 1 tablet (25 mg) by mouth daily  Qty: 90 tablet, Refills: 3    Associated Diagnoses: Atherosclerosis of native coronary artery of native heart without angina pectoris; Cardiomyopathy, unspecified type (H); Hypertension goal BP (blood pressure) < 140/90      metoprolol succinate ER (TOPROL XL) 100 MG 24 hr tablet Take 1 tablet (100 mg) by mouth daily  Qty: 90 tablet, Refills: 4    Associated Diagnoses: Cardiomyopathy, unspecified type (H); Abdominal aortic aneurysm (AAA) without rupture, unspecified part (H)      Microlet Lancets MISC USE TO TEST BLOOD SUGAR 3 TIMES DAILY OR AS DIRECTED.      nitroGLYcerin (NITROSTAT) 0.4 MG sublingual tablet One tablet under the tongue every 5 minutes if needed for chest pain. May repeat every 5 minutes for a maximum of 3 doses in 15 minutes\"  Qty: 25 tablet, Refills: 3    Associated Diagnoses: Coronary artery disease involving native coronary artery of native heart without angina pectoris      pantoprazole (PROTONIX) 40 MG EC tablet Take 1 tablet " (40 mg) by mouth daily  Qty: 90 tablet, Refills: 4    Associated Diagnoses: Gastroesophageal reflux disease, unspecified whether esophagitis present      predniSONE (DELTASONE) 10 MG tablet Take 1 tablet (10 mg) by mouth daily  Qty: 10 tablet, Refills: 0    Associated Diagnoses: Polyarthritis      rosuvastatin (CRESTOR) 20 MG tablet Take 1 tablet (20 mg) by mouth daily  Qty: 90 tablet, Refills: 4    Associated Diagnoses: Atrial fibrillation, unspecified type (H)      Alcohol Swabs (ALCOHOL PREP) 70 % PADS       aspirin 81 MG EC tablet Take 1 tablet (81 mg) by mouth daily  Qty: 30 tablet, Refills: 1    Associated Diagnoses: Atherosclerosis of native coronary artery of native heart without angina pectoris      blood glucose (NO BRAND SPECIFIED) lancets standard Use to test blood sugar 3 times daily or as directed.  Qty: 100 each, Refills: 3    Associated Diagnoses: Type 2 diabetes mellitus without complication, with long-term current use of insulin (H)      blood glucose (NO BRAND SPECIFIED) test strip Contour Next Test Strips-Use to test blood sugar 3 times daily or as directed.  Qty: 300 strip, Refills: 1    Associated Diagnoses: Type 2 diabetes mellitus without complication, with long-term current use of insulin (H)      Continuous Blood Gluc Sensor (DEXCOM G7 SENSOR) MISC 1 each every 10 days Change sensor every 10 days  Qty: 3 each, Refills: 5    Associated Diagnoses: Type 2 diabetes mellitus with other specified complication, with long-term current use of insulin (H)           STOP taking these medications       Continuous Blood Gluc  (DEXCOM G7 ) SEAN Comments:   Reason for Stopping:         metroNIDAZOLE (FLAGYL) 500 MG tablet Comments:   Reason for Stopping:         nitroFURantoin macrocrystal-monohydrate (MACROBID) 100 MG capsule Comments:   Reason for Stopping:         ondansetron (ZOFRAN ODT) 4 MG ODT tab Comments:   Reason for Stopping:                      Condition on Discharge:  "    Discharge condition: Stable   Discharge vitals: Blood pressure (!) 148/76, pulse 83, temperature 98.3  F (36.8  C), resp. rate 17, height 1.753 m (5' 9.02\"), weight 83.9 kg (184 lb 15.5 oz), SpO2 98 %.   Code status on discharge: Full Code      BASIC PHYSICAL EXAMINATION:  GENERAL: No apparent distress.  CARDIOVASCULAR: Regular rate and rhythm without murmurs.  PULMONARY: Clear to auscultation bilaterally.   GASTROINTESTINAL: Abdomen soft, non-tender.  EXTREMITIES: No edema, pulses intact.  NEUROLOGIC: No focal deficits.            History of Illness:   See detailed admission note for full details.               Procedures excluding imaging which is summarized below:     Please see details in the electronic medical record.           Consultations:     HOSPITALIST IP CONSULT  CARE MANAGEMENT / SOCIAL WORK IP CONSULT  VASCULAR ACCESS ADULT IP CONSULT  PHARMACY/NUTRITION TO START AND MANAGE TPN  PHARMACY IP CONSULT  PHARMACY IP CONSULT  PHARMACY IP CONSULT  CARE MANAGEMENT / SOCIAL WORK IP CONSULT  PHARMACY IP CONSULT  PHARMACY IP CONSULT  PHYSICAL THERAPY ADULT IP CONSULT  PHARMACY IP CONSULT  PHARMACY IP CONSULT          Significant Results:     Results for orders placed or performed during the hospital encounter of 09/08/23   XR Abdomen Port 1 View    Narrative    EXAM: XR ABDOMEN PORT 1 VIEW  LOCATION: [facility]  DATE: 9/12/2023    INDICATION: POD 4 colectomy.  no multiple episodes of emesis.  eval for obstruction  COMPARISON: CT abdomen pelvis 09/26/2021      Impression    IMPRESSION: Dilated loops of small bowel bowel throughout the abdomen, which could indicate diffuse ileus, or small bowel obstruction. Supine technique limits assessment for free air; none is identified. Aortobiiliac graft and embolic material.   XR Abdomen 1 View    Narrative    ABDOMEN ONE VIEW  9/12/2023 2:24 PM     HISTORY: NGT placement.    COMPARISON: September 12, 2023       Impression    IMPRESSION: NG tube " tip in the left upper quadrant projecting within  the stomach bubble. Dilated small bowel similar to earlier today.    FAISAL DEMPSEY MD         SYSTEM ID:  [id number]   CT Abdomen Pelvis w Contrast    Narrative    EXAM: CT ABDOMEN PELVIS W CONTRAST  LOCATION: [facility]  DATE: 9/14/2023    INDICATION: eval for ileus 0 83 year old?male?patient with past medical history of hypertension, GERD, CLL, diabetes mellitus type 2, transverse colon cancer, who was admitted for obstructing elective surgery. Patient underwent elective diagnostic   laparoscopy and open extended right colectomy with ileocolic anastomosis for curative intent.?     COMPARISON: 08/16/2023  TECHNIQUE: CT scan of the abdomen and pelvis was performed following injection of IV contrast. Multiplanar reformats were obtained. Dose reduction techniques were used.  CONTRAST: 95 mL Isovue 370    FINDINGS:   LOWER CHEST: Severe coronary arterial calcification, question prior stent placement within the LAD and circumflex coronary arteries. Mild aortic valve calcification correlate for possible valvular stenosis. Trace left pleural effusion. Mild subsegmental   atelectasis dependently in the lower lobes.    HEPATOBILIARY: Normal.    PANCREAS: Normal.    SPLEEN: Normal.    ADRENAL GLANDS: Normal.    KIDNEYS/BLADDER: Normal.    BOWEL: Diffuse dilation of fluid-filled small bowel loops. There is a small amount of free fluid. Mild pneumoperitoneum compatible with recent surgery. The enterocolic anastomosis within the left upper quadrant is unremarkable status post right   hemicolectomy. Severe distal colonic diverticulosis but no evidence for diverticulitis. Colon is collapsed.    LYMPH NODES: Normal.    VASCULATURE: Prior endovascular repair of abdominal aortic aneurysm. There are multiple collaterals within the excluded aneurysm sac compatible with treatment for prior endoleak. There is resulting severe artifact obscuring most of the abdominal  aorta   and adjacent retroperitoneal structures.    PELVIC ORGANS: Small fat-containing inguinal hernias are seen bilaterally. Prostate gland unremarkable.    MUSCULOSKELETAL: No suspicious bone lesion.      Impression    IMPRESSION:   1.  Interval right hemicolectomy with expected pneumoperitoneum.  2.  Dilated fluid-filled small bowel loops compatible with ileus.  3.  Severe distal colonic diverticulosis.  4.  Trace left effusion and mild bibasilar atelectasis.  5.  Prior abdominal aortic aneurysm repair.   XR Gastrografin Challenge    Narrative    EXAM: XR GASTROGRAFIN CHALLENGE  LOCATION: [facility]  DATE: 9/17/2023    INDICATION: Small Bowel Obstruction  COMPARISON: CT 09/14/2023.      Impression    IMPRESSION: 8 hours post contrast. There is contrast material within multiple dilated small bowel loops as well as nondilated colon to the sigmoid colon. NG tube in the stomach.     *Note: Due to a large number of results and/or encounters for the requested time period, some results have not been displayed. A complete set of results can be found in Results Review.       Transthoracic Echocardiogram Results:  No results found for this or any previous visit (from the past 4320 hour(s)).             Pending Results:     Unresulted Labs Ordered in the Past 30 Days of this Admission       No orders found from 8/9/2023 to 9/9/2023.                        Discharge Instructions and Follow-Up:     Discharge instructions and follow-up:   Discharge Procedure Orders   Medication Therapy Management Referral   Referral Priority: Routine Referral Type: Med Therapy Management   Requested Specialty: Pharmacist   Number of Visits Requested: 1     Reason for your hospital stay   Order Comments: You were in the hospital for surgery     Follow-up and recommended labs and tests    Order Comments: Follow up in the [place] office on 10/5/23 with Dr. Frankel at 1:40PM, please arrive 15 minutes early for paperwork.   Call [phone] to reschedule your appointment as needed. Call the office at [phone] if you develop fever, uncontrolled pain, bleeding, nausea, vomiting, or constipation.    [place] Office:   [id number]  [place] [address]     Activity   Order Comments: No heavy lifting >10-15lbs for 6 weeks.     Do not drive while taking narcotic pain medication.    Get regular activity and try to walk for a total of 30 minutes. Start slow by walking 5-10 minutes at a time, increasing each day to 30 minutes at one time. Slowly return to your regular level of activity. Rest as needed.     Order Specific Question Answer Comments   Is discharge order? Yes      When to contact your care team   Order Comments: Please call the clinic if:  - fever greater than 101 degrees  - any signs of infection (increasing redness, swelling, tenderness, warmth, change in appearance, increased drainage)  - blood in urine or stool  - severe pain that is not relieved by medicine, rest, or ice    Or as questions or concerns arise. Contact clinic at [phone]    Call 561 if you feel you are having a medical emergency     Wound care and dressings   Order Comments: Instructions to care for your wound at home: ice to area for comfort and may get incision wet in shower but do not soak or scrub.     Follow-up and recommended labs and tests    Order Comments: Follow up with primary care provider, Luma Kauffman, within 7 days to evaluate medication change and for hospital follow- up.  The following labs/tests are recommended: BMP, CBC, Mg, Phos, HbA1c.  Follow-up with Oncology and Colorectal Surgery.     Activity   Order Comments: Your activity upon discharge: activity as tolerated and no driving while on analgesics     Order Specific Question Answer Comments   Is discharge order? Yes      Full Code     Order Specific Question Answer Comments   Code status determined by: Discussion with patient/ legal decision maker      Diet    Order Comments: LOW FIBER DIET  A low fiber diet helps to decrease size and frequency of stools    Avoid nuts, seeds, raw vegetables.     Try foods such as:  - beef, poultry, fish, eggs, smooth peanut butter, dairy (as tolerated)  - refined or white flour products (like white bread, white pasta, etc)     Order Specific Question Answer Comments   Is discharge order? Yes              Hospital Course:     Austin Garza is a 83 year old male patient with past medical history of hypertension, GERD, CLL, diabetes mellitus type 2, transverse colon cancer, who was admitted for obstructing elective surgery. Patient underwent elective diagnostic laparoscopy and open extended right colectomy with ileocolic anastomosis for curative intent.  He had a lengthy postoperative course.  His oral intake is gradually improved and was bridged with TPN which was weaned off and discontinued today.  He required electrolyte replacement.  He was on IV steroids given his chronic steroid use but has now been transition back to his prednisone.  Pain is controlled.  Diet is being tolerated.  Cleared for discharge home by colorectal surgery today and will follow-up as an outpatient with their service as well as oncology.     Colon cancer.  Status post laparoscopic, open extended right colectomy with ileocolic anastomosis.  Postoperative ileus.  -Initial surgery 9/8.  -Colorectal surgery following.  -Diet per colorectal surgery.  -Minimize opiates as able.  -NG tube taken out 9/17.  -Advance to low fiber and appears to be tolerating  -Wean TPN today and discontinue.  Started on 9/15.  -Continue pantoprazole to oral 40 mg a day.     Chronic steroid use.  Possible adrenal insufficiency, resolved.  -Had been on IV hydrocortisone postoperatively while NPO.  -Restart prednisone 10 mg a day on 9/18.  -No evidence of ongoing adrenal insufficiency and hemodynamically stable.     Acute postoperative anemia on chronic anemia.  -Hemoglobin down to 6.9 on  9/14.  -Transfused 1 unit packed red blood cells.  -Hemoglobin stable at 8.  -Recheck CBC intermittently.     Diabetes mellitus type 2.  -Still hyperglycemic so we will receive 25 units of glargine today (while weaning TPN off) and then resume to his prior to admission dosing of 10 units daily tomorrow.  -Aspart insulin sliding scale to be continued at home as he was previously taking.  -Hemoglobin A1c historically is about 7 indicating adequate control.  -Resume dulaglutide after discharge.  -Recheck hemoglobin A1c as an outpatient in a few weeks.     Hypertension.  -Continue increased metoprolol to 50 mg twice a day for now then transition back to his PTA Toprol 100 mg daily and resume prior to admission losartan at 25 mg daily upon discharge.       Chronic kidney disease.  -Creatinine stable at 1.1.  -Avoid nephrotoxins as able.  -Recheck metabolic panel intermittently.     Hyponatremia.  Hypokalemia.  Hypomagnesemia.  Hypocalcemia.  -Potassium replacement protocol.  -Magnesium replacement protocol.  -Phosphorus replacement protocol.  -Ionized calcium low, had calcium gluconate 1 g IV on 9/15 and 9/16.  -Started calcium carbonate 500 mg twice a day but will hold on discharge as diet improving.  -Recheck metabolic panel and electrolytes intermittently as outpatient     Chronic thrombocytopenia.  -Platelets appear to be near baseline.  -Recheck CBC intermittently as outpatient.     Hyperlipidemia.  -Resume rosuvastatin on discharge.    The patient was seen, examined, and counseled on this day. The hospitalization and plan of care was reviewed with the patient extensively. All questions were addressed and the patient agreed to follow-up as noted above.      Total time spent in face to face contact with the patient and coordinating discharge was:  35 Minutes    Audie Hooker DO, MPH  [facility] Hospitalist  [address] [place] [address]  09/20/2023

## 2023-09-20 NOTE — PROGRESS NOTES
COLON & RECTAL SURGERY  PROGRESS NOTE    September 20, 2023  Post-op Day # 12    SUBJECTIVE:  Patient doing well. Feels ready to go home. Tolerating LFD without n/v. +BM/gas. Pain controlled. Still weaning from TPN.    OBJECTIVE:  Temp:  [98.1  F (36.7  C)-98.3  F (36.8  C)] 98.3  F (36.8  C)  Pulse:  [67-83] 83  Resp:  [17] 17  BP: (139-164)/(67-82) 148/76  SpO2:  [98 %] 98 %    Intake/Output Summary (Last 24 hours) at 9/20/2023 0917  Last data filed at 9/20/2023 0841  Gross per 24 hour   Intake 360 ml   Output 1125 ml   Net -765 ml       GENERAL:  Awake, alert, no acute distress, sitting up in his chair  HEAD: Nomocephalic atraumatic  SCLERA: anicteric  EXTREMITIES: warm and well perfused  ABDOMEN:  Soft, appropriately tender, non-distended, no rebound or guarding, no peritoneal signs  INCISION:  C/d/i    LABS:  Lab Results   Component Value Date    WBC 9.0 09/20/2023    WBC 12.7 06/01/2021     Lab Results   Component Value Date    HGB 8.7 09/20/2023    HGB 13.5 06/01/2021     Lab Results   Component Value Date    HCT 27.3 09/20/2023    HCT 41.3 06/01/2021     Lab Results   Component Value Date    PLT 75 09/20/2023     06/01/2021     Last Basic Metabolic Panel:  Lab Results   Component Value Date     09/20/2023     06/01/2021      Lab Results   Component Value Date    POTASSIUM 4.2 09/20/2023    POTASSIUM 3.9 08/02/2022    POTASSIUM 4.2 06/01/2021     Lab Results   Component Value Date    CHLORIDE 100 09/20/2023    CHLORIDE 103 08/02/2022    CHLORIDE 104 06/01/2021     Lab Results   Component Value Date    MAGDALENO 8.5 09/20/2023    MAGDALENO 8.9 06/01/2021     Lab Results   Component Value Date    CO2 22 09/20/2023    CO2 24 08/02/2022    CO2 30 06/01/2021     Lab Results   Component Value Date    BUN 23.8 09/20/2023    BUN 20 08/02/2022    BUN 26 06/01/2021     Lab Results   Component Value Date    CR 0.99 09/20/2023    CR 1.51 06/01/2021     Lab Results   Component Value Date     09/20/2023      09/20/2023     08/02/2022     06/01/2021       ASSESSMENT/PLAN: edwar Garza is a 83 year old male POD # 12 s/p laparoscopic converted to open extended right colectomy for management of a T3 N0 colon cancer. His postop course has been notable for prolonged ileus requiring NGT, with PICC placed and TPN. CT scan on 9/14 with some dilated small bowel consistent with an ileus. Resolved ileus.     - Low fiber diet  - Wean off TPN  - Continue 10mg of Prednisone  - Pain management as needed  - Encourage ambulation.  - SQH for ppx, does not need at discharge given thrombocytopenia  - Possible discharge later today once weaned off TPN.      For questions/paging, please contact the [facility] office at [phone].    Beth Tovar PA-C  [facility]  Phone: [phone]       ADDENDUM:  Length of stay: 12 days  Indicate Y or N for the following:  UTI  No  C diff  No  PNA  No  SSI No  DVT No  PE  No  CVA No  MI No  Enterocutaneous fistula  No  Peripheral nerve injury  No  Abscess (not adjacent to anastomosis)  No  Leak No    Treated with:   Antibiotics N/A   Drain  N/A   Reoperation  N/A  Death within 30 days No  Reintubation  No  Reoperation  No   Procedure N/A    FOR CANCER CASES:  T stage: 3  N stage: 0   Total number of nodes: 26   Total positive: 0  M stage: 0  R: 0  TME grade, if known (1,2,3): N/A  MSI (pos, neg): ?

## 2023-09-20 NOTE — PROGRESS NOTES
Care Management Discharge Note    Discharge Date: 09/20/2023       Discharge Disposition: Home,     Discharge Services: None    Discharge DME: None    Discharge Transportation: family or friend will provide    Private pay costs discussed: Not applicable    Does the patient's insurance plan have a 3 day qualifying hospital stay waiver?  No    PAS Confirmation Code:    Patient/family educated on Medicare website which has current facility and service quality ratings: no    Handoff Referral Completed: Yes    Additional Information:  Medically ready for discharge today. Plan is for home with family . No current CM needs identified.    Latesha Gallo RN BSN OCN  Care Coordinator  [facility]  [phone]

## 2023-09-25 NOTE — PROGRESS NOTES
Clinic Care Coordination Contact  Care Coordination Clinician Chart Review    Situation: Patient chart reviewed by care coordinator.    Background: Clinic Care Coordination Referral received from inpatient care team for transition handoff communication following hospital admission.    Assessment: Upon chart review, patient is not a candidate for Primary Care Clinic Care Coordination enrollment due to reason stated below:  Patient is receiving duplicative services from [facility] Care Management. Chart review indicates Pt/Family report of already connecting with RN Care Manager for hospital follow up assessment.    Plan/Recommendations: Clinic Care Coordination Referral/order cancelled. RN/SW CC will perform no further monitoring/outreaches at this time and will remain available as needed. If new needs arise, a new Care Coordination Referral may be placed.    LOYDA Morales  Clinic Care Coordinator  [facility]  [phone]  [email or web address]

## 2023-09-25 NOTE — TELEPHONE ENCOUNTER
"Patient had colon surgery on 09/08/23.  He is wondering when he can proceed with receiving immunizations -Covid, Flu, and RSV.    In his message he states that recovery has been \"going very slow\".  He has no energy.  Please advise.  Thank you.  QUIRINO Acevedo RN    "

## 2023-09-25 NOTE — TELEPHONE ENCOUNTER
I do agree with getting those vaccines. However if feasible I would wait about 3-4 weeks - only because particularly the COVID19 vaccine may have side effects of fatigue, low energy, fever and it may disrupt his recovery.      Luma Kauffman MD  Internal Medicine & Pediatrics  [facility]  She/her

## 2023-09-26 NOTE — PATIENT INSTRUCTIONS
Due to low blood pressure, for the next 3-4 days cut your metoprolol pill in 1/2   If you have a blood pressure cuff at home, you can go back to a full pill if your BP starts to go too high

## 2023-09-26 NOTE — TELEPHONE ENCOUNTER
"My Chart message sent with concern that \"recovery is not going well\"-patient is having increased fatigue, and \"no energy\" since colon surgery last month.   Marcie states \"I am afraid he will end up back in the hospital\"   Patient was discharged on 09/20, and fatigue and lack of energy is  worsening.    Wife is concerned the Hgb may be low again.  Patient is ambulatory.  Patient has an appointment at [facility] today at 1.  She did call the Hematologist, Dr. Badillo, who advised her to follow-up with the surgeon.  Post-op appointment is scheduled on 10/05, and Marcie has left a message at the surgical office as well-but has not gotten a call back.  Patient is scheduled for hospital follow-up with Dr. Dalton on 10/02.  I offered an appointment  for hospital follow-up this afternoon instead, and they will make that appointment with Dr. Dalton.  No further questions.  Will call back if any other questions or concerns.   QUIRINO Acevedo RN    "

## 2023-09-26 NOTE — TELEPHONE ENCOUNTER
MTM referral from: Transitions of Care (recent hospital discharge or ED visit)    MT referral outreach attempt #2 on September 26, 2023 at 1:48 PM      Outcome: Patient not reachable after several attempts, will route to [facility] Pharmacist/Provider as an FYI.  [facility] scheduling number is [phone].  Thank you for the referral.    Use bcbs part d map for the carrier/Plan on the flowsheet      [employer] Message Sent    WILBERTO Carrion

## 2023-09-26 NOTE — PROGRESS NOTES
Assessment & Plan       ICD-10-CM    1. Anemia, unspecified type  D64.9 CBC with Platelets & Differential      2. Type 2 diabetes mellitus with other specified complication, with long-term current use of insulin (H)  E11.69 Comprehensive metabolic panel    Z79.4       3. Malignant neoplasm of transverse colon (H)  C18.4       4. Weight loss  R63.4            MED REC REQUIRED  Post Medication Reconciliation Status:  Discharge medications reconciled and changed, see notes/orders    Patient Instructions   Due to low blood pressure, for the next 3-4 days cut your metoprolol pill in 1/2   If you have a blood pressure cuff at home, you can go back to a full pill if your BP starts to go too high    After OV:  Hgb 9.8.   Improving.   WBC 18, unclear cause for increase. No focal infectious symptoms on his examination.     Documentation of Face to Face and Certification for Home Health Services    I certify that patient: Austin Garza is under my care and that I, or a nurse practitioner or physician's assistant working with me, had a face-to-face encounter that meets the physician face-to-face encounter requirements with this patient on: 9/26/2023.    This encounter with the patient was in whole, or in part, for the following medical condition, which is the primary reason for home health care: Colon cancer, anemia.    I certify that, based on my findings, the following services are medically necessary home health services: Nursing.    My clinical findings support the need for the above services because: Nurse is needed: For complex aftercare of surgical procedures because the patient needs instruction and cannot perform care on their own due to: anemia and fatigue..    Further, I certify that my clinical findings support that this patient is homebound (i.e. absences from home require considerable and taxing effort and are for medical reasons or Christian services or infrequently or of short duration when for other  reasons) because: Requires assistance of another person or specialized equipment to access medical services because patient: Requires supervision of another for safe transfer...    Based on the above findings. I certify that this patient is confined to the home and needs intermittent skilled nursing care, physical therapy and/or speech therapy.  The patient is under my care, and I have initiated the establishment of the plan of care.  This patient will be followed by a physician who will periodically review the plan of care.  Physician/Provider to provide follow up care: Luma Kauffman    Attending hospital physician (the Medicare certified [facility] provider): Julio Dalton MD  Physician Signature: See electronic signature associated with these discharge orders.  Date: 9/26/2023    Julio Dalton MD  [facility] CYRUS Leblanc is a 83 year old, presenting for the following health issues:  Hospital F/U      9/26/2023     3:43 PM   Additional Questions   Roomed by Elizabeth   Accompanied by spouse         9/26/2023     3:43 PM   Patient Reported Additional Medications   Patient reports taking the following new medications none       HPI       Hospital Follow-up Visit:    Hospital/Nursing Home/IP Rehab Facility: [facility]  Date of Admission: 9/8/23  Date of Discharge: 9/20/23  Reason(s) for Admission: colon ca    Was your hospitalization related to COVID-19? No   Problems taking medications regularly:  None  Medication changes since discharge: None  Problems adhering to non-medication therapy:  None    Summary of hospitalization:  [facility] discharge summary reviewed  Diagnostic Tests/Treatments reviewed.  Follow up needed: Labs  Other Healthcare Providers Involved in Patient s Care:         Surgical follow-up appointment - Colorectal surgery  Update since discharge: fluctuating course.     Plan of care communicated with patient and his wife.     "  Hospitalized due to dx of colon cancer. Underwent partial colectomy.   Somewhat prolonged hospital stay d/t additional issues following the surgery.    GI function is slowly resuming. Having some difficulty with stools. Is on a lower fiber diet still.   No abdominal pain, nausea, vomiting. No blood in the stools noted.    Anemia. Hemoglobin historically had been normal. Was low following surgery with a bump up from 8.0 to 8.7 the day of discharge.  Lab Results   Component Value Date    HGB 8.7 09/20/2023    HGB 8.0 09/18/2023    HGB 13.5 06/01/2021     He is noting significant dyspnea on exertion. Needs to catch his breath even with going from the living room to the kitchen, then will need to sit down in the kitchen to catch his breath.  He has a known hx of CAD. No sx of chest pains, leg edema, orthopnea. No prior pulmonary diagnosis.     Weight continues to decrease. He has been working to increase calorie intake, but is down 4# over the past week.  Wt Readings from Last 4 Encounters:   09/26/23 82 kg (180 lb 11.2 oz)   09/20/23 83.9 kg (184 lb 15.5 oz)   09/01/23 87.8 kg (193 lb 9.6 oz)   08/31/23 88 kg (194 lb 1.6 oz)     We reviewed options to help increase calorie intake.     Has type 2 DM. Has been under good control. No significant issues noted w/ blood sugars in the past few days.   Lab Results   Component Value Date    A1C 7.1 07/07/2023    A1C 6.4 08/02/2022    A1C 5.9 04/05/2021    A1C 6.7 01/15/2021     He is interested in assistance at home.         Objective    BP (!) 84/56 (BP Location: Right arm, Patient Position: Sitting, Cuff Size: Adult Regular)   Pulse 101   Temp 98.2  F (36.8  C) (Tympanic)   Resp 20   Ht 1.753 m (5' 9\")   Wt 82 kg (180 lb 11.2 oz)   SpO2 99%   BMI 26.68 kg/m    Body mass index is 26.68 kg/m .  Physical Exam   GENERAL: healthy, alert and no distress  EYES: PERRL, EOMI  HENT: ear canals and TM's normal. No nasal discharge. OP moist.  NECK: no adenopathy  RESP: lungs " clear to auscultation - no rales, rhonchi or wheezes  CV: underlying regular rate and rhythm with occasional early systoles, normal S1 S2, no murmur, no peripheral edema and peripheral pulses strong  ABDOMEN: soft, nontender, bowel sounds normal  MS: no gross musculoskeletal defects noted  SKIN: no suspicious lesions or rashes  NEURO: Normal strength and tone  PSYCH: mentation appears normal, affect normal/bright      Preliminary CBC:  WBC 18  Hgb 9.8  Hematocrit 31  Plt 128

## 2023-09-27 NOTE — TELEPHONE ENCOUNTER
ordered BMP & CBC and would like to be rechecked before the weekend.     Will reach out to family tomorrow to help set up the lab-only appointment.     Lab result notes from  this evening:  It would be good to recheck your blood counts and kidney function before the weekend. This could be done with a lab visit. It would not require fasting.     SHAMIR Jauregui  Patient Advocate Liason (PAL)  [facility]  Ph. [phone] / Fax. [phone]

## 2023-09-27 NOTE — TELEPHONE ENCOUNTER
"Kat([phone]) calling back to check whether the HC referral has been faxed to them. Notified her that HC referral for \"skilled nursing complex aftercare\" has been faxed.     She says that she is not a nurse(RN), but she was visiting pt today, went over his hospital discharge directions and helped pt to set up his pill box. While reconciliation his med list, she found out that pt has been taking losartan 25 mg 2 tabs(50 mg). So, she helped to set up losartan 25 mg one tablet in his pill box. Also, she noticed that  decreased his metoprolol from 100 mg to 50 mg every day during his LOV(yesterday), so she helped to set up one half pill(50 mg) of metoprolol.     Inquired to Kat how she initiated the HC visit w/o us(pcp or ED) placing a HC referral. She says that some insurances has a list of pts who they would like to track for close monitoring, when one of those pts end up in the hospital, [employer] [facility] will get a notification from the insurance company to f/up with pt to make sure pt needs HC involved. She was keep saying that \"its a complex process & hard to explain\".     Currently pt has a f/up appointment with  on Monday(10/2).     SHAMIR Jauregui  Patient Advocate Liason (PAL)  [facility]  Ph. [phone] / Fax. [phone]        "

## 2023-09-27 NOTE — TELEPHONE ENCOUNTER
I received a call from Kat Cerrato, , with [email or web address].  She is visiting with patient now and is requesting orders for Skilled nurse visit for assessment of needs post-hospitalization.  Please fax orders and clinic note to [facility] at [phone], if in agreement.  Dr. Dalton-patient had a hospital follow-up visit with you yesterday.   If in agreement with this order, you will need to addend your note for a F2F visit indicating the need for home care.  Thank you.    If in agreement, I will call Kat at [phone], and fax orders to number above.  QUIRINO Acevedo RN

## 2023-09-27 NOTE — TELEPHONE ENCOUNTER
TREVORTCB for Kat with [facility] and advised her that Dr. Dalton has provided orders for home care assessment.  Faxed orders and office appointment note to number listed below.  QUIRINO Acevedo RN

## 2023-09-28 NOTE — DISCHARGE INSTRUCTIONS
No signs of heart attack or blood clots or other emergency today, however I don't have an explanation for your symptoms.     We discussed a brief hospitalization for further testing vs following-up with your doctors next week of which you prefer.    Return to emergency department for worsening shortness of breath, fatigue, chest pain, fever greater than 100.4, or for any other concerns.  Otherwise follow-up with your doctors next week as scheduled.

## 2023-09-28 NOTE — ED PROVIDER NOTES
History     Chief Complaint:  Shortness of Breath       HPI   Austin Garza is a 83 year old male with a past medical history of colon cancer, AAA, CKD, leukemia, HTN, hyperlipidemia, diabetes, CKD, and atrial fibrillation who presents with shortness of breath. The patient states that he had a colectomy on September 8th and was discharged on the 20th feeling good. Patient said a few days after discharge he began to get shortness of breath with exertion. He states he has to walk very slow in order to not have shortness of breath. He is still having diarrhea from his surgery but no vomiting. He denies any chest pain. Patient states that he had a blood draw at clinic yesterday but his numbers came back better than his usual. Patient is no longer taking any blood thinners. Patient denies any history of asthma or COPD.      Independent Historian:   None - Patient Only    Review of External Notes:   Reviewed telephone notes from yesterday.       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  HENT:   Mouth/Throat: Moist mucous membranes.    Eyes: EOMI, nonicteric sclera  Cardiovascular: Normal rate, regular rhythm, no murmurs, rubs, or gallops  Pulmonary/Chest: Effort normal and breath sounds normal. No respiratory distress. No wheezes. No rales.   Abdominal: Soft. Nontender, nondistended, no guarding or rigidity.   Musculoskeletal: Normal range of motion.   Neurological: Alert. Moves all extremities spontaneously.   Skin: Skin is warm and dry. No rash noted.         Emergency Department Course   ECG  ECG results from 09/28/23   EKG 12-lead, tracing only     Value    Systolic Blood Pressure     Diastolic Blood Pressure     Ventricular Rate 99    Atrial Rate 99    NM Interval 186    QRS Duration 70        QTc 418    P Axis 56    R AXIS 12    T Axis 24    Interpretation ECG      Sinus rhythm  Normal ECG  When compared with ECG of 28-JUN-2023 17:07,  Inverted T waves have replaced nonspecific T wave abnormality in Inferior leads       *Note: Due to a large number of results and/or encounters for the requested time period, some results have not been displayed. A complete set of results can be found in Results Review.         Imaging:  CT Chest Pulmonary Embolism w Contrast   Preliminary Result   IMPRESSION:   1.  No evidence for pulmonary embolism, acute thoracic aortic   abnormality, or acute airspace disease.   2.  Mildly enlarged ascending thoracic aorta measures smaller than on   9/26/2021.         Report per radiology    Laboratory:  Labs Ordered and Resulted from Time of ED Arrival to Time of ED Departure   BASIC METABOLIC PANEL - Abnormal       Result Value    Sodium 137      Potassium 5.0      Chloride 102      Carbon Dioxide (CO2) 22      Anion Gap 13      Urea Nitrogen 24.7 (*)     Creatinine 1.42 (*)     GFR Estimate 50 (*)     Calcium 9.7      Glucose 154 (*)    CBC WITH PLATELETS AND DIFFERENTIAL - Abnormal    WBC Count 14.0 (*)     RBC Count 3.50 (*)     Hemoglobin 9.4 (*)     Hematocrit 29.6 (*)     MCV 85      MCH 26.9      MCHC  31.8      RDW 18.7 (*)     Platelet Count 101 (*)    TROPONIN T, HIGH SENSITIVITY - Abnormal    Troponin T, High Sensitivity 23 (*)    HEPATIC FUNCTION PANEL - Abnormal    Protein Total 6.1 (*)     Albumin 3.6      Bilirubin Total 0.4      Alkaline Phosphatase 76      AST 32      ALT 71 (*)     Bilirubin Direct <0.20     DIFFERENTIAL - Abnormal    % Neutrophils 26      % Lymphocytes 72      % Monocytes 1      % Eosinophils 1      % Basophils 0      Absolute Neutrophils 3.6      Absolute Lymphocytes 10.1 (*)     Absolute Monocytes 0.1      Absolute Eosinophils 0.1      Absolute Basophils 0.0      RBC Morphology Confirmed RBC Indices      Platelet Assessment        Value: Automated Count Confirmed. Platelet morphology is normal.    Elliptocytes Moderate (*)     Smudge Cells Present (*)     Teardrop Cells Slight (*)    NT PROBNP INPATIENT - Normal    N terminal Pro BNP Inpatient 588     TROPONIN T, HIGH SENSITIVITY - Normal    Troponin T, High Sensitivity 20        Emergency Department Course & Assessments:    Interventions:  Medications   sodium chloride 0.9 % bag 500 mL for CT scan flush use (100 mLs As instructed $Given 9/28/23 1219)   sodium chloride 0.9% BOLUS 1,000 mL (1,000 mLs Intravenous $New Bag 9/28/23 1157)   iopamidol (ISOVUE-370) solution 500 mL (62 mLs Intravenous $Given 9/28/23 1219)        Assessments:  1143 Obtained the patients history and performed initial exam  1503 Rechecked the patient and updated him on findings    Independent Interpretation (X-rays, CTs, rhythm strip):  none    Social Determinants of Health affecting care:   None    Disposition:  The patient was discharged to home.     Impression & Plan      Medical Decision Making:  Patient presents with chief complaint dyspnea, worsening after recent colon resection.  He was sent here by his oncology clinic after having labs checked and was found to have an elevated white count as well as elevated kidney function.  He denies any abdominal  pain.  No vomiting.  He has been afebrile.  Exam is overall unremarkable.  D-dimer obtained which is elevated prompting CT of the chest which does not show any acute etiology.  Troponin x2 is flat, therefore symptoms unlikely to represent ACS.  With patient's cardiac history, we discussed ongoing possibility of cardiac nature of his symptoms.  Admission offered which patient declines, stating he has multiple appointments with his doctors next week.  His leukocytosis is improved compared to yesterday and he is not having any fevers/chills/body aches, or other symptoms of infection.  His creatinine is also mildly improved.  He did receive fluid bolus here.  As patient is wishing for discharge, I encouraged him to stay well-hydrated over the weekend and to follow-up with his doctors next week as scheduled for recheck. He is in stable condition at the time of discharge, indications for return to the ED were discussed as well as follow up. All questions were answered and he and his wife are in agreement with the plan.        Diagnosis:    ICD-10-CM    1. Dyspnea on exertion  R06.09       2. Fatigue, unspecified type  R53.83       3. Leukocytosis, unspecified type  D72.829       4. Elevated serum creatinine  R79.89            Scribe Disclosure:  I, Malachi Miguel, am serving as a scribe at 11:34 AM on 9/28/2023 to document services personally performed by Gary Church MD based on my observations and the provider's statements to me.          Gary Church MD  10/02/23 1092

## 2023-09-28 NOTE — TELEPHONE ENCOUNTER
Spouse sent MC message through her chart regarding Benji as below. Called Marcie at [phone].    She is planning to take Benji to ER around 10 am as per his surgeon's request. He is stable now. Will keep his f/up appointment with  on Mon(10/2) for now. She will reach out to us with any questions or concerns.     I have to take my , Benji, to the ER. As a result of a phone call from his surgeon clinic they are worried about his recovery from colon cancer surgery.     SHAMIR Jauregui  Patient Advocate Liason (PAL)  [facility]  Ph. [phone] / Fax. [phone]

## 2023-09-28 NOTE — ED TRIAGE NOTES
Pt presents with concerns for SOB and fatigue. Pt recently had a procedure for colon resection to treat colon cancer. Pt was seen by onc and WBCs and crt were elevated. A & Ox4.      Triage Assessment       Row Name 09/28/23 1019       Triage Assessment (Adult)    Airway WDL WDL       Respiratory WDL    Respiratory WDL X;all    Rhythm/Pattern, Respiratory shortness of breath       Skin Circulation/Temperature WDL    Skin Circulation/Temperature WDL WDL       Cardiac WDL    Cardiac WDL WDL       Peripheral/Neurovascular WDL    Peripheral Neurovascular WDL WDL       Cognitive/Neuro/Behavioral WDL    Cognitive/Neuro/Behavioral WDL WDL

## 2023-10-02 NOTE — TELEPHONE ENCOUNTER
Pt has hospital f/up appointment today with .    Shania RN  Patient Advocate Liason (PAL)  [facility]  Ph. [phone] / Fax. [phone]

## 2023-10-02 NOTE — PATIENT INSTRUCTIONS
Recheck CBC blood counts and creatinine (kidney function) tomorrow at the infusion    Flu shot today    Go to a pharmacy ([facility], [place]) for COVID19 booster

## 2023-10-02 NOTE — PROGRESS NOTES
"  Assessment & Plan     Leukocytosis, unspecified type  No localizing source of infection. Patient feels stable in symptoms. Work up in ED (emergency department) negative for acute processes. Recommend recheck CBC tomorrow at IV iron infusion.  - CBC with platelets; Future    Elevated serum creatinine  Received 1L IVF in ED (emergency department).  - Creatinine; Future    Need for influenza vaccination  - INFLUENZA VACCINE 65+ (FLUZONE HD)           MED REC REQUIRED  Post Medication Reconciliation Status: patient was not discharged from an inpatient facility or TCU  BMI:   Estimated body mass index is 26.6 kg/m  as calculated from the following:    Height as of this encounter: 1.753 m (5' 9\").    Weight as of this encounter: 81.7 kg (180 lb 1.6 oz).           Luma Kauffman MD  [facility] CYRUS Leblanc is a 83 year old, presenting for the following health issues:  ER F/U        10/2/2023     1:33 PM   Additional Questions   Roomed by Elizabeth   Accompanied by Spouse (Marcie)         10/2/2023     1:33 PM   Patient Reported Additional Medications   Patient reports taking the following new medications none       HPI   Hospital Follow-up Visit:    Hospital/Nursing Home/IP Rehab Facility: [facility]  Date of Admission: 9/28/23  Date of Discharge: 9/28/23  Reason(s) for Admission: SIMMONS     Still fatigued and some shortness of breath with walking; uses cane  No chest pain  Overall feels symptoms are stable        Objective    /54 (BP Location: Right arm, Patient Position: Sitting, Cuff Size: Adult Regular)   Pulse 90   Temp 97.7  F (36.5  C) (Oral)   Resp 18   Ht 1.753 m (5' 9\")   Wt 81.7 kg (180 lb 1.6 oz)   SpO2 99%   BMI 26.60 kg/m    Body mass index is 26.6 kg/m .  Physical Exam   GENERAL: alert and no distress, elderly  RESP: lungs clear to auscultation - no rales, rhonchi or wheezes  CV: regular rate and rhythm, normal S1 S2, no S3 or S4, no murmur, click " or rub, no peripheral edema and peripheral pulses strong

## 2023-10-03 NOTE — PROGRESS NOTES
Infusion Nursing Note:  Austin Garza presents today for Venofer #2.    Patient seen by provider today: No   present during visit today: Not Applicable.    Note: N/A.      Intravenous Access:  Labs drawn without difficulty.  Peripheral IV placed.    Treatment Conditions:  Not Applicable.      Post Infusion Assessment:  Patient tolerated infusion without incident.  Blood return noted pre and post infusion.  Site patent and intact, free from redness, edema or discomfort.  No evidence of extravasations.  Access discontinued per protocol.       Discharge Plan:   Discharge instructions reviewed with: Patient and Family.  Patient and/or family verbalized understanding of discharge instructions and all questions answered.  AVS to patient via [employer].  Patient will return 10/11/23 for next appointment.   Patient discharged in stable condition accompanied by: wife.  Departure Mode: Ambulatory with cane.      Kari Schoen, RN

## 2023-10-04 NOTE — TELEPHONE ENCOUNTER
[facility]Kat Cerrato[facility]  yesterday(10/3) at 4:25 pm that she would like clarification on pt's Metoprolol dose. Pt is currently taking Metoprolol 50 mg daily. She wants to know whether  would like him to go back to 100 mg daily? His home BP during her visit was 124/64, pulse 64. Requesting call back at [phone].     Lisette - no notes in 10/2 OV notes about metoprolol dose. Please advise [facility] nurse. Thanks.     Notes from 9/26/23 OV:  Due to low blood pressure, for the next 3-4 days cut your metoprolol pill in 1/2              If you have a blood pressure cuff at home, you can go back to a full pill if your BP starts to go too high    BP Readings from Last 4 Encounters:   10/03/23 114/71   10/02/23 108/54   09/28/23 131/85   09/26/23 (!) 84/56     Shania RN  Patient Advocate Liason (PAL)  [facility]  Ph. [phone] / Fax. [phone]

## 2023-10-04 NOTE — TELEPHONE ENCOUNTER
Since his BP is still low with 50 mg, I recommend he remain at that dose. If his BP starts to increase >140 systolic on a regular basis he could increase the dose.

## 2023-10-04 NOTE — TELEPHONE ENCOUNTER
Patient had office visit 10/02.  Per AVS, Metoprolol dose is 100 mg daily.  At office appointment on 09/26 with Dr. Dalton, patient was advised to decrease this dose to 50 mg daily for 3-4 days.  BP Readings from Last 3 Encounters:   10/03/23 114/71   10/02/23 108/54   09/28/23 131/85    Should patient continue Metoprolol at 50 mg dose?  Thank you.  QUIRINO Acevedo RN

## 2023-10-04 NOTE — TELEPHONE ENCOUNTER
LM for Kat Cerrato RN with [facility] with directions noted below.  Advised her to call me with any other questions or concerns.  QUIRINO Acevedo RN

## 2023-10-11 NOTE — PROGRESS NOTES
Infusion Nursing Note:  Austin Garza presents today for Venofer #3 of 3.    Patient seen by provider today: No   present during visit today: Not Applicable.    Note: N/A.      Intravenous Access:  Peripheral IV placed.    Treatment Conditions:  Not Applicable.      Post Infusion Assessment:  Patient tolerated infusion without incident.  Blood return noted pre and post infusion.  Site patent and intact, free from redness, edema or discomfort.  No evidence of extravasations.  Access discontinued per protocol.       Discharge Plan:   Discharge instructions reviewed with: Patient.  Patient and/or family verbalized understanding of discharge instructions and all questions answered.  AVS to patient via [employer].  Patient will return 10/20 for next appointment with Dr. Badillo.  Patient discharged in stable condition accompanied by: self and wife.  Departure Mode: Ambulatory and Cane.      Kimberley Morel RN

## 2023-10-15 NOTE — TELEPHONE ENCOUNTER
Patient Quality Outreach      Summary:    Patient has the following on his problem list/HM:   Diabetes  Last A1C:  Lab Results   Component Value Date    A1C 5.6 2020    A1C 5.5 2020   Last LDL:    Lab Results   Component Value Date    LDL 52 2019   Is the patient on a Statin? Yes          Is the patient on Aspirin? Yes  Medications     HMG CoA Reductase Inhibitors     atorvastatin (LIPITOR) 20 MG tablet       Salicylates     aspirin (ASA) 81 MG tablet         Last three blood pressure readings:  BP Readings from Last 3 Encounters:   09/15/20 131/77   20 (!) 146/68   06/10/20 137/77        Tobacco Use      Smoking status: Former Smoker        Packs/day: 0.50        Years: 20.00        Pack years: 10        Quit date: 1975        Years since quittin.8      Smokeless tobacco: Former User  Hypertension   Last three blood pressure readings:  BP Readings from Last 3 Encounters:   09/15/20 131/77   20 (!) 14668   06/10/20 137/77   Blood pressure: Monitor  HTN Guidelines:  ? 139/89     Patient is due/failing the following:   Eye Exam and Annual wellness, date due:     Type of outreach:    Sent [employer] message.    Questions for provider review:    None                                                                                               Coby Allen CMA    Chart routed to Care Team.           No

## 2023-10-16 NOTE — TELEPHONE ENCOUNTER
"I spoke with patient in follow-up to his hospitalization for surgery on 09/08, and then ER visit on 09/28.   He reports that he feels that he is getting better slowly.    Reports that there is \"nothing unusual\" occurring at this time.  Feels that his energy level is slowly improving.  Does note \"some shortness of breath\" at times but only if he is exertional, and he feels that this symptom has improved.   He discussed this with Dr. Kauffman at office visit on 10/02.  Had infusion therapy last week.  Hemoglobin   Date Value Ref Range Status   10/03/2023 9.2 (L) 13.3 - 17.7 g/dL Final   09/28/2023 9.4 (L) 13.3 - 17.7 g/dL Final   06/01/2021 13.5 13.3 - 17.7 g/dL Final   05/04/2021 13.5 13.3 - 17.7 g/dL Final    Patient reports that he doesn't have any concerns at this time.  He has a home health nurse that visits once weekly.  Advised patient to call the clinic with any questions or concerns, and he is in agreement with this.  He states that he no longer has any follow-ups with the surgeon.  Patient has follow-up with Dr. Badillo, Oncology,  this week.  No further questions.  Will call back if any other questions or concerns.   QUIRINO Acevedo RN    "

## 2023-10-20 NOTE — NURSING NOTE
"Oncology Rooming Note    October 20, 2023 3:07 PM   Austin Garza is a 83 year old male who presents for:    Chief Complaint   Patient presents with    Oncology Clinic Visit     Initial Vitals: /82   Pulse 96   Temp 97.4  F (36.3  C) (Oral)   Resp 16   Wt 83.2 kg (183 lb 6.4 oz)   SpO2 97%   BMI 27.08 kg/m   Estimated body mass index is 27.08 kg/m  as calculated from the following:    Height as of 10/2/23: 1.753 m (5' 9\").    Weight as of this encounter: 83.2 kg (183 lb 6.4 oz). Body surface area is 2.01 meters squared.  No Pain (0) Comment: Data Unavailable   No LMP for male patient.  Allergies reviewed: Yes  Medications reviewed: Yes    Medications: Medication refills not needed today.  Pharmacy name entered into [employer]: [id number] - [place], [place] - [phone] AT [facility]    Clinical concerns: follow up        Gail Ley            "

## 2023-10-20 NOTE — LETTER
10/20/2023         RE: Austin Garza  [address] [place] [address]        Dear Colleague,    Thank you for referring your patient, Austin Garza, to the [facility]. Please see a copy of my visit note below.    [facility]    Hematology/Oncology Established Patient Note      Today's Date: 10/20/23    Reason for Follow-up: CLL      HISTORY OF PRESENT ILLNESS: Austin Garza is an 83 year old male with CLL previously on ibrutinib and followed by Dr. Perry.    He has PMHx of DMII, HTN who presented with leukocytosis.  In November 2017, he was found to have elevated WBC of 61.7K, hemoglobin of 10.4, and platelet of 115K.  There were no other CBC results available between 2010 and 2017.  Prior to 2010, he had normal CBC, with normal to mild anemia, and mild thrombocytopenia.   He was asymptomatic.    He underwent bone marrow biopsy on 11/21/17, which was consistent with chronic lymphocytic leukemia/small lymphocytic lymphoma, with 13% ringed sideroblasts identified.  The presence of increased numbers of ringed sideroblasts raises the possibility of an associated myelodysplastic syndrome.  Dysplastic changes are not identified though.  Many cases exhibiting ringed sideroblasts are metabolic origin, without significant risk of progression to acute leukemia, and these typically do not show dysplastic morphology as is the case with this specimen.  15% ringed sideroblasts are required for a diagnosis for RARS, which this specimen does not meet.  Flow cytometry is also consistent with CLL/SLL.  Cytogenetics show two (10%) of the metaphase cells comprise a clone characterized by a deletion within the proximal long arm of a chromosome 13 as the sole karyotypic abnormality.  Three (15%) metaphases had loss of the Y chromosome as the sole abnormality.    CT scan on 11/29/17 shows mildly enlarged left axillary lymph node and periaortic lymph nodes in the  retroperitoneum of the abdomen.  Spleen is also enlarged.    On his staging CT scan for CLL, he was incidentally found to have a large infrarenal abdominal aortic aneurysm, measuring 7.6 cm.  He was seen by Dr. Thomas, and he underwent EVAR on 12/4/17.     He started ibrutinib on 5/4/20.  It was held 5/28/20-6/4/20 for tooth extraction.    He was hospitalized on 7/30/2021 for shortness of breath, new CHF, A. fib with RVR and ibrutinib was put on hold.  He was seen by cardiology and eventually underwent cardioversion for the A. fib.  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  He was started on Eliquis and metoprolol.  His Plavix was stopped.    He went to the [facility] ED on 9/26/21 and found to have pericardial effusion.  Due to lack of beds, he was transferred to [facility] for acute pericardial effusion and pericarditis.  He was started on colchicine and prednisone taper x 6 weeks.  He was also given Bactrim for PJP prophylaxis due duration of prednisone course.  He was discharged on 10/1/21.      Since his last visit in clinic, he states he has had multiple hospitalizations for back pain. He informs me he has cardiology follow up as he was discovered to have coronary artery disease. He has had angiogram. There are recommendations in regards to CABG vs PCI. He states he is uncertain if he will move forward with this. He will be seen in 11/3/22.      INTERIM HISTORY:   Patient was hospitalized for acute blood loss anemia in the setting of GIB. He underwent colonoscopy on 7/28 with path returning as colonic mass with adherent clot in the transverse colon, which returned positive for adenocarcinoma. He had CT CAP done yesterday at [facility].     On 9/8/23, he underwent diagnostic laparoscopy with open extended right colectomy with ileocolic anastomosis, full mobilization of the splenic flexure (Dr. Frankel). He had prolonge dileus requiring NGT. He was started on TPN. He was discharged from the  hospital 9/20/23.    He states he has had dysuria since he returned home. Otherwise, he has healed well.       REVIEW OF SYSTEMS:   14 point ROS was reviewed and is negative other than as noted above in HPI.       HOME MEDICATIONS:  Current Outpatient Medications   Medication Sig Dispense Refill     Alcohol Swabs (ALCOHOL PREP) 70 % PADS        aspirin 81 MG EC tablet Take 1 tablet (81 mg) by mouth daily 30 tablet 1     blood glucose (NO BRAND SPECIFIED) lancets standard Use to test blood sugar 3 times daily or as directed. 100 each 3     blood glucose (NO BRAND SPECIFIED) test strip Contour Next Test Strips-Use to test blood sugar 3 times daily or as directed. 300 strip 1     Continuous Blood Gluc Sensor (DEXCOM G7 SENSOR) MISC 1 each every 10 days Change sensor every 10 days 3 each 5     Dulaglutide 4.5 MG/0.5ML SOPN Inject 4.5 mg Subcutaneous once a week 6 mL 4     HUMALOG KWIKPEN 100 UNIT/ML soln        insulin aspart (NOVOLOG PEN) 100 UNIT/ML pen Inject 1-7 Units Subcutaneous 3 times daily (before meals) Do Not give Correction Insulin if Pre-Meal BG less than 140. For Pre-Meal  - 189 give 1 unit. For Pre-Meal  - 239 give 2 units. For Pre-Meal  - 289 give 3 units. For Pre-Meal  - 339 give 4 units. For Pre-Meal - 399 give 5 units. For Pre-Meal -449 give 6 units For Pre-Meal BG greater than or equal to 450 give 7 units. 15 mL 1     insulin glargine (LANTUS PEN) 100 UNIT/ML pen Inject 10 Units Subcutaneous every morning (before breakfast) 15 mL 0     losartan (COZAAR) 25 MG tablet Take 1 tablet (25 mg) by mouth daily 90 tablet 3     metoprolol succinate ER (TOPROL XL) 100 MG 24 hr tablet Take 1 tablet (100 mg) by mouth daily 90 tablet 4     Microlet Lancets MISC USE TO TEST BLOOD SUGAR 3 TIMES DAILY OR AS DIRECTED.       nitroGLYcerin (NITROSTAT) 0.4 MG sublingual tablet One tablet under the tongue every 5 minutes if needed for chest pain. May repeat every 5 minutes for a maximum  "of 3 doses in 15 minutes\" 25 tablet 3     oxyCODONE (ROXICODONE) 5 MG tablet Take 1 tablet (5 mg) by mouth every 6 hours as needed for moderate pain or moderate to severe pain 5 tablet 0     pantoprazole (PROTONIX) 40 MG EC tablet Take 1 tablet (40 mg) by mouth daily 90 tablet 4     predniSONE (DELTASONE) 10 MG tablet Take 1 tablet (10 mg) by mouth daily 10 tablet 0     rosuvastatin (CRESTOR) 20 MG tablet Take 1 tablet (20 mg) by mouth daily 90 tablet 4     thiamine (B-1) 100 MG tablet Take 1 tablet (100 mg) by mouth daily 30 tablet 0         ALLERGIES:  Allergies   Allergen Reactions     Seasonal Allergies          PAST MEDICAL HISTORY:  Past Medical History:   Diagnosis Date     AAA (abdominal aortic aneurysm)      BCC (basal cell carcinoma of skin) - face      CLL (chronic lymphocytic leukemia)      Diaphragmatic hernia      Diverticulitis of colon      Esophageal reflux      Essential hypertension      Hyperlipidemia      Irritable bowel syndrome      Macular degeneration (senile) of retina      Mumps      Squamous Cell Carcinoma, Back 1988     Type 2 diabetes mellitus          PAST SURGICAL HISTORY:  Past Surgical History:   Procedure Laterality Date     ANESTHESIA CARDIOVERSION N/A 8/2/2021    Procedure: ANESTHESIA, FOR CARDIOVERSION;  Surgeon: GENERIC ANESTHESIA PROVIDER;  Location: [facility] OR     APPENDECTOMY OPEN  1966     BONE MARROW BIOPSY, BONE SPECIMEN, NEEDLE/TROCAR N/A 11/21/2017    Procedure: BIOPSY BONE MARROW;  BONE MARROW BIOPSY;  Surgeon: Shady Garcia MD;  Location:  GI     COLECTOMY RIGHT Right 9/8/2023    Procedure: 1. Diagnostic laparoscopy 2. Open extended right colectomy with ileocolic anastomosis for curative intent  3. Full mobilization of the splenic flexure;  Surgeon: Vanessa Frankel MD;  Location: [facility] OR     COLONOSCOPY  2002     COLONOSCOPY N/A 7/28/2023    Procedure: COLONOSCOPY;  Surgeon: Julio Castro MD;  Location: [facility] OR     CV CORONARY ANGIOGRAM N/A " 2022    Procedure: Coronary Angiogram;  Surgeon: Evaristo Beaulieu MD;  Location: [facility] CARD[facility] CATH LAB     ENDOVASCULAR REPAIR ANEURYSM ABDOMINAL AORTA N/A 2017    Procedure: ENDOVASCULAR REPAIR ANEURYSM ABDOMINAL AORTA;  ENDOVASCULAR REPAIR ANEURYSM ABDOMINAL AORTA;  Surgeon: Milton Cazares MD;  Location: [facility] OR     Fistulotomy with marsupialization for repair of fisture in ano       IR ABDOMINAL AORTOGRAM  3/11/2019     IR VISCERAL EMBOLIZATION  2019     Multiple skin tags - resection       Squamous cell skin cancer resection - back       VASECTOMY           SOCIAL HISTORY:  Social History     Socioeconomic History     Marital status:      Spouse name: librado     Number of children: 0     Years of education: 17     Highest education level: Not on file   Occupational History     Occupation: retired   Tobacco Use     Smoking status: Former     Packs/day: 0.50     Years: 20.00     Additional pack years: 0.00     Total pack years: 10.00     Types: Cigarettes, Pipe     Quit date: 1975     Years since quittin.8     Smokeless tobacco: Former   Vaping Use     Vaping Use: Never used   Substance and Sexual Activity     Alcohol use: Yes     Alcohol/week: 10.0 - 14.0 standard drinks of alcohol     Types: 10 - 14 Standard drinks or equivalent per week     Comment: cocktail on weekend and glass of wine almost daily     Drug use: No     Sexual activity: Never   Other Topics Concern     Parent/sibling w/ CABG, MI or angioplasty before 65F 55M? Not Asked   Social History Narrative     Not on file     Social Determinants of Health     Financial Resource Strain: Low Risk  (2023)    Financial Resource Strain      Within the past 12 months, have you or your family members you live with been unable to get utilities (heat, electricity) when it was really needed?: No   Food Insecurity: Low Risk  (2023)    Food Insecurity      Within the past 12 months, did you worry that  your food would run out before you got money to buy more?: No      Within the past 12 months, did the food you bought just not last and you didn t have money to get more?: No   Transportation Needs: Low Risk  (2023)    Transportation Needs      Within the past 12 months, has lack of transportation kept you from medical appointments, getting your medicines, non-medical meetings or appointments, work, or from getting things that you need?: No   Physical Activity: Not on file   Stress: Not on file   Social Connections: Not on file   Interpersonal Safety: Not At Risk (2023)    Humiliation, Afraid, Rape, and Kick questionnaire      Fear of Current or Ex-Partner: No      Emotionally Abused: No      Physically Abused: No      Sexually Abused: No   Housing Stability: Low Risk  (2023)    Housing Stability      Do you have housing? : Yes      Are you worried about losing your housing?: No   He quit smoking in .  He drinks 1-3 glasses of wine a night with dinner.  He is retired, and used to work as a .  He lives with his wife in [place].  His cousin had lung cancer and  around age 69.  Otherwise, he denies family history of cancer.        FAMILY HISTORY:  Family History   Problem Relation Age of Onset     Cerebrovascular Disease Father         x 2     Hypertension Mother      C.A.D. Mother      Cancer Mother         skin     Eye Disorder Mother         glaucoma     Prostate Cancer No family hx of      Cancer - colorectal No family hx of      Glaucoma No family hx of      Macular Degeneration No family hx of          PHYSICAL EXAM:  /82   Pulse 96   Temp 97.4  F (36.3  C) (Oral)   Resp 16   Wt 83.2 kg (183 lb 6.4 oz)   SpO2 97%   BMI 27.08 kg/m    ECO  GENERAL/CONSTITUTIONAL: No acute distress.  GASTROINTESTINAL: Incisional scar is healing well.  EYES: No scleral icterus.  NEUROLOGIC: Alert, oriented, answers questions appropriately.  INTEGUMENTARY: No jaundice.    GAIT: Steady, does  not use assistive device      LABS:   Latest Reference Range & Units 10/03/23 14:02   Sodium 135 - 145 mmol/L 135   Potassium 3.4 - 5.3 mmol/L 4.1   Chloride 98 - 107 mmol/L 99   Carbon Dioxide (CO2) 22 - 29 mmol/L 25   Urea Nitrogen 8.0 - 23.0 mg/dL 22.7   Creatinine 0.67 - 1.17 mg/dL 1.32 (H)   GFR Estimate >60 mL/min/1.73m2 54 (L)   Calcium 8.8 - 10.2 mg/dL 9.4   Anion Gap 7 - 15 mmol/L 11   Glucose 70 - 99 mg/dL 181 (H)   WBC 4.0 - 11.0 10e3/uL 10.5   Hemoglobin 13.3 - 17.7 g/dL 9.2 (L)   Hematocrit 40.0 - 53.0 % 29.5 (L)   Platelet Count 150 - 450 10e3/uL 84 (L)   RBC Count 4.40 - 5.90 10e6/uL 3.40 (L)   MCV 78 - 100 fL 87   MCH 26.5 - 33.0 pg 27.1   MCHC 31.5 - 36.5 g/dL 31.2 (L)   RDW 10.0 - 15.0 % 19.3 (H)      Latest Reference Range & Units 08/03/23 14:13   CEA ng/mL 18.9   Ferritin 31 - 409 ng/mL 105   Iron 61 - 157 ug/dL 34 (L)   Iron Binding Capacity 240 - 430 ug/dL 328   Iron Sat Index 15 - 46 % 10 (L)   Lactate Dehydrogenase 0 - 250 U/L 193     Serum M-protein (g/dL):  9/26/23: 0.1     Total IgG (mg/dL), IgA (mg/dL), IgM (mg/dL):     Free kappa light chains (mg/dL), lambda (mg/dL), kappa/lambda ratio:      PATHOLOGY: Reviewed with patient.       Component Ref Range & Units    Case Report   Surgical Pathology Report                         Case: [id number]                                   Authorizing Provider:  Julio Castro MD Collected:           07/28/2023 02:40 PM           Ordering Location:     [facility]   Received:            07/28/2023 03:03 PM                                  Main OR                                                                       Pathologist:           Itzel Bains MD PhD                                                       Specimen:    Large Intestine, Colon, Transverse, Transverse colon mass                                  Final Diagnosis   A(1). Transverse colon mass, biopsy:  -Invasive moderately differentiated adenocarcinoma (see  comment)      Addendum electronically signed by Itzel Bains MD PhD on 8/2/2023 at  7:50 AM  Electronically signed by Itzel Bains MD PhD on 7/31/2023 at  9:52 AM   Comment     Immunohistochemical studies for MMR proteins have been requested and will be reported as an addendum when available   Synoptic Checklist   Colon and Rectum Biomarker Reporting Template   Protocol posted: 6/30/2021(Added in Addendum) COLON AND RECTUM: BIOMARKER REPORTING TEMPLATE - All Specimens  RESULTS   Mismatch Repair     Immunohistochemistry (IHC) Testing for Mismatch Repair (MMR) Proteins     MLH1 Result  Intact nuclear expression   Immunohistochemistry (IHC) Testing for Mismatch Repair (MMR) Proteins     MSH2 Result  Intact nuclear expression   Immunohistochemistry (IHC) Testing for Mismatch Repair (MMR) Proteins     MSH6 Result  Intact nuclear expression   Immunohistochemistry (IHC) Testing for Mismatch Repair (MMR) Proteins     PMS2 Result  Intact nuclear expression   Immunohistochemistry (IHC) Testing for Mismatch Repair (MMR) Proteins  Background nonneoplastic tissue / internal control with intact nuclear expression   IHC Interpretation  No loss of nuclear expression of MMR proteins: low probability of MSI-H                  Component  Ref Range & Units  Resulting Agency   Case Report   Surgical Pathology Report                         Case: [id number]                                   Authorizing Provider:  Vanessa Frankel MD  Collected:           09/08/2023 04:40 PM           Ordering Location:     [facility]   Received:            09/08/2023 06:04 PM                                  Main OR                                                                       Pathologist:           Purnima Alvarez MD                                                                           Specimen:    Large Intestine, Colon, TERMINAL  ILLEUM, RIGHT AND TRANSVERSE COLON WITH OMENTUM          Final Diagnosis   Intestine, including distal terminal ileum with right and transverse colon, extended right hemicolectomy:  -Poorly differentiated adenocarcinoma, 3.5 cm, with negative margins  -All (26) examined lymph nodes negative for metastatic carcinoma  -Tumor area appears to involve diverticula with adjacent micro abscesses and florid fibroinflammatory reaction  -Pericolonic adipose tissue with adhesions, chronic inflammation and fibrosis  -Please see synoptic tumor template within this report below      Electronically signed by Purnima Alvarez MD on 9/12/2023 at  3:09 PM   Comment  [facility]   Noted is the prior diagnosis (7/28/2023) of invasive moderately differentiated adenocarcinoma with mismatch repair protein evaluation showing intact nuclear expression.   Synoptic Checklist   COLON AND RECTUM: Resection, Including Transanal Disk Excision of Rectal Neoplasms   8th Edition - Protocol posted: 6/22/2022COLON AND RECTUM: RESECTION - All Specimens  SPECIMEN   Procedure  Extended right hemicolectomy   TUMOR   Tumor Site  Transverse colon   Histologic Type  Adenocarcinoma   Histologic Grade  G3, poorly differentiated   Tumor Size  Greatest dimension (Centimeters): 3.5 cm   Tumor Extent  Invades through muscularis propria into the pericolonic or perirectal tissue   Macroscopic Tumor Perforation  Not identified   Lymphovascular Invasion  Not identified   Perineural Invasion  Not identified   Number of Tumor Buds  22.3 per 'hotspot' field   Tumor Bud Score  High (10 or more)   Type of Polyp in which Invasive Carcinoma Arose  Tubular adenoma   Treatment Effect  No known presurgical therapy   MARGINS   Margin Status for Invasive Carcinoma  All margins negative for invasive carcinoma   Closest Margin(s) to Invasive Carcinoma  Distal   Distance from Invasive Carcinoma to Closest Margin  2.7 cm   Margin Status for Non-Invasive Tumor  All margins negative  for high-grade dysplasia / intramucosal carcinoma and low-grade dysplasia   REGIONAL LYMPH NODES   Regional Lymph Node Status  All regional lymph nodes negative for tumor   Number of Lymph Nodes Examined  26   Tumor Deposits  Not identified   PATHOLOGIC STAGE CLASSIFICATION (pTNM, AJCC 8th Edition)   Reporting of pT, pN, and (when applicable) pM categories is based on information available to the pathologist at the time the report is issued. As per the AJCC (Chapter 1, 8th Ed.) it is the managing physician's responsibility to establish the final pathologic stage based upon all pertinent information, including but potentially not limited to this pathology report.   pT Category  pT3   pN Category  pN0   ADDITIONAL FINDINGS   Additional Findings  Diverticulosis     Tumor appears to involves diverticuli with multiple areas of abscess and associated florid fibroinflammatory tissue reaction          IMAGING: Reviewed with patient.  CT CAP 8/16/23 (outside institution):  4 cm segment of irregular bowel wall thickening seen in the mid transverse colon, suspicious for colonic neoplasm.  Several prominent pericolonic lymph nodes are seen in the adjacent mesentery, raising possibility of local arun metastasis.  No bowel obstruction, free intraperitoneal air or abscess is seen.  4 mm nodular density at the right lung base peripherally, nonspecific and amenable to follow-up.  Mildly prominent right axillary lymph nodes, nonspecific and possibly reactive, amenable to follow-up.  Cholelithiasis without CT evidence for cholecystitis.  Mild bladder wall thickening, possibly related to underdistention.  Correlate clinically with urinalysis to exclude urinary tract infection or inflammation.    CT A/P 9/14/23:  IMPRESSION:   1.  Interval right hemicolectomy with expected pneumoperitoneum.  2.  Dilated fluid-filled small bowel loops compatible with ileus.  3.  Severe distal colonic diverticulosis.  4.  Trace left effusion and mild  bibasilar atelectasis.  5.  Prior abdominal aortic aneurysm repair.    CT CAP 9/28/23:  IMPRESSION:  1.  No evidence for pulmonary embolism, acute thoracic aortic  abnormality, or acute airspace disease.  2.  Mildly enlarged ascending thoracic aorta measures smaller than on  9/26/2021.        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  -CT CAP done 8/2023 at [facility] without evidence of metastatic disease.  -CEA 18.9 upon diagnosis.   -On 8/3/23, ferritin 105, iron 34, TIBC 328, Iron sat 10%. 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 S/p venofer 300 mg x3.  -Diagnostic lap 9/8/23 with open extended right colectomy with ileocolic anastomosis and full mobilization of the splenic flexure: transverse colon adenocarcinoma, grade 3, no perforation/LVI/PNI, high tumor bud score, all  margins negative, 0/26 lymph nodes.  -We discussed high risk features in the stage II setting to include high grade and high tumor bud score. We discussed the possibility of adjuvant treatment with capecitabine x6 months or fluorouracil/leucovorin x 6 months vs observation. We discussed the risk of treatment and given his age and comorbidities, patient has elected for observation to which I am in agreement. We will follow in surveillance.    -Surveillance:   -H&P with CBC, CMP, CEA every 3-6 months for 2 years, then every 6 months for a total of 5 years.   -CT CAP every 6-12 months for a total of 5 years.   -Colonoscopy will be due in 1 year.    2) CLL/SLL: BLACKWOOD stage III, with lymphocytosis, lymphadenopathy, splenomegaly, and anemia.  He has mild thrombocytopenia as well, but is above 100K.    -He presented with leukocytosis with WBC of 61.7K, hemoglobin of 10.4,  and platelet count of 115K on diagnosis.  Bone marrow biopsy and flow cytometry confirmed CLL/SLL.  CT scan shows mildly enlarged left axillary lymph node and periaortic lymph nodes in the retroperitoneum of the abdomen, with enlarged spleen.    -Due to worsening lymphocyte count, anemia, thrombocytopenia, as well as fatigue and night sweats, he started ibrutinib on 5/4/20.    -In light of his recent issues with atrial fibrillation and now on Eliquis, his ibrutinib has been on hold since August 2021.  Considering his CLL is under good control, his WBC is actually on the lower end of normal, and with risk of atrial fibrillation and bleeding on anticoagulants, we will continue to hold ibrutinib for now and monitor his counts. He has persistent thrombocytopenia. He will likely need to resume treatment at some point, but we can follow the counts and we could also consider other treatments as well for CLL.  -Patient's thrombocytopenia has improved (>100K since June 2023). WBCs are ranging 13-14, ALC 8-9%. Will continue to monitor.    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 He completed radiation at [facility] with planned 60 Gy x 30 fractions. He has completed radiation and noted that he did not need any more follow-up for this.  -He continues to follow with dermatology and just had removal of basal cell carcinoma 9/30/23.    3) Abdominal aortic aneurysm: measures up to 7.6 cm on CT scan, incidentally found.  He underwent EVAR on 12/4/17.  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  He is on Plavix now.  On 5/13/19, he underwent and percutaneous aneurysm sac puncture and endo leak embolization by IR on 5/13/19.  -Follow-up with vascular surgery and IR.    4) Diabetes, hypertension:    -Following with PCP.    5) Atrial fibrillation with RVR s/p successful DCCV to SR:  -On metoprolol. He is off eliquis.  -Following  with cardiology.    6) CAD:   -Follow-up with cardiology  -There are continued discussions in regards to PCI/CABG. He is uncertain if he is following with [facility] or locally.  -Plavix is currently held per hospital discharge. He is on aspirin 81 mg daily.    7) Dysuria  -He requests to check UA.    8) MGUS  -Recheck labs in 6 months.     9) -Check CBC, CMP, CEA, LDH, iron studies, folate, B12 in 3 months.  -Check CT CAP in 3 months.  -Follow up in clinic in 3 months with ENRIQUE.  -Patient requests UA. He is unable to provide a sample in clinic today. He has been provided with supplies and instructed where to drop off over the weekend.      Miriam Badillo DO  Hematology/Oncology  [facility]      Again, thank you for allowing me to participate in the care of your patient.        Sincerely,        Miriam Badillo DO

## 2023-10-20 NOTE — PROGRESS NOTES
[facility]    Hematology/Oncology Established Patient Note      Today's Date: 10/20/23    Reason for Follow-up: CLL      HISTORY OF PRESENT ILLNESS: Austin Garza is an 83 year old male with CLL previously on ibrutinib and followed by Dr. Perry.    He has PMHx of DMII, HTN who presented with leukocytosis.  In November 2017, he was found to have elevated WBC of 61.7K, hemoglobin of 10.4, and platelet of 115K.  There were no other CBC results available between 2010 and 2017.  Prior to 2010, he had normal CBC, with normal to mild anemia, and mild thrombocytopenia.   He was asymptomatic.    He underwent bone marrow biopsy on 11/21/17, which was consistent with chronic lymphocytic leukemia/small lymphocytic lymphoma, with 13% ringed sideroblasts identified.  The presence of increased numbers of ringed sideroblasts raises the possibility of an associated myelodysplastic syndrome.  Dysplastic changes are not identified though.  Many cases exhibiting ringed sideroblasts are metabolic origin, without significant risk of progression to acute leukemia, and these typically do not show dysplastic morphology as is the case with this specimen.  15% ringed sideroblasts are required for a diagnosis for RARS, which this specimen does not meet.  Flow cytometry is also consistent with CLL/SLL.  Cytogenetics show two (10%) of the metaphase cells comprise a clone characterized by a deletion within the proximal long arm of a chromosome 13 as the sole karyotypic abnormality.  Three (15%) metaphases had loss of the Y chromosome as the sole abnormality.    CT scan on 11/29/17 shows mildly enlarged left axillary lymph node and periaortic lymph nodes in the retroperitoneum of the abdomen.  Spleen is also enlarged.    On his staging CT scan for CLL, he was incidentally found to have a large infrarenal abdominal aortic aneurysm, measuring 7.6 cm.  He was seen by Dr. Thomas, and he underwent EVAR on 12/4/17.     He  started ibrutinib on 5/4/20.  It was held 5/28/20-6/4/20 for tooth extraction.    He was hospitalized on 7/30/2021 for shortness of breath, new CHF, A. fib with RVR and ibrutinib was put on hold.  He was seen by cardiology and eventually underwent cardioversion for the A. fib.  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  He was started on Eliquis and metoprolol.  His Plavix was stopped.    He went to the [facility] ED on 9/26/21 and found to have pericardial effusion.  Due to lack of beds, he was transferred to [facility] for acute pericardial effusion and pericarditis.  He was started on colchicine and prednisone taper x 6 weeks.  He was also given Bactrim for PJP prophylaxis due duration of prednisone course.  He was discharged on 10/1/21.      Since his last visit in clinic, he states he has had multiple hospitalizations for back pain. He informs me he has cardiology follow up as he was discovered to have coronary artery disease. He has had angiogram. There are recommendations in regards to CABG vs PCI. He states he is uncertain if he will move forward with this. He will be seen in 11/3/22.      INTERIM HISTORY:   Patient was hospitalized for acute blood loss anemia in the setting of GIB. He underwent colonoscopy on 7/28 with path returning as colonic mass with adherent clot in the transverse colon, which returned positive for adenocarcinoma. He had CT CAP done yesterday at [facility].     On 9/8/23, he underwent diagnostic laparoscopy with open extended right colectomy with ileocolic anastomosis, full mobilization of the splenic flexure (Dr. Frankel). He had prolonge dileus requiring NGT. He was started on TPN. He was discharged from the hospital 9/20/23.    He states he has had dysuria since he returned home. Otherwise, he has healed well.       REVIEW OF SYSTEMS:   14 point ROS was reviewed and is negative other than as noted above in HPI.       HOME MEDICATIONS:  Current Outpatient  "Medications   Medication Sig Dispense Refill    Alcohol Swabs (ALCOHOL PREP) 70 % PADS       aspirin 81 MG EC tablet Take 1 tablet (81 mg) by mouth daily 30 tablet 1    blood glucose (NO BRAND SPECIFIED) lancets standard Use to test blood sugar 3 times daily or as directed. 100 each 3    blood glucose (NO BRAND SPECIFIED) test strip Contour Next Test Strips-Use to test blood sugar 3 times daily or as directed. 300 strip 1    Continuous Blood Gluc Sensor (DEXCOM G7 SENSOR) MISC 1 each every 10 days Change sensor every 10 days 3 each 5    Dulaglutide 4.5 MG/0.5ML SOPN Inject 4.5 mg Subcutaneous once a week 6 mL 4    HUMALOG KWIKPEN 100 UNIT/ML soln       insulin aspart (NOVOLOG PEN) 100 UNIT/ML pen Inject 1-7 Units Subcutaneous 3 times daily (before meals) Do Not give Correction Insulin if Pre-Meal BG less than 140. For Pre-Meal  - 189 give 1 unit. For Pre-Meal  - 239 give 2 units. For Pre-Meal  - 289 give 3 units. For Pre-Meal  - 339 give 4 units. For Pre-Meal - 399 give 5 units. For Pre-Meal -449 give 6 units For Pre-Meal BG greater than or equal to 450 give 7 units. 15 mL 1    insulin glargine (LANTUS PEN) 100 UNIT/ML pen Inject 10 Units Subcutaneous every morning (before breakfast) 15 mL 0    losartan (COZAAR) 25 MG tablet Take 1 tablet (25 mg) by mouth daily 90 tablet 3    metoprolol succinate ER (TOPROL XL) 100 MG 24 hr tablet Take 1 tablet (100 mg) by mouth daily 90 tablet 4    Microlet Lancets MISC USE TO TEST BLOOD SUGAR 3 TIMES DAILY OR AS DIRECTED.      nitroGLYcerin (NITROSTAT) 0.4 MG sublingual tablet One tablet under the tongue every 5 minutes if needed for chest pain. May repeat every 5 minutes for a maximum of 3 doses in 15 minutes\" 25 tablet 3    oxyCODONE (ROXICODONE) 5 MG tablet Take 1 tablet (5 mg) by mouth every 6 hours as needed for moderate pain or moderate to severe pain 5 tablet 0    pantoprazole (PROTONIX) 40 MG EC tablet Take 1 tablet (40 mg) by mouth " daily 90 tablet 4    predniSONE (DELTASONE) 10 MG tablet Take 1 tablet (10 mg) by mouth daily 10 tablet 0    rosuvastatin (CRESTOR) 20 MG tablet Take 1 tablet (20 mg) by mouth daily 90 tablet 4    thiamine (B-1) 100 MG tablet Take 1 tablet (100 mg) by mouth daily 30 tablet 0         ALLERGIES:  Allergies   Allergen Reactions    Seasonal Allergies          PAST MEDICAL HISTORY:  Past Medical History:   Diagnosis Date    AAA (abdominal aortic aneurysm)     BCC (basal cell carcinoma of skin) - face     CLL (chronic lymphocytic leukemia)     Diaphragmatic hernia     Diverticulitis of colon     Esophageal reflux     Essential hypertension     Hyperlipidemia     Irritable bowel syndrome     Macular degeneration (senile) of retina     Mumps     Squamous Cell Carcinoma, Back 1988    Type 2 diabetes mellitus          PAST SURGICAL HISTORY:  Past Surgical History:   Procedure Laterality Date    ANESTHESIA CARDIOVERSION N/A 8/2/2021    Procedure: ANESTHESIA, FOR CARDIOVERSION;  Surgeon: GENERIC ANESTHESIA PROVIDER;  Location: [facility] OR    APPENDECTOMY OPEN  1966    BONE MARROW BIOPSY, BONE SPECIMEN, NEEDLE/TROCAR N/A 11/21/2017    Procedure: BIOPSY BONE MARROW;  BONE MARROW BIOPSY;  Surgeon: Shady Garcia MD;  Location: [facility]    COLECTOMY RIGHT Right 9/8/2023    Procedure: 1. Diagnostic laparoscopy 2. Open extended right colectomy with ileocolic anastomosis for curative intent  3. Full mobilization of the splenic flexure;  Surgeon: Vanessa Frankel MD;  Location: [facility] OR    COLONOSCOPY  2002    COLONOSCOPY N/A 7/28/2023    Procedure: COLONOSCOPY;  Surgeon: Julio Castro MD;  Location: [facility] OR    CV CORONARY ANGIOGRAM N/A 6/29/2022    Procedure: Coronary Angiogram;  Surgeon: Evaristo Beaulieu MD;  Location:  [facility]    ENDOVASCULAR REPAIR ANEURYSM ABDOMINAL AORTA N/A 12/4/2017    Procedure: ENDOVASCULAR REPAIR ANEURYSM ABDOMINAL AORTA;  ENDOVASCULAR REPAIR ANEURYSM ABDOMINAL AORTA;  Surgeon:  Milton Cazares MD;  Location: [facility] OR    Fistulotomy with marsupialization for repair of fisture in ano      IR ABDOMINAL AORTOGRAM  3/11/2019    IR VISCERAL EMBOLIZATION  2019    Multiple skin tags - resection  1998    Squamous cell skin cancer resection - back      VASECTOMY           SOCIAL HISTORY:  Social History     Socioeconomic History    Marital status:      Spouse name: librado    Number of children: 0    Years of education: 17    Highest education level: Not on file   Occupational History    Occupation: retired   Tobacco Use    Smoking status: Former     Packs/day: 0.50     Years: 20.00     Additional pack years: 0.00     Total pack years: 10.00     Types: Cigarettes, Pipe     Quit date: 1975     Years since quittin.8    Smokeless tobacco: Former   Vaping Use    Vaping Use: Never used   Substance and Sexual Activity    Alcohol use: Yes     Alcohol/week: 10.0 - 14.0 standard drinks of alcohol     Types: 10 - 14 Standard drinks or equivalent per week     Comment: cocktail on weekend and glass of wine almost daily    Drug use: No    Sexual activity: Never   Other Topics Concern    Parent/sibling w/ CABG, MI or angioplasty before 65F 55M? Not Asked   Social History Narrative    Not on file     Social Determinants of Health     Financial Resource Strain: Low Risk  (2023)    Financial Resource Strain     Within the past 12 months, have you or your family members you live with been unable to get utilities (heat, electricity) when it was really needed?: No   Food Insecurity: Low Risk  (2023)    Food Insecurity     Within the past 12 months, did you worry that your food would run out before you got money to buy more?: No     Within the past 12 months, did the food you bought just not last and you didn t have money to get more?: No   Transportation Needs: Low Risk  (2023)    Transportation Needs     Within the past 12 months, has lack of transportation kept you from  medical appointments, getting your medicines, non-medical meetings or appointments, work, or from getting things that you need?: No   Physical Activity: Not on file   Stress: Not on file   Social Connections: Not on file   Interpersonal Safety: Not At Risk (2023)    Humiliation, Afraid, Rape, and Kick questionnaire     Fear of Current or Ex-Partner: No     Emotionally Abused: No     Physically Abused: No     Sexually Abused: No   Housing Stability: Low Risk  (2023)    Housing Stability     Do you have housing? : Yes     Are you worried about losing your housing?: No   He quit smoking in .  He drinks 1-3 glasses of wine a night with dinner.  He is retired, and used to work as a .  He lives with his wife in [place].  His cousin had lung cancer and  around age 69.  Otherwise, he denies family history of cancer.        FAMILY HISTORY:  Family History   Problem Relation Age of Onset    Cerebrovascular Disease Father         x 2    Hypertension Mother     C.A.D. Mother     Cancer Mother         skin    Eye Disorder Mother         glaucoma    Prostate Cancer No family hx of     Cancer - colorectal No family hx of     Glaucoma No family hx of     Macular Degeneration No family hx of          PHYSICAL EXAM:  /82   Pulse 96   Temp 97.4  F (36.3  C) (Oral)   Resp 16   Wt 83.2 kg (183 lb 6.4 oz)   SpO2 97%   BMI 27.08 kg/m    ECO  GENERAL/CONSTITUTIONAL: No acute distress.  GASTROINTESTINAL: Incisional scar is healing well.  EYES: No scleral icterus.  NEUROLOGIC: Alert, oriented, answers questions appropriately.  INTEGUMENTARY: No jaundice.    GAIT: Steady, does not use assistive device      LABS:   Latest Reference Range & Units 10/20/23 14:52   Sodium 135 - 145 mmol/L 135   Potassium 3.4 - 5.3 mmol/L 4.7   Chloride 98 - 107 mmol/L 101   Carbon Dioxide (CO2) 22 - 29 mmol/L 26   Urea Nitrogen 8.0 - 23.0 mg/dL 21.0   Creatinine 0.67 - 1.17 mg/dL 1.20 (H)   GFR Estimate >60 mL/min/1.73m2 61    Calcium 8.8 - 10.2 mg/dL 8.6 (L)   Anion Gap 7 - 15 mmol/L 8   Albumin 3.5 - 5.2 g/dL 3.6   Protein Total 6.4 - 8.3 g/dL 6.4   Alkaline Phosphatase 40 - 129 U/L 73   ALT 0 - 70 U/L 32   AST 0 - 45 U/L 17   Bilirubin Total <=1.2 mg/dL 0.7   Glucose 70 - 99 mg/dL 255 (H)   Lactate Dehydrogenase 0 - 250 U/L 179   WBC 4.0 - 11.0 10e3/uL 12.1 (H) (P)   Hemoglobin 13.3 - 17.7 g/dL 9.7 (L) (P)   Hematocrit 40.0 - 53.0 % 31.2 (L) (P)   Platelet Count 150 - 450 10e3/uL 77 (L) (P)   RBC Count 4.40 - 5.90 10e6/uL 3.32 (L) (P)   MCV 78 - 100 fL 94 (P)   MCH 26.5 - 33.0 pg 29.2 (P)   MCHC 31.5 - 36.5 g/dL 31.1 (L) (P)   RDW 10.0 - 15.0 % 23.1 (H) (P)      Latest Reference Range & Units 08/03/23 14:13   CEA ng/mL 18.9   Ferritin 31 - 409 ng/mL 105   Iron 61 - 157 ug/dL 34 (L)   Iron Binding Capacity 240 - 430 ug/dL 328   Iron Sat Index 15 - 46 % 10 (L)   Lactate Dehydrogenase 0 - 250 U/L 193     Serum M-protein (g/dL):  9/26/23: 0.1     Total IgG (mg/dL), IgA (mg/dL), IgM (mg/dL):     Free kappa light chains (mg/dL), lambda (mg/dL), kappa/lambda ratio:      PATHOLOGY: Reviewed with patient.       Component Ref Range & Units    Case Report   Surgical Pathology Report                         Case: [id number]                                   Authorizing Provider:  Julio Castro MD Collected:           07/28/2023 02:40 PM           Ordering Location:     [facility]   Received:            07/28/2023 03:03 PM                                  Main OR                                                                       Pathologist:           Itzel Bains MD PhD                                                       Specimen:    Large Intestine, Colon, Transverse, Transverse colon mass                                  Final Diagnosis   A(1). Transverse colon mass, biopsy:  -Invasive moderately differentiated adenocarcinoma (see comment)      Addendum electronically signed by Itzel Bains MD PhD on  8/2/2023 at  7:50 AM  Electronically signed by Itzel Bains MD PhD on 7/31/2023 at  9:52 AM   Comment     Immunohistochemical studies for MMR proteins have been requested and will be reported as an addendum when available   Synoptic Checklist   Colon and Rectum Biomarker Reporting Template   Protocol posted: 6/30/2021(Added in Addendum) COLON AND RECTUM: BIOMARKER REPORTING TEMPLATE - All Specimens  RESULTS   Mismatch Repair     Immunohistochemistry (IHC) Testing for Mismatch Repair (MMR) Proteins     MLH1 Result  Intact nuclear expression   Immunohistochemistry (IHC) Testing for Mismatch Repair (MMR) Proteins     MSH2 Result  Intact nuclear expression   Immunohistochemistry (IHC) Testing for Mismatch Repair (MMR) Proteins     MSH6 Result  Intact nuclear expression   Immunohistochemistry (IHC) Testing for Mismatch Repair (MMR) Proteins     PMS2 Result  Intact nuclear expression   Immunohistochemistry (IHC) Testing for Mismatch Repair (MMR) Proteins  Background nonneoplastic tissue / internal control with intact nuclear expression   IHC Interpretation  No loss of nuclear expression of MMR proteins: low probability of MSI-H                  Component  Ref Range & Units  Resulting Agency   Case Report   Surgical Pathology Report                         Case: [id number]                                   Authorizing Provider:  Vanessa Frankel MD  Collected:           09/08/2023 04:40 PM           Ordering Location:     [facility]   Received:            09/08/2023 06:04 PM                                  Main OR                                                                       Pathologist:           Purnima Alvarze MD                                                                           Specimen:    Large Intestine, Colon, TERMINAL ILLEUM, RIGHT AND TRANSVERSE COLON WITH OMENTUM          Final Diagnosis    Intestine, including distal terminal ileum with right and transverse colon, extended right hemicolectomy:  -Poorly differentiated adenocarcinoma, 3.5 cm, with negative margins  -All (26) examined lymph nodes negative for metastatic carcinoma  -Tumor area appears to involve diverticula with adjacent micro abscesses and florid fibroinflammatory reaction  -Pericolonic adipose tissue with adhesions, chronic inflammation and fibrosis  -Please see synoptic tumor template within this report below      Electronically signed by Purnima Alvarez MD on 9/12/2023 at  3:09 PM   Comment  [facility]   Noted is the prior diagnosis (7/28/2023) of invasive moderately differentiated adenocarcinoma with mismatch repair protein evaluation showing intact nuclear expression.   Synoptic Checklist   COLON AND RECTUM: Resection, Including Transanal Disk Excision of Rectal Neoplasms   8th Edition - Protocol posted: 6/22/2022COLON AND RECTUM: RESECTION - All Specimens  SPECIMEN   Procedure  Extended right hemicolectomy   TUMOR   Tumor Site  Transverse colon   Histologic Type  Adenocarcinoma   Histologic Grade  G3, poorly differentiated   Tumor Size  Greatest dimension (Centimeters): 3.5 cm   Tumor Extent  Invades through muscularis propria into the pericolonic or perirectal tissue   Macroscopic Tumor Perforation  Not identified   Lymphovascular Invasion  Not identified   Perineural Invasion  Not identified   Number of Tumor Buds  22.3 per 'hotspot' field   Tumor Bud Score  High (10 or more)   Type of Polyp in which Invasive Carcinoma Arose  Tubular adenoma   Treatment Effect  No known presurgical therapy   MARGINS   Margin Status for Invasive Carcinoma  All margins negative for invasive carcinoma   Closest Margin(s) to Invasive Carcinoma  Distal   Distance from Invasive Carcinoma to Closest Margin  2.7 cm   Margin Status for Non-Invasive Tumor  All margins negative for high-grade dysplasia / intramucosal carcinoma and low-grade  dysplasia   REGIONAL LYMPH NODES   Regional Lymph Node Status  All regional lymph nodes negative for tumor   Number of Lymph Nodes Examined  26   Tumor Deposits  Not identified   PATHOLOGIC STAGE CLASSIFICATION (pTNM, AJCC 8th Edition)   Reporting of pT, pN, and (when applicable) pM categories is based on information available to the pathologist at the time the report is issued. As per the AJCC (Chapter 1, 8th Ed.) it is the managing physician's responsibility to establish the final pathologic stage based upon all pertinent information, including but potentially not limited to this pathology report.   pT Category  pT3   pN Category  pN0   ADDITIONAL FINDINGS   Additional Findings  Diverticulosis     Tumor appears to involves diverticuli with multiple areas of abscess and associated florid fibroinflammatory tissue reaction          IMAGING: Reviewed with patient.  CT CAP 8/16/23 (outside institution):  4 cm segment of irregular bowel wall thickening seen in the mid transverse colon, suspicious for colonic neoplasm.  Several prominent pericolonic lymph nodes are seen in the adjacent mesentery, raising possibility of local arun metastasis.  No bowel obstruction, free intraperitoneal air or abscess is seen.  4 mm nodular density at the right lung base peripherally, nonspecific and amenable to follow-up.  Mildly prominent right axillary lymph nodes, nonspecific and possibly reactive, amenable to follow-up.  Cholelithiasis without CT evidence for cholecystitis.  Mild bladder wall thickening, possibly related to underdistention.  Correlate clinically with urinalysis to exclude urinary tract infection or inflammation.    CT A/P 9/14/23:  IMPRESSION:   1.  Interval right hemicolectomy with expected pneumoperitoneum.  2.  Dilated fluid-filled small bowel loops compatible with ileus.  3.  Severe distal colonic diverticulosis.  4.  Trace left effusion and mild bibasilar atelectasis.  5.  Prior abdominal aortic aneurysm  repair.    CT CAP 9/28/23:  IMPRESSION:  1.  No evidence for pulmonary embolism, acute thoracic aortic  abnormality, or acute airspace disease.  2.  Mildly enlarged ascending thoracic aorta measures smaller than on  9/26/2021.        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  -CT CAP done 8/2023 at [facility] without evidence of metastatic disease.  -CEA 18.9 upon diagnosis.   -On 8/3/23, ferritin 105, iron 34, TIBC 328, Iron sat 10%. 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 S/p venofer 300 mg x3.  -Diagnostic lap 9/8/23 with open extended right colectomy with ileocolic anastomosis and full mobilization of the splenic flexure: transverse colon adenocarcinoma, grade 3, no perforation/LVI/PNI, high tumor bud score, all  margins negative, 0/26 lymph nodes.  -We discussed high risk features in the stage II setting to include high grade and high tumor bud score. We discussed the possibility of adjuvant treatment with capecitabine x6 months or fluorouracil/leucovorin x 6 months vs observation. We discussed the risk of treatment and given his age and comorbidities, patient has elected for observation to which I am in agreement. We will follow in surveillance.    -Surveillance:   -H&P with CBC, CMP, CEA every 3-6 months for 2 years, then every 6 months for a total of 5 years.   -CT CAP every 6-12 months for a total of 5 years.   -Colonoscopy will be due in 1 year (9/2024).    2) CLL/SLL: BLACKWOOD stage III, with lymphocytosis, lymphadenopathy, splenomegaly, and anemia.  He has mild thrombocytopenia as well, but is above 100K.    -He presented with leukocytosis with WBC of 61.7K, hemoglobin of 10.4, and platelet count of 115K on diagnosis.  Bone marrow  biopsy and flow cytometry confirmed CLL/SLL.  CT scan shows mildly enlarged left axillary lymph node and periaortic lymph nodes in the retroperitoneum of the abdomen, with enlarged spleen.    -Due to worsening lymphocyte count, anemia, thrombocytopenia, as well as fatigue and night sweats, he started ibrutinib on 5/4/20.    -In light of his recent issues with atrial fibrillation and now on Eliquis, his ibrutinib has been on hold since August 2021.  Considering his CLL is under good control, his WBC is actually on the lower end of normal, and with risk of atrial fibrillation and bleeding on anticoagulants, we will continue to hold ibrutinib for now and monitor his counts. He has persistent thrombocytopenia. He will likely need to resume treatment at some point, but we can follow the counts and we could also consider other treatments as well for CLL.  -Patient's thrombocytopenia has improved (>100K since June 2023). WBCs are ranging 13-14, ALC 8-9%. Will continue to monitor.    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 He completed radiation at [facility] with planned 60 Gy x 30 fractions. He has completed radiation and noted that he did not need any more follow-up for this.  -He continues to follow with dermatology and just had removal of basal cell carcinoma 9/30/23.    3) Abdominal aortic aneurysm: measures up to 7.6 cm on CT scan, incidentally found.  He underwent EVAR on 12/4/17.  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  He is on Plavix now.  On 5/13/19, he underwent and percutaneous aneurysm sac puncture and endo leak embolization by IR on 5/13/19.  -Follow-up with vascular surgery and IR.    4) Diabetes, hypertension:    -Following with PCP.    5) Atrial fibrillation with RVR s/p successful DCCV to SR:  -On metoprolol. He is off eliquis.  -Following with cardiology.    6) CAD:   -Follow-up with  cardiology  -There are continued discussions in regards to PCI/CABG. He is uncertain if he is following with [place] or locally.  -Plavix is currently held per hospital discharge. He is on aspirin 81 mg daily.    7) Dysuria  -He requests to check UA.    8) MGUS  -Recheck labs in 6 months.     9) -Check CBC, CMP, CEA, LDH, iron studies, folate, B12 in 3 months.  -Check CT CAP in 3 months.  -Follow up in clinic in 3 months with ENRIQUE.  -Patient requests UA. He is unable to provide a sample in clinic today. He has been provided with supplies and instructed where to drop off over the weekend.      Miriam Badillo, DO  Hematology/Oncology  [facility]

## 2023-10-20 NOTE — PROGRESS NOTES
Medical Assistant Note:  Austin Garza presents today for blood draw.    Patient seen by provider today: Yes: Justino.   present during visit today: Not Applicable.    Concerns: No Concerns.    Procedure:  Lab draw site: right antecub, Needle type: butterfly, Gauge: 23.    Post Assessment:  Labs drawn without difficulty: Yes.    Discharge Plan:  Departure Mode: Ambulatory.    Face to Face Time: 10.    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 Hx obtained from daughter. She noticed breathing sounds like he was snoring and called EMS.  No sick contact. No diarrhea.     In the ED, VSS. Labs significant for Na 110, CO2 14, lactate 2.5. CXR w/ b/l patchy opacities R>L c/w PNA. CT head w/ multiple foci of right frontal intraparenchymal hemorrhage with surrounding extensive vasogenic edema and effacement of the right lateral ventricle frontal horn, c/f rupture bleeding cavernoma vs malignancy. MICU consulted for hyponatremia. (30 Sep 2021 01:07)      Imaging: CTH R sided significant edema with some hyperdensity and shift, c/f mass vs venous infarct. Exam before intubation: GHULAM, pupils 2 sluggish, UE flex, BLE TF, snoring.       --Anticoagulation:    =====================  PAST MEDICAL HISTORY   Spinal stenosis    Diabetes    Benign essential HTN    Gallstones      PAST SURGICAL HISTORY   S/P cataract surgery          MEDICATIONS:  Antibiotics:  piperacillin/tazobactam IVPB.. 3.375 Gram(s) IV Intermittent every 8 hours    Neuro:  fentaNYL   Infusion... 0.5 MICROgram(s)/kG/Hr IV Continuous <Continuous>  levETIRAcetam  IVPB 500 milliGRAM(s) IV Intermittent every 12 hours  propofol Infusion 10 MICROgram(s)/kG/Min IV Continuous <Continuous>    Other:  sodium chloride 3%. 500 milliLiter(s) IV Continuous <Continuous>      SOCIAL HISTORY:   Occupation:   Marital Status:     FAMILY HISTORY:      ROS: Negative except per HPI    Exam: Intubated, GHULAM, UE minimal movement, BLE trace withdrawal

## 2023-10-21 NOTE — LETTER
[facility] [address]  [place] [address]  Phone: [phone]  Fax: [phone]      2019       Re: Austin CHEEMA Greg - 1940     Austin is here for his 1 year visit with no acute issues.  His CT angiogram shows still a persistent type II endoleak that is probably from the YOHAN or a lumbar artery source as well.  The aneurysm sac appears slightly larger on measurement today as previously it was stable.  It measures 7.6 cm from 7.3 cm 6 months prior.  He is otherwise asymptomatic and no pain at all.     Physical Examination:  /83 (BP Location: Right arm, Patient Position: Chair, Cuff Size: Adult Regular)   Pulse 108      Constitutional:  NAD, alert and appropriate, well developed male.  HEENT: PERRLA, mucous membranes moist.  Abd: soft, NT, no masses and non-pulsatile.  Ext:  Warm and well perfused.  Motor intact and sensory.      Assessment: 78 yo male s/p EVAR 2017 with a delayed Type II endoleak.      Plan:  I plan to set up Austin for an abdominal aortogram with assessment of the location of the type II endoleak and possible embolization.  I've discussed with him that I'm concerned the aneurysm sac is now growing and we do need to assess the source of the leak and consider treatment at that time.  He is leaving for 1 month and we will schedule it for a date upon his return to [place].  He continues to be asymptomatic and I've asked him to call if he develops significant discomfort in his low back/abdomen.   I've spent 15 minutes of face-to-face time, > 50% spent counseling and coordinating care.      Milton Cazares MD  Vascular Surgery       Negative

## 2023-10-23 NOTE — PROGRESS NOTES
Miriam Badillo DO [place], Kayla, SHAMIR  Hi Letty    Benji's UA+ culture are positive for staph aureus. I am sending a script for macrobid to his pharmacy x7 days.    If he continues to have UTI symptoms following the 7 day period, please have him follow up with PCP.    Writer spoke to Benji about UA result and antibiotic recommendations from Dr. Badillo. He states that he has already received a call from [facility] stating that the medication is ready for pickup. He is in agreement with plan of care and will reach out to PCP if he has continued symptoms.     Letty Arevalo, RN on 10/23/2023 at 9:47 AM

## 2023-10-30 NOTE — TELEPHONE ENCOUNTER
Called [facility] at [phone] & provided the verbal order.    SHAMIR Jauregui  Patient Advocate Liason (PAL)  [facility]  Ph. [phone] / Fax. [phone]

## 2023-10-30 NOTE — TELEPHONE ENCOUNTER
Ok to give verbal order.  Luma Kauffman MD  Internal Medicine & Pediatrics  [facility]  She/her

## 2023-10-30 NOTE — TELEPHONE ENCOUNTER
Home Care is calling regarding an established patient with [facility].       Requesting orders from: Luma Kauffman  Provider is following patient: Yes  Is this a 60-day recertification request?  No    Orders Requested    Dietary Consult/Visit    1 time/per month for the month of November.    Information was gathered and will be sent to provider for review.  RN will contact Home Care with information after provider review.  Confirmed ok to leave a detailed message with call back.  Contact information confirmed and updated as needed.    Gail BARKSDALE with [employer]  [phone]  OK to [facility]    Mele Hernandez RN on 10/30/2023 at 11:12 AM

## 2023-11-03 NOTE — TELEPHONE ENCOUNTER
Ok to give verbal orders.    Luma Kauffman MD  Internal Medicine & Pediatrics  [facility]  She/her

## 2023-11-03 NOTE — TELEPHONE ENCOUNTER
"Miriam from [facility] called requesting \"No visit discharge\" orders. Miriam stated that whenever they attempt to see pt, both patient and/or wife decline the visit stating they don't want or need it.    Miriam can be reached at [phone]    Ita Oneal on 11/3/2023 at 11:47 AM    "

## 2023-11-03 NOTE — TELEPHONE ENCOUNTER
Called Miriam back & LM providing 's recommendation.     Shania RN  Patient Advocate Liason (PAL)  [facility]  Ph. [phone] / Fax. [phone]

## 2023-11-29 NOTE — PROGRESS NOTES
Panel Management:     I have not been able to connect with patient after multiple attempts.  No further attempts will be made at this time.  I would be happy to assist in the care of this patient in the future if needed.    Klaudia Santa, PharmD  Medication Therapy Management Pharmacist

## 2023-12-11 NOTE — PATIENT INSTRUCTIONS
Try Flonase (fluticasone) nasal spray 1 puff in each nostril daily. For the first week you could 2 puffs per nostril  If not helping try adding Zyrtec (cetirizine) 10mg     Start taking metformin 1 pill at night with dinner for a week  Then starting taking 2 pills in the evening with a meal     Additional insulin for high blood sugars

## 2023-12-11 NOTE — PROGRESS NOTES
"  Assessment & Plan     Type 2 diabetes mellitus with other specified complication, with long-term current use of insulin (H)  Is tapering off prednisone so may be able to come off insulin. Recommend add metformin - unclear why stopped in past. Also added sliding scale insulin for high values but anticipate with taper off pred and add metformin may decrease insulin need. Recommend keep follow up appointment with me in Feb.  - metFORMIN (GLUCOPHAGE XR) 500 MG 24 hr tablet; Take 2 tablets (1,000 mg) by mouth daily (with dinner)  - Hemoglobin A1c; Future    Postnasal drip  Recommend trial Flonase (fluticasone) and Zyrtec (cetirizine).    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    Atherosclerosis of native coronary artery of native heart without angina pectoris  Beta blocker may be contributing to what seems like orthostatic lightheadedness in morning. Continue to monitor.             BMI:   Estimated body mass index is 27.9 kg/m  as calculated from the following:    Height as of 10/2/23: 1.753 m (5' 9\").    Weight as of this encounter: 85.7 kg (188 lb 14.4 oz).           Luam Kauffman MD  [facility] CYRUS Leblanc is a 83 year old, presenting for the following health issues:  Diabetes, Recheck Medication, and History of Present Illness        12/11/2023     1:14 PM   Additional Questions   Roomed by S   Accompanied by self         12/11/2023     1:14 PM   Patient Reported Additional Medications   Patient reports taking the following new medications none       History of Present Illness       Reason for visit:  Several    He eats 2-3 servings of fruits and vegetables daily.He consumes 0 sweetened beverage(s) daily.He exercises with enough effort to increase his heart rate 9 or less minutes per day.  He exercises with enough effort to " "increase his heart rate 3 or less days per week.   He is taking medications regularly.           Diabetes Follow-up    How often are you checking your blood sugar? Continuous glucose monitor  What time of day are you checking your blood sugars (select all that apply)?  Before meals and After meals  Have you had any blood sugars above 200?  Yes   Have you had any blood sugars below 70?  No  What symptoms do you notice when your blood sugar is low?  None  What concerns do you have today about your diabetes? Blood sugar is often over 200 and Other: lightheadedness, burning or hot feet.    Do you have any of these symptoms? (Select all that apply)  Burning in feet      BP Readings from Last 2 Encounters:   12/11/23 111/70   10/20/23 119/82     Hemoglobin A1C (%)   Date Value   07/07/2023 7.1 (H)   08/02/2022 6.4 (H)   04/05/2021 5.9 (H)   01/15/2021 6.7 (H)     LDL Cholesterol Calculated (mg/dL)   Date Value   07/26/2023 27   02/15/2022 58   04/05/2021 96   01/15/2021 27             Patient has multiple concerns:    -post nasal drip- has been ongoing for more than a year, possible allergies     -started with allergies     -no cough; occasional sneeze    -lightheadedness- more than 1 year     -get up in morning, looks out window for 10-20 seconds    -if gets up right away might lose balance    -unsure if he should restart his blood thinner medication     -per notes is being held due to bleeding risk with CLL     - patient has been injecting insulin in the abdomin but starting to ache/hurt. Wants to know if there are other injection   - Current blood glucose level is 247   -complains of \"hot\" feet, has had for years.   -still on prednisone, currently 5mg    Review of Systems         Objective    /70 (BP Location: Right arm, Patient Position: Sitting, Cuff Size: Adult Large)   Pulse 95   Temp 98.1  F (36.7  C) (Temporal)   Resp 20   Wt 85.7 kg (188 lb 14.4 oz)   SpO2 99%   BMI 27.90 kg/m    Body mass index is " 27.9 kg/m .  Physical Exam   GEN: Alert and appropriately interactive for age  EYES: Eyes grossly normal to inspection, conjunctivae and sclerae normal  RESP: Breathing comfortably on room air   CV: Warm and well perfused peripheral extremities   MS: no gross musculoskeletal defects noted, no edema  NEURO: Alert and oriented. Gait normal. Speech fluent.    PSYCH: Affect normal/bright. Appearance well groomed.

## 2023-12-19 NOTE — PLAN OF CARE
Problem: Malnutrition  Goal: Improved Nutritional Intake  Outcome: Progressing   Goal Outcome Evaluation:    Pt advanced to 'gentle clears' per CRS. BG levels improved yet still elevated. Continue TPN as ordered. 100mg thiamine as pt is at risk for refeeding syndrome       normal breath sounds, breathing is unlabored without accessory muscle use, chest wall non-tender

## 2023-12-24 NOTE — ED TRIAGE NOTES
Patient presents via ems for trip and fall in the home. Struck the left cheekbone on the computer causing a laceration and bruising. When wife tried to help him up, he fell again.   Patient reports he has been off plavix for a month.     Unsure if he had Loc. Poor historian. Spouse not present

## 2023-12-24 NOTE — ED NOTES
Bed: ED28  Expected date: 12/23/23  Expected time: 9:21 PM  Means of arrival:   Comments:  [facility]

## 2023-12-24 NOTE — ED NOTES
Emergency Department Technician Wound Irrigation Note:    12/23/2023    10:14 PM      Wound location:  Laceration under left eye    Irrigation Fluid: Normal Saline    Estimated Irrigation Volume (60 mL fluid per cm): 400    Odin Donald

## 2023-12-24 NOTE — DISCHARGE INSTRUCTIONS
Your sutures will dissolve.  Please keep them clean and dry.  You may place a small breathable dressing such as gauze over the sutures to keep them clean.

## 2023-12-24 NOTE — ED PROVIDER NOTES
History     Chief Complaint:  Fall       HPI   Austin Garza is a 83 year old male with a history of Irritable bowel syndrome, Hypertension, Hyperlipidemia, Type 2 Diabetes, Stage 3 Chronic kidney disease, Colon cancer, and Paroxysmal atrial fibrillation who presents to the ED today due to a fall that occurred today. The patient disclosed that he was walking in his computer room with a lot of things in it and that's the last thing he recalled.  The next thing he remembers there was EMS standing over him.  The patient disclosed that he had a shot of alcohol tonight.  The patient notes that he has been off Plavix for a very long time. The patient denies neck pain, back pain, bodily pain, and shortness of breath. The history is limited due to the patient's limited memory of the event and the absence of a witness.     Independent Historian:    None- The Patient only     Review of External Notes:  Further history obtained from patient's wife who is at home.  She states the patient had colon cancer surgery in September and has had decreased mobility since then.  She did not witness the fall tonight which she states occurred while [facility]  She suspects that he lost his balance and fell over in conjunction with the decreased mobility above.  She confirms that he is no longer on blood thinners.      Medications:    Crestor   Cozaar  Nitrostat   Protonix   Toprol   Roxicodone     Past Medical History:    AAA   BCC  CLL  Diverticulitis of colon   Essential hypertension   Esophageal reflux   Hyperlipidemia   Irritable bowel syndrome   Diaphragmatic hernia   Macular degeneration of retina   Mumps  Squamous cell carcinoma   Type 2 diabetes mellitus   Hypertension   Skin lesion on the back   Status post coronary angiogram   Anemia   Chronic kidney disease stage 3   Facial hematoma   Shortness of breath   Cardiomyopathy  Bilateral incipient cataracts   Posterior subcapsular age related  macular degeneration of both eyes  Paroxysmal atrial fibrillation   Cancer of traverse colon   Iron deficiency anemia due to chronic blood loss   Gastrointestinal hemorrhage with melena   Thrombocytopaenia   Atherosclerotic heart disease of native coronary artery without angina pectoris     Past Surgical History:    Anesthesia cardioversion   Appendectomy   Colonoscopy   Endovascular repair aneurysm abdominal aorta  Vasectomy   Multiple skin tags   Abdominal aortogram   Viscera; embolization   Coronary angiogram   Colectomy right   Bone marrow biopsy       Physical Exam   Patient Vitals for the past 24 hrs:   BP Temp Temp src Pulse Resp SpO2   12/23/23 2137 111/64 97.8  F (36.6  C) Oral 90 23 98 %      Physical Exam  General: Laying on the ED bed  HEENT: Normocephalic, linear laceration inferior to the left orbit with surrounding ecchymosis, EOMI  Cardiac: Warm and well perfused, regular rate and rhythm  Pulm: Breathing comfortably, no accessory muscle usage, no conversational dyspnea, and lungs clear bilaterally  GI: Abdomen soft, nontender, no rigidity or guarding  MSK: C/T/L-spine nontender to palpation, no step-offs.  Anterior chest wall and posterior thorax nontender to palpation.  Pelvis stable.  Extremities x4 without bony deformity, no instability, tenderness to palpation, or painful range of motion.  Skin: Warm and dry  Neuro: Sensorimotor intact extremities x4  Psych: Pleasant mood and affect      Emergency Department Course   ECG  ECG taken at 2141, ECG read at 2154  Normal sinus rhythm   Normal ECG    When compared with prior ECG, dated 9/28/23  Rate 93 bpm. RI interval 208 ms. QRS duration 72 ms. QT/QTc 338/420 ms. P-R-T axes 47 10 19.    Imaging:  CT Cervical Spine w/o Contrast   Final Result   IMPRESSION:   HEAD CT:   No acute intracranial process.      FACIAL BONE CT:   1.  No facial bone or mandibular fracture.   2.  Left periorbital hematoma.      CERVICAL SPINE CT:   1.  No fracture or  posttraumatic subluxation.   2.  No high-grade spinal canal or neural foraminal stenosis.         CT Facial Bones without Contrast   Final Result   IMPRESSION:   HEAD CT:   No acute intracranial process.      FACIAL BONE CT:   1.  No facial bone or mandibular fracture.   2.  Left periorbital hematoma.      CERVICAL SPINE CT:   1.  No fracture or posttraumatic subluxation.   2.  No high-grade spinal canal or neural foraminal stenosis.         CT Head w/o Contrast   Final Result   IMPRESSION:   HEAD CT:   No acute intracranial process.      FACIAL BONE CT:   1.  No facial bone or mandibular fracture.   2.  Left periorbital hematoma.      CERVICAL SPINE CT:   1.  No fracture or posttraumatic subluxation.   2.  No high-grade spinal canal or neural foraminal stenosis.           Report per radiology    Laboratory:  Labs Ordered and Resulted from Time of ED Arrival to Time of ED Departure   BASIC METABOLIC PANEL - Abnormal       Result Value    Sodium 131 (*)     Potassium 4.3      Chloride 96 (*)     Carbon Dioxide (CO2) 19 (*)     Anion Gap 16 (*)     Urea Nitrogen 25.5 (*)     Creatinine 1.37 (*)     GFR Estimate 52 (*)     Calcium 8.4 (*)     Glucose 160 (*)    MAGNESIUM - Abnormal    Magnesium 1.6 (*)    ETHYL ALCOHOL LEVEL - Abnormal    Alcohol ethyl 0.17 (*)    CBC WITH PLATELETS AND DIFFERENTIAL - Abnormal    WBC Count 12.3 (*)     RBC Count 3.12 (*)     Hemoglobin 9.2 (*)     Hematocrit 28.8 (*)     MCV 92      MCH 29.5      MCHC 31.9      RDW 17.2 (*)     Platelet Count 81 (*)     % Neutrophils        % Lymphocytes        % Monocytes        % Eosinophils        % Basophils        % Immature Granulocytes        NRBCs per 100 WBC 0      Absolute Neutrophils        Absolute Lymphocytes        Absolute Monocytes        Absolute Eosinophils        Absolute Basophils        Absolute Immature Granulocytes        Absolute NRBCs 0.0     DIFFERENTIAL - Abnormal    % Neutrophils 43      % Lymphocytes 52      % Monocytes 5       % Eosinophils 0      % Basophils 0      Absolute Neutrophils 5.3      Absolute Lymphocytes 6.4 (*)     Absolute Monocytes 0.6      Absolute Eosinophils 0.0      Absolute Basophils 0.0      RBC Morphology Confirmed RBC Indices      Platelet Assessment        Value: Automated Count Confirmed. Platelet morphology is normal.    Elliptocytes Slight (*)     Teardrop Cells Slight (*)    TROPONIN T, HIGH SENSITIVITY - Normal    Troponin T, High Sensitivity 19     NT PROBNP INPATIENT - Normal    N terminal Pro BNP Inpatient 798     TSH WITH FREE T4 REFLEX - Normal    TSH 2.77     ROUTINE UA WITH MICROSCOPIC REFLEX TO CULTURE        Laceration Repair      Procedure: Laceration Repair    Indication: Laceration    Consent: Verbal    Location: Left Face     Length: 1.5 cm    Preparation: Irrigation with Sterile Saline.    Anesthesia/Sedation: Lidocaine with Epinephrine - 1%      Treatment/Exploration: Wound explored, no foreign bodies found     Closure: The wound was closed with one layer. Skin/superficial layer was closed with 5 x 5-0 Fast gut absorbable  using Interrupted sutures.     Patient Status: The patient tolerated the procedure well: Yes. There were no complications.       Emergency Department Course & Assessments:    Interventions:  Medications   lidocaine 1% with EPINEPHrine 1:100,000 1 %-[id number] injection (has no administration in time range)        Assessments:  2143 I obtained history and examined the patient as noted above.   I rechecked the patient's status and informed them of the findings.     Independent Interpretation (X-rays, CTs, rhythm strip):  None    Consultations/Discussion of Management or Tests:  None    Social Determinants of Health affecting care:  None     Disposition:  The patient was discharged to home.     Impression & Plan    CMS Diagnoses: None    Medical Decision Makin-year-old male presents with fall as above.  Unclear circumstances surrounding patient's fall, though it seems  likely was mechanical in nature.  Exam is significant for laceration inferior to the left eye.  EOMs are thankfully intact, CT face showed no evidence of orbital or facial fracture.  CT of the head and neck likewise without acute findings.  EKG was unremarkable and screening lab work was reassuring.  Alcohol was 0.17.  After period of observation in the ED the patient is clinically sober and reevaluation.  Laceration repaired as above.  Plan is discharge home with PCP follow-up for further care.    Critical Care time:  was 0 minutes for this patient excluding procedures.    Diagnosis:    ICD-10-CM    1. Fall, initial encounter  W19.XXXA       2. Facial laceration, initial encounter  S01.81XA               Scribe Disclosure:    I, Niko Cuevas, am serving as a scribe at 10:03 PM on 12/23/2023 to document services personally performed by Will Cuevas MD based on my observations and the provider's statements to me.    12/23/2023   Will Cuevas MD King, Colin, MD  12/24/23 0058

## 2023-12-27 NOTE — PROGRESS NOTES
"  Assessment & Plan     Coccydynia  Continue with donut.     Facial laceration, subsequent encounter  Healing well.     JESSICA Gallego [facility] CYRUS Leblanc is a 83 year old, presenting for the following health issues:  ER F/U      12/27/2023     2:39 PM   Additional Questions   Roomed by Elizabeth   Accompanied by spouse         12/27/2023     2:39 PM   Patient Reported Additional Medications   Patient reports taking the following new medications none       History of Present Illness       Reason for visit:  Folowuo to ER on 12/23  Symptom onset:  1-3 days ago  Symptoms include:  See ER notes  Symptom intensity:  Moderate  Symptom progression:  Staying the same    He eats 2-3 servings of fruits and vegetables daily.He consumes 1 sweetened beverage(s) daily.He exercises with enough effort to increase his heart rate 9 or less minutes per day.  He is missing 1 dose(s) of medications per week.  He is not taking prescribed medications regularly due to remembering to take.         ED/UC Followup:    Facility[facility]  Date of visit: 12/23/23  Reason for visit: fall  Current Status: still having pain in coccyx, has sutures in left cheek    Reviewed ED notes. Patient fell after having alcohol and has underlying instability. Was not using cane.    Coccyx is painful. Using a donut.   Facial laceration. Dissolvable sutures. No discharge at site.     Review of Systems   Constitutional, HEENT, cardiovascular, pulmonary, gi and gu systems are negative, except as otherwise noted.      Objective    /54 (BP Location: Right arm, Patient Position: Sitting, Cuff Size: Adult Regular)   Pulse 89   Temp 98.4  F (36.9  C) (Oral)   Resp 20   Ht 1.753 m (5' 9\")   Wt 83.8 kg (184 lb 12.8 oz)   SpO2 100%   BMI 27.29 kg/m    Body mass index is 27.29 kg/m .  Physical Exam   GENERAL:alert and no distress  EYES: Eyes grossly normal to inspection, PERRL and conjunctivae and sclerae " normal; significant evolving ecchymosis left periorbital region  RESP: lungs clear to auscultation - no rales, rhonchi or wheezes  CV: regular rate and rhythm, normal S1 S2, no S3 or S4  Skin: healing laceration of the left inferior eye-sutures in place    No results found for any visits on 12/27/23.

## 2024-01-01 ENCOUNTER — PATIENT OUTREACH (OUTPATIENT)
Dept: ONCOLOGY | Facility: CLINIC | Age: 84
End: 2024-01-01
Payer: COMMERCIAL

## 2024-01-01 ENCOUNTER — MEDICAL CORRESPONDENCE (OUTPATIENT)
Dept: HEALTH INFORMATION MANAGEMENT | Facility: CLINIC | Age: 84
End: 2024-01-01

## 2024-01-01 ENCOUNTER — HOME INFUSION (PRE-WILLOW HOME INFUSION) (OUTPATIENT)
Dept: PHARMACY | Facility: CLINIC | Age: 84
End: 2024-01-01
Payer: COMMERCIAL

## 2024-01-01 ENCOUNTER — MEDICAL CORRESPONDENCE (OUTPATIENT)
Dept: HEALTH INFORMATION MANAGEMENT | Facility: CLINIC | Age: 84
End: 2024-01-01
Payer: COMMERCIAL

## 2024-01-01 ENCOUNTER — MYC MEDICAL ADVICE (OUTPATIENT)
Dept: ONCOLOGY | Facility: CLINIC | Age: 84
End: 2024-01-01
Payer: COMMERCIAL

## 2024-01-01 ENCOUNTER — TRANSFERRED RECORDS (OUTPATIENT)
Dept: HEALTH INFORMATION MANAGEMENT | Facility: CLINIC | Age: 84
End: 2024-01-01
Payer: COMMERCIAL

## 2024-01-01 ENCOUNTER — HOSPITAL ENCOUNTER (OUTPATIENT)
Facility: CLINIC | Age: 84
Setting detail: OBSERVATION
Discharge: HOME | End: 2024-01-24
Attending: STUDENT IN AN ORGANIZED HEALTH CARE EDUCATION/TRAINING PROGRAM | Admitting: RADIOLOGY
Payer: COMMERCIAL

## 2024-01-01 ENCOUNTER — MYC MEDICAL ADVICE (OUTPATIENT)
Dept: PEDIATRICS | Facility: CLINIC | Age: 84
End: 2024-01-01
Payer: COMMERCIAL

## 2024-01-01 ENCOUNTER — TRANSCRIBE ORDERS (OUTPATIENT)
Dept: OTHER | Age: 84
End: 2024-01-01

## 2024-01-01 ENCOUNTER — TELEPHONE (OUTPATIENT)
Dept: PEDIATRICS | Facility: CLINIC | Age: 84
End: 2024-01-01
Payer: COMMERCIAL

## 2024-01-01 ENCOUNTER — TELEPHONE (OUTPATIENT)
Dept: PEDIATRICS | Facility: CLINIC | Age: 84
End: 2024-01-01

## 2024-01-01 ENCOUNTER — LAB (OUTPATIENT)
Dept: ONCOLOGY | Facility: CLINIC | Age: 84
End: 2024-01-01
Attending: INTERNAL MEDICINE
Payer: COMMERCIAL

## 2024-01-01 ENCOUNTER — VIRTUAL VISIT (OUTPATIENT)
Dept: PEDIATRICS | Facility: CLINIC | Age: 84
End: 2024-01-01
Payer: COMMERCIAL

## 2024-01-01 ENCOUNTER — MYC MEDICAL ADVICE (OUTPATIENT)
Dept: PEDIATRICS | Facility: CLINIC | Age: 84
End: 2024-01-01

## 2024-01-01 ENCOUNTER — APPOINTMENT (OUTPATIENT)
Dept: ULTRASOUND IMAGING | Facility: CLINIC | Age: 84
End: 2024-01-01
Attending: PHYSICIAN ASSISTANT
Payer: COMMERCIAL

## 2024-01-01 ENCOUNTER — APPOINTMENT (OUTPATIENT)
Dept: PHYSICAL THERAPY | Facility: CLINIC | Age: 84
End: 2024-01-01
Payer: COMMERCIAL

## 2024-01-01 ENCOUNTER — THERAPY VISIT (OUTPATIENT)
Dept: OCCUPATIONAL THERAPY | Facility: CLINIC | Age: 84
End: 2024-01-01
Attending: STUDENT IN AN ORGANIZED HEALTH CARE EDUCATION/TRAINING PROGRAM
Payer: COMMERCIAL

## 2024-01-01 ENCOUNTER — OFFICE VISIT (OUTPATIENT)
Dept: OPTOMETRY | Facility: CLINIC | Age: 84
End: 2024-01-01
Payer: COMMERCIAL

## 2024-01-01 ENCOUNTER — LAB (OUTPATIENT)
Dept: INFUSION THERAPY | Facility: CLINIC | Age: 84
End: 2024-01-01
Attending: INTERNAL MEDICINE
Payer: COMMERCIAL

## 2024-01-01 ENCOUNTER — PATIENT OUTREACH (OUTPATIENT)
Dept: CARE COORDINATION | Facility: CLINIC | Age: 84
End: 2024-01-01
Payer: COMMERCIAL

## 2024-01-01 ENCOUNTER — PATIENT OUTREACH (OUTPATIENT)
Dept: ONCOLOGY | Facility: CLINIC | Age: 84
End: 2024-01-01

## 2024-01-01 ENCOUNTER — APPOINTMENT (OUTPATIENT)
Dept: GENERAL RADIOLOGY | Facility: CLINIC | Age: 84
End: 2024-01-01
Attending: STUDENT IN AN ORGANIZED HEALTH CARE EDUCATION/TRAINING PROGRAM
Payer: COMMERCIAL

## 2024-01-01 ENCOUNTER — TELEPHONE (OUTPATIENT)
Dept: ONCOLOGY | Facility: CLINIC | Age: 84
End: 2024-01-01
Payer: COMMERCIAL

## 2024-01-01 ENCOUNTER — HOSPITAL ENCOUNTER (OUTPATIENT)
Dept: CT IMAGING | Facility: CLINIC | Age: 84
Discharge: HOME | End: 2024-01-17
Attending: INTERNAL MEDICINE | Admitting: INTERNAL MEDICINE
Payer: COMMERCIAL

## 2024-01-01 ENCOUNTER — NURSE TRIAGE (OUTPATIENT)
Dept: NURSING | Facility: CLINIC | Age: 84
End: 2024-01-01
Payer: COMMERCIAL

## 2024-01-01 ENCOUNTER — APPOINTMENT (OUTPATIENT)
Dept: PHYSICAL THERAPY | Facility: CLINIC | Age: 84
End: 2024-01-01
Attending: PHYSICIAN ASSISTANT
Payer: COMMERCIAL

## 2024-01-01 VITALS
BODY MASS INDEX: 25.75 KG/M2 | HEART RATE: 82 BPM | OXYGEN SATURATION: 98 % | DIASTOLIC BLOOD PRESSURE: 61 MMHG | WEIGHT: 174.4 LBS | SYSTOLIC BLOOD PRESSURE: 124 MMHG | RESPIRATION RATE: 18 BRPM | TEMPERATURE: 98.5 F

## 2024-01-01 VITALS
HEART RATE: 100 BPM | DIASTOLIC BLOOD PRESSURE: 68 MMHG | RESPIRATION RATE: 18 BRPM | HEIGHT: 69 IN | SYSTOLIC BLOOD PRESSURE: 108 MMHG | OXYGEN SATURATION: 99 % | WEIGHT: 175 LBS | BODY MASS INDEX: 25.92 KG/M2 | TEMPERATURE: 97.5 F

## 2024-01-01 DIAGNOSIS — Z53.9 DIAGNOSIS NOT YET DEFINED: Primary | ICD-10-CM

## 2024-01-01 DIAGNOSIS — C18.9 ADENOCARCINOMA OF COLON METASTATIC TO LIVER (H): Primary | ICD-10-CM
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
## 2024-01-01 DIAGNOSIS — R26.89 OTHER ABNORMALITIES OF GAIT AND MOBILITY: ICD-10-CM

## 2024-01-01 DIAGNOSIS — D50.0 IRON DEFICIENCY ANEMIA DUE TO CHRONIC BLOOD LOSS: ICD-10-CM

## 2024-01-01 DIAGNOSIS — E11.69 TYPE 2 DIABETES MELLITUS WITH OTHER SPECIFIED COMPLICATION, WITH LONG-TERM CURRENT USE OF INSULIN (H): ICD-10-CM

## 2024-01-01 DIAGNOSIS — H25.813 MIXED TYPE AGE-RELATED CATARACT, BOTH EYES: ICD-10-CM

## 2024-01-01 DIAGNOSIS — Z79.4 TYPE 2 DIABETES MELLITUS WITH OTHER SPECIFIED COMPLICATION, WITH LONG-TERM CURRENT USE OF INSULIN (H): Primary | ICD-10-CM

## 2024-01-01 DIAGNOSIS — I25.10 ATHEROSCLEROSIS OF NATIVE CORONARY ARTERY OF NATIVE HEART WITHOUT ANGINA PECTORIS: ICD-10-CM

## 2024-01-01 DIAGNOSIS — E83.42 HYPOMAGNESEMIA: ICD-10-CM

## 2024-01-01 DIAGNOSIS — N18.31 STAGE 3A CHRONIC KIDNEY DISEASE (H): ICD-10-CM

## 2024-01-01 DIAGNOSIS — H04.123 DRY EYES: Primary | ICD-10-CM

## 2024-01-01 DIAGNOSIS — H04.203 WATERY EYES: ICD-10-CM

## 2024-01-01 DIAGNOSIS — Z45.2 ENCOUNTER FOR CARE RELATED TO PORT-A-CATH: ICD-10-CM

## 2024-01-01 DIAGNOSIS — Z51.11 ENCOUNTER FOR ANTINEOPLASTIC CHEMOTHERAPY: ICD-10-CM

## 2024-01-01 DIAGNOSIS — C78.7 ADENOCARCINOMA OF COLON METASTATIC TO LIVER (H): Primary | ICD-10-CM

## 2024-01-01 DIAGNOSIS — C18.4 MALIGNANT NEOPLASM OF TRANSVERSE COLON (H): ICD-10-CM

## 2024-01-01 DIAGNOSIS — R53.1 GENERALIZED WEAKNESS: Primary | ICD-10-CM

## 2024-01-01 DIAGNOSIS — D69.6 THROMBOCYTOPENIA (H): ICD-10-CM

## 2024-01-01 DIAGNOSIS — R41.89 COGNITIVE IMPAIRMENT: ICD-10-CM

## 2024-01-01 DIAGNOSIS — R53.1 WEAKNESS: ICD-10-CM

## 2024-01-01 DIAGNOSIS — R29.6 REPEATED FALLS: Primary | ICD-10-CM

## 2024-01-01 DIAGNOSIS — H52.13 MYOPIA OF BOTH EYES WITH ASTIGMATISM: ICD-10-CM

## 2024-01-01 DIAGNOSIS — Z90.49 ACQUIRED ABSENCE OF OTHER SPECIFIED PARTS OF DIGESTIVE TRACT: ICD-10-CM

## 2024-01-01 DIAGNOSIS — Z79.4 TYPE 2 DIABETES MELLITUS WITH OTHER SPECIFIED COMPLICATION, WITH LONG-TERM CURRENT USE OF INSULIN (H): ICD-10-CM

## 2024-01-01 DIAGNOSIS — E66.01 MORBIDLY OBESE (H): ICD-10-CM

## 2024-01-01 DIAGNOSIS — Z78.9 DECREASED ACTIVITIES OF DAILY LIVING (ADL): ICD-10-CM

## 2024-01-01 DIAGNOSIS — R41.3 MEMORY PROBLEM: ICD-10-CM

## 2024-01-01 DIAGNOSIS — E78.49 OTHER HYPERLIPIDEMIA: ICD-10-CM

## 2024-01-01 DIAGNOSIS — C91.10 CLL (CHRONIC LYMPHOCYTIC LEUKEMIA) (H): ICD-10-CM

## 2024-01-01 DIAGNOSIS — I10 HYPERTENSION GOAL BP (BLOOD PRESSURE) < 140/90: ICD-10-CM

## 2024-01-01 DIAGNOSIS — C18.4 MALIGNANT NEOPLASM OF TRANSVERSE COLON (H): Primary | ICD-10-CM

## 2024-01-01 DIAGNOSIS — C18.4 CANCER OF TRANSVERSE COLON (H): ICD-10-CM

## 2024-01-01 DIAGNOSIS — C91.10 CHRONIC LYMPHOCYTIC LEUKEMIA OF B-CELL TYPE NOT HAVING ACHIEVED REMISSION (H): ICD-10-CM

## 2024-01-01 DIAGNOSIS — E11.69 TYPE 2 DIABETES MELLITUS WITH OTHER SPECIFIED COMPLICATION, WITH LONG-TERM CURRENT USE OF INSULIN (H): Primary | ICD-10-CM

## 2024-01-01 DIAGNOSIS — Z78.9 IMPAIRED INSTRUMENTAL ACTIVITIES OF DAILY LIVING (IADL): Primary | ICD-10-CM

## 2024-01-01 DIAGNOSIS — H52.203 MYOPIA OF BOTH EYES WITH ASTIGMATISM: ICD-10-CM

## 2024-01-01 DIAGNOSIS — D50.9 IRON DEFICIENCY ANEMIA, UNSPECIFIED IRON DEFICIENCY ANEMIA TYPE: ICD-10-CM

## 2024-01-01 DIAGNOSIS — E87.1 HYPONATREMIA: ICD-10-CM

## 2024-01-01 DIAGNOSIS — E11.9 DIABETES MELLITUS WITHOUT OPHTHALMIC MANIFESTATIONS (H): Primary | ICD-10-CM

## 2024-01-01 DIAGNOSIS — I48.0 PAROXYSMAL ATRIAL FIBRILLATION (H): ICD-10-CM

## 2024-01-01 DIAGNOSIS — S01.81XS LACERATION WITHOUT FOREIGN BODY OF OTHER PART OF HEAD, SEQUELA: ICD-10-CM

## 2024-01-01 DIAGNOSIS — H52.4 PRESBYOPIA: ICD-10-CM

## 2024-01-01 LAB
ABO/RH(D): NORMAL
ALBUMIN SERPL BCG-MCNC: 2.8 G/DL (ref 3.5–5.2)
ALBUMIN SERPL BCG-MCNC: 3.2 G/DL (ref 3.5–5.2)
ALBUMIN SERPL BCG-MCNC: 3.5 G/DL (ref 3.5–5.2)
ALBUMIN UR-MCNC: 30 MG/DL
ALP SERPL-CCNC: 209 U/L (ref 40–150)
ALP SERPL-CCNC: 250 U/L (ref 40–150)
ALP SERPL-CCNC: 362 U/L (ref 40–150)
ALT SERPL W P-5'-P-CCNC: 110 U/L (ref 0–70)
ALT SERPL W P-5'-P-CCNC: 134 U/L (ref 0–70)
ALT SERPL W P-5'-P-CCNC: 90 U/L (ref 0–70)
ANION GAP SERPL CALCULATED.3IONS-SCNC: 11 MMOL/L (ref 7–15)
ANION GAP SERPL CALCULATED.3IONS-SCNC: 14 MMOL/L (ref 7–15)
ANION GAP SERPL CALCULATED.3IONS-SCNC: 14 MMOL/L (ref 7–15)
ANTIBODY SCREEN: NEGATIVE
APPEARANCE UR: ABNORMAL
AST SERPL W P-5'-P-CCNC: 153 U/L (ref 0–45)
AST SERPL W P-5'-P-CCNC: 70 U/L (ref 0–45)
AST SERPL W P-5'-P-CCNC: 89 U/L (ref 0–45)
ATRIAL RATE - MUSE: 106 BPM
BACTERIA #/AREA URNS HPF: ABNORMAL /HPF
BASOPHILS # BLD AUTO: 0 10E3/UL (ref 0–0.2)
BASOPHILS NFR BLD AUTO: 0 %
BILIRUB SERPL-MCNC: 0.6 MG/DL
BILIRUB SERPL-MCNC: 0.8 MG/DL
BILIRUB SERPL-MCNC: 0.9 MG/DL
BILIRUB UR QL STRIP: NEGATIVE
BLD PROD TYP BPU: NORMAL
BLOOD COMPONENT TYPE: NORMAL
BUN SERPL-MCNC: 23.4 MG/DL (ref 8–23)
BUN SERPL-MCNC: 25.5 MG/DL (ref 8–23)
BUN SERPL-MCNC: 26.2 MG/DL (ref 8–23)
BUN SERPL-MCNC: 29.4 MG/DL (ref 8–23)
BUN SERPL-MCNC: 30.8 MG/DL (ref 8–23)
CALCIUM SERPL-MCNC: 10 MG/DL (ref 8.8–10.2)
CALCIUM SERPL-MCNC: 8 MG/DL (ref 8.8–10.2)
CALCIUM SERPL-MCNC: 8 MG/DL (ref 8.8–10.2)
CALCIUM SERPL-MCNC: 9.1 MG/DL (ref 8.8–10.2)
CALCIUM SERPL-MCNC: 9.1 MG/DL (ref 8.8–10.2)
CEA SERPL-MCNC: 200 NG/ML
CHLORIDE SERPL-SCNC: 102 MMOL/L (ref 98–107)
CHLORIDE SERPL-SCNC: 92 MMOL/L (ref 98–107)
CHLORIDE SERPL-SCNC: 95 MMOL/L (ref 98–107)
CHLORIDE SERPL-SCNC: 96 MMOL/L (ref 98–107)
CHLORIDE SERPL-SCNC: 97 MMOL/L (ref 98–107)
CODING SYSTEM: NORMAL
COLOR UR AUTO: YELLOW
CREAT SERPL-MCNC: 1.22 MG/DL (ref 0.67–1.17)
CREAT SERPL-MCNC: 1.39 MG/DL (ref 0.67–1.17)
CREAT SERPL-MCNC: 1.4 MG/DL (ref 0.67–1.17)
CREAT SERPL-MCNC: 1.47 MG/DL (ref 0.67–1.17)
CREAT SERPL-MCNC: 1.53 MG/DL (ref 0.67–1.17)
CROSSMATCH: NORMAL
DEPRECATED HCO3 PLAS-SCNC: 19 MMOL/L (ref 22–29)
DEPRECATED HCO3 PLAS-SCNC: 20 MMOL/L (ref 22–29)
DEPRECATED HCO3 PLAS-SCNC: 21 MMOL/L (ref 22–29)
DEPRECATED HCO3 PLAS-SCNC: 22 MMOL/L (ref 22–29)
DEPRECATED HCO3 PLAS-SCNC: 25 MMOL/L (ref 22–29)
DIASTOLIC BLOOD PRESSURE - MUSE: NORMAL MMHG
EGFRCR SERPLBLD CKD-EPI 2021: 45 ML/MIN/1.73M2
EGFRCR SERPLBLD CKD-EPI 2021: 48 ML/MIN/1.73M2
EGFRCR SERPLBLD CKD-EPI 2021: 50 ML/MIN/1.73M2
EGFRCR SERPLBLD CKD-EPI 2021: 51 ML/MIN/1.73M2
EGFRCR SERPLBLD CKD-EPI 2021: 60 ML/MIN/1.73M2
EOSINOPHIL # BLD AUTO: 0 10E3/UL (ref 0–0.7)
EOSINOPHIL NFR BLD AUTO: 0 %
ERYTHROCYTE [DISTWIDTH] IN BLOOD BY AUTOMATED COUNT: 16.1 % (ref 10–15)
ERYTHROCYTE [DISTWIDTH] IN BLOOD BY AUTOMATED COUNT: 16.2 % (ref 10–15)
ERYTHROCYTE [DISTWIDTH] IN BLOOD BY AUTOMATED COUNT: 16.6 % (ref 10–15)
ERYTHROCYTE [DISTWIDTH] IN BLOOD BY AUTOMATED COUNT: 16.7 % (ref 10–15)
ERYTHROCYTE [DISTWIDTH] IN BLOOD BY AUTOMATED COUNT: 16.8 % (ref 10–15)
ERYTHROCYTE [DISTWIDTH] IN BLOOD BY AUTOMATED COUNT: 16.9 % (ref 10–15)
ERYTHROCYTE [DISTWIDTH] IN BLOOD BY AUTOMATED COUNT: 17 % (ref 10–15)
FERRITIN SERPL-MCNC: 441 NG/ML (ref 31–409)
FERRITIN SERPL-MCNC: 723 NG/ML (ref 31–409)
FLUAV RNA SPEC QL NAA+PROBE: NEGATIVE
FLUBV RNA RESP QL NAA+PROBE: NEGATIVE
FOLATE SERPL-MCNC: 7.5 NG/ML (ref 4.6–34.8)
GLUCOSE BLDC GLUCOMTR-MCNC: 112 MG/DL (ref 70–99)
GLUCOSE BLDC GLUCOMTR-MCNC: 129 MG/DL (ref 70–99)
GLUCOSE BLDC GLUCOMTR-MCNC: 160 MG/DL (ref 70–99)
GLUCOSE BLDC GLUCOMTR-MCNC: 166 MG/DL (ref 70–99)
GLUCOSE BLDC GLUCOMTR-MCNC: 171 MG/DL (ref 70–99)
GLUCOSE BLDC GLUCOMTR-MCNC: 184 MG/DL (ref 70–99)
GLUCOSE BLDC GLUCOMTR-MCNC: 198 MG/DL (ref 70–99)
GLUCOSE BLDC GLUCOMTR-MCNC: 210 MG/DL (ref 70–99)
GLUCOSE BLDC GLUCOMTR-MCNC: 211 MG/DL (ref 70–99)
GLUCOSE BLDC GLUCOMTR-MCNC: 228 MG/DL (ref 70–99)
GLUCOSE BLDC GLUCOMTR-MCNC: 234 MG/DL (ref 70–99)
GLUCOSE BLDC GLUCOMTR-MCNC: 237 MG/DL (ref 70–99)
GLUCOSE BLDC GLUCOMTR-MCNC: 242 MG/DL (ref 70–99)
GLUCOSE BLDC GLUCOMTR-MCNC: 268 MG/DL (ref 70–99)
GLUCOSE BLDC GLUCOMTR-MCNC: 272 MG/DL (ref 70–99)
GLUCOSE BLDC GLUCOMTR-MCNC: 285 MG/DL (ref 70–99)
GLUCOSE BLDC GLUCOMTR-MCNC: 315 MG/DL (ref 70–99)
GLUCOSE BLDC GLUCOMTR-MCNC: 325 MG/DL (ref 70–99)
GLUCOSE SERPL-MCNC: 124 MG/DL (ref 70–99)
GLUCOSE SERPL-MCNC: 148 MG/DL (ref 70–99)
GLUCOSE SERPL-MCNC: 155 MG/DL (ref 70–99)
GLUCOSE SERPL-MCNC: 160 MG/DL (ref 70–99)
GLUCOSE SERPL-MCNC: 198 MG/DL (ref 70–99)
GLUCOSE UR STRIP-MCNC: NEGATIVE MG/DL
HBA1C MFR BLD: 6.9 %
HCT VFR BLD AUTO: 20.4 % (ref 40–53)
HCT VFR BLD AUTO: 22.1 % (ref 40–53)
HCT VFR BLD AUTO: 23.7 % (ref 40–53)
HCT VFR BLD AUTO: 23.9 % (ref 40–53)
HCT VFR BLD AUTO: 26.8 % (ref 40–53)
HCT VFR BLD AUTO: 27.8 % (ref 40–53)
HCT VFR BLD AUTO: 33.1 % (ref 40–53)
HGB BLD-MCNC: 10.5 G/DL (ref 13.3–17.7)
HGB BLD-MCNC: 6.6 G/DL (ref 13.3–17.7)
HGB BLD-MCNC: 7.1 G/DL (ref 13.3–17.7)
HGB BLD-MCNC: 7.6 G/DL (ref 13.3–17.7)
HGB BLD-MCNC: 7.6 G/DL (ref 13.3–17.7)
HGB BLD-MCNC: 8.6 G/DL (ref 13.3–17.7)
HGB BLD-MCNC: 8.8 G/DL (ref 13.3–17.7)
HGB UR QL STRIP: NEGATIVE
IMM GRANULOCYTES # BLD: 0 10E3/UL
IMM GRANULOCYTES # BLD: 0 10E3/UL
IMM GRANULOCYTES # BLD: 0.1 10E3/UL
IMM GRANULOCYTES # BLD: 0.1 10E3/UL
IMM GRANULOCYTES NFR BLD: 1 %
INR PPP: 1.25 (ref 0.85–1.15)
INTERPRETATION ECG - MUSE: NORMAL
INTERPRETATION: NORMAL
IRON BINDING CAPACITY (ROCHE): 250 UG/DL (ref 240–430)
IRON BINDING CAPACITY (ROCHE): 270 UG/DL (ref 240–430)
IRON SATN MFR SERPL: 11 % (ref 15–46)
IRON SATN MFR SERPL: 23 % (ref 15–46)
IRON SERPL-MCNC: 30 UG/DL (ref 61–157)
IRON SERPL-MCNC: 57 UG/DL (ref 61–157)
ISSUE DATE AND TIME: NORMAL
KETONES UR STRIP-MCNC: NEGATIVE MG/DL
LAB DIRECTOR COMMENTS: NORMAL
LAB DIRECTOR DISCLAIMER: NORMAL
LAB DIRECTOR INTERPRETATION: NORMAL
LAB DIRECTOR METHODOLOGY: NORMAL
LAB DIRECTOR RESULTS: NORMAL
LACTATE SERPL-SCNC: 0.9 MMOL/L (ref 0.7–2)
LDH SERPL L TO P-CCNC: 253 U/L (ref 0–250)
LEUKOCYTE ESTERASE UR QL STRIP: NEGATIVE
LYMPHOCYTES # BLD AUTO: 1.4 10E3/UL (ref 0.8–5.3)
LYMPHOCYTES # BLD AUTO: 1.6 10E3/UL (ref 0.8–5.3)
LYMPHOCYTES # BLD AUTO: 2.5 10E3/UL (ref 0.8–5.3)
LYMPHOCYTES # BLD AUTO: 4.2 10E3/UL (ref 0.8–5.3)
LYMPHOCYTES NFR BLD AUTO: 20 %
LYMPHOCYTES NFR BLD AUTO: 30 %
LYMPHOCYTES NFR BLD AUTO: 35 %
LYMPHOCYTES NFR BLD AUTO: 40 %
MAGNESIUM SERPL-MCNC: 1.3 MG/DL (ref 1.7–2.3)
MAGNESIUM SERPL-MCNC: 1.7 MG/DL (ref 1.7–2.3)
MAGNESIUM SERPL-MCNC: 1.8 MG/DL (ref 1.7–2.3)
MCH RBC QN AUTO: 28.4 PG (ref 26.5–33)
MCH RBC QN AUTO: 28.5 PG (ref 26.5–33)
MCH RBC QN AUTO: 28.6 PG (ref 26.5–33)
MCH RBC QN AUTO: 28.8 PG (ref 26.5–33)
MCH RBC QN AUTO: 29 PG (ref 26.5–33)
MCH RBC QN AUTO: 29.2 PG (ref 26.5–33)
MCH RBC QN AUTO: 29.3 PG (ref 26.5–33)
MCHC RBC AUTO-ENTMCNC: 31.7 G/DL (ref 31.5–36.5)
MCHC RBC AUTO-ENTMCNC: 31.7 G/DL (ref 31.5–36.5)
MCHC RBC AUTO-ENTMCNC: 31.8 G/DL (ref 31.5–36.5)
MCHC RBC AUTO-ENTMCNC: 32.1 G/DL (ref 31.5–36.5)
MCHC RBC AUTO-ENTMCNC: 32.4 G/DL (ref 31.5–36.5)
MCV RBC AUTO: 90 FL (ref 78–100)
MCV RBC AUTO: 91 FL (ref 78–100)
MCV RBC AUTO: 91 FL (ref 78–100)
MONOCYTES # BLD AUTO: 0.2 10E3/UL (ref 0–1.3)
MONOCYTES # BLD AUTO: 0.3 10E3/UL (ref 0–1.3)
MONOCYTES # BLD AUTO: 0.4 10E3/UL (ref 0–1.3)
MONOCYTES # BLD AUTO: 0.4 10E3/UL (ref 0–1.3)
MONOCYTES NFR BLD AUTO: 3 %
MONOCYTES NFR BLD AUTO: 4 %
MONOCYTES NFR BLD AUTO: 5 %
MONOCYTES NFR BLD AUTO: 5 %
MUCOUS THREADS #/AREA URNS LPF: PRESENT /LPF
NEUTROPHILS # BLD AUTO: 3.5 10E3/UL (ref 1.6–8.3)
NEUTROPHILS # BLD AUTO: 4.1 10E3/UL (ref 1.6–8.3)
NEUTROPHILS # BLD AUTO: 4.9 10E3/UL (ref 1.6–8.3)
NEUTROPHILS # BLD AUTO: 5.7 10E3/UL (ref 1.6–8.3)
NEUTROPHILS NFR BLD AUTO: 56 %
NEUTROPHILS NFR BLD AUTO: 59 %
NEUTROPHILS NFR BLD AUTO: 65 %
NEUTROPHILS NFR BLD AUTO: 74 %
NITRATE UR QL: NEGATIVE
NRBC # BLD AUTO: 0 10E3/UL
NRBC BLD AUTO-RTO: 0 /100
OSMOLALITY SERPL: 277 MMOL/KG (ref 280–301)
OSMOLALITY UR: 519 MMOL/KG (ref 100–1200)
P AXIS - MUSE: 58 DEGREES
PATH REPORT.COMMENTS IMP SPEC: ABNORMAL
PATH REPORT.COMMENTS IMP SPEC: ABNORMAL
PATH REPORT.COMMENTS IMP SPEC: NORMAL
PATH REPORT.COMMENTS IMP SPEC: YES
PATH REPORT.FINAL DX SPEC: ABNORMAL
PATH REPORT.FINAL DX SPEC: NORMAL
PATH REPORT.GROSS SPEC: ABNORMAL
PATH REPORT.MICROSCOPIC SPEC OTHER STN: ABNORMAL
PATH REPORT.MICROSCOPIC SPEC OTHER STN: ABNORMAL
PATH REPORT.MICROSCOPIC SPEC OTHER STN: NORMAL
PATH REPORT.MICROSCOPIC SPEC OTHER STN: NORMAL
PATH REPORT.RELEVANT HX SPEC: ABNORMAL
PH UR STRIP: 5 [PH] (ref 5–7)
PHOSPHATE SERPL-MCNC: 3 MG/DL (ref 2.5–4.5)
PHOTO IMAGE: ABNORMAL
PLATELET # BLD AUTO: 153 10E3/UL (ref 150–450)
PLATELET # BLD AUTO: 64 10E3/UL (ref 150–450)
PLATELET # BLD AUTO: 68 10E3/UL (ref 150–450)
PLATELET # BLD AUTO: 69 10E3/UL (ref 150–450)
PLATELET # BLD AUTO: 79 10E3/UL (ref 150–450)
PLATELET # BLD AUTO: 95 10E3/UL (ref 150–450)
PLATELET # BLD AUTO: 99 10E3/UL (ref 150–450)
POTASSIUM SERPL-SCNC: 4.1 MMOL/L (ref 3.4–5.3)
POTASSIUM SERPL-SCNC: 4.1 MMOL/L (ref 3.4–5.3)
POTASSIUM SERPL-SCNC: 4.5 MMOL/L (ref 3.4–5.3)
POTASSIUM SERPL-SCNC: 4.6 MMOL/L (ref 3.4–5.3)
POTASSIUM SERPL-SCNC: 5 MMOL/L (ref 3.4–5.3)
PR INTERVAL - MUSE: 202 MS
PROT SERPL-MCNC: 5.4 G/DL (ref 6.4–8.3)
PROT SERPL-MCNC: 6.6 G/DL (ref 6.4–8.3)
PROT SERPL-MCNC: 6.8 G/DL (ref 6.4–8.3)
QRS DURATION - MUSE: 70 MS
QT - MUSE: 312 MS
QTC - MUSE: 414 MS
R AXIS - MUSE: -9 DEGREES
RBC # BLD AUTO: 2.26 10E6/UL (ref 4.4–5.9)
RBC # BLD AUTO: 2.42 10E6/UL (ref 4.4–5.9)
RBC # BLD AUTO: 2.62 10E6/UL (ref 4.4–5.9)
RBC # BLD AUTO: 2.66 10E6/UL (ref 4.4–5.9)
RBC # BLD AUTO: 2.99 10E6/UL (ref 4.4–5.9)
RBC # BLD AUTO: 3.1 10E6/UL (ref 4.4–5.9)
RBC # BLD AUTO: 3.69 10E6/UL (ref 4.4–5.9)
RBC URINE: 1 /HPF
RENAL TUB EPI: 2 /HPF
RETICS # AUTO: 0.04 10E6/UL (ref 0.03–0.1)
RETICS/RBC NFR AUTO: 1.6 % (ref 0.5–2)
RSV RNA SPEC NAA+PROBE: NEGATIVE
SARS-COV-2 RNA RESP QL NAA+PROBE: NEGATIVE
SIGNIFICANT RESULTS: NORMAL
SODIUM SERPL-SCNC: 127 MMOL/L (ref 135–145)
SODIUM SERPL-SCNC: 128 MMOL/L (ref 135–145)
SODIUM SERPL-SCNC: 128 MMOL/L (ref 135–145)
SODIUM SERPL-SCNC: 130 MMOL/L (ref 135–145)
SODIUM SERPL-SCNC: 132 MMOL/L (ref 135–145)
SODIUM SERPL-SCNC: 133 MMOL/L (ref 135–145)
SODIUM UR-SCNC: 29 MMOL/L
SP GR UR STRIP: 1.02 (ref 1–1.03)
SPECIMEN DESCRIPTION: NORMAL
SPECIMEN DESCRIPTION: NORMAL
SPECIMEN EXPIRATION DATE: NORMAL
SPECIMEN STATUS: NORMAL
SQUAMOUS EPITHELIAL: <1 /HPF
STFR SERPL-MCNC: 4.4 MG/L
SYSTOLIC BLOOD PRESSURE - MUSE: NORMAL MMHG
T AXIS - MUSE: 61 DEGREES
TROPONIN T SERPL HS-MCNC: 23 NG/L
TROPONIN T SERPL HS-MCNC: 23 NG/L
UNIT ABO/RH: NORMAL
UNIT NUMBER: NORMAL
UNIT STATUS: NORMAL
UNIT TYPE ISBT: 8400
UROBILINOGEN UR STRIP-MCNC: NORMAL MG/DL
VENTRICULAR RATE- MUSE: 106 BPM
VIT B12 SERPL-MCNC: 1369 PG/ML (ref 232–1245)
WBC # BLD AUTO: 10.3 10E3/UL (ref 4–11)
WBC # BLD AUTO: 11.1 10E3/UL (ref 4–11)
WBC # BLD AUTO: 5.4 10E3/UL (ref 4–11)
WBC # BLD AUTO: 5.4 10E3/UL (ref 4–11)
WBC # BLD AUTO: 6.7 10E3/UL (ref 4–11)
WBC # BLD AUTO: 7.1 10E3/UL (ref 4–11)
WBC # BLD AUTO: 7.5 10E3/UL (ref 4–11)
WBC URINE: 2 /HPF

## 2024-01-01 PROCEDURE — 97110 THERAPEUTIC EXERCISES: CPT | Mod: GP

## 2024-01-01 PROCEDURE — 81456 SO/HL 51/>GSAP RNA ALYS: CPT | Performed by: PHYSICIAN ASSISTANT

## 2024-01-01 PROCEDURE — 99233 SBSQ HOSP IP/OBS HIGH 50: CPT | Performed by: NURSE PRACTITIONER

## 2024-01-01 PROCEDURE — 36415 COLL VENOUS BLD VENIPUNCTURE: CPT | Performed by: STUDENT IN AN ORGANIZED HEALTH CARE EDUCATION/TRAINING PROGRAM

## 2024-01-01 PROCEDURE — 99215 OFFICE O/P EST HI 40 MIN: CPT | Performed by: INTERNAL MEDICINE

## 2024-01-01 PROCEDURE — 250N000013 HC RX MED GY IP 250 OP 250 PS 637: Performed by: STUDENT IN AN ORGANIZED HEALTH CARE EDUCATION/TRAINING PROGRAM

## 2024-01-01 PROCEDURE — 80048 BASIC METABOLIC PNL TOTAL CA: CPT | Performed by: STUDENT IN AN ORGANIZED HEALTH CARE EDUCATION/TRAINING PROGRAM

## 2024-01-01 PROCEDURE — 36415 COLL VENOUS BLD VENIPUNCTURE: CPT

## 2024-01-01 PROCEDURE — 71260 CT THORAX DX C+: CPT

## 2024-01-01 PROCEDURE — 250N000012 HC RX MED GY IP 250 OP 636 PS 637: Performed by: STUDENT IN AN ORGANIZED HEALTH CARE EDUCATION/TRAINING PROGRAM

## 2024-01-01 PROCEDURE — 92014 COMPRE OPH EXAM EST PT 1/>: CPT | Performed by: OPTOMETRIST

## 2024-01-01 PROCEDURE — 99223 1ST HOSP IP/OBS HIGH 75: CPT | Performed by: INTERNAL MEDICINE

## 2024-01-01 PROCEDURE — 83935 ASSAY OF URINE OSMOLALITY: CPT

## 2024-01-01 PROCEDURE — 85025 COMPLETE CBC W/AUTO DIFF WBC: CPT | Performed by: PHYSICIAN ASSISTANT

## 2024-01-01 PROCEDURE — 99232 SBSQ HOSP IP/OBS MODERATE 35: CPT | Performed by: PHYSICIAN ASSISTANT

## 2024-01-01 PROCEDURE — 97530 THERAPEUTIC ACTIVITIES: CPT | Mod: GP | Performed by: PHYSICAL THERAPIST

## 2024-01-01 PROCEDURE — 85025 COMPLETE CBC W/AUTO DIFF WBC: CPT | Performed by: INTERNAL MEDICINE

## 2024-01-01 PROCEDURE — G0180 MD CERTIFICATION HHA PATIENT: HCPCS | Performed by: STUDENT IN AN ORGANIZED HEALTH CARE EDUCATION/TRAINING PROGRAM

## 2024-01-01 PROCEDURE — 83550 IRON BINDING TEST: CPT | Performed by: INTERNAL MEDICINE

## 2024-01-01 PROCEDURE — G0378 HOSPITAL OBSERVATION PER HR: HCPCS

## 2024-01-01 PROCEDURE — 36415 COLL VENOUS BLD VENIPUNCTURE: CPT | Performed by: PHYSICIAN ASSISTANT

## 2024-01-01 PROCEDURE — 88307 TISSUE EXAM BY PATHOLOGIST: CPT | Mod: 26 | Performed by: PATHOLOGY

## 2024-01-01 PROCEDURE — 82607 VITAMIN B-12: CPT | Performed by: INTERNAL MEDICINE

## 2024-01-01 PROCEDURE — 82962 GLUCOSE BLOOD TEST: CPT

## 2024-01-01 PROCEDURE — 83735 ASSAY OF MAGNESIUM: CPT | Performed by: PHYSICIAN ASSISTANT

## 2024-01-01 PROCEDURE — 258N000003 HC RX IP 258 OP 636

## 2024-01-01 PROCEDURE — 80053 COMPREHEN METABOLIC PANEL: CPT | Performed by: INTERNAL MEDICINE

## 2024-01-01 PROCEDURE — 96361 HYDRATE IV INFUSION ADD-ON: CPT

## 2024-01-01 PROCEDURE — 258N000003 HC RX IP 258 OP 636: Performed by: STUDENT IN AN ORGANIZED HEALTH CARE EDUCATION/TRAINING PROGRAM

## 2024-01-01 PROCEDURE — 80048 BASIC METABOLIC PNL TOTAL CA: CPT

## 2024-01-01 PROCEDURE — 99239 HOSP IP/OBS DSCHRG MGMT >30: CPT | Performed by: PHYSICIAN ASSISTANT

## 2024-01-01 PROCEDURE — 84484 ASSAY OF TROPONIN QUANT: CPT | Performed by: STUDENT IN AN ORGANIZED HEALTH CARE EDUCATION/TRAINING PROGRAM

## 2024-01-01 PROCEDURE — 83735 ASSAY OF MAGNESIUM: CPT | Performed by: STUDENT IN AN ORGANIZED HEALTH CARE EDUCATION/TRAINING PROGRAM

## 2024-01-01 PROCEDURE — 81455 SO/HL 51/>GSAP DNA/DNA&RNA: CPT | Performed by: PHYSICIAN ASSISTANT

## 2024-01-01 PROCEDURE — 88342 IMHCHEM/IMCYTCHM 1ST ANTB: CPT | Mod: TC | Performed by: PHYSICIAN ASSISTANT

## 2024-01-01 PROCEDURE — 81001 URINALYSIS AUTO W/SCOPE: CPT | Performed by: STUDENT IN AN ORGANIZED HEALTH CARE EDUCATION/TRAINING PROGRAM

## 2024-01-01 PROCEDURE — 83036 HEMOGLOBIN GLYCOSYLATED A1C: CPT | Performed by: STUDENT IN AN ORGANIZED HEALTH CARE EDUCATION/TRAINING PROGRAM

## 2024-01-01 PROCEDURE — 99442 PR PHYSICIAN TELEPHONE EVALUATION 11-20 MIN: CPT | Mod: 93 | Performed by: NURSE PRACTITIONER

## 2024-01-01 PROCEDURE — 85027 COMPLETE CBC AUTOMATED: CPT

## 2024-01-01 PROCEDURE — 85045 AUTOMATED RETICULOCYTE COUNT: CPT

## 2024-01-01 PROCEDURE — 99223 1ST HOSP IP/OBS HIGH 75: CPT | Performed by: NURSE PRACTITIONER

## 2024-01-01 PROCEDURE — 85610 PROTHROMBIN TIME: CPT | Performed by: PHYSICIAN ASSISTANT

## 2024-01-01 PROCEDURE — P9016 RBC LEUKOCYTES REDUCED: HCPCS

## 2024-01-01 PROCEDURE — 97530 THERAPEUTIC ACTIVITIES: CPT | Mod: GP

## 2024-01-01 PROCEDURE — 86923 COMPATIBILITY TEST ELECTRIC: CPT

## 2024-01-01 PROCEDURE — 99232 SBSQ HOSP IP/OBS MODERATE 35: CPT

## 2024-01-01 PROCEDURE — 36430 TRANSFUSION BLD/BLD COMPNT: CPT

## 2024-01-01 PROCEDURE — 81455 SO/HL 51/>GSAP DNA/DNA&RNA: CPT | Performed by: INTERNAL MEDICINE

## 2024-01-01 PROCEDURE — 83930 ASSAY OF BLOOD OSMOLALITY: CPT

## 2024-01-01 PROCEDURE — 87637 SARSCOV2&INF A&B&RSV AMP PRB: CPT | Performed by: STUDENT IN AN ORGANIZED HEALTH CARE EDUCATION/TRAINING PROGRAM

## 2024-01-01 PROCEDURE — 36415 COLL VENOUS BLD VENIPUNCTURE: CPT | Performed by: INTERNAL MEDICINE

## 2024-01-01 PROCEDURE — 97161 PT EVAL LOW COMPLEX 20 MIN: CPT | Mod: GP

## 2024-01-01 PROCEDURE — 96365 THER/PROPH/DIAG IV INF INIT: CPT | Mod: 59

## 2024-01-01 PROCEDURE — 82378 CARCINOEMBRYONIC ANTIGEN: CPT | Performed by: INTERNAL MEDICINE

## 2024-01-01 PROCEDURE — 83540 ASSAY OF IRON: CPT | Performed by: INTERNAL MEDICINE

## 2024-01-01 PROCEDURE — 84238 ASSAY NONENDOCRINE RECEPTOR: CPT | Performed by: PHYSICIAN ASSISTANT

## 2024-01-01 PROCEDURE — 250N000009 HC RX 250: Performed by: PHYSICIAN ASSISTANT

## 2024-01-01 PROCEDURE — 250N000009 HC RX 250: Performed by: INTERNAL MEDICINE

## 2024-01-01 PROCEDURE — 88342 IMHCHEM/IMCYTCHM 1ST ANTB: CPT | Mod: 26 | Performed by: PATHOLOGY

## 2024-01-01 PROCEDURE — 250N000011 HC RX IP 250 OP 636: Performed by: INTERNAL MEDICINE

## 2024-01-01 PROCEDURE — 82746 ASSAY OF FOLIC ACID SERUM: CPT | Performed by: INTERNAL MEDICINE

## 2024-01-01 PROCEDURE — 99233 SBSQ HOSP IP/OBS HIGH 50: CPT | Performed by: PHYSICIAN ASSISTANT

## 2024-01-01 PROCEDURE — 84300 ASSAY OF URINE SODIUM: CPT

## 2024-01-01 PROCEDURE — 82040 ASSAY OF SERUM ALBUMIN: CPT | Performed by: STUDENT IN AN ORGANIZED HEALTH CARE EDUCATION/TRAINING PROGRAM

## 2024-01-01 PROCEDURE — 97110 THERAPEUTIC EXERCISES: CPT | Mod: GO | Performed by: OCCUPATIONAL THERAPIST

## 2024-01-01 PROCEDURE — G0463 HOSPITAL OUTPT CLINIC VISIT: HCPCS | Performed by: INTERNAL MEDICINE

## 2024-01-01 PROCEDURE — 83735 ASSAY OF MAGNESIUM: CPT

## 2024-01-01 PROCEDURE — 99418 PROLNG IP/OBS E/M EA 15 MIN: CPT | Performed by: NURSE PRACTITIONER

## 2024-01-01 PROCEDURE — 93005 ELECTROCARDIOGRAM TRACING: CPT

## 2024-01-01 PROCEDURE — 84295 ASSAY OF SERUM SODIUM: CPT | Mod: 91

## 2024-01-01 PROCEDURE — 71046 X-RAY EXAM CHEST 2 VIEWS: CPT

## 2024-01-01 PROCEDURE — 82728 ASSAY OF FERRITIN: CPT | Performed by: INTERNAL MEDICINE

## 2024-01-01 PROCEDURE — 84100 ASSAY OF PHOSPHORUS: CPT | Performed by: STUDENT IN AN ORGANIZED HEALTH CARE EDUCATION/TRAINING PROGRAM

## 2024-01-01 PROCEDURE — 250N000011 HC RX IP 250 OP 636: Performed by: STUDENT IN AN ORGANIZED HEALTH CARE EDUCATION/TRAINING PROGRAM

## 2024-01-01 PROCEDURE — 83605 ASSAY OF LACTIC ACID: CPT | Performed by: STUDENT IN AN ORGANIZED HEALTH CARE EDUCATION/TRAINING PROGRAM

## 2024-01-01 PROCEDURE — 85027 COMPLETE CBC AUTOMATED: CPT | Performed by: STUDENT IN AN ORGANIZED HEALTH CARE EDUCATION/TRAINING PROGRAM

## 2024-01-01 PROCEDURE — 92015 DETERMINE REFRACTIVE STATE: CPT | Performed by: OPTOMETRIST

## 2024-01-01 PROCEDURE — 86900 BLOOD TYPING SEROLOGIC ABO: CPT

## 2024-01-01 PROCEDURE — 96125 COGNITIVE TEST BY HC PRO: CPT | Mod: GO | Performed by: OCCUPATIONAL THERAPIST

## 2024-01-01 PROCEDURE — 99285 EMERGENCY DEPT VISIT HI MDM: CPT | Mod: 25

## 2024-01-01 PROCEDURE — 250N000011 HC RX IP 250 OP 636: Performed by: PHYSICIAN ASSISTANT

## 2024-01-01 PROCEDURE — 85025 COMPLETE CBC W/AUTO DIFF WBC: CPT | Performed by: STUDENT IN AN ORGANIZED HEALTH CARE EDUCATION/TRAINING PROGRAM

## 2024-01-01 PROCEDURE — 88341 IMHCHEM/IMCYTCHM EA ADD ANTB: CPT | Mod: 26 | Performed by: PATHOLOGY

## 2024-01-01 PROCEDURE — 99207 PR NO BILLABLE SERVICE THIS VISIT: CPT | Performed by: INTERNAL MEDICINE

## 2024-01-01 PROCEDURE — G0452 MOLECULAR PATHOLOGY INTERPR: HCPCS | Mod: 26 | Performed by: PATHOLOGY

## 2024-01-01 PROCEDURE — 99207 BLOOD MORPHOLOGY PATHOLOGIST REVIEW: CPT | Performed by: PATHOLOGY

## 2024-01-01 PROCEDURE — 272N000431 US BIOPSY LIVER

## 2024-01-01 PROCEDURE — 99223 1ST HOSP IP/OBS HIGH 75: CPT | Performed by: STUDENT IN AN ORGANIZED HEALTH CARE EDUCATION/TRAINING PROGRAM

## 2024-01-01 PROCEDURE — 83615 LACTATE (LD) (LDH) ENZYME: CPT | Performed by: INTERNAL MEDICINE

## 2024-01-01 RX ORDER — INSULIN GLARGINE 100 [IU]/ML
INJECTION, SOLUTION SUBCUTANEOUS
Refills: 0 | OUTPATIENT
Start: 2024-01-01

## 2024-01-01 RX ORDER — NALOXONE HYDROCHLORIDE 0.4 MG/ML
0.2 INJECTION, SOLUTION INTRAMUSCULAR; INTRAVENOUS; SUBCUTANEOUS
Status: DISCONTINUED | OUTPATIENT
Start: 2024-01-01 | End: 2024-01-01 | Stop reason: HOSPADM

## 2024-01-01 RX ORDER — ACYCLOVIR 400 MG/1
1 TABLET ORAL
Qty: 3 EACH | Refills: 5 | Status: SHIPPED | OUTPATIENT
Start: 2024-01-01

## 2024-01-01 RX ORDER — EPINEPHRINE 1 MG/ML
0.3 INJECTION, SOLUTION INTRAMUSCULAR; SUBCUTANEOUS EVERY 5 MIN PRN
Status: CANCELLED | OUTPATIENT
Start: 2024-01-01

## 2024-01-01 RX ORDER — NALOXONE HYDROCHLORIDE 0.4 MG/ML
0.4 INJECTION, SOLUTION INTRAMUSCULAR; INTRAVENOUS; SUBCUTANEOUS
Status: DISCONTINUED | OUTPATIENT
Start: 2024-01-01 | End: 2024-01-01 | Stop reason: HOSPADM

## 2024-01-01 RX ORDER — ACETAMINOPHEN 325 MG/1
650 TABLET ORAL EVERY 4 HOURS PRN
Status: DISCONTINUED | OUTPATIENT
Start: 2024-01-01 | End: 2024-01-01 | Stop reason: HOSPADM

## 2024-01-01 RX ORDER — ROSUVASTATIN CALCIUM 20 MG/1
20 TABLET, COATED ORAL DAILY
Status: DISCONTINUED | OUTPATIENT
Start: 2024-01-01 | End: 2024-01-01 | Stop reason: HOSPADM

## 2024-01-01 RX ORDER — PANTOPRAZOLE SODIUM 40 MG/1
40 TABLET, DELAYED RELEASE ORAL DAILY
Status: DISCONTINUED | OUTPATIENT
Start: 2024-01-01 | End: 2024-01-01 | Stop reason: HOSPADM

## 2024-01-01 RX ORDER — AMOXICILLIN 250 MG
1 CAPSULE ORAL 2 TIMES DAILY PRN
Status: DISCONTINUED | OUTPATIENT
Start: 2024-01-01 | End: 2024-01-01 | Stop reason: HOSPADM

## 2024-01-01 RX ORDER — PREDNISONE 2.5 MG/1
TABLET ORAL DAILY
COMMUNITY
End: 2024-01-01

## 2024-01-01 RX ORDER — METOPROLOL SUCCINATE 100 MG/1
50 TABLET, EXTENDED RELEASE ORAL DAILY
COMMUNITY

## 2024-01-01 RX ORDER — METHYLPREDNISOLONE SODIUM SUCCINATE 125 MG/2ML
125 INJECTION, POWDER, LYOPHILIZED, FOR SOLUTION INTRAMUSCULAR; INTRAVENOUS
Status: CANCELLED
Start: 2024-01-01

## 2024-01-01 RX ORDER — ALBUTEROL SULFATE 90 UG/1
1-2 AEROSOL, METERED RESPIRATORY (INHALATION)
Status: CANCELLED
Start: 2024-01-01

## 2024-01-01 RX ORDER — MEPERIDINE HYDROCHLORIDE 25 MG/ML
25 INJECTION INTRAMUSCULAR; INTRAVENOUS; SUBCUTANEOUS EVERY 30 MIN PRN
Status: CANCELLED | OUTPATIENT
Start: 2024-01-01

## 2024-01-01 RX ORDER — DIPHENHYDRAMINE HYDROCHLORIDE 50 MG/ML
50 INJECTION INTRAMUSCULAR; INTRAVENOUS
Status: CANCELLED
Start: 2024-01-01

## 2024-01-01 RX ORDER — SODIUM CHLORIDE 9 MG/ML
INJECTION, SOLUTION INTRAVENOUS CONTINUOUS
Status: DISCONTINUED | OUTPATIENT
Start: 2024-01-01 | End: 2024-01-01

## 2024-01-01 RX ORDER — ONDANSETRON 8 MG/1
8 TABLET, FILM COATED ORAL EVERY 8 HOURS PRN
Qty: 30 TABLET | Refills: 2 | Status: SHIPPED | OUTPATIENT
Start: 2024-01-01

## 2024-01-01 RX ORDER — DEXAMETHASONE 4 MG/1
8 TABLET ORAL DAILY
Qty: 4 TABLET | Refills: 2 | Status: SHIPPED | OUTPATIENT
Start: 2024-01-01

## 2024-01-01 RX ORDER — METOPROLOL SUCCINATE 50 MG/1
50 TABLET, EXTENDED RELEASE ORAL DAILY
Status: DISCONTINUED | OUTPATIENT
Start: 2024-01-01 | End: 2024-01-01 | Stop reason: HOSPADM

## 2024-01-01 RX ORDER — ONDANSETRON 2 MG/ML
8 INJECTION INTRAMUSCULAR; INTRAVENOUS ONCE
Status: CANCELLED | OUTPATIENT
Start: 2024-01-01

## 2024-01-01 RX ORDER — ASPIRIN 81 MG/1
81 TABLET ORAL DAILY
Status: DISCONTINUED | OUTPATIENT
Start: 2024-01-01 | End: 2024-01-01 | Stop reason: HOSPADM

## 2024-01-01 RX ORDER — ASPIRIN 81 MG/1
81 TABLET ORAL DAILY
COMMUNITY
Start: 2024-01-01

## 2024-01-01 RX ORDER — AMOXICILLIN 250 MG
2 CAPSULE ORAL 2 TIMES DAILY PRN
Status: DISCONTINUED | OUTPATIENT
Start: 2024-01-01 | End: 2024-01-01 | Stop reason: HOSPADM

## 2024-01-01 RX ORDER — ONDANSETRON 4 MG/1
4 TABLET, ORALLY DISINTEGRATING ORAL EVERY 6 HOURS PRN
Status: DISCONTINUED | OUTPATIENT
Start: 2024-01-01 | End: 2024-01-01 | Stop reason: HOSPADM

## 2024-01-01 RX ORDER — LEFLUNOMIDE 10 MG/1
10 TABLET ORAL DAILY
COMMUNITY

## 2024-01-01 RX ORDER — NICOTINE POLACRILEX 4 MG
15-30 LOZENGE BUCCAL
Status: DISCONTINUED | OUTPATIENT
Start: 2024-01-01 | End: 2024-01-01 | Stop reason: HOSPADM

## 2024-01-01 RX ORDER — HEPARIN SODIUM (PORCINE) LOCK FLUSH IV SOLN 100 UNIT/ML 100 UNIT/ML
5 SOLUTION INTRAVENOUS
Status: CANCELLED | OUTPATIENT
Start: 2024-01-01

## 2024-01-01 RX ORDER — DEXTROSE MONOHYDRATE 25 G/50ML
25-50 INJECTION, SOLUTION INTRAVENOUS
Status: DISCONTINUED | OUTPATIENT
Start: 2024-01-01 | End: 2024-01-01 | Stop reason: HOSPADM

## 2024-01-01 RX ORDER — PREDNISONE 5 MG/1
TABLET ORAL
COMMUNITY
Start: 2024-01-01

## 2024-01-01 RX ORDER — LORAZEPAM 2 MG/ML
0.5 INJECTION INTRAMUSCULAR EVERY 4 HOURS PRN
Status: CANCELLED | OUTPATIENT
Start: 2024-01-01

## 2024-01-01 RX ORDER — PROCHLORPERAZINE MALEATE 10 MG
5 TABLET ORAL EVERY 6 HOURS PRN
Qty: 30 TABLET | Refills: 2 | Status: SHIPPED | OUTPATIENT
Start: 2024-01-01

## 2024-01-01 RX ORDER — ONDANSETRON 2 MG/ML
4 INJECTION INTRAMUSCULAR; INTRAVENOUS EVERY 6 HOURS PRN
Status: DISCONTINUED | OUTPATIENT
Start: 2024-01-01 | End: 2024-01-01 | Stop reason: HOSPADM

## 2024-01-01 RX ORDER — ALBUTEROL SULFATE 0.83 MG/ML
2.5 SOLUTION RESPIRATORY (INHALATION)
Status: CANCELLED | OUTPATIENT
Start: 2024-01-01

## 2024-01-01 RX ORDER — HEPARIN SODIUM,PORCINE 10 UNIT/ML
5-20 VIAL (ML) INTRAVENOUS DAILY PRN
Status: CANCELLED | OUTPATIENT
Start: 2024-01-01

## 2024-01-01 RX ORDER — MAGNESIUM SULFATE HEPTAHYDRATE 40 MG/ML
2 INJECTION, SOLUTION INTRAVENOUS ONCE
Status: COMPLETED | OUTPATIENT
Start: 2024-01-01 | End: 2024-01-01

## 2024-01-01 RX ORDER — IOPAMIDOL 755 MG/ML
500 INJECTION, SOLUTION INTRAVASCULAR ONCE
Status: COMPLETED | OUTPATIENT
Start: 2024-01-01 | End: 2024-01-01

## 2024-01-01 RX ORDER — FLUMAZENIL 0.1 MG/ML
0.2 INJECTION, SOLUTION INTRAVENOUS
Status: DISCONTINUED | OUTPATIENT
Start: 2024-01-01 | End: 2024-01-01 | Stop reason: HOSPADM

## 2024-01-01 RX ORDER — IOPAMIDOL 755 MG/ML
500 INJECTION, SOLUTION INTRAVASCULAR ONCE
Status: DISCONTINUED | OUTPATIENT
Start: 2024-01-01 | End: 2024-01-01

## 2024-01-01 RX ORDER — FENTANYL CITRATE 50 UG/ML
25-50 INJECTION, SOLUTION INTRAMUSCULAR; INTRAVENOUS EVERY 5 MIN PRN
Status: DISCONTINUED | OUTPATIENT
Start: 2024-01-01 | End: 2024-01-01 | Stop reason: HOSPADM

## 2024-01-01 RX ORDER — LIDOCAINE/PRILOCAINE 2.5 %-2.5%
CREAM (GRAM) TOPICAL PRN
Qty: 30 G | Refills: 1 | Status: SHIPPED | OUTPATIENT
Start: 2024-01-01

## 2024-01-01 RX ADMIN — PANTOPRAZOLE SODIUM 40 MG: 40 TABLET, DELAYED RELEASE ORAL at 08:40

## 2024-01-01 RX ADMIN — ASPIRIN 81 MG: 81 TABLET, COATED ORAL at 08:41

## 2024-01-01 RX ADMIN — PANTOPRAZOLE SODIUM 40 MG: 40 TABLET, DELAYED RELEASE ORAL at 08:21

## 2024-01-01 RX ADMIN — INSULIN GLARGINE 10 UNITS: 100 INJECTION, SOLUTION SUBCUTANEOUS at 08:38

## 2024-01-01 RX ADMIN — INSULIN GLARGINE 10 UNITS: 100 INJECTION, SOLUTION SUBCUTANEOUS at 08:36

## 2024-01-01 RX ADMIN — INSULIN GLARGINE 10 UNITS: 100 INJECTION, SOLUTION SUBCUTANEOUS at 08:27

## 2024-01-01 RX ADMIN — SODIUM CHLORIDE: 9 INJECTION, SOLUTION INTRAVENOUS at 00:21

## 2024-01-01 RX ADMIN — LIDOCAINE HYDROCHLORIDE 10 ML: 10 INJECTION, SOLUTION EPIDURAL; INFILTRATION; INTRACAUDAL; PERINEURAL at 14:37

## 2024-01-01 RX ADMIN — PREDNISONE 7.5 MG: 5 TABLET ORAL at 08:36

## 2024-01-01 RX ADMIN — ROSUVASTATIN CALCIUM 20 MG: 20 TABLET, FILM COATED ORAL at 08:37

## 2024-01-01 RX ADMIN — INSULIN ASPART 1 UNITS: 100 INJECTION, SOLUTION INTRAVENOUS; SUBCUTANEOUS at 22:14

## 2024-01-01 RX ADMIN — ROSUVASTATIN CALCIUM 20 MG: 20 TABLET, FILM COATED ORAL at 09:06

## 2024-01-01 RX ADMIN — PREDNISONE 7.5 MG: 5 TABLET ORAL at 08:21

## 2024-01-01 RX ADMIN — PANTOPRAZOLE SODIUM 40 MG: 40 TABLET, DELAYED RELEASE ORAL at 09:07

## 2024-01-01 RX ADMIN — IOPAMIDOL 90 ML: 755 INJECTION, SOLUTION INTRAVENOUS at 10:20

## 2024-01-01 RX ADMIN — MIDAZOLAM 1 MG: 1 INJECTION INTRAMUSCULAR; INTRAVENOUS at 14:34

## 2024-01-01 RX ADMIN — FENTANYL CITRATE 50 MCG: 0.05 INJECTION, SOLUTION INTRAMUSCULAR; INTRAVENOUS at 14:34

## 2024-01-01 RX ADMIN — MAGNESIUM SULFATE HEPTAHYDRATE 2 G: 2 INJECTION, SOLUTION INTRAVENOUS at 20:03

## 2024-01-01 RX ADMIN — ROSUVASTATIN CALCIUM 20 MG: 20 TABLET, FILM COATED ORAL at 08:21

## 2024-01-01 RX ADMIN — METOPROLOL SUCCINATE 50 MG: 50 TABLET, EXTENDED RELEASE ORAL at 08:40

## 2024-01-01 RX ADMIN — ROSUVASTATIN CALCIUM 20 MG: 20 TABLET, FILM COATED ORAL at 08:41

## 2024-01-01 RX ADMIN — INSULIN GLARGINE 10 UNITS: 100 INJECTION, SOLUTION SUBCUTANEOUS at 09:12

## 2024-01-01 RX ADMIN — SODIUM CHLORIDE: 9 INJECTION, SOLUTION INTRAVENOUS at 01:13

## 2024-01-01 RX ADMIN — PREDNISONE 7.5 MG: 5 TABLET ORAL at 08:40

## 2024-01-01 RX ADMIN — INSULIN ASPART 1 UNITS: 100 INJECTION, SOLUTION INTRAVENOUS; SUBCUTANEOUS at 22:13

## 2024-01-01 RX ADMIN — ASPIRIN 81 MG: 81 TABLET, COATED ORAL at 09:06

## 2024-01-01 RX ADMIN — PANTOPRAZOLE SODIUM 40 MG: 40 TABLET, DELAYED RELEASE ORAL at 08:36

## 2024-01-01 RX ADMIN — PREDNISONE 7.5 MG: 5 TABLET ORAL at 09:06

## 2024-01-01 RX ADMIN — METOPROLOL SUCCINATE 50 MG: 50 TABLET, EXTENDED RELEASE ORAL at 08:37

## 2024-01-01 RX ADMIN — METOPROLOL SUCCINATE 50 MG: 50 TABLET, EXTENDED RELEASE ORAL at 08:21

## 2024-01-01 RX ADMIN — SODIUM CHLORIDE 1000 ML: 9 INJECTION, SOLUTION INTRAVENOUS at 20:49

## 2024-01-01 RX ADMIN — SODIUM CHLORIDE 62 ML: 9 INJECTION, SOLUTION INTRAVENOUS at 10:33

## 2024-01-01 RX ADMIN — INSULIN ASPART 1 UNITS: 100 INJECTION, SOLUTION INTRAVENOUS; SUBCUTANEOUS at 21:59

## 2024-01-01 ASSESSMENT — KERATOMETRY
OS_K1POWER_DIOPTERS: 43.50
OD_K1POWER_DIOPTERS: 43.50
OD_AXISANGLE2_DEGREES: 165
OD_K2POWER_DIOPTERS: 45.25
OS_AXISANGLE2_DEGREES: 006
OS_AXISANGLE_DEGREES: 096
OD_AXISANGLE_DEGREES: 075
OS_K2POWER_DIOPTERS: 45.87

## 2024-01-01 ASSESSMENT — REFRACTION_MANIFEST
OS_AXIS: 103
OS_CYLINDER: +1.00
OS_AXIS: 110
OD_AXIS: 084
OD_CYLINDER: +0.50
OS_ADD: +2.50
OS_CYLINDER: +2.25
METHOD_AUTOREFRACTION: 1
OD_SPHERE: -2.25
OD_SPHERE: -0.50
OD_ADD: +2.50
OS_SPHERE: -2.25
OD_CYLINDER: +0.75
OS_SPHERE: -3.50
OD_AXIS: 090

## 2024-01-01 ASSESSMENT — EXTERNAL EXAM - LEFT EYE: OS_EXAM: NORMAL

## 2024-01-01 ASSESSMENT — ACTIVITIES OF DAILY LIVING (ADL)
ADLS_ACUITY_SCORE: 33
ADLS_ACUITY_SCORE: 35
ADLS_ACUITY_SCORE: 33
ADLS_ACUITY_SCORE: 35
ADLS_ACUITY_SCORE: 33
ADLS_ACUITY_SCORE: 33
ADLS_ACUITY_SCORE: 35
ADLS_ACUITY_SCORE: 35
ADLS_ACUITY_SCORE: 36
ADLS_ACUITY_SCORE: 35
ADLS_ACUITY_SCORE: 33
ADLS_ACUITY_SCORE: 35
ADLS_ACUITY_SCORE: 33
ADLS_ACUITY_SCORE: 35
ADLS_ACUITY_SCORE: 35
ADLS_ACUITY_SCORE: 33
ADLS_ACUITY_SCORE: 35
ADLS_ACUITY_SCORE: 33
ADLS_ACUITY_SCORE: 35
ADLS_ACUITY_SCORE: 42
ADLS_ACUITY_SCORE: 35
ADLS_ACUITY_SCORE: 35
ADLS_ACUITY_SCORE: 33
ADLS_ACUITY_SCORE: 35
ADLS_ACUITY_SCORE: 35
ADLS_ACUITY_SCORE: 33
ADLS_ACUITY_SCORE: 35

## 2024-01-01 ASSESSMENT — CONF VISUAL FIELD
OS_INFERIOR_NASAL_RESTRICTION: 0
OS_INFERIOR_TEMPORAL_RESTRICTION: 0
OD_INFERIOR_TEMPORAL_RESTRICTION: 0
OD_NORMAL: 1
METHOD: COUNTING FINGERS
OS_NORMAL: 1
OD_SUPERIOR_NASAL_RESTRICTION: 0
OS_SUPERIOR_NASAL_RESTRICTION: 0
OD_INFERIOR_NASAL_RESTRICTION: 0
OS_SUPERIOR_TEMPORAL_RESTRICTION: 0
OD_SUPERIOR_TEMPORAL_RESTRICTION: 0

## 2024-01-01 ASSESSMENT — REFRACTION_WEARINGRX
OD_ADD: +2.50
OS_AXIS: 126
OS_SPHERE: -2.50
OS_CYLINDER: +2.00
OD_CYLINDER: +2.00
SPECS_TYPE: PAL
OS_ADD: +2.50
OD_AXIS: 107
OD_SPHERE: -3.00

## 2024-01-01 ASSESSMENT — VISUAL ACUITY
CORRECTION_TYPE: GLASSES
OS_CC: 20/200
METHOD: SNELLEN - LINEAR
OD_CC: 20/50
OD_CC: 20/400
OS_CC: 20/40

## 2024-01-01 ASSESSMENT — EXTERNAL EXAM - RIGHT EYE: OD_EXAM: NORMAL

## 2024-01-01 ASSESSMENT — TONOMETRY
OD_IOP_MMHG: 13
OS_IOP_MMHG: 14
IOP_METHOD: APPLANATION

## 2024-01-01 ASSESSMENT — SLIT LAMP EXAM - LIDS: COMMENTS: BLEPHARITIS, DERMATOCHALASIS

## 2024-01-01 ASSESSMENT — CUP TO DISC RATIO
OS_RATIO: 0.3
OD_RATIO: 0.5

## 2024-01-01 ASSESSMENT — PAIN SCALES - GENERAL: PAINLEVEL: MILD PAIN (2)

## 2024-01-02 NOTE — TELEPHONE ENCOUNTER
Please review pt's MC message & advise. Thanks.    SHAMIR Jauregui  Patient Advocate Liason (PAL)  [facility]  Ph. [phone] / Fax. [phone]

## 2024-01-02 NOTE — TELEPHONE ENCOUNTER
Order/Referral Request    Who is requesting: Home Care    Orders being requested: OT for skilled nursing 1x a week for 4 weeks, 1x a month for 1 month.  PT and LEIDY evaluate and treat. Asking for an OK to add tylenol and Flonase, metamucil gummies daily    Reason service is needed/diagnosis: Home Care Request    When are orders needed by: as soon as possible    Has this been discussed with Provider: Yes    Does patient have a preference on a Group/Provider/Facility? [facility]    Does patient have an appointment scheduled?: No    Where to send orders:  Call with Verbal  [phone]     Could we send this information to you in [place] or would you prefer to receive a phone call?:   No preference   Okay to leave a detailed message?: Yes at Other phone number:  [phone]

## 2024-01-02 NOTE — TELEPHONE ENCOUNTER
Called [facility] back at [phone] & provided verbal orders as per 's recommendation.    SHAMIR Jauregui  Patient Advocate Liason (PAL)  [facility]  Ph. [phone] / Fax. [phone]

## 2024-01-02 NOTE — TELEPHONE ENCOUNTER
Esahart message sent to patient. Ok/safe to take vitamin B1.    Luma Kauffman MD  Internal Medicine & Pediatrics  [facility]  She/her

## 2024-01-02 NOTE — TELEPHONE ENCOUNTER
Ok to provide verbal orders for:  -OT for skilled nursing 1x a week for 4 weeks, 1x a month for 1 month  -PT and LEIDY evaluate and treat.  -OK to add tylenol and Flonase, metamucil gummies daily       Luma Kauffman MD  Internal Medicine & Pediatrics  [facility]  She/her

## 2024-01-03 NOTE — TELEPHONE ENCOUNTER
Verbal orders provided to delay OT SOC due to pt preference    Agapito Weathers RN on 1/3/2024 at 5:01 PM

## 2024-01-10 NOTE — TELEPHONE ENCOUNTER
See the  message from pt.     Called [facility] OT(Siomara) at [phone] to get details. PT & OT did an eval on 1/5. Because of pt is no longer home bound(driving), he will be discharged from  next week. This was discussed with pt & spouse, they agreed to switch to out-pt PT & OT.     Siomara(OT) wanted to bring to 's attention that pt has cognitive impairment which concerns her. So, he might need cognitive assessment done through OT/speech and until then he shouldn't be driving which might make pt upset(her recommendation to ).     Called pt to get details on his complain regarding not making progress in health. Cuong to speak with spouse(Marcie). She says that he is recovering well from the recent fall. But still has increased fatigue & balance issues. Has been using cane 80% of the time inside the house & 100% of the time outside the house which has been very helpful.      - Pended OT & PT. Please review & sign if appropriate. Thanks.     SHAMIR Jauregui  Patient Advocate Liason (PAL)  [facility]  Ph. [phone] / Fax. [phone]

## 2024-01-11 NOTE — TELEPHONE ENCOUNTER
Physical therapy and occupational therapy signed.      Luma Kauffman MD  Internal Medicine & Pediatrics  [facility]  She/her

## 2024-01-12 NOTE — TELEPHONE ENCOUNTER
See the MC message from fernando Jauregui RN  Patient Advocate Liason (PAL)  [facility]  Ph. [phone] / Fax. [phone]

## 2024-01-12 NOTE — TELEPHONE ENCOUNTER
See the  message. Sent a rx for insulin pen needles to the requested pharmacy.    SHAMIR Jauregui  Patient Advocate Liason (PAL)  [facility]  Ph. [phone] / Fax. [phone]

## 2024-01-16 NOTE — TELEPHONE ENCOUNTER
Prednisone may be causing more GERD/heartburn.    Recommend increase PPI to BID dosing. Should be taking morning dose about 20-30 minutes before eat/drink anything. I can send in prescription to reflect this to [facility] if patient needs.    Metformin should be getting absorbed as colon was removed, not small intestine.    Luma Kauffman MD  Internal Medicine & Pediatrics  [facility]  She/her

## 2024-01-16 NOTE — PROGRESS NOTES
OCCUPATIONAL THERAPY EVALUATION  Type of Visit: Evaluation    See electronic medical record for Abuse and Falls Screening details.    Subjective      Presenting condition or subjective complaint:    Date of onset: 01/11/24 (date of orders)    Relevant medical history:     Dates & types of surgery:      Prior diagnostic imaging/testing results:       Prior therapy history for the same diagnosis, illness or injury:        Prior Level of Function  Transfers: Independent  Ambulation: Independent  ADL: Independent  IADL: Driving, Finances    Living Environment  Social support:     Type of home:     Stairs to enter the home:         Ramp:     Stairs inside the home:         Help at home:    Equipment owned:       Employment:      Hobbies/Interests:      Patient goals for therapy:      Pain assessment: Pain denied     Objective     Cognitive Status Examination  Orientation: Person, Place unable to state date and day of the week  Level of Consciousness: Alert  Follows Commands and Answers Questions: 100% of the time  Personal Safety and Judgement: Intact  Memory: ImpairedPt recalled 0/5 novel words after a delay  Attention: No deficits identified  Organization/Problem Solving: Reports problems with organization  Executive Function: No deficits identified    VISUAL SKILLS  Visual Acuity: Wears glasses  Visual Field: Appears normal  Visual Attention: Appears normal  Oculomotor:     SENSATION:     POSTURE:   RANGE OF MOTION: UE AROM WFL  STRENGTH: UE Strength WFL  MUSCLE TONE:   COORDINATION: see 9hpt in flowsheet bnl fmc  BALANCE: Pt uses a cane at evaluation    FUNCTIONAL MOBILITY  Assistive Device(s): Cane (single end)    Pt may need extra time to complete self cares.    BATHING: SBA  Equipment:     UPPER BODY DRESSING: Independent  Equipment:     LOWER BODY DRESSING: Independent  Equipment:     TOILETING: Independent  Equipment:     GROOMING: Independent  Equipment:     EATING/SELF FEEDING: Independent   Equipment:      ACTIVITY TOLERANCE:     INSTRUMENTAL ACTIVITIES OF DAILY LIVING (IADL): Pt has assistance from spouse as needed to address IADL tasks          Assessment & Plan   CLINICAL IMPRESSIONS  Medical Diagnosis: Cognitive Impairment    Treatment Diagnosis: decreased ADLS IADLs    Impression/Assessment: Pt is a 83 year old male presenting to Occupational Therapy due to Cognitive Decline.  The following significant findings have been identified: Impaired balance, Impaired cognition, and Impaired coordination.  These identified deficits interfere with their ability to perform household chores, driving , medication management, financial management, and meal planning and preparation as compared to previous level of function.     Clinical Decision Making (Complexity):  Assessment of Occupational Performance: mild complexity  Occupational Performance Limitations: bathing/showering, driving and community mobility, home establishment and management, and social participation  Clinical Decision Making (Complexity): Moderate complexity    PLAN OF CARE  Treatment Interventions:  Interventions: Self-Care/Home Management, Therapeutic Exercise    Long Term Goals   OT Goal 1  Goal Identifier: HEP  Goal Description: Pt will demonstrate good follow through at home of a B UE HEP for strength  and  coordination with SBA and min cues to increase functional strength and coordination for ADLs/IADLS (manipulating buttons, zippers, typing skills, etc.)  Target Date: 03/11/24  OT Goal 2  Goal Identifier: Memory strategies  Goal Description: Pt will complete numerical reasoning,  complex prob solving, and  short-term memory tasks (using compensatory strategies prn) in the appropriate amount of time and with 90% accuracy to improve problem solving ability and increase safety and independence with ADL/IADLs [place] and in the community  Target Date: 03/11/24  OT Goal 3  Goal Identifier: CPT home safety  Goal Description: Patient and/or family to  verbalize understanding of Cognitive Performance Test results and identify 3 strategies to increase patient's safety and independence in the home setting.  Target Date: 03/11/24  OT Goal 4  Goal Identifier: Safety with community mobility  Goal Description: Patient will demonstrate WFLs visual scanning (organized scanning pattern), visual reaction time and divided attention skills using compensatory strategies prn, for safe independent community mobility (crossing the street, driving etc.) and increased safety with ADL/IADLs at home by scoring WNLs on all modes of the [employer] and pen/paper scanning tasks.  Target Date: 03/11/24      Frequency of Treatment: 1x/week  Duration of Treatment: 56 days     Recommended Referrals to Other Professionals:   Education Assessment: Learner/Method: Patient;Family;Significant Other  Education Comments: Pt instructed in role of OT, plan of care and goals     Risks and benefits of evaluation/treatment have been explained.   Patient/Family/caregiver agrees with Plan of Care.     Evaluation Time:            Signing Clinician: Claudette Maddox OT      [facility]                                                                                   OUTPATIENT OCCUPATIONAL THERAPY      PLAN OF TREATMENT FOR OUTPATIENT REHABILITATION   Patient's Last Name, First Name, Austin Garces YOB: 1940   Provider's Name   [facility]   Medical Record No.  [id number]     Onset Date: 01/11/24 (date of orders) Start of Care Date: 01/15/24     Medical Diagnosis:  Cognitive Impairment      OT Treatment Diagnosis:  decreased ADLS IADLs Plan of Treatment  Frequency/Duration:1x/week/56 days    Certification date from 01/15/24   To 03/11/24        See note for plan of treatment details and functional goals     Claudette Maddox OT                         I CERTIFY THE NEED FOR THESE SERVICES FURNISHED UNDER        THIS PLAN OF  TREATMENT AND WHILE UNDER MY CARE     (Physician attestation of this document indicates review and certification of the therapy plan).              Referring Provider:  Luma Kauffman    Initial Assessment  See Epic Evaluation- 01/15/24

## 2024-01-16 NOTE — PROGRESS NOTES
Chief Complaint   Patient presents with    Diabetic Eye Exam     Accompanied by wife  Can not read out of these glasses , purchased elsewhere  Older pair are better   Chief Complaint(s) and History of Present Illness(es)       Diabetic Eye Exam                    Lab Results   Component Value Date    A1C 7.1 07/07/2023    A1C 6.4 08/02/2022    A1C 6.1 06/22/2022    A1C 6.2 02/15/2022    A1C 7.0 07/30/2021    A1C 5.9 04/05/2021    A1C 6.7 01/15/2021    A1C 5.6 08/23/2020    A1C 5.5 07/14/2020    A1C 6.3 12/31/2019     Has an A1C on order / lab appointment tomorrow       Last Eye Exam: Jan 2023  Dilated Previously: Yes, side effects of dilation explained today    What are you currently using to see?  Glasses    Patient has been at [facility] for last exam    Also saw Shameka Grey for central serous each eye , no treatment     Distance Vision Acuity: Noticed gradual change in both eyes    Near Vision Acuity: Not satisfied     Eye Comfort: good  Do you use eye drops? : No  Occupation or Hobbies: retired    Jewels Escobar, OA        Medical, surgical and family histories reviewed and updated 1/16/2024.     Recent history of    No change in vision noted, not having any problems seeing things in his daily life    OBJECTIVE: See Ophthalmology exam    ASSESSMENT:    ICD-10-CM    1. Myopia of both eyes with astigmatism  H52.13     H52.203       2. Diabetes mellitus without ophthalmic manifestations (H)  E11.9       3. Presbyopia  H52.4       4. Mixed type age-related cataract, both eyes  H25.813       History of central serous , resolving     PLAN:  Update prescription   Ongoing glucose control   Following with retina in the spring for    They can go one year from that retina visit for another dilation with me, if glucose is holding or lower than last A1C, and if no vision problems   Austin Garza aware  eye exam results will be sent to Luma Kauffman.      Cristina Allen OD

## 2024-01-16 NOTE — LETTER
1/16/2024         RE: Austin Garza  [address] [place] [address]        Dear Colleague,    Thank you for referring your patient, Austin Garza, to the [facility]. Please see a copy of my visit note below.    Chief Complaint   Patient presents with     Diabetic Eye Exam     Accompanied by wife  Can not read out of these glasses , purchased elsewhere  Older pair are better   Chief Complaint(s) and History of Present Illness(es)       Diabetic Eye Exam                    Lab Results   Component Value Date    A1C 7.1 07/07/2023    A1C 6.4 08/02/2022    A1C 6.1 06/22/2022    A1C 6.2 02/15/2022    A1C 7.0 07/30/2021    A1C 5.9 04/05/2021    A1C 6.7 01/15/2021    A1C 5.6 08/23/2020    A1C 5.5 07/14/2020    A1C 6.3 12/31/2019     Has an A1C on order / lab appointment tomorrow       Last Eye Exam: Jan 2023  Dilated Previously: Yes, side effects of dilation explained today    What are you currently using to see?  Glasses    Patient has been at [facility] last exam    Also saw Shameka Grey for central serous each eye , no treatment     Distance Vision Acuity: Noticed gradual change in both eyes    Near Vision Acuity: Not satisfied     Eye Comfort: good  Do you use eye drops? : No  Occupation or Hobbies: retired    Jewels Escobar, HEMALATHA        Medical, surgical and family histories reviewed and updated 1/16/2024.     Recent history of    No change in vision noted, not having any problems seeing things in his daily life    OBJECTIVE: See Ophthalmology exam    ASSESSMENT:    ICD-10-CM    1. Myopia of both eyes with astigmatism  H52.13     H52.203       2. Diabetes mellitus without ophthalmic manifestations (H)  E11.9       3. Presbyopia  H52.4       4. Mixed type age-related cataract, both eyes  H25.813       History of central serous , resolving     PLAN:  Update prescription   Ongoing glucose control   Following with retina in the spring for    They can go one year from that  retina visit for another dilation with me, if glucose is holding or lower than last A1C, and if no vision problems   Austin Garza aware  eye exam results will be sent to Luma Kauffman.      Cristina Allen OD     Again, thank you for allowing me to participate in the care of your patient.        Sincerely,        Cristina Allen, OD

## 2024-01-16 NOTE — PATIENT INSTRUCTIONS
Patient Education   Diabetes weakens the blood vessels all over the body, including the eyes. Damage to the blood vessels in the eyes can cause swelling or bleeding into part of the eye (called the retina). This is called diabetic retinopathy (ADITI-tin--pu-thee). If not treated, this disease can cause vision loss or blindness.   Symptoms may include blurred or distorted vision, but many people have no symptoms. It's important to see your eye doctor regularly to check for problems.   Early treatment and good control can help protect your vision. Here are the things you can do to help prevent vision loss:      1. Keep your blood sugar levels under tight control.      2. Bring high blood pressure under control.      3. No smoking.      4. Have yearly dilated eye exams.    No retinopathy from diabetes   Cataracts   Minimal vision change from last visit

## 2024-01-16 NOTE — TELEPHONE ENCOUNTER
See the MC message from pt. Sent MC message requesting clarification.     SHAMIR Jauregui  Patient Advocate Liason (PAL)  [facility]  Ph. [phone] / Fax. [phone]

## 2024-01-16 NOTE — PROGRESS NOTES
Cognitive Performance Test    SUMMARY OF TEST:    The Cognitive Performance Test (CPT) is a standardized performance-based assessment to measure working memory/executive function processing capacities that underlie functional performance. Subtasks include common basic and instrumental activities of daily living (ADL/IADL) which are rated based on the manner in which patients respond to task demands of varying complexity. The total CPT score describes a level of functioning that indicates how information is processed, implications for functional activities, potential safety risks and a recommended level of supervision or assist based on cognitive function. The highest total score on this test is in the range of 5.6 to 5.8.    DATE OF TESTIN/15/2024    RESULTS OF TESTING:                                                                                         CPT Subtest Results    MEDBOX: 4. SHOP/GLOVES:  PHONE:    WASH:   TRAVEL:  TOAST:    DRESS:    TOTAL CPT SCORE:  30.5/33     Average CPT Score  5.08/5.5    INTERPRETATION OF TEST RESULTS:    Based on the Cognitive Performance Test, this patient scored at CPT Level 5.0.  See CPT Levels reference below.    Summary of functional cognitive status:   Mild functional decline, start of difficulty with complex tasks-Shows start of decline in memory, keeping track of details, decision-making, planning insight and organization.  Can learn new information.     Factors affecting performance:  No additional problems noted    Recommendations:    Supervision in living setting:  Weekly checks                                                       TIME ADMINISTERING TEST: 80 min    TIME FOR INTERPRETATION AND PREPARATION OF REPORT: 10 min    TOTAL TIME: 90 min      CPT Levels Reference:    Patient's Average CPT Score:  5.08                                                                                                                                          "         Individual scores range along a continuum as outlined below.  In addition to cognitive status, other factors may affect safety in a home environment.  Please refer to specific recommendations for this patient.    ___5.6-5.8  Normal functioning (absence of cognitive-functional disability).  Independent in managing personal affairs, monitors and directs own behavior.  Uses complex information to carry out daily activities with safety and accuracy.    Proficient with instrumental activities of daily living (IADL) and learning new activity.  Problems are anticipated, errors are avoided, and consequences of actions are considered.      _x__5.0   Mild cognitive-functional disability; deficits in working memory and executive thought processes. Difficulty using complex information. Problems may be observed with recent memory, judgment, reasoning and planning ahead. May be impulsive or have difficulty anticipating consequences.  Safety:  May require assistance to plan ahead; or to manage complex medication schedules, appointments or finances.  Hazardous activities may need to be monitored or limited.  ADL:  Mild functional decline.  Able to complete basic self-care and routine household tasks.  May have difficulty with complex daily tasks such as reading, writing, meal preparation, shopping or driving.   Learns through hands on teaching. Self-centered behavior or difficulty considering the needs of others may be seen related to trouble seeing the  whole picture\". Can appear disorganized or uninhibited.    ___4.5  Mild to moderate cognitive-functional disability. Significant deficits in working memory and executive thought processes. Judgment, reasoning and planning show obvious impairment.  Distractible with inability to shift attention/actions given competing stimuli.  Difficulty with problem solving and managing details. Complex daily tasks performed with inconsistency, difficulty, or error.     Safety:  " Medications should be monitored, stove use may require supervision, and driving ability may be affected.  Impaired safety awareness with inability to anticipate potential problems.  May not recognize or respond to emergent situations. Requires frequent check-in support.   ADL:  Mild difficulty with simple everyday self-care tasks. Benefits from structured, routine activity.  Will likely need reminders to complete tasks outside of the routine. Requires assistance with planning and IADL tasks like shopping and finances. Learns concrete tasks through repetition, but performance may not generalize. Tends to be impulsive with poor insight. Self centered behavior or inability to consider the needs of others is common.    ___4.0  Moderate cognitive-functional disability; abstract to concrete thought processes. Working memory and executive function impairments are obvious. Difficulty with planning and problem solving.  Behavior is goal-directed, but unable to follow multi-step directions, is easily distracted, and may not recognize mistakes.  Inability to anticipate hazards or understand precautions.  Safety:  Recommend 24-hour supervision for safety. Supervision needed for medication management and for hazardous activities. May not be able to follow a restricted diet. Can get lost in unfamiliar surroundings. Generally, persons functioning at level 4 should not be driving.   ADL:  Some decline in quality or frequency of ADL.  [place] enhanced by use of a routine, simple concrete directions, and caregiver set-up of needed items. Complex tasks such as money or home management typically requires assistance.  Relies heavily on vision to guide behavior; will ignore objects/hazards not in plain sight and can be distracted by irrelevant objects. Often has poor insight.  Able to carry out social conversation and may verbally  cover  for deficits leading caregivers to believe they are capable of functioning independently.        ___3.5  Moderate cognitive-functional disability; increased cues needed for task completion. Aware of concrete task steps but needs prompting or cues to initiate and complete simple tasks. Attention span is limited, simple directions may need to be repeated, and re-focus to a topic or task may be required.  Safety:  24-hour supervision required for safety and for assistance with daily tasks. Assistance required with medications, and access to medication should be limited. Meals, nutrition and dietary restrictions need to be monitored.  All hazardous activities should be restricted or supervised. Should not drive. Prone to wandering and can become lost.  ADL:  Moderate functional decline. Familiar tasks usually requires set-up of supplies and directions to complete steps. May need objects handed to them for task initiation. Function best with a set schedule in familiar surroundings with familiar people. All complex tasks must be done by others. Vocabulary is diminished and speech often unfocused.

## 2024-01-17 NOTE — TELEPHONE ENCOUNTER
Sent 's recommendation to fernando Jauregui RN  Patient Advocate Liason (PAL)  [facility]  Ph. [phone] / Fax. [phone]

## 2024-01-17 NOTE — PROGRESS NOTES
Nursing Note:  Austin CHEEMA Mattalison presents today for labs and IV start for CT scan.    Patient seen by provider today: No   present during visit today: Not Applicable.    Note: N/A.    Intravenous Access:  Labs drawn without difficulty.  Peripheral IV placed, 3 attempts.    Discharge Plan:   Patient was sent to radiology for CT scan appointment.    Nicci Saavedra RN

## 2024-01-20 PROBLEM — E83.42 HYPOMAGNESEMIA: Status: ACTIVE | Noted: 2024-01-01

## 2024-01-20 PROBLEM — E87.1 HYPONATREMIA: Status: ACTIVE | Noted: 2024-01-01

## 2024-01-20 PROBLEM — D50.9 IRON DEFICIENCY ANEMIA, UNSPECIFIED IRON DEFICIENCY ANEMIA TYPE: Status: ACTIVE | Noted: 2024-01-01

## 2024-01-20 PROBLEM — N18.31 STAGE 3A CHRONIC KIDNEY DISEASE (H): Status: ACTIVE | Noted: 2020-06-03

## 2024-01-20 PROBLEM — E11.69 TYPE 2 DIABETES MELLITUS WITH OTHER SPECIFIED COMPLICATION, WITH LONG-TERM CURRENT USE OF INSULIN (H): Status: ACTIVE | Noted: 2022-02-15

## 2024-01-20 PROBLEM — Z79.4 TYPE 2 DIABETES MELLITUS WITH OTHER SPECIFIED COMPLICATION, WITH LONG-TERM CURRENT USE OF INSULIN (H): Status: ACTIVE | Noted: 2022-02-15

## 2024-01-20 PROBLEM — D69.6 THROMBOCYTOPENIA (H): Status: ACTIVE | Noted: 2023-02-16

## 2024-01-20 PROBLEM — R53.1 GENERALIZED WEAKNESS: Status: ACTIVE | Noted: 2024-01-01

## 2024-01-20 NOTE — LETTER
Please call pt's wife Marcie zhao:[phone] to coordinate/schedule informational meeting for late next week. Thanks!

## 2024-01-21 NOTE — CONSULTS
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 PMH is significant for CAD on medical management, AAA, Afib s/p CV. He is admitted now for failure to thrive. Oncology consulted for history of malignancy.     #History of high risk stage IIA adenocarcinoma of the transverse colon and now with recurrent/metastatic disease to the liver  #Elevated LFTs due to tumor burden  #CLL, stage III  #Symptomatic anemia  #Failure to thrive  #Acute hyponatremia  #Poor functional status  #History of CAD, being medically managed  #History of AAA  #History of Afib    -Patient and I were to meet in clinic on 1/23/24 to review imaging and to discuss goals of care. He is admitted acutely now as he is significantly symptomatic from progression of disease. Wife, Marcie, is at bedside.   -I reviewed patient's clinical course and current imaging. He had significant reservation with adjuvant chemotherapy upon diagnosis six months ago. At the present, he is a poor candidate for systemic palliative treatment given his current functional status, advanced age, and comorbidities. Treatment in this setting would be FOLFOX +/- bevacizumab. We reviewed he would need biopsy of the liver, port placement, and treatment would be every 2 weeks. We reviewed the potential toxicities of treatment. He expresses again he would not wish to pursue chemotherapy. I have discussed goals of care, including hospice. Patient and wife wish to meet with the hospice team with hopes to return home with hospice. I have placed a consultation.   -Transfuse for Hb </=7.     Thank you for the consultation.  We are very appreciative of medicine team management. Hospice evaluation pending. We will round on Roula again tomorrow 1/22/24. They are hoping to be transferred to a private room.     It has been an honor caring for Benji Garza. We wish Marcie Leblanc, and the Greg family a peaceful transition.     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 PMH is significant for CAD on medical management, AAA, Afib s/p CV. He is admitted now for failure to thrive. Oncology consulted for history of malignancy.     Please see my clinic notes for detailed oncologic history. In July 2023, patient underwent repeat colonoscopy with findings of a large mass in the transverse colon. He underwent right colectomy with anastomosis on 9/8/23 with final path returning as: transverse colon adenocarcinoma, grade 3, no perforation/LVI/PNI, high tumor bud score, all margins negative, 0/26 lymph nodes. We had discussed six months of adjuvant fluorouracil/leucovorin vs capecitabine adjuvant therapy given his high risk features. Patient had significant reservation then given his advanced age and borderline performance status. The decision was made for observation.     In the interim, he has had significant weight loss, decrease in appetite and energy. Repeat imaging 1/17/24 showing new metastatic disease to the liver and he has elevation in LFTs. CEA is 200. He denies evidence of blood in stool.       REVIEW OF SYSTEMS:   A 14 point ROS was reviewed with pertinent positives and negatives in the HPI.      MEDICATIONS:  Current Facility-Administered Medications   Medication    aspirin EC tablet 81 mg    glucose gel 15-30 g    Or    dextrose 50 % injection 25-50  mL    Or    glucagon injection 1 mg    insulin aspart (NovoLOG) injection (RAPID ACTING)    insulin aspart (NovoLOG) injection (RAPID ACTING)    insulin glargine (LANTUS PEN) injection 10 Units    metoprolol succinate ER (TOPROL XL) 24 hr tablet 50 mg    ondansetron (ZOFRAN ODT) ODT tab 4 mg    Or    ondansetron (ZOFRAN) injection 4 mg    pantoprazole (PROTONIX) EC tablet 40 mg    predniSONE (DELTASONE) tablet 7.5 mg    rosuvastatin (CRESTOR) tablet 20 mg    senna-docusate (SENOKOT-S/PERICOLACE) 8.6-50 MG per tablet 1 tablet    Or    senna-docusate (SENOKOT-S/PERICOLACE) 8.6-50 MG per tablet 2 tablet    sodium chloride 0.9 % infusion    sodium chloride 0.9% BOLUS 1-250 mL         ALLERGIES:  Allergies   Allergen Reactions    Seasonal Allergies     Beta Adrenergic Blockers          PAST MEDICAL HISTORY:  Past Medical History:   Diagnosis Date    AAA (abdominal aortic aneurysm)     BCC (basal cell carcinoma of skin) - face     CLL (chronic lymphocytic leukemia)     Diaphragmatic hernia     Diverticulitis of colon     Esophageal reflux     Essential hypertension     Hyperlipidemia     Irritable bowel syndrome     Macular degeneration (senile) of retina     Mumps     Squamous Cell Carcinoma, Back 1988    Type 2 diabetes mellitus          PAST SURGICAL HISTORY:  Past Surgical History:   Procedure Laterality Date    ANESTHESIA CARDIOVERSION N/A 8/2/2021    Procedure: ANESTHESIA, FOR CARDIOVERSION;  Surgeon: GENERIC ANESTHESIA PROVIDER;  Location: [facility] OR    APPENDECTOMY OPEN  1966    BONE MARROW BIOPSY, BONE SPECIMEN, NEEDLE/TROCAR N/A 11/21/2017    Procedure: BIOPSY BONE MARROW;  BONE MARROW BIOPSY;  Surgeon: Shady Garcia MD;  Location:  GI    COLECTOMY RIGHT Right 9/8/2023    Procedure: 1. Diagnostic laparoscopy 2. Open extended right colectomy with ileocolic anastomosis for curative intent  3. Full mobilization of the splenic flexure;  Surgeon: Vanessa Frankel MD;  Location: [facility] OR    COLONOSCOPY       COLONOSCOPY N/A 2023    Procedure: COLONOSCOPY;  Surgeon: Julio Castro MD;  Location: [facility] OR    CV CORONARY ANGIOGRAM N/A 2022    Procedure: Coronary Angiogram;  Surgeon: Evaristo Beaulieu MD;  Location: [facility]    ENDOVASCULAR REPAIR ANEURYSM ABDOMINAL AORTA N/A 2017    Procedure: ENDOVASCULAR REPAIR ANEURYSM ABDOMINAL AORTA;  ENDOVASCULAR REPAIR ANEURYSM ABDOMINAL AORTA;  Surgeon: Milton Cazares MD;  Location: [facility] OR    Fistulotomy with marsupialization for repair of fisture in ano      IR ABDOMINAL AORTOGRAM  3/11/2019    IR VISCERAL EMBOLIZATION  2019    Multiple skin tags - resection      Squamous cell skin cancer resection - back      VASECTOMY           SOCIAL HISTORY:  Social History     Socioeconomic History    Marital status:      Spouse name: librado    Number of children: 0    Years of education: 17    Highest education level: Not on file   Occupational History    Occupation: retired   Tobacco Use    Smoking status: Former     Packs/day: 0.50     Years: 20.00     Additional pack years: 0.00     Total pack years: 10.00     Types: Cigarettes, Pipe     Quit date: 1975     Years since quittin.0    Smokeless tobacco: Former   Vaping Use    Vaping Use: Never used   Substance and Sexual Activity    Alcohol use: Yes     Alcohol/week: 10.0 - 14.0 standard drinks of alcohol     Types: 10 - 14 Standard drinks or equivalent per week     Comment: cocktail on weekend and glass of wine almost daily    Drug use: No    Sexual activity: Never   Other Topics Concern    Parent/sibling w/ CABG, MI or angioplasty before 65F 55M? Not Asked   Social History Narrative    Not on file     Social Determinants of Health     Financial Resource Strain: Low Risk  (2023)    Financial Resource Strain     Within the past 12 months, have you or your family members you live with been unable to get utilities (heat, electricity) when it was really  "needed?: No   Food Insecurity: Low Risk  (12/26/2023)    Food Insecurity     Within the past 12 months, did you worry that your food would run out before you got money to buy more?: No     Within the past 12 months, did the food you bought just not last and you didn t have money to get more?: No   Transportation Needs: Low Risk  (12/26/2023)    Transportation Needs     Within the past 12 months, has lack of transportation kept you from medical appointments, getting your medicines, non-medical meetings or appointments, work, or from getting things that you need?: No   Physical Activity: Not on file   Stress: Not on file   Social Connections: Not on file   Interpersonal Safety: Low Risk  (12/27/2023)    Interpersonal Safety     Do you feel physically and emotionally safe where you currently live?: Yes     Within the past 12 months, have you been hit, slapped, kicked or otherwise physically hurt by someone?: No     Within the past 12 months, have you been humiliated or emotionally abused in other ways by your partner or ex-partner?: No   Housing Stability: Low Risk  (12/26/2023)    Housing Stability     Do you have housing? : Yes     Are you worried about losing your housing?: No         FAMILY HISTORY:  Family History   Problem Relation Age of Onset    Cerebrovascular Disease Father         x 2    Hypertension Mother     C.A.D. Mother     Cancer Mother         skin    Eye Disorder Mother         glaucoma    Prostate Cancer No family hx of     Cancer - colorectal No family hx of     Glaucoma No family hx of     Macular Degeneration No family hx of          PHYSICAL EXAM:  Vital signs:  Temp: 98.6  F (37  C) Temp src: Oral BP: 96/49 Pulse: 95   Resp: 18 SpO2: 97 % O2 Device: None (Room air)     Weight: 79.1 kg (174 lb 6.4 oz)  Estimated body mass index is 25.75 kg/m  as calculated from the following:    Height as of 12/27/23: 1.753 m (5' 9\").    Weight as of this encounter: 79.1 kg (174 lb 6.4 oz).    ECOG: " 2.5-3  GENERAL/CONSTITUTIONAL: No acute distress.  RESPIRATORY: Clear to auscultation bilaterally. No crackles or wheezing.   CARDIOVASCULAR: Regular rate and rhythm without murmurs, gallops, or rubs.  GASTROINTESTINAL: Distended, pain with palpation.  MUSCULOSKELETAL: Warm and well-perfused, no cyanosis, clubbing, or edema.  NEUROLOGIC: Cranial nerves II-XII are intact. Alert, oriented, answers questions appropriately.  INTEGUMENTARY: Pallor.      LABS:  CBC RESULTS:   Recent Labs   Lab Test 01/21/24  0941   WBC 6.7   RBC 2.42*   HGB 7.1*   HCT 22.1*   MCV 91   MCH 29.3   MCHC 32.1   RDW 17.0*   PLT 64*       Recent Labs   Lab Test 01/21/24  0815 01/21/24  0756 01/21/24  0028 01/20/24  1837   NA  --  127*  --  128*   POTASSIUM  --  4.1  --  4.5   CHLORIDE  --  96*  --  92*   CO2  --  20*  --  22   ANIONGAP  --  11  --  14   * 124*   < > 148*   BUN  --  26.2*  --  29.4*   CR  --  1.40*  --  1.53*   MAGDALENO  --  8.0*  --  9.1    < > = values in this interval not displayed.     Lab Results   Component Value Date     01/21/2024    AST 15 06/01/2021     Lab Results   Component Value Date    ALT 90 01/21/2024    ALT 21 06/01/2021     Lab Results   Component Value Date    BILICONJ 0.0 04/29/2005      Lab Results   Component Value Date    BILITOTAL 0.9 01/21/2024    BILITOTAL 0.7 06/01/2021     Lab Results   Component Value Date    ALBUMIN 2.8 01/21/2024    ALBUMIN 3.8 08/02/2022    ALBUMIN 3.6 06/01/2021     Lab Results   Component Value Date    PROTTOTAL 5.4 01/21/2024    PROTTOTAL 7.0 06/01/2021      Lab Results   Component Value Date    ALKPHOS 209 01/21/2024    ALKPHOS 58 06/01/2021         IMAGING:  CT CAP 1/17/24:  IMPRESSION:  1.  Interval development of multiple masses within the liver diffusely  consistent with new liver metastatic disease.  2.  Slightly larger upper abdominal lymph nodes are indeterminate.  3.  No new disease in the chest. Stable small pulmonary nodules.  4.  Stable endovascular repair  of the abdominal aorta. 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 Stable mild enlargement of the ascending  thoracic aorta.  5.  Stable splenomegaly.          Thank you for the opportunity to participate in this patient's care.  Please call with any questions.    Miriam Badillo DO  Hematology/Oncology  [facility]

## 2024-01-21 NOTE — PLAN OF CARE
RIMARY DIAGNOSIS: Weakness/ Hyponatremia   OUTPATIENT/OBSERVATION GOALS TO BE MET BEFORE DISCHARGE:  1. Pain Status: Pain free.     2. Return to near baseline physical activity: No     3. Cleared for discharge by consultants (if involved): No     Discharge Planner Nurse   Safe discharge environment identified: Yes  Barriers to discharge: Yes     Patient is Alert and Oriented x4 but forgetful. They are 1 Assist with Gait Belt and Walker.  Pt is a Regular diet, carb count.  They are denying pain.  Patient has Normal Saline 0.9% running at 75 mL per hour. Mag replacement protocol. Hgb 6.6 this am. Transfusing PRBC now.  PT/OT/Hem/Onco consults.

## 2024-01-21 NOTE — H&P
[facility]    History and Physical - Hospitalist Service       Date of Admission:  1/20/2024    Assessment & Plan      Austin Garza is a 83 year old male patient with past medical history of hypertension, GERD, CLL, colon cancer, basal cell and squamous cell skin cancers, DM2, CAD, CKD who presented to ED with several weeks of progressively worsening weakness.  Labs in the ED were pretty unremarkable apart from a mild NINA and some electrolyte abnormalities.  Reviewing CT scan that was obtained by oncology for routine monitoring given patient's extensive cancer history on 1/17 unfortunately showed interval development of likely metastatic liver lesions.  Believe this could be the cause of patient's progressively worsening functional status and weakness.  Consulting oncology for additional input.    Weakness  Deconditioning  Progressively worsening over the last few weeks.  Also has had a pretty poor appetite during that time.  No other real illness symptoms.  Denies fevers, chills, night sweats, respiratory symptoms.  Does have some loose stools, but this is chronic following colectomy due to colon cancer.  CT of the abdomen and pelvis from 1/17 showed interval development of likely metastatic liver lesions.  Progression of cancer could explain patient's presentation, would recommend oncology for further input.  - Maintenance IV fluids  - Recheck labs in a.m.  - Consult oncology  - PT/OT assessments    New masses of the liver  Adenocarcinoma of transverse colon laparoscopic, open extended right colectomy with ileocolic anastomosis  CLL/SLL, stage 3  SSC of the jaw s/p Moh's  Basal cell carcinoma  MGUS  Extensive cancer history and follows with oncology regularly as outpatient.  Regarding the colon cancer, had colectomy last year.  Per most recent oncology note, patient declined adjuvant chemotherapy and elected for observation and surveillance of disease.  Regarding CLL/SLL, no current  therapies, under surveillance as well.  Regarding SCC and BSC, has had multiple cancerous lesions removed, follows with dermatology.  Not very clear what is causing current weakness and deconditioning, although denies any B symptoms or other new symptoms. Oncology Ordered repeat CT CAP which was done 1/17 and unfortunately showed multiple masses of the liver consistent with new liver metastatic disease.  Consulting oncology as above for their recommendations.  - Consulting oncology as above     Chronic steroid use  Possible adrenal insufficiency  Currently has a steroid taper ongoing.  Per taper recommendations, currently taking 7.5 mg daily.  Will continue that for now. Next dose adjustment is decreased to 5 mg daily on 1/26.  - Continue prednisone 7.5mg daily through 1/25     Diabetes mellitus type 2.  PTA on 10 units Lantus daily, sliding scale insulin, dulaglutide, metformin.  Will continue Lantus and sliding scale insulin while admitted.    Atrial fibrillation, previously on anticoagulation, now in sinus rhythm  Had A-fib with RVR during hospitalization last year.  Underwent DCCV with conversion to SR.  Continuing PTA metoprolol. Was on anticoagulation following DCCV, but is no longer anticoagulated. Has been in sinus rhythm here.   - Continue PTA metoprolol succinate     Hypertension  Continue metoprolol to 50 mg twice a day.  Will hold losartan for now with NINA.    CAD s/p PCI x4 in 2021  Has had little follow-up with cardiology after this procedure. Continuing ASA, statin,  metoprolol, holding ARB due to NINA.     NINA on chronic kidney disease  Baseline creatinine around 1.1-1.3.  Creatinine was 1.53 on admission.  Patient does report poor p.o. intake due to progressively worsening weakness, fatigue, poor appetite.  Received IV fluids, will recheck in the morning.  - Repeat BMP in a.m.     Hyponatremia  Hypomagnesemia  Hypocalcemia  Likely hypovolemic hyponatremia given poor p.o. intake reported by patient.   "Other electrolytes likely low due to the same issue.  Will replace per RN driven protocols.  Will monitor sodium on daily labs.     Leukocytosis  Anemia of chronic disease  Chronic thrombocytopenia  Platelets appear to be at baseline.  Platelets and hemoglobin appear to be at baseline.  White blood cell count is mildly elevated at 11.1, most recently has been within normal limits.  Not describing any illness symptoms apart from weakness and deconditioning.  Checking peripheral smear as above.  Daily CBC.     Hyperlipidemia  PTA rosuvastatin.    Abdominal aortic aneurysm  Measured up to 7.6 cm on CT.  Underwent EVAR in 2017.  Complicated by dissection of SMA, underwent embolization with IR.  Continue follow-up with vascular surgery.  Repeat CT being done as above.        Diet:  Regular diet  DVT Prophylaxis: Pneumatic Compression Devices  Siddiqui Catheter: Not present  Lines: None     Cardiac Monitoring: None  Code Status:  Full code    Clinically Significant Risk Factors Present on Admission         # Hyponatremia: Lowest Na = 128 mmol/L in last 2 days, will monitor as appropriate    # Hypomagnesemia: Lowest Mg = 1.3 mg/dL in last 2 days, will replace as needed     # Drug Induced Platelet Defect: home medication list includes an antiplatelet medication   # Hypertension: Noted on problem list      # Overweight: Estimated body mass index is 27.29 kg/m  as calculated from the following:    Height as of 12/27/23: 1.753 m (5' 9\").    Weight as of 12/27/23: 83.8 kg (184 lb 12.8 oz).              Disposition Plan           Roberto Carlos Michel MD  Hospitalist Service  [facility]  Securely message with [employer] (more info)  Text page via [facility] Paging/Directory     ______________________________________________________________________    Chief Complaint   Weakness, deconditioning    History is obtained from the patient     History of Present Illness   Austin Garza is a 83 year old male who Has an extensive " past cancer history including adenocarcinoma of the colon, CLL, DM 2, basal and squamous cell skin cancer, hypertension, CAD presents to the ED with progressively worsening weakness.  Patient reports that over the last few weeks he is just gotten weaker and weaker, having trouble ambulating around his home or getting things done for himself.  Has also had a poor appetite during this time.  Denies any fevers, chills, night sweats, respiratory symptoms, or other illness symptoms.  Having normal bowel movements and urinary pattern per him, has had chronic diarrhea since his colectomy for his colon cancer but no interval change in this.    In the ED labs were relatively unremarkable apart from a mild NINA and some electrolyte abnormalities.  Did undergo a routine monitoring CT CAP ordered by oncology on 1/17, which did unfortunately show interval development of likely metastatic liver lesions.  Being admitted for IV fluids, electrolyte replacement, consulting oncology for their input.      Past Medical History    Past Medical History:   Diagnosis Date    AAA (abdominal aortic aneurysm)     BCC (basal cell carcinoma of skin) - face     CLL (chronic lymphocytic leukemia)     Diaphragmatic hernia     Diverticulitis of colon     Esophageal reflux     Essential hypertension     Hyperlipidemia     Irritable bowel syndrome     Macular degeneration (senile) of retina     Mumps     Squamous Cell Carcinoma, Back 1988    Type 2 diabetes mellitus        Past Surgical History   Past Surgical History:   Procedure Laterality Date    ANESTHESIA CARDIOVERSION N/A 8/2/2021    Procedure: ANESTHESIA, FOR CARDIOVERSION;  Surgeon: GENERIC ANESTHESIA PROVIDER;  Location: [facility] OR    APPENDECTOMY OPEN  1966    BONE MARROW BIOPSY, BONE SPECIMEN, NEEDLE/TROCAR N/A 11/21/2017    Procedure: BIOPSY BONE MARROW;  BONE MARROW BIOPSY;  Surgeon: Shady Garcia MD;  Location:  GI    COLECTOMY RIGHT Right 9/8/2023    Procedure: 1. Diagnostic  laparoscopy 2. Open extended right colectomy with ileocolic anastomosis for curative intent  3. Full mobilization of the splenic flexure;  Surgeon: Vanessa Frankel MD;  Location: [facility] OR    COLONOSCOPY      COLONOSCOPY N/A 2023    Procedure: COLONOSCOPY;  Surgeon: Julio Castro MD;  Location: [facility] OR    CV CORONARY ANGIOGRAM N/A 2022    Procedure: Coronary Angiogram;  Surgeon: Evaristo Beaulieu MD;  Location: [facility]ATH LAB    ENDOVASCULAR REPAIR ANEURYSM ABDOMINAL AORTA N/A 2017    Procedure: ENDOVASCULAR REPAIR ANEURYSM ABDOMINAL AORTA;  ENDOVASCULAR REPAIR ANEURYSM ABDOMINAL AORTA;  Surgeon: Milton Cazares MD;  Location: [facility] OR    Fistulotomy with marsupialization for repair of fisture in ano      IR ABDOMINAL AORTOGRAM  3/11/2019    IR VISCERAL EMBOLIZATION  2019    Multiple skin tags - resection      Squamous cell skin cancer resection - back  -    VASECTOMY         Prior to Admission Medications   Prior to Admission Medications   Prescriptions Last Dose Informant Patient Reported? Taking?   Alcohol Swabs (ALCOHOL PREP) 70 % PADS   Yes No   Continuous Blood Gluc Sensor (DEXCOM G7 SENSOR) MISC   No No   Si each every 10 days Change sensor every 10 days   Dulaglutide 4.5 MG/0.5ML SOPN   No No   Sig: Inject 4.5 mg Subcutaneous once a week   HUMALOG KWIKPEN 100 UNIT/ML soln   Yes No   Microlet Lancets MISC   Yes No   Sig: USE TO TEST BLOOD SUGAR 3 TIMES DAILY OR AS DIRECTED.   aspirin 81 MG EC tablet   No No   Sig: Take 1 tablet (81 mg) by mouth daily   blood glucose (NO BRAND SPECIFIED) lancets standard   No No   Sig: Use to test blood sugar 3 times daily or as directed.   blood glucose (NO BRAND SPECIFIED) test strip   No No   Sig: Contour Next Test Strips-Use to test blood sugar 3 times daily or as directed.   insulin aspart (NOVOLOG PEN) 100 UNIT/ML pen   No No   Sig: Inject 1-7 Units Subcutaneous 3 times daily (before meals) Do Not give  "Correction Insulin if Pre-Meal BG less than 140. For Pre-Meal  - 189 give 1 unit. For Pre-Meal  - 239 give 2 units. For Pre-Meal  - 289 give 3 units. For Pre-Meal  - 339 give 4 units. For Pre-Meal - 399 give 5 units. For Pre-Meal -449 give 6 units For Pre-Meal BG greater than or equal to 450 give 7 units.   insulin glargine (LANTUS PEN) 100 UNIT/ML pen   No No   Sig: Inject 10 Units Subcutaneous every morning (before breakfast)   insulin pen needle (32G X 4 MM) 32G X 4 MM miscellaneous   No No   Sig: Use 4 pen needles daily or as directed.   losartan (COZAAR) 25 MG tablet   No No   Sig: Take 1 tablet (25 mg) by mouth daily   metFORMIN (GLUCOPHAGE XR) 500 MG 24 hr tablet   No No   Sig: Take 2 tablets (1,000 mg) by mouth daily (with dinner)   metoprolol succinate ER (TOPROL XL) 100 MG 24 hr tablet   No No   Sig: Take 1 tablet (100 mg) by mouth daily   nitroGLYcerin (NITROSTAT) 0.4 MG sublingual tablet   No No   Sig: One tablet under the tongue every 5 minutes if needed for chest pain. May repeat every 5 minutes for a maximum of 3 doses in 15 minutes\"   oxyCODONE (ROXICODONE) 5 MG tablet   No No   Sig: Take 1 tablet (5 mg) by mouth every 6 hours as needed for moderate pain or moderate to severe pain   pantoprazole (PROTONIX) 40 MG EC tablet   No No   Sig: Take 1 tablet (40 mg) by mouth daily   rosuvastatin (CRESTOR) 20 MG tablet   No No   Sig: Take 1 tablet (20 mg) by mouth daily   thiamine (B-1) 100 MG tablet   No No   Sig: Take 1 tablet (100 mg) by mouth daily      Facility-Administered Medications: None        Review of Systems    The 10 point Review of Systems is negative other than noted in the HPI or here.      Physical Exam   Vital Signs: Temp: 97.1  F (36.2  C) Temp src: Oral BP: 108/58 Pulse: 94   Resp: 20 SpO2: 97 % O2 Device: None (Room air)    Weight: 0 lbs 0 oz    Constitutional: Lying in bed, well-appearing, no acute distress  Respiratory: Normal work of breathing, " lungs clear to auscultation  Cardiovascular: RRR, no MRG's  GI: Soft, nontender, nondistended  Neurologic: Alert, oriented, answering questions appropriately    Medical Decision Making       75 MINUTES SPENT BY ME on the date of service doing chart review, history, exam, documentation & further activities per the note.      Data     I have personally reviewed the following data over the past 24 hrs:    11.1 (H)  \   8.6 (L)   / 95 (L)     128 (L) 92 (L) 29.4 (H) /  148 (H)   4.5 22 1.53 (H) \     Trop: 23 (H) BNP: N/A     Procal: N/A CRP: N/A Lactic Acid: 0.9         Imaging results reviewed over the past 24 hrs:   Recent Results (from the past 24 hour(s))   XR Chest 2 Views    Narrative    EXAM: XR CHEST 2 VIEWS  LOCATION: [facility]  DATE: 1/20/2024    INDICATION: generalized weakness  COMPARISON: 01/17/2024.     Impression    IMPRESSION: The lungs are clear. There is no pleural effusion or pneumothorax. The cardiomediastinal silhouette is normal. The limited visualized portions of the upper abdomen are grossly normal.       CT CAP 1/17/24  IMPRESSION:  1.  Interval development of multiple masses within the liver diffusely  consistent with new liver metastatic disease.  2.  Slightly larger upper abdominal lymph nodes are indeterminate.  3.  No new disease in the chest. Stable small pulmonary nodules.  4.  Stable endovascular repair of the abdominal aorta. 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 Stable mild enlargement of the ascending  thoracic aorta.  5.  Stable splenomegaly.

## 2024-01-21 NOTE — PLAN OF CARE
PRIMARY DIAGNOSIS: Weakness/ Hyponatremia   OUTPATIENT/OBSERVATION GOALS TO BE MET BEFORE DISCHARGE:  1. Pain Status: Pain free.     2. Return to near baseline physical activity: No     3. Cleared for discharge by consultants (if involved): No     Discharge Planner Nurse   Safe discharge environment identified: Yes  Barriers to discharge: Yes     Patient is Alert and Oriented x4 but forgetful. They are 1 Assist with Gait Belt and Walker.  Pt is a Regular diet, carb count.  They are denying pain.  Patient has Normal Saline 0.9% running at 75 mL per hour. Mag replacement protocol. Hgb 6.6 this am. Plan to transfuse PRBC.  PT/OT/Hem/Onco consults.

## 2024-01-21 NOTE — ED TRIAGE NOTES
Arrived via EMS, wife concerend over past week pt increasing in weakness, pt denies any chest pain but does endorse shortness of breath with activity.  No falls  reported since December, No flu symptoms.   Pt main c/o can't walk from bedroom to kitchen.  Pt does state then only place that he has pain is tailbone but states since before bautista from his fall. GCS 15.       Triage Assessment (Adult)       Row Name 01/20/24 1803          Triage Assessment    Airway WDL WDL        Respiratory WDL    Respiratory WDL WDL        Skin Circulation/Temperature WDL    Skin Circulation/Temperature WDL WDL        Cardiac WDL    Cardiac WDL X  regular tachy     Cardiac Rhythm NSR        Peripheral/Neurovascular WDL    Peripheral Neurovascular WDL WDL        Cognitive/Neuro/Behavioral WDL    Cognitive/Neuro/Behavioral WDL WDL

## 2024-01-21 NOTE — ED NOTES
[facility]  ED Nurse Handoff Report    ED Chief complaint: Generalized Weakness  . ED Diagnosis:   Final diagnoses:   Generalized weakness   Stage 3a chronic kidney disease (H)   Type 2 diabetes mellitus with other specified complication, with long-term current use of insulin (H)   Iron deficiency anemia, unspecified iron deficiency anemia type   Thrombocytopenia (H24)   Hypomagnesemia   Hyponatremia       Allergies:   Allergies   Allergen Reactions    Seasonal Allergies        Code Status: Full Code    Activity level - Baseline/Home:  standby.  Activity Level - Current:   assist of 2.   Lift room needed: No.   Bariatric: No   Needed: No   Isolation: No.   Infection: Not Applicable.     Respiratory status: Room air    Vital Signs (within 30 minutes):   Vitals:    01/20/24 1843 01/20/24 1845 01/20/24 1900 01/20/24 2123   BP:  107/58 108/58    Pulse: 106 105  94   Resp: 16 20     Temp:       TempSrc:       SpO2: 98% 97%         Cardiac Rhythm:  ,   Cardiac  Cardiac Rhythm: Normal sinus rhythm  Pain level:    Patient confused: No.   Patient Falls Risk: nonskid shoes/slippers when out of bed, arm band in place, patient and family education, assistive device/personal items within reach, activity supervised, and mobility aid in reach.   Elimination Status: Has voided     Patient Report - Initial Complaint: Generalized weakness.   Focused Assessment: Austin Garza is a pleasant 83 year old male presenting with generalized weakness.       Patient has a history of type 2 diabetes, chronic lymphocytic leukemia, cancer of transverse colon, iron deficiency anemia, cardiomyopathy, thrombocytopenia, paroxysmal atrial fibrillation among others.      Patient complains of increasing weakness over the last week, getting especially worse today. He notes that he hasn't been eating and drinking much because he isn't hungry.  No recent falls.  The patient is finding he cannot walk around the house due  to feeling too weak.  No specific numbness or weakness of any extremity.     He does have loose stools following his partial colectomy and reanastomosis in September 2023. The patient denies chest pain, shortness of breath, fever, chills, nausea, vomiting, abdominal pain, dysuria, difficulty urinating, diarrhea, hematochezia, recent falls. The patient denies blood clots, recent travel, recent time in hospital.      Abnormal Results:   Labs Ordered and Resulted from Time of ED Arrival to Time of ED Departure   MAGNESIUM - Abnormal       Result Value    Magnesium 1.3 (*)    TROPONIN T, HIGH SENSITIVITY - Abnormal    Troponin T, High Sensitivity 23 (*)    CBC WITH PLATELETS - Abnormal    WBC Count 11.1 (*)     RBC Count 2.99 (*)     Hemoglobin 8.6 (*)     Hematocrit 26.8 (*)     MCV 90      MCH 28.8      MCHC 32.1      RDW 16.9 (*)     Platelet Count 95 (*)    BASIC METABOLIC PANEL - Abnormal    Sodium 128 (*)     Potassium 4.5      Chloride 92 (*)     Carbon Dioxide (CO2) 22      Anion Gap 14      Urea Nitrogen 29.4 (*)     Creatinine 1.53 (*)     GFR Estimate 45 (*)     Calcium 9.1      Glucose 148 (*)    ROUTINE UA WITH MICROSCOPIC REFLEX TO CULTURE - Abnormal    Color Urine Yellow      Appearance Urine Slightly Cloudy (*)     Glucose Urine Negative      Bilirubin Urine Negative      Ketones Urine Negative      Specific Gravity Urine 1.018      Blood Urine Negative      pH Urine 5.0      Protein Albumin Urine 30 (*)     Urobilinogen Urine Normal      Nitrite Urine Negative      Leukocyte Esterase Urine Negative      Bacteria Urine Few (*)     Mucus Urine Present (*)     RBC Urine 1      WBC Urine 2      Squamous Epithelials Urine <1      Renal Tubular Epithelials Urine 2 (*)    TROPONIN T, HIGH SENSITIVITY - Abnormal    Troponin T, High Sensitivity 23 (*)    INFLUENZA A/B, RSV, & SARS-COV2 PCR - Normal    Influenza A PCR Negative      Influenza B PCR Negative      RSV PCR Negative      SARS CoV2 PCR Negative      LACTIC ACID WHOLE BLOOD - Normal    Lactic Acid 0.9          XR Chest 2 Views   Final Result   IMPRESSION: The lungs are clear. There is no pleural effusion or pneumothorax. The cardiomediastinal silhouette is normal. The limited visualized portions of the upper abdomen are grossly normal.             Treatments provided: see MAR  Family Comments:   OBS brochure/video discussed/provided to patient:  Yes  ED Medications:   Medications   magnesium sulfate 2 g in 50 mL sterile water intermittent infusion (2 g Intravenous $New Bag 1/20/24 2003)   sodium chloride 0.9% BOLUS 1,000 mL (1,000 mLs Intravenous $New Bag 1/20/24 2049)       Drips infusing:  Yes  For the majority of the shift this patient was Green.   Interventions performed were N/a.    Sepsis treatment initiated: No    Cares/treatment/interventions/medications to be completed following ED care:     ED Nurse Name: Geri Luu RN  10:07 PM

## 2024-01-21 NOTE — CONSULTS
Care Management Initial Consult    General Information  Assessment completed with: Spouse or significant otherMarcie       Primary Care Provider verified and updated as needed:     Readmission within the last 30 days: no previous admission in last 30 days      Reason for Consult: discharge planning  Advance Care Planning:            Communication Assessment  Patient's communication style: spoken language (English or Bilingual)    Hearing Difficulty or Deaf: yes        Cognitive  Cognitive/Neuro/Behavioral: WDL                      Living Environment:   People in home: spouse     Current living Arrangements: house      Able to return to prior arrangements:         Family/Social Support:  Care provided by: self, spouse/significant other  Provides care for: no one  Marital Status:   Wife  Marcie       Description of Support System: Supportive, Involved         Current Resources:   Patient receiving home care services: No     Community Resources:    Equipment currently used at home:    Supplies currently used at home:      Employment/Financial:  Employment Status:          Financial Concerns:             Does the patient's insurance plan have a 3 day qualifying hospital stay waiver?  No    Lifestyle & Psychosocial Needs:  Social Determinants of Health     Food Insecurity: Low Risk  (12/26/2023)    Food Insecurity     Within the past 12 months, did you worry that your food would run out before you got money to buy more?: No     Within the past 12 months, did the food you bought just not last and you didn t have money to get more?: No   Depression: At risk (5/30/2023)    PHQ-2     PHQ-2 Score: 3   Housing Stability: Low Risk  (12/26/2023)    Housing Stability     Do you have housing? : Yes     Are you worried about losing your housing?: No   Tobacco Use: Medium Risk (12/27/2023)    Patient History     Smoking Tobacco Use: Former     Smokeless Tobacco Use: Former     Passive Exposure: Not on file   Financial Resource  "Strain: Low Risk  (12/26/2023)    Financial Resource Strain     Within the past 12 months, have you or your family members you live with been unable to get utilities (heat, electricity) when it was really needed?: No   Alcohol Use: Not on file   Transportation Needs: Low Risk  (12/26/2023)    Transportation Needs     Within the past 12 months, has lack of transportation kept you from medical appointments, getting your medicines, non-medical meetings or appointments, work, or from getting things that you need?: No   Physical Activity: Not on file   Interpersonal Safety: Low Risk  (12/27/2023)    Interpersonal Safety     Do you feel physically and emotionally safe where you currently live?: Yes     Within the past 12 months, have you been hit, slapped, kicked or otherwise physically hurt by someone?: No     Within the past 12 months, have you been humiliated or emotionally abused in other ways by your partner or ex-partner?: No   Stress: Not on file   Social Connections: Not on file       Functional Status:  Prior to admission patient needed assistance:   Dependent ADLs:: Ambulation-cane, Ambulation-walker  Dependent IADLs:: Independent, Cooking, Cleaning  Assesssment of Functional Status: Not at baseline with mobility    Mental Health Status:  Mental Health Status: No Current Concerns       Chemical Dependency Status:  Chemical Dependency Status: No Current Concerns             Values/Beliefs:  Spiritual, Cultural Beliefs, Gnosticist Practices, Values that affect care: no               Additional Information:  Met with patient and wife, patient could not stay awake so wife asked to speak outside of room.  Marcie, wife sates they had\"a bomb dropped on them\", she feels very overwhelmed and is requesting a Palliative consult and a private room.  Discussed hospice services in the home, a snf or residential home briefly, Marcie requests follow up tomorrow as she feels they need time to think about all this.  Marcie wants patient " to help make decisions so will have SW follow up tomorrow to discuss discharge options/hospice options further.    Brock Rios, LOYDA  [phone]

## 2024-01-21 NOTE — ED PROVIDER NOTES
History   Chief Complaint:  Generalized Weakness       HPI:  Austin Garza is a pleasant 83 year old male presenting with generalized weakness.      Patient has a history of type 2 diabetes, chronic lymphocytic leukemia, cancer of transverse colon, iron deficiency anemia, cardiomyopathy, thrombocytopenia, paroxysmal atrial fibrillation among others.     Patient complains of increasing weakness over the last week, getting especially worse today. He notes that he hasn't been eating and drinking much because he isn't hungry.  No recent falls.  The patient is finding he cannot walk around the house due to feeling too weak.  No specific numbness or weakness of any extremity.    He does have loose stools following his partial colectomy and reanastomosis in September 2023. The patient denies chest pain, shortness of breath, fever, chills, nausea, vomiting, abdominal pain, dysuria, difficulty urinating, diarrhea, hematochezia, recent falls. The patient denies blood clots, recent travel, recent time in hospital.     Independent Historian:  None. Only the patient provided history.    Review of External Notes:  I personally reviewed notes from the patient's discharge summary dated  9/20/23 . This provided me with information regarding patient's baseline medical problems and patient's recent hospitalization.     I personally reviewed the patient's chart, including available medication list and available past medical history, past surgical history, family history, and social history.    Physical Exam   Patient Vitals for the past 24 hrs:   BP Temp Temp src Pulse Resp SpO2   01/20/24 2123 -- -- -- 94 -- --   01/20/24 1900 108/58 -- -- -- -- --   01/20/24 1845 107/58 -- -- 105 20 97 %   01/20/24 1843 -- -- -- 106 16 98 %   01/20/24 1830 117/77 -- -- 105 18 98 %   01/20/24 1817 102/60 97.1  F (36.2  C) Oral 107 18 98 %   01/20/24 1813 102/60 -- -- 106 24 98 %   01/20/24 1802 113/65 -- -- -- -- --      Physical Exam  Vitals and  nursing note reviewed.   Constitutional:       Appearance: Normal appearance. He is ill-appearing.   HENT:      Head: Atraumatic.   Eyes:      General: No scleral icterus.     Extraocular Movements: Extraocular movements intact.      Conjunctiva/sclera: Conjunctivae normal.      Pupils: Pupils are equal, round, and reactive to light.   Cardiovascular:      Rate and Rhythm: Regular rhythm. Tachycardia present.      Pulses: Normal pulses.      Heart sounds: Normal heart sounds.   Pulmonary:      Effort: Pulmonary effort is normal.      Breath sounds: Normal breath sounds.   Abdominal:      Palpations: Abdomen is soft.      Tenderness: There is no abdominal tenderness.   Skin:     General: Skin is warm and dry.      Coloration: Skin is pale. Skin is not jaundiced.   Neurological:      Mental Status: He is alert and oriented to person, place, and time.      Cranial Nerves: No cranial nerve deficit.      Sensory: No sensory deficit.      Motor: No weakness.            Emergency Department Course     Imaging & ECG: Laboratory:   ECG:  ECG taken at 1823, ECG read at 1825  Sinus tachycardia   No ST segment elevation or depression  Abnormal ECG  Rate 106 bpm. HI interval 202 ms. QRS duration 70 ms. QT/QTc 312/414 ms. P-R-T axes 58 -9 61.        XR Chest 2 Views   Final Result   IMPRESSION: The lungs are clear. There is no pleural effusion or pneumothorax. The cardiomediastinal silhouette is normal. The limited visualized portions of the upper abdomen are grossly normal.            Report per radiology.  Labs Ordered and Resulted from Time of ED Arrival to Time of ED Departure   MAGNESIUM - Abnormal       Result Value    Magnesium 1.3 (*)    TROPONIN T, HIGH SENSITIVITY - Abnormal    Troponin T, High Sensitivity 23 (*)    CBC WITH PLATELETS - Abnormal    WBC Count 11.1 (*)     RBC Count 2.99 (*)     Hemoglobin 8.6 (*)     Hematocrit 26.8 (*)     MCV 90      MCH 28.8      MCHC 32.1      RDW 16.9 (*)     Platelet Count 95 (*)     BASIC METABOLIC PANEL - Abnormal    Sodium 128 (*)     Potassium 4.5      Chloride 92 (*)     Carbon Dioxide (CO2) 22      Anion Gap 14      Urea Nitrogen 29.4 (*)     Creatinine 1.53 (*)     GFR Estimate 45 (*)     Calcium 9.1      Glucose 148 (*)    TROPONIN T, HIGH SENSITIVITY - Abnormal    Troponin T, High Sensitivity 23 (*)    INFLUENZA A/B, RSV, & SARS-COV2 PCR - Normal    Influenza A PCR Negative      Influenza B PCR Negative      RSV PCR Negative      SARS CoV2 PCR Negative     LACTIC ACID WHOLE BLOOD - Normal    Lactic Acid 0.9     ROUTINE UA WITH MICROSCOPIC REFLEX TO CULTURE         Procedures  None performed    Interventions & Assessments:    Interventions:  Medications   magnesium sulfate 2 g in 50 mL sterile water intermittent infusion (2 g Intravenous $New Bag 1/20/24 2003)   sodium chloride 0.9% BOLUS 1,000 mL (1,000 mLs Intravenous $New Bag 1/20/24 2049)        Assessments:  1820 I obtained history and examined the patient as noted above.    Independent Interpretation (X-rays, CTs, rhythm strip):  I independently interpreted the patient's chest x-ray; reassuring against infiltrate.    Consultations/Discussion of Management or Tests:  None    Social Determinants of Health affecting care:   None.      Disposition:  The patient was admitted to the hospital under the care of Dr. Michel.     Impression & Plan        Medical Decision Making:  Patient presenting with generalized weakness, decreased oral intake.  Vital signs notable for tachycardia upon arrival otherwise reassuring.  Considered differential including electrolyte derangement, metabolic abnormality, viral syndrome such as COVID or influenza, sepsis, cardiac arrhythmia, occult infection, among others.  Patient had no focal neurologic changes on exam that would be concerning for stroke.  Workup here was nonspecific.  Patient did have electrolyte derangements including hyponatremia, hypomagnesemia, and slightly worse renal function from  baseline.  Did not meet criteria for NINA.  He has mild leukocytosis, which is nonspecific, along with thrombocytopenia and normocytic anemia which close to his baseline.  Lactate was within normal limits, reassuring against severe sepsis.  Viral testing was negative.  EKG was negative for acute ischemic changes or arrhythmia.  Did obtain troponin which was mildly elevated but stable on repeat.  Low suspicion for acute myocardial injury.  I am awaiting a urinalysis to evaluate for UTI.  The patient does appear somewhat dry and despite fluids, has not urinated.  Chest x-ray was reassuring against pneumonia.  Given patient's generalized weakness and decreased function, feel he does require further evaluation and management in the hospital.    Diagnosis:    ICD-10-CM    1. Generalized weakness  R53.1       2. Stage 3a chronic kidney disease (H)  N18.31       3. Type 2 diabetes mellitus with other specified complication, with long-term current use of insulin (H)  E11.69     Z79.4       4. Iron deficiency anemia, unspecified iron deficiency anemia type  D50.9       5. Thrombocytopenia (H24)  D69.6       6. Hypomagnesemia  E83.42       7. Hyponatremia  E87.1            Discharge Medications:  New Prescriptions    No medications on file          Peter Castro MD  01/20/24 7345

## 2024-01-21 NOTE — UTILIZATION REVIEW
Concurrent stay review; Secondary Review Determination     [facility]          Under the authority of the Utilization Management Committee, the utilization review process indicated a secondary review on the above patient.  The review outcome is based on review of the medical records, discussions with staff, and applying clinical experience noted on the date of the review.          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  Labs in the ED were pretty unremarkable apart from a mild NINA and some electrolyte abnormalities.  Reviewing CT scan that was obtained by oncology for routine monitoring given patient's extensive cancer history on 1/17 unfortunately showed interval development of likely metastatic liver lesions.  Believe this could be the cause of patient's progressively worsening functional status and weakness.    After the oncology consult, the patient decided for hospice care.  The patient and the family are overwhelmed with the news and they requested to meet with  again tomorrow, as they feel they need time to think about the hospice options.    There is no clear indication to change patient's status to inpatient. The severity of illness, intensity of service provided, expected LOS and risk for adverse outcome make the care appropriate for observation.      If the patient is not discharged tomorrow, consider utilization review for long-term care    This document was produced using voice recognition software       The information on this document is developed by the utilization review team in order for the business office to ensure compliance.  This only denotes the appropriateness of proper  admission status and does not reflect the quality of care rendered.         The definitions of Inpatient Status and Observation Status used in making the determination above are those provided in the CMS Coverage Manual, Chapter 1 and Chapter 6, section 70.4.      Sincerely,     MYA YUAN MD   Utilization Review  Physician Advisor  [facility]

## 2024-01-21 NOTE — ED NOTES
[facility]  ED Nurse Handoff Report    ED Chief complaint: Generalized Weakness  . ED Diagnosis:   Final diagnoses:   Generalized weakness   Stage 3a chronic kidney disease (H)   Type 2 diabetes mellitus with other specified complication, with long-term current use of insulin (H)   Iron deficiency anemia, unspecified iron deficiency anemia type   Thrombocytopenia (H24)   Hypomagnesemia   Hyponatremia       Allergies:   Allergies   Allergen Reactions    Seasonal Allergies     Beta Adrenergic Blockers        Code Status: Full Code    Activity level - Baseline/Home:  standby.  Activity Level - Current:   assist of 2.   Lift room needed: No.   Bariatric: No   Needed: No   Isolation: No.   Infection: Not Applicable.     Respiratory status: Room air    Vital Signs (within 30 minutes):   Vitals:    01/20/24 1843 01/20/24 1845 01/20/24 1900 01/20/24 2123   BP:  107/58 108/58    Pulse: 106 105  94   Resp: 16 20     Temp:       TempSrc:       SpO2: 98% 97%         Cardiac Rhythm:  ,   Cardiac  Cardiac Rhythm: Normal sinus rhythm  Pain level:    Patient confused: No.   Patient Falls Risk: nonskid shoes/slippers when out of bed, arm band in place, patient and family education, assistive device/personal items within reach, activity supervised, and mobility aid in reach.   Elimination Status: Has voided     Patient Report - Initial Complaint: Generalized weakness.   Focused Assessment: Austin Garza is a pleasant 83 year old male presenting with generalized weakness.       Patient has a history of type 2 diabetes, chronic lymphocytic leukemia, cancer of transverse colon, iron deficiency anemia, cardiomyopathy, thrombocytopenia, paroxysmal atrial fibrillation among others.      Patient complains of increasing weakness over the last week, getting especially worse today. He notes that he hasn't been eating and drinking much because he isn't hungry.  No recent falls.  The patient is finding he  cannot walk around the house due to feeling too weak.  No specific numbness or weakness of any extremity.     He does have loose stools following his partial colectomy and reanastomosis in September 2023. The patient denies chest pain, shortness of breath, fever, chills, nausea, vomiting, abdominal pain, dysuria, difficulty urinating, diarrhea, hematochezia, recent falls. The patient denies blood clots, recent travel, recent time in hospital.      Abnormal Results:   Labs Ordered and Resulted from Time of ED Arrival to Time of ED Departure   MAGNESIUM - Abnormal       Result Value    Magnesium 1.3 (*)    TROPONIN T, HIGH SENSITIVITY - Abnormal    Troponin T, High Sensitivity 23 (*)    CBC WITH PLATELETS - Abnormal    WBC Count 11.1 (*)     RBC Count 2.99 (*)     Hemoglobin 8.6 (*)     Hematocrit 26.8 (*)     MCV 90      MCH 28.8      MCHC 32.1      RDW 16.9 (*)     Platelet Count 95 (*)    BASIC METABOLIC PANEL - Abnormal    Sodium 128 (*)     Potassium 4.5      Chloride 92 (*)     Carbon Dioxide (CO2) 22      Anion Gap 14      Urea Nitrogen 29.4 (*)     Creatinine 1.53 (*)     GFR Estimate 45 (*)     Calcium 9.1      Glucose 148 (*)    ROUTINE UA WITH MICROSCOPIC REFLEX TO CULTURE - Abnormal    Color Urine Yellow      Appearance Urine Slightly Cloudy (*)     Glucose Urine Negative      Bilirubin Urine Negative      Ketones Urine Negative      Specific Gravity Urine 1.018      Blood Urine Negative      pH Urine 5.0      Protein Albumin Urine 30 (*)     Urobilinogen Urine Normal      Nitrite Urine Negative      Leukocyte Esterase Urine Negative      Bacteria Urine Few (*)     Mucus Urine Present (*)     RBC Urine 1      WBC Urine 2      Squamous Epithelials Urine <1      Renal Tubular Epithelials Urine 2 (*)    TROPONIN T, HIGH SENSITIVITY - Abnormal    Troponin T, High Sensitivity 23 (*)    HEMOGLOBIN A1C - Abnormal    Hemoglobin A1C 6.9 (*)    INFLUENZA A/B, RSV, & SARS-COV2 PCR - Normal    Influenza A PCR Negative       Influenza B PCR Negative      RSV PCR Negative      SARS CoV2 PCR Negative     LACTIC ACID WHOLE BLOOD - Normal    Lactic Acid 0.9     GLUCOSE MONITOR NURSING POCT        XR Chest 2 Views   Final Result   IMPRESSION: The lungs are clear. There is no pleural effusion or pneumothorax. The cardiomediastinal silhouette is normal. The limited visualized portions of the upper abdomen are grossly normal.             Treatments provided: see MAR  Family Comments:   OBS brochure/video discussed/provided to patient:  Yes  ED Medications:   Medications   aspirin EC tablet 81 mg (has no administration in time range)   metoprolol succinate ER (TOPROL XL) 24 hr tablet 50 mg (has no administration in time range)   pantoprazole (PROTONIX) EC tablet 40 mg (has no administration in time range)   predniSONE (DELTASONE) tablet 7.5 mg (has no administration in time range)   rosuvastatin (CRESTOR) tablet 20 mg (has no administration in time range)   senna-docusate (SENOKOT-S/PERICOLACE) 8.6-50 MG per tablet 1 tablet (has no administration in time range)     Or   senna-docusate (SENOKOT-S/PERICOLACE) 8.6-50 MG per tablet 2 tablet (has no administration in time range)   ondansetron (ZOFRAN ODT) ODT tab 4 mg (has no administration in time range)     Or   ondansetron (ZOFRAN) injection 4 mg (has no administration in time range)   glucose gel 15-30 g (has no administration in time range)     Or   dextrose 50 % injection 25-50 mL (has no administration in time range)     Or   glucagon injection 1 mg (has no administration in time range)   insulin glargine (LANTUS PEN) injection 10 Units (has no administration in time range)   insulin aspart (NovoLOG) injection (RAPID ACTING) (has no administration in time range)   insulin aspart (NovoLOG) injection (RAPID ACTING) (has no administration in time range)   sodium chloride 0.9 % infusion (has no administration in time range)   magnesium sulfate 2 g in 50 mL sterile water intermittent infusion  (0 g Intravenous Stopped 1/20/24 2103)   sodium chloride 0.9% BOLUS 1,000 mL (0 mLs Intravenous Stopped 1/20/24 2149)       Drips infusing:  Yes  For the majority of the shift this patient was Green.   Interventions performed were N/a.    Sepsis treatment initiated: No    Cares/treatment/interventions/medications to be completed following ED care:     ED Nurse Name: Geri Luu RN  10:07 PM    RECEIVING UNIT ED HANDOFF REVIEW    Above ED Nurse Handoff Report was reviewed: Yes  Reviewed by: January Miranda RN on January 20, 2024 at 11:33 PM

## 2024-01-21 NOTE — PROGRESS NOTES
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      Weakness  Deconditioning  Moderate hypotonic hyponatremia  Symptomatic anemia   Progressively worsening over the last few weeks. Labs in the ED were pretty unremarkable apart from a mild NINA and some electrolyte abnormalities. Also has had a pretty poor appetite during that time.  No other real illness symptoms.  Denies fevers, chills, night sweats, respiratory symptoms.  Does have some loose stools, but this is chronic following colectomy due to colon cancer.  CT of the abdomen and pelvis from 1/17 showed interval development of likely metastatic liver lesions. Progression of cancer could explain patient's presentation, would recommend oncology for further input.  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 Per most recent oncology note, patient declined adjuvant chemotherapy and elected for observation and surveillance of disease.  Regarding CLL/SLL, no current therapies, under surveillance as well.  Regarding SCC and BSC, has had multiple cancerous lesions removed, follows with dermatology.  Oncology Ordered repeat CT CAP which was done 1/17 and unfortunately showed multiple masses of the liver consistent with new liver metastatic disease  - oncology consulted: Dr. Badillo had goals of care discussion, family would like to pursue hospice   - palliative care consult for 1/22  - spiritual health consult      Chronic steroid use  Possible adrenal insufficiency  Currently has a steroid taper ongoing.  Per taper recommendations, currently taking 7.5 mg daily.  Will continue for now. Next dose adjustment is decreased to 5 mg daily on 1/26.  - Continue prednisone 7.5mg daily through 1/25     Diabetes mellitus type 2  - continue pta lantus 10 units daily, dulaglutide and metformin   - sliding scale insulin PRN     Atrial fibrillation, previously on anticoagulation, now in sinus rhythm  Had A-fib with RVR during hospitalization last year.  Underwent DCCV with conversion to SR. Was on anticoagulation following DCCV, but is no longer anticoagulated  - Continue PTA metoprolol succinate     Hypertension  - Continue metoprolol to 50 mg BID  - holding losartan due to NINA    CAD s/p PCI x4 in 2021  Has had little follow-up with cardiology after this procedure.   - Continuing ASA, statin, metoprolol,  - holding losartan due to NINA     NINA on chronic kidney disease - improving   Baseline creatinine around 1.1-1.3.  Creatinine was 1.53 on admission.  Patient does report poor p.o. intake due to progressively worsening weakness, fatigue, poor appetite.  Received IV fluids, will recheck in the morning.  - Repeat BMP in a.m.     Hyponatremia  Hypomagnesemia  Hypocalcemia  Likely hypovolemic hyponatremia given poor p.o. intake reported by patient.  Other electrolytes likely low due to the same issue.  Will replace per RN driven protocols.  Will monitor sodium on daily labs.     Leukocytosis - resolved   Anemia of chronic disease  Chronic thrombocytopenia  Platelets appear to be at baseline.  Platelets and hemoglobin appear to be  "at baseline.  White blood cell count is mildly elevated at 11.1, most recently has been within normal limits.  Not describing any illness symptoms apart from weakness and deconditioning.    - peripheral smear pending      Hyperlipidemia  - continue pta rosuvastatin     Abdominal aortic aneurysm  Measured up to 7.6 cm on CT.  Underwent EVAR in 2017.  Complicated by dissection of SMA, underwent embolization with IR. CT on 1/17 shows stable aneursym sac at 7.5 cm.  - Continue follow-up with vascular surgery       Observation Goals: -diagnostic tests and consults completed and resulted, -vital signs normal or at patient baseline, -tolerating oral intake to maintain hydration, -safe disposition plan has been identified, Nurse to notify provider when observation goals have been met and patient is ready for discharge.  Diet: Regular Diet Adult    DVT Prophylaxis: Pneumatic Compression Devices  Siddiqui Catheter: Not present  Lines: None     Cardiac Monitoring: None  Code Status: Full Code      Clinically Significant Risk Factors Present on Admission         # Hyponatremia: Lowest Na = 127 mmol/L in last 2 days, will monitor as appropriate    # Hypomagnesemia: Lowest Mg = 1.3 mg/dL in last 2 days, will replace as needed   # Hypoalbuminemia: Lowest albumin = 2.8 g/dL at 1/21/2024  7:56 AM, will monitor as appropriate   # Drug Induced Platelet Defect: home medication list includes an antiplatelet medication   # Hypertension: Noted on problem list     # DMII: A1C = 6.9 % (Ref range: <5.7 %) within past 6 months    # Overweight: Estimated body mass index is 25.75 kg/m  as calculated from the following:    Height as of 12/27/23: 1.753 m (5' 9\").    Weight as of this encounter: 79.1 kg (174 lb 6.4 oz).              Disposition Plan pending palliative care consult      Expected Discharge Date: 01/21/2024                  The patient's care was discussed with the Bedside Nurse, Care Coordinator/, and Patient.    COLTEN " JESSICA Price  Hospitalist Service  [facility]  Securely message with [employer] (more info)  Text page via [facility] Paging/Directory   ______________________________________________________________________    Interval History   Weakness has been worsening over the last month. Has been experiencing dizziness upon standing for some time but worse over the last week. Also reports SOB with exertion worse over the last week. Denies any black stools, or bleeding.    Physical Exam   Vital Signs: Temp: 98.6  F (37  C) Temp src: Oral BP: 96/49 Pulse: 95   Resp: 18 SpO2: 97 % O2 Device: None (Room air)    Weight: 174 lbs 6.4 oz    GENERAL:  Alert, Comfortable, No acute distress. Laying in bed.   PSYCH: pleasant, oriented.  HEENT:  Normocephalic, No scleral icterus or conjunctival injection  HEART:  Normal S1, S2 with no murmur, RRR  LUNGS:  Normal Respiratory effort. Clear to auscultation bilaterally with no wheezing, rales or ronchi.  ABDOMEN:  Soft, RUQ tenderness, non distended. No peritoneal signs.   SKIN:  Warm, dry to touch. No rash.  NEUROLOGIC:  Speech clear, alert & orientated, no focal deficits.     Medical Decision Making       MANAGEMENT DISCUSSED with the following over the past 24 hours: patient, nurse, SW   NOTE(S)/MEDICAL RECORDS REVIEWED over the past 24 hours: labs, imaging, progress       Data     I have personally reviewed the following data over the past 24 hrs:    7.5  \   6.6 (LL)   / 69 (L)     127 (L) 96 (L) 26.2 (H) /  129 (H)   4.1 20 (L) 1.40 (H) \     ALT: 90 (H) AST: 153 (H) AP: 209 (H) TBILI: 0.9   ALB: 2.8 (L) TOT PROTEIN: 5.4 (L) LIPASE: N/A     Trop: 23 (H) BNP: N/A     TSH: N/A T4: N/A A1C: 6.9 (H)     Procal: N/A CRP: N/A Lactic Acid: 0.9         Imaging results reviewed over the past 24 hrs:   Recent Results (from the past 24 hour(s))   XR Chest 2 Views    Narrative    EXAM: XR CHEST 2 VIEWS  LOCATION: [facility]  DATE: 1/20/2024    INDICATION:  generalized weakness  COMPARISON: 01/17/2024.     Impression    IMPRESSION: The lungs are clear. There is no pleural effusion or pneumothorax. The cardiomediastinal silhouette is normal. The limited visualized portions of the upper abdomen are grossly normal.

## 2024-01-21 NOTE — PHARMACY-ADMISSION MEDICATION HISTORY
Pharmacy Intern Admission Medication History    Admission medication history is complete. The information provided in this note is only as accurate as the sources available at the time of the update.    Information Source(s): Family member via phone    Pertinent Information: Dose for HUMALOG KWIKPEN is unknown. Pt wife states pt is taking Metoprolol ER 50 mg however prior fill history shows Metoprolol  mg daily. Pt is on prednisone taper, currently taking 3 tablets (2.5 mg) once daily; taper instructions are listed below.    Changes made to PTA medication list:  Added: Prednisone, leflunomide   Deleted: oxycodone, novolog pen  Changed: Metoprolol     Allergies reviewed with patient and updates made in EHR: yes    Medication History Completed By: Sandi Bentley 1/20/2024 10:30 PM    PTA Med List   Medication Sig Last Dose    aspirin 81 MG EC tablet Take 1 tablet (81 mg) by mouth daily 1/20/2024    Dulaglutide 4.5 MG/0.5ML SOPN Inject 4.5 mg Subcutaneous once a week 1/15/2024    HUMALOG KWIKPEN 100 UNIT/ML soln Sliding Scale insulin dosing:  For Blood glucose 141-180 mg/dL administer 2 units, 181-220 mg/dL administer 4 units, 221-260 mg/dL administer 6 units, 261-300 mg/dL administer 8 units, 301-350 mg/dL administer 10 units, 350-400 mg/dL administer 12 units, >400 mg/dL administer 14 units 1/20/2024    insulin glargine (LANTUS PEN) 100 UNIT/ML pen Inject 10 Units Subcutaneous every morning (before breakfast) 1/20/2024    leflunomide (ARAVA) 10 MG tablet Take 10 mg by mouth daily NOT STARTED Unknown    losartan (COZAAR) 25 MG tablet Take 1 tablet (25 mg) by mouth daily 1/20/2024    metFORMIN (GLUCOPHAGE XR) 500 MG 24 hr tablet Take 2 tablets (1,000 mg) by mouth daily (with dinner) 1/20/2024    metoprolol succinate ER (TOPROL XL) 100 MG 24 hr tablet Take 50 mg by mouth daily 1/20/2024    nitroGLYcerin (NITROSTAT) 0.4 MG sublingual tablet One tablet under the tongue every 5 minutes if needed for chest pain. May  "repeat every 5 minutes for a maximum of 3 doses in 15 minutes\" Unknown    pantoprazole (PROTONIX) 40 MG EC tablet Take 1 tablet (40 mg) by mouth daily 1/20/2024    predniSONE (DELTASONE) 2.5 MG tablet Take by mouth daily 3 tablets daily x 2 weeks (1/12-1/25), 2 tablets daily x 2 weeks (1/26-2/9), 1 tablets daily x 2 weeks (2/10-2/24)  Then stop 1/20/2024    rosuvastatin (CRESTOR) 20 MG tablet Take 1 tablet (20 mg) by mouth daily 1/20/2024    thiamine (B-1) 100 MG tablet Take 1 tablet (100 mg) by mouth daily 1/20/2024      "

## 2024-01-21 NOTE — PLAN OF CARE
ROOM # 206-2      Living Situation: w wife  [facility] name:  [facility]    Activity level at baseline: Cane  Activity level on admit: Ax1 walker GB    Who will be transporting you at discharge: Wife    Patient registered to observation; given Patient Bill of Rights; given the opportunity to ask questions about observation status and their plan of care.  Patient has been oriented to the observation room, bathroom and call light is in place.    Discussed discharge goals and expectations with patient/family.

## 2024-01-21 NOTE — PLAN OF CARE
PRIMARY DIAGNOSIS: Weakness/ Hyponatremia   OUTPATIENT/OBSERVATION GOALS TO BE MET BEFORE DISCHARGE:  1. Pain Status: Pain free.    2. Return to near baseline physical activity: No    3. Cleared for discharge by consultants (if involved): No    Discharge Planner Nurse   Safe discharge environment identified: Yes  Barriers to discharge: Yes         Vitals are Temp: 98.7  F (37.1  C) Temp src: Oral BP: 125/57 Pulse: 102   Resp: 18 SpO2: 96 %.  Patient is Alert and Oriented x4 but forgetful. They are 1 Assist with Gait Belt and Walker.  Pt is a Regular diet, carb count.  They are denying pain.  Patient has Normal Saline 0.9% running at 75 mL per hour. Mag replacement protocol. BS checks. PT/OT/Hem/Onco consults.     Please review provider order for any additional goals.   Nurse to notify provider when observation goals have been met and patient is ready for discharge.

## 2024-01-22 NOTE — PLAN OF CARE
PRIMARY DIAGNOSIS: GENERALIZED WEAKNESS   OUTPATIENT/OBSERVATION GOALS TO BE MET BEFORE DISCHARGE:  ADLs back to baseline: No    Activity and level of assistance: Up with standby assistance.    Pain status: Improved with use of alternative comfort measures i.e.: positioning    Return to near baseline physical activity: Yes     Discharge Planner Nurse   Safe discharge environment identified: Yes  Barriers to discharge: Yes       Entered by: Jazmine Tello RN 01/22/2024      A&Ox4. Up w/ SBA w/ gait belt and cane. Continues blood glucose checks with sliding scale insulin. BS active x4, tolerating diet, fair appetite, passing flatus. Last BM 1/21. Voiding in adequate amt's. SL. Palliative, Hem/Onc, Spiritual Health consulted, see notes. Patient states wife plans to visit at bedside later this AM.     Please review provider order for any additional goals.   Nurse to notify provider when observation goals have been met and patient is ready for discharge.

## 2024-01-22 NOTE — PLAN OF CARE
PRIMARY DIAGNOSIS: GENERALIZED WEAKNESS/FTT    OUTPATIENT/OBSERVATION GOALS TO BE MET BEFORE DISCHARGE  1. Orthostatic performed: N/A    2. Tolerating PO medications: Yes    3. Return to near baseline physical activity: No    4. Cleared for discharge by consultants (if involved): No    Discharge Planner Nurse   Safe discharge environment identified: No  Barriers to discharge: Yes       Entered by: Lili Singh RN 01/22/2024 4:34 AM     Please review provider order for any additional goals.   Nurse to notify provider when observation goals have been met and patient is ready for discharge.    Vitals are Temp: 98.3  F (36.8  C) Temp src: Oral BP: 131/66 Pulse: 104   Resp: 18 SpO2: 98 %.  Patient is Alert and Oriented x4 but forgetful. They are 1 Assist with Gait Belt and Cane.  Pt is a Regular diet.  They are denying pain.  Patient has Normal Saline 0.9% running at 100 mL per hour. Denies nausea/dizziness/SOB. Hemoglobin 6.6 and 7.1, recheck CBC in the morning. On magnesium replacement protocol, to be rechecked in the morning. Hem/onc and SW following. Plan is for palliative consult.

## 2024-01-22 NOTE — PLAN OF CARE
"PRIMARY DIAGNOSIS: GENERALIZED WEAKNESS    OUTPATIENT/OBSERVATION GOALS TO BE MET BEFORE DISCHARGE  1. Orthostatic performed: N/A    2. Tolerating PO medications: Yes    3. Return to near baseline physical activity: Yes    4. Cleared for discharge by consultants (if involved): No    Pt moved to room 228 from 206-2 due to request per pt and family for a private room,  pt received information of diagnosis that \"is a lot to take in\"  per patient and spouse.  Pt has been very pleasant and cooperative.  Up with sba cane to bathroom.  Vitals are stable.  Followed by hem/onc for future planning and treatments.    Discharge Planner Nurse   Safe discharge environment identified: Yes  Barriers to discharge:  await SW assist for possible hospice planning         Entered by: Betty Moore RN 01/21/2024 7:14 PM     Please review provider order for any additional goals.   Nurse to notify provider when observation goals have been met and patient is ready for discharge.                        "

## 2024-01-22 NOTE — PLAN OF CARE
PRIMARY DIAGNOSIS: GENERALIZED WEAKNESS/FTT    OUTPATIENT/OBSERVATION GOALS TO BE MET BEFORE DISCHARGE  1. Orthostatic performed: N/A    2. Tolerating PO medications: Yes    3. Return to near baseline physical activity: No    4. Cleared for discharge by consultants (if involved): No    Discharge Planner Nurse   Safe discharge environment identified: No  Barriers to discharge: Yes       Entered by: Lili Singh RN 01/22/2024 2:20 AM     Please review provider order for any additional goals.   Nurse to notify provider when observation goals have been met and patient is ready for discharge.    Vitals are Temp: 98.3  F (36.8  C) Temp src: Oral BP: 129/65 Pulse: 99   Resp: 18 SpO2: 98 %.  Patient is Alert and Oriented x4 but forgetful. They are 1 Assist with Gait Belt and Cane.  Pt is a Regular diet.  They are denying pain.  Patient has Normal Saline 0.9% running at 100 mL per hour. Denies nausea/dizziness/SOB. Hemoglobin 6.6 and 7.1, recheck CBC tomorrow morning. On magnesium replacement protocol, to be rechecked in the morning. Hem/onc and SW following. Plan is for palliative consult.

## 2024-01-22 NOTE — PLAN OF CARE
PRIMARY DIAGNOSIS: GENERALIZED WEAKNESS   OUTPATIENT/OBSERVATION GOALS TO BE MET BEFORE DISCHARGE:    ADLs back to baseline: Yes    Activity and level of assistance: Up with standby assistance w/ gait belt and cane    Pain status: Improved with use of alternative comfort measures i.e.: R-sided abdominal pain, declines interventions when offered.     Return to near baseline physical activity: Yes     Discharge Planner Nurse   Safe discharge environment identified: Yes  Barriers to discharge: Yes       Entered by: Jazmine Tello RN 01/22/2024      A&Ox4. Up w/ SBA w/ gait belt and cane. Continues blood glucose checks with sliding scale insulin. BS active x4, tolerating diet, fair appetite, passing flatus. Last BM 1/21. Voiding in adequate amt's. SL. PT, Hem/Onc, Spiritual Health consulted, see notes.     Please review provider order for any additional goals.   Nurse to notify provider when observation goals have been met and patient is ready for discharge.

## 2024-01-22 NOTE — PLAN OF CARE
PRIMARY DIAGNOSIS: GENERALIZED WEAKNESS/FTT    OUTPATIENT/OBSERVATION GOALS TO BE MET BEFORE DISCHARGE  1. Orthostatic performed: N/A    2. Tolerating PO medications: Yes    3. Return to near baseline physical activity: No    4. Cleared for discharge by consultants (if involved): No    Discharge Planner Nurse   Safe discharge environment identified: No  Barriers to discharge: Yes       Entered by: Lili Singh RN 01/22/2024 2:24 AM     Please review provider order for any additional goals.   Nurse to notify provider when observation goals have been met and patient is ready for discharge.    Vitals are Temp: 98.3  F (36.8  C) Temp src: Oral BP: 129/65 Pulse: 99   Resp: 18 SpO2: 98 %.  Patient is Alert and Oriented x4 but forgetful. They are 1 Assist with Gait Belt and Cane.  Pt is a Regular diet.  They are denying pain.  Patient has Normal Saline 0.9% running at 100 mL per hour. Denies nausea/dizziness/SOB. Hemoglobin 6.6 and 7.1, recheck CBC in the morning. On magnesium replacement protocol, to be rechecked in the morning. Hem/onc and SW following. Plan is for palliative consult.

## 2024-01-22 NOTE — PLAN OF CARE
PRIMARY DIAGNOSIS: GENERALIZED WEAKNESS   OUTPATIENT/OBSERVATION GOALS TO BE MET BEFORE DISCHARGE:  ADLs back to baseline: No    Activity and level of assistance: Up with standby assistance.    Pain status: Improved with use of alternative comfort measures i.e.: positioning    Return to near baseline physical activity: Yes     Discharge Planner Nurse   Safe discharge environment identified: Yes  Barriers to discharge: Yes       Entered by: Jazmine Tello RN 01/22/2024      A&Ox4. Up w/ SBA w/ gait belt and cane. Continues blood glucose checks with sliding scale insulin. BS active x4, tolerating diet, fair appetite, passing flatus. Last BM 1/21. Voiding in adequate amt's. SL. Palliative, Hem/Onc, Spiritual Health consulted, see notes. Wife at bedside.     Please review provider order for any additional goals.   Nurse to notify provider when observation goals have been met and patient is ready for discharge.

## 2024-01-22 NOTE — CONSULTS
"SPIRITUAL HEALTH SERVICES - Consult Note   Observation Unit  Referral Source/Reason for Visit: [facility] consult    Summary and Recommendations -  Pt's spouse Marcie acknowledged feeling \"overwhelmed\" by the news they received yesterday regarding pt's poor prognosis and by the recommendation of Hospice.  She benefits by verbally processing her thoughts and feelings.  Pt is not Buddhism, and Marcie is Voodoo but has not attended Spiritism services since the pandemic.    Plan: No further plans at this time as pt anticipates discharging tomorrow.    Omar Tena M.Div., [facility]  Staff     SHS available 24/7 for emergent requests/referrals, either by paging the on-call  or by entering an ASAP/STAT consult in [facility], which will also page the on-call .    Assessment    Saw pt Austin \"Benji\" Greg per [facility] consult.  His spouse Marcie was at the bedside.    Patient/Family Understanding of Illness and Goals of Care -   Benji reported that he has a history of different types of skin cancer and recently had a colectomy for colon cancer.  He and Marcie just learned yesterday that Benji has a poor prognosis.  Benji reviewed that he has two treatment options: chemo \"to extend his life a little longer\" or Hospice.    Distress and Loss -   Marcie acknowledged feeling overwhelmed by yesterday's news and shared that she and Benji need more time and more information to make a decision about the direction of his care.  She mentioned that her brother (her only sibling) is being treated for kidney cancer and his spouse has early stages of dementia.    Strengths, Coping, and Resources - Benji and Marcie have a limited support network that includes Benji's sister, Marcie's brother, and Marcie's two friends.  Only Marcie's brother lives locally and, as mentioned above, has his own health problems.  They have no children.    Meaning, Beliefs, and Spirituality - Benji is not Buddhism or spiritual.  Marcie is Voodoo but has not attended " Orthodox services since the pandemic.  She welcomed prayer.

## 2024-01-22 NOTE — CONSULTS
"Palliative Care Consultation Note  [facility]      Patient: Austin Garza  Date of Admission:  1/20/2024    Requesting Clinician / Team:  hospitalist  Reason for consult: Goals of care  Decisional support  Patient and family support       Recommendations & Counseling     GOALS OF CARE:   Restorative without limits   Wife would like 2nd opinion on treatment for his cancer  Patient would like to be considered for chemotherapy    ADVANCE CARE PLANNING:  Patient has an advance directive dated 2/3/11.  Primary Health Care Agent Marcie Garza.  Alternate(s) Mariaelena Juarez.   There is no POLST form on file, defer to patient and/or next of kin for decisions   Code status: Full Code    MEDICAL MANAGEMENT:   #General Weakness/Debility   Wife requesting a PT evaluation before being discharged  Appreciate the input of therapies for recommendations    PSYCHOSOCIAL/SPIRITUAL SUPPORT:  Family   [place] community: \"I am a  I do not have those beliefs\" Wife requested a  visit today- consult placed    Palliative Care will continue to follow. Thank you for the consult and allowing us to aid in the care of Austin Garza.    These recommendations have been discussed with medical team.    Sandi Sotelo NP  Securely message with Vocera (more info)  Text page via [facility] Paging/Directory       Assessment      Austin Garza is a 83 year old male with a past medical history of HTN, GERD< CLL, colon cancer, basal cell squamos cell skin cancer, DM2, CAD, CKD,  who presented on 1/20 with worsening weakness.      Today, the patient was seen for:  Goals of care  Decisional and emotional support    Palliative Care Summary:   Met with patient and wife at the bedside..   I introduced our role as an extra layer of support and how we help patients and families dealing with serious, potentially life-limiting illnesses. I explained the composition of the palliative care team.  Palliative care helps " patients and families navigate their care while focusing on the whole person; providing emotional, social and spiritual support  Palliative care often assists with symptom management, information sharing about what to expect from the illness, available treatment options and what effect those options may have on the disease course, and provide effective communication and caring support.    Prognosis, Goals, & Planning:    Functional Status just prior to this current hospitalization:  Overall weakness and not eating well    Prognosis, Goals, and/or Advance Care Planning:  Discussed what continuing restorative/life-prolonging care entails, including continued (re)admissions to the hospital, continuing with preventative and primary care, seeing disease/organ specific specialty consultations for medical treatments in hopes to prolong life for as long as possible.    Education provided regarding hospice philosophy, prognostic,and eligibility criteria. Discussed what services are provided and those that are not,  Discussed common misconceptions. We explored the various disposition options where they can receive hospice care (home, residential hospice homes, LTC with hospice) including subsequent financial and familial implications. Discussed typical anticipated timing of discharge.    Code Status was addressed today:   Yes, We discussed potential risks and rationale of attempting cardiac resuscitation, intubation, and mechanical ventilation.  We also discussed probability of survival as well as quality of life implications.  Based on this discussion, patient or surrogate response/decision: full code          Patient has decision-making capacity today for complex decisions:Intact            Coping, Meaning, & Spirituality:   Mood, coping, and/or meaning in the context of serious illness were addressed today: Yes    Social:   Living situation:lives with significant other/spouse    Medications:  I have reviewed this patient's  medication profile and medications from this hospitalization. Notable medications: none     ROS:  Comprehensive ROS is reviewed and is negative except as here & per HPI:     Physical Exam   Vital Signs with Ranges  Temp:  [97.4  F (36.3  C)-98.3  F (36.8  C)] 98.3  F (36.8  C)  Pulse:  [] 104  Resp:  [18-24] 18  BP: ()/(57-68) 131/66  SpO2:  [97 %-99 %] 98 %  174 lbs 6.4 oz    PHYSICAL EXAM:  Constitutional: alert and no distress   Cardiovascular: negative  Respiratory: negative  Psychiatric: mentation appears normal and affect normal/bright  Abdomen: Abdomen soft, non-tender. BS normal. No masses, organomegaly    Data reviewed:  Results for orders placed or performed during the hospital encounter of 01/20/24 (from the past 24 hour(s))   Glucose by meter   Result Value Ref Range    GLUCOSE BY METER POCT 129 (H) 70 - 99 mg/dL   ABO/Rh type and screen    Narrative    The following orders were created for panel order ABO/Rh type and screen.  Procedure                               Abnormality         Status                     ---------                               -----------         ------                     Adult Type and Screen[[id number]]                            Final result                 Please view results for these tests on the individual orders.   Lab Blood Morphology Pathologist Review    Narrative    The following orders were created for panel order Lab Blood Morphology Pathologist Review.  Procedure                               Abnormality         Status                     ---------                               -----------         ------                     Bld morphology pathology...[[id number]]                      In process                 CBC with platelets and d...[[id number]]  Abnormal            Final result               Reticulocyte count[[id number]]           Normal              Final result               Morphology Tracking[[id number]]                              Final result                  Please view results for these tests on the individual orders.   CBC with platelets and differential   Result Value Ref Range    WBC Count 6.7 4.0 - 11.0 10e3/uL    RBC Count 2.42 (L) 4.40 - 5.90 10e6/uL    Hemoglobin 7.1 (L) 13.3 - 17.7 g/dL    Hematocrit 22.1 (L) 40.0 - 53.0 %    MCV 91 78 - 100 fL    MCH 29.3 26.5 - 33.0 pg    MCHC 32.1 31.5 - 36.5 g/dL    RDW 17.0 (H) 10.0 - 15.0 %    Platelet Count 64 (L) 150 - 450 10e3/uL    % Neutrophils 74 %    % Lymphocytes 20 %    % Monocytes 5 %    % Eosinophils 0 %    % Basophils 0 %    % Immature Granulocytes 1 %    NRBCs per 100 WBC 0 <1 /100    Absolute Neutrophils 4.9 1.6 - 8.3 10e3/uL    Absolute Lymphocytes 1.4 0.8 - 5.3 10e3/uL    Absolute Monocytes 0.4 0.0 - 1.3 10e3/uL    Absolute Eosinophils 0.0 0.0 - 0.7 10e3/uL    Absolute Basophils 0.0 0.0 - 0.2 10e3/uL    Absolute Immature Granulocytes 0.1 <=0.4 10e3/uL    Absolute NRBCs 0.0 10e3/uL   Reticulocyte count   Result Value Ref Range    % Reticulocyte 1.6 0.5 - 2.0 %    Absolute Reticulocyte 0.038 0.025 - 0.095 10e6/uL   Adult Type and Screen   Result Value Ref Range    ABO/RH(D) AB POS     Antibody Screen Negative Negative    SPECIMEN EXPIRATION DATE [id number]    Prepare red blood cells (unit)   Result Value Ref Range    Blood Component Type Red Blood Cells     Product Code Q0249C53     Unit Status Transfused     Unit Number [id number]     CROSSMATCH Compatible     CODING SYSTEM [id number]     ISSUE DATE AND TIME [id number]     UNIT ABO/RH AB+     UNIT TYPE ISBT 8400    Glucose by meter   Result Value Ref Range    GLUCOSE BY METER POCT 210 (H) 70 - 99 mg/dL   Sodium   Result Value Ref Range    Sodium 128 (L) 135 - 145 mmol/L   Glucose by meter   Result Value Ref Range    GLUCOSE BY METER POCT 237 (H) 70 - 99 mg/dL   Glucose by meter   Result Value Ref Range    GLUCOSE BY METER POCT 234 (H) 70 - 99 mg/dL   Glucose by meter   Result Value Ref Range    GLUCOSE BY METER POCT 166 (H) 70 - 99  mg/dL   CBC with platelets   Result Value Ref Range    WBC Count 5.4 4.0 - 11.0 10e3/uL    RBC Count 2.66 (L) 4.40 - 5.90 10e6/uL    Hemoglobin 7.6 (L) 13.3 - 17.7 g/dL    Hematocrit 23.9 (L) 40.0 - 53.0 %    MCV 90 78 - 100 fL    MCH 28.6 26.5 - 33.0 pg    MCHC 31.8 31.5 - 36.5 g/dL    RDW 16.6 (H) 10.0 - 15.0 %    Platelet Count 68 (L) 150 - 450 10e3/uL     *Note: Due to a large number of results and/or encounters for the requested time period, some results have not been displayed. A complete set of results can be found in Results Review.       Medical Decision Making     90 MINUTES SPENT BY ME on the date of service doing chart review, history, exam, documentation & further activities per the note.

## 2024-01-22 NOTE — PROGRESS NOTES
[facility]    Hematology/Oncology Follow-up Note      Today's Date: 01/22/24  Date of Admission:  1/20/2024  Reason for Consult: Recurrent/metastatic colon cancer, CLL      ASSESSMENT/PLAN:  Austin Garza is a 83 year old male with:    Recurrent/metastatic colon cancer: S/p  right colectomy with ileocolic anastomosis in September 2023, margins negative, 0/26 lymph nodes.  Adjuvant chemotherapy was discussed, however due to comorbidities and risk of treatment, observation was chosen.  Now with recurrent and metastatic disease to the liver.  Dr. Badillo met with patient and his wife yesterday while inpatient to discuss the results may have felt pretty overwhelmed since the CT results were discussed with them.  They initially had discussed hospice with Dr. Badillo yesterday, but now that they have had a chance to think about it some more, they may consider restorative options with chemotherapy.  We briefly discussed toxicities, risks and benefits again today, however they would like to meet with Dr. Badillo outpatient next week to have a further discussion of next steps.  Palliative care was in the room as well.  -For now, hold on hospice consult  -Follow-up with Dr. Badillo outpatient sometime next week, our team will call to coordinate    Addendum: We would like a bx of the liver lesion and additional CARIS testing.  I spoke to IR and since he is on aspirin and received it today, the need to hold this for 5 days.  I put in an IR consult for biopsy, but this could be done outpatient, if needed, since he did take aspirin today.  I have updated the patient as well.    CLL: Confirmed by bone biopsy, ibrutinib has been on hold since August 2021 due to atrial fibrillation and Eliquis.  WBC has been stable and low end of normal.  Platelets have been fluctuating, dropping over the last month or so, 68 K today.    -He may need to start ibrutinib in the future, but no changes while inpatient.    Anemia: Previously  ALLISON vs ACD s/p Venofer x 3 in October 2023 with minimal response.  Hemoglobin 6.6 on 1/21/2024 s/p 1 unit of blood inpatient.  MCV has been normal.  -Added on STFR    FEN: Electrolyte imbalances and NINA.  Overall improved.  -Hospital team managing      Discussed with Dr Rivas and he agreed with the plan above.       INTERIM HISTORY: Alicja was seen in his room with his wife present.  He has been feeling a little bit better today.  Dr. Badillo met with him yesterday and they felt pretty overwhelmed with the CT results, especially his wife.  Hospice was discussed, but she felt she did not gather all the information.  She is hoping that PT/OT can meet with the patient today to determine if he is safe to go home.  She feels he may be too weak to go home directly.       MEDICATIONS:  Current Facility-Administered Medications   Medication    aspirin EC tablet 81 mg    glucose gel 15-30 g    Or    dextrose 50 % injection 25-50 mL    Or    glucagon injection 1 mg    insulin aspart (NovoLOG) injection (RAPID ACTING)    insulin aspart (NovoLOG) injection (RAPID ACTING)    insulin glargine (LANTUS PEN) injection 10 Units    metoprolol succinate ER (TOPROL XL) 24 hr tablet 50 mg    ondansetron (ZOFRAN ODT) ODT tab 4 mg    Or    ondansetron (ZOFRAN) injection 4 mg    pantoprazole (PROTONIX) EC tablet 40 mg    predniSONE (DELTASONE) tablet 7.5 mg    rosuvastatin (CRESTOR) tablet 20 mg    senna-docusate (SENOKOT-S/PERICOLACE) 8.6-50 MG per tablet 1 tablet    Or    senna-docusate (SENOKOT-S/PERICOLACE) 8.6-50 MG per tablet 2 tablet    sodium chloride 0.9% BOLUS 1-250 mL           ALLERGIES:  Allergies   Allergen Reactions    Seasonal Allergies     Beta Adrenergic Blockers          PHYSICAL EXAM:  Vital signs:  Temp: 98.7  F (37.1  C) Temp src: Oral BP: 118/58 Pulse: 94   Resp: 16 SpO2: 97 % O2 Device: None (Room air)     Weight: 79.1 kg (174 lb 6.4 oz)  Estimated body mass index is 25.75 kg/m  as calculated from the following:    Height  "as of 12/27/23: 1.753 m (5' 9\").    Weight as of this encounter: 79.1 kg (174 lb 6.4 oz).    GENERAL:  Male, in no acute distress.  Alert.  HEENT:  Normocephalic, atraumatic.   LUNGS:  Nonlabored breathing, no cough or audible wheezing, able to speak full sentences.  MSK: Full ROM UE.    SKIN: No rash on exposed skin.   NEURO: CN grossly intact, speech normal  PSYCH: Mentation appears normal, insight and judgement intact      LABS:  CBC RESULTS:   Recent Labs   Lab Test 01/22/24  0730   WBC 5.4   RBC 2.66*   HGB 7.6*   HCT 23.9*   MCV 90   MCH 28.6   MCHC 31.8   RDW 16.6*   PLT 68*       Recent Labs   Lab Test 01/22/24  0814 01/22/24  0730 01/21/24  1738 01/21/24  1418 01/21/24  0815 01/21/24  0756   NA  --  132*  --  128*  --  127*   POTASSIUM  --  4.1  --   --   --  4.1   CHLORIDE  --  102  --   --   --  96*   CO2  --  19*  --   --   --  20*   ANIONGAP  --  11  --   --   --  11   * 160*   < >  --    < > 124*   BUN  --  23.4*  --   --   --  26.2*   CR  --  1.22*  --   --   --  1.40*   MAGDALENO  --  8.0*  --   --   --  8.0*    < > = values in this interval not displayed.           Elizabeth Jerome PA-C  Hematology/Oncology  [facility]      "

## 2024-01-22 NOTE — PROGRESS NOTES
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      Weakness  Deconditioning  Moderate hypotonic hyponatremia  Symptomatic anemia   Progressively worsening over the last few weeks. Labs in the ED were pretty unremarkable apart from a mild NINA and some electrolyte abnormalities. Also has had a pretty poor appetite during that time.  No other real illness symptoms.  Denies fevers, chills, night sweats, respiratory symptoms.  Does have some loose stools, but this is chronic following colectomy due to colon cancer.  CT of the abdomen and pelvis from 1/17 showed interval development of likely metastatic liver lesions.  - s/p IVF with improvement in creatinine to baseline  - s/p 1 unit of prbc with now stable hgb levels  - Oncology/Palliative Care consults  - awaiting PT consult     New metastatic liver lesions  Adenocarcinoma of transverse colon laparoscopic, open extended right colectomy with ileocolic anastomosis  CLL/SLL, stage 3  SSC of the jaw s/p Moh's  Basal cell carcinoma  MGUS  Extensive cancer history and follows with oncology regularly as outpatient Regarding the colon cancer. Per most recent oncology note, patient declined adjuvant chemotherapy and elected for observation and surveillance of disease.  Regarding CLL/SLL, no current therapies, under surveillance as well.  Regarding SCC and BSC, has had multiple cancerous lesions removed, follows with dermatology. Oncology Ordered repeat CT CAP which was done 1/17 and unfortunately showed multiple masses of the liver consistent with new liver metastatic disease  - oncology  consulted: Dr. Badillo had goals of care discussion, family would like to pursue hospice as of 1/21/24   - palliative care consulted today 1/22/24 and family does not want hospice any longer and requesting PT consult  - PT consult  - SW consult for possible placement vs home cares  - Oncology follow up today; planning for outpatient follow up next week.     Chronic steroid use  Currently has a steroid taper ongoing.  Per taper recommendations, currently taking 7.5 mg daily.  Will continue for now. Next dose adjustment is decreased to 5 mg daily on 1/26.  - Continue prednisone 7.5mg daily through 1/25     Diabetes mellitus type 2  - continue pta lantus 10 units daily, dulaglutide and metformin   - sliding scale insulin PRN     Atrial fibrillation, previously on anticoagulation, now in sinus rhythm  Had A-fib with RVR during hospitalization last year.  Underwent DCCV with conversion to SR. Was on anticoagulation following DCCV, but is no longer anticoagulated  - Continue PTA metoprolol succinate     CAD s/p PCI x4 in 2021  HTN  Has had little follow-up with cardiology after this procedure.   - Continuing ASA, statin, metoprolol  - Losartan has been on hold due to NINA; likely resume upon discharge     NINA on chronic kidney disease, resolved  Baseline creatinine around 1.1-1.3.  Creatinine was 1.53 on admission due to poor oral intake recently.  Creatinine has improved to 1.22 after IVF.     Hyponatremia  Hypomagnesemia, resolved  Hypocalcemia, stable  Likely hypovolemic hyponatremia given poor p.o. intake reported by patient.  Other electrolytes likely low due to the same issue.  Will replace per RN driven protocols.  Will monitor sodium on daily labs.     Leukocytosis - resolved   Acute on Chronic Anemia of chronic disease  Chronic thrombocytopenia   Previously ALLISON vs ACD s/p Venofer x 3 in October 2023 with minimal response.  Hemoglobin 6.6 on 1/21/2024 s/p 1 unit of blood inpatient.   - no signs of active bleeding  -  hgb remains stable at 7.6  - follow up monitoring with Oncology      Hyperlipidemia  - continue pta rosuvastatin     Abdominal aortic aneurysm  Measured up to 7.6 cm on CT.  Underwent EVAR in 2017.  Complicated by dissection of SMA, underwent embolization with IR. CT on 1/17 shows stable aneursym sac at 7.5 cm.  - Continue outpatient follow-up with vascular surgery    CODE: Full  DVT: scd  Diet/fluids: regular  Disposition:  home vs TCU; awaiting PT consult      Vanessa DEE-C  Hospitalist Physician Assistant  [facility]      Subjective & Interval Hx:    Patient reports an ongoing poor appetite.  He denies any nausea or emesis overnight.  Has a RUQ pain that is worse with twisting motion and improves if he holds his side.  Patient reports his wife said she wants to get a second opinion of his care at the [facility].    Last 24 hr care team notes reviewed.   ROS:  4 point ROS including Respiratory, CV, GI and , other than that noted in the HPI, is negative.    Physical Exam:    Blood pressure 123/60, pulse 87, temperature 97.3  F (36.3  C), temperature source Oral, resp. rate 16, weight 79.1 kg (174 lb 6.4 oz), SpO2 97%.  General: Alert, interactive, NAD  HEENT: AT/NC  Resp: clear to auscultation bilaterally, no crackles or wheezes  Cardiac: regular rate and rhythm, no murmur  Abdomen: Soft, nontender, nondistended. +BS.   Extremities: No LE edema  Skin: Warm and dry  Neuro: Alert & oriented x 3, moves all extremities equally    Labs & Images:  Reviewed in Epic   Medications:    Current Facility-Administered Medications   Medication    aspirin EC tablet 81 mg    glucose gel 15-30 g    Or    dextrose 50 % injection 25-50 mL    Or    glucagon injection 1 mg    insulin aspart (NovoLOG) injection (RAPID ACTING)    insulin aspart (NovoLOG) injection (RAPID ACTING)    insulin glargine (LANTUS PEN) injection 10 Units    metoprolol succinate ER (TOPROL XL) 24 hr tablet 50 mg    ondansetron (ZOFRAN  ODT) ODT tab 4 mg    Or    ondansetron (ZOFRAN) injection 4 mg    pantoprazole (PROTONIX) EC tablet 40 mg    predniSONE (DELTASONE) tablet 7.5 mg    rosuvastatin (CRESTOR) tablet 20 mg    senna-docusate (SENOKOT-S/PERICOLACE) 8.6-50 MG per tablet 1 tablet    Or    senna-docusate (SENOKOT-S/PERICOLACE) 8.6-50 MG per tablet 2 tablet    sodium chloride 0.9% BOLUS 1-250 mL

## 2024-01-23 NOTE — PROGRESS NOTES
Care Management Follow Up    Length of Stay (days): 0    Expected Discharge Date: 01/23/2024     Concerns to be Addressed: discharge planning      Patient plan of care discussed at interdisciplinary rounds: Yes    Anticipated Discharge Disposition:  Home     Anticipated Discharge Services:  HC - RN, referral for informational hospice meeting with [facility]  Anticipated Discharge DME:      Patient/Family in Agreement with the Plan: yes     Referrals Placed by CM/SW:  home care/hospice  Private pay costs discussed: Not applicable    Additional Information:  SW following for discharge planning.     Updated by palliative care that pt's wife is requesting home care referral be sent to [facility]. Pt's wife is also requesting referral be sent to [facility] for an informational meeting next week.     HC referral sent for [facility] RN. PT assessed pt and recommending home, no further therapies. Call placed to [facility]. Referral faxed to f:[phone] and [facility] hospice liaison will contact pt's wife Marcie to coordinate and schedule an informational meeting. No hospice referral needed.     Met with Marcie and provided update on above information. Marcie is in agreement. Appears plan is to discharge home tomorrow. Marcie to provide transportation.    Social work will continue to follow and assist with discharge planning as needed.    TYE Fischer, W  Care Coordination  [phone]    TYE Ceja

## 2024-01-23 NOTE — PLAN OF CARE
PRIMARY DIAGNOSIS: WEAKNESS, Hyponatremia    OUTPATIENT/OBSERVATION GOALS TO BE MET BEFORE DISCHARGE  1. Orthostatic performed: No    2. Tolerating PO medications: Yes    3. Return to near baseline physical activity:  Up SBA with cane    4. Cleared for discharge by consultants (if involved): Yes    Discharge Planner Nurse   Safe discharge environment identified: Yes  Barriers to discharge: No       Entered by: Parvez Santos RN 01/22/2024 10:36 PM    Vitals are Temp: 98  F (36.7  C) Temp src: Oral BP: 107/61 Pulse: 76   Resp: 16 SpO2: 99 %.  Patient is Alert and Oriented x4 but forgetful. Regular diet, poor appetite. Denies pain. SIMMONS, chronic. PIV SL.  Pt seen by Hem/Onc, palliative, PT, and spiritual health. Plan for outpatient liver biopsy. Likely home tomorrow with wife.       Please review provider order for any additional goals.   Nurse to notify provider when observation goals have been met and patient is ready for discharge.

## 2024-01-23 NOTE — PROGRESS NOTES
"PALLIATIVE CARE PROGRESS NOTE  [facility]     Patient Name: Austin Garza  Date of Admission: 1/20/2024   Today the patient was seen for:   Emotional and decisional support  Goals of care     Recommendations & Counseling     GOALS OF CARE:   Restorative without limits   Wife would like 2nd opinion on treatment for his cancer  Patient would like to be considered for chemotherapy  Wife would like outpatient hospice referral for late next week with [facility].  She would also like a palliative outpatient referral     ADVANCE CARE PLANNING:  Patient has an advance directive dated 2/3/11.  Primary Health Care Agent Marcie Garza.  Alternate(s) Mariaelena Juarez.   There is no POLST form on file, defer to patient and/or next of kin for decisions   Code status: Full Code     MEDICAL MANAGEMENT:   #General Weakness/Debility   Wife requesting a PT evaluation before being discharged  Appreciate the input of therapies for recommendations     PSYCHOSOCIAL/SPIRITUAL SUPPORT:  Family   [place] community: \"I am a  I do not have those beliefs\"      Palliative Care will continue to follow. Thank you for the consult and allowing us to aid in the care of Austin Garza.    These recommendations have been discussed with medical team.    Sandi Sotelo NP  Securely message with [employer] (more info)  Text page via [facility] Paging/Directory      Assessments          Austin Garza is a 83 year old male with a past medical history of HTN, GERD< CLL, colon cancer, basal cell squamos cell skin cancer, DM2, CAD, CKD,  who presented on 1/20 with worsening weakness.               Interval History:     Per patient or family/caregivers today:  Met with wife and patient today. Patient feels he is in a good spot and wants to be offered chemotherapy pending biopsy results. Wife is in agreement. They would still like to hear from Austin as well. WE discussed that hospice can always be in the future but doesn't need to " be right now. We talked about outpatient palliative follow up for on going goals of care and support. Discussed with oncology too that will be able to support her through some decision making. We discussed her worries etc. SW and medical team updated.             Review of Systems:     Besides above, an additional system ROS was reviewed and is unremarkable               Physical Exam:   Temp: 98.3  F (36.8  C) Temp src: Oral Temp  Min: 97.3  F (36.3  C)  Max: 98.3  F (36.8  C) BP: (!) 153/78 Pulse: 100   Resp: 16 SpO2: 97 % O2 Device: None (Room air)    Vital Signs with Ranges  Temp:  [97.3  F (36.3  C)-98.3  F (36.8  C)] 98.3  F (36.8  C)  Pulse:  [] 100  Resp:  [14-18] 16  BP: (107-153)/(59-78) 153/78  SpO2:  [97 %-99 %] 97 %  174 lbs 6.4 oz    EXAM:  Constitutional: alert and no distress   Cardiovascular: negative  Respiratory: negative  Psychiatric: mentation appears normal and affect normal/bright  Abdomen: Abdomen soft, non-tender. BS normal. No masses, organomegaly  Pain: no s/s of pain             Current Problem List:   Principal Problem:    Hyponatremia  Active Problems:    Stage 3a chronic kidney disease (H)    Type 2 diabetes mellitus with other specified complication, with long-term current use of insulin (H)    Thrombocytopenia (H24)    Hypomagnesemia    Generalized weakness    Iron deficiency anemia, unspecified iron deficiency anemia type      Allergies   Allergen Reactions    Seasonal Allergies     Beta Adrenergic Blockers             Data Reviewed:       Results for orders placed or performed during the hospital encounter of 01/20/24 (from the past 24 hour(s))   Glucose by meter   Result Value Ref Range    GLUCOSE BY METER POCT 315 (H) 70 - 99 mg/dL   Glucose by meter   Result Value Ref Range    GLUCOSE BY METER POCT 285 (H) 70 - 99 mg/dL   Glucose by meter   Result Value Ref Range    GLUCOSE BY METER POCT 272 (H) 70 - 99 mg/dL   Glucose by meter   Result Value Ref Range    GLUCOSE BY METER  POCT 228 (H) 70 - 99 mg/dL   Magnesium   Result Value Ref Range    Magnesium 1.7 1.7 - 2.3 mg/dL   Glucose by meter   Result Value Ref Range    GLUCOSE BY METER POCT 171 (H) 70 - 99 mg/dL   INR   Result Value Ref Range    INR 1.25 (H) 0.85 - 1.15     *Note: Due to a large number of results and/or encounters for the requested time period, some results have not been displayed. A complete set of results can be found in Results Review.          Medical Decision Making       55 MINUTES SPENT BY ME on the date of service doing chart review, history, exam, documentation & further activities per the note.

## 2024-01-23 NOTE — PLAN OF CARE
PRIMARY DIAGNOSIS: GENERALIZED WEAKNESS    OUTPATIENT/OBSERVATION GOALS TO BE MET BEFORE DISCHARGE  1. Orthostatic performed: No    2. Tolerating PO medications: Yes    3. Return to near baseline physical activity: Yes    4. Cleared for discharge by consultants (if involved): Nom Hem/Onc, Palliative consult    Discharge Planner Nurse   Safe discharge environment identified: No  Barriers to discharge: Yes       Entered by: Janelle Irvin RN 01/23/2024 4:34 AM   Blood pressure 114/59, pulse 81, temperature 98  F (36.7  C), temperature source Oral, resp. rate 16, weight 79.1 kg (174 lb 6.4 oz), SpO2 97%.   Pt is alert and oriented x4, but forgetful. Reports constant 2/10 right abd pain. On Mg replacement protocol. Plan for outpatient liver biopsy. Possible discharge to home tomorrow with wife.   Please review provider order for any additional goals.   Nurse to notify provider when observation goals have been met and patient is ready for discharge.

## 2024-01-23 NOTE — PLAN OF CARE
PRIMARY DIAGNOSIS: GENERALIZED WEAKNESS    OUTPATIENT/OBSERVATION GOALS TO BE MET BEFORE DISCHARGE  1. Orthostatic performed: No    2. Tolerating PO medications: Yes    3. Return to near baseline physical activity: Yes    4. Cleared for discharge by consultants (if involved): No    Discharge Planner Nurse   Safe discharge environment identified: No  Barriers to discharge: Yes       Entered by: Janelle Irvin RN 01/23/2024 1:42 AM     Please review provider order for any additional goals.   Nurse to notify provider when observation goals have been met and patient is ready for discharge.

## 2024-01-23 NOTE — CONSULTS
Interventional Radiology Note  Inpatient - [facility]  1/23/2024    IR consulted for targeted liver biopsy. Imaging reviewed by Dr. Verma and biopsy is technically feasible by ultrasound. INR is ordered- needs to be completed prior to biopsy. Keep NPO today for likely biopsy once labs completed.    Linh Smith PA-C  Interventional Radiology  [facility] IR PA desk phone *[phone]

## 2024-01-23 NOTE — PROGRESS NOTES
Pt here for liver biopsy.  Consent and procedure completed by Dr Verma.  Sedation given.  VSS.  Bandaid applied. 1 mg of versed and 50 mcg versed, 15 minutes sedation time. Report given to bedside RN, samples brought to pathology for processing.

## 2024-01-23 NOTE — PROGRESS NOTES
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      Weakness  Deconditioning  Moderate hypotonic hyponatremia  Symptomatic anemia   Progressively worsening over the last few weeks. Labs in the ED were pretty unremarkable apart from a mild NINA and some electrolyte abnormalities. Also has had a pretty poor appetite during that time.  No other real illness symptoms.  Denies fevers, chills, night sweats, respiratory symptoms.  Does have some loose stools, but this is chronic following colectomy due to colon cancer.  CT of the abdomen and pelvis from 1/17 showed interval development of likely metastatic liver lesions.  - s/p IVF with improvement in creatinine to baseline  - s/p 1 unit of prbc with now stable hgb levels  - Oncology Consulted and recommend Liver Biopsy which will be performed today  - Palliative Care consulted; no longer wanting hospice  - PT consulted and recommend home  - wife does not think she can take him home today; requesting discharge on 1/24/24     New metastatic liver lesions  Adenocarcinoma of transverse colon laparoscopic, open extended right colectomy with ileocolic anastomosis  CLL/SLL, stage 3  SSC of the jaw s/p Moh's  Basal cell carcinoma  MGUS  Extensive cancer history and follows with oncology regularly as outpatient Regarding the colon cancer. Per most recent oncology note, patient declined adjuvant chemotherapy and elected for observation and surveillance of disease.  Regarding CLL/SLL, no current therapies, under surveillance as well.  Regarding SCC and BSC, has had multiple cancerous lesions removed,  follows with dermatology. Oncology Ordered repeat CT CAP which was done 1/17 and unfortunately showed multiple masses of the liver consistent with new liver metastatic disease  - oncology consulted: Dr. Badillo had goals of care discussion, family would like to pursue hospice as of 1/21/24   - palliative care consulted 1/22/24 and family does not want hospice any longer and requested  - Oncology Consulted and recommend Liver Biopsy which will be performed today and planning for outpatient follow up next week.     Chronic steroid use  Currently has a steroid taper ongoing.  Per taper recommendations, currently taking 7.5 mg daily.  Will continue for now. Next dose adjustment is decreased to 5 mg daily on 1/26.  - Continue prednisone 7.5mg daily through 1/25     Diabetes mellitus type 2  - continue pta lantus 10 units daily, dulaglutide and metformin   - sliding scale insulin PRN     Atrial fibrillation, previously on anticoagulation, now in sinus rhythm  Had A-fib with RVR during hospitalization last year.  Underwent DCCV with conversion to SR. Was on anticoagulation following DCCV, but is no longer anticoagulated  - Continue PTA metoprolol succinate     CAD s/p PCI x4 in 2021  HTN  Has had little follow-up with cardiology after this procedure.   - Continuing ASA, statin, metoprolol  - Losartan has been on hold due to NINA; likely resume upon discharge     NINA on chronic kidney disease, resolved  Baseline creatinine around 1.1-1.3.  Creatinine was 1.53 on admission due to poor oral intake recently.  Creatinine has improved to 1.22 after IVF.     Hyponatremia  Hypomagnesemia, resolved  Hypocalcemia, stable  Likely hypovolemic hyponatremia given poor p.o. intake reported by patient.  Other electrolytes likely low due to the same issue.  Will replace per RN driven protocols.  Will monitor sodium on daily labs.     Leukocytosis - resolved   Acute on Chronic Anemia of chronic disease  Chronic thrombocytopenia   Previously ALLISON  vs ACD s/p Venofer x 3 in October 2023 with minimal response.  Hemoglobin 6.6 on 1/21/2024 s/p 1 unit of blood inpatient.   - no signs of active bleeding  - hgb remains stable at 7.6  - follow up monitoring with Oncology      Hyperlipidemia  - continue pta rosuvastatin     Abdominal aortic aneurysm  Measured up to 7.6 cm on CT.  Underwent EVAR in 2017.  Complicated by dissection of SMA, underwent embolization with IR. CT on 1/17 shows stable aneursym sac at 7.5 cm.  - Continue outpatient follow-up with vascular surgery     CODE: Full  DVT: scd  Diet/fluids: regular  Disposition:  home with family likely on 1/24/24      Vanessa Shani MS PA-C  Hospitalist Physician Assistant  [facility]      Subjective & Interval Hx:    Patient reports he is tolerating po.  He denies any nausea or emesis overnight. Has a RUQ pain that is worse with twisting motion and not there at rest.  Feels he is ready to discharge home.  Later this afternoon, Palliative Care informed me  patient's wife states she is not able to take him home today.    Last 24 hr care team notes reviewed.   ROS:  4 point ROS including Respiratory, CV, GI and , other than that noted in the HPI, is negative.    Physical Exam:    Blood pressure 120/63, pulse 65, temperature 98.1  F (36.7  C), temperature source Oral, resp. rate 16, weight 79.1 kg (174 lb 6.4 oz), SpO2 100%.  General: Alert, interactive, NAD  HEENT: AT/NC  Resp: clear to auscultation bilaterally, no crackles or wheezes  Cardiac: regular rate and rhythm, no murmur  Abdomen: Soft, nontender, nondistended. +BS.   Extremities: No LE edema  Skin: Warm and dry  Neuro: Alert & oriented x 3, moves all extremities equally    Labs & Images:  Reviewed in Epic   Medications:    Current Facility-Administered Medications   Medication    acetaminophen (TYLENOL) tablet 650 mg    aspirin EC tablet 81 mg    glucose gel 15-30 g    Or    dextrose 50 % injection 25-50 mL    Or    glucagon injection 1 mg     fentaNYL (PF) (SUBLIMAZE) injection 25-50 mcg    flumazenil (ROMAZICON) injection 0.2 mg    insulin aspart (NovoLOG) injection (RAPID ACTING)    insulin aspart (NovoLOG) injection (RAPID ACTING)    insulin glargine (LANTUS PEN) injection 10 Units    metoprolol succinate ER (TOPROL XL) 24 hr tablet 50 mg    midazolam (VERSED) injection 0.5-2 mg    naloxone (NARCAN) injection 0.2 mg    Or    naloxone (NARCAN) injection 0.4 mg    Or    naloxone (NARCAN) injection 0.2 mg    Or    naloxone (NARCAN) injection 0.4 mg    ondansetron (ZOFRAN ODT) ODT tab 4 mg    Or    ondansetron (ZOFRAN) injection 4 mg    pantoprazole (PROTONIX) EC tablet 40 mg    predniSONE (DELTASONE) tablet 7.5 mg    rosuvastatin (CRESTOR) tablet 20 mg    senna-docusate (SENOKOT-S/PERICOLACE) 8.6-50 MG per tablet 1 tablet    Or    senna-docusate (SENOKOT-S/PERICOLACE) 8.6-50 MG per tablet 2 tablet    sodium chloride 0.9% 1000 mL TABLE SOLN    sodium chloride 0.9% BOLUS 1-250 mL        Walk in

## 2024-01-23 NOTE — PROGRESS NOTES
01/22/24 1213   Appointment Info   Signing Clinician's Name / Credentials (PT) Maryam Mckeon DPT   Quick Adds   Quick Adds Certification   Living Environment   People in Home spouse   Current Living Arrangements house  (split level)   Home Accessibility stairs to enter home;stairs within home   Number of Stairs, Main Entrance 1   Stair Railings, Main Entrance railings safe and in good condition   Number of Stairs, Within Home, Primary seven   Stair Railings, Within Home, Primary railings safe and in good condition   Transportation Anticipated family or friend will provide   Living Environment Comments Pt lives in split level home with wife, 1STE then 7 stairs to reach bedroom/living area   Self-Care   Usual Activity Tolerance good   Current Activity Tolerance moderate   Equipment Currently Used at Home cane, straight   Fall history within last six months yes   Number of times patient has fallen within last six months 1   Activity/Exercise/Self-Care Comment Pt Bhumika with cane at baseline.   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   Existing Precautions/Restrictions fall   Cognition   Affect/Mental Status (Cognition) WNL   Orientation Status (Cognition) oriented x 4   Follows Commands (Cognition) WNL   Pain Assessment   Patient Currently in Pain Yes, see Vital Sign flowsheet  (did not provide value, noting discomfort in R abdomen)   Integumentary/Edema   Integumentary/Edema Comments normal aging changes   Posture    Posture  Forward head position;Protracted shoulders   Range of Motion (ROM)   Range of Motion ROM is WFL   Strength (Manual Muscle Testing)   Strength (Manual Muscle Testing) strength is WFL   Bed Mobility   Comment, (Bed Mobility) IND supine>sit   Transfers   Comment, (Transfers) SBA sit<>stand with SPC   Gait/Stairs (Locomotion)   Distance in Feet (Gait) 10' for eval   Comment, (Gait/Stairs) CGA with SPC ambulation   Balance   Balance Comments some unsteadiness noted in standing with SPC, no LOB throughout   Clinical Impression   Criteria for Skilled Therapeutic Intervention Yes, treatment indicated   PT Diagnosis (PT) impaired mobility   Influenced by the following impairments decreased activity tolerance, impaired balance   Functional limitations due to impairments impaired bed mobility, transfers, ambulation, stairs   Clinical Presentation (PT Evaluation Complexity) stable   Clinical Presentation Rationale clinical judgement, chart review   Clinical Decision Making (Complexity) low complexity   Planned Therapy Interventions (PT) balance training;bed mobility training;gait training;home exercise program;neuromuscular re-education;patient/family education;strengthening;stair training;transfer training;home program guidelines;risk factor education;progressive activity/exercise   Risk & Benefits of therapy have been explained evaluation/treatment results reviewed;care plan/treatment goals reviewed;risks/benefits reviewed;current/potential barriers reviewed;participants voiced agreement with care plan;participants included;patient   PT Total Evaluation Time   PT Eval, Low Complexity Minutes (98631) 5   Therapy Certification   Start of care date 01/22/24   Certification date from 01/22/24   Certification date to 01/25/24   Medical Diagnosis hyponatremia   Physical Therapy Goals   PT Frequency Daily   PT Predicted Duration/Target Date for Goal Attainment 01/23/24   PT Goals Bed Mobility;Transfers;Gait;Stairs   PT: Bed Mobility  "Independent;Supine to/from sit   PT: Transfers Modified independent;Sit to/from stand;Assistive device   PT: Gait Supervision/stand-by assist;Straight cane;150 feet   PT: Stairs Supervision/stand-by assist;7 stairs;Rail on right   Interventions   Interventions Quick Adds Therapeutic Activity;Therapeutic Procedure   Therapeutic Procedure/Exercise   Ther. Procedure: strength, endurance, ROM, flexibillity Minutes (00041) 13   Symptoms Noted During/After Treatment fatigue   Treatment Detail/Skilled Intervention Pt cued for seated exercises including LAQs and seated marches, x10 each bilaterally. Cueing for slow and controlled movement, encouraged to complete independently for strength benefits. Cued pt to complete repeated STSx10 with SBA, limited by fatigue in LEs. Extended discussion regarding exercises upon returning home. Pt noting enjoyment exercising at the \"[facility],\" provided edu on safe equipment to use based on current mobility level i.e. stationary bike, LE and UE stationary machines.   Therapeutic Activity   Therapeutic Activities: dynamic activities to improve functional performance Minutes (59324) 10   Symptoms Noted During/After Treatment Fatigue   Treatment Detail/Skilled Intervention Pt supine upon arrival, agreeable. After eval, pt ambulated another 250' with SPC and CGA progressing to SBA with a goal of improving activity tolerance. No LOB throughout, but some minor unsteadiness noted, wider AVA. Encouraged pt to ambulate with nursing staff for improved activity tolerance, agreeable. Time spent for chair set up. Left pt in bedside recliner, alarm on and needs in reach.   PT Discharge Planning   PT Plan trial stairs, progress independence with ambulation, LE therex   PT Discharge Recommendation (DC Rec) home with assist   PT Rationale for DC Rec Pt is near baseline level of mobility. Anticipate with IP PT that pt will progress to return home with assist from wife as needed.   PT Brief overview of current " status SBA with cane   Total Session Time   Timed Code Treatment Minutes 23   Total Session Time (sum of timed and untimed services) 28     [facility]  OUTPATIENT PHYSICAL THERAPY EVALUATION  PLAN OF TREATMENT FOR OUTPATIENT REHABILITATION  (COMPLETE FOR INITIAL CLAIMS ONLY)  Patient's Last Name, First Name, M.I.  YOB: 1940  Austin Garza                        Provider's Name  [facility] Medical Record No.  [id number]                             Onset Date:  01/20/24   Start of Care Date:  01/22/24   Type:     _X_PT   ___OT   ___SLP Medical Diagnosis:  hyponatremia              PT Diagnosis:  impaired mobility Visits from SOC:  1     See note for plan of treatment, functional goals and certification details    I CERTIFY THE NEED FOR THESE SERVICES FURNISHED UNDER        THIS PLAN OF TREATMENT AND WHILE UNDER MY CARE     (Physician co-signature of this document indicates review and certification of the therapy plan).

## 2024-01-23 NOTE — PROGRESS NOTES
Hematology-Oncology Hospital Follow-up Note  [facility]     Today's Date: 01/23/24  Date of Admission:  1/20/2024  Reason for Consult: Recurrent metastatic colon cancer     ASSESSMENT/ PLAN :  Austin Garza is a 83 year old male with    # Recurrent/metastatic colon cancer  - S/p  right colectomy with ileocolic anastomosis in September 2023, margins negative, 0/26 lymph nodes.    - Adjuvant chemotherapy was discussed, however due to comorbidities and risk of treatment, observation was chosen.    - Now with recurrent and metastatic disease to the liver.    - Initial discussion for hospice, now family wanting to discuss treatment options   - IR referral placed for liver lesion biopsy with CARIS testing, spoke with IR team and they are able to do today even with recent aspirin use  - We will arrange outpatient visit with Dr. Badillo   - Outpatient team did send referral to [facility] per family request   - Wife asking for information on hospice, messaged palliative for this     # CLL  - Confirmed by bone biopsy, ibrutinib has been on hold since August 2021 due to atrial fibrillation and Eliquis  - Continue CBC monitoring inpatient, stable   - He may need to start ibrutinib in the future, but no changes while inpatient.     # Anemia  - Previously ALLISON vs ACD s/p Venofer x 3 in October 2023 with minimal response.   - Hemoglobin 6.6 on 1/21/2024 s/p 1 unit of blood inpatient.  MCV has been normal.  - Added on STFR, in process  - Transfuse for hgb <7. CBC stable today.      # FEN  - Electrolyte imbalances and NINA.  Overall improved.  - Hospital team managing    # DM  - Defer to primary team for management     Discussed with Dr. Badillo. OK to discharge from oncology standpoint pending assessment by primary team and completion of liver biopsy. We will see him next week as scheduled.     INTERIM HISTORY:  Benji is feeling well and eager to get home. Wife notes his blood sugars have been elevated. Also has questions  "about his DM meds and when the biopsy will happen which we discussed. Reviewed plan to see Dr. Badillo in clinic next week.      MEDICATIONS:  Reviewed     PHYSICAL EXAM:  Vital signs:  Temp: 98.3  F (36.8  C) Temp src: Oral BP: (!) 153/78 Pulse: 100   Resp: 16 SpO2: 97 % O2 Device: None (Room air)     Weight: 79.1 kg (174 lb 6.4 oz)  Estimated body mass index is 25.75 kg/m  as calculated from the following:    Height as of 12/27/23: 1.753 m (5' 9\").    Weight as of this encounter: 79.1 kg (174 lb 6.4 oz).      GENERAL/CONSTITUTIONAL: No acute distress.  RESPIRATORY: Non-labored breathing     LABS:  Recent Labs   Lab Test 01/23/24  0826 01/22/24  0814 01/22/24  0730   NA  --   --  132*   POTASSIUM  --   --  4.1   CHLORIDE  --   --  102   CO2  --   --  19*   ANIONGAP  --   --  11   BUN  --   --  23.4*   CR  --   --  1.22*   *   < > 160*   MAGDALENO  --   --  8.0*    < > = values in this interval not displayed.     Recent Labs   Lab Test 01/22/24  0730 01/21/24  0941 01/20/24  1837 01/17/24  0947   WBC 5.4 6.7   < > 7.1   HGB 7.6* 7.1*   < > 8.8*   PLT 68* 64*   < > 99*   MCV 90 91   < > 90   NEUTROPHIL  --  74  --  59    < > = values in this interval not displayed.     Recent Labs   Lab Test 01/21/24  0756 01/17/24  0947 10/20/23  1452   BILITOTAL 0.9 0.6 0.7   ALKPHOS 209* 250* 73   ALT 90* 110* 32   * 89* 17   ALBUMIN 2.8* 3.5 3.6   LDH  --  253* 179       Dutch Giang PA-C  Department of Hematology and Oncology  [facility]   Pager [phone]    "

## 2024-01-23 NOTE — IR NOTE
[facility]  POST PROCEDURE NOTE        Procedure: US guided left hepatic lobe mass biopsy.  8 samples obtained with 18 g needle and gelfoam slurry applied in tract upon completion.    Physician: Claribel    Type of Sedation: Sedation    Estimated Blood Loss: 10 ml    Specimen/Tissues Removed: Left hepatic lobe masses    Findings: Please see above.    Plan: Routine post biopsy care.      Alberto Verma MD   1/23/2024, 3:47 PM

## 2024-01-24 NOTE — DISCHARGE SUMMARY
[facility] Discharge Summary          Austin Garza MRN# [id number]   Age: 83 year old YOB: 1940     Date of Admission:  1/20/2024  Date of Discharge::  1/24/2024 11:09 AM  Admitting Physician:  No admitting provider for patient encounter.  Discharge Physician:  Vanessa Mcleod PA-C  Primary Physician: Luma Kauffman     Primary Discharge Diagnoses:   Weakness  Deconditioning  Moderate hypotonic hyponatremia  Symptomatic anemia   New metastatic liver lesions      Hospital Course:   For detail history, please refer to H & P from 1/20/2024. In brief, this is a 83 year old male with past medical history of hypertension, GERD, CLL, colon cancer, basal cell and squamous cell skin cancers, DM2, CAD, CKD who presented to ED with several weeks of progressively worsening weakness.      Weakness  Deconditioning  Moderate hypotonic hyponatremia  Symptomatic anemia   Progressively worsening over the last few weeks. Labs in the ED were pretty unremarkable apart from a mild NINA and some electrolyte abnormalities. Also has had a pretty poor appetite during that time.  No other real illness symptoms.  Denies fevers, chills, night sweats, respiratory symptoms.  Does have some loose stools, but this is chronic following colectomy due to colon cancer.  CT of the abdomen and pelvis from 1/17 showed interval development of likely metastatic liver lesions.  - s/p IVF with improvement in creatinine to baseline  - s/p 1 unit of prbc with now stable hgb levels  - Oncology Consulted and recommend Liver Biopsy which was performed on 1/23/24.  Patient instructed to hold his ASA until 1/26/24.  Oncology will follow up with results.  - Palliative Care consulted; no longer wanting hospice  - PT consulted and recommend home with home cares  - SW consulted      New metastatic liver lesions  Adenocarcinoma of transverse colon laparoscopic, open extended right colectomy with ileocolic anastomosis  CLL/SLL,  stage 3  SSC of the jaw s/p Moh's  Basal cell carcinoma  MGUS  Extensive cancer history and follows with oncology regularly as outpatient Regarding the colon cancer. Per most recent oncology note, patient declined adjuvant chemotherapy and elected for observation and surveillance of disease.  Regarding CLL/SLL, no current therapies, under surveillance as well.  Regarding SCC and BSC, has had multiple cancerous lesions removed, follows with dermatology. Oncology Ordered repeat CT CAP which was done 1/17 and unfortunately showed multiple masses of the liver consistent with new liver metastatic disease  - oncology consulted: Dr. Badillo had goals of care discussion, family would like to pursue hospice as of 1/21/24   - palliative care consulted 1/22/24 and family does not want hospice any longer and requested  - Oncology Consulted and recommend Liver Biopsy which was performed on 1/23/24 and planning for outpatient follow up next week.     Chronic steroid use  Currently has a steroid taper ongoing; continued.     Diabetes mellitus type 2  - continued pta lantus 10 units daily, dulaglutide and metformin      Atrial fibrillation, previously on anticoagulation, now in sinus rhythm  Had A-fib with RVR during hospitalization last year.  Underwent DCCV with conversion to SR. Was on anticoagulation following DCCV, but is no longer anticoagulated  - Continued PTA metoprolol succinate     CAD s/p PCI x4 in 2021  HTN  Has had little follow-up with cardiology after this procedure.   - Continuined ASA (held until 1/26/24 after liver biopsy), statin, metoprolol  - Losartan was held due to AK and resumed upon discharge     NINA on chronic kidney disease, resolved  Baseline creatinine around 1.1-1.3.  Creatinine was 1.53 on admission due to poor oral intake recently.  Creatinine has improved to 1.22 after IVF.     Hyponatremia  Hypomagnesemia, resolved  Hypocalcemia, stable  Likely hypovolemic hyponatremia given poor p.o. intake reported  by patient.  Other electrolytes likely low due to the same issue.  Replaced per protocol. Follow up labs next week recommended with PCP or Oncology     Leukocytosis - resolved   Acute on Chronic Anemia of chronic disease  Chronic thrombocytopenia   Previously ALLISON vs ACD s/p Venofer x 3 in October 2023 with minimal response.  Hemoglobin 6.6 on 1/21/2024 s/p 1 unit of blood inpatient.   - no signs of active bleeding  - hgb remained stable at 7.6  - follow up monitoring with Oncology      Hyperlipidemia  - continued pta rosuvastatin     Abdominal aortic aneurysm  Measured up to 7.6 cm on CT.  Underwent EVAR in 2017.  Complicated by dissection of SMA, underwent embolization with IR. CT on 1/17 shows stable aneursym sac at 7.5 cm.  - Continue outpatient follow-up with vascular surgery    Procedures/Imaging:     Results for orders placed or performed during the hospital encounter of 01/20/24   XR Chest 2 Views    Narrative    EXAM: XR CHEST 2 VIEWS  LOCATION: [facility]  DATE: 1/20/2024    INDICATION: generalized weakness  COMPARISON: 01/17/2024.     Impression    IMPRESSION: The lungs are clear. There is no pleural effusion or pneumothorax. The cardiomediastinal silhouette is normal. The limited visualized portions of the upper abdomen are grossly normal.     US Biopsy Liver    Narrative    US BIOPSY LIVER 1/23/2024 3:04 PM    HISTORY: 83-year-old patient with multiple liver masses, request made  for biopsy.    TECHNIQUE: Patient was brought to the ultrasound department and  informed consent obtained. Patient was placed in a supine position.  Skin overlying the epigastric area was prepped and draped in standard  sterile fashion. 1% lidocaine was used for local anesthetic. With  continuous ultrasound guidance, a 17-gauge guide needle was advanced  into the left hepatic lobe mass, through which multiple 18-gauge core  biopsy samples were obtained, numbering at least 8. Gelfoam slurry  applied in the  tract upon completion. Patient tolerated the procedure  well without complication.    Sedation: A moderate level of sedation was achieved with 1 mg IV  Versed and 50 mcg IV fentanyl.  Sedation time: 15 minutes  Please note the above medications were administered by the radiology  nursing staff under my direct supervision. Patient's vital signs were  monitored and remained stable throughout the procedure.    FINDINGS: Please note that ultrasound images were obtained during  needle placement and Gelfoam slurry application. No apparent  complication.      Impression    IMPRESSION: Successful liver biopsy of mass in the left hepatic lobe.  Sample sent to the lab for evaluation.    YO BURNHAM MD         SYSTEM ID:  [id number]     *Note: Due to a large number of results and/or encounters for the requested time period, some results have not been displayed. A complete set of results can be found in Results Review.     Allergies:     Allergies   Allergen Reactions    Seasonal Allergies     Beta Adrenergic Blockers         Subjective:   Patient reports he is tolerating po.  He denies any nausea or emesis overnight. Has a slight pain around the biopsy site.  Feels ready to discharge home.    Physical Exam:   Blood pressure 124/61, pulse 82, temperature 98.5  F (36.9  C), temperature source Oral, resp. rate 18, weight 79.1 kg (174 lb 6.4 oz), SpO2 98%.  General: Alert, interactive, NAD  HEENT: AT/NC  Resp: clear to auscultation bilaterally, no crackles or wheezes  Cardiac: regular rate and rhythm, no murmur  Abdomen: Soft, nontender, nondistended. +BS.  Bandage from biopsy in place.  No swelling or ecchymosis noted.   Extremities: No LE edema  Skin: Warm and dry  Neuro: Alert & oriented x 3, moves all extremities equally   Discharge Medicatios:        Discharge Medication List as of 1/24/2024 10:41 AM        CONTINUE these medications which have CHANGED    Details   aspirin 81 MG EC tablet Take 1 tablet (81 mg) by mouth  daily Hold after your biopsy.  You may resume on 1/26/24, Historical           CONTINUE these medications which have NOT CHANGED    Details   Alcohol Swabs (ALCOHOL PREP) 70 % PADS Historical      blood glucose (NO BRAND SPECIFIED) lancets standard Use to test blood sugar 3 times daily or as directed.Disp-100 each, [id number]      blood glucose (NO BRAND SPECIFIED) test strip Contour Next Test Strips-Use to test blood sugar 3 times daily or as directed., Disp-300 strip, R-1, E-Prescribe      Continuous Blood Gluc Sensor (DEXCOM G7 SENSOR) MISC 1 each every 10 days Change sensor every 10 days, Disp-3 each, R-5, E-Prescribe      Dulaglutide 4.5 MG/0.5ML SOPN Inject 4.5 mg Subcutaneous once a week, Disp-6 mL, R-4, E-Prescribe      HUMALOG KWIKPEN 100 UNIT/ML soln Sliding Scale insulin dosing:  For Blood glucose 141-180 mg/dL administer 2 units, 181-220 mg/dL administer 4 units, 221-260 mg/dL administer 6 units, 261-300 mg/dL administer 8 units, 301-350 mg/dL administer 10 units, 350-400 mg/dL administer 12 units,  >400 mg/dL administer 14 units, FUENTES, Historical      insulin glargine (LANTUS PEN) 100 UNIT/ML pen Inject 10 Units Subcutaneous every morning (before breakfast), Disp-15 mL, R-0, E-PrescribeIf Lantus is not covered by insurance, may substitute Basaglar or Semglee or other insulin glargine product per insurance preference at same dose and frequency.         insulin pen needle (32G X 4 MM) 32G X 4 MM miscellaneous Use 4 pen needles daily or as directed.Disp-360 each, [id number]      leflunomide (ARAVA) 10 MG tablet Take 10 mg by mouth daily NOT STARTED, Historical      losartan (COZAAR) 25 MG tablet Take 1 tablet (25 mg) by mouth daily, Disp-90 tablet, R-3, E-Prescribe      metFORMIN (GLUCOPHAGE XR) 500 MG 24 hr tablet Take 2 tablets (1,000 mg) by mouth daily (with dinner), Disp-180 tablet, R-4, E-Prescribe      metoprolol succinate ER (TOPROL XL) 100 MG 24 hr tablet Take 50 mg by mouth daily,  "Historical      Microlet Lancets MISC USE TO TEST BLOOD SUGAR 3 TIMES DAILY OR AS DIRECTED., Historical      nitroGLYcerin (NITROSTAT) 0.4 MG sublingual tablet One tablet under the tongue every 5 minutes if needed for chest pain. May repeat every 5 minutes for a maximum of 3 doses in 15 minutes\", Disp-25 tablet, R-3, E-Prescribe      pantoprazole (PROTONIX) 40 MG EC tablet Take 1 tablet (40 mg) by mouth daily, Disp-90 tablet, R-4, E-Prescribe      predniSONE (DELTASONE) 2.5 MG tablet Take by mouth daily 3 tablets daily x 2 weeks (1/12-1/25), 2 tablets daily x 2 weeks (1/26-2/9), 1 tablets daily x 2 weeks (2/10-2/24)  Then stop, Historical      rosuvastatin (CRESTOR) 20 MG tablet Take 1 tablet (20 mg) by mouth daily, Disp-90 tablet, R-4, E-Prescribe      thiamine (B-1) 100 MG tablet Take 1 tablet (100 mg) by mouth daily, Disp-30 tablet, R-0, E-Prescribe             Instructions Given to Patient as Discharge:     Discharge Procedure Orders   Comprehensive metabolic panel (BMP + Alb, Alk Phos, ALT, AST, Total. Bili, TP)   Standing Status: Future Standing Exp. Date: 01/22/25     Medication Therapy Management Referral   Referral Priority: Routine Referral Type: Med Therapy Management   Requested Specialty: Pharmacist   Number of Visits Requested: 1     Adult Palliative Care  Referral   Standing Status: Future   Referral Priority: Routine: Next available opening Referral Type: Consultation   Requested Specialty: Hospice And Palliative Care   Number of Visits Requested: 1     Home Care Referral   Referral Priority: Routine: Next available opening Referral Type: Home Health Therapies & Aides   Number of Visits Requested: 1     Primary Care - Care Coordination Referral   Standing Status: Future   Referral Priority: Routine: Next available opening Referral Type: Care Coordination   Number of Visits Requested: 1     Reason for your hospital stay   Order Comments: You were hospitalized due to Weakness, Deconditioning, " Moderate hyponatremia, Symptomatic anemia.  Your labs and symptoms improved with IVF and your received one unit of blood.  You CT scan showed progression of your cancer and you underwent a liver biopsy.  Palliative Care and Oncology were consulted and you have decided to continue full treatment options as an outpatient with Oncology.     Activity   Order Comments: Your activity upon discharge: activity as tolerated     Order Specific Question Answer Comments   Is discharge order? Yes      Follow-up and recommended labs and tests    Order Comments: Follow up with PCP in one week and Oncology in clinic as instructed.  Liver biopsy results will be followed up by your Oncologist.  Hold your aspirin for 48 hours after your biopsy. 1/26/24     Diet   Order Comments: Follow this diet upon discharge: regular     Order Specific Question Answer Comments   Is discharge order? Yes      CBC with platelets and differential   Standing Status: Future Standing Exp. Date: 01/22/25       Pending Tests at Discharge:   Liver biosy    Discharge Disposition:     Discharged to home     Vanessa Mcleod MS, PA-C  Hospitalist Service  Pager [phone]    >30 minutes was spent in discharge planning, care coordination, physical examination and medication reconciliation on the date of discharge, 1/24/2024

## 2024-01-24 NOTE — PLAN OF CARE
PRIMARY DIAGNOSIS: Weakness  OUTPATIENT/OBSERVATION GOALS TO BE MET BEFORE DISCHARGE:  ADLs back to baseline: Yes    Activity and level of assistance: Up with standby assistance.    Pain status: mild RUQ abdominal pain, declined pain medication.    Return to near baseline physical activity: Yes     Discharge Planner Nurse   Safe discharge environment identified: Yes  Barriers to discharge: No       Entered by: Parvez Santos RN 01/24/2024 12:29 AM    Vitals are Temp: 97.7  F (36.5  C) Temp src: Oral BP: 115/59 Pulse: 69   Resp: 16 SpO2: 99 %.  Patient is Alert and Oriented x4 but forgetful. Ambulating to restroom with SBA and cane. Regular diet. Intermittent mild RUQ abdominal pain, declining pain medication. PIV SL. Denies dizziness and nausea. SIMMONS, chronic. Bandaide to biopsy site CDI. Plan for discharge tomorrow with outpatient oncology follow up.  RN referral also sent for informational hospice meeting. Wife to transport on discharge.        Please review provider order for any additional goals.   Nurse to notify provider when observation goals have been met and patient is ready for discharge.Goal Outcome Evaluation:      Plan of Care Reviewed With: patient    Overall Patient Progress: improvingOverall Patient Progress: improving

## 2024-01-24 NOTE — PLAN OF CARE
Patient's After Visit Summary was reviewed with patient and/or spouse.   Patient verbalized understanding of After Visit Summary, recommended follow up and was given an opportunity to ask questions.   Discharge medications sent home with patient/family: YES, No, Not applicable   Discharged with spouse

## 2024-01-24 NOTE — PLAN OF CARE
PRIMARY DIAGNOSIS: Weakness  OUTPATIENT/OBSERVATION GOALS TO BE MET BEFORE DISCHARGE:  ADLs back to baseline: Yes    Activity and level of assistance: Up with standby assistance.    Pain status: mild RUQ abdominal pain, declined pain medication.    Return to near baseline physical activity: Yes     Discharge Planner Nurse   Safe discharge environment identified: Yes  Barriers to discharge: No       Entered by: Parvez Santos RN 01/23/2024 9:10 PM    Vitals are Temp: 98.5  F (36.9  C) Temp src: Oral BP: 121/66 Pulse: 74   Resp: 16 SpO2: 98 %.  Patient is Alert and Oriented x4 but forgetful. Ambulating to restroom with SBA and cane. Regular diet. Intermittent mild RUQ abdominal pain, declining pain medication. PIV SL. Denies dizziness and nausea. SIMMONS, chronic. Bandaide to biopsy site CDI. Follow up with oncology outpatient. Outpatient hospice referral also sent. Plan for discharge tomorrow. Wife to transport.       Please review provider order for any additional goals.   Nurse to notify provider when observation goals have been met and patient is ready for discharge.Goal Outcome Evaluation:      Plan of Care Reviewed With: patient    Overall Patient Progress: improvingOverall Patient Progress: improving

## 2024-01-24 NOTE — PLAN OF CARE
Physical Therapy Discharge Summary    Reason for therapy discharge:    All goals and outcomes met, no further needs identified.    Progress towards therapy goal(s). See goals on Care Plan in [facility] electronic health record for goal details.  Goals met    Therapy recommendation(s):    Continued therapy is recommended.  Rationale/Recommendations:  Pt is near baseline level of mobility. Anticipate with IP PT that pt will progress to return home with assist from wife as needed.

## 2024-01-24 NOTE — PLAN OF CARE
/61 (BP Location: Left arm)   Pulse 82   Temp 98.5  F (36.9  C) (Oral)   Resp 18   Wt 79.1 kg (174 lb 6.4 oz)   SpO2 98%   BMI 25.75 kg/m     PRIMARY DIAGNOSIS: HYPONATREMIA OUTPATIENT/OBSERVATION GOALS TO BE MET BEFORE DISCHARGE:  ADLs back to baseline: Yes    Activity and level of assistance: Up with standby assistance.    Pain status: Improved-controlled with oral pain medications.    Return to near baseline physical activity: Yes     Discharge Planner Nurse   Safe discharge environment identified: Yes  Barriers to discharge: No       Entered by: Jordan Payne RN 01/24/2024      Please review provider order for any additional goals.   Nurse to notify provider when observation goals have been met and patient is ready for discharge.

## 2024-01-24 NOTE — PLAN OF CARE
PRIMARY DIAGNOSIS: GENERALIZED WEAKNESS   OUTPATIENT/OBSERVATION GOALS TO BE MET BEFORE DISCHARGE:    ADLs back to baseline: Yes    Activity and level of assistance: Up with standby assistance w/ gait belt and cane    Pain status: Improved with use of alternative comfort measures i.e.: R-sided abdominal pain, declines interventions when offered.      Return to near baseline physical activity: Yes     Discharge Planner Nurse   Safe discharge environment identified: Yes  Barriers to discharge: Yes       Entered by: Jazmine Tello RN 01/23/2024      A&Ox4. Up w/ SBA w/ gait belt and cane. Continues blood glucose checks with sliding scale insulin. BS active x4, NPO, passing flatus. Last BM 1/21. Voiding in adequate amt's. PT, Hem/Onc, Spiritual Health consulted, see notes. Plan for liver biopsy today. Aspirin held this AM. Patient states he has been NPO since 10 PM on 1/22. SL.       Please review provider order for any additional goals.   Nurse to notify provider when observation goals have been met and patient is ready for discharge.

## 2024-01-24 NOTE — PLAN OF CARE
PRIMARY DIAGNOSIS: GENERALIZED WEAKNESS   OUTPATIENT/OBSERVATION GOALS TO BE MET BEFORE DISCHARGE:    ADLs back to baseline: Yes    Activity and level of assistance: Up with standby assistance w/ gait belt and cane    Pain status: Improved with use of alternative comfort measures i.e.: R-sided abdominal pain, declines interventions when offered.      Return to near baseline physical activity: Yes     Discharge Planner Nurse   Safe discharge environment identified: Yes  Barriers to discharge: Yes       Entered by: Jazmnie Tello RN 01/23/2024      A&Ox4. Up w/ SBA w/ gait belt and cane. Continues blood glucose checks with sliding scale insulin. BS active x4, NPO, passing flatus. Last BM 1/21. Voiding in adequate amt's. PT, Hem/Onc, Spiritual Health consulted, see notes. Plan for liver biopsy today. Aspirin held this AM. Patient states he has been NPO since 10 PM on 1/22. SL.       Please review provider order for any additional goals.   Nurse to notify provider when observation goals have been met and patient is ready for discharge.

## 2024-01-24 NOTE — PLAN OF CARE
PRIMARY DIAGNOSIS: Weakness  OUTPATIENT/OBSERVATION GOALS TO BE MET BEFORE DISCHARGE:  ADLs back to baseline: Yes    Activity and level of assistance: Up with standby assistance.    Pain status: Intermittent RUQ pain    Return to near baseline physical activity: Yes     Discharge Planner Nurse   Safe discharge environment identified: Yes  Barriers to discharge: No       Entered by: Parvez Santos RN 01/24/2024 4:02 AM    Vitals are Temp: 97.8  F (36.6  C) Temp src: Oral BP: 114/59 Pulse: 74   Resp: 16 SpO2: 98 %.  Patient is Alert and Oriented x4 but forgetful. Ambulating to restroom with SBA and cane. Regular diet. Intermittent mild RUQ abdominal pain, declining pain medication. PIV SL. Denies dizziness and nausea. SIMMONS, chronic. Bandaide to biopsy site CDI. Plan for discharge today with outpatient oncology follow up. HC RN referral sent by  for informational hospice meeting. Wife to transport on discharge.        Please review provider order for any additional goals.   Nurse to notify provider when observation goals have been met and patient is ready for discharge.Goal Outcome Evaluation:      Plan of Care Reviewed With: patient    Overall Patient Progress: improvingOverall Patient Progress: improving

## 2024-01-24 NOTE — PROGRESS NOTES
Isaac message received from Benji and his wife Marcie requesting to get a second opinion at the [facility].         Writer faxed referral to the [facility] along with Dr. Badillo's last office visit note and face sheet.     Letty Arevalo RN on 1/24/2024 at 11:59 AM

## 2024-01-25 NOTE — TELEPHONE ENCOUNTER
Fern from "[employer] called with patient status.    BS reading on Dexacom 40 today. Patient c/o shakiness. Drank a lot of orange juice and went up to 300.    Fern stated was 268 when she left.     Patient had mistakenly taken metoprolol 100 mg instead of 50 mg today and BP reading was 84/49. Increased to 89/52 when Fern left patient.      This nurse spoke with patient and wife on speaker phone.    BS now 229. Patient states feels ok. Did not do home BP reading .    Patient c/o dizziness that has been going on for months. Dizzy when sitting up from laying position.    Patient has supply of metoprolol  mg  which he is instructed to cut in half.    If advise concerning this, wife requests no call backs until tomorrow as both are exhausted.    Deena Mendoza RN

## 2024-01-25 NOTE — PROGRESS NOTES
[facility]: Post-Discharge Note  SITUATION                                                      Admission:    Admission Date: 01/20/24   Reason for Admission: (P) Weakness  Discharge:   Discharge Date: 01/24/24  Discharge Diagnosis: (P) Weakness, Deconditioning, Moderate hypotonic hyponatremia, Symptomatic anemia, New metastatic liver lesions    BACKGROUND                                                      Per hospital discharge summary and inpatient provider notes.    For detail history, please refer to H & P from 1/20/2024. In brief, this is a 83 year old male with past medical history of hypertension, GERD, CLL, colon cancer, basal cell and squamous cell skin cancers, DM2, CAD, CKD who presented to ED with several weeks of progressively worsening weakness.      Weakness  Deconditioning  Moderate hypotonic hyponatremia  Symptomatic anemia   Progressively worsening over the last few weeks. Labs in the ED were pretty unremarkable apart from a mild NINA and some electrolyte abnormalities. Also has had a pretty poor appetite during that time.  No other real illness symptoms.  Denies fevers, chills, night sweats, respiratory symptoms.  Does have some loose stools, but this is chronic following colectomy due to colon cancer.  CT of the abdomen and pelvis from 1/17 showed interval development of likely metastatic liver lesions.  - s/p IVF with improvement in creatinine to baseline  - s/p 1 unit of prbc with now stable hgb levels  - Oncology Consulted and recommend Liver Biopsy which was performed on 1/23/24.  Patient instructed to hold his ASA until 1/26/24.  Oncology will follow up with results.  - Palliative Care consulted; no longer wanting hospice  - PT consulted and recommend home with home cares  - SW consulted      New metastatic liver lesions  Adenocarcinoma of transverse colon laparoscopic, open extended right colectomy with ileocolic anastomosis  CLL/SLL, stage 3  SSC of the jaw s/p Moh's  Basal cell  carcinoma  MGUS  Extensive cancer history and follows with oncology regularly as outpatient Regarding the colon cancer. Per most recent oncology note, patient declined adjuvant chemotherapy and elected for observation and surveillance of disease.  Regarding CLL/SLL, no current therapies, under surveillance as well.  Regarding SCC and BSC, has had multiple cancerous lesions removed, follows with dermatology. Oncology Ordered repeat CT CAP which was done 1/17 and unfortunately showed multiple masses of the liver consistent with new liver metastatic disease  - oncology consulted: Dr. Badillo had goals of care discussion, family would like to pursue hospice as of 1/21/24   - palliative care consulted 1/22/24 and family does not want hospice any longer and requested  - Oncology Consulted and recommend Liver Biopsy which was performed on 1/23/24 and planning for outpatient follow up next week.     Chronic steroid use  Currently has a steroid taper ongoing; continued.     Diabetes mellitus type 2  - continued pta lantus 10 units daily, dulaglutide and metformin      Atrial fibrillation, previously on anticoagulation, now in sinus rhythm  Had A-fib with RVR during hospitalization last year.  Underwent DCCV with conversion to SR. Was on anticoagulation following DCCV, but is no longer anticoagulated  - Continued PTA metoprolol succinate     CAD s/p PCI x4 in 2021  HTN  Has had little follow-up with cardiology after this procedure.   - Continuined ASA (held until 1/26/24 after liver biopsy), statin, metoprolol  - Losartan was held due to AK and resumed upon discharge     NINA on chronic kidney disease, resolved  Baseline creatinine around 1.1-1.3.  Creatinine was 1.53 on admission due to poor oral intake recently.  Creatinine has improved to 1.22 after IVF.     Hyponatremia  Hypomagnesemia, resolved  Hypocalcemia, stable  Likely hypovolemic hyponatremia given poor p.o. intake reported by patient.  Other electrolytes likely low due  to the same issue.  Replaced per protocol. Follow up labs next week recommended with PCP or Oncology     Leukocytosis - resolved   Acute on Chronic Anemia of chronic disease  Chronic thrombocytopenia   Previously ALLISON vs ACD s/p Venofer x 3 in October 2023 with minimal response.  Hemoglobin 6.6 on 1/21/2024 s/p 1 unit of blood inpatient.   - no signs of active bleeding  - hgb remained stable at 7.6  - follow up monitoring with Oncology      Hyperlipidemia  - continued pta rosuvastatin     Abdominal aortic aneurysm  Measured up to 7.6 cm on CT.  Underwent EVAR in 2017.  Complicated by dissection of SMA, underwent embolization with IR. CT on 1/17 shows stable aneursym sac at 7.5 cm.  - Continue outpatient follow-up with vascular surgery    ASSESSMENT      Discharge Assessment  How are you doing now that you are home?: (P) Benji had a rough night.  How are your symptoms? (Red Flag symptoms escalate to triage hotline per guidelines): (P) Unchanged  Do you feel your condition is stable enough to be safe at home until your provider visit?: (P)  (Benji's wife Marcie is unsure if they feel stable to be home until appointment.)  Does the patient have their discharge instructions? : (P) Yes  Does the patient have questions regarding their discharge instructions? : (P) No  Were you started on any new medications or were there changes to any of your previous medications? : (P) No  Does the patient have all of their medications?: (P) Yes  Do you have questions regarding any of your medications? : (P) No  Do you have all of your needed medical supplies or equipment (DME)?  (i.e. oxygen tank, CPAP, cane, etc.): (P) Yes  Discharge follow-up appointment scheduled within 14 calendar days? : (P) Yes  Discharge Follow Up Appointment Date: (P) 01/30/24  Discharge Follow Up Appointment Scheduled with?: (P) Specialty Care Provider    Writer was unable to complete post hospital assessment with Benji as he was sleeping. Writer spoke with his  wife Marcie. Marcie states that Benji had a very rough night lastnight. He was up every 15 minutes drinking water and could barely make it to the bathroom due to extreme weakness. Marcie wanted to bring Benji to the ER again lastnight, but Benji declined. Marcie reports having [facility] (home health nursing) come to their home today at 1:30 for admission.     PLAN                                                      Outpatient Plan:  Benji has follow up with Dr. Badillo on 01/30/24. Writer discussed the importance of monitoring Benji closely. Writer reviewed the contact information for our triage team that can be reached 24/7. Marcie verbalized understanding.     Future Appointments   Date Time Provider Department Center   1/29/2024 11:15 AM Claudette Maddox OT [facility]   1/30/2024  3:00 PM Miriam Badillo, DO [facility]   1/31/2024  2:15 PM Deysi Campos, PT [facility]   2/5/2024 10:30 AM Claudette Maddox, OT [facility]   2/8/2024 10:30 AM Deysi Campos, PT [facility]   2/12/2024 10:30 AM Claudette Maddox, OT [facility]   2/15/2024 10:30 AM Deysi Campos, PT [facility]   2/19/2024 10:30 AM Claudette Maddox, OT [facility]   2/26/2024 10:30 AM Luma Kauffman MD [facility]   2/27/2024 10:30 AM Claudette Maddox OT [facility]         For any urgent concerns, please contact our 24 hour clinic line:   [place]: [phone]       Letty Arevalo RN

## 2024-01-25 NOTE — PROGRESS NOTES
Clinic Care Coordination Contact  Care Coordination Clinician Chart Review    Situation: Patient chart reviewed by care coordinator.    Background: Clinic Care Coordination Referral received from inpatient care team for transition handoff communication following hospital admission.    Assessment: Upon chart review, patient is not a candidate for Primary Care Clinic Care Coordination enrollment due to reason stated below:  Primary Care Clinic Care Coordination will defer follow-up outreach to Specialty Clinic Care Coordination team who are already closely following patient.    Plan/Recommendations: Clinic Care Coordination Referral/order cancelled. RN/SW CC will perform no further monitoring/outreaches at this time and will remain available as needed. If new needs arise, a new Care Coordination Referral may be placed.    Kerrie Ureña RN Care Coordinator  [facility] [place]  Email: [email or web address]  Phone: [phone]

## 2024-01-25 NOTE — TELEPHONE ENCOUNTER
Fern from [facility] called requesting orders.    Please advise on orders.    Skilled nursing 1 x wk for 1 wk, then 2 x wk for 2 wks, then 1 x wk for 3 wks.    O T eval and tx effective 1/28/24 for 1 x wk for 1 wk    SWS effective 2/1/24. Care giver, community assistance, and cancer groups.    Please call Fern [phone] with response.    Deena Mendoza RN

## 2024-01-25 NOTE — TELEPHONE ENCOUNTER
Ok to give verbal orders.    Luma Kauffman MD  Internal Medicine & Pediatrics  [facility]  She/her

## 2024-01-26 NOTE — TELEPHONE ENCOUNTER
Called Fern back & provided verbal orders.    SHAMIR Jauregui  Patient Advocate Liason (PAL)  [facility]  Ph. [phone] / Fax. [phone]

## 2024-01-26 NOTE — TELEPHONE ENCOUNTER
Called spouse(Marcie) at [phone] to get an update, not available, so LM to call me back. Will await for the call back.     SHAMIR Jauregui  Patient Advocate Liason (PAL)  [facility]  Ph. [phone] / Fax. [phone]

## 2024-01-29 NOTE — PROGRESS NOTES
"Fax received from the [facility] stating that they received Benji's referral. \"We have processed your referral and have forwarded it to Medical Oncology for review.\"     Letty Arevalo RN on 1/29/2024 at 9:54 AM    "

## 2024-01-29 NOTE — TELEPHONE ENCOUNTER
Spouse(Marcie) LM at 8:22 am returning my call, stating that pt is not doing well. Requesting a call back at [phone].     Called pt back to get details, not available, so LM to call me back. Will await for their call back.     Shania RN  Patient Advocate Liason (PAL)  [facility]  Ph. [phone] / Fax. [phone]

## 2024-01-30 PROBLEM — C18.9 ADENOCARCINOMA OF COLON METASTATIC TO LIVER (H): Status: ACTIVE | Noted: 2024-01-01

## 2024-01-30 PROBLEM — C78.7 ADENOCARCINOMA OF COLON METASTATIC TO LIVER (H): Status: ACTIVE | Noted: 2024-01-01

## 2024-01-30 NOTE — TELEPHONE ENCOUNTER
Spouse sent [facility] message with clarification.    SHAMIR Jauregui  Patient Advocate Liason (PAL)  [facility]  Ph. [phone] / Fax. [phone]

## 2024-01-30 NOTE — TELEPHONE ENCOUNTER
Noted. Depending on oncology visit may be worth discontinuing beta blocker as may be contributing somewhat to fatigue.    Luma Kauffman MD  Internal Medicine & Pediatrics  [facility] [place]  She/her

## 2024-01-30 NOTE — LETTER
1/30/2024         RE: Austin Garza  [address] [place] [address]        Dear Colleague,    Thank you for referring your patient, Austin Garza, to the [facility]. Please see a copy of my visit note below.    [facility]    Hematology/Oncology Established Patient Note      Today's Date: 1/30/24    Reason for Follow-up: CLL      HISTORY OF PRESENT ILLNESS: Austin Garza is an 83 year old male with CLL previously on ibrutinib and followed by Dr. Perry.    He has PMHx of DMII, HTN who presented with leukocytosis.  In November 2017, he was found to have elevated WBC of 61.7K, hemoglobin of 10.4, and platelet of 115K.  There were no other CBC results available between 2010 and 2017.  Prior to 2010, he had normal CBC, with normal to mild anemia, and mild thrombocytopenia.   He was asymptomatic.    He underwent bone marrow biopsy on 11/21/17, which was consistent with chronic lymphocytic leukemia/small lymphocytic lymphoma, with 13% ringed sideroblasts identified.  The presence of increased numbers of ringed sideroblasts raises the possibility of an associated myelodysplastic syndrome.  Dysplastic changes are not identified though.  Many cases exhibiting ringed sideroblasts are metabolic origin, without significant risk of progression to acute leukemia, and these typically do not show dysplastic morphology as is the case with this specimen.  15% ringed sideroblasts are required for a diagnosis for RARS, which this specimen does not meet.  Flow cytometry is also consistent with CLL/SLL.  Cytogenetics show two (10%) of the metaphase cells comprise a clone characterized by a deletion within the proximal long arm of a chromosome 13 as the sole karyotypic abnormality.  Three (15%) metaphases had loss of the Y chromosome as the sole abnormality.    CT scan on 11/29/17 shows mildly enlarged left axillary lymph node and periaortic lymph nodes in the  retroperitoneum of the abdomen.  Spleen is also enlarged.    On his staging CT scan for CLL, he was incidentally found to have a large infrarenal abdominal aortic aneurysm, measuring 7.6 cm.  He was seen by Dr. Thomas, and he underwent EVAR on 12/4/17.     He started ibrutinib on 5/4/20.  It was held 5/28/20-6/4/20 for tooth extraction.    He was hospitalized on 7/30/2021 for shortness of breath, new CHF, A. fib with RVR and ibrutinib was put on hold.  He was seen by cardiology and eventually underwent cardioversion for the A. fib.  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  He was started on Eliquis and metoprolol.  His Plavix was stopped.    He went to the [facility] ED on 9/26/21 and found to have pericardial effusion.  Due to lack of beds, he was transferred to [facility] for acute pericardial effusion and pericarditis.  He was started on colchicine and prednisone taper x 6 weeks.  He was also given Bactrim for PJP prophylaxis due duration of prednisone course.  He was discharged on 10/1/21.      Since his last visit in clinic, he states he has had multiple hospitalizations for back pain. He informs me he has cardiology follow up as he was discovered to have coronary artery disease. He has had angiogram. There are recommendations in regards to CABG vs PCI. He states he is uncertain if he will move forward with this. He will be seen in 11/3/22.      INTERIM HISTORY:   In the interim, he has had significant weight loss, decrease in appetite and energy. Repeat imaging 1/17/24 showing new metastatic disease to the liver and he has elevation in LFTs. CEA is 200. He denies evidence of blood in stool.      He underwent liver biopsy with path confirming metastatic colon adneocarcinoma.      REVIEW OF SYSTEMS:   14 point ROS was reviewed and is negative other than as noted above in HPI.       HOME MEDICATIONS:  Current Outpatient Medications   Medication Sig Dispense Refill     Alcohol Swabs  "(ALCOHOL PREP) 70 % PADS        aspirin 81 MG EC tablet Take 1 tablet (81 mg) by mouth daily Hold after your biopsy.  You may resume on 1/26/24       blood glucose (NO BRAND SPECIFIED) lancets standard Use to test blood sugar 3 times daily or as directed. 100 each 3     blood glucose (NO BRAND SPECIFIED) test strip Contour Next Test Strips-Use to test blood sugar 3 times daily or as directed. 300 strip 1     Continuous Blood Gluc Sensor (DEXCOM G7 SENSOR) MISC 1 each every 10 days Change sensor every 10 days 3 each 5     Dulaglutide 4.5 MG/0.5ML SOPN Inject 4.5 mg Subcutaneous once a week 6 mL 4     HUMALOG KWIKPEN 100 UNIT/ML soln Sliding Scale insulin dosing:  For Blood glucose 141-180 mg/dL administer 2 units, 181-220 mg/dL administer 4 units, 221-260 mg/dL administer 6 units, 261-300 mg/dL administer 8 units, 301-350 mg/dL administer 10 units, 350-400 mg/dL administer 12 units, >400 mg/dL administer 14 units       insulin glargine (LANTUS PEN) 100 UNIT/ML pen Inject 10 Units Subcutaneous every morning (before breakfast) 15 mL 0     insulin pen needle (32G X 4 MM) 32G X 4 MM miscellaneous Use 4 pen needles daily or as directed. 360 each 3     leflunomide (ARAVA) 10 MG tablet Take 10 mg by mouth daily NOT STARTED       losartan (COZAAR) 25 MG tablet Take 1 tablet (25 mg) by mouth daily 90 tablet 3     metFORMIN (GLUCOPHAGE XR) 500 MG 24 hr tablet Take 2 tablets (1,000 mg) by mouth daily (with dinner) 180 tablet 4     metoprolol succinate ER (TOPROL XL) 100 MG 24 hr tablet Take 50 mg by mouth daily       Microlet Lancets MISC USE TO TEST BLOOD SUGAR 3 TIMES DAILY OR AS DIRECTED.       nitroGLYcerin (NITROSTAT) 0.4 MG sublingual tablet One tablet under the tongue every 5 minutes if needed for chest pain. May repeat every 5 minutes for a maximum of 3 doses in 15 minutes\" 25 tablet 3     pantoprazole (PROTONIX) 40 MG EC tablet Take 1 tablet (40 mg) by mouth daily 90 tablet 4     predniSONE (DELTASONE) 2.5 MG tablet " Take by mouth daily 3 tablets daily x 2 weeks (1/12-1/25), 2 tablets daily x 2 weeks (1/26-2/9), 1 tablets daily x 2 weeks (2/10-2/24)  Then stop       rosuvastatin (CRESTOR) 20 MG tablet Take 1 tablet (20 mg) by mouth daily 90 tablet 4     thiamine (B-1) 100 MG tablet Take 1 tablet (100 mg) by mouth daily 30 tablet 0         ALLERGIES:  Allergies   Allergen Reactions     Seasonal Allergies      Beta Adrenergic Blockers          PAST MEDICAL HISTORY:  Past Medical History:   Diagnosis Date     AAA (abdominal aortic aneurysm)      BCC (basal cell carcinoma of skin) - face      CLL (chronic lymphocytic leukemia)      Diaphragmatic hernia      Diverticulitis of colon      Esophageal reflux      Essential hypertension      Hyperlipidemia      Irritable bowel syndrome      Macular degeneration (senile) of retina      Mumps      Squamous Cell Carcinoma, Back 1988     Type 2 diabetes mellitus          PAST SURGICAL HISTORY:  Past Surgical History:   Procedure Laterality Date     ANESTHESIA CARDIOVERSION N/A 8/2/2021    Procedure: ANESTHESIA, FOR CARDIOVERSION;  Surgeon: GENERIC ANESTHESIA PROVIDER;  Location: [facility] OR     APPENDECTOMY OPEN  1966     BONE MARROW BIOPSY, BONE SPECIMEN, NEEDLE/TROCAR N/A 11/21/2017    Procedure: BIOPSY BONE MARROW;  BONE MARROW BIOPSY;  Surgeon: Shady Garcia MD;  Location: [facility]     COLECTOMY RIGHT Right 9/8/2023    Procedure: 1. Diagnostic laparoscopy 2. Open extended right colectomy with ileocolic anastomosis for curative intent  3. Full mobilization of the splenic flexure;  Surgeon: Vanessa Frankel MD;  Location: [facility] OR     COLONOSCOPY  2002     COLONOSCOPY N/A 7/28/2023    Procedure: COLONOSCOPY;  Surgeon: Julio Castro MD;  Location: [facility] OR     CV CORONARY ANGIOGRAM N/A 6/29/2022    Procedure: Coronary Angiogram;  Surgeon: Evaristo Beaulieu MD;  Location:  [facility]     ENDOVASCULAR REPAIR ANEURYSM ABDOMINAL AORTA N/A 12/4/2017    Procedure:  ENDOVASCULAR REPAIR ANEURYSM ABDOMINAL AORTA;  ENDOVASCULAR REPAIR ANEURYSM ABDOMINAL AORTA;  Surgeon: Milton Cazares MD;  Location: [facility] OR     Fistulotomy with marsupialization for repair of fisture in ano       IR ABDOMINAL AORTOGRAM  3/11/2019     IR VISCERAL EMBOLIZATION  2019     Multiple skin tags - resection  1998     Squamous cell skin cancer resection - back       VASECTOMY           SOCIAL HISTORY:  Social History     Socioeconomic History     Marital status:      Spouse name: librado     Number of children: 0     Years of education: 17     Highest education level: Not on file   Occupational History     Occupation: retired   Tobacco Use     Smoking status: Former     Packs/day: 0.50     Years: 20.00     Additional pack years: 0.00     Total pack years: 10.00     Types: Cigarettes, Pipe     Quit date: 1975     Years since quittin.1     Smokeless tobacco: Former   Vaping Use     Vaping Use: Never used   Substance and Sexual Activity     Alcohol use: Yes     Alcohol/week: 10.0 - 14.0 standard drinks of alcohol     Types: 10 - 14 Standard drinks or equivalent per week     Comment: cocktail on weekend and glass of wine almost daily     Drug use: No     Sexual activity: Never   Other Topics Concern     Parent/sibling w/ CABG, MI or angioplasty before 65F 55M? Not Asked   Social History Narrative     Not on file     Social Determinants of Health     Financial Resource Strain: Low Risk  (2023)    Financial Resource Strain      Within the past 12 months, have you or your family members you live with been unable to get utilities (heat, electricity) when it was really needed?: No   Food Insecurity: Low Risk  (2023)    Food Insecurity      Within the past 12 months, did you worry that your food would run out before you got money to buy more?: No      Within the past 12 months, did the food you bought just not last and you didn t have money to get more?: No    Transportation Needs: Low Risk  (2023)    Transportation Needs      Within the past 12 months, has lack of transportation kept you from medical appointments, getting your medicines, non-medical meetings or appointments, work, or from getting things that you need?: No   Physical Activity: Not on file   Stress: Not on file   Social Connections: Not on file   Interpersonal Safety: Low Risk  (2023)    Interpersonal Safety      Do you feel physically and emotionally safe where you currently live?: Yes      Within the past 12 months, have you been hit, slapped, kicked or otherwise physically hurt by someone?: No      Within the past 12 months, have you been humiliated or emotionally abused in other ways by your partner or ex-partner?: No   Housing Stability: Low Risk  (2023)    Housing Stability      Do you have housing? : Yes      Are you worried about losing your housing?: No   He quit smoking in .  He drinks 1-3 glasses of wine a night with dinner.  He is retired, and used to work as a .  He lives with his wife in [place].  His cousin had lung cancer and  around age 69.  Otherwise, he denies family history of cancer.        FAMILY HISTORY:  Family History   Problem Relation Age of Onset     Cerebrovascular Disease Father         x 2     Hypertension Mother      C.A.D. Mother      Cancer Mother         skin     Eye Disorder Mother         glaucoma     Prostate Cancer No family hx of      Cancer - colorectal No family hx of      Glaucoma No family hx of      Macular Degeneration No family hx of          PHYSICAL EXAM:  There were no vitals taken for this visit.  ECO  GENERAL/CONSTITUTIONAL: No acute distress.  GASTROINTESTINAL: Incisional scar is healing well.  EYES: No scleral icterus.  NEUROLOGIC: Alert, oriented, answers questions appropriately.  INTEGUMENTARY: No jaundice.    GAIT: Steady, does not use assistive device      LABS: Reviewed with patient today.     Latest Reference  Range & Units 07/29/23 05:52 08/03/23 14:13 01/17/24 09:47   CEA ng/mL 10.0 18.9 200.0             PATHOLOGY: Reviewed with patient.       Component Ref Range & Units    Case Report   Surgical Pathology Report                         Case: [id number]                                   Authorizing Provider:  Julio Castro MD Collected:           07/28/2023 02:40 PM           Ordering Location:     [facility]   Received:            07/28/2023 03:03 PM                                  Main OR                                                                       Pathologist:           Itzel Bains MD PhD                                                       Specimen:    Large Intestine, Colon, Transverse, Transverse colon mass                                  Final Diagnosis   A(1). Transverse colon mass, biopsy:  -Invasive moderately differentiated adenocarcinoma (see comment)      Addendum electronically signed by Itzel Bains MD PhD on 8/2/2023 at  7:50 AM  Electronically signed by Itzel Bains MD PhD on 7/31/2023 at  9:52 AM   Comment     Immunohistochemical studies for MMR proteins have been requested and will be reported as an addendum when available   Synoptic Checklist   Colon and Rectum Biomarker Reporting Template   Protocol posted: 6/30/2021(Added in Addendum) COLON AND RECTUM: BIOMARKER REPORTING TEMPLATE - All Specimens  RESULTS   Mismatch Repair     Immunohistochemistry (IHC) Testing for Mismatch Repair (MMR) Proteins     MLH1 Result  Intact nuclear expression   Immunohistochemistry (IHC) Testing for Mismatch Repair (MMR) Proteins     MSH2 Result  Intact nuclear expression   Immunohistochemistry (IHC) Testing for Mismatch Repair (MMR) Proteins     MSH6 Result  Intact nuclear expression   Immunohistochemistry (IHC) Testing for Mismatch Repair (MMR) Proteins     PMS2 Result  Intact nuclear expression   Immunohistochemistry (IHC) Testing for Mismatch Repair (MMR) Proteins   Background nonneoplastic tissue / internal control with intact nuclear expression   IHC Interpretation  No loss of nuclear expression of MMR proteins: low probability of MSI-H                  Component  Ref Range & Units  Resulting Agency   Case Report   Surgical Pathology Report                         Case: [id number]                                   Authorizing Provider:  Vanessa Frankel MD  Collected:           09/08/2023 04:40 PM           Ordering Location:     [facility]   Received:            09/08/2023 06:04 PM                                  Main OR                                                                       Pathologist:           Purnima Alvarez MD                                                                           Specimen:    Large Intestine, Colon, TERMINAL ILLEUM, RIGHT AND TRANSVERSE COLON WITH OMENTUM          Final Diagnosis   Intestine, including distal terminal ileum with right and transverse colon, extended right hemicolectomy:  -Poorly differentiated adenocarcinoma, 3.5 cm, with negative margins  -All (26) examined lymph nodes negative for metastatic carcinoma  -Tumor area appears to involve diverticula with adjacent micro abscesses and florid fibroinflammatory reaction  -Pericolonic adipose tissue with adhesions, chronic inflammation and fibrosis  -Please see synoptic tumor template within this report below      Electronically signed by Purnima Alvarez MD on 9/12/2023 at  3:09 PM   Comment  [facility]   Noted is the prior diagnosis (7/28/2023) of invasive moderately differentiated adenocarcinoma with mismatch repair protein evaluation showing intact nuclear expression.   Synoptic Checklist   COLON AND RECTUM: Resection, Including Transanal Disk Excision of Rectal Neoplasms   8th Edition - Protocol posted: 6/22/2022COLON AND RECTUM: RESECTION - All  Specimens  SPECIMEN   Procedure  Extended right hemicolectomy   TUMOR   Tumor Site  Transverse colon   Histologic Type  Adenocarcinoma   Histologic Grade  G3, poorly differentiated   Tumor Size  Greatest dimension (Centimeters): 3.5 cm   Tumor Extent  Invades through muscularis propria into the pericolonic or perirectal tissue   Macroscopic Tumor Perforation  Not identified   Lymphovascular Invasion  Not identified   Perineural Invasion  Not identified   Number of Tumor Buds  22.3 per 'hotspot' field   Tumor Bud Score  High (10 or more)   Type of Polyp in which Invasive Carcinoma Arose  Tubular adenoma   Treatment Effect  No known presurgical therapy   MARGINS   Margin Status for Invasive Carcinoma  All margins negative for invasive carcinoma   Closest Margin(s) to Invasive Carcinoma  Distal   Distance from Invasive Carcinoma to Closest Margin  2.7 cm   Margin Status for Non-Invasive Tumor  All margins negative for high-grade dysplasia / intramucosal carcinoma and low-grade dysplasia   REGIONAL LYMPH NODES   Regional Lymph Node Status  All regional lymph nodes negative for tumor   Number of Lymph Nodes Examined  26   Tumor Deposits  Not identified   PATHOLOGIC STAGE CLASSIFICATION (pTNM, AJCC 8th Edition)   Reporting of pT, pN, and (when applicable) pM categories is based on information available to the pathologist at the time the report is issued. As per the AJCC (Chapter 1, 8th Ed.) it is the managing physician's responsibility to establish the final pathologic stage based upon all pertinent information, including but potentially not limited to this pathology report.   pT Category  pT3   pN Category  pN0   ADDITIONAL FINDINGS   Additional Findings  Diverticulosis     Tumor appears to involves diverticuli with multiple areas of abscess and associated florid fibroinflammatory tissue reaction        Case Report   Surgical Pathology Report                         Case: [id number]                                    Authorizing Provider:  Elizabeth Hi Collected:           01/23/2024 02:41 PM                                  JESSICA BRUMFIELD                                                                       Ordering Location:     [facility]   Received:            01/23/2024 02:59 PM                                  Observation Dept                                                             Pathologist:           Itzel Bains MD                                                           Specimen:    Liver                                                                                      Final Diagnosis   A(A).  Liver, biopsy:  -Adenocarcinoma, most consistent with metastasis from colorectal primary.       IMAGING: Reviewed with patient.  CT CAP 8/16/23 (outside institution):  4 cm segment of irregular bowel wall thickening seen in the mid transverse colon, suspicious for colonic neoplasm.  Several prominent pericolonic lymph nodes are seen in the adjacent mesentery, raising possibility of local arun metastasis.  No bowel obstruction, free intraperitoneal air or abscess is seen.  4 mm nodular density at the right lung base peripherally, nonspecific and amenable to follow-up.  Mildly prominent right axillary lymph nodes, nonspecific and possibly reactive, amenable to follow-up.  Cholelithiasis without CT evidence for cholecystitis.  Mild bladder wall thickening, possibly related to underdistention.  Correlate clinically with urinalysis to exclude urinary tract infection or inflammation.    CT A/P 9/14/23:  IMPRESSION:   1.  Interval right hemicolectomy with expected pneumoperitoneum.  2.  Dilated fluid-filled small bowel loops compatible with ileus.  3.  Severe distal colonic diverticulosis.  4.  Trace left effusion and mild bibasilar atelectasis.  5.  Prior abdominal aortic aneurysm repair.    CT CAP 9/28/23:  IMPRESSION:  1.  No evidence for pulmonary embolism, acute thoracic aortic  abnormality, or  acute airspace disease.  2.  Mildly enlarged ascending thoracic aorta measures smaller than on  9/26/2021.    CT CAP 1/17/24:  IMPRESSION:  1.  Interval development of multiple masses within the liver diffusely  consistent with new liver metastatic disease.  2.  Slightly larger upper abdominal lymph nodes are indeterminate.  3.  No new disease in the chest. Stable small pulmonary nodules.  4.  Stable endovascular repair of the abdominal aorta. 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 Stable mild enlargement of the ascending  thoracic aorta.  5.  Stable splenomegaly.      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  -CT CAP done 8/2023 at [facility] without evidence of metastatic disease.  -CEA 18.9 upon diagnosis.   -On 8/3/23, ferritin 105, iron 34, TIBC 328, Iron sat 10%. 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 S/p venofer 300 mg x3.  -Diagnostic lap 9/8/23 with open extended right colectomy with ileocolic anastomosis and full mobilization of the splenic flexure: transverse colon adenocarcinoma, grade 3, no perforation/LVI/PNI, high tumor bud score, all  margins negative, 0/26 lymph nodes.  -We discussed high risk features in the stage II setting to include high grade and high tumor bud score. We discussed the possibility of adjuvant treatment with capecitabine x6 months or fluorouracil/leucovorin x 6 months vs observation. We discussed the risk of treatment and given his age and comorbidities, patient had elected for observation. Unfortunately, patient was then admitted 1/2024 for failure to thrive, symptomatic anemia, and discovered to have metastatic disease  to the liver. While hospitalized, we discussed goals of care including hospice. Patient and family wished to pursue biopsy and to discuss the possibility of systemic treatment. Liver biopsy returned as metastatic adenocarcinoma (BELLA, JASIEL pending). He was discharged to home with [facility]. We discussed the palliative nature of systemic treatment. We reviewed potential toxicities including cardiac toxicity, heart failure, ACS, renal and heaptic failure, risk of neutropenic fever, blood transfusion needs, and risk of neuropathy. He and wife have consented to treatment. However, they are hoping to meet with [facility] for second opinion. They also continues to work with [facility] and will meet with hospice as well.    Leucovorin 350 mg/m2 IV IV on day 1  Oxaliplatin 85 mg/m2 IV on day 1--> 25% dose reduction  Fluorouracil 400 mg/m2 IV on day 1--> Omit bolus  Fluorouracil 2400 mg/m2 CIV over 46 hours--> 25% dose reduction  Bevacizumab 5 mg/kg IV on day 1--> add on to cycle 2 or 3 depending on tolerance  Every 2 week cycles    -CT CAP due in 12 weeks.      2) CLL/SLL: BLACKWOOD stage III, with lymphocytosis, lymphadenopathy, splenomegaly, and anemia.  He has mild thrombocytopenia as well, but is above 100K.    -He presented with leukocytosis with WBC of 61.7K, hemoglobin of 10.4, and platelet count of 115K on diagnosis.  Bone marrow biopsy and flow cytometry confirmed CLL/SLL.  CT scan shows mildly enlarged left axillary lymph node and periaortic lymph nodes in the retroperitoneum of the abdomen, with enlarged spleen.    -Due to worsening lymphocyte count, anemia, thrombocytopenia, as well as fatigue and night sweats, he started ibrutinib on 5/4/20.    -In light of his recent issues with atrial fibrillation and now on Eliquis, his ibrutinib has been on hold since August 2021.  Considering his CLL is under good control, his WBC is actually on the lower end of normal, and with risk of atrial fibrillation and bleeding on  anticoagulants, we will continue to hold ibrutinib for now and monitor his counts. He has persistent thrombocytopenia. He will likely need to resume treatment at some point, but we can follow the counts and we could also consider other treatments as well for CLL.  -Patient's thrombocytopenia has improved (>100K since June 2023). WBCs are ranging 13-14, ALC 8-9%. Will continue to monitor.    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 He completed radiation at [facility] with planned 60 Gy x 30 fractions. He has completed radiation and noted that he did not need any more follow-up for this.  -He continues to follow with dermatology and just had removal of basal cell carcinoma 9/30/23.    3) Abdominal aortic aneurysm: measures up to 7.6 cm on CT scan, incidentally found.  He underwent EVAR on 12/4/17.  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  He is on Plavix now.  On 5/13/19, he underwent and percutaneous aneurysm sac puncture and endo leak embolization by IR on 5/13/19.  -Follow-up with vascular surgery and IR.    4) Diabetes, hypertension:    -Following with PCP.    5) Atrial fibrillation with RVR s/p successful DCCV to SR:  -On metoprolol. He is off eliquis.  -Following with cardiology.    6) CAD:   -Follow-up with cardiology  -There are continued discussions in regards to PCI/CABG. He is uncertain if he is following with [facility] or locally.  -Plavix is currently held per hospital discharge. He is on aspirin 81 mg daily.    7) Symptomatic anemia  -s/p blood transfusion.    8) Elevated LFTs  -Due to tumor burden.    9) -NGS, CARIS pending.  -In need of updated ECHO and clearance by cardiology.  -Schedule for port placement.  -Chemo education on FOLFOX +/- bevacizumab.  -He continues to follow with sahil who has also arranged for hospice meeting. He also is requesting second opinion with [facility], which I  have encouraged.  -Follow up with me in 2 weeks with C1D1.      Miriam Badillo DO  Hematology/Oncology  [facility]      Again, thank you for allowing me to participate in the care of your patient.        Sincerely,        Miriam Badillo DO

## 2024-01-30 NOTE — PROGRESS NOTES
[facility]    Hematology/Oncology Established Patient Note      Today's Date: 1/30/24    Reason for Follow-up: CLL      HISTORY OF PRESENT ILLNESS: Austin Garza is an 83 year old male with CLL previously on ibrutinib and followed by Dr. Perry.    He has PMHx of DMII, HTN who presented with leukocytosis.  In November 2017, he was found to have elevated WBC of 61.7K, hemoglobin of 10.4, and platelet of 115K.  There were no other CBC results available between 2010 and 2017.  Prior to 2010, he had normal CBC, with normal to mild anemia, and mild thrombocytopenia.   He was asymptomatic.    He underwent bone marrow biopsy on 11/21/17, which was consistent with chronic lymphocytic leukemia/small lymphocytic lymphoma, with 13% ringed sideroblasts identified.  The presence of increased numbers of ringed sideroblasts raises the possibility of an associated myelodysplastic syndrome.  Dysplastic changes are not identified though.  Many cases exhibiting ringed sideroblasts are metabolic origin, without significant risk of progression to acute leukemia, and these typically do not show dysplastic morphology as is the case with this specimen.  15% ringed sideroblasts are required for a diagnosis for RARS, which this specimen does not meet.  Flow cytometry is also consistent with CLL/SLL.  Cytogenetics show two (10%) of the metaphase cells comprise a clone characterized by a deletion within the proximal long arm of a chromosome 13 as the sole karyotypic abnormality.  Three (15%) metaphases had loss of the Y chromosome as the sole abnormality.    CT scan on 11/29/17 shows mildly enlarged left axillary lymph node and periaortic lymph nodes in the retroperitoneum of the abdomen.  Spleen is also enlarged.    On his staging CT scan for CLL, he was incidentally found to have a large infrarenal abdominal aortic aneurysm, measuring 7.6 cm.  He was seen by Dr. Thomas, and he underwent EVAR on 12/4/17.     He  started ibrutinib on 5/4/20.  It was held 5/28/20-6/4/20 for tooth extraction.    He was hospitalized on 7/30/2021 for shortness of breath, new CHF, A. fib with RVR and ibrutinib was put on hold.  He was seen by cardiology and eventually underwent cardioversion for the A. fib.  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  He was started on Eliquis and metoprolol.  His Plavix was stopped.    He went to the [facility] ED on 9/26/21 and found to have pericardial effusion.  Due to lack of beds, he was transferred to [facility] for acute pericardial effusion and pericarditis.  He was started on colchicine and prednisone taper x 6 weeks.  He was also given Bactrim for PJP prophylaxis due duration of prednisone course.  He was discharged on 10/1/21.      Since his last visit in clinic, he states he has had multiple hospitalizations for back pain. He informs me he has cardiology follow up as he was discovered to have coronary artery disease. He has had angiogram. There are recommendations in regards to CABG vs PCI. He states he is uncertain if he will move forward with this. He will be seen in 11/3/22.      INTERIM HISTORY:   In the interim, he has had significant weight loss, decrease in appetite and energy. Repeat imaging 1/17/24 showing new metastatic disease to the liver and he has elevation in LFTs. CEA is 200. He denies evidence of blood in stool.      He underwent liver biopsy with path confirming metastatic colon adneocarcinoma.      REVIEW OF SYSTEMS:   14 point ROS was reviewed and is negative other than as noted above in HPI.       HOME MEDICATIONS:  Current Outpatient Medications   Medication Sig Dispense Refill    Alcohol Swabs (ALCOHOL PREP) 70 % PADS       aspirin 81 MG EC tablet Take 1 tablet (81 mg) by mouth daily Hold after your biopsy.  You may resume on 1/26/24      blood glucose (NO BRAND SPECIFIED) lancets standard Use to test blood sugar 3 times daily or as directed. 100 each 3  "   blood glucose (NO BRAND SPECIFIED) test strip Contour Next Test Strips-Use to test blood sugar 3 times daily or as directed. 300 strip 1    Continuous Blood Gluc Sensor (DEXCOM G7 SENSOR) MISC 1 each every 10 days Change sensor every 10 days 3 each 5    Dulaglutide 4.5 MG/0.5ML SOPN Inject 4.5 mg Subcutaneous once a week 6 mL 4    HUMALOG KWIKPEN 100 UNIT/ML soln Sliding Scale insulin dosing:  For Blood glucose 141-180 mg/dL administer 2 units, 181-220 mg/dL administer 4 units, 221-260 mg/dL administer 6 units, 261-300 mg/dL administer 8 units, 301-350 mg/dL administer 10 units, 350-400 mg/dL administer 12 units, >400 mg/dL administer 14 units      insulin glargine (LANTUS PEN) 100 UNIT/ML pen Inject 10 Units Subcutaneous every morning (before breakfast) 15 mL 0    insulin pen needle (32G X 4 MM) 32G X 4 MM miscellaneous Use 4 pen needles daily or as directed. 360 each 3    leflunomide (ARAVA) 10 MG tablet Take 10 mg by mouth daily NOT STARTED      losartan (COZAAR) 25 MG tablet Take 1 tablet (25 mg) by mouth daily 90 tablet 3    metFORMIN (GLUCOPHAGE XR) 500 MG 24 hr tablet Take 2 tablets (1,000 mg) by mouth daily (with dinner) 180 tablet 4    metoprolol succinate ER (TOPROL XL) 100 MG 24 hr tablet Take 50 mg by mouth daily      Microlet Lancets MISC USE TO TEST BLOOD SUGAR 3 TIMES DAILY OR AS DIRECTED.      nitroGLYcerin (NITROSTAT) 0.4 MG sublingual tablet One tablet under the tongue every 5 minutes if needed for chest pain. May repeat every 5 minutes for a maximum of 3 doses in 15 minutes\" 25 tablet 3    pantoprazole (PROTONIX) 40 MG EC tablet Take 1 tablet (40 mg) by mouth daily 90 tablet 4    predniSONE (DELTASONE) 2.5 MG tablet Take by mouth daily 3 tablets daily x 2 weeks (1/12-1/25), 2 tablets daily x 2 weeks (1/26-2/9), 1 tablets daily x 2 weeks (2/10-2/24)  Then stop      rosuvastatin (CRESTOR) 20 MG tablet Take 1 tablet (20 mg) by mouth daily 90 tablet 4    thiamine (B-1) 100 MG tablet Take 1 tablet " (100 mg) by mouth daily 30 tablet 0         ALLERGIES:  Allergies   Allergen Reactions    Seasonal Allergies     Beta Adrenergic Blockers          PAST MEDICAL HISTORY:  Past Medical History:   Diagnosis Date    AAA (abdominal aortic aneurysm)     BCC (basal cell carcinoma of skin) - face     CLL (chronic lymphocytic leukemia)     Diaphragmatic hernia     Diverticulitis of colon     Esophageal reflux     Essential hypertension     Hyperlipidemia     Irritable bowel syndrome     Macular degeneration (senile) of retina     Mumps     Squamous Cell Carcinoma, Back 1988    Type 2 diabetes mellitus          PAST SURGICAL HISTORY:  Past Surgical History:   Procedure Laterality Date    ANESTHESIA CARDIOVERSION N/A 8/2/2021    Procedure: ANESTHESIA, FOR CARDIOVERSION;  Surgeon: GENERIC ANESTHESIA PROVIDER;  Location: [facility] OR    APPENDECTOMY OPEN  1966    BONE MARROW BIOPSY, BONE SPECIMEN, NEEDLE/TROCAR N/A 11/21/2017    Procedure: BIOPSY BONE MARROW;  BONE MARROW BIOPSY;  Surgeon: Shady Garcia MD;  Location:  GI    COLECTOMY RIGHT Right 9/8/2023    Procedure: 1. Diagnostic laparoscopy 2. Open extended right colectomy with ileocolic anastomosis for curative intent  3. Full mobilization of the splenic flexure;  Surgeon: Vanessa Frankel MD;  Location:  OR    COLONOSCOPY  2002    COLONOSCOPY N/A 7/28/2023    Procedure: COLONOSCOPY;  Surgeon: Julio Castro MD;  Location:  OR    CV CORONARY ANGIOGRAM N/A 6/29/2022    Procedure: Coronary Angiogram;  Surgeon: Evaristo Beaulieu MD;  Location:  HEART CARDI[facility] CATH LAB    ENDOVASCULAR REPAIR ANEURYSM ABDOMINAL AORTA N/A 12/4/2017    Procedure: ENDOVASCULAR REPAIR ANEURYSM ABDOMINAL AORTA;  ENDOVASCULAR REPAIR ANEURYSM ABDOMINAL AORTA;  Surgeon: Milton Cazares MD;  Location:  OR    Fistulotomy with marsupialization for repair of fisture in ano  2007    IR ABDOMINAL AORTOGRAM  3/11/2019    IR VISCERAL EMBOLIZATION  5/13/2019    Multiple skin  tags - resection  1998    Squamous cell skin cancer resection - back      VASECTOMY           SOCIAL HISTORY:  Social History     Socioeconomic History    Marital status:      Spouse name: librado    Number of children: 0    Years of education: 17    Highest education level: Not on file   Occupational History    Occupation: retired   Tobacco Use    Smoking status: Former     Packs/day: 0.50     Years: 20.00     Additional pack years: 0.00     Total pack years: 10.00     Types: Cigarettes, Pipe     Quit date: 1975     Years since quittin.1    Smokeless tobacco: Former   Vaping Use    Vaping Use: Never used   Substance and Sexual Activity    Alcohol use: Yes     Alcohol/week: 10.0 - 14.0 standard drinks of alcohol     Types: 10 - 14 Standard drinks or equivalent per week     Comment: cocktail on weekend and glass of wine almost daily    Drug use: No    Sexual activity: Never   Other Topics Concern    Parent/sibling w/ CABG, MI or angioplasty before 65F 55M? Not Asked   Social History Narrative    Not on file     Social Determinants of Health     Financial Resource Strain: Low Risk  (2023)    Financial Resource Strain     Within the past 12 months, have you or your family members you live with been unable to get utilities (heat, electricity) when it was really needed?: No   Food Insecurity: Low Risk  (2023)    Food Insecurity     Within the past 12 months, did you worry that your food would run out before you got money to buy more?: No     Within the past 12 months, did the food you bought just not last and you didn t have money to get more?: No   Transportation Needs: Low Risk  (2023)    Transportation Needs     Within the past 12 months, has lack of transportation kept you from medical appointments, getting your medicines, non-medical meetings or appointments, work, or from getting things that you need?: No   Physical Activity: Not on file   Stress: Not on file   Social  Connections: Not on file   Interpersonal Safety: Low Risk  (2023)    Interpersonal Safety     Do you feel physically and emotionally safe where you currently live?: Yes     Within the past 12 months, have you been hit, slapped, kicked or otherwise physically hurt by someone?: No     Within the past 12 months, have you been humiliated or emotionally abused in other ways by your partner or ex-partner?: No   Housing Stability: Low Risk  (2023)    Housing Stability     Do you have housing? : Yes     Are you worried about losing your housing?: No   He quit smoking in .  He drinks 1-3 glasses of wine a night with dinner.  He is retired, and used to work as a .  He lives with his wife in [place].  His cousin had lung cancer and  around age 69.  Otherwise, he denies family history of cancer.        FAMILY HISTORY:  Family History   Problem Relation Age of Onset    Cerebrovascular Disease Father         x 2    Hypertension Mother     C.A.D. Mother     Cancer Mother         skin    Eye Disorder Mother         glaucoma    Prostate Cancer No family hx of     Cancer - colorectal No family hx of     Glaucoma No family hx of     Macular Degeneration No family hx of          PHYSICAL EXAM:  There were no vitals taken for this visit.  ECO  GENERAL/CONSTITUTIONAL: No acute distress.  GASTROINTESTINAL: Incisional scar is healing well.  EYES: No scleral icterus.  NEUROLOGIC: Alert, oriented, answers questions appropriately.  INTEGUMENTARY: No jaundice.    GAIT: Steady, does not use assistive device      LABS: Reviewed with patient today.     Latest Reference Range & Units 23 05:52 23 14:13 24 09:47   CEA ng/mL 10.0 18.9 200.0             PATHOLOGY: Reviewed with patient.       Component Ref Range & Units    Case Report   Surgical Pathology Report                         Case: [id number]                                   Authorizing Provider:  Julio Castro MD Collected:            07/28/2023 02:40 PM           Ordering Location:     [facility]   Received:            07/28/2023 03:03 PM                                  Main OR                                                                       Pathologist:           Itzel Bains MD PhD                                                       Specimen:    Large Intestine, Colon, Transverse, Transverse colon mass                                  Final Diagnosis   A(1). Transverse colon mass, biopsy:  -Invasive moderately differentiated adenocarcinoma (see comment)      Addendum electronically signed by Itzel Bains MD PhD on 8/2/2023 at  7:50 AM  Electronically signed by Itzel Bains MD PhD on 7/31/2023 at  9:52 AM   Comment     Immunohistochemical studies for MMR proteins have been requested and will be reported as an addendum when available   Synoptic Checklist   Colon and Rectum Biomarker Reporting Template   Protocol posted: 6/30/2021(Added in Addendum) COLON AND RECTUM: BIOMARKER REPORTING TEMPLATE - All Specimens  RESULTS   Mismatch Repair     Immunohistochemistry (IHC) Testing for Mismatch Repair (MMR) Proteins     MLH1 Result  Intact nuclear expression   Immunohistochemistry (IHC) Testing for Mismatch Repair (MMR) Proteins     MSH2 Result  Intact nuclear expression   Immunohistochemistry (IHC) Testing for Mismatch Repair (MMR) Proteins     MSH6 Result  Intact nuclear expression   Immunohistochemistry (IHC) Testing for Mismatch Repair (MMR) Proteins     PMS2 Result  Intact nuclear expression   Immunohistochemistry (IHC) Testing for Mismatch Repair (MMR) Proteins  Background nonneoplastic tissue / internal control with intact nuclear expression   IHC Interpretation  No loss of nuclear expression of MMR proteins: low probability of MSI-H                  Component  Ref Range & Units  Resulting Agency   Case Report   Surgical Pathology Report                         Case: [id number]                                    Authorizing Provider:  Vanessa Frankel MD  Collected:           09/08/2023 04:40 PM           Ordering Location:     [facility]   Received:            09/08/2023 06:04 PM                                  Main OR                                                                       Pathologist:           Purnima Alvarez MD                                                                           Specimen:    Large Intestine, Colon, TERMINAL ILLEUM, RIGHT AND TRANSVERSE COLON WITH OMENTUM          Final Diagnosis   Intestine, including distal terminal ileum with right and transverse colon, extended right hemicolectomy:  -Poorly differentiated adenocarcinoma, 3.5 cm, with negative margins  -All (26) examined lymph nodes negative for metastatic carcinoma  -Tumor area appears to involve diverticula with adjacent micro abscesses and florid fibroinflammatory reaction  -Pericolonic adipose tissue with adhesions, chronic inflammation and fibrosis  -Please see synoptic tumor template within this report below      Electronically signed by Purnima Alvarez MD on 9/12/2023 at  3:09 PM   Comment  [facility]   Noted is the prior diagnosis (7/28/2023) of invasive moderately differentiated adenocarcinoma with mismatch repair protein evaluation showing intact nuclear expression.   Synoptic Checklist   COLON AND RECTUM: Resection, Including Transanal Disk Excision of Rectal Neoplasms   8th Edition - Protocol posted: 6/22/2022COLON AND RECTUM: RESECTION - All Specimens  SPECIMEN   Procedure  Extended right hemicolectomy   TUMOR   Tumor Site  Transverse colon   Histologic Type  Adenocarcinoma   Histologic Grade  G3, poorly differentiated   Tumor Size  Greatest dimension (Centimeters): 3.5 cm   Tumor Extent  Invades through muscularis propria into the pericolonic or perirectal tissue   Macroscopic Tumor Perforation  Not  identified   Lymphovascular Invasion  Not identified   Perineural Invasion  Not identified   Number of Tumor Buds  22.3 per 'hotspot' field   Tumor Bud Score  High (10 or more)   Type of Polyp in which Invasive Carcinoma Arose  Tubular adenoma   Treatment Effect  No known presurgical therapy   MARGINS   Margin Status for Invasive Carcinoma  All margins negative for invasive carcinoma   Closest Margin(s) to Invasive Carcinoma  Distal   Distance from Invasive Carcinoma to Closest Margin  2.7 cm   Margin Status for Non-Invasive Tumor  All margins negative for high-grade dysplasia / intramucosal carcinoma and low-grade dysplasia   REGIONAL LYMPH NODES   Regional Lymph Node Status  All regional lymph nodes negative for tumor   Number of Lymph Nodes Examined  26   Tumor Deposits  Not identified   PATHOLOGIC STAGE CLASSIFICATION (pTNM, AJCC 8th Edition)   Reporting of pT, pN, and (when applicable) pM categories is based on information available to the pathologist at the time the report is issued. As per the AJCC (Chapter 1, 8th Ed.) it is the managing physician's responsibility to establish the final pathologic stage based upon all pertinent information, including but potentially not limited to this pathology report.   pT Category  pT3   pN Category  pN0   ADDITIONAL FINDINGS   Additional Findings  Diverticulosis     Tumor appears to involves diverticuli with multiple areas of abscess and associated florid fibroinflammatory tissue reaction        Case Report   Surgical Pathology Report                         Case: [id number]                                   Authorizing Provider:  Elizabeth Hi Collected:           01/23/2024 02:41 PM                                  JESSICA BRUMFIELD                                                                       Ordering Location:     [facility]   Received:            01/23/2024 02:59 PM                                  Observation Dept                                                              Pathologist:           Itzel Bains MD                                                           Specimen:    Liver                                                                                      Final Diagnosis   A(A).  Liver, biopsy:  -Adenocarcinoma, most consistent with metastasis from colorectal primary.       IMAGING: Reviewed with patient.  CT CAP 8/16/23 (outside institution):  4 cm segment of irregular bowel wall thickening seen in the mid transverse colon, suspicious for colonic neoplasm.  Several prominent pericolonic lymph nodes are seen in the adjacent mesentery, raising possibility of local arun metastasis.  No bowel obstruction, free intraperitoneal air or abscess is seen.  4 mm nodular density at the right lung base peripherally, nonspecific and amenable to follow-up.  Mildly prominent right axillary lymph nodes, nonspecific and possibly reactive, amenable to follow-up.  Cholelithiasis without CT evidence for cholecystitis.  Mild bladder wall thickening, possibly related to underdistention.  Correlate clinically with urinalysis to exclude urinary tract infection or inflammation.    CT A/P 9/14/23:  IMPRESSION:   1.  Interval right hemicolectomy with expected pneumoperitoneum.  2.  Dilated fluid-filled small bowel loops compatible with ileus.  3.  Severe distal colonic diverticulosis.  4.  Trace left effusion and mild bibasilar atelectasis.  5.  Prior abdominal aortic aneurysm repair.    CT CAP 9/28/23:  IMPRESSION:  1.  No evidence for pulmonary embolism, acute thoracic aortic  abnormality, or acute airspace disease.  2.  Mildly enlarged ascending thoracic aorta measures smaller than on  9/26/2021.    CT CAP 1/17/24:  IMPRESSION:  1.  Interval development of multiple masses within the liver diffusely  consistent with new liver metastatic disease.  2.  Slightly larger upper abdominal lymph nodes are indeterminate.  3.  No new disease in the chest. Stable small  pulmonary nodules.  4.  Stable endovascular repair of the abdominal aorta. 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 Stable mild enlargement of the ascending  thoracic aorta.  5.  Stable splenomegaly.      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  -CT CAP done 8/2023 at [facility] without evidence of metastatic disease.  -CEA 18.9 upon diagnosis.   -On 8/3/23, ferritin 105, iron 34, TIBC 328, Iron sat 10%. 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 S/p venofer 300 mg x3.  -Diagnostic lap 9/8/23 with open extended right colectomy with ileocolic anastomosis and full mobilization of the splenic flexure: transverse colon adenocarcinoma, grade 3, no perforation/LVI/PNI, high tumor bud score, all  margins negative, 0/26 lymph nodes.  -We discussed high risk features in the stage II setting to include high grade and high tumor bud score. We discussed the possibility of adjuvant treatment with capecitabine x6 months or fluorouracil/leucovorin x 6 months vs observation. We discussed the risk of treatment and given his age and comorbidities, patient had elected for observation. Unfortunately, patient was then admitted 1/2024 for failure to thrive, symptomatic anemia, and discovered to have metastatic disease to the liver. While hospitalized, we discussed goals of care including hospice. Patient and family wished to pursue biopsy and to discuss the possibility of systemic treatment. Liver biopsy returned as metastatic adenocarcinoma (JASIEL BLANCO pending). He was discharged to home with [facility]. We discussed the palliative nature of systemic treatment. We reviewed potential  toxicities including cardiac toxicity, heart failure, ACS, renal and heaptic failure, risk of neutropenic fever, blood transfusion needs, and risk of neuropathy. He and wife have consented to treatment. However, they are hoping to meet with [facility] for second opinion. They also continues to work with [employer] and will meet with hospice as well.    Leucovorin 350 mg/m2 IV IV on day 1  Oxaliplatin 85 mg/m2 IV on day 1--> 25% dose reduction  Fluorouracil 400 mg/m2 IV on day 1--> Omit bolus  Fluorouracil 2400 mg/m2 CIV over 46 hours--> 25% dose reduction  Bevacizumab 5 mg/kg IV on day 1--> add on to cycle 2 or 3 depending on tolerance  Every 2 week cycles    -CT CAP due in 12 weeks.      2) CLL/SLL: BLACKWOOD stage III, with lymphocytosis, lymphadenopathy, splenomegaly, and anemia.  He has mild thrombocytopenia as well, but is above 100K.    -He presented with leukocytosis with WBC of 61.7K, hemoglobin of 10.4, and platelet count of 115K on diagnosis.  Bone marrow biopsy and flow cytometry confirmed CLL/SLL.  CT scan shows mildly enlarged left axillary lymph node and periaortic lymph nodes in the retroperitoneum of the abdomen, with enlarged spleen.    -Due to worsening lymphocyte count, anemia, thrombocytopenia, as well as fatigue and night sweats, he started ibrutinib on 5/4/20.    -In light of his recent issues with atrial fibrillation and now on Eliquis, his ibrutinib has been on hold since August 2021.  Considering his CLL is under good control, his WBC is actually on the lower end of normal, and with risk of atrial fibrillation and bleeding on anticoagulants, we will continue to hold ibrutinib for now and monitor his counts. He has persistent thrombocytopenia. He will likely need to resume treatment at some point, but we can follow the counts and we could also consider other treatments as well for CLL.  -Patient's thrombocytopenia has improved (>100K since June 2023). WBCs are ranging 13-14, ALC 8-9%. Will continue  to monitor.    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 He completed [facility] at [facility] with planned 60 Gy x 30 fractions. He has completed radiation and noted that he did not need any more follow-up for this.  -He continues to follow with dermatology and just had removal of basal cell carcinoma 9/30/23.    3) Abdominal aortic aneurysm: measures up to 7.6 cm on CT scan, incidentally found.  He underwent EVAR on 12/4/17.  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  He is on Plavix now.  On 5/13/19, he underwent and percutaneous aneurysm sac puncture and endo leak embolization by IR on 5/13/19.  -Follow-up with vascular surgery and IR.    4) Diabetes, hypertension:    -Following with PCP.    5) Atrial fibrillation with RVR s/p successful DCCV to SR:  -On metoprolol. He is off eliquis.  -Following with cardiology.    6) CAD:   -Follow-up with cardiology  -There are continued discussions in regards to PCI/CABG. He is uncertain if he is following with [place] or locally.  -Plavix is currently held per hospital discharge. He is on aspirin 81 mg daily.    7) Symptomatic anemia  -s/p blood transfusion.    8) Elevated LFTs  -Due to tumor burden.    9) -NGS, [facility] pending.  -In need of updated ECHO and clearance by cardiology.  -Schedule for port placement.  -Chemo education on FOLFOX +/- bevacizumab.  -He continues to follow with [facility] who has also arranged for hospice meeting. He also is requesting second opinion with [facility], which I have encouraged.  -Follow up with me in 2 weeks with C1D1.      Miriam Badillo,   Hematology/Oncology  [facility]

## 2024-01-30 NOTE — TELEPHONE ENCOUNTER
See the MC message from spouse. Sent  message requesting clarification. Will await for their response.     SHAMIR Jauregui  Patient Advocate Liason (PAL)  [facility]  Ph. [phone] / Fax. [phone]

## 2024-01-30 NOTE — PROGRESS NOTES
Medical Assistant Note:  Austin Garza presents today for blood work.    Patient seen by provider today: Yes: Dr. Badillo.   present during visit today: Not Applicable.    Concerns: No Concerns.    Procedure:  Lab draw site: right antecub, Needle type: butterfly, Gauge: 23.    Post Assessment:  Labs drawn without difficulty: Yes.    Discharge Plan:  Departure Mode: Ambulatory.    Face to Face Time: 10 minutes.    Elizabeth Layton MA

## 2024-01-30 NOTE — TELEPHONE ENCOUNTER
Call received from [phone]) [facility]  requesting the following PT orders:  Twice a week x 2 weeks  Once a week x 2 weeks    After huddling with , provided verbal ok for the alex orders.    SHAMIR Jauregui  Patient Advocate Liason (PAL)  [facility]  Ph. [phone] / Fax. [phone]

## 2024-01-31 NOTE — TELEPHONE ENCOUNTER
Recommendation from Klaudia are here. Will await to see 's recommendation.     Here are my thoughts:     Hi HR was up yesterday and he does have afib, I don't think I could suggest coming off metoprolol completely. But we could have him hold losartan. Blood pressure was low and potassium on the high end yesterday. Or if prefers to wait until 2/13 that is ok too.     Pulse Readings from Last 3 Encounters:   01/30/24 : 100   01/24/24 : 82   12/27/23 : 89     BP Readings from Last 3 Encounters:   01/30/24 : 108/68   01/24/24 : 124/61   12/27/23 : 102/54       Potassium       Date                     Value               Ref Range           Status                01/30/2024               5.0                 3.4 - 5.3 mmol*     Final                 08/02/2022               3.9                 3.4 - 5.3 mmol*     Final                 06/01/2021               4.2                 3.4 - 5.3 mmol*     Final            ----------     SHAMIR Jauregui  Patient Advocate Liadiaz (PAL)  [facility]  Ph. [phone] / Fax. [phone]

## 2024-01-31 NOTE — PROGRESS NOTES
Dr. Badillo has ordered CARIS testing on specimen [id number]. Tumor profiling requisition form has been completed and faxed to [facility]. A copy of the tumor profiling requisition form has been scanned into Benji's chart.     Letty Arevalo RN on 1/31/2024 at 11:26 AM

## 2024-01-31 NOTE — TELEPHONE ENCOUNTER
Home Care is calling regarding an established patient with [facility].       Requesting orders from: Luma Kauffman  Provider is following patient: Yes  Is this a 60-day recertification request?  No    Orders Requested    Occupational Therapy  Request for delay in care, service is not able to be provided within same scheduled day.   Caregiver requested therapy be rescheduled to Friday 2/2.     Information was gathered and will be sent to provider for review.  RN will contact Home Care with information after provider review.  Confirmed ok to leave a detailed message with call back.  Contact information confirmed and updated as needed.    Mirian with [employer]   [phone]  OK to [facility]    Mele Hernandez RN on 1/31/2024 at 10:32 AM

## 2024-01-31 NOTE — PROGRESS NOTES
has received a fax from the [facility] requesting patient's medical records including MD office notes, biopsy results, imaging, and lab work.  has faxed an URGENT request to our medical records department for assistance.    ADDENDUM:  contacted the [facility] department directly to escalate release of medical records.  spoke with Elizabeth who is recommending that I email the request to [email or web address]    Letty Arevalo RN on 1/31/2024 at 11:53 AM

## 2024-01-31 NOTE — TELEPHONE ENCOUNTER
Ok to give verbal orders.    Luma Kauffman MD  Internal Medicine & Pediatrics  [facility]  She/her

## 2024-01-31 NOTE — TELEPHONE ENCOUNTER
Called Mirian back & provided verbal ok.    SHAMIR Jauregui  Patient Advocate Liason (PAL)  [facility]  Ph. [phone] / Fax. [phone]

## 2024-01-31 NOTE — TELEPHONE ENCOUNTER
Pt had an appointment with Dr. Badillo(Oncologist) yesterday.     Routing to MT to review oncology notes & advise on the dizziness and tiredness. Has upcoming appointment with Klaudia on 2/13.     SHAMIR Jauregui  Patient Advocate Liason (PAL)  [facility]  Ph. [phone] / Fax. [phone]

## 2024-02-01 NOTE — PROGRESS NOTES
DISCHARGE  Reason for Discharge: Change in medical status.    Equipment Issued:     Discharge Plan: Other services: Pt to receive services in the home setting.    Referring Provider:  Luma Kauffman

## 2024-02-01 NOTE — TELEPHONE ENCOUNTER
Called spouse(Marcie) at [phone] to get an update, not available, so LM to call me back. Will await for her call back.    SHAMIR Jauregui  Patient Advocate Liason (PAL)  [facility]  Ph. [phone] / Fax. [phone]

## 2024-02-01 NOTE — TELEPHONE ENCOUNTER
Agree with holding losartan (Cozaar).    Also per other message [facility] nurse introduced idea of hospice to patient and wife which seems reasonable given collection of symptoms and prognosis.      Luma Kauffman MD  Internal Medicine & Pediatrics  [facility]  She/her

## 2024-02-02 NOTE — TELEPHONE ENCOUNTER
Sent MC message to spouse with recommendation. Will await for her response.    SHAMIR Jauregui  Patient Advocate Liason (PAL)  [facility]  Ph. [phone] / Fax. [phone]

## 2024-02-02 NOTE — PROGRESS NOTES
Therapy: 5FU, Home disconnects  Insurance: BCBS Medicare Adv    Co-Insurance: 80/20  Max Out of Pocket: $3000  Met: $335    Misc: Pt meets Medicare criteria for 5fu and it must be done via CADD pump for coverage. Pt's plan covers automatically at 80/20 until their $335/$3000 out of pocket has been met. Once satisfied pt would be covered at 100%.     Since pt meets Medicare criteria they have coverage for home disconnects.     In reference to referral from [facility] received on 02/02/24 to check for 5fu and home disconnect coverage.    Please contact Intake with any questions, [phone] or In Basket pool,  Home Infusion ([phone]).

## 2024-02-03 NOTE — PROGRESS NOTES
LATE ENTRY FROM 02/02/24    [facility]: Cancer Care Plan of Care Education Note                                    Discussion with Patient:                                                      Writer met with Benji and his wife Marcie to discuss port and chemotherapy education.     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  Does patient understand diagnosis?: Yes  Tx plan/regimen:: (P) Leucovorin 350 mg/m2 IV IV on day 1  Oxaliplatin 85 mg/m2 IV on day 1--> 25% dose reduction  Fluorouracil 400 mg/m2 IV on day 1--> Omit bolus  Fluorouracil 2400 mg/m2 CIV over 46 hours--> 25% dose reduction  Bevacizumab 5 mg/kg IV on day 1--> add on to cycle 2 or 3 depending on tolerance  Does patient understand treatment plan/regimen?: (P) Yes  Preparing for treatment:: (P) Reviewed treatment preparation information with patient (vascular access, day of chemo, visitor policy, what to bring, etc.)  Vascular access education provided for:: (P) Port  Side effect education:: (P) Diarrhea/Constipation;Fatigue;Hair loss;Infection;Lab value monitoring (anemia, neutropenia, thrombocytopenia);Mouth sores;Mylosuppression;Nausea/Vomiting;Neuropathy;Sexual health;Skin changes  Supportive services education provided for:: (P) Palliative care  Safety/self care at home reviewed with patient:: (P) Yes  Coping - concerns/fears reviewed with patient:: (P) Yes  Plan of Care:: (P) Lab appointment;MD follow-up appointment;Treatment schedule  When to call provider:: (P) Bleeding;Increased shortness of breath;New/worsening pain;Shaking chills;Temperature >100.4F;Uncontrolled diarrhea/constipation;Uncontrolled nausea/vomiting  Reasons for deferring treatment reviewed with patient:: (P) Yes  Procedure education  provided for: : (P) Port/PICC placement    Evaluation of Learning  Patient Education Provided: (P) Yes  Readiness:: (P) Acceptance  Method:: (P) Booklet/Handout;Explanation  Response:: (P) Verbalizes understanding    Intervention/Education provided during outreach:                                                       Port Education:   Discussed what a port is, why it's needed, getting ready for port placement, eating and drinking restrictions, anti-germ scrub, length of procedure, the importance of having a , risks, self care at home, skin care, bathing, activity restrictions, flushing the port, EMLA cream, and when to contact the triage nurses.     - Your Implanted Port handout given to Benji and discussed in detail.  - Medication list reviewed. He is taking an 81mg Aspirin daily. This does not need to be held prior to port placement.   - A bottle of Hibiclens given.  - Roles of RNCC and triage team reviewed. Business card with contact information given.     Chemotherapy Education:     Writer discussed Oxaliplatin, Leucovorin, and Fluorouracil in detail. Discussed what each medication is, how they are given, what they are used for, all possible side effects, precautions, self care tips, when to contact the triage team, when to seek emergency care, lab monitoring, and the possibility of deferring treatment.     - [facility] handouts given and discuss in detail.   - Take home medications Zofran, Compazine, Dexamethasone discussed. Advised to bring with Benji on his first date of treatment.   - Medication list reviewed. He is not taking any OTC herbal supplements.   - Day of chemotherapy tips discussed.    During Teach Benji and Marcie state they are feeling unsure if Benji will be moving forward with this plan of care. They would like a second opinion at the [facility] and would also like a consultation with palliative care. Benji would also like Dr. Badillo to advise on his life expectancy without treatment. Writer  will follow up with Dr. Badillo.    Letty Arevalo RN on 2/3/2024 at 3:53 PM

## 2024-02-05 NOTE — TELEPHONE ENCOUNTER
Prescription signed.    Luma Kauffman MD  Internal Medicine & Pediatrics  [facility]  She/her

## 2024-02-05 NOTE — TELEPHONE ENCOUNTER
Sent MC message to fernando Jauregui RN  Patient Advocate Liason (PAL)  [facility]  Ph. [phone] / Fax. [phone]

## 2024-02-05 NOTE — TELEPHONE ENCOUNTER
See the  message from spouse. Called spouse(Marcie) at [phone] to get details.     Pt & spouse were on the phone. Bilateral eyes are red with itchiness since Thursday. Denies swelling, discharge(has mild watery eyes), bleeding, irritation, vision trouble, sensitive to light, pain, HA, dizziness, fever or other cold sx's. Never used otc artificial tears in the past.      - can he try artificial tears to see whether that helps? If yes, please send a rx to the pended pharmacy. Please advise.     FYI: Pt was to start chemo over the weekend. But I see notes from pt/spouse to his oncology team that they decided not to pursue chemo at this time, planning to get a 2nd opinion from [facility]. Also, considering hospice, haven't decided on hospice yet.      SHAMIR Jauregui  Patient Advocate Liason (PAL)  [facility]  Ph. [phone] / Fax. [phone]

## 2024-02-05 NOTE — PROGRESS NOTES
Writer called Marcie and Benji to discuss mychart message that was sent. After receiving chemotherapy teach, Marcie and Benji are electing not to move forward with treatment. They are almost certain that they would like to move forward with hospice care. Marcie and Benji report feeling understandably overwhelmed and would like to keep follow up appointment with Dr. Badillo on 02/15/24 to finalize plan of care. Writer will route to Dr. Badillo for an update.    Letty Arevalo RN on 2/5/2024 at 11:14 AM

## 2024-02-06 NOTE — TELEPHONE ENCOUNTER
Home Care is calling regarding an established patient with [facility].       Requesting orders from: Luma Kauffman  Provider is following patient: No       Orders Requested    Dietician  Request for initial evaluation and treatment (one time) (first set of orders)   Per patient request. Patient has general questions to help with energy levels and healthy diet.       Information was gathered and will be sent to provider for review.  RN will contact Home Care with information after provider review.  Information was gathered and will be sent to provider to confirm provider will be following patient.  RN will contact Home Care with information after provider review.  Confirmed ok to leave a detailed message with call back.  Contact information confirmed and updated as needed.    Call back #[phone] - ok to [facility].     Jose Ramon Alatorre RN

## 2024-02-06 NOTE — TELEPHONE ENCOUNTER
Ok to give verbal order for dietician evaluation and any subsequent recommendations.    Luma Kauffman MD  Internal Medicine & Pediatrics  [facility]  She/her

## 2024-02-06 NOTE — TELEPHONE ENCOUNTER
See the  message from pt. I see that her out-patient PT & OT appointments has been cancelled already.    SHAMIR Jauregui  Patient Advocate Liason (PAL)  [facility]  Ph. [phone] / Fax. [phone]

## 2024-02-06 NOTE — TELEPHONE ENCOUNTER
Called [employer]  at [phone], not available, so left detailed message in Constance Sanchez's(PT) confidential VM providing verbal order for dietician evaluation. Requested a call back at [phone] with any questions.    SHAMIR Jauregui  Patient Advocate Liason (PAL)  [facility]  Ph. [phone] / Fax. [phone]

## 2024-02-07 NOTE — TELEPHONE ENCOUNTER
Home Care is calling regarding an established patient with [facility].       Requesting orders from: Luma Kauffman  Provider is following patient: No       Orders Requested - hospice orders. Patient is admitting on 2/9/24.     Hospice  Request for  Ok needed from provider for hospice, patient is being admitted into hospice 2/9/24.       Information was gathered and will be sent to provider for review.  RN will contact Home Care with information after provider review.  Information was gathered and will be sent to provider to confirm provider will be following patient.  RN will contact Home Care with information after provider review.  Confirmed ok to leave a detailed message with call back.  Contact information confirmed and updated as needed.    [phone] - ok to [facility].       Jose Ramon Alatorre RN

## 2024-02-07 NOTE — TELEPHONE ENCOUNTER
Kat from [facility] is calling.  States that she is currently at pt's home with pt and his wife.  Pt and his wife are asking about the appt that is scheduled with Dr Badillo on 2/15/2024 and are wondering what the appt is for.    Writer advised pt that it has been scheduled to discuss goals of care.  Per chart review, after reviewing chemo education, pt had decided against any chemotherapy.    Kat states that pt is already scheduled for hospice intake consult on 2/9/2024 through [facility].    Advised Kat and pt that a message will be sent to YOBANY Saenz for follow up.    Alysa Campos RN on 2/7/2024 at 1:51 PM

## 2024-02-07 NOTE — PROGRESS NOTES
Benji is a 83 year old who is being evaluated via a billable telephone visit.      What phone number would you like to be contacted at? cell  How would you like to obtain your AVS? Isidoro    Distant Location (provider location):  Off-site    Assessment & Plan     Type 2 diabetes mellitus with other specified complication, with long-term current use of insulin (H)  Follows with MTM. Over the past 2 mos had a couple episode of fasting sugar in the 60s (asymptomatic with this) and sugars 80s post-meal, again asymptomatic. He overall has had decreased appetite the past couple of months. Afternoon sugars are higher as he can eat more then, often in the mid 200s. He has appointment with MTM next week, will discuss if any changes recommended before that.    Overweight - BMI >35  Other hyperlipidemia   Hypertension goal BP (blood pressure) < 140/90  Stage 3a chronic kidney disease (H)  Comorbid conditions related to DM:  Weight down a bit due to decreased appetite.   On statin  Recent BP readings at goal.  On ARB for renal protection.  Creatinine   Date Value Ref Range Status   01/30/2024 1.47 (H) 0.67 - 1.17 mg/dL Final   06/01/2021 1.51 (H) 0.66 - 1.25 mg/dL Final     BP Readings from Last 3 Encounters:   01/30/24 108/68   01/24/24 124/61   12/27/23 102/54     Wt Readings from Last 4 Encounters:   01/30/24 79.4 kg (175 lb)   01/20/24 79.1 kg (174 lb 6.4 oz)   12/27/23 83.8 kg (184 lb 12.8 oz)   12/11/23 85.7 kg (188 lb 14.4 oz)     Dry eyes  Watery eyes  Now completely resolved. Pt/wife wonder if possibly related to fragrance exposure from cleaning supplies vs fragrance from nurse. Will monitor for now, ok to use OTC artificial tears for now if symptoms return. Notify PCP if this is not helpful.        MED REC REQUIRED  Post Medication Reconciliation Status:  Patient was not discharged from an inpatient facility or TCU  BMI  Estimated body mass index is 25.83 kg/m  as calculated from the following:    Height as of  "1/30/24: 1.753 m (5' 9.02\").    Weight as of 1/30/24: 79.4 kg (175 lb).           Tawanda Leblanc is a 83 year old, presenting for the following health issues:  Diabetes    HPI   Has dexcom  Occasionally will have sugars in the 60s, most often in the mornings  Late afternoon sugars are higher in the 260s  This morning after eating sugar was 80  Felt ok  Breakfast cheerios and bananas  10 units of Lantus morning  Morning more issues with sugars  But still having lows in the mornings    Had a reading in the 60s morning    Lab Results   Component Value Date    A1C 6.9 01/20/2024    A1C 7.1 07/07/2023    A1C 6.4 08/02/2022    A1C 6.1 06/22/2022    A1C 6.2 02/15/2022    A1C 5.9 04/05/2021    A1C 6.7 01/15/2021    A1C 5.6 08/23/2020    A1C 5.5 07/14/2020    A1C 6.3 12/31/2019     #eyes  -cleaning lady visited last week  -eyes started to water and sting  -also nurse with [id number], wondered if that contributed  -eyes now fine          Objective           Vitals:  No vitals were obtained today due to virtual visit.    Physical Exam   General: Alert and no distress //Respiratory: No audible wheeze, cough, or shortness of breath // Psychiatric:  Appropriate affect, tone, and pace of words            Phone call duration: 18 minutes  Signed Electronically by: Cristina Nunez NP    "

## 2024-02-07 NOTE — TELEPHONE ENCOUNTER
Called hospice at [phone] & provided verbal order.    SHAMIR Jauregui  Patient Advocate Liason (PAL)  [facility]  Ph. [phone] / Fax. [phone]

## 2024-02-07 NOTE — TELEPHONE ENCOUNTER
Kat calling with patient and patient's spouse.     Patient has Stage 4 cancer and his appetite has been decreasing. Because of this, his blood sugar have been lower. Needing recommendations for any changes with insulin    For example,   Patient's blood sugar this morning after breakfast was 80. (They were able to bring it up right away after orange juice)     Patient, wife, RN wondering if insulin adjustment is needed to reflect the new appetite habits.     Visit needed? MTM?     Please call patient at spouse on home number- [phone]    Of note:   Benji and wife decided [facility] and have admission meeting this Friday at 0930. Wanted you to be updated.     Routing to PCP and PAL for follow up  Aviva COLE RN on 2/7/2024 at 1:49 PM

## 2024-02-07 NOTE — TELEPHONE ENCOUNTER
Tricia:    Please offer the VV with Enrique this afternoon 2:30 or with Conchita tomorrow morning to discuss low blood glucose. Thanks.    SHAMIR Jauregui  Patient Advocate Crista (PAL)  [facility]  Ph. [phone] / Fax. [phone]

## 2024-02-07 NOTE — Clinical Note
Hi there. This pt is new to me. We had a visit for his concerns of hypoglycemia. He has had a couple episodes of sugars in the 60s in the morning (he thinks fasting but he doesn't remember) and this past week he has been 80 post meal. It sounds like over the past 2 mos he just isn't eating as much. He tells me his afternoon sugars can be in the mid 200s. He takes lantus in the morning. He has been asymptomatic when the sugars are 60s-80s and increases with orange juice. What are your thoughts on his insulin? He is seeing you on 2/13 but wasn't sure if you'd rec any changes before that. Thanks!

## 2024-02-09 NOTE — TELEPHONE ENCOUNTER
Writer called patient's spouse, Marcie, to see if they had the hospice consultation and to see what they have decided?  Also to see if there is anything we can do to help assist them?    Marcie said their hospice consult is later this morning and she said she will follow up with us this afternoon with what they have decided.  Will follow up with Marcie later today.    Arlin Pizano, RN, BSN    RN Care Coordinator  [facility]  [phone]

## 2024-02-09 NOTE — TELEPHONE ENCOUNTER
KETTY Aguero:  Benji is being admitted into hospice today for malignant neoplasm of liver, C22.8.    Ninfa with [facility]  [phone] OK to SURY Hernandez RN on 2/9/2024 at 2:59 PM

## 2024-02-09 NOTE — TELEPHONE ENCOUNTER
Writer spoke with Marcie this afternoon.  She said they did enroll in [facility] hospice, effective today 2/9/24.  She said they had a 2-3 hour consultation this morning with them and she said it went well.  She said it will be a blessing to them to have all the care that they need there at home.  She said hospice will be coming to their house on Monday 2/12 with all the equipment and medications for patient.      Marcie said they don't need anything from us at this time.  She said this is all just really hard.  Writer offered emotional support to patient and told her to feel free to contact our team if they need anything at all from us.  She voiced understanding and said she was very grateful for the care they received here.  Writer will route message to Dr. Edwards, RNCC, to update.    Arlin Pizano, RN, BSN    RN Care Coordinator  [facility]  [phone]

## 2024-02-12 NOTE — TELEPHONE ENCOUNTER
MTM appointment cancelled, patient is in hospice, we made one more attempt to reschedule.     Routing back to referring provider and MTM pharmacist.         Thea Ardon CPhT  MTM

## 2024-02-12 NOTE — TELEPHONE ENCOUNTER
Noted. I was able to see patient's mychart cancellation note. I did reach on mychart just to make sure he was not experiencing hypoglycemia.    Klaudia Santa, PharmD  Medication Therapy Management Pharmacist

## 2024-02-13 NOTE — LETTER
8/18/2021    Vanessa Munson MD  [address] [facility] [address]    RE: Austin Garza       Dear Colleague,    I had the pleasure of seeing Austin Garza in the [facility].    SERVICE DATE: 8/18/2021     Primary Cardiologist: Dr Schaffer    REASON FOR VISIT:  Austin Garza presents for hospital follow up    HISTORY OF PRESENT ILLNESS/ASSESSMENT AND PLAN:  Cardiovascular history includes history of abdominal AAA repair (EVAR on 12/4/2017, underwent percutaneous aneurysm sac puncture and endoleak embolization by IR in May 2019) on chronic Plavix previously, CKD, HTN, HLD, DM II, severe coronary calcifications on chest CT and history of CLL (counts are normal).  Patient was recently admitted with atypical nonanginal sounding chest discomfort and elevated HR.  He was noted to be in atrial fibrillation with RVR.  Echocardiogram showed EF of 45%, mid to basal inferolateral wall and the distal inferior wall appear mildly to moderately hypokinetic in some views (not noted on CHEYANNE).  He was started on rate control strategy and anticoagulation.  He underwent successful CHEYANNE guided DCCV to SR 8/2.  He was started on Eliquis prior to discharge.  Due to the ibrutinib, clopidogrel was discontinued as the bleeding risk can be unacceptably high with a combination of antiplatelet and anticoagulation while on that biologic.  Ibrutinib was put on hold.      Austin has been feeling well, no recurrent CP, no palpitations. Restarted ASA at home because he wasn't sure if he should be on it. Did meet with Oncology 8/6 and was instructed to stay off  ibrutinib for now, they will re-evaluate 9/2 at his next appt.      1. New dx atrial fibrillation with rapid ventricular s/p successful DCCV to SR.  -Appears to be in SR by exam today. No symptomatic recurrence.  -Continue metoprolol 50mg BID  -Continue Eliquis 5mg BID for now since he is less than 30d post DCCV. Per Dr Amaral  "\"May consider either reducing his Eliquis dose in the future or employing rhythm control strategy and discontinuing anticoagulation in the future versus watchman implantation if bleeding issues on the combination of Eliquis and ibrutinib.  If recurrent atrial fibrillation while on the ibrutinib, may consider adding the antiarrhythmic\"    2. New mild cardiomyopathy with EF of ~45% possible tachycardia induced versus ischemic heart disease  -No signs or symptoms of CHF or angina  -Plan for a Funmi nuc to look for ischemia in next 1-2 weeks  -Repeat limited echo in ~2 months    3. CAD/Severe coronary calcifications on chest CT  -Stress test as above  -STOP ASA. No antiplatelet while on AC due to bleeding risk detailed above.  -Continue rosuvastatin    4. Hx of AAA repair    5. History of CLL. Has been on ibrutinib since May 2020.  It was noted this can cause arrhythmias.  Usually, atrial fibrillation alone is not an absolute contraindication to ibrutinib. Did have follow up with his oncologist and ibrutinib remains on hold. Re-evaluate in Sept per their notes    6. Chronic kidney disease, stage III    7. Hypertension/diabetes/hyperlipidemia  -BP has been elevated lately. Due to time constraints we were unable to address today. Will see what it is at the time of his stress test, likely will need additional anti-HTN therapy    Follow up: 2 months with Dr Schaffer with a limited echo prior.  Of course, the patient was encouraged to contact us sooner with questions or concerns.    42 minutes spent on the date of the encounter doing chart review, patient visit and documentation     Maria Isabel Raines PA-C      CURRENT MEDICATIONS:   Current Outpatient Medications   Medication Sig Dispense Refill     ACCU-CHEK GUIDE test strip Use to test blood sugar 2 times daily or as directed. 100 strip 1     apixaban ANTICOAGULANT (ELIQUIS) 5 MG tablet Take 1 tablet (5 mg) by mouth 2 times daily 60 tablet 0     blood glucose monitoring (NO " "BRAND SPECIFIED) meter device kit Use to test blood sugar 2 times daily or as directed. 1 kit 0     CONTOUR NEXT TEST test strip USE TO TEST BLOOD SUGAR 1-2 TIMES DAILY OR AS DIRECTED (ADJUSTING ORAL MEDICATIONS). 100 strip 1     metFORMIN (GLUCOPHAGE-XR) 500 MG 24 hr tablet Take 2 tablets (1,000 mg) by mouth every morning (Patient taking differently: Take 500 mg by mouth 2 times daily (with meals) ) 180 tablet 1     metoprolol tartrate (LOPRESSOR) 50 MG tablet Take 1 tablet (50 mg) by mouth 2 times daily 60 tablet 0     Multiple Vitamins-Minerals (CENTRUM SILVER) per tablet Take 1 tablet by mouth daily       rosuvastatin (CRESTOR) 20 MG tablet Take 1 tablet (20 mg) by mouth At Bedtime 30 tablet 0     desonide (DESOWEN) 0.05 % external ointment Apply topically 2 times daily To forehead x 2 weeks (Patient not taking: Reported on 8/18/2021)       ibrutinib (IMBRUVICA) 420 MG tablet Take 1 tablet (420 mg) by mouth daily Do not take until you hear from Dr. Perry's office. (Patient not taking: Reported on 8/6/2021) 28 tablet 0       ALLERGIES:  Allergies   Allergen Reactions     Ace Inhibitors      cough       ROS:  Skin:  Negative     Eyes:  Negative    ENT:  Positive for hearing loss  Respiratory:  Positive for shortness of breath  Cardiovascular:    Positive for;dizziness;fatigue  Gastroenterology: Positive for heartburn  Genitourinary:  Positive for urinary frequency  Musculoskeletal:  Negative    Neurologic:  Positive for numbness or tingling of feet  Psychiatric:  Positive for sleep disturbances  Heme/Lymph/Imm:  Negative    Endocrine:  Positive for diabetes    PHYSICAL EXAMINATION:    GENERAL:  The patient is a pleasant 81 year old who appears their stated age.  In no apparent distress.  VITAL SIGNS:  BP (!) 152/70 (BP Location: Right arm, Patient Position: Sitting, Cuff Size: Adult Regular)   Pulse 80   Ht 1.753 m (5' 9\")   Wt 97.6 kg (215 lb 1.6 oz)   SpO2 97%   BMI 31.76 kg/m      RESPIRATORY:  Breathing " is nonlabored.  Lungs are clear to auscultation bilaterally.   CARDIAC:  RRR. No murmur  NEUROLOGIC:  Alert and oriented.    DATA REVIEWED:    Component      Latest Ref Rng & Units 8/10/2021   Sodium      133 - 144 mmol/L 136   Potassium      3.4 - 5.3 mmol/L 4.2   Chloride      94 - 109 mmol/L 105   Carbon Dioxide      20 - 32 mmol/L 26   Anion Gap      3 - 14 mmol/L 5   Urea Nitrogen      7 - 30 mg/dL 24   Creatinine      0.66 - 1.25 mg/dL 1.56 (H)   Calcium      8.5 - 10.1 mg/dL 8.9   Glucose      70 - 99 mg/dL 261 (H)   Alkaline Phosphatase      40 - 150 U/L 62   AST      0 - 45 U/L 27   ALT      0 - 70 U/L 49   Protein Total      6.8 - 8.8 g/dL 6.7 (L)   Albumin      3.4 - 5.0 g/dL 3.5   Bilirubin Total      0.2 - 1.3 mg/dL 0.5   GFR Estimate      >60 mL/min/1.73m2 42 (L)     Component      Latest Ref Rng & Units 8/10/2021   WBC      4.0 - 11.0 10e3/uL 6.5   RBC Count      4.40 - 5.90 10e6/uL 3.76 (L)   Hemoglobin      13.3 - 17.7 g/dL 12.3 (L)   Hematocrit      40.0 - 53.0 % 36.5 (L)   MCV      78 - 100 fL 97   MCH      26.5 - 33.0 pg 32.7   MCHC      31.5 - 36.5 g/dL 33.7   RDW      10.0 - 15.0 % 13.7   Platelet Count      150 - 450 10e3/uL 102 (L)     Hospital notes as summarized above.       Thank you for allowing me to participate in the care of your patient.      Sincerely,     Maria Isabel Raines PA-C     [facility]  cc:   Maria Isabel Raines PA-C  [facility]  [address] [place] [address]         no

## 2024-02-27 NOTE — PLAN OF CARE
Needs dm appt    PRIMARY DIAGNOSIS: HYPO/HYPERGLYCEMIA    OUTPATIENT/OBSERVATION GOALS TO BE MET BEFORE DISCHARGE  BG greater than 100 and less than 250 on two consecutive readings: Yes (one BG <100 (85) came up to 103)  Recent Labs   Lab Test 08/24/20  0534 08/24/20  0437 08/24/20  0148   * 85 115*         Ketones absent from urine  (hyperglycemia): N/A    Tolerating oral intake to maintain hydration: Yes    Return to near baseline physical activity: Yes    Discharge Planner Nurse   Safe discharge environment identified: Yes  Barriers to discharge: No       Entered by: Breana Bustamante 08/24/2020 5:47 AM     Please review provider order for any additional goals.   Nurse to notify provider when observation goals have been met and patient is ready for discharge.

## 2024-03-25 NOTE — TELEPHONE ENCOUNTER
See the [facility] message from spouse. Pt is currently in hospice.    Sent a new rx for more sensors.     Continuous Blood Gluc Sensor (DEXCOM G7 SENSOR) MISC 3 each 5 9/19/2023 -- No   Sig - Route: 1 each every 10 days Change sensor every 10 days - Does not apply     Shania RN  Patient Advocate Liason (PAL)  [facility]  Ph. [phone] / Fax. [phone]

## 2024-03-26 NOTE — TELEPHONE ENCOUNTER
Dr.Milligan - sending as a FYI!    Advised spouse(Marcie) to reach out to us with any help or needed resources through a different encounter.    SHAMIR Jauregui  Patient Advocate Liason (PAL)  [facility]  Ph. [phone] / Fax. [phone]         Complex Repair And Transposition Flap Text: The defect edges were debeveled with a #15 scalpel blade.  The primary defect was closed partially with a complex linear closure.  Given the location of the remaining defect, shape of the defect and the proximity to free margins a transposition flap was deemed most appropriate for complete closure of the defect.  Using a sterile surgical marker, an appropriate advancement flap was drawn incorporating the defect and placing the expected incisions within the relaxed skin tension lines where possible. The area thus outlined was incised deep to adipose tissue with a #15 scalpel blade. The skin margins were undermined to an appropriate distance in all directions utilizing iris scissors and carried over to close the primary defect.

## 2024-04-11 NOTE — NURSING NOTE
"Oncology Rooming Note    January 30, 2024 3:09 PM   Austin Garza is a 83 year old male who presents for:    Chief Complaint   Patient presents with    Oncology Clinic Visit     Cancer of transverse colon   Chronic lymphocytic leukemia (CLL), B-cell           Initial Vitals: /68   Pulse 100   Temp 97.5  F (36.4  C) (Oral)   Resp 18   Ht 1.753 m (5' 9.02\")   Wt 79.4 kg (175 lb)   SpO2 99%   BMI 25.83 kg/m   Estimated body mass index is 25.83 kg/m  as calculated from the following:    Height as of this encounter: 1.753 m (5' 9.02\").    Weight as of this encounter: 79.4 kg (175 lb). Body surface area is 1.97 meters squared.  Mild Pain (2) Comment: Data Unavailable   No LMP for male patient.  Allergies reviewed: Yes  Medications reviewed: Yes    Medications: Medication refills not needed today.  Pharmacy name entered into Seaters: [id number] [place] [address]    Frailty Screening:   Is the patient here for a new oncology consult visit in cancer care? 2. No      Clinical concerns: Follow up        Elizabeth Layton MA              " General

## 2024-04-25 NOTE — PROGRESS NOTES
"Austin Garza is a 80 year old male who is being evaluated via a billable video visit.      The patient has been notified of following:     \"This video visit will be conducted via a call between you and your physician/provider. We have found that certain health care needs can be provided without the need for an in-person physical exam.  This service lets us provide the care you need with a video conversation.  If a prescription is necessary we can send it directly to your pharmacy.  If lab work is needed we can place an order for that and you can then stop by our lab to have the test done at a later time.    Video visits are billed at different rates depending on your insurance coverage.  Please reach out to your insurance provider with any questions.    If during the course of the call the physician/provider feels a video visit is not appropriate, you will not be charged for this service.\"    Patient has given verbal consent for Video visit? Yes  How would you like to obtain your AVS? [phone]  If you are dropped from the video visit, the video invite should be resent to: My Chart   Will anyone else be joining your video visit? No      Video-Visit Details    Type of service:  Video Visit    Video Start Time: 9:25 am  Video End Time: 9:32 AM    Originating Location (pt. Location): Home    Distant Location (provider location):  [facility]     Platform used for Video Visit: StartX, and then switched to [facility]    Hematology/Oncology Established Patient Note      Today's Date: 8/13/20    Reason for Follow-up: CLL      HISTORY OF PRESENT ILLNESS: Austin Garza is a 80 year old male with PMHx of DMII, HTN who presented with leukocytosis.  In November 2017, he was found to have elevated WBC of 61.7K, hemoglobin of 10.4, and platelet of 115K.  There were no other CBC results available between 2010 and 2017.  Prior to 2010, he had normal CBC, with normal to " mild anemia, and mild thrombocytopenia.   He was asymptomatic.    He underwent bone marrow biopsy on 11/21/17, which was consistent with chronic lymphocytic leukemia/small lymphocytic lymphoma, with 13% ringed sideroblasts identified.  The presence of increased numbers of ringed sideroblasts raises the possibility of an associated myelodysplastic syndrome.  Dysplastic changes are not identified though.  Many cases exhibiting ringed sideroblasts are metabolic origin, without significant risk of progression to acute leukemia, and these typically do not show dysplastic morphology as is the case with this specimen.  15% ringed sideroblasts are required for a diagnosis for RARS, which this specimen does not meet.  Flow cytometry is also consistent with CLL/SLL.  Cytogenetics show two (10%) of the metaphase cells comprise a clone characterized by a deletion within the proximal long arm of a chromosome 13 as the sole karyotypic abnormality.  Three (15%) metaphases had loss of the Y chromosome as the sole abnormality.    CT scan on 11/29/17 shows mildly enlarged left axillary lymph node and periaortic lymph nodes in the retroperitoneum of the abdomen.  Spleen is also enlarged.    On his staging CT scan for CLL, he was incidentally found to have a large infrarenal abdominal aortic aneurysm, measuring 7.6 cm.  He was seen by Dr. Thomas, and he underwent EVAR on 12/4/17.     He started ibrutinib on 5/4/20.  It was held 5/28/20-6/4/20 for tooth extraction.      INTERIM HISTORY: Austin says that he feels well.  He continues to take ibrutinib and denies problems with it.        REVIEW OF SYSTEMS:   14 point ROS was reviewed and is negative other than as noted above in HPI.       HOME MEDICATIONS:  Current Outpatient Medications   Medication Sig Dispense Refill     aspirin (ASA) 81 MG tablet Take 1 tablet (81 mg) by mouth every evening (Patient taking differently: Take 325 mg by mouth every evening ) 90 tablet 3     atorvastatin  (LIPITOR) 20 MG tablet Take 1 tablet (20 mg) by mouth At Bedtime 90 tablet 3     blood glucose (NO BRAND SPECIFIED) lancets standard Use to test blood sugar 1-2 times daily or as directed.  Dispense brand covered by insurance/Contour next per pt 100 each 1     blood glucose (NO BRAND SPECIFIED) test strip Use to test blood sugar 1-2 times daily or as directed (adjusting oral medications). 100 each 1     blood glucose monitoring (NO BRAND SPECIFIED) meter device kit Use to test blood sugar 1-2 times daily or as directed. Dispense brand as coved by insurance/Contour next per pt 1 kit 0     clopidogrel (PLAVIX) 75 MG tablet TAKE 1 TABLET BY MOUTH DAILY 90 tablet 1     glimepiride (AMARYL) 4 MG tablet Take 1 tablet (4 mg) by mouth every morning (before breakfast) 90 tablet 1     ibrutinib (IMBRUVICA) 420 MG tablet Take 1 tablet (420 mg) by mouth daily 28 tablet 0     losartan-hydrochlorothiazide (HYZAAR) 100-25 MG tablet Take 1 tablet by mouth daily 90 tablet 3     Multiple Vitamins-Minerals (CENTRUM SILVER) per tablet Take 1 tablet by mouth daily       ibrutinib (IMBRUVICA) 420 MG tablet Take 1 tablet (420 mg) by mouth daily 28 tablet 0     ibrutinib (IMBRUVICA) 420 MG tablet Take 1 tablet (420 mg) by mouth daily 28 tablet 0     ibrutinib (IMBRUVICA) 420 MG tablet Take 1 tablet (420 mg) by mouth daily 28 tablet 0     ibrutinib (IMBRUVICA) 420 MG tablet Take 1 tablet (420 mg) by mouth daily 28 tablet 0         ALLERGIES:  Allergies   Allergen Reactions     Ace Inhibitors      cough         PAST MEDICAL HISTORY:  Past Medical History:   Diagnosis Date     AAA (abdominal aortic aneurysm)      BCC (basal cell carcinoma of skin) - face      CLL (chronic lymphocytic leukemia)      Diaphragmatic hernia      Diverticulitis of colon      Esophageal reflux      Essential hypertension      Hyperlipidemia      Irritable bowel syndrome      Macular degeneration (senile) of retina      Squamous Cell Carcinoma, Back 1988     Type 2  diabetes mellitus          PAST SURGICAL HISTORY:  Past Surgical History:   Procedure Laterality Date     APPENDECTOMY OPEN       BONE MARROW BIOPSY, BONE SPECIMEN, NEEDLE/TROCAR N/A 2017    Procedure: BIOPSY BONE MARROW;  BONE MARROW BIOPSY;  Surgeon: Shady Garcia MD;  Location:  GI     COLONOSCOPY       ENDOVASCULAR REPAIR ANEURYSM ABDOMINAL AORTA N/A 2017    Procedure: ENDOVASCULAR REPAIR ANEURYSM ABDOMINAL AORTA;  ENDOVASCULAR REPAIR ANEURYSM ABDOMINAL AORTA;  Surgeon: Milton Cazares MD;  Location:  OR     Fistulotomy with marsupialization for repair of fisture in ano       IR ABDOMINAL AORTOGRAM  3/11/2019     IR VISCERAL EMBOLIZATION  2019     Multiple skin tags - resection  1998     Squamous cell skin cancer resection - back           SOCIAL HISTORY:  Social History     Socioeconomic History     Marital status:      Spouse name: librado     Number of children: 0     Years of education: 17     Highest education level: Not on file   Occupational History     Occupation: retired   Social Needs     Financial resource strain: Not on file     Food insecurity     Worry: Not on file     Inability: Not on file     Transportation needs     Medical: Not on file     Non-medical: Not on file   Tobacco Use     Smoking status: Former Smoker     Packs/day: 0.50     Years: 20.00     Pack years: 10.00     Last attempt to quit: 1975     Years since quittin.6     Smokeless tobacco: Former User   Substance and Sexual Activity     Alcohol use: Yes     Alcohol/week: 10.0 - 14.0 standard drinks     Types: 10 - 14 Standard drinks or equivalent per week     Comment: 2 drinks a day/wine     Drug use: No     Sexual activity: Never   Lifestyle     Physical activity     Days per week: Not on file     Minutes per session: Not on file     Stress: Not on file   Relationships     Social connections     Talks on phone: Not on file     Gets together: Not on file     Attends  Voodoo service: Not on file     Active member of club or organization: Not on file     Attends meetings of clubs or organizations: Not on file     Relationship status: Not on file     Intimate partner violence     Fear of current or ex partner: Not on file     Emotionally abused: Not on file     Physically abused: Not on file     Forced sexual activity: Not on file   Other Topics Concern     Parent/sibling w/ CABG, MI or angioplasty before 65F 55M? Not Asked   Social History Narrative     Not on file   He quit smoking in .  He drinks 1-3 glasses of wine a night with dinner.  He is retired, and used to work as a .  He lives with his wife in [place].  His cousin had lung cancer and  around age 69.  Otherwise, he denies family history of cancer.        FAMILY HISTORY:  Family History   Problem Relation Age of Onset     Cerebrovascular Disease Father         x 2     Hypertension Mother      C.A.D. Mother      Cancer Mother         skin     Eye Disorder Mother         glaucoma     Prostate Cancer No family hx of      Cancer - colorectal No family hx of      Glaucoma No family hx of      Macular Degeneration No family hx of          PHYSICAL EXAM:  Vital signs:  There were no vitals taken for this visit.   ECO  GENERAL/CONSTITUTIONAL: No acute distress. Healthy, alert.  EYES: No scleral icterus.  No redness or discharge.    RESPIRATORY: No audible wheeze, cough, or visible cyanosis.  No visible retractions or increased work of breathing.  Able to speak fully in complete sentences.  MUSCULOSKELETAL: Normal range of motion.  NEUROLOGIC: Alert, oriented, answers questions appropriately. No tremor. Mentation intact and speech normal  INTEGUMENTARY: No jaundice.  No obvious rash or skin lesions.  PSYCHIATRIC:  Mentation appears normal, affect normal/bright, judgement and insight intact, normal speech and appearance well-groomed.    The rest of a comprehensive physical exam is deferred due to public health  emergency video visit restrictions.        LABS:  CBC RESULTS:   Recent Labs   Lab Test 08/11/20  1000   .4*   RBC 2.47*   HGB 8.1*   HCT 27.8*   *   MCH 32.8   MCHC 29.1*   RDW 18.6*   *           ASSESSMENT/PLAN:  Austin Garza is a 80 year old male with:    1) CLL/SLL: BLACKWOOD stage III, with lymphocytosis, lymphadenopathy, splenomegaly, and anemia.  He has mild thrombocytopenia as well, but is above 100K.  He presented with leukocytosis with WBC of 61.7K, hemoglobin of 10.4, and platelet count of 115K on diagnosis.  Bone marrow biopsy and flow cytometry confirmed CLL/SLL.  CT scan shows mildly enlarged left axillary lymph node and periaortic lymph nodes in the retroperitoneum of the abdomen, with enlarged spleen.      Due to worsening lymphocyte count, anemia, thrombocytopenia, as well as fatigue and night sweats, he started ibrutinib on 5/4/20.      His WBC remains elevated, but has trended down some.  Hemoglobin and platelet count are stable.          -continue ibrutinib.  We discussed potential side effects, including bleeding risk.  He is not on warfarin and has no history of arrhythmias.  He is on aspirin and Plavix though.  I discussed with pharmacy.  There is no contraindication, but we will monitor closely for bleeding.    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 He completed radiation at [facility] with planned 60 Gy x 30 fractions. He has completed radiation and noted that he did not need any more follow-up for this.    3) Abdominal aortic aneurysm: measures up to 7.6 cm on CT scan, incidentally found.  He underwent EVAR on 12/4/17.  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  He is on Plavix now.  On 5/13/19, he underwent and percutaneous aneurysm sac puncture and endo  leak embolization by IR on 5/13/19.  -follow-up with vascular surgery and IR    4) Diabetes, hypertension:  -following with PCP      Breana Perry MD  Hematology/Oncology  [facility]                   [FreeTextEntry2] : follow up / Chris knee

## 2024-04-28 NOTE — TELEPHONE ENCOUNTER
Dr Brittany Kauffman @[facility]   Hospice patient  @ 4:15pm today. Message will be forwarded to provider to notify her of this.     Rosalind Hannah RN Triage Nurse Advisor 10:38 PM 2024   Reason for Disposition   Notification of death    Additional Information   Negative: Lab calling with strep throat test results and triager can call in prescription   Negative: Lab calling with urinalysis test results and triager can call in prescription   Negative: Medication questions   Negative: Medication renewal and refill questions   Negative: Pre-operative or pre-procedural questions   Negative: ED call to PCP (i.e., primary care provider; doctor, NP, or PA)   Negative: Call about patient who is currently hospitalized   Negative: Doctor (or NP/PA) call to PCP   Negative: [1] Follow-up call from patient regarding patient's clinical status AND [2] information urgent   Negative: Lab or radiology calling with CRITICAL test results   Negative: [1] Caller requests to speak ONLY to PCP AND [2] URGENT question   Negative: [1] Caller requests to speak to PCP now AND [2] won't tell us reason for call  (Exception: If 10 pm to 6 am, caller must first discuss reason for the call.)   Negative: Notification of hospital admission    Protocols used: [facility]

## 2024-04-29 ENCOUNTER — MYC MEDICAL ADVICE (OUTPATIENT)
Dept: PEDIATRICS | Facility: CLINIC | Age: 84
End: 2024-04-29
Payer: COMMERCIAL

## 2024-04-30 ENCOUNTER — PATIENT OUTREACH (OUTPATIENT)
Dept: CARE COORDINATION | Facility: CLINIC | Age: 84
End: 2024-04-30
Payer: COMMERCIAL

## 2024-04-30 NOTE — TELEPHONE ENCOUNTER
Noted.    Yordy BRUMFIELD  Clinic RN  [facility]

## 2024-05-06 ENCOUNTER — TELEPHONE (OUTPATIENT)
Dept: PEDIATRICS | Facility: CLINIC | Age: 84
End: 2024-05-06
Payer: COMMERCIAL

## 2024-05-06 ENCOUNTER — PATIENT OUTREACH (OUTPATIENT)
Dept: ONCOLOGY | Facility: CLINIC | Age: 84
End: 2024-05-06
Payer: COMMERCIAL

## 2024-05-06 NOTE — TELEPHONE ENCOUNTER
"Called [employer].    Manoj was busy and left message with staff.    Left RN Shania VOSS's name and number for callback, if needed.      - Cameron \"Dirk\" Jenaro (he/him/his), RN - Patient Advocate Liason (PAL)  [facility]    "

## 2024-05-06 NOTE — TELEPHONE ENCOUNTER
"Dr.Milligan - call received from Manoj from [employer] requesting you to fill Benji's death certificate forms with the \"cause of death\". With any question, Manoj can be reached at [phone]. Thanks.    Yordy Serrato RN  [facility]          "

## 2024-05-06 NOTE — TELEPHONE ENCOUNTER
"Date of death State file [place]  [id number]      Verified on [email or web address]    \"Notification of \" form completed and emailed to \"[email or web address]\" [email or web address]>  on May 6, 2024  "

## 2024-05-09 DIAGNOSIS — E11.69 TYPE 2 DIABETES MELLITUS WITH OTHER SPECIFIED COMPLICATION, WITH LONG-TERM CURRENT USE OF INSULIN (H): ICD-10-CM

## 2024-05-09 DIAGNOSIS — Z79.4 TYPE 2 DIABETES MELLITUS WITH OTHER SPECIFIED COMPLICATION, WITH LONG-TERM CURRENT USE OF INSULIN (H): ICD-10-CM

## 2024-05-09 RX ORDER — INSULIN GLARGINE 100 [IU]/ML
INJECTION, SOLUTION SUBCUTANEOUS
OUTPATIENT
Start: 2024-05-09

## 2025-04-21 NOTE — TELEPHONE ENCOUNTER
Patient was evaluated by cardiology while inpatient for atypical chest pain and found to be in afib with RVR s/p (DCCV and now in SR). Called patient's wife to discuss any post hospital d/c questions she may have, review medication changes, and confirm f/u appts. Patient's wife denied that patient had any questions regarding new medications or changes to PTA medications. Patient's wife denied that patient had any SOB, chest pain, or light headedness. RN confirmed with patient's wife that patient has an apt scheduled on 8/18/21 with OLEG Maria Isabel. Patient's wife advised to call clinic with any cardiac related questions or concerns prior to this oleg't. Patient's wife verbalized understanding and agreed with plan.               8/2/21 cardiology progress note     81-year-old gentleman with a history of abdominal AAA repair (EVAR on 12/4/2017, underwent percutaneous aneurysm sac puncture and endoleak embolization by IR in May 2019) on chronic Plavix, CKD, HTN, HLD, DM II and history of CLL (counts are normal).  Patient was admitted with atypical nonanginal sounding chest discomfort and elevated HR.  He was noted to be in atrial fibrillation with RVR.  Echocardiogram showed EF of 45%.  Patient was started on rate control strategy and anticoagulation.  He underwent CHEYANNE guided DCCV this AM.      1. New atrial fibrillation with rapid ventricular response s/p DCCV to SR  2. Abdominal aortic aneurysm repair  3. New mild cardiomyopathy with EF of ~45% possible tachycardia induced versus ischemic heart disease  4. Severe coronary calcifications on chest CT  5.   History of CLL. Has been on ibrutinib since May 2020.  It was noted this can cause arrhythmias. He will hold ibrutinib on discharge and follow-up with his oncologist. Usually, atrial fibrillation alone is not an absolute contraindication to ibrutinib.  5. Chronic kidney disease  6. Hypertension/diabetes/hyperlipidemia     Plan and recommendations  1. Continue Eliquis 5mg  BID and metoprolol 100mg BID  2. The patient's chest pain has resolved.  His history is very consistent and says that this was pleuritic and positional chest pain.  He does not have clinical angina.  3. Recommend repeating echocardiogram as an outpatient in a couple of weeks after restoration of normal rhythm.  4. Recommend outpatient nuclear stress test given chest CT findings show severe coronary calcifications and his atypical chest pain and possible wall motion of the LAD reported on echo.  If this is abnormal, then after anticoagulation can be safely stopped following cardioversion, invasive coronary work-up can be considered. Crestor increased this stay, follow up lab outpt.  5. Will stop Plavix for now since he has been started on Eliquis and does not appear to have any active ischemic symptoms/problems. Can readdress if he needs coronary PCI down the line.      Follow up 1-2 weeks in the cardiology clinic with an EKG at that time. Stress testing and repeat echo can be arranged at that time. (Follow up arranged with me on 8/18 @10:30 AM at our [place] location)     Maria Isabel Raines PA-C   21-Apr-2025

## (undated) DEVICE — PACK AAA SBA15AAFS3

## (undated) DEVICE — CATH ANGIO INFINITI 3DRC 4FRX100CM 538476

## (undated) DEVICE — SYR 03ML LL W/O NDL 309657

## (undated) DEVICE — Device

## (undated) DEVICE — ESU GROUND PAD ADULT W/CORD E7507

## (undated) DEVICE — LINEN HALF SHEET 5512

## (undated) DEVICE — DRSG STERI STRIP 1/2X4" R1547

## (undated) DEVICE — SPONGE LAP 18X18" X8435

## (undated) DEVICE — DEFIB PRO-PADZ LVP LQD GEL ADULT 8900-2105-01

## (undated) DEVICE — SU PDS II 0 CTX 60" Z990G

## (undated) DEVICE — CATH PIGTAIL W/MARKERS 5FRX65CM 7602-20M65SH

## (undated) DEVICE — GOWN XLG DISP 9545

## (undated) DEVICE — SU MONOCRYL 4-0 PS-2 27" UND Y426H

## (undated) DEVICE — ENDO FORCEP ENDOJAW BIOPSY 2.8MMX230CM FB-220U

## (undated) DEVICE — SOL NACL 0.9% IRRIG 1000ML BOTTLE 2F7124

## (undated) DEVICE — SU SILK 2-0 TIE 24" SA75H

## (undated) DEVICE — KIT PATIENT POSITIONING PIGAZZI LATEX FREE 40580

## (undated) DEVICE — RAD RX VISIPAQUE 320 (50ML) CHARGE PER ML

## (undated) DEVICE — LINEN FULL SHEET 5511

## (undated) DEVICE — SU SILK 3-0 TIE 24" SA74H

## (undated) DEVICE — GUIDEWIRE VASC 0.035INX150CM INQWIRE J TIP IQ35F150J3F/A

## (undated) DEVICE — COVER EQUIP LEAD SHIELD 30X30" 5020S

## (undated) DEVICE — SYR CONTRAST DELIVERY ANGIOGRAPHY

## (undated) DEVICE — SOL NACL 0.9% IRRIG 1000ML BOTTLE 07138-09

## (undated) DEVICE — SUCTION CANISTER MEDIVAC LINER 3000ML W/LID 65651-530

## (undated) DEVICE — GLOVE PROTEXIS W/NEU-THERA 7.5  2D73TE75

## (undated) DEVICE — PROTECTOR ARM ONE-STEP TRENDELENBURG 40418

## (undated) DEVICE — LINEN TOWEL PACK X5 5464

## (undated) DEVICE — ESU PENCIL W/HOLSTER E2350H

## (undated) DEVICE — DRSG TELFA ISLAND 4X8" 7541

## (undated) DEVICE — SU MONOCRYL 4-0 PS-2 18" UND Y496G

## (undated) DEVICE — SUCTION TIP POOLE K770

## (undated) DEVICE — SPONGE RAY-TEC 4X8" 7318

## (undated) DEVICE — DEVICE FEMOSTOP COMPRESSION 11165

## (undated) DEVICE — CATH TRAY FOLEY COUDE 16FR SILVER W/URINE METER 350ML 304716

## (undated) DEVICE — INTRO SHEATH 4FRX10CM PINNACLE RSS402

## (undated) DEVICE — GOWN IMPERVIOUS SPECIALTY XL/XLONG 39049

## (undated) DEVICE — WIRE GLIDE 0.035"X150CM VASC GR3506

## (undated) DEVICE — ENDO TROCAR BLUNT TIP KII BALLOON 12X100MM C0R47

## (undated) DEVICE — ESU GROUND PAD UNIVERSAL W/O CORD

## (undated) DEVICE — ENDO SHEATH INTRO PROSTH 12FRX30CM

## (undated) DEVICE — SU PDS II 0 CT-1 36" Z346H

## (undated) DEVICE — COVER ULTRASOUND PROBE 6X48" PC1290

## (undated) DEVICE — SOL NACL 0.9% INJ 1000ML BAG 2B1324X

## (undated) DEVICE — PEN MARKING SKIN

## (undated) DEVICE — NDL 19GA 1.5"

## (undated) DEVICE — ESU ELEC BLADE 6" COATED/INSULATED E1455-6

## (undated) DEVICE — DRAPE IOBAN INCISE 36X23" 6651EZ

## (undated) DEVICE — ENDO TROCAR SLEEVE KII Z-THREADED 05X100MM CTS02

## (undated) DEVICE — NDL INSUFFLATION 13GA 120MM C2201

## (undated) DEVICE — NDL PERC ENTRY THINWALL 18GA 7.0" G00166

## (undated) DEVICE — CATH DIAG 4FR JL 4.5 538417

## (undated) DEVICE — SU VICRYL 3-0 SH 27" UND J416H

## (undated) DEVICE — SYR ENDO MARKER SPOT GI 5ML GIS-45

## (undated) DEVICE — PACK SPECIAL PROCEDURE CUSTOM

## (undated) DEVICE — ESU CORD MONOPOLAR 10'  E0510

## (undated) DEVICE — BAG CLEAR TRASH 1.3M 39X33" P4040C

## (undated) DEVICE — ESU LIGASURE IMPACT OPEN SEALER/DVDR CVD LG JAW LF4418

## (undated) DEVICE — CATH ANGIO KUMPE SOFT-VU 5FRX65CM CVD H787107327015

## (undated) DEVICE — GLOVE BIOGEL PI MICRO INDICATOR UNDERGLOVE SZ 6.5 48965

## (undated) DEVICE — SU VICRYL 2-0 TIE 12X18" J905T

## (undated) DEVICE — CATH BALLOON RELIANT STENT GRAFT 8FR REL46

## (undated) DEVICE — NDL SCLEROTHERAPY 25GA CARR-LOCK  00711811

## (undated) DEVICE — SU PROLENE 6-0 C-1DA 30" 8706H

## (undated) DEVICE — SU PROLENE 5-0 C-1DA 36" 8720H

## (undated) DEVICE — SUCTION MANIFOLD NEPTUNE 2 SYS 4 PORT 0702-020-000

## (undated) DEVICE — SU DERMABOND ADVANCED .7ML DNX12

## (undated) DEVICE — KIT HAND CONTROL ANGIOTOUCH ACIST 65CM AT-P65

## (undated) DEVICE — CLIP ETHICON LIGACLIP SM BLUE LT100

## (undated) DEVICE — BLADE CLIPPER 3M 9670

## (undated) DEVICE — BLADE CLIPPER 4406

## (undated) DEVICE — DEVICE MULTI TORQUE TD01

## (undated) DEVICE — SYSTEM CLEARIFY VISUALIZATION 21-345

## (undated) DEVICE — SU VICRYL 3-0 SH-1 CR 8X18" J772D

## (undated) DEVICE — PREP CHLORAPREP 26ML TINTED ORANGE  260815

## (undated) DEVICE — GLOVE BIOGEL PI MICRO SZ 6.0 48560

## (undated) DEVICE — MANIFOLD KIT ANGIO AUTOMATED 014613

## (undated) DEVICE — CATH TRAY FOLEY COUDE SURESTEP 16FR W/DRN BAG LATEX A304416A

## (undated) DEVICE — DRAPE IOBAN INCISE 23X17" 6650EZ

## (undated) DEVICE — WIRE GUIDE 0.035"X145CM BENTSON TSFB-35-145-BH

## (undated) DEVICE — LINEN POUCH DBL 5427

## (undated) DEVICE — SYR 10ML SLIP TIP W/O NDL

## (undated) DEVICE — SUCTION TIP YANKAUER W/O VENT K86

## (undated) DEVICE — BLADE KNIFE SURG 10 371110

## (undated) DEVICE — SOL WATER IRRIG 1000ML BOTTLE 2F7114

## (undated) DEVICE — TUBING SUCTION MEDI-VAC SOFT 3/16"X20' N520A

## (undated) DEVICE — ENDO TROCAR FIRST ENTRY KII FIOS Z-THRD 05X100MM CTF03

## (undated) DEVICE — SU VICRYL 3-0 CT-1 36" J338H

## (undated) DEVICE — WIRE GUIDE LUNDERQUIST .035X260CM G31453

## (undated) DEVICE — SU VICRYL 2-0 CT-1 27" J339H

## (undated) DEVICE — TUBING SUCTION 12"X1/4" N612

## (undated) DEVICE — DECANTER BAG 2002S

## (undated) DEVICE — SPECIMEN CONTAINER 60MLW/10% FORMALIN 59601

## (undated) DEVICE — SHEATH INTRO 6.5FR 11CM

## (undated) DEVICE — LINEN TOWEL PACK X10 5473

## (undated) RX ORDER — LIDOCAINE HYDROCHLORIDE AND EPINEPHRINE 10; 10 MG/ML; UG/ML
INJECTION, SOLUTION INFILTRATION; PERINEURAL
Status: DISPENSED
Start: 2017-11-21

## (undated) RX ORDER — FENTANYL CITRATE 50 UG/ML
INJECTION, SOLUTION INTRAMUSCULAR; INTRAVENOUS
Status: DISPENSED
Start: 2024-01-01

## (undated) RX ORDER — FENTANYL CITRATE-0.9 % NACL/PF 10 MCG/ML
PLASTIC BAG, INJECTION (ML) INTRAVENOUS
Status: DISPENSED
Start: 2023-01-01

## (undated) RX ORDER — BUPIVACAINE HYDROCHLORIDE 2.5 MG/ML
INJECTION, SOLUTION EPIDURAL; INFILTRATION; INTRACAUDAL
Status: DISPENSED
Start: 2017-12-04

## (undated) RX ORDER — HEPARIN SODIUM 1000 [USP'U]/ML
INJECTION, SOLUTION INTRAVENOUS; SUBCUTANEOUS
Status: DISPENSED
Start: 2017-12-04

## (undated) RX ORDER — BUPIVACAINE HYDROCHLORIDE AND EPINEPHRINE 2.5; 5 MG/ML; UG/ML
INJECTION, SOLUTION EPIDURAL; INFILTRATION; INTRACAUDAL; PERINEURAL
Status: DISPENSED
Start: 2023-01-01

## (undated) RX ORDER — FENTANYL CITRATE 50 UG/ML
INJECTION, SOLUTION INTRAMUSCULAR; INTRAVENOUS
Status: DISPENSED
Start: 2017-12-04

## (undated) RX ORDER — FENTANYL CITRATE 50 UG/ML
INJECTION, SOLUTION INTRAMUSCULAR; INTRAVENOUS
Status: DISPENSED
Start: 2023-01-01

## (undated) RX ORDER — CEFAZOLIN SODIUM/WATER 2 G/20 ML
SYRINGE (ML) INTRAVENOUS
Status: DISPENSED
Start: 2023-01-01

## (undated) RX ORDER — METRONIDAZOLE 500 MG/100ML
INJECTION, SOLUTION INTRAVENOUS
Status: DISPENSED
Start: 2023-01-01

## (undated) RX ORDER — ONDANSETRON 2 MG/ML
INJECTION INTRAMUSCULAR; INTRAVENOUS
Status: DISPENSED
Start: 2023-01-01

## (undated) RX ORDER — FENTANYL CITRATE 50 UG/ML
INJECTION, SOLUTION INTRAMUSCULAR; INTRAVENOUS
Status: DISPENSED
Start: 2017-11-21

## (undated) RX ORDER — LIDOCAINE HYDROCHLORIDE 20 MG/ML
INJECTION, SOLUTION EPIDURAL; INFILTRATION; INTRACAUDAL; PERINEURAL
Status: DISPENSED
Start: 2017-12-04

## (undated) RX ORDER — CEFAZOLIN SODIUM 1 G/3ML
INJECTION, POWDER, FOR SOLUTION INTRAMUSCULAR; INTRAVENOUS
Status: DISPENSED
Start: 2023-01-01

## (undated) RX ORDER — METOPROLOL TARTRATE 1 MG/ML
INJECTION, SOLUTION INTRAVENOUS
Status: DISPENSED
Start: 2023-01-01

## (undated) RX ORDER — PROPOFOL 10 MG/ML
INJECTION, EMULSION INTRAVENOUS
Status: DISPENSED
Start: 2017-12-04

## (undated) RX ORDER — ONDANSETRON 2 MG/ML
INJECTION INTRAMUSCULAR; INTRAVENOUS
Status: DISPENSED
Start: 2017-12-04

## (undated) RX ORDER — HYDRALAZINE HYDROCHLORIDE 20 MG/ML
INJECTION INTRAMUSCULAR; INTRAVENOUS
Status: DISPENSED
Start: 2017-12-04

## (undated) RX ORDER — GLYCOPYRROLATE 0.2 MG/ML
INJECTION, SOLUTION INTRAMUSCULAR; INTRAVENOUS
Status: DISPENSED
Start: 2017-12-04

## (undated) RX ORDER — PROTAMINE SULFATE 10 MG/ML
INJECTION, SOLUTION INTRAVENOUS
Status: DISPENSED
Start: 2017-12-04

## (undated) RX ORDER — HEPARIN SODIUM 5000 [USP'U]/.5ML
INJECTION, SOLUTION INTRAVENOUS; SUBCUTANEOUS
Status: DISPENSED
Start: 2023-01-01

## (undated) RX ORDER — NITROGLYCERIN 5 MG/ML
VIAL (ML) INTRAVENOUS
Status: DISPENSED
Start: 2019-03-11

## (undated) RX ORDER — LABETALOL HYDROCHLORIDE 5 MG/ML
INJECTION, SOLUTION INTRAVENOUS
Status: DISPENSED
Start: 2017-12-04

## (undated) RX ORDER — EPINEPHRINE 1 MG/ML
INJECTION, SOLUTION INTRAMUSCULAR; SUBCUTANEOUS
Status: DISPENSED
Start: 2017-12-04

## (undated) RX ORDER — ACETAMINOPHEN 325 MG/1
TABLET ORAL
Status: DISPENSED
Start: 2023-01-01

## (undated) RX ORDER — DEXAMETHASONE SODIUM PHOSPHATE 4 MG/ML
INJECTION, SOLUTION INTRA-ARTICULAR; INTRALESIONAL; INTRAMUSCULAR; INTRAVENOUS; SOFT TISSUE
Status: DISPENSED
Start: 2017-12-04

## (undated) RX ORDER — VECURONIUM BROMIDE 1 MG/ML
INJECTION, POWDER, LYOPHILIZED, FOR SOLUTION INTRAVENOUS
Status: DISPENSED
Start: 2017-12-04